# Patient Record
Sex: FEMALE | Race: WHITE | HISPANIC OR LATINO | Employment: UNEMPLOYED | URBAN - METROPOLITAN AREA
[De-identification: names, ages, dates, MRNs, and addresses within clinical notes are randomized per-mention and may not be internally consistent; named-entity substitution may affect disease eponyms.]

---

## 2017-05-13 ENCOUNTER — HOSPITAL ENCOUNTER (EMERGENCY)
Facility: HOSPITAL | Age: 52
Discharge: HOME | End: 2017-05-14
Attending: EMERGENCY MEDICINE | Admitting: EMERGENCY MEDICINE
Payer: COMMERCIAL

## 2017-05-13 ENCOUNTER — APPOINTMENT (EMERGENCY)
Dept: ULTRASOUND IMAGING | Facility: HOSPITAL | Age: 52
End: 2017-05-13
Payer: COMMERCIAL

## 2017-05-13 ENCOUNTER — APPOINTMENT (EMERGENCY)
Dept: CT IMAGING | Facility: HOSPITAL | Age: 52
End: 2017-05-13
Payer: COMMERCIAL

## 2017-05-13 VITALS
HEART RATE: 85 BPM | WEIGHT: 226.85 LBS | DIASTOLIC BLOOD PRESSURE: 109 MMHG | RESPIRATION RATE: 18 BRPM | SYSTOLIC BLOOD PRESSURE: 195 MMHG | TEMPERATURE: 97.7 F | OXYGEN SATURATION: 96 %

## 2017-05-13 DIAGNOSIS — R20.2 PARESTHESIA OF LEFT LEG: ICD-10-CM

## 2017-05-13 DIAGNOSIS — M79.605 LEFT LEG PAIN: Primary | ICD-10-CM

## 2017-05-13 DIAGNOSIS — R91.1 NODULE OF RIGHT LUNG: ICD-10-CM

## 2017-05-13 LAB
ANION GAP SERPL CALCULATED.3IONS-SCNC: 8 MMOL/L (ref 4–13)
APTT PPP: 28 SECONDS (ref 23–35)
BASOPHILS # BLD AUTO: 0.06 THOUSANDS/ΜL (ref 0–0.1)
BASOPHILS NFR BLD AUTO: 1 % (ref 0–1)
BUN SERPL-MCNC: 8 MG/DL (ref 5–25)
CALCIUM SERPL-MCNC: 8.6 MG/DL (ref 8.3–10.1)
CHLORIDE SERPL-SCNC: 106 MMOL/L (ref 100–108)
CK SERPL-CCNC: 107 U/L (ref 26–192)
CO2 SERPL-SCNC: 30 MMOL/L (ref 21–32)
CREAT SERPL-MCNC: 0.69 MG/DL (ref 0.6–1.3)
EOSINOPHIL # BLD AUTO: 0.21 THOUSAND/ΜL (ref 0–0.61)
EOSINOPHIL NFR BLD AUTO: 2 % (ref 0–6)
ERYTHROCYTE [DISTWIDTH] IN BLOOD BY AUTOMATED COUNT: 14.2 % (ref 11.6–15.1)
GFR SERPL CREATININE-BSD FRML MDRD: >60 ML/MIN/1.73SQ M
GLUCOSE SERPL-MCNC: 170 MG/DL (ref 65–140)
HCT VFR BLD AUTO: 39.3 % (ref 34.8–46.1)
HGB BLD-MCNC: 12.7 G/DL (ref 11.5–15.4)
INR PPP: 1 (ref 0.86–1.16)
LACTATE SERPL-SCNC: 1.1 MMOL/L (ref 0.5–2)
LYMPHOCYTES # BLD AUTO: 3.81 THOUSANDS/ΜL (ref 0.6–4.47)
LYMPHOCYTES NFR BLD AUTO: 31 % (ref 14–44)
MCH RBC QN AUTO: 27.9 PG (ref 26.8–34.3)
MCHC RBC AUTO-ENTMCNC: 32.3 G/DL (ref 31.4–37.4)
MCV RBC AUTO: 86 FL (ref 82–98)
MONOCYTES # BLD AUTO: 0.98 THOUSAND/ΜL (ref 0.17–1.22)
MONOCYTES NFR BLD AUTO: 8 % (ref 4–12)
NEUTROPHILS # BLD AUTO: 7.37 THOUSANDS/ΜL (ref 1.85–7.62)
NEUTS SEG NFR BLD AUTO: 58 % (ref 43–75)
PLATELET # BLD AUTO: 284 THOUSANDS/UL (ref 149–390)
PMV BLD AUTO: 10.3 FL (ref 8.9–12.7)
POTASSIUM SERPL-SCNC: 3.5 MMOL/L (ref 3.5–5.3)
PROTHROMBIN TIME: 13.5 SECONDS (ref 12.1–14.4)
RBC # BLD AUTO: 4.55 MILLION/UL (ref 3.81–5.12)
SODIUM SERPL-SCNC: 144 MMOL/L (ref 136–145)
WBC # BLD AUTO: 12.43 THOUSAND/UL (ref 4.31–10.16)

## 2017-05-13 PROCEDURE — 85730 THROMBOPLASTIN TIME PARTIAL: CPT | Performed by: EMERGENCY MEDICINE

## 2017-05-13 PROCEDURE — 85025 COMPLETE CBC W/AUTO DIFF WBC: CPT | Performed by: EMERGENCY MEDICINE

## 2017-05-13 PROCEDURE — 85610 PROTHROMBIN TIME: CPT | Performed by: EMERGENCY MEDICINE

## 2017-05-13 PROCEDURE — 75635 CT ANGIO ABDOMINAL ARTERIES: CPT

## 2017-05-13 PROCEDURE — 96361 HYDRATE IV INFUSION ADD-ON: CPT

## 2017-05-13 PROCEDURE — 96374 THER/PROPH/DIAG INJ IV PUSH: CPT

## 2017-05-13 PROCEDURE — 80048 BASIC METABOLIC PNL TOTAL CA: CPT | Performed by: EMERGENCY MEDICINE

## 2017-05-13 PROCEDURE — 36415 COLL VENOUS BLD VENIPUNCTURE: CPT | Performed by: EMERGENCY MEDICINE

## 2017-05-13 PROCEDURE — 83605 ASSAY OF LACTIC ACID: CPT | Performed by: EMERGENCY MEDICINE

## 2017-05-13 PROCEDURE — 96372 THER/PROPH/DIAG INJ SC/IM: CPT

## 2017-05-13 PROCEDURE — 93971 EXTREMITY STUDY: CPT

## 2017-05-13 PROCEDURE — 82550 ASSAY OF CK (CPK): CPT | Performed by: EMERGENCY MEDICINE

## 2017-05-13 RX ORDER — INSULIN GLARGINE 100 [IU]/ML
60 INJECTION, SOLUTION SUBCUTANEOUS
COMMUNITY
End: 2020-06-02 | Stop reason: ALTCHOICE

## 2017-05-13 RX ORDER — LISINOPRIL 10 MG/1
10 TABLET ORAL ONCE
Status: COMPLETED | OUTPATIENT
Start: 2017-05-13 | End: 2017-05-13

## 2017-05-13 RX ORDER — AMLODIPINE BESYLATE 5 MG/1
10 TABLET ORAL ONCE
Status: COMPLETED | OUTPATIENT
Start: 2017-05-13 | End: 2017-05-13

## 2017-05-13 RX ORDER — OXYCODONE HYDROCHLORIDE AND ACETAMINOPHEN 5; 325 MG/1; MG/1
1 TABLET ORAL ONCE
Status: COMPLETED | OUTPATIENT
Start: 2017-05-13 | End: 2017-05-13

## 2017-05-13 RX ORDER — LISINOPRIL 40 MG/1
40 TABLET ORAL DAILY
COMMUNITY
End: 2020-06-29

## 2017-05-13 RX ORDER — ONDANSETRON 2 MG/ML
4 INJECTION INTRAMUSCULAR; INTRAVENOUS ONCE
Status: COMPLETED | OUTPATIENT
Start: 2017-05-14 | End: 2017-05-14

## 2017-05-13 RX ORDER — AMLODIPINE BESYLATE 10 MG/1
10 TABLET ORAL DAILY
COMMUNITY
End: 2021-11-23 | Stop reason: HOSPADM

## 2017-05-13 RX ADMIN — OXYCODONE HYDROCHLORIDE AND ACETAMINOPHEN 1 TABLET: 5; 325 TABLET ORAL at 20:11

## 2017-05-13 RX ADMIN — HYDROMORPHONE HYDROCHLORIDE 1 MG: 1 INJECTION, SOLUTION INTRAMUSCULAR; INTRAVENOUS; SUBCUTANEOUS at 20:33

## 2017-05-13 RX ADMIN — HYDROMORPHONE HYDROCHLORIDE 1 MG: 1 INJECTION, SOLUTION INTRAMUSCULAR; INTRAVENOUS; SUBCUTANEOUS at 21:56

## 2017-05-13 RX ADMIN — LISINOPRIL 10 MG: 10 TABLET ORAL at 20:11

## 2017-05-13 RX ADMIN — GABAPENTIN 400 MG: 100 CAPSULE ORAL at 21:56

## 2017-05-13 RX ADMIN — AMLODIPINE BESYLATE 10 MG: 5 TABLET ORAL at 20:12

## 2017-05-13 RX ADMIN — IOHEXOL 120 ML: 350 INJECTION, SOLUTION INTRAVENOUS at 22:53

## 2017-05-13 RX ADMIN — SODIUM CHLORIDE 1000 ML: 0.9 INJECTION, SOLUTION INTRAVENOUS at 21:45

## 2017-05-14 PROCEDURE — 96375 TX/PRO/DX INJ NEW DRUG ADDON: CPT

## 2017-05-14 PROCEDURE — 99284 EMERGENCY DEPT VISIT MOD MDM: CPT

## 2017-05-14 RX ADMIN — ONDANSETRON 4 MG: 2 INJECTION INTRAMUSCULAR; INTRAVENOUS at 00:06

## 2017-07-26 ENCOUNTER — ALLSCRIPTS OFFICE VISIT (OUTPATIENT)
Dept: OTHER | Facility: OTHER | Age: 52
End: 2017-07-26

## 2017-07-26 DIAGNOSIS — E28.39 OTHER PRIMARY OVARIAN FAILURE: ICD-10-CM

## 2017-07-26 DIAGNOSIS — Z12.31 ENCOUNTER FOR SCREENING MAMMOGRAM FOR MALIGNANT NEOPLASM OF BREAST: ICD-10-CM

## 2017-07-28 LAB
HPV 18 (HISTORICAL): NOT DETECTED
HPV HIGH RISK 16/18 (HISTORICAL): NOT DETECTED
HPV16 (HISTORICAL): NOT DETECTED
PAP (HISTORICAL): NORMAL

## 2017-11-15 ENCOUNTER — ALLSCRIPTS OFFICE VISIT (OUTPATIENT)
Dept: OTHER | Facility: OTHER | Age: 52
End: 2017-11-15

## 2017-11-15 LAB
BACTERIA UR QL AUTO: YES
CLUE CELL (HISTORICAL): NO
TRICHOMONAS (HISTORICAL): NO
YEAST (HISTORICAL): YES

## 2017-11-17 NOTE — PROGRESS NOTES
Assessment    1  Acute vaginitis (616 10) (N76 0)    Plan  Acute vaginitis    · Terconazole 0 4 % Vaginal Cream; INSERT 1 APPLICATORFUL INTRAVAGINALLYAT BEDTIME NIGHTLY   Rx By: Mina Cleaning; Dispense: 0 Days ; #:3 GM; Refill: 0;For: Acute vaginitis; ELLA = N; Verified Transmission to [facility]; Last Updated By: [employer]; 11/15/2017 12:22:20 PM   · (B) CULTURE, GENITAL; Status:Active; Requested for:27Zhb7076;    Perform:BioReference; Due:65Rst8883; Ordered; For:Acute vaginitis; Ordered By:Da Barajas;   · 97 Rumit[address] Thornton; Status:Complete;   Done: 38XQL6503 03:31PM   Performed: In Office; KVE:86NQU3438;ELPCWMB; Today;vaginitis; Ordered By:Da Barajas; Discussion/Summary  Discussion Summary:   R/w pt wet mount c/w candida  Rx sent to pharmacy  Advised probiotic tx additionally as needed based on cx results  Goals and Barriers: The patient has the current Goals: Examine GYN problem  The patent has the current Barriers: None  Patient's Capacity to Self-Care: Patient is able to Self-Care  Medication SE Review and Pt Understands Tx: The treatment plan was reviewed with the patient/guardian  The patient/guardian understands and agrees with the treatment plan   Self Referrals:   Self Referrals: No      Chief Complaint  Chief Complaint Free Text Note Form: Pt for problem visit      History of Present Illness  HPI: Pt for problem visit  Had strep throat few weeks ago began to notice pinkish d/c approx 1 5 weeks ago  Unsure if there is any odor  Was on abx for strep throat  Today began w vaginal itching and irritation  urinary sx  Does complain of vaginal edema  Has not been sexually active in months  Review of Systems  Focused-Female:  Constitutional: No fever, no chills, feels well, no tiredness, no recent weight gain or loss  Cardiovascular: no complaints of slow or fast heart rate, no chest pain, no palpitations, no leg claudication or lower extremity edema    Respiratory: shortness of breath,-- cough,-- wheezing-- and-- shortness of breath during exertion  Breasts: no complaints of breast pain, breast lump or nipple discharge  Gastrointestinal: no complaints of abdominal pain, no constipation, no nausea or diarrhea, no vomiting, no bloody stools  Genitourinary: no complaints of dysuria, no incontinence, no pelvic pain, no dysmenorrhea, no vaginal discharge or abnormal vaginal bleeding  Musculoskeletal: Back Pain, but-- no complaints of arthralgia, no myalgia, no joint swelling or stiffness, no limb pain or swelling  Neurological: no complaints of headache, no confusion, no numbness or tingling, no dizziness or fainting  Other Symptoms: Depression, Anxiety, Easy bruising  Active Problems  1  Anxiety (300 00) (F41 9)   2  Carpal tunnel syndrome (354 0) (G56 00)   3  Depression (311) (F32 9)   4  Diabetes (250 00) (E11 9)   5  Encounter for annual routine gynecological examination (V72 31) (Z01 419)   6  Encounter for screening mammogram for breast cancer (V76 12) (Z12 31)   7  Fibromyalgia (729 1) (M79 7)   8  GERD (gastroesophageal reflux disease) (530 81) (K21 9)   9  History of degenerative disc disease (V13 59) (Z87 39)   10  Hypertension (401 9) (I10)   11  Hypoestrogenism (256 39) (E28 39)   12  Neuropathy (355 9) (G62 9)    Past Medical History  1  History of  7    Surgical History  1  History of Cholecystectomy   2  History of Hysterectomy   3  History of Ovarian Cystectomy   4  History of Tubal Ligation    Family History  Mother    1  Family history of hypertension (V17 49) (Z82 49)  Father    2  Family history of diabetes mellitus (V18 0) (Z83 3)  Sister    3  Family history of renal cell carcinoma (V16 51) (Z80 51)  Aunt    4   Family history of diabetes mellitus (V18 0) (Z83 3)    Social History     · Current every day smoker (305 1) (F17 200)   ·    · Monogamous relationship with monogamous partner   · No alcohol use   · No illicit drug use   · Primary spoken language English    Current Meds   1  AmLODIPine Besylate 10 MG Oral Tablet; Therapy: 32VDS4154 to Recorded   2  Apidra SoloStar 100 UNIT/ML Subcutaneous Solution Pen-injector; Therapy: 34XEA6279 to Recorded   3  Cyclobenzaprine HCl - 5 MG Oral Tablet; Therapy: 51Sif1708 to Recorded   4  Diclofenac Sodium 75 MG Oral Tablet Delayed Release; Therapy: 86YTF2297 to Recorded   5  Doxazosin Mesylate 4 MG Oral Tablet; Therapy: 69NOD8325 to Recorded   6  Gabapentin 600 MG Oral Tablet; Therapy: 50UMV5092 to Recorded   7  HydroCHLOROthiazide 25 MG Oral Tablet; Therapy: 91ODM0759 to Recorded   8  Lantus SoloStar 100 UNIT/ML Subcutaneous Solution Pen-injector; Therapy: 01LUB0345 to Recorded   9  Lisinopril 40 MG Oral Tablet; Therapy: 71DPT6320 to Recorded   10  MetFORMIN HCl - 1000 MG Oral Tablet; Therapy: (Recorded:37Ulo5084) to Recorded   11  Metoprolol Tartrate 100 MG Oral Tablet; Therapy: 33RDS6804 to Recorded   12  OxyCODONE HCl - 5 MG Oral Tablet; Therapy: 39WQC1447 to Recorded   13  Simvastatin 10 MG Oral Tablet; Therapy: 63MGB6380 to Recorded   14  UltiCare Micro Pen Needles 32G X 4 MM Miscellaneous; Therapy: 87WXN8240 to Recorded    Allergies  1  No Known Drug Allergies    Vitals  Vital Signs    Recorded: 90VMC7282 66:93ZK   Systolic 978, LUE, Sitting   Diastolic 90, LUE, Sitting   BP CUFF SIZE Standard   Height 5 ft 10 in   Weight 207 lb    BMI Calculated 29 7   BSA Calculated 2 12       Physical Exam   Constitutional  General appearance: No acute distress, well appearing and well nourished  Genitourinary  External genitalia: Abnormal  -- moderate amount of external erythema  Vagina: Abnormal  -- moderate amount of thick yellow d/c present in vaginal vault  Urethra: Normal    Urethral meatus: Normal    Bladder: Normal, soft, non-tender and no prolapse or masses appreciated  Cervix: Surgically absent  Uterus: Surgically absent  Adnexa/parametria: Normal, non-tender and no fullness or masses appreciated  Skin  Palpation of skin and subcutaneous tissue: Normal    Psychiatric  Orientation to person, place, and time: Normal    Mood and affect: Normal        Results/Data  Naheed Caldwell- POC 77DZW3628 03:31PM Dolores Mcarthur     Test Name Result Flag Reference   BACTERIA yes     CLUE CELL no     TRICHOMONAS no     YEAST yes           Attending Note  Collaborating Physician Note: Collaborating Physician: I discussed the case with the Advanced Practitioner and reviewed the note-- and-- I agree with the Advanced Practitioner note        Signatures   Electronically signed by : Kiarra Stout [facility]; Nov 15 2017 12:36PM EST                       (Author)    Electronically signed by : Kiarra Stout [facility]; Nov 15 2017  3:32PM EST                       (Author)    Electronically signed by : Lorenza Trivedi MD; Nov 16 2017 10:58AM EST                       (Author)

## 2017-11-19 LAB — CULT RSLT GENITAL (HISTORICAL): ABNORMAL

## 2018-01-12 VITALS
HEIGHT: 70 IN | DIASTOLIC BLOOD PRESSURE: 90 MMHG | BODY MASS INDEX: 29.63 KG/M2 | WEIGHT: 207 LBS | SYSTOLIC BLOOD PRESSURE: 158 MMHG

## 2018-01-14 VITALS
BODY MASS INDEX: 29.92 KG/M2 | WEIGHT: 209 LBS | DIASTOLIC BLOOD PRESSURE: 102 MMHG | HEIGHT: 70 IN | SYSTOLIC BLOOD PRESSURE: 184 MMHG

## 2018-04-10 ENCOUNTER — TELEPHONE (OUTPATIENT)
Dept: OBGYN CLINIC | Facility: CLINIC | Age: 53
End: 2018-04-10

## 2018-04-12 ENCOUNTER — OFFICE VISIT (OUTPATIENT)
Dept: OBGYN CLINIC | Facility: CLINIC | Age: 53
End: 2018-04-12
Payer: COMMERCIAL

## 2018-04-12 VITALS
HEIGHT: 71 IN | WEIGHT: 212 LBS | SYSTOLIC BLOOD PRESSURE: 190 MMHG | BODY MASS INDEX: 29.68 KG/M2 | DIASTOLIC BLOOD PRESSURE: 110 MMHG

## 2018-04-12 DIAGNOSIS — B37.3 YEAST VAGINITIS: ICD-10-CM

## 2018-04-12 DIAGNOSIS — N89.8 VAGINAL DISCHARGE: Primary | ICD-10-CM

## 2018-04-12 DIAGNOSIS — N76.0 RECURRENT VAGINITIS: ICD-10-CM

## 2018-04-12 PROBLEM — I10 HYPERTENSION: Status: ACTIVE | Noted: 2017-07-26

## 2018-04-12 PROBLEM — R01.1 CARDIAC MURMUR: Status: ACTIVE | Noted: 2017-12-15

## 2018-04-12 PROBLEM — E28.39 HYPOESTROGENISM: Status: ACTIVE | Noted: 2017-07-26

## 2018-04-12 PROBLEM — M79.7 FIBROMYALGIA: Status: ACTIVE | Noted: 2017-07-26

## 2018-04-12 PROBLEM — F51.01 PRIMARY INSOMNIA: Status: ACTIVE | Noted: 2017-12-15

## 2018-04-12 PROBLEM — F41.9 ANXIETY: Status: ACTIVE | Noted: 2017-07-26

## 2018-04-12 PROBLEM — G62.9 NEUROPATHY: Status: ACTIVE | Noted: 2017-07-26

## 2018-04-12 PROBLEM — E11.9 DIABETES (HCC): Status: ACTIVE | Noted: 2017-07-26

## 2018-04-12 PROBLEM — F32.A DEPRESSION: Status: ACTIVE | Noted: 2017-07-26

## 2018-04-12 PROBLEM — B37.31 YEAST VAGINITIS: Status: ACTIVE | Noted: 2018-04-12

## 2018-04-12 PROBLEM — R19.4 CHANGE IN BOWEL HABIT: Status: ACTIVE | Noted: 2017-04-24

## 2018-04-12 PROBLEM — Z78.9 MEDICALLY COMPLEX PATIENT: Status: ACTIVE | Noted: 2017-06-05

## 2018-04-12 PROBLEM — G89.4 CHRONIC PAIN DISORDER: Status: ACTIVE | Noted: 2017-06-05

## 2018-04-12 PROCEDURE — 99213 OFFICE O/P EST LOW 20 MIN: CPT | Performed by: NURSE PRACTITIONER

## 2018-04-12 RX ORDER — SIMVASTATIN 10 MG
1 TABLET ORAL DAILY
COMMUNITY
Start: 2017-04-05 | End: 2020-06-29

## 2018-04-12 RX ORDER — SERTRALINE HYDROCHLORIDE 25 MG/1
2 TABLET, FILM COATED ORAL
COMMUNITY
Start: 2017-09-22 | End: 2018-08-11

## 2018-04-12 RX ORDER — OSELTAMIVIR PHOSPHATE 75 MG/1
75 CAPSULE ORAL 2 TIMES DAILY
Refills: 0 | COMMUNITY
Start: 2018-01-07 | End: 2018-08-11

## 2018-04-12 RX ORDER — ONDANSETRON 4 MG/1
1 TABLET, FILM COATED ORAL 3 TIMES DAILY PRN
Refills: 0 | COMMUNITY
Start: 2018-01-07 | End: 2019-01-13

## 2018-04-12 RX ORDER — ALPRAZOLAM 0.5 MG/1
0.25 TABLET ORAL 2 TIMES DAILY PRN
COMMUNITY
Start: 2017-09-22 | End: 2020-04-17 | Stop reason: SDUPTHER

## 2018-04-12 RX ORDER — OXYCODONE HYDROCHLORIDE 5 MG/1
1 CAPSULE ORAL EVERY 4 HOURS PRN
Status: ON HOLD | COMMUNITY
Start: 2017-03-14 | End: 2018-08-12

## 2018-04-12 RX ORDER — MELOXICAM 15 MG/1
1 TABLET ORAL DAILY PRN
Refills: 2 | COMMUNITY
Start: 2018-02-22 | End: 2018-08-11

## 2018-04-12 RX ORDER — METOPROLOL TARTRATE 100 MG/1
1 TABLET ORAL DAILY
Status: ON HOLD | COMMUNITY
Start: 2017-04-05 | End: 2018-08-12

## 2018-04-12 RX ORDER — CYCLOBENZAPRINE HCL 5 MG
1 TABLET ORAL 3 TIMES DAILY PRN
COMMUNITY
Start: 2017-04-24 | End: 2018-08-12 | Stop reason: HOSPADM

## 2018-04-12 RX ORDER — GABAPENTIN 600 MG/1
600 TABLET ORAL 3 TIMES DAILY PRN
COMMUNITY
Start: 2017-09-22 | End: 2018-08-11

## 2018-04-12 RX ORDER — FLUCONAZOLE 150 MG/1
TABLET ORAL
Qty: 27 TABLET | Refills: 0 | Status: SHIPPED | OUTPATIENT
Start: 2018-04-12 | End: 2018-08-11

## 2018-04-12 RX ORDER — LANCETS 30 GAUGE
1 EACH MISCELLANEOUS 4 TIMES DAILY
Status: ON HOLD | COMMUNITY

## 2018-04-12 RX ORDER — FLUCONAZOLE 150 MG/1
TABLET ORAL
Qty: 2 TABLET | Refills: 0 | Status: SHIPPED | OUTPATIENT
Start: 2018-04-12 | End: 2018-04-12 | Stop reason: SDUPTHER

## 2018-04-12 RX ORDER — DICLOFENAC SODIUM 75 MG/1
1 TABLET, DELAYED RELEASE ORAL DAILY
COMMUNITY
Start: 2017-06-14 | End: 2018-08-10 | Stop reason: ALTCHOICE

## 2018-04-12 RX ORDER — GABAPENTIN 600 MG/1
1 TABLET ORAL DAILY
COMMUNITY
Start: 2017-06-06 | End: 2018-08-11

## 2018-04-12 NOTE — ASSESSMENT & PLAN NOTE
Reviewed with patient symptoms as well as clinical presentation consistant with yeast vaginitis  Will treat with Diflucan  Reviewed will given Diflucan for recurrent yeast infection  Take 1 pill daily for 3 days then 1 pill weekly for 6 months  Stressed importance on keeping blood sugars under control as will continue to have recurrent yeast infections as long as blood sugars are not controlled  Advised Acidophilous daily  Advised to quit smoking  Advised patient to be seen in ER due to elevated blood pressure in office and continued to be elevated on recheck after resting for 15 minutes in room  Pt states due to her back pain and had not taken her BP meds this morning  Reviewed BP should not be that high from missing one dose of BP meds  Reviewed at high risk for stroke, MI, would really really encourage patient be evaluated in ER

## 2018-04-12 NOTE — PROGRESS NOTES
Assessment/Plan:    Yeast vaginitis  Reviewed with patient symptoms as well as clinical presentation consistant with yeast vaginitis  Will treat with Diflucan  Reviewed will given Diflucan for recurrent yeast infection  Take 1 pill daily for 3 days then 1 pill weekly for 6 months  Stressed importance on keeping blood sugars under control as will continue to have recurrent yeast infections as long as blood sugars are not controlled  Advised Acidophilous daily  Advised to quit smoking  Advised patient to be seen in ER due to elevated blood pressure in office and continued to be elevated on recheck after resting for 15 minutes in room  Pt states due to her back pain and had not taken her BP meds this morning  Reviewed BP should not be that high from missing one dose of BP meds  Reviewed at high risk for stroke, MI, would really really encourage patient be evaluated in ER  Diagnoses and all orders for this visit:    Vaginal discharge  -     Discontinue: fluconazole (DIFLUCAN) 150 mg tablet; Take 1 tablet now skip a day then take 1 tablet on day 3   -     Discontinue: fluconazole (DIFLUCAN) 150 mg tablet; Take 1 tablet now skip a day then take 1 tablet on day 3   -     fluconazole (DIFLUCAN) 150 mg tablet; Take 1 tablet daily for 3 days, then take 1 tablet weekly for 6 months  Yeast vaginitis  -     Discontinue: fluconazole (DIFLUCAN) 150 mg tablet; Take 1 tablet now skip a day then take 1 tablet on day 3   -     Discontinue: fluconazole (DIFLUCAN) 150 mg tablet; Take 1 tablet now skip a day then take 1 tablet on day 3   -     fluconazole (DIFLUCAN) 150 mg tablet; Take 1 tablet daily for 3 days, then take 1 tablet weekly for 6 months  Recurrent vaginitis  -     fluconazole (DIFLUCAN) 150 mg tablet; Take 1 tablet daily for 3 days, then take 1 tablet weekly for 6 months  Other orders  -     cyclobenzaprine (FLEXERIL) 5 mg tablet;  Take 1 tablet by mouth daily  -     diclofenac (VOLTAREN) 75 mg EC tablet; Take 1 tablet by mouth daily  -     gabapentin (NEURONTIN) 600 MG tablet; Take 1 tablet by mouth daily  -     metoprolol tartrate (LOPRESSOR) 100 mg tablet; Take 1 tablet by mouth daily  -     oxyCODONE (OXY-IR) 5 MG capsule; Take 1 tablet by mouth every 4 (four) hours as needed  -     simvastatin (ZOCOR) 10 mg tablet; Take 1 tablet by mouth daily  -     Insulin Pen Needle (ULTICARE MICRO PEN NEEDLES) 32G X 4 MM MISC; 1 Device by Does not apply route 4 (four) times a day  -     ALPRAZolam (XANAX) 0 5 mg tablet; Take 0 5 mg by mouth 2 (two) times a day  -     Glucose Blood (TRUE FOCUS BLOOD GLUCOSE STRIP VI); 1 Device by Does not apply route 4 (four) times a day  -     gabapentin (NEURONTIN) 600 MG tablet; Take 600 mg by mouth 3 (three) times a day as needed  -     Lancets MISC; 1 Device by Does not apply route 4 (four) times a day  -     sertraline (ZOLOFT) 25 mg tablet; Take 2 tablets by mouth daily at bedtime  -     Insulin Pen Needle (UNIFINE PENTIPS PLUS) 31G X 6 MM MISC; 1 Device by Does not apply route 4 (four) times a day  -     meloxicam (MOBIC) 15 mg tablet; Take 1 tablet by mouth daily as needed for pain  -     ondansetron (ZOFRAN) 4 mg tablet; Take 1 tablet by mouth 3 (three) times a day as needed for nausea  -     oseltamivir (TAMIFLU) 75 mg capsule; Take 75 mg by mouth 2 (two) times a day          Subjective:      Patient ID: Nadir Rose is a 47 y o  female  Pt presents today for complaints of recurrent yeast infection  Pt states she was seen in our office sometime in November (11/15/17) and was diagnosed with a yeast infection  Pt was given Terconazole as OTC monistat had not relieved symptoms  After taking Terconazole had relief for 1-2 days then symptoms returned  Pt then received Diflucan 2 pills which she took and symptoms never resolved  Pt states she has continued to have itching, burning, clumpy white vaginal discharge which does not seem to go away   Pt states over the last few months has treated herself with OTC Monistat several times with temporary relief  Pt denies any new medications, soaps, detergents  Reviewed loose cotton clothing, plain dove soap, no scented products used in vaginal area, try to sleep without underwear if possible  Pt is an uncontrolled diabetic who states she has good days and bad days when it comes to her sugars  Pt proceed to state she is also under a lot of stress with her chronic medical conditions  Reports is having difficulty with a lot of back pain, trying to control her blood sugars and blood pressures, as well as acute bronchitis  She said she is trying her best but with all of her chronic conditions becomes difficult to manage them all  Denies any bowel or bladder problems  Last annual exam 7/26/17 Pap normal HPV negative  Period Duration (Days): Historectomy 2001                            The following portions of the patient's history were reviewed and updated as appropriate: allergies, current medications, past family history, past medical history, past social history, past surgical history and problem list   No Known Allergies  Current Outpatient Prescriptions on File Prior to Visit   Medication Sig Dispense Refill    amLODIPine (NORVASC) 10 mg tablet Take 10 mg by mouth daily      Doxazosin Mesylate (CARDURA PO) Take by mouth      HYDROCHLOROTHIAZIDE PO Take by mouth      insulin glargine (LANTUS) 100 units/mL subcutaneous injection Inject 60 Units under the skin daily at bedtime      Insulin Glulisine (APIDRA SOLOSTAR SC) Inject 20 Units under the skin 3 (three) times a day      lisinopril (ZESTRIL) 40 mg tablet Take 40 mg by mouth daily      metFORMIN (GLUCOPHAGE) 1000 MG tablet Take 1,000 mg by mouth 2 (two) times a day with meals      METOPROLOL SUCCINATE ER PO Take by mouth       No current facility-administered medications on file prior to visit        Family History   Problem Relation Age of Onset    Hypertension Mother    [facility] Diabetes Father     Other Sister      renal cell carcinoma    Diabetes Other      Past Medical History:   Diagnosis Date    Diabetes mellitus ([facility] [address] [facility]     Hyperlipidemia     Hypertension      Past Surgical History:   Procedure Laterality Date    CHOLECYSTECTOMY      HYSTERECTOMY      OVARIAN CYST REMOVAL      TUBAL LIGATION       Patient Active Problem List   Diagnosis    Anxiety    Cardiac murmur    Carpal tunnel syndrome of left wrist    Change in bowel habit    Chronic pain disorder    Cough    Depression    Diabetes [facility] [address] [facility]    [facility]    Dizziness    Essential hypertension    Fibromyalgia    Gastroesophageal reflux disease with esophagitis    Hemangioma of bone    Hyperlipidemia associated with type 2 diabetes mellitus (HCC)    Hypertension    Hypoestrogenism    Large adrenal gland (HCC)    Low back pain    Lumbar radiculopathy, chronic    Medically complex patient    Neuropathy    Noncompliance    Noncompliance with diet and medication regimen    Overweight    Pancreatitis    Primary insomnia    Right-sided thoracic back pain    Sciatica of left side    Screening for colon cancer    Shingles    Thoracic radiculitis    Tobacco abuse    Uncontrolled type 2 diabetes mellitus with complication, with long-term current use of insulin (HCC)    Vertebral body hemangioma    Vertigo    Vaginal discharge    Yeast vaginitis    Recurrent vaginitis         Review of Systems   Respiratory: Positive for cough  Genitourinary: Positive for vaginal discharge  Musculoskeletal: Positive for arthralgias and back pain  Psychiatric/Behavioral:        Depression, anxiety     All other systems reviewed and are negative  Objective:      BP (!) 190/110   Ht 5' 10 5" (1 791 m)   Wt 96 2 kg (212 lb)   BMI 29 99 kg/m²      Physical Exam   Constitutional: She is oriented to person, place, and time  She appears well-developed and well-nourished     HENT: Head: Normocephalic  Neck: Normal range of motion  Neck supple  No tracheal deviation present  No thyromegaly present  Cardiovascular: Normal rate, regular rhythm and normal heart sounds  Pulmonary/Chest: Effort normal  She has wheezes  Abdominal: Soft  Bowel sounds are normal  She exhibits no distension and no mass  There is no tenderness  There is no rebound and no guarding  Genitourinary: No labial fusion  There is no rash, tenderness, lesion or injury on the right labia  There is no rash, tenderness, lesion or injury on the left labia  Vaginal discharge (thick white dischrge, consistant with yeast) found  Genitourinary Comments: Cervix and uterus surgically absent   Musculoskeletal: Normal range of motion  Neurological: She is alert and oriented to person, place, and time  Skin: Skin is warm and dry  Psychiatric: She has a normal mood and affect   Her behavior is normal  Judgment and thought content normal

## 2018-08-10 ENCOUNTER — HOSPITAL ENCOUNTER (EMERGENCY)
Facility: HOSPITAL | Age: 53
Discharge: HOME | End: 2018-08-10
Attending: EMERGENCY MEDICINE | Admitting: EMERGENCY MEDICINE
Payer: COMMERCIAL

## 2018-08-10 ENCOUNTER — APPOINTMENT (EMERGENCY)
Dept: CT IMAGING | Facility: HOSPITAL | Age: 53
End: 2018-08-10
Payer: COMMERCIAL

## 2018-08-10 VITALS
SYSTOLIC BLOOD PRESSURE: 155 MMHG | DIASTOLIC BLOOD PRESSURE: 78 MMHG | RESPIRATION RATE: 17 BRPM | TEMPERATURE: 98.3 F | OXYGEN SATURATION: 99 % | HEART RATE: 78 BPM

## 2018-08-10 DIAGNOSIS — R73.9 HYPERGLYCEMIA: ICD-10-CM

## 2018-08-10 DIAGNOSIS — I10 HTN (HYPERTENSION): ICD-10-CM

## 2018-08-10 DIAGNOSIS — K76.0 HEPATIC STEATOSIS: ICD-10-CM

## 2018-08-10 DIAGNOSIS — E87.6 HYPOKALEMIA: ICD-10-CM

## 2018-08-10 DIAGNOSIS — R59.0 HILAR LYMPHADENOPATHY: ICD-10-CM

## 2018-08-10 DIAGNOSIS — I71.2 THORACIC AORTIC ANEURYSM WITHOUT RUPTURE (HCC): Primary | ICD-10-CM

## 2018-08-10 LAB
ANION GAP SERPL CALCULATED.3IONS-SCNC: 8 MMOL/L (ref 4–13)
BACTERIA UR QL AUTO: ABNORMAL /HPF
BASOPHILS # BLD AUTO: 0.05 THOUSANDS/ΜL (ref 0–0.1)
BASOPHILS NFR BLD AUTO: 1 % (ref 0–1)
BILIRUB UR QL STRIP: NEGATIVE
BUN SERPL-MCNC: 10 MG/DL (ref 5–25)
CALCIUM SERPL-MCNC: 9.2 MG/DL (ref 8.3–10.1)
CHLORIDE SERPL-SCNC: 101 MMOL/L (ref 100–108)
CLARITY UR: ABNORMAL
CO2 SERPL-SCNC: 32 MMOL/L (ref 21–32)
COLOR UR: YELLOW
CREAT SERPL-MCNC: 0.62 MG/DL (ref 0.6–1.3)
EOSINOPHIL # BLD AUTO: 0.16 THOUSAND/ΜL (ref 0–0.61)
EOSINOPHIL NFR BLD AUTO: 2 % (ref 0–6)
ERYTHROCYTE [DISTWIDTH] IN BLOOD BY AUTOMATED COUNT: 14 % (ref 11.6–15.1)
GFR SERPL CREATININE-BSD FRML MDRD: 120 ML/MIN/1.73SQ M
GLUCOSE SERPL-MCNC: 290 MG/DL (ref 65–140)
GLUCOSE UR STRIP-MCNC: ABNORMAL MG/DL
HCT VFR BLD AUTO: 40.9 % (ref 34.8–46.1)
HGB BLD-MCNC: 13.6 G/DL (ref 11.5–15.4)
HGB UR QL STRIP.AUTO: ABNORMAL
IMM GRANULOCYTES # BLD AUTO: 0.02 THOUSAND/UL (ref 0–0.2)
IMM GRANULOCYTES NFR BLD AUTO: 0 % (ref 0–2)
KETONES UR STRIP-MCNC: NEGATIVE MG/DL
LEUKOCYTE ESTERASE UR QL STRIP: ABNORMAL
LYMPHOCYTES # BLD AUTO: 3.2 THOUSANDS/ΜL (ref 0.6–4.47)
LYMPHOCYTES NFR BLD AUTO: 36 % (ref 14–44)
MCH RBC QN AUTO: 27.8 PG (ref 26.8–34.3)
MCHC RBC AUTO-ENTMCNC: 33.3 G/DL (ref 31.4–37.4)
MCV RBC AUTO: 84 FL (ref 82–98)
MONOCYTES # BLD AUTO: 0.7 THOUSAND/ΜL (ref 0.17–1.22)
MONOCYTES NFR BLD AUTO: 8 % (ref 4–12)
NEUTROPHILS # BLD AUTO: 4.79 THOUSANDS/ΜL (ref 1.85–7.62)
NEUTS SEG NFR BLD AUTO: 53 % (ref 43–75)
NITRITE UR QL STRIP: NEGATIVE
NON-SQ EPI CELLS URNS QL MICRO: ABNORMAL /HPF
NRBC BLD AUTO-RTO: 0 /100 WBCS
PH UR STRIP.AUTO: 6.5 [PH] (ref 4.5–8)
PLATELET # BLD AUTO: 307 THOUSANDS/UL (ref 149–390)
PMV BLD AUTO: 11.5 FL (ref 8.9–12.7)
POTASSIUM SERPL-SCNC: 3.1 MMOL/L (ref 3.5–5.3)
PROT UR STRIP-MCNC: ABNORMAL MG/DL
RBC # BLD AUTO: 4.9 MILLION/UL (ref 3.81–5.12)
RBC #/AREA URNS AUTO: ABNORMAL /HPF
SODIUM SERPL-SCNC: 141 MMOL/L (ref 136–145)
SP GR UR STRIP.AUTO: 1.01 (ref 1–1.03)
TROPONIN I SERPL-MCNC: <0.02 NG/ML
UROBILINOGEN UR QL STRIP.AUTO: 0.2 E.U./DL
WBC # BLD AUTO: 8.92 THOUSAND/UL (ref 4.31–10.16)
WBC #/AREA URNS AUTO: ABNORMAL /HPF

## 2018-08-10 PROCEDURE — 93005 ELECTROCARDIOGRAM TRACING: CPT

## 2018-08-10 PROCEDURE — 71275 CT ANGIOGRAPHY CHEST: CPT

## 2018-08-10 PROCEDURE — 87086 URINE CULTURE/COLONY COUNT: CPT

## 2018-08-10 PROCEDURE — 87147 CULTURE TYPE IMMUNOLOGIC: CPT

## 2018-08-10 PROCEDURE — 99284 EMERGENCY DEPT VISIT MOD MDM: CPT

## 2018-08-10 PROCEDURE — 96374 THER/PROPH/DIAG INJ IV PUSH: CPT

## 2018-08-10 PROCEDURE — 80048 BASIC METABOLIC PNL TOTAL CA: CPT | Performed by: EMERGENCY MEDICINE

## 2018-08-10 PROCEDURE — 36415 COLL VENOUS BLD VENIPUNCTURE: CPT | Performed by: EMERGENCY MEDICINE

## 2018-08-10 PROCEDURE — 96375 TX/PRO/DX INJ NEW DRUG ADDON: CPT

## 2018-08-10 PROCEDURE — 85025 COMPLETE CBC W/AUTO DIFF WBC: CPT | Performed by: EMERGENCY MEDICINE

## 2018-08-10 PROCEDURE — 81001 URINALYSIS AUTO W/SCOPE: CPT

## 2018-08-10 PROCEDURE — 74175 CTA ABDOMEN W/CONTRAST: CPT

## 2018-08-10 PROCEDURE — 84484 ASSAY OF TROPONIN QUANT: CPT | Performed by: EMERGENCY MEDICINE

## 2018-08-10 RX ORDER — POTASSIUM CHLORIDE 20 MEQ/1
40 TABLET, EXTENDED RELEASE ORAL ONCE
Status: COMPLETED | OUTPATIENT
Start: 2018-08-10 | End: 2018-08-10

## 2018-08-10 RX ORDER — MORPHINE SULFATE 4 MG/ML
4 INJECTION, SOLUTION INTRAMUSCULAR; INTRAVENOUS ONCE
Status: COMPLETED | OUTPATIENT
Start: 2018-08-10 | End: 2018-08-10

## 2018-08-10 RX ORDER — LABETALOL HYDROCHLORIDE 5 MG/ML
10 INJECTION, SOLUTION INTRAVENOUS ONCE
Status: DISCONTINUED | OUTPATIENT
Start: 2018-08-10 | End: 2018-08-11 | Stop reason: HOSPADM

## 2018-08-10 RX ORDER — KETOROLAC TROMETHAMINE 30 MG/ML
15 INJECTION, SOLUTION INTRAMUSCULAR; INTRAVENOUS ONCE
Status: COMPLETED | OUTPATIENT
Start: 2018-08-10 | End: 2018-08-10

## 2018-08-10 RX ADMIN — POTASSIUM CHLORIDE 40 MEQ: 1500 TABLET, EXTENDED RELEASE ORAL at 22:29

## 2018-08-10 RX ADMIN — KETOROLAC TROMETHAMINE 15 MG: 30 INJECTION, SOLUTION INTRAMUSCULAR at 19:13

## 2018-08-10 RX ADMIN — IOHEXOL 100 ML: 350 INJECTION, SOLUTION INTRAVENOUS at 21:19

## 2018-08-10 RX ADMIN — MORPHINE SULFATE 4 MG: 4 INJECTION INTRAVENOUS at 19:13

## 2018-08-10 NOTE — ED PROVIDER NOTES
History  Chief Complaint   Patient presents with    Hypertension     pt reports recent d/c of gabapentin reports "diabetic nerve pain" in feet and hands  Pt reports hx high bp  checked in each arm bp is systolically above 986  pt reports taking bp meds today with slight headache this am      Hand Pain    Foot Pain       History provided by:  Patient   used: No    Hypertension   Associated symptoms: no abdominal pain, no chest pain, no dizziness, no fever, no headaches, no nausea, no neck pain, no palpitations, no shortness of breath, not vomiting and no weakness    Hand Pain   Associated symptoms: no abdominal pain, no chest pain, no congestion, no cough, no diarrhea, no fever, no headaches, no nausea, no rash, no shortness of breath, no sore throat, no vomiting and no wheezing      Patient is a 81-year-old female presenting to the emergency department with back pain, chronic  Getting worse  Arm and leg pains  Also chronic  Taking gabapentin 3 weeks ago  No chest pain or shortness of breath  Patient is hypertensive  States her blood pressures difficult to control  No abdominal pain  No nausea or vomiting  No fevers  MDM cardiac workup, treat pain, re-evaluate blood pressure        Prior to Admission Medications   Prescriptions Last Dose Informant Patient Reported? Taking?    ALPRAZolam (XANAX) 0 5 mg tablet   Yes Yes   Sig: Take 0 25 mg by mouth 2 (two) times a day     Dapagliflozin-Metformin HCl (XIGDUO XR PO)   Yes Yes   Sig: Take 5 mg by mouth daily   Glucose Blood (TRUE FOCUS BLOOD GLUCOSE STRIP VI)   Yes Yes   Si Device by Does not apply route 4 (four) times a day   HYDROCHLOROTHIAZIDE PO   Yes Yes   Sig: Take by mouth   Insulin Glulisine (APIDRA SOLOSTAR SC)   Yes Yes   Sig: Inject 20 Units under the skin 3 (three) times a day   Insulin Pen Needle (ULTICARE MICRO PEN NEEDLES) 32G X 4 MM MISC   Yes Yes   Si Device by Does not apply route 4 (four) times a day   Insulin Pen Needle (UNIFINE PENTIPS PLUS) 31G X 6 MM MISC   Yes Yes   Si Device by Does not apply route 4 (four) times a day   Lancets MISC   Yes Yes   Si Device by Does not apply route 4 (four) times a day   amLODIPine (NORVASC) 10 mg tablet   Yes Yes   Sig: Take 10 mg by mouth daily   cyclobenzaprine (FLEXERIL) 5 mg tablet   Yes Yes   Sig: Take 1 tablet by mouth daily   fluconazole (DIFLUCAN) 150 mg tablet   No Yes   Sig: Take 1 tablet daily for 3 days, then take 1 tablet weekly for 6 months     gabapentin (NEURONTIN) 600 MG tablet   Yes Yes   Sig: Take 1 tablet by mouth daily   gabapentin (NEURONTIN) 600 MG tablet   Yes Yes   Sig: Take 600 mg by mouth 3 (three) times a day as needed   insulin glargine (LANTUS) 100 units/mL subcutaneous injection   Yes Yes   Sig: Inject 60 Units under the skin daily at bedtime   lisinopril (ZESTRIL) 40 mg tablet   Yes Yes   Sig: Take 40 mg by mouth daily   meloxicam (MOBIC) 15 mg tablet   Yes Yes   Sig: Take 1 tablet by mouth daily as needed for pain   metoprolol tartrate (LOPRESSOR) 100 mg tablet   Yes Yes   Sig: Take 1 tablet by mouth daily   ondansetron (ZOFRAN) 4 mg tablet   Yes Yes   Sig: Take 1 tablet by mouth 3 (three) times a day as needed for nausea   oseltamivir (TAMIFLU) 75 mg capsule   Yes Yes   Sig: Take 75 mg by mouth 2 (two) times a day   oxyCODONE (OXY-IR) 5 MG capsule   Yes Yes   Sig: Take 1 tablet by mouth every 4 (four) hours as needed   sertraline (ZOLOFT) 25 mg tablet   Yes Yes   Sig: Take 2 tablets by mouth daily at bedtime   simvastatin (ZOCOR) 10 mg tablet   Yes Yes   Sig: Take 1 tablet by mouth daily      Facility-Administered Medications: None       Past Medical History:   Diagnosis Date    Diabetes mellitus (HCC)     Fibromyalgia     GERD (gastroesophageal reflux disease)     Hyperlipidemia     Hypertension     Psychiatric disorder        Past Surgical History:   Procedure Laterality Date    CHOLECYSTECTOMY      HYSTERECTOMY      OVARIAN CYST REMOVAL      TUBAL LIGATION         Family History   Problem Relation Age of Onset    Hypertension Mother     Diabetes Father     Other Sister         renal cell carcinoma    Diabetes Other      I have reviewed and agree with the history as documented  Social History   Substance Use Topics    Smoking status: Current Every Day Smoker     Packs/day: 1 00    Smokeless tobacco: Never Used    Alcohol use No        Review of Systems   Constitutional: Negative for chills, diaphoresis and fever  HENT: Negative for congestion and sore throat  Respiratory: Negative for cough, shortness of breath, wheezing and stridor  Cardiovascular: Negative for chest pain, palpitations and leg swelling  Gastrointestinal: Negative for abdominal pain, blood in stool, diarrhea, nausea and vomiting  Genitourinary: Negative for dysuria, frequency and urgency  Musculoskeletal: Positive for back pain  Negative for neck pain and neck stiffness  Skin: Negative for pallor and rash  Neurological: Negative for dizziness, syncope, weakness, light-headedness and headaches  All other systems reviewed and are negative  Physical Exam  Physical Exam   Constitutional: She is oriented to person, place, and time  She appears well-developed and well-nourished  HENT:   Head: Normocephalic and atraumatic  Eyes: Pupils are equal, round, and reactive to light  Neck: Neck supple  Cardiovascular: Normal rate, regular rhythm, normal heart sounds and intact distal pulses  Pulmonary/Chest: Effort normal and breath sounds normal  No respiratory distress  Abdominal: Soft  Bowel sounds are normal  There is no tenderness  Musculoskeletal: Normal range of motion  She exhibits no edema or tenderness  Neurological: She is alert and oriented to person, place, and time  Skin: Skin is warm and dry  Capillary refill takes less than 2 seconds  No rash noted  No erythema  Vitals reviewed        Vital Signs  ED Triage Vitals [08/10/18 1821]   Temperature Pulse Respirations Blood Pressure SpO2   98 3 °F (36 8 °C) 96 18 (!) 227/112 99 %      Temp src Heart Rate Source Patient Position - Orthostatic VS BP Location FiO2 (%)   -- Monitor Lying Left arm --      Pain Score       Worst Possible Pain           Vitals:    08/10/18 1821 08/10/18 1915 08/10/18 2045 08/10/18 2200   BP: (!) 227/112 (!) 193/107 154/90 155/78   Pulse: 96 82  78   Patient Position - Orthostatic VS: Lying Sitting Standing Standing       Visual Acuity      ED Medications  Medications   labetalol (NORMODYNE) injection 10 mg (0 mg Intravenous Hold 8/10/18 2049)   insulin regular (HumuLIN R,NovoLIN R) injection 10 Units (10 Units Subcutaneous Not Given 8/10/18 2132)   morphine (PF) 4 mg/mL injection 4 mg (4 mg Intravenous Given 8/10/18 1913)   ketorolac (TORADOL) injection 15 mg (15 mg Intravenous Given 8/10/18 1913)   iohexol (OMNIPAQUE) 350 MG/ML injection (MULTI-DOSE) 100 mL (100 mL Intravenous Given 8/10/18 2119)   potassium chloride (K-DUR,KLOR-CON) CR tablet 40 mEq (40 mEq Oral Given 8/10/18 2229)       Diagnostic Studies  Results Reviewed     Procedure Component Value Units Date/Time    Urine Microscopic [[id number]]  (Abnormal) Collected:  08/10/18 2105    Lab Status:  Final result Specimen:  Urine from Urine, Clean Catch Updated:  08/10/18 2201     RBC, UA 10-20 (A) /hpf      WBC, UA 30-50 (A) /hpf      Epithelial Cells Moderate (A) /hpf      Bacteria, UA Innumerable (A) /hpf     Urine culture [[id number]] Collected:  08/10/18 2105    Lab Status:   In process Specimen:  Urine from Urine, Clean Catch Updated:  08/10/18 2200    ED Urine Macroscopic [[id number]]  (Abnormal) Collected:  08/10/18 2105    Lab Status:  Final result Specimen:  Urine Updated:  08/10/18 2056     Color, UA Yellow     Clarity, UA Cloudy     pH, UA 6 5     Leukocytes, UA Large (A)     Nitrite, UA Negative     Protein, UA Trace (A) mg/dl      Glucose,  (1/2%) (A) mg/dl      Ketones, UA Negative mg/dl      Urobilinogen, UA 0 2 E U /dl      Bilirubin, UA Negative     Blood, UA Small (A)     Specific [place], UA 1 015    Narrative:       CLINITEK RESULT    Troponin I [[id number]]  (Normal) Collected:  08/10/18 1912    Lab Status:  Final result Specimen:  Blood from Arm, Right Updated:  08/10/18 2046     Troponin I <0 02 ng/mL     Basic metabolic panel [[id number]]  (Abnormal) Collected:  08/10/18 1912    Lab Status:  Final result Specimen:  Blood from Arm, Right Updated:  08/10/18 2038     Sodium 141 mmol/L      Potassium 3 1 (L) mmol/L      Chloride 101 mmol/L      CO2 32 mmol/L      Anion Gap 8 mmol/L      BUN 10 mg/dL      Creatinine 0 62 mg/dL      Glucose 290 (H) mg/dL      Calcium 9 2 mg/dL      eGFR 120 ml/min/1 73sq m     Narrative:         National Kidney Disease Education Program recommendations are as follows:  GFR calculation is accurate only with a steady state creatinine  Chronic Kidney disease less than 60 ml/min/1 73 sq  meters  Kidney failure less than 15 ml/min/1 73 sq  meters  CBC and differential [[id number]] Collected:  08/10/18 1912    Lab Status:  Final result Specimen:  Blood from Arm, Right Updated:  08/10/18 2027     WBC 8 92 Thousand/uL      RBC 4 90 Million/uL      Hemoglobin 13 6 g/dL      Hematocrit 40 9 %      MCV 84 fL      MCH 27 8 pg      MCHC 33 3 g/dL      RDW 14 0 %      MPV 11 5 fL      Platelets 748 Thousands/uL      nRBC 0 /100 WBCs      Neutrophils Relative 53 %      Immat GRANS % 0 %      Lymphocytes Relative 36 %      Monocytes Relative 8 %      Eosinophils Relative 2 %      Basophils Relative 1 %      Neutrophils Absolute 4 79 Thousands/µL      Immature Grans Absolute 0 02 Thousand/uL      Lymphocytes Absolute 3 20 Thousands/µL      Monocytes Absolute 0 70 Thousand/µL      Eosinophils Absolute 0 16 Thousand/µL      Basophils Absolute 0 05 Thousands/µL                  CTA dissection protocol chest and abdomen   Final Result by Roge Longo MD (08/10 2146)         1  Mildly aneurysmal ascending thoracic aorta measuring 4 3 x 4 1 cm  No evidence of intramural hematoma  No evidence of dissection  Severe atherosclerotic plaque in a focal segment of the splenic artery  However, no evidence of splenic infarct  2   Scattered lung bullae and emphysematous changes  3   Enlarged nonspecific right hilar lymph node  4   Hepatic steatosis and hepatomegaly  Workstation performed: [id number]                    Procedures  Procedures       Phone Contacts  ED Phone Contact    ED Course  ED Course as of Aug 10 2314   Fri Aug 10, 2018   2223 ECG shows rate of 83, sinus, left axis deviation, white QRS, nonspecific ST and T-wave changes inferiorly, flipped T-waves inferiorly  No old EKG  2223 Had a long discussion with the patient  I expressed my concern for patient's abnormal EKG  Patient does not want to stay in hospital   Patient is aware she can have a heart attack and arrhythmia and die  Patient states she could be permanently disabled from this  Patient will follow up with family doctor  All this was explained with patient and her friend in the room                                  MDM  CritCare Time    Disposition  Final diagnoses:   Thoracic aortic aneurysm without rupture ([facility] [address] )   Hypokalemia   Hepatic steatosis   HTN (hypertension)   Hilar lymphadenopathy   Hyperglycemia     Time reflects when diagnosis was documented in both MDM as applicable and the Disposition within this note     Time User Action Codes Description Comment    8/10/2018 10:25 PM Tadevosyan, Miroslava Add [I71 2] Thoracic aortic aneurysm without rupture ([facility] [address] )     8/10/2018 10:25 PM Tadevosyan, Miroslava Add [E87 6] Hypokalemia     8/10/2018 10:25 PM Tadevosyan, Miroslava Add [K76 0] Hepatic steatosis     8/10/2018 10:25 PM Tadevosyan, Miroslava Add [I10] HTN (hypertension)     8/10/2018 10:25 PM Tadevosyan, Miroslava Add [R59 0] Hilar lymphadenopathy     8/10/2018 10:27 PM Tadevosyan, Miroslava Add [R73 9] Hyperglycemia ED Disposition     ED Disposition Condition Comment    Discharge  Afua Red discharge to home/self care      [address]        Follow-up Information     Follow up With Specialties Details Why Contact Info Additional Debbie 13, DO Internal Medicine In 3 days Re-evaluation [id number] [facility] [id number]  [facility] Emergency Department Emergency Medicine  As needed, If symptoms worsen, if you change your mind [address]  [phone] AN ED, [address], [facility], [address] [facility], [address]    Quang Voss MD Thoracic Surgery, Thoracic Diseases, Cardiothoracic Surgery Schedule an appointment as soon as possible for a visit in 1 week Thoracic aorta aneurysm [address] [facility]  [phone]             Discharge Medication List as of 8/10/2018 10:27 PM      CONTINUE these medications which have NOT CHANGED    Details   ALPRAZolam (XANAX) 0 5 mg tablet Take 0 25 mg by mouth 2 (two) times a day  , Starting Fri 9/22/2017, Historical Med      amLODIPine (NORVASC) 10 mg tablet Take 10 mg by mouth daily, Until Discontinued, Historical Med      cyclobenzaprine (FLEXERIL) 5 mg tablet Take 1 tablet by mouth daily, Starting Mon 4/24/2017, Historical Med      Dapagliflozin-Metformin HCl (XIGDUO XR PO) Take 5 mg by mouth daily, Historical Med      fluconazole (DIFLUCAN) 150 mg tablet Take 1 tablet daily for 3 days, then take 1 tablet weekly for 6 months , Normal      !! gabapentin (NEURONTIN) 600 MG tablet Take 1 tablet by mouth daily, Starting Tue 6/6/2017, Historical Med      !! gabapentin (NEURONTIN) 600 MG tablet Take 600 mg by mouth 3 (three) times a day as needed, Starting Fri 9/22/2017, Until Sat 9/22/2018, Historical Med      Glucose Blood (TRUE FOCUS BLOOD GLUCOSE STRIP VI) 1 Device by Does not apply route 4 (four) times a day, Starting Mon 5/9/2016, Historical Med      HYDROCHLOROTHIAZIDE PO Take by mouth, Until Discontinued, Historical Med      insulin glargine (LANTUS) 100 units/mL subcutaneous injection Inject 60 Units under the skin daily at bedtime, Until Discontinued, Historical Med      Insulin Glulisine (APIDRA SOLOSTAR SC) Inject 20 Units under the skin 3 (three) times a day, Until Discontinued, Historical Med      !! Insulin Pen Needle (ULTICARE MICRO PEN NEEDLES) 32G X 4 MM MISC 1 Device by Does not apply route 4 (four) times a day, Starting Tue 6/6/2017, Historical Med      !! Insulin Pen Needle (UNIFINE PENTIPS PLUS) 31G X 6 MM MISC 1 Device by Does not apply route 4 (four) times a day, Starting Mon 6/5/2017, Historical Med      Lancets MISC 1 Device by Does not apply route 4 (four) times a day, Historical Med      lisinopril (ZESTRIL) 40 mg tablet Take 40 mg by mouth daily, Until Discontinued, Historical Med      meloxicam (MOBIC) 15 mg tablet Take 1 tablet by mouth daily as needed for pain, Starting Thu 2/22/2018, Historical Med      metoprolol tartrate (LOPRESSOR) 100 mg tablet Take 1 tablet by mouth daily, Starting Wed 4/5/2017, Historical Med      ondansetron (ZOFRAN) 4 mg tablet Take 1 tablet by mouth 3 (three) times a day as needed for nausea, Starting Sun 1/7/2018, Historical Med      oseltamivir (TAMIFLU) 75 mg capsule Take 75 mg by mouth 2 (two) times a day, Starting Sun 1/7/2018, Historical Med      oxyCODONE (OXY-IR) 5 MG capsule Take 1 tablet by mouth every 4 (four) hours as needed, Starting Tue 3/14/2017, Historical Med      sertraline (ZOLOFT) 25 mg tablet Take 2 tablets by mouth daily at bedtime, Starting Fri 9/22/2017, Historical Med      simvastatin (ZOCOR) 10 mg tablet Take 1 tablet by mouth daily, Starting Wed 4/5/2017, Historical Med       !! - Potential duplicate medications found  Please discuss with provider  No discharge procedures on file      ED Provider  Electronically Signed by           Terry Carrion MD  08/10/18 2406

## 2018-08-11 ENCOUNTER — HOSPITAL ENCOUNTER (OUTPATIENT)
Facility: HOSPITAL | Age: 53
Setting detail: OBSERVATION
Discharge: HOME | End: 2018-08-12
Attending: EMERGENCY MEDICINE | Admitting: INTERNAL MEDICINE
Payer: COMMERCIAL

## 2018-08-11 DIAGNOSIS — R10.13 EPIGASTRIC PAIN: Primary | ICD-10-CM

## 2018-08-11 DIAGNOSIS — G89.4 CHRONIC PAIN DISORDER: ICD-10-CM

## 2018-08-11 DIAGNOSIS — R82.90 ABNORMAL URINALYSIS: ICD-10-CM

## 2018-08-11 DIAGNOSIS — I10 UNCONTROLLED HYPERTENSION: ICD-10-CM

## 2018-08-11 DIAGNOSIS — R94.31 EKG ABNORMALITY: ICD-10-CM

## 2018-08-11 DIAGNOSIS — K21.00 GASTROESOPHAGEAL REFLUX DISEASE WITH ESOPHAGITIS: ICD-10-CM

## 2018-08-11 PROBLEM — E87.6 HYPOKALEMIA: Status: ACTIVE | Noted: 2018-08-11

## 2018-08-11 LAB
ALBUMIN SERPL BCP-MCNC: 3.3 G/DL (ref 3.5–5)
ALP SERPL-CCNC: 105 U/L (ref 46–116)
ALT SERPL W P-5'-P-CCNC: 33 U/L (ref 12–78)
ANION GAP SERPL CALCULATED.3IONS-SCNC: 8 MMOL/L (ref 4–13)
AST SERPL W P-5'-P-CCNC: 18 U/L (ref 5–45)
BACTERIA UR QL AUTO: ABNORMAL /HPF
BASOPHILS # BLD AUTO: 0.04 THOUSANDS/ΜL (ref 0–0.1)
BASOPHILS NFR BLD AUTO: 0 % (ref 0–1)
BILIRUB SERPL-MCNC: 0.4 MG/DL (ref 0.2–1)
BILIRUB UR QL STRIP: NEGATIVE
BUN SERPL-MCNC: 8 MG/DL (ref 5–25)
CALCIUM SERPL-MCNC: 9 MG/DL (ref 8.3–10.1)
CHLORIDE SERPL-SCNC: 102 MMOL/L (ref 100–108)
CLARITY UR: ABNORMAL
CO2 SERPL-SCNC: 31 MMOL/L (ref 21–32)
COLOR UR: YELLOW
CREAT SERPL-MCNC: 0.7 MG/DL (ref 0.6–1.3)
EOSINOPHIL # BLD AUTO: 0.16 THOUSAND/ΜL (ref 0–0.61)
EOSINOPHIL NFR BLD AUTO: 1 % (ref 0–6)
ERYTHROCYTE [DISTWIDTH] IN BLOOD BY AUTOMATED COUNT: 13.7 % (ref 11.6–15.1)
GFR SERPL CREATININE-BSD FRML MDRD: 115 ML/MIN/1.73SQ M
GLUCOSE SERPL-MCNC: 204 MG/DL (ref 65–140)
GLUCOSE SERPL-MCNC: 215 MG/DL (ref 65–140)
GLUCOSE SERPL-MCNC: 246 MG/DL (ref 65–140)
GLUCOSE UR STRIP-MCNC: NEGATIVE MG/DL
HCG SERPL QL: NEGATIVE
HCT VFR BLD AUTO: 41 % (ref 34.8–46.1)
HGB BLD-MCNC: 13.7 G/DL (ref 11.5–15.4)
HGB UR QL STRIP.AUTO: ABNORMAL
IMM GRANULOCYTES # BLD AUTO: 0.04 THOUSAND/UL (ref 0–0.2)
IMM GRANULOCYTES NFR BLD AUTO: 0 % (ref 0–2)
KETONES UR STRIP-MCNC: NEGATIVE MG/DL
LEUKOCYTE ESTERASE UR QL STRIP: ABNORMAL
LIPASE SERPL-CCNC: 166 U/L (ref 73–393)
LYMPHOCYTES # BLD AUTO: 2.71 THOUSANDS/ΜL (ref 0.6–4.47)
LYMPHOCYTES NFR BLD AUTO: 23 % (ref 14–44)
MCH RBC QN AUTO: 28 PG (ref 26.8–34.3)
MCHC RBC AUTO-ENTMCNC: 33.4 G/DL (ref 31.4–37.4)
MCV RBC AUTO: 84 FL (ref 82–98)
MONOCYTES # BLD AUTO: 0.7 THOUSAND/ΜL (ref 0.17–1.22)
MONOCYTES NFR BLD AUTO: 6 % (ref 4–12)
NEUTROPHILS # BLD AUTO: 8.02 THOUSANDS/ΜL (ref 1.85–7.62)
NEUTS SEG NFR BLD AUTO: 70 % (ref 43–75)
NITRITE UR QL STRIP: NEGATIVE
NON-SQ EPI CELLS URNS QL MICRO: ABNORMAL /HPF
NRBC BLD AUTO-RTO: 0 /100 WBCS
PH UR STRIP.AUTO: 6 [PH] (ref 4.5–8)
PLATELET # BLD AUTO: 296 THOUSANDS/UL (ref 149–390)
PMV BLD AUTO: 10.6 FL (ref 8.9–12.7)
POTASSIUM SERPL-SCNC: 3.2 MMOL/L (ref 3.5–5.3)
PROT SERPL-MCNC: 7.2 G/DL (ref 6.4–8.2)
PROT UR STRIP-MCNC: NEGATIVE MG/DL
RBC # BLD AUTO: 4.89 MILLION/UL (ref 3.81–5.12)
RBC #/AREA URNS AUTO: ABNORMAL /HPF
SODIUM 24H UR-SCNC: 143 MOL/L
SODIUM SERPL-SCNC: 141 MMOL/L (ref 136–145)
SP GR UR STRIP.AUTO: 1.02 (ref 1–1.03)
TROPONIN I SERPL-MCNC: <0.02 NG/ML
TROPONIN I SERPL-MCNC: <0.02 NG/ML
UROBILINOGEN UR QL STRIP.AUTO: 0.2 E.U./DL
WBC # BLD AUTO: 11.67 THOUSAND/UL (ref 4.31–10.16)
WBC #/AREA URNS AUTO: ABNORMAL /HPF

## 2018-08-11 PROCEDURE — 84300 ASSAY OF URINE SODIUM: CPT | Performed by: PHYSICIAN ASSISTANT

## 2018-08-11 PROCEDURE — 84703 CHORIONIC GONADOTROPIN ASSAY: CPT | Performed by: PHYSICIAN ASSISTANT

## 2018-08-11 PROCEDURE — 81001 URINALYSIS AUTO W/SCOPE: CPT | Performed by: PHYSICIAN ASSISTANT

## 2018-08-11 PROCEDURE — 84484 ASSAY OF TROPONIN QUANT: CPT | Performed by: PHYSICIAN ASSISTANT

## 2018-08-11 PROCEDURE — 96374 THER/PROPH/DIAG INJ IV PUSH: CPT

## 2018-08-11 PROCEDURE — 36415 COLL VENOUS BLD VENIPUNCTURE: CPT | Performed by: EMERGENCY MEDICINE

## 2018-08-11 PROCEDURE — 99284 EMERGENCY DEPT VISIT MOD MDM: CPT

## 2018-08-11 PROCEDURE — 80053 COMPREHEN METABOLIC PANEL: CPT | Performed by: EMERGENCY MEDICINE

## 2018-08-11 PROCEDURE — 84484 ASSAY OF TROPONIN QUANT: CPT | Performed by: EMERGENCY MEDICINE

## 2018-08-11 PROCEDURE — 85025 COMPLETE CBC W/AUTO DIFF WBC: CPT | Performed by: EMERGENCY MEDICINE

## 2018-08-11 PROCEDURE — 82088 ASSAY OF ALDOSTERONE: CPT | Performed by: PHYSICIAN ASSISTANT

## 2018-08-11 PROCEDURE — 82948 REAGENT STRIP/BLOOD GLUCOSE: CPT

## 2018-08-11 PROCEDURE — 99220 PR INITIAL OBSERVATION CARE/DAY 70 MINUTES: CPT | Performed by: INTERNAL MEDICINE

## 2018-08-11 PROCEDURE — 93005 ELECTROCARDIOGRAM TRACING: CPT

## 2018-08-11 PROCEDURE — 83690 ASSAY OF LIPASE: CPT | Performed by: EMERGENCY MEDICINE

## 2018-08-11 RX ORDER — METOPROLOL TARTRATE 100 MG/1
100 TABLET ORAL EVERY 12 HOURS SCHEDULED
Status: DISCONTINUED | OUTPATIENT
Start: 2018-08-11 | End: 2018-08-12 | Stop reason: HOSPADM

## 2018-08-11 RX ORDER — ESCITALOPRAM OXALATE 10 MG/1
5 TABLET ORAL DAILY
Status: DISCONTINUED | OUTPATIENT
Start: 2018-08-12 | End: 2018-08-12 | Stop reason: HOSPADM

## 2018-08-11 RX ORDER — POTASSIUM CHLORIDE 20 MEQ/1
20 TABLET, EXTENDED RELEASE ORAL ONCE
Status: COMPLETED | OUTPATIENT
Start: 2018-08-11 | End: 2018-08-11

## 2018-08-11 RX ORDER — POTASSIUM CHLORIDE 20 MEQ/1
40 TABLET, EXTENDED RELEASE ORAL ONCE
Status: COMPLETED | OUTPATIENT
Start: 2018-08-11 | End: 2018-08-11

## 2018-08-11 RX ORDER — ESCITALOPRAM OXALATE 10 MG/1
5 TABLET ORAL DAILY
COMMUNITY
End: 2020-02-03

## 2018-08-11 RX ORDER — PRAVASTATIN SODIUM 20 MG
20 TABLET ORAL
Status: DISCONTINUED | OUTPATIENT
Start: 2018-08-11 | End: 2018-08-12 | Stop reason: HOSPADM

## 2018-08-11 RX ORDER — CALCIUM CARBONATE 200(500)MG
1000 TABLET,CHEWABLE ORAL DAILY PRN
Status: DISCONTINUED | OUTPATIENT
Start: 2018-08-11 | End: 2018-08-12 | Stop reason: HOSPADM

## 2018-08-11 RX ORDER — PANTOPRAZOLE SODIUM 20 MG/1
20 TABLET, DELAYED RELEASE ORAL
Status: DISCONTINUED | OUTPATIENT
Start: 2018-08-12 | End: 2018-08-12 | Stop reason: HOSPADM

## 2018-08-11 RX ORDER — SENNOSIDES 8.6 MG
1 TABLET ORAL DAILY
Status: DISCONTINUED | OUTPATIENT
Start: 2018-08-12 | End: 2018-08-12 | Stop reason: HOSPADM

## 2018-08-11 RX ORDER — MORPHINE SULFATE 2 MG/ML
2 INJECTION, SOLUTION INTRAMUSCULAR; INTRAVENOUS EVERY 4 HOURS PRN
Status: DISCONTINUED | OUTPATIENT
Start: 2018-08-11 | End: 2018-08-12 | Stop reason: HOSPADM

## 2018-08-11 RX ORDER — NICOTINE 21 MG/24HR
1 PATCH, TRANSDERMAL 24 HOURS TRANSDERMAL DAILY
Status: DISCONTINUED | OUTPATIENT
Start: 2018-08-11 | End: 2018-08-12 | Stop reason: HOSPADM

## 2018-08-11 RX ORDER — ONDANSETRON 2 MG/ML
4 INJECTION INTRAMUSCULAR; INTRAVENOUS EVERY 6 HOURS PRN
Status: DISCONTINUED | OUTPATIENT
Start: 2018-08-11 | End: 2018-08-12 | Stop reason: HOSPADM

## 2018-08-11 RX ORDER — KETOROLAC TROMETHAMINE 30 MG/ML
15 INJECTION, SOLUTION INTRAMUSCULAR; INTRAVENOUS EVERY 6 HOURS SCHEDULED
Status: DISCONTINUED | OUTPATIENT
Start: 2018-08-11 | End: 2018-08-12 | Stop reason: HOSPADM

## 2018-08-11 RX ORDER — OXYCODONE HYDROCHLORIDE 10 MG/1
10 TABLET ORAL EVERY 4 HOURS PRN
Status: DISCONTINUED | OUTPATIENT
Start: 2018-08-11 | End: 2018-08-12 | Stop reason: HOSPADM

## 2018-08-11 RX ORDER — METHOCARBAMOL 500 MG/1
500 TABLET, FILM COATED ORAL EVERY 6 HOURS PRN
Status: DISCONTINUED | OUTPATIENT
Start: 2018-08-11 | End: 2018-08-12 | Stop reason: HOSPADM

## 2018-08-11 RX ORDER — MORPHINE SULFATE 4 MG/ML
4 INJECTION, SOLUTION INTRAMUSCULAR; INTRAVENOUS ONCE
Status: COMPLETED | OUTPATIENT
Start: 2018-08-11 | End: 2018-08-11

## 2018-08-11 RX ORDER — LISINOPRIL 20 MG/1
40 TABLET ORAL DAILY
Status: DISCONTINUED | OUTPATIENT
Start: 2018-08-12 | End: 2018-08-12 | Stop reason: HOSPADM

## 2018-08-11 RX ORDER — ACETAMINOPHEN 325 MG/1
650 TABLET ORAL EVERY 6 HOURS PRN
Status: DISCONTINUED | OUTPATIENT
Start: 2018-08-11 | End: 2018-08-12 | Stop reason: HOSPADM

## 2018-08-11 RX ORDER — ASPIRIN 81 MG/1
324 TABLET, CHEWABLE ORAL ONCE
Status: COMPLETED | OUTPATIENT
Start: 2018-08-11 | End: 2018-08-11

## 2018-08-11 RX ORDER — HYDROCHLOROTHIAZIDE 25 MG/1
25 TABLET ORAL DAILY
Status: DISCONTINUED | OUTPATIENT
Start: 2018-08-12 | End: 2018-08-12 | Stop reason: HOSPADM

## 2018-08-11 RX ORDER — INSULIN GLARGINE 100 [IU]/ML
60 INJECTION, SOLUTION SUBCUTANEOUS
Status: DISCONTINUED | OUTPATIENT
Start: 2018-08-11 | End: 2018-08-12 | Stop reason: HOSPADM

## 2018-08-11 RX ORDER — AMLODIPINE BESYLATE 10 MG/1
10 TABLET ORAL DAILY
Status: DISCONTINUED | OUTPATIENT
Start: 2018-08-12 | End: 2018-08-12 | Stop reason: HOSPADM

## 2018-08-11 RX ADMIN — POTASSIUM CHLORIDE 40 MEQ: 1500 TABLET, EXTENDED RELEASE ORAL at 19:58

## 2018-08-11 RX ADMIN — POTASSIUM CHLORIDE 20 MEQ: 1500 TABLET, EXTENDED RELEASE ORAL at 18:25

## 2018-08-11 RX ADMIN — OXYCODONE HYDROCHLORIDE 10 MG: 10 TABLET ORAL at 21:19

## 2018-08-11 RX ADMIN — INSULIN LISPRO 20 UNITS: 100 INJECTION, SOLUTION INTRAVENOUS; SUBCUTANEOUS at 20:02

## 2018-08-11 RX ADMIN — INSULIN LISPRO 3 UNITS: 100 INJECTION, SOLUTION INTRAVENOUS; SUBCUTANEOUS at 22:40

## 2018-08-11 RX ADMIN — ASPIRIN 81 MG 324 MG: 81 TABLET ORAL at 16:38

## 2018-08-11 RX ADMIN — NICOTINE 1 PATCH: 21 PATCH, EXTENDED RELEASE TRANSDERMAL at 23:44

## 2018-08-11 RX ADMIN — KETOROLAC TROMETHAMINE 15 MG: 30 INJECTION, SOLUTION INTRAMUSCULAR at 19:58

## 2018-08-11 RX ADMIN — MORPHINE SULFATE 4 MG: 4 INJECTION INTRAVENOUS at 16:40

## 2018-08-11 RX ADMIN — INSULIN GLARGINE 60 UNITS: 100 INJECTION, SOLUTION SUBCUTANEOUS at 22:40

## 2018-08-11 RX ADMIN — METOPROLOL TARTRATE 100 MG: 100 TABLET ORAL at 20:00

## 2018-08-11 NOTE — ASSESSMENT & PLAN NOTE
· /100  Patient reports that she "often" has BPs that are 488 systolic, sometimes more  · It appears that previously patient was not taking her medications as directed (review of outpatient note in April); however, patient reports compliance with her medications  Her current BP meds include: amlodipine 10mg PO QD, HCTZ 25mg PO QD, metoprolol tartate 100mg PO QD, lisinopril 40mg PO QD   In setting of newly diagnosed AAA, would opt for better blood pressure control   · Will continue amlodipine at current dose, HCTZ at current dose, increase metoprolol to 100mg PO BID, continue lisinopril 40mg PO QD  · Will also pursue secondary HTN work up:   · Renin  · Aldosterone  · Serum metanephrines   · Renal artery US

## 2018-08-11 NOTE — ED NOTES
SLIM reviewing patient's admission  Patient to remain in ED until TARYN approves admission  Primary nurse and technician aware       Mariluz Acevedo RN  08/11/18 6602

## 2018-08-11 NOTE — ASSESSMENT & PLAN NOTE
· K 3 2 on admission, suspect related to HCTZ use; repleted in ED with K Dur 20 mEq x1  · Give an additional 40 mEq PO now  · Repeat BMP in AM

## 2018-08-11 NOTE — ASSESSMENT & PLAN NOTE
· Patient reports increased epigastric abdominal pain after having abdominal exam in the ED  · She does exhibit a tender epigastrum; she also reports GERD sx, but only takes omeprazole PRN and tries to use diet to control sx  · Increase PPI to BID dosing  · ED was initially concerned that epigastric abdominal pain was atypical ACS symptoms as patient has changes in EKG; review of EKG does not show significant abnormalities; her RALPH score is 1; there is low suspicion for ACS given that patient has has 2 negative troponins gurpreet span of 24 hours (if true ACS, would expect an upward trend by this point)  · Will place on telemetry and trend troponins

## 2018-08-11 NOTE — ED PROVIDER NOTES
History  Chief Complaint   Patient presents with    Back Pain     Here last night, found AAA and wanted to admit  left AMA  History provided by:  Patient   used: No    Back Pain   Associated symptoms: abdominal pain    Associated symptoms: no chest pain, no dysuria, no fever, no headaches and no weakness      Pt is a 48 y/o female presenting to ED after she left against advice yesteday  Pt continues with back pain and today has epigastric pain, she states sometimes she gets these  No n/v  No cp or sob today  No fevers  Yesterday had abnormal ecg and told she needs to stay for evaluation  mdm repeat ecg, abd labs, trop, discuss with pt      Prior to Admission Medications   Prescriptions Last Dose Informant Patient Reported? Taking?    ALPRAZolam (XANAX) 0 5 mg tablet   Yes Yes   Sig: Take 0 25 mg by mouth 2 (two) times a day as needed     Dapagliflozin-Metformin HCl (XIGDUO XR PO)   Yes Yes   Sig: Take 5 mg by mouth daily   Dulaglutide (TRULICITY) 1 5 EN/1 3WD SOPN   Yes Yes   Sig: Inject 1 5 mg under the skin once a week   Glucose Blood (TRUE FOCUS BLOOD GLUCOSE STRIP VI)   Yes Yes   Si Device by Does not apply route 4 (four) times a day   HYDROCHLOROTHIAZIDE PO   Yes Yes   Sig: Take 25 mg by mouth     Insulin Glulisine (APIDRA SOLOSTAR SC)   Yes Yes   Sig: Inject 20 Units under the skin 3 (three) times a day   Insulin Pen Needle (ULTICARE MICRO PEN NEEDLES) 32G X 4 MM MISC   Yes Yes   Si Device by Does not apply route 4 (four) times a day   Insulin Pen Needle (UNIFINE PENTIPS PLUS) 31G X 6 MM MISC   Yes Yes   Si Device by Does not apply route 4 (four) times a day   Lancets MISC   Yes Yes   Si Device by Does not apply route 4 (four) times a day   amLODIPine (NORVASC) 10 mg tablet   Yes Yes   Sig: Take 10 mg by mouth daily   cyclobenzaprine (FLEXERIL) 5 mg tablet   Yes Yes   Sig: Take 1 tablet by mouth 3 (three) times a day as needed     escitalopram (LEXAPRO) 10 mg tablet   Yes Yes   Sig: Take 5 mg by mouth daily   insulin glargine (LANTUS) 100 units/mL subcutaneous injection   Yes Yes   Sig: Inject 60 Units under the skin daily at bedtime   lisinopril (ZESTRIL) 40 mg tablet   Yes Yes   Sig: Take 40 mg by mouth daily   metoprolol tartrate (LOPRESSOR) 100 mg tablet   Yes Yes   Sig: Take 1 tablet by mouth daily   ondansetron (ZOFRAN) 4 mg tablet   Yes Yes   Sig: Take 1 tablet by mouth 3 (three) times a day as needed for nausea   oxyCODONE (OXY-IR) 5 MG capsule   Yes Yes   Sig: Take 1 tablet by mouth every 4 (four) hours as needed   simvastatin (ZOCOR) 10 mg tablet   Yes Yes   Sig: Take 1 tablet by mouth daily      Facility-Administered Medications: None       Past Medical History:   Diagnosis Date    Arthritis     Diabetes mellitus (HCC)     Fibromyalgia     GERD (gastroesophageal reflux disease)     Hyperlipidemia     Hypertension     Psychiatric disorder     anxiety       Past Surgical History:   Procedure Laterality Date    CHOLECYSTECTOMY      HYSTERECTOMY      OVARIAN CYST REMOVAL      TUBAL LIGATION         Family History   Problem Relation Age of Onset    Hypertension Mother     Diabetes Father     Other Sister         renal cell carcinoma    Diabetes Other      I have reviewed and agree with the history as documented  Social History   Substance Use Topics    Smoking status: Current Every Day Smoker     Packs/day: 1 00    Smokeless tobacco: Never Used    Alcohol use No        Review of Systems   Constitutional: Negative for chills, diaphoresis and fever  Respiratory: Negative for cough, shortness of breath, wheezing and stridor  Cardiovascular: Negative for chest pain, palpitations and leg swelling  Gastrointestinal: Positive for abdominal pain  Negative for blood in stool, diarrhea, nausea and vomiting  Genitourinary: Negative for dysuria, frequency and urgency  Musculoskeletal: Positive for back pain   Negative for neck pain and neck stiffness  Skin: Negative for pallor and rash  Neurological: Negative for dizziness, syncope, weakness, light-headedness and headaches  All other systems reviewed and are negative  Physical Exam  Physical Exam   Constitutional: She is oriented to person, place, and time  She appears well-developed and well-nourished  HENT:   Head: Normocephalic and atraumatic  Eyes: Pupils are equal, round, and reactive to light  Neck: Neck supple  Cardiovascular: Normal rate, regular rhythm, normal heart sounds and intact distal pulses  Pulmonary/Chest: Effort normal and breath sounds normal  No respiratory distress  Abdominal: Soft  Bowel sounds are normal  There is tenderness  epigastric   Musculoskeletal: Normal range of motion  She exhibits no edema or tenderness  Neurological: She is alert and oriented to person, place, and time  Skin: Skin is warm and dry  Capillary refill takes less than 2 seconds  No rash noted  No erythema  Vitals reviewed        Vital Signs  ED Triage Vitals [08/11/18 1424]   Temperature Pulse Respirations Blood Pressure SpO2   98 1 °F (36 7 °C) 96 16 170/85 99 %      Temp Source Heart Rate Source Patient Position - Orthostatic VS BP Location FiO2 (%)   Oral Monitor Sitting Left arm --      Pain Score       8           Vitals:    08/11/18 1841 08/11/18 2256 08/12/18 0700 08/12/18 1500   BP: 147/81 155/88 161/89 161/93   Pulse: 85 82 73 80   Patient Position - Orthostatic VS: Sitting Lying Lying Lying       Visual Acuity      ED Medications  Medications   morphine (PF) 4 mg/mL injection 4 mg (4 mg Intravenous Given 8/11/18 1640)   aspirin chewable tablet 324 mg (324 mg Oral Given 8/11/18 1638)   potassium chloride (K-DUR,KLOR-CON) CR tablet 20 mEq (20 mEq Oral Given 8/11/18 1825)   potassium chloride (K-DUR,KLOR-CON) CR tablet 40 mEq (40 mEq Oral Given 8/11/18 1958)       Diagnostic Studies  Results Reviewed     Procedure Component Value Units Date/Time    Comprehensive metabolic panel [[id number]]  (Abnormal) Collected:  08/11/18 1543    Lab Status:  Final result Specimen:  Blood from Arm, Right Updated:  08/11/18 1618     Sodium 141 mmol/L      Potassium 3 2 (L) mmol/L      Chloride 102 mmol/L      CO2 31 mmol/L      Anion Gap 8 mmol/L      BUN 8 mg/dL      Creatinine 0 70 mg/dL      Glucose 215 (H) mg/dL      Calcium 9 0 mg/dL      AST 18 U/L      ALT 33 U/L      Alkaline Phosphatase 105 U/L      Total Protein 7 2 g/dL      Albumin 3 3 (L) g/dL      Total Bilirubin 0 40 mg/dL      eGFR 115 ml/min/1 73sq m     Narrative:         National Kidney Disease Education Program recommendations are as follows:  GFR calculation is accurate only with a steady state creatinine  Chronic Kidney disease less than 60 ml/min/1 73 sq  meters  Kidney failure less than 15 ml/min/1 73 sq  meters      Lipase [[id number]]  (Normal) Collected:  08/11/18 1543    Lab Status:  Final result Specimen:  Blood from Arm, Right Updated:  08/11/18 1618     Lipase 166 u/L     Troponin I [[id number]]  (Normal) Collected:  08/11/18 1543    Lab Status:  Final result Specimen:  Blood from Arm, Right Updated:  08/11/18 1618     Troponin I <0 02 ng/mL     CBC and differential [[id number]]  (Abnormal) Collected:  08/11/18 1543    Lab Status:  Final result Specimen:  Blood from Arm, Right Updated:  08/11/18 1555     WBC 11 67 (H) Thousand/uL      RBC 4 89 Million/uL      Hemoglobin 13 7 g/dL      Hematocrit 41 0 %      MCV 84 fL      MCH 28 0 pg      MCHC 33 4 g/dL      RDW 13 7 %      MPV 10 6 fL      Platelets 093 Thousands/uL      nRBC 0 /100 WBCs      Neutrophils Relative 70 %      Immat GRANS % 0 %      Lymphocytes Relative 23 %      Monocytes Relative 6 %      Eosinophils Relative 1 %      Basophils Relative 0 %      Neutrophils Absolute 8 02 (H) Thousands/µL      Immature Grans Absolute 0 04 Thousand/uL      Lymphocytes Absolute 2 71 Thousands/µL      Monocytes Absolute 0 70 Thousand/µL      Eosinophils Absolute 0 16 Thousand/µL      Basophils Absolute 0 04 Thousands/µL                  MRI thoracic spine wo contrast   Final Result by Milena Shukla MD (08/13 1323)      Minimal multilevel disc degeneration mainly in the mid thoracic spine without spinal canal or neural foraminal stenosis  Workstation performed: [id number]         MRI lumbar spine wo contrast   Final Result by Robbi Cheng MD (08/13 1316)   Multilevel degenerative spondylosis      No evidence of disc herniation, central canal or foraminal stenosis               Workstation performed: [id number]         VAS renal artery complete   Final Result by Prince Aleyda DO (08/12 2028)                 Procedures  Procedures       Phone Contacts  ED Phone Contact    ED Course  ED Course as of Aug 15 0416   Sat Aug 11, 2018   1538 Ecg shows rate of 82, sinus, pvc, left axis deviation, wide qrs, flipped t waves inferiorly stable from yesterday, yesterday had flipped t waves anteriorly, today they are upright   Independently interpreted by me                        RALPH Risk Score      Most Recent Value   Age >= 72  0 Filed at: 08/11/2018 1652   Known CAD (stenosis >= 50%)  0 Filed at: 08/11/2018 1652   Recent (<=24 hrs) Service Angina  0 Filed at: 08/11/2018 1652   ST Deviation >= 0 5 mm  0 Filed at: 08/11/2018 1652   3+ CAD Risk Factors (FHx, HTN, HLP, DM, Smoker)  1 Filed at: 08/11/2018 1652   Aspirin Use Past 7 Days  0 Filed at: 08/11/2018 1652   Elevated Cardiac Markers  0 Filed at: 08/11/2018 1652   RALPH Risk Score (Calculated)  1 Filed at: 08/11/2018 1652              MDM  CritCare Time    Disposition  Final diagnoses:   Epigastric pain   EKG abnormality     Time reflects when diagnosis was documented in both MDM as applicable and the Disposition within this note     Time User Action Codes Description Comment    8/11/2018  4:42 PM Miroslava Schmitt Add [R10 13] Epigastric pain     8/11/2018  4:42 PM Miroslava Schmitt Add [R94 31] EKG abnormality     8/12/2018  6:34 PM AmilcarEnrico huntley Add [G89 4] Chronic pain disorder     8/12/2018  6:34 PM Chata Wigginsato Add [I10] Uncontrolled hypertension     8/12/2018  6:34 PM AmilcarChata huntleyato Add [K21 0] Gastroesophageal reflux disease with esophagitis     8/12/2018  6:34 PM Ernico Wiggins Add [R82 90] Abnormal urinalysis       ED Disposition     ED Disposition Condition Comment    Admit  Case was discussed with medicine and the patient's admission status was agreed to be Admission Status: observation status to the service of Dr Hermelindo Lockhart           Follow-up Information    None         Discharge Medication List as of 8/12/2018  6:47 PM      CONTINUE these medications which have CHANGED    Details   hydrALAZINE (APRESOLINE) 25 mg tablet Take 1 tablet (25 mg total) by mouth every 8 (eight) hours, Starting Sun 8/12/2018, Print      methocarbamol (ROBAXIN) 500 mg tablet Take 1 tablet (500 mg total) by mouth every 6 (six) hours as needed for muscle spasms for up to 3 days, Starting Sun 8/12/2018, Until Wed 8/15/2018, Print      metoprolol tartrate (LOPRESSOR) 100 mg tablet Take 1 tablet (100 mg total) by mouth every 12 (twelve) hours, Starting Sun 8/12/2018, Print      oxyCODONE (OXY-IR) 5 MG capsule Take 2 capsules (10 mg total) by mouth every 4 (four) hours as needed for moderate pain for up to 5 days Earliest Fill Date: 8/12/18 Max Daily Amount: 60 mg, Starting Sun 8/12/2018, Until Fri 8/17/2018, Print      pantoprazole (PROTONIX) 20 mg tablet Take 1 tablet (20 mg total) by mouth 2 (two) times a day before meals, Starting Sun 8/12/2018, Print      sulfamethoxazole-trimethoprim (BACTRIM DS) 800-160 mg per tablet Take 1 tablet by mouth every 12 (twelve) hours for 3 days, Starting Sun 8/12/2018, Until Wed 8/15/2018, Print         CONTINUE these medications which have NOT CHANGED    Details   ALPRAZolam (XANAX) 0 5 mg tablet Take 0 25 mg by mouth 2 (two) times a day as needed  , Starting Fri 9/22/2017, Historical Med      amLODIPine (NORVASC) 10 mg tablet Take 10 mg by mouth daily, Until Discontinued, Historical Med      Dapagliflozin-Metformin HCl (XIGDUO XR PO) Take 5 mg by mouth daily, Historical Med      Dulaglutide (TRULICITY) 1 5 UY/2 3EV SOPN Inject 1 5 mg under the skin once a week, Historical Med      escitalopram (LEXAPRO) 10 mg tablet Take 5 mg by mouth daily, Historical Med      Glucose Blood (TRUE FOCUS BLOOD GLUCOSE STRIP VI) 1 Device by Does not apply route 4 (four) times a day, Starting Mon 5/9/2016, Historical Med      HYDROCHLOROTHIAZIDE PO Take 25 mg by mouth  , Historical Med      insulin glargine (LANTUS) 100 units/mL subcutaneous injection Inject 60 Units under the skin daily at bedtime, Until Discontinued, Historical Med      Insulin Glulisine (APIDRA SOLOSTAR SC) Inject 20 Units under the skin 3 (three) times a day, Until Discontinued, Historical Med      !! Insulin Pen Needle (ULTICARE MICRO PEN NEEDLES) 32G X 4 MM MISC 1 Device by Does not apply route 4 (four) times a day, Starting Tue 6/6/2017, Historical Med      !! Insulin Pen Needle (UNIFINE PENTIPS PLUS) 31G X 6 MM MISC 1 Device by Does not apply route 4 (four) times a day, Starting Mon 6/5/2017, Historical Med      Lancets MISC 1 Device by Does not apply route 4 (four) times a day, Historical Med      lisinopril (ZESTRIL) 40 mg tablet Take 40 mg by mouth daily, Until Discontinued, Historical Med      ondansetron (ZOFRAN) 4 mg tablet Take 1 tablet by mouth 3 (three) times a day as needed for nausea, Starting Sun 1/7/2018, Historical Med      simvastatin (ZOCOR) 10 mg tablet Take 1 tablet by mouth daily, Starting Wed 4/5/2017, Historical Med       !! - Potential duplicate medications found  Please discuss with provider        STOP taking these medications       cyclobenzaprine (FLEXERIL) 5 mg tablet Comments:   Reason for Stopping:               Outpatient Discharge Orders  Activity as tolerated     Call provider for:  severe uncontrolled pain     No dressing needed ED Provider  Electronically Signed by           Yulisa Rosales MD  08/15/18 1002

## 2018-08-11 NOTE — ED NOTES
Patient awake and alert  No distress noted  No further questions upon discharge       Odette Medina RN  08/10/18 2428

## 2018-08-11 NOTE — ASSESSMENT & PLAN NOTE
No results found for: HGBA1C    Recent Labs      08/11/18   1850   POCGLU  204*       Blood Sugar Average: Last 72 hrs:  (P) 204   Poorly controlled despite PO and insulin regimen  Continue home insulin schedule + SSI for correction  Continue statin

## 2018-08-11 NOTE — ED NOTES
Patient transported to room 321 and placed on telemetry, confirmed transmission of telemetry unit to central monitor,  RN notified of Patient arrival to unit     Berenicecarriezahraa Conde  08/11/18 8904

## 2018-08-11 NOTE — ASSESSMENT & PLAN NOTE
· Patient has had ongoing back pain for the last 2 years   Patient reports that she typically works through the pain as best as she can, but sometimes it gets to be 'too much'  · She does not state that the pain is worse than usual, but she does state that her home oxy dose is not helping her pain   · She also has increased weakness in her LLE, some of which is related to pain  · Will check MRI of full spine  · Pain control: toradol, robaxin, increase oxy to 10, morphine 2mg IV Q4 hr PRN breakthrough pain  · Knowing this pain has been ongoing for 2 years, the hope is for reduction (not resolution) of pain prior to discharge for patient

## 2018-08-11 NOTE — H&P
H&P- Rita Mera 1965, 47 y o  female MRN: [id number]    Unit/Bed#: -01 Encounter: [id number]    Primary Care Provider: Tressia Holstein, MD   Date and time admitted to hospital: 8/11/2018  2:55 PM    * Uncontrolled hypertension   Assessment & Plan    · /100  Patient reports that she "often" has BPs that are 865 systolic, sometimes more  · It appears that previously patient was not taking her medications as directed (review of outpatient note in April); however, patient reports compliance with her medications  Her current BP meds include: amlodipine 10mg PO QD, HCTZ 25mg PO QD, metoprolol tartate 100mg PO QD, lisinopril 40mg PO QD  In setting of newly diagnosed AAA, would opt for better blood pressure control   · Will continue amlodipine at current dose, HCTZ at current dose, increase metoprolol to 100mg PO BID, continue lisinopril 40mg PO QD  · Will also pursue secondary HTN work up:   · Renin  · Aldosterone  · Serum metanephrines   · Renal artery US         Chronic pain disorder   Assessment & Plan    · Patient has had ongoing back pain for the last 2 years   Patient reports that she typically works through the pain as best as she can, but sometimes it gets to be 'too much'  · She does not state that the pain is worse than usual, but she does state that her home oxy dose is not helping her pain   · She also has increased weakness in her LLE, some of which is related to pain  · Will check MRI of full spine  · Pain control: toradol, robaxin, increase oxy to 10, morphine 2mg IV Q4 hr PRN breakthrough pain  · Knowing this pain has been ongoing for 2 years, the hope is for reduction (not resolution) of pain prior to discharge for patient         Gastroesophageal reflux disease with esophagitis   Assessment & Plan    · Patient reports increased epigastric abdominal pain after having abdominal exam in the ED  · She does exhibit a tender epigastrum; she also reports GERD sx, but only takes omeprazole PRN and tries to use diet to control sx  · Increase PPI to BID dosing  · ED was initially concerned that epigastric abdominal pain was atypical ACS symptoms as patient has changes in EKG; review of EKG does not show significant abnormalities; her RALPH score is 1; there is low suspicion for ACS given that patient has has 2 negative troponins gurpreet span of 24 hours (if true ACS, would expect an upward trend by this point)  · Will place on telemetry and trend troponins         Hypokalemia   Assessment & Plan    · K 3 2 on admission, suspect related to HCTZ use; repleted in ED with K Dur 20 mEq x1  · Give an additional 40 mEq PO now  · Repeat BMP in AM        Hyperlipidemia associated with type 2 diabetes mellitus ([facility] [address] )   Assessment & Plan    No results found for: HGBA1C    Recent Labs      08/11/18   1850   POCGLU  204*       Blood Sugar Average: Last 72 hrs:  (P) 204   Poorly controlled despite PO and insulin regimen  Continue home insulin schedule + SSI for correction  Continue statin        Abnormal urinalysis   Assessment & Plan    · Patient's US is not the best specimen (moderate epithelial cells)- glucose/blood/lrg leuks/30-50 WBC/innum bacteria noted; no urinary complaints  · Will repeat UA to ensure poor specimen vs  Acute UTI            VTE Prophylaxis: Enoxaparin (Lovenox)  / sequential compression device   Code Status: Level 1-- Full Code  POLST: There is no POLST form on file for this patient (pre-hospital)  Discussion with family:     Anticipated Length of Stay:  Patient will be admitted on an Observation basis with an anticipated length of stay of  < 2 midnights  Justification for Hospital Stay: pain control, tele montoring    Total Time for Visit, including Counseling / Coordination of Care: 30 minutes  Greater than 50% of this total time spent on direct patient counseling and coordination of care      Chief Complaint:   Back pain     History of Present Illness:    Jaime Escalante is a 47 y o  female with multiple medical conditions presents to the ED for evaluation of uncontrolled back pain x2 days  Patient reports she has had chronic back pain x2 years and reports that most of the time it is controlled with her narcotic pain medication, but over the last 48 hours she has not been able to handle her pain and her oxy is not helping  She doesn't report worsening of pain, but states that she cannot tolerated her level of pain at this time  Patient reports that last night she presented to the ED for back pain/hand pain/foot pain  She reports that her back pain started in her back and radiated down into her L leg  She stated the pain was shooting/sharp in nature with radiation down her whole left leg  No bowel/bladder incontinence  Patient mostly reports weakness 2/2 pain-- stating that once she feels pain she doesn't want to move  No chest pain/SOB  NO nausea/vomiting  Patient has also been having epigastric abdominal pain with reflux sensation since arriving in the ED  She states that this started after the ED physician performed an abdominal exam on the patient  She reports typically using diet control to control her GERD symptoms, but will take omeprazole and Tums as needed  Review of Systems:    Review of Systems   Constitutional: Negative  HENT: Negative  Eyes: Negative  Respiratory: Negative  Cardiovascular: Negative  Gastrointestinal: Positive for abdominal pain  Genitourinary: Negative  Musculoskeletal: Positive for arthralgias, back pain and gait problem  Skin: Negative  Hematological: Negative  Psychiatric/Behavioral: Negative          Past Medical and Surgical History:     Past Medical History:   Diagnosis Date    Arthritis     Diabetes mellitus ([facility] [address] )     Fibromyalgia     GERD (gastroesophageal reflux disease)     Hyperlipidemia     Hypertension     Psychiatric disorder     anxiety       Past Surgical History:   Procedure Laterality Date    CHOLECYSTECTOMY      HYSTERECTOMY      OVARIAN CYST REMOVAL      TUBAL LIGATION         Meds/Allergies:    Prior to Admission medications    Medication Sig Start Date End Date Taking?  Authorizing Provider   ALPRAZolam Areatha Adamson) 0 5 mg tablet Take 0 25 mg by mouth 2 (two) times a day as needed   9/22/17  Yes Historical Provider, MD   amLODIPine (NORVASC) 10 mg tablet Take 10 mg by mouth daily   Yes Historical Provider, MD   cyclobenzaprine (FLEXERIL) 5 mg tablet Take 1 tablet by mouth 3 (three) times a day as needed   4/24/17  Yes Historical Provider, MD   Dapagliflozin-Metformin HCl (XIGDUO XR PO) Take 5 mg by mouth daily   Yes Historical Provider, MD   Dulaglutide (TRULICITY) 1 5 GT/1 0WG SOPN Inject 1 5 mg under the skin once a week   Yes Historical Provider, MD   escitalopram (LEXAPRO) 10 mg tablet Take 5 mg by mouth daily   Yes Historical Provider, MD   Glucose Blood (TRUE FOCUS BLOOD GLUCOSE STRIP VI) 1 Device by Does not apply route 4 (four) times a day 5/9/16  Yes Historical Provider, MD   HYDROCHLOROTHIAZIDE PO Take 25 mg by mouth     Yes Historical Provider, MD   insulin glargine (LANTUS) 100 units/mL subcutaneous injection Inject 60 Units under the skin daily at bedtime   Yes Historical Provider, MD   Insulin Glulisine (APIDRA SOLOSTAR SC) Inject 20 Units under the skin 3 (three) times a day   Yes Historical Provider, MD   Insulin Pen Needle (ULTICARE MICRO PEN NEEDLES) 32G X 4 MM MISC 1 Device by Does not apply route 4 (four) times a day 6/6/17  Yes Historical Provider, MD   Insulin Pen Needle (UNIFINE PENTIPS PLUS) 31G X 6 MM MISC 1 Device by Does not apply route 4 (four) times a day 6/5/17  Yes Historical Provider, MD   Lancets MISC 1 Device by Does not apply route 4 (four) times a day   Yes Historical Provider, MD   lisinopril (ZESTRIL) 40 mg tablet Take 40 mg by mouth daily   Yes Historical Provider, MD   metoprolol tartrate (LOPRESSOR) 100 mg tablet Take 1 tablet by mouth daily 4/5/17  Yes Historical Provider, MD ondansetron (ZOFRAN) 4 mg tablet Take 1 tablet by mouth 3 (three) times a day as needed for nausea 1/7/18  Yes Historical Provider, MD   oxyCODONE (OXY-IR) 5 MG capsule Take 1 tablet by mouth every 4 (four) hours as needed 3/14/17  Yes Historical Provider, MD   simvastatin (ZOCOR) 10 mg tablet Take 1 tablet by mouth daily 4/5/17  Yes Historical Provider, MD   fluconazole (DIFLUCAN) 150 mg tablet Take 1 tablet daily for 3 days, then take 1 tablet weekly for 6 months  4/12/18 8/11/18  NEELAM Evans   gabapentin (NEURONTIN) 600 MG tablet Take 1 tablet by mouth daily 6/6/17 8/11/18  Historical Provider, MD   gabapentin (NEURONTIN) 600 MG tablet Take 600 mg by mouth 3 (three) times a day as needed 9/22/17 8/11/18  Historical Provider, MD   meloxicam (MOBIC) 15 mg tablet Take 1 tablet by mouth daily as needed for pain 2/22/18 8/11/18  Historical Provider, MD   oseltamivir (TAMIFLU) 75 mg capsule Take 75 mg by mouth 2 (two) times a day 1/7/18 8/11/18  Historical Provider, MD   sertraline (ZOLOFT) 25 mg tablet Take 2 tablets by mouth daily at bedtime 9/22/17 8/11/18  Historical Provider, MD     I have reviewed home medications with patient personally      Allergies: No Known Allergies    Social History:     Marital Status: /Civil Union   Occupation: home health aide  Patient Pre-hospital Living Situation: home with family  Patient Pre-hospital Level of Mobility: independent  Patient Pre-hospital Diet Restrictions: none  Substance Use History:   History   Alcohol Use No     History   Smoking Status    Current Every Day Smoker    Packs/day: 1 00   Smokeless Tobacco    Never Used     History   Drug Use No       Family History:    non-contributory    Physical Exam:     Vitals:   Blood Pressure: 147/81 (08/11/18 1841)  Pulse: 85 (08/11/18 1841)  Temperature: 98 °F (36 7 °C) (08/11/18 1841)  Temp Source: Oral (08/11/18 1841)  Respirations: 16 (08/11/18 1841)  Height: 5' 10" (177 8 cm) (08/11/18 1841)  Weight - Scale: 87 5 kg (192 lb 14 4 oz) (08/11/18 1841)  SpO2: 97 % (08/11/18 1841)    Physical Exam   Constitutional: She is oriented to person, place, and time  She appears well-developed and well-nourished  She appears distressed  HENT:   Head: Normocephalic and atraumatic  Mouth/Throat: No oropharyngeal exudate  Eyes: Conjunctivae and EOM are normal  Pupils are equal, round, and reactive to light  No scleral icterus  Neck: No JVD present  Cardiovascular: Normal rate and regular rhythm  Exam reveals no gallop and no friction rub  No murmur heard  Pulmonary/Chest: Effort normal and breath sounds normal  No respiratory distress  She has no wheezes  She has no rales  Abdominal: Soft  Bowel sounds are normal  She exhibits no distension  There is tenderness (mid epigastrum)  There is no rebound  Musculoskeletal: She exhibits tenderness (midline throaci and lumbar spine)  She exhibits no edema  Passive ROM is full, but painful with internal and external rotation of the L hip and flexion of the L hip   Neurological: She is alert and oriented to person, place, and time  She displays normal reflexes  No cranial nerve deficit  She exhibits abnormal muscle tone (weakness LLE (partially related to pain))  Skin: Skin is warm and dry  No rash noted  She is not diaphoretic  No erythema  Psychiatric: She has a normal mood and affect  Her behavior is normal        Additional Data:     Lab Results: I have personally reviewed pertinent reports          Results from last 7 days  Lab Units 08/11/18  1543   WBC Thousand/uL 11 67*   HEMOGLOBIN g/dL 13 7   HEMATOCRIT % 41 0   PLATELETS Thousands/uL 296   NEUTROS PCT % 70   LYMPHS PCT % 23   MONOS PCT % 6   EOS PCT % 1       Results from last 7 days  Lab Units 08/11/18  1543   SODIUM mmol/L 141   POTASSIUM mmol/L 3 2*   CHLORIDE mmol/L 102   CO2 mmol/L 31   BUN mg/dL 8   CREATININE mg/dL 0 70   CALCIUM mg/dL 9 0   TOTAL PROTEIN g/dL 7 2   BILIRUBIN TOTAL mg/dL 0 40 ALK PHOS U/L 105   ALT U/L 33   AST U/L 18   GLUCOSE RANDOM mg/dL 215*           Results from last 7 days  Lab Units 08/11/18  1850   POC GLUCOSE mg/dl 204*           Imaging: I have personally reviewed pertinent reports  VAS renal artery complete    (Results Pending)   MRI inpatient order    (Results Pending)       EKG, Pathology, and Other Studies Reviewed on Admission:   · EKG: NSR    Allscripts / Epic Records Reviewed: Yes     ** Please Note: This note has been constructed using a voice recognition system   **

## 2018-08-11 NOTE — ASSESSMENT & PLAN NOTE
· Patient's US is not the best specimen (moderate epithelial cells)- glucose/blood/lrg leuks/30-50 WBC/innum bacteria noted; no urinary complaints  · Will repeat UA to ensure poor specimen vs  Acute UTI

## 2018-08-11 NOTE — DISCHARGE INSTRUCTIONS
Chronic Hypertension, Ambulatory Care   GENERAL INFORMATION:   Chronic hypertension  is a long-term condition in which your blood pressure (BP) is higher than normal  Your BP is the force of your blood moving against the walls of your arteries  Hypertension is a BP of 140/90 or higher  Common symptoms include the following:   · Headache     · Blurred vision    · Chest pain     · Dizziness or weakness     · Trouble breathing     · Nosebleeds  Seek immediate care for the following symptoms:   · Severe headache or vision loss    · Weakness in an arm or leg    · Confusion or difficulty speaking    · Discomfort in your chest that feels like squeezing, pressure, fullness, or pain    · Suddenly feeling lightheaded or trouble breathing    · Pain or discomfort in your back, neck, jaw, stomach, or arm  Treatment for chronic hypertension  may include medicine to lower your BP  You may also need to make lifestyle changes  Take your medicine exactly as directed  Manage chronic hypertension:   · Take your BP at home  Sit and rest for 5 minutes before you take your BP  Extend your arm and support it on a flat surface  Your arm should be at the same level as your heart  Follow the directions that came with your BP monitor  If possible, take at least 2 BP readings each time  Take your BP at least twice a day at the same times each day, such as morning and evening  Keep a log of your BP readings and bring it to your follow-up visits  · Eat less sodium (salt)  Do not add sodium to your food  Limit foods that are high in sodium, such as canned foods, potato chips, and cold cuts  Your healthcare provider may suggest that you follow the [address] [place] [address] Street  The plan is low in sodium, unhealthy fats, and total fat  It is high in potassium, calcium, and fiber  · Exercise regularly  Exercise at least 30 minutes per day, on most days of the week  This will help decrease your BP   Ask your healthcare provider about the best exercise plan for you  · Limit alcohol  Women should limit alcohol to 1 drink a day  Men should limit alcohol to 2 drinks a day  A drink of alcohol is 12 ounces of beer, 5 ounces of wine, or 1½ ounces of liquor  · Do not smoke  If you smoke, it is never too late to quit  Smoking can increase your BP  Smoking also worsens other health conditions you may have that can increase your risk for hypertension  Ask your healthcare provider for information if you need help quitting  Follow up with your healthcare provider as directed: You will need to return to have your BP checked and to have other lab tests done  Write down your questions so you remember to ask them during your visits  CARE AGREEMENT:   You have the right to help plan your care  Learn about your health condition and how it may be treated  Discuss treatment options with your caregivers to decide what care you want to receive  You always have the right to refuse treatment  The above information is an  only  It is not intended as medical advice for individual conditions or treatments  Talk to your doctor, nurse or pharmacist before following any medical regimen to see if it is safe and effective for you  © 2014 [address] is for End User's use only and may not be sold, redistributed or otherwise used for commercial purposes  All illustrations and images included in CareNotes® are the copyrighted property of A D A M [employer] Inc  or Aiden Contreras

## 2018-08-12 ENCOUNTER — APPOINTMENT (OUTPATIENT)
Dept: ULTRASOUND IMAGING | Facility: HOSPITAL | Age: 53
End: 2018-08-12
Payer: COMMERCIAL

## 2018-08-12 ENCOUNTER — APPOINTMENT (OUTPATIENT)
Dept: MRI IMAGING | Facility: HOSPITAL | Age: 53
End: 2018-08-12
Payer: COMMERCIAL

## 2018-08-12 VITALS
HEIGHT: 70 IN | RESPIRATION RATE: 18 BRPM | HEART RATE: 80 BPM | SYSTOLIC BLOOD PRESSURE: 161 MMHG | TEMPERATURE: 97.9 F | BODY MASS INDEX: 27.62 KG/M2 | OXYGEN SATURATION: 97 % | WEIGHT: 192.9 LBS | DIASTOLIC BLOOD PRESSURE: 93 MMHG

## 2018-08-12 PROBLEM — E87.6 HYPOKALEMIA: Status: RESOLVED | Noted: 2018-08-11 | Resolved: 2018-08-12

## 2018-08-12 PROBLEM — I71.20 THORACIC AORTIC ANEURYSM WITHOUT RUPTURE: Status: ACTIVE | Noted: 2018-08-12

## 2018-08-12 PROBLEM — I71.2 THORACIC AORTIC ANEURYSM WITHOUT RUPTURE (HCC): Status: ACTIVE | Noted: 2018-08-12

## 2018-08-12 LAB
ANION GAP SERPL CALCULATED.3IONS-SCNC: 8 MMOL/L (ref 4–13)
ATRIAL RATE: 82 BPM
ATRIAL RATE: 83 BPM
BUN SERPL-MCNC: 12 MG/DL (ref 5–25)
CALCIUM SERPL-MCNC: 8.6 MG/DL (ref 8.3–10.1)
CHLORIDE SERPL-SCNC: 103 MMOL/L (ref 100–108)
CO2 SERPL-SCNC: 27 MMOL/L (ref 21–32)
CORTIS AM PEAK SERPL-MCNC: 10.4 UG/DL (ref 4.2–22.4)
CREAT SERPL-MCNC: 0.65 MG/DL (ref 0.6–1.3)
ERYTHROCYTE [DISTWIDTH] IN BLOOD BY AUTOMATED COUNT: 14 % (ref 11.6–15.1)
GFR SERPL CREATININE-BSD FRML MDRD: 118 ML/MIN/1.73SQ M
GLUCOSE P FAST SERPL-MCNC: 270 MG/DL (ref 65–99)
GLUCOSE SERPL-MCNC: 196 MG/DL (ref 65–140)
GLUCOSE SERPL-MCNC: 228 MG/DL (ref 65–140)
GLUCOSE SERPL-MCNC: 260 MG/DL (ref 65–140)
GLUCOSE SERPL-MCNC: 270 MG/DL (ref 65–140)
HCT VFR BLD AUTO: 39.4 % (ref 34.8–46.1)
HGB BLD-MCNC: 13 G/DL (ref 11.5–15.4)
MAGNESIUM SERPL-MCNC: 1.6 MG/DL (ref 1.6–2.6)
MCH RBC QN AUTO: 27.5 PG (ref 26.8–34.3)
MCHC RBC AUTO-ENTMCNC: 33 G/DL (ref 31.4–37.4)
MCV RBC AUTO: 84 FL (ref 82–98)
P AXIS: 33 DEGREES
P AXIS: 38 DEGREES
PLATELET # BLD AUTO: 276 THOUSANDS/UL (ref 149–390)
PMV BLD AUTO: 10.5 FL (ref 8.9–12.7)
POTASSIUM SERPL-SCNC: 3.6 MMOL/L (ref 3.5–5.3)
PR INTERVAL: 182 MS
PR INTERVAL: 184 MS
QRS AXIS: -45 DEGREES
QRS AXIS: -47 DEGREES
QRSD INTERVAL: 106 MS
QRSD INTERVAL: 108 MS
QT INTERVAL: 404 MS
QT INTERVAL: 416 MS
QTC INTERVAL: 472 MS
QTC INTERVAL: 488 MS
RBC # BLD AUTO: 4.72 MILLION/UL (ref 3.81–5.12)
SODIUM SERPL-SCNC: 138 MMOL/L (ref 136–145)
T WAVE AXIS: -16 DEGREES
T WAVE AXIS: -25 DEGREES
TROPONIN I SERPL-MCNC: <0.02 NG/ML
VENTRICULAR RATE: 82 BPM
VENTRICULAR RATE: 83 BPM
WBC # BLD AUTO: 9.02 THOUSAND/UL (ref 4.31–10.16)

## 2018-08-12 PROCEDURE — 72146 MRI CHEST SPINE W/O DYE: CPT

## 2018-08-12 PROCEDURE — 84244 ASSAY OF RENIN: CPT | Performed by: INTERNAL MEDICINE

## 2018-08-12 PROCEDURE — 83735 ASSAY OF MAGNESIUM: CPT | Performed by: PHYSICIAN ASSISTANT

## 2018-08-12 PROCEDURE — 82533 TOTAL CORTISOL: CPT | Performed by: INTERNAL MEDICINE

## 2018-08-12 PROCEDURE — 93975 VASCULAR STUDY: CPT | Performed by: SURGERY

## 2018-08-12 PROCEDURE — 93975 VASCULAR STUDY: CPT

## 2018-08-12 PROCEDURE — 72148 MRI LUMBAR SPINE W/O DYE: CPT

## 2018-08-12 PROCEDURE — 82948 REAGENT STRIP/BLOOD GLUCOSE: CPT

## 2018-08-12 PROCEDURE — 80048 BASIC METABOLIC PNL TOTAL CA: CPT | Performed by: PHYSICIAN ASSISTANT

## 2018-08-12 PROCEDURE — 85027 COMPLETE CBC AUTOMATED: CPT | Performed by: PHYSICIAN ASSISTANT

## 2018-08-12 PROCEDURE — 93010 ELECTROCARDIOGRAM REPORT: CPT | Performed by: INTERNAL MEDICINE

## 2018-08-12 PROCEDURE — 99217 PR OBSERVATION CARE DISCHARGE MANAGEMENT: CPT | Performed by: PHYSICIAN ASSISTANT

## 2018-08-12 PROCEDURE — 84484 ASSAY OF TROPONIN QUANT: CPT | Performed by: PHYSICIAN ASSISTANT

## 2018-08-12 PROCEDURE — 83835 ASSAY OF METANEPHRINES: CPT | Performed by: INTERNAL MEDICINE

## 2018-08-12 RX ORDER — OXYCODONE HYDROCHLORIDE 5 MG/1
10 CAPSULE ORAL EVERY 4 HOURS PRN
Qty: 30 CAPSULE | Refills: 0 | Status: SHIPPED | OUTPATIENT
Start: 2018-08-12 | End: 2018-08-17

## 2018-08-12 RX ORDER — PANTOPRAZOLE SODIUM 20 MG/1
20 TABLET, DELAYED RELEASE ORAL
Qty: 60 TABLET | Refills: 0 | Status: SHIPPED | OUTPATIENT
Start: 2018-08-12 | End: 2019-07-02

## 2018-08-12 RX ORDER — SULFAMETHOXAZOLE AND TRIMETHOPRIM 800; 160 MG/1; MG/1
1 TABLET ORAL EVERY 12 HOURS SCHEDULED
Qty: 6 TABLET | Refills: 0 | Status: SHIPPED | OUTPATIENT
Start: 2018-08-12 | End: 2018-08-15

## 2018-08-12 RX ORDER — METHOCARBAMOL 500 MG/1
500 TABLET, FILM COATED ORAL EVERY 6 HOURS PRN
Qty: 12 TABLET | Refills: 0 | Status: SHIPPED | OUTPATIENT
Start: 2018-08-12 | End: 2018-08-12

## 2018-08-12 RX ORDER — SULFAMETHOXAZOLE AND TRIMETHOPRIM 800; 160 MG/1; MG/1
1 TABLET ORAL EVERY 12 HOURS SCHEDULED
Qty: 6 TABLET | Refills: 0 | Status: SHIPPED | OUTPATIENT
Start: 2018-08-12 | End: 2018-08-12

## 2018-08-12 RX ORDER — HYDRALAZINE HYDROCHLORIDE 25 MG/1
25 TABLET, FILM COATED ORAL EVERY 8 HOURS SCHEDULED
Qty: 90 TABLET | Refills: 1 | Status: SHIPPED | OUTPATIENT
Start: 2018-08-12 | End: 2018-08-12

## 2018-08-12 RX ORDER — HYDRALAZINE HYDROCHLORIDE 25 MG/1
25 TABLET, FILM COATED ORAL EVERY 8 HOURS SCHEDULED
Qty: 90 TABLET | Refills: 0 | Status: SHIPPED | OUTPATIENT
Start: 2018-08-12 | End: 2020-06-25 | Stop reason: ALTCHOICE

## 2018-08-12 RX ORDER — PANTOPRAZOLE SODIUM 20 MG/1
20 TABLET, DELAYED RELEASE ORAL
Qty: 60 TABLET | Refills: 1 | Status: SHIPPED | OUTPATIENT
Start: 2018-08-12 | End: 2018-08-12

## 2018-08-12 RX ORDER — HYDRALAZINE HYDROCHLORIDE 25 MG/1
25 TABLET, FILM COATED ORAL EVERY 8 HOURS SCHEDULED
Status: DISCONTINUED | OUTPATIENT
Start: 2018-08-12 | End: 2018-08-12 | Stop reason: HOSPADM

## 2018-08-12 RX ORDER — METOPROLOL TARTRATE 100 MG/1
100 TABLET ORAL EVERY 12 HOURS SCHEDULED
Qty: 60 TABLET | Refills: 1 | Status: SHIPPED | OUTPATIENT
Start: 2018-08-12 | End: 2018-08-12

## 2018-08-12 RX ORDER — METOPROLOL TARTRATE 100 MG/1
100 TABLET ORAL EVERY 12 HOURS SCHEDULED
Qty: 60 TABLET | Refills: 0 | Status: SHIPPED | OUTPATIENT
Start: 2018-08-12 | End: 2020-07-06 | Stop reason: ALTCHOICE

## 2018-08-12 RX ORDER — METHOCARBAMOL 500 MG/1
500 TABLET, FILM COATED ORAL EVERY 6 HOURS PRN
Qty: 12 TABLET | Refills: 0 | Status: SHIPPED | OUTPATIENT
Start: 2018-08-12 | End: 2019-03-04

## 2018-08-12 RX ADMIN — PANTOPRAZOLE SODIUM 20 MG: 20 TABLET, DELAYED RELEASE ORAL at 08:22

## 2018-08-12 RX ADMIN — KETOROLAC TROMETHAMINE 15 MG: 30 INJECTION, SOLUTION INTRAMUSCULAR at 05:22

## 2018-08-12 RX ADMIN — INSULIN LISPRO 3 UNITS: 100 INJECTION, SOLUTION INTRAVENOUS; SUBCUTANEOUS at 08:28

## 2018-08-12 RX ADMIN — HYDRALAZINE HYDROCHLORIDE 25 MG: 25 TABLET, FILM COATED ORAL at 13:28

## 2018-08-12 RX ADMIN — HYDROCHLOROTHIAZIDE 25 MG: 25 TABLET ORAL at 08:22

## 2018-08-12 RX ADMIN — AMLODIPINE BESYLATE 10 MG: 10 TABLET ORAL at 08:22

## 2018-08-12 RX ADMIN — ENOXAPARIN SODIUM 40 MG: 40 INJECTION SUBCUTANEOUS at 08:25

## 2018-08-12 RX ADMIN — ESCITALOPRAM OXALATE 5 MG: 10 TABLET ORAL at 08:22

## 2018-08-12 RX ADMIN — INSULIN LISPRO 20 UNITS: 100 INJECTION, SOLUTION INTRAVENOUS; SUBCUTANEOUS at 08:28

## 2018-08-12 RX ADMIN — LISINOPRIL 40 MG: 20 TABLET ORAL at 08:22

## 2018-08-12 RX ADMIN — OXYCODONE HYDROCHLORIDE 10 MG: 10 TABLET ORAL at 05:26

## 2018-08-12 RX ADMIN — METOPROLOL TARTRATE 100 MG: 100 TABLET ORAL at 08:22

## 2018-08-12 NOTE — ASSESSMENT & PLAN NOTE
· Patient's blood pressures have improved on the following regimen: amlodipine 10mg PO QD, HCTZ 25mg PO QD, metoprolol tartate 100mg PO BID, lisinopril 40mg PO QD and hydralazine 25mg PO TID     · Will also pursue secondary HTN work up:   · Renin-- pending  · Aldosterone-- pending  · Serum metanephrines-- pending   · Renal artery US-- pending  · She can follow up on these results with her PCP

## 2018-08-12 NOTE — ASSESSMENT & PLAN NOTE
· /100  Patient reports that she "often" has BPs that are 156 systolic, sometimes more  · It appears that previously patient was not taking her medications as directed (review of outpatient note in April); however, patient reports compliance with her medications  Her current BP meds include: amlodipine 10mg PO QD, HCTZ 25mg PO QD, metoprolol tartate 100mg PO QD, lisinopril 40mg PO QD   In setting of newly diagnosed AAA, would opt for better blood pressure control   · Will continue amlodipine at current dose, HCTZ at current dose, increase metoprolol to 100mg PO BID, continue lisinopril 40mg PO QD  · Will add hydralazine 25mg PO TID for additional control to attempt to obtain goal BP of 140s in setting of AAA  · Will also pursue secondary HTN work up:   · Renin-- pending  · Aldosterone-- pending  · Serum metanephrines-- pending   · Renal artery US-- pending

## 2018-08-12 NOTE — DISCHARGE SUMMARY
Discharge- Riki Lazo 1965, 47 y o  female MRN: [id number]    Unit/Bed#: -01 Encounter: [id number]    Primary Care Provider: Felecia Lu MD   Date and time admitted to hospital: 8/11/2018  2:55 PM    * Uncontrolled hypertension   Assessment & Plan    · Patient's blood pressures have improved on the following regimen: amlodipine 10mg PO QD, HCTZ 25mg PO QD, metoprolol tartate 100mg PO BID, lisinopril 40mg PO QD and hydralazine 25mg PO TID  · Will also pursue secondary HTN work up:   · Renin-- pending  · Aldosterone-- pending  · Serum metanephrines-- pending   · Renal artery US-- pending  · She can follow up on these results with her PCP        Chronic pain disorder   Assessment & Plan    · Patient has had ongoing back pain for the last 2 years   Patient reports that she typically works through the pain as best as she can, but sometimes it gets to be 'too much'  · She does not state that the pain is worse than usual, but she does state that her home oxy dose is not helping her pain   · She also has increased weakness in her LLE, some of which is related to pain-- this has improved today  · Will check MRI of full spine-- T spine and L spine prioritized-- these results are pending and she has requested that they be sent to her pain specialist as she has an appointment upcoming appointment on 8/15/18  · Pain control: toradol, robaxin, increase oxy to 10, morphine 2mg IV Q4 hr PRN breakthrough pain  · Has been using toradol and increased oxy 10 with improvement of pain; no IV morphine needed  · Would provide patient with Rx for Oxy 10 upon d/c with an Rx for Robaxin, though patient is not sure about her pain contract with [facility] and she will contact them prior to filling it   · Knowing this pain has been ongoing for 2 years, the hope is for reduction (not resolution) of pain prior to discharge for patient         Gastroesophageal reflux disease with esophagitis   Assessment & Plan    · Patient reports increased epigastric abdominal pain after having abdominal exam in the ED  · She does exhibit a tender epigastrum; she also reports GERD sx, but only takes omeprazole PRN and tries to use diet to control sx  · Increase PPI to BID dosing-- Rx was provided  · ED was initially concerned that epigastric abdominal pain was atypical ACS symptoms as patient has changes in EKG; review of EKG does not show significant abnormalities; her RALPH score is 1; there is low suspicion for ACS given that patient has has 2 negative troponins gurpreet span of 24 hours (if true ACS, would expect an upward trend by this point)          Hyperlipidemia associated with type 2 diabetes mellitus [facility])   Assessment & Plan    No results found for: HGBA1C    Recent Labs      08/11/18   2140  08/12/18   0719  08/12/18   1107  08/12/18   1559   POCGLU  246*  260*  228*  196*       Blood Sugar Average: Last 72 hrs:  (P) 226 8   Poorly controlled despite PO and insulin regimen  Continue home insulin schedule and resume PO meds  Continue statin        Thoracic aortic aneurysm without rupture ([facility] [address] )   Assessment & Plan    · Noted on CTA during last ED visit-- 4 3cmx4 1cm  · Patient will need outpatient surveillance        Abnormal urinalysis   Assessment & Plan    · Patient's initial UA was not the best specimen, but repeat UA does appear to have the same results  · Will opt to tx this as acute UTI-- Rx for Bactrim DS 1 tab PO bid x3 days              Discharging Physician / Practitioner: Niharika Mckeon PA-C  PCP: Jana Malik MD  Admission Date:   Admission Orders     Ordered        08/11/18 1643  Place in Observation (expected length of stay for this patient is less than two midnights)  Once             Discharge Date: 08/12/18    Resolved Problems  Date Reviewed: 8/12/2018          Resolved    Hypokalemia 8/12/2018     Resolved by  Niharika Mckeon PA-C          Consultations During Hospital Stay:  · none    Procedures Performed:   · VAS renal US  · MRI T and L spine    Significant Findings / Test Results:   · None-- results pending at discharge    Incidental Findings:   · hypokalemia    Test Results Pending at Discharge (will require follow up):   · MRI and renal artery US     Outpatient Tests Requested:  · none    Complications:  none    Reason for Admission: back pain, HTN    Hospital Course:     Fred Manriquez is a 47 y o  female patient who originally presented to the hospital on 8/11/2018 due to uncontrolled HTN and back pain  Patient reports chronic back pain that she could not tolerated prior to admission  She follows with pain management and feels that her narcotic doses are not as effective as they once were  She also reports that her BP has been very uncontrolled, reporting that she will often elevate to the 200s despite taking her medications  She was admitted to us for pain control and BP management  Her narcotics were increased and flexeril was d/c'd and robaxin was substituted  An MRI of her T and L spine were ordered to evaluate for progression of her known disease  Her BP medication regimen was reviewed and we felt it was necessary to work up a secondary cause of HTN  Labs were sent and a renal artery US was obtained  Her metoprolol was increased to BID dosing and hydralazine was added  On day of discharge, patient felt that her pain was more manageable and her BP had improved  Patient was eager to leave  She will follow up with her primary pain specialist and her PCP  She has filled out a release of records form so that her PCP and pain specialist can have access to her records  See above for discharge medications  Please see above list of diagnoses and related plan for additional information       Condition at Discharge: fair     Discharge Day Visit / Exam:     * Please refer to separate progress note for these details *    Discussion with Family: family aware of admission and plan    Discharge instructions/Information to patient and family:   See after visit summary for information provided to patient and family  Provisions for Follow-Up Care:  See after visit summary for information related to follow-up care and any pertinent home health orders  Disposition:     Home    For Discharges to [facility]:   · Not Applicable to this Patient - Not Applicable to this Patient    Planned Readmission: none     Discharge Statement:  I spent 35 minutes discharging the patient  This time was spent on the day of discharge  I had direct contact with the patient on the day of discharge  Greater than 50% of the total time was spent examining patient, answering all patient questions, arranging and discussing plan of care with patient as well as directly providing post-discharge instructions  Additional time then spent on discharge activities  Discharge Medications:  See after visit summary for reconciled discharge medications provided to patient and family        ** Please Note: This note has been constructed using a voice recognition system **

## 2018-08-12 NOTE — ASSESSMENT & PLAN NOTE
· Patient's initial UA was not the best specimen, but repeat UA does appear to have the same results  · Will opt to tx this as acute UTI-- Rx for Bactrim DS 1 tab PO bid x3 days

## 2018-08-12 NOTE — ASSESSMENT & PLAN NOTE
· Patient has had ongoing back pain for the last 2 years   Patient reports that she typically works through the pain as best as she can, but sometimes it gets to be 'too much'  · She does not state that the pain is worse than usual, but she does state that her home oxy dose is not helping her pain   · She also has increased weakness in her LLE, some of which is related to pain-- this has improved today  · Will check MRI of full spine-- T spine and L spine prioritized  · Pain control: toradol, robaxin, increase oxy to 10, morphine 2mg IV Q4 hr PRN breakthrough pain  · Has been using toradol and increased oxy 10 with improvement of pain; no IV morphine needed  · Would provide patient with Rx for Oxy 10 upon d/c, though patient is not sure about her pain contract with [facility]  · Knowing this pain has been ongoing for 2 years, the hope is for reduction (not resolution) of pain prior to discharge for patient

## 2018-08-12 NOTE — CASE MANAGEMENT
Thank you,  Stiven Aqq  [facility] Utilization Review Department  Phone: [phone]; Fax [phone]  ATTENTION: The Network Utilization Review Department is now centralized for our 9 Facilities  Make a note that we have a new phone and fax numbers for our Department  Please call with any questions or concerns to [phone] and carefully follow the prompts so that you are directed to the right person  All voicemails are confidential  Fax any determinations, approvals, denials, and requests for initial or continue stay review clinical to [phone]  Due to HIGH CALL volume, it would be easier if you could please send faxed requests to expedite your requests and in part, help us provide discharge notifications faster  Initial Clinical Review    Admission: Date/Time/Statement: OBSERVATION ADMISSION 08/11/2018@ 1643    Orders Placed This Encounter   Procedures    Place in Observation (expected length of stay for this patient is less than two midnights)     Standing Status:   Standing     Number of Occurrences:   1     Order Specific Question:   Admitting Physician     Answer:   Khushi Chandra [[id number]]     Order Specific Question:   Level of Care     Answer:   Med Surg [16]         ED: Date/Time/Mode of Arrival:   ED Arrival Information     Expected Arrival Acuity Means of Arrival Escorted By Service Admission Type    - 8/11/2018 14:18 Urgent Walk-In Self General Medicine Urgent    Arrival Complaint    Back Pain          Chief Complaint:   Chief Complaint   Patient presents with    Back Pain     Here last night, found AAA and wanted to admit  left AMA  History of Illness: 47 y o  female presents to the ED for back pain/hand pain/foot pain  She reports that her back pain started in her back and radiated down into her L leg  She stated the pain was shooting/sharp in nature with radiation down her whole left leg   Patient mostly reports weakness 2/2 pain-- stating that once she feels pain she doesn't want to move  ED Vital Signs:   ED Triage Vitals [08/11/18 1424]   Temperature Pulse Respirations Blood Pressure SpO2   98 1 °F (36 7 °C) 96 16 170/85 99 %      Temp Source Heart Rate Source Patient Position - Orthostatic VS BP Location FiO2 (%)   Oral Monitor Sitting Left arm --      Pain Score       8        Wt Readings from Last 1 Encounters:   08/11/18 87 5 kg (192 lb 14 4 oz)       Vital Signs (abnormal): 08/11: BP: 180/100    Abnormal Labs/Diagnostic Test Results: 08/11 potassium 3 2, glucose 215, albumin 3 3, wbc 11 67, urinalysis:  Leukocytes, UA Large     Blood, UA Trace-Intact     Glucose 270,  CTA dissection chest/abd1  [place] aneurysmal ascending thoracic aorta measuring 4 3 x 4 1 cm   Severe atherosclerotic plaque in a focal segment of the splenic artery     2   Scattered lung bullae and emphysematous changes  3   Enlarged nonspecific right hilar lymph node  4   Hepatic steatosis and hepatomegaly  ED Treatment:   Medication Administration from 08/11/2018 1418 to 08/11/2018 1831       Date/Time Order Dose Route Action Action by Comments     08/11/2018 1640 morphine (PF) 4 mg/mL injection 4 mg 4 mg Intravenous Given Stepan Plascencia RN      08/11/2018 1638 aspirin chewable tablet 324 mg 324 mg Oral Given Stepan Plascencia RN      08/11/2018 1825 potassium chloride (K-DUR,KLOR-CON) CR tablet 20 mEq 20 mEq Oral Given Stepan Plascencia RN           Past Medical/Surgical History:    Active Ambulatory Problems     Diagnosis Date Noted    Anxiety 07/26/2017    Cardiac murmur 12/15/2017    Carpal tunnel syndrome of left wrist 06/26/2015    Change in bowel habit 04/24/2017    Chronic pain disorder 06/05/2017    Cough 02/15/2016    Depression 07/26/2017    Diabetes ([facility] [address] ) 07/26/2017    Diabetic peripheral neuropathy ([facility] [address] ) 02/25/2015    Dizziness 01/26/2016    Uncontrolled hypertension 02/25/2015    Fibromyalgia 07/26/2017    Gastroesophageal reflux disease with esophagitis 01/26/2016  Hemangioma of bone 07/30/2015    [facility] with [facility] 05/06/2015    Hypertension 07/26/2017    Hypoestrogenism 07/26/2017    Large adrenal gland ([facility] ) 07/30/2015    Low back pain 02/25/2015    Lumbar radiculopathy, chronic 02/25/2015    Medically complex patient 06/05/2017    Neuropathy 07/26/2017    Noncompliance 01/27/2016    Noncompliance with diet and medication regimen 05/06/2015    Overweight 02/25/2015    Pancreatitis 07/23/2015    Primary insomnia 12/15/2017    Right-sided thoracic back pain 07/23/2015    Sciatica of left side 02/25/2015    Screening for colon cancer 12/05/2016    Shingles 03/25/2015    Thoracic radiculitis 07/23/2015    Tobacco abuse 06/26/2015    Uncontrolled type 2 diabetes mellitus with complication, with long-term current use of insulin ([place] [id number] ) 06/09/2015    Vertebral body hemangioma 08/21/2015    Vertigo 01/26/2016    Vaginal discharge 04/12/2018    Yeast vaginitis 04/12/2018    Recurrent vaginitis 04/12/2018     Resolved Ambulatory Problems     Diagnosis Date Noted    No Resolved Ambulatory Problems     Past Medical History:   Diagnosis Date    Arthritis     Diabetes mellitus ([facility] )     Fibromyalgia     GERD (gastroesophageal reflux disease)     Hyperlipidemia     Hypertension     Psychiatric disorder        Admitting Diagnosis: Back pain [M54 9]  Epigastric pain [R10 13]  EKG abnormality [R94 31]    Age/Sex: 47 y o  female    Assessment/Plan:   Uncontrolled hypertension   Assessment & Plan     · /100  Patient reports that she "often" has BPs that are 928 systolic, sometimes more  · Her current BP meds include: amlodipine 10mg PO QD, HCTZ 25mg PO QD, metoprolol tartate 100mg PO QD, lisinopril 40mg PO QD  In setting of newly diagnosed AAA, would opt for better blood pressure control   ?  Will continue amlodipine at current dose, HCTZ at current dose, increase metoprolol to 100mg PO BID, continue lisinopril 40mg PO QD  ? Will also pursue secondary HTN work up:   § Renin  § Aldosterone  § Serum metanephrines   § Renal artery US           Chronic pain disorder   Assessment & Plan     · Patient has had ongoing back pain for the last 2 years   Patient reports that she typically works through the pain as best as she can, but sometimes it gets to be 'too much'  · She does not state that the pain is worse than usual, but she does state that her home oxy dose is not helping her pain   · She also has increased weakness in her LLE, some of which is related to pain  · Will check MRI of full spine  · Pain control: toradol, robaxin, increase oxy to 10, morphine 2mg IV Q4 hr PRN breakthrough pain  · Knowing this pain has been ongoing for 2 years, the hope is for reduction (not resolution) of pain prior to discharge for patient           Gastroesophageal reflux disease with esophagitis   Assessment & Plan     · Patient reports increased epigastric abdominal pain after having abdominal exam in the ED  · She does exhibit a tender epigastrum; she also reports GERD sx, but only takes omeprazole PRN and tries to use diet to control sx  · Increase PPI to BID dosing  · ED was initially concerned that epigastric abdominal pain was atypical ACS symptoms as patient has changes in EKG; review of EKG does not show significant abnormalities; her RALPH score is 1; there is low suspicion for ACS given that patient has has 2 negative troponins gurpreet span of 24 hours (if true ACS, would expect an upward trend by this point)  · Will place on telemetry and trend troponins           Hypokalemia   Assessment & Plan     · K 3 2 on admission, suspect related to HCTZ use; repleted in ED with K Dur 20 mEq x1  · Give an additional 40 mEq PO now  · Repeat BMP in AM          Hyperlipidemia associated with type 2 diabetes mellitus ([facility] [address] )   Assessment & Plan     No results found for: HGBA1C         Recent Labs      08/11/18   1850   POCGLU  204*         Blood Sugar Average: Last 72 hrs:  (P) 204   Poorly controlled despite PO and insulin regimen  Continue home insulin schedule + SSI for correction  Continue statin          Abnormal urinalysis   Assessment & Plan     · Patient's US is not the best specimen (moderate epithelial cells)- glucose/blood/lrg leuks/30-50 WBC/innum bacteria noted; no urinary complaints  · Will repeat UA to ensure poor specimen vs  Acute UTI         Admission Orders:  Scheduled Meds:   Current Facility-Administered Medications:  acetaminophen 650 mg Oral Q6H PRN   amLODIPine 10 mg Oral Daily   calcium carbonate 1,000 mg Oral Daily PRN   [START ON 8/14/2018] Dulaglutide 1 5 mg Subcutaneous Weekly   enoxaparin 40 mg Subcutaneous Daily   escitalopram 5 mg Oral Daily   hydrochlorothiazide 25 mg Oral Daily   insulin glargine 60 Units Subcutaneous HS   insulin lispro 1-6 Units Subcutaneous TID AC   insulin lispro 1-6 Units Subcutaneous HS   insulin lispro 20 Units Subcutaneous Daily With Breakfast   insulin lispro 20 Units Subcutaneous Daily With Lunch   insulin lispro 20 Units Subcutaneous Daily With Dinner   ketorolac 15 mg Intravenous Q6H [facility]   lisinopril 40 mg Oral Daily   methocarbamol 500 mg Oral Q6H PRN   metoprolol tartrate 100 mg Oral Q12H CEFERINO   morphine injection 2 mg Intravenous Q4H PRN   nicotine 1 patch Transdermal Daily   ondansetron 4 mg Intravenous Q6H PRN   oxyCODONE 10 mg Oral Q4H PRN   pantoprazole 20 mg Oral BID AC   pravastatin 20 mg Oral Daily With Dinner   senna 1 tablet Oral Daily     Continuous Infusions:    PRN Meds:   acetaminophen    calcium carbonate    methocarbamol    morphine injection    ondansetron    oxyCODONE x1 PO  48 hr tele  12 lead ecg  Peripheral IV  MRI inpt  Fingerstick gluc hs & bef meals  VAS renal artery complete

## 2018-08-12 NOTE — ASSESSMENT & PLAN NOTE
· Patient reports increased epigastric abdominal pain after having abdominal exam in the ED  · She does exhibit a tender epigastrum; she also reports GERD sx, but only takes omeprazole PRN and tries to use diet to control sx  · Increase PPI to BID dosing  · ED was initially concerned that epigastric abdominal pain was atypical ACS symptoms as patient has changes in EKG; review of EKG does not show significant abnormalities; her RALPH score is 1; there is low suspicion for ACS given that patient has has 2 negative troponins gurpreet span of 24 hours (if true ACS, would expect an upward trend by this point)  · Will place on telemetry and trend troponins -- no events and negative trops  · D/c telemetry

## 2018-08-12 NOTE — ASSESSMENT & PLAN NOTE
· K 3 2 on admission, suspect related to HCTZ use; repleted in ED with K Dur 20 mEq x1 and 40mEq when she came on the floor   · Improved to 3 6

## 2018-08-12 NOTE — ASSESSMENT & PLAN NOTE
No results found for: HGBA1C    Recent Labs      08/11/18   1850  08/11/18   2140  08/12/18   0719  08/12/18   1107   POCGLU  204*  246*  260*  228*       Blood Sugar Average: Last 72 hrs:  (P) 234 5   Poorly controlled despite PO and insulin regimen  Continue home insulin schedule + SSI for correction  Continue statin

## 2018-08-12 NOTE — ASSESSMENT & PLAN NOTE
· Patient reports increased epigastric abdominal pain after having abdominal exam in the ED  · She does exhibit a tender epigastrum; she also reports GERD sx, but only takes omeprazole PRN and tries to use diet to control sx  · Increase PPI to BID dosing-- Rx was provided  · ED was initially concerned that epigastric abdominal pain was atypical ACS symptoms as patient has changes in EKG; review of EKG does not show significant abnormalities; her RALPH score is 1; there is low suspicion for ACS given that patient has has 2 negative troponins gurpreet span of 24 hours (if true ACS, would expect an upward trend by this point)

## 2018-08-12 NOTE — PROGRESS NOTES
Progress Note - Lluvia Curiel 1965, 47 y o  female MRN: [id number]    Unit/Bed#: -01 Encounter: [id number]    Primary Care Provider: Kevin Becerril MD   Date and time admitted to hospital: 8/11/2018  2:55 PM    * Uncontrolled hypertension   Assessment & Plan    · /100  Patient reports that she "often" has BPs that are 294 systolic, sometimes more  · It appears that previously patient was not taking her medications as directed (review of outpatient note in April); however, patient reports compliance with her medications  Her current BP meds include: amlodipine 10mg PO QD, HCTZ 25mg PO QD, metoprolol tartate 100mg PO QD, lisinopril 40mg PO QD  In setting of newly diagnosed AAA, would opt for better blood pressure control   · Will continue amlodipine at current dose, HCTZ at current dose, increase metoprolol to 100mg PO BID, continue lisinopril 40mg PO QD  · Will add hydralazine 25mg PO TID for additional control to attempt to obtain goal BP of 140s in setting of AAA  · Will also pursue secondary HTN work up:   · Renin-- pending  · Aldosterone-- pending  · Serum metanephrines-- pending   · Renal artery US-- pending        Chronic pain disorder   Assessment & Plan    · Patient has had ongoing back pain for the last 2 years   Patient reports that she typically works through the pain as best as she can, but sometimes it gets to be 'too much'  · She does not state that the pain is worse than usual, but she does state that her home oxy dose is not helping her pain   · She also has increased weakness in her LLE, some of which is related to pain-- this has improved today  · Will check MRI of full spine-- T spine and L spine prioritized  · Pain control: toradol, robaxin, increase oxy to 10, morphine 2mg IV Q4 hr PRN breakthrough pain  · Has been using toradol and increased oxy 10 with improvement of pain; no IV morphine needed  · Would provide patient with Rx for Oxy 10 upon d/c, though patient is not sure about her pain contract with [facility]  · Knowing this pain has been ongoing for 2 years, the hope is for reduction (not resolution) of pain prior to discharge for patient         Gastroesophageal reflux disease with esophagitis   Assessment & Plan    · Patient reports increased epigastric abdominal pain after having abdominal exam in the ED  · She does exhibit a tender epigastrum; she also reports GERD sx, but only takes omeprazole PRN and tries to use diet to control sx  · Increase PPI to BID dosing  · ED was initially concerned that epigastric abdominal pain was atypical ACS symptoms as patient has changes in EKG; review of EKG does not show significant abnormalities; her RALPH score is 1; there is low suspicion for ACS given that patient has has 2 negative troponins gurpreet span of 24 hours (if true ACS, would expect an upward trend by this point)  · Will place on telemetry and trend troponins -- no events and negative trops  · D/c telemetry        Hypokalemia   Assessment & Plan    · K 3 2 on admission, suspect related to HCTZ use; repleted in ED with K Dur 20 mEq x1 and 40mEq when she came on the floor   · Improved to 3 6        Hyperlipidemia associated with type 2 diabetes mellitus ([facility] [address] )   Assessment & Plan    No results found for: HGBA1C    Recent Labs      08/11/18   1850  08/11/18   2140  08/12/18   0719  08/12/18   1107   POCGLU  204*  246*  260*  228*       Blood Sugar Average: Last 72 hrs:  (P) 234 5   Poorly controlled despite PO and insulin regimen  Continue home insulin schedule + SSI for correction  Continue statin        Abnormal urinalysis   Assessment & Plan    · Patient's US is not the best specimen (moderate epithelial cells)- glucose/blood/lrg leuks/30-50 WBC/innum bacteria noted; no urinary complaints  · Will repeat UA to ensure poor specimen vs  Acute UTI          VTE Pharmacologic Prophylaxis:   Pharmacologic: Enoxaparin (Lovenox)  Mechanical VTE Prophylaxis in Place: Yes    Patient Centered Rounds: I have performed bedside rounds with nursing staff today  Discussions with Specialists or Other Care Team Provider: case management    Education and Discussions with Family / Patient: patient    Time Spent for Care: 30 minutes  More than 50% of total time spent on counseling and coordination of care as described above  Current Length of Stay: 0 day(s)    Current Patient Status: Observation   Certification Statement: The patient will continue to require additional inpatient hospital stay due to management of elevated BP, secondary hypertension work up     Discharge Plan: d/c home when medically stable     Code Status: Level 1 - Full Code      Subjective:   Patient reports improvement of pain  She is able to move her L leg more easily and with less pain  She feels that the increased dose of oxycodone has helped  She is still having some GI upset, which now that she has increased Oxy she feels that this is causing her increased sx  Objective:     Vitals:   Temp (24hrs), Av °F (36 7 °C), Min:97 9 °F (36 6 °C), Max:98 1 °F (36 7 °C)    HR:  [73-96] 73  Resp:  [16-18] 18  BP: (147-180)/() 161/89  SpO2:  [94 %-99 %] 96 %  Body mass index is 27 68 kg/m²  Input and Output Summary (last 24 hours):     No intake or output data in the 24 hours ending 18 1148    Physical Exam:     Physical Exam   Constitutional: She is oriented to person, place, and time  No distress  HENT:   Head: Normocephalic and atraumatic  Mouth/Throat: No oropharyngeal exudate  Eyes: Conjunctivae and EOM are normal  Pupils are equal, round, and reactive to light  No scleral icterus  Neck: No JVD present  Cardiovascular: Normal rate and regular rhythm  Exam reveals no gallop and no friction rub  No murmur heard  Pulmonary/Chest: Effort normal and breath sounds normal  No respiratory distress  She has no wheezes  She has no rales  Abdominal: Soft  Bowel sounds are normal  She exhibits no distension   There is no tenderness  There is no rebound  Musculoskeletal: She exhibits no edema or tenderness  Neurological: She is alert and oriented to person, place, and time  She displays normal reflexes  No cranial nerve deficit  She exhibits abnormal muscle tone (still with some LLE weakness, but improved compared to yesterday)  Skin: Skin is warm and dry  No rash noted  She is not diaphoretic  No erythema  Psychiatric: She has a normal mood and affect  Her behavior is normal        Additional Data:     Labs:      Results from last 7 days  Lab Units 08/12/18  0712 08/11/18  1543   WBC Thousand/uL 9 02 11 67*   HEMOGLOBIN g/dL 13 0 13 7   HEMATOCRIT % 39 4 41 0   PLATELETS Thousands/uL 276 296   NEUTROS PCT %  --  70   LYMPHS PCT %  --  23   MONOS PCT %  --  6   EOS PCT %  --  1       Results from last 7 days  Lab Units 08/12/18  0712 08/11/18  1543   SODIUM mmol/L 138 141   POTASSIUM mmol/L 3 6 3 2*   CHLORIDE mmol/L 103 102   CO2 mmol/L 27 31   BUN mg/dL 12 8   CREATININE mg/dL 0 65 0 70   CALCIUM mg/dL 8 6 9 0   TOTAL PROTEIN g/dL  --  7 2   BILIRUBIN TOTAL mg/dL  --  0 40   ALK PHOS U/L  --  105   ALT U/L  --  33   AST U/L  --  18   GLUCOSE RANDOM mg/dL 270* 215*           Results from last 7 days  Lab Units 08/12/18  1107 08/12/18  0719 08/11/18  2140 08/11/18  1850   POC GLUCOSE mg/dl 228* 260* 246* 204*             * I Have Reviewed All Lab Data Listed Above  * Additional Pertinent Lab Tests Reviewed: [facility] Admission Reviewed    Imaging:    Imaging Reports Reviewed Today Include: CTA dissection protocol  Imaging Personally Reviewed by Myself Includes:  CTA dissection protocol     Recent Cultures (last 7 days):       Results from last 7 days  Lab Units 08/10/18  2105   URINE CULTURE  Culture results to follow         Last 24 Hours Medication List:     Current Facility-Administered Medications:  acetaminophen 650 mg Oral Q6H PRN Madelyn Wiggins PA-C   amLODIPine 10 mg Oral Daily Madelyn ENCINAS DONALD Wiggins   calcium carbonate 1,000 mg Oral Daily PRN Madelyn Wiggins PA-C   [START ON 8/14/2018] Dulaglutide 1 5 mg Subcutaneous Weekly Madelyn Wiggins PA-C   enoxaparin 40 mg Subcutaneous Daily Madelyn Wiggins, DONALD   escitalopram 5 mg Oral Daily Madelyn Wiggins, DONALD   hydrALAZINE 25 mg Oral Q8H [address] Madelyn Wiggins, DONALD   hydrochlorothiazide 25 mg Oral Daily Madelyn Wiggins PA-C   insulin glargine 60 Units Subcutaneous HS Madelyn Wiggins, DONALD   insulin lispro 1-6 Units Subcutaneous TID AC Madelyn Wiggins, DONALD   insulin lispro 1-6 Units Subcutaneous HS Madelyn Wiggins, DONALD   insulin lispro 20 Units Subcutaneous Daily With Breakfast Madelyn Wiggins, DONALD   insulin lispro 20 Units Subcutaneous Daily With Lunch Madelyn Wiggins, DONALD   insulin lispro 20 Units Subcutaneous Daily With Dinner Madelyn Wiggins PA-C   ketorolac 15 mg Intravenous Q6H [address] Madelyn Wiggins PA-C   lisinopril 40 mg Oral Daily Madelyn Wiggins PA-C   methocarbamol 500 mg Oral Q6H PRN Madelyn Wiggins PA-C   metoprolol tartrate 100 mg Oral Q12H [address] Madelyn Wiggins PA-C   morphine injection 2 mg Intravenous Q4H PRN Madelyn Wiggins PA-C   nicotine 1 patch Transdermal Daily Madelyn Wiggins PA-C   ondansetron 4 mg Intravenous Q6H PRN Madelyn Wiggins PA-C   oxyCODONE 10 mg Oral Q4H PRN Madelyn Wiggins PA-C   pantoprazole 20 mg Oral BID AC Madelyn Wiggins PA-C   pravastatin 20 mg Oral Daily With [employer] [facility] Wiggins, DONALD   senna 1 tablet Oral Daily Madelyn Wiggins PA-C        Today, Patient Was Seen By: Jeff Yadav PA-C    ** Please Note: Dictation voice to text software may have been used in the creation of this document   **

## 2018-08-12 NOTE — ASSESSMENT & PLAN NOTE
No results found for: HGBA1C    Recent Labs      08/11/18   2140  08/12/18   0719  08/12/18   1107  08/12/18   1559   POCGLU  246*  260*  228*  196*       Blood Sugar Average: Last 72 hrs:  (P) 226 8   Poorly controlled despite PO and insulin regimen  Continue home insulin schedule and resume PO meds  Continue statin

## 2018-08-13 LAB — BACTERIA UR CULT: ABNORMAL

## 2018-08-13 NOTE — PROGRESS NOTES
I left a message on the patient's machine to call for the results    She was not treated with an antibiotic for her urinary tract infection

## 2018-08-15 LAB
ALDOST SERPL-MCNC: 2.6 NG/DL (ref 0–30)
METANEPH FREE SERPL-MCNC: 31 PG/ML (ref 0–62)
NORMETANEPHRINE SERPL-MCNC: 45 PG/ML (ref 0–145)

## 2018-08-16 LAB — RENIN PLAS-CCNC: <0.167 NG/ML/HR (ref 0.17–5.38)

## 2018-09-07 NOTE — ASSESSMENT & PLAN NOTE
· Patient has had ongoing back pain for the last 2 years   Patient reports that she typically works through the pain as best as she can, but sometimes it gets to be 'too much'  · She does not state that the pain is worse than usual, but she does state that her home oxy dose is not helping her pain   · She also has increased weakness in her LLE, some of which is related to pain-- this has improved today  · Will check MRI of full spine-- T spine and L spine prioritized-- these results are pending and she has requested that they be sent to her pain specialist as she has an appointment upcoming appointment on 8/15/18  · Pain control: toradol, robaxin, increase oxy to 10, morphine 2mg IV Q4 hr PRN breakthrough pain  · Has been using toradol and increased oxy 10 with improvement of pain; no IV morphine needed  · Would provide patient with Rx for Oxy 10 upon d/c with an Rx for Robaxin, though patient is not sure about her pain contract with [facility] and she will contact them prior to filling it   · Knowing this pain has been ongoing for 2 years, the hope is for reduction (not resolution) of pain prior to discharge for patient I will SWITCH the dose or number of times a day I take the medications listed below when I get home from the hospital:  None

## 2018-09-10 ENCOUNTER — TELEPHONE (OUTPATIENT)
Dept: NEUROLOGY | Facility: CLINIC | Age: 53
End: 2018-09-10

## 2018-09-11 ENCOUNTER — TELEPHONE (OUTPATIENT)
Dept: NEUROLOGY | Facility: CLINIC | Age: 53
End: 2018-09-11

## 2018-09-25 ENCOUNTER — TRANSCRIBE ORDERS (OUTPATIENT)
Dept: NEUROLOGY | Facility: CLINIC | Age: 53
End: 2018-09-25

## 2018-09-25 DIAGNOSIS — M79.605 BILATERAL LEG PAIN: Primary | ICD-10-CM

## 2018-09-25 DIAGNOSIS — M79.604 BILATERAL LEG PAIN: Primary | ICD-10-CM

## 2018-10-04 ENCOUNTER — OFFICE VISIT (OUTPATIENT)
Dept: NEUROLOGY | Facility: CLINIC | Age: 53
End: 2018-10-04
Payer: COMMERCIAL

## 2018-10-04 VITALS
BODY MASS INDEX: 27.63 KG/M2 | RESPIRATION RATE: 18 BRPM | SYSTOLIC BLOOD PRESSURE: 148 MMHG | DIASTOLIC BLOOD PRESSURE: 90 MMHG | HEART RATE: 93 BPM | WEIGHT: 193 LBS | HEIGHT: 70 IN

## 2018-10-04 DIAGNOSIS — M54.16 LUMBAR RADICULOPATHY, CHRONIC: ICD-10-CM

## 2018-10-04 DIAGNOSIS — M54.32 SCIATICA OF LEFT SIDE: Primary | ICD-10-CM

## 2018-10-04 DIAGNOSIS — M79.604 BILATERAL LEG PAIN: ICD-10-CM

## 2018-10-04 DIAGNOSIS — M79.605 BILATERAL LEG PAIN: ICD-10-CM

## 2018-10-04 PROCEDURE — 99244 OFF/OP CNSLTJ NEW/EST MOD 40: CPT | Performed by: PSYCHIATRY & NEUROLOGY

## 2018-10-04 RX ORDER — OXYCODONE HYDROCHLORIDE AND ACETAMINOPHEN 5; 325 MG/1; MG/1
1 TABLET ORAL EVERY 4 HOURS PRN
COMMUNITY
End: 2020-06-15

## 2018-10-04 RX ORDER — PREGABALIN 75 MG/1
75 CAPSULE ORAL 3 TIMES DAILY
COMMUNITY
End: 2020-04-13

## 2018-10-04 NOTE — LETTER
October 4, 2018     Beltran Busch MD  [address] [address]    Patient: Alvaro Castrejon   YOB: 1965   Date of Visit: 10/4/2018       Dear Dr Ulysses Stafford: Thank you for referring [place] to me for evaluation  Below are my notes for this consultation  If you have questions, please do not hesitate to call me  I look forward to following your patient along with you  Sincerely,        Asaf Honeycutt MD        CC: MD Asaf Machado MD  10/4/2018 12:03 PM  Sign at close encounter  Patient ID: Alvaro Castrejon is a 47 y o  female  Assessment/Plan:    Lumbar radiculopathy, chronic  Mrs Red has chronic lower back pain radiating to the left lower extremity for almost 3 years now and has been followed closely with pain management  She has had multiple lower back injections without any relief  Clinically her strength testing was very limited today, because of her significant pain, however I did not appreciate any atrophy in the left lower extremity  She does have hyperesthesia  to pain and temperature in a patchy distribution, consistent with her chronic pain  Her lumbar spine MRI only showed mild to moderate degenerative disease but there was no significant neuroforaminal narrowing or lumbar spinal stenosis to be considered for a surgical opinion  I will proceed with electrodiagnostic studies to rule out any neurogenic changes  She is already on neuropathic pain medications at Lyrica 75 mg 3 times a day along with oxycodone, she uses topical patches as well as has Robaxin  I have recommended that she follows with her pain management physician and discuss other interventional options  Unfortunately, I cannot offer her anything more than the medications, which she has already been on  I will touch base with her after her EMG studies, and will see her on an as-needed basis    She has my contact information for any questions or concerns  Besides, she has h/o DM, had very minimal acral loss on right foot, ? Neuropathy, will assess via EMG  Diagnoses and all orders for this visit:    Sciatica of left side  -     EMG 2 limb lower extremity; Future    Bilateral leg pain  -     Ambulatory referral to Neurology    Lumbar radiculopathy, chronic    Thank you for allowing me to participate in her care  Subjective:    HPI    I had the pleasure of seeing your patient in Neurology Clinic today  As you know, she is a 59-year-old woman who was referred for evaluation for neuropathy  Please allow me to summarize her history for the record  As you know,She has h/o lower back pain for the last 2 years  The pain is mainly in the lower back, more so on the left side, and radiates to the left lower extremity  The pain starts from back, to the left buttocks, left posterior thigh, calf and the whole left foot  She does get paresthesias in the left leg, not so much in the right leg, except rare paresthesias in the right foot  She does not think she is weak in the leg, but the pain limits her to do things with the left leg  No balance issues, no tripping, leg does not give out on her  It is harder for her to take a flight of steps  She gets muscle spasms in the leg and lower back, takes robaxin, lyrica (75mg tid) and percocet as needed  She has had multiple (18) injections in the lower back, which have helped, but only for short periods  She has tried physical therapy twice, which made her pain worse  She does have history of diabetes for 8 years, not controlled  HbA1c was 9 1 last week  Has HTN, was recently hospitalized for hypertensive urgency  She had MRI of the thoracic and lumbar spine in August 2018 as a part of her recent hospitalization  I personally reviewed her images  MRI of the lumbar spine showed multilevel degenerative spondylosis without any significant canal stenosis    No significant neuroforaminal narrowing was seen   MRI of the thoracic spine showed minimal multilevel disc degeneration without any spinal canal or neuroforaminal  Stenosis  She has been recently diagnosed with aortic aneurysm in the thoracic aorta measuring 4 3 X 4 1 cm  The following portions of the patient's history were reviewed and updated as appropriate: allergies, current medications, past family history, past medical history, past social history, past surgical history and problem list        Objective:    Blood pressure 148/90, pulse 93, resp  rate 18, height 5' 10" (1 778 m), weight 87 5 kg (193 lb)  Physical Exam  General exam: Pt was awake, alert and oriented  HEENT: atraumatic, normocephalic  Normal oral mucosa, neck was supple, no lymphadenopathy  Normal peripheral pulses, heart sounds were normal  Chest was clear  Extremities did not show any edema or cyanosis  Neurological Exam  Neurologically, pt was awake and alert  Speech was normal, no dysarthria or aphasia  Cranial nerve exam showed normal extraocular movements, no nystagmus or diplopia  There was no ptosis at baseline or with sustained upward gaze  Strength of eye closure muscles was normal   Facial sensations were normal bilaterally  No facial weakness, able to blow out the cheeks and push the tongue in the cheeks well  No tongue atrophy or fasciculations  Motor exam revealed normal tone and muscle bulk  There was no atrophy, scapular winging,high arches, hammertoes, shortening of achilles tendons or any other features of neuromuscular disease  Muscle strength was normal in neck flexors and extensors, and all muscle groups in both upper extremities,  Muscle strength was full in all muscle groups in the right lower extremity, left lower extremity muscle stress strength testing was limited by her effort due to the pain however she was able to give me full effort in almost all the muscles except left hip flexors which was limited due to her pain    Reflexes were graded as 2 in the upper extremities, decreased in the lowers  Toes were downgoing  There was no exaggerated jaw jerk or tijerina's sign  No ankle clonus  Sensory examination revealed hyperesthesia to pinprick and temperature in a patchy distribution in the left lower extremity, vibration was mildly reduced at the toes on the right and pinprick was reduced up to mid foot on the right  Gait was slow and cautious, antalgic  ROS:  I reviewed the below ROS and what is mentioned in HPI, the remainder of ROS was negative  Review of Systems   Constitutional: Positive for appetite change  Negative for fever  HENT: Positive for congestion  Negative for hearing loss, tinnitus, trouble swallowing and voice change  Eyes: Negative  Negative for photophobia and pain  Blurred vision   Respiratory: Positive for cough  Negative for shortness of breath  Cardiovascular: Negative  Negative for palpitations  Gastrointestinal: Negative  Negative for nausea and vomiting  Endocrine: Negative  Negative for cold intolerance and heat intolerance  Genitourinary: Negative  Negative for dysuria, frequency and urgency  Musculoskeletal: Positive for back pain  Negative for myalgias and neck pain  Pain while walking, Muscle pain   Skin: Negative  Negative for rash  Neurological: Positive for dizziness and numbness  Negative for tremors, seizures, syncope, facial asymmetry, speech difficulty, weakness, light-headedness and headaches  Difficulty walking, Twitching   Hematological: Bruises/bleeds easily (easy bruising)  Psychiatric/Behavioral: Positive for dysphoric mood and sleep disturbance  Negative for confusion and hallucinations  The patient is nervous/anxious           Memory problems, Depression

## 2018-10-04 NOTE — ASSESSMENT & PLAN NOTE
Mrs Rusty Walls has chronic lower back pain radiating to the left lower extremity for almost 3 years now and has been followed closely with pain management  She has had multiple lower back injections without any relief  Clinically her strength testing was very limited today, because of her significant pain, however I did not appreciate any atrophy in the left lower extremity  She does have hyperesthesia  to pain and temperature in a patchy distribution, consistent with her chronic pain  Her lumbar spine MRI only showed mild to moderate degenerative disease but there was no significant neuroforaminal narrowing or lumbar spinal stenosis to be considered for a surgical opinion  I will proceed with electrodiagnostic studies to rule out any neurogenic changes  She is already on neuropathic pain medications at Lyrica 75 mg 3 times a day along with oxycodone, she uses topical patches as well as has Robaxin  I have recommended that she follows with her pain management physician and discuss other interventional options  Unfortunately, I cannot offer her anything more than the medications, which she has already been on  I will touch base with her after her EMG studies, and will see her on an as-needed basis  She has my contact information for any questions or concerns

## 2018-10-04 NOTE — PROGRESS NOTES
Patient ID: Anne Charles is a 47 y o  female  Assessment/Plan:    Lumbar radiculopathy, chronic  Mrs Red has chronic lower back pain radiating to the left lower extremity for almost 3 years now and has been followed closely with pain management  She has had multiple lower back injections without any relief  Clinically her strength testing was very limited today, because of her significant pain, however I did not appreciate any atrophy in the left lower extremity  She does have hyperesthesia  to pain and temperature in a patchy distribution, consistent with her chronic pain  Her lumbar spine MRI only showed mild to moderate degenerative disease but there was no significant neuroforaminal narrowing or lumbar spinal stenosis to be considered for a surgical opinion  I will proceed with electrodiagnostic studies to rule out any neurogenic changes  She is already on neuropathic pain medications at Lyrica 75 mg 3 times a day along with oxycodone, she uses topical patches as well as has Robaxin  I have recommended that she follows with her pain management physician and discuss other interventional options  Unfortunately, I cannot offer her anything more than the medications, which she has already been on  I will touch base with her after her EMG studies, and will see her on an as-needed basis  She has my contact information for any questions or concerns  Besides, she has h/o DM, had very minimal acral loss on right foot, ? Neuropathy, will assess via EMG  Diagnoses and all orders for this visit:    Sciatica of left side  -     EMG 2 limb lower extremity; Future    Bilateral leg pain  -     Ambulatory referral to Neurology    Lumbar radiculopathy, chronic    Thank you for allowing me to participate in her care  Subjective:    HPI    I had the pleasure of seeing your patient in Neurology Clinic today  As you know, she is a 50-year-old woman who was referred for evaluation for neuropathy  Please allow me to summarize her history for the record  As you know,She has h/o lower back pain for the last 2 years  The pain is mainly in the lower back, more so on the left side, and radiates to the left lower extremity  The pain starts from back, to the left buttocks, left posterior thigh, calf and the whole left foot  She does get paresthesias in the left leg, not so much in the right leg, except rare paresthesias in the right foot  She does not think she is weak in the leg, but the pain limits her to do things with the left leg  No balance issues, no tripping, leg does not give out on her  It is harder for her to take a flight of steps  She gets muscle spasms in the leg and lower back, takes robaxin, lyrica (75mg tid) and percocet as needed  She has had multiple (18) injections in the lower back, which have helped, but only for short periods  She has tried physical therapy twice, which made her pain worse  She does have history of diabetes for 8 years, not controlled  HbA1c was 9 1 last week  Has HTN, was recently hospitalized for hypertensive urgency  She had MRI of the thoracic and lumbar spine in August 2018 as a part of her recent hospitalization  I personally reviewed her images  MRI of the lumbar spine showed multilevel degenerative spondylosis without any significant canal stenosis  No significant neuroforaminal narrowing was seen  MRI of the thoracic spine showed minimal multilevel disc degeneration without any spinal canal or neuroforaminal  Stenosis  She has been recently diagnosed with aortic aneurysm in the thoracic aorta measuring 4 3 X 4 1 cm  The following portions of the patient's history were reviewed and updated as appropriate: allergies, current medications, past family history, past medical history, past social history, past surgical history and problem list        Objective:    Blood pressure 148/90, pulse 93, resp   rate 18, height 5' 10" (1 778 m), weight 87 5 kg (193 lb)  Physical Exam  General exam: Pt was awake, alert and oriented  HEENT: atraumatic, normocephalic  Normal oral mucosa, neck was supple, no lymphadenopathy  Normal peripheral pulses, heart sounds were normal  Chest was clear  Extremities did not show any edema or cyanosis  Neurological Exam  Neurologically, pt was awake and alert  Speech was normal, no dysarthria or aphasia  Cranial nerve exam showed normal extraocular movements, no nystagmus or diplopia  There was no ptosis at baseline or with sustained upward gaze  Strength of eye closure muscles was normal   Facial sensations were normal bilaterally  No facial weakness, able to blow out the cheeks and push the tongue in the cheeks well  No tongue atrophy or fasciculations  Motor exam revealed normal tone and muscle bulk  There was no atrophy, scapular winging,high arches, hammertoes, shortening of achilles tendons or any other features of neuromuscular disease  Muscle strength was normal in neck flexors and extensors, and all muscle groups in both upper extremities,  Muscle strength was full in all muscle groups in the right lower extremity, left lower extremity muscle stress strength testing was limited by her effort due to the pain however she was able to give me full effort in almost all the muscles except left hip flexors which was limited due to her pain  Reflexes were graded as 2 in the upper extremities, decreased in the lowers  Toes were downgoing  There was no exaggerated jaw jerk or tijerina's sign  No ankle clonus  Sensory examination revealed hyperesthesia to pinprick and temperature in a patchy distribution in the left lower extremity, vibration was mildly reduced at the toes on the right and pinprick was reduced up to mid foot on the right  Gait was slow and cautious, antalgic  ROS:  I reviewed the below ROS and what is mentioned in HPI, the remainder of ROS was negative    Review of Systems Constitutional: Positive for appetite change  Negative for fever  HENT: Positive for congestion  Negative for hearing loss, tinnitus, trouble swallowing and voice change  Eyes: Negative  Negative for photophobia and pain  Blurred vision   Respiratory: Positive for cough  Negative for shortness of breath  Cardiovascular: Negative  Negative for palpitations  Gastrointestinal: Negative  Negative for nausea and vomiting  Endocrine: Negative  Negative for cold intolerance and heat intolerance  Genitourinary: Negative  Negative for dysuria, frequency and urgency  Musculoskeletal: Positive for back pain  Negative for myalgias and neck pain  Pain while walking, Muscle pain   Skin: Negative  Negative for rash  Neurological: Positive for dizziness and numbness  Negative for tremors, seizures, syncope, facial asymmetry, speech difficulty, weakness, light-headedness and headaches  Difficulty walking, Twitching   Hematological: Bruises/bleeds easily (easy bruising)  Psychiatric/Behavioral: Positive for dysphoric mood and sleep disturbance  Negative for confusion and hallucinations  The patient is nervous/anxious           Memory problems, Depression

## 2018-10-07 ENCOUNTER — HOSPITAL ENCOUNTER (EMERGENCY)
Facility: HOSPITAL | Age: 53
Discharge: HOME | End: 2018-10-07
Attending: EMERGENCY MEDICINE | Admitting: EMERGENCY MEDICINE
Payer: COMMERCIAL

## 2018-10-07 ENCOUNTER — APPOINTMENT (EMERGENCY)
Dept: CT IMAGING | Facility: HOSPITAL | Age: 53
End: 2018-10-07
Payer: COMMERCIAL

## 2018-10-07 VITALS
TEMPERATURE: 98.7 F | SYSTOLIC BLOOD PRESSURE: 181 MMHG | OXYGEN SATURATION: 95 % | RESPIRATION RATE: 18 BRPM | WEIGHT: 195.77 LBS | BODY MASS INDEX: 28.09 KG/M2 | DIASTOLIC BLOOD PRESSURE: 99 MMHG | HEART RATE: 90 BPM

## 2018-10-07 DIAGNOSIS — R42 VERTIGO: ICD-10-CM

## 2018-10-07 DIAGNOSIS — R42 DIZZINESS: Primary | ICD-10-CM

## 2018-10-07 DIAGNOSIS — E87.6 HYPOKALEMIA: ICD-10-CM

## 2018-10-07 LAB
ALBUMIN SERPL BCP-MCNC: 3.2 G/DL (ref 3.5–5)
ALP SERPL-CCNC: 98 U/L (ref 46–116)
ALT SERPL W P-5'-P-CCNC: 26 U/L (ref 12–78)
ANION GAP SERPL CALCULATED.3IONS-SCNC: 10 MMOL/L (ref 4–13)
APTT PPP: 28 SECONDS (ref 24–36)
AST SERPL W P-5'-P-CCNC: 16 U/L (ref 5–45)
BACTERIA UR QL AUTO: ABNORMAL /HPF
BASOPHILS # BLD AUTO: 0.02 THOUSANDS/ΜL (ref 0–0.1)
BASOPHILS NFR BLD AUTO: 0 % (ref 0–1)
BILIRUB SERPL-MCNC: 0.4 MG/DL (ref 0.2–1)
BILIRUB UR QL STRIP: NEGATIVE
BUN SERPL-MCNC: 10 MG/DL (ref 5–25)
CALCIUM SERPL-MCNC: 9 MG/DL (ref 8.3–10.1)
CHLORIDE SERPL-SCNC: 99 MMOL/L (ref 100–108)
CLARITY UR: ABNORMAL
CO2 SERPL-SCNC: 29 MMOL/L (ref 21–32)
COLOR UR: YELLOW
CREAT SERPL-MCNC: 0.84 MG/DL (ref 0.6–1.3)
EOSINOPHIL # BLD AUTO: 0.16 THOUSAND/ΜL (ref 0–0.61)
EOSINOPHIL NFR BLD AUTO: 1 % (ref 0–6)
ERYTHROCYTE [DISTWIDTH] IN BLOOD BY AUTOMATED COUNT: 14.3 % (ref 11.6–15.1)
GFR SERPL CREATININE-BSD FRML MDRD: 92 ML/MIN/1.73SQ M
GLUCOSE SERPL-MCNC: 244 MG/DL (ref 65–140)
GLUCOSE UR STRIP-MCNC: NEGATIVE MG/DL
HCT VFR BLD AUTO: 40.9 % (ref 34.8–46.1)
HGB BLD-MCNC: 13.5 G/DL (ref 11.5–15.4)
HGB UR QL STRIP.AUTO: ABNORMAL
IMM GRANULOCYTES # BLD AUTO: 0.04 THOUSAND/UL (ref 0–0.2)
IMM GRANULOCYTES NFR BLD AUTO: 0 % (ref 0–2)
INR PPP: 0.95 (ref 0.86–1.17)
KETONES UR STRIP-MCNC: NEGATIVE MG/DL
LEUKOCYTE ESTERASE UR QL STRIP: ABNORMAL
LYMPHOCYTES # BLD AUTO: 3.11 THOUSANDS/ΜL (ref 0.6–4.47)
LYMPHOCYTES NFR BLD AUTO: 26 % (ref 14–44)
MCH RBC QN AUTO: 28.1 PG (ref 26.8–34.3)
MCHC RBC AUTO-ENTMCNC: 33 G/DL (ref 31.4–37.4)
MCV RBC AUTO: 85 FL (ref 82–98)
MONOCYTES # BLD AUTO: 0.93 THOUSAND/ΜL (ref 0.17–1.22)
MONOCYTES NFR BLD AUTO: 8 % (ref 4–12)
NEUTROPHILS # BLD AUTO: 7.77 THOUSANDS/ΜL (ref 1.85–7.62)
NEUTS SEG NFR BLD AUTO: 65 % (ref 43–75)
NITRITE UR QL STRIP: NEGATIVE
NON-SQ EPI CELLS URNS QL MICRO: ABNORMAL /HPF
NRBC BLD AUTO-RTO: 0 /100 WBCS
OTHER STN SPEC: ABNORMAL
PH UR STRIP.AUTO: 5.5 [PH] (ref 4.5–8)
PLATELET # BLD AUTO: 317 THOUSANDS/UL (ref 149–390)
PMV BLD AUTO: 11.4 FL (ref 8.9–12.7)
POTASSIUM SERPL-SCNC: 3.1 MMOL/L (ref 3.5–5.3)
PROT SERPL-MCNC: 7.4 G/DL (ref 6.4–8.2)
PROT UR STRIP-MCNC: ABNORMAL MG/DL
PROTHROMBIN TIME: 12.4 SECONDS (ref 11.8–14.2)
RBC # BLD AUTO: 4.8 MILLION/UL (ref 3.81–5.12)
RBC #/AREA URNS AUTO: ABNORMAL /HPF
SODIUM SERPL-SCNC: 138 MMOL/L (ref 136–145)
SP GR UR STRIP.AUTO: 1.01 (ref 1–1.03)
TROPONIN I SERPL-MCNC: <0.02 NG/ML
UROBILINOGEN UR QL STRIP.AUTO: 0.2 E.U./DL
WBC # BLD AUTO: 12.03 THOUSAND/UL (ref 4.31–10.16)
WBC #/AREA URNS AUTO: ABNORMAL /HPF

## 2018-10-07 PROCEDURE — 85730 THROMBOPLASTIN TIME PARTIAL: CPT | Performed by: PHYSICIAN ASSISTANT

## 2018-10-07 PROCEDURE — 87147 CULTURE TYPE IMMUNOLOGIC: CPT | Performed by: PHYSICIAN ASSISTANT

## 2018-10-07 PROCEDURE — 80053 COMPREHEN METABOLIC PANEL: CPT | Performed by: PHYSICIAN ASSISTANT

## 2018-10-07 PROCEDURE — 87086 URINE CULTURE/COLONY COUNT: CPT | Performed by: PHYSICIAN ASSISTANT

## 2018-10-07 PROCEDURE — 99284 EMERGENCY DEPT VISIT MOD MDM: CPT

## 2018-10-07 PROCEDURE — 93005 ELECTROCARDIOGRAM TRACING: CPT

## 2018-10-07 PROCEDURE — 81001 URINALYSIS AUTO W/SCOPE: CPT | Performed by: PHYSICIAN ASSISTANT

## 2018-10-07 PROCEDURE — 85610 PROTHROMBIN TIME: CPT | Performed by: PHYSICIAN ASSISTANT

## 2018-10-07 PROCEDURE — 85025 COMPLETE CBC W/AUTO DIFF WBC: CPT | Performed by: PHYSICIAN ASSISTANT

## 2018-10-07 PROCEDURE — 36415 COLL VENOUS BLD VENIPUNCTURE: CPT | Performed by: PHYSICIAN ASSISTANT

## 2018-10-07 PROCEDURE — 84484 ASSAY OF TROPONIN QUANT: CPT | Performed by: PHYSICIAN ASSISTANT

## 2018-10-07 PROCEDURE — 70450 CT HEAD/BRAIN W/O DYE: CPT

## 2018-10-07 PROCEDURE — 96360 HYDRATION IV INFUSION INIT: CPT

## 2018-10-07 RX ORDER — MECLIZINE HCL 12.5 MG/1
25 TABLET ORAL ONCE
Status: COMPLETED | OUTPATIENT
Start: 2018-10-07 | End: 2018-10-07

## 2018-10-07 RX ORDER — MECLIZINE HYDROCHLORIDE 25 MG/1
25 TABLET ORAL EVERY 8 HOURS PRN
Qty: 30 TABLET | Refills: 0 | Status: SHIPPED | OUTPATIENT
Start: 2018-10-07 | End: 2021-09-02 | Stop reason: SDUPTHER

## 2018-10-07 RX ORDER — POTASSIUM CHLORIDE 750 MG/1
20 CAPSULE, EXTENDED RELEASE ORAL DAILY
Qty: 7 CAPSULE | Refills: 0 | Status: SHIPPED | OUTPATIENT
Start: 2018-10-07 | End: 2019-03-04

## 2018-10-07 RX ORDER — POTASSIUM CHLORIDE 20 MEQ/1
40 TABLET, EXTENDED RELEASE ORAL ONCE
Status: COMPLETED | OUTPATIENT
Start: 2018-10-07 | End: 2018-10-07

## 2018-10-07 RX ADMIN — MECLIZINE 25 MG: 12.5 TABLET ORAL at 15:00

## 2018-10-07 RX ADMIN — POTASSIUM CHLORIDE 40 MEQ: 1500 TABLET, EXTENDED RELEASE ORAL at 17:10

## 2018-10-07 RX ADMIN — SODIUM CHLORIDE 1000 ML: 0.9 INJECTION, SOLUTION INTRAVENOUS at 15:14

## 2018-10-07 NOTE — ED NOTES
Pt  Ambulated out of dept , vss, normal gait, no acute distress       Paras Dallas, MONE  10/07/18 2282

## 2018-10-07 NOTE — ED PROVIDER NOTES
History  Chief Complaint   Patient presents with    Dizziness     Pt  c/o dizziness after bending over while cleaning leaves in yard  Pt  also c/o shortness of breath and states "I have a lot going on and feel anxious right now "     Pt presents to the ED after she was out side raking leaves and bent over to  a piece of wood and became dizzy  In the ED she states that if she stays still she has no s/x, but if she moves her head or gets up she feels dizzy again  She denies c/p, sob, n/v/d, h/a, fevers, chills, abd pain  Prior to Admission Medications   Prescriptions Last Dose Informant Patient Reported? Taking?    ALPRAZolam (XANAX) 0 5 mg tablet   Yes No   Sig: Take 0 25 mg by mouth 2 (two) times a day as needed     Dapagliflozin-Metformin HCl (XIGDUO XR PO)   Yes No   Sig: Take 5 mg by mouth daily   Dulaglutide (TRULICITY) 1 5 EK/1 7LI SOPN   Yes No   Sig: Inject 1 5 mg under the skin once a week   Glucose Blood (TRUE FOCUS BLOOD GLUCOSE STRIP VI)   Yes No   Si Device by Does not apply route 4 (four) times a day   HYDROCHLOROTHIAZIDE PO   Yes No   Sig: Take 25 mg by mouth     Insulin Glulisine (APIDRA SOLOSTAR SC)   Yes No   Sig: Inject 20 Units under the skin 3 (three) times a day   Insulin Pen Needle (ULTICARE MICRO PEN NEEDLES) 32G X 4 MM MISC   Yes No   Si Device by Does not apply route 4 (four) times a day   Insulin Pen Needle (UNIFINE PENTIPS PLUS) 31G X 6 MM MISC   Yes No   Si Device by Does not apply route 4 (four) times a day   Lancets MISC   Yes No   Si Device by Does not apply route 4 (four) times a day   amLODIPine (NORVASC) 10 mg tablet   Yes No   Sig: Take 10 mg by mouth daily   escitalopram (LEXAPRO) 10 mg tablet   Yes No   Sig: Take 5 mg by mouth daily   hydrALAZINE (APRESOLINE) 25 mg tablet   No No   Sig: Take 1 tablet (25 mg total) by mouth every 8 (eight) hours   insulin glargine (LANTUS) 100 units/mL subcutaneous injection   Yes No   Sig: Inject 60 Units under the skin daily at bedtime   lisinopril (ZESTRIL) 40 mg tablet   Yes No   Sig: Take 40 mg by mouth daily   methocarbamol (ROBAXIN) 500 mg tablet   No No   Sig: Take 1 tablet (500 mg total) by mouth every 6 (six) hours as needed for muscle spasms for up to 3 days   metoprolol tartrate (LOPRESSOR) 100 mg tablet   No No   Sig: Take 1 tablet (100 mg total) by mouth every 12 (twelve) hours   ondansetron (ZOFRAN) 4 mg tablet   Yes No   Sig: Take 1 tablet by mouth 3 (three) times a day as needed for nausea   oxyCODONE-acetaminophen (PERCOCET) 5-325 mg per tablet  Self Yes No   Sig: Take 1 tablet by mouth every 4 (four) hours as needed for moderate pain   pantoprazole (PROTONIX) 20 mg tablet   No No   Sig: Take 1 tablet (20 mg total) by mouth 2 (two) times a day before meals   pregabalin (LYRICA) 75 mg capsule  Self Yes No   Sig: Take 75 mg by mouth 3 (three) times a day   simvastatin (ZOCOR) 10 mg tablet   Yes No   Sig: Take 1 tablet by mouth daily      Facility-Administered Medications: None       Past Medical History:   Diagnosis Date    Arthritis     Diabetes mellitus ([facility] [address] )     Fibromyalgia     GERD (gastroesophageal reflux disease)     Hyperlipidemia     Hypertension     Psychiatric disorder     anxiety       Past Surgical History:   Procedure Laterality Date    CHOLECYSTECTOMY      HYSTERECTOMY      OVARIAN CYST REMOVAL      TUBAL LIGATION         Family History   Problem Relation Age of Onset    Hypertension Mother     Diabetes Father     Other Sister         renal cell carcinoma    Diabetes Other      I have reviewed and agree with the history as documented  Social History   Substance Use Topics    Smoking status: Current Every Day Smoker     Packs/day: 1 00    Smokeless tobacco: Never Used    Alcohol use No        Review of Systems   Constitutional: Negative for chills, diaphoresis, fatigue and fever  HENT: Negative for congestion, ear pain, rhinorrhea, sneezing and sore throat  Respiratory: Negative for cough, shortness of breath, wheezing and stridor  Cardiovascular: Negative for chest pain, palpitations and leg swelling  Gastrointestinal: Negative for abdominal distention, abdominal pain, blood in stool, constipation, diarrhea, nausea and vomiting  Genitourinary: Negative for difficulty urinating, dysuria, frequency, hematuria and urgency  Musculoskeletal: Negative for arthralgias, back pain, gait problem, joint swelling, myalgias and neck pain  Skin: Negative for rash  Neurological: Positive for dizziness  Negative for syncope, weakness, light-headedness and headaches  All other systems reviewed and are negative  Physical Exam  Physical Exam   Constitutional: She is oriented to person, place, and time  She appears well-developed and well-nourished  No distress  HENT:   Head: Normocephalic and atraumatic  Right Ear: External ear normal    Left Ear: External ear normal    Nose: Nose normal    Mouth/Throat: Oropharynx is clear and moist    Eyes: Pupils are equal, round, and reactive to light  Conjunctivae and EOM are normal    Neck: Normal range of motion  Neck supple  No thyromegaly present  Cardiovascular: Normal rate, regular rhythm and normal heart sounds  Exam reveals no gallop and no friction rub  No murmur heard  Pulmonary/Chest: Effort normal and breath sounds normal  No stridor  Musculoskeletal: She exhibits no edema, tenderness or deformity  Lymphadenopathy:     She has no cervical adenopathy  Neurological: She is alert and oriented to person, place, and time  Skin: Skin is warm and dry  No rash noted  She is not diaphoretic  No erythema  No pallor  Psychiatric: She has a normal mood and affect  Her behavior is normal    Nursing note and vitals reviewed        Vital Signs  ED Triage Vitals   Temperature Pulse Respirations Blood Pressure SpO2   10/07/18 1358 10/07/18 1355 10/07/18 1355 10/07/18 1355 10/07/18 1355   98 7 °F (37 1 °C) (!) 109 20 153/95 100 %      Temp Source Heart Rate Source Patient Position - Orthostatic VS BP Location FiO2 (%)   10/07/18 1358 10/07/18 1605 10/07/18 1605 10/07/18 1605 --   Oral Monitor Lying Right arm       Pain Score       10/07/18 1355       8           Vitals:    10/07/18 1530 10/07/18 1605 10/07/18 1615 10/07/18 1630   BP:  (!) 181/99 (!) 181/99    Pulse: 98 84 88 90   Patient Position - Orthostatic VS:  Lying         Visual Acuity      ED Medications  Medications   sodium chloride 0 9 % bolus 1,000 mL (0 mL Intravenous Stopped 10/7/18 1614)   meclizine (ANTIVERT) tablet 25 mg (25 mg Oral Given 10/7/18 1500)   potassium chloride (K-DUR,KLOR-CON) CR tablet 40 mEq (40 mEq Oral Given 10/7/18 1710)       Diagnostic Studies  Results Reviewed     Procedure Component Value Units Date/Time    Troponin I [[id number]]  (Normal) Collected:  10/07/18 1516    Lab Status:  Final result Specimen:  Blood from Arm, Right Updated:  10/07/18 1657     Troponin I <0 02 ng/mL     Comprehensive metabolic panel [[id number]]  (Abnormal) Collected:  10/07/18 1516    Lab Status:  Final result Specimen:  Blood from Arm, Right Updated:  10/07/18 1655     Sodium 138 mmol/L      Potassium 3 1 (L) mmol/L      Chloride 99 (L) mmol/L      CO2 29 mmol/L      ANION GAP 10 mmol/L      BUN 10 mg/dL      Creatinine 0 84 mg/dL      Glucose 244 (H) mg/dL      Calcium 9 0 mg/dL      AST 16 U/L      ALT 26 U/L      Alkaline Phosphatase 98 U/L      Total Protein 7 4 g/dL      Albumin 3 2 (L) g/dL      Total Bilirubin 0 40 mg/dL      eGFR 92 ml/min/1 73sq m     Narrative:         National Kidney Disease Education Program recommendations are as follows:  GFR calculation is accurate only with a steady state creatinine  Chronic Kidney disease less than 60 ml/min/1 73 sq  meters  Kidney failure less than 15 ml/min/1 73 sq  meters      Protime-INR [[id number]]  (Normal) Collected:  10/07/18 1516    Lab Status:  Final result Specimen:  Blood from Arm, Right Updated: 10/07/18 1647     Protime 12 4 seconds      INR 0 95    APTT [[id number]]  (Normal) Collected:  10/07/18 1516    Lab Status:  Final result Specimen:  Blood from Arm, Right Updated:  10/07/18 1647     PTT 28 seconds     CBC and differential [[id number]]  (Abnormal) Collected:  10/07/18 1516    Lab Status:  Final result Specimen:  Blood from Arm, Right Updated:  10/07/18 1637     WBC 12 03 (H) Thousand/uL      RBC 4 80 Million/uL      Hemoglobin 13 5 g/dL      Hematocrit 40 9 %      MCV 85 fL      MCH 28 1 pg      MCHC 33 0 g/dL      RDW 14 3 %      MPV 11 4 fL      Platelets 050 Thousands/uL      nRBC 0 /100 WBCs      Neutrophils Relative 65 %      Immat GRANS % 0 %      Lymphocytes Relative 26 %      Monocytes Relative 8 %      Eosinophils Relative 1 %      Basophils Relative 0 %      Neutrophils Absolute 7 77 (H) Thousands/µL      Immature Grans Absolute 0 04 Thousand/uL      Lymphocytes Absolute 3 11 Thousands/µL      Monocytes Absolute 0 93 Thousand/µL      Eosinophils Absolute 0 16 Thousand/µL      Basophils Absolute 0 02 Thousands/µL     Urine Microscopic [[id number]]  (Abnormal) Collected:  10/07/18 1605    Lab Status:  Final result Specimen:  Urine from Urine, Clean Catch Updated:  10/07/18 1624     RBC, UA None Seen /hpf      WBC, UA 30-50 (A) /hpf      Epithelial Cells Occasional /hpf      Bacteria, UA Occasional /hpf      OTHER OBSERVATIONS Trichomonas Organisms Present    Urine culture [[id number]] Collected:  10/07/18 1605    Lab Status:   In process Specimen:  Urine from Urine, Clean Catch Updated:  10/07/18 1624    UA w Reflex to Microscopic w Reflex to Culture [[id number]]  (Abnormal) Collected:  10/07/18 1605    Lab Status:  Final result Specimen:  Urine from Urine, Clean Catch Updated:  10/07/18 1613     Color, UA Yellow     Clarity, UA Slightly Cloudy     Specific [place], UA 1 010     pH, UA 5 5     Leukocytes, UA Large (A)     Nitrite, UA Negative     Protein, UA 30 (1+) (A) mg/dl      Glucose, UA Negative mg/dl      Ketones, UA Negative mg/dl      Urobilinogen, UA 0 2 E U /dl      Bilirubin, UA Negative     Blood, UA Trace-Intact (A)                 CT head without contrast   Final Result by Elana Shepherd DO (10/07 1613)      No acute intracranial abnormality  Workstation performed: [id number]                    Procedures  Procedures       Phone Contacts  ED Phone Contact    ED Course         HEART Risk Score      Most Recent Value   History  0 Filed at: 10/07/2018 1617   ECG  0 Filed at: 10/07/2018 1617   Age  1 Filed at: 10/07/2018 1617   Risk Factors  1 Filed at: 10/07/2018 1617   Troponin  0 Filed at: 10/07/2018 1617   Heart Score Risk Calculator   History  0 Filed at: 10/07/2018 1617   ECG  0 Filed at: 10/07/2018 1617   Age  1 Filed at: 10/07/2018 1617   Risk Factors  1 Filed at: 10/07/2018 1617   Troponin  0 Filed at: 10/07/2018 1617   HEART Score  2 Filed at: 10/07/2018 1617   HEART Score  2 Filed at: 10/07/2018 1617                            [facility]  Number of Diagnoses or Management Options  Dizziness:   Hypokalemia:   Vertigo:   Diagnosis management comments: Patient's labs and imaging are reassuring she is feeling better after IV fluids and meclizine  States that her symptoms have resolved  She is tolerating p o  Without nausea or vomiting in the ED  She has remained clinically and hemodynamically stable throughout her ER course and is stable for discharge to home and outpatient follow-up return precautions and anticipatory guidance was discussed with patient and she verbalized understanding      CritCare Time    Disposition  Final diagnoses:   Dizziness   Hypokalemia   Vertigo     Time reflects when diagnosis was documented in both MDM as applicable and the Disposition within this note     Time User Action Codes Description Comment    10/7/2018  4:58 PM Avelina Kumar Add [R42] Dizziness     10/7/2018  4:59 PM Avelina Kumar Add [E87 6] Hypokalemia     10/7/2018  5:01 PM Avelina Kumar Add [R42] Vertigo       ED Disposition     ED Disposition Condition Comment    Discharge  Therese Red discharge to home/self care  Condition at discharge: Good        Follow-up Information     Follow up With Specialties Details Why Contact Info    Reyan Briggs MD  In 3 days  [address]  [phone]            Patient's Medications   Discharge Prescriptions    MECLIZINE (ANTIVERT) 25 MG TABLET    Take 1 tablet (25 mg total) by mouth every 8 (eight) hours as needed for dizziness       Start Date: 10/7/2018 End Date: --       Order Dose: 25 mg       Quantity: 30 tablet    Refills: 0    POTASSIUM CHLORIDE (MICRO-K) 10 MEQ CR CAPSULE    Take 2 capsules (20 mEq total) by mouth daily       Start Date: 10/7/2018 End Date: --       Order Dose: 20 mEq       Quantity: 7 capsule    Refills: 0     No discharge procedures on file      ED Provider  Electronically Signed by           Salas Senior PA-C  10/07/18 0837

## 2018-10-07 NOTE — DISCHARGE INSTRUCTIONS
Dizziness, Ambulatory Care   GENERAL INFORMATION:   Dizziness  is a term used to describe a feeling of being off balance or unsteady  Common causes of dizziness are an inner ear fluid imbalance or a lack of oxygen in your blood  Your dizziness may be acute (lasts 3 days or less) or chronic (lasts longer than 3 days)  You may have dizzy spells that last from seconds to a few hours  Common symptoms related to dizziness include the following:   · A feeling that your surroundings are moving even though you are standing still    · Ringing in your ears or hearing loss     · Feeling faint or lightheaded     · Weakness or unsteadiness     · Double vision or eye movements you cannot control    · Nausea or vomiting     · Confusion  Seek immediate care for the following symptoms:   · You have a headache that does not go away with medicine  · You have shaking chills and a fever  · You vomit over and over with no relief  · Your vomit or bowel movements are red or black  · You have pain in your chest, back, or abdomen  · You have numbness, especially in your face, arms, or legs  · You have trouble moving your arms or legs  Treatment for dizziness  depends on the cause of your dizziness  You may need medicines to decrease your dizziness or nausea  You may need to be admitted to the hospital for treatment  Manage your symptoms:  Get up slowly from sitting or lying down  Do not drive or operate machinery when you are dizzy  Follow up with your healthcare provider as directed:  Write down your questions so you remember to ask them during your visits  CARE AGREEMENT:   You have the right to help plan your care  Learn about your health condition and how it may be treated  Discuss treatment options with your caregivers to decide what care you want to receive  You always have the right to refuse treatment  The above information is an  only   It is not intended as medical advice for individual conditions or treatments  Talk to your doctor, nurse or pharmacist before following any medical regimen to see if it is safe and effective for you  © 2014 [address] is for End User's use only and may not be sold, redistributed or otherwise used for commercial purposes  All illustrations and images included in CareNotes® are the copyrighted property of [employer]  or Aiden Contreras  Hypokalemia   WHAT YOU NEED TO KNOW:   Hypokalemia is a low level of potassium in your blood  Potassium helps control how your muscles, heart, and digestive system work  Hypokalemia occurs when your body loses too much potassium or does not absorb enough from food  DISCHARGE INSTRUCTIONS:   Seek care immediately if:   · You cannot move your arm or leg  · You have a fast or irregular heartbeat  · You are too tired or weak to stand up  Contact your healthcare provider if:   · You are vomiting, or you have diarrhea  · You have numbness or tingling in your arms or legs  · Your symptoms do not go away or they get worse  · You have questions or concerns about your condition or care  Medicines:   · Potassium  will be given to bring your potassium levels back to normal     · Take your medicine as directed  Contact your healthcare provider if you think your medicine is not helping or if you have side effects  Tell him of her if you are allergic to any medicine  Keep a list of the medicines, vitamins, and herbs you take  Include the amounts, and when and why you take them  Bring the list or the pill bottles to follow-up visits  Carry your medicine list with you in case of an emergency  Eat foods that are high in potassium:  Foods that are high in potassium include bananas, oranges, tomatoes, potatoes, and avocado  Sabillon beans, turkey, salmon, lean beef, yogurt, and milk are also high in potassium   Ask your healthcare provider or dietitian for more information about foods that are high in potassium  Follow up with your healthcare provider as directed:  Write down your questions so you remember to ask them during your visits  © 2017 [address] Information is for End User's use only and may not be sold, redistributed or otherwise used for commercial purposes  All illustrations and images included in CareNotes® are the copyrighted property of A D A M [employer] Inc  or Aiden Contreras  The above information is an  only  It is not intended as medical advice for individual conditions or treatments  Talk to your doctor, nurse or pharmacist before following any medical regimen to see if it is safe and effective for you

## 2018-10-09 LAB
ATRIAL RATE: 105 BPM
BACTERIA UR CULT: ABNORMAL
BACTERIA UR CULT: ABNORMAL
P AXIS: 53 DEGREES
PR INTERVAL: 122 MS
QRS AXIS: -50 DEGREES
QRSD INTERVAL: 90 MS
QT INTERVAL: 360 MS
QTC INTERVAL: 475 MS
T WAVE AXIS: 52 DEGREES
VENTRICULAR RATE: 105 BPM

## 2018-10-09 PROCEDURE — 93010 ELECTROCARDIOGRAM REPORT: CPT | Performed by: INTERNAL MEDICINE

## 2018-12-19 ENCOUNTER — HOSPITAL ENCOUNTER (OUTPATIENT)
Dept: NEUROLOGY | Facility: AMBULATORY SURGERY CENTER | Age: 53
Discharge: HOME | End: 2018-12-19
Payer: COMMERCIAL

## 2018-12-19 DIAGNOSIS — M54.32 SCIATICA OF LEFT SIDE: ICD-10-CM

## 2018-12-19 PROCEDURE — 95910 NRV CNDJ TEST 7-8 STUDIES: CPT | Performed by: PSYCHIATRY & NEUROLOGY

## 2018-12-19 PROCEDURE — 95886 MUSC TEST DONE W/N TEST COMP: CPT | Performed by: PSYCHIATRY & NEUROLOGY

## 2019-01-13 ENCOUNTER — HOSPITAL ENCOUNTER (EMERGENCY)
Facility: HOSPITAL | Age: 54
Discharge: HOME | End: 2019-01-13
Attending: EMERGENCY MEDICINE | Admitting: EMERGENCY MEDICINE
Payer: COMMERCIAL

## 2019-01-13 ENCOUNTER — APPOINTMENT (EMERGENCY)
Dept: CT IMAGING | Facility: HOSPITAL | Age: 54
End: 2019-01-13
Payer: COMMERCIAL

## 2019-01-13 VITALS
DIASTOLIC BLOOD PRESSURE: 83 MMHG | WEIGHT: 204.15 LBS | BODY MASS INDEX: 29.29 KG/M2 | SYSTOLIC BLOOD PRESSURE: 144 MMHG | OXYGEN SATURATION: 99 % | HEART RATE: 86 BPM | TEMPERATURE: 97.5 F | RESPIRATION RATE: 15 BRPM

## 2019-01-13 DIAGNOSIS — K52.9 ENTERITIS: Primary | ICD-10-CM

## 2019-01-13 LAB
ALBUMIN SERPL BCP-MCNC: 3.1 G/DL (ref 3.5–5)
ALP SERPL-CCNC: 116 U/L (ref 46–116)
ALT SERPL W P-5'-P-CCNC: 25 U/L (ref 12–78)
ANION GAP SERPL CALCULATED.3IONS-SCNC: 8 MMOL/L (ref 4–13)
AST SERPL W P-5'-P-CCNC: 12 U/L (ref 5–45)
BASOPHILS # BLD AUTO: 0.02 THOUSANDS/ΜL (ref 0–0.1)
BASOPHILS NFR BLD AUTO: 0 % (ref 0–1)
BILIRUB SERPL-MCNC: 0.4 MG/DL (ref 0.2–1)
BUN SERPL-MCNC: 10 MG/DL (ref 5–25)
CALCIUM SERPL-MCNC: 8.5 MG/DL (ref 8.3–10.1)
CHLORIDE SERPL-SCNC: 101 MMOL/L (ref 100–108)
CO2 SERPL-SCNC: 31 MMOL/L (ref 21–32)
CREAT SERPL-MCNC: 0.87 MG/DL (ref 0.6–1.3)
EOSINOPHIL # BLD AUTO: 0.21 THOUSAND/ΜL (ref 0–0.61)
EOSINOPHIL NFR BLD AUTO: 2 % (ref 0–6)
ERYTHROCYTE [DISTWIDTH] IN BLOOD BY AUTOMATED COUNT: 14 % (ref 11.6–15.1)
GFR SERPL CREATININE-BSD FRML MDRD: 89 ML/MIN/1.73SQ M
GLUCOSE SERPL-MCNC: 237 MG/DL (ref 65–140)
HCT VFR BLD AUTO: 39.1 % (ref 34.8–46.1)
HGB BLD-MCNC: 12.7 G/DL (ref 11.5–15.4)
IMM GRANULOCYTES # BLD AUTO: 0.04 THOUSAND/UL (ref 0–0.2)
IMM GRANULOCYTES NFR BLD AUTO: 0 % (ref 0–2)
LIPASE SERPL-CCNC: 116 U/L (ref 73–393)
LYMPHOCYTES # BLD AUTO: 2.51 THOUSANDS/ΜL (ref 0.6–4.47)
LYMPHOCYTES NFR BLD AUTO: 22 % (ref 14–44)
MCH RBC QN AUTO: 28 PG (ref 26.8–34.3)
MCHC RBC AUTO-ENTMCNC: 32.5 G/DL (ref 31.4–37.4)
MCV RBC AUTO: 86 FL (ref 82–98)
MONOCYTES # BLD AUTO: 0.67 THOUSAND/ΜL (ref 0.17–1.22)
MONOCYTES NFR BLD AUTO: 6 % (ref 4–12)
NEUTROPHILS # BLD AUTO: 7.74 THOUSANDS/ΜL (ref 1.85–7.62)
NEUTS SEG NFR BLD AUTO: 70 % (ref 43–75)
NRBC BLD AUTO-RTO: 0 /100 WBCS
PLATELET # BLD AUTO: 261 THOUSANDS/UL (ref 149–390)
PMV BLD AUTO: 10.3 FL (ref 8.9–12.7)
POTASSIUM SERPL-SCNC: 3 MMOL/L (ref 3.5–5.3)
PROT SERPL-MCNC: 7.1 G/DL (ref 6.4–8.2)
RBC # BLD AUTO: 4.54 MILLION/UL (ref 3.81–5.12)
SODIUM SERPL-SCNC: 140 MMOL/L (ref 136–145)
WBC # BLD AUTO: 11.19 THOUSAND/UL (ref 4.31–10.16)

## 2019-01-13 PROCEDURE — 83690 ASSAY OF LIPASE: CPT | Performed by: EMERGENCY MEDICINE

## 2019-01-13 PROCEDURE — 96375 TX/PRO/DX INJ NEW DRUG ADDON: CPT

## 2019-01-13 PROCEDURE — 74177 CT ABD & PELVIS W/CONTRAST: CPT

## 2019-01-13 PROCEDURE — 85025 COMPLETE CBC W/AUTO DIFF WBC: CPT | Performed by: EMERGENCY MEDICINE

## 2019-01-13 PROCEDURE — 36415 COLL VENOUS BLD VENIPUNCTURE: CPT | Performed by: EMERGENCY MEDICINE

## 2019-01-13 PROCEDURE — 96374 THER/PROPH/DIAG INJ IV PUSH: CPT

## 2019-01-13 PROCEDURE — 99284 EMERGENCY DEPT VISIT MOD MDM: CPT

## 2019-01-13 PROCEDURE — 80053 COMPREHEN METABOLIC PANEL: CPT | Performed by: EMERGENCY MEDICINE

## 2019-01-13 RX ORDER — ONDANSETRON 2 MG/ML
4 INJECTION INTRAMUSCULAR; INTRAVENOUS ONCE
Status: COMPLETED | OUTPATIENT
Start: 2019-01-13 | End: 2019-01-13

## 2019-01-13 RX ORDER — KETOROLAC TROMETHAMINE 30 MG/ML
15 INJECTION, SOLUTION INTRAMUSCULAR; INTRAVENOUS ONCE
Status: COMPLETED | OUTPATIENT
Start: 2019-01-13 | End: 2019-01-13

## 2019-01-13 RX ORDER — LABETALOL 20 MG/4 ML (5 MG/ML) INTRAVENOUS SYRINGE
20 ONCE
Status: DISCONTINUED | OUTPATIENT
Start: 2019-01-13 | End: 2019-01-14 | Stop reason: HOSPADM

## 2019-01-13 RX ORDER — DICYCLOMINE HCL 20 MG
20 TABLET ORAL ONCE
Status: COMPLETED | OUTPATIENT
Start: 2019-01-13 | End: 2019-01-13

## 2019-01-13 RX ORDER — CIPROFLOXACIN 500 MG/1
500 TABLET, FILM COATED ORAL ONCE
Status: COMPLETED | OUTPATIENT
Start: 2019-01-13 | End: 2019-01-13

## 2019-01-13 RX ORDER — ONDANSETRON 4 MG/1
4 TABLET, FILM COATED ORAL 3 TIMES DAILY PRN
Qty: 20 TABLET | Refills: 0 | Status: SHIPPED | OUTPATIENT
Start: 2019-01-13 | End: 2020-06-02 | Stop reason: ALTCHOICE

## 2019-01-13 RX ORDER — DOXAZOSIN MESYLATE 4 MG/1
4 TABLET ORAL DAILY
COMMUNITY
End: 2021-04-30 | Stop reason: SDUPTHER

## 2019-01-13 RX ORDER — OMEPRAZOLE 20 MG/1
20 CAPSULE, DELAYED RELEASE ORAL DAILY
COMMUNITY
End: 2019-07-02

## 2019-01-13 RX ORDER — CIPROFLOXACIN 500 MG/1
500 TABLET, FILM COATED ORAL 2 TIMES DAILY
Qty: 14 TABLET | Refills: 0 | Status: SHIPPED | OUTPATIENT
Start: 2019-01-13 | End: 2019-01-20

## 2019-01-13 RX ORDER — DICYCLOMINE HCL 20 MG
20 TABLET ORAL EVERY 6 HOURS
Qty: 20 TABLET | Refills: 0 | Status: SHIPPED | OUTPATIENT
Start: 2019-01-13 | End: 2019-07-02

## 2019-01-13 RX ADMIN — DICYCLOMINE HYDROCHLORIDE 20 MG: 20 TABLET ORAL at 22:00

## 2019-01-13 RX ADMIN — IOHEXOL 100 ML: 350 INJECTION, SOLUTION INTRAVENOUS at 21:11

## 2019-01-13 RX ADMIN — CIPROFLOXACIN HYDROCHLORIDE 500 MG: 500 TABLET, FILM COATED ORAL at 22:00

## 2019-01-13 RX ADMIN — ONDANSETRON 4 MG: 2 INJECTION INTRAMUSCULAR; INTRAVENOUS at 20:48

## 2019-01-13 RX ADMIN — KETOROLAC TROMETHAMINE 15 MG: 30 INJECTION, SOLUTION INTRAMUSCULAR at 20:46

## 2019-01-14 NOTE — ED PROVIDER NOTES
History  Chief Complaint   Patient presents with    Abdominal Pain     started with upper abdominal pain on friday  went to [facility] and admitted, states they did CTs and Xray and "they honestly did nothing for me, i signed myself out"  states "they said something about a possible bowel obstruction  reports vomiting only Friday  reports watery diarrhea on friday and saturday  reports more formed stool today  pmh : diabetes, Htn, AAA     72-year-old female comes in complaining of abdominal pain  Patient states she started with upper lower abdominal pain on Friday went to [facility] and was admitted  Patient states that she had blood work and a CAT scan but was not given a definitive diagnosis patient was not feeling any better and did not feel she was being treated appropriately so left against medical advice  Patient states she has had watery diarrhea since Friday but has stopped vomiting    Patient does have a history of diabetes high blood pressure and also a AAA        History provided by:  Patient   used: No    Abdominal Pain   Pain location:  Generalized  Pain quality: gnawing and pressure    Pain radiates to:  Does not radiate  Pain severity:  Moderate  Onset quality:  Gradual  Duration:  3 days  Timing:  Constant  Progression:  Improving  Chronicity:  New  Context: not alcohol use, not recent illness and not trauma    Ineffective treatments:  None tried  Associated symptoms: anorexia, diarrhea, nausea and vomiting    Associated symptoms: no chest pain, no cough, no fatigue, no fever, no hematuria and no shortness of breath    Diarrhea:     Quality:  Watery    Duration:  3 days    Timing:  Intermittent    Progression:  Improving  Nausea:     Severity:  Moderate    Onset quality:  Gradual    Duration:  3 days    Timing:  Constant    Progression:  Unchanged  Vomiting:     Quality:  Stomach contents    Severity:  Mild    Duration:  1 day    Progression:  Resolved  Risk factors: recent hospitalization    Risk factors: no alcohol abuse and not pregnant        Prior to Admission Medications   Prescriptions Last Dose Informant Patient Reported? Taking?    ALPRAZolam (XANAX) 0 5 mg tablet   Yes Yes   Sig: Take 0 25 mg by mouth 2 (two) times a day as needed     Dapagliflozin-Metformin HCl (XIGDUO XR PO)   Yes Yes   Sig: Take 5 mg by mouth daily   Dulaglutide (TRULICITY) 1 5 GD/4 6PJ SOPN   Yes Yes   Sig: Inject 1 5 mg under the skin once a week   Glucose Blood (TRUE FOCUS BLOOD GLUCOSE STRIP VI)   Yes Yes   Si Device by Does not apply route 4 (four) times a day   HYDROCHLOROTHIAZIDE PO   Yes Yes   Sig: Take 25 mg by mouth     Insulin Glulisine (APIDRA SOLOSTAR SC)   Yes Yes   Sig: Inject 20 Units under the skin 3 (three) times a day   Insulin Pen Needle (ULTICARE MICRO PEN NEEDLES) 32G X 4 MM MISC   Yes Yes   Si Device by Does not apply route 4 (four) times a day   Insulin Pen Needle (UNIFINE PENTIPS PLUS) 31G X 6 MM MISC   Yes Yes   Si Device by Does not apply route 4 (four) times a day   Lancets MISC   Yes Yes   Si Device by Does not apply route 4 (four) times a day   amLODIPine (NORVASC) 10 mg tablet   Yes Yes   Sig: Take 10 mg by mouth daily   doxazosin (CARDURA) 4 mg tablet   Yes Yes   Sig: Take 4 mg by mouth daily   escitalopram (LEXAPRO) 10 mg tablet   Yes Yes   Sig: Take 5 mg by mouth daily   hydrALAZINE (APRESOLINE) 25 mg tablet   No No   Sig: Take 1 tablet (25 mg total) by mouth every 8 (eight) hours   insulin glargine (LANTUS) 100 units/mL subcutaneous injection   Yes Yes   Sig: Inject 60 Units under the skin daily at bedtime   lisinopril (ZESTRIL) 40 mg tablet   Yes Yes   Sig: Take 40 mg by mouth daily   meclizine (ANTIVERT) 25 mg tablet   No Yes   Sig: Take 1 tablet (25 mg total) by mouth every 8 (eight) hours as needed for dizziness   methocarbamol (ROBAXIN) 500 mg tablet   No No   Sig: Take 1 tablet (500 mg total) by mouth every 6 (six) hours as needed for muscle spasms for up to 3 days   metoprolol tartrate (LOPRESSOR) 100 mg tablet   No Yes   Sig: Take 1 tablet (100 mg total) by mouth every 12 (twelve) hours   Patient taking differently: Take 100 mg by mouth daily     omeprazole (PriLOSEC) 20 mg delayed release capsule   Yes Yes   Sig: Take 20 mg by mouth daily   ondansetron (ZOFRAN) 4 mg tablet   Yes No   Sig: Take 1 tablet by mouth 3 (three) times a day as needed for nausea   oxyCODONE-acetaminophen (PERCOCET) 5-325 mg per tablet  Self Yes No   Sig: Take 1 tablet by mouth every 4 (four) hours as needed for moderate pain   pantoprazole (PROTONIX) 20 mg tablet   No Yes   Sig: Take 1 tablet (20 mg total) by mouth 2 (two) times a day before meals   potassium chloride (MICRO-K) 10 MEQ CR capsule   No Yes   Sig: Take 2 capsules (20 mEq total) by mouth daily   pregabalin (LYRICA) 75 mg capsule  Self Yes Yes   Sig: Take 75 mg by mouth 3 (three) times a day   simvastatin (ZOCOR) 10 mg tablet   Yes Yes   Sig: Take 1 tablet by mouth daily      Facility-Administered Medications: None       Past Medical History:   Diagnosis Date    Aortic aneurysm (HCC)     Arthritis     Diabetes mellitus (HCC)     Fibromyalgia     GERD (gastroesophageal reflux disease)     Hyperlipidemia     Hypertension     Psychiatric disorder     anxiety       Past Surgical History:   Procedure Laterality Date    CARPAL TUNNEL RELEASE Left     CHOLECYSTECTOMY      HYSTERECTOMY      OVARIAN CYST REMOVAL      TUBAL LIGATION         Family History   Problem Relation Age of Onset    Hypertension Mother     Diabetes Father     Other Sister         renal cell carcinoma    Diabetes Other      I have reviewed and agree with the history as documented  Social History   Substance Use Topics    Smoking status: Current Every Day Smoker     Packs/day: 1 00    Smokeless tobacco: Never Used    Alcohol use No        Review of Systems   Constitutional: Negative for fatigue and fever     HENT: Negative for congestion and ear pain  Eyes: Negative for discharge and redness  Respiratory: Negative for apnea, cough, shortness of breath and wheezing  Cardiovascular: Negative for chest pain  Gastrointestinal: Positive for abdominal pain, anorexia, diarrhea, nausea and vomiting  Endocrine: Negative for cold intolerance and polydipsia  Genitourinary: Negative for difficulty urinating and hematuria  Musculoskeletal: Negative for arthralgias and back pain  Skin: Negative for color change and rash  Allergic/Immunologic: Negative for environmental allergies and immunocompromised state  Neurological: Negative for numbness and headaches  Hematological: Negative for adenopathy  Does not bruise/bleed easily  Psychiatric/Behavioral: Negative for agitation and behavioral problems  Physical Exam  Physical Exam   Constitutional: She is oriented to person, place, and time  Vital signs are normal  She appears well-developed and well-nourished  Non-toxic appearance  HENT:   Head: Normocephalic and atraumatic  Right Ear: Tympanic membrane and external ear normal    Left Ear: Tympanic membrane and external ear normal    Nose: Nose normal  No rhinorrhea, sinus tenderness or nasal deformity  Mouth/Throat: Uvula is midline and oropharynx is clear and moist  Normal dentition  Eyes: Pupils are equal, round, and reactive to light  Conjunctivae, EOM and lids are normal  Right eye exhibits no discharge  Left eye exhibits no discharge  Neck: Trachea normal and normal range of motion  Neck supple  No JVD present  Carotid bruit is not present  Cardiovascular: Normal rate, regular rhythm, intact distal pulses and normal pulses  No extrasystoles are present  PMI is not displaced  Pulmonary/Chest: Effort normal and breath sounds normal  No accessory muscle usage  No respiratory distress  She has no wheezes  She has no rhonchi  She has no rales  Abdominal: Soft   Normal appearance and bowel sounds are normal  She exhibits no mass  There is generalized tenderness  There is no rigidity, no rebound and no guarding  Musculoskeletal:        Right shoulder: She exhibits normal range of motion, no bony tenderness, no swelling and no deformity  Cervical back: Normal  She exhibits normal range of motion, no tenderness, no bony tenderness and no deformity  Lymphadenopathy:     She has no cervical adenopathy  She has no axillary adenopathy  Neurological: She is alert and oriented to person, place, and time  She has normal strength and normal reflexes  No cranial nerve deficit or sensory deficit  GCS eye subscore is 4  GCS verbal subscore is 5  GCS motor subscore is 6  Skin: Skin is warm and dry  No rash noted  Psychiatric: She has a normal mood and affect  Her speech is normal and behavior is normal    Nursing note and vitals reviewed        Vital Signs  ED Triage Vitals [01/13/19 1956]   Temperature Pulse Respirations Blood Pressure SpO2   97 5 °F (36 4 °C) 99 18 (!) 223/98 100 %      Temp Source Heart Rate Source Patient Position - Orthostatic VS BP Location FiO2 (%)   Oral Monitor Sitting Right arm --      Pain Score       8           Vitals:    01/13/19 1956 01/13/19 2028   BP: (!) 223/98 160/84   Pulse: 99 90   Patient Position - Orthostatic VS: Sitting Lying       Visual Acuity      ED Medications  Medications   Labetalol HCl (NORMODYNE) injection 20 mg (0 mg Intravenous Hold 1/13/19 2047)   ondansetron (ZOFRAN) injection 4 mg (4 mg Intravenous Given 1/13/19 2048)   ketorolac (TORADOL) injection 15 mg (15 mg Intravenous Given 1/13/19 2046)   iohexol (OMNIPAQUE) 350 MG/ML injection (MULTI-DOSE) 100 mL (100 mL Intravenous Given 1/13/19 2111)   dicyclomine (BENTYL) tablet 20 mg (20 mg Oral Given 1/13/19 2200)   ciprofloxacin (CIPRO) tablet 500 mg (500 mg Oral Given 1/13/19 2200)       Diagnostic Studies  Results Reviewed     Procedure Component Value Units Date/Time    Comprehensive metabolic panel [[id number]] (Abnormal) Collected:  01/13/19 2026    Lab Status:  Final result Specimen:  Blood from Arm, Left Updated:  01/13/19 2052     Sodium 140 mmol/L      Potassium 3 0 (L) mmol/L      Chloride 101 mmol/L      CO2 31 mmol/L      ANION GAP 8 mmol/L      BUN 10 mg/dL      Creatinine 0 87 mg/dL      Glucose 237 (H) mg/dL      Calcium 8 5 mg/dL      AST 12 U/L      ALT 25 U/L      Alkaline Phosphatase 116 U/L      Total Protein 7 1 g/dL      Albumin 3 1 (L) g/dL      Total Bilirubin 0 40 mg/dL      eGFR 89 ml/min/1 73sq m     Narrative:         National Kidney Disease Education Program recommendations are as follows:  GFR calculation is accurate only with a steady state creatinine  Chronic Kidney disease less than 60 ml/min/1 73 sq  meters  Kidney failure less than 15 ml/min/1 73 sq  meters      Lipase [[id number]]  (Normal) Collected:  01/13/19 2026    Lab Status:  Final result Specimen:  Blood from Arm, Left Updated:  01/13/19 2052     Lipase 116 u/L     CBC and differential [[id number]]  (Abnormal) Collected:  01/13/19 2026    Lab Status:  Final result Specimen:  Blood from Arm, Left Updated:  01/13/19 2037     WBC 11 19 (H) Thousand/uL      RBC 4 54 Million/uL      Hemoglobin 12 7 g/dL      Hematocrit 39 1 %      MCV 86 fL      MCH 28 0 pg      MCHC 32 5 g/dL      RDW 14 0 %      MPV 10 3 fL      Platelets 017 Thousands/uL      nRBC 0 /100 WBCs      Neutrophils Relative 70 %      Immat GRANS % 0 %      Lymphocytes Relative 22 %      Monocytes Relative 6 %      Eosinophils Relative 2 %      Basophils Relative 0 %      Neutrophils Absolute 7 74 (H) Thousands/µL      Immature Grans Absolute 0 04 Thousand/uL      Lymphocytes Absolute 2 51 Thousands/µL      Monocytes Absolute 0 67 Thousand/µL      Eosinophils Absolute 0 21 Thousand/µL      Basophils Absolute 0 02 Thousands/µL                  CT abdomen pelvis with contrast   Final Result by Keegan Bae MD (01/13 2123)      Nondilated fluid-filled loops of small bowel suggesting an enteritis, most commonly of infectious or inflammatory etiology  Stable 4 3 cm ascending thoracic aortic aneurysm  Workstation performed: [id number]                    Procedures  Procedures       Phone Contacts  ED Phone Contact    ED Course                               MDM  Number of Diagnoses or Management Options  Enteritis: new and requires workup     Amount and/or Complexity of Data Reviewed  Clinical lab tests: ordered and reviewed  Tests in the radiology section of CPT®: ordered and reviewed  Tests in the medicine section of CPT®: reviewed and ordered  Review and summarize past medical records: yes    Patient Progress  Patient progress: improved    CritCare Time    Disposition  Final diagnoses:   Enteritis     Time reflects when diagnosis was documented in both MDM as applicable and the Disposition within this note     Time User Action Codes Description Comment    1/13/2019  9:32 PM Shekhar Quiroz Add [K52 9] Enteritis       ED Disposition     ED Disposition Condition Comment    Discharge  Quentin Red discharge to home/self care  Condition at discharge: Good        Follow-up Information     Follow up With Specialties Details Why Contact Info    Celia Tinsley MD  Schedule an appointment as soon as possible for a visit  [address] [address]  [phone]            Patient's Medications   Discharge Prescriptions    CIPROFLOXACIN (CIPRO) 500 MG TABLET    Take 1 tablet (500 mg total) by mouth 2 (two) times a day for 7 days       Start Date: 1/13/2019 End Date: 1/20/2019       Order Dose: 500 mg       Quantity: 14 tablet    Refills: 0    DICYCLOMINE (BENTYL) 20 MG TABLET    Take 1 tablet (20 mg total) by mouth every 6 (six) hours For crampy abdominal pain       Start Date: 1/13/2019 End Date: --       Order Dose: 20 mg       Quantity: 20 tablet    Refills: 0     No discharge procedures on file      ED Provider  Electronically Signed by           Fran Ellington,   01/13/19 2202

## 2019-01-14 NOTE — DISCHARGE INSTRUCTIONS
Enteritis   WHAT YOU NEED TO KNOW:   Enteritis is inflammation of the small intestine  It may be caused by eating foods or drinking liquids contaminated with a virus, bacteria, or parasites  It may also be caused by certain medicines, damage from radiation, and medical conditions such as Crohn disease  DISCHARGE INSTRUCTIONS:   Return to the emergency department if:   · You cannot stop vomiting  · You have not urinated for 12 hours  Contact your healthcare provider if:   · You have a fever over 101 5  · You have blood or mucus in your bowel movements  · You continue to vomit or have diarrhea for more than 3 days, even after treatment  · You have a dry mouth and eyes, you are urinating less than usual, and you feel dizzy when you stand up  · Your mouth or eyes are dry  You are not urinating as much or as often  · You are losing weight without trying  · You have questions or concerns about your condition or care  Medicines:   · Medicines  may be given to fight an infection caused by bacteria or a parasite  You may also need medicines to slow or stop your diarrhea or vomiting  Do not take these medicines unless your healthcare provider say it is okay  Other medicines may be needed to treat medical conditions that are causing enteritis  · Take your medicine as directed  Contact your healthcare provider if you think your medicine is not helping or if you have side effects  Tell him of her if you are allergic to any medicine  Keep a list of the medicines, vitamins, and herbs you take  Include the amounts, and when and why you take them  Bring the list or the pill bottles to follow-up visits  Carry your medicine list with you in case of an emergency  Manage enteritis:   · Eat foods that help to decrease symptoms  Limit or avoid foods and liquids that are high in sugar, fat, and fiber to help relieve diarrhea  It may be helpful to avoid lactose   Lactose is a type of sugar that is found in milk products  You may be able to tolerate soups, broths, well-cooked vegetables, canned fruit, and baked or broiled meats  Ask your dietitian or healthcare provider if you should follow a special diet  You may need to avoid other foods if you have certain medical conditions such as celiac disease  · Drink liquids as directed  Ask how much liquid to drink each day and which liquids are best for you  It is important to prevent or treat dehydration  Even if you have been vomiting, suck on ice chips or take small sips of clear liquids often  Slowly increase the amount of clear liquids you drink  If you become dehydrated, you may need IV liquids  · Drink an oral rehydration solution (ORS) as directed  An ORS contains water, salts, and sugar that are needed to replace lost body fluids  Ask what kind of ORS to use, how much to drink, and where to get it  Prevent enteritis:  Enteritis that is caused by bacteria, parasites, or viruses can be prevented  The following may help to prevent this type of enteritis:  · Wash your hands often  Use soap and water  Wash your hands after you use the bathroom, change a child's diapers, or sneeze  Wash your hands before you prepare or eat food  · Clean surfaces and do laundry often  Wash your clothes and towels separately from the rest of the laundry  Clean surfaces in your home with antibacterial  or bleach  · Clean food thoroughly and cook safely  Wash raw vegetables before you cook  Cook meat, fish, and eggs fully  Do not use the same dishes for raw meat as you do for other foods  Refrigerate any leftover food immediately  · Be aware when you camp or travel  Drink only clean water  Do not drink from rivers or lakes unless you purify or boil the water first  When you travel, drink bottled water and do not add ice  Do not eat fruit that has not been peeled  Do not eat raw fish or meat that is not fully cooked    Follow up with your healthcare provider as directed:  Write down your questions so you remember to ask them during your visits  © 2017 [address] Information is for End User's use only and may not be sold, redistributed or otherwise used for commercial purposes  All illustrations and images included in CareNotes® are the copyrighted property of [employer]  or Aiden Contreras  The above information is an  only  It is not intended as medical advice for individual conditions or treatments  Talk to your doctor, nurse or pharmacist before following any medical regimen to see if it is safe and effective for you

## 2019-02-06 ENCOUNTER — TRANSCRIBE ORDERS (OUTPATIENT)
Dept: ADMINISTRATIVE | Facility: HOSPITAL | Age: 54
End: 2019-02-06

## 2019-02-06 DIAGNOSIS — M54.16 LUMBAR RADICULOPATHY: Primary | ICD-10-CM

## 2019-02-26 ENCOUNTER — HOSPITAL ENCOUNTER (OUTPATIENT)
Dept: MRI IMAGING | Facility: HOSPITAL | Age: 54
Discharge: HOME | End: 2019-02-26
Payer: COMMERCIAL

## 2019-02-26 DIAGNOSIS — M54.16 LUMBAR RADICULOPATHY: ICD-10-CM

## 2019-02-26 PROCEDURE — 72148 MRI LUMBAR SPINE W/O DYE: CPT

## 2019-03-04 ENCOUNTER — APPOINTMENT (EMERGENCY)
Dept: CT IMAGING | Facility: HOSPITAL | Age: 54
End: 2019-03-04
Payer: COMMERCIAL

## 2019-03-04 ENCOUNTER — HOSPITAL ENCOUNTER (EMERGENCY)
Facility: HOSPITAL | Age: 54
Discharge: HOME | End: 2019-03-04
Attending: EMERGENCY MEDICINE | Admitting: EMERGENCY MEDICINE
Payer: COMMERCIAL

## 2019-03-04 VITALS
SYSTOLIC BLOOD PRESSURE: 180 MMHG | TEMPERATURE: 97.4 F | RESPIRATION RATE: 18 BRPM | WEIGHT: 202.82 LBS | HEART RATE: 88 BPM | OXYGEN SATURATION: 95 % | BODY MASS INDEX: 29.1 KG/M2 | DIASTOLIC BLOOD PRESSURE: 95 MMHG

## 2019-03-04 DIAGNOSIS — R07.89 ATYPICAL CHEST PAIN: ICD-10-CM

## 2019-03-04 DIAGNOSIS — N39.0 UTI (URINARY TRACT INFECTION): Primary | ICD-10-CM

## 2019-03-04 DIAGNOSIS — R51.9 HEADACHE: ICD-10-CM

## 2019-03-04 DIAGNOSIS — R10.9 ABDOMINAL PAIN: ICD-10-CM

## 2019-03-04 LAB
ALBUMIN SERPL BCP-MCNC: 3.3 G/DL (ref 3.5–5)
ALP SERPL-CCNC: 126 U/L (ref 46–116)
ALT SERPL W P-5'-P-CCNC: 22 U/L (ref 12–78)
ANION GAP SERPL CALCULATED.3IONS-SCNC: 8 MMOL/L (ref 4–13)
AST SERPL W P-5'-P-CCNC: 10 U/L (ref 5–45)
BACTERIA UR QL AUTO: ABNORMAL /HPF
BASOPHILS # BLD AUTO: 0.05 THOUSANDS/ΜL (ref 0–0.1)
BASOPHILS NFR BLD AUTO: 1 % (ref 0–1)
BILIRUB SERPL-MCNC: 0.3 MG/DL (ref 0.2–1)
BILIRUB UR QL STRIP: NEGATIVE
BUN SERPL-MCNC: 11 MG/DL (ref 5–25)
CALCIUM SERPL-MCNC: 9.3 MG/DL (ref 8.3–10.1)
CHLORIDE SERPL-SCNC: 103 MMOL/L (ref 100–108)
CLARITY UR: ABNORMAL
CO2 SERPL-SCNC: 30 MMOL/L (ref 21–32)
COLOR UR: YELLOW
CREAT SERPL-MCNC: 0.8 MG/DL (ref 0.6–1.3)
EOSINOPHIL # BLD AUTO: 0.22 THOUSAND/ΜL (ref 0–0.61)
EOSINOPHIL NFR BLD AUTO: 2 % (ref 0–6)
ERYTHROCYTE [DISTWIDTH] IN BLOOD BY AUTOMATED COUNT: 14.4 % (ref 11.6–15.1)
GFR SERPL CREATININE-BSD FRML MDRD: 98 ML/MIN/1.73SQ M
GLUCOSE SERPL-MCNC: 314 MG/DL (ref 65–140)
GLUCOSE UR STRIP-MCNC: ABNORMAL MG/DL
HCT VFR BLD AUTO: 43.3 % (ref 34.8–46.1)
HGB BLD-MCNC: 14.1 G/DL (ref 11.5–15.4)
HGB UR QL STRIP.AUTO: NEGATIVE
IMM GRANULOCYTES # BLD AUTO: 0.04 THOUSAND/UL (ref 0–0.2)
IMM GRANULOCYTES NFR BLD AUTO: 0 % (ref 0–2)
KETONES UR STRIP-MCNC: ABNORMAL MG/DL
LEUKOCYTE ESTERASE UR QL STRIP: ABNORMAL
LIPASE SERPL-CCNC: 181 U/L (ref 73–393)
LYMPHOCYTES # BLD AUTO: 3.66 THOUSANDS/ΜL (ref 0.6–4.47)
LYMPHOCYTES NFR BLD AUTO: 34 % (ref 14–44)
MCH RBC QN AUTO: 28 PG (ref 26.8–34.3)
MCHC RBC AUTO-ENTMCNC: 32.6 G/DL (ref 31.4–37.4)
MCV RBC AUTO: 86 FL (ref 82–98)
MONOCYTES # BLD AUTO: 0.83 THOUSAND/ΜL (ref 0.17–1.22)
MONOCYTES NFR BLD AUTO: 8 % (ref 4–12)
NEUTROPHILS # BLD AUTO: 5.88 THOUSANDS/ΜL (ref 1.85–7.62)
NEUTS SEG NFR BLD AUTO: 55 % (ref 43–75)
NITRITE UR QL STRIP: NEGATIVE
NON-SQ EPI CELLS URNS QL MICRO: ABNORMAL /HPF
NRBC BLD AUTO-RTO: 0 /100 WBCS
PH UR STRIP.AUTO: 6 [PH] (ref 4.5–8)
PLATELET # BLD AUTO: 330 THOUSANDS/UL (ref 149–390)
PMV BLD AUTO: 10.3 FL (ref 8.9–12.7)
POTASSIUM SERPL-SCNC: 3.7 MMOL/L (ref 3.5–5.3)
PROT SERPL-MCNC: 7.5 G/DL (ref 6.4–8.2)
PROT UR STRIP-MCNC: NEGATIVE MG/DL
RBC # BLD AUTO: 5.03 MILLION/UL (ref 3.81–5.12)
RBC #/AREA URNS AUTO: ABNORMAL /HPF
SODIUM SERPL-SCNC: 141 MMOL/L (ref 136–145)
SP GR UR STRIP.AUTO: >=1.03 (ref 1–1.03)
TROPONIN I SERPL-MCNC: <0.02 NG/ML
UROBILINOGEN UR QL STRIP.AUTO: 0.2 E.U./DL
WBC # BLD AUTO: 10.68 THOUSAND/UL (ref 4.31–10.16)
WBC #/AREA URNS AUTO: ABNORMAL /HPF

## 2019-03-04 PROCEDURE — 80053 COMPREHEN METABOLIC PANEL: CPT | Performed by: EMERGENCY MEDICINE

## 2019-03-04 PROCEDURE — 85025 COMPLETE CBC W/AUTO DIFF WBC: CPT | Performed by: EMERGENCY MEDICINE

## 2019-03-04 PROCEDURE — 71275 CT ANGIOGRAPHY CHEST: CPT

## 2019-03-04 PROCEDURE — 36415 COLL VENOUS BLD VENIPUNCTURE: CPT | Performed by: EMERGENCY MEDICINE

## 2019-03-04 PROCEDURE — 84484 ASSAY OF TROPONIN QUANT: CPT | Performed by: EMERGENCY MEDICINE

## 2019-03-04 PROCEDURE — 87086 URINE CULTURE/COLONY COUNT: CPT

## 2019-03-04 PROCEDURE — 70450 CT HEAD/BRAIN W/O DYE: CPT

## 2019-03-04 PROCEDURE — 93005 ELECTROCARDIOGRAM TRACING: CPT

## 2019-03-04 PROCEDURE — 81001 URINALYSIS AUTO W/SCOPE: CPT

## 2019-03-04 PROCEDURE — 87147 CULTURE TYPE IMMUNOLOGIC: CPT

## 2019-03-04 PROCEDURE — 83690 ASSAY OF LIPASE: CPT | Performed by: EMERGENCY MEDICINE

## 2019-03-04 PROCEDURE — 96374 THER/PROPH/DIAG INJ IV PUSH: CPT

## 2019-03-04 PROCEDURE — 74174 CTA ABD&PLVS W/CONTRAST: CPT

## 2019-03-04 PROCEDURE — 96375 TX/PRO/DX INJ NEW DRUG ADDON: CPT

## 2019-03-04 PROCEDURE — 81003 URINALYSIS AUTO W/O SCOPE: CPT

## 2019-03-04 PROCEDURE — 99285 EMERGENCY DEPT VISIT HI MDM: CPT

## 2019-03-04 RX ORDER — HYDROMORPHONE HCL/PF 1 MG/ML
1 SYRINGE (ML) INJECTION ONCE
Status: COMPLETED | OUTPATIENT
Start: 2019-03-04 | End: 2019-03-04

## 2019-03-04 RX ORDER — CEPHALEXIN 500 MG/1
500 CAPSULE ORAL EVERY 6 HOURS SCHEDULED
Qty: 28 CAPSULE | Refills: 0 | Status: SHIPPED | OUTPATIENT
Start: 2019-03-04 | End: 2019-03-11

## 2019-03-04 RX ORDER — CEPHALEXIN 250 MG/1
500 CAPSULE ORAL ONCE
Status: COMPLETED | OUTPATIENT
Start: 2019-03-04 | End: 2019-03-04

## 2019-03-04 RX ORDER — LABETALOL 20 MG/4 ML (5 MG/ML) INTRAVENOUS SYRINGE
10 ONCE
Status: COMPLETED | OUTPATIENT
Start: 2019-03-04 | End: 2019-03-04

## 2019-03-04 RX ADMIN — HYDROMORPHONE HYDROCHLORIDE 1 MG: 1 INJECTION, SOLUTION INTRAMUSCULAR; INTRAVENOUS; SUBCUTANEOUS at 21:34

## 2019-03-04 RX ADMIN — CEPHALEXIN 500 MG: 250 CAPSULE ORAL at 23:19

## 2019-03-04 RX ADMIN — LABETALOL 20 MG/4 ML (5 MG/ML) INTRAVENOUS SYRINGE 10 MG: at 21:24

## 2019-03-04 RX ADMIN — IOHEXOL 100 ML: 350 INJECTION, SOLUTION INTRAVENOUS at 22:32

## 2019-03-05 NOTE — ED PROVIDER NOTES
History  Chief Complaint   Patient presents with    Chest Pain     from home with chest tightness x 2 days  upper Abd discomfort x 2 weeks  52-year-old female comes in with multiple complaints  Patient complains of upper abdominal discomfort like someone is kicking the from the inside as well as chest tightness  The abdominal pain is for 2 weeks and intermittent the chest tightness has been for the last 2 days and has been waxing and waning  Patient also complains of headache abrupt in onset and severe for the past week  Patient states she got an injection in her eye and since that time she has had this severe headache  Patient tried Tylenol at home without relief      History provided by:  Patient  Chest Pain   Pain location:  Substernal area  Pain quality: tightness    Pain radiates to:  Does not radiate  Pain severity:  Moderate  Onset quality:  Gradual  Duration:  2 days  Timing:  Intermittent  Progression:  Waxing and waning  Chronicity:  New  Context: at rest    Ineffective treatments:  None tried  Associated symptoms: abdominal pain    Associated symptoms: no back pain, no cough, no fatigue, no fever, no headache, no numbness, no orthopnea, no palpitations, no shortness of breath and no syncope    Abdominal pain:     Location:  Epigastric    Quality:  Pressure    Severity:  Moderate    Onset quality:  Gradual    Duration:  2 weeks    Timing:  Intermittent    Progression:  Waxing and waning    Chronicity:  New  Risk factors: aortic disease, diabetes mellitus, hypertension and smoking    Risk factors: no prior DVT/PE        Prior to Admission Medications   Prescriptions Last Dose Informant Patient Reported? Taking?    ALPRAZolam (XANAX) 0 5 mg tablet   Yes Yes   Sig: Take 0 25 mg by mouth 2 (two) times a day as needed     Dapagliflozin-Metformin HCl (XIGDUO XR PO)   Yes Yes   Sig: Take 5 mg by mouth daily   Dulaglutide (TRULICITY) 1 5 EF/4 7EF SOPN   Yes Yes   Sig: Inject 1 5 mg under the skin once a week   Glucose Blood (TRUE FOCUS BLOOD GLUCOSE STRIP VI)   Yes Yes   Si Device by Does not apply route 4 (four) times a day   HYDROCHLOROTHIAZIDE PO   Yes Yes   Sig: Take 25 mg by mouth     Insulin Glulisine (APIDRA SOLOSTAR SC)   Yes Yes   Sig: Inject 20 Units under the skin 3 (three) times a day   Insulin Pen Needle (ULTICARE MICRO PEN NEEDLES) 32G X 4 MM MISC   Yes Yes   Si Device by Does not apply route 4 (four) times a day   Insulin Pen Needle (UNIFINE PENTIPS PLUS) 31G X 6 MM MISC   Yes Yes   Si Device by Does not apply route 4 (four) times a day   Lancets MISC   Yes Yes   Si Device by Does not apply route 4 (four) times a day   amLODIPine (NORVASC) 10 mg tablet   Yes Yes   Sig: Take 10 mg by mouth daily   dicyclomine (BENTYL) 20 mg tablet   No Yes   Sig: Take 1 tablet (20 mg total) by mouth every 6 (six) hours For crampy abdominal pain   doxazosin (CARDURA) 4 mg tablet   Yes Yes   Sig: Take 4 mg by mouth daily   escitalopram (LEXAPRO) 10 mg tablet   Yes Yes   Sig: Take 5 mg by mouth daily   hydrALAZINE (APRESOLINE) 25 mg tablet   No Yes   Sig: Take 1 tablet (25 mg total) by mouth every 8 (eight) hours   insulin glargine (LANTUS) 100 units/mL subcutaneous injection   Yes Yes   Sig: Inject 60 Units under the skin daily at bedtime   lisinopril (ZESTRIL) 40 mg tablet   Yes Yes   Sig: Take 40 mg by mouth daily   meclizine (ANTIVERT) 25 mg tablet   No Yes   Sig: Take 1 tablet (25 mg total) by mouth every 8 (eight) hours as needed for dizziness   metoprolol tartrate (LOPRESSOR) 100 mg tablet   No Yes   Sig: Take 1 tablet (100 mg total) by mouth every 12 (twelve) hours   Patient taking differently: Take 100 mg by mouth daily     omeprazole (PriLOSEC) 20 mg delayed release capsule   Yes Yes   Sig: Take 20 mg by mouth daily   ondansetron (ZOFRAN) 4 mg tablet   No Yes   Sig: Take 1 tablet (4 mg total) by mouth 3 (three) times a day as needed for nausea   oxyCODONE-acetaminophen (PERCOCET) 5-325 mg per tablet  Self Yes Yes   Sig: Take 1 tablet by mouth every 4 (four) hours as needed for moderate pain   pantoprazole (PROTONIX) 20 mg tablet   No Yes   Sig: Take 1 tablet (20 mg total) by mouth 2 (two) times a day before meals   pregabalin (LYRICA) 75 mg capsule  Self Yes Yes   Sig: Take 75 mg by mouth 3 (three) times a day   simvastatin (ZOCOR) 10 mg tablet   Yes Yes   Sig: Take 1 tablet by mouth daily      Facility-Administered Medications: None       Past Medical History:   Diagnosis Date    Aortic aneurysm (HCC)     Arthritis     Diabetes mellitus (HCC)     Fibromyalgia     GERD (gastroesophageal reflux disease)     Hyperlipidemia     Hypertension     Psychiatric disorder     anxiety       Past Surgical History:   Procedure Laterality Date    CARPAL TUNNEL RELEASE Left     CHOLECYSTECTOMY      HYSTERECTOMY      OVARIAN CYST REMOVAL      TUBAL LIGATION         Family History   Problem Relation Age of Onset    Hypertension Mother     Diabetes Father     Other Sister         renal cell carcinoma    Diabetes Other      I have reviewed and agree with the history as documented  Social History     Tobacco Use    Smoking status: Current Every Day Smoker     Packs/day: 1 00    Smokeless tobacco: Current User   Substance Use Topics    Alcohol use: No    Drug use: No        Review of Systems   Constitutional: Negative for fatigue and fever  HENT: Negative for congestion and ear pain  Eyes: Negative for discharge and redness  Respiratory: Negative for apnea, cough, shortness of breath and wheezing  Cardiovascular: Positive for chest pain  Negative for palpitations, orthopnea and syncope  Gastrointestinal: Positive for abdominal pain  Negative for diarrhea  Endocrine: Negative for cold intolerance and polydipsia  Genitourinary: Negative for difficulty urinating and hematuria  Musculoskeletal: Negative for arthralgias and back pain  Skin: Negative for color change and rash  Allergic/Immunologic: Negative for environmental allergies and immunocompromised state  Neurological: Negative for numbness and headaches  Hematological: Negative for adenopathy  Does not bruise/bleed easily  Psychiatric/Behavioral: Negative for agitation and behavioral problems  Physical Exam  Physical Exam   Constitutional: She is oriented to person, place, and time  Vital signs are normal  She appears well-developed and well-nourished  Non-toxic appearance  HENT:   Head: Normocephalic and atraumatic  Right Ear: Tympanic membrane and external ear normal    Left Ear: Tympanic membrane and external ear normal    Nose: Nose normal  No rhinorrhea, sinus tenderness or nasal deformity  Mouth/Throat: Uvula is midline and oropharynx is clear and moist  Normal dentition  Eyes: Pupils are equal, round, and reactive to light  Conjunctivae, EOM and lids are normal  Right eye exhibits no discharge  Left eye exhibits no discharge  Neck: Trachea normal and normal range of motion  Neck supple  No JVD present  Carotid bruit is not present  Cardiovascular: Normal rate, regular rhythm, intact distal pulses and normal pulses  No extrasystoles are present  PMI is not displaced  Pulmonary/Chest: Effort normal and breath sounds normal  No accessory muscle usage  No respiratory distress  She has no wheezes  She has no rhonchi  She has no rales  Abdominal: Soft  Normal appearance and bowel sounds are normal  She exhibits no mass  There is tenderness in the epigastric area  There is no rigidity, no rebound and no guarding  Musculoskeletal:        Right shoulder: She exhibits normal range of motion, no bony tenderness, no swelling and no deformity  Cervical back: Normal  She exhibits normal range of motion, no tenderness, no bony tenderness and no deformity  Lymphadenopathy:     She has no cervical adenopathy  She has no axillary adenopathy     Neurological: She is alert and oriented to person, place, and time  She has normal strength and normal reflexes  No cranial nerve deficit or sensory deficit  GCS eye subscore is 4  GCS verbal subscore is 5  GCS motor subscore is 6  Skin: Skin is warm and dry  No rash noted  Psychiatric: She has a normal mood and affect  Her speech is normal and behavior is normal    Nursing note and vitals reviewed        Vital Signs  ED Triage Vitals   Temperature Pulse Respirations Blood Pressure SpO2   03/04/19 2100 03/04/19 2100 03/04/19 2100 03/04/19 2100 03/04/19 2100   (!) 97 4 °F (36 3 °C) 90 18 (!) 173/91 98 %      Temp Source Heart Rate Source Patient Position - Orthostatic VS BP Location FiO2 (%)   03/04/19 2100 -- -- 03/04/19 2100 --   Oral   Right arm       Pain Score       03/04/19 2134       7           Vitals:    03/04/19 2200 03/04/19 2215 03/04/19 2245 03/04/19 2300   BP:  165/91  (!) 180/95   Pulse: 88 88 88 88       Visual Acuity      ED Medications  Medications   cephalexin (KEFLEX) capsule 500 mg (has no administration in time range)   Labetalol HCl (NORMODYNE) injection 10 mg (10 mg Intravenous Given 3/4/19 2124)   HYDROmorphone (DILAUDID) injection 1 mg (1 mg Intravenous Given 3/4/19 2134)   iohexol (OMNIPAQUE) 350 MG/ML injection (MULTI-DOSE) 100 mL (100 mL Intravenous Given 3/4/19 2232)       Diagnostic Studies  Results Reviewed     Procedure Component Value Units Date/Time    Troponin I [[id number]]  (Normal) Collected:  03/04/19 2122    Lab Status:  Final result Specimen:  Blood from Arm, Left Updated:  03/04/19 2152     Troponin I <0 02 ng/mL     Comprehensive metabolic panel [[id number]]  (Abnormal) Collected:  03/04/19 2122    Lab Status:  Final result Specimen:  Blood from Arm, Left Updated:  03/04/19 2150     Sodium 141 mmol/L      Potassium 3 7 mmol/L      Chloride 103 mmol/L      CO2 30 mmol/L      ANION GAP 8 mmol/L      BUN 11 mg/dL      Creatinine 0 80 mg/dL      Glucose 314 mg/dL      Calcium 9 3 mg/dL      AST 10 U/L      ALT 22 U/L Alkaline Phosphatase 126 U/L      Total Protein 7 5 g/dL      Albumin 3 3 g/dL      Total Bilirubin 0 30 mg/dL      eGFR 98 ml/min/1 73sq m     Narrative:       National Kidney Disease Education Program recommendations are as follows:  GFR calculation is accurate only with a steady state creatinine  Chronic Kidney disease less than 60 ml/min/1 73 sq  meters  Kidney failure less than 15 ml/min/1 73 sq  meters  Lipase [[id number]]  (Normal) Collected:  03/04/19 2122    Lab Status:  Final result Specimen:  Blood from Arm, Left Updated:  03/04/19 2150     Lipase 181 u/L     Urine Microscopic [[id number]]  (Abnormal) Collected:  03/04/19 2140    Lab Status:  Final result Specimen:  Urine, Clean Catch Updated:  03/04/19 2149     RBC, UA Innumerable /hpf      WBC, UA Innumerable /hpf      Epithelial Cells Moderate /hpf      Bacteria, UA Innumerable /hpf     Urine culture [[id number]] Collected:  03/04/19 2140    Lab Status:   In process Specimen:  Urine, Clean Catch Updated:  03/04/19 2149    ED Urine Macroscopic [[id number]]  (Abnormal) Collected:  03/04/19 2140    Lab Status:  Final result Specimen:  Urine Updated:  03/04/19 2136     Color, UA Yellow     Clarity, UA Cloudy     pH, UA 6 0     Leukocytes, UA Small     Nitrite, UA Negative     Protein, UA Negative mg/dl      Glucose, UA >=1000 (1%) mg/dl      Ketones, UA Trace mg/dl      Urobilinogen, UA 0 2 E U /dl      Bilirubin, UA Negative     Blood, UA Negative     Specific Gravity, UA >=1 030    Narrative:       CLINITEK RESULT    CBC and differential [[id number]]  (Abnormal) Collected:  03/04/19 2122    Lab Status:  Final result Specimen:  Blood from Arm, Left Updated:  03/04/19 2133     WBC 10 68 Thousand/uL      RBC 5 03 Million/uL      Hemoglobin 14 1 g/dL      Hematocrit 43 3 %      MCV 86 fL      MCH 28 0 pg      MCHC 32 6 g/dL      RDW 14 4 %      MPV 10 3 fL      Platelets 487 Thousands/uL      nRBC 0 /100 WBCs      Neutrophils Relative 55 %      Immat GRANS % 0 % Lymphocytes Relative 34 %      Monocytes Relative 8 %      Eosinophils Relative 2 %      Basophils Relative 1 %      Neutrophils Absolute 5 88 Thousands/µL      Immature Grans Absolute 0 04 Thousand/uL      Lymphocytes Absolute 3 66 Thousands/µL      Monocytes Absolute 0 83 Thousand/µL      Eosinophils Absolute 0 22 Thousand/µL      Basophils Absolute 0 05 Thousands/µL                  CTA dissection protocol chest abdomen pelvis w wo contrast   Final Result by Faye Kapadia MD (03/04 5829)      Stable aneurysmal dilatation of ascending aorta up to 4 2 cm  No evidence of dissection  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  Increased findings of lung bullae and paraseptal emphysema  Workstation performed: [id number]         CT head without contrast   Final Result by Maggi Bustamante DO (03/04 5016)      No acute intracranial abnormality                    Workstation performed: [id number]                    Procedures  ECG 12 Lead Documentation  Date/Time: 3/4/2019 9:21 PM  Performed by: Nadine Zuniga DO  Authorized by: Nadine Zuniga DO     Patient location:  ED  Rate:     ECG rate:  89  Rhythm:     Rhythm: sinus rhythm    Ectopy:     Ectopy: none    QRS:     QRS axis:  Normal  Conduction:     Conduction: normal    ST segments:     ST segments:  Normal           Phone Contacts  ED Phone Contact    ED Course                               MDM  Number of Diagnoses or Management Options  Abdominal pain: new and requires workup  Atypical chest pain: new and requires workup  Headache: new and requires workup  UTI (urinary tract infection): new and requires workup     Amount and/or Complexity of Data Reviewed  Clinical lab tests: ordered and reviewed  Tests in the radiology section of CPT®: reviewed and ordered  Tests in the medicine section of CPT®: ordered and reviewed  Review and summarize past medical records: yes  Independent visualization of images, tracings, or specimens: yes    Patient Progress  Patient progress: stable      Disposition  Final diagnoses:   Atypical chest pain   Abdominal pain   UTI (urinary tract infection)   Headache     Time reflects when diagnosis was documented in both MDM as applicable and the Disposition within this note     Time User Action Codes Description Comment    3/4/2019 11:10 PM Dot Simper K Add [R07 89] Atypical chest pain     3/4/2019 11:10 PM TurRehana dias K Add [R10 9] Abdominal pain     3/4/2019 11:10 PM TurRehana dias K Add [N39 0] UTI (urinary tract infection)     3/4/2019 11:11 PM Turock, Nathaneil Chiu K Add [R51] Headache     3/4/2019 11:11 PM Dot Simper K Modify [R07 89] Atypical chest pain     3/4/2019 11:11 PM Author Few Modify [N39 0] UTI (urinary tract infection)       ED Disposition     ED Disposition Condition Date/Time Comment    Discharge Stable Mon Mar 4, 2019 11:10 PM Alec Adair discharge to home/self care  Follow-up Information     Follow up With Specialties Details Why Contact Info    Matias Medel MD    [address] [place] [address]  [phone]            Patient's Medications   Discharge Prescriptions    CEPHALEXIN (KEFLEX) 500 MG CAPSULE    Take 1 capsule (500 mg total) by mouth every 6 (six) hours for 7 days       Start Date: 3/4/2019  End Date: 3/11/2019       Order Dose: 500 mg       Quantity: 28 capsule    Refills: 0     No discharge procedures on file      ED Provider  Electronically Signed by           Agustina De La Paz DO  03/04/19 9498

## 2019-03-05 NOTE — ED NOTES
Patient transported to [address] [place] [address], [address] [place] [address]  03/04/19 7001

## 2019-03-05 NOTE — ED NOTES
Patient ambulating to bathroom to provide urine sample  Urine specimen container provided to patient       Diana Castro RN  03/04/19 8950

## 2019-03-06 LAB
ATRIAL RATE: 89 BPM
P AXIS: 28 DEGREES
PR INTERVAL: 188 MS
QRS AXIS: -47 DEGREES
QRSD INTERVAL: 102 MS
QT INTERVAL: 386 MS
QTC INTERVAL: 469 MS
T WAVE AXIS: 2 DEGREES
VENTRICULAR RATE: 89 BPM

## 2019-03-06 PROCEDURE — 93010 ELECTROCARDIOGRAM REPORT: CPT | Performed by: INTERNAL MEDICINE

## 2019-03-07 LAB
BACTERIA UR CULT: ABNORMAL
BACTERIA UR CULT: ABNORMAL

## 2019-06-04 ENCOUNTER — OFFICE VISIT (OUTPATIENT)
Dept: CARDIAC SURGERY | Facility: CLINIC | Age: 54
End: 2019-06-04
Payer: COMMERCIAL

## 2019-06-04 VITALS
HEART RATE: 75 BPM | DIASTOLIC BLOOD PRESSURE: 102 MMHG | BODY MASS INDEX: 29.49 KG/M2 | OXYGEN SATURATION: 98 % | RESPIRATION RATE: 14 BRPM | TEMPERATURE: 96.7 F | HEIGHT: 70 IN | WEIGHT: 206 LBS | SYSTOLIC BLOOD PRESSURE: 188 MMHG

## 2019-06-04 DIAGNOSIS — I71.2 THORACIC AORTIC ANEURYSM WITHOUT RUPTURE (HCC): Primary | ICD-10-CM

## 2019-06-04 PROCEDURE — 99244 OFF/OP CNSLTJ NEW/EST MOD 40: CPT | Performed by: NURSE PRACTITIONER

## 2019-07-02 ENCOUNTER — OFFICE VISIT (OUTPATIENT)
Dept: GASTROENTEROLOGY | Facility: AMBULARY SURGERY CENTER | Age: 54
End: 2019-07-02
Payer: COMMERCIAL

## 2019-07-02 VITALS
DIASTOLIC BLOOD PRESSURE: 88 MMHG | SYSTOLIC BLOOD PRESSURE: 160 MMHG | WEIGHT: 201.8 LBS | RESPIRATION RATE: 18 BRPM | TEMPERATURE: 97 F | HEIGHT: 70 IN | BODY MASS INDEX: 28.89 KG/M2 | HEART RATE: 97 BPM

## 2019-07-02 DIAGNOSIS — R10.13 EPIGASTRIC PAIN: ICD-10-CM

## 2019-07-02 DIAGNOSIS — K21.00 GASTROESOPHAGEAL REFLUX DISEASE WITH ESOPHAGITIS: Primary | ICD-10-CM

## 2019-07-02 DIAGNOSIS — K52.9 ENTERITIS: ICD-10-CM

## 2019-07-02 PROCEDURE — 99244 OFF/OP CNSLTJ NEW/EST MOD 40: CPT | Performed by: INTERNAL MEDICINE

## 2019-07-02 RX ORDER — DICYCLOMINE HCL 20 MG
20 TABLET ORAL EVERY 8 HOURS
Qty: 90 TABLET | Refills: 3 | Status: SHIPPED | OUTPATIENT
Start: 2019-07-02 | End: 2020-02-03

## 2019-07-02 RX ORDER — PANTOPRAZOLE SODIUM 20 MG/1
40 TABLET, DELAYED RELEASE ORAL DAILY
Qty: 30 TABLET | Refills: 3 | Status: SHIPPED | OUTPATIENT
Start: 2019-07-02 | End: 2021-05-17 | Stop reason: SDUPTHER

## 2019-07-02 NOTE — PROGRESS NOTES
Consultation - [facility]  Cassandra Doyle 49 y o  female MRN: [id number]          Assessment & Plan:    21-year-old female with poorly controlled diabetes, recent admission at outside hospital where she apparently had an endoscopic workup but continues to have epigastric discomfort, decreased appetite weight loss  1  Epigastric pain with decreased appetite and weight loss:  Unclear etiology, has had extensive evaluation at outside hospital  -we will try to obtain her outside hospital records including endoscopic evaluation and imaging studies  -given poorly controlled diabetes, cup she may have underlying delayed gastric emptying  -we will check a gastric emptying scan  -continue with daily PPI therapy  -small frequent meals  -she may also have a completed IBS  -recommended trial of dicyclomine    Will see the patient back in 2 months time and contact her once we receive the results of her gastric emptying scan       _____________________________________________________________        CC:  Epigastric discomfort    HPI:  Cassandra Doyle is a 49 y  o female who was referred for evaluation of epigastric discomfort and alternating diarrhea and constipation  As you know this is a 21-year-old female with diabetes, hyperlipidemia, who reports a longstanding history of alternating diarrhea and constipation which she has had for many years  More recently she complains of a vague epigastric fluttering sensation  She had recently been admitted to an outside hospital where she had apparently an upper endoscopy and colonoscopy which she reports was relatively unremarkable  Patient reports she has had decreased appetite, she has become a very picky eater and lost approximately 20 lb  She has been taking omeprazole and over-the-counter antacids  With only minimal improvement in her symptoms  She has poorly controlled diabetes with an A1c of almost 11  She denies any melena or rectal bleeding  She reports occasional nausea but no vomiting  Denies any dysphagia  Patient is actually very poor historian was very difficult to ascertain a very accurate history  Past surgical history is notable for cholecystectomy, history of daily,   Patient smokes about a pack a day, denies any alcohol or drugs  Family history is notable for sister with kidney cancer  ROS:  The remainder of the ROS was negative except for the pertinent positives mentioned in HPI  Allergies: Patient has no known allergies      Medications:   Current Outpatient Medications:     ALPRAZolam (XANAX) 0 5 mg tablet, Take 0 25 mg by mouth 2 (two) times a day as needed  , Disp: , Rfl:     amLODIPine (NORVASC) 10 mg tablet, Take 10 mg by mouth daily, Disp: , Rfl:     Dapagliflozin-Metformin HCl (XIGDUO XR PO), Take 5 mg by mouth daily, Disp: , Rfl:     dicyclomine (BENTYL) 20 mg tablet, Take 1 tablet (20 mg total) by mouth every 8 (eight) hours For crampy abdominal pain, Disp: 90 tablet, Rfl: 3    doxazosin (CARDURA) 4 mg tablet, Take 4 mg by mouth daily, Disp: , Rfl:     Dulaglutide (TRULICITY) 1 5 PF/3 0RL SOPN, Inject 1 5 mg under the skin once a week, Disp: , Rfl:     escitalopram (LEXAPRO) 10 mg tablet, Take 5 mg by mouth daily, Disp: , Rfl:     Glucose Blood (TRUE FOCUS BLOOD GLUCOSE STRIP VI), 1 Device by Does not apply route 4 (four) times a day, Disp: , Rfl:     hydrALAZINE (APRESOLINE) 25 mg tablet, Take 1 tablet (25 mg total) by mouth every 8 (eight) hours, Disp: 90 tablet, Rfl: 0    HYDROCHLOROTHIAZIDE PO, Take 25 mg by mouth  , Disp: , Rfl:     insulin glargine (LANTUS) 100 units/mL subcutaneous injection, Inject 60 Units under the skin daily at bedtime, Disp: , Rfl:     Insulin Glulisine (APIDRA SOLOSTAR SC), Inject 20 Units under the skin 3 (three) times a day, Disp: , Rfl:     Insulin Pen Needle (ULTICARE MICRO PEN NEEDLES) 32G X 4 MM MISC, 1 Device by Does not apply route 4 (four) times a day, Disp: , Rfl:     Insulin Pen Needle (UNIFINE PENTIPS PLUS) 31G X 6 MM MISC, 1 Device by Does not apply route 4 (four) times a day, Disp: , Rfl:     Lancets MISC, 1 Device by Does not apply route 4 (four) times a day, Disp: , Rfl:     lisinopril (ZESTRIL) 40 mg tablet, Take 40 mg by mouth daily, Disp: , Rfl:     meclizine (ANTIVERT) 25 mg tablet, Take 1 tablet (25 mg total) by mouth every 8 (eight) hours as needed for dizziness, Disp: 30 tablet, Rfl: 0    metoprolol tartrate (LOPRESSOR) 100 mg tablet, Take 1 tablet (100 mg total) by mouth every 12 (twelve) hours (Patient taking differently: Take 100 mg by mouth daily  ), Disp: 60 tablet, Rfl: 0    ondansetron (ZOFRAN) 4 mg tablet, Take 1 tablet (4 mg total) by mouth 3 (three) times a day as needed for nausea, Disp: 20 tablet, Rfl: 0    oxyCODONE-acetaminophen (PERCOCET) 5-325 mg per tablet, Take 1 tablet by mouth every 4 (four) hours as needed for moderate pain, Disp: , Rfl:     pantoprazole (PROTONIX) 20 mg tablet, Take 2 tablets (40 mg total) by mouth daily, Disp: 30 tablet, Rfl: 3    pregabalin (LYRICA) 75 mg capsule, Take 75 mg by mouth 3 (three) times a day, Disp: , Rfl:     simvastatin (ZOCOR) 10 mg tablet, Take 1 tablet by mouth daily, Disp: , Rfl: '    Past Medical History:   Diagnosis Date    Aortic aneurysm ([facility] [address] )     Arthritis     Diabetes mellitus ([facility] )     Fibromyalgia     GERD (gastroesophageal reflux disease)     Hyperlipidemia     Hypertension     Psychiatric disorder     anxiety       Past Surgical History:   Procedure Laterality Date    CARPAL TUNNEL RELEASE Left     CHOLECYSTECTOMY      HYSTERECTOMY      OVARIAN CYST REMOVAL      TUBAL LIGATION         Family History   Problem Relation Age of Onset    Hypertension Mother     Diabetes Father     Other Sister         renal cell carcinoma    Diabetes Other         reports that she has been smoking  She has been smoking about 1 00 pack per day   She uses smokeless tobacco  She reports that she does not drink alcohol or use drugs            Physical Exam:     /88 (BP Location: Right arm, Patient Position: Sitting, Cuff Size: Standard)   Pulse 97   Temp (!) 97 °F (36 1 °C)   Resp 18   Ht 5' 10" (1 778 m)   Wt 91 5 kg (201 lb 12 8 oz)   BMI 28 96 kg/m²     Gen: wn/wd, NAD  HEENT: anicteric, MMM, no cervical LAD  CVS: RRR, no m/r/g  CHEST: CTA b/l  ABD: +BS, soft, NT,ND, no hepatosplenomegaly  EXT: no c/c/e  NEURO: aaox3  SKIN: NO rashes

## 2019-07-02 NOTE — LETTER
July 2, 2019     Patricia Colorado MD  [address] [id number]    Patient: Azar Daniel   YOB: 1965   Date of Visit: 7/2/2019       Dear Dr Gely Becerra: Thank you for referring Chaz Marques to me for evaluation  Below are my notes for this consultation  If you have questions, please do not hesitate to call me  I look forward to following your patient along with you  Sincerely,        Tanesha Nicole MD        CC: No Recipients  Tanesha Nicole MD  7/2/2019  5:47 PM  Sign at close encounter  Consultation - [facility]  Azar Daniel 49 y o  female MRN: [id number]          Assessment & Plan:    52-year-old female with poorly controlled diabetes, recent admission at outside hospital where she apparently had an endoscopic workup but continues to have epigastric discomfort, decreased appetite weight loss  1  Epigastric pain with decreased appetite and weight loss:  Unclear etiology, has had extensive evaluation at outside hospital  -we will try to obtain her outside hospital records including endoscopic evaluation and imaging studies  -given poorly controlled diabetes, cup she may have underlying delayed gastric emptying  -we will check a gastric emptying scan  -continue with daily PPI therapy  -small frequent meals  -she may also have a completed IBS  -recommended trial of dicyclomine    Will see the patient back in 2 months time and contact her once we receive the results of her gastric emptying scan       _____________________________________________________________        CC:  Epigastric discomfort    HPI:  Azar Daniel is a 49 y  o female who was referred for evaluation of epigastric discomfort and alternating diarrhea and constipation  As you know this is a 52-year-old female with diabetes, hyperlipidemia, who reports a longstanding history of alternating diarrhea and constipation which she has had for many years    More recently she complains of a vague epigastric fluttering sensation  She had recently been admitted to an outside hospital where she had apparently an upper endoscopy and colonoscopy which she reports was relatively unremarkable  Patient reports she has had decreased appetite, she has become a very picky eater and lost approximately 20 lb  She has been taking omeprazole and over-the-counter antacids  With only minimal improvement in her symptoms  She has poorly controlled diabetes with an A1c of almost 11  She denies any melena or rectal bleeding  She reports occasional nausea but no vomiting  Denies any dysphagia  Patient is actually very poor historian was very difficult to ascertain a very accurate history  Past surgical history is notable for cholecystectomy, history of daily,   Patient smokes about a pack a day, denies any alcohol or drugs  Family history is notable for sister with kidney cancer  ROS:  The remainder of the ROS was negative except for the pertinent positives mentioned in HPI  Allergies: Patient has no known allergies      Medications:   Current Outpatient Medications:     ALPRAZolam (XANAX) 0 5 mg tablet, Take 0 25 mg by mouth 2 (two) times a day as needed  , Disp: , Rfl:     amLODIPine (NORVASC) 10 mg tablet, Take 10 mg by mouth daily, Disp: , Rfl:     Dapagliflozin-Metformin HCl (XIGDUO XR PO), Take 5 mg by mouth daily, Disp: , Rfl:     dicyclomine (BENTYL) 20 mg tablet, Take 1 tablet (20 mg total) by mouth every 8 (eight) hours For crampy abdominal pain, Disp: 90 tablet, Rfl: 3    doxazosin (CARDURA) 4 mg tablet, Take 4 mg by mouth daily, Disp: , Rfl:     Dulaglutide (TRULICITY) 1 5 OR/7 7HB SOPN, Inject 1 5 mg under the skin once a week, Disp: , Rfl:     escitalopram (LEXAPRO) 10 mg tablet, Take 5 mg by mouth daily, Disp: , Rfl:     Glucose Blood (TRUE FOCUS BLOOD GLUCOSE STRIP VI), 1 Device by Does not apply route 4 (four) times a day, Disp: , Rfl:    hydrALAZINE (APRESOLINE) 25 mg tablet, Take 1 tablet (25 mg total) by mouth every 8 (eight) hours, Disp: 90 tablet, Rfl: 0    HYDROCHLOROTHIAZIDE PO, Take 25 mg by mouth  , Disp: , Rfl:     insulin glargine (LANTUS) 100 units/mL subcutaneous injection, Inject 60 Units under the skin daily at bedtime, Disp: , Rfl:     Insulin Glulisine (APIDRA SOLOSTAR SC), Inject 20 Units under the skin 3 (three) times a day, Disp: , Rfl:     Insulin Pen Needle (ULTICARE MICRO PEN NEEDLES) 32G X 4 MM MISC, 1 Device by Does not apply route 4 (four) times a day, Disp: , Rfl:     Insulin Pen Needle (UNIFINE PENTIPS PLUS) 31G X 6 MM MISC, 1 Device by Does not apply route 4 (four) times a day, Disp: , Rfl:     Lancets MISC, 1 Device by Does not apply route 4 (four) times a day, Disp: , Rfl:     lisinopril (ZESTRIL) 40 mg tablet, Take 40 mg by mouth daily, Disp: , Rfl:     meclizine (ANTIVERT) 25 mg tablet, Take 1 tablet (25 mg total) by mouth every 8 (eight) hours as needed for dizziness, Disp: 30 tablet, Rfl: 0    metoprolol tartrate (LOPRESSOR) 100 mg tablet, Take 1 tablet (100 mg total) by mouth every 12 (twelve) hours (Patient taking differently: Take 100 mg by mouth daily  ), Disp: 60 tablet, Rfl: 0    ondansetron (ZOFRAN) 4 mg tablet, Take 1 tablet (4 mg total) by mouth 3 (three) times a day as needed for nausea, Disp: 20 tablet, Rfl: 0    oxyCODONE-acetaminophen (PERCOCET) 5-325 mg per tablet, Take 1 tablet by mouth every 4 (four) hours as needed for moderate pain, Disp: , Rfl:     pantoprazole (PROTONIX) 20 mg tablet, Take 2 tablets (40 mg total) by mouth daily, Disp: 30 tablet, Rfl: 3    pregabalin (LYRICA) 75 mg capsule, Take 75 mg by mouth 3 (three) times a day, Disp: , Rfl:     simvastatin (ZOCOR) 10 mg tablet, Take 1 tablet by mouth daily, Disp: , Rfl: '    Past Medical History:   Diagnosis Date    Aortic aneurysm (HCC)     Arthritis     Diabetes mellitus (HCC)     Fibromyalgia     GERD (gastroesophageal reflux disease)     Hyperlipidemia     Hypertension     Psychiatric disorder     anxiety       Past Surgical History:   Procedure Laterality Date    CARPAL TUNNEL RELEASE Left     CHOLECYSTECTOMY      HYSTERECTOMY      OVARIAN CYST REMOVAL      TUBAL LIGATION         Family History   Problem Relation Age of Onset    Hypertension Mother     Diabetes Father     Other Sister         renal cell carcinoma    Diabetes Other         reports that she has been smoking  She has been smoking about 1 00 pack per day  She uses smokeless tobacco  She reports that she does not drink alcohol or use drugs            Physical Exam:     /88 (BP Location: Right arm, Patient Position: Sitting, Cuff Size: Standard)   Pulse 97   Temp (!) 97 °F (36 1 °C)   Resp 18   Ht 5' 10" (1 778 m)   Wt 91 5 kg (201 lb 12 8 oz)   BMI 28 96 kg/m²      Gen: wn/wd, NAD  HEENT: anicteric, MMM, no cervical LAD  CVS: RRR, no m/r/g  CHEST: CTA b/l  ABD: +BS, soft, NT,ND, no hepatosplenomegaly  EXT: no c/c/e  NEURO: aaox3  SKIN: NO rashes

## 2019-08-13 ENCOUNTER — HOSPITAL ENCOUNTER (OUTPATIENT)
Dept: RADIOLOGY | Facility: HOSPITAL | Age: 54
Discharge: HOME | End: 2019-08-13
Attending: INTERNAL MEDICINE
Payer: COMMERCIAL

## 2019-08-13 DIAGNOSIS — K21.00 GASTROESOPHAGEAL REFLUX DISEASE WITH ESOPHAGITIS: ICD-10-CM

## 2019-08-13 DIAGNOSIS — R10.13 EPIGASTRIC PAIN: ICD-10-CM

## 2019-08-13 PROCEDURE — A9541 TC99M SULFUR COLLOID: HCPCS

## 2019-08-13 PROCEDURE — 78264 GASTRIC EMPTYING IMG STUDY: CPT

## 2019-08-19 ENCOUNTER — TELEPHONE (OUTPATIENT)
Dept: GASTROENTEROLOGY | Facility: AMBULARY SURGERY CENTER | Age: 54
End: 2019-08-19

## 2019-08-19 NOTE — TELEPHONE ENCOUNTER
Spoke to pt pt aware of results  Pt stated that she had all studies done while at [facility] they will fax over records that they have

## 2019-08-19 NOTE — TELEPHONE ENCOUNTER
----- Message from Estrella Aquino MD sent at 8/19/2019 11:11 AM EDT -----  Please inform the patient that her recent gastric emptying scan was normal     The patient states that she had an extensive workup at another hospital with upper endoscopy and colonoscopy  Could you please try to obtain these results  I am not sure for which institution she had the studies completed

## 2019-10-08 LAB — HBA1C MFR BLD HPLC: 9.4 %

## 2019-12-20 ENCOUNTER — TRANSCRIBE ORDERS (OUTPATIENT)
Dept: ADMINISTRATIVE | Facility: HOSPITAL | Age: 54
End: 2019-12-20

## 2019-12-20 DIAGNOSIS — M54.16 LUMBAR RADICULOPATHY: Primary | ICD-10-CM

## 2020-01-20 LAB — HBA1C MFR BLD HPLC: 11.7 %

## 2020-02-01 NOTE — PROGRESS NOTES
Assessment/Plan:    No problem-specific Assessment & Plan notes found for this encounter  Diagnoses and all orders for this visit:    Recurrent vaginitis  -     metroNIDAZOLE (FLAGYL) 500 mg tablet; Take 1 tablet (500 mg total) by mouth every 12 (twelve) hours for 5 days  -     Genital Comprehensive Culture    Urinary tract infection with hematuria, site unspecified  -     Urine culture  -     nitrofurantoin (MACROBID) 100 mg capsule; Take 1 capsule (100 mg total) by mouth 2 (two) times a day    Other orders  -     oxyCODONE (ROXICODONE) 10 MG TABS; oxycodone 5 mg tablet  -     cyclobenzaprine (FLEXERIL) 5 mg tablet; cyclobenzaprine 5 mg tablet  -     Dexamethasone (OZURDEX) 0 7 MG IMPL; Ozurdex 0 7 mg intravitreal implant  -     fenofibrate (TRICOR) 145 mg tablet; fenofibrate nanocrystallized 145 mg tablet  -     ibuprofen (MOTRIN) 800 mg tablet; ibuprofen 800 mg tablet  -     insulin lispro (ADMELOG SOLOSTAR) 100 units/mL injection pen; Admelog SoloStar U-100 Insulin lispro 100 unit/mL subcutaneous pen  -     metFORMIN (GLUCOPHAGE) 1000 MG tablet; metformin 1,000 mg tablet  -     metoprolol succinate (TOPROL-XL) 200 MG 24 hr tablet; metoprolol succinate  mg tablet,extended release 24 hr  -     pioglitazone (ACTOS) 45 mg tablet; pioglitazone 45 mg tablet  -     ranibizumab (LUCENTIS) 0 3 mg/0 05mL; Lucentis 0 3 mg/0 05 mL intravitreal solution for injection  -     spironolactone (ALDACTONE) 25 mg tablet; spironolactone 25 mg tablet  -     zolpidem (AMBIEN) 10 mg tablet; zolpidem 10 mg tablet          Subjective:      Patient ID: Fermin Donaldson is a 49 y o  female  Pt for vaginal concerns  Pt noted to have vaginal bleeding that began on Friday, then lasted through Saturday  Pt had had d/c since approx 2 years ago  D/c does occasionally have odor  No itching or irritation routinely but has been irritated more recently  Has treated previously evelyn Lambert without relief  Also used vaginal suppositories  Pt also w increased odor in urine more recently  Does also complain of frequency, no dysuria + urgency  Not sexually active, last active 2 years ago  Under a lot of stress going through divorce  Pt with multiple medical issues that she is currently receiving treatment for  S/p hysterectomy 2001 with questionable bso as well  Pt was on HRT for some time  The following portions of the patient's history were reviewed and updated as appropriate: current medications, past family history, past medical history, past social history, past surgical history and problem list     Review of Systems   Constitutional: Positive for unexpected weight change  Respiratory: Positive for cough (pt 1 ppd smoker, advise dimportance of cessation) and chest tightness  Gastrointestinal: Negative  Endocrine: Negative  Genitourinary: Positive for frequency, vaginal discharge and vaginal pain  Musculoskeletal: Positive for arthralgias and back pain  Neurological: Positive for dizziness  Hematological: Bruises/bleeds easily  Psychiatric/Behavioral: Positive for decreased concentration  The patient is nervous/anxious            Objective:      /98 (BP Location: Left arm, Patient Position: Sitting, Cuff Size: Standard)   Ht 5' 10" (1 778 m)   Wt 95 9 kg (211 lb 6 4 oz)   BMI 30 33 kg/m²          Physical Exam

## 2020-02-03 ENCOUNTER — OFFICE VISIT (OUTPATIENT)
Dept: OBGYN CLINIC | Facility: CLINIC | Age: 55
End: 2020-02-03
Payer: COMMERCIAL

## 2020-02-03 VITALS
WEIGHT: 211.4 LBS | SYSTOLIC BLOOD PRESSURE: 162 MMHG | HEIGHT: 70 IN | DIASTOLIC BLOOD PRESSURE: 98 MMHG | BODY MASS INDEX: 30.26 KG/M2

## 2020-02-03 DIAGNOSIS — N76.0 RECURRENT VAGINITIS: Primary | ICD-10-CM

## 2020-02-03 DIAGNOSIS — N39.0 URINARY TRACT INFECTION WITH HEMATURIA, SITE UNSPECIFIED: ICD-10-CM

## 2020-02-03 DIAGNOSIS — R31.9 URINARY TRACT INFECTION WITH HEMATURIA, SITE UNSPECIFIED: ICD-10-CM

## 2020-02-03 LAB
CLUE CELLS SPEC QL WET PREP: YES
SL AMB POCT WET MOUNT: ABNORMAL
T VAGINALIS VAG QL WET PREP: NO
YEAST VAG QL WET PREP: NO

## 2020-02-03 PROCEDURE — 87077 CULTURE AEROBIC IDENTIFY: CPT | Performed by: PHYSICIAN ASSISTANT

## 2020-02-03 PROCEDURE — 99214 OFFICE O/P EST MOD 30 MIN: CPT | Performed by: PHYSICIAN ASSISTANT

## 2020-02-03 PROCEDURE — 87186 SC STD MICRODIL/AGAR DIL: CPT | Performed by: PHYSICIAN ASSISTANT

## 2020-02-03 PROCEDURE — 87070 CULTURE OTHR SPECIMN AEROBIC: CPT | Performed by: PHYSICIAN ASSISTANT

## 2020-02-03 PROCEDURE — 87086 URINE CULTURE/COLONY COUNT: CPT | Performed by: PHYSICIAN ASSISTANT

## 2020-02-03 PROCEDURE — 87210 SMEAR WET MOUNT SALINE/INK: CPT | Performed by: PHYSICIAN ASSISTANT

## 2020-02-03 RX ORDER — FENOFIBRATE 145 MG/1
TABLET, COATED ORAL
COMMUNITY
End: 2020-06-25 | Stop reason: ALTCHOICE

## 2020-02-03 RX ORDER — METOPROLOL SUCCINATE 200 MG/1
TABLET, EXTENDED RELEASE ORAL
COMMUNITY
End: 2020-06-02 | Stop reason: SDUPTHER

## 2020-02-03 RX ORDER — IBUPROFEN 800 MG/1
TABLET ORAL
COMMUNITY
End: 2020-08-05

## 2020-02-03 RX ORDER — ZOLPIDEM TARTRATE 10 MG/1
TABLET ORAL
COMMUNITY
End: 2020-04-17 | Stop reason: SDUPTHER

## 2020-02-03 RX ORDER — SPIRONOLACTONE 25 MG/1
25 TABLET ORAL DAILY
COMMUNITY
End: 2021-11-23 | Stop reason: HOSPADM

## 2020-02-03 RX ORDER — NITROFURANTOIN 25; 75 MG/1; MG/1
100 CAPSULE ORAL 2 TIMES DAILY
Qty: 10 CAPSULE | Refills: 0 | Status: SHIPPED | OUTPATIENT
Start: 2020-02-03 | End: 2020-06-02 | Stop reason: ALTCHOICE

## 2020-02-03 RX ORDER — CYCLOBENZAPRINE HCL 5 MG
TABLET ORAL
COMMUNITY
End: 2020-05-05

## 2020-02-03 RX ORDER — INSULIN LISPRO 100 [IU]/ML
INJECTION, SOLUTION INTRAVENOUS; SUBCUTANEOUS
COMMUNITY
End: 2020-06-02 | Stop reason: ALTCHOICE

## 2020-02-03 RX ORDER — PIOGLITAZONEHYDROCHLORIDE 45 MG/1
45 TABLET ORAL DAILY
COMMUNITY
End: 2020-08-11

## 2020-02-03 RX ORDER — METRONIDAZOLE 500 MG/1
500 TABLET ORAL EVERY 12 HOURS SCHEDULED
Qty: 10 TABLET | Refills: 0 | Status: SHIPPED | OUTPATIENT
Start: 2020-02-03 | End: 2020-02-08

## 2020-02-03 RX ORDER — OXYCODONE HYDROCHLORIDE 10 MG/1
TABLET ORAL
COMMUNITY
End: 2020-05-26 | Stop reason: SDUPTHER

## 2020-02-03 NOTE — PATIENT INSTRUCTIONS
Advised pt acidophilus to try to decrease  Pt will be treated w Macrobid and Flagyl   Advised fluids, etc

## 2020-02-05 LAB
BACTERIA GENITAL AEROBE CULT: ABNORMAL
BACTERIA UR CULT: ABNORMAL

## 2020-04-02 ENCOUNTER — HOSPITAL ENCOUNTER (OUTPATIENT)
Dept: NEUROLOGY | Facility: CLINIC | Age: 55
Discharge: HOME | End: 2020-04-02
Payer: COMMERCIAL

## 2020-04-02 DIAGNOSIS — M54.16 LUMBAR RADICULOPATHY: ICD-10-CM

## 2020-04-02 PROCEDURE — 95886 MUSC TEST DONE W/N TEST COMP: CPT | Performed by: PSYCHIATRY & NEUROLOGY

## 2020-04-02 PROCEDURE — 95910 NRV CNDJ TEST 7-8 STUDIES: CPT | Performed by: PSYCHIATRY & NEUROLOGY

## 2020-04-03 ENCOUNTER — TELEPHONE (OUTPATIENT)
Dept: FAMILY MEDICINE CLINIC | Facility: CLINIC | Age: 55
End: 2020-04-03

## 2020-04-13 DIAGNOSIS — E11.65 TYPE 2 DIABETES MELLITUS WITH HYPERGLYCEMIA, WITH LONG-TERM CURRENT USE OF INSULIN (HCC): Primary | ICD-10-CM

## 2020-04-13 DIAGNOSIS — Z79.4 TYPE 2 DIABETES MELLITUS WITH HYPERGLYCEMIA, WITH LONG-TERM CURRENT USE OF INSULIN (HCC): Primary | ICD-10-CM

## 2020-04-13 RX ORDER — PREGABALIN 75 MG/1
CAPSULE ORAL
Qty: 180 CAPSULE | Refills: 1 | Status: SHIPPED | OUTPATIENT
Start: 2020-04-13 | End: 2020-10-15

## 2020-04-13 RX ORDER — DULAGLUTIDE 1.5 MG/.5ML
INJECTION, SOLUTION SUBCUTANEOUS
Qty: 6 ML | Refills: 1 | Status: SHIPPED | OUTPATIENT
Start: 2020-04-13 | End: 2020-07-23

## 2020-04-17 DIAGNOSIS — IMO0002 UNCONTROLLED TYPE 2 DIABETES MELLITUS WITH COMPLICATION, WITH LONG-TERM CURRENT USE OF INSULIN: Primary | ICD-10-CM

## 2020-04-17 RX ORDER — INSULIN GLARGINE 100 [IU]/ML
90 INJECTION, SOLUTION SUBCUTANEOUS
COMMUNITY
Start: 2020-02-04 | End: 2020-12-03 | Stop reason: SDUPTHER

## 2020-04-17 RX ORDER — ZOLPIDEM TARTRATE 10 MG/1
10 TABLET ORAL
Qty: 30 TABLET | Refills: 0 | Status: SHIPPED | OUTPATIENT
Start: 2020-04-17 | End: 2020-05-26

## 2020-04-17 RX ORDER — CLONIDINE 0.3 MG/24H
PATCH, EXTENDED RELEASE TRANSDERMAL
COMMUNITY
Start: 2020-02-05 | End: 2020-06-25 | Stop reason: ALTCHOICE

## 2020-04-17 RX ORDER — AMOXICILLIN AND CLAVULANATE POTASSIUM 875; 125 MG/1; MG/1
TABLET, FILM COATED ORAL
COMMUNITY
Start: 2020-01-17 | End: 2020-06-02 | Stop reason: ALTCHOICE

## 2020-04-17 RX ORDER — FLUTICASONE FUROATE, UMECLIDINIUM BROMIDE AND VILANTEROL TRIFENATATE 100; 62.5; 25 UG/1; UG/1; UG/1
POWDER RESPIRATORY (INHALATION)
COMMUNITY
Start: 2020-02-04 | End: 2020-07-23

## 2020-04-17 RX ORDER — HYDROCHLOROTHIAZIDE 25 MG/1
TABLET ORAL
COMMUNITY
Start: 2020-01-20 | End: 2020-06-02 | Stop reason: SDUPTHER

## 2020-04-17 RX ORDER — ALPRAZOLAM 0.5 MG/1
0.25 TABLET ORAL 2 TIMES DAILY PRN
Qty: 30 TABLET | Refills: 1 | Status: SHIPPED | OUTPATIENT
Start: 2020-04-17 | End: 2020-05-26

## 2020-04-17 RX ORDER — HYDRALAZINE HYDROCHLORIDE 50 MG/1
TABLET, FILM COATED ORAL
COMMUNITY
Start: 2020-02-04 | End: 2020-06-25 | Stop reason: ALTCHOICE

## 2020-04-17 RX ORDER — CLINDAMYCIN HYDROCHLORIDE 300 MG/1
CAPSULE ORAL
COMMUNITY
Start: 2020-02-15 | End: 2020-06-02 | Stop reason: ALTCHOICE

## 2020-05-03 ENCOUNTER — NURSE TRIAGE (OUTPATIENT)
Dept: OTHER | Facility: OTHER | Age: 55
End: 2020-05-03

## 2020-05-05 ENCOUNTER — OFFICE VISIT (OUTPATIENT)
Dept: FAMILY MEDICINE CLINIC | Facility: CLINIC | Age: 55
End: 2020-05-05
Payer: COMMERCIAL

## 2020-05-05 VITALS
HEIGHT: 70 IN | SYSTOLIC BLOOD PRESSURE: 158 MMHG | BODY MASS INDEX: 31.07 KG/M2 | RESPIRATION RATE: 16 BRPM | OXYGEN SATURATION: 97 % | DIASTOLIC BLOOD PRESSURE: 100 MMHG | WEIGHT: 217 LBS | HEART RATE: 65 BPM | TEMPERATURE: 97.9 F

## 2020-05-05 DIAGNOSIS — I71.2 THORACIC AORTIC ANEURYSM WITHOUT RUPTURE (HCC): ICD-10-CM

## 2020-05-05 DIAGNOSIS — F51.01 PRIMARY INSOMNIA: ICD-10-CM

## 2020-05-05 DIAGNOSIS — E53.8 B12 DEFICIENCY: ICD-10-CM

## 2020-05-05 DIAGNOSIS — E61.1 IRON DEFICIENCY: ICD-10-CM

## 2020-05-05 DIAGNOSIS — E13.319 RETINAL HEMORRHAGE DUE TO SECONDARY DIABETES (HCC): ICD-10-CM

## 2020-05-05 DIAGNOSIS — M54.16 LUMBAR RADICULOPATHY: ICD-10-CM

## 2020-05-05 DIAGNOSIS — E11.42 DIABETIC PERIPHERAL NEUROPATHY (HCC): ICD-10-CM

## 2020-05-05 DIAGNOSIS — E78.5 HYPERLIPIDEMIA ASSOCIATED WITH TYPE 2 DIABETES MELLITUS (HCC): ICD-10-CM

## 2020-05-05 DIAGNOSIS — H35.049 RETINAL HEMORRHAGE DUE TO SECONDARY DIABETES (HCC): ICD-10-CM

## 2020-05-05 DIAGNOSIS — E11.69 HYPERLIPIDEMIA ASSOCIATED WITH TYPE 2 DIABETES MELLITUS (HCC): ICD-10-CM

## 2020-05-05 DIAGNOSIS — E55.9 VITAMIN D DEFICIENCY: ICD-10-CM

## 2020-05-05 DIAGNOSIS — IMO0002 UNCONTROLLED TYPE 2 DIABETES MELLITUS WITH COMPLICATION, WITH LONG-TERM CURRENT USE OF INSULIN: Primary | ICD-10-CM

## 2020-05-05 DIAGNOSIS — I10 UNCONTROLLED HYPERTENSION: ICD-10-CM

## 2020-05-05 DIAGNOSIS — Z91.19 NONCOMPLIANCE: ICD-10-CM

## 2020-05-05 PROCEDURE — 99214 OFFICE O/P EST MOD 30 MIN: CPT | Performed by: FAMILY MEDICINE

## 2020-05-05 PROCEDURE — 3008F BODY MASS INDEX DOCD: CPT | Performed by: FAMILY MEDICINE

## 2020-05-05 PROCEDURE — 3046F HEMOGLOBIN A1C LEVEL >9.0%: CPT | Performed by: FAMILY MEDICINE

## 2020-05-05 RX ORDER — POTASSIUM CHLORIDE 20 MEQ/1
20 TABLET, EXTENDED RELEASE ORAL 2 TIMES DAILY
Qty: 20 TABLET | Refills: 0 | Status: SHIPPED | OUTPATIENT
Start: 2020-05-05 | End: 2020-05-27 | Stop reason: SDUPTHER

## 2020-05-05 RX ORDER — CYCLOBENZAPRINE HCL 10 MG
10 TABLET ORAL 3 TIMES DAILY PRN
Qty: 30 TABLET | Refills: 0 | Status: SHIPPED | OUTPATIENT
Start: 2020-05-05 | End: 2021-11-23 | Stop reason: HOSPADM

## 2020-05-05 RX ORDER — FUROSEMIDE 40 MG/1
40 TABLET ORAL 2 TIMES DAILY
Qty: 20 TABLET | Refills: 0 | Status: SHIPPED | OUTPATIENT
Start: 2020-05-05 | End: 2020-06-25 | Stop reason: ALTCHOICE

## 2020-05-05 RX ORDER — NABUMETONE 500 MG/1
500 TABLET, FILM COATED ORAL 2 TIMES DAILY
Qty: 20 TABLET | Refills: 0 | Status: SHIPPED | OUTPATIENT
Start: 2020-05-05 | End: 2020-06-15

## 2020-05-20 ENCOUNTER — TELEPHONE (OUTPATIENT)
Dept: FAMILY MEDICINE CLINIC | Facility: CLINIC | Age: 55
End: 2020-05-20

## 2020-05-21 DIAGNOSIS — I71.2 THORACIC AORTIC ANEURYSM WITHOUT RUPTURE (HCC): Primary | ICD-10-CM

## 2020-05-26 ENCOUNTER — APPOINTMENT (OUTPATIENT)
Dept: LAB | Facility: CLINIC | Age: 55
End: 2020-05-26
Payer: COMMERCIAL

## 2020-05-26 ENCOUNTER — TELEPHONE (OUTPATIENT)
Dept: FAMILY MEDICINE CLINIC | Facility: CLINIC | Age: 55
End: 2020-05-26

## 2020-05-26 DIAGNOSIS — IMO0002 UNCONTROLLED TYPE 2 DIABETES MELLITUS WITH COMPLICATION, WITH LONG-TERM CURRENT USE OF INSULIN: ICD-10-CM

## 2020-05-26 DIAGNOSIS — M54.16 LUMBAR RADICULOPATHY: Primary | ICD-10-CM

## 2020-05-26 DIAGNOSIS — E11.42 DIABETIC PERIPHERAL NEUROPATHY (HCC): ICD-10-CM

## 2020-05-26 DIAGNOSIS — E53.8 B12 DEFICIENCY: ICD-10-CM

## 2020-05-26 DIAGNOSIS — E61.1 IRON DEFICIENCY: ICD-10-CM

## 2020-05-26 DIAGNOSIS — E55.9 VITAMIN D DEFICIENCY: ICD-10-CM

## 2020-05-26 LAB
25(OH)D3 SERPL-MCNC: 14.5 NG/ML (ref 30–100)
ALBUMIN SERPL BCP-MCNC: 3.1 G/DL (ref 3.5–5)
ALP SERPL-CCNC: 91 U/L (ref 46–116)
ALT SERPL W P-5'-P-CCNC: 29 U/L (ref 12–78)
ANION GAP SERPL CALCULATED.3IONS-SCNC: 3 MMOL/L (ref 4–13)
AST SERPL W P-5'-P-CCNC: 19 U/L (ref 5–45)
BACTERIA UR QL AUTO: ABNORMAL /HPF
BASOPHILS # BLD AUTO: 0.05 THOUSANDS/ΜL (ref 0–0.1)
BASOPHILS NFR BLD AUTO: 1 % (ref 0–1)
BILIRUB SERPL-MCNC: 0.54 MG/DL (ref 0.2–1)
BILIRUB UR QL STRIP: NEGATIVE
BUN SERPL-MCNC: 15 MG/DL (ref 5–25)
CALCIUM SERPL-MCNC: 8.8 MG/DL (ref 8.3–10.1)
CHLORIDE SERPL-SCNC: 105 MMOL/L (ref 100–108)
CHOLEST SERPL-MCNC: 143 MG/DL (ref 50–200)
CLARITY UR: CLEAR
CO2 SERPL-SCNC: 31 MMOL/L (ref 21–32)
COLOR UR: YELLOW
CREAT SERPL-MCNC: 0.63 MG/DL (ref 0.6–1.3)
CREAT UR-MCNC: 165 MG/DL
EOSINOPHIL # BLD AUTO: 0.1 THOUSAND/ΜL (ref 0–0.61)
EOSINOPHIL NFR BLD AUTO: 1 % (ref 0–6)
ERYTHROCYTE [DISTWIDTH] IN BLOOD BY AUTOMATED COUNT: 14.6 % (ref 11.6–15.1)
EST. AVERAGE GLUCOSE BLD GHB EST-MCNC: 309 MG/DL
FERRITIN SERPL-MCNC: 85 NG/ML (ref 8–388)
GFR SERPL CREATININE-BSD FRML MDRD: 118 ML/MIN/1.73SQ M
GLUCOSE P FAST SERPL-MCNC: 197 MG/DL (ref 65–99)
GLUCOSE UR STRIP-MCNC: ABNORMAL MG/DL
HBA1C MFR BLD: 12.4 %
HCT VFR BLD AUTO: 40.6 % (ref 34.8–46.1)
HDLC SERPL-MCNC: 44 MG/DL
HGB BLD-MCNC: 12.5 G/DL (ref 11.5–15.4)
HGB UR QL STRIP.AUTO: NEGATIVE
HYALINE CASTS #/AREA URNS LPF: ABNORMAL /LPF
IMM GRANULOCYTES # BLD AUTO: 0.06 THOUSAND/UL (ref 0–0.2)
IMM GRANULOCYTES NFR BLD AUTO: 1 % (ref 0–2)
IRON SATN MFR SERPL: 13 %
IRON SERPL-MCNC: 49 UG/DL (ref 50–170)
KETONES UR STRIP-MCNC: NEGATIVE MG/DL
LDLC SERPL CALC-MCNC: 68 MG/DL (ref 0–100)
LEUKOCYTE ESTERASE UR QL STRIP: ABNORMAL
LYMPHOCYTES # BLD AUTO: 2.97 THOUSANDS/ΜL (ref 0.6–4.47)
LYMPHOCYTES NFR BLD AUTO: 30 % (ref 14–44)
MCH RBC QN AUTO: 27.8 PG (ref 26.8–34.3)
MCHC RBC AUTO-ENTMCNC: 30.8 G/DL (ref 31.4–37.4)
MCV RBC AUTO: 90 FL (ref 82–98)
MICROALBUMIN UR-MCNC: 162 MG/L (ref 0–20)
MICROALBUMIN/CREAT 24H UR: 98 MG/G CREATININE (ref 0–30)
MONOCYTES # BLD AUTO: 0.77 THOUSAND/ΜL (ref 0.17–1.22)
MONOCYTES NFR BLD AUTO: 8 % (ref 4–12)
NEUTROPHILS # BLD AUTO: 6.12 THOUSANDS/ΜL (ref 1.85–7.62)
NEUTS SEG NFR BLD AUTO: 59 % (ref 43–75)
NITRITE UR QL STRIP: NEGATIVE
NON-SQ EPI CELLS URNS QL MICRO: ABNORMAL /HPF
NONHDLC SERPL-MCNC: 99 MG/DL
NRBC BLD AUTO-RTO: 0 /100 WBCS
PH UR STRIP.AUTO: 6 [PH]
PLATELET # BLD AUTO: 271 THOUSANDS/UL (ref 149–390)
PMV BLD AUTO: 11.2 FL (ref 8.9–12.7)
POTASSIUM SERPL-SCNC: 3.2 MMOL/L (ref 3.5–5.3)
PROT SERPL-MCNC: 6.8 G/DL (ref 6.4–8.2)
PROT UR STRIP-MCNC: ABNORMAL MG/DL
RBC # BLD AUTO: 4.5 MILLION/UL (ref 3.81–5.12)
RBC #/AREA URNS AUTO: ABNORMAL /HPF
SODIUM SERPL-SCNC: 139 MMOL/L (ref 136–145)
SP GR UR STRIP.AUTO: 1.03 (ref 1–1.03)
TIBC SERPL-MCNC: 369 UG/DL (ref 250–450)
TRIGL SERPL-MCNC: 157 MG/DL
TSH SERPL DL<=0.05 MIU/L-ACNC: 1.39 UIU/ML (ref 0.36–3.74)
UROBILINOGEN UR QL STRIP.AUTO: 0.2 E.U./DL
VIT B12 SERPL-MCNC: 178 PG/ML (ref 100–900)
WBC # BLD AUTO: 10.07 THOUSAND/UL (ref 4.31–10.16)
WBC #/AREA URNS AUTO: ABNORMAL /HPF

## 2020-05-26 PROCEDURE — 82570 ASSAY OF URINE CREATININE: CPT | Performed by: FAMILY MEDICINE

## 2020-05-26 PROCEDURE — 83036 HEMOGLOBIN GLYCOSYLATED A1C: CPT

## 2020-05-26 PROCEDURE — 3060F POS MICROALBUMINURIA REV: CPT | Performed by: ORTHOPAEDIC SURGERY

## 2020-05-26 PROCEDURE — 80053 COMPREHEN METABOLIC PANEL: CPT

## 2020-05-26 PROCEDURE — 82306 VITAMIN D 25 HYDROXY: CPT

## 2020-05-26 PROCEDURE — 81001 URINALYSIS AUTO W/SCOPE: CPT

## 2020-05-26 PROCEDURE — 87389 HIV-1 AG W/HIV-1&-2 AB AG IA: CPT

## 2020-05-26 PROCEDURE — 82043 UR ALBUMIN QUANTITATIVE: CPT | Performed by: FAMILY MEDICINE

## 2020-05-26 PROCEDURE — 83540 ASSAY OF IRON: CPT

## 2020-05-26 PROCEDURE — 84443 ASSAY THYROID STIM HORMONE: CPT

## 2020-05-26 PROCEDURE — 82728 ASSAY OF FERRITIN: CPT

## 2020-05-26 PROCEDURE — 36415 COLL VENOUS BLD VENIPUNCTURE: CPT

## 2020-05-26 PROCEDURE — 82607 VITAMIN B-12: CPT

## 2020-05-26 PROCEDURE — 83550 IRON BINDING TEST: CPT

## 2020-05-26 PROCEDURE — 85025 COMPLETE CBC W/AUTO DIFF WBC: CPT

## 2020-05-26 PROCEDURE — 3046F HEMOGLOBIN A1C LEVEL >9.0%: CPT | Performed by: ORTHOPAEDIC SURGERY

## 2020-05-26 PROCEDURE — 80061 LIPID PANEL: CPT

## 2020-05-26 RX ORDER — ZOLPIDEM TARTRATE 10 MG/1
10 TABLET ORAL
Qty: 30 TABLET | Refills: 0 | Status: SHIPPED | OUTPATIENT
Start: 2020-05-26 | End: 2020-06-29

## 2020-05-26 RX ORDER — OXYCODONE HYDROCHLORIDE 10 MG/1
10 TABLET ORAL 3 TIMES DAILY PRN
Qty: 90 TABLET | Refills: 0 | Status: SHIPPED | OUTPATIENT
Start: 2020-05-26 | End: 2020-11-17 | Stop reason: ALTCHOICE

## 2020-05-26 RX ORDER — ALPRAZOLAM 0.5 MG/1
TABLET ORAL
Qty: 30 TABLET | Refills: 1 | Status: SHIPPED | OUTPATIENT
Start: 2020-05-26 | End: 2020-07-23

## 2020-05-27 ENCOUNTER — TELEPHONE (OUTPATIENT)
Dept: PAIN MEDICINE | Facility: CLINIC | Age: 55
End: 2020-05-27

## 2020-05-27 ENCOUNTER — TELEPHONE (OUTPATIENT)
Dept: FAMILY MEDICINE CLINIC | Facility: CLINIC | Age: 55
End: 2020-05-27

## 2020-05-27 DIAGNOSIS — IMO0002 UNCONTROLLED TYPE 2 DIABETES MELLITUS WITH COMPLICATION, WITH LONG-TERM CURRENT USE OF INSULIN: Primary | ICD-10-CM

## 2020-05-27 DIAGNOSIS — E55.9 VITAMIN D DEFICIENCY: ICD-10-CM

## 2020-05-27 DIAGNOSIS — I10 UNCONTROLLED HYPERTENSION: ICD-10-CM

## 2020-05-27 DIAGNOSIS — E87.6 HYPOKALEMIA: ICD-10-CM

## 2020-05-27 DIAGNOSIS — E61.1 IRON DEFICIENCY: ICD-10-CM

## 2020-05-27 LAB — HIV 1+2 AB+HIV1 P24 AG SERPL QL IA: NORMAL

## 2020-05-27 RX ORDER — POTASSIUM CHLORIDE 20 MEQ/1
20 TABLET, EXTENDED RELEASE ORAL 2 TIMES DAILY
Qty: 20 TABLET | Refills: 0 | Status: SHIPPED | OUTPATIENT
Start: 2020-05-27 | End: 2020-08-06 | Stop reason: ALTCHOICE

## 2020-05-27 RX ORDER — QUINIDINE GLUCONATE 324 MG
240 TABLET, EXTENDED RELEASE ORAL
Qty: 90 TABLET | Refills: 1 | Status: SHIPPED | OUTPATIENT
Start: 2020-05-27 | End: 2020-06-19

## 2020-05-27 RX ORDER — ERGOCALCIFEROL 1.25 MG/1
50000 CAPSULE ORAL 2 TIMES WEEKLY
Qty: 24 CAPSULE | Refills: 1 | Status: SHIPPED | OUTPATIENT
Start: 2020-05-28 | End: 2021-11-23 | Stop reason: HOSPADM

## 2020-06-02 ENCOUNTER — APPOINTMENT (EMERGENCY)
Dept: CT IMAGING | Facility: HOSPITAL | Age: 55
End: 2020-06-02
Payer: COMMERCIAL

## 2020-06-02 ENCOUNTER — HOSPITAL ENCOUNTER (EMERGENCY)
Facility: HOSPITAL | Age: 55
Discharge: HOME | End: 2020-06-02
Attending: EMERGENCY MEDICINE
Payer: COMMERCIAL

## 2020-06-02 ENCOUNTER — APPOINTMENT (EMERGENCY)
Dept: RADIOLOGY | Facility: HOSPITAL | Age: 55
End: 2020-06-02
Payer: COMMERCIAL

## 2020-06-02 VITALS
HEIGHT: 70 IN | SYSTOLIC BLOOD PRESSURE: 200 MMHG | RESPIRATION RATE: 18 BRPM | WEIGHT: 216.27 LBS | TEMPERATURE: 98.4 F | OXYGEN SATURATION: 96 % | HEART RATE: 86 BPM | DIASTOLIC BLOOD PRESSURE: 102 MMHG | BODY MASS INDEX: 30.96 KG/M2

## 2020-06-02 DIAGNOSIS — R00.2 PALPITATIONS: Primary | ICD-10-CM

## 2020-06-02 DIAGNOSIS — R59.1 LYMPHADENOPATHY: ICD-10-CM

## 2020-06-02 DIAGNOSIS — I10 ELEVATED BLOOD PRESSURE READING WITH DIAGNOSIS OF HYPERTENSION: ICD-10-CM

## 2020-06-02 DIAGNOSIS — N28.1 RENAL CYST, LEFT: ICD-10-CM

## 2020-06-02 LAB
ALBUMIN SERPL BCP-MCNC: 3.3 G/DL (ref 3.5–5)
ALP SERPL-CCNC: 103 U/L (ref 46–116)
ALT SERPL W P-5'-P-CCNC: 28 U/L (ref 12–78)
ANION GAP SERPL CALCULATED.3IONS-SCNC: 9 MMOL/L (ref 4–13)
AST SERPL W P-5'-P-CCNC: 14 U/L (ref 5–45)
BASOPHILS # BLD AUTO: 0.05 THOUSANDS/ΜL (ref 0–0.1)
BASOPHILS NFR BLD AUTO: 0 % (ref 0–1)
BILIRUB DIRECT SERPL-MCNC: 0.09 MG/DL (ref 0–0.2)
BILIRUB SERPL-MCNC: 0.33 MG/DL (ref 0.2–1)
BUN SERPL-MCNC: 13 MG/DL (ref 5–25)
CALCIUM SERPL-MCNC: 8.7 MG/DL (ref 8.3–10.1)
CHLORIDE SERPL-SCNC: 101 MMOL/L (ref 100–108)
CO2 SERPL-SCNC: 29 MMOL/L (ref 21–32)
CREAT SERPL-MCNC: 0.79 MG/DL (ref 0.6–1.3)
EOSINOPHIL # BLD AUTO: 0.12 THOUSAND/ΜL (ref 0–0.61)
EOSINOPHIL NFR BLD AUTO: 1 % (ref 0–6)
ERYTHROCYTE [DISTWIDTH] IN BLOOD BY AUTOMATED COUNT: 14.9 % (ref 11.6–15.1)
GFR SERPL CREATININE-BSD FRML MDRD: 99 ML/MIN/1.73SQ M
GLUCOSE SERPL-MCNC: 318 MG/DL (ref 65–140)
HCT VFR BLD AUTO: 42.9 % (ref 34.8–46.1)
HGB BLD-MCNC: 13.8 G/DL (ref 11.5–15.4)
IMM GRANULOCYTES # BLD AUTO: 0.08 THOUSAND/UL (ref 0–0.2)
IMM GRANULOCYTES NFR BLD AUTO: 1 % (ref 0–2)
LIPASE SERPL-CCNC: 91 U/L (ref 73–393)
LYMPHOCYTES # BLD AUTO: 3.81 THOUSANDS/ΜL (ref 0.6–4.47)
LYMPHOCYTES NFR BLD AUTO: 33 % (ref 14–44)
MAGNESIUM SERPL-MCNC: 1.8 MG/DL (ref 1.6–2.6)
MCH RBC QN AUTO: 28.3 PG (ref 26.8–34.3)
MCHC RBC AUTO-ENTMCNC: 32.2 G/DL (ref 31.4–37.4)
MCV RBC AUTO: 88 FL (ref 82–98)
MONOCYTES # BLD AUTO: 0.78 THOUSAND/ΜL (ref 0.17–1.22)
MONOCYTES NFR BLD AUTO: 7 % (ref 4–12)
NEUTROPHILS # BLD AUTO: 6.65 THOUSANDS/ΜL (ref 1.85–7.62)
NEUTS SEG NFR BLD AUTO: 58 % (ref 43–75)
NRBC BLD AUTO-RTO: 0 /100 WBCS
PLATELET # BLD AUTO: 319 THOUSANDS/UL (ref 149–390)
PMV BLD AUTO: 10.9 FL (ref 8.9–12.7)
POTASSIUM SERPL-SCNC: 3.5 MMOL/L (ref 3.5–5.3)
PROT SERPL-MCNC: 7.4 G/DL (ref 6.4–8.2)
RBC # BLD AUTO: 4.88 MILLION/UL (ref 3.81–5.12)
SODIUM SERPL-SCNC: 139 MMOL/L (ref 136–145)
TROPONIN I SERPL-MCNC: <0.02 NG/ML
TROPONIN I SERPL-MCNC: <0.02 NG/ML
TSH SERPL DL<=0.05 MIU/L-ACNC: 1.58 UIU/ML (ref 0.36–3.74)
WBC # BLD AUTO: 11.49 THOUSAND/UL (ref 4.31–10.16)

## 2020-06-02 PROCEDURE — 96374 THER/PROPH/DIAG INJ IV PUSH: CPT

## 2020-06-02 PROCEDURE — 74174 CTA ABD&PLVS W/CONTRAST: CPT

## 2020-06-02 PROCEDURE — 80076 HEPATIC FUNCTION PANEL: CPT | Performed by: PHYSICIAN ASSISTANT

## 2020-06-02 PROCEDURE — 36415 COLL VENOUS BLD VENIPUNCTURE: CPT | Performed by: PHYSICIAN ASSISTANT

## 2020-06-02 PROCEDURE — 99283 EMERGENCY DEPT VISIT LOW MDM: CPT | Performed by: PHYSICIAN ASSISTANT

## 2020-06-02 PROCEDURE — 71275 CT ANGIOGRAPHY CHEST: CPT

## 2020-06-02 PROCEDURE — 80048 BASIC METABOLIC PNL TOTAL CA: CPT | Performed by: PHYSICIAN ASSISTANT

## 2020-06-02 PROCEDURE — 93005 ELECTROCARDIOGRAM TRACING: CPT

## 2020-06-02 PROCEDURE — 84484 ASSAY OF TROPONIN QUANT: CPT | Performed by: PHYSICIAN ASSISTANT

## 2020-06-02 PROCEDURE — 71045 X-RAY EXAM CHEST 1 VIEW: CPT

## 2020-06-02 PROCEDURE — 85025 COMPLETE CBC W/AUTO DIFF WBC: CPT | Performed by: PHYSICIAN ASSISTANT

## 2020-06-02 PROCEDURE — 83690 ASSAY OF LIPASE: CPT | Performed by: PHYSICIAN ASSISTANT

## 2020-06-02 PROCEDURE — 84443 ASSAY THYROID STIM HORMONE: CPT | Performed by: PHYSICIAN ASSISTANT

## 2020-06-02 PROCEDURE — 83735 ASSAY OF MAGNESIUM: CPT | Performed by: PHYSICIAN ASSISTANT

## 2020-06-02 PROCEDURE — 99285 EMERGENCY DEPT VISIT HI MDM: CPT

## 2020-06-02 RX ORDER — SODIUM CHLORIDE 9 MG/ML
3 INJECTION INTRAVENOUS
Status: DISCONTINUED | OUTPATIENT
Start: 2020-06-02 | End: 2020-06-03 | Stop reason: HOSPADM

## 2020-06-02 RX ORDER — CALCIUM CARBONATE 300MG(750)
TABLET,CHEWABLE ORAL
COMMUNITY
End: 2021-11-23 | Stop reason: HOSPADM

## 2020-06-02 RX ORDER — LABETALOL 20 MG/4 ML (5 MG/ML) INTRAVENOUS SYRINGE
10 ONCE
Status: COMPLETED | OUTPATIENT
Start: 2020-06-02 | End: 2020-06-02

## 2020-06-02 RX ADMIN — LABETALOL 20 MG/4 ML (5 MG/ML) INTRAVENOUS SYRINGE 10 MG: at 20:25

## 2020-06-02 RX ADMIN — IOHEXOL 100 ML: 350 INJECTION, SOLUTION INTRAVENOUS at 20:53

## 2020-06-04 LAB
ATRIAL RATE: 78 BPM
ATRIAL RATE: 91 BPM
P AXIS: 36 DEGREES
P AXIS: 48 DEGREES
PR INTERVAL: 158 MS
PR INTERVAL: 178 MS
QRS AXIS: -39 DEGREES
QRS AXIS: -47 DEGREES
QRSD INTERVAL: 102 MS
QRSD INTERVAL: 108 MS
QT INTERVAL: 388 MS
QT INTERVAL: 426 MS
QTC INTERVAL: 477 MS
QTC INTERVAL: 485 MS
T WAVE AXIS: -12 DEGREES
T WAVE AXIS: 8 DEGREES
VENTRICULAR RATE: 78 BPM
VENTRICULAR RATE: 91 BPM

## 2020-06-04 PROCEDURE — 93010 ELECTROCARDIOGRAM REPORT: CPT | Performed by: INTERNAL MEDICINE

## 2020-06-15 ENCOUNTER — CONSULT (OUTPATIENT)
Dept: PAIN MEDICINE | Facility: CLINIC | Age: 55
End: 2020-06-15
Payer: COMMERCIAL

## 2020-06-15 VITALS
TEMPERATURE: 98.1 F | BODY MASS INDEX: 30.85 KG/M2 | WEIGHT: 215 LBS | SYSTOLIC BLOOD PRESSURE: 184 MMHG | DIASTOLIC BLOOD PRESSURE: 101 MMHG | HEART RATE: 90 BPM

## 2020-06-15 DIAGNOSIS — M25.552 LEFT HIP PAIN: ICD-10-CM

## 2020-06-15 DIAGNOSIS — M51.16 INTERVERTEBRAL DISC DISORDER WITH RADICULOPATHY OF LUMBAR REGION: ICD-10-CM

## 2020-06-15 DIAGNOSIS — M47.816 LUMBAR SPONDYLOSIS: ICD-10-CM

## 2020-06-15 DIAGNOSIS — G89.4 CHRONIC PAIN SYNDROME: Primary | ICD-10-CM

## 2020-06-15 PROCEDURE — 99244 OFF/OP CNSLTJ NEW/EST MOD 40: CPT | Performed by: ANESTHESIOLOGY

## 2020-06-15 PROCEDURE — 3046F HEMOGLOBIN A1C LEVEL >9.0%: CPT | Performed by: ANESTHESIOLOGY

## 2020-06-15 RX ORDER — DICLOFENAC SODIUM 75 MG/1
75 TABLET, DELAYED RELEASE ORAL 2 TIMES DAILY
Qty: 60 TABLET | Refills: 0 | Status: SHIPPED | OUTPATIENT
Start: 2020-06-15 | End: 2021-09-02 | Stop reason: ALTCHOICE

## 2020-06-18 DIAGNOSIS — E61.1 IRON DEFICIENCY: ICD-10-CM

## 2020-06-19 RX ORDER — DIAPER,BRIEF,INFANT-TODD,DISP
EACH MISCELLANEOUS
Qty: 90 TABLET | Refills: 1 | Status: SHIPPED | OUTPATIENT
Start: 2020-06-19 | End: 2021-09-07 | Stop reason: SDUPTHER

## 2020-06-25 ENCOUNTER — CONSULT (OUTPATIENT)
Dept: ENDOCRINOLOGY | Facility: CLINIC | Age: 55
End: 2020-06-25
Payer: COMMERCIAL

## 2020-06-25 DIAGNOSIS — I10 UNCONTROLLED HYPERTENSION: Primary | ICD-10-CM

## 2020-06-25 DIAGNOSIS — IMO0002 UNCONTROLLED TYPE 2 DIABETES MELLITUS WITH COMPLICATION, WITH LONG-TERM CURRENT USE OF INSULIN: ICD-10-CM

## 2020-06-25 PROCEDURE — 3046F HEMOGLOBIN A1C LEVEL >9.0%: CPT | Performed by: INTERNAL MEDICINE

## 2020-06-25 PROCEDURE — 99244 OFF/OP CNSLTJ NEW/EST MOD 40: CPT | Performed by: INTERNAL MEDICINE

## 2020-06-29 PROCEDURE — 4010F ACE/ARB THERAPY RXD/TAKEN: CPT | Performed by: ORTHOPAEDIC SURGERY

## 2020-06-29 RX ORDER — LISINOPRIL 40 MG/1
TABLET ORAL
Qty: 180 TABLET | Refills: 1 | Status: SHIPPED | OUTPATIENT
Start: 2020-06-29 | End: 2020-09-18

## 2020-06-29 RX ORDER — ZOLPIDEM TARTRATE 10 MG/1
10 TABLET ORAL
Qty: 30 TABLET | Refills: 0 | Status: SHIPPED | OUTPATIENT
Start: 2020-06-29 | End: 2020-08-11

## 2020-06-29 RX ORDER — INSULIN LISPRO 100 U/ML
INJECTION, SOLUTION SUBCUTANEOUS
Qty: 15 ML | Refills: 5 | Status: SHIPPED | OUTPATIENT
Start: 2020-06-29 | End: 2021-04-13

## 2020-06-29 RX ORDER — HYDROCHLOROTHIAZIDE 25 MG/1
TABLET ORAL
Qty: 90 TABLET | Refills: 1 | Status: SHIPPED | OUTPATIENT
Start: 2020-06-29 | End: 2020-09-18

## 2020-06-29 RX ORDER — SIMVASTATIN 10 MG
TABLET ORAL
Qty: 90 TABLET | Refills: 1 | Status: SHIPPED | OUTPATIENT
Start: 2020-06-29 | End: 2020-09-18

## 2020-06-30 ENCOUNTER — TELEMEDICINE (OUTPATIENT)
Dept: DIABETES SERVICES | Facility: CLINIC | Age: 55
End: 2020-06-30
Payer: COMMERCIAL

## 2020-06-30 DIAGNOSIS — IMO0002 UNCONTROLLED TYPE 2 DIABETES MELLITUS WITH COMPLICATION, WITH LONG-TERM CURRENT USE OF INSULIN: Primary | ICD-10-CM

## 2020-06-30 PROCEDURE — G2012 BRIEF CHECK IN BY MD/QHP: HCPCS | Performed by: DIETITIAN, REGISTERED

## 2020-06-30 PROCEDURE — 97802 MEDICAL NUTRITION INDIV IN: CPT | Performed by: DIETITIAN, REGISTERED

## 2020-07-06 ENCOUNTER — CONSULT (OUTPATIENT)
Dept: CARDIOLOGY CLINIC | Facility: CLINIC | Age: 55
End: 2020-07-06
Payer: COMMERCIAL

## 2020-07-06 VITALS
HEIGHT: 69 IN | SYSTOLIC BLOOD PRESSURE: 152 MMHG | HEART RATE: 80 BPM | WEIGHT: 213.8 LBS | OXYGEN SATURATION: 99 % | DIASTOLIC BLOOD PRESSURE: 90 MMHG | BODY MASS INDEX: 31.67 KG/M2

## 2020-07-06 DIAGNOSIS — I10 UNCONTROLLED HYPERTENSION: ICD-10-CM

## 2020-07-06 DIAGNOSIS — E78.5 HYPERLIPIDEMIA ASSOCIATED WITH TYPE 2 DIABETES MELLITUS (HCC): ICD-10-CM

## 2020-07-06 DIAGNOSIS — I71.9 AORTIC ANEURYSM WITHOUT RUPTURE, UNSPECIFIED PORTION OF AORTA (HCC): Primary | ICD-10-CM

## 2020-07-06 DIAGNOSIS — E11.69 HYPERLIPIDEMIA ASSOCIATED WITH TYPE 2 DIABETES MELLITUS (HCC): ICD-10-CM

## 2020-07-06 DIAGNOSIS — I10 HYPERTENSION, UNSPECIFIED TYPE: ICD-10-CM

## 2020-07-06 PROCEDURE — 3046F HEMOGLOBIN A1C LEVEL >9.0%: CPT | Performed by: INTERNAL MEDICINE

## 2020-07-06 PROCEDURE — 3060F POS MICROALBUMINURIA REV: CPT | Performed by: INTERNAL MEDICINE

## 2020-07-06 PROCEDURE — 99203 OFFICE O/P NEW LOW 30 MIN: CPT | Performed by: INTERNAL MEDICINE

## 2020-07-06 PROCEDURE — 3008F BODY MASS INDEX DOCD: CPT | Performed by: INTERNAL MEDICINE

## 2020-07-06 RX ORDER — METOPROLOL SUCCINATE 200 MG/1
200 TABLET, EXTENDED RELEASE ORAL DAILY
Qty: 90 TABLET | Refills: 3 | Status: SHIPPED | OUTPATIENT
Start: 2020-07-06 | End: 2020-08-11

## 2020-07-06 NOTE — PROGRESS NOTES
Cardiology Consultation     Mahi Blount  [id number]  1965  [facility]  [address] [place] [address]      Diagnoses and all orders for this visit:    Aortic aneurysm without rupture, unspecified portion of aorta (HCC)  -     Cancel: POCT ECG  -     metoprolol succinate (TOPROL-XL) 200 MG 24 hr tablet; Take 1 tablet (200 mg total) by mouth daily    Uncontrolled hypertension  -     metoprolol succinate (TOPROL-XL) 200 MG 24 hr tablet; Take 1 tablet (200 mg total) by mouth daily    Hyperlipidemia associated with type 2 diabetes mellitus ([facility] [address] )    Hypertension, unspecified type      I had the pleasure of seeing Mahi Blount for a consultation regarding Ascending aortic aneurysm requested by Dr Rosales    History of the Presenting Illness, Discussion/Summary and my Plan are as follows:::    Erickson Altamirano is a pleasant 23-year-old with history of hypertension, diabetes, dyslipidemia and chronic and ongoing tobacco use-about 30 pack years  She also has chronic back pain  Also has a lot of stress in life and has been unable to quit smoking completely  She has had a history of thoracic aortic ectasia/aneurysms measuring about 4 2-4 3 cm since 2018  Overall these have been relatively stable in size  Blood pressures have been poorly controlled and is on multiple antihypertensive medications  She is in the process of being ruled out for hyperaldosteronism and is currently off her spironolactone  Was also prescribed clonidine by Neha Brenner about an year ago, has not used it  She is on metoprolol tartrate 100 twice daily but usually takes it once a day, for gets the 2nd dose in the evening after she comes home  She gets palpitations when she is very stressed out but denies any chest pains  Has mild lower extremity edema  Plan:     Aortic aneurysm/aortic ectasia:  Overall similar in size-4 2-4 3 cm-since 2018-last test-June 2020  Next imaging in June 2021-will try MRA to avoid radiation  Will be increasing beta-blocker today  Continue statin  Smoking cessation was advised  Will also need better blood pressure control long-term  See below  Hypertension:  Still poorly controlled, will simply increase her metoprolol dose for her aneurysm as well as for blood pressure  Will change to metoprolol succinate 200 mg daily  Prem she do have mild lower extremity edema and is of spironolactone but will not change any medications while her aldosterone/renin activity is being done by endocrinology  Dyslipidemia:  Controlled on statin  Palpitations: Only when she is very stressed out, prior testing has included an echocardiogram-2017 that was unremarkable, EKGs have chronically demonstrated inferior T-wave inversions  Might have had a stress test around an year ago at [facility] that was negative  Will try to procure results  Follow-up in about 6 months  Results for Charu Catalan (MRN [id number]) as of 7/6/2020 15:28   Ref  Range 5/26/2020 08:34   Hemoglobin A1C  12 4 (H)   Cholesterol Latest Ref Range: 50 - 200 mg/dL 143   Triglycerides Latest Ref Range: <=150 mg/dL 157 (H)   HDL Latest Ref Range: >=40 mg/dL 44   Non-HDL Cholesterol Latest Units: mg/dl 99   LDL Calculated Latest Ref Range: 0 - 100 mg/dL 68       1  Aortic aneurysm without rupture, unspecified portion of aorta (HCC)  metoprolol succinate (TOPROL-XL) 200 MG 24 hr tablet    CANCELED: POCT ECG   2  Uncontrolled hypertension  metoprolol succinate (TOPROL-XL) 200 MG 24 hr tablet   3  Hyperlipidemia associated with type 2 diabetes mellitus ([facility] [address] )     4   Hypertension, unspecified type       Patient Active Problem List   Diagnosis    Anxiety    Cardiac murmur    Carpal tunnel syndrome of left wrist    Change in bowel habit    Chronic pain disorder    Cough    Depression    Retinal hemorrhage due to secondary diabetes ([facility] [address] )  Diabetic peripheral neuropathy (HCC)    Dizziness    Uncontrolled hypertension    Fibromyalgia    Gastroesophageal reflux disease with esophagitis    Hemangioma of bone    Hyperlipidemia associated with type 2 diabetes mellitus (HCC)    Hypertension    Hypoestrogenism    Large adrenal gland (HCC)    Low back pain    Lumbar radiculopathy    Medically complex patient    Neuropathy    Noncompliance    Noncompliance with diet and medication regimen    Overweight    Pancreatitis    Primary insomnia    Right-sided thoracic back pain    Sciatica of left side    Screening for colon cancer    Shingles    Thoracic radiculitis    Tobacco abuse    Uncontrolled type 2 diabetes mellitus with complication, with long-term current use of insulin (HCC)    Vertebral body hemangioma    Vertigo    Vaginal discharge    Yeast vaginitis    Recurrent vaginitis    Abnormal urinalysis    Thoracic aortic aneurysm without rupture (HCC)    Epigastric pain    Urinary tract infection with hematuria     Past Medical History:   Diagnosis Date    Anxiety     Aortic aneurysm (HCC)     Arthritis     Depression     Diabetes mellitus (HCC)     Fibromyalgia     GERD (gastroesophageal reflux disease)     H/O cardiovascular stress test 09/2018    no ischemia  EF 70%   H/O echocardiogram 01/2019    EF 60%  Mild LVH  trivial effusion      Hyperlipidemia     Hypertension     Migraines     Psychiatric disorder     anxiety     Social History     Socioeconomic History    Marital status: Legally      Spouse name: Not on file    Number of children: Not on file    Years of education: Not on file    Highest education level: Not on file   Occupational History    Not on file   Social Needs    Financial resource strain: Not on file    Food insecurity:     Worry: Not on file     Inability: Not on file    Transportation needs:     Medical: Not on file     Non-medical: Not on file   Tobacco Use    Smoking status: Current Every Day Smoker     Packs/day: 1 00     Years: 30 00     Pack years: 30 00    Smokeless tobacco: Never Used   Substance and Sexual Activity    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       Drug use: Not Currently     Types: Cocaine    Sexual activity: Yes     Birth control/protection: Female Sterilization     Comment: Monogamous relationship with monogamous partner   Lifestyle    Physical activity:     Days per week: Not on file     Minutes per session: Not on file    Stress: Not on file   Relationships    Social connections:     Talks on phone: Not on file     Gets together: Not on file     Attends Muslim service: Not on file     Active member of club or organization: Not on file     Attends meetings of clubs or organizations: Not on file     Relationship status: Not on file    Intimate partner violence:     Fear of current or ex partner: Not on file     Emotionally abused: Not on file     Physically abused: Not on file     Forced sexual activity: Not on file   Other Topics Concern    Not on file   Social History Narrative    Most recent tobacco use screenin2019    Do you currently or have you served in the [employer]: No    Were you activated, into active duty, as a member of the [employer] or as a Reservist: No    Advance directive: No    Chewing tobacco: none    Alcohol intake: None    Marital status: Single    Live alone or with others: with others    Family history of heart disease:    aortic aneurysm    High cholesterol: Yes    High blood pressure: Yes    Exercise level: Heavy    Overweight: Yes    Obese: Yes    Diabetes: Yes    General stress level: High    Diet: Regular    Caffeine intake: Occasional    Guns present in home: No    Seat belts used routinely: Yes    Sexual orientation: Heterosexual    Sunscreen used routinely: No    Seat belt/car seat used routinely: Yes    Exercise-How often do you exercise: as tolerated    Do you have regular medical check ups: Yes    Do you have regular dental check ups: Yes    Illicit drugs-years of use:    recovery 23 years - HX of      Family History   Problem Relation Age of Onset    Hypertension Mother    [facility] Arthritis Mother     Diabetes Father     Other Sister         renal cell carcinoma    Diabetes Other      Past Surgical History:   Procedure Laterality Date    BACK SURGERY      Lumbar epidural steroid injection    CARPAL TUNNEL RELEASE Left      SECTION      CHOLECYSTECTOMY      COLONOSCOPY      incomplete    HYSTERECTOMY      Total    OVARIAN CYST REMOVAL      TUBAL LIGATION         Current Outpatient Medications:     ADMELOG SOLOSTAR 100 units/mL injection pen, INJECT 20 UNIT 3 TIMES A DAY BY SUBCUTANEOUS ROUTE (Patient taking differently: No sig reported), Disp: 15 mL, Rfl: 5    ALPRAZolam (XANAX) 0 5 mg tablet, "ONE-HALF" TABLET TWO TIMES A DAY, Disp: 30 tablet, Rfl: 1    amLODIPine (NORVASC) 10 mg tablet, Take 10 mg by mouth daily, Disp: , Rfl:     BASAGLAR KWIKPEN 100 units/mL injection pen, Inject under the skin daily at bedtime , Disp: , Rfl:     CV[employer] IRON 240 (27 Fe) MG tablet, TAKE 1 TABLET (240 MG TOTAL) BY MOUTH 3 (THREE) TIMES A DAY WITH MEALS, Disp: 90 tablet, Rfl: 1    cyclobenzaprine (FLEXERIL) 10 mg tablet, Take 1 tablet (10 mg total) by mouth 3 (three) times a day as needed for muscle spasms for up to 10 days, Disp: 30 tablet, Rfl: 0    doxazosin (CARDURA) 4 mg tablet, Take 4 mg by mouth daily, Disp: , Rfl:     ergocalciferol (VITAMIN D2) 50,000 units, Take 1 capsule (50,000 Units total) by mouth 2 (two) times a week, Disp: 24 capsule, Rfl: 1    hydrochlorothiazide (HYDRODIURIL) 25 mg tablet, TAKE 1 TABLET EVERY DAY BY ORAL ROUTE, Disp: 90 tablet, Rfl: 1    ibuprofen (MOTRIN) 800 mg tablet, ibuprofen 800 mg tablet, Disp: , Rfl:     Insulin Pen Needle (ULTICARE MICRO PEN NEEDLES) 32G X 4 MM MISC, 1 Device by Does not apply route 4 (four) times a day, Disp: , Rfl:     Insulin Pen Needle (UNIFINE PENTIPS PLUS) 31G X 6 MM MISC, 1 Device by Does not apply route 4 (four) times a day, Disp: , Rfl:     Lancets MISC, 1 Device by Does not apply route 4 (four) times a day, Disp: , Rfl:     lisinopril (ZESTRIL) 40 mg tablet, TAKE 1 TABLET TWICE A DAY BY ORAL ROUTE, Disp: 180 tablet, Rfl: 1    Magnesium 400 MG TABS, Take by mouth, Disp: , Rfl:     meclizine (ANTIVERT) 25 mg tablet, Take 1 tablet (25 mg total) by mouth every 8 (eight) hours as needed for dizziness, Disp: 30 tablet, Rfl: 0    pantoprazole (PROTONIX) 20 mg tablet, Take 2 tablets (40 mg total) by mouth daily, Disp: 30 tablet, Rfl: 3    pioglitazone (ACTOS) 45 mg tablet, pioglitazone 45 mg tablet, Disp: , Rfl:     pregabalin (LYRICA) 75 mg capsule, TAKE ONE CAPSULE EVERY DAY, Disp: 180 capsule, Rfl: 1    ranibizumab (LUCENTIS) 0 3 mg/0 05mL, Lucentis 0 3 mg/0 05 mL intravitreal solution for injection, Disp: , Rfl:     simvastatin (ZOCOR) 10 mg tablet, TAKE 1 TABLET EVERY DAY BY ORAL ROUTE, Disp: 90 tablet, Rfl: 1    spironolactone (ALDACTONE) 25 mg tablet, spironolactone 25 mg tablet, Disp: , Rfl:     TRELEGY ELLIPTA 100-62 5-25 MCG/INH inhaler, , Disp: , Rfl:     TRULICITY 1 5 FZ/9 7AS SOPN, INJECT 0 5 ML EVERY WEEK BY SUBCUTANEOUS ROUTE , Disp: 6 mL, Rfl: 1    zolpidem (AMBIEN) 10 mg tablet, TAKE 1 TABLET (10 MG TOTAL) BY MOUTH DAILY AT BEDTIME AS NEEDED FOR SLEEP, Disp: 30 tablet, Rfl: 0    diclofenac (VOLTAREN) 75 mg EC tablet, Take 1 tablet (75 mg total) by mouth 2 (two) times a day, Disp: 60 tablet, Rfl: 0    metoprolol succinate (TOPROL-XL) 200 MG 24 hr tablet, Take 1 tablet (200 mg total) by mouth daily, Disp: 90 tablet, Rfl: 3    oxyCODONE (ROXICODONE) 10 MG TABS, Take 1 tablet (10 mg total) by mouth 3 (three) times a day as needed for moderate painMax Daily Amount: 30 mg (Patient not taking: Reported on 7/6/2020), Disp: 90 tablet, Rfl: 0    potassium chloride (K-DUR,KLOR-CON) 20 mEq tablet, Take 1 tablet (20 mEq total) by mouth 2 (two) times a day for 10 days, Disp: 20 tablet, Rfl: 0  No Known Allergies  Vitals:    07/06/20 1455   BP: 152/90   BP Location: Left arm   Patient Position: Sitting   Cuff Size: Large   Pulse: 80   SpO2: 99%   Weight: 97 kg (213 lb 12 8 oz)   Height: 5' 9" (1 753 m)         Imaging: No results found  Review of Systems:  Review of Systems   Constitutional: Negative  HENT: Negative  Eyes: Negative  Respiratory: Negative  Cardiovascular: Negative  Endocrine: Negative  Musculoskeletal: Positive for arthralgias and back pain  Physical Exam:    /90 (BP Location: Left arm, Patient Position: Sitting, Cuff Size: Large)   Pulse 80   Ht 5' 9" (1 753 m)   Wt 97 kg (213 lb 12 8 oz)   SpO2 99%   BMI 31 57 kg/m²   Physical Exam   Constitutional: She appears well-developed and well-nourished  No distress  HENT:   Head: Normocephalic and atraumatic  Eyes: Pupils are equal, round, and reactive to light  Conjunctivae are normal  Right eye exhibits no discharge  Left eye exhibits no discharge  No scleral icterus  Neck: Normal range of motion  No tracheal deviation present  No thyromegaly present  Cardiovascular: Normal rate and regular rhythm  Exam reveals no friction rub  No murmur heard  Pulmonary/Chest: Effort normal and breath sounds normal  No stridor  No respiratory distress  She has no wheezes  Abdominal: Soft  Bowel sounds are normal  She exhibits no distension  There is no tenderness  There is no guarding  Musculoskeletal: She exhibits no edema (mild lower ext edema) or deformity  Skin: Skin is warm  No rash noted  She is not diaphoretic  No erythema  No pallor

## 2020-07-09 ENCOUNTER — OFFICE VISIT (OUTPATIENT)
Dept: OBGYN CLINIC | Facility: CLINIC | Age: 55
End: 2020-07-09
Payer: COMMERCIAL

## 2020-07-09 ENCOUNTER — TELEPHONE (OUTPATIENT)
Dept: OBGYN CLINIC | Facility: CLINIC | Age: 55
End: 2020-07-09

## 2020-07-09 VITALS
SYSTOLIC BLOOD PRESSURE: 118 MMHG | HEIGHT: 69 IN | TEMPERATURE: 96.4 F | BODY MASS INDEX: 31.22 KG/M2 | WEIGHT: 210.8 LBS | DIASTOLIC BLOOD PRESSURE: 68 MMHG

## 2020-07-09 DIAGNOSIS — N89.8 VAGINAL DISCHARGE: ICD-10-CM

## 2020-07-09 DIAGNOSIS — B96.89 BACTERIAL VAGINOSIS: Primary | ICD-10-CM

## 2020-07-09 DIAGNOSIS — Z11.3 SCREENING EXAMINATION FOR VENEREAL DISEASE: ICD-10-CM

## 2020-07-09 DIAGNOSIS — N76.0 BACTERIAL VAGINOSIS: Primary | ICD-10-CM

## 2020-07-09 LAB
BV WHIFF TEST VAG QL: ABNORMAL
CLUE CELLS SPEC QL WET PREP: PRESENT
PH SMN: ABNORMAL [PH]
SL AMB POCT WET MOUNT: ABNORMAL
T VAGINALIS VAG QL WET PREP: ABNORMAL
YEAST VAG QL WET PREP: ABNORMAL

## 2020-07-09 PROCEDURE — 3008F BODY MASS INDEX DOCD: CPT | Performed by: PHYSICIAN ASSISTANT

## 2020-07-09 PROCEDURE — 3046F HEMOGLOBIN A1C LEVEL >9.0%: CPT | Performed by: PHYSICIAN ASSISTANT

## 2020-07-09 PROCEDURE — 99213 OFFICE O/P EST LOW 20 MIN: CPT | Performed by: PHYSICIAN ASSISTANT

## 2020-07-09 PROCEDURE — 3060F POS MICROALBUMINURIA REV: CPT | Performed by: PHYSICIAN ASSISTANT

## 2020-07-09 PROCEDURE — 87591 N.GONORRHOEAE DNA AMP PROB: CPT | Performed by: PHYSICIAN ASSISTANT

## 2020-07-09 PROCEDURE — 87210 SMEAR WET MOUNT SALINE/INK: CPT | Performed by: PHYSICIAN ASSISTANT

## 2020-07-09 PROCEDURE — 87491 CHLMYD TRACH DNA AMP PROBE: CPT | Performed by: PHYSICIAN ASSISTANT

## 2020-07-09 RX ORDER — METRONIDAZOLE 500 MG/1
500 TABLET ORAL EVERY 12 HOURS SCHEDULED
Qty: 14 TABLET | Refills: 0 | Status: SHIPPED | OUTPATIENT
Start: 2020-07-09 | End: 2020-07-16

## 2020-07-09 NOTE — PROGRESS NOTES
Assessment/Plan:    Bacterial vaginosis  Reviewed BV with patient in length  Will plan to treat with Metronidazole 500mg BID x 7 days  For prevention: Recommend acidophilus or yogurt daily, avoid irritating soaps or lotions, no tight fitted pants or underwear, avoid bubble baths, do not stay in wet swimsuit  STD cultures done today  Office will call with results and appropriate follow up  Problem List Items Addressed This Visit        Genitourinary    Bacterial vaginosis - Primary     Reviewed BV with patient in length  Will plan to treat with Metronidazole 500mg BID x 7 days  For prevention: Recommend acidophilus or yogurt daily, avoid irritating soaps or lotions, no tight fitted pants or underwear, avoid bubble baths, do not stay in wet swimsuit  STD cultures done today  Office will call with results and appropriate follow up  Relevant Medications    metroNIDAZOLE (FLAGYL) 500 mg tablet      Other Visit Diagnoses     Screening examination for venereal disease        Relevant Orders    Chlamydia/GC amplified DNA by PCR            Subjective:      Patient ID: Rebecca Junior is a 48 y o  female  HPI  49 yo seen for vaginal discharge  Patient reports noticed over the last week  States had keflex for ear infection a few weeks ago  Also sexually active with  for the first time in almost a year recently  Used monistat last night  Denies itching  Reports odor, difficult to describe  No pelvic pain, fever, chills, bowel or bladder issues  Would like STD testing today  The following portions of the patient's history were reviewed and updated as appropriate:   She  has a past medical history of Anxiety, Aortic aneurysm ([facility] [address] ), Arthritis, Depression, Diabetes mellitus ([facility] [address] ), Fibromyalgia, GERD (gastroesophageal reflux disease), H/O cardiovascular stress test (09/2018), H/O echocardiogram (01/2019), Hyperlipidemia, Hypertension, Migraines, and Psychiatric disorder    She   Patient Active Problem List    Diagnosis Date Noted    Bacterial vaginosis 2020    Urinary tract infection with hematuria 2020    Epigastric pain 2019    Thoracic aortic aneurysm without rupture ([facility] [address] ) 2018    Abnormal urinalysis 2018    Vaginal discharge 2018    Yeast vaginitis 2018    Recurrent vaginitis 2018    Cardiac murmur 12/15/2017    Primary insomnia 12/15/2017    Anxiety 2017    Depression 2017    Retinal hemorrhage due to secondary diabetes ([facility] [address] ) 2017    Fibromyalgia 2017    Hypertension 2017    Hypoestrogenism 2017    Neuropathy 2017    Chronic pain disorder 2017    Medically complex patient 2017    Change in bowel habit 2017    Screening for colon cancer 2016    Cough 02/15/2016    Noncompliance 2016    Dizziness 2016    Gastroesophageal reflux disease with esophagitis 2016    Vertigo 2016    Vertebral body hemangioma 2015    Hemangioma of bone 2015    Large adrenal gland ([facility] [address] ) 2015    Pancreatitis 2015    Right-sided thoracic back pain 2015    Thoracic radiculitis 2015    Carpal tunnel syndrome of left wrist 2015    Tobacco abuse 2015    Uncontrolled type 2 diabetes mellitus with complication, with long-term current use of insulin ([facility] [address] ) 2015    Hyperlipidemia associated with type 2 diabetes mellitus ([facility] [address] ) 2015    Noncompliance with diet and medication regimen 2015    Shingles 2015    Diabetic peripheral neuropathy ([facility] [address] ) 2015    Uncontrolled hypertension 2015    Low back pain 2015    Lumbar radiculopathy 2015    Overweight 2015    Sciatica of left side 2015     She  has a past surgical history that includes Hysterectomy; Cholecystectomy; Ovarian cyst removal; Tubal ligation; Carpal tunnel release (Left);   section; Colonoscopy; and Back surgery  Her family history includes Arthritis in her mother; Diabetes in her father and other; Hypertension in her mother; Other in her sister  She  reports that she has been smoking cigarettes  She has a 30 00 pack-year smoking history  She has never used smokeless tobacco  She reports that she drank alcohol  She reports that she has current or past drug history  Drug: Cocaine    Current Outpatient Medications   Medication Sig Dispense Refill    ADMELOG SOLOSTAR 100 units/mL injection pen INJECT 20 UNIT 3 TIMES A DAY BY SUBCUTANEOUS ROUTE (Patient taking differently: No sig reported) 15 mL 5    ALPRAZolam (XANAX) 0 5 mg tablet "ONE-HALF" TABLET TWO TIMES A DAY 30 tablet 1    amLODIPine (NORVASC) 10 mg tablet Take 10 mg by mouth daily      CVS IRON 240 (27 Fe) MG tablet TAKE 1 TABLET (240 MG TOTAL) BY MOUTH 3 (THREE) TIMES A DAY WITH MEALS 90 tablet 1    cyclobenzaprine (FLEXERIL) 10 mg tablet Take 1 tablet (10 mg total) by mouth 3 (three) times a day as needed for muscle spasms for up to 10 days 30 tablet 0    diclofenac (VOLTAREN) 75 mg EC tablet Take 1 tablet (75 mg total) by mouth 2 (two) times a day 60 tablet 0    doxazosin (CARDURA) 4 mg tablet Take 4 mg by mouth daily      ergocalciferol (VITAMIN D2) 50,000 units Take 1 capsule (50,000 Units total) by mouth 2 (two) times a week 24 capsule 1    hydrochlorothiazide (HYDRODIURIL) 25 mg tablet TAKE 1 TABLET EVERY DAY BY ORAL ROUTE 90 tablet 1    ibuprofen (MOTRIN) 800 mg tablet ibuprofen 800 mg tablet      Insulin Pen Needle (ULTICARE MICRO PEN NEEDLES) 32G X 4 MM MISC 1 Device by Does not apply route 4 (four) times a day      Insulin Pen Needle (UNIFINE PENTIPS PLUS) 31G X 6 MM MISC 1 Device by Does not apply route 4 (four) times a day      Lancets MISC 1 Device by Does not apply route 4 (four) times a day      lisinopril (ZESTRIL) 40 mg tablet TAKE 1 TABLET TWICE A DAY BY ORAL ROUTE 180 tablet 1    Magnesium 400 MG TABS Take by mouth  meclizine (ANTIVERT) 25 mg tablet Take 1 tablet (25 mg total) by mouth every 8 (eight) hours as needed for dizziness 30 tablet 0    metoprolol succinate (TOPROL-XL) 200 MG 24 hr tablet Take 1 tablet (200 mg total) by mouth daily 90 tablet 3    oxyCODONE (ROXICODONE) 10 MG TABS Take 1 tablet (10 mg total) by mouth 3 (three) times a day as needed for moderate painMax Daily Amount: 30 mg 90 tablet 0    pantoprazole (PROTONIX) 20 mg tablet Take 2 tablets (40 mg total) by mouth daily 30 tablet 3    pioglitazone (ACTOS) 45 mg tablet pioglitazone 45 mg tablet      potassium chloride (K-DUR,KLOR-CON) 20 mEq tablet Take 1 tablet (20 mEq total) by mouth 2 (two) times a day for 10 days 20 tablet 0    pregabalin (LYRICA) 75 mg capsule TAKE ONE CAPSULE EVERY  capsule 1    ranibizumab (LUCENTIS) 0 3 mg/0 05mL Lucentis 0 3 mg/0 05 mL intravitreal solution for injection      simvastatin (ZOCOR) 10 mg tablet TAKE 1 TABLET EVERY DAY BY ORAL ROUTE 90 tablet 1    spironolactone (ALDACTONE) 25 mg tablet spironolactone 25 mg tablet      TRELEGY ELLIPTA 100-62 5-25 MCG/INH inhaler       TRULICITY 1 5 GO/5 5PA SOPN INJECT 0 5 ML EVERY WEEK BY SUBCUTANEOUS ROUTE  6 mL 1    zolpidem (AMBIEN) 10 mg tablet TAKE 1 TABLET (10 MG TOTAL) BY MOUTH DAILY AT BEDTIME AS NEEDED FOR SLEEP 30 tablet 0    BASAGLAR KWIKPEN 100 units/mL injection pen Inject under the skin daily at bedtime       metroNIDAZOLE (FLAGYL) 500 mg tablet Take 1 tablet (500 mg total) by mouth every 12 (twelve) hours for 7 days 14 tablet 0     No current facility-administered medications for this visit  She has No Known Allergies       Review of Systems   Constitutional: Negative for fatigue, fever and unexpected weight change  HENT: Negative for dental problem and sinus pressure  Eyes: Negative for visual disturbance  Respiratory: Negative for cough, shortness of breath and wheezing  Cardiovascular: Negative for chest pain     Gastrointestinal: Negative for abdominal pain, blood in stool, constipation, diarrhea, nausea and vomiting  Endocrine: Negative for polydipsia  Genitourinary: Positive for vaginal discharge  Negative for difficulty urinating, dyspareunia, dysuria, frequency, hematuria, pelvic pain and urgency  Musculoskeletal: Negative for arthralgias and back pain  Neurological: Negative for dizziness, seizures, light-headedness and headaches  Psychiatric/Behavioral: Negative for suicidal ideas  The patient is not nervous/anxious  Objective:      /68 (BP Location: Right arm, Patient Position: Sitting, Cuff Size: Standard)   Temp (!) 96 4 °F (35 8 °C)   Ht 5' 9" (1 753 m)   Wt 95 6 kg (210 lb 12 8 oz)   BMI 31 13 kg/m²          Physical Exam   Constitutional: She is oriented to person, place, and time  She appears well-developed and well-nourished  Neck: Neck supple  No thyroid mass and no thyromegaly present  Cardiovascular: Normal rate, regular rhythm and normal heart sounds  Exam reveals no gallop and no friction rub  No murmur heard  Pulmonary/Chest: Effort normal and breath sounds normal  No respiratory distress  She has no wheezes  She has no rales  Abdominal: Soft  Normal appearance and bowel sounds are normal  She exhibits no distension and no mass  There is no tenderness  There is no rebound and no guarding  Genitourinary: No breast tenderness, discharge or bleeding  There is no rash, tenderness, lesion or injury on the right labia  There is no rash, tenderness, lesion or injury on the left labia  Uterus is not deviated, not enlarged and not tender  Cervix exhibits no motion tenderness, no discharge and no friability  Right adnexum displays no mass, no tenderness and no fullness  Left adnexum displays no mass, no tenderness and no fullness  No erythema, tenderness or bleeding in the vagina  No signs of injury around the vagina  Vaginal discharge (thin white discharge in vaginal vault) found  Lymphadenopathy:     She has no cervical adenopathy  Right: No inguinal and no supraclavicular adenopathy present  Left: No inguinal and no supraclavicular adenopathy present  Neurological: She is alert and oriented to person, place, and time  Skin: Skin is warm and dry  No rash noted  No erythema  No pallor  Psychiatric: She has a normal mood and affect  Her behavior is normal  Judgment and thought content normal        [place]et [place]: Clue cells throughout  No yeast or trichomonads

## 2020-07-09 NOTE — ASSESSMENT & PLAN NOTE
Reviewed BV with patient in length  Will plan to treat with Metronidazole 500mg BID x 7 days  For prevention: Recommend acidophilus or yogurt daily, avoid irritating soaps or lotions, no tight fitted pants or underwear, avoid bubble baths, do not stay in wet swimsuit  STD cultures done today  Office will call with results and appropriate follow up

## 2020-07-11 LAB
C TRACH DNA SPEC QL NAA+PROBE: NEGATIVE
N GONORRHOEA DNA SPEC QL NAA+PROBE: NEGATIVE

## 2020-07-13 ENCOUNTER — TELEPHONE (OUTPATIENT)
Dept: OBGYN CLINIC | Facility: CLINIC | Age: 55
End: 2020-07-13

## 2020-07-13 ENCOUNTER — TELEPHONE (OUTPATIENT)
Dept: PAIN MEDICINE | Facility: MEDICAL CENTER | Age: 55
End: 2020-07-13

## 2020-07-13 NOTE — TELEPHONE ENCOUNTER
Pt called stating that she would like to know if something can be given for her pain     Pt can be reached at [phone]

## 2020-07-14 NOTE — TELEPHONE ENCOUNTER
S/w pt, states she has been experiencing increased pain in lower back, buttocks, and left leg  Pt said pain is consistently 8/10  Pt reports she has been taking diclofenac with no relief, per pt it is not strong enough  She is also taking lyrica, per office note from 6/15 may consider increasing lyrica? Pt denies s/e from lyrica  Please advise, thank you

## 2020-07-20 LAB
LEFT EYE DIABETIC RETINOPATHY: NORMAL
RIGHT EYE DIABETIC RETINOPATHY: NORMAL

## 2020-07-20 PROCEDURE — 2022F DILAT RTA XM EVC RTNOPTHY: CPT | Performed by: ORTHOPAEDIC SURGERY

## 2020-07-22 ENCOUNTER — TELEPHONE (OUTPATIENT)
Dept: PAIN MEDICINE | Facility: CLINIC | Age: 55
End: 2020-07-22

## 2020-07-22 ENCOUNTER — TELEMEDICINE (OUTPATIENT)
Dept: FAMILY MEDICINE CLINIC | Facility: CLINIC | Age: 55
End: 2020-07-22
Payer: COMMERCIAL

## 2020-07-22 DIAGNOSIS — M51.16 INTERVERTEBRAL DISC DISORDER WITH RADICULOPATHY OF LUMBAR REGION: ICD-10-CM

## 2020-07-22 DIAGNOSIS — R11.0 NAUSEA: Primary | ICD-10-CM

## 2020-07-22 DIAGNOSIS — G89.4 CHRONIC PAIN SYNDROME: Primary | ICD-10-CM

## 2020-07-22 PROCEDURE — 99214 OFFICE O/P EST MOD 30 MIN: CPT | Performed by: FAMILY MEDICINE

## 2020-07-22 PROCEDURE — 3060F POS MICROALBUMINURIA REV: CPT | Performed by: FAMILY MEDICINE

## 2020-07-22 PROCEDURE — 2026F EYE IMG VALID EVC RTNOPTHY: CPT | Performed by: FAMILY MEDICINE

## 2020-07-22 PROCEDURE — 3046F HEMOGLOBIN A1C LEVEL >9.0%: CPT | Performed by: FAMILY MEDICINE

## 2020-07-22 RX ORDER — METOCLOPRAMIDE 10 MG/1
10 TABLET ORAL 4 TIMES DAILY
Qty: 28 TABLET | Refills: 0 | Status: SHIPPED | OUTPATIENT
Start: 2020-07-22 | End: 2020-07-29

## 2020-07-22 NOTE — TELEPHONE ENCOUNTER
Called patient to see if I could help her with the questions that she had but she wants to know how and when she can get this scheduled  It was discussed at her consult visit with Dr Tianna Mcdonnell in June  Please call her at the number provided in this task   Thank you

## 2020-07-22 NOTE — TELEPHONE ENCOUNTER
Pt called in she has some question in regard to the Spinal cord stimulator  Please be advise thank you        Please call patient back @ [phone]

## 2020-07-22 NOTE — PROGRESS NOTES
Virtual Regular Visit      Assessment/Plan:    Problem List Items Addressed This Visit     None      Visit Diagnoses     Nausea    -  Primary    Relevant Medications    metoclopramide (REGLAN) 10 mg tablet             Wondering of there is gastroperesis    Explained the mechanism of hyperanalgesia again   Try to  Bare the pain   Use the nsaid   Use the lyrica   Your body will adjust   Consider surgical consult     Reason for visit is   Chief Complaint   Patient presents with    Virtual Regular Visit        Encounter provider Beltran Busch MD    Provider located at [address] [address]      Recent Visits  No visits were found meeting these conditions  Showing recent visits within past 7 days and meeting all other requirements     Today's Visits  Date Type Provider Dept   07/22/20 Telemedicine Beltran Busch MD [facility]   Showing today's visits and meeting all other requirements     Future Appointments  No visits were found meeting these conditions  Showing future appointments within next 150 days and meeting all other requirements        The patient was identified by name and date of birth  Kelli Jarrell was informed that this is a telemedicine visit and that the visit is being conducted through [employer] and patient was informed that this is not a secure, HIPAA-complaint platform  She agrees to proceed     My office door was closed  No one else was in the room  She acknowledged consent and understanding of privacy and security of the video platform  The patient has agreed to participate and understands they can discontinue the visit at any time  Patient is aware this is a billable service  Subjective  Kelli Faith is a 48 y o  female          HPI   Not eating well   Smell --> nauseua   No changes in BM  - no constipation no diarrhea no blood   No fevers   About 1 week now   No changes in smells or taste   Just not sitting well when she eats   EGD done     Wanted another oxycodone pill and PM was not good with that  She was upset of course   She does have a hx of 42 WHITNEY though so that sounds excessive    Doubt that is the right thing to do anymore       Past Medical History:   Diagnosis Date    Anxiety     Aortic aneurysm ([address] )     Arthritis     Depression     Diabetes mellitus (HCC)     Fibromyalgia     GERD (gastroesophageal reflux disease)     H/O cardiovascular stress test 2018    no ischemia  EF 70%   H/O echocardiogram 2019    EF 60%  Mild LVH  trivial effusion      Hyperlipidemia     Hypertension     Migraines     Psychiatric disorder     anxiety       Past Surgical History:   Procedure Laterality Date    BACK SURGERY      Lumbar epidural steroid injection    CARPAL TUNNEL RELEASE Left      SECTION      CHOLECYSTECTOMY      COLONOSCOPY      incomplete    HYSTERECTOMY      Total    OVARIAN CYST REMOVAL      TUBAL LIGATION         Current Outpatient Medications   Medication Sig Dispense Refill    ADMELOG SOLOSTAR 100 units/mL injection pen INJECT 20 UNIT 3 TIMES A DAY BY SUBCUTANEOUS ROUTE (Patient taking differently: No sig reported) 15 mL 5    ALPRAZolam (XANAX) 0 5 mg tablet "ONE-HALF" TABLET TWO TIMES A DAY 30 tablet 1    amLODIPine (NORVASC) 10 mg tablet Take 10 mg by mouth daily      BASAGLAR KWIKPEN 100 units/mL injection pen Inject under the skin daily at bedtime       [facility] IRON 240 (27 Fe) MG tablet TAKE 1 TABLET (240 MG TOTAL) BY MOUTH 3 (THREE) TIMES A DAY WITH MEALS 90 tablet 1    cyclobenzaprine (FLEXERIL) 10 mg tablet Take 1 tablet (10 mg total) by mouth 3 (three) times a day as needed for muscle spasms for up to 10 days 30 tablet 0    diclofenac (VOLTAREN) 75 mg EC tablet Take 1 tablet (75 mg total) by mouth 2 (two) times a day 60 tablet 0    doxazosin (CARDURA) 4 mg tablet Take 4 mg by mouth daily      ergocalciferol (VITAMIN D2) 50,000 units Take 1 capsule (50,000 Units total) by mouth 2 (two) times a week 24 capsule 1    hydrochlorothiazide (HYDRODIURIL) 25 mg tablet TAKE 1 TABLET EVERY DAY BY ORAL ROUTE 90 tablet 1    ibuprofen (MOTRIN) 800 mg tablet ibuprofen 800 mg tablet      Insulin Pen Needle (ULTICARE MICRO PEN NEEDLES) 32G X 4 MM MISC 1 Device by Does not apply route 4 (four) times a day      Insulin Pen Needle (UNIFINE PENTIPS PLUS) 31G X 6 MM MISC 1 Device by Does not apply route 4 (four) times a day      Lancets MISC 1 Device by Does not apply route 4 (four) times a day      lisinopril (ZESTRIL) 40 mg tablet TAKE 1 TABLET TWICE A DAY BY ORAL ROUTE 180 tablet 1    Magnesium 400 MG TABS Take by mouth      meclizine (ANTIVERT) 25 mg tablet Take 1 tablet (25 mg total) by mouth every 8 (eight) hours as needed for dizziness 30 tablet 0    metoclopramide (REGLAN) 10 mg tablet Take 1 tablet (10 mg total) by mouth 4 (four) times a day for 7 days 28 tablet 0    metoprolol succinate (TOPROL-XL) 200 MG 24 hr tablet Take 1 tablet (200 mg total) by mouth daily 90 tablet 3    oxyCODONE (ROXICODONE) 10 MG TABS Take 1 tablet (10 mg total) by mouth 3 (three) times a day as needed for moderate painMax Daily Amount: 30 mg 90 tablet 0    pantoprazole (PROTONIX) 20 mg tablet Take 2 tablets (40 mg total) by mouth daily 30 tablet 3    pioglitazone (ACTOS) 45 mg tablet pioglitazone 45 mg tablet      potassium chloride (K-DUR,KLOR-CON) 20 mEq tablet Take 1 tablet (20 mEq total) by mouth 2 (two) times a day for 10 days 20 tablet 0    pregabalin (LYRICA) 75 mg capsule TAKE ONE CAPSULE EVERY  capsule 1    ranibizumab (LUCENTIS) 0 3 mg/0 05mL Lucentis 0 3 mg/0 05 mL intravitreal solution for injection      simvastatin (ZOCOR) 10 mg tablet TAKE 1 TABLET EVERY DAY BY ORAL ROUTE 90 tablet 1    spironolactone (ALDACTONE) 25 mg tablet spironolactone 25 mg tablet      TRELEGY ELLIPTA 100-62 5-25 MCG/INH inhaler       TRULICITY 1 5 TU/4 3ZP SOPN INJECT 0 5 ML EVERY WEEK BY SUBCUTANEOUS ROUTE  6 mL 1    zolpidem (AMBIEN) 10 mg tablet TAKE 1 TABLET (10 MG TOTAL) BY MOUTH DAILY AT BEDTIME AS NEEDED FOR SLEEP 30 tablet 0     No current facility-administered medications for this visit  No Known Allergies    Review of Systems   Constitutional: Negative for fever and unexpected weight change  HENT: Negative for nosebleeds and trouble swallowing  Eyes: Negative for visual disturbance  Respiratory: Negative for chest tightness and shortness of breath  Cardiovascular: Negative for chest pain, palpitations and leg swelling  Gastrointestinal: Negative for abdominal pain, constipation, diarrhea and nausea  Endocrine: Negative for cold intolerance  Genitourinary: Negative for dysuria and urgency  Musculoskeletal: Positive for arthralgias and back pain  Negative for joint swelling and myalgias  Skin: Negative for rash  Neurological: Positive for weakness and numbness  Negative for tremors, seizures and syncope  Hematological: Does not bruise/bleed easily  Psychiatric/Behavioral: Positive for sleep disturbance  Negative for hallucinations and suicidal ideas  Video Exam    There were no vitals filed for this visit  Physical Exam   Constitutional: She is oriented to person, place, and time  She appears well-developed and well-nourished  Eyes: Conjunctivae and EOM are normal    Neck: Normal range of motion  Pulmonary/Chest: Effort normal    Neurological: She is alert and oriented to person, place, and time  Psychiatric: She has a normal mood and affect  Her behavior is normal  Judgment and thought content normal         I spent 30 minutes directly with the patient during this visit      VIRTUAL VISIT [address] [place] [address],[address] acknowledges that she has consented to an online visit or consultation   She understands that the online visit is based solely on information provided by her, and that, in the absence of a face-to-face physical evaluation by the physician, the diagnosis she receives is both limited and provisional in terms of accuracy and completeness  This is not intended to replace a full medical face-to-face evaluation by the physician  Kelli Jarrell understands and accepts these terms

## 2020-07-23 DIAGNOSIS — Z72.0 TOBACCO ABUSE: Primary | ICD-10-CM

## 2020-07-23 DIAGNOSIS — Z79.4 TYPE 2 DIABETES MELLITUS WITH HYPERGLYCEMIA, WITH LONG-TERM CURRENT USE OF INSULIN (HCC): ICD-10-CM

## 2020-07-23 DIAGNOSIS — E11.65 TYPE 2 DIABETES MELLITUS WITH HYPERGLYCEMIA, WITH LONG-TERM CURRENT USE OF INSULIN (HCC): ICD-10-CM

## 2020-07-23 DIAGNOSIS — IMO0002 UNCONTROLLED TYPE 2 DIABETES MELLITUS WITH COMPLICATION, WITH LONG-TERM CURRENT USE OF INSULIN: ICD-10-CM

## 2020-07-23 RX ORDER — ALPRAZOLAM 0.5 MG/1
TABLET ORAL
Qty: 30 TABLET | Refills: 1 | Status: SHIPPED | OUTPATIENT
Start: 2020-07-23 | End: 2020-11-24

## 2020-07-23 RX ORDER — DULAGLUTIDE 1.5 MG/.5ML
INJECTION, SOLUTION SUBCUTANEOUS
Qty: 2 ML | Refills: 1 | Status: SHIPPED | OUTPATIENT
Start: 2020-07-23 | End: 2020-11-24

## 2020-07-23 RX ORDER — FLUTICASONE FUROATE, UMECLIDINIUM BROMIDE AND VILANTEROL TRIFENATATE 100; 62.5; 25 UG/1; UG/1; UG/1
POWDER RESPIRATORY (INHALATION)
Qty: 60 EACH | Refills: 1 | Status: SHIPPED | OUTPATIENT
Start: 2020-07-23 | End: 2021-05-17 | Stop reason: SDUPTHER

## 2020-07-23 NOTE — TELEPHONE ENCOUNTER
Spoke to pt, she has decided to move forward w SCS trial      Can you please place psych eval order? I will mail to pt w list of psych doctors  There is an MRI of T spine from 2018 in pt chart, if more recent MRI is needed, please place that order as well

## 2020-07-26 ENCOUNTER — HOSPITAL ENCOUNTER (EMERGENCY)
Facility: HOSPITAL | Age: 55
Discharge: HOME | End: 2020-07-26
Attending: EMERGENCY MEDICINE | Admitting: EMERGENCY MEDICINE
Payer: COMMERCIAL

## 2020-07-26 ENCOUNTER — APPOINTMENT (EMERGENCY)
Dept: RADIOLOGY | Facility: HOSPITAL | Age: 55
End: 2020-07-26
Payer: COMMERCIAL

## 2020-07-26 VITALS
SYSTOLIC BLOOD PRESSURE: 172 MMHG | OXYGEN SATURATION: 98 % | RESPIRATION RATE: 20 BRPM | HEART RATE: 87 BPM | TEMPERATURE: 98.6 F | DIASTOLIC BLOOD PRESSURE: 98 MMHG

## 2020-07-26 DIAGNOSIS — R73.9 HYPERGLYCEMIA: ICD-10-CM

## 2020-07-26 DIAGNOSIS — M25.50 JOINT PAIN: ICD-10-CM

## 2020-07-26 DIAGNOSIS — R60.9 PERIPHERAL EDEMA: Primary | ICD-10-CM

## 2020-07-26 DIAGNOSIS — G62.9 NEUROPATHY: ICD-10-CM

## 2020-07-26 LAB
ALBUMIN SERPL BCP-MCNC: 3.6 G/DL (ref 3.4–4.8)
ALP SERPL-CCNC: 88.8 U/L (ref 35–140)
ALT SERPL W P-5'-P-CCNC: 24 U/L (ref 5–54)
ANION GAP SERPL CALCULATED.3IONS-SCNC: 9 MMOL/L (ref 4–13)
AST SERPL W P-5'-P-CCNC: 17 U/L (ref 15–41)
BASOPHILS # BLD AUTO: 0.04 THOUSANDS/ΜL (ref 0–0.1)
BASOPHILS NFR BLD AUTO: 0 % (ref 0–1)
BILIRUB SERPL-MCNC: 0.38 MG/DL (ref 0.3–1.2)
BNP SERPL-MCNC: 20.7 PG/ML (ref 1–100)
BUN SERPL-MCNC: 14 MG/DL (ref 6–20)
CALCIUM SERPL-MCNC: 8.8 MG/DL (ref 8.4–10.2)
CHLORIDE SERPL-SCNC: 99 MMOL/L (ref 96–108)
CO2 SERPL-SCNC: 30 MMOL/L (ref 22–33)
CREAT SERPL-MCNC: 0.73 MG/DL (ref 0.4–1.1)
EOSINOPHIL # BLD AUTO: 0.17 THOUSAND/ΜL (ref 0–0.61)
EOSINOPHIL NFR BLD AUTO: 2 % (ref 0–6)
ERYTHROCYTE [DISTWIDTH] IN BLOOD BY AUTOMATED COUNT: 14.7 % (ref 11.6–15.1)
GFR SERPL CREATININE-BSD FRML MDRD: 108 ML/MIN/1.73SQ M
GLUCOSE SERPL-MCNC: 365 MG/DL (ref 65–140)
GLUCOSE SERPL-MCNC: 429 MG/DL (ref 65–140)
HCT VFR BLD AUTO: 39.7 % (ref 34.8–46.1)
HGB BLD-MCNC: 12.8 G/DL (ref 11.5–15.4)
IMM GRANULOCYTES # BLD AUTO: 0.04 THOUSAND/UL (ref 0–0.2)
IMM GRANULOCYTES NFR BLD AUTO: 0 % (ref 0–2)
LYMPHOCYTES # BLD AUTO: 3.08 THOUSANDS/ΜL (ref 0.6–4.47)
LYMPHOCYTES NFR BLD AUTO: 30 % (ref 14–44)
MCH RBC QN AUTO: 27.3 PG (ref 26.8–34.3)
MCHC RBC AUTO-ENTMCNC: 32.2 G/DL (ref 31.4–37.4)
MCV RBC AUTO: 85 FL (ref 82–98)
MONOCYTES # BLD AUTO: 0.88 THOUSAND/ΜL (ref 0.17–1.22)
MONOCYTES NFR BLD AUTO: 9 % (ref 4–12)
NEUTROPHILS # BLD AUTO: 6.04 THOUSANDS/ΜL (ref 1.85–7.62)
NEUTS SEG NFR BLD AUTO: 59 % (ref 43–75)
PLATELET # BLD AUTO: 292 THOUSANDS/UL (ref 149–390)
PMV BLD AUTO: 11 FL (ref 8.9–12.7)
POTASSIUM SERPL-SCNC: 3.2 MMOL/L (ref 3.5–5)
PROT SERPL-MCNC: 6.4 G/DL (ref 6.4–8.3)
RBC # BLD AUTO: 4.69 MILLION/UL (ref 3.81–5.12)
SODIUM SERPL-SCNC: 138 MMOL/L (ref 133–145)
TROPONIN I SERPL-MCNC: <0.03 NG/ML (ref 0–0.07)
WBC # BLD AUTO: 10.25 THOUSAND/UL (ref 4.31–10.16)

## 2020-07-26 PROCEDURE — 36415 COLL VENOUS BLD VENIPUNCTURE: CPT | Performed by: PHYSICIAN ASSISTANT

## 2020-07-26 PROCEDURE — 96375 TX/PRO/DX INJ NEW DRUG ADDON: CPT

## 2020-07-26 PROCEDURE — 96361 HYDRATE IV INFUSION ADD-ON: CPT

## 2020-07-26 PROCEDURE — 93005 ELECTROCARDIOGRAM TRACING: CPT

## 2020-07-26 PROCEDURE — 82948 REAGENT STRIP/BLOOD GLUCOSE: CPT

## 2020-07-26 PROCEDURE — 83880 ASSAY OF NATRIURETIC PEPTIDE: CPT | Performed by: PHYSICIAN ASSISTANT

## 2020-07-26 PROCEDURE — 99285 EMERGENCY DEPT VISIT HI MDM: CPT | Performed by: PHYSICIAN ASSISTANT

## 2020-07-26 PROCEDURE — 80053 COMPREHEN METABOLIC PANEL: CPT | Performed by: PHYSICIAN ASSISTANT

## 2020-07-26 PROCEDURE — 99284 EMERGENCY DEPT VISIT MOD MDM: CPT

## 2020-07-26 PROCEDURE — 85025 COMPLETE CBC W/AUTO DIFF WBC: CPT | Performed by: PHYSICIAN ASSISTANT

## 2020-07-26 PROCEDURE — 84484 ASSAY OF TROPONIN QUANT: CPT | Performed by: PHYSICIAN ASSISTANT

## 2020-07-26 PROCEDURE — 72170 X-RAY EXAM OF PELVIS: CPT

## 2020-07-26 PROCEDURE — 96374 THER/PROPH/DIAG INJ IV PUSH: CPT

## 2020-07-26 PROCEDURE — 71046 X-RAY EXAM CHEST 2 VIEWS: CPT

## 2020-07-26 RX ORDER — OXYCODONE HYDROCHLORIDE 5 MG/1
5 TABLET ORAL ONCE
Status: COMPLETED | OUTPATIENT
Start: 2020-07-26 | End: 2020-07-26

## 2020-07-26 RX ORDER — OXYCODONE HYDROCHLORIDE AND ACETAMINOPHEN 5; 325 MG/1; MG/1
1 TABLET ORAL EVERY 6 HOURS PRN
Qty: 12 TABLET | Refills: 0 | Status: SHIPPED | OUTPATIENT
Start: 2020-07-26 | End: 2020-07-26 | Stop reason: CLARIF

## 2020-07-26 RX ORDER — OXYCODONE HYDROCHLORIDE AND ACETAMINOPHEN 5; 325 MG/1; MG/1
1 TABLET ORAL EVERY 6 HOURS PRN
Qty: 12 TABLET | Refills: 0 | Status: SHIPPED | OUTPATIENT
Start: 2020-07-26 | End: 2020-07-29

## 2020-07-26 RX ORDER — POTASSIUM CHLORIDE 20 MEQ/1
40 TABLET, EXTENDED RELEASE ORAL ONCE
Status: COMPLETED | OUTPATIENT
Start: 2020-07-26 | End: 2020-07-26

## 2020-07-26 RX ORDER — KETOROLAC TROMETHAMINE 30 MG/ML
30 INJECTION, SOLUTION INTRAMUSCULAR; INTRAVENOUS ONCE
Status: COMPLETED | OUTPATIENT
Start: 2020-07-26 | End: 2020-07-26

## 2020-07-26 RX ADMIN — OXYCODONE HYDROCHLORIDE 5 MG: 5 TABLET ORAL at 18:14

## 2020-07-26 RX ADMIN — INSULIN HUMAN 10 UNITS: 100 INJECTION, SOLUTION PARENTERAL at 19:47

## 2020-07-26 RX ADMIN — POTASSIUM CHLORIDE 40 MEQ: 1500 TABLET, EXTENDED RELEASE ORAL at 20:23

## 2020-07-26 RX ADMIN — KETOROLAC TROMETHAMINE 30 MG: 30 INJECTION, SOLUTION INTRAMUSCULAR at 18:19

## 2020-07-26 RX ADMIN — SODIUM CHLORIDE 1000 ML: 0.9 INJECTION, SOLUTION INTRAVENOUS at 19:44

## 2020-07-26 NOTE — ED PROVIDER NOTES
History  Chief Complaint   Patient presents with    Pain     pt reports shoulder, hip, back pain; pt reports bilateral ankle swelling     Pt with Past Medical History Anxiety/Depression, aortic  aneurysm dilitation, Arthritis, Diabetes mellitus, Fibromyalgia, GERD (gastroesophageal reflux disease), Hyperlipidemia , HTN, Migraines, PSH: BACK SURGERY Lumbar epidural steroid injection, CARPAL TUNNEL RELEASE Left,  CHOLECYSTECTOMY, SADE, OVARIAN CYST REMOVAL, TUBAL LIGATION    Presents to emergency department complaining of not feeling well, exacerbation of her chronic left shoulder pain: states "since they adjusted it about a year ago, it has never been the same", left hip pain,  for which she had x-rays done in  but is unsure of the results, lower extremity swelling and pain especially with walking  Prior to Admission Medications   Prescriptions Last Dose Informant Patient Reported? Taking?    ADMELOG SOLOSTAR 100 units/mL injection pen  Self No No   Sig: INJECT 20 UNIT 3 TIMES A DAY BY SUBCUTANEOUS ROUTE   Patient taking differently: No sig reported   ALPRAZolam (XANAX) 0 5 mg tablet   No No   Sig: "ONE-HALF" TABLET TWO TIMES A DAY   BASAGLAR KWIKPEN 100 units/mL injection pen  Self Yes No   Sig: Inject under the skin daily at bedtime    CVS IRON 240 (27 Fe) MG tablet  Self No No   Sig: TAKE 1 TABLET (240 MG TOTAL) BY MOUTH 3 (THREE) TIMES A DAY WITH MEALS   Insulin Pen Needle (ULTICARE MICRO PEN NEEDLES) 32G X 4 MM MISC  Self Yes No   Si Device by Does not apply route 4 (four) times a day   Insulin Pen Needle (UNIFINE PENTIPS PLUS) 31G X 6 MM MISC  Self Yes No   Si Device by Does not apply route 4 (four) times a day   Lancets MISC  Self Yes No   Si Device by Does not apply route 4 (four) times a day   Magnesium 400 MG TABS  Self Yes No   Sig: Take by mouth   TRELEGY ELLIPTA 100-62 5-25 MCG/INH inhaler   No No   Sig: ONE PUFF EVERY DAY   TRULICITY 1 5 MK/4 0AJ SOPN   No No   Sig: INJECT 0 5 ML EVERY WEEK BY SUBCUTANEOUS ROUTE    amLODIPine (NORVASC) 10 mg tablet  Self Yes No   Sig: Take 10 mg by mouth daily   cyclobenzaprine (FLEXERIL) 10 mg tablet  Self No No   Sig: Take 1 tablet (10 mg total) by mouth 3 (three) times a day as needed for muscle spasms for up to 10 days   diclofenac (VOLTAREN) 75 mg EC tablet  Self No No   Sig: Take 1 tablet (75 mg total) by mouth 2 (two) times a day   doxazosin (CARDURA) 4 mg tablet  Self Yes No   Sig: Take 4 mg by mouth daily   ergocalciferol (VITAMIN D2) 50,000 units  Self No No   Sig: Take 1 capsule (50,000 Units total) by mouth 2 (two) times a week   hydrochlorothiazide (HYDRODIURIL) 25 mg tablet  Self No No   Sig: TAKE 1 TABLET EVERY DAY BY ORAL ROUTE   ibuprofen (MOTRIN) 800 mg tablet  Self Yes No   Sig: ibuprofen 800 mg tablet   lisinopril (ZESTRIL) 40 mg tablet  Self No No   Sig: TAKE 1 TABLET TWICE A DAY BY ORAL ROUTE   meclizine (ANTIVERT) 25 mg tablet  Self No No   Sig: Take 1 tablet (25 mg total) by mouth every 8 (eight) hours as needed for dizziness   metoclopramide (REGLAN) 10 mg tablet   No No   Sig: Take 1 tablet (10 mg total) by mouth 4 (four) times a day for 7 days   metoprolol succinate (TOPROL-XL) 200 MG 24 hr tablet   No No   Sig: Take 1 tablet (200 mg total) by mouth daily   oxyCODONE (ROXICODONE) 10 MG TABS  Self No No   Sig: Take 1 tablet (10 mg total) by mouth 3 (three) times a day as needed for moderate painMax Daily Amount: 30 mg   pantoprazole (PROTONIX) 20 mg tablet  Self No No   Sig: Take 2 tablets (40 mg total) by mouth daily   pioglitazone (ACTOS) 45 mg tablet  Self Yes No   Sig: pioglitazone 45 mg tablet   potassium chloride (K-DUR,KLOR-CON) 20 mEq tablet   No No   Sig: Take 1 tablet (20 mEq total) by mouth 2 (two) times a day for 10 days   pregabalin (LYRICA) 75 mg capsule  Self No No   Sig: TAKE ONE CAPSULE EVERY DAY   ranibizumab (LUCENTIS) 0 3 mg/0 05mL  Self Yes No   Sig: Lucentis 0 3 mg/0 05 mL intravitreal solution for injection simvastatin (ZOCOR) 10 mg tablet  Self No No   Sig: TAKE 1 TABLET EVERY DAY BY ORAL ROUTE   spironolactone (ALDACTONE) 25 mg tablet  Self Yes No   Sig: spironolactone 25 mg tablet   zolpidem (AMBIEN) 10 mg tablet  Self No No   Sig: TAKE 1 TABLET (10 MG TOTAL) BY MOUTH DAILY AT BEDTIME AS NEEDED FOR SLEEP      Facility-Administered Medications: None       Past Medical History:   Diagnosis Date    Anxiety     Aortic aneurysm (HCC)     Arthritis     Depression     Diabetes mellitus (HCC)     Fibromyalgia     GERD (gastroesophageal reflux disease)     H/O cardiovascular stress test 2018    no ischemia  EF 70%   H/O echocardiogram 2019    EF 60%  Mild LVH  trivial effusion   Hyperlipidemia     Hypertension     Migraines     Psychiatric disorder     anxiety       Past Surgical History:   Procedure Laterality Date    BACK SURGERY      Lumbar epidural steroid injection    CARPAL TUNNEL RELEASE Left      SECTION      CHOLECYSTECTOMY      COLONOSCOPY      incomplete    HYSTERECTOMY      Total    OVARIAN CYST REMOVAL      TUBAL LIGATION         Family History   Problem Relation Age of Onset    Hypertension Mother    Ardeth Needs Arthritis Mother     Diabetes Father     Other Sister         renal cell carcinoma    Diabetes Other      I have reviewed and agree with the history as documented  E-Cigarette/Vaping    E-Cigarette Use Never User      E-Cigarette/Vaping Substances     Social History     Tobacco Use    Smoking status: Current Every Day Smoker     Packs/day: 1 00     Years: 30 00     Pack years: 30 00     Types: Cigarettes    Smokeless tobacco: Never Used   Substance Use Topics    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   Drug use: Not Currently     Types: Cocaine       Review of Systems   Constitutional: Negative for chills and fever  HENT: Negative for ear pain, hearing loss, mouth sores, rhinorrhea, sore throat and trouble swallowing      Eyes: Negative for visual disturbance  Respiratory: Negative for cough and shortness of breath  Cardiovascular: Positive for leg swelling  Negative for chest pain  Gastrointestinal: Negative for abdominal pain, constipation, diarrhea, nausea and vomiting  Genitourinary: Negative for dysuria, frequency, vaginal bleeding and vaginal discharge  Musculoskeletal: Positive for arthralgias, joint swelling and myalgias  Skin: Negative for color change, pallor and rash  Neurological: Negative for dizziness, weakness and light-headedness  Hematological: Does not bruise/bleed easily  Psychiatric/Behavioral: Negative for behavioral problems  Physical Exam  Physical Exam   Constitutional: She is oriented to person, place, and time  She appears well-developed and well-nourished  She appears distressed  HENT:   Head: Normocephalic and atraumatic  Right Ear: External ear normal    Left Ear: External ear normal    Mouth/Throat: Oropharynx is clear and moist    Eyes: Pupils are equal, round, and reactive to light  Conjunctivae are normal    Neck: Normal range of motion  Cardiovascular: Normal rate, regular rhythm and normal heart sounds  Pulmonary/Chest: Effort normal and breath sounds normal  No respiratory distress  She has no wheezes  Abdominal: Soft  Bowel sounds are normal  There is no tenderness  Musculoskeletal: Normal range of motion  She exhibits edema and tenderness ( 1+ pitting edema)  Mild diffuse lower leg tenderness B/L   Lymphadenopathy:     She has no cervical adenopathy  Neurological: She is alert and oriented to person, place, and time  Skin: Skin is warm and dry  No rash noted  Psychiatric: She has a normal mood and affect  Her behavior is normal    Nursing note and vitals reviewed        Vital Signs  ED Triage Vitals [07/26/20 1758]   Temperature Pulse Respirations Blood Pressure SpO2   98 6 °F (37 °C) 87 20 (!) 172/98 98 %      Temp Source Heart Rate Source Patient Position - Orthostatic VS BP Location FiO2 (%)   Tympanic Monitor Lying Left arm --      Pain Score       8           Vitals:    07/26/20 1758   BP: (!) 172/98   Pulse: 87   Patient Position - Orthostatic VS: Lying         Visual Acuity      ED Medications  Medications   sodium chloride 0 9 % bolus 1,000 mL (1,000 mL Intravenous New Bag 7/26/20 1944)   ketorolac (TORADOL) injection 30 mg (30 mg Intravenous Given 7/26/20 1819)   oxyCODONE (ROXICODONE) IR tablet 5 mg (5 mg Oral Given 7/26/20 1814)   insulin regular (HumuLIN R,NovoLIN R) injection 10 Units (10 Units Intravenous Given 7/26/20 1947)   potassium chloride (K-DUR,KLOR-CON) CR tablet 40 mEq (40 mEq Oral Given 7/26/20 2023)       Diagnostic Studies  Results Reviewed     Procedure Component Value Units Date/Time    Fingerstick Glucose (POCT) [[id number]]  (Abnormal) Collected:  07/26/20 2034    Lab Status:  Final result Updated:  07/26/20 2036     POC Glucose 365 mg/dl     B-Type Natriuretic Peptide [facility] ONLY) [[id number]]  (Normal) Collected:  07/26/20 1821    Lab Status:  Final result Specimen:  Blood from Arm, Left Updated:  07/26/20 1852     BNP 20 7 pg/mL     Troponin I [[id number]]  (Normal) Collected:  07/26/20 1821    Lab Status:  Final result Specimen:  Blood from Arm, Left Updated:  07/26/20 1847     Troponin I <0 03 ng/mL     Comprehensive metabolic panel [[id number]]  (Abnormal) Collected:  07/26/20 1821    Lab Status:  Final result Specimen:  Blood from Arm, Left Updated:  07/26/20 1845     Sodium 138 mmol/L      Potassium 3 2 mmol/L      Chloride 99 mmol/L      CO2 30 mmol/L      ANION GAP 9 mmol/L      BUN 14 mg/dL      Creatinine 0 73 mg/dL      Glucose 429 mg/dL      Calcium 8 8 mg/dL      AST 17 U/L      ALT 24 U/L      Alkaline Phosphatase 88 8 U/L      Total Protein 6 4 g/dL      Albumin 3 6 g/dL      Total Bilirubin 0 38 mg/dL      eGFR 108 ml/min/1 73sq m     Narrative:       [place] guidelines for Chronic Kidney Disease (CKD):     Stage 1 with normal or high GFR (GFR > 90 mL/min/1 73 square meters)    Stage 2 Mild CKD (GFR = 60-89 mL/min/1 73 square meters)    Stage 3A Moderate CKD (GFR = 45-59 mL/min/1 73 square meters)    Stage 3B Moderate CKD (GFR = 30-44 mL/min/1 73 square meters)    Stage 4 Severe CKD (GFR = 15-29 mL/min/1 73 square meters)    Stage 5 End Stage CKD (GFR <15 mL/min/1 73 square meters)  Note: GFR calculation is accurate only with a steady state creatinine    CBC and differential [[id number]]  (Abnormal) Collected:  07/26/20 1821    Lab Status:  Final result Specimen:  Blood from Arm, Left Updated:  07/26/20 1826     WBC 10 25 Thousand/uL      RBC 4 69 Million/uL      Hemoglobin 12 8 g/dL      Hematocrit 39 7 %      MCV 85 fL      MCH 27 3 pg      MCHC 32 2 g/dL      RDW 14 7 %      MPV 11 0 fL      Platelets 046 Thousands/uL      Neutrophils Relative 59 %      Immat GRANS % 0 %      Lymphocytes Relative 30 %      Monocytes Relative 9 %      Eosinophils Relative 2 %      Basophils Relative 0 %      Neutrophils Absolute 6 04 Thousands/µL      Immature Grans Absolute 0 04 Thousand/uL      Lymphocytes Absolute 3 08 Thousands/µL      Monocytes Absolute 0 88 Thousand/µL      Eosinophils Absolute 0 17 Thousand/µL      Basophils Absolute 0 04 Thousands/µL                  XR chest 2 views    (Results Pending)   XR pelvis ap only 1 or 2 views    (Results Pending)              Procedures  ECG 12 Lead Documentation Only  Date/Time: 7/26/2020 6:38 PM  Performed by: Lane Balderrama PA-C  Authorized by: Lane Balderrama PA-C     ECG reviewed by me, the ED Provider: yes    Patient location:  ED  Previous ECG:     Previous ECG comparison: Compared to old EKG from 06/02/2020    Rate:     ECG rate:  82    ECG rate assessment: normal    Rhythm:     Rhythm: sinus rhythm    Comments:      Normal sinus rhythm at 82, LVH, nonspecific ST T wave changes, no acute ischemia, no changes compared to old EKG from 06/02/2020             ED Course                                             MDM  Number of Diagnoses or Management Options  Hyperglycemia:   Joint pain:   Neuropathy:   Peripheral edema:   Diagnosis management comments: Arely Ortega from 6/2: IMPRESSION: Similar aneurysmal dilatation of ascending aorta up to 4 3 cm  No evidence of dissection  Worsening 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 with now 2 2 cm area of focal occlusion where there was previously flow/contrast Similar findings of lung bullae and paraseptal emphysema  Pt with chronic left should and hip pain, no acute pathology; slight hypokalemia, replaced, LE pain may be neuropathy, due to poorly controlled DM, and hyperglycemia here; no acute chf or cardiac causes  Stable and improved for DC, has insulin at home  Amount and/or Complexity of Data Reviewed  Clinical lab tests: reviewed  Tests in the radiology section of CPT®: reviewed          Disposition  Final diagnoses:   Peripheral edema   Joint pain   Neuropathy   Hyperglycemia     Time reflects when diagnosis was documented in both MDM as applicable and the Disposition within this note     Time User Action Codes Description Comment    7/26/2020  8:16 PM Maryanne Catena Add [R60 9] Peripheral edema     7/26/2020  8:16 PM Maryanne Catena Add [M25 50] Joint pain     7/26/2020  8:16 PM Maryanne Catena Add [G62 9] Neuropathy     7/26/2020  8:17 PM Maryanne Catena Add [R73 9] Hyperglycemia       ED Disposition     ED Disposition Condition Date/Time Comment    Discharge Stable Sun Jul 26, 2020  8:41 PM Jose Yousif discharge to home/self care              Follow-up Information     Follow up With Specialties Details Why Contact Info    Arianna Hummel MD Family Medicine  As needed [address] [facility] [place]  [address]  [facility] [address]  [phone]            Patient's Medications   Discharge Prescriptions    OXYCODONE-ACETAMINOPHEN (PERCOCET) 5-325 MG PER TABLET    Take 1 tablet by mouth every 6 (six) hours as needed for moderate pain for up to 3 daysMax Daily Amount: 4 tablets       Start Date: 7/26/2020 End Date: 7/29/2020       Order Dose: 1 tablet       Quantity: 12 tablet    Refills: 0     No discharge procedures on file      PDMP Review       Value Time User    PDMP Reviewed  Yes 6/15/2020  9:34 AM Albaro Barrios MD          ED Provider  Electronically Signed by           Chino Jung PA-C  07/26/20 204

## 2020-07-27 NOTE — DISCHARGE INSTRUCTIONS
Take pain medication  as directed    Follow-up with your PCP for further evaluation of hyperglycemia and neuropathy evaluation

## 2020-07-27 NOTE — ED NOTES
D/c reviewed with pt  Pt verbalized understanding and has no further questions at this time  Pt ambulatory off unit with steady gait       [address], RN  07/26/20 1686

## 2020-07-28 NOTE — TELEPHONE ENCOUNTER
Printed psych order, list of docs and scripting and mailed to pt, she is aware  Emailed Prerna Turpin and Maris Carrizales from [facility] to contact pt for education

## 2020-07-29 LAB
ATRIAL RATE: 82 BPM
P AXIS: 31 DEGREES
PR INTERVAL: 183 MS
QRS AXIS: -49 DEGREES
QRSD INTERVAL: 116 MS
QT INTERVAL: 413 MS
QTC INTERVAL: 483 MS
T WAVE AXIS: -17 DEGREES
VENTRICULAR RATE: 82 BPM

## 2020-07-29 PROCEDURE — 93010 ELECTROCARDIOGRAM REPORT: CPT | Performed by: INTERNAL MEDICINE

## 2020-07-31 NOTE — TELEPHONE ENCOUNTER
I began to inform pt that Dr Michele Lovelace rec she try taking high doses of Tylenol 1000 mg TID and she interrupted me and said she's not going to deal with him anymore, she's going to have to find another doctor! I ask for pt to confirm that her plan is to find another doctor? Pt said she has told the doctor that tylenol doesn't work, the lyrica 150 mg twice a day doesn't work either, "what does he want me to do, I have had this pain for 3 yrs "  Pt said we are working on getting her a stimulator  I asked pt what is it that she wants FQ to do for her pain and she said she wants Percocet that she used to take  She said "I know myself and that's what has worked the best in the past  She said she takes it the way she is suppose to "  I told pt I would reach to FQ to see if he would be willing to prescribe percocet for her

## 2020-07-31 NOTE — TELEPHONE ENCOUNTER
Pt called and said her pain is "worse than ever" and wants to know what she can take for pain  Pt reports taking 150mg twice a day for about one week  Pt said it helps with leg spasms at night, but it does not help the pain  Pt is rating pain 10/10  Please advise

## 2020-08-03 ENCOUNTER — LAB (OUTPATIENT)
Dept: LAB | Facility: CLINIC | Age: 55
End: 2020-08-03
Payer: COMMERCIAL

## 2020-08-03 DIAGNOSIS — I10 UNCONTROLLED HYPERTENSION: ICD-10-CM

## 2020-08-03 PROCEDURE — 36415 COLL VENOUS BLD VENIPUNCTURE: CPT

## 2020-08-03 PROCEDURE — 82088 ASSAY OF ALDOSTERONE: CPT

## 2020-08-03 PROCEDURE — 83540 ASSAY OF IRON: CPT

## 2020-08-03 PROCEDURE — 83835 ASSAY OF METANEPHRINES: CPT

## 2020-08-03 PROCEDURE — 82728 ASSAY OF FERRITIN: CPT

## 2020-08-03 PROCEDURE — 84244 ASSAY OF RENIN: CPT

## 2020-08-03 PROCEDURE — 83550 IRON BINDING TEST: CPT

## 2020-08-03 NOTE — TELEPHONE ENCOUNTER
NANDOI:  I s/w pt and informed her that Dr Indigo Shaikh is not willing to prescribe her any percocet  Pt said ok, then she'll continue to get them where she has been

## 2020-08-04 LAB
FERRITIN SERPL-MCNC: 136 NG/ML (ref 8–388)
IRON SERPL-MCNC: 51 UG/DL (ref 50–170)
TIBC SERPL-MCNC: 376 UG/DL (ref 250–450)

## 2020-08-05 ENCOUNTER — APPOINTMENT (EMERGENCY)
Dept: CT IMAGING | Facility: HOSPITAL | Age: 55
End: 2020-08-05
Payer: COMMERCIAL

## 2020-08-05 ENCOUNTER — HOSPITAL ENCOUNTER (EMERGENCY)
Facility: HOSPITAL | Age: 55
Discharge: HOME | End: 2020-08-05
Attending: EMERGENCY MEDICINE
Payer: COMMERCIAL

## 2020-08-05 VITALS
DIASTOLIC BLOOD PRESSURE: 96 MMHG | RESPIRATION RATE: 22 BRPM | BODY MASS INDEX: 28.06 KG/M2 | HEART RATE: 117 BPM | OXYGEN SATURATION: 97 % | SYSTOLIC BLOOD PRESSURE: 174 MMHG | HEIGHT: 70 IN | WEIGHT: 196 LBS

## 2020-08-05 DIAGNOSIS — L03.211 FACIAL CELLULITIS: Primary | ICD-10-CM

## 2020-08-05 LAB
ANION GAP SERPL CALCULATED.3IONS-SCNC: 8 MMOL/L (ref 4–13)
BUN SERPL-MCNC: 15 MG/DL (ref 6–20)
CALCIUM SERPL-MCNC: 8.6 MG/DL (ref 8.4–10.2)
CHLORIDE SERPL-SCNC: 103 MMOL/L (ref 96–108)
CO2 SERPL-SCNC: 28 MMOL/L (ref 22–33)
CREAT SERPL-MCNC: 0.58 MG/DL (ref 0.4–1.1)
GFR SERPL CREATININE-BSD FRML MDRD: 121 ML/MIN/1.73SQ M
GLUCOSE SERPL-MCNC: 289 MG/DL (ref 65–140)
POTASSIUM SERPL-SCNC: 3.8 MMOL/L (ref 3.5–5)
SODIUM SERPL-SCNC: 139 MMOL/L (ref 133–145)

## 2020-08-05 PROCEDURE — 99284 EMERGENCY DEPT VISIT MOD MDM: CPT | Performed by: EMERGENCY MEDICINE

## 2020-08-05 PROCEDURE — G1004 CDSM NDSC: HCPCS

## 2020-08-05 PROCEDURE — 99284 EMERGENCY DEPT VISIT MOD MDM: CPT

## 2020-08-05 PROCEDURE — 80048 BASIC METABOLIC PNL TOTAL CA: CPT | Performed by: EMERGENCY MEDICINE

## 2020-08-05 PROCEDURE — 36415 COLL VENOUS BLD VENIPUNCTURE: CPT | Performed by: EMERGENCY MEDICINE

## 2020-08-05 PROCEDURE — 70481 CT ORBIT/EAR/FOSSA W/DYE: CPT

## 2020-08-05 PROCEDURE — 96374 THER/PROPH/DIAG INJ IV PUSH: CPT

## 2020-08-05 RX ORDER — ERYTHROMYCIN 5 MG/G
OINTMENT OPHTHALMIC
Qty: 3.5 G | Refills: 0 | Status: SHIPPED | OUTPATIENT
Start: 2020-08-05 | End: 2020-11-17 | Stop reason: ALTCHOICE

## 2020-08-05 RX ORDER — CEPHALEXIN 500 MG/1
500 CAPSULE ORAL EVERY 6 HOURS SCHEDULED
Qty: 40 CAPSULE | Refills: 0 | Status: SHIPPED | OUTPATIENT
Start: 2020-08-05 | End: 2020-08-15

## 2020-08-05 RX ORDER — TETRACAINE HYDROCHLORIDE 5 MG/ML
1 SOLUTION OPHTHALMIC ONCE
Status: COMPLETED | OUTPATIENT
Start: 2020-08-05 | End: 2020-08-05

## 2020-08-05 RX ORDER — IBUPROFEN 800 MG/1
800 TABLET ORAL EVERY 6 HOURS PRN
Qty: 30 TABLET | Refills: 0 | Status: SHIPPED | OUTPATIENT
Start: 2020-08-05 | End: 2021-09-07 | Stop reason: ALTCHOICE

## 2020-08-05 RX ORDER — KETOROLAC TROMETHAMINE 30 MG/ML
30 INJECTION, SOLUTION INTRAMUSCULAR; INTRAVENOUS ONCE
Status: COMPLETED | OUTPATIENT
Start: 2020-08-05 | End: 2020-08-05

## 2020-08-05 RX ADMIN — IOHEXOL 85 ML: 350 INJECTION, SOLUTION INTRAVENOUS at 11:47

## 2020-08-05 RX ADMIN — KETOROLAC TROMETHAMINE 30 MG: 30 INJECTION, SOLUTION INTRAMUSCULAR at 10:18

## 2020-08-05 RX ADMIN — FLUORESCEIN SODIUM 1 STRIP: 1 STRIP OPHTHALMIC at 09:41

## 2020-08-05 RX ADMIN — TETRACAINE HYDROCHLORIDE 1 DROP: 5 SOLUTION OPHTHALMIC at 09:41

## 2020-08-05 NOTE — ED PROVIDER NOTES
History  Chief Complaint   Patient presents with    Eye Pain     Patient here with c/o right eye pain, itching  and discharge  Symptoms started yesterday   Patient has tried hot compresses to the area and OTC eye drops with no relief  Patient is a 52-year-old female  She presents to the emergency room with right eye pain that started yesterday in is worse today  It started with some itching  She noted some clear but thick discharge  No redness  She has a history of diabetic retinopathy with hemorrhages  She has chronic blurred vision in that eye  It is not worse  She thinks she might have a stye there is no trauma  She is mostly complaining of severe right eye pain  No photophobia  No fevers  She denies any possible foreign body or chemical exposure  Symptoms are severe  There been no relieving factors  Prior to Admission Medications   Prescriptions Last Dose Informant Patient Reported? Taking?    ADMELOG SOLOSTAR 100 units/mL injection pen 2020 at Unknown time Self No Yes   Sig: INJECT 20 UNIT 3 TIMES A DAY BY SUBCUTANEOUS ROUTE   Patient taking differently: No sig reported   ALPRAZolam (XANAX) 0 5 mg tablet More than a month at Unknown time  No No   Sig: "ONE-HALF" TABLET TWO TIMES A DAY   BASAGLAR KWIKPEN 100 units/mL injection pen 2020 at Unknown time Self Yes Yes   Sig: Inject under the skin daily at bedtime    [facility] IRON 240 (27 Fe) MG tablet 2020 at Unknown time Self No Yes   Sig: TAKE 1 TABLET (240 MG TOTAL) BY MOUTH 3 (THREE) TIMES A DAY WITH MEALS   Insulin Pen Needle (ULTICARE MICRO PEN NEEDLES) 32G X 4 MM MISC 2020 at Unknown time Self Yes Yes   Si Device by Does not apply route 4 (four) times a day   Insulin Pen Needle (UNIFINE PENTIPS PLUS) 31G X 6 MM MISC 2020 at Unknown time Self Yes Yes   Si Device by Does not apply route 4 (four) times a day   Lancets [facility] 2020 at Unknown time Self Yes Yes   Si Device by Does not apply route 4 (four) times a day   Magnesium 400 MG TABS 8/4/2020 at Unknown time Self Yes Yes   Sig: Take by mouth   TRELEGY ELLIPTA 100-62 5-25 MCG/INH inhaler 8/4/2020 at Unknown time  No Yes   Sig: ONE PUFF EVERY DAY   TRULICITY 1 5 DD/6 4VD SOPN 8/4/2020 at Unknown time  No Yes   Sig: INJECT 0 5 ML EVERY WEEK BY SUBCUTANEOUS ROUTE    amLODIPine (NORVASC) 10 mg tablet 8/4/2020 at Unknown time Self Yes Yes   Sig: Take 10 mg by mouth daily   cyclobenzaprine (FLEXERIL) 10 mg tablet  Self No No   Sig: Take 1 tablet (10 mg total) by mouth 3 (three) times a day as needed for muscle spasms for up to 10 days   diclofenac (VOLTAREN) 75 mg EC tablet 8/4/2020 at Unknown time Self No Yes   Sig: Take 1 tablet (75 mg total) by mouth 2 (two) times a day   doxazosin (CARDURA) 4 mg tablet 8/4/2020 at Unknown time Self Yes Yes   Sig: Take 4 mg by mouth daily   ergocalciferol (VITAMIN D2) 50,000 units Past Week at Unknown time Self No Yes   Sig: Take 1 capsule (50,000 Units total) by mouth 2 (two) times a week   hydrochlorothiazide (HYDRODIURIL) 25 mg tablet 8/4/2020 at Unknown time Self No Yes   Sig: TAKE 1 TABLET EVERY DAY BY ORAL ROUTE   ibuprofen (MOTRIN) 800 mg tablet Not Taking at Unknown time Self Yes No   Sig: ibuprofen 800 mg tablet   lisinopril (ZESTRIL) 40 mg tablet 8/4/2020 at Unknown time Self No Yes   Sig: TAKE 1 TABLET TWICE A DAY BY ORAL ROUTE   meclizine (ANTIVERT) 25 mg tablet 8/4/2020 at Unknown time Self No Yes   Sig: Take 1 tablet (25 mg total) by mouth every 8 (eight) hours as needed for dizziness   metoprolol succinate (TOPROL-XL) 200 MG 24 hr tablet 8/4/2020 at Unknown time  No Yes   Sig: Take 1 tablet (200 mg total) by mouth daily   oxyCODONE (ROXICODONE) 10 MG TABS Not Taking at Unknown time Self No No   Sig: Take 1 tablet (10 mg total) by mouth 3 (three) times a day as needed for moderate painMax Daily Amount: 30 mg   Patient not taking: Reported on 8/5/2020   pantoprazole (PROTONIX) 20 mg tablet 8/4/2020 at Unknown time Self No Yes   Sig: Take 2 tablets (40 mg total) by mouth daily   pioglitazone (ACTOS) 45 mg tablet 2020 at Unknown time Self Yes Yes   Sig: pioglitazone 45 mg tablet   potassium chloride (K-DUR,KLOR-CON) 20 mEq tablet   No No   Sig: Take 1 tablet (20 mEq total) by mouth 2 (two) times a day for 10 days   pregabalin (LYRICA) 75 mg capsule 2020 at Unknown time Self No Yes   Sig: TAKE ONE CAPSULE EVERY DAY   ranibizumab (LUCENTIS) 0 3 mg/0 05mL 2020 at Unknown time Self Yes Yes   Sig: Lucentis 0 3 mg/0 05 mL intravitreal solution for injection   simvastatin (ZOCOR) 10 mg tablet 2020 at Unknown time Self No Yes   Sig: TAKE 1 TABLET EVERY DAY BY ORAL ROUTE   spironolactone (ALDACTONE) 25 mg tablet Not Taking at Unknown time Self Yes No   Sig: spironolactone 25 mg tablet   zolpidem (AMBIEN) 10 mg tablet More than a month at Unknown time Self No No   Sig: TAKE 1 TABLET (10 MG TOTAL) BY MOUTH DAILY AT BEDTIME AS NEEDED FOR SLEEP      Facility-Administered Medications: None       Past Medical History:   Diagnosis Date    Anxiety     Aortic aneurysm (HCC)     Arthritis     Depression     Diabetes mellitus (HCC)     Fibromyalgia     GERD (gastroesophageal reflux disease)     H/O cardiovascular stress test 2018    no ischemia  EF 70%   H/O echocardiogram 2019    EF 60%  Mild LVH  trivial effusion      Hyperlipidemia     Hypertension     Migraines     Psychiatric disorder     anxiety       Past Surgical History:   Procedure Laterality Date    BACK SURGERY      Lumbar epidural steroid injection    CARPAL TUNNEL RELEASE Left      SECTION      CHOLECYSTECTOMY      COLONOSCOPY      incomplete    HYSTERECTOMY      Total    OVARIAN CYST REMOVAL      TUBAL LIGATION         Family History   Problem Relation Age of Onset    Hypertension Mother    Ria Clock Arthritis Mother     Diabetes Father     Other Sister         renal cell carcinoma    Diabetes Other      I have reviewed and agree with the history as documented  E-Cigarette/Vaping    E-Cigarette Use Never User      E-Cigarette/Vaping Substances     Social History     Tobacco Use    Smoking status: Current Every Day Smoker     Packs/day: 1 00     Years: 30 00     Pack years: 30 00     Types: Cigarettes    Smokeless tobacco: Never Used   Substance Use Topics    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   Drug use: Not Currently     Types: Cocaine       Review of Systems   Constitutional: Negative for chills and fever  Eyes: Positive for pain, discharge and itching  Negative for photophobia, redness and visual disturbance  All other systems reviewed and are negative  Physical Exam  Physical Exam  Vitals signs reviewed  Constitutional:       General: She is not in acute distress  HENT:      Head: Normocephalic and atraumatic  Nose: Nose normal       Mouth/Throat:      Mouth: Mucous membranes are moist    Eyes:      General: No scleral icterus  Right eye: No discharge  Left eye: No discharge  Extraocular Movements: Extraocular movements intact  Conjunctiva/sclera: Conjunctivae normal       Pupils: Pupils are equal, round, and reactive to light  Comments: Grossly the eye appears normal   There is no injection  No drainage  No definite swelling to the eyelids  There might be some minimal swelling to the lower eyelid but this is unclear  I do not see any definite stye  No corneal foreign body  No foreign body under the lids  Fluorescein staining is negative  Pressure in the right eye is 29  I am unable to visualize the fundus well  Patient has severe tenderness with palpation of the periorbital area  Especially inferiorly and lateral to the eye  There is no swelling there  No erythema  No fluctuance  No bruising, crepitus or step-off  Patient probably cannot do the eye chart but refer to nursing notes for this       Neck:      Musculoskeletal: Normal range of motion and neck supple  Pulmonary:      Effort: Pulmonary effort is normal  No respiratory distress  Musculoskeletal:         General: No deformity or signs of injury  Skin:     General: Skin is warm and dry  Findings: No rash  Neurological:      General: No focal deficit present  Mental Status: She is alert and oriented to person, place, and time  Psychiatric:         Mood and Affect: Mood normal          Behavior: Behavior normal          Vital Signs  ED Triage Vitals   Temp Pulse Respirations Blood Pressure SpO2   -- 08/05/20 0930 08/05/20 0930 08/05/20 0930 08/05/20 0930    (!) 120 16 (!) 196/97 99 %      Temp src Heart Rate Source Patient Position - Orthostatic VS BP Location FiO2 (%)   -- 08/05/20 1014 08/05/20 0930 08/05/20 0930 --    Monitor Lying Left arm       Pain Score       08/05/20 1018       8           Vitals:    08/05/20 0930 08/05/20 1014 08/05/20 1237 08/05/20 1327   BP: (!) 196/97 (!) 195/103 (!) 186/104 (!) 174/96   Pulse: (!) 120 (!) 123 (!) 122 (!) 117   Patient Position - Orthostatic VS: Lying Lying Lying Sitting         Visual Acuity  Visual Acuity      Most Recent Value   Visual acuity R eye is  Other   Visual acuity Left eye is  Other   Visual acuity in both eyes is  Other   Unable to obtain visual acuity due to  pain and discomfort   Wearing corrective eyewear/lenses?   Yes          ED Medications  Medications   fluorescein sodium sterile ophthalmic strip 1 strip (1 strip Both Eyes Given 8/5/20 0941)   tetracaine 0 5 % ophthalmic solution 1 drop (1 drop Both Eyes Given 8/5/20 0941)   ketorolac (TORADOL) injection 30 mg (30 mg Intravenous Given 8/5/20 1018)   iohexol (OMNIPAQUE) 350 MG/ML injection (MULTI-DOSE) 100 mL (85 mL Intravenous Given 8/5/20 1147)       Diagnostic Studies  Results Reviewed     Procedure Component Value Units Date/Time    Basic metabolic panel [[id number]]  (Abnormal) Collected:  08/05/20 1051    Lab Status:  Final result Specimen:  Blood from Arm, Right Updated:  08/05/20 1131     Sodium 139 mmol/L      Potassium 3 8 mmol/L      Chloride 103 mmol/L      CO2 28 mmol/L      ANION GAP 8 mmol/L      BUN 15 mg/dL      Creatinine 0 58 mg/dL      Glucose 289 mg/dL      Calcium 8 6 mg/dL      eGFR 121 ml/min/1 73sq m     Narrative:       [place] guidelines for Chronic Kidney Disease (CKD):     Stage 1 with normal or high GFR (GFR > 90 mL/min/1 73 square meters)    Stage 2 Mild CKD (GFR = 60-89 mL/min/1 73 square meters)    Stage 3A Moderate CKD (GFR = 45-59 mL/min/1 73 square meters)    Stage 3B Moderate CKD (GFR = 30-44 mL/min/1 73 square meters)    Stage 4 Severe CKD (GFR = 15-29 mL/min/1 73 square meters)    Stage 5 End Stage CKD (GFR <15 mL/min/1 73 square meters)  Note: GFR calculation is accurate only with a steady state creatinine                 CT orbits/temporal bones/skull base w contrast   Final Result by Clari Inman MD (08/05 1227)      Minor exophthalmos  No retrobulbar  postseptal pathology  Preseptal soft tissues appear symmetric  Workstation performed: [id number]                    Procedures  Procedures         ED Course       US AUDIT      Most Recent Value   Initial Alcohol Screen: US AUDIT-C    1  How often do you have a drink containing alcohol?  0 Filed at: 08/05/2020 0933   3b  FEMALE Any Age, or MALE 65+: How often do you have 4 or more drinks on one occassion? 0 Filed at: 08/05/2020 0933   Audit-C Score  0 Filed at: 08/05/2020 3382                  PAVEL/DAST-10      Most Recent Value   How many times in the past year have you    Used an illegal drug or used a prescription medication for non-medical reasons? Never Filed at: 08/05/2020 7989                                MDM  Number of Diagnoses or Management Options  Diagnosis management comments: Intra-ocular pressure on the right read between 26 and 29  I do not feel this is acute angle closure glaucoma    And those cases, the pressure is usually much higher  There is no fluorescein uptake to suggest a corneal abrasion  Patient clearly has her pain to the lower lid and to the cheek just below the upper lid  It is exquisitely tender  CT of the orbit was negative for collections  On re-examination it does appear that the lower lid is red and swollen  This could be a sty  However, patient's pain seems out of proportion to that  This might be a facial cellulitis  Will cover with both topical and oral antibiotics  Will have patient follow-up with her ophthalmologist in 1-2 days  Amount and/or Complexity of Data Reviewed  Clinical lab tests: ordered and reviewed  Tests in the radiology section of CPT®: ordered and reviewed          Disposition  Final diagnoses:   Facial cellulitis     Time reflects when diagnosis was documented in both MDM as applicable and the Disposition within this note     Time User Action Codes Description Comment    8/5/2020  1:13 PM Vale Grier [C95 098] Facial cellulitis       ED Disposition     ED Disposition Condition Date/Time Comment    Discharge Stable Wed Aug 5, 2020  1:13 PM Souleymane Thomas discharge to home/self care              Follow-up Information     Follow up With Specialties Details Why Contact Info    Follow-up with your ophthalmologist tomorrow for re-evaluation              Discharge Medication List as of 8/5/2020  1:16 PM      START taking these medications    Details   cephalexin (KEFLEX) 500 mg capsule Take 1 capsule (500 mg total) by mouth every 6 (six) hours for 10 days, Starting Wed 8/5/2020, Until Sat 8/15/2020, Normal      erythromycin (ILOTYCIN) ophthalmic ointment Place a 1/2 inch ribbon of ointment into the lower eyelid OD 6x/day x 1 week, Normal         CONTINUE these medications which have CHANGED    Details   ibuprofen (MOTRIN) 800 mg tablet Take 1 tablet (800 mg total) by mouth every 6 (six) hours as needed for moderate pain, Starting Wed 8/5/2020, Normal         CONTINUE these medications which have NOT CHANGED    Details   ADMELOG SOLOSTAR 100 units/mL injection pen INJECT 20 UNIT 3 TIMES A DAY BY SUBCUTANEOUS ROUTE, Normal      ALPRAZolam (XANAX) 0 5 mg tablet "ONE-HALF" TABLET TWO TIMES A DAY, Normal      amLODIPine (NORVASC) 10 mg tablet Take 10 mg by mouth daily, Historical Med      BASAGLAR KWIKPEN 100 units/mL injection pen Inject under the skin daily at bedtime , Starting Tue 2/4/2020, Historical Med      [facility] IRON 240 (27 Fe) MG tablet TAKE 1 TABLET (240 MG TOTAL) BY MOUTH 3 (THREE) TIMES A DAY WITH MEALS, Normal      cyclobenzaprine (FLEXERIL) 10 mg tablet Take 1 tablet (10 mg total) by mouth 3 (three) times a day as needed for muscle spasms for up to 10 days, Starting Tue 5/5/2020, Until Thu 7/9/2020, Normal      diclofenac (VOLTAREN) 75 mg EC tablet Take 1 tablet (75 mg total) by mouth 2 (two) times a day, Starting Mon 6/15/2020, Normal      doxazosin (CARDURA) 4 mg tablet Take 4 mg by mouth daily, Historical Med      ergocalciferol (VITAMIN D2) 50,000 units Take 1 capsule (50,000 Units total) by mouth 2 (two) times a week, Starting Thu 5/28/2020, Normal      hydrochlorothiazide (HYDRODIURIL) 25 mg tablet TAKE 1 TABLET EVERY DAY BY ORAL ROUTE, Normal      !! Insulin Pen Needle (ULTICARE MICRO PEN NEEDLES) 32G X 4 MM MISC 1 Device by Does not apply route 4 (four) times a day, Starting Tue 6/6/2017, Historical Med      !!  Insulin Pen Needle (UNIFINE PENTIPS PLUS) 31G X 6 MM MISC 1 Device by Does not apply route 4 (four) times a day, Starting Mon 6/5/2017, Historical Med      Lancets MISC 1 Device by Does not apply route 4 (four) times a day, Historical Med      lisinopril (ZESTRIL) 40 mg tablet TAKE 1 TABLET TWICE A DAY BY ORAL ROUTE, Normal      Magnesium 400 MG TABS Take by mouth, Historical Med      meclizine (ANTIVERT) 25 mg tablet Take 1 tablet (25 mg total) by mouth every 8 (eight) hours as needed for dizziness, Starting Sun 10/7/2018, Print      metoprolol succinate (TOPROL-XL) 200 MG 24 hr tablet Take 1 tablet (200 mg total) by mouth daily, Starting Mon 7/6/2020, Normal      oxyCODONE (ROXICODONE) 10 MG TABS Take 1 tablet (10 mg total) by mouth 3 (three) times a day as needed for moderate painMax Daily Amount: 30 mg, Starting Tue 5/26/2020, Normal      pantoprazole (PROTONIX) 20 mg tablet Take 2 tablets (40 mg total) by mouth daily, Starting Tue 7/2/2019, Print      pioglitazone (ACTOS) 45 mg tablet pioglitazone 45 mg tablet, Historical Med      potassium chloride (K-DUR,KLOR-CON) 20 mEq tablet Take 1 tablet (20 mEq total) by mouth 2 (two) times a day for 10 days, Starting Wed 5/27/2020, Until Thu 7/9/2020, Normal      pregabalin (LYRICA) 75 mg capsule TAKE ONE CAPSULE EVERY DAY, Normal      ranibizumab (LUCENTIS) 0 3 mg/0 05mL Lucentis 0 3 mg/0 05 mL intravitreal solution for injection, Historical Med      simvastatin (ZOCOR) 10 mg tablet TAKE 1 TABLET EVERY DAY BY ORAL ROUTE, Normal      spironolactone (ALDACTONE) 25 mg tablet spironolactone 25 mg tablet, Historical Med      TRELEGY ELLIPTA 100-62 5-25 MCG/INH inhaler ONE PUFF EVERY DAY, Normal      TRULICITY 1 5 TV/2 6FG SOPN INJECT 0 5 ML EVERY WEEK BY SUBCUTANEOUS ROUTE , Normal      zolpidem (AMBIEN) 10 mg tablet TAKE 1 TABLET (10 MG TOTAL) BY MOUTH DAILY AT BEDTIME AS NEEDED FOR SLEEP, Starting Mon 6/29/2020, Normal       !! - Potential duplicate medications found  Please discuss with provider  No discharge procedures on file      PDMP Review       Value Time User    PDMP Reviewed  Yes 6/15/2020  9:34 AM Collin Arriaga MD          ED Provider  Electronically Signed by           Elsa Harp MD  08/05/20 9994

## 2020-08-05 NOTE — ED NOTES
Rechecked patient's BP, still elevated  Patient states has not taken any BP meds today as they make her sick if she does not take with meals  Will continue to monitor       Mookie Leonard RN  08/05/20 2286

## 2020-08-06 ENCOUNTER — OFFICE VISIT (OUTPATIENT)
Dept: ENDOCRINOLOGY | Facility: CLINIC | Age: 55
End: 2020-08-06
Payer: COMMERCIAL

## 2020-08-06 VITALS
BODY MASS INDEX: 31.09 KG/M2 | SYSTOLIC BLOOD PRESSURE: 142 MMHG | HEIGHT: 70 IN | HEART RATE: 76 BPM | WEIGHT: 217.2 LBS | DIASTOLIC BLOOD PRESSURE: 80 MMHG

## 2020-08-06 DIAGNOSIS — E78.5 HYPERLIPIDEMIA ASSOCIATED WITH TYPE 2 DIABETES MELLITUS (HCC): ICD-10-CM

## 2020-08-06 DIAGNOSIS — IMO0002 UNCONTROLLED TYPE 2 DIABETES MELLITUS WITH COMPLICATION, WITH LONG-TERM CURRENT USE OF INSULIN: Primary | ICD-10-CM

## 2020-08-06 DIAGNOSIS — E11.42 DIABETIC PERIPHERAL NEUROPATHY (HCC): ICD-10-CM

## 2020-08-06 DIAGNOSIS — E11.69 HYPERLIPIDEMIA ASSOCIATED WITH TYPE 2 DIABETES MELLITUS (HCC): ICD-10-CM

## 2020-08-06 DIAGNOSIS — I10 UNCONTROLLED HYPERTENSION: ICD-10-CM

## 2020-08-06 LAB
METANEPH FREE SERPL-MCNC: <10 PG/ML (ref 0–88)
NORMETANEPHRINE SERPL-MCNC: 53.3 PG/ML (ref 0–136.8)

## 2020-08-06 PROCEDURE — 3060F POS MICROALBUMINURIA REV: CPT | Performed by: INTERNAL MEDICINE

## 2020-08-06 PROCEDURE — 2026F EYE IMG VALID EVC RTNOPTHY: CPT | Performed by: INTERNAL MEDICINE

## 2020-08-06 PROCEDURE — 99214 OFFICE O/P EST MOD 30 MIN: CPT | Performed by: INTERNAL MEDICINE

## 2020-08-06 PROCEDURE — 3046F HEMOGLOBIN A1C LEVEL >9.0%: CPT | Performed by: INTERNAL MEDICINE

## 2020-08-06 PROCEDURE — 3008F BODY MASS INDEX DOCD: CPT | Performed by: INTERNAL MEDICINE

## 2020-08-06 NOTE — PROGRESS NOTES
Established Patient Progress Note       History of Present Illness: This is a 48y o -year-old female with a history of type 2 diabetes with long-term use of insulin since 10 years ago  She reports complications of retinopathy, nephropathy (microalbuminuria)  and neuropathy  She denies any history of stroke or MI  Denies any hospitalizations related to diabetes  Denies recent illness or hospitalizations  She only measures her blood sugar when she feels funny  Last time when she felt funny her blood sugar was 140  Patient denies hypoglycemia  She denies any symptoms of hypoglycemia such as dizziness, lightheadedness, palpitations, sweating  Denies any polyuria, polydipsia, chest pain, shortness of breath, numbness or tingling in her hands or feet      Regarding uncontrolled hypertension -  Patient is currently taking 6 different antihypertensive medications including diuretics  Her blood pressure today in the office 142/80  She had vascular study of renal arteries to rule out renal artery stenosis  Study was negative  She also had lab work in the past including renin, aldosterone  Renin -<0 167, aldosterone 2 6  There is a concern for compliance with her blood pressure medications    Patient had recently done a renin and aldosterone level - pending       Home blood glucose readings-  Not performed      Current regimen:  Basaglar 80 units in the evening, Trulicity 1 5 mg weekly, Apidra 10 units before meals - rarely on average 1-2 x week, Actos 45 mg daily     Diet: low appetite, lunch is main meal, breakfast - smallest meal, dinner has steak or hamburger     Ophthalmology- 1/2020, follows up regularly  Podiatry-  follows up regularly     Hypertension-  Lisinopril 40 mg daily, Lopressor 100 mg daily, hydrochlorothiazide 25 mg daily, amlodipine 10 mg daily, doxazosin 4 mg daily, spironolactone 25 mg daily  Hyperlipidemia- simvastatin 10 mg daily  Thyroid disorders - no        Patient Active Problem List Diagnosis    Anxiety    Cardiac murmur    Carpal tunnel syndrome of left wrist    Change in bowel habit    Chronic pain disorder    Cough    Depression    Retinal hemorrhage due to secondary diabetes ([address] )    Diabetic peripheral neuropathy (HCC)    Dizziness    Uncontrolled hypertension    Fibromyalgia    Gastroesophageal reflux disease with esophagitis    Hemangioma of bone    Hyperlipidemia associated with type 2 diabetes mellitus (HCC)    Hypertension    Hypoestrogenism    Low back pain    Lumbar radiculopathy    Medically complex patient    Neuropathy    Noncompliance    Noncompliance with diet and medication regimen    Overweight    Pancreatitis    Primary insomnia    Right-sided thoracic back pain    Sciatica of left side    Screening for colon cancer    Shingles    Thoracic radiculitis    Tobacco abuse    Uncontrolled type 2 diabetes mellitus with complication, with long-term current use of insulin (HCC)    Vertebral body hemangioma    Vertigo    Vaginal discharge    Yeast vaginitis    Recurrent vaginitis    Abnormal urinalysis    Thoracic aortic aneurysm without rupture (HCC)    Epigastric pain    Urinary tract infection with hematuria    Bacterial vaginosis      Past Medical History:   Diagnosis Date    Anxiety     Aortic aneurysm (HCC)     Arthritis     Depression     Diabetes mellitus (HCC)     Fibromyalgia     GERD (gastroesophageal reflux disease)     H/O cardiovascular stress test 2018    no ischemia  EF 70%   H/O echocardiogram 2019    EF 60%  Mild LVH  trivial effusion      Hyperlipidemia     Hypertension     Migraines     Psychiatric disorder     anxiety      Past Surgical History:   Procedure Laterality Date    BACK SURGERY      Lumbar epidural steroid injection    CARPAL TUNNEL RELEASE Left      SECTION      CHOLECYSTECTOMY      COLONOSCOPY      incomplete    HYSTERECTOMY      Total    OVARIAN CYST REMOVAL      TUBAL LIGATION        Family History   Problem Relation Age of Onset    Hypertension Mother    Thais Barakat Arthritis Mother     Diabetes Father     Other Sister         renal cell carcinoma    Diabetes Other      Social History     Tobacco Use    Smoking status: Current Every Day Smoker     Packs/day: 1 00     Years: 30 00     Pack years: 30 00     Types: Cigarettes    Smokeless tobacco: Never Used   Substance Use Topics    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        No Known Allergies      Current Outpatient Medications:     ADMELOG SOLOSTAR 100 units/mL injection pen, INJECT 20 UNIT 3 TIMES A DAY BY SUBCUTANEOUS ROUTE (Patient taking differently: No sig reported), Disp: 15 mL, Rfl: 5    ALPRAZolam (XANAX) 0 5 mg tablet, "ONE-HALF" TABLET TWO TIMES A DAY, Disp: 30 tablet, Rfl: 1    amLODIPine (NORVASC) 10 mg tablet, Take 10 mg by mouth daily, Disp: , Rfl:     BASAGLAR KWIKPEN 100 units/mL injection pen, Inject under the skin daily at bedtime , Disp: , Rfl:     cephalexin (KEFLEX) 500 mg capsule, Take 1 capsule (500 mg total) by mouth every 6 (six) hours for 10 days, Disp: 40 capsule, Rfl: 0    [facility] IRON 240 (27 Fe) MG tablet, TAKE 1 TABLET (240 MG TOTAL) BY MOUTH 3 (THREE) TIMES A DAY WITH MEALS, Disp: 90 tablet, Rfl: 1    cyclobenzaprine (FLEXERIL) 10 mg tablet, Take 1 tablet (10 mg total) by mouth 3 (three) times a day as needed for muscle spasms for up to 10 days, Disp: 30 tablet, Rfl: 0    diclofenac (VOLTAREN) 75 mg EC tablet, Take 1 tablet (75 mg total) by mouth 2 (two) times a day, Disp: 60 tablet, Rfl: 0    doxazosin (CARDURA) 4 mg tablet, Take 4 mg by mouth daily, Disp: , Rfl:     ergocalciferol (VITAMIN D2) 50,000 units, Take 1 capsule (50,000 Units total) by mouth 2 (two) times a week, Disp: 24 capsule, Rfl: 1    erythromycin (ILOTYCIN) ophthalmic ointment, Place a 1/2 inch ribbon of ointment into the lower eyelid OD 6x/day x 1 week, Disp: 3 5 g, Rfl: 0    hydrochlorothiazide (HYDRODIURIL) 25 mg tablet, TAKE 1 TABLET EVERY DAY BY ORAL ROUTE, Disp: 90 tablet, Rfl: 1    ibuprofen (MOTRIN) 800 mg tablet, Take 1 tablet (800 mg total) by mouth every 6 (six) hours as needed for moderate pain, Disp: 30 tablet, Rfl: 0    Insulin Pen Needle (UNIFINE PENTIPS PLUS) 31G X 6 MM MISC, 1 Device by Does not apply route 4 (four) times a day, Disp: , Rfl:     lisinopril (ZESTRIL) 40 mg tablet, TAKE 1 TABLET TWICE A DAY BY ORAL ROUTE, Disp: 180 tablet, Rfl: 1    Magnesium 400 MG TABS, Take by mouth, Disp: , Rfl:     meclizine (ANTIVERT) 25 mg tablet, Take 1 tablet (25 mg total) by mouth every 8 (eight) hours as needed for dizziness, Disp: 30 tablet, Rfl: 0    metoprolol succinate (TOPROL-XL) 200 MG 24 hr tablet, Take 1 tablet (200 mg total) by mouth daily, Disp: 90 tablet, Rfl: 3    oxyCODONE (ROXICODONE) 10 MG TABS, Take 1 tablet (10 mg total) by mouth 3 (three) times a day as needed for moderate painMax Daily Amount: 30 mg, Disp: 90 tablet, Rfl: 0    pantoprazole (PROTONIX) 20 mg tablet, Take 2 tablets (40 mg total) by mouth daily, Disp: 30 tablet, Rfl: 3    pioglitazone (ACTOS) 45 mg tablet, Take 45 mg by mouth daily , Disp: , Rfl:     pregabalin (LYRICA) 75 mg capsule, TAKE ONE CAPSULE EVERY DAY, Disp: 180 capsule, Rfl: 1    simvastatin (ZOCOR) 10 mg tablet, TAKE 1 TABLET EVERY DAY BY ORAL ROUTE, Disp: 90 tablet, Rfl: 1    TRELEGY ELLIPTA 100-62 5-25 MCG/INH inhaler, ONE PUFF EVERY DAY, Disp: 60 each, Rfl: 1    TRULICITY 1 5 TQ/7 3VG SOPN, INJECT 0 5 ML EVERY WEEK BY SUBCUTANEOUS ROUTE , Disp: 2 mL, Rfl: 1    zolpidem (AMBIEN) 10 mg tablet, TAKE 1 TABLET (10 MG TOTAL) BY MOUTH DAILY AT BEDTIME AS NEEDED FOR SLEEP, Disp: 30 tablet, Rfl: 0    Lancets MISC, 1 Device by Does not apply route 4 (four) times a day, Disp: , Rfl:     spironolactone (ALDACTONE) 25 mg tablet, Take 25 mg by mouth daily , Disp: , Rfl:   No current facility-administered medications for this visit       Review of Systems   Constitutional: Negative for chills, fatigue and fever  HENT: Negative for congestion, postnasal drip and sore throat  Eyes: Negative for pain  Respiratory: Negative for cough and shortness of breath  Cardiovascular: Negative for chest pain, palpitations and leg swelling  Gastrointestinal: Negative for abdominal pain, blood in stool, constipation, diarrhea and nausea  Endocrine: Negative for polydipsia and polyuria  Genitourinary: Negative for dysuria  Musculoskeletal: Negative for arthralgias and back pain  Neurological: Negative for dizziness, light-headedness and headaches  Psychiatric/Behavioral: Negative for behavioral problems  The patient is not nervous/anxious  All other systems reviewed and are negative  Physical Exam:  Body mass index is 31 16 kg/m²  /80 (BP Location: Left arm, Patient Position: Sitting, Cuff Size: Standard)   Pulse 76   Ht 5' 10" (1 778 m)   Wt 98 5 kg (217 lb 3 2 oz)   BMI 31 16 kg/m²    Wt Readings from Last 3 Encounters:   08/06/20 98 5 kg (217 lb 3 2 oz)   08/05/20 88 9 kg (196 lb)   07/09/20 95 6 kg (210 lb 12 8 oz)       Physical Exam   Constitutional: She appears well-developed  HENT:   Head: Normocephalic and atraumatic  Eyes: Pupils are equal, round, and reactive to light  Conjunctivae are normal  Right eye exhibits no discharge  Left eye exhibits no discharge  No scleral icterus  Neck: Neck supple  No thyromegaly present  Cardiovascular: Normal rate and regular rhythm  No murmur heard  Pulmonary/Chest: Effort normal and breath sounds normal  No respiratory distress  Abdominal: Soft  She exhibits no distension  There is no abdominal tenderness  Neurological: She is alert  Skin: Skin is warm and dry       Diabetic Foot Exam    Labs:   Lab Results   Component Value Date    HGBA1C 12 4 (H) 05/26/2020    HGBA1C 11 7 01/20/2020    HGBA1C 9 4 10/08/2019     Lab Results   Component Value Date    CREATININE 0 58 08/05/2020    CREATININE 0 73 07/26/2020    CREATININE 0 79 06/02/2020    BUN 15 08/05/2020     07/19/2015    K 3 8 08/05/2020     08/05/2020    CO2 28 08/05/2020     eGFR   Date Value Ref Range Status   08/05/2020 121 ml/min/1 73sq m Final     Lab Results   Component Value Date    HDL 44 05/26/2020    TRIG 157 (H) 05/26/2020     Lab Results   Component Value Date    ALT 24 07/26/2020    AST 17 07/26/2020    ALKPHOS 88 8 07/26/2020    BILITOT 0 43 07/19/2015     Lab Results   Component Value Date    DQR0OZZKPSXU 1 578 06/02/2020    SDF8SIXYLZEK 1 390 05/26/2020     No results found for: FREET4, TSI    Impression & Plan:    Diagnoses and all orders for this visit:    Uncontrolled type 2 diabetes mellitus with complication, with long-term current use of insulin ([facility] [address] ) -  Most recent A1c in 5/2020- 12 4  Patient will call her insurance to find out if Farmer - CGM office trial is covered  She will also provide us with 1 week of log of her blood sugars  I will make adjustment to her insulin regimen based on her blood sugars  For now she will continue Basaglar 80 units in the evening, Actos 45 mg daily, Trulicity 1 5 mg weekly  She was encouraged to take consistently 10 units of admelog before her meals     -     HEMOGLOBIN A1C W/ EAG ESTIMATION Lab Collect; Future  -     Comprehensive metabolic panel Lab Collect; Future  -     Lipid Panel with Direct LDL reflex Lab Collect; Future  -     Basic metabolic panel Lab Collect; Future  -     HEMOGLOBIN A1C W/ EAG ESTIMATION Lab Collect; Future    Uncontrolled hypertension-  Workup for primary hyperaldosteronism and pheochromocytoma  Plasma metanephrines - negative  Renin, aldosterone-  pending  Blood pressure today in the office 142/80  Patient says she took her medications this morning  Patient was not taking spironolactone for the past 4 weeks prior to her lab testing of renin and aldosterone  She was instructed to restart spironolactone now    -  She is refusing 24 hour urine cortisol or 1 mg dexamethasone suppression test to r/o  Cushing syndrome  I will readdress this with her at next visit  -  continue current  antihypertensive management,  managed per primary care physician    Hyperlipidemia associated with type 2 diabetes mellitus ([facility] )-  continue simvastatin    Diabetic peripheral neuropathy ([facility] )-   continue lyrica      There are no Patient Instructions on file for this visit  Discussed with the patient and all questioned fully answered  She will call me if any problems arise  Follow-up appointment in 4 months       Counseled patient on diagnostic results, prognosis, risk and benefit of treatment options, instruction for management, importance of treatment compliance, Risk  factor reduction and impressions    Analia Chavez MD

## 2020-08-07 LAB — ALDOST SERPL-MCNC: 15.7 NG/DL (ref 0–30)

## 2020-08-11 ENCOUNTER — CLINICAL SUPPORT (OUTPATIENT)
Dept: ENDOCRINOLOGY | Facility: CLINIC | Age: 55
End: 2020-08-11
Payer: COMMERCIAL

## 2020-08-11 DIAGNOSIS — E11.42 DIABETIC PERIPHERAL NEUROPATHY (HCC): ICD-10-CM

## 2020-08-11 DIAGNOSIS — IMO0002 UNCONTROLLED TYPE 2 DIABETES MELLITUS WITH COMPLICATION, WITH LONG-TERM CURRENT USE OF INSULIN: ICD-10-CM

## 2020-08-11 DIAGNOSIS — I10 UNCONTROLLED HYPERTENSION: ICD-10-CM

## 2020-08-11 DIAGNOSIS — I71.9 AORTIC ANEURYSM WITHOUT RUPTURE, UNSPECIFIED PORTION OF AORTA (HCC): ICD-10-CM

## 2020-08-11 PROCEDURE — 95250 CONT GLUC MNTR PHYS/QHP EQP: CPT | Performed by: INTERNAL MEDICINE

## 2020-08-11 RX ORDER — ZOLPIDEM TARTRATE 10 MG/1
10 TABLET ORAL
Qty: 30 TABLET | Refills: 0 | Status: SHIPPED | OUTPATIENT
Start: 2020-08-11 | End: 2020-10-15

## 2020-08-11 RX ORDER — PIOGLITAZONEHYDROCHLORIDE 45 MG/1
TABLET ORAL
Qty: 90 TABLET | Refills: 3 | Status: SHIPPED | OUTPATIENT
Start: 2020-08-11 | End: 2020-12-03 | Stop reason: ALTCHOICE

## 2020-08-11 RX ORDER — FENOFIBRATE 145 MG/1
TABLET, COATED ORAL
Qty: 30 TABLET | Refills: 6 | Status: SHIPPED | OUTPATIENT
Start: 2020-08-11 | End: 2020-12-23

## 2020-08-11 RX ORDER — HYDRALAZINE HYDROCHLORIDE 50 MG/1
TABLET, FILM COATED ORAL
Qty: 120 TABLET | Refills: 3 | Status: SHIPPED | OUTPATIENT
Start: 2020-08-11 | End: 2020-12-23

## 2020-08-11 RX ORDER — CLONIDINE 0.3 MG/24H
PATCH, EXTENDED RELEASE TRANSDERMAL
Qty: 4 PATCH | Refills: 3 | Status: SHIPPED | OUTPATIENT
Start: 2020-08-11 | End: 2020-12-23

## 2020-08-11 RX ORDER — METOPROLOL SUCCINATE 200 MG/1
TABLET, EXTENDED RELEASE ORAL
Qty: 30 TABLET | Refills: 3 | Status: SHIPPED | OUTPATIENT
Start: 2020-08-11 | End: 2020-12-23

## 2020-08-11 NOTE — PROGRESS NOTES
Continous glucose monitoring scar placement     Date/Time 8/11/2020 11:16 AM     Performed by  Blayne Walters     Authorized by Kathy Amrstrong MD      [place] Protocol Consent: Verbal consent obtained  Written consent obtained  Consent given by: patient  Patient understanding: patient states understanding of the procedure being performed  Patient consent: the patient's understanding of the procedure matches consent given  Procedure consent: procedure consent matches procedure scheduled  Relevant documents: relevant documents present and verified  Test results: test results available and properly labeled  Site marked: the operative site was marked  Patient identity confirmed: verbally with patient        Local anesthesia used: no     Anesthesia   Local anesthesia used: no     Sedation   Patient sedated: no        Specimen: no    Culture: no   Procedure Details   Procedure Notes: Patient is here for Farmer sensor placement, placed in left arm    Patient tolerance: Patient tolerated the procedure well with no immediate complications

## 2020-08-18 ENCOUNTER — OFFICE VISIT (OUTPATIENT)
Dept: OBGYN CLINIC | Facility: CLINIC | Age: 55
End: 2020-08-18
Payer: COMMERCIAL

## 2020-08-18 VITALS
WEIGHT: 210 LBS | DIASTOLIC BLOOD PRESSURE: 90 MMHG | HEART RATE: 90 BPM | BODY MASS INDEX: 30.06 KG/M2 | SYSTOLIC BLOOD PRESSURE: 164 MMHG | HEIGHT: 70 IN

## 2020-08-18 DIAGNOSIS — M75.02 ADHESIVE CAPSULITIS OF LEFT SHOULDER: Primary | ICD-10-CM

## 2020-08-18 PROCEDURE — 3060F POS MICROALBUMINURIA REV: CPT | Performed by: ORTHOPAEDIC SURGERY

## 2020-08-18 PROCEDURE — 3080F DIAST BP >= 90 MM HG: CPT | Performed by: ORTHOPAEDIC SURGERY

## 2020-08-18 PROCEDURE — 2026F EYE IMG VALID EVC RTNOPTHY: CPT | Performed by: ORTHOPAEDIC SURGERY

## 2020-08-18 PROCEDURE — 20610 DRAIN/INJ JOINT/BURSA W/O US: CPT | Performed by: ORTHOPAEDIC SURGERY

## 2020-08-18 PROCEDURE — 3046F HEMOGLOBIN A1C LEVEL >9.0%: CPT | Performed by: ORTHOPAEDIC SURGERY

## 2020-08-18 PROCEDURE — 3077F SYST BP >= 140 MM HG: CPT | Performed by: ORTHOPAEDIC SURGERY

## 2020-08-18 PROCEDURE — 99203 OFFICE O/P NEW LOW 30 MIN: CPT | Performed by: ORTHOPAEDIC SURGERY

## 2020-08-18 PROCEDURE — 3008F BODY MASS INDEX DOCD: CPT | Performed by: ORTHOPAEDIC SURGERY

## 2020-08-18 RX ORDER — LIDOCAINE HYDROCHLORIDE 10 MG/ML
4 INJECTION, SOLUTION INFILTRATION; PERINEURAL
Status: COMPLETED | OUTPATIENT
Start: 2020-08-18 | End: 2020-08-18

## 2020-08-18 RX ORDER — METHYLPREDNISOLONE ACETATE 40 MG/ML
1 INJECTION, SUSPENSION INTRA-ARTICULAR; INTRALESIONAL; INTRAMUSCULAR; SOFT TISSUE
Status: COMPLETED | OUTPATIENT
Start: 2020-08-18 | End: 2020-08-18

## 2020-08-18 RX ADMIN — LIDOCAINE HYDROCHLORIDE 4 ML: 10 INJECTION, SOLUTION INFILTRATION; PERINEURAL at 09:23

## 2020-08-18 RX ADMIN — METHYLPREDNISOLONE ACETATE 1 ML: 40 INJECTION, SUSPENSION INTRA-ARTICULAR; INTRALESIONAL; INTRAMUSCULAR; SOFT TISSUE at 09:23

## 2020-08-18 NOTE — PROGRESS NOTES
Patient Name:  Rebecca Junior  MRN:  [id number]    Assessment & Plan     Left shoulder adhesive capsulitis  1  Provided patient with a cortisone injection today in the office  2  Offered referral to PT but patient declined as it has not helped in the past    3  Follow up in 1 month  Call sooner to arrange MRI if no relief after injection  Chief Complaint     Left Shoulder Pain    History of the Present Illness     Rebecca Junior is a 48 y o  female presents today for an initial visit for her left shoulder  She states that she has had a recent manipulation of her left shoulder with Dr Wang Herzog greater than 6 months ago, she can not remember a specific date  Her pain is localized diffusely about the shoulder  She states that her ROM continues to remain limited  She states that her pain is intermittent in timing but occurring with increased frequency over the past 2-3 weeks  No injury was noted  Has tried formal PT/HEP without benefit  Denies numbness and tingling, fevers or chills  Physical Exam     /90   Pulse 90   Ht 5' 10" (1 778 m)   Wt 95 3 kg (210 lb)   BMI 30 13 kg/m²     Left  shoulder:  Tenderness to palpation:  Diffuse  Range of motion:  FF:  130, 180 R  ER-abduction: 80,100 R  IR-abduction: 10, 30 R  Strength:  FF 4/5  Impingement signs:  Positive  Empty can test:  Positive  Speed's test:  Positive  Cross-body adduction test:  Positive    Eyes:  Anicteric sclerae  Neck:  Supple  Lungs:  Normal respiratory effort  Cardiovascular:  Capillary refill is less than 2 seconds  Skin:  Intact without erythema  Neurologic:  Sensation intact to light touch  Psychiatric:  Mood and affect are appropriate  Past Medical History:   Diagnosis Date    Anxiety     Aortic aneurysm ([address] )     Arthritis     Depression     Diabetes mellitus (HCC)     Fibromyalgia     GERD (gastroesophageal reflux disease)     H/O cardiovascular stress test 09/2018    no ischemia  EF 70%      H/O echocardiogram 2019    EF 60%  Mild LVH  trivial effusion      Hyperlipidemia     Hypertension     Migraines     Psychiatric disorder     anxiety       Past Surgical History:   Procedure Laterality Date    BACK SURGERY      Lumbar epidural steroid injection    CARPAL TUNNEL RELEASE Left      SECTION      CHOLECYSTECTOMY      COLONOSCOPY      incomplete    HYSTERECTOMY      Total    OVARIAN CYST REMOVAL      TUBAL LIGATION         No Known Allergies    Current Outpatient Medications on File Prior to Visit   Medication Sig Dispense Refill    ADMELOG SOLOSTAR 100 units/mL injection pen INJECT 20 UNIT 3 TIMES A DAY BY SUBCUTANEOUS ROUTE (Patient taking differently: No sig reported) 15 mL 5    ALPRAZolam (XANAX) 0 5 mg tablet "ONE-HALF" TABLET TWO TIMES A DAY 30 tablet 1    amLODIPine (NORVASC) 10 mg tablet Take 10 mg by mouth daily      BASAGLAR KWIKPEN 100 units/mL injection pen Inject under the skin daily at bedtime       cloNIDine (CATAPRES-TTS-3) 0 3 mg/24 hr APPLY 1 PATCH EVERY WEEK BY TRANSDERMAL 4 patch 3    [facility] IRON 240 (27 Fe) MG tablet TAKE 1 TABLET (240 MG TOTAL) BY MOUTH 3 (THREE) TIMES A DAY WITH MEALS 90 tablet 1    cyclobenzaprine (FLEXERIL) 10 mg tablet Take 1 tablet (10 mg total) by mouth 3 (three) times a day as needed for muscle spasms for up to 10 days 30 tablet 0    diclofenac (VOLTAREN) 75 mg EC tablet Take 1 tablet (75 mg total) by mouth 2 (two) times a day 60 tablet 0    doxazosin (CARDURA) 4 mg tablet Take 4 mg by mouth daily      ergocalciferol (VITAMIN D2) 50,000 units Take 1 capsule (50,000 Units total) by mouth 2 (two) times a week 24 capsule 1    erythromycin (ILOTYCIN) ophthalmic ointment Place a 1/2 inch ribbon of ointment into the lower eyelid OD 6x/day x 1 week 3 5 g 0    fenofibrate (TRICOR) 145 mg tablet TAKE ONE TABLET EVERY DAY 30 tablet 6    hydrALAZINE (APRESOLINE) 50 mg tablet TAKE TWO TABLETS (100MG) TWO TIMES A  tablet 3    hydrochlorothiazide (HYDRODIURIL) 25 mg tablet TAKE 1 TABLET EVERY DAY BY ORAL ROUTE 90 tablet 1    ibuprofen (MOTRIN) 800 mg tablet Take 1 tablet (800 mg total) by mouth every 6 (six) hours as needed for moderate pain 30 tablet 0    Insulin Pen Needle (UNIFINE PENTIPS PLUS) 31G X 6 MM MISC 1 Device by Does not apply route 4 (four) times a day      Lancets MISC 1 Device by Does not apply route 4 (four) times a day      lisinopril (ZESTRIL) 40 mg tablet TAKE 1 TABLET TWICE A DAY BY ORAL ROUTE 180 tablet 1    Magnesium 400 MG TABS Take by mouth      meclizine (ANTIVERT) 25 mg tablet Take 1 tablet (25 mg total) by mouth every 8 (eight) hours as needed for dizziness 30 tablet 0    metFORMIN (GLUCOPHAGE) 1000 MG tablet TAKE 1 TABLET TWICE A DAY BY ORAL ROUTE 60 tablet 3    metoprolol succinate (TOPROL-XL) 200 MG 24 hr tablet TAKE 1 TABLET EVERY DAY BY ORAL ROUTE 30 tablet 3    oxyCODONE (ROXICODONE) 10 MG TABS Take 1 tablet (10 mg total) by mouth 3 (three) times a day as needed for moderate painMax Daily Amount: 30 mg 90 tablet 0    pantoprazole (PROTONIX) 20 mg tablet Take 2 tablets (40 mg total) by mouth daily 30 tablet 3    pioglitazone (ACTOS) 45 mg tablet TAKE ONE TABLET EVERY DAY 90 tablet 3    pregabalin (LYRICA) 75 mg capsule TAKE ONE CAPSULE EVERY  capsule 1    simvastatin (ZOCOR) 10 mg tablet TAKE 1 TABLET EVERY DAY BY ORAL ROUTE 90 tablet 1    spironolactone (ALDACTONE) 25 mg tablet Take 25 mg by mouth daily       TRELEGY ELLIPTA 100-62 5-25 MCG/INH inhaler ONE PUFF EVERY DAY 60 each 1    TRULICITY 1 5 OS/4 1FH SOPN INJECT 0 5 ML EVERY WEEK BY SUBCUTANEOUS ROUTE  2 mL 1    zolpidem (AMBIEN) 10 mg tablet TAKE 1 TABLET (10 MG TOTAL) BY MOUTH DAILY AT BEDTIME AS NEEDED FOR SLEEP 30 tablet 0     No current facility-administered medications on file prior to visit          Social History     Tobacco Use    Smoking status: Current Every Day Smoker     Packs/day: 1 00     Years: 30 00     Pack years: 30 00     Types: Cigarettes    Smokeless tobacco: Never Used   Substance Use Topics    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   Drug use: Not Currently     Types: Cocaine       Family History   Problem Relation Age of Onset    Hypertension Mother    [employer] Arthritis Mother     Diabetes Father     Other Sister         renal cell carcinoma    Diabetes Other        Review of Systems     As stated in the HPI  All other systems were reviewed and are negative        Large joint arthrocentesis: L glenohumeral  Date/Time: 8/18/2020 9:23 AM  Consent given by: patient  Site marked: site marked  Timeout: Immediately prior to procedure a time out was called to verify the correct patient, procedure, equipment, support staff and site/side marked as required   Supporting Documentation  Indications: pain and diagnostic evaluation   Procedure Details  Location: shoulder - L glenohumeral  Preparation: Patient was prepped and draped in the usual sterile fashion  Needle size: 22 G  Ultrasound guidance: no  Approach: posterior  Medications administered: 1 mL methylPREDNISolone acetate 40 mg/mL; 4 mL lidocaine 1 %    Patient tolerance: patient tolerated the procedure well with no immediate complications  Dressing:  Sterile dressing applied            Scribe Attestation    I,:   Maciel Robert am acting as a scribe while in the presence of the attending physician :        I,:   Arun Muhammad MD personally performed the services described in this documentation    as scribed in my presence :

## 2020-08-25 ENCOUNTER — TELEPHONE (OUTPATIENT)
Dept: ENDOCRINOLOGY | Facility: CLINIC | Age: 55
End: 2020-08-25

## 2020-08-28 ENCOUNTER — TELEPHONE (OUTPATIENT)
Dept: ENDOCRINOLOGY | Facility: CLINIC | Age: 55
End: 2020-08-28

## 2020-08-29 NOTE — TELEPHONE ENCOUNTER
I called pt with results of her [facility] office - CGM  Data were reported only for 5 days b/c sensor fell off  Kelli A [employer]   Device used [employer]- Physician Provided Equipment    Indication   Type 2 Diabetes    More than 72 hours of data was reviewed  Report to be scanned to chart  Date Range: 9/11-9/15    Analysis of data:   Average Glucose: 355  Coefficient of Variation: 20%   Time in Target Range: 0%   Time Above Range: 100%   Time Below Range: 0%    Interpretation of data:  Patient with continuously high blood sugars  She is only on basal insulin  I recommended that she has to start basal/bolus insulin therapy  She was instructed to take 10 units of humalog before her meals, if she does not eat, humalog should not be administered  She voiced understanding, said she ll try her best to start taking humalog 10 units before her meals on regular basis  She also said she does not eat much, has very little overall food intake throughout the day because of low appetite  She was instructed to measure her blood sugars 3x daily and send us a log, but we previously discussed that she is unable to comply due to her work schedule  This was also discussed today  I will see her back in the office in 12/2020  She will have labs done prior to her appointment

## 2020-08-31 ENCOUNTER — TELEPHONE (OUTPATIENT)
Dept: OBGYN CLINIC | Facility: HOSPITAL | Age: 55
End: 2020-08-31

## 2020-08-31 DIAGNOSIS — M24.812 INTERNAL DERANGEMENT OF LEFT SHOULDER: Primary | ICD-10-CM

## 2020-08-31 NOTE — TELEPHONE ENCOUNTER
Patient sees Dr Rebeca Motley  Patient called because she saw you on 08/18 and had a cortisone injection on her L shoulder  She still in pain and she is requesting the MRI order

## 2020-09-01 NOTE — TELEPHONE ENCOUNTER
Patient made aware of L Shoulder MRI order  Advised she should be hearing from the office on Authorization  If she does not hear from office in 48 hrs give a call to check in  Verbalized understanding

## 2020-09-01 NOTE — TELEPHONE ENCOUNTER
S/w pt  She is going to schedule MRI once scheduled I will work on prior [facility]        sp

## 2020-09-03 ENCOUNTER — TELEPHONE (OUTPATIENT)
Dept: OBGYN CLINIC | Facility: HOSPITAL | Age: 55
End: 2020-09-03

## 2020-09-03 DIAGNOSIS — M75.02 ADHESIVE CAPSULITIS OF LEFT SHOULDER: Primary | ICD-10-CM

## 2020-09-03 RX ORDER — MELOXICAM 15 MG/1
15 TABLET ORAL DAILY
Qty: 30 TABLET | Refills: 0 | Status: SHIPPED | OUTPATIENT
Start: 2020-09-03 | End: 2020-11-17 | Stop reason: ALTCHOICE

## 2020-09-03 NOTE — TELEPHONE ENCOUNTER
Patient sees Dr Ramez Menezes    Patient called in asking for pain medication for her shoulder  Pain level is at 10  Patient has tried heating pad, tylenol isnt working  [facility]       cb [phone]

## 2020-09-10 ENCOUNTER — HOSPITAL ENCOUNTER (OUTPATIENT)
Dept: RADIOLOGY | Age: 55
Discharge: HOME | End: 2020-09-10
Payer: COMMERCIAL

## 2020-09-10 DIAGNOSIS — M24.812 INTERNAL DERANGEMENT OF LEFT SHOULDER: ICD-10-CM

## 2020-09-10 PROCEDURE — 73221 MRI JOINT UPR EXTREM W/O DYE: CPT

## 2020-09-10 PROCEDURE — G1004 CDSM NDSC: HCPCS

## 2020-09-18 DIAGNOSIS — IMO0002 UNCONTROLLED TYPE 2 DIABETES MELLITUS WITH COMPLICATION, WITH LONG-TERM CURRENT USE OF INSULIN: ICD-10-CM

## 2020-09-18 PROCEDURE — 4010F ACE/ARB THERAPY RXD/TAKEN: CPT | Performed by: ORTHOPAEDIC SURGERY

## 2020-09-18 RX ORDER — SIMVASTATIN 10 MG
TABLET ORAL
Qty: 90 TABLET | Refills: 1 | Status: SHIPPED | OUTPATIENT
Start: 2020-09-18 | End: 2021-01-14 | Stop reason: ALTCHOICE

## 2020-09-18 RX ORDER — HYDROCHLOROTHIAZIDE 25 MG/1
TABLET ORAL
Qty: 90 TABLET | Refills: 1 | Status: SHIPPED | OUTPATIENT
Start: 2020-09-18 | End: 2021-11-23 | Stop reason: HOSPADM

## 2020-09-18 RX ORDER — LISINOPRIL 40 MG/1
TABLET ORAL
Qty: 180 TABLET | Refills: 1 | Status: SHIPPED | OUTPATIENT
Start: 2020-09-18 | End: 2021-03-19

## 2020-09-20 ENCOUNTER — HOSPITAL ENCOUNTER (EMERGENCY)
Facility: HOSPITAL | Age: 55
Discharge: HOME | End: 2020-09-20
Attending: EMERGENCY MEDICINE | Admitting: EMERGENCY MEDICINE
Payer: COMMERCIAL

## 2020-09-20 VITALS
OXYGEN SATURATION: 98 % | TEMPERATURE: 97.9 F | SYSTOLIC BLOOD PRESSURE: 184 MMHG | HEART RATE: 89 BPM | DIASTOLIC BLOOD PRESSURE: 90 MMHG | RESPIRATION RATE: 17 BRPM

## 2020-09-20 DIAGNOSIS — M75.02 ADHESIVE CAPSULITIS OF LEFT SHOULDER: Primary | ICD-10-CM

## 2020-09-20 PROCEDURE — 99284 EMERGENCY DEPT VISIT MOD MDM: CPT | Performed by: EMERGENCY MEDICINE

## 2020-09-20 PROCEDURE — 99283 EMERGENCY DEPT VISIT LOW MDM: CPT

## 2020-09-20 PROCEDURE — 96372 THER/PROPH/DIAG INJ SC/IM: CPT

## 2020-09-20 RX ORDER — DEXAMETHASONE SODIUM PHOSPHATE 4 MG/ML
10 INJECTION, SOLUTION INTRA-ARTICULAR; INTRALESIONAL; INTRAMUSCULAR; INTRAVENOUS; SOFT TISSUE ONCE
Status: COMPLETED | OUTPATIENT
Start: 2020-09-20 | End: 2020-09-20

## 2020-09-20 RX ORDER — LIDOCAINE 50 MG/G
1 PATCH TOPICAL DAILY
Qty: 30 PATCH | Refills: 0 | Status: SHIPPED | OUTPATIENT
Start: 2020-09-20 | End: 2021-09-07 | Stop reason: ALTCHOICE

## 2020-09-20 RX ORDER — OXYCODONE HYDROCHLORIDE AND ACETAMINOPHEN 5; 325 MG/1; MG/1
1 TABLET ORAL EVERY 4 HOURS PRN
Qty: 18 TABLET | Refills: 0 | Status: SHIPPED | OUTPATIENT
Start: 2020-09-20 | End: 2020-09-30

## 2020-09-20 RX ORDER — KETOROLAC TROMETHAMINE 10 MG/1
10 TABLET, FILM COATED ORAL EVERY 6 HOURS PRN
Qty: 18 TABLET | Refills: 0 | Status: SHIPPED | OUTPATIENT
Start: 2020-09-20 | End: 2020-11-17 | Stop reason: ALTCHOICE

## 2020-09-20 RX ADMIN — DEXAMETHASONE SODIUM PHOSPHATE 10 MG: 4 INJECTION, SOLUTION INTRAMUSCULAR; INTRAVENOUS at 10:02

## 2020-09-20 NOTE — ED PROVIDER NOTES
History  Chief Complaint   Patient presents with    Shoulder Injury     pt presents to ed for left shoulder pain thats been going on for a few, had  manipulation and mri 9/10  no other complaints  denies chest pain and sob     This is a 61-year-old female presenting to the ED complaining of left shoulder pain that has been gradually worsening over the past month  She denies any recent trauma, and she has been following with orthopedic surgery who had an MRI done about 10 days ago  She states she does not know the results, but I can see that the results in our system show that she has a small supraspinatus tear and acromioclavicular osteoarthritis  She states that this point she is unable to really move her left shoulder much at all in flexion, extension or abduction, and her left arm feels best held abducted at her side  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 and has been working through this  She has been trying meloxicam, Tylenol, heat which have not helped much  History provided by:  Patient   used: No    Shoulder Injury   Location:  Shoulder  Shoulder location:  L shoulder  Injury: no    Pain details:     Quality:  Throbbing and aching    Radiates to:  Does not radiate    Severity:  Moderate    Onset quality:  Gradual    Duration:  1 month    Timing:  Constant    Progression:  Worsening  Handedness:  Right-handed  Dislocation: no    Foreign body present:  No foreign bodies  Prior injury to area:  Yes  Relieved by:  Nothing  Worsened by: Movement      Prior to Admission Medications   Prescriptions Last Dose Informant Patient Reported? Taking?    ADMELOG SOLOSTAR 100 units/mL injection pen  Self No No   Sig: INJECT 20 UNIT 3 TIMES A DAY BY SUBCUTANEOUS ROUTE   Patient taking differently: No sig reported   ALPRAZolam (XANAX) 0 5 mg tablet   No Yes   Sig: "ONE-HALF" TABLET TWO TIMES A DAY   BASAGLAR KWIKPEN 100 units/mL injection pen  Self Yes Yes   Sig: Inject under the skin daily at bedtime    [facility] IRON 240 (27 Fe) MG tablet  Self No Yes   Sig: TAKE 1 TABLET (240 MG TOTAL) BY MOUTH 3 (THREE) TIMES A DAY WITH MEALS   Insulin Pen Needle (UNIFINE PENTIPS PLUS) 31G X 6 MM MISC  Self Yes Yes   Si Device by Does not apply route 4 (four) times a day   Lancets MISC  Self Yes Yes   Si Device by Does not apply route 4 (four) times a day   Magnesium 400 MG TABS  Self Yes Yes   Sig: Take by mouth   TRELEGY ELLIPTA 100-62 5-25 MCG/INH inhaler   No Yes   Sig: ONE PUFF EVERY DAY   TRULICITY 1 5 JW/8 0RA SOPN   No Yes   Sig: INJECT 0 5 ML EVERY WEEK BY SUBCUTANEOUS ROUTE    amLODIPine (NORVASC) 10 mg tablet  Self Yes Yes   Sig: Take 10 mg by mouth daily   cloNIDine (CATAPRES-TTS-3) 0 3 mg/24 hr   No Yes   Sig: APPLY 1 PATCH EVERY WEEK BY TRANSDERMAL   cyclobenzaprine (FLEXERIL) 10 mg tablet  Self No Yes   Sig: Take 1 tablet (10 mg total) by mouth 3 (three) times a day as needed for muscle spasms for up to 10 days   diclofenac (VOLTAREN) 75 mg EC tablet  Self No Yes   Sig: Take 1 tablet (75 mg total) by mouth 2 (two) times a day   doxazosin (CARDURA) 4 mg tablet  Self Yes Yes   Sig: Take 4 mg by mouth daily   ergocalciferol (VITAMIN D2) 50,000 units  Self No Yes   Sig: Take 1 capsule (50,000 Units total) by mouth 2 (two) times a week   erythromycin (ILOTYCIN) ophthalmic ointment   No No   Sig: Place a 1/2 inch ribbon of ointment into the lower eyelid OD 6x/day x 1 week   fenofibrate (TRICOR) 145 mg tablet   No Yes   Sig: TAKE ONE TABLET EVERY DAY   hydrALAZINE (APRESOLINE) 50 mg tablet   No Yes   Sig: TAKE TWO TABLETS (100MG) TWO TIMES A DAY   hydrochlorothiazide (HYDRODIURIL) 25 mg tablet   No Yes   Sig: TAKE ONE TABLET EVERY DAY   ibuprofen (MOTRIN) 800 mg tablet   No Yes   Sig: Take 1 tablet (800 mg total) by mouth every 6 (six) hours as needed for moderate pain   lisinopril (ZESTRIL) 40 mg tablet   No Yes   Sig: TAKE ONE TABLET TWO TIMES A DAY   meclizine (ANTIVERT) 25 mg tablet  Self No Yes   Sig: Take 1 tablet (25 mg total) by mouth every 8 (eight) hours as needed for dizziness   meloxicam (MOBIC) 15 mg tablet   No Yes   Sig: Take 1 tablet (15 mg total) by mouth daily   metFORMIN (GLUCOPHAGE) 1000 MG tablet   No Yes   Sig: TAKE 1 TABLET TWICE A DAY BY ORAL ROUTE   metoprolol succinate (TOPROL-XL) 200 MG 24 hr tablet   No Yes   Sig: TAKE 1 TABLET EVERY DAY BY ORAL ROUTE   oxyCODONE (ROXICODONE) 10 MG TABS  Self No Yes   Sig: Take 1 tablet (10 mg total) by mouth 3 (three) times a day as needed for moderate painMax Daily Amount: 30 mg   pantoprazole (PROTONIX) 20 mg tablet  Self No Yes   Sig: Take 2 tablets (40 mg total) by mouth daily   pioglitazone (ACTOS) 45 mg tablet   No Yes   Sig: TAKE ONE TABLET EVERY DAY   pregabalin (LYRICA) 75 mg capsule  Self No Yes   Sig: TAKE ONE CAPSULE EVERY DAY   simvastatin (ZOCOR) 10 mg tablet   No Yes   Sig: TAKE 1 TABLET EVERY DAY BY ORAL ROUTE   spironolactone (ALDACTONE) 25 mg tablet  Self Yes Yes   Sig: Take 25 mg by mouth daily    zolpidem (AMBIEN) 10 mg tablet   No Yes   Sig: TAKE 1 TABLET (10 MG TOTAL) BY MOUTH DAILY AT BEDTIME AS NEEDED FOR SLEEP      Facility-Administered Medications: None       Past Medical History:   Diagnosis Date    Anxiety     Aortic aneurysm (HCC)     Arthritis     Depression     Diabetes mellitus (HCC)     Fibromyalgia     GERD (gastroesophageal reflux disease)     H/O cardiovascular stress test 2018    no ischemia  EF 70%   H/O echocardiogram 2019    EF 60%  Mild LVH  trivial effusion      Hyperlipidemia     Hypertension     Migraines     Psychiatric disorder     anxiety       Past Surgical History:   Procedure Laterality Date    BACK SURGERY      Lumbar epidural steroid injection    CARPAL TUNNEL RELEASE Left      SECTION      CHOLECYSTECTOMY      COLONOSCOPY      incomplete    HYSTERECTOMY      Total    OVARIAN CYST REMOVAL      TUBAL LIGATION         Family History   Problem Relation Age of Onset    Hypertension Mother     Arthritis Mother     Diabetes Father     Other Sister         renal cell carcinoma    Diabetes Other      I have reviewed and agree with the history as documented  E-Cigarette/Vaping    E-Cigarette Use Never User      E-Cigarette/Vaping Substances    Nicotine No     THC No     CBD No     Flavoring No     Other No     Unknown No      Social History     Tobacco Use    Smoking status: Current Every Day Smoker     Packs/day: 1 00     Years: 30 00     Pack years: 30 00     Types: Cigarettes    Smokeless tobacco: Never Used   Substance Use Topics    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   Drug use: Not Currently     Types: Cocaine       Review of Systems   All other systems reviewed and are negative  Physical Exam  Physical Exam  Vitals signs and nursing note reviewed  Constitutional:       Appearance: Normal appearance  She is normal weight  HENT:      Head: Normocephalic and atraumatic  Mouth/Throat:      Mouth: Mucous membranes are moist    Eyes:      Conjunctiva/sclera: Conjunctivae normal    Cardiovascular:      Rate and Rhythm: Normal rate and regular rhythm  Pulses: Normal pulses  Pulmonary:      Effort: Pulmonary effort is normal    Musculoskeletal:      Comments: L shoulder: held at adduction at side, forearm neutral  She has severe trapezius and deltoid spasm/increased tone  Unable to move shoulder much from comfortable position  When moved actively she tenses up in all planes, with very little ROM achieved  No deformity, no erythema or warmth or effusions  Skin:     General: Skin is warm and dry  Neurological:      General: No focal deficit present  Mental Status: She is alert and oriented to person, place, and time  Mental status is at baseline     Psychiatric:         Mood and Affect: Mood normal          Behavior: Behavior normal          Vital Signs  ED Triage Vitals   Temperature Pulse Respirations Blood Pressure SpO2   09/20/20 0913 09/20/20 0913 09/20/20 0913 09/20/20 0913 09/20/20 0913   97 9 °F (36 6 °C) 92 18 (!) 222/104 99 %      Temp Source Heart Rate Source Patient Position - Orthostatic VS BP Location FiO2 (%)   09/20/20 0913 09/20/20 0913 09/20/20 1012 09/20/20 0913 --   Tympanic Monitor Lying Left arm       Pain Score       09/20/20 0913       9           Vitals:    09/20/20 0913 09/20/20 0930 09/20/20 1012   BP: (!) 222/104 (!) 200/96 (!) 184/90   Pulse: 92 89    Patient Position - Orthostatic VS:   Lying         Visual Acuity      ED Medications  Medications   dexamethasone (DECADRON) injection 10 mg (10 mg Intramuscular Given 9/20/20 1002)       Diagnostic Studies  Results Reviewed     None                 No orders to display              Procedures  Procedures         ED Course                           SBIRT 22yo+      Most Recent Value   SBIRT (25 yo +)   In order to provide better care to our patients, we are screening all of our patients for alcohol and drug use  Would it be okay to ask you these screening questions? Yes Filed at: 09/20/2020 5950   Initial Alcohol Screen: US AUDIT-C    1  How often do you have a drink containing alcohol?  0 Filed at: 09/20/2020 0917   2  How many drinks containing alcohol do you have on a typical day you are drinking? 0 Filed at: 09/20/2020 0917   3a  Male UNDER 65: How often do you have five or more drinks on one occasion? 0 Filed at: 09/20/2020 0917   3b  FEMALE Any Age, or MALE 65+: How often do you have 4 or more drinks on one occassion? 0 Filed at: 09/20/2020 0917   Audit-C Score  0 Filed at: 09/20/2020 7658   PAVEL: How many times in the past year have you    Used an illegal drug or used a prescription medication for non-medical reasons?   Never Filed at: 09/20/2020 1988                  MDM  Number of Diagnoses or Management Options  Adhesive capsulitis of left shoulder: established and worsening  Diagnosis management comments: 47 yo F w/ worsening L shoulder pain, developing frozen shoulder/adhesive capsulitis  Given a steroid shot here, prescriptions for home, and advised to follow up with orthopedics this week, as she'll need physical therapy and additional specialist care  Amount and/or Complexity of Data Reviewed  Decide to obtain previous medical records or to obtain history from someone other than the patient: yes    Risk of Complications, Morbidity, and/or Mortality  Presenting problems: low  Diagnostic procedures: low  Management options: low    Patient Progress  Patient progress: stable      Disposition  Final diagnoses:   Adhesive capsulitis of left shoulder     Time reflects when diagnosis was documented in both MDM as applicable and the Disposition within this note     Time User Action Codes Description Comment    9/20/2020  9:59 AM Trini Fuller Add [M75 02] Adhesive capsulitis of left shoulder       ED Disposition     ED Disposition Condition Date/Time Comment    Discharge Stable Sun Sep 20, 2020  9:59 AM Tanya Garland discharge to home/self care              Follow-up Information     Follow up With Specialties Details Why Contact Info    Denny Fields MD Family Medicine In 3 days  [address]  [address]  [phone]      Lisa Murphy MD Orthopedic Surgery Schedule an appointment as soon as possible for a visit in 3 days  [address] [place] [address]51 [phone]            Discharge Medication List as of 9/20/2020 10:04 AM      START taking these medications    Details   ketorolac (TORADOL) 10 mg tablet Take 1 tablet (10 mg total) by mouth every 6 (six) hours as needed for moderate pain, Starting Sun 9/20/2020, Print      lidocaine (LIDODERM) 5 % Apply 1 patch topically daily Remove & Discard patch within 12 hours or as directed by MD, Starting Sun 9/20/2020, Print      oxyCODONE-acetaminophen (PERCOCET) 5-325 mg per tablet Take 1 tablet by mouth every 4 (four) hours as needed for moderate pain for up to 10 daysMax Daily Amount: 6 tablets, Starting Sun 9/20/2020, Until Wed 9/30/2020, Print         CONTINUE these medications which have NOT CHANGED    Details   ALPRAZolam (XANAX) 0 5 mg tablet "ONE-HALF" TABLET TWO TIMES A DAY, Normal      amLODIPine (NORVASC) 10 mg tablet Take 10 mg by mouth daily, Historical Med      BASAGLAR KWIKPEN 100 units/mL injection pen Inject under the skin daily at bedtime , Starting Tue 2/4/2020, Historical Med      cloNIDine (CATAPRES-TTS-3) 0 3 mg/24 hr APPLY 1 PATCH EVERY WEEK BY TRANSDERMAL, Normal      [employer] IRON 240 (27 Fe) MG tablet TAKE 1 TABLET (240 MG TOTAL) BY MOUTH 3 (THREE) TIMES A DAY WITH MEALS, Normal      cyclobenzaprine (FLEXERIL) 10 mg tablet Take 1 tablet (10 mg total) by mouth 3 (three) times a day as needed for muscle spasms for up to 10 days, Starting Tue 5/5/2020, Until Sun 9/20/2020, Normal      diclofenac (VOLTAREN) 75 mg EC tablet Take 1 tablet (75 mg total) by mouth 2 (two) times a day, Starting Mon 6/15/2020, Normal      doxazosin (CARDURA) 4 mg tablet Take 4 mg by mouth daily, Historical Med      ergocalciferol (VITAMIN D2) 50,000 units Take 1 capsule (50,000 Units total) by mouth 2 (two) times a week, Starting Thu 5/28/2020, Normal      fenofibrate (TRICOR) 145 mg tablet TAKE ONE TABLET EVERY DAY, Normal      hydrALAZINE (APRESOLINE) 50 mg tablet TAKE TWO TABLETS (100MG) TWO TIMES A DAY, Normal      hydrochlorothiazide (HYDRODIURIL) 25 mg tablet TAKE ONE TABLET EVERY DAY, Normal      ibuprofen (MOTRIN) 800 mg tablet Take 1 tablet (800 mg total) by mouth every 6 (six) hours as needed for moderate pain, Starting Wed 8/5/2020, Normal      Insulin Pen Needle (UNIFINE PENTIPS PLUS) 31G X 6 MM MISC 1 Device by Does not apply route 4 (four) times a day, Starting Mon 6/5/2017, Historical Med      Lancets MISC 1 Device by Does not apply route 4 (four) times a day, Historical Med      lisinopril (ZESTRIL) 40 mg tablet TAKE ONE TABLET TWO TIMES A DAY, Normal      Magnesium 400 MG TABS Take by mouth, Historical Med      meclizine (ANTIVERT) 25 mg tablet Take 1 tablet (25 mg total) by mouth every 8 (eight) hours as needed for dizziness, Starting Sun 10/7/2018, Print      meloxicam (MOBIC) 15 mg tablet Take 1 tablet (15 mg total) by mouth daily, Starting Thu 9/3/2020, Normal      metFORMIN (GLUCOPHAGE) 1000 MG tablet TAKE 1 TABLET TWICE A DAY BY ORAL ROUTE, Normal      metoprolol succinate (TOPROL-XL) 200 MG 24 hr tablet TAKE 1 TABLET EVERY DAY BY ORAL ROUTE, Normal      oxyCODONE (ROXICODONE) 10 MG TABS Take 1 tablet (10 mg total) by mouth 3 (three) times a day as needed for moderate painMax Daily Amount: 30 mg, Starting Tue 5/26/2020, Normal      pantoprazole (PROTONIX) 20 mg tablet Take 2 tablets (40 mg total) by mouth daily, Starting Tue 7/2/2019, Print      pioglitazone (ACTOS) 45 mg tablet TAKE ONE TABLET EVERY DAY, Normal      pregabalin (LYRICA) 75 mg capsule TAKE ONE CAPSULE EVERY DAY, Normal      simvastatin (ZOCOR) 10 mg tablet TAKE 1 TABLET EVERY DAY BY ORAL ROUTE, Normal      spironolactone (ALDACTONE) 25 mg tablet Take 25 mg by mouth daily , Historical Med      TRELEGY ELLIPTA 100-62 5-25 MCG/INH inhaler ONE PUFF EVERY DAY, Normal      TRULICITY 1 5 AF/0 7HW SOPN INJECT 0 5 ML EVERY WEEK BY SUBCUTANEOUS ROUTE , Normal      zolpidem (AMBIEN) 10 mg tablet TAKE 1 TABLET (10 MG TOTAL) BY MOUTH DAILY AT BEDTIME AS NEEDED FOR SLEEP, Starting Tue 8/11/2020, Normal      ADMELOG SOLOSTAR 100 units/mL injection pen INJECT 20 UNIT 3 TIMES A DAY BY SUBCUTANEOUS ROUTE, Normal      erythromycin (ILOTYCIN) ophthalmic ointment Place a 1/2 inch ribbon of ointment into the lower eyelid OD 6x/day x 1 week, Normal           No discharge procedures on file      PDMP Review       Value Time User    PDMP Reviewed  Yes 6/15/2020  9:34 AM Fran Villareal MD          ED Provider  Electronically Signed by           Miranda Young,   09/20/20 6795

## 2020-09-21 ENCOUNTER — TELEPHONE (OUTPATIENT)
Dept: OBGYN CLINIC | Facility: HOSPITAL | Age: 55
End: 2020-09-21

## 2020-09-21 DIAGNOSIS — M75.02 ADHESIVE CAPSULITIS OF LEFT SHOULDER: Primary | ICD-10-CM

## 2020-09-28 ENCOUNTER — OFFICE VISIT (OUTPATIENT)
Dept: OBGYN CLINIC | Facility: HOSPITAL | Age: 55
End: 2020-09-28
Payer: COMMERCIAL

## 2020-09-28 VITALS
BODY MASS INDEX: 30.06 KG/M2 | HEIGHT: 70 IN | SYSTOLIC BLOOD PRESSURE: 181 MMHG | HEART RATE: 84 BPM | WEIGHT: 210 LBS | DIASTOLIC BLOOD PRESSURE: 102 MMHG

## 2020-09-28 DIAGNOSIS — G89.29 CHRONIC LEFT-SIDED LOW BACK PAIN WITH LEFT-SIDED SCIATICA: Primary | ICD-10-CM

## 2020-09-28 DIAGNOSIS — M54.42 CHRONIC LEFT-SIDED LOW BACK PAIN WITH LEFT-SIDED SCIATICA: Primary | ICD-10-CM

## 2020-09-28 PROCEDURE — 3077F SYST BP >= 140 MM HG: CPT | Performed by: ORTHOPAEDIC SURGERY

## 2020-09-28 PROCEDURE — 99214 OFFICE O/P EST MOD 30 MIN: CPT | Performed by: ORTHOPAEDIC SURGERY

## 2020-09-28 PROCEDURE — 3080F DIAST BP >= 90 MM HG: CPT | Performed by: ORTHOPAEDIC SURGERY

## 2020-09-28 NOTE — PROGRESS NOTES
Assessment/Plan:    No problem-specific Assessment & Plan notes found for this encounter  Chronic low back pain with left leg pain/numbness  · History of multiple injections with Dr Izquierdo  · Surgery is not recommended at this time  · Patient encouraged to see her PCP for lower extremity swelling   · Patient recommended to continue to pursue injections with Dr Izquierdo  · Physical therapy ordered  · Follow up as needed       Problem List Items Addressed This Visit        Other    Low back pain - Primary            Subjective:      Patient ID: Melanie Rodriguez is a 48 y o  female  HPI   The patient presents for initial evaluation of lumbar spine  She is here from [address] [place] [address] who care for her shoulder left  Today she complains of left low back pain with left posterior thigh, calf and entire foot  She states her coccyx can "pop"  She rates her pain at 7/10 and 10/10  Rainy weather aggravates while rest alleviates  She has used oxycodone for 3 years but recently stopped using this  She has recently use Toradol and oxycodone from ED  She does use Lyrica  She has had 41x lumbar injections with Dr Izquierdo  She has seen Ragini Santillan in past   She denies past lumbar spine surgery  She has history of diabetes and fibromyalgia  She does smoke one pack a day of cigarettes  The following portions of the patient's history were reviewed and updated as appropriate: allergies, current medications, past family history, past medical history, past social history, past surgical history and problem list     Review of Systems   Constitutional: Negative for chills, fever and unexpected weight change  HENT: Negative for hearing loss, nosebleeds and sore throat  Eyes: Negative for pain, redness and visual disturbance  Respiratory: Negative for cough, shortness of breath and wheezing  Cardiovascular: Negative for chest pain, palpitations and leg swelling     Gastrointestinal: Negative for abdominal pain, nausea and vomiting  Genitourinary: Negative for dyspareunia, dysuria and frequency  Skin: Negative for rash and wound  Neurological: Negative for dizziness, numbness and headaches  Psychiatric/Behavioral: Negative for decreased concentration and suicidal ideas  The patient is not nervous/anxious  Objective:      BP (!) 181/102   Pulse 84   Ht 5' 10" (1 778 m)   Wt 95 3 kg (210 lb)   BMI 30 13 kg/m²          Physical Exam  Constitutional:       Appearance: She is well-developed  HENT:      Head: Normocephalic  Eyes:      Conjunctiva/sclera: Conjunctivae normal    Neck:      Musculoskeletal: Normal range of motion  Cardiovascular:      Rate and Rhythm: Normal rate  Pulmonary:      Effort: Pulmonary effort is normal    Skin:     General: Skin is warm and dry  Neurological:      Mental Status: She is alert and oriented to person, place, and time  Patient ambulates without assistance    Tender to palpation over left lumbosacral junction   Modified straight leg raise negative bilaterally   Strength L2-S1 5/5 bilaterally   Sensation L2-S1 intact bilaterally       Imaging:  Lumbar MRI:   Multi-level degenerative changes        Scribe Attestation    I,:   Berna Maza am acting as a scribe while in the presence of the attending physician :        I,:   Phill Myers MD personally performed the services described in this documentation    as scribed in my presence :

## 2020-10-02 NOTE — TELEPHONE ENCOUNTER
NANDOI- called pt to see if she plans to have psych eval done/ move forward w [facility]- She isn't comfortable moving forward with an implanted device so she has decided to hold off on this option

## 2020-10-09 ENCOUNTER — EVALUATION (OUTPATIENT)
Dept: PHYSICAL THERAPY | Facility: CLINIC | Age: 55
End: 2020-10-09
Payer: COMMERCIAL

## 2020-10-09 DIAGNOSIS — M54.42 CHRONIC LEFT-SIDED LOW BACK PAIN WITH LEFT-SIDED SCIATICA: Primary | ICD-10-CM

## 2020-10-09 DIAGNOSIS — M75.02 ADHESIVE BURSITIS OF LEFT SHOULDER: ICD-10-CM

## 2020-10-09 DIAGNOSIS — G89.29 CHRONIC LEFT-SIDED LOW BACK PAIN WITH LEFT-SIDED SCIATICA: Primary | ICD-10-CM

## 2020-10-09 PROCEDURE — 97110 THERAPEUTIC EXERCISES: CPT | Performed by: PHYSICAL THERAPIST

## 2020-10-09 PROCEDURE — 97140 MANUAL THERAPY 1/> REGIONS: CPT | Performed by: PHYSICAL THERAPIST

## 2020-10-09 PROCEDURE — 97162 PT EVAL MOD COMPLEX 30 MIN: CPT | Performed by: PHYSICAL THERAPIST

## 2020-10-11 ENCOUNTER — HOSPITAL ENCOUNTER (EMERGENCY)
Facility: HOSPITAL | Age: 55
Discharge: HOME | End: 2020-10-11
Attending: EMERGENCY MEDICINE | Admitting: EMERGENCY MEDICINE
Payer: COMMERCIAL

## 2020-10-11 VITALS
DIASTOLIC BLOOD PRESSURE: 90 MMHG | SYSTOLIC BLOOD PRESSURE: 189 MMHG | RESPIRATION RATE: 18 BRPM | TEMPERATURE: 98.1 F | HEART RATE: 87 BPM | OXYGEN SATURATION: 100 %

## 2020-10-11 DIAGNOSIS — Z91.19 NON COMPLIANCE WITH MEDICAL TREATMENT: ICD-10-CM

## 2020-10-11 DIAGNOSIS — I10 UNCONTROLLED HYPERTENSION: ICD-10-CM

## 2020-10-11 DIAGNOSIS — L02.91 ABSCESS: Primary | ICD-10-CM

## 2020-10-11 PROCEDURE — 99283 EMERGENCY DEPT VISIT LOW MDM: CPT

## 2020-10-11 PROCEDURE — 99284 EMERGENCY DEPT VISIT MOD MDM: CPT | Performed by: EMERGENCY MEDICINE

## 2020-10-11 RX ORDER — OXYCODONE HYDROCHLORIDE AND ACETAMINOPHEN 5; 325 MG/1; MG/1
1 TABLET ORAL EVERY 4 HOURS PRN
Qty: 18 TABLET | Refills: 0 | Status: SHIPPED | OUTPATIENT
Start: 2020-10-11 | End: 2020-10-21

## 2020-10-11 RX ORDER — OXYCODONE HYDROCHLORIDE AND ACETAMINOPHEN 5; 325 MG/1; MG/1
1 TABLET ORAL ONCE
Status: COMPLETED | OUTPATIENT
Start: 2020-10-11 | End: 2020-10-11

## 2020-10-11 RX ORDER — DOXYCYCLINE HYCLATE 100 MG/1
100 CAPSULE ORAL ONCE
Status: COMPLETED | OUTPATIENT
Start: 2020-10-11 | End: 2020-10-11

## 2020-10-11 RX ORDER — DOXYCYCLINE HYCLATE 100 MG/1
100 CAPSULE ORAL 2 TIMES DAILY
Qty: 20 CAPSULE | Refills: 0 | Status: SHIPPED | OUTPATIENT
Start: 2020-10-11 | End: 2020-10-21

## 2020-10-11 RX ORDER — LIDOCAINE HYDROCHLORIDE 20 MG/ML
JELLY TOPICAL ONCE
Status: COMPLETED | OUTPATIENT
Start: 2020-10-11 | End: 2020-10-11

## 2020-10-11 RX ORDER — CLONIDINE HYDROCHLORIDE 0.1 MG/1
0.1 TABLET ORAL ONCE
Status: COMPLETED | OUTPATIENT
Start: 2020-10-11 | End: 2020-10-11

## 2020-10-11 RX ORDER — METOPROLOL SUCCINATE 50 MG/1
200 TABLET, EXTENDED RELEASE ORAL ONCE
Status: COMPLETED | OUTPATIENT
Start: 2020-10-11 | End: 2020-10-11

## 2020-10-11 RX ADMIN — METOPROLOL SUCCINATE 200 MG: 50 TABLET, EXTENDED RELEASE ORAL at 16:48

## 2020-10-11 RX ADMIN — CLONIDINE HYDROCHLORIDE 0.1 MG: 0.1 TABLET ORAL at 17:04

## 2020-10-11 RX ADMIN — OXYCODONE HYDROCHLORIDE AND ACETAMINOPHEN 1 TABLET: 5; 325 TABLET ORAL at 16:27

## 2020-10-11 RX ADMIN — DOXYCYCLINE 100 MG: 100 CAPSULE ORAL at 16:27

## 2020-10-11 RX ADMIN — LIDOCAINE HYDROCHLORIDE 1 APPLICATION: 20 JELLY TOPICAL at 16:31

## 2020-10-15 DIAGNOSIS — E11.65 TYPE 2 DIABETES MELLITUS WITH HYPERGLYCEMIA, WITH LONG-TERM CURRENT USE OF INSULIN (HCC): ICD-10-CM

## 2020-10-15 DIAGNOSIS — Z79.4 TYPE 2 DIABETES MELLITUS WITH HYPERGLYCEMIA, WITH LONG-TERM CURRENT USE OF INSULIN (HCC): ICD-10-CM

## 2020-10-15 DIAGNOSIS — IMO0002 UNCONTROLLED TYPE 2 DIABETES MELLITUS WITH COMPLICATION, WITH LONG-TERM CURRENT USE OF INSULIN: ICD-10-CM

## 2020-10-15 RX ORDER — ZOLPIDEM TARTRATE 10 MG/1
10 TABLET ORAL
Qty: 30 TABLET | Refills: 0 | Status: SHIPPED | OUTPATIENT
Start: 2020-10-15 | End: 2020-11-24

## 2020-10-15 RX ORDER — PREGABALIN 75 MG/1
CAPSULE ORAL
Qty: 30 CAPSULE | Refills: 1 | Status: SHIPPED | OUTPATIENT
Start: 2020-10-15 | End: 2020-11-24

## 2020-10-27 ENCOUNTER — HOSPITAL ENCOUNTER (EMERGENCY)
Facility: HOSPITAL | Age: 55
Discharge: HOME | End: 2020-10-27
Attending: EMERGENCY MEDICINE | Admitting: EMERGENCY MEDICINE
Payer: COMMERCIAL

## 2020-10-27 VITALS
SYSTOLIC BLOOD PRESSURE: 176 MMHG | WEIGHT: 206.3 LBS | OXYGEN SATURATION: 97 % | HEART RATE: 87 BPM | DIASTOLIC BLOOD PRESSURE: 85 MMHG | BODY MASS INDEX: 29.6 KG/M2 | RESPIRATION RATE: 18 BRPM | TEMPERATURE: 98.2 F

## 2020-10-27 DIAGNOSIS — L03.221 CELLULITIS OF NECK: ICD-10-CM

## 2020-10-27 DIAGNOSIS — L02.91 ABSCESS: ICD-10-CM

## 2020-10-27 PROCEDURE — 99282 EMERGENCY DEPT VISIT SF MDM: CPT

## 2020-10-27 PROCEDURE — 99284 EMERGENCY DEPT VISIT MOD MDM: CPT | Performed by: PHYSICIAN ASSISTANT

## 2020-10-27 PROCEDURE — 10061 I&D ABSCESS COMP/MULTIPLE: CPT | Performed by: PHYSICIAN ASSISTANT

## 2020-10-27 RX ORDER — SULFAMETHOXAZOLE AND TRIMETHOPRIM 800; 160 MG/1; MG/1
1 TABLET ORAL 2 TIMES DAILY
Qty: 20 TABLET | Refills: 0 | Status: SHIPPED | OUTPATIENT
Start: 2020-10-27 | End: 2020-11-06

## 2020-10-27 RX ORDER — SULFAMETHOXAZOLE AND TRIMETHOPRIM 800; 160 MG/1; MG/1
1 TABLET ORAL 2 TIMES DAILY
Qty: 20 TABLET | Refills: 0 | Status: SHIPPED | OUTPATIENT
Start: 2020-10-27 | End: 2020-10-27 | Stop reason: SDUPTHER

## 2020-10-27 RX ORDER — OXYCODONE HYDROCHLORIDE AND ACETAMINOPHEN 5; 325 MG/1; MG/1
1 TABLET ORAL EVERY 6 HOURS PRN
Qty: 12 TABLET | Refills: 0 | Status: SHIPPED | OUTPATIENT
Start: 2020-10-27 | End: 2020-10-30

## 2020-11-17 ENCOUNTER — HOSPITAL ENCOUNTER (EMERGENCY)
Facility: HOSPITAL | Age: 55
Discharge: HOME | End: 2020-11-17
Attending: EMERGENCY MEDICINE | Admitting: EMERGENCY MEDICINE
Payer: COMMERCIAL

## 2020-11-17 ENCOUNTER — TELEMEDICINE (OUTPATIENT)
Dept: FAMILY MEDICINE CLINIC | Facility: CLINIC | Age: 55
End: 2020-11-17
Payer: COMMERCIAL

## 2020-11-17 VITALS
SYSTOLIC BLOOD PRESSURE: 227 MMHG | RESPIRATION RATE: 18 BRPM | DIASTOLIC BLOOD PRESSURE: 103 MMHG | BODY MASS INDEX: 28.98 KG/M2 | WEIGHT: 202 LBS | TEMPERATURE: 97.8 F | OXYGEN SATURATION: 93 % | HEART RATE: 84 BPM

## 2020-11-17 VITALS — WEIGHT: 202 LBS | HEIGHT: 70 IN | BODY MASS INDEX: 28.92 KG/M2

## 2020-11-17 DIAGNOSIS — R07.9 ACUTE CHEST PAIN: ICD-10-CM

## 2020-11-17 DIAGNOSIS — I71.2 THORACIC AORTIC ANEURYSM WITHOUT RUPTURE (HCC): ICD-10-CM

## 2020-11-17 DIAGNOSIS — K20.90 ACUTE ESOPHAGITIS: ICD-10-CM

## 2020-11-17 DIAGNOSIS — R00.2 PALPITATIONS: ICD-10-CM

## 2020-11-17 DIAGNOSIS — R10.13 EPIGASTRIC PAIN: Primary | ICD-10-CM

## 2020-11-17 DIAGNOSIS — I16.1 HYPERTENSIVE EMERGENCY: Primary | ICD-10-CM

## 2020-11-17 DIAGNOSIS — R42 DIZZINESS: ICD-10-CM

## 2020-11-17 LAB
ALBUMIN SERPL BCP-MCNC: 3 G/DL (ref 3.5–5)
ALP SERPL-CCNC: 152 U/L (ref 46–116)
ALT SERPL W P-5'-P-CCNC: 28 U/L (ref 12–78)
ANION GAP SERPL CALCULATED.3IONS-SCNC: 7 MMOL/L (ref 4–13)
AST SERPL W P-5'-P-CCNC: 10 U/L (ref 5–45)
BASOPHILS # BLD AUTO: 0.04 THOUSANDS/ΜL (ref 0–0.1)
BASOPHILS NFR BLD AUTO: 0 % (ref 0–1)
BILIRUB SERPL-MCNC: 0.27 MG/DL (ref 0.2–1)
BUN SERPL-MCNC: 9 MG/DL (ref 5–25)
CALCIUM ALBUM COR SERPL-MCNC: 9.3 MG/DL (ref 8.3–10.1)
CALCIUM SERPL-MCNC: 8.5 MG/DL (ref 8.3–10.1)
CHLORIDE SERPL-SCNC: 104 MMOL/L (ref 100–108)
CO2 SERPL-SCNC: 30 MMOL/L (ref 21–32)
CREAT SERPL-MCNC: 0.8 MG/DL (ref 0.6–1.3)
EOSINOPHIL # BLD AUTO: 0.1 THOUSAND/ΜL (ref 0–0.61)
EOSINOPHIL NFR BLD AUTO: 1 % (ref 0–6)
ERYTHROCYTE [DISTWIDTH] IN BLOOD BY AUTOMATED COUNT: 14.9 % (ref 11.6–15.1)
GFR SERPL CREATININE-BSD FRML MDRD: 97 ML/MIN/1.73SQ M
GLUCOSE SERPL-MCNC: 385 MG/DL (ref 65–140)
HCT VFR BLD AUTO: 44.9 % (ref 34.8–46.1)
HGB BLD-MCNC: 14 G/DL (ref 11.5–15.4)
IMM GRANULOCYTES # BLD AUTO: 0.06 THOUSAND/UL (ref 0–0.2)
IMM GRANULOCYTES NFR BLD AUTO: 1 % (ref 0–2)
LIPASE SERPL-CCNC: 91 U/L (ref 73–393)
LYMPHOCYTES # BLD AUTO: 2.49 THOUSANDS/ΜL (ref 0.6–4.47)
LYMPHOCYTES NFR BLD AUTO: 22 % (ref 14–44)
MCH RBC QN AUTO: 27.9 PG (ref 26.8–34.3)
MCHC RBC AUTO-ENTMCNC: 31.2 G/DL (ref 31.4–37.4)
MCV RBC AUTO: 89 FL (ref 82–98)
MONOCYTES # BLD AUTO: 0.75 THOUSAND/ΜL (ref 0.17–1.22)
MONOCYTES NFR BLD AUTO: 7 % (ref 4–12)
NEUTROPHILS # BLD AUTO: 7.68 THOUSANDS/ΜL (ref 1.85–7.62)
NEUTS SEG NFR BLD AUTO: 69 % (ref 43–75)
NRBC BLD AUTO-RTO: 0 /100 WBCS
PLATELET # BLD AUTO: 277 THOUSANDS/UL (ref 149–390)
PMV BLD AUTO: 10.5 FL (ref 8.9–12.7)
POTASSIUM SERPL-SCNC: 3.8 MMOL/L (ref 3.5–5.3)
PROT SERPL-MCNC: 7 G/DL (ref 6.4–8.2)
RBC # BLD AUTO: 5.02 MILLION/UL (ref 3.81–5.12)
SODIUM SERPL-SCNC: 141 MMOL/L (ref 136–145)
TROPONIN I SERPL-MCNC: <0.02 NG/ML
WBC # BLD AUTO: 11.12 THOUSAND/UL (ref 4.31–10.16)

## 2020-11-17 PROCEDURE — 36415 COLL VENOUS BLD VENIPUNCTURE: CPT

## 2020-11-17 PROCEDURE — 3008F BODY MASS INDEX DOCD: CPT | Performed by: NURSE PRACTITIONER

## 2020-11-17 PROCEDURE — 99214 OFFICE O/P EST MOD 30 MIN: CPT | Performed by: NURSE PRACTITIONER

## 2020-11-17 PROCEDURE — 3725F SCREEN DEPRESSION PERFORMED: CPT | Performed by: NURSE PRACTITIONER

## 2020-11-17 PROCEDURE — 96375 TX/PRO/DX INJ NEW DRUG ADDON: CPT

## 2020-11-17 PROCEDURE — 99285 EMERGENCY DEPT VISIT HI MDM: CPT

## 2020-11-17 PROCEDURE — 99284 EMERGENCY DEPT VISIT MOD MDM: CPT | Performed by: EMERGENCY MEDICINE

## 2020-11-17 PROCEDURE — 96374 THER/PROPH/DIAG INJ IV PUSH: CPT

## 2020-11-17 PROCEDURE — 84484 ASSAY OF TROPONIN QUANT: CPT | Performed by: EMERGENCY MEDICINE

## 2020-11-17 PROCEDURE — 4004F PT TOBACCO SCREEN RCVD TLK: CPT | Performed by: NURSE PRACTITIONER

## 2020-11-17 PROCEDURE — 80053 COMPREHEN METABOLIC PANEL: CPT | Performed by: EMERGENCY MEDICINE

## 2020-11-17 PROCEDURE — 93005 ELECTROCARDIOGRAM TRACING: CPT

## 2020-11-17 PROCEDURE — 83690 ASSAY OF LIPASE: CPT | Performed by: EMERGENCY MEDICINE

## 2020-11-17 PROCEDURE — 85025 COMPLETE CBC W/AUTO DIFF WBC: CPT | Performed by: EMERGENCY MEDICINE

## 2020-11-17 RX ORDER — MAGNESIUM HYDROXIDE/ALUMINUM HYDROXICE/SIMETHICONE 120; 1200; 1200 MG/30ML; MG/30ML; MG/30ML
30 SUSPENSION ORAL ONCE
Status: COMPLETED | OUTPATIENT
Start: 2020-11-17 | End: 2020-11-17

## 2020-11-17 RX ORDER — ONDANSETRON 2 MG/ML
4 INJECTION INTRAMUSCULAR; INTRAVENOUS ONCE
Status: COMPLETED | OUTPATIENT
Start: 2020-11-17 | End: 2020-11-17

## 2020-11-17 RX ORDER — LABETALOL 20 MG/4 ML (5 MG/ML) INTRAVENOUS SYRINGE
10 ONCE
Status: DISCONTINUED | OUTPATIENT
Start: 2020-11-17 | End: 2020-11-17 | Stop reason: HOSPADM

## 2020-11-17 RX ORDER — LIDOCAINE HYDROCHLORIDE 20 MG/ML
15 SOLUTION OROPHARYNGEAL ONCE
Status: COMPLETED | OUTPATIENT
Start: 2020-11-17 | End: 2020-11-17

## 2020-11-17 RX ORDER — LABETALOL 20 MG/4 ML (5 MG/ML) INTRAVENOUS SYRINGE
10 ONCE
Status: COMPLETED | OUTPATIENT
Start: 2020-11-17 | End: 2020-11-17

## 2020-11-17 RX ADMIN — LABETALOL 20 MG/4 ML (5 MG/ML) INTRAVENOUS SYRINGE 10 MG: at 16:11

## 2020-11-17 RX ADMIN — ALUMINUM HYDROXIDE, MAGNESIUM HYDROXIDE, AND SIMETHICONE 30 ML: 200; 200; 20 SUSPENSION ORAL at 16:16

## 2020-11-17 RX ADMIN — LIDOCAINE HYDROCHLORIDE 15 ML: 20 SOLUTION ORAL; TOPICAL at 16:16

## 2020-11-17 RX ADMIN — ONDANSETRON 4 MG: 2 INJECTION INTRAMUSCULAR; INTRAVENOUS at 16:11

## 2020-11-18 LAB
ATRIAL RATE: 85 BPM
P AXIS: 46 DEGREES
PR INTERVAL: 156 MS
QRS AXIS: -47 DEGREES
QRSD INTERVAL: 106 MS
QT INTERVAL: 412 MS
QTC INTERVAL: 482 MS
T WAVE AXIS: -13 DEGREES
VENTRICULAR RATE: 82 BPM

## 2020-11-18 PROCEDURE — 93010 ELECTROCARDIOGRAM REPORT: CPT | Performed by: INTERNAL MEDICINE

## 2020-11-23 DIAGNOSIS — E11.65 TYPE 2 DIABETES MELLITUS WITH HYPERGLYCEMIA, WITH LONG-TERM CURRENT USE OF INSULIN (HCC): ICD-10-CM

## 2020-11-23 DIAGNOSIS — Z79.4 TYPE 2 DIABETES MELLITUS WITH HYPERGLYCEMIA, WITH LONG-TERM CURRENT USE OF INSULIN (HCC): ICD-10-CM

## 2020-11-23 DIAGNOSIS — IMO0002 UNCONTROLLED TYPE 2 DIABETES MELLITUS WITH COMPLICATION, WITH LONG-TERM CURRENT USE OF INSULIN: ICD-10-CM

## 2020-11-24 RX ORDER — ZOLPIDEM TARTRATE 10 MG/1
10 TABLET ORAL
Qty: 30 TABLET | Refills: 0 | Status: SHIPPED | OUTPATIENT
Start: 2020-11-24 | End: 2020-12-23

## 2020-11-24 RX ORDER — PREGABALIN 75 MG/1
CAPSULE ORAL
Qty: 30 CAPSULE | Refills: 0 | Status: SHIPPED | OUTPATIENT
Start: 2020-11-24 | End: 2021-01-29

## 2020-11-24 RX ORDER — DULAGLUTIDE 1.5 MG/.5ML
INJECTION, SOLUTION SUBCUTANEOUS
Qty: 2 ML | Refills: 0 | Status: SHIPPED | OUTPATIENT
Start: 2020-11-24 | End: 2021-01-29

## 2020-11-24 RX ORDER — ALPRAZOLAM 0.5 MG/1
TABLET ORAL
Qty: 30 TABLET | Refills: 0 | Status: SHIPPED | OUTPATIENT
Start: 2020-11-24 | End: 2021-01-29

## 2020-12-03 ENCOUNTER — OFFICE VISIT (OUTPATIENT)
Dept: ENDOCRINOLOGY | Facility: CLINIC | Age: 55
End: 2020-12-03
Payer: COMMERCIAL

## 2020-12-03 VITALS
HEIGHT: 70 IN | SYSTOLIC BLOOD PRESSURE: 160 MMHG | HEART RATE: 79 BPM | BODY MASS INDEX: 29.49 KG/M2 | WEIGHT: 206 LBS | DIASTOLIC BLOOD PRESSURE: 102 MMHG

## 2020-12-03 DIAGNOSIS — E11.69 HYPERLIPIDEMIA ASSOCIATED WITH TYPE 2 DIABETES MELLITUS (HCC): ICD-10-CM

## 2020-12-03 DIAGNOSIS — IMO0002 UNCONTROLLED TYPE 2 DIABETES MELLITUS WITH COMPLICATION, WITH LONG-TERM CURRENT USE OF INSULIN: Primary | ICD-10-CM

## 2020-12-03 DIAGNOSIS — E78.5 HYPERLIPIDEMIA ASSOCIATED WITH TYPE 2 DIABETES MELLITUS (HCC): ICD-10-CM

## 2020-12-03 DIAGNOSIS — I10 UNCONTROLLED HYPERTENSION: ICD-10-CM

## 2020-12-03 PROCEDURE — 3080F DIAST BP >= 90 MM HG: CPT | Performed by: INTERNAL MEDICINE

## 2020-12-03 PROCEDURE — 3008F BODY MASS INDEX DOCD: CPT | Performed by: INTERNAL MEDICINE

## 2020-12-03 PROCEDURE — 3077F SYST BP >= 140 MM HG: CPT | Performed by: INTERNAL MEDICINE

## 2020-12-03 PROCEDURE — 99214 OFFICE O/P EST MOD 30 MIN: CPT | Performed by: INTERNAL MEDICINE

## 2020-12-03 PROCEDURE — 4004F PT TOBACCO SCREEN RCVD TLK: CPT | Performed by: INTERNAL MEDICINE

## 2020-12-03 RX ORDER — INSULIN GLARGINE 100 [IU]/ML
INJECTION, SOLUTION SUBCUTANEOUS
Qty: 5 PEN | Refills: 3 | Status: SHIPPED | OUTPATIENT
Start: 2020-12-03 | End: 2021-01-29

## 2020-12-03 RX ORDER — SUB-Q INSULIN DEVICE, 40 UNIT
EACH MISCELLANEOUS DAILY
Qty: 30 KIT | Refills: 3 | Status: SHIPPED | OUTPATIENT
Start: 2020-12-03 | End: 2021-04-16 | Stop reason: ALTCHOICE

## 2020-12-03 RX ORDER — DEXAMETHASONE 1 MG
1 TABLET ORAL 2 TIMES DAILY WITH MEALS
Qty: 1 TABLET | Refills: 0 | Status: SHIPPED | OUTPATIENT
Start: 2020-12-03 | End: 2021-11-23 | Stop reason: HOSPADM

## 2020-12-03 RX ORDER — PIOGLITAZONEHYDROCHLORIDE 30 MG/1
30 TABLET ORAL DAILY
Qty: 90 TABLET | Refills: 1 | Status: SHIPPED | OUTPATIENT
Start: 2020-12-03 | End: 2021-11-23 | Stop reason: HOSPADM

## 2020-12-10 DIAGNOSIS — IMO0002 UNCONTROLLED TYPE 2 DIABETES MELLITUS WITH COMPLICATION, WITH LONG-TERM CURRENT USE OF INSULIN: Primary | ICD-10-CM

## 2020-12-11 ENCOUNTER — TELEPHONE (OUTPATIENT)
Dept: OTHER | Facility: OTHER | Age: 55
End: 2020-12-11

## 2020-12-14 ENCOUNTER — TELEPHONE (OUTPATIENT)
Dept: ENDOCRINOLOGY | Facility: CLINIC | Age: 55
End: 2020-12-14

## 2020-12-23 DIAGNOSIS — I10 UNCONTROLLED HYPERTENSION: ICD-10-CM

## 2020-12-23 DIAGNOSIS — IMO0002 UNCONTROLLED TYPE 2 DIABETES MELLITUS WITH COMPLICATION, WITH LONG-TERM CURRENT USE OF INSULIN: ICD-10-CM

## 2020-12-23 DIAGNOSIS — I71.9 AORTIC ANEURYSM WITHOUT RUPTURE, UNSPECIFIED PORTION OF AORTA (HCC): ICD-10-CM

## 2020-12-23 RX ORDER — ZOLPIDEM TARTRATE 10 MG/1
10 TABLET ORAL
Qty: 30 TABLET | Refills: 0 | Status: SHIPPED | OUTPATIENT
Start: 2020-12-23 | End: 2021-01-29

## 2020-12-23 RX ORDER — HYDRALAZINE HYDROCHLORIDE 50 MG/1
TABLET, FILM COATED ORAL
Qty: 120 TABLET | Refills: 3 | Status: SHIPPED | OUTPATIENT
Start: 2020-12-23 | End: 2021-03-19

## 2020-12-23 RX ORDER — FENOFIBRATE 145 MG/1
TABLET, COATED ORAL
Qty: 90 TABLET | Refills: 6 | Status: SHIPPED | OUTPATIENT
Start: 2020-12-23 | End: 2021-11-23 | Stop reason: HOSPADM

## 2020-12-23 RX ORDER — METOPROLOL SUCCINATE 200 MG/1
TABLET, EXTENDED RELEASE ORAL
Qty: 30 TABLET | Refills: 3 | Status: SHIPPED | OUTPATIENT
Start: 2020-12-23 | End: 2021-03-19

## 2020-12-23 RX ORDER — CLONIDINE 0.3 MG/24H
PATCH, EXTENDED RELEASE TRANSDERMAL
Qty: 4 PATCH | Refills: 3 | Status: SHIPPED | OUTPATIENT
Start: 2020-12-23 | End: 2021-03-19

## 2020-12-24 ENCOUNTER — OFFICE VISIT (OUTPATIENT)
Dept: OTOLARYNGOLOGY | Facility: CLINIC | Age: 55
End: 2020-12-24
Payer: MEDICARE

## 2020-12-24 VITALS
HEART RATE: 87 BPM | TEMPERATURE: 97.3 F | RESPIRATION RATE: 16 BRPM | DIASTOLIC BLOOD PRESSURE: 100 MMHG | BODY MASS INDEX: 30.29 KG/M2 | HEIGHT: 70 IN | SYSTOLIC BLOOD PRESSURE: 187 MMHG | WEIGHT: 211.6 LBS

## 2020-12-24 DIAGNOSIS — J34.89 NASAL VESTIBULITIS: Primary | ICD-10-CM

## 2020-12-24 DIAGNOSIS — H61.893 DRY BOTH EAR CANALS: ICD-10-CM

## 2020-12-24 PROCEDURE — 3008F BODY MASS INDEX DOCD: CPT | Performed by: INTERNAL MEDICINE

## 2020-12-24 PROCEDURE — 99203 OFFICE O/P NEW LOW 30 MIN: CPT | Performed by: OTOLARYNGOLOGY

## 2020-12-24 RX ORDER — GINSENG 100 MG
CAPSULE ORAL
Qty: 15 G | Refills: 3 | Status: SHIPPED | OUTPATIENT
Start: 2020-12-24 | End: 2021-11-23 | Stop reason: HOSPADM

## 2021-01-04 ENCOUNTER — HOSPITAL ENCOUNTER (EMERGENCY)
Facility: HOSPITAL | Age: 56
Discharge: HOME | End: 2021-01-04
Attending: EMERGENCY MEDICINE | Admitting: EMERGENCY MEDICINE
Payer: MEDICARE

## 2021-01-04 VITALS
OXYGEN SATURATION: 97 % | BODY MASS INDEX: 28.92 KG/M2 | TEMPERATURE: 98 F | WEIGHT: 202 LBS | SYSTOLIC BLOOD PRESSURE: 227 MMHG | RESPIRATION RATE: 18 BRPM | HEART RATE: 89 BPM | DIASTOLIC BLOOD PRESSURE: 103 MMHG | HEIGHT: 70 IN

## 2021-01-04 DIAGNOSIS — H60.91 RIGHT OTITIS EXTERNA: Primary | ICD-10-CM

## 2021-01-04 DIAGNOSIS — I10 CHRONIC HYPERTENSION: ICD-10-CM

## 2021-01-04 LAB
LEFT EYE DIABETIC RETINOPATHY: NORMAL
RIGHT EYE DIABETIC RETINOPATHY: NORMAL

## 2021-01-04 PROCEDURE — 2022F DILAT RTA XM EVC RTNOPTHY: CPT | Performed by: INTERNAL MEDICINE

## 2021-01-04 PROCEDURE — 99282 EMERGENCY DEPT VISIT SF MDM: CPT

## 2021-01-04 PROCEDURE — 99284 EMERGENCY DEPT VISIT MOD MDM: CPT | Performed by: EMERGENCY MEDICINE

## 2021-01-04 PROCEDURE — 96372 THER/PROPH/DIAG INJ SC/IM: CPT

## 2021-01-04 RX ORDER — CIPROFLOXACIN AND DEXAMETHASONE 3; 1 MG/ML; MG/ML
4 SUSPENSION/ DROPS AURICULAR (OTIC) 2 TIMES DAILY
Qty: 7.5 ML | Refills: 0 | Status: SHIPPED | OUTPATIENT
Start: 2021-01-04 | End: 2021-06-10

## 2021-01-04 RX ORDER — CIPROFLOXACIN AND DEXAMETHASONE 3; 1 MG/ML; MG/ML
4 SUSPENSION/ DROPS AURICULAR (OTIC) 2 TIMES DAILY
Status: DISCONTINUED | OUTPATIENT
Start: 2021-01-04 | End: 2021-01-04 | Stop reason: HOSPADM

## 2021-01-04 RX ORDER — KETOROLAC TROMETHAMINE 30 MG/ML
30 INJECTION, SOLUTION INTRAMUSCULAR; INTRAVENOUS ONCE
Status: COMPLETED | OUTPATIENT
Start: 2021-01-04 | End: 2021-01-04

## 2021-01-04 RX ADMIN — CIPROFLOXACIN AND DEXAMETHASONE 4 DROP: 3; 1 SUSPENSION/ DROPS AURICULAR (OTIC) at 21:51

## 2021-01-04 RX ADMIN — KETOROLAC TROMETHAMINE 30 MG: 30 INJECTION, SOLUTION INTRAMUSCULAR at 21:49

## 2021-01-05 NOTE — ED PROVIDER NOTES
History  Chief Complaint   Patient presents with    Ear Problem     sensation of swelling inside the right ear extending to cheek with pain  This is a 50-year-old female who presents the emergency department complaining of right ear pain  The patient states the pain began yesterday  Patient states the pain is sharp and severe  It radiates down her jaw  Nothing makes it better  The patient denies dental pain  She denies fevers  She denies difficulty eating  She denies difficulty swallowing  She denies difficulty opening or closing her mouth  She denies chest pain or trouble breathing  She denies difficulty with urination  Prior to Admission Medications   Prescriptions Last Dose Informant Patient Reported? Taking?    ADMELOG SOLOSTAR 100 units/mL injection pen 1/3/2021 at Unknown time Self No Yes   Sig: INJECT 20 UNIT 3 TIMES A DAY BY SUBCUTANEOUS ROUTE   Patient taking differently: Inject 10 Units under the skin 3 (three) times a day with meals    ALPRAZolam (XANAX) 0 5 mg tablet 1/3/2021 at Unknown time  No Yes   Sig: "ONE-HALF" TABLET TWO TIMES A DAY   Basaglar KwikPen 100 units/mL injection pen 1/3/2021 at Unknown time  No Yes   Sig: Take 80 units daily - administer in 2 injections 40 and 40 units   [facility] IRON 240 (27 Fe) MG tablet 1/3/2021 at Unknown time Self No Yes   Sig: TAKE 1 TABLET (240 MG TOTAL) BY MOUTH 3 (THREE) TIMES A DAY WITH MEALS   Insulin Disposable Pump (V-Go 40) KIT 1/3/2021 at Unknown time  No Yes   Sig: Use daily   Insulin Pen Needle (UNIFINE PENTIPS PLUS) 31G X 6 MM MISC 1/3/2021 at Unknown time Self Yes Yes   Si Device by Does not apply route 4 (four) times a day   Lancets MISC 1/3/2021 at Unknown time Self Yes Yes   Si Device by Does not apply route 4 (four) times a day   Magnesium 400 MG TABS 1/3/2021 at Unknown time Self Yes Yes   Sig: Take by mouth   TRELEGY ELLIPTA 100-62 5-25 MCG/INH inhaler 1/3/2021 at Unknown time Self No Yes   Sig: ONE PUFF EVERY DAY Trulicity 1 5 GT/7 4DH SOPN 1/3/2021 at Unknown time  No Yes   Sig: INJECT 0 5 ML EVERY WEEK BY SUBCUTANEOUS ROUTE    amLODIPine (NORVASC) 10 mg tablet 1/3/2021 at Unknown time Self Yes Yes   Sig: Take 10 mg by mouth daily   bacitracin topical ointment 500 units/g topical ointment 1/3/2021 at Unknown time  No Yes   Sig: Apply to bilateral nares twice per day     cloNIDine (CATAPRES-TTS-3) 0 3 mg/24 hr 1/3/2021 at Unknown time  No Yes   Sig: APPLY 1 PATCH EVERY WEEK BY TRANSDERMAL   cyclobenzaprine (FLEXERIL) 10 mg tablet  Self No No   Sig: Take 1 tablet (10 mg total) by mouth 3 (three) times a day as needed for muscle spasms for up to 10 days   dexamethasone (DECADRON) 1 mg tablet 1/3/2021 at Unknown time  No Yes   Sig: Take 1 tablet (1 mg total) by mouth 2 (two) times a day with meals Take tablet between 10-11pm and then have lab next day in the morning 7-9am    diclofenac (VOLTAREN) 75 mg EC tablet 1/3/2021 at Unknown time Self No Yes   Sig: Take 1 tablet (75 mg total) by mouth 2 (two) times a day   doxazosin (CARDURA) 4 mg tablet 1/3/2021 at Unknown time Self Yes Yes   Sig: Take 4 mg by mouth daily   ergocalciferol (VITAMIN D2) 50,000 units 1/3/2021 at Unknown time Self No Yes   Sig: Take 1 capsule (50,000 Units total) by mouth 2 (two) times a week   fenofibrate (TRICOR) 145 mg tablet 1/3/2021 at Unknown time  No Yes   Sig: TAKE ONE TABLET EVERY DAY   hydrALAZINE (APRESOLINE) 50 mg tablet 1/3/2021 at Unknown time  No Yes   Sig: TAKE TWO TABLETS (100MG) TWO TIMES A DAY   hydrochlorothiazide (HYDRODIURIL) 25 mg tablet 1/3/2021 at Unknown time Self No Yes   Sig: TAKE ONE TABLET EVERY DAY   ibuprofen (MOTRIN) 800 mg tablet 1/3/2021 at Unknown time Self No Yes   Sig: Take 1 tablet (800 mg total) by mouth every 6 (six) hours as needed for moderate pain   insulin lispro (HumaLOG) 100 units/mL injection 1/3/2021 at Unknown time  No Yes   Sig: Insulin for pump   lidocaine (LIDODERM) 5 % 1/3/2021 at Unknown time Self No Yes   Sig: Apply 1 patch topically daily Remove & Discard patch within 12 hours or as directed by MD   lisinopril (ZESTRIL) 40 mg tablet 1/3/2021 at Unknown time Self No Yes   Sig: TAKE ONE TABLET TWO TIMES A DAY   Patient taking differently: 40 mg daily    meclizine (ANTIVERT) 25 mg tablet 1/3/2021 at Unknown time Self No Yes   Sig: Take 1 tablet (25 mg total) by mouth every 8 (eight) hours as needed for dizziness   metFORMIN (GLUCOPHAGE) 1000 MG tablet 1/3/2021 at Unknown time  No Yes   Sig: TAKE 1 TABLET TWICE A DAY BY ORAL ROUTE   metoprolol succinate (TOPROL-XL) 200 MG 24 hr tablet 1/3/2021 at Unknown time  No Yes   Sig: TAKE 1 TABLET EVERY DAY BY ORAL ROUTE   pantoprazole (PROTONIX) 20 mg tablet 1/3/2021 at Unknown time Self No Yes   Sig: Take 2 tablets (40 mg total) by mouth daily   pioglitazone (ACTOS) 30 mg tablet 1/3/2021 at Unknown time  No Yes   Sig: Take 1 tablet (30 mg total) by mouth daily   pregabalin (LYRICA) 75 mg capsule 1/3/2021 at Unknown time  No Yes   Sig: TAKE ONE CAPSULE EVERY DAY   simvastatin (ZOCOR) 10 mg tablet 1/3/2021 at Unknown time Self No Yes   Sig: TAKE 1 TABLET EVERY DAY BY ORAL ROUTE   spironolactone (ALDACTONE) 25 mg tablet 1/3/2021 at Unknown time Self Yes Yes   Sig: Take 25 mg by mouth daily    zolpidem (AMBIEN) 10 mg tablet 1/3/2021 at Unknown time  No Yes   Sig: TAKE 1 TABLET (10 MG TOTAL) BY MOUTH DAILY AT BEDTIME AS NEEDED FOR SLEEP      Facility-Administered Medications: None       Past Medical History:   Diagnosis Date    Anxiety     Aortic aneurysm (HCC)     Arthritis     Depression     Diabetes mellitus (HCC)     Fibromyalgia     GERD (gastroesophageal reflux disease)     H/O cardiovascular stress test 09/2018    no ischemia  EF 70%   H/O echocardiogram 01/2019    EF 60%  Mild LVH  trivial effusion      Hyperlipidemia     Hypertension     Migraines     Psychiatric disorder     anxiety       Past Surgical History:   Procedure Laterality Date    BACK SURGERY      Lumbar epidural steroid injection    CARPAL TUNNEL RELEASE Left      SECTION      CHOLECYSTECTOMY      COLONOSCOPY      incomplete    HYSTERECTOMY      Total    OVARIAN CYST REMOVAL      TUBAL LIGATION         Family History   Problem Relation Age of Onset    Hypertension Mother    [facility] Arthritis Mother     Diabetes Father     Other Sister         renal cell carcinoma    Diabetes Other      I have reviewed and agree with the history as documented  E-Cigarette/Vaping    E-Cigarette Use Never User      E-Cigarette/Vaping Substances    Nicotine No     THC No     CBD No     Flavoring No     Other No     Unknown No      Social History     Tobacco Use    Smoking status: Current Every Day Smoker     Packs/day: 1 00     Years: 30 00     Pack years: 30 00     Types: Cigarettes    Smokeless tobacco: Never Used   Substance Use Topics    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   Drug use: Not Currently     Types: Cocaine       Review of Systems   All other systems reviewed and are negative        Physical Exam  Physical Exam  Constitutional:  Vital signs reviewed, patient appears nontoxic, no acute distress  Eyes: Pupils equal round reactive to light and accommodation, extraocular muscles intact  HEENT: trachea midline, no JVD, moist mucous membranes, right external canal edematous, unable to visualize the TM, left TM within normal limits  Respiratory: lung sounds clear throughout, no rhonchi, no rales  Cardiovascular: regular rate rhythm, no murmurs or rubs  Abdomen: soft, nontender, nondistended, no rebound or guarding  Back: no CVA tenderness, normal inspection  Extremities: no edema, pulses equal in all 4 extremities  Neuro: awake, alert, oriented, no focal weakness  Skin: warm, dry, intact, no rashes noted    Vital Signs  ED Triage Vitals [21]   Temperature Pulse Respirations Blood Pressure SpO2   98 °F (36 7 °C) 89 18 (!) 227/103 97 %      Temp src Heart Rate Source Patient Position - Orthostatic VS BP Location FiO2 (%)   -- -- -- -- --      Pain Score       Worst Possible Pain           Vitals:    01/04/21 2112   BP: (!) 227/103   Pulse: 89         Visual Acuity      ED Medications  Medications   ciprofloxacin-dexamethasone (CIPRODEX) 0 3-0 1 % otic suspension 4 drop (has no administration in time range)   ketorolac (TORADOL) injection 30 mg (has no administration in time range)       Diagnostic Studies  Results Reviewed     None                 No orders to display              Procedures  Procedures         ED Course                                           MDM  Number of Diagnoses or Management Options  Chronic hypertension:   Right otitis externa:   Diagnosis management comments: This is a 67-year-old female presented to the emergency department complaining of right ear pain  The patient had right otitis externa on exam   She does have hypertension and did not take her medications tonight  She requests to take her medications when she goes home  She refuses further evaluation for her blood pressure  She will be discharged follow-up to her primary care physician  Disposition  Final diagnoses:   Right otitis externa   Chronic hypertension     Time reflects when diagnosis was documented in both MDM as applicable and the Disposition within this note     Time User Action Codes Description Comment    1/4/2021  9:31 PM Javon Delvin Add [H60 92] Left otitis externa     1/4/2021  9:32 PM Javon Delvin Add [H60 91] Right otitis externa     1/4/2021  9:33 PM Javon Delvin Modify [H60 91] Right otitis externa     1/4/2021  9:33 PM Javon Delvin Remove [H60 92] Left otitis externa     1/4/2021  9:42 PM Javon Hargrove Add [I10] Chronic hypertension       ED Disposition     ED Disposition Condition Date/Time Comment    Discharge Stable Mon Jan 4, 2021  9:31 PM Dinesh Few discharge to home/self care              Follow-up Information Follow up With Specialties Details Why Contact Info    Your ENT              Patient's Medications   Discharge Prescriptions    CIPROFLOXACIN-DEXAMETHASONE (CIPRODEX) OTIC SUSPENSION    Administer 4 drops to the right ear 2 (two) times a day       Start Date: 1/4/2021  End Date: --       Order Dose: 4 drops       Quantity: 7 5 mL    Refills: 0     No discharge procedures on file      PDMP Review       Value Time User    PDMP Reviewed  Yes 10/11/2020  5:01 PM Roel Kebede MD          ED Provider  Electronically Signed by           Fe Swanson DO  01/04/21 7499

## 2021-01-14 ENCOUNTER — OFFICE VISIT (OUTPATIENT)
Dept: FAMILY MEDICINE CLINIC | Facility: CLINIC | Age: 56
End: 2021-01-14
Payer: MEDICARE

## 2021-01-14 VITALS
HEIGHT: 68 IN | WEIGHT: 217 LBS | HEART RATE: 82 BPM | OXYGEN SATURATION: 98 % | SYSTOLIC BLOOD PRESSURE: 150 MMHG | DIASTOLIC BLOOD PRESSURE: 88 MMHG | BODY MASS INDEX: 32.89 KG/M2 | TEMPERATURE: 96.7 F

## 2021-01-14 DIAGNOSIS — R60.0 BILATERAL LEG EDEMA: ICD-10-CM

## 2021-01-14 DIAGNOSIS — Z79.4 TYPE 2 DIABETES MELLITUS WITH HYPERGLYCEMIA, WITH LONG-TERM CURRENT USE OF INSULIN (HCC): ICD-10-CM

## 2021-01-14 DIAGNOSIS — E11.65 TYPE 2 DIABETES MELLITUS WITH HYPERGLYCEMIA, WITH LONG-TERM CURRENT USE OF INSULIN (HCC): ICD-10-CM

## 2021-01-14 DIAGNOSIS — G47.33 OSA (OBSTRUCTIVE SLEEP APNEA): ICD-10-CM

## 2021-01-14 DIAGNOSIS — R06.01 ORTHOPNEA: ICD-10-CM

## 2021-01-14 DIAGNOSIS — J34.89 NASAL VESTIBULITIS: Primary | ICD-10-CM

## 2021-01-14 PROCEDURE — 3008F BODY MASS INDEX DOCD: CPT | Performed by: INTERNAL MEDICINE

## 2021-01-14 PROCEDURE — 99214 OFFICE O/P EST MOD 30 MIN: CPT | Performed by: FAMILY MEDICINE

## 2021-01-14 RX ORDER — ATORVASTATIN CALCIUM 40 MG/1
40 TABLET, FILM COATED ORAL DAILY
Qty: 90 TABLET | Refills: 2 | Status: SHIPPED | OUTPATIENT
Start: 2021-01-14 | End: 2021-10-08

## 2021-01-14 NOTE — ASSESSMENT & PLAN NOTE
Reports sleeping on multiple pillows and getting poor sleep at night  Admits to prior history of CHANTALE requiring CPAP but has not used in awhile  Will repeat sleep study

## 2021-01-14 NOTE — PROGRESS NOTES
Assessment/Plan:    Hyperlipidemia associated with type 2 diabetes mellitus ([facility] [address] )  Patient previously on simvastatin 10 mg  Given her diabetes and ASCVD risk score of 13 4%, patient changed to high intensity statin atorvastatin 40 mg  Lab Results   Component Value Date    HGBA1C 12 4 (H) 05/26/2020       Uncontrolled type 2 diabetes mellitus with complication, with long-term current use of insulin ([facility] [address] )  Patient followed by endocrine  Will continue to monitor A1c  Lab Results   Component Value Date    HGBA1C 12 4 (H) 05/26/2020       Hypertension  According to EHR, patient has had uncontrolled hypertension during ED and other office visits despite being on multiple medications  Today, patient's blood pressure is better at 150/88  Will not make any changes to patient's medication regimen at this time  Advised patient to keep a log of her blood pressures and bring it to her next office visit  Nasal vestibulitis  Small raised lesion in right nare  No signs of erythema or swelling and no palpable fluctuance on face  Muropuricin rx sent to pharmacy  CHANTALE (obstructive sleep apnea)  Reports sleeping on multiple pillows and getting poor sleep at night  Admits to prior history of CHANTALE requiring CPAP but has not used in awhile  Will repeat sleep study  Orthopnea  Given hx and risk factors, concern for CHF/ CAD/ CHANTALE  Patient reports not being able to lay flat, sleeping on 4 pillows at night, and bilateral leg swelling  Will get stress ECHO to evaluate heart function  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, will opt for pharmacologic stress ECHO  Problem List Items Addressed This Visit        Respiratory    Nasal vestibulitis - Primary     Small raised lesion in right nare  No signs of erythema or swelling and no palpable fluctuance on face  Muropuricin rx sent to pharmacy            Relevant Medications    mupirocin (BACTROBAN) 2 % nasal ointment    CHANTALE (obstructive sleep apnea)     Reports sleeping on multiple pillows and getting poor sleep at night  Admits to prior history of CHANTALE requiring CPAP but has not used in awhile  Will repeat sleep study  Relevant Orders    Ambulatory referral to Sleep Medicine       Other    Orthopnea     Given hx and risk factors, concern for CHF/ CAD/ CHANTALE  Patient reports not being able to lay flat, sleeping on 4 pillows at night, and bilateral leg swelling  Will get stress ECHO to evaluate heart function  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, will opt for pharmacologic stress ECHO  Relevant Orders    Echo stress test w contrast if indicated      Other Visit Diagnoses     Bilateral leg edema        Relevant Orders    Echo stress test w contrast if indicated    Type 2 diabetes mellitus with hyperglycemia, with long-term current use of insulin (HCC)        Relevant Medications    atorvastatin (LIPITOR) 40 mg tablet            Subjective:      Patient ID: Ulices Starkey is a 48 y o  female  49 yo F presenting to establish care after her old PCP no longer takes her insurance  She has an acute complaint of what she believes is an abscess in her right nare that she first noticed yesterday  She has a history of abscesses in her nares prior for which ENT has prescribed her bacitracin with good relief  She states that the abscess is causing her face to be painful and tender but denies any fevers, drainage  Upon question, patient admitted to orthopnea that she has had for years and bilateral leg edema which started a couple days ago  Patient denies any chest pain, chest pressure, exertional symptoms  Patient has a history of diabetes, managed by endocrine and chronic low back pain, managed by pain management         The following portions of the patient's history were reviewed and updated as appropriate: allergies, current medications, past family history, past medical history, past social history, past surgical history and problem list     Review of Systems Constitutional: Negative for chills and fever  HENT: Positive for sinus pain  Negative for dental problem and ear discharge  Respiratory: Positive for shortness of breath  Cardiovascular: Positive for leg swelling  Negative for chest pain and palpitations  Gastrointestinal: Negative for abdominal pain, constipation, diarrhea and nausea  Genitourinary: Negative for dysuria  Musculoskeletal: Positive for back pain and gait problem  Neurological: Negative for dizziness, light-headedness and headaches  All other systems reviewed and are negative  Objective:      /88   Pulse 82   Temp (!) 96 7 °F (35 9 °C)   Ht 5' 8" (1 727 m)   Wt 98 4 kg (217 lb)   SpO2 98%   BMI 32 99 kg/m²          Physical Exam  Vitals signs reviewed  Constitutional:       General: She is not in acute distress  Appearance: She is obese  HENT:      Head: Normocephalic and atraumatic  Right Ear: External ear normal       Left Ear: External ear normal       Nose:      Comments: Raised, erythematous lesion on lateral aspect of right nare  No facial swelling, palpable fluctuance  Mouth/Throat:      Mouth: Mucous membranes are moist       Pharynx: Oropharynx is clear  No posterior oropharyngeal erythema  Eyes:      Extraocular Movements: Extraocular movements intact  Conjunctiva/sclera: Conjunctivae normal       Pupils: Pupils are equal, round, and reactive to light  Neck:      Musculoskeletal: Normal range of motion and neck supple  Cardiovascular:      Rate and Rhythm: Normal rate and regular rhythm  Pulses: Normal pulses  Heart sounds: Murmur present  Pulmonary:      Effort: Pulmonary effort is normal  No respiratory distress  Breath sounds: Normal breath sounds  Abdominal:      General: Bowel sounds are normal       Palpations: Abdomen is soft  Tenderness: There is no abdominal tenderness  Musculoskeletal: Normal range of motion        Right lower leg: Edema (2+ pitting edema) present  Left lower leg: Edema (2+ pitting edema) present  Skin:     General: Skin is warm  Neurological:      General: No focal deficit present  Mental Status: She is alert and oriented to person, place, and time

## 2021-01-14 NOTE — ASSESSMENT & PLAN NOTE
Patient followed by endocrine  Will continue to monitor A1c     Lab Results   Component Value Date    HGBA1C 12 4 (H) 05/26/2020

## 2021-01-14 NOTE — ASSESSMENT & PLAN NOTE
Patient previously on simvastatin 10 mg  Given her diabetes and ASCVD risk score of 13 4%, patient changed to high intensity statin atorvastatin 40 mg     Lab Results   Component Value Date    HGBA1C 12 4 (H) 05/26/2020

## 2021-01-14 NOTE — ASSESSMENT & PLAN NOTE
Given hx and risk factors, concern for CHF/ CAD/ CHANTALE  Patient reports not being able to lay flat, sleeping on 4 pillows at night, and bilateral leg swelling  Will get stress ECHO to evaluate heart function  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, will opt for pharmacologic stress ECHO

## 2021-01-14 NOTE — ASSESSMENT & PLAN NOTE
Small raised lesion in right nare  No signs of erythema or swelling and no palpable fluctuance on face  Muropuricin rx sent to pharmacy

## 2021-01-14 NOTE — ASSESSMENT & PLAN NOTE
According to EHR, patient has had uncontrolled hypertension during ED and other office visits despite being on multiple medications  Today, patient's blood pressure is better at 150/88  Will not make any changes to patient's medication regimen at this time  Advised patient to keep a log of her blood pressures and bring it to her next office visit  From: Collette Peng  Sent: 5/8/2018 9:28 AM CDT  Subject: E-Visit Submission: Urinary Problems    E-Visit Submission: Urinary Problems  --------------------------------    Question: Which of the following symptoms are you experiencing: painful urination, difficult urination, change in urine appearance or smell?  Answer: Pain while passing urine    Question: If you have pain when passing urine, which of these apply: burning sensation, pain on inside, near on or outside, or none of the above?  Answer: The pain feels like it is on the inside    Question: Are you able to pass urine?  Answer: Yes, I can pass urine.    Question: How long have you had pain or difficulty passing urine?  Answer: More than two days but less than one week    Question: Do you have a fever?  Answer: No, I do not have a fever    Question: Do you have any of the following symptoms: back pain, vomiting, diarrhea, shaking chills, or none of the above?   Answer: None of the above    Question: Do you have belly pain with this illness: pressure below the belly button, pain across the lower belly, widespread belly pain, shaking chills, or none of the above?  Answer: I have pressure below the belly button with this illness    Question: Do you have a sense of urgency when you need to pass urine?  Answer: Yes, I have urgency    Question: What does your urine look like?  Answer: It is cloudy    Question: Do you have any of the following: blood, dark red or dark brown urine, an unusual smell, or none of the above?  Answer: None of the above    Question: Do you have any unusual discharge from the vagina?  Answer: No    Question: Do you have any sores on your genitals?  Answer: No    Question: Have you had any kidney problems in the past?  Answer: Yes    Question: Please give some additional detail on your past kidney problems:  Answer: I have had a kidney infection in the pass     Question: Within the past 3 months, have you had any surgery on your kidneys  or bladder, or have you had a tube inserted to collect your urine?  Answer: No, I have never had either    Question: Have you had similar symptoms in the past?  Answer: Yes, I have had similar symptoms more than once before    Question: If you had similar symptoms in the past, did any of the following work: antibiotics, cranberry juice, pills for yeast, cream for yeast, don't remember, or other?  Answer: Antibiotics    Cranberry Juice    Question: Have you taken antibiotics in the last 30 days?  Answer: I have not been on antibiotics    Question: Do you have any chronic illnesses such as diabetes, heart disease, lung disease, or any illness that would weaken your ability to fight infection?  Answer: No    Question: Do you take any medications that can suppress the immune system (chemotherapy, steroids, transplant antirejection medications)?   Answer: No    Question: Are you pregnant?  Answer: I am confident that I am not pregnant    Question: Are you breast-feeding?  Answer: No    Question: Do you need a work/school excuse letter?  Answer: No    Question: Is there anything else you would like to add?   Answer: I have been using the orange Azo pills that help a little bit with the pain when urinating     Question: Thank you for completing this questionnaire. One or more of your responses may indicate that a face-to-face evaluation of these symptoms with a physician is warranted. If that is necessary, what is the preferred telephone number that we can use to contact you?  Answer: [phone]

## 2021-01-29 DIAGNOSIS — E11.65 TYPE 2 DIABETES MELLITUS WITH HYPERGLYCEMIA, WITH LONG-TERM CURRENT USE OF INSULIN (HCC): ICD-10-CM

## 2021-01-29 DIAGNOSIS — IMO0002 UNCONTROLLED TYPE 2 DIABETES MELLITUS WITH COMPLICATION, WITH LONG-TERM CURRENT USE OF INSULIN: ICD-10-CM

## 2021-01-29 DIAGNOSIS — Z79.4 TYPE 2 DIABETES MELLITUS WITH HYPERGLYCEMIA, WITH LONG-TERM CURRENT USE OF INSULIN (HCC): ICD-10-CM

## 2021-01-29 RX ORDER — DULAGLUTIDE 1.5 MG/.5ML
INJECTION, SOLUTION SUBCUTANEOUS
Qty: 6 ML | Refills: 1 | Status: SHIPPED | OUTPATIENT
Start: 2021-01-29 | End: 2021-03-12

## 2021-01-29 RX ORDER — ALPRAZOLAM 0.5 MG/1
TABLET ORAL
Qty: 90 TABLET | Refills: 1 | Status: SHIPPED | OUTPATIENT
Start: 2021-01-29 | End: 2021-11-23 | Stop reason: HOSPADM

## 2021-01-29 RX ORDER — INSULIN GLARGINE 100 [IU]/ML
INJECTION, SOLUTION SUBCUTANEOUS
Qty: 15 ML | Refills: 3 | Status: SHIPPED | OUTPATIENT
Start: 2021-01-29 | End: 2021-04-16 | Stop reason: ALTCHOICE

## 2021-01-29 RX ORDER — ZOLPIDEM TARTRATE 10 MG/1
10 TABLET ORAL
Qty: 90 TABLET | Refills: 0 | Status: SHIPPED | OUTPATIENT
Start: 2021-01-29 | End: 2021-11-23 | Stop reason: HOSPADM

## 2021-01-29 RX ORDER — PREGABALIN 75 MG/1
CAPSULE ORAL
Qty: 90 CAPSULE | Refills: 1 | Status: ON HOLD | OUTPATIENT
Start: 2021-01-29

## 2021-02-08 ENCOUNTER — TELEPHONE (OUTPATIENT)
Dept: ENDOCRINOLOGY | Facility: CLINIC | Age: 56
End: 2021-02-08

## 2021-02-08 DIAGNOSIS — IMO0002 UNCONTROLLED TYPE 2 DIABETES MELLITUS WITH COMPLICATION, WITH LONG-TERM CURRENT USE OF INSULIN: ICD-10-CM

## 2021-02-08 NOTE — PROGRESS NOTES
humalog refilled for V-go pump    - I spoke with pt she is comfortable with V-go pump, works well for her  I ask pt to call office and schedule follow up appointment with me in the middle of March     - have labs before appointment

## 2021-02-08 NOTE — TELEPHONE ENCOUNTER
DrugV-Go 40 kit   [address] Prior Authorization Form [id number] [id number]  Submitted through covermymeds     Key: [id number]

## 2021-02-08 NOTE — TELEPHONE ENCOUNTER
Pt called today requesting a new refill for  Her Humalog, Pt is using 10 ML every 2 weeks  Pt is new to her pump and she has not done diabetes education and she declined , she mentioned that her Pharmacist tough her how to use her V-go    Please advise

## 2021-02-09 ENCOUNTER — OFFICE VISIT (OUTPATIENT)
Dept: SLEEP CENTER | Facility: CLINIC | Age: 56
End: 2021-02-09
Payer: MEDICARE

## 2021-02-09 VITALS
HEART RATE: 73 BPM | WEIGHT: 217 LBS | SYSTOLIC BLOOD PRESSURE: 156 MMHG | BODY MASS INDEX: 32.89 KG/M2 | HEIGHT: 68 IN | DIASTOLIC BLOOD PRESSURE: 100 MMHG

## 2021-02-09 DIAGNOSIS — G47.33 OSA (OBSTRUCTIVE SLEEP APNEA): ICD-10-CM

## 2021-02-09 PROCEDURE — 99203 OFFICE O/P NEW LOW 30 MIN: CPT | Performed by: INTERNAL MEDICINE

## 2021-02-09 NOTE — PROGRESS NOTES
Consultation - Sujata : 1965  MRN: [id number]      Assessment:  The patient likely has continued obstructive sleep apnea  She has has some weight loss and weight gain over the years, but her symptoms are extremely similar to those, when she first presented for treatment  Plan:  Diagnostic polysomnography followed by APAP or positive airway pressure titration as appropriate  Follow up:  Compliance check    History of Present Illness:   48 y  o female with prior history of obstructive sleep apnea  The patient lost her equipment when she lost her insurance  She continues to experience excessive daytime sleepiness, frequent nocturnal arousals and nocturnal GERD  She has a history of snoring and apneas  She also complains of insomnia for which she has used Ambien in the past   She is an obligate mouth breather and feels that her previous mask was not effective for that reason        Review of Systems      Genitourinary need to urinate more than twice a night   Cardiology palpitations/fluttering feeling in the chest and ankle/leg swelling   Gastrointestinal frequent heartburn/acid reflux   Neurology need to move extremities, muscle weakness, numbness/tingling of an extremity, loss of consciousness, poor concentration or confusion, , difficulty with memory and balance problems   Constitutional claustrophobia and weight change   Integumentary rash or dry skin   Psychiatry anxiety and depression   Musculoskeletal joint pain, muscle aches, back pain, sciatica and leg cramps   Pulmonary shortness of breath with activity, frequent cough and difficulty breathing when lying flat    ENT throat clearing and ringing in ears   Endocrine excessive thirst and frequent urination   Hematological none         I have reviewed and updated the review of systems as necessary    Historical Information    Past Medical History:  GERD, diabetes, hypertension    Family History: non-contributory    Social History     Socioeconomic History    Marital status: Legally      Spouse name: None    Number of children: None    Years of education: None    Highest education level: None   Occupational History    None   Social Needs    Financial resource strain: None    Food insecurity     Worry: None     Inability: None    Transportation needs     Medical: None     Non-medical: None   Tobacco Use    Smoking status: Current Every Day Smoker     Packs/day: 1 00     Years: 30 00     Pack years: 30 00     Types: Cigarettes    Smokeless tobacco: Never Used   Substance and Sexual Activity    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   Drug use: Not Currently     Types: Cocaine    Sexual activity: Yes     Birth control/protection: Female Sterilization     Comment: Monogamous relationship with monogamous partner   Lifestyle    Physical activity     Days per week: None     Minutes per session: None    Stress: None   Relationships    Social connections     Talks on phone: None     Gets together: None     Attends Buddhist service: None     Active member of club or organization: None     Attends meetings of clubs or organizations: None     Relationship status: None    Intimate partner violence     Fear of current or ex partner: None     Emotionally abused: None     Physically abused: None     Forced sexual activity: None   Other Topics Concern    None   Social History Narrative    Most recent tobacco use screenin2019    Do you currently or have you served in the [employer]: No    Were you activated, into active duty, as a member of the [employer] or as a Reservist: No    Advance directive: No    Chewing tobacco: none    Alcohol intake: None    Marital status: Single    Live alone or with others: with others    Family history of heart disease:    aortic aneurysm    High cholesterol: Yes    High blood pressure: Yes    Exercise level: Heavy    Overweight: Yes    Obese:  Yes Diabetes: Yes    General stress level: High    Diet: Regular    Caffeine intake: Occasional    Guns present in home: No    Seat belts used routinely: Yes    Sexual orientation: Heterosexual    Sunscreen used routinely: No    Seat belt/car seat used routinely: Yes    Exercise-How often do you exercise: as tolerated    Do you have regular medical check ups: Yes    Do you have regular dental check ups: Yes    Illicit drugs-years of use:    recovery 23 years - HX of         Sleep Schedule: unremarkable    Snoring:  Yes    Witnessed Apnea:  Yes    Medications/Allergies:    Current Outpatient Medications:     ADMELOG SOLOSTAR 100 units/mL injection pen, INJECT 20 UNIT 3 TIMES A DAY BY SUBCUTANEOUS ROUTE (Patient taking differently: Inject 10 Units under the skin 3 (three) times a day with meals ), Disp: 15 mL, Rfl: 5    ALPRAZolam (XANAX) 0 5 mg tablet, "ONE-HALF" TABLET TWO TIMES A DAY, Disp: 90 tablet, Rfl: 1    amLODIPine (NORVASC) 10 mg tablet, Take 10 mg by mouth daily, Disp: , Rfl:     atorvastatin (LIPITOR) 40 mg tablet, Take 1 tablet (40 mg total) by mouth daily, Disp: 90 tablet, Rfl: 2    bacitracin topical ointment 500 units/g topical ointment, Apply to bilateral nares twice per day , Disp: 15 g, Rfl: 3    ciprofloxacin-dexamethasone (CIPRODEX) otic suspension, Administer 4 drops to the right ear 2 (two) times a day, Disp: 7 5 mL, Rfl: 0    cloNIDine (CATAPRES-TTS-3) 0 3 mg/24 hr, APPLY 1 PATCH EVERY WEEK BY TRANSDERMAL, Disp: 4 patch, Rfl: 3    [employer] IRON 240 (27 Fe) MG tablet, TAKE 1 TABLET (240 MG TOTAL) BY MOUTH 3 (THREE) TIMES A DAY WITH MEALS, Disp: 90 tablet, Rfl: 1    cyclobenzaprine (FLEXERIL) 10 mg tablet, Take 1 tablet (10 mg total) by mouth 3 (three) times a day as needed for muscle spasms for up to 10 days, Disp: 30 tablet, Rfl: 0    dexamethasone (DECADRON) 1 mg tablet, Take 1 tablet (1 mg total) by mouth 2 (two) times a day with meals Take tablet between 10-11pm and then have lab next day in the morning 7-9am , Disp: 1 tablet, Rfl: 0    diclofenac (VOLTAREN) 75 mg EC tablet, Take 1 tablet (75 mg total) by mouth 2 (two) times a day, Disp: 60 tablet, Rfl: 0    doxazosin (CARDURA) 4 mg tablet, Take 4 mg by mouth daily, Disp: , Rfl:     ergocalciferol (VITAMIN D2) 50,000 units, Take 1 capsule (50,000 Units total) by mouth 2 (two) times a week, Disp: 24 capsule, Rfl: 1    fenofibrate (TRICOR) 145 mg tablet, TAKE ONE TABLET EVERY DAY, Disp: 90 tablet, Rfl: 6    hydrALAZINE (APRESOLINE) 50 mg tablet, TAKE TWO TABLETS (100MG) TWO TIMES A DAY, Disp: 120 tablet, Rfl: 3    hydrochlorothiazide (HYDRODIURIL) 25 mg tablet, TAKE ONE TABLET EVERY DAY, Disp: 90 tablet, Rfl: 1    ibuprofen (MOTRIN) 800 mg tablet, Take 1 tablet (800 mg total) by mouth every 6 (six) hours as needed for moderate pain, Disp: 30 tablet, Rfl: 0    Insulin Disposable Pump (V-Go 40) KIT, Use daily, Disp: 30 kit, Rfl: 3    insulin glargine (Basaglar KwikPen) 100 units/mL injection pen, 80 UNITS AT BEDTIME SUBCUTANEOUSLY, Disp: 15 mL, Rfl: 3    insulin lispro (HumaLOG) 100 units/mL injection, Insulin for pump, Disp: 10 mL, Rfl: 3    Insulin Pen Needle (UNIFINE PENTIPS PLUS) 31G X 6 MM MISC, 1 Device by Does not apply route 4 (four) times a day, Disp: , Rfl:     Lancets MISC, 1 Device by Does not apply route 4 (four) times a day, Disp: , Rfl:     lidocaine (LIDODERM) 5 %, Apply 1 patch topically daily Remove & Discard patch within 12 hours or as directed by MD, Disp: 30 patch, Rfl: 0    lisinopril (ZESTRIL) 40 mg tablet, TAKE ONE TABLET TWO TIMES A DAY (Patient taking differently: 40 mg daily ), Disp: 180 tablet, Rfl: 1    Magnesium 400 MG TABS, Take by mouth, Disp: , Rfl:     meclizine (ANTIVERT) 25 mg tablet, Take 1 tablet (25 mg total) by mouth every 8 (eight) hours as needed for dizziness, Disp: 30 tablet, Rfl: 0    metFORMIN (GLUCOPHAGE) 1000 MG tablet, TAKE 1 TABLET TWICE A DAY BY ORAL ROUTE, Disp: 60 tablet, Rfl: 3   metoprolol succinate (TOPROL-XL) 200 MG 24 hr tablet, TAKE 1 TABLET EVERY DAY BY ORAL ROUTE, Disp: 30 tablet, Rfl: 3    mupirocin (BACTROBAN) 2 % nasal ointment, into each nostril 2 (two) times a day, Disp: 10 g, Rfl: 0    pantoprazole (PROTONIX) 20 mg tablet, Take 2 tablets (40 mg total) by mouth daily, Disp: 30 tablet, Rfl: 3    pioglitazone (ACTOS) 30 mg tablet, Take 1 tablet (30 mg total) by mouth daily, Disp: 90 tablet, Rfl: 1    pregabalin (LYRICA) 75 mg capsule, TAKE ONE CAPSULE EVERY DAY, Disp: 90 capsule, Rfl: 1    spironolactone (ALDACTONE) 25 mg tablet, Take 25 mg by mouth daily , Disp: , Rfl:     TRELEGY ELLIPTA 100-62 5-25 MCG/INH inhaler, ONE PUFF EVERY DAY, Disp: 60 each, Rfl: 1    Trulicity 1 5 ME/9 6FW SOPN, INJECT 0 5 ML EVERY WEEK BY SUBCUTANEOUS ROUTE , Disp: 6 mL, Rfl: 1    zolpidem (AMBIEN) 10 mg tablet, TAKE 1 TABLET (10 MG TOTAL) BY MOUTH DAILY AT BEDTIME AS NEEDED FOR SLEEP, Disp: 90 tablet, Rfl: 0        No notes on file                  Objective:    Vital Signs:   Vitals:    02/09/21 1000   BP: 156/100   Pulse: 73   Weight: 98 4 kg (217 lb)   Height: 5' 8" (1 727 m)     Neck Circumference: 15 5      [place] Sleepiness Scale: Total score: 7    Physical Exam:    General: Alert, appropriate, cooperative, overweight    Head: NC/AT, mild retrognathia    Nose: No septal deviation    Throat: Airway diminished, tongue base thickened, no tonsils visualized    Neck: Normal, no thyromegaly or lymphadenopathy, no JVD    Heart: RR, normal S1 and S2, no murmurs    Chest: Clear bilaterally    Extremity: No clubbing, cyanosis, no edema    Skin: Warm, dry    Neuro: No motor abnormalities, cranial nerves appear intact    Sleep Study Results:   Unavailable      Counseling / Coordination of Care  A description of the counseling / coordination of care: The patient feels that she has good knowledge of obstructive sleep apnea  All questions were answered              Board Certified Sleep Specialist    Portions of the record may have been created with voice recognition software  Occasional wrong word or "sound a like" substitutions may have occurred due to the inherent limitations of voice recognition software  Read the chart carefully and recognize, using context, where substitutions have occurred

## 2021-02-10 DIAGNOSIS — IMO0002 UNCONTROLLED TYPE 2 DIABETES MELLITUS WITH COMPLICATION, WITH LONG-TERM CURRENT USE OF INSULIN: ICD-10-CM

## 2021-02-10 NOTE — TELEPHONE ENCOUNTER
Script need specific direction or days supply for insurance  Review rx and sent it to pharmacy  You spoke with pt yesterday and im not sure if you change he dosage for her Humalog  Thanks

## 2021-02-12 ENCOUNTER — HOSPITAL ENCOUNTER (OUTPATIENT)
Dept: NON INVASIVE DIAGNOSTICS | Facility: HOSPITAL | Age: 56
Discharge: HOME | End: 2021-02-12
Attending: FAMILY MEDICINE
Payer: MEDICARE

## 2021-02-12 VITALS — WEIGHT: 217 LBS | BODY MASS INDEX: 32.99 KG/M2

## 2021-02-12 DIAGNOSIS — R60.0 BILATERAL LEG EDEMA: ICD-10-CM

## 2021-02-12 DIAGNOSIS — R06.01 ORTHOPNEA: ICD-10-CM

## 2021-02-12 LAB
CHEST PAIN STATEMENT: NORMAL
MAX DIASTOLIC BP: 80 MMHG
MAX HEART RATE: 144 BPM
MAX PREDICTED HEART RATE: 166 BPM
MAX. SYSTOLIC BP: 220 MMHG
PROTOCOL NAME: NORMAL
REASON FOR TERMINATION: NORMAL
TARGET HR FORMULA: NORMAL
TEST INDICATION: NORMAL
TIME IN EXERCISE PHASE: NORMAL

## 2021-02-12 PROCEDURE — 93350 STRESS TTE ONLY: CPT

## 2021-02-12 PROCEDURE — 93351 STRESS TTE COMPLETE: CPT | Performed by: INTERNAL MEDICINE

## 2021-02-12 RX ORDER — DOBUTAMINE HYDROCHLORIDE 200 MG/100ML
5 INJECTION INTRAVENOUS CONTINUOUS
Status: DISCONTINUED | OUTPATIENT
Start: 2021-02-12 | End: 2021-02-13 | Stop reason: HOSPADM

## 2021-02-12 RX ORDER — METOPROLOL TARTRATE 5 MG/5ML
2.5 INJECTION INTRAVENOUS ONCE
Status: COMPLETED | OUTPATIENT
Start: 2021-02-12 | End: 2021-02-12

## 2021-02-12 RX ADMIN — DOBUTAMINE HYDROCHLORIDE 5 MCG/KG/MIN: 200 INJECTION INTRAVENOUS at 08:38

## 2021-02-12 RX ADMIN — METOROPROLOL TARTRATE 2.5 MG: 5 INJECTION, SOLUTION INTRAVENOUS at 08:55

## 2021-02-15 ENCOUNTER — OFFICE VISIT (OUTPATIENT)
Dept: FAMILY MEDICINE CLINIC | Facility: CLINIC | Age: 56
End: 2021-02-15
Payer: MEDICARE

## 2021-02-15 VITALS
OXYGEN SATURATION: 98 % | TEMPERATURE: 95.4 F | DIASTOLIC BLOOD PRESSURE: 90 MMHG | HEART RATE: 96 BPM | SYSTOLIC BLOOD PRESSURE: 160 MMHG | BODY MASS INDEX: 31.9 KG/M2 | RESPIRATION RATE: 20 BRPM | HEIGHT: 69 IN | WEIGHT: 215.4 LBS

## 2021-02-15 DIAGNOSIS — F33.9 DEPRESSION, RECURRENT (HCC): ICD-10-CM

## 2021-02-15 DIAGNOSIS — Z12.31 VISIT FOR SCREENING MAMMOGRAM: Primary | ICD-10-CM

## 2021-02-15 PROCEDURE — G0402 INITIAL PREVENTIVE EXAM: HCPCS | Performed by: FAMILY MEDICINE

## 2021-02-15 NOTE — PATIENT INSTRUCTIONS
Please look over the 5 Wishes and bring it back so we can complete it together to name your son as your POA

## 2021-02-15 NOTE — PROGRESS NOTES
Kenny Michele is here for her Welcome to Medicare visit  Last Medicare Wellness visit information reviewed, patient interviewed, no change since last AWV  Health Risk Assessment:   Patient rates overall health as good  Patient feels that their physical health rating is same  Eyesight was rated as slightly worse  Hearing was rated as same  Patient feels that their emotional and mental health rating is same  Pain experienced in the last 7 days has been a lot  Patient's pain rating has been 8/10  Patient states that she has experienced no weight loss or gain in last 6 months  Depression Screening:   PHQ-2 Score: 5  PHQ-9 Score: 19      Fall Risk Screening: In the past year, patient has experienced: no history of falling in past year      Urinary Incontinence Screening:   Patient has not leaked urine accidently in the last six months  Home Safety:  Patient has trouble with stairs inside or outside of their home  Patient has working smoke alarms and has no working carbon monoxide detector  Home safety hazards include: not having non-slip bath and/or shower mats  Nutrition:   Current diet is Limited junk food and Diabetic  Not very adherent to diabetic diet    Medications:   Patient is not currently taking any over-the-counter supplements  Patient is able to manage medications  Activities of Daily Living (ADLs)/Instrumental Activities of Daily Living (IADLs):   Walk and transfer into and out of bed and chair?: Yes  Dress and groom yourself?: Yes    Bathe or shower yourself?: Yes    Feed yourself? Yes  Do your laundry/housekeeping?: Yes  Manage your money, pay your bills and track your expenses?: Yes  Make your own meals?: Yes    Do your own shopping?: Yes    Previous Hospitalizations:   Any hospitalizations or ED visits within the last 12 months?: Yes      Hospitalization Comments: ED visits, no hospitalizations since 2018    Advance Care Planning:   Living will: No    Durable POA for healthcare:  No Advanced directive: No    Advanced directive counseling given: Yes    Five wishes given: Yes      Comments: Wants son to be POA, but she is still technically  to her  who lives in [place] but they are not together and she does not want her  to be her POA  Understands that in [place] the spouse is the default POA, so advised patient to generate documentation if she wants son to be POA  Five Wishes given today  Cognitive Screening:   Provider or family/friend/caregiver concerned regarding cognition?: No    PREVENTIVE SCREENINGS      Cardiovascular Screening:    General: Screening Current      Diabetes Screening:     General: Screening Not Indicated and History Diabetes      Colorectal Cancer Screening:     General: Screening Current      Breast Cancer Screening:     General: Risks and Benefits Discussed    Due for: Mammogram        Cervical Cancer Screening:    General: Screening Current      Osteoporosis Screening:    General: Screening Not Indicated      Abdominal Aortic Aneurysm (AAA) Screening:        General: Screening Not Indicated      Lung Cancer Screening:     General: Screening Not Indicated      Hepatitis C Screening:    General: Patient Declines      Preventive Screening Comments: Will start with lung cancer screenings at age 55    Other Counseling Topics:   Skin self-exam, sunscreen and calcium and vitamin D intake

## 2021-02-19 ENCOUNTER — HOSPITAL ENCOUNTER (OUTPATIENT)
Dept: SLEEP CENTER | Facility: CLINIC | Age: 56
Discharge: HOME | End: 2021-02-19
Payer: MEDICARE

## 2021-02-19 DIAGNOSIS — G47.33 OSA (OBSTRUCTIVE SLEEP APNEA): ICD-10-CM

## 2021-02-19 PROCEDURE — 95810 POLYSOM 6/> YRS 4/> PARAM: CPT

## 2021-02-20 NOTE — PROGRESS NOTES
Sleep Study Documentation    Pre-Sleep Study       Sleep testing procedure explained to patient:YES    Patient napped prior to study:YES- less than 30 minutes  Napped after 2PM: yes    Caffeine:Dayshift worker after 12PM   Caffeine use:YES- coffee  6 to 18 ounces and ice tea  12 ounces    Alcohol:Dayshift workers after 5PM: Alcohol use:NO    Typical day for patient:YES       Study Documentation    Sleep Study Indications: Snoring, EDS    Sleep Study: Diagnostic   Snore: Moderate  Supplemental O2: no    O2 flow rate (L/min) range   O2 flow rate (L/min) final   Minimum SaO2 78%  Baseline SaO2 93%        Mode of Therapy:    EKG abnormalities: no     EEG abnormalities: no    Sleep Study Recorded < 2 hours: N/A    Sleep Study Recorded > 2 hours but incomplete study: N/A    Sleep Study Recorded 6 hours but no sleep obtained: NO    Patient classification: employed       Post-Sleep Study    Medication used at bedtime or during sleep study:NO    Patient reports time it took to fall asleep:greater than 60 minutes    Patient reports waking up during study:1 to 2 times  Patient reports returning to sleep in 10 to 30 minutes  Patient reports sleeping 2 to 4 hours without dreaming  Patient reports sleep during study:typical    Patient rated sleepiness: Somewhat sleepy or tired    PAP treatment:no

## 2021-02-22 DIAGNOSIS — G47.33 OSA (OBSTRUCTIVE SLEEP APNEA): Primary | ICD-10-CM

## 2021-02-23 ENCOUNTER — TELEPHONE (OUTPATIENT)
Dept: SLEEP CENTER | Facility: CLINIC | Age: 56
End: 2021-02-23

## 2021-02-23 DIAGNOSIS — Z20.822 ENCOUNTER FOR PREPROCEDURE SCREENING LABORATORY TESTING FOR COVID-19: Primary | ICD-10-CM

## 2021-02-23 DIAGNOSIS — Z01.812 ENCOUNTER FOR PREPROCEDURE SCREENING LABORATORY TESTING FOR COVID-19: Primary | ICD-10-CM

## 2021-02-23 NOTE — TELEPHONE ENCOUNTER
Left message for patient to call office for sleep study results  Mild CHANTALE and hypoxia  CPAP study ordered  Patient has follow up with Dr Adriana Mack scheduled 6/22/21

## 2021-02-23 NOTE — TELEPHONE ENCOUNTER
Reviewed sleep study results with patient  CPAP study ordered  Scheduled CPAP study for 5/3/21 in [place]  Instructions given  Verbalized understanding  Discussed COVID protocol:  Patient agreeable to have COVID test on 4/23/21, for PAP study on 5/3/21  We ask that you limit interpersonal interactions as much as possible and observe all social distancing and masking precautions from the time of your testing to the time of your study  COVID order placed  COVID letter sent

## 2021-02-24 ENCOUNTER — TELEPHONE (OUTPATIENT)
Dept: SLEEP CENTER | Facility: CLINIC | Age: 56
End: 2021-02-24

## 2021-02-24 NOTE — TELEPHONE ENCOUNTER
2/24 Patient informed that COVID testing is to be performed 10 days prior to PAP study  Patient is to tell site that it is needed for a procedure so it is expedited  Testing site information provided  -[place]

## 2021-02-26 ENCOUNTER — TELEPHONE (OUTPATIENT)
Dept: ENDOCRINOLOGY | Facility: CLINIC | Age: 56
End: 2021-02-26

## 2021-03-03 DIAGNOSIS — IMO0002 UNCONTROLLED TYPE 2 DIABETES MELLITUS WITH COMPLICATION, WITH LONG-TERM CURRENT USE OF INSULIN: Primary | ICD-10-CM

## 2021-03-03 NOTE — TELEPHONE ENCOUNTER
Patient called back and stated that she has not received her V-go 40 kit yet, I submitted P A  through covermymeds  Key: [id number] - [place] Case ID: [id number]

## 2021-03-08 ENCOUNTER — HOSPITAL ENCOUNTER (EMERGENCY)
Facility: HOSPITAL | Age: 56
Discharge: HOME | End: 2021-03-08
Attending: EMERGENCY MEDICINE | Admitting: EMERGENCY MEDICINE
Payer: MEDICARE

## 2021-03-08 VITALS
RESPIRATION RATE: 18 BRPM | DIASTOLIC BLOOD PRESSURE: 92 MMHG | BODY MASS INDEX: 31.07 KG/M2 | TEMPERATURE: 98.2 F | SYSTOLIC BLOOD PRESSURE: 172 MMHG | HEIGHT: 70 IN | OXYGEN SATURATION: 99 % | WEIGHT: 217 LBS | HEART RATE: 78 BPM

## 2021-03-08 DIAGNOSIS — I10 HYPERTENSION: ICD-10-CM

## 2021-03-08 DIAGNOSIS — H92.02 OTALGIA OF LEFT EAR: ICD-10-CM

## 2021-03-08 DIAGNOSIS — K02.9 DENTAL CARIES: Primary | ICD-10-CM

## 2021-03-08 DIAGNOSIS — K04.7 DENTAL INFECTION: ICD-10-CM

## 2021-03-08 LAB
ANION GAP SERPL CALCULATED.3IONS-SCNC: 10 MMOL/L (ref 4–13)
BASOPHILS # BLD AUTO: 0.05 THOUSANDS/ΜL (ref 0–0.1)
BASOPHILS NFR BLD AUTO: 1 % (ref 0–1)
BUN SERPL-MCNC: 13 MG/DL (ref 6–20)
CALCIUM SERPL-MCNC: 8.9 MG/DL (ref 8.4–10.2)
CHLORIDE SERPL-SCNC: 99 MMOL/L (ref 96–108)
CO2 SERPL-SCNC: 27 MMOL/L (ref 22–33)
CREAT SERPL-MCNC: 0.72 MG/DL (ref 0.4–1.1)
EOSINOPHIL # BLD AUTO: 0.13 THOUSAND/ΜL (ref 0–0.61)
EOSINOPHIL NFR BLD AUTO: 2 % (ref 0–6)
ERYTHROCYTE [DISTWIDTH] IN BLOOD BY AUTOMATED COUNT: 14.1 % (ref 11.6–15.1)
GFR SERPL CREATININE-BSD FRML MDRD: 110 ML/MIN/1.73SQ M
GLUCOSE SERPL-MCNC: 407 MG/DL (ref 65–140)
HCT VFR BLD AUTO: 45.4 % (ref 34.8–46.1)
HGB BLD-MCNC: 14.8 G/DL (ref 11.5–15.4)
IMM GRANULOCYTES # BLD AUTO: 0.01 THOUSAND/UL (ref 0–0.2)
IMM GRANULOCYTES NFR BLD AUTO: 0 % (ref 0–2)
LYMPHOCYTES # BLD AUTO: 3.22 THOUSANDS/ΜL (ref 0.6–4.47)
LYMPHOCYTES NFR BLD AUTO: 36 % (ref 14–44)
MCH RBC QN AUTO: 27.6 PG (ref 26.8–34.3)
MCHC RBC AUTO-ENTMCNC: 32.6 G/DL (ref 31.4–37.4)
MCV RBC AUTO: 85 FL (ref 82–98)
MONOCYTES # BLD AUTO: 0.64 THOUSAND/ΜL (ref 0.17–1.22)
MONOCYTES NFR BLD AUTO: 7 % (ref 4–12)
NEUTROPHILS # BLD AUTO: 4.91 THOUSANDS/ΜL (ref 1.85–7.62)
NEUTS SEG NFR BLD AUTO: 54 % (ref 43–75)
PLATELET # BLD AUTO: 266 THOUSANDS/UL (ref 149–390)
PMV BLD AUTO: 11.8 FL (ref 8.9–12.7)
POTASSIUM SERPL-SCNC: 3.3 MMOL/L (ref 3.5–5)
RBC # BLD AUTO: 5.36 MILLION/UL (ref 3.81–5.12)
SODIUM SERPL-SCNC: 136 MMOL/L (ref 133–145)
WBC # BLD AUTO: 8.96 THOUSAND/UL (ref 4.31–10.16)

## 2021-03-08 PROCEDURE — 96375 TX/PRO/DX INJ NEW DRUG ADDON: CPT

## 2021-03-08 PROCEDURE — 36415 COLL VENOUS BLD VENIPUNCTURE: CPT | Performed by: EMERGENCY MEDICINE

## 2021-03-08 PROCEDURE — 96374 THER/PROPH/DIAG INJ IV PUSH: CPT

## 2021-03-08 PROCEDURE — 80048 BASIC METABOLIC PNL TOTAL CA: CPT | Performed by: EMERGENCY MEDICINE

## 2021-03-08 PROCEDURE — 99285 EMERGENCY DEPT VISIT HI MDM: CPT | Performed by: EMERGENCY MEDICINE

## 2021-03-08 PROCEDURE — 93005 ELECTROCARDIOGRAM TRACING: CPT

## 2021-03-08 PROCEDURE — 99283 EMERGENCY DEPT VISIT LOW MDM: CPT

## 2021-03-08 PROCEDURE — 85025 COMPLETE CBC W/AUTO DIFF WBC: CPT | Performed by: EMERGENCY MEDICINE

## 2021-03-08 RX ORDER — HYDROMORPHONE HCL/PF 1 MG/ML
0.5 SYRINGE (ML) INJECTION ONCE
Status: COMPLETED | OUTPATIENT
Start: 2021-03-08 | End: 2021-03-08

## 2021-03-08 RX ORDER — AMOXICILLIN 500 MG/1
500 CAPSULE ORAL 3 TIMES DAILY
Qty: 21 CAPSULE | Refills: 0 | Status: SHIPPED | OUTPATIENT
Start: 2021-03-08 | End: 2021-03-15

## 2021-03-08 RX ORDER — OXYCODONE HYDROCHLORIDE AND ACETAMINOPHEN 5; 325 MG/1; MG/1
1 TABLET ORAL EVERY 4 HOURS PRN
Qty: 8 TABLET | Refills: 0 | Status: SHIPPED | OUTPATIENT
Start: 2021-03-08 | End: 2021-06-10

## 2021-03-08 RX ORDER — ONDANSETRON 2 MG/ML
4 INJECTION INTRAMUSCULAR; INTRAVENOUS ONCE
Status: COMPLETED | OUTPATIENT
Start: 2021-03-08 | End: 2021-03-08

## 2021-03-08 RX ORDER — KETOROLAC TROMETHAMINE 30 MG/ML
15 INJECTION, SOLUTION INTRAMUSCULAR; INTRAVENOUS ONCE
Status: COMPLETED | OUTPATIENT
Start: 2021-03-08 | End: 2021-03-08

## 2021-03-08 RX ORDER — AMOXICILLIN 250 MG/1
500 CAPSULE ORAL ONCE
Status: COMPLETED | OUTPATIENT
Start: 2021-03-08 | End: 2021-03-08

## 2021-03-08 RX ADMIN — ONDANSETRON 4 MG: 2 INJECTION INTRAMUSCULAR; INTRAVENOUS at 21:29

## 2021-03-08 RX ADMIN — HYDROMORPHONE HYDROCHLORIDE 0.5 MG: 1 INJECTION, SOLUTION INTRAMUSCULAR; INTRAVENOUS; SUBCUTANEOUS at 21:30

## 2021-03-08 RX ADMIN — AMOXICILLIN 500 MG: 250 CAPSULE ORAL at 21:30

## 2021-03-08 RX ADMIN — KETOROLAC TROMETHAMINE 15 MG: 30 INJECTION, SOLUTION INTRAMUSCULAR at 21:29

## 2021-03-09 NOTE — ED NOTES
Patient resting awaiting test results    States "pain is easing up"     Grecia Garcia, MONE  03/08/21 [id number]

## 2021-03-09 NOTE — ED NOTES
Patient given d/c instructions with verbalizing understanding of such    No further complaints or questions upon leaving the ED     Berenice Childers RN  03/08/21 [id number]

## 2021-03-12 DIAGNOSIS — Z79.4 TYPE 2 DIABETES MELLITUS WITH HYPERGLYCEMIA, WITH LONG-TERM CURRENT USE OF INSULIN (HCC): ICD-10-CM

## 2021-03-12 DIAGNOSIS — IMO0002 UNCONTROLLED TYPE 2 DIABETES MELLITUS WITH COMPLICATION, WITH LONG-TERM CURRENT USE OF INSULIN: ICD-10-CM

## 2021-03-12 DIAGNOSIS — E11.65 TYPE 2 DIABETES MELLITUS WITH HYPERGLYCEMIA, WITH LONG-TERM CURRENT USE OF INSULIN (HCC): ICD-10-CM

## 2021-03-12 RX ORDER — DULAGLUTIDE 1.5 MG/.5ML
INJECTION, SOLUTION SUBCUTANEOUS
Qty: 2 ML | Refills: 1 | Status: SHIPPED | OUTPATIENT
Start: 2021-03-12 | End: 2021-05-06 | Stop reason: ALTCHOICE

## 2021-03-13 LAB
ATRIAL RATE: 86 BPM
P AXIS: 29 DEGREES
PR INTERVAL: 164 MS
QRS AXIS: -53 DEGREES
QRSD INTERVAL: 114 MS
QT INTERVAL: 411 MS
QTC INTERVAL: 492 MS
T WAVE AXIS: -14 DEGREES
VENTRICULAR RATE: 86 BPM

## 2021-03-13 PROCEDURE — 93010 ELECTROCARDIOGRAM REPORT: CPT | Performed by: INTERNAL MEDICINE

## 2021-03-17 LAB — RENIN PLAS-CCNC: 0.54 NG/ML/HR (ref 0.17–5.38)

## 2021-03-18 DIAGNOSIS — IMO0002 UNCONTROLLED TYPE 2 DIABETES MELLITUS WITH COMPLICATION, WITH LONG-TERM CURRENT USE OF INSULIN: ICD-10-CM

## 2021-03-18 DIAGNOSIS — I71.9 AORTIC ANEURYSM WITHOUT RUPTURE, UNSPECIFIED PORTION OF AORTA (HCC): ICD-10-CM

## 2021-03-18 DIAGNOSIS — I10 UNCONTROLLED HYPERTENSION: ICD-10-CM

## 2021-03-19 RX ORDER — CLONIDINE 0.3 MG/24H
PATCH, EXTENDED RELEASE TRANSDERMAL
Qty: 12 PATCH | Refills: 3 | Status: SHIPPED | OUTPATIENT
Start: 2021-03-19 | End: 2021-06-10

## 2021-03-19 RX ORDER — LISINOPRIL 40 MG/1
TABLET ORAL
Qty: 180 TABLET | Refills: 1 | Status: SHIPPED | OUTPATIENT
Start: 2021-03-19 | End: 2021-11-23 | Stop reason: HOSPADM

## 2021-03-19 RX ORDER — METOPROLOL SUCCINATE 200 MG/1
TABLET, EXTENDED RELEASE ORAL
Qty: 50 TABLET | Refills: 3 | Status: SHIPPED | OUTPATIENT
Start: 2021-03-19 | End: 2021-11-23 | Stop reason: HOSPADM

## 2021-03-19 RX ORDER — HYDRALAZINE HYDROCHLORIDE 50 MG/1
TABLET, FILM COATED ORAL
Qty: 200 TABLET | Refills: 3 | Status: SHIPPED | OUTPATIENT
Start: 2021-03-19 | End: 2021-07-07 | Stop reason: SDUPTHER

## 2021-03-22 ENCOUNTER — TELEPHONE (OUTPATIENT)
Dept: FAMILY MEDICINE CLINIC | Facility: CLINIC | Age: 56
End: 2021-03-22

## 2021-03-22 DIAGNOSIS — IMO0002 UNCONTROLLED TYPE 2 DIABETES MELLITUS WITH COMPLICATION, WITH LONG-TERM CURRENT USE OF INSULIN: Primary | ICD-10-CM

## 2021-03-22 NOTE — TELEPHONE ENCOUNTER
Fax received from [facility] requesting a new prescription for Novolog due to the patient's insurance no longer pays for admelog  Please advise  Thank you

## 2021-04-02 ENCOUNTER — APPOINTMENT (OUTPATIENT)
Dept: LAB | Facility: HOSPITAL | Age: 56
End: 2021-04-02
Payer: MEDICARE

## 2021-04-02 DIAGNOSIS — I10 UNCONTROLLED HYPERTENSION: ICD-10-CM

## 2021-04-02 DIAGNOSIS — E78.5 HYPERLIPIDEMIA ASSOCIATED WITH TYPE 2 DIABETES MELLITUS (HCC): ICD-10-CM

## 2021-04-02 DIAGNOSIS — E11.69 HYPERLIPIDEMIA ASSOCIATED WITH TYPE 2 DIABETES MELLITUS (HCC): ICD-10-CM

## 2021-04-02 DIAGNOSIS — IMO0002 UNCONTROLLED TYPE 2 DIABETES MELLITUS WITH COMPLICATION, WITH LONG-TERM CURRENT USE OF INSULIN: ICD-10-CM

## 2021-04-02 LAB
ALBUMIN SERPL BCP-MCNC: 3.7 G/DL (ref 3.4–4.8)
ALP SERPL-CCNC: 96.8 U/L (ref 35–140)
ALT SERPL W P-5'-P-CCNC: 17 U/L (ref 5–54)
ANION GAP SERPL CALCULATED.3IONS-SCNC: 8 MMOL/L (ref 4–13)
AST SERPL W P-5'-P-CCNC: 14 U/L (ref 15–41)
BILIRUB SERPL-MCNC: 0.47 MG/DL (ref 0.3–1.2)
BUN SERPL-MCNC: 11 MG/DL (ref 6–20)
CALCIUM SERPL-MCNC: 8.9 MG/DL (ref 8.4–10.2)
CHLORIDE SERPL-SCNC: 103 MMOL/L (ref 96–108)
CHOLEST SERPL-MCNC: 116 MG/DL
CO2 SERPL-SCNC: 30 MMOL/L (ref 22–33)
CORTIS AM PEAK SERPL-MCNC: 26 UG/DL (ref 4.2–22.4)
CREAT SERPL-MCNC: 0.65 MG/DL (ref 0.4–1.1)
EST. AVERAGE GLUCOSE BLD GHB EST-MCNC: 324 MG/DL
GFR SERPL CREATININE-BSD FRML MDRD: 116 ML/MIN/1.73SQ M
GLUCOSE P FAST SERPL-MCNC: 325 MG/DL (ref 70–105)
HBA1C MFR BLD: 12.9 %
HDLC SERPL-MCNC: 45 MG/DL
LDLC SERPL CALC-MCNC: 44 MG/DL (ref 0–100)
POTASSIUM SERPL-SCNC: 3.2 MMOL/L (ref 3.5–5)
PROT SERPL-MCNC: 6.8 G/DL (ref 6.4–8.3)
SODIUM SERPL-SCNC: 141 MMOL/L (ref 133–145)
TRIGL SERPL-MCNC: 133.7 MG/DL

## 2021-04-02 PROCEDURE — 82533 TOTAL CORTISOL: CPT

## 2021-04-02 PROCEDURE — 80053 COMPREHEN METABOLIC PANEL: CPT

## 2021-04-02 PROCEDURE — 82088 ASSAY OF ALDOSTERONE: CPT

## 2021-04-02 PROCEDURE — 84244 ASSAY OF RENIN: CPT

## 2021-04-02 PROCEDURE — 36415 COLL VENOUS BLD VENIPUNCTURE: CPT

## 2021-04-02 PROCEDURE — 80061 LIPID PANEL: CPT

## 2021-04-02 PROCEDURE — 83036 HEMOGLOBIN GLYCOSYLATED A1C: CPT

## 2021-04-13 LAB
ALDOST SERPL-MCNC: 10.6 NG/DL (ref 0–30)
ALDOST/RENIN PLAS-RTO: NORMAL {RATIO}
RENIN PLAS-CCNC: NORMAL NG/ML/HR

## 2021-04-13 RX ORDER — INSULIN ASPART 100 [IU]/ML
INJECTION, SOLUTION INTRAVENOUS; SUBCUTANEOUS
Qty: 15 ML | Refills: 5 | Status: SHIPPED | OUTPATIENT
Start: 2021-04-13 | End: 2021-04-16 | Stop reason: ALTCHOICE

## 2021-04-13 NOTE — ADDENDUM NOTE
Addended [place]: [facility] [place] 4/13/2021 03:00 PM     Modules accepted: Orders

## 2021-04-16 DIAGNOSIS — IMO0002 UNCONTROLLED TYPE 2 DIABETES MELLITUS WITH COMPLICATION, WITH LONG-TERM CURRENT USE OF INSULIN: Primary | ICD-10-CM

## 2021-04-16 RX ORDER — INSULIN HUMAN 500 [IU]/ML
40 INJECTION, SOLUTION SUBCUTANEOUS
Qty: 3 PEN | Refills: 3 | Status: SHIPPED | OUTPATIENT
Start: 2021-04-16 | End: 2021-11-23 | Stop reason: HOSPADM

## 2021-04-17 NOTE — PROGRESS NOTES
Patient currently on V-go pump 40 units daily + inject herself with 40 units of basaglar in the evening + 10 units of insulin from v-go pump before meals  - she uses about 100-110 units of insulin daily  - A1C >12%  - she says she takes insulin regularly  - She cannot take injections 4x daily, is agreeable to injections 3x daily before meals  Plan:  - stop V go pump and basaglar  - start U500 regular insulin 40 units before meals - 3x daily, she usually has only 2 meals, if she does not eat she was instructed to take 20 units    - counseled on the possibility of hypoglycemia and treatment of hypoglycemia  - patient should send log of sugars in 2 weeks

## 2021-04-18 ENCOUNTER — HOSPITAL ENCOUNTER (EMERGENCY)
Facility: HOSPITAL | Age: 56
Discharge: HOME | End: 2021-04-18
Payer: MEDICARE

## 2021-04-18 VITALS
DIASTOLIC BLOOD PRESSURE: 88 MMHG | HEART RATE: 94 BPM | OXYGEN SATURATION: 100 % | SYSTOLIC BLOOD PRESSURE: 164 MMHG | RESPIRATION RATE: 18 BRPM

## 2021-04-18 DIAGNOSIS — K21.9 GERD (GASTROESOPHAGEAL REFLUX DISEASE): Primary | ICD-10-CM

## 2021-04-18 LAB
ALBUMIN SERPL BCP-MCNC: 3.5 G/DL (ref 3.4–4.8)
ALP SERPL-CCNC: 86.3 U/L (ref 35–140)
ALT SERPL W P-5'-P-CCNC: 15 U/L (ref 5–54)
ANION GAP SERPL CALCULATED.3IONS-SCNC: 6 MMOL/L (ref 4–13)
APTT PPP: 26 SECONDS (ref 23–31)
AST SERPL W P-5'-P-CCNC: 13 U/L (ref 15–41)
BASOPHILS # BLD AUTO: 0.02 THOUSANDS/ΜL (ref 0–0.1)
BASOPHILS NFR BLD AUTO: 0 % (ref 0–1)
BILIRUB SERPL-MCNC: 0.52 MG/DL (ref 0.3–1.2)
BUN SERPL-MCNC: 11 MG/DL (ref 6–20)
CALCIUM SERPL-MCNC: 9.9 MG/DL (ref 8.4–10.2)
CHLORIDE SERPL-SCNC: 100 MMOL/L (ref 96–108)
CO2 SERPL-SCNC: 34 MMOL/L (ref 22–33)
CREAT SERPL-MCNC: 0.62 MG/DL (ref 0.4–1.1)
EOSINOPHIL # BLD AUTO: 0.04 THOUSAND/ΜL (ref 0–0.61)
EOSINOPHIL NFR BLD AUTO: 1 % (ref 0–6)
ERYTHROCYTE [DISTWIDTH] IN BLOOD BY AUTOMATED COUNT: 14.7 % (ref 11.6–15.1)
GFR SERPL CREATININE-BSD FRML MDRD: 118 ML/MIN/1.73SQ M
GLUCOSE SERPL-MCNC: 249 MG/DL (ref 65–140)
HCT VFR BLD AUTO: 41.7 % (ref 34.8–46.1)
HGB BLD-MCNC: 13.8 G/DL (ref 11.5–15.4)
IMM GRANULOCYTES # BLD AUTO: 0.01 THOUSAND/UL (ref 0–0.2)
IMM GRANULOCYTES NFR BLD AUTO: 0 % (ref 0–2)
INR PPP: 0.95 (ref 0.9–1.1)
LIPASE SERPL-CCNC: 8 U/L (ref 13–60)
LYMPHOCYTES # BLD AUTO: 2.09 THOUSANDS/ΜL (ref 0.6–4.47)
LYMPHOCYTES NFR BLD AUTO: 27 % (ref 14–44)
MCH RBC QN AUTO: 27.5 PG (ref 26.8–34.3)
MCHC RBC AUTO-ENTMCNC: 33.1 G/DL (ref 31.4–37.4)
MCV RBC AUTO: 83 FL (ref 82–98)
MONOCYTES # BLD AUTO: 0.78 THOUSAND/ΜL (ref 0.17–1.22)
MONOCYTES NFR BLD AUTO: 10 % (ref 4–12)
NEUTROPHILS # BLD AUTO: 4.91 THOUSANDS/ΜL (ref 1.85–7.62)
NEUTS SEG NFR BLD AUTO: 62 % (ref 43–75)
PLATELET # BLD AUTO: 279 THOUSANDS/UL (ref 149–390)
PMV BLD AUTO: 10.7 FL (ref 8.9–12.7)
POTASSIUM SERPL-SCNC: 3.1 MMOL/L (ref 3.5–5)
PROT SERPL-MCNC: 6.6 G/DL (ref 6.4–8.3)
PROTHROMBIN TIME: 10.8 SECONDS (ref 9.5–12.1)
RBC # BLD AUTO: 5.01 MILLION/UL (ref 3.81–5.12)
SODIUM SERPL-SCNC: 140 MMOL/L (ref 133–145)
TROPONIN I SERPL-MCNC: <0.03 NG/ML (ref 0–0.07)
WBC # BLD AUTO: 7.85 THOUSAND/UL (ref 4.31–10.16)

## 2021-04-18 PROCEDURE — 83690 ASSAY OF LIPASE: CPT

## 2021-04-18 PROCEDURE — 36415 COLL VENOUS BLD VENIPUNCTURE: CPT

## 2021-04-18 PROCEDURE — 99283 EMERGENCY DEPT VISIT LOW MDM: CPT

## 2021-04-18 PROCEDURE — 85025 COMPLETE CBC W/AUTO DIFF WBC: CPT

## 2021-04-18 PROCEDURE — 85730 THROMBOPLASTIN TIME PARTIAL: CPT

## 2021-04-18 PROCEDURE — 96374 THER/PROPH/DIAG INJ IV PUSH: CPT

## 2021-04-18 PROCEDURE — 84484 ASSAY OF TROPONIN QUANT: CPT

## 2021-04-18 PROCEDURE — 99285 EMERGENCY DEPT VISIT HI MDM: CPT

## 2021-04-18 PROCEDURE — 80053 COMPREHEN METABOLIC PANEL: CPT

## 2021-04-18 PROCEDURE — 85610 PROTHROMBIN TIME: CPT

## 2021-04-18 PROCEDURE — 96375 TX/PRO/DX INJ NEW DRUG ADDON: CPT

## 2021-04-18 PROCEDURE — 96361 HYDRATE IV INFUSION ADD-ON: CPT

## 2021-04-18 RX ORDER — SODIUM CHLORIDE 9 MG/ML
500 INJECTION, SOLUTION INTRAVENOUS ONCE
Status: COMPLETED | OUTPATIENT
Start: 2021-04-18 | End: 2021-04-18

## 2021-04-18 RX ORDER — METOCLOPRAMIDE HYDROCHLORIDE 5 MG/ML
10 INJECTION INTRAMUSCULAR; INTRAVENOUS ONCE
Status: COMPLETED | OUTPATIENT
Start: 2021-04-18 | End: 2021-04-18

## 2021-04-18 RX ORDER — ONDANSETRON 4 MG/1
4 TABLET, FILM COATED ORAL EVERY 6 HOURS
Qty: 12 TABLET | Refills: 0 | Status: SHIPPED | OUTPATIENT
Start: 2021-04-18 | End: 2021-06-20 | Stop reason: ALTCHOICE

## 2021-04-18 RX ORDER — MAGNESIUM HYDROXIDE/ALUMINUM HYDROXICE/SIMETHICONE 120; 1200; 1200 MG/30ML; MG/30ML; MG/30ML
30 SUSPENSION ORAL ONCE
Status: COMPLETED | OUTPATIENT
Start: 2021-04-18 | End: 2021-04-18

## 2021-04-18 RX ADMIN — SODIUM CHLORIDE 500 ML/HR: 0.9 INJECTION, SOLUTION INTRAVENOUS at 14:15

## 2021-04-18 RX ADMIN — METOCLOPRAMIDE HYDROCHLORIDE 10 MG: 5 INJECTION INTRAMUSCULAR; INTRAVENOUS at 14:16

## 2021-04-18 RX ADMIN — ALUMINA, MAGNESIA, AND SIMETHICONE ORAL SUSPENSION REGULAR STRENGTH 30 ML: 1200; 1200; 120 SUSPENSION ORAL at 14:18

## 2021-04-18 RX ADMIN — MORPHINE SULFATE 2 MG: 2 INJECTION, SOLUTION INTRAMUSCULAR; INTRAVENOUS at 14:17

## 2021-04-18 NOTE — ED PROVIDER NOTES
History  Chief Complaint   Patient presents with    Heartburn     pt states "my gerd is really acting up, since last night", takes protonix daily, says that the burning in her throat is intense   Back Pain     hx of chronic back problems  "my gerd is causing it to feel worse"     81-year-old female history multiple medical problems including insulin controlled diabetes hypertension GERD ascending aneurysm of aorta presents secondary to 3 day history of epigastric burning sensation foul taste in her mouth with vomiting and nausea  Patient states this is likely a flare-up of her GERD states she has been taking Protonix potassium been helping  Patient denies vomiting up any blood denies any blood in stool denies any melena  Patient denies any associated chest pain or shortness of breath             Prior to Admission Medications   Prescriptions Last Dose Informant Patient Reported? Taking?    ALPRAZolam (XANAX) 0 5 mg tablet   No No   Sig: "ONE-HALF" TABLET TWO TIMES A DAY   [facility] IRON 240 (27 Fe) MG tablet  Self No No   Sig: TAKE 1 TABLET (240 MG TOTAL) BY MOUTH 3 (THREE) TIMES A DAY WITH MEALS   Insulin Pen Needle (UNIFINE PENTIPS PLUS) 31G X 6 MM MISC  Self Yes No   Si Device by Does not apply route 4 (four) times a day   Insulin Regular Human, Conc, (HumuLIN R U-500 KwikPen) 500 units/mL CONCENTRATED U-500 injection pen   No No   Sig: Inject 40 Units under the skin 3 (three) times a day before meals   Lancets MISC  Self Yes No   Si Device by Does not apply route 4 (four) times a day   Magnesium 400 MG TABS  Self Yes No   Sig: Take by mouth   TRELEGY ELLIPTA 100-62 5-25 MCG/INH inhaler  Self No No   Sig: ONE PUFF EVERY DAY   Trulicity 1 5 EK/7 3ZH SOPN   No No   Sig: INJECT 0 5 ML EVERY WEEK BY SUBCUTANEOUS ROUTE    amLODIPine (NORVASC) 10 mg tablet  Self Yes No   Sig: Take 10 mg by mouth daily   atorvastatin (LIPITOR) 40 mg tablet   No No   Sig: Take 1 tablet (40 mg total) by mouth daily   bacitracin topical ointment 500 units/g topical ointment   No No   Sig: Apply to bilateral nares twice per day     ciprofloxacin-dexamethasone (CIPRODEX) otic suspension   No No   Sig: Administer 4 drops to the right ear 2 (two) times a day   cloNIDine (CATAPRES-TTS-3) 0 3 mg/24 hr   No No   Sig: APPLY 1 PATCH EVERY WEEK BY TRANSDERMAL   cyclobenzaprine (FLEXERIL) 10 mg tablet  Self No No   Sig: Take 1 tablet (10 mg total) by mouth 3 (three) times a day as needed for muscle spasms for up to 10 days   dexamethasone (DECADRON) 1 mg tablet   No No   Sig: Take 1 tablet (1 mg total) by mouth 2 (two) times a day with meals Take tablet between 10-11pm and then have lab next day in the morning 7-9am    diclofenac (VOLTAREN) 75 mg EC tablet  Self No No   Sig: Take 1 tablet (75 mg total) by mouth 2 (two) times a day   doxazosin (CARDURA) 4 mg tablet  Self Yes No   Sig: Take 4 mg by mouth daily   ergocalciferol (VITAMIN D2) 50,000 units  Self No No   Sig: Take 1 capsule (50,000 Units total) by mouth 2 (two) times a week   fenofibrate (TRICOR) 145 mg tablet   No No   Sig: TAKE ONE TABLET EVERY DAY   hydrALAZINE (APRESOLINE) 50 mg tablet   No No   Sig: TAKE TWO TABLETS (100MG) TWO TIMES A DAY   hydrochlorothiazide (HYDRODIURIL) 25 mg tablet  Self No No   Sig: TAKE ONE TABLET EVERY DAY   ibuprofen (MOTRIN) 800 mg tablet  Self No No   Sig: Take 1 tablet (800 mg total) by mouth every 6 (six) hours as needed for moderate pain   lidocaine (LIDODERM) 5 %  Self No No   Sig: Apply 1 patch topically daily Remove & Discard patch within 12 hours or as directed by MD   lisinopril (ZESTRIL) 40 mg tablet   No No   Sig: TAKE ONE TABLET TWO TIMES A DAY   meclizine (ANTIVERT) 25 mg tablet  Self No No   Sig: Take 1 tablet (25 mg total) by mouth every 8 (eight) hours as needed for dizziness   metFORMIN (GLUCOPHAGE) 1000 MG tablet   No No   Sig: TAKE 1 TABLET TWICE A DAY BY ORAL ROUTE   metoprolol succinate (TOPROL-XL) 200 MG 24 hr tablet   No No   Sig: TAKE 1 TABLET EVERY DAY BY ORAL ROUTE   mupirocin (BACTROBAN) 2 % nasal ointment   No No   Sig: into each nostril 2 (two) times a day   oxyCODONE-acetaminophen (PERCOCET) 5-325 mg per tablet   No No   Sig: Take 1 tablet by mouth every 4 (four) hours as needed for moderate pain for up to 8 dosesMax Daily Amount: 6 tablets   pantoprazole (PROTONIX) 20 mg tablet  Self No No   Sig: Take 2 tablets (40 mg total) by mouth daily   pioglitazone (ACTOS) 30 mg tablet   No No   Sig: Take 1 tablet (30 mg total) by mouth daily   pregabalin (LYRICA) 75 mg capsule   No No   Sig: TAKE ONE CAPSULE EVERY DAY   spironolactone (ALDACTONE) 25 mg tablet  Self Yes No   Sig: Take 25 mg by mouth daily    zolpidem (AMBIEN) 10 mg tablet   No No   Sig: TAKE 1 TABLET (10 MG TOTAL) BY MOUTH DAILY AT BEDTIME AS NEEDED FOR SLEEP      Facility-Administered Medications: None       Past Medical History:   Diagnosis Date    Anxiety     Aortic aneurysm (HCC)     Arthritis     Depression     Diabetes mellitus (HCC)     Fibromyalgia     GERD (gastroesophageal reflux disease)     GERD (gastroesophageal reflux disease)     H/O cardiovascular stress test 2018    no ischemia  EF 70%   H/O echocardiogram 2019    EF 60%  Mild LVH  trivial effusion   Hyperlipidemia     Hypertension     Migraines     Psychiatric disorder     anxiety       Past Surgical History:   Procedure Laterality Date    BACK SURGERY      Lumbar epidural steroid injection    CARPAL TUNNEL RELEASE Left      SECTION      CHOLECYSTECTOMY      COLONOSCOPY      incomplete    HYSTERECTOMY      Total    OVARIAN CYST REMOVAL      TUBAL LIGATION         Family History   Problem Relation Age of Onset    Hypertension Mother    Oliv[place] Arthritis Mother     Diabetes Father     Other Sister         renal cell carcinoma    Diabetes Other      I have reviewed and agree with the history as documented      E-Cigarette/Vaping    E-Cigarette Use Never User      E-Cigarette/Vaping Substances    Nicotine No     THC No     CBD No     Flavoring No     Other No     Unknown No      Social History     Tobacco Use    Smoking status: Current Every Day Smoker     Packs/day: 1 00     Years: 30 00     Pack years: 30 00     Types: Cigarettes    Smokeless tobacco: Never Used   Substance Use Topics    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   Drug use: Yes     Types: Marijuana       Review of Systems   Constitutional: Negative for chills and fever  HENT: Negative for congestion  Eyes: Negative for visual disturbance  Respiratory: Negative for shortness of breath  Cardiovascular: Negative for chest pain  Gastrointestinal: Positive for abdominal pain, nausea and vomiting  Endocrine: Negative for cold intolerance  Genitourinary: Negative for frequency  Musculoskeletal: Negative for gait problem  Skin: Negative for rash  Neurological: Negative for dizziness  Psychiatric/Behavioral: Negative for behavioral problems and confusion  Physical Exam  Physical Exam  Vitals signs and nursing note reviewed  Constitutional:       General: She is in acute distress (moderate distress due to pain and nausea)  Appearance: She is well-developed  HENT:      Head: Normocephalic and atraumatic  Nose: Nose normal       Mouth/Throat:      Mouth: Mucous membranes are moist    Eyes:      Conjunctiva/sclera: Conjunctivae normal       Pupils: Pupils are equal, round, and reactive to light  Neck:      Musculoskeletal: Normal range of motion and neck supple  Cardiovascular:      Rate and Rhythm: Normal rate and regular rhythm  Heart sounds: Normal heart sounds  Pulmonary:      Effort: Pulmonary effort is normal       Breath sounds: Normal breath sounds  Abdominal:      General: Bowel sounds are normal       Palpations: Abdomen is soft  Comments: Mid epigastric pain to palpation   Musculoskeletal: Normal range of motion     Skin:     General: Skin is warm and dry  Capillary Refill: Capillary refill takes less than 2 seconds  Neurological:      Mental Status: She is alert and oriented to person, place, and time     Psychiatric:         Behavior: Behavior normal          Vital Signs  ED Triage Vitals [04/18/21 1322]   Temp Pulse Respirations Blood Pressure SpO2   -- 94 18 (!) 180/97 100 %      Temp src Heart Rate Source Patient Position - Orthostatic VS BP Location FiO2 (%)   -- Monitor Lying Right arm --      Pain Score       --           Vitals:    04/18/21 1322 04/18/21 1324   BP: (!) 180/97 164/88   Pulse: 94    Patient Position - Orthostatic VS: Lying          Visual Acuity      ED Medications  Medications   sodium chloride 0 9 % infusion (500 mL/hr Intravenous New Bag 4/18/21 1415)   metoclopramide (REGLAN) injection 10 mg (10 mg Intravenous Given 4/18/21 1416)   aluminum-magnesium hydroxide-simethicone (MYLANTA) oral suspension 30 mL (30 mL Oral Given 4/18/21 1418)   morphine injection 2 mg (2 mg Intravenous Given 4/18/21 1417)       Diagnostic Studies  Results Reviewed     Procedure Component Value Units Date/Time    Troponin I [[id number]]  (Normal) Collected: 04/18/21 1413    Lab Status: Final result Specimen: Blood from Arm, Left Updated: 04/18/21 1446     Troponin I <0 03 ng/mL     Comprehensive metabolic panel [[id number]]  (Abnormal) Collected: 04/18/21 1413    Lab Status: Final result Specimen: Blood from Arm, Left Updated: 04/18/21 1444     Sodium 140 mmol/L      Potassium 3 1 mmol/L      Chloride 100 mmol/L      CO2 34 mmol/L      ANION GAP 6 mmol/L      BUN 11 mg/dL      Creatinine 0 62 mg/dL      Glucose 249 mg/dL      Calcium 9 9 mg/dL      AST 13 U/L      ALT 15 U/L      Alkaline Phosphatase 86 3 U/L      Total Protein 6 6 g/dL      Albumin 3 5 g/dL      Total Bilirubin 0 52 mg/dL      eGFR 118 ml/min/1 73sq m     Narrative:      Jamin guidelines for Chronic Kidney Disease (CKD):     Stage 1 with normal or high GFR (GFR > 90 mL/min/1 73 square meters)    Stage 2 Mild CKD (GFR = 60-89 mL/min/1 73 square meters)    Stage 3A Moderate CKD (GFR = 45-59 mL/min/1 73 square meters)    Stage 3B Moderate CKD (GFR = 30-44 mL/min/1 73 square meters)    Stage 4 Severe CKD (GFR = 15-29 mL/min/1 73 square meters)    Stage 5 End Stage CKD (GFR <15 mL/min/1 73 square meters)  Note: GFR calculation is accurate only with a steady state creatinine    Lipase [[id number]]  (Abnormal) Collected: 04/18/21 1413    Lab Status: Final result Specimen: Blood from Arm, Left Updated: 04/18/21 1444     Lipase 8 u/L     Protime-INR [[id number]]  (Normal) Collected: 04/18/21 1413    Lab Status: Final result Specimen: Blood from Arm, Left Updated: 04/18/21 1437     Protime 10 8 seconds      INR 0 95    Narrative:      INR Reference Ranges:  No Anticoagulant, Normal:           0 9-1 1  Standard Dose, Oral Anticoagulant:  2 0-3 0  High Dose, Oral Anticoagulant:      2 5-3 5    APTT [[id number]]  (Normal) Collected: 04/18/21 1413    Lab Status: Final result Specimen: Blood from Arm, Left Updated: 04/18/21 1437     PTT 26 seconds     CBC and differential [[id number]]  (Normal) Collected: 04/18/21 1413    Lab Status: Final result Specimen: Blood from Arm, Left Updated: 04/18/21 1426     WBC 7 85 Thousand/uL      RBC 5 01 Million/uL      Hemoglobin 13 8 g/dL      Hematocrit 41 7 %      MCV 83 fL      MCH 27 5 pg      MCHC 33 1 g/dL      RDW 14 7 %      MPV 10 7 fL      Platelets 937 Thousands/uL      Neutrophils Relative 62 %      Immat GRANS % 0 %      Lymphocytes Relative 27 %      Monocytes Relative 10 %      Eosinophils Relative 1 %      Basophils Relative 0 %      Neutrophils Absolute 4 91 Thousands/µL      Immature Grans Absolute 0 01 Thousand/uL      Lymphocytes Absolute 2 09 Thousands/µL      Monocytes Absolute 0 78 Thousand/µL      Eosinophils Absolute 0 04 Thousand/µL      Basophils Absolute 0 02 Thousands/µL                  No orders to display              Procedures  Procedures         ED Course                             SBIRT 20yo+      Most Recent Value   SBIRT (24 yo +)   In order to provide better care to our patients, we are screening all of our patients for alcohol and drug use  Would it be okay to ask you these screening questions? Yes Filed at: 04/18/2021 1414   Initial Alcohol Screen: US AUDIT-C    1  How often do you have a drink containing alcohol?  0 Filed at: 04/18/2021 1414   2  How many drinks containing alcohol do you have on a typical day you are drinking? 0 Filed at: 04/18/2021 1414   3a  Male UNDER 65: How often do you have five or more drinks on one occasion? 0 Filed at: 04/18/2021 1414   3b  FEMALE Any Age, or MALE 65+: How often do you have 4 or more drinks on one occassion? 0 Filed at: 04/18/2021 1414   Audit-C Score  0 Filed at: 04/18/2021 1414   PAVEL: How many times in the past year have you    Used an illegal drug or used a prescription medication for non-medical reasons? Never Filed at: 04/18/2021 1414                    MDM  Number of Diagnoses or Management Options  Diagnosis management comments: Patient was monitored in the emergency department she received 1 L normal saline she received Zofran 4 mg IV she received Mylanta 30 cc p o  Patient did has significant improvement of her symptoms lab work demonstrated no acute findings lipase not elevated chemistry CBC essentially unremarkable patient was much improved on my re-evaluation at 3:27 p m  Joni Norton Plan is to discharge patient home Zofran p r n   Mylanta 2 tbsp every 4-6 hours as needed for abdominal cramps continue with her Protonix and follow-up with her primary physician for recheck in 2-3 days for possible GI referral      Disposition  Final diagnoses:   GERD (gastroesophageal reflux disease)     Time reflects when diagnosis was documented in both MDM as applicable and the Disposition within this note     Time User Action Codes Description Comment 4/18/2021  3:28 PM Miranda Keller Add [K21 9] GERD (gastroesophageal reflux disease)       ED Disposition     ED Disposition Condition Date/Time Comment    Discharge Stable Sun Apr 18, 2021  3:28 PM Nisreen Cunninghamus discharge to home/self care  Follow-up Information     Follow up With Specialties Details Why Contact Info    Gentry Wyman MD [facility] Medicine Schedule an appointment as soon as possible for a visit in 2 days  [address] [place] [address]  [phone]            Patient's Medications   Discharge Prescriptions    ONDANSETRON (ZOFRAN) 4 MG TABLET    Take 1 tablet (4 mg total) by mouth every 6 (six) hours       Start Date: 4/18/2021 End Date: --       Order Dose: 4 mg       Quantity: 12 tablet    Refills: 0     No discharge procedures on file      PDMP Review       Value Time User    PDMP Reviewed  Yes 3/8/2021  9:59 PM Mert Jett DO          ED Provider  Electronically Signed by           Cheryl Zhang MD  04/18/21 0235

## 2021-04-20 ENCOUNTER — OFFICE VISIT (OUTPATIENT)
Dept: FAMILY MEDICINE CLINIC | Facility: CLINIC | Age: 56
End: 2021-04-20
Payer: MEDICARE

## 2021-04-20 VITALS
HEART RATE: 94 BPM | WEIGHT: 210.1 LBS | DIASTOLIC BLOOD PRESSURE: 98 MMHG | SYSTOLIC BLOOD PRESSURE: 158 MMHG | BODY MASS INDEX: 30.15 KG/M2 | OXYGEN SATURATION: 97 % | RESPIRATION RATE: 20 BRPM | TEMPERATURE: 97.9 F

## 2021-04-20 DIAGNOSIS — Z72.0 TOBACCO ABUSE: ICD-10-CM

## 2021-04-20 DIAGNOSIS — K21.00 GASTROESOPHAGEAL REFLUX DISEASE WITH ESOPHAGITIS, UNSPECIFIED WHETHER HEMORRHAGE: Primary | ICD-10-CM

## 2021-04-20 DIAGNOSIS — R19.7 DIARRHEA, UNSPECIFIED TYPE: ICD-10-CM

## 2021-04-20 PROCEDURE — 99213 OFFICE O/P EST LOW 20 MIN: CPT | Performed by: FAMILY MEDICINE

## 2021-04-20 NOTE — ASSESSMENT & PLAN NOTE
Patient continues to have severe GERD symptoms  Says that she does not consume anything in particular that triggers it  She says that she has had the symptoms for over 10 years now and occasionally gets worse  She remembers having an EGD done in the past but is unsure when but states that there were ulcers found      - Will refer to Gastroenterology for possible EGD   - Continue Protonix 40 mg daily  -  Continue Mylanta which was recommended to her from the ED

## 2021-04-20 NOTE — PROGRESS NOTES
Family Medicine Follow-Up Office Visit  Lidia Shannon 48 y o  female   MRN: [id number] : 1965  ENCOUNTER: 2021 1:24 PM    Assessment and Plan   Gastroesophageal reflux disease with esophagitis   Patient continues to have severe GERD symptoms  Says that she does not consume anything in particular that triggers it  She says that she has had the symptoms for over 10 years now and occasionally gets worse  She remembers having an EGD done in the past but is unsure when but states that there were ulcers found  - Will refer to Gastroenterology for possible EGD   - Continue Protonix 40 mg daily  -  Continue Mylanta which was recommended to her from the ED    Diarrhea   2 day history of watery diarrhea  Patient has been taking Mylanta with only little relief  Denies any fevers blood in stool or floating stools  No sick contacts  Most likely due to gastroenteritis  -  Recommend continue Mylanta  -  Increase p o  hydration  If using sports drinks recommend either low sugar were no sugar options as patient is diabetic   - Strict ED precautions    Follow-up if patient's symptoms not improved within the next 3 days    Tobacco abuse   Patient states that she smokes 1 pack per day for many years  Currently does not have any intention to quit  - Recommend at least reducing the amount of cigarettes smoked per day as this could be playing a role in her severe GERD symptoms  -   Will continue to assess at subsequent visits  Tobacco and Toxic Substance Assessment and Intervention:     Tobacco use screening performed    Brief intervention performed for tobacco, alcohol, or drug use        Chief Complaint     Chief Complaint   Patient presents with    GERD       History of Present Illness   Kelli Mcclellan is a 48y o -year-old female who presents today for  A follow-up after emergency room visit  And she was in emergency room under   Due to severe GERD symptoms    She has had the symptoms for many many years but states that recently the symptoms have worsened  She states that she takes Protonix 40 mg a day with only little relief  Today she complains of watery diarrhea  Since 4/19  Denies any blood or floating stool  Denies any fevers or any constitutional symptoms  Review of Systems   Review of Systems   Constitutional: Positive for appetite change (decreased)  Negative for activity change, chills, fatigue and fever  HENT: Negative for congestion, rhinorrhea, sneezing and sore throat  Respiratory: Negative for cough, chest tightness, shortness of breath and wheezing  Cardiovascular: Negative for chest pain and palpitations  Gastrointestinal: Positive for abdominal pain, diarrhea and nausea  Negative for abdominal distention, blood in stool, constipation and vomiting  Musculoskeletal: Negative for arthralgias, back pain, joint swelling and myalgias  Skin: Negative for rash  Neurological: Negative for dizziness, weakness, numbness and headaches  All other systems reviewed and are negative        Active Problem List     Patient Active Problem List   Diagnosis    Anxiety    Cardiac murmur    Carpal tunnel syndrome of left wrist    Change in bowel habit    Chronic pain disorder    Cough    Depression[facility] recurrent [facility]    Retinal hemorrhage due to secondary diabetes ([facility] )    Diabetic peripheral neuropathy (HCC)    Dizziness    Uncontrolled hypertension    Fibromyalgia    Gastroesophageal reflux disease with esophagitis    Hemangioma of bone    Hyperlipidemia associated with type 2 diabetes mellitus ([facility] )    Hypertension    Hypoestrogenism    Low back pain    Lumbar radiculopathy    Medically complex patient    Neuropathy    Non compliance with medical treatment    Noncompliance with diet and medication regimen    Overweight    Pancreatitis    Primary insomnia    Right-sided thoracic back pain    Sciatica of left side    Screening for colon cancer    Shingles    Thoracic radiculitis    Tobacco abuse    Uncontrolled type 2 diabetes mellitus with complication, with long-term current use of insulin (HCC)    Vertebral body hemangioma    Vertigo    Vaginal discharge    Yeast vaginitis    Recurrent vaginitis    Abnormal urinalysis    Thoracic aortic aneurysm without rupture (HCC)    Epigastric pain    Urinary tract infection with hematuria    Bacterial vaginosis    Abscess    Palpitations    Nasal vestibulitis    Orthopnea    CHANTALE (obstructive sleep apnea)    Diarrhea       Past Medical History, Past Surgical History, Family History, and Social History were reviewed and updated today as appropriate  Objective   /98 (BP Location: Left arm, Patient Position: Sitting, Cuff Size: Large)   Pulse 94   Temp 97 9 °F (36 6 °C)   Resp 20   Wt 95 3 kg (210 lb 1 6 oz)   SpO2 97%   BMI 30 15 kg/m²     Physical Exam  Vitals signs reviewed  Constitutional:       General: She is not in acute distress  Appearance: She is well-developed  She is not diaphoretic  HENT:      Head: Normocephalic and atraumatic  Eyes:      General: No scleral icterus  Right eye: No discharge  Left eye: No discharge  Conjunctiva/sclera: Conjunctivae normal    Cardiovascular:      Rate and Rhythm: Normal rate and regular rhythm  Heart sounds: Normal heart sounds  No murmur  Pulmonary:      Effort: Pulmonary effort is normal  No respiratory distress  Breath sounds: Normal breath sounds  No wheezing  Abdominal:      General: There is no distension (epigastric)  Palpations: Abdomen is soft  Tenderness: There is abdominal tenderness  Musculoskeletal: Normal range of motion  General: No tenderness  Lymphadenopathy:      Cervical: No cervical adenopathy  Skin:     General: Skin is warm and dry  Coloration: Skin is not pale  Findings: No erythema  Neurological:      Mental Status: She is alert  Psychiatric:         Behavior: Behavior normal            Pertinent Laboratory/Diagnostic Studies:  Lab Results   Component Value Date    GLUCOSE 230 (H) 07/19/2015    BUN 11 04/18/2021    CREATININE 0 62 04/18/2021    CALCIUM 9 9 04/18/2021     07/19/2015    K 3 1 (L) 04/18/2021    CO2 34 (H) 04/18/2021     04/18/2021     Lab Results   Component Value Date    ALT 15 04/18/2021    AST 13 (L) 04/18/2021    ALKPHOS 86 3 04/18/2021    BILITOT 0 43 07/19/2015       Lab Results   Component Value Date    WBC 7 85 04/18/2021    HGB 13 8 04/18/2021    HCT 41 7 04/18/2021    MCV 83 04/18/2021     04/18/2021       No results found for: TSH    No results found for: CHOL  Lab Results   Component Value Date    TRIG 133 7 04/02/2021     Lab Results   Component Value Date    HDL 45 (L) 04/02/2021     Lab Results   Component Value Date    LDLCALC 44 04/02/2021     Lab Results   Component Value Date    HGBA1C 12 9 (H) 04/02/2021       Results for orders placed or performed during the hospital encounter of 04/18/21   CBC and differential   Result Value Ref Range    WBC 7 85 4 31 - 10 16 Thousand/uL    RBC 5 01 3 81 - 5 12 Million/uL    Hemoglobin 13 8 11 5 - 15 4 g/dL    Hematocrit 41 7 34 8 - 46 1 %    MCV 83 82 - 98 fL    MCH 27 5 26 8 - 34 3 pg    MCHC 33 1 31 4 - 37 4 g/dL    RDW 14 7 11 6 - 15 1 %    MPV 10 7 8 9 - 12 7 fL    Platelets 029 050 - 358 Thousands/uL    Neutrophils Relative 62 43 - 75 %    Immat GRANS % 0 0 - 2 %    Lymphocytes Relative 27 14 - 44 %    Monocytes Relative 10 4 - 12 %    Eosinophils Relative 1 0 - 6 %    Basophils Relative 0 0 - 1 %    Neutrophils Absolute 4 91 1 85 - 7 62 Thousands/µL    Immature Grans Absolute 0 01 0 00 - 0 20 Thousand/uL    Lymphocytes Absolute 2 09 0 60 - 4 47 Thousands/µL    Monocytes Absolute 0 78 0 17 - 1 22 Thousand/µL    Eosinophils Absolute 0 04 0 00 - 0 61 Thousand/µL    Basophils Absolute 0 02 0 00 - 0 10 Thousands/µL   Comprehensive metabolic panel Result Value Ref Range    Sodium 140 133 - 145 mmol/L    Potassium 3 1 (L) 3 5 - 5 0 mmol/L    Chloride 100 96 - 108 mmol/L    CO2 34 (H) 22 - 33 mmol/L    ANION GAP 6 4 - 13 mmol/L    BUN 11 6 - 20 mg/dL    Creatinine 0 62 0 40 - 1 10 mg/dL    Glucose 249 (H) 65 - 140 mg/dL    Calcium 9 9 8 4 - 10 2 mg/dL    AST 13 (L) 15 - 41 U/L    ALT 15 5 - 54 U/L    Alkaline Phosphatase 86 3 35 - 140 U/L    Total Protein 6 6 6 4 - 8 3 g/dL    Albumin 3 5 3 4 - 4 8 g/dL    Total Bilirubin 0 52 0 30 - 1 20 mg/dL    eGFR 118 ml/min/1 73sq m   Lipase   Result Value Ref Range    Lipase 8 (L) 13 - 60 u/L   Troponin I   Result Value Ref Range    Troponin I <0 03 0 00 - 0 07 ng/mL   Protime-INR   Result Value Ref Range    Protime 10 8 9 5 - 12 1 seconds    INR 0 95 0 90 - 1 10   APTT   Result Value Ref Range    PTT 26 23 - 31 seconds       Orders Placed This Encounter   Procedures    Ambulatory referral to Gastroenterology         Current Medications     Current Outpatient Medications   Medication Sig Dispense Refill    ALPRAZolam (XANAX) 0 5 mg tablet "ONE-HALF" TABLET TWO TIMES A DAY 90 tablet 1    amLODIPine (NORVASC) 10 mg tablet Take 10 mg by mouth daily      atorvastatin (LIPITOR) 40 mg tablet Take 1 tablet (40 mg total) by mouth daily 90 tablet 2    doxazosin (CARDURA) 4 mg tablet Take 4 mg by mouth daily      fenofibrate (TRICOR) 145 mg tablet TAKE ONE TABLET EVERY DAY 90 tablet 6    hydrALAZINE (APRESOLINE) 50 mg tablet TAKE TWO TABLETS (100MG) TWO TIMES A  tablet 3    hydrochlorothiazide (HYDRODIURIL) 25 mg tablet TAKE ONE TABLET EVERY DAY 90 tablet 1    ibuprofen (MOTRIN) 800 mg tablet Take 1 tablet (800 mg total) by mouth every 6 (six) hours as needed for moderate pain 30 tablet 0    Insulin Regular Human, Conc, (HumuLIN R U-500 KwikPen) 500 units/mL CONCENTRATED U-500 injection pen Inject 40 Units under the skin 3 (three) times a day before meals 3 pen 3    lisinopril (ZESTRIL) 40 mg tablet TAKE ONE TABLET TWO TIMES A  tablet 1    metoprolol succinate (TOPROL-XL) 200 MG 24 hr tablet TAKE 1 TABLET EVERY DAY BY ORAL ROUTE 50 tablet 3    ondansetron (ZOFRAN) 4 mg tablet Take 1 tablet (4 mg total) by mouth every 6 (six) hours 12 tablet 0    pantoprazole (PROTONIX) 20 mg tablet Take 2 tablets (40 mg total) by mouth daily 30 tablet 3    pioglitazone (ACTOS) 30 mg tablet Take 1 tablet (30 mg total) by mouth daily 90 tablet 1    pregabalin (LYRICA) 75 mg capsule TAKE ONE CAPSULE EVERY DAY 90 capsule 1    spironolactone (ALDACTONE) 25 mg tablet Take 25 mg by mouth daily       TRELEGY ELLIPTA 100-62 5-25 MCG/INH inhaler ONE PUFF EVERY DAY 60 each 1    Trulicity 1 5 XE/1 0PW SOPN INJECT 0 5 ML EVERY WEEK BY SUBCUTANEOUS ROUTE  2 mL 1    zolpidem (AMBIEN) 10 mg tablet TAKE 1 TABLET (10 MG TOTAL) BY MOUTH DAILY AT BEDTIME AS NEEDED FOR SLEEP 90 tablet 0    bacitracin topical ointment 500 units/g topical ointment Apply to bilateral nares twice per day   15 g 3    ciprofloxacin-dexamethasone (CIPRODEX) otic suspension Administer 4 drops to the right ear 2 (two) times a day 7 5 mL 0    cloNIDine (CATAPRES-TTS-3) 0 3 mg/24 hr APPLY 1 PATCH EVERY WEEK BY TRANSDERMAL (Patient not taking: Reported on 4/20/2021) 12 patch 3    [facility] IRON 240 (27 Fe) MG tablet TAKE 1 TABLET (240 MG TOTAL) BY MOUTH 3 (THREE) TIMES A DAY WITH MEALS 90 tablet 1    cyclobenzaprine (FLEXERIL) 10 mg tablet Take 1 tablet (10 mg total) by mouth 3 (three) times a day as needed for muscle spasms for up to 10 days 30 tablet 0    dexamethasone (DECADRON) 1 mg tablet Take 1 tablet (1 mg total) by mouth 2 (two) times a day with meals Take tablet between 10-11pm and then have lab next day in the morning 7-9am  1 tablet 0    diclofenac (VOLTAREN) 75 mg EC tablet Take 1 tablet (75 mg total) by mouth 2 (two) times a day (Patient not taking: Reported on 4/20/2021) 60 tablet 0    ergocalciferol (VITAMIN D2) 50,000 units Take 1 capsule (50,000 Units total) by mouth 2 (two) times a week (Patient not taking: Reported on 4/20/2021) 24 capsule 1    Insulin Pen Needle (UNIFINE PENTIPS PLUS) 31G X 6 MM MISC 1 Device by Does not apply route 4 (four) times a day      Lancets MISC 1 Device by Does not apply route 4 (four) times a day      lidocaine (LIDODERM) 5 % Apply 1 patch topically daily Remove & Discard patch within 12 hours or as directed by MD 30 patch 0    Magnesium 400 MG TABS Take by mouth      meclizine (ANTIVERT) 25 mg tablet Take 1 tablet (25 mg total) by mouth every 8 (eight) hours as needed for dizziness 30 tablet 0    metFORMIN (GLUCOPHAGE) 1000 MG tablet TAKE 1 TABLET TWICE A DAY BY ORAL ROUTE 180 tablet 3    mupirocin (BACTROBAN) 2 % nasal ointment into each nostril 2 (two) times a day 10 g 0    oxyCODONE-acetaminophen (PERCOCET) 5-325 mg per tablet Take 1 tablet by mouth every 4 (four) hours as needed for moderate pain for up to 8 dosesMax Daily Amount: 6 tablets (Patient not taking: Reported on 4/20/2021) 8 tablet 0     No current facility-administered medications for this visit          ALLERGIES:  No Known Allergies    Health Maintenance     Health Maintenance   Topic Date Due    Diabetic Foot Exam  Never done    COVID-19 Vaccine (1) Never done    BMI: Followup Plan  Never done    Lung Cancer Screening  Never done    PT PLAN OF CARE  11/08/2020    MAMMOGRAM  11/14/2020    HEMOGLOBIN A1C  07/02/2021    Influenza Vaccine (1) 10/01/2021 (Originally 9/1/2020)    DM Eye Exam  01/04/2022    Medicare Annual Wellness Visit (AWV)  02/15/2022    Depression Remission PHQ  02/15/2022    BMI: Adult  04/20/2022    Cervical Cancer Screening  07/26/2022    Colonoscopy Surveillance  05/22/2024    Colorectal Cancer Screening  05/22/2029    DTaP,Tdap,and Td Vaccines (3 - Td) 02/15/2030    HIV Screening  Completed    Pneumococcal Vaccine: Pediatrics (0 to 5 Years) and At-Risk Patients (6 to 59 Years)  Completed    HIB Vaccine  Rl Flroian Hepatitis B Vaccine  Aged Out    IPV Vaccine  Aged Out    Hepatitis A Vaccine  Aged Out    Meningococcal ACWY Vaccine  Aged Out    HPV Vaccine  Aged Out     Immunization History   Administered Date(s) Administered    INFLUENZA 09/16/2011    Pneumococcal Polysaccharide PPV23 12/15/2017    Tdap 12/15/2017, 02/15/2020         Angelika Lancaster MD   [address]  4/20/2021  1:24 PM    Parts of this note were dictated using [employer] dictation software and may have sounds-like errors due to variation in pronunciation

## 2021-04-20 NOTE — ASSESSMENT & PLAN NOTE
2 day history of watery diarrhea  Patient has been taking Mylanta with only little relief  Denies any fevers blood in stool or floating stools  No sick contacts  Most likely due to gastroenteritis  -  Recommend continue Mylanta  -  Increase p o  hydration    If using sports drinks recommend either low sugar were no sugar options as patient is diabetic   - Strict ED precautions    Follow-up if patient's symptoms not improved within the next 3 days

## 2021-04-20 NOTE — ASSESSMENT & PLAN NOTE
Patient states that she smokes 1 pack per day for many years  Currently does not have any intention to quit  - Recommend at least reducing the amount of cigarettes smoked per day as this could be playing a role in her severe GERD symptoms  -   Will continue to assess at subsequent visits

## 2021-04-23 DIAGNOSIS — Z01.812 ENCOUNTER FOR PREPROCEDURE SCREENING LABORATORY TESTING FOR COVID-19: ICD-10-CM

## 2021-04-23 DIAGNOSIS — Z20.822 ENCOUNTER FOR PREPROCEDURE SCREENING LABORATORY TESTING FOR COVID-19: ICD-10-CM

## 2021-04-23 LAB — SARS-COV-2 RNA RESP QL NAA+PROBE: NEGATIVE

## 2021-04-23 PROCEDURE — U0005 INFEC AGEN DETEC AMPLI PROBE: HCPCS | Performed by: INTERNAL MEDICINE

## 2021-04-23 PROCEDURE — U0003 INFECTIOUS AGENT DETECTION BY NUCLEIC ACID (DNA OR RNA); SEVERE ACUTE RESPIRATORY SYNDROME CORONAVIRUS 2 (SARS-COV-2) (CORONAVIRUS DISEASE [COVID-19]), AMPLIFIED PROBE TECHNIQUE, MAKING USE OF HIGH THROUGHPUT TECHNOLOGIES AS DESCRIBED BY CMS-2020-01-R: HCPCS | Performed by: INTERNAL MEDICINE

## 2021-04-27 ENCOUNTER — OFFICE VISIT (OUTPATIENT)
Dept: FAMILY MEDICINE CLINIC | Facility: CLINIC | Age: 56
End: 2021-04-27
Payer: MEDICARE

## 2021-04-27 VITALS
TEMPERATURE: 97.8 F | HEART RATE: 84 BPM | SYSTOLIC BLOOD PRESSURE: 146 MMHG | HEIGHT: 70 IN | OXYGEN SATURATION: 96 % | DIASTOLIC BLOOD PRESSURE: 90 MMHG | WEIGHT: 212 LBS | BODY MASS INDEX: 30.35 KG/M2

## 2021-04-27 DIAGNOSIS — Z01.818 PRE-OPERATIVE CLEARANCE: ICD-10-CM

## 2021-04-27 DIAGNOSIS — H26.9 CATARACT OF RIGHT EYE, UNSPECIFIED CATARACT TYPE: Primary | ICD-10-CM

## 2021-04-27 PROCEDURE — 99213 OFFICE O/P EST LOW 20 MIN: CPT | Performed by: FAMILY MEDICINE

## 2021-04-27 NOTE — PROGRESS NOTES
PRE-OPERATIVE EXAMINATION  Kelli ENCINAS Chrystal  1965    Rebecca Junior is a 48 y o  female with an extensive PMH including but not limited to Cataracts, COPD, GERD, CHANTALE, HTN, Thoracic Aortic Aneurysm who is planning to undergo Cataract Surgery under IV sedation on 5/19/2021  The procedure is indicated for the following condition: Right Sided Cataract  Patient has not traveled outside the [address] [place] [address] Road  within the last 14 days  had complications with anesthesia in the past     ROS:    Chest pain: no    Shortness of breath: no   Shortness of breath with exertion: Yes   Orthopnea: no   Dizziness: no   Unexplained weight change: no    PMH:   CAD: no   HTN: yes   CKD: no   DM: yes on insulin: yes   History of CVA: no    She  reports that she has been smoking cigarettes  She has a 30 00 pack-year smoking history  She has never used smokeless tobacco  She reports previous alcohol use  She reports current drug use  Drug: Marijuana  /90   Pulse 84   Temp 97 8 °F (36 6 °C) (Temporal)   Ht 5' 10" (1 778 m)   Wt 96 2 kg (212 lb)   SpO2 96%   BMI 30 42 kg/m²   Physical Exam  Vitals signs and nursing note reviewed  Constitutional:       General: She is not in acute distress  Appearance: She is well-developed  She is not ill-appearing, toxic-appearing or diaphoretic  HENT:      Head: Normocephalic and atraumatic  Eyes:      General: No scleral icterus  Right eye: No discharge  Left eye: No discharge  Conjunctiva/sclera: Conjunctivae normal       Comments: Right Sided Cataract   Neck:      Musculoskeletal: Neck supple  Cardiovascular:      Rate and Rhythm: Normal rate and regular rhythm  Heart sounds: No murmur  Pulmonary:      Effort: Pulmonary effort is normal  No respiratory distress  Breath sounds: Normal breath sounds  Abdominal:      Palpations: Abdomen is soft  Tenderness: There is no abdominal tenderness  Skin:     General: Skin is warm and dry  Neurological:      Mental Status: She is alert  Revised Cardiac Risk Index (RCRI) for Pre-Operative Risk   (estimates risk of cardiac complications after noncardiac surgery)    · High-risk surgery: No 0 / Yes +1  · Intraperitoneal, intrathoracic, suprainguinal vascular  · History of ischemic heart disease: No 0 / Yes +1  · Hx of MI, (+) exercise test, current chest pain considered due to myocardial ischemia, use of nitrate therapy or ECG with pathological Q waves)  · History of CHF: No 0 / Yes +1  · Pulmonary edema, B/L rales or S3 gallop; CONNOLLY, orthopnea, PND, CXR showing pulmonary vascular redistribution)  · History of cerebrovascular disease: No 0 / Yes +1  · Prior TIA or stroke  · Pre-operative treatment with insulin: No 0 / Yes +1  · Pre-operative creatinine >2 mg/dL: No 0 / Yes +1    RCRI Scoring:  · 0 points: Class I Risk, 3 9% 30-day risk of death, MI, or cardiac arrest  · 1 point: Class II Risk, 6 0% 30-day risk of death, MI, or cardiac arrest  · 2 points: Class III Risk, 10 1% 30-day risk of death, MI, or cardiac arrest  · 3 points: Class IV Risk, 15% 30-day risk of death, MI, or cardiac arrest  · 4 points: Class IV Risk, 15% 30-day risk of death, MI, or cardiac arrest  · 5 points: Class IV Risk, 15% 30-day risk of death, MI, or cardiac arrest  · 6 points: Class IV Risk, 15% 30-day risk of death, MI, or cardiac arrest    Lab Results   Component Value Date    CREATININE 0 62 04/18/2021       There are no diagnoses linked to this encounter  Recommendations:  Kelli Jarrell is undergoing an elective Low Risk surgery  She is RCRI class II risk (1 points for Treatment with Insulin) with 6% 30-day risk of death, MI, or cardiac arrest  She may proceed with surgery as planned without further workup  Pre-operative form completed and faxed today to office as requested  Discussed with Dr Nereida Redmond, who is in agreement with the plan as outlined above

## 2021-04-30 ENCOUNTER — OFFICE VISIT (OUTPATIENT)
Dept: CARDIOLOGY CLINIC | Facility: CLINIC | Age: 56
End: 2021-04-30
Payer: MEDICARE

## 2021-04-30 VITALS
BODY MASS INDEX: 30.21 KG/M2 | SYSTOLIC BLOOD PRESSURE: 180 MMHG | OXYGEN SATURATION: 97 % | HEIGHT: 70 IN | DIASTOLIC BLOOD PRESSURE: 92 MMHG | HEART RATE: 96 BPM | WEIGHT: 211 LBS

## 2021-04-30 DIAGNOSIS — I71.2 THORACIC AORTIC ANEURYSM WITHOUT RUPTURE (HCC): ICD-10-CM

## 2021-04-30 DIAGNOSIS — I10 UNCONTROLLED HYPERTENSION: Primary | ICD-10-CM

## 2021-04-30 DIAGNOSIS — Z72.0 TOBACCO ABUSE: ICD-10-CM

## 2021-04-30 DIAGNOSIS — I10 HYPERTENSION, UNSPECIFIED TYPE: ICD-10-CM

## 2021-04-30 PROCEDURE — 99214 OFFICE O/P EST MOD 30 MIN: CPT | Performed by: INTERNAL MEDICINE

## 2021-04-30 RX ORDER — DOXAZOSIN 8 MG/1
8 TABLET ORAL DAILY
Qty: 90 TABLET | Refills: 3 | Status: SHIPPED | OUTPATIENT
Start: 2021-04-30 | End: 2021-09-07 | Stop reason: ALTCHOICE

## 2021-04-30 NOTE — PROGRESS NOTES
Cardiology Follow up    Chester Contreras  [id number]  1965  [address] [facility]  [address] [address] [place] [id number]      There are no diagnoses linked to this encounter  I had the pleasure of seeing Chester Contreras for a follow up    History of the Presenting Illness, Discussion/Summary and my Plan are as follows:::    Jackie Ramirez is a pleasant 80-year-old with history of hypertension, diabetes, dyslipidemia and chronic and ongoing tobacco use-about 30 pack years  She also has chronic and now severe back pain  Also has a lot of stress in life and has been unable to quit smoking completely  She has had a history of thoracic aortic ectasia/aneurysms measuring about 4 2-4 3 cm since 2018-June 2020  Overall these have been relatively stable in size  Blood pressures have been poorly controlled and is on multiple antihypertensive medications  She has been ruled out for hyperaldosteronism and is back on spironolactone  Was also prescribed clonidine by Jovani Yanez about an year ago, has not used it  She gets palpitations when she is very stressed out but denies any chest pains  Has mild lower extremity edema  She misses the medication at the most once a week  Has severe back pain at this time  Plan: Aortic aneurysm/aortic ectasia:  Overall similar in size-4 2-4 3 cm-since 2018-last test-June 2020 and Dobu stress echo -  Dec 2021  Next imaging in June 2022-will try MRA to avoid radiation  Continue statin  Smoking cessation was advised  Will also need better blood pressure control long-term  See below  Hypertension:  Still poorly controlled, on at least 6 medications  Negative for renal artery stenosis-2018, negative for hyperaldosteronism-2021  Normal TSH-June 2020  Will increase Cardura to 8 mg daily  She is not yet taking her clonidine    Mainly I will have her check her blood pressures at home since she is in significant pain at this time  Can further increase hydralazine to 100 t i d  As necessary   Will keep us posted to portal blood pressures    Dyslipidemia:  Controlled on statin  Palpitations: Only when she is very stressed out, prior testing has included an echocardiogram-2017 that was unremarkable, EKGs have chronically demonstrated inferior T-wave inversions  Negative dobutamine stress echocardiogram for ischemia-February 2021    Follow-up in about 6 months  Results for Jonny Pinto (MRN [id number]) as of 7/6/2020 15:28   Ref  Range 5/26/2020 08:34   Hemoglobin A1C  12 4 (H)   Cholesterol Latest Ref Range: 50 - 200 mg/dL 143   Triglycerides Latest Ref Range: <=150 mg/dL 157 (H)   HDL Latest Ref Range: >=40 mg/dL 44   Non-HDL Cholesterol Latest Units: mg/dl 99   LDL Calculated Latest Ref Range: 0 - 100 mg/dL 68       No diagnosis found    Patient Active Problem List   Diagnosis    Anxiety    Cardiac murmur    Carpal tunnel syndrome of left wrist    Change in bowel habit    Chronic pain disorder    Cough    Depression, recurrent ([address] )    Retinal hemorrhage due to secondary diabetes ([address] )    Diabetic peripheral neuropathy (HCC)    Dizziness    Uncontrolled hypertension    Fibromyalgia    Gastroesophageal reflux disease with esophagitis    Hemangioma of bone    Hyperlipidemia associated with type 2 diabetes mellitus ([address] )    Hypertension    Hypoestrogenism    Low back pain    Lumbar radiculopathy    Medically complex patient    Neuropathy    Non compliance with medical treatment    Noncompliance with diet and medication regimen    Overweight    Pancreatitis    Primary insomnia    Right-sided thoracic back pain    Sciatica of left side    Screening for colon cancer    Shingles    Thoracic radiculitis    Tobacco abuse    Uncontrolled type 2 diabetes mellitus with complication, with long-term current use of insulin (HCC)    Vertebral body hemangioma    Vertigo    Vaginal discharge    Yeast vaginitis    Recurrent vaginitis    Abnormal urinalysis    Thoracic aortic aneurysm without rupture (HCC)    Epigastric pain    Urinary tract infection with hematuria    Bacterial vaginosis    Abscess    Palpitations    Nasal vestibulitis    Orthopnea    CHANTALE (obstructive sleep apnea)    Diarrhea     Past Medical History:   Diagnosis Date    Anxiety     Aortic aneurysm (HCC)     Arthritis     Depression     Diabetes mellitus (HCC)     Fibromyalgia     GERD (gastroesophageal reflux disease)     GERD (gastroesophageal reflux disease)     H/O cardiovascular stress test 09/2018    no ischemia  EF 70%   H/O echocardiogram 01/2019    EF 60%  Mild LVH  trivial effusion   Hyperlipidemia     Hypertension     Migraines     Psychiatric disorder     anxiety     Social History     Socioeconomic History    Marital status: Legally      Spouse name: Not on file    Number of children: Not on file    Years of education: Not on file    Highest education level: Not on file   Occupational History    Not on file   Social Needs    Financial resource strain: Not on file    Food insecurity     Worry: Not on file     Inability: Not on file    Transportation needs     Medical: Not on file     Non-medical: Not on file   Tobacco Use    Smoking status: Current Every Day Smoker     Packs/day: 1 00     Years: 30 00     Pack years: 30 00     Types: Cigarettes    Smokeless tobacco: Never Used   Substance and Sexual Activity    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       Drug use: Yes     Types: Marijuana     Comment: medical; seldom use    Sexual activity: Yes     Birth control/protection: Female Sterilization     Comment: Monogamous relationship with monogamous partner   Lifestyle    Physical activity     Days per week: Not on file     Minutes per session: Not on file    Stress: Not on file   Relationships    Social connections Talks on phone: Not on file     Gets together: Not on file     Attends Zoroastrianism service: Not on file     Active member of club or organization: Not on file     Attends meetings of clubs or organizations: Not on file     Relationship status: Not on file    Intimate partner violence     Fear of current or ex partner: Not on file     Emotionally abused: Not on file     Physically abused: Not on file     Forced sexual activity: Not on file   Other Topics Concern    Not on file   Social History Narrative    Most recent tobacco use screenin2019    Do you currently or have you served in [employer]: No    Were you activated, into active duty, as a member of the [employer] or as a Reservist: No    Advance directive: No    Chewing tobacco: none    Alcohol intake: None    Marital status: Single    Live alone or with others: with others    Family history of heart disease:    aortic aneurysm    High cholesterol: Yes    High blood pressure: Yes    Exercise level: Heavy    Overweight: Yes    Obese: Yes    Diabetes: Yes    General stress level: High    Diet: Regular    Caffeine intake: Occasional    Guns present in home: No    Seat belts used routinely: Yes    Sexual orientation: Heterosexual    Sunscreen used routinely: No    Seat belt/car seat used routinely: Yes    Exercise-How often do you exercise: as tolerated    Do you have regular medical check ups: Yes    Do you have regular dental check ups: Yes    Illicit drugs-years of use:    recovery 21 years - HX of      Family History   Problem Relation Age of Onset    Hypertension Mother    Tomie Coop Arthritis Mother     Diabetes Father     Other Sister         renal cell carcinoma    Diabetes Other      Past Surgical History:   Procedure Laterality Date    BACK SURGERY      Lumbar epidural steroid injection    CARPAL TUNNEL RELEASE Left      SECTION      CHOLECYSTECTOMY      COLONOSCOPY      incomplete    HYSTERECTOMY      Total    OVARIAN CYST REMOVAL      TUBAL LIGATION         Current Outpatient Medications:     ALPRAZolam (XANAX) 0 5 mg tablet, "ONE-HALF" TABLET TWO TIMES A DAY, Disp: 90 tablet, Rfl: 1    amLODIPine (NORVASC) 10 mg tablet, Take 10 mg by mouth daily, Disp: , Rfl:     atorvastatin (LIPITOR) 40 mg tablet, Take 1 tablet (40 mg total) by mouth daily, Disp: 90 tablet, Rfl: 2    [employer] IRON 240 (27 Fe) MG tablet, TAKE 1 TABLET (240 MG TOTAL) BY MOUTH 3 (THREE) TIMES A DAY WITH MEALS, Disp: 90 tablet, Rfl: 1    cyclobenzaprine (FLEXERIL) 10 mg tablet, Take 1 tablet (10 mg total) by mouth 3 (three) times a day as needed for muscle spasms for up to 10 days, Disp: 30 tablet, Rfl: 0    diclofenac (VOLTAREN) 75 mg EC tablet, Take 1 tablet (75 mg total) by mouth 2 (two) times a day, Disp: 60 tablet, Rfl: 0    doxazosin (CARDURA) 4 mg tablet, Take 4 mg by mouth daily, Disp: , Rfl:     ergocalciferol (VITAMIN D2) 50,000 units, Take 1 capsule (50,000 Units total) by mouth 2 (two) times a week, Disp: 24 capsule, Rfl: 1    fenofibrate (TRICOR) 145 mg tablet, TAKE ONE TABLET EVERY DAY, Disp: 90 tablet, Rfl: 6    hydrALAZINE (APRESOLINE) 50 mg tablet, TAKE TWO TABLETS (100MG) TWO TIMES A DAY, Disp: 200 tablet, Rfl: 3    hydrochlorothiazide (HYDRODIURIL) 25 mg tablet, TAKE ONE TABLET EVERY DAY, Disp: 90 tablet, Rfl: 1    Insulin Pen Needle (UNIFINE PENTIPS PLUS) 31G X 6 MM MISC, 1 Device by Does not apply route 4 (four) times a day, Disp: , Rfl:     Insulin Regular Human, Conc, (HumuLIN R U-500 KwikPen) 500 units/mL CONCENTRATED U-500 injection pen, Inject 40 Units under the skin 3 (three) times a day before meals, Disp: 3 pen, Rfl: 3    Lancets MISC, 1 Device by Does not apply route 4 (four) times a day, Disp: , Rfl:     lidocaine (LIDODERM) 5 %, Apply 1 patch topically daily Remove & Discard patch within 12 hours or as directed by MD, Disp: 30 patch, Rfl: 0    lisinopril (ZESTRIL) 40 mg tablet, TAKE ONE TABLET TWO TIMES A DAY, Disp: 180 tablet, Rfl: 1    Magnesium 400 MG TABS, Take by mouth, Disp: , Rfl:     meclizine (ANTIVERT) 25 mg tablet, Take 1 tablet (25 mg total) by mouth every 8 (eight) hours as needed for dizziness, Disp: 30 tablet, Rfl: 0    metoprolol succinate (TOPROL-XL) 200 MG 24 hr tablet, TAKE 1 TABLET EVERY DAY BY ORAL ROUTE, Disp: 50 tablet, Rfl: 3    ondansetron (ZOFRAN) 4 mg tablet, Take 1 tablet (4 mg total) by mouth every 6 (six) hours, Disp: 12 tablet, Rfl: 0    pantoprazole (PROTONIX) 20 mg tablet, Take 2 tablets (40 mg total) by mouth daily, Disp: 30 tablet, Rfl: 3    pioglitazone (ACTOS) 30 mg tablet, Take 1 tablet (30 mg total) by mouth daily, Disp: 90 tablet, Rfl: 1    pregabalin (LYRICA) 75 mg capsule, TAKE ONE CAPSULE EVERY DAY, Disp: 90 capsule, Rfl: 1    spironolactone (ALDACTONE) 25 mg tablet, Take 25 mg by mouth daily , Disp: , Rfl:     TRELEGY ELLIPTA 100-62 5-25 MCG/INH inhaler, ONE PUFF EVERY DAY, Disp: 60 each, Rfl: 1    Trulicity 1 5 LB/2 3WB SOPN, INJECT 0 5 ML EVERY WEEK BY SUBCUTANEOUS ROUTE , Disp: 2 mL, Rfl: 1    zolpidem (AMBIEN) 10 mg tablet, TAKE 1 TABLET (10 MG TOTAL) BY MOUTH DAILY AT BEDTIME AS NEEDED FOR SLEEP, Disp: 90 tablet, Rfl: 0    bacitracin topical ointment 500 units/g topical ointment, Apply to bilateral nares twice per day   (Patient not taking: Reported on 4/30/2021), Disp: 15 g, Rfl: 3    ciprofloxacin-dexamethasone (CIPRODEX) otic suspension, Administer 4 drops to the right ear 2 (two) times a day (Patient not taking: Reported on 4/30/2021), Disp: 7 5 mL, Rfl: 0    cloNIDine (CATAPRES-TTS-3) 0 3 mg/24 hr, APPLY 1 PATCH EVERY WEEK BY TRANSDERMAL (Patient not taking: Reported on 4/20/2021), Disp: 12 patch, Rfl: 3    dexamethasone (DECADRON) 1 mg tablet, Take 1 tablet (1 mg total) by mouth 2 (two) times a day with meals Take tablet between 10-11pm and then have lab next day in the morning 7-9am , Disp: 1 tablet, Rfl: 0    ibuprofen (MOTRIN) 800 mg tablet, Take 1 tablet (800 mg total) by mouth every 6 (six) hours as needed for moderate pain (Patient not taking: Reported on 4/30/2021), Disp: 30 tablet, Rfl: 0    metFORMIN (GLUCOPHAGE) 1000 MG tablet, TAKE 1 TABLET TWICE A DAY BY ORAL ROUTE (Patient not taking: Reported on 4/30/2021), Disp: 180 tablet, Rfl: 3    mupirocin (BACTROBAN) 2 % nasal ointment, into each nostril 2 (two) times a day (Patient not taking: Reported on 4/30/2021), Disp: 10 g, Rfl: 0    oxyCODONE-acetaminophen (PERCOCET) 5-325 mg per tablet, Take 1 tablet by mouth every 4 (four) hours as needed for moderate pain for up to 8 dosesMax Daily Amount: 6 tablets (Patient not taking: Reported on 4/20/2021), Disp: 8 tablet, Rfl: 0  No Known Allergies  Vitals:    04/30/21 1510 04/30/21 1519 04/30/21 1533   BP: (!) 210/122 (!) 197/115 (!) 180/92   BP Location: Right arm  Left arm   Patient Position: Sitting  Sitting   Cuff Size: Standard  Large   Pulse: 96     SpO2: 97%     Weight: 95 7 kg (211 lb)     Height: 5' 10" (1 778 m)           Imaging: No results found  Review of Systems:  Review of Systems   Constitutional: Negative  HENT: Negative  Eyes: Negative  Respiratory: Negative  Cardiovascular: Negative  Endocrine: Negative  Musculoskeletal: Positive for arthralgias and back pain  Physical Exam:    BP (!) 180/92 (BP Location: Left arm, Patient Position: Sitting, Cuff Size: Large)   Pulse 96   Ht 5' 10" (1 778 m)   Wt 95 7 kg (211 lb)   SpO2 97%   BMI 30 28 kg/m²   Physical Exam  Constitutional:       General: She is not in acute distress  Appearance: She is well-developed  She is not diaphoretic  HENT:      Head: Normocephalic and atraumatic  Eyes:      General: No scleral icterus  Right eye: No discharge  Left eye: No discharge  Conjunctiva/sclera: Conjunctivae normal       Pupils: Pupils are equal, round, and reactive to light  Neck:      Musculoskeletal: Normal range of motion  Thyroid: No thyromegaly  Trachea: No tracheal deviation  Cardiovascular:      Rate and Rhythm: Normal rate and regular rhythm  Heart sounds: No murmur  No friction rub  Pulmonary:      Effort: Pulmonary effort is normal  No respiratory distress  Breath sounds: Normal breath sounds  No stridor  No wheezing  Abdominal:      General: Bowel sounds are normal  There is no distension  Palpations: Abdomen is soft  Tenderness: There is no abdominal tenderness  There is no guarding  Musculoskeletal:         General: No deformity  Skin:     General: Skin is warm  Coloration: Skin is not pale  Findings: No erythema or rash

## 2021-05-03 ENCOUNTER — HOSPITAL ENCOUNTER (OUTPATIENT)
Dept: SLEEP CENTER | Facility: CLINIC | Age: 56
Discharge: HOME | End: 2021-05-03
Payer: MEDICARE

## 2021-05-03 DIAGNOSIS — G47.33 OSA (OBSTRUCTIVE SLEEP APNEA): ICD-10-CM

## 2021-05-03 PROCEDURE — 95811 POLYSOM 6/>YRS CPAP 4/> PARM: CPT

## 2021-05-04 NOTE — PROGRESS NOTES
Sleep Study Documentation    Pre-Sleep Study       Sleep testing procedure explained to patient:YES    Patient napped prior to study:NO    Caffeine:Dayshift worker after 12PM   Caffeine use:YES- coffee  6 ounces and ice tea  12 to 26 ounces    Alcohol:Dayshift workers after 5PM: Alcohol use:NO    Typical day for patient:YES       Study Documentation    Sleep Study Indications: CHANTALE-diagnosed in-lab study, Excessive daytime sleepiness    Sleep Study: Treatment   Optimal PAP pressure: 15/11 cmH2O  Leak:Small  Snore:Eliminated  REM Obtained:yes  Supplemental O2: no    Minimum SaO2 81%  Baseline SaO2 94%  PAP mask tried (list all) [employer] Simplus full face, medium and small  PAP mask choice (final) [employer] Simplus full face, small  PAP mask type:full face  PAP pressure at which snoring was eliminated 15/11 cmH2O  Minimum SaO2 at final PAP pressure 92%  Mode of Therapy:CPAP  ETCO2:No  CPAP changed to BiPAP:Yes  If yes why due to desat without events and to test tolerance to BiPAP    Mode of Therapy:BiPAP    EKG abnormalities: no     EEG abnormalities: no    Sleep Study Recorded < 2 hours: N/A    Sleep Study Recorded > 2 hours but incomplete study: N/A    Sleep Study Recorded 6 hours but no sleep obtained: NO    Patient classification:       Post-Sleep Study    Medication used at bedtime or during sleep study:NO    Patient reports time it took to fall asleep:greater than 60 minutes    Patient reports waking up during study:Denied    Patient reports sleeping 2 to 4 hours without dreaming  Patient reports sleep during study:typical    Patient rated sleepiness: Somewhat sleepy or tired    PAP treatment:yes: Post PAP treatment patient reports feeling unchanged and would wear PAP mask at home

## 2021-05-06 ENCOUNTER — OFFICE VISIT (OUTPATIENT)
Dept: ENDOCRINOLOGY | Facility: CLINIC | Age: 56
End: 2021-05-06
Payer: MEDICARE

## 2021-05-06 VITALS
WEIGHT: 210.25 LBS | SYSTOLIC BLOOD PRESSURE: 138 MMHG | HEIGHT: 70 IN | HEART RATE: 73 BPM | DIASTOLIC BLOOD PRESSURE: 88 MMHG | BODY MASS INDEX: 30.1 KG/M2

## 2021-05-06 DIAGNOSIS — E13.319 RETINAL HEMORRHAGE DUE TO SECONDARY DIABETES (HCC): ICD-10-CM

## 2021-05-06 DIAGNOSIS — E78.5 HYPERLIPIDEMIA ASSOCIATED WITH TYPE 2 DIABETES MELLITUS (HCC): Primary | ICD-10-CM

## 2021-05-06 DIAGNOSIS — I10 UNCONTROLLED HYPERTENSION: ICD-10-CM

## 2021-05-06 DIAGNOSIS — IMO0002 UNCONTROLLED TYPE 2 DIABETES MELLITUS WITH COMPLICATION, WITH LONG-TERM CURRENT USE OF INSULIN: ICD-10-CM

## 2021-05-06 DIAGNOSIS — E11.42 DIABETIC PERIPHERAL NEUROPATHY (HCC): ICD-10-CM

## 2021-05-06 DIAGNOSIS — E11.69 HYPERLIPIDEMIA ASSOCIATED WITH TYPE 2 DIABETES MELLITUS (HCC): Primary | ICD-10-CM

## 2021-05-06 DIAGNOSIS — H35.049 RETINAL HEMORRHAGE DUE TO SECONDARY DIABETES (HCC): ICD-10-CM

## 2021-05-06 PROCEDURE — 95250 CONT GLUC MNTR PHYS/QHP EQP: CPT | Performed by: INTERNAL MEDICINE

## 2021-05-06 PROCEDURE — 99214 OFFICE O/P EST MOD 30 MIN: CPT | Performed by: INTERNAL MEDICINE

## 2021-05-06 RX ORDER — DULAGLUTIDE 3 MG/.5ML
3 INJECTION, SOLUTION SUBCUTANEOUS WEEKLY
Qty: 6 ML | Refills: 1 | Status: SHIPPED | OUTPATIENT
Start: 2021-05-06 | End: 2021-11-23 | Stop reason: HOSPADM

## 2021-05-06 NOTE — PROGRESS NOTES
Established Patient Progress Note      CC: follow up for diabetes    This is a 55 y  o  female with a history of type 2 diabetes with long-term use of insulin since 10 years ago  She reports complications of retinopathy, nephropathy (microalbuminuria) and neuropathy   She denies any history of stroke or MI  Denies recent illness or hospitalizations  She only measures her blood sugar when she feel funny  On average once a week   She feels weird when it is 300, sometimes 87       ROS: Has polyuria, polydipsia, denies chest pain, shortness of breath, numbness or tingling in her hands or feet     Current regimen:  - 40 units before meals 3x daily if she does not eat takes 20 units, trulicity 1 5 mg weekly, actos 30 mg daily  She does not tolerate metformin       Ophthalmology- 5/2021, has cataract surgery in few weeks  Podiatry-  2020     Hypertension-  Lisinopril 40 mg daily, metoprolol succinate 200 mg daily, hydrochlorothiazide 25 mg daily, amlodipine 10 mg daily, doxazosin 4 mg daily, clonidine patch, spironolactone 25 mg daily, hydralazine 100 mg BID  Hyperlipidemia- lipitor, fenofibrate   Thyroid disorders - no      Patient Active Problem List   Diagnosis    Anxiety    Cardiac murmur    Carpal tunnel syndrome of left wrist    Change in bowel habit    Chronic pain disorder    Cough    Depression, recurrent (HCC)    Retinal hemorrhage due to secondary diabetes ([address] )    Diabetic peripheral neuropathy (HCC)    Dizziness    Uncontrolled hypertension    Fibromyalgia    Gastroesophageal reflux disease with esophagitis    Hemangioma of bone    Hyperlipidemia associated with type 2 diabetes mellitus ([address] )    Hypertension    Hypoestrogenism    Low back pain    Lumbar radiculopathy    Medically complex patient    Neuropathy    Non compliance with medical treatment    Noncompliance with diet and medication regimen    Overweight    Pancreatitis    Primary insomnia    Right-sided thoracic back pain    Sciatica of left side    Screening for colon cancer    Shingles    Thoracic radiculitis    Tobacco abuse    Uncontrolled type 2 diabetes mellitus with complication, with long-term current use of insulin (HCC)    Vertebral body hemangioma    Vertigo    Vaginal discharge    Yeast vaginitis    Recurrent vaginitis    Abnormal urinalysis    Thoracic aortic aneurysm without rupture (HCC)    Epigastric pain    Urinary tract infection with hematuria    Bacterial vaginosis    Abscess    Palpitations    Nasal vestibulitis    Orthopnea    CHANTALE (obstructive sleep apnea)    Diarrhea      Past Medical History:   Diagnosis Date    Anxiety     Aortic aneurysm (HCC)     Arthritis     Depression     Diabetes mellitus (HCC)     Fibromyalgia     GERD (gastroesophageal reflux disease)     GERD (gastroesophageal reflux disease)     H/O cardiovascular stress test 2018    no ischemia  EF 70%   H/O echocardiogram 2019    EF 60%  Mild LVH  trivial effusion   Hyperlipidemia     Hypertension     Migraines     Psychiatric disorder     anxiety      Past Surgical History:   Procedure Laterality Date    BACK SURGERY      Lumbar epidural steroid injection    CARPAL TUNNEL RELEASE Left      SECTION      CHOLECYSTECTOMY      COLONOSCOPY      incomplete    HYSTERECTOMY      Total    OVARIAN CYST REMOVAL      TUBAL LIGATION        Family History   Problem Relation Age of Onset    Hypertension Mother    [facility] Ar[facility] Mother     Diabetes Father     Other Sister         renal cell carcinoma    Diabetes Other      Social History     Tobacco Use    Smoking status: Current Every Day Smoker     Packs/day: 1 00     Years: 30 00     Pack years: 30 00     Types: Cigarettes    Smokeless tobacco: Never Used   Substance Use Topics    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        Allergies   Allergen Reactions    Metformin Diarrhea    Clonidine Rash     Patch Current Outpatient Medications:     ALPRAZolam (XANAX) 0 5 mg tablet, "ONE-HALF" TABLET TWO TIMES A DAY, Disp: 90 tablet, Rfl: 1    amLODIPine (NORVASC) 10 mg tablet, Take 10 mg by mouth daily, Disp: , Rfl:     atorvastatin (LIPITOR) 40 mg tablet, Take 1 tablet (40 mg total) by mouth daily, Disp: 90 tablet, Rfl: 2    ciprofloxacin-dexamethasone (CIPRODEX) otic suspension, Administer 4 drops to the right ear 2 (two) times a day, Disp: 7 5 mL, Rfl: 0    [employer] IRON 240 (27 Fe) MG tablet, TAKE 1 TABLET (240 MG TOTAL) BY MOUTH 3 (THREE) TIMES A DAY WITH MEALS, Disp: 90 tablet, Rfl: 1    cyclobenzaprine (FLEXERIL) 10 mg tablet, Take 1 tablet (10 mg total) by mouth 3 (three) times a day as needed for muscle spasms for up to 10 days, Disp: 30 tablet, Rfl: 0    dexamethasone (DECADRON) 1 mg tablet, Take 1 tablet (1 mg total) by mouth 2 (two) times a day with meals Take tablet between 10-11pm and then have lab next day in the morning 7-9am , Disp: 1 tablet, Rfl: 0    diclofenac (VOLTAREN) 75 mg EC tablet, Take 1 tablet (75 mg total) by mouth 2 (two) times a day, Disp: 60 tablet, Rfl: 0    doxazosin (CARDURA) 8 MG tablet, Take 1 tablet (8 mg total) by mouth daily, Disp: 90 tablet, Rfl: 3    ergocalciferol (VITAMIN D2) 50,000 units, Take 1 capsule (50,000 Units total) by mouth 2 (two) times a week, Disp: 24 capsule, Rfl: 1    fenofibrate (TRICOR) 145 mg tablet, TAKE ONE TABLET EVERY DAY, Disp: 90 tablet, Rfl: 6    hydrALAZINE (APRESOLINE) 50 mg tablet, TAKE TWO TABLETS (100MG) TWO TIMES A DAY, Disp: 200 tablet, Rfl: 3    hydrochlorothiazide (HYDRODIURIL) 25 mg tablet, TAKE ONE TABLET EVERY DAY, Disp: 90 tablet, Rfl: 1    ibuprofen (MOTRIN) 800 mg tablet, Take 1 tablet (800 mg total) by mouth every 6 (six) hours as needed for moderate pain, Disp: 30 tablet, Rfl: 0    Insulin Pen Needle (UNIFINE PENTIPS PLUS) 31G X 6 MM MISC, 1 Device by Does not apply route 4 (four) times a day, Disp: , Rfl:     Insulin Regular Human, Conc, (HumuLIN R U-500 KwikPen) 500 units/mL CONCENTRATED U-500 injection pen, Inject 40 Units under the skin 3 (three) times a day before meals, Disp: 3 pen, Rfl: 3    Lancets MISC, 1 Device by Does not apply route 4 (four) times a day, Disp: , Rfl:     lidocaine (LIDODERM) 5 %, Apply 1 patch topically daily Remove & Discard patch within 12 hours or as directed by MD, Disp: 30 patch, Rfl: 0    lisinopril (ZESTRIL) 40 mg tablet, TAKE ONE TABLET TWO TIMES A DAY, Disp: 180 tablet, Rfl: 1    Magnesium 400 MG TABS, Take by mouth, Disp: , Rfl:     meclizine (ANTIVERT) 25 mg tablet, Take 1 tablet (25 mg total) by mouth every 8 (eight) hours as needed for dizziness, Disp: 30 tablet, Rfl: 0    metoprolol succinate (TOPROL-XL) 200 MG 24 hr tablet, TAKE 1 TABLET EVERY DAY BY ORAL ROUTE, Disp: 50 tablet, Rfl: 3    ondansetron (ZOFRAN) 4 mg tablet, Take 1 tablet (4 mg total) by mouth every 6 (six) hours, Disp: 12 tablet, Rfl: 0    pantoprazole (PROTONIX) 20 mg tablet, Take 2 tablets (40 mg total) by mouth daily, Disp: 30 tablet, Rfl: 3    pioglitazone (ACTOS) 30 mg tablet, Take 1 tablet (30 mg total) by mouth daily, Disp: 90 tablet, Rfl: 1    pregabalin (LYRICA) 75 mg capsule, TAKE ONE CAPSULE EVERY DAY, Disp: 90 capsule, Rfl: 1    spironolactone (ALDACTONE) 25 mg tablet, Take 25 mg by mouth daily , Disp: , Rfl:     TRELEGY ELLIPTA 100-62 5-25 MCG/INH inhaler, ONE PUFF EVERY DAY, Disp: 60 each, Rfl: 1    Trulicity 1 5 FZ/3 2OL SOPN, INJECT 0 5 ML EVERY WEEK BY SUBCUTANEOUS ROUTE , Disp: 2 mL, Rfl: 1    zolpidem (AMBIEN) 10 mg tablet, TAKE 1 TABLET (10 MG TOTAL) BY MOUTH DAILY AT BEDTIME AS NEEDED FOR SLEEP, Disp: 90 tablet, Rfl: 0    bacitracin topical ointment 500 units/g topical ointment, Apply to bilateral nares twice per day   (Patient not taking: Reported on 4/30/2021), Disp: 15 g, Rfl: 3    Blood Pressure Monitoring (Sphygmomanometer) MISC, Use 2 (two) times a day (Patient not taking: Reported on 5/6/2021), Disp: 1 each, Rfl: 0    cloNIDine (CATAPRES-TTS-3) 0 3 mg/24 hr, APPLY 1 PATCH EVERY WEEK BY TRANSDERMAL (Patient not taking: Reported on 4/20/2021), Disp: 12 patch, Rfl: 3    metFORMIN (GLUCOPHAGE) 1000 MG tablet, TAKE 1 TABLET TWICE A DAY BY ORAL ROUTE (Patient not taking: Reported on 4/30/2021), Disp: 180 tablet, Rfl: 3    mupirocin (BACTROBAN) 2 % nasal ointment, into each nostril 2 (two) times a day (Patient not taking: Reported on 4/30/2021), Disp: 10 g, Rfl: 0    oxyCODONE-acetaminophen (PERCOCET) 5-325 mg per tablet, Take 1 tablet by mouth every 4 (four) hours as needed for moderate pain for up to 8 dosesMax Daily Amount: 6 tablets (Patient not taking: Reported on 4/20/2021), Disp: 8 tablet, Rfl: 0    Review of Systems   Constitutional: Negative for chills, fatigue and fever  HENT: Negative for congestion, postnasal drip and sore throat  Eyes: Negative for pain  Respiratory: Negative for cough and shortness of breath  Cardiovascular: Negative for chest pain, palpitations and leg swelling  Gastrointestinal: Negative for abdominal pain, blood in stool, constipation, diarrhea and nausea  Endocrine: Positive for polydipsia and polyuria  Genitourinary: Negative for dysuria  Musculoskeletal: Negative for arthralgias and back pain  Neurological: Negative for dizziness, light-headedness and headaches  Psychiatric/Behavioral: Negative for behavioral problems  The patient is not nervous/anxious  All other systems reviewed and are negative  Physical Exam:  Body mass index is 30 17 kg/m²  /88 (BP Location: Left arm, Patient Position: Sitting, Cuff Size: Large)   Pulse 73   Ht 5' 10" (1 778 m)   Wt 95 4 kg (210 lb 4 oz)   BMI 30 17 kg/m²    Wt Readings from Last 3 Encounters:   05/06/21 95 4 kg (210 lb 4 oz)   04/30/21 95 7 kg (211 lb)   04/27/21 96 2 kg (212 lb)       Physical Exam  Constitutional:       Appearance: She is well-developed     HENT: Head: Normocephalic and atraumatic  Eyes:      General: No scleral icterus  Right eye: No discharge  Left eye: No discharge  Conjunctiva/sclera: Conjunctivae normal       Pupils: Pupils are equal, round, and reactive to light  Neck:      Musculoskeletal: Neck supple  Thyroid: No thyromegaly  Cardiovascular:      Rate and Rhythm: Normal rate and regular rhythm  Pulses:           Dorsalis pedis pulses are 2+ on the right side and 2+ on the left side  Posterior tibial pulses are 2+ on the right side and 2+ on the left side  Heart sounds: No murmur  Pulmonary:      Effort: Pulmonary effort is normal  No respiratory distress  Breath sounds: Normal breath sounds  Abdominal:      General: There is no distension  Palpations: Abdomen is soft  Tenderness: There is no abdominal tenderness  Feet:      Right foot:      Skin integrity: No ulcer, skin breakdown, erythema, warmth, callus or dry skin  Left foot:      Skin integrity: No ulcer, skin breakdown, erythema, warmth, callus or dry skin  Skin:     General: Skin is warm and dry  Neurological:      Mental Status: She is alert  Patient's shoes and socks removed  Right Foot/Ankle   Right Foot Inspection  Skin Exam: skin normal and skin intact no dry skin, no warmth, no callus, no erythema, no maceration, no abnormal color, no pre-ulcer, no ulcer and no callus                            Sensory   Vibration: absent  Proprioception: intact   Monofilament testing: diminished  Vascular    The right DP pulse is 2+  The right PT pulse is 2+  Left Foot/Ankle  Left Foot Inspection  Skin Exam: skin normal and skin intactno dry skin, no warmth, no erythema, no maceration, normal color, no pre-ulcer, no ulcer and no callus                                         Sensory   Vibration: absent  Proprioception: intact  Monofilament: diminished  Vascular    The left DP pulse is 2+  The left PT pulse is 2+  Assign Risk Category:  No deformity present; No loss of protective sensation;        Risk: 0      Labs:   Lab Results   Component Value Date    HGBA1C 12 9 (H) 04/02/2021    HGBA1C 12 4 (H) 05/26/2020    HGBA1C 11 7 01/20/2020     Lab Results   Component Value Date    CREATININE 0 62 04/18/2021    CREATININE 0 65 04/02/2021    CREATININE 0 72 03/08/2021    BUN 11 04/18/2021     07/19/2015    K 3 1 (L) 04/18/2021     04/18/2021    CO2 34 (H) 04/18/2021     eGFR   Date Value Ref Range Status   04/18/2021 118 ml/min/1 73sq m Final     Lab Results   Component Value Date    HDL 45 (L) 04/02/2021    TRIG 133 7 04/02/2021     Lab Results   Component Value Date    ALT 15 04/18/2021    AST 13 (L) 04/18/2021    ALKPHOS 86 3 04/18/2021    BILITOT 0 43 07/19/2015     Lab Results   Component Value Date    DDL1MPTAPNIO 1 578 06/02/2020    TSC3OUFSNTED 1 390 05/26/2020     No results found for: FREET4, TSI    Impression & Plan:    Jackie Ramirez was seen today for diabetes type 2  Diagnoses and all orders for this visit:    Uncontrolled type 2 diabetes mellitus with complication, with long-term current use of insulin ([facility] [address] ) -  Most recent A1c 12 9%, I started patient on U500 insulin  She does not measure her blood sugars, I placed on her in office - Rufus CGM  I will adjust her U500 regular insulin based on the Robi Ellis CGM report  I increased Trulicity to 3 mg weekly, continue Actos  She is not interested in SGLT 2 inhibitors because of side effect of polyuria  -     Microalbumin / creatinine urine ratio; Future  -     Comprehensive metabolic panel Lab Collect; Future  -     HEMOGLOBIN A1C W/ EAG ESTIMATION Lab Collect; Future  -     Dulaglutide (Trulicity) 3 UB/8 0XV SOPN; Inject 0 5 mL (3 mg total) under the skin once a week  -     Continous glucose monitoring rufus placement; Future  -     Continous glucose monitoring rufus intrepretation;  Future    Uncontrolled hypertension -  Secondary workup for primary hyperaldosteronism, Cushing syndrome  -     Aldosterone/Renin Ratio; Future  -     Cortisol Level, AM Specimen Lab Collect; Future    Hyperlipidemia associated with type 2 diabetes mellitus ([place] [id number] ) -  Continue current regimen, managed per primary care physician    Diabetic peripheral neuropathy ([place] [id number] ) -  I referred patient to podiatry  -     Ambulatory referral to Podiatry; Future    There are no Patient Instructions on file for this visit  Discussed with the patient and all questioned fully answered  She will call me if any problems arise  Follow-up appointment in 3 months       Counseled patient on diagnostic results, prognosis, risk and benefit of treatment options, instruction for management, importance of treatment compliance, Risk  factor reduction and impressions    Rod Martin MD

## 2021-05-06 NOTE — PROGRESS NOTES
Continous glucose monitoring rufus placement     Date/Time 5/6/2021 11:52 AM     Performed by  Carie Song     Authorized by Sasha Gallego MD      Universal Protocol   Consent: Verbal consent obtained  Written consent obtained    Consent given by: patient  Timeout called at: 5/6/2021 11:52 AM   Patient understanding: patient states understanding of the procedure being performed  Patient consent: the patient's understanding of the procedure matches consent given  Procedure consent: procedure consent matches procedure scheduled  Relevant documents: relevant documents present and verified  Test results: test results available and properly labeled  Site marked: the operative site was marked  Patient identity confirmed: verbally with patient        Local anesthesia used: no     Anesthesia   Local anesthesia used: no     Sedation   Patient sedated: no        Specimen: no    Culture: no   Procedure Details   Procedure Notes: FreeStyle Rufus placement today  Serial # [id number]  Exp 09/30/2021  Patient tolerance: patient tolerated the procedure well with no immediate complications

## 2021-05-08 DIAGNOSIS — G47.33 OSA (OBSTRUCTIVE SLEEP APNEA): Primary | ICD-10-CM

## 2021-05-13 ENCOUNTER — TELEPHONE (OUTPATIENT)
Dept: SLEEP CENTER | Facility: CLINIC | Age: 56
End: 2021-05-13

## 2021-05-13 NOTE — TELEPHONE ENCOUNTER
Advised patient sleep study resulted and BIPAP ordered  Patient scheduled for DME set up and compliance appointments    Appointment information emailed to Hector Blanchard

## 2021-05-17 ENCOUNTER — TELEPHONE (OUTPATIENT)
Dept: ENDOCRINOLOGY | Facility: CLINIC | Age: 56
End: 2021-05-17

## 2021-05-17 ENCOUNTER — OFFICE VISIT (OUTPATIENT)
Dept: FAMILY MEDICINE CLINIC | Facility: CLINIC | Age: 56
End: 2021-05-17
Payer: MEDICARE

## 2021-05-17 DIAGNOSIS — Z72.0 TOBACCO ABUSE: ICD-10-CM

## 2021-05-17 DIAGNOSIS — K21.00 GASTROESOPHAGEAL REFLUX DISEASE WITH ESOPHAGITIS: ICD-10-CM

## 2021-05-17 DIAGNOSIS — M65.321 TRIGGER INDEX FINGER OF RIGHT HAND: ICD-10-CM

## 2021-05-17 DIAGNOSIS — K21.00 GASTROESOPHAGEAL REFLUX DISEASE WITH ESOPHAGITIS: Primary | ICD-10-CM

## 2021-05-17 DIAGNOSIS — IMO0002 UNCONTROLLED TYPE 2 DIABETES MELLITUS WITH COMPLICATION, WITH LONG-TERM CURRENT USE OF INSULIN: Primary | ICD-10-CM

## 2021-05-17 PROCEDURE — 99213 OFFICE O/P EST LOW 20 MIN: CPT | Performed by: FAMILY MEDICINE

## 2021-05-17 RX ORDER — PANTOPRAZOLE SODIUM 20 MG/1
40 TABLET, DELAYED RELEASE ORAL DAILY
Qty: 60 TABLET | Refills: 1 | Status: SHIPPED | OUTPATIENT
Start: 2021-05-17 | End: 2021-07-07

## 2021-05-17 RX ORDER — FLUTICASONE FUROATE, UMECLIDINIUM BROMIDE AND VILANTEROL TRIFENATATE 100; 62.5; 25 UG/1; UG/1; UG/1
1 POWDER RESPIRATORY (INHALATION) DAILY
Qty: 60 EACH | Refills: 1 | Status: SHIPPED | OUTPATIENT
Start: 2021-05-17 | End: 2021-11-23 | Stop reason: HOSPADM

## 2021-05-17 NOTE — PATIENT INSTRUCTIONS
How to Stop Smoking   AMBULATORY CARE:   You will improve your health and the health of others around you  if you stop smoking  Your risk for heart and lung disease, cancer, stroke, heart attack, and vision problems will also decrease  Your adolescent can help prevent or stop harm to his or her brain or body  This will help him or her become a healthy adult  You can benefit from quitting no matter how long you have smoked  Prepare to stop smoking:  Nicotine is a highly addictive drug found in cigarettes  Withdrawal symptoms can happen when you stop smoking and make it hard to quit  These include anxiety, depression, irritability, trouble sleeping, and increased appetite  You increase your chances of success if you prepare to quit  · Set a quit date  Uma Hussein a date that is within the next 2 weeks  Do not pick a day that you think may be stressful or busy  Write down the day or [place] it on your calender  · Tell friends and family that you plan to quit  Explain that you may have withdrawal symptoms when you try to quit  Ask them to support you  They may be able to encourage you and help reduce your stress to make it easier for you to quit  · Make a list of your reasons for quitting  Put the list somewhere you will see it every day, such as your refrigerator  You can look at the list when you have a craving  · Remove all tobacco and nicotine products from your home, car, and workplace  Also, remove anything else that will tempt you to smoke, such as lighters, matches, or ashtrays  Clean your car, home, and places at work that smell like smoke  The smell of smoke can trigger a craving  · Identify triggers that make you want to smoke  This may include activities, feelings, or people  Also write down 1 way you can deal with each of your triggers  For example, if you want to smoke as soon as you wake up, plan another activity during this time, such as exercise  · Make a plan for how you will quit  Learn about the tools that can help you quit, such as medicine, counseling, or nicotine replacement therapy  Choose at least 2 options to help you quit  · Help your adolescent make a plan to quit  The plan will be more successful if your adolescent makes his or her own decisions  Do not try to pressure him or her to quit immediately or in a certain way  Be supportive and offer help if needed  Tools to help you stop smoking:   · Counseling  from a trained healthcare provider can provide you with support and skills to quit smoking  The provider will also teach you to manage your withdrawal symptoms and cravings  You may receive counseling from one counselor, in group therapy, or through phone therapy called a quit line  · Nicotine replacement therapy (NRT)  such as nicotine patches, gum, or lozenges may help reduce your nicotine cravings  You may get these without a doctor's order  Do not use e-cigarettes or smokeless tobacco in place of cigarettes or to help you quit  They still contain nicotine  · Prescription medicines  such as nasal sprays or nicotine inhalers may help reduce your withdrawal symptoms  Other medicines may also be used to reduce your urge to smoke  Ask your healthcare provider about these medicines  You may need to start certain medicines 2 weeks before your quit date for them to work well  · Hypnosis  is a practice that helps guide you through thoughts and feelings  Hypnosis may help decrease your cravings and make you more willing to quit  · Acupuncture therapy  uses very thin needles to balance energy channels in the body  This is thought to help decrease cravings and symptoms of nicotine withdrawal     · Support groups  let you talk to others who are trying to quit or have already quit  It may be helpful to speak with others about how they quit  Manage your cravings:   · Avoid situations, people, and places that tempt you to smoke    Go to nonsmoking places, such as libraries or restaurants  Understand what tempts you and try to avoid these things  · Keep your hands busy  Hold things such as a stress ball or pen  · Put candy or toothpicks in your mouth  Keep lollipops, sugarless gum, or toothpicks with you at all times  · Do not have alcohol or caffeine  These drinks may tempt you to smoke  Drink healthy liquids such as water or juice instead  · Reward yourself when you resist your cravings  Rewards will motivate you and help you stay positive  · Do an activity that distracts you from your craving  Examples include cleaning, creating art, or gardening  Prevent weight gain after you quit:  You may gain a few pounds after you quit smoking  It is healthier for you to gain a few pounds than to continue to smoke  The following can help you prevent weight gain:  · Eat healthy foods  These include fruits, vegetables, whole-grain breads, low-fat dairy products, beans, lean meats, and fish  Eat healthy snacks, such as low-fat yogurt, if you get hungry between meals  · Drink water before, during, and between meals  This will make your stomach feel full and help prevent you from overeating  Ask your healthcare provider how much liquid to drink each day and which liquids are best for you  · Be physically active  Activity may help reduce your cravings and reduce stress  Take a walk or do some kind of physical activity every day  Ask your healthcare provider which activities are right for you  For more support and information:   · Smokefree  gov  Phone: 6- 918 - 280-0594  Web Address: www smokefrBeijing [employer]  [address] Information is for End User's use only and may not be sold, redistributed or otherwise used for commercial purposes  All illustrations and images included in CareNotes® are the copyrighted property of A D A M , Inc  or [facility] [employer]   The above information is an  only   It is not intended as medical advice for individual conditions or treatments  Talk to your doctor, nurse or pharmacist before following any medical regimen to see if it is safe and effective for you

## 2021-05-17 NOTE — PROGRESS NOTES
Patient with great variations of blood sugars per log  I am referring her to diabetic educator for carb counting, consistent intake of carbs with meals  - continue current dose of insulin  - send another log of sugars in 2 weeks

## 2021-05-17 NOTE — PROGRESS NOTES
Assessment/Plan:     Problem List Items Addressed This Visit        Digestive    Gastroesophageal reflux disease with esophagitis - Primary     -Refilled PPI today per patient request  -Has GI appointment mid-July  -Discussed "Anti-reflux" measures:    -Raise the head of the bed 4 to 6 inches   -Avoid excess coffee, tea or other caffeinated beverages   -Avoid spicy or acidic foods, avoid peppermint, chocolate   -Minimize eating within 2 hours of bedtime   -Avoid garments that fit tightly through the abdomen            Other    Tobacco abuse     -Tobacco Cessation Counseling: Tobacco cessation counseling and education was provided  The patient is sincerely urged to quit consumption of tobacco  She is not ready to quit tobacco  The numerous health risks of tobacco consumption were discussed  If she decides to quit, there are a number of helpful adjunctive aids, and she can see me to discuss nicotine replacement therapy, chantix, or bupropion anytime in the future  -Refilled trelegy for COPD         Relevant Medications    fluticasone-umeclidinium-vilanterol (Trelegy Ellipta) 100-62 5-25 MCG/INH inhaler      Other Visit Diagnoses     Trigger index finger of right hand        Relevant Orders    Ambulatory referral to Hand Surgery            Subjective:      Patient ID: Ian Cardenas is a 48 y o  female  HPI    Patient here for follow up; saw Cardiology 4/30/21, who increased her Cardura to 8 mg QD and is having her check BP at home and return in 6 months  Last HbA1C 12 9% 4/2/21, follows with Endocrinology and on Ru500 40units with meals (20units if not eating), pioglitzaton 30mg daily and trulcity 3mg weekly  Had CGM freestyle scar placed 5/6/21 by Endocrinology as well, but she took it off after 1 week due to irritation of the skin  Planning to do 1 mg overnight dexamethasone suppresion test  Has CHANTALE and follows with Sleep Medicine, currently on BiPAP 15/11 cm   Has Dr Yancy LAIRD eye surgery for cataracts this week  Taking lyrica from [facility], who apparently won't give opiates due to negative urine 11/2019, but she is planning to follow up with them  Needs refill on protonix, using mylanta/TUMs, has GI appt 7/13/21  Would like to see hand surgery/ortho for pain in her hands and trigger finger of R hand index finger  Still smoking, pre-contemplative of cessation  The following portions of the patient's history were reviewed and updated as appropriate: allergies, current medications, past family history, past medical history, past social history, past surgical history and problem list     Review of Systems   Constitutional: Negative for chills and fever  Eyes: Positive for visual disturbance  Respiratory: Negative for chest tightness and shortness of breath  Needs trelegy refill   Cardiovascular: Negative for chest pain, palpitations and leg swelling  Gastrointestinal: Negative for abdominal pain, diarrhea, nausea and vomiting  Genitourinary: Negative for dysuria  Musculoskeletal: Positive for arthralgias, back pain, gait problem, joint swelling and myalgias  Psychiatric/Behavioral: Negative for agitation, sleep disturbance and suicidal ideas  All other systems reviewed and are negative  Objective:      /90 (BP Location: Right arm, Patient Position: Sitting, Cuff Size: Large)   Pulse 76   Temp (!) 97 3 °F (36 3 °C)   Ht 5' 10" (1 778 m)   Wt 96 6 kg (213 lb)   SpO2 99%   BMI 30 56 kg/m²          Physical Exam  Vitals signs reviewed  Constitutional:       General: She is not in acute distress  Appearance: She is well-developed  She is obese  She is not ill-appearing  HENT:      Head: Normocephalic and atraumatic  Mouth/Throat:      Pharynx: No oropharyngeal exudate  Eyes:      General:         Right eye: No discharge  Left eye: No discharge  Neck:      Musculoskeletal: Normal range of motion     Cardiovascular:      Rate and Rhythm: Normal rate and regular rhythm  Heart sounds: Normal heart sounds  Pulmonary:      Effort: Pulmonary effort is normal  No respiratory distress  Breath sounds: Normal breath sounds  Abdominal:      General: Bowel sounds are normal  There is no distension  Palpations: Abdomen is soft  Tenderness: There is no abdominal tenderness  Musculoskeletal: Normal range of motion  General: Tenderness and deformity present  Comments: Tenderness globally over all areas of back   Skin:     General: Skin is warm and dry  Capillary Refill: Capillary refill takes less than 2 seconds  Neurological:      General: No focal deficit present  Mental Status: She is alert and oriented to person, place, and time  Mental status is at baseline     Psychiatric:         Mood and Affect: Mood normal          Behavior: Behavior normal

## 2021-05-18 RX ORDER — OMEPRAZOLE 40 MG/1
40 CAPSULE, DELAYED RELEASE ORAL DAILY
Qty: 90 CAPSULE | Refills: 1 | Status: SHIPPED | OUTPATIENT
Start: 2021-05-18 | End: 2021-11-23 | Stop reason: HOSPADM

## 2021-05-27 ENCOUNTER — TELEPHONE (OUTPATIENT)
Dept: SLEEP CENTER | Facility: CLINIC | Age: 56
End: 2021-05-27

## 2021-05-27 NOTE — TELEPHONE ENCOUNTER
Returned the patient call transferred to Delphine Espinoza from [facility] [place] [place]

## 2021-05-28 ENCOUNTER — TELEPHONE (OUTPATIENT)
Dept: SLEEP CENTER | Facility: CLINIC | Age: 56
End: 2021-05-28

## 2021-05-28 NOTE — TELEPHONE ENCOUNTER
Pt  was set up here in [facility] with BIPAP set 15/11cm and not given a mask she wants to use the sleep study mask

## 2021-05-29 ENCOUNTER — HOSPITAL ENCOUNTER (EMERGENCY)
Facility: HOSPITAL | Age: 56
Discharge: HOME | End: 2021-05-29
Payer: MEDICARE

## 2021-05-29 VITALS
HEART RATE: 87 BPM | OXYGEN SATURATION: 99 % | BODY MASS INDEX: 30.13 KG/M2 | DIASTOLIC BLOOD PRESSURE: 102 MMHG | SYSTOLIC BLOOD PRESSURE: 180 MMHG | WEIGHT: 210 LBS | RESPIRATION RATE: 18 BRPM | TEMPERATURE: 98.4 F

## 2021-05-29 DIAGNOSIS — K21.9 GERD (GASTROESOPHAGEAL REFLUX DISEASE): Primary | ICD-10-CM

## 2021-05-29 LAB
ALBUMIN SERPL BCP-MCNC: 3.9 G/DL (ref 3.4–4.8)
ALP SERPL-CCNC: 88.2 U/L (ref 35–140)
ALT SERPL W P-5'-P-CCNC: 15 U/L (ref 5–54)
ANION GAP SERPL CALCULATED.3IONS-SCNC: 9 MMOL/L (ref 4–13)
AST SERPL W P-5'-P-CCNC: 14 U/L (ref 15–41)
BASOPHILS # BLD AUTO: 0.04 THOUSANDS/ΜL (ref 0–0.1)
BASOPHILS NFR BLD AUTO: 1 % (ref 0–1)
BILIRUB SERPL-MCNC: 0.55 MG/DL (ref 0.3–1.2)
BUN SERPL-MCNC: 11 MG/DL (ref 6–20)
CALCIUM SERPL-MCNC: 9.5 MG/DL (ref 8.4–10.2)
CHLORIDE SERPL-SCNC: 101 MMOL/L (ref 96–108)
CO2 SERPL-SCNC: 32 MMOL/L (ref 22–33)
CREAT SERPL-MCNC: 0.63 MG/DL (ref 0.4–1.1)
EOSINOPHIL # BLD AUTO: 0.21 THOUSAND/ΜL (ref 0–0.61)
EOSINOPHIL NFR BLD AUTO: 2 % (ref 0–6)
ERYTHROCYTE [DISTWIDTH] IN BLOOD BY AUTOMATED COUNT: 14.8 % (ref 11.6–15.1)
GFR SERPL CREATININE-BSD FRML MDRD: 118 ML/MIN/1.73SQ M
GLUCOSE SERPL-MCNC: 262 MG/DL (ref 65–140)
HCT VFR BLD AUTO: 41.1 % (ref 34.8–46.1)
HGB BLD-MCNC: 13.5 G/DL (ref 11.5–15.4)
IMM GRANULOCYTES # BLD AUTO: 0.01 THOUSAND/UL (ref 0–0.2)
IMM GRANULOCYTES NFR BLD AUTO: 0 % (ref 0–2)
LIPASE SERPL-CCNC: 49 U/L (ref 13–60)
LYMPHOCYTES # BLD AUTO: 2.68 THOUSANDS/ΜL (ref 0.6–4.47)
LYMPHOCYTES NFR BLD AUTO: 31 % (ref 14–44)
MAGNESIUM SERPL-MCNC: 1.7 MG/DL (ref 1.6–2.6)
MCH RBC QN AUTO: 27.9 PG (ref 26.8–34.3)
MCHC RBC AUTO-ENTMCNC: 32.8 G/DL (ref 31.4–37.4)
MCV RBC AUTO: 85 FL (ref 82–98)
MONOCYTES # BLD AUTO: 0.69 THOUSAND/ΜL (ref 0.17–1.22)
MONOCYTES NFR BLD AUTO: 8 % (ref 4–12)
NEUTROPHILS # BLD AUTO: 5.1 THOUSANDS/ΜL (ref 1.85–7.62)
NEUTS SEG NFR BLD AUTO: 58 % (ref 43–75)
PLATELET # BLD AUTO: 308 THOUSANDS/UL (ref 149–390)
PMV BLD AUTO: 10.8 FL (ref 8.9–12.7)
POTASSIUM SERPL-SCNC: 3.4 MMOL/L (ref 3.5–5)
PROT SERPL-MCNC: 6.9 G/DL (ref 6.4–8.3)
RBC # BLD AUTO: 4.84 MILLION/UL (ref 3.81–5.12)
SODIUM SERPL-SCNC: 142 MMOL/L (ref 133–145)
WBC # BLD AUTO: 8.73 THOUSAND/UL (ref 4.31–10.16)

## 2021-05-29 PROCEDURE — 99284 EMERGENCY DEPT VISIT MOD MDM: CPT

## 2021-05-29 PROCEDURE — 83735 ASSAY OF MAGNESIUM: CPT | Performed by: PHYSICIAN ASSISTANT

## 2021-05-29 PROCEDURE — 80053 COMPREHEN METABOLIC PANEL: CPT | Performed by: PHYSICIAN ASSISTANT

## 2021-05-29 PROCEDURE — 83690 ASSAY OF LIPASE: CPT | Performed by: PHYSICIAN ASSISTANT

## 2021-05-29 PROCEDURE — 99284 EMERGENCY DEPT VISIT MOD MDM: CPT | Performed by: PHYSICIAN ASSISTANT

## 2021-05-29 PROCEDURE — 85025 COMPLETE CBC W/AUTO DIFF WBC: CPT | Performed by: PHYSICIAN ASSISTANT

## 2021-05-29 PROCEDURE — 96375 TX/PRO/DX INJ NEW DRUG ADDON: CPT

## 2021-05-29 PROCEDURE — 96374 THER/PROPH/DIAG INJ IV PUSH: CPT

## 2021-05-29 PROCEDURE — 36415 COLL VENOUS BLD VENIPUNCTURE: CPT | Performed by: PHYSICIAN ASSISTANT

## 2021-05-29 RX ORDER — PROCHLORPERAZINE MALEATE 5 MG/1
5 TABLET ORAL ONCE
Status: COMPLETED | OUTPATIENT
Start: 2021-05-29 | End: 2021-05-29

## 2021-05-29 RX ORDER — MAGNESIUM HYDROXIDE/ALUMINUM HYDROXICE/SIMETHICONE 120; 1200; 1200 MG/30ML; MG/30ML; MG/30ML
30 SUSPENSION ORAL ONCE
Status: COMPLETED | OUTPATIENT
Start: 2021-05-29 | End: 2021-05-29

## 2021-05-29 RX ORDER — FAMOTIDINE 20 MG/1
20 TABLET, FILM COATED ORAL 2 TIMES DAILY
Qty: 30 TABLET | Refills: 0 | Status: SHIPPED | OUTPATIENT
Start: 2021-05-29 | End: 2021-09-02 | Stop reason: ALTCHOICE

## 2021-05-29 RX ORDER — ALUMINUM HYDROXIDE, MAGNESIUM HYDROXIDE, SIMETHICONE 400; 400; 40 MG/10ML; MG/10ML; MG/10ML
20 SUSPENSION ORAL
Qty: 355 ML | Refills: 0 | Status: SHIPPED | OUTPATIENT
Start: 2021-05-29 | End: 2021-11-23 | Stop reason: HOSPADM

## 2021-05-29 RX ORDER — LIDOCAINE HYDROCHLORIDE 20 MG/ML
15 SOLUTION OROPHARYNGEAL 4 TIMES DAILY PRN
Qty: 100 ML | Refills: 0 | Status: SHIPPED | OUTPATIENT
Start: 2021-05-29 | End: 2021-09-07 | Stop reason: ALTCHOICE

## 2021-05-29 RX ORDER — ONDANSETRON 2 MG/ML
4 INJECTION INTRAMUSCULAR; INTRAVENOUS ONCE
Status: COMPLETED | OUTPATIENT
Start: 2021-05-29 | End: 2021-05-29

## 2021-05-29 RX ORDER — LIDOCAINE HYDROCHLORIDE 20 MG/ML
15 SOLUTION OROPHARYNGEAL ONCE
Status: COMPLETED | OUTPATIENT
Start: 2021-05-29 | End: 2021-05-29

## 2021-05-29 RX ORDER — ONDANSETRON 4 MG/1
4 TABLET, ORALLY DISINTEGRATING ORAL EVERY 6 HOURS PRN
Qty: 20 TABLET | Refills: 0 | Status: SHIPPED | OUTPATIENT
Start: 2021-05-29 | End: 2021-11-23 | Stop reason: HOSPADM

## 2021-05-29 RX ADMIN — LIDOCAINE HYDROCHLORIDE 15 ML: 20 SOLUTION ORAL; TOPICAL at 14:24

## 2021-05-29 RX ADMIN — ALUMINA, MAGNESIA, AND SIMETHICONE ORAL SUSPENSION REGULAR STRENGTH 30 ML: 1200; 1200; 120 SUSPENSION ORAL at 14:24

## 2021-05-29 RX ADMIN — PROCHLORPERAZINE MALEATE 5 MG: 5 TABLET ORAL at 15:39

## 2021-05-29 RX ADMIN — FAMOTIDINE 20 MG: 10 INJECTION, SOLUTION INTRAVENOUS at 14:27

## 2021-05-29 RX ADMIN — ONDANSETRON 4 MG: 2 INJECTION INTRAMUSCULAR; INTRAVENOUS at 14:26

## 2021-05-29 NOTE — ED PROVIDER NOTES
History  Chief Complaint   Patient presents with    Nausea     pt  states she has ongoing GERD that is severe  taking medication for this and diet control   waiting for an appointment with gastro  in July   Diarrhea     60-year-old female with history of GERD comes in today complaining of worsening of her GERD type symptoms since yesterday  She reports that this morning she also began having very watery diarrhea  She has had less than 10 episodes today  She denies any blood in her stool  She reports yesterday she ate breaded shrimp that she heated in the microwave  She tells me that she has an appointment with Gastroenterology, however it is not until July  She reports she has been taking her Protonix without relief  She has epigastric pain at this time that is currently a 6/10  She has tried "bottles of Maalox" at home without relief  She smokes and drinks 1 cup of coffee daily  Plans to f/u with GI, but earliest appt was in July  Prior to Admission Medications   Prescriptions Last Dose Informant Patient Reported? Taking?    ALPRAZolam (XANAX) 0 5 mg tablet Past Week at Unknown time Self No Yes   Sig: "ONE-HALF" TABLET TWO TIMES A DAY   Blood Pressure Monitoring (Sphygmomanometer) MISC 2021 at Unknown time Self No Yes   Sig: Use 2 (two) times a day   [facility] IRON 240 (27 Fe) MG tablet 2021 at Unknown time Self No Yes   Sig: TAKE 1 TABLET (240 MG TOTAL) BY MOUTH 3 (THREE) TIMES A DAY WITH MEALS   Dulaglutide (Trulicity) 3 GD/7 0ZM SOPN Past Week at Unknown time Self No Yes   Sig: Inject 0 5 mL (3 mg total) under the skin once a week   Insulin Pen Needle (UNIFINE PENTIPS PLUS) 31G X 6 MM MISC 2021 at Unknown time Self Yes Yes   Si Device by Does not apply route 4 (four) times a day   Insulin Regular Human, Conc, (HumuLIN R U-500 KwikPen) 500 units/mL CONCENTRATED U-500 injection pen 2021 at Unknown time Self No Yes   Sig: Inject 40 Units under the skin 3 (three) times a day before meals   Lancets MISC 2021 at Unknown time Self Yes Yes   Si Device by Does not apply route 4 (four) times a day   Magnesium 400 MG TABS 2021 at Unknown time Self Yes Yes   Sig: Take by mouth   amLODIPine (NORVASC) 10 mg tablet 2021 at Unknown time Self Yes Yes   Sig: Take 10 mg by mouth daily   atorvastatin (LIPITOR) 40 mg tablet 2021 at Unknown time Self No Yes   Sig: Take 1 tablet (40 mg total) by mouth daily   bacitracin topical ointment 500 units/g topical ointment Not Taking at Unknown time Self No No   Sig: Apply to bilateral nares twice per day     Patient not taking: Reported on 2021   ciprofloxacin-dexamethasone (CIPRODEX) otic suspension Not Taking at Unknown time Self No No   Sig: Administer 4 drops to the right ear 2 (two) times a day   Patient not taking: Reported on 2021   cloNIDine (CATAPRES-TTS-3) 0 3 mg/24 hr Not Taking at Unknown time Self No No   Sig: APPLY 1 PATCH EVERY WEEK BY TRANSDERMAL   Patient not taking: Reported on 2021   cyclobenzaprine (FLEXERIL) 10 mg tablet 2021 at Unknown time Self No Yes   Sig: Take 1 tablet (10 mg total) by mouth 3 (three) times a day as needed for muscle spasms for up to 10 days   dexamethasone (DECADRON) 1 mg tablet 2021 at Unknown time Self No Yes   Sig: Take 1 tablet (1 mg total) by mouth 2 (two) times a day with meals Take tablet between 10-11pm and then have lab next day in the morning 7-9am    diclofenac (VOLTAREN) 75 mg EC tablet 2021 at Unknown time Self No Yes   Sig: Take 1 tablet (75 mg total) by mouth 2 (two) times a day   doxazosin (CARDURA) 8 MG tablet 2021 at Unknown time Self No Yes   Sig: Take 1 tablet (8 mg total) by mouth daily   ergocalciferol (VITAMIN D2) 50,000 units 2021 at Unknown time Self No Yes   Sig: Take 1 capsule (50,000 Units total) by mouth 2 (two) times a week   fenofibrate (TRICOR) 145 mg tablet 2021 at Unknown time Self No Yes   Sig: TAKE ONE TABLET EVERY DAY   fluticasone-umeclidinium-vilanterol (Trelegy Ellipta) 100-62 5-25 MCG/INH inhaler 5/29/2021 at Unknown time  No Yes   Sig: Inhale 1 puff daily Rinse mouth after use    hydrALAZINE (APRESOLINE) 50 mg tablet 5/29/2021 at Unknown time Self No Yes   Sig: TAKE TWO TABLETS (100MG) TWO TIMES A DAY   hydrochlorothiazide (HYDRODIURIL) 25 mg tablet 5/29/2021 at Unknown time Self No Yes   Sig: TAKE ONE TABLET EVERY DAY   ibuprofen (MOTRIN) 800 mg tablet Past Week at Unknown time Self No Yes   Sig: Take 1 tablet (800 mg total) by mouth every 6 (six) hours as needed for moderate pain   lidocaine (LIDODERM) 5 % 5/28/2021 at Unknown time Self No Yes   Sig: Apply 1 patch topically daily Remove & Discard patch within 12 hours or as directed by MD   lisinopril (ZESTRIL) 40 mg tablet 5/29/2021 at Unknown time Self No Yes   Sig: TAKE ONE TABLET TWO TIMES A DAY   meclizine (ANTIVERT) 25 mg tablet Past Week at Unknown time Self No Yes   Sig: Take 1 tablet (25 mg total) by mouth every 8 (eight) hours as needed for dizziness   metoprolol succinate (TOPROL-XL) 200 MG 24 hr tablet 5/29/2021 at Unknown time Self No Yes   Sig: TAKE 1 TABLET EVERY DAY BY ORAL ROUTE   mupirocin (BACTROBAN) 2 % nasal ointment Not Taking at Unknown time Self No No   Sig: into each nostril 2 (two) times a day   Patient not taking: Reported on 4/30/2021   omeprazole (PriLOSEC) 40 MG capsule 5/29/2021 at Unknown time  No Yes   Sig: Take 1 capsule (40 mg total) by mouth daily   ondansetron (ZOFRAN) 4 mg tablet Not Taking at Unknown time Self No No   Sig: Take 1 tablet (4 mg total) by mouth every 6 (six) hours   Patient not taking: Reported on 5/29/2021   oxyCODONE-acetaminophen (PERCOCET) 5-325 mg per tablet Not Taking at Unknown time Self No No   Sig: Take 1 tablet by mouth every 4 (four) hours as needed for moderate pain for up to 8 dosesMax Daily Amount: 6 tablets   Patient not taking: Reported on 4/20/2021   pioglitazone (ACTOS) 30 mg tablet 5/29/2021 at Unknown time Self No Yes   Sig: Take 1 tablet (30 mg total) by mouth daily   pregabalin (LYRICA) 75 mg capsule 2021 at Unknown time Self No Yes   Sig: TAKE ONE CAPSULE EVERY DAY   spironolactone (ALDACTONE) 25 mg tablet 2021 at Unknown time Self Yes Yes   Sig: Take 25 mg by mouth daily    zolpidem (AMBIEN) 10 mg tablet 2021 at Unknown time Self No Yes   Sig: TAKE 1 TABLET (10 MG TOTAL) BY MOUTH DAILY AT BEDTIME AS NEEDED FOR SLEEP      Facility-Administered Medications: None       Past Medical History:   Diagnosis Date    Anxiety     Aortic aneurysm (HCC)     Arthritis     Depression     Diabetes mellitus (HCC)     Fibromyalgia     GERD (gastroesophageal reflux disease)     GERD (gastroesophageal reflux disease)     H/O cardiovascular stress test 2018    no ischemia  EF 70%   H/O echocardiogram 2019    EF 60%  Mild LVH  trivial effusion   Hyperlipidemia     Hypertension     Migraines     Psychiatric disorder     anxiety       Past Surgical History:   Procedure Laterality Date    BACK SURGERY      Lumbar epidural steroid injection    CARPAL TUNNEL RELEASE Left      SECTION      CHOLECYSTECTOMY      COLONOSCOPY      incomplete    HYSTERECTOMY      Total    OVARIAN CYST REMOVAL      TUBAL LIGATION         Family History   Problem Relation Age of Onset    Hypertension Mother    Dedrick Gomez Arthritis Mother     Diabetes Father     Other Sister         renal cell carcinoma    Diabetes Other      I have reviewed and agree with the history as documented      E-Cigarette/Vaping    E-Cigarette Use Never User      E-Cigarette/Vaping Substances    Nicotine No     THC No     CBD No     Flavoring No     Other No     Unknown No      Social History     Tobacco Use    Smoking status: Current Every Day Smoker     Packs/day: 1 00     Years: 30 00     Pack years: 30 00     Types: Cigarettes    Smokeless tobacco: Never Used   Substance Use Topics    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   Drug use: Yes     Types: Marijuana     Comment: medical; seldom use       Review of Systems   Constitutional: Negative for fever  HENT: Negative for nosebleeds  Eyes: Negative for redness  Respiratory: Negative for shortness of breath  Cardiovascular: Negative for chest pain  Gastrointestinal: Positive for abdominal pain, diarrhea and nausea  Negative for blood in stool and vomiting  Genitourinary: Negative for hematuria  Musculoskeletal: Negative for gait problem  Skin: Negative for rash  Neurological: Negative for seizures  Psychiatric/Behavioral: Negative for behavioral problems  Physical Exam  Physical Exam  Constitutional:       Appearance: Normal appearance  She is obese  HENT:      Head: Normocephalic and atraumatic  Nose: No rhinorrhea  Mouth/Throat:      Mouth: Mucous membranes are moist    Eyes:      Extraocular Movements: Extraocular movements intact  Neck:      Musculoskeletal: Normal range of motion  Pulmonary:      Effort: Pulmonary effort is normal    Abdominal:      General: Abdomen is flat  Musculoskeletal: Normal range of motion  Skin:     General: Skin is warm and dry  Neurological:      General: No focal deficit present  Mental Status: She is alert     Psychiatric:         Mood and Affect: Mood normal          Behavior: Behavior normal          Vital Signs  ED Triage Vitals   Temperature Pulse Respirations Blood Pressure SpO2   05/29/21 1333 05/29/21 1333 05/29/21 1333 05/29/21 1339 05/29/21 1333   98 4 °F (36 9 °C) 89 18 (!) 200/110 99 %      Temp src Heart Rate Source Patient Position - Orthostatic VS BP Location FiO2 (%)   -- -- -- -- --             Pain Score       05/29/21 1333       8           Vitals:    05/29/21 1339 05/29/21 1537 05/29/21 1545 05/29/21 1610   BP: (!) 200/110 (!) 207/106 (!) 197/97 (!) 180/102   Pulse:  87           Visual Acuity      ED Medications  Medications   aluminum-magnesium hydroxide-simethicone (MYLANTA) oral suspension 30 mL (30 mL Oral Given 5/29/21 1424)   ondansetron (ZOFRAN) injection 4 mg (4 mg Intravenous Given 5/29/21 1426)   Lidocaine Viscous HCl (XYLOCAINE) 2 % mucosal solution 15 mL (15 mL Oral Given 5/29/21 1424)   famotidine (PEPCID) injection 20 mg (20 mg Intravenous Given 5/29/21 1427)   prochlorperazine (COMPAZINE) tablet 5 mg (5 mg Oral Given 5/29/21 1539)       Diagnostic Studies  Results Reviewed     Procedure Component Value Units Date/Time    CBC and differential [[id number]]  (Normal) Collected: 05/29/21 1424    Lab Status: Final result Specimen: Blood from Arm, Left Updated: 05/29/21 1504     WBC 8 73 Thousand/uL      RBC 4 84 Million/uL      Hemoglobin 13 5 g/dL      Hematocrit 41 1 %      MCV 85 fL      MCH 27 9 pg      MCHC 32 8 g/dL      RDW 14 8 %      MPV 10 8 fL      Platelets 276 Thousands/uL      Neutrophils Relative 58 %      Immat GRANS % 0 %      Lymphocytes Relative 31 %      Monocytes Relative 8 %      Eosinophils Relative 2 %      Basophils Relative 1 %      Neutrophils Absolute 5 10 Thousands/µL      Immature Grans Absolute 0 01 Thousand/uL      Lymphocytes Absolute 2 68 Thousands/µL      Monocytes Absolute 0 69 Thousand/µL      Eosinophils Absolute 0 21 Thousand/µL      Basophils Absolute 0 04 Thousands/µL     Comprehensive metabolic panel [[id number]]  (Abnormal) Collected: 05/29/21 1424    Lab Status: Final result Specimen: Blood from Arm, Left Updated: 05/29/21 1452     Sodium 142 mmol/L      Potassium 3 4 mmol/L      Chloride 101 mmol/L      CO2 32 mmol/L      ANION GAP 9 mmol/L      BUN 11 mg/dL      Creatinine 0 63 mg/dL      Glucose 262 mg/dL      Calcium 9 5 mg/dL      AST 14 U/L      ALT 15 U/L      Alkaline Phosphatase 88 2 U/L      Total Protein 6 9 g/dL      Albumin 3 9 g/dL      Total Bilirubin 0 55 mg/dL      eGFR 118 ml/min/1 73sq m     Narrative:      [place] guidelines for Chronic Kidney Disease (CKD):   Stage 1 with normal or high GFR (GFR > 90 mL/min/1 73 square meters)    Stage 2 Mild CKD (GFR = 60-89 mL/min/1 73 square meters)    Stage 3A Moderate CKD (GFR = 45-59 mL/min/1 73 square meters)    Stage 3B Moderate CKD (GFR = 30-44 mL/min/1 73 square meters)    Stage 4 Severe CKD (GFR = 15-29 mL/min/1 73 square meters)    Stage 5 End Stage CKD (GFR <15 mL/min/1 73 square meters)  Note: GFR calculation is accurate only with a steady state creatinine    Lipase [[id number]]  (Normal) Collected: 05/29/21 1424    Lab Status: Final result Specimen: Blood from Arm, Left Updated: 05/29/21 1452     Lipase 49 u/L     Magnesium [[id number]]  (Normal) Collected: 05/29/21 1424    Lab Status: Final result Specimen: Blood from Arm, Left Updated: 05/29/21 1452     Magnesium 1 7 mg/dL                  No orders to display              Procedures  Procedures         ED Course  ED Course as of May 29 1615   Sat May 29, 2021   1529 Patient feeling better  Reports she is still nauseous when she moves  Wants to try more nausea meds      1610 Patient reports she is still not feeling much better  Doesn't want more meds  Doesn't want to try crackers or ginger ale  Wishes to go home at this time                                SBIRT 22yo+      Most Recent Value   SBIRT (22 yo +)   In order to provide better care to our patients, we are screening all of our patients for alcohol and drug use  Would it be okay to ask you these screening questions? Yes Filed at: 05/29/2021 1422   Initial Alcohol Screen: US AUDIT-C    1  How often do you have a drink containing alcohol?  0 Filed at: 05/29/2021 1422   2  How many drinks containing alcohol do you have on a typical day you are drinking? 0 Filed at: 05/29/2021 1422   3a  Male UNDER 65: How often do you have five or more drinks on one occasion? 0 Filed at: 05/29/2021 1422   3b  FEMALE Any Age, or MALE 65+: How often do you have 4 or more drinks on one occassion?   0 Filed at: 05/29/2021 1422 Audit-C Score  0 Filed at: 05/29/2021 1422   PAVEL: How many times in the past year have you    Used an illegal drug or used a prescription medication for non-medical reasons? Never Filed at: 05/29/2021 1422                    MDM    Disposition  Final diagnoses:   GERD (gastroesophageal reflux disease)     Time reflects when diagnosis was documented in both MDM as applicable and the Disposition within this note     Time User Action Codes Description Comment    5/29/2021  3:29 PM Vandana Grier [K21 9] GERD (gastroesophageal reflux disease)       ED Disposition     ED Disposition Condition Date/Time Comment    Discharge Stable Sat May 29, 2021  3:29 PM Lidia Shannon discharge to home/self care              Follow-up Information     Follow up With Specialties Details Why Contact Info Additional Information    Kori Beck MD [facility] Medicine   [facility] [address] [place] [address]  [phone]       [facility] [address] Gastroenterology Schedule an appointment as soon as possible for a visit   [address] [place] [facility] [address] [facility] [id number] [address] [facility] Gastroenterology [address], [address] [place] [address], [id number], [phone]    [employer]  Call  As needed [phone]             Patient's Medications   Discharge Prescriptions    ALUMINUM-MAGNESIUM HYDROXIDE-SIMETHICONE (MAALOX) 200-200-20 MG/5ML SUSP    Take 20 mL by mouth 4 (four) times a day (before meals and at bedtime)       Start Date: 5/29/2021 End Date: --       Order Dose: 20 mL       Quantity: 355 mL    Refills: 0    FAMOTIDINE (PEPCID) 20 MG TABLET    Take 1 tablet (20 mg total) by mouth 2 (two) times a day       Start Date: 5/29/2021 End Date: --       Order Dose: 20 mg       Quantity: 30 tablet    Refills: 0    LIDOCAINE VISCOUS HCL (XYLOCAINE) 2 % MUCOSAL SOLUTION    Swish and swallow 15 mL 4 (four) times a day as needed (abdominal or esophageal pain) Start Date: 5/29/2021 End Date: --       Order Dose: 15 mL       Quantity: 100 mL    Refills: 0    ONDANSETRON (ZOFRAN-ODT) 4 MG DISINTEGRATING TABLET    Take 1 tablet (4 mg total) by mouth every 6 (six) hours as needed for nausea or vomiting       Start Date: 5/29/2021 End Date: --       Order Dose: 4 mg       Quantity: 20 tablet    Refills: 0     No discharge procedures on file      PDMP Review       Value Time User    PDMP Reviewed  Yes 3/8/2021  9:59 PM Kate Medina DO          ED Provider  Electronically Signed by           Prince Obdulio PA-C  05/29/21 1898

## 2021-06-09 VITALS
DIASTOLIC BLOOD PRESSURE: 90 MMHG | WEIGHT: 213 LBS | HEART RATE: 76 BPM | HEIGHT: 70 IN | BODY MASS INDEX: 30.49 KG/M2 | SYSTOLIC BLOOD PRESSURE: 144 MMHG | TEMPERATURE: 97.3 F | OXYGEN SATURATION: 99 %

## 2021-06-09 NOTE — ASSESSMENT & PLAN NOTE
-Tobacco Cessation Counseling: Tobacco cessation counseling and education was provided  The patient is sincerely urged to quit consumption of tobacco  She is not ready to quit tobacco  The numerous health risks of tobacco consumption were discussed  If she decides to quit, there are a number of helpful adjunctive aids, and she can see me to discuss nicotine replacement therapy, chantix, or bupropion anytime in the future    -Refilled trelegy for COPD

## 2021-06-09 NOTE — ASSESSMENT & PLAN NOTE
-Refilled PPI today per patient request  -Has GI appointment mid-July  -Discussed "Anti-reflux" measures:    -Raise the head of the bed 4 to 6 inches   -Avoid excess coffee, tea or other caffeinated beverages   -Avoid spicy or acidic foods, avoid peppermint, chocolate   -Minimize eating within 2 hours of bedtime   -Avoid garments that fit tightly through the abdomen

## 2021-06-10 ENCOUNTER — APPOINTMENT (EMERGENCY)
Dept: RADIOLOGY | Facility: HOSPITAL | Age: 56
End: 2021-06-10
Payer: MEDICARE

## 2021-06-10 ENCOUNTER — HOSPITAL ENCOUNTER (EMERGENCY)
Facility: HOSPITAL | Age: 56
Discharge: HOME | End: 2021-06-10
Attending: EMERGENCY MEDICINE | Admitting: EMERGENCY MEDICINE
Payer: MEDICARE

## 2021-06-10 VITALS
TEMPERATURE: 98.2 F | HEIGHT: 69 IN | BODY MASS INDEX: 30.51 KG/M2 | OXYGEN SATURATION: 100 % | DIASTOLIC BLOOD PRESSURE: 77 MMHG | WEIGHT: 206 LBS | HEART RATE: 95 BPM | RESPIRATION RATE: 18 BRPM | SYSTOLIC BLOOD PRESSURE: 218 MMHG

## 2021-06-10 DIAGNOSIS — M79.644 PAIN IN RIGHT FINGER(S): Primary | ICD-10-CM

## 2021-06-10 PROCEDURE — 99283 EMERGENCY DEPT VISIT LOW MDM: CPT

## 2021-06-10 PROCEDURE — 73130 X-RAY EXAM OF HAND: CPT

## 2021-06-10 PROCEDURE — 99284 EMERGENCY DEPT VISIT MOD MDM: CPT | Performed by: EMERGENCY MEDICINE

## 2021-06-10 PROCEDURE — 96372 THER/PROPH/DIAG INJ SC/IM: CPT

## 2021-06-10 RX ORDER — ACETAMINOPHEN 325 MG/1
975 TABLET ORAL ONCE
Status: COMPLETED | OUTPATIENT
Start: 2021-06-10 | End: 2021-06-10

## 2021-06-10 RX ORDER — KETOROLAC TROMETHAMINE 30 MG/ML
15 INJECTION, SOLUTION INTRAMUSCULAR; INTRAVENOUS ONCE
Status: COMPLETED | OUTPATIENT
Start: 2021-06-10 | End: 2021-06-10

## 2021-06-10 RX ORDER — NAPROXEN 500 MG/1
500 TABLET ORAL 2 TIMES DAILY WITH MEALS
Qty: 30 TABLET | Refills: 0 | Status: SHIPPED | OUTPATIENT
Start: 2021-06-10 | End: 2021-11-23 | Stop reason: HOSPADM

## 2021-06-10 RX ADMIN — ACETAMINOPHEN 975 MG: 325 TABLET, FILM COATED ORAL at 20:45

## 2021-06-10 RX ADMIN — KETOROLAC TROMETHAMINE 15 MG: 30 INJECTION, SOLUTION INTRAMUSCULAR at 20:46

## 2021-06-11 NOTE — ED PROVIDER NOTES
History  Chief Complaint   Patient presents with    Hand Pain     For past two days- right hand, 3rd and 4th digit has pain that radiates to inner palm  Pt reports edema in those two fingers for past month  Has appt with ortho in two weeks  Patient is a 52-year-old female with history of anxiety, diabetes, fibromyalgia, hypertension presenting for evaluation of a right 3rd and 4th finger pain  Patient says that she has been having pain of the PIP's of her right ring and middle finger for the past month  She says that she she felt like the pain got worse today so she came in for evaluation  She says that they are swollen    She denies any trauma to her hand  She says that the pain radiates from her fingers into her palm  She says that she is scheduled to see the hand doctor in 2 weeks  On exam, she is able to flex and extend the fingers  She does have tenderness to the PIPs  Her hand is neurovascularly intact          Prior to Admission Medications   Prescriptions Last Dose Informant Patient Reported? Taking?    ALPRAZolam (XANAX) 0 5 mg tablet  Self No No   Sig: "ONE-HALF" TABLET TWO TIMES A DAY   Blood Pressure Monitoring (Sphygmomanometer) MISC  Self No No   Sig: Use 2 (two) times a day   [facility] IRON 240 (27 Fe) MG tablet  Self No No   Sig: TAKE 1 TABLET (240 MG TOTAL) BY MOUTH 3 (THREE) TIMES A DAY WITH MEALS   Dulaglutide (Trulicity) 3 NX/7 8DQ SOPN  Self No No   Sig: Inject 0 5 mL (3 mg total) under the skin once a week   Insulin Pen Needle (UNIFINE PENTIPS PLUS) 31G X 6 MM MISC  Self Yes No   Si Device by Does not apply route 4 (four) times a day   Insulin Regular Human, Conc, (HumuLIN R U-500 KwikPen) 500 units/mL CONCENTRATED U-500 injection pen  Self No No   Sig: Inject 40 Units under the skin 3 (three) times a day before meals   Lancets MISC  Self Yes No   Si Device by Does not apply route 4 (four) times a day   Lidocaine Viscous HCl (XYLOCAINE) 2 % mucosal solution   No No   Sig: Swish and swallow 15 mL 4 (four) times a day as needed (abdominal or esophageal pain)   Magnesium 400 MG TABS  Self Yes No   Sig: Take by mouth   aluminum-magnesium hydroxide-simethicone (MAALOX) 406-414-90 MG/5ML SUSP   No No   Sig: Take 20 mL by mouth 4 (four) times a day (before meals and at bedtime)   amLODIPine (NORVASC) 10 mg tablet  Self Yes No   Sig: Take 10 mg by mouth daily   atorvastatin (LIPITOR) 40 mg tablet  Self No No   Sig: Take 1 tablet (40 mg total) by mouth daily   bacitracin topical ointment 500 units/g topical ointment  Self No No   Sig: Apply to bilateral nares twice per day     Patient not taking: Reported on 4/30/2021   cyclobenzaprine (FLEXERIL) 10 mg tablet  Self No No   Sig: Take 1 tablet (10 mg total) by mouth 3 (three) times a day as needed for muscle spasms for up to 10 days   dexamethasone (DECADRON) 1 mg tablet  Self No No   Sig: Take 1 tablet (1 mg total) by mouth 2 (two) times a day with meals Take tablet between 10-11pm and then have lab next day in the morning 7-9am    diclofenac (VOLTAREN) 75 mg EC tablet  Self No No   Sig: Take 1 tablet (75 mg total) by mouth 2 (two) times a day   doxazosin (CARDURA) 8 MG tablet  Self No No   Sig: Take 1 tablet (8 mg total) by mouth daily   ergocalciferol (VITAMIN D2) 50,000 units  Self No No   Sig: Take 1 capsule (50,000 Units total) by mouth 2 (two) times a week   famotidine (PEPCID) 20 mg tablet   No No   Sig: Take 1 tablet (20 mg total) by mouth 2 (two) times a day   fenofibrate (TRICOR) 145 mg tablet  Self No No   Sig: TAKE ONE TABLET EVERY DAY   fluticasone-umeclidinium-vilanterol (Trelegy Ellipta) 100-62 5-25 MCG/INH inhaler   No No   Sig: Inhale 1 puff daily Rinse mouth after use    hydrALAZINE (APRESOLINE) 50 mg tablet  Self No No   Sig: TAKE TWO TABLETS (100MG) TWO TIMES A DAY   hydrochlorothiazide (HYDRODIURIL) 25 mg tablet  Self No No   Sig: TAKE ONE TABLET EVERY DAY   ibuprofen (MOTRIN) 800 mg tablet  Self No No   Sig: Take 1 tablet (800 mg total) by mouth every 6 (six) hours as needed for moderate pain   lidocaine (LIDODERM) 5 %  Self No No   Sig: Apply 1 patch topically daily Remove & Discard patch within 12 hours or as directed by MD   lisinopril (ZESTRIL) 40 mg tablet  Self No No   Sig: TAKE ONE TABLET TWO TIMES A DAY   meclizine (ANTIVERT) 25 mg tablet  Self No No   Sig: Take 1 tablet (25 mg total) by mouth every 8 (eight) hours as needed for dizziness   metoprolol succinate (TOPROL-XL) 200 MG 24 hr tablet  Self No No   Sig: TAKE 1 TABLET EVERY DAY BY ORAL ROUTE   omeprazole (PriLOSEC) 40 MG capsule   No No   Sig: Take 1 capsule (40 mg total) by mouth daily   ondansetron (ZOFRAN) 4 mg tablet  Self No No   Sig: Take 1 tablet (4 mg total) by mouth every 6 (six) hours   Patient not taking: Reported on 5/29/2021   ondansetron (ZOFRAN-ODT) 4 mg disintegrating tablet   No No   Sig: Take 1 tablet (4 mg total) by mouth every 6 (six) hours as needed for nausea or vomiting   pioglitazone (ACTOS) 30 mg tablet  Self No No   Sig: Take 1 tablet (30 mg total) by mouth daily   pregabalin (LYRICA) 75 mg capsule  Self No No   Sig: TAKE ONE CAPSULE EVERY DAY   spironolactone (ALDACTONE) 25 mg tablet  Self Yes No   Sig: Take 25 mg by mouth daily    zolpidem (AMBIEN) 10 mg tablet  Self No No   Sig: TAKE 1 TABLET (10 MG TOTAL) BY MOUTH DAILY AT BEDTIME AS NEEDED FOR SLEEP      Facility-Administered Medications: None       Past Medical History:   Diagnosis Date    Anxiety     Aortic aneurysm (HCC)     Arthritis     Depression     Diabetes mellitus (HCC)     Fibromyalgia     GERD (gastroesophageal reflux disease)     GERD (gastroesophageal reflux disease)     H/O cardiovascular stress test 09/2018    no ischemia  EF 70%   H/O echocardiogram 01/2019    EF 60%  Mild LVH  trivial effusion      Hyperlipidemia     Hypertension     Migraines     Psychiatric disorder     anxiety       Past Surgical History:   Procedure Laterality Date    BACK SURGERY Lumbar epidural steroid injection    CARPAL TUNNEL RELEASE Left      SECTION      CHOLECYSTECTOMY      COLONOSCOPY      incomplete    HYSTERECTOMY      Total    OVARIAN CYST REMOVAL      TUBAL LIGATION         Family History   Problem Relation Age of Onset    Hypertension Mother    Delonte Coop Arthritis Mother     Diabetes Father     Other Sister         renal cell carcinoma    Diabetes Other      I have reviewed and agree with the history as documented  E-Cigarette/Vaping    E-Cigarette Use Never User      E-Cigarette/Vaping Substances    Nicotine No     THC No     CBD No     Flavoring No     Other No     Unknown No      Social History     Tobacco Use    Smoking status: Current Every Day Smoker     Packs/day: 1 00     Years: 30 00     Pack years: 30 00     Types: Cigarettes    Smokeless tobacco: Never Used   Substance Use Topics    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   Drug use: Yes     Types: Marijuana     Comment: medical; seldom use       Review of Systems   Constitutional: Negative for fever and unexpected weight change  HENT: Negative for congestion, ear pain, sore throat and trouble swallowing  Eyes: Negative for pain and redness  Respiratory: Negative for cough, chest tightness and shortness of breath  Cardiovascular: Negative for chest pain and leg swelling  Gastrointestinal: Negative for abdominal distention, abdominal pain, diarrhea and vomiting  Endocrine: Negative for polyuria  Genitourinary: Negative for dysuria, hematuria, pelvic pain and vaginal bleeding  Musculoskeletal: Positive for arthralgias (R 3rd and 4th finger)  Negative for back pain and myalgias  Skin: Negative for rash  Neurological: Negative for dizziness, syncope, weakness, light-headedness and headaches  Physical Exam  Physical Exam  Vitals signs and nursing note reviewed  Constitutional:       General: She is not in acute distress       Appearance: She is well-developed  HENT:      Head: Normocephalic and atraumatic  Right Ear: External ear normal       Left Ear: External ear normal       Mouth/Throat:      Pharynx: No oropharyngeal exudate  Eyes:      Conjunctiva/sclera: Conjunctivae normal       Pupils: Pupils are equal, round, and reactive to light  Neck:      Musculoskeletal: Normal range of motion and neck supple  Cardiovascular:      Rate and Rhythm: Normal rate and regular rhythm  Heart sounds: Normal heart sounds  No murmur  No friction rub  No gallop  Pulmonary:      Effort: Pulmonary effort is normal  No respiratory distress  Breath sounds: Normal breath sounds  No wheezing or rales  Abdominal:      General: There is no distension  Palpations: Abdomen is soft  Tenderness: There is no abdominal tenderness  There is no guarding  Musculoskeletal: Normal range of motion  Hands:    Lymphadenopathy:      Cervical: No cervical adenopathy  Skin:     General: Skin is warm and dry  Neurological:      Mental Status: She is alert and oriented to person, place, and time  Cranial Nerves: No cranial nerve deficit  Sensory: No sensory deficit  Motor: No abnormal muscle tone           Vital Signs  ED Triage Vitals [06/10/21 2008]   Temperature Pulse Respirations Blood Pressure SpO2   98 2 °F (36 8 °C) 95 18 (!) 218/77 100 %      Temp Source Heart Rate Source Patient Position - Orthostatic VS BP Location FiO2 (%)   Oral Monitor Lying Left arm --      Pain Score       7           Vitals:    06/10/21 2008   BP: (!) 218/77   Pulse: 95   Patient Position - Orthostatic VS: Lying         Visual Acuity      ED Medications  Medications   acetaminophen (TYLENOL) tablet 975 mg (975 mg Oral Given 6/10/21 2045)   ketorolac (TORADOL) injection 15 mg (15 mg Intramuscular Given 6/10/21 2046)       Diagnostic Studies  Results Reviewed     None                 XR hand 3+ views RIGHT   ED Interpretation by Alex Shah DO (06/10 2102)   No acute osseous abnormality                  Procedures  Procedures         ED Course                                           MDM  Number of Diagnoses or Management Options  Diagnosis management comments: Patient is a 60-year-old female presenting for evaluation of pain of her right 3rd and 4th finger  Has been ongoing for the past month  States that they are swollen    Is scheduled to see the hand doctor in 2 weeks  Came in because the pain got worse tonight  On exam, has tenderness to the PIP joints of the right ring and middle finger  She is able to flex and extend  Hand is neurovascularly intact  Will obtain x-ray of right hand  Will give Toradol and Tylenol  Disposition  Final diagnoses:   Pain in right finger(s)     Time reflects when diagnosis was documented in both MDM as applicable and the Disposition within this note     Time User Action Codes Description Comment    6/10/2021  9:02 PM Del Espinoza Add [H06 862] Right hand pain     6/10/2021  9:02 PM Del Espinoza Remove [I10 580] Right hand pain     6/10/2021  9:02 PM Del Dagmitchel Add [A88 237] Pain in right finger(s)       ED Disposition     ED Disposition Condition Date/Time Comment    Discharge Stable u Jacques 10, 2021  9:02 PM Tanya Garland discharge to home/self care  Follow-up Information     Follow up With Specialties Details Why Contact Info    Governor MD Denise [facility] Medicine Schedule an appointment as soon as possible for a visit  For follow up [address] [place] [facility]placed by [facility] [address]  [phone]            Patient's Medications   Discharge Prescriptions    NAPROXEN (NAPROSYN) 500 MG TABLET    Take 1 tablet (500 mg total) by mouth 2 (two) times a day with meals       Start Date: 6/10/2021 End Date: --       Order Dose: 500 mg       Quantity: 30 tablet    Refills: 0     No discharge procedures on file      PDMP Review       Value Time User    PDMP Reviewed  Yes 3/8/2021  9:59 PM Gill Donaldson DO ED Provider  Electronically Signed by           Servando Abbott DO  06/10/21 0484

## 2021-06-12 NOTE — RESULT ENCOUNTER NOTE
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

## 2021-06-20 ENCOUNTER — HOSPITAL ENCOUNTER (EMERGENCY)
Facility: HOSPITAL | Age: 56
Discharge: HOME | End: 2021-06-20
Attending: EMERGENCY MEDICINE | Admitting: EMERGENCY MEDICINE
Payer: MEDICARE

## 2021-06-20 ENCOUNTER — APPOINTMENT (EMERGENCY)
Dept: CT IMAGING | Facility: HOSPITAL | Age: 56
End: 2021-06-20
Payer: MEDICARE

## 2021-06-20 VITALS
DIASTOLIC BLOOD PRESSURE: 102 MMHG | BODY MASS INDEX: 30.57 KG/M2 | SYSTOLIC BLOOD PRESSURE: 213 MMHG | OXYGEN SATURATION: 99 % | RESPIRATION RATE: 17 BRPM | HEART RATE: 95 BPM | WEIGHT: 207 LBS | TEMPERATURE: 98 F

## 2021-06-20 VITALS
TEMPERATURE: 98 F | OXYGEN SATURATION: 99 % | SYSTOLIC BLOOD PRESSURE: 195 MMHG | DIASTOLIC BLOOD PRESSURE: 90 MMHG | RESPIRATION RATE: 16 BRPM | HEART RATE: 85 BPM

## 2021-06-20 DIAGNOSIS — L03.213 PRESEPTAL CELLULITIS OF LEFT EYE: Primary | ICD-10-CM

## 2021-06-20 DIAGNOSIS — L03.213 PRESEPTAL CELLULITIS OF LEFT UPPER EYELID: Primary | ICD-10-CM

## 2021-06-20 DIAGNOSIS — E87.6 HYPOKALEMIA: ICD-10-CM

## 2021-06-20 LAB
ANION GAP SERPL CALCULATED.3IONS-SCNC: 7 MMOL/L (ref 4–13)
BASOPHILS # BLD AUTO: 0.03 THOUSANDS/ΜL (ref 0–0.1)
BASOPHILS NFR BLD AUTO: 0 % (ref 0–1)
BUN SERPL-MCNC: 11 MG/DL (ref 6–20)
CALCIUM SERPL-MCNC: 8.9 MG/DL (ref 8.4–10.2)
CHLORIDE SERPL-SCNC: 103 MMOL/L (ref 96–108)
CO2 SERPL-SCNC: 31 MMOL/L (ref 22–33)
CREAT SERPL-MCNC: 0.58 MG/DL (ref 0.4–1.1)
EOSINOPHIL # BLD AUTO: 0.28 THOUSAND/ΜL (ref 0–0.61)
EOSINOPHIL NFR BLD AUTO: 3 % (ref 0–6)
ERYTHROCYTE [DISTWIDTH] IN BLOOD BY AUTOMATED COUNT: 14.4 % (ref 11.6–15.1)
GFR SERPL CREATININE-BSD FRML MDRD: 120 ML/MIN/1.73SQ M
GLUCOSE SERPL-MCNC: 236 MG/DL (ref 65–140)
HCT VFR BLD AUTO: 41.1 % (ref 34.8–46.1)
HGB BLD-MCNC: 13.5 G/DL (ref 11.5–15.4)
IMM GRANULOCYTES # BLD AUTO: 0.02 THOUSAND/UL (ref 0–0.2)
IMM GRANULOCYTES NFR BLD AUTO: 0 % (ref 0–2)
LYMPHOCYTES # BLD AUTO: 2.31 THOUSANDS/ΜL (ref 0.6–4.47)
LYMPHOCYTES NFR BLD AUTO: 24 % (ref 14–44)
MCH RBC QN AUTO: 27.7 PG (ref 26.8–34.3)
MCHC RBC AUTO-ENTMCNC: 32.8 G/DL (ref 31.4–37.4)
MCV RBC AUTO: 84 FL (ref 82–98)
MONOCYTES # BLD AUTO: 0.75 THOUSAND/ΜL (ref 0.17–1.22)
MONOCYTES NFR BLD AUTO: 8 % (ref 4–12)
NEUTROPHILS # BLD AUTO: 6.34 THOUSANDS/ΜL (ref 1.85–7.62)
NEUTS SEG NFR BLD AUTO: 65 % (ref 43–75)
PLATELET # BLD AUTO: 316 THOUSANDS/UL (ref 149–390)
PMV BLD AUTO: 10.4 FL (ref 8.9–12.7)
POTASSIUM SERPL-SCNC: 3.1 MMOL/L (ref 3.5–5)
RBC # BLD AUTO: 4.87 MILLION/UL (ref 3.81–5.12)
SODIUM SERPL-SCNC: 141 MMOL/L (ref 133–145)
WBC # BLD AUTO: 9.73 THOUSAND/UL (ref 4.31–10.16)

## 2021-06-20 PROCEDURE — 36415 COLL VENOUS BLD VENIPUNCTURE: CPT | Performed by: EMERGENCY MEDICINE

## 2021-06-20 PROCEDURE — 80048 BASIC METABOLIC PNL TOTAL CA: CPT | Performed by: EMERGENCY MEDICINE

## 2021-06-20 PROCEDURE — NC001 PR NO CHARGE: Performed by: EMERGENCY MEDICINE

## 2021-06-20 PROCEDURE — 99283 EMERGENCY DEPT VISIT LOW MDM: CPT

## 2021-06-20 PROCEDURE — 99284 EMERGENCY DEPT VISIT MOD MDM: CPT | Performed by: EMERGENCY MEDICINE

## 2021-06-20 PROCEDURE — 70487 CT MAXILLOFACIAL W/DYE: CPT

## 2021-06-20 PROCEDURE — 85025 COMPLETE CBC W/AUTO DIFF WBC: CPT | Performed by: EMERGENCY MEDICINE

## 2021-06-20 RX ORDER — AMOXICILLIN AND CLAVULANATE POTASSIUM 875; 125 MG/1; MG/1
1 TABLET, FILM COATED ORAL EVERY 12 HOURS
Qty: 14 TABLET | Refills: 0 | Status: SHIPPED | OUTPATIENT
Start: 2021-06-20 | End: 2021-06-27

## 2021-06-20 RX ORDER — AMOXICILLIN AND CLAVULANATE POTASSIUM 875; 125 MG/1; MG/1
1 TABLET, FILM COATED ORAL ONCE
Status: COMPLETED | OUTPATIENT
Start: 2021-06-20 | End: 2021-06-20

## 2021-06-20 RX ORDER — POTASSIUM CHLORIDE 20 MEQ/1
40 TABLET, EXTENDED RELEASE ORAL ONCE
Status: COMPLETED | OUTPATIENT
Start: 2021-06-20 | End: 2021-06-20

## 2021-06-20 RX ADMIN — AMOXICILLIN AND CLAVULANATE POTASSIUM 1 TABLET: 875; 125 TABLET, FILM COATED ORAL at 07:56

## 2021-06-20 RX ADMIN — IOHEXOL 85 ML: 350 INJECTION, SOLUTION INTRAVENOUS at 11:17

## 2021-06-20 RX ADMIN — POTASSIUM CHLORIDE 40 MEQ: 1500 TABLET, EXTENDED RELEASE ORAL at 11:29

## 2021-06-20 NOTE — Clinical Note
Zaid Cee was seen and treated in our emergency department on 6/20/2021  Diagnosis: Left eye pre-septal cellulitis    Kelli    She may return on this date: 06/20/2021         If you have any questions or concerns, please don't hesitate to call        Graeme Moura DO    ______________________________           _______________          _______________  Hospital Representative                              Date                                Time

## 2021-06-20 NOTE — ED NOTES
Pt verbalized understanding of discharge instructions; ambulated out of the ED without assistance; provided work note prior to discharge; uneventful ED visit     Marvin Cline RN  06/20/21 1560

## 2021-06-20 NOTE — ED PROVIDER NOTES
History  Chief Complaint   Patient presents with    Eye Swelling     Patient felt pressure in her eye yesterday and today now has swelling  Complains of blurry vision in the left eye  Patient is a 70-year-old female who presents for evaluation of left eyelid pain/swelling  Patient says that she knows the sensation yesterday and noticed she had what appeared to be a stye on the left eyelid  She says that today she woke up and her eyelid was swollen and red  She also says that her vision "isnt as clear" as it usually is  Patient says that she is supposed to wear glasses but lost them  She admits to chronic blurry vision in the R eye that is not any worse than normal   She has had cataract surgery previously in the right eye  She does say that she was told she has a cataract in the left eye though it was not as bad as the right eye   She denies any trauma to the left eye  She denies any fevers, chills, nausea, vomiting  Prior to Admission Medications   Prescriptions Last Dose Informant Patient Reported? Taking?    ALPRAZolam (XANAX) 0 5 mg tablet  Self No No   Sig: "ONE-HALF" TABLET TWO TIMES A DAY   Blood Pressure Monitoring (Sphygmomanometer) MISC  Self No No   Sig: Use 2 (two) times a day   [facility] IRON 240 (27 Fe) MG tablet  Self No No   Sig: TAKE 1 TABLET (240 MG TOTAL) BY MOUTH 3 (THREE) TIMES A DAY WITH MEALS   Dulaglutide (Trulicity) 3 LT/6 2EH SOPN  Self No No   Sig: Inject 0 5 mL (3 mg total) under the skin once a week   Insulin Pen Needle (UNIFINE PENTIPS PLUS) 31G X 6 MM MISC  Self Yes No   Si Device by Does not apply route 4 (four) times a day   Insulin Regular Human, Conc, (HumuLIN R U-500 KwikPen) 500 units/mL CONCENTRATED U-500 injection pen  Self No No   Sig: Inject 40 Units under the skin 3 (three) times a day before meals   Lancets MISC  Self Yes No   Si Device by Does not apply route 4 (four) times a day   Lidocaine Viscous HCl (XYLOCAINE) 2 % mucosal solution   No No   Sig: Swish and swallow 15 mL 4 (four) times a day as needed (abdominal or esophageal pain)   Magnesium 400 MG TABS  Self Yes No   Sig: Take by mouth   aluminum-magnesium hydroxide-simethicone (MAALOX) 335-212-10 MG/5ML SUSP   No No   Sig: Take 20 mL by mouth 4 (four) times a day (before meals and at bedtime)   amLODIPine (NORVASC) 10 mg tablet  Self Yes No   Sig: Take 10 mg by mouth daily   atorvastatin (LIPITOR) 40 mg tablet  Self No No   Sig: Take 1 tablet (40 mg total) by mouth daily   bacitracin topical ointment 500 units/g topical ointment  Self No No   Sig: Apply to bilateral nares twice per day     Patient not taking: Reported on 4/30/2021   cyclobenzaprine (FLEXERIL) 10 mg tablet  Self No No   Sig: Take 1 tablet (10 mg total) by mouth 3 (three) times a day as needed for muscle spasms for up to 10 days   dexamethasone (DECADRON) 1 mg tablet  Self No No   Sig: Take 1 tablet (1 mg total) by mouth 2 (two) times a day with meals Take tablet between 10-11pm and then have lab next day in the morning 7-9am    diclofenac (VOLTAREN) 75 mg EC tablet  Self No No   Sig: Take 1 tablet (75 mg total) by mouth 2 (two) times a day   doxazosin (CARDURA) 8 MG tablet  Self No No   Sig: Take 1 tablet (8 mg total) by mouth daily   ergocalciferol (VITAMIN D2) 50,000 units  Self No No   Sig: Take 1 capsule (50,000 Units total) by mouth 2 (two) times a week   famotidine (PEPCID) 20 mg tablet   No No   Sig: Take 1 tablet (20 mg total) by mouth 2 (two) times a day   fenofibrate (TRICOR) 145 mg tablet  Self No No   Sig: TAKE ONE TABLET EVERY DAY   fluticasone-umeclidinium-vilanterol (Trelegy Ellipta) 100-62 5-25 MCG/INH inhaler   No No   Sig: Inhale 1 puff daily Rinse mouth after use    hydrALAZINE (APRESOLINE) 50 mg tablet  Self No No   Sig: TAKE TWO TABLETS (100MG) TWO TIMES A DAY   hydrochlorothiazide (HYDRODIURIL) 25 mg tablet  Self No No   Sig: TAKE ONE TABLET EVERY DAY   ibuprofen (MOTRIN) 800 mg tablet  Self No No   Sig: Take 1 tablet (800 mg total) by mouth every 6 (six) hours as needed for moderate pain   lidocaine (LIDODERM) 5 %  Self No No   Sig: Apply 1 patch topically daily Remove & Discard patch within 12 hours or as directed by MD   lisinopril (ZESTRIL) 40 mg tablet  Self No No   Sig: TAKE ONE TABLET TWO TIMES A DAY   meclizine (ANTIVERT) 25 mg tablet  Self No No   Sig: Take 1 tablet (25 mg total) by mouth every 8 (eight) hours as needed for dizziness   metoprolol succinate (TOPROL-XL) 200 MG 24 hr tablet  Self No No   Sig: TAKE 1 TABLET EVERY DAY BY ORAL ROUTE   naproxen (NAPROSYN) 500 mg tablet   No No   Sig: Take 1 tablet (500 mg total) by mouth 2 (two) times a day with meals   omeprazole (PriLOSEC) 40 MG capsule   No No   Sig: Take 1 capsule (40 mg total) by mouth daily   ondansetron (ZOFRAN-ODT) 4 mg disintegrating tablet   No No   Sig: Take 1 tablet (4 mg total) by mouth every 6 (six) hours as needed for nausea or vomiting   pioglitazone (ACTOS) 30 mg tablet  Self No No   Sig: Take 1 tablet (30 mg total) by mouth daily   pregabalin (LYRICA) 75 mg capsule  Self No No   Sig: TAKE ONE CAPSULE EVERY DAY   spironolactone (ALDACTONE) 25 mg tablet  Self Yes No   Sig: Take 25 mg by mouth daily    zolpidem (AMBIEN) 10 mg tablet  Self No No   Sig: TAKE 1 TABLET (10 MG TOTAL) BY MOUTH DAILY AT BEDTIME AS NEEDED FOR SLEEP      Facility-Administered Medications: None       Past Medical History:   Diagnosis Date    Anxiety     Aortic aneurysm (HCC)     Arthritis     Depression     Diabetes mellitus (HCC)     Fibromyalgia     GERD (gastroesophageal reflux disease)     GERD (gastroesophageal reflux disease)     H/O cardiovascular stress test 09/2018    no ischemia  EF 70%   H/O echocardiogram 01/2019    EF 60%  Mild LVH  trivial effusion      Hyperlipidemia     Hypertension     Migraines     Psychiatric disorder     anxiety       Past Surgical History:   Procedure Laterality Date    BACK SURGERY      Lumbar epidural steroid injection    CARPAL TUNNEL RELEASE Left      SECTION      CHOLECYSTECTOMY      COLONOSCOPY      incomplete    HYSTERECTOMY      Total    OVARIAN CYST REMOVAL      TUBAL LIGATION         Family History   Problem Relation Age of Onset    Hypertension Mother    Deadfinn Adams Arthritis Mother     Diabetes Father     Other Sister         renal cell carcinoma    Diabetes Other      I have reviewed and agree with the history as documented  E-Cigarette/Vaping    E-Cigarette Use Never User      E-Cigarette/Vaping Substances    Nicotine No     THC No     CBD No     Flavoring No     Other No     Unknown No      Social History     Tobacco Use    Smoking status: Current Every Day Smoker     Packs/day: 1 00     Years: 30 00     Pack years: 30 00     Types: Cigarettes    Smokeless tobacco: Never Used   Vaping Use    Vaping Use: Never used   Substance Use Topics    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   Drug use: Yes     Types: Marijuana     Comment: medical; seldom use       Review of Systems   Constitutional: Negative for chills, diaphoresis and fever  HENT: Negative for congestion, sinus pressure, sore throat and trouble swallowing  Style present on Left eyelid  Left eyelid redness/swelling   Eyes: Negative for pain, discharge and itching  Respiratory: Negative for cough, chest tightness, shortness of breath and wheezing  Cardiovascular: Negative for chest pain, palpitations and leg swelling  Gastrointestinal: Negative for abdominal distention, abdominal pain, blood in stool, diarrhea, nausea and vomiting  Endocrine: Negative for polyphagia and polyuria  Genitourinary: Negative for difficulty urinating, dysuria, flank pain, hematuria, pelvic pain and vaginal bleeding  Musculoskeletal: Negative for arthralgias and back pain  Skin: Negative for rash  Neurological: Negative for dizziness, syncope, weakness, light-headedness and headaches         Physical Exam  Physical Exam  Vitals and nursing note reviewed  Constitutional:       General: She is not in acute distress  Appearance: She is well-developed  HENT:      Head: Normocephalic and atraumatic  Right Ear: External ear normal       Left Ear: External ear normal       Mouth/Throat:      Pharynx: No oropharyngeal exudate  Eyes:      General: No visual field deficit  Right eye: No foreign body or discharge  Left eye: No foreign body or discharge  Extraocular Movements: Extraocular movements intact  Right eye: Normal extraocular motion  Left eye: Normal extraocular motion  Conjunctiva/sclera: Conjunctivae normal       Right eye: Right conjunctiva is not injected  No chemosis  Left eye: Left conjunctiva is not injected  No chemosis  Pupils: Pupils are equal, round, and reactive to light  Cardiovascular:      Rate and Rhythm: Normal rate and regular rhythm  Heart sounds: Normal heart sounds  No murmur heard  No friction rub  No gallop  Pulmonary:      Effort: Pulmonary effort is normal  No respiratory distress  Breath sounds: Normal breath sounds  No wheezing or rales  Abdominal:      General: There is no distension  Palpations: Abdomen is soft  Tenderness: There is no abdominal tenderness  There is no guarding  Musculoskeletal:         General: No tenderness or deformity  Normal range of motion  Cervical back: Normal range of motion and neck supple  Lymphadenopathy:      Cervical: No cervical adenopathy  Skin:     General: Skin is warm and dry  Neurological:      Mental Status: She is alert and oriented to person, place, and time  Cranial Nerves: No cranial nerve deficit  Sensory: No sensory deficit  Motor: No abnormal muscle tone           Vital Signs  ED Triage Vitals [06/20/21 0731]   Temperature Pulse Respirations Blood Pressure SpO2   98 °F (36 7 °C) 95 17 (!) 213/102 99 %      Temp Source Heart Rate Source Patient Position - Orthostatic VS BP Location FiO2 (%)   Oral Monitor Sitting Left arm --      Pain Score       8           Vitals:    06/20/21 0731   BP: (!) 213/102   Pulse: 95   Patient Position - Orthostatic VS: Sitting         Visual Acuity  Visual Acuity      Most Recent Value   Visual acuity R eye is  20/200   Visual acuity Left eye is  20/25   Visual acuity in both eyes is  20/30   No corrective eyewear/lenses  Yes          ED Medications  Medications   amoxicillin-clavulanate (AUGMENTIN) 875-125 mg per tablet 1 tablet (1 tablet Oral Given 6/20/21 0756)       Diagnostic Studies  Results Reviewed     None                 No orders to display              Procedures  Procedures         ED Course                             SBIRT 22yo+      Most Recent Value   SBIRT (24 yo +)   In order to provide better care to our patients, we are screening all of our patients for alcohol and drug use  Would it be okay to ask you these screening questions? Yes Filed at: 06/20/2021 2076   Initial Alcohol Screen: US AUDIT-C    1  How often do you have a drink containing alcohol?  0 Filed at: 06/20/2021 0735   2  How many drinks containing alcohol do you have on a typical day you are drinking? 0 Filed at: 06/20/2021 0735   3a  Male UNDER 65: How often do you have five or more drinks on one occasion? 0 Filed at: 06/20/2021 0735   3b  FEMALE Any Age, or MALE 65+: How often do you have 4 or more drinks on one occassion? 0 Filed at: 06/20/2021 0735   Audit-C Score  0 Filed at: 06/20/2021 6360   PAVEL: How many times in the past year have you    Used an illegal drug or used a prescription medication for non-medical reasons? Never Filed at: 06/20/2021 4891                    MDM  Number of Diagnoses or Management Options  Preseptal cellulitis of left upper eyelid  Diagnosis management comments: 52-year-old female presenting for left upper eyelid swelling/pain  Noticed a stye yesterday    Noticed increased redness and swelling this morning  Denies fevers or chills  Admits to "seeing less clear" in the left eye  History of chronic blurred vision in the right eye that is no worse today  On exam has redness and swelling to the left upper eyelid with what appears to be a stye  No injection of the eye, pupils equal reactive  Intact extraocular motion with no significant discomfort  Believe symptoms are secondary to preseptal cellulitis of left eyelid from stye given swelling localized to stye location, lack of pain with extraocular motion, normal visual acuity in left eye  Will treat with Augmentin b i d   Patient told that is important that she follows up with her ophthalmologist or ophthalmologist provide to her within the next 1-2 days for recheck  Told to return to the ED if she develops worsening redness, swelling, significant pain with extraocular motion, change in her vision, or any other concerning symptoms      Disposition  Final diagnoses:   Preseptal cellulitis of left upper eyelid     Time reflects when diagnosis was documented in both MDM as applicable and the Disposition within this note     Time User Action Codes Description Comment    6/20/2021  7:52 AM Gaetano Ryan Add [[id number] [id number]] Preseptal cellulitis of left eye     6/20/2021  7:52 AM Gaetano Ryan Remove [[id number]] Preseptal cellulitis of left eye     6/20/2021  7:53 AM Gaetano Ryan Add [K75 [id number]] Preseptal cellulitis of left upper eyelid       ED Disposition     ED Disposition Condition Date/Time Comment    Discharge Stable Sun Jun 20, 2021  7:52 AM Noah Arreola discharge to home/self care              Follow-up Information     Follow up With Specialties Details Why Contact Info Additional Information    Grace Sommers MD Ophthalmology Call today To schedule an appointment for follow up in 1-2 days [address]  [id number] [address] Emergency Department Emergency Medicine Go to  If symptoms worsen [address] [facility],[address] [facility] Emergency Department, [address], [address]          Discharge Medication List as of 6/20/2021  8:00 AM      START taking these medications    Details   amoxicillin-clavulanate (AUGMENTIN) 875-125 mg per tablet Take 1 tablet by mouth every 12 (twelve) hours for 7 days, Starting Sun 6/20/2021, Until Sun 6/27/2021, Normal         CONTINUE these medications which have NOT CHANGED    Details   ALPRAZolam (XANAX) 0 5 mg tablet "ONE-HALF" TABLET TWO TIMES A DAY, Normal      aluminum-magnesium hydroxide-simethicone (MAALOX) 200-200-20 MG/5ML SUSP Take 20 mL by mouth 4 (four) times a day (before meals and at bedtime), Starting Sat 5/29/2021, Normal      amLODIPine (NORVASC) 10 mg tablet Take 10 mg by mouth daily, Historical Med      atorvastatin (LIPITOR) 40 mg tablet Take 1 tablet (40 mg total) by mouth daily, Starting Thu 1/14/2021, Normal      bacitracin topical ointment 500 units/g topical ointment Apply to bilateral nares twice per day , Normal      Blood Pressure Monitoring (Sphygmomanometer) MISC Use 2 (two) times a day, Starting Fri 4/30/2021, Normal      [facility]S IRON 240 (27 Fe) MG tablet TAKE 1 TABLET (240 MG TOTAL) BY MOUTH 3 (THREE) TIMES A DAY WITH MEALS, Normal      cyclobenzaprine (FLEXERIL) 10 mg tablet Take 1 tablet (10 mg total) by mouth 3 (three) times a day as needed for muscle spasms for up to 10 days, Starting Tue 5/5/2020, Until Sat 5/29/2021, Normal      dexamethasone (DECADRON) 1 mg tablet Take 1 tablet (1 mg total) by mouth 2 (two) times a day with meals Take tablet between 10-11pm and then have lab next day in the morning 7-9am , Starting Thu 12/3/2020, Normal      diclofenac (VOLTAREN) 75 mg EC tablet Take 1 tablet (75 mg total) by mouth 2 (two) times a day, Starting Mon 6/15/2020, Normal      doxazosin (CARDURA) 8 MG tablet Take 1 tablet (8 mg total) by mouth daily, Starting Fri 4/30/2021, Normal      Dulaglutide (Trulicity) 3 QV/7 7CN SOPN Inject 0 5 mL (3 mg total) under the skin once a week, Starting Thu 5/6/2021, Normal      ergocalciferol (VITAMIN D2) 50,000 units Take 1 capsule (50,000 Units total) by mouth 2 (two) times a week, Starting Thu 5/28/2020, Normal      famotidine (PEPCID) 20 mg tablet Take 1 tablet (20 mg total) by mouth 2 (two) times a day, Starting Sat 5/29/2021, Normal      fenofibrate (TRICOR) 145 mg tablet TAKE ONE TABLET EVERY DAY, Normal      fluticasone-umeclidinium-vilanterol (Trelegy Ellipta) 100-62 5-25 MCG/INH inhaler Inhale 1 puff daily Rinse mouth after use , Starting Mon 5/17/2021, Normal      hydrALAZINE (APRESOLINE) 50 mg tablet TAKE TWO TABLETS (100MG) TWO TIMES A DAY, Normal      hydrochlorothiazide (HYDRODIURIL) 25 mg tablet TAKE ONE TABLET EVERY DAY, Normal      ibuprofen (MOTRIN) 800 mg tablet Take 1 tablet (800 mg total) by mouth every 6 (six) hours as needed for moderate pain, Starting Wed 8/5/2020, Normal      Insulin Pen Needle (UNIFINE PENTIPS PLUS) 31G X 6 MM MISC 1 Device by Does not apply route 4 (four) times a day, Starting Mon 6/5/2017, Historical Med      Insulin Regular Human, Conc, (HumuLIN R U-500 KwikPen) 500 units/mL CONCENTRATED U-500 injection pen Inject 40 Units under the skin 3 (three) times a day before meals, Starting Fri 4/16/2021, Normal      Lancets MISC 1 Device by Does not apply route 4 (four) times a day, Historical Med      lidocaine (LIDODERM) 5 % Apply 1 patch topically daily Remove & Discard patch within 12 hours or as directed by MD, Starting Sun 9/20/2020, Print      Lidocaine Viscous HCl (XYLOCAINE) 2 % mucosal solution Swish and swallow 15 mL 4 (four) times a day as needed (abdominal or esophageal pain), Starting Sat 5/29/2021, Normal      lisinopril (ZESTRIL) 40 mg tablet TAKE ONE TABLET TWO TIMES A DAY, Normal      Magnesium 400 MG TABS Take by mouth, Historical Med      meclizine (ANTIVERT) 25 mg tablet Take 1 tablet (25 mg total) by mouth every 8 (eight) hours as needed for dizziness, Starting Sun 10/7/2018, Print      metoprolol succinate (TOPROL-XL) 200 MG 24 hr tablet TAKE 1 TABLET EVERY DAY BY ORAL ROUTE, Normal      naproxen (NAPROSYN) 500 mg tablet Take 1 tablet (500 mg total) by mouth 2 (two) times a day with meals, Starting Thu 6/10/2021, Normal      omeprazole (PriLOSEC) 40 MG capsule Take 1 capsule (40 mg total) by mouth daily, Starting Tue 5/18/2021, Normal      ondansetron (ZOFRAN-ODT) 4 mg disintegrating tablet Take 1 tablet (4 mg total) by mouth every 6 (six) hours as needed for nausea or vomiting, Starting Sat 5/29/2021, Normal      pioglitazone (ACTOS) 30 mg tablet Take 1 tablet (30 mg total) by mouth daily, Starting Thu 12/3/2020, Normal      pregabalin (LYRICA) 75 mg capsule TAKE ONE CAPSULE EVERY DAY, Normal      spironolactone (ALDACTONE) 25 mg tablet Take 25 mg by mouth daily , Historical Med      zolpidem (AMBIEN) 10 mg tablet TAKE 1 TABLET (10 MG TOTAL) BY MOUTH DAILY AT BEDTIME AS NEEDED FOR SLEEP, Starting Fri 1/29/2021, Normal           No discharge procedures on file      PDMP Review       Value Time User    PDMP Reviewed  Yes 3/8/2021  9:59 PM Bel Rios DO          ED Provider  Electronically Signed by           John Rain DO  06/20/21 Sophia Lucas 54 Servando Andrade DO  06/20/21 1012

## 2021-06-20 NOTE — DISCHARGE INSTRUCTIONS
It is important that you return to the ED if you develop worsening swelling to the eye, increasing pain in the eye, change in vision, or any other concerning symptoms

## 2021-06-20 NOTE — ED PROVIDER NOTES
History  Chief Complaint   Patient presents with    Eye Re-Evaluation     pt returns for further evaluation of left eye pain     Patient is a 67-year-old female who presents for left eyelid redness and swelling  Patient was seen here earlier today and diagnosed with preseptal cellulitis and started on Augmentin  After reviewing her case, I was concerned about the potential for orbital cellulitis given her mild pain with movement of her eye to the left  I spoke with the patient the phone and told her that I believed her symptoms were secondary to infection from her stye however I encouraged her to come back for a CT to definitively rule out orbital cellulitis  The patient denies any change in her symptoms since this morning  Prior to Admission Medications   Prescriptions Last Dose Informant Patient Reported? Taking?    ALPRAZolam (XANAX) 0 5 mg tablet  Self No No   Sig: "ONE-HALF" TABLET TWO TIMES A DAY   Blood Pressure Monitoring (Sphygmomanometer) MISC  Self No No   Sig: Use 2 (two) times a day   [facility] IRON 240 (27 Fe) MG tablet  Self No No   Sig: TAKE 1 TABLET (240 MG TOTAL) BY MOUTH 3 (THREE) TIMES A DAY WITH MEALS   Dulaglutide (Trulicity) 3 UX/8 6YV SOPN  Self No No   Sig: Inject 0 5 mL (3 mg total) under the skin once a week   Insulin Pen Needle (UNIFINE PENTIPS PLUS) 31G X 6 MM MISC  Self Yes No   Si Device by Does not apply route 4 (four) times a day   Insulin Regular Human, Conc, (HumuLIN R U-500 KwikPen) 500 units/mL CONCENTRATED U-500 injection pen  Self No No   Sig: Inject 40 Units under the skin 3 (three) times a day before meals   Lancets MISC  Self Yes No   Si Device by Does not apply route 4 (four) times a day   Lidocaine Viscous HCl (XYLOCAINE) 2 % mucosal solution   No No   Sig: Swish and swallow 15 mL 4 (four) times a day as needed (abdominal or esophageal pain)   Magnesium 400 MG TABS  Self Yes No   Sig: Take by mouth   aluminum-magnesium hydroxide-simethicone (MAALOX) 203-990-13 MG/5ML SUSP   No No   Sig: Take 20 mL by mouth 4 (four) times a day (before meals and at bedtime)   amLODIPine (NORVASC) 10 mg tablet  Self Yes No   Sig: Take 10 mg by mouth daily   amoxicillin-clavulanate (AUGMENTIN) 875-125 mg per tablet   No No   Sig: Take 1 tablet by mouth every 12 (twelve) hours for 7 days   atorvastatin (LIPITOR) 40 mg tablet  Self No No   Sig: Take 1 tablet (40 mg total) by mouth daily   bacitracin topical ointment 500 units/g topical ointment  Self No No   Sig: Apply to bilateral nares twice per day     Patient not taking: Reported on 4/30/2021   cyclobenzaprine (FLEXERIL) 10 mg tablet  Self No No   Sig: Take 1 tablet (10 mg total) by mouth 3 (three) times a day as needed for muscle spasms for up to 10 days   dexamethasone (DECADRON) 1 mg tablet  Self No No   Sig: Take 1 tablet (1 mg total) by mouth 2 (two) times a day with meals Take tablet between 10-11pm and then have lab next day in the morning 7-9am    diclofenac (VOLTAREN) 75 mg EC tablet  Self No No   Sig: Take 1 tablet (75 mg total) by mouth 2 (two) times a day   doxazosin (CARDURA) 8 MG tablet  Self No No   Sig: Take 1 tablet (8 mg total) by mouth daily   ergocalciferol (VITAMIN D2) 50,000 units  Self No No   Sig: Take 1 capsule (50,000 Units total) by mouth 2 (two) times a week   famotidine (PEPCID) 20 mg tablet   No No   Sig: Take 1 tablet (20 mg total) by mouth 2 (two) times a day   fenofibrate (TRICOR) 145 mg tablet  Self No No   Sig: TAKE ONE TABLET EVERY DAY   fluticasone-umeclidinium-vilanterol (Trelegy Ellipta) 100-62 5-25 MCG/INH inhaler   No No   Sig: Inhale 1 puff daily Rinse mouth after use    hydrALAZINE (APRESOLINE) 50 mg tablet  Self No No   Sig: TAKE TWO TABLETS (100MG) TWO TIMES A DAY   hydrochlorothiazide (HYDRODIURIL) 25 mg tablet  Self No No   Sig: TAKE ONE TABLET EVERY DAY   ibuprofen (MOTRIN) 800 mg tablet  Self No No   Sig: Take 1 tablet (800 mg total) by mouth every 6 (six) hours as needed for moderate pain   lidocaine (LIDODERM) 5 %  Self No No   Sig: Apply 1 patch topically daily Remove & Discard patch within 12 hours or as directed by MD   lisinopril (ZESTRIL) 40 mg tablet  Self No No   Sig: TAKE ONE TABLET TWO TIMES A DAY   meclizine (ANTIVERT) 25 mg tablet  Self No No   Sig: Take 1 tablet (25 mg total) by mouth every 8 (eight) hours as needed for dizziness   metoprolol succinate (TOPROL-XL) 200 MG 24 hr tablet  Self No No   Sig: TAKE 1 TABLET EVERY DAY BY ORAL ROUTE   naproxen (NAPROSYN) 500 mg tablet   No No   Sig: Take 1 tablet (500 mg total) by mouth 2 (two) times a day with meals   omeprazole (PriLOSEC) 40 MG capsule   No No   Sig: Take 1 capsule (40 mg total) by mouth daily   ondansetron (ZOFRAN-ODT) 4 mg disintegrating tablet   No No   Sig: Take 1 tablet (4 mg total) by mouth every 6 (six) hours as needed for nausea or vomiting   pioglitazone (ACTOS) 30 mg tablet  Self No No   Sig: Take 1 tablet (30 mg total) by mouth daily   pregabalin (LYRICA) 75 mg capsule  Self No No   Sig: TAKE ONE CAPSULE EVERY DAY   spironolactone (ALDACTONE) 25 mg tablet  Self Yes No   Sig: Take 25 mg by mouth daily    zolpidem (AMBIEN) 10 mg tablet  Self No No   Sig: TAKE 1 TABLET (10 MG TOTAL) BY MOUTH DAILY AT BEDTIME AS NEEDED FOR SLEEP      Facility-Administered Medications: None       Past Medical History:   Diagnosis Date    Anxiety     Aortic aneurysm (HCC)     Arthritis     Depression     Diabetes mellitus (HCC)     Fibromyalgia     GERD (gastroesophageal reflux disease)     GERD (gastroesophageal reflux disease)     H/O cardiovascular stress test 2018    no ischemia  EF 70%   H/O echocardiogram 2019    EF 60%  Mild LVH  trivial effusion      Hyperlipidemia     Hypertension     Migraines     Psychiatric disorder     anxiety       Past Surgical History:   Procedure Laterality Date    BACK SURGERY      Lumbar epidural steroid injection    CARPAL TUNNEL RELEASE Left      SECTION  CHOLECYSTECTOMY      COLONOSCOPY      incomplete    HYSTERECTOMY      Total    OVARIAN CYST REMOVAL      TUBAL LIGATION         Family History   Problem Relation Age of Onset    Hypertension Mother    Leticia Elizondo Arthritis Mother     Diabetes Father     Other Sister         renal cell carcinoma    Diabetes Other      I have reviewed and agree with the history as documented  E-Cigarette/Vaping    E-Cigarette Use Never User      E-Cigarette/Vaping Substances    Nicotine No     THC No     CBD No     Flavoring No     Other No     Unknown No      Social History     Tobacco Use    Smoking status: Current Every Day Smoker     Packs/day: 1 00     Years: 30 00     Pack years: 30 00     Types: Cigarettes    Smokeless tobacco: Never Used   Vaping Use    Vaping Use: Never used   Substance Use Topics    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   Drug use: Yes     Types: Marijuana     Comment: medical; seldom use       Review of Systems   Constitutional: Negative for chills, diaphoresis and fever  HENT: Negative for congestion, sinus pressure, sore throat and trouble swallowing  Eyes: Positive for pain and redness (eyelid)  Negative for discharge and itching  Respiratory: Negative for cough, chest tightness, shortness of breath and wheezing  Cardiovascular: Negative for chest pain, palpitations and leg swelling  Gastrointestinal: Negative for abdominal distention, abdominal pain, blood in stool, diarrhea, nausea and vomiting  Endocrine: Negative for polyphagia and polyuria  Genitourinary: Negative for difficulty urinating, dysuria, flank pain, hematuria, pelvic pain and vaginal bleeding  Musculoskeletal: Negative for arthralgias and back pain  Skin: Negative for rash  Neurological: Negative for dizziness, syncope, weakness, light-headedness and headaches  Physical Exam  Physical Exam  Vitals and nursing note reviewed     Constitutional:       General: She is not in acute distress  Appearance: She is well-developed  HENT:      Head: Normocephalic and atraumatic  Right Ear: External ear normal       Left Ear: External ear normal       Mouth/Throat:      Pharynx: No oropharyngeal exudate  Eyes:      Conjunctiva/sclera: Conjunctivae normal       Pupils: Pupils are equal, round, and reactive to light  Comments: Redness/Swelling to left upper eye lid  Pain with movement of eye to the left  Stye present to upper eye lid    Cardiovascular:      Rate and Rhythm: Normal rate and regular rhythm  Heart sounds: Normal heart sounds  No murmur heard  No friction rub  No gallop  Pulmonary:      Effort: Pulmonary effort is normal  No respiratory distress  Breath sounds: Normal breath sounds  No wheezing or rales  Abdominal:      General: There is no distension  Palpations: Abdomen is soft  Tenderness: There is no abdominal tenderness  There is no guarding  Musculoskeletal:         General: No tenderness or deformity  Normal range of motion  Cervical back: Normal range of motion and neck supple  Lymphadenopathy:      Cervical: No cervical adenopathy  Skin:     General: Skin is warm and dry  Neurological:      Mental Status: She is alert and oriented to person, place, and time  Cranial Nerves: No cranial nerve deficit  Sensory: No sensory deficit  Motor: No abnormal muscle tone           Vital Signs  ED Triage Vitals [06/20/21 1104]   Temperature Pulse Respirations Blood Pressure SpO2   98 °F (36 7 °C) 85 16 (!) 195/90 99 %      Temp Source Heart Rate Source Patient Position - Orthostatic VS BP Location FiO2 (%)   Oral Monitor Lying Left arm --      Pain Score       --           Vitals:    06/20/21 1104   BP: (!) 195/90   Pulse: 85   Patient Position - Orthostatic VS: Lying         Visual Acuity      ED Medications  Medications   iohexol (OMNIPAQUE) 350 MG/ML injection (SINGLE-DOSE) 85 mL (85 mL Intravenous Given 6/20/21 1117)   potassium chloride (K-DUR,KLOR-CON) CR tablet 40 mEq (40 mEq Oral Given 6/20/21 1129)       Diagnostic Studies  Results Reviewed     Procedure Component Value Units Date/Time    Basic metabolic panel [[id number]]  (Abnormal) Collected: 06/20/21 1102    Lab Status: Final result Specimen: Blood from Arm, Left Updated: 06/20/21 1123     Sodium 141 mmol/L      Potassium 3 1 mmol/L      Chloride 103 mmol/L      CO2 31 mmol/L      ANION GAP 7 mmol/L      BUN 11 mg/dL      Creatinine 0 58 mg/dL      Glucose 236 mg/dL      Calcium 8 9 mg/dL      eGFR 120 ml/min/1 73sq m     Narrative:      [place] guidelines for Chronic Kidney Disease (CKD):     Stage 1 with normal or high GFR (GFR > 90 mL/min/1 73 square meters)    Stage 2 Mild CKD (GFR = 60-89 mL/min/1 73 square meters)    Stage 3A Moderate CKD (GFR = 45-59 mL/min/1 73 square meters)    Stage 3B Moderate CKD (GFR = 30-44 mL/min/1 73 square meters)    Stage 4 Severe CKD (GFR = 15-29 mL/min/1 73 square meters)    Stage 5 End Stage CKD (GFR <15 mL/min/1 73 square meters)  Note: GFR calculation is accurate only with a steady state creatinine    CBC and differential [[id number]]  (Normal) Collected: 06/20/21 1102    Lab Status: Final result Specimen: Blood from Arm, Left Updated: 06/20/21 1109     WBC 9 73 Thousand/uL      RBC 4 87 Million/uL      Hemoglobin 13 5 g/dL      Hematocrit 41 1 %      MCV 84 fL      MCH 27 7 pg      MCHC 32 8 g/dL      RDW 14 4 %      MPV 10 4 fL      Platelets 660 Thousands/uL      Neutrophils Relative 65 %      Immat GRANS % 0 %      Lymphocytes Relative 24 %      Monocytes Relative 8 %      Eosinophils Relative 3 %      Basophils Relative 0 %      Neutrophils Absolute 6 34 Thousands/µL      Immature Grans Absolute 0 02 Thousand/uL      Lymphocytes Absolute 2 31 Thousands/µL      Monocytes Absolute 0 75 Thousand/µL      Eosinophils Absolute 0 28 Thousand/µL      Basophils Absolute 0 03 Thousands/µL CT facial bones with contrast   Final Result by Zachariah Driscoll MD (06/20 1125)   Stable mild bilateral exophthalmos  Mild left periorbital preseptal soft tissue swelling may represent cellulitis  No postseptal inflammatory changes  The study was marked in [facility] for immediate notification  Workstation performed: [id number]                    Procedures  Procedures         ED Course  ED Course as of Jun 20 1135   Sun Jun 20, 2021   1101 CT tech recommended CT facial bones instead of CT orbits if concern for lymph node involvement  Will order  MDM  Number of Diagnoses or Management Options  Hypokalemia  Preseptal cellulitis of left eye  Diagnosis management comments: 60-year-old female presenting for redness swelling, to left eyelid, [place]  BS diarrhea earlier last night  Worsening redness and pain today  Mild pain with extraocular motion to the left  Pupils equal and reactive  Patient was seen her earlier, started on Augmentin, after each thinking about case, and encouraged patient to come back for CT to out over a cellulitis  Will obtain basic labs, will get CT  CT shows what appears to be preseptal cellulitis, no postseptal changes  Told patient to continue the Augmentin that was prescribed to her this morning  Disposition  Final diagnoses:   Preseptal cellulitis of left eye   Hypokalemia     Time reflects when diagnosis was documented in both MDM as applicable and the Disposition within this note     Time User Action Codes Description Comment    6/20/2021 11:27 AM Rajinder Grey Add [K95 [id number]] Preseptal cellulitis of left eye     6/20/2021 11:30 AM Rajinder Grey Add [E87 6] Hypokalemia       ED Disposition     ED Disposition Condition Date/Time Comment    Discharge Stable [place] Jun 20, 2021 11:27 AM Mehreen Boss discharge to home/self care              Follow-up Information     Follow up With Specialties Details Why [address] [facility] Hakeem Guevara MD Ophthalmology Schedule an appointment as soon as possible for a visit  For follow up of preceptal cellulits Percy Del Rio [address] [place] [address]  [phone]            Patient's Medications   Discharge Prescriptions    No medications on file     No discharge procedures on file      PDMP Review       Value Time User    PDMP Reviewed  Yes 3/8/2021  9:59 PM July Ruiz DO          ED Provider  Electronically Signed by           Fior Balderrama DO  06/20/21 1131

## 2021-06-20 NOTE — DISCHARGE INSTRUCTIONS
Continue to take the augmentin that was prescribed to you as directed  Follow up with the ophthalmologist in the next 1-2 days for syptoms re-check    Return to ED if symptoms worsen

## 2021-06-24 ENCOUNTER — CONSULT (OUTPATIENT)
Dept: OBGYN CLINIC | Facility: CLINIC | Age: 56
End: 2021-06-24
Payer: MEDICARE

## 2021-06-24 VITALS
HEART RATE: 88 BPM | DIASTOLIC BLOOD PRESSURE: 113 MMHG | BODY MASS INDEX: 30.78 KG/M2 | SYSTOLIC BLOOD PRESSURE: 229 MMHG | WEIGHT: 207.8 LBS | HEIGHT: 69 IN

## 2021-06-24 DIAGNOSIS — IMO0002 UNCONTROLLED TYPE 2 DIABETES MELLITUS WITH COMPLICATION, WITH LONG-TERM CURRENT USE OF INSULIN: ICD-10-CM

## 2021-06-24 DIAGNOSIS — M65.341 TRIGGER RING FINGER OF RIGHT HAND: Primary | ICD-10-CM

## 2021-06-24 DIAGNOSIS — M65.331 TRIGGER FINGER, RIGHT MIDDLE FINGER: ICD-10-CM

## 2021-06-24 DIAGNOSIS — I10 UNCONTROLLED HYPERTENSION: ICD-10-CM

## 2021-06-24 PROCEDURE — 99214 OFFICE O/P EST MOD 30 MIN: CPT | Performed by: SURGERY

## 2021-06-24 PROCEDURE — 20550 NJX 1 TENDON SHEATH/LIGAMENT: CPT | Performed by: SURGERY

## 2021-06-24 RX ORDER — LIDOCAINE HYDROCHLORIDE 10 MG/ML
1 INJECTION, SOLUTION INFILTRATION; PERINEURAL
Status: COMPLETED | OUTPATIENT
Start: 2021-06-24 | End: 2021-06-24

## 2021-06-24 RX ORDER — DEXAMETHASONE SODIUM PHOSPHATE 100 MG/10ML
40 INJECTION INTRAMUSCULAR; INTRAVENOUS
Status: COMPLETED | OUTPATIENT
Start: 2021-06-24 | End: 2021-06-24

## 2021-06-24 RX ADMIN — LIDOCAINE HYDROCHLORIDE 1 ML: 10 INJECTION, SOLUTION INFILTRATION; PERINEURAL at 09:40

## 2021-06-24 RX ADMIN — DEXAMETHASONE SODIUM PHOSPHATE 40 MG: 100 INJECTION INTRAMUSCULAR; INTRAVENOUS at 09:40

## 2021-06-24 NOTE — PROGRESS NOTES
Belén WEST  Attending, Orthopaedic Surgery  Hand, Wrist, and Elbow Surgery  [facility]      ORTHOPAEDIC HAND, WRIST, AND ELBOW OFFICE  VISIT       ASSESSMENT/PLAN:      54 yo female with right long and ring finger trigger fingers, worse on the right right  Patient is an uncontrolled diabetic, last A1C 12 9  She notes the lowest it has been was around 9  We discussed the anatomy and physiology of trigger fingers along with treatment options  Typically initial treatment involves steroid injection, however we need to proceed cautiously in the setting of her diabetes  Risks, benefits, and alternatives were discussed and decision was made to proceed with initial TF injection today  Patient agreed to monitor her sugars regularly  If this helps and she tolerates it well we can consider a second injection in about 6 weeks  Briefly discussed the possibility of surgical intervention, however her A1C would need to be 8 or below in order to proceed  Follow up in about 6 weeks  Patient did have elevated bp on exam today but notes she did not take her medications today, she was advised to do so and follow up with her PCP  If she becomes symptomatic she should present to the ED  The patient verbalized understanding of exam findings and treatment plan  We engaged in the shared decision-making process and treatment options were discussed at length with the patient  Surgical and conservative management discussed today along with risks and benefits  Diagnoses and all orders for this visit:    Trigger ring finger of right hand  -     Ambulatory referral to Hand Surgery  -     Ambulatory referral to PT/OT hand therapy; Future  -     Hand/upper extremity injection    Trigger finger, right middle finger  -     Ambulatory referral to PT/OT hand therapy;  Future    Uncontrolled hypertension    Uncontrolled type 2 diabetes mellitus with complication, with long-term current use of insulin [facility])        Follow Up:  Return in about 6 weeks (around 8/5/2021) for Recheck  To Do Next Visit:  Re-evaluation of current issue      General Discussions:  Trigger Finger: The anatomy and physiology of trigger finger was discussed with the patient today in the office  Edema and increased contact pressure within the flexor tendons at the A1 pulley can cause pain, crepitation, and triggering or locking of the digit resulting in limitation of function  Symptoms can occur at anytime but are typically worse in the morning or after a brief rest from repetitive activity  Treatment options include resting/nighttime MP blocking splints to decrease edema, oral anti-inflammatory medications, home or formal therapy exercises, up to 2 steroid injections within the tendon sheath, or surgical release  While majority of patients do respond to conservative treatment, up to 20% may require surgical release        ____________________________________________________________________________________________________________________________________________      CHIEF COMPLAINT:  Chief Complaint   Patient presents with    Right Middle Finger - Locking, Swelling    Right Ring Finger - Pain, Locking, Swelling       SUBJECTIVE:  Deandre Tatum is a 55y o  year old RHD female seen in consultation requested by Dr Gonzalez Rodas who presents for evaluation of right long and ring finger trigger fingers  Symptoms have been ongoing for several months and include clicking, locking, and A1 pulley pain worse in the ring finger  Patient has history of uncontrolled diabetes despite polytherapy on multiple insulins  She also has diabetes related changes in her eyes and peripheral nerves  She denies any prior treatments for TF but notes she has gotten back injections over the last 4 years and tolerated the steroids well         Pain/symptom timing:  Worse during the day when active  Pain/symptom context:  Worse with activites and work  Pain/symptom modifying factors:  Rest makes better, activities make worse  Pain/symptom associated signs/symptoms: none    Prior treatment   · NSAIDsYes   · Injections No   · Bracing/Orthotics No    Physical Therapy No     I have personally reviewed all the relevant PMH, PSH, SH, FH, Medications and allergies      PAST MEDICAL HISTORY:  Past Medical History:   Diagnosis Date    Anxiety     Aortic aneurysm ([facility] )     Arthritis     Depression     Diabetes mellitus ([facility] )     Fibromyalgia     GERD (gastroesophageal reflux disease)     GERD (gastroesophageal reflux disease)     H/O cardiovascular stress test 2018    no ischemia  EF 70%   H/O echocardiogram 2019    EF 60%  Mild LVH  trivial effusion   Hyperlipidemia     Hypertension     Migraines     Psychiatric disorder     anxiety       PAST SURGICAL HISTORY:  Past Surgical History:   Procedure Laterality Date    BACK SURGERY      Lumbar epidural steroid injection    CARPAL TUNNEL RELEASE Left      SECTION      CHOLECYSTECTOMY      COLONOSCOPY      incomplete    HYSTERECTOMY      Total    OVARIAN CYST REMOVAL      TUBAL LIGATION         FAMILY HISTORY:  Family History   Problem Relation Age of Onset    Hypertension Mother    Dailey Arthritis Mother     Diabetes Father     Other Sister         renal cell carcinoma    Diabetes Other        SOCIAL HISTORY:  Social History     Tobacco Use    Smoking status: Current Every Day Smoker     Packs/day: 1 00     Years: 30 00     Pack years: 30 00     Types: Cigarettes    Smokeless tobacco: Never Used   Vaping Use    Vaping Use: Never used   Substance Use Topics    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       Drug use: Yes     Types: Marijuana     Comment: medical; seldom use       MEDICATIONS:    Current Outpatient Medications:     ALPRAZolam (XANAX) 0 5 mg tablet, "ONE-HALF" TABLET TWO TIMES A DAY, Disp: 90 tablet, Rfl: 1    aluminum-magnesium hydroxide-simethicone (MAALOX) 042-035-93 MG/5ML SUSP, Take 20 mL by mouth 4 (four) times a day (before meals and at bedtime), Disp: 355 mL, Rfl: 0    amLODIPine (NORVASC) 10 mg tablet, Take 10 mg by mouth daily, Disp: , Rfl:     amoxicillin-clavulanate (AUGMENTIN) 875-125 mg per tablet, Take 1 tablet by mouth every 12 (twelve) hours for 7 days, Disp: 14 tablet, Rfl: 0    atorvastatin (LIPITOR) 40 mg tablet, Take 1 tablet (40 mg total) by mouth daily, Disp: 90 tablet, Rfl: 2    Blood Pressure Monitoring (Sphygmomanometer) MISC, Use 2 (two) times a day, Disp: 1 each, Rfl: 0    [employer] IRON 240 (27 Fe) MG tablet, TAKE 1 TABLET (240 MG TOTAL) BY MOUTH 3 (THREE) TIMES A DAY WITH MEALS, Disp: 90 tablet, Rfl: 1    dexamethasone (DECADRON) 1 mg tablet, Take 1 tablet (1 mg total) by mouth 2 (two) times a day with meals Take tablet between 10-11pm and then have lab next day in the morning 7-9am , Disp: 1 tablet, Rfl: 0    diclofenac (VOLTAREN) 75 mg EC tablet, Take 1 tablet (75 mg total) by mouth 2 (two) times a day, Disp: 60 tablet, Rfl: 0    doxazosin (CARDURA) 8 MG tablet, Take 1 tablet (8 mg total) by mouth daily, Disp: 90 tablet, Rfl: 3    Dulaglutide (Trulicity) 3 MD/7 1VO SOPN, Inject 0 5 mL (3 mg total) under the skin once a week, Disp: 6 mL, Rfl: 1    ergocalciferol (VITAMIN D2) 50,000 units, Take 1 capsule (50,000 Units total) by mouth 2 (two) times a week, Disp: 24 capsule, Rfl: 1    famotidine (PEPCID) 20 mg tablet, Take 1 tablet (20 mg total) by mouth 2 (two) times a day, Disp: 30 tablet, Rfl: 0    fenofibrate (TRICOR) 145 mg tablet, TAKE ONE TABLET EVERY DAY, Disp: 90 tablet, Rfl: 6    fluticasone-umeclidinium-vilanterol (Trelegy Ellipta) 100-62 5-25 MCG/INH inhaler, Inhale 1 puff daily Rinse mouth after use , Disp: 60 each, Rfl: 1    hydrALAZINE (APRESOLINE) 50 mg tablet, TAKE TWO TABLETS (100MG) TWO TIMES A DAY, Disp: 200 tablet, Rfl: 3    hydrochlorothiazide (HYDRODIURIL) 25 mg tablet, TAKE ONE TABLET EVERY DAY, Disp: 90 tablet, Rfl: 1    ibuprofen (MOTRIN) 800 mg tablet, Take 1 tablet (800 mg total) by mouth every 6 (six) hours as needed for moderate pain, Disp: 30 tablet, Rfl: 0    Insulin Pen Needle (UNIFINE PENTIPS PLUS) 31G X 6 MM MISC, 1 Device by Does not apply route 4 (four) times a day, Disp: , Rfl:     Insulin Regular Human, Conc, (HumuLIN R U-500 KwikPen) 500 units/mL CONCENTRATED U-500 injection pen, Inject 40 Units under the skin 3 (three) times a day before meals, Disp: 3 pen, Rfl: 3    Lancets MISC, 1 Device by Does not apply route 4 (four) times a day, Disp: , Rfl:     lidocaine (LIDODERM) 5 %, Apply 1 patch topically daily Remove & Discard patch within 12 hours or as directed by MD, Disp: 30 patch, Rfl: 0    Lidocaine Viscous HCl (XYLOCAINE) 2 % mucosal solution, Swish and swallow 15 mL 4 (four) times a day as needed (abdominal or esophageal pain), Disp: 100 mL, Rfl: 0    lisinopril (ZESTRIL) 40 mg tablet, TAKE ONE TABLET TWO TIMES A DAY, Disp: 180 tablet, Rfl: 1    Magnesium 400 MG TABS, Take by mouth, Disp: , Rfl:     meclizine (ANTIVERT) 25 mg tablet, Take 1 tablet (25 mg total) by mouth every 8 (eight) hours as needed for dizziness, Disp: 30 tablet, Rfl: 0    metoprolol succinate (TOPROL-XL) 200 MG 24 hr tablet, TAKE 1 TABLET EVERY DAY BY ORAL ROUTE, Disp: 50 tablet, Rfl: 3    naproxen (NAPROSYN) 500 mg tablet, Take 1 tablet (500 mg total) by mouth 2 (two) times a day with meals, Disp: 30 tablet, Rfl: 0    omeprazole (PriLOSEC) 40 MG capsule, Take 1 capsule (40 mg total) by mouth daily, Disp: 90 capsule, Rfl: 1    ondansetron (ZOFRAN-ODT) 4 mg disintegrating tablet, Take 1 tablet (4 mg total) by mouth every 6 (six) hours as needed for nausea or vomiting, Disp: 20 tablet, Rfl: 0    pioglitazone (ACTOS) 30 mg tablet, Take 1 tablet (30 mg total) by mouth daily, Disp: 90 tablet, Rfl: 1    pregabalin (LYRICA) 75 mg capsule, TAKE ONE CAPSULE EVERY DAY, Disp: 90 capsule, Rfl: 1    spironolactone (ALDACTONE) 25 mg tablet, Take 25 mg by mouth daily , Disp: , Rfl:     zolpidem (AMBIEN) 10 mg tablet, TAKE 1 TABLET (10 MG TOTAL) BY MOUTH DAILY AT BEDTIME AS NEEDED FOR SLEEP, Disp: 90 tablet, Rfl: 0    bacitracin topical ointment 500 units/g topical ointment, Apply to bilateral nares twice per day  (Patient not taking: Reported on 4/30/2021), Disp: 15 g, Rfl: 3    cyclobenzaprine (FLEXERIL) 10 mg tablet, Take 1 tablet (10 mg total) by mouth 3 (three) times a day as needed for muscle spasms for up to 10 days, Disp: 30 tablet, Rfl: 0    ALLERGIES:  Allergies   Allergen Reactions    Metformin Diarrhea    Clonidine Rash     Patch            REVIEW OF SYSTEMS:  Review of Systems   Constitutional: Negative for chills, fatigue and fever  HENT: Negative for hearing loss, nosebleeds and sore throat  Eyes: Positive for visual disturbance  Negative for redness  Respiratory: Negative for cough, shortness of breath and wheezing  Cardiovascular: Negative for chest pain, palpitations and leg swelling  Gastrointestinal: Negative for abdominal pain, nausea and vomiting  Endocrine: Negative for polydipsia and polyuria  Genitourinary: Negative for difficulty urinating, dysuria and hematuria  Musculoskeletal: Positive for arthralgias and joint swelling  Negative for myalgias  Skin: Negative for rash and wound  Neurological: Negative for speech difficulty, weakness, numbness and headaches  Psychiatric/Behavioral: Negative for decreased concentration and suicidal ideas  The patient is not nervous/anxious          VITALS:  Vitals:    06/24/21 0904   BP: (!) 229/113   Pulse: 88       LABS:  HgA1c:   Lab Results   Component Value Date    HGBA1C 12 9 (H) 04/02/2021     BMP:   Lab Results   Component Value Date    GLUCOSE 230 (H) 07/19/2015    CALCIUM 8 9 06/20/2021     07/19/2015    K 3 1 (L) 06/20/2021    CO2 31 06/20/2021     06/20/2021    BUN 11 06/20/2021    CREATININE 0 58 06/20/2021       _____________________________________________________  PHYSICAL EXAMINATION:  General: well developed and well nourished, alert, oriented times 3 and appears comfortable  Psychiatric: Normal  HEENT: Normocephalic, Atraumatic Trachea Midline, No torticollis  Pulmonary: No audible wheezing or respiratory distress   Abdomen/GI: Non tender, non distended   Cardiovascular: No pitting edema, 2+ radial pulse   Skin: No masses, erythema, lacerations, fluctation, ulcerations  Neurovascular: Sensation Intact to the Median, Ulnar, Radial Nerve, Motor Intact to the Median, Ulnar, Radial Nerve and Pulses Intact  Musculoskeletal: Normal, except as noted in detailed exam and in HPI  MUSCULOSKELETAL EXAMINATION:  right long finger:  Negative palpable nodule over the A1 pulley  Mild tenderness to palpation over A1 pulley  Positive catching  Positive clicking  right ring finger:  Negative palpable nodule over the A1 pulley  Positive tenderness to palpation over A1 pulley  Positive catching  Positive clicking  Slight flexion deformity of the long and ring finger PIP joints   ___________________________________________________  STUDIES REVIEWED:  No new studies to review         PROCEDURES PERFORMED:  Hand/upper extremity injection: R ring A1  Universal Protocol:  Consent: Verbal consent obtained  Risks and benefits: risks, benefits and alternatives were discussed  Consent given by: patient  Time out: Immediately prior to procedure a "time out" was called to verify the correct patient, procedure, equipment, support staff and site/side marked as required    Patient understanding: patient states understanding of the procedure being performed  Site marked: the operative site was marked  Required items: required blood products, implants, devices, and special equipment available  Patient identity confirmed: verbally with patient    Supporting Documentation  Indications: pain and therapeutic   Procedure Details  Condition:trigger finger Location: ring finger - R ring A1   Preparation: Patient was prepped and draped in the usual sterile fashion  Needle size: 25 G  Ultrasound guidance: no  Approach: volar  Medications administered: 1 mL lidocaine 1 %; 40 mg dexamethasone 100 mg/10 mL    Patient tolerance: patient tolerated the procedure well with no immediate complications  Dressing:  Sterile dressing applied              _____________________________________________________      Scribe Attestation    I,:  Veldon Claude, PA-C am acting as a scribe while in the presence of the attending physician :       I,:  Radha Louis MD personally performed the services described in this documentation    as scribed in my presence :

## 2021-07-07 ENCOUNTER — OFFICE VISIT (OUTPATIENT)
Dept: CARDIOLOGY CLINIC | Facility: CLINIC | Age: 56
End: 2021-07-07
Payer: MEDICARE

## 2021-07-07 VITALS
BODY MASS INDEX: 29.77 KG/M2 | OXYGEN SATURATION: 98 % | HEART RATE: 88 BPM | DIASTOLIC BLOOD PRESSURE: 80 MMHG | WEIGHT: 201 LBS | SYSTOLIC BLOOD PRESSURE: 130 MMHG | HEIGHT: 69 IN

## 2021-07-07 DIAGNOSIS — Z72.0 TOBACCO ABUSE: ICD-10-CM

## 2021-07-07 DIAGNOSIS — I71.2 THORACIC AORTIC ANEURYSM WITHOUT RUPTURE (HCC): ICD-10-CM

## 2021-07-07 DIAGNOSIS — E78.5 HYPERLIPIDEMIA ASSOCIATED WITH TYPE 2 DIABETES MELLITUS (HCC): Primary | ICD-10-CM

## 2021-07-07 DIAGNOSIS — E11.69 HYPERLIPIDEMIA ASSOCIATED WITH TYPE 2 DIABETES MELLITUS (HCC): Primary | ICD-10-CM

## 2021-07-07 DIAGNOSIS — I10 UNCONTROLLED HYPERTENSION: ICD-10-CM

## 2021-07-07 PROCEDURE — 99214 OFFICE O/P EST MOD 30 MIN: CPT | Performed by: INTERNAL MEDICINE

## 2021-07-07 RX ORDER — HYDRALAZINE HYDROCHLORIDE 50 MG/1
100 TABLET, FILM COATED ORAL 3 TIMES DAILY
Qty: 200 TABLET | Refills: 3 | Status: SHIPPED | OUTPATIENT
Start: 2021-07-07 | End: 2021-11-23 | Stop reason: HOSPADM

## 2021-07-07 RX ORDER — SIMVASTATIN 10 MG
10 TABLET ORAL
COMMUNITY
End: 2021-09-07 | Stop reason: ALTCHOICE

## 2021-07-07 RX ORDER — DICYCLOMINE HCL 20 MG
20 TABLET ORAL EVERY 6 HOURS
COMMUNITY
End: 2021-11-23 | Stop reason: HOSPADM

## 2021-07-07 NOTE — PROGRESS NOTES
Cardiology Follow up    Vilma Maynard  [id number]  1965  [facility]  [address] [address]      Diagnoses and all orders for this visit:    Hyperlipidemia associated with type 2 diabetes mellitus ([facility] [address] )    Uncontrolled hypertension    Thoracic aortic aneurysm without rupture ([facility] [address] )    Tobacco abuse    Other orders  -     simvastatin (ZOCOR) 10 mg tablet; Take 10 mg by mouth daily at bedtime  -     insulin lispro (HumaLOG) 100 units/mL injection; Inject 20 Units under the skin 20 units without meal, 40 units with meal  -     Insulin Glargine (BASAGLAR KWIKPEN SC); Inject 90 Units under the skin  -     dicyclomine (BENTYL) 20 mg tablet; Take 20 mg by mouth every 6 (six) hours      I had the pleasure of seeing Vilma Maynard for a follow up    History of the Presenting Illness, Discussion/Summary and my Plan are as follows:::    Sinai Montero is a pleasant 81-year-old with history of hypertension, diabetes, dyslipidemia and chronic and ongoing tobacco use and severe back pain-improved last visit  Also has a lot of stress in life and has been unable to quit smoking completely  She has had a history of thoracic aortic ectasia/aneurysms measuring about 4 2-4 3 cm since 2018-June 2020  Overall these have been relatively stable in size  Blood pressures have been poorly controlled and is on multiple antihypertensive medications  She has been ruled out for hyperaldosteronism and is back on spironolactone  Rhythm had palpitations with stress at the last visit-now resolved  She misses the medication at the most once a week  Has severe back pain at this time  Plan: Aortic aneurysm/aortic ectasia:  Overall similar in size-4 2-4 3 cm-since 2018-last test-June 2020 and Dobu stress echo -  Dec 2021  Next imaging in June 2022-will try MRA to avoid radiation      Continue statin  Smoking cessation was advised  Will also need better blood pressure control long-term  See below  Resistant Hypertension:  Currently on maximum dose amlodipine, lisinopril, 25 mg of hydrochlorothiazide, Aldactone 25 mg near maximum dose hydralazine,  improved blood pressure on 2nd reading-130/80  Elevated today  Negative for renal artery stenosis-2018, negative for hyperaldosteronism-2021  Normal TSH-June 2020  Increase hydralazine to 100 t i d  As necessary   Will keep us posted to portal blood pressures    Dyslipidemia:  Controlled on statin  Palpitations: Only when she is very stressed out, prior testing has included an echocardiogram-2017 that was unremarkable, EKGs have chronically demonstrated inferior T-wave inversions  Negative dobutamine stress echocardiogram for ischemia-February 2021    Smoking cessation was also advised  She is under lot of stress at this time  Also has poorly controlled diabetes-last A1c around 13    Follow-up in about 6 months  Results for Neema Matamoros (MRN [id number]) as of 7/7/2021 14:58   Ref  Range 5/26/2020 08:34 4/2/2021 07:59   Cholesterol Latest Ref Range: <=200 mg/dL 143 116   Triglycerides Latest Ref Range: <=150 mg/dL 157 (H) 133 7   HDL Latest Ref Range: >=50 mg/dL 44 45 (L)   Non-HDL Cholesterol Latest Units: mg/dl 99    LDL Calculated Latest Ref Range: 0 - 100 mg/dL 68 44     Results for Neema Matamoros (MRN [id number]) as of 7/7/2021 14:58   Ref  Range 5/26/2020 08:34 4/2/2021 07:59   Hemoglobin A1C  12 4 (H) 12 9 (H)         1  Hyperlipidemia associated with type 2 diabetes mellitus ([facility] [address] )     2  Uncontrolled hypertension     3  Thoracic aortic aneurysm without rupture ([facility] [address] )     4   Tobacco abuse       Patient Active Problem List   Diagnosis    Anxiety    Cardiac murmur    Carpal tunnel syndrome of left wrist    Change in bowel habit    Chronic pain disorder    Cough    Depression, recurrent ([address] )    Retinal hemorrhage due to secondary diabetes ([facility] [address] )    Diabetic peripheral neuropathy (HCC)    Dizziness    Uncontrolled hypertension    Fibromyalgia    Gastroesophageal reflux disease with esophagitis    Hemangioma of bone    Hyperlipidemia associated with type 2 diabetes mellitus (HCC)    Hypertension    Hypoestrogenism    Low back pain    Lumbar radiculopathy    Medically complex patient    Neuropathy    Non compliance with medical treatment    Noncompliance with diet and medication regimen    Overweight    Primary insomnia    Right-sided thoracic back pain    Sciatica of left side    Screening for colon cancer    Shingles    Thoracic radiculitis    Tobacco abuse    Uncontrolled type 2 diabetes mellitus with complication, with long-term current use of insulin (HCC)    Vertebral body hemangioma    Vertigo    Vaginal discharge    Yeast vaginitis    Recurrent vaginitis    Abnormal urinalysis    Thoracic aortic aneurysm without rupture (HCC)    Epigastric pain    Urinary tract infection with hematuria    Bacterial vaginosis    Abscess    Palpitations    Nasal vestibulitis    Orthopnea    CHANTALE (obstructive sleep apnea)    Diarrhea     Past Medical History:   Diagnosis Date    Anxiety     Aortic aneurysm (HCC)     Arthritis     Depression     Diabetes mellitus (HCC)     Fibromyalgia     GERD (gastroesophageal reflux disease)     GERD (gastroesophageal reflux disease)     H/O cardiovascular stress test 09/2018    no ischemia  EF 70%   H/O echocardiogram 01/2019    EF 60%  Mild LVH  trivial effusion      Hyperlipidemia     Hypertension     Migraines     Psychiatric disorder     anxiety     Social History     Socioeconomic History    Marital status: Legally      Spouse name: Not on file    Number of children: Not on file    Years of education: Not on file    Highest education level: Not on file   Occupational History    Not on file   Tobacco Use    Smoking status: Current Every Day Smoker     Packs/day: 1 00 Years: 30 00     Pack years: 30 00     Types: Cigarettes    Smokeless tobacco: Never Used   Vaping Use    Vaping Use: Never used   Substance and Sexual Activity    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   Drug use: Not Currently     Types: Marijuana     Comment: medical; seldom use    Sexual activity: Yes     Birth control/protection: Female Sterilization     Comment: Monogamous relationship with monogamous partner   Other Topics Concern    Not on file   Social History Narrative    Most recent tobacco use screenin2019    Do you currently or have you served in [employer]: No    Were you activated, into active duty, as a member of the [employer] or as a Reservist: No    Advance directive: No    Chewing tobacco: none    Alcohol intake: None    Marital status: Single    Live alone or with others: with others    Family history of heart disease:    aortic aneurysm    High cholesterol: Yes    High blood pressure: Yes    Exercise level: Heavy    Overweight: Yes    Obese: Yes    Diabetes: Yes    General stress level: High    Diet: Regular    Caffeine intake: Occasional    Guns present in home: No    Seat belts used routinely: Yes    Sexual orientation: Heterosexual    Sunscreen used routinely: No    Seat belt/car seat used routinely: Yes    Exercise-How often do you exercise: as tolerated    Do you have regular medical check ups: Yes    Do you have regular dental check ups: Yes    Illicit drugs-years of use:    recovery 23 years - HX of     Social Determinants of Health     Financial Resource Strain:     Difficulty of Paying Living Expenses:    Food Insecurity:     Worried About Running Out of Food in the Last Year:     Ran Out of Food in the Last Year:    Transportation Needs:     Lack of Transportation (Medical):      Lack of Transportation (Non-Medical):    Physical Activity:     Days of Exercise per Week:     Minutes of Exercise per Session:    Stress:     Feeling of Stress :    Social Connections:     Frequency of Communication with Friends and Family:     Frequency of Social Gatherings with Friends and Family:     Attends Congregation Services:     Active Member of Clubs or Organizations:     Attends Club or Organization Meetings:     Marital Status:    Intimate Partner Violence:     Fear of Current or Ex-Partner:     Emotionally Abused:     Physically Abused:     Sexually Abused:       Family History   Problem Relation Age of Onset    Hypertension Mother     Arthritis Mother     Diabetes Father     Other Sister         renal cell carcinoma    Diabetes Other      Past Surgical History:   Procedure Laterality Date    BACK SURGERY      Lumbar epidural steroid injection    CARPAL TUNNEL RELEASE Left      SECTION      CHOLECYSTECTOMY      COLONOSCOPY      incomplete    EYE SURGERY      HYSTERECTOMY      Total    OVARIAN CYST REMOVAL      TUBAL LIGATION         Current Outpatient Medications:     ALPRAZolam (XANAX) 0 5 mg tablet, "ONE-HALF" TABLET TWO TIMES A DAY, Disp: 90 tablet, Rfl: 1    aluminum-magnesium hydroxide-simethicone (MAALOX) 200-200-20 MG/5ML SUSP, Take 20 mL by mouth 4 (four) times a day (before meals and at bedtime), Disp: 355 mL, Rfl: 0    amLODIPine (NORVASC) 10 mg tablet, Take 10 mg by mouth daily, Disp: , Rfl:     atorvastatin (LIPITOR) 40 mg tablet, Take 1 tablet (40 mg total) by mouth daily, Disp: 90 tablet, Rfl: 2    Blood Pressure Monitoring (Sphygmomanometer) MISC, Use 2 (two) times a day, Disp: 1 each, Rfl: 0    cyclobenzaprine (FLEXERIL) 10 mg tablet, Take 1 tablet (10 mg total) by mouth 3 (three) times a day as needed for muscle spasms for up to 10 days, Disp: 30 tablet, Rfl: 0    dicyclomine (BENTYL) 20 mg tablet, Take 20 mg by mouth every 6 (six) hours, Disp: , Rfl:     Dulaglutide (Trulicity) 3 8 0SA SOPN, Inject 0 5 mL (3 mg total) under the skin once a week, Disp: 6 mL, Rfl: 1    ergocalciferol (VITAMIN D2) 50,000 units, Take 1 capsule (50,000 Units total) by mouth 2 (two) times a week, Disp: 24 capsule, Rfl: 1    famotidine (PEPCID) 20 mg tablet, Take 1 tablet (20 mg total) by mouth 2 (two) times a day, Disp: 30 tablet, Rfl: 0    fenofibrate (TRICOR) 145 mg tablet, TAKE ONE TABLET EVERY DAY, Disp: 90 tablet, Rfl: 6    fluticasone-umeclidinium-vilanterol (Trelegy Ellipta) 100-62 5-25 MCG/INH inhaler, Inhale 1 puff daily Rinse mouth after use , Disp: 60 each, Rfl: 1    hydrALAZINE (APRESOLINE) 50 mg tablet, TAKE TWO TABLETS (100MG) TWO TIMES A DAY, Disp: 200 tablet, Rfl: 3    hydrochlorothiazide (HYDRODIURIL) 25 mg tablet, TAKE ONE TABLET EVERY DAY, Disp: 90 tablet, Rfl: 1    ibuprofen (MOTRIN) 800 mg tablet, Take 1 tablet (800 mg total) by mouth every 6 (six) hours as needed for moderate pain, Disp: 30 tablet, Rfl: 0    Insulin Glargine (BASAGLAR KWIKPEN SC), Inject 90 Units under the skin, Disp: , Rfl:     insulin lispro (HumaLOG) 100 units/mL injection, Inject 20 Units under the skin 20 units without meal, 40 units with meal, Disp: , Rfl:     Insulin Pen Needle (UNIFINE PENTIPS PLUS) 31G X 6 MM MISC, 1 Device by Does not apply route 4 (four) times a day, Disp: , Rfl:     Insulin Regular Human, Conc, (HumuLIN R U-500 KwikPen) 500 units/mL CONCENTRATED U-500 injection pen, Inject 40 Units under the skin 3 (three) times a day before meals, Disp: 3 pen, Rfl: 3    Lancets MISC, 1 Device by Does not apply route 4 (four) times a day, Disp: , Rfl:     lidocaine (LIDODERM) 5 %, Apply 1 patch topically daily Remove & Discard patch within 12 hours or as directed by MD, Disp: 30 patch, Rfl: 0    Lidocaine Viscous HCl (XYLOCAINE) 2 % mucosal solution, Swish and swallow 15 mL 4 (four) times a day as needed (abdominal or esophageal pain), Disp: 100 mL, Rfl: 0    lisinopril (ZESTRIL) 40 mg tablet, TAKE ONE TABLET TWO TIMES A DAY, Disp: 180 tablet, Rfl: 1    Magnesium 400 MG TABS, Take by mouth, Disp: , Rfl:     meclizine (ANTIVERT) 25 mg tablet, Take 1 tablet (25 mg total) by mouth every 8 (eight) hours as needed for dizziness, Disp: 30 tablet, Rfl: 0    metoprolol succinate (TOPROL-XL) 200 MG 24 hr tablet, TAKE 1 TABLET EVERY DAY BY ORAL ROUTE, Disp: 50 tablet, Rfl: 3    naproxen (NAPROSYN) 500 mg tablet, Take 1 tablet (500 mg total) by mouth 2 (two) times a day with meals, Disp: 30 tablet, Rfl: 0    omeprazole (PriLOSEC) 40 MG capsule, Take 1 capsule (40 mg total) by mouth daily, Disp: 90 capsule, Rfl: 1    ondansetron (ZOFRAN-ODT) 4 mg disintegrating tablet, Take 1 tablet (4 mg total) by mouth every 6 (six) hours as needed for nausea or vomiting, Disp: 20 tablet, Rfl: 0    pioglitazone (ACTOS) 30 mg tablet, Take 1 tablet (30 mg total) by mouth daily, Disp: 90 tablet, Rfl: 1    pregabalin (LYRICA) 75 mg capsule, TAKE ONE CAPSULE EVERY DAY, Disp: 90 capsule, Rfl: 1    simvastatin (ZOCOR) 10 mg tablet, Take 10 mg by mouth daily at bedtime, Disp: , Rfl:     spironolactone (ALDACTONE) 25 mg tablet, Take 25 mg by mouth daily , Disp: , Rfl:     zolpidem (AMBIEN) 10 mg tablet, TAKE 1 TABLET (10 MG TOTAL) BY MOUTH DAILY AT BEDTIME AS NEEDED FOR SLEEP, Disp: 90 tablet, Rfl: 0    bacitracin topical ointment 500 units/g topical ointment, Apply to bilateral nares twice per day   (Patient not taking: Reported on 4/30/2021), Disp: 15 g, Rfl: 3    [employer] IRON 240 (27 Fe) MG tablet, TAKE 1 TABLET (240 MG TOTAL) BY MOUTH 3 (THREE) TIMES A DAY WITH MEALS, Disp: 90 tablet, Rfl: 1    dexamethasone (DECADRON) 1 mg tablet, Take 1 tablet (1 mg total) by mouth 2 (two) times a day with meals Take tablet between 10-11pm and then have lab next day in the morning 7-9am , Disp: 1 tablet, Rfl: 0    diclofenac (VOLTAREN) 75 mg EC tablet, Take 1 tablet (75 mg total) by mouth 2 (two) times a day, Disp: 60 tablet, Rfl: 0    doxazosin (CARDURA) 8 MG tablet, Take 1 tablet (8 mg total) by mouth daily, Disp: 90 tablet, Rfl: 3  Allergies Allergen Reactions    Metformin Diarrhea    Clonidine Rash     Patch      Vitals:    07/07/21 1427 07/07/21 1506   BP: 150/88 130/80   BP Location: Right arm    Pulse: 88    SpO2: 98%    Weight: 91 2 kg (201 lb)    Height: 5' 9" (1 753 m)          Imaging: No results found  Review of Systems:  Review of Systems   Constitutional: Negative  HENT: Negative  Eyes: Negative  Respiratory: Negative  Cardiovascular: Negative  Endocrine: Negative  Musculoskeletal: Positive for arthralgias and back pain  Physical Exam:    /80   Pulse 88   Ht 5' 9" (1 753 m)   Wt 91 2 kg (201 lb)   SpO2 98%   BMI 29 68 kg/m²   Physical Exam  Constitutional:       General: She is not in acute distress  Appearance: She is well-developed  She is not diaphoretic  HENT:      Head: Normocephalic and atraumatic  Eyes:      General: No scleral icterus  Right eye: No discharge  Left eye: No discharge  Conjunctiva/sclera: Conjunctivae normal       Pupils: Pupils are equal, round, and reactive to light  Neck:      Thyroid: No thyromegaly  Trachea: No tracheal deviation  Cardiovascular:      Rate and Rhythm: Normal rate and regular rhythm  Heart sounds: No murmur heard  No friction rub  Pulmonary:      Effort: Pulmonary effort is normal  No respiratory distress  Breath sounds: Normal breath sounds  No stridor  No wheezing  Abdominal:      General: Bowel sounds are normal  There is no distension  Palpations: Abdomen is soft  Tenderness: There is no abdominal tenderness  There is no guarding  Musculoskeletal:         General: No deformity  Cervical back: Normal range of motion  Skin:     General: Skin is warm  Coloration: Skin is not pale  Findings: No erythema or rash

## 2021-07-08 ENCOUNTER — EVALUATION (OUTPATIENT)
Dept: OCCUPATIONAL THERAPY | Facility: CLINIC | Age: 56
End: 2021-07-08
Payer: MEDICARE

## 2021-07-08 DIAGNOSIS — M65.331 TRIGGER FINGER, RIGHT MIDDLE FINGER: ICD-10-CM

## 2021-07-08 DIAGNOSIS — M65.341 TRIGGER RING FINGER OF RIGHT HAND: ICD-10-CM

## 2021-07-08 PROCEDURE — 97110 THERAPEUTIC EXERCISES: CPT | Performed by: OCCUPATIONAL THERAPIST

## 2021-07-08 PROCEDURE — 97165 OT EVAL LOW COMPLEX 30 MIN: CPT | Performed by: OCCUPATIONAL THERAPIST

## 2021-07-08 NOTE — PROGRESS NOTES
OT Evaluation     Today's date: 2021  Patient name: Gayle Mercado  : 1965  MRN: [id number]  Referring provider: Ryley Galvan PA-C  Dx:   Encounter Diagnosis     ICD-10-CM    1  Trigger ring finger of right hand  M65 [id number] Ambulatory referral to PT/OT hand therapy   2  Trigger finger, right middle finger  M65 331 Ambulatory referral to PT/OT hand therapy                  Assessment  Assessment details: Kelli demonstrates pain, stiffness, triggering, intrinsic tightness affecting her ADLs, IADLs  Goals  STG) Motion increased by 25% in 4-6 weeks  STG) Strength/Motor skills improved by 25% in 4-6 weeks  STG) Pain decreased by 25% in 4-6 weeks    LTG) ADL and IADL skills improved   LTG) Work skills improved  LTG) Leisure skills improved    Patient Goals: to have no  pain      Goals, plan of care and treatment condition discussed with patient  Patient expresses their understanding and questions regarding these issues were addressed  Plan  Patient would benefit from: OT eval and custom splinting  Planned modality interventions: TENS, thermotherapy: hydrocollator packs, thermotherapy: paraffin bath and ultrasound  Planned therapy interventions: neuromuscular re-education, motor coordination training, manual therapy, joint mobilization, Granado taping, strengthening, stretching, therapeutic activities, therapeutic exercise, functional ROM exercises, fine motor coordination training and orthotic fitting/training  Frequency: 2x week  Duration in visits: 10  Treatment plan discussed with: patient        Subjective Evaluation    History of Present Illness  Mechanism of injury: R LF RF Trigger fingers, CSI on   This started ~ 1 month ago  She reports locking but not requiring manual adjustment    Pain  Current pain ratin  At worst pain ratin          Objective     Active Range of Motion     Right Wrist   Wrist flexion: 70 degrees   Wrist extension: 75 degrees     Right Digits   Flexion   Middle MCP: 78    PIP: 80    DIP: 54  Ring     MCP: 80    PIP: 90    DIP: 32  Extension   Middle     MCP: 8    PIP: -14    DIP: 4  Ring     MCP: 14    PIP: -32    DIP: 8             Precautions: Diabetic  HEP: Blocking, hook fist, relative motion orthotic      Manuals             IASTM             PROM                                       Neuro Re-Ed             TGE             Eccentric FDS                                                                              Ther Ex                                                                                                                     Ther Activity                                       Gait Training                                       Modalities

## 2021-07-12 ENCOUNTER — OFFICE VISIT (OUTPATIENT)
Dept: OBGYN CLINIC | Facility: CLINIC | Age: 56
End: 2021-07-12
Payer: MEDICARE

## 2021-07-12 VITALS
BODY MASS INDEX: 29.77 KG/M2 | HEIGHT: 69 IN | DIASTOLIC BLOOD PRESSURE: 114 MMHG | HEART RATE: 98 BPM | SYSTOLIC BLOOD PRESSURE: 195 MMHG | WEIGHT: 201 LBS

## 2021-07-12 DIAGNOSIS — M65.331 TRIGGER FINGER, RIGHT MIDDLE FINGER: ICD-10-CM

## 2021-07-12 DIAGNOSIS — M65.341 TRIGGER FINGER, RIGHT RING FINGER: Primary | ICD-10-CM

## 2021-07-12 PROCEDURE — 99213 OFFICE O/P EST LOW 20 MIN: CPT | Performed by: SURGERY

## 2021-07-12 NOTE — PROGRESS NOTES
ASSESSMENT/PLAN:      56-year-old female with right long and right ring trigger fingers  The right long finger is triggering however this is not painful so no treatment is advised with this at this time  She has had some pain relief with right ring finger trigger finger cortisone injection given a little over 2 weeks ago  Explained to the patient that we would recommend at most 1 more cortisone injection for the right ring finger  She will continue with hand therapy  She will follow-up in 4 weeks for repeat evaluation  Explained to the patient again that surgery would not be offered for trigger finger release lesser hemoglobin A1c was at 8 0 or below, she will continue follow-up with her endocrinologist for management of her diabetes    The patient verbalized understanding of exam findings and treatment plan  We engaged in the shared decision-making process and treatment options were discussed at length with the patient  Surgical and conservative management discussed today along with risks and benefits  Diagnoses and all orders for this visit:    Trigger finger, right ring finger    Trigger finger, right middle finger          Follow Up:  Return in about 4 weeks (around 8/9/2021)  To Do Next Visit:  Re-evaluation of current issue    ____________________________________________________________________________________________________________________________________________      CHIEF COMPLAINT:  Chief Complaint   Patient presents with    Right Middle Finger - Follow-up    Right Ring Finger - Follow-up       SUBJECTIVE:  Anniece Angelucci is a 55y o  year old RHD female who presents to the office for follow up of right long and ring  trigger fingers  Patient reports pain relief  from cortisone injection about 3 weeks ago into the right ring finger A1 pulley region  She still has triggering in both fingers  She notes some irritation from the splint that was made for her by hand therapy    She continues to go to hand therapy without significant improvement from therapy  She continues see her endocrinologist she is not due for another hemoglobin A1c until August she reports that she has not ever had her hemoglobin A1c under 9 0  She denies any numbness tingling fevers or chills  I have personally reviewed all the relevant PMH, PSH, SH, FH, Medications and allergies  PAST MEDICAL HISTORY:  Past Medical History:   Diagnosis Date    Anxiety     Aortic aneurysm ([address] )     Arthritis     Depression     Diabetes mellitus (HCC)     Fibromyalgia     GERD (gastroesophageal reflux disease)     GERD (gastroesophageal reflux disease)     H/O cardiovascular stress test 2018    no ischemia  EF 70%   H/O echocardiogram 2019    EF 60%  Mild LVH  trivial effusion   Hyperlipidemia     Hypertension     Migraines     Psychiatric disorder     anxiety       PAST SURGICAL HISTORY:  Past Surgical History:   Procedure Laterality Date    BACK SURGERY      Lumbar epidural steroid injection    CARPAL TUNNEL RELEASE Left      SECTION      CHOLECYSTECTOMY      COLONOSCOPY      incomplete    EYE SURGERY      HYSTERECTOMY      Total    OVARIAN CYST REMOVAL      TUBAL LIGATION         FAMILY HISTORY:  Family History   Problem Relation Age of Onset    Hypertension Mother    Shannan Abbott Arthritis Mother     Diabetes Father     Other Sister         renal cell carcinoma    Diabetes Other        SOCIAL HISTORY:  Social History     Tobacco Use    Smoking status: Current Every Day Smoker     Packs/day: 1 00     Years: 30 00     Pack years: 30 00     Types: Cigarettes    Smokeless tobacco: Never Used   Vaping Use    Vaping Use: Never used   Substance Use Topics    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       Drug use: Not Currently     Types: Marijuana     Comment: medical; seldom use       MEDICATIONS:    Current Outpatient Medications:     ALPRAZolam (XANAX) 0 5 mg tablet, "ONE-HALF" TABLET TWO TIMES A DAY, Disp: 90 tablet, Rfl: 1    aluminum-magnesium hydroxide-simethicone (MAALOX) 453-344-42 MG/5ML SUSP, Take 20 mL by mouth 4 (four) times a day (before meals and at bedtime), Disp: 355 mL, Rfl: 0    amLODIPine (NORVASC) 10 mg tablet, Take 10 mg by mouth daily, Disp: , Rfl:     atorvastatin (LIPITOR) 40 mg tablet, Take 1 tablet (40 mg total) by mouth daily, Disp: 90 tablet, Rfl: 2    bacitracin topical ointment 500 units/g topical ointment, Apply to bilateral nares twice per day   (Patient not taking: Reported on 4/30/2021), Disp: 15 g, Rfl: 3    Blood Pressure Monitoring (Sphygmomanometer) MISC, Use 2 (two) times a day, Disp: 1 each, Rfl: 0    [employer] IRON 240 (27 Fe) MG tablet, TAKE 1 TABLET (240 MG TOTAL) BY MOUTH 3 (THREE) TIMES A DAY WITH MEALS, Disp: 90 tablet, Rfl: 1    cyclobenzaprine (FLEXERIL) 10 mg tablet, Take 1 tablet (10 mg total) by mouth 3 (three) times a day as needed for muscle spasms for up to 10 days, Disp: 30 tablet, Rfl: 0    dexamethasone (DECADRON) 1 mg tablet, Take 1 tablet (1 mg total) by mouth 2 (two) times a day with meals Take tablet between 10-11pm and then have lab next day in the morning 7-9am , Disp: 1 tablet, Rfl: 0    diclofenac (VOLTAREN) 75 mg EC tablet, Take 1 tablet (75 mg total) by mouth 2 (two) times a day, Disp: 60 tablet, Rfl: 0    dicyclomine (BENTYL) 20 mg tablet, Take 20 mg by mouth every 6 (six) hours, Disp: , Rfl:     doxazosin (CARDURA) 8 MG tablet, Take 1 tablet (8 mg total) by mouth daily, Disp: 90 tablet, Rfl: 3    Dulaglutide (Trulicity) 3 TB/1 9UN SOPN, Inject 0 5 mL (3 mg total) under the skin once a week, Disp: 6 mL, Rfl: 1    ergocalciferol (VITAMIN D2) 50,000 units, Take 1 capsule (50,000 Units total) by mouth 2 (two) times a week, Disp: 24 capsule, Rfl: 1    famotidine (PEPCID) 20 mg tablet, Take 1 tablet (20 mg total) by mouth 2 (two) times a day, Disp: 30 tablet, Rfl: 0    fenofibrate (TRICOR) 145 mg tablet, TAKE ONE TABLET EVERY DAY, Disp: 90 tablet, Rfl: 6    fluticasone-umeclidinium-vilanterol (Trelegy Ellipta) 100-62 5-25 MCG/INH inhaler, Inhale 1 puff daily Rinse mouth after use , Disp: 60 each, Rfl: 1    hydrALAZINE (APRESOLINE) 50 mg tablet, Take 2 tablets (100 mg total) by mouth 3 (three) times a day, Disp: 200 tablet, Rfl: 3    hydrochlorothiazide (HYDRODIURIL) 25 mg tablet, TAKE ONE TABLET EVERY DAY, Disp: 90 tablet, Rfl: 1    ibuprofen (MOTRIN) 800 mg tablet, Take 1 tablet (800 mg total) by mouth every 6 (six) hours as needed for moderate pain, Disp: 30 tablet, Rfl: 0    Insulin Glargine (BASAGLAR KWIKPEN SC), Inject 90 Units under the skin, Disp: , Rfl:     insulin lispro (HumaLOG) 100 units/mL injection, Inject 20 Units under the skin 20 units without meal, 40 units with meal, Disp: , Rfl:     Insulin Pen Needle (UNIFINE PENTIPS PLUS) 31G X 6 MM MISC, 1 Device by Does not apply route 4 (four) times a day, Disp: , Rfl:     Insulin Regular Human, Conc, (HumuLIN R U-500 KwikPen) 500 units/mL CONCENTRATED U-500 injection pen, Inject 40 Units under the skin 3 (three) times a day before meals, Disp: 3 pen, Rfl: 3    Lancets MISC, 1 Device by Does not apply route 4 (four) times a day, Disp: , Rfl:     lidocaine (LIDODERM) 5 %, Apply 1 patch topically daily Remove & Discard patch within 12 hours or as directed by MD, Disp: 30 patch, Rfl: 0    Lidocaine Viscous HCl (XYLOCAINE) 2 % mucosal solution, Swish and swallow 15 mL 4 (four) times a day as needed (abdominal or esophageal pain), Disp: 100 mL, Rfl: 0    lisinopril (ZESTRIL) 40 mg tablet, TAKE ONE TABLET TWO TIMES A DAY, Disp: 180 tablet, Rfl: 1    Magnesium 400 MG TABS, Take by mouth, Disp: , Rfl:     meclizine (ANTIVERT) 25 mg tablet, Take 1 tablet (25 mg total) by mouth every 8 (eight) hours as needed for dizziness, Disp: 30 tablet, Rfl: 0    metoprolol succinate (TOPROL-XL) 200 MG 24 hr tablet, TAKE 1 TABLET EVERY DAY BY ORAL ROUTE, Disp: 50 tablet, Rfl: 3   naproxen (NAPROSYN) 500 mg tablet, Take 1 tablet (500 mg total) by mouth 2 (two) times a day with meals, Disp: 30 tablet, Rfl: 0    omeprazole (PriLOSEC) 40 MG capsule, Take 1 capsule (40 mg total) by mouth daily, Disp: 90 capsule, Rfl: 1    ondansetron (ZOFRAN-ODT) 4 mg disintegrating tablet, Take 1 tablet (4 mg total) by mouth every 6 (six) hours as needed for nausea or vomiting, Disp: 20 tablet, Rfl: 0    pioglitazone (ACTOS) 30 mg tablet, Take 1 tablet (30 mg total) by mouth daily, Disp: 90 tablet, Rfl: 1    pregabalin (LYRICA) 75 mg capsule, TAKE ONE CAPSULE EVERY DAY, Disp: 90 capsule, Rfl: 1    simvastatin (ZOCOR) 10 mg tablet, Take 10 mg by mouth daily at bedtime, Disp: , Rfl:     spironolactone (ALDACTONE) 25 mg tablet, Take 25 mg by mouth daily , Disp: , Rfl:     zolpidem (AMBIEN) 10 mg tablet, TAKE 1 TABLET (10 MG TOTAL) BY MOUTH DAILY AT BEDTIME AS NEEDED FOR SLEEP, Disp: 90 tablet, Rfl: 0    ALLERGIES:  Allergies   Allergen Reactions    Metformin Diarrhea    Clonidine Rash     Patch        REVIEW OF SYSTEMS:  Review of Systems  See HPI    VITALS:  Vitals:    07/12/21 0820   BP: (!) 195/114   Pulse: 98       LABS:  HgA1c:   Lab Results   Component Value Date    HGBA1C 12 9 (H) 04/02/2021     BMP:   Lab Results   Component Value Date    GLUCOSE 230 (H) 07/19/2015    CALCIUM 8 9 06/20/2021     07/19/2015    K 3 1 (L) 06/20/2021    CO2 31 06/20/2021     06/20/2021    BUN 11 06/20/2021    CREATININE 0 58 06/20/2021       _____________________________________________________  PHYSICAL EXAMINATION:  General: well developed and well nourished, alert, oriented times 3 and appears comfortable  Psychiatric: Normal  HEENT: Normocephalic, Atraumatic Trachea Midline, No torticollis  Pulmonary: No audible wheezing or respiratory distress   Cardiovascular: No pitting edema, 2+ radial pulse   Abdominal/GI: abdomen non tender, non distended   Skin: No masses, erythema, lacerations, fluctation, ulcerations  Neurovascular: Sensation Intact to the Median, Ulnar, Radial Nerve, Motor Intact to the Median, Ulnar, Radial Nerve and Pulses Intact  Musculoskeletal: Normal, except as noted in detailed exam and in HPI        MUSCULOSKELETAL EXAMINATION:  Right hand:  No tenderness palpation over the long finger A1 pulley, there is tenderness palpation over the ring finger A1 pulley  Active triggering of the long finger and ring finger with flexion and extension  No visible deformities  Full range of motion throughout all digits  Brisk capillary refill in all digits    ___________________________________________________  STUDIES REVIEWED:  n/a          PROCEDURES PERFORMED:  Procedures  No Procedures performed today    _____________________________________________________      David Bhatia    I,:  Shoshana Garcia PA-C am acting as a scribe while in the presence of the attending physician :       I,:  Mariam Goldberg, MD personally performed the services described in this documentation    as scribed in my presence :

## 2021-07-13 ENCOUNTER — OFFICE VISIT (OUTPATIENT)
Dept: GASTROENTEROLOGY | Facility: AMBULARY SURGERY CENTER | Age: 56
End: 2021-07-13
Payer: MEDICARE

## 2021-07-13 ENCOUNTER — OFFICE VISIT (OUTPATIENT)
Dept: OCCUPATIONAL THERAPY | Facility: CLINIC | Age: 56
End: 2021-07-13
Payer: MEDICARE

## 2021-07-13 VITALS
DIASTOLIC BLOOD PRESSURE: 90 MMHG | SYSTOLIC BLOOD PRESSURE: 164 MMHG | WEIGHT: 203 LBS | BODY MASS INDEX: 30.07 KG/M2 | HEIGHT: 69 IN

## 2021-07-13 DIAGNOSIS — K21.00 GASTROESOPHAGEAL REFLUX DISEASE WITH ESOPHAGITIS, UNSPECIFIED WHETHER HEMORRHAGE: ICD-10-CM

## 2021-07-13 DIAGNOSIS — M65.331 TRIGGER FINGER, RIGHT MIDDLE FINGER: ICD-10-CM

## 2021-07-13 DIAGNOSIS — M65.341 TRIGGER RING FINGER OF RIGHT HAND: Primary | ICD-10-CM

## 2021-07-13 DIAGNOSIS — Z12.11 SCREENING FOR COLON CANCER: Primary | ICD-10-CM

## 2021-07-13 PROCEDURE — 97140 MANUAL THERAPY 1/> REGIONS: CPT | Performed by: OCCUPATIONAL THERAPIST

## 2021-07-13 PROCEDURE — 97112 NEUROMUSCULAR REEDUCATION: CPT | Performed by: OCCUPATIONAL THERAPIST

## 2021-07-13 PROCEDURE — 99214 OFFICE O/P EST MOD 30 MIN: CPT | Performed by: PHYSICIAN ASSISTANT

## 2021-07-13 RX ORDER — KETOROLAC TROMETHAMINE 5 MG/ML
SOLUTION OPHTHALMIC
COMMUNITY
Start: 2021-07-12 | End: 2021-09-07 | Stop reason: ALTCHOICE

## 2021-07-13 NOTE — PROGRESS NOTES
Daily Note     Today's date: 2021  Patient name: Lela Huber  : 1965  MRN: [id number]  Referring provider: Ramy Ely PA-C  Dx:   Encounter Diagnosis     ICD-10-CM    1  Trigger ring finger of right hand  M65 341    2  Trigger finger, right middle finger  M65 331                   Subjective: "They hurt a lot today"      Objective: See treatment diary below      Assessment: RF appears to be triggering at A2  Improved symptoms with eccentrics  Did not bring orthotic for adjustment, needed to tolerate  Plan: Continue per plan of care        Precautions: Diabetic  HEP: Blocking, hook fist, relative motion orthotic      Manuals             IASTM 10            PROM 8                                      Neuro Re-Ed             TGE 10            Eccentric FDS 2x10                                                                             Ther Ex                                                                                                                     Ther Activity                                       Gait Training                                       Modalities

## 2021-07-13 NOTE — ASSESSMENT & PLAN NOTE
Patient also reports early satiety, sensation of food sticking in the epigastric region with eating, followed by nausea and often times vomiting  She has very poorly controlled diabetes which is likely contributing to her symptoms, she may or may not have delayed gastric emptying, last gastric emptying study was normal but this was over 2 years ago at this point    Differential includes gastroparesis but also peptic ulcer disease, gastritis with or without H pylori infection, she is status post cholecystectomy     -continue omeprazole daily, she reports to be feeling little better over the last month so will continue her current regimen     -recommended patient to take her Pepcid in the evening every day rather than on an as needed basis     -advised patient regarding dietary lifestyle modification strategies for the mitigation of GERD symptoms, I also advised her about the paramount importance of follow-up with PCP and controlling her underlying diabetes     -will plan for EGD in addition to colonoscopy    -patient was advised regarding risks and benefits of the procedure, risks including but not limited to infection, perforation bleeding     -pending results of the EGD and patient's clinical course, can consider repeating gastric emptying study if she has persistent symptoms an EGD findings are unremarkable

## 2021-07-13 NOTE — PROGRESS NOTES
Follow-up Note -  [facility]  Gayle Mercado 1965 55 y o  female         Reason:  Follow-up; GERD, early satiety/vomiting, colorectal cancer screening    HPI:  61-year-old female with history of tobacco use, diabetes, anxiety/depression, fibromyalgia, who presents for follow-up; she was last seen by our service in summer 2019 with Dr Valerio Reyes for evaluation of epigastric pain, decreased appetite and weight loss; she had been recommended for gastric emptying scan to check for gastroparesis given her history of poorly controlled diabetes at the time  This was performed in August 2019 and was found to be normal     Her last EGD and colonoscopy appeared to have been done together in May 2019 at [facility], EGD showed only small hiatal hernia and colonoscopy was noted with poor prep and only showed some sigmoid diverticulosis  More recently she was seen in the ER in late May 2021 with acute diarrhea and recent worsening of otherwise chronic GERD symptoms, despite taking Protonix  Her diabetes still appears to be poorly controlled with A1c of 12 9 noted in April this year  Patient says that a couple of months ago, after she had been doing relatively well for a while, she started getting worse with her acid reflux again, with frequent heartburn episodes, also reporting sensation of food sitting in the epigastric region after eating, followed by nausea and vomiting/regurgitation  Patient said that she would drink Maalox frequently in addition to the acid reducers she takes  She says that for the last few weeks her symptoms have been a little better, she is taking omeprazole in the morning and, if needed, Pepcid in the evening  She denies any rectal bleeding or melena, any changes in her bowel habits  No known family history of colon cancer  She thinks her last colonoscopy prior to her poorly prepped one in May 2019 was a long time ago, is not sure of details    She said she had much difficulty tolerating the bowel prep for her last colonoscopy  REVIEW OF SYSTEMS:      CONSTITUTIONAL: Denies any fever, chills, or rigors  Good appetite, and no recent weight loss  HEENT: No earache or tinnitus  Denies hearing loss or visual disturbances  CARDIOVASCULAR: No chest pain or palpitations  RESPIRATORY: Denies any cough, hemoptysis, shortness of breath or dyspnea on exertion  GASTROINTESTINAL: As noted in the History of Present Illness  GENITOURINARY: No problems with urination  Denies any hematuria or dysuria  NEUROLOGIC: No dizziness or vertigo, denies headaches  MUSCULOSKELETAL: Denies any muscle or joint pain  SKIN: Denies skin rashes or itching  ENDOCRINE: Denies excessive thirst  Denies intolerance to heat or cold  PSYCHOSOCIAL: Denies depression or anxiety  Denies any recent memory loss  Past Medical History:   Diagnosis Date    Anxiety     Aortic aneurysm ([facility] [address]     Arthritis     Depression     Diabetes mellitus (HCC)     Fibromyalgia     GERD (gastroesophageal reflux disease)     GERD (gastroesophageal reflux disease)     H/O cardiovascular stress test 2018    no ischemia  EF 70%   H/O echocardiogram 2019    EF 60%  Mild LVH  trivial effusion      Hyperlipidemia     Hypertension     Migraines     Psychiatric disorder     anxiety      Past Surgical History:   Procedure Laterality Date    BACK SURGERY      Lumbar epidural steroid injection    CARPAL TUNNEL RELEASE Left      SECTION      CHOLECYSTECTOMY      COLONOSCOPY      incomplete    COLONOSCOPY      EYE SURGERY      HYSTERECTOMY      Total    OVARIAN CYST REMOVAL      TUBAL LIGATION      UPPER GASTROINTESTINAL ENDOSCOPY       Social History     Socioeconomic History    Marital status: Legally      Spouse name: Not on file    Number of children: Not on file    Years of education: Not on file    Highest education level: Not on file   Occupational History    Not on file Tobacco Use    Smoking status: Current Every Day Smoker     Packs/day: 1 00     Years: 30 00     Pack years: 30 00     Types: Cigarettes    Smokeless tobacco: Never Used   Vaping Use    Vaping Use: Never used   Substance and Sexual Activity    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   Drug use: Yes     Types: Marijuana     Comment: medical; seldom use    Sexual activity: Yes     Birth control/protection: Female Sterilization     Comment: Monogamous relationship with monogamous partner   Other Topics Concern    Not on file   Social History Narrative    Most recent tobacco use screenin2019    Do you currently or have you served in the [employer]: No    Were you activated, into active duty, as a member of the [employer] or as a Reservist: No    Advance directive: No    Chewing tobacco: none    Alcohol intake: None    Marital status: Single    Live alone or with others: with others    Family history of heart disease:    aortic aneurysm    High cholesterol: Yes    High blood pressure: Yes    Exercise level: Heavy    Overweight: Yes    Obese: Yes    Diabetes: Yes    General stress level: High    Diet: Regular    Caffeine intake: Occasional    Guns present in home: No    Seat belts used routinely: Yes    Sexual orientation: Heterosexual    Sunscreen used routinely: No    Seat belt/car seat used routinely: Yes    Exercise-How often do you exercise: as tolerated    Do you have regular medical check ups: Yes    Do you have regular dental check ups: Yes    Illicit drugs-years of use:    recovery 23 years - HX of     Social Determinants of Health     Financial Resource Strain:     Difficulty of Paying Living Expenses:    Food Insecurity:     Worried About Running Out of Food in the Last Year:     Ran Out of Food in the Last Year:    Transportation Needs:     Lack of Transportation (Medical):      Lack of Transportation (Non-Medical):    Physical Activity:     Days of Exercise per Week:     Minutes of Exercise per Session:    Stress:     Feeling of Stress :    Social Connections:     Frequency of Communication with Friends and Family:     Frequency of Social Gatherings with Friends and Family:     Attends [employer] Services:     Active Member of Clubs or Organizations:     Attends Club or Organization Meetings:     Marital Status:    Intimate Partner Violence:     Fear of Current or Ex-Partner:     Emotionally Abused:     Physically Abused:     Sexually Abused:      Family History   Problem Relation Age of Onset    Hypertension Mother     Arthritis Mother     Diabetes Father     Other Sister         renal cell carcinoma    Diabetes Other      Metformin and Clonidine  Current Outpatient Medications   Medication Sig Dispense Refill    ALPRAZolam (XANAX) 0 5 mg tablet "ONE-HALF" TABLET TWO TIMES A DAY 90 tablet 1    aluminum-magnesium hydroxide-simethicone (MAALOX) 200-200-20 MG/5ML SUSP Take 20 mL by mouth 4 (four) times a day (before meals and at bedtime) 355 mL 0    amLODIPine (NORVASC) 10 mg tablet Take 10 mg by mouth daily      atorvastatin (LIPITOR) 40 mg tablet Take 1 tablet (40 mg total) by mouth daily 90 tablet 2    [facility] IRON 240 (27 Fe) MG tablet TAKE 1 TABLET (240 MG TOTAL) BY MOUTH 3 (THREE) TIMES A DAY WITH MEALS 90 tablet 1    cyclobenzaprine (FLEXERIL) 10 mg tablet Take 1 tablet (10 mg total) by mouth 3 (three) times a day as needed for muscle spasms for up to 10 days 30 tablet 0    dexamethasone (DECADRON) 1 mg tablet Take 1 tablet (1 mg total) by mouth 2 (two) times a day with meals Take tablet between 10-11pm and then have lab next day in the morning 7-9am  1 tablet 0    diclofenac (VOLTAREN) 75 mg EC tablet Take 1 tablet (75 mg total) by mouth 2 (two) times a day 60 tablet 0    dicyclomine (BENTYL) 20 mg tablet Take 20 mg by mouth every 6 (six) hours      doxazosin (CARDURA) 8 MG tablet Take 1 tablet (8 mg total) by mouth daily 90 tablet 3    Dulaglutide (Trulicity) 3 VO/7 2VF SOPN Inject 0 5 mL (3 mg total) under the skin once a week 6 mL 1    ergocalciferol (VITAMIN D2) 50,000 units Take 1 capsule (50,000 Units total) by mouth 2 (two) times a week 24 capsule 1    famotidine (PEPCID) 20 mg tablet Take 1 tablet (20 mg total) by mouth 2 (two) times a day 30 tablet 0    fenofibrate (TRICOR) 145 mg tablet TAKE ONE TABLET EVERY DAY 90 tablet 6    fluticasone-umeclidinium-vilanterol (Trelegy Ellipta) 100-62 5-25 MCG/INH inhaler Inhale 1 puff daily Rinse mouth after use   60 each 1    hydrALAZINE (APRESOLINE) 50 mg tablet Take 2 tablets (100 mg total) by mouth 3 (three) times a day 200 tablet 3    hydrochlorothiazide (HYDRODIURIL) 25 mg tablet TAKE ONE TABLET EVERY DAY 90 tablet 1    ibuprofen (MOTRIN) 800 mg tablet Take 1 tablet (800 mg total) by mouth every 6 (six) hours as needed for moderate pain 30 tablet 0    Insulin Glargine (BASAGLAR KWIKPEN SC) Inject 90 Units under the skin      insulin lispro (HumaLOG) 100 units/mL injection Inject 20 Units under the skin 20 units without meal, 40 units with meal      Insulin Regular Human, Conc, (HumuLIN R U-500 KwikPen) 500 units/mL CONCENTRATED U-500 injection pen Inject 40 Units under the skin 3 (three) times a day before meals 3 pen 3    ketorolac (ACULAR) 0 5 % ophthalmic solution       lidocaine (LIDODERM) 5 % Apply 1 patch topically daily Remove & Discard patch within 12 hours or as directed by MD 30 patch 0    lisinopril (ZESTRIL) 40 mg tablet TAKE ONE TABLET TWO TIMES A  tablet 1    Magnesium 400 MG TABS Take by mouth      meclizine (ANTIVERT) 25 mg tablet Take 1 tablet (25 mg total) by mouth every 8 (eight) hours as needed for dizziness 30 tablet 0    metoprolol succinate (TOPROL-XL) 200 MG 24 hr tablet TAKE 1 TABLET EVERY DAY BY ORAL ROUTE 50 tablet 3    naproxen (NAPROSYN) 500 mg tablet Take 1 tablet (500 mg total) by mouth 2 (two) times a day with meals 30 tablet 0    omeprazole (PriLOSEC) 40 MG capsule Take 1 capsule (40 mg total) by mouth daily 90 capsule 1    ondansetron (ZOFRAN-ODT) 4 mg disintegrating tablet Take 1 tablet (4 mg total) by mouth every 6 (six) hours as needed for nausea or vomiting 20 tablet 0    pioglitazone (ACTOS) 30 mg tablet Take 1 tablet (30 mg total) by mouth daily 90 tablet 1    pregabalin (LYRICA) 75 mg capsule TAKE ONE CAPSULE EVERY DAY 90 capsule 1    simvastatin (ZOCOR) 10 mg tablet Take 10 mg by mouth daily at bedtime      spironolactone (ALDACTONE) 25 mg tablet Take 25 mg by mouth daily       zolpidem (AMBIEN) 10 mg tablet TAKE 1 TABLET (10 MG TOTAL) BY MOUTH DAILY AT BEDTIME AS NEEDED FOR SLEEP 90 tablet 0    bacitracin topical ointment 500 units/g topical ointment Apply to bilateral nares twice per day  (Patient not taking: Reported on 4/30/2021) 15 g 3    Blood Pressure Monitoring (Sphygmomanometer) MISC Use 2 (two) times a day (Patient not taking: Reported on 7/13/2021) 1 each 0    Insulin Pen Needle (UNIFINE PENTIPS PLUS) 31G X 6 MM MISC 1 Device by Does not apply route 4 (four) times a day (Patient not taking: Reported on 7/13/2021)      Lancets MISC 1 Device by Does not apply route 4 (four) times a day (Patient not taking: Reported on 7/13/2021)      Lidocaine Viscous HCl (XYLOCAINE) 2 % mucosal solution Swish and swallow 15 mL 4 (four) times a day as needed (abdominal or esophageal pain) (Patient not taking: Reported on 7/13/2021) 100 mL 0     No current facility-administered medications for this visit  Blood pressure 164/90, height 5' 9" (1 753 m), weight 92 1 kg (203 lb)      PHYSICAL EXAM:      General Appearance:   Alert, cooperative, no distress, appears stated age    HEENT:   Normocephalic, atraumatic, anicteric      Neck:  Supple, symmetrical, trachea midline, no adenopathy;    thyroid: no enlargement/tenderness/nodules; no carotid  bruit or JVD    Lungs:   Clear to auscultation bilaterally; no rales, rhonchi or wheezing; respirations unlabored    Heart[de-identified]   S1 and S2 normal; regular rate and rhythm; no murmur, rub, or gallop  Abdomen:   Soft, non-tender, non-distended; normal bowel sounds; no masses, no organomegaly    Extremities: No edema, erythema, wounds, rashes   Rectal:   Deferred                      Lab Results   Component Value Date    WBC 9 73 06/20/2021    HGB 13 5 06/20/2021    HCT 41 1 06/20/2021    MCV 84 06/20/2021     06/20/2021     Lab Results   Component Value Date    GLUCOSE 230 (H) 07/19/2015    CALCIUM 8 9 06/20/2021     07/19/2015    K 3 1 (L) 06/20/2021    CO2 31 06/20/2021     06/20/2021    BUN 11 06/20/2021    CREATININE 0 58 06/20/2021     Lab Results   Component Value Date    ALT 15 05/29/2021    AST 14 (L) 05/29/2021    ALKPHOS 88 2 05/29/2021    BILITOT 0 43 07/19/2015     Lab Results   Component Value Date    INR 0 95 04/18/2021    INR 0 95 10/07/2018    INR 1 00 05/13/2017    PROTIME 10 8 04/18/2021    PROTIME 12 4 10/07/2018    PROTIME 13 5 05/13/2017       CT facial bones with contrast    Result Date: 6/20/2021  Impression: Stable mild bilateral exophthalmos  Mild left periorbital preseptal soft tissue swelling may represent cellulitis  No postseptal inflammatory changes  The study was marked in [facility] for immediate notification  Workstation performed: [id number]       ASSESSMENT & PLAN:    Gastroesophageal reflux disease with esophagitis  Patient also reports early satiety, sensation of food sticking in the epigastric region with eating, followed by nausea and often times vomiting  She has very poorly controlled diabetes which is likely contributing to her symptoms, she may or may not have delayed gastric emptying, last gastric emptying study was normal but this was over 2 years ago at this point    Differential includes gastroparesis but also peptic ulcer disease, gastritis with or without H pylori infection, she is status post cholecystectomy     -continue omeprazole daily, she reports to be feeling little better over the last month so will continue her current regimen     -recommended patient to take her Pepcid in the evening every day rather than on an as needed basis     -advised patient regarding dietary lifestyle modification strategies for the mitigation of GERD symptoms, I also advised her about the paramount importance of follow-up with PCP and controlling her underlying diabetes     -will plan for EGD in addition to colonoscopy    -patient was advised regarding risks and benefits of the procedure, risks including but not limited to infection, perforation bleeding     -pending results of the EGD and patient's clinical course, can consider repeating gastric emptying study if she has persistent symptoms an EGD findings are unremarkable    Screening for colon cancer  Patient's last colonoscopy was noted with poor prep in 2019, and she thinks her last colonoscopy prior to that was a very long time ago    Cannot exclude adenomatous lesions at this time, rule out colonic adenomas or malignancy     -plan for colonoscopy at the same time as EGD; will plan for MiraLax bowel prep as patient reported she had difficulty tolerating the GoLYTELY prep with her last exam, leading her to vomit and be with poor visualization during exam    -patient was again advised regarding risks and benefits of the procedure, and was given instructions for the bowel prep

## 2021-07-13 NOTE — ASSESSMENT & PLAN NOTE
Patient's last colonoscopy was noted with poor prep in 2019, and she thinks her last colonoscopy prior to that was a very long time ago    Cannot exclude adenomatous lesions at this time, rule out colonic adenomas or malignancy     -plan for colonoscopy at the same time as EGD; will plan for MiraLax bowel prep as patient reported she had difficulty tolerating the GoLYTELY prep with her last exam, leading her to vomit and be with poor visualization during exam    -patient was again advised regarding risks and benefits of the procedure, and was given instructions for the bowel prep

## 2021-07-20 ENCOUNTER — OFFICE VISIT (OUTPATIENT)
Dept: OCCUPATIONAL THERAPY | Facility: CLINIC | Age: 56
End: 2021-07-20
Payer: MEDICARE

## 2021-07-20 DIAGNOSIS — M65.341 TRIGGER FINGER, RIGHT RING FINGER: Primary | ICD-10-CM

## 2021-07-20 DIAGNOSIS — M65.331 TRIGGER FINGER, RIGHT MIDDLE FINGER: ICD-10-CM

## 2021-07-20 PROCEDURE — 97112 NEUROMUSCULAR REEDUCATION: CPT

## 2021-07-20 PROCEDURE — 97760 ORTHOTIC MGMT&TRAING 1ST ENC: CPT

## 2021-07-20 PROCEDURE — 97140 MANUAL THERAPY 1/> REGIONS: CPT

## 2021-07-20 NOTE — PROGRESS NOTES
Daily Note     Today's date: 2021  Patient name: Eleazar Ro  : 1965  MRN: [id number]  Referring provider: Chelsea Elam PA-C  Dx:   Encounter Diagnosis     ICD-10-CM    1  Trigger finger, right ring finger  M65 341    2  Trigger finger, right middle finger  M65 331                   Subjective: "They don't feel like they're getting better  Still a lot of pain  My ring finger is the worst finger, it stays so tight "      Objective: See treatment diary below  Brings orthosis today for adjustment  Assessment: Patient continues to be limited by pain and stiffness limiting her function  She tolerates treatment fairly today  Patient will benefit from continued OPOT in order to address remaining deficits and maximize (I) in ADL and IADL  Plan: Continue per plan of care  Precautions: Diabetic  HEP: Blocking, hook fist, relative motion orthotic      Manuals            IASTM 10            PROM 8            Soft tissue mob  10                        Neuro Re-Ed             TGE 10 x10           Eccentric FDS 2x10 2x10           Isolated IPs  AROM 2x10                                                               Ther Ex                                                                                                                     Ther Activity             Orthotic fit and train  RME fabricated                                                               Modalities             MHP  5'                              Fabricated Orfit RME, patient reports significantly increased comfort and willingness to adhere to recommended wear schedule  Patient verbalized and demonstrated understanding of education provided on splint wear and care, wearing schedule, donning/doffing, monitoring of skin integrity and circulation, via successful teachback and demonstration  Patient instructed to contact office with questions or concerns

## 2021-07-27 ENCOUNTER — APPOINTMENT (OUTPATIENT)
Dept: OCCUPATIONAL THERAPY | Facility: CLINIC | Age: 56
End: 2021-07-27
Payer: MEDICARE

## 2021-07-27 ENCOUNTER — OFFICE VISIT (OUTPATIENT)
Dept: SLEEP CENTER | Facility: CLINIC | Age: 56
End: 2021-07-27
Payer: MEDICARE

## 2021-07-27 VITALS
SYSTOLIC BLOOD PRESSURE: 190 MMHG | BODY MASS INDEX: 29.86 KG/M2 | WEIGHT: 201.6 LBS | HEIGHT: 69 IN | DIASTOLIC BLOOD PRESSURE: 108 MMHG

## 2021-07-27 DIAGNOSIS — G47.33 OSA (OBSTRUCTIVE SLEEP APNEA): Primary | ICD-10-CM

## 2021-07-27 PROCEDURE — 99213 OFFICE O/P EST LOW 20 MIN: CPT | Performed by: INTERNAL MEDICINE

## 2021-07-27 RX ORDER — TRIPROLIDINE/PSEUDOEPHEDRINE 2.5MG-60MG
TABLET ORAL
COMMUNITY
Start: 2021-07-12 | End: 2021-11-23 | Stop reason: HOSPADM

## 2021-07-27 NOTE — PROGRESS NOTES
Progress Note - [facility] Center   Burgess Nowak :1965 MRN: [id number]      Reason for Visit:  55 y  o female here for PAP compliance check    Assessment:  Having difficulty with new PAP device  Sleep quality is unimproved  Compliance data show utilization for less than 70% of nights, for greater than or equal to 4 hours per night  Plan:  Since the patient has only mild CHANTALE and reports that she cannot tolerate APAP, I will discontinue PAP  Follow up:  PRN    History of Present Illness:  History of CHANTALE on PAP therapy  Difficulty with compliance  Review of Systems      Genitourinary none   Cardiology none   Gastrointestinal frequent heartburn/acid reflux   Neurology frequent headaches, need to move extremities and difficulty with memory   Constitutional none   Integumentary rash or dry skin   Psychiatry anxiety and depression   Musculoskeletal back pain and sciatica   Pulmonary shortness of breath with activity and difficulty breathing when lying flat    ENT throat clearing   Endocrine none   Hematological none           I have reviewed and updated the review of systems as necessary    Historical Information    Past Medical History:   Past Medical History:   Diagnosis Date    Anxiety     [facility] aneurysm [facility] )     Arthritis     Depression     Diabetes mellitus ([facility] )     Fibromyalgia     GERD (gastroesophageal reflux disease)     GERD (gastroesophageal reflux disease)     H/O cardiovascular stress test 2018    no ischemia  EF 70%   H/O echocardiogram 2019    EF 60%  Mild LVH  trivial effusion      Hyperlipidemia     Hypertension     Migraines     Psychiatric disorder     anxiety         Past Surgical History:   Past Surgical History:   Procedure Laterality Date    BACK SURGERY      Lumbar epidural steroid injection    CARPAL TUNNEL RELEASE Left      SECTION      CHOLECYSTECTOMY      COLONOSCOPY      incomplete    COLONOSCOPY      EYE SURGERY      HYSTERECTOMY Total    OVARIAN CYST REMOVAL      TUBAL LIGATION      UPPER GASTROINTESTINAL ENDOSCOPY           Social History:   Social History     Socioeconomic History    Marital status: Legally      Spouse name: None    Number of children: None    Years of education: None    Highest education level: None   Occupational History    None   Tobacco Use    Smoking status: Current Every Day Smoker     Packs/day: 1 00     Years: 30 00     Pack years: 30 00     Types: Cigarettes    Smokeless tobacco: Never Used   Vaping Use    Vaping Use: Never used   Substance and Sexual Activity    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       Drug use: Yes     Types: Marijuana     Comment: medical; seldom use    Sexual activity: Yes     Birth control/protection: Female Sterilization     Comment: Monogamous relationship with monogamous partner   Other Topics Concern    None   Social History Narrative    Most recent tobacco use screenin2019    Do you currently or have you served in the [employer]: No    Were you activated, into active duty, as a member of the [email or web address] or as a Reservist: No    Advance directive: No    Chewing tobacco: none    Alcohol intake: None    Marital status: Single    Live alone or with others: with others    Family history of heart disease:    aortic aneurysm    High cholesterol: Yes    High blood pressure: Yes    Exercise level: Heavy    Overweight: Yes    Obese: Yes    Diabetes: Yes    General stress level: High    Diet: Regular    Caffeine intake: Occasional    Guns present in home: No    Seat belts used routinely: Yes    Sexual orientation: Heterosexual    Sunscreen used routinely: No    Seat belt/car seat used routinely: Yes    Exercise-How often do you exercise: as tolerated    Do you have regular medical check ups: Yes    Do you have regular dental check ups: Yes    Illicit drugs-years of use:    recovery 23 years - HX of     Social Determinants of Health Financial Resource Strain:     Difficulty of Paying Living Expenses:    Food Insecurity:     Worried About Running Out of Food in the Last Year:     [address] in the Last Year:    Transportation Needs:     Lack of Transportation (Medical):      Lack of Transportation (Non-Medical):    Physical Activity:     Days of Exercise per Week:     Minutes of Exercise per Session:    Stress:     Feeling of Stress :    Social Connections:     Frequency of Communication with Friends and Family:     Frequency of Social Gatherings with Friends and Family:     Attends [employer] Services:     Active Member of Clubs or Organizations:     Attends Club or Organization Meetings:     Marital Status:    Intimate Partner Violence:     Fear of Current or Ex-Partner:     Emotionally Abused:     Physically Abused:     Sexually Abused:          Family History:   Family History   Problem Relation Age of Onset    Hypertension Mother     Arthritis Mother     Diabetes Father     Other Sister         renal cell carcinoma    Diabetes Other        Medications/Allergies:      Current Outpatient Medications:     ALPRAZolam (XANAX) 0 5 mg tablet, "ONE-HALF" TABLET TWO TIMES A DAY, Disp: 90 tablet, Rfl: 1    aluminum-magnesium hydroxide-simethicone (MAALOX) 200-200-20 MG/5ML SUSP, Take 20 mL by mouth 4 (four) times a day (before meals and at bedtime), Disp: 355 mL, Rfl: 0    amLODIPine (NORVASC) 10 mg tablet, Take 10 mg by mouth daily, Disp: , Rfl:     atorvastatin (LIPITOR) 40 mg tablet, Take 1 tablet (40 mg total) by mouth daily, Disp: 90 tablet, Rfl: 2    CV[employer] IRON 240 (27 Fe) MG tablet, TAKE 1 TABLET (240 MG TOTAL) BY MOUTH 3 (THREE) TIMES A DAY WITH MEALS, Disp: 90 tablet, Rfl: 1    cyclobenzaprine (FLEXERIL) 10 mg tablet, Take 1 tablet (10 mg total) by mouth 3 (three) times a day as needed for muscle spasms for up to 10 days (Patient taking differently: Take 10 mg by mouth as needed for muscle spasms ), Disp: 30 tablet, Rfl: 0    dexamethasone (DECADRON) 1 mg tablet, Take 1 tablet (1 mg total) by mouth 2 (two) times a day with meals Take tablet between 10-11pm and then have lab next day in the morning 7-9am , Disp: 1 tablet, Rfl: 0    diclofenac (VOLTAREN) 75 mg EC tablet, Take 1 tablet (75 mg total) by mouth 2 (two) times a day, Disp: 60 tablet, Rfl: 0    dicyclomine (BENTYL) 20 mg tablet, Take 20 mg by mouth every 6 (six) hours, Disp: , Rfl:     doxazosin (CARDURA) 8 MG tablet, Take 1 tablet (8 mg total) by mouth daily, Disp: 90 tablet, Rfl: 3    Dulaglutide (Trulicity) 3 CV/5 8QY SOPN, Inject 0 5 mL (3 mg total) under the skin once a week, Disp: 6 mL, Rfl: 1    Durezol 0 05 % EMUL, INSTILL 1 DROP INTO RIGHT EYE 4 TIMES A DAY, Disp: , Rfl:     ergocalciferol (VITAMIN D2) 50,000 units, Take 1 capsule (50,000 Units total) by mouth 2 (two) times a week, Disp: 24 capsule, Rfl: 1    famotidine (PEPCID) 20 mg tablet, Take 1 tablet (20 mg total) by mouth 2 (two) times a day, Disp: 30 tablet, Rfl: 0    fenofibrate (TRICOR) 145 mg tablet, TAKE ONE TABLET EVERY DAY, Disp: 90 tablet, Rfl: 6    fluticasone-umeclidinium-vilanterol (Trelegy Ellipta) 100-62 5-25 MCG/INH inhaler, Inhale 1 puff daily Rinse mouth after use , Disp: 60 each, Rfl: 1    hydrochlorothiazide (HYDRODIURIL) 25 mg tablet, TAKE ONE TABLET EVERY DAY, Disp: 90 tablet, Rfl: 1    ibuprofen (MOTRIN) 800 mg tablet, Take 1 tablet (800 mg total) by mouth every 6 (six) hours as needed for moderate pain, Disp: 30 tablet, Rfl: 0    Insulin Glargine (BASAGLAR KWIKPEN SC), Inject 90 Units under the skin, Disp: , Rfl:     insulin lispro (HumaLOG) 100 units/mL injection, Inject 20 Units under the skin 20 units without meal, 40 units with meal, Disp: , Rfl:     Insulin Regular Human, Conc, (HumuLIN R U-500 KwikPen) 500 units/mL CONCENTRATED U-500 injection pen, Inject 40 Units under the skin 3 (three) times a day before meals, Disp: 3 pen, Rfl: 3    ketorolac (ACULAR) 0 5 % ophthalmic solution, , Disp: , Rfl:     lisinopril (ZESTRIL) 40 mg tablet, TAKE ONE TABLET TWO TIMES A DAY, Disp: 180 tablet, Rfl: 1    Magnesium 400 MG TABS, Take by mouth, Disp: , Rfl:     meclizine (ANTIVERT) 25 mg tablet, Take 1 tablet (25 mg total) by mouth every 8 (eight) hours as needed for dizziness, Disp: 30 tablet, Rfl: 0    metoprolol succinate (TOPROL-XL) 200 MG 24 hr tablet, TAKE 1 TABLET EVERY DAY BY ORAL ROUTE, Disp: 50 tablet, Rfl: 3    naproxen (NAPROSYN) 500 mg tablet, Take 1 tablet (500 mg total) by mouth 2 (two) times a day with meals, Disp: 30 tablet, Rfl: 0    omeprazole (PriLOSEC) 40 MG capsule, Take 1 capsule (40 mg total) by mouth daily, Disp: 90 capsule, Rfl: 1    ondansetron (ZOFRAN-ODT) 4 mg disintegrating tablet, Take 1 tablet (4 mg total) by mouth every 6 (six) hours as needed for nausea or vomiting, Disp: 20 tablet, Rfl: 0    pioglitazone (ACTOS) 30 mg tablet, Take 1 tablet (30 mg total) by mouth daily, Disp: 90 tablet, Rfl: 1    pregabalin (LYRICA) 75 mg capsule, TAKE ONE CAPSULE EVERY DAY, Disp: 90 capsule, Rfl: 1    simvastatin (ZOCOR) 10 mg tablet, Take 10 mg by mouth daily at bedtime, Disp: , Rfl:     spironolactone (ALDACTONE) 25 mg tablet, Take 25 mg by mouth daily , Disp: , Rfl:     bacitracin topical ointment 500 units/g topical ointment, Apply to bilateral nares twice per day   (Patient not taking: Reported on 4/30/2021), Disp: 15 g, Rfl: 3    Blood Pressure Monitoring (Sphygmomanometer) MISC, Use 2 (two) times a day (Patient not taking: Reported on 7/13/2021), Disp: 1 each, Rfl: 0    hydrALAZINE (APRESOLINE) 50 mg tablet, Take 2 tablets (100 mg total) by mouth 3 (three) times a day, Disp: 200 tablet, Rfl: 3    Insulin Pen Needle (UNIFINE PENTIPS PLUS) 31G X 6 MM MISC, 1 Device by Does not apply route 4 (four) times a day (Patient not taking: Reported on 7/13/2021), Disp: , Rfl:     Lancets MISC, 1 Device by Does not apply route 4 (four) times a day (Patient not taking: Reported on 7/13/2021), Disp: , Rfl:     lidocaine (LIDODERM) 5 %, Apply 1 patch topically daily Remove & Discard patch within 12 hours or as directed by MD (Patient not taking: Reported on 7/27/2021), Disp: 30 patch, Rfl: 0    Lidocaine Viscous HCl (XYLOCAINE) 2 % mucosal solution, Swish and swallow 15 mL 4 (four) times a day as needed (abdominal or esophageal pain) (Patient not taking: Reported on 7/13/2021), Disp: 100 mL, Rfl: 0    zolpidem (AMBIEN) 10 mg tablet, TAKE 1 TABLET (10 MG TOTAL) BY MOUTH DAILY AT BEDTIME AS NEEDED FOR SLEEP (Patient not taking: Reported on 7/27/2021), Disp: 90 tablet, Rfl: 0      Objective    Vital Signs:   Vitals:    07/27/21 1448   BP: (!) 190/108     [place] Sleepiness Scale: Total score: 3        Physical Exam:    General: Alert, appropriate, cooperative, overweight    Head: NC/AT    Skin: Warm, dry    Neuro: No motor abnormalities, cranial nerves appear intact    Extremity: No clubbing, cyanosis    PAP setting:   APAP 4-20 cm  DME Provider:  YME  Test results:  AHI=11 9    Counseling / Coordination of Care  Total clinic time spent today 10 min  A description of the counseling / coordination of care: Discussed risks of discontinuing PAP  MINDA Sigala    Board Certified Sleep Specialist

## 2021-07-28 ENCOUNTER — TELEPHONE (OUTPATIENT)
Dept: SLEEP CENTER | Facility: CLINIC | Age: 56
End: 2021-07-28

## 2021-08-12 ENCOUNTER — TELEPHONE (OUTPATIENT)
Dept: ENDOCRINOLOGY | Facility: CLINIC | Age: 56
End: 2021-08-12

## 2021-08-12 ENCOUNTER — OFFICE VISIT (OUTPATIENT)
Dept: ENDOCRINOLOGY | Facility: CLINIC | Age: 56
End: 2021-08-12
Payer: MEDICARE

## 2021-08-12 VITALS
DIASTOLIC BLOOD PRESSURE: 110 MMHG | HEIGHT: 69 IN | BODY MASS INDEX: 29.64 KG/M2 | HEART RATE: 82 BPM | SYSTOLIC BLOOD PRESSURE: 178 MMHG | WEIGHT: 200.13 LBS

## 2021-08-12 DIAGNOSIS — IMO0002 UNCONTROLLED TYPE 2 DIABETES MELLITUS WITH COMPLICATION, WITH LONG-TERM CURRENT USE OF INSULIN: ICD-10-CM

## 2021-08-12 DIAGNOSIS — I10 UNCONTROLLED HYPERTENSION: ICD-10-CM

## 2021-08-12 DIAGNOSIS — E78.5 HYPERLIPIDEMIA ASSOCIATED WITH TYPE 2 DIABETES MELLITUS (HCC): Primary | ICD-10-CM

## 2021-08-12 DIAGNOSIS — E11.69 HYPERLIPIDEMIA ASSOCIATED WITH TYPE 2 DIABETES MELLITUS (HCC): Primary | ICD-10-CM

## 2021-08-12 LAB — SL AMB POCT HEMOGLOBIN AIC: 11.1 (ref ?–6.5)

## 2021-08-12 PROCEDURE — 83036 HEMOGLOBIN GLYCOSYLATED A1C: CPT | Performed by: INTERNAL MEDICINE

## 2021-08-12 PROCEDURE — 99214 OFFICE O/P EST MOD 30 MIN: CPT | Performed by: INTERNAL MEDICINE

## 2021-08-12 NOTE — PROGRESS NOTES
Nyla Mccullough 55 y o  female MRN: [id number]    Encounter: [id number]      Assessment/Plan     Assessment: This is a 55 y o  female with diabetes mellitus type 2, HTN, HLP  Plan:    Uncontrolled type 2 diabetes mellitus with complication, with long-term current use of insulin ([facility]) - Most recent A1c 11 1%, instructed pt to go to the pharmacy to  her U500 Regular insulin, take 40 units before meals and 20 units if she does not eat  - will reach out the office staff to start dexcom authorization    - continue trulicity 3 mg weekly, actos, not interested in SGLT2 inhibitor therapy    -     Microalbumin / creatinine urine ratio; Future  -     Comprehensive metabolic panel Lab Collect; Future     Uncontrolled hypertension -  Secondary workup for primary hyperaldosteronism, Cushing syndrome   - pt instructed to have labs done between 7-9am  - complete lab work that was ordered in 5/2021  -     Aldosterone/Renin Ratio; Future  -     Cortisol Level, AM Specimen Lab Collect; Future     Hyperlipidemia associated with type 2 diabetes mellitus ([facility] ) -  continue current regimen, managed per primary care physician     Follow up in 3 months  CC: uncontrolled diabetes mellitus type 2      History of Present Illness     HPI:  This is a 51 y  o  female with a history of type 2 diabetes with long-term use of insulin since 10 years ago  She reports complications of retinopathy, nephropathy (microalbuminuria) and neuropathy   She denies history of stroke or MI  Denies recent illness or hospitalizations  She does not measure blood sugars        ROS: has polyuria, polydipsia, denies chest pain, shortness of breath, numbness or tingling in her hands or feet     Current regimen: apidra 10-15 units before meals, basaglar 90 units at bedtime, trulicity 3 0 mg weekly, actos 30 mg daily  She was prescribed U500 regular insulin at previous visit, but never went to pharmacy to  the medication     She supposed to take U 500 regular insulin - 40 units before meals 3x daily if she does not eat take 20 units  She does not tolerate metformin  Patient with questionable medication compliance     Ophthalmology- 2021  Podiatry-       Hypertension-  Lisinopril 40 mg daily, metoprolol succinate 200 mg daily, hydrochlorothiazide 25 mg daily, amlodipine 10 mg daily, doxazosin 4 mg daily, clonidine patch, spironolactone 25 mg daily, hydralazine 100 mg BID  Hyperlipidemia- lipitor, fenofibrate   Thyroid disorders - no       Review of Systems   Constitutional: Negative for chills, fatigue and fever  HENT: Negative for congestion, postnasal drip and sore throat  Eyes: Negative for pain  Respiratory: Negative for cough and shortness of breath  Cardiovascular: Negative for chest pain, palpitations and leg swelling  Gastrointestinal: Negative for abdominal pain, blood in stool, constipation, diarrhea and nausea  Endocrine: Negative for polydipsia and polyuria  Genitourinary: Negative for dysuria  Musculoskeletal: Negative for arthralgias and back pain  Neurological: Negative for dizziness, light-headedness and headaches  Psychiatric/Behavioral: Negative for behavioral problems  The patient is not nervous/anxious  All other systems reviewed and are negative  Historical Information   Past Medical History:   Diagnosis Date    Anxiety     A[facility] ([facility]     Arthritis     Depression     Diabetes mellitus (HCC)     Fibromyalgia     GERD (gastroesophageal reflux disease)     GERD (gastroesophageal reflux disease)     H/O cardiovascular stress test 2018    no ischemia  EF 70%   H/O echocardiogram 2019    EF 60%  Mild LVH  trivial effusion      Hyperlipidemia     Hypertension     Migraines     Psychiatric disorder     anxiety     Past Surgical History:   Procedure Laterality Date    BACK SURGERY      Lumbar epidural steroid injection    CARPAL TUNNEL RELEASE Left      SECTION      CHOLECYSTECTOMY      COLONOSCOPY      incomplete    COLONOSCOPY      EYE SURGERY      HYSTERECTOMY      Total    OVARIAN CYST REMOVAL      TUBAL LIGATION      UPPER GASTROINTESTINAL ENDOSCOPY       Social History   Social History     Substance and Sexual Activity   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        Social History     Substance and Sexual Activity   Drug Use Yes    Types: Marijuana    Comment: medical; seldom use     Social History     Tobacco Use   Smoking Status Current Every Day Smoker    Packs/day: 1 00    Years: 30 00    Pack years: 30 00    Types: Cigarettes   Smokeless Tobacco Never Used     Family History:   Family History   Problem Relation Age of Onset    Hypertension Mother     Arthritis Mother     Diabetes Father     Other Sister         renal cell carcinoma    Diabetes Other        Meds/Allergies   Current Outpatient Medications   Medication Sig Dispense Refill    ALPRAZolam (XANAX) 0 5 mg tablet "ONE-HALF" TABLET TWO TIMES A DAY 90 tablet 1    aluminum-magnesium hydroxide-simethicone (MAALOX) 200-200-20 MG/5ML SUSP Take 20 mL by mouth 4 (four) times a day (before meals and at bedtime) 355 mL 0    amLODIPine (NORVASC) 10 mg tablet Take 10 mg by mouth daily      atorvastatin (LIPITOR) 40 mg tablet Take 1 tablet (40 mg total) by mouth daily 90 tablet 2    CVS IRON 240 (27 Fe) MG tablet TAKE 1 TABLET (240 MG TOTAL) BY MOUTH 3 (THREE) TIMES A DAY WITH MEALS 90 tablet 1    diclofenac (VOLTAREN) 75 mg EC tablet Take 1 tablet (75 mg total) by mouth 2 (two) times a day 60 tablet 0    dicyclomine (BENTYL) 20 mg tablet Take 20 mg by mouth every 6 (six) hours      doxazosin (CARDURA) 8 MG tablet Take 1 tablet (8 mg total) by mouth daily 90 tablet 3    Dulaglutide (Trulicity) 3 FJ/6 4AU SOPN Inject 0 5 mL (3 mg total) under the skin once a week 6 mL 1    Durezol 0 05 % EMUL INSTILL 1 DROP INTO RIGHT EYE 4 TIMES A DAY      ergocalciferol (VITAMIN D2) 50,000 units Take 1 capsule (50,000 Units total) by mouth 2 (two) times a week 24 capsule 1    famotidine (PEPCID) 20 mg tablet Take 1 tablet (20 mg total) by mouth 2 (two) times a day 30 tablet 0    fenofibrate (TRICOR) 145 mg tablet TAKE ONE TABLET EVERY DAY 90 tablet 6    fluticasone-umeclidinium-vilanterol (Trelegy Ellipta) 100-62 5-25 MCG/INH inhaler Inhale 1 puff daily Rinse mouth after use   60 each 1    hydrALAZINE (APRESOLINE) 50 mg tablet Take 2 tablets (100 mg total) by mouth 3 (three) times a day 200 tablet 3    hydrochlorothiazide (HYDRODIURIL) 25 mg tablet TAKE ONE TABLET EVERY DAY 90 tablet 1    ibuprofen (MOTRIN) 800 mg tablet Take 1 tablet (800 mg total) by mouth every 6 (six) hours as needed for moderate pain 30 tablet 0    Insulin Glargine (BASAGLAR KWIKPEN SC) Inject 90 Units under the skin      insulin lispro (HumaLOG) 100 units/mL injection Inject 20 Units under the skin 20 units without meal, 40 units with meal      ketorolac (ACULAR) 0 5 % ophthalmic solution       lisinopril (ZESTRIL) 40 mg tablet TAKE ONE TABLET TWO TIMES A  tablet 1    Magnesium 400 MG TABS Take by mouth      meclizine (ANTIVERT) 25 mg tablet Take 1 tablet (25 mg total) by mouth every 8 (eight) hours as needed for dizziness 30 tablet 0    metoprolol succinate (TOPROL-XL) 200 MG 24 hr tablet TAKE 1 TABLET EVERY DAY BY ORAL ROUTE 50 tablet 3    naproxen (NAPROSYN) 500 mg tablet Take 1 tablet (500 mg total) by mouth 2 (two) times a day with meals 30 tablet 0    omeprazole (PriLOSEC) 40 MG capsule Take 1 capsule (40 mg total) by mouth daily 90 capsule 1    ondansetron (ZOFRAN-ODT) 4 mg disintegrating tablet Take 1 tablet (4 mg total) by mouth every 6 (six) hours as needed for nausea or vomiting 20 tablet 0    pioglitazone (ACTOS) 30 mg tablet Take 1 tablet (30 mg total) by mouth daily 90 tablet 1    pregabalin (LYRICA) 75 mg capsule TAKE ONE CAPSULE EVERY DAY 90 capsule 1    simvastatin (ZOCOR) 10 mg tablet Take 10 mg by mouth daily at bedtime      spironolactone (ALDACTONE) 25 mg tablet Take 25 mg by mouth daily       zolpidem (AMBIEN) 10 mg tablet TAKE 1 TABLET (10 MG TOTAL) BY MOUTH DAILY AT BEDTIME AS NEEDED FOR SLEEP 90 tablet 0    bacitracin topical ointment 500 units/g topical ointment Apply to bilateral nares twice per day  (Patient not taking: Reported on 4/30/2021) 15 g 3    Blood Pressure Monitoring (Sphygmomanometer) MISC Use 2 (two) times a day (Patient not taking: Reported on 7/13/2021) 1 each 0    cyclobenzaprine (FLEXERIL) 10 mg tablet Take 1 tablet (10 mg total) by mouth 3 (three) times a day as needed for muscle spasms for up to 10 days (Patient not taking: Reported on 8/12/2021) 30 tablet 0    dexamethasone (DECADRON) 1 mg tablet Take 1 tablet (1 mg total) by mouth 2 (two) times a day with meals Take tablet between 10-11pm and then have lab next day in the morning 7-9am  (Patient not taking: Reported on 8/12/2021) 1 tablet 0    Insulin Pen Needle (UNIFINE PENTIPS PLUS) 31G X 6 MM MISC 1 Device by Does not apply route 4 (four) times a day (Patient not taking: Reported on 7/13/2021)      Insulin Regular Human, Conc, (HumuLIN R U-500 KwikPen) 500 units/mL CONCENTRATED U-500 injection pen Inject 40 Units under the skin 3 (three) times a day before meals (Patient not taking: Reported on 8/12/2021) 3 pen 3    Lancets MISC 1 Device by Does not apply route 4 (four) times a day (Patient not taking: Reported on 7/13/2021)      lidocaine (LIDODERM) 5 % Apply 1 patch topically daily Remove & Discard patch within 12 hours or as directed by MD (Patient not taking: Reported on 7/27/2021) 30 patch 0    Lidocaine Viscous HCl (XYLOCAINE) 2 % mucosal solution Swish and swallow 15 mL 4 (four) times a day as needed (abdominal or esophageal pain) (Patient not taking: Reported on 7/13/2021) 100 mL 0     No current facility-administered medications for this visit       Allergies   Allergen Reactions    Metformin Diarrhea    Clonidine Rash     Patch        Objective   Vitals: Blood pressure (!) 178/110, pulse 82, height 5' 9" (1 753 m), weight 90 8 kg (200 lb 2 oz)  Physical Exam  Constitutional:       Appearance: She is well-developed  HENT:      Head: Normocephalic and atraumatic  Eyes:      General: No scleral icterus  Right eye: No discharge  Left eye: No discharge  Conjunctiva/sclera: Conjunctivae normal       Pupils: Pupils are equal, round, and reactive to light  Neck:      Thyroid: No thyromegaly  Cardiovascular:      Rate and Rhythm: Normal rate and regular rhythm  Heart sounds: No murmur heard  Pulmonary:      Effort: Pulmonary effort is normal  No respiratory distress  Breath sounds: Normal breath sounds  Abdominal:      General: There is no distension  Palpations: Abdomen is soft  Tenderness: There is no abdominal tenderness  Musculoskeletal:      Cervical back: Neck supple  Skin:     General: Skin is warm and dry  Neurological:      Mental Status: She is alert  Psychiatric:         Mood and Affect: Mood normal          The history was obtained from the review of the chart, patient  Lab Results:        Imaging Studies:       I have personally reviewed pertinent reports  Portions of the record may have been created with voice recognition software  Occasional wrong word or "sound a like" substitutions may have occurred due to the inherent limitations of voice recognition software  Read the chart carefully and recognize, using context, where substitutions have occurred

## 2021-08-26 ENCOUNTER — APPOINTMENT (EMERGENCY)
Dept: RADIOLOGY | Facility: HOSPITAL | Age: 56
End: 2021-08-26
Payer: MEDICARE

## 2021-08-26 ENCOUNTER — NURSE TRIAGE (OUTPATIENT)
Dept: OTHER | Facility: OTHER | Age: 56
End: 2021-08-26

## 2021-08-26 ENCOUNTER — HOSPITAL ENCOUNTER (EMERGENCY)
Facility: HOSPITAL | Age: 56
Discharge: HOME | End: 2021-08-26
Payer: MEDICARE

## 2021-08-26 VITALS
OXYGEN SATURATION: 97 % | RESPIRATION RATE: 17 BRPM | TEMPERATURE: 97.7 F | DIASTOLIC BLOOD PRESSURE: 83 MMHG | SYSTOLIC BLOOD PRESSURE: 210 MMHG | HEART RATE: 71 BPM

## 2021-08-26 DIAGNOSIS — L03.119 CELLULITIS OF LOWER EXTREMITY, UNSPECIFIED LATERALITY: ICD-10-CM

## 2021-08-26 DIAGNOSIS — R73.9 HYPERGLYCEMIA: ICD-10-CM

## 2021-08-26 DIAGNOSIS — I10 HYPERTENSION, UNSPECIFIED TYPE: Primary | ICD-10-CM

## 2021-08-26 LAB
ALBUMIN SERPL BCP-MCNC: 3.3 G/DL (ref 3.4–4.8)
ALP SERPL-CCNC: 70.5 U/L (ref 35–140)
ALT SERPL W P-5'-P-CCNC: 16 U/L (ref 5–54)
ANION GAP SERPL CALCULATED.3IONS-SCNC: 7 MMOL/L (ref 4–13)
AST SERPL W P-5'-P-CCNC: 14 U/L (ref 15–41)
BACTERIA UR QL AUTO: ABNORMAL /HPF
BASOPHILS # BLD AUTO: 0.03 THOUSANDS/ΜL (ref 0–0.1)
BASOPHILS NFR BLD AUTO: 0 % (ref 0–1)
BILIRUB SERPL-MCNC: 0.41 MG/DL (ref 0.3–1.2)
BILIRUB UR QL STRIP: NEGATIVE
BNP SERPL-MCNC: 82.9 PG/ML (ref 1–100)
BUN SERPL-MCNC: 13 MG/DL (ref 6–20)
CALCIUM ALBUM COR SERPL-MCNC: 9.1 MG/DL (ref 8.3–10.1)
CALCIUM SERPL-MCNC: 8.5 MG/DL (ref 8.4–10.2)
CHLORIDE SERPL-SCNC: 106 MMOL/L (ref 96–108)
CLARITY UR: CLEAR
CO2 SERPL-SCNC: 29 MMOL/L (ref 22–33)
COLOR UR: YELLOW
CREAT SERPL-MCNC: 0.61 MG/DL (ref 0.4–1.1)
EOSINOPHIL # BLD AUTO: 0.06 THOUSAND/ΜL (ref 0–0.61)
EOSINOPHIL NFR BLD AUTO: 1 % (ref 0–6)
ERYTHROCYTE [DISTWIDTH] IN BLOOD BY AUTOMATED COUNT: 14.7 % (ref 11.6–15.1)
GFR SERPL CREATININE-BSD FRML MDRD: 118 ML/MIN/1.73SQ M
GLUCOSE SERPL-MCNC: 232 MG/DL (ref 65–140)
GLUCOSE SERPL-MCNC: 245 MG/DL (ref 65–140)
GLUCOSE UR STRIP-MCNC: ABNORMAL MG/DL
HCT VFR BLD AUTO: 39 % (ref 34.8–46.1)
HGB BLD-MCNC: 12.9 G/DL (ref 11.5–15.4)
HGB UR QL STRIP.AUTO: ABNORMAL
IMM GRANULOCYTES # BLD AUTO: 0.01 THOUSAND/UL (ref 0–0.2)
IMM GRANULOCYTES NFR BLD AUTO: 0 % (ref 0–2)
KETONES UR STRIP-MCNC: NEGATIVE MG/DL
LEUKOCYTE ESTERASE UR QL STRIP: NEGATIVE
LYMPHOCYTES # BLD AUTO: 1.97 THOUSANDS/ΜL (ref 0.6–4.47)
LYMPHOCYTES NFR BLD AUTO: 20 % (ref 14–44)
MCH RBC QN AUTO: 28.2 PG (ref 26.8–34.3)
MCHC RBC AUTO-ENTMCNC: 33.1 G/DL (ref 31.4–37.4)
MCV RBC AUTO: 85 FL (ref 82–98)
MONOCYTES # BLD AUTO: 0.65 THOUSAND/ΜL (ref 0.17–1.22)
MONOCYTES NFR BLD AUTO: 7 % (ref 4–12)
NEUTROPHILS # BLD AUTO: 7.09 THOUSANDS/ΜL (ref 1.85–7.62)
NEUTS SEG NFR BLD AUTO: 72 % (ref 43–75)
NITRITE UR QL STRIP: NEGATIVE
NON-SQ EPI CELLS URNS QL MICRO: ABNORMAL /HPF
PH UR STRIP.AUTO: 6 [PH]
PLATELET # BLD AUTO: 244 THOUSANDS/UL (ref 149–390)
PMV BLD AUTO: 10.5 FL (ref 8.9–12.7)
POTASSIUM SERPL-SCNC: 3 MMOL/L (ref 3.5–5)
PROT SERPL-MCNC: 6.3 G/DL (ref 6.4–8.3)
PROT UR STRIP-MCNC: ABNORMAL MG/DL
RBC # BLD AUTO: 4.57 MILLION/UL (ref 3.81–5.12)
RBC #/AREA URNS AUTO: ABNORMAL /HPF
SODIUM SERPL-SCNC: 142 MMOL/L (ref 133–145)
SP GR UR STRIP.AUTO: 1.01 (ref 1–1.03)
TROPONIN I SERPL-MCNC: <0.03 NG/ML (ref 0–0.07)
UROBILINOGEN UR QL STRIP.AUTO: 0.2 E.U./DL
WBC # BLD AUTO: 9.81 THOUSAND/UL (ref 4.31–10.16)
WBC #/AREA URNS AUTO: ABNORMAL /HPF

## 2021-08-26 PROCEDURE — 82948 REAGENT STRIP/BLOOD GLUCOSE: CPT

## 2021-08-26 PROCEDURE — 99285 EMERGENCY DEPT VISIT HI MDM: CPT

## 2021-08-26 PROCEDURE — 36415 COLL VENOUS BLD VENIPUNCTURE: CPT

## 2021-08-26 PROCEDURE — 83880 ASSAY OF NATRIURETIC PEPTIDE: CPT

## 2021-08-26 PROCEDURE — 80053 COMPREHEN METABOLIC PANEL: CPT

## 2021-08-26 PROCEDURE — 96361 HYDRATE IV INFUSION ADD-ON: CPT

## 2021-08-26 PROCEDURE — 96376 TX/PRO/DX INJ SAME DRUG ADON: CPT

## 2021-08-26 PROCEDURE — 96374 THER/PROPH/DIAG INJ IV PUSH: CPT

## 2021-08-26 PROCEDURE — 84484 ASSAY OF TROPONIN QUANT: CPT

## 2021-08-26 PROCEDURE — 71045 X-RAY EXAM CHEST 1 VIEW: CPT

## 2021-08-26 PROCEDURE — 81001 URINALYSIS AUTO W/SCOPE: CPT

## 2021-08-26 PROCEDURE — 85025 COMPLETE CBC W/AUTO DIFF WBC: CPT

## 2021-08-26 RX ORDER — LABETALOL 20 MG/4 ML (5 MG/ML) INTRAVENOUS SYRINGE
10 ONCE
Status: COMPLETED | OUTPATIENT
Start: 2021-08-26 | End: 2021-08-26

## 2021-08-26 RX ORDER — SODIUM CHLORIDE 9 MG/ML
125 INJECTION, SOLUTION INTRAVENOUS CONTINUOUS
Status: DISCONTINUED | OUTPATIENT
Start: 2021-08-26 | End: 2021-08-26 | Stop reason: HOSPADM

## 2021-08-26 RX ADMIN — SODIUM CHLORIDE 125 ML/HR: 0.9 INJECTION, SOLUTION INTRAVENOUS at 08:11

## 2021-08-26 RX ADMIN — LABETALOL 20 MG/4 ML (5 MG/ML) INTRAVENOUS SYRINGE 10 MG: at 08:11

## 2021-08-26 RX ADMIN — LABETALOL 20 MG/4 ML (5 MG/ML) INTRAVENOUS SYRINGE 10 MG: at 09:39

## 2021-08-26 NOTE — ED PROVIDER NOTES
History  Chief Complaint   Patient presents with    Hypoglycemia - Symptomatic     pt stated her blood sugar at 0500 was 51, as she felt dizzy, nauseous and lightheaded  pt drank coffee and blood sugar rechecked for 28      60-year-old female history multiple medical problems including insulin controlled diabetes hypertension thoracic aortic aneurysmal dilation,  GERD presents secondary to failing generally weak this morning  Patient states when she woke up she felt very weak she took her blood sugar and it was only at 51  Patient states she drank some coffee with cream and sugar and her blood sugar went up to 300  Secondary to ongoing feeling weak she came to the emergency department  Patient denies any vomiting however feels nauseous  Patient denies any chest pain or shortness of breath patient denies any localizing abdominal pain patient denies any headache or focal neurological symptoms  Prior to Admission Medications   Prescriptions Last Dose Informant Patient Reported? Taking?    ALPRAZolam (XANAX) 0 5 mg tablet More than a month at Unknown time Self No No   Sig: "ONE-HALF" TABLET TWO TIMES A DAY   Blood Pressure Monitoring (Sphygmomanometer) MISC Not Taking at Unknown time Self No No   Sig: Use 2 (two) times a day   Patient not taking: Reported on 7/13/2021   [facility] IRON 240 (27 Fe) MG tablet 8/25/2021 at Unknown time Self No Yes   Sig: TAKE 1 TABLET (240 MG TOTAL) BY MOUTH 3 (THREE) TIMES A DAY WITH MEALS   Dulaglutide (Trulicity) 3 FA/7 3XV SOPN Past Week at Unknown time Self No Yes   Sig: Inject 0 5 mL (3 mg total) under the skin once a week   Durezol 0 05 % EMUL Not Taking at Unknown time Self Yes No   Sig: INSTILL 1 DROP INTO RIGHT EYE 4 TIMES A DAY   Patient not taking: Reported on 8/26/2021   Insulin Glargine (BASAGLAR KWIKPEN SC) Not Taking at Unknown time Self Yes No   Sig: Inject 90 Units under the skin   Patient not taking: Reported on 8/26/2021   Insulin Pen Needle (Marguerite Vu PLUS) 31G X 6 MM MISC Not Taking at Unknown time Self Yes No   Si Device by Does not apply route 4 (four) times a day   Patient not taking: Reported on 2021   Insulin Regular Human, Conc, (HumuLIN R U-500 KwikPen) 500 units/mL CONCENTRATED U-500 injection pen Not Taking at Unknown time Self No No   Sig: Inject 40 Units under the skin 3 (three) times a day before meals   Patient not taking: Reported on 2021   Lancets MISC Not Taking at Unknown time Self Yes No   Si Device by Does not apply route 4 (four) times a day   Patient not taking: Reported on 2021   Lidocaine Viscous HCl (XYLOCAINE) 2 % mucosal solution Not Taking at Unknown time Self No No   Sig: Swish and swallow 15 mL 4 (four) times a day as needed (abdominal or esophageal pain)   Patient not taking: Reported on 2021   Magnesium 400 MG TABS 2021 at Unknown time Self Yes Yes   Sig: Take by mouth   aluminum-magnesium hydroxide-simethicone (MAALOX) 200-200-20 MG/5ML SUSP  Self No No   Sig: Take 20 mL by mouth 4 (four) times a day (before meals and at bedtime)   amLODIPine (NORVASC) 10 mg tablet 2021 at Unknown time Self Yes Yes   Sig: Take 10 mg by mouth daily   atorvastatin (LIPITOR) 40 mg tablet 2021 at Unknown time Self No Yes   Sig: Take 1 tablet (40 mg total) by mouth daily   bacitracin topical ointment 500 units/g topical ointment Not Taking at Unknown time Self No No   Sig: Apply to bilateral nares twice per day     Patient not taking: Reported on 2021   cyclobenzaprine (FLEXERIL) 10 mg tablet Past Week at Unknown time Self No Yes   Sig: Take 1 tablet (10 mg total) by mouth 3 (three) times a day as needed for muscle spasms for up to 10 days   dexamethasone (DECADRON) 1 mg tablet 2021 at Unknown time Self No Yes   Sig: Take 1 tablet (1 mg total) by mouth 2 (two) times a day with meals Take tablet between 10-11pm and then have lab next day in the morning 7-9am    diclofenac (VOLTAREN) 75 mg EC tablet Past Week at Unknown time Self No Yes   Sig: Take 1 tablet (75 mg total) by mouth 2 (two) times a day   dicyclomine (BENTYL) 20 mg tablet 8/25/2021 at Unknown time Self Yes Yes   Sig: Take 20 mg by mouth every 6 (six) hours   doxazosin (CARDURA) 8 MG tablet 8/25/2021 at Unknown time Self No Yes   Sig: Take 1 tablet (8 mg total) by mouth daily   ergocalciferol (VITAMIN D2) 50,000 units Not Taking at Unknown time Self No No   Sig: Take 1 capsule (50,000 Units total) by mouth 2 (two) times a week   Patient not taking: Reported on 8/26/2021   famotidine (PEPCID) 20 mg tablet 8/25/2021 at Unknown time Self No Yes   Sig: Take 1 tablet (20 mg total) by mouth 2 (two) times a day   fenofibrate (TRICOR) 145 mg tablet 8/25/2021 at Unknown time Self No Yes   Sig: TAKE ONE TABLET EVERY DAY   fluticasone-umeclidinium-vilanterol (Trelegy Ellipta) 100-62 5-25 MCG/INH inhaler 8/25/2021 at Unknown time Self No Yes   Sig: Inhale 1 puff daily Rinse mouth after use    hydrALAZINE (APRESOLINE) 50 mg tablet 8/25/2021 at Unknown time Self No Yes   Sig: Take 2 tablets (100 mg total) by mouth 3 (three) times a day   hydrochlorothiazide (HYDRODIURIL) 25 mg tablet 8/25/2021 at Unknown time Self No Yes   Sig: TAKE ONE TABLET EVERY DAY   ibuprofen (MOTRIN) 800 mg tablet More than a month at Unknown time Self No No   Sig: Take 1 tablet (800 mg total) by mouth every 6 (six) hours as needed for moderate pain   insulin lispro (HumaLOG) 100 units/mL injection 8/25/2021 at Unknown time Self Yes Yes   Sig: Inject 20 Units under the skin 20 units without meal, 40 units with meal   ketorolac (ACULAR) 0 5 % ophthalmic solution Not Taking at Unknown time Self Yes No   Patient not taking: Reported on 8/26/2021   lidocaine (LIDODERM) 5 % Not Taking at Unknown time Self No No   Sig: Apply 1 patch topically daily Remove & Discard patch within 12 hours or as directed by MD   Patient not taking: Reported on 7/27/2021   lisinopril (ZESTRIL) 40 mg tablet 8/25/2021 at Unknown time Self No Yes   Sig: TAKE ONE TABLET TWO TIMES A DAY   meclizine (ANTIVERT) 25 mg tablet More than a month at Unknown time Self No No   Sig: Take 1 tablet (25 mg total) by mouth every 8 (eight) hours as needed for dizziness   metoprolol succinate (TOPROL-XL) 200 MG 24 hr tablet 8/25/2021 at Unknown time Self No Yes   Sig: TAKE 1 TABLET EVERY DAY BY ORAL ROUTE   naproxen (NAPROSYN) 500 mg tablet 8/25/2021 at Unknown time Self No Yes   Sig: Take 1 tablet (500 mg total) by mouth 2 (two) times a day with meals   omeprazole (PriLOSEC) 40 MG capsule 8/25/2021 at Unknown time Self No Yes   Sig: Take 1 capsule (40 mg total) by mouth daily   ondansetron (ZOFRAN-ODT) 4 mg disintegrating tablet Not Taking at Unknown time Self No No   Sig: Take 1 tablet (4 mg total) by mouth every 6 (six) hours as needed for nausea or vomiting   Patient not taking: Reported on 8/26/2021   pioglitazone (ACTOS) 30 mg tablet 8/25/2021 at Unknown time Self No Yes   Sig: Take 1 tablet (30 mg total) by mouth daily   pregabalin (LYRICA) 75 mg capsule 8/25/2021 at Unknown time Self No Yes   Sig: TAKE ONE CAPSULE EVERY DAY   simvastatin (ZOCOR) 10 mg tablet 8/25/2021 at Unknown time Self Yes Yes   Sig: Take 10 mg by mouth daily at bedtime   spironolactone (ALDACTONE) 25 mg tablet 8/25/2021 at Unknown time Self Yes Yes   Sig: Take 25 mg by mouth daily    zolpidem (AMBIEN) 10 mg tablet Past Month at Unknown time Self No Yes   Sig: TAKE 1 TABLET (10 MG TOTAL) BY MOUTH DAILY AT BEDTIME AS NEEDED FOR SLEEP      Facility-Administered Medications: None       Past Medical History:   Diagnosis Date    Anxiety     Aortic aneurysm (HCC)     Arthritis     Depression     Diabetes mellitus (HCC)     Fibromyalgia     GERD (gastroesophageal reflux disease)     GERD (gastroesophageal reflux disease)     H/O cardiovascular stress test 09/2018    no ischemia  EF 70%   H/O echocardiogram 01/2019    EF 60%  Mild LVH  trivial effusion      Hyperlipidemia     Hypertension     Migraines     Psychiatric disorder     anxiety       Past Surgical History:   Procedure Laterality Date    BACK SURGERY      Lumbar epidural steroid injection    CARPAL TUNNEL RELEASE Left      SECTION      CHOLECYSTECTOMY      COLONOSCOPY      incomplete    COLONOSCOPY      EYE SURGERY      HYSTERECTOMY      Total    OVARIAN CYST REMOVAL      TUBAL LIGATION      UPPER GASTROINTESTINAL ENDOSCOPY         Family History   Problem Relation Age of Onset    Hypertension Mother    Ary Adams Arthritis Mother     Diabetes Father     Other Sister         renal cell carcinoma    Diabetes Other      I have reviewed and agree with the history as documented  E-Cigarette/Vaping    E-Cigarette Use Never User      E-Cigarette/Vaping Substances    Nicotine No     THC No     CBD No     Flavoring No     Other No     Unknown No      Social History     Tobacco Use    Smoking status: Current Every Day Smoker     Packs/day: 1 00     Years: 30 00     Pack years: 30 00     Types: Cigarettes    Smokeless tobacco: Never Used   Vaping Use    Vaping Use: Never used   Substance Use Topics    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   Drug use: Yes     Types: Marijuana     Comment: medical; seldom use       Review of Systems   Constitutional: Negative for chills and fever  HENT: Negative for congestion  Eyes: Negative for visual disturbance  Respiratory: Negative for shortness of breath  Cardiovascular: Negative for chest pain  Gastrointestinal: Negative for abdominal pain  Endocrine: Negative for cold intolerance  Genitourinary: Negative for frequency  Musculoskeletal: Negative for gait problem  Skin: Negative for rash  Neurological: Positive for weakness  Negative for dizziness  Psychiatric/Behavioral: Negative for behavioral problems and confusion  Physical Exam  Physical Exam  Vitals and nursing note reviewed  Constitutional:       Appearance: She is well-developed  HENT:      Head: Normocephalic and atraumatic  Right Ear: Tympanic membrane normal       Left Ear: Tympanic membrane normal       Nose: Nose normal       Mouth/Throat:      Mouth: Mucous membranes are moist    Eyes:      Conjunctiva/sclera: Conjunctivae normal       Pupils: Pupils are equal, round, and reactive to light  Cardiovascular:      Rate and Rhythm: Normal rate and regular rhythm  Heart sounds: Normal heart sounds  Pulmonary:      Effort: Pulmonary effort is normal       Breath sounds: Normal breath sounds  Abdominal:      General: Bowel sounds are normal       Palpations: Abdomen is soft  Musculoskeletal:         General: Normal range of motion  Cervical back: Normal range of motion and neck supple  Skin:     General: Skin is warm and dry  Capillary Refill: Capillary refill takes less than 2 seconds  Neurological:      Mental Status: She is alert and oriented to person, place, and time     Psychiatric:         Behavior: Behavior normal          Vital Signs  ED Triage Vitals [08/26/21 0740]   Temperature Pulse Respirations Blood Pressure SpO2   97 7 °F (36 5 °C) 77 16 (!) 193/111 100 %      Temp Source Heart Rate Source Patient Position - Orthostatic VS BP Location FiO2 (%)   Oral Monitor Sitting Right arm --      Pain Score       --           Vitals:    08/26/21 0808 08/26/21 0845 08/26/21 0933 08/26/21 0955   BP: (!) 229/96 (!) 205/92 (!) 216/95 (!) 210/83   Pulse: 73  67 71   Patient Position - Orthostatic VS:    Sitting         Visual Acuity      ED Medications  Medications   sodium chloride 0 9 % infusion (0 mL/hr Intravenous Stopped 8/26/21 0955)   Labetalol HCl (NORMODYNE) injection 10 mg (10 mg Intravenous Given 8/26/21 0811)   Labetalol HCl (NORMODYNE) injection 10 mg (10 mg Intravenous Given 8/26/21 3034)       Diagnostic Studies  Results Reviewed     Procedure Component Value Units Date/Time    Urine Microscopic [[id number]]  (Abnormal) Collected: 08/26/21 0825    Lab Status: Final result Specimen: Urine, Clean Catch Updated: 08/26/21 0900     RBC, UA 0-1 /hpf      WBC, UA 0-1 /hpf      Epithelial Cells Moderate /hpf      Bacteria, UA None Seen /hpf     B-Type Natriuretic Peptide(BNP) CA, GH, EA Camp[facility] Only [[id number]]  (Normal) Collected: 08/26/21 0802    Lab Status: Final result Specimen: Blood from Arm, Right Updated: 08/26/21 0843     BNP 82 9 pg/mL     UA w Reflex to Microscopic w Reflex to Culture [[id number]]  (Abnormal) Collected: 08/26/21 0825    Lab Status: Final result Specimen: Urine, Clean Catch Updated: 08/26/21 0837     Color, UA Yellow     Clarity, UA Clear     Specific Gravity, UA 1 010     pH, UA 6 0     Leukocytes, UA Negative     Nitrite, UA Negative     Protein, UA 2+ mg/dl      Glucose, UA 1+ mg/dl      Ketones, UA Negative mg/dl      Urobilinogen, UA 0 2 E U /dl      Bilirubin, UA Negative     Blood, UA Trace-Intact    Troponin I [[id number]]  (Normal) Collected: 08/26/21 0802    Lab Status: Final result Specimen: Blood from Arm, Right Updated: 08/26/21 0830     Troponin I <0 03 ng/mL     Comprehensive metabolic panel [[id number]]  (Abnormal) Collected: 08/26/21 0802    Lab Status: Final result Specimen: Blood from Arm, Right Updated: 08/26/21 9975     Sodium 142 mmol/L      Potassium 3 0 mmol/L      Chloride 106 mmol/L      CO2 29 mmol/L      ANION GAP 7 mmol/L      BUN 13 mg/dL      Creatinine 0 61 mg/dL      Glucose 232 mg/dL      Calcium 8 5 mg/dL      Corrected Calcium 9 1 mg/dL      AST 14 U/L      ALT 16 U/L      Alkaline Phosphatase 70 5 U/L      Total Protein 6 3 g/dL      Albumin 3 3 g/dL      Total Bilirubin 0 41 mg/dL      eGFR 118 ml/min/1 73sq m     Narrative:      [place] guidelines for Chronic Kidney Disease (CKD):     Stage 1 with normal or high GFR (GFR > 90 mL/min/1 73 square meters)    Stage 2 Mild CKD (GFR = 60-89 mL/min/1 73 square meters)    Stage 3A Moderate CKD (GFR = 45-59 mL/min/1 73 square meters)    Stage 3B Moderate CKD (GFR = 30-44 mL/min/1 73 square meters)    Stage 4 Severe CKD (GFR = 15-29 mL/min/1 73 square meters)    Stage 5 End Stage CKD (GFR <15 mL/min/1 73 square meters)  Note: GFR calculation is accurate only with a steady state creatinine    CBC and differential [[id number]]  (Normal) Collected: 08/26/21 0802    Lab Status: Final result Specimen: Blood from Arm, Right Updated: 08/26/21 0811     WBC 9 81 Thousand/uL      RBC 4 57 Million/uL      Hemoglobin 12 9 g/dL      Hematocrit 39 0 %      MCV 85 fL      MCH 28 2 pg      MCHC 33 1 g/dL      RDW 14 7 %      MPV 10 5 fL      Platelets 731 Thousands/uL      Neutrophils Relative 72 %      Immat GRANS % 0 %      Lymphocytes Relative 20 %      Monocytes Relative 7 %      Eosinophils Relative 1 %      Basophils Relative 0 %      Neutrophils Absolute 7 09 Thousands/µL      Immature Grans Absolute 0 01 Thousand/uL      Lymphocytes Absolute 1 97 Thousands/µL      Monocytes Absolute 0 65 Thousand/µL      Eosinophils Absolute 0 06 Thousand/µL      Basophils Absolute 0 03 Thousands/µL     Fingerstick Glucose (POCT) [[id number]]  (Abnormal) Collected: 08/26/21 0739    Lab Status: Final result Updated: 08/26/21 0741     POC Glucose 245 mg/dl                  XR chest 1 view portable   ED Interpretation by Katherine Jesus MD (08/26 7857)   No acute findings       Final Result by Louis Agarwal MD (08/26 1065)      No acute cardiopulmonary disease  Workstation performed: [id number]                    Procedures  Procedures         ED Course                             SBIRT 20yo+      Most Recent Value   SBIRT (24 yo +)   In order to provide better care to our patients, we are screening all of our patients for alcohol and drug use  Would it be okay to ask you these screening questions?   Yes Filed at: 08/26/2021 8280   Initial Alcohol Screen: US AUDIT-C    1  How often do you have a drink containing alcohol?  0 Filed at: 08/26/2021 0805   2  How many drinks containing alcohol do you have on a typical day you are drinking? 0 Filed at: 08/26/2021 0805   3b  FEMALE Any Age, or MALE 65+: How often do you have 4 or more drinks on one occassion? 0 Filed at: 08/26/2021 0805   Audit-C Score  0 Filed at: 08/26/2021 4904   PAVEL: How many times in the past year have you    Used an illegal drug or used a prescription medication for non-medical reasons? Never Filed at: 08/26/2021 0805                    MDM  Number of Diagnoses or Management Options  Diagnosis management comments: Patient was monitored in the emergency department labs demonstrated no significant findings potassium is slightly decreased to 3 point CBC within normal limits blood glucose at 232  Patient is given labetalol 10 mg IV for elevated blood pressure blood pressure initially did come down patient was very anxious however states she has not taken any of her blood pressure medicines she states she does not want stay in the hospital she says she feels fine and she is back to normal and she would prefer to go home she social take her medications  Explained to patient blood pressure systolic was elevated more than I would like to discharge her but she states she will take that responsibility she says it is always on the high side when checked at home his blood pressure level does not bother her she is fully aware of the risks of leaving with the elevated blood pressure and states she just knows she needs to take her blood pressure medicine at home  Patient denies any associated headache at this point in time no nausea no vomiting she states she feels like she is back to her normal self  Plan will be to discharge home diagnosis is hyperglycemia and hypertension recommend follow-up with primary care physician as soon as possible    Patient struck to return to the emergency department for severe worsening symptoms      Disposition  Final diagnoses:   Hypertension, unspecified type   Hyperglycemia   Cellulitis of lower extremity, unspecified laterality     Time reflects when diagnosis was documented in both MDM as applicable and the Disposition within this note     Time User Action Codes Description Comment    8/26/2021  9:50 AM Yael Digna Add [I10] Hypertension, unspecified type     8/26/2021  9:50 AM Yael Digna Add [R73 9] Hyperglycemia     8/26/2021 10:00 AM Yael Digna Add [L03 119] Cellulitis of lower extremity, unspecified laterality       ED Disposition     ED Disposition Condition Date/Time Comment    Discharge Stable u Aug 26, 2021  9:49 AM Elizabeth Monge discharge to home/self care  Follow-up Information     Follow up With Specialties Details Why Contact Info    Salvatore David MD [facility] Medicine Schedule an appointment as soon as possible for a visit   [address] [place] [address] [facility] [place]  [phone]            Patient's Medications   Discharge Prescriptions    MUPIROCIN (BACTROBAN) 2 % OINTMENT    Apply topically 3 (three) times a day       Start Date: 8/26/2021 End Date: --       Order Dose: --       Quantity: 15 g    Refills: 0     No discharge procedures on file      PDMP Review       Value Time User    PDMP Reviewed  Yes 3/8/2021  9:59 PM Madelyn Barreto DO          ED Provider  Electronically Signed by           Priscilla Duncan MD  08/26/21 2073       Priscilla Duncan MD  08/26/21 101 Nita Lezama MD  08/26/21 1003

## 2021-08-26 NOTE — TELEPHONE ENCOUNTER
Reason for Disposition   [1] Low blood sugar symptoms persist > 30 minutes AND [2] using low blood sugar Care Advice    Answer Assessment - Initial Assessment Questions  1  SYMPTOMS: "What symptoms are you concerned about?"      Sugar 51, feel shaky  2  ONSET:  "When did the symptoms start?"      This morning  3  BLOOD GLUCOSE: "What is your blood glucose level?"       51  4  USUAL RANGE: "What is your blood glucose level usually?" (e g , usual fasting morning value, usual evening value)      Typically over 300  5  TYPE 1 or 2:  "Do you know what type of diabetes you have?"  (e g , Type 1, Type 2, Gestational; doesn't know)       Type 2  6  INSULIN: "Do you take insulin?" "What type of insulin(s) do you use? What is the mode of delivery? (syringe, pen; injection or pump) "When did you last give yourself an insulin dose?" (i e , time or hours/minutes ago) "How much did you give?" (i e , how many units)      HUmulin, last dose last night 40units  7  DIABETES PILLS: "Do you take any pills for your diabetes?"      Actos  8  OTHER SYMPTOMS: "Do you have any symptoms?" (e g , fever, frequent urination, difficulty breathing, vomiting)      Dizziness  9  LOW BLOOD GLUCOSE TREATMENT: "What have you done so far to treat the low blood glucose level?"      Cup of coffee  10  FOOD: "When did you last eat or drink?"        Yesterday  11   ALONE: "Are you alone right now or is someone with you?"         Yes    Protocols used: DIABETES - LOW BLOOD SUGAR[facility]

## 2021-09-02 ENCOUNTER — OFFICE VISIT (OUTPATIENT)
Dept: FAMILY MEDICINE CLINIC | Facility: CLINIC | Age: 56
End: 2021-09-02
Payer: MEDICARE

## 2021-09-02 VITALS
SYSTOLIC BLOOD PRESSURE: 130 MMHG | OXYGEN SATURATION: 97 % | HEIGHT: 69 IN | WEIGHT: 204 LBS | HEART RATE: 80 BPM | BODY MASS INDEX: 30.21 KG/M2 | TEMPERATURE: 97.6 F | DIASTOLIC BLOOD PRESSURE: 80 MMHG

## 2021-09-02 DIAGNOSIS — E16.2 HYPOGLYCEMIA: Primary | ICD-10-CM

## 2021-09-02 DIAGNOSIS — Z79.4 TYPE 2 DIABETES MELLITUS WITH HYPERGLYCEMIA, WITH LONG-TERM CURRENT USE OF INSULIN (HCC): ICD-10-CM

## 2021-09-02 DIAGNOSIS — R42 VERTIGO: ICD-10-CM

## 2021-09-02 DIAGNOSIS — Z79.899 POLYPHARMACY: ICD-10-CM

## 2021-09-02 DIAGNOSIS — E11.65 TYPE 2 DIABETES MELLITUS WITH HYPERGLYCEMIA, WITH LONG-TERM CURRENT USE OF INSULIN (HCC): ICD-10-CM

## 2021-09-02 PROCEDURE — 99213 OFFICE O/P EST LOW 20 MIN: CPT | Performed by: FAMILY MEDICINE

## 2021-09-02 RX ORDER — MECLIZINE HYDROCHLORIDE 25 MG/1
25 TABLET ORAL EVERY 8 HOURS PRN
Qty: 30 TABLET | Refills: 0 | Status: SHIPPED | OUTPATIENT
Start: 2021-09-02 | End: 2021-11-23 | Stop reason: HOSPADM

## 2021-09-02 NOTE — ASSESSMENT & PLAN NOTE
Continues to suffer from vertigo  Has been on meclizine 25mg q8 PRN for about 2-3 years, however she does not take it often      - Will refill Meclizine 25mg q8 PRN, #30  - Recommend sitting down if her experiences dizziness  - Check blood sugars when experiencing dizziness to ensure she is not hypoglycemic    RTC for Med Rec 9/7/2021

## 2021-09-02 NOTE — PROGRESS NOTES
Family Medicine Follow-Up Office Visit  Eleazar Ro 55 y o  female   MRN: [id number] : 1965  ENCOUNTER: 2021 4:07 PM    Assessment and Plan   Hypoglycemia  Recent ED visit for blood sugars in the 50s and 80s with lightheadedness and weakness  States she took her insulin that day but did not eat  Seen by endocrinology  and some changes were made to her insulin regimen  Placed Dexcom G6 Continuous blood sugar monitoring device 2021  Reports sugars have been in the 160s  - Continue current insulin regimen:   - Basaglar 80 units HS   - Humulin 500, 40 units with meals or 20 units with skipped  Meals   - Trulicity weekly  - Continue Oral medications (actos 30mg daily)  - Continue to monitor blood sugars  - Strict ED precautions for hypoglycemic/hyperglycemic episodes    Polypharmacy  Patient states she requires medication refills on >20 medications  Unsure exactly which medications she needs or takes  Requires full medication reconciliation     - Patient scheduled  for Med Rec    Vertigo  Continues to suffer from vertigo  Has been on meclizine 25mg q8 PRN for about 2-3 years, however she does not take it often  - Will refill Meclizine 25mg q8 PRN, #30  - Recommend sitting down if her experiences dizziness  - Check blood sugars when experiencing dizziness to ensure she is not hypoglycemic    RTC for Med Rec 2021      Chief Complaint     Chief Complaint   Patient presents with    Follow-up       History of Present Illness   Eleazar Ro is a 55y o -year-old female who presents today for an ED follow up after experiencing hypoglycemia  States she felt light headed and had a blood sugar in the 50s, then 80s  She improved in the ED and was discharged home  Recently placed a continuous blood sugar monitoring device and has had sugars in the 160s  She currently feels dizzy which she attributes to her Vertigo   She requires medication refills, however she is unable to recall what medications she is due for  Review of Systems   Review of Systems   Constitutional: Positive for fatigue  Negative for appetite change and fever  HENT: Negative for congestion, rhinorrhea, sneezing and sore throat  Respiratory: Negative for cough, chest tightness, shortness of breath and wheezing  Cardiovascular: Negative for chest pain and palpitations  Gastrointestinal: Negative for abdominal distention, abdominal pain, constipation, diarrhea, nausea and vomiting  Musculoskeletal: Negative for arthralgias, back pain, joint swelling and myalgias  Skin: Negative for rash  Neurological: Positive for dizziness  Negative for weakness, numbness and headaches  Hematological: Negative for adenopathy  Psychiatric/Behavioral: Negative for confusion  All other systems reviewed and are negative        Active Problem List     Patient Active Problem List   Diagnosis    Anxiety    Cardiac murmur    Carpal tunnel syndrome of left wrist    Change in bowel habit    Chronic pain disorder    Cough    Depression, recurrent ([facility] [address] )    [facility] to [facility]    Diabetic peripheral neuropathy (HCC)    Dizziness    Uncontrolled hypertension    Fibromyalgia    Gastroesophageal reflux disease with esophagitis    Hemangioma of bone    Hyperlipidemia associated with type 2 diabetes mellitus (HCC)    Hypertension    Hypoestrogenism    Low back pain    Lumbar radiculopathy    Medically complex patient    Neuropathy    Non compliance with medical treatment    Noncompliance with diet and medication regimen    Overweight    Primary insomnia    Right-sided thoracic back pain    Sciatica of left side    Screening for colon cancer    Shingles    Thoracic radiculitis    Tobacco abuse    Uncontrolled type 2 diabetes mellitus with complication, with long-term current use of insulin (HCC)    Vertebral body hemangioma    Vertigo    Vaginal discharge    Yeast vaginitis  Recurrent vaginitis    Abnormal urinalysis    Thoracic aortic aneurysm without rupture (HCC)    Epigastric pain    Urinary tract infection with hematuria    Bacterial vaginosis    Abscess    Palpitations    Nasal vestibulitis    Orthopnea    CHANTALE (obstructive sleep apnea)    Diarrhea    Trigger finger, right middle finger    Trigger finger, right ring finger    Hypoglycemia    Polypharmacy       Past Medical History, Past Surgical History, Family History, and Social History were reviewed and updated today as appropriate  Objective   /80   Pulse 80   Temp 97 6 °F (36 4 °C)   Ht 5' 9" (1 753 m)   Wt 92 5 kg (204 lb)   SpO2 97%   BMI 30 13 kg/m²     Physical Exam  Constitutional:       General: She is not in acute distress  Appearance: She is well-developed  She is not diaphoretic  HENT:      Head: Normocephalic and atraumatic  Eyes:      General: No scleral icterus  Right eye: No discharge  Left eye: No discharge  Conjunctiva/sclera: Conjunctivae normal    Cardiovascular:      Rate and Rhythm: Normal rate and regular rhythm  Heart sounds: Normal heart sounds  No murmur heard  Pulmonary:      Effort: Pulmonary effort is normal  No respiratory distress  Breath sounds: Normal breath sounds  No wheezing  Abdominal:      General: There is no distension  Palpations: Abdomen is soft  Tenderness: There is no abdominal tenderness  Comments: CBSM device in place   Musculoskeletal:         General: No tenderness  Normal range of motion  Lymphadenopathy:      Cervical: No cervical adenopathy  Skin:     General: Skin is warm and dry  Coloration: Skin is not pale  Findings: No erythema  Neurological:      Mental Status: She is alert     Psychiatric:         Behavior: Behavior normal            Pertinent Laboratory/Diagnostic Studies:  Lab Results   Component Value Date    GLUCOSE 230 (H) 07/19/2015    BUN 13 08/26/2021 CREATININE 0 61 08/26/2021    CALCIUM 8 5 08/26/2021     07/19/2015    K 3 0 (L) 08/26/2021    CO2 29 08/26/2021     08/26/2021     Lab Results   Component Value Date    ALT 16 08/26/2021    AST 14 (L) 08/26/2021    ALKPHOS 70 5 08/26/2021    BILITOT 0 43 07/19/2015       Lab Results   Component Value Date    WBC 9 81 08/26/2021    HGB 12 9 08/26/2021    HCT 39 0 08/26/2021    MCV 85 08/26/2021     08/26/2021       No results found for: TSH    No results found for: CHOL  Lab Results   Component Value Date    TRIG 133 7 04/02/2021     Lab Results   Component Value Date    HDL 45 (L) 04/02/2021     Lab Results   Component Value Date    LDLCALC 44 04/02/2021     Lab Results   Component Value Date    HGBA1C 11 1 (A) 08/12/2021       Results for orders placed or performed during the hospital encounter of 08/26/21   CBC and differential   Result Value Ref Range    WBC 9 81 4 31 - 10 16 Thousand/uL    RBC 4 57 3 81 - 5 12 Million/uL    Hemoglobin 12 9 11 5 - 15 4 g/dL    Hematocrit 39 0 34 8 - 46 1 %    MCV 85 82 - 98 fL    MCH 28 2 26 8 - 34 3 pg    MCHC 33 1 31 4 - 37 4 g/dL    RDW 14 7 11 6 - 15 1 %    MPV 10 5 8 9 - 12 7 fL    Platelets 500 716 - 901 Thousands/uL    Neutrophils Relative 72 43 - 75 %    Immat GRANS % 0 0 - 2 %    Lymphocytes Relative 20 14 - 44 %    Monocytes Relative 7 4 - 12 %    Eosinophils Relative 1 0 - 6 %    Basophils Relative 0 0 - 1 %    Neutrophils Absolute 7 09 1 85 - 7 62 Thousands/µL    Immature Grans Absolute 0 01 0 00 - 0 20 Thousand/uL    Lymphocytes Absolute 1 97 0 60 - 4 47 Thousands/µL    Monocytes Absolute 0 65 0 17 - 1 22 Thousand/µL    Eosinophils Absolute 0 06 0 00 - 0 61 Thousand/µL    Basophils Absolute 0 03 0 00 - 0 10 Thousands/µL   Comprehensive metabolic panel   Result Value Ref Range    Sodium 142 133 - 145 mmol/L    Potassium 3 0 (L) 3 5 - 5 0 mmol/L    Chloride 106 96 - 108 mmol/L    CO2 29 22 - 33 mmol/L    ANION GAP 7 4 - 13 mmol/L    BUN 13 6 - 20 mg/dL    Creatinine 0 61 0 40 - 1 10 mg/dL    Glucose 232 (H) 65 - 140 mg/dL    Calcium 8 5 8 4 - 10 2 mg/dL    Corrected Calcium 9 1 8 3 - 10 1 mg/dL    AST 14 (L) 15 - 41 U/L    ALT 16 5 - 54 U/L    Alkaline Phosphatase 70 5 35 - 140 U/L    Total Protein 6 3 (L) 6 4 - 8 3 g/dL    Albumin 3 3 (L) 3 4 - 4 8 g/dL    Total Bilirubin 0 41 0 30 - 1 20 mg/dL    eGFR 118 ml/min/1 73sq m   Troponin I   Result Value Ref Range    Troponin I <0 03 0 00 - 0 07 ng/mL   B-Type Natriuretic Peptide(BNP) CA, GH, EA Campuses Only   Result Value Ref Range    BNP 82 9 1 - 100 pg/mL   UA w Reflex to Microscopic w Reflex to Culture    Specimen: Urine, Clean Catch   Result Value Ref Range    Color, UA Yellow Yellow    Clarity, UA Clear Clear    Specific [place], UA 1 010 1 001 - 1 030    pH, UA 6 0 5 0, 5 5, 6 0, 6 5, 7 0, 7 5, 8 0    Leukocytes, UA Negative Negative    Nitrite, UA Negative Negative    Protein, UA 2+ (A) Negative, Interference- unable to analyze mg/dl    Glucose, UA 1+ (A) Negative mg/dl    Ketones, UA Negative Negative mg/dl    Urobilinogen, UA 0 2 0 2, 1 0 E U /dl E U /dl    Bilirubin, UA Negative Negative    Blood, UA Trace-Intact (A) Negative   Urine Microscopic   Result Value Ref Range    RBC, UA 0-1 None Seen, 0-1, 1-2, 2-4, 0-5 /hpf    WBC, UA 0-1 None Seen, 0-1, 1-2, 0-5, 2-4 /hpf    Epithelial Cells Moderate (A) None Seen, Occasional /hpf    Bacteria, UA None Seen None Seen, Occasional /hpf   Fingerstick Glucose (POCT)   Result Value Ref Range    POC Glucose 245 (H) 65 - 140 mg/dl       No orders of the defined types were placed in this encounter          Current Medications     Current Outpatient Medications   Medication Sig Dispense Refill    Insulin Pen Needle (UNIFINE PENTIPS PLUS) 31G X 6 MM MISC 1 Device by Does not apply route 4 (four) times a day      Insulin Regular Human, Conc, (HumuLIN R U-500 KwikPen) 500 units/mL CONCENTRATED U-500 injection pen Inject 40 Units under the skin 3 (three) times a day before meals 3 pen 3    ALPRAZolam (XANAX) 0 5 mg tablet "ONE-HALF" TABLET TWO TIMES A DAY 90 tablet 1    aluminum-magnesium hydroxide-simethicone (MAALOX) 481-359-06 MG/5ML SUSP Take 20 mL by mouth 4 (four) times a day (before meals and at bedtime) 355 mL 0    amLODIPine (NORVASC) 10 mg tablet Take 10 mg by mouth daily      atorvastatin (LIPITOR) 40 mg tablet Take 1 tablet (40 mg total) by mouth daily 90 tablet 2    bacitracin topical ointment 500 units/g topical ointment Apply to bilateral nares twice per day  (Patient not taking: Reported on 4/30/2021) 15 g 3    Blood Pressure Monitoring (Sphygmomanometer) MISC Use 2 (two) times a day (Patient not taking: Reported on 7/13/2021) 1 each 0    [employer] IRON 240 (27 Fe) MG tablet TAKE 1 TABLET (240 MG TOTAL) BY MOUTH 3 (THREE) TIMES A DAY WITH MEALS 90 tablet 1    cyclobenzaprine (FLEXERIL) 10 mg tablet Take 1 tablet (10 mg total) by mouth 3 (three) times a day as needed for muscle spasms for up to 10 days 30 tablet 0    dexamethasone (DECADRON) 1 mg tablet Take 1 tablet (1 mg total) by mouth 2 (two) times a day with meals Take tablet between 10-11pm and then have lab next day in the morning 7-9am  1 tablet 0    dicyclomine (BENTYL) 20 mg tablet Take 20 mg by mouth every 6 (six) hours      doxazosin (CARDURA) 8 MG tablet Take 1 tablet (8 mg total) by mouth daily 90 tablet 3    Dulaglutide (Trulicity) 3 IM/1 0KO SOPN Inject 0 5 mL (3 mg total) under the skin once a week 6 mL 1    Durezol 0 05 % EMUL INSTILL 1 DROP INTO RIGHT EYE 4 TIMES A DAY (Patient not taking: Reported on 8/26/2021)      ergocalciferol (VITAMIN D2) 50,000 units Take 1 capsule (50,000 Units total) by mouth 2 (two) times a week (Patient not taking: Reported on 8/26/2021) 24 capsule 1    fenofibrate (TRICOR) 145 mg tablet TAKE ONE TABLET EVERY DAY 90 tablet 6    fluticasone-umeclidinium-vilanterol (Trelegy Ellipta) 100-62 5-25 MCG/INH inhaler Inhale 1 puff daily Rinse mouth after use   61 each 1    hydrALAZINE (APRESOLINE) 50 mg tablet Take 2 tablets (100 mg total) by mouth 3 (three) times a day 200 tablet 3    hydrochlorothiazide (HYDRODIURIL) 25 mg tablet TAKE ONE TABLET EVERY DAY 90 tablet 1    ibuprofen (MOTRIN) 800 mg tablet Take 1 tablet (800 mg total) by mouth every 6 (six) hours as needed for moderate pain 30 tablet 0    Insulin Glargine (BASAGLAR KWIKPEN SC) Inject 90 Units under the skin (Patient not taking: Reported on 8/26/2021)      ketorolac (ACULAR) 0 5 % ophthalmic solution  (Patient not taking: Reported on 8/26/2021)      Lancets MISC 1 Device by Does not apply route 4 (four) times a day (Patient not taking: Reported on 7/13/2021)      lidocaine (LIDODERM) 5 % Apply 1 patch topically daily Remove & Discard patch within 12 hours or as directed by MD (Patient not taking: Reported on 7/27/2021) 30 patch 0    Lidocaine Viscous HCl (XYLOCAINE) 2 % mucosal solution Swish and swallow 15 mL 4 (four) times a day as needed (abdominal or esophageal pain) (Patient not taking: Reported on 7/13/2021) 100 mL 0    lisinopril (ZESTRIL) 40 mg tablet TAKE ONE TABLET TWO TIMES A  tablet 1    Magnesium 400 MG TABS Take by mouth      meclizine (ANTIVERT) 25 mg tablet Take 1 tablet (25 mg total) by mouth every 8 (eight) hours as needed for dizziness 30 tablet 0    metoprolol succinate (TOPROL-XL) 200 MG 24 hr tablet TAKE 1 TABLET EVERY DAY BY ORAL ROUTE 50 tablet 3    mupirocin (BACTROBAN) 2 % ointment Apply topically 3 (three) times a day 15 g 0    naproxen (NAPROSYN) 500 mg tablet Take 1 tablet (500 mg total) by mouth 2 (two) times a day with meals 30 tablet 0    omeprazole (PriLOSEC) 40 MG capsule Take 1 capsule (40 mg total) by mouth daily 90 capsule 1    ondansetron (ZOFRAN-ODT) 4 mg disintegrating tablet Take 1 tablet (4 mg total) by mouth every 6 (six) hours as needed for nausea or vomiting (Patient not taking: Reported on 8/26/2021) 20 tablet 0    pioglitazone (ACTOS) 30 mg tablet Take 1 tablet (30 mg total) by mouth daily 90 tablet 1    pregabalin (LYRICA) 75 mg capsule TAKE ONE CAPSULE EVERY DAY 90 capsule 1    simvastatin (ZOCOR) 10 mg tablet Take 10 mg by mouth daily at bedtime      spironolactone (ALDACTONE) 25 mg tablet Take 25 mg by mouth daily       zolpidem (AMBIEN) 10 mg tablet TAKE 1 TABLET (10 MG TOTAL) BY MOUTH DAILY AT BEDTIME AS NEEDED FOR SLEEP 90 tablet 0     No current facility-administered medications for this visit         ALLERGIES:  Allergies   Allergen Reactions    Metformin Diarrhea    Clonidine Rash     Patch        Health Maintenance     Health Maintenance   Topic Date Due    Hepatitis C Screening  Never done    COVID-19 Vaccine (1) Never done    BMI: Followup Plan  Never done    PT PLAN OF CARE  11/08/2020    Breast Cancer Screening: Mammogram  11/14/2020    Lung Cancer Screening  06/02/2021    OT PLAN OF CARE  08/07/2021    Influenza Vaccine (1) 09/01/2021    HEMOGLOBIN A1C  11/12/2021    DM Eye Exam  01/04/2022    Medicare Annual Wellness Visit (AWV)  02/15/2022    Depression Remission PHQ  02/15/2022    Diabetic Foot Exam  05/06/2022    Cervical Cancer Screening  07/26/2022    BMI: Adult  08/12/2022    Colorectal Cancer Screening  05/22/2024    DTaP,Tdap,and Td Vaccines (3 - Td or Tdap) 02/15/2030    Pneumococcal Vaccine: Pediatrics (0 to 5 Years) and At-Risk Patients (6 to 59 Years) (2 of 2 - PPSV23) 06/19/2031    HIV Screening  Completed    HIB Vaccine  Aged Out    Hepatitis B Vaccine  Aged Out    IPV Vaccine  Aged Out    Hepatitis A Vaccine  Aged Out    Meningococcal ACWY Vaccine  Aged Out    HPV Vaccine  Aged [employer] History   Administered Date(s) Administered    INFLUENZA 09/16/2011    Pneumococcal Polysaccharide PPV23 12/15/2017    Tdap 12/15/2017, 02/15/2020         Marco A Kaplan MD   [address]  9/2/2021  4:07 PM    Parts of this note were dictated using M*Modal dictation software and may have sounds-like errors due to variation in pronunciation

## 2021-09-02 NOTE — ASSESSMENT & PLAN NOTE
Patient states she requires medication refills on >20 medications  Unsure exactly which medications she needs or takes   Requires full medication reconciliation     - Patient scheduled 9/7 for Med Rec

## 2021-09-02 NOTE — ASSESSMENT & PLAN NOTE
Recent ED visit for blood sugars in the 50s and 80s with lightheadedness and weakness  States she took her insulin that day but did not eat  Seen by endocrinology 8/18 and some changes were made to her insulin regimen  Placed Dexcom G6 Continuous blood sugar monitoring device 9/1/2021  Reports sugars have been in the 160s      - Continue current insulin regimen:   - Basaglar 80 units HS   - Humulin 500, 40 units with meals or 20 units with skipped  Meals   - Trulicity weekly  - Continue Oral medications (actos 30mg daily)  - Continue to monitor blood sugars  - Strict ED precautions for hypoglycemic/hyperglycemic episodes

## 2021-09-07 ENCOUNTER — OFFICE VISIT (OUTPATIENT)
Dept: FAMILY MEDICINE CLINIC | Facility: CLINIC | Age: 56
End: 2021-09-07
Payer: MEDICARE

## 2021-09-07 VITALS
SYSTOLIC BLOOD PRESSURE: 130 MMHG | DIASTOLIC BLOOD PRESSURE: 80 MMHG | BODY MASS INDEX: 30.96 KG/M2 | HEIGHT: 69 IN | TEMPERATURE: 97.9 F | WEIGHT: 209 LBS

## 2021-09-07 DIAGNOSIS — E78.5 HYPERLIPIDEMIA ASSOCIATED WITH TYPE 2 DIABETES MELLITUS (HCC): ICD-10-CM

## 2021-09-07 DIAGNOSIS — E61.1 IRON DEFICIENCY: ICD-10-CM

## 2021-09-07 DIAGNOSIS — E11.69 HYPERLIPIDEMIA ASSOCIATED WITH TYPE 2 DIABETES MELLITUS (HCC): ICD-10-CM

## 2021-09-07 DIAGNOSIS — IMO0002 UNCONTROLLED TYPE 2 DIABETES MELLITUS WITH COMPLICATION, WITH LONG-TERM CURRENT USE OF INSULIN: ICD-10-CM

## 2021-09-07 DIAGNOSIS — I10 HYPERTENSION, UNSPECIFIED TYPE: ICD-10-CM

## 2021-09-07 DIAGNOSIS — L03.116 CELLULITIS OF LEFT LOWER EXTREMITY: Primary | ICD-10-CM

## 2021-09-07 PROCEDURE — 99213 OFFICE O/P EST LOW 20 MIN: CPT | Performed by: FAMILY MEDICINE

## 2021-09-07 RX ORDER — CLINDAMYCIN HYDROCHLORIDE 300 MG/1
300 CAPSULE ORAL 4 TIMES DAILY
Qty: 28 CAPSULE | Refills: 0 | Status: SHIPPED | OUTPATIENT
Start: 2021-09-07 | End: 2021-09-14

## 2021-09-07 RX ORDER — QUINIDINE GLUCONATE 324 MG
240 TABLET, EXTENDED RELEASE ORAL
Qty: 90 TABLET | Refills: 1 | Status: SHIPPED | OUTPATIENT
Start: 2021-09-07 | End: 2021-10-01

## 2021-09-08 PROBLEM — L03.116 CELLULITIS OF LEFT LOWER EXTREMITY: Status: ACTIVE | Noted: 2021-09-08

## 2021-09-08 NOTE — ASSESSMENT & PLAN NOTE
- Pt has area of concern on L-lower calf  Area started as possible bug bite and has grown a bit since she tried to pop it  - 2 cm - erythematous, raised area  Warm to touch  Central area appears to have been draining but now scabbed over  + TTP  Neg streaking  Plan;  - Will start pt on PO Clindamycin 300 mg QID for 7 days  - Discussed taking medication with food, adding some yogurt to while using    - Discussed using warm compress to help draw out any drainage, refrain from popping    - Continue to monitor leg for and increase in size, streaking of redness  Monitor for fevers at home  - Contact office if no change or worse in next week  - ED precautions discussed

## 2021-09-08 NOTE — ASSESSMENT & PLAN NOTE
- Controlled in office today 130/80    - Pt recently followed up with Cardiology   - Negative for renal artery stenosis-2018  - negative for hyperaldosteronism-2021  - Normal TSH-June 2020    Plan:  - Continue on current regimen -  Amlodipine 10 mg daily  Metoprolol succinate 200 mg daily  Lisinopril 40 mg daily  Hydralizine 100 mg TID PRN per Cardiology - pt only taking once per day  - Continue working on increasing activity to try and reach 30 min of dedicated walking 5x per week  - Continue to work on diet as discussed

## 2021-09-08 NOTE — PROGRESS NOTES
Family Medicine Follow-Up Office Visit  Noah Arreola 55 y o  female   MRN: [id number] : 1965  ENCOUNTER: 2021 2:57 PM    Assessment and Plan   Hypertension  - Controlled in office today 130/80    - Pt recently followed up with Cardiology   - Negative for renal artery stenosis-  - negative for hyperaldosteronism-  - Normal TSH-2020    Plan:  - Continue on current regimen -  Amlodipine 10 mg daily  Metoprolol succinate 200 mg daily  Lisinopril 40 mg daily  Hydralizine 100 mg TID PRN per Cardiology - pt only taking once per day  - Continue working on increasing activity to try and reach 30 min of dedicated walking 5x per week  - Continue to work on diet as discussed  Cellulitis of left lower extremity  - Pt has area of concern on L-lower calf  Area started as possible bug bite and has grown a bit since she tried to pop it  - 2 cm - erythematous, raised area  Warm to touch  Central area appears to have been draining but now scabbed over  + TTP  Neg streaking  Plan;  - Will start pt on PO Clindamycin 300 mg QID for 7 days  - Discussed taking medication with food, adding some yogurt to while using    - Discussed using warm compress to help draw out any drainage, refrain from popping    - Continue to monitor leg for and increase in size, streaking of redness  Monitor for fevers at home  - Contact office if no change or worse in next week  - ED precautions discussed  Hyperlipidemia associated with type 2 diabetes mellitus ([facility] [address] )    Lab Results   Component Value Date    HGBA1C 11 1 (A) 2021     - Pt had Simvastatin and Atorvastatin on her med list   - Med rec completed today, pt only taking Atorvastatin and Fenofibrate  - Will discontinue Simvastatin  - Continue Atorvastatin 40 mg daily and Fenofibrate 145 mg daily      Uncontrolled type 2 diabetes mellitus with complication, with long-term current use of insulin ([facility])    Lab Results   Component Value Date    HGBA1C 11 1 (A) 08/12/2021     - Pt following closely with Endo  - Currently only on Humilin as directed 40 units TID with meals, 20 unit if she does not eat  Long acting insulin was held due to some low BGs   - Pioglitazone 22NP daily  - Trulicity weekly  - Pt is wearing CGM as directed  - Will follow up for further management with Endo   - Discussed Hypoglycemia protocols while at home and ED precautions  Chief Complaint     Chief Complaint   Patient presents with    Follow-up       History of Present Illness   Marina Zhangs CE Douglas is a 55y o -year-old female who presents today for Med rec and follow up of her HTN, HLD, uncontrolled DM and also with complaints of L-lower leg redness and pain  Reviewed all pts medications today and updated her list  Pt has been following up with Endocrinology and Cardiology as directed  She recently started with CGM  Her BG levels are being monitored with Humulin TID, Pioglitazone, and trulicity  Pt states she is tolerating that well with no low BG levels measured  Pt states she is not monitoring her blood pressures at home  She is taking all her medication for b/p oncer per day  Pt has concern for area of redness on her L-lower calf  She states is started as a possible bug bite  She denies any hiking or gardening, no hx of tick removal  She tried to pop and drain the lesion and now feels like it is more painful and getting larger  Pt denies a f/c/nv/d  She denies any urinary dicomfort  No increased sob, no chest pain  She has no other concerns today  Review of Systems   Review of Systems   Constitutional: Negative for chills, fatigue, fever and unexpected weight change  HENT: Negative for congestion, postnasal drip, rhinorrhea, sneezing, sore throat and trouble swallowing  Eyes: Negative for pain and redness  Respiratory: Negative for cough, shortness of breath and wheezing  Cardiovascular: Negative for chest pain and palpitations     Gastrointestinal: Negative for abdominal pain, blood in stool, constipation, diarrhea, nausea and vomiting  Endocrine: Negative for polydipsia and polyuria  Genitourinary: Negative for difficulty urinating, dysuria and hematuria  Musculoskeletal: Positive for arthralgias and back pain  Negative for neck pain  Skin: Positive for wound  Negative for pallor and rash  Neurological: Positive for headaches  Negative for dizziness, speech difficulty, light-headedness and numbness  Psychiatric/Behavioral: Negative for confusion and suicidal ideas  The patient is nervous/anxious          Active Problem List     Patient Active Problem List   Diagnosis    Anxiety    Cardiac murmur    Carpal tunnel syndrome of left wrist    Change in bowel habit    Chronic pain disorder    Cough    [facility]    Retinal hemorrhage due to secondary diabetes ([facility]    Diabetic peripheral neuropathy (HCC)    Dizziness    Uncontrolled hypertension    Fibromyalgia    Gastroesophageal reflux disease with esophagitis    Hemangioma of bone    Hyperlipidemia associated with type 2 diabetes mellitus ([facility]    Hypertension    Hypoestrogenism    Low back pain    Lumbar radiculopathy    Medically complex patient    Neuropathy    Non compliance with medical treatment    Noncompliance with diet and medication regimen    Overweight    Primary insomnia    Right-sided thoracic back pain    Sciatica of left side    Screening for colon cancer    Shingles    Thoracic radiculitis    Tobacco abuse    Uncontrolled type 2 diabetes mellitus with complication, with long-term current use of insulin (HCC)    Vertebral body hemangioma    Vertigo    Vaginal discharge    Yeast vaginitis    Recurrent vaginitis    Abnormal urinalysis    Thoracic aortic aneurysm without rupture (HCC)    Epigastric pain    Urinary tract infection with hematuria    Bacterial vaginosis    Abscess    Palpitations    Nasal vestibulitis    Orthopnea    CHANTALE (obstructive sleep apnea)    Diarrhea    Trigger finger, right middle finger    Trigger finger, right ring finger    Hypoglycemia    Polypharmacy    Cellulitis of left lower extremity       Past Medical History, Past Surgical History, Family History, and Social History were reviewed and updated today as appropriate  Objective   /80   Temp 97 9 °F (36 6 °C)   Ht 5' 9" (1 753 m)   Wt 94 8 kg (209 lb)   BMI 30 86 kg/m²     Physical Exam  Vitals and nursing note reviewed  Constitutional:       General: She is not in acute distress  Appearance: Normal appearance  She is obese  She is not ill-appearing  HENT:      Head: Normocephalic and atraumatic  Right Ear: Tympanic membrane and external ear normal       Left Ear: Tympanic membrane and external ear normal       Nose: Nose normal       Mouth/Throat:      Mouth: Mucous membranes are moist       Pharynx: No oropharyngeal exudate  Eyes:      General: No scleral icterus  Extraocular Movements: Extraocular movements intact  Conjunctiva/sclera: Conjunctivae normal       Pupils: Pupils are equal, round, and reactive to light  Cardiovascular:      Rate and Rhythm: Normal rate and regular rhythm  Pulses: Normal pulses  Heart sounds: Normal heart sounds  No murmur heard  Pulmonary:      Effort: Pulmonary effort is normal       Breath sounds: Normal breath sounds  No wheezing or rales  Abdominal:      General: Bowel sounds are normal       Palpations: Abdomen is soft  Tenderness: There is no abdominal tenderness  There is no guarding or rebound  Musculoskeletal:         General: Tenderness present  Normal range of motion  Cervical back: Normal range of motion  Right lower leg: No edema  Left lower leg: No edema  Lymphadenopathy:      Cervical: No cervical adenopathy  Skin:     Capillary Refill: Capillary refill takes less than 2 seconds  Findings: Erythema and lesion present   No bruising or rash       Comments: L-Lower ext: posterior lower calf - 2 cm erythematous area, central scabbed area where may have been draining, warm to touch, + TTP, no current discharge to culture  Neurological:      General: No focal deficit present  Mental Status: She is alert and oriented to person, place, and time  Cranial Nerves: No cranial nerve deficit  Motor: No weakness     Psychiatric:         Mood and Affect: Mood normal          Behavior: Behavior normal        Diabetic Foot Exam    Pertinent Laboratory/Diagnostic Studies:  Lab Results   Component Value Date    GLUCOSE 230 (H) 07/19/2015    BUN 13 08/26/2021    CREATININE 0 61 08/26/2021    CALCIUM 8 5 08/26/2021     07/19/2015    K 3 0 (L) 08/26/2021    CO2 29 08/26/2021     08/26/2021     Lab Results   Component Value Date    ALT 16 08/26/2021    AST 14 (L) 08/26/2021    ALKPHOS 70 5 08/26/2021    BILITOT 0 43 07/19/2015       Lab Results   Component Value Date    WBC 9 81 08/26/2021    HGB 12 9 08/26/2021    HCT 39 0 08/26/2021    MCV 85 08/26/2021     08/26/2021       No results found for: TSH    No results found for: CHOL  Lab Results   Component Value Date    TRIG 133 7 04/02/2021     Lab Results   Component Value Date    HDL 45 (L) 04/02/2021     Lab Results   Component Value Date    LDLCALC 44 04/02/2021     Lab Results   Component Value Date    HGBA1C 11 1 (A) 08/12/2021       Results for orders placed or performed during the hospital encounter of 08/26/21   CBC and differential   Result Value Ref Range    WBC 9 81 4 31 - 10 16 Thousand/uL    RBC 4 57 3 81 - 5 12 Million/uL    Hemoglobin 12 9 11 5 - 15 4 g/dL    Hematocrit 39 0 34 8 - 46 1 %    MCV 85 82 - 98 fL    MCH 28 2 26 8 - 34 3 pg    MCHC 33 1 31 4 - 37 4 g/dL    RDW 14 7 11 6 - 15 1 %    MPV 10 5 8 9 - 12 7 fL    Platelets 611 261 - 656 Thousands/uL    Neutrophils Relative 72 43 - 75 %    Immat GRANS % 0 0 - 2 %    Lymphocytes Relative 20 14 - 44 %    Monocytes Relative 7 4 - 12 %    Eosinophils Relative 1 0 - 6 %    Basophils Relative 0 0 - 1 %    Neutrophils Absolute 7 09 1 85 - 7 62 Thousands/µL    Immature Grans Absolute 0 01 0 00 - 0 20 Thousand/uL    Lymphocytes Absolute 1 97 0 60 - 4 47 Thousands/µL    Monocytes Absolute 0 65 0 17 - 1 22 Thousand/µL    Eosinophils Absolute 0 06 0 00 - 0 61 Thousand/µL    Basophils Absolute 0 03 0 00 - 0 10 Thousands/µL   Comprehensive metabolic panel   Result Value Ref Range    Sodium 142 133 - 145 mmol/L    Potassium 3 0 (L) 3 5 - 5 0 mmol/L    Chloride 106 96 - 108 mmol/L    CO2 29 22 - 33 mmol/L    ANION GAP 7 4 - 13 mmol/L    BUN 13 6 - 20 mg/dL    Creatinine 0 61 0 40 - 1 10 mg/dL    Glucose 232 (H) 65 - 140 mg/dL    Calcium 8 5 8 4 - 10 2 mg/dL    Corrected Calcium 9 1 8 3 - 10 1 mg/dL    AST 14 (L) 15 - 41 U/L    ALT 16 5 - 54 U/L    Alkaline Phosphatase 70 5 35 - 140 U/L    Total Protein 6 3 (L) 6 4 - 8 3 g/dL    Albumin 3 3 (L) 3 4 - 4 8 g/dL    Total Bilirubin 0 41 0 30 - 1 20 mg/dL    eGFR 118 ml/min/1 73sq m   Troponin I   Result Value Ref Range    Troponin I <0 03 0 00 - 0 07 ng/mL   B-Type Natriuretic Peptide(BNP) CA, GH, EA [place] Only   Result Value Ref Range    BNP 82 9 1 - 100 pg/mL   UA w Reflex to Microscopic w Reflex to Culture    Specimen: Urine, Clean Catch   Result Value Ref Range    Color, UA Yellow Yellow    Clarity, UA Clear Clear    Specific [place], UA 1 010 1 001 - 1 030    pH, UA 6 0 5 0, 5 5, 6 0, 6 5, 7 0, 7 5, 8 0    Leukocytes, UA Negative Negative    Nitrite, UA Negative Negative    Protein, UA 2+ (A) Negative, Interference- unable to analyze mg/dl    Glucose, UA 1+ (A) Negative mg/dl    Ketones, UA Negative Negative mg/dl    Urobilinogen, UA 0 2 0 2, 1 0 E U /dl E U /dl    Bilirubin, UA Negative Negative    Blood, UA Trace-Intact (A) Negative   Urine Microscopic   Result Value Ref Range    RBC, UA 0-1 None Seen, 0-1, 1-2, 2-4, 0-5 /hpf    WBC, UA 0-1 None Seen, 0-1, 1-2, 0-5, 2-4 /hpf Epithelial Cells Moderate (A) None Seen, Occasional /hpf    Bacteria, UA None Seen None Seen, Occasional /hpf   Fingerstick Glucose (POCT)   Result Value Ref Range    POC Glucose 245 (H) 65 - 140 mg/dl       No orders of the defined types were placed in this encounter  Current Medications     Current Outpatient Medications   Medication Sig Dispense Refill    ALPRAZolam (XANAX) 0 5 mg tablet "ONE-HALF" TABLET TWO TIMES A DAY 90 tablet 1    aluminum-magnesium hydroxide-simethicone (MAALOX) 200-200-20 MG/5ML SUSP Take 20 mL by mouth 4 (four) times a day (before meals and at bedtime) 355 mL 0    amLODIPine (NORVASC) 10 mg tablet Take 10 mg by mouth daily      atorvastatin (LIPITOR) 40 mg tablet Take 1 tablet (40 mg total) by mouth daily 90 tablet 2    bacitracin topical ointment 500 units/g topical ointment Apply to bilateral nares twice per day   (Patient not taking: Reported on 4/30/2021) 15 g 3    Blood Pressure Monitoring (Sphygmomanometer) MISC Use 2 (two) times a day (Patient not taking: Reported on 7/13/2021) 1 each 0    clindamycin (CLEOCIN) 300 MG capsule Take 1 capsule (300 mg total) by mouth 4 (four) times a day for 7 days 28 capsule 0    cyclobenzaprine (FLEXERIL) 10 mg tablet Take 1 tablet (10 mg total) by mouth 3 (three) times a day as needed for muscle spasms for up to 10 days 30 tablet 0    dexamethasone (DECADRON) 1 mg tablet Take 1 tablet (1 mg total) by mouth 2 (two) times a day with meals Take tablet between 10-11pm and then have lab next day in the morning 7-9am  1 tablet 0    dicyclomine (BENTYL) 20 mg tablet Take 20 mg by mouth every 6 (six) hours      Dulaglutide (Trulicity) 3 RB/1 0IQ SOPN Inject 0 5 mL (3 mg total) under the skin once a week 6 mL 1    Durezol 0 05 % EMUL INSTILL 1 DROP INTO RIGHT EYE 4 TIMES A DAY (Patient not taking: Reported on 8/26/2021)      ergocalciferol (VITAMIN D2) 50,000 units Take 1 capsule (50,000 Units total) by mouth 2 (two) times a week (Patient not taking: Reported on 8/26/2021) 24 capsule 1    fenofibrate (TRICOR) 145 mg tablet TAKE ONE TABLET EVERY DAY 90 tablet 6    ferrous gluconate ([employer]) 240 (27 FE) MG tablet Take 1 tablet (240 mg total) by mouth 3 (three) times a day with meals 90 tablet 1    fluticasone-umeclidinium-vilanterol (Trelegy Ellipta) 100-62 5-25 MCG/INH inhaler Inhale 1 puff daily Rinse mouth after use   60 each 1    hydrALAZINE (APRESOLINE) 50 mg tablet Take 2 tablets (100 mg total) by mouth 3 (three) times a day 200 tablet 3    hydrochlorothiazide (HYDRODIURIL) 25 mg tablet TAKE ONE TABLET EVERY DAY 90 tablet 1    Insulin Glargine (BASAGLAR KWIKPEN SC) Inject 90 Units under the skin (Patient not taking: Reported on 8/26/2021)      Insulin Pen Needle (UNIFINE PENTIPS PLUS) 31G X 6 MM MISC 1 Device by Does not apply route 4 (four) times a day      Insulin Regular Human, Conc, (HumuLIN R U-500 KwikPen) 500 units/mL CONCENTRATED U-500 injection pen Inject 40 Units under the skin 3 (three) times a day before meals 3 pen 3    Lancets MISC 1 Device by Does not apply route 4 (four) times a day (Patient not taking: Reported on 7/13/2021)      lisinopril (ZESTRIL) 40 mg tablet TAKE ONE TABLET TWO TIMES A  tablet 1    Magnesium 400 MG TABS Take by mouth      meclizine (ANTIVERT) 25 mg tablet Take 1 tablet (25 mg total) by mouth every 8 (eight) hours as needed for dizziness 30 tablet 0    metoprolol succinate (TOPROL-XL) 200 MG 24 hr tablet TAKE 1 TABLET EVERY DAY BY ORAL ROUTE 50 tablet 3    mupirocin (BACTROBAN) 2 % ointment Apply topically 3 (three) times a day 15 g 0    naproxen (NAPROSYN) 500 mg tablet Take 1 tablet (500 mg total) by mouth 2 (two) times a day with meals 30 tablet 0    omeprazole (PriLOSEC) 40 MG capsule Take 1 capsule (40 mg total) by mouth daily 90 capsule 1    ondansetron (ZOFRAN-ODT) 4 mg disintegrating tablet Take 1 tablet (4 mg total) by mouth every 6 (six) hours as needed for nausea or vomiting (Patient not taking: Reported on 8/26/2021) 20 tablet 0    pioglitazone (ACTOS) 30 mg tablet Take 1 tablet (30 mg total) by mouth daily 90 tablet 1    pregabalin (LYRICA) 75 mg capsule TAKE ONE CAPSULE EVERY DAY 90 capsule 1    spironolactone (ALDACTONE) 25 mg tablet Take 25 mg by mouth daily       zolpidem (AMBIEN) 10 mg tablet TAKE 1 TABLET (10 MG TOTAL) BY MOUTH DAILY AT BEDTIME AS NEEDED FOR SLEEP 90 tablet 0     No current facility-administered medications for this visit         ALLERGIES:  Allergies   Allergen Reactions    Metformin Diarrhea    Clonidine Rash     Patch        Health Maintenance     Health Maintenance   Topic Date Due    Hepatitis C Screening  Never done    COVID-19 Vaccine (1) Never done    BMI: Followup Plan  Never done    PT PLAN OF CARE  11/08/2020    Breast Cancer Screening: Mammogram  11/14/2020    Lung Cancer Screening  06/02/2021    OT PLAN OF CARE  08/07/2021    Influenza Vaccine (1) 09/01/2021    HEMOGLOBIN A1C  11/12/2021    DM Eye Exam  01/04/2022    Medicare Annual Wellness Visit (AWV)  02/15/2022    Depression Remission PHQ  02/15/2022    Diabetic Foot Exam  05/06/2022    Cervical Cancer Screening  07/26/2022    BMI: Adult  09/07/2022    Colorectal Cancer Screening  05/22/2024    DTaP,Tdap,and Td Vaccines (3 - Td or Tdap) 02/15/2030    Pneumococcal Vaccine: Pediatrics (0 to 5 Years) and At-Risk Patients (6 to 59 Years) (2 of 2 - PPSV23) 06/19/2031    HIV Screening  Completed    HIB Vaccine  Aged Out    Hepatitis B Vaccine  Aged Out    IPV Vaccine  Aged Out    Hepatitis A Vaccine  Aged Out    Meningococcal ACWY Vaccine  Aged Out    HPV Vaccine  Aged [employer] History   Administered Date(s) Administered    INFLUENZA 09/16/2011    Pneumococcal Polysaccharide PPV23 12/15/2017    Tdap 12/15/2017, 02/15/2020         Sonny Barber DO   [address]  9/8/2021  2:57 PM    Parts of this note were dictated using M*Modal dictation software and may have sounds-like errors due to variation in pronunciation

## 2021-09-08 NOTE — ASSESSMENT & PLAN NOTE
Lab Results   Component Value Date    HGBA1C 11 1 (A) 08/12/2021     - Pt had Simvastatin and Atorvastatin on her med list   - Med rec completed today, pt only taking Atorvastatin and Fenofibrate  - Will discontinue Simvastatin  - Continue Atorvastatin 40 mg daily and Fenofibrate 145 mg daily

## 2021-09-08 NOTE — ASSESSMENT & PLAN NOTE
Lab Results   Component Value Date    HGBA1C 11 1 (A) 08/12/2021     - Pt following closely with Endo  - Currently only on Humilin as directed 40 units TID with meals, 20 unit if she does not eat  Long acting insulin was held due to some low BGs   - Pioglitazone 49EN daily  - Trulicity weekly  - Pt is wearing CGM as directed  - Will follow up for further management with Endo   - Discussed Hypoglycemia protocols while at home and ED precautions

## 2021-09-12 ENCOUNTER — HOSPITAL ENCOUNTER (EMERGENCY)
Facility: HOSPITAL | Age: 56
Discharge: HOME | End: 2021-09-12
Attending: EMERGENCY MEDICINE | Admitting: EMERGENCY MEDICINE
Payer: MEDICARE

## 2021-09-12 VITALS
HEART RATE: 90 BPM | RESPIRATION RATE: 18 BRPM | OXYGEN SATURATION: 99 % | WEIGHT: 200 LBS | BODY MASS INDEX: 29.53 KG/M2 | TEMPERATURE: 98.3 F | SYSTOLIC BLOOD PRESSURE: 214 MMHG | DIASTOLIC BLOOD PRESSURE: 93 MMHG

## 2021-09-12 DIAGNOSIS — M79.89 LEG SWELLING: ICD-10-CM

## 2021-09-12 DIAGNOSIS — L03.119 CELLULITIS, LEG: Primary | ICD-10-CM

## 2021-09-12 PROCEDURE — 99284 EMERGENCY DEPT VISIT MOD MDM: CPT

## 2021-09-12 PROCEDURE — 10060 I&D ABSCESS SIMPLE/SINGLE: CPT | Performed by: EMERGENCY MEDICINE

## 2021-09-12 PROCEDURE — 99284 EMERGENCY DEPT VISIT MOD MDM: CPT | Performed by: EMERGENCY MEDICINE

## 2021-09-12 RX ORDER — LIDOCAINE HYDROCHLORIDE AND EPINEPHRINE 10; 10 MG/ML; UG/ML
1 INJECTION, SOLUTION INFILTRATION; PERINEURAL ONCE
Status: COMPLETED | OUTPATIENT
Start: 2021-09-12 | End: 2021-09-12

## 2021-09-12 RX ADMIN — LIDOCAINE HYDROCHLORIDE,EPINEPHRINE BITARTRATE 1 ML: 10; .01 INJECTION, SOLUTION INFILTRATION; PERINEURAL at 17:45

## 2021-09-12 NOTE — ED PROVIDER NOTES
History  Chief Complaint   Patient presents with    Leg Pain     LLE swelling x 1 week, seen in ER 2 weeks ago for wound back of left leg, denies SOB     One week of increase in chronic bilateral edema affecting only the left leg, and associated with cellulitis to postero-lateral aspect of mid-calf  TGreated for 4-5 days with clinda  Area reduced in size, but central area still painful  No f/c/n/v/cp/sob/cough/sput/abdo pain/urinary/bowel symp  PMH abscesses other sites, mrsa  Recent tattoo several centimeters away from central lesion 1 month ago  Prior to Admission Medications   Prescriptions Last Dose Informant Patient Reported? Taking?    ALPRAZolam (XANAX) 0 5 mg tablet  Self No No   Sig: "ONE-HALF" TABLET TWO TIMES A DAY   Blood Pressure Monitoring (Sphygmomanometer) MISC  Self No No   Sig: Use 2 (two) times a day   Patient not taking: Reported on 2021   Dulaglutide (Trulicity) 3 CO/5 0PW SOPN  Self No No   Sig: Inject 0 5 mL (3 mg total) under the skin once a week   Durezol 0 05 % EMUL  Self Yes No   Sig: INSTILL 1 DROP INTO RIGHT EYE 4 TIMES A DAY   Patient not taking: Reported on 2021   Insulin Glargine (BASAGLAR KWIKPEN SC)  Self Yes No   Sig: Inject 90 Units under the skin   Patient not taking: Reported on 2021   Insulin Pen Needle (UNIFINE PENTIPS PLUS) 31G X 6 MM MISC  Self Yes No   Si Device by Does not apply route 4 (four) times a day   Insulin Regular Human, Conc, (HumuLIN R U-500 KwikPen) 500 units/mL CONCENTRATED U-500 injection pen  Self No No   Sig: Inject 40 Units under the skin 3 (three) times a day before meals   Lancets MISC  Self Yes No   Si Device by Does not apply route 4 (four) times a day   Patient not taking: Reported on 2021   Magnesium 400 MG TABS  Self Yes No   Sig: Take by mouth   aluminum-magnesium hydroxide-simethicone (MAALOX) 200-200-20 MG/5ML SUSP  Self No No   Sig: Take 20 mL by mouth 4 (four) times a day (before meals and at bedtime) amLODIPine (NORVASC) 10 mg tablet  Self Yes No   Sig: Take 10 mg by mouth daily   atorvastatin (LIPITOR) 40 mg tablet  Self No No   Sig: Take 1 tablet (40 mg total) by mouth daily   bacitracin topical ointment 500 units/g topical ointment  Self No No   Sig: Apply to bilateral nares twice per day     Patient not taking: Reported on 4/30/2021   clindamycin (CLEOCIN) 300 MG capsule   No No   Sig: Take 1 capsule (300 mg total) by mouth 4 (four) times a day for 7 days   cyclobenzaprine (FLEXERIL) 10 mg tablet  Self No No   Sig: Take 1 tablet (10 mg total) by mouth 3 (three) times a day as needed for muscle spasms for up to 10 days   dexamethasone (DECADRON) 1 mg tablet  Self No No   Sig: Take 1 tablet (1 mg total) by mouth 2 (two) times a day with meals Take tablet between 10-11pm and then have lab next day in the morning 7-9am    dicyclomine (BENTYL) 20 mg tablet  Self Yes No   Sig: Take 20 mg by mouth every 6 (six) hours   ergocalciferol (VITAMIN D2) 50,000 units  Self No No   Sig: Take 1 capsule (50,000 Units total) by mouth 2 (two) times a week   Patient not taking: Reported on 8/26/2021   fenofibrate (TRICOR) 145 mg tablet  Self No No   Sig: TAKE ONE TABLET EVERY DAY   ferrous gluconate ([employer] Iron) 240 (27 FE) MG tablet   No No   Sig: Take 1 tablet (240 mg total) by mouth 3 (three) times a day with meals   fluticasone-umeclidinium-vilanterol (Trelegy Ellipta) 100-62 5-25 MCG/INH inhaler  Self No No   Sig: Inhale 1 puff daily Rinse mouth after use    hydrALAZINE (APRESOLINE) 50 mg tablet  Self No No   Sig: Take 2 tablets (100 mg total) by mouth 3 (three) times a day   hydrochlorothiazide (HYDRODIURIL) 25 mg tablet  Self No No   Sig: TAKE ONE TABLET EVERY DAY   lisinopril (ZESTRIL) 40 mg tablet  Self No No   Sig: TAKE ONE TABLET TWO TIMES A DAY   meclizine (ANTIVERT) 25 mg tablet   No No   Sig: Take 1 tablet (25 mg total) by mouth every 8 (eight) hours as needed for dizziness   metoprolol succinate (TOPROL-XL) 200 MG 24 hr tablet  Self No No   Sig: TAKE 1 TABLET EVERY DAY BY ORAL ROUTE   mupirocin (BACTROBAN) 2 % ointment   No No   Sig: Apply topically 3 (three) times a day   naproxen (NAPROSYN) 500 mg tablet  Self No No   Sig: Take 1 tablet (500 mg total) by mouth 2 (two) times a day with meals   omeprazole (PriLOSEC) 40 MG capsule  Self No No   Sig: Take 1 capsule (40 mg total) by mouth daily   ondansetron (ZOFRAN-ODT) 4 mg disintegrating tablet  Self No No   Sig: Take 1 tablet (4 mg total) by mouth every 6 (six) hours as needed for nausea or vomiting   Patient not taking: Reported on 2021   pioglitazone (ACTOS) 30 mg tablet  Self No No   Sig: Take 1 tablet (30 mg total) by mouth daily   pregabalin (LYRICA) 75 mg capsule  Self No No   Sig: TAKE ONE CAPSULE EVERY DAY   spironolactone (ALDACTONE) 25 mg tablet  Self Yes No   Sig: Take 25 mg by mouth daily    zolpidem (AMBIEN) 10 mg tablet  Self No No   Sig: TAKE 1 TABLET (10 MG TOTAL) BY MOUTH DAILY AT BEDTIME AS NEEDED FOR SLEEP      Facility-Administered Medications: None       Past Medical History:   Diagnosis Date    Anxiety     Aortic aneurysm (HCC)     Arthritis     Depression     Diabetes mellitus (HCC)     Fibromyalgia     GERD (gastroesophageal reflux disease)     GERD (gastroesophageal reflux disease)     H/O cardiovascular stress test 2018    no ischemia  EF 70%   H/O echocardiogram 2019    EF 60%  Mild LVH  trivial effusion      Hyperlipidemia     Hypertension     Migraines     Psychiatric disorder     anxiety       Past Surgical History:   Procedure Laterality Date    BACK SURGERY      Lumbar epidural steroid injection    CARPAL TUNNEL RELEASE Left      SECTION      CHOLECYSTECTOMY      COLONOSCOPY      incomplete    COLONOSCOPY      EYE SURGERY      HYSTERECTOMY      Total    OVARIAN CYST REMOVAL      TUBAL LIGATION      UPPER GASTROINTESTINAL ENDOSCOPY         Family History   Problem Relation Age of Onset    Hypertension Mother     Arthritis Mother     Diabetes Father     Other Sister         renal cell carcinoma    Diabetes Other      I have reviewed and agree with the history as documented  E-Cigarette/Vaping    E-Cigarette Use Never User      E-Cigarette/Vaping Substances    Nicotine No     THC No     CBD No     Flavoring No     Other No     Unknown No      Social History     Tobacco Use    Smoking status: Current Every Day Smoker     Packs/day: 1 00     Years: 30 00     Pack years: 30 00     Types: Cigarettes    Smokeless tobacco: Never Used   Vaping Use    Vaping Use: Never used   Substance Use Topics    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   Drug use: Yes     Types: Marijuana     Comment: medical; seldom use       Review of Systems   Constitutional: Negative for fever  HENT: Negative for rhinorrhea  Eyes: Negative for visual disturbance  Respiratory: Negative for shortness of breath  Cardiovascular: Positive for leg swelling  Negative for chest pain  Gastrointestinal: Negative for abdominal pain, diarrhea and vomiting  Endocrine: Negative for polydipsia  Genitourinary: Negative for dysuria, frequency and hematuria  Musculoskeletal: Negative for neck stiffness  Skin: Positive for rash and wound  Allergic/Immunologic: Negative for immunocompromised state  Neurological: Negative for speech difficulty, weakness and numbness  Psychiatric/Behavioral: Negative for suicidal ideas  Physical Exam  Physical Exam  Constitutional:       General: She is not in acute distress  HENT:      Head: Normocephalic and atraumatic  Mouth/Throat:      Pharynx: Oropharynx is clear  Eyes:      Conjunctiva/sclera: Conjunctivae normal    Cardiovascular:      Rate and Rhythm: Regular rhythm  Heart sounds: Normal heart sounds  Pulmonary:      Effort: Pulmonary effort is normal       Breath sounds: Normal breath sounds     Abdominal:      General: Bowel sounds are normal       Palpations: Abdomen is soft  There is no mass  Tenderness: There is no abdominal tenderness  There is no guarding  Musculoskeletal:         General: Swelling and tenderness present  Cervical back: Normal range of motion and neck supple  Right lower leg: Edema present  Left lower leg: Edema present  Skin:     General: Skin is warm and dry  Capillary Refill: Capillary refill takes less than 2 seconds  Comments: There is 3+ edema to left leg, 1+ to right  There is also a 5x5cm area of erythema, induration, tenderness but not fluctuance  To post-lat aspect mid-calf  Neurological:      General: No focal deficit present  Mental Status: She is alert and oriented to person, place, and time  Psychiatric:         Mood and Affect: Mood normal          Vital Signs  ED Triage Vitals [09/12/21 1700]   Temperature Pulse Respirations Blood Pressure SpO2   98 3 °F (36 8 °C) 90 18 (!) 214/93 99 %      Temp src Heart Rate Source Patient Position - Orthostatic VS BP Location FiO2 (%)   -- Monitor -- Right arm --      Pain Score       6           Vitals:    09/12/21 1700   BP: (!) 214/93   Pulse: 90         Visual Acuity      ED Medications  Medications   lidocaine-epinephrine (XYLOCAINE/EPINEPHRINE) 1 %-1:100,000 injection 1 mL (1 mL Infiltration Given 9/12/21 1745)       Diagnostic Studies  Results Reviewed     None                 No orders to display              Procedures  Procedures         ED Course  ED Course as of Sep 13 1513   Sun Sep 12, 2021   1801 Procedure: I&D right calf  1% lido with epi  Stab incision #11 blade  No pus at all  Dressing applied      1836 Pt is now declining a sono, saying that she does not believe it could be a clot, despite me explaining the rationale for ruling out dvt  She declines lovenox and dclines to come back tomorrow for a sono then  Understands risks death, disability, pain and suffering   I will not force an AMA, but she understands she is declining workup and treatment  SBIRT 20yo+      Most Recent Value   SBIRT (22 yo +)   In order to provide better care to our patients, we are screening all of our patients for alcohol and drug use  Would it be okay to ask you these screening questions? Yes Filed at: 09/12/2021 1720   Initial Alcohol Screen: US AUDIT-C    1  How often do you have a drink containing alcohol?  0 Filed at: 09/12/2021 1720   2  How many drinks containing alcohol do you have on a typical day you are drinking? 0 Filed at: 09/12/2021 1720   3a  Male UNDER 65: How often do you have five or more drinks on one occasion? 0 Filed at: 09/12/2021 1720   3b  FEMALE Any Age, or MALE 65+: How often do you have 4 or more drinks on one occassion? 0 Filed at: 09/12/2021 1720   Audit-C Score  0 Filed at: 09/12/2021 1720   PAVEL: How many times in the past year have you    Used an illegal drug or used a prescription medication for non-medical reasons? Never Filed at: 09/12/2021 1720                    MDM  Number of Diagnoses or Management Options  Cellulitis, leg  Leg swelling  Diagnosis management comments: See ed course notes  Leg edema, resolving cellulitis, I&D negative for pus, would not stay for sono, would not allow schedule of f/u sono  Disposition  Final diagnoses:   Cellulitis, leg   Leg swelling     Time reflects when diagnosis was documented in both MDM as applicable and the Disposition within this note     Time User Action Codes Description Comment    9/12/2021  6:37 PM Ness Morales Add [Z67 361] Cellulitis, leg     9/12/2021  6:37 PM Ness Morales Add [M79 [id number]] Leg swelling       ED Disposition     ED Disposition Condition Date/Time Comment    Discharge Stable Sun Sep 12, 2021  6:37 PM Thomas Aguila discharge to home/self care              Follow-up Information     Follow up With Specialties Details Why Contact Info    Art Ortega MD [facility] Schedule an appointment as soon as possible for a visit in 1 day  [address] [place] [address]  [phone]            Discharge Medication List as of 9/12/2021  6:39 PM      CONTINUE these medications which have NOT CHANGED    Details   ALPRAZolam (XANAX) 0 5 mg tablet "ONE-HALF" TABLET TWO TIMES A DAY, Normal      aluminum-magnesium hydroxide-simethicone (MAALOX) 200-200-20 MG/5ML SUSP Take 20 mL by mouth 4 (four) times a day (before meals and at bedtime), Starting Sat 5/29/2021, Normal      amLODIPine (NORVASC) 10 mg tablet Take 10 mg by mouth daily, Historical Med      atorvastatin (LIPITOR) 40 mg tablet Take 1 tablet (40 mg total) by mouth daily, Starting Thu 1/14/2021, Normal      bacitracin topical ointment 500 units/g topical ointment Apply to bilateral nares twice per day , Normal      Blood Pressure Monitoring (Sphygmomanometer) MISC Use 2 (two) times a day, Starting Fri 4/30/2021, Normal      clindamycin (CLEOCIN) 300 MG capsule Take 1 capsule (300 mg total) by mouth 4 (four) times a day for 7 days, Starting Tue 9/7/2021, Until Tue 9/14/2021, Normal      cyclobenzaprine (FLEXERIL) 10 mg tablet Take 1 tablet (10 mg total) by mouth 3 (three) times a day as needed for muscle spasms for up to 10 days, Starting Tue 5/5/2020, Until Thu 7/27/2028 at 2359, Normal      dexamethasone (DECADRON) 1 mg tablet Take 1 tablet (1 mg total) by mouth 2 (two) times a day with meals Take tablet between 10-11pm and then have lab next day in the morning 7-9am , Starting Thu 12/3/2020, Normal      dicyclomine (BENTYL) 20 mg tablet Take 20 mg by mouth every 6 (six) hours, Historical Med      Dulaglutide (Trulicity) 3 UP/8 0HT SOPN Inject 0 5 mL (3 mg total) under the skin once a week, Starting Thu 5/6/2021, Normal      Durezol 0 05 % EMUL INSTILL 1 DROP INTO RIGHT EYE 4 TIMES A DAY, Historical Med      ergocalciferol (VITAMIN D2) 50,000 units Take 1 capsule (50,000 Units total) by mouth 2 (two) times a week, Starting Thu 5/28/2020, Normal fenofibrate (TRICOR) 145 mg tablet TAKE ONE TABLET EVERY DAY, Normal      ferrous gluconate ([employer] Iron) 240 (27 FE) MG tablet Take 1 tablet (240 mg total) by mouth 3 (three) times a day with meals, Starting Tue 9/7/2021, Normal      fluticasone-umeclidinium-vilanterol (Trelegy Ellipta) 100-62 5-25 MCG/INH inhaler Inhale 1 puff daily Rinse mouth after use , Starting Mon 5/17/2021, Normal      hydrALAZINE (APRESOLINE) 50 mg tablet Take 2 tablets (100 mg total) by mouth 3 (three) times a day, Starting Wed 7/7/2021, Normal      hydrochlorothiazide (HYDRODIURIL) 25 mg tablet TAKE ONE TABLET EVERY DAY, Normal      Insulin Glargine (BASAGLAR KWIKPEN SC) Inject 90 Units under the skin, Historical Med      Insulin Pen Needle (UNIFINE PENTIPS PLUS) 31G X 6 MM MISC 1 Device by Does not apply route 4 (four) times a day, Starting Mon 6/5/2017, Historical Med      Insulin Regular Human, Conc, (HumuLIN R U-500 KwikPen) 500 units/mL CONCENTRATED U-500 injection pen Inject 40 Units under the skin 3 (three) times a day before meals, Starting Fri 4/16/2021, Normal      Lancets MISC 1 Device by Does not apply route 4 (four) times a day, Historical Med      lisinopril (ZESTRIL) 40 mg tablet TAKE ONE TABLET TWO TIMES A DAY, Normal      Magnesium 400 MG TABS Take by mouth, Historical Med      meclizine (ANTIVERT) 25 mg tablet Take 1 tablet (25 mg total) by mouth every 8 (eight) hours as needed for dizziness, Starting Thu 9/2/2021, Print      metoprolol succinate (TOPROL-XL) 200 MG 24 hr tablet TAKE 1 TABLET EVERY DAY BY ORAL ROUTE, Normal      mupirocin (BACTROBAN) 2 % ointment Apply topically 3 (three) times a day, Starting Thu 8/26/2021, Normal      naproxen (NAPROSYN) 500 mg tablet Take 1 tablet (500 mg total) by mouth 2 (two) times a day with meals, Starting Thu 6/10/2021, Normal      omeprazole (PriLOSEC) 40 MG capsule Take 1 capsule (40 mg total) by mouth daily, Starting Tue 5/18/2021, Normal      ondansetron (ZOFRAN-ODT) 4 mg disintegrating tablet Take 1 tablet (4 mg total) by mouth every 6 (six) hours as needed for nausea or vomiting, Starting Sat 5/29/2021, Normal      pioglitazone (ACTOS) 30 mg tablet Take 1 tablet (30 mg total) by mouth daily, Starting Thu 12/3/2020, Normal      pregabalin (LYRICA) 75 mg capsule TAKE ONE CAPSULE EVERY DAY, Normal      spironolactone (ALDACTONE) 25 mg tablet Take 25 mg by mouth daily , Historical Med      zolpidem (AMBIEN) 10 mg tablet TAKE 1 TABLET (10 MG TOTAL) BY MOUTH DAILY AT BEDTIME AS NEEDED FOR SLEEP, Starting Fri 1/29/2021, Normal           No discharge procedures on file      PDMP Review       Value Time User    PDMP Reviewed  Yes 3/8/2021  9:59 PM July Ruiz DO          ED Provider  Electronically Signed by           Jacqueline Pryor MD  09/13/21 [id number]

## 2021-09-12 NOTE — DISCHARGE INSTRUCTIONS
You are leaving without having ruled out a DVT, which could be life threatening, risk of death, disability, long term pain and suffering  If you change your mind or become worse in any way, return to the ER

## 2021-09-28 ENCOUNTER — HOSPITAL ENCOUNTER (EMERGENCY)
Facility: HOSPITAL | Age: 56
Discharge: HOME | End: 2021-09-28
Attending: EMERGENCY MEDICINE
Payer: MEDICARE

## 2021-09-28 VITALS
WEIGHT: 204 LBS | RESPIRATION RATE: 17 BRPM | TEMPERATURE: 98 F | HEART RATE: 90 BPM | OXYGEN SATURATION: 98 % | DIASTOLIC BLOOD PRESSURE: 81 MMHG | SYSTOLIC BLOOD PRESSURE: 165 MMHG | BODY MASS INDEX: 30.13 KG/M2

## 2021-09-28 DIAGNOSIS — N39.0 UTI (URINARY TRACT INFECTION): Primary | ICD-10-CM

## 2021-09-28 DIAGNOSIS — R51.9 HEADACHE: ICD-10-CM

## 2021-09-28 DIAGNOSIS — G89.29 CHRONIC LOW BACK PAIN: ICD-10-CM

## 2021-09-28 DIAGNOSIS — M54.50 CHRONIC LOW BACK PAIN: ICD-10-CM

## 2021-09-28 LAB
BACTERIA UR QL AUTO: ABNORMAL /HPF
BILIRUB UR QL STRIP: NEGATIVE
CLARITY UR: ABNORMAL
COLOR UR: YELLOW
GLUCOSE UR STRIP-MCNC: NEGATIVE MG/DL
HGB UR QL STRIP.AUTO: NEGATIVE
KETONES UR STRIP-MCNC: NEGATIVE MG/DL
LEUKOCYTE ESTERASE UR QL STRIP: ABNORMAL
NITRITE UR QL STRIP: POSITIVE
NON-SQ EPI CELLS URNS QL MICRO: ABNORMAL /HPF
PH UR STRIP.AUTO: 6 [PH]
PROT UR STRIP-MCNC: ABNORMAL MG/DL
RBC #/AREA URNS AUTO: ABNORMAL /HPF
SP GR UR STRIP.AUTO: 1.02 (ref 1–1.03)
UROBILINOGEN UR QL STRIP.AUTO: 1 E.U./DL
WBC #/AREA URNS AUTO: ABNORMAL /HPF

## 2021-09-28 PROCEDURE — 99284 EMERGENCY DEPT VISIT MOD MDM: CPT | Performed by: EMERGENCY MEDICINE

## 2021-09-28 PROCEDURE — 87186 SC STD MICRODIL/AGAR DIL: CPT

## 2021-09-28 PROCEDURE — 87086 URINE CULTURE/COLONY COUNT: CPT

## 2021-09-28 PROCEDURE — 81001 URINALYSIS AUTO W/SCOPE: CPT

## 2021-09-28 PROCEDURE — 87077 CULTURE AEROBIC IDENTIFY: CPT

## 2021-09-28 PROCEDURE — 99283 EMERGENCY DEPT VISIT LOW MDM: CPT

## 2021-09-28 PROCEDURE — 96372 THER/PROPH/DIAG INJ SC/IM: CPT

## 2021-09-28 RX ORDER — KETOROLAC TROMETHAMINE 30 MG/ML
30 INJECTION, SOLUTION INTRAMUSCULAR; INTRAVENOUS ONCE
Status: COMPLETED | OUTPATIENT
Start: 2021-09-28 | End: 2021-09-28

## 2021-09-28 RX ORDER — CEPHALEXIN 250 MG/1
500 CAPSULE ORAL ONCE
Status: COMPLETED | OUTPATIENT
Start: 2021-09-28 | End: 2021-09-28

## 2021-09-28 RX ORDER — CEPHALEXIN 500 MG/1
500 CAPSULE ORAL EVERY 6 HOURS SCHEDULED
Qty: 28 CAPSULE | Refills: 0 | Status: SHIPPED | OUTPATIENT
Start: 2021-09-28 | End: 2021-10-05

## 2021-09-28 RX ADMIN — KETOROLAC TROMETHAMINE 30 MG: 30 INJECTION, SOLUTION INTRAMUSCULAR at 16:20

## 2021-09-28 RX ADMIN — CEPHALEXIN 500 MG: 250 CAPSULE ORAL at 16:51

## 2021-09-30 DIAGNOSIS — E61.1 IRON DEFICIENCY: ICD-10-CM

## 2021-10-01 LAB
BACTERIA UR CULT: ABNORMAL
BACTERIA UR CULT: ABNORMAL

## 2021-10-01 RX ORDER — DIAPER,BRIEF,INFANT-TODD,DISP
EACH MISCELLANEOUS
Qty: 90 TABLET | Refills: 1 | Status: SHIPPED | OUTPATIENT
Start: 2021-10-01 | End: 2021-11-23 | Stop reason: HOSPADM

## 2021-10-08 DIAGNOSIS — E11.65 TYPE 2 DIABETES MELLITUS WITH HYPERGLYCEMIA, WITH LONG-TERM CURRENT USE OF INSULIN (HCC): ICD-10-CM

## 2021-10-08 DIAGNOSIS — Z79.4 TYPE 2 DIABETES MELLITUS WITH HYPERGLYCEMIA, WITH LONG-TERM CURRENT USE OF INSULIN (HCC): ICD-10-CM

## 2021-10-08 RX ORDER — ATORVASTATIN CALCIUM 40 MG/1
TABLET, FILM COATED ORAL
Qty: 90 TABLET | Refills: 2 | Status: ON HOLD | OUTPATIENT
Start: 2021-10-08 | End: 2022-03-03 | Stop reason: SDUPTHER

## 2021-10-12 ENCOUNTER — TELEPHONE (OUTPATIENT)
Dept: GASTROENTEROLOGY | Facility: AMBULARY SURGERY CENTER | Age: 56
End: 2021-10-12

## 2021-10-22 ENCOUNTER — APPOINTMENT (EMERGENCY)
Dept: RADIOLOGY | Facility: HOSPITAL | Age: 56
End: 2021-10-22
Payer: MEDICARE

## 2021-10-22 ENCOUNTER — HOSPITAL ENCOUNTER (EMERGENCY)
Facility: HOSPITAL | Age: 56
End: 2021-10-22
Attending: EMERGENCY MEDICINE
Payer: MEDICARE

## 2021-10-22 ENCOUNTER — APPOINTMENT (INPATIENT)
Dept: RADIOLOGY | Facility: HOSPITAL | Age: 56
DRG: 003 | End: 2021-10-22
Payer: MEDICARE

## 2021-10-22 ENCOUNTER — HOSPITAL ENCOUNTER (INPATIENT)
Facility: HOSPITAL | Age: 56
LOS: 32 days | Discharge: NURSING, REHAB OR LONG-TERM CARE | DRG: 003 | End: 2021-11-23
Attending: INTERNAL MEDICINE | Admitting: INTERNAL MEDICINE
Payer: MEDICARE

## 2021-10-22 VITALS
TEMPERATURE: 97.5 F | HEART RATE: 95 BPM | OXYGEN SATURATION: 94 % | DIASTOLIC BLOOD PRESSURE: 93 MMHG | SYSTOLIC BLOOD PRESSURE: 159 MMHG | RESPIRATION RATE: 22 BRPM

## 2021-10-22 DIAGNOSIS — I21.3 STEMI (ST ELEVATION MYOCARDIAL INFARCTION) (HCC): Primary | ICD-10-CM

## 2021-10-22 DIAGNOSIS — I48.91 ATRIAL FIBRILLATION, UNSPECIFIED TYPE (HCC): ICD-10-CM

## 2021-10-22 DIAGNOSIS — I46.9 CARDIAC ARREST (HCC): ICD-10-CM

## 2021-10-22 DIAGNOSIS — J96.01 ACUTE HYPOXEMIC RESPIRATORY FAILURE (HCC): ICD-10-CM

## 2021-10-22 DIAGNOSIS — IMO0002 UNCONTROLLED TYPE 2 DIABETES MELLITUS WITH COMPLICATION, WITH LONG-TERM CURRENT USE OF INSULIN: ICD-10-CM

## 2021-10-22 DIAGNOSIS — R57.0 CARDIOGENIC SHOCK (HCC): ICD-10-CM

## 2021-10-22 DIAGNOSIS — K21.00 GASTROESOPHAGEAL REFLUX DISEASE WITH ESOPHAGITIS: ICD-10-CM

## 2021-10-22 DIAGNOSIS — N17.9 AKI (ACUTE KIDNEY INJURY) (HCC): ICD-10-CM

## 2021-10-22 DIAGNOSIS — I95.9 HYPOTENSION: ICD-10-CM

## 2021-10-22 DIAGNOSIS — I63.9 CEREBRAL VASCULAR ACCIDENT (HCC): ICD-10-CM

## 2021-10-22 DIAGNOSIS — I10 UNCONTROLLED HYPERTENSION: ICD-10-CM

## 2021-10-22 DIAGNOSIS — I48.91 A-FIB (HCC): ICD-10-CM

## 2021-10-22 DIAGNOSIS — I63.89 CEREBROVASCULAR ACCIDENT (CVA) DUE TO OTHER MECHANISM (HCC): ICD-10-CM

## 2021-10-22 DIAGNOSIS — R19.4 CHANGE IN BOWEL HABIT: ICD-10-CM

## 2021-10-22 DIAGNOSIS — I47.2 VENTRICULAR TACHYCARDIA (HCC): ICD-10-CM

## 2021-10-22 DIAGNOSIS — G89.4 CHRONIC PAIN DISORDER: ICD-10-CM

## 2021-10-22 DIAGNOSIS — A41.9 SEPSIS (HCC): ICD-10-CM

## 2021-10-22 DIAGNOSIS — I21.02 ST ELEVATION MYOCARDIAL INFARCTION INVOLVING LEFT ANTERIOR DESCENDING (LAD) CORONARY ARTERY (HCC): ICD-10-CM

## 2021-10-22 LAB
ALBUMIN SERPL BCP-MCNC: 1.9 G/DL (ref 3.5–5)
ALBUMIN SERPL BCP-MCNC: 2.9 G/DL (ref 3.5–5)
ALP SERPL-CCNC: 94 U/L (ref 46–116)
ALP SERPL-CCNC: 97 U/L (ref 46–116)
ALT SERPL W P-5'-P-CCNC: 89 U/L (ref 12–78)
ALT SERPL W P-5'-P-CCNC: 98 U/L (ref 12–78)
ANION GAP SERPL CALCULATED.3IONS-SCNC: 10 MMOL/L (ref 4–13)
ANION GAP SERPL CALCULATED.3IONS-SCNC: 5 MMOL/L (ref 4–13)
APTT PPP: 30 SECONDS (ref 23–37)
AST SERPL W P-5'-P-CCNC: 610 U/L (ref 5–45)
AST SERPL W P-5'-P-CCNC: 651 U/L (ref 5–45)
BASE EX.OXY STD BLDV CALC-SCNC: 56.5 % (ref 60–80)
BASE EXCESS BLDA CALC-SCNC: -2.9 MMOL/L
BASE EXCESS BLDV CALC-SCNC: -1 MMOL/L
BASOPHILS # BLD AUTO: 0.02 THOUSANDS/ΜL (ref 0–0.1)
BASOPHILS # BLD AUTO: 0.03 THOUSANDS/ΜL (ref 0–0.1)
BASOPHILS NFR BLD AUTO: 0 % (ref 0–1)
BASOPHILS NFR BLD AUTO: 0 % (ref 0–1)
BILIRUB DIRECT SERPL-MCNC: 0.52 MG/DL (ref 0–0.2)
BILIRUB SERPL-MCNC: 0.59 MG/DL (ref 0.2–1)
BILIRUB SERPL-MCNC: 1.08 MG/DL (ref 0.2–1)
BUN SERPL-MCNC: 12 MG/DL (ref 5–25)
BUN SERPL-MCNC: 12 MG/DL (ref 5–25)
CALCIUM ALBUM COR SERPL-MCNC: 9.6 MG/DL (ref 8.3–10.1)
CALCIUM SERPL-MCNC: 7.4 MG/DL (ref 8.3–10.1)
CALCIUM SERPL-MCNC: 8.7 MG/DL (ref 8.3–10.1)
CHLORIDE SERPL-SCNC: 104 MMOL/L (ref 100–108)
CHLORIDE SERPL-SCNC: 110 MMOL/L (ref 100–108)
CO2 SERPL-SCNC: 24 MMOL/L (ref 21–32)
CO2 SERPL-SCNC: 27 MMOL/L (ref 21–32)
CREAT SERPL-MCNC: 0.84 MG/DL (ref 0.6–1.3)
CREAT SERPL-MCNC: 0.86 MG/DL (ref 0.6–1.3)
D DIMER PPP FEU-MCNC: 0.55 UG/ML FEU
EOSINOPHIL # BLD AUTO: 0.02 THOUSAND/ΜL (ref 0–0.61)
EOSINOPHIL # BLD AUTO: 0.02 THOUSAND/ΜL (ref 0–0.61)
EOSINOPHIL NFR BLD AUTO: 0 % (ref 0–6)
EOSINOPHIL NFR BLD AUTO: 0 % (ref 0–6)
ERYTHROCYTE [DISTWIDTH] IN BLOOD BY AUTOMATED COUNT: 15 % (ref 11.6–15.1)
ERYTHROCYTE [DISTWIDTH] IN BLOOD BY AUTOMATED COUNT: 15.4 % (ref 11.6–15.1)
FLUAV RNA RESP QL NAA+PROBE: NEGATIVE
FLUBV RNA RESP QL NAA+PROBE: NEGATIVE
GFR SERPL CREATININE-BSD FRML MDRD: 76 ML/MIN/1.73SQ M
GFR SERPL CREATININE-BSD FRML MDRD: 78 ML/MIN/1.73SQ M
GLUCOSE SERPL-MCNC: 236 MG/DL (ref 65–140)
GLUCOSE SERPL-MCNC: 297 MG/DL (ref 65–140)
GLUCOSE SERPL-MCNC: 303 MG/DL (ref 65–140)
HCO3 BLDA-SCNC: 18.1 MMOL/L (ref 22–28)
HCO3 BLDV-SCNC: 23.7 MMOL/L (ref 24–30)
HCT VFR BLD AUTO: 31.8 % (ref 34.8–46.1)
HCT VFR BLD AUTO: 38.8 % (ref 34.8–46.1)
HGB BLD-MCNC: 11.9 G/DL (ref 11.5–15.4)
HGB BLD-MCNC: 9.7 G/DL (ref 11.5–15.4)
IMM GRANULOCYTES # BLD AUTO: 0.06 THOUSAND/UL (ref 0–0.2)
IMM GRANULOCYTES # BLD AUTO: 0.1 THOUSAND/UL (ref 0–0.2)
IMM GRANULOCYTES NFR BLD AUTO: 1 % (ref 0–2)
IMM GRANULOCYTES NFR BLD AUTO: 1 % (ref 0–2)
INR PPP: 0.99 (ref 0.84–1.19)
KCT BLD-ACNC: 237 SEC (ref 89–137)
LACTATE SERPL-SCNC: 2.1 MMOL/L (ref 0.5–2)
LACTATE SERPL-SCNC: 3.4 MMOL/L (ref 0.5–2)
LIPASE SERPL-CCNC: 30 U/L (ref 73–393)
LYMPHOCYTES # BLD AUTO: 1.48 THOUSANDS/ΜL (ref 0.6–4.47)
LYMPHOCYTES # BLD AUTO: 1.66 THOUSANDS/ΜL (ref 0.6–4.47)
LYMPHOCYTES NFR BLD AUTO: 12 % (ref 14–44)
LYMPHOCYTES NFR BLD AUTO: 13 % (ref 14–44)
MAGNESIUM SERPL-MCNC: 1.9 MG/DL (ref 1.6–2.6)
MCH RBC QN AUTO: 27.2 PG (ref 26.8–34.3)
MCH RBC QN AUTO: 27.6 PG (ref 26.8–34.3)
MCHC RBC AUTO-ENTMCNC: 30.5 G/DL (ref 31.4–37.4)
MCHC RBC AUTO-ENTMCNC: 30.7 G/DL (ref 31.4–37.4)
MCV RBC AUTO: 89 FL (ref 82–98)
MCV RBC AUTO: 90 FL (ref 82–98)
MONOCYTES # BLD AUTO: 1.08 THOUSAND/ΜL (ref 0.17–1.22)
MONOCYTES # BLD AUTO: 1.16 THOUSAND/ΜL (ref 0.17–1.22)
MONOCYTES NFR BLD AUTO: 9 % (ref 4–12)
MONOCYTES NFR BLD AUTO: 9 % (ref 4–12)
NASAL CANNULA: 2
NEUTROPHILS # BLD AUTO: 10.32 THOUSANDS/ΜL (ref 1.85–7.62)
NEUTROPHILS # BLD AUTO: 9.81 THOUSANDS/ΜL (ref 1.85–7.62)
NEUTS SEG NFR BLD AUTO: 77 % (ref 43–75)
NEUTS SEG NFR BLD AUTO: 78 % (ref 43–75)
NRBC BLD AUTO-RTO: 0 /100 WBCS
NRBC BLD AUTO-RTO: 0 /100 WBCS
NT-PROBNP SERPL-MCNC: 3158 PG/ML
O2 CT BLDA-SCNC: 15.6 ML/DL (ref 16–23)
O2 CT BLDV-SCNC: 9 ML/DL
OXYHGB MFR BLDA: 95.8 % (ref 94–97)
PCO2 BLDA: 21.9 MM HG (ref 36–44)
PCO2 BLDV: 39.3 MM HG (ref 42–50)
PH BLDA: 7.53 [PH] (ref 7.35–7.45)
PH BLDV: 7.4 [PH] (ref 7.3–7.4)
PLATELET # BLD AUTO: 202 THOUSANDS/UL (ref 149–390)
PLATELET # BLD AUTO: 263 THOUSANDS/UL (ref 149–390)
PMV BLD AUTO: 10.6 FL (ref 8.9–12.7)
PMV BLD AUTO: 10.8 FL (ref 8.9–12.7)
PO2 BLDA: 96 MM HG (ref 75–129)
PO2 BLDV: 32.3 MM HG (ref 35–45)
POTASSIUM SERPL-SCNC: 3.3 MMOL/L (ref 3.5–5.3)
POTASSIUM SERPL-SCNC: 3.4 MMOL/L (ref 3.5–5.3)
PROT SERPL-MCNC: 5.3 G/DL (ref 6.4–8.2)
PROT SERPL-MCNC: 6.9 G/DL (ref 6.4–8.2)
PROTHROMBIN TIME: 12.9 SECONDS (ref 11.6–14.5)
RBC # BLD AUTO: 3.52 MILLION/UL (ref 3.81–5.12)
RBC # BLD AUTO: 4.37 MILLION/UL (ref 3.81–5.12)
RSV RNA RESP QL NAA+PROBE: NEGATIVE
SARS-COV-2 RNA RESP QL NAA+PROBE: NEGATIVE
SODIUM SERPL-SCNC: 139 MMOL/L (ref 136–145)
SODIUM SERPL-SCNC: 141 MMOL/L (ref 136–145)
SPECIMEN SOURCE: ABNORMAL
SPECIMEN SOURCE: ABNORMAL
TROPONIN I SERPL-MCNC: >40 NG/ML
TROPONIN I SERPL-MCNC: >40 NG/ML
WBC # BLD AUTO: 12.47 THOUSAND/UL (ref 4.31–10.16)
WBC # BLD AUTO: 13.29 THOUSAND/UL (ref 4.31–10.16)

## 2021-10-22 PROCEDURE — 85025 COMPLETE CBC W/AUTO DIFF WBC: CPT | Performed by: EMERGENCY MEDICINE

## 2021-10-22 PROCEDURE — 027034Z DILATION OF CORONARY ARTERY, ONE ARTERY WITH DRUG-ELUTING INTRALUMINAL DEVICE, PERCUTANEOUS APPROACH: ICD-10-PCS | Performed by: INTERNAL MEDICINE

## 2021-10-22 PROCEDURE — NC001 PR NO CHARGE: Performed by: INTERNAL MEDICINE

## 2021-10-22 PROCEDURE — B2111ZZ FLUOROSCOPY OF MULTIPLE CORONARY ARTERIES USING LOW OSMOLAR CONTRAST: ICD-10-PCS | Performed by: INTERNAL MEDICINE

## 2021-10-22 PROCEDURE — 93005 ELECTROCARDIOGRAM TRACING: CPT

## 2021-10-22 PROCEDURE — 0241U HB NFCT DS VIR RESP RNA 4 TRGT: CPT | Performed by: EMERGENCY MEDICINE

## 2021-10-22 PROCEDURE — C1769 GUIDE WIRE: HCPCS | Performed by: INTERNAL MEDICINE

## 2021-10-22 PROCEDURE — 93454 CORONARY ARTERY ANGIO S&I: CPT | Performed by: INTERNAL MEDICINE

## 2021-10-22 PROCEDURE — 76937 US GUIDE VASCULAR ACCESS: CPT | Performed by: INTERNAL MEDICINE

## 2021-10-22 PROCEDURE — 92941 PRQ TRLML REVSC TOT OCCL AMI: CPT | Performed by: INTERNAL MEDICINE

## 2021-10-22 PROCEDURE — 85730 THROMBOPLASTIN TIME PARTIAL: CPT | Performed by: EMERGENCY MEDICINE

## 2021-10-22 PROCEDURE — C1874 STENT, COATED/COV W/DEL SYS: HCPCS | Performed by: INTERNAL MEDICINE

## 2021-10-22 PROCEDURE — 36600 WITHDRAWAL OF ARTERIAL BLOOD: CPT

## 2021-10-22 PROCEDURE — C1757 CATH, THROMBECTOMY/EMBOLECT: HCPCS | Performed by: INTERNAL MEDICINE

## 2021-10-22 PROCEDURE — 02C03ZZ EXTIRPATION OF MATTER FROM CORONARY ARTERY, ONE ARTERY, PERCUTANEOUS APPROACH: ICD-10-PCS | Performed by: INTERNAL MEDICINE

## 2021-10-22 PROCEDURE — 96375 TX/PRO/DX INJ NEW DRUG ADDON: CPT

## 2021-10-22 PROCEDURE — 92973 PRQ TRLUML C MCHN ASP THRMBC: CPT | Performed by: INTERNAL MEDICINE

## 2021-10-22 PROCEDURE — 36415 COLL VENOUS BLD VENIPUNCTURE: CPT | Performed by: EMERGENCY MEDICINE

## 2021-10-22 PROCEDURE — 84484 ASSAY OF TROPONIN QUANT: CPT | Performed by: EMERGENCY MEDICINE

## 2021-10-22 PROCEDURE — 4A023N7 MEASUREMENT OF CARDIAC SAMPLING AND PRESSURE, LEFT HEART, PERCUTANEOUS APPROACH: ICD-10-PCS | Performed by: INTERNAL MEDICINE

## 2021-10-22 PROCEDURE — 71045 X-RAY EXAM CHEST 1 VIEW: CPT

## 2021-10-22 PROCEDURE — C1725 CATH, TRANSLUMIN NON-LASER: HCPCS | Performed by: INTERNAL MEDICINE

## 2021-10-22 PROCEDURE — 36556 INSERT NON-TUNNEL CV CATH: CPT | Performed by: INTERNAL MEDICINE

## 2021-10-22 PROCEDURE — C1887 CATHETER, GUIDING: HCPCS | Performed by: INTERNAL MEDICINE

## 2021-10-22 PROCEDURE — 85379 FIBRIN DEGRADATION QUANT: CPT | Performed by: EMERGENCY MEDICINE

## 2021-10-22 PROCEDURE — 85610 PROTHROMBIN TIME: CPT | Performed by: EMERGENCY MEDICINE

## 2021-10-22 PROCEDURE — 80048 BASIC METABOLIC PNL TOTAL CA: CPT | Performed by: EMERGENCY MEDICINE

## 2021-10-22 PROCEDURE — 4A133B1 MONITORING OF ARTERIAL PRESSURE, PERIPHERAL, PERCUTANEOUS APPROACH: ICD-10-PCS | Performed by: INTERNAL MEDICINE

## 2021-10-22 PROCEDURE — 4A133J1 MONITORING OF ARTERIAL PULSE, PERIPHERAL, PERCUTANEOUS APPROACH: ICD-10-PCS | Performed by: INTERNAL MEDICINE

## 2021-10-22 PROCEDURE — 84484 ASSAY OF TROPONIN QUANT: CPT | Performed by: STUDENT IN AN ORGANIZED HEALTH CARE EDUCATION/TRAINING PROGRAM

## 2021-10-22 PROCEDURE — C9113 INJ PANTOPRAZOLE SODIUM, VIA: HCPCS | Performed by: INTERNAL MEDICINE

## 2021-10-22 PROCEDURE — 83690 ASSAY OF LIPASE: CPT | Performed by: EMERGENCY MEDICINE

## 2021-10-22 PROCEDURE — 03HY32Z INSERTION OF MONITORING DEVICE INTO UPPER ARTERY, PERCUTANEOUS APPROACH: ICD-10-PCS | Performed by: INTERNAL MEDICINE

## 2021-10-22 PROCEDURE — 85347 COAGULATION TIME ACTIVATED: CPT

## 2021-10-22 PROCEDURE — 82805 BLOOD GASES W/O2 SATURATION: CPT

## 2021-10-22 PROCEDURE — 94640 AIRWAY INHALATION TREATMENT: CPT

## 2021-10-22 PROCEDURE — 99152 MOD SED SAME PHYS/QHP 5/>YRS: CPT | Performed by: INTERNAL MEDICINE

## 2021-10-22 PROCEDURE — 02713ZZ DILATION OF CORONARY ARTERY, TWO ARTERIES, PERCUTANEOUS APPROACH: ICD-10-PCS | Performed by: INTERNAL MEDICINE

## 2021-10-22 PROCEDURE — 83735 ASSAY OF MAGNESIUM: CPT | Performed by: EMERGENCY MEDICINE

## 2021-10-22 PROCEDURE — 80053 COMPREHEN METABOLIC PANEL: CPT | Performed by: EMERGENCY MEDICINE

## 2021-10-22 PROCEDURE — 83880 ASSAY OF NATRIURETIC PEPTIDE: CPT | Performed by: EMERGENCY MEDICINE

## 2021-10-22 PROCEDURE — G1004 CDSM NDSC: HCPCS

## 2021-10-22 PROCEDURE — 83605 ASSAY OF LACTIC ACID: CPT

## 2021-10-22 PROCEDURE — 02HV33Z INSERTION OF INFUSION DEVICE INTO SUPERIOR VENA CAVA, PERCUTANEOUS APPROACH: ICD-10-PCS | Performed by: INTERNAL MEDICINE

## 2021-10-22 PROCEDURE — 74174 CTA ABD&PLVS W/CONTRAST: CPT

## 2021-10-22 PROCEDURE — 82948 REAGENT STRIP/BLOOD GLUCOSE: CPT

## 2021-10-22 PROCEDURE — C1894 INTRO/SHEATH, NON-LASER: HCPCS | Performed by: INTERNAL MEDICINE

## 2021-10-22 PROCEDURE — 80076 HEPATIC FUNCTION PANEL: CPT | Performed by: EMERGENCY MEDICINE

## 2021-10-22 PROCEDURE — 99153 MOD SED SAME PHYS/QHP EA: CPT | Performed by: INTERNAL MEDICINE

## 2021-10-22 PROCEDURE — 96374 THER/PROPH/DIAG INJ IV PUSH: CPT

## 2021-10-22 PROCEDURE — 71275 CT ANGIOGRAPHY CHEST: CPT

## 2021-10-22 PROCEDURE — 99291 CRITICAL CARE FIRST HOUR: CPT | Performed by: INTERNAL MEDICINE

## 2021-10-22 PROCEDURE — 99291 CRITICAL CARE FIRST HOUR: CPT | Performed by: EMERGENCY MEDICINE

## 2021-10-22 PROCEDURE — 99285 EMERGENCY DEPT VISIT HI MDM: CPT

## 2021-10-22 DEVICE — XIENCE SKYPOINT™ EVEROLIMUS ELUTING CORONARY STENT SYSTEM 2.50 MM X 28 MM / RAPID-EXCHANGE
Type: IMPLANTABLE DEVICE | Site: CORONARY | Status: FUNCTIONAL
Brand: XIENCE SKYPOINT™

## 2021-10-22 RX ORDER — NOREPINEPHRINE BITARTRATE 1 MG/ML
INJECTION, SOLUTION INTRAVENOUS
Status: DISPENSED
Start: 2021-10-22 | End: 2021-10-23

## 2021-10-22 RX ORDER — FENTANYL CITRATE 50 UG/ML
INJECTION, SOLUTION INTRAMUSCULAR; INTRAVENOUS AS NEEDED
Status: DISCONTINUED | OUTPATIENT
Start: 2021-10-22 | End: 2021-10-22 | Stop reason: HOSPADM

## 2021-10-22 RX ORDER — ACETAMINOPHEN 325 MG/1
650 TABLET ORAL EVERY 4 HOURS PRN
Status: DISCONTINUED | OUTPATIENT
Start: 2021-10-22 | End: 2021-11-01

## 2021-10-22 RX ORDER — IPRATROPIUM BROMIDE AND ALBUTEROL SULFATE 2.5; .5 MG/3ML; MG/3ML
3 SOLUTION RESPIRATORY (INHALATION) ONCE
Status: DISCONTINUED | OUTPATIENT
Start: 2021-10-22 | End: 2021-10-22

## 2021-10-22 RX ORDER — ALPRAZOLAM 0.25 MG/1
0.25 TABLET ORAL 2 TIMES DAILY PRN
Status: DISCONTINUED | OUTPATIENT
Start: 2021-10-22 | End: 2021-11-02

## 2021-10-22 RX ORDER — MORPHINE SULFATE 4 MG/ML
4 INJECTION, SOLUTION INTRAMUSCULAR; INTRAVENOUS ONCE
Status: COMPLETED | OUTPATIENT
Start: 2021-10-22 | End: 2021-10-22

## 2021-10-22 RX ORDER — NITROGLYCERIN 20 MG/100ML
INJECTION INTRAVENOUS AS NEEDED
Status: DISCONTINUED | OUTPATIENT
Start: 2021-10-22 | End: 2021-10-22 | Stop reason: HOSPADM

## 2021-10-22 RX ORDER — ONDANSETRON 2 MG/ML
4 INJECTION INTRAMUSCULAR; INTRAVENOUS ONCE
Status: COMPLETED | OUTPATIENT
Start: 2021-10-22 | End: 2021-10-22

## 2021-10-22 RX ORDER — LIDOCAINE HYDROCHLORIDE 10 MG/ML
INJECTION, SOLUTION EPIDURAL; INFILTRATION; INTRACAUDAL; PERINEURAL
Status: DISPENSED
Start: 2021-10-22 | End: 2021-10-23

## 2021-10-22 RX ORDER — HEPARIN SODIUM 1000 [USP'U]/ML
4000 INJECTION, SOLUTION INTRAVENOUS; SUBCUTANEOUS ONCE
Status: COMPLETED | OUTPATIENT
Start: 2021-10-22 | End: 2021-10-22

## 2021-10-22 RX ORDER — INSULIN GLARGINE 100 [IU]/ML
20 INJECTION, SOLUTION SUBCUTANEOUS
Status: DISCONTINUED | OUTPATIENT
Start: 2021-10-22 | End: 2021-10-22

## 2021-10-22 RX ORDER — MILRINONE LACTATE 0.2 MG/ML
0.13 INJECTION, SOLUTION INTRAVENOUS CONTINUOUS
Status: DISCONTINUED | OUTPATIENT
Start: 2021-10-22 | End: 2021-10-29

## 2021-10-22 RX ORDER — METHYLPREDNISOLONE SODIUM SUCCINATE 125 MG/2ML
60 INJECTION, POWDER, LYOPHILIZED, FOR SOLUTION INTRAMUSCULAR; INTRAVENOUS ONCE
Status: DISCONTINUED | OUTPATIENT
Start: 2021-10-22 | End: 2021-10-22

## 2021-10-22 RX ORDER — SODIUM CHLORIDE 9 MG/ML
INJECTION, SOLUTION INTRAVENOUS
Status: COMPLETED | OUTPATIENT
Start: 2021-10-22 | End: 2021-10-22

## 2021-10-22 RX ORDER — SODIUM CHLORIDE 9 MG/ML
125 INJECTION, SOLUTION INTRAVENOUS CONTINUOUS
Status: DISCONTINUED | OUTPATIENT
Start: 2021-10-22 | End: 2021-10-22

## 2021-10-22 RX ORDER — MIDAZOLAM HYDROCHLORIDE 2 MG/2ML
INJECTION, SOLUTION INTRAMUSCULAR; INTRAVENOUS AS NEEDED
Status: DISCONTINUED | OUTPATIENT
Start: 2021-10-22 | End: 2021-10-22 | Stop reason: HOSPADM

## 2021-10-22 RX ORDER — NITROGLYCERIN 0.4 MG/1
0.4 TABLET SUBLINGUAL ONCE
Status: COMPLETED | OUTPATIENT
Start: 2021-10-22 | End: 2021-10-22

## 2021-10-22 RX ORDER — ATORVASTATIN CALCIUM 40 MG/1
40 TABLET, FILM COATED ORAL
Status: DISCONTINUED | OUTPATIENT
Start: 2021-10-22 | End: 2021-11-23 | Stop reason: HOSPADM

## 2021-10-22 RX ORDER — MAGNESIUM HYDROXIDE/ALUMINUM HYDROXICE/SIMETHICONE 120; 1200; 1200 MG/30ML; MG/30ML; MG/30ML
30 SUSPENSION ORAL ONCE
Status: DISCONTINUED | OUTPATIENT
Start: 2021-10-22 | End: 2021-10-22

## 2021-10-22 RX ORDER — DEXAMETHASONE 2 MG/1
1 TABLET ORAL 2 TIMES DAILY WITH MEALS
Status: DISCONTINUED | OUTPATIENT
Start: 2021-10-22 | End: 2021-10-23

## 2021-10-22 RX ORDER — INSULIN GLARGINE 100 [IU]/ML
15 INJECTION, SOLUTION SUBCUTANEOUS
Status: DISCONTINUED | OUTPATIENT
Start: 2021-10-22 | End: 2021-10-22

## 2021-10-22 RX ORDER — ASPIRIN 81 MG/1
324 TABLET, CHEWABLE ORAL ONCE
Status: COMPLETED | OUTPATIENT
Start: 2021-10-22 | End: 2021-10-22

## 2021-10-22 RX ORDER — DICYCLOMINE HCL 20 MG
20 TABLET ORAL EVERY 6 HOURS
Status: DISCONTINUED | OUTPATIENT
Start: 2021-10-22 | End: 2021-11-05

## 2021-10-22 RX ORDER — ONDANSETRON 2 MG/ML
4 INJECTION INTRAMUSCULAR; INTRAVENOUS EVERY 6 HOURS PRN
Status: DISCONTINUED | OUTPATIENT
Start: 2021-10-22 | End: 2021-11-23 | Stop reason: HOSPADM

## 2021-10-22 RX ORDER — VERAPAMIL HYDROCHLORIDE 2.5 MG/ML
INJECTION, SOLUTION INTRAVENOUS AS NEEDED
Status: DISCONTINUED | OUTPATIENT
Start: 2021-10-22 | End: 2021-10-22 | Stop reason: HOSPADM

## 2021-10-22 RX ORDER — FUROSEMIDE 10 MG/ML
20 INJECTION INTRAMUSCULAR; INTRAVENOUS ONCE
Status: COMPLETED | OUTPATIENT
Start: 2021-10-22 | End: 2021-10-22

## 2021-10-22 RX ORDER — LIDOCAINE HYDROCHLORIDE 10 MG/ML
INJECTION, SOLUTION EPIDURAL; INFILTRATION; INTRACAUDAL; PERINEURAL AS NEEDED
Status: DISCONTINUED | OUTPATIENT
Start: 2021-10-22 | End: 2021-10-22 | Stop reason: HOSPADM

## 2021-10-22 RX ORDER — ASPIRIN 81 MG/1
81 TABLET, CHEWABLE ORAL DAILY
Status: DISCONTINUED | OUTPATIENT
Start: 2021-10-23 | End: 2021-11-22

## 2021-10-22 RX ORDER — HEPARIN SODIUM 1000 [USP'U]/ML
INJECTION, SOLUTION INTRAVENOUS; SUBCUTANEOUS AS NEEDED
Status: DISCONTINUED | OUTPATIENT
Start: 2021-10-22 | End: 2021-10-22 | Stop reason: HOSPADM

## 2021-10-22 RX ORDER — PANTOPRAZOLE SODIUM 40 MG/1
40 TABLET, DELAYED RELEASE ORAL
Status: DISCONTINUED | OUTPATIENT
Start: 2021-10-23 | End: 2021-10-28

## 2021-10-22 RX ORDER — PANTOPRAZOLE SODIUM 40 MG/1
40 INJECTION, POWDER, FOR SOLUTION INTRAVENOUS ONCE
Status: COMPLETED | OUTPATIENT
Start: 2021-10-22 | End: 2021-10-22

## 2021-10-22 RX ADMIN — INSULIN LISPRO 4 UNITS: 100 INJECTION, SOLUTION INTRAVENOUS; SUBCUTANEOUS at 19:10

## 2021-10-22 RX ADMIN — TICAGRELOR 180 MG: 90 TABLET ORAL at 15:23

## 2021-10-22 RX ADMIN — SODIUM CHLORIDE 8 UNITS/HR: 9 INJECTION, SOLUTION INTRAVENOUS at 22:07

## 2021-10-22 RX ADMIN — HEPARIN SODIUM 4000 UNITS: 1000 INJECTION INTRAVENOUS; SUBCUTANEOUS at 15:15

## 2021-10-22 RX ADMIN — ASPIRIN 81 MG CHEWABLE TABLET 324 MG: 81 TABLET CHEWABLE at 14:53

## 2021-10-22 RX ADMIN — PANTOPRAZOLE SODIUM 40 MG: 40 INJECTION, POWDER, FOR SOLUTION INTRAVENOUS at 22:04

## 2021-10-22 RX ADMIN — ONDANSETRON 4 MG: 2 INJECTION INTRAMUSCULAR; INTRAVENOUS at 18:53

## 2021-10-22 RX ADMIN — NITROGLYCERIN 0.4 MG: 0.4 TABLET SUBLINGUAL at 14:56

## 2021-10-22 RX ADMIN — ONDANSETRON 4 MG: 2 INJECTION INTRAMUSCULAR; INTRAVENOUS at 14:54

## 2021-10-22 RX ADMIN — IODIXANOL 100 ML: 320 INJECTION, SOLUTION INTRAVASCULAR at 21:30

## 2021-10-22 RX ADMIN — SODIUM CHLORIDE 125 ML/HR: 0.9 INJECTION, SOLUTION INTRAVENOUS at 18:02

## 2021-10-22 RX ADMIN — MILRINONE LACTATE IN DEXTROSE 0.38 MCG/KG/MIN: 200 INJECTION, SOLUTION INTRAVENOUS at 20:35

## 2021-10-22 RX ADMIN — FUROSEMIDE 20 MG: 10 INJECTION, SOLUTION INTRAMUSCULAR; INTRAVENOUS at 22:03

## 2021-10-22 RX ADMIN — MORPHINE SULFATE 4 MG: 4 INJECTION INTRAVENOUS at 15:05

## 2021-10-23 ENCOUNTER — APPOINTMENT (INPATIENT)
Dept: NON INVASIVE DIAGNOSTICS | Facility: HOSPITAL | Age: 56
DRG: 003 | End: 2021-10-23
Payer: MEDICARE

## 2021-10-23 PROBLEM — I21.3 STEMI (ST ELEVATION MYOCARDIAL INFARCTION) (HCC): Status: ACTIVE | Noted: 2021-10-23

## 2021-10-23 PROBLEM — R57.0 CARDIOGENIC SHOCK (HCC): Status: ACTIVE | Noted: 2021-10-23

## 2021-10-23 LAB
ALBUMIN SERPL BCP-MCNC: 2.2 G/DL (ref 3.5–5)
ALP SERPL-CCNC: 131 U/L (ref 46–116)
ALT SERPL W P-5'-P-CCNC: 236 U/L (ref 12–78)
ANION GAP SERPL CALCULATED.3IONS-SCNC: 4 MMOL/L (ref 4–13)
ANION GAP SERPL CALCULATED.3IONS-SCNC: 6 MMOL/L (ref 4–13)
ANION GAP SERPL CALCULATED.3IONS-SCNC: 7 MMOL/L (ref 4–13)
ANION GAP SERPL CALCULATED.3IONS-SCNC: 9 MMOL/L (ref 4–13)
AORTIC ROOT: 3.2 CM
AORTIC VALVE MEAN VELOCITY: 9.1 M/S
APICAL FOUR CHAMBER EJECTION FRACTION: 40 %
ASCENDING AORTA: 4.3 CM
AST SERPL W P-5'-P-CCNC: 846 U/L (ref 5–45)
AV LVOT MEAN GRADIENT: 3 MMHG
AV LVOT PEAK GRADIENT: 6 MMHG
AV MEAN GRADIENT: 4 MMHG
AV PEAK GRADIENT: 7 MMHG
BACTERIA UR QL AUTO: ABNORMAL /HPF
BASE EX.OXY STD BLDV CALC-SCNC: 46.4 % (ref 60–80)
BASE EX.OXY STD BLDV CALC-SCNC: 47.3 % (ref 60–80)
BASE EX.OXY STD BLDV CALC-SCNC: 52.4 % (ref 60–80)
BASE EX.OXY STD BLDV CALC-SCNC: 57.6 % (ref 60–80)
BASE EXCESS BLDA CALC-SCNC: -1 MMOL/L
BASE EXCESS BLDA CALC-SCNC: 0.8 MMOL/L
BASE EXCESS BLDV CALC-SCNC: -0.2 MMOL/L
BASE EXCESS BLDV CALC-SCNC: 0.4 MMOL/L
BASE EXCESS BLDV CALC-SCNC: 0.6 MMOL/L
BASE EXCESS BLDV CALC-SCNC: 1.1 MMOL/L
BILIRUB DIRECT SERPL-MCNC: 0.52 MG/DL (ref 0–0.2)
BILIRUB SERPL-MCNC: 0.86 MG/DL (ref 0.2–1)
BILIRUB UR QL STRIP: ABNORMAL
BUN SERPL-MCNC: 20 MG/DL (ref 5–25)
BUN SERPL-MCNC: 21 MG/DL (ref 5–25)
BUN SERPL-MCNC: 25 MG/DL (ref 5–25)
BUN SERPL-MCNC: 25 MG/DL (ref 5–25)
CA-I BLD-SCNC: 1.07 MMOL/L (ref 1.12–1.32)
CALCIUM SERPL-MCNC: 8.2 MG/DL (ref 8.3–10.1)
CALCIUM SERPL-MCNC: 9.1 MG/DL (ref 8.3–10.1)
CALCIUM SERPL-MCNC: 9.3 MG/DL (ref 8.3–10.1)
CALCIUM SERPL-MCNC: 9.6 MG/DL (ref 8.3–10.1)
CHLORIDE SERPL-SCNC: 108 MMOL/L (ref 100–108)
CHLORIDE SERPL-SCNC: 109 MMOL/L (ref 100–108)
CHOLEST SERPL-MCNC: 106 MG/DL (ref 50–200)
CLARITY UR: CLEAR
CO2 SERPL-SCNC: 22 MMOL/L (ref 21–32)
CO2 SERPL-SCNC: 23 MMOL/L (ref 21–32)
CO2 SERPL-SCNC: 25 MMOL/L (ref 21–32)
CO2 SERPL-SCNC: 27 MMOL/L (ref 21–32)
COLOR UR: ABNORMAL
CREAT SERPL-MCNC: 1.2 MG/DL (ref 0.6–1.3)
CREAT SERPL-MCNC: 1.28 MG/DL (ref 0.6–1.3)
CREAT SERPL-MCNC: 1.42 MG/DL (ref 0.6–1.3)
CREAT SERPL-MCNC: 1.42 MG/DL (ref 0.6–1.3)
DOP CALC AO VTI: 20.25 CM
DOP CALC LVOT PEAK VEL VTI: 15.78 CM
DOP CALC LVOT PEAK VEL: 1.22 M/S
ERYTHROCYTE [DISTWIDTH] IN BLOOD BY AUTOMATED COUNT: 15.5 % (ref 11.6–15.1)
FERRITIN SERPL-MCNC: 2574 NG/ML (ref 8–388)
FRACTIONAL SHORTENING: 29 % (ref 28–44)
GFR SERPL CREATININE-BSD FRML MDRD: 42 ML/MIN/1.73SQ M
GFR SERPL CREATININE-BSD FRML MDRD: 42 ML/MIN/1.73SQ M
GFR SERPL CREATININE-BSD FRML MDRD: 47 ML/MIN/1.73SQ M
GFR SERPL CREATININE-BSD FRML MDRD: 51 ML/MIN/1.73SQ M
GLUCOSE SERPL-MCNC: 132 MG/DL (ref 65–140)
GLUCOSE SERPL-MCNC: 141 MG/DL (ref 65–140)
GLUCOSE SERPL-MCNC: 148 MG/DL (ref 65–140)
GLUCOSE SERPL-MCNC: 148 MG/DL (ref 65–140)
GLUCOSE SERPL-MCNC: 151 MG/DL (ref 65–140)
GLUCOSE SERPL-MCNC: 153 MG/DL (ref 65–140)
GLUCOSE SERPL-MCNC: 154 MG/DL (ref 65–140)
GLUCOSE SERPL-MCNC: 157 MG/DL (ref 65–140)
GLUCOSE SERPL-MCNC: 159 MG/DL (ref 65–140)
GLUCOSE SERPL-MCNC: 176 MG/DL (ref 65–140)
GLUCOSE SERPL-MCNC: 220 MG/DL (ref 65–140)
GLUCOSE SERPL-MCNC: 231 MG/DL (ref 65–140)
GLUCOSE SERPL-MCNC: 234 MG/DL (ref 65–140)
GLUCOSE SERPL-MCNC: 258 MG/DL (ref 65–140)
GLUCOSE SERPL-MCNC: 290 MG/DL (ref 65–140)
GLUCOSE SERPL-MCNC: 315 MG/DL (ref 65–140)
GLUCOSE SERPL-MCNC: 378 MG/DL (ref 65–140)
GLUCOSE UR STRIP-MCNC: NEGATIVE MG/DL
HCO3 BLDA-SCNC: 21 MMOL/L (ref 22–28)
HCO3 BLDA-SCNC: 23.1 MMOL/L (ref 22–28)
HCO3 BLDV-SCNC: 23.6 MMOL/L (ref 24–30)
HCO3 BLDV-SCNC: 24.2 MMOL/L (ref 24–30)
HCO3 BLDV-SCNC: 24.6 MMOL/L (ref 24–30)
HCO3 BLDV-SCNC: 25.6 MMOL/L (ref 24–30)
HCT VFR BLD AUTO: 31.1 % (ref 34.8–46.1)
HDLC SERPL-MCNC: 55 MG/DL
HGB BLD-MCNC: 10 G/DL (ref 11.5–15.4)
HGB BLD-MCNC: 10.6 G/DL (ref 11.5–15.4)
HGB UR QL STRIP.AUTO: NEGATIVE
INTERVENTRICULAR SEPTUM IN DIASTOLE (PARASTERNAL SHORT AXIS VIEW): 1.3 CM
IPAP: 12
IRON SATN MFR SERPL: 6 % (ref 15–50)
IRON SERPL-MCNC: 19 UG/DL (ref 50–170)
KETONES UR STRIP-MCNC: NEGATIVE MG/DL
LAAS-AP4: 22.9 CM2
LDLC SERPL CALC-MCNC: 36 MG/DL (ref 0–100)
LEFT INTERNAL DIMENSION IN SYSTOLE: 3.6 CM (ref 2.1–4)
LEFT VENTRICULAR INTERNAL DIMENSION IN DIASTOLE: 5.1 CM (ref 5.72–8.52)
LEFT VENTRICULAR POSTERIOR WALL IN END DIASTOLE: 1.6 CM
LEFT VENTRICULAR STROKE VOLUME: 69 ML
LEUKOCYTE ESTERASE UR QL STRIP: NEGATIVE
LV EF: 50 %
MAGNESIUM SERPL-MCNC: 1.9 MG/DL (ref 1.6–2.6)
MAGNESIUM SERPL-MCNC: 3 MG/DL (ref 1.6–2.6)
MCH RBC QN AUTO: 27.5 PG (ref 26.8–34.3)
MCHC RBC AUTO-ENTMCNC: 32.2 G/DL (ref 31.4–37.4)
MCV RBC AUTO: 86 FL (ref 82–98)
MV E'TISSUE VEL-LAT: 8 CM/S
MV E'TISSUE VEL-SEP: 6 CM/S
NASAL CANNULA: 2
NITRITE UR QL STRIP: NEGATIVE
NON VENT- BIPAP: ABNORMAL
NON-SQ EPI CELLS URNS QL MICRO: ABNORMAL /HPF
NONHDLC SERPL-MCNC: 51 MG/DL
O2 CT BLDA-SCNC: 14.7 ML/DL (ref 16–23)
O2 CT BLDA-SCNC: 15.5 ML/DL (ref 16–23)
O2 CT BLDV-SCNC: 7.1 ML/DL
O2 CT BLDV-SCNC: 7.5 ML/DL
O2 CT BLDV-SCNC: 7.9 ML/DL
O2 CT BLDV-SCNC: 8.8 ML/DL
OXYHGB MFR BLDA: 96 % (ref 94–97)
OXYHGB MFR BLDA: 96.2 % (ref 94–97)
PCO2 BLDA: 26.9 MM HG (ref 36–44)
PCO2 BLDA: 29.8 MM HG (ref 36–44)
PCO2 BLDV: 35.4 MM HG (ref 42–50)
PCO2 BLDV: 36.1 MM HG (ref 42–50)
PCO2 BLDV: 37.1 MM HG (ref 42–50)
PCO2 BLDV: 40.4 MM HG (ref 42–50)
PEEP MAX SETTING VENT: 8 CM[H2O]
PH BLDA: 7.51 [PH] (ref 7.35–7.45)
PH BLDA: 7.51 [PH] (ref 7.35–7.45)
PH BLDV: 7.42 [PH] (ref 7.3–7.4)
PH BLDV: 7.44 [PH] (ref 7.3–7.4)
PH BLDV: 7.44 [PH] (ref 7.3–7.4)
PH BLDV: 7.45 [PH] (ref 7.3–7.4)
PH UR STRIP.AUTO: 5 [PH]
PHOSPHATE SERPL-MCNC: 2.9 MG/DL (ref 2.7–4.5)
PLATELET # BLD AUTO: 223 THOUSANDS/UL (ref 149–390)
PMV BLD AUTO: 10.9 FL (ref 8.9–12.7)
PO2 BLDA: 90.2 MM HG (ref 75–129)
PO2 BLDA: 96.5 MM HG (ref 75–129)
PO2 BLDV: 27 MM HG (ref 35–45)
PO2 BLDV: 27.7 MM HG (ref 35–45)
PO2 BLDV: 29.9 MM HG (ref 35–45)
PO2 BLDV: 32.2 MM HG (ref 35–45)
POTASSIUM SERPL-SCNC: 3.3 MMOL/L (ref 3.5–5.3)
POTASSIUM SERPL-SCNC: 3.5 MMOL/L (ref 3.5–5.3)
POTASSIUM SERPL-SCNC: 3.6 MMOL/L (ref 3.5–5.3)
POTASSIUM SERPL-SCNC: 3.9 MMOL/L (ref 3.5–5.3)
PROT SERPL-MCNC: 5.9 G/DL (ref 6.4–8.2)
PROT UR STRIP-MCNC: ABNORMAL MG/DL
RBC # BLD AUTO: 3.63 MILLION/UL (ref 3.81–5.12)
RBC #/AREA URNS AUTO: ABNORMAL /HPF
RIGHT ATRIAL 2D VOLUME: 51 ML
RIGHT VENTRICLE ID DIMENSION: 2.7 CM
SL CV LV EF: 40
SL CV PED ECHO LEFT VENTRICLE DIASTOLIC VOLUME (MOD BIPLANE) 2D: 124 ML
SL CV PED ECHO LEFT VENTRICLE SYSTOLIC VOLUME (MOD BIPLANE) 2D: 56 ML
SODIUM SERPL-SCNC: 138 MMOL/L (ref 136–145)
SODIUM SERPL-SCNC: 139 MMOL/L (ref 136–145)
SODIUM SERPL-SCNC: 139 MMOL/L (ref 136–145)
SODIUM SERPL-SCNC: 140 MMOL/L (ref 136–145)
SP GR UR STRIP.AUTO: >1.045 (ref 1–1.03)
SPECIMEN SOURCE: ABNORMAL
SPECIMEN SOURCE: ABNORMAL
TIBC SERPL-MCNC: 303 UG/DL (ref 250–450)
TR PEAK VELOCITY: 2.5 M/S
TRICUSPID VALVE PEAK REGURGITATION VELOCITY: 2.49 M/S
TRICUSPID VALVE S': 59 CM/S
TRIGL SERPL-MCNC: 75 MG/DL
TV PEAK GRADIENT: 25 MMHG
UROBILINOGEN UR QL STRIP.AUTO: 1 E.U./DL
VENT BIPAP FIO2: 40 %
WBC # BLD AUTO: 14.35 THOUSAND/UL (ref 4.31–10.16)
WBC #/AREA URNS AUTO: ABNORMAL /HPF
Z-SCORE OF LEFT VENTRICULAR DIMENSION IN END SYSTOLE: -3.14

## 2021-10-23 PROCEDURE — 82728 ASSAY OF FERRITIN: CPT | Performed by: INTERNAL MEDICINE

## 2021-10-23 PROCEDURE — NC001 PR NO CHARGE: Performed by: INTERNAL MEDICINE

## 2021-10-23 PROCEDURE — 84100 ASSAY OF PHOSPHORUS: CPT | Performed by: EMERGENCY MEDICINE

## 2021-10-23 PROCEDURE — 81001 URINALYSIS AUTO W/SCOPE: CPT | Performed by: PHYSICIAN ASSISTANT

## 2021-10-23 PROCEDURE — 82330 ASSAY OF CALCIUM: CPT | Performed by: EMERGENCY MEDICINE

## 2021-10-23 PROCEDURE — 93306 TTE W/DOPPLER COMPLETE: CPT

## 2021-10-23 PROCEDURE — 80048 BASIC METABOLIC PNL TOTAL CA: CPT | Performed by: EMERGENCY MEDICINE

## 2021-10-23 PROCEDURE — 82948 REAGENT STRIP/BLOOD GLUCOSE: CPT

## 2021-10-23 PROCEDURE — 93306 TTE W/DOPPLER COMPLETE: CPT | Performed by: INTERNAL MEDICINE

## 2021-10-23 PROCEDURE — 85027 COMPLETE CBC AUTOMATED: CPT | Performed by: STUDENT IN AN ORGANIZED HEALTH CARE EDUCATION/TRAINING PROGRAM

## 2021-10-23 PROCEDURE — 83550 IRON BINDING TEST: CPT | Performed by: INTERNAL MEDICINE

## 2021-10-23 PROCEDURE — 80048 BASIC METABOLIC PNL TOTAL CA: CPT | Performed by: PHYSICIAN ASSISTANT

## 2021-10-23 PROCEDURE — 94002 VENT MGMT INPAT INIT DAY: CPT

## 2021-10-23 PROCEDURE — 82805 BLOOD GASES W/O2 SATURATION: CPT | Performed by: INTERNAL MEDICINE

## 2021-10-23 PROCEDURE — 94760 N-INVAS EAR/PLS OXIMETRY 1: CPT

## 2021-10-23 PROCEDURE — 80048 BASIC METABOLIC PNL TOTAL CA: CPT | Performed by: STUDENT IN AN ORGANIZED HEALTH CARE EDUCATION/TRAINING PROGRAM

## 2021-10-23 PROCEDURE — 36620 INSERTION CATHETER ARTERY: CPT | Performed by: INTERNAL MEDICINE

## 2021-10-23 PROCEDURE — 82805 BLOOD GASES W/O2 SATURATION: CPT | Performed by: PHYSICIAN ASSISTANT

## 2021-10-23 PROCEDURE — 83735 ASSAY OF MAGNESIUM: CPT | Performed by: EMERGENCY MEDICINE

## 2021-10-23 PROCEDURE — 93308 TTE F-UP OR LMTD: CPT | Performed by: INTERNAL MEDICINE

## 2021-10-23 PROCEDURE — 93325 DOPPLER ECHO COLOR FLOW MAPG: CPT | Performed by: INTERNAL MEDICINE

## 2021-10-23 PROCEDURE — 99291 CRITICAL CARE FIRST HOUR: CPT | Performed by: PHYSICIAN ASSISTANT

## 2021-10-23 PROCEDURE — 80076 HEPATIC FUNCTION PANEL: CPT | Performed by: INTERNAL MEDICINE

## 2021-10-23 PROCEDURE — 80061 LIPID PANEL: CPT | Performed by: STUDENT IN AN ORGANIZED HEALTH CARE EDUCATION/TRAINING PROGRAM

## 2021-10-23 PROCEDURE — 99232 SBSQ HOSP IP/OBS MODERATE 35: CPT | Performed by: INTERNAL MEDICINE

## 2021-10-23 PROCEDURE — 83735 ASSAY OF MAGNESIUM: CPT | Performed by: STUDENT IN AN ORGANIZED HEALTH CARE EDUCATION/TRAINING PROGRAM

## 2021-10-23 PROCEDURE — 82805 BLOOD GASES W/O2 SATURATION: CPT | Performed by: EMERGENCY MEDICINE

## 2021-10-23 PROCEDURE — 83540 ASSAY OF IRON: CPT | Performed by: INTERNAL MEDICINE

## 2021-10-23 PROCEDURE — 85018 HEMOGLOBIN: CPT | Performed by: PHYSICIAN ASSISTANT

## 2021-10-23 PROCEDURE — 93321 DOPPLER ECHO F-UP/LMTD STD: CPT | Performed by: INTERNAL MEDICINE

## 2021-10-23 PROCEDURE — 93308 TTE F-UP OR LMTD: CPT

## 2021-10-23 RX ORDER — MAGNESIUM SULFATE HEPTAHYDRATE 40 MG/ML
2 INJECTION, SOLUTION INTRAVENOUS ONCE
Status: COMPLETED | OUTPATIENT
Start: 2021-10-23 | End: 2021-10-23

## 2021-10-23 RX ORDER — METOLAZONE 5 MG/1
10 TABLET ORAL ONCE
Status: COMPLETED | OUTPATIENT
Start: 2021-10-23 | End: 2021-10-23

## 2021-10-23 RX ORDER — HYDRALAZINE HYDROCHLORIDE 25 MG/1
25 TABLET, FILM COATED ORAL EVERY 8 HOURS SCHEDULED
Status: DISCONTINUED | OUTPATIENT
Start: 2021-10-23 | End: 2021-10-25

## 2021-10-23 RX ORDER — FUROSEMIDE 10 MG/ML
10 SYRINGE (ML) INJECTION CONTINUOUS
Status: DISCONTINUED | OUTPATIENT
Start: 2021-10-23 | End: 2021-10-28

## 2021-10-23 RX ORDER — FUROSEMIDE 10 MG/ML
80 INJECTION INTRAMUSCULAR; INTRAVENOUS
Status: DISCONTINUED | OUTPATIENT
Start: 2021-10-23 | End: 2021-10-23

## 2021-10-23 RX ORDER — ALBUTEROL SULFATE 2.5 MG/3ML
2.5 SOLUTION RESPIRATORY (INHALATION) EVERY 4 HOURS PRN
Status: DISCONTINUED | OUTPATIENT
Start: 2021-10-23 | End: 2021-11-23 | Stop reason: HOSPADM

## 2021-10-23 RX ORDER — POTASSIUM CHLORIDE 29.8 MG/ML
40 INJECTION INTRAVENOUS ONCE
Status: COMPLETED | OUTPATIENT
Start: 2021-10-23 | End: 2021-10-23

## 2021-10-23 RX ORDER — POTASSIUM CHLORIDE 14.9 MG/ML
20 INJECTION INTRAVENOUS ONCE
Status: COMPLETED | OUTPATIENT
Start: 2021-10-23 | End: 2021-10-23

## 2021-10-23 RX ORDER — FUROSEMIDE 10 MG/ML
40 INJECTION INTRAMUSCULAR; INTRAVENOUS ONCE
Status: COMPLETED | OUTPATIENT
Start: 2021-10-23 | End: 2021-10-23

## 2021-10-23 RX ORDER — ISOSORBIDE DINITRATE 10 MG/1
10 TABLET ORAL
Status: DISCONTINUED | OUTPATIENT
Start: 2021-10-23 | End: 2021-10-25

## 2021-10-23 RX ORDER — CALCIUM CARBONATE 200(500)MG
500 TABLET,CHEWABLE ORAL DAILY PRN
Status: DISCONTINUED | OUTPATIENT
Start: 2021-10-23 | End: 2021-11-16

## 2021-10-23 RX ORDER — HEPARIN SODIUM 5000 [USP'U]/ML
5000 INJECTION, SOLUTION INTRAVENOUS; SUBCUTANEOUS EVERY 8 HOURS SCHEDULED
Status: DISCONTINUED | OUTPATIENT
Start: 2021-10-23 | End: 2021-10-27

## 2021-10-23 RX ADMIN — ACETAMINOPHEN 650 MG: 325 TABLET, FILM COATED ORAL at 10:02

## 2021-10-23 RX ADMIN — FUROSEMIDE 40 MG: 10 INJECTION, SOLUTION INTRAMUSCULAR; INTRAVENOUS at 06:36

## 2021-10-23 RX ADMIN — FUROSEMIDE 80 MG: 10 INJECTION, SOLUTION INTRAMUSCULAR; INTRAVENOUS at 10:55

## 2021-10-23 RX ADMIN — HEPARIN SODIUM 5000 UNITS: 5000 INJECTION INTRAVENOUS; SUBCUTANEOUS at 08:34

## 2021-10-23 RX ADMIN — MAGNESIUM SULFATE HEPTAHYDRATE 2 G: 40 INJECTION, SOLUTION INTRAVENOUS at 02:07

## 2021-10-23 RX ADMIN — SODIUM CHLORIDE 4 UNITS/HR: 9 INJECTION, SOLUTION INTRAVENOUS at 08:34

## 2021-10-23 RX ADMIN — METOLAZONE 10 MG: 5 TABLET ORAL at 13:29

## 2021-10-23 RX ADMIN — TICAGRELOR 90 MG: 90 TABLET ORAL at 20:07

## 2021-10-23 RX ADMIN — DICYCLOMINE HYDROCHLORIDE 20 MG: 20 TABLET ORAL at 22:53

## 2021-10-23 RX ADMIN — Medication 40 MG/HR: at 22:50

## 2021-10-23 RX ADMIN — MILRINONE LACTATE IN DEXTROSE 0.38 MCG/KG/MIN: 200 INJECTION, SOLUTION INTRAVENOUS at 02:42

## 2021-10-23 RX ADMIN — HEPARIN SODIUM 5000 UNITS: 5000 INJECTION INTRAVENOUS; SUBCUTANEOUS at 14:40

## 2021-10-23 RX ADMIN — ASPIRIN 81 MG CHEWABLE TABLET 81 MG: 81 TABLET CHEWABLE at 08:33

## 2021-10-23 RX ADMIN — Medication 500 MG: at 20:06

## 2021-10-23 RX ADMIN — ONDANSETRON 4 MG: 2 INJECTION INTRAMUSCULAR; INTRAVENOUS at 18:00

## 2021-10-23 RX ADMIN — POTASSIUM CHLORIDE 20 MEQ: 14.9 INJECTION, SOLUTION INTRAVENOUS at 05:51

## 2021-10-23 RX ADMIN — CALCIUM GLUCONATE 3 G: 98 INJECTION, SOLUTION INTRAVENOUS at 02:18

## 2021-10-23 RX ADMIN — HYDRALAZINE HYDROCHLORIDE 25 MG: 25 TABLET ORAL at 22:11

## 2021-10-23 RX ADMIN — MILRINONE LACTATE IN DEXTROSE 0.25 MCG/KG/MIN: 200 INJECTION, SOLUTION INTRAVENOUS at 12:43

## 2021-10-23 RX ADMIN — ISOSORBIDE DINITRATE 10 MG: 10 TABLET ORAL at 16:30

## 2021-10-23 RX ADMIN — Medication 20 MG/HR: at 15:26

## 2021-10-23 RX ADMIN — ISOSORBIDE DINITRATE 10 MG: 10 TABLET ORAL at 12:02

## 2021-10-23 RX ADMIN — POTASSIUM CHLORIDE 40 MEQ: 29.8 INJECTION, SOLUTION INTRAVENOUS at 02:17

## 2021-10-23 RX ADMIN — TICAGRELOR 90 MG: 90 TABLET ORAL at 08:33

## 2021-10-23 RX ADMIN — ATORVASTATIN CALCIUM 40 MG: 40 TABLET, FILM COATED ORAL at 16:29

## 2021-10-23 RX ADMIN — POTASSIUM CHLORIDE 40 MEQ: 29.8 INJECTION, SOLUTION INTRAVENOUS at 16:29

## 2021-10-23 RX ADMIN — ONDANSETRON 4 MG: 2 INJECTION INTRAMUSCULAR; INTRAVENOUS at 00:32

## 2021-10-23 RX ADMIN — HEPARIN SODIUM 5000 UNITS: 5000 INJECTION INTRAVENOUS; SUBCUTANEOUS at 22:10

## 2021-10-24 PROBLEM — N17.9 AKI (ACUTE KIDNEY INJURY) (HCC): Status: ACTIVE | Noted: 2021-10-24

## 2021-10-24 LAB
ALBUMIN SERPL BCP-MCNC: 2.2 G/DL (ref 3.5–5)
ALP SERPL-CCNC: 122 U/L (ref 46–116)
ALT SERPL W P-5'-P-CCNC: 179 U/L (ref 12–78)
ANION GAP SERPL CALCULATED.3IONS-SCNC: 5 MMOL/L (ref 4–13)
ANION GAP SERPL CALCULATED.3IONS-SCNC: 6 MMOL/L (ref 4–13)
ANION GAP SERPL CALCULATED.3IONS-SCNC: 6 MMOL/L (ref 4–13)
ANION GAP SERPL CALCULATED.3IONS-SCNC: 7 MMOL/L (ref 4–13)
ARTERIAL PATENCY WRIST A: NO
AST SERPL W P-5'-P-CCNC: 289 U/L (ref 5–45)
BASE EX.OXY STD BLDV CALC-SCNC: 40.3 % (ref 60–80)
BASE EX.OXY STD BLDV CALC-SCNC: 60.4 % (ref 60–80)
BASE EXCESS BLDA CALC-SCNC: -6.2 MMOL/L
BASE EXCESS BLDV CALC-SCNC: 1.8 MMOL/L
BASE EXCESS BLDV CALC-SCNC: 2.1 MMOL/L
BILIRUB DIRECT SERPL-MCNC: 0.44 MG/DL (ref 0–0.2)
BILIRUB SERPL-MCNC: 1.01 MG/DL (ref 0.2–1)
BUN SERPL-MCNC: 26 MG/DL (ref 5–25)
BUN SERPL-MCNC: 28 MG/DL (ref 5–25)
BUN SERPL-MCNC: 33 MG/DL (ref 5–25)
BUN SERPL-MCNC: 36 MG/DL (ref 5–25)
CA-I BLD-SCNC: 1.13 MMOL/L (ref 1.12–1.32)
CA-I BLD-SCNC: 1.15 MMOL/L (ref 1.12–1.32)
CALCIUM SERPL-MCNC: 8.8 MG/DL (ref 8.3–10.1)
CALCIUM SERPL-MCNC: 8.9 MG/DL (ref 8.3–10.1)
CALCIUM SERPL-MCNC: 9.1 MG/DL (ref 8.3–10.1)
CALCIUM SERPL-MCNC: 9.4 MG/DL (ref 8.3–10.1)
CHLORIDE SERPL-SCNC: 105 MMOL/L (ref 100–108)
CHLORIDE SERPL-SCNC: 106 MMOL/L (ref 100–108)
CO2 SERPL-SCNC: 27 MMOL/L (ref 21–32)
CO2 SERPL-SCNC: 28 MMOL/L (ref 21–32)
CO2 SERPL-SCNC: 28 MMOL/L (ref 21–32)
CO2 SERPL-SCNC: 29 MMOL/L (ref 21–32)
CREAT SERPL-MCNC: 1.45 MG/DL (ref 0.6–1.3)
CREAT SERPL-MCNC: 1.72 MG/DL (ref 0.6–1.3)
CREAT SERPL-MCNC: 2.03 MG/DL (ref 0.6–1.3)
CREAT SERPL-MCNC: 2.04 MG/DL (ref 0.6–1.3)
ERYTHROCYTE [DISTWIDTH] IN BLOOD BY AUTOMATED COUNT: 15.9 % (ref 11.6–15.1)
GFR SERPL CREATININE-BSD FRML MDRD: 27 ML/MIN/1.73SQ M
GFR SERPL CREATININE-BSD FRML MDRD: 27 ML/MIN/1.73SQ M
GFR SERPL CREATININE-BSD FRML MDRD: 33 ML/MIN/1.73SQ M
GFR SERPL CREATININE-BSD FRML MDRD: 41 ML/MIN/1.73SQ M
GLUCOSE SERPL-MCNC: 119 MG/DL (ref 65–140)
GLUCOSE SERPL-MCNC: 125 MG/DL (ref 65–140)
GLUCOSE SERPL-MCNC: 128 MG/DL (ref 65–140)
GLUCOSE SERPL-MCNC: 132 MG/DL (ref 65–140)
GLUCOSE SERPL-MCNC: 132 MG/DL (ref 65–140)
GLUCOSE SERPL-MCNC: 133 MG/DL (ref 65–140)
GLUCOSE SERPL-MCNC: 135 MG/DL (ref 65–140)
GLUCOSE SERPL-MCNC: 136 MG/DL (ref 65–140)
GLUCOSE SERPL-MCNC: 143 MG/DL (ref 65–140)
GLUCOSE SERPL-MCNC: 146 MG/DL (ref 65–140)
GLUCOSE SERPL-MCNC: 146 MG/DL (ref 65–140)
GLUCOSE SERPL-MCNC: 150 MG/DL (ref 65–140)
GLUCOSE SERPL-MCNC: 163 MG/DL (ref 65–140)
GLUCOSE SERPL-MCNC: 165 MG/DL (ref 65–140)
GLUCOSE SERPL-MCNC: 189 MG/DL (ref 65–140)
GLUCOSE SERPL-MCNC: 200 MG/DL (ref 65–140)
HCO3 BLDA-SCNC: 15.7 MMOL/L (ref 22–28)
HCO3 BLDV-SCNC: 24.8 MMOL/L (ref 24–30)
HCO3 BLDV-SCNC: 25.8 MMOL/L (ref 24–30)
HCT VFR BLD AUTO: 30.3 % (ref 34.8–46.1)
HGB BLD-MCNC: 9.7 G/DL (ref 11.5–15.4)
IPAP: 12
LACTATE SERPL-SCNC: 1.6 MMOL/L (ref 0.5–2)
LACTATE SERPL-SCNC: 2 MMOL/L (ref 0.5–2)
LACTATE SERPL-SCNC: 2.2 MMOL/L (ref 0.5–2)
MAGNESIUM SERPL-MCNC: 2.2 MG/DL (ref 1.6–2.6)
MAGNESIUM SERPL-MCNC: 2.3 MG/DL (ref 1.6–2.6)
MCH RBC QN AUTO: 27.8 PG (ref 26.8–34.3)
MCHC RBC AUTO-ENTMCNC: 32 G/DL (ref 31.4–37.4)
MCV RBC AUTO: 87 FL (ref 82–98)
NASAL CANNULA: 4
NON VENT- BIPAP: ABNORMAL
O2 CT BLDA-SCNC: 8.6 ML/DL (ref 16–23)
O2 CT BLDV-SCNC: 4.1 ML/DL
O2 CT BLDV-SCNC: 6 ML/DL
OXYHGB MFR BLDA: 96.2 % (ref 94–97)
PCO2 BLDA: 18.9 MM HG (ref 36–44)
PCO2 BLDV: 33.3 MM HG (ref 42–50)
PCO2 BLDV: 35.2 MM HG (ref 42–50)
PEEP MAX SETTING VENT: 8 CM[H2O]
PH BLDA: 7.54 [PH] (ref 7.35–7.45)
PH BLDV: 7.48 [PH] (ref 7.3–7.4)
PH BLDV: 7.49 [PH] (ref 7.3–7.4)
PHOSPHATE SERPL-MCNC: 4.7 MG/DL (ref 2.7–4.5)
PHOSPHATE SERPL-MCNC: 5.6 MG/DL (ref 2.7–4.5)
PLATELET # BLD AUTO: 239 THOUSANDS/UL (ref 149–390)
PMV BLD AUTO: 11 FL (ref 8.9–12.7)
PO2 BLDA: 106.3 MM HG (ref 75–129)
PO2 BLDV: 24.2 MM HG (ref 35–45)
PO2 BLDV: 33.6 MM HG (ref 35–45)
POTASSIUM SERPL-SCNC: 2.8 MMOL/L (ref 3.5–5.3)
POTASSIUM SERPL-SCNC: 3.4 MMOL/L (ref 3.5–5.3)
POTASSIUM SERPL-SCNC: 3.6 MMOL/L (ref 3.5–5.3)
POTASSIUM SERPL-SCNC: 3.6 MMOL/L (ref 3.5–5.3)
POTASSIUM SERPL-SCNC: 4.1 MMOL/L (ref 3.5–5.3)
PROCALCITONIN SERPL-MCNC: 11.53 NG/ML
PROT SERPL-MCNC: 6.3 G/DL (ref 6.4–8.2)
RBC # BLD AUTO: 3.49 MILLION/UL (ref 3.81–5.12)
SODIUM SERPL-SCNC: 138 MMOL/L (ref 136–145)
SODIUM SERPL-SCNC: 139 MMOL/L (ref 136–145)
SODIUM SERPL-SCNC: 140 MMOL/L (ref 136–145)
SODIUM SERPL-SCNC: 140 MMOL/L (ref 136–145)
SPECIMEN SOURCE: ABNORMAL
TROPONIN I SERPL-MCNC: >40 NG/ML
VENT BIPAP FIO2: 40 %
WBC # BLD AUTO: 20.65 THOUSAND/UL (ref 4.31–10.16)

## 2021-10-24 PROCEDURE — 85027 COMPLETE CBC AUTOMATED: CPT | Performed by: PHYSICIAN ASSISTANT

## 2021-10-24 PROCEDURE — 80076 HEPATIC FUNCTION PANEL: CPT | Performed by: PHYSICIAN ASSISTANT

## 2021-10-24 PROCEDURE — 84145 PROCALCITONIN (PCT): CPT | Performed by: ANESTHESIOLOGY

## 2021-10-24 PROCEDURE — 99292 CRITICAL CARE ADDL 30 MIN: CPT | Performed by: ANESTHESIOLOGY

## 2021-10-24 PROCEDURE — 82805 BLOOD GASES W/O2 SATURATION: CPT | Performed by: PHYSICIAN ASSISTANT

## 2021-10-24 PROCEDURE — 84132 ASSAY OF SERUM POTASSIUM: CPT | Performed by: PHYSICIAN ASSISTANT

## 2021-10-24 PROCEDURE — 99291 CRITICAL CARE FIRST HOUR: CPT | Performed by: PHYSICIAN ASSISTANT

## 2021-10-24 PROCEDURE — 80048 BASIC METABOLIC PNL TOTAL CA: CPT | Performed by: PHYSICIAN ASSISTANT

## 2021-10-24 PROCEDURE — 82948 REAGENT STRIP/BLOOD GLUCOSE: CPT

## 2021-10-24 PROCEDURE — 93005 ELECTROCARDIOGRAM TRACING: CPT

## 2021-10-24 PROCEDURE — 94760 N-INVAS EAR/PLS OXIMETRY 1: CPT

## 2021-10-24 PROCEDURE — 84100 ASSAY OF PHOSPHORUS: CPT | Performed by: PHYSICIAN ASSISTANT

## 2021-10-24 PROCEDURE — 99222 1ST HOSP IP/OBS MODERATE 55: CPT | Performed by: INTERNAL MEDICINE

## 2021-10-24 PROCEDURE — 82330 ASSAY OF CALCIUM: CPT | Performed by: PHYSICIAN ASSISTANT

## 2021-10-24 PROCEDURE — 93308 TTE F-UP OR LMTD: CPT | Performed by: ANESTHESIOLOGY

## 2021-10-24 PROCEDURE — 83605 ASSAY OF LACTIC ACID: CPT | Performed by: PHYSICIAN ASSISTANT

## 2021-10-24 PROCEDURE — 99232 SBSQ HOSP IP/OBS MODERATE 35: CPT | Performed by: INTERNAL MEDICINE

## 2021-10-24 PROCEDURE — 83735 ASSAY OF MAGNESIUM: CPT | Performed by: PHYSICIAN ASSISTANT

## 2021-10-24 PROCEDURE — 84484 ASSAY OF TROPONIN QUANT: CPT | Performed by: PHYSICIAN ASSISTANT

## 2021-10-24 RX ORDER — DIPHENHYDRAMINE HYDROCHLORIDE 50 MG/ML
25 INJECTION INTRAMUSCULAR; INTRAVENOUS ONCE
Status: COMPLETED | OUTPATIENT
Start: 2021-10-24 | End: 2021-10-24

## 2021-10-24 RX ORDER — METOPROLOL TARTRATE 5 MG/5ML
5 INJECTION INTRAVENOUS ONCE
Status: COMPLETED | OUTPATIENT
Start: 2021-10-24 | End: 2021-10-24

## 2021-10-24 RX ORDER — ATROPINE SULFATE 0.1 MG/ML
INJECTION INTRAVENOUS
Status: DISPENSED
Start: 2021-10-24 | End: 2021-10-24

## 2021-10-24 RX ORDER — POTASSIUM CHLORIDE 29.8 MG/ML
40 INJECTION INTRAVENOUS
Status: COMPLETED | OUTPATIENT
Start: 2021-10-24 | End: 2021-10-24

## 2021-10-24 RX ORDER — ALBUMIN (HUMAN) 12.5 G/50ML
25 SOLUTION INTRAVENOUS ONCE
Status: COMPLETED | OUTPATIENT
Start: 2021-10-24 | End: 2021-10-24

## 2021-10-24 RX ORDER — METOLAZONE 5 MG/1
10 TABLET ORAL ONCE
Status: COMPLETED | OUTPATIENT
Start: 2021-10-24 | End: 2021-10-24

## 2021-10-24 RX ADMIN — HYDRALAZINE HYDROCHLORIDE 25 MG: 25 TABLET ORAL at 13:53

## 2021-10-24 RX ADMIN — HEPARIN SODIUM 5000 UNITS: 5000 INJECTION INTRAVENOUS; SUBCUTANEOUS at 21:16

## 2021-10-24 RX ADMIN — HYDRALAZINE HYDROCHLORIDE 25 MG: 25 TABLET ORAL at 21:16

## 2021-10-24 RX ADMIN — METOLAZONE 10 MG: 5 TABLET ORAL at 11:28

## 2021-10-24 RX ADMIN — ISOSORBIDE DINITRATE 10 MG: 10 TABLET ORAL at 17:16

## 2021-10-24 RX ADMIN — ISOSORBIDE DINITRATE 10 MG: 10 TABLET ORAL at 08:48

## 2021-10-24 RX ADMIN — MILRINONE LACTATE IN DEXTROSE 0.38 MCG/KG/MIN: 200 INJECTION, SOLUTION INTRAVENOUS at 20:55

## 2021-10-24 RX ADMIN — TICAGRELOR 90 MG: 90 TABLET ORAL at 08:48

## 2021-10-24 RX ADMIN — HEPARIN SODIUM 5000 UNITS: 5000 INJECTION INTRAVENOUS; SUBCUTANEOUS at 13:53

## 2021-10-24 RX ADMIN — ASPIRIN 81 MG CHEWABLE TABLET 81 MG: 81 TABLET CHEWABLE at 08:48

## 2021-10-24 RX ADMIN — MILRINONE LACTATE IN DEXTROSE 0.25 MCG/KG/MIN: 200 INJECTION, SOLUTION INTRAVENOUS at 03:24

## 2021-10-24 RX ADMIN — TICAGRELOR 90 MG: 90 TABLET ORAL at 21:16

## 2021-10-24 RX ADMIN — ONDANSETRON 4 MG: 2 INJECTION INTRAMUSCULAR; INTRAVENOUS at 17:14

## 2021-10-24 RX ADMIN — POTASSIUM CHLORIDE 40 MEQ: 29.8 INJECTION, SOLUTION INTRAVENOUS at 16:05

## 2021-10-24 RX ADMIN — ONDANSETRON 4 MG: 2 INJECTION INTRAMUSCULAR; INTRAVENOUS at 07:37

## 2021-10-24 RX ADMIN — POTASSIUM CHLORIDE 40 MEQ: 29.8 INJECTION, SOLUTION INTRAVENOUS at 04:42

## 2021-10-24 RX ADMIN — HEPARIN SODIUM 5000 UNITS: 5000 INJECTION INTRAVENOUS; SUBCUTANEOUS at 05:55

## 2021-10-24 RX ADMIN — ATORVASTATIN CALCIUM 40 MG: 40 TABLET, FILM COATED ORAL at 16:05

## 2021-10-24 RX ADMIN — POTASSIUM CHLORIDE 40 MEQ: 29.8 INJECTION, SOLUTION INTRAVENOUS at 13:54

## 2021-10-24 RX ADMIN — METOROPROLOL TARTRATE 5 MG: 5 INJECTION, SOLUTION INTRAVENOUS at 01:04

## 2021-10-24 RX ADMIN — POTASSIUM CHLORIDE 40 MEQ: 29.8 INJECTION, SOLUTION INTRAVENOUS at 07:07

## 2021-10-24 RX ADMIN — POTASSIUM CHLORIDE 40 MEQ: 29.8 INJECTION, SOLUTION INTRAVENOUS at 02:30

## 2021-10-24 RX ADMIN — Medication 40 MG/HR: at 10:52

## 2021-10-24 RX ADMIN — METOROPROLOL TARTRATE 5 MG: 5 INJECTION, SOLUTION INTRAVENOUS at 00:55

## 2021-10-24 RX ADMIN — SODIUM CHLORIDE 4 UNITS/HR: 9 INJECTION, SOLUTION INTRAVENOUS at 10:52

## 2021-10-24 RX ADMIN — MILRINONE LACTATE IN DEXTROSE 0.38 MCG/KG/MIN: 200 INJECTION, SOLUTION INTRAVENOUS at 10:52

## 2021-10-24 RX ADMIN — DIPHENHYDRAMINE HYDROCHLORIDE 25 MG: 50 INJECTION, SOLUTION INTRAMUSCULAR; INTRAVENOUS at 19:06

## 2021-10-24 RX ADMIN — ALBUMIN (HUMAN) 25 G: 0.25 INJECTION, SOLUTION INTRAVENOUS at 04:08

## 2021-10-24 RX ADMIN — Medication 40 MG/HR: at 23:29

## 2021-10-25 LAB
ALBUMIN SERPL BCP-MCNC: 2.3 G/DL (ref 3.5–5)
ALP SERPL-CCNC: 124 U/L (ref 46–116)
ALT SERPL W P-5'-P-CCNC: 366 U/L (ref 12–78)
ANION GAP SERPL CALCULATED.3IONS-SCNC: 4 MMOL/L (ref 4–13)
ANION GAP SERPL CALCULATED.3IONS-SCNC: 7 MMOL/L (ref 4–13)
ANION GAP SERPL CALCULATED.3IONS-SCNC: 7 MMOL/L (ref 4–13)
ANION GAP SERPL CALCULATED.3IONS-SCNC: 9 MMOL/L (ref 4–13)
AST SERPL W P-5'-P-CCNC: 230 U/L (ref 5–45)
ATRIAL RATE: 72 BPM
BASE EX.OXY STD BLDV CALC-SCNC: 60.6 % (ref 60–80)
BASE EXCESS BLDV CALC-SCNC: 4.1 MMOL/L
BILIRUB DIRECT SERPL-MCNC: 0.33 MG/DL (ref 0–0.2)
BILIRUB SERPL-MCNC: 1.05 MG/DL (ref 0.2–1)
BUN SERPL-MCNC: 42 MG/DL (ref 5–25)
BUN SERPL-MCNC: 44 MG/DL (ref 5–25)
CA-I BLD-SCNC: 1.05 MMOL/L (ref 1.12–1.32)
CALCIUM SERPL-MCNC: 10 MG/DL (ref 8.3–10.1)
CALCIUM SERPL-MCNC: 8.8 MG/DL (ref 8.3–10.1)
CALCIUM SERPL-MCNC: 9.3 MG/DL (ref 8.3–10.1)
CALCIUM SERPL-MCNC: 9.5 MG/DL (ref 8.3–10.1)
CHLORIDE SERPL-SCNC: 100 MMOL/L (ref 100–108)
CHLORIDE SERPL-SCNC: 96 MMOL/L (ref 100–108)
CHLORIDE SERPL-SCNC: 97 MMOL/L (ref 100–108)
CHLORIDE SERPL-SCNC: 99 MMOL/L (ref 100–108)
CO2 SERPL-SCNC: 29 MMOL/L (ref 21–32)
CO2 SERPL-SCNC: 30 MMOL/L (ref 21–32)
CO2 SERPL-SCNC: 30 MMOL/L (ref 21–32)
CO2 SERPL-SCNC: 31 MMOL/L (ref 21–32)
CREAT SERPL-MCNC: 2.02 MG/DL (ref 0.6–1.3)
CREAT SERPL-MCNC: 2.03 MG/DL (ref 0.6–1.3)
CREAT SERPL-MCNC: 2.13 MG/DL (ref 0.6–1.3)
CREAT SERPL-MCNC: 2.14 MG/DL (ref 0.6–1.3)
ERYTHROCYTE [DISTWIDTH] IN BLOOD BY AUTOMATED COUNT: 15.9 % (ref 11.6–15.1)
GFR SERPL CREATININE-BSD FRML MDRD: 25 ML/MIN/1.73SQ M
GFR SERPL CREATININE-BSD FRML MDRD: 25 ML/MIN/1.73SQ M
GFR SERPL CREATININE-BSD FRML MDRD: 27 ML/MIN/1.73SQ M
GFR SERPL CREATININE-BSD FRML MDRD: 27 ML/MIN/1.73SQ M
GLUCOSE SERPL-MCNC: 114 MG/DL (ref 65–140)
GLUCOSE SERPL-MCNC: 115 MG/DL (ref 65–140)
GLUCOSE SERPL-MCNC: 117 MG/DL (ref 65–140)
GLUCOSE SERPL-MCNC: 119 MG/DL (ref 65–140)
GLUCOSE SERPL-MCNC: 121 MG/DL (ref 65–140)
GLUCOSE SERPL-MCNC: 126 MG/DL (ref 65–140)
GLUCOSE SERPL-MCNC: 128 MG/DL (ref 65–140)
GLUCOSE SERPL-MCNC: 131 MG/DL (ref 65–140)
GLUCOSE SERPL-MCNC: 136 MG/DL (ref 65–140)
GLUCOSE SERPL-MCNC: 141 MG/DL (ref 65–140)
GLUCOSE SERPL-MCNC: 149 MG/DL (ref 65–140)
GLUCOSE SERPL-MCNC: 150 MG/DL (ref 65–140)
GLUCOSE SERPL-MCNC: 170 MG/DL (ref 65–140)
GLUCOSE SERPL-MCNC: 193 MG/DL (ref 65–140)
GLUCOSE SERPL-MCNC: 194 MG/DL (ref 65–140)
GLUCOSE SERPL-MCNC: 209 MG/DL (ref 65–140)
HCO3 BLDV-SCNC: 28.4 MMOL/L (ref 24–30)
HCT VFR BLD AUTO: 29.8 % (ref 34.8–46.1)
HGB BLD-MCNC: 9.5 G/DL (ref 11.5–15.4)
IPAP: 12
MAGNESIUM SERPL-MCNC: 2.2 MG/DL (ref 1.6–2.6)
MCH RBC QN AUTO: 27.6 PG (ref 26.8–34.3)
MCHC RBC AUTO-ENTMCNC: 31.9 G/DL (ref 31.4–37.4)
MCV RBC AUTO: 87 FL (ref 82–98)
NON VENT- BIPAP: ABNORMAL
O2 CT BLDV-SCNC: 8.9 ML/DL
PCO2 BLDV: 41.6 MM HG (ref 42–50)
PEEP MAX SETTING VENT: 11 CM[H2O]
PH BLDV: 7.45 [PH] (ref 7.3–7.4)
PHOSPHATE SERPL-MCNC: 6.5 MG/DL (ref 2.7–4.5)
PLATELET # BLD AUTO: 236 THOUSANDS/UL (ref 149–390)
PMV BLD AUTO: 11 FL (ref 8.9–12.7)
PO2 BLDV: 34.7 MM HG (ref 35–45)
POTASSIUM SERPL-SCNC: 2.8 MMOL/L (ref 3.5–5.3)
POTASSIUM SERPL-SCNC: 3.3 MMOL/L (ref 3.5–5.3)
POTASSIUM SERPL-SCNC: 3.3 MMOL/L (ref 3.5–5.3)
POTASSIUM SERPL-SCNC: 6.8 MMOL/L (ref 3.5–5.3)
PROT SERPL-MCNC: 6.6 G/DL (ref 6.4–8.2)
QRS AXIS: 44 DEGREES
QRSD INTERVAL: 96 MS
QT INTERVAL: 452 MS
QTC INTERVAL: 528 MS
RBC # BLD AUTO: 3.44 MILLION/UL (ref 3.81–5.12)
SODIUM SERPL-SCNC: 133 MMOL/L (ref 136–145)
SODIUM SERPL-SCNC: 134 MMOL/L (ref 136–145)
SODIUM SERPL-SCNC: 135 MMOL/L (ref 136–145)
SODIUM SERPL-SCNC: 137 MMOL/L (ref 136–145)
T WAVE AXIS: 47 DEGREES
VENTRICULAR RATE: 82 BPM
WBC # BLD AUTO: 17.41 THOUSAND/UL (ref 4.31–10.16)

## 2021-10-25 PROCEDURE — 99232 SBSQ HOSP IP/OBS MODERATE 35: CPT | Performed by: INTERNAL MEDICINE

## 2021-10-25 PROCEDURE — 99291 CRITICAL CARE FIRST HOUR: CPT | Performed by: PHYSICIAN ASSISTANT

## 2021-10-25 PROCEDURE — 82948 REAGENT STRIP/BLOOD GLUCOSE: CPT

## 2021-10-25 PROCEDURE — 80048 BASIC METABOLIC PNL TOTAL CA: CPT | Performed by: ANESTHESIOLOGY

## 2021-10-25 PROCEDURE — 80076 HEPATIC FUNCTION PANEL: CPT | Performed by: PHYSICIAN ASSISTANT

## 2021-10-25 PROCEDURE — 99233 SBSQ HOSP IP/OBS HIGH 50: CPT | Performed by: INTERNAL MEDICINE

## 2021-10-25 PROCEDURE — 93010 ELECTROCARDIOGRAM REPORT: CPT | Performed by: INTERNAL MEDICINE

## 2021-10-25 PROCEDURE — 84100 ASSAY OF PHOSPHORUS: CPT | Performed by: PHYSICIAN ASSISTANT

## 2021-10-25 PROCEDURE — 82805 BLOOD GASES W/O2 SATURATION: CPT | Performed by: PHYSICIAN ASSISTANT

## 2021-10-25 PROCEDURE — 80048 BASIC METABOLIC PNL TOTAL CA: CPT | Performed by: PHYSICIAN ASSISTANT

## 2021-10-25 PROCEDURE — 94760 N-INVAS EAR/PLS OXIMETRY 1: CPT

## 2021-10-25 PROCEDURE — 85027 COMPLETE CBC AUTOMATED: CPT | Performed by: PHYSICIAN ASSISTANT

## 2021-10-25 PROCEDURE — 82330 ASSAY OF CALCIUM: CPT | Performed by: PHYSICIAN ASSISTANT

## 2021-10-25 PROCEDURE — 83735 ASSAY OF MAGNESIUM: CPT | Performed by: PHYSICIAN ASSISTANT

## 2021-10-25 RX ORDER — POTASSIUM CHLORIDE 14.9 MG/ML
20 INJECTION INTRAVENOUS ONCE
Status: COMPLETED | OUTPATIENT
Start: 2021-10-25 | End: 2021-10-26

## 2021-10-25 RX ORDER — INSULIN GLARGINE 100 [IU]/ML
40 INJECTION, SOLUTION SUBCUTANEOUS
Status: DISCONTINUED | OUTPATIENT
Start: 2021-10-25 | End: 2021-10-25

## 2021-10-25 RX ORDER — POTASSIUM CHLORIDE 20 MEQ/1
20 TABLET, EXTENDED RELEASE ORAL
Status: DISCONTINUED | OUTPATIENT
Start: 2021-10-26 | End: 2021-10-26

## 2021-10-25 RX ORDER — POTASSIUM CHLORIDE 20 MEQ/1
40 TABLET, EXTENDED RELEASE ORAL ONCE
Status: COMPLETED | OUTPATIENT
Start: 2021-10-25 | End: 2021-10-25

## 2021-10-25 RX ORDER — POTASSIUM CHLORIDE 29.8 MG/ML
40 INJECTION INTRAVENOUS ONCE
Status: COMPLETED | OUTPATIENT
Start: 2021-10-25 | End: 2021-10-26

## 2021-10-25 RX ORDER — MAGNESIUM SULFATE HEPTAHYDRATE 40 MG/ML
2 INJECTION, SOLUTION INTRAVENOUS ONCE
Status: COMPLETED | OUTPATIENT
Start: 2021-10-25 | End: 2021-10-26

## 2021-10-25 RX ORDER — POTASSIUM CHLORIDE 20 MEQ/1
40 TABLET, EXTENDED RELEASE ORAL ONCE
Status: DISCONTINUED | OUTPATIENT
Start: 2021-10-25 | End: 2021-10-26

## 2021-10-25 RX ORDER — HYDRALAZINE HYDROCHLORIDE 50 MG/1
50 TABLET, FILM COATED ORAL EVERY 8 HOURS SCHEDULED
Status: DISCONTINUED | OUTPATIENT
Start: 2021-10-25 | End: 2021-10-26

## 2021-10-25 RX ORDER — CALCIUM GLUCONATE 20 MG/ML
2 INJECTION, SOLUTION INTRAVENOUS ONCE
Status: COMPLETED | OUTPATIENT
Start: 2021-10-25 | End: 2021-10-26

## 2021-10-25 RX ORDER — ISOSORBIDE DINITRATE 20 MG/1
20 TABLET ORAL
Status: DISCONTINUED | OUTPATIENT
Start: 2021-10-25 | End: 2021-10-28

## 2021-10-25 RX ADMIN — HEPARIN SODIUM 5000 UNITS: 5000 INJECTION INTRAVENOUS; SUBCUTANEOUS at 06:09

## 2021-10-25 RX ADMIN — POTASSIUM CHLORIDE 20 MEQ: 14.9 INJECTION, SOLUTION INTRAVENOUS at 15:57

## 2021-10-25 RX ADMIN — POTASSIUM CHLORIDE 40 MEQ: 1500 TABLET, EXTENDED RELEASE ORAL at 15:57

## 2021-10-25 RX ADMIN — DICYCLOMINE HYDROCHLORIDE 20 MG: 20 TABLET ORAL at 00:38

## 2021-10-25 RX ADMIN — POTASSIUM CHLORIDE 40 MEQ: 29.8 INJECTION, SOLUTION INTRAVENOUS at 22:40

## 2021-10-25 RX ADMIN — TICAGRELOR 90 MG: 90 TABLET ORAL at 20:50

## 2021-10-25 RX ADMIN — HYDRALAZINE HYDROCHLORIDE 25 MG: 25 TABLET ORAL at 14:17

## 2021-10-25 RX ADMIN — DICYCLOMINE HYDROCHLORIDE 20 MG: 20 TABLET ORAL at 22:40

## 2021-10-25 RX ADMIN — POTASSIUM CHLORIDE 20 MEQ: 14.9 INJECTION, SOLUTION INTRAVENOUS at 12:21

## 2021-10-25 RX ADMIN — ISOSORBIDE DINITRATE 20 MG: 20 TABLET ORAL at 12:19

## 2021-10-25 RX ADMIN — ATORVASTATIN CALCIUM 40 MG: 40 TABLET, FILM COATED ORAL at 17:31

## 2021-10-25 RX ADMIN — HEPARIN SODIUM 5000 UNITS: 5000 INJECTION INTRAVENOUS; SUBCUTANEOUS at 20:51

## 2021-10-25 RX ADMIN — ASPIRIN 81 MG CHEWABLE TABLET 81 MG: 81 TABLET CHEWABLE at 08:57

## 2021-10-25 RX ADMIN — CALCIUM GLUCONATE 2 G: 20 INJECTION, SOLUTION INTRAVENOUS at 10:30

## 2021-10-25 RX ADMIN — SODIUM CHLORIDE 6 UNITS/HR: 9 INJECTION, SOLUTION INTRAVENOUS at 18:02

## 2021-10-25 RX ADMIN — HYDRALAZINE HYDROCHLORIDE 25 MG: 25 TABLET ORAL at 06:09

## 2021-10-25 RX ADMIN — DICYCLOMINE HYDROCHLORIDE 20 MG: 20 TABLET ORAL at 12:19

## 2021-10-25 RX ADMIN — ISOSORBIDE DINITRATE 20 MG: 20 TABLET ORAL at 17:31

## 2021-10-25 RX ADMIN — HEPARIN SODIUM 5000 UNITS: 5000 INJECTION INTRAVENOUS; SUBCUTANEOUS at 14:17

## 2021-10-25 RX ADMIN — MAGNESIUM SULFATE HEPTAHYDRATE 2 G: 40 INJECTION, SOLUTION INTRAVENOUS at 09:06

## 2021-10-25 RX ADMIN — TICAGRELOR 90 MG: 90 TABLET ORAL at 08:58

## 2021-10-25 RX ADMIN — POTASSIUM CHLORIDE 40 MEQ: 29.8 INJECTION, SOLUTION INTRAVENOUS at 09:08

## 2021-10-25 RX ADMIN — HYDRALAZINE HYDROCHLORIDE 50 MG: 50 TABLET, FILM COATED ORAL at 22:40

## 2021-10-25 RX ADMIN — POTASSIUM CHLORIDE 40 MEQ: 1500 TABLET, EXTENDED RELEASE ORAL at 08:58

## 2021-10-25 RX ADMIN — ISOSORBIDE DINITRATE 10 MG: 10 TABLET ORAL at 08:59

## 2021-10-25 RX ADMIN — MILRINONE LACTATE IN DEXTROSE 0.38 MCG/KG/MIN: 200 INJECTION, SOLUTION INTRAVENOUS at 06:26

## 2021-10-25 RX ADMIN — Medication 40 MG/HR: at 09:59

## 2021-10-25 RX ADMIN — PANTOPRAZOLE SODIUM 40 MG: 40 TABLET, DELAYED RELEASE ORAL at 06:09

## 2021-10-25 RX ADMIN — MILRINONE LACTATE IN DEXTROSE 0.38 MCG/KG/MIN: 200 INJECTION, SOLUTION INTRAVENOUS at 16:24

## 2021-10-25 RX ADMIN — DICYCLOMINE HYDROCHLORIDE 20 MG: 20 TABLET ORAL at 17:32

## 2021-10-26 LAB
ALBUMIN SERPL BCP-MCNC: 2.3 G/DL (ref 3.5–5)
ALP SERPL-CCNC: 130 U/L (ref 46–116)
ALT SERPL W P-5'-P-CCNC: 257 U/L (ref 12–78)
ANION GAP SERPL CALCULATED.3IONS-SCNC: 7 MMOL/L (ref 4–13)
ANION GAP SERPL CALCULATED.3IONS-SCNC: 9 MMOL/L (ref 4–13)
ANION GAP SERPL CALCULATED.3IONS-SCNC: 9 MMOL/L (ref 4–13)
AST SERPL W P-5'-P-CCNC: 85 U/L (ref 5–45)
ATRIAL RATE: 59 BPM
ATRIAL RATE: 98 BPM
BASE EX.OXY STD BLDV CALC-SCNC: 54 % (ref 60–80)
BASE EX.OXY STD BLDV CALC-SCNC: 68.7 % (ref 60–80)
BASE EXCESS BLDV CALC-SCNC: 6.6 MMOL/L
BASE EXCESS BLDV CALC-SCNC: 7.9 MMOL/L
BILIRUB DIRECT SERPL-MCNC: 0.33 MG/DL (ref 0–0.2)
BILIRUB SERPL-MCNC: 0.96 MG/DL (ref 0.2–1)
BUN SERPL-MCNC: 44 MG/DL (ref 5–25)
BUN SERPL-MCNC: 45 MG/DL (ref 5–25)
BUN SERPL-MCNC: 49 MG/DL (ref 5–25)
BUN SERPL-MCNC: 49 MG/DL (ref 5–25)
BUN SERPL-MCNC: 50 MG/DL (ref 5–25)
CA-I BLD-SCNC: 1.03 MMOL/L (ref 1.12–1.32)
CALCIUM SERPL-MCNC: 9.1 MG/DL (ref 8.3–10.1)
CALCIUM SERPL-MCNC: 9.3 MG/DL (ref 8.3–10.1)
CALCIUM SERPL-MCNC: 9.5 MG/DL (ref 8.3–10.1)
CHLORIDE SERPL-SCNC: 93 MMOL/L (ref 100–108)
CHLORIDE SERPL-SCNC: 95 MMOL/L (ref 100–108)
CHLORIDE SERPL-SCNC: 96 MMOL/L (ref 100–108)
CO2 SERPL-SCNC: 28 MMOL/L (ref 21–32)
CO2 SERPL-SCNC: 29 MMOL/L (ref 21–32)
CO2 SERPL-SCNC: 30 MMOL/L (ref 21–32)
CO2 SERPL-SCNC: 32 MMOL/L (ref 21–32)
CO2 SERPL-SCNC: 33 MMOL/L (ref 21–32)
CREAT SERPL-MCNC: 1.99 MG/DL (ref 0.6–1.3)
CREAT SERPL-MCNC: 2.04 MG/DL (ref 0.6–1.3)
CREAT SERPL-MCNC: 2.13 MG/DL (ref 0.6–1.3)
CREAT SERPL-MCNC: 2.16 MG/DL (ref 0.6–1.3)
CREAT SERPL-MCNC: 2.16 MG/DL (ref 0.6–1.3)
ERYTHROCYTE [DISTWIDTH] IN BLOOD BY AUTOMATED COUNT: 15.7 % (ref 11.6–15.1)
GFR SERPL CREATININE-BSD FRML MDRD: 25 ML/MIN/1.73SQ M
GFR SERPL CREATININE-BSD FRML MDRD: 27 ML/MIN/1.73SQ M
GFR SERPL CREATININE-BSD FRML MDRD: 28 ML/MIN/1.73SQ M
GLUCOSE SERPL-MCNC: 124 MG/DL (ref 65–140)
GLUCOSE SERPL-MCNC: 124 MG/DL (ref 65–140)
GLUCOSE SERPL-MCNC: 126 MG/DL (ref 65–140)
GLUCOSE SERPL-MCNC: 128 MG/DL (ref 65–140)
GLUCOSE SERPL-MCNC: 139 MG/DL (ref 65–140)
GLUCOSE SERPL-MCNC: 140 MG/DL (ref 65–140)
GLUCOSE SERPL-MCNC: 162 MG/DL (ref 65–140)
GLUCOSE SERPL-MCNC: 188 MG/DL (ref 65–140)
GLUCOSE SERPL-MCNC: 193 MG/DL (ref 65–140)
GLUCOSE SERPL-MCNC: 194 MG/DL (ref 65–140)
GLUCOSE SERPL-MCNC: 195 MG/DL (ref 65–140)
GLUCOSE SERPL-MCNC: 207 MG/DL (ref 65–140)
GLUCOSE SERPL-MCNC: 215 MG/DL (ref 65–140)
GLUCOSE SERPL-MCNC: 68 MG/DL (ref 65–140)
GLUCOSE SERPL-MCNC: 71 MG/DL (ref 65–140)
GLUCOSE SERPL-MCNC: 90 MG/DL (ref 65–140)
HCO3 BLDV-SCNC: 31.3 MMOL/L (ref 24–30)
HCO3 BLDV-SCNC: 31.6 MMOL/L (ref 24–30)
HCT VFR BLD AUTO: 33.1 % (ref 34.8–46.1)
HGB BLD-MCNC: 10.8 G/DL (ref 11.5–15.4)
MAGNESIUM SERPL-MCNC: 2.5 MG/DL (ref 1.6–2.6)
MAGNESIUM SERPL-MCNC: 2.7 MG/DL (ref 1.6–2.6)
MCH RBC QN AUTO: 27.6 PG (ref 26.8–34.3)
MCHC RBC AUTO-ENTMCNC: 32.6 G/DL (ref 31.4–37.4)
MCV RBC AUTO: 85 FL (ref 82–98)
NON VENT- BIPAP: ABNORMAL
O2 CT BLDV-SCNC: 10.6 ML/DL
O2 CT BLDV-SCNC: 9.4 ML/DL
PCO2 BLDV: 40.7 MM HG (ref 42–50)
PCO2 BLDV: 45.9 MM HG (ref 42–50)
PH BLDV: 7.45 [PH] (ref 7.3–7.4)
PH BLDV: 7.51 [PH] (ref 7.3–7.4)
PHOSPHATE SERPL-MCNC: 5.7 MG/DL (ref 2.7–4.5)
PLATELET # BLD AUTO: 292 THOUSANDS/UL (ref 149–390)
PMV BLD AUTO: 11 FL (ref 8.9–12.7)
PO2 BLDV: 29.6 MM HG (ref 35–45)
PO2 BLDV: 39.6 MM HG (ref 35–45)
POTASSIUM SERPL-SCNC: 3.1 MMOL/L (ref 3.5–5.3)
POTASSIUM SERPL-SCNC: 3.1 MMOL/L (ref 3.5–5.3)
POTASSIUM SERPL-SCNC: 3.4 MMOL/L (ref 3.5–5.3)
POTASSIUM SERPL-SCNC: 3.9 MMOL/L (ref 3.5–5.3)
POTASSIUM SERPL-SCNC: 3.9 MMOL/L (ref 3.5–5.3)
PROT SERPL-MCNC: 7.3 G/DL (ref 6.4–8.2)
QRS AXIS: 39 DEGREES
QRS AXIS: 56 DEGREES
QRSD INTERVAL: 79 MS
QRSD INTERVAL: 96 MS
QT INTERVAL: 338 MS
QT INTERVAL: 513 MS
QTC INTERVAL: 509 MS
QTC INTERVAL: 607 MS
RBC # BLD AUTO: 3.91 MILLION/UL (ref 3.81–5.12)
SODIUM SERPL-SCNC: 130 MMOL/L (ref 136–145)
SODIUM SERPL-SCNC: 132 MMOL/L (ref 136–145)
SODIUM SERPL-SCNC: 134 MMOL/L (ref 136–145)
SODIUM SERPL-SCNC: 135 MMOL/L (ref 136–145)
SODIUM SERPL-SCNC: 136 MMOL/L (ref 136–145)
T WAVE AXIS: 20 DEGREES
T WAVE AXIS: 22 DEGREES
VENTRICULAR RATE: 194 BPM
VENTRICULAR RATE: 59 BPM
WBC # BLD AUTO: 14.52 THOUSAND/UL (ref 4.31–10.16)

## 2021-10-26 PROCEDURE — 80048 BASIC METABOLIC PNL TOTAL CA: CPT | Performed by: PHYSICIAN ASSISTANT

## 2021-10-26 PROCEDURE — NC001 PR NO CHARGE: Performed by: PHYSICIAN ASSISTANT

## 2021-10-26 PROCEDURE — 80076 HEPATIC FUNCTION PANEL: CPT | Performed by: PHYSICIAN ASSISTANT

## 2021-10-26 PROCEDURE — 80048 BASIC METABOLIC PNL TOTAL CA: CPT | Performed by: STUDENT IN AN ORGANIZED HEALTH CARE EDUCATION/TRAINING PROGRAM

## 2021-10-26 PROCEDURE — 85027 COMPLETE CBC AUTOMATED: CPT | Performed by: PHYSICIAN ASSISTANT

## 2021-10-26 PROCEDURE — 82330 ASSAY OF CALCIUM: CPT | Performed by: PHYSICIAN ASSISTANT

## 2021-10-26 PROCEDURE — 82948 REAGENT STRIP/BLOOD GLUCOSE: CPT

## 2021-10-26 PROCEDURE — 99232 SBSQ HOSP IP/OBS MODERATE 35: CPT | Performed by: INTERNAL MEDICINE

## 2021-10-26 PROCEDURE — 83735 ASSAY OF MAGNESIUM: CPT | Performed by: PHYSICIAN ASSISTANT

## 2021-10-26 PROCEDURE — 93010 ELECTROCARDIOGRAM REPORT: CPT | Performed by: INTERNAL MEDICINE

## 2021-10-26 PROCEDURE — 82805 BLOOD GASES W/O2 SATURATION: CPT | Performed by: STUDENT IN AN ORGANIZED HEALTH CARE EDUCATION/TRAINING PROGRAM

## 2021-10-26 PROCEDURE — 97167 OT EVAL HIGH COMPLEX 60 MIN: CPT

## 2021-10-26 PROCEDURE — 94760 N-INVAS EAR/PLS OXIMETRY 1: CPT

## 2021-10-26 PROCEDURE — 99233 SBSQ HOSP IP/OBS HIGH 50: CPT | Performed by: INTERNAL MEDICINE

## 2021-10-26 PROCEDURE — 97163 PT EVAL HIGH COMPLEX 45 MIN: CPT

## 2021-10-26 PROCEDURE — 84100 ASSAY OF PHOSPHORUS: CPT | Performed by: PHYSICIAN ASSISTANT

## 2021-10-26 PROCEDURE — 94660 CPAP INITIATION&MGMT: CPT

## 2021-10-26 PROCEDURE — 82805 BLOOD GASES W/O2 SATURATION: CPT | Performed by: PHYSICIAN ASSISTANT

## 2021-10-26 RX ORDER — POTASSIUM CHLORIDE 20MEQ/15ML
20 LIQUID (ML) ORAL 3 TIMES DAILY
Status: DISCONTINUED | OUTPATIENT
Start: 2021-10-26 | End: 2021-10-29

## 2021-10-26 RX ORDER — POLYETHYLENE GLYCOL 3350 17 G/17G
17 POWDER, FOR SOLUTION ORAL DAILY PRN
Status: DISCONTINUED | OUTPATIENT
Start: 2021-10-26 | End: 2021-11-11

## 2021-10-26 RX ORDER — AMOXICILLIN 250 MG
1 CAPSULE ORAL 2 TIMES DAILY
Status: DISCONTINUED | OUTPATIENT
Start: 2021-10-26 | End: 2021-10-26

## 2021-10-26 RX ORDER — MECLIZINE HCL 12.5 MG/1
25 TABLET ORAL EVERY 8 HOURS PRN
Status: DISCONTINUED | OUTPATIENT
Start: 2021-10-26 | End: 2021-11-16

## 2021-10-26 RX ORDER — POTASSIUM CHLORIDE 14.9 MG/ML
20 INJECTION INTRAVENOUS ONCE
Status: COMPLETED | OUTPATIENT
Start: 2021-10-26 | End: 2021-10-26

## 2021-10-26 RX ORDER — AMIODARONE HYDROCHLORIDE 50 MG/ML
INJECTION, SOLUTION INTRAVENOUS
Status: COMPLETED
Start: 2021-10-26 | End: 2021-10-26

## 2021-10-26 RX ORDER — MAGNESIUM SULFATE HEPTAHYDRATE 40 MG/ML
2 INJECTION, SOLUTION INTRAVENOUS ONCE
Status: COMPLETED | OUTPATIENT
Start: 2021-10-26 | End: 2021-10-26

## 2021-10-26 RX ORDER — POTASSIUM CHLORIDE 20MEQ/15ML
40 LIQUID (ML) ORAL ONCE
Status: COMPLETED | OUTPATIENT
Start: 2021-10-26 | End: 2021-10-26

## 2021-10-26 RX ORDER — HYDRALAZINE HYDROCHLORIDE 50 MG/1
50 TABLET, FILM COATED ORAL EVERY 8 HOURS SCHEDULED
Status: DISCONTINUED | OUTPATIENT
Start: 2021-10-26 | End: 2021-10-27

## 2021-10-26 RX ORDER — POTASSIUM CHLORIDE 29.8 MG/ML
40 INJECTION INTRAVENOUS ONCE
Status: COMPLETED | OUTPATIENT
Start: 2021-10-26 | End: 2021-10-26

## 2021-10-26 RX ORDER — POTASSIUM CHLORIDE 20 MEQ/1
40 TABLET, EXTENDED RELEASE ORAL ONCE
Status: DISCONTINUED | OUTPATIENT
Start: 2021-10-26 | End: 2021-10-26

## 2021-10-26 RX ADMIN — POTASSIUM CHLORIDE 40 MEQ: 29.8 INJECTION, SOLUTION INTRAVENOUS at 10:15

## 2021-10-26 RX ADMIN — POTASSIUM CHLORIDE 40 MEQ: 20 SOLUTION ORAL at 10:15

## 2021-10-26 RX ADMIN — AMIODARONE HYDROCHLORIDE: 50 INJECTION, SOLUTION INTRAVENOUS at 19:44

## 2021-10-26 RX ADMIN — SODIUM CHLORIDE 1 UNITS/HR: 9 INJECTION, SOLUTION INTRAVENOUS at 16:30

## 2021-10-26 RX ADMIN — POTASSIUM CHLORIDE 20 MEQ: 14.9 INJECTION, SOLUTION INTRAVENOUS at 16:29

## 2021-10-26 RX ADMIN — TICAGRELOR 90 MG: 90 TABLET ORAL at 21:17

## 2021-10-26 RX ADMIN — HEPARIN SODIUM 5000 UNITS: 5000 INJECTION INTRAVENOUS; SUBCUTANEOUS at 13:37

## 2021-10-26 RX ADMIN — Medication 20 MG/HR: at 07:25

## 2021-10-26 RX ADMIN — Medication 40 MEQ: at 16:30

## 2021-10-26 RX ADMIN — ISOSORBIDE DINITRATE 20 MG: 20 TABLET ORAL at 13:38

## 2021-10-26 RX ADMIN — ATORVASTATIN CALCIUM 40 MG: 40 TABLET, FILM COATED ORAL at 16:29

## 2021-10-26 RX ADMIN — MECLIZINE HCL 12.5 MG 25 MG: 12.5 TABLET ORAL at 16:29

## 2021-10-26 RX ADMIN — DICYCLOMINE HYDROCHLORIDE 20 MG: 20 TABLET ORAL at 05:12

## 2021-10-26 RX ADMIN — MILRINONE LACTATE IN DEXTROSE 0.38 MCG/KG/MIN: 200 INJECTION, SOLUTION INTRAVENOUS at 01:33

## 2021-10-26 RX ADMIN — HEPARIN SODIUM 5000 UNITS: 5000 INJECTION INTRAVENOUS; SUBCUTANEOUS at 21:18

## 2021-10-26 RX ADMIN — HEPARIN SODIUM 5000 UNITS: 5000 INJECTION INTRAVENOUS; SUBCUTANEOUS at 05:13

## 2021-10-26 RX ADMIN — AMIODARONE HYDROCHLORIDE 1 MG/MIN: 50 INJECTION, SOLUTION INTRAVENOUS at 19:46

## 2021-10-26 RX ADMIN — MILRINONE LACTATE IN DEXTROSE 0.25 MCG/KG/MIN: 200 INJECTION, SOLUTION INTRAVENOUS at 13:37

## 2021-10-26 RX ADMIN — HYDRALAZINE HYDROCHLORIDE 50 MG: 50 TABLET ORAL at 21:17

## 2021-10-26 RX ADMIN — PANTOPRAZOLE SODIUM 40 MG: 40 TABLET, DELAYED RELEASE ORAL at 05:12

## 2021-10-26 RX ADMIN — DEXTROSE 150 MG: 50 INJECTION, SOLUTION INTRAVENOUS at 19:45

## 2021-10-26 RX ADMIN — Medication 20 MEQ: at 16:30

## 2021-10-26 RX ADMIN — TICAGRELOR 90 MG: 90 TABLET ORAL at 08:00

## 2021-10-26 RX ADMIN — Medication 20 MEQ: at 21:18

## 2021-10-26 RX ADMIN — DICYCLOMINE HYDROCHLORIDE 20 MG: 20 TABLET ORAL at 22:33

## 2021-10-26 RX ADMIN — ASPIRIN 81 MG CHEWABLE TABLET 81 MG: 81 TABLET CHEWABLE at 08:00

## 2021-10-26 RX ADMIN — HYDRALAZINE HYDROCHLORIDE 50 MG: 50 TABLET, FILM COATED ORAL at 05:12

## 2021-10-26 RX ADMIN — MILRINONE LACTATE IN DEXTROSE 0.38 MCG/KG/MIN: 200 INJECTION, SOLUTION INTRAVENOUS at 23:55

## 2021-10-26 RX ADMIN — POTASSIUM CHLORIDE 20 MEQ: 14.9 INJECTION, SOLUTION INTRAVENOUS at 20:20

## 2021-10-26 RX ADMIN — ISOSORBIDE DINITRATE 20 MG: 20 TABLET ORAL at 07:51

## 2021-10-26 RX ADMIN — MAGNESIUM SULFATE HEPTAHYDRATE 2 G: 40 INJECTION, SOLUTION INTRAVENOUS at 19:45

## 2021-10-26 RX ADMIN — HYDRALAZINE HYDROCHLORIDE 75 MG: 50 TABLET, FILM COATED ORAL at 13:37

## 2021-10-27 ENCOUNTER — APPOINTMENT (INPATIENT)
Dept: NON INVASIVE DIAGNOSTICS | Facility: HOSPITAL | Age: 56
DRG: 003 | End: 2021-10-27
Payer: MEDICARE

## 2021-10-27 PROBLEM — I47.2 VENTRICULAR TACHYCARDIA (HCC): Status: ACTIVE | Noted: 2021-10-27

## 2021-10-27 PROBLEM — I47.20 VENTRICULAR TACHYCARDIA: Status: ACTIVE | Noted: 2021-10-27

## 2021-10-27 LAB
ANION GAP SERPL CALCULATED.3IONS-SCNC: 10 MMOL/L (ref 4–13)
ANION GAP SERPL CALCULATED.3IONS-SCNC: 5 MMOL/L (ref 4–13)
ANION GAP SERPL CALCULATED.3IONS-SCNC: 8 MMOL/L (ref 4–13)
APICAL FOUR CHAMBER EJECTION FRACTION: 47 %
APTT PPP: 32 SECONDS (ref 23–37)
ATRIAL RATE: 77 BPM
BASE EX.OXY STD BLDV CALC-SCNC: 55 % (ref 60–80)
BASE EX.OXY STD BLDV CALC-SCNC: 56.5 % (ref 60–80)
BASE EXCESS BLDV CALC-SCNC: 3.6 MMOL/L
BASE EXCESS BLDV CALC-SCNC: 4.6 MMOL/L
BUN SERPL-MCNC: 53 MG/DL (ref 5–25)
BUN SERPL-MCNC: 54 MG/DL (ref 5–25)
BUN SERPL-MCNC: 55 MG/DL (ref 5–25)
CALCIUM SERPL-MCNC: 8.1 MG/DL (ref 8.3–10.1)
CALCIUM SERPL-MCNC: 8.4 MG/DL (ref 8.3–10.1)
CALCIUM SERPL-MCNC: 8.7 MG/DL (ref 8.3–10.1)
CHLORIDE SERPL-SCNC: 93 MMOL/L (ref 100–108)
CHLORIDE SERPL-SCNC: 96 MMOL/L (ref 100–108)
CHLORIDE SERPL-SCNC: 97 MMOL/L (ref 100–108)
CO2 SERPL-SCNC: 28 MMOL/L (ref 21–32)
CO2 SERPL-SCNC: 28 MMOL/L (ref 21–32)
CO2 SERPL-SCNC: 29 MMOL/L (ref 21–32)
CREAT SERPL-MCNC: 2.22 MG/DL (ref 0.6–1.3)
CREAT SERPL-MCNC: 2.37 MG/DL (ref 0.6–1.3)
CREAT SERPL-MCNC: 2.44 MG/DL (ref 0.6–1.3)
E WAVE DECELERATION TIME: 177 MS
ERYTHROCYTE [DISTWIDTH] IN BLOOD BY AUTOMATED COUNT: 15.5 % (ref 11.6–15.1)
GFR SERPL CREATININE-BSD FRML MDRD: 22 ML/MIN/1.73SQ M
GFR SERPL CREATININE-BSD FRML MDRD: 22 ML/MIN/1.73SQ M
GFR SERPL CREATININE-BSD FRML MDRD: 24 ML/MIN/1.73SQ M
GLUCOSE SERPL-MCNC: 110 MG/DL (ref 65–140)
GLUCOSE SERPL-MCNC: 115 MG/DL (ref 65–140)
GLUCOSE SERPL-MCNC: 122 MG/DL (ref 65–140)
GLUCOSE SERPL-MCNC: 136 MG/DL (ref 65–140)
GLUCOSE SERPL-MCNC: 141 MG/DL (ref 65–140)
GLUCOSE SERPL-MCNC: 147 MG/DL (ref 65–140)
GLUCOSE SERPL-MCNC: 151 MG/DL (ref 65–140)
GLUCOSE SERPL-MCNC: 174 MG/DL (ref 65–140)
GLUCOSE SERPL-MCNC: 206 MG/DL (ref 65–140)
GLUCOSE SERPL-MCNC: 211 MG/DL (ref 65–140)
GLUCOSE SERPL-MCNC: 215 MG/DL (ref 65–140)
GLUCOSE SERPL-MCNC: 232 MG/DL (ref 65–140)
GLUCOSE SERPL-MCNC: 259 MG/DL (ref 65–140)
HCO3 BLDV-SCNC: 27.8 MMOL/L (ref 24–30)
HCO3 BLDV-SCNC: 28.9 MMOL/L (ref 24–30)
HCT VFR BLD AUTO: 32.9 % (ref 34.8–46.1)
HGB BLD-MCNC: 10.6 G/DL (ref 11.5–15.4)
INR PPP: 1.05 (ref 0.84–1.19)
MAGNESIUM SERPL-MCNC: 3.3 MG/DL (ref 1.6–2.6)
MCH RBC QN AUTO: 27.2 PG (ref 26.8–34.3)
MCHC RBC AUTO-ENTMCNC: 32.2 G/DL (ref 31.4–37.4)
MCV RBC AUTO: 84 FL (ref 82–98)
MV PEAK A VEL: 0.46 M/S
MV PEAK E VEL: 76 CM/S
MV STENOSIS PRESSURE HALF TIME: 0 MS
NASAL CANNULA: 4
O2 CT BLDV-SCNC: 8.9 ML/DL
O2 CT BLDV-SCNC: 9.1 ML/DL
PCO2 BLDV: 40.5 MM HG (ref 42–50)
PCO2 BLDV: 41.5 MM HG (ref 42–50)
PH BLDV: 7.45 [PH] (ref 7.3–7.4)
PH BLDV: 7.46 [PH] (ref 7.3–7.4)
PLATELET # BLD AUTO: 311 THOUSANDS/UL (ref 149–390)
PMV BLD AUTO: 10.2 FL (ref 8.9–12.7)
PO2 BLDV: 31.7 MM HG (ref 35–45)
PO2 BLDV: 32.4 MM HG (ref 35–45)
POTASSIUM SERPL-SCNC: 2.9 MMOL/L (ref 3.5–5.3)
POTASSIUM SERPL-SCNC: 3.1 MMOL/L (ref 3.5–5.3)
POTASSIUM SERPL-SCNC: 4 MMOL/L (ref 3.5–5.3)
PR INTERVAL: 144 MS
PROTHROMBIN TIME: 13.3 SECONDS (ref 11.6–14.5)
QRS AXIS: 54 DEGREES
QRSD INTERVAL: 106 MS
QT INTERVAL: 434 MS
QTC INTERVAL: 491 MS
RBC # BLD AUTO: 3.9 MILLION/UL (ref 3.81–5.12)
SL CV LV EF: 50
SODIUM SERPL-SCNC: 130 MMOL/L (ref 136–145)
SODIUM SERPL-SCNC: 131 MMOL/L (ref 136–145)
SODIUM SERPL-SCNC: 133 MMOL/L (ref 136–145)
T WAVE AXIS: 56 DEGREES
TRICUSPID VALVE S': 0.8 CM/S
VENTRICULAR RATE: 77 BPM
WBC # BLD AUTO: 13.86 THOUSAND/UL (ref 4.31–10.16)

## 2021-10-27 PROCEDURE — 93010 ELECTROCARDIOGRAM REPORT: CPT | Performed by: INTERNAL MEDICINE

## 2021-10-27 PROCEDURE — 93308 TTE F-UP OR LMTD: CPT | Performed by: INTERNAL MEDICINE

## 2021-10-27 PROCEDURE — C1887 CATHETER, GUIDING: HCPCS | Performed by: INTERNAL MEDICINE

## 2021-10-27 PROCEDURE — 93458 L HRT ARTERY/VENTRICLE ANGIO: CPT | Performed by: INTERNAL MEDICINE

## 2021-10-27 PROCEDURE — 99291 CRITICAL CARE FIRST HOUR: CPT | Performed by: PHYSICIAN ASSISTANT

## 2021-10-27 PROCEDURE — C1769 GUIDE WIRE: HCPCS | Performed by: INTERNAL MEDICINE

## 2021-10-27 PROCEDURE — C1753 CATH, INTRAVAS ULTRASOUND: HCPCS | Performed by: INTERNAL MEDICINE

## 2021-10-27 PROCEDURE — 93321 DOPPLER ECHO F-UP/LMTD STD: CPT | Performed by: INTERNAL MEDICINE

## 2021-10-27 PROCEDURE — 83735 ASSAY OF MAGNESIUM: CPT | Performed by: PHYSICIAN ASSISTANT

## 2021-10-27 PROCEDURE — C1894 INTRO/SHEATH, NON-LASER: HCPCS | Performed by: INTERNAL MEDICINE

## 2021-10-27 PROCEDURE — 82805 BLOOD GASES W/O2 SATURATION: CPT | Performed by: STUDENT IN AN ORGANIZED HEALTH CARE EDUCATION/TRAINING PROGRAM

## 2021-10-27 PROCEDURE — 82948 REAGENT STRIP/BLOOD GLUCOSE: CPT

## 2021-10-27 PROCEDURE — 92950 HEART/LUNG RESUSCITATION CPR: CPT | Performed by: PHYSICIAN ASSISTANT

## 2021-10-27 PROCEDURE — 99233 SBSQ HOSP IP/OBS HIGH 50: CPT | Performed by: INTERNAL MEDICINE

## 2021-10-27 PROCEDURE — 94760 N-INVAS EAR/PLS OXIMETRY 1: CPT

## 2021-10-27 PROCEDURE — 0BH17EZ INSERTION OF ENDOTRACHEAL AIRWAY INTO TRACHEA, VIA NATURAL OR ARTIFICIAL OPENING: ICD-10-PCS | Performed by: ANESTHESIOLOGY

## 2021-10-27 PROCEDURE — 99152 MOD SED SAME PHYS/QHP 5/>YRS: CPT | Performed by: INTERNAL MEDICINE

## 2021-10-27 PROCEDURE — B2111ZZ FLUOROSCOPY OF MULTIPLE CORONARY ARTERIES USING LOW OSMOLAR CONTRAST: ICD-10-PCS | Performed by: INTERNAL MEDICINE

## 2021-10-27 PROCEDURE — 5A12012 PERFORMANCE OF CARDIAC OUTPUT, SINGLE, MANUAL: ICD-10-PCS | Performed by: ANESTHESIOLOGY

## 2021-10-27 PROCEDURE — 93454 CORONARY ARTERY ANGIO S&I: CPT | Performed by: INTERNAL MEDICINE

## 2021-10-27 PROCEDURE — C1725 CATH, TRANSLUMIN NON-LASER: HCPCS | Performed by: INTERNAL MEDICINE

## 2021-10-27 PROCEDURE — 85730 THROMBOPLASTIN TIME PARTIAL: CPT | Performed by: STUDENT IN AN ORGANIZED HEALTH CARE EDUCATION/TRAINING PROGRAM

## 2021-10-27 PROCEDURE — 82805 BLOOD GASES W/O2 SATURATION: CPT | Performed by: PHYSICIAN ASSISTANT

## 2021-10-27 PROCEDURE — 99222 1ST HOSP IP/OBS MODERATE 55: CPT | Performed by: INTERNAL MEDICINE

## 2021-10-27 PROCEDURE — 85027 COMPLETE CBC AUTOMATED: CPT | Performed by: PHYSICIAN ASSISTANT

## 2021-10-27 PROCEDURE — NC001 PR NO CHARGE: Performed by: PHYSICIAN ASSISTANT

## 2021-10-27 PROCEDURE — 80048 BASIC METABOLIC PNL TOTAL CA: CPT | Performed by: STUDENT IN AN ORGANIZED HEALTH CARE EDUCATION/TRAINING PROGRAM

## 2021-10-27 PROCEDURE — 99153 MOD SED SAME PHYS/QHP EA: CPT | Performed by: INTERNAL MEDICINE

## 2021-10-27 PROCEDURE — 93308 TTE F-UP OR LMTD: CPT

## 2021-10-27 PROCEDURE — 85610 PROTHROMBIN TIME: CPT | Performed by: STUDENT IN AN ORGANIZED HEALTH CARE EDUCATION/TRAINING PROGRAM

## 2021-10-27 PROCEDURE — 93325 DOPPLER ECHO COLOR FLOW MAPG: CPT | Performed by: INTERNAL MEDICINE

## 2021-10-27 PROCEDURE — 5A1955Z RESPIRATORY VENTILATION, GREATER THAN 96 CONSECUTIVE HOURS: ICD-10-PCS | Performed by: ANESTHESIOLOGY

## 2021-10-27 PROCEDURE — 94660 CPAP INITIATION&MGMT: CPT

## 2021-10-27 PROCEDURE — 92920 PRQ TRLUML C ANGIOP 1ART&/BR: CPT | Performed by: INTERNAL MEDICINE

## 2021-10-27 PROCEDURE — 4A023N7 MEASUREMENT OF CARDIAC SAMPLING AND PRESSURE, LEFT HEART, PERCUTANEOUS APPROACH: ICD-10-PCS | Performed by: INTERNAL MEDICINE

## 2021-10-27 PROCEDURE — 80048 BASIC METABOLIC PNL TOTAL CA: CPT | Performed by: PHYSICIAN ASSISTANT

## 2021-10-27 PROCEDURE — 93005 ELECTROCARDIOGRAM TRACING: CPT

## 2021-10-27 RX ORDER — LIDOCAINE HYDROCHLORIDE 20 MG/ML
INJECTION, SOLUTION EPIDURAL; INFILTRATION; INTRACAUDAL; PERINEURAL CODE/TRAUMA/SEDATION MEDICATION
Status: COMPLETED | OUTPATIENT
Start: 2021-10-27 | End: 2021-10-27

## 2021-10-27 RX ORDER — LIDOCAINE HYDROCHLORIDE ANHYDROUS AND DEXTROSE MONOHYDRATE .8; 5 G/100ML; G/100ML
1 INJECTION, SOLUTION INTRAVENOUS CONTINUOUS
Status: CANCELLED | OUTPATIENT
Start: 2021-10-27

## 2021-10-27 RX ORDER — MIDAZOLAM HYDROCHLORIDE 2 MG/2ML
INJECTION, SOLUTION INTRAMUSCULAR; INTRAVENOUS AS NEEDED
Status: DISCONTINUED | OUTPATIENT
Start: 2021-10-27 | End: 2021-10-27 | Stop reason: HOSPADM

## 2021-10-27 RX ORDER — POTASSIUM CHLORIDE 20MEQ/15ML
40 LIQUID (ML) ORAL ONCE
Status: COMPLETED | OUTPATIENT
Start: 2021-10-27 | End: 2021-10-27

## 2021-10-27 RX ORDER — NITROGLYCERIN 20 MG/100ML
INJECTION INTRAVENOUS AS NEEDED
Status: DISCONTINUED | OUTPATIENT
Start: 2021-10-27 | End: 2021-10-27 | Stop reason: HOSPADM

## 2021-10-27 RX ORDER — LIDOCAINE HYDROCHLORIDE ANHYDROUS AND DEXTROSE MONOHYDRATE .8; 5 G/100ML; G/100ML
INJECTION, SOLUTION INTRAVENOUS
Status: COMPLETED | OUTPATIENT
Start: 2021-10-27 | End: 2021-10-27

## 2021-10-27 RX ORDER — OXYCODONE HYDROCHLORIDE 5 MG/1
2.5 TABLET ORAL EVERY 4 HOURS PRN
Status: DISCONTINUED | OUTPATIENT
Start: 2021-10-27 | End: 2021-11-23 | Stop reason: HOSPADM

## 2021-10-27 RX ORDER — MAGNESIUM SULFATE HEPTAHYDRATE 40 MG/ML
2 INJECTION, SOLUTION INTRAVENOUS ONCE
Status: COMPLETED | OUTPATIENT
Start: 2021-10-27 | End: 2021-10-27

## 2021-10-27 RX ORDER — POTASSIUM CHLORIDE 29.8 MG/ML
40 INJECTION INTRAVENOUS ONCE
Status: COMPLETED | OUTPATIENT
Start: 2021-10-27 | End: 2021-10-27

## 2021-10-27 RX ORDER — FENTANYL CITRATE 50 UG/ML
INJECTION, SOLUTION INTRAMUSCULAR; INTRAVENOUS
Status: DISPENSED
Start: 2021-10-27 | End: 2021-10-27

## 2021-10-27 RX ORDER — MAGNESIUM SULFATE 500 MG/ML
VIAL (ML) INJECTION CODE/TRAUMA/SEDATION MEDICATION
Status: COMPLETED | OUTPATIENT
Start: 2021-10-27 | End: 2021-10-27

## 2021-10-27 RX ORDER — HEPARIN SODIUM 1000 [USP'U]/ML
INJECTION, SOLUTION INTRAVENOUS; SUBCUTANEOUS AS NEEDED
Status: DISCONTINUED | OUTPATIENT
Start: 2021-10-27 | End: 2021-10-27 | Stop reason: HOSPADM

## 2021-10-27 RX ORDER — HEPARIN SODIUM 10000 [USP'U]/100ML
3-20 INJECTION, SOLUTION INTRAVENOUS
Status: DISCONTINUED | OUTPATIENT
Start: 2021-10-27 | End: 2021-11-02

## 2021-10-27 RX ORDER — LIDOCAINE HYDROCHLORIDE 10 MG/ML
INJECTION, SOLUTION EPIDURAL; INFILTRATION; INTRACAUDAL; PERINEURAL AS NEEDED
Status: DISCONTINUED | OUTPATIENT
Start: 2021-10-27 | End: 2021-10-27 | Stop reason: HOSPADM

## 2021-10-27 RX ORDER — FENTANYL CITRATE 50 UG/ML
INJECTION, SOLUTION INTRAMUSCULAR; INTRAVENOUS CODE/TRAUMA/SEDATION MEDICATION
Status: COMPLETED | OUTPATIENT
Start: 2021-10-27 | End: 2021-10-27

## 2021-10-27 RX ORDER — HEPARIN SODIUM 1000 [USP'U]/ML
2000 INJECTION, SOLUTION INTRAVENOUS; SUBCUTANEOUS
Status: DISCONTINUED | OUTPATIENT
Start: 2021-10-27 | End: 2021-11-02

## 2021-10-27 RX ORDER — FENTANYL CITRATE 50 UG/ML
INJECTION, SOLUTION INTRAMUSCULAR; INTRAVENOUS AS NEEDED
Status: DISCONTINUED | OUTPATIENT
Start: 2021-10-27 | End: 2021-10-27 | Stop reason: HOSPADM

## 2021-10-27 RX ORDER — HEPARIN SODIUM 1000 [USP'U]/ML
4000 INJECTION, SOLUTION INTRAVENOUS; SUBCUTANEOUS
Status: DISCONTINUED | OUTPATIENT
Start: 2021-10-27 | End: 2021-11-02

## 2021-10-27 RX ORDER — POTASSIUM CHLORIDE 20 MEQ/1
40 TABLET, EXTENDED RELEASE ORAL ONCE
Status: DISCONTINUED | OUTPATIENT
Start: 2021-10-27 | End: 2021-10-27

## 2021-10-27 RX ORDER — OXYCODONE HYDROCHLORIDE 5 MG/1
5 TABLET ORAL EVERY 4 HOURS PRN
Status: DISCONTINUED | OUTPATIENT
Start: 2021-10-27 | End: 2021-11-23 | Stop reason: HOSPADM

## 2021-10-27 RX ORDER — LIDOCAINE HYDROCHLORIDE ANHYDROUS AND DEXTROSE MONOHYDRATE .8; 5 G/100ML; G/100ML
0.5 INJECTION, SOLUTION INTRAVENOUS CONTINUOUS
Status: DISCONTINUED | OUTPATIENT
Start: 2021-10-27 | End: 2021-11-01

## 2021-10-27 RX ADMIN — LIDOCAINE HYDROCHLORIDE 50 MG: 20 INJECTION, SOLUTION EPIDURAL; INFILTRATION; INTRACAUDAL; PERINEURAL at 04:15

## 2021-10-27 RX ADMIN — ACETAMINOPHEN 650 MG: 325 TABLET, FILM COATED ORAL at 11:05

## 2021-10-27 RX ADMIN — FENTANYL CITRATE 25 MCG: 50 INJECTION INTRAMUSCULAR; INTRAVENOUS at 04:34

## 2021-10-27 RX ADMIN — Medication 40 MEQ: at 16:05

## 2021-10-27 RX ADMIN — MILRINONE LACTATE IN DEXTROSE 0.25 MCG/KG/MIN: 200 INJECTION, SOLUTION INTRAVENOUS at 11:04

## 2021-10-27 RX ADMIN — HYDRALAZINE HYDROCHLORIDE 50 MG: 50 TABLET ORAL at 14:12

## 2021-10-27 RX ADMIN — Medication: at 07:02

## 2021-10-27 RX ADMIN — LIDOCAINE HYDROCHLORIDE 100 MG: 20 INJECTION, SOLUTION EPIDURAL; INFILTRATION; INTRACAUDAL; PERINEURAL at 04:29

## 2021-10-27 RX ADMIN — Medication 20 MEQ: at 11:05

## 2021-10-27 RX ADMIN — Medication 20 MEQ: at 16:55

## 2021-10-27 RX ADMIN — HEPARIN SODIUM 5000 UNITS: 5000 INJECTION INTRAVENOUS; SUBCUTANEOUS at 14:12

## 2021-10-27 RX ADMIN — ACETAMINOPHEN 650 MG: 325 TABLET, FILM COATED ORAL at 19:23

## 2021-10-27 RX ADMIN — DICYCLOMINE HYDROCHLORIDE 20 MG: 20 TABLET ORAL at 22:44

## 2021-10-27 RX ADMIN — FENTANYL CITRATE 25 MCG: 50 INJECTION INTRAMUSCULAR; INTRAVENOUS at 04:30

## 2021-10-27 RX ADMIN — POTASSIUM CHLORIDE 40 MEQ: 29.8 INJECTION, SOLUTION INTRAVENOUS at 16:01

## 2021-10-27 RX ADMIN — POTASSIUM CHLORIDE 40 MEQ: 29.8 INJECTION, SOLUTION INTRAVENOUS at 19:50

## 2021-10-27 RX ADMIN — LIDOCAINE HYDROCHLORIDE ANHYDROUS AND DEXTROSE MONOHYDRATE 1 MG/MIN: .8; 5 INJECTION, SOLUTION INTRAVENOUS at 05:06

## 2021-10-27 RX ADMIN — AMIODARONE HYDROCHLORIDE 1 MG/MIN: 50 INJECTION, SOLUTION INTRAVENOUS at 11:14

## 2021-10-27 RX ADMIN — Medication 20 MEQ: at 21:11

## 2021-10-27 RX ADMIN — Medication 10 MG/HR: at 19:51

## 2021-10-27 RX ADMIN — ISOSORBIDE DINITRATE 20 MG: 20 TABLET ORAL at 16:55

## 2021-10-27 RX ADMIN — ONDANSETRON 4 MG: 2 INJECTION INTRAMUSCULAR; INTRAVENOUS at 05:07

## 2021-10-27 RX ADMIN — HEPARIN SODIUM 5000 UNITS: 5000 INJECTION INTRAVENOUS; SUBCUTANEOUS at 06:14

## 2021-10-27 RX ADMIN — ASPIRIN 81 MG CHEWABLE TABLET 81 MG: 81 TABLET CHEWABLE at 08:38

## 2021-10-27 RX ADMIN — HEPARIN SODIUM 11.1 UNITS/KG/HR: 10000 INJECTION, SOLUTION INTRAVENOUS at 18:18

## 2021-10-27 RX ADMIN — MAGNESIUM SULFATE HEPTAHYDRATE 1 G: 500 INJECTION, SOLUTION INTRAMUSCULAR; INTRAVENOUS at 04:25

## 2021-10-27 RX ADMIN — LIDOCAINE HYDROCHLORIDE: 20 INJECTION INTRAVENOUS at 07:02

## 2021-10-27 RX ADMIN — HYDRALAZINE HYDROCHLORIDE 75 MG: 50 TABLET ORAL at 21:10

## 2021-10-27 RX ADMIN — ISOSORBIDE DINITRATE 20 MG: 20 TABLET ORAL at 13:11

## 2021-10-27 RX ADMIN — POTASSIUM CHLORIDE 40 MEQ: 29.8 INJECTION, SOLUTION INTRAVENOUS at 06:14

## 2021-10-27 RX ADMIN — SODIUM CHLORIDE 4 UNITS/HR: 9 INJECTION, SOLUTION INTRAVENOUS at 11:04

## 2021-10-27 RX ADMIN — TICAGRELOR 90 MG: 90 TABLET ORAL at 21:11

## 2021-10-27 RX ADMIN — LIDOCAINE HYDROCHLORIDE ANHYDROUS AND DEXTROSE MONOHYDRATE 1 MG/MIN: .8; 5 INJECTION, SOLUTION INTRAVENOUS at 18:20

## 2021-10-27 RX ADMIN — ATORVASTATIN CALCIUM 40 MG: 40 TABLET, FILM COATED ORAL at 16:05

## 2021-10-27 RX ADMIN — LIDOCAINE HYDROCHLORIDE ANHYDROUS AND DEXTROSE MONOHYDRATE 1 MG/MIN: .8; 5 INJECTION, SOLUTION INTRAVENOUS at 04:31

## 2021-10-27 RX ADMIN — MAGNESIUM SULFATE HEPTAHYDRATE 2 G: 40 INJECTION, SOLUTION INTRAVENOUS at 05:06

## 2021-10-27 RX ADMIN — TICAGRELOR 90 MG: 90 TABLET ORAL at 08:39

## 2021-10-28 ENCOUNTER — APPOINTMENT (INPATIENT)
Dept: RADIOLOGY | Facility: HOSPITAL | Age: 56
DRG: 003 | End: 2021-10-28
Payer: MEDICARE

## 2021-10-28 LAB
ANION GAP SERPL CALCULATED.3IONS-SCNC: 6 MMOL/L (ref 4–13)
ANION GAP SERPL CALCULATED.3IONS-SCNC: 7 MMOL/L (ref 4–13)
ANION GAP SERPL CALCULATED.3IONS-SCNC: 7 MMOL/L (ref 4–13)
ANISOCYTOSIS BLD QL SMEAR: PRESENT
APTT PPP: 49 SECONDS (ref 23–37)
APTT PPP: 65 SECONDS (ref 23–37)
ARTERIAL PATENCY WRIST A: 11
ARTIFACT: PRESENT
BASE EX.OXY STD BLDV CALC-SCNC: 57.2 % (ref 60–80)
BASE EX.OXY STD BLDV CALC-SCNC: 57.7 % (ref 60–80)
BASE EX.OXY STD BLDV CALC-SCNC: 64.5 % (ref 60–80)
BASE EXCESS BLDA CALC-SCNC: 1 MMOL/L (ref -2–3)
BASE EXCESS BLDV CALC-SCNC: 1.8 MMOL/L
BASE EXCESS BLDV CALC-SCNC: 3.1 MMOL/L
BASE EXCESS BLDV CALC-SCNC: 3.8 MMOL/L
BASOPHILS # BLD AUTO: 0.03 THOUSANDS/ΜL (ref 0–0.1)
BASOPHILS # BLD MANUAL: 0 THOUSAND/UL (ref 0–0.1)
BASOPHILS NFR BLD AUTO: 0 % (ref 0–1)
BASOPHILS NFR MAR MANUAL: 0 % (ref 0–1)
BUN SERPL-MCNC: 53 MG/DL (ref 5–25)
BUN SERPL-MCNC: 53 MG/DL (ref 5–25)
BUN SERPL-MCNC: 54 MG/DL (ref 5–25)
CALCIUM SERPL-MCNC: 7.9 MG/DL (ref 8.3–10.1)
CALCIUM SERPL-MCNC: 8 MG/DL (ref 8.3–10.1)
CALCIUM SERPL-MCNC: 8.2 MG/DL (ref 8.3–10.1)
CHLORIDE SERPL-SCNC: 94 MMOL/L (ref 100–108)
CHLORIDE SERPL-SCNC: 96 MMOL/L (ref 100–108)
CHLORIDE SERPL-SCNC: 97 MMOL/L (ref 100–108)
CO2 SERPL-SCNC: 27 MMOL/L (ref 21–32)
CREAT SERPL-MCNC: 2.22 MG/DL (ref 0.6–1.3)
CREAT SERPL-MCNC: 2.36 MG/DL (ref 0.6–1.3)
CREAT SERPL-MCNC: 2.39 MG/DL (ref 0.6–1.3)
EOSINOPHIL # BLD AUTO: 0.14 THOUSAND/ΜL (ref 0–0.61)
EOSINOPHIL # BLD MANUAL: 0.18 THOUSAND/UL (ref 0–0.4)
EOSINOPHIL NFR BLD AUTO: 1 % (ref 0–6)
EOSINOPHIL NFR BLD MANUAL: 1 % (ref 0–6)
ERYTHROCYTE [DISTWIDTH] IN BLOOD BY AUTOMATED COUNT: 15.6 % (ref 11.6–15.1)
ERYTHROCYTE [DISTWIDTH] IN BLOOD BY AUTOMATED COUNT: 15.6 % (ref 11.6–15.1)
GFR SERPL CREATININE-BSD FRML MDRD: 22 ML/MIN/1.73SQ M
GFR SERPL CREATININE-BSD FRML MDRD: 23 ML/MIN/1.73SQ M
GFR SERPL CREATININE-BSD FRML MDRD: 24 ML/MIN/1.73SQ M
GLUCOSE SERPL-MCNC: 113 MG/DL (ref 65–140)
GLUCOSE SERPL-MCNC: 115 MG/DL (ref 65–140)
GLUCOSE SERPL-MCNC: 118 MG/DL (ref 65–140)
GLUCOSE SERPL-MCNC: 120 MG/DL (ref 65–140)
GLUCOSE SERPL-MCNC: 120 MG/DL (ref 65–140)
GLUCOSE SERPL-MCNC: 122 MG/DL (ref 65–140)
GLUCOSE SERPL-MCNC: 123 MG/DL (ref 65–140)
GLUCOSE SERPL-MCNC: 131 MG/DL (ref 65–140)
GLUCOSE SERPL-MCNC: 135 MG/DL (ref 65–140)
GLUCOSE SERPL-MCNC: 137 MG/DL (ref 65–140)
GLUCOSE SERPL-MCNC: 141 MG/DL (ref 65–140)
GLUCOSE SERPL-MCNC: 149 MG/DL (ref 65–140)
GLUCOSE SERPL-MCNC: 185 MG/DL (ref 65–140)
GLUCOSE SERPL-MCNC: 199 MG/DL (ref 65–140)
GLUCOSE SERPL-MCNC: 209 MG/DL (ref 65–140)
GLUCOSE SERPL-MCNC: 58 MG/DL (ref 65–140)
GLUCOSE SERPL-MCNC: 65 MG/DL (ref 65–140)
HCO3 BLDA-SCNC: 25.9 MMOL/L (ref 22–28)
HCO3 BLDV-SCNC: 27.1 MMOL/L (ref 24–30)
HCO3 BLDV-SCNC: 28 MMOL/L (ref 24–30)
HCO3 BLDV-SCNC: 29.8 MMOL/L (ref 24–30)
HCT VFR BLD AUTO: 29.4 % (ref 34.8–46.1)
HCT VFR BLD AUTO: 30.1 % (ref 34.8–46.1)
HCT VFR BLD CALC: 33 % (ref 34.8–46.1)
HGB BLD-MCNC: 9.5 G/DL (ref 11.5–15.4)
HGB BLD-MCNC: 9.6 G/DL (ref 11.5–15.4)
HGB BLDA-MCNC: 11.2 G/DL (ref 11.5–15.4)
HOROWITZ INDEX BLDA+IHG-RTO: 60 MM[HG]
IMM GRANULOCYTES # BLD AUTO: 0.26 THOUSAND/UL (ref 0–0.2)
IMM GRANULOCYTES NFR BLD AUTO: 1 % (ref 0–2)
INR PPP: 1.06 (ref 0.84–1.19)
LACTATE SERPL-SCNC: 0.9 MMOL/L (ref 0.5–2)
LYMPHOCYTES # BLD AUTO: 11 % (ref 14–44)
LYMPHOCYTES # BLD AUTO: 2.01 THOUSAND/UL (ref 0.6–4.47)
LYMPHOCYTES # BLD AUTO: 2.37 THOUSANDS/ΜL (ref 0.6–4.47)
LYMPHOCYTES NFR BLD AUTO: 13 % (ref 14–44)
MAGNESIUM SERPL-MCNC: 4.9 MG/DL (ref 1.6–2.6)
MCH RBC QN AUTO: 26.9 PG (ref 26.8–34.3)
MCH RBC QN AUTO: 27.3 PG (ref 26.8–34.3)
MCHC RBC AUTO-ENTMCNC: 31.6 G/DL (ref 31.4–37.4)
MCHC RBC AUTO-ENTMCNC: 32.7 G/DL (ref 31.4–37.4)
MCV RBC AUTO: 84 FL (ref 82–98)
MCV RBC AUTO: 85 FL (ref 82–98)
MONOCYTES # BLD AUTO: 1.46 THOUSAND/UL (ref 0–1.22)
MONOCYTES # BLD AUTO: 1.69 THOUSAND/ΜL (ref 0.17–1.22)
MONOCYTES NFR BLD AUTO: 9 % (ref 4–12)
MONOCYTES NFR BLD: 8 % (ref 4–12)
NEUTROPHILS # BLD AUTO: 13.59 THOUSANDS/ΜL (ref 1.85–7.62)
NEUTROPHILS # BLD MANUAL: 14.62 THOUSAND/UL (ref 1.85–7.62)
NEUTS SEG NFR BLD AUTO: 76 % (ref 43–75)
NEUTS SEG NFR BLD AUTO: 80 % (ref 43–75)
NRBC BLD AUTO-RTO: 0 /100 WBCS
NRBC BLD AUTO-RTO: 1 /100 WBC (ref 0–2)
O2 CT BLDV-SCNC: 8.9 ML/DL
O2 CT BLDV-SCNC: 9.1 ML/DL
O2 CT BLDV-SCNC: 9.4 ML/DL
PCO2 BLD: 27 MMOL/L (ref 21–32)
PCO2 BLD: 41.7 MM HG (ref 36–44)
PCO2 BLDV: 44.5 MM HG (ref 42–50)
PCO2 BLDV: 45 MM HG (ref 42–50)
PCO2 BLDV: 51.9 MM HG (ref 42–50)
PEEP RESPIRATORY: 6 CM[H2O]
PH BLD: 7.4 [PH] (ref 7.35–7.45)
PH BLDV: 7.38 [PH] (ref 7.3–7.4)
PH BLDV: 7.4 [PH] (ref 7.3–7.4)
PH BLDV: 7.42 [PH] (ref 7.3–7.4)
PHOSPHATE SERPL-MCNC: 4.9 MG/DL (ref 2.7–4.5)
PLATELET # BLD AUTO: 324 THOUSANDS/UL (ref 149–390)
PLATELET # BLD AUTO: 383 THOUSANDS/UL (ref 149–390)
PLATELET BLD QL SMEAR: ADEQUATE
PMV BLD AUTO: 10.3 FL (ref 8.9–12.7)
PMV BLD AUTO: 10.3 FL (ref 8.9–12.7)
PO2 BLD: 79 MM HG (ref 75–129)
PO2 BLDV: 33.2 MM HG (ref 35–45)
PO2 BLDV: 34 MM HG (ref 35–45)
PO2 BLDV: 37.7 MM HG (ref 35–45)
POLYCHROMASIA BLD QL SMEAR: PRESENT
POTASSIUM BLD-SCNC: 4.4 MMOL/L (ref 3.5–5.3)
POTASSIUM SERPL-SCNC: 3.8 MMOL/L (ref 3.5–5.3)
POTASSIUM SERPL-SCNC: 4.4 MMOL/L (ref 3.5–5.3)
POTASSIUM SERPL-SCNC: 4.8 MMOL/L (ref 3.5–5.3)
PROTHROMBIN TIME: 13.4 SECONDS (ref 11.6–14.5)
PS CM H2O: 12
PS VENT FIO2: 80
PS VENT PEEP: 6
RBC # BLD AUTO: 3.52 MILLION/UL (ref 3.81–5.12)
RBC # BLD AUTO: 3.53 MILLION/UL (ref 3.81–5.12)
RBC MORPH BLD: PRESENT
SAO2 % BLD FROM PO2: 96 % (ref 60–85)
SODIUM BLD-SCNC: 132 MMOL/L (ref 136–145)
SODIUM SERPL-SCNC: 128 MMOL/L (ref 136–145)
SODIUM SERPL-SCNC: 130 MMOL/L (ref 136–145)
SODIUM SERPL-SCNC: 130 MMOL/L (ref 136–145)
SPECIMEN SOURCE: ABNORMAL
VENT - PS: ABNORMAL
VENT AC: 12
VENT- AC: AC
WBC # BLD AUTO: 18.08 THOUSAND/UL (ref 4.31–10.16)
WBC # BLD AUTO: 18.28 THOUSAND/UL (ref 4.31–10.16)

## 2021-10-28 PROCEDURE — 99152 MOD SED SAME PHYS/QHP 5/>YRS: CPT | Performed by: INTERNAL MEDICINE

## 2021-10-28 PROCEDURE — 82805 BLOOD GASES W/O2 SATURATION: CPT | Performed by: PHYSICIAN ASSISTANT

## 2021-10-28 PROCEDURE — 82948 REAGENT STRIP/BLOOD GLUCOSE: CPT

## 2021-10-28 PROCEDURE — 82810 BLOOD GASES O2 SAT ONLY: CPT | Performed by: INTERNAL MEDICINE

## 2021-10-28 PROCEDURE — 85730 THROMBOPLASTIN TIME PARTIAL: CPT | Performed by: INTERNAL MEDICINE

## 2021-10-28 PROCEDURE — 82947 ASSAY GLUCOSE BLOOD QUANT: CPT

## 2021-10-28 PROCEDURE — NC001 PR NO CHARGE: Performed by: INTERNAL MEDICINE

## 2021-10-28 PROCEDURE — 80048 BASIC METABOLIC PNL TOTAL CA: CPT | Performed by: PHYSICIAN ASSISTANT

## 2021-10-28 PROCEDURE — 94640 AIRWAY INHALATION TREATMENT: CPT

## 2021-10-28 PROCEDURE — 85610 PROTHROMBIN TIME: CPT | Performed by: STUDENT IN AN ORGANIZED HEALTH CARE EDUCATION/TRAINING PROGRAM

## 2021-10-28 PROCEDURE — 93561 HB CARDIAC OUTPUT MEASUREMENT: CPT | Performed by: INTERNAL MEDICINE

## 2021-10-28 PROCEDURE — 71045 X-RAY EXAM CHEST 1 VIEW: CPT

## 2021-10-28 PROCEDURE — 4A023N7 MEASUREMENT OF CARDIAC SAMPLING AND PRESSURE, LEFT HEART, PERCUTANEOUS APPROACH: ICD-10-PCS | Performed by: INTERNAL MEDICINE

## 2021-10-28 PROCEDURE — 84100 ASSAY OF PHOSPHORUS: CPT | Performed by: STUDENT IN AN ORGANIZED HEALTH CARE EDUCATION/TRAINING PROGRAM

## 2021-10-28 PROCEDURE — 94002 VENT MGMT INPAT INIT DAY: CPT

## 2021-10-28 PROCEDURE — C1751 CATH, INF, PER/CENT/MIDLINE: HCPCS | Performed by: INTERNAL MEDICINE

## 2021-10-28 PROCEDURE — 85007 BL SMEAR W/DIFF WBC COUNT: CPT | Performed by: STUDENT IN AN ORGANIZED HEALTH CARE EDUCATION/TRAINING PROGRAM

## 2021-10-28 PROCEDURE — 82805 BLOOD GASES W/O2 SATURATION: CPT | Performed by: INTERNAL MEDICINE

## 2021-10-28 PROCEDURE — 85730 THROMBOPLASTIN TIME PARTIAL: CPT | Performed by: PHYSICIAN ASSISTANT

## 2021-10-28 PROCEDURE — 93458 L HRT ARTERY/VENTRICLE ANGIO: CPT | Performed by: INTERNAL MEDICINE

## 2021-10-28 PROCEDURE — 99233 SBSQ HOSP IP/OBS HIGH 50: CPT | Performed by: INTERNAL MEDICINE

## 2021-10-28 PROCEDURE — C1769 GUIDE WIRE: HCPCS | Performed by: INTERNAL MEDICINE

## 2021-10-28 PROCEDURE — 83735 ASSAY OF MAGNESIUM: CPT | Performed by: PHYSICIAN ASSISTANT

## 2021-10-28 PROCEDURE — 82805 BLOOD GASES W/O2 SATURATION: CPT | Performed by: STUDENT IN AN ORGANIZED HEALTH CARE EDUCATION/TRAINING PROGRAM

## 2021-10-28 PROCEDURE — 31500 INSERT EMERGENCY AIRWAY: CPT | Performed by: INTERNAL MEDICINE

## 2021-10-28 PROCEDURE — C1894 INTRO/SHEATH, NON-LASER: HCPCS | Performed by: INTERNAL MEDICINE

## 2021-10-28 PROCEDURE — 93456 R HRT CORONARY ARTERY ANGIO: CPT | Performed by: INTERNAL MEDICINE

## 2021-10-28 PROCEDURE — 85027 COMPLETE CBC AUTOMATED: CPT | Performed by: STUDENT IN AN ORGANIZED HEALTH CARE EDUCATION/TRAINING PROGRAM

## 2021-10-28 PROCEDURE — 99291 CRITICAL CARE FIRST HOUR: CPT | Performed by: INTERNAL MEDICINE

## 2021-10-28 PROCEDURE — 93503 INSERT/PLACE HEART CATHETER: CPT | Performed by: INTERNAL MEDICINE

## 2021-10-28 PROCEDURE — 85014 HEMATOCRIT: CPT

## 2021-10-28 PROCEDURE — 83735 ASSAY OF MAGNESIUM: CPT | Performed by: STUDENT IN AN ORGANIZED HEALTH CARE EDUCATION/TRAINING PROGRAM

## 2021-10-28 PROCEDURE — 82803 BLOOD GASES ANY COMBINATION: CPT

## 2021-10-28 PROCEDURE — 92950 HEART/LUNG RESUSCITATION CPR: CPT | Performed by: INTERNAL MEDICINE

## 2021-10-28 PROCEDURE — 99232 SBSQ HOSP IP/OBS MODERATE 35: CPT | Performed by: INTERNAL MEDICINE

## 2021-10-28 PROCEDURE — 85025 COMPLETE CBC W/AUTO DIFF WBC: CPT | Performed by: INTERNAL MEDICINE

## 2021-10-28 PROCEDURE — 94760 N-INVAS EAR/PLS OXIMETRY 1: CPT

## 2021-10-28 PROCEDURE — 84295 ASSAY OF SERUM SODIUM: CPT

## 2021-10-28 PROCEDURE — 80176 ASSAY OF LIDOCAINE: CPT | Performed by: PHYSICIAN ASSISTANT

## 2021-10-28 PROCEDURE — 99292 CRITICAL CARE ADDL 30 MIN: CPT | Performed by: PHYSICIAN ASSISTANT

## 2021-10-28 PROCEDURE — 83605 ASSAY OF LACTIC ACID: CPT | Performed by: INTERNAL MEDICINE

## 2021-10-28 PROCEDURE — 84132 ASSAY OF SERUM POTASSIUM: CPT

## 2021-10-28 PROCEDURE — 99153 MOD SED SAME PHYS/QHP EA: CPT | Performed by: INTERNAL MEDICINE

## 2021-10-28 PROCEDURE — B2111ZZ FLUOROSCOPY OF MULTIPLE CORONARY ARTERIES USING LOW OSMOLAR CONTRAST: ICD-10-PCS | Performed by: INTERNAL MEDICINE

## 2021-10-28 RX ORDER — FENTANYL CITRATE-0.9 % NACL/PF 10 MCG/ML
50 PLASTIC BAG, INJECTION (ML) INTRAVENOUS CONTINUOUS
Status: DISCONTINUED | OUTPATIENT
Start: 2021-10-28 | End: 2021-11-04

## 2021-10-28 RX ORDER — LEVALBUTEROL 1.25 MG/.5ML
1.25 SOLUTION, CONCENTRATE RESPIRATORY (INHALATION)
Status: DISCONTINUED | OUTPATIENT
Start: 2021-10-28 | End: 2021-11-23 | Stop reason: HOSPADM

## 2021-10-28 RX ORDER — FENTANYL CITRATE 50 UG/ML
INJECTION, SOLUTION INTRAMUSCULAR; INTRAVENOUS CODE/TRAUMA/SEDATION MEDICATION
Status: COMPLETED | OUTPATIENT
Start: 2021-10-28 | End: 2021-10-28

## 2021-10-28 RX ORDER — CHLORHEXIDINE GLUCONATE 0.12 MG/ML
15 RINSE ORAL EVERY 12 HOURS SCHEDULED
Status: DISCONTINUED | OUTPATIENT
Start: 2021-10-28 | End: 2021-11-10

## 2021-10-28 RX ORDER — PROPOFOL 10 MG/ML
5-50 INJECTION, EMULSION INTRAVENOUS
Status: DISCONTINUED | OUTPATIENT
Start: 2021-10-28 | End: 2021-11-02

## 2021-10-28 RX ORDER — FENTANYL CITRATE 50 UG/ML
50 INJECTION, SOLUTION INTRAMUSCULAR; INTRAVENOUS
Status: DISCONTINUED | OUTPATIENT
Start: 2021-10-28 | End: 2021-11-05

## 2021-10-28 RX ORDER — ISOSORBIDE DINITRATE 10 MG/1
10 TABLET ORAL
Status: DISCONTINUED | OUTPATIENT
Start: 2021-10-29 | End: 2021-10-30

## 2021-10-28 RX ORDER — FENTANYL CITRATE 50 UG/ML
50 INJECTION, SOLUTION INTRAMUSCULAR; INTRAVENOUS
Status: DISCONTINUED | OUTPATIENT
Start: 2021-10-28 | End: 2021-10-28

## 2021-10-28 RX ORDER — MAGNESIUM SULFATE HEPTAHYDRATE 40 MG/ML
2 INJECTION, SOLUTION INTRAVENOUS ONCE
Status: COMPLETED | OUTPATIENT
Start: 2021-10-28 | End: 2021-10-28

## 2021-10-28 RX ORDER — ETOMIDATE 2 MG/ML
20 INJECTION INTRAVENOUS ONCE
Status: COMPLETED | OUTPATIENT
Start: 2021-10-28 | End: 2021-10-28

## 2021-10-28 RX ORDER — PROPOFOL 10 MG/ML
INJECTION, EMULSION INTRAVENOUS
Status: COMPLETED | OUTPATIENT
Start: 2021-10-28 | End: 2021-10-28

## 2021-10-28 RX ORDER — FENTANYL CITRATE-0.9 % NACL/PF 10 MCG/ML
PLASTIC BAG, INJECTION (ML) INTRAVENOUS
Status: COMPLETED
Start: 2021-10-28 | End: 2021-10-28

## 2021-10-28 RX ORDER — LIDOCAINE HYDROCHLORIDE 10 MG/ML
INJECTION, SOLUTION EPIDURAL; INFILTRATION; INTRACAUDAL; PERINEURAL AS NEEDED
Status: DISCONTINUED | OUTPATIENT
Start: 2021-10-28 | End: 2021-10-28 | Stop reason: HOSPADM

## 2021-10-28 RX ORDER — HYDRALAZINE HYDROCHLORIDE 25 MG/1
25 TABLET, FILM COATED ORAL EVERY 8 HOURS SCHEDULED
Status: DISCONTINUED | OUTPATIENT
Start: 2021-10-28 | End: 2021-10-28

## 2021-10-28 RX ORDER — POTASSIUM CHLORIDE 29.8 MG/ML
40 INJECTION INTRAVENOUS ONCE
Status: COMPLETED | OUTPATIENT
Start: 2021-10-28 | End: 2021-10-28

## 2021-10-28 RX ORDER — HYDRALAZINE HYDROCHLORIDE 25 MG/1
25 TABLET, FILM COATED ORAL EVERY 8 HOURS SCHEDULED
Status: DISCONTINUED | OUTPATIENT
Start: 2021-10-28 | End: 2021-10-29

## 2021-10-28 RX ORDER — FENTANYL CITRATE 50 UG/ML
INJECTION, SOLUTION INTRAMUSCULAR; INTRAVENOUS
Status: COMPLETED
Start: 2021-10-28 | End: 2021-10-28

## 2021-10-28 RX ORDER — ETOMIDATE 2 MG/ML
INJECTION INTRAVENOUS CODE/TRAUMA/SEDATION MEDICATION
Status: COMPLETED | OUTPATIENT
Start: 2021-10-28 | End: 2021-10-28

## 2021-10-28 RX ORDER — FUROSEMIDE 10 MG/ML
10 SYRINGE (ML) INJECTION CONTINUOUS
Status: DISCONTINUED | OUTPATIENT
Start: 2021-10-28 | End: 2021-11-03

## 2021-10-28 RX ORDER — DEXTROSE MONOHYDRATE 25 G/50ML
INJECTION, SOLUTION INTRAVENOUS
Status: COMPLETED
Start: 2021-10-28 | End: 2021-10-28

## 2021-10-28 RX ORDER — ACETAMINOPHEN 160 MG/5ML
650 SUSPENSION, ORAL (FINAL DOSE FORM) ORAL EVERY 6 HOURS PRN
Status: DISCONTINUED | OUTPATIENT
Start: 2021-10-28 | End: 2021-11-01

## 2021-10-28 RX ORDER — SUCCINYLCHOLINE/SOD CL,ISO/PF 100 MG/5ML
SYRINGE (ML) INTRAVENOUS CODE/TRAUMA/SEDATION MEDICATION
Status: COMPLETED | OUTPATIENT
Start: 2021-10-28 | End: 2021-10-28

## 2021-10-28 RX ORDER — FENTANYL CITRATE-0.9 % NACL/PF 10 MCG/ML
PLASTIC BAG, INJECTION (ML) INTRAVENOUS
Status: COMPLETED | OUTPATIENT
Start: 2021-10-28 | End: 2021-10-28

## 2021-10-28 RX ORDER — SUCCINYLCHOLINE/SOD CL,ISO/PF 100 MG/5ML
100 SYRINGE (ML) INTRAVENOUS ONCE
Status: COMPLETED | OUTPATIENT
Start: 2021-10-28 | End: 2021-10-28

## 2021-10-28 RX ORDER — ISOSORBIDE DINITRATE 10 MG/1
10 TABLET ORAL
Status: DISCONTINUED | OUTPATIENT
Start: 2021-10-28 | End: 2021-10-28

## 2021-10-28 RX ORDER — MAGNESIUM SULFATE 500 MG/ML
VIAL (ML) INJECTION CODE/TRAUMA/SEDATION MEDICATION
Status: COMPLETED | OUTPATIENT
Start: 2021-10-28 | End: 2021-10-28

## 2021-10-28 RX ORDER — LIDOCAINE HYDROCHLORIDE 20 MG/ML
INJECTION, SOLUTION EPIDURAL; INFILTRATION; INTRACAUDAL; PERINEURAL CODE/TRAUMA/SEDATION MEDICATION
Status: COMPLETED | OUTPATIENT
Start: 2021-10-28 | End: 2021-10-28

## 2021-10-28 RX ADMIN — ASPIRIN 81 MG CHEWABLE TABLET 81 MG: 81 TABLET CHEWABLE at 09:34

## 2021-10-28 RX ADMIN — ETOMIDATE 20 MG: 20 INJECTION, SOLUTION INTRAVENOUS at 04:09

## 2021-10-28 RX ADMIN — Medication 20 MEQ: at 21:19

## 2021-10-28 RX ADMIN — CHLORHEXIDINE GLUCONATE 15 ML: 1.2 SOLUTION ORAL at 09:35

## 2021-10-28 RX ADMIN — PROPOFOL 10 MCG/KG/MIN: 10 INJECTION, EMULSION INTRAVENOUS at 04:37

## 2021-10-28 RX ADMIN — FENTANYL CITRATE 50 MCG: 50 INJECTION INTRAMUSCULAR; INTRAVENOUS at 10:12

## 2021-10-28 RX ADMIN — ATORVASTATIN CALCIUM 40 MG: 40 TABLET, FILM COATED ORAL at 16:25

## 2021-10-28 RX ADMIN — FENTANYL CITRATE 50 MCG: 0.05 INJECTION, SOLUTION INTRAMUSCULAR; INTRAVENOUS at 04:30

## 2021-10-28 RX ADMIN — FENTANYL CITRATE 50 MCG: 50 INJECTION INTRAMUSCULAR; INTRAVENOUS at 13:59

## 2021-10-28 RX ADMIN — Medication 100 MG: at 04:09

## 2021-10-28 RX ADMIN — MILRINONE LACTATE IN DEXTROSE 0.13 MCG/KG/MIN: 200 INJECTION, SOLUTION INTRAVENOUS at 11:33

## 2021-10-28 RX ADMIN — PROPOFOL 25 MCG/KG/MIN: 10 INJECTION, EMULSION INTRAVENOUS at 23:59

## 2021-10-28 RX ADMIN — ONDANSETRON 4 MG: 2 INJECTION INTRAMUSCULAR; INTRAVENOUS at 18:33

## 2021-10-28 RX ADMIN — LEVALBUTEROL HYDROCHLORIDE 1.25 MG: 1.25 SOLUTION, CONCENTRATE RESPIRATORY (INHALATION) at 20:10

## 2021-10-28 RX ADMIN — HEPARIN SODIUM 13.1 UNITS/KG/HR: 10000 INJECTION, SOLUTION INTRAVENOUS at 18:20

## 2021-10-28 RX ADMIN — FENTANYL CITRATE 50 MCG: 50 INJECTION INTRAMUSCULAR; INTRAVENOUS at 04:30

## 2021-10-28 RX ADMIN — FENTANYL CITRATE 100 MCG/HR: 50 INJECTION INTRAMUSCULAR; INTRAVENOUS at 23:02

## 2021-10-28 RX ADMIN — SODIUM CHLORIDE 2 UNITS/HR: 9 INJECTION, SOLUTION INTRAVENOUS at 03:26

## 2021-10-28 RX ADMIN — Medication 75 MCG/HR: at 04:16

## 2021-10-28 RX ADMIN — Medication 100 MG: at 04:30

## 2021-10-28 RX ADMIN — DICYCLOMINE HYDROCHLORIDE 20 MG: 20 TABLET ORAL at 12:06

## 2021-10-28 RX ADMIN — CHLORHEXIDINE GLUCONATE 15 ML: 1.2 SOLUTION ORAL at 21:19

## 2021-10-28 RX ADMIN — Medication 10 MG/HR: at 10:44

## 2021-10-28 RX ADMIN — SODIUM CHLORIDE 6 UNITS/HR: 9 INJECTION, SOLUTION INTRAVENOUS at 05:12

## 2021-10-28 RX ADMIN — ETOMIDATE 20 MG: 20 INJECTION, SOLUTION INTRAVENOUS at 04:30

## 2021-10-28 RX ADMIN — AMIODARONE HYDROCHLORIDE 1 MG/MIN: 50 INJECTION, SOLUTION INTRAVENOUS at 18:20

## 2021-10-28 RX ADMIN — PROPOFOL 20 MCG/KG/MIN: 10 INJECTION, EMULSION INTRAVENOUS at 08:51

## 2021-10-28 RX ADMIN — FENTANYL CITRATE 75 MCG/HR: 50 INJECTION, SOLUTION INTRAMUSCULAR; INTRAVENOUS at 04:38

## 2021-10-28 RX ADMIN — LIDOCAINE HYDROCHLORIDE 100 MG: 20 INJECTION, SOLUTION EPIDURAL; INFILTRATION; INTRACAUDAL; PERINEURAL at 03:59

## 2021-10-28 RX ADMIN — IPRATROPIUM BROMIDE 0.5 MG: 0.5 SOLUTION RESPIRATORY (INHALATION) at 20:10

## 2021-10-28 RX ADMIN — PROPOFOL 30 MCG/KG/MIN: 10 INJECTION, EMULSION INTRAVENOUS at 16:38

## 2021-10-28 RX ADMIN — ACETAMINOPHEN 650 MG: 650 SUSPENSION ORAL at 21:18

## 2021-10-28 RX ADMIN — FENTANYL CITRATE 100 MCG/HR: 50 INJECTION INTRAMUSCULAR; INTRAVENOUS at 13:23

## 2021-10-28 RX ADMIN — POTASSIUM CHLORIDE 40 MEQ: 29.8 INJECTION, SOLUTION INTRAVENOUS at 09:51

## 2021-10-28 RX ADMIN — DEXTROSE MONOHYDRATE: 500 INJECTION PARENTERAL at 00:20

## 2021-10-28 RX ADMIN — MILRINONE LACTATE IN DEXTROSE 0.25 MCG/KG/MIN: 200 INJECTION, SOLUTION INTRAVENOUS at 00:29

## 2021-10-28 RX ADMIN — TICAGRELOR 90 MG: 90 TABLET ORAL at 21:19

## 2021-10-28 RX ADMIN — HYDRALAZINE HYDROCHLORIDE 25 MG: 25 TABLET ORAL at 14:09

## 2021-10-28 RX ADMIN — ISOSORBIDE DINITRATE 10 MG: 10 TABLET ORAL at 16:35

## 2021-10-28 RX ADMIN — FENTANYL CITRATE 75 MCG/HR: 50 INJECTION INTRAMUSCULAR; INTRAVENOUS at 04:38

## 2021-10-28 RX ADMIN — Medication 20 MEQ: at 09:35

## 2021-10-28 RX ADMIN — HEPARIN SODIUM 2000 UNITS: 1000 INJECTION INTRAVENOUS; SUBCUTANEOUS at 09:32

## 2021-10-28 RX ADMIN — PROPOFOL 10 MCG/KG/MIN: 10 INJECTION, EMULSION INTRAVENOUS at 04:22

## 2021-10-28 RX ADMIN — MAGNESIUM SULFATE HEPTAHYDRATE 2 G: 500 INJECTION, SOLUTION INTRAMUSCULAR; INTRAVENOUS at 04:09

## 2021-10-28 RX ADMIN — DICYCLOMINE HYDROCHLORIDE 20 MG: 20 TABLET ORAL at 16:35

## 2021-10-28 RX ADMIN — TICAGRELOR 90 MG: 90 TABLET ORAL at 09:35

## 2021-10-28 RX ADMIN — MAGNESIUM SULFATE HEPTAHYDRATE 2 G: 40 INJECTION, SOLUTION INTRAVENOUS at 04:30

## 2021-10-28 RX ADMIN — HYDRALAZINE HYDROCHLORIDE 25 MG: 25 TABLET, FILM COATED ORAL at 21:19

## 2021-10-28 RX ADMIN — FENTANYL CITRATE 50 MCG: 50 INJECTION INTRAMUSCULAR; INTRAVENOUS at 04:21

## 2021-10-28 RX ADMIN — Medication 20 MEQ: at 16:25

## 2021-10-28 RX ADMIN — DICYCLOMINE HYDROCHLORIDE 20 MG: 20 TABLET ORAL at 23:54

## 2021-10-29 ENCOUNTER — APPOINTMENT (INPATIENT)
Dept: RADIOLOGY | Facility: HOSPITAL | Age: 56
DRG: 003 | End: 2021-10-29
Payer: MEDICARE

## 2021-10-29 LAB
ALBUMIN SERPL BCP-MCNC: 1.8 G/DL (ref 3.5–5)
ALBUMIN SERPL BCP-MCNC: 1.9 G/DL (ref 3.5–5)
ALP SERPL-CCNC: 92 U/L (ref 46–116)
ALP SERPL-CCNC: 98 U/L (ref 46–116)
ALT SERPL W P-5'-P-CCNC: 69 U/L (ref 12–78)
ALT SERPL W P-5'-P-CCNC: 88 U/L (ref 12–78)
ANION GAP SERPL CALCULATED.3IONS-SCNC: 10 MMOL/L (ref 4–13)
ANION GAP SERPL CALCULATED.3IONS-SCNC: 5 MMOL/L (ref 4–13)
ANION GAP SERPL CALCULATED.3IONS-SCNC: 7 MMOL/L (ref 4–13)
APTT PPP: 82 SECONDS (ref 23–37)
APTT PPP: 86 SECONDS (ref 23–37)
ARTERIAL PATENCY WRIST A: NO
AST SERPL W P-5'-P-CCNC: 112 U/L (ref 5–45)
AST SERPL W P-5'-P-CCNC: 96 U/L (ref 5–45)
BACTERIA UR QL AUTO: ABNORMAL /HPF
BASE EX.OXY STD BLDV CALC-SCNC: 43.3 % (ref 60–80)
BASE EX.OXY STD BLDV CALC-SCNC: 52 % (ref 60–80)
BASE EX.OXY STD BLDV CALC-SCNC: 58.5 % (ref 60–80)
BASE EX.OXY STD BLDV CALC-SCNC: 72.8 % (ref 60–80)
BASE EXCESS BLDA CALC-SCNC: 0.2 MMOL/L
BASE EXCESS BLDV CALC-SCNC: -0.3 MMOL/L
BASE EXCESS BLDV CALC-SCNC: 0.5 MMOL/L
BASE EXCESS BLDV CALC-SCNC: 1.5 MMOL/L
BASE EXCESS BLDV CALC-SCNC: 1.5 MMOL/L
BILIRUB DIRECT SERPL-MCNC: 0.26 MG/DL (ref 0–0.2)
BILIRUB SERPL-MCNC: 0.44 MG/DL (ref 0.2–1)
BILIRUB SERPL-MCNC: 1.01 MG/DL (ref 0.2–1)
BILIRUB UR QL STRIP: NEGATIVE
BODY TEMPERATURE: 100.9 DEGREES FEHRENHEIT
BUN SERPL-MCNC: 54 MG/DL (ref 5–25)
BUN SERPL-MCNC: 56 MG/DL (ref 5–25)
BUN SERPL-MCNC: 59 MG/DL (ref 5–25)
BUN SERPL-MCNC: 62 MG/DL (ref 5–25)
BUN SERPL-MCNC: 63 MG/DL (ref 5–25)
CALCIUM ALBUM COR SERPL-MCNC: 9.7 MG/DL (ref 8.3–10.1)
CALCIUM SERPL-MCNC: 7.8 MG/DL (ref 8.3–10.1)
CALCIUM SERPL-MCNC: 7.9 MG/DL (ref 8.3–10.1)
CALCIUM SERPL-MCNC: 7.9 MG/DL (ref 8.3–10.1)
CALCIUM SERPL-MCNC: 8 MG/DL (ref 8.3–10.1)
CALCIUM SERPL-MCNC: 8 MG/DL (ref 8.3–10.1)
CHLORIDE SERPL-SCNC: 95 MMOL/L (ref 100–108)
CHLORIDE SERPL-SCNC: 96 MMOL/L (ref 100–108)
CLARITY UR: ABNORMAL
CO2 SERPL-SCNC: 24 MMOL/L (ref 21–32)
CO2 SERPL-SCNC: 24 MMOL/L (ref 21–32)
CO2 SERPL-SCNC: 25 MMOL/L (ref 21–32)
CO2 SERPL-SCNC: 27 MMOL/L (ref 21–32)
CO2 SERPL-SCNC: 27 MMOL/L (ref 21–32)
COLOR UR: YELLOW
CREAT SERPL-MCNC: 2.37 MG/DL (ref 0.6–1.3)
CREAT SERPL-MCNC: 2.39 MG/DL (ref 0.6–1.3)
CREAT SERPL-MCNC: 2.43 MG/DL (ref 0.6–1.3)
CREAT SERPL-MCNC: 2.47 MG/DL (ref 0.6–1.3)
CREAT SERPL-MCNC: 2.56 MG/DL (ref 0.6–1.3)
ERYTHROCYTE [DISTWIDTH] IN BLOOD BY AUTOMATED COUNT: 15.9 % (ref 11.6–15.1)
ERYTHROCYTE [DISTWIDTH] IN BLOOD BY AUTOMATED COUNT: 15.9 % (ref 11.6–15.1)
GFR SERPL CREATININE-BSD FRML MDRD: 20 ML/MIN/1.73SQ M
GFR SERPL CREATININE-BSD FRML MDRD: 21 ML/MIN/1.73SQ M
GFR SERPL CREATININE-BSD FRML MDRD: 22 ML/MIN/1.73SQ M
GLUCOSE SERPL-MCNC: 106 MG/DL (ref 65–140)
GLUCOSE SERPL-MCNC: 115 MG/DL (ref 65–140)
GLUCOSE SERPL-MCNC: 122 MG/DL (ref 65–140)
GLUCOSE SERPL-MCNC: 131 MG/DL (ref 65–140)
GLUCOSE SERPL-MCNC: 134 MG/DL (ref 65–140)
GLUCOSE SERPL-MCNC: 136 MG/DL (ref 65–140)
GLUCOSE SERPL-MCNC: 141 MG/DL (ref 65–140)
GLUCOSE SERPL-MCNC: 144 MG/DL (ref 65–140)
GLUCOSE SERPL-MCNC: 144 MG/DL (ref 65–140)
GLUCOSE SERPL-MCNC: 145 MG/DL (ref 65–140)
GLUCOSE SERPL-MCNC: 147 MG/DL (ref 65–140)
GLUCOSE SERPL-MCNC: 149 MG/DL (ref 65–140)
GLUCOSE SERPL-MCNC: 152 MG/DL (ref 65–140)
GLUCOSE SERPL-MCNC: 155 MG/DL (ref 65–140)
GLUCOSE SERPL-MCNC: 156 MG/DL (ref 65–140)
GLUCOSE SERPL-MCNC: 156 MG/DL (ref 65–140)
GLUCOSE SERPL-MCNC: 167 MG/DL (ref 65–140)
GLUCOSE UR STRIP-MCNC: NEGATIVE MG/DL
HCO3 BLDA-SCNC: 24.8 MMOL/L (ref 22–28)
HCO3 BLDV-SCNC: 24.7 MMOL/L (ref 24–30)
HCO3 BLDV-SCNC: 26.3 MMOL/L (ref 24–30)
HCO3 BLDV-SCNC: 26.8 MMOL/L (ref 24–30)
HCO3 BLDV-SCNC: 26.9 MMOL/L (ref 24–30)
HCT VFR BLD AUTO: 27.2 % (ref 34.8–46.1)
HCT VFR BLD AUTO: 28.8 % (ref 34.8–46.1)
HGB BLD-MCNC: 8.5 G/DL (ref 11.5–15.4)
HGB BLD-MCNC: 9 G/DL (ref 11.5–15.4)
HGB UR QL STRIP.AUTO: NEGATIVE
HOROWITZ INDEX BLDA+IHG-RTO: 60 MM[HG]
KETONES UR STRIP-MCNC: NEGATIVE MG/DL
LACTATE SERPL-SCNC: 1.1 MMOL/L (ref 0.5–2)
LEUKOCYTE ESTERASE UR QL STRIP: ABNORMAL
LIDOCAIN SERPL-MCNC: 4.3 UG/ML
MAGNESIUM SERPL-MCNC: 3.1 MG/DL (ref 1.6–2.6)
MAGNESIUM SERPL-MCNC: 3.3 MG/DL (ref 1.6–2.6)
MAGNESIUM SERPL-MCNC: 3.4 MG/DL (ref 1.6–2.6)
MCH RBC QN AUTO: 26.8 PG (ref 26.8–34.3)
MCH RBC QN AUTO: 27 PG (ref 26.8–34.3)
MCHC RBC AUTO-ENTMCNC: 31.3 G/DL (ref 31.4–37.4)
MCHC RBC AUTO-ENTMCNC: 31.3 G/DL (ref 31.4–37.4)
MCV RBC AUTO: 86 FL (ref 82–98)
MCV RBC AUTO: 87 FL (ref 82–98)
NITRITE UR QL STRIP: NEGATIVE
NON-SQ EPI CELLS URNS QL MICRO: ABNORMAL /HPF
O2 CT BLDA-SCNC: 13.2 ML/DL (ref 16–23)
O2 CT BLDV-SCNC: 10.7 ML/DL
O2 CT BLDV-SCNC: 5.7 ML/DL
O2 CT BLDV-SCNC: 7.2 ML/DL
O2 CT BLDV-SCNC: 8.2 ML/DL
OXYHGB MFR BLDA: 97.4 % (ref 94–97)
PCO2 BLD: 44.1 MM HG (ref 42–50)
PCO2 BLDA: 39.7 MM HG (ref 36–44)
PCO2 BLDV: 41.7 MM HG (ref 42–50)
PCO2 BLDV: 42.2 MM HG (ref 42–50)
PCO2 BLDV: 46.7 MM HG (ref 42–50)
PCO2 BLDV: 52.2 MM HG (ref 42–50)
PEEP RESPIRATORY: 6 CM[H2O]
PH BLD: 7.37 [PH] (ref 7.3–7.4)
PH BLDA: 7.41 [PH] (ref 7.35–7.45)
PH BLDV: 7.33 [PH] (ref 7.3–7.4)
PH BLDV: 7.38 [PH] (ref 7.3–7.4)
PH BLDV: 7.39 [PH] (ref 7.3–7.4)
PH BLDV: 7.41 [PH] (ref 7.3–7.4)
PH UR STRIP.AUTO: 5 [PH]
PHOSPHATE SERPL-MCNC: 6.2 MG/DL (ref 2.7–4.5)
PLATELET # BLD AUTO: 305 THOUSANDS/UL (ref 149–390)
PLATELET # BLD AUTO: 306 THOUSANDS/UL (ref 149–390)
PMV BLD AUTO: 10.2 FL (ref 8.9–12.7)
PMV BLD AUTO: 9.9 FL (ref 8.9–12.7)
PO2 BLDA: 106.1 MM HG (ref 75–129)
PO2 BLDV: 27.6 MM HG (ref 35–45)
PO2 BLDV: 29.4 MM HG (ref 35–45)
PO2 BLDV: 34 MM HG (ref 35–45)
PO2 BLDV: 41.5 MM HG (ref 35–45)
PO2 VENOUS TEMP CORRECTED: 32.2 MM HG (ref 35–45)
POTASSIUM SERPL-SCNC: 4.5 MMOL/L (ref 3.5–5.3)
POTASSIUM SERPL-SCNC: 4.6 MMOL/L (ref 3.5–5.3)
POTASSIUM SERPL-SCNC: 4.6 MMOL/L (ref 3.5–5.3)
POTASSIUM SERPL-SCNC: 4.7 MMOL/L (ref 3.5–5.3)
POTASSIUM SERPL-SCNC: 4.9 MMOL/L (ref 3.5–5.3)
PRESSURE CONTROL: 12
PRESSURE CONTROL: 12
PROT SERPL-MCNC: 6.5 G/DL (ref 6.4–8.2)
PROT SERPL-MCNC: 6.6 G/DL (ref 6.4–8.2)
PROT UR STRIP-MCNC: NEGATIVE MG/DL
PS VENT FIO2: 60
PS VENT PEEP: 6
RBC # BLD AUTO: 3.17 MILLION/UL (ref 3.81–5.12)
RBC # BLD AUTO: 3.33 MILLION/UL (ref 3.81–5.12)
RBC #/AREA URNS AUTO: ABNORMAL /HPF
SIMV VENT PC: 12
SIMV VENT: ABNORMAL
SODIUM SERPL-SCNC: 127 MMOL/L (ref 136–145)
SODIUM SERPL-SCNC: 128 MMOL/L (ref 136–145)
SODIUM SERPL-SCNC: 128 MMOL/L (ref 136–145)
SODIUM SERPL-SCNC: 129 MMOL/L (ref 136–145)
SODIUM SERPL-SCNC: 130 MMOL/L (ref 136–145)
SP GR UR STRIP.AUTO: 1.01 (ref 1–1.03)
SPECIMEN SOURCE: ABNORMAL
UROBILINOGEN UR QL STRIP.AUTO: 0.2 E.U./DL
VENT - PS: ABNORMAL
VENT AC: 12
VENT- AC: AC
WBC # BLD AUTO: 17.33 THOUSAND/UL (ref 4.31–10.16)
WBC # BLD AUTO: 20.41 THOUSAND/UL (ref 4.31–10.16)
WBC #/AREA URNS AUTO: ABNORMAL /HPF

## 2021-10-29 PROCEDURE — 94760 N-INVAS EAR/PLS OXIMETRY 1: CPT

## 2021-10-29 PROCEDURE — 82805 BLOOD GASES W/O2 SATURATION: CPT | Performed by: STUDENT IN AN ORGANIZED HEALTH CARE EDUCATION/TRAINING PROGRAM

## 2021-10-29 PROCEDURE — 82805 BLOOD GASES W/O2 SATURATION: CPT | Performed by: INTERNAL MEDICINE

## 2021-10-29 PROCEDURE — 80048 BASIC METABOLIC PNL TOTAL CA: CPT | Performed by: PHYSICIAN ASSISTANT

## 2021-10-29 PROCEDURE — 83605 ASSAY OF LACTIC ACID: CPT | Performed by: PHYSICIAN ASSISTANT

## 2021-10-29 PROCEDURE — 83735 ASSAY OF MAGNESIUM: CPT | Performed by: PHYSICIAN ASSISTANT

## 2021-10-29 PROCEDURE — 80076 HEPATIC FUNCTION PANEL: CPT | Performed by: PHYSICIAN ASSISTANT

## 2021-10-29 PROCEDURE — 71045 X-RAY EXAM CHEST 1 VIEW: CPT

## 2021-10-29 PROCEDURE — 82805 BLOOD GASES W/O2 SATURATION: CPT | Performed by: PHYSICIAN ASSISTANT

## 2021-10-29 PROCEDURE — 94640 AIRWAY INHALATION TREATMENT: CPT

## 2021-10-29 PROCEDURE — 87186 SC STD MICRODIL/AGAR DIL: CPT | Performed by: STUDENT IN AN ORGANIZED HEALTH CARE EDUCATION/TRAINING PROGRAM

## 2021-10-29 PROCEDURE — 80053 COMPREHEN METABOLIC PANEL: CPT | Performed by: PHYSICIAN ASSISTANT

## 2021-10-29 PROCEDURE — 94003 VENT MGMT INPAT SUBQ DAY: CPT

## 2021-10-29 PROCEDURE — 82948 REAGENT STRIP/BLOOD GLUCOSE: CPT

## 2021-10-29 PROCEDURE — 87086 URINE CULTURE/COLONY COUNT: CPT | Performed by: STUDENT IN AN ORGANIZED HEALTH CARE EDUCATION/TRAINING PROGRAM

## 2021-10-29 PROCEDURE — 85027 COMPLETE CBC AUTOMATED: CPT | Performed by: PHYSICIAN ASSISTANT

## 2021-10-29 PROCEDURE — 84100 ASSAY OF PHOSPHORUS: CPT | Performed by: PHYSICIAN ASSISTANT

## 2021-10-29 PROCEDURE — 87077 CULTURE AEROBIC IDENTIFY: CPT | Performed by: STUDENT IN AN ORGANIZED HEALTH CARE EDUCATION/TRAINING PROGRAM

## 2021-10-29 PROCEDURE — 99291 CRITICAL CARE FIRST HOUR: CPT | Performed by: PHYSICIAN ASSISTANT

## 2021-10-29 PROCEDURE — 87040 BLOOD CULTURE FOR BACTERIA: CPT | Performed by: STUDENT IN AN ORGANIZED HEALTH CARE EDUCATION/TRAINING PROGRAM

## 2021-10-29 PROCEDURE — 85730 THROMBOPLASTIN TIME PARTIAL: CPT | Performed by: PHYSICIAN ASSISTANT

## 2021-10-29 PROCEDURE — 99233 SBSQ HOSP IP/OBS HIGH 50: CPT | Performed by: INTERNAL MEDICINE

## 2021-10-29 PROCEDURE — 99292 CRITICAL CARE ADDL 30 MIN: CPT | Performed by: PHYSICIAN ASSISTANT

## 2021-10-29 PROCEDURE — 81001 URINALYSIS AUTO W/SCOPE: CPT | Performed by: STUDENT IN AN ORGANIZED HEALTH CARE EDUCATION/TRAINING PROGRAM

## 2021-10-29 PROCEDURE — 99231 SBSQ HOSP IP/OBS SF/LOW 25: CPT | Performed by: INTERNAL MEDICINE

## 2021-10-29 RX ORDER — MILRINONE LACTATE 0.2 MG/ML
0.13 INJECTION, SOLUTION INTRAVENOUS CONTINUOUS
Status: DISCONTINUED | OUTPATIENT
Start: 2021-10-29 | End: 2021-11-02

## 2021-10-29 RX ORDER — HYDRALAZINE HYDROCHLORIDE 10 MG/1
10 TABLET, FILM COATED ORAL EVERY 8 HOURS SCHEDULED
Status: DISCONTINUED | OUTPATIENT
Start: 2021-10-29 | End: 2021-10-30

## 2021-10-29 RX ORDER — HYDRALAZINE HYDROCHLORIDE 10 MG/1
10 TABLET, FILM COATED ORAL EVERY 8 HOURS SCHEDULED
Status: DISCONTINUED | OUTPATIENT
Start: 2021-10-29 | End: 2021-10-29

## 2021-10-29 RX ORDER — EPINEPHRINE 1 MG/ML
INJECTION, SOLUTION, CONCENTRATE INTRAVENOUS
Status: COMPLETED
Start: 2021-10-29 | End: 2021-10-29

## 2021-10-29 RX ADMIN — CHLORHEXIDINE GLUCONATE 15 ML: 1.2 SOLUTION ORAL at 21:28

## 2021-10-29 RX ADMIN — EPINEPHRINE 1 MG: 1 INJECTION, SOLUTION, CONCENTRATE INTRAVENOUS at 21:10

## 2021-10-29 RX ADMIN — EPINEPHRINE 1 MCG/MIN: 1 INJECTION, SOLUTION, CONCENTRATE INTRAVENOUS at 20:48

## 2021-10-29 RX ADMIN — TICAGRELOR 90 MG: 90 TABLET ORAL at 21:28

## 2021-10-29 RX ADMIN — HYDRALAZINE HYDROCHLORIDE 25 MG: 25 TABLET, FILM COATED ORAL at 05:02

## 2021-10-29 RX ADMIN — ATORVASTATIN CALCIUM 40 MG: 40 TABLET, FILM COATED ORAL at 17:17

## 2021-10-29 RX ADMIN — MILRINONE LACTATE IN DEXTROSE 0.13 MCG/KG/MIN: 200 INJECTION, SOLUTION INTRAVENOUS at 08:34

## 2021-10-29 RX ADMIN — FENTANYL CITRATE 100 MCG/HR: 50 INJECTION INTRAMUSCULAR; INTRAVENOUS at 08:05

## 2021-10-29 RX ADMIN — LEVALBUTEROL HYDROCHLORIDE 1.25 MG: 1.25 SOLUTION, CONCENTRATE RESPIRATORY (INHALATION) at 13:31

## 2021-10-29 RX ADMIN — DICYCLOMINE HYDROCHLORIDE 20 MG: 20 TABLET ORAL at 12:01

## 2021-10-29 RX ADMIN — MILRINONE LACTATE IN DEXTROSE 0.25 MCG/KG/MIN: 200 INJECTION, SOLUTION INTRAVENOUS at 21:08

## 2021-10-29 RX ADMIN — Medication 20 MG: at 05:12

## 2021-10-29 RX ADMIN — IPRATROPIUM BROMIDE 0.5 MG: 0.5 SOLUTION RESPIRATORY (INHALATION) at 20:24

## 2021-10-29 RX ADMIN — AMIODARONE HYDROCHLORIDE 1 MG/MIN: 50 INJECTION, SOLUTION INTRAVENOUS at 08:34

## 2021-10-29 RX ADMIN — SODIUM CHLORIDE 4 UNITS/HR: 9 INJECTION, SOLUTION INTRAVENOUS at 12:46

## 2021-10-29 RX ADMIN — ASPIRIN 81 MG CHEWABLE TABLET 81 MG: 81 TABLET CHEWABLE at 08:15

## 2021-10-29 RX ADMIN — IPRATROPIUM BROMIDE 0.5 MG: 0.5 SOLUTION RESPIRATORY (INHALATION) at 13:31

## 2021-10-29 RX ADMIN — Medication 10 MG/HR: at 08:27

## 2021-10-29 RX ADMIN — CHLORHEXIDINE GLUCONATE 15 ML: 1.2 SOLUTION ORAL at 08:15

## 2021-10-29 RX ADMIN — LEVALBUTEROL HYDROCHLORIDE 1.25 MG: 1.25 SOLUTION, CONCENTRATE RESPIRATORY (INHALATION) at 20:24

## 2021-10-29 RX ADMIN — NOREPINEPHRINE BITARTRATE 2 MCG/MIN: 1 INJECTION, SOLUTION, CONCENTRATE INTRAVENOUS at 20:49

## 2021-10-29 RX ADMIN — ACETAMINOPHEN 650 MG: 650 SUSPENSION ORAL at 14:57

## 2021-10-29 RX ADMIN — PROPOFOL 20 MCG/KG/MIN: 10 INJECTION, EMULSION INTRAVENOUS at 20:00

## 2021-10-29 RX ADMIN — FENTANYL CITRATE 100 MCG/HR: 50 INJECTION INTRAMUSCULAR; INTRAVENOUS at 17:55

## 2021-10-29 RX ADMIN — LIDOCAINE HYDROCHLORIDE ANHYDROUS AND DEXTROSE MONOHYDRATE 1 MG/MIN: .8; 5 INJECTION, SOLUTION INTRAVENOUS at 03:35

## 2021-10-29 RX ADMIN — HEPARIN SODIUM 13.11 UNITS/KG/HR: 10000 INJECTION, SOLUTION INTRAVENOUS at 14:57

## 2021-10-29 RX ADMIN — HYDRALAZINE HYDROCHLORIDE 10 MG: 10 TABLET, FILM COATED ORAL at 14:30

## 2021-10-29 RX ADMIN — DICYCLOMINE HYDROCHLORIDE 20 MG: 20 TABLET ORAL at 05:02

## 2021-10-29 RX ADMIN — PROPOFOL 25 MCG/KG/MIN: 10 INJECTION, EMULSION INTRAVENOUS at 07:32

## 2021-10-29 RX ADMIN — DICYCLOMINE HYDROCHLORIDE 20 MG: 20 TABLET ORAL at 17:17

## 2021-10-29 RX ADMIN — IPRATROPIUM BROMIDE 0.5 MG: 0.5 SOLUTION RESPIRATORY (INHALATION) at 08:17

## 2021-10-29 RX ADMIN — TICAGRELOR 90 MG: 90 TABLET ORAL at 08:15

## 2021-10-29 RX ADMIN — PROPOFOL 20 MCG/KG/MIN: 10 INJECTION, EMULSION INTRAVENOUS at 11:58

## 2021-10-29 RX ADMIN — LEVALBUTEROL HYDROCHLORIDE 1.25 MG: 1.25 SOLUTION, CONCENTRATE RESPIRATORY (INHALATION) at 08:16

## 2021-10-30 ENCOUNTER — APPOINTMENT (INPATIENT)
Dept: NON INVASIVE DIAGNOSTICS | Facility: HOSPITAL | Age: 56
DRG: 003 | End: 2021-10-30
Payer: MEDICARE

## 2021-10-30 PROBLEM — I48.91 A-FIB (HCC): Status: ACTIVE | Noted: 2021-10-30

## 2021-10-30 LAB
ALBUMIN SERPL BCP-MCNC: 1.8 G/DL (ref 3.5–5)
ALP SERPL-CCNC: 90 U/L (ref 46–116)
ALT SERPL W P-5'-P-CCNC: 61 U/L (ref 12–78)
ANION GAP SERPL CALCULATED.3IONS-SCNC: 8 MMOL/L (ref 4–13)
ANION GAP SERPL CALCULATED.3IONS-SCNC: 9 MMOL/L (ref 4–13)
ANION GAP SERPL CALCULATED.3IONS-SCNC: 9 MMOL/L (ref 4–13)
APICAL FOUR CHAMBER EJECTION FRACTION: 42 %
APTT PPP: 87 SECONDS (ref 23–37)
AST SERPL W P-5'-P-CCNC: 80 U/L (ref 5–45)
BASE EX.OXY STD BLDV CALC-SCNC: 49.7 % (ref 60–80)
BASE EXCESS BLDV CALC-SCNC: -1 MMOL/L
BILIRUB DIRECT SERPL-MCNC: 0.28 MG/DL (ref 0–0.2)
BILIRUB SERPL-MCNC: 0.53 MG/DL (ref 0.2–1)
BUN SERPL-MCNC: 62 MG/DL (ref 5–25)
BUN SERPL-MCNC: 62 MG/DL (ref 5–25)
BUN SERPL-MCNC: 65 MG/DL (ref 5–25)
CALCIUM SERPL-MCNC: 7.7 MG/DL (ref 8.3–10.1)
CALCIUM SERPL-MCNC: 7.7 MG/DL (ref 8.3–10.1)
CALCIUM SERPL-MCNC: 7.8 MG/DL (ref 8.3–10.1)
CHLORIDE SERPL-SCNC: 95 MMOL/L (ref 100–108)
CHLORIDE SERPL-SCNC: 97 MMOL/L (ref 100–108)
CHLORIDE SERPL-SCNC: 97 MMOL/L (ref 100–108)
CO2 SERPL-SCNC: 23 MMOL/L (ref 21–32)
CO2 SERPL-SCNC: 23 MMOL/L (ref 21–32)
CO2 SERPL-SCNC: 26 MMOL/L (ref 21–32)
CREAT SERPL-MCNC: 2.25 MG/DL (ref 0.6–1.3)
CREAT SERPL-MCNC: 2.29 MG/DL (ref 0.6–1.3)
CREAT SERPL-MCNC: 2.45 MG/DL (ref 0.6–1.3)
ERYTHROCYTE [DISTWIDTH] IN BLOOD BY AUTOMATED COUNT: 16 % (ref 11.6–15.1)
GFR SERPL CREATININE-BSD FRML MDRD: 22 ML/MIN/1.73SQ M
GFR SERPL CREATININE-BSD FRML MDRD: 23 ML/MIN/1.73SQ M
GFR SERPL CREATININE-BSD FRML MDRD: 24 ML/MIN/1.73SQ M
GLUCOSE SERPL-MCNC: 114 MG/DL (ref 65–140)
GLUCOSE SERPL-MCNC: 148 MG/DL (ref 65–140)
GLUCOSE SERPL-MCNC: 152 MG/DL (ref 65–140)
GLUCOSE SERPL-MCNC: 162 MG/DL (ref 65–140)
GLUCOSE SERPL-MCNC: 163 MG/DL (ref 65–140)
GLUCOSE SERPL-MCNC: 168 MG/DL (ref 65–140)
GLUCOSE SERPL-MCNC: 171 MG/DL (ref 65–140)
GLUCOSE SERPL-MCNC: 172 MG/DL (ref 65–140)
GLUCOSE SERPL-MCNC: 179 MG/DL (ref 65–140)
GLUCOSE SERPL-MCNC: 193 MG/DL (ref 65–140)
GLUCOSE SERPL-MCNC: 195 MG/DL (ref 65–140)
GLUCOSE SERPL-MCNC: 196 MG/DL (ref 65–140)
GLUCOSE SERPL-MCNC: 209 MG/DL (ref 65–140)
GLUCOSE SERPL-MCNC: 223 MG/DL (ref 65–140)
GLUCOSE SERPL-MCNC: 227 MG/DL (ref 65–140)
HCO3 BLDV-SCNC: 23.5 MMOL/L (ref 24–30)
HCT VFR BLD AUTO: 26.6 % (ref 34.8–46.1)
HGB BLD-MCNC: 8.4 G/DL (ref 11.5–15.4)
HOROWITZ INDEX BLDA+IHG-RTO: 60 MM[HG]
LAAS-AP4: 25.9 CM2
LEFT VENTRICLE DIASTOLIC VOLUME (MOD BIPLANE): 84 ML
LEFT VENTRICLE SYSTOLIC VOLUME (MOD BIPLANE): 46 ML
LV EF: 45 %
MAGNESIUM SERPL-MCNC: 2.9 MG/DL (ref 1.6–2.6)
MCH RBC QN AUTO: 27 PG (ref 26.8–34.3)
MCHC RBC AUTO-ENTMCNC: 31.6 G/DL (ref 31.4–37.4)
MCV RBC AUTO: 86 FL (ref 82–98)
O2 CT BLDV-SCNC: 6.5 ML/DL
PCO2 BLDV: 38.3 MM HG (ref 42–50)
PEEP RESPIRATORY: 6 CM[H2O]
PH BLDV: 7.41 [PH] (ref 7.3–7.4)
PHOSPHATE SERPL-MCNC: 7.6 MG/DL (ref 2.7–4.5)
PLATELET # BLD AUTO: 340 THOUSANDS/UL (ref 149–390)
PMV BLD AUTO: 10.1 FL (ref 8.9–12.7)
PO2 BLDV: 27.9 MM HG (ref 35–45)
POTASSIUM SERPL-SCNC: 3.5 MMOL/L (ref 3.5–5.3)
POTASSIUM SERPL-SCNC: 3.7 MMOL/L (ref 3.5–5.3)
POTASSIUM SERPL-SCNC: 3.9 MMOL/L (ref 3.5–5.3)
PRESSURE CONTROL: 12
PROT SERPL-MCNC: 6.6 G/DL (ref 6.4–8.2)
RBC # BLD AUTO: 3.11 MILLION/UL (ref 3.81–5.12)
RIGHT ATRIAL 2D VOLUME: 72 ML
RIGHT VENTRICLE ID DIMENSION: 2.9 CM
SL CV LV EF: 40
SODIUM SERPL-SCNC: 129 MMOL/L (ref 136–145)
TR PEAK VELOCITY: 2.7 M/S
TRICUSPID VALVE PEAK REGURGITATION VELOCITY: 2.7 M/S
TRICUSPID VALVE S': 66 CM/S
TV PEAK GRADIENT: 29 MMHG
VENT- AC: AC
VENT- APRV: ABNORMAL
WBC # BLD AUTO: 21.71 THOUSAND/UL (ref 4.31–10.16)

## 2021-10-30 PROCEDURE — 5A2204Z RESTORATION OF CARDIAC RHYTHM, SINGLE: ICD-10-PCS | Performed by: ANESTHESIOLOGY

## 2021-10-30 PROCEDURE — 85730 THROMBOPLASTIN TIME PARTIAL: CPT | Performed by: PHYSICIAN ASSISTANT

## 2021-10-30 PROCEDURE — 93325 DOPPLER ECHO COLOR FLOW MAPG: CPT | Performed by: INTERNAL MEDICINE

## 2021-10-30 PROCEDURE — 94760 N-INVAS EAR/PLS OXIMETRY 1: CPT

## 2021-10-30 PROCEDURE — 87070 CULTURE OTHR SPECIMN AEROBIC: CPT | Performed by: PHYSICIAN ASSISTANT

## 2021-10-30 PROCEDURE — 92960 CARDIOVERSION ELECTRIC EXT: CPT | Performed by: INTERNAL MEDICINE

## 2021-10-30 PROCEDURE — 99232 SBSQ HOSP IP/OBS MODERATE 35: CPT | Performed by: INTERNAL MEDICINE

## 2021-10-30 PROCEDURE — 80048 BASIC METABOLIC PNL TOTAL CA: CPT | Performed by: PHYSICIAN ASSISTANT

## 2021-10-30 PROCEDURE — 93321 DOPPLER ECHO F-UP/LMTD STD: CPT | Performed by: INTERNAL MEDICINE

## 2021-10-30 PROCEDURE — 82948 REAGENT STRIP/BLOOD GLUCOSE: CPT

## 2021-10-30 PROCEDURE — 87081 CULTURE SCREEN ONLY: CPT | Performed by: PHYSICIAN ASSISTANT

## 2021-10-30 PROCEDURE — 87205 SMEAR GRAM STAIN: CPT | Performed by: PHYSICIAN ASSISTANT

## 2021-10-30 PROCEDURE — 93308 TTE F-UP OR LMTD: CPT | Performed by: INTERNAL MEDICINE

## 2021-10-30 PROCEDURE — 99291 CRITICAL CARE FIRST HOUR: CPT | Performed by: PHYSICIAN ASSISTANT

## 2021-10-30 PROCEDURE — 87186 SC STD MICRODIL/AGAR DIL: CPT | Performed by: PHYSICIAN ASSISTANT

## 2021-10-30 PROCEDURE — 83735 ASSAY OF MAGNESIUM: CPT | Performed by: PHYSICIAN ASSISTANT

## 2021-10-30 PROCEDURE — 94003 VENT MGMT INPAT SUBQ DAY: CPT

## 2021-10-30 PROCEDURE — 94640 AIRWAY INHALATION TREATMENT: CPT

## 2021-10-30 PROCEDURE — 85027 COMPLETE CBC AUTOMATED: CPT | Performed by: PHYSICIAN ASSISTANT

## 2021-10-30 PROCEDURE — 80076 HEPATIC FUNCTION PANEL: CPT | Performed by: PHYSICIAN ASSISTANT

## 2021-10-30 PROCEDURE — 80176 ASSAY OF LIDOCAINE: CPT | Performed by: PHYSICIAN ASSISTANT

## 2021-10-30 PROCEDURE — 93308 TTE F-UP OR LMTD: CPT

## 2021-10-30 PROCEDURE — 82805 BLOOD GASES W/O2 SATURATION: CPT | Performed by: STUDENT IN AN ORGANIZED HEALTH CARE EDUCATION/TRAINING PROGRAM

## 2021-10-30 PROCEDURE — 84100 ASSAY OF PHOSPHORUS: CPT | Performed by: PHYSICIAN ASSISTANT

## 2021-10-30 RX ORDER — FENTANYL CITRATE 50 UG/ML
50 INJECTION, SOLUTION INTRAMUSCULAR; INTRAVENOUS ONCE
Status: COMPLETED | OUTPATIENT
Start: 2021-10-30 | End: 2021-10-30

## 2021-10-30 RX ORDER — MIDAZOLAM HYDROCHLORIDE 2 MG/2ML
2 INJECTION, SOLUTION INTRAMUSCULAR; INTRAVENOUS ONCE
Status: COMPLETED | OUTPATIENT
Start: 2021-10-30 | End: 2021-10-30

## 2021-10-30 RX ORDER — MAGNESIUM SULFATE HEPTAHYDRATE 40 MG/ML
2 INJECTION, SOLUTION INTRAVENOUS ONCE
Status: COMPLETED | OUTPATIENT
Start: 2021-10-30 | End: 2021-10-30

## 2021-10-30 RX ORDER — POTASSIUM CHLORIDE 20MEQ/15ML
40 LIQUID (ML) ORAL ONCE
Status: COMPLETED | OUTPATIENT
Start: 2021-10-30 | End: 2021-10-30

## 2021-10-30 RX ADMIN — FENTANYL CITRATE 100 MCG/HR: 50 INJECTION INTRAMUSCULAR; INTRAVENOUS at 03:38

## 2021-10-30 RX ADMIN — FENTANYL CITRATE 50 MCG: 0.05 INJECTION, SOLUTION INTRAMUSCULAR; INTRAVENOUS at 16:27

## 2021-10-30 RX ADMIN — AMIODARONE HYDROCHLORIDE 1 MG/MIN: 50 INJECTION, SOLUTION INTRAVENOUS at 00:44

## 2021-10-30 RX ADMIN — ASPIRIN 81 MG CHEWABLE TABLET 81 MG: 81 TABLET CHEWABLE at 08:15

## 2021-10-30 RX ADMIN — Medication 20 MG: at 05:28

## 2021-10-30 RX ADMIN — ACETAMINOPHEN 650 MG: 325 TABLET, FILM COATED ORAL at 07:27

## 2021-10-30 RX ADMIN — TICAGRELOR 90 MG: 90 TABLET ORAL at 21:04

## 2021-10-30 RX ADMIN — PROPOFOL 15 MCG/KG/MIN: 10 INJECTION, EMULSION INTRAVENOUS at 07:22

## 2021-10-30 RX ADMIN — ACETAMINOPHEN 650 MG: 650 SUSPENSION ORAL at 00:05

## 2021-10-30 RX ADMIN — FENTANYL CITRATE 100 MCG/HR: 50 INJECTION INTRAMUSCULAR; INTRAVENOUS at 13:17

## 2021-10-30 RX ADMIN — IPRATROPIUM BROMIDE 0.5 MG: 0.5 SOLUTION RESPIRATORY (INHALATION) at 20:22

## 2021-10-30 RX ADMIN — DICYCLOMINE HYDROCHLORIDE 20 MG: 20 TABLET ORAL at 18:13

## 2021-10-30 RX ADMIN — DICYCLOMINE HYDROCHLORIDE 20 MG: 20 TABLET ORAL at 12:02

## 2021-10-30 RX ADMIN — SODIUM CHLORIDE 11 UNITS/HR: 9 INJECTION, SOLUTION INTRAVENOUS at 10:31

## 2021-10-30 RX ADMIN — DICYCLOMINE HYDROCHLORIDE 20 MG: 20 TABLET ORAL at 00:05

## 2021-10-30 RX ADMIN — MIDAZOLAM HYDROCHLORIDE 2 MG: 1 INJECTION, SOLUTION INTRAMUSCULAR; INTRAVENOUS at 16:27

## 2021-10-30 RX ADMIN — TICAGRELOR 90 MG: 90 TABLET ORAL at 08:16

## 2021-10-30 RX ADMIN — VASOPRESSIN 0.04 UNITS/MIN: 20 INJECTION INTRAVENOUS at 18:23

## 2021-10-30 RX ADMIN — MILRINONE LACTATE IN DEXTROSE 0.25 MCG/KG/MIN: 200 INJECTION, SOLUTION INTRAVENOUS at 07:26

## 2021-10-30 RX ADMIN — CHLORHEXIDINE GLUCONATE 15 ML: 1.2 SOLUTION ORAL at 08:16

## 2021-10-30 RX ADMIN — ACETAMINOPHEN 650 MG: 650 SUSPENSION ORAL at 14:07

## 2021-10-30 RX ADMIN — IPRATROPIUM BROMIDE 0.5 MG: 0.5 SOLUTION RESPIRATORY (INHALATION) at 07:44

## 2021-10-30 RX ADMIN — FENTANYL CITRATE 100 MCG/HR: 50 INJECTION INTRAMUSCULAR; INTRAVENOUS at 14:27

## 2021-10-30 RX ADMIN — LIDOCAINE HYDROCHLORIDE ANHYDROUS AND DEXTROSE MONOHYDRATE 0.5 MG/MIN: .8; 5 INJECTION, SOLUTION INTRAVENOUS at 10:28

## 2021-10-30 RX ADMIN — Medication 20 MG/HR: at 10:30

## 2021-10-30 RX ADMIN — SODIUM CHLORIDE 9 UNITS/HR: 9 INJECTION, SOLUTION INTRAVENOUS at 23:39

## 2021-10-30 RX ADMIN — CEFEPIME HYDROCHLORIDE 1000 MG: 1 INJECTION, POWDER, FOR SOLUTION INTRAMUSCULAR; INTRAVENOUS at 08:35

## 2021-10-30 RX ADMIN — MAGNESIUM SULFATE HEPTAHYDRATE 2 G: 40 INJECTION, SOLUTION INTRAVENOUS at 14:00

## 2021-10-30 RX ADMIN — ATORVASTATIN CALCIUM 40 MG: 40 TABLET, FILM COATED ORAL at 18:12

## 2021-10-30 RX ADMIN — CEFEPIME HYDROCHLORIDE 1000 MG: 1 INJECTION, POWDER, FOR SOLUTION INTRAMUSCULAR; INTRAVENOUS at 21:04

## 2021-10-30 RX ADMIN — PROPOFOL 20 MCG/KG/MIN: 10 INJECTION, EMULSION INTRAVENOUS at 00:49

## 2021-10-30 RX ADMIN — DICYCLOMINE HYDROCHLORIDE 20 MG: 20 TABLET ORAL at 05:28

## 2021-10-30 RX ADMIN — IPRATROPIUM BROMIDE 0.5 MG: 0.5 SOLUTION RESPIRATORY (INHALATION) at 13:05

## 2021-10-30 RX ADMIN — LEVALBUTEROL HYDROCHLORIDE 1.25 MG: 1.25 SOLUTION, CONCENTRATE RESPIRATORY (INHALATION) at 20:22

## 2021-10-30 RX ADMIN — LEVALBUTEROL HYDROCHLORIDE 1.25 MG: 1.25 SOLUTION, CONCENTRATE RESPIRATORY (INHALATION) at 13:05

## 2021-10-30 RX ADMIN — PROPOFOL 20 MCG/KG/MIN: 10 INJECTION, EMULSION INTRAVENOUS at 12:55

## 2021-10-30 RX ADMIN — PROPOFOL 20 MCG/KG/MIN: 10 INJECTION, EMULSION INTRAVENOUS at 18:23

## 2021-10-30 RX ADMIN — VANCOMYCIN HYDROCHLORIDE 2000 MG: 10 INJECTION, POWDER, LYOPHILIZED, FOR SOLUTION INTRAVENOUS at 08:35

## 2021-10-30 RX ADMIN — POTASSIUM CHLORIDE 40 MEQ: 20 SOLUTION ORAL at 13:09

## 2021-10-30 RX ADMIN — HEPARIN SODIUM 13.1 UNITS/KG/HR: 10000 INJECTION, SOLUTION INTRAVENOUS at 10:26

## 2021-10-30 RX ADMIN — LEVALBUTEROL HYDROCHLORIDE 1.25 MG: 1.25 SOLUTION, CONCENTRATE RESPIRATORY (INHALATION) at 07:44

## 2021-10-30 RX ADMIN — VASOPRESSIN 0.04 UNITS/MIN: 20 INJECTION INTRAVENOUS at 13:12

## 2021-10-30 RX ADMIN — CHLORHEXIDINE GLUCONATE 15 ML: 1.2 SOLUTION ORAL at 21:04

## 2021-10-30 RX ADMIN — NOREPINEPHRINE BITARTRATE 4 MCG/MIN: 1 INJECTION, SOLUTION, CONCENTRATE INTRAVENOUS at 13:15

## 2021-10-31 ENCOUNTER — APPOINTMENT (INPATIENT)
Dept: RADIOLOGY | Facility: HOSPITAL | Age: 56
DRG: 003 | End: 2021-10-31
Payer: MEDICARE

## 2021-10-31 PROBLEM — A41.9 SEPSIS (HCC): Status: ACTIVE | Noted: 2021-10-31

## 2021-10-31 LAB
ALBUMIN SERPL BCP-MCNC: 1.4 G/DL (ref 3.5–5)
ALP SERPL-CCNC: 84 U/L (ref 46–116)
ALT SERPL W P-5'-P-CCNC: 39 U/L (ref 12–78)
ANION GAP SERPL CALCULATED.3IONS-SCNC: 10 MMOL/L (ref 4–13)
ANION GAP SERPL CALCULATED.3IONS-SCNC: 12 MMOL/L (ref 4–13)
ANION GAP SERPL CALCULATED.3IONS-SCNC: 7 MMOL/L (ref 4–13)
ANION GAP SERPL CALCULATED.3IONS-SCNC: 7 MMOL/L (ref 4–13)
ANISOCYTOSIS BLD QL SMEAR: PRESENT
APTT PPP: 79 SECONDS (ref 23–37)
AST SERPL W P-5'-P-CCNC: 54 U/L (ref 5–45)
BASE EX.OXY STD BLDV CALC-SCNC: 42.6 % (ref 60–80)
BASE EXCESS BLDV CALC-SCNC: -2.6 MMOL/L
BASOPHILS # BLD MANUAL: 0 THOUSAND/UL (ref 0–0.1)
BASOPHILS NFR MAR MANUAL: 0 % (ref 0–1)
BILIRUB DIRECT SERPL-MCNC: 0.28 MG/DL (ref 0–0.2)
BILIRUB SERPL-MCNC: 0.7 MG/DL (ref 0.2–1)
BODY TEMPERATURE: 101.5 DEGREES FEHRENHEIT
BUN SERPL-MCNC: 61 MG/DL (ref 5–25)
BUN SERPL-MCNC: 62 MG/DL (ref 5–25)
BUN SERPL-MCNC: 63 MG/DL (ref 5–25)
BUN SERPL-MCNC: 64 MG/DL (ref 5–25)
BURR CELLS BLD QL SMEAR: PRESENT
CA-I BLD-SCNC: 0.9 MMOL/L (ref 1.12–1.32)
CALCIUM SERPL-MCNC: 7.8 MG/DL (ref 8.3–10.1)
CALCIUM SERPL-MCNC: 7.8 MG/DL (ref 8.3–10.1)
CALCIUM SERPL-MCNC: 7.9 MG/DL (ref 8.3–10.1)
CALCIUM SERPL-MCNC: 8.5 MG/DL (ref 8.3–10.1)
CHLORIDE SERPL-SCNC: 100 MMOL/L (ref 100–108)
CHLORIDE SERPL-SCNC: 97 MMOL/L (ref 100–108)
CHLORIDE SERPL-SCNC: 97 MMOL/L (ref 100–108)
CHLORIDE SERPL-SCNC: 99 MMOL/L (ref 100–108)
CO2 SERPL-SCNC: 22 MMOL/L (ref 21–32)
CO2 SERPL-SCNC: 22 MMOL/L (ref 21–32)
CO2 SERPL-SCNC: 23 MMOL/L (ref 21–32)
CO2 SERPL-SCNC: 26 MMOL/L (ref 21–32)
CREAT SERPL-MCNC: 2.01 MG/DL (ref 0.6–1.3)
CREAT SERPL-MCNC: 2.06 MG/DL (ref 0.6–1.3)
CREAT SERPL-MCNC: 2.09 MG/DL (ref 0.6–1.3)
CREAT SERPL-MCNC: 2.18 MG/DL (ref 0.6–1.3)
EOSINOPHIL # BLD MANUAL: 0 THOUSAND/UL (ref 0–0.4)
EOSINOPHIL NFR BLD MANUAL: 0 % (ref 0–6)
ERYTHROCYTE [DISTWIDTH] IN BLOOD BY AUTOMATED COUNT: 16.1 % (ref 11.6–15.1)
GFR SERPL CREATININE-BSD FRML MDRD: 25 ML/MIN/1.73SQ M
GFR SERPL CREATININE-BSD FRML MDRD: 26 ML/MIN/1.73SQ M
GFR SERPL CREATININE-BSD FRML MDRD: 27 ML/MIN/1.73SQ M
GFR SERPL CREATININE-BSD FRML MDRD: 27 ML/MIN/1.73SQ M
GLUCOSE SERPL-MCNC: 105 MG/DL (ref 65–140)
GLUCOSE SERPL-MCNC: 119 MG/DL (ref 65–140)
GLUCOSE SERPL-MCNC: 127 MG/DL (ref 65–140)
GLUCOSE SERPL-MCNC: 137 MG/DL (ref 65–140)
GLUCOSE SERPL-MCNC: 138 MG/DL (ref 65–140)
GLUCOSE SERPL-MCNC: 141 MG/DL (ref 65–140)
GLUCOSE SERPL-MCNC: 153 MG/DL (ref 65–140)
GLUCOSE SERPL-MCNC: 155 MG/DL (ref 65–140)
GLUCOSE SERPL-MCNC: 158 MG/DL (ref 65–140)
GLUCOSE SERPL-MCNC: 165 MG/DL (ref 65–140)
GLUCOSE SERPL-MCNC: 178 MG/DL (ref 65–140)
GLUCOSE SERPL-MCNC: 183 MG/DL (ref 65–140)
GLUCOSE SERPL-MCNC: 219 MG/DL (ref 65–140)
GLUCOSE SERPL-MCNC: 223 MG/DL (ref 65–140)
GLUCOSE SERPL-MCNC: 226 MG/DL (ref 65–140)
GLUCOSE SERPL-MCNC: 229 MG/DL (ref 65–140)
HCO3 BLDV-SCNC: 21.5 MMOL/L (ref 24–30)
HCT VFR BLD AUTO: 30 % (ref 34.8–46.1)
HGB BLD-MCNC: 9.6 G/DL (ref 11.5–15.4)
HOROWITZ INDEX BLDA+IHG-RTO: 75 MM[HG]
LYMPHOCYTES # BLD AUTO: 1.02 THOUSAND/UL (ref 0.6–4.47)
LYMPHOCYTES # BLD AUTO: 6 % (ref 14–44)
MAGNESIUM SERPL-MCNC: 3 MG/DL (ref 1.6–2.6)
MCH RBC QN AUTO: 26.8 PG (ref 26.8–34.3)
MCHC RBC AUTO-ENTMCNC: 32 G/DL (ref 31.4–37.4)
MCV RBC AUTO: 84 FL (ref 82–98)
METAMYELOCYTES NFR BLD MANUAL: 4 % (ref 0–1)
MONOCYTES # BLD AUTO: 0.34 THOUSAND/UL (ref 0–1.22)
MONOCYTES NFR BLD: 2 % (ref 4–12)
MRSA NOSE QL CULT: NORMAL
NEUTROPHILS # BLD MANUAL: 14.45 THOUSAND/UL (ref 1.85–7.62)
NEUTS BAND NFR BLD MANUAL: 28 % (ref 0–8)
NEUTS SEG NFR BLD AUTO: 57 % (ref 43–75)
NRBC BLD AUTO-RTO: 1 /100 WBC (ref 0–2)
O2 CT BLDV-SCNC: 5 ML/DL
PCO2 BLD: 36.3 MM HG (ref 42–50)
PCO2 BLDV: 33.8 MM HG (ref 42–50)
PEEP RESPIRATORY: 6 CM[H2O]
PH BLD: 7.4 [PH] (ref 7.3–7.4)
PH BLDV: 7.42 [PH] (ref 7.3–7.4)
PHOSPHATE SERPL-MCNC: 7.6 MG/DL (ref 2.7–4.5)
PLATELET # BLD AUTO: 334 THOUSANDS/UL (ref 149–390)
PLATELET BLD QL SMEAR: ADEQUATE
PMV BLD AUTO: 10.5 FL (ref 8.9–12.7)
PO2 BLDV: 26 MM HG (ref 35–45)
PO2 VENOUS TEMP CORRECTED: 29.1 MM HG (ref 35–45)
POIKILOCYTOSIS BLD QL SMEAR: PRESENT
POLYCHROMASIA BLD QL SMEAR: PRESENT
POTASSIUM SERPL-SCNC: 3.3 MMOL/L (ref 3.5–5.3)
POTASSIUM SERPL-SCNC: 3.7 MMOL/L (ref 3.5–5.3)
POTASSIUM SERPL-SCNC: 3.9 MMOL/L (ref 3.5–5.3)
POTASSIUM SERPL-SCNC: 4 MMOL/L (ref 3.5–5.3)
PRESSURE CONTROL: 12
PROCALCITONIN SERPL-MCNC: 20.93 NG/ML
PROT SERPL-MCNC: 6.3 G/DL (ref 6.4–8.2)
RBC # BLD AUTO: 3.58 MILLION/UL (ref 3.81–5.12)
RBC MORPH BLD: PRESENT
SODIUM SERPL-SCNC: 129 MMOL/L (ref 136–145)
SODIUM SERPL-SCNC: 130 MMOL/L (ref 136–145)
SODIUM SERPL-SCNC: 131 MMOL/L (ref 136–145)
SODIUM SERPL-SCNC: 132 MMOL/L (ref 136–145)
TARGETS BLD QL SMEAR: PRESENT
VARIANT LYMPHS # BLD AUTO: 3 %
VENT- AC: AC
WBC # BLD AUTO: 17 THOUSAND/UL (ref 4.31–10.16)

## 2021-10-31 PROCEDURE — 80048 BASIC METABOLIC PNL TOTAL CA: CPT | Performed by: PHYSICIAN ASSISTANT

## 2021-10-31 PROCEDURE — 94640 AIRWAY INHALATION TREATMENT: CPT

## 2021-10-31 PROCEDURE — 99232 SBSQ HOSP IP/OBS MODERATE 35: CPT | Performed by: INTERNAL MEDICINE

## 2021-10-31 PROCEDURE — 82330 ASSAY OF CALCIUM: CPT | Performed by: PHYSICIAN ASSISTANT

## 2021-10-31 PROCEDURE — 84100 ASSAY OF PHOSPHORUS: CPT | Performed by: PHYSICIAN ASSISTANT

## 2021-10-31 PROCEDURE — 82948 REAGENT STRIP/BLOOD GLUCOSE: CPT

## 2021-10-31 PROCEDURE — 80076 HEPATIC FUNCTION PANEL: CPT | Performed by: PHYSICIAN ASSISTANT

## 2021-10-31 PROCEDURE — 71045 X-RAY EXAM CHEST 1 VIEW: CPT

## 2021-10-31 PROCEDURE — 84145 PROCALCITONIN (PCT): CPT | Performed by: STUDENT IN AN ORGANIZED HEALTH CARE EDUCATION/TRAINING PROGRAM

## 2021-10-31 PROCEDURE — 94003 VENT MGMT INPAT SUBQ DAY: CPT

## 2021-10-31 PROCEDURE — 94760 N-INVAS EAR/PLS OXIMETRY 1: CPT

## 2021-10-31 PROCEDURE — 85730 THROMBOPLASTIN TIME PARTIAL: CPT | Performed by: INTERNAL MEDICINE

## 2021-10-31 PROCEDURE — 85007 BL SMEAR W/DIFF WBC COUNT: CPT | Performed by: PHYSICIAN ASSISTANT

## 2021-10-31 PROCEDURE — 85027 COMPLETE CBC AUTOMATED: CPT | Performed by: PHYSICIAN ASSISTANT

## 2021-10-31 PROCEDURE — 82805 BLOOD GASES W/O2 SATURATION: CPT | Performed by: PHYSICIAN ASSISTANT

## 2021-10-31 PROCEDURE — 99223 1ST HOSP IP/OBS HIGH 75: CPT | Performed by: INTERNAL MEDICINE

## 2021-10-31 PROCEDURE — 99291 CRITICAL CARE FIRST HOUR: CPT | Performed by: PHYSICIAN ASSISTANT

## 2021-10-31 PROCEDURE — 83735 ASSAY OF MAGNESIUM: CPT | Performed by: PHYSICIAN ASSISTANT

## 2021-10-31 RX ORDER — POTASSIUM CHLORIDE 29.8 MG/ML
40 INJECTION INTRAVENOUS ONCE
Status: COMPLETED | OUTPATIENT
Start: 2021-10-31 | End: 2021-10-31

## 2021-10-31 RX ORDER — AMOXICILLIN 250 MG
2 CAPSULE ORAL 2 TIMES DAILY
Status: DISCONTINUED | OUTPATIENT
Start: 2021-10-31 | End: 2021-11-03

## 2021-10-31 RX ORDER — POTASSIUM CHLORIDE 14.9 MG/ML
20 INJECTION INTRAVENOUS ONCE
Status: COMPLETED | OUTPATIENT
Start: 2021-10-31 | End: 2021-10-31

## 2021-10-31 RX ADMIN — VASOPRESSIN 0.04 UNITS/MIN: 20 INJECTION INTRAVENOUS at 08:20

## 2021-10-31 RX ADMIN — PROPOFOL 20 MCG/KG/MIN: 10 INJECTION, EMULSION INTRAVENOUS at 03:00

## 2021-10-31 RX ADMIN — VANCOMYCIN HYDROCHLORIDE 1250 MG: 1 INJECTION, POWDER, LYOPHILIZED, FOR SOLUTION INTRAVENOUS at 09:52

## 2021-10-31 RX ADMIN — AMIODARONE HYDROCHLORIDE 1 MG/MIN: 50 INJECTION, SOLUTION INTRAVENOUS at 23:28

## 2021-10-31 RX ADMIN — IPRATROPIUM BROMIDE 0.5 MG: 0.5 SOLUTION RESPIRATORY (INHALATION) at 19:14

## 2021-10-31 RX ADMIN — IPRATROPIUM BROMIDE 0.5 MG: 0.5 SOLUTION RESPIRATORY (INHALATION) at 07:39

## 2021-10-31 RX ADMIN — ATORVASTATIN CALCIUM 40 MG: 40 TABLET, FILM COATED ORAL at 17:04

## 2021-10-31 RX ADMIN — HEPARIN SODIUM 13.1 UNITS/KG/HR: 10000 INJECTION, SOLUTION INTRAVENOUS at 08:19

## 2021-10-31 RX ADMIN — LEVALBUTEROL HYDROCHLORIDE 1.25 MG: 1.25 SOLUTION, CONCENTRATE RESPIRATORY (INHALATION) at 19:14

## 2021-10-31 RX ADMIN — CALCIUM GLUCONATE 3 G: 98 INJECTION, SOLUTION INTRAVENOUS at 08:48

## 2021-10-31 RX ADMIN — ACETAMINOPHEN 650 MG: 325 TABLET, FILM COATED ORAL at 08:26

## 2021-10-31 RX ADMIN — TICAGRELOR 90 MG: 90 TABLET ORAL at 08:26

## 2021-10-31 RX ADMIN — POTASSIUM CHLORIDE 40 MEQ: 29.8 INJECTION, SOLUTION INTRAVENOUS at 05:26

## 2021-10-31 RX ADMIN — IPRATROPIUM BROMIDE 0.5 MG: 0.5 SOLUTION RESPIRATORY (INHALATION) at 14:18

## 2021-10-31 RX ADMIN — Medication 20 MG/HR: at 08:17

## 2021-10-31 RX ADMIN — ACETAMINOPHEN 650 MG: 325 TABLET, FILM COATED ORAL at 00:40

## 2021-10-31 RX ADMIN — POTASSIUM CHLORIDE 20 MEQ: 14.9 INJECTION, SOLUTION INTRAVENOUS at 14:20

## 2021-10-31 RX ADMIN — TICAGRELOR 90 MG: 90 TABLET ORAL at 20:31

## 2021-10-31 RX ADMIN — MILRINONE LACTATE IN DEXTROSE 0.13 MCG/KG/MIN: 200 INJECTION, SOLUTION INTRAVENOUS at 02:32

## 2021-10-31 RX ADMIN — LEVALBUTEROL HYDROCHLORIDE 1.25 MG: 1.25 SOLUTION, CONCENTRATE RESPIRATORY (INHALATION) at 07:39

## 2021-10-31 RX ADMIN — VASOPRESSIN 0.04 UNITS/MIN: 20 INJECTION INTRAVENOUS at 02:32

## 2021-10-31 RX ADMIN — CHLORHEXIDINE GLUCONATE 15 ML: 1.2 SOLUTION ORAL at 08:26

## 2021-10-31 RX ADMIN — PROPOFOL 10 MCG/KG/MIN: 10 INJECTION, EMULSION INTRAVENOUS at 08:53

## 2021-10-31 RX ADMIN — Medication 20 MG: at 05:26

## 2021-10-31 RX ADMIN — DICYCLOMINE HYDROCHLORIDE 20 MG: 20 TABLET ORAL at 05:25

## 2021-10-31 RX ADMIN — LEVALBUTEROL HYDROCHLORIDE 1.25 MG: 1.25 SOLUTION, CONCENTRATE RESPIRATORY (INHALATION) at 14:18

## 2021-10-31 RX ADMIN — POTASSIUM CHLORIDE 20 MEQ: 14.9 INJECTION, SOLUTION INTRAVENOUS at 08:23

## 2021-10-31 RX ADMIN — NOREPINEPHRINE BITARTRATE 2 MCG/MIN: 1 INJECTION, SOLUTION, CONCENTRATE INTRAVENOUS at 10:12

## 2021-10-31 RX ADMIN — AMIODARONE HYDROCHLORIDE 1 MG/MIN: 50 INJECTION, SOLUTION INTRAVENOUS at 08:21

## 2021-10-31 RX ADMIN — CHLORHEXIDINE GLUCONATE 15 ML: 1.2 SOLUTION ORAL at 20:31

## 2021-10-31 RX ADMIN — CEFEPIME HYDROCHLORIDE 2000 MG: 2 INJECTION, POWDER, FOR SOLUTION INTRAVENOUS at 20:32

## 2021-10-31 RX ADMIN — FENTANYL CITRATE 100 MCG/HR: 50 INJECTION INTRAMUSCULAR; INTRAVENOUS at 13:09

## 2021-10-31 RX ADMIN — DICYCLOMINE HYDROCHLORIDE 20 MG: 20 TABLET ORAL at 00:30

## 2021-10-31 RX ADMIN — CEFEPIME HYDROCHLORIDE 1000 MG: 1 INJECTION, POWDER, FOR SOLUTION INTRAMUSCULAR; INTRAVENOUS at 08:43

## 2021-10-31 RX ADMIN — ASPIRIN 81 MG CHEWABLE TABLET 81 MG: 81 TABLET CHEWABLE at 08:26

## 2021-10-31 RX ADMIN — FENTANYL CITRATE 100 MCG/HR: 50 INJECTION INTRAMUSCULAR; INTRAVENOUS at 01:56

## 2021-11-01 LAB
ALBUMIN SERPL BCP-MCNC: 1.2 G/DL (ref 3.5–5)
ALP SERPL-CCNC: 105 U/L (ref 46–116)
ALT SERPL W P-5'-P-CCNC: 45 U/L (ref 12–78)
ANION GAP SERPL CALCULATED.3IONS-SCNC: 6 MMOL/L (ref 4–13)
ANION GAP SERPL CALCULATED.3IONS-SCNC: 7 MMOL/L (ref 4–13)
ANION GAP SERPL CALCULATED.3IONS-SCNC: 8 MMOL/L (ref 4–13)
ANION GAP SERPL CALCULATED.3IONS-SCNC: 9 MMOL/L (ref 4–13)
APTT PPP: 65 SECONDS (ref 23–37)
AST SERPL W P-5'-P-CCNC: 74 U/L (ref 5–45)
BACTERIA UR CULT: ABNORMAL
BASE EX.OXY STD BLDV CALC-SCNC: 44.9 % (ref 60–80)
BASE EX.OXY STD BLDV CALC-SCNC: 56.5 % (ref 60–80)
BASE EXCESS BLDV CALC-SCNC: -0.7 MMOL/L
BASE EXCESS BLDV CALC-SCNC: 0.3 MMOL/L
BILIRUB DIRECT SERPL-MCNC: 0.33 MG/DL (ref 0–0.2)
BILIRUB SERPL-MCNC: 0.55 MG/DL (ref 0.2–1)
BODY TEMPERATURE: 101.1 DEGREES FEHRENHEIT
BUN SERPL-MCNC: 65 MG/DL (ref 5–25)
BUN SERPL-MCNC: 67 MG/DL (ref 5–25)
BUN SERPL-MCNC: 67 MG/DL (ref 5–25)
BUN SERPL-MCNC: 69 MG/DL (ref 5–25)
CALCIUM SERPL-MCNC: 8.1 MG/DL (ref 8.3–10.1)
CALCIUM SERPL-MCNC: 8.1 MG/DL (ref 8.3–10.1)
CALCIUM SERPL-MCNC: 8.2 MG/DL (ref 8.3–10.1)
CALCIUM SERPL-MCNC: 8.4 MG/DL (ref 8.3–10.1)
CHLORIDE SERPL-SCNC: 100 MMOL/L (ref 100–108)
CHLORIDE SERPL-SCNC: 98 MMOL/L (ref 100–108)
CHLORIDE SERPL-SCNC: 99 MMOL/L (ref 100–108)
CHLORIDE SERPL-SCNC: 99 MMOL/L (ref 100–108)
CO2 SERPL-SCNC: 22 MMOL/L (ref 21–32)
CO2 SERPL-SCNC: 23 MMOL/L (ref 21–32)
CO2 SERPL-SCNC: 25 MMOL/L (ref 21–32)
CO2 SERPL-SCNC: 25 MMOL/L (ref 21–32)
CREAT SERPL-MCNC: 1.92 MG/DL (ref 0.6–1.3)
CREAT SERPL-MCNC: 1.95 MG/DL (ref 0.6–1.3)
CREAT SERPL-MCNC: 2.03 MG/DL (ref 0.6–1.3)
CREAT SERPL-MCNC: 2.04 MG/DL (ref 0.6–1.3)
ERYTHROCYTE [DISTWIDTH] IN BLOOD BY AUTOMATED COUNT: 16.3 % (ref 11.6–15.1)
GFR SERPL CREATININE-BSD FRML MDRD: 27 ML/MIN/1.73SQ M
GFR SERPL CREATININE-BSD FRML MDRD: 27 ML/MIN/1.73SQ M
GFR SERPL CREATININE-BSD FRML MDRD: 28 ML/MIN/1.73SQ M
GFR SERPL CREATININE-BSD FRML MDRD: 29 ML/MIN/1.73SQ M
GLUCOSE SERPL-MCNC: 110 MG/DL (ref 65–140)
GLUCOSE SERPL-MCNC: 111 MG/DL (ref 65–140)
GLUCOSE SERPL-MCNC: 114 MG/DL (ref 65–140)
GLUCOSE SERPL-MCNC: 117 MG/DL (ref 65–140)
GLUCOSE SERPL-MCNC: 128 MG/DL (ref 65–140)
GLUCOSE SERPL-MCNC: 142 MG/DL (ref 65–140)
GLUCOSE SERPL-MCNC: 145 MG/DL (ref 65–140)
GLUCOSE SERPL-MCNC: 147 MG/DL (ref 65–140)
GLUCOSE SERPL-MCNC: 149 MG/DL (ref 65–140)
GLUCOSE SERPL-MCNC: 151 MG/DL (ref 65–140)
GLUCOSE SERPL-MCNC: 154 MG/DL (ref 65–140)
GLUCOSE SERPL-MCNC: 156 MG/DL (ref 65–140)
GLUCOSE SERPL-MCNC: 161 MG/DL (ref 65–140)
GLUCOSE SERPL-MCNC: 178 MG/DL (ref 65–140)
GLUCOSE SERPL-MCNC: 193 MG/DL (ref 65–140)
GLUCOSE SERPL-MCNC: 97 MG/DL (ref 65–140)
HCO3 BLDV-SCNC: 23.6 MMOL/L (ref 24–30)
HCO3 BLDV-SCNC: 24.8 MMOL/L (ref 24–30)
HCT VFR BLD AUTO: 24.1 % (ref 34.8–46.1)
HGB BLD-MCNC: 7.7 G/DL (ref 11.5–15.4)
HOROWITZ INDEX BLDA+IHG-RTO: 75 MM[HG]
LIDOCAIN SERPL-MCNC: 3.3 UG/ML (ref 1.5–5)
MCH RBC QN AUTO: 27.1 PG (ref 26.8–34.3)
MCHC RBC AUTO-ENTMCNC: 32 G/DL (ref 31.4–37.4)
MCV RBC AUTO: 85 FL (ref 82–98)
NRBC BLD AUTO-RTO: 0 /100 WBCS
O2 CT BLDV-SCNC: 4.8 ML/DL
O2 CT BLDV-SCNC: 5.8 ML/DL
PCO2 BLD: 41.4 MM HG (ref 42–50)
PCO2 BLDV: 36.9 MM HG (ref 42–50)
PCO2 BLDV: 38.9 MM HG (ref 42–50)
PEEP RESPIRATORY: 8 CM[H2O]
PH BLD: 7.4 [PH] (ref 7.3–7.4)
PH BLDV: 7.42 [PH] (ref 7.3–7.4)
PH BLDV: 7.42 [PH] (ref 7.3–7.4)
PLATELET # BLD AUTO: 369 THOUSANDS/UL (ref 149–390)
PMV BLD AUTO: 10.6 FL (ref 8.9–12.7)
PO2 BLDV: 27.6 MM HG (ref 35–45)
PO2 BLDV: 31.2 MM HG (ref 35–45)
PO2 VENOUS TEMP CORRECTED: 30.5 MM HG (ref 35–45)
POTASSIUM SERPL-SCNC: 3.6 MMOL/L (ref 3.5–5.3)
POTASSIUM SERPL-SCNC: 3.7 MMOL/L (ref 3.5–5.3)
POTASSIUM SERPL-SCNC: 3.8 MMOL/L (ref 3.5–5.3)
POTASSIUM SERPL-SCNC: 3.9 MMOL/L (ref 3.5–5.3)
PRESSURE CONTROL: 12
PROCALCITONIN SERPL-MCNC: 26.9 NG/ML
PROT SERPL-MCNC: 6.1 G/DL (ref 6.4–8.2)
PS CM H2O: 12
PS VENT FIO2: 60
PS VENT PEEP: 8
RBC # BLD AUTO: 2.84 MILLION/UL (ref 3.81–5.12)
SODIUM SERPL-SCNC: 130 MMOL/L (ref 136–145)
SODIUM SERPL-SCNC: 131 MMOL/L (ref 136–145)
VENT - PS: ABNORMAL
VENT- AC: AC
WBC # BLD AUTO: 18.93 THOUSAND/UL (ref 4.31–10.16)

## 2021-11-01 PROCEDURE — 85730 THROMBOPLASTIN TIME PARTIAL: CPT | Performed by: INTERNAL MEDICINE

## 2021-11-01 PROCEDURE — 80048 BASIC METABOLIC PNL TOTAL CA: CPT | Performed by: PHYSICIAN ASSISTANT

## 2021-11-01 PROCEDURE — 99292 CRITICAL CARE ADDL 30 MIN: CPT | Performed by: STUDENT IN AN ORGANIZED HEALTH CARE EDUCATION/TRAINING PROGRAM

## 2021-11-01 PROCEDURE — 80076 HEPATIC FUNCTION PANEL: CPT | Performed by: STUDENT IN AN ORGANIZED HEALTH CARE EDUCATION/TRAINING PROGRAM

## 2021-11-01 PROCEDURE — 99233 SBSQ HOSP IP/OBS HIGH 50: CPT | Performed by: INTERNAL MEDICINE

## 2021-11-01 PROCEDURE — 99291 CRITICAL CARE FIRST HOUR: CPT | Performed by: PHYSICIAN ASSISTANT

## 2021-11-01 PROCEDURE — 82805 BLOOD GASES W/O2 SATURATION: CPT | Performed by: PHYSICIAN ASSISTANT

## 2021-11-01 PROCEDURE — 99232 SBSQ HOSP IP/OBS MODERATE 35: CPT | Performed by: INTERNAL MEDICINE

## 2021-11-01 PROCEDURE — 84145 PROCALCITONIN (PCT): CPT | Performed by: STUDENT IN AN ORGANIZED HEALTH CARE EDUCATION/TRAINING PROGRAM

## 2021-11-01 PROCEDURE — 94640 AIRWAY INHALATION TREATMENT: CPT

## 2021-11-01 PROCEDURE — 94760 N-INVAS EAR/PLS OXIMETRY 1: CPT

## 2021-11-01 PROCEDURE — 85027 COMPLETE CBC AUTOMATED: CPT | Performed by: INTERNAL MEDICINE

## 2021-11-01 PROCEDURE — 82948 REAGENT STRIP/BLOOD GLUCOSE: CPT

## 2021-11-01 PROCEDURE — 82805 BLOOD GASES W/O2 SATURATION: CPT | Performed by: STUDENT IN AN ORGANIZED HEALTH CARE EDUCATION/TRAINING PROGRAM

## 2021-11-01 PROCEDURE — 94003 VENT MGMT INPAT SUBQ DAY: CPT

## 2021-11-01 RX ORDER — POTASSIUM CHLORIDE 29.8 MG/ML
40 INJECTION INTRAVENOUS ONCE
Status: COMPLETED | OUTPATIENT
Start: 2021-11-01 | End: 2021-11-01

## 2021-11-01 RX ORDER — METOLAZONE 5 MG/1
10 TABLET ORAL ONCE
Status: COMPLETED | OUTPATIENT
Start: 2021-11-01 | End: 2021-11-01

## 2021-11-01 RX ORDER — ACETAMINOPHEN 160 MG/5ML
975 SUSPENSION, ORAL (FINAL DOSE FORM) ORAL EVERY 6 HOURS
Status: DISCONTINUED | OUTPATIENT
Start: 2021-11-01 | End: 2021-11-03

## 2021-11-01 RX ADMIN — LEVALBUTEROL HYDROCHLORIDE 1.25 MG: 1.25 SOLUTION, CONCENTRATE RESPIRATORY (INHALATION) at 13:11

## 2021-11-01 RX ADMIN — Medication 30 MG/HR: at 01:00

## 2021-11-01 RX ADMIN — ACETAMINOPHEN 650 MG: 650 SUSPENSION ORAL at 04:58

## 2021-11-01 RX ADMIN — DICYCLOMINE HYDROCHLORIDE 20 MG: 20 TABLET ORAL at 00:21

## 2021-11-01 RX ADMIN — LEVALBUTEROL HYDROCHLORIDE 1.25 MG: 1.25 SOLUTION, CONCENTRATE RESPIRATORY (INHALATION) at 07:38

## 2021-11-01 RX ADMIN — ACETAMINOPHEN 975 MG: 650 SUSPENSION ORAL at 18:11

## 2021-11-01 RX ADMIN — IPRATROPIUM BROMIDE 0.5 MG: 0.5 SOLUTION RESPIRATORY (INHALATION) at 13:11

## 2021-11-01 RX ADMIN — SODIUM CHLORIDE 3 G: 9 INJECTION, SOLUTION INTRAVENOUS at 16:11

## 2021-11-01 RX ADMIN — AMIODARONE HYDROCHLORIDE 1 MG/MIN: 50 INJECTION, SOLUTION INTRAVENOUS at 15:11

## 2021-11-01 RX ADMIN — TICAGRELOR 90 MG: 90 TABLET ORAL at 08:13

## 2021-11-01 RX ADMIN — DICYCLOMINE HYDROCHLORIDE 20 MG: 20 TABLET ORAL at 11:55

## 2021-11-01 RX ADMIN — MILRINONE LACTATE IN DEXTROSE 0.13 MCG/KG/MIN: 200 INJECTION, SOLUTION INTRAVENOUS at 01:23

## 2021-11-01 RX ADMIN — DICYCLOMINE HYDROCHLORIDE 20 MG: 20 TABLET ORAL at 22:42

## 2021-11-01 RX ADMIN — Medication 20 MG: at 05:06

## 2021-11-01 RX ADMIN — CHLORHEXIDINE GLUCONATE 15 ML: 1.2 SOLUTION ORAL at 21:17

## 2021-11-01 RX ADMIN — IPRATROPIUM BROMIDE 0.5 MG: 0.5 SOLUTION RESPIRATORY (INHALATION) at 07:38

## 2021-11-01 RX ADMIN — DICYCLOMINE HYDROCHLORIDE 20 MG: 20 TABLET ORAL at 04:58

## 2021-11-01 RX ADMIN — ATORVASTATIN CALCIUM 40 MG: 40 TABLET, FILM COATED ORAL at 16:11

## 2021-11-01 RX ADMIN — DOCUSATE SODIUM AND SENNOSIDES 2 TABLET: 8.6; 5 TABLET ORAL at 08:13

## 2021-11-01 RX ADMIN — METOLAZONE 10 MG: 5 TABLET ORAL at 09:16

## 2021-11-01 RX ADMIN — ASPIRIN 81 MG CHEWABLE TABLET 81 MG: 81 TABLET CHEWABLE at 08:13

## 2021-11-01 RX ADMIN — ACETAMINOPHEN 975 MG: 650 SUSPENSION ORAL at 11:55

## 2021-11-01 RX ADMIN — DOCUSATE SODIUM AND SENNOSIDES 2 TABLET: 8.6; 5 TABLET ORAL at 18:11

## 2021-11-01 RX ADMIN — LEVALBUTEROL HYDROCHLORIDE 1.25 MG: 1.25 SOLUTION, CONCENTRATE RESPIRATORY (INHALATION) at 19:49

## 2021-11-01 RX ADMIN — SODIUM CHLORIDE 12 UNITS/HR: 9 INJECTION, SOLUTION INTRAVENOUS at 21:17

## 2021-11-01 RX ADMIN — POTASSIUM CHLORIDE 40 MEQ: 29.8 INJECTION, SOLUTION INTRAVENOUS at 08:34

## 2021-11-01 RX ADMIN — HEPARIN SODIUM 13.1 UNITS/KG/HR: 10000 INJECTION, SOLUTION INTRAVENOUS at 06:30

## 2021-11-01 RX ADMIN — FENTANYL CITRATE 100 MCG/HR: 50 INJECTION INTRAMUSCULAR; INTRAVENOUS at 11:51

## 2021-11-01 RX ADMIN — IPRATROPIUM BROMIDE 0.5 MG: 0.5 SOLUTION RESPIRATORY (INHALATION) at 19:49

## 2021-11-01 RX ADMIN — FENTANYL CITRATE 100 MCG/HR: 50 INJECTION INTRAMUSCULAR; INTRAVENOUS at 00:18

## 2021-11-01 RX ADMIN — SODIUM CHLORIDE 8 UNITS/HR: 9 INJECTION, SOLUTION INTRAVENOUS at 02:38

## 2021-11-01 RX ADMIN — Medication 40 MG/HR: at 14:58

## 2021-11-01 RX ADMIN — VANCOMYCIN HYDROCHLORIDE 1250 MG: 1 INJECTION, POWDER, LYOPHILIZED, FOR SOLUTION INTRAVENOUS at 09:14

## 2021-11-01 RX ADMIN — SODIUM CHLORIDE 3 G: 9 INJECTION, SOLUTION INTRAVENOUS at 21:17

## 2021-11-01 RX ADMIN — FENTANYL CITRATE 100 MCG/HR: 50 INJECTION INTRAMUSCULAR; INTRAVENOUS at 23:23

## 2021-11-01 RX ADMIN — DICYCLOMINE HYDROCHLORIDE 20 MG: 20 TABLET ORAL at 18:12

## 2021-11-01 RX ADMIN — CEFEPIME HYDROCHLORIDE 2000 MG: 2 INJECTION, POWDER, FOR SOLUTION INTRAVENOUS at 08:13

## 2021-11-01 RX ADMIN — CHLORHEXIDINE GLUCONATE 15 ML: 1.2 SOLUTION ORAL at 08:13

## 2021-11-01 RX ADMIN — TICAGRELOR 90 MG: 90 TABLET ORAL at 21:17

## 2021-11-01 RX ADMIN — POTASSIUM CHLORIDE 40 MEQ: 29.8 INJECTION, SOLUTION INTRAVENOUS at 13:43

## 2021-11-02 ENCOUNTER — APPOINTMENT (INPATIENT)
Dept: RADIOLOGY | Facility: HOSPITAL | Age: 56
DRG: 003 | End: 2021-11-02
Payer: MEDICARE

## 2021-11-02 LAB
ABO GROUP BLD: NORMAL
ABO GROUP BLD: NORMAL
ALBUMIN SERPL BCP-MCNC: 1.2 G/DL (ref 3.5–5)
ALP SERPL-CCNC: 128 U/L (ref 46–116)
ALT SERPL W P-5'-P-CCNC: 41 U/L (ref 12–78)
ANION GAP SERPL CALCULATED.3IONS-SCNC: 10 MMOL/L (ref 4–13)
ANION GAP SERPL CALCULATED.3IONS-SCNC: 6 MMOL/L (ref 4–13)
ANION GAP SERPL CALCULATED.3IONS-SCNC: 7 MMOL/L (ref 4–13)
ANION GAP SERPL CALCULATED.3IONS-SCNC: 7 MMOL/L (ref 4–13)
ANION GAP SERPL CALCULATED.3IONS-SCNC: 8 MMOL/L (ref 4–13)
ANION GAP SERPL CALCULATED.3IONS-SCNC: 8 MMOL/L (ref 4–13)
ANISOCYTOSIS BLD QL SMEAR: PRESENT
APTT PPP: 61 SECONDS (ref 23–37)
AST SERPL W P-5'-P-CCNC: 50 U/L (ref 5–45)
BACTERIA SPT RESP CULT: ABNORMAL
BACTERIA SPT RESP CULT: ABNORMAL
BASE EX.OXY STD BLDV CALC-SCNC: 52 % (ref 60–80)
BASE EX.OXY STD BLDV CALC-SCNC: 55.3 % (ref 60–80)
BASE EX.OXY STD BLDV CALC-SCNC: 59.1 % (ref 60–80)
BASE EXCESS BLDA CALC-SCNC: 0.7 MMOL/L
BASE EXCESS BLDV CALC-SCNC: 0.5 MMOL/L
BASE EXCESS BLDV CALC-SCNC: 1.7 MMOL/L
BASE EXCESS BLDV CALC-SCNC: 4 MMOL/L
BASOPHILS # BLD MANUAL: 0 THOUSAND/UL (ref 0–0.1)
BASOPHILS NFR MAR MANUAL: 0 % (ref 0–1)
BILIRUB DIRECT SERPL-MCNC: 0.3 MG/DL (ref 0–0.2)
BILIRUB SERPL-MCNC: 0.49 MG/DL (ref 0.2–1)
BLD GP AB SCN SERPL QL: NEGATIVE
BUN SERPL-MCNC: 64 MG/DL (ref 5–25)
BUN SERPL-MCNC: 65 MG/DL (ref 5–25)
BUN SERPL-MCNC: 66 MG/DL (ref 5–25)
BUN SERPL-MCNC: 68 MG/DL (ref 5–25)
CA-I BLD-SCNC: 0.93 MMOL/L (ref 1.12–1.32)
CA-I BLD-SCNC: 1.03 MMOL/L (ref 1.12–1.32)
CA-I BLD-SCNC: 1.1 MMOL/L (ref 1.12–1.32)
CA-I BLD-SCNC: 1.18 MMOL/L (ref 1.12–1.32)
CALCIUM SERPL-MCNC: 8.3 MG/DL (ref 8.3–10.1)
CALCIUM SERPL-MCNC: 8.4 MG/DL (ref 8.3–10.1)
CALCIUM SERPL-MCNC: 8.6 MG/DL (ref 8.3–10.1)
CALCIUM SERPL-MCNC: 9.2 MG/DL (ref 8.3–10.1)
CALCIUM SERPL-MCNC: 9.2 MG/DL (ref 8.3–10.1)
CALCIUM SERPL-MCNC: 9.9 MG/DL (ref 8.3–10.1)
CHLORIDE SERPL-SCNC: 101 MMOL/L (ref 100–108)
CHLORIDE SERPL-SCNC: 102 MMOL/L (ref 100–108)
CHLORIDE SERPL-SCNC: 98 MMOL/L (ref 100–108)
CHLORIDE SERPL-SCNC: 99 MMOL/L (ref 100–108)
CO2 SERPL-SCNC: 24 MMOL/L (ref 21–32)
CO2 SERPL-SCNC: 25 MMOL/L (ref 21–32)
CO2 SERPL-SCNC: 25 MMOL/L (ref 21–32)
CO2 SERPL-SCNC: 26 MMOL/L (ref 21–32)
CO2 SERPL-SCNC: 26 MMOL/L (ref 21–32)
CO2 SERPL-SCNC: 28 MMOL/L (ref 21–32)
CREAT SERPL-MCNC: 1.77 MG/DL (ref 0.6–1.3)
CREAT SERPL-MCNC: 1.82 MG/DL (ref 0.6–1.3)
CREAT SERPL-MCNC: 1.85 MG/DL (ref 0.6–1.3)
CREAT SERPL-MCNC: 1.87 MG/DL (ref 0.6–1.3)
CREAT SERPL-MCNC: 1.91 MG/DL (ref 0.6–1.3)
CREAT SERPL-MCNC: 1.92 MG/DL (ref 0.6–1.3)
EOSINOPHIL # BLD MANUAL: 0.29 THOUSAND/UL (ref 0–0.4)
EOSINOPHIL NFR BLD MANUAL: 1 % (ref 0–6)
ERYTHROCYTE [DISTWIDTH] IN BLOOD BY AUTOMATED COUNT: 16.5 % (ref 11.6–15.1)
ERYTHROCYTE [DISTWIDTH] IN BLOOD BY AUTOMATED COUNT: 16.6 % (ref 11.6–15.1)
GFR SERPL CREATININE-BSD FRML MDRD: 29 ML/MIN/1.73SQ M
GFR SERPL CREATININE-BSD FRML MDRD: 29 ML/MIN/1.73SQ M
GFR SERPL CREATININE-BSD FRML MDRD: 30 ML/MIN/1.73SQ M
GFR SERPL CREATININE-BSD FRML MDRD: 30 ML/MIN/1.73SQ M
GFR SERPL CREATININE-BSD FRML MDRD: 31 ML/MIN/1.73SQ M
GFR SERPL CREATININE-BSD FRML MDRD: 32 ML/MIN/1.73SQ M
GLUCOSE SERPL-MCNC: 103 MG/DL (ref 65–140)
GLUCOSE SERPL-MCNC: 103 MG/DL (ref 65–140)
GLUCOSE SERPL-MCNC: 108 MG/DL (ref 65–140)
GLUCOSE SERPL-MCNC: 111 MG/DL (ref 65–140)
GLUCOSE SERPL-MCNC: 113 MG/DL (ref 65–140)
GLUCOSE SERPL-MCNC: 122 MG/DL (ref 65–140)
GLUCOSE SERPL-MCNC: 122 MG/DL (ref 65–140)
GLUCOSE SERPL-MCNC: 123 MG/DL (ref 65–140)
GLUCOSE SERPL-MCNC: 128 MG/DL (ref 65–140)
GLUCOSE SERPL-MCNC: 128 MG/DL (ref 65–140)
GLUCOSE SERPL-MCNC: 133 MG/DL (ref 65–140)
GLUCOSE SERPL-MCNC: 133 MG/DL (ref 65–140)
GLUCOSE SERPL-MCNC: 141 MG/DL (ref 65–140)
GLUCOSE SERPL-MCNC: 173 MG/DL (ref 65–140)
GLUCOSE SERPL-MCNC: 181 MG/DL (ref 65–140)
GLUCOSE SERPL-MCNC: 197 MG/DL (ref 65–140)
GLUCOSE SERPL-MCNC: 206 MG/DL (ref 65–140)
GLUCOSE SERPL-MCNC: 210 MG/DL (ref 65–140)
GLUCOSE SERPL-MCNC: 219 MG/DL (ref 65–140)
GLUCOSE SERPL-MCNC: 98 MG/DL (ref 65–140)
GRAM STN SPEC: ABNORMAL
HCO3 BLDA-SCNC: 25 MMOL/L (ref 22–28)
HCO3 BLDV-SCNC: 25 MMOL/L (ref 24–30)
HCO3 BLDV-SCNC: 25.2 MMOL/L (ref 24–30)
HCO3 BLDV-SCNC: 28.2 MMOL/L (ref 24–30)
HCT VFR BLD AUTO: 21.2 % (ref 34.8–46.1)
HCT VFR BLD AUTO: 22.2 % (ref 34.8–46.1)
HGB BLD-MCNC: 6.7 G/DL (ref 11.5–15.4)
HGB BLD-MCNC: 7 G/DL (ref 11.5–15.4)
HGB BLD-MCNC: 7.7 G/DL (ref 11.5–15.4)
HGB BLD-MCNC: 7.9 G/DL (ref 11.5–15.4)
HYPERCHROMIA BLD QL SMEAR: PRESENT
LACTATE SERPL-SCNC: 1.6 MMOL/L (ref 0.5–2)
LYMPHOCYTES # BLD AUTO: 0.57 THOUSAND/UL (ref 0.6–4.47)
LYMPHOCYTES # BLD AUTO: 2 % (ref 14–44)
MAGNESIUM SERPL-MCNC: 2.3 MG/DL (ref 1.6–2.6)
MAGNESIUM SERPL-MCNC: 2.4 MG/DL (ref 1.6–2.6)
MCH RBC QN AUTO: 26.8 PG (ref 26.8–34.3)
MCH RBC QN AUTO: 27 PG (ref 26.8–34.3)
MCHC RBC AUTO-ENTMCNC: 31.5 G/DL (ref 31.4–37.4)
MCHC RBC AUTO-ENTMCNC: 31.6 G/DL (ref 31.4–37.4)
MCV RBC AUTO: 85 FL (ref 82–98)
MCV RBC AUTO: 86 FL (ref 82–98)
METAMYELOCYTES NFR BLD MANUAL: 1 % (ref 0–1)
MONOCYTES # BLD AUTO: 0.86 THOUSAND/UL (ref 0–1.22)
MONOCYTES NFR BLD: 3 % (ref 4–12)
NEUTROPHILS # BLD MANUAL: 26.64 THOUSAND/UL (ref 1.85–7.62)
NEUTS BAND NFR BLD MANUAL: 7 % (ref 0–8)
NEUTS SEG NFR BLD AUTO: 86 % (ref 43–75)
O2 CT BLDA-SCNC: 9.8 ML/DL (ref 16–23)
O2 CT BLDV-SCNC: 5.4 ML/DL
O2 CT BLDV-SCNC: 6.8 ML/DL
O2 CT BLDV-SCNC: 7.2 ML/DL
OXYHGB MFR BLDA: 96.5 % (ref 94–97)
PCO2 BLDA: 38.5 MM HG (ref 36–44)
PCO2 BLDV: 34.8 MM HG (ref 42–50)
PCO2 BLDV: 39.5 MM HG (ref 42–50)
PCO2 BLDV: 40.6 MM HG (ref 42–50)
PH BLDA: 7.43 [PH] (ref 7.35–7.45)
PH BLDV: 7.42 [PH] (ref 7.3–7.4)
PH BLDV: 7.46 [PH] (ref 7.3–7.4)
PH BLDV: 7.48 [PH] (ref 7.3–7.4)
PHOSPHATE SERPL-MCNC: 4.4 MG/DL (ref 2.7–4.5)
PLATELET # BLD AUTO: 458 THOUSANDS/UL (ref 149–390)
PLATELET # BLD AUTO: 465 THOUSANDS/UL (ref 149–390)
PLATELET BLD QL SMEAR: ABNORMAL
PMV BLD AUTO: 10.1 FL (ref 8.9–12.7)
PMV BLD AUTO: 10.2 FL (ref 8.9–12.7)
PO2 BLDA: 100.4 MM HG (ref 75–129)
PO2 BLDV: 29.7 MM HG (ref 35–45)
PO2 BLDV: 29.8 MM HG (ref 35–45)
PO2 BLDV: 30.7 MM HG (ref 35–45)
POLYCHROMASIA BLD QL SMEAR: PRESENT
POTASSIUM SERPL-SCNC: 3.2 MMOL/L (ref 3.5–5.3)
POTASSIUM SERPL-SCNC: 3.3 MMOL/L (ref 3.5–5.3)
POTASSIUM SERPL-SCNC: 3.4 MMOL/L (ref 3.5–5.3)
POTASSIUM SERPL-SCNC: 3.4 MMOL/L (ref 3.5–5.3)
POTASSIUM SERPL-SCNC: 4.1 MMOL/L (ref 3.5–5.3)
POTASSIUM SERPL-SCNC: 4.4 MMOL/L (ref 3.5–5.3)
PROCALCITONIN SERPL-MCNC: 17.4 NG/ML
PROCALCITONIN SERPL-MCNC: 18.71 NG/ML
PROT SERPL-MCNC: 6.4 G/DL (ref 6.4–8.2)
PS CM H2O: 12
PS VENT FIO2: 50
PS VENT FIO2: 50
PS VENT FIO2: 60
PS VENT PEEP: 8
RBC # BLD AUTO: 2.5 MILLION/UL (ref 3.81–5.12)
RBC # BLD AUTO: 2.59 MILLION/UL (ref 3.81–5.12)
RBC MORPH BLD: PRESENT
RH BLD: POSITIVE
RH BLD: POSITIVE
SODIUM SERPL-SCNC: 132 MMOL/L (ref 136–145)
SODIUM SERPL-SCNC: 132 MMOL/L (ref 136–145)
SODIUM SERPL-SCNC: 133 MMOL/L (ref 136–145)
SODIUM SERPL-SCNC: 134 MMOL/L (ref 136–145)
SODIUM SERPL-SCNC: 135 MMOL/L (ref 136–145)
SODIUM SERPL-SCNC: 136 MMOL/L (ref 136–145)
SPECIMEN EXPIRATION DATE: NORMAL
SPECIMEN SOURCE: ABNORMAL
TSH SERPL DL<=0.05 MIU/L-ACNC: 0.38 UIU/ML (ref 0.36–3.74)
VANCOMYCIN TROUGH SERPL-MCNC: 25 UG/ML (ref 10–20)
VENT - PS: ABNORMAL
WBC # BLD AUTO: 25.19 THOUSAND/UL (ref 4.31–10.16)
WBC # BLD AUTO: 28.65 THOUSAND/UL (ref 4.31–10.16)

## 2021-11-02 PROCEDURE — P9016 RBC LEUKOCYTES REDUCED: HCPCS

## 2021-11-02 PROCEDURE — 85730 THROMBOPLASTIN TIME PARTIAL: CPT | Performed by: INTERNAL MEDICINE

## 2021-11-02 PROCEDURE — 83735 ASSAY OF MAGNESIUM: CPT | Performed by: PHYSICIAN ASSISTANT

## 2021-11-02 PROCEDURE — 99291 CRITICAL CARE FIRST HOUR: CPT | Performed by: STUDENT IN AN ORGANIZED HEALTH CARE EDUCATION/TRAINING PROGRAM

## 2021-11-02 PROCEDURE — 86901 BLOOD TYPING SEROLOGIC RH(D): CPT | Performed by: NURSE PRACTITIONER

## 2021-11-02 PROCEDURE — 30233N1 TRANSFUSION OF NONAUTOLOGOUS RED BLOOD CELLS INTO PERIPHERAL VEIN, PERCUTANEOUS APPROACH: ICD-10-PCS | Performed by: ANESTHESIOLOGY

## 2021-11-02 PROCEDURE — 83735 ASSAY OF MAGNESIUM: CPT | Performed by: NURSE PRACTITIONER

## 2021-11-02 PROCEDURE — 99232 SBSQ HOSP IP/OBS MODERATE 35: CPT | Performed by: INTERNAL MEDICINE

## 2021-11-02 PROCEDURE — 83605 ASSAY OF LACTIC ACID: CPT | Performed by: STUDENT IN AN ORGANIZED HEALTH CARE EDUCATION/TRAINING PROGRAM

## 2021-11-02 PROCEDURE — 82948 REAGENT STRIP/BLOOD GLUCOSE: CPT

## 2021-11-02 PROCEDURE — 82330 ASSAY OF CALCIUM: CPT | Performed by: NURSE PRACTITIONER

## 2021-11-02 PROCEDURE — 80202 ASSAY OF VANCOMYCIN: CPT | Performed by: PHYSICIAN ASSISTANT

## 2021-11-02 PROCEDURE — 94003 VENT MGMT INPAT SUBQ DAY: CPT

## 2021-11-02 PROCEDURE — 94760 N-INVAS EAR/PLS OXIMETRY 1: CPT

## 2021-11-02 PROCEDURE — 80076 HEPATIC FUNCTION PANEL: CPT | Performed by: PHYSICIAN ASSISTANT

## 2021-11-02 PROCEDURE — 82805 BLOOD GASES W/O2 SATURATION: CPT | Performed by: NURSE PRACTITIONER

## 2021-11-02 PROCEDURE — G1004 CDSM NDSC: HCPCS

## 2021-11-02 PROCEDURE — 85027 COMPLETE CBC AUTOMATED: CPT | Performed by: PHYSICIAN ASSISTANT

## 2021-11-02 PROCEDURE — 74176 CT ABD & PELVIS W/O CONTRAST: CPT

## 2021-11-02 PROCEDURE — 80048 BASIC METABOLIC PNL TOTAL CA: CPT | Performed by: PHYSICIAN ASSISTANT

## 2021-11-02 PROCEDURE — 85007 BL SMEAR W/DIFF WBC COUNT: CPT | Performed by: NURSE PRACTITIONER

## 2021-11-02 PROCEDURE — 86920 COMPATIBILITY TEST SPIN: CPT

## 2021-11-02 PROCEDURE — 86900 BLOOD TYPING SEROLOGIC ABO: CPT | Performed by: NURSE PRACTITIONER

## 2021-11-02 PROCEDURE — 84443 ASSAY THYROID STIM HORMONE: CPT | Performed by: NURSE PRACTITIONER

## 2021-11-02 PROCEDURE — 80048 BASIC METABOLIC PNL TOTAL CA: CPT | Performed by: NURSE PRACTITIONER

## 2021-11-02 PROCEDURE — 85018 HEMOGLOBIN: CPT | Performed by: NURSE PRACTITIONER

## 2021-11-02 PROCEDURE — 94640 AIRWAY INHALATION TREATMENT: CPT

## 2021-11-02 PROCEDURE — 84100 ASSAY OF PHOSPHORUS: CPT | Performed by: PHYSICIAN ASSISTANT

## 2021-11-02 PROCEDURE — 85027 COMPLETE CBC AUTOMATED: CPT | Performed by: NURSE PRACTITIONER

## 2021-11-02 PROCEDURE — 82330 ASSAY OF CALCIUM: CPT | Performed by: PHYSICIAN ASSISTANT

## 2021-11-02 PROCEDURE — 71250 CT THORAX DX C-: CPT

## 2021-11-02 PROCEDURE — 82805 BLOOD GASES W/O2 SATURATION: CPT | Performed by: PHYSICIAN ASSISTANT

## 2021-11-02 PROCEDURE — 86850 RBC ANTIBODY SCREEN: CPT | Performed by: NURSE PRACTITIONER

## 2021-11-02 PROCEDURE — 84145 PROCALCITONIN (PCT): CPT | Performed by: PHYSICIAN ASSISTANT

## 2021-11-02 RX ORDER — VANCOMYCIN HYDROCHLORIDE 1 G/200ML
12.5 INJECTION, SOLUTION INTRAVENOUS DAILY PRN
Status: DISCONTINUED | OUTPATIENT
Start: 2021-11-03 | End: 2021-11-04

## 2021-11-02 RX ORDER — CALCIUM GLUCONATE 20 MG/ML
2 INJECTION, SOLUTION INTRAVENOUS ONCE
Status: COMPLETED | OUTPATIENT
Start: 2021-11-02 | End: 2021-11-02

## 2021-11-02 RX ORDER — MAGNESIUM SULFATE HEPTAHYDRATE 40 MG/ML
2 INJECTION, SOLUTION INTRAVENOUS ONCE
Status: COMPLETED | OUTPATIENT
Start: 2021-11-02 | End: 2021-11-02

## 2021-11-02 RX ORDER — POTASSIUM CHLORIDE 20MEQ/15ML
40 LIQUID (ML) ORAL ONCE
Status: COMPLETED | OUTPATIENT
Start: 2021-11-02 | End: 2021-11-02

## 2021-11-02 RX ORDER — POTASSIUM CHLORIDE 29.8 MG/ML
40 INJECTION INTRAVENOUS ONCE
Status: COMPLETED | OUTPATIENT
Start: 2021-11-02 | End: 2021-11-02

## 2021-11-02 RX ORDER — PHENYLEPHRINE HYDROCHLORIDE 10 MG/ML
INJECTION INTRAVENOUS
Status: DISPENSED
Start: 2021-11-02 | End: 2021-11-03

## 2021-11-02 RX ORDER — POTASSIUM CHLORIDE 29.8 MG/ML
40 INJECTION INTRAVENOUS ONCE
Status: COMPLETED | OUTPATIENT
Start: 2021-11-02 | End: 2021-11-03

## 2021-11-02 RX ORDER — METOCLOPRAMIDE HYDROCHLORIDE 5 MG/ML
5 INJECTION INTRAMUSCULAR; INTRAVENOUS ONCE
Status: COMPLETED | OUTPATIENT
Start: 2021-11-02 | End: 2021-11-02

## 2021-11-02 RX ADMIN — Medication 40 MG/HR: at 02:23

## 2021-11-02 RX ADMIN — IPRATROPIUM BROMIDE 0.5 MG: 0.5 SOLUTION RESPIRATORY (INHALATION) at 14:47

## 2021-11-02 RX ADMIN — Medication 40 MEQ: at 23:43

## 2021-11-02 RX ADMIN — POTASSIUM CHLORIDE 40 MEQ: 29.8 INJECTION, SOLUTION INTRAVENOUS at 23:44

## 2021-11-02 RX ADMIN — ASPIRIN 81 MG CHEWABLE TABLET 81 MG: 81 TABLET CHEWABLE at 10:44

## 2021-11-02 RX ADMIN — CALCIUM GLUCONATE 2 G: 20 INJECTION, SOLUTION INTRAVENOUS at 06:02

## 2021-11-02 RX ADMIN — DOCUSATE SODIUM AND SENNOSIDES 2 TABLET: 8.6; 5 TABLET ORAL at 10:44

## 2021-11-02 RX ADMIN — POTASSIUM CHLORIDE 40 MEQ: 29.8 INJECTION, SOLUTION INTRAVENOUS at 02:26

## 2021-11-02 RX ADMIN — ACETAMINOPHEN 975 MG: 650 SUSPENSION ORAL at 00:29

## 2021-11-02 RX ADMIN — Medication 40 MG/HR: at 19:48

## 2021-11-02 RX ADMIN — Medication 40 MEQ: at 03:04

## 2021-11-02 RX ADMIN — SODIUM CHLORIDE 12 UNITS/HR: 9 INJECTION, SOLUTION INTRAVENOUS at 11:18

## 2021-11-02 RX ADMIN — TICAGRELOR 90 MG: 90 TABLET ORAL at 10:45

## 2021-11-02 RX ADMIN — MAGNESIUM SULFATE HEPTAHYDRATE 2 G: 40 INJECTION, SOLUTION INTRAVENOUS at 21:38

## 2021-11-02 RX ADMIN — LEVALBUTEROL HYDROCHLORIDE 1.25 MG: 1.25 SOLUTION, CONCENTRATE RESPIRATORY (INHALATION) at 20:00

## 2021-11-02 RX ADMIN — IPRATROPIUM BROMIDE 0.5 MG: 0.5 SOLUTION RESPIRATORY (INHALATION) at 07:38

## 2021-11-02 RX ADMIN — METOCLOPRAMIDE 5 MG: 5 INJECTION, SOLUTION INTRAMUSCULAR; INTRAVENOUS at 17:40

## 2021-11-02 RX ADMIN — ATORVASTATIN CALCIUM 40 MG: 40 TABLET, FILM COATED ORAL at 16:33

## 2021-11-02 RX ADMIN — CALCIUM GLUCONATE 2 G: 20 INJECTION, SOLUTION INTRAVENOUS at 14:22

## 2021-11-02 RX ADMIN — SODIUM CHLORIDE 3 G: 9 INJECTION, SOLUTION INTRAVENOUS at 03:04

## 2021-11-02 RX ADMIN — Medication 20 MG: at 06:02

## 2021-11-02 RX ADMIN — DICYCLOMINE HYDROCHLORIDE 20 MG: 20 TABLET ORAL at 23:44

## 2021-11-02 RX ADMIN — ONDANSETRON 4 MG: 2 INJECTION INTRAMUSCULAR; INTRAVENOUS at 13:20

## 2021-11-02 RX ADMIN — LEVALBUTEROL HYDROCHLORIDE 1.25 MG: 1.25 SOLUTION, CONCENTRATE RESPIRATORY (INHALATION) at 07:38

## 2021-11-02 RX ADMIN — LEVALBUTEROL HYDROCHLORIDE 1.25 MG: 1.25 SOLUTION, CONCENTRATE RESPIRATORY (INHALATION) at 14:46

## 2021-11-02 RX ADMIN — ACETAMINOPHEN 975 MG: 650 SUSPENSION ORAL at 06:02

## 2021-11-02 RX ADMIN — AMIODARONE HYDROCHLORIDE 1 MG/MIN: 50 INJECTION, SOLUTION INTRAVENOUS at 07:58

## 2021-11-02 RX ADMIN — DICYCLOMINE HYDROCHLORIDE 20 MG: 20 TABLET ORAL at 12:08

## 2021-11-02 RX ADMIN — CALCIUM GLUCONATE 2 G: 20 INJECTION, SOLUTION INTRAVENOUS at 20:26

## 2021-11-02 RX ADMIN — DICYCLOMINE HYDROCHLORIDE 20 MG: 20 TABLET ORAL at 16:32

## 2021-11-02 RX ADMIN — FENTANYL CITRATE 50 MCG: 50 INJECTION INTRAMUSCULAR; INTRAVENOUS at 18:16

## 2021-11-02 RX ADMIN — Medication 40 MG/HR: at 11:15

## 2021-11-02 RX ADMIN — VANCOMYCIN HYDROCHLORIDE 1250 MG: 1 INJECTION, POWDER, LYOPHILIZED, FOR SOLUTION INTRAVENOUS at 08:08

## 2021-11-02 RX ADMIN — IPRATROPIUM BROMIDE 0.5 MG: 0.5 SOLUTION RESPIRATORY (INHALATION) at 20:00

## 2021-11-02 RX ADMIN — HEPARIN SODIUM 13.1 UNITS/KG/HR: 10000 INJECTION, SOLUTION INTRAVENOUS at 03:55

## 2021-11-02 RX ADMIN — MILRINONE LACTATE IN DEXTROSE 0.13 MCG/KG/MIN: 200 INJECTION, SOLUTION INTRAVENOUS at 06:03

## 2021-11-02 RX ADMIN — SODIUM CHLORIDE 3 G: 9 INJECTION, SOLUTION INTRAVENOUS at 10:44

## 2021-11-02 RX ADMIN — DICYCLOMINE HYDROCHLORIDE 20 MG: 20 TABLET ORAL at 06:03

## 2021-11-02 RX ADMIN — ACETAMINOPHEN 975 MG: 650 SUSPENSION ORAL at 12:08

## 2021-11-02 RX ADMIN — FENTANYL CITRATE 50 MCG/HR: 50 INJECTION INTRAMUSCULAR; INTRAVENOUS at 11:20

## 2021-11-02 RX ADMIN — TICAGRELOR 90 MG: 90 TABLET ORAL at 20:20

## 2021-11-02 RX ADMIN — FENTANYL CITRATE 100 MCG/HR: 50 INJECTION INTRAMUSCULAR; INTRAVENOUS at 23:54

## 2021-11-02 RX ADMIN — CHLORHEXIDINE GLUCONATE 15 ML: 1.2 SOLUTION ORAL at 10:44

## 2021-11-02 RX ADMIN — CHLORHEXIDINE GLUCONATE 15 ML: 1.2 SOLUTION ORAL at 20:20

## 2021-11-02 RX ADMIN — FENTANYL CITRATE 100 MCG/HR: 50 INJECTION INTRAMUSCULAR; INTRAVENOUS at 23:24

## 2021-11-02 RX ADMIN — ACETAMINOPHEN 975 MG: 650 SUSPENSION ORAL at 17:40

## 2021-11-02 RX ADMIN — POTASSIUM CHLORIDE 40 MEQ: 29.8 INJECTION, SOLUTION INTRAVENOUS at 14:22

## 2021-11-03 ENCOUNTER — APPOINTMENT (INPATIENT)
Dept: RADIOLOGY | Facility: HOSPITAL | Age: 56
DRG: 003 | End: 2021-11-03
Payer: MEDICARE

## 2021-11-03 PROBLEM — I46.9 CARDIAC ARREST (HCC): Status: ACTIVE | Noted: 2021-11-03

## 2021-11-03 PROBLEM — J15.212 MRSA PNEUMONIA (HCC): Status: ACTIVE | Noted: 2021-11-03

## 2021-11-03 PROBLEM — J96.01 ACUTE HYPOXEMIC RESPIRATORY FAILURE (HCC): Status: ACTIVE | Noted: 2021-11-03

## 2021-11-03 LAB
ABO GROUP BLD BPU: NORMAL
ANION GAP SERPL CALCULATED.3IONS-SCNC: 6 MMOL/L (ref 4–13)
ANION GAP SERPL CALCULATED.3IONS-SCNC: 8 MMOL/L (ref 4–13)
APTT PPP: 31 SECONDS (ref 23–37)
APTT PPP: 33 SECONDS (ref 23–37)
APTT PPP: 45 SECONDS (ref 23–37)
BACTERIA BLD CULT: NORMAL
BACTERIA BLD CULT: NORMAL
BASE EX.OXY STD BLDV CALC-SCNC: 47.2 % (ref 60–80)
BASE EXCESS BLDV CALC-SCNC: 4.6 MMOL/L
BPU ID: NORMAL
BUN SERPL-MCNC: 67 MG/DL (ref 5–25)
BUN SERPL-MCNC: 78 MG/DL (ref 5–25)
CA-I BLD-SCNC: 1.15 MMOL/L (ref 1.12–1.32)
CALCIUM SERPL-MCNC: 9 MG/DL (ref 8.3–10.1)
CALCIUM SERPL-MCNC: 9.7 MG/DL (ref 8.3–10.1)
CHLORIDE SERPL-SCNC: 101 MMOL/L (ref 100–108)
CHLORIDE SERPL-SCNC: 99 MMOL/L (ref 100–108)
CO2 SERPL-SCNC: 27 MMOL/L (ref 21–32)
CO2 SERPL-SCNC: 30 MMOL/L (ref 21–32)
CREAT SERPL-MCNC: 1.79 MG/DL (ref 0.6–1.3)
CREAT SERPL-MCNC: 1.82 MG/DL (ref 0.6–1.3)
CROSSMATCH: NORMAL
ERYTHROCYTE [DISTWIDTH] IN BLOOD BY AUTOMATED COUNT: 17.2 % (ref 11.6–15.1)
GFR SERPL CREATININE-BSD FRML MDRD: 31 ML/MIN/1.73SQ M
GFR SERPL CREATININE-BSD FRML MDRD: 31 ML/MIN/1.73SQ M
GLUCOSE SERPL-MCNC: 100 MG/DL (ref 65–140)
GLUCOSE SERPL-MCNC: 104 MG/DL (ref 65–140)
GLUCOSE SERPL-MCNC: 117 MG/DL (ref 65–140)
GLUCOSE SERPL-MCNC: 124 MG/DL (ref 65–140)
GLUCOSE SERPL-MCNC: 125 MG/DL (ref 65–140)
GLUCOSE SERPL-MCNC: 125 MG/DL (ref 65–140)
GLUCOSE SERPL-MCNC: 137 MG/DL (ref 65–140)
GLUCOSE SERPL-MCNC: 151 MG/DL (ref 65–140)
GLUCOSE SERPL-MCNC: 155 MG/DL (ref 65–140)
GLUCOSE SERPL-MCNC: 179 MG/DL (ref 65–140)
GLUCOSE SERPL-MCNC: 184 MG/DL (ref 65–140)
GLUCOSE SERPL-MCNC: 195 MG/DL (ref 65–140)
GLUCOSE SERPL-MCNC: 205 MG/DL (ref 65–140)
HCO3 BLDV-SCNC: 28.9 MMOL/L (ref 24–30)
HCT VFR BLD AUTO: 25.2 % (ref 34.8–46.1)
HCT VFR BLD AUTO: 25.5 % (ref 34.8–46.1)
HGB BLD-MCNC: 8 G/DL (ref 11.5–15.4)
HGB BLD-MCNC: 8 G/DL (ref 11.5–15.4)
INR PPP: 1.3 (ref 0.84–1.19)
MAGNESIUM SERPL-MCNC: 2.7 MG/DL (ref 1.6–2.6)
MCH RBC QN AUTO: 26.7 PG (ref 26.8–34.3)
MCHC RBC AUTO-ENTMCNC: 31.4 G/DL (ref 31.4–37.4)
MCV RBC AUTO: 85 FL (ref 82–98)
NRBC BLD AUTO-RTO: 1 /100 WBCS
O2 CT BLDV-SCNC: 6 ML/DL
PCO2 BLDV: 42.3 MM HG (ref 42–50)
PH BLDV: 7.45 [PH] (ref 7.3–7.4)
PHOSPHATE SERPL-MCNC: 5 MG/DL (ref 2.7–4.5)
PLATELET # BLD AUTO: 553 THOUSANDS/UL (ref 149–390)
PMV BLD AUTO: 10.2 FL (ref 8.9–12.7)
PO2 BLDV: 28.4 MM HG (ref 35–45)
POTASSIUM SERPL-SCNC: 3.5 MMOL/L (ref 3.5–5.3)
POTASSIUM SERPL-SCNC: 3.9 MMOL/L (ref 3.5–5.3)
PROCALCITONIN SERPL-MCNC: 8.61 NG/ML
PROTHROMBIN TIME: 15.7 SECONDS (ref 11.6–14.5)
PS CM H2O: 12
PS VENT FIO2: 50
PS VENT PEEP: 8
RBC # BLD AUTO: 3 MILLION/UL (ref 3.81–5.12)
SODIUM SERPL-SCNC: 135 MMOL/L (ref 136–145)
SODIUM SERPL-SCNC: 136 MMOL/L (ref 136–145)
TSH SERPL DL<=0.05 MIU/L-ACNC: 0.76 UIU/ML (ref 0.36–3.74)
UNIT DISPENSE STATUS: NORMAL
UNIT PRODUCT CODE: NORMAL
UNIT PRODUCT VOLUME: 350 ML
UNIT RH: NORMAL
VANCOMYCIN SERPL-MCNC: 25.2 UG/ML
VENT - PS: ABNORMAL
WBC # BLD AUTO: 34.39 THOUSAND/UL (ref 4.31–10.16)

## 2021-11-03 PROCEDURE — 85730 THROMBOPLASTIN TIME PARTIAL: CPT | Performed by: INTERNAL MEDICINE

## 2021-11-03 PROCEDURE — 99292 CRITICAL CARE ADDL 30 MIN: CPT | Performed by: STUDENT IN AN ORGANIZED HEALTH CARE EDUCATION/TRAINING PROGRAM

## 2021-11-03 PROCEDURE — 80202 ASSAY OF VANCOMYCIN: CPT | Performed by: INTERNAL MEDICINE

## 2021-11-03 PROCEDURE — 84100 ASSAY OF PHOSPHORUS: CPT | Performed by: NURSE PRACTITIONER

## 2021-11-03 PROCEDURE — 94003 VENT MGMT INPAT SUBQ DAY: CPT

## 2021-11-03 PROCEDURE — 85610 PROTHROMBIN TIME: CPT | Performed by: PHYSICIAN ASSISTANT

## 2021-11-03 PROCEDURE — 84145 PROCALCITONIN (PCT): CPT | Performed by: NURSE PRACTITIONER

## 2021-11-03 PROCEDURE — G1004 CDSM NDSC: HCPCS

## 2021-11-03 PROCEDURE — 82330 ASSAY OF CALCIUM: CPT | Performed by: NURSE PRACTITIONER

## 2021-11-03 PROCEDURE — 85014 HEMATOCRIT: CPT | Performed by: STUDENT IN AN ORGANIZED HEALTH CARE EDUCATION/TRAINING PROGRAM

## 2021-11-03 PROCEDURE — 99232 SBSQ HOSP IP/OBS MODERATE 35: CPT | Performed by: INTERNAL MEDICINE

## 2021-11-03 PROCEDURE — 70450 CT HEAD/BRAIN W/O DYE: CPT

## 2021-11-03 PROCEDURE — 80048 BASIC METABOLIC PNL TOTAL CA: CPT | Performed by: STUDENT IN AN ORGANIZED HEALTH CARE EDUCATION/TRAINING PROGRAM

## 2021-11-03 PROCEDURE — 82948 REAGENT STRIP/BLOOD GLUCOSE: CPT

## 2021-11-03 PROCEDURE — 82805 BLOOD GASES W/O2 SATURATION: CPT | Performed by: NURSE PRACTITIONER

## 2021-11-03 PROCEDURE — 80048 BASIC METABOLIC PNL TOTAL CA: CPT | Performed by: NURSE PRACTITIONER

## 2021-11-03 PROCEDURE — 85730 THROMBOPLASTIN TIME PARTIAL: CPT | Performed by: PHYSICIAN ASSISTANT

## 2021-11-03 PROCEDURE — 99291 CRITICAL CARE FIRST HOUR: CPT | Performed by: NURSE PRACTITIONER

## 2021-11-03 PROCEDURE — 84443 ASSAY THYROID STIM HORMONE: CPT | Performed by: STUDENT IN AN ORGANIZED HEALTH CARE EDUCATION/TRAINING PROGRAM

## 2021-11-03 PROCEDURE — 99233 SBSQ HOSP IP/OBS HIGH 50: CPT | Performed by: INTERNAL MEDICINE

## 2021-11-03 PROCEDURE — 85027 COMPLETE CBC AUTOMATED: CPT | Performed by: NURSE PRACTITIONER

## 2021-11-03 PROCEDURE — 85018 HEMOGLOBIN: CPT | Performed by: STUDENT IN AN ORGANIZED HEALTH CARE EDUCATION/TRAINING PROGRAM

## 2021-11-03 PROCEDURE — 94760 N-INVAS EAR/PLS OXIMETRY 1: CPT

## 2021-11-03 PROCEDURE — 94640 AIRWAY INHALATION TREATMENT: CPT

## 2021-11-03 PROCEDURE — 85730 THROMBOPLASTIN TIME PARTIAL: CPT | Performed by: NURSE PRACTITIONER

## 2021-11-03 PROCEDURE — 83735 ASSAY OF MAGNESIUM: CPT | Performed by: NURSE PRACTITIONER

## 2021-11-03 RX ORDER — LIDOCAINE 50 MG/G
1 PATCH TOPICAL DAILY
Status: DISCONTINUED | OUTPATIENT
Start: 2021-11-03 | End: 2021-11-13

## 2021-11-03 RX ORDER — HEPARIN SODIUM 10000 [USP'U]/100ML
3-20 INJECTION, SOLUTION INTRAVENOUS
Status: DISCONTINUED | OUTPATIENT
Start: 2021-11-03 | End: 2021-11-10

## 2021-11-03 RX ORDER — LANOLIN ALCOHOL/MO/W.PET/CERES
6 CREAM (GRAM) TOPICAL
Status: DISCONTINUED | OUTPATIENT
Start: 2021-11-03 | End: 2021-11-23 | Stop reason: HOSPADM

## 2021-11-03 RX ORDER — ACETAMINOPHEN 160 MG/5ML
975 SUSPENSION, ORAL (FINAL DOSE FORM) ORAL EVERY 8 HOURS
Status: DISCONTINUED | OUTPATIENT
Start: 2021-11-03 | End: 2021-11-05

## 2021-11-03 RX ORDER — POTASSIUM CHLORIDE 20MEQ/15ML
40 LIQUID (ML) ORAL ONCE
Status: COMPLETED | OUTPATIENT
Start: 2021-11-03 | End: 2021-11-03

## 2021-11-03 RX ORDER — PREGABALIN 75 MG/1
75 CAPSULE ORAL DAILY
Status: DISCONTINUED | OUTPATIENT
Start: 2021-11-03 | End: 2021-11-23 | Stop reason: HOSPADM

## 2021-11-03 RX ORDER — DEXMEDETOMIDINE 100 UG/ML
.1-.7 INJECTION, SOLUTION, CONCENTRATE INTRAVENOUS
Status: DISCONTINUED | OUTPATIENT
Start: 2021-11-03 | End: 2021-11-07

## 2021-11-03 RX ADMIN — TICAGRELOR 90 MG: 90 TABLET ORAL at 08:10

## 2021-11-03 RX ADMIN — Medication 20 MG: at 07:49

## 2021-11-03 RX ADMIN — IPRATROPIUM BROMIDE 0.5 MG: 0.5 SOLUTION RESPIRATORY (INHALATION) at 19:53

## 2021-11-03 RX ADMIN — ACETAMINOPHEN 975 MG: 650 SUSPENSION ORAL at 16:23

## 2021-11-03 RX ADMIN — CHLORHEXIDINE GLUCONATE 15 ML: 1.2 SOLUTION ORAL at 20:30

## 2021-11-03 RX ADMIN — IPRATROPIUM BROMIDE 0.5 MG: 0.5 SOLUTION RESPIRATORY (INHALATION) at 08:31

## 2021-11-03 RX ADMIN — ATORVASTATIN CALCIUM 40 MG: 40 TABLET, FILM COATED ORAL at 16:24

## 2021-11-03 RX ADMIN — ACETAMINOPHEN 975 MG: 650 SUSPENSION ORAL at 08:09

## 2021-11-03 RX ADMIN — POTASSIUM CHLORIDE 40 MEQ: 20 SOLUTION ORAL at 20:31

## 2021-11-03 RX ADMIN — DOCUSATE SODIUM AND SENNOSIDES 2 TABLET: 8.6; 5 TABLET ORAL at 08:09

## 2021-11-03 RX ADMIN — SODIUM CHLORIDE 1.5 UNITS/HR: 9 INJECTION, SOLUTION INTRAVENOUS at 16:20

## 2021-11-03 RX ADMIN — LEVALBUTEROL HYDROCHLORIDE 1.25 MG: 1.25 SOLUTION, CONCENTRATE RESPIRATORY (INHALATION) at 13:20

## 2021-11-03 RX ADMIN — LEVALBUTEROL HYDROCHLORIDE 1.25 MG: 1.25 SOLUTION, CONCENTRATE RESPIRATORY (INHALATION) at 08:31

## 2021-11-03 RX ADMIN — ASPIRIN 81 MG CHEWABLE TABLET 81 MG: 81 TABLET CHEWABLE at 08:09

## 2021-11-03 RX ADMIN — DICYCLOMINE HYDROCHLORIDE 20 MG: 20 TABLET ORAL at 18:27

## 2021-11-03 RX ADMIN — CHLORHEXIDINE GLUCONATE 15 ML: 1.2 SOLUTION ORAL at 08:09

## 2021-11-03 RX ADMIN — TICAGRELOR 90 MG: 90 TABLET ORAL at 20:30

## 2021-11-03 RX ADMIN — IPRATROPIUM BROMIDE 0.5 MG: 0.5 SOLUTION RESPIRATORY (INHALATION) at 13:20

## 2021-11-03 RX ADMIN — DICYCLOMINE HYDROCHLORIDE 20 MG: 20 TABLET ORAL at 07:50

## 2021-11-03 RX ADMIN — PREGABALIN 75 MG: 75 CAPSULE ORAL at 08:22

## 2021-11-03 RX ADMIN — LEVALBUTEROL HYDROCHLORIDE 1.25 MG: 1.25 SOLUTION, CONCENTRATE RESPIRATORY (INHALATION) at 19:53

## 2021-11-03 RX ADMIN — AMIODARONE HYDROCHLORIDE 1 MG/MIN: 50 INJECTION, SOLUTION INTRAVENOUS at 05:00

## 2021-11-03 RX ADMIN — LIDOCAINE 5% 1 PATCH: 700 PATCH TOPICAL at 08:10

## 2021-11-03 RX ADMIN — HEPARIN SODIUM 11.1 UNITS/KG/HR: 10000 INJECTION, SOLUTION INTRAVENOUS at 11:28

## 2021-11-04 ENCOUNTER — APPOINTMENT (INPATIENT)
Dept: RADIOLOGY | Facility: HOSPITAL | Age: 56
DRG: 003 | End: 2021-11-04
Payer: MEDICARE

## 2021-11-04 PROBLEM — R93.0 ABNORMAL CT OF THE HEAD: Status: ACTIVE | Noted: 2021-11-04

## 2021-11-04 LAB
ALBUMIN SERPL BCP-MCNC: 1.2 G/DL (ref 3.5–5)
ALP SERPL-CCNC: 136 U/L (ref 46–116)
ALT SERPL W P-5'-P-CCNC: 39 U/L (ref 12–78)
AMMONIA PLAS-SCNC: 29 UMOL/L (ref 11–35)
ANION GAP SERPL CALCULATED.3IONS-SCNC: 5 MMOL/L (ref 4–13)
ANION GAP SERPL CALCULATED.3IONS-SCNC: 5 MMOL/L (ref 4–13)
APTT PPP: 53 SECONDS (ref 23–37)
APTT PPP: 56 SECONDS (ref 23–37)
APTT PPP: 76 SECONDS (ref 23–37)
AST SERPL W P-5'-P-CCNC: 45 U/L (ref 5–45)
BASE EX.OXY STD BLDV CALC-SCNC: 41.3 % (ref 60–80)
BASE EXCESS BLDA CALC-SCNC: 5 MMOL/L
BASE EXCESS BLDV CALC-SCNC: 5.2 MMOL/L
BILIRUB DIRECT SERPL-MCNC: 0.25 MG/DL (ref 0–0.2)
BILIRUB SERPL-MCNC: 0.44 MG/DL (ref 0.2–1)
BUN SERPL-MCNC: 79 MG/DL (ref 5–25)
BUN SERPL-MCNC: 82 MG/DL (ref 5–25)
CALCIUM SERPL-MCNC: 8.5 MG/DL (ref 8.3–10.1)
CALCIUM SERPL-MCNC: 8.8 MG/DL (ref 8.3–10.1)
CHLORIDE SERPL-SCNC: 103 MMOL/L (ref 100–108)
CHLORIDE SERPL-SCNC: 104 MMOL/L (ref 100–108)
CO2 SERPL-SCNC: 28 MMOL/L (ref 21–32)
CO2 SERPL-SCNC: 29 MMOL/L (ref 21–32)
CREAT SERPL-MCNC: 1.59 MG/DL (ref 0.6–1.3)
CREAT SERPL-MCNC: 1.79 MG/DL (ref 0.6–1.3)
CRP SERPL QL: 163 MG/L
ERYTHROCYTE [DISTWIDTH] IN BLOOD BY AUTOMATED COUNT: 17 % (ref 11.6–15.1)
ERYTHROCYTE [SEDIMENTATION RATE] IN BLOOD: 97 MM/HOUR (ref 0–29)
GFR SERPL CREATININE-BSD FRML MDRD: 31 ML/MIN/1.73SQ M
GFR SERPL CREATININE-BSD FRML MDRD: 36 ML/MIN/1.73SQ M
GLUCOSE SERPL-MCNC: 134 MG/DL (ref 65–140)
GLUCOSE SERPL-MCNC: 144 MG/DL (ref 65–140)
GLUCOSE SERPL-MCNC: 146 MG/DL (ref 65–140)
GLUCOSE SERPL-MCNC: 147 MG/DL (ref 65–140)
GLUCOSE SERPL-MCNC: 150 MG/DL (ref 65–140)
GLUCOSE SERPL-MCNC: 151 MG/DL (ref 65–140)
GLUCOSE SERPL-MCNC: 155 MG/DL (ref 65–140)
GLUCOSE SERPL-MCNC: 158 MG/DL (ref 65–140)
GLUCOSE SERPL-MCNC: 169 MG/DL (ref 65–140)
GLUCOSE SERPL-MCNC: 176 MG/DL (ref 65–140)
GLUCOSE SERPL-MCNC: 182 MG/DL (ref 65–140)
GLUCOSE SERPL-MCNC: 187 MG/DL (ref 65–140)
GLUCOSE SERPL-MCNC: 187 MG/DL (ref 65–140)
GLUCOSE SERPL-MCNC: 227 MG/DL (ref 65–140)
HCO3 BLDA-SCNC: 28.8 MMOL/L (ref 22–28)
HCO3 BLDV-SCNC: 29.9 MMOL/L (ref 24–30)
HCT VFR BLD AUTO: 24.4 % (ref 34.8–46.1)
HGB BLD-MCNC: 7.5 G/DL (ref 11.5–15.4)
HIV 1+2 AB+HIV1 P24 AG SERPL QL IA: NORMAL
HIV1 P24 AG SER QL: NORMAL
HOROWITZ INDEX BLDA+IHG-RTO: 40 MM[HG]
HOROWITZ INDEX BLDA+IHG-RTO: 40 MM[HG]
MAGNESIUM SERPL-MCNC: 2.6 MG/DL (ref 1.6–2.6)
MCH RBC QN AUTO: 26.2 PG (ref 26.8–34.3)
MCHC RBC AUTO-ENTMCNC: 30.7 G/DL (ref 31.4–37.4)
MCV RBC AUTO: 85 FL (ref 82–98)
O2 CT BLDA-SCNC: 10.6 ML/DL (ref 16–23)
O2 CT BLDV-SCNC: 4.8 ML/DL
OXYHGB MFR BLDA: 92.2 % (ref 94–97)
PCO2 BLDA: 38.9 MM HG (ref 36–44)
PCO2 BLDV: 45.1 MM HG (ref 42–50)
PEEP RESPIRATORY: 8 CM[H2O]
PEEP RESPIRATORY: 8 CM[H2O]
PH BLDA: 7.49 [PH] (ref 7.35–7.45)
PH BLDV: 7.44 [PH] (ref 7.3–7.4)
PHOSPHATE SERPL-MCNC: 4.6 MG/DL (ref 2.7–4.5)
PLATELET # BLD AUTO: 571 THOUSANDS/UL (ref 149–390)
PMV BLD AUTO: 9.8 FL (ref 8.9–12.7)
PO2 BLDA: 68.6 MM HG (ref 75–129)
PO2 BLDV: 25.4 MM HG (ref 35–45)
POTASSIUM SERPL-SCNC: 3.7 MMOL/L (ref 3.5–5.3)
POTASSIUM SERPL-SCNC: 3.8 MMOL/L (ref 3.5–5.3)
PRESSURE CONTROL: 12
PRESSURE CONTROL: 12
PROCALCITONIN SERPL-MCNC: 5.29 NG/ML
PROT SERPL-MCNC: 6.6 G/DL (ref 6.4–8.2)
RBC # BLD AUTO: 2.86 MILLION/UL (ref 3.81–5.12)
SODIUM SERPL-SCNC: 137 MMOL/L (ref 136–145)
SODIUM SERPL-SCNC: 137 MMOL/L (ref 136–145)
SPECIMEN SOURCE: ABNORMAL
VANCOMYCIN SERPL-MCNC: 14.7 UG/ML
VENT- AC: AC
VENT- AC: AC
WBC # BLD AUTO: 30.35 THOUSAND/UL (ref 4.31–10.16)

## 2021-11-04 PROCEDURE — 80076 HEPATIC FUNCTION PANEL: CPT | Performed by: INTERNAL MEDICINE

## 2021-11-04 PROCEDURE — 94640 AIRWAY INHALATION TREATMENT: CPT

## 2021-11-04 PROCEDURE — 80202 ASSAY OF VANCOMYCIN: CPT | Performed by: PHYSICIAN ASSISTANT

## 2021-11-04 PROCEDURE — 82140 ASSAY OF AMMONIA: CPT | Performed by: INTERNAL MEDICINE

## 2021-11-04 PROCEDURE — 99232 SBSQ HOSP IP/OBS MODERATE 35: CPT | Performed by: INTERNAL MEDICINE

## 2021-11-04 PROCEDURE — 85652 RBC SED RATE AUTOMATED: CPT | Performed by: INTERNAL MEDICINE

## 2021-11-04 PROCEDURE — 99292 CRITICAL CARE ADDL 30 MIN: CPT | Performed by: STUDENT IN AN ORGANIZED HEALTH CARE EDUCATION/TRAINING PROGRAM

## 2021-11-04 PROCEDURE — 85027 COMPLETE CBC AUTOMATED: CPT | Performed by: INTERNAL MEDICINE

## 2021-11-04 PROCEDURE — 82948 REAGENT STRIP/BLOOD GLUCOSE: CPT

## 2021-11-04 PROCEDURE — 94003 VENT MGMT INPAT SUBQ DAY: CPT

## 2021-11-04 PROCEDURE — 86140 C-REACTIVE PROTEIN: CPT | Performed by: STUDENT IN AN ORGANIZED HEALTH CARE EDUCATION/TRAINING PROGRAM

## 2021-11-04 PROCEDURE — 83735 ASSAY OF MAGNESIUM: CPT | Performed by: INTERNAL MEDICINE

## 2021-11-04 PROCEDURE — 87806 HIV AG W/HIV1&2 ANTB W/OPTIC: CPT | Performed by: STUDENT IN AN ORGANIZED HEALTH CARE EDUCATION/TRAINING PROGRAM

## 2021-11-04 PROCEDURE — 82805 BLOOD GASES W/O2 SATURATION: CPT | Performed by: NURSE PRACTITIONER

## 2021-11-04 PROCEDURE — 80048 BASIC METABOLIC PNL TOTAL CA: CPT | Performed by: PHYSICIAN ASSISTANT

## 2021-11-04 PROCEDURE — 85730 THROMBOPLASTIN TIME PARTIAL: CPT | Performed by: INTERNAL MEDICINE

## 2021-11-04 PROCEDURE — 80048 BASIC METABOLIC PNL TOTAL CA: CPT | Performed by: INTERNAL MEDICINE

## 2021-11-04 PROCEDURE — 71250 CT THORAX DX C-: CPT

## 2021-11-04 PROCEDURE — 86038 ANTINUCLEAR ANTIBODIES: CPT | Performed by: STUDENT IN AN ORGANIZED HEALTH CARE EDUCATION/TRAINING PROGRAM

## 2021-11-04 PROCEDURE — 84100 ASSAY OF PHOSPHORUS: CPT | Performed by: INTERNAL MEDICINE

## 2021-11-04 PROCEDURE — 86255 FLUORESCENT ANTIBODY SCREEN: CPT | Performed by: STUDENT IN AN ORGANIZED HEALTH CARE EDUCATION/TRAINING PROGRAM

## 2021-11-04 PROCEDURE — 94760 N-INVAS EAR/PLS OXIMETRY 1: CPT

## 2021-11-04 PROCEDURE — 99291 CRITICAL CARE FIRST HOUR: CPT | Performed by: NURSE PRACTITIONER

## 2021-11-04 PROCEDURE — 99223 1ST HOSP IP/OBS HIGH 75: CPT | Performed by: NURSE PRACTITIONER

## 2021-11-04 PROCEDURE — 70450 CT HEAD/BRAIN W/O DYE: CPT

## 2021-11-04 PROCEDURE — 99223 1ST HOSP IP/OBS HIGH 75: CPT | Performed by: PHYSICIAN ASSISTANT

## 2021-11-04 PROCEDURE — 82805 BLOOD GASES W/O2 SATURATION: CPT | Performed by: INTERNAL MEDICINE

## 2021-11-04 PROCEDURE — 84145 PROCALCITONIN (PCT): CPT | Performed by: INTERNAL MEDICINE

## 2021-11-04 RX ORDER — FUROSEMIDE 10 MG/ML
40 INJECTION INTRAMUSCULAR; INTRAVENOUS ONCE
Status: COMPLETED | OUTPATIENT
Start: 2021-11-04 | End: 2021-11-04

## 2021-11-04 RX ORDER — VANCOMYCIN HYDROCHLORIDE 1 G/200ML
12.5 INJECTION, SOLUTION INTRAVENOUS ONCE
Status: DISCONTINUED | OUTPATIENT
Start: 2021-11-04 | End: 2021-11-04

## 2021-11-04 RX ORDER — POTASSIUM CHLORIDE 20MEQ/15ML
40 LIQUID (ML) ORAL ONCE
Status: COMPLETED | OUTPATIENT
Start: 2021-11-04 | End: 2021-11-04

## 2021-11-04 RX ORDER — AMIODARONE HYDROCHLORIDE 200 MG/1
200 TABLET ORAL 2 TIMES DAILY WITH MEALS
Status: DISCONTINUED | OUTPATIENT
Start: 2021-11-04 | End: 2021-11-23 | Stop reason: HOSPADM

## 2021-11-04 RX ORDER — POTASSIUM CHLORIDE 14.9 MG/ML
20 INJECTION INTRAVENOUS ONCE
Status: COMPLETED | OUTPATIENT
Start: 2021-11-04 | End: 2021-11-04

## 2021-11-04 RX ORDER — LINEZOLID 2 MG/ML
600 INJECTION, SOLUTION INTRAVENOUS EVERY 12 HOURS
Status: DISCONTINUED | OUTPATIENT
Start: 2021-11-04 | End: 2021-11-04

## 2021-11-04 RX ADMIN — ACETAMINOPHEN 975 MG: 650 SUSPENSION ORAL at 00:07

## 2021-11-04 RX ADMIN — Medication 20 MG: at 06:08

## 2021-11-04 RX ADMIN — LINEZOLID 300 MG: 600 INJECTION, SOLUTION INTRAVENOUS at 13:20

## 2021-11-04 RX ADMIN — ACETAMINOPHEN 975 MG: 650 SUSPENSION ORAL at 07:54

## 2021-11-04 RX ADMIN — LINEZOLID 300 MG: 600 INJECTION, SOLUTION INTRAVENOUS at 23:41

## 2021-11-04 RX ADMIN — SODIUM CHLORIDE 4 UNITS/HR: 9 INJECTION, SOLUTION INTRAVENOUS at 15:01

## 2021-11-04 RX ADMIN — TICAGRELOR 90 MG: 90 TABLET ORAL at 20:14

## 2021-11-04 RX ADMIN — HEPARIN SODIUM 15.1 UNITS/KG/HR: 10000 INJECTION, SOLUTION INTRAVENOUS at 04:58

## 2021-11-04 RX ADMIN — HEPARIN SODIUM 17.1 UNITS/KG/HR: 10000 INJECTION, SOLUTION INTRAVENOUS at 22:56

## 2021-11-04 RX ADMIN — ATORVASTATIN CALCIUM 40 MG: 40 TABLET, FILM COATED ORAL at 16:03

## 2021-11-04 RX ADMIN — LEVALBUTEROL HYDROCHLORIDE 1.25 MG: 1.25 SOLUTION, CONCENTRATE RESPIRATORY (INHALATION) at 19:27

## 2021-11-04 RX ADMIN — CHLORHEXIDINE GLUCONATE 15 ML: 1.2 SOLUTION ORAL at 08:03

## 2021-11-04 RX ADMIN — POTASSIUM CHLORIDE 40 MEQ: 20 SOLUTION ORAL at 07:54

## 2021-11-04 RX ADMIN — LEVALBUTEROL HYDROCHLORIDE 1.25 MG: 1.25 SOLUTION, CONCENTRATE RESPIRATORY (INHALATION) at 13:15

## 2021-11-04 RX ADMIN — FUROSEMIDE 40 MG: 10 INJECTION, SOLUTION INTRAVENOUS at 16:09

## 2021-11-04 RX ADMIN — VANCOMYCIN HYDROCHLORIDE 1000 MG: 1 INJECTION, SOLUTION INTRAVENOUS at 10:50

## 2021-11-04 RX ADMIN — IPRATROPIUM BROMIDE 0.5 MG: 0.5 SOLUTION RESPIRATORY (INHALATION) at 08:03

## 2021-11-04 RX ADMIN — DICYCLOMINE HYDROCHLORIDE 20 MG: 20 TABLET ORAL at 00:06

## 2021-11-04 RX ADMIN — AMIODARONE HYDROCHLORIDE 200 MG: 200 TABLET ORAL at 16:03

## 2021-11-04 RX ADMIN — LEVALBUTEROL HYDROCHLORIDE 1.25 MG: 1.25 SOLUTION, CONCENTRATE RESPIRATORY (INHALATION) at 08:03

## 2021-11-04 RX ADMIN — ACETAMINOPHEN 975 MG: 650 SUSPENSION ORAL at 16:03

## 2021-11-04 RX ADMIN — PREGABALIN 75 MG: 75 CAPSULE ORAL at 08:03

## 2021-11-04 RX ADMIN — IPRATROPIUM BROMIDE 0.5 MG: 0.5 SOLUTION RESPIRATORY (INHALATION) at 19:27

## 2021-11-04 RX ADMIN — TICAGRELOR 90 MG: 90 TABLET ORAL at 08:04

## 2021-11-04 RX ADMIN — DICYCLOMINE HYDROCHLORIDE 20 MG: 20 TABLET ORAL at 23:41

## 2021-11-04 RX ADMIN — ASPIRIN 81 MG CHEWABLE TABLET 81 MG: 81 TABLET CHEWABLE at 08:03

## 2021-11-04 RX ADMIN — DICYCLOMINE HYDROCHLORIDE 20 MG: 20 TABLET ORAL at 10:54

## 2021-11-04 RX ADMIN — POTASSIUM CHLORIDE 20 MEQ: 14.9 INJECTION, SOLUTION INTRAVENOUS at 17:03

## 2021-11-04 RX ADMIN — DICYCLOMINE HYDROCHLORIDE 20 MG: 20 TABLET ORAL at 06:08

## 2021-11-04 RX ADMIN — IPRATROPIUM BROMIDE 0.5 MG: 0.5 SOLUTION RESPIRATORY (INHALATION) at 13:15

## 2021-11-04 RX ADMIN — CHLORHEXIDINE GLUCONATE 15 ML: 1.2 SOLUTION ORAL at 20:14

## 2021-11-04 RX ADMIN — LIDOCAINE 5% 1 PATCH: 700 PATCH TOPICAL at 09:22

## 2021-11-04 RX ADMIN — DICYCLOMINE HYDROCHLORIDE 20 MG: 20 TABLET ORAL at 17:09

## 2021-11-04 RX ADMIN — MELATONIN 6 MG: at 22:18

## 2021-11-04 NOTE — ED PROVIDER NOTES
He will have to use OTC pain meds.   He will have to fu first available for MRi fu History  Chief Complaint   Patient presents with    Earache     c/oleft ear and jaw paon that started yesterday as well as left elbow pain, acute on chronic pain     This is a 60-year-old female presenting to the ED for evaluation left jaw and ear discomfort the past 2 days  She states she feels like it is radiating from her left upper jaw into her ear  Denies any chest pain or shortness of breath  She states she does have dental problems, does smoke tobacco products and is diabetic  Denies any fevers or chills, difficulty opening mouth  She admits to increased pain with jaw movement and manipulation of her left ear  History provided by:  Patient   used: No    Earache  Location:  Left  Behind ear:  No abnormality  Quality:  Aching and throbbing  Severity:  Moderate  Onset quality:  Gradual  Duration:  2 days  Timing:  Constant  Progression:  Worsening  Chronicity:  New  Relieved by:  Nothing  Worsened by:  Nothing      Prior to Admission Medications   Prescriptions Last Dose Informant Patient Reported? Taking?    ADMELOG SOLOSTAR 100 units/mL injection pen 3/8/2021 at Unknown time Self No Yes   Sig: INJECT 20 UNIT 3 TIMES A DAY BY SUBCUTANEOUS ROUTE   Patient taking differently: Inject 10 Units under the skin 3 (three) times a day with meals    ALPRAZolam (XANAX) 0 5 mg tablet 3/8/2021 at Unknown time  No Yes   Sig: "ONE-HALF" TABLET TWO TIMES A DAY   [facility] IRON 240 (27 Fe) MG tablet 3/8/2021 at Unknown time Self No Yes   Sig: TAKE 1 TABLET (240 MG TOTAL) BY MOUTH 3 (THREE) TIMES A DAY WITH MEALS   Insulin Disposable Pump (V-Go 40) KIT 3/8/2021 at Unknown time  No Yes   Sig: Use daily   Insulin Pen Needle (UNIFINE PENTIPS PLUS) 31G X 6 MM MISC 3/8/2021 at Unknown time Self Yes Yes   Si Device by Does not apply route 4 (four) times a day   Lancets MISC 3/8/2021 at Unknown time Self Yes Yes   Si Device by Does not apply route 4 (four) times a day   Magnesium 400 MG TABS 3/8/2021 at Unknown time Self Yes Yes   Sig: Take by mouth   TRELEGY ELLIPTA 100-62 5-25 MCG/INH inhaler 3/8/2021 at Unknown time Self No Yes   Sig: ONE PUFF EVERY DAY   Trulicity 1 5 TJ/2 6OW SOPN 3/8/2021 at Unknown time  No Yes   Sig: INJECT 0 5 ML EVERY WEEK BY SUBCUTANEOUS ROUTE    amLODIPine (NORVASC) 10 mg tablet 3/8/2021 at Unknown time Self Yes Yes   Sig: Take 10 mg by mouth daily   atorvastatin (LIPITOR) 40 mg tablet 3/8/2021 at Unknown time  No Yes   Sig: Take 1 tablet (40 mg total) by mouth daily   bacitracin topical ointment 500 units/g topical ointment 3/8/2021 at Unknown time  No Yes   Sig: Apply to bilateral nares twice per day     ciprofloxacin-dexamethasone (CIPRODEX) otic suspension 3/8/2021 at Unknown time  No Yes   Sig: Administer 4 drops to the right ear 2 (two) times a day   cloNIDine (CATAPRES-TTS-3) 0 3 mg/24 hr 3/8/2021 at Unknown time  No Yes   Sig: APPLY 1 PATCH EVERY WEEK BY TRANSDERMAL   cyclobenzaprine (FLEXERIL) 10 mg tablet  Self No No   Sig: Take 1 tablet (10 mg total) by mouth 3 (three) times a day as needed for muscle spasms for up to 10 days   dexamethasone (DECADRON) 1 mg tablet 3/8/2021 at Unknown time  No Yes   Sig: Take 1 tablet (1 mg total) by mouth 2 (two) times a day with meals Take tablet between 10-11pm and then have lab next day in the morning 7-9am    diclofenac (VOLTAREN) 75 mg EC tablet 3/8/2021 at Unknown time Self No Yes   Sig: Take 1 tablet (75 mg total) by mouth 2 (two) times a day   doxazosin (CARDURA) 4 mg tablet 3/8/2021 at Unknown time Self Yes Yes   Sig: Take 4 mg by mouth daily   ergocalciferol (VITAMIN D2) 50,000 units 3/8/2021 at Unknown time Self No Yes   Sig: Take 1 capsule (50,000 Units total) by mouth 2 (two) times a week   fenofibrate (TRICOR) 145 mg tablet 3/8/2021 at Unknown time  No Yes   Sig: TAKE ONE TABLET EVERY DAY   hydrALAZINE (APRESOLINE) 50 mg tablet 3/8/2021 at Unknown time  No Yes   Sig: TAKE TWO TABLETS (100MG) TWO TIMES A DAY   hydrochlorothiazide (HYDRODIURIL) 25 mg tablet 3/8/2021 at Unknown time Self No Yes   Sig: TAKE ONE TABLET EVERY DAY   ibuprofen (MOTRIN) 800 mg tablet 3/8/2021 at Unknown time Self No Yes   Sig: Take 1 tablet (800 mg total) by mouth every 6 (six) hours as needed for moderate pain   insulin aspart (NovoLOG) 100 units/mL injection 3/8/2021 at Unknown time  No Yes   Sig: Use 40 units daily into the pump as a basal rate insulin with 10 units (5clicks from pump) before meals   insulin glargine (Basaglar KwikPen) 100 units/mL injection pen 3/8/2021 at Unknown time  No Yes   Si UNITS AT BEDTIME SUBCUTANEOUSLY   insulin lispro (HumaLOG) 100 units/mL injection 3/8/2021 at Unknown time  No Yes   Sig: Use 40 units daily into the pump as a basal rate insulin with 10 units (5clicks from pump) before meals     lidocaine (LIDODERM) 5 % 3/8/2021 at Unknown time Self No Yes   Sig: Apply 1 patch topically daily Remove & Discard patch within 12 hours or as directed by MD   lisinopril (ZESTRIL) 40 mg tablet 3/8/2021 at Unknown time Self No Yes   Sig: TAKE ONE TABLET TWO TIMES A DAY   Patient taking differently: 40 mg daily    meclizine (ANTIVERT) 25 mg tablet 3/8/2021 at Unknown time Self No Yes   Sig: Take 1 tablet (25 mg total) by mouth every 8 (eight) hours as needed for dizziness   metFORMIN (GLUCOPHAGE) 1000 MG tablet 3/8/2021 at Unknown time  No Yes   Sig: TAKE 1 TABLET TWICE A DAY BY ORAL ROUTE   metoprolol succinate (TOPROL-XL) 200 MG 24 hr tablet 3/8/2021 at Unknown time  No Yes   Sig: TAKE 1 TABLET EVERY DAY BY ORAL ROUTE   mupirocin (BACTROBAN) 2 % nasal ointment 3/8/2021 at Unknown time  No Yes   Sig: into each nostril 2 (two) times a day   pantoprazole (PROTONIX) 20 mg tablet 3/8/2021 at Unknown time Self No Yes   Sig: Take 2 tablets (40 mg total) by mouth daily   pioglitazone (ACTOS) 30 mg tablet 3/8/2021 at Unknown time  No Yes   Sig: Take 1 tablet (30 mg total) by mouth daily   pregabalin (LYRICA) 75 mg capsule 3/8/2021 at Unknown time  No Yes   Sig: TAKE ONE CAPSULE EVERY DAY   spironolactone (ALDACTONE) 25 mg tablet 3/8/2021 at Unknown time Self Yes Yes   Sig: Take 25 mg by mouth daily    zolpidem (AMBIEN) 10 mg tablet 3/8/2021 at Unknown time  No Yes   Sig: TAKE 1 TABLET (10 MG TOTAL) BY MOUTH DAILY AT BEDTIME AS NEEDED FOR SLEEP      Facility-Administered Medications: None       Past Medical History:   Diagnosis Date    Anxiety     Aortic aneurysm (HCC)     Arthritis     Depression     Diabetes mellitus (HCC)     Fibromyalgia     GERD (gastroesophageal reflux disease)     H/O cardiovascular stress test 2018    no ischemia  EF 70%   H/O echocardiogram 2019    EF 60%  Mild LVH  trivial effusion   Hyperlipidemia     Hypertension     Migraines     Psychiatric disorder     anxiety       Past Surgical History:   Procedure Laterality Date    BACK SURGERY      Lumbar epidural steroid injection    CARPAL TUNNEL RELEASE Left      SECTION      CHOLECYSTECTOMY      COLONOSCOPY      incomplete    HYSTERECTOMY      Total    OVARIAN CYST REMOVAL      TUBAL LIGATION         Family History   Problem Relation Age of Onset    Hypertension Mother    [facility] Arthritis Mother     Diabetes Father     Other Sister         renal cell carcinoma    Diabetes Other      I have reviewed and agree with the history as documented  E-Cigarette/Vaping    E-Cigarette Use Never User      E-Cigarette/Vaping Substances    Nicotine No     THC No     CBD No     Flavoring No     Other No     Unknown No      Social History     Tobacco Use    Smoking status: Current Every Day Smoker     Packs/day: 1 00     Years: 30 00     Pack years: 30 00     Types: Cigarettes    Smokeless tobacco: Never Used   Substance Use Topics    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   Drug use: Not Currently     Types: Cocaine       Review of Systems   HENT: Positive for ear pain      All other systems reviewed and are negative  Physical Exam  Physical Exam  Vitals signs and nursing note reviewed  Constitutional:       General: She is not in acute distress  Appearance: Normal appearance  She is well-developed and normal weight  HENT:      Head: Normocephalic and atraumatic  Right Ear: Tympanic membrane, ear canal and external ear normal       Left Ear: Tympanic membrane, ear canal and external ear normal       Nose: Nose normal       Mouth/Throat:      Mouth: Mucous membranes are moist       Dentition: Abnormal dentition  Dental tenderness and dental caries present  Pharynx: Oropharynx is clear  Comments: Multiple missing teeth  L upper posterior molars tender to palpation, dental caries and decay present  No definite abscess  Eyes:      Extraocular Movements: Extraocular movements intact  Conjunctiva/sclera: Conjunctivae normal       Pupils: Pupils are equal, round, and reactive to light  Neck:      Musculoskeletal: Neck supple  Cardiovascular:      Rate and Rhythm: Normal rate and regular rhythm  Heart sounds: No murmur  Pulmonary:      Effort: Pulmonary effort is normal  No respiratory distress  Breath sounds: Normal breath sounds  Abdominal:      Tenderness: There is no abdominal tenderness  Skin:     General: Skin is warm and dry  Capillary Refill: Capillary refill takes less than 2 seconds  Neurological:      General: No focal deficit present  Mental Status: She is alert  Mental status is at baseline     Psychiatric:         Mood and Affect: Mood normal          Behavior: Behavior normal          Vital Signs  ED Triage Vitals   Temperature Pulse Respirations Blood Pressure SpO2   03/08/21 2035 03/08/21 2035 03/08/21 2035 03/08/21 2035 03/08/21 2035   98 2 °F (36 8 °C) 95 18 (!) 204/105 100 %      Temp Source Heart Rate Source Patient Position - Orthostatic VS BP Location FiO2 (%)   03/08/21 2035 03/08/21 2035 03/08/21 2141 03/08/21 2141 --   Oral Monitor Sitting Left arm       Pain Score       03/08/21 2035       9           Vitals:    03/08/21 2035 03/08/21 2141 03/08/21 2224   BP: (!) 204/105 (!) 178/92 (!) 172/92   Pulse: 95  78   Patient Position - Orthostatic VS:  Sitting Sitting         Visual Acuity      ED Medications  Medications   ketorolac (TORADOL) injection 15 mg (15 mg Intravenous Given 3/8/21 2129)   HYDROmorphone (DILAUDID) injection 0 5 mg (0 5 mg Intravenous Given 3/8/21 2130)   ondansetron (ZOFRAN) injection 4 mg (4 mg Intravenous Given 3/8/21 2129)   amoxicillin (AMOXIL) capsule 500 mg (500 mg Oral Given 3/8/21 2130)       Diagnostic Studies  Results Reviewed     Procedure Component Value Units Date/Time    Basic metabolic panel [[id number]]  (Abnormal) Collected: 03/08/21 2125    Lab Status: Final result Specimen: Blood from Arm, Left Updated: 03/08/21 2146     Sodium 136 mmol/L      Potassium 3 3 mmol/L      Chloride 99 mmol/L      CO2 27 mmol/L      ANION GAP 10 mmol/L      BUN 13 mg/dL      Creatinine 0 72 mg/dL      Glucose 407 mg/dL      Calcium 8 9 mg/dL      eGFR 110 ml/min/1 73sq m     Narrative:      [place] guidelines for Chronic Kidney Disease (CKD):     Stage 1 with normal or high GFR (GFR > 90 mL/min/1 73 square meters)    Stage 2 Mild CKD (GFR = 60-89 mL/min/1 73 square meters)    Stage 3A Moderate CKD (GFR = 45-59 mL/min/1 73 square meters)    Stage 3B Moderate CKD (GFR = 30-44 mL/min/1 73 square meters)    Stage 4 Severe CKD (GFR = 15-29 mL/min/1 73 square meters)    Stage 5 End Stage CKD (GFR <15 mL/min/1 73 square meters)  Note: GFR calculation is accurate only with a steady state creatinine    CBC and differential [[id number]]  (Abnormal) Collected: 03/08/21 2125    Lab Status: Final result Specimen: Blood from Arm, Left Updated: 03/08/21 2131     WBC 8 96 Thousand/uL      RBC 5 36 Million/uL      Hemoglobin 14 8 g/dL      Hematocrit 45 4 %      MCV 85 fL      MCH 27 6 pg      MCHC 32 6 g/dL RDW 14 1 %      MPV 11 8 fL      Platelets 834 Thousands/uL      Neutrophils Relative 54 %      Immat GRANS % 0 %      Lymphocytes Relative 36 %      Monocytes Relative 7 %      Eosinophils Relative 2 %      Basophils Relative 1 %      Neutrophils Absolute 4 91 Thousands/µL      Immature Grans Absolute 0 01 Thousand/uL      Lymphocytes Absolute 3 22 Thousands/µL      Monocytes Absolute 0 64 Thousand/µL      Eosinophils Absolute 0 13 Thousand/µL      Basophils Absolute 0 05 Thousands/µL                  No orders to display              Procedures  ECG 12 Lead Documentation Only    Date/Time: 3/8/2021 8:30 PM  Performed by: Mary Dailey DO  Authorized by: Mary Dailey DO     Indications / Diagnosis:  L jaw and ear pain  ECG reviewed by me, the ED Provider: yes    Interpretation:     Interpretation: non-specific    Rate:     ECG rate:  86    ECG rate assessment: normal    Rhythm:     Rhythm: sinus rhythm    Ectopy:     Ectopy: none    QRS:     QRS axis:  Normal    QRS intervals:  Normal  Conduction:     Conduction: abnormal      Abnormal conduction: LAFB    ST segments:     ST segments:  Normal  T waves:     T waves: normal    Other findings:     Other findings: LVH               ED Course                             SBIRT 20yo+      Most Recent Value   SBIRT (24 yo +)   In order to provide better care to our patients, we are screening all of our patients for alcohol and drug use  Would it be okay to ask you these screening questions? Yes Filed at: 03/08/2021 2059   Initial Alcohol Screen: US AUDIT-C    1  How often do you have a drink containing alcohol?  0 Filed at: 03/08/2021 2059   2  How many drinks containing alcohol do you have on a typical day you are drinking? 0 Filed at: 03/08/2021 2059   3a  Male UNDER 65: How often do you have five or more drinks on one occasion? 0 Filed at: 03/08/2021 2059   3b  FEMALE Any Age, or MALE 65+: How often do you have 4 or more drinks on one occassion?   0 Filed at: 03/08/2021 2059   Audit-C Score  0 Filed at: 03/08/2021 2059   PAVEL: How many times in the past year have you    Used an illegal drug or used a prescription medication for non-medical reasons? Never Filed at: 03/08/2021 2059                    MDM  Number of Diagnoses or Management Options  Dental caries:   Dental infection:   Hypertension:   Otalgia of left ear:   Diagnosis management comments: 49 yo F w/ left jaw pain rad to ear  Suspect dental origin, developing dental infection/abscess, poor dentition, dental caries with tenderness to left upper posterior molars  Start on antibiotics, f/u pcp and dentist          Amount and/or Complexity of Data Reviewed  Clinical lab tests: ordered and reviewed  Independent visualization of images, tracings, or specimens: yes    Risk of Complications, Morbidity, and/or Mortality  Presenting problems: moderate  Diagnostic procedures: moderate  Management options: moderate    Patient Progress  Patient progress: stable      Disposition  Final diagnoses:   Otalgia of left ear   Dental caries   Dental infection   Hypertension     Time reflects when diagnosis was documented in both MDM as applicable and the Disposition within this note     Time User Action Codes Description Comment    3/8/2021  9:58 PM Niharika Royals Add [H92 02] Otalgia of left ear     3/8/2021  9:58 PM Niharika Royals Add [K02 9] Dental caries     3/8/2021  9:58 PM Niharika Royals Add [K04 7] Dental infection     3/8/2021  9:58 PM Niharika Royals Add [I10] Hypertension     3/8/2021  9:58 PM Niharika Royals Modify [H92 02] Otalgia of left ear     3/8/2021  9:58 PM Niharika Royals Modify [K02 9] Dental caries       ED Disposition     ED Disposition Condition Date/Time Comment    Discharge Stable Mon Mar 8, 2021  9:58 PM Kelli Jarrell discharge to home/self care              Follow-up Information     Follow up With Specialties Details Why Contact Info    Idalia Corey MD Family Medicine In 3 days  [address] [place] [address] Mejia Stout  [phone]            Discharge Medication List as of 3/8/2021 10:01 PM      START taking these medications    Details   amoxicillin (AMOXIL) 500 mg capsule Take 1 capsule (500 mg total) by mouth 3 (three) times a day for 7 days, Starting Mon 3/8/2021, Until Mon 3/15/2021, Normal      oxyCODONE-acetaminophen (PERCOCET) 5-325 mg per tablet Take 1 tablet by mouth every 4 (four) hours as needed for moderate pain for up to 8 dosesMax Daily Amount: 6 tablets, Starting Mon 3/8/2021, Normal         CONTINUE these medications which have NOT CHANGED    Details   ADMELOG SOLOSTAR 100 units/mL injection pen INJECT 20 UNIT 3 TIMES A DAY BY SUBCUTANEOUS ROUTE, Normal      ALPRAZolam (XANAX) 0 5 mg tablet "ONE-HALF" TABLET TWO TIMES A DAY, Normal      amLODIPine (NORVASC) 10 mg tablet Take 10 mg by mouth daily, Historical Med      atorvastatin (LIPITOR) 40 mg tablet Take 1 tablet (40 mg total) by mouth daily, Starting Thu 1/14/2021, Normal      bacitracin topical ointment 500 units/g topical ointment Apply to bilateral nares twice per day , Normal      ciprofloxacin-dexamethasone (CIPRODEX) otic suspension Administer 4 drops to the right ear 2 (two) times a day, Starting Mon 1/4/2021, Print      cloNIDine (CATAPRES-TTS-3) 0 3 mg/24 hr APPLY 1 PATCH EVERY WEEK BY TRANSDERMAL, Normal      [facility] IRON 240 (27 Fe) MG tablet TAKE 1 TABLET (240 MG TOTAL) BY MOUTH 3 (THREE) TIMES A DAY WITH MEALS, Normal      cyclobenzaprine (FLEXERIL) 10 mg tablet Take 1 tablet (10 mg total) by mouth 3 (three) times a day as needed for muscle spasms for up to 10 days, Starting Tue 5/5/2020, Until Mon 2/15/2021, Normal      dexamethasone (DECADRON) 1 mg tablet Take 1 tablet (1 mg total) by mouth 2 (two) times a day with meals Take tablet between 10-11pm and then have lab next day in the morning 7-9am , Starting Thu 12/3/2020, Normal      diclofenac (VOLTAREN) 75 mg EC tablet Take 1 tablet (75 mg total) by mouth 2 (two) times a day, Starting Mon 6/15/2020, Normal      doxazosin (CARDURA) 4 mg tablet Take 4 mg by mouth daily, Historical Med      ergocalciferol (VITAMIN D2) 50,000 units Take 1 capsule (50,000 Units total) by mouth 2 (two) times a week, Starting u 5/28/2020, Normal      fenofibrate (TRICOR) 145 mg tablet TAKE ONE TABLET EVERY DAY, Normal      hydrALAZINE (APRESOLINE) 50 mg tablet TAKE TWO TABLETS (100MG) TWO TIMES A DAY, Normal      hydrochlorothiazide (HYDRODIURIL) 25 mg tablet TAKE ONE TABLET EVERY DAY, Normal      ibuprofen (MOTRIN) 800 mg tablet Take 1 tablet (800 mg total) by mouth every 6 (six) hours as needed for moderate pain, Starting Wed 8/5/2020, Normal      insulin aspart (NovoLOG) 100 units/mL injection Use 40 units daily into the pump as a basal rate insulin with 10 units (5clicks from pump) before meals, Normal      Insulin Disposable Pump (V-Go 40) KIT Use daily, Starting Thu 12/3/2020, Normal      insulin glargine (Basaglar KwikPen) 100 units/mL injection pen 80 UNITS AT BEDTIME SUBCUTANEOUSLY, Normal      insulin lispro (HumaLOG) 100 units/mL injection Use 40 units daily into the pump as a basal rate insulin with 10 units (5clicks from pump) before meals  , Normal      Insulin Pen Needle (UNIFINE PENTIPS PLUS) 31G X 6 MM MISC 1 Device by Does not apply route 4 (four) times a day, Starting Mon 6/5/2017, Historical Med      Lancets MISC 1 Device by Does not apply route 4 (four) times a day, Historical Med      lidocaine (LIDODERM) 5 % Apply 1 patch topically daily Remove & Discard patch within 12 hours or as directed by MD, Starting Sun 9/20/2020, Print      lisinopril (ZESTRIL) 40 mg tablet TAKE ONE TABLET TWO TIMES A DAY, Normal      Magnesium 400 MG TABS Take by mouth, Historical Med      meclizine (ANTIVERT) 25 mg tablet Take 1 tablet (25 mg total) by mouth every 8 (eight) hours as needed for dizziness, Starting Sun 10/7/2018, Print      metFORMIN (GLUCOPHAGE) 1000 MG tablet TAKE 1 TABLET TWICE A DAY BY ORAL ROUTE, Normal      metoprolol succinate (TOPROL-XL) 200 MG 24 hr tablet TAKE 1 TABLET EVERY DAY BY ORAL ROUTE, Normal      mupirocin (BACTROBAN) 2 % nasal ointment into each nostril 2 (two) times a day, Starting Thu 1/14/2021, Normal      pantoprazole (PROTONIX) 20 mg tablet Take 2 tablets (40 mg total) by mouth daily, Starting Tue 7/2/2019, Print      pioglitazone (ACTOS) 30 mg tablet Take 1 tablet (30 mg total) by mouth daily, Starting Thu 12/3/2020, Normal      pregabalin (LYRICA) 75 mg capsule TAKE ONE CAPSULE EVERY DAY, Normal      spironolactone (ALDACTONE) 25 mg tablet Take 25 mg by mouth daily , Historical Med      TRELEGY ELLIPTA 100-62 5-25 MCG/INH inhaler ONE PUFF EVERY DAY, Normal      Trulicity 1 5 JH/8 1CA SOPN INJECT 0 5 ML EVERY WEEK BY SUBCUTANEOUS ROUTE , Normal      zolpidem (AMBIEN) 10 mg tablet TAKE 1 TABLET (10 MG TOTAL) BY MOUTH DAILY AT BEDTIME AS NEEDED FOR SLEEP, Starting Fri 1/29/2021, Normal           No discharge procedures on file      PDMP Review       Value Time User    PDMP Reviewed  Yes 3/8/2021  9:59 PM Ran Nice DO          ED Provider  Electronically Signed by           Ran Nice DO  03/09/21 [id number]

## 2021-11-05 ENCOUNTER — APPOINTMENT (INPATIENT)
Dept: RADIOLOGY | Facility: HOSPITAL | Age: 56
DRG: 003 | End: 2021-11-05
Payer: MEDICARE

## 2021-11-05 PROBLEM — I67.1 CEREBRAL ANEURYSM: Status: ACTIVE | Noted: 2021-11-05

## 2021-11-05 PROBLEM — I63.9 CEREBRAL VASCULAR ACCIDENT (HCC): Status: ACTIVE | Noted: 2021-11-04

## 2021-11-05 LAB
ANION GAP SERPL CALCULATED.3IONS-SCNC: 8 MMOL/L (ref 4–13)
APTT PPP: 45 SECONDS (ref 23–37)
APTT PPP: 57 SECONDS (ref 23–37)
APTT PPP: 73 SECONDS (ref 23–37)
APTT PPP: 74 SECONDS (ref 23–37)
APTT PPP: 97 SECONDS (ref 23–37)
BASE EX.OXY STD BLDV CALC-SCNC: 72.9 % (ref 60–80)
BASE EXCESS BLDV CALC-SCNC: 5.2 MMOL/L
BUN SERPL-MCNC: 81 MG/DL (ref 5–25)
CA-I BLD-SCNC: 0.99 MMOL/L (ref 1.12–1.32)
CALCIUM SERPL-MCNC: 8.6 MG/DL (ref 8.3–10.1)
CHLORIDE SERPL-SCNC: 104 MMOL/L (ref 100–108)
CO2 SERPL-SCNC: 25 MMOL/L (ref 21–32)
CREAT SERPL-MCNC: 1.57 MG/DL (ref 0.6–1.3)
ERYTHROCYTE [DISTWIDTH] IN BLOOD BY AUTOMATED COUNT: 17.5 % (ref 11.6–15.1)
EST. AVERAGE GLUCOSE BLD GHB EST-MCNC: 189 MG/DL
GFR SERPL CREATININE-BSD FRML MDRD: 37 ML/MIN/1.73SQ M
GLUCOSE SERPL-MCNC: 111 MG/DL (ref 65–140)
GLUCOSE SERPL-MCNC: 133 MG/DL (ref 65–140)
GLUCOSE SERPL-MCNC: 143 MG/DL (ref 65–140)
GLUCOSE SERPL-MCNC: 143 MG/DL (ref 65–140)
GLUCOSE SERPL-MCNC: 145 MG/DL (ref 65–140)
GLUCOSE SERPL-MCNC: 155 MG/DL (ref 65–140)
GLUCOSE SERPL-MCNC: 178 MG/DL (ref 65–140)
GLUCOSE SERPL-MCNC: 179 MG/DL (ref 65–140)
GLUCOSE SERPL-MCNC: 182 MG/DL (ref 65–140)
GLUCOSE SERPL-MCNC: 184 MG/DL (ref 65–140)
GLUCOSE SERPL-MCNC: 193 MG/DL (ref 65–140)
GLUCOSE SERPL-MCNC: 225 MG/DL (ref 65–140)
GLUCOSE SERPL-MCNC: 239 MG/DL (ref 65–140)
HBA1C MFR BLD: 8.2 %
HCO3 BLDV-SCNC: 29.9 MMOL/L (ref 24–30)
HCT VFR BLD AUTO: 24.2 % (ref 34.8–46.1)
HGB BLD-MCNC: 7.4 G/DL (ref 11.5–15.4)
IPAP: 15
MAGNESIUM SERPL-MCNC: 2.9 MG/DL (ref 1.6–2.6)
MCH RBC QN AUTO: 27 PG (ref 26.8–34.3)
MCHC RBC AUTO-ENTMCNC: 30.6 G/DL (ref 31.4–37.4)
MCV RBC AUTO: 88 FL (ref 82–98)
NON VENT- BIPAP: ABNORMAL
O2 CT BLDV-SCNC: 8.8 ML/DL
PCO2 BLDV: 44.9 MM HG (ref 42–50)
PEEP MAX SETTING VENT: 8 CM[H2O]
PH BLDV: 7.44 [PH] (ref 7.3–7.4)
PHOSPHATE SERPL-MCNC: 4.9 MG/DL (ref 2.7–4.5)
PLATELET # BLD AUTO: 652 THOUSANDS/UL (ref 149–390)
PMV BLD AUTO: 9.8 FL (ref 8.9–12.7)
PO2 BLDV: 40.5 MM HG (ref 35–45)
POTASSIUM SERPL-SCNC: 4.3 MMOL/L (ref 3.5–5.3)
PROCALCITONIN SERPL-MCNC: 2.72 NG/ML
RBC # BLD AUTO: 2.74 MILLION/UL (ref 3.81–5.12)
RYE IGE QN: NEGATIVE
SODIUM SERPL-SCNC: 137 MMOL/L (ref 136–145)
VENT BIPAP FIO2: 60 %
WBC # BLD AUTO: 21.71 THOUSAND/UL (ref 4.31–10.16)

## 2021-11-05 PROCEDURE — 71045 X-RAY EXAM CHEST 1 VIEW: CPT

## 2021-11-05 PROCEDURE — 85730 THROMBOPLASTIN TIME PARTIAL: CPT | Performed by: INTERNAL MEDICINE

## 2021-11-05 PROCEDURE — 99232 SBSQ HOSP IP/OBS MODERATE 35: CPT | Performed by: INTERNAL MEDICINE

## 2021-11-05 PROCEDURE — G1004 CDSM NDSC: HCPCS

## 2021-11-05 PROCEDURE — 85730 THROMBOPLASTIN TIME PARTIAL: CPT | Performed by: PHYSICIAN ASSISTANT

## 2021-11-05 PROCEDURE — 83735 ASSAY OF MAGNESIUM: CPT | Performed by: PHYSICIAN ASSISTANT

## 2021-11-05 PROCEDURE — 83036 HEMOGLOBIN GLYCOSYLATED A1C: CPT | Performed by: PHYSICIAN ASSISTANT

## 2021-11-05 PROCEDURE — 82948 REAGENT STRIP/BLOOD GLUCOSE: CPT

## 2021-11-05 PROCEDURE — 80176 ASSAY OF LIDOCAINE: CPT | Performed by: STUDENT IN AN ORGANIZED HEALTH CARE EDUCATION/TRAINING PROGRAM

## 2021-11-05 PROCEDURE — 99291 CRITICAL CARE FIRST HOUR: CPT | Performed by: PSYCHIATRY & NEUROLOGY

## 2021-11-05 PROCEDURE — 70544 MR ANGIOGRAPHY HEAD W/O DYE: CPT

## 2021-11-05 PROCEDURE — 99233 SBSQ HOSP IP/OBS HIGH 50: CPT | Performed by: FAMILY MEDICINE

## 2021-11-05 PROCEDURE — 85027 COMPLETE CBC AUTOMATED: CPT | Performed by: STUDENT IN AN ORGANIZED HEALTH CARE EDUCATION/TRAINING PROGRAM

## 2021-11-05 PROCEDURE — 99291 CRITICAL CARE FIRST HOUR: CPT | Performed by: STUDENT IN AN ORGANIZED HEALTH CARE EDUCATION/TRAINING PROGRAM

## 2021-11-05 PROCEDURE — 84145 PROCALCITONIN (PCT): CPT | Performed by: INTERNAL MEDICINE

## 2021-11-05 PROCEDURE — 84100 ASSAY OF PHOSPHORUS: CPT | Performed by: PHYSICIAN ASSISTANT

## 2021-11-05 PROCEDURE — 70551 MRI BRAIN STEM W/O DYE: CPT

## 2021-11-05 PROCEDURE — 70547 MR ANGIOGRAPHY NECK W/O DYE: CPT

## 2021-11-05 PROCEDURE — 94640 AIRWAY INHALATION TREATMENT: CPT

## 2021-11-05 PROCEDURE — 94669 MECHANICAL CHEST WALL OSCILL: CPT

## 2021-11-05 PROCEDURE — 94003 VENT MGMT INPAT SUBQ DAY: CPT

## 2021-11-05 PROCEDURE — 82330 ASSAY OF CALCIUM: CPT | Performed by: PHYSICIAN ASSISTANT

## 2021-11-05 PROCEDURE — 94760 N-INVAS EAR/PLS OXIMETRY 1: CPT

## 2021-11-05 PROCEDURE — 80048 BASIC METABOLIC PNL TOTAL CA: CPT | Performed by: STUDENT IN AN ORGANIZED HEALTH CARE EDUCATION/TRAINING PROGRAM

## 2021-11-05 PROCEDURE — 82805 BLOOD GASES W/O2 SATURATION: CPT | Performed by: STUDENT IN AN ORGANIZED HEALTH CARE EDUCATION/TRAINING PROGRAM

## 2021-11-05 RX ORDER — FUROSEMIDE 10 MG/ML
40 INJECTION INTRAMUSCULAR; INTRAVENOUS
Status: DISCONTINUED | OUTPATIENT
Start: 2021-11-05 | End: 2021-11-16

## 2021-11-05 RX ORDER — FENTANYL CITRATE 50 UG/ML
50 INJECTION, SOLUTION INTRAMUSCULAR; INTRAVENOUS EVERY 2 HOUR PRN
Status: DISCONTINUED | OUTPATIENT
Start: 2021-11-05 | End: 2021-11-11

## 2021-11-05 RX ORDER — DICYCLOMINE HCL 20 MG
20 TABLET ORAL EVERY 6 HOURS PRN
Status: DISCONTINUED | OUTPATIENT
Start: 2021-11-05 | End: 2021-11-16

## 2021-11-05 RX ORDER — ACETAMINOPHEN 160 MG/5ML
975 SUSPENSION, ORAL (FINAL DOSE FORM) ORAL EVERY 8 HOURS PRN
Status: DISCONTINUED | OUTPATIENT
Start: 2021-11-05 | End: 2021-11-14

## 2021-11-05 RX ADMIN — Medication 20 MG: at 05:08

## 2021-11-05 RX ADMIN — LEVALBUTEROL HYDROCHLORIDE 1.25 MG: 1.25 SOLUTION, CONCENTRATE RESPIRATORY (INHALATION) at 13:35

## 2021-11-05 RX ADMIN — IPRATROPIUM BROMIDE 0.5 MG: 0.5 SOLUTION RESPIRATORY (INHALATION) at 08:09

## 2021-11-05 RX ADMIN — SODIUM CHLORIDE 9 UNITS/HR: 9 INJECTION, SOLUTION INTRAVENOUS at 09:13

## 2021-11-05 RX ADMIN — AMIODARONE HYDROCHLORIDE 200 MG: 200 TABLET ORAL at 08:30

## 2021-11-05 RX ADMIN — CALCIUM GLUCONATE 3 G: 98 INJECTION, SOLUTION INTRAVENOUS at 09:13

## 2021-11-05 RX ADMIN — LEVALBUTEROL HYDROCHLORIDE 1.25 MG: 1.25 SOLUTION, CONCENTRATE RESPIRATORY (INHALATION) at 20:08

## 2021-11-05 RX ADMIN — ACETAMINOPHEN 975 MG: 650 SUSPENSION ORAL at 00:01

## 2021-11-05 RX ADMIN — IPRATROPIUM BROMIDE 0.5 MG: 0.5 SOLUTION RESPIRATORY (INHALATION) at 13:35

## 2021-11-05 RX ADMIN — ATORVASTATIN CALCIUM 40 MG: 40 TABLET, FILM COATED ORAL at 16:37

## 2021-11-05 RX ADMIN — DICYCLOMINE HYDROCHLORIDE 20 MG: 20 TABLET ORAL at 05:08

## 2021-11-05 RX ADMIN — PREGABALIN 75 MG: 75 CAPSULE ORAL at 09:41

## 2021-11-05 RX ADMIN — FUROSEMIDE 40 MG: 10 INJECTION, SOLUTION INTRAMUSCULAR; INTRAVENOUS at 16:37

## 2021-11-05 RX ADMIN — LEVALBUTEROL HYDROCHLORIDE 1.25 MG: 1.25 SOLUTION, CONCENTRATE RESPIRATORY (INHALATION) at 08:10

## 2021-11-05 RX ADMIN — TICAGRELOR 90 MG: 90 TABLET ORAL at 21:35

## 2021-11-05 RX ADMIN — CHLORHEXIDINE GLUCONATE 15 ML: 1.2 SOLUTION ORAL at 21:34

## 2021-11-05 RX ADMIN — LINEZOLID 300 MG: 600 INJECTION, SOLUTION INTRAVENOUS at 13:18

## 2021-11-05 RX ADMIN — LIDOCAINE 5% 1 PATCH: 700 PATCH TOPICAL at 08:31

## 2021-11-05 RX ADMIN — FENTANYL CITRATE 50 MCG: 50 INJECTION INTRAMUSCULAR; INTRAVENOUS at 05:08

## 2021-11-05 RX ADMIN — FUROSEMIDE 40 MG: 10 INJECTION, SOLUTION INTRAMUSCULAR; INTRAVENOUS at 10:06

## 2021-11-05 RX ADMIN — CHLORHEXIDINE GLUCONATE 15 ML: 1.2 SOLUTION ORAL at 08:30

## 2021-11-05 RX ADMIN — ASPIRIN 81 MG CHEWABLE TABLET 81 MG: 81 TABLET CHEWABLE at 08:30

## 2021-11-05 RX ADMIN — AMIODARONE HYDROCHLORIDE 200 MG: 200 TABLET ORAL at 16:37

## 2021-11-05 RX ADMIN — TICAGRELOR 90 MG: 90 TABLET ORAL at 08:30

## 2021-11-05 RX ADMIN — IPRATROPIUM BROMIDE 0.5 MG: 0.5 SOLUTION RESPIRATORY (INHALATION) at 20:08

## 2021-11-05 RX ADMIN — MELATONIN 6 MG: at 21:45

## 2021-11-05 RX ADMIN — FENTANYL CITRATE 50 MCG: 50 INJECTION INTRAMUSCULAR; INTRAVENOUS at 02:55

## 2021-11-06 LAB
ANION GAP SERPL CALCULATED.3IONS-SCNC: 6 MMOL/L (ref 4–13)
ANISOCYTOSIS BLD QL SMEAR: PRESENT
APTT PPP: 64 SECONDS (ref 23–37)
BASE EX.OXY STD BLDV CALC-SCNC: 25.2 % (ref 60–80)
BASE EXCESS BLDV CALC-SCNC: 6.8 MMOL/L
BASOPHILS # BLD MANUAL: 0 THOUSAND/UL (ref 0–0.1)
BASOPHILS NFR MAR MANUAL: 0 % (ref 0–1)
BUN SERPL-MCNC: 71 MG/DL (ref 5–25)
CA-I BLD-SCNC: 1.08 MMOL/L (ref 1.12–1.32)
CALCIUM SERPL-MCNC: 8.9 MG/DL (ref 8.3–10.1)
CHLORIDE SERPL-SCNC: 102 MMOL/L (ref 100–108)
CO2 SERPL-SCNC: 30 MMOL/L (ref 21–32)
CREAT SERPL-MCNC: 1.31 MG/DL (ref 0.6–1.3)
EOSINOPHIL # BLD MANUAL: 0 THOUSAND/UL (ref 0–0.4)
EOSINOPHIL NFR BLD MANUAL: 0 % (ref 0–6)
ERYTHROCYTE [DISTWIDTH] IN BLOOD BY AUTOMATED COUNT: 17 % (ref 11.6–15.1)
GFR SERPL CREATININE-BSD FRML MDRD: 46 ML/MIN/1.73SQ M
GLUCOSE SERPL-MCNC: 111 MG/DL (ref 65–140)
GLUCOSE SERPL-MCNC: 150 MG/DL (ref 65–140)
GLUCOSE SERPL-MCNC: 150 MG/DL (ref 65–140)
GLUCOSE SERPL-MCNC: 157 MG/DL (ref 65–140)
GLUCOSE SERPL-MCNC: 168 MG/DL (ref 65–140)
GLUCOSE SERPL-MCNC: 170 MG/DL (ref 65–140)
GLUCOSE SERPL-MCNC: 177 MG/DL (ref 65–140)
GLUCOSE SERPL-MCNC: 179 MG/DL (ref 65–140)
GLUCOSE SERPL-MCNC: 179 MG/DL (ref 65–140)
GLUCOSE SERPL-MCNC: 190 MG/DL (ref 65–140)
GLUCOSE SERPL-MCNC: 204 MG/DL (ref 65–140)
GLUCOSE SERPL-MCNC: 208 MG/DL (ref 65–140)
GLUCOSE SERPL-MCNC: 211 MG/DL (ref 65–140)
HCO3 BLDV-SCNC: 31.1 MMOL/L (ref 24–30)
HCT VFR BLD AUTO: 24.7 % (ref 34.8–46.1)
HGB BLD-MCNC: 7.4 G/DL (ref 11.5–15.4)
HYPERCHROMIA BLD QL SMEAR: PRESENT
LYMPHOCYTES # BLD AUTO: 0.79 THOUSAND/UL (ref 0.6–4.47)
LYMPHOCYTES # BLD AUTO: 4 % (ref 14–44)
MAGNESIUM SERPL-MCNC: 2.5 MG/DL (ref 1.6–2.6)
MCH RBC QN AUTO: 26.6 PG (ref 26.8–34.3)
MCHC RBC AUTO-ENTMCNC: 30 G/DL (ref 31.4–37.4)
MCV RBC AUTO: 89 FL (ref 82–98)
MONOCYTES # BLD AUTO: 0.59 THOUSAND/UL (ref 0–1.22)
MONOCYTES NFR BLD: 3 % (ref 4–12)
MYELOCYTES NFR BLD MANUAL: 1 % (ref 0–1)
NEUTROPHILS # BLD MANUAL: 18 THOUSAND/UL (ref 1.85–7.62)
NEUTS BAND NFR BLD MANUAL: 1 % (ref 0–8)
NEUTS SEG NFR BLD AUTO: 90 % (ref 43–75)
NRBC BLD AUTO-RTO: 1 /100 WBC (ref 0–2)
O2 CT BLDV-SCNC: 1 ML/DL
PCO2 BLDV: 43.6 MM HG (ref 42–50)
PH BLDV: 7.47 [PH] (ref 7.3–7.4)
PHOSPHATE SERPL-MCNC: 4.3 MG/DL (ref 2.7–4.5)
PLATELET # BLD AUTO: 678 THOUSANDS/UL (ref 149–390)
PLATELET BLD QL SMEAR: ABNORMAL
PMV BLD AUTO: 9.9 FL (ref 8.9–12.7)
PO2 BLDV: 18.7 MM HG (ref 35–45)
POLYCHROMASIA BLD QL SMEAR: PRESENT
POTASSIUM SERPL-SCNC: 3.4 MMOL/L (ref 3.5–5.3)
RBC # BLD AUTO: 2.78 MILLION/UL (ref 3.81–5.12)
RBC MORPH BLD: PRESENT
SODIUM SERPL-SCNC: 138 MMOL/L (ref 136–145)
VARIANT LYMPHS # BLD AUTO: 1 %
WBC # BLD AUTO: 19.78 THOUSAND/UL (ref 4.31–10.16)

## 2021-11-06 PROCEDURE — 94640 AIRWAY INHALATION TREATMENT: CPT

## 2021-11-06 PROCEDURE — 94760 N-INVAS EAR/PLS OXIMETRY 1: CPT

## 2021-11-06 PROCEDURE — 80048 BASIC METABOLIC PNL TOTAL CA: CPT | Performed by: PHYSICIAN ASSISTANT

## 2021-11-06 PROCEDURE — 94669 MECHANICAL CHEST WALL OSCILL: CPT

## 2021-11-06 PROCEDURE — 82948 REAGENT STRIP/BLOOD GLUCOSE: CPT

## 2021-11-06 PROCEDURE — 85027 COMPLETE CBC AUTOMATED: CPT | Performed by: PHYSICIAN ASSISTANT

## 2021-11-06 PROCEDURE — 99292 CRITICAL CARE ADDL 30 MIN: CPT | Performed by: STUDENT IN AN ORGANIZED HEALTH CARE EDUCATION/TRAINING PROGRAM

## 2021-11-06 PROCEDURE — 85007 BL SMEAR W/DIFF WBC COUNT: CPT | Performed by: PHYSICIAN ASSISTANT

## 2021-11-06 PROCEDURE — 82330 ASSAY OF CALCIUM: CPT | Performed by: PHYSICIAN ASSISTANT

## 2021-11-06 PROCEDURE — 99291 CRITICAL CARE FIRST HOUR: CPT | Performed by: PHYSICIAN ASSISTANT

## 2021-11-06 PROCEDURE — 82805 BLOOD GASES W/O2 SATURATION: CPT | Performed by: STUDENT IN AN ORGANIZED HEALTH CARE EDUCATION/TRAINING PROGRAM

## 2021-11-06 PROCEDURE — 85730 THROMBOPLASTIN TIME PARTIAL: CPT | Performed by: INTERNAL MEDICINE

## 2021-11-06 PROCEDURE — 84100 ASSAY OF PHOSPHORUS: CPT | Performed by: PHYSICIAN ASSISTANT

## 2021-11-06 PROCEDURE — 94003 VENT MGMT INPAT SUBQ DAY: CPT

## 2021-11-06 PROCEDURE — 99232 SBSQ HOSP IP/OBS MODERATE 35: CPT | Performed by: INTERNAL MEDICINE

## 2021-11-06 PROCEDURE — 83735 ASSAY OF MAGNESIUM: CPT | Performed by: PHYSICIAN ASSISTANT

## 2021-11-06 RX ORDER — POTASSIUM CHLORIDE 14.9 MG/ML
20 INJECTION INTRAVENOUS ONCE
Status: COMPLETED | OUTPATIENT
Start: 2021-11-06 | End: 2021-11-06

## 2021-11-06 RX ORDER — AMOXICILLIN 250 MG
2 CAPSULE ORAL
Status: DISCONTINUED | OUTPATIENT
Start: 2021-11-06 | End: 2021-11-18

## 2021-11-06 RX ORDER — POTASSIUM CHLORIDE 20MEQ/15ML
20 LIQUID (ML) ORAL ONCE
Status: COMPLETED | OUTPATIENT
Start: 2021-11-06 | End: 2021-11-06

## 2021-11-06 RX ORDER — GUAIFENESIN 100 MG/5ML
200 SOLUTION ORAL EVERY 4 HOURS PRN
Status: DISCONTINUED | OUTPATIENT
Start: 2021-11-06 | End: 2021-11-16

## 2021-11-06 RX ORDER — CALCIUM GLUCONATE 20 MG/ML
2 INJECTION, SOLUTION INTRAVENOUS ONCE
Status: COMPLETED | OUTPATIENT
Start: 2021-11-06 | End: 2021-11-06

## 2021-11-06 RX ADMIN — TICAGRELOR 90 MG: 90 TABLET ORAL at 21:45

## 2021-11-06 RX ADMIN — CHLORHEXIDINE GLUCONATE 15 ML: 1.2 SOLUTION ORAL at 20:25

## 2021-11-06 RX ADMIN — FUROSEMIDE 40 MG: 10 INJECTION, SOLUTION INTRAMUSCULAR; INTRAVENOUS at 16:13

## 2021-11-06 RX ADMIN — LINEZOLID 300 MG: 600 INJECTION, SOLUTION INTRAVENOUS at 23:50

## 2021-11-06 RX ADMIN — Medication 12.5 MG: at 21:45

## 2021-11-06 RX ADMIN — LEVALBUTEROL HYDROCHLORIDE 1.25 MG: 1.25 SOLUTION, CONCENTRATE RESPIRATORY (INHALATION) at 08:12

## 2021-11-06 RX ADMIN — LIDOCAINE 5% 1 PATCH: 700 PATCH TOPICAL at 08:18

## 2021-11-06 RX ADMIN — FUROSEMIDE 40 MG: 10 INJECTION, SOLUTION INTRAMUSCULAR; INTRAVENOUS at 08:19

## 2021-11-06 RX ADMIN — AMIODARONE HYDROCHLORIDE 200 MG: 200 TABLET ORAL at 16:13

## 2021-11-06 RX ADMIN — PREGABALIN 75 MG: 75 CAPSULE ORAL at 08:28

## 2021-11-06 RX ADMIN — LINEZOLID 300 MG: 600 INJECTION, SOLUTION INTRAVENOUS at 11:54

## 2021-11-06 RX ADMIN — IPRATROPIUM BROMIDE 0.5 MG: 0.5 SOLUTION RESPIRATORY (INHALATION) at 20:08

## 2021-11-06 RX ADMIN — LEVALBUTEROL HYDROCHLORIDE 1.25 MG: 1.25 SOLUTION, CONCENTRATE RESPIRATORY (INHALATION) at 20:08

## 2021-11-06 RX ADMIN — Medication 20 MG: at 05:27

## 2021-11-06 RX ADMIN — CHLORHEXIDINE GLUCONATE 15 ML: 1.2 SOLUTION ORAL at 08:18

## 2021-11-06 RX ADMIN — IPRATROPIUM BROMIDE 0.5 MG: 0.5 SOLUTION RESPIRATORY (INHALATION) at 08:12

## 2021-11-06 RX ADMIN — SODIUM CHLORIDE 6 UNITS/HR: 9 INJECTION, SOLUTION INTRAVENOUS at 05:03

## 2021-11-06 RX ADMIN — IPRATROPIUM BROMIDE 0.5 MG: 0.5 SOLUTION RESPIRATORY (INHALATION) at 13:14

## 2021-11-06 RX ADMIN — HEPARIN SODIUM 17.1 UNITS/KG/HR: 10000 INJECTION, SOLUTION INTRAVENOUS at 05:04

## 2021-11-06 RX ADMIN — LEVALBUTEROL HYDROCHLORIDE 1.25 MG: 1.25 SOLUTION, CONCENTRATE RESPIRATORY (INHALATION) at 13:14

## 2021-11-06 RX ADMIN — MELATONIN 6 MG: at 21:45

## 2021-11-06 RX ADMIN — SENNOSIDES AND DOCUSATE SODIUM 2 TABLET: 8.6; 5 TABLET ORAL at 21:45

## 2021-11-06 RX ADMIN — ASPIRIN 81 MG CHEWABLE TABLET 81 MG: 81 TABLET CHEWABLE at 08:18

## 2021-11-06 RX ADMIN — TICAGRELOR 90 MG: 90 TABLET ORAL at 08:18

## 2021-11-06 RX ADMIN — ATORVASTATIN CALCIUM 40 MG: 40 TABLET, FILM COATED ORAL at 16:13

## 2021-11-06 RX ADMIN — ONDANSETRON 4 MG: 2 INJECTION INTRAMUSCULAR; INTRAVENOUS at 13:14

## 2021-11-06 RX ADMIN — AMIODARONE HYDROCHLORIDE 200 MG: 200 TABLET ORAL at 07:37

## 2021-11-06 RX ADMIN — LINEZOLID 300 MG: 600 INJECTION, SOLUTION INTRAVENOUS at 01:26

## 2021-11-06 RX ADMIN — SODIUM CHLORIDE 6 UNITS/HR: 9 INJECTION, SOLUTION INTRAVENOUS at 20:14

## 2021-11-07 LAB
ANION GAP SERPL CALCULATED.3IONS-SCNC: 1 MMOL/L (ref 4–13)
APTT PPP: 62 SECONDS (ref 23–37)
BUN SERPL-MCNC: 64 MG/DL (ref 5–25)
CALCIUM SERPL-MCNC: 8.8 MG/DL (ref 8.3–10.1)
CHLORIDE SERPL-SCNC: 106 MMOL/L (ref 100–108)
CO2 SERPL-SCNC: 32 MMOL/L (ref 21–32)
CREAT SERPL-MCNC: 1.3 MG/DL (ref 0.6–1.3)
ERYTHROCYTE [DISTWIDTH] IN BLOOD BY AUTOMATED COUNT: 17.5 % (ref 11.6–15.1)
GFR SERPL CREATININE-BSD FRML MDRD: 46 ML/MIN/1.73SQ M
GLUCOSE SERPL-MCNC: 110 MG/DL (ref 65–140)
GLUCOSE SERPL-MCNC: 116 MG/DL (ref 65–140)
GLUCOSE SERPL-MCNC: 126 MG/DL (ref 65–140)
GLUCOSE SERPL-MCNC: 134 MG/DL (ref 65–140)
GLUCOSE SERPL-MCNC: 141 MG/DL (ref 65–140)
GLUCOSE SERPL-MCNC: 143 MG/DL (ref 65–140)
GLUCOSE SERPL-MCNC: 150 MG/DL (ref 65–140)
GLUCOSE SERPL-MCNC: 158 MG/DL (ref 65–140)
GLUCOSE SERPL-MCNC: 163 MG/DL (ref 65–140)
GLUCOSE SERPL-MCNC: 169 MG/DL (ref 65–140)
GLUCOSE SERPL-MCNC: 180 MG/DL (ref 65–140)
GLUCOSE SERPL-MCNC: 182 MG/DL (ref 65–140)
GLUCOSE SERPL-MCNC: 186 MG/DL (ref 65–140)
GLUCOSE SERPL-MCNC: 194 MG/DL (ref 65–140)
HCT VFR BLD AUTO: 27.3 % (ref 34.8–46.1)
HGB BLD-MCNC: 8.1 G/DL (ref 11.5–15.4)
MAGNESIUM SERPL-MCNC: 2.2 MG/DL (ref 1.6–2.6)
MCH RBC QN AUTO: 26.6 PG (ref 26.8–34.3)
MCHC RBC AUTO-ENTMCNC: 29.7 G/DL (ref 31.4–37.4)
MCV RBC AUTO: 90 FL (ref 82–98)
PHOSPHATE SERPL-MCNC: 3.8 MG/DL (ref 2.7–4.5)
PLATELET # BLD AUTO: 704 THOUSANDS/UL (ref 149–390)
PMV BLD AUTO: 9.9 FL (ref 8.9–12.7)
POTASSIUM SERPL-SCNC: 3.7 MMOL/L (ref 3.5–5.3)
RBC # BLD AUTO: 3.05 MILLION/UL (ref 3.81–5.12)
SODIUM SERPL-SCNC: 139 MMOL/L (ref 136–145)
WBC # BLD AUTO: 18.74 THOUSAND/UL (ref 4.31–10.16)

## 2021-11-07 PROCEDURE — 99292 CRITICAL CARE ADDL 30 MIN: CPT | Performed by: STUDENT IN AN ORGANIZED HEALTH CARE EDUCATION/TRAINING PROGRAM

## 2021-11-07 PROCEDURE — 94760 N-INVAS EAR/PLS OXIMETRY 1: CPT

## 2021-11-07 PROCEDURE — 85730 THROMBOPLASTIN TIME PARTIAL: CPT | Performed by: INTERNAL MEDICINE

## 2021-11-07 PROCEDURE — 83735 ASSAY OF MAGNESIUM: CPT | Performed by: PHYSICIAN ASSISTANT

## 2021-11-07 PROCEDURE — 99291 CRITICAL CARE FIRST HOUR: CPT | Performed by: PHYSICIAN ASSISTANT

## 2021-11-07 PROCEDURE — 84100 ASSAY OF PHOSPHORUS: CPT | Performed by: PHYSICIAN ASSISTANT

## 2021-11-07 PROCEDURE — 80048 BASIC METABOLIC PNL TOTAL CA: CPT | Performed by: PHYSICIAN ASSISTANT

## 2021-11-07 PROCEDURE — 99231 SBSQ HOSP IP/OBS SF/LOW 25: CPT | Performed by: INTERNAL MEDICINE

## 2021-11-07 PROCEDURE — 82948 REAGENT STRIP/BLOOD GLUCOSE: CPT

## 2021-11-07 PROCEDURE — 94640 AIRWAY INHALATION TREATMENT: CPT

## 2021-11-07 PROCEDURE — 94003 VENT MGMT INPAT SUBQ DAY: CPT

## 2021-11-07 PROCEDURE — 94668 MNPJ CHEST WALL SBSQ: CPT

## 2021-11-07 PROCEDURE — 94669 MECHANICAL CHEST WALL OSCILL: CPT

## 2021-11-07 PROCEDURE — 85027 COMPLETE CBC AUTOMATED: CPT | Performed by: PHYSICIAN ASSISTANT

## 2021-11-07 RX ORDER — LOSARTAN POTASSIUM 25 MG/1
25 TABLET ORAL DAILY
Status: DISCONTINUED | OUTPATIENT
Start: 2021-11-08 | End: 2021-11-16

## 2021-11-07 RX ORDER — POTASSIUM CHLORIDE 20MEQ/15ML
40 LIQUID (ML) ORAL ONCE
Status: COMPLETED | OUTPATIENT
Start: 2021-11-07 | End: 2021-11-07

## 2021-11-07 RX ORDER — LOSARTAN POTASSIUM 25 MG/1
25 TABLET ORAL DAILY
Status: DISCONTINUED | OUTPATIENT
Start: 2021-11-07 | End: 2021-11-07

## 2021-11-07 RX ADMIN — LIDOCAINE 5% 1 PATCH: 700 PATCH TOPICAL at 08:27

## 2021-11-07 RX ADMIN — FUROSEMIDE 40 MG: 10 INJECTION, SOLUTION INTRAMUSCULAR; INTRAVENOUS at 08:29

## 2021-11-07 RX ADMIN — AMIODARONE HYDROCHLORIDE 200 MG: 200 TABLET ORAL at 16:29

## 2021-11-07 RX ADMIN — SODIUM CHLORIDE 12 UNITS/HR: 9 INJECTION, SOLUTION INTRAVENOUS at 04:15

## 2021-11-07 RX ADMIN — HEPARIN SODIUM 17.1 UNITS/KG/HR: 10000 INJECTION, SOLUTION INTRAVENOUS at 14:41

## 2021-11-07 RX ADMIN — TICAGRELOR 90 MG: 90 TABLET ORAL at 22:12

## 2021-11-07 RX ADMIN — AMIODARONE HYDROCHLORIDE 200 MG: 200 TABLET ORAL at 08:28

## 2021-11-07 RX ADMIN — METOPROLOL TARTRATE 25 MG: 25 TABLET, FILM COATED ORAL at 22:12

## 2021-11-07 RX ADMIN — Medication 20 MG: at 05:48

## 2021-11-07 RX ADMIN — HEPARIN SODIUM 17.1 UNITS/KG/HR: 10000 INJECTION, SOLUTION INTRAVENOUS at 00:00

## 2021-11-07 RX ADMIN — CHLORHEXIDINE GLUCONATE 15 ML: 1.2 SOLUTION ORAL at 22:12

## 2021-11-07 RX ADMIN — LINEZOLID 300 MG: 600 INJECTION, SOLUTION INTRAVENOUS at 12:05

## 2021-11-07 RX ADMIN — SODIUM CHLORIDE 4 UNITS/HR: 9 INJECTION, SOLUTION INTRAVENOUS at 14:43

## 2021-11-07 RX ADMIN — ATORVASTATIN CALCIUM 40 MG: 40 TABLET, FILM COATED ORAL at 16:29

## 2021-11-07 RX ADMIN — PREGABALIN 75 MG: 75 CAPSULE ORAL at 08:28

## 2021-11-07 RX ADMIN — SENNOSIDES AND DOCUSATE SODIUM 2 TABLET: 8.6; 5 TABLET ORAL at 22:13

## 2021-11-07 RX ADMIN — FUROSEMIDE 40 MG: 10 INJECTION, SOLUTION INTRAMUSCULAR; INTRAVENOUS at 16:29

## 2021-11-07 RX ADMIN — LEVALBUTEROL HYDROCHLORIDE 1.25 MG: 1.25 SOLUTION, CONCENTRATE RESPIRATORY (INHALATION) at 19:57

## 2021-11-07 RX ADMIN — IPRATROPIUM BROMIDE 0.5 MG: 0.5 SOLUTION RESPIRATORY (INHALATION) at 08:16

## 2021-11-07 RX ADMIN — LEVALBUTEROL HYDROCHLORIDE 1.25 MG: 1.25 SOLUTION, CONCENTRATE RESPIRATORY (INHALATION) at 08:16

## 2021-11-07 RX ADMIN — MELATONIN 6 MG: at 22:13

## 2021-11-07 RX ADMIN — Medication 12.5 MG: at 08:28

## 2021-11-07 RX ADMIN — ASPIRIN 81 MG CHEWABLE TABLET 81 MG: 81 TABLET CHEWABLE at 08:28

## 2021-11-07 RX ADMIN — IPRATROPIUM BROMIDE 0.5 MG: 0.5 SOLUTION RESPIRATORY (INHALATION) at 19:58

## 2021-11-07 RX ADMIN — CHLORHEXIDINE GLUCONATE 15 ML: 1.2 SOLUTION ORAL at 08:28

## 2021-11-07 RX ADMIN — POTASSIUM CHLORIDE 40 MEQ: 20 SOLUTION ORAL at 08:28

## 2021-11-07 RX ADMIN — IPRATROPIUM BROMIDE 0.5 MG: 0.5 SOLUTION RESPIRATORY (INHALATION) at 13:57

## 2021-11-07 RX ADMIN — LEVALBUTEROL HYDROCHLORIDE 1.25 MG: 1.25 SOLUTION, CONCENTRATE RESPIRATORY (INHALATION) at 13:57

## 2021-11-07 RX ADMIN — TICAGRELOR 90 MG: 90 TABLET ORAL at 08:28

## 2021-11-08 LAB
ANION GAP SERPL CALCULATED.3IONS-SCNC: 2 MMOL/L (ref 4–13)
APTT PPP: 79 SECONDS (ref 23–37)
BASOPHILS # BLD AUTO: 0.05 THOUSANDS/ΜL (ref 0–0.1)
BASOPHILS NFR BLD AUTO: 0 % (ref 0–1)
BUN SERPL-MCNC: 51 MG/DL (ref 5–25)
CALCIUM SERPL-MCNC: 8.6 MG/DL (ref 8.3–10.1)
CHLORIDE SERPL-SCNC: 109 MMOL/L (ref 100–108)
CK MB SERPL-MCNC: <1 % (ref 0–2.5)
CK MB SERPL-MCNC: <1 NG/ML (ref 0–5)
CK SERPL-CCNC: 348 U/L (ref 26–192)
CO2 SERPL-SCNC: 32 MMOL/L (ref 21–32)
CREAT SERPL-MCNC: 1.02 MG/DL (ref 0.6–1.3)
EOSINOPHIL # BLD AUTO: 0.08 THOUSAND/ΜL (ref 0–0.61)
EOSINOPHIL NFR BLD AUTO: 0 % (ref 0–6)
ERYTHROCYTE [DISTWIDTH] IN BLOOD BY AUTOMATED COUNT: 17.9 % (ref 11.6–15.1)
GFR SERPL CREATININE-BSD FRML MDRD: 62 ML/MIN/1.73SQ M
GLUCOSE SERPL-MCNC: 100 MG/DL (ref 65–140)
GLUCOSE SERPL-MCNC: 104 MG/DL (ref 65–140)
GLUCOSE SERPL-MCNC: 105 MG/DL (ref 65–140)
GLUCOSE SERPL-MCNC: 113 MG/DL (ref 65–140)
GLUCOSE SERPL-MCNC: 119 MG/DL (ref 65–140)
GLUCOSE SERPL-MCNC: 121 MG/DL (ref 65–140)
GLUCOSE SERPL-MCNC: 123 MG/DL (ref 65–140)
GLUCOSE SERPL-MCNC: 123 MG/DL (ref 65–140)
GLUCOSE SERPL-MCNC: 125 MG/DL (ref 65–140)
GLUCOSE SERPL-MCNC: 131 MG/DL (ref 65–140)
GLUCOSE SERPL-MCNC: 131 MG/DL (ref 65–140)
GLUCOSE SERPL-MCNC: 133 MG/DL (ref 65–140)
GLUCOSE SERPL-MCNC: 139 MG/DL (ref 65–140)
HCT VFR BLD AUTO: 26.7 % (ref 34.8–46.1)
HGB BLD-MCNC: 7.8 G/DL (ref 11.5–15.4)
IMM GRANULOCYTES # BLD AUTO: 0.35 THOUSAND/UL (ref 0–0.2)
IMM GRANULOCYTES NFR BLD AUTO: 2 % (ref 0–2)
LIDOCAIN SERPL-MCNC: NORMAL UG/ML (ref 1.5–5)
LYMPHOCYTES # BLD AUTO: 1.32 THOUSANDS/ΜL (ref 0.6–4.47)
LYMPHOCYTES NFR BLD AUTO: 7 % (ref 14–44)
MAGNESIUM SERPL-MCNC: 2.2 MG/DL (ref 1.6–2.6)
MCH RBC QN AUTO: 26.5 PG (ref 26.8–34.3)
MCHC RBC AUTO-ENTMCNC: 29.2 G/DL (ref 31.4–37.4)
MCV RBC AUTO: 91 FL (ref 82–98)
MONOCYTES # BLD AUTO: 0.88 THOUSAND/ΜL (ref 0.17–1.22)
MONOCYTES NFR BLD AUTO: 5 % (ref 4–12)
NEUTROPHILS # BLD AUTO: 15.15 THOUSANDS/ΜL (ref 1.85–7.62)
NEUTS SEG NFR BLD AUTO: 86 % (ref 43–75)
NRBC BLD AUTO-RTO: 0 /100 WBCS
PHOSPHATE SERPL-MCNC: 3.9 MG/DL (ref 2.7–4.5)
PLATELET # BLD AUTO: 707 THOUSANDS/UL (ref 149–390)
PMV BLD AUTO: 9.9 FL (ref 8.9–12.7)
POTASSIUM SERPL-SCNC: 3.8 MMOL/L (ref 3.5–5.3)
RBC # BLD AUTO: 2.94 MILLION/UL (ref 3.81–5.12)
SODIUM SERPL-SCNC: 143 MMOL/L (ref 136–145)
WBC # BLD AUTO: 17.83 THOUSAND/UL (ref 4.31–10.16)

## 2021-11-08 PROCEDURE — 85025 COMPLETE CBC W/AUTO DIFF WBC: CPT | Performed by: PHYSICIAN ASSISTANT

## 2021-11-08 PROCEDURE — 99232 SBSQ HOSP IP/OBS MODERATE 35: CPT | Performed by: FAMILY MEDICINE

## 2021-11-08 PROCEDURE — 83735 ASSAY OF MAGNESIUM: CPT | Performed by: PHYSICIAN ASSISTANT

## 2021-11-08 PROCEDURE — 80048 BASIC METABOLIC PNL TOTAL CA: CPT | Performed by: PHYSICIAN ASSISTANT

## 2021-11-08 PROCEDURE — 82553 CREATINE MB FRACTION: CPT | Performed by: PHYSICIAN ASSISTANT

## 2021-11-08 PROCEDURE — 85730 THROMBOPLASTIN TIME PARTIAL: CPT | Performed by: INTERNAL MEDICINE

## 2021-11-08 PROCEDURE — 94669 MECHANICAL CHEST WALL OSCILL: CPT

## 2021-11-08 PROCEDURE — 99232 SBSQ HOSP IP/OBS MODERATE 35: CPT | Performed by: INTERNAL MEDICINE

## 2021-11-08 PROCEDURE — 82948 REAGENT STRIP/BLOOD GLUCOSE: CPT

## 2021-11-08 PROCEDURE — 97164 PT RE-EVAL EST PLAN CARE: CPT

## 2021-11-08 PROCEDURE — 94760 N-INVAS EAR/PLS OXIMETRY 1: CPT

## 2021-11-08 PROCEDURE — 84100 ASSAY OF PHOSPHORUS: CPT | Performed by: PHYSICIAN ASSISTANT

## 2021-11-08 PROCEDURE — 82550 ASSAY OF CK (CPK): CPT | Performed by: PHYSICIAN ASSISTANT

## 2021-11-08 PROCEDURE — 99223 1ST HOSP IP/OBS HIGH 75: CPT

## 2021-11-08 PROCEDURE — 94668 MNPJ CHEST WALL SBSQ: CPT

## 2021-11-08 PROCEDURE — 94640 AIRWAY INHALATION TREATMENT: CPT

## 2021-11-08 PROCEDURE — 99292 CRITICAL CARE ADDL 30 MIN: CPT | Performed by: ANESTHESIOLOGY

## 2021-11-08 PROCEDURE — 99233 SBSQ HOSP IP/OBS HIGH 50: CPT | Performed by: PSYCHIATRY & NEUROLOGY

## 2021-11-08 PROCEDURE — 97168 OT RE-EVAL EST PLAN CARE: CPT

## 2021-11-08 PROCEDURE — 94664 DEMO&/EVAL PT USE INHALER: CPT

## 2021-11-08 PROCEDURE — 99291 CRITICAL CARE FIRST HOUR: CPT | Performed by: PHYSICIAN ASSISTANT

## 2021-11-08 RX ORDER — LINEZOLID 600 MG/1
600 TABLET, FILM COATED ORAL EVERY 12 HOURS SCHEDULED
Status: COMPLETED | OUTPATIENT
Start: 2021-11-08 | End: 2021-11-13

## 2021-11-08 RX ADMIN — HEPARIN SODIUM 17 UNITS/KG/HR: 10000 INJECTION, SOLUTION INTRAVENOUS at 05:37

## 2021-11-08 RX ADMIN — LINEZOLID 600 MG: 600 TABLET, FILM COATED ORAL at 22:58

## 2021-11-08 RX ADMIN — LINEZOLID 600 MG: 600 TABLET, FILM COATED ORAL at 13:39

## 2021-11-08 RX ADMIN — IPRATROPIUM BROMIDE 0.5 MG: 0.5 SOLUTION RESPIRATORY (INHALATION) at 08:03

## 2021-11-08 RX ADMIN — FUROSEMIDE 40 MG: 10 INJECTION, SOLUTION INTRAMUSCULAR; INTRAVENOUS at 16:06

## 2021-11-08 RX ADMIN — PREGABALIN 75 MG: 75 CAPSULE ORAL at 08:27

## 2021-11-08 RX ADMIN — ATORVASTATIN CALCIUM 40 MG: 40 TABLET, FILM COATED ORAL at 16:06

## 2021-11-08 RX ADMIN — LOSARTAN POTASSIUM 25 MG: 25 TABLET, FILM COATED ORAL at 09:49

## 2021-11-08 RX ADMIN — AMIODARONE HYDROCHLORIDE 200 MG: 200 TABLET ORAL at 07:41

## 2021-11-08 RX ADMIN — TICAGRELOR 90 MG: 90 TABLET ORAL at 08:27

## 2021-11-08 RX ADMIN — LINEZOLID 300 MG: 600 INJECTION, SOLUTION INTRAVENOUS at 00:21

## 2021-11-08 RX ADMIN — IPRATROPIUM BROMIDE 0.5 MG: 0.5 SOLUTION RESPIRATORY (INHALATION) at 13:14

## 2021-11-08 RX ADMIN — TICAGRELOR 90 MG: 90 TABLET ORAL at 21:37

## 2021-11-08 RX ADMIN — SODIUM CHLORIDE 2 UNITS/HR: 9 INJECTION, SOLUTION INTRAVENOUS at 21:40

## 2021-11-08 RX ADMIN — Medication 20 MG: at 05:38

## 2021-11-08 RX ADMIN — METOPROLOL TARTRATE 25 MG: 25 TABLET, FILM COATED ORAL at 21:37

## 2021-11-08 RX ADMIN — CHLORHEXIDINE GLUCONATE 15 ML: 1.2 SOLUTION ORAL at 21:37

## 2021-11-08 RX ADMIN — AMIODARONE HYDROCHLORIDE 200 MG: 200 TABLET ORAL at 16:06

## 2021-11-08 RX ADMIN — IPRATROPIUM BROMIDE 0.5 MG: 0.5 SOLUTION RESPIRATORY (INHALATION) at 20:40

## 2021-11-08 RX ADMIN — ASPIRIN 81 MG CHEWABLE TABLET 81 MG: 81 TABLET CHEWABLE at 08:26

## 2021-11-08 RX ADMIN — HEPARIN SODIUM 17.1 UNITS/KG/HR: 10000 INJECTION, SOLUTION INTRAVENOUS at 23:13

## 2021-11-08 RX ADMIN — SENNOSIDES AND DOCUSATE SODIUM 2 TABLET: 8.6; 5 TABLET ORAL at 21:37

## 2021-11-08 RX ADMIN — LIDOCAINE 5% 1 PATCH: 700 PATCH TOPICAL at 08:26

## 2021-11-08 RX ADMIN — LEVALBUTEROL HYDROCHLORIDE 1.25 MG: 1.25 SOLUTION, CONCENTRATE RESPIRATORY (INHALATION) at 13:14

## 2021-11-08 RX ADMIN — LEVALBUTEROL HYDROCHLORIDE 1.25 MG: 1.25 SOLUTION, CONCENTRATE RESPIRATORY (INHALATION) at 20:40

## 2021-11-08 RX ADMIN — CHLORHEXIDINE GLUCONATE 15 ML: 1.2 SOLUTION ORAL at 08:26

## 2021-11-08 RX ADMIN — FUROSEMIDE 40 MG: 10 INJECTION, SOLUTION INTRAMUSCULAR; INTRAVENOUS at 07:41

## 2021-11-08 RX ADMIN — LEVALBUTEROL HYDROCHLORIDE 1.25 MG: 1.25 SOLUTION, CONCENTRATE RESPIRATORY (INHALATION) at 08:03

## 2021-11-08 RX ADMIN — MELATONIN 6 MG: at 21:37

## 2021-11-08 RX ADMIN — METOPROLOL TARTRATE 25 MG: 25 TABLET, FILM COATED ORAL at 08:27

## 2021-11-09 PROBLEM — J15.212 MRSA PNEUMONIA (HCC): Status: RESOLVED | Noted: 2021-11-03 | Resolved: 2021-11-09

## 2021-11-09 PROBLEM — A41.9 SEPSIS (HCC): Status: RESOLVED | Noted: 2021-10-31 | Resolved: 2021-11-09

## 2021-11-09 LAB
ALBUMIN SERPL BCP-MCNC: 1.7 G/DL (ref 3.5–5)
ALP SERPL-CCNC: 101 U/L (ref 46–116)
ALT SERPL W P-5'-P-CCNC: 20 U/L (ref 12–78)
ANION GAP SERPL CALCULATED.3IONS-SCNC: 6 MMOL/L (ref 4–13)
APTT PPP: 71 SECONDS (ref 23–37)
AST SERPL W P-5'-P-CCNC: 25 U/L (ref 5–45)
BASOPHILS # BLD AUTO: 0.04 THOUSANDS/ΜL (ref 0–0.1)
BASOPHILS NFR BLD AUTO: 0 % (ref 0–1)
BILIRUB SERPL-MCNC: 0.57 MG/DL (ref 0.2–1)
BUN SERPL-MCNC: 49 MG/DL (ref 5–25)
C-ANCA TITR SER IF: NORMAL TITER
CA-I BLD-SCNC: 1.1 MMOL/L (ref 1.12–1.32)
CALCIUM ALBUM COR SERPL-MCNC: 10.5 MG/DL (ref 8.3–10.1)
CALCIUM SERPL-MCNC: 8.7 MG/DL (ref 8.3–10.1)
CHLORIDE SERPL-SCNC: 109 MMOL/L (ref 100–108)
CO2 SERPL-SCNC: 32 MMOL/L (ref 21–32)
CREAT SERPL-MCNC: 1.04 MG/DL (ref 0.6–1.3)
EOSINOPHIL # BLD AUTO: 0.05 THOUSAND/ΜL (ref 0–0.61)
EOSINOPHIL NFR BLD AUTO: 0 % (ref 0–6)
ERYTHROCYTE [DISTWIDTH] IN BLOOD BY AUTOMATED COUNT: 17.9 % (ref 11.6–15.1)
GFR SERPL CREATININE-BSD FRML MDRD: 61 ML/MIN/1.73SQ M
GLUCOSE SERPL-MCNC: 104 MG/DL (ref 65–140)
GLUCOSE SERPL-MCNC: 110 MG/DL (ref 65–140)
GLUCOSE SERPL-MCNC: 114 MG/DL (ref 65–140)
GLUCOSE SERPL-MCNC: 114 MG/DL (ref 65–140)
GLUCOSE SERPL-MCNC: 122 MG/DL (ref 65–140)
GLUCOSE SERPL-MCNC: 129 MG/DL (ref 65–140)
GLUCOSE SERPL-MCNC: 132 MG/DL (ref 65–140)
GLUCOSE SERPL-MCNC: 132 MG/DL (ref 65–140)
GLUCOSE SERPL-MCNC: 136 MG/DL (ref 65–140)
GLUCOSE SERPL-MCNC: 139 MG/DL (ref 65–140)
GLUCOSE SERPL-MCNC: 139 MG/DL (ref 65–140)
GLUCOSE SERPL-MCNC: 146 MG/DL (ref 65–140)
HCT VFR BLD AUTO: 29.2 % (ref 34.8–46.1)
HGB BLD-MCNC: 8.4 G/DL (ref 11.5–15.4)
IMM GRANULOCYTES # BLD AUTO: 0.22 THOUSAND/UL (ref 0–0.2)
IMM GRANULOCYTES NFR BLD AUTO: 2 % (ref 0–2)
LYMPHOCYTES # BLD AUTO: 1.27 THOUSANDS/ΜL (ref 0.6–4.47)
LYMPHOCYTES NFR BLD AUTO: 9 % (ref 14–44)
MAGNESIUM SERPL-MCNC: 2.4 MG/DL (ref 1.6–2.6)
MCH RBC QN AUTO: 26.3 PG (ref 26.8–34.3)
MCHC RBC AUTO-ENTMCNC: 28.8 G/DL (ref 31.4–37.4)
MCV RBC AUTO: 92 FL (ref 82–98)
MONOCYTES # BLD AUTO: 0.84 THOUSAND/ΜL (ref 0.17–1.22)
MONOCYTES NFR BLD AUTO: 6 % (ref 4–12)
MYELOPEROXIDASE AB SER IA-ACNC: <9 U/ML (ref 0–9)
NEUTROPHILS # BLD AUTO: 12.49 THOUSANDS/ΜL (ref 1.85–7.62)
NEUTS SEG NFR BLD AUTO: 83 % (ref 43–75)
NRBC BLD AUTO-RTO: 0 /100 WBCS
P-ANCA ATYPICAL TITR SER IF: NORMAL TITER
P-ANCA TITR SER IF: NORMAL TITER
PLATELET # BLD AUTO: 727 THOUSANDS/UL (ref 149–390)
PMV BLD AUTO: 9.7 FL (ref 8.9–12.7)
POTASSIUM SERPL-SCNC: 3.4 MMOL/L (ref 3.5–5.3)
PROCALCITONIN SERPL-MCNC: 0.4 NG/ML
PROCALCITONIN SERPL-MCNC: 0.43 NG/ML
PROT SERPL-MCNC: 7.3 G/DL (ref 6.4–8.2)
PROTEINASE3 AB SER IA-ACNC: <3.5 U/ML (ref 0–3.5)
RBC # BLD AUTO: 3.19 MILLION/UL (ref 3.81–5.12)
SODIUM SERPL-SCNC: 147 MMOL/L (ref 136–145)
WBC # BLD AUTO: 14.91 THOUSAND/UL (ref 4.31–10.16)

## 2021-11-09 PROCEDURE — 82948 REAGENT STRIP/BLOOD GLUCOSE: CPT

## 2021-11-09 PROCEDURE — 85730 THROMBOPLASTIN TIME PARTIAL: CPT | Performed by: INTERNAL MEDICINE

## 2021-11-09 PROCEDURE — 94640 AIRWAY INHALATION TREATMENT: CPT

## 2021-11-09 PROCEDURE — 99291 CRITICAL CARE FIRST HOUR: CPT | Performed by: ANESTHESIOLOGY

## 2021-11-09 PROCEDURE — 99232 SBSQ HOSP IP/OBS MODERATE 35: CPT | Performed by: INTERNAL MEDICINE

## 2021-11-09 PROCEDURE — 84145 PROCALCITONIN (PCT): CPT | Performed by: PHYSICIAN ASSISTANT

## 2021-11-09 PROCEDURE — 83735 ASSAY OF MAGNESIUM: CPT | Performed by: PHYSICIAN ASSISTANT

## 2021-11-09 PROCEDURE — 82330 ASSAY OF CALCIUM: CPT | Performed by: PHYSICIAN ASSISTANT

## 2021-11-09 PROCEDURE — 85025 COMPLETE CBC W/AUTO DIFF WBC: CPT | Performed by: PHYSICIAN ASSISTANT

## 2021-11-09 PROCEDURE — 94669 MECHANICAL CHEST WALL OSCILL: CPT

## 2021-11-09 PROCEDURE — 99233 SBSQ HOSP IP/OBS HIGH 50: CPT | Performed by: PSYCHIATRY & NEUROLOGY

## 2021-11-09 PROCEDURE — 80053 COMPREHEN METABOLIC PANEL: CPT | Performed by: PHYSICIAN ASSISTANT

## 2021-11-09 PROCEDURE — 94760 N-INVAS EAR/PLS OXIMETRY 1: CPT

## 2021-11-09 RX ORDER — SODIUM CHLORIDE 450 MG/100ML
50 INJECTION, SOLUTION INTRAVENOUS CONTINUOUS
Status: DISCONTINUED | OUTPATIENT
Start: 2021-11-09 | End: 2021-11-14

## 2021-11-09 RX ORDER — POTASSIUM CHLORIDE 20MEQ/15ML
20 LIQUID (ML) ORAL ONCE
Status: COMPLETED | OUTPATIENT
Start: 2021-11-09 | End: 2021-11-09

## 2021-11-09 RX ORDER — POTASSIUM CHLORIDE 14.9 MG/ML
20 INJECTION INTRAVENOUS
Status: COMPLETED | OUTPATIENT
Start: 2021-11-09 | End: 2021-11-09

## 2021-11-09 RX ADMIN — LEVALBUTEROL HYDROCHLORIDE 1.25 MG: 1.25 SOLUTION, CONCENTRATE RESPIRATORY (INHALATION) at 07:53

## 2021-11-09 RX ADMIN — MELATONIN 6 MG: at 21:02

## 2021-11-09 RX ADMIN — ATORVASTATIN CALCIUM 40 MG: 40 TABLET, FILM COATED ORAL at 16:00

## 2021-11-09 RX ADMIN — METOPROLOL TARTRATE 25 MG: 25 TABLET, FILM COATED ORAL at 08:30

## 2021-11-09 RX ADMIN — Medication 20 MG: at 05:24

## 2021-11-09 RX ADMIN — FUROSEMIDE 40 MG: 10 INJECTION, SOLUTION INTRAMUSCULAR; INTRAVENOUS at 16:00

## 2021-11-09 RX ADMIN — HEPARIN SODIUM 17.1 UNITS/KG/HR: 10000 INJECTION, SOLUTION INTRAVENOUS at 16:00

## 2021-11-09 RX ADMIN — POTASSIUM CHLORIDE 20 MEQ: 14.9 INJECTION, SOLUTION INTRAVENOUS at 07:40

## 2021-11-09 RX ADMIN — TICAGRELOR 90 MG: 90 TABLET ORAL at 08:30

## 2021-11-09 RX ADMIN — AMIODARONE HYDROCHLORIDE 200 MG: 200 TABLET ORAL at 08:30

## 2021-11-09 RX ADMIN — ASPIRIN 81 MG CHEWABLE TABLET 81 MG: 81 TABLET CHEWABLE at 08:30

## 2021-11-09 RX ADMIN — LIDOCAINE 5% 1 PATCH: 700 PATCH TOPICAL at 08:50

## 2021-11-09 RX ADMIN — POTASSIUM CHLORIDE 20 MEQ: 14.9 INJECTION, SOLUTION INTRAVENOUS at 05:24

## 2021-11-09 RX ADMIN — POTASSIUM CHLORIDE 20 MEQ: 20 SOLUTION ORAL at 05:24

## 2021-11-09 RX ADMIN — CHLORHEXIDINE GLUCONATE 15 ML: 1.2 SOLUTION ORAL at 20:09

## 2021-11-09 RX ADMIN — IPRATROPIUM BROMIDE 0.5 MG: 0.5 SOLUTION RESPIRATORY (INHALATION) at 14:02

## 2021-11-09 RX ADMIN — LEVALBUTEROL HYDROCHLORIDE 1.25 MG: 1.25 SOLUTION, CONCENTRATE RESPIRATORY (INHALATION) at 14:02

## 2021-11-09 RX ADMIN — CHLORHEXIDINE GLUCONATE 15 ML: 1.2 SOLUTION ORAL at 08:30

## 2021-11-09 RX ADMIN — FUROSEMIDE 40 MG: 10 INJECTION, SOLUTION INTRAMUSCULAR; INTRAVENOUS at 07:42

## 2021-11-09 RX ADMIN — LINEZOLID 600 MG: 600 TABLET, FILM COATED ORAL at 11:40

## 2021-11-09 RX ADMIN — LEVALBUTEROL HYDROCHLORIDE 1.25 MG: 1.25 SOLUTION, CONCENTRATE RESPIRATORY (INHALATION) at 19:35

## 2021-11-09 RX ADMIN — METOPROLOL TARTRATE 25 MG: 25 TABLET, FILM COATED ORAL at 20:09

## 2021-11-09 RX ADMIN — SODIUM CHLORIDE 50 ML/HR: 0.45 INJECTION, SOLUTION INTRAVENOUS at 16:00

## 2021-11-09 RX ADMIN — IPRATROPIUM BROMIDE 0.5 MG: 0.5 SOLUTION RESPIRATORY (INHALATION) at 19:35

## 2021-11-09 RX ADMIN — PREGABALIN 75 MG: 75 CAPSULE ORAL at 08:43

## 2021-11-09 RX ADMIN — AMIODARONE HYDROCHLORIDE 200 MG: 200 TABLET ORAL at 16:00

## 2021-11-09 RX ADMIN — IPRATROPIUM BROMIDE 0.5 MG: 0.5 SOLUTION RESPIRATORY (INHALATION) at 07:54

## 2021-11-09 RX ADMIN — TICAGRELOR 90 MG: 90 TABLET ORAL at 20:10

## 2021-11-09 RX ADMIN — LINEZOLID 600 MG: 600 TABLET, FILM COATED ORAL at 20:59

## 2021-11-09 RX ADMIN — LOSARTAN POTASSIUM 25 MG: 25 TABLET, FILM COATED ORAL at 08:30

## 2021-11-09 RX ADMIN — POLYETHYLENE GLYCOL 3350 17 G: 17 POWDER, FOR SOLUTION ORAL at 16:55

## 2021-11-09 RX ADMIN — SENNOSIDES AND DOCUSATE SODIUM 2 TABLET: 8.6; 5 TABLET ORAL at 21:26

## 2021-11-10 ENCOUNTER — APPOINTMENT (INPATIENT)
Dept: NEUROLOGY | Facility: CLINIC | Age: 56
DRG: 003 | End: 2021-11-10
Payer: MEDICARE

## 2021-11-10 ENCOUNTER — TELEPHONE (OUTPATIENT)
Dept: GASTROENTEROLOGY | Facility: CLINIC | Age: 56
End: 2021-11-10

## 2021-11-10 ENCOUNTER — APPOINTMENT (INPATIENT)
Dept: RADIOLOGY | Facility: HOSPITAL | Age: 56
DRG: 003 | End: 2021-11-10
Payer: MEDICARE

## 2021-11-10 PROBLEM — I21.3 STEMI (ST ELEVATION MYOCARDIAL INFARCTION) (HCC): Status: RESOLVED | Noted: 2021-10-23 | Resolved: 2021-11-10

## 2021-11-10 PROBLEM — N17.9 AKI (ACUTE KIDNEY INJURY) (HCC): Status: RESOLVED | Noted: 2021-10-24 | Resolved: 2021-11-10

## 2021-11-10 PROBLEM — R57.0 CARDIOGENIC SHOCK (HCC): Status: RESOLVED | Noted: 2021-10-23 | Resolved: 2021-11-10

## 2021-11-10 LAB
APTT PPP: 127 SECONDS (ref 23–37)
GLUCOSE SERPL-MCNC: 119 MG/DL (ref 65–140)
GLUCOSE SERPL-MCNC: 122 MG/DL (ref 65–140)
GLUCOSE SERPL-MCNC: 125 MG/DL (ref 65–140)
GLUCOSE SERPL-MCNC: 128 MG/DL (ref 65–140)
GLUCOSE SERPL-MCNC: 130 MG/DL (ref 65–140)
GLUCOSE SERPL-MCNC: 131 MG/DL (ref 65–140)
GLUCOSE SERPL-MCNC: 132 MG/DL (ref 65–140)
GLUCOSE SERPL-MCNC: 132 MG/DL (ref 65–140)
GLUCOSE SERPL-MCNC: 133 MG/DL (ref 65–140)
GLUCOSE SERPL-MCNC: 133 MG/DL (ref 65–140)
GLUCOSE SERPL-MCNC: 139 MG/DL (ref 65–140)
GLUCOSE SERPL-MCNC: 140 MG/DL (ref 65–140)
GLUCOSE SERPL-MCNC: 154 MG/DL (ref 65–140)
GLUCOSE SERPL-MCNC: 156 MG/DL (ref 65–140)

## 2021-11-10 PROCEDURE — 99222 1ST HOSP IP/OBS MODERATE 55: CPT | Performed by: SURGERY

## 2021-11-10 PROCEDURE — 85730 THROMBOPLASTIN TIME PARTIAL: CPT | Performed by: PHYSICIAN ASSISTANT

## 2021-11-10 PROCEDURE — 71045 X-RAY EXAM CHEST 1 VIEW: CPT

## 2021-11-10 PROCEDURE — 95816 EEG AWAKE AND DROWSY: CPT | Performed by: PSYCHIATRY & NEUROLOGY

## 2021-11-10 PROCEDURE — 31500 INSERT EMERGENCY AIRWAY: CPT | Performed by: ANESTHESIOLOGY

## 2021-11-10 PROCEDURE — 95816 EEG AWAKE AND DROWSY: CPT

## 2021-11-10 PROCEDURE — 82948 REAGENT STRIP/BLOOD GLUCOSE: CPT

## 2021-11-10 PROCEDURE — 99291 CRITICAL CARE FIRST HOUR: CPT | Performed by: ANESTHESIOLOGY

## 2021-11-10 PROCEDURE — 99232 SBSQ HOSP IP/OBS MODERATE 35: CPT | Performed by: INTERNAL MEDICINE

## 2021-11-10 PROCEDURE — 94640 AIRWAY INHALATION TREATMENT: CPT

## 2021-11-10 PROCEDURE — 99233 SBSQ HOSP IP/OBS HIGH 50: CPT | Performed by: PSYCHIATRY & NEUROLOGY

## 2021-11-10 PROCEDURE — 94760 N-INVAS EAR/PLS OXIMETRY 1: CPT

## 2021-11-10 PROCEDURE — 99233 SBSQ HOSP IP/OBS HIGH 50: CPT | Performed by: INTERNAL MEDICINE

## 2021-11-10 PROCEDURE — 94669 MECHANICAL CHEST WALL OSCILL: CPT

## 2021-11-10 PROCEDURE — 94002 VENT MGMT INPAT INIT DAY: CPT

## 2021-11-10 PROCEDURE — NC001 PR NO CHARGE: Performed by: ANESTHESIOLOGY

## 2021-11-10 RX ORDER — NOREPINEPHRINE BITARTRATE 1 MG/ML
INJECTION, SOLUTION INTRAVENOUS
Status: COMPLETED
Start: 2021-11-10 | End: 2021-11-10

## 2021-11-10 RX ORDER — ETOMIDATE 2 MG/ML
20 INJECTION INTRAVENOUS ONCE
Status: COMPLETED | OUTPATIENT
Start: 2021-11-10 | End: 2021-11-10

## 2021-11-10 RX ORDER — FENTANYL CITRATE 50 UG/ML
50 INJECTION, SOLUTION INTRAMUSCULAR; INTRAVENOUS
Status: DISCONTINUED | OUTPATIENT
Start: 2021-11-10 | End: 2021-11-16

## 2021-11-10 RX ORDER — SUCCINYLCHOLINE/SOD CL,ISO/PF 100 MG/5ML
100 SYRINGE (ML) INTRAVENOUS ONCE
Status: COMPLETED | OUTPATIENT
Start: 2021-11-10 | End: 2021-11-10

## 2021-11-10 RX ORDER — PROPOFOL 10 MG/ML
5-50 INJECTION, EMULSION INTRAVENOUS
Status: DISCONTINUED | OUTPATIENT
Start: 2021-11-10 | End: 2021-11-13

## 2021-11-10 RX ORDER — CHLORHEXIDINE GLUCONATE 0.12 MG/ML
15 RINSE ORAL EVERY 12 HOURS SCHEDULED
Status: DISCONTINUED | OUTPATIENT
Start: 2021-11-10 | End: 2021-11-23 | Stop reason: HOSPADM

## 2021-11-10 RX ADMIN — AMIODARONE HYDROCHLORIDE 200 MG: 200 TABLET ORAL at 07:38

## 2021-11-10 RX ADMIN — PROPOFOL 20 MCG/KG/MIN: 10 INJECTION, EMULSION INTRAVENOUS at 15:06

## 2021-11-10 RX ADMIN — ETOMIDATE 20 MG: 20 INJECTION, SOLUTION INTRAVENOUS at 10:58

## 2021-11-10 RX ADMIN — SODIUM CHLORIDE 3 UNITS/HR: 9 INJECTION, SOLUTION INTRAVENOUS at 15:05

## 2021-11-10 RX ADMIN — LEVALBUTEROL HYDROCHLORIDE 1.25 MG: 1.25 SOLUTION, CONCENTRATE RESPIRATORY (INHALATION) at 19:39

## 2021-11-10 RX ADMIN — ALBUTEROL SULFATE 2.5 MG: 2.5 SOLUTION RESPIRATORY (INHALATION) at 11:43

## 2021-11-10 RX ADMIN — FUROSEMIDE 40 MG: 10 INJECTION, SOLUTION INTRAMUSCULAR; INTRAVENOUS at 07:38

## 2021-11-10 RX ADMIN — CHLORHEXIDINE GLUCONATE 15 ML: 1.2 SOLUTION ORAL at 20:46

## 2021-11-10 RX ADMIN — TICAGRELOR 90 MG: 90 TABLET ORAL at 08:54

## 2021-11-10 RX ADMIN — LIDOCAINE 5% 1 PATCH: 700 PATCH TOPICAL at 08:53

## 2021-11-10 RX ADMIN — Medication 20 MG: at 05:56

## 2021-11-10 RX ADMIN — LOSARTAN POTASSIUM 25 MG: 25 TABLET, FILM COATED ORAL at 08:53

## 2021-11-10 RX ADMIN — Medication 100 MG: at 10:58

## 2021-11-10 RX ADMIN — IPRATROPIUM BROMIDE 0.5 MG: 0.5 SOLUTION RESPIRATORY (INHALATION) at 19:39

## 2021-11-10 RX ADMIN — METOPROLOL TARTRATE 25 MG: 25 TABLET, FILM COATED ORAL at 08:53

## 2021-11-10 RX ADMIN — ATORVASTATIN CALCIUM 40 MG: 40 TABLET, FILM COATED ORAL at 16:07

## 2021-11-10 RX ADMIN — LINEZOLID 600 MG: 600 TABLET, FILM COATED ORAL at 08:53

## 2021-11-10 RX ADMIN — FENTANYL CITRATE 50 MCG: 50 INJECTION INTRAMUSCULAR; INTRAVENOUS at 11:25

## 2021-11-10 RX ADMIN — PROPOFOL 30 MCG/KG/MIN: 10 INJECTION, EMULSION INTRAVENOUS at 10:58

## 2021-11-10 RX ADMIN — CHLORHEXIDINE GLUCONATE 15 ML: 1.2 SOLUTION ORAL at 08:53

## 2021-11-10 RX ADMIN — MELATONIN 6 MG: at 21:47

## 2021-11-10 RX ADMIN — SENNOSIDES AND DOCUSATE SODIUM 2 TABLET: 8.6; 5 TABLET ORAL at 21:47

## 2021-11-10 RX ADMIN — HEPARIN SODIUM 14.1 UNITS/KG/HR: 10000 INJECTION, SOLUTION INTRAVENOUS at 07:49

## 2021-11-10 RX ADMIN — LINEZOLID 600 MG: 600 TABLET, FILM COATED ORAL at 20:45

## 2021-11-10 RX ADMIN — IPRATROPIUM BROMIDE 0.5 MG: 0.5 SOLUTION RESPIRATORY (INHALATION) at 13:47

## 2021-11-10 RX ADMIN — FUROSEMIDE 40 MG: 10 INJECTION, SOLUTION INTRAMUSCULAR; INTRAVENOUS at 16:07

## 2021-11-10 RX ADMIN — LEVALBUTEROL HYDROCHLORIDE 1.25 MG: 1.25 SOLUTION, CONCENTRATE RESPIRATORY (INHALATION) at 13:47

## 2021-11-10 RX ADMIN — PREGABALIN 75 MG: 75 CAPSULE ORAL at 08:53

## 2021-11-10 RX ADMIN — TICAGRELOR 90 MG: 90 TABLET ORAL at 20:45

## 2021-11-10 RX ADMIN — ASPIRIN 81 MG CHEWABLE TABLET 81 MG: 81 TABLET CHEWABLE at 08:52

## 2021-11-10 RX ADMIN — AMIODARONE HYDROCHLORIDE 200 MG: 200 TABLET ORAL at 16:07

## 2021-11-10 RX ADMIN — NOREPINEPHRINE BITARTRATE: 1 INJECTION, SOLUTION, CONCENTRATE INTRAVENOUS at 19:51

## 2021-11-10 RX ADMIN — CHLORHEXIDINE GLUCONATE 15 ML: 1.2 SOLUTION ORAL at 10:58

## 2021-11-11 ENCOUNTER — ANESTHESIA EVENT (INPATIENT)
Dept: PERIOP | Facility: HOSPITAL | Age: 56
DRG: 003 | End: 2021-11-11
Payer: MEDICARE

## 2021-11-11 PROBLEM — R33.9 URINARY RETENTION: Status: ACTIVE | Noted: 2021-11-11

## 2021-11-11 LAB
ABO GROUP BLD: NORMAL
ANION GAP SERPL CALCULATED.3IONS-SCNC: 6 MMOL/L (ref 4–13)
APTT PPP: 30 SECONDS (ref 23–37)
APTT PPP: 40 SECONDS (ref 23–37)
APTT PPP: 75 SECONDS (ref 23–37)
APTT PPP: 80 SECONDS (ref 23–37)
BASOPHILS # BLD AUTO: 0.02 THOUSANDS/ΜL (ref 0–0.1)
BASOPHILS NFR BLD AUTO: 0 % (ref 0–1)
BLD GP AB SCN SERPL QL: NEGATIVE
BUN SERPL-MCNC: 55 MG/DL (ref 5–25)
CALCIUM SERPL-MCNC: 8 MG/DL (ref 8.3–10.1)
CHLORIDE SERPL-SCNC: 113 MMOL/L (ref 100–108)
CO2 SERPL-SCNC: 29 MMOL/L (ref 21–32)
CREAT SERPL-MCNC: 1.29 MG/DL (ref 0.6–1.3)
EOSINOPHIL # BLD AUTO: 0.02 THOUSAND/ΜL (ref 0–0.61)
EOSINOPHIL NFR BLD AUTO: 0 % (ref 0–6)
ERYTHROCYTE [DISTWIDTH] IN BLOOD BY AUTOMATED COUNT: 18.4 % (ref 11.6–15.1)
ERYTHROCYTE [DISTWIDTH] IN BLOOD BY AUTOMATED COUNT: 18.4 % (ref 11.6–15.1)
GFR SERPL CREATININE-BSD FRML MDRD: 47 ML/MIN/1.73SQ M
GLUCOSE SERPL-MCNC: 124 MG/DL (ref 65–140)
GLUCOSE SERPL-MCNC: 126 MG/DL (ref 65–140)
GLUCOSE SERPL-MCNC: 129 MG/DL (ref 65–140)
GLUCOSE SERPL-MCNC: 129 MG/DL (ref 65–140)
GLUCOSE SERPL-MCNC: 130 MG/DL (ref 65–140)
GLUCOSE SERPL-MCNC: 135 MG/DL (ref 65–140)
GLUCOSE SERPL-MCNC: 136 MG/DL (ref 65–140)
GLUCOSE SERPL-MCNC: 140 MG/DL (ref 65–140)
GLUCOSE SERPL-MCNC: 143 MG/DL (ref 65–140)
GLUCOSE SERPL-MCNC: 151 MG/DL (ref 65–140)
GLUCOSE SERPL-MCNC: 152 MG/DL (ref 65–140)
GLUCOSE SERPL-MCNC: 154 MG/DL (ref 65–140)
HCT VFR BLD AUTO: 25.5 % (ref 34.8–46.1)
HCT VFR BLD AUTO: 26.9 % (ref 34.8–46.1)
HGB BLD-MCNC: 7.5 G/DL (ref 11.5–15.4)
HGB BLD-MCNC: 7.7 G/DL (ref 11.5–15.4)
IMM GRANULOCYTES # BLD AUTO: 0.09 THOUSAND/UL (ref 0–0.2)
IMM GRANULOCYTES NFR BLD AUTO: 1 % (ref 0–2)
INR PPP: 1.36 (ref 0.84–1.19)
LYMPHOCYTES # BLD AUTO: 0.64 THOUSANDS/ΜL (ref 0.6–4.47)
LYMPHOCYTES NFR BLD AUTO: 6 % (ref 14–44)
MCH RBC QN AUTO: 26.3 PG (ref 26.8–34.3)
MCH RBC QN AUTO: 27 PG (ref 26.8–34.3)
MCHC RBC AUTO-ENTMCNC: 28.6 G/DL (ref 31.4–37.4)
MCHC RBC AUTO-ENTMCNC: 29.4 G/DL (ref 31.4–37.4)
MCV RBC AUTO: 92 FL (ref 82–98)
MCV RBC AUTO: 92 FL (ref 82–98)
MONOCYTES # BLD AUTO: 0.69 THOUSAND/ΜL (ref 0.17–1.22)
MONOCYTES NFR BLD AUTO: 7 % (ref 4–12)
NEUTROPHILS # BLD AUTO: 9.1 THOUSANDS/ΜL (ref 1.85–7.62)
NEUTS SEG NFR BLD AUTO: 86 % (ref 43–75)
NRBC BLD AUTO-RTO: 0 /100 WBCS
PLATELET # BLD AUTO: 553 THOUSANDS/UL (ref 149–390)
PLATELET # BLD AUTO: 577 THOUSANDS/UL (ref 149–390)
PMV BLD AUTO: 10 FL (ref 8.9–12.7)
PMV BLD AUTO: 10.3 FL (ref 8.9–12.7)
POTASSIUM SERPL-SCNC: 3.2 MMOL/L (ref 3.5–5.3)
PROCALCITONIN SERPL-MCNC: 0.78 NG/ML
PROTHROMBIN TIME: 16.2 SECONDS (ref 11.6–14.5)
RBC # BLD AUTO: 2.78 MILLION/UL (ref 3.81–5.12)
RBC # BLD AUTO: 2.93 MILLION/UL (ref 3.81–5.12)
RH BLD: POSITIVE
SODIUM SERPL-SCNC: 148 MMOL/L (ref 136–145)
SPECIMEN EXPIRATION DATE: NORMAL
WBC # BLD AUTO: 10.56 THOUSAND/UL (ref 4.31–10.16)
WBC # BLD AUTO: 11.42 THOUSAND/UL (ref 4.31–10.16)

## 2021-11-11 PROCEDURE — 99291 CRITICAL CARE FIRST HOUR: CPT | Performed by: PHYSICIAN ASSISTANT

## 2021-11-11 PROCEDURE — 82948 REAGENT STRIP/BLOOD GLUCOSE: CPT

## 2021-11-11 PROCEDURE — 99232 SBSQ HOSP IP/OBS MODERATE 35: CPT | Performed by: SURGERY

## 2021-11-11 PROCEDURE — 85025 COMPLETE CBC W/AUTO DIFF WBC: CPT | Performed by: INTERNAL MEDICINE

## 2021-11-11 PROCEDURE — 86850 RBC ANTIBODY SCREEN: CPT | Performed by: SURGERY

## 2021-11-11 PROCEDURE — 86901 BLOOD TYPING SEROLOGIC RH(D): CPT | Performed by: SURGERY

## 2021-11-11 PROCEDURE — 99292 CRITICAL CARE ADDL 30 MIN: CPT | Performed by: ANESTHESIOLOGY

## 2021-11-11 PROCEDURE — 85730 THROMBOPLASTIN TIME PARTIAL: CPT | Performed by: PHYSICIAN ASSISTANT

## 2021-11-11 PROCEDURE — 94669 MECHANICAL CHEST WALL OSCILL: CPT

## 2021-11-11 PROCEDURE — 86900 BLOOD TYPING SEROLOGIC ABO: CPT | Performed by: SURGERY

## 2021-11-11 PROCEDURE — 84145 PROCALCITONIN (PCT): CPT | Performed by: INTERNAL MEDICINE

## 2021-11-11 PROCEDURE — 80048 BASIC METABOLIC PNL TOTAL CA: CPT | Performed by: INTERNAL MEDICINE

## 2021-11-11 PROCEDURE — 85027 COMPLETE CBC AUTOMATED: CPT | Performed by: PHYSICIAN ASSISTANT

## 2021-11-11 PROCEDURE — 94640 AIRWAY INHALATION TREATMENT: CPT

## 2021-11-11 PROCEDURE — 94003 VENT MGMT INPAT SUBQ DAY: CPT

## 2021-11-11 PROCEDURE — 86923 COMPATIBILITY TEST ELECTRIC: CPT

## 2021-11-11 PROCEDURE — 99231 SBSQ HOSP IP/OBS SF/LOW 25: CPT | Performed by: INTERNAL MEDICINE

## 2021-11-11 PROCEDURE — 94760 N-INVAS EAR/PLS OXIMETRY 1: CPT

## 2021-11-11 PROCEDURE — 99233 SBSQ HOSP IP/OBS HIGH 50: CPT | Performed by: INTERNAL MEDICINE

## 2021-11-11 PROCEDURE — 85610 PROTHROMBIN TIME: CPT | Performed by: PHYSICIAN ASSISTANT

## 2021-11-11 RX ORDER — MIDAZOLAM HYDROCHLORIDE 2 MG/2ML
2 INJECTION, SOLUTION INTRAMUSCULAR; INTRAVENOUS ONCE
Status: DISCONTINUED | OUTPATIENT
Start: 2021-11-11 | End: 2021-11-11

## 2021-11-11 RX ORDER — POTASSIUM CHLORIDE 20MEQ/15ML
40 LIQUID (ML) ORAL ONCE
Status: COMPLETED | OUTPATIENT
Start: 2021-11-11 | End: 2021-11-11

## 2021-11-11 RX ORDER — HEPARIN SODIUM 1000 [USP'U]/ML
2000 INJECTION, SOLUTION INTRAVENOUS; SUBCUTANEOUS
Status: DISCONTINUED | OUTPATIENT
Start: 2021-11-11 | End: 2021-11-19

## 2021-11-11 RX ORDER — HEPARIN SODIUM 10000 [USP'U]/100ML
3-20 INJECTION, SOLUTION INTRAVENOUS
Status: DISCONTINUED | OUTPATIENT
Start: 2021-11-11 | End: 2021-11-19

## 2021-11-11 RX ORDER — HEPARIN SODIUM 1000 [USP'U]/ML
4000 INJECTION, SOLUTION INTRAVENOUS; SUBCUTANEOUS
Status: DISCONTINUED | OUTPATIENT
Start: 2021-11-11 | End: 2021-11-19

## 2021-11-11 RX ORDER — POLYETHYLENE GLYCOL 3350 17 G/17G
17 POWDER, FOR SOLUTION ORAL DAILY
Status: DISCONTINUED | OUTPATIENT
Start: 2021-11-12 | End: 2021-11-18

## 2021-11-11 RX ADMIN — HEPARIN SODIUM 4000 UNITS: 1000 INJECTION INTRAVENOUS; SUBCUTANEOUS at 10:58

## 2021-11-11 RX ADMIN — LINEZOLID 600 MG: 600 TABLET, FILM COATED ORAL at 21:38

## 2021-11-11 RX ADMIN — HEPARIN SODIUM 12 UNITS/KG/HR: 10000 INJECTION, SOLUTION INTRAVENOUS at 04:18

## 2021-11-11 RX ADMIN — IPRATROPIUM BROMIDE 0.5 MG: 0.5 SOLUTION RESPIRATORY (INHALATION) at 19:00

## 2021-11-11 RX ADMIN — LEVALBUTEROL HYDROCHLORIDE 1.25 MG: 1.25 SOLUTION, CONCENTRATE RESPIRATORY (INHALATION) at 13:26

## 2021-11-11 RX ADMIN — TICAGRELOR 90 MG: 90 TABLET ORAL at 08:03

## 2021-11-11 RX ADMIN — METOPROLOL TARTRATE 25 MG: 25 TABLET, FILM COATED ORAL at 08:02

## 2021-11-11 RX ADMIN — POTASSIUM CHLORIDE 40 MEQ: 20 SOLUTION ORAL at 10:49

## 2021-11-11 RX ADMIN — CHLORHEXIDINE GLUCONATE 15 ML: 1.2 SOLUTION ORAL at 21:07

## 2021-11-11 RX ADMIN — LINEZOLID 600 MG: 600 TABLET, FILM COATED ORAL at 08:01

## 2021-11-11 RX ADMIN — AMIODARONE HYDROCHLORIDE 200 MG: 200 TABLET ORAL at 15:42

## 2021-11-11 RX ADMIN — AMIODARONE HYDROCHLORIDE 200 MG: 200 TABLET ORAL at 07:10

## 2021-11-11 RX ADMIN — LEVALBUTEROL HYDROCHLORIDE 1.25 MG: 1.25 SOLUTION, CONCENTRATE RESPIRATORY (INHALATION) at 08:04

## 2021-11-11 RX ADMIN — FUROSEMIDE 40 MG: 10 INJECTION, SOLUTION INTRAMUSCULAR; INTRAVENOUS at 15:42

## 2021-11-11 RX ADMIN — SENNOSIDES AND DOCUSATE SODIUM 2 TABLET: 8.6; 5 TABLET ORAL at 21:04

## 2021-11-11 RX ADMIN — IPRATROPIUM BROMIDE 0.5 MG: 0.5 SOLUTION RESPIRATORY (INHALATION) at 13:26

## 2021-11-11 RX ADMIN — LOSARTAN POTASSIUM 25 MG: 25 TABLET, FILM COATED ORAL at 08:02

## 2021-11-11 RX ADMIN — FUROSEMIDE 40 MG: 10 INJECTION, SOLUTION INTRAMUSCULAR; INTRAVENOUS at 07:38

## 2021-11-11 RX ADMIN — PREGABALIN 75 MG: 75 CAPSULE ORAL at 08:07

## 2021-11-11 RX ADMIN — Medication 20 MG: at 05:59

## 2021-11-11 RX ADMIN — Medication 40 MEQ: at 15:42

## 2021-11-11 RX ADMIN — METOPROLOL TARTRATE 25 MG: 25 TABLET, FILM COATED ORAL at 21:04

## 2021-11-11 RX ADMIN — IPRATROPIUM BROMIDE 0.5 MG: 0.5 SOLUTION RESPIRATORY (INHALATION) at 08:04

## 2021-11-11 RX ADMIN — ATORVASTATIN CALCIUM 40 MG: 40 TABLET, FILM COATED ORAL at 15:42

## 2021-11-11 RX ADMIN — LIDOCAINE 5% 1 PATCH: 700 PATCH TOPICAL at 08:23

## 2021-11-11 RX ADMIN — MELATONIN 6 MG: at 21:04

## 2021-11-11 RX ADMIN — SODIUM CHLORIDE 50 ML/HR: 0.45 INJECTION, SOLUTION INTRAVENOUS at 04:24

## 2021-11-11 RX ADMIN — CHLORHEXIDINE GLUCONATE 15 ML: 1.2 SOLUTION ORAL at 08:02

## 2021-11-11 RX ADMIN — PROPOFOL 20 MCG/KG/MIN: 10 INJECTION, EMULSION INTRAVENOUS at 00:38

## 2021-11-11 RX ADMIN — HEPARIN SODIUM 16 UNITS/KG/HR: 10000 INJECTION, SOLUTION INTRAVENOUS at 23:55

## 2021-11-11 RX ADMIN — TICAGRELOR 90 MG: 90 TABLET ORAL at 21:04

## 2021-11-11 RX ADMIN — ASPIRIN 81 MG CHEWABLE TABLET 81 MG: 81 TABLET CHEWABLE at 08:02

## 2021-11-11 RX ADMIN — LEVALBUTEROL HYDROCHLORIDE 1.25 MG: 1.25 SOLUTION, CONCENTRATE RESPIRATORY (INHALATION) at 19:00

## 2021-11-12 ENCOUNTER — ANESTHESIA (INPATIENT)
Dept: PERIOP | Facility: HOSPITAL | Age: 56
DRG: 003 | End: 2021-11-12
Payer: MEDICARE

## 2021-11-12 PROBLEM — I25.10 CAD (CORONARY ARTERY DISEASE): Status: ACTIVE | Noted: 2021-11-12

## 2021-11-12 LAB
ANION GAP SERPL CALCULATED.3IONS-SCNC: 3 MMOL/L (ref 4–13)
APTT PPP: 69 SECONDS (ref 23–37)
ATRIAL RATE: 92 BPM
BUN SERPL-MCNC: 53 MG/DL (ref 5–25)
CA-I BLD-SCNC: 1.05 MMOL/L (ref 1.12–1.32)
CALCIUM SERPL-MCNC: 8 MG/DL (ref 8.3–10.1)
CHLORIDE SERPL-SCNC: 118 MMOL/L (ref 100–108)
CO2 SERPL-SCNC: 28 MMOL/L (ref 21–32)
CREAT SERPL-MCNC: 1.02 MG/DL (ref 0.6–1.3)
ERYTHROCYTE [DISTWIDTH] IN BLOOD BY AUTOMATED COUNT: 18.3 % (ref 11.6–15.1)
GFR SERPL CREATININE-BSD FRML MDRD: 62 ML/MIN/1.73SQ M
GLUCOSE SERPL-MCNC: 112 MG/DL (ref 65–140)
GLUCOSE SERPL-MCNC: 122 MG/DL (ref 65–140)
GLUCOSE SERPL-MCNC: 125 MG/DL (ref 65–140)
GLUCOSE SERPL-MCNC: 132 MG/DL (ref 65–140)
GLUCOSE SERPL-MCNC: 150 MG/DL (ref 65–140)
GLUCOSE SERPL-MCNC: 152 MG/DL (ref 65–140)
GLUCOSE SERPL-MCNC: 159 MG/DL (ref 65–140)
GLUCOSE SERPL-MCNC: 159 MG/DL (ref 65–140)
GLUCOSE SERPL-MCNC: 162 MG/DL (ref 65–140)
GLUCOSE SERPL-MCNC: 168 MG/DL (ref 65–140)
GLUCOSE SERPL-MCNC: 172 MG/DL (ref 65–140)
GLUCOSE SERPL-MCNC: 177 MG/DL (ref 65–140)
GLUCOSE SERPL-MCNC: 185 MG/DL (ref 65–140)
HCT VFR BLD AUTO: 25.7 % (ref 34.8–46.1)
HGB BLD-MCNC: 7.4 G/DL (ref 11.5–15.4)
MAGNESIUM SERPL-MCNC: 2.4 MG/DL (ref 1.6–2.6)
MCH RBC QN AUTO: 26.7 PG (ref 26.8–34.3)
MCHC RBC AUTO-ENTMCNC: 28.8 G/DL (ref 31.4–37.4)
MCV RBC AUTO: 93 FL (ref 82–98)
P AXIS: 35 DEGREES
PHOSPHATE SERPL-MCNC: 3.1 MG/DL (ref 2.7–4.5)
PLATELET # BLD AUTO: 497 THOUSANDS/UL (ref 149–390)
PMV BLD AUTO: 10.1 FL (ref 8.9–12.7)
POTASSIUM SERPL-SCNC: 3.4 MMOL/L (ref 3.5–5.3)
PR INTERVAL: 158 MS
QRS AXIS: -20 DEGREES
QRSD INTERVAL: 100 MS
QT INTERVAL: 416 MS
QTC INTERVAL: 514 MS
RBC # BLD AUTO: 2.77 MILLION/UL (ref 3.81–5.12)
SODIUM SERPL-SCNC: 149 MMOL/L (ref 136–145)
T WAVE AXIS: -17 DEGREES
VENTRICULAR RATE: 92 BPM
WBC # BLD AUTO: 10.31 THOUSAND/UL (ref 4.31–10.16)

## 2021-11-12 PROCEDURE — 94760 N-INVAS EAR/PLS OXIMETRY 1: CPT

## 2021-11-12 PROCEDURE — 99232 SBSQ HOSP IP/OBS MODERATE 35: CPT | Performed by: SURGERY

## 2021-11-12 PROCEDURE — 94640 AIRWAY INHALATION TREATMENT: CPT

## 2021-11-12 PROCEDURE — 0DH63UZ INSERTION OF FEEDING DEVICE INTO STOMACH, PERCUTANEOUS APPROACH: ICD-10-PCS | Performed by: SURGERY

## 2021-11-12 PROCEDURE — 94669 MECHANICAL CHEST WALL OSCILL: CPT

## 2021-11-12 PROCEDURE — C1769 GUIDE WIRE: HCPCS | Performed by: SURGERY

## 2021-11-12 PROCEDURE — 99231 SBSQ HOSP IP/OBS SF/LOW 25: CPT | Performed by: INTERNAL MEDICINE

## 2021-11-12 PROCEDURE — 85730 THROMBOPLASTIN TIME PARTIAL: CPT | Performed by: PHYSICIAN ASSISTANT

## 2021-11-12 PROCEDURE — 0B110F4 BYPASS TRACHEA TO CUTANEOUS WITH TRACHEOSTOMY DEVICE, OPEN APPROACH: ICD-10-PCS | Performed by: SURGERY

## 2021-11-12 PROCEDURE — 85027 COMPLETE CBC AUTOMATED: CPT | Performed by: PHYSICIAN ASSISTANT

## 2021-11-12 PROCEDURE — A7521 TRACH/LARYN TUBE CUFFED: HCPCS | Performed by: SURGERY

## 2021-11-12 PROCEDURE — 80048 BASIC METABOLIC PNL TOTAL CA: CPT | Performed by: PHYSICIAN ASSISTANT

## 2021-11-12 PROCEDURE — 94003 VENT MGMT INPAT SUBQ DAY: CPT

## 2021-11-12 PROCEDURE — 31600 PLANNED TRACHEOSTOMY: CPT | Performed by: SURGERY

## 2021-11-12 PROCEDURE — 93010 ELECTROCARDIOGRAM REPORT: CPT | Performed by: INTERNAL MEDICINE

## 2021-11-12 PROCEDURE — 82948 REAGENT STRIP/BLOOD GLUCOSE: CPT

## 2021-11-12 PROCEDURE — 43246 EGD PLACE GASTROSTOMY TUBE: CPT | Performed by: SURGERY

## 2021-11-12 PROCEDURE — 99233 SBSQ HOSP IP/OBS HIGH 50: CPT | Performed by: INTERNAL MEDICINE

## 2021-11-12 PROCEDURE — NC001 PR NO CHARGE: Performed by: NURSE PRACTITIONER

## 2021-11-12 PROCEDURE — 83735 ASSAY OF MAGNESIUM: CPT | Performed by: PHYSICIAN ASSISTANT

## 2021-11-12 PROCEDURE — 3E0G76Z INTRODUCTION OF NUTRITIONAL SUBSTANCE INTO UPPER GI, VIA NATURAL OR ARTIFICIAL OPENING: ICD-10-PCS | Performed by: SURGERY

## 2021-11-12 PROCEDURE — 84100 ASSAY OF PHOSPHORUS: CPT | Performed by: PHYSICIAN ASSISTANT

## 2021-11-12 PROCEDURE — 99291 CRITICAL CARE FIRST HOUR: CPT | Performed by: ANESTHESIOLOGY

## 2021-11-12 PROCEDURE — 82330 ASSAY OF CALCIUM: CPT | Performed by: PHYSICIAN ASSISTANT

## 2021-11-12 PROCEDURE — 5A1955Z RESPIRATORY VENTILATION, GREATER THAN 96 CONSECUTIVE HOURS: ICD-10-PCS | Performed by: SURGERY

## 2021-11-12 RX ORDER — CEFAZOLIN SODIUM 1 G/3ML
INJECTION, POWDER, FOR SOLUTION INTRAMUSCULAR; INTRAVENOUS AS NEEDED
Status: DISCONTINUED | OUTPATIENT
Start: 2021-11-12 | End: 2021-11-12

## 2021-11-12 RX ORDER — LIDOCAINE HYDROCHLORIDE AND EPINEPHRINE 10; 10 MG/ML; UG/ML
INJECTION, SOLUTION INFILTRATION; PERINEURAL AS NEEDED
Status: DISCONTINUED | OUTPATIENT
Start: 2021-11-12 | End: 2021-11-12 | Stop reason: HOSPADM

## 2021-11-12 RX ORDER — ROCURONIUM BROMIDE 10 MG/ML
INJECTION, SOLUTION INTRAVENOUS AS NEEDED
Status: DISCONTINUED | OUTPATIENT
Start: 2021-11-12 | End: 2021-11-12

## 2021-11-12 RX ORDER — FENTANYL CITRATE 50 UG/ML
INJECTION, SOLUTION INTRAMUSCULAR; INTRAVENOUS AS NEEDED
Status: DISCONTINUED | OUTPATIENT
Start: 2021-11-12 | End: 2021-11-12

## 2021-11-12 RX ORDER — POTASSIUM CHLORIDE 14.9 MG/ML
20 INJECTION INTRAVENOUS
Status: COMPLETED | OUTPATIENT
Start: 2021-11-12 | End: 2021-11-12

## 2021-11-12 RX ORDER — SODIUM CHLORIDE, SODIUM LACTATE, POTASSIUM CHLORIDE, CALCIUM CHLORIDE 600; 310; 30; 20 MG/100ML; MG/100ML; MG/100ML; MG/100ML
INJECTION, SOLUTION INTRAVENOUS CONTINUOUS PRN
Status: DISCONTINUED | OUTPATIENT
Start: 2021-11-12 | End: 2021-11-12

## 2021-11-12 RX ORDER — CALCIUM GLUCONATE 20 MG/ML
2 INJECTION, SOLUTION INTRAVENOUS ONCE
Status: COMPLETED | OUTPATIENT
Start: 2021-11-12 | End: 2021-11-12

## 2021-11-12 RX ADMIN — POTASSIUM CHLORIDE 20 MEQ: 14.9 INJECTION, SOLUTION INTRAVENOUS at 11:21

## 2021-11-12 RX ADMIN — IPRATROPIUM BROMIDE 0.5 MG: 0.5 SOLUTION RESPIRATORY (INHALATION) at 08:48

## 2021-11-12 RX ADMIN — AMIODARONE HYDROCHLORIDE 200 MG: 200 TABLET ORAL at 07:55

## 2021-11-12 RX ADMIN — LEVALBUTEROL HYDROCHLORIDE 1.25 MG: 1.25 SOLUTION, CONCENTRATE RESPIRATORY (INHALATION) at 20:34

## 2021-11-12 RX ADMIN — FENTANYL CITRATE 50 MCG: 50 INJECTION INTRAMUSCULAR; INTRAVENOUS at 15:36

## 2021-11-12 RX ADMIN — FUROSEMIDE 40 MG: 10 INJECTION, SOLUTION INTRAMUSCULAR; INTRAVENOUS at 16:51

## 2021-11-12 RX ADMIN — AMIODARONE HYDROCHLORIDE 200 MG: 200 TABLET ORAL at 16:51

## 2021-11-12 RX ADMIN — PREGABALIN 75 MG: 75 CAPSULE ORAL at 08:01

## 2021-11-12 RX ADMIN — ROCURONIUM BROMIDE 20 MG: 50 INJECTION, SOLUTION INTRAVENOUS at 15:47

## 2021-11-12 RX ADMIN — ROCURONIUM BROMIDE 10 MG: 50 INJECTION, SOLUTION INTRAVENOUS at 15:17

## 2021-11-12 RX ADMIN — LEVALBUTEROL HYDROCHLORIDE 1.25 MG: 1.25 SOLUTION, CONCENTRATE RESPIRATORY (INHALATION) at 13:06

## 2021-11-12 RX ADMIN — FUROSEMIDE 40 MG: 10 INJECTION, SOLUTION INTRAMUSCULAR; INTRAVENOUS at 07:55

## 2021-11-12 RX ADMIN — TICAGRELOR 90 MG: 90 TABLET ORAL at 21:14

## 2021-11-12 RX ADMIN — Medication 20 MG: at 05:56

## 2021-11-12 RX ADMIN — LINEZOLID 600 MG: 600 TABLET, FILM COATED ORAL at 09:17

## 2021-11-12 RX ADMIN — SODIUM CHLORIDE, SODIUM LACTATE, POTASSIUM CHLORIDE, AND CALCIUM CHLORIDE: .6; .31; .03; .02 INJECTION, SOLUTION INTRAVENOUS at 14:53

## 2021-11-12 RX ADMIN — ATORVASTATIN CALCIUM 40 MG: 40 TABLET, FILM COATED ORAL at 16:51

## 2021-11-12 RX ADMIN — POTASSIUM CHLORIDE 20 MEQ: 14.9 INJECTION, SOLUTION INTRAVENOUS at 13:01

## 2021-11-12 RX ADMIN — ASPIRIN 81 MG CHEWABLE TABLET 81 MG: 81 TABLET CHEWABLE at 08:01

## 2021-11-12 RX ADMIN — FENTANYL CITRATE 50 MCG: 50 INJECTION INTRAMUSCULAR; INTRAVENOUS at 14:59

## 2021-11-12 RX ADMIN — LEVALBUTEROL HYDROCHLORIDE 1.25 MG: 1.25 SOLUTION, CONCENTRATE RESPIRATORY (INHALATION) at 08:48

## 2021-11-12 RX ADMIN — LINEZOLID 600 MG: 600 TABLET, FILM COATED ORAL at 21:14

## 2021-11-12 RX ADMIN — LOSARTAN POTASSIUM 25 MG: 25 TABLET, FILM COATED ORAL at 08:01

## 2021-11-12 RX ADMIN — LIDOCAINE 5% 1 PATCH: 700 PATCH TOPICAL at 08:01

## 2021-11-12 RX ADMIN — PHENYLEPHRINE HYDROCHLORIDE 30 MCG/MIN: 10 INJECTION INTRAVENOUS at 15:10

## 2021-11-12 RX ADMIN — METOPROLOL TARTRATE 25 MG: 25 TABLET, FILM COATED ORAL at 21:14

## 2021-11-12 RX ADMIN — ROCURONIUM BROMIDE 20 MG: 50 INJECTION, SOLUTION INTRAVENOUS at 14:59

## 2021-11-12 RX ADMIN — CHLORHEXIDINE GLUCONATE 15 ML: 1.2 SOLUTION ORAL at 21:13

## 2021-11-12 RX ADMIN — SENNOSIDES AND DOCUSATE SODIUM 2 TABLET: 8.6; 5 TABLET ORAL at 21:14

## 2021-11-12 RX ADMIN — TICAGRELOR 90 MG: 90 TABLET ORAL at 08:01

## 2021-11-12 RX ADMIN — IPRATROPIUM BROMIDE 0.5 MG: 0.5 SOLUTION RESPIRATORY (INHALATION) at 20:34

## 2021-11-12 RX ADMIN — SODIUM CHLORIDE 50 ML/HR: 0.45 INJECTION, SOLUTION INTRAVENOUS at 00:21

## 2021-11-12 RX ADMIN — SODIUM CHLORIDE 1.5 UNITS/HR: 9 INJECTION, SOLUTION INTRAVENOUS at 20:23

## 2021-11-12 RX ADMIN — CHLORHEXIDINE GLUCONATE 15 ML: 1.2 SOLUTION ORAL at 08:01

## 2021-11-12 RX ADMIN — CALCIUM GLUCONATE 2 G: 20 INJECTION, SOLUTION INTRAVENOUS at 11:21

## 2021-11-12 RX ADMIN — CEFAZOLIN SODIUM 2000 MG: 1 INJECTION, POWDER, FOR SOLUTION INTRAMUSCULAR; INTRAVENOUS at 15:06

## 2021-11-12 RX ADMIN — METOPROLOL TARTRATE 25 MG: 25 TABLET, FILM COATED ORAL at 08:01

## 2021-11-12 RX ADMIN — IPRATROPIUM BROMIDE 0.5 MG: 0.5 SOLUTION RESPIRATORY (INHALATION) at 13:06

## 2021-11-12 RX ADMIN — MELATONIN 6 MG: at 21:14

## 2021-11-13 LAB
ANION GAP SERPL CALCULATED.3IONS-SCNC: 7 MMOL/L (ref 4–13)
APTT PPP: 44 SECONDS (ref 23–37)
APTT PPP: 57 SECONDS (ref 23–37)
APTT PPP: 83 SECONDS (ref 23–37)
BUN SERPL-MCNC: 48 MG/DL (ref 5–25)
CA-I BLD-SCNC: 1.04 MMOL/L (ref 1.12–1.32)
CALCIUM SERPL-MCNC: 8.1 MG/DL (ref 8.3–10.1)
CHLORIDE SERPL-SCNC: 116 MMOL/L (ref 100–108)
CO2 SERPL-SCNC: 28 MMOL/L (ref 21–32)
CREAT SERPL-MCNC: 1.04 MG/DL (ref 0.6–1.3)
ERYTHROCYTE [DISTWIDTH] IN BLOOD BY AUTOMATED COUNT: 18.7 % (ref 11.6–15.1)
GFR SERPL CREATININE-BSD FRML MDRD: 61 ML/MIN/1.73SQ M
GLUCOSE SERPL-MCNC: 109 MG/DL (ref 65–140)
GLUCOSE SERPL-MCNC: 115 MG/DL (ref 65–140)
GLUCOSE SERPL-MCNC: 118 MG/DL (ref 65–140)
GLUCOSE SERPL-MCNC: 120 MG/DL (ref 65–140)
GLUCOSE SERPL-MCNC: 121 MG/DL (ref 65–140)
GLUCOSE SERPL-MCNC: 131 MG/DL (ref 65–140)
GLUCOSE SERPL-MCNC: 135 MG/DL (ref 65–140)
GLUCOSE SERPL-MCNC: 136 MG/DL (ref 65–140)
GLUCOSE SERPL-MCNC: 137 MG/DL (ref 65–140)
GLUCOSE SERPL-MCNC: 157 MG/DL (ref 65–140)
GLUCOSE SERPL-MCNC: 159 MG/DL (ref 65–140)
GLUCOSE SERPL-MCNC: 173 MG/DL (ref 65–140)
GLUCOSE SERPL-MCNC: 181 MG/DL (ref 65–140)
HCT VFR BLD AUTO: 26.9 % (ref 34.8–46.1)
HGB BLD-MCNC: 7.7 G/DL (ref 11.5–15.4)
MCH RBC QN AUTO: 26.4 PG (ref 26.8–34.3)
MCHC RBC AUTO-ENTMCNC: 28.6 G/DL (ref 31.4–37.4)
MCV RBC AUTO: 92 FL (ref 82–98)
PLATELET # BLD AUTO: 468 THOUSANDS/UL (ref 149–390)
PMV BLD AUTO: 10.3 FL (ref 8.9–12.7)
POTASSIUM SERPL-SCNC: 3.7 MMOL/L (ref 3.5–5.3)
RBC # BLD AUTO: 2.92 MILLION/UL (ref 3.81–5.12)
SODIUM SERPL-SCNC: 151 MMOL/L (ref 136–145)
WBC # BLD AUTO: 13.73 THOUSAND/UL (ref 4.31–10.16)

## 2021-11-13 PROCEDURE — 85027 COMPLETE CBC AUTOMATED: CPT | Performed by: NURSE PRACTITIONER

## 2021-11-13 PROCEDURE — 82948 REAGENT STRIP/BLOOD GLUCOSE: CPT

## 2021-11-13 PROCEDURE — 99291 CRITICAL CARE FIRST HOUR: CPT | Performed by: ANESTHESIOLOGY

## 2021-11-13 PROCEDURE — 94760 N-INVAS EAR/PLS OXIMETRY 1: CPT

## 2021-11-13 PROCEDURE — 80048 BASIC METABOLIC PNL TOTAL CA: CPT | Performed by: NURSE PRACTITIONER

## 2021-11-13 PROCEDURE — 99233 SBSQ HOSP IP/OBS HIGH 50: CPT | Performed by: PSYCHIATRY & NEUROLOGY

## 2021-11-13 PROCEDURE — 99233 SBSQ HOSP IP/OBS HIGH 50: CPT | Performed by: INTERNAL MEDICINE

## 2021-11-13 PROCEDURE — 94003 VENT MGMT INPAT SUBQ DAY: CPT

## 2021-11-13 PROCEDURE — 85730 THROMBOPLASTIN TIME PARTIAL: CPT | Performed by: ANESTHESIOLOGY

## 2021-11-13 PROCEDURE — 85730 THROMBOPLASTIN TIME PARTIAL: CPT | Performed by: NURSE PRACTITIONER

## 2021-11-13 PROCEDURE — 94640 AIRWAY INHALATION TREATMENT: CPT

## 2021-11-13 PROCEDURE — 82330 ASSAY OF CALCIUM: CPT | Performed by: NURSE PRACTITIONER

## 2021-11-13 PROCEDURE — 94669 MECHANICAL CHEST WALL OSCILL: CPT

## 2021-11-13 RX ORDER — POTASSIUM CHLORIDE 20MEQ/15ML
40 LIQUID (ML) ORAL ONCE
Status: COMPLETED | OUTPATIENT
Start: 2021-11-13 | End: 2021-11-13

## 2021-11-13 RX ADMIN — Medication 20 MG: at 05:04

## 2021-11-13 RX ADMIN — AMIODARONE HYDROCHLORIDE 200 MG: 200 TABLET ORAL at 07:38

## 2021-11-13 RX ADMIN — FUROSEMIDE 40 MG: 10 INJECTION, SOLUTION INTRAMUSCULAR; INTRAVENOUS at 07:47

## 2021-11-13 RX ADMIN — POLYETHYLENE GLYCOL 3350 17 G: 17 POWDER, FOR SOLUTION ORAL at 09:32

## 2021-11-13 RX ADMIN — METOPROLOL TARTRATE 25 MG: 25 TABLET, FILM COATED ORAL at 09:33

## 2021-11-13 RX ADMIN — HEPARIN SODIUM 2000 UNITS: 1000 INJECTION INTRAVENOUS; SUBCUTANEOUS at 14:51

## 2021-11-13 RX ADMIN — SODIUM CHLORIDE 50 ML/HR: 0.45 INJECTION, SOLUTION INTRAVENOUS at 20:31

## 2021-11-13 RX ADMIN — TICAGRELOR 90 MG: 90 TABLET ORAL at 22:03

## 2021-11-13 RX ADMIN — PREGABALIN 75 MG: 75 CAPSULE ORAL at 09:32

## 2021-11-13 RX ADMIN — LOSARTAN POTASSIUM 25 MG: 25 TABLET, FILM COATED ORAL at 09:33

## 2021-11-13 RX ADMIN — LEVALBUTEROL HYDROCHLORIDE 1.25 MG: 1.25 SOLUTION, CONCENTRATE RESPIRATORY (INHALATION) at 13:26

## 2021-11-13 RX ADMIN — HEPARIN SODIUM 2000 UNITS: 1000 INJECTION INTRAVENOUS; SUBCUTANEOUS at 09:58

## 2021-11-13 RX ADMIN — IPRATROPIUM BROMIDE 0.5 MG: 0.5 SOLUTION RESPIRATORY (INHALATION) at 13:26

## 2021-11-13 RX ADMIN — ACETAMINOPHEN 975 MG: 650 SUSPENSION ORAL at 08:08

## 2021-11-13 RX ADMIN — ACETAMINOPHEN 975 MG: 650 SUSPENSION ORAL at 15:46

## 2021-11-13 RX ADMIN — Medication 40 MEQ: at 09:33

## 2021-11-13 RX ADMIN — LINEZOLID 600 MG: 600 TABLET, FILM COATED ORAL at 22:03

## 2021-11-13 RX ADMIN — IPRATROPIUM BROMIDE 0.5 MG: 0.5 SOLUTION RESPIRATORY (INHALATION) at 19:56

## 2021-11-13 RX ADMIN — ATORVASTATIN CALCIUM 40 MG: 40 TABLET, FILM COATED ORAL at 15:46

## 2021-11-13 RX ADMIN — SENNOSIDES AND DOCUSATE SODIUM 2 TABLET: 8.6; 5 TABLET ORAL at 22:03

## 2021-11-13 RX ADMIN — CHLORHEXIDINE GLUCONATE 15 ML: 1.2 SOLUTION ORAL at 22:04

## 2021-11-13 RX ADMIN — LEVALBUTEROL HYDROCHLORIDE 1.25 MG: 1.25 SOLUTION, CONCENTRATE RESPIRATORY (INHALATION) at 19:56

## 2021-11-13 RX ADMIN — ASPIRIN 81 MG CHEWABLE TABLET 81 MG: 81 TABLET CHEWABLE at 09:33

## 2021-11-13 RX ADMIN — FUROSEMIDE 40 MG: 10 INJECTION, SOLUTION INTRAMUSCULAR; INTRAVENOUS at 15:46

## 2021-11-13 RX ADMIN — LIDOCAINE 5% 1 PATCH: 700 PATCH TOPICAL at 09:32

## 2021-11-13 RX ADMIN — LEVALBUTEROL HYDROCHLORIDE 1.25 MG: 1.25 SOLUTION, CONCENTRATE RESPIRATORY (INHALATION) at 07:36

## 2021-11-13 RX ADMIN — METOPROLOL TARTRATE 25 MG: 25 TABLET, FILM COATED ORAL at 22:03

## 2021-11-13 RX ADMIN — LINEZOLID 600 MG: 600 TABLET, FILM COATED ORAL at 09:34

## 2021-11-13 RX ADMIN — AMIODARONE HYDROCHLORIDE 200 MG: 200 TABLET ORAL at 15:46

## 2021-11-13 RX ADMIN — HEPARIN SODIUM 16 UNITS/KG/HR: 10000 INJECTION, SOLUTION INTRAVENOUS at 22:44

## 2021-11-13 RX ADMIN — TICAGRELOR 90 MG: 90 TABLET ORAL at 09:33

## 2021-11-13 RX ADMIN — SODIUM CHLORIDE 50 ML/HR: 0.45 INJECTION, SOLUTION INTRAVENOUS at 02:32

## 2021-11-13 RX ADMIN — CHLORHEXIDINE GLUCONATE 15 ML: 1.2 SOLUTION ORAL at 09:32

## 2021-11-13 RX ADMIN — MELATONIN 6 MG: at 22:03

## 2021-11-13 RX ADMIN — IPRATROPIUM BROMIDE 0.5 MG: 0.5 SOLUTION RESPIRATORY (INHALATION) at 07:36

## 2021-11-14 ENCOUNTER — APPOINTMENT (INPATIENT)
Dept: RADIOLOGY | Facility: HOSPITAL | Age: 56
DRG: 003 | End: 2021-11-14
Payer: MEDICARE

## 2021-11-14 LAB
ABO GROUP BLD BPU: NORMAL
ABO GROUP BLD BPU: NORMAL
ANION GAP SERPL CALCULATED.3IONS-SCNC: 5 MMOL/L (ref 4–13)
ANISOCYTOSIS BLD QL SMEAR: PRESENT
APTT PPP: 81 SECONDS (ref 23–37)
ARTIFACT: PRESENT
BASOPHILS # BLD MANUAL: 0 THOUSAND/UL (ref 0–0.1)
BASOPHILS NFR MAR MANUAL: 0 % (ref 0–1)
BPU ID: NORMAL
BPU ID: NORMAL
BUN SERPL-MCNC: 45 MG/DL (ref 5–25)
CALCIUM SERPL-MCNC: 7.7 MG/DL (ref 8.3–10.1)
CHLORIDE SERPL-SCNC: 118 MMOL/L (ref 100–108)
CO2 SERPL-SCNC: 26 MMOL/L (ref 21–32)
CREAT SERPL-MCNC: 1.09 MG/DL (ref 0.6–1.3)
CROSSMATCH: NORMAL
CROSSMATCH: NORMAL
DACRYOCYTES BLD QL SMEAR: PRESENT
EOSINOPHIL # BLD MANUAL: 0 THOUSAND/UL (ref 0–0.4)
EOSINOPHIL NFR BLD MANUAL: 0 % (ref 0–6)
ERYTHROCYTE [DISTWIDTH] IN BLOOD BY AUTOMATED COUNT: 18.8 % (ref 11.6–15.1)
GFR SERPL CREATININE-BSD FRML MDRD: 57 ML/MIN/1.73SQ M
GLUCOSE SERPL-MCNC: 158 MG/DL (ref 65–140)
GLUCOSE SERPL-MCNC: 163 MG/DL (ref 65–140)
GLUCOSE SERPL-MCNC: 166 MG/DL (ref 65–140)
GLUCOSE SERPL-MCNC: 169 MG/DL (ref 65–140)
GLUCOSE SERPL-MCNC: 184 MG/DL (ref 65–140)
GLUCOSE SERPL-MCNC: 197 MG/DL (ref 65–140)
GLUCOSE SERPL-MCNC: 253 MG/DL (ref 65–140)
GLUCOSE SERPL-MCNC: 266 MG/DL (ref 65–140)
GLUCOSE SERPL-MCNC: 296 MG/DL (ref 65–140)
HCT VFR BLD AUTO: 27.6 % (ref 34.8–46.1)
HGB BLD-MCNC: 7.7 G/DL (ref 11.5–15.4)
LYMPHOCYTES # BLD AUTO: 0.57 THOUSAND/UL (ref 0.6–4.47)
LYMPHOCYTES # BLD AUTO: 4 % (ref 14–44)
MAGNESIUM SERPL-MCNC: 2.3 MG/DL (ref 1.6–2.6)
MCH RBC QN AUTO: 26.8 PG (ref 26.8–34.3)
MCHC RBC AUTO-ENTMCNC: 27.9 G/DL (ref 31.4–37.4)
MCV RBC AUTO: 96 FL (ref 82–98)
MONOCYTES # BLD AUTO: 0.29 THOUSAND/UL (ref 0–1.22)
MONOCYTES NFR BLD: 2 % (ref 4–12)
NEUTROPHILS # BLD MANUAL: 13.51 THOUSAND/UL (ref 1.85–7.62)
NEUTS BAND NFR BLD MANUAL: 3 % (ref 0–8)
NEUTS SEG NFR BLD AUTO: 91 % (ref 43–75)
OVALOCYTES BLD QL SMEAR: PRESENT
PHOSPHATE SERPL-MCNC: 2.8 MG/DL (ref 2.7–4.5)
PLATELET # BLD AUTO: 410 THOUSANDS/UL (ref 149–390)
PLATELET BLD QL SMEAR: ABNORMAL
PMV BLD AUTO: 10.3 FL (ref 8.9–12.7)
POIKILOCYTOSIS BLD QL SMEAR: PRESENT
POLYCHROMASIA BLD QL SMEAR: PRESENT
POTASSIUM SERPL-SCNC: 3.7 MMOL/L (ref 3.5–5.3)
RBC # BLD AUTO: 2.87 MILLION/UL (ref 3.81–5.12)
RBC MORPH BLD: PRESENT
SODIUM SERPL-SCNC: 149 MMOL/L (ref 136–145)
UNIT DISPENSE STATUS: NORMAL
UNIT DISPENSE STATUS: NORMAL
UNIT PRODUCT CODE: NORMAL
UNIT PRODUCT CODE: NORMAL
UNIT PRODUCT VOLUME: 350 ML
UNIT PRODUCT VOLUME: 350 ML
UNIT RH: NORMAL
UNIT RH: NORMAL
WBC # BLD AUTO: 14.37 THOUSAND/UL (ref 4.31–10.16)

## 2021-11-14 PROCEDURE — 71260 CT THORAX DX C+: CPT

## 2021-11-14 PROCEDURE — 84100 ASSAY OF PHOSPHORUS: CPT | Performed by: NURSE PRACTITIONER

## 2021-11-14 PROCEDURE — 82948 REAGENT STRIP/BLOOD GLUCOSE: CPT

## 2021-11-14 PROCEDURE — 94003 VENT MGMT INPAT SUBQ DAY: CPT

## 2021-11-14 PROCEDURE — 83735 ASSAY OF MAGNESIUM: CPT | Performed by: NURSE PRACTITIONER

## 2021-11-14 PROCEDURE — 99233 SBSQ HOSP IP/OBS HIGH 50: CPT | Performed by: INTERNAL MEDICINE

## 2021-11-14 PROCEDURE — 99291 CRITICAL CARE FIRST HOUR: CPT | Performed by: ANESTHESIOLOGY

## 2021-11-14 PROCEDURE — 99232 SBSQ HOSP IP/OBS MODERATE 35: CPT | Performed by: INTERNAL MEDICINE

## 2021-11-14 PROCEDURE — 85007 BL SMEAR W/DIFF WBC COUNT: CPT | Performed by: NURSE PRACTITIONER

## 2021-11-14 PROCEDURE — 94640 AIRWAY INHALATION TREATMENT: CPT

## 2021-11-14 PROCEDURE — 85027 COMPLETE CBC AUTOMATED: CPT | Performed by: NURSE PRACTITIONER

## 2021-11-14 PROCEDURE — 87040 BLOOD CULTURE FOR BACTERIA: CPT | Performed by: PHYSICIAN ASSISTANT

## 2021-11-14 PROCEDURE — 0T9B70Z DRAINAGE OF BLADDER WITH DRAINAGE DEVICE, VIA NATURAL OR ARTIFICIAL OPENING: ICD-10-PCS | Performed by: UROLOGY

## 2021-11-14 PROCEDURE — 80048 BASIC METABOLIC PNL TOTAL CA: CPT | Performed by: NURSE PRACTITIONER

## 2021-11-14 PROCEDURE — 74177 CT ABD & PELVIS W/CONTRAST: CPT

## 2021-11-14 PROCEDURE — G1004 CDSM NDSC: HCPCS

## 2021-11-14 PROCEDURE — 94760 N-INVAS EAR/PLS OXIMETRY 1: CPT

## 2021-11-14 PROCEDURE — 85730 THROMBOPLASTIN TIME PARTIAL: CPT | Performed by: ANESTHESIOLOGY

## 2021-11-14 RX ORDER — ACETAMINOPHEN 160 MG/5ML
975 SUSPENSION, ORAL (FINAL DOSE FORM) ORAL EVERY 6 HOURS PRN
Status: DISCONTINUED | OUTPATIENT
Start: 2021-11-14 | End: 2021-11-15

## 2021-11-14 RX ORDER — INSULIN GLARGINE 100 [IU]/ML
20 INJECTION, SOLUTION SUBCUTANEOUS EVERY MORNING
Status: DISCONTINUED | OUTPATIENT
Start: 2021-11-14 | End: 2021-11-14

## 2021-11-14 RX ADMIN — CHLORHEXIDINE GLUCONATE 15 ML: 1.2 SOLUTION ORAL at 21:56

## 2021-11-14 RX ADMIN — FUROSEMIDE 40 MG: 10 INJECTION, SOLUTION INTRAMUSCULAR; INTRAVENOUS at 15:57

## 2021-11-14 RX ADMIN — IPRATROPIUM BROMIDE 0.5 MG: 0.5 SOLUTION RESPIRATORY (INHALATION) at 20:29

## 2021-11-14 RX ADMIN — INSULIN LISPRO 6 UNITS: 100 INJECTION, SOLUTION INTRAVENOUS; SUBCUTANEOUS at 17:20

## 2021-11-14 RX ADMIN — METOPROLOL TARTRATE 25 MG: 25 TABLET, FILM COATED ORAL at 08:25

## 2021-11-14 RX ADMIN — AMIODARONE HYDROCHLORIDE 200 MG: 200 TABLET ORAL at 15:57

## 2021-11-14 RX ADMIN — INSULIN HUMAN 10 UNITS: 100 INJECTION, SOLUTION PARENTERAL at 23:58

## 2021-11-14 RX ADMIN — TICAGRELOR 90 MG: 90 TABLET ORAL at 21:57

## 2021-11-14 RX ADMIN — LEVALBUTEROL HYDROCHLORIDE 1.25 MG: 1.25 SOLUTION, CONCENTRATE RESPIRATORY (INHALATION) at 13:44

## 2021-11-14 RX ADMIN — TICAGRELOR 90 MG: 90 TABLET ORAL at 08:23

## 2021-11-14 RX ADMIN — LEVALBUTEROL HYDROCHLORIDE 1.25 MG: 1.25 SOLUTION, CONCENTRATE RESPIRATORY (INHALATION) at 20:29

## 2021-11-14 RX ADMIN — FUROSEMIDE 40 MG: 10 INJECTION, SOLUTION INTRAMUSCULAR; INTRAVENOUS at 08:23

## 2021-11-14 RX ADMIN — HEPARIN SODIUM 16 UNITS/KG/HR: 10000 INJECTION, SOLUTION INTRAVENOUS at 19:42

## 2021-11-14 RX ADMIN — LOSARTAN POTASSIUM 25 MG: 25 TABLET, FILM COATED ORAL at 08:25

## 2021-11-14 RX ADMIN — ASPIRIN 81 MG CHEWABLE TABLET 81 MG: 81 TABLET CHEWABLE at 08:23

## 2021-11-14 RX ADMIN — MELATONIN 6 MG: at 21:56

## 2021-11-14 RX ADMIN — METOPROLOL TARTRATE 25 MG: 25 TABLET, FILM COATED ORAL at 21:57

## 2021-11-14 RX ADMIN — IOHEXOL 100 ML: 350 INJECTION, SOLUTION INTRAVENOUS at 18:29

## 2021-11-14 RX ADMIN — INSULIN GLARGINE 20 UNITS: 100 INJECTION, SOLUTION SUBCUTANEOUS at 09:25

## 2021-11-14 RX ADMIN — IPRATROPIUM BROMIDE 0.5 MG: 0.5 SOLUTION RESPIRATORY (INHALATION) at 07:44

## 2021-11-14 RX ADMIN — INSULIN HUMAN 10 UNITS: 100 INJECTION, SOLUTION PARENTERAL at 13:44

## 2021-11-14 RX ADMIN — IPRATROPIUM BROMIDE 0.5 MG: 0.5 SOLUTION RESPIRATORY (INHALATION) at 13:44

## 2021-11-14 RX ADMIN — PREGABALIN 75 MG: 75 CAPSULE ORAL at 08:29

## 2021-11-14 RX ADMIN — INSULIN LISPRO 4 UNITS: 100 INJECTION, SOLUTION INTRAVENOUS; SUBCUTANEOUS at 23:59

## 2021-11-14 RX ADMIN — ACETAMINOPHEN 975 MG: 650 SUSPENSION ORAL at 06:21

## 2021-11-14 RX ADMIN — Medication 20 MG: at 06:21

## 2021-11-14 RX ADMIN — INSULIN LISPRO 6 UNITS: 100 INJECTION, SOLUTION INTRAVENOUS; SUBCUTANEOUS at 12:08

## 2021-11-14 RX ADMIN — LEVALBUTEROL HYDROCHLORIDE 1.25 MG: 1.25 SOLUTION, CONCENTRATE RESPIRATORY (INHALATION) at 07:44

## 2021-11-14 RX ADMIN — CHLORHEXIDINE GLUCONATE 15 ML: 1.2 SOLUTION ORAL at 08:23

## 2021-11-14 RX ADMIN — ACETAMINOPHEN 975 MG: 650 SUSPENSION ORAL at 12:39

## 2021-11-14 RX ADMIN — ATORVASTATIN CALCIUM 40 MG: 40 TABLET, FILM COATED ORAL at 15:57

## 2021-11-14 RX ADMIN — INSULIN HUMAN 10 UNITS: 100 INJECTION, SOLUTION PARENTERAL at 17:20

## 2021-11-14 RX ADMIN — AMIODARONE HYDROCHLORIDE 200 MG: 200 TABLET ORAL at 08:24

## 2021-11-15 ENCOUNTER — TELEPHONE (OUTPATIENT)
Dept: PALLIATIVE MEDICINE | Facility: CLINIC | Age: 56
End: 2021-11-15

## 2021-11-15 LAB
ABO GROUP BLD: NORMAL
ANION GAP SERPL CALCULATED.3IONS-SCNC: 5 MMOL/L (ref 4–13)
APTT PPP: 62 SECONDS (ref 23–37)
BLD GP AB SCN SERPL QL: NEGATIVE
BUN SERPL-MCNC: 43 MG/DL (ref 5–25)
CALCIUM SERPL-MCNC: 7.8 MG/DL (ref 8.3–10.1)
CHLORIDE SERPL-SCNC: 116 MMOL/L (ref 100–108)
CO2 SERPL-SCNC: 26 MMOL/L (ref 21–32)
CREAT SERPL-MCNC: 1 MG/DL (ref 0.6–1.3)
ERYTHROCYTE [DISTWIDTH] IN BLOOD BY AUTOMATED COUNT: 18.8 % (ref 11.6–15.1)
GFR SERPL CREATININE-BSD FRML MDRD: 64 ML/MIN/1.73SQ M
GLUCOSE SERPL-MCNC: 220 MG/DL (ref 65–140)
GLUCOSE SERPL-MCNC: 264 MG/DL (ref 65–140)
GLUCOSE SERPL-MCNC: 283 MG/DL (ref 65–140)
GLUCOSE SERPL-MCNC: 349 MG/DL (ref 65–140)
GLUCOSE SERPL-MCNC: 360 MG/DL (ref 65–140)
GLUCOSE SERPL-MCNC: 382 MG/DL (ref 65–140)
HCT VFR BLD AUTO: 24.6 % (ref 34.8–46.1)
HGB BLD-MCNC: 7 G/DL (ref 11.5–15.4)
MCH RBC QN AUTO: 27.2 PG (ref 26.8–34.3)
MCHC RBC AUTO-ENTMCNC: 28.5 G/DL (ref 31.4–37.4)
MCV RBC AUTO: 96 FL (ref 82–98)
PLATELET # BLD AUTO: 314 THOUSANDS/UL (ref 149–390)
PMV BLD AUTO: 10.9 FL (ref 8.9–12.7)
POTASSIUM SERPL-SCNC: 4.1 MMOL/L (ref 3.5–5.3)
PROCALCITONIN SERPL-MCNC: 0.27 NG/ML
RBC # BLD AUTO: 2.57 MILLION/UL (ref 3.81–5.12)
RH BLD: POSITIVE
SODIUM SERPL-SCNC: 147 MMOL/L (ref 136–145)
SPECIMEN EXPIRATION DATE: NORMAL
WBC # BLD AUTO: 13.26 THOUSAND/UL (ref 4.31–10.16)

## 2021-11-15 PROCEDURE — 99232 SBSQ HOSP IP/OBS MODERATE 35: CPT | Performed by: INTERNAL MEDICINE

## 2021-11-15 PROCEDURE — 94640 AIRWAY INHALATION TREATMENT: CPT

## 2021-11-15 PROCEDURE — 99233 SBSQ HOSP IP/OBS HIGH 50: CPT | Performed by: ANESTHESIOLOGY

## 2021-11-15 PROCEDURE — 80048 BASIC METABOLIC PNL TOTAL CA: CPT | Performed by: PHYSICIAN ASSISTANT

## 2021-11-15 PROCEDURE — 86850 RBC ANTIBODY SCREEN: CPT | Performed by: NURSE PRACTITIONER

## 2021-11-15 PROCEDURE — 97112 NEUROMUSCULAR REEDUCATION: CPT

## 2021-11-15 PROCEDURE — 99233 SBSQ HOSP IP/OBS HIGH 50: CPT | Performed by: INTERNAL MEDICINE

## 2021-11-15 PROCEDURE — 97129 THER IVNTJ 1ST 15 MIN: CPT

## 2021-11-15 PROCEDURE — 94003 VENT MGMT INPAT SUBQ DAY: CPT

## 2021-11-15 PROCEDURE — 86900 BLOOD TYPING SEROLOGIC ABO: CPT | Performed by: NURSE PRACTITIONER

## 2021-11-15 PROCEDURE — 99232 SBSQ HOSP IP/OBS MODERATE 35: CPT | Performed by: PSYCHIATRY & NEUROLOGY

## 2021-11-15 PROCEDURE — 94760 N-INVAS EAR/PLS OXIMETRY 1: CPT

## 2021-11-15 PROCEDURE — 97168 OT RE-EVAL EST PLAN CARE: CPT

## 2021-11-15 PROCEDURE — 85730 THROMBOPLASTIN TIME PARTIAL: CPT | Performed by: ANESTHESIOLOGY

## 2021-11-15 PROCEDURE — 97164 PT RE-EVAL EST PLAN CARE: CPT

## 2021-11-15 PROCEDURE — 86901 BLOOD TYPING SEROLOGIC RH(D): CPT | Performed by: NURSE PRACTITIONER

## 2021-11-15 PROCEDURE — 82948 REAGENT STRIP/BLOOD GLUCOSE: CPT

## 2021-11-15 PROCEDURE — 84145 PROCALCITONIN (PCT): CPT | Performed by: NURSE PRACTITIONER

## 2021-11-15 PROCEDURE — 85027 COMPLETE CBC AUTOMATED: CPT | Performed by: PHYSICIAN ASSISTANT

## 2021-11-15 RX ORDER — ACETAMINOPHEN 160 MG/5ML
975 SUSPENSION, ORAL (FINAL DOSE FORM) ORAL EVERY 8 HOURS
Status: DISCONTINUED | OUTPATIENT
Start: 2021-11-15 | End: 2021-11-18

## 2021-11-15 RX ADMIN — INSULIN LISPRO 10 UNITS: 100 INJECTION, SOLUTION INTRAVENOUS; SUBCUTANEOUS at 11:50

## 2021-11-15 RX ADMIN — ATORVASTATIN CALCIUM 40 MG: 40 TABLET, FILM COATED ORAL at 17:21

## 2021-11-15 RX ADMIN — TICAGRELOR 90 MG: 90 TABLET ORAL at 21:18

## 2021-11-15 RX ADMIN — ACETAMINOPHEN 975 MG: 650 SUSPENSION ORAL at 02:26

## 2021-11-15 RX ADMIN — IPRATROPIUM BROMIDE 0.5 MG: 0.5 SOLUTION RESPIRATORY (INHALATION) at 19:09

## 2021-11-15 RX ADMIN — CHLORHEXIDINE GLUCONATE 15 ML: 1.2 SOLUTION ORAL at 21:18

## 2021-11-15 RX ADMIN — LEVALBUTEROL HYDROCHLORIDE 1.25 MG: 1.25 SOLUTION, CONCENTRATE RESPIRATORY (INHALATION) at 19:09

## 2021-11-15 RX ADMIN — INSULIN LISPRO 10 UNITS: 100 INJECTION, SOLUTION INTRAVENOUS; SUBCUTANEOUS at 23:45

## 2021-11-15 RX ADMIN — AMIODARONE HYDROCHLORIDE 200 MG: 200 TABLET ORAL at 08:30

## 2021-11-15 RX ADMIN — MELATONIN 6 MG: at 21:18

## 2021-11-15 RX ADMIN — INSULIN LISPRO 6 UNITS: 100 INJECTION, SOLUTION INTRAVENOUS; SUBCUTANEOUS at 05:52

## 2021-11-15 RX ADMIN — INSULIN HUMAN 25 UNITS: 100 INJECTION, SOLUTION PARENTERAL at 17:24

## 2021-11-15 RX ADMIN — INSULIN HUMAN 10 UNITS: 100 INJECTION, SOLUTION PARENTERAL at 05:52

## 2021-11-15 RX ADMIN — METOPROLOL TARTRATE 25 MG: 25 TABLET, FILM COATED ORAL at 21:18

## 2021-11-15 RX ADMIN — LEVALBUTEROL HYDROCHLORIDE 1.25 MG: 1.25 SOLUTION, CONCENTRATE RESPIRATORY (INHALATION) at 07:53

## 2021-11-15 RX ADMIN — LEVALBUTEROL HYDROCHLORIDE 1.25 MG: 1.25 SOLUTION, CONCENTRATE RESPIRATORY (INHALATION) at 13:46

## 2021-11-15 RX ADMIN — ACETAMINOPHEN 975 MG: 650 SUSPENSION ORAL at 21:00

## 2021-11-15 RX ADMIN — POLYETHYLENE GLYCOL 3350 17 G: 17 POWDER, FOR SOLUTION ORAL at 08:38

## 2021-11-15 RX ADMIN — HEPARIN SODIUM 16 UNITS/KG/HR: 10000 INJECTION, SOLUTION INTRAVENOUS at 17:45

## 2021-11-15 RX ADMIN — FUROSEMIDE 40 MG: 10 INJECTION, SOLUTION INTRAMUSCULAR; INTRAVENOUS at 08:38

## 2021-11-15 RX ADMIN — SENNOSIDES AND DOCUSATE SODIUM 2 TABLET: 8.6; 5 TABLET ORAL at 21:17

## 2021-11-15 RX ADMIN — CHLORHEXIDINE GLUCONATE 15 ML: 1.2 SOLUTION ORAL at 08:38

## 2021-11-15 RX ADMIN — LOSARTAN POTASSIUM 25 MG: 25 TABLET, FILM COATED ORAL at 08:37

## 2021-11-15 RX ADMIN — AMIODARONE HYDROCHLORIDE 200 MG: 200 TABLET ORAL at 17:21

## 2021-11-15 RX ADMIN — Medication 20 MG: at 05:52

## 2021-11-15 RX ADMIN — METOPROLOL TARTRATE 25 MG: 25 TABLET, FILM COATED ORAL at 08:37

## 2021-11-15 RX ADMIN — TICAGRELOR 90 MG: 90 TABLET ORAL at 08:38

## 2021-11-15 RX ADMIN — ASPIRIN 81 MG CHEWABLE TABLET 81 MG: 81 TABLET CHEWABLE at 08:37

## 2021-11-15 RX ADMIN — PREGABALIN 75 MG: 75 CAPSULE ORAL at 08:45

## 2021-11-15 RX ADMIN — ACETAMINOPHEN 975 MG: 650 SUSPENSION ORAL at 11:50

## 2021-11-15 RX ADMIN — INSULIN HUMAN 25 UNITS: 100 INJECTION, SOLUTION PARENTERAL at 22:48

## 2021-11-15 RX ADMIN — INSULIN HUMAN 8 UNITS: 100 INJECTION, SOLUTION PARENTERAL at 23:46

## 2021-11-15 RX ADMIN — IPRATROPIUM BROMIDE 0.5 MG: 0.5 SOLUTION RESPIRATORY (INHALATION) at 13:46

## 2021-11-15 RX ADMIN — INSULIN HUMAN 10 UNITS: 100 INJECTION, SOLUTION PARENTERAL at 17:31

## 2021-11-15 RX ADMIN — IPRATROPIUM BROMIDE 0.5 MG: 0.5 SOLUTION RESPIRATORY (INHALATION) at 07:52

## 2021-11-15 RX ADMIN — FUROSEMIDE 40 MG: 10 INJECTION, SOLUTION INTRAMUSCULAR; INTRAVENOUS at 17:00

## 2021-11-16 LAB
ANION GAP SERPL CALCULATED.3IONS-SCNC: 5 MMOL/L (ref 4–13)
APTT PPP: 37 SECONDS (ref 23–37)
APTT PPP: 57 SECONDS (ref 23–37)
APTT PPP: 74 SECONDS (ref 23–37)
BUN SERPL-MCNC: 38 MG/DL (ref 5–25)
CALCIUM SERPL-MCNC: 7.1 MG/DL (ref 8.3–10.1)
CHLORIDE SERPL-SCNC: 115 MMOL/L (ref 100–108)
CO2 SERPL-SCNC: 24 MMOL/L (ref 21–32)
CREAT SERPL-MCNC: 0.74 MG/DL (ref 0.6–1.3)
ERYTHROCYTE [DISTWIDTH] IN BLOOD BY AUTOMATED COUNT: 18.6 % (ref 11.6–15.1)
GFR SERPL CREATININE-BSD FRML MDRD: 91 ML/MIN/1.73SQ M
GLUCOSE SERPL-MCNC: 228 MG/DL (ref 65–140)
GLUCOSE SERPL-MCNC: 250 MG/DL (ref 65–140)
GLUCOSE SERPL-MCNC: 253 MG/DL (ref 65–140)
GLUCOSE SERPL-MCNC: 286 MG/DL (ref 65–140)
GLUCOSE SERPL-MCNC: 325 MG/DL (ref 65–140)
HCT VFR BLD AUTO: 24.9 % (ref 34.8–46.1)
HGB BLD-MCNC: 7.1 G/DL (ref 11.5–15.4)
MAGNESIUM SERPL-MCNC: 1.8 MG/DL (ref 1.6–2.6)
MCH RBC QN AUTO: 26.8 PG (ref 26.8–34.3)
MCHC RBC AUTO-ENTMCNC: 28.5 G/DL (ref 31.4–37.4)
MCV RBC AUTO: 94 FL (ref 82–98)
PHOSPHATE SERPL-MCNC: 1.4 MG/DL (ref 2.7–4.5)
PLATELET # BLD AUTO: 306 THOUSANDS/UL (ref 149–390)
PMV BLD AUTO: 10.6 FL (ref 8.9–12.7)
POTASSIUM SERPL-SCNC: 3.6 MMOL/L (ref 3.5–5.3)
PROCALCITONIN SERPL-MCNC: 0.22 NG/ML
RBC # BLD AUTO: 2.65 MILLION/UL (ref 3.81–5.12)
SODIUM SERPL-SCNC: 144 MMOL/L (ref 136–145)
WBC # BLD AUTO: 10.36 THOUSAND/UL (ref 4.31–10.16)

## 2021-11-16 PROCEDURE — 84100 ASSAY OF PHOSPHORUS: CPT | Performed by: NURSE PRACTITIONER

## 2021-11-16 PROCEDURE — 85730 THROMBOPLASTIN TIME PARTIAL: CPT | Performed by: ANESTHESIOLOGY

## 2021-11-16 PROCEDURE — 99231 SBSQ HOSP IP/OBS SF/LOW 25: CPT | Performed by: INTERNAL MEDICINE

## 2021-11-16 PROCEDURE — 82948 REAGENT STRIP/BLOOD GLUCOSE: CPT

## 2021-11-16 PROCEDURE — 99232 SBSQ HOSP IP/OBS MODERATE 35: CPT | Performed by: INTERNAL MEDICINE

## 2021-11-16 PROCEDURE — 94760 N-INVAS EAR/PLS OXIMETRY 1: CPT

## 2021-11-16 PROCEDURE — 94003 VENT MGMT INPAT SUBQ DAY: CPT

## 2021-11-16 PROCEDURE — 84145 PROCALCITONIN (PCT): CPT | Performed by: NURSE PRACTITIONER

## 2021-11-16 PROCEDURE — 80048 BASIC METABOLIC PNL TOTAL CA: CPT | Performed by: NURSE PRACTITIONER

## 2021-11-16 PROCEDURE — 99233 SBSQ HOSP IP/OBS HIGH 50: CPT | Performed by: ANESTHESIOLOGY

## 2021-11-16 PROCEDURE — 83735 ASSAY OF MAGNESIUM: CPT | Performed by: NURSE PRACTITIONER

## 2021-11-16 PROCEDURE — 94640 AIRWAY INHALATION TREATMENT: CPT

## 2021-11-16 PROCEDURE — 85027 COMPLETE CBC AUTOMATED: CPT | Performed by: NURSE PRACTITIONER

## 2021-11-16 RX ORDER — CALCIUM GLUCONATE 20 MG/ML
1 INJECTION, SOLUTION INTRAVENOUS ONCE
Status: COMPLETED | OUTPATIENT
Start: 2021-11-16 | End: 2021-11-16

## 2021-11-16 RX ORDER — MAGNESIUM SULFATE HEPTAHYDRATE 40 MG/ML
2 INJECTION, SOLUTION INTRAVENOUS ONCE
Status: COMPLETED | OUTPATIENT
Start: 2021-11-16 | End: 2021-11-16

## 2021-11-16 RX ORDER — LOSARTAN POTASSIUM 50 MG/1
50 TABLET ORAL DAILY
Status: DISCONTINUED | OUTPATIENT
Start: 2021-11-17 | End: 2021-11-21

## 2021-11-16 RX ORDER — FUROSEMIDE 10 MG/ML
40 SOLUTION ORAL
Status: DISCONTINUED | OUTPATIENT
Start: 2021-11-16 | End: 2021-11-18

## 2021-11-16 RX ORDER — POTASSIUM CHLORIDE 20MEQ/15ML
20 LIQUID (ML) ORAL 2 TIMES DAILY
Status: DISCONTINUED | OUTPATIENT
Start: 2021-11-16 | End: 2021-11-20

## 2021-11-16 RX ORDER — FUROSEMIDE 10 MG/ML
40 SOLUTION ORAL
Status: DISCONTINUED | OUTPATIENT
Start: 2021-11-16 | End: 2021-11-16

## 2021-11-16 RX ADMIN — CALCIUM GLUCONATE 1 G: 20 INJECTION, SOLUTION INTRAVENOUS at 11:19

## 2021-11-16 RX ADMIN — FUROSEMIDE 40 MG: 10 SOLUTION ORAL at 15:37

## 2021-11-16 RX ADMIN — POLYETHYLENE GLYCOL 3350 17 G: 17 POWDER, FOR SOLUTION ORAL at 08:02

## 2021-11-16 RX ADMIN — TICAGRELOR 90 MG: 90 TABLET ORAL at 21:15

## 2021-11-16 RX ADMIN — IPRATROPIUM BROMIDE 0.5 MG: 0.5 SOLUTION RESPIRATORY (INHALATION) at 07:37

## 2021-11-16 RX ADMIN — HEPARIN SODIUM 18 UNITS/KG/HR: 10000 INJECTION, SOLUTION INTRAVENOUS at 15:31

## 2021-11-16 RX ADMIN — LEVALBUTEROL HYDROCHLORIDE 1.25 MG: 1.25 SOLUTION, CONCENTRATE RESPIRATORY (INHALATION) at 20:02

## 2021-11-16 RX ADMIN — ACETAMINOPHEN 975 MG: 650 SUSPENSION ORAL at 04:58

## 2021-11-16 RX ADMIN — ATORVASTATIN CALCIUM 40 MG: 40 TABLET, FILM COATED ORAL at 16:42

## 2021-11-16 RX ADMIN — MAGNESIUM SULFATE HEPTAHYDRATE 2 G: 40 INJECTION, SOLUTION INTRAVENOUS at 11:20

## 2021-11-16 RX ADMIN — METOPROLOL TARTRATE 25 MG: 25 TABLET, FILM COATED ORAL at 21:14

## 2021-11-16 RX ADMIN — MELATONIN 6 MG: at 21:15

## 2021-11-16 RX ADMIN — PREGABALIN 75 MG: 75 CAPSULE ORAL at 08:33

## 2021-11-16 RX ADMIN — HEPARIN SODIUM 2000 UNITS: 1000 INJECTION INTRAVENOUS; SUBCUTANEOUS at 07:57

## 2021-11-16 RX ADMIN — FUROSEMIDE 40 MG: 10 INJECTION, SOLUTION INTRAMUSCULAR; INTRAVENOUS at 08:02

## 2021-11-16 RX ADMIN — CHLORHEXIDINE GLUCONATE 15 ML: 1.2 SOLUTION ORAL at 08:02

## 2021-11-16 RX ADMIN — METOPROLOL TARTRATE 25 MG: 25 TABLET, FILM COATED ORAL at 08:02

## 2021-11-16 RX ADMIN — IPRATROPIUM BROMIDE 0.5 MG: 0.5 SOLUTION RESPIRATORY (INHALATION) at 13:24

## 2021-11-16 RX ADMIN — SENNOSIDES AND DOCUSATE SODIUM 2 TABLET: 8.6; 5 TABLET ORAL at 21:14

## 2021-11-16 RX ADMIN — LOSARTAN POTASSIUM 25 MG: 25 TABLET, FILM COATED ORAL at 08:02

## 2021-11-16 RX ADMIN — AMIODARONE HYDROCHLORIDE 200 MG: 200 TABLET ORAL at 08:02

## 2021-11-16 RX ADMIN — ASPIRIN 81 MG CHEWABLE TABLET 81 MG: 81 TABLET CHEWABLE at 08:02

## 2021-11-16 RX ADMIN — LEVALBUTEROL HYDROCHLORIDE 1.25 MG: 1.25 SOLUTION, CONCENTRATE RESPIRATORY (INHALATION) at 07:37

## 2021-11-16 RX ADMIN — POTASSIUM PHOSPHATE, MONOBASIC POTASSIUM PHOSPHATE, DIBASIC 30 MMOL: 224; 236 INJECTION, SOLUTION, CONCENTRATE INTRAVENOUS at 12:24

## 2021-11-16 RX ADMIN — POTASSIUM CHLORIDE 20 MEQ: 20 SOLUTION ORAL at 18:30

## 2021-11-16 RX ADMIN — LEVALBUTEROL HYDROCHLORIDE 1.25 MG: 1.25 SOLUTION, CONCENTRATE RESPIRATORY (INHALATION) at 13:24

## 2021-11-16 RX ADMIN — Medication 20 MG: at 05:31

## 2021-11-16 RX ADMIN — ACETAMINOPHEN 975 MG: 650 SUSPENSION ORAL at 11:30

## 2021-11-16 RX ADMIN — IPRATROPIUM BROMIDE 0.5 MG: 0.5 SOLUTION RESPIRATORY (INHALATION) at 20:02

## 2021-11-16 RX ADMIN — INSULIN HUMAN 25 UNITS: 100 INJECTION, SOLUTION PARENTERAL at 04:59

## 2021-11-16 RX ADMIN — AMIODARONE HYDROCHLORIDE 200 MG: 200 TABLET ORAL at 16:43

## 2021-11-16 RX ADMIN — INSULIN HUMAN 6 UNITS: 100 INJECTION, SOLUTION PARENTERAL at 11:17

## 2021-11-16 RX ADMIN — TICAGRELOR 90 MG: 90 TABLET ORAL at 08:02

## 2021-11-16 RX ADMIN — CHLORHEXIDINE GLUCONATE 15 ML: 1.2 SOLUTION ORAL at 21:16

## 2021-11-16 RX ADMIN — ACETAMINOPHEN 975 MG: 650 SUSPENSION ORAL at 21:00

## 2021-11-16 RX ADMIN — INSULIN HUMAN 6 UNITS: 100 INJECTION, SOLUTION PARENTERAL at 05:48

## 2021-11-17 LAB
ANION GAP SERPL CALCULATED.3IONS-SCNC: 6 MMOL/L (ref 4–13)
APTT PPP: 110 SECONDS (ref 23–37)
APTT PPP: 83 SECONDS (ref 23–37)
APTT PPP: 90 SECONDS (ref 23–37)
BUN SERPL-MCNC: 35 MG/DL (ref 5–25)
CA-I BLD-SCNC: 1.05 MMOL/L (ref 1.12–1.32)
CALCIUM SERPL-MCNC: 7.9 MG/DL (ref 8.3–10.1)
CHLORIDE SERPL-SCNC: 109 MMOL/L (ref 100–108)
CO2 SERPL-SCNC: 26 MMOL/L (ref 21–32)
CREAT SERPL-MCNC: 0.7 MG/DL (ref 0.6–1.3)
ERYTHROCYTE [DISTWIDTH] IN BLOOD BY AUTOMATED COUNT: 18.8 % (ref 11.6–15.1)
GFR SERPL CREATININE-BSD FRML MDRD: 98 ML/MIN/1.73SQ M
GLUCOSE SERPL-MCNC: 106 MG/DL (ref 65–140)
GLUCOSE SERPL-MCNC: 132 MG/DL (ref 65–140)
GLUCOSE SERPL-MCNC: 132 MG/DL (ref 65–140)
GLUCOSE SERPL-MCNC: 159 MG/DL (ref 65–140)
GLUCOSE SERPL-MCNC: 164 MG/DL (ref 65–140)
GLUCOSE SERPL-MCNC: 266 MG/DL (ref 65–140)
HCT VFR BLD AUTO: 25.4 % (ref 34.8–46.1)
HGB BLD-MCNC: 7.4 G/DL (ref 11.5–15.4)
MAGNESIUM SERPL-MCNC: 2.3 MG/DL (ref 1.6–2.6)
MCH RBC QN AUTO: 26.9 PG (ref 26.8–34.3)
MCHC RBC AUTO-ENTMCNC: 29.1 G/DL (ref 31.4–37.4)
MCV RBC AUTO: 92 FL (ref 82–98)
PHOSPHATE SERPL-MCNC: 3.3 MG/DL (ref 2.7–4.5)
PLATELET # BLD AUTO: 312 THOUSANDS/UL (ref 149–390)
PMV BLD AUTO: 11.1 FL (ref 8.9–12.7)
POTASSIUM SERPL-SCNC: 4.7 MMOL/L (ref 3.5–5.3)
RBC # BLD AUTO: 2.75 MILLION/UL (ref 3.81–5.12)
SODIUM SERPL-SCNC: 141 MMOL/L (ref 136–145)
WBC # BLD AUTO: 11.45 THOUSAND/UL (ref 4.31–10.16)

## 2021-11-17 PROCEDURE — 99231 SBSQ HOSP IP/OBS SF/LOW 25: CPT | Performed by: INTERNAL MEDICINE

## 2021-11-17 PROCEDURE — 84100 ASSAY OF PHOSPHORUS: CPT | Performed by: NURSE PRACTITIONER

## 2021-11-17 PROCEDURE — 99232 SBSQ HOSP IP/OBS MODERATE 35: CPT | Performed by: INTERNAL MEDICINE

## 2021-11-17 PROCEDURE — 94640 AIRWAY INHALATION TREATMENT: CPT

## 2021-11-17 PROCEDURE — 94760 N-INVAS EAR/PLS OXIMETRY 1: CPT

## 2021-11-17 PROCEDURE — 85730 THROMBOPLASTIN TIME PARTIAL: CPT | Performed by: ANESTHESIOLOGY

## 2021-11-17 PROCEDURE — 99233 SBSQ HOSP IP/OBS HIGH 50: CPT | Performed by: ANESTHESIOLOGY

## 2021-11-17 PROCEDURE — 83735 ASSAY OF MAGNESIUM: CPT | Performed by: NURSE PRACTITIONER

## 2021-11-17 PROCEDURE — 80048 BASIC METABOLIC PNL TOTAL CA: CPT | Performed by: NURSE PRACTITIONER

## 2021-11-17 PROCEDURE — 82948 REAGENT STRIP/BLOOD GLUCOSE: CPT

## 2021-11-17 PROCEDURE — 82330 ASSAY OF CALCIUM: CPT | Performed by: NURSE PRACTITIONER

## 2021-11-17 PROCEDURE — 85730 THROMBOPLASTIN TIME PARTIAL: CPT | Performed by: PHYSICIAN ASSISTANT

## 2021-11-17 PROCEDURE — 94003 VENT MGMT INPAT SUBQ DAY: CPT

## 2021-11-17 PROCEDURE — 85027 COMPLETE CBC AUTOMATED: CPT | Performed by: NURSE PRACTITIONER

## 2021-11-17 RX ORDER — FUROSEMIDE 10 MG/ML
40 INJECTION INTRAMUSCULAR; INTRAVENOUS ONCE
Status: COMPLETED | OUTPATIENT
Start: 2021-11-17 | End: 2021-11-17

## 2021-11-17 RX ADMIN — POTASSIUM CHLORIDE 20 MEQ: 20 SOLUTION ORAL at 08:05

## 2021-11-17 RX ADMIN — LEVALBUTEROL HYDROCHLORIDE 1.25 MG: 1.25 SOLUTION, CONCENTRATE RESPIRATORY (INHALATION) at 07:27

## 2021-11-17 RX ADMIN — HEPARIN SODIUM 20 UNITS/KG/HR: 10000 INJECTION, SOLUTION INTRAVENOUS at 21:03

## 2021-11-17 RX ADMIN — ASPIRIN 81 MG CHEWABLE TABLET 81 MG: 81 TABLET CHEWABLE at 08:05

## 2021-11-17 RX ADMIN — ACETAMINOPHEN 975 MG: 650 SUSPENSION ORAL at 20:59

## 2021-11-17 RX ADMIN — Medication 20 MG: at 06:02

## 2021-11-17 RX ADMIN — FUROSEMIDE 40 MG: 10 SOLUTION ORAL at 16:55

## 2021-11-17 RX ADMIN — AMIODARONE HYDROCHLORIDE 200 MG: 200 TABLET ORAL at 07:32

## 2021-11-17 RX ADMIN — POLYETHYLENE GLYCOL 3350 17 G: 17 POWDER, FOR SOLUTION ORAL at 08:05

## 2021-11-17 RX ADMIN — IPRATROPIUM BROMIDE 0.5 MG: 0.5 SOLUTION RESPIRATORY (INHALATION) at 07:27

## 2021-11-17 RX ADMIN — MELATONIN 6 MG: at 21:00

## 2021-11-17 RX ADMIN — POTASSIUM CHLORIDE 20 MEQ: 20 SOLUTION ORAL at 17:01

## 2021-11-17 RX ADMIN — LOSARTAN POTASSIUM 50 MG: 50 TABLET, FILM COATED ORAL at 08:10

## 2021-11-17 RX ADMIN — FUROSEMIDE 40 MG: 10 SOLUTION ORAL at 07:33

## 2021-11-17 RX ADMIN — ACETAMINOPHEN 975 MG: 650 SUSPENSION ORAL at 05:00

## 2021-11-17 RX ADMIN — FUROSEMIDE 40 MG: 10 INJECTION, SOLUTION INTRAVENOUS at 18:37

## 2021-11-17 RX ADMIN — CHLORHEXIDINE GLUCONATE 15 ML: 1.2 SOLUTION ORAL at 08:05

## 2021-11-17 RX ADMIN — METOPROLOL TARTRATE 25 MG: 25 TABLET, FILM COATED ORAL at 20:59

## 2021-11-17 RX ADMIN — IPRATROPIUM BROMIDE 0.5 MG: 0.5 SOLUTION RESPIRATORY (INHALATION) at 13:26

## 2021-11-17 RX ADMIN — HEPARIN SODIUM 22 UNITS/KG/HR: 10000 INJECTION, SOLUTION INTRAVENOUS at 04:21

## 2021-11-17 RX ADMIN — PREGABALIN 75 MG: 75 CAPSULE ORAL at 08:04

## 2021-11-17 RX ADMIN — ATORVASTATIN CALCIUM 40 MG: 40 TABLET, FILM COATED ORAL at 16:54

## 2021-11-17 RX ADMIN — ACETAMINOPHEN 975 MG: 650 SUSPENSION ORAL at 11:34

## 2021-11-17 RX ADMIN — AMIODARONE HYDROCHLORIDE 200 MG: 200 TABLET ORAL at 16:54

## 2021-11-17 RX ADMIN — HEPARIN SODIUM 4000 UNITS: 1000 INJECTION INTRAVENOUS; SUBCUTANEOUS at 00:05

## 2021-11-17 RX ADMIN — LEVALBUTEROL HYDROCHLORIDE 1.25 MG: 1.25 SOLUTION, CONCENTRATE RESPIRATORY (INHALATION) at 13:26

## 2021-11-17 RX ADMIN — SENNOSIDES AND DOCUSATE SODIUM 2 TABLET: 8.6; 5 TABLET ORAL at 21:01

## 2021-11-17 RX ADMIN — INSULIN HUMAN 35 UNITS: 100 INJECTION, SOLUTION PARENTERAL at 17:00

## 2021-11-17 RX ADMIN — LEVALBUTEROL HYDROCHLORIDE 1.25 MG: 1.25 SOLUTION, CONCENTRATE RESPIRATORY (INHALATION) at 20:18

## 2021-11-17 RX ADMIN — TICAGRELOR 90 MG: 90 TABLET ORAL at 08:05

## 2021-11-17 RX ADMIN — TICAGRELOR 90 MG: 90 TABLET ORAL at 21:00

## 2021-11-17 RX ADMIN — INSULIN HUMAN 35 UNITS: 100 INJECTION, SOLUTION PARENTERAL at 23:27

## 2021-11-17 RX ADMIN — METOPROLOL TARTRATE 25 MG: 25 TABLET, FILM COATED ORAL at 08:05

## 2021-11-17 RX ADMIN — IPRATROPIUM BROMIDE 0.5 MG: 0.5 SOLUTION RESPIRATORY (INHALATION) at 20:18

## 2021-11-17 RX ADMIN — CHLORHEXIDINE GLUCONATE 15 ML: 1.2 SOLUTION ORAL at 20:59

## 2021-11-18 ENCOUNTER — TELEPHONE (OUTPATIENT)
Dept: OTHER | Facility: HOSPITAL | Age: 56
End: 2021-11-18

## 2021-11-18 LAB
APTT PPP: 78 SECONDS (ref 23–37)
GLUCOSE SERPL-MCNC: 164 MG/DL (ref 65–140)
GLUCOSE SERPL-MCNC: 171 MG/DL (ref 65–140)
GLUCOSE SERPL-MCNC: 201 MG/DL (ref 65–140)

## 2021-11-18 PROCEDURE — 82948 REAGENT STRIP/BLOOD GLUCOSE: CPT

## 2021-11-18 PROCEDURE — 85730 THROMBOPLASTIN TIME PARTIAL: CPT | Performed by: PHYSICIAN ASSISTANT

## 2021-11-18 PROCEDURE — 97530 THERAPEUTIC ACTIVITIES: CPT

## 2021-11-18 PROCEDURE — 99232 SBSQ HOSP IP/OBS MODERATE 35: CPT | Performed by: INTERNAL MEDICINE

## 2021-11-18 PROCEDURE — 94640 AIRWAY INHALATION TREATMENT: CPT

## 2021-11-18 PROCEDURE — 99222 1ST HOSP IP/OBS MODERATE 55: CPT | Performed by: PHYSICIAN ASSISTANT

## 2021-11-18 PROCEDURE — 97110 THERAPEUTIC EXERCISES: CPT

## 2021-11-18 PROCEDURE — 94003 VENT MGMT INPAT SUBQ DAY: CPT

## 2021-11-18 PROCEDURE — 97112 NEUROMUSCULAR REEDUCATION: CPT

## 2021-11-18 PROCEDURE — 94760 N-INVAS EAR/PLS OXIMETRY 1: CPT

## 2021-11-18 PROCEDURE — 99233 SBSQ HOSP IP/OBS HIGH 50: CPT | Performed by: ANESTHESIOLOGY

## 2021-11-18 RX ORDER — POLYETHYLENE GLYCOL 3350 17 G/17G
17 POWDER, FOR SOLUTION ORAL DAILY PRN
Status: DISCONTINUED | OUTPATIENT
Start: 2021-11-18 | End: 2021-11-23 | Stop reason: HOSPADM

## 2021-11-18 RX ORDER — ACETAMINOPHEN 160 MG/5ML
975 SUSPENSION, ORAL (FINAL DOSE FORM) ORAL EVERY 6 HOURS PRN
Status: DISCONTINUED | OUTPATIENT
Start: 2021-11-18 | End: 2021-11-23 | Stop reason: HOSPADM

## 2021-11-18 RX ORDER — FUROSEMIDE 10 MG/ML
60 SOLUTION ORAL
Status: DISCONTINUED | OUTPATIENT
Start: 2021-11-18 | End: 2021-11-20

## 2021-11-18 RX ORDER — AMOXICILLIN 250 MG
1 CAPSULE ORAL
Status: DISCONTINUED | OUTPATIENT
Start: 2021-11-18 | End: 2021-11-23 | Stop reason: HOSPADM

## 2021-11-18 RX ADMIN — TICAGRELOR 90 MG: 90 TABLET ORAL at 08:00

## 2021-11-18 RX ADMIN — ASPIRIN 81 MG CHEWABLE TABLET 81 MG: 81 TABLET CHEWABLE at 08:00

## 2021-11-18 RX ADMIN — PREGABALIN 75 MG: 75 CAPSULE ORAL at 08:06

## 2021-11-18 RX ADMIN — AMIODARONE HYDROCHLORIDE 200 MG: 200 TABLET ORAL at 07:48

## 2021-11-18 RX ADMIN — POTASSIUM CHLORIDE 20 MEQ: 20 SOLUTION ORAL at 08:00

## 2021-11-18 RX ADMIN — LOSARTAN POTASSIUM 50 MG: 50 TABLET, FILM COATED ORAL at 08:00

## 2021-11-18 RX ADMIN — INSULIN HUMAN 35 UNITS: 100 INJECTION, SOLUTION PARENTERAL at 05:06

## 2021-11-18 RX ADMIN — FUROSEMIDE 40 MG: 10 SOLUTION ORAL at 07:48

## 2021-11-18 RX ADMIN — LEVALBUTEROL HYDROCHLORIDE 1.25 MG: 1.25 SOLUTION, CONCENTRATE RESPIRATORY (INHALATION) at 13:47

## 2021-11-18 RX ADMIN — IPRATROPIUM BROMIDE 0.5 MG: 0.5 SOLUTION RESPIRATORY (INHALATION) at 08:26

## 2021-11-18 RX ADMIN — AMIODARONE HYDROCHLORIDE 200 MG: 200 TABLET ORAL at 17:10

## 2021-11-18 RX ADMIN — ATORVASTATIN CALCIUM 40 MG: 40 TABLET, FILM COATED ORAL at 17:10

## 2021-11-18 RX ADMIN — CHLORHEXIDINE GLUCONATE 15 ML: 1.2 SOLUTION ORAL at 20:06

## 2021-11-18 RX ADMIN — HEPARIN SODIUM 20 UNITS/KG/HR: 10000 INJECTION, SOLUTION INTRAVENOUS at 13:17

## 2021-11-18 RX ADMIN — Medication 20 MG: at 05:06

## 2021-11-18 RX ADMIN — INSULIN HUMAN 35 UNITS: 100 INJECTION, SOLUTION PARENTERAL at 23:50

## 2021-11-18 RX ADMIN — DOCUSATE SODIUM AND SENNOSIDES 1 TABLET: 8.6; 5 TABLET ORAL at 22:07

## 2021-11-18 RX ADMIN — ACETAMINOPHEN 975 MG: 650 SUSPENSION ORAL at 04:09

## 2021-11-18 RX ADMIN — METOPROLOL TARTRATE 25 MG: 25 TABLET, FILM COATED ORAL at 08:01

## 2021-11-18 RX ADMIN — INSULIN HUMAN 35 UNITS: 100 INJECTION, SOLUTION PARENTERAL at 11:31

## 2021-11-18 RX ADMIN — METOPROLOL TARTRATE 25 MG: 25 TABLET, FILM COATED ORAL at 20:01

## 2021-11-18 RX ADMIN — IPRATROPIUM BROMIDE 0.5 MG: 0.5 SOLUTION RESPIRATORY (INHALATION) at 13:47

## 2021-11-18 RX ADMIN — INSULIN HUMAN 35 UNITS: 100 INJECTION, SOLUTION PARENTERAL at 17:13

## 2021-11-18 RX ADMIN — POTASSIUM CHLORIDE 20 MEQ: 20 SOLUTION ORAL at 17:10

## 2021-11-18 RX ADMIN — IPRATROPIUM BROMIDE 0.5 MG: 0.5 SOLUTION RESPIRATORY (INHALATION) at 19:53

## 2021-11-18 RX ADMIN — MELATONIN 6 MG: at 22:07

## 2021-11-18 RX ADMIN — LEVALBUTEROL HYDROCHLORIDE 1.25 MG: 1.25 SOLUTION, CONCENTRATE RESPIRATORY (INHALATION) at 08:26

## 2021-11-18 RX ADMIN — LEVALBUTEROL HYDROCHLORIDE 1.25 MG: 1.25 SOLUTION, CONCENTRATE RESPIRATORY (INHALATION) at 19:53

## 2021-11-18 RX ADMIN — TICAGRELOR 90 MG: 90 TABLET ORAL at 20:01

## 2021-11-18 RX ADMIN — CHLORHEXIDINE GLUCONATE 15 ML: 1.2 SOLUTION ORAL at 08:00

## 2021-11-19 LAB
APTT PPP: 80 SECONDS (ref 23–37)
BACTERIA BLD CULT: NORMAL
BACTERIA BLD CULT: NORMAL
GLUCOSE SERPL-MCNC: 134 MG/DL (ref 65–140)
GLUCOSE SERPL-MCNC: 165 MG/DL (ref 65–140)
GLUCOSE SERPL-MCNC: 173 MG/DL (ref 65–140)
GLUCOSE SERPL-MCNC: 210 MG/DL (ref 65–140)

## 2021-11-19 PROCEDURE — 82948 REAGENT STRIP/BLOOD GLUCOSE: CPT

## 2021-11-19 PROCEDURE — 85730 THROMBOPLASTIN TIME PARTIAL: CPT | Performed by: PHYSICIAN ASSISTANT

## 2021-11-19 PROCEDURE — 94640 AIRWAY INHALATION TREATMENT: CPT

## 2021-11-19 PROCEDURE — 94760 N-INVAS EAR/PLS OXIMETRY 1: CPT

## 2021-11-19 PROCEDURE — 99232 SBSQ HOSP IP/OBS MODERATE 35: CPT | Performed by: INTERNAL MEDICINE

## 2021-11-19 PROCEDURE — 99231 SBSQ HOSP IP/OBS SF/LOW 25: CPT | Performed by: INTERNAL MEDICINE

## 2021-11-19 PROCEDURE — 99233 SBSQ HOSP IP/OBS HIGH 50: CPT | Performed by: NURSE PRACTITIONER

## 2021-11-19 RX ORDER — FAMOTIDINE 40 MG/5ML
20 POWDER, FOR SUSPENSION ORAL 2 TIMES DAILY
Status: DISCONTINUED | OUTPATIENT
Start: 2021-11-19 | End: 2021-11-23 | Stop reason: HOSPADM

## 2021-11-19 RX ADMIN — HEPARIN SODIUM 20 UNITS/KG/HR: 10000 INJECTION, SOLUTION INTRAVENOUS at 06:29

## 2021-11-19 RX ADMIN — LEVALBUTEROL HYDROCHLORIDE 1.25 MG: 1.25 SOLUTION, CONCENTRATE RESPIRATORY (INHALATION) at 13:45

## 2021-11-19 RX ADMIN — MELATONIN 6 MG: at 21:29

## 2021-11-19 RX ADMIN — CHLORHEXIDINE GLUCONATE 15 ML: 1.2 SOLUTION ORAL at 21:29

## 2021-11-19 RX ADMIN — METOPROLOL TARTRATE 25 MG: 25 TABLET, FILM COATED ORAL at 08:22

## 2021-11-19 RX ADMIN — IPRATROPIUM BROMIDE 0.5 MG: 0.5 SOLUTION RESPIRATORY (INHALATION) at 19:45

## 2021-11-19 RX ADMIN — CHLORHEXIDINE GLUCONATE 15 ML: 1.2 SOLUTION ORAL at 08:22

## 2021-11-19 RX ADMIN — ONDANSETRON 4 MG: 2 INJECTION INTRAMUSCULAR; INTRAVENOUS at 21:40

## 2021-11-19 RX ADMIN — LEVALBUTEROL HYDROCHLORIDE 1.25 MG: 1.25 SOLUTION, CONCENTRATE RESPIRATORY (INHALATION) at 07:26

## 2021-11-19 RX ADMIN — TICAGRELOR 90 MG: 90 TABLET ORAL at 08:22

## 2021-11-19 RX ADMIN — INSULIN HUMAN 35 UNITS: 100 INJECTION, SOLUTION PARENTERAL at 11:34

## 2021-11-19 RX ADMIN — LOSARTAN POTASSIUM 50 MG: 50 TABLET, FILM COATED ORAL at 08:22

## 2021-11-19 RX ADMIN — INSULIN HUMAN 35 UNITS: 100 INJECTION, SOLUTION PARENTERAL at 06:47

## 2021-11-19 RX ADMIN — ATORVASTATIN CALCIUM 40 MG: 40 TABLET, FILM COATED ORAL at 17:00

## 2021-11-19 RX ADMIN — POTASSIUM CHLORIDE 20 MEQ: 20 SOLUTION ORAL at 08:22

## 2021-11-19 RX ADMIN — POTASSIUM CHLORIDE 20 MEQ: 20 SOLUTION ORAL at 17:00

## 2021-11-19 RX ADMIN — FUROSEMIDE 60 MG: 10 SOLUTION ORAL at 08:22

## 2021-11-19 RX ADMIN — ACETAMINOPHEN 975 MG: 650 SUSPENSION ORAL at 06:28

## 2021-11-19 RX ADMIN — FUROSEMIDE 60 MG: 10 SOLUTION ORAL at 17:00

## 2021-11-19 RX ADMIN — LEVALBUTEROL HYDROCHLORIDE 1.25 MG: 1.25 SOLUTION, CONCENTRATE RESPIRATORY (INHALATION) at 19:45

## 2021-11-19 RX ADMIN — INSULIN HUMAN 35 UNITS: 100 INJECTION, SOLUTION PARENTERAL at 17:08

## 2021-11-19 RX ADMIN — FAMOTIDINE 20 MG: 40 POWDER, FOR SUSPENSION ORAL at 17:00

## 2021-11-19 RX ADMIN — IPRATROPIUM BROMIDE 0.5 MG: 0.5 SOLUTION RESPIRATORY (INHALATION) at 07:26

## 2021-11-19 RX ADMIN — Medication 20 MG: at 06:28

## 2021-11-19 RX ADMIN — AMIODARONE HYDROCHLORIDE 200 MG: 200 TABLET ORAL at 08:22

## 2021-11-19 RX ADMIN — PREGABALIN 75 MG: 75 CAPSULE ORAL at 08:22

## 2021-11-19 RX ADMIN — APIXABAN 5 MG: 5 TABLET, FILM COATED ORAL at 17:00

## 2021-11-19 RX ADMIN — DOCUSATE SODIUM AND SENNOSIDES 1 TABLET: 8.6; 5 TABLET ORAL at 21:29

## 2021-11-19 RX ADMIN — AMIODARONE HYDROCHLORIDE 200 MG: 200 TABLET ORAL at 17:00

## 2021-11-19 RX ADMIN — TICAGRELOR 90 MG: 90 TABLET ORAL at 21:29

## 2021-11-19 RX ADMIN — IPRATROPIUM BROMIDE 0.5 MG: 0.5 SOLUTION RESPIRATORY (INHALATION) at 13:45

## 2021-11-19 RX ADMIN — ASPIRIN 81 MG CHEWABLE TABLET 81 MG: 81 TABLET CHEWABLE at 08:22

## 2021-11-19 RX ADMIN — METOPROLOL TARTRATE 25 MG: 25 TABLET, FILM COATED ORAL at 21:29

## 2021-11-20 LAB
ANION GAP SERPL CALCULATED.3IONS-SCNC: 8 MMOL/L (ref 4–13)
APTT PPP: 33 SECONDS (ref 23–37)
BUN SERPL-MCNC: 30 MG/DL (ref 5–25)
CALCIUM SERPL-MCNC: 8.3 MG/DL (ref 8.3–10.1)
CHLORIDE SERPL-SCNC: 102 MMOL/L (ref 100–108)
CO2 SERPL-SCNC: 25 MMOL/L (ref 21–32)
CREAT SERPL-MCNC: 0.78 MG/DL (ref 0.6–1.3)
ERYTHROCYTE [DISTWIDTH] IN BLOOD BY AUTOMATED COUNT: 20 % (ref 11.6–15.1)
GFR SERPL CREATININE-BSD FRML MDRD: 86 ML/MIN/1.73SQ M
GLUCOSE SERPL-MCNC: 140 MG/DL (ref 65–140)
GLUCOSE SERPL-MCNC: 156 MG/DL (ref 65–140)
GLUCOSE SERPL-MCNC: 158 MG/DL (ref 65–140)
GLUCOSE SERPL-MCNC: 177 MG/DL (ref 65–140)
GLUCOSE SERPL-MCNC: 87 MG/DL (ref 65–140)
HCT VFR BLD AUTO: 24.8 % (ref 34.8–46.1)
HGB BLD-MCNC: 7.3 G/DL (ref 11.5–15.4)
MAGNESIUM SERPL-MCNC: 1.7 MG/DL (ref 1.6–2.6)
MCH RBC QN AUTO: 26.6 PG (ref 26.8–34.3)
MCHC RBC AUTO-ENTMCNC: 29.4 G/DL (ref 31.4–37.4)
MCV RBC AUTO: 91 FL (ref 82–98)
PLATELET # BLD AUTO: 276 THOUSANDS/UL (ref 149–390)
PMV BLD AUTO: 11.2 FL (ref 8.9–12.7)
POTASSIUM SERPL-SCNC: 4.8 MMOL/L (ref 3.5–5.3)
RBC # BLD AUTO: 2.74 MILLION/UL (ref 3.81–5.12)
SODIUM SERPL-SCNC: 135 MMOL/L (ref 136–145)
WBC # BLD AUTO: 11.41 THOUSAND/UL (ref 4.31–10.16)

## 2021-11-20 PROCEDURE — 83735 ASSAY OF MAGNESIUM: CPT | Performed by: NURSE PRACTITIONER

## 2021-11-20 PROCEDURE — 94640 AIRWAY INHALATION TREATMENT: CPT

## 2021-11-20 PROCEDURE — 80048 BASIC METABOLIC PNL TOTAL CA: CPT | Performed by: NURSE PRACTITIONER

## 2021-11-20 PROCEDURE — 85027 COMPLETE CBC AUTOMATED: CPT | Performed by: NURSE PRACTITIONER

## 2021-11-20 PROCEDURE — 99232 SBSQ HOSP IP/OBS MODERATE 35: CPT | Performed by: INTERNAL MEDICINE

## 2021-11-20 PROCEDURE — 82948 REAGENT STRIP/BLOOD GLUCOSE: CPT

## 2021-11-20 PROCEDURE — 85730 THROMBOPLASTIN TIME PARTIAL: CPT | Performed by: ANESTHESIOLOGY

## 2021-11-20 PROCEDURE — 99233 SBSQ HOSP IP/OBS HIGH 50: CPT | Performed by: ANESTHESIOLOGY

## 2021-11-20 PROCEDURE — 94760 N-INVAS EAR/PLS OXIMETRY 1: CPT

## 2021-11-20 PROCEDURE — 94003 VENT MGMT INPAT SUBQ DAY: CPT

## 2021-11-20 RX ORDER — FUROSEMIDE 10 MG/ML
40 SOLUTION ORAL
Status: DISCONTINUED | OUTPATIENT
Start: 2021-11-21 | End: 2021-11-23 | Stop reason: HOSPADM

## 2021-11-20 RX ORDER — POTASSIUM CHLORIDE 20MEQ/15ML
20 LIQUID (ML) ORAL DAILY
Status: DISCONTINUED | OUTPATIENT
Start: 2021-11-21 | End: 2021-11-23 | Stop reason: HOSPADM

## 2021-11-20 RX ORDER — MAGNESIUM SULFATE HEPTAHYDRATE 40 MG/ML
2 INJECTION, SOLUTION INTRAVENOUS ONCE
Status: COMPLETED | OUTPATIENT
Start: 2021-11-20 | End: 2021-11-20

## 2021-11-20 RX ADMIN — FAMOTIDINE 20 MG: 40 POWDER, FOR SUSPENSION ORAL at 08:05

## 2021-11-20 RX ADMIN — FUROSEMIDE 60 MG: 10 SOLUTION ORAL at 08:05

## 2021-11-20 RX ADMIN — CHLORHEXIDINE GLUCONATE 15 ML: 1.2 SOLUTION ORAL at 08:05

## 2021-11-20 RX ADMIN — INSULIN HUMAN 35 UNITS: 100 INJECTION, SOLUTION PARENTERAL at 05:35

## 2021-11-20 RX ADMIN — IPRATROPIUM BROMIDE 0.5 MG: 0.5 SOLUTION RESPIRATORY (INHALATION) at 07:55

## 2021-11-20 RX ADMIN — METOPROLOL TARTRATE 25 MG: 25 TABLET, FILM COATED ORAL at 08:05

## 2021-11-20 RX ADMIN — IPRATROPIUM BROMIDE 0.5 MG: 0.5 SOLUTION RESPIRATORY (INHALATION) at 19:55

## 2021-11-20 RX ADMIN — APIXABAN 5 MG: 5 TABLET, FILM COATED ORAL at 08:05

## 2021-11-20 RX ADMIN — ATORVASTATIN CALCIUM 40 MG: 40 TABLET, FILM COATED ORAL at 17:00

## 2021-11-20 RX ADMIN — LEVALBUTEROL HYDROCHLORIDE 1.25 MG: 1.25 SOLUTION, CONCENTRATE RESPIRATORY (INHALATION) at 13:56

## 2021-11-20 RX ADMIN — LEVALBUTEROL HYDROCHLORIDE 1.25 MG: 1.25 SOLUTION, CONCENTRATE RESPIRATORY (INHALATION) at 19:55

## 2021-11-20 RX ADMIN — LEVALBUTEROL HYDROCHLORIDE 1.25 MG: 1.25 SOLUTION, CONCENTRATE RESPIRATORY (INHALATION) at 07:55

## 2021-11-20 RX ADMIN — MAGNESIUM SULFATE HEPTAHYDRATE 2 G: 40 INJECTION, SOLUTION INTRAVENOUS at 13:35

## 2021-11-20 RX ADMIN — IPRATROPIUM BROMIDE 0.5 MG: 0.5 SOLUTION RESPIRATORY (INHALATION) at 13:56

## 2021-11-20 RX ADMIN — INSULIN HUMAN 30 UNITS: 100 INJECTION, SOLUTION PARENTERAL at 12:09

## 2021-11-20 RX ADMIN — LOSARTAN POTASSIUM 50 MG: 50 TABLET, FILM COATED ORAL at 08:05

## 2021-11-20 RX ADMIN — ASPIRIN 81 MG CHEWABLE TABLET 81 MG: 81 TABLET CHEWABLE at 08:05

## 2021-11-20 RX ADMIN — FAMOTIDINE 20 MG: 40 POWDER, FOR SUSPENSION ORAL at 17:00

## 2021-11-20 RX ADMIN — PREGABALIN 75 MG: 75 CAPSULE ORAL at 08:05

## 2021-11-20 RX ADMIN — MELATONIN 6 MG: at 22:10

## 2021-11-20 RX ADMIN — POTASSIUM CHLORIDE 20 MEQ: 20 SOLUTION ORAL at 08:05

## 2021-11-20 RX ADMIN — INSULIN HUMAN 30 UNITS: 100 INJECTION, SOLUTION PARENTERAL at 17:00

## 2021-11-20 RX ADMIN — TICAGRELOR 90 MG: 90 TABLET ORAL at 08:05

## 2021-11-20 RX ADMIN — CHLORHEXIDINE GLUCONATE 15 ML: 1.2 SOLUTION ORAL at 20:12

## 2021-11-20 RX ADMIN — AMIODARONE HYDROCHLORIDE 200 MG: 200 TABLET ORAL at 17:00

## 2021-11-20 RX ADMIN — AMIODARONE HYDROCHLORIDE 200 MG: 200 TABLET ORAL at 08:05

## 2021-11-20 RX ADMIN — DOCUSATE SODIUM AND SENNOSIDES 1 TABLET: 8.6; 5 TABLET ORAL at 22:10

## 2021-11-20 RX ADMIN — APIXABAN 5 MG: 5 TABLET, FILM COATED ORAL at 17:00

## 2021-11-20 RX ADMIN — TICAGRELOR 90 MG: 90 TABLET ORAL at 20:12

## 2021-11-21 LAB
ABO GROUP BLD: NORMAL
ANION GAP SERPL CALCULATED.3IONS-SCNC: 7 MMOL/L (ref 4–13)
BLD GP AB SCN SERPL QL: NEGATIVE
BUN SERPL-MCNC: 30 MG/DL (ref 5–25)
CALCIUM SERPL-MCNC: 8.3 MG/DL (ref 8.3–10.1)
CHLORIDE SERPL-SCNC: 99 MMOL/L (ref 100–108)
CO2 SERPL-SCNC: 27 MMOL/L (ref 21–32)
CREAT SERPL-MCNC: 0.77 MG/DL (ref 0.6–1.3)
ERYTHROCYTE [DISTWIDTH] IN BLOOD BY AUTOMATED COUNT: 20.3 % (ref 11.6–15.1)
FLUAV RNA RESP QL NAA+PROBE: NEGATIVE
FLUBV RNA RESP QL NAA+PROBE: NEGATIVE
GFR SERPL CREATININE-BSD FRML MDRD: 87 ML/MIN/1.73SQ M
GLUCOSE SERPL-MCNC: 135 MG/DL (ref 65–140)
GLUCOSE SERPL-MCNC: 177 MG/DL (ref 65–140)
GLUCOSE SERPL-MCNC: 178 MG/DL (ref 65–140)
GLUCOSE SERPL-MCNC: 212 MG/DL (ref 65–140)
GLUCOSE SERPL-MCNC: 216 MG/DL (ref 65–140)
HCT VFR BLD AUTO: 23.1 % (ref 34.8–46.1)
HGB BLD-MCNC: 6.9 G/DL (ref 11.5–15.4)
MAGNESIUM SERPL-MCNC: 2.2 MG/DL (ref 1.6–2.6)
MCH RBC QN AUTO: 26.8 PG (ref 26.8–34.3)
MCHC RBC AUTO-ENTMCNC: 29.9 G/DL (ref 31.4–37.4)
MCV RBC AUTO: 90 FL (ref 82–98)
PLATELET # BLD AUTO: 274 THOUSANDS/UL (ref 149–390)
PMV BLD AUTO: 11.8 FL (ref 8.9–12.7)
POTASSIUM SERPL-SCNC: 4.4 MMOL/L (ref 3.5–5.3)
RBC # BLD AUTO: 2.57 MILLION/UL (ref 3.81–5.12)
RH BLD: POSITIVE
RSV RNA RESP QL NAA+PROBE: NEGATIVE
SARS-COV-2 RNA RESP QL NAA+PROBE: NEGATIVE
SODIUM SERPL-SCNC: 133 MMOL/L (ref 136–145)
SPECIMEN EXPIRATION DATE: NORMAL
WBC # BLD AUTO: 10.91 THOUSAND/UL (ref 4.31–10.16)

## 2021-11-21 PROCEDURE — P9016 RBC LEUKOCYTES REDUCED: HCPCS

## 2021-11-21 PROCEDURE — 94760 N-INVAS EAR/PLS OXIMETRY 1: CPT

## 2021-11-21 PROCEDURE — 82948 REAGENT STRIP/BLOOD GLUCOSE: CPT

## 2021-11-21 PROCEDURE — 80048 BASIC METABOLIC PNL TOTAL CA: CPT | Performed by: PHYSICIAN ASSISTANT

## 2021-11-21 PROCEDURE — 86850 RBC ANTIBODY SCREEN: CPT | Performed by: PHYSICIAN ASSISTANT

## 2021-11-21 PROCEDURE — 85027 COMPLETE CBC AUTOMATED: CPT | Performed by: PHYSICIAN ASSISTANT

## 2021-11-21 PROCEDURE — 83735 ASSAY OF MAGNESIUM: CPT | Performed by: PHYSICIAN ASSISTANT

## 2021-11-21 PROCEDURE — 86901 BLOOD TYPING SEROLOGIC RH(D): CPT | Performed by: PHYSICIAN ASSISTANT

## 2021-11-21 PROCEDURE — 94003 VENT MGMT INPAT SUBQ DAY: CPT

## 2021-11-21 PROCEDURE — 0241U HB NFCT DS VIR RESP RNA 4 TRGT: CPT | Performed by: PHYSICIAN ASSISTANT

## 2021-11-21 PROCEDURE — 86923 COMPATIBILITY TEST ELECTRIC: CPT

## 2021-11-21 PROCEDURE — 94640 AIRWAY INHALATION TREATMENT: CPT

## 2021-11-21 PROCEDURE — 99233 SBSQ HOSP IP/OBS HIGH 50: CPT | Performed by: ANESTHESIOLOGY

## 2021-11-21 PROCEDURE — 86900 BLOOD TYPING SEROLOGIC ABO: CPT | Performed by: PHYSICIAN ASSISTANT

## 2021-11-21 RX ORDER — SODIUM CHLORIDE, SODIUM GLUCONATE, SODIUM ACETATE, POTASSIUM CHLORIDE, MAGNESIUM CHLORIDE, SODIUM PHOSPHATE, DIBASIC, AND POTASSIUM PHOSPHATE .53; .5; .37; .037; .03; .012; .00082 G/100ML; G/100ML; G/100ML; G/100ML; G/100ML; G/100ML; G/100ML
250 INJECTION, SOLUTION INTRAVENOUS ONCE
Status: COMPLETED | OUTPATIENT
Start: 2021-11-21 | End: 2021-11-21

## 2021-11-21 RX ORDER — QUETIAPINE FUMARATE 25 MG/1
25 TABLET, FILM COATED ORAL
Status: DISCONTINUED | OUTPATIENT
Start: 2021-11-21 | End: 2021-11-23 | Stop reason: HOSPADM

## 2021-11-21 RX ADMIN — CHLORHEXIDINE GLUCONATE 15 ML: 1.2 SOLUTION ORAL at 21:11

## 2021-11-21 RX ADMIN — TICAGRELOR 90 MG: 90 TABLET ORAL at 08:12

## 2021-11-21 RX ADMIN — FUROSEMIDE 40 MG: 10 SOLUTION ORAL at 17:00

## 2021-11-21 RX ADMIN — AMIODARONE HYDROCHLORIDE 200 MG: 200 TABLET ORAL at 17:01

## 2021-11-21 RX ADMIN — LEVALBUTEROL HYDROCHLORIDE 1.25 MG: 1.25 SOLUTION, CONCENTRATE RESPIRATORY (INHALATION) at 14:15

## 2021-11-21 RX ADMIN — LEVALBUTEROL HYDROCHLORIDE 1.25 MG: 1.25 SOLUTION, CONCENTRATE RESPIRATORY (INHALATION) at 08:13

## 2021-11-21 RX ADMIN — IPRATROPIUM BROMIDE 0.5 MG: 0.5 SOLUTION RESPIRATORY (INHALATION) at 20:08

## 2021-11-21 RX ADMIN — INSULIN HUMAN 30 UNITS: 100 INJECTION, SOLUTION PARENTERAL at 05:42

## 2021-11-21 RX ADMIN — DOCUSATE SODIUM AND SENNOSIDES 1 TABLET: 8.6; 5 TABLET ORAL at 21:11

## 2021-11-21 RX ADMIN — QUETIAPINE FUMARATE 25 MG: 25 TABLET ORAL at 21:12

## 2021-11-21 RX ADMIN — Medication 20 MEQ: at 08:12

## 2021-11-21 RX ADMIN — FAMOTIDINE 20 MG: 40 POWDER, FOR SUSPENSION ORAL at 08:12

## 2021-11-21 RX ADMIN — AMIODARONE HYDROCHLORIDE 200 MG: 200 TABLET ORAL at 08:12

## 2021-11-21 RX ADMIN — APIXABAN 5 MG: 5 TABLET, FILM COATED ORAL at 17:01

## 2021-11-21 RX ADMIN — INSULIN HUMAN 30 UNITS: 100 INJECTION, SOLUTION PARENTERAL at 11:10

## 2021-11-21 RX ADMIN — IPRATROPIUM BROMIDE 0.5 MG: 0.5 SOLUTION RESPIRATORY (INHALATION) at 14:15

## 2021-11-21 RX ADMIN — INSULIN HUMAN 30 UNITS: 100 INJECTION, SOLUTION PARENTERAL at 17:05

## 2021-11-21 RX ADMIN — METOPROLOL TARTRATE 25 MG: 25 TABLET, FILM COATED ORAL at 21:13

## 2021-11-21 RX ADMIN — MELATONIN 6 MG: at 21:12

## 2021-11-21 RX ADMIN — CHLORHEXIDINE GLUCONATE 15 ML: 1.2 SOLUTION ORAL at 08:12

## 2021-11-21 RX ADMIN — FAMOTIDINE 20 MG: 40 POWDER, FOR SUSPENSION ORAL at 17:01

## 2021-11-21 RX ADMIN — PREGABALIN 75 MG: 75 CAPSULE ORAL at 08:12

## 2021-11-21 RX ADMIN — LEVALBUTEROL HYDROCHLORIDE 1.25 MG: 1.25 SOLUTION, CONCENTRATE RESPIRATORY (INHALATION) at 20:08

## 2021-11-21 RX ADMIN — INSULIN HUMAN 30 UNITS: 100 INJECTION, SOLUTION PARENTERAL at 23:40

## 2021-11-21 RX ADMIN — ATORVASTATIN CALCIUM 40 MG: 40 TABLET, FILM COATED ORAL at 17:00

## 2021-11-21 RX ADMIN — SODIUM CHLORIDE, SODIUM GLUCONATE, SODIUM ACETATE, POTASSIUM CHLORIDE, MAGNESIUM CHLORIDE, SODIUM PHOSPHATE, DIBASIC, AND POTASSIUM PHOSPHATE 250 ML: .53; .5; .37; .037; .03; .012; .00082 INJECTION, SOLUTION INTRAVENOUS at 04:38

## 2021-11-21 RX ADMIN — IPRATROPIUM BROMIDE 0.5 MG: 0.5 SOLUTION RESPIRATORY (INHALATION) at 08:12

## 2021-11-21 RX ADMIN — TICAGRELOR 90 MG: 90 TABLET ORAL at 21:12

## 2021-11-21 RX ADMIN — ASPIRIN 81 MG CHEWABLE TABLET 81 MG: 81 TABLET CHEWABLE at 08:12

## 2021-11-21 RX ADMIN — APIXABAN 5 MG: 5 TABLET, FILM COATED ORAL at 08:12

## 2021-11-21 RX ADMIN — INSULIN HUMAN 30 UNITS: 100 INJECTION, SOLUTION PARENTERAL at 00:11

## 2021-11-21 RX ADMIN — FUROSEMIDE 40 MG: 10 SOLUTION ORAL at 08:14

## 2021-11-22 LAB
ABO GROUP BLD BPU: NORMAL
ANION GAP SERPL CALCULATED.3IONS-SCNC: 8 MMOL/L (ref 4–13)
BPU ID: NORMAL
BUN SERPL-MCNC: 29 MG/DL (ref 5–25)
CALCIUM SERPL-MCNC: 8.1 MG/DL (ref 8.3–10.1)
CHLORIDE SERPL-SCNC: 100 MMOL/L (ref 100–108)
CO2 SERPL-SCNC: 27 MMOL/L (ref 21–32)
CREAT SERPL-MCNC: 0.74 MG/DL (ref 0.6–1.3)
CROSSMATCH: NORMAL
GFR SERPL CREATININE-BSD FRML MDRD: 91 ML/MIN/1.73SQ M
GLUCOSE SERPL-MCNC: 146 MG/DL (ref 65–140)
GLUCOSE SERPL-MCNC: 147 MG/DL (ref 65–140)
GLUCOSE SERPL-MCNC: 154 MG/DL (ref 65–140)
GLUCOSE SERPL-MCNC: 163 MG/DL (ref 65–140)
GLUCOSE SERPL-MCNC: 169 MG/DL (ref 65–140)
HGB BLD-MCNC: 7.8 G/DL (ref 11.5–15.4)
POTASSIUM SERPL-SCNC: 4 MMOL/L (ref 3.5–5.3)
SODIUM SERPL-SCNC: 135 MMOL/L (ref 136–145)
UNIT DISPENSE STATUS: NORMAL
UNIT PRODUCT CODE: NORMAL
UNIT PRODUCT VOLUME: 350 ML
UNIT RH: NORMAL

## 2021-11-22 PROCEDURE — 97530 THERAPEUTIC ACTIVITIES: CPT

## 2021-11-22 PROCEDURE — 82948 REAGENT STRIP/BLOOD GLUCOSE: CPT

## 2021-11-22 PROCEDURE — 94760 N-INVAS EAR/PLS OXIMETRY 1: CPT

## 2021-11-22 PROCEDURE — U0005 INFEC AGEN DETEC AMPLI PROBE: HCPCS | Performed by: PHYSICIAN ASSISTANT

## 2021-11-22 PROCEDURE — 99233 SBSQ HOSP IP/OBS HIGH 50: CPT | Performed by: ANESTHESIOLOGY

## 2021-11-22 PROCEDURE — 99232 SBSQ HOSP IP/OBS MODERATE 35: CPT | Performed by: INTERNAL MEDICINE

## 2021-11-22 PROCEDURE — 94003 VENT MGMT INPAT SUBQ DAY: CPT

## 2021-11-22 PROCEDURE — 94640 AIRWAY INHALATION TREATMENT: CPT

## 2021-11-22 PROCEDURE — U0003 INFECTIOUS AGENT DETECTION BY NUCLEIC ACID (DNA OR RNA); SEVERE ACUTE RESPIRATORY SYNDROME CORONAVIRUS 2 (SARS-COV-2) (CORONAVIRUS DISEASE [COVID-19]), AMPLIFIED PROBE TECHNIQUE, MAKING USE OF HIGH THROUGHPUT TECHNOLOGIES AS DESCRIBED BY CMS-2020-01-R: HCPCS | Performed by: PHYSICIAN ASSISTANT

## 2021-11-22 PROCEDURE — 80048 BASIC METABOLIC PNL TOTAL CA: CPT | Performed by: PHYSICIAN ASSISTANT

## 2021-11-22 PROCEDURE — 97110 THERAPEUTIC EXERCISES: CPT

## 2021-11-22 PROCEDURE — 85018 HEMOGLOBIN: CPT | Performed by: PHYSICIAN ASSISTANT

## 2021-11-22 RX ADMIN — INSULIN HUMAN 30 UNITS: 100 INJECTION, SOLUTION PARENTERAL at 05:16

## 2021-11-22 RX ADMIN — LEVALBUTEROL HYDROCHLORIDE 1.25 MG: 1.25 SOLUTION, CONCENTRATE RESPIRATORY (INHALATION) at 19:00

## 2021-11-22 RX ADMIN — INSULIN HUMAN 30 UNITS: 100 INJECTION, SOLUTION PARENTERAL at 11:49

## 2021-11-22 RX ADMIN — QUETIAPINE FUMARATE 25 MG: 25 TABLET ORAL at 21:17

## 2021-11-22 RX ADMIN — FUROSEMIDE 40 MG: 10 SOLUTION ORAL at 16:35

## 2021-11-22 RX ADMIN — FUROSEMIDE 40 MG: 10 SOLUTION ORAL at 09:03

## 2021-11-22 RX ADMIN — CHLORHEXIDINE GLUCONATE 15 ML: 1.2 SOLUTION ORAL at 21:18

## 2021-11-22 RX ADMIN — FAMOTIDINE 20 MG: 40 POWDER, FOR SUSPENSION ORAL at 17:22

## 2021-11-22 RX ADMIN — METOPROLOL TARTRATE 25 MG: 25 TABLET, FILM COATED ORAL at 09:03

## 2021-11-22 RX ADMIN — FAMOTIDINE 20 MG: 40 POWDER, FOR SUSPENSION ORAL at 09:03

## 2021-11-22 RX ADMIN — LEVALBUTEROL HYDROCHLORIDE 1.25 MG: 1.25 SOLUTION, CONCENTRATE RESPIRATORY (INHALATION) at 13:05

## 2021-11-22 RX ADMIN — AMIODARONE HYDROCHLORIDE 200 MG: 200 TABLET ORAL at 16:32

## 2021-11-22 RX ADMIN — TICAGRELOR 90 MG: 90 TABLET ORAL at 09:03

## 2021-11-22 RX ADMIN — TICAGRELOR 90 MG: 90 TABLET ORAL at 21:17

## 2021-11-22 RX ADMIN — APIXABAN 5 MG: 5 TABLET, FILM COATED ORAL at 17:22

## 2021-11-22 RX ADMIN — METOPROLOL TARTRATE 25 MG: 25 TABLET, FILM COATED ORAL at 21:17

## 2021-11-22 RX ADMIN — CHLORHEXIDINE GLUCONATE 15 ML: 1.2 SOLUTION ORAL at 09:02

## 2021-11-22 RX ADMIN — AMIODARONE HYDROCHLORIDE 200 MG: 200 TABLET ORAL at 09:02

## 2021-11-22 RX ADMIN — MELATONIN 6 MG: at 21:18

## 2021-11-22 RX ADMIN — IPRATROPIUM BROMIDE 0.5 MG: 0.5 SOLUTION RESPIRATORY (INHALATION) at 19:00

## 2021-11-22 RX ADMIN — LEVALBUTEROL HYDROCHLORIDE 1.25 MG: 1.25 SOLUTION, CONCENTRATE RESPIRATORY (INHALATION) at 07:44

## 2021-11-22 RX ADMIN — APIXABAN 5 MG: 5 TABLET, FILM COATED ORAL at 09:03

## 2021-11-22 RX ADMIN — IPRATROPIUM BROMIDE 0.5 MG: 0.5 SOLUTION RESPIRATORY (INHALATION) at 13:05

## 2021-11-22 RX ADMIN — PREGABALIN 75 MG: 75 CAPSULE ORAL at 09:19

## 2021-11-22 RX ADMIN — Medication 20 MEQ: at 09:02

## 2021-11-22 RX ADMIN — IPRATROPIUM BROMIDE 0.5 MG: 0.5 SOLUTION RESPIRATORY (INHALATION) at 07:44

## 2021-11-22 RX ADMIN — INSULIN HUMAN 30 UNITS: 100 INJECTION, SOLUTION PARENTERAL at 17:22

## 2021-11-22 RX ADMIN — ACETAMINOPHEN 975 MG: 650 SUSPENSION ORAL at 05:16

## 2021-11-22 RX ADMIN — ATORVASTATIN CALCIUM 40 MG: 40 TABLET, FILM COATED ORAL at 16:32

## 2021-11-22 RX ADMIN — ASPIRIN 81 MG CHEWABLE TABLET 81 MG: 81 TABLET CHEWABLE at 09:03

## 2021-11-23 VITALS
HEIGHT: 69 IN | BODY MASS INDEX: 28.57 KG/M2 | HEART RATE: 75 BPM | SYSTOLIC BLOOD PRESSURE: 98 MMHG | TEMPERATURE: 98.8 F | RESPIRATION RATE: 27 BRPM | OXYGEN SATURATION: 98 % | DIASTOLIC BLOOD PRESSURE: 62 MMHG | WEIGHT: 192.9 LBS

## 2021-11-23 LAB
FLUAV RNA RESP QL NAA+PROBE: NEGATIVE
FLUBV RNA RESP QL NAA+PROBE: NEGATIVE
GLUCOSE SERPL-MCNC: 190 MG/DL (ref 65–140)
GLUCOSE SERPL-MCNC: 194 MG/DL (ref 65–140)
GLUCOSE SERPL-MCNC: 93 MG/DL (ref 65–140)
RSV RNA RESP QL NAA+PROBE: NEGATIVE
SARS-COV-2 RNA RESP QL NAA+PROBE: NEGATIVE

## 2021-11-23 PROCEDURE — 94003 VENT MGMT INPAT SUBQ DAY: CPT

## 2021-11-23 PROCEDURE — NC001 PR NO CHARGE: Performed by: PHYSICIAN ASSISTANT

## 2021-11-23 PROCEDURE — 99232 SBSQ HOSP IP/OBS MODERATE 35: CPT | Performed by: INTERNAL MEDICINE

## 2021-11-23 PROCEDURE — 99238 HOSP IP/OBS DSCHRG MGMT 30/<: CPT | Performed by: PHYSICIAN ASSISTANT

## 2021-11-23 PROCEDURE — 94760 N-INVAS EAR/PLS OXIMETRY 1: CPT

## 2021-11-23 PROCEDURE — 0241U HB NFCT DS VIR RESP RNA 4 TRGT: CPT | Performed by: PHYSICIAN ASSISTANT

## 2021-11-23 PROCEDURE — 94640 AIRWAY INHALATION TREATMENT: CPT

## 2021-11-23 PROCEDURE — 82948 REAGENT STRIP/BLOOD GLUCOSE: CPT

## 2021-11-23 RX ORDER — POTASSIUM CHLORIDE 20MEQ/15ML
20 LIQUID (ML) ORAL DAILY
Refills: 0
Start: 2021-11-24 | End: 2022-03-28 | Stop reason: HOSPADM

## 2021-11-23 RX ORDER — ACETAMINOPHEN 160 MG/5ML
975 SUSPENSION, ORAL (FINAL DOSE FORM) ORAL EVERY 6 HOURS PRN
Refills: 0 | Status: ON HOLD
Start: 2021-11-23

## 2021-11-23 RX ORDER — LOSARTAN POTASSIUM 25 MG/1
25 TABLET ORAL DAILY
Status: DISCONTINUED | OUTPATIENT
Start: 2021-11-23 | End: 2021-11-23 | Stop reason: HOSPADM

## 2021-11-23 RX ORDER — CHLORHEXIDINE GLUCONATE 0.12 MG/ML
15 RINSE ORAL EVERY 12 HOURS SCHEDULED
Qty: 120 ML | Refills: 0 | Status: ON HOLD
Start: 2021-11-23

## 2021-11-23 RX ORDER — FUROSEMIDE 10 MG/ML
40 SOLUTION ORAL 2 TIMES DAILY
Refills: 0
Start: 2021-11-23 | End: 2022-03-03 | Stop reason: HOSPADM

## 2021-11-23 RX ORDER — LOSARTAN POTASSIUM 25 MG/1
25 TABLET ORAL DAILY
Refills: 0
Start: 2021-11-24 | End: 2022-03-03 | Stop reason: HOSPADM

## 2021-11-23 RX ORDER — OXYCODONE HYDROCHLORIDE 5 MG/1
5 TABLET ORAL EVERY 4 HOURS PRN
Refills: 0 | Status: SHIPPED | OUTPATIENT
Start: 2021-11-23 | End: 2021-12-03

## 2021-11-23 RX ORDER — AMIODARONE HYDROCHLORIDE 200 MG/1
200 TABLET ORAL 2 TIMES DAILY WITH MEALS
Refills: 0
Start: 2021-11-23 | End: 2022-03-03 | Stop reason: HOSPADM

## 2021-11-23 RX ORDER — AMOXICILLIN 250 MG
1 CAPSULE ORAL
Refills: 0 | Status: ON HOLD
Start: 2021-11-23

## 2021-11-23 RX ORDER — POLYETHYLENE GLYCOL 3350 17 G/17G
17 POWDER, FOR SOLUTION ORAL DAILY
Refills: 0 | Status: ON HOLD
Start: 2021-11-23

## 2021-11-23 RX ORDER — LEVALBUTEROL 1.25 MG/.5ML
1.25 SOLUTION, CONCENTRATE RESPIRATORY (INHALATION)
Refills: 0 | Status: ON HOLD
Start: 2021-11-23

## 2021-11-23 RX ORDER — ONDANSETRON 2 MG/ML
4 INJECTION INTRAMUSCULAR; INTRAVENOUS EVERY 6 HOURS PRN
Refills: 0
Start: 2021-11-23 | End: 2022-03-28 | Stop reason: HOSPADM

## 2021-11-23 RX ORDER — ALBUTEROL SULFATE 2.5 MG/3ML
2.5 SOLUTION RESPIRATORY (INHALATION) EVERY 4 HOURS PRN
Refills: 0
Start: 2021-11-23 | End: 2022-04-07 | Stop reason: SDUPTHER

## 2021-11-23 RX ORDER — LANOLIN ALCOHOL/MO/W.PET/CERES
6 CREAM (GRAM) TOPICAL
Refills: 0
Start: 2021-11-23 | End: 2022-03-15 | Stop reason: SDUPTHER

## 2021-11-23 RX ORDER — FAMOTIDINE 40 MG/5ML
20 POWDER, FOR SUSPENSION ORAL 2 TIMES DAILY
Refills: 0 | Status: ON HOLD
Start: 2021-11-23

## 2021-11-23 RX ORDER — QUETIAPINE FUMARATE 25 MG/1
25 TABLET, FILM COATED ORAL
Refills: 0
Start: 2021-11-23 | End: 2022-03-14 | Stop reason: ALTCHOICE

## 2021-11-23 RX ORDER — OXYCODONE HYDROCHLORIDE 5 MG/1
2.5 TABLET ORAL EVERY 4 HOURS PRN
Refills: 0 | Status: SHIPPED | OUTPATIENT
Start: 2021-11-23 | End: 2021-12-03

## 2021-11-23 RX ADMIN — TICAGRELOR 90 MG: 90 TABLET ORAL at 08:18

## 2021-11-23 RX ADMIN — CHLORHEXIDINE GLUCONATE 15 ML: 1.2 SOLUTION ORAL at 08:18

## 2021-11-23 RX ADMIN — APIXABAN 5 MG: 5 TABLET, FILM COATED ORAL at 08:18

## 2021-11-23 RX ADMIN — AMIODARONE HYDROCHLORIDE 200 MG: 200 TABLET ORAL at 08:18

## 2021-11-23 RX ADMIN — LEVALBUTEROL HYDROCHLORIDE 1.25 MG: 1.25 SOLUTION, CONCENTRATE RESPIRATORY (INHALATION) at 07:49

## 2021-11-23 RX ADMIN — IPRATROPIUM BROMIDE 0.5 MG: 0.5 SOLUTION RESPIRATORY (INHALATION) at 13:36

## 2021-11-23 RX ADMIN — INSULIN HUMAN 30 UNITS: 100 INJECTION, SOLUTION PARENTERAL at 06:23

## 2021-11-23 RX ADMIN — PREGABALIN 75 MG: 75 CAPSULE ORAL at 08:18

## 2021-11-23 RX ADMIN — INSULIN HUMAN 30 UNITS: 100 INJECTION, SOLUTION PARENTERAL at 11:57

## 2021-11-23 RX ADMIN — FAMOTIDINE 20 MG: 40 POWDER, FOR SUSPENSION ORAL at 08:19

## 2021-11-23 RX ADMIN — METOPROLOL TARTRATE 25 MG: 25 TABLET, FILM COATED ORAL at 08:18

## 2021-11-23 RX ADMIN — FUROSEMIDE 40 MG: 10 SOLUTION ORAL at 08:18

## 2021-11-23 RX ADMIN — Medication 20 MEQ: at 08:20

## 2021-11-23 RX ADMIN — LEVALBUTEROL HYDROCHLORIDE 1.25 MG: 1.25 SOLUTION, CONCENTRATE RESPIRATORY (INHALATION) at 13:36

## 2021-11-23 RX ADMIN — IPRATROPIUM BROMIDE 0.5 MG: 0.5 SOLUTION RESPIRATORY (INHALATION) at 07:49

## 2021-11-24 ENCOUNTER — TRANSITIONAL CARE MANAGEMENT (OUTPATIENT)
Dept: FAMILY MEDICINE CLINIC | Facility: CLINIC | Age: 56
End: 2021-11-24

## 2021-11-24 ENCOUNTER — TELEPHONE (OUTPATIENT)
Dept: NEUROLOGY | Facility: CLINIC | Age: 56
End: 2021-11-24

## 2021-11-24 LAB — SARS-COV-2 RNA RESP QL NAA+PROBE: NEGATIVE

## 2021-12-13 ENCOUNTER — TELEPHONE (OUTPATIENT)
Dept: OTHER | Facility: OTHER | Age: 56
End: 2021-12-13

## 2022-02-12 ENCOUNTER — APPOINTMENT (EMERGENCY)
Dept: RADIOLOGY | Facility: HOSPITAL | Age: 57
End: 2022-02-12
Payer: MEDICARE

## 2022-02-12 ENCOUNTER — HOSPITAL ENCOUNTER (EMERGENCY)
Facility: HOSPITAL | Age: 57
Discharge: HOME | End: 2022-02-12
Attending: INTERNAL MEDICINE
Payer: MEDICARE

## 2022-02-12 VITALS
SYSTOLIC BLOOD PRESSURE: 153 MMHG | DIASTOLIC BLOOD PRESSURE: 73 MMHG | HEART RATE: 78 BPM | OXYGEN SATURATION: 100 % | RESPIRATION RATE: 18 BRPM

## 2022-02-12 DIAGNOSIS — Z98.890 H/O TRACHEOSTOMY: ICD-10-CM

## 2022-02-12 DIAGNOSIS — R06.00 DYSPNEA, UNSPECIFIED TYPE: Primary | ICD-10-CM

## 2022-02-12 DIAGNOSIS — F41.9 ANXIETY: ICD-10-CM

## 2022-02-12 LAB
ALBUMIN SERPL BCP-MCNC: 3.7 G/DL (ref 3.4–4.8)
ALP SERPL-CCNC: 84 U/L (ref 35–140)
ALT SERPL W P-5'-P-CCNC: 10 U/L (ref 5–54)
ANION GAP SERPL CALCULATED.3IONS-SCNC: 10 MMOL/L (ref 4–13)
AST SERPL W P-5'-P-CCNC: 10 U/L (ref 15–41)
BASOPHILS # BLD AUTO: 0.05 THOUSANDS/ΜL (ref 0–0.1)
BASOPHILS NFR BLD AUTO: 1 % (ref 0–1)
BILIRUB SERPL-MCNC: 0.52 MG/DL (ref 0.3–1.2)
BNP SERPL-MCNC: 570.3 PG/ML (ref 1–100)
BUN SERPL-MCNC: 14 MG/DL (ref 6–20)
CALCIUM SERPL-MCNC: 9.2 MG/DL (ref 8.4–10.2)
CARDIAC TROPONIN I PNL SERPL HS: 31 NG/L
CARDIAC TROPONIN I PNL SERPL HS: 31 NG/L (ref 8–18)
CHLORIDE SERPL-SCNC: 106 MMOL/L (ref 96–108)
CO2 SERPL-SCNC: 26 MMOL/L (ref 22–33)
CREAT SERPL-MCNC: 0.89 MG/DL (ref 0.4–1.1)
EOSINOPHIL # BLD AUTO: 0.14 THOUSAND/ΜL (ref 0–0.61)
EOSINOPHIL NFR BLD AUTO: 2 % (ref 0–6)
ERYTHROCYTE [DISTWIDTH] IN BLOOD BY AUTOMATED COUNT: 18.3 % (ref 11.6–15.1)
GFR SERPL CREATININE-BSD FRML MDRD: 73 ML/MIN/1.73SQ M
GLUCOSE SERPL-MCNC: 176 MG/DL (ref 65–140)
HCT VFR BLD AUTO: 32.3 % (ref 34.8–46.1)
HGB BLD-MCNC: 9.6 G/DL (ref 11.5–15.4)
IMM GRANULOCYTES # BLD AUTO: 0.02 THOUSAND/UL (ref 0–0.2)
IMM GRANULOCYTES NFR BLD AUTO: 0 % (ref 0–2)
LYMPHOCYTES # BLD AUTO: 2.06 THOUSANDS/ΜL (ref 0.6–4.47)
LYMPHOCYTES NFR BLD AUTO: 22 % (ref 14–44)
MCH RBC QN AUTO: 25.3 PG (ref 26.8–34.3)
MCHC RBC AUTO-ENTMCNC: 29.7 G/DL (ref 31.4–37.4)
MCV RBC AUTO: 85 FL (ref 82–98)
MONOCYTES # BLD AUTO: 0.82 THOUSAND/ΜL (ref 0.17–1.22)
MONOCYTES NFR BLD AUTO: 9 % (ref 4–12)
NEUTROPHILS # BLD AUTO: 6.09 THOUSANDS/ΜL (ref 1.85–7.62)
NEUTS SEG NFR BLD AUTO: 66 % (ref 43–75)
NRBC BLD AUTO-RTO: 0 /100 WBCS
PLATELET # BLD AUTO: 284 THOUSANDS/UL (ref 149–390)
PMV BLD AUTO: 11.1 FL (ref 8.9–12.7)
POTASSIUM SERPL-SCNC: 3.6 MMOL/L (ref 3.5–5)
PROT SERPL-MCNC: 7 G/DL (ref 6.4–8.3)
RBC # BLD AUTO: 3.8 MILLION/UL (ref 3.81–5.12)
SODIUM SERPL-SCNC: 142 MMOL/L (ref 133–145)
WBC # BLD AUTO: 9.18 THOUSAND/UL (ref 4.31–10.16)

## 2022-02-12 PROCEDURE — 85025 COMPLETE CBC W/AUTO DIFF WBC: CPT | Performed by: INTERNAL MEDICINE

## 2022-02-12 PROCEDURE — 71045 X-RAY EXAM CHEST 1 VIEW: CPT

## 2022-02-12 PROCEDURE — 94760 N-INVAS EAR/PLS OXIMETRY 1: CPT

## 2022-02-12 PROCEDURE — 80053 COMPREHEN METABOLIC PANEL: CPT | Performed by: INTERNAL MEDICINE

## 2022-02-12 PROCEDURE — 83880 ASSAY OF NATRIURETIC PEPTIDE: CPT | Performed by: INTERNAL MEDICINE

## 2022-02-12 PROCEDURE — 99285 EMERGENCY DEPT VISIT HI MDM: CPT

## 2022-02-12 PROCEDURE — 93005 ELECTROCARDIOGRAM TRACING: CPT

## 2022-02-12 PROCEDURE — 84484 ASSAY OF TROPONIN QUANT: CPT | Performed by: INTERNAL MEDICINE

## 2022-02-12 PROCEDURE — 36415 COLL VENOUS BLD VENIPUNCTURE: CPT | Performed by: INTERNAL MEDICINE

## 2022-02-12 PROCEDURE — 94640 AIRWAY INHALATION TREATMENT: CPT

## 2022-02-12 PROCEDURE — 99284 EMERGENCY DEPT VISIT MOD MDM: CPT | Performed by: INTERNAL MEDICINE

## 2022-02-12 RX ORDER — IPRATROPIUM BROMIDE AND ALBUTEROL SULFATE 2.5; .5 MG/3ML; MG/3ML
3 SOLUTION RESPIRATORY (INHALATION) ONCE
Status: COMPLETED | OUTPATIENT
Start: 2022-02-12 | End: 2022-02-12

## 2022-02-12 RX ADMIN — IPRATROPIUM BROMIDE AND ALBUTEROL SULFATE 3 ML: 2.5; .5 SOLUTION RESPIRATORY (INHALATION) at 18:29

## 2022-02-12 NOTE — ED PROVIDER NOTES
History  Chief Complaint   Patient presents with    Shortness of Breath     pt with trach presents to ED with c/o sob after suctioning  THIS IS A 55YEARS OLD CAME FROM HOME FOR HAVING RESPIRATORY DISTRESS     Patient discharged from [facility] 2 days ago  Patient has tracheostomy and she was suctioned the tracheostomy and she went into respiratory distress call 911 brought her to the ER  Patient has bilateral air entry  Patient has pulse ox 100% on Ventimask 30%  Patient denies any chest pain  Patient has LifeVest   Patient was at the hospital 5 months ago for having acute MRI and she went into cardiac arrest   Patient was coded 5 times for cardiac arrest   On the arrival to the ER patient has /95  Patient has no nausea no vomiting no diaphoresis  Patient was very anxious  Patient also having history of diabetes hypertension CAD  Patient used to be smoker and she quit smoking  Prior to Admission Medications   Prescriptions Last Dose Informant Patient Reported? Taking?    Blood Pressure Monitoring (Sphygmomanometer) MISC  Self No No   Sig: Use 2 (two) times a day   Patient not taking: Reported on 2021   Insulin Pen Needle (UNIFINE PENTIPS PLUS) 31G X 6 MM MISC  Self Yes No   Si Device by Does not apply route 4 (four) times a day   Lancets MISC  Self Yes No   Si Device by Does not apply route 4 (four) times a day   Patient not taking: Reported on 2021   QUEtiapine (SEROquel) 25 mg tablet   No No   Sig: Take 1 tablet (25 mg total) by mouth daily at bedtime   acetaminophen (TYLENOL) 160 mg/5 mL suspension   No No   Si 4 mL (975 mg total) by Per G Tube route every 6 (six) hours as needed for mild pain or fever   albuterol (2 5 mg/3 mL) 0 083 % nebulizer solution   No No   Sig: Take 3 mL (2 5 mg total) by nebulization every 4 (four) hours as needed for wheezing   amiodarone 200 mg tablet   No No   Si tablet (200 mg total) by Per G Tube route 2 (two) times a day with meals   apixaban (ELIQUIS) 5 mg   No No   Sig: Take 1 tablet (5 mg total) by mouth 2 (two) times a day   apixaban (Eliquis) 5 mg   No No   Sig: Take 1 tablet (5 mg total) by mouth 2 (two) times a day for 7 days   atorvastatin (LIPITOR) 40 mg tablet   No No   Sig: TAKE 1 TABLET BY MOUTH EVERY DAY   chlorhexidine (PERIDEX) 0 12 % solution   No No   Sig: Apply 15 mL to the mouth or throat every 12 (twelve) hours   famotidine (PEPCID) 20 mg/2 5 mL oral suspension   No No   Sig: Take 2 5 mL (20 mg total) by mouth 2 (two) times a day   furosemide (LASIX) 10 mg/mL oral solution   No No   Si mL (40 mg total) by Per G Tube route 2 (two) times a day   insulin regular (HumuLIN R,NovoLIN R) 100 units/mL injection   No No   Sig: Inject 0 02-0 1 mL (2-10 Units total) under the skin every 6 (six) hours   insulin regular (HumuLIN R,NovoLIN R) 100 units/mL injection   No No   Sig: Inject 0 3 mL (30 Units total) under the skin every 6 (six) hours   ipratropium (ATROVENT) 0 02 % nebulizer solution   No No   Sig: Take 2 5 mL (0 5 mg total) by nebulization 3 (three) times a day   levalbuterol (XOPENEX) 1 25 mg/0 5 mL nebulizer solution   No No   Sig: Take 0 5 mL (1 25 mg total) by nebulization 3 (three) times a day   losartan (COZAAR) 25 mg tablet   No No   Sig: Take 1 tablet (25 mg total) by mouth daily   melatonin 3 mg   No No   Sig: Take 2 tablets (6 mg total) by mouth daily at bedtime   metoprolol tartrate (LOPRESSOR) 25 mg tablet   No No   Sig: Take 1 tablet (25 mg total) by mouth every 12 (twelve) hours   ondansetron (ZOFRAN) 4 mg/2 mL injection   No No   Sig: Infuse 2 mL (4 mg total) into a venous catheter every 6 (six) hours as needed for nausea or vomiting   polyethylene glycol (MIRALAX) 17 g packet   No No   Sig: Take 17 g by mouth daily   potassium chloride 10% oral solution   No No   Sig: Take 15 mL (20 mEq total) by mouth daily   pregabalin (LYRICA) 75 mg capsule  Self No No   Sig: TAKE ONE CAPSULE EVERY DAY senna-docusate sodium (SENOKOT S) 8 6-50 mg per tablet   No No   Sig: Take 1 tablet by mouth daily at bedtime   ticagrelor (BRILINTA) 90 MG   No No   Sig: Take 1 tablet (90 mg total) by mouth every 12 (twelve) hours      Facility-Administered Medications: None       Past Medical History:   Diagnosis Date    Anxiety     Aortic aneurysm (HCC)     Arthritis     Depression     Diabetes mellitus (HCC)     Fibromyalgia     GERD (gastroesophageal reflux disease)     GERD (gastroesophageal reflux disease)     H/O cardiovascular stress test 2018    no ischemia  EF 70%   H/O echocardiogram 2019    EF 60%  Mild LVH  trivial effusion   Hyperlipidemia     Hypertension     Migraines     Psychiatric disorder     anxiety       Past Surgical History:   Procedure Laterality Date    BACK SURGERY      Lumbar epidural steroid injection    CARDIAC CATHETERIZATION N/A 10/22/2021    Procedure: Cardiac pci;  Surgeon: Rosanna Wang MD;  [facility]; Service: Cardiology    CARDIAC CATHETERIZATION  10/22/2021    Procedure: Cardiac catheterization;  Surgeon: Rosanna Wang MD;  [facility]; Service: Cardiology    CARDIAC CATHETERIZATION Left 10/27/2021    Procedure: Cardiac Left Heart Cath;  Surgeon: Wilma Barrow MD;  [facility]; Service: Cardiology    CARDIAC CATHETERIZATION N/A 10/27/2021    Procedure: Cardiac pci;  Surgeon: Wilma Barrow MD;  [facility]; Service: Cardiology    CARDIAC CATHETERIZATION N/A 10/28/2021    Procedure: Cardiac pci;  Surgeon: Joseph Beltrán DO;  [facility];   Service: Cardiology    CARPAL TUNNEL RELEASE Left      SECTION      CHOLECYSTECTOMY      COLONOSCOPY      incomplete    COLONOSCOPY      EYE SURGERY      HYSTERECTOMY      Total    OVARIAN CYST REMOVAL      PEG W/TRACHEOSTOMY PLACEMENT N/A 2021    Procedure: TRACHEOSTOMY WITH INSERTION PEG TUBE; Surgeon: Jana Jacobsen To, DO;  Location: [facility];  Service: General    TUBAL LIGATION      UPPER GASTROINTESTINAL ENDOSCOPY         Family History   Problem Relation Age of Onset    Hypertension Mother    Ethel Juárez Arthritis Mother     Diabetes Father     Other Sister         renal cell carcinoma    Diabetes Other     Factor V Leiden deficiency Other      I have reviewed and agree with the history as documented  E-Cigarette/Vaping    E-Cigarette Use Never User      E-Cigarette/Vaping Substances    Nicotine No     THC No     CBD No     Flavoring No     Other No     Unknown No      Social History     Tobacco Use    Smoking status: Former Smoker     Packs/day: 1 00     Years: 30 00     Pack years: 30 00     Types: Cigarettes    Smokeless tobacco: Never Used   Vaping Use    Vaping Use: Never used   Substance Use Topics    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   Drug use: Yes     Types: Marijuana     Comment: medical; seldom use       Review of Systems   Constitutional: Negative for fatigue and fever  Respiratory: Positive for shortness of breath  Negative for cough and wheezing  Cardiovascular: Negative for chest pain and palpitations  Gastrointestinal: Negative for abdominal pain, diarrhea, nausea and vomiting  Genitourinary: Negative for difficulty urinating, dysuria, flank pain and frequency  Musculoskeletal: Negative for back pain, myalgias, neck pain and neck stiffness  Skin: Negative for color change, pallor and rash  Neurological: Negative for dizziness, syncope, weakness, light-headedness and headaches  Psychiatric/Behavioral: Negative for agitation and behavioral problems  Physical Exam  Physical Exam  Vitals and nursing note reviewed  Constitutional:       General: She is not in acute distress  Appearance: She is well-developed  She is not ill-appearing or toxic-appearing  HENT:      Head: Normocephalic and atraumatic        Mouth/Throat: Mouth: Mucous membranes are moist       Pharynx: No pharyngeal swelling or oropharyngeal exudate  Neck:      Thyroid: No thyromegaly  Vascular: No hepatojugular reflux or JVD  Comments: tracheostomy  Cardiovascular:      Rate and Rhythm: Normal rate and regular rhythm  Heart sounds: Normal heart sounds  No murmur heard  No friction rub  No gallop  Comments: Has lifeVest  Pulmonary:      Effort: Pulmonary effort is normal  No tachypnea, bradypnea or accessory muscle usage  Breath sounds: No stridor  Examination of the right-lower field reveals wheezing  Examination of the left-lower field reveals wheezing  Wheezing present  No decreased breath sounds, rhonchi or rales  Chest:      Chest wall: No mass, deformity, tenderness, crepitus or edema  There is no dullness to percussion  Abdominal:      General: Bowel sounds are normal       Palpations: Abdomen is soft  There is no hepatomegaly, splenomegaly or mass  Tenderness: There is no abdominal tenderness  There is no guarding or rebound  Musculoskeletal:         General: No tenderness or deformity  Normal range of motion  Cervical back: Normal range of motion and neck supple  Right lower leg: No tenderness  No edema  Left lower leg: No tenderness  No edema  Lymphadenopathy:      Cervical: No cervical adenopathy  Skin:     General: Skin is warm and dry  Capillary Refill: Capillary refill takes 2 to 3 seconds  Findings: No ecchymosis  Nails: There is no clubbing  Neurological:      Mental Status: She is alert and oriented to person, place, and time     Psychiatric:         Behavior: Behavior normal          Vital Signs  ED Triage Vitals   Temp Pulse Respirations Blood Pressure SpO2   -- 02/12/22 1807 02/12/22 1807 02/12/22 1807 02/12/22 1807    84 (!) 34 (!) 187/95 100 %      Temp src Heart Rate Source Patient Position - Orthostatic VS BP Location FiO2 (%)   -- 02/12/22 1807 02/12/22 1807 02/12/22 1807 02/12/22 1830    Monitor Lying Left arm 28      Pain Score       --                  Vitals:    02/12/22 1807 02/12/22 1900 02/12/22 2100   BP: (!) 187/95 140/76 153/73   Pulse: 84 74 78   Patient Position - Orthostatic VS: Lying Lying          Visual Acuity      ED Medications  Medications   ipratropium-albuterol (DUO-NEB) 0 5-2 5 mg/3 mL inhalation solution 3 mL (3 mL Nebulization Given 2/12/22 1829)       Diagnostic Studies  Results Reviewed     Procedure Component Value Units Date/Time    HS Troponin 0hr (reflex protocol) [[id number]]  (Normal) Collected: 02/12/22 2030    Lab Status: Final result Specimen: Blood from Arm, Right Updated: 02/12/22 2059     hs TnI 0hr 31 ng/L     High Sensitivity Troponin I Random [[id number]]  (Abnormal) Collected: 02/12/22 1816    Lab Status: Final result Specimen: Blood from Arm, Right Updated: 02/12/22 1849     HS TnI random 31 ng/L     B-Type Natriuretic Peptide(BNP) CA, ,  Campuses Only [[id number]]  (Abnormal) Collected: 02/12/22 1816    Lab Status: Final result Specimen: Blood from Arm, Right Updated: 02/12/22 1848      3 pg/mL     Comprehensive metabolic panel [[id number]]  (Abnormal) Collected: 02/12/22 1816    Lab Status: Final result Specimen: Blood from Arm, Right Updated: 02/12/22 1845     Sodium 142 mmol/L      Potassium 3 6 mmol/L      Chloride 106 mmol/L      CO2 26 mmol/L      ANION GAP 10 mmol/L      BUN 14 mg/dL      Creatinine 0 89 mg/dL      Glucose 176 mg/dL      Calcium 9 2 mg/dL      AST 10 U/L      ALT 10 U/L      Alkaline Phosphatase 84 0 U/L      Total Protein 7 0 g/dL      Albumin 3 7 g/dL      Total Bilirubin 0 52 mg/dL      eGFR 73 ml/min/1 73sq m     Narrative:      Jamin guidelines for Chronic Kidney Disease (CKD):     Stage 1 with normal or high GFR (GFR > 90 mL/min/1 73 square meters)    Stage 2 Mild CKD (GFR = 60-89 mL/min/1 73 square meters)    Stage 3A Moderate CKD (GFR = 45-59 mL/min/1 73 square meters)    Stage 3B Moderate CKD (GFR = 30-44 mL/min/1 73 square meters)    Stage 4 Severe CKD (GFR = 15-29 mL/min/1 73 square meters)    Stage 5 End Stage CKD (GFR <15 mL/min/1 73 square meters)  Note: GFR calculation is accurate only with a steady state creatinine    CBC and differential [[id number]]  (Abnormal) Collected: 02/12/22 1816    Lab Status: Final result Specimen: Blood from Arm, Right Updated: 02/12/22 1822     WBC 9 18 Thousand/uL      RBC 3 80 Million/uL      Hemoglobin 9 6 g/dL      Hematocrit 32 3 %      MCV 85 fL      MCH 25 3 pg      MCHC 29 7 g/dL      RDW 18 3 %      MPV 11 1 fL      Platelets 767 Thousands/uL      nRBC 0 /100 WBCs      Neutrophils Relative 66 %      Immat GRANS % 0 %      Lymphocytes Relative 22 %      Monocytes Relative 9 %      Eosinophils Relative 2 %      Basophils Relative 1 %      Neutrophils Absolute 6 09 Thousands/µL      Immature Grans Absolute 0 02 Thousand/uL      Lymphocytes Absolute 2 06 Thousands/µL      Monocytes Absolute 0 82 Thousand/µL      Eosinophils Absolute 0 14 Thousand/µL      Basophils Absolute 0 05 Thousands/µL                  XR chest portable   Final Result by Salomon Patel MD (02/13 5000)      Moderate pulmonary edema  Workstation performed: [id number]                    Procedures  Procedures         ED Course  ED Course as of 02/14/22 1812   Sat Feb 12, 2022   1857 Ekg; sinus rhythm rate 72/min, RBBB, ST depression in V3   2035 Patient feels better on oxygen P  Patient has no wheezing  He got the 1st troponin and are going to wait for the 2nd troponin if came back better on no elevation patient can be discharged home     2035 At this time patient is asymptomatic                                             MDM  Number of Diagnoses or Management Options  Dyspnea, unspecified type  H/O tracheostomy  Diagnosis management comments: A this is a 55years old with extensive cardiac history and she has acute MRI about 5 months ago which tend up that she she went into cardiac arrest 5 times  Patient has life vest and she has tracheostomy  Patient just discharged from the rehab 2 days ago and at home she try to do suction of the tracheostomy she get panic become short of breath called 911 brought to the ER  On the arrival to the ER patient has pulse ox 100% has no chest pain no fever no cough  Labs discussed with the patient , troponin not elevated  Will d/c home       Amount and/or Complexity of Data Reviewed  Clinical lab tests: ordered and reviewed  Tests in the radiology section of CPT®: ordered and reviewed    Risk of Complications, Morbidity, and/or Mortality  Presenting problems: high  Diagnostic procedures: high  Management options: high        Disposition  Final diagnoses:   Dyspnea, unspecified type   H/O tracheostomy   Anxiety     Time reflects when diagnosis was documented in both MDM as applicable and the Disposition within this note     Time User Action Codes Description Comment    2/12/2022  8:37 PM Qiana Hennessy Add [R06 00] Dyspnea, unspecified type     2/12/2022  8:37 PM Akua Grier [[id number]] H/O tracheostomy     2/12/2022  9:05 PM Akua Grier [F41 [id number]] Anxiety       ED Disposition     ED Disposition Condition Date/Time Comment    Discharge Stable Sat Feb 12, 2022  9:04 PM Kelli Red discharge to home/self care              Follow-up Information     Follow up With Specialties Details Why Contact Info    Katty Kirkpatrick MD Family Medicine In 3 days  [address] [place] [address]  [phone]            Discharge Medication List as of 2/12/2022  9:05 PM      CONTINUE these medications which have NOT CHANGED    Details   acetaminophen (TYLENOL) 160 mg/5 mL suspension 30 4 mL (975 mg total) by Per G Tube route every 6 (six) hours as needed for mild pain or fever, Starting Tue 11/23/2021, No Print      albuterol (2 5 mg/3 mL) 0 083 % nebulizer solution Take 3 mL (2 5 mg total) by nebulization every 4 (four) hours as needed for wheezing, Starting Tue 11/23/2021, No Print      amiodarone 200 mg tablet 1 tablet (200 mg total) by Per G Tube route 2 (two) times a day with meals, Starting Tue 11/23/2021, No Print      apixaban (ELIQUIS) 5 mg Take 1 tablet (5 mg total) by mouth 2 (two) times a day, Starting Fri 11/19/2021, Normal      atorvastatin (LIPITOR) 40 mg tablet TAKE 1 TABLET BY MOUTH EVERY DAY, Normal      Blood Pressure Monitoring (Sphygmomanometer) MISC Use 2 (two) times a day, Starting Fri 4/30/2021, Normal      chlorhexidine (PERIDEX) 0 12 % solution Apply 15 mL to the mouth or throat every 12 (twelve) hours, Starting Tue 11/23/2021, No Print      famotidine (PEPCID) 20 mg/2 5 mL oral suspension Take 2 5 mL (20 mg total) by mouth 2 (two) times a day, Starting Tue 11/23/2021, No Print      furosemide (LASIX) 10 mg/mL oral solution 4 mL (40 mg total) by Per G Tube route 2 (two) times a day, Starting Tue 11/23/2021, No Print      Insulin Pen Needle (UNIFINE PENTIPS PLUS) 31G X 6 MM MISC 1 Device by Does not apply route 4 (four) times a day, Starting Mon 6/5/2017, Historical Med      !! insulin regular (HumuLIN R,NovoLIN R) 100 units/mL injection Inject 0 02-0 1 mL (2-10 Units total) under the skin every 6 (six) hours, Starting Tue 11/23/2021, No Print      !! insulin regular (HumuLIN R,NovoLIN R) 100 units/mL injection Inject 0 3 mL (30 Units total) under the skin every 6 (six) hours, Starting Tue 11/23/2021, No Print      ipratropium (ATROVENT) 0 02 % nebulizer solution Take 2 5 mL (0 5 mg total) by nebulization 3 (three) times a day, Starting Tue 11/23/2021, No Print      Lancets MISC 1 Device by Does not apply route 4 (four) times a day, Historical Med      levalbuterol (XOPENEX) 1 25 mg/0 5 mL nebulizer solution Take 0 5 mL (1 25 mg total) by nebulization 3 (three) times a day, Starting Tue 11/23/2021, No Print      losartan (COZAAR) 25 mg tablet Take 1 tablet (25 mg total) by mouth daily, Starting Wed 11/24/2021, No Print      melatonin 3 mg Take 2 tablets (6 mg total) by mouth daily at bedtime, Starting Tue 11/23/2021, No Print      metoprolol tartrate (LOPRESSOR) 25 mg tablet Take 1 tablet (25 mg total) by mouth every 12 (twelve) hours, Starting Tue 11/23/2021, No Print      ondansetron (ZOFRAN) 4 mg/2 mL injection Infuse 2 mL (4 mg total) into a venous catheter every 6 (six) hours as needed for nausea or vomiting, Starting Tue 11/23/2021, No Print      polyethylene glycol (MIRALAX) 17 g packet Take 17 g by mouth daily, Starting Tue 11/23/2021, No Print      potassium chloride 10% oral solution Take 15 mL (20 mEq total) by mouth daily, Starting Wed 11/24/2021, No Print      pregabalin (LYRICA) 75 mg capsule TAKE ONE CAPSULE EVERY DAY, Normal      QUEtiapine (SEROquel) 25 mg tablet Take 1 tablet (25 mg total) by mouth daily at bedtime, Starting Tue 11/23/2021, No Print      senna-docusate sodium (SENOKOT S) 8 6-50 mg per tablet Take 1 tablet by mouth daily at bedtime, Starting Tue 11/23/2021, No Print      ticagrelor (BRILINTA) 90 MG Take 1 tablet (90 mg total) by mouth every 12 (twelve) hours, Starting Fri 10/22/2021, Normal       !! - Potential duplicate medications found  Please discuss with provider  No discharge procedures on file      PDMP Review       Value Time User    PDMP Reviewed  Yes 3/8/2021  9:59 PM Kathy Call DO          ED Provider  Electronically Signed by           Melanie Anaya MD  02/14/22 7059

## 2022-02-13 LAB
ATRIAL RATE: 72 BPM
P AXIS: -18 DEGREES
PR INTERVAL: 166 MS
QRS AXIS: -21 DEGREES
QRSD INTERVAL: 135 MS
QT INTERVAL: 521 MS
QTC INTERVAL: 571 MS
T WAVE AXIS: 21 DEGREES
VENTRICULAR RATE: 72 BPM

## 2022-02-13 PROCEDURE — 93010 ELECTROCARDIOGRAM REPORT: CPT | Performed by: INTERNAL MEDICINE

## 2022-02-13 NOTE — DISCHARGE INSTRUCTIONS
Follow up with dr Jones Favors - Abnormal       Result Value Ref Range Status    WBC 9 18  4 31 - 10 16 Thousand/uL Final    RBC 3 80 (*) 3 81 - 5 12 Million/uL Final    Hemoglobin 9 6 (*) 11 5 - 15 4 g/dL Final    Hematocrit 32 3 (*) 34 8 - 46 1 % Final    MCV 85  82 - 98 fL Final    MCH 25 3 (*) 26 8 - 34 3 pg Final    MCHC 29 7 (*) 31 4 - 37 4 g/dL Final    RDW 18 3 (*) 11 6 - 15 1 % Final    MPV 11 1  8 9 - 12 7 fL Final    Platelets 842  677 - 390 Thousands/uL Final    nRBC 0  /100 WBCs Final    Neutrophils Relative 66  43 - 75 % Final    Immat GRANS % 0  0 - 2 % Final    Lymphocytes Relative 22  14 - 44 % Final    Monocytes Relative 9  4 - 12 % Final    Eosinophils Relative 2  0 - 6 % Final    Basophils Relative 1  0 - 1 % Final    Neutrophils Absolute 6 09  1 85 - 7 62 Thousands/µL Final    Immature Grans Absolute 0 02  0 00 - 0 20 Thousand/uL Final    Lymphocytes Absolute 2 06  0 60 - 4 47 Thousands/µL Final    Monocytes Absolute 0 82  0 17 - 1 22 Thousand/µL Final    Eosinophils Absolute 0 14  0 00 - 0 61 Thousand/µL Final    Basophils Absolute 0 05  0 00 - 0 10 Thousands/µL Final   COMPREHENSIVE METABOLIC PANEL - Abnormal    Sodium 142  133 - 145 mmol/L Final    Potassium 3 6  3 5 - 5 0 mmol/L Final    Chloride 106  96 - 108 mmol/L Final    CO2 26  22 - 33 mmol/L Final    ANION GAP 10  4 - 13 mmol/L Final    BUN 14  6 - 20 mg/dL Final    Creatinine 0 89  0 40 - 1 10 mg/dL Final    Comment: Standardized to IDMS reference method    Glucose 176 (*) 65 - 140 mg/dL Final    Comment: If the patient is fasting, the ADA then defines impaired fasting glucose as > 100 mg/dL and diabetes as > or equal to 123 mg/dL  Specimen collection should occur prior to Sulfasalazine administration due to the potential for falsely depressed results  Specimen collection should occur prior to Sulfapyridine administration due to the potential for falsely elevated results      Calcium 9 2  8 4 - 10 2 mg/dL Final    AST 10 (*) 15 - 41 U/L Final    Comment: Specimen collection should occur prior to Sulfasalazine administration due to the potential for falsely depressed results  ALT 10  5 - 54 U/L Final    Comment: Specimen collection should occur prior to Sulfasalazine administration due to the potential for falsely depressed results  Alkaline Phosphatase 84 0  35 - 140 U/L Final    Total Protein 7 0  6 4 - 8 3 g/dL Final    Albumin 3 7  3 4 - 4 8 g/dL Final    Total Bilirubin 0 52  0 30 - 1 20 mg/dL Final    eGFR 73  ml/min/1 73sq m Final    Narrative:     [place] guidelines for Chronic Kidney Disease (CKD):     Stage 1 with normal or high GFR (GFR > 90 mL/min/1 73 square meters)    Stage 2 Mild CKD (GFR = 60-89 mL/min/1 73 square meters)    Stage 3A Moderate CKD (GFR = 45-59 mL/min/1 73 square meters)    Stage 3B Moderate CKD (GFR = 30-44 mL/min/1 73 square meters)    Stage 4 Severe CKD (GFR = 15-29 mL/min/1 73 square meters)    Stage 5 End Stage CKD (GFR <15 mL/min/1 73 square meters)  Note: GFR calculation is accurate only with a steady state creatinine   HIGH SENSITIVITY TROPONIN I RANDOM - Abnormal    HS TnI random 31 (*) 8 - 18 ng/L Final    Comment:                                              Initial (time 0) result  If >=50 ng/L, Myocardial injury suggested ;  Type of myocardial injury and treatment strategy  to be determined  If 5-49 ng/L, a delta result at 2 hours and or 4 hours will be needed to further evaluate  If <4 ng/L, and chest pain has been >3 hours since onset, patient may qualify for discharge based on the HEART score in the ED  If <5 ng/L and <3hours since onset of chest pain, a delta result at 2 hours will be needed to further evaluate  Second Troponin (time 2 hours)  If calculated delta >= 20 ng/L,  Myocardial injury suggested ; Type of myocardial injury and treatment strategy to be determined    If 5-49 ng/L and the calculated delta is 5-19 ng/L, consult medical service for evaluation  Continue evaluation for ischemia on ecg and other possible etiology and repeat hs troponin at 4 hours  If delta is <5 ng/L at 2 hours, consider discharge based on risk stratification via the HEART score (if in ED), or RALPH risk score in IP/Observation  B-TYPE NATRIURETIC PEPTIDE (BNP)[facility] ONLY - Abnormal     3 (*) 1 - 100 pg/mL Final   HS TROPONIN I 0HR - Normal    hs TnI 0hr 31  "Refer to ACS Flowchart" - see link ng/L Final    Comment:                                              Initial (time 0) result  If >=50 ng/L, Myocardial injury suggested ;  Type of myocardial injury and treatment strategy  to be determined  If 5-49 ng/L, a delta result at 2 hours and or 4 hours will be needed to further evaluate  If <4 ng/L, and chest pain has been >3 hours since onset, patient may qualify for discharge based on the HEART score in the ED  If <5 ng/L and <3hours since onset of chest pain, a delta result at 2 hours will be needed to further evaluate  Second Troponin (time 2 hours)  If calculated delta >= 20 ng/L,  Myocardial injury suggested ; Type of myocardial injury and treatment strategy to be determined  If 5-49 ng/L and the calculated delta is 5-19 ng/L, consult medical service for evaluation  Continue evaluation for ischemia on ecg and other possible etiology and repeat hs troponin at 4 hours  If delta is <5 ng/L at 2 hours, consider discharge based on risk stratification via the HEART score (if in ED), or RALPH risk score in IP/Observation     HS TROPONIN I 2HR     XR chest portable    (Results Pending)

## 2022-02-16 ENCOUNTER — TELEPHONE (OUTPATIENT)
Dept: FAMILY MEDICINE CLINIC | Facility: CLINIC | Age: 57
End: 2022-02-16

## 2022-02-16 NOTE — TELEPHONE ENCOUNTER
Document from [address] [place] [address] that needs to be filled out  It will be placed in your folder in the preceptor room  Please advise, thank you!

## 2022-02-21 ENCOUNTER — TELEPHONE (OUTPATIENT)
Dept: FAMILY MEDICINE CLINIC | Facility: CLINIC | Age: 57
End: 2022-02-21

## 2022-02-21 ENCOUNTER — APPOINTMENT (EMERGENCY)
Dept: RADIOLOGY | Facility: HOSPITAL | Age: 57
End: 2022-02-21
Payer: MEDICARE

## 2022-02-21 ENCOUNTER — HOSPITAL ENCOUNTER (EMERGENCY)
Facility: HOSPITAL | Age: 57
Discharge: HOME | End: 2022-02-21
Attending: EMERGENCY MEDICINE
Payer: MEDICARE

## 2022-02-21 VITALS
RESPIRATION RATE: 24 BRPM | OXYGEN SATURATION: 97 % | TEMPERATURE: 97.7 F | SYSTOLIC BLOOD PRESSURE: 155 MMHG | DIASTOLIC BLOOD PRESSURE: 75 MMHG | HEART RATE: 66 BPM

## 2022-02-21 DIAGNOSIS — R06.03 RESPIRATORY DISTRESS: Primary | ICD-10-CM

## 2022-02-21 DIAGNOSIS — F41.9 ANXIETY: ICD-10-CM

## 2022-02-21 LAB
ALBUMIN SERPL BCP-MCNC: 3.6 G/DL (ref 3.4–4.8)
ALP SERPL-CCNC: 86.5 U/L (ref 35–140)
ALT SERPL W P-5'-P-CCNC: 10 U/L (ref 5–54)
ANION GAP SERPL CALCULATED.3IONS-SCNC: 12 MMOL/L (ref 4–13)
AST SERPL W P-5'-P-CCNC: 10 U/L (ref 15–41)
BASOPHILS # BLD AUTO: 0.02 THOUSANDS/ΜL (ref 0–0.1)
BASOPHILS NFR BLD AUTO: 0 % (ref 0–1)
BILIRUB SERPL-MCNC: 0.87 MG/DL (ref 0.3–1.2)
BUN SERPL-MCNC: 13 MG/DL (ref 6–20)
CALCIUM SERPL-MCNC: 8.8 MG/DL (ref 8.4–10.2)
CHLORIDE SERPL-SCNC: 109 MMOL/L (ref 96–108)
CO2 SERPL-SCNC: 22 MMOL/L (ref 22–33)
CREAT SERPL-MCNC: 0.72 MG/DL (ref 0.4–1.1)
EOSINOPHIL # BLD AUTO: 0.15 THOUSAND/ΜL (ref 0–0.61)
EOSINOPHIL NFR BLD AUTO: 2 % (ref 0–6)
ERYTHROCYTE [DISTWIDTH] IN BLOOD BY AUTOMATED COUNT: 18.6 % (ref 11.6–15.1)
GFR SERPL CREATININE-BSD FRML MDRD: 94 ML/MIN/1.73SQ M
GLUCOSE SERPL-MCNC: 199 MG/DL (ref 65–140)
HCT VFR BLD AUTO: 32.3 % (ref 34.8–46.1)
HGB BLD-MCNC: 9.7 G/DL (ref 11.5–15.4)
IMM GRANULOCYTES # BLD AUTO: 0.03 THOUSAND/UL (ref 0–0.2)
IMM GRANULOCYTES NFR BLD AUTO: 0 % (ref 0–2)
LYMPHOCYTES # BLD AUTO: 1.34 THOUSANDS/ΜL (ref 0.6–4.47)
LYMPHOCYTES NFR BLD AUTO: 15 % (ref 14–44)
MCH RBC QN AUTO: 25.2 PG (ref 26.8–34.3)
MCHC RBC AUTO-ENTMCNC: 30 G/DL (ref 31.4–37.4)
MCV RBC AUTO: 84 FL (ref 82–98)
MONOCYTES # BLD AUTO: 0.71 THOUSAND/ΜL (ref 0.17–1.22)
MONOCYTES NFR BLD AUTO: 8 % (ref 4–12)
NEUTROPHILS # BLD AUTO: 7.02 THOUSANDS/ΜL (ref 1.85–7.62)
NEUTS SEG NFR BLD AUTO: 75 % (ref 43–75)
NRBC BLD AUTO-RTO: 0 /100 WBCS
PLATELET # BLD AUTO: 307 THOUSANDS/UL (ref 149–390)
PMV BLD AUTO: 11.3 FL (ref 8.9–12.7)
POTASSIUM SERPL-SCNC: 3.5 MMOL/L (ref 3.5–5)
PROT SERPL-MCNC: 7 G/DL (ref 6.4–8.3)
RBC # BLD AUTO: 3.85 MILLION/UL (ref 3.81–5.12)
SODIUM SERPL-SCNC: 143 MMOL/L (ref 133–145)
WBC # BLD AUTO: 9.27 THOUSAND/UL (ref 4.31–10.16)

## 2022-02-21 PROCEDURE — 80053 COMPREHEN METABOLIC PANEL: CPT | Performed by: EMERGENCY MEDICINE

## 2022-02-21 PROCEDURE — 85025 COMPLETE CBC W/AUTO DIFF WBC: CPT | Performed by: EMERGENCY MEDICINE

## 2022-02-21 PROCEDURE — 96374 THER/PROPH/DIAG INJ IV PUSH: CPT

## 2022-02-21 PROCEDURE — 93005 ELECTROCARDIOGRAM TRACING: CPT

## 2022-02-21 PROCEDURE — 94760 N-INVAS EAR/PLS OXIMETRY 1: CPT

## 2022-02-21 PROCEDURE — 94640 AIRWAY INHALATION TREATMENT: CPT

## 2022-02-21 PROCEDURE — 71045 X-RAY EXAM CHEST 1 VIEW: CPT

## 2022-02-21 PROCEDURE — 96375 TX/PRO/DX INJ NEW DRUG ADDON: CPT

## 2022-02-21 PROCEDURE — 99285 EMERGENCY DEPT VISIT HI MDM: CPT | Performed by: EMERGENCY MEDICINE

## 2022-02-21 PROCEDURE — 36415 COLL VENOUS BLD VENIPUNCTURE: CPT | Performed by: EMERGENCY MEDICINE

## 2022-02-21 PROCEDURE — 99285 EMERGENCY DEPT VISIT HI MDM: CPT

## 2022-02-21 RX ORDER — FUROSEMIDE 10 MG/ML
40 INJECTION INTRAMUSCULAR; INTRAVENOUS ONCE
Status: COMPLETED | OUTPATIENT
Start: 2022-02-21 | End: 2022-02-21

## 2022-02-21 RX ORDER — IPRATROPIUM BROMIDE AND ALBUTEROL SULFATE 2.5; .5 MG/3ML; MG/3ML
3 SOLUTION RESPIRATORY (INHALATION)
Status: DISCONTINUED | OUTPATIENT
Start: 2022-02-21 | End: 2022-02-21 | Stop reason: HOSPADM

## 2022-02-21 RX ORDER — LORAZEPAM 2 MG/ML
1 INJECTION INTRAMUSCULAR ONCE
Status: COMPLETED | OUTPATIENT
Start: 2022-02-21 | End: 2022-02-21

## 2022-02-21 RX ORDER — LORAZEPAM 1 MG/1
0.5 TABLET ORAL 2 TIMES DAILY PRN
Qty: 10 TABLET | Refills: 0 | Status: ON HOLD | OUTPATIENT
Start: 2022-02-21 | End: 2022-03-03 | Stop reason: SDUPTHER

## 2022-02-21 RX ADMIN — IPRATROPIUM BROMIDE AND ALBUTEROL SULFATE 3 ML: 2.5; .5 SOLUTION RESPIRATORY (INHALATION) at 13:20

## 2022-02-21 RX ADMIN — LORAZEPAM 1 MG: 2 INJECTION INTRAMUSCULAR; INTRAVENOUS at 13:01

## 2022-02-21 RX ADMIN — FUROSEMIDE 40 MG: 10 INJECTION, SOLUTION INTRAMUSCULAR; INTRAVENOUS at 14:10

## 2022-02-21 NOTE — TELEPHONE ENCOUNTER
Patient's sister Daysi Marrufo called requesting medication for anxiety  She also stated the patient got sent home without a wheelchair, her oxygen wire is too short, and no shower commode

## 2022-02-21 NOTE — ED PROVIDER NOTES
History  Chief Complaint   Patient presents with    Respiratory Distress     brought in by EMS, reports respiratory distress 30 minutes PTA, 98% on 10LPM via trach, tachypneic     52-year-old female presenting with acute onset of shortness of breath  Patient has significant cardiovascular history with cardiac arrest and had trach placed  Patient has been seen in the emergency department with acute onset of shortness of that was believed to be anxiety related  Patient called 911 due to her shortness of breath  She is on her normal supplemental oxygen satting 100%  Patient believes she could be having another anxiety attack          Prior to Admission Medications   Prescriptions Last Dose Informant Patient Reported? Taking?    Blood Pressure Monitoring (Sphygmomanometer) MISC  Self No No   Sig: Use 2 (two) times a day   Patient not taking: Reported on 2021   Insulin Pen Needle (UNIFINE PENTIPS PLUS) 31G X 6 MM MISC  Self Yes No   Si Device by Does not apply route 4 (four) times a day   Lancets MISC  Self Yes No   Si Device by Does not apply route 4 (four) times a day   Patient not taking: Reported on 2021   QUEtiapine (SEROquel) 25 mg tablet   No No   Sig: Take 1 tablet (25 mg total) by mouth daily at bedtime   acetaminophen (TYLENOL) 160 mg/5 mL suspension   No No   Si 4 mL (975 mg total) by Per G Tube route every 6 (six) hours as needed for mild pain or fever   albuterol (2 5 mg/3 mL) 0 083 % nebulizer solution   No No   Sig: Take 3 mL (2 5 mg total) by nebulization every 4 (four) hours as needed for wheezing   amiodarone 200 mg tablet   No No   Si tablet (200 mg total) by Per G Tube route 2 (two) times a day with meals   apixaban (ELIQUIS) 5 mg   No No   Sig: Take 1 tablet (5 mg total) by mouth 2 (two) times a day   apixaban (Eliquis) 5 mg   No No   Sig: Take 1 tablet (5 mg total) by mouth 2 (two) times a day for 7 days   atorvastatin (LIPITOR) 40 mg tablet   No No   Sig: TAKE 1 TABLET BY MOUTH EVERY DAY   chlorhexidine (PERIDEX) 0 12 % solution   No No   Sig: Apply 15 mL to the mouth or throat every 12 (twelve) hours   famotidine (PEPCID) 20 mg/2 5 mL oral suspension   No No   Sig: Take 2 5 mL (20 mg total) by mouth 2 (two) times a day   furosemide (LASIX) 10 mg/mL oral solution   No No   Si mL (40 mg total) by Per G Tube route 2 (two) times a day   insulin regular (HumuLIN R,NovoLIN R) 100 units/mL injection   No No   Sig: Inject 0 02-0 1 mL (2-10 Units total) under the skin every 6 (six) hours   insulin regular (HumuLIN R,NovoLIN R) 100 units/mL injection   No No   Sig: Inject 0 3 mL (30 Units total) under the skin every 6 (six) hours   ipratropium (ATROVENT) 0 02 % nebulizer solution   No No   Sig: Take 2 5 mL (0 5 mg total) by nebulization 3 (three) times a day   levalbuterol (XOPENEX) 1 25 mg/0 5 mL nebulizer solution   No No   Sig: Take 0 5 mL (1 25 mg total) by nebulization 3 (three) times a day   losartan (COZAAR) 25 mg tablet   No No   Sig: Take 1 tablet (25 mg total) by mouth daily   melatonin 3 mg   No No   Sig: Take 2 tablets (6 mg total) by mouth daily at bedtime   metoprolol tartrate (LOPRESSOR) 25 mg tablet   No No   Sig: Take 1 tablet (25 mg total) by mouth every 12 (twelve) hours   ondansetron (ZOFRAN) 4 mg/2 mL injection   No No   Sig: Infuse 2 mL (4 mg total) into a venous catheter every 6 (six) hours as needed for nausea or vomiting   polyethylene glycol (MIRALAX) 17 g packet   No No   Sig: Take 17 g by mouth daily   potassium chloride 10% oral solution   No No   Sig: Take 15 mL (20 mEq total) by mouth daily   pregabalin (LYRICA) 75 mg capsule  Self No No   Sig: TAKE ONE CAPSULE EVERY DAY   senna-docusate sodium (SENOKOT S) 8 6-50 mg per tablet   No No   Sig: Take 1 tablet by mouth daily at bedtime   ticagrelor (BRILINTA) 90 MG   No No   Sig: Take 1 tablet (90 mg total) by mouth every 12 (twelve) hours      Facility-Administered Medications: None       Past Medical History:   Diagnosis Date    Anxiety     Aortic aneurysm (HCC)     Arthritis     Depression     Diabetes mellitus (HCC)     Fibromyalgia     GERD (gastroesophageal reflux disease)     GERD (gastroesophageal reflux disease)     H/O cardiovascular stress test 2018    no ischemia  EF 70%   H/O echocardiogram 2019    EF 60%  Mild LVH  trivial effusion   Hyperlipidemia     Hypertension     Migraines     Psychiatric disorder     anxiety       Past Surgical History:   Procedure Laterality Date    BACK SURGERY      Lumbar epidural steroid injection    CARDIAC CATHETERIZATION N/A 10/22/2021    Procedure: Cardiac pci;  Surgeon: Rosanna Wang MD;  [facility]; Service: Cardiology    CARDIAC CATHETERIZATION  10/22/2021    Procedure: Cardiac catheterization;  Surgeon: Rosanna Wang MD;  [facility]; Service: Cardiology    CARDIAC CATHETERIZATION Left 10/27/2021    Procedure: Cardiac Left Heart Cath;  Surgeon: Wilma Barrow MD;  [facility]; Service: Cardiology    CARDIAC CATHETERIZATION N/A 10/27/2021    Procedure: Cardiac pci;  Surgeon: Wilma Barrow MD;  [facility]; Service: Cardiology    CARDIAC CATHETERIZATION N/A 10/28/2021    Procedure: Cardiac pci;  Surgeon: Joseph Beltrán DO;  [facility];   Service: Cardiology    CARPAL TUNNEL RELEASE Left      SECTION      CHOLECYSTECTOMY      COLONOSCOPY      incomplete    COLONOSCOPY      EYE SURGERY      HYSTERECTOMY      Total    OVARIAN CYST REMOVAL      PEG W/TRACHEOSTOMY PLACEMENT N/A 2021    Procedure: TRACHEOSTOMY WITH INSERTION PEG TUBE;  Surgeon: Fara Fung DO;  Location: [facility];  Service: General    TUBAL LIGATION      UPPER GASTROINTESTINAL ENDOSCOPY         Family History   Problem Relation Age of Onset    Hypertension Mother    Joann Pall Arthritis Mother     Diabetes Father     Other Sister         renal cell carcinoma    Diabetes Other     Factor V Leiden deficiency Other      I have reviewed and agree with the history as documented  E-Cigarette/Vaping    E-Cigarette Use Never User      E-Cigarette/Vaping Substances    Nicotine No     THC No     CBD No     Flavoring No     Other No     Unknown No      Social History     Tobacco Use    Smoking status: Former Smoker     Packs/day: 1 00     Years: 30 00     Pack years: 30 00     Types: Cigarettes    Smokeless tobacco: Never Used   Vaping Use    Vaping Use: Never used   Substance Use Topics    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   Drug use: Yes     Types: Marijuana     Comment: medical; seldom use       Review of Systems   Constitutional: Negative for fever  HENT: Negative for congestion  Eyes: Negative for visual disturbance  Respiratory: Positive for shortness of breath  Negative for cough  Cardiovascular: Negative for chest pain  Gastrointestinal: Negative for abdominal pain  Musculoskeletal: Negative for neck pain  Skin: Negative for rash  Neurological: Negative for weakness  Psychiatric/Behavioral: The patient is nervous/anxious  Physical Exam  Physical Exam  Vitals and nursing note reviewed  Constitutional:       Comments: Tachypneic and anxious   HENT:      Head: Normocephalic and atraumatic  Mouth/Throat:      Pharynx: No posterior oropharyngeal erythema  Eyes:      Conjunctiva/sclera: Conjunctivae normal    Neck:      Comments: Tracheostomy site clean and intact  Cardiovascular:      Rate and Rhythm: Regular rhythm  Pulmonary:      Effort: Pulmonary effort is normal  No respiratory distress  Breath sounds: Wheezing present  Abdominal:      General: There is no distension  Musculoskeletal:      Cervical back: No rigidity  Right lower leg: Edema present  Left lower leg: Edema present  Skin:     General: Skin is warm and dry     Neurological:      Mental Status: She is alert  Mental status is at baseline  Psychiatric:         Mood and Affect: Mood is anxious           Vital Signs  ED Triage Vitals   Temperature Pulse Respirations Blood Pressure SpO2   02/21/22 1307 02/21/22 1257 02/21/22 1257 02/21/22 1257 02/21/22 1257   97 7 °F (36 5 °C) 70 (!) 40 (!) 140/103 99 %      Temp Source Heart Rate Source Patient Position - Orthostatic VS BP Location FiO2 (%)   02/21/22 1307 02/21/22 1257 02/21/22 1257 02/21/22 1257 02/21/22 1308   Oral Monitor Lying Left arm 35      Pain Score       --                  Vitals:    02/21/22 1257   BP: (!) 140/103   Pulse: 70   Patient Position - Orthostatic VS: Lying         Visual Acuity      ED Medications  Medications   ipratropium-albuterol (DUO-NEB) 0 5-2 5 mg/3 mL inhalation solution 3 mL (3 mL Nebulization Given 2/21/22 1320)   furosemide (LASIX) injection 40 mg (has no administration in time range)   LORazepam (ATIVAN) injection 1 mg (1 mg Intravenous Given 2/21/22 1301)       Diagnostic Studies  Results Reviewed     Procedure Component Value Units Date/Time    Comprehensive metabolic panel [[id number]]  (Abnormal) Collected: 02/21/22 1301    Lab Status: Final result Specimen: Blood from Arm, Right Updated: 02/21/22 1324     Sodium 143 mmol/L      Potassium 3 5 mmol/L      Chloride 109 mmol/L      CO2 22 mmol/L      ANION GAP 12 mmol/L      BUN 13 mg/dL      Creatinine 0 72 mg/dL      Glucose 199 mg/dL      Calcium 8 8 mg/dL      AST 10 U/L      ALT 10 U/L      Alkaline Phosphatase 86 5 U/L      Total Protein 7 0 g/dL      Albumin 3 6 g/dL      Total Bilirubin 0 87 mg/dL      eGFR 94 ml/min/1 73sq m     Narrative:      [place] guidelines for Chronic Kidney Disease (CKD):     Stage 1 with normal or high GFR (GFR > 90 mL/min/1 73 square meters)    Stage 2 Mild CKD (GFR = 60-89 mL/min/1 73 square meters)    Stage 3A Moderate CKD (GFR = 45-59 mL/min/1 73 square meters)    Stage 3B Moderate CKD (GFR = 30-44 mL/min/1 73 square meters)    Stage 4 Severe CKD (GFR = 15-29 mL/min/1 73 square meters)    Stage 5 End Stage CKD (GFR <15 mL/min/1 73 square meters)  Note: GFR calculation is accurate only with a steady state creatinine    CBC and differential [[id number]]  (Abnormal) Collected: 02/21/22 1301    Lab Status: Final result Specimen: Blood from Arm, Right Updated: 02/21/22 1305     WBC 9 27 Thousand/uL      RBC 3 85 Million/uL      Hemoglobin 9 7 g/dL      Hematocrit 32 3 %      MCV 84 fL      MCH 25 2 pg      MCHC 30 0 g/dL      RDW 18 6 %      MPV 11 3 fL      Platelets 683 Thousands/uL      nRBC 0 /100 WBCs      Neutrophils Relative 75 %      Immat GRANS % 0 %      Lymphocytes Relative 15 %      Monocytes Relative 8 %      Eosinophils Relative 2 %      Basophils Relative 0 %      Neutrophils Absolute 7 02 Thousands/µL      Immature Grans Absolute 0 03 Thousand/uL      Lymphocytes Absolute 1 34 Thousands/µL      Monocytes Absolute 0 71 Thousand/µL      Eosinophils Absolute 0 15 Thousand/µL      Basophils Absolute 0 02 Thousands/µL                  XR chest portable    (Results Pending)              Procedures  ECG 12 Lead Documentation Only    Date/Time: 2/21/2022 1:15 PM  Performed by: Ryan Zayas DO  Authorized by: Ryan Zayas DO     Indications / Diagnosis:  SOB  ECG reviewed by me, the ED Provider: yes    Patient location:  ED  Previous ECG:     Comparison to cardiac monitor: Yes    Interpretation:     Interpretation: non-specific    Rate:     ECG rate:  67    ECG rate assessment: normal    Rhythm:     Rhythm: sinus rhythm    Ectopy:     Ectopy: none    QRS:     QRS axis:  Normal  Conduction:     Conduction: abnormal      Abnormal conduction: incomplete RBBB    ST segments:     ST segments:  Normal  T waves:     T waves: normal               ED Course  ED Course as of 02/21/22 1359   Mon Feb 21, 2022   1336 Respiratory at bedside  No mucus was able to be suctioned  She is dry    Patient feeling significantly better after DuoNeb and Ativan   1344 Chest x-ray shows evidence of increased pulmonary vasculature  She is on Lasix and she does have bilateral leg edema which she states has worsened  Patient understands my concerns given her significant cardiac history in the setting of mild pulmonary edema  I suggested admission  Patient understands my concerns and wants go be discharged  We discussed doubling her Lasix over the next 3 days, calling her cardiologist immediately and returning for any persistent or worsening symptoms  1358 Upon further investigation, patient is no longer on Lasix  She is on spironolactone  I will give dose of 40 mg IV Lasix before she goes but discussed the importance of calling her cardiologist today                                             [facility]  Number of Diagnoses or Management Options  Diagnosis management comments: 42-year-old female presenting with shortness of breath  Patient believes this could be anxiety related  She is hyperventilating  There is a mild wheeze at bases  Will give DuoNeb     There is no clinical sign to suspect DVT  She is anxious  Will give Ativan  Respiratory called to help evaluate suctioning of the tracheostomy site  Will obtain x-ray      Disposition  Final diagnoses:   Respiratory distress   Anxiety     Time reflects when diagnosis was documented in both MDM as applicable and the Disposition within this note     Time User Action Codes Description Comment    2/21/2022  1:47 PM Tylor Caceres Add [R06 03] Respiratory distress     2/21/2022  1:47 PM Tylor Caceres Add [F41 9] Anxiety       ED Disposition     ED Disposition Condition Date/Time Comment    Discharge Stable Mon Feb 21, 2022  1:47 PM Kelli Red discharge to home/self care              Follow-up Information     Follow up With Specialties Details Why Contact Info    Governor MD [facility]   [address] [place] [address] [facility]  [phone]            Patient's Medications   Discharge Prescriptions    LORAZEPAM (ATIVAN) 1 MG TABLET    Take 0 5 tablets (0 5 mg total) by mouth 2 (two) times a day as needed for anxiety for up to 10 days       Start Date: 2/21/2022 End Date: 3/3/2022       Order Dose: 0 5 mg       Quantity: 10 tablet    Refills: 0       No discharge procedures on file      PDMP Review       Value Time User    PDMP Reviewed  Yes 3/8/2021  9:59 PM Acosta Freeman DO          ED Provider  Electronically Signed by           Tri Burgos DO  02/21/22 DO Lina  02/21/22 3571

## 2022-02-21 NOTE — DISCHARGE INSTRUCTIONS
Please return if you develop any worsening symptoms  You may return at any time if you have any further concerns  Please follow up with your doctor immediately

## 2022-02-22 LAB
ATRIAL RATE: 67 BPM
P AXIS: 8 DEGREES
PR INTERVAL: 177 MS
QRS AXIS: -33 DEGREES
QRSD INTERVAL: 157 MS
QT INTERVAL: 533 MS
QTC INTERVAL: 563 MS
T WAVE AXIS: 33 DEGREES
VENTRICULAR RATE: 67 BPM

## 2022-02-22 PROCEDURE — 93010 ELECTROCARDIOGRAM REPORT: CPT | Performed by: INTERNAL MEDICINE

## 2022-02-22 NOTE — TELEPHONE ENCOUNTER
I haven't seen this patient since she had her MI and was hospitalized, and I have the paperwork for her for [facility] but I need to know exactly what the patient is looking for regarding waiver services  Please have patient schedule a long appt asap so that the paperwork can be completed at that visit and the concerns with the DME can be addressed  It does not have to be with me, I would prefer her be seen soon if possible

## 2022-02-22 NOTE — TELEPHONE ENCOUNTER
I have asked the home care agency to evaluate while they are at the patient home for the requested(commode O2 tubing and wheelchair)items as I did not want to call Rand Harmon who is not on the trusted contact list for this patient  I do see ER prescribed the patient Ativan for anxiety which I am not sure if this will be an ongoing need and how you would like to proceed with the medication evaluation  We may need to schedule a visit but again this was not a call from a "trusted contact as stated" in the patient contact list Please review   Thank you

## 2022-02-23 ENCOUNTER — APPOINTMENT (EMERGENCY)
Dept: RADIOLOGY | Facility: HOSPITAL | Age: 57
End: 2022-02-23
Payer: MEDICARE

## 2022-02-23 ENCOUNTER — HOSPITAL ENCOUNTER (EMERGENCY)
Facility: HOSPITAL | Age: 57
Discharge: OTHER INSTITUTION | End: 2022-02-24
Attending: EMERGENCY MEDICINE
Payer: MEDICARE

## 2022-02-23 ENCOUNTER — APPOINTMENT (EMERGENCY)
Dept: CT IMAGING | Facility: HOSPITAL | Age: 57
End: 2022-02-23
Payer: MEDICARE

## 2022-02-23 DIAGNOSIS — J96.00 ACUTE RESPIRATORY FAILURE (HCC): Primary | ICD-10-CM

## 2022-02-23 DIAGNOSIS — I50.9 CHF (CONGESTIVE HEART FAILURE) (HCC): ICD-10-CM

## 2022-02-23 PROBLEM — I10 HYPERTENSION: Chronic | Status: ACTIVE | Noted: 2017-07-26

## 2022-02-23 LAB
2HR DELTA HS TROPONIN: -2 NG/L
ALBUMIN SERPL BCP-MCNC: 3.9 G/DL (ref 3.4–4.8)
ALP SERPL-CCNC: 92.1 U/L (ref 35–140)
ALT SERPL W P-5'-P-CCNC: 13 U/L (ref 5–54)
ANION GAP SERPL CALCULATED.3IONS-SCNC: 13 MMOL/L (ref 4–13)
APTT PPP: 25 SECONDS (ref 23–37)
AST SERPL W P-5'-P-CCNC: 14 U/L (ref 15–41)
BASE EX.OXY STD BLDV CALC-SCNC: 61.4 % (ref 60–80)
BASE EXCESS BLDV CALC-SCNC: -1.2 MMOL/L
BASOPHILS # BLD AUTO: 0.02 THOUSANDS/ΜL (ref 0–0.1)
BASOPHILS NFR BLD AUTO: 0 % (ref 0–1)
BILIRUB SERPL-MCNC: 0.69 MG/DL (ref 0.3–1.2)
BNP SERPL-MCNC: 463.1 PG/ML (ref 1–100)
BUN SERPL-MCNC: 13 MG/DL (ref 6–20)
CALCIUM SERPL-MCNC: 9.1 MG/DL (ref 8.4–10.2)
CARDIAC TROPONIN I PNL SERPL HS: 27 NG/L
CARDIAC TROPONIN I PNL SERPL HS: 29 NG/L
CHLORIDE SERPL-SCNC: 108 MMOL/L (ref 96–108)
CO2 SERPL-SCNC: 24 MMOL/L (ref 22–33)
CREAT SERPL-MCNC: 0.91 MG/DL (ref 0.4–1.1)
D DIMER PPP FEU-MCNC: 2.55 UG/ML FEU
EOSINOPHIL # BLD AUTO: 0.21 THOUSAND/ΜL (ref 0–0.61)
EOSINOPHIL NFR BLD AUTO: 2 % (ref 0–6)
ERYTHROCYTE [DISTWIDTH] IN BLOOD BY AUTOMATED COUNT: 18.7 % (ref 11.6–15.1)
FLUAV RNA RESP QL NAA+PROBE: NEGATIVE
FLUBV RNA RESP QL NAA+PROBE: NEGATIVE
GFR SERPL CREATININE-BSD FRML MDRD: 71 ML/MIN/1.73SQ M
GLUCOSE SERPL-MCNC: 205 MG/DL (ref 65–140)
HCO3 BLDV-SCNC: 24.2 MMOL/L (ref 24–30)
HCT VFR BLD AUTO: 34 % (ref 34.8–46.1)
HGB BLD-MCNC: 10.4 G/DL (ref 11.5–15.4)
IMM GRANULOCYTES # BLD AUTO: 0.04 THOUSAND/UL (ref 0–0.2)
IMM GRANULOCYTES NFR BLD AUTO: 0 % (ref 0–2)
INR PPP: 1.32 (ref 0.84–1.19)
LACTATE SERPL-SCNC: 1.6 MMOL/L (ref 0–2)
LACTATE SERPL-SCNC: 2.4 MMOL/L (ref 0–2)
LYMPHOCYTES # BLD AUTO: 2.16 THOUSANDS/ΜL (ref 0.6–4.47)
LYMPHOCYTES NFR BLD AUTO: 22 % (ref 14–44)
MAGNESIUM SERPL-MCNC: 1.5 MG/DL (ref 1.6–2.6)
MCH RBC QN AUTO: 25.5 PG (ref 26.8–34.3)
MCHC RBC AUTO-ENTMCNC: 30.6 G/DL (ref 31.4–37.4)
MCV RBC AUTO: 83 FL (ref 82–98)
MONOCYTES # BLD AUTO: 0.65 THOUSAND/ΜL (ref 0.17–1.22)
MONOCYTES NFR BLD AUTO: 7 % (ref 4–12)
NEUTROPHILS # BLD AUTO: 6.78 THOUSANDS/ΜL (ref 1.85–7.62)
NEUTS SEG NFR BLD AUTO: 69 % (ref 43–75)
NRBC BLD AUTO-RTO: 0 /100 WBCS
O2 CT BLDV-SCNC: 9.1 ML/DL
PCO2 BLDV: 43.1 MM HG (ref 42–50)
PH BLDV: 7.37 [PH] (ref 7.3–7.4)
PLATELET # BLD AUTO: 392 THOUSANDS/UL (ref 149–390)
PMV BLD AUTO: 11.6 FL (ref 8.9–12.7)
PO2 BLDV: 35 MM HG (ref 35–45)
POTASSIUM SERPL-SCNC: 3.3 MMOL/L (ref 3.5–5)
PROT SERPL-MCNC: 7.6 G/DL (ref 6.4–8.3)
PROTHROMBIN TIME: 16.2 SECONDS (ref 11.6–14.5)
RBC # BLD AUTO: 4.08 MILLION/UL (ref 3.81–5.12)
RSV RNA RESP QL NAA+PROBE: NEGATIVE
SARS-COV-2 RNA RESP QL NAA+PROBE: NEGATIVE
SODIUM SERPL-SCNC: 145 MMOL/L (ref 133–145)
WBC # BLD AUTO: 9.86 THOUSAND/UL (ref 4.31–10.16)

## 2022-02-23 PROCEDURE — 99291 CRITICAL CARE FIRST HOUR: CPT | Performed by: EMERGENCY MEDICINE

## 2022-02-23 PROCEDURE — 71275 CT ANGIOGRAPHY CHEST: CPT

## 2022-02-23 PROCEDURE — 82805 BLOOD GASES W/O2 SATURATION: CPT | Performed by: EMERGENCY MEDICINE

## 2022-02-23 PROCEDURE — 85025 COMPLETE CBC W/AUTO DIFF WBC: CPT | Performed by: EMERGENCY MEDICINE

## 2022-02-23 PROCEDURE — 80053 COMPREHEN METABOLIC PANEL: CPT | Performed by: EMERGENCY MEDICINE

## 2022-02-23 PROCEDURE — 83605 ASSAY OF LACTIC ACID: CPT | Performed by: EMERGENCY MEDICINE

## 2022-02-23 PROCEDURE — 87040 BLOOD CULTURE FOR BACTERIA: CPT | Performed by: EMERGENCY MEDICINE

## 2022-02-23 PROCEDURE — 83735 ASSAY OF MAGNESIUM: CPT | Performed by: EMERGENCY MEDICINE

## 2022-02-23 PROCEDURE — 12001 RPR S/N/AX/GEN/TRNK 2.5CM/<: CPT | Performed by: EMERGENCY MEDICINE

## 2022-02-23 PROCEDURE — 94760 N-INVAS EAR/PLS OXIMETRY 1: CPT

## 2022-02-23 PROCEDURE — 71045 X-RAY EXAM CHEST 1 VIEW: CPT

## 2022-02-23 PROCEDURE — 36415 COLL VENOUS BLD VENIPUNCTURE: CPT | Performed by: EMERGENCY MEDICINE

## 2022-02-23 PROCEDURE — 0241U HB NFCT DS VIR RESP RNA 4 TRGT: CPT | Performed by: EMERGENCY MEDICINE

## 2022-02-23 PROCEDURE — 85730 THROMBOPLASTIN TIME PARTIAL: CPT | Performed by: EMERGENCY MEDICINE

## 2022-02-23 PROCEDURE — 99292 CRITICAL CARE ADDL 30 MIN: CPT | Performed by: EMERGENCY MEDICINE

## 2022-02-23 PROCEDURE — 93005 ELECTROCARDIOGRAM TRACING: CPT

## 2022-02-23 PROCEDURE — 85379 FIBRIN DEGRADATION QUANT: CPT | Performed by: EMERGENCY MEDICINE

## 2022-02-23 PROCEDURE — 84484 ASSAY OF TROPONIN QUANT: CPT | Performed by: EMERGENCY MEDICINE

## 2022-02-23 PROCEDURE — 96376 TX/PRO/DX INJ SAME DRUG ADON: CPT

## 2022-02-23 PROCEDURE — 83880 ASSAY OF NATRIURETIC PEPTIDE: CPT | Performed by: EMERGENCY MEDICINE

## 2022-02-23 PROCEDURE — 96365 THER/PROPH/DIAG IV INF INIT: CPT

## 2022-02-23 PROCEDURE — 96375 TX/PRO/DX INJ NEW DRUG ADDON: CPT

## 2022-02-23 PROCEDURE — 94002 VENT MGMT INPAT INIT DAY: CPT

## 2022-02-23 PROCEDURE — 99285 EMERGENCY DEPT VISIT HI MDM: CPT

## 2022-02-23 PROCEDURE — 96366 THER/PROPH/DIAG IV INF ADDON: CPT

## 2022-02-23 PROCEDURE — 85610 PROTHROMBIN TIME: CPT | Performed by: EMERGENCY MEDICINE

## 2022-02-23 RX ORDER — MIDAZOLAM HYDROCHLORIDE 2 MG/2ML
2 INJECTION, SOLUTION INTRAMUSCULAR; INTRAVENOUS ONCE
Status: COMPLETED | OUTPATIENT
Start: 2022-02-23 | End: 2022-02-23

## 2022-02-23 RX ORDER — FENTANYL CITRATE 50 UG/ML
25 INJECTION, SOLUTION INTRAMUSCULAR; INTRAVENOUS ONCE
Status: COMPLETED | OUTPATIENT
Start: 2022-02-23 | End: 2022-02-23

## 2022-02-23 RX ORDER — MIDAZOLAM HYDROCHLORIDE 2 MG/2ML
INJECTION, SOLUTION INTRAMUSCULAR; INTRAVENOUS
Status: COMPLETED
Start: 2022-02-23 | End: 2022-02-23

## 2022-02-23 RX ORDER — NITROGLYCERIN 20 MG/100ML
5-200 INJECTION INTRAVENOUS
Status: DISCONTINUED | OUTPATIENT
Start: 2022-02-23 | End: 2022-02-23

## 2022-02-23 RX ORDER — FUROSEMIDE 10 MG/ML
40 INJECTION INTRAMUSCULAR; INTRAVENOUS ONCE
Status: COMPLETED | OUTPATIENT
Start: 2022-02-23 | End: 2022-02-23

## 2022-02-23 RX ORDER — NITROGLYCERIN 0.4 MG/1
0.4 TABLET SUBLINGUAL ONCE
Status: COMPLETED | OUTPATIENT
Start: 2022-02-23 | End: 2022-02-23

## 2022-02-23 RX ORDER — MAGNESIUM SULFATE HEPTAHYDRATE 40 MG/ML
2 INJECTION, SOLUTION INTRAVENOUS ONCE
Status: COMPLETED | OUTPATIENT
Start: 2022-02-23 | End: 2022-02-24

## 2022-02-23 RX ORDER — FENTANYL CITRATE 50 UG/ML
50 INJECTION, SOLUTION INTRAMUSCULAR; INTRAVENOUS ONCE
Status: COMPLETED | OUTPATIENT
Start: 2022-02-23 | End: 2022-02-23

## 2022-02-23 RX ORDER — POTASSIUM CHLORIDE 14.9 MG/ML
20 INJECTION INTRAVENOUS ONCE
Status: COMPLETED | OUTPATIENT
Start: 2022-02-23 | End: 2022-02-24

## 2022-02-23 RX ADMIN — MIDAZOLAM 2 MG: 1 INJECTION INTRAMUSCULAR; INTRAVENOUS at 20:27

## 2022-02-23 RX ADMIN — FUROSEMIDE 40 MG: 10 INJECTION, SOLUTION INTRAMUSCULAR; INTRAVENOUS at 20:47

## 2022-02-23 RX ADMIN — NITROGLYCERIN 1 INCH: 20 OINTMENT TOPICAL at 20:45

## 2022-02-23 RX ADMIN — MIDAZOLAM 2 MG: 1 INJECTION INTRAMUSCULAR; INTRAVENOUS at 21:57

## 2022-02-23 RX ADMIN — FENTANYL CITRATE 25 MCG: 50 INJECTION INTRAMUSCULAR; INTRAVENOUS at 20:48

## 2022-02-23 RX ADMIN — MAGNESIUM SULFATE HEPTAHYDRATE 2 G: 40 INJECTION, SOLUTION INTRAVENOUS at 22:05

## 2022-02-23 RX ADMIN — POTASSIUM CHLORIDE 20 MEQ: 14.9 INJECTION, SOLUTION INTRAVENOUS at 22:07

## 2022-02-23 RX ADMIN — MIDAZOLAM HYDROCHLORIDE 2 MG: 2 INJECTION, SOLUTION INTRAMUSCULAR; INTRAVENOUS at 21:57

## 2022-02-23 RX ADMIN — NITROGLYCERIN 0.4 MG: 0.4 TABLET SUBLINGUAL at 20:20

## 2022-02-23 RX ADMIN — IOHEXOL 100 ML: 350 INJECTION, SOLUTION INTRAVENOUS at 21:56

## 2022-02-23 RX ADMIN — FENTANYL CITRATE 50 MCG: 50 INJECTION INTRAMUSCULAR; INTRAVENOUS at 21:17

## 2022-02-23 RX ADMIN — MIDAZOLAM 2 MG: 1 INJECTION INTRAMUSCULAR; INTRAVENOUS at 21:09

## 2022-02-24 ENCOUNTER — APPOINTMENT (INPATIENT)
Dept: NON INVASIVE DIAGNOSTICS | Facility: HOSPITAL | Age: 57
DRG: 291 | End: 2022-02-24
Payer: MEDICARE

## 2022-02-24 ENCOUNTER — EPISODE CHANGES (OUTPATIENT)
Dept: CASE MANAGEMENT | Facility: OTHER | Age: 57
End: 2022-02-24

## 2022-02-24 ENCOUNTER — HOSPITAL ENCOUNTER (INPATIENT)
Facility: HOSPITAL | Age: 57
LOS: 7 days | Discharge: HOME HEALTH | DRG: 291 | End: 2022-03-03
Attending: INTERNAL MEDICINE | Admitting: INTERNAL MEDICINE
Payer: MEDICARE

## 2022-02-24 ENCOUNTER — TELEPHONE (OUTPATIENT)
Dept: FAMILY MEDICINE CLINIC | Facility: CLINIC | Age: 57
End: 2022-02-24

## 2022-02-24 VITALS
DIASTOLIC BLOOD PRESSURE: 82 MMHG | HEART RATE: 74 BPM | TEMPERATURE: 97.4 F | RESPIRATION RATE: 18 BRPM | OXYGEN SATURATION: 98 % | SYSTOLIC BLOOD PRESSURE: 149 MMHG

## 2022-02-24 DIAGNOSIS — E11.65 TYPE 2 DIABETES MELLITUS WITH HYPERGLYCEMIA, WITH LONG-TERM CURRENT USE OF INSULIN (HCC): ICD-10-CM

## 2022-02-24 DIAGNOSIS — I21.3 STEMI (ST ELEVATION MYOCARDIAL INFARCTION) (HCC): ICD-10-CM

## 2022-02-24 DIAGNOSIS — I48.91 ATRIAL FIBRILLATION, UNSPECIFIED TYPE (HCC): ICD-10-CM

## 2022-02-24 DIAGNOSIS — F41.9 ANXIETY: ICD-10-CM

## 2022-02-24 DIAGNOSIS — I47.2 VENTRICULAR TACHYCARDIA (HCC): ICD-10-CM

## 2022-02-24 DIAGNOSIS — J38.6 SUBGLOTTIC STENOSIS: ICD-10-CM

## 2022-02-24 DIAGNOSIS — Z79.4 TYPE 2 DIABETES MELLITUS WITH HYPERGLYCEMIA, WITH LONG-TERM CURRENT USE OF INSULIN (HCC): ICD-10-CM

## 2022-02-24 DIAGNOSIS — R06.03 RESPIRATORY DISTRESS: ICD-10-CM

## 2022-02-24 DIAGNOSIS — I50.23 ACUTE ON CHRONIC SYSTOLIC HEART FAILURE (HCC): Primary | ICD-10-CM

## 2022-02-24 DIAGNOSIS — I48.91 A-FIB (HCC): ICD-10-CM

## 2022-02-24 PROBLEM — I50.9 HEART FAILURE (HCC): Status: ACTIVE | Noted: 2022-02-24

## 2022-02-24 LAB
ALBUMIN SERPL BCP-MCNC: 2.6 G/DL (ref 3.5–5)
ALP SERPL-CCNC: 83 U/L (ref 46–116)
ALT SERPL W P-5'-P-CCNC: 18 U/L (ref 12–78)
ANION GAP SERPL CALCULATED.3IONS-SCNC: 8 MMOL/L (ref 4–13)
AORTIC ROOT: 3.5 CM
APICAL FOUR CHAMBER EJECTION FRACTION: 58 %
AST SERPL W P-5'-P-CCNC: 12 U/L (ref 5–45)
ATRIAL RATE: 80 BPM
BASOPHILS # BLD AUTO: 0.03 THOUSANDS/ΜL (ref 0–0.1)
BASOPHILS NFR BLD AUTO: 0 % (ref 0–1)
BILIRUB SERPL-MCNC: 0.54 MG/DL (ref 0.2–1)
BUN SERPL-MCNC: 12 MG/DL (ref 5–25)
CALCIUM ALBUM COR SERPL-MCNC: 9 MG/DL (ref 8.3–10.1)
CALCIUM SERPL-MCNC: 7.9 MG/DL (ref 8.3–10.1)
CHLORIDE SERPL-SCNC: 111 MMOL/L (ref 100–108)
CO2 SERPL-SCNC: 25 MMOL/L (ref 21–32)
CREAT SERPL-MCNC: 0.81 MG/DL (ref 0.6–1.3)
DOP CALC LVOT AREA: 3.46 CM2
DOP CALC LVOT DIAMETER: 2.1 CM
DOP CALC MV VTI: 41.65 CM
E WAVE DECELERATION TIME: 130 MS
EOSINOPHIL # BLD AUTO: 0.22 THOUSAND/ΜL (ref 0–0.61)
EOSINOPHIL NFR BLD AUTO: 3 % (ref 0–6)
ERYTHROCYTE [DISTWIDTH] IN BLOOD BY AUTOMATED COUNT: 18.7 % (ref 11.6–15.1)
FRACTIONAL SHORTENING: 32 % (ref 28–44)
GFR SERPL CREATININE-BSD FRML MDRD: 82 ML/MIN/1.73SQ M
GLUCOSE SERPL-MCNC: 143 MG/DL (ref 65–140)
GLUCOSE SERPL-MCNC: 147 MG/DL (ref 65–140)
GLUCOSE SERPL-MCNC: 181 MG/DL (ref 65–140)
GLUCOSE SERPL-MCNC: 197 MG/DL (ref 65–140)
GLUCOSE SERPL-MCNC: 212 MG/DL (ref 65–140)
HCT VFR BLD AUTO: 25.4 % (ref 34.8–46.1)
HGB BLD-MCNC: 7.7 G/DL (ref 11.5–15.4)
IMM GRANULOCYTES # BLD AUTO: 0.03 THOUSAND/UL (ref 0–0.2)
IMM GRANULOCYTES NFR BLD AUTO: 0 % (ref 0–2)
INTERVENTRICULAR SEPTUM IN DIASTOLE (PARASTERNAL SHORT AXIS VIEW): 1.3 CM (ref 0.55–1.03)
INTERVENTRICULAR SEPTUM: 1.3 CM (ref 0.6–1.1)
LAAS-AP2: 24.4 CM2
LAAS-AP4: 23.2 CM2
LEFT ATRIUM AREA SYSTOLE SINGLE PLANE A4C: 19.7 CM2
LEFT ATRIUM SIZE: 4.1 CM
LEFT INTERNAL DIMENSION IN SYSTOLE: 3.8 CM (ref 3.41–5.16)
LEFT VENTRICLE DIASTOLIC VOLUME (MOD BIPLANE): 153 ML (ref 102.55–230.95)
LEFT VENTRICLE SYSTOLIC VOLUME (MOD BIPLANE): 65 ML
LEFT VENTRICULAR INTERNAL DIMENSION IN DIASTOLE: 5.6 CM (ref 5.64–8.4)
LEFT VENTRICULAR POSTERIOR WALL IN END DIASTOLE: 1.3 CM (ref 0.53–1.01)
LEFT VENTRICULAR STROKE VOLUME: 92 ML
LV EF: 57 %
LVSV (TEICH): 92 ML
LYMPHOCYTES # BLD AUTO: 1.67 THOUSANDS/ΜL (ref 0.6–4.47)
LYMPHOCYTES NFR BLD AUTO: 19 % (ref 14–44)
MAGNESIUM SERPL-MCNC: 1.9 MG/DL (ref 1.6–2.6)
MCH RBC QN AUTO: 25.8 PG (ref 26.8–34.3)
MCHC RBC AUTO-ENTMCNC: 30.3 G/DL (ref 31.4–37.4)
MCV RBC AUTO: 85 FL (ref 82–98)
MONOCYTES # BLD AUTO: 0.62 THOUSAND/ΜL (ref 0.17–1.22)
MONOCYTES NFR BLD AUTO: 7 % (ref 4–12)
MV E'TISSUE VEL-LAT: 10 CM/S
MV E'TISSUE VEL-SEP: 6 CM/S
MV MEAN GRADIENT: 3 MMHG
MV PEAK A VEL: 0.66 M/S
MV PEAK E VEL: 107 CM/S
MV PEAK GRADIENT: 6 MMHG
MV STENOSIS PRESSURE HALF TIME: 38 MS
MV VALVE AREA P 1/2 METHOD: 5.79 CM2
NEUTROPHILS # BLD AUTO: 6.03 THOUSANDS/ΜL (ref 1.85–7.62)
NEUTS SEG NFR BLD AUTO: 71 % (ref 43–75)
NRBC BLD AUTO-RTO: 0 /100 WBCS
P AXIS: 43 DEGREES
PHOSPHATE SERPL-MCNC: 4.1 MG/DL (ref 2.7–4.5)
PLATELET # BLD AUTO: 288 THOUSANDS/UL (ref 149–390)
PMV BLD AUTO: 11.2 FL (ref 8.9–12.7)
POTASSIUM SERPL-SCNC: 3.1 MMOL/L (ref 3.5–5.3)
PR INTERVAL: 150 MS
PROCALCITONIN SERPL-MCNC: 0.1 NG/ML
PROT SERPL-MCNC: 6 G/DL (ref 6.4–8.2)
PV PEAK GRADIENT: 4 MMHG
QRS AXIS: -32 DEGREES
QRSD INTERVAL: 136 MS
QT INTERVAL: 508 MS
QTC INTERVAL: 587 MS
RBC # BLD AUTO: 2.99 MILLION/UL (ref 3.81–5.12)
RIGHT ATRIUM AREA SYSTOLE A4C: 16.8 CM2
RIGHT VENTRICLE ID DIMENSION: 3.2 CM
SL CV LEFT ATRIUM LENGTH A2C: 5.8 CM
SL CV LV DIAS VOL ENDO Z SCORE: -0.43
SL CV PED ECHO LEFT VENTRICLE DIASTOLIC VOLUME (MOD BIPLANE) 2D: 152 ML
SL CV PED ECHO LEFT VENTRICLE SYSTOLIC VOLUME (MOD BIPLANE) 2D: 60 ML
SODIUM SERPL-SCNC: 144 MMOL/L (ref 136–145)
T WAVE AXIS: 18 DEGREES
TR MAX PG: 49 MMHG
TR PEAK VELOCITY: 3.5 M/S
TRICUSPID VALVE PEAK REGURGITATION VELOCITY: 3.49 M/S
VENTRICULAR RATE: 80 BPM
WBC # BLD AUTO: 8.6 THOUSAND/UL (ref 4.31–10.16)
Z-SCORE OF INTERVENTRICULAR SEPTUM IN END DIASTOLE: 4.21
Z-SCORE OF LEFT VENTRICULAR DIMENSION IN END DIASTOLE: -2.06
Z-SCORE OF LEFT VENTRICULAR DIMENSION IN END SYSTOLE: -0.78
Z-SCORE OF LEFT VENTRICULAR POSTERIOR WALL IN END DIASTOLE: 4.32

## 2022-02-24 PROCEDURE — 84100 ASSAY OF PHOSPHORUS: CPT | Performed by: NURSE PRACTITIONER

## 2022-02-24 PROCEDURE — 94002 VENT MGMT INPAT INIT DAY: CPT

## 2022-02-24 PROCEDURE — 87040 BLOOD CULTURE FOR BACTERIA: CPT | Performed by: NURSE PRACTITIONER

## 2022-02-24 PROCEDURE — 93010 ELECTROCARDIOGRAM REPORT: CPT | Performed by: INTERNAL MEDICINE

## 2022-02-24 PROCEDURE — 94640 AIRWAY INHALATION TREATMENT: CPT

## 2022-02-24 PROCEDURE — 84145 PROCALCITONIN (PCT): CPT | Performed by: NURSE PRACTITIONER

## 2022-02-24 PROCEDURE — 99291 CRITICAL CARE FIRST HOUR: CPT | Performed by: INTERNAL MEDICINE

## 2022-02-24 PROCEDURE — 80053 COMPREHEN METABOLIC PANEL: CPT | Performed by: NURSE PRACTITIONER

## 2022-02-24 PROCEDURE — 93306 TTE W/DOPPLER COMPLETE: CPT | Performed by: INTERNAL MEDICINE

## 2022-02-24 PROCEDURE — 82948 REAGENT STRIP/BLOOD GLUCOSE: CPT

## 2022-02-24 PROCEDURE — 93306 TTE W/DOPPLER COMPLETE: CPT

## 2022-02-24 PROCEDURE — 92597 ORAL SPEECH DEVICE EVAL: CPT

## 2022-02-24 PROCEDURE — 87081 CULTURE SCREEN ONLY: CPT | Performed by: INTERNAL MEDICINE

## 2022-02-24 PROCEDURE — 99223 1ST HOSP IP/OBS HIGH 75: CPT | Performed by: INTERNAL MEDICINE

## 2022-02-24 PROCEDURE — 94760 N-INVAS EAR/PLS OXIMETRY 1: CPT

## 2022-02-24 PROCEDURE — 92610 EVALUATE SWALLOWING FUNCTION: CPT

## 2022-02-24 PROCEDURE — 85025 COMPLETE CBC W/AUTO DIFF WBC: CPT | Performed by: NURSE PRACTITIONER

## 2022-02-24 PROCEDURE — 93005 ELECTROCARDIOGRAM TRACING: CPT

## 2022-02-24 PROCEDURE — 83735 ASSAY OF MAGNESIUM: CPT | Performed by: NURSE PRACTITIONER

## 2022-02-24 RX ORDER — LORAZEPAM 0.5 MG/1
0.5 TABLET ORAL 2 TIMES DAILY PRN
Status: DISCONTINUED | OUTPATIENT
Start: 2022-02-24 | End: 2022-03-03 | Stop reason: HOSPADM

## 2022-02-24 RX ORDER — LOSARTAN POTASSIUM 25 MG/1
25 TABLET ORAL DAILY
Status: DISCONTINUED | OUTPATIENT
Start: 2022-02-24 | End: 2022-02-24

## 2022-02-24 RX ORDER — MAGNESIUM SULFATE HEPTAHYDRATE 40 MG/ML
2 INJECTION, SOLUTION INTRAVENOUS ONCE
Status: COMPLETED | OUTPATIENT
Start: 2022-02-24 | End: 2022-02-24

## 2022-02-24 RX ORDER — ONDANSETRON 2 MG/ML
4 INJECTION INTRAMUSCULAR; INTRAVENOUS EVERY 6 HOURS PRN
Status: DISCONTINUED | OUTPATIENT
Start: 2022-02-24 | End: 2022-03-03 | Stop reason: HOSPADM

## 2022-02-24 RX ORDER — LANOLIN ALCOHOL/MO/W.PET/CERES
6 CREAM (GRAM) TOPICAL
Status: DISCONTINUED | OUTPATIENT
Start: 2022-02-24 | End: 2022-03-03 | Stop reason: HOSPADM

## 2022-02-24 RX ORDER — AMIODARONE HYDROCHLORIDE 200 MG/1
200 TABLET ORAL
Status: DISCONTINUED | OUTPATIENT
Start: 2022-02-24 | End: 2022-03-03 | Stop reason: HOSPADM

## 2022-02-24 RX ORDER — ALBUTEROL SULFATE 2.5 MG/3ML
2.5 SOLUTION RESPIRATORY (INHALATION) EVERY 4 HOURS PRN
Status: DISCONTINUED | OUTPATIENT
Start: 2022-02-24 | End: 2022-03-03 | Stop reason: HOSPADM

## 2022-02-24 RX ORDER — FUROSEMIDE 10 MG/ML
40 INJECTION INTRAMUSCULAR; INTRAVENOUS
Status: DISCONTINUED | OUTPATIENT
Start: 2022-02-24 | End: 2022-03-01

## 2022-02-24 RX ORDER — ATORVASTATIN CALCIUM 40 MG/1
40 TABLET, FILM COATED ORAL DAILY
Status: DISCONTINUED | OUTPATIENT
Start: 2022-02-24 | End: 2022-03-03 | Stop reason: HOSPADM

## 2022-02-24 RX ORDER — LEVALBUTEROL 1.25 MG/.5ML
1.25 SOLUTION, CONCENTRATE RESPIRATORY (INHALATION)
Status: DISCONTINUED | OUTPATIENT
Start: 2022-02-24 | End: 2022-03-03 | Stop reason: HOSPADM

## 2022-02-24 RX ORDER — CHLORHEXIDINE GLUCONATE 0.12 MG/ML
15 RINSE ORAL EVERY 12 HOURS SCHEDULED
Status: DISCONTINUED | OUTPATIENT
Start: 2022-02-24 | End: 2022-03-03 | Stop reason: HOSPADM

## 2022-02-24 RX ORDER — FUROSEMIDE 10 MG/ML
80 INJECTION INTRAMUSCULAR; INTRAVENOUS
Status: DISCONTINUED | OUTPATIENT
Start: 2022-02-24 | End: 2022-02-24

## 2022-02-24 RX ORDER — ACETAMINOPHEN 160 MG/5ML
975 SUSPENSION, ORAL (FINAL DOSE FORM) ORAL EVERY 6 HOURS PRN
Status: DISCONTINUED | OUTPATIENT
Start: 2022-02-24 | End: 2022-03-03 | Stop reason: HOSPADM

## 2022-02-24 RX ORDER — AMOXICILLIN 250 MG
1 CAPSULE ORAL
Status: DISCONTINUED | OUTPATIENT
Start: 2022-02-24 | End: 2022-03-03 | Stop reason: HOSPADM

## 2022-02-24 RX ORDER — POTASSIUM CHLORIDE 14.9 MG/ML
20 INJECTION INTRAVENOUS ONCE
Status: COMPLETED | OUTPATIENT
Start: 2022-02-24 | End: 2022-02-24

## 2022-02-24 RX ORDER — POTASSIUM CHLORIDE 20 MEQ/1
40 TABLET, EXTENDED RELEASE ORAL ONCE
Status: DISCONTINUED | OUTPATIENT
Start: 2022-02-24 | End: 2022-02-24

## 2022-02-24 RX ORDER — POLYETHYLENE GLYCOL 3350 17 G/17G
17 POWDER, FOR SOLUTION ORAL DAILY
Status: DISCONTINUED | OUTPATIENT
Start: 2022-02-24 | End: 2022-03-03 | Stop reason: HOSPADM

## 2022-02-24 RX ORDER — PREGABALIN 75 MG/1
75 CAPSULE ORAL DAILY
Status: DISCONTINUED | OUTPATIENT
Start: 2022-02-24 | End: 2022-03-03 | Stop reason: HOSPADM

## 2022-02-24 RX ORDER — QUETIAPINE FUMARATE 25 MG/1
25 TABLET, FILM COATED ORAL
Status: DISCONTINUED | OUTPATIENT
Start: 2022-02-24 | End: 2022-03-03 | Stop reason: HOSPADM

## 2022-02-24 RX ORDER — LORAZEPAM 2 MG/ML
0.5 INJECTION INTRAMUSCULAR ONCE
Status: DISCONTINUED | OUTPATIENT
Start: 2022-02-24 | End: 2022-02-24

## 2022-02-24 RX ORDER — POTASSIUM CHLORIDE 20MEQ/15ML
40 LIQUID (ML) ORAL ONCE
Status: COMPLETED | OUTPATIENT
Start: 2022-02-24 | End: 2022-02-24

## 2022-02-24 RX ORDER — FUROSEMIDE 10 MG/ML
40 INJECTION INTRAMUSCULAR; INTRAVENOUS
Status: DISCONTINUED | OUTPATIENT
Start: 2022-02-24 | End: 2022-02-24

## 2022-02-24 RX ORDER — BUDESONIDE 0.5 MG/2ML
0.5 INHALANT ORAL
Status: DISCONTINUED | OUTPATIENT
Start: 2022-02-24 | End: 2022-03-03 | Stop reason: HOSPADM

## 2022-02-24 RX ORDER — CHLORHEXIDINE GLUCONATE 0.12 MG/ML
15 RINSE ORAL EVERY 12 HOURS SCHEDULED
Status: DISCONTINUED | OUTPATIENT
Start: 2022-02-24 | End: 2022-02-24 | Stop reason: SDUPTHER

## 2022-02-24 RX ORDER — LOSARTAN POTASSIUM 50 MG/1
50 TABLET ORAL DAILY
Status: DISCONTINUED | OUTPATIENT
Start: 2022-02-25 | End: 2022-03-03 | Stop reason: HOSPADM

## 2022-02-24 RX ORDER — FAMOTIDINE 40 MG/5ML
20 POWDER, FOR SUSPENSION ORAL 2 TIMES DAILY
Status: DISCONTINUED | OUTPATIENT
Start: 2022-02-24 | End: 2022-03-03 | Stop reason: HOSPADM

## 2022-02-24 RX ADMIN — LORAZEPAM 0.5 MG: 0.5 TABLET ORAL at 20:34

## 2022-02-24 RX ADMIN — FAMOTIDINE 20 MG: 40 POWDER, FOR SUSPENSION ORAL at 17:15

## 2022-02-24 RX ADMIN — AMIODARONE HYDROCHLORIDE 200 MG: 200 TABLET ORAL at 09:17

## 2022-02-24 RX ADMIN — INSULIN LISPRO 2 UNITS: 100 INJECTION, SOLUTION INTRAVENOUS; SUBCUTANEOUS at 06:17

## 2022-02-24 RX ADMIN — IPRATROPIUM BROMIDE 0.5 MG: 0.5 SOLUTION RESPIRATORY (INHALATION) at 20:04

## 2022-02-24 RX ADMIN — Medication 6 MG: at 22:14

## 2022-02-24 RX ADMIN — Medication 6 MG: at 01:16

## 2022-02-24 RX ADMIN — IPRATROPIUM BROMIDE 0.5 MG: 0.5 SOLUTION RESPIRATORY (INHALATION) at 09:03

## 2022-02-24 RX ADMIN — ATORVASTATIN CALCIUM 40 MG: 40 TABLET, FILM COATED ORAL at 09:21

## 2022-02-24 RX ADMIN — METOPROLOL TARTRATE 25 MG: 25 TABLET, FILM COATED ORAL at 09:33

## 2022-02-24 RX ADMIN — LOSARTAN POTASSIUM 25 MG: 25 TABLET, FILM COATED ORAL at 09:21

## 2022-02-24 RX ADMIN — METOPROLOL TARTRATE 25 MG: 25 TABLET, FILM COATED ORAL at 20:34

## 2022-02-24 RX ADMIN — FUROSEMIDE 40 MG: 10 INJECTION, SOLUTION INTRAMUSCULAR; INTRAVENOUS at 16:38

## 2022-02-24 RX ADMIN — POTASSIUM CHLORIDE 20 MEQ: 14.9 INJECTION, SOLUTION INTRAVENOUS at 07:46

## 2022-02-24 RX ADMIN — CHLORHEXIDINE GLUCONATE 0.12% ORAL RINSE 15 ML: 1.2 LIQUID ORAL at 20:33

## 2022-02-24 RX ADMIN — TICAGRELOR 90 MG: 90 TABLET ORAL at 01:35

## 2022-02-24 RX ADMIN — ALBUTEROL SULFATE 2.5 MG: 2.5 SOLUTION RESPIRATORY (INHALATION) at 01:10

## 2022-02-24 RX ADMIN — MAGNESIUM SULFATE HEPTAHYDRATE 2 G: 40 INJECTION, SOLUTION INTRAVENOUS at 07:45

## 2022-02-24 RX ADMIN — APIXABAN 5 MG: 5 TABLET, FILM COATED ORAL at 17:15

## 2022-02-24 RX ADMIN — METOPROLOL TARTRATE 25 MG: 25 TABLET, FILM COATED ORAL at 01:17

## 2022-02-24 RX ADMIN — FUROSEMIDE 40 MG: 10 INJECTION, SOLUTION INTRAMUSCULAR; INTRAVENOUS at 08:01

## 2022-02-24 RX ADMIN — APIXABAN 5 MG: 5 TABLET, FILM COATED ORAL at 09:21

## 2022-02-24 RX ADMIN — QUETIAPINE FUMARATE 25 MG: 25 TABLET ORAL at 01:17

## 2022-02-24 RX ADMIN — FAMOTIDINE 20 MG: 40 POWDER, FOR SUSPENSION ORAL at 09:24

## 2022-02-24 RX ADMIN — TICAGRELOR 90 MG: 90 TABLET ORAL at 20:34

## 2022-02-24 RX ADMIN — LEVALBUTEROL HYDROCHLORIDE 1.25 MG: 1.25 SOLUTION, CONCENTRATE RESPIRATORY (INHALATION) at 13:41

## 2022-02-24 RX ADMIN — QUETIAPINE FUMARATE 25 MG: 25 TABLET ORAL at 22:14

## 2022-02-24 RX ADMIN — BUDESONIDE 0.5 MG: 0.5 INHALANT ORAL at 13:42

## 2022-02-24 RX ADMIN — POTASSIUM CHLORIDE 40 MEQ: 20 SOLUTION ORAL at 09:39

## 2022-02-24 RX ADMIN — TICAGRELOR 90 MG: 90 TABLET ORAL at 09:33

## 2022-02-24 RX ADMIN — PREGABALIN 75 MG: 75 CAPSULE ORAL at 09:21

## 2022-02-24 RX ADMIN — IPRATROPIUM BROMIDE 0.5 MG: 0.5 SOLUTION RESPIRATORY (INHALATION) at 13:41

## 2022-02-24 RX ADMIN — LEVALBUTEROL HYDROCHLORIDE 1.25 MG: 1.25 SOLUTION, CONCENTRATE RESPIRATORY (INHALATION) at 09:03

## 2022-02-24 RX ADMIN — LEVALBUTEROL HYDROCHLORIDE 1.25 MG: 1.25 SOLUTION, CONCENTRATE RESPIRATORY (INHALATION) at 20:04

## 2022-02-24 RX ADMIN — BUDESONIDE 0.5 MG: 0.5 INHALANT ORAL at 20:04

## 2022-02-24 RX ADMIN — CHLORHEXIDINE GLUCONATE 0.12% ORAL RINSE 15 ML: 1.2 LIQUID ORAL at 09:33

## 2022-02-24 RX ADMIN — INSULIN LISPRO 2 UNITS: 100 INJECTION, SOLUTION INTRAVENOUS; SUBCUTANEOUS at 11:32

## 2022-02-24 NOTE — ASSESSMENT & PLAN NOTE
· Home medications include cozaar, furosemide  Also on metoprolol for afib  · Continue home meds   Increase furosemide to BID   · BP currently controlled  · Goal SBP <160

## 2022-02-24 NOTE — TELEPHONE ENCOUNTER
Physician certification form needing to be completed  Placed in Dr Murry Coil folder in preceptor room  Please advise, thank you!

## 2022-02-24 NOTE — H&P
[address] [facility] 1965, 55 y o  female MRN: [id number]  Unit/Bed#: ICU 12 Encounter: [id number]  Primary Care Provider: Wayne Reed MD   Date and time admitted to hospital: 2/24/2022 12:26 AM    Acute hypoxemic respiratory failure [facility])  Assessment & Plan  · Hx of respiratory arrest last month, admitted to [facility] 25  S/p T&P  On 10L FiO2 via trach collar @ baseline  · Presentated to ED 2/22 for SOB, thought 2/2 anxiety +/- CHF exacerbation  Was given lasix, nebs, lorazepam and symptoms improved  Was recommended for admission, but requested to leave  · Presented [facility] 2/23 via her son with c/o acute onset SOB  · SpO2 89-92% on arrival to ED  Placed on PS 10/6 and 50%  SpO2 and WOB improved to 100%  · NT-proBNP 463, history of heart failure EF 40%  Reports recent increase in peripheral edema  Reports taking lasix 40mg daily  Unable to verify meds with son  Left message on cell phone  · D-dimer 2 55  Is on Eliquis OP for history of Afib  Reports compliance  · CTA chest 2/23: "No PE  Mild diffuse ground glass opacities in the lungs may be due to pulmonary edema  Small bilateral pleural effusions  Cardiomegaly  Correlate for heart failure  Patchy opacities at the bilateral lower lobe bases and within the posterior left upper lobe which may be due to atelectasis and/or pneumonia"  · S/p furosemide 40mg x1 in ED  UOP 650ml in ED and another 300ml in ICU  · Respiratory failure likely 2/2 heart failure, see plan below  · Afebrile  No leukocytosis  Doubt pulmonary infection  Recently treated w/10 days of doxycycline for R  Lung bulla vs  Pneumatocele  Monitor off of antibiotics  Obtain MRSA swab and repeat BC's    · Check AM procal, trend temperature curve and CBC  · Wean FiO2 to goal SpO2 >90%  · Was anxious on vent upon arrival to ICU   Placed on 50% Trach collar, had immediate improvement in anxiety and WOB  · Increase lasix dosing to 40mg IV BID in AM    Heart failure [facility])  Assessment & Plan  Wt Readings from Last 3 Encounters:   11/23/21 87 5 kg (192 lb 14 4 oz)   11/04/21 91 3 kg (201 lb 4 5 oz)   09/28/21 92 5 kg (204 lb)     · History of heart failure and complicated cardiac history, CAD s/p stent, VT arrests, currently on life vest pending ICD placement  Prior ICD not placed in January of this year 2/2 infected R  lung bullae  S/p Linezolid then doxycycline 4 week course  · Last ECHO 10/30/21: EF 40%, akinesis of the entire inferior and anterolateral portion of the LV  Wall thickness is moderately increased  · Home medications include metoprolol 25mg PO BID and furosemide 40mg PO daily? -Need to confirm dose with son  Is also on Losartan 150mg PO daily   · CTA showing b/l pleural effusions and b/l opacities possibly 2/2 pulmonary edema  · Reports increasing lower extremity edema  Unclear compliance with low sodium diet  Will discuss with son today  · S/p extra dose of lasix 40mg IV in ED, diuresed 650ml and 300ml urine  · ECHO ordered   · Admission weight 91 1kg  · Daily weights  · NPO for now  Dietary sodium restriction when taking PO  Patient does eat despite trach in place  Consider formal swallow eval if difficulty passing bedside swallow   · Close I&O monitoring  · Consult Cardiology in AM        CAD (coronary artery disease)  Assessment & Plan  · Admitted for STEMI SLB 10/22-11/23/21  · S/p cardiac cath "mid LCX culprit lesion at bifurcation of the OM2 both vessels were small in caliber, t/w 2 5 x 28 mm Xience PATRICA, OM2 was ballooned but not stented; mild nonobstructive disease in LAD and RCA"  · On daily Eliquis, ticagrelor, statin  · EKG NSR with R  BBB  No ST changes  · Troponin negative x2  No further trending  · Continuous cardiac monitoring  Life vest removed on admission      A-fib [facility])  Assessment & Plan  · History of afib on Eliquis  · Currently in NSR with R  BBB  · Continuous cardiac monitoring  · Continue Eliquis    History of Ventricular tachycardia ([facility] [address] )  Assessment & Plan  · Significant cardiac history with multiple recent hospitalizations  Had prolonged hospitalization at [facility] secondary to STEMI 10/22/21 s/p PATRICA to mid LCx complicated by cardiogenic shock and cardiac arrest secondary to VT x 2  Was d/c to rehab with T&P  · Presented to [facility] on 1/1/2022 from [place] [address] with cardiac arrest  She had ROSC pre hospital after AED shock x1  Seen by cardiology and did not feel arrest was ischemia-driven  Recommended ICD placement for secondary prevention  Patient's intervention deferred in setting of infected R  Lung bullae  Now s/p treatment x10D with doxycycline  · Discharged from [facility] on [address] 2/10 with plan for elective ICD at 4 weeks  Denies any shocks delivered since vest initiation  · Records show Amiodarone 200mg PO BID starting 11/23  Unclear if continued in rehab  Will need to clarify with son/pharmacy-[facility] in am- [phone]  Will order daily for now and increase to BID if needs loading  · Continuous cardiac monitoring  · Goal K>4, Mg>2      Uncontrolled type 2 diabetes mellitus with complication, with long-term current use of insulin ([facility])  Assessment & Plan  Lab Results   Component Value Date    HGBA1C 6 6 (H) 01/01/2022       No results for input(s): POCGLU in the last 72 hours  Blood Sugar Average: Last 72 hrs:  · Discharged from inpatient rehab on Basaglar 8 units daily and Apidra 2 units tid   · Hold long acting insulin for now given NPO status  · q6h BG monitoring with SSI   · Goal -180    History of Cardiac arrest [facility])  Assessment & Plan  · See plan for Ventricular tachycardia above      Hyperlipidemia associated with type 2 diabetes mellitus ([facility] )  Assessment & Plan  Lab Results   Component Value Date    HGBA1C 6 6 (H) 01/01/2022       · Continue atorvastatin  · DM treatment per above plan       Hypertension  Assessment & Plan  · Home medications include cozaar, furosemide   Also on metoprolol for afib  · Continue home meds  Increase furosemide to BID   · BP currently controlled  · Goal SBP <160    Anxiety  Assessment & Plan  · Severely anxious when dyspneic on arrival to ED and ICU  · Takes lorazepam 0 5mg PO BID OP  · Anxiety much improved after transition to trach collar  · Will continue PRN lorazepam       -------------------------------------------------------------------------------------------------------------  Chief Complaint: SOB    History of Present Illness   HX and PE limited by: trach in place  Michelle Roa is a 55 y o  female with PMHx of HFrEF, CAD s/p PATRICA to LCx, Afib on Eliquis, HTN, HLD, anxiety, type 2 DM, prior cardiac arrest and VT on lifevest  She has had multiple hospitalizations since the end of last year after experiencing a STEMI in October  Had a prolonged hospitalization 71/41-83/26 complicated by cardiogenic shock, VT arrest x2, and sepsis  Required T&P at that time and was d/c to [facility]  She then experienced cardiac arrest 1/1/22 while at [facility], had ROSC pre hospital after AED shock x1  Was admitted to [facility] post arrest  Was recommended by cardiology for ICD, however had an infectious pulmonary bullae on initial CT scan  Was treated with linezolid then doxycycline 4 week course and recommended for life vest followed by elective ICD after completion of antibiotics and repeat imaging  Was admitted to IP rehab 1/20, and finally d/c home 2/10 on life vest with trach in place  Was seen in [facility] ED for acute SOB 2/22, received lasix, neb tx, and lorazepam and SOB improved  Admission was recommended at that time due to concerns for CHF but patient declined  She presented again for worsening SOB and anxiety last evening and required multiple doses of lorazepam and versed  She was connected to the vent on PS 10/6 and 50% and was given 40mg lasix IVP  Diuresed 650ml urine  CT imaging showed b/l GGO concerning for pulmonary edema and bilateral pleural effusions   Labs were significant for lactic acid of 2 4, which has since cleared  NT-pro BNP was 463, troponin was negative  She was transferred to [facility] ICU for further management  History obtained from chart review and unobtainable from patient due to tracheostomy and anxiety  Attempted to call son but he did not answer  Message was left on his cell phone with ICU call back number  -------------------------------------------------------------------------------------------------------------  Dispo: Admit to Critical Care     Code Status: Level 1 - Full Code  --------------------------------------------------------------------------------------------------------------  Review of Systems   Unable to perform ROS: Severe respiratory distress (Limited in setting of trach, respiratory distress, anxiety)   Constitutional: Negative for chills, fatigue and fever  HENT: Negative  Respiratory: Positive for chest tightness and shortness of breath  Negative for cough and wheezing  Mouths the words "I know my number is OK but I feel like I can't get enough air"   Cardiovascular: Positive for leg swelling  Negative for chest pain and palpitations  Unsure if she gained weight  Does not weight herself at home  Gastrointestinal: Negative for abdominal pain  Patient reports she eats at home  PEG previously removed  Unable to assess diet or sodium restriction compliance  Genitourinary: Negative  Musculoskeletal: Negative  Skin: Negative  Neurological: Negative for dizziness and weakness  Psychiatric/Behavioral: The patient is nervous/anxious  Review of systems was limited secondary to tracheostomy, increased WOB and anxiety  Physical Exam  Vitals and nursing note reviewed  Constitutional:       General: She is in acute distress (anxious/increased WOB on vent  Improved when transitioned to trach collar)  Appearance: She is ill-appearing  Comments: Trach in place   Small sanguineous drainage around site   HENT:      Head: Normocephalic and atraumatic  Nose: Nose normal       Mouth/Throat:      Mouth: Mucous membranes are moist    Eyes:      General: Lids are normal       Pupils: Pupils are equal, round, and reactive to light  Cardiovascular:      Rate and Rhythm: Normal rate and regular rhythm  Pulses:           Radial pulses are 2+ on the right side and 2+ on the left side  Heart sounds: Normal heart sounds  Comments: Painful lower extremity edema  Will not allow placement of SCDs  Pulmonary:      Effort: Tachypnea, accessory muscle usage and respiratory distress present  Breath sounds: Wheezing (RT initiating breathing treatment ) present  No rales (No audible rales)  Comments: Trach in place  Abdominal:      General: Bowel sounds are normal       Palpations: Abdomen is soft  Tenderness: There is no abdominal tenderness  There is no guarding  Comments: Prior PEG site healing/scabbed  No drainage  Genitourinary:     Comments: Voiding clear yellow urine  Musculoskeletal:      Cervical back: Neck supple  Right lower leg: 3+ Edema present  Left lower leg: 3+ Edema present  Skin:     General: Skin is warm and dry  Capillary Refill: Capillary refill takes less than 2 seconds  Comments: Petechiae of left lower arm   Neurological:      General: No focal deficit present  Mental Status: She is alert  GCS: GCS eye subscore is 4  GCS verbal subscore is 1  GCS motor subscore is 6  Motor: Motor function is intact  Comments: GCS 11T   Appears oriented to place/situation on exam     Psychiatric:         Attention and Perception: Attention normal          Mood and Affect: Mood is anxious        --------------------------------------------------------------------------------------------------------------  Vitals:   Vitals:    02/24/22 0046 02/24/22 0100 02/24/22 0110 02/24/22 0117   BP:  155/83  155/83   BP Location:  Left arm Pulse: 75 71  79   Resp: 22 21     Temp:  97 7 °F (36 5 °C)     TempSrc:  Temporal     SpO2: 100% 100% 100%    Weight:  91 2 kg (201 lb 1 oz)     Height:  5' 10" (1 778 m)       Temp  Min: 97 4 °F (36 3 °C)  Max: 97 7 °F (36 5 °C)     Height: 5' 10" (177 8 cm)  Body mass index is 28 85 kg/m²  Laboratory and Diagnostics:  Results from last 7 days   Lab Units 02/23/22 2038 02/21/22  1301   WBC Thousand/uL 9 86 9 27   HEMOGLOBIN g/dL 10 4* 9 7*   HEMATOCRIT % 34 0* 32 3*   PLATELETS Thousands/uL 392* 307   NEUTROS PCT % 69 75   MONOS PCT % 7 8     Results from last 7 days   Lab Units 02/23/22 2038 02/21/22  1301   SODIUM mmol/L 145 143   POTASSIUM mmol/L 3 3* 3 5   CHLORIDE mmol/L 108 109*   CO2 mmol/L 24 22   ANION GAP mmol/L 13 12   BUN mg/dL 13 13   CREATININE mg/dL 0 91 0 72   CALCIUM mg/dL 9 1 8 8   GLUCOSE RANDOM mg/dL 205* 199*   ALT U/L 13 10   AST U/L 14* 10*   ALK PHOS U/L 92 1 86 5   ALBUMIN g/dL 3 9 3 6   TOTAL BILIRUBIN mg/dL 0 69 0 87     Results from last 7 days   Lab Units 02/23/22 2038   MAGNESIUM mg/dL 1 5*      Results from last 7 days   Lab Units 02/23/22 2038   INR  1 32*   PTT seconds 25          Results from last 7 days   Lab Units 02/23/22 2220 02/23/22 2038   LACTIC ACID mmol/L 1 6 2 4*     ABG:    VBG:  Results from last 7 days   Lab Units 02/23/22 2038   PH FRANCO  7 367   PCO2 FRANCO mm Hg 43 1   PO2 FRANCO mm Hg 35 0   HCO3 FRANCO mmol/L 24 2   BASE EXC FRANCO mmol/L -1 2           Micro:  Results from last 7 days   Lab Units 02/23/22 2038   BLOOD CULTURE  Received in Microbiology Lab  Culture in Progress  EKG: NSR on monitor, rate 70-80's  Imaging: I have personally reviewed pertinent reports  and I have personally reviewed pertinent films in PACS  CTA chest 2/23/22:  1  No evidence of pulmonary embolism  2   Mild diffuse ground glass opacities in the lungs may be due to pulmonary edema  Small bilateral pleural effusions  Cardiomegaly  Correlate for heart failure    3  Patchy opacities at the bilateral lower lobe bases and within the posterior left upper lobe which may be due to atelectasis and/or pneumonia in the appropriate clinical setting  Historical Information   Past Medical History:   Diagnosis Date    Anxiety     Aortic aneurysm ([address] )     Arthritis     Depression     Diabetes mellitus (HCC)     Fibromyalgia     GERD (gastroesophageal reflux disease)     GERD (gastroesophageal reflux disease)     H/O cardiovascular stress test 2018    no ischemia  EF 70%   H/O echocardiogram 2019    EF 60%  Mild LVH  trivial effusion   Hyperlipidemia     Hypertension     Migraines     Psychiatric disorder     anxiety    Uncontrolled hypertension 2015    Last Assessment & Plan:  BP today above goal <140/90, apparently asymptomatic  Prior BP elevations were attributed to not taking medication  Consider increased medication if persistent on f/u  Past Surgical History:   Procedure Laterality Date    BACK SURGERY      Lumbar epidural steroid injection    CARDIAC CATHETERIZATION N/A 10/22/2021    Procedure: Cardiac pci;  Surgeon: Buddy Carreon MD;  Location: [facility] CARD[facility]; Service: Cardiology    CARDIAC CATHETERIZATION  10/22/2021    Procedure: Cardiac catheterization;  Surgeon: Buddy Carreon MD;  [facility]; Service: Cardiology    CARDIAC CATHETERIZATION Left 10/27/2021    Procedure: Cardiac Left Heart Cath;  Surgeon: Lennox Son MD;  [facility]; Service: Cardiology    CARDIAC CATHETERIZATION N/A 10/27/2021    Procedure: Cardiac pci;  Surgeon: Lennox Son MD;  [facility]: [facility]; Service: Cardiology    CARDIAC CATHETERIZATION N/A 10/28/2021    Procedure: Cardiac pci;  Surgeon: Dimitris Franz DO;  Location: [facility];   Service: Cardiology    CARPAL TUNNEL RELEASE Left      SECTION      CHOLECYSTECTOMY      COLONOSCOPY      incomplete    COLONOSCOPY  EYE SURGERY      HYSTERECTOMY      Total    OVARIAN CYST REMOVAL      PEG W/TRACHEOSTOMY PLACEMENT N/A 11/12/2021    Procedure: TRACHEOSTOMY WITH INSERTION PEG TUBE;  Surgeon: Edmundo Ribeiro To, DO;  Location: [facility] MAIN OR;  Service: General    TUBAL LIGATION      UPPER GASTROINTESTINAL ENDOSCOPY       Social History   Social History     Substance and Sexual Activity   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        Social History     Substance and Sexual Activity   Drug Use Yes    Types: Marijuana    Comment: medical; seldom use     Social History     Tobacco Use   Smoking Status Former Smoker    Packs/day: 1 00    Years: 30 00    Pack years: 30 00    Types: Cigarettes   Smokeless Tobacco Never Used     Exercise History: Wheelchair bound  Family History:   Family History   Problem Relation Age of Onset    Hypertension Mother     Arthritis Mother     Diabetes Father     Other Sister         renal cell carcinoma    Diabetes Other     Factor V Leiden deficiency Other      I have reviewed this patient's family history and commented on sigificant items within the HPI      Medications:  Current Facility-Administered Medications   Medication Dose Route Frequency    acetaminophen (TYLENOL) oral suspension 975 mg  975 mg Per G Tube Q6H PRN    albuterol inhalation solution 2 5 mg  2 5 mg Nebulization Q4H PRN    amiodarone tablet 200 mg  200 mg Oral Daily With Breakfast    apixaban (ELIQUIS) tablet 5 mg  5 mg Oral BID    atorvastatin (LIPITOR) tablet 40 mg  40 mg Oral Daily    chlorhexidine (PERIDEX) 0 12 % oral rinse 15 mL  15 mL Mouth/Throat Q12H [facility]    famotidine (PEPCID) oral suspension 20 mg  20 mg Oral BID    furosemide (LASIX) injection 40 mg  40 mg Intravenous BID (diuretic)    insulin lispro (HumaLOG) 100 units/mL subcutaneous injection 1-6 Units  1-6 Units Subcutaneous Q6H [facility]    ipratropium (ATROVENT) 0 02 % inhalation solution 0 5 mg  0 5 mg Nebulization TID    levalbuterol (XOPENEX) inhalation solution 1 25 mg  1 25 mg Nebulization TID    LORazepam (ATIVAN) tablet 0 5 mg  0 5 mg Oral BID PRN    losartan (COZAAR) tablet 25 mg  25 mg Oral Daily    melatonin tablet 6 mg  6 mg Oral HS    metoprolol tartrate (LOPRESSOR) tablet 25 mg  25 mg Oral Q12H [facility]    ondansetron (ZOFRAN) injection 4 mg  4 mg Intravenous Q6H PRN    polyethylene glycol (MIRALAX) packet 17 g  17 g Oral Daily    pregabalin (LYRICA) capsule 75 mg  75 mg Oral Daily    QUEtiapine (SEROquel) tablet 25 mg  25 mg Oral HS    senna-docusate sodium (SENOKOT S) 8 6-50 mg per tablet 1 tablet  1 tablet Oral HS    ticagrelor (BRILINTA) tablet 90 mg  90 mg Oral Q12H [facility]     Home medications:  Prior to Admission Medications   Prescriptions Last Dose Informant Patient Reported? Taking?    Blood Pressure Monitoring (Sphygmomanometer) MISC  Self No No   Sig: Use 2 (two) times a day   Patient not taking: Reported on 2021   Insulin Pen Needle (UNIFINE PENTIPS PLUS) 31G X 6 MM MISC  Self Yes No   Si Device by Does not apply route 4 (four) times a day   LORazepam (Ativan) 1 mg tablet   No No   Sig: Take 0 5 tablets (0 5 mg total) by mouth 2 (two) times a day as needed for anxiety for up to 10 days   Lancets MISC  Self Yes No   Si Device by Does not apply route 4 (four) times a day   Patient not taking: Reported on 2021   QUEtiapine (SEROquel) 25 mg tablet   No No   Sig: Take 1 tablet (25 mg total) by mouth daily at bedtime   acetaminophen (TYLENOL) 160 mg/5 mL suspension   No No   Si 4 mL (975 mg total) by Per G Tube route every 6 (six) hours as needed for mild pain or fever   albuterol (2 5 mg/3 mL) 0 083 % nebulizer solution   No No   Sig: Take 3 mL (2 5 mg total) by nebulization every 4 (four) hours as needed for wheezing   amiodarone 200 mg tablet   No No   Si tablet (200 mg total) by Per G Tube route 2 (two) times a day with meals   apixaban (ELIQUIS) 5 mg   No No   Sig: Take 1 tablet (5 mg total) by mouth 2 (two) times a day   apixaban (Eliquis) 5 mg   No No   Sig: Take 1 tablet (5 mg total) by mouth 2 (two) times a day for 7 days   atorvastatin (LIPITOR) 40 mg tablet   No No   Sig: TAKE 1 TABLET BY MOUTH EVERY DAY   chlorhexidine (PERIDEX) 0 12 % solution   No No   Sig: Apply 15 mL to the mouth or throat every 12 (twelve) hours   famotidine (PEPCID) 20 mg/2 5 mL oral suspension   No No   Sig: Take 2 5 mL (20 mg total) by mouth 2 (two) times a day   furosemide (LASIX) 10 mg/mL oral solution   No No   Si mL (40 mg total) by Per G Tube route 2 (two) times a day   insulin regular (HumuLIN R,NovoLIN R) 100 units/mL injection   No No   Sig: Inject 0 02-0 1 mL (2-10 Units total) under the skin every 6 (six) hours   insulin regular (HumuLIN R,NovoLIN R) 100 units/mL injection   No No   Sig: Inject 0 3 mL (30 Units total) under the skin every 6 (six) hours   ipratropium (ATROVENT) 0 02 % nebulizer solution   No No   Sig: Take 2 5 mL (0 5 mg total) by nebulization 3 (three) times a day   levalbuterol (XOPENEX) 1 25 mg/0 5 mL nebulizer solution   No No   Sig: Take 0 5 mL (1 25 mg total) by nebulization 3 (three) times a day   losartan (COZAAR) 25 mg tablet   No No   Sig: Take 1 tablet (25 mg total) by mouth daily   melatonin 3 mg   No No   Sig: Take 2 tablets (6 mg total) by mouth daily at bedtime   metoprolol tartrate (LOPRESSOR) 25 mg tablet   No No   Sig: Take 1 tablet (25 mg total) by mouth every 12 (twelve) hours   ondansetron (ZOFRAN) 4 mg/2 mL injection   No No   Sig: Infuse 2 mL (4 mg total) into a venous catheter every 6 (six) hours as needed for nausea or vomiting   polyethylene glycol (MIRALAX) 17 g packet   No No   Sig: Take 17 g by mouth daily   potassium chloride 10% oral solution   No No   Sig: Take 15 mL (20 mEq total) by mouth daily   pregabalin (LYRICA) 75 mg capsule  Self No No   Sig: TAKE ONE CAPSULE EVERY DAY   senna-docusate sodium (SENOKOT S) 8 6-50 mg per tablet   No No   Sig: Take 1 tablet by mouth daily at bedtime   ticagrelor (BRILINTA) 90 MG   No No   Sig: Take 1 tablet (90 mg total) by mouth every 12 (twelve) hours      Facility-Administered Medications: None     Allergies: Allergies   Allergen Reactions    Metformin Diarrhea    Clonidine Rash     Patch        ------------------------------------------------------------------------------------------------------------  Advance Directive and Living Will:      Power of :    POLST:    ------------------------------------------------------------------------------------------------------------  Anticipated Length of Stay is > 2 midnights    Care Time Delivered:   No Critical Care time spent       [facility]        Portions of the record may have been created with voice recognition software  Occasional wrong word or "sound a like" substitutions may have occurred due to the inherent limitations of voice recognition software    Read the chart carefully and recognize, using context, where substitutions have occurred

## 2022-02-24 NOTE — RESPIRATORY THERAPY NOTE
Pt placed on vent CPAP 6/PS 10 fio2 100% due to respiratory distress, pt rec'd w/ #6 shiley cuffless trach tube, inserted #6 shiley cuffed trach tube w/ MD assistance w/o incident, decreased fio2 to 50%, SPO2 noted at 100%

## 2022-02-24 NOTE — ASSESSMENT & PLAN NOTE
· Severely anxious when dyspneic on arrival to ED and ICU  · Takes lorazepam 0 5mg PO BID OP  · Anxiety much improved after transition to trach collar  · Will continue PRN lorazepam

## 2022-02-24 NOTE — ASSESSMENT & PLAN NOTE
· Admitted for STEMI SLB 10/22-11/23/21  · S/p cardiac cath "mid LCX culprit lesion at bifurcation of the OM2 both vessels were small in caliber, t/w 2 5 x 28 mm Xience PATRICA, OM2 was ballooned but not stented; mild nonobstructive disease in LAD and RCA"  · On daily Eliquis, ticagrelor, statin  · EKG NSR with R  BBB  No ST changes  · Troponin negative x2  No further trending  · Continuous cardiac monitoring  Life vest removed on admission

## 2022-02-24 NOTE — ASSESSMENT & PLAN NOTE
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 -Need to confirm dose with son  Is also on Losartan 150mg PO daily   · CTA showing b/l pleural effusions and b/l opacities possibly 2/2 pulmonary edema  · Reports increasing lower extremity edema  Unclear compliance with low sodium diet  Will discuss with son today  · S/p extra dose of lasix 40mg IV in ED, diuresed 650ml and 300ml urine  · ECHO ordered   · Admission weight 91 1kg  · Daily weights  · NPO for now  Dietary sodium restriction when taking PO  Patient does eat despite trach in place   Consider formal swallow eval if difficulty passing bedside swallow   · Close I&O monitoring  · Consult Cardiology in AM

## 2022-02-24 NOTE — PLAN OF CARE
Problem: PAIN - ADULT  Goal: Verbalizes/displays adequate comfort level or baseline comfort level  Description: Interventions:  - Encourage patient to monitor pain and request assistance  - Assess pain using appropriate pain scale  - Administer analgesics based on type and severity of pain and evaluate response  - Implement non-pharmacological measures as appropriate and evaluate response  - Consider cultural and social influences on pain and pain management  - Notify physician/advanced practitioner if interventions unsuccessful or patient reports new pain  Outcome: Progressing     Problem: INFECTION - ADULT  Goal: Absence or prevention of progression during hospitalization  Description: INTERVENTIONS:  - Assess and monitor for signs and symptoms of infection  - Monitor lab/diagnostic results  - Monitor all insertion sites, i e  indwelling lines, tubes, and drains  - Monitor endotracheal if appropriate and nasal secretions for changes in amount and color  - [place] appropriate cooling/warming therapies per order  - Administer medications as ordered  - Instruct and encourage patient and family to use good hand hygiene technique  - Identify and instruct in appropriate isolation precautions for identified infection/condition  Outcome: Progressing      Problem: SAFETY ADULT  Goal: Patient will remain free of falls  Description: INTERVENTIONS:  - Educate patient/family on patient safety including physical limitations  - Instruct patient to call for assistance with activity   - Consult OT/PT to assist with strengthening/mobility   - Keep Call bell within reach  - Keep bed low and locked with side rails adjusted as appropriate  - Keep care items and personal belongings within reach  - Initiate and maintain comfort rounds  - Make Fall Risk Sign visible to staff  - Offer Toileting every 2 Hours, in advance of need  - Initiate/Maintain bed alarm  - Apply yellow socks and bracelet for high fall risk patients  - Consider moving patient to room near nurses station  Outcome: Progressing  Goal: Maintain or return to baseline ADL function  Description: INTERVENTIONS:  -  Assess patient's ability to carry out ADLs; assess patient's baseline for ADL function and identify physical deficits which impact ability to perform ADLs (bathing, care of mouth/teeth, toileting, grooming, dressing, etc )  - Assess/evaluate cause of self-care deficits   - Assess range of motion  - Assess patient's mobility; develop plan if impaired  - Assess patient's need for assistive devices and provide as appropriate  - Encourage maximum independence but intervene and supervise when necessary  - Involve family in performance of ADLs  - Assess for home care needs following discharge   - Consider OT consult to assist with ADL evaluation and planning for discharge  - Provide patient education as appropriate  Outcome: Progressing  Goal: Maintains/Returns to pre admission functional level  Description: INTERVENTIONS:  - Perform BMAT or MOVE assessment daily    - Set and communicate daily mobility goal to care team and patient/family/caregiver  - Collaborate with rehabilitation services on mobility goals if consulted  - Reposition patient every 2 hours  - Dangle patient as tolerated  - Stand patient as tolerated  -- Out of bed to chair as tolerated   - Out of bed for toileting  - Record patient progress and toleration of activity level   Outcome: Progressing     Problem: Knowledge Deficit  Goal: Patient/family/caregiver demonstrates understanding of disease process, treatment plan, medications, and discharge instructions  Description: Complete learning assessment and assess knowledge base    Interventions:  - Provide teaching at level of understanding  - Provide teaching via preferred learning methods  Outcome: Progressing    Problem: CARDIOVASCULAR - ADULT  Goal: Maintains optimal cardiac output and hemodynamic stability  Description: INTERVENTIONS:  - Monitor I/O, vital signs and rhythm  - Monitor for S/S and trends of decreased cardiac output  - Administer and titrate ordered vasoactive medications to optimize hemodynamic stability  - Assess quality of pulses, skin color and temperature  - Assess for signs of decreased coronary artery perfusion  - Instruct patient to report change in severity of symptoms  Outcome: Progressing  Goal: Absence of cardiac dysrhythmias or at baseline rhythm  Description: INTERVENTIONS:  - Continuous cardiac monitoring, vital signs, obtain 12 lead EKG if ordered  - Administer antiarrhythmic and heart rate control medications as ordered  - Monitor electrolytes and administer replacement therapy as ordered  Outcome: Progressing     Problem: METABOLIC, FLUID AND ELECTROLYTES - ADULT  Goal: Electrolytes maintained within normal limits  Description: INTERVENTIONS:  - Monitor labs and assess patient for signs and symptoms of electrolyte imbalances  - Administer electrolyte replacement as ordered  - Monitor response to electrolyte replacements, including repeat lab results as appropriate  - Instruct patient on fluid and nutrition as appropriate  Outcome: Progressing  Goal: Fluid balance maintained  Description: INTERVENTIONS:  - Monitor labs   - Monitor I/O and WT  - Instruct patient on fluid and nutrition as appropriate  - Assess for signs & symptoms of volume excess or deficit  Outcome: Progressing  Goal: Glucose maintained within target range  Description: INTERVENTIONS:  - Monitor Blood Glucose as ordered  - Assess for signs and symptoms of hyperglycemia and hypoglycemia  - Administer ordered medications to maintain glucose within target range  - Assess nutritional intake and initiate nutrition service referral as needed  Outcome: Progressing      Problem: SKIN/TISSUE INTEGRITY - ADULT  Goal: Skin Integrity remains intact(Skin Breakdown Prevention)  Description: Assess:  -Perform Sumit assessment every shift  -Clean and moisturize skin as needed  -Inspect skin when repositioning, toileting, and assisting with ADLS  -Assess extremities for adequate circulation and sensation     Bed Management:  -Have minimal linens on bed & keep smooth, unwrinkled  -Change linens as needed when moist or perspiring  -Avoid sitting or lying in one position for more than 2 hours while in bed  -Keep HOB at 45degrees     Toileting:  -Offer bedside commode  -Assess for incontinence every 2 hours  -Use incontinent care products after each incontinent episode such as pads    Activity:  -Mobilize patient as tolerated   -Encourage activity and walks on unit  -Encourage or provide ROM exercises   -Turn and reposition patient every 2 Hours  -Use appropriate equipment to lift or move patient in bed  -Instruct/ Assist with weight shifting every 2 when out of bed in chair  -Consider limitation of chair time 4 hour intervals    Skin Care:  -Avoid use of baby powder, tape, friction and shearing, hot water or constrictive clothing  -Relieve pressure over bony prominences using pillows and wedges  -Do not massage red bony areas    Next Steps:  -Teach patient strategies to minimize risks such as turning and repositioning   Outcome: Progressing  Goal: Incision(s), wounds(s) or drain site(s) healing without S/S of infection  Description: INTERVENTIONS  - Assess and document dressing, incision, wound bed, drain sites and surrounding tissue  - Provide patient and family education    Outcome: Progressing  Goal: Pressure injury heals and does not worsen  Description: Interventions:  - Implement low air loss mattress or specialty surface (Criteria met)  - Apply silicone foam dressing  - Instruct/assist with weight shifting every 60 minutes when in chair   - Limit chair time to 4 hour intervals  - Use special pressure reducing interventions such as wafer when in chair   - Apply fecal or urinary incontinence containment device   - Turn and reposition patient & offload bony prominences every 2 hours   - Utilize friction reducing device or surface for transfers   -- Use incontinent care products after each incontinent episode such as incontinence pad  - Consider nutrition services referral as needed  Outcome: Progressing     Problem: MUSCULOSKELETAL - ADULT  Goal: Maintain or return mobility to safest level of function  Description: INTERVENTIONS:  - Assess patient's ability to carry out ADLs; assess patient's baseline for ADL function and identify physical deficits which impact ability to perform ADLs (bathing, care of mouth/teeth, toileting, grooming, dressing, etc )  - Assess/evaluate cause of self-care deficits   - Assess range of motion  - Assess patient's mobility  - Assess patient's need for assistive devices and provide as appropriate  - Encourage maximum independence but intervene and supervise when necessary  - Involve family in performance of ADLs  - Assess for home care needs following discharge   - Consider OT consult to assist with ADL evaluation and planning for discharge  - Provide patient education as appropriate  Outcome: Progressing  Goal: Maintain proper alignment of affected body part  Description: INTERVENTIONS:  - Support, maintain and protect limb and body alignment  - Provide patient/ family with appropriate education  Outcome: Progressing     Problem: RESPIRATORY - ADULT  Goal: Achieves optimal ventilation and oxygenation  Description: INTERVENTIONS:  - Assess for changes in respiratory status  - Assess for changes in mentation and behavior  - Position to facilitate oxygenation and minimize respiratory effort  - Oxygen administered by appropriate delivery if ordered  - Initiate smoking cessation education as indicated  - Encourage broncho-pulmonary hygiene including cough, deep breathe, Incentive Spirometry  - Assess the need for suctioning and aspirate as needed  - Assess and instruct to report SOB or any respiratory difficulty  - Respiratory Therapy support as indicated  Outcome: Progressing     Problem: MOBILITY - ADULT  Goal: Maintain or return to baseline ADL function  Description: INTERVENTIONS:  -  Assess patient's ability to carry out ADLs; assess patient's baseline for ADL function and identify physical deficits which impact ability to perform ADLs (bathing, care of mouth/teeth, toileting, grooming, dressing, etc )  - Assess/evaluate cause of self-care deficits   - Assess range of motion  - Assess patient's mobility; develop plan if impaired  - Assess patient's need for assistive devices and provide as appropriate  - Encourage maximum independence but intervene and supervise when necessary  - Involve family in performance of ADLs  - Assess for home care needs following discharge   - Consider OT consult to assist with ADL evaluation and planning for discharge  - Provide patient education as appropriate  Outcome: Progressing  Goal: Maintains/Returns to pre admission functional level  Description: INTERVENTIONS:  - Perform BMAT or MOVE assessment daily    - Set and communicate daily mobility goal to care team and patient/family/caregiver  - Collaborate with rehabilitation services on mobility goals if consulted  - Reposition patient every 2 hours    - Dangle patient as tolerated    - Out of bed to chair as toelrated   - Record patient progress and toleration of activity level   Outcome: Progressing     Problem: Potential for Falls  Goal: Patient will remain free of falls  Description: INTERVENTIONS:  - Educate patient/family on patient safety including physical limitations  - Instruct patient to call for assistance with activity   - Consult OT/PT to assist with strengthening/mobility   - Keep Call bell within reach  - Keep bed low and locked with side rails adjusted as appropriate  - Keep care items and personal belongings within reach  - Initiate and maintain comfort rounds  - Make Fall Risk Sign visible to staff  - Offer Toileting every 2 Hours, in advance of need  - Initiate/Maintain bed alarm  - Apply yellow socks and bracelet for high fall risk patients  - Consider moving patient to room near nurses station  Outcome: Progressing     Problem: Prexisting or High Potential for Compromised Skin Integrity  Goal: Skin integrity is maintained or improved  Description: INTERVENTIONS:  - Identify patients at risk for skin breakdown  - Assess and monitor skin integrity  - Assess and monitor nutrition and hydration status  - Monitor labs   - Assess for incontinence   - Turn and reposition patient  - Assist with mobility/ambulation  - Relieve pressure over bony prominences  - Avoid friction and shearing  - Provide appropriate hygiene as needed including keeping skin clean and dry  - Evaluate need for skin moisturizer/barrier cream  - Collaborate with interdisciplinary team   - Patient/family teaching  - Consider wound care consult   Outcome: Progressing

## 2022-02-24 NOTE — EMTALA/ACUTE CARE TRANSFER
[facility] EMERGENCY DEPARTMENT  [address] [address]  Dept: [phone]      EMTALA TRANSFER CONSENT    NAME Edvin Yu                                         1965                              MRN [id number]    I have been informed of my rights regarding examination, treatment, and transfer   by Dr Monica Ji DO    Benefits: Specialized equipment and/or services available at the receiving facility (Include comment)________________________    Risks: Potential for delay in receiving treatment,Potential deterioration of medical condition,Loss of IV,Increased discomfort during transfer      Consent for Transfer:  I acknowledge that my medical condition has been evaluated and explained to me by the emergency department physician or other qualified medical person and/or my attending physician, who has recommended that I be transferred to the service of  Accepting Physician: Dr Any Mcbride at [address], [address] : [facility], ICU, [address] [facility]  The above potential benefits of such transfer, the potential risks associated with such transfer, and the probable risks of not being transferred have been explained to me, and I fully understand them  The doctor has explained that, in my case, the benefits of transfer outweigh the risks  I agree to be transferred  I authorize the performance of emergency medical procedures and treatments upon me in both transit and upon arrival at the receiving facility  Additionally, I authorize the release of any and all medical records to the receiving facility and request they be transported with me, if possible  I understand that the safest mode of transportation during a medical emergency is an ambulance and that the Hospital advocates the use of this mode of transport   Risks of traveling to the receiving facility by car, including absence of medical control, life sustaining equipment, such as oxygen, and medical personnel has been explained to me and I fully understand them  (LORNA CORRECT BOX BELOW)  [  ]  I consent to the stated transfer and to be transported by ambulance/helicopter  [  ]  I consent to the stated transfer, but refuse transportation by ambulance and accept full responsibility for my transportation by car  I understand the risks of non-ambulance transfers and I exonerate the Hospital and its staff from any deterioration in my condition that results from this refusal     X___________________________________________    DATE  22  TIME________  Signature of patient or legally responsible individual signing on patient behalf           RELATIONSHIP TO PATIENT_________________________          Provider Certification    NAME Bettylou Pallas Vanness                                         1965                              MRN [id number]    A medical screening exam was performed on the above named patient  Based on the examination:    Condition Necessitating Transfer The primary encounter diagnosis was Acute respiratory failure ([address] )  A diagnosis of COPD with acute exacerbation ([address] ) was also pertinent to this visit      Patient Condition: The patient has been stabilized such that within reasonable medical probability, no material deterioration of the patient condition or the condition of the unborn child(yamini) is likely to result from the transfer    Reason for Transfer: Level of Care needed not available at this facility    Transfer Requirements: [address] [facility], ICU, [address] [facility]   · Space available and qualified personnel available for treatment as acknowledged by    · Agreed to accept transfer and to provide appropriate medical treatment as acknowledged by       Dr Minerva Weiner  · Appropriate medical records of the examination and treatment of the patient are provided at the time of transfer   [address] _______  · Transfer will be performed by qualified personnel from    and appropriate transfer equipment as required, including the use of necessary and appropriate life support measures  Provider Certification: I have examined the patient and explained the following risks and benefits of being transferred/refusing transfer to the patient/family:  General risk, such as traffic hazards, adverse weather conditions, rough terrain or turbulence, possible failure of equipment (including vehicle or aircraft), or consequences of actions of persons outside the control of the transport personnel,Unanticipated needs of medical equipment and personnel during transport,Risk of worsening condition,The possibility of a transport vehicle being unavailable      Based on these reasonable risks and benefits to the patient and/or the unborn child(yamini), and based upon the information available at the time of the patients examination, I certify that the medical benefits reasonably to be expected from the provision of appropriate medical treatments at another medical facility outweigh the increasing risks, if any, to the individuals medical condition, and in the case of labor to the unborn child, from effecting the transfer      X____________________________________________ DATE 02/23/22        TIME_______      ORIGINAL - SEND TO MEDICAL RECORDS   COPY - SEND WITH PATIENT DURING TRANSFER

## 2022-02-24 NOTE — SPEECH THERAPY NOTE
Speech-Language Pathology   Speaking Valve Evaluation    Patient Name: Godfrey Gupta 55 y o  Today's Date: 2/24/2022      Problem List  Principal Problem:    Acute hypoxemic respiratory failure (HCC)  Active Problems:    Anxiety    Hyperlipidemia associated with type 2 diabetes mellitus ([place] [address] )    Hypertension    Uncontrolled type 2 diabetes mellitus with complication, with long-term current use of insulin ([facility])    History of Ventricular tachycardia ([facility])    A-fib ([place] [address] )    History of Cardiac arrest ([place] [address] )    CAD (coronary artery disease)    Heart failure ([place] [address] )    Past Medical History  Past Medical History:   Diagnosis Date    Anxiety     Aortic aneurysm ([place] [address] )     Arthritis     Depression     Diabetes mellitus ([facility])     Fibromyalgia     GERD (gastroesophageal reflux disease)     GERD (gastroesophageal reflux disease)     H/O cardiovascular stress test 09/2018    no ischemia  EF 70%   H/O echocardiogram 01/2019    EF 60%  Mild LVH  trivial effusion   Hyperlipidemia     Hypertension     Migraines     Psychiatric disorder     anxiety    Uncontrolled hypertension 2/25/2015    Last Assessment & Plan:  BP today above goal <140/90, apparently asymptomatic  Prior BP elevations were attributed to not taking medication  Consider increased medication if persistent on f/u  Past Surgical History  Past Surgical History:   Procedure Laterality Date    BACK SURGERY      Lumbar epidural steroid injection    CARDIAC CATHETERIZATION N/A 10/22/2021    Procedure: Cardiac pci;  Surgeon: Sherrie Junior MD;  [facility]; Service: Cardiology    CARDIAC CATHETERIZATION  10/22/2021    Procedure: Cardiac catheterization;  Surgeon: Sherrie Junior MD;  [facility]; Service: Cardiology    CARDIAC CATHETERIZATION Left 10/27/2021    Procedure: Cardiac Left Heart Cath;  Surgeon: Doreen Villarreal MD;  [facility];   Service: Cardiology    CARDIAC CATHETERIZATION N/A 10/27/2021    Procedure: Cardiac pci;  Surgeon: Irina George MD;  [facility]; Service: Cardiology    CARDIAC CATHETERIZATION N/A 10/28/2021    Procedure: Cardiac pci;  Surgeon: Eder Perez DO;  [facility]; Service: Cardiology    CARPAL TUNNEL RELEASE Left      SECTION      CHOLECYSTECTOMY      COLONOSCOPY      incomplete    COLONOSCOPY      EYE SURGERY      HYSTERECTOMY      Total    OVARIAN CYST REMOVAL      PEG W/TRACHEOSTOMY PLACEMENT N/A 2021    Procedure: TRACHEOSTOMY WITH INSERTION PEG TUBE;  Surgeon: Jana Fung DO;  [facility];  Service: General    TUBAL LIGATION      UPPER GASTROINTESTINAL ENDOSCOPY           Current Medical Status  Patient is a 56 y/o female who initially presented to [facility] for respiratory distress  PMHx of HFrEF, CAD s/p PATRICA to LCx, Afib on Eliquis, HTN, HLD, anxiety, type 2 DM, prior cardiac arrest and VT on lifevest  She has had multiple hospitalizations since the end of last year after experiencing a STEMI in October  Had a prolonged hospitalization - complicated by cardiogenic shock, VT arrest x2, and sepsis  Required Trach & PEG at that time and was d/c to [facility], [facility]  She then experienced cardiac arrest 22 while at [facility], had ROSC pre hospital after AED shock x1  Was admitted to [facility] post arrest  Was recommended by cardiology for ICD, however had an infectious pulmonary bullae on initial CT scan  Was treated with linezolid then doxycycline 4 week course and recommended for life vest followed by elective ICD after completion of antibiotics and repeat imaging  Was admitted to IP rehab , and finally d/c home 2/10 on life vest with trach in place  Was seen in [facility] ED for acute SOB , received lasix, neb tx, and lorazepam and SOB improved  Admission was recommended at that time due to concerns for CHF but patient declined   She presented again for worsening SOB and anxiety last evening and required multiple doses of lorazepam and versed  She was connected to the vent on PS 10/6 and 50% and was given 40mg lasix IVP  Diuresed 650ml urine  CT imaging showed b/l GGO concerning for pulmonary edema and bilateral pleural effusions  Labs were significant for lactic acid of 2 4, which has since cleared  NT-pro BNP was 463, troponin was negative  She was transferred to [facility] ICU for further management last night and her trach was changed from #6 Shiley cuffless to #6 Shiley cuffed trach and she was placed on ventilator support  SLP consult was requested to assess swallow function and determine potential for po as well as PMSV  The patient reportedly has been living at home  She does not have a PMSV and has been on a regular diet with thin liquids  She participated in two VBS' in the last few months at [facility]  The most recent one on 1/14/2022 which was WNL per the radiologist's note  Consult for speaking valve evaluation received and completed bedside  PMH  See EMR for details    Imaging Studies:  2/23 CTA Chest: 1  No evidence of pulmonary embolism  2   Mild diffuse ground glass opacities in the lungs may be due to pulmonary edema  Small bilateral pleural effusions  Cardiomegaly  Correlate for heart failure  3   Patchy opacities at the bilateral lower lobe bases and within the posterior left upper lobe which may be due to atelectasis and/or pneumonia in the appropriate clinical setting     2/23 CXR: Stable mild pulmonary edema and small left pleural effusion  1/14/2022 VBS: Swallowing function study was performed in   conjunction with speech pathology  Video was utilized  The patient swallowed   various mixtures of barium  There is no evidence of penetration or aspiration,   coordination of swallowing was within normal limits         Baseline Status:   Behavior/Cognition: alert  Speech/Language Status: able to participate in conversation and able to follow commands  Patient Positioning: upright in bed  Pain:  Appears comfortable on trach collar and no report or indication of pain  Baseline Oral-Mechanism and Motor Evaluation     Oral Mechanism Exam   Facial: symmetrical  Labial: WFL  Lingual: WFL  Velum: symmetrical  Mandible: adequate ROM  Dentition: adequate  Tracheostomy: Mariana #6 cuffed  Vital Signs at Baseline: SpO2 95, RR22, HR 65    Cuff deflated- Assessment with brief digital occlusion: no voicing achieved with positive back pressure    Summary   Patient does not appear to be a candidate for PMSV at this time  She reports she has trialed one in the past but was not appropriate due to her stenosis       MINDA Griggs , [address] [facility]  Speech Language Pathologist   Available via [address] [place] [address] #[id number]  [place] #[id number]

## 2022-02-24 NOTE — SPEECH THERAPY NOTE
Speech-Language Pathology Bedside Swallow Evaluation        Patient Name: Linda Fam    INQGD'C Date: 2/24/2022     Problem List  Patient Active Problem List   Diagnosis    Anxiety    Cardiac murmur    Carpal tunnel syndrome of left wrist    Change in bowel habit    Chronic pain disorder    Cough    [facility]    Retinal hemorrhage due to secondary diabetes [facility]    Diabetic peripheral neuropathy (HCC)    Dizziness    Fibromyalgia    Gastroesophageal reflux disease with esophagitis    Hemangioma of bone    Hyperlipidemia associated with type 2 diabetes mellitus ([facility]    Hypertension    Hypoestrogenism    Low back pain    Lumbar radiculopathy    Medically complex patient    Neuropathy    Non compliance with medical treatment    Noncompliance with diet and medication regimen    Overweight    Primary insomnia    Right-sided thoracic back pain    Sciatica of left side    Screening for colon cancer    Shingles    Thoracic radiculitis    Tobacco abuse    Uncontrolled type 2 diabetes mellitus with complication, with long-term current use of insulin ([facility])    Vertebral body hemangioma    Vertigo    Vaginal discharge    Yeast vaginitis    Recurrent vaginitis    Abnormal urinalysis    Thoracic aortic aneurysm without rupture ([facility])    Epigastric pain    Urinary tract infection with hematuria    Bacterial vaginosis    Abscess    Palpitations    Nasal vestibulitis    Orthopnea    CHANTALE (obstructive sleep apnea)    Diarrhea    Trigger finger, right middle finger    Trigger finger, right ring finger    Hypoglycemia    Polypharmacy    Cellulitis of left lower extremity    History of Ventricular tachycardia ([facility])    A-fib (HCC)    Acute hypoxemic respiratory failure (HCC)    History of Card[facility] arrest [facility]    Cerebral vascular accident ([facility]    Cerebral aneurysm    Urinary retention    CAD (coronary artery disease)    Heart failure ([facility] ) Past Medical History  Past Medical History:   Diagnosis Date    Anxiety     Aortic aneurysm ([address]     Arthritis     Depression     Diabetes mellitus (HCC)     Fibromyalgia     GERD (gastroesophageal reflux disease)     GERD (gastroesophageal reflux disease)     H/O cardiovascular stress test 2018    no ischemia  EF 70%   H/O echocardiogram 2019    EF 60%  Mild LVH  trivial effusion   Hyperlipidemia     Hypertension     Migraines     Psychiatric disorder     anxiety    Uncontrolled hypertension 2015    Last Assessment & Plan:  BP today above goal <140/90, apparently asymptomatic  Prior BP elevations were attributed to not taking medication  Consider increased medication if persistent on f/u  Past Surgical History  Past Surgical History:   Procedure Laterality Date    BACK SURGERY      Lumbar epidural steroid injection    CARDIAC CATHETERIZATION N/A 10/22/2021    Procedure: Cardiac pci;  Surgeon: Brandt Daniel MD;  [facility]; Service: Cardiology    CARDIAC CATHETERIZATION  10/22/2021    Procedure: Cardiac catheterization;  Surgeon: Brandt Daniel MD;  [facility]; Service: Cardiology    CARDIAC CATHETERIZATION Left 10/27/2021    Procedure: Cardiac Left Heart Cath;  Surgeon: Emma Dang MD;  [facility]; Service: Cardiology    CARDIAC CATHETERIZATION N/A 10/27/2021    Procedure: Cardiac pci;  Surgeon: Emma Dang MD;  [facility]; Service: Cardiology    CARDIAC CATHETERIZATION N/A 10/28/2021    Procedure: Cardiac pci;  Surgeon: Elizabeth Dash DO;  [facility];   Service: Cardiology    CARPAL TUNNEL RELEASE Left      SECTION      CHOLECYSTECTOMY      COLONOSCOPY      incomplete    COLONOSCOPY      EYE SURGERY      HYSTERECTOMY      Total    OVARIAN CYST REMOVAL      PEG W/TRACHEOSTOMY PLACEMENT N/A 2021    Procedure: TRACHEOSTOMY WITH INSERTION PEG TUBE;  Surgeon: Annmarie Fernandez To, DO;  [facility];  Service: General    TUBAL LIGATION      UPPER GASTROINTESTINAL ENDOSCOPY           Current Medical Status  See previous note from today  Past medical history:   Please see H&P for details    Special Studies:  2/23 CTA Chest: 1   No evidence of pulmonary embolism  2   Mild diffuse ground glass opacities in the lungs may be due to pulmonary edema   Small bilateral pleural effusions   Cardiomegaly   Correlate for heart failure  3   Patchy opacities at the bilateral lower lobe bases and within the posterior left upper lobe which may be due to atelectasis and/or pneumonia in the appropriate clinical setting      2/23 CXR: Stable mild pulmonary edema and small left pleural effusion      1/14/2022 VBS: Swallowing function study was performed in   conjunction with speech pathology  Video was utilized  The patient swallowed   various mixtures of barium  There is no evidence of penetration or aspiration,   coordination of swallowing was within normal limits       Social/Education/Vocational Hx:  Pt lives with family (nephew, son, and sons girlfriend)    Swallow Information   Current Risks for Dysphagia & Aspiration: known history of dysphagia, known history of aspiration (on prior VBS) and change in respiratory status     Current Symptoms/Concerns: change in respiratory status, presence of trach    Current Diet: NPO      Baseline Diet: regular diet and thin liquids      Baseline Assessment   Behavior/Cognition: alert    Speech/Language Status: able to participate in conversation and able to follow commands    Patient Positioning: upright in bed    Swallow Mechanism Exam   Facial: symmetrical  Labial: WFL  Lingual: WFL  Velum: symmetrical  Mandible: adequate ROM  Dentition: adequate  Vocal quality:aphonic   Volitional Cough: unable to initiate volitional cough   Tracheostomy: Marinaa #6 cuffed ( deflated during my visit)   Patient on trach collar 10L O2    RN provided tracheal suctioning prior to and after po  Consistencies Assessed and Performance   Consistencies Administered: thin liquids, puree, soft solids and hard solids  Specific materials administered included: applesauce, turkey sandwich, pretzels, thin liquids    Oral Stage:  Mastication was adequate with the materials administered today  Bolus formation and transfer were functional with no significant oral residue noted  No overt s/s reduced oral control  Pharyngeal Stage:   Swallowing initiation appeared prompt  Laryngeal rise was palpated and judged to be within functional limits  Audible swallow noted with regular solids  Cough noted on initial sip of thin liquids  No additional coughing, throat clearing, change in vocal quality or respiratory status noted today  Esophageal Concerns: none reported    Summary   Pts oropharyngeal swallow function appears generally WFL at this time with the materials administered today  Cannot r/o silent aspiration       Recommendations: regular diet and thin liquids     Recommended Form of Meds: as tolerated     Aspiration precautions and compensatory swallowing strategies: upright posture, slow rate of feeding and small bites/sips    Results Reviewed with: patient, RN and CRNP     Dysphagia Goals: pt will tolerate regular textures with thin liquids without s/s of aspiration x3 and pt will participate in VBS    Plan  Will f/u and determine if additional objective imaging is warranted    MINDA Owens S , [address] [facility]  Speech Language Pathologist   Available via [address] [place] [address] #[id number]  [phone] #[id number]

## 2022-02-24 NOTE — ASSESSMENT & PLAN NOTE
Lab Results   Component Value Date    HGBA1C 6 6 (H) 01/01/2022       No results for input(s): POCGLU in the last 72 hours      Blood Sugar Average: Last 72 hrs:  · Discharged from inpatient rehab on Basaglar 8 units daily and Apidra 2 units tid   · Hold long acting insulin for now given NPO status  · q6h BG monitoring with SSI   · Goal -180

## 2022-02-24 NOTE — ASSESSMENT & PLAN NOTE
· Hx of respiratory arrest last month, admitted to [facility]  S/p T&P  On 10L FiO2 via trach collar @ baseline  · Presentated to ED 2/22 for SOB, thought 2/2 anxiety +/- CHF exacerbation  Was given lasix, nebs, lorazepam and symptoms improved  Was recommended for admission, but requested to leave  · Presented again to [facility] ED 2/23 via her son with c/o acute onset SOB  · SpO2 89-92% on arrival to ED  Placed on PS 10/6 and 50%  SpO2 and WOB improved to 100%  · NT-proBNP 463, history of heart failure EF 40%  Reports recent increase in peripheral edema  Reports taking lasix 40mg daily  Unable to verify meds with son  Left message on cell phone  · D-dimer 2 55  Is on Eliquis OP for history of Afib  Reports compliance  · CTA chest 2/23: "No PE  Mild diffuse ground glass opacities in the lungs may be due to pulmonary edema  Small bilateral pleural effusions  Cardiomegaly  Correlate for heart failure  Patchy opacities at the bilateral lower lobe bases and within the posterior left upper lobe which may be due to atelectasis and/or pneumonia"  · S/p furosemide 40mg x1 in ED  UOP 650ml in ED and another 300ml in ICU  · Respiratory failure likely 2/2 heart failure, see plan below  · Afebrile  No leukocytosis  Doubt pulmonary infection  Recently treated w/10 days of doxycycline for R  Lung bulla vs  Pneumatocele  Monitor off of antibiotics  Obtain MRSA swab and repeat BC's    · Check AM procal, trend temperature curve and CBC  · Wean FiO2 to goal SpO2 >90%  · Was anxious on vent upon arrival to ICU   Placed on 50% Trach collar, had immediate improvement in anxiety and WOB  · Increase lasix dosing to 40mg IV BID in AM

## 2022-02-24 NOTE — ASSESSMENT & PLAN NOTE
· Home medications include cozaar, furosemide  Also on metoprolol for afib   Will continue on admission  · BP currently controlled  · Goal SBP <160

## 2022-02-24 NOTE — ASSESSMENT & PLAN NOTE
Lab Results   Component Value Date    HGBA1C 6 6 (H) 01/01/2022       · Continue atorvastatin  · DM treatment per above plan

## 2022-02-24 NOTE — CONSULTS
Consultation - Cardiology   Pete Cervantes 55 y o  female MRN: [id number]  Unit/Bed#: ICU 12 Encounter: [id number]    Assessment/Plan     Assessment:  1  Acute hypoxic respiratory failure  2  Acute on chronic combined systolic and diastolic heart failure  3  Coronary artery disease with recent STEMI  4  History of VT arrest  5  Paroxysmal atrial fibrillation  6  Diabetes  7  Hypertension  8  Anxiety   9  Subglottic stenosis with history of trach       Plan:  Patient has been admitted to the intensive care service  1  Agree with continuing Lasix 40 mg IV b i d  With close monitoring of I&O and labs  2  Repeat echocardiogram has been ordered by primary team, preliminary reading appears that EF now is 50-55%  Previous echocardiogram from [facility] in January demonstrated EF of 35-40% with inferior apical hypokinesis  3  Continue Lopressor 25 mg q 12 hours with goal to have heart rate as low as patient will tolerate, in the 60s to 70s range at rest     4  Patient's blood pressure was noted to be elevated, will increase Cozaar to 50 mg once a day and continue monitor electrolytes and renal function  5  May leave life vest off while patient is in the hospital and on telemetry  6  Diabetic management per primary team    7  Continue Eliquis for history of paroxysmal atrial fibrillation    8  Continue Brilinta for recent stent and STEMI  9  Continue amiodarone 200 mg once a day  10  Would recommend early follow-up for placement of ICD if cleared from Infectious Disease from previous infections  Can most likely be done as an outpatient, unless patient experiences VT during this hospitalization            History of Present Illness   Physician Requesting Consult: Brielle Pugh DO  Reason for Consult / Principal Problem:  Acute shortness of breath, volume overload seen on CT, significant coronary history with recent VT arrests      HPI: Pete Cervantes is a 55y o  year old female who initially presented to the emergency room at [address] with complaints of sudden onset of shortness of breath  She was brought to the emergency room by her son  CT scan PE protocol did not demonstrate any PA but there was concern for pulmonary edema  BNP was 463, high sensitivity troponin was 29 and 27 with a negative delta  Patient having a trach had been placed on the ventilator in the emergency room and given Lasix 40 mg IV  She was subsequently transferred to the [facility] for ICU management  Since admission to hospital patient has been weaned to trach collar after diuresing approximately 1 L of fluids  Of note patient's blood pressure on presentation was elevated at 155/83  Patient has a significant history for VT arrest on 10/26/2021, STEMI with polymorphic VT October 20, 2021  She was at [address] and underwent cardiac catheterization which showed a new 100% distal LAD lesion from a thrombotic embolus, it was felt that the vessel was too small and distal for embolectomy  She also had catheterization which demonstrated widely patent obtuse marginal 1 stent and mid circumflex lesion of 90% which they were unable to pass wire through the OM 1 stent  She had a long admission, complicated by cardiogenic shock, VT arrest, atrial fibrillation, MRSA pneumonia, acute kidney injury after catheterization  Patient also on November 5, 2021 was noted to have pupil asymmetry and CT scan of the head demonstrated multiple acute and early subacute infarcts present in the cerebral hemispheres bilaterally, impression from Neurology was that these infarcts appear to be watershed likely due to hypotensive episodes and cardiac arrests  She was discharged from the [facility] on November 23, 2021 to [facility]  On January 1st, patient was found blue and coughing at the rehabilitation center and became unresponsive without a pulse  AED she advise shock which was given and ROSC was achieved  Patient was not maintaining her airway, she was unable to be intubated in the field and had placement of a Candy Passey LT for airway management  She was taken to [facility]  It was felt at that time arrest was due to respiratory cause and not ischemic  During that hospitalization patient had a flex laryngoscopy done which showed a 60% subglottic stenosis  On 01/07/2022 she underwent revision of tracheostomy  It had been advised by Cardiology at [facility] that patient undergo placement of an ICD but due to evidence of an infected bulla, ICD was canceled and patient was discharged with life vest     Inpatient consult to Cardiology  Consult performed by: NEELAM Mir  Consult ordered by: NEELAM Vega          Review of Systems   Unable to perform ROS: Intubated       Historical Information   Past Medical History:   Diagnosis Date    Anxiety     [facility]     Arthritis     Depression     [facility]     Fibromyalgia     GERD (gastroesophageal reflux disease)     GERD (gastroesophageal reflux disease)     H/O cardiovascular stress test 09/2018    no ischemia  EF 70%   H/O echocardiogram 01/2019    EF 60%  Mild LVH  trivial effusion   Hyperlipidemia     Hypertension     Migraines     Psychiatric disorder     anxiety    Uncontrolled hypertension 2/25/2015    Last Assessment & Plan:  BP today above goal <140/90, apparently asymptomatic  Prior BP elevations were attributed to not taking medication  Consider increased medication if persistent on f/u  Past Surgical History:   Procedure Laterality Date    BACK SURGERY      Lumbar epidural steroid injection    CARDIAC CATHETERIZATION N/A 10/22/2021    Procedure: Cardiac pci;  Surgeon: Nataliya Montemayor MD;  [facility];   Service: Cardiology    CARDIAC CATHETERIZATION  10/22/2021    Procedure: Cardiac catheterization;  Surgeon: Pratibha Norris MD;  [facility]; Service: Cardiology    CARDIAC CATHETERIZATION Left 10/27/2021    Procedure: Cardiac Left Heart Cath;  Surgeon: Christiane Ybarra MD;  [facility]; Service: Cardiology    CARDIAC CATHETERIZATION N/A 10/27/2021    Procedure: Cardiac pci;  Surgeon: Christiane Ybarra MD;  [facility]; Service: Cardiology    CARDIAC CATHETERIZATION N/A 10/28/2021    Procedure: Cardiac pci;  Surgeon: Alex Wiggins DO;  [facility]; Service: Cardiology    CARPAL TUNNEL RELEASE Left      SECTION      CHOLECYSTECTOMY      COLONOSCOPY      incomplete    COLONOSCOPY      EYE SURGERY      HYSTERECTOMY      Total    OVARIAN CYST REMOVAL      PEG W/TRACHEOSTOMY PLACEMENT N/A 2021    Procedure: TRACHEOSTOMY WITH INSERTION PEG TUBE;  Surgeon: Sofia Fung DO;  Location: [facility] MAIN OR;  Service: General    TUBAL LIGATION      UPPER GASTROINTESTINAL ENDOSCOPY       Social History     Substance and Sexual Activity   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        Social History     Substance and Sexual Activity   Drug Use Yes    Types: Marijuana    Comment: medical; seldom use     E-Cigarette/Vaping    E-Cigarette Use Never User      E-Cigarette/Vaping Substances    Nicotine No     THC No     CBD No     Flavoring No     Other No     Unknown No      Social History     Tobacco Use   Smoking Status Former Smoker    Packs/day: 1 00    Years: 30 00    Pack years: 30 00    Types: Cigarettes   Smokeless Tobacco Never Used     Family History:   Family History   Problem Relation Age of Onset    Hypertension Mother     Arthritis Mother     Diabetes Father     Other Sister         renal cell carcinoma    Diabetes Other     Factor V Leiden deficiency Other        Meds/Allergies   all current active meds have been reviewed, current meds:   Current Facility-Administered Medications   Medication Dose Route Frequency    acetaminophen (TYLENOL) oral suspension 975 mg  975 mg Per G Tube Q6H PRN    albuterol inhalation solution 2 5 mg  2 5 mg Nebulization Q4H PRN    amiodarone tablet 200 mg  200 mg Oral Daily With Breakfast    apixaban (ELIQUIS) tablet 5 mg  5 mg Oral BID    atorvastatin (LIPITOR) tablet 40 mg  40 mg Oral Daily    budesonide (PULMICORT) inhalation solution 0 5 mg  0 5 mg Nebulization Q12H    chlorhexidine (PERIDEX) 0 12 % oral rinse 15 mL  15 mL Mouth/Throat Q12H CEFERINO    famotidine (PEPCID) oral suspension 20 mg  20 mg Oral BID    furosemide (LASIX) injection 40 mg  40 mg Intravenous BID (diuretic)    insulin lispro (HumaLOG) 100 units/mL subcutaneous injection 2-12 Units  2-12 Units Subcutaneous Q6H [facility]    ipratropium (ATROVENT) 0 02 % inhalation solution 0 5 mg  0 5 mg Nebulization TID    levalbuterol (XOPENEX) inhalation solution 1 25 mg  1 25 mg Nebulization TID    LORazepam (ATIVAN) tablet 0 5 mg  0 5 mg Oral BID PRN    losartan (COZAAR) tablet 25 mg  25 mg Oral Daily    melatonin tablet 6 mg  6 mg Oral HS    metoprolol tartrate (LOPRESSOR) tablet 25 mg  25 mg Oral Q12H [facility]    ondansetron (ZOFRAN) injection 4 mg  4 mg Intravenous Q6H PRN    polyethylene glycol (MIRALAX) packet 17 g  17 g Oral Daily    pregabalin (LYRICA) capsule 75 mg  75 mg Oral Daily    QUEtiapine (SEROquel) tablet 25 mg  25 mg Oral HS    senna-docusate sodium (SENOKOT S) 8 6-50 mg per tablet 1 tablet  1 tablet Oral HS    ticagrelor (BRILINTA) tablet 90 mg  90 mg Oral Q12H [facility]    and PTA meds:   Prior to Admission Medications   Prescriptions Last Dose Informant Patient Reported? Taking?    Blood Pressure Monitoring (Sphygmomanometer) MISC  Self No No   Sig: Use 2 (two) times a day   Patient not taking: Reported on 2021   Insulin Pen Needle (UNIFINE PENTIPS PLUS) 31G X 6 MM MISC  Self Yes No   Si Device by Does not apply route 4 (four) times a day LORazepam (Ativan) 1 mg tablet   No No   Sig: Take 0 5 tablets (0 5 mg total) by mouth 2 (two) times a day as needed for anxiety for up to 10 days   Lancets MISC  Self Yes No   Si Device by Does not apply route 4 (four) times a day   Patient not taking: Reported on 2021   QUEtiapine (SEROquel) 25 mg tablet   No No   Sig: Take 1 tablet (25 mg total) by mouth daily at bedtime   acetaminophen (TYLENOL) 160 mg/5 mL suspension   No No   Si 4 mL (975 mg total) by Per G Tube route every 6 (six) hours as needed for mild pain or fever   albuterol (2 5 mg/3 mL) 0 083 % nebulizer solution   No No   Sig: Take 3 mL (2 5 mg total) by nebulization every 4 (four) hours as needed for wheezing   amiodarone 200 mg tablet   No No   Si tablet (200 mg total) by Per G Tube route 2 (two) times a day with meals   apixaban (ELIQUIS) 5 mg   No No   Sig: Take 1 tablet (5 mg total) by mouth 2 (two) times a day   apixaban (Eliquis) 5 mg   No No   Sig: Take 1 tablet (5 mg total) by mouth 2 (two) times a day for 7 days   atorvastatin (LIPITOR) 40 mg tablet   No No   Sig: TAKE 1 TABLET BY MOUTH EVERY DAY   chlorhexidine (PERIDEX) 0 12 % solution   No No   Sig: Apply 15 mL to the mouth or throat every 12 (twelve) hours   famotidine (PEPCID) 20 mg/2 5 mL oral suspension   No No   Sig: Take 2 5 mL (20 mg total) by mouth 2 (two) times a day   furosemide (LASIX) 10 mg/mL oral solution   No No   Si mL (40 mg total) by Per G Tube route 2 (two) times a day   insulin regular (HumuLIN R,NovoLIN R) 100 units/mL injection   No No   Sig: Inject 0 02-0 1 mL (2-10 Units total) under the skin every 6 (six) hours   insulin regular (HumuLIN R,NovoLIN R) 100 units/mL injection   No No   Sig: Inject 0 3 mL (30 Units total) under the skin every 6 (six) hours   ipratropium (ATROVENT) 0 02 % nebulizer solution   No No   Sig: Take 2 5 mL (0 5 mg total) by nebulization 3 (three) times a day   levalbuterol (XOPENEX) 1 25 mg/0 5 mL nebulizer solution No No   Sig: Take 0 5 mL (1 25 mg total) by nebulization 3 (three) times a day   losartan (COZAAR) 25 mg tablet   No No   Sig: Take 1 tablet (25 mg total) by mouth daily   melatonin 3 mg   No No   Sig: Take 2 tablets (6 mg total) by mouth daily at bedtime   metoprolol tartrate (LOPRESSOR) 25 mg tablet   No No   Sig: Take 1 tablet (25 mg total) by mouth every 12 (twelve) hours   ondansetron (ZOFRAN) 4 mg/2 mL injection   No No   Sig: Infuse 2 mL (4 mg total) into a venous catheter every 6 (six) hours as needed for nausea or vomiting   polyethylene glycol (MIRALAX) 17 g packet   No No   Sig: Take 17 g by mouth daily   potassium chloride 10% oral solution   No No   Sig: Take 15 mL (20 mEq total) by mouth daily   pregabalin (LYRICA) 75 mg capsule  Self No No   Sig: TAKE ONE CAPSULE EVERY DAY   senna-docusate sodium (SENOKOT S) 8 6-50 mg per tablet   No No   Sig: Take 1 tablet by mouth daily at bedtime   ticagrelor (BRILINTA) 90 MG   No No   Sig: Take 1 tablet (90 mg total) by mouth every 12 (twelve) hours      Facility-Administered Medications: None     Allergies   Allergen Reactions    Metformin Diarrhea    Clonidine Rash     Patch        Objective   Vitals: Blood pressure 125/69, pulse 68, temperature (!) 96 8 °F (36 °C), temperature source Temporal, resp  rate (!) 30, height 5' 10" (1 778 m), weight 91 4 kg (201 lb 8 oz), SpO2 98 %    Orthostatic Blood Pressures      Most Recent Value   Blood Pressure 125/69 filed at 02/24/2022 0800            Intake/Output Summary (Last 24 hours) at 2/24/2022 1023  Last data filed at 2/24/2022 0902  Gross per 24 hour   Intake 50 ml   Output 550 ml   Net -500 ml       Invasive Devices  Report    Peripheral Intravenous Line            Peripheral IV 02/23/22 Right Antecubital <1 day    Peripheral IV 02/23/22 Right Wrist <1 day          Drain            Gastrostomy/Enterostomy Percutaneous endoscopic gastrostomy (PEG) 20 Fr   days    Urethral Catheter Temperature probe 101 days Airway            Surgical Airway Shijustina Cuffed 103 days                Physical Exam  Vitals and nursing note reviewed  Constitutional:       Appearance: Normal appearance  She is obese  She is ill-appearing  HENT:      Head: Normocephalic  Right Ear: External ear normal       Left Ear: External ear normal    Eyes:      General: No scleral icterus  Right eye: No discharge  Left eye: No discharge  Cardiovascular:      Rate and Rhythm: Normal rate and regular rhythm  Pulses: Normal pulses  Heart sounds: Heart sounds are distant  Abdominal:      General: Bowel sounds are normal  There is no distension  Palpations: Abdomen is soft  Musculoskeletal:      Right lower le+ Pitting Edema present  Left lower le+ Pitting Edema present  Skin:     General: Skin is warm and dry  Capillary Refill: Capillary refill takes less than 2 seconds  Neurological:      General: No focal deficit present  Mental Status: She is alert  Mental status is at baseline  Psychiatric:         Mood and Affect: Mood normal          Lab Results:   I have personally reviewed pertinent lab results      CBC with diff:   Results from last 7 days   Lab Units 22  0518   WBC Thousand/uL 8 60   RBC Million/uL 2 99*   HEMOGLOBIN g/dL 7 7*   HEMATOCRIT % 25 4*   MCV fL 85   MCH pg 25 8*   MCHC g/dL 30 3*   RDW % 18 7*   MPV fL 11 2   PLATELETS Thousands/uL 288     CMP:   Results from last 7 days   Lab Units 22  0518   SODIUM mmol/L 144   CHLORIDE mmol/L 111*   CO2 mmol/L 25   BUN mg/dL 12   CREATININE mg/dL 0 81   CALCIUM mg/dL 7 9*   AST U/L 12   ALT U/L 18   ALK PHOS U/L 83   EGFR ml/min/1 73sq m 82     HS Troponin:   0   Lab Value Date/Time    HSTNI 31 (H) 2022 1816    HSTNI0 29 2022 2038    HSTNI2 27 2022 2220     BNP:   Results from last 7 days   Lab Units 22  0518   POTASSIUM mmol/L 3 1*   CHLORIDE mmol/L 111*   CO2 mmol/L 25   BUN mg/dL 12 CREATININE mg/dL 0 81   CALCIUM mg/dL 7 9*   EGFR ml/min/1 73sq m 82     Coags:   Results from last 7 days   Lab Units 02/23/22  2038   PTT seconds 25   INR  1 32*     TSH:     Magnesium:   Results from last 7 days   Lab Units 02/24/22  0518   MAGNESIUM mg/dL 1 9     Lipid Profile:     Imaging: I have personally reviewed pertinent reports      EKG:  Sinus rhythm with right bundle-branch block and frequent unifocal PVCs  VTE Prophylaxis: Sequential compression device Job Cortez)     Code Status: Level 1 - Full Code  Advance Directive and Living Will:      Power of :    POLST:      Valentino Church, [facility]  Cardiology

## 2022-02-24 NOTE — ASSESSMENT & PLAN NOTE
· Significant cardiac history with multiple recent hospitalizations  Had prolonged hospitalization at [facility] secondary to STEMI 10/22/21 s/p PATRICA to mid LCx complicated by cardiogenic shock and cardiac arrest secondary to VT x 2  Was d/c to rehab with T&P  · Presented to [facility] on 1/1/2022 from [place] [address] with cardiac arrest  She had ROSC pre hospital after AED shock x1  Seen by cardiology and did not feel arrest was ischemia-driven  Recommended ICD placement for secondary prevention  Patient's intervention deferred in setting of infected R  Lung bullae  Now s/p treatment x10D with doxycycline  · Discharged from [facility] on [facility] 2/10 with plan for elective ICD at 4 weeks  Denies any shocks delivered since vest initiation  · Records show Amiodarone 200mg PO BID starting 11/23  Unclear if continued in rehab  Will need to clarify with son/pharmacy-[facility] in AM- [phone]   Will order daily for now and increase to BID if needs loading  · Continuous cardiac monitoring  · Goal K>4, Mg>2

## 2022-02-24 NOTE — TELEPHONE ENCOUNTER
I cannot complete this form at this time as I have not seen patient since way before she had all of the medical issues that she does now, she needs a visit but she is currently admitted to the ICU

## 2022-02-24 NOTE — CASE MANAGEMENT
Case Management Assessment    Patient name Santa Levy  Location ICU 12/ICU 12 MRN [id number]  : 1965 Date 2022       Current Admission Date: 2022  Current Admission Diagnosis:Acute hypoxemic respiratory failure [facility])   Patient Active Problem List    Diagnosis Date Noted    [facility] ) 2022    CAD (coronary artery disease) 2021    Urinary retention 2021    Cerebral aneurysm 2021    Cerebral vascular accident ([facility] ) 2021    Acute hypoxemic respiratory failure ([facility] ) 2021    History of Cardiac arrest ([email or web address] ) 2021    A-fib ([email or web address] ) 10/30/2021    History of Ventricular tachycardia ([email or web address] ) 10/27/2021    Cellulitis of left lower extremity 2021    Hypoglycemia 2021    Polypharmacy 2021    Trigger finger, right middle finger 2021    Trigger finger, right ring finger 2021    Diarrhea 2021    Nasal vestibulitis 2021    Orthopnea 2021    CHANTALE (obstructive sleep apnea) 2021    Palpitations 2020    Abscess 10/11/2020    Bacterial vaginosis 2020    Urinary tract infection with hematuria 2020    Epigastric pain 2019    Thoracic aortic aneurysm without rupture ([email or web address] ) 2018    Abnormal urinalysis 2018    Vaginal discharge 2018    Yeast vaginitis 2018    Recurrent vaginitis 2018    Cardiac murmur 12/15/2017    Primary insomnia 12/15/2017    Anxiety 2017    Depression, recurrent ([email or web address] ) 2017    Retinal hemorrhage due to secondary diabetes ([email or web address] ) 2017    Fibromyalgia 2017    Hypertension 2017    Hypoestrogenism 2017    Neuropathy 2017    Chronic pain disorder 2017    Medically complex patient 2017    Change in bowel habit 2017    Screening for colon cancer 2016    Cough 02/15/2016    Non compliance with medical treatment 2016    Dizziness 2016    Gastroesophageal reflux disease with esophagitis 01/26/2016    Vertigo 01/26/2016    Vertebral body hemangioma 08/21/2015    Hemangioma of bone 07/30/2015    Right-sided thoracic back pain 07/23/2015    Thoracic radiculitis 07/23/2015    Carpal tunnel syndrome of left wrist 06/26/2015    Tobacco abuse 06/26/2015    Uncontrolled type 2 diabetes mellitus with complication, with long-term current use of insulin ([facility] ) 06/09/2015    Hyperlipidemia associated with type 2 diabetes mellitus ([facility] ) 05/06/2015    Noncompliance with diet and medication regimen 05/06/2015    Shingles 03/25/2015    Diabetic peripheral neuropathy ([facility] ) 02/25/2015    Low back pain 02/25/2015    Lumbar radiculopathy 02/25/2015    Overweight 02/25/2015    Sciatica of left side 02/25/2015      LOS (days): 0  Geometric Mean LOS (GMLOS) (days): 3 80  Days to GMLOS:3 4     OBJECTIVE:    Risk of Unplanned Readmission Score: 42         Current admission status: Inpatient       Preferred Pharmacy:   [address] "[employer] [address] [address] [address]  Phone: [phone] Fax: [address] [place] [address]  [address] [place] [address]  Phone: [phone] Fax: [phone]    [place] [address] [address][id number] - [address], [facility] [address] [place] [address] [address] [facility] [address]  Phone: [phone] Fax: [facility] [facility], [address] [place] [address]  Phone: [phone] Fax: [phone]    Primary Care Provider: Erika Maynard MD    Primary Insurance: MEDICARE  Secondary Insurance: [address]:  Active Health Care Agents    There are no active Health Care Agents on file            Patient Information  Admitted from[de-identified] Home  During Assessment patient was accompanied by: Not accompanied during assessment  Assessment information provided by[de-identified] Other - please comment  Primary Caregiver: Family  Caregiver's Name[de-identified] [address]: [address] of Residence: [address] [place] [address] do you live in?: [facility] COTTAGE entry access options  Select all that apply : Stairs  Number of steps to enter home : 4  Do the steps have railings?: No  Type of Current Residence: [address] [place] home  Upon entering residence, is there a bedroom on the main floor (no further steps)?: Yes  Upon entering residence, is there a bathroom on the main floor (no further steps)?: Yes  In the last 12 months, was there a time when you were not able to pay the mortgage or rent on time?: No  In the last 12 months, how many places have you lived?: 1  In the last 12 months, was there a time when you did not have a steady place to sleep or slept in a shelter (including now)?: No  Homeless/housing insecurity resource given?: N/A  Living Arrangements: Lives w/ Extended Family  Is patient a ?: No    Activities of Daily Living Prior to Admission  Functional Status: Total dependent  Completes ADLs independently?: No  Level of ADL dependence: Total Dependent  Ambulates independently?: No  Level of ambulatory dependence: Total Dependent  Does patient use assisted devices?: Yes  Assisted Devices (DME) used: Walker,Bedside Commode,Home Oxygen concentrator,Portable Oxygen tanks  DME Company Name (respiratory supplies): [employer]  O2 Rate(s): 10  Does patient currently own DME?: Yes  What DME does the patient currently own?: Bedside Commode,Walker,Home Oxygen concentrator,Portable Oxygen tanks  Does patient have a history of Outpatient Therapy (PT/OT)?: No  Does the patient have a history of Short-Term Rehab?: Yes [facility])  Does patient have a history of HHC?: No  Does patient currently have [facility]?: No    Current Home Health Care  Type of Current Home Care Services:  Attendant,Nurse visit    Patient Information Continued  Income Source: SSI/SSD  Does patient have prescription coverage?: Yes  Within the past 12 months, you worried that your food would run out before you got the money to buy more : Sometimes true  Within the past 12 months, the food you bought just didnt last and you didnt have money to get more : Sometimes true  Food insecurity resource given?: N/A  Does patient receive dialysis treatments?: No  Does patient have a history of substance abuse?: No  Does patient have a history of Mental Health Diagnosis?: No         Means of Transportation  Means of Transport to [facility] [place][de-identified] Lawyer Mathews  In the past 12 months, has lack of transportation kept you from medical appointments or from getting medications?: No  In the past 12 months, has lack of transportation kept you from meetings, work, or from getting things needed for daily living?: No  Was application for public transport provided?: N/A    Trey spoke with Son's girlfriend, as she lives with pt, and son was sleeping  She informed me pt just came [facility] from [facility] on 2/10 and has been in the ER 3 times since d/c  Pt was finally admitted,  She feels pt would have better care back in rehab as she feels she was d/c early due to pt wishes to return home  She also states since Pt lost job it has been tight financially for family  Cm printed and gave resources to family regarding utility help, and food help  Cm will continue to follow for discharge needs, family would like to discuss options after post acute stay closer to d/c

## 2022-02-24 NOTE — ED PROVIDER NOTES
History  Chief Complaint   Patient presents with    Shortness of Breath     Per pts son pt had started to become SOB x 1 hour ago   Respiratory Distress     This is a 20-year-old female presenting to the ED for evaluation of respiratory distress, brought in by her son  She states started 1-2 hours ago, suddenly  Hx of MI, cardiac arrest, CHF  Pt mentioned increasing edema  Has a trach in place  Unable to speak due to resp distress  History provided by:  Patient   used: No    Shortness of Breath  Severity:  Severe      Prior to Admission Medications   Prescriptions Last Dose Informant Patient Reported? Taking?    Blood Pressure Monitoring (Sphygmomanometer) MISC  Self No No   Sig: Use 2 (two) times a day   Patient not taking: Reported on 2021   Insulin Pen Needle (UNIFINE PENTIPS PLUS) 31G X 6 MM MISC  Self Yes No   Si Device by Does not apply route 4 (four) times a day   LORazepam (Ativan) 1 mg tablet   No No   Sig: Take 0 5 tablets (0 5 mg total) by mouth 2 (two) times a day as needed for anxiety for up to 10 days   Lancets MISC  Self Yes No   Si Device by Does not apply route 4 (four) times a day   Patient not taking: Reported on 2021   QUEtiapine (SEROquel) 25 mg tablet   No No   Sig: Take 1 tablet (25 mg total) by mouth daily at bedtime   acetaminophen (TYLENOL) 160 mg/5 mL suspension   No No   Si 4 mL (975 mg total) by Per G Tube route every 6 (six) hours as needed for mild pain or fever   albuterol (2 5 mg/3 mL) 0 083 % nebulizer solution   No No   Sig: Take 3 mL (2 5 mg total) by nebulization every 4 (four) hours as needed for wheezing   amiodarone 200 mg tablet   No No   Si tablet (200 mg total) by Per G Tube route 2 (two) times a day with meals   apixaban (ELIQUIS) 5 mg   No No   Sig: Take 1 tablet (5 mg total) by mouth 2 (two) times a day   apixaban (Eliquis) 5 mg   No No   Sig: Take 1 tablet (5 mg total) by mouth 2 (two) times a day for 7 days atorvastatin (LIPITOR) 40 mg tablet   No No   Sig: TAKE 1 TABLET BY MOUTH EVERY DAY   chlorhexidine (PERIDEX) 0 12 % solution   No No   Sig: Apply 15 mL to the mouth or throat every 12 (twelve) hours   famotidine (PEPCID) 20 mg/2 5 mL oral suspension   No No   Sig: Take 2 5 mL (20 mg total) by mouth 2 (two) times a day   furosemide (LASIX) 10 mg/mL oral solution   No No   Si mL (40 mg total) by Per G Tube route 2 (two) times a day   insulin regular (HumuLIN R,NovoLIN R) 100 units/mL injection   No No   Sig: Inject 0 02-0 1 mL (2-10 Units total) under the skin every 6 (six) hours   insulin regular (HumuLIN R,NovoLIN R) 100 units/mL injection   No No   Sig: Inject 0 3 mL (30 Units total) under the skin every 6 (six) hours   ipratropium (ATROVENT) 0 02 % nebulizer solution   No No   Sig: Take 2 5 mL (0 5 mg total) by nebulization 3 (three) times a day   levalbuterol (XOPENEX) 1 25 mg/0 5 mL nebulizer solution   No No   Sig: Take 0 5 mL (1 25 mg total) by nebulization 3 (three) times a day   losartan (COZAAR) 25 mg tablet   No No   Sig: Take 1 tablet (25 mg total) by mouth daily   melatonin 3 mg   No No   Sig: Take 2 tablets (6 mg total) by mouth daily at bedtime   metoprolol tartrate (LOPRESSOR) 25 mg tablet   No No   Sig: Take 1 tablet (25 mg total) by mouth every 12 (twelve) hours   ondansetron (ZOFRAN) 4 mg/2 mL injection   No No   Sig: Infuse 2 mL (4 mg total) into a venous catheter every 6 (six) hours as needed for nausea or vomiting   polyethylene glycol (MIRALAX) 17 g packet   No No   Sig: Take 17 g by mouth daily   potassium chloride 10% oral solution   No No   Sig: Take 15 mL (20 mEq total) by mouth daily   pregabalin (LYRICA) 75 mg capsule  Self No No   Sig: TAKE ONE CAPSULE EVERY DAY   senna-docusate sodium (SENOKOT S) 8 6-50 mg per tablet   No No   Sig: Take 1 tablet by mouth daily at bedtime   ticagrelor (BRILINTA) 90 MG   No No   Sig: Take 1 tablet (90 mg total) by mouth every 12 (twelve) hours Facility-Administered Medications: None       Past Medical History:   Diagnosis Date    Anxiety     Aortic aneurysm (HCC)     Arthritis     Depression     Diabetes mellitus (HCC)     Fibromyalgia     GERD (gastroesophageal reflux disease)     GERD (gastroesophageal reflux disease)     H/O cardiovascular stress test 2018    no ischemia  EF 70%   H/O echocardiogram 2019    EF 60%  Mild LVH  trivial effusion   Hyperlipidemia     Hypertension     Migraines     Psychiatric disorder     anxiety    Uncontrolled hypertension 2015    Last Assessment & Plan:  BP today above goal <140/90, apparently asymptomatic  Prior BP elevations were attributed to not taking medication  Consider increased medication if persistent on f/u  Past Surgical History:   Procedure Laterality Date    BACK SURGERY      Lumbar epidural steroid injection    CARDIAC CATHETERIZATION N/A 10/22/2021    Procedure: Cardiac pci;  Surgeon: Aaron Daniel MD;  [facility]; Service: Cardiology    CARDIAC CATHETERIZATION  10/22/2021    Procedure: Cardiac catheterization;  Surgeon: Aaron Daniel MD;  [facility]; Service: Cardiology    CARDIAC CATHETERIZATION Left 10/27/2021    Procedure: Cardiac Left Heart Cath;  Surgeon: Nara Robles MD;  [facility]; Service: Cardiology    CARDIAC CATHETERIZATION N/A 10/27/2021    Procedure: Cardiac pci;  Surgeon: Nara Robles MD;  [facility]; Service: Cardiology    CARDIAC CATHETERIZATION N/A 10/28/2021    Procedure: Cardiac pci;  Surgeon: Alberto Ramos DO;  [facility];   Service: Cardiology    CARPAL TUNNEL RELEASE Left      SECTION      CHOLECYSTECTOMY      COLONOSCOPY      incomplete    COLONOSCOPY      EYE SURGERY      HYSTERECTOMY      Total    OVARIAN CYST REMOVAL      PEG W/TRACHEOSTOMY PLACEMENT N/A 2021    Procedure: TRACHEOSTOMY WITH INSERTION PEG TUBE;  Surgeon: Ely Wang To, DO;  Location: [facility];  Service: General    TUBAL LIGATION      UPPER GASTROINTESTINAL ENDOSCOPY         Family History   Problem Relation Age of Onset    Hypertension Mother    Charlie White Arthritis Mother     Diabetes Father     Other Sister         renal cell carcinoma    Diabetes Other     Factor V Leiden deficiency Other      I have reviewed and agree with the history as documented  E-Cigarette/Vaping    E-Cigarette Use Never User      E-Cigarette/Vaping Substances    Nicotine No     THC No     CBD No     Flavoring No     Other No     Unknown No      Social History     Tobacco Use    Smoking status: Former Smoker     Packs/day: 1 00     Years: 30 00     Pack years: 30 00     Types: Cigarettes    Smokeless tobacco: Never Used   Vaping Use    Vaping Use: Never used   Substance Use Topics    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   Drug use: Yes     Types: Marijuana     Comment: medical; seldom use       Review of Systems   Respiratory: Positive for shortness of breath  All other systems reviewed and are negative  Physical Exam  Physical Exam  Vitals and nursing note reviewed  Constitutional:       General: She is in acute distress  Appearance: Normal appearance  She is well-developed and normal weight  She is ill-appearing and diaphoretic  HENT:      Head: Normocephalic and atraumatic  Right Ear: External ear normal       Left Ear: External ear normal       Nose: Nose normal       Mouth/Throat:      Mouth: Mucous membranes are moist       Pharynx: Oropharynx is clear  Eyes:      Extraocular Movements: Extraocular movements intact  Conjunctiva/sclera: Conjunctivae normal       Pupils: Pupils are equal, round, and reactive to light  Cardiovascular:      Rate and Rhythm: Regular rhythm  Tachycardia present  Pulses: Normal pulses  Heart sounds: Normal heart sounds     Pulmonary:      Effort: Tachypnea, accessory muscle usage and respiratory distress present  Breath sounds: Examination of the right-upper field reveals rales  Examination of the left-upper field reveals rales  Examination of the right-middle field reveals rales  Examination of the left-middle field reveals rales  Examination of the right-lower field reveals rales  Examination of the left-lower field reveals rales  Decreased breath sounds and rales present  No wheezing or rhonchi  Comments: trach'd  Abdominal:      General: Abdomen is flat  Bowel sounds are normal  There is no distension  Palpations: Abdomen is soft  Tenderness: There is no abdominal tenderness  Musculoskeletal:         General: No swelling or tenderness  Normal range of motion  Cervical back: Normal range of motion and neck supple  Right lower leg: Edema present  Left lower leg: Edema present  Comments: Severe b/l pitting edema 3+   Skin:     General: Skin is warm  Capillary Refill: Capillary refill takes less than 2 seconds  Neurological:      General: No focal deficit present  Mental Status: She is alert and oriented to person, place, and time  Mental status is at baseline     Psychiatric:         Mood and Affect: Mood normal          Behavior: Behavior normal          Vital Signs  ED Triage Vitals   Temperature Pulse Respirations Blood Pressure SpO2   02/23/22 2012 02/23/22 2012 02/23/22 2012 02/23/22 2012 02/23/22 2012   (!) 97 4 °F (36 3 °C) 90 (!) 25 (!) 169/108 93 %      Temp Source Heart Rate Source Patient Position - Orthostatic VS BP Location FiO2 (%)   02/23/22 2012 02/23/22 2012 -- -- 02/23/22 2025   Oral Monitor   50      Pain Score       --                  Vitals:    02/23/22 2245 02/23/22 2300 02/23/22 2315 02/23/22 2330   BP: 115/67 145/83 138/87 149/82   Pulse: 76 74 74 74         Visual Acuity      ED Medications  Medications   midazolam (VERSED) injection 2 mg (2 mg Intravenous Given 2/23/22 2027) nitroglycerin (NITROSTAT) SL tablet 0 4 mg (0 4 mg Sublingual Given 2/23/22 2020)   furosemide (LASIX) injection 40 mg (40 mg Intravenous Given 2/23/22 2047)   fentanyl citrate (PF) 100 MCG/2ML 25 mcg (25 mcg Intravenous Given 2/23/22 2048)   nitroglycerin (NITRO-BID) 2 % TD ointment 1 inch (1 inch Topical Given 2/23/22 2045)   midazolam (VERSED) 2 mg/2 mL injection **ADS Override Pull** (2 mg  Given 2/23/22 2109)   midazolam (VERSED) injection 2 mg (0 mg Intravenous Override Pull 2/23/22 2114)   fentanyl citrate (PF) 100 MCG/2ML 50 mcg (50 mcg Intravenous Given 2/23/22 2117)   magnesium sulfate 2 g/50 mL IVPB (premix) 2 g (2 g Intravenous New Bag 2/23/22 2205)   potassium chloride 20 mEq IVPB (premix) (20 mEq Intravenous New Bag 2/23/22 2207)   midazolam (VERSED) injection 2 mg (2 mg Intravenous Given 2/23/22 2157)   iohexol (OMNIPAQUE) 350 MG/ML injection (SINGLE-DOSE) 100 mL (100 mL Intravenous Given 2/23/22 2156)       Diagnostic Studies  Results Reviewed     Procedure Component Value Units Date/Time    Blood culture #2 [[id number]] Collected: 02/23/22 2038    Lab Status: Preliminary result Specimen: Blood from Line, Venous Updated: 02/26/22 0101     Blood Culture No Growth at 48 hrs  Blood culture #1 [[id number]] Collected: 02/23/22 2038    Lab Status: Preliminary result Specimen: Blood from Line, Venous Updated: 02/24/22 0853     Blood Culture No Growth at 24 hrs  HS Troponin I 2hr [[id number]]  (Normal) Collected: 02/23/22 2220    Lab Status: Final result Specimen: Blood from Arm, Right Updated: 02/23/22 2250     hs TnI 2hr 27 ng/L      Delta 2hr hsTnI -2 ng/L     Lactic acid 2 Hours [[id number]]  (Normal) Collected: 02/23/22 2220    Lab Status: Final result Specimen: Blood from Arm, Right Updated: 02/23/22 2245     LACTIC ACID 1 6 mmol/L     Narrative:      Result may be elevated if tourniquet was used during collection      HS Troponin 0hr (reflex protocol) [[id number]]  (Normal) Collected: 02/23/22 2038 Lab Status: Final result Specimen: Blood from Arm, Right Updated: 02/23/22 2212     hs TnI 0hr 29 ng/L     COVID/FLU/RSV - 2 hour TAT [[id number]]  (Normal) Collected: 02/23/22 2037    Lab Status: Final result Specimen: Nares from Nose Updated: 02/23/22 2139     SARS-CoV-2 Negative     INFLUENZA A PCR Negative     INFLUENZA B PCR Negative     RSV PCR Negative    Narrative:      FOR PEDIATRIC PATIENTS - copy/paste COVID Guidelines URL to browser: [email or web address]    SARS-CoV-2 assay is a Nucleic Acid Amplification assay intended for the  qualitative detection of nucleic acid from SARS-CoV-2 in nasopharyngeal  swabs  Results are for the presumptive identification of SARS-CoV-2 RNA  Positive results are indicative of infection with SARS-CoV-2, the virus  causing COVID-19, but do not rule out bacterial infection or co-infection  with other viruses  Laboratories within the [place] and its  territories are required to report all positive results to the appropriate  public health authorities  Negative results do not preclude SARS-CoV-2  infection and should not be used as the sole basis for treatment or other  patient management decisions  Negative results must be combined with  clinical observations, patient history, and epidemiological information  This test has not been FDA cleared or approved  This test has been authorized by FDA under an Emergency Use Authorization  (EUA)  This test is only authorized for the duration of time the  declaration that circumstances exist justifying the authorization of the  emergency use of an in vitro diagnostic tests for detection of SARS-CoV-2  virus and/or diagnosis of COVID-19 infection under section 564(b)(1) of  the Act, 21 U  S C  089DOG-2(G)(7), unless the authorization is terminated  or revoked sooner  The test has been validated but independent review by FDA  and CLIA is pending      Test performed using [employer] GeneXpert: This RT-PCR assay targets N2,  a region unique to SARS-CoV-2  A conserved region in the E-gene was chosen  for pan-Sarbecovirus detection which includes SARS-CoV-2  Lactic acid [[id number]]  (Abnormal) Collected: 02/23/22 2038    Lab Status: Final result Specimen: Blood from Arm, Right Updated: 02/23/22 2116     LACTIC ACID 2 4 mmol/L     Narrative:      Result may be elevated if tourniquet was used during collection      B-Type Natriuretic Peptide(BNP) CA, GH, EA Campuses Only [[id number]]  (Abnormal) Collected: 02/23/22 2038    Lab Status: Final result Specimen: Blood from Arm, Right Updated: 02/23/22 2114      1 pg/mL     Comprehensive metabolic panel [[id number]]  (Abnormal) Collected: 02/23/22 2038    Lab Status: Final result Specimen: Blood from Arm, Right Updated: 02/23/22 2112     Sodium 145 mmol/L      Potassium 3 3 mmol/L      Chloride 108 mmol/L      CO2 24 mmol/L      ANION GAP 13 mmol/L      BUN 13 mg/dL      Creatinine 0 91 mg/dL      Glucose 205 mg/dL      Calcium 9 1 mg/dL      AST 14 U/L      ALT 13 U/L      Alkaline Phosphatase 92 1 U/L      Total Protein 7 6 g/dL      Albumin 3 9 g/dL      Total Bilirubin 0 69 mg/dL      eGFR 71 ml/min/1 73sq m     Narrative:      Jamin guidelines for Chronic Kidney Disease (CKD):     Stage 1 with normal or high GFR (GFR > 90 mL/min/1 73 square meters)    Stage 2 Mild CKD (GFR = 60-89 mL/min/1 73 square meters)    Stage 3A Moderate CKD (GFR = 45-59 mL/min/1 73 square meters)    Stage 3B Moderate CKD (GFR = 30-44 mL/min/1 73 square meters)    Stage 4 Severe CKD (GFR = 15-29 mL/min/1 73 square meters)    Stage 5 End Stage CKD (GFR <15 mL/min/1 73 square meters)  Note: GFR calculation is accurate only with a steady state creatinine    Magnesium [[id number]]  (Abnormal) Collected: 02/23/22 2038    Lab Status: Final result Specimen: Blood from Arm, Right Updated: 02/23/22 2112     Magnesium 1 5 mg/dL D-Dimer [[id number]]  (Abnormal) Collected: 02/23/22 2038    Lab Status: Final result Specimen: Blood from Arm, Right Updated: 02/23/22 2100     D-Dimer, Quant 2 55 ug/ml FEU     Narrative: In the evaluation for possible pulmonary embolism, in the appropriate (Well's Score of 4 or less) patient, the age adjusted d-dimer cutoff for this patient can be calculated as:    Age x 0 01 (in ug/mL) for Age-adjusted D-dimer exclusion threshold for a patient over 50 years      Protime-INR [[id number]]  (Abnormal) Collected: 02/23/22 2038    Lab Status: Final result Specimen: Blood from Arm, Right Updated: 02/23/22 2059     Protime 16 2 seconds      INR 1 32    APTT [[id number]]  (Normal) Collected: 02/23/22 2038    Lab Status: Final result Specimen: Blood from Arm, Right Updated: 02/23/22 2059     PTT 25 seconds     Blood gas, venous [[id number]] Collected: 02/23/22 2038    Lab Status: Final result Specimen: Blood from Arm, Right Updated: 02/23/22 2049     pH, Daquan 7 367     pCO2, Daquan 43 1 mm Hg      pO2, Daquan 35 0 mm Hg      HCO3, Daquan 24 2 mmol/L      Base Excess, Daquan -1 2 mmol/L      O2 Content, Daquan 9 1 ml/dL      O2 HGB, VENOUS 61 4 %     CBC and differential [[id number]]  (Abnormal) Collected: 02/23/22 2038    Lab Status: Final result Specimen: Blood from Arm, Right Updated: 02/23/22 2048     WBC 9 86 Thousand/uL      RBC 4 08 Million/uL      Hemoglobin 10 4 g/dL      Hematocrit 34 0 %      MCV 83 fL      MCH 25 5 pg      MCHC 30 6 g/dL      RDW 18 7 %      MPV 11 6 fL      Platelets 011 Thousands/uL      nRBC 0 /100 WBCs      Neutrophils Relative 69 %      Immat GRANS % 0 %      Lymphocytes Relative 22 %      Monocytes Relative 7 %      Eosinophils Relative 2 %      Basophils Relative 0 %      Neutrophils Absolute 6 78 Thousands/µL      Immature Grans Absolute 0 04 Thousand/uL      Lymphocytes Absolute 2 16 Thousands/µL      Monocytes Absolute 0 65 Thousand/µL      Eosinophils Absolute 0 21 Thousand/µL      Basophils Absolute 0 02 Thousands/µL                  CTA ED chest PE Study   Final Result by Alejandro Jc MD (02/23 2241)      1  No evidence of pulmonary embolism  2   Mild diffuse ground glass opacities in the lungs may be due to pulmonary edema  Small bilateral pleural effusions  Cardiomegaly  Correlate for heart failure  3   Patchy opacities at the bilateral lower lobe bases and within the posterior left upper lobe which may be due to atelectasis and/or pneumonia in the appropriate clinical setting  Workstation performed: [id number]         XR chest 1 view portable   ED Interpretation by Odette Segal DO (02/23 2120)   Bilateral pleural effusions, pulm vasc congestion  Final Result by Kay Charles MD (02/24 4106)      Stable mild pulmonary edema and small left pleural effusion  Workstation performed: [id number]                    Procedures  ECG 12 Lead Documentation Only    Date/Time: 2/23/2022 8:45 PM  Performed by: Odette Segal DO  Authorized by: Odette Segal DO     Indications / Diagnosis:  Sob  ECG reviewed by me, the ED Provider: yes    Patient location:  ED  Interpretation:     Interpretation: normal    Rate:     ECG rate assessment: normal    Rhythm:     Rhythm: sinus rhythm    Ectopy:     Ectopy: none    QRS:     QRS axis:  Normal    QRS intervals:  Normal  Conduction:     Conduction: normal    ST segments:     ST segments:  Normal  Laceration repair    Date/Time: 2/23/2022 10:24 PM  Performed by: Odette Segal DO  Authorized by: Odette Segal DO   Consent: Verbal consent obtained    Risks and benefits: risks, benefits and alternatives were discussed  Consent given by: patient  Patient understanding: patient states understanding of the procedure being performed  Patient consent: the patient's understanding of the procedure matches consent given  Procedure consent: procedure consent matches procedure scheduled  Relevant documents: relevant documents present and verified  Test results: test results available and properly labeled  Site marked: the operative site was marked  Radiology Images displayed and confirmed  If images not available, report reviewed: imaging studies available  Required items: required blood products, implants, devices, and special equipment available  Patient identity confirmed: verbally with patient  Time out: Immediately prior to procedure a "time out" was called to verify the correct patient, procedure, equipment, support staff and site/side marked as required  Body area: upper extremity  Location details: right thumb  Laceration length: 0 5 cm  Tendon involvement: none  Nerve involvement: none  Vascular damage: no    Sedation:  Patient sedated: no      Wound Dehiscence:  Superficial Wound Dehiscence: simple closure      Procedure Details:  Preparation: Patient was prepped and draped in the usual sterile fashion    Skin closure: glue  Approximation: loose  Approximation difficulty: simple  Patient tolerance: patient tolerated the procedure well with no immediate complications    CriticalCare Time  Performed by: Mitch Lizama DO  Authorized by: Mitch Lizama DO     Critical care provider statement:     Critical care time (minutes):  80    Critical care time was exclusive of:  Separately billable procedures and treating other patients and teaching time    Critical care was necessary to treat or prevent imminent or life-threatening deterioration of the following conditions:  Respiratory failure    Critical care was time spent personally by me on the following activities:  Blood draw for specimens, obtaining history from patient or surrogate, development of treatment plan with patient or surrogate, discussions with consultants, discussions with primary provider, evaluation of patient's response to treatment, examination of patient, review of old charts, re-evaluation of patient's condition, ordering and review of radiographic studies, ordering and review of laboratory studies and ordering and performing treatments and interventions    I assumed direction of critical care for this patient from another provider in my specialty: no               ED Course  ED Course as of 02/26/22 0907 Wed Feb 23, 2022 2046 Patient noted to be in severe respiratory distress  Lungs very wet, with rales bilaterally, severe pitting peripheral edema to lower legs  Londell Lofty noted to be in place, replaced with a cuffed trach and placed on the ventilator  Sublingual nitro was given for suspected acute CHF with blood pressures 160s over 130s  IV Lasix, Versed, fentanyl also ordered  Patient is now much more comfortable on CPAP settings  2156 D/w Dr Artur Jose, accepts to service, ICU at [address]   2318 Pt doing well, resting comfortably, sleeping, BP down  Transport eta 11:30                                               MDM  Number of Diagnoses or Management Options  Acute respiratory failure [facility]): new and requires workup  COPD with acute exacerbation [facility]): new and requires workup     Amount and/or Complexity of Data Reviewed  Clinical lab tests: ordered and reviewed  Tests in the radiology section of CPT®: ordered and reviewed    Risk of Complications, Morbidity, and/or Mortality  Presenting problems: high  Diagnostic procedures: high  Management options: high    Patient Progress  Patient progress: improved      Disposition  Final diagnoses:   Acute respiratory failure ([facility] )   CHF (congestive heart failure) ([facility] )     Time reflects when diagnosis was documented in both MDM as applicable and the Disposition within this note     Time User Action Codes Description Comment    2/23/2022  9:59 PM Italo Davis Add [J96 00] [facility]     2/23/2022  9:59 PM Isai Wadsworth Add [J44 1] COPD with acute exacerbation ([facility] )     2/26/2022  9:07 AM Isai Wadsworth Remove [J44 1] COPD with acute exacerbation ([facility] )     2/26/2022  9:07 AM Isai Royal Add [I50 9] CHF (congestive heart failure) [facility])       ED Disposition     ED Disposition Condition Date/Time Comment    Transfer to Another Facility-In Network  Wed Feb 23, 2022  9:59 PM Kelli Red should be transferred out to [facility]           MD Documentation      Most Recent Value   Patient Condition The patient has been stabilized such that within reasonable medical probability, no material deterioration of the patient condition or the condition of the unborn child(yamini) is likely to result from the transfer   Reason for Transfer Level of Care needed not available at this facility   Benefits of Transfer Specialized equipment and/or services available at the receiving facility (Include comment)________________________   Risks of Transfer Potential for delay in receiving treatment, Potential deterioration of medical condition, Loss of IV, Increased discomfort during transfer   Accepting Physician Dr Caresse Buerger Name, Kayla Quiroz   Sending MD [place]   Provider Certification General risk, such as traffic hazards, adverse weather conditions, rough terrain or turbulence, possible failure of equipment (including vehicle or aircraft), or consequences of actions of persons outside the control of the transport personnel, Unanticipated needs of medical equipment and personnel during transport, Risk of worsening condition, The possibility of a transport vehicle being unavailable      RN Documentation      Most Recent Value   Accepting Facility Name, Kayla Quiroz   Bed Assignment ICU 12   Report Given to [facility]      Follow-up Information    None         Discharge Medication List as of 2/24/2022 12:05 AM      CONTINUE these medications which have NOT CHANGED    Details   acetaminophen (TYLENOL) 160 mg/5 mL suspension 30 4 mL (975 mg total) by Per G Tube route every 6 (six) hours as needed for mild pain or fever, Starting Tue 11/23/2021, No Print      albuterol (2 5 mg/3 mL) 0  083 % nebulizer solution Take 3 mL (2 5 mg total) by nebulization every 4 (four) hours as needed for wheezing, Starting Tue 11/23/2021, No Print      amiodarone 200 mg tablet 1 tablet (200 mg total) by Per G Tube route 2 (two) times a day with meals, Starting Tue 11/23/2021, No Print      apixaban (ELIQUIS) 5 mg Take 1 tablet (5 mg total) by mouth 2 (two) times a day, Starting Fri 11/19/2021, Normal      atorvastatin (LIPITOR) 40 mg tablet TAKE 1 TABLET BY MOUTH EVERY DAY, Normal      Blood Pressure Monitoring (Sphygmomanometer) MISC Use 2 (two) times a day, Starting Fri 4/30/2021, Normal      chlorhexidine (PERIDEX) 0 12 % solution Apply 15 mL to the mouth or throat every 12 (twelve) hours, Starting Tue 11/23/2021, No Print      famotidine (PEPCID) 20 mg/2 5 mL oral suspension Take 2 5 mL (20 mg total) by mouth 2 (two) times a day, Starting Tue 11/23/2021, No Print      furosemide (LASIX) 10 mg/mL oral solution 4 mL (40 mg total) by Per G Tube route 2 (two) times a day, Starting Tue 11/23/2021, No Print      Insulin Pen Needle (UNIFINE PENTIPS PLUS) 31G X 6 MM MISC 1 Device by Does not apply route 4 (four) times a day, Starting Mon 6/5/2017, Historical Med      !! insulin regular (HumuLIN R,NovoLIN R) 100 units/mL injection Inject 0 02-0 1 mL (2-10 Units total) under the skin every 6 (six) hours, Starting Tue 11/23/2021, No Print      !! insulin regular (HumuLIN R,NovoLIN R) 100 units/mL injection Inject 0 3 mL (30 Units total) under the skin every 6 (six) hours, Starting Tue 11/23/2021, No Print      ipratropium (ATROVENT) 0 02 % nebulizer solution Take 2 5 mL (0 5 mg total) by nebulization 3 (three) times a day, Starting Tue 11/23/2021, No Print      Lancets MISC 1 Device by Does not apply route 4 (four) times a day, Historical Med      levalbuterol (XOPENEX) 1 25 mg/0 5 mL nebulizer solution Take 0 5 mL (1 25 mg total) by nebulization 3 (three) times a day, Starting Tue 11/23/2021, No Print      LORazepam (Ativan) 1 mg tablet Take 0 5 tablets (0 5 mg total) by mouth 2 (two) times a day as needed for anxiety for up to 10 days, Starting Mon 2/21/2022, Until Thu 3/3/2022 at 2359, Normal      losartan (COZAAR) 25 mg tablet Take 1 tablet (25 mg total) by mouth daily, Starting Wed 11/24/2021, No Print      melatonin 3 mg Take 2 tablets (6 mg total) by mouth daily at bedtime, Starting Tue 11/23/2021, No Print      metoprolol tartrate (LOPRESSOR) 25 mg tablet Take 1 tablet (25 mg total) by mouth every 12 (twelve) hours, Starting Tue 11/23/2021, No Print      ondansetron (ZOFRAN) 4 mg/2 mL injection Infuse 2 mL (4 mg total) into a venous catheter every 6 (six) hours as needed for nausea or vomiting, Starting Tue 11/23/2021, No Print      polyethylene glycol (MIRALAX) 17 g packet Take 17 g by mouth daily, Starting Tue 11/23/2021, No Print      potassium chloride 10% oral solution Take 15 mL (20 mEq total) by mouth daily, Starting Wed 11/24/2021, No Print      pregabalin (LYRICA) 75 mg capsule TAKE ONE CAPSULE EVERY DAY, Normal      QUEtiapine (SEROquel) 25 mg tablet Take 1 tablet (25 mg total) by mouth daily at bedtime, Starting Tue 11/23/2021, No Print      senna-docusate sodium (SENOKOT S) 8 6-50 mg per tablet Take 1 tablet by mouth daily at bedtime, Starting Tue 11/23/2021, No Print      ticagrelor (BRILINTA) 90 MG Take 1 tablet (90 mg total) by mouth every 12 (twelve) hours, Starting Fri 10/22/2021, Normal       !! - Potential duplicate medications found  Please discuss with provider  No discharge procedures on file      PDMP Review       Value Time User    PDMP Reviewed  Yes 3/8/2021  9:59 PM Shanika Gunderson DO          ED Provider  Electronically Signed by           Shanika Gunderson DO  02/26/22 1036

## 2022-02-24 NOTE — ASSESSMENT & PLAN NOTE
· History of afib on Eliquis  · Currently in NSR with R  BBB  · Continuous cardiac monitoring  · Continue Eliquis

## 2022-02-25 PROBLEM — J38.6 SUBGLOTTIC STENOSIS: Status: ACTIVE | Noted: 2022-02-25

## 2022-02-25 LAB
ANION GAP SERPL CALCULATED.3IONS-SCNC: 8 MMOL/L (ref 4–13)
BUN SERPL-MCNC: 14 MG/DL (ref 5–25)
CA-I BLD-SCNC: 1.11 MMOL/L (ref 1.12–1.32)
CALCIUM SERPL-MCNC: 8.2 MG/DL (ref 8.3–10.1)
CHLORIDE SERPL-SCNC: 110 MMOL/L (ref 100–108)
CO2 SERPL-SCNC: 27 MMOL/L (ref 21–32)
CREAT SERPL-MCNC: 0.9 MG/DL (ref 0.6–1.3)
ERYTHROCYTE [DISTWIDTH] IN BLOOD BY AUTOMATED COUNT: 19.3 % (ref 11.6–15.1)
GFR SERPL CREATININE-BSD FRML MDRD: 72 ML/MIN/1.73SQ M
GLUCOSE SERPL-MCNC: 131 MG/DL (ref 65–140)
GLUCOSE SERPL-MCNC: 153 MG/DL (ref 65–140)
GLUCOSE SERPL-MCNC: 163 MG/DL (ref 65–140)
GLUCOSE SERPL-MCNC: 163 MG/DL (ref 65–140)
GLUCOSE SERPL-MCNC: 176 MG/DL (ref 65–140)
GLUCOSE SERPL-MCNC: 186 MG/DL (ref 65–140)
GLUCOSE SERPL-MCNC: 190 MG/DL (ref 65–140)
HCT VFR BLD AUTO: 27 % (ref 34.8–46.1)
HGB BLD-MCNC: 7.9 G/DL (ref 11.5–15.4)
MAGNESIUM SERPL-MCNC: 2.3 MG/DL (ref 1.6–2.6)
MCH RBC QN AUTO: 24.9 PG (ref 26.8–34.3)
MCHC RBC AUTO-ENTMCNC: 29.3 G/DL (ref 31.4–37.4)
MCV RBC AUTO: 85 FL (ref 82–98)
MRSA NOSE QL CULT: NORMAL
PHOSPHATE SERPL-MCNC: 4.7 MG/DL (ref 2.7–4.5)
PLATELET # BLD AUTO: 287 THOUSANDS/UL (ref 149–390)
PMV BLD AUTO: 11 FL (ref 8.9–12.7)
POTASSIUM SERPL-SCNC: 3.9 MMOL/L (ref 3.5–5.3)
PROCALCITONIN SERPL-MCNC: 0.13 NG/ML
RBC # BLD AUTO: 3.17 MILLION/UL (ref 3.81–5.12)
SODIUM SERPL-SCNC: 145 MMOL/L (ref 136–145)
WBC # BLD AUTO: 8.09 THOUSAND/UL (ref 4.31–10.16)

## 2022-02-25 PROCEDURE — 84100 ASSAY OF PHOSPHORUS: CPT | Performed by: NURSE PRACTITIONER

## 2022-02-25 PROCEDURE — 82330 ASSAY OF CALCIUM: CPT | Performed by: NURSE PRACTITIONER

## 2022-02-25 PROCEDURE — 85027 COMPLETE CBC AUTOMATED: CPT | Performed by: NURSE PRACTITIONER

## 2022-02-25 PROCEDURE — 97535 SELF CARE MNGMENT TRAINING: CPT

## 2022-02-25 PROCEDURE — 83735 ASSAY OF MAGNESIUM: CPT | Performed by: NURSE PRACTITIONER

## 2022-02-25 PROCEDURE — 99232 SBSQ HOSP IP/OBS MODERATE 35: CPT | Performed by: INTERNAL MEDICINE

## 2022-02-25 PROCEDURE — 80048 BASIC METABOLIC PNL TOTAL CA: CPT | Performed by: NURSE PRACTITIONER

## 2022-02-25 PROCEDURE — 97163 PT EVAL HIGH COMPLEX 45 MIN: CPT

## 2022-02-25 PROCEDURE — 94760 N-INVAS EAR/PLS OXIMETRY 1: CPT

## 2022-02-25 PROCEDURE — 94003 VENT MGMT INPAT SUBQ DAY: CPT

## 2022-02-25 PROCEDURE — 97110 THERAPEUTIC EXERCISES: CPT

## 2022-02-25 PROCEDURE — 84145 PROCALCITONIN (PCT): CPT | Performed by: NURSE PRACTITIONER

## 2022-02-25 PROCEDURE — 82948 REAGENT STRIP/BLOOD GLUCOSE: CPT

## 2022-02-25 PROCEDURE — 94640 AIRWAY INHALATION TREATMENT: CPT

## 2022-02-25 PROCEDURE — 99233 SBSQ HOSP IP/OBS HIGH 50: CPT | Performed by: INTERNAL MEDICINE

## 2022-02-25 PROCEDURE — 97167 OT EVAL HIGH COMPLEX 60 MIN: CPT

## 2022-02-25 RX ORDER — POTASSIUM CHLORIDE 20MEQ/15ML
20 LIQUID (ML) ORAL ONCE
Status: COMPLETED | OUTPATIENT
Start: 2022-02-25 | End: 2022-02-25

## 2022-02-25 RX ORDER — POTASSIUM CHLORIDE 20 MEQ/1
20 TABLET, EXTENDED RELEASE ORAL ONCE
Status: DISCONTINUED | OUTPATIENT
Start: 2022-02-25 | End: 2022-02-25

## 2022-02-25 RX ORDER — SODIUM CHLORIDE 30 MG/ML INHALATION SOLUTION 30 MG/ML
4 SOLUTION INHALANT
Status: DISCONTINUED | OUTPATIENT
Start: 2022-02-25 | End: 2022-02-26

## 2022-02-25 RX ORDER — CALCIUM GLUCONATE 20 MG/ML
1 INJECTION, SOLUTION INTRAVENOUS ONCE
Status: COMPLETED | OUTPATIENT
Start: 2022-02-25 | End: 2022-02-25

## 2022-02-25 RX ADMIN — CHLORHEXIDINE GLUCONATE 0.12% ORAL RINSE 15 ML: 1.2 LIQUID ORAL at 08:41

## 2022-02-25 RX ADMIN — SODIUM CHLORIDE SOLN NEBU 3% 4 ML: 3 NEBU SOLN at 08:57

## 2022-02-25 RX ADMIN — CHLORHEXIDINE GLUCONATE 0.12% ORAL RINSE 15 ML: 1.2 LIQUID ORAL at 20:35

## 2022-02-25 RX ADMIN — IPRATROPIUM BROMIDE 0.5 MG: 0.5 SOLUTION RESPIRATORY (INHALATION) at 08:40

## 2022-02-25 RX ADMIN — APIXABAN 5 MG: 5 TABLET, FILM COATED ORAL at 17:14

## 2022-02-25 RX ADMIN — PREGABALIN 75 MG: 75 CAPSULE ORAL at 08:41

## 2022-02-25 RX ADMIN — CALCIUM GLUCONATE 1 G: 20 INJECTION, SOLUTION INTRAVENOUS at 08:10

## 2022-02-25 RX ADMIN — BUDESONIDE 0.5 MG: 0.5 INHALANT ORAL at 08:40

## 2022-02-25 RX ADMIN — Medication 6 MG: at 21:07

## 2022-02-25 RX ADMIN — LORAZEPAM 0.5 MG: 0.5 TABLET ORAL at 19:26

## 2022-02-25 RX ADMIN — TICAGRELOR 90 MG: 90 TABLET ORAL at 08:42

## 2022-02-25 RX ADMIN — FAMOTIDINE 20 MG: 40 POWDER, FOR SUSPENSION ORAL at 17:14

## 2022-02-25 RX ADMIN — INSULIN LISPRO 2 UNITS: 100 INJECTION, SOLUTION INTRAVENOUS; SUBCUTANEOUS at 11:09

## 2022-02-25 RX ADMIN — SODIUM CHLORIDE SOLN NEBU 3% 4 ML: 3 NEBU SOLN at 14:07

## 2022-02-25 RX ADMIN — LEVALBUTEROL HYDROCHLORIDE 1.25 MG: 1.25 SOLUTION, CONCENTRATE RESPIRATORY (INHALATION) at 14:07

## 2022-02-25 RX ADMIN — IPRATROPIUM BROMIDE 0.5 MG: 0.5 SOLUTION RESPIRATORY (INHALATION) at 19:37

## 2022-02-25 RX ADMIN — LEVALBUTEROL HYDROCHLORIDE 1.25 MG: 1.25 SOLUTION, CONCENTRATE RESPIRATORY (INHALATION) at 08:40

## 2022-02-25 RX ADMIN — APIXABAN 5 MG: 5 TABLET, FILM COATED ORAL at 08:42

## 2022-02-25 RX ADMIN — FAMOTIDINE 20 MG: 40 POWDER, FOR SUSPENSION ORAL at 08:47

## 2022-02-25 RX ADMIN — FUROSEMIDE 40 MG: 10 INJECTION, SOLUTION INTRAMUSCULAR; INTRAVENOUS at 17:00

## 2022-02-25 RX ADMIN — METOPROLOL TARTRATE 25 MG: 25 TABLET, FILM COATED ORAL at 08:41

## 2022-02-25 RX ADMIN — LEVALBUTEROL HYDROCHLORIDE 1.25 MG: 1.25 SOLUTION, CONCENTRATE RESPIRATORY (INHALATION) at 19:38

## 2022-02-25 RX ADMIN — POLYETHYLENE GLYCOL 3350 17 G: 17 POWDER, FOR SOLUTION ORAL at 08:42

## 2022-02-25 RX ADMIN — IPRATROPIUM BROMIDE 0.5 MG: 0.5 SOLUTION RESPIRATORY (INHALATION) at 14:07

## 2022-02-25 RX ADMIN — TICAGRELOR 90 MG: 90 TABLET ORAL at 20:36

## 2022-02-25 RX ADMIN — INSULIN LISPRO 2 UNITS: 100 INJECTION, SOLUTION INTRAVENOUS; SUBCUTANEOUS at 06:01

## 2022-02-25 RX ADMIN — INSULIN LISPRO 2 UNITS: 100 INJECTION, SOLUTION INTRAVENOUS; SUBCUTANEOUS at 17:00

## 2022-02-25 RX ADMIN — FUROSEMIDE 40 MG: 10 INJECTION, SOLUTION INTRAMUSCULAR; INTRAVENOUS at 08:42

## 2022-02-25 RX ADMIN — SODIUM CHLORIDE SOLN NEBU 3% 4 ML: 3 NEBU SOLN at 19:38

## 2022-02-25 RX ADMIN — QUETIAPINE FUMARATE 25 MG: 25 TABLET ORAL at 21:07

## 2022-02-25 RX ADMIN — BUDESONIDE 0.5 MG: 0.5 INHALANT ORAL at 19:38

## 2022-02-25 RX ADMIN — METOPROLOL TARTRATE 25 MG: 25 TABLET, FILM COATED ORAL at 20:35

## 2022-02-25 RX ADMIN — INSULIN LISPRO 1 UNITS: 100 INJECTION, SOLUTION INTRAVENOUS; SUBCUTANEOUS at 21:09

## 2022-02-25 RX ADMIN — LOSARTAN POTASSIUM 50 MG: 50 TABLET, FILM COATED ORAL at 08:41

## 2022-02-25 RX ADMIN — POTASSIUM CHLORIDE 20 MEQ: 20 SOLUTION ORAL at 12:09

## 2022-02-25 RX ADMIN — ATORVASTATIN CALCIUM 40 MG: 40 TABLET, FILM COATED ORAL at 08:42

## 2022-02-25 RX ADMIN — AMIODARONE HYDROCHLORIDE 200 MG: 200 TABLET ORAL at 08:42

## 2022-02-25 NOTE — QUICK NOTE
Son Jn Dillon updated via phone  We discussed patient's presentation with on heart failure exacerbation and pulmonary edema, improved with IV Lasix and stable trach collar O2 requirements  All questions have been answered to his satisfaction at this time

## 2022-02-25 NOTE — ASSESSMENT & PLAN NOTE
· Home medications include cozaar, furosemide  Also on metoprolol for afib  · [address] Cardiology following   Increased Cozaar yesterday to 50mg PO daily  · BP currently controlled  · Goal SBP <160

## 2022-02-25 NOTE — PROGRESS NOTES
[facility] 46  Progress Note - Michelle Roa 1965, 55 y o  female MRN: [id number]  Unit/Bed#: ICU 12 Encounter: [id number]  Primary Care Provider: Trish Brito MD   Date and time admitted to hospital: 2/24/2022 12:26 AM    * Acute hypoxemic respiratory failure [facility])  Assessment & Plan  · Hx of respiratory arrest last month, admitted to [facility] 25  S/p T&P  On 10L FiO2 via trach collar @ baseline  · Presentated to ED 2/22 for SOB, thought 2/2 anxiety +/- CHF exacerbation  Was given lasix, nebs, lorazepam and symptoms improved  Was recommended for admission, but requested to leave  · Presented again to [facility] ED 2/23 via her son with c/o acute onset SOB  · SpO2 89-92% on arrival to ED  Placed on PS 10/6 and 50%  SpO2 and WOB improved to 100%  · NT-proBNP 463, history of heart failure EF 40%  Reports recent increase in peripheral edema  Reports compliance with lasix 40mg BID  · D-dimer 2 55  Is on Eliquis OP for history of Afib  · CTA chest 2/23: "No PE  Mild diffuse ground glass opacities in the lungs may be due to pulmonary edema  Small bilateral pleural effusions  Cardiomegaly  Correlate for heart failure  Patchy opacities at the bilateral lower lobe bases and within the posterior left upper lobe which may be due to atelectasis and/or pneumonia"  · S/p furosemide 40mg x1 in ED  Continues on furosemide 40mg IV BID  Net fluid balance -2 3L at time of note  · Respiratory failure likely 2/2 heart failure, see plan below  · Afebrile  No leukocytosis  Doubt pulmonary infection  Flu/COVID negative  · Recently treated w/10 days of doxycycline for R  Lung bulla vs  Pneumatocele  Monitor off of antibiotics  · MRSA swab and BC's pending   · Procal negative, remains afebrile   Trend temperature curve and CBC  · Wean FiO2 to goal SpO2 >90%  · Has been stable on trach collar since shortly after admission    Heart failure [facility])  Assessment & Plan  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 in January of this year 2/2 infected R  lung bullae  S/p Linezolid then doxycycline 4 week course  · Last ECHO 10/30/21: EF 40%, akinesis of the entire inferior and anterolateral portion of the LV  Wall thickness is moderately increased  · Home medications include metoprolol 25mg PO BID and furosemide 40mg PO BID  Is also on Losartan 25mg PO daily   · CTA chest 2/23: b/l pleural effusions and b/l opacities possibly 2/2 pulmonary edema  · Reports increasing lower extremity edema  Unclear compliance with low sodium diet  Will discuss with son today  · S/p extra dose of lasix 40mg IV in ED, diuresed 650ml and 300ml urine  Net neg 2 3L this stay  · ECHO 2/24: EF 50%  Moderate hypokinesis of the inferior and the basal anterolateral walls  Trivial effusion, no evidence of tamponade  · Daily weights  · Low sodium diet  · Close I&O monitoring  · Appreciate Cardiology following        CAD (coronary artery disease)  Assessment & Plan  · Admitted for STEMI SLB 10/22-11/23/21  · S/p cardiac cath "mid LCX culprit lesion at bifurcation of the OM2 both vessels were small in caliber, t/w 2 5 x 28 mm Xience PATRICA, OM2 was ballooned but not stented; mild nonobstructive disease in LAD and RCA"  · On daily Eliquis, ticagrelor, statin  · EKG NSR with R  BBB  No ST changes  · Troponin negative x2  No further trending  · Continuous cardiac monitoring  Life vest removed on admission  A-fib [facility])  Assessment & Plan  · History of afib on Eliquis  · Currently in NSR with R  BBB  · Continuous cardiac monitoring  · Continue Eliquis    History of Ventricular tachycardia (HCC)  Assessment & Plan  · Significant cardiac history with multiple recent hospitalizations   Had prolonged hospitalization at [facility] secondary to STEMI 10/22/21 s/p PATRICA to mid LCx complicated by cardiogenic shock and cardiac arrest secondary to VT x 2  Was d/c to rehab with T&P  · Presented to [facility] on 1/1/2022 from [place] [id number] with cardiac arrest  She had ROSC pre hospital after AED shock x1  Seen by cardiology and did not feel arrest was ischemia-driven  Recommended ICD placement for secondary prevention  Patient's intervention deferred in setting of infected R  Lung bullae  Now s/p treatment x10D with doxycycline  · Discharged from [facility] on [facility] 2/10 with plan for elective ICD at 4 weeks  Denies any shocks delivered since vest initiation  · Records show Amiodarone 200mg PO BID starting 11/23  · Continue amio 200mg PO daily   · Continuous cardiac monitoring  · Goal K>4, Mg>2      Uncontrolled type 2 diabetes mellitus with complication, with long-term current use of insulin [facility])  Assessment & Plan  Lab Results   Component Value Date    HGBA1C 6 6 (H) 01/01/2022       Recent Labs     02/24/22  1131 02/24/22  1638 02/24/22  1709 02/24/22  2331   POCGLU 181* 147* 143* 186*       Blood Sugar Average: Last 72 hrs:  · (P) 173 8   · Discharged from inpatient rehab on Basaglar 8 units daily and Apidra 2 units tid   · Hold long acting insulin for now, BG's controlled on SSI  · ACHS BG monitoring with SSI   · Goal -180    History of Cardiac arrest [facility])  Assessment & Plan  · See plan for Ventricular tachycardia above      Hyperlipidemia associated with type 2 diabetes mellitus ([address] )  Assessment & Plan  Lab Results   Component Value Date    HGBA1C 6 6 (H) 01/01/2022       · Continue atorvastatin  · DM treatment per above plan   (P) 173 8    Hypertension  Assessment & Plan  · Home medications include cozaar, furosemide  Also on metoprolol for afib  · [address] Cardiology following   Increased Cozaar yesterday to 50mg PO daily  · BP currently controlled  · Goal SBP <160    Subglottic stenosis  Assessment & Plan  · Had difficulty with placement of ETT with prior respiratory arrest 1/14/22  · Now s/p tracheostomy #6 cuffed shiley   · Seen by speech yesterday, unable to utilize passy willard valve in setting of stenosis    Anxiety  Assessment & Plan  · Severely anxious when dyspneic on arrival to ED and ICU  · Takes lorazepam 0 5mg PO BID OP  · Anxiety much improved after transition to trach collar  · Will continue PRN lorazepam       ----------------------------------------------------------------------------------------  HPI/24hr events: Stable on TC since admission, currently on 35% 10L  Did c/o some SOB last evening, but also reported anxiety  No increased WOB at that time and SpO2 was 100%  Symptoms resolved after administration of neb treatment and PRN lorazepam  Slept well overnight  Offers no other complaints  Patient appropriate for transfer out of the ICU today?: Patient does not meet criteria for ICU Follow-up Clinic; referral has not been made  Disposition: Transfer to Med-Surg   Code Status: Level 1 - Full Code  ---------------------------------------------------------------------------------------  SUBJECTIVE  'I'm doing better"     Review of Systems   Unable to perform ROS: Other (limited in setting of trach)   HENT: Negative  Respiratory: Positive for shortness of breath ("maybe I am a little anxious")  Cardiovascular: Positive for leg swelling (reports improvement)  Negative for chest pain and palpitations  Gastrointestinal: Negative  Skin:        Pain in lower legs where skin is dry/red/cracked  Psychiatric/Behavioral: The patient is nervous/anxious        Review of systems was unable to be performed secondary to trach  ---------------------------------------------------------------------------------------  OBJECTIVE    Vitals   Vitals:    02/25/22 0200 02/25/22 0300 02/25/22 0358 02/25/22 0400   BP: 140/74 126/63  154/78   Pulse: 64 63  69   Resp: (!) 25 (!) 26  (!) 26   Temp: TempSrc:       SpO2: 100% 98% 98% 95%   Weight:       Height:         Temp (24hrs), Av 1 °F (36 2 °C), Min:96 8 °F (36 °C), Max:97 6 °F (36 4 °C)  Current: Temperature: (!) 96 9 °F (36 1 °C)          Respiratory:  SpO2: SpO2: 95 %, SpO2 Activity: SpO2 Activity: At Rest, SpO2 Device: O2 Device: Trach mask       Invasive/non-invasive ventilation settings   Respiratory  Report   Lab Data (Last 4 hours)    None         O2/Vent Data (Last 4 hours)    None                Physical Exam  Vitals and nursing note reviewed  Constitutional:       General: She is not in acute distress  Appearance: She is ill-appearing and toxic-appearing  HENT:      Head: Normocephalic  Mouth/Throat:      Mouth: Mucous membranes are dry  Eyes:      General: Lids are normal       Pupils: Pupils are equal, round, and reactive to light  Neck:      Comments: #6 cuffed shiley in place, scant serosang drainage at site  Cardiovascular:      Rate and Rhythm: Normal rate and regular rhythm  Pulses:           Radial pulses are 2+ on the right side and 2+ on the left side  Dorsalis pedis pulses are 1+ on the right side and 1+ on the left side  Heart sounds: Normal heart sounds  Comments: Edema improved from yesterday  Now tolerating SCD's on lower legs  Pulmonary:      Effort: Pulmonary effort is normal  No respiratory distress  Breath sounds: Examination of the right-middle field reveals rales  Examination of the right-lower field reveals rales  Rales present  No wheezing or rhonchi  Abdominal:      General: Bowel sounds are normal  There is no distension  Palpations: Abdomen is soft  Tenderness: There is no abdominal tenderness  There is no guarding  Genitourinary:     Comments: purewick draining clear yellow urine in sufficient quantity  Musculoskeletal:      Cervical back: Neck supple  Right lower le+ Edema present  Left lower le+ Edema present     Skin:     General: Skin is warm and dry  Capillary Refill: Capillary refill takes less than 2 seconds  Coloration: Skin is sallow  Comments: Lower legs/ankles with cracked, dry skin    Neurological:      General: No focal deficit present  Mental Status: She is alert  GCS: GCS eye subscore is 4  GCS verbal subscore is 1  GCS motor subscore is 6  Comments: GCS 11T, appears oriented/interacting appropriately    Psychiatric:         Attention and Perception: Attention normal          Mood and Affect: Mood is anxious (mildly anxious)  Speech: She is noncommunicative (trach in place, unable to utilize [employer] but able to mouth words to make needs known)  Behavior: Behavior is cooperative               Laboratory and Diagnostics:  Results from last 7 days   Lab Units 02/24/22 0518 02/23/22 2038 02/21/22  1301   WBC Thousand/uL 8 60 9 86 9 27   HEMOGLOBIN g/dL 7 7* 10 4* 9 7*   HEMATOCRIT % 25 4* 34 0* 32 3*   PLATELETS Thousands/uL 288 392* 307   NEUTROS PCT % 71 69 75   MONOS PCT % 7 7 8     Results from last 7 days   Lab Units 02/24/22 0518 02/23/22 2038 02/21/22  1301   SODIUM mmol/L 144 145 143   POTASSIUM mmol/L 3 1* 3 3* 3 5   CHLORIDE mmol/L 111* 108 109*   CO2 mmol/L 25 24 22   ANION GAP mmol/L 8 13 12   BUN mg/dL 12 13 13   CREATININE mg/dL 0 81 0 91 0 72   CALCIUM mg/dL 7 9* 9 1 8 8   GLUCOSE RANDOM mg/dL 197* 205* 199*   ALT U/L 18 13 10   AST U/L 12 14* 10*   ALK PHOS U/L 83 92 1 86 5   ALBUMIN g/dL 2 6* 3 9 3 6   TOTAL BILIRUBIN mg/dL 0 54 0 69 0 87     Results from last 7 days   Lab Units 02/24/22 0518 02/23/22 2038   MAGNESIUM mg/dL 1 9 1 5*   PHOSPHORUS mg/dL 4 1  --       Results from last 7 days   Lab Units 02/23/22 2038   INR  1 32*   PTT seconds 25          Results from last 7 days   Lab Units 02/23/22 2220 02/23/22 2038   LACTIC ACID mmol/L 1 6 2 4*     ABG:    VBG:  Results from last 7 days   Lab Units 02/23/22 2038   PH FRANCO  7 367   PCO2 FRANCO mm Hg 43 1   PO2 FRANCO mm Hg 35 0   HCO3 FRANCO mmol/L 24 2   BASE EXC FRANCO mmol/L -1 2     Results from last 7 days   Lab Units 02/24/22  0518   PROCALCITONIN ng/ml 0 10       Micro  Results from last 7 days   Lab Units 02/24/22  0521 02/24/22  0519 02/23/22 2038   BLOOD CULTURE  Received in Microbiology Lab  Culture in Progress  Received in Microbiology Lab  Culture in Progress  No Growth at 24 hrs  No Growth at 24 hrs  EKG: NSR on monitor, rate 60's  Imaging: no new imaging    Intake and Output  I/O       02/23 0701  02/24 0700 02/24 0701 02/25 0700    IV Piggyback  150    Total Intake(mL/kg)  150 (1 6)    Urine (mL/kg/hr) 300 2150 (1)    Total Output 300 2150    Net -300 -2000          Unmeasured Urine Occurrence  2 x          Height and Weights   Height: 5' 10" (177 8 cm)     Body mass index is 28 84 kg/m²  Weight (last 2 days)     Date/Time Weight    02/24/22 0837 91 2 (201)    02/24/22 0600 91 4 (201 5)    02/24/22 0100 91 2 (201 06)            Nutrition       Diet Orders   (From admission, onward)             Start     Ordered    02/25/22 0349  Diet Cardiovascular; Sodium 2 GM; Consistent Carbohydrate Diet Level 2 (5 carb servings/75 grams CHO/meal)  Diet effective now        References:    Nutrtion Support Algorithm Enteral vs  Parenteral   Question Answer Comment   Diet Type Cardiovascular    Cardiac Sodium 2 GM    Other Restriction(s): Consistent Carbohydrate Diet Level 2 (5 carb servings/75 grams CHO/meal)    RD to adjust diet per protocol?  Yes        02/25/22 0348    02/24/22 1100  Room Service  Once        Question:  Type of Service  Answer:  Room Service-Appropriate    02/24/22 1059                  Active Medications  Scheduled Meds:  Current Facility-Administered Medications   Medication Dose Route Frequency Provider Last Rate    acetaminophen  975 mg Per G Tube Q6H PRN NEELAM Caicedo      albuterol  2 5 mg Nebulization Q4H PRN NEELAM Caicedo      amiodarone  200 mg Oral Daily With Breakfast Myla CHINO Ival Patch, CRNP      apixaban  5 mg Oral BID Merla Siskin, CRNP      atorvastatin  40 mg Oral Daily Merla Siskin, CRNP      budesonide  0 5 mg Nebulization Q12H Roz Wright V, CRNP      chlorhexidine  15 mL Mouth/Throat Q12H [address] Merla Siskin, CRNP      famotidine  20 mg Oral BID Merla Siskin, CRNP      furosemide  40 mg Intravenous BID (diuretic) Merla Siskin, CRNP      insulin lispro  1-6 Units Subcutaneous HS Merla Siskin, CRNP      insulin lispro  2-12 Units Subcutaneous TID AC Merla Siskin, CRNP      ipratropium  0 5 mg Nebulization TID Merla Siskin, CRNP      levalbuterol  1 25 mg Nebulization TID Merla Siskin, CRNP      LORazepam  0 5 mg Oral BID PRN Merla Siskin, CRNP      losartan  50 mg Oral Daily Mechele Apo, CRNP      melatonin  6 mg Oral HS Merla Siskin, CRNP      metoprolol tartrate  25 mg Oral Q12H [address] Merla Siskin, CRNP      ondansetron  4 mg Intravenous Q6H PRN Merla Siskin, CRNP      polyethylene glycol  17 g Oral Daily Merla Siskin, CRNP      pregabalin  75 mg Oral Daily Merla Siskin, CRNP      QUEtiapine  25 mg Oral HS Merla Siskin, CRNP      senna-docusate sodium  1 tablet Oral HS Merla Siskin, CRNP      ticagrelor  90 mg Oral Q12H [address] Merla Siskin, CRNP       Continuous Infusions:     PRN Meds:   acetaminophen, 975 mg, Q6H PRN  albuterol, 2 5 mg, Q4H PRN  LORazepam, 0 5 mg, BID PRN  ondansetron, 4 mg, Q6H PRN        Invasive Devices Review  Invasive Devices  Report    Peripheral Intravenous Line            Peripheral IV 02/23/22 Right Antecubital 1 day    Peripheral IV 02/23/22 Right Wrist 1 day          Drain            External Urinary Catheter <1 day          Airway            Surgical Airway Citlalliley Cuffed 104 days                Rationale for remaining devices: Critical illness   Carter Ruth maintained due to subglottal stenosis  ---------------------------------------------------------------------------------------  Advance Directive and Living Will:      Power of :    POLST:    ---------------------------------------------------------------------------------------  Care Time Delivered:   No Critical Care time spent       [facility]      Portions of the record may have been created with voice recognition software  Occasional wrong word or "sound a like" substitutions may have occurred due to the inherent limitations of voice recognition software    Read the chart carefully and recognize, using context, where substitutions have occurred

## 2022-02-25 NOTE — PROGRESS NOTES
Progress Note - Cardiology   [facility]     Pete Cervantes 55 y o  female MRN: [id number]  : 1965  Unit/Bed#: ICU 12 Encounter: [id number]    Assessment and Plan:   1  Acute hypoxic respiratory failure:  Chronic tracheostomy    -  PA pressures 55 mm Hg    2  Acute on chronic diastolic heart failure:  EF noted to be 50% with moderate concentric hypertrophy    -  continue Lopressor 25 mg b i d     -  continue Cozaar 50 mg once a day and adjust with goal blood pressure systolic less than 767 mmHg    -  continue Lasix 40 mg IV b i d     -  close monitoring of I&O, daily weights and electrolytes    -  low-sodium diet    3  Coronary artery disease with recent STEMI:  Patient with history of PCI with stent to mid circ with jailing of LM    -  continue Brilinta, beta-blocker and statin therapy    4  History of VT arrest:  Currently wearing life vest at home  -  ICD implant previously held due to infected lung bulla    -  recently treated with 10 days of doxycycline therapy    -  will need ID clearance prior to ICD implant    5  Paroxysmal atrial fibrillation:  Maintaining sinus rhythm     -  continue amiodarone and beta-blocker    -  continue Eliquis    6  Diabetes:  Managed per primary team    7  Hypertension:  Cozaar increased to 50 mg daily on admission  Will continue monitor vital signs and adjust as needed    8  Anxiety:  On Seroquel     9  Subglottic stenosis with history of trach    Subjective / Objective:   Patient seen and examined  Still notes intermittent bouts of shortness of breath, but noted O2 sats are maintaining at 90  Question component of anxiety  Lungs clear and she denies any chest discomfort  Vitals: Blood pressure 145/80, pulse 74, temperature 98 °F (36 7 °C), resp  rate 22, height 5' 10" (1 778 m), weight 91 2 kg (201 lb), SpO2 100 %    Vitals:    22 0600 22 0837   Weight: 91 4 kg (201 lb 8 oz) 91 2 kg (201 lb)     Body mass index is 28 84 kg/m²   BP Readings from Last 3 Encounters:   22 145/80   22 149/82   22 155/75     Orthostatic Blood Pressures      Most Recent Value   Blood Pressure 145/80 filed at 2022 0900        I/O        0701   0700  0701   0700  0701   0700    IV Piggyback  150     Total Intake(mL/kg)  150 (1 6)     Urine (mL/kg/hr) 300 2550 (1 2)     Total Output 300 2550     Net -300 -2400            Unmeasured Urine Occurrence  2 x         Invasive Devices  Report    Peripheral Intravenous Line            Peripheral IV 22 Right Antecubital 1 day    Peripheral IV 22 Right Wrist 1 day          Drain            External Urinary Catheter <1 day          Airway            Surgical Airway <1 day                  Intake/Output Summary (Last 24 hours) at 2022 1021  Last data filed at 2022 0501  Gross per 24 hour   Intake --   Output 2300 ml   Net -2300 ml         Physical Exam:   Physical Exam  Vitals and nursing note reviewed  Constitutional:       General: She is not in acute distress  Appearance: Normal appearance  She is obese  HENT:      Right Ear: External ear normal       Left Ear: External ear normal    Eyes:      General: No scleral icterus  Right eye: No discharge  Left eye: No discharge  Cardiovascular:      Rate and Rhythm: Normal rate and regular rhythm  Pulses: Normal pulses  Heart sounds: Normal heart sounds  Pulmonary:      Effort: Pulmonary effort is normal  No respiratory distress  Breath sounds: Normal breath sounds  Abdominal:      General: Bowel sounds are normal  There is no distension  Palpations: Abdomen is soft  Musculoskeletal:      Right lower le+ Pitting Edema present  Left lower le+ Pitting Edema present  Skin:     General: Skin is warm and dry  Capillary Refill: Capillary refill takes less than 2 seconds  Neurological:      General: No focal deficit present  Mental Status: She is alert and oriented to person, place, and time  Mental status is at baseline                     Medications/ Allergies:     Current Facility-Administered Medications   Medication Dose Route Frequency Provider Last Rate    acetaminophen  975 mg Per G Tube Q6H PRN Marvin Sleight, CRNP      albuterol  2 5 mg Nebulization Q4H PRN Marvin Sleight, CRNP      amiodarone  200 mg Oral Daily With Breakfast Marvin Sleight, CRNP      apixaban  5 mg Oral BID Marvin Sleight, CRNP      atorvastatin  40 mg Oral Daily Marvin Sleight, CRNP      budesonide  0 5 mg Nebulization Q12H Roz Spironello V, CRNP      chlorhexidine  15 mL Mouth/Throat Q12H [address] Marvin Sleight, CRNP      famotidine  20 mg Oral BID Marvin Sleight, CRNP      furosemide  40 mg Intravenous BID (diuretic) Marvin Sleight, CRNP      insulin lispro  1-6 Units Subcutaneous HS Marvin Sleight, CRNP      insulin lispro  2-12 Units Subcutaneous TID AC Marvin Sleight, CRNP      ipratropium  0 5 mg Nebulization TID Marvin Sleight, CRNP      levalbuterol  1 25 mg Nebulization TID Marvin Sleight, CRNP      LORazepam  0 5 mg Oral BID PRN Marvin Sleight, CRNP      losartan  50 mg Oral Daily Danyelle Patrick, CRNP      melatonin  6 mg Oral HS Marvin Sleight, CRNP      metoprolol tartrate  25 mg Oral Q12H [address] Marvin Sleight, CRNP      ondansetron  4 mg Intravenous Q6H PRN Marvin Sleight, CRNP      polyethylene glycol  17 g Oral Daily Marvin Sleight, CRNP      pregabalin  75 mg Oral Daily Marvin Sleight, CRNP      QUEtiapine  25 mg Oral HS Marvin Sleight, CRNP      senna-docusate sodium  1 tablet Oral HS Marvin Sleight, CRNP      sodium chloride  4 mL Nebulization Q6H Roz Wright V, CRNP      ticagrelor  90 mg Oral Q12H [address] Marvin Sleight, CRNP       acetaminophen, 975 mg, Q6H PRN  albuterol, 2 5 mg, Q4H PRN  LORazepam, 0 5 mg, BID PRN  ondansetron, 4 mg, Q6H PRN      Allergies   Allergen Reactions    Metformin Diarrhea    Clonidine Rash     Patch        VTE Pharmacologic Prophylaxis:   Sequential compression device (Venodyne)     Labs:   Troponins:  Results from last 7 days   Lab Units 02/23/22 2220   HSTNI D2 ng/L -2     CBC with diff:  Results from last 7 days   Lab Units 02/25/22  0551 02/24/22  0518 02/23/22 2038 02/21/22  1301   WBC Thousand/uL 8 09 8 60 9 86 9 27   HEMOGLOBIN g/dL 7 9* 7 7* 10 4* 9 7*   HEMATOCRIT % 27 0* 25 4* 34 0* 32 3*   MCV fL 85 85 83 84   PLATELETS Thousands/uL 287 288 392* 307   MCH pg 24 9* 25 8* 25 5* 25 2*   MCHC g/dL 29 3* 30 3* 30 6* 30 0*   RDW % 19 3* 18 7* 18 7* 18 6*   MPV fL 11 0 11 2 11 6 11 3   NRBC AUTO /100 WBCs  --  0 0 0     CMP:  Results from last 7 days   Lab Units 02/25/22  0551 02/24/22 0518 02/23/22 2038 02/21/22  1301   SODIUM mmol/L 145 144 145 143   POTASSIUM mmol/L 3 9 3 1* 3 3* 3 5   CHLORIDE mmol/L 110* 111* 108 109*   CO2 mmol/L 27 25 24 22   ANION GAP mmol/L 8 8 13 12   BUN mg/dL 14 12 13 13   CREATININE mg/dL 0 90 0 81 0 91 0 72   CALCIUM mg/dL 8 2* 7 9* 9 1 8 8   AST U/L  --  12 14* 10*   ALT U/L  --  18 13 10   ALK PHOS U/L  --  83 92 1 86 5   TOTAL PROTEIN g/dL  --  6 0* 7 6 7 0   ALBUMIN g/dL  --  2 6* 3 9 3 6   TOTAL BILIRUBIN mg/dL  --  0 54 0 69 0 87   EGFR ml/min/1 73sq m 72 82 71 94     Magnesium:  Results from last 7 days   Lab Units 02/25/22  0551 02/24/22 0518 02/23/22 2038   MAGNESIUM mg/dL 2 3 1 9 1 5*     Coags:  Results from last 7 days   Lab Units 02/23/22 2038   PTT seconds 25   INR  1 32*       Imaging & Testing   I have personally reviewed pertinent reports  XR chest 1 view portable    Result Date: 2/24/2022  Narrative: CHEST INDICATION:   sob  COMPARISON:  Chest radiograph 2/21/2022  EXAM PERFORMED/VIEWS:  XR CHEST PORTABLE FINDINGS:  Lesser extent tracheostomy unchanged  Heart shadow is enlarged but unchanged from prior exam  Stable mild pulmonary edema  Stable small left pleural effusion  No pneumothorax   Osseous structures appear within normal limits for patient age  Impression: Stable mild pulmonary edema and small left pleural effusion  Workstation performed: [id number]     XR chest portable    Result Date: 2/21/2022  Narrative: CHEST INDICATION:   SOB, trach  COMPARISON:  Most recent is chest x-ray dated February 12, 2022  EXAM PERFORMED/VIEWS:  XR CHEST PORTABLE FINDINGS: Cardiomediastinal silhouette appears unremarkable  Moderate pulmonary edema, similar to the prior study  Life best and tracheostomy unchanged  Probable bilateral left greater than right pleural effusions  Osseous structures appear within normal limits for patient age  Impression: No acute cardiopulmonary disease  Workstation performed: [id number]       CTA ED chest PE Study    Result Date: 2/23/2022  Narrative: CTA - CHEST WITH IV CONTRAST - PULMONARY ANGIOGRAM INDICATION:   Shortness of breath, + dimer, trach, CHF  COMPARISON: CT of the chest abdomen pelvis on November 14, 2021  TECHNIQUE: CTA examination of the chest was performed using angiographic technique according to a protocol specifically tailored to evaluate for pulmonary embolism  Axial, sagittal, and coronal 2D reformatted images were created from the source data and  submitted for interpretation  In addition, coronal 3D MIP postprocessing was performed on the acquisition scanner  Radiation dose length product (DLP) for this visit:  468 6 mGy-cm   This examination, like all CT scans performed in the [facility], was performed utilizing techniques to minimize radiation dose exposure, including the use of iterative reconstruction and automated exposure control  IV Contrast:  100 mL of iohexol (OMNIPAQUE)  FINDINGS: PULMONARY ARTERIAL TREE:  No pulmonary embolus is seen  LUNGS: Tracheostomy tube in place  Hypoventilatory changes of lungs  Mild diffuse groundglass opacity within the lungs may be due to pulmonary edema    There are patchy opacities at the bilateral lower lobe bases and within the posterior left upper lobe  which may be due to pneumonia in the appropriate clinical setting  PLEURA:  Small bilateral pleural effusions  HEART/GREAT VESSELS:  Cardiomegaly  No pericardial effusion  Coronary artery calcifications  No thoracic aortic aneurysm  MEDIASTINUM AND REECE:  Mediastinal lymph nodes measure up to 9 mm in short axis  CHEST WALL AND LOWER NECK:   Unremarkable  VISUALIZED STRUCTURES IN THE UPPER ABDOMEN:  Unremarkable  OSSEOUS STRUCTURES: No acute fracture  Redemonstrated old sternal and bilateral rib fractures  Impression: 1  No evidence of pulmonary embolism  2   Mild diffuse ground glass opacities in the lungs may be due to pulmonary edema  Small bilateral pleural effusions  Cardiomegaly  Correlate for heart failure  3   Patchy opacities at the bilateral lower lobe bases and within the posterior left upper lobe which may be due to atelectasis and/or pneumonia in the appropriate clinical setting  Workstation performed: [id number]     Echo complete w/ contrast if indicated    Result Date: 2/24/2022  Narrative: Yamileth Durand  Left Ventricle: Left ventricular cavity size is normal  Wall thickness is moderately increased  Systolic function is normal   Estimated ejection fraction is 50%  There is moderate hypokinesis of the inferior and the basal anterolateral walls  Diastolic function is normal  There is moderate concentric hypertrophy    Left Atrium: The atrium is mildly dilated    Right Atrium: The atrium is mildly dilated    Tricuspid Valve: There is mild regurgitation  The right ventricular systolic pressure is mildly to moderately elevated at 54 mm Hg    Aorta: Aortic root is mildly dilated  Consider CTA for more accurate evaluation    Pericardium: There is a trivial pericardial effusion  There is no echocardiographic evidence of tamponade  There is a left pleural effusion    Changes include: Ejection fraction has improved  RV systolic pressure is elevated          EKG / Monitor: Personally reviewed  Sinus rhythm        Vinny Jones, [facility]  Cardiology      "This note has been constructed using a voice recognition system  Therefore there may be syntax, spelling, and/or grammatical errors   Please call if you have any questions  "

## 2022-02-25 NOTE — ASSESSMENT & PLAN NOTE
Lab Results   Component Value Date    HGBA1C 6 6 (H) 01/01/2022       Recent Labs     02/24/22  1131 02/24/22  1638 02/24/22  1709 02/24/22  2331   POCGLU 181* 147* 143* 186*       Blood Sugar Average: Last 72 hrs:  · (P) 173 8   · Discharged from inpatient rehab on Basaglar 8 units daily and Apidra 2 units tid   · Hold long acting insulin for now, BG's controlled on SSI  · ACHS BG monitoring with SSI   · Goal -180

## 2022-02-25 NOTE — ASSESSMENT & PLAN NOTE
· Hx of respiratory arrest last month, admitted to [facility] 25  S/p T&P  On 10L FiO2 via trach collar @ baseline  · Presentated to ED 2/22 for SOB, thought 2/2 anxiety +/- CHF exacerbation  Was given lasix, nebs, lorazepam and symptoms improved  Was recommended for admission, but requested to leave  · Presented again to [facility] ED 2/23 via her son with c/o acute onset SOB  · SpO2 89-92% on arrival to ED  Placed on PS 10/6 and 50%  SpO2 and WOB improved to 100%  · NT-proBNP 463, history of heart failure EF 40%  Reports recent increase in peripheral edema  Reports compliance with lasix 40mg BID  · D-dimer 2 55  Is on Eliquis OP for history of Afib  · CTA chest 2/23: "No PE  Mild diffuse ground glass opacities in the lungs may be due to pulmonary edema  Small bilateral pleural effusions  Cardiomegaly  Correlate for heart failure  Patchy opacities at the bilateral lower lobe bases and within the posterior left upper lobe which may be due to atelectasis and/or pneumonia"  · S/p furosemide 40mg x1 in ED  Continues on furosemide 40mg IV BID  Net fluid balance -2 3L at time of note  · Respiratory failure likely 2/2 heart failure, see plan below  · Afebrile  No leukocytosis  Doubt pulmonary infection  Flu/COVID negative  · Recently treated w/10 days of doxycycline for R  Lung bulla vs  Pneumatocele  Monitor off of antibiotics  · MRSA swab and BC's pending   · Procal negative, remains afebrile   Trend temperature curve and CBC  · Wean FiO2 to goal SpO2 >90%  · Has been stable on trach collar since shortly after admission

## 2022-02-25 NOTE — ASSESSMENT & PLAN NOTE
· Had difficulty with placement of ETT with prior respiratory arrest 1/14/22  · Now s/p tracheostomy #6 cuffed sepideh   · Seen by speech yesterday, unable to utilize passy willard valve in setting of stenosis

## 2022-02-25 NOTE — ASSESSMENT & PLAN NOTE
· Significant cardiac history with multiple recent hospitalizations  Had prolonged hospitalization at [facility] secondary to STEMI 10/22/21 s/p PATRICA to mid LCx complicated by cardiogenic shock and cardiac arrest secondary to VT x 2  Was d/c to rehab with T&P  · Presented to [facility] on 1/1/2022 from [place] [id number] with cardiac arrest  She had ROSC pre hospital after AED shock x1  Seen by cardiology and did not feel arrest was ischemia-driven  Recommended ICD placement for secondary prevention  Patient's intervention deferred in setting of infected R  Lung bullae  Now s/p treatment x10D with doxycycline  · Discharged from [facility] on [facility] 2/10 with plan for elective ICD at 4 weeks  Denies any shocks delivered since vest initiation  · Records show Amiodarone 200mg PO BID starting 11/23     · Continue amio 200mg PO daily   · Continuous cardiac monitoring  · Goal K>4, Mg>2

## 2022-02-25 NOTE — PHYSICAL THERAPY NOTE
PHYSICAL THERAPY EVALUATION/TREATMENT     02/25/22 9574   Note Type   Note type Evaluation   Pain Assessment   Pain Assessment Tool 0-10   Pain Score No Pain   Restrictions/Precautions   Other Precautions Chair Alarm; Bed Alarm;Multiple lines;O2;Fall Risk  (trach with O2 collar)   Home Living   Type of [address] [place] [address] Multi-level;Stairs to enter with rails  (3 stairs to enter)   [address]; Wheelchair-manual   Additional Comments patient using roller walker prior to admission with home O2 and sleeping in a recliner on the first floor of her home with commode use due to bathroom on upper level   Prior Function   Lives With Son  (and son's girlfriend)   Receives Help From Family;Home health  (nephew also assists in home, home PT and OT as well )   ADL Assistance Needs assistance   IADLs Needs assistance   Comments patient with extensive medical history and home from [facility] for several weeks with HOME PT/OT and family assist   General   Additional Pertinent History chart reviewed, patient admitted with acute hypoxemic respiratory failure   patient with trach in place prior to admission and patient wanting to return home with services   Family/Caregiver Present No   Cognition   Overall Cognitive Status WFL   Arousal/Participation Cooperative   Attention Within functional limits   Orientation Level Oriented X4   Following Commands Follows all commands and directions without difficulty   Subjective   Subjective patient without pain, nonverbal with trach but able to communicate by therapist lip reading patient   RLE Assessment   RLE Assessment   (ROm WFL,  strength 3+/4-)   LLE Assessment   LLE Assessment   (ROm WFL, strength 3+/4-)   Coordination   Movements are Fluid and Coordinated 0   Bed Mobility   Additional Comments patient sitting out of bed in bedside chair upon arrival by therapist   Transfers   Sit to Stand 7  Independent   Stand to Sit 5 Supervision  (supervision due to multiple lines in ICU)   Ambulation/Elevation   Gait Assistance 4  Minimal assist   Additional items Assist x 1   Assistive Device Rolling walker   Distance 20 feet with numerous changes in direction due to constraints of ICU lines   Balance   Static Sitting Good   Dynamic Sitting Fair +   Static Standing Fair   Dynamic Standing Fair   Ambulatory Fair   Activity Tolerance   Activity Tolerance Patient limited by fatigue  (limited by respiratory symptoms/SOB)   Nurse Made Aware yes   Assessment   Prognosis Good   Problem List Decreased strength;Decreased range of motion;Decreased endurance; Impaired balance;Decreased mobility; Decreased coordination   Assessment Patient seen for Physical Therapy evaluation  Patient admitted with Acute hypoxemic respiratory failure ([facility] [address] )  Comorbidities affecting patient's physical performance include: Anxiety, Aortic aneurysm (HCC),CAD, afib, Arthritis, Depression, Diabetes mellitus ([facility] [address] ), Fibromyalgia, GERD (gastroesophageal reflux disease), GERD, Hyperlipidemia, Hypertension, Migraines, Psychiatric disorder, cardiac arrest and VT on lifevest  She has had multiple hospitalizations since the end of last year after experiencing a STEMI in October  Had a prolonged hospitalization 35/44-83/91/50 complicated by cardiogenic shock, VT arrest x2, and sepsis  Required T&P at that time and was d/c to [facility], [facility]  She then experienced cardiac arrest 1/1/22 while at [facility], [facility], had ROSC pre hospital after AED shock x1   Personal factors affecting patient at time of initial evaluation include: lives in multi story house, inability to navigate community distances, inability to navigate level surfaces without external assistance, inability to perform dynamic tasks in community, inability to perform physical activity and inability to perform IADLS    Prior to admission, patient was requiring assist for functional mobility with walker, requiring assist for ADLS, requiring assist for IADLS, living with family in a three level home with 3 steps to enter and home with family assist   Please find objective findings from Physical Therapy assessment regarding body systems outlined above with impairments and limitations including weakness, decreased ROM, impaired balance, decreased endurance, impaired coordination, gait deviations, decreased activity tolerance, decreased functional mobility tolerance, fall risk and SOB upon exertion  The Barthel Index was used as a functional outcome tool presenting with a score of Barthel Index Score: 55 today indicating marked limitations of functional mobility and ADLS  Patient's clinical presentation is currently unstable/unpredictable as seen in patient's presentation of vital sign response, increased fall risk, new onset of impairment of functional mobility, decreased endurance and new onset of weakness  Pt would benefit from continued Physical Therapy treatment to address deficits as defined above and maximize level of functional mobility  As demonstrated by objective findings, the assigned level of complexity for this evaluation is high  The patient's [facility] Basic Mobility Inpatient Short Form Raw Score is 20  A Raw score of greater than 16 suggests the patient may benefit   Goals   Patient Goals to go home and continue with PT   STG Expiration Date 03/04/22   Short Term Goal #1 transfers and gait with roller walker independently   Short Term Goal #2 gait endurance to functional household distances   LTG Expiration Date 03/11/22   Long Term Goal #1 strength BLEs 4/5   Long Term Goal #2 gait and transfers with cane independently   Plan   Treatment/Interventions ADL retraining;Functional transfer training;LE strengthening/ROM; Therapeutic exercise;Elevations; Endurance training;Patient/family training;Equipment eval/education;Gait training; Compensatory technique education; Bed mobility   PT Frequency Other (Comment)  (5w)   [facility] Basic Mobility Inpatient Turning in Bed Without Bedrails 4   Lying on Back to Sitting on Edge of Flat Bed 4   Moving Bed to Chair 3   Standing Up From Chair 4   Walk in Room 3   Climb 3-5 Stairs 2   Basic Mobility Inpatient Raw Score 20   Basic Mobility Standardized Score 43 99   Highest Level Of Mobility   JH-HLM Goal 6: Walk 10 steps or more   JH-HLM Highest Level of Mobility 6: Walk 10 steps or more   JH-HLM Goal Achieved Yes   Barthel Index   Feeding 10   Bathing 0   Grooming Score 5   Dressing Score 5   Bladder Score 5   Bowels Score 10   Toilet Use Score 5   Transfers (Bed/Chair) Score 10   Mobility (Level Surface) Score 0   Stairs Score 5   Barthel Index Score 55   Additional Treatment Session   Start Time 1440   End Time 1455   Treatment Assessment S: patient without pain O: BLE exercise completed sitting in chair, standing balance activity completed as well A: patient will benefit from continued PT with progression as tolerated   Exercises   Hip Flexion Sitting;10 reps;Bilateral   Knee AROM Short Arc Quad Sitting;10 reps;Bilateral   Knee AROM Long Arc Quad Sitting;10 reps;Bilateral   Ankle Pumps Sitting;10 reps;Bilateral   Balance training  sidestepping and backward walking completed   Licensure   [place] License Number  Atilio Iqbal PT [id number]      02/25/22 3446   Note Type   Note type Evaluation   Pain Assessment   Pain Assessment Tool 0-10   Pain Score No Pain   Restrictions/Precautions   Other Precautions Chair Alarm; Bed Alarm;Multiple lines;O2;Fall Risk  (trach with O2 collar)   Home Living   Type of [address] [place] [address] Multi-level;Stairs to enter with rails  (3 stairs to enter)   [address]; Wheelchair-manual   Additional Comments patient using roller walker prior to admission with home O2 and sleeping in a recliner on the first floor of her home with commode use due to bathroom on upper level   Prior Function   Lives With Son  (and son's girlfriend)   Receives Help From Family;Home health  (nephew also assists in home, home PT and OT as well )   ADL Assistance Needs assistance   IADLs Needs assistance   Comments patient with extensive medical history and home from [facility] for several weeks with HOME PT/OT and family assist   General   Additional Pertinent History chart reviewed, patient admitted with acute hypoxemic respiratory failure  patient with trach in place prior to admission and patient wanting to return home with services   Family/Caregiver Present No   Cognition   Overall Cognitive Status WFL   Arousal/Participation Cooperative   Attention Within functional limits   Orientation Level Oriented X4   Following Commands Follows all commands and directions without difficulty   Subjective   Subjective patient without pain, nonverbal with trach but able to communicate by therapist lip reading patient   RLE Assessment   RLE Assessment   (ROm WFL,  strength 3+/4-)   LLE Assessment   LLE Assessment   (ROm WFL, strength 3+/4-)   Coordination   Movements are Fluid and Coordinated 0   Bed Mobility   Additional Comments patient sitting out of bed in bedside chair upon arrival by therapist   Transfers   Sit to Stand 7  Independent   Stand to Sit 5  Supervision  (supervision due to multiple lines in ICU)   Ambulation/Elevation   Gait Assistance 4  Minimal assist   Additional items Assist x 1   Assistive Device Rolling walker   Distance 20 feet with numerous changes in direction due to constraints of ICU lines   Balance   Static Sitting Good   Dynamic Sitting Fair +   Static Standing Fair   Dynamic Standing Fair   Ambulatory Fair   Activity Tolerance   Activity Tolerance Patient limited by fatigue  (limited by respiratory symptoms/SOB)   Nurse Made Aware yes   Assessment   Prognosis Good   Problem List Decreased strength;Decreased range of motion;Decreased endurance; Impaired balance;Decreased mobility; Decreased coordination   Assessment Patient seen for Physical Therapy evaluation   Patient admitted with Acute hypoxemic respiratory failure ([facility] [address] )  Comorbidities affecting patient's physical performance include: Anxiety, Aortic aneurysm (HCC),CAD, afib, Arthritis, Depression, Diabetes mellitus ([facility] [address] ), Fibromyalgia, GERD (gastroesophageal reflux disease), GERD, Hyperlipidemia, Hypertension, Migraines, Psychiatric disorder, cardiac arrest and VT on lifevest  She has had multiple hospitalizations since the end of last year after experiencing a STEMI in October  Had a prolonged hospitalization 68/92-25/41/42 complicated by cardiogenic shock, VT arrest x2, and sepsis  Required T&P at that time and was d/c to [facility], [facility]  She then experienced cardiac arrest 1/1/22 while at [facility], had ROSC pre hospital after AED shock x1   Personal factors affecting patient at time of initial evaluation include: lives in multi story house, inability to navigate community distances, inability to navigate level surfaces without external assistance, inability to perform dynamic tasks in community, inability to perform physical activity and inability to perform IADLS   Prior to admission, patient was requiring assist for functional mobility with walker, requiring assist for ADLS, requiring assist for IADLS, living with family in a three level home with 3 steps to enter and home with family assist   Please find objective findings from Physical Therapy assessment regarding body systems outlined above with impairments and limitations including weakness, decreased ROM, impaired balance, decreased endurance, impaired coordination, gait deviations, decreased activity tolerance, decreased functional mobility tolerance, fall risk and SOB upon exertion  The Barthel Index was used as a functional outcome tool presenting with a score of Barthel Index Score: 55 today indicating marked limitations of functional mobility and ADLS    Patient's clinical presentation is currently unstable/unpredictable as seen in patient's presentation of vital sign response, increased fall risk, new onset of impairment of functional mobility, decreased endurance and new onset of weakness  Pt would benefit from continued Physical Therapy treatment to address deficits as defined above and maximize level of functional mobility  As demonstrated by objective findings, the assigned level of complexity for this evaluation is high  The patient's [facility] Basic Mobility Inpatient Short Form Raw Score is 20  A Raw score of greater than 16 suggests the patient may benefit   Goals   Patient Goals to go home and continue with PT   STG Expiration Date 03/04/22   Short Term Goal #1 transfers and gait with roller walker independently   Short Term Goal #2 gait endurance to functional household distances   LTG Expiration Date 03/11/22   Long Term Goal #1 strength BLEs 4/5   Long Term Goal #2 gait and transfers with cane independently   Plan   Treatment/Interventions ADL retraining;Functional transfer training;LE strengthening/ROM; Therapeutic exercise;Elevations; Endurance training;Patient/family training;Equipment eval/education;Gait training; Compensatory technique education; Bed mobility   PT Frequency Other (Comment)  (5w)   [facility] Basic Mobility Inpatient   Turning in Bed Without Bedrails 4   Lying on Back to Sitting on Edge of Flat Bed 4   Moving Bed to Chair 3   Standing Up From Chair 4   Walk in Room 3   Climb 3-5 Stairs 2   Basic Mobility Inpatient Raw Score 20   Basic Mobility Standardized Score 43 99   Highest Level Of Mobility   JH-HLM Goal 6: Walk 10 steps or more   JH-HLM Highest Level of Mobility 6: Walk 10 steps or more   JH-HLM Goal Achieved Yes   Barthel Index   Feeding 10   Bathing 0   Grooming Score 5   Dressing Score 5   Bladder Score 5   Bowels Score 10   Toilet Use Score 5   Transfers (Bed/Chair) Score 10   Mobility (Level Surface) Score 0   Stairs Score 5   Barthel Index Score 55   Additional Treatment Session   Start Time 1440   End Time 1455   Treatment Assessment S: patient without pain O: BLE exercise completed sitting in chair, standing balance activity completed as well A: patient will benefit from continued PT with progression as tolerated   Exercises   Hip Flexion Sitting;10 reps;Bilateral   Knee AROM Short Arc Quad Sitting;10 reps;Bilateral   Knee AROM Long Arc Quad Sitting;10 reps;Bilateral   Ankle Pumps Sitting;10 reps;Bilateral   Balance training  sidestepping and backward walking completed   Licensure   [place] License Number  Brandin Jh  [id number]

## 2022-02-25 NOTE — OCCUPATIONAL THERAPY NOTE
Occupational Therapy Evaluation/Treatment       02/25/22 1137   Note Type   Note type Evaluation   Restrictions/Precautions   Other Precautions Chair Alarm; Bed Alarm;Multiple lines;Telemetry;O2;Fall Risk   Pain Assessment   Pain Assessment Tool 0-10   Pain Score No Pain   Home Living   Type of [address] [place] [address] Multi-level;Stairs to enter with rails;Bed/bath upstairs; Able to live on main level with bedroom/bathroom  (4 [address])   Bathroom Shower/Tub Walk-in shower   Bathroom Toilet Standard   Bathroom Equipment Shower chair;Commode   Bathroom Accessibility Accessible via walker   [address] [place] [address],[address]; Wheelchair-manual;Other (Comment)  (O2, Life Vest)   Additional Comments Pt ambulating at home with RW, sleeps in recliner on 1st floor since coming home from rehab 2 weeks ago   Prior Function   Level of [place] Needs assistance with ADLs and functional mobility; Needs assistance with IADLs   Lives With Son  (and his girlfriend)   Receives Help From Family   ADL Assistance Needs assistance   IADLs Needs assistance   Falls in the last 6 months 0   Vocational On disability   Comments Pt unable to get upstairs, so sleeps in recliner, sponge bathes, and uses bedside commode on 1st floor   Psychosocial   Psychosocial (WDL) WDL   Patient Behaviors/Mood Calm; Cooperative   Ability to Express Feelings Able to express   Ability to Express Needs Able to express   Ability to Express Thoughts Able to express   Ability to Understand Others Understands   ADL   Where Assessed Chair   Eating Assistance 7  Independent   Grooming Assistance 5  Supervision/Setup   UB Bathing Assistance 4  Minimal Assistance   LB Bathing Assistance 3  Moderate Assistance   [address] 4  Minimal [address] 3  Moderate [address]  4  Minimal Assistance   Functional Deficit Setup;Steadying;Verbal cueing; Increased time to complete   Additional Comments for all ADLS due to decreased strength, decreased endurance and decreased balance at this time   Bed Mobility   Rolling R 7  Independent   Rolling L 7  Independent   Supine to Sit 5  Supervision   Transfers   Sit to Stand 5  Supervision   Additional items Verbal cues   Stand to Sit 5  Supervision   Additional items Verbal cues   Stand pivot 4  Minimal assistance   Additional items Verbal cues  (with RW)   Balance   Static Sitting Fair +   Dynamic Sitting Fair +   Static Standing Fair   Dynamic Standing Fair -   Ambulatory Fair  (with RW)   Activity Tolerance   Activity Tolerance Patient limited by fatigue; Other (Comment)  (Limited by multiple lines and trach mask with O2)   Nurse Made Aware Katja Corea RN   RUE Assessment   RUE Assessment WFL  (gross strength 4-/5)   LUE Assessment   LUE Assessment WFL  (gross strength 4-/5)   Hand Function   Gross Motor Coordination Functional   Fine Motor Coordination Functional   Sensation   Light Touch No apparent deficits   Vision-Basic Assessment   Current Vision Does not wear glasses   Perception   Spatial Orientation Appears intact   Cognition   Overall Cognitive Status WFL   Arousal/Participation Alert; Cooperative   Attention Attends with cues to redirect   Orientation Level Oriented X4   Memory Within functional limits   Following Commands Follows all commands and directions without difficulty   Assessment   Limitation Decreased ADL status; Decreased UE strength;Decreased Safe judgement during ADL;Decreased endurance;Decreased self-care trans;Decreased high-level ADLs  (decreased balance and mobility)   Prognosis Good   Assessment Patient evaluated by Occupational Therapy  Patient admitted with Acute hypoxemic respiratory failure ([facility] [address] )  The patients occupational profile, medical and therapy history includes a extensive additional review of physical, cognitive, or psychosocial history related to current functional performance  Comorbidities affecting functional mobility and ADLS include:  Anxiety, Aortic aneurysm (HCC),CAD, afib, Arthritis, Depression, Diabetes mellitus ([address] ), Fibromyalgia, GERD (gastroesophageal reflux disease), GERD, Hyperlipidemia, Hypertension, Migraines, Psychiatric disorder, cardiac arrest and VT on lifevest  She has had multiple hospitalizations since the end of last year after experiencing a STEMI in October  Had a prolonged hospitalization 65/61-55/56/83 complicated by cardiogenic shock, VT arrest x2, and sepsis  Required T&P at that time and was d/c to [facility]  She then experienced cardiac arrest 1/1/22 while at [facility], had ROSC pre hospital after AED shock x1  Prior to admission, patient was requiring assist for functional mobility with RW, requiring assist for ADLS, requiring assist for IADLS and living with son and his girlfriend in a 3 level home with 4 steps to enter  The evaluation identifies the following performance deficits: weakness, impaired balance, decreased endurance, increased fall risk, new onset of impairment of functional mobility, decreased ADLS, decreased IADLS, decreased activity tolerance, decreased safety awareness, SOB upon exertion and decreased strength, that result in activity limitations and/or participation restrictions  This evaluation requires clinical decision making of high complexity, because the patient presents with comorbidites that affect occupational performance and required significant modification of tasks or assistance with consideration of multiple treatment options  The Barthel Index was used as a functional outcome tool presenting with a score of Barthel Index Score: 50, indicating marked limitations of functional mobility and ADLS  The patient's raw score on the AM-PAC Daily Activity inpatient short form is 17, standardized score is 37 26, less than 39 4  Patients at this level are likely to benefit from DC to post-acute rehabilitation services  However, pt may be able to return home if she has enough family support with home health rehab   Patient will benefit from skilled Occupational Therapy services to address above deficits and facilitate a safe return to prior level of function  Goals   Patient Goals "get home"   STG Time Frame   (1-7)   Short Term Goal #1 Goals established to promote Patient Goals: "get home":  Patient will increase standing tolerance to 4 minutes during ADL task to decrease assistance level and decrease fall risk; Patient will increase bed mobility to independent in preparation for ADLS and transfers; Patient will increase functional mobility to and from bedside commode with rolling walker with supervision to increase performance with ADLS and to use a toilet; Patient will tolerate 10 minutes of UE ROM/strengthening to increase general activity tolerance and performance in ADLS/IADLS; Patient will improve functional activity tolerance to 10 minutes of sustained functional tasks to increase participation in basic self-care and decrease assistance level;  Patient will be able to to verbalize understanding and perform energy conservation/proper body mechanics during ADLS and functional mobility at least 75% of the time with minimal cueing to decrease signs of fatigue and increase stamina to return to prior level of function; Patient will increase static/dynamic sitting balance to good to improve the ability to sit at edge of bed or on a chair for ADLS;  Patient will increase dynamic standing balance to fair to improve postural stability and decrease fall risk during standing ADLS and transfers       LTG Time Frame   (8-14)   Long Term Goal #1 Patient will increase standing tolerance to 7 minutes during ADL task to decrease assistance level and decrease fall risk; Patient will increase functional mobility to and from bathroom with rolling walker independently to increase performance with ADLS and to use a toilet; Patient will tolerate 15 minutes of UE ROM/strengthening to increase general activity tolerance and performance in ADLS/IADLS; Patient will improve functional activity tolerance to 15 minutes of sustained functional tasks to increase participation in basic self-care and decrease assistance level;  Patient will be able to to verbalize understanding and perform energy conservation/proper body mechanics during ADLS and functional mobility at least 90% of the time with no cueing to decrease signs of fatigue and increase stamina to return to prior level of function; Patient will increase static/dynamic standing balance to fair+ to improve postural stability and decrease fall risk during standing ADLS and transfers  Pt will score >/= 22/24 on AM-PAC Daily Activity Inpatient scale to promote safe independence with ADLs and functional mobility; Pt will score >/= 70/100 on Barthel Index in order to decrease caregiver assistance needed and increase ability to perform ADLs and functional mobility  Functional Transfer Goals   Pt Will Perform All Functional Transfers With mod indep; With assistive devices; With cueing; With good judgment/safety  (LTG- Independent)   ADL Goals   Pt Will Perform All ADL's In chair; With stand by assist;With setup; With cues; With good judgment/safety; With adaptive equipment  (LTG- Independent)   Plan   Treatment Interventions ADL retraining;Functional transfer training;UE strengthening/ROM; Endurance training;Patient/family training;Equipment evaluation/education; Compensatory technique education; Energy conservation; Activityengagement   Goal Expiration Date 03/11/22   OT Frequency 3-5x/wk   Additional Treatment Session   Start Time 1120   End Time 1137   Treatment Assessment Pt seen for ADL training  Education and training with teachback method begun with pt regarding energy conservation/proper body mechanics during ADLS and functional mobility to decrease fall risks, decrease signs of fatigue and increase stamina needed to return to prior level of function  Transfer bed to chair with Isaac and RW and cues    Toileting with Isaac for clothing management and steadying by bedside commode  Recommended by call nurse to use BSC vs purewick to facilitate increased ffucntional activity and return home  Moderate SOB with exertion requiring cues for deep, pursed lip breathing  Provided cardiac education and emotional support to pt  Pt demonstrating good verbal understanding of all information provided and all questions answered  Patient left OOB in chair with all needs within reach, SCD pumps, and tab alarm in place  Continue OT per POC  Recommendation   OT Discharge Recommendation Post acute rehabilitation services   AM-PAC Daily Activity Inpatient   Lower Body Dressing 2   Bathing 2   Toileting 3   Upper Body Dressing 3   Grooming 3   Eating 4   Daily Activity Raw Score 17   Daily Activity Standardized Score (Calc for Raw Score >=11) 37 26   AM-PAC Applied Cognition Inpatient   Following a Speech/Presentation 4   Understanding Ordinary Conversation 4   Taking Medications 4   Remembering Where Things Are Placed or Put Away 4   Remembering List of 4-5 Errands 4   Taking Care of Complicated Tasks 2  (due to trach)   Applied Cognition Raw Score 22   Applied Cognition Standardized Score 47 83   Barthel Index   Feeding 10   Bathing 0   Grooming Score 5   Dressing Score 5   Bladder Score 5   Bowels Score 10   Toilet Use Score 5   Transfers (Bed/Chair) Score 10   Mobility (Level Surface) Score 0   Stairs Score 0   Barthel Index Score 50   Licensure   [place] License Number  Ethel Fofana   Ketty [place]ick [address], OTR/L, [place] Lic# [id number]

## 2022-02-25 NOTE — ASSESSMENT & PLAN NOTE
Lab Results   Component Value Date    HGBA1C 6 6 (H) 01/01/2022       · Continue atorvastatin  · DM treatment per above plan   (P) 173 8

## 2022-02-25 NOTE — ASSESSMENT & PLAN NOTE
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 in January of this year 2/2 infected R  lung bullae  S/p Linezolid then doxycycline 4 week course  · Last ECHO 10/30/21: EF 40%, akinesis of the entire inferior and anterolateral portion of the LV  Wall thickness is moderately increased  · Home medications include metoprolol 25mg PO BID and furosemide 40mg PO BID  Is also on Losartan 25mg PO daily   · CTA chest 2/23: b/l pleural effusions and b/l opacities possibly 2/2 pulmonary edema  · Reports increasing lower extremity edema  Unclear compliance with low sodium diet  Will discuss with son today  · S/p extra dose of lasix 40mg IV in ED, diuresed 650ml and 300ml urine  Net neg 2 3L this stay  · ECHO 2/24: EF 50%  Moderate hypokinesis of the inferior and the basal anterolateral walls  Trivial effusion, no evidence of tamponade  · Daily weights  · Low sodium diet    · Close I&O monitoring  · Appreciate Cardiology following

## 2022-02-26 PROBLEM — J96.21 ACUTE ON CHRONIC RESPIRATORY FAILURE WITH HYPOXIA (HCC): Status: ACTIVE | Noted: 2021-11-03

## 2022-02-26 LAB
ANION GAP SERPL CALCULATED.3IONS-SCNC: 10 MMOL/L (ref 4–13)
ATRIAL RATE: 83 BPM
BUN SERPL-MCNC: 15 MG/DL (ref 5–25)
CALCIUM SERPL-MCNC: 8.1 MG/DL (ref 8.3–10.1)
CHLORIDE SERPL-SCNC: 105 MMOL/L (ref 100–108)
CO2 SERPL-SCNC: 28 MMOL/L (ref 21–32)
CREAT SERPL-MCNC: 0.87 MG/DL (ref 0.6–1.3)
ERYTHROCYTE [DISTWIDTH] IN BLOOD BY AUTOMATED COUNT: 18.8 % (ref 11.6–15.1)
GFR SERPL CREATININE-BSD FRML MDRD: 75 ML/MIN/1.73SQ M
GLUCOSE SERPL-MCNC: 161 MG/DL (ref 65–140)
GLUCOSE SERPL-MCNC: 168 MG/DL (ref 65–140)
GLUCOSE SERPL-MCNC: 174 MG/DL (ref 65–140)
GLUCOSE SERPL-MCNC: 205 MG/DL (ref 65–140)
HCT VFR BLD AUTO: 27.4 % (ref 34.8–46.1)
HGB BLD-MCNC: 8 G/DL (ref 11.5–15.4)
MAGNESIUM SERPL-MCNC: 2 MG/DL (ref 1.6–2.6)
MCH RBC QN AUTO: 24.8 PG (ref 26.8–34.3)
MCHC RBC AUTO-ENTMCNC: 29.2 G/DL (ref 31.4–37.4)
MCV RBC AUTO: 85 FL (ref 82–98)
P AXIS: 26 DEGREES
PLATELET # BLD AUTO: 305 THOUSANDS/UL (ref 149–390)
PMV BLD AUTO: 10.8 FL (ref 8.9–12.7)
POTASSIUM SERPL-SCNC: 3.7 MMOL/L (ref 3.5–5.3)
PR INTERVAL: 142 MS
QRS AXIS: 69 DEGREES
QRSD INTERVAL: 128 MS
QT INTERVAL: 490 MS
QTC INTERVAL: 575 MS
RBC # BLD AUTO: 3.22 MILLION/UL (ref 3.81–5.12)
SODIUM SERPL-SCNC: 143 MMOL/L (ref 136–145)
T WAVE AXIS: 18 DEGREES
VENTRICULAR RATE: 83 BPM
WBC # BLD AUTO: 8.09 THOUSAND/UL (ref 4.31–10.16)

## 2022-02-26 PROCEDURE — 80048 BASIC METABOLIC PNL TOTAL CA: CPT | Performed by: NURSE PRACTITIONER

## 2022-02-26 PROCEDURE — 94640 AIRWAY INHALATION TREATMENT: CPT

## 2022-02-26 PROCEDURE — 82948 REAGENT STRIP/BLOOD GLUCOSE: CPT

## 2022-02-26 PROCEDURE — 85027 COMPLETE CBC AUTOMATED: CPT | Performed by: NURSE PRACTITIONER

## 2022-02-26 PROCEDURE — 83735 ASSAY OF MAGNESIUM: CPT | Performed by: NURSE PRACTITIONER

## 2022-02-26 PROCEDURE — 99233 SBSQ HOSP IP/OBS HIGH 50: CPT | Performed by: INTERNAL MEDICINE

## 2022-02-26 PROCEDURE — 94760 N-INVAS EAR/PLS OXIMETRY 1: CPT

## 2022-02-26 PROCEDURE — 99232 SBSQ HOSP IP/OBS MODERATE 35: CPT | Performed by: INTERNAL MEDICINE

## 2022-02-26 PROCEDURE — 93010 ELECTROCARDIOGRAM REPORT: CPT | Performed by: INTERNAL MEDICINE

## 2022-02-26 RX ORDER — SODIUM CHLORIDE 30 MG/ML INHALATION SOLUTION 30 MG/ML
4 SOLUTION INHALANT 3 TIMES DAILY
Status: DISCONTINUED | OUTPATIENT
Start: 2022-02-27 | End: 2022-03-03 | Stop reason: HOSPADM

## 2022-02-26 RX ORDER — MAGNESIUM SULFATE HEPTAHYDRATE 40 MG/ML
2 INJECTION, SOLUTION INTRAVENOUS ONCE
Status: COMPLETED | OUTPATIENT
Start: 2022-02-26 | End: 2022-02-26

## 2022-02-26 RX ORDER — POTASSIUM CHLORIDE 20MEQ/15ML
40 LIQUID (ML) ORAL ONCE
Status: COMPLETED | OUTPATIENT
Start: 2022-02-26 | End: 2022-02-26

## 2022-02-26 RX ADMIN — LOSARTAN POTASSIUM 50 MG: 50 TABLET, FILM COATED ORAL at 08:26

## 2022-02-26 RX ADMIN — FAMOTIDINE 20 MG: 40 POWDER, FOR SUSPENSION ORAL at 08:28

## 2022-02-26 RX ADMIN — INSULIN LISPRO 2 UNITS: 100 INJECTION, SOLUTION INTRAVENOUS; SUBCUTANEOUS at 21:19

## 2022-02-26 RX ADMIN — LEVALBUTEROL HYDROCHLORIDE 1.25 MG: 1.25 SOLUTION, CONCENTRATE RESPIRATORY (INHALATION) at 08:07

## 2022-02-26 RX ADMIN — APIXABAN 5 MG: 5 TABLET, FILM COATED ORAL at 08:26

## 2022-02-26 RX ADMIN — Medication 6 MG: at 21:16

## 2022-02-26 RX ADMIN — IPRATROPIUM BROMIDE 0.5 MG: 0.5 SOLUTION RESPIRATORY (INHALATION) at 08:07

## 2022-02-26 RX ADMIN — CHLORHEXIDINE GLUCONATE 0.12% ORAL RINSE 15 ML: 1.2 LIQUID ORAL at 21:15

## 2022-02-26 RX ADMIN — INSULIN LISPRO 2 UNITS: 100 INJECTION, SOLUTION INTRAVENOUS; SUBCUTANEOUS at 11:46

## 2022-02-26 RX ADMIN — APIXABAN 5 MG: 5 TABLET, FILM COATED ORAL at 17:37

## 2022-02-26 RX ADMIN — TICAGRELOR 90 MG: 90 TABLET ORAL at 21:15

## 2022-02-26 RX ADMIN — LEVALBUTEROL HYDROCHLORIDE 1.25 MG: 1.25 SOLUTION, CONCENTRATE RESPIRATORY (INHALATION) at 13:25

## 2022-02-26 RX ADMIN — LORAZEPAM 0.5 MG: 0.5 TABLET ORAL at 18:42

## 2022-02-26 RX ADMIN — PREGABALIN 75 MG: 75 CAPSULE ORAL at 08:26

## 2022-02-26 RX ADMIN — QUETIAPINE FUMARATE 25 MG: 25 TABLET ORAL at 21:16

## 2022-02-26 RX ADMIN — POTASSIUM CHLORIDE 40 MEQ: 20 SOLUTION ORAL at 09:17

## 2022-02-26 RX ADMIN — SODIUM CHLORIDE SOLN NEBU 3% 4 ML: 3 NEBU SOLN at 13:25

## 2022-02-26 RX ADMIN — CHLORHEXIDINE GLUCONATE 0.12% ORAL RINSE 15 ML: 1.2 LIQUID ORAL at 08:26

## 2022-02-26 RX ADMIN — MAGNESIUM SULFATE HEPTAHYDRATE 2 G: 40 INJECTION, SOLUTION INTRAVENOUS at 09:17

## 2022-02-26 RX ADMIN — SENNOSIDES AND DOCUSATE SODIUM 1 TABLET: 50; 8.6 TABLET ORAL at 21:15

## 2022-02-26 RX ADMIN — LEVALBUTEROL HYDROCHLORIDE 1.25 MG: 1.25 SOLUTION, CONCENTRATE RESPIRATORY (INHALATION) at 19:19

## 2022-02-26 RX ADMIN — POLYETHYLENE GLYCOL 3350 17 G: 17 POWDER, FOR SOLUTION ORAL at 08:26

## 2022-02-26 RX ADMIN — ATORVASTATIN CALCIUM 40 MG: 40 TABLET, FILM COATED ORAL at 08:25

## 2022-02-26 RX ADMIN — FAMOTIDINE 20 MG: 40 POWDER, FOR SUSPENSION ORAL at 17:37

## 2022-02-26 RX ADMIN — AMIODARONE HYDROCHLORIDE 200 MG: 200 TABLET ORAL at 07:55

## 2022-02-26 RX ADMIN — BUDESONIDE 0.5 MG: 0.5 INHALANT ORAL at 19:19

## 2022-02-26 RX ADMIN — SODIUM CHLORIDE SOLN NEBU 3% 4 ML: 3 NEBU SOLN at 08:07

## 2022-02-26 RX ADMIN — METOPROLOL TARTRATE 25 MG: 25 TABLET, FILM COATED ORAL at 08:26

## 2022-02-26 RX ADMIN — IPRATROPIUM BROMIDE 0.5 MG: 0.5 SOLUTION RESPIRATORY (INHALATION) at 13:25

## 2022-02-26 RX ADMIN — BUDESONIDE 0.5 MG: 0.5 INHALANT ORAL at 08:07

## 2022-02-26 RX ADMIN — INSULIN LISPRO 2 UNITS: 100 INJECTION, SOLUTION INTRAVENOUS; SUBCUTANEOUS at 07:56

## 2022-02-26 RX ADMIN — IPRATROPIUM BROMIDE 0.5 MG: 0.5 SOLUTION RESPIRATORY (INHALATION) at 19:19

## 2022-02-26 RX ADMIN — TICAGRELOR 90 MG: 90 TABLET ORAL at 08:26

## 2022-02-26 RX ADMIN — INSULIN LISPRO 2 UNITS: 100 INJECTION, SOLUTION INTRAVENOUS; SUBCUTANEOUS at 16:29

## 2022-02-26 RX ADMIN — SODIUM CHLORIDE SOLN NEBU 3% 4 ML: 3 NEBU SOLN at 01:22

## 2022-02-26 RX ADMIN — FUROSEMIDE 40 MG: 10 INJECTION, SOLUTION INTRAMUSCULAR; INTRAVENOUS at 07:57

## 2022-02-26 RX ADMIN — SODIUM CHLORIDE SOLN NEBU 3% 4 ML: 3 NEBU SOLN at 19:19

## 2022-02-26 RX ADMIN — METOPROLOL TARTRATE 25 MG: 25 TABLET, FILM COATED ORAL at 21:18

## 2022-02-26 NOTE — ASSESSMENT & PLAN NOTE
Lab Results   Component Value Date    HGBA1C 6 6 (H) 01/01/2022       Recent Labs     02/25/22  0757 02/25/22  1107 02/25/22  1601 02/25/22 2048   POCGLU 131 190* 163* 176*       Blood Sugar Average: Last 72 hrs:  · (P) 169 2   · Discharged from inpatient rehab on Basaglar 8 units daily and Apidra 2 units tid   · Hold long acting insulin for now, BG's controlled on SSI  · ACHS BG monitoring with SSI   · Goal -180

## 2022-02-26 NOTE — ASSESSMENT & PLAN NOTE
Lab Results   Component Value Date    HGBA1C 6 6 (H) 01/01/2022       · Continue atorvastatin  · DM treatment per above plan   (P) 169 2

## 2022-02-26 NOTE — PLAN OF CARE
Problem: PAIN - ADULT  Goal: Verbalizes/displays adequate comfort level or baseline comfort level  Description: Interventions:  - Encourage patient to monitor pain and request assistance  - Assess pain using appropriate pain scale  - Administer analgesics based on type and severity of pain and evaluate response  - Implement non-pharmacological measures as appropriate and evaluate response  - Consider cultural and social influences on pain and pain management  - Notify physician/advanced practitioner if interventions unsuccessful or patient reports new pain  Outcome: Progressing     Problem: INFECTION - ADULT  Goal: Absence or prevention of progression during hospitalization  Description: INTERVENTIONS:  - Assess and monitor for signs and symptoms of infection  - Monitor lab/diagnostic results  - Monitor all insertion sites, i e  indwelling lines, tubes, and drains  - Monitor endotracheal if appropriate and nasal secretions for changes in amount and color  - [place] appropriate cooling/warming therapies per order  - Administer medications as ordered  - Instruct and encourage patient and family to use good hand hygiene technique  - Identify and instruct in appropriate isolation precautions for identified infection/condition  Outcome: Progressing  Goal: Absence of fever/infection during neutropenic period  Description: INTERVENTIONS:  - Monitor WBC    Outcome: Progressing     Problem: SAFETY ADULT  Goal: Patient will remain free of falls  Description: INTERVENTIONS:  - Educate patient/family on patient safety including physical limitations  - Instruct patient to call for assistance with activity   - Consult OT/PT to assist with strengthening/mobility   - Keep Call bell within reach  - Keep bed low and locked with side rails adjusted as appropriate  - Keep care items and personal belongings within reach  - Initiate and maintain comfort rounds  - Make Fall Risk Sign visible to staff  - Apply yellow socks and bracelet for high fall risk patients  - Consider moving patient to room near nurses station  Outcome: Progressing  Goal: Maintain or return to baseline ADL function  Description: INTERVENTIONS:  -  Assess patient's ability to carry out ADLs; assess patient's baseline for ADL function and identify physical deficits which impact ability to perform ADLs (bathing, care of mouth/teeth, toileting, grooming, dressing, etc )  - Assess/evaluate cause of self-care deficits   - Assess range of motion  - Assess patient's mobility; develop plan if impaired  - Assess patient's need for assistive devices and provide as appropriate  - Encourage maximum independence but intervene and supervise when necessary  - Involve family in performance of ADLs  - Assess for home care needs following discharge   - Consider OT consult to assist with ADL evaluation and planning for discharge  - Provide patient education as appropriate  Outcome: Progressing  Goal: Maintains/Returns to pre admission functional level  Description: INTERVENTIONS:  - Perform BMAT or MOVE assessment daily    - Set and communicate daily mobility goal to care team and patient/family/caregiver  - Collaborate with rehabilitation services on mobility goals if consulted  - Out of bed for toileting  - Record patient progress and toleration of activity level   Outcome: Progressing     Problem: Knowledge Deficit  Goal: Patient/family/caregiver demonstrates understanding of disease process, treatment plan, medications, and discharge instructions  Description: Complete learning assessment and assess knowledge base    Interventions:  - Provide teaching at level of understanding  - Provide teaching via preferred learning methods  Outcome: Progressing     Problem: CARDIOVASCULAR - ADULT  Goal: Maintains optimal cardiac output and hemodynamic stability  Description: INTERVENTIONS:  - Monitor I/O, vital signs and rhythm  - Monitor for S/S and trends of decreased cardiac output  - Administer and titrate ordered vasoactive medications to optimize hemodynamic stability  - Assess quality of pulses, skin color and temperature  - Assess for signs of decreased coronary artery perfusion  - Instruct patient to report change in severity of symptoms  Outcome: Progressing  Goal: Absence of cardiac dysrhythmias or at baseline rhythm  Description: INTERVENTIONS:  - Continuous cardiac monitoring, vital signs, obtain 12 lead EKG if ordered  - Administer antiarrhythmic and heart rate control medications as ordered  - Monitor electrolytes and administer replacement therapy as ordered  Outcome: Progressing     Problem: METABOLIC, FLUID AND ELECTROLYTES - ADULT  Goal: Electrolytes maintained within normal limits  Description: INTERVENTIONS:  - Monitor labs and assess patient for signs and symptoms of electrolyte imbalances  - Administer electrolyte replacement as ordered  - Monitor response to electrolyte replacements, including repeat lab results as appropriate  - Instruct patient on fluid and nutrition as appropriate  Outcome: Progressing  Goal: Fluid balance maintained  Description: INTERVENTIONS:  - Monitor labs   - Monitor I/O and WT  - Instruct patient on fluid and nutrition as appropriate  - Assess for signs & symptoms of volume excess or deficit  Outcome: Progressing  Goal: Glucose maintained within target range  Description: INTERVENTIONS:  - Monitor Blood Glucose as ordered  - Assess for signs and symptoms of hyperglycemia and hypoglycemia  - Administer ordered medications to maintain glucose within target range  - Assess nutritional intake and initiate nutrition service referral as needed  Outcome: Progressing     Problem: SKIN/TISSUE INTEGRITY - ADULT  Goal: Skin Integrity remains intact(Skin Breakdown Prevention)  Description: Assess:  -Perform Sumit assessment  -Clean and moisturize skin  -Inspect skin when repositioning, toileting, and assisting with ADLS  -Assess under medical devices  -Assess extremities for adequate circulation and sensation     Bed Management:  -Have minimal linens on bed & keep smooth, unwrinkled  -Change linens as needed when moist or perspiring  -Avoid sitting or lying in one position for more than 2 hours while in bed  -Keep HOB at 30 degrees     Toileting:  -Offer bedside commode  -Assess for incontinence  -Use incontinent care products after each incontinent episode        Skin Care:  -Avoid use of baby powder, tape, friction and shearing, hot water or constrictive clothing  Outcome: Progressing  Goal: Incision(s), wounds(s) or drain site(s) healing without S/S of infection  Description: INTERVENTIONS  - Assess and document dressing, incision, wound bed, drain sites and surrounding tissue  - Provide patient and family education  Outcome: Progressing  Goal: Pressure injury heals and does not worsen  Description: Interventions:  - Implement low air loss mattress or specialty surface (Criteria met)  - Apply silicone foam dressing  - Use special pressure reducing interventions when in chair   - Apply fecal or urinary incontinence containment device   - Perform passive or active ROM   - Turn and reposition patient & offload bony prominences every 2hours   - Utilize friction reducing device or surface for transfers   - Consider consults to  interdisciplinary teams  - Use incontinent care products after each incontinent episode  - Consider nutrition services referral as needed  Outcome: Progressing     Problem: MUSCULOSKELETAL - ADULT  Goal: Maintain or return mobility to safest level of function  Description: INTERVENTIONS:  - Assess patient's ability to carry out ADLs; assess patient's baseline for ADL function and identify physical deficits which impact ability to perform ADLs (bathing, care of mouth/teeth, toileting, grooming, dressing, etc )  - Assess/evaluate cause of self-care deficits   - Assess range of motion  - Assess patient's mobility  - Assess patient's need for assistive devices and provide as appropriate  - Encourage maximum independence but intervene and supervise when necessary  - Involve family in performance of ADLs  - Assess for home care needs following discharge   - Consider OT consult to assist with ADL evaluation and planning for discharge  - Provide patient education as appropriate  Outcome: Progressing  Goal: Maintain proper alignment of affected body part  Description: INTERVENTIONS:  - Support, maintain and protect limb and body alignment  - Provide patient/ family with appropriate education  Outcome: Progressing     Problem: RESPIRATORY - ADULT  Goal: Achieves optimal ventilation and oxygenation  Description: INTERVENTIONS:  - Assess for changes in respiratory status  - Assess for changes in mentation and behavior  - Position to facilitate oxygenation and minimize respiratory effort  - Oxygen administered by appropriate delivery if ordered  - Initiate smoking cessation education as indicated  - Encourage broncho-pulmonary hygiene including cough, deep breathe, Incentive Spirometry  - Assess the need for suctioning and aspirate as needed  - Assess and instruct to report SOB or any respiratory difficulty  - Respiratory Therapy support as indicated  Outcome: Progressing     Problem: MOBILITY - ADULT  Goal: Maintain or return to baseline ADL function  Description: INTERVENTIONS:  -  Assess patient's ability to carry out ADLs; assess patient's baseline for ADL function and identify physical deficits which impact ability to perform ADLs (bathing, care of mouth/teeth, toileting, grooming, dressing, etc )  - Assess/evaluate cause of self-care deficits   - Assess range of motion  - Assess patient's mobility; develop plan if impaired  - Assess patient's need for assistive devices and provide as appropriate  - Encourage maximum independence but intervene and supervise when necessary  - Involve family in performance of ADLs  - Assess for home care needs following discharge   - Consider OT consult to assist with ADL evaluation and planning for discharge  - Provide patient education as appropriate  Outcome: Progressing  Goal: Maintains/Returns to pre admission functional level  Description: INTERVENTIONS:  - Perform BMAT or MOVE assessment daily    - Set and communicate daily mobility goal to care team and patient/family/caregiver     - Collaborate with rehabilitation services on mobility goals if consulted  - Out of bed for toileting  - Record patient progress and toleration of activity level   Outcome: Progressing     Problem: Potential for Falls  Goal: Patient will remain free of falls  Description: INTERVENTIONS:  - Educate patient/family on patient safety including physical limitations  - Instruct patient to call for assistance with activity   - Consult OT/PT to assist with strengthening/mobility   - Keep Call bell within reach  - Keep bed low and locked with side rails adjusted as appropriate  - Keep care items and personal belongings within reach  - Initiate and maintain comfort rounds  - Make Fall Risk Sign visible to staff  - Apply yellow socks and bracelet for high fall risk patients  - Consider moving patient to room near nurses station  Outcome: Progressing     Problem: Prexisting or High Potential for Compromised Skin Integrity  Goal: Skin integrity is maintained or improved  Description: INTERVENTIONS:  - Identify patients at risk for skin breakdown  - Assess and monitor skin integrity  - Assess and monitor nutrition and hydration status  - Monitor labs   - Assess for incontinence   - Turn and reposition patient  - Assist with mobility/ambulation  - Relieve pressure over bony prominences  - Avoid friction and shearing  - Provide appropriate hygiene as needed including keeping skin clean and dry  - Evaluate need for skin moisturizer/barrier cream  - Collaborate with interdisciplinary team   - Patient/family teaching  - Consider wound care consult   Outcome: Progressing     Problem: Nutrition/Hydration-ADULT  Goal: Nutrient/Hydration intake appropriate for improving, restoring or maintaining nutritional needs  Description: Monitor and assess patient's nutrition/hydration status for malnutrition  Collaborate with interdisciplinary team and initiate plan and interventions as ordered  Monitor patient's weight and dietary intake as ordered or per policy  Utilize nutrition screening tool and intervene as necessary  Determine patient's food preferences and provide high-protein, high-caloric foods as appropriate       INTERVENTIONS:  - Monitor oral intake, urinary output, labs, and treatment plans  - Assess nutrition and hydration status and recommend course of action  - Evaluate amount of meals eaten  - Assist patient with eating if necessary   - Allow adequate time for meals  - Recommend/ encourage appropriate diets, oral nutritional supplements, and vitamin/mineral supplements  - Order, calculate, and assess calorie counts as needed  - Recommend, monitor, and adjust tube feedings and TPN/PPN based on assessed needs  - Assess need for intravenous fluids  - Provide specific nutrition/hydration education as appropriate  - Include patient/family/caregiver in decisions related to nutrition  Outcome: Progressing

## 2022-02-26 NOTE — ASSESSMENT & PLAN NOTE
· Had difficulty with placement of ETT with prior respiratory arrest 1/14/22  · Now s/p tracheostomy #6 cuffed sepideh  · Seen by speech, unable to utilize passy willard valve in setting of stenosis  · Tolerates foods/meds PO safely

## 2022-02-26 NOTE — PROGRESS NOTES
Progress Note - Cardiology   [facility]     Melinda Arana 55 y o  female MRN: [id number]  : 1965  Unit/Bed#: ICU 12 Encounter: [id number]    EKG / Monitor: Personally reviewed  Sinus rhythm at 66/Min     ASSESSMENT:  Acute hypoxic respiratory failure  Could have been secondary to acute on chronic DHF with atrial fibrillation   Now maintaining sinus rhythm  Rule out pulmonary pathologies  Has chronic tracheostomy with subglottic stenosis and RVSP of 55 mmHg       Normal troponins    Pro NT      TTE, 5798:  OP 47%, RV systolic pressure 54 mmHg     S/p tracheostomy  History of subglottic stenosis      CAD, S/p STEMI and PCI of mid circumflex and balloon angioplasty of Om2      S/p VT arrest, on life vest pending ICD implantation after resolution of sepsis      PAF, now in sinus rhythm     Diabetes mellitus     History of hypertension     Anxiety disorder     Anemia, hemoglobin 8 0        RECOMMENDATIONS:  Continue Eliquis, amiodarone, atorvastatin, Brilinta, metoprolol and losartan  Continue IV Lasix while monitoring I/O and electrolytes  ICD implantation when cleared by ID                Subjective / Objective:     Review of Systems   Patient looks comfortable  Claims to be feeling better  Denies any chest pain or excessive dyspnea  Vitals: Blood pressure 124/67, pulse 65, temperature (!) 97 2 °F (36 2 °C), temperature source Temporal, resp  rate 21, height 5' 10" (1 778 m), weight 90 3 kg (199 lb 1 2 oz), SpO2 98 %  Vitals:    22 0837 22 0600   Weight: 91 2 kg (201 lb) 90 3 kg (199 lb 1 2 oz)     Body mass index is 28 56 kg/m²    BP Readings from Last 3 Encounters:   22 124/67   22 149/82   22 155/75     Orthostatic Blood Pressures      Most Recent Value   Blood Pressure 124/67 filed at 2022 07   Patient Position - Orthostatic VS Lying filed at 2022        I/O        07 07 0701  02/27 0700    P  O   1000     I V  (mL/kg)  40 (0 4)     IV Piggyback 150      Total Intake(mL/kg) 150 (1 6) 1040 (11 5)     Urine (mL/kg/hr) 2550 (1 2) 2850 (1 3) 1000 (2 6)    Stool  0     Total Output 2550 2850 1000    Net -2400 -1810 -1000           Unmeasured Urine Occurrence 2 x 1 x     Unmeasured Stool Occurrence  2 x         Invasive Devices  Report    Peripheral Intravenous Line            Peripheral IV 02/23/22 Right Antecubital 2 days    Peripheral IV 02/23/22 Right Wrist 2 days          Drain            External Urinary Catheter 2 days          Airway            Surgical Airway 1 day                  Intake/Output Summary (Last 24 hours) at 2/26/2022 1120  Last data filed at 2/26/2022 2834  Gross per 24 hour   Intake 520 ml   Output 3400 ml   Net -2880 ml         Physical Exam:   Physical Exam    Neurologic:  Alert & oriented x 3,  no focal deficits noted   Constitutional:  Well developed, well nourished,  With no acute distress  Eyes:  PERRL, conjunctiva normal   HENT:  Atraumatic, external ears normal, nose normal,   NECK:  Tracheostomy present  Respiratory:  Bilateral air entry, mostly clear to auscultation  Cardiovascular: Regular S1-S2 with a I/VI ejection systolic murmur  No pericardial rub or gallop audible  GI:  Soft, nondistended, audible bowel sounds, nontender, no hepatosplenomegaly appreciated  Musculoskeletal:  No tenderness, no deformities  Extremities:  No appreciable pitting edema   Distal pulses are present  Psychiatric:  Speech and behavior appropriate             Medications/ Allergies:     Current Facility-Administered Medications   Medication Dose Route Frequency Provider Last Rate    acetaminophen  975 mg Per G Tube Q6H PRN NEELAM Correa      albuterol  2 5 mg Nebulization Q4H PRN NEELAM Correa      amiodarone  200 mg Oral Daily With Breakfast NEELAM Correa      apixaban  5 mg Oral BID NEELAM Correa      atorvastatin  40 mg Oral Daily Aiden Gomes NEELAM Ontiveros      budesonide  0 5 mg Nebulization Q12H Roz Spironello V, NEELAM      chlorhexidine  15 mL Mouth/Throat Q12H [facility] NEELAM [place]      famotidine  20 mg Oral BID NEELAM [place]      furosemide  40 mg Intravenous BID (diuretic) NEELAM [place]      insulin lispro  1-6 Units Subcutaneous HS NEELAM [place]      insulin lispro  2-12 Units Subcutaneous TID AC NEELAM [place]      ipratropium  0 5 mg Nebulization TID NEELAM [place]      levalbuterol  1 25 mg Nebulization TID NEELAM [place]      LORazepam  0 5 mg Oral BID PRN NEELAM [place]      losartan  50 mg Oral Daily Lupis MillinNEELAM      melatonin  6 mg Oral HS NEELAM [place]      metoprolol tartrate  25 mg Oral Q12H [facility] NEELAM [place]      ondansetron  4 mg Intravenous Q6H PRN NEELAM [place]      polyethylene glycol  17 g Oral Daily NEELAM [place]      pregabalin  75 mg Oral Daily NEELAM [place]      QUEtiapine  25 mg Oral HS NEELAM [place]      senna-docusate sodium  1 tablet Oral HS NEELAM [place]      sodium chloride  4 mL Nebulization Q6H Roz Spironello NEELAM GOODRICH      ticagrelor  90 mg Oral Q12H [facility] NEELAM [place]       acetaminophen, 975 mg, Q6H PRN  albuterol, 2 5 mg, Q4H PRN  LORazepam, 0 5 mg, BID PRN  ondansetron, 4 mg, Q6H PRN      Allergies   Allergen Reactions    Metformin Diarrhea    Clonidine Rash     Patch          Labs:   Troponins:      CBC with diff:  Results from last 7 days   Lab Units 02/26/22  0737 02/25/22  0551 02/24/22  0518 02/23/22  2038 02/21/22  1301   WBC Thousand/uL 8 09 8 09 8 60 9 86 9 27   HEMOGLOBIN g/dL 8 0* 7 9* 7 7* 10 4* 9 7*   HEMATOCRIT % 27 4* 27 0* 25 4* 34 0* 32 3*   MCV fL 85 85 85 83 84   PLATELETS Thousands/uL 305 287 288 392* 307   MCH pg 24 8* 24 9* 25 8* 25 5* 25 2*   MCHC g/dL 29 2* 29 3* 30 3* 30 6* 30 0*   RDW % 18 8* 19 3* 18 7* 18 7* 18 6*   MPV fL 10 8 11 0 11 2 11 6 11 3 NRBC AUTO /100 WBCs  --   --  0 0 0       CMP:  Results from last 7 days   Lab Units 02/26/22  0737 02/25/22  0551 02/24/22  0518 02/23/22 2038 02/21/22  1301   SODIUM mmol/L 143 145 144 145 143   POTASSIUM mmol/L 3 7 3 9 3 1* 3 3* 3 5   CHLORIDE mmol/L 105 110* 111* 108 109*   CO2 mmol/L 28 27 25 24 22   ANION GAP mmol/L 10 8 8 13 12   BUN mg/dL 15 14 12 13 13   CREATININE mg/dL 0 87 0 90 0 81 0 91 0 72   CALCIUM mg/dL 8 1* 8 2* 7 9* 9 1 8 8   AST U/L  --   --  12 14* 10*   ALT U/L  --   --  18 13 10   ALK PHOS U/L  --   --  83 92 1 86 5   TOTAL PROTEIN g/dL  --   --  6 0* 7 6 7 0   ALBUMIN g/dL  --   --  2 6* 3 9 3 6   TOTAL BILIRUBIN mg/dL  --   --  0 54 0 69 0 87   EGFR ml/min/1 73sq m 75 72 82 71 94       Magnesium:  Results from last 7 days   Lab Units 02/26/22  0737 02/25/22  0551 02/24/22 0518 02/23/22 2038   MAGNESIUM mg/dL 2 0 2 3 1 9 1 5*     Coags:  Results from last 7 days   Lab Units 02/23/22 2038   PTT seconds 25   INR  1 32*     TSH:    No components found for: TSH3  Lipid Profile:    Hgb A1c:    NT-proBNP: No results for input(s): NTBNP in the last 72 hours  Imaging & Testing   I have personally reviewed pertinent reports  XR chest 1 view portable    Result Date: 2/24/2022  Narrative: CHEST INDICATION:   sob  COMPARISON:  Chest radiograph 2/21/2022  EXAM PERFORMED/VIEWS:  XR CHEST PORTABLE FINDINGS:  Lesser extent tracheostomy unchanged  Heart shadow is enlarged but unchanged from prior exam  Stable mild pulmonary edema  Stable small left pleural effusion  No pneumothorax  Osseous structures appear within normal limits for patient age  Impression: Stable mild pulmonary edema and small left pleural effusion  Workstation performed: [id number]     XR chest portable    Result Date: 2/21/2022  Narrative: CHEST INDICATION:   SOB, trach  COMPARISON:  Most recent is chest x-ray dated February 12, 2022   EXAM PERFORMED/VIEWS:  XR CHEST PORTABLE FINDINGS: Cardiomediastinal silhouette appears unremarkable  Moderate pulmonary edema, similar to the prior study  Life best and tracheostomy unchanged  Probable bilateral left greater than right pleural effusions  Osseous structures appear within normal limits for patient age  Impression: No acute cardiopulmonary disease  Workstation performed: [id number]     XR chest portable    Result Date: 2/13/2022  Narrative: CHEST INDICATION:   SOB  COMPARISON:  Chest radiograph from 11/10/2021 and chest CT from 11/14/2021  EXAM PERFORMED/VIEWS:  XR CHEST PORTABLE FINDINGS:  Tracheostomy  Cardiomediastinal silhouette appears unremarkable  Life vest  Moderate pulmonary edema  No effusion or pneumothorax  Osseous structures appear within normal limits for patient age  Impression: Moderate pulmonary edema  Workstation performed: [id number]     CTA ED chest PE Study    Result Date: 2/23/2022  Narrative: CTA - CHEST WITH IV CONTRAST - PULMONARY ANGIOGRAM INDICATION:   Shortness of breath, + dimer, trach, CHF  COMPARISON: CT of the chest abdomen pelvis on November 14, 2021  TECHNIQUE: CTA examination of the chest was performed using angiographic technique according to a protocol specifically tailored to evaluate for pulmonary embolism  Axial, sagittal, and coronal 2D reformatted images were created from the source data and  submitted for interpretation  In addition, coronal 3D MIP postprocessing was performed on the acquisition scanner  Radiation dose length product (DLP) for this visit:  468 6 mGy-cm   This examination, like all CT scans performed in the [facility], was performed utilizing techniques to minimize radiation dose exposure, including the use of iterative reconstruction and automated exposure control  IV Contrast:  100 mL of iohexol (OMNIPAQUE)  FINDINGS: PULMONARY ARTERIAL TREE:  No pulmonary embolus is seen  LUNGS: Tracheostomy tube in place  Hypoventilatory changes of lungs    Mild diffuse groundglass opacity within the lungs may be due to pulmonary edema  There are patchy opacities at the bilateral lower lobe bases and within the posterior left upper lobe  which may be due to pneumonia in the appropriate clinical setting  PLEURA:  Small bilateral pleural effusions  HEART/GREAT VESSELS:  Cardiomegaly  No pericardial effusion  Coronary artery calcifications  No thoracic aortic aneurysm  MEDIASTINUM AND REECE:  Mediastinal lymph nodes measure up to 9 mm in short axis  CHEST WALL AND LOWER NECK:   Unremarkable  VISUALIZED STRUCTURES IN THE UPPER ABDOMEN:  Unremarkable  OSSEOUS STRUCTURES: No acute fracture  Redemonstrated old sternal and bilateral rib fractures  Impression: 1  No evidence of pulmonary embolism  2   Mild diffuse ground glass opacities in the lungs may be due to pulmonary edema  Small bilateral pleural effusions  Cardiomegaly  Correlate for heart failure  3   Patchy opacities at the bilateral lower lobe bases and within the posterior left upper lobe which may be due to atelectasis and/or pneumonia in the appropriate clinical setting  Workstation performed: [id number]     Echo complete w/ contrast if indicated    Result Date: 2/24/2022  Narrative: Jazmyn Marmitchel  Left Ventricle: Left ventricular cavity size is normal  Wall thickness is moderately increased  Systolic function is normal   Estimated ejection fraction is 50%  There is moderate hypokinesis of the inferior and the basal anterolateral walls  Diastolic function is normal  There is moderate concentric hypertrophy    Left Atrium: The atrium is mildly dilated    Right Atrium: The atrium is mildly dilated    Tricuspid Valve: There is mild regurgitation  The right ventricular systolic pressure is mildly to moderately elevated at 54 mm Hg    Aorta: Aortic root is mildly dilated  Consider CTA for more accurate evaluation    Pericardium: There is a trivial pericardial effusion  There is no echocardiographic evidence of tamponade  There is a left pleural effusion     Changes include: Ejection fraction has improved  RV systolic pressure is elevated  EKG / Monitor: Personally reviewed  Sinus rhythm, heart rate 66 per minute        Dr Paula Man MD, [facility] [place]      "This note has been constructed using a voice recognition system  Therefore there may be syntax, spelling, and/or grammatical errors   Please call if you have any questions  "

## 2022-02-26 NOTE — ASSESSMENT & PLAN NOTE
· Admitted for STEMI [facility] 10/22-11/23/21  · S/p cardiac cath "mid LCX culprit lesion at bifurcation of the OM2 both vessels were small in caliber, t/w 2 5 x 28 mm Xience PATRICA, OM2 was ballooned but not stented; mild nonobstructive disease in LAD and RCA"  · Continue home regimen -  daily Eliquis, ticagrelor, statin  · EKG NSR with R  BBB  No ST changes  · Troponin negative x2  No further trending  · Continuous cardiac monitoring  Life vest removed on admission

## 2022-02-26 NOTE — PLAN OF CARE
Problem: PAIN - ADULT  Goal: Verbalizes/displays adequate comfort level or baseline comfort level  Description: Interventions:  - Encourage patient to monitor pain and request assistance  - Assess pain using appropriate pain scale  - Administer analgesics based on type and severity of pain and evaluate response  - Implement non-pharmacological measures as appropriate and evaluate response  - Consider cultural and social influences on pain and pain management  - Notify physician/advanced practitioner if interventions unsuccessful or patient reports new pain  Outcome: Progressing     Problem: INFECTION - ADULT  Goal: Absence or prevention of progression during hospitalization  Description: INTERVENTIONS:  - Assess and monitor for signs and symptoms of infection  - Monitor lab/diagnostic results  - Monitor all insertion sites, i e  indwelling lines, tubes, and drains  - Monitor endotracheal if appropriate and nasal secretions for changes in amount and color  - [place] appropriate cooling/warming therapies per order  - Administer medications as ordered  - Instruct and encourage patient and family to use good hand hygiene technique  - Identify and instruct in appropriate isolation precautions for identified infection/condition  Outcome: Progressing  Goal: Absence of fever/infection during neutropenic period  Description: INTERVENTIONS:  - Monitor WBC    Outcome: Progressing     Problem: SAFETY ADULT  Goal: Patient will remain free of falls  Description: INTERVENTIONS:  - Educate patient/family on patient safety including physical limitations  - Instruct patient to call for assistance with activity   - Consult OT/PT to assist with strengthening/mobility   - Keep Call bell within reach  - Keep bed low and locked with side rails adjusted as appropriate  - Keep care items and personal belongings within reach  - Initiate and maintain comfort rounds  - Make Fall Risk Sign visible to staff  - Offer Toileting every 2 Hours, in advance of need  - Initiate/Maintain bed alarm    - Apply yellow socks and bracelet for high fall risk patients  - Consider moving patient to room near nurses station  Outcome: Progressing  Goal: Maintain or return to baseline ADL function  Description: INTERVENTIONS:  -  Assess patient's ability to carry out ADLs; assess patient's baseline for ADL function and identify physical deficits which impact ability to perform ADLs (bathing, care of mouth/teeth, toileting, grooming, dressing, etc )  - Assess/evaluate cause of self-care deficits   - Assess range of motion  - Assess patient's mobility; develop plan if impaired  - Assess patient's need for assistive devices and provide as appropriate  - Encourage maximum independence but intervene and supervise when necessary  - Involve family in performance of ADLs  - Assess for home care needs following discharge   - Consider OT consult to assist with ADL evaluation and planning for discharge  - Provide patient education as appropriate  Outcome: Progressing  Goal: Maintains/Returns to pre admission functional level  Description: INTERVENTIONS:  - Perform BMAT or MOVE assessment daily    - Set and communicate daily mobility goal to care team and patient/family/caregiver  - Collaborate with rehabilitation services on mobility goals if consulted  - Perform Range of Motion 4 times a day  - Reposition patient every 2 hours  - Out of bed for meals 3  times a day  - Out of bed for toileting  - Record patient progress and toleration of activity level   Outcome: Progressing     Problem: Knowledge Deficit  Goal: Patient/family/caregiver demonstrates understanding of disease process, treatment plan, medications, and discharge instructions  Description: Complete learning assessment and assess knowledge base    Interventions:  - Provide teaching at level of understanding  - Provide teaching via preferred learning methods  Outcome: Progressing     Problem: CARDIOVASCULAR - ADULT  Goal: Maintains optimal cardiac output and hemodynamic stability  Description: INTERVENTIONS:  - Monitor I/O, vital signs and rhythm  - Monitor for S/S and trends of decreased cardiac output  - Administer and titrate ordered vasoactive medications to optimize hemodynamic stability  - Assess quality of pulses, skin color and temperature  - Assess for signs of decreased coronary artery perfusion  - Instruct patient to report change in severity of symptoms  Outcome: Progressing  Goal: Absence of cardiac dysrhythmias or at baseline rhythm  Description: INTERVENTIONS:  - Continuous cardiac monitoring, vital signs, obtain 12 lead EKG if ordered  - Administer antiarrhythmic and heart rate control medications as ordered  - Monitor electrolytes and administer replacement therapy as ordered  Outcome: Progressing     Problem: METABOLIC, FLUID AND ELECTROLYTES - ADULT  Goal: Electrolytes maintained within normal limits  Description: INTERVENTIONS:  - Monitor labs and assess patient for signs and symptoms of electrolyte imbalances  - Administer electrolyte replacement as ordered  - Monitor response to electrolyte replacements, including repeat lab results as appropriate  - Instruct patient on fluid and nutrition as appropriate  Outcome: Progressing  Goal: Fluid balance maintained  Description: INTERVENTIONS:  - Monitor labs   - Monitor I/O and WT  - Instruct patient on fluid and nutrition as appropriate  - Assess for signs & symptoms of volume excess or deficit  Outcome: Progressing  Goal: Glucose maintained within target range  Description: INTERVENTIONS:  - Monitor Blood Glucose as ordered  - Assess for signs and symptoms of hyperglycemia and hypoglycemia  - Administer ordered medications to maintain glucose within target range  - Assess nutritional intake and initiate nutrition service referral as needed  Outcome: Progressing     Problem: SKIN/TISSUE INTEGRITY - ADULT  Goal: Skin Integrity remains intact(Skin Breakdown Prevention)  Description: Assess:    -Clean and moisturize skin every 2 hrs  -Inspect skin when repositioning, toileting, and assisting with ADLS  -Assess extremities for adequate circulation and sensation     Bed Management:  -Have minimal linens on bed & keep smooth, unwrinkled  -Change linens as needed when moist or perspiring  -Avoid sitting or lying in one position for more than 2 hours while in bed  -Keep HOB at 45 degrees     Toileting:  -Offer bedside commode  -Assess for incontinence every 2 hrs    Activity:  -Mobilize patient 3 times a day  -Encourage activity and walks on unit  -Encourage or provide ROM exercises   -Turn and reposition patient every 2 Hours  -Use appropriate equipment to lift or move patient in bed  -Instruct/ Assist with weight shifting every 2 hrs when out of bed in chair  -Consider limitation of chair time 2  hour intervals    Skin Care:  -Avoid use of baby powder, tape, friction and shearing, hot water or constrictive clothing  -Relieve pressure over bony prominences using wedges  -Do not massage red bony areas      -Consider consults to  interdisciplinary teams such as PT  Outcome: Progressing  Goal: Incision(s), wounds(s) or drain site(s) healing without S/S of infection  Description: INTERVENTIONS  - Assess and document dressing, incision, wound bed, drain sites and surrounding tissue  - Provide patient and family education    Outcome: Progressing  Goal: Pressure injury heals and does not worsen  Description: Interventions:  - Implement low air loss mattress or specialty surface (Criteria met)  - Apply silicone foam dressing    - Use special pressure reducing interventions such as waffle cushions when in chair   - Apply fecal or urinary incontinence containment device   - Perform passive or active ROM every 4hrs  - Turn and reposition patient & offload bony prominences every 2  hours   - Utilize friction reducing device or surface for transfers  - Consider nutrition services referral as needed  Outcome: Progressing     Problem: MUSCULOSKELETAL - ADULT  Goal: Maintain or return mobility to safest level of function  Description: INTERVENTIONS:  - Assess patient's ability to carry out ADLs; assess patient's baseline for ADL function and identify physical deficits which impact ability to perform ADLs (bathing, care of mouth/teeth, toileting, grooming, dressing, etc )  - Assess/evaluate cause of self-care deficits   - Assess range of motion  - Assess patient's mobility  - Assess patient's need for assistive devices and provide as appropriate  - Encourage maximum independence but intervene and supervise when necessary  - Involve family in performance of ADLs  - Assess for home care needs following discharge   - Consider OT consult to assist with ADL evaluation and planning for discharge  - Provide patient education as appropriate  Outcome: Progressing  Goal: Maintain proper alignment of affected body part  Description: INTERVENTIONS:  - Support, maintain and protect limb and body alignment  - Provide patient/ family with appropriate education  Outcome: Progressing     Problem: RESPIRATORY - ADULT  Goal: Achieves optimal ventilation and oxygenation  Description: INTERVENTIONS:  - Assess for changes in respiratory status  - Assess for changes in mentation and behavior  - Position to facilitate oxygenation and minimize respiratory effort  - Oxygen administered by appropriate delivery if ordered  - Initiate smoking cessation education as indicated  - Encourage broncho-pulmonary hygiene including cough, deep breathe, Incentive Spirometry  - Assess the need for suctioning and aspirate as needed  - Assess and instruct to report SOB or any respiratory difficulty  - Respiratory Therapy support as indicated  Outcome: Progressing     Problem: MOBILITY - ADULT  Goal: Maintain or return to baseline ADL function  Description: INTERVENTIONS:  -  Assess patient's ability to carry out ADLs; assess patient's baseline for ADL function and identify physical deficits which impact ability to perform ADLs (bathing, care of mouth/teeth, toileting, grooming, dressing, etc )  - Assess/evaluate cause of self-care deficits   - Assess range of motion  - Assess patient's mobility; develop plan if impaired  - Assess patient's need for assistive devices and provide as appropriate  - Encourage maximum independence but intervene and supervise when necessary  - Involve family in performance of ADLs  - Assess for home care needs following discharge   - Consider OT consult to assist with ADL evaluation and planning for discharge  - Provide patient education as appropriate  Outcome: Progressing  Goal: Maintains/Returns to pre admission functional level  Description: INTERVENTIONS:  - Perform BMAT or MOVE assessment daily    - Set and communicate daily mobility goal to care team and patient/family/caregiver     - Collaborate with rehabilitation services on mobility goals if consulted    - Out of bed for meals 3 times a day  - Out of bed for toileting  - Record patient progress and toleration of activity level   Outcome: Progressing     Problem: Potential for Falls  Goal: Patient will remain free of falls  Description: INTERVENTIONS:  - Educate patient/family on patient safety including physical limitations  - Instruct patient to call for assistance with activity   - Consult OT/PT to assist with strengthening/mobility   - Keep Call bell within reach  - Keep bed low and locked with side rails adjusted as appropriate  - Keep care items and personal belongings within reach  - Initiate and maintain comfort rounds  - Make Fall Risk Sign visible to staff  - Apply yellow socks and bracelet for high fall risk patients  - Consider moving patient to room near nurses station  Outcome: Progressing     Problem: Prexisting or High Potential for Compromised Skin Integrity  Goal: Skin integrity is maintained or improved  Description: INTERVENTIONS:  - Identify patients at risk for skin breakdown  - Assess and monitor skin integrity  - Assess and monitor nutrition and hydration status  - Monitor labs   - Assess for incontinence   - Turn and reposition patient  - Assist with mobility/ambulation  - Relieve pressure over bony prominences  - Avoid friction and shearing  - Provide appropriate hygiene as needed including keeping skin clean and dry  - Evaluate need for skin moisturizer/barrier cream  - Collaborate with interdisciplinary team   - Patient/family teaching  - Consider wound care consult   Outcome: Progressing     Problem: Nutrition/Hydration-ADULT  Goal: Nutrient/Hydration intake appropriate for improving, restoring or maintaining nutritional needs  Description: Monitor and assess patient's nutrition/hydration status for malnutrition  Collaborate with interdisciplinary team and initiate plan and interventions as ordered  Monitor patient's weight and dietary intake as ordered or per policy  Utilize nutrition screening tool and intervene as necessary  Determine patient's food preferences and provide high-protein, high-caloric foods as appropriate       INTERVENTIONS:  - Monitor oral intake, urinary output, labs, and treatment plans  - Assess nutrition and hydration status and recommend course of action  - Evaluate amount of meals eaten  - Assist patient with eating if necessary   - Allow adequate time for meals  - Recommend/ encourage appropriate diets, oral nutritional supplements, and vitamin/mineral supplements  - Order, calculate, and assess calorie counts as needed  - Recommend, monitor, and adjust tube feedings and TPN/PPN based on assessed needs  - Assess need for intravenous fluids  - Provide specific nutrition/hydration education as appropriate  - Include patient/family/caregiver in decisions related to nutrition  Outcome: Progressing

## 2022-02-26 NOTE — QUICK NOTE
Crow Medley Organ updated via phone  Aware that patient continues to improve and will be downgraded to the general medicine service today  All questions have been answered to his satisfaction

## 2022-02-26 NOTE — ASSESSMENT & PLAN NOTE
· History of afib on Eliquis and PO amiodarone   · Currently in NSR with R  BBB  · Continuous cardiac monitoring  · Continue Eliquis and amio 200 mg daily

## 2022-02-26 NOTE — ASSESSMENT & PLAN NOTE
· Home medications include cozaar, furosemide  Also on metoprolol for afib     · Losartan increased to 50 mg daily   · Cards following, recommendations appreciated   · BP currently controlled  · Goal SBP <160

## 2022-02-26 NOTE — ASSESSMENT & PLAN NOTE
· Hx of respiratory arrest last month, admitted to [facility]  S/p T&P  On 10L FiO2 via trach collar @ baseline  · Presentated to ED 2/22 for SOB, thought 2/2 anxiety +/- CHF exacerbation  Was given lasix, nebs, lorazepam and symptoms improved  Was recommended for admission, but requested to leave  · [place] [facility] [place] via her son with c/o acute onset SOB  · SpO2 89-92% on arrival to ED  Placed on PS 10/6 and 50% after trach exchanged to cuffed trach  SpO2 and WOB improved to 100%  · NT-proBNP 463, history of heart failure EF 40%  Reports recent increase in peripheral edema  Reports compliance with lasix 40mg BID  · D-dimer 2 55  Is on Eliquis OP for history of Afib  · CTA chest 2/23: "No PE  Mild diffuse ground glass opacities in the lungs may be due to pulmonary edema  Small bilateral pleural effusions  Cardiomegaly  Correlate for heart failure  Patchy opacities at the bilateral lower lobe bases and within the posterior left upper lobe which may be due to atelectasis and/or pneumonia"  · S/p furosemide 40mg x1 in ED  Continues on furosemide 40mg IV BID  Net fluid balance -2 3L at time of note  · Respiratory failure likely 2/2 heart failure, see plan below  · Afebrile  No leukocytosis  Doubt pulmonary infection  Flu/COVID negative  · Recently treated w/10 days of doxycycline for R  Lung bulla vs  Pneumatocele  Monitor off of antibiotics  · MRSA swab negative   · BCx negative x 24 hrs   · Procal negative, remains afebrile   Trend temperature curve and CBC  · Wean FiO2 to goal SpO2 >90%  · Has been stable on trach collar 10 L since shortly after admission

## 2022-02-26 NOTE — PROGRESS NOTES
[address]  Progress Note - Ariel Ramos 1965, 55 y o  female MRN: [id number]  Unit/Bed#: ICU 12 Encounter: [id number]  Primary Care Provider: Nadira White MD   Date and time admitted to hospital: 2/24/2022 12:26 AM    * Acute on chronic respiratory failure with hypoxia [facility])  Assessment & Plan  · Hx of respiratory arrest last month, admitted to [facility] 25  S/p T&P  On 10L FiO2 via trach collar @ baseline  · Presentated to ED 2/22 for SOB, thought 2/2 anxiety +/- CHF exacerbation  Was given lasix, nebs, lorazepam and symptoms improved  Was recommended for admission, but requested to leave  · Presented again to [facility] ED 2/23 via her son with c/o acute onset SOB  · SpO2 89-92% on arrival to ED  Placed on PS 10/6 and 50% after trach exchanged to cuffed trach  SpO2 and WOB improved to 100%  · NT-proBNP 463, history of heart failure EF 40%  Reports recent increase in peripheral edema  Reports compliance with lasix 40mg BID  · D-dimer 2 55  Is on Eliquis OP for history of Afib  · CTA chest 2/23: "No PE  Mild diffuse ground glass opacities in the lungs may be due to pulmonary edema  Small bilateral pleural effusions  Cardiomegaly  Correlate for heart failure  Patchy opacities at the bilateral lower lobe bases and within the posterior left upper lobe which may be due to atelectasis and/or pneumonia"  · S/p furosemide 40mg x1 in ED  Continues on furosemide 40mg IV BID  Net fluid balance -2 3L at time of note  · Respiratory failure likely 2/2 heart failure, see plan below  · Afebrile  No leukocytosis  Doubt pulmonary infection  Flu/COVID negative  · Recently treated w/10 days of doxycycline for R  Lung bulla vs  Pneumatocele  Monitor off of antibiotics  · MRSA swab negative   · BCx negative x 24 hrs   · Procal negative, remains afebrile   Trend temperature curve and CBC  · Wean FiO2 to goal SpO2 >90%  · Has been stable on trach collar 10 L since shortly after admission    Heart failure [facility])  Assessment & Plan  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 in January of this year 2/2 infected R  lung bullae  S/p Linezolid then doxycycline 4 week course  · Last ECHO 10/30/21: EF 40%, akinesis of the entire inferior and anterolateral portion of the LV  Wall thickness is moderately increased  · Home medications include metoprolol 25mg PO BID and furosemide 40mg PO BID  Is also on Losartan 25mg PO daily   · CTA chest 2/23: b/l pleural effusions and b/l opacities possibly 2/2 pulmonary edema  · Reports increasing lower extremity edema  Unclear compliance with low sodium diet  Will discuss with son today  · S/p extra dose of lasix 40mg IV in ED, diuresed 650ml and 300ml urine  Net neg 2 3L this stay  · ECHO 2/24: EF 50%  Moderate hypokinesis of the inferior and the basal anterolateral walls  Trivial effusion, no evidence of tamponade  · Daily weights  · Low sodium diet  · Close I&O monitoring  · Appreciate Cardiology following        CAD (coronary artery disease)  Assessment & Plan  · Admitted for STEMI SLB 10/22-11/23/21  · S/p cardiac cath "mid LCX culprit lesion at bifurcation of the OM2 both vessels were small in caliber, t/w 2 5 x 28 mm Xience PATRICA, OM2 was ballooned but not stented; mild nonobstructive disease in LAD and RCA"  · Continue home regimen -  daily Eliquis, ticagrelor, statin  · EKG NSR with R  BBB  No ST changes  · Troponin negative x2  No further trending  · Continuous cardiac monitoring  Life vest removed on admission      A-fib [facility])  Assessment & Plan  · History of afib on Eliquis and PO amiodarone   · Currently in NSR with R  BBB  · Continuous cardiac monitoring  · Continue Eliquis and amio 200 mg daily     History of Ventricular tachycardia ([facility] [address] )  Assessment & Plan  · Significant cardiac history with multiple recent hospitalizations  Had prolonged hospitalization at [facility] secondary to STEMI 10/22/21 s/p PATRICA to mid LCx complicated by cardiogenic shock and cardiac arrest secondary to VT x 2  Was d/c to rehab with T&P  · Presented to [facility] on 1/1/2022 from [place] [address] with cardiac arrest  She had ROSC pre hospital after AED shock x1  Seen by cardiology and did not feel arrest was ischemia-driven  Recommended ICD placement for secondary prevention  Patient's intervention deferred in setting of infected R  Lung bullae  Now s/p treatment x10D with doxycycline  · Discharged from [facility] on [facility] 2/10 with plan for elective ICD at 4 weeks  Denies any shocks delivered since vest initiation  · Records show Amiodarone 200mg PO BID starting 11/23     · Continue amio 200mg PO daily   · Continuous cardiac monitoring, no arrhythmias since admission   · Goal K>4, Mg>2      Uncontrolled type 2 diabetes mellitus with complication, with long-term current use of insulin [facility])  Assessment & Plan  Lab Results   Component Value Date    HGBA1C 6 6 (H) 01/01/2022       Recent Labs     02/25/22  0757 02/25/22  1107 02/25/22  1601 02/25/22  2048   POCGLU 131 190* 163* 176*       Blood Sugar Average: Last 72 hrs:  · (P) 169 2   · Discharged from inpatient rehab on Basaglar 8 units daily and Apidra 2 units tid   · Hold long acting insulin for now, BG's controlled on SSI  · ACHS BG monitoring with SSI   · Goal -180    History of Cardiac arrest [facility])  Assessment & Plan  · See plan for Ventricular tachycardia above      Uncontrolled hypertension-resolved as of 2/24/2022  Assessment & Plan  · Normotensive currently     Hyperlipidemia associated with type 2 diabetes mellitus ([facility] )  Assessment & Plan  Lab Results   Component Value Date    HGBA1C 6 6 (H) 01/01/2022       · Continue atorvastatin  · DM treatment per above plan   (P) 169 2    Hypertension  Assessment & Plan  · Home medications include cozaar, furosemide  Also on metoprolol for afib  · Losartan increased to 50 mg daily   · Cards following, recommendations appreciated   · BP currently controlled  · Goal SBP <160    Subglottic stenosis  Assessment & Plan  · Had difficulty with placement of ETT with prior respiratory arrest 22  · Now s/p tracheostomy #6 cuffed shiley  · Seen by speech, unable to utilize passy willard valve in setting of stenosis  · Tolerates foods/meds PO safely     Anxiety  Assessment & Plan  · Severely anxious when dyspneic on arrival to ED and ICU  · Takes lorazepam 0 5mg PO BID OP  · Anxiety much improved after transition to trach collar  · Will continue PRN lorazepam     ----------------------------------------------------------------------------------------  HPI/24hr events: Remained on trach collar 24 hours and tolerated well  Repeat echo with Ef improved to 50%  Diuresing well with lasix 40 mg IV BID  Patient appropriate for transfer out of the ICU today?: Patient does not meet criteria for ICU Follow-up Clinic; referral has not been made  Disposition: Transfer to Med-Surg   Code Status: Level 1 - Full Code  ---------------------------------------------------------------------------------------  SUBJECTIVE  Reports feeling better, SOB improved       Review of Systems  Review of systems was reviewed and negative unless stated above in HPI/24-hour events   ---------------------------------------------------------------------------------------  OBJECTIVE    Vitals   Vitals:    22 0158 22 0200 22 0300 22 0400   BP:  113/57 122/64 121/60   Pulse: 66 66 66 65   Resp: 16 12 (!) 24 22   Temp:       TempSrc:       SpO2: 98% 99% 91% 98%   Weight:       Height:         Temp (24hrs), Av 4 °F (36 3 °C), Min:97 1 °F (36 2 °C), Max:98 °F (36 7 °C)  Current: Temperature: (!) 97 2 °F (36 2 °C)          Respiratory:  SpO2: SpO2: 98 %, SpO2 Device: O2 Device: Trach mask       Invasive/non-invasive ventilation settings   Respiratory  Report   Lab Data (Last 4 hours)    None         O2/Vent Data (Last 4 hours)    None                Physical Exam  Vitals reviewed  Constitutional:       General: She is awake  She is not in acute distress  Appearance: She is normal weight  She is not toxic-appearing or diaphoretic  Eyes:      Pupils: Pupils are equal, round, and reactive to light  Neck:      Trachea: Tracheostomy present  Comments: + trach in place with trach mask   Cardiovascular:      Rate and Rhythm: Normal rate and regular rhythm  Heart sounds: Normal heart sounds  Pulmonary:      Effort: Pulmonary effort is normal  No tachypnea  Breath sounds: Normal breath sounds  Abdominal:      General: There is no distension  Palpations: Abdomen is soft  Tenderness: There is no abdominal tenderness  Musculoskeletal:      Right lower leg: No edema  Left lower leg: No edema  Skin:     General: Skin is warm and dry  Neurological:      General: No focal deficit present  Mental Status: She is alert               Laboratory and Diagnostics:  Results from last 7 days   Lab Units 02/25/22  0551 02/24/22  0518 02/23/22 2038 02/21/22  1301   WBC Thousand/uL 8 09 8 60 9 86 9 27   HEMOGLOBIN g/dL 7 9* 7 7* 10 4* 9 7*   HEMATOCRIT % 27 0* 25 4* 34 0* 32 3*   PLATELETS Thousands/uL 287 288 392* 307   NEUTROS PCT %  --  71 69 75   MONOS PCT %  --  7 7 8     Results from last 7 days   Lab Units 02/25/22  0551 02/24/22  0518 02/23/22 2038 02/21/22  1301   SODIUM mmol/L 145 144 145 143   POTASSIUM mmol/L 3 9 3 1* 3 3* 3 5   CHLORIDE mmol/L 110* 111* 108 109*   CO2 mmol/L 27 25 24 22   ANION GAP mmol/L 8 8 13 12   BUN mg/dL 14 12 13 13   CREATININE mg/dL 0 90 0 81 0 91 0 72   CALCIUM mg/dL 8 2* 7 9* 9 1 8 8   GLUCOSE RANDOM mg/dL 153* 197* 205* 199*   ALT U/L  --  18 13 10   AST U/L  --  12 14* 10*   ALK PHOS U/L  --  83 92 1 86  5   ALBUMIN g/dL  --  2 6* 3 9 3 6   TOTAL BILIRUBIN mg/dL  --  0 54 0 69 0 87     Results from last 7 days   Lab Units 02/25/22  0551 02/24/22  0518 02/23/22 2038   MAGNESIUM mg/dL 2 3 1 9 1 5*   PHOSPHORUS mg/dL 4 7* 4 1  --       Results from last 7 days   Lab Units 02/23/22 2038   INR  1 32*   PTT seconds 25          Results from last 7 days   Lab Units 02/23/22  2220 02/23/22 2038   LACTIC ACID mmol/L 1 6 2 4*     ABG:    VBG:  Results from last 7 days   Lab Units 02/23/22 2038   PH FRANCO  7 367   PCO2 FRANCO mm Hg 43 1   PO2 FRANCO mm Hg 35 0   HCO3 FRANCO mmol/L 24 2   BASE EXC FRANCO mmol/L -1 2     Results from last 7 days   Lab Units 02/25/22  0551 02/24/22  0518   PROCALCITONIN ng/ml 0 13 0 10       Micro  Results from last 7 days   Lab Units 02/24/22  0521 02/24/22  0519 02/24/22  0114 02/23/22 2038   BLOOD CULTURE  No Growth at 24 hrs  No Growth at 24 hrs   --  No Growth at 48 hrs  No Growth at 24 hrs  MRSA CULTURE ONLY   --   --  No Methicillin Resistant Staphlyococcus aureus (MRSA) isolated  --        EKG: NSR on telemetry   Imaging: I have personally reviewed pertinent reports  and I have personally reviewed pertinent films in PACS    Intake and Output  I/O       02/24 0701  02/25 0700 02/25 0701  02/26 0700    P  O   1000    I V  (mL/kg)  40 (0 4)    IV Piggyback 150     Total Intake(mL/kg) 150 (1 6) 1040 (11 4)    Urine (mL/kg/hr) 2550 (1 2) 2550 (1 2)    Stool  0    Total Output 2550 2550    Net -2400 -1510          Unmeasured Urine Occurrence 2 x 1 x    Unmeasured Stool Occurrence  2 x          Height and Weights   Height: 5' 10" (177 8 cm)     Body mass index is 28 84 kg/m²  Weight (last 2 days)     Date/Time Weight    02/24/22 0837 91 2 (201)    02/24/22 0600 91 4 (201 5)    02/24/22 0100 91 2 (201 06)            Nutrition       Diet Orders   (From admission, onward)             Start     Ordered    02/25/22 0349  Diet Cardiovascular;  Sodium 2 GM; Consistent Carbohydrate Diet Level 2 (5 carb servings/75 grams CHO/meal)  Diet effective now        References:    Nutrtion Support Algorithm Enteral vs  Parenteral   Question Answer Comment   Diet Type Cardiovascular    Cardiac Sodium 2 GM    Other Restriction(s): Consistent Carbohydrate Diet Level 2 (5 carb servings/75 grams CHO/meal)    RD to adjust diet per protocol?  Yes        02/25/22 0348    02/24/22 1100  Room Service  Once        Question:  Type of Service  Answer:  Room Service-Appropriate    02/24/22 1059                  Active Medications  Scheduled Meds:  Current Facility-Administered Medications   Medication Dose Route Frequency Provider Last Rate    acetaminophen  975 mg Per G Tube Q6H PRN Melodyty Frankel, CRNP      albuterol  2 5 mg Nebulization Q4H PRN Patty Frankel, CRNP      amiodarone  200 mg Oral Daily With Breakfast Melodyty Frankel, CRNP      apixaban  5 mg Oral BID Melodyty Frankel, CRNP      atorvastatin  40 mg Oral Daily Melody Frankel, CRNP      budesonide  0 5 mg Nebulization Q12H Roz Spironello V, CRNP      chlorhexidine  15 mL Mouth/Throat Q12H [address] Melody Frankel, CRNP      famotidine  20 mg Oral BID Melody Frankel, CRNP      furosemide  40 mg Intravenous BID (diuretic) Melodyty Frankel, CRNP      insulin lispro  1-6 Units Subcutaneous HS Melody Frankel, CRNP      insulin lispro  2-12 Units Subcutaneous TID AC Melodyty Frankel, CRNP      ipratropium  0 5 mg Nebulization TID Patty Frankel, CRNP      levalbuterol  1 25 mg Nebulization TID Patty Frankel, CRNP      LORazepam  0 5 mg Oral BID PRN Patty Frankel, CRNP      losartan  50 mg Oral Daily Marcelino Martínez, CRNP      melatonin  6 mg Oral HS Melody Frankel, CRNP      metoprolol tartrate  25 mg Oral Q12H [address] Melody Frankel, CRNP      ondansetron  4 mg Intravenous Q6H PRN Melodyty Frankel, CRNP      polyethylene glycol  17 g Oral Daily Melody Frankel, CRNP      pregabalin  75 mg Oral Daily Melody Frankel, CRNP      QUEtiapine  25 mg Oral HS Melodyty Frankel, CRNP  senna-docusate sodium  1 tablet Oral HS NEELAM Caicedo      sodium chloride  4 mL Nebulization Q6H NEELAM Atkinson      ticagrelor  90 mg Oral Q12H [address] NEELAM Caicedo       Continuous Infusions:     PRN Meds:   acetaminophen, 975 mg, Q6H PRN  albuterol, 2 5 mg, Q4H PRN  LORazepam, 0 5 mg, BID PRN  ondansetron, 4 mg, Q6H PRN        Invasive Devices Review  Invasive Devices  Report    Peripheral Intravenous Line            Peripheral IV 02/23/22 Right Antecubital 2 days    Peripheral IV 02/23/22 Right Wrist 2 days          Drain            External Urinary Catheter 1 day          Airway            Surgical Airway 1 day                Rationale for remaining devices: none   ---------------------------------------------------------------------------------------  Advance Directive and Living Will:      Power of :    POLST:    ---------------------------------------------------------------------------------------  Care Time Delivered:   No Critical Care time spent       Willy Mendoza PA-C      Portions of the record may have been created with voice recognition software  Occasional wrong word or "sound a like" substitutions may have occurred due to the inherent limitations of voice recognition software    Read the chart carefully and recognize, using context, where substitutions have occurred

## 2022-02-26 NOTE — ASSESSMENT & PLAN NOTE
· Significant cardiac history with multiple recent hospitalizations  Had prolonged hospitalization at [facility] secondary to STEMI 10/22/21 s/p PATRICA to mid LCx complicated by cardiogenic shock and cardiac arrest secondary to VT x 2  Was d/c to rehab with T&P  · Presented to [facility] on 1/1/2022 from [place] [address] with cardiac arrest  She had ROSC pre hospital after AED shock x1  Seen by cardiology and did not feel arrest was ischemia-driven  Recommended ICD placement for secondary prevention  Patient's intervention deferred in setting of infected R  Lung bullae  Now s/p treatment x10D with doxycycline  · Discharged from [facility] on [facility] 2/10 with plan for elective ICD at 4 weeks  Denies any shocks delivered since vest initiation  · Records show Amiodarone 200mg PO BID starting 11/23     · Continue amio 200mg PO daily   · Continuous cardiac monitoring, no arrhythmias since admission   · Goal K>4, Mg>2

## 2022-02-27 LAB
ANION GAP SERPL CALCULATED.3IONS-SCNC: 8 MMOL/L (ref 4–13)
BUN SERPL-MCNC: 14 MG/DL (ref 5–25)
CALCIUM SERPL-MCNC: 8.1 MG/DL (ref 8.3–10.1)
CHLORIDE SERPL-SCNC: 107 MMOL/L (ref 100–108)
CO2 SERPL-SCNC: 28 MMOL/L (ref 21–32)
CREAT SERPL-MCNC: 0.86 MG/DL (ref 0.6–1.3)
ERYTHROCYTE [DISTWIDTH] IN BLOOD BY AUTOMATED COUNT: 18.9 % (ref 11.6–15.1)
GFR SERPL CREATININE-BSD FRML MDRD: 76 ML/MIN/1.73SQ M
GLUCOSE SERPL-MCNC: 173 MG/DL (ref 65–140)
GLUCOSE SERPL-MCNC: 188 MG/DL (ref 65–140)
GLUCOSE SERPL-MCNC: 229 MG/DL (ref 65–140)
GLUCOSE SERPL-MCNC: 259 MG/DL (ref 65–140)
GLUCOSE SERPL-MCNC: 264 MG/DL (ref 65–140)
HCT VFR BLD AUTO: 26.7 % (ref 34.8–46.1)
HGB BLD-MCNC: 8 G/DL (ref 11.5–15.4)
MAGNESIUM SERPL-MCNC: 2.2 MG/DL (ref 1.6–2.6)
MCH RBC QN AUTO: 25.5 PG (ref 26.8–34.3)
MCHC RBC AUTO-ENTMCNC: 30 G/DL (ref 31.4–37.4)
MCV RBC AUTO: 85 FL (ref 82–98)
PLATELET # BLD AUTO: 330 THOUSANDS/UL (ref 149–390)
PMV BLD AUTO: 10.9 FL (ref 8.9–12.7)
POTASSIUM SERPL-SCNC: 4.5 MMOL/L (ref 3.5–5.3)
RBC # BLD AUTO: 3.14 MILLION/UL (ref 3.81–5.12)
SODIUM SERPL-SCNC: 143 MMOL/L (ref 136–145)
WBC # BLD AUTO: 8.23 THOUSAND/UL (ref 4.31–10.16)

## 2022-02-27 PROCEDURE — 82948 REAGENT STRIP/BLOOD GLUCOSE: CPT

## 2022-02-27 PROCEDURE — 83735 ASSAY OF MAGNESIUM: CPT | Performed by: NURSE PRACTITIONER

## 2022-02-27 PROCEDURE — 94640 AIRWAY INHALATION TREATMENT: CPT

## 2022-02-27 PROCEDURE — 99232 SBSQ HOSP IP/OBS MODERATE 35: CPT | Performed by: INTERNAL MEDICINE

## 2022-02-27 PROCEDURE — 80048 BASIC METABOLIC PNL TOTAL CA: CPT | Performed by: NURSE PRACTITIONER

## 2022-02-27 PROCEDURE — 99231 SBSQ HOSP IP/OBS SF/LOW 25: CPT | Performed by: INTERNAL MEDICINE

## 2022-02-27 PROCEDURE — 85027 COMPLETE CBC AUTOMATED: CPT | Performed by: NURSE PRACTITIONER

## 2022-02-27 PROCEDURE — 94760 N-INVAS EAR/PLS OXIMETRY 1: CPT

## 2022-02-27 RX ADMIN — FUROSEMIDE 40 MG: 10 INJECTION, SOLUTION INTRAMUSCULAR; INTRAVENOUS at 17:05

## 2022-02-27 RX ADMIN — TICAGRELOR 90 MG: 90 TABLET ORAL at 20:19

## 2022-02-27 RX ADMIN — AMIODARONE HYDROCHLORIDE 200 MG: 200 TABLET ORAL at 07:51

## 2022-02-27 RX ADMIN — ATORVASTATIN CALCIUM 40 MG: 40 TABLET, FILM COATED ORAL at 11:00

## 2022-02-27 RX ADMIN — INSULIN LISPRO 6 UNITS: 100 INJECTION, SOLUTION INTRAVENOUS; SUBCUTANEOUS at 12:10

## 2022-02-27 RX ADMIN — FAMOTIDINE 20 MG: 40 POWDER, FOR SUSPENSION ORAL at 17:08

## 2022-02-27 RX ADMIN — FUROSEMIDE 40 MG: 10 INJECTION, SOLUTION INTRAMUSCULAR; INTRAVENOUS at 08:57

## 2022-02-27 RX ADMIN — INSULIN LISPRO 2 UNITS: 100 INJECTION, SOLUTION INTRAVENOUS; SUBCUTANEOUS at 21:28

## 2022-02-27 RX ADMIN — LEVALBUTEROL HYDROCHLORIDE 1.25 MG: 1.25 SOLUTION, CONCENTRATE RESPIRATORY (INHALATION) at 07:48

## 2022-02-27 RX ADMIN — SODIUM CHLORIDE SOLN NEBU 3% 4 ML: 3 NEBU SOLN at 19:18

## 2022-02-27 RX ADMIN — LOSARTAN POTASSIUM 50 MG: 50 TABLET, FILM COATED ORAL at 10:58

## 2022-02-27 RX ADMIN — IPRATROPIUM BROMIDE 0.5 MG: 0.5 SOLUTION RESPIRATORY (INHALATION) at 19:18

## 2022-02-27 RX ADMIN — IPRATROPIUM BROMIDE 0.5 MG: 0.5 SOLUTION RESPIRATORY (INHALATION) at 13:44

## 2022-02-27 RX ADMIN — CHLORHEXIDINE GLUCONATE 0.12% ORAL RINSE 15 ML: 1.2 LIQUID ORAL at 20:19

## 2022-02-27 RX ADMIN — APIXABAN 5 MG: 5 TABLET, FILM COATED ORAL at 17:08

## 2022-02-27 RX ADMIN — BUDESONIDE 0.5 MG: 0.5 INHALANT ORAL at 19:18

## 2022-02-27 RX ADMIN — FAMOTIDINE 20 MG: 40 POWDER, FOR SUSPENSION ORAL at 11:35

## 2022-02-27 RX ADMIN — APIXABAN 5 MG: 5 TABLET, FILM COATED ORAL at 11:00

## 2022-02-27 RX ADMIN — LEVALBUTEROL HYDROCHLORIDE 1.25 MG: 1.25 SOLUTION, CONCENTRATE RESPIRATORY (INHALATION) at 13:44

## 2022-02-27 RX ADMIN — INSULIN LISPRO 6 UNITS: 100 INJECTION, SOLUTION INTRAVENOUS; SUBCUTANEOUS at 17:05

## 2022-02-27 RX ADMIN — INSULIN LISPRO 2 UNITS: 100 INJECTION, SOLUTION INTRAVENOUS; SUBCUTANEOUS at 07:51

## 2022-02-27 RX ADMIN — SENNOSIDES AND DOCUSATE SODIUM 1 TABLET: 50; 8.6 TABLET ORAL at 21:30

## 2022-02-27 RX ADMIN — QUETIAPINE FUMARATE 25 MG: 25 TABLET ORAL at 21:30

## 2022-02-27 RX ADMIN — BUDESONIDE 0.5 MG: 0.5 INHALANT ORAL at 07:48

## 2022-02-27 RX ADMIN — METOPROLOL TARTRATE 25 MG: 25 TABLET, FILM COATED ORAL at 20:19

## 2022-02-27 RX ADMIN — LORAZEPAM 0.5 MG: 0.5 TABLET ORAL at 20:20

## 2022-02-27 RX ADMIN — IPRATROPIUM BROMIDE 0.5 MG: 0.5 SOLUTION RESPIRATORY (INHALATION) at 07:48

## 2022-02-27 RX ADMIN — LEVALBUTEROL HYDROCHLORIDE 1.25 MG: 1.25 SOLUTION, CONCENTRATE RESPIRATORY (INHALATION) at 19:18

## 2022-02-27 RX ADMIN — METOPROLOL TARTRATE 25 MG: 25 TABLET, FILM COATED ORAL at 10:58

## 2022-02-27 RX ADMIN — CHLORHEXIDINE GLUCONATE 0.12% ORAL RINSE 15 ML: 1.2 LIQUID ORAL at 11:00

## 2022-02-27 RX ADMIN — SODIUM CHLORIDE SOLN NEBU 3% 4 ML: 3 NEBU SOLN at 07:48

## 2022-02-27 RX ADMIN — Medication 6 MG: at 21:30

## 2022-02-27 RX ADMIN — SODIUM CHLORIDE SOLN NEBU 3% 4 ML: 3 NEBU SOLN at 13:44

## 2022-02-27 RX ADMIN — LORAZEPAM 0.5 MG: 0.5 TABLET ORAL at 10:57

## 2022-02-27 RX ADMIN — TICAGRELOR 90 MG: 90 TABLET ORAL at 10:59

## 2022-02-27 RX ADMIN — PREGABALIN 75 MG: 75 CAPSULE ORAL at 10:58

## 2022-02-27 NOTE — PROGRESS NOTES
Progress Note - Pulmonary   Benjamín Miu 55 y o  female MRN: [id number]  Unit/Bed#: [address] [place] [address] Encounter: [id number]    Assessment:  1  Acute hypoxic respiratory failure in setting of chronic trach collar use; Suspected to be likely due to ongoing HF with mixed EF exacerbation  Currently has been diuresing well (net 7 L negative since admission) with ongoing lasix 40 IV BID as per cardiology recommendations  She is saturating well on 35% FIO2 through trach collar  Secretions are minimal      2  Suspected COPD  She is currently on xopenex, budesonide and ipratropium via nebs  Will recommend to continue  Low suspicion for active exacerbation at this time  3  Chronic trach collar  Currently has 6 Shiley cuffed tracheostomy tube  Will need to be transitioned eventually to cuffless when ready for discharge  4  Tracheal stenosis  Maintain tracheostomy tube  5  Moderate Pulm HTN  PASP of 54 on recent ECHO  Continue ongoing management, could be secondary to tracheal stenosis  6  Afib with chronic diastolic CHF  Currently diuresing well  HR now in sinus rhythm  7  Anemia  Continue rx as per primary team     8  Lifevest, possibly for secondary prophylaxis after cardiac arrest earlier  ICD planning as per cardiology team      Chief Complaint:   SOB    Subjective:   Patient seen and examined at bedside  Patient is not able to verbalize however she is able to communicate through writing and expressions  She expresses that she is not having any discomfort at present  Reports slight improvement in overall shortness of breath and leg swelling  Objective:     Vitals: Blood pressure 118/86, pulse 71, temperature 98 4 °F (36 9 °C), resp  rate 16, height 5' 10" (1 778 m), weight 88 1 kg (194 lb 3 6 oz), SpO2 93 %  ,Body mass index is 27 87 kg/m²        Intake/Output Summary (Last 24 hours) at 2/27/2022 1319  Last data filed at 2/27/2022 1001  Gross per 24 hour   Intake --   Output 2100 ml   Net -2100 ml Invasive Devices  Report    Peripheral Intravenous Line            Peripheral IV 02/27/22 Right Forearm <1 day          Drain            External Urinary Catheter 3 days          Airway            Surgical Airway 2 days                Physical Exam:   General: Comfortable on chair  Head: Normocephalic   Neck: Supple  Back: Symmetric   RS:  Trace bibasilar crackles  CVS:  RRR   Abd: Soft, non-tender  Ext:  Improving bilateral pitting edema  Pulses: 2+ and symmetric all extremities  Skin: Skin color normal  Neuro:  AAO x3, no tremors, no rigidity, no gross neurological deficits  Psych: pleasant, no SI/HI       Labs: I have personally reviewed pertinent lab results  , ABG: No results found for: PHART, CMC0EKL, PO2ART, VZU4QTK, W1NPVUVE, BEART, SOURCE, BNP: No results found for: BNP, CBC:   Lab Results   Component Value Date    WBC 8 23 02/27/2022    HGB 8 0 (L) 02/27/2022    HCT 26 7 (L) 02/27/2022    MCV 85 02/27/2022     02/27/2022    MCH 25 5 (L) 02/27/2022    MCHC 30 0 (L) 02/27/2022    RDW 18 9 (H) 02/27/2022    MPV 10 9 02/27/2022   , CMP:   Lab Results   Component Value Date    SODIUM 143 02/27/2022    K 4 5 02/27/2022     02/27/2022    CO2 28 02/27/2022    BUN 14 02/27/2022    CREATININE 0 86 02/27/2022    CALCIUM 8 1 (L) 02/27/2022    EGFR 76 02/27/2022   , PT/INR: No results found for: PT, INR, Troponin: No results found for: TROPONINI  Imaging and other studies: I have personally reviewed pertinent reports     and I have personally reviewed pertinent films in PACS

## 2022-02-27 NOTE — PLAN OF CARE
Problem: PAIN - ADULT  Goal: Verbalizes/displays adequate comfort level or baseline comfort level  Description: Interventions:  - Encourage patient to monitor pain and request assistance  - Assess pain using appropriate pain scale  - Administer analgesics based on type and severity of pain and evaluate response  - Implement non-pharmacological measures as appropriate and evaluate response  - Consider cultural and social influences on pain and pain management  - Notify physician/advanced practitioner if interventions unsuccessful or patient reports new pain  Outcome: Progressing     Problem: INFECTION - ADULT  Goal: Absence or prevention of progression during hospitalization  Description: INTERVENTIONS:  - Assess and monitor for signs and symptoms of infection  - Monitor lab/diagnostic results  - Monitor all insertion sites, i e  indwelling lines, tubes, and drains  - Monitor endotracheal if appropriate and nasal secretions for changes in amount and color  - [place] appropriate cooling/warming therapies per order  - Administer medications as ordered  - Instruct and encourage patient and family to use good hand hygiene technique  - Identify and instruct in appropriate isolation precautions for identified infection/condition  Outcome: Progressing

## 2022-02-27 NOTE — PROGRESS NOTES
Progress Note - Cardiology   [facility]     Sylvia Ugarte 55 y o  female MRN: [id number]  : 1965  Unit/Bed#: [address] [place] [address] Encounter: [id number]    EKG / Monitor: Personally reviewed     Sinus rhythm at 71/Min     ASSESSMENT:  Acute hypoxic respiratory failure  Could have been secondary to acute on chronic DHF with atrial fibrillation   Now maintaining sinus rhythm  Rule out pulmonary pathologies  Has chronic tracheostomy with subglottic stenosis and RVSP of 55 mmHg       Normal troponins    Pro NT      TTE, 2022:  EF 05%-->96%, RV systolic pressure 54 mmHg     S/p tracheostomy  History of subglottic stenosis      CAD, S/p STEMI and PCI of mid circumflex and balloon angioplasty of Om2      S/p VT arrest, on life vest pending ICD implantation after resolution of sepsis      PAF, now in sinus rhythm     Diabetes mellitus     History of hypertension     Anxiety disorder     Anemia, hemoglobin 8 0     Noncompliant     RECOMMENDATIONS:  Continue Eliquis, amiodarone, atorvastatin, Brilinta, metoprolol and losartan  Continue IV Lasix   Monitor weight, I/O and electrolytes    On LifeVest at home     EP eval with ICD implantation if indicated              Subjective / Objective:     Review of Systems   Awake and alert  Denies any unusual dyspnea or chest pain  Agitated since dietary refused to give her pancakes due to high sodium content  Vitals: Blood pressure 118/86, pulse 71, temperature 98 4 °F (36 9 °C), resp  rate 16, height 5' 10" (1 778 m), weight 88 1 kg (194 lb 3 6 oz), SpO2 93 %  Vitals:    22 0600 22 0559   Weight: 90 3 kg (199 lb 1 2 oz) 88 1 kg (194 lb 3 6 oz)     Body mass index is 27 87 kg/m²    BP Readings from Last 3 Encounters:   22 118/86   22 149/82   22 155/75     Orthostatic Blood Pressures      Most Recent Value   Blood Pressure 118/86 filed at 2022 2867   Patient Position - Orthostatic VS Sitting filed at 2022 2102        I/O       02/25 0701  02/26 0700 02/26 0701  02/27 0700 02/27 0701  02/28 0700    P  O  1000      I V  (mL/kg) 40 (0 4)      IV Piggyback       Total Intake(mL/kg) 1040 (11 5)      Urine (mL/kg/hr) 2850 (1 3) 2100 (1)     Stool 0      Total Output 2850 2100     Net -1810 -2100            Unmeasured Urine Occurrence 1 x      Unmeasured Stool Occurrence 2 x          Invasive Devices  Report    Peripheral Intravenous Line            Peripheral IV 02/27/22 Right Forearm <1 day          Drain            External Urinary Catheter 3 days          Airway            Surgical Airway 2 days                  Intake/Output Summary (Last 24 hours) at 2/27/2022 1039  Last data filed at 2/27/2022 0601  Gross per 24 hour   Intake --   Output 1100 ml   Net -1100 ml         Physical Exam:   Physical Exam    Neurologic:  Alert & oriented x 3,  no new focal deficits noted   Constitutional:  Well developed, well nourished,  With no acute distress  Eyes:  PERRL, conjunctiva normal   HENT:  Atraumatic, external ears normal, nose normal,   NECK:  Tracheostomy present  Respiratory:  Bilateral air entry, mostly clear to auscultation  Cardiovascular: Regular S1-S2 with a I/VI ejection systolic murmur  No pericardial rub or gallop audible  GI:  Soft, nondistended, audible bowel sounds, nontender, no hepatosplenomegaly appreciated  Musculoskeletal:  No tenderness, no deformities  Extremities:  No significant edema     Psychiatric:   agitated with dietary restrictions             Medications/ Allergies:     Current Facility-Administered Medications   Medication Dose Route Frequency Provider Last Rate    acetaminophen  975 mg Per G Tube Q6H PRN Roz Spironello V, CRNP      albuterol  2 5 mg Nebulization Q4H PRN Roz Spironello V, CRNP      amiodarone  200 mg Oral Daily With Breakfast Roz Spironello V, CRNP      apixaban  5 mg Oral BID Roz Spironello V, CRNP      atorvastatin  40 mg Oral Daily [facility] Spironello V, CRNP      budesonide  0 5 mg Nebulization Q12H Roz Iriso V, CRNP      chlorhexidine  15 mL Mouth/Throat Q12H [facility] Roz Camnello V, CRNP      famotidine  20 mg Oral BID [facility] Citlalli V, CRNP      furosemide  40 mg Intravenous BID (diuretic) Roz Spironello V, CRNP      insulin lispro  1-6 Units Subcutaneous HS Roz Iriso V, CRNP      insulin lispro  2-12 Units Subcutaneous TID AC Roz Simmonso V, CRNP      ipratropium  0 5 mg Nebulization TID [facility] Citlalli V, CRNP      levalbuterol  1 25 mg Nebulization TID [facility] V, CRNP      LORazepam  0 5 mg Oral BID PRN Roz Iriso V, CRNP      losartan  50 mg Oral Daily Roz Camcarloso V, CRNP      melatonin  6 mg Oral HS Roz Iriso V, CRNP      metoprolol tartrate  25 mg Oral Q12H [facility] Roz Simmonso V, CRNP      ondansetron  4 mg Intravenous Q6H PRN Roz Iriso V, CRNP      polyethylene glycol  17 g Oral Daily Roz Camnello V, CRNP      pregabalin  75 mg Oral Daily Roz Iriso V, CRNP      QUEtiapine  25 mg Oral HS Roz Iriso V, CRNP      senna-docusate sodium  1 tablet Oral HS Roz Iriso V, CRNP      sodium chloride  4 mL Nebulization TID Rylee Revankar, DO      ticagrelor  90 mg Oral Q12H [facility] Roz Camcarloso V, CRNP       acetaminophen, 975 mg, Q6H PRN  albuterol, 2 5 mg, Q4H PRN  LORazepam, 0 5 mg, BID PRN  ondansetron, 4 mg, Q6H PRN      Allergies   Allergen Reactions    Metformin Diarrhea    Clonidine Rash     Patch            Labs:   Troponins:      CBC with diff:  Results from last 7 days   Lab Units 02/27/22  0545 02/26/22  0737 02/25/22  0551 02/24/22  0518 02/23/22 2038 02/21/22  1301   WBC Thousand/uL 8 23 8 09 8 09 8 60 9 86 9 27   HEMOGLOBIN g/dL 8 0* 8 0* 7 9* 7 7* 10 4* 9 7*   HEMATOCRIT % 26 7* 27 4* 27 0* 25 4* 34 0* 32 3*   MCV fL 85 85 85 85 83 84   PLATELETS Thousands/uL 330 305 287 288 392* 307 MCH pg 25 5* 24 8* 24 9* 25 8* 25 5* 25 2*   MCHC g/dL 30 0* 29 2* 29 3* 30 3* 30 6* 30 0*   RDW % 18 9* 18 8* 19 3* 18 7* 18 7* 18 6*   MPV fL 10 9 10 8 11 0 11 2 11 6 11 3   NRBC AUTO /100 WBCs  --   --   --  0 0 0       CMP:  Results from last 7 days   Lab Units 02/27/22  0545 02/26/22  0737 02/25/22  0551 02/24/22  0518 02/23/22 2038 02/21/22  1301   SODIUM mmol/L 143 143 145 144 145 143   POTASSIUM mmol/L 4 5 3 7 3 9 3 1* 3 3* 3 5   CHLORIDE mmol/L 107 105 110* 111* 108 109*   CO2 mmol/L 28 28 27 25 24 22   ANION GAP mmol/L 8 10 8 8 13 12   BUN mg/dL 14 15 14 12 13 13   CREATININE mg/dL 0 86 0 87 0 90 0 81 0 91 0 72   CALCIUM mg/dL 8 1* 8 1* 8 2* 7 9* 9 1 8 8   AST U/L  --   --   --  12 14* 10*   ALT U/L  --   --   --  18 13 10   ALK PHOS U/L  --   --   --  83 92 1 86 5   TOTAL PROTEIN g/dL  --   --   --  6 0* 7 6 7 0   ALBUMIN g/dL  --   --   --  2 6* 3 9 3 6   TOTAL BILIRUBIN mg/dL  --   --   --  0 54 0 69 0 87   EGFR ml/min/1 73sq m 76 75 72 82 71 94       Magnesium:  Results from last 7 days   Lab Units 02/27/22  0545 02/26/22  0737 02/25/22  0551 02/24/22  0518 02/23/22 2038   MAGNESIUM mg/dL 2 2 2 0 2 3 1 9 1 5*     Coags:  Results from last 7 days   Lab Units 02/23/22 2038   PTT seconds 25   INR  1 32*     TSH:    No components found for: TSH3  Lipid Profile:    Hgb A1c:    NT-proBNP: No results for input(s): NTBNP in the last 72 hours  Imaging & Testing   I have personally reviewed pertinent reports  XR chest 1 view portable    Result Date: 2/24/2022  Narrative: CHEST INDICATION:   sob  COMPARISON:  Chest radiograph 2/21/2022  EXAM PERFORMED/VIEWS:  XR CHEST PORTABLE FINDINGS:  Lesser extent tracheostomy unchanged  Heart shadow is enlarged but unchanged from prior exam  Stable mild pulmonary edema  Stable small left pleural effusion  No pneumothorax  Osseous structures appear within normal limits for patient age  Impression: Stable mild pulmonary edema and small left pleural effusion  Workstation performed: [id number]     XR chest portable    Result Date: 2/21/2022  Narrative: CHEST INDICATION:   SOB, trach  COMPARISON:  Most recent is chest x-ray dated February 12, 2022  EXAM PERFORMED/VIEWS:  XR CHEST PORTABLE FINDINGS: Cardiomediastinal silhouette appears unremarkable  Moderate pulmonary edema, similar to the prior study  Life best and tracheostomy unchanged  Probable bilateral left greater than right pleural effusions  Osseous structures appear within normal limits for patient age  Impression: No acute cardiopulmonary disease  Workstation performed: [id number]     XR chest portable    Result Date: 2/13/2022  Narrative: CHEST INDICATION:   SOB  COMPARISON:  Chest radiograph from 11/10/2021 and chest CT from 11/14/2021  EXAM PERFORMED/VIEWS:  XR CHEST PORTABLE FINDINGS:  Tracheostomy  Cardiomediastinal silhouette appears unremarkable  Life vest  Moderate pulmonary edema  No effusion or pneumothorax  Osseous structures appear within normal limits for patient age  Impression: Moderate pulmonary edema  Workstation performed: [id number]     CTA ED chest PE Study    Result Date: 2/23/2022  Narrative: CTA - CHEST WITH IV CONTRAST - PULMONARY ANGIOGRAM INDICATION:   Shortness of breath, + dimer, trach, CHF  COMPARISON: CT of the chest abdomen pelvis on November 14, 2021  TECHNIQUE: CTA examination of the chest was performed using angiographic technique according to a protocol specifically tailored to evaluate for pulmonary embolism  Axial, sagittal, and coronal 2D reformatted images were created from the source data and  submitted for interpretation  In addition, coronal 3D MIP postprocessing was performed on the acquisition scanner  Radiation dose length product (DLP) for this visit:  468 6 mGy-cm     This examination, like all CT scans performed in the [facility], was performed utilizing techniques to minimize radiation dose exposure, including the use of iterative reconstruction and automated exposure control  IV Contrast:  100 mL of iohexol (OMNIPAQUE)  FINDINGS: PULMONARY ARTERIAL TREE:  No pulmonary embolus is seen  LUNGS: Tracheostomy tube in place  Hypoventilatory changes of lungs  Mild diffuse groundglass opacity within the lungs may be due to pulmonary edema  There are patchy opacities at the bilateral lower lobe bases and within the posterior left upper lobe  which may be due to pneumonia in the appropriate clinical setting  PLEURA:  Small bilateral pleural effusions  HEART/GREAT VESSELS:  Cardiomegaly  No pericardial effusion  Coronary artery calcifications  No thoracic aortic aneurysm  MEDIASTINUM AND REECE:  Mediastinal lymph nodes measure up to 9 mm in short axis  CHEST WALL AND LOWER NECK:   Unremarkable  VISUALIZED STRUCTURES IN THE UPPER ABDOMEN:  Unremarkable  OSSEOUS STRUCTURES: No acute fracture  Redemonstrated old sternal and bilateral rib fractures  Impression: 1  No evidence of pulmonary embolism  2   Mild diffuse ground glass opacities in the lungs may be due to pulmonary edema  Small bilateral pleural effusions  Cardiomegaly  Correlate for heart failure  3   Patchy opacities at the bilateral lower lobe bases and within the posterior left upper lobe which may be due to atelectasis and/or pneumonia in the appropriate clinical setting  Workstation performed: [id number]     Echo complete w/ contrast if indicated    Result Date: 2/24/2022  Narrative: Denny Washington  Left Ventricle: Left ventricular cavity size is normal  Wall thickness is moderately increased  Systolic function is normal   Estimated ejection fraction is 50%  There is moderate hypokinesis of the inferior and the basal anterolateral walls  Diastolic function is normal  There is moderate concentric hypertrophy    Left Atrium: The atrium is mildly dilated    Right Atrium: The atrium is mildly dilated    Tricuspid Valve: There is mild regurgitation   The right ventricular systolic pressure is mildly to moderately elevated at 54 mm Hg    Aorta: Aortic root is mildly dilated  Consider CTA for more accurate evaluation    Pericardium: There is a trivial pericardial effusion  There is no echocardiographic evidence of tamponade  There is a left pleural effusion    Changes include: Ejection fraction has improved  RV systolic pressure is elevated  EKG / Monitor: Personally reviewed  Sinus rhythm at 71 per minute    Cardiac testing:   No results found for this or any previous visit  Dr Giovanni Bowling MD, [facility]      "This note has been constructed using a voice recognition system  Therefore there may be syntax, spelling, and/or grammatical errors   Please call if you have any questions  "

## 2022-02-27 NOTE — PROGRESS NOTES
[facility] Internal Medicine Progress Note  Patient: Matias Cortez 55 y o  female   MRN: [id number]  PCP: Aundrea Mcclain MD  Unit/Bed#: [address] [place] [address] Encounter: [id number]  Date Of Visit: 02/27/22    Problem List:    Principal Problem:    Acute on chronic respiratory failure with hypoxia ([facility] [address] )  Active Problems:    Acute on chronic combined systolic and diastolic heart failure (HCC)    A-fib ([facility])    CAD (coronary artery disease)    Anxiety    Gastroesophageal reflux disease with esophagitis    Hyperlipidemia associated with type 2 diabetes mellitus ([facility] [address] )    Hypertension    Tobacco abuse    Uncontrolled type 2 diabetes mellitus with complication, with long-term current use of insulin ([facility])    History of Ventricular tachycardia ([facility] )    History of Cardiac arrest ([place] [address] )    Subglottic stenosis      Assessment & Plan:    Acute on chronic combined systolic and diastolic heart failure (HCC)  Assessment & Plan  Wt Readings from Last 3 Encounters:   02/27/22 88 1 kg (194 lb 3 6 oz)   11/23/21 87 5 kg (192 lb 14 4 oz)   11/04/21 91 3 kg (201 lb 4 5 oz)      History of ischemic cardiomyopathy , awaiting AICD  Echocardiogram 10/21-EF 40%, akinesis of inferior and anterolateral LV  CTA chest 2/23-bilateral effusion and bilateral opacity secondary to pulmonary edema   Echocardiogram-02/24/2022-EF 50%, moderate hypokinesis of inferior and basal anterolateral wall  Improving with diuresis   · Continue IV Lasix  · Monitor intake output   · Daily weight   · Cardiology following, input appreciated        * Acute on chronic respiratory failure with hypoxia (HCC)  Assessment & Plan  History of respiratory failure status post tracheostomy and PEG tube placement    On 10 L FiO2 via trach collar at baseline   Presented to ED 2/22-which illness of breath noted to have CHF, admission was advised but patient requested to leave   Presented [facility] to [facility] on 02/23-with acute shortness of breath   Noted to have SpO2 89-92% on arrival, placed on PS 10/6/50% after trach exchange to cuffed trach  CTA of the chest-no pulmonary embolism, diffuse ground-glass opacities may be due to pulmonary edema  Small bilateral pleural effusion  Cardiomegaly  SARS-CoV-2 influenza PCR negative   Respiratory status improved with dialysis   · Currently on trach collar on 35% FiO2  · Pulmonary following     CAD (coronary artery disease)  Assessment & Plan  History of STEMI 5, hospitalized at [facility] 10/22-11/23  Status post mid LCX culprit lesion stent at bifurcation of OM2  Mild nonobstructive disease of LAD and RCA   · Continue Brilinta, statin, Eliquis      A-fib (HCC)  Assessment & Plan  On amiodarone and Eliquis    Subglottic stenosis  Assessment & Plan  History of difficult EGD placement prior to respiratory arrest on 01/14/22  Status post tracheostomy placement  · Continue trach care   · Follow-up speech therapy     History of Cardiac arrest [facility])  Assessment & Plan  As above    History of Ventricular tachycardia [facility])  Assessment & Plan  Extensive cardiac history with multiple recent hospitalization   Status post prolonged hospitalization at [facility] secondary to STEMI 10/22/2021 status post PATRICA to mid circumflex complicated by cardiogenic shock and cardiac arrest secondary to VT x2  Patient was discharged to rehab with tracheostomy and PEG tube  Subsequently hospitalized at [facility] on 01/01/2022 from [facility] with cardiac arrest   Pre-hospital ROSC after AED shock x1  Recommended ICD placement by Cardiology but deferred due to infected right lung bullae, status post antibiotic treatment   Subsequently patient was discharged from [facility] on life vest on 02/10 with plan for elective ICD placement in 4 weeks    Patient denies any subsequent shocks/events  · Continue amiodarone  · Follow up Cardiology recommendation   Monitor electrolytes    Uncontrolled type 2 diabetes mellitus with complication, with long-term current use of insulin [facility])  Assessment & Plan  Lab Results   Component Value Date    HGBA1C 6 6 (H) 2022       Recent Labs     22  0700 22  1128 22  1547 22   POCGLU 173* 259* 264* 229*       Blood Sugar Average: Last 72 hrs:  (P) 965 [id number]     Patient was discharged to rehab on Basaglar 8 units daily and Apidra 2 units t i d   Will resume Lantus 8 units and lispro 2 units before meals   Continue sliding scale   Monitor    Hypertension  Assessment & Plan  Home medication Cozaar, Lasix, metoprolol   Stable on current meds  · Continue metoprolol, losartan at current dose  · Continue diuresis   · Monitor    Hyperlipidemia associated with type 2 diabetes mellitus ([facility] [address] )  Assessment & Plan  Continue statin    Anxiety  Assessment & Plan  On lorazepam p r n  VTE Pharmacologic Prophylaxis: VTE Score: 7 Moderate Risk (Score 3-4) - Pharmacological DVT Prophylaxis Ordered: apixaban (Eliquis)  Patient Centered Rounds: I performed bedside rounds with nursing staff today  Discussions with Specialists or Other Care Team Provider:  Yes    Education and Discussions with Family / Patient: Patient declined call to   Time Spent for Care: 45 minutes  More than 50% of total time spent on counseling and coordination of care as described above  Current Length of Stay: 3 day(s)  Current Patient Status: Inpatient   Certification Statement: The patient will continue to require additional inpatient hospital stay due to Respiratory failure  Discharge Plan: Anticipate discharge in 48-72 hrs to home with home services      Code Status: Level 1 - Full Code    Subjective:   Sitting up in chair   Reports that her breathing is improving   Denies any chest pain dizziness or palpitation  Reports minimal cough    Objective:     Vitals:   Temp (24hrs), Av 4 °F (36 9 °C), Min:98 3 °F (36 8 °C), Max:98 5 °F (36 9 °C)    Temp:  [98 3 °F (36 8 °C)-98 5 °F (36 9 °C)] 98 4 °F (36 9 °C)  HR:  [65-77] 71  Resp:  [16-20] 16  BP: (107-139)/(70-86) 118/86  SpO2:  [93 %-100 %] 93 %  Body mass index is 27 87 kg/m²  Input and Output Summary (last 24 hours): Intake/Output Summary (Last 24 hours) at 2/27/2022 0900  Last data filed at 2/27/2022 0601  Gross per 24 hour   Intake --   Output 2100 ml   Net -2100 ml       Physical Exam:   Physical Exam  Constitutional:       General: She is not in acute distress  Appearance: She is ill-appearing (Chronically)  HENT:      Head: Normocephalic and atraumatic  Neck:      Comments: Tracheostomy, trach collar  Cardiovascular:      Rate and Rhythm: Normal rate  Pulmonary:      Effort: Pulmonary effort is normal  No respiratory distress  Breath sounds: Rales present  No wheezing  Abdominal:      General: Bowel sounds are normal  There is no distension  Palpations: Abdomen is soft  Tenderness: There is no abdominal tenderness  There is no guarding or rebound  Musculoskeletal:      Right lower leg: Edema present  Left lower leg: Edema present  Skin:     General: Skin is warm and dry  Findings: No rash  Neurological:      General: No focal deficit present  Mental Status: She is alert  Additional Data:     Labs:  Results from last 7 days   Lab Units 02/27/22  0545 02/25/22  0551 02/24/22  0518   WBC Thousand/uL 8 23   < > 8 60   HEMOGLOBIN g/dL 8 0*   < > 7 7*   HEMATOCRIT % 26 7*   < > 25 4*   PLATELETS Thousands/uL 330   < > 288   NEUTROS PCT %  --   --  71   LYMPHS PCT %  --   --  19   MONOS PCT %  --   --  7   EOS PCT %  --   --  3    < > = values in this interval not displayed       Results from last 7 days   Lab Units 02/27/22  0545 02/25/22  0551 02/24/22  0518   SODIUM mmol/L 143   < > 144   POTASSIUM mmol/L 4 5   < > 3 1*   CHLORIDE mmol/L 107   < > 111*   CO2 mmol/L 28   < > 25   BUN mg/dL 14   < > 12   CREATININE mg/dL 0 86   < > 0 81   ANION GAP mmol/L 8   < > 8   CALCIUM mg/dL 8 1*   < > 7 9*   ALBUMIN g/dL  -- --  2 6*   TOTAL BILIRUBIN mg/dL  --   --  0 54   ALK PHOS U/L  --   --  83   ALT U/L  --   --  18   AST U/L  --   --  12   GLUCOSE RANDOM mg/dL 188*   < > 197*    < > = values in this interval not displayed  Results from last 7 days   Lab Units 02/23/22 2038   INR  1 32*     Results from last 7 days   Lab Units 02/27/22  0700 02/26/22  2114 02/26/22  1141 02/26/22  0747 02/25/22  2048 02/25/22  1601 02/25/22  1107 02/25/22  0757 02/25/22  0551 02/24/22  2331 02/24/22  1709 02/24/22  1638   POC GLUCOSE mg/dl 173* 205* 174* 168* 176* 163* 190* 131 163* 186* 143* 147*         Results from last 7 days   Lab Units 02/25/22  0551 02/24/22  0518 02/23/22 2220 02/23/22 2038   LACTIC ACID mmol/L  --   --  1 6 2 4*   PROCALCITONIN ng/ml 0 13 0 10  --   --        Lines/Drains:  Invasive Devices  Report    Peripheral Intravenous Line            Peripheral IV 02/23/22 Right Antecubital 3 days    Peripheral IV 02/23/22 Right Wrist 3 days          Drain            External Urinary Catheter 2 days          Airway            Surgical Airway 2 days                  Telemetry:  Telemetry Orders (From admission, onward)             48 Hour Telemetry Monitoring  Continuous x 48 hours        References:    Telemetry Guidelines   Question:  Reason for 48 Hour Telemetry  Answer:  Acute Decompensated CHF (continuous diuretic infusion or total diuretic dose > 200 mg daily, associated electrolyte derangement, ionotropic drip, history of ventricular arrhythmia, or new EF <35%)                 Telemetry Reviewed: Normal Sinus Rhythm  Indication for Continued Telemetry Use: Lifevest (remains on tele entire hospital stay)             Imaging: Reviewed radiology reports from this admission including: chest xray    Recent Cultures (last 7 days):   Results from last 7 days   Lab Units 02/24/22  0521 02/24/22  0519 02/23/22 2038   BLOOD CULTURE  No Growth at 48 hrs  No Growth at 48 hrs  No Growth at 72 hrs  No Growth at 24 hrs         Last 24 Hours Medication List:   Current Facility-Administered Medications   Medication Dose Route Frequency Provider Last Rate    acetaminophen  975 mg Per G Tube Q6H PRN Maria D Moyer V, NEELAM      albuterol  2 5 mg Nebulization Q4H PRN Roz Wright V, NEELAM      amiodarone  200 mg Oral Daily With Breakfast Roz Wright V, NEELAM      apixaban  5 mg Oral BID Roz Wright V, CRNP      atorvastatin  40 mg Oral Daily Roz Wright V, CRJAYLIN      budesonide  0 5 mg Nebulization Q12H Roz Simmonso JOLEEN, CRNP      chlorhexidine  15 mL Mouth/Throat Q12H [address] Roz Simmonso JOLEEN, CRJAYLIN      famotidine  20 mg Oral BID Roz Wright V, CRNP      furosemide  40 mg Intravenous BID (diuretic) Roz Wright V, NEELAM      insulin lispro  1-6 Units Subcutaneous HS Roz Simmonso V, CRNP      insulin lispro  2-12 Units Subcutaneous TID AC Roz Wright V, CRJAYLIN      ipratropium  0 5 mg Nebulization TID Maria D Moyer V, CRNP      levalbuterol  1 25 mg Nebulization TID Maria D Moyer V, TRISHNP      LORazepam  0 5 mg Oral BID PRN Roz Simmonso JOLEEN, CRNP      losartan  50 mg Oral Daily Roz Simmonso JOLEEN, CRNP      melatonin  6 mg Oral HS Roz Simmonso JOLEEN, CRNP      metoprolol tartrate  25 mg Oral Q12H [address] Roz Wright V, CRJAYLIN      ondansetron  4 mg Intravenous Q6H PRN Roz Simmonso JOLEEN, NEELAM      polyethylene glycol  17 g Oral Daily Roz Simmonso V, CRNP      pregabalin  75 mg Oral Daily Roz Simmonso V, CRNP      QUEtiapine  25 mg Oral HS Roz Simmonso JOLEEN, CRNP      senna-docusate sodium  1 tablet Oral HS Roz Simmonso JOLEEN, CRNP      sodium chloride  4 mL Nebulization TID Rylee Amos DO      ticagrelor  90 mg Oral Q12H [address] NEELAM Atkinson          Today, Patient Was Seen By: Karen Coronado MD    ** Please Note: "This note has been constructed using a voice recognition system  Therefore there may be syntax, spelling, and/or grammatical errors   Please call if you have any questions  "**

## 2022-02-28 ENCOUNTER — APPOINTMENT (INPATIENT)
Dept: RADIOLOGY | Facility: HOSPITAL | Age: 57
DRG: 291 | End: 2022-02-28
Payer: MEDICARE

## 2022-02-28 PROBLEM — I50.43 ACUTE ON CHRONIC COMBINED SYSTOLIC AND DIASTOLIC HEART FAILURE (HCC): Status: ACTIVE | Noted: 2022-02-24

## 2022-02-28 LAB
ANION GAP SERPL CALCULATED.3IONS-SCNC: 8 MMOL/L (ref 4–13)
BASOPHILS # BLD AUTO: 0.02 THOUSANDS/ΜL (ref 0–0.1)
BASOPHILS NFR BLD AUTO: 0 % (ref 0–1)
BUN SERPL-MCNC: 21 MG/DL (ref 5–25)
CALCIUM SERPL-MCNC: 8.2 MG/DL (ref 8.3–10.1)
CHLORIDE SERPL-SCNC: 106 MMOL/L (ref 100–108)
CO2 SERPL-SCNC: 28 MMOL/L (ref 21–32)
CREAT SERPL-MCNC: 1.02 MG/DL (ref 0.6–1.3)
EOSINOPHIL # BLD AUTO: 0.36 THOUSAND/ΜL (ref 0–0.61)
EOSINOPHIL NFR BLD AUTO: 4 % (ref 0–6)
ERYTHROCYTE [DISTWIDTH] IN BLOOD BY AUTOMATED COUNT: 18.6 % (ref 11.6–15.1)
GFR SERPL CREATININE-BSD FRML MDRD: 62 ML/MIN/1.73SQ M
GLUCOSE SERPL-MCNC: 155 MG/DL (ref 65–140)
GLUCOSE SERPL-MCNC: 159 MG/DL (ref 65–140)
GLUCOSE SERPL-MCNC: 198 MG/DL (ref 65–140)
GLUCOSE SERPL-MCNC: 240 MG/DL (ref 65–140)
GLUCOSE SERPL-MCNC: 262 MG/DL (ref 65–140)
HCT VFR BLD AUTO: 28.5 % (ref 34.8–46.1)
HGB BLD-MCNC: 8.5 G/DL (ref 11.5–15.4)
IMM GRANULOCYTES # BLD AUTO: 0.04 THOUSAND/UL (ref 0–0.2)
IMM GRANULOCYTES NFR BLD AUTO: 1 % (ref 0–2)
LYMPHOCYTES # BLD AUTO: 1.96 THOUSANDS/ΜL (ref 0.6–4.47)
LYMPHOCYTES NFR BLD AUTO: 23 % (ref 14–44)
MAGNESIUM SERPL-MCNC: 2.1 MG/DL (ref 1.6–2.6)
MCH RBC QN AUTO: 25.2 PG (ref 26.8–34.3)
MCHC RBC AUTO-ENTMCNC: 29.8 G/DL (ref 31.4–37.4)
MCV RBC AUTO: 85 FL (ref 82–98)
MONOCYTES # BLD AUTO: 0.7 THOUSAND/ΜL (ref 0.17–1.22)
MONOCYTES NFR BLD AUTO: 8 % (ref 4–12)
NEUTROPHILS # BLD AUTO: 5.55 THOUSANDS/ΜL (ref 1.85–7.62)
NEUTS SEG NFR BLD AUTO: 64 % (ref 43–75)
NRBC BLD AUTO-RTO: 0 /100 WBCS
PLATELET # BLD AUTO: 334 THOUSANDS/UL (ref 149–390)
PMV BLD AUTO: 10.5 FL (ref 8.9–12.7)
POTASSIUM SERPL-SCNC: 4.1 MMOL/L (ref 3.5–5.3)
RBC # BLD AUTO: 3.37 MILLION/UL (ref 3.81–5.12)
SODIUM SERPL-SCNC: 142 MMOL/L (ref 136–145)
WBC # BLD AUTO: 8.63 THOUSAND/UL (ref 4.31–10.16)

## 2022-02-28 PROCEDURE — 99232 SBSQ HOSP IP/OBS MODERATE 35: CPT | Performed by: INTERNAL MEDICINE

## 2022-02-28 PROCEDURE — 92526 ORAL FUNCTION THERAPY: CPT

## 2022-02-28 PROCEDURE — 94760 N-INVAS EAR/PLS OXIMETRY 1: CPT

## 2022-02-28 PROCEDURE — 97530 THERAPEUTIC ACTIVITIES: CPT

## 2022-02-28 PROCEDURE — 94640 AIRWAY INHALATION TREATMENT: CPT

## 2022-02-28 PROCEDURE — 85025 COMPLETE CBC W/AUTO DIFF WBC: CPT | Performed by: INTERNAL MEDICINE

## 2022-02-28 PROCEDURE — 80048 BASIC METABOLIC PNL TOTAL CA: CPT | Performed by: INTERNAL MEDICINE

## 2022-02-28 PROCEDURE — 71045 X-RAY EXAM CHEST 1 VIEW: CPT

## 2022-02-28 PROCEDURE — 83735 ASSAY OF MAGNESIUM: CPT | Performed by: INTERNAL MEDICINE

## 2022-02-28 PROCEDURE — 82948 REAGENT STRIP/BLOOD GLUCOSE: CPT

## 2022-02-28 RX ORDER — INSULIN GLARGINE 100 [IU]/ML
8 INJECTION, SOLUTION SUBCUTANEOUS
Status: DISCONTINUED | OUTPATIENT
Start: 2022-02-28 | End: 2022-03-02

## 2022-02-28 RX ADMIN — INSULIN LISPRO 2 UNITS: 100 INJECTION, SOLUTION INTRAVENOUS; SUBCUTANEOUS at 21:11

## 2022-02-28 RX ADMIN — FUROSEMIDE 40 MG: 10 INJECTION, SOLUTION INTRAMUSCULAR; INTRAVENOUS at 16:36

## 2022-02-28 RX ADMIN — AMIODARONE HYDROCHLORIDE 200 MG: 200 TABLET ORAL at 08:02

## 2022-02-28 RX ADMIN — IPRATROPIUM BROMIDE 0.5 MG: 0.5 SOLUTION RESPIRATORY (INHALATION) at 07:32

## 2022-02-28 RX ADMIN — QUETIAPINE FUMARATE 25 MG: 25 TABLET ORAL at 21:02

## 2022-02-28 RX ADMIN — SODIUM CHLORIDE SOLN NEBU 3% 4 ML: 3 NEBU SOLN at 07:32

## 2022-02-28 RX ADMIN — BUDESONIDE 0.5 MG: 0.5 INHALANT ORAL at 07:32

## 2022-02-28 RX ADMIN — SODIUM CHLORIDE SOLN NEBU 3% 4 ML: 3 NEBU SOLN at 19:17

## 2022-02-28 RX ADMIN — INSULIN LISPRO 6 UNITS: 100 INJECTION, SOLUTION INTRAVENOUS; SUBCUTANEOUS at 11:49

## 2022-02-28 RX ADMIN — IPRATROPIUM BROMIDE 0.5 MG: 0.5 SOLUTION RESPIRATORY (INHALATION) at 13:04

## 2022-02-28 RX ADMIN — CHLORHEXIDINE GLUCONATE 0.12% ORAL RINSE 15 ML: 1.2 LIQUID ORAL at 21:01

## 2022-02-28 RX ADMIN — LORAZEPAM 0.5 MG: 0.5 TABLET ORAL at 08:02

## 2022-02-28 RX ADMIN — METOPROLOL TARTRATE 25 MG: 25 TABLET, FILM COATED ORAL at 08:02

## 2022-02-28 RX ADMIN — INSULIN GLARGINE 8 UNITS: 100 INJECTION, SOLUTION SUBCUTANEOUS at 21:05

## 2022-02-28 RX ADMIN — INSULIN LISPRO 2 UNITS: 100 INJECTION, SOLUTION INTRAVENOUS; SUBCUTANEOUS at 08:01

## 2022-02-28 RX ADMIN — FUROSEMIDE 40 MG: 10 INJECTION, SOLUTION INTRAMUSCULAR; INTRAVENOUS at 07:54

## 2022-02-28 RX ADMIN — Medication 6 MG: at 21:01

## 2022-02-28 RX ADMIN — TICAGRELOR 90 MG: 90 TABLET ORAL at 21:01

## 2022-02-28 RX ADMIN — INSULIN LISPRO 2 UNITS: 100 INJECTION, SOLUTION INTRAVENOUS; SUBCUTANEOUS at 11:49

## 2022-02-28 RX ADMIN — FAMOTIDINE 20 MG: 40 POWDER, FOR SUSPENSION ORAL at 17:45

## 2022-02-28 RX ADMIN — PREGABALIN 75 MG: 75 CAPSULE ORAL at 08:02

## 2022-02-28 RX ADMIN — ATORVASTATIN CALCIUM 40 MG: 40 TABLET, FILM COATED ORAL at 08:02

## 2022-02-28 RX ADMIN — BUDESONIDE 0.5 MG: 0.5 INHALANT ORAL at 19:17

## 2022-02-28 RX ADMIN — LEVALBUTEROL HYDROCHLORIDE 1.25 MG: 1.25 SOLUTION, CONCENTRATE RESPIRATORY (INHALATION) at 13:04

## 2022-02-28 RX ADMIN — FAMOTIDINE 20 MG: 40 POWDER, FOR SUSPENSION ORAL at 08:03

## 2022-02-28 RX ADMIN — TICAGRELOR 90 MG: 90 TABLET ORAL at 08:01

## 2022-02-28 RX ADMIN — LOSARTAN POTASSIUM 50 MG: 50 TABLET, FILM COATED ORAL at 08:01

## 2022-02-28 RX ADMIN — LEVALBUTEROL HYDROCHLORIDE 1.25 MG: 1.25 SOLUTION, CONCENTRATE RESPIRATORY (INHALATION) at 07:32

## 2022-02-28 RX ADMIN — CHLORHEXIDINE GLUCONATE 0.12% ORAL RINSE 15 ML: 1.2 LIQUID ORAL at 08:01

## 2022-02-28 RX ADMIN — APIXABAN 5 MG: 5 TABLET, FILM COATED ORAL at 08:02

## 2022-02-28 RX ADMIN — APIXABAN 5 MG: 5 TABLET, FILM COATED ORAL at 17:45

## 2022-02-28 RX ADMIN — METOPROLOL TARTRATE 25 MG: 25 TABLET, FILM COATED ORAL at 21:01

## 2022-02-28 RX ADMIN — IPRATROPIUM BROMIDE 0.5 MG: 0.5 SOLUTION RESPIRATORY (INHALATION) at 19:17

## 2022-02-28 RX ADMIN — INSULIN LISPRO 2 UNITS: 100 INJECTION, SOLUTION INTRAVENOUS; SUBCUTANEOUS at 16:35

## 2022-02-28 RX ADMIN — INSULIN LISPRO 2 UNITS: 100 INJECTION, SOLUTION INTRAVENOUS; SUBCUTANEOUS at 16:36

## 2022-02-28 RX ADMIN — LEVALBUTEROL HYDROCHLORIDE 1.25 MG: 1.25 SOLUTION, CONCENTRATE RESPIRATORY (INHALATION) at 19:17

## 2022-02-28 RX ADMIN — LORAZEPAM 0.5 MG: 0.5 TABLET ORAL at 21:12

## 2022-02-28 RX ADMIN — SODIUM CHLORIDE SOLN NEBU 3% 4 ML: 3 NEBU SOLN at 13:04

## 2022-02-28 NOTE — ASSESSMENT & PLAN NOTE
History of STEMI 5, hospitalized at [facility] 10/22-11/23  Status post mid LCX culprit lesion stent at bifurcation of OM2    Mild nonobstructive disease of LAD and RCA   · Continue Brilinta, statin, Eliquis

## 2022-02-28 NOTE — PROGRESS NOTES
Progress Note - Cardiology   [facility]     Danyel Zabala 55 y o  female MRN: [id number]  : 1965  Unit/Bed#: [address] [place] [address] Encounter: [id number]    Assessment and Plan:     81-year-old lady with history of coronary artery disease status post inferior lateral wall infarction with prolonged hospital stay in October and 2021 at [facility] and later on had a prolonged stay at [facility]   At 2021 her EF was around 40% with grade 2 diastolic dysfunction  She had also history of atrial fibrillation  Current EF now around 50% with mild MR  In the hospital set up she had episodes of VT and cardiac arrest and at 1 point was recommended to have ICD placed however it was not placed as she was found to have a right bolus lesion in the lung/abscess and was on antibiotics  She has been on systemic anticoagulation therapy for atrial fibrillation  She still feels that her tracheostomy tube is small and she had a subglottis stenosis  Repeat echo Doppler from [facility] reviewed  Her CT scan has shown some evidence of emphysema  There has been some issues of noncompliant  She has been treated and followed by Pulmonary and she was initially in the ICU  1  Acute hypoxic respiratory failure now much improved    2  Acute on chronic systolic and diastolic heart failure with current EF around 50% with mild MR and moderate pulmonary hypertension  She has wall motion abnormality involving inferior lateral wall consistent with her old lateral infarction    3  Coronary artery disease status post cardiac catheterization  Initially admitted with STEMI in at 2021 and had a stent placed in mid circumflex into OM1  OM2 was ballooned but not stented  Later on she has a cardiac arrest and had a repeat catheterization found to have apical LAD complete occlusion was felt to be small to be intervened      4  Paroxysmal atrial fibrillation currently in sinus rhythm with right bundle branch block  Continue Eliquis she is on amiodarone  At some point we will decrease the dose to amiodarone 100 mg daily    5  History of ventricular tachycardia  Cardiology note from [place] reviewed  She had cardiac history with multiple recent hospitalization in [address] [place] [address] as well as at [facility]   She has no episodes of VT here she is on amiodarone  Will need EP evaluation for ICD  She was discharged from [facility] with on life vest in February 10 with planned to have elective ICD  She would prefer to have ICD done in [facility] system  No ICD shocks  Plan  Continue beta-blockers, continue IV Lasix today will decrease it to once a day from tomorrow  Continue Lipitor  For now continue amiodarone  We discussed with EP about plan about ICD in the meantime she will continue to use defibrillator vest   Follow-up electrolytes keep potassium around 4 and magnesium around 2  She is being managed for COPD and her tracheostomy by Pulmonary  Subjective / Objective:   Patient seen and evaluated  She says she feels well  Occasionally feel shortness of breath  She feels her tracheostomy tube is small  This was conveyed to medical team and Pulmonary  Denies any chest pain  Vitals: Blood pressure 124/77, pulse 62, temperature 98 8 °F (37 1 °C), resp  rate 18, height 5' 10" (1 778 m), weight 85 kg (187 lb 6 3 oz), SpO2 100 %  Vitals:    02/27/22 0559 02/28/22 0600   Weight: 88 1 kg (194 lb 3 6 oz) 85 kg (187 lb 6 3 oz)     Body mass index is 26 89 kg/m²  BP Readings from Last 3 Encounters:   02/28/22 124/77   02/23/22 149/82   02/21/22 155/75     Orthostatic Blood Pressures      Most Recent Value   Blood Pressure 124/77 filed at 02/28/2022 1149   Patient Position - Orthostatic VS Lying filed at 02/28/2022 0727        I/O       02/26 0701 02/27 0700 02/27 0701 02/28 0700 02/28 0701 03/01 0700    P  O        I V  (mL/kg) Total Intake(mL/kg)       Urine (mL/kg/hr) 2100 (1) 1700 (0 8)     Stool       Total Output 2100 1700     Net -2100 -1700                Invasive Devices  Report    Peripheral Intravenous Line            Peripheral IV 02/27/22 Right Forearm 1 day          Airway            Surgical Airway 3 days                  Intake/Output Summary (Last 24 hours) at 2/28/2022 1409  Last data filed at 2/28/2022 0601  Gross per 24 hour   Intake --   Output 700 ml   Net -700 ml         Physical Exam:   Physical Exam    Neurologic:  Alert & oriented x 3,  no focal deficits noted   Constitutional:  Well developed, well nourished,  With no acute distress  Eyes:  PERRL, conjunctiva normal   HENT:  Atraumatic, external ears normal, nose normal, tracheostomy is in place with trach collar  NECK: Normal range of motion, no tenderness, neck is supple , No JVP  Respiratory:  Bilateral air entry mostly clear to auscultation with few rhonchi  Cardiovascular: S1-S2 regular with a 2/6 ejection systolic murmur  S4 is heard  GI:  Soft, nondistended, normal bowel sounds, nontender, no hepatosplenomegaly appreciated  Musculoskeletal:  No tenderness, no deformities      Extremities:  No edema  Psychiatric:  Speech and behavior appropriate             Medications/ Allergies:     Current Facility-Administered Medications   Medication Dose Route Frequency Provider Last Rate    acetaminophen  975 mg Per G Tube Q6H PRN Roz Spironello V, CRNP      albuterol  2 5 mg Nebulization Q4H PRN Roz Spironello V, CRNP      amiodarone  200 mg Oral Daily With Breakfast Roz Spironello V, CRNP      apixaban  5 mg Oral BID Roz Spironello V, CRNP      atorvastatin  40 mg Oral Daily Roz Spironello V, CRNP      budesonide  0 5 mg Nebulization Q12H Roz Spironello V, CRNP      chlorhexidine  15 mL Mouth/Throat Q12H [address] Roz Spironello V, CRNP      famotidine  20 mg Oral BID Roz Spironello V, CRNP      furosemide  40 mg Intravenous BID (diuretic) Wilder Eth V, CRNP      insulin glargine  8 Units Subcutaneous HS Benito Clark MD      insulin lispro  1-6 Units Subcutaneous HS Roz Simmonso V, CRNP      insulin lispro  2 Units Subcutaneous TID With Meals Benito Clark MD      insulin lispro  2-12 Units Subcutaneous TID AC Roz Camcarloso V, CRNP      ipratropium  0 5 mg Nebulization TID Wilder Eth V, CRNP      levalbuterol  1 25 mg Nebulization TID Wilder Eth V, CRNP      LORazepam  0 5 mg Oral BID PRN Roz Spironello V, CRNP      losartan  50 mg Oral Daily Rozaristides Simmonso V, CRNP      melatonin  6 mg Oral HS Roz Simmonso V, CRNP      metoprolol tartrate  25 mg Oral Q12H [address] Roz Spironello V, CRNP      ondansetron  4 mg Intravenous Q6H PRN Roz Spironello V, CRNP      polyethylene glycol  17 g Oral Daily Roz Spirocarloso V, CRNP      pregabalin  75 mg Oral Daily Roz Spironello V, CRNP      QUEtiapine  25 mg Oral HS Roz Simmonso V, CRNP      senna-docusate sodium  1 tablet Oral HS Rozchuck Simmonso V, CRNP      sodium chloride  4 mL Nebulization TID Rylee Revankar, DO      ticagrelor  90 mg Oral Q12H [address] Rozchuck Simmonso V, CRNP       acetaminophen, 975 mg, Q6H PRN  albuterol, 2 5 mg, Q4H PRN  LORazepam, 0 5 mg, BID PRN  ondansetron, 4 mg, Q6H PRN      Allergies   Allergen Reactions    Metformin Diarrhea    Clonidine Rash     Patch          Labs:   Troponins:  Results from last 7 days   Lab Units 02/23/22  2220   HSTNI D2 ng/L -2         CBC with diff:  Results from last 7 days   Lab Units 02/28/22  0510 02/27/22  0545 02/26/22  0737 02/25/22  0551 02/24/22  0518 02/23/22  2038   WBC Thousand/uL 8 63 8 23 8 09 8 09 8 60 9 86   HEMOGLOBIN g/dL 8 5* 8 0* 8 0* 7 9* 7 7* 10 4*   HEMATOCRIT % 28 5* 26 7* 27 4* 27 0* 25 4* 34 0*   MCV fL 85 85 85 85 85 83   PLATELETS Thousands/uL 334 330 305 287 288 392*   MCH pg 25 2* 25 5* 24 8* 24 9* 25 8* 25 5*   MCHC g/dL 29 8* 30 0* 29 2* 29 3* 30 3* 30 6*   RDW % 18 6* 18 9* 18 8* 19 3* 18 7* 18 7*   MPV fL 10 5 10 9 10 8 11 0 11 2 11 6   NRBC AUTO /100 WBCs 0  --   --   --  0 0       CMP:  Results from last 7 days   Lab Units 02/28/22  0510 02/27/22  0545 02/26/22  0737 02/25/22  0551 02/24/22 0518 02/23/22 2038   SODIUM mmol/L 142 143 143 145 144 145   POTASSIUM mmol/L 4 1 4 5 3 7 3 9 3 1* 3 3*   CHLORIDE mmol/L 106 107 105 110* 111* 108   CO2 mmol/L 28 28 28 27 25 24   ANION GAP mmol/L 8 8 10 8 8 13   BUN mg/dL 21 14 15 14 12 13   CREATININE mg/dL 1 02 0 86 0 87 0 90 0 81 0 91   CALCIUM mg/dL 8 2* 8 1* 8 1* 8 2* 7 9* 9 1   AST U/L  --   --   --   --  12 14*   ALT U/L  --   --   --   --  18 13   ALK PHOS U/L  --   --   --   --  83 92 1   TOTAL PROTEIN g/dL  --   --   --   --  6 0* 7 6   ALBUMIN g/dL  --   --   --   --  2 6* 3 9   TOTAL BILIRUBIN mg/dL  --   --   --   --  0 54 0 69   EGFR ml/min/1 73sq m 62 76 75 72 82 71       Magnesium:  Results from last 7 days   Lab Units 02/28/22  0510 02/27/22  0545 02/26/22  0737 02/25/22  0551 02/24/22  0518 02/23/22 2038   MAGNESIUM mg/dL 2 1 2 2 2 0 2 3 1 9 1 5*     Coags:  Results from last 7 days   Lab Units 02/23/22 2038   PTT seconds 25   INR  1 32*         Imaging & Testing   I have personally reviewed pertinent reports  XR chest 1 view portable    Result Date: 2/24/2022  Narrative: CHEST INDICATION:   sob  COMPARISON:  Chest radiograph 2/21/2022  EXAM PERFORMED/VIEWS:  XR CHEST PORTABLE FINDINGS:  Lesser extent tracheostomy unchanged  Heart shadow is enlarged but unchanged from prior exam  Stable mild pulmonary edema  Stable small left pleural effusion  No pneumothorax  Osseous structures appear within normal limits for patient age  Impression: Stable mild pulmonary edema and small left pleural effusion   Workstation performed: [id number]       CTA ED chest PE Study    Result Date: 2/23/2022  Narrative: CTA - CHEST WITH IV CONTRAST - PULMONARY ANGIOGRAM INDICATION:   Shortness of breath, + dimer, trach, CHF  COMPARISON: CT of the chest abdomen pelvis on November 14, 2021  TECHNIQUE: CTA examination of the chest was performed using angiographic technique according to a protocol specifically tailored to evaluate for pulmonary embolism  Axial, sagittal, and coronal 2D reformatted images were created from the source data and  submitted for interpretation  In addition, coronal 3D MIP postprocessing was performed on the acquisition scanner  Radiation dose length product (DLP) for this visit:  468 6 mGy-cm   This examination, like all CT scans performed in the [facility], was performed utilizing techniques to minimize radiation dose exposure, including the use of iterative reconstruction and automated exposure control  IV Contrast:  100 mL of iohexol (OMNIPAQUE)  FINDINGS: PULMONARY ARTERIAL TREE:  No pulmonary embolus is seen  LUNGS: Tracheostomy tube in place  Hypoventilatory changes of lungs  Mild diffuse groundglass opacity within the lungs may be due to pulmonary edema  There are patchy opacities at the bilateral lower lobe bases and within the posterior left upper lobe  which may be due to pneumonia in the appropriate clinical setting  PLEURA:  Small bilateral pleural effusions  HEART/GREAT VESSELS:  Cardiomegaly  No pericardial effusion  Coronary artery calcifications  No thoracic aortic aneurysm  MEDIASTINUM AND REECE:  Mediastinal lymph nodes measure up to 9 mm in short axis  CHEST WALL AND LOWER NECK:   Unremarkable  VISUALIZED STRUCTURES IN THE UPPER ABDOMEN:  Unremarkable  OSSEOUS STRUCTURES: No acute fracture  Redemonstrated old sternal and bilateral rib fractures  Impression: 1  No evidence of pulmonary embolism  2   Mild diffuse ground glass opacities in the lungs may be due to pulmonary edema  Small bilateral pleural effusions  Cardiomegaly  Correlate for heart failure   3   Patchy opacities at the bilateral lower lobe bases and within the posterior left upper lobe which may be due to atelectasis and/or pneumonia in the appropriate clinical setting  Workstation performed: [id number]     Echo complete w/ contrast if indicated    Result Date: 2/24/2022  Narrative: Daniela Karl  Left Ventricle: Left ventricular cavity size is normal  Wall thickness is moderately increased  Systolic function is normal   Estimated ejection fraction is 50%  There is moderate hypokinesis of the inferior and the basal anterolateral walls  Diastolic function is normal  There is moderate concentric hypertrophy    Left Atrium: The atrium is mildly dilated    Right Atrium: The atrium is mildly dilated    Tricuspid Valve: There is mild regurgitation  The right ventricular systolic pressure is mildly to moderately elevated at 54 mm Hg    Aorta: Aortic root is mildly dilated  Consider CTA for more accurate evaluation    Pericardium: There is a trivial pericardial effusion  There is no echocardiographic evidence of tamponade  There is a left pleural effusion    Changes include: Ejection fraction has improved  RV systolic pressure is elevated  EKG / Monitor: Personally reviewed  Admission EKG shows normal sinus rhythm rhythm right bundle branch block lateral wall infarction  Dr Chris Thomson MD [facility]      "This note has been constructed using a voice recognition system  Therefore there may be syntax, spelling, and/or grammatical errors   Please call if you have any questions  "

## 2022-02-28 NOTE — PHYSICAL THERAPY NOTE
PT TREATMENT     02/28/22 1015   Note Type   Note Type Treatment   Pain Assessment   Pain Assessment Tool 0-10   Pain Score No Pain   Restrictions/Precautions   Other Precautions O2;Fall Risk   General   Chart Reviewed Yes   Cognition   Overall Cognitive Status WFL   Subjective   Subjective "My legs are weak"   Bed Mobility   Supine to Sit 5  Supervision   Transfers   Sit to Stand 4  Minimal assistance   Stand to Sit 4  Minimal assistance   Stand pivot 4  Minimal assistance   Additional items   (with walker)   Ambulation/Elevation   Gait pattern   (recurvatum knee L, slow esequiel)   Gait Assistance 4  Minimal assist   Assistive Device Rolling walker   Distance 2x15 feet   Balance   Static Sitting Good   Dynamic Sitting Fair +   Static Standing Fair   Dynamic Standing Fair -   Activity Tolerance   Activity Tolerance Patient tolerated treatment well;Patient limited by fatigue  (Sp02 >95% on trach collar)   Assessment   Prognosis Good   Problem List Decreased strength;Decreased endurance; Impaired balance;Decreased mobility   Assessment Pt demonstrates slowly improving endurance and function  Recommend home PT and 24 hour supervision upon DC  The patient's [facility] Basic Mobility Inpatient Short Form Raw Score is 20  A Raw score of greater than 16 suggests the patient may benefit from discharge to home  Plan   Treatment/Interventions ADL retraining;Functional transfer training;LE strengthening/ROM; Elevations; Therapeutic exercise; Endurance training;Gait training;Bed mobility; Equipment eval/education;Patient/family training   Progress Progressing toward goals   PT Frequency Other (Comment)  (5w)   Recommendation   PT Discharge Recommendation Home with home health rehabilitation   Equipment Recommended   (pt has a walker and home O2)   [facility]   Turning in Bed Without Bedrails 4   Lying on Back to Sitting on Edge of Flat Bed 4   Moving Bed to Chair 3   Standing Up From Chair 3   Walk in Room 3   Climb 3-5 Stairs 2   Basic Mobility Inpatient Raw Score 19   Basic Mobility Standardized Score 42 48   Highest Level Of Mobility   JH-HLM Goal 6: Walk 10 steps or more   JH-HLM Highest Level of Mobility 6: Walk 10 steps or more   JH-HLM Goal Achieved Yes   Licensure   [place] License Number  [place]  [id number]

## 2022-02-28 NOTE — SPEECH THERAPY NOTE
SLP Progress Note    Patient Name: Amanda Lee  LPAAW'R Date: 2/28/2022    Subjective:  "I guess I need to wait until the tube is changed" referring to when she might be able to achieve some brief voicing with digital occlusion  Objective:  Pt was seen for f/u to speaking valve and swallowing evaluation  Pt was assessed with chocolate pudding and colored thin liquids (juice, milk)  Pt with prompt swallow with good laryngeal rise  No cough, throat clearing, change in O2 sat or s/s aspiration at the trach site  Re: communication, no voicing achieved with brief digital occlusion;  Minimal sound production noted with strong reflexive cough  Pt with lenghty h/o inability to tolerate speaking valve for beyond a min amount (eg 30-60 secs)  However, pt stated ability to speak "a little" with previous trach in place  Discussed the fact that current trach has cuff and likelihood of voicing (albiet it for only a few syllables at limited volume) will be improved with return to cuffless trach  (Pt able to demonstrate finger occlusion of trach    Assessment:  No s/s aspiration  Not appropriate for speaking valve use       Plan/Recommendations:   No additional SLP tx indicated at this time (signing off)    Nora Horn [address] [facility] [address] [phone]

## 2022-02-28 NOTE — ASSESSMENT & PLAN NOTE
Extensive cardiac history with multiple recent hospitalization   Status post prolonged hospitalization at [facility] secondary to STEMI 10/22/2021 status post PATRICA to mid circumflex complicated by cardiogenic shock and cardiac arrest secondary to VT x2  Patient was discharged to rehab with tracheostomy and PEG tube  Subsequently hospitalized at [facility] on 01/01/2022 from [facility] with cardiac arrest   Pre-hospital ROSC after AED shock x1  Recommended ICD placement by Cardiology but deferred due to infected right lung bullae, status post antibiotic treatment   Subsequently patient was discharged from [facility] on life vest on 02/10 with plan for elective ICD placement in 4 weeks    Patient denies any subsequent shocks/events  · Continue amiodarone  · Plan for outpatient EP followup as per Cardiology recs  · Monitor electrolytes

## 2022-02-28 NOTE — ASSESSMENT & PLAN NOTE
History of respiratory failure status post tracheostomy and PEG tube placement  On 10 L FiO2 via trach collar at baseline   Presented to ED 2/22 with shortness of breath noted, admission was advised but patient requested to leave   Presented again to [facility] ED on 02/23 with acute shortness of breath   Noted to have SpO2 89-92% on arrival, placed on PS 10/6/50% after trach exchange to cuffed trach  CTA of the chest - no pulmonary embolism, diffuse ground-glass opacities may be due to pulmonary edema  Small bilateral pleural effusion  Cardiomegaly   SARS-CoV-2 influenza PCR negative   Respiratory status improved with diuresis  · Currently on trach collar on 35% FiO2  · Chest x-ray with improvement of pulmonary edema   · Pulmonary and Cardiology  following

## 2022-02-28 NOTE — PLAN OF CARE
Problem: PAIN - ADULT  Goal: Verbalizes/displays adequate comfort level or baseline comfort level  Description: Interventions:  - Encourage patient to monitor pain and request assistance  - Assess pain using appropriate pain scale  - Administer analgesics based on type and severity of pain and evaluate response  - Implement non-pharmacological measures as appropriate and evaluate response  - Consider cultural and social influences on pain and pain management  - Notify physician/advanced practitioner if interventions unsuccessful or patient reports new pain  Outcome: Progressing     Problem: INFECTION - ADULT  Goal: Absence or prevention of progression during hospitalization  Description: INTERVENTIONS:  - Assess and monitor for signs and symptoms of infection  - Monitor lab/diagnostic results  - Monitor all insertion sites, i e  indwelling lines, tubes, and drains  - Monitor endotracheal if appropriate and nasal secretions for changes in amount and color  - [place] appropriate cooling/warming therapies per order  - Administer medications as ordered  - Instruct and encourage patient and family to use good hand hygiene technique  - Identify and instruct in appropriate isolation precautions for identified infection/condition  Outcome: Progressing

## 2022-02-28 NOTE — ASSESSMENT & PLAN NOTE
History of difficult EGD placement prior to respiratory arrest on 01/14/22  Status post tracheostomy placement  · Continue trach care   · Follow-up speech therapy   · Pulmonary follow-up

## 2022-02-28 NOTE — ASSESSMENT & PLAN NOTE
Lab Results   Component Value Date    HGBA1C 6 6 (H) 01/01/2022       Recent Labs     02/27/22  1547 02/27/22  1956 02/28/22  0723 02/28/22  1052   POCGLU 264* 229* 155* 262*       Blood Sugar Average: Last 72 hrs:  (P) 553 [id number]     Patient was discharged to rehab on Basaglar 8 units daily and Apidra 2 units t i d    Resumed Lantus 8 units and lispro 2 units before meals   Continue sliding scale

## 2022-02-28 NOTE — PROGRESS NOTES
[facility] Internal Medicine Progress Note  Patient: Deena Rome 55 y o  female   MRN: [id number]  PCP: Meghan Singleton MD  Unit/Bed#: [address] [place] [address] Encounter: [id number]  Date Of Visit: 02/28/22    Problem List:    Principal Problem:    Acute on chronic respiratory failure with hypoxia ([facility] )  Active Problems:    Acute on chronic combined systolic and diastolic heart failure (HCC)    A-fib ([place] [address] )    CAD (coronary artery disease)    Anxiety    Gastroesophageal reflux disease with esophagitis    Hyperlipidemia associated with type 2 diabetes mellitus ([place] [address] )    Hypertension    Tobacco abuse    Uncontrolled type 2 diabetes mellitus with complication, with long-term current use of insulin ([facility])    History of Ventricular tachycardia ([place] [address] )    History of Cardiac arrest ([place] [address] )    Subglottic stenosis      Assessment & Plan:    Acute on chronic combined systolic and diastolic heart failure (HCC)  Assessment & Plan  Wt Readings from Last 3 Encounters:   02/28/22 85 kg (187 lb 6 3 oz)   11/23/21 87 5 kg (192 lb 14 4 oz)   11/04/21 91 3 kg (201 lb 4 5 oz)      History of ischemic cardiomyopathy , awaiting AICD  Echocardiogram 10/21-EF 40%, akinesis of inferior and anterolateral LV  CTA chest 2/23-bilateral effusion and bilateral opacity secondary to pulmonary edema   Echocardiogram-02/24/2022-EF 50%, moderate hypokinesis of inferior and basal anterolateral wall  Improving with diuresis   · Continue IV Lasix  · Monitor intake output   · Daily weight   · Cardiology following, input appreciated        * Acute on chronic respiratory failure with hypoxia (HCC)  Assessment & Plan  History of respiratory failure status post tracheostomy and PEG tube placement    On 10 L FiO2 via trach collar at baseline   Presented to ED 2/22-which illness of breath noted to have CHF, admission was advised but patient requested to leave   Presented again to [facility] ED on 02/23-with acute shortness of breath   Noted to have SpO2 89-92% on arrival, placed on PS 10/6/50% after trach exchange to cuffed trach  CTA of the chest-no pulmonary embolism, diffuse ground-glass opacities may be due to pulmonary edema  Small bilateral pleural effusion  Cardiomegaly  SARS-CoV-2 influenza PCR negative   Respiratory status improved with diuresis  · Currently on trach collar on 35% FiO2  · Check chest x-ray  · Pulmonary following     CAD (coronary artery disease)  Assessment & Plan  History of STEMI 5, hospitalized at [facility] 10/22-11/23  Status post mid LCX culprit lesion stent at bifurcation of OM2  Mild nonobstructive disease of LAD and RCA   · Continue Brilinta, statin, Eliquis      A-fib (HCC)  Assessment & Plan  On amiodarone and Eliquis    Subglottic stenosis  Assessment & Plan  History of difficult EGD placement prior to respiratory arrest on 01/14/22  Status post tracheostomy placement  · Continue trach care   · Follow-up speech therapy   · Pulmonary follow-up, discussed with Pulmonary regarding trach change    History of Cardiac arrest [facility])  Assessment & Plan  As above    History of Ventricular tachycardia [facility])  Assessment & Plan  Extensive cardiac history with multiple recent hospitalization   Status post prolonged hospitalization at [facility] secondary to STEMI 10/22/2021 status post PATRICA to mid circumflex complicated by cardiogenic shock and cardiac arrest secondary to VT x2  Patient was discharged to rehab with tracheostomy and PEG tube  Subsequently hospitalized at [facility] on 01/01/2022 from [facility] with cardiac arrest   Pre-hospital ROSC after AED shock x1  Recommended ICD placement by Cardiology but deferred due to infected right lung bullae, status post antibiotic treatment   Subsequently patient was discharged from [facility] on life vest on 02/10 with plan for elective ICD placement in 4 weeks    Patient denies any subsequent shocks/events  · Continue amiodarone  · Follow up Cardiology recommendation regarding AICD placement  Monitor electrolytes    Uncontrolled type 2 diabetes mellitus with complication, with long-term current use of insulin [facility])  Assessment & Plan  Lab Results   Component Value Date    HGBA1C 6 6 (H) 01/01/2022       Recent Labs     02/27/22  1547 02/27/22  1956 02/28/22  0723 02/28/22  1052   POCGLU 264* 229* 155* 262*       Blood Sugar Average: Last 72 hrs:  (P) 891 [id number]     Patient was discharged to rehab on Basaglar 8 units daily and Apidra 2 units t i d  Resumed Lantus 8 units and lispro 2 units before meals   Continue sliding scale   Monitor    Hypertension  Assessment & Plan  Home medication Cozaar, Lasix, metoprolol   Stable on current meds  · Continue metoprolol, losartan at current dose  · Continue diuresis   · Monitor    Hyperlipidemia associated with type 2 diabetes mellitus ([facility] )  Assessment & Plan  Continue statin    Gastroesophageal reflux disease with esophagitis  Assessment & Plan  On Pepcid    Anxiety  Assessment & Plan  On lorazepam p r n  VTE Pharmacologic Prophylaxis: VTE Score: 7 Moderate Risk (Score 3-4) - Pharmacological DVT Prophylaxis Ordered: apixaban (Eliquis)  Patient Centered Rounds: I performed bedside rounds with nursing staff today  Discussions with Specialists or Other Care Team Provider:  Yes, Cardiology, Pulmonary    Education and Discussions with Family / Patient: Updated  (son) via phone  Time Spent for Care: 45 minutes  More than 50% of total time spent on counseling and coordination of care as described above  Current Length of Stay: 4 day(s)  Current Patient Status: Inpatient   Certification Statement: The patient will continue to require additional inpatient hospital stay due to Respiratory failure  Discharge Plan: Anticipate discharge in 48-72 hrs to home with home services      Code Status: Level 1 - Full Code    Subjective:   Sitting up in bed  Reported feeling short of breath last night  Feels that her tracheostomy is to small  Breathing is overall better today  Denies any pain    Objective:     Vitals:   Temp (24hrs), Av 3 °F (36 8 °C), Min:98 °F (36 7 °C), Max:98 8 °F (37 1 °C)    Temp:  [98 °F (36 7 °C)-98 8 °F (37 1 °C)] 98 2 °F (36 8 °C)  HR:  [67-74] 71  Resp:  [18-20] 20  BP: (110-144)/(66-90) 139/85  SpO2:  [84 %-100 %] 100 %  Body mass index is 26 89 kg/m²  Input and Output Summary (last 24 hours): Intake/Output Summary (Last 24 hours) at 2022 1056  Last data filed at 2022 0601  Gross per 24 hour   Intake --   Output 700 ml   Net -700 ml       Physical Exam:   Physical Exam  Constitutional:       General: She is not in acute distress  Appearance: She is ill-appearing (Chronically)  HENT:      Head: Normocephalic and atraumatic  Neck:      Comments: Tracheostomy in place, trach collar  Cardiovascular:      Rate and Rhythm: Normal rate  Pulmonary:      Effort: Pulmonary effort is normal  No respiratory distress  Breath sounds: Rales present  No wheezing  Comments: Diminished  Abdominal:      General: Bowel sounds are normal  There is no distension  Palpations: Abdomen is soft  Tenderness: There is no abdominal tenderness  There is no guarding or rebound  Musculoskeletal:      Right lower leg: Edema present  Left lower leg: Edema present  Comments: Improving lower extremity edema   Skin:     General: Skin is warm and dry  Findings: No rash  Neurological:      Mental Status: She is alert  Mental status is at baseline           Additional Data:     Labs:  Results from last 7 days   Lab Units 22  0510   WBC Thousand/uL 8 63   HEMOGLOBIN g/dL 8 5*   HEMATOCRIT % 28 5*   PLATELETS Thousands/uL 334   NEUTROS PCT % 64   LYMPHS PCT % 23   MONOS PCT % 8   EOS PCT % 4     Results from last 7 days   Lab Units 22  0510 22  0551 22  0518   SODIUM mmol/L 142   < > 144   POTASSIUM mmol/L 4 1   < > 3 1*   CHLORIDE mmol/L 106   < > 111*   CO2 mmol/L 28   < > 25   BUN mg/dL 21   < > 12   CREATININE mg/dL 1 02   < > 0 81   ANION GAP mmol/L 8   < > 8   CALCIUM mg/dL 8 2*   < > 7 9*   ALBUMIN g/dL  --   --  2 6*   TOTAL BILIRUBIN mg/dL  --   --  0 54   ALK PHOS U/L  --   --  83   ALT U/L  --   --  18   AST U/L  --   --  12   GLUCOSE RANDOM mg/dL 159*   < > 197*    < > = values in this interval not displayed  Results from last 7 days   Lab Units 02/23/22 2038   INR  1 32*     Results from last 7 days   Lab Units 02/28/22  0723 02/27/22  1956 02/27/22  1547 02/27/22  1128 02/27/22  0700 02/26/22  2114 02/26/22  1141 02/26/22  0747 02/25/22  2048 02/25/22  1601 02/25/22  1107 02/25/22  0757   POC GLUCOSE mg/dl 155* 229* 264* 259* 173* 205* 174* 168* 176* 163* 190* 131         Results from last 7 days   Lab Units 02/25/22  0551 02/24/22  0518 02/23/22 2220 02/23/22 2038   LACTIC ACID mmol/L  --   --  1 6 2 4*   PROCALCITONIN ng/ml 0 13 0 10  --   --        Lines/Drains:  Invasive Devices  Report    Peripheral Intravenous Line            Peripheral IV 02/27/22 Right Forearm 1 day          Airway            Surgical Airway 3 days                  Telemetry:  Telemetry Orders (From admission, onward)             48 Hour Telemetry Monitoring  Continuous x 48 hours        References:    Telemetry Guidelines   Question:  Reason for 48 Hour Telemetry  Answer:  Acute Decompensated CHF (continuous diuretic infusion or total diuretic dose > 200 mg daily, associated electrolyte derangement, ionotropic drip, history of ventricular arrhythmia, or new EF <35%)                 Telemetry Reviewed: Normal Sinus Rhythm  Indication for Continued Telemetry Use: Lifevest (remains on tele entire hospital stay)             Imaging: Reviewed radiology reports from this admission including: chest xray    Recent Cultures (last 7 days):   Results from last 7 days   Lab Units 02/24/22  0521 02/24/22  0519 02/23/22 2038   BLOOD CULTURE  No Growth After 4 Days  No Growth After 4 Days   No Growth After 4 Days  No Growth at 24 hrs         Last 24 Hours Medication List:   Current Facility-Administered Medications   Medication Dose Route Frequency Provider Last Rate    acetaminophen  975 mg Per G Tube Q6H PRN [employer] V, CRNP      albuterol  2 5 mg Nebulization Q4H PRN Roz Simmonso V, CRNP      amiodarone  200 mg Oral Daily With Breakfast Roz Simmonso V, CRNP      apixaban  5 mg Oral BID Roz Spironello V, CRNP      atorvastatin  40 mg Oral Daily Roz Spirocarloso V, CRNP      budesonide  0 5 mg Nebulization Q12H Roz Simmonso V, CRNP      chlorhexidine  15 mL Mouth/Throat Q12H [address] Roz Spironello V, CRNP      famotidine  20 mg Oral BID Roz Simmonso V, CRNP      furosemide  40 mg Intravenous BID (diuretic) Roz Simmonso V, CRNP      insulin glargine  8 Units Subcutaneous HS Lucia Adame MD      insulin lispro  1-6 Units Subcutaneous HS Roz Simmonso JOLEEN, TRISHNP      insulin lispro  2 Units Subcutaneous TID With Meals Lucia Adame MD      insulin lispro  2-12 Units Subcutaneous TID AC Roz Simmonso V, CRNP      ipratropium  0 5 mg Nebulization TID [employer] V, NEELAM      levalbuterol  1 25 mg Nebulization TID [employer] JOLEEN, NEELAM      LORazepam  0 5 mg Oral BID PRN Roz Simmonso V, CRNP      losartan  50 mg Oral Daily Roz Simmonso JOLEEN, CRNP      melatonin  6 mg Oral HS Roz Simmonso V, CRNP      metoprolol tartrate  25 mg Oral Q12H [address] Roz Spironello V, CRNP      ondansetron  4 mg Intravenous Q6H PRN Roz Levynello V, CRNP      polyethylene glycol  17 g Oral Daily Roz Spironello V, CRNP      pregabalin  75 mg Oral Daily Roz Spironello V, CRNP      QUEtiapine  25 mg Oral HS Roz Spironello V, CRNP      senna-docusate sodium  1 tablet Oral HS Roz Spironello V, CRNP      sodium chloride  4 mL Nebulization TID DO Esteban Gibbonsagrelor  90 mg Oral Q12H [address]          Today, Patient Was Seen By: Lelo Costello MD    ** Please Note: "This note has been constructed using a voice recognition system  Therefore there may be syntax, spelling, and/or grammatical errors   Please call if you have any questions  "**

## 2022-02-28 NOTE — PLAN OF CARE
Problem: CARDIOVASCULAR - ADULT  Goal: Absence of cardiac dysrhythmias or at baseline rhythm  Description: INTERVENTIONS:  - Continuous cardiac monitoring, vital signs, obtain 12 lead EKG if ordered  - Administer antiarrhythmic and heart rate control medications as ordered  - Monitor electrolytes and administer replacement therapy as ordered  Outcome: Progressing  Note: Patient maintains sinus rhythm with bundle branch block  On continuous telemetry as pt prefers to be off Life Vest at this time  Problem: METABOLIC, FLUID AND ELECTROLYTES - ADULT  Goal: Electrolytes maintained within normal limits  Description: INTERVENTIONS:  - Monitor labs and assess patient for signs and symptoms of electrolyte imbalances  - Administer electrolyte replacement as ordered  - Monitor response to electrolyte replacements, including repeat lab results as appropriate  - Instruct patient on fluid and nutrition as appropriate  Outcome: Progressing  Note: Electrolytes WNL  Problem: RESPIRATORY - ADULT  Goal: Achieves optimal ventilation and oxygenation  Description: INTERVENTIONS:  - Assess for changes in respiratory status  - Assess for changes in mentation and behavior  - Position to facilitate oxygenation and minimize respiratory effort  - Oxygen administered by appropriate delivery if ordered  - Initiate smoking cessation education as indicated  - Encourage broncho-pulmonary hygiene including cough, deep breathe, Incentive Spirometry  - Assess the need for suctioning and aspirate as needed  - Assess and instruct to report SOB or any respiratory difficulty  - Respiratory Therapy support as indicated  Outcome: Progressing  Note: Stable, maintained on 35% FiO2 at 10lpm via trach mask

## 2022-02-28 NOTE — ASSESSMENT & PLAN NOTE
Wt Readings from Last 3 Encounters:   02/28/22 85 kg (187 lb 6 3 oz)   11/23/21 87 5 kg (192 lb 14 4 oz)   11/04/21 91 3 kg (201 lb 4 5 oz)     History of ischemic cardiomyopathy, awaiting AICD  Echocardiogram 10/21 - EF 40%, akinesis of inferior and anterolateral LV  CTA chest 2/23 - bilateral effusion and bilateral opacity secondary to pulmonary edema   Echocardiogram - 02/24/2022 - EF 50%, moderate hypokinesis of inferior and basal anterolateral wall  · Continue IV Lasix  · Monitor intake output, daily weights  · Cardiology following, input appreciated

## 2022-02-28 NOTE — CASE MANAGEMENT
Case Management Discharge Planning Note    Patient name Steff Canela  Location [address] [place] [address] [address]-* MRN [id number]  : 1965 Date 2022       Current Admission Date: 2022  Current Admission Diagnosis:Acute on chronic respiratory failure with hypoxia [facility])   Patient Active Problem List    Diagnosis Date Noted    Subglottic stenosis 2022    Acute on chronic combined systolic and diastolic heart failure ([facility] ) 2022    CAD (coronary artery disease) 2021    Urinary retention 2021    Cerebral aneurysm 2021    Cerebral vascular accident ([facility] ) 2021    Acute on chronic respiratory failure with hypoxia ([facility] ) 2021    History of Cardiac arrest ([facility] ) 2021    A-fib ([facility] ) 10/30/2021    History of Ventricular tachycardia ([place] [address] ) 10/27/2021    Cellulitis of left lower extremity 2021    Hypoglycemia 2021    Polypharmacy 2021    Trigger finger, right middle finger 2021    Trigger finger, right ring finger 2021    Diarrhea 2021    Nasal vestibulitis 2021    Orthopnea 2021    CHANTALE (obstructive sleep apnea) 2021    Palpitations 2020    Abscess 10/11/2020    Bacterial vaginosis 2020    Urinary tract infection with hematuria 2020    Epigastric pain 2019    Thoracic aortic aneurysm without rupture ([facility] ) 2018    Abnormal urinalysis 2018    Vaginal discharge 2018    Yeast vaginitis 2018    Recurrent vaginitis 2018    Cardiac murmur 12/15/2017    Primary insomnia 12/15/2017    Anxiety 2017    Depression, recurrent ([facility] ) 2017    [facility] to [facility] diabetes [facility] ) 2017    Fibromyalgia 2017    Hypertension 2017    Hypoestrogenism 2017    Neuropathy 2017    Chronic pain disorder 2017    Medically complex patient 2017    Change in bowel habit 2017    Screening for colon cancer 12/05/2016    Cough 02/15/2016    Non compliance with medical treatment 01/27/2016    Dizziness 01/26/2016    Gastroesophageal reflux disease with esophagitis 01/26/2016    Vertigo 01/26/2016    Vertebral body hemangioma 08/21/2015    Hemangioma of bone 07/30/2015    Right-sided thoracic back pain 07/23/2015    Thoracic radiculitis 07/23/2015    Carpal tunnel syndrome of left wrist 06/26/2015    Tobacco abuse 06/26/2015    Uncontrolled type 2 diabetes mellitus with complication, with long-term current use of insulin ([facility] ) 06/09/2015    Hyperlipidemia associated with type 2 diabetes mellitus ([place] [address] ) 05/06/2015    Noncompliance with diet and medication regimen 05/06/2015    Shingles 03/25/2015    Diabetic peripheral neuropathy ([place] [address] ) 02/25/2015    Low back pain 02/25/2015    Lumbar radiculopathy 02/25/2015    Overweight 02/25/2015    Sciatica of left side 02/25/2015      LOS (days): 4  Geometric Mean LOS (GMLOS) (days): 3 80  Days to GMLOS:-0 9     OBJECTIVE:  Risk of Unplanned Readmission Score: 38   Current admission status: Inpatient   Preferred Pharmacy:   [address] [facility] [address] [facility] [address] [address] [address]  Phone: [phone] Fax: [facility] [address] [place] [address] [address] [facility]  [address]45 [address] [facility] [address]  Phone: [phone] Fax: [phone]    [facility] #[id number] - [address], [facility] [address] [facility] [address] [facility] [address], [address] [facility] [address]  Phone: [phone] Fax: [address] [facility], [facility] [address] [address] [facility] [address] [place] [address] [address] [place] [address]  Phone: [phone] Fax: [phone]    Primary Care Provider: Nadira White MD    Primary Insurance: MEDICARE  Secondary Insurance: [facility][note 3][facility]    DISCHARGE DETAILS:  CM received a message from patients nurse Staci Point that patients sister Minnie Al [facility] [phone] would like a call from CM and requests cm also call a  Jennifer Palacios with the [facility] [employer] [facility] in [place] [address]  CM called patients sister Andrew Delvalle and she expresses concern for patient to [place] home at [place] without services  She states that they have been working on getting her on the [facility] with the [facility]  She states they have a  Jennifer Palacios who is working with them to get medical equipment and [employer] set up for patient in the home  CM called Damion with [facility] and discussed the above  He states that he has been in contact with multiple family members regarding needs and they all have different stories and requests  He states he needs to assess and evaluate patient to see if she qualifies under any programs  CM made him aware that discharge is anticipated in 24-48 hrs  He requests to speak with family again and call cm back in the am  CM number was provided  CM attempted to call patients sister Andrew Delvalle back but there was no answer      CM to follow in am

## 2022-02-28 NOTE — PROGRESS NOTES
Progress Note - Pulmonary   Matias Cortez 55 y o  female MRN: [id number]  Unit/Bed#: [address] [place] [address] Encounter: [id number]    Assessment:  Acute on chronic hypoxemic respiratory failure improved  Patient did have evidence of congestive heart failure on admission  Subglottic tracheal stenosis from prior intubation when she had a cardiac arrest    Patient did have tracheostomy revision done on 1/7/22 due to subglottic stenosis  Previous flexible laryngoscopy done on 01/03/2022 showed 60 percent subglottic stenosis  She did have a size #6 Uncuffed Shiley tracheostomy tube placed on 01/13/22 previous after cardiac arrest she did have tracheostomy tube and this was removed on 12/11/2021  She had presented to [address] [place] [address] on 01/01/2022 after having cardiac arrest   She was be able to intubated for anesthesia emergency room  Possible COPD  Patient with 30 pack-year smoking history  Does not appear to have exacerbation now    Plan:  I did place order for size #6 uncuffed Shiley fenestrated tracheostomy tube  When this is available we can replace present cuffed tracheostomy tube with this uncuffed fenestrated tracheostomy to  Patient is on neb treatments with levalbuterol 1 25 milligram and Atrovent 0 5 milligram t i d  And budesonide 0 5 milligram b i d  Subjective:   States she has difficulty speaking when she occluded her tracheostomy tube with her finger  Would present tracheostomy tube change  Has a cuffed Shiley #6  tracheostomy  in place    Objective:     Vitals: Blood pressure 122/78, pulse 70, temperature 98 1 °F (36 7 °C), resp  rate 18, height 5' 10" (1 778 m), weight 85 kg (187 lb 6 3 oz), SpO2 99 %  ,Body mass index is 26 89 kg/m²  Intake/Output Summary (Last 24 hours) at 2/28/2022 1704  Last data filed at 2/28/2022 0601  Gross per 24 hour   Intake --   Output 700 ml   Net -700 ml       Physical Exam: Physical Exam  Vitals reviewed     Constitutional:       General: She is not in acute distress  Appearance: Normal appearance  She is well-developed  Comments: On 35% trach collar O2 saturation on 98%  Patient is sitting in chair at bedside and is comfortable   HENT:      Head: Normocephalic  Right Ear: External ear normal       Left Ear: External ear normal       Nose: Nose normal       Mouth/Throat:      Mouth: Mucous membranes are moist       Pharynx: Oropharynx is clear  No oropharyngeal exudate  Eyes:      Conjunctiva/sclera: Conjunctivae normal       Pupils: Pupils are equal, round, and reactive to light  Neck:      Vascular: No JVD  Trachea: No tracheal deviation  Cardiovascular:      Rate and Rhythm: Normal rate and regular rhythm  Heart sounds: Normal heart sounds  Pulmonary:      Effort: Pulmonary effort is normal    Abdominal:      General: There is no distension  Palpations: Abdomen is soft  Tenderness: There is no abdominal tenderness  There is no guarding  Musculoskeletal:      Cervical back: Neck supple  Comments: No edema   Lymphadenopathy:      Cervical: No cervical adenopathy  Skin:     General: Skin is warm and dry  Findings: No rash  Neurological:      Mental Status: She is alert and oriented to person, place, and time  Psychiatric:         Behavior: Behavior normal          Thought Content: Thought content normal           Labs: I have personally reviewed pertinent lab results  , ABG:   Lab Results   Component Value Date    PHART 7 487 (H) 11/04/2021    RKU3MES 38 9 11/04/2021    PO2ART 68 6 (L) 11/04/2021    EMP3RHE 28 8 (H) 11/04/2021    BEART 5 0 11/04/2021    SOURCE Line, Arterial 11/04/2021   , BNP:   Lab Results   Component Value Date     1 (H) 02/23/2022   , CBC:   Lab Results   Component Value Date    WBC 8 63 02/28/2022    HGB 8 5 (L) 02/28/2022    HCT 28 5 (L) 02/28/2022    MCV 85 02/28/2022     02/28/2022    MCH 25 2 (L) 02/28/2022    MCHC 29 8 (L) 02/28/2022    RDW 18 6 (H) 02/28/2022    MPV 10 5 02/28/2022    NRBC 0 02/28/2022   , CMP:   Lab Results   Component Value Date     07/19/2015    K 4 1 02/28/2022    K 3 7 07/19/2015     02/28/2022     07/19/2015    CO2 28 02/28/2022    CO2 27 10/28/2021    ANIONGAP 8 07/19/2015    BUN 21 02/28/2022    BUN 16 07/19/2015    CREATININE 1 02 02/28/2022    CREATININE 0 71 07/19/2015    GLUCOSE 185 (H) 10/28/2021    GLUCOSE 230 (H) 07/19/2015    CALCIUM 8 2 (L) 02/28/2022    CALCIUM 8 5 07/19/2015    AST 12 02/24/2022    AST 16 07/19/2015    ALT 18 02/24/2022    ALT 30 07/19/2015    ALKPHOS 83 02/24/2022    ALKPHOS 103 07/19/2015    PROT 7 7 07/19/2015    BILITOT 0 43 07/19/2015    EGFR 62 02/28/2022   , PT/INR:   Lab Results   Component Value Date    INR 1 32 (H) 02/23/2022   , Troponin: No results found for: TROPONIN    Imaging and other studies: I have personally reviewed pertinent reports     and I have personally reviewed pertinent films in PACS

## 2022-03-01 LAB
ANION GAP SERPL CALCULATED.3IONS-SCNC: 7 MMOL/L (ref 4–13)
BACTERIA BLD CULT: NORMAL
BUN SERPL-MCNC: 25 MG/DL (ref 5–25)
CALCIUM SERPL-MCNC: 8.4 MG/DL (ref 8.3–10.1)
CHLORIDE SERPL-SCNC: 103 MMOL/L (ref 100–108)
CO2 SERPL-SCNC: 31 MMOL/L (ref 21–32)
CREAT SERPL-MCNC: 1.18 MG/DL (ref 0.6–1.3)
ERYTHROCYTE [DISTWIDTH] IN BLOOD BY AUTOMATED COUNT: 18.8 % (ref 11.6–15.1)
GFR SERPL CREATININE-BSD FRML MDRD: 52 ML/MIN/1.73SQ M
GLUCOSE SERPL-MCNC: 195 MG/DL (ref 65–140)
GLUCOSE SERPL-MCNC: 201 MG/DL (ref 65–140)
GLUCOSE SERPL-MCNC: 234 MG/DL (ref 65–140)
GLUCOSE SERPL-MCNC: 234 MG/DL (ref 65–140)
GLUCOSE SERPL-MCNC: 266 MG/DL (ref 65–140)
HCT VFR BLD AUTO: 30.2 % (ref 34.8–46.1)
HGB BLD-MCNC: 8.7 G/DL (ref 11.5–15.4)
MAGNESIUM SERPL-MCNC: 2.1 MG/DL (ref 1.6–2.6)
MCH RBC QN AUTO: 24.4 PG (ref 26.8–34.3)
MCHC RBC AUTO-ENTMCNC: 28.8 G/DL (ref 31.4–37.4)
MCV RBC AUTO: 85 FL (ref 82–98)
PLATELET # BLD AUTO: 363 THOUSANDS/UL (ref 149–390)
PMV BLD AUTO: 10.9 FL (ref 8.9–12.7)
POTASSIUM SERPL-SCNC: 3.8 MMOL/L (ref 3.5–5.3)
RBC # BLD AUTO: 3.56 MILLION/UL (ref 3.81–5.12)
SODIUM SERPL-SCNC: 141 MMOL/L (ref 136–145)
WBC # BLD AUTO: 8.5 THOUSAND/UL (ref 4.31–10.16)

## 2022-03-01 PROCEDURE — 94760 N-INVAS EAR/PLS OXIMETRY 1: CPT

## 2022-03-01 PROCEDURE — 99232 SBSQ HOSP IP/OBS MODERATE 35: CPT | Performed by: INTERNAL MEDICINE

## 2022-03-01 PROCEDURE — 82948 REAGENT STRIP/BLOOD GLUCOSE: CPT

## 2022-03-01 PROCEDURE — 97110 THERAPEUTIC EXERCISES: CPT

## 2022-03-01 PROCEDURE — 80048 BASIC METABOLIC PNL TOTAL CA: CPT | Performed by: INTERNAL MEDICINE

## 2022-03-01 PROCEDURE — 97530 THERAPEUTIC ACTIVITIES: CPT

## 2022-03-01 PROCEDURE — 83735 ASSAY OF MAGNESIUM: CPT | Performed by: INTERNAL MEDICINE

## 2022-03-01 PROCEDURE — 94640 AIRWAY INHALATION TREATMENT: CPT

## 2022-03-01 PROCEDURE — 99232 SBSQ HOSP IP/OBS MODERATE 35: CPT | Performed by: STUDENT IN AN ORGANIZED HEALTH CARE EDUCATION/TRAINING PROGRAM

## 2022-03-01 PROCEDURE — 85027 COMPLETE CBC AUTOMATED: CPT | Performed by: INTERNAL MEDICINE

## 2022-03-01 RX ORDER — FUROSEMIDE 10 MG/ML
40 INJECTION INTRAMUSCULAR; INTRAVENOUS DAILY
Status: DISCONTINUED | OUTPATIENT
Start: 2022-03-02 | End: 2022-03-03 | Stop reason: HOSPADM

## 2022-03-01 RX ADMIN — FAMOTIDINE 20 MG: 40 POWDER, FOR SUSPENSION ORAL at 18:03

## 2022-03-01 RX ADMIN — APIXABAN 5 MG: 5 TABLET, FILM COATED ORAL at 13:50

## 2022-03-01 RX ADMIN — CHLORHEXIDINE GLUCONATE 0.12% ORAL RINSE 15 ML: 1.2 LIQUID ORAL at 21:23

## 2022-03-01 RX ADMIN — SODIUM CHLORIDE SOLN NEBU 3% 4 ML: 3 NEBU SOLN at 19:54

## 2022-03-01 RX ADMIN — INSULIN GLARGINE 8 UNITS: 100 INJECTION, SOLUTION SUBCUTANEOUS at 21:23

## 2022-03-01 RX ADMIN — SODIUM CHLORIDE SOLN NEBU 3% 4 ML: 3 NEBU SOLN at 07:15

## 2022-03-01 RX ADMIN — INSULIN LISPRO 4 UNITS: 100 INJECTION, SOLUTION INTRAVENOUS; SUBCUTANEOUS at 13:52

## 2022-03-01 RX ADMIN — BUDESONIDE 0.5 MG: 0.5 INHALANT ORAL at 07:15

## 2022-03-01 RX ADMIN — POLYETHYLENE GLYCOL 3350 17 G: 17 POWDER, FOR SOLUTION ORAL at 09:27

## 2022-03-01 RX ADMIN — INSULIN LISPRO 4 UNITS: 100 INJECTION, SOLUTION INTRAVENOUS; SUBCUTANEOUS at 09:36

## 2022-03-01 RX ADMIN — TICAGRELOR 90 MG: 90 TABLET ORAL at 09:27

## 2022-03-01 RX ADMIN — LOSARTAN POTASSIUM 50 MG: 50 TABLET, FILM COATED ORAL at 09:42

## 2022-03-01 RX ADMIN — INSULIN LISPRO 6 UNITS: 100 INJECTION, SOLUTION INTRAVENOUS; SUBCUTANEOUS at 18:03

## 2022-03-01 RX ADMIN — INSULIN LISPRO 2 UNITS: 100 INJECTION, SOLUTION INTRAVENOUS; SUBCUTANEOUS at 13:52

## 2022-03-01 RX ADMIN — Medication 6 MG: at 21:24

## 2022-03-01 RX ADMIN — LORAZEPAM 0.5 MG: 0.5 TABLET ORAL at 14:30

## 2022-03-01 RX ADMIN — ATORVASTATIN CALCIUM 40 MG: 40 TABLET, FILM COATED ORAL at 09:27

## 2022-03-01 RX ADMIN — SENNOSIDES AND DOCUSATE SODIUM 1 TABLET: 50; 8.6 TABLET ORAL at 21:25

## 2022-03-01 RX ADMIN — SODIUM CHLORIDE SOLN NEBU 3% 4 ML: 3 NEBU SOLN at 13:24

## 2022-03-01 RX ADMIN — FUROSEMIDE 40 MG: 10 INJECTION, SOLUTION INTRAMUSCULAR; INTRAVENOUS at 09:27

## 2022-03-01 RX ADMIN — TICAGRELOR 90 MG: 90 TABLET ORAL at 21:24

## 2022-03-01 RX ADMIN — BUDESONIDE 0.5 MG: 0.5 INHALANT ORAL at 19:54

## 2022-03-01 RX ADMIN — INSULIN LISPRO 2 UNITS: 100 INJECTION, SOLUTION INTRAVENOUS; SUBCUTANEOUS at 09:35

## 2022-03-01 RX ADMIN — APIXABAN 5 MG: 5 TABLET, FILM COATED ORAL at 18:03

## 2022-03-01 RX ADMIN — INSULIN LISPRO 2 UNITS: 100 INJECTION, SOLUTION INTRAVENOUS; SUBCUTANEOUS at 18:04

## 2022-03-01 RX ADMIN — LEVALBUTEROL HYDROCHLORIDE 1.25 MG: 1.25 SOLUTION, CONCENTRATE RESPIRATORY (INHALATION) at 13:24

## 2022-03-01 RX ADMIN — IPRATROPIUM BROMIDE 0.5 MG: 0.5 SOLUTION RESPIRATORY (INHALATION) at 19:52

## 2022-03-01 RX ADMIN — IPRATROPIUM BROMIDE 0.5 MG: 0.5 SOLUTION RESPIRATORY (INHALATION) at 13:24

## 2022-03-01 RX ADMIN — CHLORHEXIDINE GLUCONATE 0.12% ORAL RINSE 15 ML: 1.2 LIQUID ORAL at 09:27

## 2022-03-01 RX ADMIN — PREGABALIN 75 MG: 75 CAPSULE ORAL at 09:28

## 2022-03-01 RX ADMIN — LEVALBUTEROL HYDROCHLORIDE 1.25 MG: 1.25 SOLUTION, CONCENTRATE RESPIRATORY (INHALATION) at 19:51

## 2022-03-01 RX ADMIN — AMIODARONE HYDROCHLORIDE 200 MG: 200 TABLET ORAL at 09:42

## 2022-03-01 RX ADMIN — INSULIN LISPRO 3 UNITS: 100 INJECTION, SOLUTION INTRAVENOUS; SUBCUTANEOUS at 21:24

## 2022-03-01 RX ADMIN — LEVALBUTEROL HYDROCHLORIDE 1.25 MG: 1.25 SOLUTION, CONCENTRATE RESPIRATORY (INHALATION) at 07:15

## 2022-03-01 RX ADMIN — ALBUTEROL SULFATE 2.5 MG: 2.5 SOLUTION RESPIRATORY (INHALATION) at 17:33

## 2022-03-01 RX ADMIN — FAMOTIDINE 20 MG: 40 POWDER, FOR SUSPENSION ORAL at 09:35

## 2022-03-01 RX ADMIN — METOPROLOL TARTRATE 25 MG: 25 TABLET, FILM COATED ORAL at 09:27

## 2022-03-01 RX ADMIN — IPRATROPIUM BROMIDE 0.5 MG: 0.5 SOLUTION RESPIRATORY (INHALATION) at 07:15

## 2022-03-01 RX ADMIN — QUETIAPINE FUMARATE 25 MG: 25 TABLET ORAL at 21:24

## 2022-03-01 RX ADMIN — METOPROLOL TARTRATE 25 MG: 25 TABLET, FILM COATED ORAL at 21:24

## 2022-03-01 NOTE — PROGRESS NOTES
Progress Note - Cardiology   [facility]     Steff Canela 55 y o  female MRN: [id number]  : 1965  Unit/Bed#: [address] [place] [address] Encounter: [id number]    Assessment and Plan:     55-year-old lady with history of coronary artery disease status post inferior lateral wall infarction with prolonged hospital stay in October and 2021 at [place] and later on had a prolonged stay at [facility]   At 2021 her EF was around 40% with grade 2 diastolic dysfunction  She had also history of atrial fibrillation  Current EF now around 50% with mild MR  In the hospital set up she had episodes of VT and cardiac arrest and at 1 point was recommended to have ICD placed however it was not placed as she was found to have a right bolus lesion in the lung/abscess and was on antibiotics  She has been on systemic anticoagulation therapy for atrial fibrillation  She still feels that her tracheostomy tube is small and she had a subglottis stenosis  Repeat echo Doppler from [facility] reviewed  Her CT scan has shown some evidence of emphysema  There has been some issues of noncompliant  She has been treated and followed by Pulmonary and she was initially in the ICU  1  Acute hypoxic respiratory failure now much improved  Pulmonary note noted  She is on inhalers  2  Acute on chronic systolic and diastolic heart failure with current EF around 50% with mild MR and moderate pulmonary hypertension  She has wall motion abnormality involving inferior lateral wall consistent with her old lateral infarction  At this time she appears to be developing euvolemic status will cut back to Lasix and start a maintenance does from tomorrow  3  Coronary artery disease status post cardiac catheterization  Initially admitted with STEMI in at 2021 and had a stent placed in mid circumflex into OM1  OM2 was ballooned but not stented    Later on she has a cardiac arrest and had a repeat catheterization found to have apical LAD complete occlusion was felt to be small to be intervened  Continue medical Rx    4  Paroxysmal atrial fibrillation currently in sinus rhythm with right bundle branch block  Continue Eliquis she is on amiodarone  Will decrease the dose of amiodarone 200 mg daily on discharge      5  History of ventricular tachycardia  Cardiology note from [facility] reviewed  She had cardiac history with multiple recent hospitalization in [facility] as well as at [facility]   She has no episodes of VT here she is on amiodarone  Will need EP evaluation for ICD  She was discharged from [facility] with on life vest in February 10 with planned to have elective ICD  Spoke to EP attending at [facility]  Since patient has no further episodes of ventricular tachycardia  And her episodes of cardiac arrest happened in the setting of MI they feel it can be done as an outpatient and would like to follow with her as an outpatient  Spoke to patient she agree with the plan    Plan  Continue beta-blockers  Continue statins  Decrease the Lasix to once a day and from tomorrow we will consider starting p o   EP follow-up with Dr Linnea Yun as an outpatient as [facility] spoke to him  Keep magnesium around 2 and potassium around 4  Continue Rx for COPD/emphysema and trach collar as per Pulmonary  Follow-up electrolytes  Subjective / Objective:   Patient seen and evaluated  She says she feels well  Occasionally feel shortness of breath  She feels her tracheostomy tube is small  Monitor shows no evidence of any significant tachy-dinora arrhythmias she is maintaining her sinus rhythm  Vitals: Blood pressure 108/69, pulse 75, temperature 98 6 °F (37 °C), resp  rate 18, height 5' 10" (1 778 m), weight 85 kg (187 lb 6 3 oz), SpO2 100 %    Vitals:    02/28/22 0600 03/01/22 0600   Weight: 85 kg (187 lb 6 3 oz) 85 kg (187 lb 6 3 oz) Body mass index is 26 89 kg/m²  BP Readings from Last 3 Encounters:   03/01/22 108/69   02/23/22 149/82   02/21/22 155/75     Orthostatic Blood Pressures      Most Recent Value   Blood Pressure 108/69 filed at 03/01/2022 9378   Patient Position - Orthostatic VS Lying filed at 02/28/2022 0727        I/O       02/26 0701  02/27 0700 02/27 0701  02/28 0700 02/28 0701  03/01 0700    P  O        I V  (mL/kg)       Total Intake(mL/kg)       Urine (mL/kg/hr) 2100 (1) 1700 (0 8)     Stool       Total Output 2100 1700     Net -2100 -1700                Invasive Devices  Report    Peripheral Intravenous Line            Peripheral IV 02/27/22 Right Forearm 2 days          Airway            Surgical Airway 4 days                  Intake/Output Summary (Last 24 hours) at 3/1/2022 1458  Last data filed at 2/28/2022 1701  Gross per 24 hour   Intake --   Output 400 ml   Net -400 ml         Physical Exam:   Physical Exam  Constitutional:       General: She is not in acute distress  Appearance: She is well-developed  She is not diaphoretic  Comments: Trach collar is in place   Neck:      Thyroid: No thyromegaly  Vascular: No JVD  Comments: Tracheostomy tube is in place with trach collar  Cardiovascular:      Rate and Rhythm: Normal rate and regular rhythm  Heart sounds: S1 normal and S2 normal  Heart sounds not distant  Murmur heard  Systolic (ejection) murmur is present with a grade of 2/6  No friction rub  No gallop  No S3 or S4 sounds  Pulmonary:      Effort: No respiratory distress  Breath sounds: No wheezing or rales  Comments: Bilateral few rhonchi noted  Chest:      Chest wall: No tenderness  Abdominal:      General: Bowel sounds are normal  There is no distension  Palpations: Abdomen is soft  Tenderness: There is no abdominal tenderness  Musculoskeletal:         General: No deformity  Cervical back: Neck supple        Comments: No edema   Skin:     General: Skin is warm and dry  Coloration: Skin is not pale  Findings: No rash  Neurological:      Mental Status: She is alert and oriented to person, place, and time     Psychiatric:         Behavior: Behavior normal          Judgment: Judgment normal            Medications/ Allergies:     Current Facility-Administered Medications   Medication Dose Route Frequency Provider Last Rate    acetaminophen  975 mg Per G Tube Q6H PRN Roz Simmonso JOLEEN, CRJAYLIN      albuterol  2 5 mg Nebulization Q4H PRN Roz Simmonso JOLEEN, CRNP      amiodarone  200 mg Oral Daily With Breakfast Roz Wright V, CRNP      apixaban  5 mg Oral BID Roz Wright V, CRNP      atorvastatin  40 mg Oral Daily Roz Wright V, CRNP      budesonide  0 5 mg Nebulization Q12H Roz Wright V, CRNP      chlorhexidine  15 mL Mouth/Throat Q12H [facility] Roz Simmonso JOLEEN, NEELAM      famotidine  20 mg Oral BID Roz Wright V, CRNP      furosemide  40 mg Intravenous BID (diuretic) Roz Wright V, NEELAM      insulin glargine  8 Units Subcutaneous HS Yuki Junior MD      insulin lispro  1-6 Units Subcutaneous HS Roz Wright V, NEELAM      insulin lispro  2 Units Subcutaneous TID With Meals Yuki Junior MD      insulin lispro  2-12 Units Subcutaneous TID AC Roz Wright V, NEELAM      ipratropium  0 5 mg Nebulization TID Ezio Booth V, CRNP      levalbuterol  1 25 mg Nebulization TID Ezio Booth V, CRNP      LORazepam  0 5 mg Oral BID PRN Roz Simmonso JOLEEN, CRNP      losartan  50 mg Oral Daily Roz Simmonso JOLEEN, CRNP      melatonin  6 mg Oral HS Roz Wright V, CRNP      metoprolol tartrate  25 mg Oral Q12H [facility] Roz Wright V, NEELAM      ondansetron  4 mg Intravenous Q6H PRN Roz Simmonso JOLEEN, CRNP      polyethylene glycol  17 g Oral Daily Roz Simmonso V, CRNP      pregabalin  75 mg Oral Daily Roz Wright V, CRNP      QUEtiapine 25 mg Oral HS Roz Spironello V, CRNP      senna-docusate sodium  1 tablet Oral HS Roz Spironello V, CRNP      sodium chloride  4 mL Nebulization TID Rylee Amos DO      ticagrelor  90 mg Oral Q12H [facility] Roz Levycarloso V, CRNP       acetaminophen, 975 mg, Q6H PRN  albuterol, 2 5 mg, Q4H PRN  LORazepam, 0 5 mg, BID PRN  ondansetron, 4 mg, Q6H PRN      Allergies   Allergen Reactions    Metformin Diarrhea    Clonidine Rash     Patch          Labs:   Troponins:  Results from last 7 days   Lab Units 02/23/22  2220   HSTNI D2 ng/L -2         CBC with diff:  Results from last 7 days   Lab Units 03/01/22  0507 02/28/22  0510 02/27/22  0545 02/26/22  0737 02/25/22  0551 02/24/22  0518 02/23/22  2038   WBC Thousand/uL 8 50 8 63 8 23 8 09 8 09 8 60 9 86   HEMOGLOBIN g/dL 8 7* 8 5* 8 0* 8 0* 7 9* 7 7* 10 4*   HEMATOCRIT % 30 2* 28 5* 26 7* 27 4* 27 0* 25 4* 34 0*   MCV fL 85 85 85 85 85 85 83   PLATELETS Thousands/uL 363 334 330 305 287 288 392*   MCH pg 24 4* 25 2* 25 5* 24 8* 24 9* 25 8* 25 5*   MCHC g/dL 28 8* 29 8* 30 0* 29 2* 29 3* 30 3* 30 6*   RDW % 18 8* 18 6* 18 9* 18 8* 19 3* 18 7* 18 7*   MPV fL 10 9 10 5 10 9 10 8 11 0 11 2 11 6   NRBC AUTO /100 WBCs  --  0  --   --   --  0 0       CMP:  Results from last 7 days   Lab Units 03/01/22  0507 02/28/22  0510 02/27/22  0545 02/26/22  0737 02/25/22  0551 02/24/22  0518 02/23/22  2038   SODIUM mmol/L 141 142 143 143 145 144 145   POTASSIUM mmol/L 3 8 4 1 4 5 3 7 3 9 3 1* 3 3*   CHLORIDE mmol/L 103 106 107 105 110* 111* 108   CO2 mmol/L 31 28 28 28 27 25 24   ANION GAP mmol/L 7 8 8 10 8 8 13   BUN mg/dL 25 21 14 15 14 12 13   CREATININE mg/dL 1 18 1 02 0 86 0 87 0 90 0 81 0 91   CALCIUM mg/dL 8 4 8 2* 8 1* 8 1* 8 2* 7 9* 9 1   AST U/L  --   --   --   --   --  12 14*   ALT U/L  --   --   --   --   --  18 13   ALK PHOS U/L  --   --   --   --   --  83 92 1   TOTAL PROTEIN g/dL  --   --   --   --   --  6 0* 7 6   ALBUMIN g/dL  --   --   --   --   --  2 6* 3  9   TOTAL BILIRUBIN mg/dL  --   --   --   --   --  0 54 0 69   EGFR ml/min/1 73sq m 52 62 76 75 72 82 71       Magnesium:  Results from last 7 days   Lab Units 03/01/22  0507 02/28/22  0510 02/27/22  0545 02/26/22  0737 02/25/22  0551 02/24/22  0518 02/23/22 2038   MAGNESIUM mg/dL 2 1 2 1 2 2 2 0 2 3 1 9 1 5*     Coags:  Results from last 7 days   Lab Units 02/23/22 2038   PTT seconds 25   INR  1 32*         Imaging & Testing   I have personally reviewed pertinent reports  XR chest 1 view portable    Result Date: 2/24/2022  Narrative: CHEST INDICATION:   sob  COMPARISON:  Chest radiograph 2/21/2022  EXAM PERFORMED/VIEWS:  XR CHEST PORTABLE FINDINGS:  Lesser extent tracheostomy unchanged  Heart shadow is enlarged but unchanged from prior exam  Stable mild pulmonary edema  Stable small left pleural effusion  No pneumothorax  Osseous structures appear within normal limits for patient age  Impression: Stable mild pulmonary edema and small left pleural effusion  Workstation performed: [id number]       CTA ED chest PE Study    Result Date: 2/23/2022  Narrative: CTA - CHEST WITH IV CONTRAST - PULMONARY ANGIOGRAM INDICATION:   Shortness of breath, + dimer, trach, CHF  COMPARISON: CT of the chest abdomen pelvis on November 14, 2021  TECHNIQUE: CTA examination of the chest was performed using angiographic technique according to a protocol specifically tailored to evaluate for pulmonary embolism  Axial, sagittal, and coronal 2D reformatted images were created from the source data and  submitted for interpretation  In addition, coronal 3D MIP postprocessing was performed on the acquisition scanner  Radiation dose length product (DLP) for this visit:  468 6 mGy-cm   This examination, like all CT scans performed in the [facility], was performed utilizing techniques to minimize radiation dose exposure, including the use of iterative reconstruction and automated exposure control   IV Contrast:  100 mL of iohexol (OMNIPAQUE)  FINDINGS: PULMONARY ARTERIAL TREE:  No pulmonary embolus is seen  LUNGS: Tracheostomy tube in place  Hypoventilatory changes of lungs  Mild diffuse groundglass opacity within the lungs may be due to pulmonary edema  There are patchy opacities at the bilateral lower lobe bases and within the posterior left upper lobe  which may be due to pneumonia in the appropriate clinical setting  PLEURA:  Small bilateral pleural effusions  HEART/GREAT VESSELS:  Cardiomegaly  No pericardial effusion  Coronary artery calcifications  No thoracic aortic aneurysm  MEDIASTINUM AND REECE:  Mediastinal lymph nodes measure up to 9 mm in short axis  CHEST WALL AND LOWER NECK:   Unremarkable  VISUALIZED STRUCTURES IN THE UPPER ABDOMEN:  Unremarkable  OSSEOUS STRUCTURES: No acute fracture  Redemonstrated old sternal and bilateral rib fractures  Impression: 1  No evidence of pulmonary embolism  2   Mild diffuse ground glass opacities in the lungs may be due to pulmonary edema  Small bilateral pleural effusions  Cardiomegaly  Correlate for heart failure  3   Patchy opacities at the bilateral lower lobe bases and within the posterior left upper lobe which may be due to atelectasis and/or pneumonia in the appropriate clinical setting  Workstation performed: [id number]     Echo complete w/ contrast if indicated    Result Date: 2/24/2022  Narrative: [place]  Left Ventricle: Left ventricular cavity size is normal  Wall thickness is moderately increased  Systolic function is normal   Estimated ejection fraction is 50%  There is moderate hypokinesis of the inferior and the basal anterolateral walls  Diastolic function is normal  There is moderate concentric hypertrophy    Left Atrium: The atrium is mildly dilated    Right Atrium: The atrium is mildly dilated    Tricuspid Valve: There is mild regurgitation  The right ventricular systolic pressure is mildly to moderately elevated at 54 mm Hg    Aorta:  Aortic root is mildly dilated  Consider CTA for more accurate evaluation    Pericardium: There is a trivial pericardial effusion  There is no echocardiographic evidence of tamponade  There is a left pleural effusion    Changes include: Ejection fraction has improved  RV systolic pressure is elevated  EKG / Monitor: Personally reviewed  Admission EKG shows normal sinus rhythm rhythm right bundle branch block lateral wall infarction  Dr Ermias Mcfarlane MD [facility]      "This note has been constructed using a voice recognition system  Therefore there may be syntax, spelling, and/or grammatical errors   Please call if you have any questions  "

## 2022-03-01 NOTE — OCCUPATIONAL THERAPY NOTE
Occupational Therapy Treatment Note       03/01/22 4350   Note Type   Note Type Treatment   Restrictions/Precautions   Other Precautions Fall Risk;O2  (Trach collar)   Pain Assessment   Pain Assessment Tool 0-10   Pain Score No Pain   Therapeutic Exercise - ROM   UE-ROM Yes   ROM- Right Upper Extremities   R Shoulder AROM;ABduction   R Elbow AROM;Elbow flexion;Elbow extension   R Weight/Reps/Sets 10 reps each   ROM - Left Upper Extremities    L Shoulder AROM;ABduction   L Elbow AROM;Elbow flexion;Elbow extension   L Weight/Reps/Sets 10 reps each   Cognition   Overall Cognitive Status WFL   Arousal/Participation Alert; Cooperative  (Anxious)   Following Commands Follows multistep commands with increased time or repetition   Activity Tolerance   Activity Tolerance Patient limited by fatigue; Other (Comment)  (Limited by SOB)   Assessment   Assessment Patient seen for OT treatment session this PM  Patient began with participation in [employer] there-ex  After 2 sets of exercises patient with c/o SOB  SOB did not resolve with increased rest break or time  Patient on trach collar, SpO2 96-99% throughout activity and during patient c/o SOB  RN notified after patient with continued c/o SOB  RN entered room to assess patient, reports patient with increased anxiety and likely anxiety attack  Further activity deferred  The patient's raw score on the AM-PAC Daily Activity inpatient short form is 20, standardized score is 42 03, greater than 39 4  Patients at this level are likely to benefit from discharge to home  Please refer to the recommendation of the Occupational Therapist for safe discharge planning  At end of session patient seated in bedside chair in care of RN  Continue OT per POC      Plan   OT Frequency 3-5x/wk   Recommendation   OT Discharge Recommendation Home with home health rehabilitation   -[facility] Daily Activity Inpatient   Lower Body Dressing 3   Bathing 3   Toileting 3   Upper Body Dressing 3   Grooming 4   Eating 4 Daily Activity Raw Score 20   Daily Activity Standardized Score (Calc for Raw Score >=11) 42 03   AM-PAC Applied Cognition Inpatient   Following a Speech/Presentation 4   Understanding Ordinary Conversation 4   Taking Medications 4   Remembering Where Things Are Placed or Put Away 4   Remembering List of 4-5 Errands 4   Taking Care of Complicated Tasks 4   Applied Cognition Raw Score 24   Applied Cognition Standardized Score 66 05   Licensure   [place] License Number  Jessica May, OTR/L [id number]

## 2022-03-01 NOTE — PHYSICAL THERAPY NOTE
PT TREATMENT     03/01/22 1055   Note Type   Note Type Treatment   Pain Assessment   Pain Assessment Tool 0-10   Pain Score No Pain   Restrictions/Precautions   Other Precautions Fall Risk;O2, trach   General   Chart Reviewed Yes   Cognition   Overall Cognitive Status WFL   Subjective   Subjective agreeable to activity   Transfers   Sit to Stand 4  Minimal assistance   Stand to Sit 4  Minimal assistance   Stand pivot 4  Minimal assistance   Additional items   (with walker)   Additional Comments RT supplied trach collar/tubing that can be used with 02 tank  Ambulation/Elevation   Gait pattern   (L knee recurvatum)   Gait Assistance 4  Minimal assist   Assistive Device Rolling walker   Distance 20 feet, 2x 30 feet   Balance   Static Sitting Good   Dynamic Sitting Fair +   Static Standing Fair   Dynamic Standing Fair -   Ambulatory Fair -   Endurance Deficit   Endurance Deficit   (Sp02 >90% with activity)   Activity Tolerance   Activity Tolerance Patient tolerated treatment well;Patient limited by fatigue   Assessment   Prognosis Good   Problem List Decreased strength;Decreased endurance; Impaired balance;Decreased mobility   Assessment Pt demonstrates improving endurance and function  Pt was able to ambulate 3x  with a walker which is the most walking pt had done in a while  The patient's [facility] Basic Mobility Inpatient Short Form Raw Score is 19  A Raw score of greater than 16 suggests the patient may benefit from discharge to home  Plan   Treatment/Interventions ADL retraining;Functional transfer training;LE strengthening/ROM; Elevations; Therapeutic exercise; Bed mobility;Gait training;Patient/family training; Endurance training   Progress Progressing toward goals   PT Frequency Other (Comment)  (5w)   Recommendation   PT Discharge Recommendation Home with home health rehabilitation   Equipment Recommended   (pt has a walker and 02)   [facility] Basic Mobility Inpatient   Turning in Bed Without Bedrails 4 Lying on Back to Sitting on Edge of Flat Bed 4   Moving Bed to Chair 3   Standing Up From Chair 3   Walk in Room 3   Climb 3-5 Stairs 2   Basic Mobility Inpatient Raw Score 19   Basic Mobility Standardized Score 42 48   Highest Level Of Mobility   JH-HLM Goal 6: Walk 10 steps or more   JH-HLM Highest Level of Mobility 7: Walk 25 feet or more   JH-HLM Goal Achieved Yes   Licensure   [address] [place] [address] License Number  Lindajean Ghazi PT [id number]

## 2022-03-01 NOTE — PLAN OF CARE
Problem: PAIN - ADULT  Goal: Verbalizes/displays adequate comfort level or baseline comfort level  Description: Interventions:  - Encourage patient to monitor pain and request assistance  - Assess pain using appropriate pain scale  - Administer analgesics based on type and severity of pain and evaluate response  - Implement non-pharmacological measures as appropriate and evaluate response  - Consider cultural and social influences on pain and pain management  - Notify physician/advanced practitioner if interventions unsuccessful or patient reports new pain  Outcome: Progressing     Problem: INFECTION - ADULT  Goal: Absence or prevention of progression during hospitalization  Description: INTERVENTIONS:  - Assess and monitor for signs and symptoms of infection  - Monitor lab/diagnostic results  - Monitor all insertion sites, i e  indwelling lines, tubes, and drains  - Monitor endotracheal if appropriate and nasal secretions for changes in amount and color  - [place] appropriate cooling/warming therapies per order  - Administer medications as ordered  - Instruct and encourage patient and family to use good hand hygiene technique  - Identify and instruct in appropriate isolation precautions for identified infection/condition  Outcome: Progressing     Problem: CARDIOVASCULAR - ADULT  Goal: Maintains optimal cardiac output and hemodynamic stability  Description: INTERVENTIONS:  - Monitor I/O, vital signs and rhythm  - Monitor for S/S and trends of decreased cardiac output  - Administer and titrate ordered vasoactive medications to optimize hemodynamic stability  - Assess quality of pulses, skin color and temperature  - Assess for signs of decreased coronary artery perfusion  - Instruct patient to report change in severity of symptoms  Outcome: Progressing  Goal: Absence of cardiac dysrhythmias or at baseline rhythm  Description: INTERVENTIONS:  - Continuous cardiac monitoring, vital signs, obtain 12 lead EKG if ordered  - Administer antiarrhythmic and heart rate control medications as ordered  - Monitor electrolytes and administer replacement therapy as ordered  Outcome: Progressing

## 2022-03-01 NOTE — PROGRESS NOTES
[place] [address]  Progress Note - Amanda Windsor 1965, 55 y o  female MRN: [id number]  Unit/Bed#: [address]- Encounter: [id number]  Primary Care Provider: Rosaura Billings MD   Date and time admitted to hospital: 2/24/2022 12:26 AM    * Acute on chronic respiratory failure with hypoxia [facility])  Assessment & Plan  History of respiratory failure status post tracheostomy and PEG tube placement  On 10 L FiO2 via trach collar at baseline   Presented to ED 2/22 with shortness of breath noted, admission was advised but patient requested to leave   Presented [facility] on 02/23 with acute shortness of breath   Noted to have SpO2 89-92% on arrival, placed on PS 10/6/50% after trach exchange to cuffed trach  CTA of the chest - no pulmonary embolism, diffuse ground-glass opacities may be due to pulmonary edema  Small bilateral pleural effusion  Cardiomegaly   SARS-CoV-2 influenza PCR negative   Respiratory status improved with diuresis  · Currently on trach collar on 35% FiO2  · Chest x-ray with improvement of pulmonary edema   · Pulmonary and Cardiology  following     Acute on chronic combined systolic and diastolic heart failure (HCC)  Assessment & Plan  Wt Readings from Last 3 Encounters:   02/28/22 85 kg (187 lb 6 3 oz)   11/23/21 87 5 kg (192 lb 14 4 oz)   11/04/21 91 3 kg (201 lb 4 5 oz)     History of ischemic cardiomyopathy, awaiting AICD  Echocardiogram 10/21 - EF 40%, akinesis of inferior and anterolateral LV  CTA chest 2/23 - bilateral effusion and bilateral opacity secondary to pulmonary edema   Echocardiogram - 02/24/2022 - EF 50%, moderate hypokinesis of inferior and basal anterolateral wall  · Continue IV Lasix  · Monitor intake output, daily weights  · Cardiology following, input appreciated         A-fib [facility])  Assessment & Plan  On amiodarone and Eliquis    Subglottic stenosis  Assessment & Plan  History of difficult EGD placement prior to respiratory arrest on 01/14/22  Status post tracheostomy placement  · Continue trach care   · Follow-up speech therapy   · Pulmonary follow-up     CAD (coronary artery disease)  Assessment & Plan  History of STEMI 5, hospitalized at [facility] AND [facility] 10/22-11/23  Status post mid LCX culprit lesion stent at bifurcation of OM2  Mild nonobstructive disease of LAD and RCA   · Continue Brilinta, statin, Eliquis      History of Cardiac arrest [facility])  Assessment & Plan  As above    History of Ventricular tachycardia [facility])  Assessment & Plan  Extensive cardiac history with multiple recent hospitalization   Status post prolonged hospitalization at [facility] secondary to STEMI 10/22/2021 status post PATRICA to mid circumflex complicated by cardiogenic shock and cardiac arrest secondary to VT x2  Patient was discharged to rehab with tracheostomy and PEG tube  Subsequently hospitalized at [facility] on 01/01/2022 from [facility] with cardiac arrest   Pre-hospital ROSC after AED shock x1  Recommended ICD placement by Cardiology but deferred due to infected right lung bullae, status post antibiotic treatment   Subsequently patient was discharged from [facility] on life vest on 02/10 with plan for elective ICD placement in 4 weeks  Patient denies any subsequent shocks/events  · Continue amiodarone  · Plan for outpatient EP followup as per Cardiology recs  · Monitor electrolytes    Uncontrolled type 2 diabetes mellitus with complication, with long-term current use of insulin [facility])  Assessment & Plan  Lab Results   Component Value Date    HGBA1C 6 6 (H) 01/01/2022       Recent Labs     02/27/22  1547 02/27/22  1956 02/28/22  0723 02/28/22  1052   POCGLU 264* 229* 155* 262*       Blood Sugar Average: Last 72 hrs:  (P) 771 [id number]     Patient was discharged to rehab on Basaglar 8 units daily and Apidra 2 units t i d    Resumed Lantus 8 units and lispro 2 units before meals   Continue sliding scale     Hypertension  Assessment & Plan  Home medication Cozaar, Lasix, metoprolol   Stable on current meds  · Continue diuresis     Hyperlipidemia associated with type 2 diabetes mellitus ([facility] [address] )  Assessment & Plan  Continue statin    Gastroesophageal reflux disease with esophagitis  Assessment & Plan  On Pepcid    Anxiety  Assessment & Plan  On lorazepam p r n  VTE Pharmacologic Prophylaxis: Eliquis     Patient Centered Rounds: I performed bedside rounds with nursing staff today  Discussions with Specialists or Other Care Team Provider: Yes    Education and Discussions with Family / Patient: Yes    Time Spent for Care: 45 minutes  More than 50% of total time spent on counseling and coordination of care as described above  Current Length of Stay: 5 day(s)  Current Patient Status: Inpatient   Certification Statement: The patient will continue to require additional inpatient hospital stay due to respiratory failure  Discharge Plan: Anticipate discharge in 24-48 hrs to rehab facility  Code Status: Level 1 - Full Code    Subjective:   No acute events overnight   Patient reports some improvement in her shortness of breath     Objective:     Vitals:   Temp (24hrs), Av 5 °F (36 9 °C), Min:98 1 °F (36 7 °C), Max:99 1 °F (37 3 °C)    Temp:  [98 1 °F (36 7 °C)-99 1 °F (37 3 °C)] 98 6 °F (37 °C)  HR:  [62-75] 75  Resp:  [18] 18  BP: (108-141)/(69-95) 108/69  SpO2:  [94 %-100 %] 99 %  Body mass index is 26 89 kg/m²  Input and Output Summary (last 24 hours): Intake/Output Summary (Last 24 hours) at 3/1/2022 1142  Last data filed at 2022 1701  Gross per 24 hour   Intake --   Output 400 ml   Net -400 ml       Physical Exam:   Physical Exam  HENT:      Head: Normocephalic and atraumatic  Mouth/Throat:      Mouth: Mucous membranes are moist       Comments: Trach in place   Eyes:      Extraocular Movements: Extraocular movements intact  Cardiovascular:      Rate and Rhythm: Normal rate     Pulmonary:      Effort: Pulmonary effort is normal       Breath sounds: Normal breath sounds  Abdominal:      General: Abdomen is flat  Palpations: Abdomen is soft  Tenderness: There is no abdominal tenderness  Skin:     General: Skin is warm and dry  Neurological:      Mental Status: She is alert  Additional Data:     Labs:  Results from last 7 days   Lab Units 03/01/22  0507 02/28/22  0510 02/28/22  0510   WBC Thousand/uL 8 50   < > 8 63   HEMOGLOBIN g/dL 8 7*   < > 8 5*   HEMATOCRIT % 30 2*   < > 28 5*   PLATELETS Thousands/uL 363   < > 334   NEUTROS PCT %  --   --  64   LYMPHS PCT %  --   --  23   MONOS PCT %  --   --  8   EOS PCT %  --   --  4    < > = values in this interval not displayed  Results from last 7 days   Lab Units 03/01/22  0507 02/25/22  0551 02/24/22  0518   SODIUM mmol/L 141   < > 144   POTASSIUM mmol/L 3 8   < > 3 1*   CHLORIDE mmol/L 103   < > 111*   CO2 mmol/L 31   < > 25   BUN mg/dL 25   < > 12   CREATININE mg/dL 1 18   < > 0 81   ANION GAP mmol/L 7   < > 8   CALCIUM mg/dL 8 4   < > 7 9*   ALBUMIN g/dL  --   --  2 6*   TOTAL BILIRUBIN mg/dL  --   --  0 54   ALK PHOS U/L  --   --  83   ALT U/L  --   --  18   AST U/L  --   --  12   GLUCOSE RANDOM mg/dL 195*   < > 197*    < > = values in this interval not displayed       Results from last 7 days   Lab Units 02/23/22 2038   INR  1 32*     Results from last 7 days   Lab Units 03/01/22  1128 03/01/22  0658 02/28/22  2051 02/28/22  1604 02/28/22  1052 02/28/22  0723 02/27/22  1956 02/27/22  1547 02/27/22  1128 02/27/22  0700 02/26/22  2114 02/26/22  1141   POC GLUCOSE mg/dl 234* 201* 240* 198* 262* 155* 229* 264* 259* 173* 205* 174*         Results from last 7 days   Lab Units 02/25/22  0551 02/24/22  0518 02/23/22 2220 02/23/22 2038   LACTIC ACID mmol/L  --   --  1 6 2 4*   PROCALCITONIN ng/ml 0 13 0 10  --   --        Lines/Drains:  Invasive Devices  Report    Peripheral Intravenous Line            Peripheral IV 02/27/22 Right Forearm 2 days          Airway            Surgical Airway 4 days                  Telemetry:  Telemetry Orders (From admission, onward)             LifeVest Patient: Continuous Telemetry Monitoring during hospitalization (non-expiring)  Continuous LifeVest Telemetry Monitoring        References:    LifeVest Policy                              Imaging: Reviewed radiology reports from this admission including: chest xray    Recent Cultures (last 7 days):   Results from last 7 days   Lab Units 02/24/22  0521 02/24/22  0519 02/23/22 2038   BLOOD CULTURE  No Growth After 5 Days  No Growth After 5 Days  No Growth After 5 Days  No Growth at 24 hrs         Last 24 Hours Medication List:   Current Facility-Administered Medications   Medication Dose Route Frequency Provider Last Rate    acetaminophen  975 mg Per G Tube Q6H PRN Donald MangoNEELAM Perales      albuterol  2 5 mg Nebulization Q4H PRN NEELAM Atkinson      amiodarone  200 mg Oral Daily With Breakfast NEELAM Atkinson      apixaban  5 mg Oral BID Roz Wright V, NEELAM      atorvastatin  40 mg Oral Daily Roz Wright V, NEELAM      budesonide  0 5 mg Nebulization Q12H Roz Wright V, NEELAM      chlorhexidine  15 mL Mouth/Throat Q12H [address] Roz Wright V, NEELAM      famotidine  20 mg Oral BID Roz Wright V, TRISHNP      furosemide  40 mg Intravenous BID (diuretic) NEELAM Atkinson      insulin glargine  8 Units Subcutaneous HS Chantal Kramer MD      insulin lispro  1-6 Units Subcutaneous HS NEELAM Atkinson      insulin lispro  2 Units Subcutaneous TID With Meals Chantal Kramer MD      insulin lispro  2-12 Units Subcutaneous TID AC NEELAM Atkinson      ipratropium  0 5 mg Nebulization TID Donald Mango NEELAM GOODRICH      levalbuterol  1 25 mg Nebulization TID NEELAM Busby      LORazepam  0 5 mg Oral BID PRN Roz Wright V, TRISHNP      losartan  50 mg Oral Daily NEELAM Atkinson  melatonin  6 mg Oral HS Roz Wright V, NELEAM      metoprolol tartrate  25 mg Oral Q12H [address] NEELAM Atkinson      ondansetron  4 mg Intravenous Q6H PRN Roz Simmonso JOLEEN, NEELAM      polyethylene glycol  17 g Oral Daily Roz Spirocarloso JOLEEN, NEELAM      pregabalin  75 mg Oral Daily Roz Simmonso NEELAM GOODRICH      QUEtiapine  25 mg Oral HS NEELAM Atkinson      senna-docusate sodium  1 tablet Oral HS Roz Wright V, NEELAM      sodium chloride  4 mL Nebulization TID Rylee Amos DO      ticagrelor  90 mg Oral Q12H [address] NEELAM Atkinson          Today, Patient Was Seen By: Tyson Tamez DO    **Please Note: This note may have been constructed using a voice recognition system  **

## 2022-03-01 NOTE — CASE MANAGEMENT
Case Management Discharge Planning Note    Patient name Cynthia Hermosillo  Location [address] [place] [address] 407/4 [address]-* MRN [id number]  : 1965 Date 3/1/2022       Current Admission Date: 2022  Current Admission Diagnosis:Acute on chronic respiratory failure with hypoxia [facility])   Patient Active Problem List    Diagnosis Date Noted    Subglottic stenosis 2022    Acute on chronic combined systolic and diastolic heart failure ([facility] [address] ) 2022    CAD (coronary artery disease) 2021    Urinary retention 2021    Cerebral aneurysm 2021    Cerebral vascular accident ([facility] ) 2021    Acute on chronic respiratory failure with hypoxia ([facility] ) 2021    History of Cardiac arrest ([facility] ) 2021    A-fib ([facility] ) 10/30/2021    History of Ventricular tachycardia ([facility] ) 10/27/2021    Cellulitis of left lower extremity 2021    Hypoglycemia 2021    Polypharmacy 2021    Trigger finger, right middle finger 2021    Trigger finger, right ring finger 2021    Diarrhea 2021    Nasal vestibulitis 2021    Orthopnea 2021    CHANTALE (obstructive sleep apnea) 2021    Palpitations 2020    Abscess 10/11/2020    Bacterial vaginosis 2020    Urinary tract infection with hematuria 2020    Epigastric pain 2019    Thoracic aortic aneurysm without rupture ([facility] ) 2018    Abnormal urinalysis 2018    Vaginal discharge 2018    Yeast vaginitis 2018    Recurrent vaginitis 2018    Cardiac murmur 12/15/2017    Primary insomnia 12/15/2017    Anxiety 2017    Depression, recurrent ([facility] ) 2017    Retinal hemorrhage due to secondary diabetes ([facility] ) 2017    Fibromyalgia 2017    Hypertension 2017    Hypoestrogenism 2017    Neuropathy 2017    Chronic pain disorder 2017    Medically complex patient 2017    Change in bowel habit 2017    Screening for colon cancer 12/05/2016    Cough 02/15/2016    Non compliance with medical treatment 01/27/2016    Dizziness 01/26/2016    Gastroesophageal reflux disease with esophagitis 01/26/2016    Vertigo 01/26/2016    Vertebral body hemangioma 08/21/2015    Hemangioma of bone 07/30/2015    Right-sided thoracic back pain 07/23/2015    Thoracic radiculitis 07/23/2015    Carpal tunnel syndrome of left wrist 06/26/2015    Tobacco abuse 06/26/2015    Uncontrolled type 2 diabetes mellitus with complication, with long-term current use of insulin ([facility] ) 06/09/2015    Hyperlipidemia associated with type 2 diabetes mellitus ([place] [address] ) 05/06/2015    Noncompliance with diet and medication regimen 05/06/2015    Shingles 03/25/2015    Diabetic peripheral neuropathy ([facility] ) 02/25/2015    Low back pain 02/25/2015    Lumbar radiculopathy 02/25/2015    Overweight 02/25/2015    Sciatica of left side 02/25/2015      LOS (days): 5  Geometric Mean LOS (GMLOS) (days): 3 80  Days to GMLOS:-1 9     OBJECTIVE:  Risk of Unplanned Readmission Score: 34      Current admission status: Inpatient   Preferred Pharmacy:   [address], [address] [address] [address]  Phone: [phone] Fax: [phone] [place] [address] 2045 [facility]  2045 [address] [facility] [address]  Phone: [phone] Fax: [phone]    [place] [address] [id number] - [address], [facility] [address] [place] [address] [address] [facility] [address]  Phone: [phone] Fax: [address] [facility] [address], [address] [place] [address] [facility] [address] [facility] [address] [facility] [address]  Phone: [phone] Fax: [phone]    Primary Care Provider: Shyann Sierra MD    Primary Insurance: MEDICARE  Secondary Insurance: [place]    DISCHARGE DETAILS:  Discharge planning discussed with[de-identified] Ray Engle (son) [phone]  Freedom of Choice: Yes  Comments - Freedom of Choice: [facility] (no preference)-referral to [address] [place] [address]   CM contacted family/caregiver?: Yes  Were Treatment Team discharge recommendations reviewed with patient/caregiver?: Yes  Did patient/caregiver verbalize understanding of patient care needs?: Yes  Were patient/caregiver advised of the risks associated with not following Treatment Team discharge recommendations?: Yes    Contacts  Patient Contacts: Donald Bryan  Relationship to Patient[de-identified] Family (son)  Contact Method: Phone  Phone Number: [phone]  Reason/Outcome: Continuity of [address]         Is the patient interested in [facility] at discharge?: Yes  [address] requested[de-identified] [address] Name[de-identified] [facility] Provider[de-identified] PCP  Home Health Services Needed[de-identified] Oxygen Via Nasal Cannula,Evaluate Functional Status and Safety,Strengthening/Theraputic Exercises to Improve Function  Oxygen LPM Ordered (if applicable based on home health services needed)::  (10L trache collar)  Homebound Criteria Met[de-identified] Requires Medical Transportation,Requires the Assistance of Another Person for Safe Ambulation or to Leave the Home,Uses an Assist Device (i e  cane, walker, etc)  Supporting Clincal Findings[de-identified] Limited Endurance,Dyspnea with Exertion,Requires Oxygen,Fatigues Easliy in Short Distances    DME Referral Provided  Referral made for DME?: No    Other Referral/Resources/Interventions Provided:  Interventions: [facility]    Would you like to participate in our [address] service program?  : No - Declined    Treatment Team Recommendation[facility] [address]  Discharge Destination Plan[de-identified] Home with [place] at Discharge : BLS Ambulance     IMM Given (Date):: 03/01/22  IMM Given to[de-identified] Family (IMM was discussed with Donald Bryan (son) [phone] and he states understanding )        CM received a call back from [facility] [place]  He requests CM call him with patients discharge so he can f/u for Waiver program and see patient at her home asap at [facility]  His number is [phone]  CM called patients son Lala Reed whom she lives with  He requests that all contact regarding dc and dc planning go through him not patients sister  His mother lives with him and his family  Discussed PT's recommendation for HHC at [facility] and he is agreeable  CM provided him with choice and he says he has no preference  A referral was placed to [facility] in [place]  He states that patient does not need any additional DME at home at this time  He requests all DME orders go through him not patients sister as well  Confirmed patient has home O2 at home through [address] [place] [address]  He says she is on 10L O2 via trache collar at home  Lala Reed is aware that [facility] from [facility] is following patient for the [facility]  He states he does not know if they still need help  He is aware that he will reach out to him at [facility]  CM will need to set up BLS transport at [facility]  CM to follow

## 2022-03-02 LAB
BACTERIA BLD CULT: NORMAL
GLUCOSE SERPL-MCNC: 188 MG/DL (ref 65–140)
GLUCOSE SERPL-MCNC: 202 MG/DL (ref 65–140)
GLUCOSE SERPL-MCNC: 243 MG/DL (ref 65–140)
GLUCOSE SERPL-MCNC: 282 MG/DL (ref 65–140)

## 2022-03-02 PROCEDURE — 82948 REAGENT STRIP/BLOOD GLUCOSE: CPT

## 2022-03-02 PROCEDURE — NC001 PR NO CHARGE: Performed by: PHYSICIAN ASSISTANT

## 2022-03-02 PROCEDURE — 99232 SBSQ HOSP IP/OBS MODERATE 35: CPT | Performed by: INTERNAL MEDICINE

## 2022-03-02 PROCEDURE — 99232 SBSQ HOSP IP/OBS MODERATE 35: CPT | Performed by: STUDENT IN AN ORGANIZED HEALTH CARE EDUCATION/TRAINING PROGRAM

## 2022-03-02 PROCEDURE — 94640 AIRWAY INHALATION TREATMENT: CPT

## 2022-03-02 PROCEDURE — 94760 N-INVAS EAR/PLS OXIMETRY 1: CPT

## 2022-03-02 PROCEDURE — 97530 THERAPEUTIC ACTIVITIES: CPT

## 2022-03-02 RX ORDER — LORAZEPAM 2 MG/ML
1 INJECTION INTRAMUSCULAR ONCE
Status: COMPLETED | OUTPATIENT
Start: 2022-03-02 | End: 2022-03-02

## 2022-03-02 RX ORDER — INSULIN GLARGINE 100 [IU]/ML
10 INJECTION, SOLUTION SUBCUTANEOUS
Status: DISCONTINUED | OUTPATIENT
Start: 2022-03-02 | End: 2022-03-03 | Stop reason: HOSPADM

## 2022-03-02 RX ADMIN — SODIUM CHLORIDE SOLN NEBU 3% 4 ML: 3 NEBU SOLN at 07:34

## 2022-03-02 RX ADMIN — CHLORHEXIDINE GLUCONATE 0.12% ORAL RINSE 15 ML: 1.2 LIQUID ORAL at 20:23

## 2022-03-02 RX ADMIN — ACETAMINOPHEN 975 MG: 650 SUSPENSION ORAL at 14:22

## 2022-03-02 RX ADMIN — FAMOTIDINE 20 MG: 40 POWDER, FOR SUSPENSION ORAL at 17:12

## 2022-03-02 RX ADMIN — INSULIN LISPRO 4 UNITS: 100 INJECTION, SOLUTION INTRAVENOUS; SUBCUTANEOUS at 21:17

## 2022-03-02 RX ADMIN — LORAZEPAM 1 MG: 2 INJECTION INTRAMUSCULAR; INTRAVENOUS at 14:34

## 2022-03-02 RX ADMIN — LORAZEPAM 0.5 MG: 0.5 TABLET ORAL at 14:22

## 2022-03-02 RX ADMIN — PREGABALIN 75 MG: 75 CAPSULE ORAL at 08:31

## 2022-03-02 RX ADMIN — AMIODARONE HYDROCHLORIDE 200 MG: 200 TABLET ORAL at 08:31

## 2022-03-02 RX ADMIN — INSULIN LISPRO 2 UNITS: 100 INJECTION, SOLUTION INTRAVENOUS; SUBCUTANEOUS at 13:13

## 2022-03-02 RX ADMIN — TICAGRELOR 90 MG: 90 TABLET ORAL at 20:23

## 2022-03-02 RX ADMIN — IPRATROPIUM BROMIDE 0.5 MG: 0.5 SOLUTION RESPIRATORY (INHALATION) at 07:34

## 2022-03-02 RX ADMIN — IPRATROPIUM BROMIDE 0.5 MG: 0.5 SOLUTION RESPIRATORY (INHALATION) at 13:19

## 2022-03-02 RX ADMIN — INSULIN LISPRO 4 UNITS: 100 INJECTION, SOLUTION INTRAVENOUS; SUBCUTANEOUS at 08:34

## 2022-03-02 RX ADMIN — IPRATROPIUM BROMIDE 0.5 MG: 0.5 SOLUTION RESPIRATORY (INHALATION) at 20:14

## 2022-03-02 RX ADMIN — SODIUM CHLORIDE SOLN NEBU 3% 4 ML: 3 NEBU SOLN at 20:14

## 2022-03-02 RX ADMIN — APIXABAN 5 MG: 5 TABLET, FILM COATED ORAL at 08:45

## 2022-03-02 RX ADMIN — BUDESONIDE 0.5 MG: 0.5 INHALANT ORAL at 20:16

## 2022-03-02 RX ADMIN — METOPROLOL TARTRATE 25 MG: 25 TABLET, FILM COATED ORAL at 20:23

## 2022-03-02 RX ADMIN — ATORVASTATIN CALCIUM 40 MG: 40 TABLET, FILM COATED ORAL at 08:45

## 2022-03-02 RX ADMIN — INSULIN GLARGINE 10 UNITS: 100 INJECTION, SOLUTION SUBCUTANEOUS at 21:17

## 2022-03-02 RX ADMIN — LEVALBUTEROL HYDROCHLORIDE 1.25 MG: 1.25 SOLUTION, CONCENTRATE RESPIRATORY (INHALATION) at 07:34

## 2022-03-02 RX ADMIN — BUDESONIDE 0.5 MG: 0.5 INHALANT ORAL at 07:34

## 2022-03-02 RX ADMIN — LOSARTAN POTASSIUM 50 MG: 50 TABLET, FILM COATED ORAL at 08:45

## 2022-03-02 RX ADMIN — FAMOTIDINE 20 MG: 40 POWDER, FOR SUSPENSION ORAL at 08:47

## 2022-03-02 RX ADMIN — LEVALBUTEROL HYDROCHLORIDE 1.25 MG: 1.25 SOLUTION, CONCENTRATE RESPIRATORY (INHALATION) at 20:14

## 2022-03-02 RX ADMIN — INSULIN LISPRO 2 UNITS: 100 INJECTION, SOLUTION INTRAVENOUS; SUBCUTANEOUS at 17:13

## 2022-03-02 RX ADMIN — TICAGRELOR 90 MG: 90 TABLET ORAL at 08:45

## 2022-03-02 RX ADMIN — CHLORHEXIDINE GLUCONATE 0.12% ORAL RINSE 15 ML: 1.2 LIQUID ORAL at 08:44

## 2022-03-02 RX ADMIN — APIXABAN 5 MG: 5 TABLET, FILM COATED ORAL at 17:11

## 2022-03-02 RX ADMIN — FUROSEMIDE 40 MG: 10 INJECTION, SOLUTION INTRAMUSCULAR; INTRAVENOUS at 08:44

## 2022-03-02 RX ADMIN — INSULIN LISPRO 2 UNITS: 100 INJECTION, SOLUTION INTRAVENOUS; SUBCUTANEOUS at 08:34

## 2022-03-02 RX ADMIN — LEVALBUTEROL HYDROCHLORIDE 1.25 MG: 1.25 SOLUTION, CONCENTRATE RESPIRATORY (INHALATION) at 13:19

## 2022-03-02 RX ADMIN — QUETIAPINE FUMARATE 25 MG: 25 TABLET ORAL at 21:16

## 2022-03-02 RX ADMIN — SODIUM CHLORIDE SOLN NEBU 3% 4 ML: 3 NEBU SOLN at 13:19

## 2022-03-02 RX ADMIN — Medication 6 MG: at 21:16

## 2022-03-02 RX ADMIN — INSULIN LISPRO 4 UNITS: 100 INJECTION, SOLUTION INTRAVENOUS; SUBCUTANEOUS at 17:12

## 2022-03-02 RX ADMIN — INSULIN LISPRO 2 UNITS: 100 INJECTION, SOLUTION INTRAVENOUS; SUBCUTANEOUS at 13:14

## 2022-03-02 RX ADMIN — METOPROLOL TARTRATE 25 MG: 25 TABLET, FILM COATED ORAL at 08:45

## 2022-03-02 NOTE — PROGRESS NOTES
Progress Note - Pulmonary   Sherly Barajas 55 y o  female MRN: [id number]  Unit/Bed#: [address] [place] [address] Encounter: [id number]  Code Status: Level 1 - Full Code        Assessment/Plan:     Angel Adame is a 55 y o  Past Medical History:   Diagnosis Date    Anxiety     Aortic aneurysm ([address] )     Arthritis     Depression     Diabetes mellitus (HCC)     Fibromyalgia     GERD (gastroesophageal reflux disease)     GERD (gastroesophageal reflux disease)     H/O cardiovascular stress test 09/2018    no ischemia  EF 70%   H/O echocardiogram 01/2019    EF 60%  Mild LVH  trivial effusion   Hyperlipidemia     Hypertension     Migraines     Psychiatric disorder     anxiety    Uncontrolled hypertension 2/25/2015    Last Assessment & Plan:  BP today above goal <140/90, apparently asymptomatic  Prior BP elevations were attributed to not taking medication  Consider increased medication if persistent on f/u       28-year-old female 11/2021 had tracheostomy with PEG insertion 11/2021/ 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, 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  Did have 6 uncuffed Shiley tracheostomy on 01/13/22     Acute on chronic hypoxemic respiratory failure  -currently on 35% trach mask 8 L  Status post tracheostomy January 2022  -plan to transition a fenestrated trach 6 uncuffed and fenestrated trach today  Heavy smoking history of possible COPD  -nebulizer therapy per tracheostomy  -Xopenex/Atrovent      _________________________________________________    Subjective / 24 hour events:     No acute events overnight  Patient waiting for her trach placement  Pertinent labs Reviewed  Pertinent imaging Reviewed  Collaborative Discussion:  Case discussed with internal medicine team as well as Pulmonary attending for plan for trach change today    Tele Events:     Vitals:   Vitals:    03/02/22 0500 03/02/22 0524 03/02/22 0734 03/02/22 0843   BP:  124/77  126/78   Pulse: 63 62 65 68   Resp:  18 18 18   Temp:  (!) 97 3 °F (36 3 °C)  97 8 °F (36 6 °C)   TempSrc:       SpO2: 100% 99% 100% 96%   Weight:       Height:         Weight (last 2 days)     Date/Time Weight    22 0600 85 (187 39)    22 0600 85 (187 39)        Temp (24hrs), Av 1 °F (36 7 °C), Min:97 3 °F (36 3 °C), Max:98 8 °F (37 1 °C)  Current: Temperature: 97 8 °F (36 6 °C)          IV Infusions:       Nutrition:        Diet Orders   (From admission, onward)             Start     Ordered    22 0842  Dietary nutrition supplements  Once        Question Answer Comment   Select Supplement: Glucerna-Chocolate    Frequency Breakfast, Lunch, Dinner        22 9052    22 0349  Diet Cardiovascular; Sodium 2 GM; Consistent Carbohydrate Diet Level 2 (5 carb servings/75 grams CHO/meal)  Diet effective now        References:    Nutrtion Support Algorithm Enteral vs  Parenteral   Question Answer Comment   Diet Type Cardiovascular    Cardiac Sodium 2 GM    Other Restriction(s): Consistent Carbohydrate Diet Level 2 (5 carb servings/75 grams CHO/meal)    RD to adjust diet per protocol? Yes        22 0348    22 1100  Room Service  Once        Question:  Type of Service  Answer:  Room Service-Appropriate    22 1059                Ins/Outs:   I/O        07 0700  0701   0700  0701   0700    P  O   120     I V  (mL/kg)  10 (0 1)     Total Intake(mL/kg)  130 (1 5)     Urine (mL/kg/hr) 400 (0 2) 1100 (0 5)     Total Output 400 1100     Net -400 -970                  Lines/Drains:  Invasive Devices  Report    Peripheral Intravenous Line            Peripheral IV 22 Right Forearm 3 days          Airway            Surgical Airway 5 days                 Active medications:  Scheduled Meds:  Current Facility-Administered Medications   Medication Dose Route Frequency Provider Last Rate    acetaminophen  975 mg Per G Tube Q6H PRN Roz SpironellNEELAM Schreiber      albuterol  2 5 mg Nebulization Q4H PRN NEELAM Golden      amiodarone  200 mg Oral Daily With Breakfast Roz Levynello V, CRNP      apixaban  5 mg Oral BID Roz Spironello V, CRNP      atorvastatin  40 mg Oral Daily Roz Spironello V, CRNP      budesonide  0 5 mg Nebulization Q12H Roz Spironello V, CRNP      chlorhexidine  15 mL Mouth/Throat Q12H [address] Roz Spironello V, CRNP      famotidine  20 mg Oral BID Roz Levynello V, CRNP      furosemide  40 mg Intravenous Daily Hugo Logan MD      insulin glargine  8 Units Subcutaneous HS Delicia Jesus MD      insulin lispro  1-6 Units Subcutaneous HS Roz Spironello V, CRNP      insulin lispro  2 Units Subcutaneous TID With Meals Delicia Jesus MD      insulin lispro  2-12 Units Subcutaneous TID AC Roz Spironello V, CRNP      ipratropium  0 5 mg Nebulization TID Columba Crenshaw V, CRNP      levalbuterol  1 25 mg Nebulization TID Columba Crenshaw V, CRNP      LORazepam  0 5 mg Oral BID PRN Roz Spironello V, CRNP      losartan  50 mg Oral Daily Roz Camnello V, CRNP      melatonin  6 mg Oral HS Roz Camnello V, CRNP      metoprolol tartrate  25 mg Oral Q12H [address] Roz Spironello V, CRNP      ondansetron  4 mg Intravenous Q6H PRN Roz Spironello V, CRNP      polyethylene glycol  17 g Oral Daily Roz Spironello V, CRNP      pregabalin  75 mg Oral Daily Roz Spironello V, CRNP      QUEtiapine  25 mg Oral HS Roz Spironello V, CRNP      senna-docusate sodium  1 tablet Oral HS Roz Spironello V, CRNP      sodium chloride  4 mL Nebulization TID Rylee Amos DO      ticagrelor  90 mg Oral Q12H [address] Roz Spironello V, CRNP       PRN Meds:acetaminophen, 975 mg, Q6H PRN  albuterol, 2 5 mg, Q4H PRN  LORazepam, 0 5 mg, BID PRN  ondansetron, 4 mg, Q6H PRN      ____________________________________________________________________    Physical Exam  Constitutional:       Appearance: She is well-developed     HENT:      Head: Normocephalic and atraumatic  Eyes:      Conjunctiva/sclera: Conjunctivae normal       Pupils: Pupils are equal, round, and reactive to light  Neck:        Comments: Stable 6  DCT Shiley   Cardiovascular:      Rate and Rhythm: Normal rate and regular rhythm  Heart sounds: Normal heart sounds  Pulmonary:      Effort: Pulmonary effort is normal  No respiratory distress  Breath sounds: Normal breath sounds  No wheezing or rales  Chest:      Chest wall: No tenderness  Abdominal:      General: Bowel sounds are normal       Palpations: Abdomen is soft  Musculoskeletal:         General: Normal range of motion  Cervical back: Normal range of motion and neck supple  Skin:     General: Skin is warm and dry  Neurological:      Mental Status: She is alert and oriented to person, place, and time         ____________________________________________________________________    Invasive/non-invasive ventilation settings   Respiratory  Report   Lab Data (Last 4 hours)    None         O2/Vent Data (Last 4 hours)    None                Laboratory and Diagnostics:  Results from last 7 days   Lab Units 03/01/22  0507 02/28/22  0510 02/27/22  0545 02/26/22  0737 02/25/22  0551 02/24/22  0518 02/23/22  2038   WBC Thousand/uL 8 50 8 63 8 23 8 09 8 09 8 60 9 86   HEMOGLOBIN g/dL 8 7* 8 5* 8 0* 8 0* 7 9* 7 7* 10 4*   HEMATOCRIT % 30 2* 28 5* 26 7* 27 4* 27 0* 25 4* 34 0*   PLATELETS Thousands/uL 363 334 330 305 287 288 392*   NEUTROS PCT %  --  64  --   --   --  71 69   MONOS PCT %  --  8  --   --   --  7 7     Results from last 7 days   Lab Units 03/01/22  0507 02/28/22  0510 02/27/22  0545 02/26/22  0737 02/25/22  0551 02/24/22  0518 02/23/22  2038   SODIUM mmol/L 141 142 143 143 145 144 145   POTASSIUM mmol/L 3 8 4 1 4 5 3 7 3 9 3 1* 3 3*   CHLORIDE mmol/L 103 106 107 105 110* 111* 108   CO2 mmol/L 31 28 28 28 27 25 24   ANION GAP mmol/L 7 8 8 10 8 8 13   BUN mg/dL 25 21 14 15 14 12 13   CREATININE mg/dL 1 18 1  02 0 86 0 87 0 90 0 81 0 91   CALCIUM mg/dL 8 4 8 2* 8 1* 8 1* 8 2* 7 9* 9 1   GLUCOSE RANDOM mg/dL 195* 159* 188* 161* 153* 197* 205*   ALT U/L  --   --   --   --   --  18 13   AST U/L  --   --   --   --   --  12 14*   ALK PHOS U/L  --   --   --   --   --  83 92 1   ALBUMIN g/dL  --   --   --   --   --  2 6* 3 9   TOTAL BILIRUBIN mg/dL  --   --   --   --   --  0 54 0 69     Results from last 7 days   Lab Units 03/01/22  0507 02/28/22  0510 02/27/22  0545 02/26/22  0737 02/25/22  0551 02/24/22  0518 02/23/22 2038   MAGNESIUM mg/dL 2 1 2 1 2 2 2 0 2 3 1 9 1 5*   PHOSPHORUS mg/dL  --   --   --   --  4 7* 4 1  --       Results from last 7 days   Lab Units 02/23/22 2038   INR  1 32*   PTT seconds 25          Results from last 7 days   Lab Units 02/23/22  2220 02/23/22 2038   LACTIC ACID mmol/L 1 6 2 4*     ABG:    VBG:  Results from last 7 days   Lab Units 02/23/22 2038   PH FRANCO  7 367   PCO2 FRANCO mm Hg 43 1   PO2 FRANCO mm Hg 35 0   HCO3 FRANCO mmol/L 24 2   BASE EXC FRANCO mmol/L -1 2     Results from last 7 days   Lab Units 02/25/22  0551 02/24/22  0518   PROCALCITONIN ng/ml 0 13 0 10       Micro  Results from last 7 days   Lab Units 02/24/22  0521 02/24/22  0519 02/24/22  0114 02/23/22 2038   BLOOD CULTURE  No Growth After 5 Days  No Growth After 5 Days  --  No Growth After 5 Days  No Growth at 24 hrs  MRSA CULTURE ONLY   --   --  No Methicillin Resistant Staphlyococcus aureus (MRSA) isolated  --        Imaging:   XR chest portable   Final Result by Agus Taylor MD (03/01 0940)      Improvement of interstitial pulmonary edema since 2/23/2022  Right lower lobe subsegmental atelectasis  Probable small left basilar pleural effusion  Workstation performed: [id number]             Micro:   Lab Results   Component Value Date    BLOODCX No Growth After 5 Days  02/24/2022    BLOODCX No Growth After 5 Days   02/24/2022    BLOODCX No Growth at 24 hrs  02/23/2022    BLOODCX No Growth After 5 Days   02/23/2022    URINECX >100,000 cfu/ml Escherichia coli (A) 10/29/2021    URINECX >100,000 cfu/ml Escherichia coli (A) 09/28/2021    URINECX >100,000 cfu/ml Lactobacillus species (A) 09/28/2021    SPUTUMCULTUR (A) 10/30/2021     3+ Growth of Methicillin Resistant Staphylococcus aureus    SPUTUMCULTUR [place] of  10/30/2021    MRSACULTURE  02/24/2022     No Methicillin Resistant Staphlyococcus aureus (MRSA) isolated            Invalid input(s): Nick Bowling

## 2022-03-02 NOTE — PHYSICAL THERAPY NOTE
PT TREATMENT     03/02/22 0955   Note Type   Note Type Treatment   Pain Assessment   Pain Assessment Tool 0-10   Pain Score No Pain   Restrictions/Precautions   Other Precautions Fall Risk;O2   General   Chart Reviewed Yes   Cognition   Arousal/Participation Cooperative   Subjective   Subjective "tired"   Transfers   Sit to Stand 4  Minimal assistance   Stand to Sit 4  Minimal assistance   Stand pivot 4  Minimal assistance   Additional items   (rolling walker)   Ambulation/Elevation   Gait pattern   (L knee recurvatum)   Gait Assistance 4  Minimal assist   Assistive Device Rolling walker  (6L02 with tubing supplied by RT for use with 02 tank/trach collar )   Distance 30 feet with one standing rest break   Balance   Static Sitting Good   Dynamic Sitting Fair +   Static Standing Fair +   Dynamic Standing Fair -   Ambulatory Fair -   Activity Tolerance   Activity Tolerance Patient limited by fatigue;Patient tolerated treatment well   Assessment   Prognosis Good   Problem List Decreased strength;Decreased endurance; Impaired balance;Decreased mobility   Assessment Pt demonstrates overall improvement in endurance and function but reports she was "tired" today  Pt is very focused on her phone and needs lots of time/cues to refocus on PT  Anticipate pt is capable of more activity than she demonstrated today  The patient's [facility] Basic Mobility Inpatient Short Form Raw Score is 19  A Raw score of greater than 16 suggests the patient may benefit from discharge to home  Plan   Treatment/Interventions ADL retraining;Functional transfer training;LE strengthening/ROM; Elevations; Therapeutic exercise; Endurance training;Gait training;Equipment eval/education;Patient/family training   Progress Progressing toward goals   PT Frequency Other (Comment)  (5w)   Recommendation   PT Discharge Recommendation Home with home health rehabilitation   Equipment Recommended   (pt has a walker and 02)   [facility]   Turning in Bed Without Bedrails 4   Lying on Back to Sitting on Edge of Flat Bed 4   Moving Bed to Chair 3   Standing Up From Chair 3   Walk in Room 3   Climb 3-5 Stairs 2   Basic Mobility Inpatient Raw Score 19   Basic Mobility Standardized Score 42 48   Highest Level Of Mobility   [facility] Goal 6: Walk 10 steps or more   End of Consult   Patient Position at End of Consult All needs within reach; Bedside chair   Licensure   [place] License Number  [place] Peaceace   [id number]

## 2022-03-02 NOTE — PROCEDURES
Tracheostomy Replacement    Date/Time: 3/2/2022 2:40 PM  Performed by: Giselle Mendes PA-C  Authorized by: Giselle Mendes PA-C     Patient Location:  Bedside  Other Assisting Provider: Yes (comment) (Dr Micheal Mallory)    Verbal consent obtained?: Yes    Risks and benefits: Risks, benefits and alternatives were discussed    Consent given by:  Patient  Patient states understanding of procedure being performed: Yes    Patient's understanding of procedure matches consent: Yes    Procedure consent matches procedure scheduled: Yes    Relevant documents present and verified: Yes    Test results available and properly labeled: Yes    Patient identity confirmed:  Verbally with patient and arm band  Time out: Immediately prior to the procedure a time out was called    Indications:  Routine  Procedure type:  Tube change  Tracheostomy status: Fistula tract established?: Yes    Local anesthesia used?: No    Tube type:  Fenestrated  Tube cuff:  Cuffless  Tube size (mm):  6 0  Approach:  External  Approach location:  External  Patient tolerance:  Patient tolerated the procedure well with no immediate complications

## 2022-03-02 NOTE — PLAN OF CARE
Problem: PAIN - ADULT  Goal: Verbalizes/displays adequate comfort level or baseline comfort level  Description: Interventions:  - Encourage patient to monitor pain and request assistance  - Assess pain using appropriate pain scale  - Administer analgesics based on type and severity of pain and evaluate response  - Implement non-pharmacological measures as appropriate and evaluate response  - Consider cultural and social influences on pain and pain management  - Notify physician/advanced practitioner if interventions unsuccessful or patient reports new pain  Outcome: Progressing     Problem: INFECTION - ADULT  Goal: Absence or prevention of progression during hospitalization  Description: INTERVENTIONS:  - Assess and monitor for signs and symptoms of infection  - Monitor lab/diagnostic results  - Monitor all insertion sites, i e  indwelling lines, tubes, and drains  - Monitor endotracheal if appropriate and nasal secretions for changes in amount and color  - [place] appropriate cooling/warming therapies per order  - Administer medications as ordered  - Instruct and encourage patient and family to use good hand hygiene technique  - Identify and instruct in appropriate isolation precautions for identified infection/condition  Outcome: Progressing

## 2022-03-02 NOTE — ASSESSMENT & PLAN NOTE
Extensive cardiac history with multiple recent hospitalization   Status post prolonged hospitalization at [facility] secondary to STEMI 10/22/2021 status post PATRICA to mid circumflex complicated by cardiogenic shock and cardiac arrest secondary to VT x2  Patient was discharged to rehab with tracheostomy and PEG tube  Subsequently hospitalized at [facility] on 01/01/2022 from [facility] with cardiac arrest   Pre-hospital ROSC after AED shock x1  Recommended ICD placement by Cardiology but deferred due to infected right lung bullae, status post antibiotic treatment   Subsequently patient was discharged from [facility] on life vest on 02/10 with plan for elective ICD placement in 4 weeks    Patient denies any subsequent shocks/events  · Continue amiodarone  · Plan for outpatient EP followup as per Cardiology recs  · Monitor electrolytes

## 2022-03-02 NOTE — ASSESSMENT & PLAN NOTE
Lab Results   Component Value Date    HGBA1C 6 6 (H) 01/01/2022       Recent Labs     03/01/22  2030 03/02/22  0725 03/02/22  1106 03/02/22  1542   POCGLU 234* 202* 188* 243*       Blood Sugar Average: Last 72 hrs:  (P) 223 2     Patient was discharged to rehab on Basaglar 8 units daily and Apidra 2 units t i d   Lantus 10 units and lispro 2 units before meals   Continue sliding scale

## 2022-03-02 NOTE — PROGRESS NOTES
Progress Note - Cardiology   [facility]     Anisha Bhatt 55 y o  female MRN: [id number]  : 1965  Unit/Bed#: [address] [place] [address] Encounter: [id number]    Assessment and Plan:     41-year-old lady with history of coronary artery disease status post inferior lateral wall infarction with prolonged hospital stay in October and 2021 at [facility] and later on had a prolonged stay at [facility]   At 2021 her EF was around 40% with grade 2 diastolic dysfunction  She had also history of atrial fibrillation  Current EF now around 50% with mild MR  In the hospital set up she had episodes of VT and cardiac arrest and at 1 point was recommended to have ICD placed however it was not placed as she was found to have a right bolus lesion in the lung/abscess and was on antibiotics  She has been on systemic anticoagulation therapy for atrial fibrillation  She still feels that her tracheostomy tube is small and she had a subglottis stenosis  Repeat echo Doppler from [facility] reviewed  Her CT scan has shown some evidence of emphysema  There has been some issues of noncompliant  She has been treated and followed by Pulmonary and she was initially in the ICU  1  Recent acute hypoxic respiratory failure now much improved  She has been on nebulizers  Trach collar and tracheostomy management as per Pulmonary  2  Acute on chronic systolic and diastolic heart failure with current EF around 50% with mild MR and moderate pulmonary hypertension  She has wall motion abnormality involving inferior lateral wall consistent with her old lateral infarction  She can go back to her maintenance diuretics  She was on Lasix 40 mg daily  3  Coronary artery disease status post cardiac catheterization  Initially admitted with STEMI in at 2021 and had a stent placed in mid circumflex into OM1  OM2 was ballooned but not stented    Later on she has a cardiac arrest and had a repeat catheterization found to have apical LAD complete occlusion was felt to be small to be intervened  Continue medical Rx  No symptoms of angina    4  Paroxysmal atrial fibrillation currently in sinus rhythm with right bundle branch block  Continue Eliquis she is on amiodarone  Amiodarone 200 mg daily for 2 week then change it to 100 mg daily   She need to follow up with EP  5  History of ventricular tachycardia  Cardiology note from [facility] reviewed  She had cardiac history with multiple recent hospitalization in [facility] as well as at [facility]   She has no episodes of VT here she is on amiodarone  Will need EP evaluation for ICD  She was discharged from [facility] with on life vest in February 10 with planned to have elective ICD  Spoke to EP attending at [facility]  Since patient has no further episodes of ventricular tachycardia  And her episodes of cardiac arrest happened in the setting of MI they feel it can be done as an outpatient and would like to follow with her as an outpatient  Spoke to patient she agree with the plan  Make sure patient has electrophysiology appointment with Dr Merced Sky at [facility]  6  Dyslipidemia on statin    7  Status post tracheostomy  Management as per Pulmonary  Plan  Continue beta-blockers  Continue statins  Lasix can be changed to p o  20 mg daily  EP follow-up with Dr Darren Ramirez as an outpatient as Juno spoke to him  Keep magnesium around 2 and potassium around 4  Continue Rx for COPD/emphysema and trach collar as per Pulmonary  Follow-up electrolytes  Subjective / Objective:   Patient seen and evaluated  She feels well  She is walking with the rehab nurse  Pulmonary note noted  No new cardiovascular issues  Monitor shows no evidence of any significant tachy-dinora arrhythmias  Vitals: Blood pressure 126/78, pulse 68, temperature 97 8 °F (36 6 °C), resp   rate 18, height 5' 10" (1 778 m), weight 85 kg (187 lb 6 3 oz), SpO2 96 %  Vitals:    02/28/22 0600 03/01/22 0600   Weight: 85 kg (187 lb 6 3 oz) 85 kg (187 lb 6 3 oz)     Body mass index is 26 89 kg/m²  BP Readings from Last 3 Encounters:   03/02/22 126/78   02/23/22 149/82   02/21/22 155/75     Orthostatic Blood Pressures      Most Recent Value   Blood Pressure 126/78 filed at 03/02/2022 0843   Patient Position - Orthostatic VS Lying filed at 02/28/2022 0727        I/O       02/26 0701  02/27 0700 02/27 0701  02/28 0700 02/28 0701  03/01 0700    P  O        I V  (mL/kg)       Total Intake(mL/kg)       Urine (mL/kg/hr) 2100 (1) 1700 (0 8)     Stool       Total Output 2100 1700     Net -2100 -1700                Invasive Devices  Report    Peripheral Intravenous Line            Peripheral IV 02/27/22 Right Forearm 2 days          Airway            Surgical Airway 5 days                  Intake/Output Summary (Last 24 hours) at 3/2/2022 0950  Last data filed at 3/2/2022 0601  Gross per 24 hour   Intake 130 ml   Output 500 ml   Net -370 ml         Physical Exam:   Physical Exam  Constitutional:       General: She is not in acute distress  Appearance: She is well-developed  She is not diaphoretic  Neck:      Thyroid: No thyromegaly  Vascular: No JVD  Trachea: Tracheostomy present  Comments: Trach collar in place  Cardiovascular:      Rate and Rhythm: Normal rate and regular rhythm  Heart sounds: S1 normal and S2 normal  Heart sounds not distant  Murmur heard  Systolic (ejection) murmur is present with a grade of 2/6  No friction rub  No gallop  No S3 or S4 sounds  Pulmonary:      Effort: Pulmonary effort is normal  No respiratory distress  Breath sounds: No wheezing or rales  Comments: Bilateral air entry with coarse occasional rhonchi  Chest:      Chest wall: No tenderness  Abdominal:      General: Bowel sounds are normal  There is no distension  Palpations: Abdomen is soft  Tenderness: There is no abdominal tenderness  Musculoskeletal:         General: No deformity  Cervical back: Neck supple  Comments: No lower extremity edema   Skin:     General: Skin is warm and dry  Coloration: Skin is not pale  Findings: No rash  Neurological:      Mental Status: She is alert and oriented to person, place, and time     Psychiatric:         Behavior: Behavior normal          Judgment: Judgment normal            Medications/ Allergies:     Current Facility-Administered Medications   Medication Dose Route Frequency Provider Last Rate    acetaminophen  975 mg Per G Tube Q6H PRN Roz Spironello V, CRNP      albuterol  2 5 mg Nebulization Q4H PRN Roz Spironello V, CRNP      amiodarone  200 mg Oral Daily With Breakfast Roz Levynello V, CRNP      apixaban  5 mg Oral BID Roz Spirocarloso V, CRNP      atorvastatin  40 mg Oral Daily Roz Simmonso V, CRNP      budesonide  0 5 mg Nebulization Q12H Roz Levynello V, CRNP      chlorhexidine  15 mL Mouth/Throat Q12H [address] Roz Simmonso V, CRNP      famotidine  20 mg Oral BID Roz Levynello V, CRNP      furosemide  40 mg Intravenous Daily Leobardo Kingston MD      insulin glargine  8 Units Subcutaneous HS Keri Begum MD      insulin lispro  1-6 Units Subcutaneous HS Roz Simmonso JOLEEN, NEELAM      insulin lispro  2 Units Subcutaneous TID With Meals Keri Begum MD      insulin lispro  2-12 Units Subcutaneous TID AC Roz Simmonso V, NEELAM      ipratropium  0 5 mg Nebulization TID Sophie Hum V, TRISHNP      levalbuterol  1 25 mg Nebulization TID Sophie Hum V, CRNP      LORazepam  0 5 mg Oral BID PRN Roz Spironello V, CRNP      losartan  50 mg Oral Daily Roz Spironello V, CRNP      melatonin  6 mg Oral HS Roz Simmonso V, TRISHNP      metoprolol tartrate  25 mg Oral Q12H [address] Roz Simmonso V, CRNP      ondansetron  4 mg Intravenous Q6H PRN Roz Camnello V, CRNP      polyethylene glycol  17 g Oral Daily Rozaristides Simmonso V, CRNP      pregabalin  75 mg Oral Daily Rozaristides Simmonso V, CRNP      QUEtiapine  25 mg Oral HS Roz Simmonso V, CRNP      senna-docusate sodium  1 tablet Oral HS Rozaristides Simmonso V, CRNP      sodium chloride  4 mL Nebulization TID Rylee Reveloisaar,       ticagrelor  90 mg Oral Q12H [facility] Rozaristides Simmonso V, TRISHNP       acetaminophen, 975 mg, Q6H PRN  albuterol, 2 5 mg, Q4H PRN  LORazepam, 0 5 mg, BID PRN  ondansetron, 4 mg, Q6H PRN      Allergies   Allergen Reactions    Metformin Diarrhea    Clonidine Rash     Patch          Labs:   Troponins:  Results from last 7 days   Lab Units 02/23/22  2220   HSTNI D2 ng/L -2         CBC with diff:  Results from last 7 days   Lab Units 03/01/22  0507 02/28/22  0510 02/27/22  0545 02/26/22  0737 02/25/22  0551 02/24/22  0518 02/23/22  2038   WBC Thousand/uL 8 50 8 63 8 23 8 09 8 09 8 60 9 86   HEMOGLOBIN g/dL 8 7* 8 5* 8 0* 8 0* 7 9* 7 7* 10 4*   HEMATOCRIT % 30 2* 28 5* 26 7* 27 4* 27 0* 25 4* 34 0*   MCV fL 85 85 85 85 85 85 83   PLATELETS Thousands/uL 363 334 330 305 287 288 392*   MCH pg 24 4* 25 2* 25 5* 24 8* 24 9* 25 8* 25 5*   MCHC g/dL 28 8* 29 8* 30 0* 29 2* 29 3* 30 3* 30 6*   RDW % 18 8* 18 6* 18 9* 18 8* 19 3* 18 7* 18 7*   MPV fL 10 9 10 5 10 9 10 8 11 0 11 2 11 6   NRBC AUTO /100 WBCs  --  0  --   --   --  0 0       CMP:  Results from last 7 days   Lab Units 03/01/22  0507 02/28/22  0510 02/27/22  0545 02/26/22  0737 02/25/22  0551 02/24/22  0518 02/23/22 2038   SODIUM mmol/L 141 142 143 143 145 144 145   POTASSIUM mmol/L 3 8 4 1 4 5 3 7 3 9 3 1* 3 3*   CHLORIDE mmol/L 103 106 107 105 110* 111* 108   CO2 mmol/L 31 28 28 28 27 25 24   ANION GAP mmol/L 7 8 8 10 8 8 13   BUN mg/dL 25 21 14 15 14 12 13   CREATININE mg/dL 1 18 1 02 0 86 0 87 0 90 0 81 0 91   CALCIUM mg/dL 8 4 8 2* 8 1* 8 1* 8 2* 7 9* 9 1   AST U/L  --   --   --   --   --  12 14*   ALT U/L --   --   --   --   --  18 13   ALK PHOS U/L  --   --   --   --   --  83 92 1   TOTAL PROTEIN g/dL  --   --   --   --   --  6 0* 7 6   ALBUMIN g/dL  --   --   --   --   --  2 6* 3 9   TOTAL BILIRUBIN mg/dL  --   --   --   --   --  0 54 0 69   EGFR ml/min/1 73sq m 52 62 76 75 72 82 71       Magnesium:  Results from last 7 days   Lab Units 03/01/22  0507 02/28/22  0510 02/27/22  0545 02/26/22  0737 02/25/22  0551 02/24/22  0518 02/23/22 2038   MAGNESIUM mg/dL 2 1 2 1 2 2 2 0 2 3 1 9 1 5*     Coags:  Results from last 7 days   Lab Units 02/23/22 2038   PTT seconds 25   INR  1 32*         Imaging & Testing   I have personally reviewed pertinent reports  XR chest 1 view portable    Result Date: 2/24/2022  Narrative: CHEST INDICATION:   sob  COMPARISON:  Chest radiograph 2/21/2022  EXAM PERFORMED/VIEWS:  XR CHEST PORTABLE FINDINGS:  Lesser extent tracheostomy unchanged  Heart shadow is enlarged but unchanged from prior exam  Stable mild pulmonary edema  Stable small left pleural effusion  No pneumothorax  Osseous structures appear within normal limits for patient age  Impression: Stable mild pulmonary edema and small left pleural effusion  Workstation performed: [id number]       CTA ED chest PE Study    Result Date: 2/23/2022  Narrative: CTA - CHEST WITH IV CONTRAST - PULMONARY ANGIOGRAM INDICATION:   Shortness of breath, + dimer, trach, CHF  COMPARISON: CT of the chest abdomen pelvis on November 14, 2021  TECHNIQUE: CTA examination of the chest was performed using angiographic technique according to a protocol specifically tailored to evaluate for pulmonary embolism  Axial, sagittal, and coronal 2D reformatted images were created from the source data and  submitted for interpretation  In addition, coronal 3D MIP postprocessing was performed on the acquisition scanner  Radiation dose length product (DLP) for this visit:  468 6 mGy-cm     This examination, like all CT scans performed in the [facility], was performed utilizing techniques to minimize radiation dose exposure, including the use of iterative reconstruction and automated exposure control  IV Contrast:  100 mL of iohexol (OMNIPAQUE)  FINDINGS: PULMONARY ARTERIAL TREE:  No pulmonary embolus is seen  LUNGS: Tracheostomy tube in place  Hypoventilatory changes of lungs  Mild diffuse groundglass opacity within the lungs may be due to pulmonary edema  There are patchy opacities at the bilateral lower lobe bases and within the posterior left upper lobe  which may be due to pneumonia in the appropriate clinical setting  PLEURA:  Small bilateral pleural effusions  HEART/GREAT VESSELS:  Cardiomegaly  No pericardial effusion  Coronary artery calcifications  No thoracic aortic aneurysm  MEDIASTINUM AND REECE:  Mediastinal lymph nodes measure up to 9 mm in short axis  CHEST WALL AND LOWER NECK:   Unremarkable  VISUALIZED STRUCTURES IN THE UPPER ABDOMEN:  Unremarkable  OSSEOUS STRUCTURES: No acute fracture  Redemonstrated old sternal and bilateral rib fractures  Impression: 1  No evidence of pulmonary embolism  2   Mild diffuse ground glass opacities in the lungs may be due to pulmonary edema  Small bilateral pleural effusions  Cardiomegaly  Correlate for heart failure  3   Patchy opacities at the bilateral lower lobe bases and within the posterior left upper lobe which may be due to atelectasis and/or pneumonia in the appropriate clinical setting  Workstation performed: [id number]     Echo complete w/ contrast if indicated    Result Date: 2/24/2022  Narrative: Wash Caller  Left Ventricle: Left ventricular cavity size is normal  Wall thickness is moderately increased  Systolic function is normal   Estimated ejection fraction is 50%  There is moderate hypokinesis of the inferior and the basal anterolateral walls  Diastolic function is normal  There is moderate concentric hypertrophy    Left Atrium: The atrium is mildly dilated     Right Atrium: The atrium is mildly dilated    Tricuspid Valve: There is mild regurgitation  The right ventricular systolic pressure is mildly to moderately elevated at 54 mm Hg    Aorta: Aortic root is mildly dilated  Consider CTA for more accurate evaluation    Pericardium: There is a trivial pericardial effusion  There is no echocardiographic evidence of tamponade  There is a left pleural effusion    Changes include: Ejection fraction has improved  RV systolic pressure is elevated  EKG / Monitor: Personally reviewed  Admission EKG shows normal sinus rhythm rhythm right bundle branch block lateral wall infarction  Monitor shows no evidence of significant tachy-dinora arrhythmias  Dr Kong Hernandez MD [facility]      "This note has been constructed using a voice recognition system  Therefore there may be syntax, spelling, and/or grammatical errors   Please call if you have any questions  "

## 2022-03-02 NOTE — ASSESSMENT & PLAN NOTE
History of STEMI 5, hospitalized at [facility] 10/22-11/23  Status post mid LCX culprit lesion stent at bifurcation of OM2    Mild nonobstructive disease of LAD and RCA   · Continue Brilinta, statin, Eliquis

## 2022-03-02 NOTE — ASSESSMENT & PLAN NOTE
Wt Readings from Last 3 Encounters:   03/01/22 85 kg (187 lb 6 3 oz)   11/23/21 87 5 kg (192 lb 14 4 oz)   11/04/21 91 3 kg (201 lb 4 5 oz)     History of ischemic cardiomyopathy, awaiting AICD  Echocardiogram 10/21 - EF 40%, akinesis of inferior and anterolateral LV  CTA chest 2/23 - bilateral effusion and bilateral opacity secondary to pulmonary edema   Echocardiogram - 02/24/2022 - EF 50%, moderate hypokinesis of inferior and basal anterolateral wall  · Continue IV Lasix  · Monitor intake output, daily weights  · Cardiology following, input appreciated

## 2022-03-02 NOTE — PROGRESS NOTES
[facility] 46  Progress Note - Godfrey Prophet 1965, 55 y o  female MRN: [id number]  Unit/Bed#: [address] [address] Encounter: [id number]  Primary Care Provider: Rosaura Solis MD   Date and time admitted to hospital: 2/24/2022 12:26 AM    * Acute on chronic respiratory failure with hypoxia [facility])  Assessment & Plan  History of respiratory failure status post tracheostomy and PEG tube placement  On 10 L FiO2 via trach collar at baseline   Presented to ED 2/22 with shortness of breath noted, admission was advised but patient requested to leave   Presented again to [facility] on 02/23 with acute shortness of breath   Noted to have SpO2 89-92% on arrival, placed on PS 10/6/50% after trach exchange to cuffed trach  CTA of the chest - no pulmonary embolism, diffuse ground-glass opacities may be due to pulmonary edema  Small bilateral pleural effusion  Cardiomegaly   SARS-CoV-2 influenza PCR negative   Respiratory status improved with diuresis  · Currently on trach collar on 35% FiO2  · Chest x-ray with improvement of pulmonary edema   · Pulmonary and Cardiology  following     Acute on chronic combined systolic and diastolic heart failure (HCC)  Assessment & Plan  Wt Readings from Last 3 Encounters:   03/01/22 85 kg (187 lb 6 3 oz)   11/23/21 87 5 kg (192 lb 14 4 oz)   11/04/21 91 3 kg (201 lb 4 5 oz)     History of ischemic cardiomyopathy, awaiting AICD  Echocardiogram 10/21 - EF 40%, akinesis of inferior and anterolateral LV  CTA chest 2/23 - bilateral effusion and bilateral opacity secondary to pulmonary edema   Echocardiogram - 02/24/2022 - EF 50%, moderate hypokinesis of inferior and basal anterolateral wall  · Continue IV Lasix  · Monitor intake output, daily weights  · Cardiology following, input appreciated         A-fib [facility])  Assessment & Plan  On amiodarone and Eliquis    Subglottic stenosis  Assessment & Plan  History of difficult EGD placement prior to respiratory arrest on 01/14/22  Status post tracheostomy placement  · Continue trach care   · Follow-up speech therapy   · Pulmonary follow-up     CAD (coronary artery disease)  Assessment & Plan  History of STEMI 5, hospitalized at [facility] 10/22-11/23  Status post mid LCX culprit lesion stent at bifurcation of OM2  Mild nonobstructive disease of LAD and RCA   · Continue Brilinta, statin, Eliquis      History of Cardiac arrest [facility])  Assessment & Plan  As above    History of Ventricular tachycardia [facility])  Assessment & Plan  Extensive cardiac history with multiple recent hospitalization   Status post prolonged hospitalization at [facility] secondary to STEMI 10/22/2021 status post PATRICA to mid circumflex complicated by cardiogenic shock and cardiac arrest secondary to VT x2  Patient was discharged to rehab with tracheostomy and PEG tube  Subsequently hospitalized at [facility] on 01/01/2022 from [facility] with cardiac arrest   Pre-hospital ROSC after AED shock x1  Recommended ICD placement by Cardiology but deferred due to infected right lung bullae, status post antibiotic treatment   Subsequently patient was discharged from [facility] on life vest on 02/10 with plan for elective ICD placement in 4 weeks    Patient denies any subsequent shocks/events  · Continue amiodarone  · Plan for outpatient EP followup as per Cardiology recs  · Monitor electrolytes    Uncontrolled type 2 diabetes mellitus with complication, with long-term current use of insulin [facility])  Assessment & Plan  Lab Results   Component Value Date    HGBA1C 6 6 (H) 01/01/2022       Recent Labs     03/01/22  2030 03/02/22  0725 03/02/22  1106 03/02/22  1542   POCGLU 234* 202* 188* 243*       Blood Sugar Average: Last 72 hrs:  (P) 223 2     Patient was discharged to rehab on Basaglar 8 units daily and Apidra 2 units t i d   Lantus 10 units and lispro 2 units before meals   Continue sliding scale     Hypertension  Assessment & Plan  Home medication Cozaar, Lasix, metoprolol Stable on current meds  · Continue diuresis     Hyperlipidemia associated with type 2 diabetes mellitus ([facility] [address] )  Assessment & Plan  Continue statin    Gastroesophageal reflux disease with esophagitis  Assessment & Plan  On Pepcid    Anxiety  Assessment & Plan  On lorazepam p r n  VTE Pharmacologic Prophylaxis: Eliquis    Patient Centered Rounds: I performed bedside rounds with nursing staff today  Discussions with Specialists or Other Care Team Provider: Yes    Education and Discussions with Family / Patient: Yes    Time Spent for Care: 45 minutes  More than 50% of total time spent on counseling and coordination of care as described above  Current Length of Stay: 6 day(s)  Current Patient Status: Inpatient   Certification Statement: The patient will continue to require additional inpatient hospital stay due to trach replacement  Discharge Plan: Anticipate discharge tomorrow to home  Code Status: Level 1 - Full Code    Subjective:   No acute events overnight  Patient denies any acute complaints    Objective:     Vitals:   Temp (24hrs), Av 6 °F (36 4 °C), Min:96 3 °F (35 7 °C), Max:98 5 °F (36 9 °C)    Temp:  [96 3 °F (35 7 °C)-98 5 °F (36 9 °C)] 96 3 °F (35 7 °C)  HR:  [61-79] 70  Resp:  [18-20] 18  BP: (105-126)/(53-78) 119/62  SpO2:  [96 %-100 %] 96 %  Body mass index is 26 89 kg/m²  Input and Output Summary (last 24 hours): Intake/Output Summary (Last 24 hours) at 3/2/2022 1657  Last data filed at 3/2/2022 0601  Gross per 24 hour   Intake 130 ml   Output 500 ml   Net -370 ml       Physical Exam:   Physical Exam  HENT:      Head: Normocephalic and atraumatic  Mouth/Throat:      Mouth: Mucous membranes are moist    Eyes:      Extraocular Movements: Extraocular movements intact  Cardiovascular:      Rate and Rhythm: Normal rate  Pulmonary:      Effort: Pulmonary effort is normal    Abdominal:      General: Abdomen is flat  Palpations: Abdomen is soft  Tenderness:  There is no abdominal tenderness  Skin:     General: Skin is warm and dry  Neurological:      Mental Status: She is alert  Additional Data:     Labs:  Results from last 7 days   Lab Units 03/01/22  0507 02/28/22  0510 02/28/22  0510   WBC Thousand/uL 8 50   < > 8 63   HEMOGLOBIN g/dL 8 7*   < > 8 5*   HEMATOCRIT % 30 2*   < > 28 5*   PLATELETS Thousands/uL 363   < > 334   NEUTROS PCT %  --   --  64   LYMPHS PCT %  --   --  23   MONOS PCT %  --   --  8   EOS PCT %  --   --  4    < > = values in this interval not displayed  Results from last 7 days   Lab Units 03/01/22  0507 02/25/22  0551 02/24/22  0518   SODIUM mmol/L 141   < > 144   POTASSIUM mmol/L 3 8   < > 3 1*   CHLORIDE mmol/L 103   < > 111*   CO2 mmol/L 31   < > 25   BUN mg/dL 25   < > 12   CREATININE mg/dL 1 18   < > 0 81   ANION GAP mmol/L 7   < > 8   CALCIUM mg/dL 8 4   < > 7 9*   ALBUMIN g/dL  --   --  2 6*   TOTAL BILIRUBIN mg/dL  --   --  0 54   ALK PHOS U/L  --   --  83   ALT U/L  --   --  18   AST U/L  --   --  12   GLUCOSE RANDOM mg/dL 195*   < > 197*    < > = values in this interval not displayed       Results from last 7 days   Lab Units 02/23/22 2038   INR  1 32*     Results from last 7 days   Lab Units 03/02/22  1542 03/02/22  1106 03/02/22  0725 03/01/22  2030 03/01/22  1538 03/01/22  1128 03/01/22  0658 02/28/22  2051 02/28/22  1604 02/28/22  1052 02/28/22  0723 02/27/22  1956   POC GLUCOSE mg/dl 243* 188* 202* 234* 266* 234* 201* 240* 198* 262* 155* 229*         Results from last 7 days   Lab Units 02/25/22  0551 02/24/22  0518 02/23/22  2220 02/23/22 2038   LACTIC ACID mmol/L  --   --  1 6 2 4*   PROCALCITONIN ng/ml 0 13 0 10  --   --        Lines/Drains:  Invasive Devices  Report    Peripheral Intravenous Line            Peripheral IV 02/27/22 Right Forearm 3 days          Airway            Surgical Airway 5 days                  Telemetry:  Telemetry Orders (From admission, onward)             LifeVest Patient: Continuous Telemetry Monitoring during hospitalization (non-expiring)  Continuous LifeVest Telemetry Monitoring        References:    LifeVest Policy                              Imaging: Reviewed radiology reports from this admission including: chest xray    Recent Cultures (last 7 days):   Results from last 7 days   Lab Units 02/24/22 0521 02/24/22 0519 02/23/22 2038   BLOOD CULTURE  No Growth After 5 Days  No Growth After 5 Days  No Growth After 5 Days  No Growth After 5 Days         Last 24 Hours Medication List:   Current Facility-Administered Medications   Medication Dose Route Frequency Provider Last Rate    acetaminophen  975 mg Per G Tube Q6H PRN Titus Navarrobs V, CRNP      albuterol  2 5 mg Nebulization Q4H PRN Rzo Spirocarloso V, CRNP      amiodarone  200 mg Oral Daily With Breakfast Roz Wright V, CRNP      apixaban  5 mg Oral BID Roz Wright V, CRNP      atorvastatin  40 mg Oral Daily Roz Wright V, CRNP      budesonide  0 5 mg Nebulization Q12H Roz Wright V, CRNP      chlorhexidine  15 mL Mouth/Throat Q12H [facility] Roz Wright V, CRNP      famotidine  20 mg Oral BID Roz Wright V, CRNP      furosemide  40 mg Intravenous Daily Carl Lao MD      insulin glargine  10 Units Subcutaneous HS Oli Matamoros DO      insulin lispro  1-6 Units Subcutaneous HS Roz Wright V, NEELAM      insulin lispro  2 Units Subcutaneous TID With Meals Albin Merchant MD      insulin lispro  2-12 Units Subcutaneous TID AC Roz Wright V, CRJAYLIN      ipratropium  0 5 mg Nebulization TID Titus Debbie JOLEEN, CRJAYLIN      levalbuterol  1 25 mg Nebulization TID [place] Debbie GOODRICH, CRNP      LORazepam  0 5 mg Oral BID PRN Roz Spirocarloso V, CRNP      losartan  50 mg Oral Daily Roz Wright V, CRNP      melatonin  6 mg Oral HS Roz Wright V, CRNP      metoprolol tartrate  25 mg Oral Q12H [facility] Roz Wright V, CRNP      ondansetron  4 mg Intravenous Q6H PRN [place]  NEELAM GOODRICH      polyethylene glycol  17 g Oral Daily NEELAM Atkinson      pregabalin  75 mg Oral Daily NEELAM Atkinson      QUEtiapine  25 mg Oral HS NEELAM Atkinson      senna-docusate sodium  1 tablet Oral HS NEELAM Atkinson      sodium chloride  4 mL Nebulization TID Rylee Amos DO      ticagrelor  90 mg Oral Q12H [address] NEELAM Atkinson          Today, Patient Was Seen By: Enrike Londono DO    **Please Note: This note may have been constructed using a voice recognition system  **

## 2022-03-02 NOTE — PLAN OF CARE
Problem: PAIN - ADULT  Goal: Verbalizes/displays adequate comfort level or baseline comfort level  Description: Interventions:  - Encourage patient to monitor pain and request assistance  - Assess pain using appropriate pain scale  - Administer analgesics based on type and severity of pain and evaluate response  - Implement non-pharmacological measures as appropriate and evaluate response  - Consider cultural and social influences on pain and pain management  - Notify physician/advanced practitioner if interventions unsuccessful or patient reports new pain  Outcome: Progressing     Problem: INFECTION - ADULT  Goal: Absence or prevention of progression during hospitalization  Description: INTERVENTIONS:  - Assess and monitor for signs and symptoms of infection  - Monitor lab/diagnostic results  - Monitor all insertion sites, i e  indwelling lines, tubes, and drains  - Monitor endotracheal if appropriate and nasal secretions for changes in amount and color  - [place] appropriate cooling/warming therapies per order  - Administer medications as ordered  - Instruct and encourage patient and family to use good hand hygiene technique  - Identify and instruct in appropriate isolation precautions for identified infection/condition  Outcome: Progressing     Problem: SAFETY ADULT  Goal: Patient will remain free of falls  Description: INTERVENTIONS:  - Educate patient/family on patient safety including physical limitations  - Instruct patient to call for assistance with activity   - Consult OT/PT to assist with strengthening/mobility   - Keep Call bell within reach  - Keep bed low and locked with side rails adjusted as appropriate  - Keep care items and personal belongings within reach  - Initiate and maintain comfort rounds  - Make Fall Risk Sign visible to staff  - Offer Toileting every 2  Hours, in advance of need  - Initiate/Maintain chair/bed alarm  - Obtain necessary fall risk management equipment: walker  - Apply yellow socks and bracelet for high fall risk patients  - Consider moving patient to room near nurses station  Outcome: Progressing  Goal: Maintain or return to baseline ADL function  Description: INTERVENTIONS:  -  Assess patient's ability to carry out ADLs; assess patient's baseline for ADL function and identify physical deficits which impact ability to perform ADLs (bathing, care of mouth/teeth, toileting, grooming, dressing, etc )  - Assess/evaluate cause of self-care deficits   - Assess range of motion  - Assess patient's mobility; develop plan if impaired  - Assess patient's need for assistive devices and provide as appropriate  - Encourage maximum independence but intervene and supervise when necessary  - Involve family in performance of ADLs  - Assess for home care needs following discharge   - Consider OT consult to assist with ADL evaluation and planning for discharge  - Provide patient education as appropriate  Outcome: Progressing  Goal: Maintains/Returns to pre admission functional level  Description: INTERVENTIONS:  - Perform BMAT or MOVE assessment daily    - Set and communicate daily mobility goal to care team and patient/family/caregiver  - Collaborate with rehabilitation services on mobility goals if consulted  - Perform Range of Motion tid  times a day  - Reposition patient every2 hours    - Dangle patient TID  times a day  - Stand patient TID  times a day  - Ambulate patient TID times a day  - Out of bed to chair TID times a day   - Out of bed for meals  TID times a day  - Out of bed for toileting  - Record patient progress and toleration of activity level   Outcome: Progressing     Problem: SAFETY ADULT  Goal: Patient will remain free of falls  Description: INTERVENTIONS:  - Educate patient/family on patient safety including physical limitations  - Instruct patient to call for assistance with activity   - Consult OT/PT to assist with strengthening/mobility   - Keep Call bell within reach  - Keep bed low and locked with side rails adjusted as appropriate  - Keep care items and personal belongings within reach  - Initiate and maintain comfort rounds  - Make Fall Risk Sign visible to staff  - Offer Toileting every 2  Hours, in advance of need  - Initiate/Maintain TID alarm  - Obtain necessary fall risk management equipment: walker  - Apply yellow socks and bracelet for high fall risk patients  - Consider moving patient to room near nurses station  Outcome: Progressing  Goal: Maintain or return to baseline ADL function  Description: INTERVENTIONS:  -  Assess patient's ability to carry out ADLs; assess patient's baseline for ADL function and identify physical deficits which impact ability to perform ADLs (bathing, care of mouth/teeth, toileting, grooming, dressing, etc )  - Assess/evaluate cause of self-care deficits   - Assess range of motion  - Assess patient's mobility; develop plan if impaired  - Assess patient's need for assistive devices and provide as appropriate  - Encourage maximum independence but intervene and supervise when necessary  - Involve family in performance of ADLs  - Assess for home care needs following discharge   - Consider OT consult to assist with ADL evaluation and planning for discharge  - Provide patient education as appropriate  Outcome: Progressing  Goal: Maintains/Returns to pre admission functional level  Description: INTERVENTIONS:  - Perform BMAT or MOVE assessment daily    - Set and communicate daily mobility goal to care team and patient/family/caregiver  - Collaborate with rehabilitation services on mobility goals if consulted  - Perform Range of Motion TID  times a day  - Reposition patient every 2 hours    - Dangle patient TID times a day  - Stand patient TID  times a day  - Ambulate patient TID  times a day  - Out of bed to chair TID  times a day   - Out of bed for meals TID times a day  - Out of bed for toileting  - Record patient progress and toleration of activity level   Outcome: Progressing     Problem: SAFETY ADULT  Goal: Patient will remain free of falls  Description: INTERVENTIONS:  - Educate patient/family on patient safety including physical limitations  - Instruct patient to call for assistance with activity   - Consult OT/PT to assist with strengthening/mobility   - Keep Call bell within reach  - Keep bed low and locked with side rails adjusted as appropriate  - Keep care items and personal belongings within reach  - Initiate and maintain comfort rounds  - Make Fall Risk Sign visible to staff  - Offer Toileting every 2 Hours, in advance of need  - Initiate/Maintain bed alarm  - Obtain necessary fall risk management equipment: walker   - Apply yellow socks and bracelet for high fall risk patients  - Consider moving patient to room near nurses station  Outcome: Progressing  Goal: Maintain or return to baseline ADL function  Description: INTERVENTIONS:  -  Assess patient's ability to carry out ADLs; assess patient's baseline for ADL function and identify physical deficits which impact ability to perform ADLs (bathing, care of mouth/teeth, toileting, grooming, dressing, etc )  - Assess/evaluate cause of self-care deficits   - Assess range of motion  - Assess patient's mobility; develop plan if impaired  - Assess patient's need for assistive devices and provide as appropriate  - Encourage maximum independence but intervene and supervise when necessary  - Involve family in performance of ADLs  - Assess for home care needs following discharge   - Consider OT consult to assist with ADL evaluation and planning for discharge  - Provide patient education as appropriate  Outcome: Progressing  Goal: Maintains/Returns to pre admission functional level  Description: INTERVENTIONS:  - Perform BMAT or MOVE assessment daily    - Set and communicate daily mobility goal to care team and patient/family/caregiver     - Collaborate with rehabilitation services on mobility goals if consulted  - Perform Range of Motion Tid times a day  - Reposition patient every 2 hours    - Dangle patient TID times a day  - Stand patient TID  times a day  - Ambulate patient TID times a day  - Out of bed to chair TID times a day   - Out of bed for meals TID times a day  - Out of bed for toileting  - Record patient progress and toleration of activity level   Outcome: Progressing

## 2022-03-03 VITALS
DIASTOLIC BLOOD PRESSURE: 66 MMHG | SYSTOLIC BLOOD PRESSURE: 116 MMHG | HEART RATE: 83 BPM | TEMPERATURE: 99.3 F | HEIGHT: 70 IN | WEIGHT: 187.83 LBS | OXYGEN SATURATION: 100 % | BODY MASS INDEX: 26.89 KG/M2 | RESPIRATION RATE: 18 BRPM

## 2022-03-03 PROBLEM — I50.43 ACUTE ON CHRONIC COMBINED SYSTOLIC AND DIASTOLIC HEART FAILURE (HCC): Status: RESOLVED | Noted: 2022-02-24 | Resolved: 2022-03-03

## 2022-03-03 PROBLEM — J96.21 ACUTE ON CHRONIC RESPIRATORY FAILURE WITH HYPOXIA (HCC): Status: RESOLVED | Noted: 2021-11-03 | Resolved: 2022-03-03

## 2022-03-03 LAB
GLUCOSE SERPL-MCNC: 193 MG/DL (ref 65–140)
GLUCOSE SERPL-MCNC: 223 MG/DL (ref 65–140)

## 2022-03-03 PROCEDURE — 94640 AIRWAY INHALATION TREATMENT: CPT

## 2022-03-03 PROCEDURE — 99232 SBSQ HOSP IP/OBS MODERATE 35: CPT | Performed by: INTERNAL MEDICINE

## 2022-03-03 PROCEDURE — 99239 HOSP IP/OBS DSCHRG MGMT >30: CPT | Performed by: STUDENT IN AN ORGANIZED HEALTH CARE EDUCATION/TRAINING PROGRAM

## 2022-03-03 PROCEDURE — 94760 N-INVAS EAR/PLS OXIMETRY 1: CPT

## 2022-03-03 PROCEDURE — 82948 REAGENT STRIP/BLOOD GLUCOSE: CPT

## 2022-03-03 RX ORDER — INSULIN GLARGINE 100 [IU]/ML
8 INJECTION, SOLUTION SUBCUTANEOUS DAILY
Qty: 1.12 ML | Refills: 0 | Status: SHIPPED | OUTPATIENT
Start: 2022-03-03 | End: 2022-03-21 | Stop reason: SDUPTHER

## 2022-03-03 RX ORDER — AMIODARONE HYDROCHLORIDE 200 MG/1
200 TABLET ORAL
Qty: 14 TABLET | Refills: 0 | Status: SHIPPED | OUTPATIENT
Start: 2022-03-04 | End: 2022-03-21 | Stop reason: SDUPTHER

## 2022-03-03 RX ORDER — LORAZEPAM 1 MG/1
0.5 TABLET ORAL 2 TIMES DAILY PRN
Qty: 5 TABLET | Refills: 0 | Status: SHIPPED | OUTPATIENT
Start: 2022-03-03 | End: 2022-03-14 | Stop reason: SDUPTHER

## 2022-03-03 RX ORDER — INSULIN GLULISINE 100 [IU]/ML
2 INJECTION, SOLUTION SUBCUTANEOUS
Qty: 0.84 ML | Refills: 0 | Status: SHIPPED | OUTPATIENT
Start: 2022-03-03 | End: 2022-03-21 | Stop reason: SDUPTHER

## 2022-03-03 RX ORDER — ATORVASTATIN CALCIUM 40 MG/1
40 TABLET, FILM COATED ORAL DAILY
Qty: 14 TABLET | Refills: 0 | Status: SHIPPED | OUTPATIENT
Start: 2022-03-03 | End: 2022-03-21 | Stop reason: SDUPTHER

## 2022-03-03 RX ORDER — FUROSEMIDE 20 MG/1
20 TABLET ORAL DAILY
Qty: 14 TABLET | Refills: 0 | Status: SHIPPED | OUTPATIENT
Start: 2022-03-03 | End: 2022-03-11 | Stop reason: SDUPTHER

## 2022-03-03 RX ORDER — LOSARTAN POTASSIUM 50 MG/1
50 TABLET ORAL DAILY
Qty: 14 TABLET | Refills: 0 | Status: SHIPPED | OUTPATIENT
Start: 2022-03-04 | End: 2022-03-21 | Stop reason: SDUPTHER

## 2022-03-03 RX ADMIN — IPRATROPIUM BROMIDE 0.5 MG: 0.5 SOLUTION RESPIRATORY (INHALATION) at 07:28

## 2022-03-03 RX ADMIN — LEVALBUTEROL HYDROCHLORIDE 1.25 MG: 1.25 SOLUTION, CONCENTRATE RESPIRATORY (INHALATION) at 07:27

## 2022-03-03 RX ADMIN — INSULIN LISPRO 4 UNITS: 100 INJECTION, SOLUTION INTRAVENOUS; SUBCUTANEOUS at 12:17

## 2022-03-03 RX ADMIN — TICAGRELOR 90 MG: 90 TABLET ORAL at 10:45

## 2022-03-03 RX ADMIN — METOPROLOL TARTRATE 25 MG: 25 TABLET, FILM COATED ORAL at 10:44

## 2022-03-03 RX ADMIN — INSULIN LISPRO 2 UNITS: 100 INJECTION, SOLUTION INTRAVENOUS; SUBCUTANEOUS at 08:32

## 2022-03-03 RX ADMIN — LOSARTAN POTASSIUM 50 MG: 50 TABLET, FILM COATED ORAL at 10:44

## 2022-03-03 RX ADMIN — SODIUM CHLORIDE SOLN NEBU 3% 4 ML: 3 NEBU SOLN at 13:35

## 2022-03-03 RX ADMIN — PREGABALIN 75 MG: 75 CAPSULE ORAL at 10:43

## 2022-03-03 RX ADMIN — INSULIN LISPRO 2 UNITS: 100 INJECTION, SOLUTION INTRAVENOUS; SUBCUTANEOUS at 12:18

## 2022-03-03 RX ADMIN — CHLORHEXIDINE GLUCONATE 0.12% ORAL RINSE 15 ML: 1.2 LIQUID ORAL at 10:43

## 2022-03-03 RX ADMIN — AMIODARONE HYDROCHLORIDE 200 MG: 200 TABLET ORAL at 08:32

## 2022-03-03 RX ADMIN — BUDESONIDE 0.5 MG: 0.5 INHALANT ORAL at 07:27

## 2022-03-03 RX ADMIN — ATORVASTATIN CALCIUM 40 MG: 40 TABLET, FILM COATED ORAL at 10:44

## 2022-03-03 RX ADMIN — FAMOTIDINE 20 MG: 40 POWDER, FOR SUSPENSION ORAL at 10:54

## 2022-03-03 RX ADMIN — LORAZEPAM 0.5 MG: 0.5 TABLET ORAL at 16:51

## 2022-03-03 RX ADMIN — APIXABAN 5 MG: 5 TABLET, FILM COATED ORAL at 10:44

## 2022-03-03 RX ADMIN — SODIUM CHLORIDE SOLN NEBU 3% 4 ML: 3 NEBU SOLN at 07:28

## 2022-03-03 RX ADMIN — LEVALBUTEROL HYDROCHLORIDE 1.25 MG: 1.25 SOLUTION, CONCENTRATE RESPIRATORY (INHALATION) at 13:33

## 2022-03-03 RX ADMIN — IPRATROPIUM BROMIDE 0.5 MG: 0.5 SOLUTION RESPIRATORY (INHALATION) at 13:33

## 2022-03-03 RX ADMIN — FUROSEMIDE 40 MG: 10 INJECTION, SOLUTION INTRAMUSCULAR; INTRAVENOUS at 10:43

## 2022-03-03 NOTE — DISCHARGE INSTRUCTIONS
A-fib (Atrial Fibrillation)   WHAT YOU NEED TO KNOW:   A-fib may come and go, or it may be a long-term condition  A-fib can cause blood clots, stroke, or heart failure  These conditions may become life-threatening  It is important to treat and manage A-fib to help prevent a blood clot, stroke, or heart failure  DISCHARGE INSTRUCTIONS:   Call your local emergency number (911 in the [address] [facility]) or have someone call if:   · You have any of the following signs of a heart attack:      ? Squeezing, pressure, or pain in your chest    ? You may  also have any of the following:     § Discomfort or pain in your back, neck, jaw, stomach, or arm    § Shortness of breath    § Nausea or vomiting    § Lightheadedness or a sudden cold sweat    · You have any of the following signs of a stroke:      ? Numbness or drooping on one side of your face     ? Weakness in an arm or leg    ? Confusion or difficulty speaking    ? Dizziness, a severe headache, or vision loss    Call your doctor or cardiologist if:   · Your arm or leg feels warm, tender, and painful  It may look swollen and red  · Your heart rate is more than 110 beats per minute  · You have new or worsening swelling in your legs, feet, ankles, or abdomen  · You are short of breath, even at rest     · You have questions or concerns about your condition or care  Medicines: You may need any of the following:  · Heart medicines  help control your heart rate or rhythm  You may need more than one medicine to treat your symptoms  · Blood thinners  help prevent blood clots  Clots can cause strokes, heart attacks, and death  The following are general safety guidelines to follow while you are taking a blood thinner:    ? Watch for bleeding and bruising while you take blood thinners  Watch for bleeding from your gums or nose  Watch for blood in your urine and bowel movements  Use a soft washcloth on your skin, and a soft toothbrush to brush your teeth   This can keep your skin and gums from bleeding  If you shave, use an electric shaver  Do not play contact sports  ? Tell your dentist and other healthcare providers that you take a blood thinner  Wear a bracelet or necklace that says you take this medicine  ? Do not start or stop any other medicines unless your healthcare provider tells you to  Many medicines cannot be used with blood thinners  ? Take your blood thinner exactly as prescribed by your healthcare provider  Do not skip does or take less than prescribed  Tell your provider right away if you forget to take your blood thinner, or if you take too much  ? Warfarin  is a blood thinner that you may need to take  The following are things you should be aware of if you take warfarin:     § Foods and medicines can affect the amount of warfarin in your blood  Do not make major changes to your diet while you take warfarin  Warfarin works best when you eat about the same amount of vitamin K every day  Vitamin K is found in green leafy vegetables and certain other foods  Ask for more information about what to eat when you are taking warfarin  § You will need to see your healthcare provider for follow-up visits when you are on warfarin  You will need regular blood tests  These tests are used to decide how much medicine you need  · Antiplatelets , such as aspirin, help prevent blood clots  Take your antiplatelet medicine exactly as directed  These medicines make it more likely for you to bleed or bruise  If you are told to take aspirin, do not take acetaminophen or ibuprofen instead  · Take your medicine as directed  Contact your healthcare provider if you think your medicine is not helping or if you have side effects  Tell him or her if you are allergic to any medicine  Keep a list of the medicines, vitamins, and herbs you take  Include the amounts, and when and why you take them  Bring the list or the pill bottles to follow-up visits   Carry your medicine list with you in case of an emergency  Manage A-fib:   · Know your target heart rate  Learn how to check your pulse and monitor your heart rate  · Know the risks if you choose to drink alcohol  Alcohol can increase your risk for A-fib or make A-fib harder to manage  Ask your healthcare provider if it is okay for you to drink any alcohol  He or she can help you set limits for the number of drinks you have in 24 hours and in a week  A drink of alcohol is 12 ounces of beer, 5 ounces of wine, or 1½ ounces of liquor  · Do not smoke  Nicotine can cause heart damage and make it more difficult to manage your A-fib  Do not use e-cigarettes or smokeless tobacco in place of cigarettes or to help you quit  They still contain nicotine  Ask your healthcare provider for information if you currently smoke and need help quitting  · Eat heart-healthy foods  Heart healthy foods will help keep your cholesterol low  These include fruits, vegetables, whole-grain breads, low-fat dairy products, beans, lean meats, and fish  Replace butter and margarine with heart-healthy oils such as olive oil and canola oil  · Maintain a healthy weight  Ask your healthcare provider what a healthy weight is for you  Ask him or her to help you create a safe weight loss plan if you are overweight  Even a small goal of a 10% weight loss can improve your heart health  · Get regular physical activity  Physical activity helps improve your heart health  Get at least 150 minutes of moderate aerobic physical activity each week  Your healthcare provider can help you create an activity plan  · Manage other health conditions  This includes high blood pressure or cholesterol, sleep apnea, diabetes, and other heart conditions  Take medicine as directed and follow your treatment plan  Your healthcare provider may need to change a medicine you are taking if it is causing your A-fib   Do not  stop taking any medicine unless directed by your provider  Follow up with your doctor or cardiologist as directed: You will need regular blood tests and monitoring  Write down your questions so you remember to ask them during your visits  © Copyright [employer] 2022 Information is for End User's use only and may not be sold, redistributed or otherwise used for commercial purposes  All illustrations and images included in CareNotes® are the copyrighted property of A D A M , Inc  or [facility]   The above information is an  only  It is not intended as medical advice for individual conditions or treatments  Talk to your doctor, nurse or pharmacist before following any medical regimen to see if it is safe and effective for you

## 2022-03-03 NOTE — CASE MANAGEMENT
Case Management Discharge Planning Note    Patient name Noble Aschoff  Location [address] [place] [address]/4 [address] MRN [id number]  : 1965 Date 3/3/2022       Current Admission Date: 2022  Current Admission Diagnosis:CAD (coronary artery disease)   Patient Active Problem List    Diagnosis Date Noted    Subglottic stenosis 2022    CAD (coronary artery disease) 2021    Urinary retention 2021    Cerebral aneurysm 2021    [facility] vascular accident ([facility] ) 2021    History of Cardiac arrest ([facility] ) 2021    A-fib ([place] [address] ) 10/30/2021    History of Ventricular tachycardia ([place] [address] ) 10/27/2021    Cellulitis of left lower extremity 2021    Hypoglycemia 2021    Polypharmacy 2021    Trigger finger, right middle finger 2021    Trigger finger, right ring finger 2021    Diarrhea 2021    Nasal vestibulitis 2021    Orthopnea 2021    CHANTALE (obstructive sleep apnea) 2021    Palpitations 2020    Abscess 10/11/2020    Bacterial vaginosis 2020    Urinary tract infection with hematuria 2020    Epigastric pain 2019    Thoracic aortic aneurysm without rupture ([facility] ) 2018    Abnormal urinalysis 2018    Vaginal discharge 2018    Yeast vaginitis 2018    Recurrent vaginitis 2018    Cardiac murmur 12/15/2017    Primary insomnia 12/15/2017    Anxiety 2017    Depression, recurrent ([facility] ) 2017    Retinal hemorrhage due to secondary diabetes ([facility] ) 2017    Fibromyalgia 2017    Hypertension 2017    Hypoestrogenism 2017    Neuropathy 2017    Chronic pain disorder 2017    Medically complex patient 2017    Change in bowel habit 2017    Screening for colon cancer 2016    Cough 02/15/2016    Non compliance with medical treatment 2016    Dizziness 2016    Gastroesophageal reflux disease with esophagitis 01/26/2016    Vertigo 01/26/2016    Vertebral body hemangioma 08/21/2015    Hemangioma of bone 07/30/2015    Right-sided thoracic back pain 07/23/2015    Thoracic radiculitis 07/23/2015    Carpal tunnel syndrome of left wrist 06/26/2015    Tobacco abuse 06/26/2015    Uncontrolled type 2 diabetes mellitus with complication, with long-term current use of insulin ([facility] ) 06/09/2015    Hyperlipidemia associated with type 2 diabetes mellitus ([facility] ) 05/06/2015    Noncompliance with diet and medication regimen 05/06/2015    Shingles 03/25/2015    Diabetic peripheral neuropathy ([facility] ) 02/25/2015    Low back pain 02/25/2015    Lumbar radiculopathy 02/25/2015    Overweight 02/25/2015    Sciatica of left side 02/25/2015      LOS (days): 7  Geometric Mean LOS (GMLOS) (days): 3 80  Days to GMLOS:-3 8     OBJECTIVE:  Risk of Unplanned Readmission Score: 35   Current admission status: Inpatient   Preferred Pharmacy:   [address] [facility], [address] [address] [address]  Phone: [phone] Fax: [phone] [place] [address], [address] [place] [address] [address]  [address]  [facility] [address]  Phone: [phone] Fax: [phone]    [facility], [facility] [address] [address]  Phone: [phone] Fax: [address], [address] [facility] 94 [facility] 38 210 [facility]  Phone: [phone] Fax: [phone]    Primary Care Provider: Rashid Shirley MD    Primary Insurance: MEDICARE  Secondary Insurance: [facility]    DISCHARGE DETAILS:    CM spoke with patients braydon Gant and confirmed dc plan to return home with [facility]  He states he works until 4 and will be out to pick patient after then  Patients nurse Destinee Bishop was made aware      CM called [facility] and spoke with Ashley Landa and made her aware patient is discharged today  Confirmed they will resume care with her at [place]  AVS and progress note faxed to her at [phone] as requested

## 2022-03-03 NOTE — DISCHARGE SUMMARY
Call[address]  Discharge- [place] 1965, 55 y o  female MRN: [id number]  Unit/Bed#: [address] [place] [address] Encounter: [id number]  Primary Care Provider: Francois Valderrama MD   Date and time admitted to hospital: 2/24/2022 12:26 AM    No new Assessment & Plan notes have been filed under this hospital service since the last note was generated  Service: Hospitalist      Medical Problems             Resolved Problems  Date Reviewed: 2/26/2022          Resolved    Uncontrolled hypertension 2/24/2022     Resolved by  NEELAM Oneil    Overview Signed 4/12/2018 10:32 AM by NEELAM Ryan     Last Assessment & Plan:   BP today above goal <140/90, apparently asymptomatic  Prior BP elevations were attributed to not taking medication  Consider increased medication if persistent on f/u  * (Principal) Acute on chronic respiratory failure with hypoxia ([address] ) 3/3/2022     Resolved by  Aiden Childress DO    Acute on chronic combined systolic and diastolic heart failure ([address] ) 3/3/2022     Resolved by  Aiden Childress DO              Discharging Physician / Practitioner: Aiden Childress DO  PCP: Francois Valderrama MD  Admission Date:   Admission Orders (From admission, onward)     Ordered        02/24/22 0039  Inpatient Admission  Once                      Discharge Date: 03/03/22    Consultations During Hospital Stay:  · Pulmonology, Cardiology, ICU    Procedures Performed:   · Tracheostomy replacement     Significant Findings / Test Results:   CT: 1  No evidence of pulmonary embolism  2   Mild diffuse ground glass opacities in the lungs may be due to pulmonary edema  Small bilateral pleural effusions  Cardiomegaly  Correlate for heart failure  3   Patchy opacities at the bilateral lower lobe bases and within the posterior left upper lobe which may be due to atelectasis and/or pneumonia in the appropriate clinical setting      Reason for Admission: Respiratory failure     Hospital Course: Toño Gallegos is a 55 y o  female patient who originally presented to the hospital on 2/24/2022 due to shortness of breath  Patient had initially presented to [facility] and found to be in acute respiratory failure due to CHF  Patient was placed on ventilatory support and diuresed with Lasix  Patient was transferred to [facility] ICU  Cardiology and pulmonology were consulted and recommendations were appreciated  Patient improved with diuresis exchange of tracheostomy  Patient was instructed to follow up with EP outpatient  Please see above list of diagnoses and related plan for additional information  Condition at Discharge: fair    Discharge Day Visit / Exam:   Subjective:  No acute events overnight  Vitals: Blood Pressure: 116/66 (03/03/22 0823)  Pulse: 83 (03/03/22 0823)  Temperature: 99 3 °F (37 4 °C) (03/03/22 0823)  Temp Source: Oral (03/03/22 0823)  Respirations: 18 (03/03/22 0823)  Height: 5' 10" (177 8 cm) (02/24/22 4820)  Weight - Scale: 85 2 kg (187 lb 13 3 oz) (03/03/22 0600)  SpO2: 100 % (03/03/22 1333)  Exam:   Physical Exam  HENT:      Head: Normocephalic and atraumatic  Mouth/Throat:      Mouth: Mucous membranes are moist    Eyes:      Extraocular Movements: Extraocular movements intact  Cardiovascular:      Rate and Rhythm: Normal rate  Pulmonary:      Effort: Pulmonary effort is normal       Breath sounds: Normal breath sounds  Abdominal:      General: Abdomen is flat  Palpations: Abdomen is soft  Tenderness: There is no abdominal tenderness  Skin:     General: Skin is warm and dry  Neurological:      Mental Status: She is alert  Discharge instructions/Information to patient and family:   See after visit summary for information provided to patient and family  Provisions for Follow-Up Care:  See after visit summary for information related to follow-up care and any pertinent home health orders         Disposition:   Home with VNA Services (Reminder: Complete face to face encounter)    Planned Readmission: none     Discharge Statement:  I spent greater than 30 minutes discharging the patient  This time was spent on the day of discharge  I had direct contact with the patient on the day of discharge  Greater than 50% of the total time was spent examining patient, answering all patient questions, arranging and discussing plan of care with patient as well as directly providing post-discharge instructions  Additional time then spent on discharge activities  Discharge Medications:  See after visit summary for reconciled discharge medications provided to patient and/or family        **Please Note: This note may have been constructed using a voice recognition system**

## 2022-03-03 NOTE — RESPIRATORY THERAPY NOTE
Placed pt on 28% venti/trach collar for transport home, educated pt son on how to use it he expressed understanding

## 2022-03-03 NOTE — NURSING NOTE
AVS printed and reviewed with the patient at the bedside  Opportunity for questions provided  Telemetry remained in place until pt ready to leave, the patient is discharged with her life vest, refused to wear this at time of discharge  [facility] collar tubing provided at discharge, oxygen delivery set up by RT at discharge  The patient is discharged to home in no distress to be followed by home health and cardiac rehab

## 2022-03-03 NOTE — [place] UNIVERSAL TRANSFER FORM
[place] UNIVERSAL TRANSFER FORM  (ALL ITEMS MUST BE COMPLETED)    1  TRANSFER FROM: [address] [place] [address] with home health    2  DATE OF TRANSFER: 3/3/2022                        TIME OF TRANSFER: 1630    3  PATIENT NAME: CE Mendez      YOB: 1965                             GENDER: female    4  LANGUAGE:   English    5  PHYSICIAN NAME:  Cuateemiliano BryDO mario alberto                   PHONE: [phone]  CODE STATUS: Level 1 - Full Code        Out of Hospital DNR Attached: No    7  :                                      :  Extended Emergency Contact Information  Primary Emergency Contact: Matt Noguera  [place] [employer] [place] Phone: [phone]  Relation: Son   needed? No  Secondary Emergency Contact: Ash Serrato  [employer] Phone: [phone]  Relation: Sister   needed? No           Health Care Representative/Proxy:  No           Legal Guardian:  No             NAME OF:           HEALTH CARE REPRESENTATIVE/PROXY:                                         OR           LEGAL GUARDIAN, IF NOT :                                               PHONE:  (Day)           (Night)                        (Cell)    8  REASON FOR TRANSFER: (Must include brief medical history and recent changes in physical function or cognition ) stable for discharge to home with VNA rehab            V/S: /66 (BP Location: Right arm)   Pulse 83   Temp 99 3 °F (37 4 °C) (Oral)   Resp 18   Ht 5' 10" (1 778 m)   Wt 85 2 kg (187 lb 13 3 oz)   SpO2 100%   BMI 26 95 kg/m²           PAIN: None    9  PRIMARY DIAGNOSIS: Acute on chronic respiratory failure with hypoxia (HCC)      Secondary Diagnosis:         Pacemaker: No      Internal Defib: No          Mental Health Diagnosis (if Applicable):    10  RESTRAINTS: No     11  RESPIRATORY NEEDS: Oxygen Device trach collar, and Trach    12  ISOLATION/PRECAUTION: None    13  ALLERGY: Metformin and Clonidine    14  SENSORY:       Vision Good, Hearing Good  and Speech Difficult    15  SKIN CONDITION: No Wounds    16  DIET: Special (describe)diabetic    17  IV ACCESS: None    18  PERSONAL ITEMS SENT WITH PATIENT: [employer]the[employer], clothing, life vest, cell phone,  cords    19  ATTACHED DOCUMENTS: MUST ATTACH CURRENT MEDICATION INFORMATION Face Sheet    20  AT RISK ALERTS:Falls        HARM TO: N/A    21  WEIGHT BEARING STATUS:         Left Leg: Limited        Right Leg: Limited    22  MENTAL STATUS:Alert and Oriented    23  FUNCTION:        Walk: With Help        Transfer: With Help        Toilet: Self        Feed: Self    24  IMMUNIZATIONS/SCREENING:     Immunization History   Administered Date(s) Administered    INFLUENZA 09/16/2011    Pneumococcal Polysaccharide PPV23 12/15/2017    Tdap 12/15/2017, 02/15/2020       25  BOWEL: Continent    26  BLADDER: Continent    27   SENDING FACILITY CONTACT: Zoran Gallegos                  Title: RN        Unit:4N        Phone: [phone] [address] (if known):        Title:        Unit:         Phone:         FORM PREFILLED BY (if applicable)       Title:       Unit:        Phone:         FORM COMPLETED BY Jared Hope RN      Title:       Phone:

## 2022-03-03 NOTE — CASE MANAGEMENT
Case Management Discharge Planning Note    Patient name Damaris Vazquez  Location [address] [place] [address]/[address] MRN [id number]  : 1965 Date 3/3/2022       Current Admission Date: 2022  Current Admission Diagnosis:Acute on chronic respiratory failure with hypoxia [facility])   Patient Active Problem List    Diagnosis Date Noted    Subglottic stenosis 2022    Acute on chronic combined systolic and diastolic heart failure ([facility] ) 2022    CAD (coronary artery disease) 2021    Urinary retention 2021    Cerebral aneurysm 2021    Cerebral vascular accident ([facility] ) 2021    Acute on chronic respiratory failure with hypoxia ([facility] ) 2021    [facility] of [facility] 2021    A-fib ([facility] ) 10/30/2021    History of Ventricular tachycardia ([facility] ) 10/27/2021    Cellulitis of left lower extremity 2021    Hypoglycemia 2021    Polypharmacy 2021    Trigger finger, right middle finger 2021    Trigger finger, right ring finger 2021    Diarrhea 2021    Nasal vestibulitis 2021    Orthopnea 2021    CHANTALE (obstructive sleep apnea) 2021    Palpitations 2020    Abscess 10/11/2020    Bacterial vaginosis 2020    Urinary tract infection with hematuria 2020    Epigastric pain 2019    Thoracic aortic aneurysm without rupture ([facility] ) 2018    Abnormal urinalysis 2018    Vaginal discharge 2018    Yeast vaginitis 2018    Recurrent vaginitis 2018    Cardiac murmur 12/15/2017    Primary insomnia 12/15/2017    Anxiety 2017    Depression, recurrent ([facility] ) 2017    Retinal hemorrhage due to secondary diabetes [facility] ) 2017    Fibromyalgia 2017    Hypertension 2017    Hypoestrogenism 2017    Neuropathy 2017    Chronic pain disorder 2017    Medically complex patient 2017    Change in bowel habit 2017    Screening for colon cancer 12/05/2016    Cough 02/15/2016    Non compliance with medical treatment 01/27/2016    Dizziness 01/26/2016    Gastroesophageal reflux disease with esophagitis 01/26/2016    Vertigo 01/26/2016    Vertebral body hemangioma 08/21/2015    Hemangioma of bone 07/30/2015    Right-sided thoracic back pain 07/23/2015    Thoracic radiculitis 07/23/2015    Carpal tunnel syndrome of left wrist 06/26/2015    Tobacco abuse 06/26/2015    Uncontrolled type 2 diabetes mellitus with complication, with long-term current use of insulin ([facility] ) 06/09/2015    Hyperlipidemia associated with type 2 diabetes mellitus ([facility] ) 05/06/2015    Noncompliance with diet and medication regimen 05/06/2015    Shingles 03/25/2015    Diabetic peripheral neuropathy ([facility] ) 02/25/2015    Low back pain 02/25/2015    Lumbar radiculopathy 02/25/2015    Overweight 02/25/2015    Sciatica of left side 02/25/2015      LOS (days): 7  Geometric Mean LOS (GMLOS) (days): 3 80  Days to GMLOS:-3 5     OBJECTIVE:  Risk of Unplanned Readmission Score: 35   Current admission status: Inpatient   Preferred Pharmacy:   [facility], [address] [address] [address]  Phone: [phone] Fax: [facility] [address] [place] [address] 2045 [facility]  2045 2001 [facility] [address]  Phone: [phone] Fax: [phone]    [facility] #[id number] - [address], [facility] [address] [place] [address]  Phone: [phone] Fax: [address]  [facility], [address] [facility] 94 [facility] 38 210 [facility]  Phone: [phone] Fax: [phone]    Primary Care Provider: Erika Maynard MD    Primary Insurance: MEDICARE  Secondary Insurance: [facility]    DISCHARGE DETAILS:    [facility] and they do not have her under services and cannot take her case  CM messaged [facility] to make them aware and requested services with them  CM to follow

## 2022-03-03 NOTE — CASE MANAGEMENT
Case Management Discharge Planning Note    Patient name Amy Aponte  Location [address] [place] [address] 407/4 [address]-* MRN [id number]  : 1965 Date 3/3/2022       Current Admission Date: 2022  Current Admission Diagnosis:Acute on chronic respiratory failure with hypoxia [facility])   Patient Active Problem List    Diagnosis Date Noted    Subglottic stenosis 2022    Acute on chronic combined systolic and diastolic heart failure ([facility] ) 2022    CAD (coronary artery disease) 2021    Urinary retention 2021    Cerebral aneurysm 2021    Cerebral vascular accident ([facility] ) 2021    Acute on chronic respiratory failure with hypoxia ([facility] ) 2021    History of Cardiac arrest ([facility] ) 2021    A-fib ([facility] ) 10/30/2021    History of Ventricular tachycardia ([facility] ) 10/27/2021    Cellulitis of left lower extremity 2021    Hypoglycemia 2021    Polypharmacy 2021    Trigger finger, right middle finger 2021    Trigger finger, right ring finger 2021    Diarrhea 2021    Nasal vestibulitis 2021    Orthopnea 2021    CHANTALE (obstructive sleep apnea) 2021    Palpitations 2020    Abscess 10/11/2020    Bacterial vaginosis 2020    Urinary tract infection with hematuria 2020    Epigastric pain 2019    Thoracic aortic aneurysm without rupture ([facility] ) 2018    Abnormal urinalysis 2018    Vaginal discharge 2018    Yeast vaginitis 2018    Recurrent vaginitis 2018    Cardiac murmur 12/15/2017    Primary insomnia 12/15/2017    Anxiety 2017    Depression, recurrent ([facility] ) 2017    Retinal hemorrhage due to secondary diabetes ([facility] ) 2017    Fibromyalgia 2017    Hypertension 2017    Hypoestrogenism 2017    Neuropathy 2017    Chronic pain disorder 2017    Medically complex patient 2017    Change in bowel habit 2017    Screening for colon cancer 12/05/2016    Cough 02/15/2016    Non compliance with medical treatment 01/27/2016    Dizziness 01/26/2016    Gastroesophageal reflux disease with esophagitis 01/26/2016    Vertigo 01/26/2016    Vertebral body hemangioma 08/21/2015    Hemangioma of bone 07/30/2015    Right-sided thoracic back pain 07/23/2015    Thoracic radiculitis 07/23/2015    Carpal tunnel syndrome of left wrist 06/26/2015    Tobacco abuse 06/26/2015    Uncontrolled type 2 diabetes mellitus with complication, with long-term current use of insulin ([facility] ) 06/09/2015    Hyperlipidemia associated with type 2 diabetes mellitus ([facility] ) 05/06/2015    Noncompliance with diet and medication regimen 05/06/2015    Shingles 03/25/2015    Diabetic peripheral neuropathy ([facility] ) 02/25/2015    Low back pain 02/25/2015    Lumbar radiculopathy 02/25/2015    Overweight 02/25/2015    Sciatica of left side 02/25/2015      LOS (days): 7  Geometric Mean LOS (GMLOS) (days): 3 80  Days to GMLOS:-3 6     OBJECTIVE:  Risk of Unplanned Readmission Score: 35         Current admission status: Inpatient   Preferred Pharmacy:   [facility], [address] [address] [address]  Phone: [phone] Fax: [facility] [address] [place] [address] [address] [place] [address]  [address] [address]  Phone: [phone] Fax: [phone]    [place] [id number] [id number] [address] [facility] [address] [place] [address]  Phone: [phone] Fax: [address] [facility] [place], [address] [facility]  Phone: [phone] Fax: [phone]    Primary Care Provider: Vinod Vogt MD    Primary Insurance: MEDICARE  Secondary Insurance: [facility]    DISCHARGE DETAILS:    CM received a call from a Federica requesting a call back regarding concerns for patients dc  She is not on patients contact list  DEREK called her back and there was no answer  DEREK then called patients son Mohsen Enamorado to make him aware of this call and check if he had any concerns  There was no answer and there was not an option to leave a message  DEREK then called patients sister Sukhjinder Tanner and made her aware of this phone call and if she knew who this was  She states that Alayna Dunn is Duane's girl friend  DEREK made her aware that information cannot be provided to her because she is not a contact  She states understanding and call her and make her aware and find out what she needed with cm  CM made her aware that all communication will be going though patients son and her  They can update family and friends if they choose  Cm to follow

## 2022-03-03 NOTE — PROGRESS NOTES
Progress Note - Pulmonary   Edvin Aimee 55 y o  female MRN: [id number]  Unit/Bed#: [address] [place] [address] Encounter: [id number]  Code Status: Level 1 - Full Code        Assessment/Plan:     Cynthia Zhao is a 55 y o  Past Medical History:   Diagnosis Date    Anxiety     Aortic aneurysm ([address] )     Arthritis     Depression     Diabetes mellitus (HCC)     Fibromyalgia     GERD (gastroesophageal reflux disease)     GERD (gastroesophageal reflux disease)     H/O cardiovascular stress test 09/2018    no ischemia  EF 70%   H/O echocardiogram 01/2019    EF 60%  Mild LVH  trivial effusion   Hyperlipidemia     Hypertension     Migraines     Psychiatric disorder     anxiety    Uncontrolled hypertension 2/25/2015    Last Assessment & Plan:  BP today above goal <140/90, apparently asymptomatic  Prior BP elevations were attributed to not taking medication  Consider increased medication if persistent on f/u       64-year-old female 11/2021 had tracheostomy with PEG insertion 11/2021/ 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, 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  Did have 6 uncuffed Shiley tracheostomy on 01/13/22  Did prior have trach removed but w/ recurrent stridor / stenosis  Acute on chronic hypoxemic respiratory failure  -currently on 35% trach mask 8 L  Status post tracheostomy January 2022  -transitioned yesterday to 6 shiley non cuffed fenestrated trach  -now phonates better with fenestrated trach  Heavy smoking history of possible COPD  -nebulizer therapy per tracheostomy  -Xopenex/Atrovent    Patient is stable from a Pulmonary standpoint  Pulmonary service will sign off of this patient's case  Please call with questions, concerns, or need for reevaluation  Patient to otherwise follow up with ENT in outpatient setting for further tracheostomy management  _________________________________________________    Subjective / 24 hour events:     Trace bleeding post trach change  Stable  Pertinent labs Reviewed    Pertinent imaging Reviewed  Collaborative Discussion:  Case discussed with internal medicine team as well as Pulmonary attending for plan for s/o of patient care  Tele Events:     Vitals:   Vitals:    22 0241 22 0600 22 0728 22 0822   BP: 125/76   116/66   Pulse: 65   79   Resp: 18      Temp: 98 2 °F (36 8 °C)   99 3 °F (37 4 °C)   TempSrc:       SpO2: 97%  97% 99%   Weight:  85 2 kg (187 lb 13 3 oz)     Height:         Weight (last 2 days)     Date/Time Weight    22 06 85 2 (187 83)    22 0600 85 (187 39)        Temp (24hrs), Av °F (36 7 °C), Min:96 3 °F (35 7 °C), Max:99 3 °F (37 4 °C)  Current: Temperature: 99 3 °F (37 4 °C)          IV Infusions:       Nutrition:        Diet Orders   (From admission, onward)             Start     Ordered    22 0842  Dietary nutrition supplements  Once        Question Answer Comment   Select Supplement: Glucerna-Chocolate    Frequency Breakfast, Lunch, Dinner        22 0644    22 0349  Diet Cardiovascular; Sodium 2 GM; Consistent Carbohydrate Diet Level 2 (5 carb servings/75 grams CHO/meal)  Diet effective now        References:    Nutrtion Support Algorithm Enteral vs  Parenteral   Question Answer Comment   Diet Type Cardiovascular    Cardiac Sodium 2 GM    Other Restriction(s): Consistent Carbohydrate Diet Level 2 (5 carb servings/75 grams CHO/meal)    RD to adjust diet per protocol? Yes        22 0348    22 1100  Room Service  Once        Question:  Type of Service  Answer:  Room Service-Appropriate    22 1059                Ins/Outs:   I/O        0701  03/ 0700 03/ 0701   0700 / 0701  03 0700    P  O   120     I V  (mL/kg)  10 (0 1)     Total Intake(mL/kg)  130 (1 5)     Urine (mL/kg/hr) 400 (0 2) 1100 (0 5)     Total Output 400 1100     Net -400 -970                  Lines/Drains:  Invasive Devices  Report    Peripheral Intravenous Line            Peripheral IV 22 Right Forearm 4 days Airway            Surgical Airway 6 days                 Active medications:  Scheduled Meds:  Current Facility-Administered Medications   Medication Dose Route Frequency Provider Last Rate    acetaminophen  975 mg Per G Tube Q6H PRN Christy Julieth V, CRNP      albuterol  2 5 mg Nebulization Q4H PRN Roz Simmonso V, CRNP      amiodarone  200 mg Oral Daily With Breakfast Roz Simmonso V, CRNP      apixaban  5 mg Oral BID Roz Spirocarloso V, CRNP      atorvastatin  40 mg Oral Daily Rozaristides Simmonso V, CRNP      budesonide  0 5 mg Nebulization Q12H Roz Simmonso V, CRNP      chlorhexidine  15 mL Mouth/Throat Q12H [address] Roz Spirocarloso V, CRNP      famotidine  20 mg Oral BID Roz Simmonso V, CRNP      furosemide  40 mg Intravenous Daily Leopoldo Boas, MD      insulin glargine  10 Units Subcutaneous HS Oli Matamoros DO      insulin lispro  1-6 Units Subcutaneous HS Roz Simmonso JOLEEN, CRNP      insulin lispro  2 Units Subcutaneous TID With Meals Perez Galvan MD      insulin lispro  2-12 Units Subcutaneous TID AC Roz Simmonso V, CRNP      ipratropium  0 5 mg Nebulization TID Christy Clancy V, CRNP      levalbuterol  1 25 mg Nebulization TID Christy Clancy V, CRNP      LORazepam  0 5 mg Oral BID PRN Roz Simmonso V, CRNP      losartan  50 mg Oral Daily Roz Simmonso V, CRNP      melatonin  6 mg Oral HS Roz Simmonso V, CRNP      metoprolol tartrate  25 mg Oral Q12H [address] Roz Spironello V, CRNP      ondansetron  4 mg Intravenous Q6H PRN Roz Spironello V, CRNP      polyethylene glycol  17 g Oral Daily Roz Spirocarloso V, CRNP      pregabalin  75 mg Oral Daily Roz Simmonso V, CRNP      QUEtiapine  25 mg Oral HS Roz Spironello V, CRNP      senna-docusate sodium  1 tablet Oral HS Roz Spironello V, CRNP      sodium chloride  4 mL Nebulization TID Rylee Amos DO      ticagrelor  90 mg Oral Q12H [address] Roz Spironello V, CRNP       PRN Meds:acetaminophen, 975 mg, Q6H PRN  albuterol, 2 5 mg, Q4H PRN  LORazepam, 0 5 mg, BID PRN  ondansetron, 4 mg, Q6H PRN      ____________________________________________________________________    Physical Exam  Constitutional:       Appearance: She is well-developed  HENT:      Head: Normocephalic and atraumatic  Eyes:      Conjunctiva/sclera: Conjunctivae normal       Pupils: Pupils are equal, round, and reactive to light  Cardiovascular:      Rate and Rhythm: Normal rate and regular rhythm  Heart sounds: Normal heart sounds  Pulmonary:      Effort: Pulmonary effort is normal  No respiratory distress  Breath sounds: Normal breath sounds  No wheezing or rales  Comments: Is now able to phonate    Room air  Chest:      Chest wall: No tenderness  Abdominal:      General: Bowel sounds are normal       Palpations: Abdomen is soft  Musculoskeletal:         General: Normal range of motion  Cervical back: Normal range of motion and neck supple  Skin:     General: Skin is warm and dry  Neurological:      Mental Status: She is alert and oriented to person, place, and time         ____________________________________________________________________    Invasive/non-invasive ventilation settings   Respiratory  Report   Lab Data (Last 4 hours)    None         O2/Vent Data (Last 4 hours)    None                Laboratory and Diagnostics:  Results from last 7 days   Lab Units 03/01/22  0507 02/28/22  0510 02/27/22  0545 02/26/22  0737 02/25/22  0551   WBC Thousand/uL 8 50 8 63 8 23 8 09 8 09   HEMOGLOBIN g/dL 8 7* 8 5* 8 0* 8 0* 7 9*   HEMATOCRIT % 30 2* 28 5* 26 7* 27 4* 27 0*   PLATELETS Thousands/uL 363 334 330 305 287   NEUTROS PCT %  --  64  --   --   --    MONOS PCT %  --  8  --   --   --      Results from last 7 days   Lab Units 03/01/22  0507 02/28/22  0510 02/27/22  0545 02/26/22  0737 02/25/22  0551   SODIUM mmol/L 141 142 143 143 145   POTASSIUM mmol/L 3 8 4 1 4 5 3 7 3 9   CHLORIDE mmol/L 103 106 107 105 110*   CO2 mmol/L 31 28 28 28 27   ANION GAP mmol/L 7 8 8 10 8   BUN mg/dL 25 21 14 15 14   CREATININE mg/dL 1 18 1 02 0 86 0 87 0 90   CALCIUM mg/dL 8 4 8 2* 8 1* 8 1* 8 2*   GLUCOSE RANDOM mg/dL 195* 159* 188* 161* 153*     Results from last 7 days   Lab Units 03/01/22  0507 02/28/22  0510 02/27/22  0545 02/26/22  0737 02/25/22  0551   MAGNESIUM mg/dL 2 1 2 1 2 2 2 0 2 3   PHOSPHORUS mg/dL  --   --   --   --  4 7*                   ABG:    VBG:      Results from last 7 days   Lab Units 02/25/22  0551   PROCALCITONIN ng/ml 0 13       Micro        Imaging:   XR chest portable   Final Result by Sneha Batres MD (03/01 0940)      Improvement of interstitial pulmonary edema since 2/23/2022  Right lower lobe subsegmental atelectasis  Probable small left basilar pleural effusion  Workstation performed: [id number]             Micro:   Lab Results   Component Value Date    BLOODCX No Growth After 5 Days  02/24/2022    BLOODCX No Growth After 5 Days  02/24/2022    BLOODCX No Growth After 5 Days  02/23/2022    BLOODCX No Growth After 5 Days   02/23/2022    URINECX >100,000 cfu/ml Escherichia coli (A) 10/29/2021    URINECX >100,000 cfu/ml Escherichia coli (A) 09/28/2021    URINECX >100,000 cfu/ml Lactobacillus species (A) 09/28/2021    SPUTUMCULTUR (A) 10/30/2021     3+ Growth of Methicillin Resistant Staphylococcus aureus    SPUTUMCULTUR Few Colonies of  10/30/2021    MRSACULTURE  02/24/2022     No Methicillin Resistant Staphlyococcus aureus (MRSA) isolated            Invalid input(s): Sonny Ramirez

## 2022-03-08 ENCOUNTER — HOSPITAL ENCOUNTER (EMERGENCY)
Facility: HOSPITAL | Age: 57
Discharge: HOME | End: 2022-03-09
Attending: EMERGENCY MEDICINE | Admitting: EMERGENCY MEDICINE
Payer: MEDICARE

## 2022-03-08 ENCOUNTER — APPOINTMENT (EMERGENCY)
Dept: RADIOLOGY | Facility: HOSPITAL | Age: 57
End: 2022-03-08
Payer: MEDICARE

## 2022-03-08 VITALS
RESPIRATION RATE: 30 BRPM | SYSTOLIC BLOOD PRESSURE: 159 MMHG | DIASTOLIC BLOOD PRESSURE: 73 MMHG | HEART RATE: 77 BPM | OXYGEN SATURATION: 100 % | TEMPERATURE: 98.1 F

## 2022-03-08 DIAGNOSIS — R06.02 SHORTNESS OF BREATH: Primary | ICD-10-CM

## 2022-03-08 LAB
ALBUMIN SERPL BCP-MCNC: 3.6 G/DL (ref 3.5–5)
ALP SERPL-CCNC: 78 U/L (ref 34–104)
ALT SERPL W P-5'-P-CCNC: 13 U/L (ref 7–52)
ANION GAP SERPL CALCULATED.3IONS-SCNC: 11 MMOL/L (ref 4–13)
AST SERPL W P-5'-P-CCNC: 12 U/L (ref 13–39)
BASOPHILS # BLD AUTO: 0.03 THOUSANDS/ΜL (ref 0–0.1)
BASOPHILS NFR BLD AUTO: 0 % (ref 0–1)
BILIRUB SERPL-MCNC: 0.39 MG/DL (ref 0.2–1)
BNP SERPL-MCNC: 349 PG/ML (ref 1–100)
BUN SERPL-MCNC: 16 MG/DL (ref 5–25)
CALCIUM SERPL-MCNC: 9.1 MG/DL (ref 8.4–10.2)
CHLORIDE SERPL-SCNC: 105 MMOL/L (ref 96–108)
CO2 SERPL-SCNC: 24 MMOL/L (ref 21–32)
CREAT SERPL-MCNC: 0.79 MG/DL (ref 0.6–1.3)
EOSINOPHIL # BLD AUTO: 0.15 THOUSAND/ΜL (ref 0–0.61)
EOSINOPHIL NFR BLD AUTO: 2 % (ref 0–6)
ERYTHROCYTE [DISTWIDTH] IN BLOOD BY AUTOMATED COUNT: 17.8 % (ref 11.6–15.1)
GFR SERPL CREATININE-BSD FRML MDRD: 84 ML/MIN/1.73SQ M
GLUCOSE SERPL-MCNC: 243 MG/DL (ref 65–140)
HCT VFR BLD AUTO: 27.2 % (ref 34.8–46.1)
HGB BLD-MCNC: 8.8 G/DL (ref 11.5–15.4)
IMM GRANULOCYTES # BLD AUTO: 0.02 THOUSAND/UL (ref 0–0.2)
IMM GRANULOCYTES NFR BLD AUTO: 0 % (ref 0–2)
LYMPHOCYTES # BLD AUTO: 1.85 THOUSANDS/ΜL (ref 0.6–4.47)
LYMPHOCYTES NFR BLD AUTO: 21 % (ref 14–44)
MCH RBC QN AUTO: 25.4 PG (ref 26.8–34.3)
MCHC RBC AUTO-ENTMCNC: 32.4 G/DL (ref 31.4–37.4)
MCV RBC AUTO: 78 FL (ref 82–98)
MONOCYTES # BLD AUTO: 0.73 THOUSAND/ΜL (ref 0.17–1.22)
MONOCYTES NFR BLD AUTO: 8 % (ref 4–12)
NEUTROPHILS # BLD AUTO: 6.26 THOUSANDS/ΜL (ref 1.85–7.62)
NEUTS SEG NFR BLD AUTO: 69 % (ref 43–75)
NRBC BLD AUTO-RTO: 0 /100 WBCS
PLATELET # BLD AUTO: 320 THOUSANDS/UL (ref 149–390)
PMV BLD AUTO: 10.7 FL (ref 8.9–12.7)
POTASSIUM SERPL-SCNC: 3.7 MMOL/L (ref 3.5–5.3)
PROT SERPL-MCNC: 6.9 G/DL (ref 6.4–8.4)
RBC # BLD AUTO: 3.47 MILLION/UL (ref 3.81–5.12)
SODIUM SERPL-SCNC: 140 MMOL/L (ref 135–147)
WBC # BLD AUTO: 9.04 THOUSAND/UL (ref 4.31–10.16)

## 2022-03-08 PROCEDURE — 96374 THER/PROPH/DIAG INJ IV PUSH: CPT

## 2022-03-08 PROCEDURE — 83880 ASSAY OF NATRIURETIC PEPTIDE: CPT | Performed by: EMERGENCY MEDICINE

## 2022-03-08 PROCEDURE — 99285 EMERGENCY DEPT VISIT HI MDM: CPT | Performed by: EMERGENCY MEDICINE

## 2022-03-08 PROCEDURE — 80053 COMPREHEN METABOLIC PANEL: CPT | Performed by: EMERGENCY MEDICINE

## 2022-03-08 PROCEDURE — 99285 EMERGENCY DEPT VISIT HI MDM: CPT

## 2022-03-08 PROCEDURE — 93005 ELECTROCARDIOGRAM TRACING: CPT

## 2022-03-08 PROCEDURE — 94760 N-INVAS EAR/PLS OXIMETRY 1: CPT

## 2022-03-08 PROCEDURE — 85025 COMPLETE CBC W/AUTO DIFF WBC: CPT | Performed by: EMERGENCY MEDICINE

## 2022-03-08 PROCEDURE — 71045 X-RAY EXAM CHEST 1 VIEW: CPT

## 2022-03-08 PROCEDURE — 36415 COLL VENOUS BLD VENIPUNCTURE: CPT | Performed by: EMERGENCY MEDICINE

## 2022-03-08 RX ORDER — IPRATROPIUM BROMIDE AND ALBUTEROL SULFATE 2.5; .5 MG/3ML; MG/3ML
3 SOLUTION RESPIRATORY (INHALATION)
Status: DISCONTINUED | OUTPATIENT
Start: 2022-03-08 | End: 2022-03-09 | Stop reason: HOSPADM

## 2022-03-08 RX ORDER — FUROSEMIDE 10 MG/ML
80 INJECTION INTRAMUSCULAR; INTRAVENOUS ONCE
Status: COMPLETED | OUTPATIENT
Start: 2022-03-08 | End: 2022-03-08

## 2022-03-08 RX ORDER — METHYLPREDNISOLONE SOD SUCC 125 MG
1 VIAL (EA) INJECTION ONCE
Status: COMPLETED | OUTPATIENT
Start: 2022-03-08 | End: 2022-03-08

## 2022-03-08 RX ADMIN — FUROSEMIDE 80 MG: 10 INJECTION, SOLUTION INTRAVENOUS at 21:51

## 2022-03-08 RX ADMIN — IPRATROPIUM BROMIDE AND ALBUTEROL SULFATE 3 ML: 2.5; .5 SOLUTION RESPIRATORY (INHALATION) at 22:00

## 2022-03-09 ENCOUNTER — TELEPHONE (OUTPATIENT)
Dept: INPATIENT UNIT | Facility: HOSPITAL | Age: 57
End: 2022-03-09

## 2022-03-09 NOTE — ED PROVIDER NOTES
[facility]  EMERGENCY DEPARTMENT NOTE      Pt Name: Jessica Chaudhry  MRN: [id number]  YOB: 1965  Date of evaluation:  3/8/2022  Provider: Vicky Dash MD    CHIEF COMPLAINT     Chief Complaint   Patient presents with    Respiratory Distress     pt presents via EMS after sudden onset of respirtory distress  albuterol tx x1 w / no releif  EMS gave 125 of solumedrol  HISTORY OF PRESENT ILLNESS  (LOCATION/SYMPTOM, TIMING/ONSET, CONTEXT/SETTING, QUALITY, DURATION, MODIFYING FACTORS, SEVERITY)   Note limiting factors  Jessica Chaudhry is a 55 y o  female who presents to the emergency department for respiratory distress  Patient has a trach in place  This evening patient tells me that she was playing on her phone when approximately an hour ago she began to feel like she was having hard time breathing  She attempted to use a neb treatment  She did not feel any better following the treatment  She progressively became more short of breath to the point she needed to call EMS  Patient is not complaining of chest pain  EMS gave her a breathing treatment prior to arrival   She was also given 125 mg Solu-Medrol PTA  She does go on to confirm worsening lower extremity edema  Patient tells me she is taking all her meds appropriately  HPI    Nursing notes were reviewed  REVIEW OF SYSTEMS  (2-9 systems for level 4, ten or more for level 5)     Review of Systems   Constitutional: Negative for chills and fever  HENT: Negative for ear pain and sore throat  Eyes: Negative for pain and visual disturbance  Respiratory: Positive for cough, chest tightness and shortness of breath  Cardiovascular: Negative for chest pain and palpitations  Gastrointestinal: Negative for abdominal pain and vomiting  Genitourinary: Negative for dysuria and hematuria  Musculoskeletal: Negative for arthralgias and back pain  Skin: Negative for color change and rash  Neurological: Negative for seizures and syncope  All other systems reviewed and are negative  PAST MEDICAL HISTORY     Past Medical History:   Diagnosis Date    Anxiety     Aortic aneurysm ([address] )     Arthritis     Depression     Diabetes mellitus (HCC)     Fibromyalgia     GERD (gastroesophageal reflux disease)     GERD (gastroesophageal reflux disease)     H/O cardiovascular stress test 2018    no ischemia  EF 70%   H/O echocardiogram 2019    EF 60%  Mild LVH  trivial effusion   Hyperlipidemia     Hypertension     Migraines     Psychiatric disorder     anxiety    Uncontrolled hypertension 2015    Last Assessment & Plan:  BP today above goal <140/90, apparently asymptomatic  Prior BP elevations were attributed to not taking medication  Consider increased medication if persistent on f/u  SURGICAL HISTORY     Past Surgical History:   Procedure Laterality Date    BACK SURGERY      Lumbar epidural steroid injection    CARDIAC CATHETERIZATION N/A 10/22/2021    Procedure: Cardiac pci;  Surgeon: Brandt Daniel MD;  [facility]; Service: Cardiology    CARDIAC CATHETERIZATION  10/22/2021    Procedure: Cardiac catheterization;  Surgeon: Brandt Daniel MD;  [facility]; Service: Cardiology    CARDIAC CATHETERIZATION Left 10/27/2021    Procedure: Cardiac Left Heart Cath;  Surgeon: Emma Dang MD;  [facility]; Service: Cardiology    CARDIAC CATHETERIZATION N/A 10/27/2021    Procedure: Cardiac pci;  Surgeon: Emma Dang MD;  [facility]; Service: Cardiology    CARDIAC CATHETERIZATION N/A 10/28/2021    Procedure: Cardiac pci;  Surgeon: Elizabeth Dash DO;  [facility];   Service: Cardiology    CARPAL TUNNEL RELEASE Left      SECTION      CHOLECYSTECTOMY      COLONOSCOPY      incomplete    COLONOSCOPY      EYE SURGERY      HYSTERECTOMY      Total    OVARIAN CYST REMOVAL      PEG W/TRACHEOSTOMY PLACEMENT N/A 11/12/2021    Procedure: TRACHEOSTOMY WITH INSERTION PEG TUBE;  Surgeon: Bre Bauer To, DO;  Location: [facility];  Service: General    TUBAL LIGATION      UPPER GASTROINTESTINAL ENDOSCOPY           CURRENT MEDICATIONS     Previous Medications    ACETAMINOPHEN (TYLENOL) 160 MG/5 ML SUSPENSION    30 4 mL (975 mg total) by Per G Tube route every 6 (six) hours as needed for mild pain or fever    ALBUTEROL (2 5 MG/3 ML) 0 083 % NEBULIZER SOLUTION    Take 3 mL (2 5 mg total) by nebulization every 4 (four) hours as needed for wheezing    AMIODARONE 200 MG TABLET    Take 1 tablet (200 mg total) by mouth daily with breakfast for 14 days    APIXABAN (ELIQUIS) 5 MG    Take 1 tablet (5 mg total) by mouth 2 (two) times a day for 7 days    APIXABAN (ELIQUIS) 5 MG    Take 1 tablet (5 mg total) by mouth 2 (two) times a day for 14 days    ATORVASTATIN (LIPITOR) 40 MG TABLET    Take 1 tablet (40 mg total) by mouth daily for 14 days    BLOOD PRESSURE MONITORING (SPHYGMOMANOMETER) MISC    Use 2 (two) times a day    CHLORHEXIDINE (PERIDEX) 0 12 % SOLUTION    Apply 15 mL to the mouth or throat every 12 (twelve) hours    FAMOTIDINE (PEPCID) 20 MG/2 5 ML ORAL SUSPENSION    Take 2 5 mL (20 mg total) by mouth 2 (two) times a day    FUROSEMIDE (LASIX) 20 MG TABLET    Take 1 tablet (20 mg total) by mouth daily for 14 days    INSULIN GLARGINE (BASAGLAR KWIKPEN) 100 UNITS/ML INJECTION PEN    Inject 8 Units under the skin daily for 14 days    INSULIN GLULISINE (APIDRA SOLOSTAR) 100 UNITS/ML INJECTION PEN    Inject 2 Units under the skin 3 (three) times a day with meals for 14 days    INSULIN PEN NEEDLE (UNIFINE PENTIPS PLUS) 31G X 6 MM MISC    1 Device by Does not apply route 4 (four) times a day    IPRATROPIUM (ATROVENT) 0 02 % NEBULIZER SOLUTION    Take 2 5 mL (0 5 mg total) by nebulization 3 (three) times a day    LANCETS MISC    1 Device by Does not apply route 4 (four) times a day    LEVALBUTEROL (XOPENEX) 1 25 MG/0 5 ML NEBULIZER SOLUTION    Take 0 5 mL (1 25 mg total) by nebulization 3 (three) times a day    LORAZEPAM (ATIVAN) 1 MG TABLET    Take 0 5 tablets (0 5 mg total) by mouth 2 (two) times a day as needed for anxiety for up to 5 days    LOSARTAN (COZAAR) 50 MG TABLET    Take 1 tablet (50 mg total) by mouth daily for 14 days    MELATONIN 3 MG    Take 2 tablets (6 mg total) by mouth daily at bedtime    METOPROLOL TARTRATE (LOPRESSOR) 25 MG TABLET    Take 1 tablet (25 mg total) by mouth every 12 (twelve) hours for 14 days    ONDANSETRON (ZOFRAN) 4 MG/2 ML INJECTION    Infuse 2 mL (4 mg total) into a venous catheter every 6 (six) hours as needed for nausea or vomiting    POLYETHYLENE GLYCOL (MIRALAX) 17 G PACKET    Take 17 g by mouth daily    POTASSIUM CHLORIDE 10% ORAL SOLUTION    Take 15 mL (20 mEq total) by mouth daily    PREGABALIN (LYRICA) 75 MG CAPSULE    TAKE ONE CAPSULE EVERY DAY    QUETIAPINE (SEROQUEL) 25 MG TABLET    Take 1 tablet (25 mg total) by mouth daily at bedtime    SENNA-DOCUSATE SODIUM (SENOKOT S) 8 6-50 MG PER TABLET    Take 1 tablet by mouth daily at bedtime    TICAGRELOR (BRILINTA) 90 MG    Take 1 tablet (90 mg total) by mouth every 12 (twelve) hours for 14 days         ALLERGIES   Metformin and Clonidine      SOCIAL HISTORY     Social History     Socioeconomic History    Marital status: Legally      Spouse name: Not on file    Number of children: Not on file    Years of education: Not on file    Highest education level: Not on file   Occupational History    Not on file   Tobacco Use    Smoking status: Former Smoker     Packs/day: 1 00     Years: 30 00     Pack years: 30 00     Types: Cigarettes    Smokeless tobacco: Never Used   Vaping Use    Vaping Use: Never used   Substance and Sexual Activity    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       Drug use: Yes     Types: Marijuana     Comment: medical; seldom use    Sexual activity: Yes     Birth control/protection: Female Sterilization     Comment: Monogamous relationship with monogamous partner   Other Topics Concern    Not on file   Social History Narrative    Most recent tobacco use screenin2019    Do you currently or have you served in the [employer]: No    Were you activated, into active duty, as a member of the [employer] or as a Reservist: No    Advance directive: No    Chewing tobacco: none    Alcohol intake: None    Marital status: Single    Live alone or with others: with others    Family history of heart disease:    aortic aneurysm    High cholesterol: Yes    High blood pressure: Yes    Exercise level: Heavy    Overweight: Yes    Obese: Yes    Diabetes: Yes    General stress level: High    Diet: Regular    Caffeine intake: Occasional    Guns present in home: No    Seat belts used routinely: Yes    Sexual orientation: Heterosexual    Sunscreen used routinely: No    Seat belt/car seat used routinely: Yes    Exercise-How often do you exercise: as tolerated    Do you have regular medical check ups: Yes    Do you have regular dental check ups: Yes    Illicit drugs-years of use:    recovery 23 years - HX of     Social Determinants of Health     Financial Resource Strain: Not on file   Food Insecurity: Food Insecurity Present    Worried About Running Out of Food in the Last Year: Sometimes true    Lela of Food in the Last Year: Sometimes true   Transportation Needs: No Transportation Needs    Lack of Transportation (Medical): No    Lack of Transportation (Non-Medical):  No   Physical Activity: Not on file   Stress: Not on file   Social Connections: Not on file   Intimate Partner Violence: Not on file   Housing Stability: Low Risk     Unable to Pay for Housing in the Last Year: No    Number of Places Lived in the Last Year: 1    Unstable Housing in the Last Year: No       Screenings       PHYSICAL EXAM  (Up to 7 for level 4, 8 or more for level 5) Physical Exam  Vitals and nursing note reviewed  Constitutional:       Appearance: Normal appearance  She is not ill-appearing  HENT:      Head: Normocephalic and atraumatic  Nose: Nose normal       Mouth/Throat:      Mouth: Mucous membranes are moist       Pharynx: Oropharynx is clear  Eyes:      Extraocular Movements: Extraocular movements intact  Pupils: Pupils are equal, round, and reactive to light  Neck:      Trachea: Tracheostomy present  Comments: Patient has a tracheostomy that sounds occluded  We attempted to suction the trach, but we did not have the tube to do it  We had to wait for respiratory  Respiratory was able to suction the patient  Cardiovascular:      Rate and Rhythm: Normal rate and regular rhythm  Heart sounds: No murmur heard  Pulmonary:      Effort: Tachypnea, accessory muscle usage, prolonged expiration, respiratory distress and retractions present  Breath sounds: Examination of the right-upper field reveals decreased breath sounds and rhonchi  Examination of the left-upper field reveals decreased breath sounds and rhonchi  Examination of the right-lower field reveals decreased breath sounds, rhonchi and rales  Examination of the left-lower field reveals decreased breath sounds, rhonchi and rales  Decreased breath sounds, rhonchi and rales present  No wheezing  Skin:     General: Skin is warm and dry  Capillary Refill: Capillary refill takes less than 2 seconds  Neurological:      Mental Status: She is alert  Psychiatric:         Attention and Perception: Attention and perception normal          Mood and Affect: Mood is anxious  Speech: Speech normal          Behavior: Behavior is agitated  Behavior is not aggressive or combative           Cognition and Memory: Cognition and memory normal          Diagnostic results   EKG:  All EKGs are interpreted by the emergency department physician who either signs or cosigned discharge in the absence of a cardiologist     ECG 12 Lead Documentation Only    Date/Time: 3/8/2022 10:08 PM  Performed by: Dipika Burt MD  Authorized by: Dipika Burt MD     Indications / Diagnosis:  SOB  ECG reviewed by me, the ED Provider: yes    Patient location:  ED  Previous ECG:     Previous ECG:  Unavailable    Comparison to cardiac monitor: Yes    Interpretation:     Interpretation: abnormal    Rate:     ECG rate:  75    ECG rate assessment: normal    Rhythm:     Rhythm: sinus rhythm    Ectopy:     Ectopy: none    QRS:     QRS axis:  Left    QRS intervals:  Normal  Conduction:     Conduction: normal    ST segments:     ST segments:  Normal      Radiology:  Non plain film images suggest CT, ultrasound and MRI are read by the radiologist   Plain radiographic images are visualized and preliminarily interpreted by the emergency physician see below findings:    XR chest portable   Final Result      No focal consolidation, pleural effusion, or pneumothorax                    Workstation performed: [id number]               LABS:  Results Reviewed     Procedure Component Value Units Date/Time    B-Type Natriuretic Peptide(BNP) CA, GH, EA [facility] Only [[id number]]  (Abnormal) Collected: 03/08/22 2154    Lab Status: Final result Specimen: Blood from Arm, Right Updated: 03/08/22 2226      pg/mL     Comprehensive metabolic panel [[id number]]  (Abnormal) Collected: 03/08/22 2154    Lab Status: Final result Specimen: Blood from Arm, Right Updated: 03/08/22 2223     Sodium 140 mmol/L      Potassium 3 7 mmol/L      Chloride 105 mmol/L      CO2 24 mmol/L      ANION GAP 11 mmol/L      BUN 16 mg/dL      Creatinine 0 79 mg/dL      Glucose 243 mg/dL      Calcium 9 1 mg/dL      AST 12 U/L      ALT 13 U/L      Alkaline Phosphatase 78 U/L      Total Protein 6 9 g/dL      Albumin 3 6 g/dL      Total Bilirubin 0 39 mg/dL      eGFR 84 ml/min/1 73sq m     Narrative:      [place] guidelines for Chronic Kidney Disease (CKD):     Stage 1 with normal or high GFR (GFR > 90 mL/min/1 73 square meters)    Stage 2 Mild CKD (GFR = 60-89 mL/min/1 73 square meters)    Stage 3A Moderate CKD (GFR = 45-59 mL/min/1 73 square meters)    Stage 3B Moderate CKD (GFR = 30-44 mL/min/1 73 square meters)    Stage 4 Severe CKD (GFR = 15-29 mL/min/1 73 square meters)    Stage 5 End Stage CKD (GFR <15 mL/min/1 73 square meters)  Note: GFR calculation is accurate only with a steady state creatinine    CBC and differential [[id number]]  (Abnormal) Collected: 03/08/22 2154    Lab Status: Final result Specimen: Blood from Arm, Right Updated: 03/08/22 2201     WBC 9 04 Thousand/uL      RBC 3 47 Million/uL      Hemoglobin 8 8 g/dL      Hematocrit 27 2 %      MCV 78 fL      MCH 25 4 pg      MCHC 32 4 g/dL      RDW 17 8 %      MPV 10 7 fL      Platelets 422 Thousands/uL      nRBC 0 /100 WBCs      Neutrophils Relative 69 %      Immat GRANS % 0 %      Lymphocytes Relative 21 %      Monocytes Relative 8 %      Eosinophils Relative 2 %      Basophils Relative 0 %      Neutrophils Absolute 6 26 Thousands/µL      Immature Grans Absolute 0 02 Thousand/uL      Lymphocytes Absolute 1 85 Thousands/µL      Monocytes Absolute 0 73 Thousand/µL      Eosinophils Absolute 0 15 Thousand/µL      Basophils Absolute 0 03 Thousands/µL           All other labs were within normal range or not returned as of this dictation  EMERGENCY DEPARTMENT COURSE AND DIFFERENTIAL DIAGNOSIS/MDM:   VITALS:   Vitals:    03/08/22 2142 03/08/22 2159 03/08/22 2201   BP:   159/73   BP Location:   Right arm   Pulse: 84  77   Resp: (!) 30  (!) 30   Temp:  98 1 °F (36 7 °C)    TempSrc:  Oral    SpO2: 99%  100%       MDM  Number of Diagnoses or Management Options  Shortness of breath: new and requires workup  Diagnosis management comments: Patient presented to the ED for respiratory distress  She has a history of CHF    Overall, the workup did not significantly point to an acute cause for her respiratory distress  I suspect that patient had a slight occlusion in her trach which then led to her having a issue with anxiety  I feel that she got to a point where she did not think that she could breathe and this just exacerbated the problem  Patient was treated with albuterol here in the ED  After about 20 minutes, patient looked very well  She was significantly more comfortable  She was playing on her phone and smiling  Patient did not have any chest pain  She was no longer struggling to catch her breath  Patient was treated for COPD as well as CHF  I do not believe either was a significant issue at night  She has no prior history of COPD even though EMS had reported initially  The chest x-ray did not show pulmonary edema  Given the fact that the patient looks extremely well, I do not feel that she needs to be admitted  I discussed all this with the patient  She agrees and would prefer to go home  At this time her vital signs are stable  She looks well  I will discharge her home with instructions to follow-up with her primary care  Patient Progress  Patient progress: stable      Reassessment:      Medications   ipratropium-albuterol (DUO-NEB) 0 5-2 5 mg/3 mL inhalation solution 3 mL (3 mL Nebulization Given 3/8/22 2200)   methylPREDNISolone sodium succinate (FOR EMS ONLY) (Solu-MEDROL) 125 MG injection 125 mg (0 mg Does not apply Given to EMS 3/8/22 2148)   furosemide (LASIX) injection 80 mg (80 mg Intravenous Given 3/8/22 2151)       CONSULTS:    Unless otherwise noted below none  FINAL IMPRESSION     1   Shortness of breath            DISPOSITION/PLAN         PATIENT REFERRED TO:  Keegan Becerra MD  [address]  [phone]    Call in 2 days          DISCHARGE MEDICATIONS:  New Prescriptions    No medications on file           (Please note that the portions of the snow were completed with voice recognition program   Efforts were made to admit the dictations but occasionally words are missed transcribed )    Priscilla Sherman MD (electronically signed) emergency physician                             Priscilla Sherman MD  03/09/22 [id number]

## 2022-03-11 ENCOUNTER — PATIENT OUTREACH (OUTPATIENT)
Dept: FAMILY MEDICINE CLINIC | Facility: CLINIC | Age: 57
End: 2022-03-11

## 2022-03-11 DIAGNOSIS — I50.23 ACUTE ON CHRONIC SYSTOLIC HEART FAILURE (HCC): ICD-10-CM

## 2022-03-11 LAB
ATRIAL RATE: 77 BPM
P AXIS: 29 DEGREES
PR INTERVAL: 171 MS
QRS AXIS: -39 DEGREES
QRSD INTERVAL: 134 MS
QT INTERVAL: 488 MS
QTC INTERVAL: 546 MS
T WAVE AXIS: 45 DEGREES
VENTRICULAR RATE: 75 BPM

## 2022-03-11 PROCEDURE — 93010 ELECTROCARDIOGRAM REPORT: CPT | Performed by: INTERNAL MEDICINE

## 2022-03-11 NOTE — PROGRESS NOTES
Outreach to patient  Pt advises that it is not a good time to talk  Agreeable to CM outreach next week  Chart review completed

## 2022-03-13 RX ORDER — FUROSEMIDE 20 MG/1
20 TABLET ORAL DAILY
Qty: 14 TABLET | Refills: 0 | Status: SHIPPED | OUTPATIENT
Start: 2022-03-13 | End: 2022-03-21 | Stop reason: SDUPTHER

## 2022-03-14 ENCOUNTER — TELEMEDICINE (OUTPATIENT)
Dept: FAMILY MEDICINE CLINIC | Facility: CLINIC | Age: 57
End: 2022-03-14
Payer: MEDICARE

## 2022-03-14 DIAGNOSIS — Z93.0 TRACHEOSTOMY IN PLACE (HCC): ICD-10-CM

## 2022-03-14 DIAGNOSIS — I47.2 VENTRICULAR TACHYCARDIA (HCC): ICD-10-CM

## 2022-03-14 DIAGNOSIS — F41.9 ANXIETY: Primary | ICD-10-CM

## 2022-03-14 DIAGNOSIS — I25.10 CORONARY ARTERY DISEASE INVOLVING NATIVE CORONARY ARTERY OF NATIVE HEART WITHOUT ANGINA PECTORIS: ICD-10-CM

## 2022-03-14 PROCEDURE — 99442 PR PHYS/QHP TELEPHONE EVALUATION 11-20 MIN: CPT | Performed by: FAMILY MEDICINE

## 2022-03-14 RX ORDER — LORAZEPAM 1 MG/1
0.5 TABLET ORAL 2 TIMES DAILY PRN
Qty: 10 TABLET | Refills: 0 | Status: SHIPPED | OUTPATIENT
Start: 2022-03-14 | End: 2022-04-07 | Stop reason: SDUPTHER

## 2022-03-14 RX ORDER — ESCITALOPRAM OXALATE 10 MG/1
10 TABLET ORAL DAILY
Qty: 60 TABLET | Refills: 0 | Status: SHIPPED | OUTPATIENT
Start: 2022-03-14 | End: 2022-05-16

## 2022-03-14 NOTE — PROGRESS NOTES
Virtual Regular Visit    Verification of patient location:    Patient is located in the following state in which I hold an active license [place]      Assessment/Plan:    Problem List Items Addressed This Visit        Cardiovascular and Mediastinum    History of Ventricular tachycardia ([address] )     -Patient denies palpitations or chest pain, taking medications as prescribed since discharge  -Referral to Cardiology/EP made today for evaluation of possible ICD as ventricular arrhythmias thought to be related to LCx STEMI  -Patient also needs Cardiology evaluation as follow up for CAD/CHF         Relevant Orders    Ambulatory Referral to Cardiac Electrophysiology    CAD (coronary artery disease)    Relevant Orders    Ambulatory Referral to Cardiac Electrophysiology       Other    Anxiety - Primary     -Patient with history of anxiety prior to cardiopulmonary medical complications as outlined in HPI but anxiety much more significant since dealing with her health issues and breathing problems  -Amenable to starting SSRI at this time, trial lexapro 10 mg QHS  -Short course of ativan refilled today for PRN use while awaiting therapeutic efficacy of SSRI; PDMP reviewed, no red flags  -Discussed with patient that BZD is not long-term treatment of anxiety; if monotherapy with SSRI not effective will add buspar  -Patient advised that anti-depressant may take 4-6 weeks to take effect  -Encouraged to take medication without missed doses to obtain maximum benefit  -Discussed risk of rebound symptoms with inconsistent drug administration  -Possible side effects of medication discussed  -ED precautions for passive/active SI/HI  -Will discuss possible therapy referral at home visit 3/21/22          Relevant Medications    escitalopram (Lexapro) 10 mg tablet    LORazepam (Ativan) 1 mg tablet    Tracheostomy in place [facility])     -Denies respiratory distress today, breathing stable on O2 via trach  -No PFTs done to confirm COPD but it is highly likely 2/2 emphysematous changes on pulmonary imaging in the setting of tobacco abuse   -Referral given today for outpatient evaluation with Pulmonology for suspected COPD as well as to determine whether tracheostomy will be needed indefinitely for airway management (especially given documented hx of subglottic stenosis)         Relevant Orders    Ambulatory Referral to Pulmonology             Reason for visit is follow up  Encounter provider Oumou Muhammad MD    Provider located at [address] [place], [place] [id number] [facility]  [phone]      Recent Visits  No visits were found meeting these conditions  Showing recent visits within past 7 days and meeting all other requirements  Today's Visits  Date Type Provider Dept   03/14/22 Telemedicine Alycia Conde [id number] today's visits and meeting all other requirements  Future Appointments  No visits were found meeting these conditions  Showing future appointments within next 150 days and meeting all other requirements       The patient was identified by name and date of birth  Kelli Red was informed that this is a telemedicine visit and that the visit is being conducted through Telephone  My office door was closed  No one else was in the room  She acknowledged consent and understanding of privacy and security of the video platform  The patient has agreed to participate and understands they can discontinue the visit at any time  Patient is aware this is a billable service  It was my intent to perform this visit via video technology but the patient was not able to do a video connection so the visit was completed via audio telephone only  Subjective  Kelli ENCINAS Negro Rasheed is a 55 y o  female        HPI     Patient with hx of CAD s/p PCI, HFpEF, HTN, HLD, tobacco abuse with suspected COPD, CHANTALE, T2DM, thoracic ascending aortic dilatation (4 3cm), anxiety with depression and fibromyalgia is a virtual follow up today for chronic conditions  She has NOT been seen in-office since she had a very complicated cardiopulmonary clinical course during admission at [facility] 10/22/21 - 11/23/21, as follows, obtained via extensive chart review performed by author:     · Lateral STEMI 10/22/21 s/p PATRICA in mid circumflex into OM1; OM2 was ballooned but not stented  · Had long runs of VT 10/26/21 that broke into NSR s/p amio but early the next morning 10/27/21 she had cardiac arrest 2/2 pulseless monomorphic VT s/p CPR & cardioversion; she had a repeat catheterization 10/27/21 which showed similar flow of LCx with patent stent but area akinetic; no intervention at that time  · After repeat cardiac arrest (polymorphic VT) on 10/28/21 with intubation and concern for stent thrombosis she was taken to cath lab again where she was found to have apical LAD complete occlusion from distal thrombotic embolism (thought to be paradoxical related to previous cath or spontaneous 2/2 low coronary flow during cardiac arrest) but was felt to be too small to be intervened via thrombectomy per records  · Ventricular arrhythmias thought to be resultant from scarring 2/2 late presentation of LCx STEMI and patient recommended to follow up outpatient with EP for likely ICD  · Patient was initially intubated 10/28/21 during cardiac arrest and extubated 11/7/21  · She was intubated again 11/10/21 due to difficulty with secretion management and had trach and PEG placed 11/12/21  Additional medical complications during that hospitalization included:  · Patient was also treated for MRSA multifocal pneumonia via vanc and zyvox per ID  · Followed by Neurology due to MRI brain 11/5/21 showing multiple infarcts of white matter and corpus callosum likely watershed embolic infarcts related to multiple episodes hypotension and cardiac arrest and/or Afib   Also found to have 2 mm R ICA terminus aneurysm on MRA, no intervention    Since that initial hospitalization, patient has had subsequent readmissions and ED visits related to subjective SOB:  · Most recently admitted 2/24/22 - 3/3/22 with trach replaced during that admission and CHF exacerbation managed via diuresis  · Patient also with chronic HFpEF, last echo 2/24/22 showing EF approx  50% with trace MR and moderate LVH; (+) RWMA involving inferior and basal anterolateral walls 2/2 old lateral infarction  · ED visit 3/8/22 did not result in admission  Currently her maintenance diuretic is Lasix 20 mg daily but patient has been taking BID as directed at recent discharge from ED for increased LE edema  Patient is looking forward to home visit scheduled 3/21/22 but does have a few issues to discuss today  Patient has not yet followed up with Cardiology/EP and does not have any appointments scheduled, needs referral  She has not had PFTs to confirm COPD but it is highly likely 2/2 emphysematous changes on pulmonary imaging in the setting of tobacco abuse and she has not followed up as outpatient with Pulmonology for evaluation, needs referral  States her breathing is stable at this time and she uses O2 and moist air around the clock via trach, and her nephew, Genet Vee ([phone]) is helping her, states he was trained to do suction and trach care for [facility], he requests the trach cleaning kits be increased from 30 per month to 60 per month as he is going through them quickly  Also, he is interested in getting paid to care for her and [facility] states she wants Genet Vee to be her caregiver as well  Complex care management is following with patient per records  She has PT & OT 2 days per week, DME pharmacy is [facility]  PEG has since been removed per patient and she is eating normally  Taking meds as prescribed and denies any chest pain/tightness or palpitations; today patient says LE edema is stable on the BID lasix 20 mg  Thinks she has compression stockings she will try   Patient also asking for refill on ativan, stating it has been helping with her anxiety  She reports being on lexapro in the past for anxiety and she is amenable to restarting SSRI at this time but would like to continue BZD until SSRI is working for her  Reviewed PDMP, no red flags  Past Medical History:   Diagnosis Date    Anxiety     Aortic aneurysm ([address] )     Arthritis     Depression     Diabetes mellitus (HCC)     Fibromyalgia     GERD (gastroesophageal reflux disease)     GERD (gastroesophageal reflux disease)     H/O cardiovascular stress test 2018    no ischemia  EF 70%   H/O echocardiogram 2019    EF 60%  Mild LVH  trivial effusion   Hyperlipidemia     Hypertension     Migraines     Psychiatric disorder     anxiety    Uncontrolled hypertension 2015    Last Assessment & Plan:  BP today above goal <140/90, apparently asymptomatic  Prior BP elevations were attributed to not taking medication  Consider increased medication if persistent on f/u  Past Surgical History:   Procedure Laterality Date    BACK SURGERY      Lumbar epidural steroid injection    CARDIAC CATHETERIZATION N/A 10/22/2021    Procedure: Cardiac pci;  Surgeon: Bjorn Reece MD;  [facility]; Service: Cardiology    CARDIAC CATHETERIZATION  10/22/2021    Procedure: Cardiac catheterization;  Surgeon: Bjorn Reece MD;  [facility]; Service: Cardiology    CARDIAC CATHETERIZATION Left 10/27/2021    Procedure: Cardiac Left Heart Cath;  Surgeon: Elana Oquendo MD;  [facility]; Service: Cardiology    CARDIAC CATHETERIZATION N/A 10/27/2021    Procedure: Cardiac pci;  Surgeon: Elana Oquendo MD;  [facility]; Service: Cardiology    CARDIAC CATHETERIZATION N/A 10/28/2021    Procedure: Cardiac pci;  Surgeon: Che Brown DO;  [facility];   Service: Cardiology    CARPAL TUNNEL RELEASE Left      SECTION      CHOLECYSTECTOMY      COLONOSCOPY      incomplete    COLONOSCOPY      EYE SURGERY      HYSTERECTOMY      Total    OVARIAN CYST REMOVAL      PEG W/TRACHEOSTOMY PLACEMENT N/A 11/12/2021    Procedure: TRACHEOSTOMY WITH INSERTION PEG TUBE;  Surgeon: Baldemar Fenton To, DO;  Location: [facility] MAIN OR;  Service: General    TUBAL LIGATION      UPPER GASTROINTESTINAL ENDOSCOPY         Current Outpatient Medications   Medication Sig Dispense Refill    acetaminophen (TYLENOL) 160 mg/5 mL suspension 30 4 mL (975 mg total) by Per G Tube route every 6 (six) hours as needed for mild pain or fever  0    albuterol (2 5 mg/3 mL) 0 083 % nebulizer solution Take 3 mL (2 5 mg total) by nebulization every 4 (four) hours as needed for wheezing  0    amiodarone 200 mg tablet Take 1 tablet (200 mg total) by mouth daily with breakfast for 14 days 14 tablet 0    apixaban (Eliquis) 5 mg Take 1 tablet (5 mg total) by mouth 2 (two) times a day for 7 days 74 tablet 0    apixaban (ELIQUIS) 5 mg Take 1 tablet (5 mg total) by mouth 2 (two) times a day for 14 days 28 tablet 0    atorvastatin (LIPITOR) 40 mg tablet Take 1 tablet (40 mg total) by mouth daily for 14 days 14 tablet 0    Blood Pressure Monitoring (Sphygmomanometer) MISC Use 2 (two) times a day (Patient not taking: Reported on 7/13/2021) 1 each 0    chlorhexidine (PERIDEX) 0 12 % solution Apply 15 mL to the mouth or throat every 12 (twelve) hours 120 mL 0    famotidine (PEPCID) 20 mg/2 5 mL oral suspension Take 2 5 mL (20 mg total) by mouth 2 (two) times a day  0    furosemide (LASIX) 20 mg tablet Take 1 tablet (20 mg total) by mouth daily for 14 days 14 tablet 0    insulin glargine (Basaglar KwikPen) 100 units/mL injection pen Inject 8 Units under the skin daily for 14 days 1 12 mL 0    insulin glulisine (Apidra SoloStar) 100 units/mL injection pen Inject 2 Units under the skin 3 (three) times a day with meals for 14 days 0 84 mL 0    Insulin Pen Needle (UNIFINE PENTIPS PLUS) 31G X 6 MM MISC 1 Device by Does not apply route 4 (four) times a day      ipratropium (ATROVENT) 0 02 % nebulizer solution Take 2 5 mL (0 5 mg total) by nebulization 3 (three) times a day  0    Lancets MISC 1 Device by Does not apply route 4 (four) times a day (Patient not taking: Reported on 7/13/2021)      levalbuterol (XOPENEX) 1 25 mg/0 5 mL nebulizer solution Take 0 5 mL (1 25 mg total) by nebulization 3 (three) times a day  0    LORazepam (Ativan) 1 mg tablet Take 0 5 tablets (0 5 mg total) by mouth 2 (two) times a day as needed for anxiety for up to 5 days 5 tablet 0    losartan (COZAAR) 50 mg tablet Take 1 tablet (50 mg total) by mouth daily for 14 days 14 tablet 0    melatonin 3 mg Take 2 tablets (6 mg total) by mouth daily at bedtime  0    metoprolol tartrate (LOPRESSOR) 25 mg tablet Take 1 tablet (25 mg total) by mouth every 12 (twelve) hours for 14 days 28 tablet 0    ondansetron (ZOFRAN) 4 mg/2 mL injection Infuse 2 mL (4 mg total) into a venous catheter every 6 (six) hours as needed for nausea or vomiting  0    polyethylene glycol (MIRALAX) 17 g packet Take 17 g by mouth daily  0    potassium chloride 10% oral solution Take 15 mL (20 mEq total) by mouth daily  0    pregabalin (LYRICA) 75 mg capsule TAKE ONE CAPSULE EVERY DAY 90 capsule 1    QUEtiapine (SEROquel) 25 mg tablet Take 1 tablet (25 mg total) by mouth daily at bedtime  0    senna-docusate sodium (SENOKOT S) 8 6-50 mg per tablet Take 1 tablet by mouth daily at bedtime  0    ticagrelor (BRILINTA) 90 MG Take 1 tablet (90 mg total) by mouth every 12 (twelve) hours for 14 days 28 tablet 0     No current facility-administered medications for this visit  Allergies   Allergen Reactions    Metformin Diarrhea    Clonidine Rash     Patch        Review of Systems   Constitutional: Positive for fatigue  Negative for chills and fever  Respiratory: Negative for chest tightness and shortness of breath           Trach collar in place Cardiovascular: Negative for chest pain and palpitations  LE edema stable   Gastrointestinal: Negative for abdominal pain, nausea and vomiting  Psychiatric/Behavioral: Negative for sleep disturbance and suicidal ideas  The patient is nervous/anxious  All other systems reviewed and are negative  Video Exam    There were no vitals filed for this visit  Physical Exam    [Physical exam limited 2/2 telephone encounter, but no conversational dyspnea or distress noted]    I spent 15 minutes directly with the patient during this visit    VIRTUAL VISIT [address] verbally agrees to participate in [employer]  Pt is aware that [employer] could be limited without vital signs or the ability to perform a full hands-on physical exam  Kellimario alberto Red understands she or the provider may request at any time to terminate the video visit and request the patient to seek care or treatment in person

## 2022-03-14 NOTE — ASSESSMENT & PLAN NOTE
-Patient with history of anxiety prior to cardiopulmonary medical complications as outlined in HPI but anxiety much more significant since dealing with her health issues and breathing problems  -Amenable to starting SSRI at this time, trial lexapro 10 mg QHS  -Short course of ativan refilled today for PRN use while awaiting therapeutic efficacy of SSRI; PDMP reviewed, no red flags  -Discussed with patient that BZD is not long-term treatment of anxiety; if monotherapy with SSRI not effective will add buspar  -Patient advised that anti-depressant may take 4-6 weeks to take effect  -Encouraged to take medication without missed doses to obtain maximum benefit  -Discussed risk of rebound symptoms with inconsistent drug administration  -Possible side effects of medication discussed  -ED precautions for passive/active SI/HI  -Will discuss possible therapy referral at home visit 3/21/22

## 2022-03-14 NOTE — ASSESSMENT & PLAN NOTE
-Denies respiratory distress today, breathing stable on O2 via trach  -No PFTs done to confirm COPD but it is highly likely 2/2 emphysematous changes on pulmonary imaging in the setting of tobacco abuse   -Referral given today for outpatient evaluation with Pulmonology for suspected COPD as well as to determine whether tracheostomy will be needed indefinitely for airway management (especially given documented hx of subglottic stenosis)

## 2022-03-14 NOTE — ASSESSMENT & PLAN NOTE
-Patient denies palpitations or chest pain, taking medications as prescribed since discharge  -Referral to Cardiology/EP made today for evaluation of possible ICD as ventricular arrhythmias thought to be related to LCx STEMI  -Patient also needs Cardiology evaluation as follow up for CAD/CHF

## 2022-03-15 ENCOUNTER — TELEPHONE (OUTPATIENT)
Dept: FAMILY MEDICINE CLINIC | Facility: CLINIC | Age: 57
End: 2022-03-15

## 2022-03-15 DIAGNOSIS — G47.00 INSOMNIA, UNSPECIFIED TYPE: Primary | ICD-10-CM

## 2022-03-15 DIAGNOSIS — J96.01 ACUTE HYPOXEMIC RESPIRATORY FAILURE (HCC): ICD-10-CM

## 2022-03-15 PROBLEM — J96.11 CHRONIC HYPOXEMIC RESPIRATORY FAILURE (HCC): Status: ACTIVE | Noted: 2022-03-15

## 2022-03-15 RX ORDER — LANOLIN ALCOHOL/MO/W.PET/CERES
6 CREAM (GRAM) TOPICAL
Qty: 180 TABLET | Refills: 0 | Status: ON HOLD | OUTPATIENT
Start: 2022-03-15

## 2022-03-15 NOTE — TELEPHONE ENCOUNTER
Patients nephew sheldon called requesting refill of patients sleep medication  [place]oz also requesting a call back regarding patient   [phone]

## 2022-03-15 NOTE — TELEPHONE ENCOUNTER
FYI:    [facility] health OT called stating that they will be faxing over an order to be signed for a   Patient is requesting more help at home and they feel a  will be able to help arrange this   Thank you

## 2022-03-15 NOTE — TELEPHONE ENCOUNTER
I refilled the melatonin, I am assuming that is the sleep medication he is referring to  I refilled the ativan yesterday but told them it is only a short course and to be used while we are waiting for the lexapro to kick in; if she is using ativan for sleep, I would change it to something else in the future  What did he want to discuss? I just spoke with them both yesterday, can you find out what he wants to discuss? Thanks!

## 2022-03-16 ENCOUNTER — TELEPHONE (OUTPATIENT)
Dept: FAMILY MEDICINE CLINIC | Facility: CLINIC | Age: 57
End: 2022-03-16

## 2022-03-16 NOTE — TELEPHONE ENCOUNTER
A form from [facility] needs to be signed for this patient  Placed in you folder in preceptor room  Please advise, thank you

## 2022-03-18 ENCOUNTER — TELEPHONE (OUTPATIENT)
Dept: INPATIENT UNIT | Facility: HOSPITAL | Age: 57
End: 2022-03-18

## 2022-03-18 NOTE — TELEPHONE ENCOUNTER
CHF Evaluation/Screening Form    Patient Level Data  Encounter Level Data  Knowledge Assessment/Post-Discharge Questions  Following Diet: Foods to Limit/Avoid Salt: Yes  Daily Weights Done?: No (Comment: Patient states that she has not weighed herself for 2 days  Patient encouraged to weigh herself daily )  Knows name of medication/water pill?: Yes  Understands Activity/Exercise:  Yes  Knows when to report symptoms?: Yes  Plan in place to call for worsening symptoms?: Yes  Does the patient have a means of transporation?: Yes  Who is the primary learner?: Patient

## 2022-03-21 ENCOUNTER — OFFICE VISIT (OUTPATIENT)
Dept: FAMILY MEDICINE CLINIC | Facility: CLINIC | Age: 57
End: 2022-03-21
Payer: MEDICARE

## 2022-03-21 VITALS
SYSTOLIC BLOOD PRESSURE: 110 MMHG | OXYGEN SATURATION: 100 % | HEART RATE: 66 BPM | TEMPERATURE: 97.5 F | DIASTOLIC BLOOD PRESSURE: 80 MMHG

## 2022-03-21 DIAGNOSIS — I48.91 ATRIAL FIBRILLATION, UNSPECIFIED TYPE (HCC): ICD-10-CM

## 2022-03-21 DIAGNOSIS — E11.9 TYPE 2 DIABETES MELLITUS TREATED WITH INSULIN (HCC): ICD-10-CM

## 2022-03-21 DIAGNOSIS — Z93.0 TRACHEOSTOMY IN PLACE (HCC): ICD-10-CM

## 2022-03-21 DIAGNOSIS — I50.22 CHRONIC SYSTOLIC HEART FAILURE (HCC): ICD-10-CM

## 2022-03-21 DIAGNOSIS — I25.10 CORONARY ARTERY DISEASE INVOLVING NATIVE CORONARY ARTERY OF NATIVE HEART WITHOUT ANGINA PECTORIS: Primary | ICD-10-CM

## 2022-03-21 DIAGNOSIS — F41.9 INSOMNIA SECONDARY TO ANXIETY: ICD-10-CM

## 2022-03-21 DIAGNOSIS — F51.05 INSOMNIA SECONDARY TO ANXIETY: ICD-10-CM

## 2022-03-21 DIAGNOSIS — Z79.4 TYPE 2 DIABETES MELLITUS TREATED WITH INSULIN (HCC): ICD-10-CM

## 2022-03-21 PROCEDURE — 99349 HOME/RES VST EST MOD MDM 40: CPT | Performed by: FAMILY MEDICINE

## 2022-03-21 RX ORDER — SPIRONOLACTONE 100 MG/1
100 TABLET, FILM COATED ORAL DAILY
Qty: 90 TABLET | Refills: 0 | Status: SHIPPED | OUTPATIENT
Start: 2022-03-21 | End: 2022-06-20

## 2022-03-21 RX ORDER — FUROSEMIDE 20 MG/1
20 TABLET ORAL DAILY
Qty: 90 TABLET | Refills: 1 | Status: SHIPPED | OUTPATIENT
Start: 2022-03-21 | End: 2022-03-28 | Stop reason: HOSPADM

## 2022-03-21 RX ORDER — AMIODARONE HYDROCHLORIDE 100 MG/1
100 TABLET ORAL
Qty: 90 TABLET | Refills: 0 | Status: SHIPPED | OUTPATIENT
Start: 2022-03-21 | End: 2022-06-20

## 2022-03-21 RX ORDER — INSULIN GLULISINE 100 [IU]/ML
2 INJECTION, SOLUTION SUBCUTANEOUS
Qty: 3 ML | Refills: 1 | Status: SHIPPED | OUTPATIENT
Start: 2022-03-21 | End: 2022-03-22

## 2022-03-21 RX ORDER — INSULIN GLARGINE 100 [IU]/ML
8 INJECTION, SOLUTION SUBCUTANEOUS DAILY
Qty: 3 ML | Refills: 2 | Status: SHIPPED | OUTPATIENT
Start: 2022-03-21 | End: 2022-03-22

## 2022-03-21 RX ORDER — SPIRONOLACTONE 100 MG/1
100 TABLET, FILM COATED ORAL DAILY
COMMUNITY
Start: 2022-02-11 | End: 2022-03-21 | Stop reason: SDUPTHER

## 2022-03-21 RX ORDER — TRAZODONE HYDROCHLORIDE 50 MG/1
TABLET ORAL
Status: ON HOLD | COMMUNITY
Start: 2022-02-10

## 2022-03-21 RX ORDER — LOSARTAN POTASSIUM 100 MG/1
100 TABLET ORAL DAILY
COMMUNITY
Start: 2022-02-10 | End: 2022-03-21

## 2022-03-21 RX ORDER — ATORVASTATIN CALCIUM 40 MG/1
40 TABLET, FILM COATED ORAL DAILY
Qty: 90 TABLET | Refills: 0 | Status: ON HOLD | OUTPATIENT
Start: 2022-03-21 | End: 2022-08-09

## 2022-03-21 RX ORDER — LOSARTAN POTASSIUM 50 MG/1
50 TABLET ORAL DAILY
Qty: 90 TABLET | Refills: 0 | Status: SHIPPED | OUTPATIENT
Start: 2022-03-21 | End: 2022-06-06 | Stop reason: SDUPTHER

## 2022-03-21 RX ORDER — INSULIN ASPART 100 [IU]/ML
INJECTION, SOLUTION INTRAVENOUS; SUBCUTANEOUS
COMMUNITY
Start: 2022-02-10 | End: 2022-03-21 | Stop reason: ALTCHOICE

## 2022-03-21 NOTE — ASSESSMENT & PLAN NOTE
· Currently on 10L/min at all times except for during albuterol neb treatments  · Lungs CTABL today and SpO2 100%   · Pulmonology outpatient referral given previously, patient instructed to call ASAP for an appointment to establish care  · Suspected COPD and needs evaluation  · Also will need to discuss whether tracheostomy will be necessary long-term for patient

## 2022-03-21 NOTE — ASSESSMENT & PLAN NOTE
· s/p STEMI of LCx 10/2021 w/ PATRICA in place, on Brilinta  · Refilled meds today for patient  · Cardiology/EP referral given previously and patient instructed to call ASAP for an outpatient appointment  · Patient has LifeVest in place and will need likely ICD placement given high risk of recurrent ventricular arrhythmias related to scarring from MI  · Discussed adherence to meds, optimization of BP, diabetes/HLD control to prevent recurrent CAD

## 2022-03-21 NOTE — ASSESSMENT & PLAN NOTE
· 10/2021 episode of AFib with RVR s/p synchronized cardioversion with return to NSR  · RRR on exam today with unchanged murmur  · Amio reduced to 100 mg today as noted by Cardiology  · Continue eliquis, refilled today

## 2022-03-21 NOTE — ASSESSMENT & PLAN NOTE
Lab Results   Component Value Date    HGBA1C 6 6 (H) 01/01/2022   · Due for repeat HbA1C in a few weeks, will order with next labs after 4/1/22  · Refilled insulin today

## 2022-03-21 NOTE — ASSESSMENT & PLAN NOTE
· Patient recently started on lexapro last week, taking ativan for breakthrough anxiety episodes while awaiting SSRI to take effect  · Patient acknowledges her chronic health issues and her breathing concerns are the main triggers for anxiety  · She is amenable to consider referral for therapy in the future  · Continue lexapro and ativan at this time; patient aware that ativan is a preferred long-term solution for anxiety  · Was on seroquel in the past for sleep, and more recently trazodone PRN HS; however, in the setting of arrhythmias (VT episodes and Afib s/p synchronized cardioversion) with LifeVest in place and anticipated ICD placement, favor avoidance of medications with known conduction effects/QTc prolongation, including seroquel, trazodone, or hydroxyzine, at least until Cardiology evaluation  · Suspect patient's insomnia is primarily related to anxiety and that improvement of anxiety will aid in sleep quality  · Of note, if SSRI alone is insufficient, would plan to consider adding buspar and/or Psychiatry evaluation for patient  · Continue melatonin QHS

## 2022-03-21 NOTE — ASSESSMENT & PLAN NOTE
Wt Readings from Last 3 Encounters:   03/03/22 85 2 kg (187 lb 13 3 oz)   11/23/21 87 5 kg (192 lb 14 4 oz)   11/04/21 91 3 kg (201 lb 4 5 oz)     · Last echo 2/24/22 showing EF approx  50% with trace MR and moderate LVH; (+) RWMA involving inferior and basal anterolateral walls 2/2 old lateral infarction     · Currently taking 40 mg lasix after discharge from ED 3/8/22 due to increased LE edema  · As LE edema only 1+ pitting edema today with likely a contribution of dependent edema givenshe's been sitting in chair and not properly elevating her legs, will reduce lasix back to maintenance dose today, 20 mg QD  · Will call in 1 week to check on patient and order bloodwork to check renal function and electrolytes  · Cardilology referral given and appointment pending

## 2022-03-21 NOTE — PROGRESS NOTES
HOME VISIT    Assessment/Plan:     Problem List Items Addressed This Visit        Endocrine    Type 2 diabetes mellitus treated with insulin ([facility] [address] )       Lab Results   Component Value Date    HGBA1C 6 6 (H) 01/01/2022 ·   Due for repeat HbA1C in a few weeks, will order with next labs after 4/1/22  · Refilled insulin today         Relevant Medications    atorvastatin (LIPITOR) 40 mg tablet    insulin glargine (Basaglar KwikPen) 100 units/mL injection pen    insulin glulisine (Apidra SoloStar) 100 units/mL injection pen       Cardiovascular and Mediastinum    A-fib ([facility] [address] )     · 10/2021 episode of AFib with RVR s/p synchronized cardioversion with return to NSR  · RRR on exam today with unchanged murmur  · Amio reduced to 100 mg today as noted by Cardiology  · Continue eliquis, refilled today         Relevant Medications    amiodarone 100 mg tablet    apixaban (ELIQUIS) 5 mg    metoprolol tartrate (LOPRESSOR) 25 mg tablet    ticagrelor (BRILINTA) 90 MG    CAD (coronary artery disease) - Primary     · s/p STEMI of LCx 10/2021 w/ PATRICA in place, on Brilinta  · Refilled meds today for patient  · Cardiology/EP referral given previously and patient instructed to call ASAP for an outpatient appointment  · Patient has LifeVest in place and will need likely ICD placement given high risk of recurrent ventricular arrhythmias related to scarring from MI  · Discussed adherence to meds, optimization of BP, diabetes/HLD control to prevent recurrent CAD         Relevant Medications    metoprolol tartrate (LOPRESSOR) 25 mg tablet    spironolactone (ALDACTONE) 100 mg tablet    ticagrelor (BRILINTA) 90 MG    Chronic systolic heart failure (HCC)     Wt Readings from Last 3 Encounters:   03/03/22 85 2 kg (187 lb 13 3 oz)   11/23/21 87 5 kg (192 lb 14 4 oz)   11/04/21 91 3 kg (201 lb 4 5 oz)     · Last echo 2/24/22 showing EF approx   50% with trace MR and moderate LVH; (+) RWMA involving inferior and basal anterolateral walls 2/2 old lateral infarction     · Currently taking 40 mg lasix after discharge from ED 3/8/22 due to increased LE edema  · As LE edema only 1+ pitting edema today with likely a contribution of dependent edema givenshe's been sitting in chair and not properly elevating her legs, will reduce lasix back to maintenance dose today, 20 mg QD  · Will call in 1 week to check on patient and order bloodwork to check renal function and electrolytes  · Cardiology referral given and appointment pending         Relevant Medications    furosemide (LASIX) 20 mg tablet    losartan (COZAAR) 50 mg tablet    metoprolol tartrate (LOPRESSOR) 25 mg tablet    spironolactone (ALDACTONE) 100 mg tablet    ticagrelor (BRILINTA) 90 MG       Other    Tracheostomy in place (HCC)     · Currently on 10L/min at all times except for during albuterol neb treatments  · Lungs CTABL today and SpO2 100%   · Pulmonology outpatient referral given previously, patient instructed to call ASAP for an appointment to establish care  · Suspected COPD and needs evaluation  · Also will need to discuss whether tracheostomy will be necessary long-term for patient         Insomnia secondary to anxiety     · Patient recently started on lexapro last week, taking ativan for breakthrough anxiety episodes while awaiting SSRI to take effect  · Patient acknowledges her chronic health issues and her breathing concerns are the main triggers for anxiety  · She is amenable to consider referral for therapy in the future  · Continue lexapro and ativan at this time; patient aware that ativan is a preferred long-term solution for anxiety  · Was on seroquel in the past for sleep, and more recently trazodone PRN HS; however, in the setting of arrhythmias (VT episodes and Afib s/p synchronized cardioversion) with LifeVest in place and anticipated ICD placement, favor avoidance of medications with known conduction effects/QTc prolongation, including seroquel, trazodone, or hydroxyzine, at least until Cardiology evaluation  · Suspect patient's insomnia is primarily related to anxiety and that improvement of anxiety will aid in sleep quality  · Of note, if SSRI alone is insufficient, would plan to consider adding buspar and/or Psychiatry evaluation for patient  · Continue melatonin QHS                 Subjective:      Patient ID: Michelle Roa is a 55 y o  female  HPI  54 yo F with hx of CAD s/p PCI, HFpEF, HTN, HLD, tobacco abuse with suspected COPD, CHANTALE, T2DM, thoracic ascending aortic dilatation (4 3cm), anxiety with depression and fibromyalgia is seen today via home visit  Please see my note from 3/14/22 for extensive summary of patient's medical history and clinical course in Fall 2021  Patient is present in her home today with her son, Sue Bañuelos, and his girlfriend, Dionisio, who both provide care for patient in addition to her nephew, Nick Singh, who is not present at home visit today  Patient needs refills on most medications as she was only given 14 day supply from last discharge in early March  Needs refills on aldactone, losartan, lasix (had been taking 40 mg QD but needs to decrease to 20 mg QD, which is her maintenance dose per Cardiology), metoprolol, brilinta, insulin, lipitor, eliquis  No refill needed on Lyrica until July  Amio was to be decreased to 100 mg from 200 mg after 2 weeks, which patient has completed and she now needs 100 mg Rx sent to pharmacy  Hx of AFib with RVR (s/p synchronized cardioversion 10/30/21 with return to sinus rhythm), taking eliquis BID  Denies arrhythmia episodes and has LifeVest in place at time of visit  Patient denies palpitations or chest pain, taking medications as prescribed since discharge  Was given referrals for Cardiology/EP and Pulmonology at telemedicine visit last week but son and patient report they have not yet scheduled either appointment   Using albuterol 2-3 times per day for SOB; patient has suspected COPD (+ emphysematous changes on pulmonary imaging in the setting of tobacco abuse) but has not had PFTs or Pulmonary outpatient evaluation  Trach collar in place and patient using 10 LPM at all times except during neb treatments  Has 5 portable O2 tanks which last 30 minutes each  She is working with PT and OT and trying to walk around as much as she can with the walker to improve conditioning  Patient was taking 25 mg trazodone PRN HS for sleep, requests refill or to restart seroquel, which she was taking prior to her MI  She was recently started on lexapro at her telemedicine visit and is taking ativan BID PRN for anxiety, which patient reports she is experiencing on a frequent basis  When told it is understandable to have anxiety considering what she went through, she replies "I don't even really know everything that I went through"  Reviewed in detail with patient today her clinical course following her MI including her 2 cardiac arrests related to arrhythmias 2/2 her LCx STEMI, multiple trips to cath lab and PATRICA placement x1, intubation and subsequent trach/PEG placement (PEG has since been removed), MRSA pneumonia and multifocal CVAs experienced  Patient is appreciative of the time spent reviewing her clinical course with her  Patient has been sleeping on the chair in the living room and plans to have her son help her upstairs tonight so she can sleep in her bed, as they have the portable O2 tank and patient can elevate her legs and has compression stockings upstairs as well  She reports she is nervous about needing ambulance transport to go to her doctor's appointments, as her portable O2 tank only lasts 30 mins  However, she acknowledges the need for specialist follow ups  She needs labs in a few weeks for check HbA1C, renal function and electrolytes, needs iron studies for chronic microcytic anemia w/ last Hb 8 8 3/8/22       The following portions of the patient's history were reviewed and updated as appropriate: allergies, current medications, past family history, past medical history, past social history, past surgical history and problem list     Review of Systems   Constitutional: Positive for fatigue  Negative for chills and fever  Respiratory: Positive for shortness of breath  Negative for chest tightness  Cardiovascular: Positive for leg swelling  Negative for chest pain and palpitations  Gastrointestinal: Negative for abdominal pain, diarrhea, nausea and vomiting  Genitourinary: Negative for dysuria  Musculoskeletal: Positive for back pain, gait problem and myalgias  Fibromyalgia on Lyrica; using walker 2/2 deconditioning   Neurological: Negative for syncope and light-headedness  Some subjective R hand weakness s/p CVA   Psychiatric/Behavioral: Positive for sleep disturbance  Negative for agitation and suicidal ideas  The patient is nervous/anxious  Objective:    /80 (BP Location: Left arm)   Pulse 66   Temp 97 5 °F (36 4 °C) (Tympanic)   SpO2 100%        Physical Exam  Vitals reviewed  Constitutional:       General: She is not in acute distress  HENT:      Head: Normocephalic and atraumatic  Cardiovascular:      Rate and Rhythm: Normal rate and regular rhythm  Heart sounds: Murmur heard  Comments: LifeVest in place  Pulmonary:      Comments: Trach collar in place, tracheostomy O2 @10LPM  Abdominal:      Tenderness: There is no abdominal tenderness  Comments: Well-healed incisional cicatrix @ site of previous PEG     Musculoskeletal:      Comments: 1+ pitting LE edema B/L   Skin:     Capillary Refill: Capillary refill takes less than 2 seconds  Neurological:      Mental Status: She is alert  Mental status is at baseline     Psychiatric:      Comments: Intermittently tearful while discussing anxiety and reviewing patient's heart attack and cardiopulmonary complications

## 2022-03-22 RX ORDER — INSULIN GLULISINE 100 [IU]/ML
INJECTION, SOLUTION SUBCUTANEOUS
Qty: 15 ML | Refills: 1 | Status: SHIPPED | OUTPATIENT
Start: 2022-03-22 | End: 2022-03-28 | Stop reason: HOSPADM

## 2022-03-22 RX ORDER — INSULIN GLARGINE 100 [IU]/ML
8 INJECTION, SOLUTION SUBCUTANEOUS DAILY
Qty: 15 ML | Refills: 2 | Status: ON HOLD | OUTPATIENT
Start: 2022-03-22 | End: 2022-05-23 | Stop reason: SDUPTHER

## 2022-03-23 RX ORDER — INSULIN ASPART 100 [IU]/ML
2 INJECTION, SOLUTION INTRAVENOUS; SUBCUTANEOUS
Qty: 15 ML | Refills: 1 | Status: ON HOLD | OUTPATIENT
Start: 2022-03-23 | End: 2022-06-29 | Stop reason: SDUPTHER

## 2022-03-23 RX ORDER — PEN NEEDLE, DIABETIC 32 GX 1/4"
NEEDLE, DISPOSABLE MISCELLANEOUS
Qty: 100 EACH | Refills: 2 | Status: SHIPPED | OUTPATIENT
Start: 2022-03-23 | End: 2022-07-05

## 2022-03-23 NOTE — TELEPHONE ENCOUNTER
Apidra solostar is not covered by patients insurance  Please send in new script with one of the following medications       Novolog 100 unit/ml flexpen  Novolog 100 unit/ml vial   Novolog penfill 100 unit/ml

## 2022-03-25 ENCOUNTER — TELEPHONE (OUTPATIENT)
Dept: INPATIENT UNIT | Facility: HOSPITAL | Age: 57
End: 2022-03-25

## 2022-03-25 ENCOUNTER — APPOINTMENT (EMERGENCY)
Dept: RADIOLOGY | Facility: HOSPITAL | Age: 57
DRG: 291 | End: 2022-03-25
Payer: MEDICARE

## 2022-03-25 ENCOUNTER — HOSPITAL ENCOUNTER (INPATIENT)
Facility: HOSPITAL | Age: 57
LOS: 2 days | Discharge: HOME HEALTH | DRG: 291 | End: 2022-03-28
Attending: EMERGENCY MEDICINE | Admitting: INTERNAL MEDICINE
Payer: MEDICARE

## 2022-03-25 DIAGNOSIS — D50.9 MICROCYTIC ANEMIA: ICD-10-CM

## 2022-03-25 DIAGNOSIS — I50.9 CHF EXACERBATION (HCC): ICD-10-CM

## 2022-03-25 DIAGNOSIS — Z93.0 TRACHEOSTOMY IN PLACE (HCC): ICD-10-CM

## 2022-03-25 DIAGNOSIS — E11.9 TYPE 2 DIABETES MELLITUS TREATED WITH INSULIN (HCC): ICD-10-CM

## 2022-03-25 DIAGNOSIS — E78.5 HYPERLIPIDEMIA ASSOCIATED WITH TYPE 2 DIABETES MELLITUS (HCC): Chronic | ICD-10-CM

## 2022-03-25 DIAGNOSIS — R79.89 ELEVATED BRAIN NATRIURETIC PEPTIDE (BNP) LEVEL: ICD-10-CM

## 2022-03-25 DIAGNOSIS — Z79.4 TYPE 2 DIABETES MELLITUS TREATED WITH INSULIN (HCC): ICD-10-CM

## 2022-03-25 DIAGNOSIS — E11.69 HYPERLIPIDEMIA ASSOCIATED WITH TYPE 2 DIABETES MELLITUS (HCC): Chronic | ICD-10-CM

## 2022-03-25 DIAGNOSIS — I50.43 ACUTE ON CHRONIC COMBINED SYSTOLIC AND DIASTOLIC HEART FAILURE (HCC): ICD-10-CM

## 2022-03-25 DIAGNOSIS — I50.22 CHRONIC SYSTOLIC HEART FAILURE (HCC): ICD-10-CM

## 2022-03-25 DIAGNOSIS — R06.02 SOB (SHORTNESS OF BREATH): Primary | ICD-10-CM

## 2022-03-25 DIAGNOSIS — J96.11 CHRONIC HYPOXEMIC RESPIRATORY FAILURE (HCC): ICD-10-CM

## 2022-03-25 PROBLEM — R77.8 ELEVATED TROPONIN: Status: ACTIVE | Noted: 2022-03-25

## 2022-03-25 LAB
2HR DELTA HS TROPONIN: 1 NG/L
4HR DELTA HS TROPONIN: 1 NG/L
ALBUMIN SERPL BCP-MCNC: 3.6 G/DL (ref 3.5–5)
ALP SERPL-CCNC: 82 U/L (ref 34–104)
ALT SERPL W P-5'-P-CCNC: 10 U/L (ref 7–52)
ANION GAP SERPL CALCULATED.3IONS-SCNC: 10 MMOL/L (ref 4–13)
AST SERPL W P-5'-P-CCNC: 15 U/L (ref 13–39)
BASOPHILS # BLD AUTO: 0.02 THOUSANDS/ΜL (ref 0–0.1)
BASOPHILS NFR BLD AUTO: 0 % (ref 0–1)
BILIRUB SERPL-MCNC: 1 MG/DL (ref 0.2–1)
BNP SERPL-MCNC: 606 PG/ML (ref 1–100)
BUN SERPL-MCNC: 14 MG/DL (ref 5–25)
CALCIUM SERPL-MCNC: 9 MG/DL (ref 8.4–10.2)
CARDIAC TROPONIN I PNL SERPL HS: 28 NG/L
CARDIAC TROPONIN I PNL SERPL HS: 29 NG/L
CARDIAC TROPONIN I PNL SERPL HS: 29 NG/L
CHLORIDE SERPL-SCNC: 105 MMOL/L (ref 96–108)
CO2 SERPL-SCNC: 25 MMOL/L (ref 21–32)
CREAT SERPL-MCNC: 0.83 MG/DL (ref 0.6–1.3)
EOSINOPHIL # BLD AUTO: 0.1 THOUSAND/ΜL (ref 0–0.61)
EOSINOPHIL NFR BLD AUTO: 1 % (ref 0–6)
ERYTHROCYTE [DISTWIDTH] IN BLOOD BY AUTOMATED COUNT: 17.9 % (ref 11.6–15.1)
GFR SERPL CREATININE-BSD FRML MDRD: 79 ML/MIN/1.73SQ M
GLUCOSE SERPL-MCNC: 227 MG/DL (ref 65–140)
GLUCOSE SERPL-MCNC: 244 MG/DL (ref 65–140)
HCT VFR BLD AUTO: 30.3 % (ref 34.8–46.1)
HGB BLD-MCNC: 9.4 G/DL (ref 11.5–15.4)
IMM GRANULOCYTES # BLD AUTO: 0.02 THOUSAND/UL (ref 0–0.2)
IMM GRANULOCYTES NFR BLD AUTO: 0 % (ref 0–2)
LYMPHOCYTES # BLD AUTO: 1.19 THOUSANDS/ΜL (ref 0.6–4.47)
LYMPHOCYTES NFR BLD AUTO: 13 % (ref 14–44)
MCH RBC QN AUTO: 25.1 PG (ref 26.8–34.3)
MCHC RBC AUTO-ENTMCNC: 31 G/DL (ref 31.4–37.4)
MCV RBC AUTO: 81 FL (ref 82–98)
MONOCYTES # BLD AUTO: 0.63 THOUSAND/ΜL (ref 0.17–1.22)
MONOCYTES NFR BLD AUTO: 7 % (ref 4–12)
NEUTROPHILS # BLD AUTO: 7.29 THOUSANDS/ΜL (ref 1.85–7.62)
NEUTS SEG NFR BLD AUTO: 79 % (ref 43–75)
NRBC BLD AUTO-RTO: 0 /100 WBCS
PLATELET # BLD AUTO: 359 THOUSANDS/UL (ref 149–390)
PMV BLD AUTO: 11.5 FL (ref 8.9–12.7)
POTASSIUM SERPL-SCNC: 3.6 MMOL/L (ref 3.5–5.3)
PROT SERPL-MCNC: 7.1 G/DL (ref 6.4–8.4)
RBC # BLD AUTO: 3.74 MILLION/UL (ref 3.81–5.12)
SODIUM SERPL-SCNC: 140 MMOL/L (ref 135–147)
WBC # BLD AUTO: 9.25 THOUSAND/UL (ref 4.31–10.16)

## 2022-03-25 PROCEDURE — 94760 N-INVAS EAR/PLS OXIMETRY 1: CPT

## 2022-03-25 PROCEDURE — 99285 EMERGENCY DEPT VISIT HI MDM: CPT

## 2022-03-25 PROCEDURE — 85025 COMPLETE CBC W/AUTO DIFF WBC: CPT | Performed by: EMERGENCY MEDICINE

## 2022-03-25 PROCEDURE — 82948 REAGENT STRIP/BLOOD GLUCOSE: CPT

## 2022-03-25 PROCEDURE — 94664 DEMO&/EVAL PT USE INHALER: CPT

## 2022-03-25 PROCEDURE — 71045 X-RAY EXAM CHEST 1 VIEW: CPT

## 2022-03-25 PROCEDURE — 80053 COMPREHEN METABOLIC PANEL: CPT | Performed by: EMERGENCY MEDICINE

## 2022-03-25 PROCEDURE — 94640 AIRWAY INHALATION TREATMENT: CPT

## 2022-03-25 PROCEDURE — 83880 ASSAY OF NATRIURETIC PEPTIDE: CPT | Performed by: EMERGENCY MEDICINE

## 2022-03-25 PROCEDURE — 84484 ASSAY OF TROPONIN QUANT: CPT | Performed by: EMERGENCY MEDICINE

## 2022-03-25 PROCEDURE — 36415 COLL VENOUS BLD VENIPUNCTURE: CPT | Performed by: EMERGENCY MEDICINE

## 2022-03-25 PROCEDURE — 99220 PR INITIAL OBSERVATION CARE/DAY 70 MINUTES: CPT

## 2022-03-25 PROCEDURE — 93005 ELECTROCARDIOGRAM TRACING: CPT

## 2022-03-25 PROCEDURE — 99285 EMERGENCY DEPT VISIT HI MDM: CPT | Performed by: EMERGENCY MEDICINE

## 2022-03-25 PROCEDURE — 96374 THER/PROPH/DIAG INJ IV PUSH: CPT

## 2022-03-25 RX ORDER — FUROSEMIDE 10 MG/ML
20 INJECTION INTRAMUSCULAR; INTRAVENOUS ONCE
Status: COMPLETED | OUTPATIENT
Start: 2022-03-25 | End: 2022-03-25

## 2022-03-25 RX ORDER — AMIODARONE HYDROCHLORIDE 200 MG/1
100 TABLET ORAL
Status: DISCONTINUED | OUTPATIENT
Start: 2022-03-26 | End: 2022-03-28 | Stop reason: HOSPADM

## 2022-03-25 RX ORDER — LEVALBUTEROL INHALATION SOLUTION 1.25 MG/3ML
1.25 SOLUTION RESPIRATORY (INHALATION)
Status: DISCONTINUED | OUTPATIENT
Start: 2022-03-25 | End: 2022-03-28 | Stop reason: HOSPADM

## 2022-03-25 RX ORDER — PREGABALIN 75 MG/1
75 CAPSULE ORAL DAILY
Status: DISCONTINUED | OUTPATIENT
Start: 2022-03-26 | End: 2022-03-28 | Stop reason: HOSPADM

## 2022-03-25 RX ORDER — POLYETHYLENE GLYCOL 3350 17 G/17G
17 POWDER, FOR SOLUTION ORAL DAILY
Status: DISCONTINUED | OUTPATIENT
Start: 2022-03-26 | End: 2022-03-28 | Stop reason: HOSPADM

## 2022-03-25 RX ORDER — FAMOTIDINE 20 MG/1
20 TABLET, FILM COATED ORAL 2 TIMES DAILY
Status: DISCONTINUED | OUTPATIENT
Start: 2022-03-26 | End: 2022-03-28 | Stop reason: HOSPADM

## 2022-03-25 RX ORDER — FAMOTIDINE 40 MG/5ML
20 POWDER, FOR SUSPENSION ORAL 2 TIMES DAILY
Status: DISCONTINUED | OUTPATIENT
Start: 2022-03-25 | End: 2022-03-25

## 2022-03-25 RX ORDER — INSULIN GLARGINE 100 [IU]/ML
8 INJECTION, SOLUTION SUBCUTANEOUS
Status: DISCONTINUED | OUTPATIENT
Start: 2022-03-25 | End: 2022-03-28 | Stop reason: HOSPADM

## 2022-03-25 RX ORDER — LANOLIN ALCOHOL/MO/W.PET/CERES
6 CREAM (GRAM) TOPICAL
Status: DISCONTINUED | OUTPATIENT
Start: 2022-03-25 | End: 2022-03-28 | Stop reason: HOSPADM

## 2022-03-25 RX ORDER — AMOXICILLIN 250 MG
1 CAPSULE ORAL
Status: DISCONTINUED | OUTPATIENT
Start: 2022-03-25 | End: 2022-03-28 | Stop reason: HOSPADM

## 2022-03-25 RX ORDER — ATORVASTATIN CALCIUM 40 MG/1
40 TABLET, FILM COATED ORAL DAILY
Status: DISCONTINUED | OUTPATIENT
Start: 2022-03-26 | End: 2022-03-28 | Stop reason: HOSPADM

## 2022-03-25 RX ORDER — LOSARTAN POTASSIUM 50 MG/1
50 TABLET ORAL DAILY
Status: DISCONTINUED | OUTPATIENT
Start: 2022-03-26 | End: 2022-03-28 | Stop reason: HOSPADM

## 2022-03-25 RX ORDER — ALBUTEROL SULFATE 2.5 MG/3ML
2.5 SOLUTION RESPIRATORY (INHALATION) EVERY 4 HOURS PRN
Status: DISCONTINUED | OUTPATIENT
Start: 2022-03-25 | End: 2022-03-28 | Stop reason: HOSPADM

## 2022-03-25 RX ORDER — FUROSEMIDE 10 MG/ML
20 INJECTION INTRAMUSCULAR; INTRAVENOUS
Status: DISCONTINUED | OUTPATIENT
Start: 2022-03-26 | End: 2022-03-28

## 2022-03-25 RX ORDER — SPIRONOLACTONE 25 MG/1
100 TABLET ORAL DAILY
Status: DISCONTINUED | OUTPATIENT
Start: 2022-03-26 | End: 2022-03-28 | Stop reason: HOSPADM

## 2022-03-25 RX ORDER — LEVALBUTEROL INHALATION SOLUTION 1.25 MG/3ML
SOLUTION RESPIRATORY (INHALATION)
Status: COMPLETED
Start: 2022-03-25 | End: 2022-03-25

## 2022-03-25 RX ORDER — FUROSEMIDE 10 MG/ML
25 INJECTION INTRAMUSCULAR; INTRAVENOUS
Status: DISCONTINUED | OUTPATIENT
Start: 2022-03-26 | End: 2022-03-25

## 2022-03-25 RX ORDER — DIAZEPAM 5 MG/ML
2.5 INJECTION, SOLUTION INTRAMUSCULAR; INTRAVENOUS ONCE
Status: COMPLETED | OUTPATIENT
Start: 2022-03-25 | End: 2022-03-25

## 2022-03-25 RX ORDER — ACETAMINOPHEN 325 MG/1
650 TABLET ORAL EVERY 4 HOURS PRN
Status: DISCONTINUED | OUTPATIENT
Start: 2022-03-25 | End: 2022-03-28 | Stop reason: HOSPADM

## 2022-03-25 RX ORDER — FUROSEMIDE 20 MG/1
20 TABLET ORAL ONCE
Status: COMPLETED | OUTPATIENT
Start: 2022-03-25 | End: 2022-03-25

## 2022-03-25 RX ORDER — LORAZEPAM 0.5 MG/1
0.5 TABLET ORAL 2 TIMES DAILY PRN
Status: DISCONTINUED | OUTPATIENT
Start: 2022-03-25 | End: 2022-03-28 | Stop reason: HOSPADM

## 2022-03-25 RX ORDER — ESCITALOPRAM OXALATE 10 MG/1
10 TABLET ORAL DAILY
Status: DISCONTINUED | OUTPATIENT
Start: 2022-03-26 | End: 2022-03-28 | Stop reason: HOSPADM

## 2022-03-25 RX ORDER — TRAZODONE HYDROCHLORIDE 50 MG/1
50 TABLET ORAL
Status: DISCONTINUED | OUTPATIENT
Start: 2022-03-25 | End: 2022-03-28 | Stop reason: HOSPADM

## 2022-03-25 RX ADMIN — METOPROLOL TARTRATE 25 MG: 25 TABLET, FILM COATED ORAL at 23:09

## 2022-03-25 RX ADMIN — Medication 6 MG: at 23:09

## 2022-03-25 RX ADMIN — LEVALBUTEROL HYDROCHLORIDE 1.25 MG: 1.25 SOLUTION RESPIRATORY (INHALATION) at 21:37

## 2022-03-25 RX ADMIN — INSULIN LISPRO 2 UNITS: 100 INJECTION, SOLUTION INTRAVENOUS; SUBCUTANEOUS at 23:15

## 2022-03-25 RX ADMIN — TICAGRELOR 90 MG: 90 TABLET ORAL at 23:10

## 2022-03-25 RX ADMIN — TRAZODONE HYDROCHLORIDE 50 MG: 50 TABLET ORAL at 23:09

## 2022-03-25 RX ADMIN — FUROSEMIDE 20 MG: 20 TABLET ORAL at 23:16

## 2022-03-25 RX ADMIN — INSULIN GLARGINE 8 UNITS: 100 INJECTION, SOLUTION SUBCUTANEOUS at 23:15

## 2022-03-25 RX ADMIN — APIXABAN 5 MG: 5 TABLET, FILM COATED ORAL at 23:09

## 2022-03-25 RX ADMIN — DIAZEPAM 2.5 MG: 10 INJECTION, SOLUTION INTRAMUSCULAR; INTRAVENOUS at 19:07

## 2022-03-25 RX ADMIN — SENNOSIDES AND DOCUSATE SODIUM 1 TABLET: 50; 8.6 TABLET ORAL at 23:09

## 2022-03-25 RX ADMIN — FUROSEMIDE 20 MG: 10 INJECTION, SOLUTION INTRAMUSCULAR; INTRAVENOUS at 20:25

## 2022-03-25 RX ADMIN — IPRATROPIUM BROMIDE 0.5 MG: 0.5 SOLUTION RESPIRATORY (INHALATION) at 21:37

## 2022-03-25 NOTE — ED PROVIDER NOTES
History  Chief Complaint   Patient presents with    Tracheostomy Tube Evaluation     Pt to ER for eval of SOB  Per EMS pt has been sick and was having trouble breathing today  Son suctioned her today and pulled out a clot  HPI    56 yo F hx of aortic aneurysm, depression DM, GERD, HTN HLD, afib on eliquis, STEMI 10/2021, CHF EF 50%, trach dependent patient on 10 L at baseline, recent admission for hypoxic respiratory failure requiring ICU stay thought to be from CHF exacerbation, presents to ed for eval of sob  Patient states she has had sob for the past few days  No productive cough  No fevers  Noticed increased swelling in her lower extremities, mild, but progressing  Presented to ER after her son noticed a clot when suctioning  Now has some relief, but still not back to baseline  Per EMS did find her in mild hypoxia, had her at 15 L up from baseline of 10  She denies any chest pain, is wearing her life vest  Is also compliant with her meds she states  No other complaints on ROS  Trach placed after her MI Nov last year   Prior to Admission Medications   Prescriptions Last Dose Informant Patient Reported? Taking?    Apidra SoloStar 100 units/mL injection pen   No No   Sig: INJECT 2 UNITS UNDER THE SKIN 3 TIMES DAILY WITH MEALS   Basaglar KwikPen 100 units/mL injection pen   No No   Sig: INJECT 8 UNITS UNDER THE SKIN DAILY   Blood Pressure Monitoring (Sphygmomanometer) MISC  Self No No   Sig: Use 2 (two) times a day   Patient not taking: Reported on 2021   Insulin Pen Needle (Novofine Pen Needle) 32G X 6 MM MISC   No No   Sig: Use 3 (three) times a day with meals   Insulin Pen Needle (UNIFINE PENTIPS PLUS) 31G X 6 MM MISC  Self Yes No   Si Device by Does not apply route 4 (four) times a day   LORazepam (Ativan) 1 mg tablet   No No   Sig: Take 0 5 tablets (0 5 mg total) by mouth 2 (two) times a day as needed for anxiety   Lancets MISC  Self Yes No   Si Device by Does not apply route 4 (four) times a day   Patient not taking: Reported on 2021   acetaminophen (TYLENOL) 160 mg/5 mL suspension   No No   Si 4 mL (975 mg total) by Per G Tube route every 6 (six) hours as needed for mild pain or fever   albuterol (2 5 mg/3 mL) 0 083 % nebulizer solution   No No   Sig: Take 3 mL (2 5 mg total) by nebulization every 4 (four) hours as needed for wheezing   amiodarone 100 mg tablet   No No   Sig: Take 1 tablet (100 mg total) by mouth daily with breakfast   apixaban (ELIQUIS) 5 mg   No No   Sig: Take 1 tablet (5 mg total) by mouth 2 (two) times a day   atorvastatin (LIPITOR) 40 mg tablet   No No   Sig: Take 1 tablet (40 mg total) by mouth daily   chlorhexidine (PERIDEX) 0 12 % solution   No No   Sig: Apply 15 mL to the mouth or throat every 12 (twelve) hours   escitalopram (Lexapro) 10 mg tablet   No No   Sig: Take 1 tablet (10 mg total) by mouth daily   famotidine (PEPCID) 20 mg/2 5 mL oral suspension   No No   Sig: Take 2 5 mL (20 mg total) by mouth 2 (two) times a day   furosemide (LASIX) 20 mg tablet   No No   Sig: Take 1 tablet (20 mg total) by mouth daily   insulin aspart (NovoLOG FlexPen) 100 UNIT/ML injection pen   No No   Sig: Inject 2 Units under the skin 3 (three) times a day with meals   ipratropium (ATROVENT) 0 02 % nebulizer solution   No No   Sig: Take 2 5 mL (0 5 mg total) by nebulization 3 (three) times a day   levalbuterol (XOPENEX) 1 25 mg/0 5 mL nebulizer solution   No No   Sig: Take 0 5 mL (1 25 mg total) by nebulization 3 (three) times a day   losartan (COZAAR) 50 mg tablet   No No   Sig: Take 1 tablet (50 mg total) by mouth daily   melatonin 3 mg   No No   Sig: Take 2 tablets (6 mg total) by mouth daily at bedtime   metoprolol tartrate (LOPRESSOR) 25 mg tablet   No No   Sig: Take 1 tablet (25 mg total) by mouth every 12 (twelve) hours   ondansetron (ZOFRAN) 4 mg/2 mL injection   No No   Sig: Infuse 2 mL (4 mg total) into a venous catheter every 6 (six) hours as needed for nausea or vomiting   polyethylene glycol (MIRALAX) 17 g packet   No No   Sig: Take 17 g by mouth daily   potassium chloride 10% oral solution   No No   Sig: Take 15 mL (20 mEq total) by mouth daily   pregabalin (LYRICA) 75 mg capsule  Self No No   Sig: TAKE ONE CAPSULE EVERY DAY   senna-docusate sodium (SENOKOT S) 8 6-50 mg per tablet   No No   Sig: Take 1 tablet by mouth daily at bedtime   spironolactone (ALDACTONE) 100 mg tablet   No No   Sig: Take 1 tablet (100 mg total) by mouth daily   ticagrelor (BRILINTA) 90 MG   No No   Sig: Take 1 tablet (90 mg total) by mouth every 12 (twelve) hours   traZODone (DESYREL) 50 mg tablet   Yes No   Sig: TAKE 0 5 TABLET (25MG) BY MOUTH NIGHTLY AS NEEDED FOR SLEEP  Facility-Administered Medications: None       Past Medical History:   Diagnosis Date    Anxiety     Aortic aneurysm (HCC)     Arthritis     Depression     Diabetes mellitus (HCC)     Fibromyalgia     GERD (gastroesophageal reflux disease)     GERD (gastroesophageal reflux disease)     H/O cardiovascular stress test 09/2018    no ischemia  EF 70%   H/O echocardiogram 01/2019    EF 60%  Mild LVH  trivial effusion   Hyperlipidemia     Hypertension     Migraines     Psychiatric disorder     anxiety    Uncontrolled hypertension 2/25/2015    Last Assessment & Plan:  BP today above goal <140/90, apparently asymptomatic  Prior BP elevations were attributed to not taking medication  Consider increased medication if persistent on f/u  Past Surgical History:   Procedure Laterality Date    BACK SURGERY      Lumbar epidural steroid injection    CARDIAC CATHETERIZATION N/A 10/22/2021    Procedure: Cardiac pci;  Surgeon: Layne Carmona MD;  [facility]; Service: Cardiology    CARDIAC CATHETERIZATION  10/22/2021    Procedure: Cardiac catheterization;  Surgeon: Layne Carmona MD;  [facility];   Service: Cardiology    CARDIAC CATHETERIZATION Left 10/27/2021 Procedure: Cardiac Left Heart Cath;  Surgeon: Dejuan Rod MD;  [facility]; Service: Cardiology    CARDIAC CATHETERIZATION N/A 10/27/2021    Procedure: Cardiac pci;  Surgeon: Dejuan Rod MD;  [facility]; Service: Cardiology    CARDIAC CATHETERIZATION N/A 10/28/2021    Procedure: Cardiac pci;  Surgeon: Paul Ruiz DO;  [facility]; Service: Cardiology    CARPAL TUNNEL RELEASE Left      SECTION      CHOLECYSTECTOMY      COLONOSCOPY      incomplete    COLONOSCOPY      EYE SURGERY      HYSTERECTOMY      Total    OVARIAN CYST REMOVAL      PEG W/TRACHEOSTOMY PLACEMENT N/A 2021    Procedure: TRACHEOSTOMY WITH INSERTION PEG TUBE;  Surgeon: Camilo Fung DO;  [facility]: [facility];  Service: General    TUBAL LIGATION      UPPER GASTROINTESTINAL ENDOSCOPY         Family History   Problem Relation Age of Onset    Hypertension Mother    Sheldon Flower Arthritis Mother     Diabetes Father     Other Sister         renal cell carcinoma    Diabetes Other     Factor V Leiden deficiency Other      I have reviewed and agree with the history as documented  E-Cigarette/Vaping    E-Cigarette Use Never User      E-Cigarette/Vaping Substances    Nicotine No     THC No     CBD No     Flavoring No     Other No     Unknown No      Social History     Tobacco Use    Smoking status: Former Smoker     Packs/day: 1 00     Years: 30 00     Pack years: 30 00     Types: Cigarettes    Smokeless tobacco: Never Used   Vaping Use    Vaping Use: Never used   Substance Use Topics    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   Drug use: Yes     Types: Marijuana     Comment: medical; seldom use       Review of Systems   Constitutional: Negative for chills, fatigue and fever  HENT: Negative for sore throat  Eyes: Negative for redness and visual disturbance  Respiratory: Positive for shortness of breath  Negative for cough  Cardiovascular: Negative for chest pain  Gastrointestinal: Negative for abdominal pain, diarrhea and nausea  Genitourinary: Negative for difficulty urinating, dysuria and pelvic pain  Musculoskeletal: Negative for back pain  Skin: Negative for rash  Neurological: Negative for syncope, weakness and headaches  All other systems reviewed and are negative  Physical Exam  Physical Exam  Vitals and nursing note reviewed  Constitutional:       General: She is not in acute distress  HENT:      Head: Normocephalic and atraumatic  Eyes:      Conjunctiva/sclera: Conjunctivae normal    Cardiovascular:      Rate and Rhythm: Normal rate and regular rhythm  Heart sounds: Normal heart sounds  Pulmonary:      Effort: Pulmonary effort is normal  No respiratory distress  Breath sounds: Normal breath sounds  Comments: Trach collar in place, on 10 L home level humidified oxygen, sating 99%  Abdominal:      General: Bowel sounds are normal       Palpations: Abdomen is soft  Tenderness: There is no abdominal tenderness  Musculoskeletal:         General: Normal range of motion  Cervical back: Normal range of motion  Right lower leg: Edema present  Left lower leg: Edema present  Comments: Right leg edema > L, compartments soft, no calf tenderness   Skin:     General: Skin is warm and dry  Findings: No rash  Neurological:      Mental Status: She is alert and oriented to person, place, and time  Cranial Nerves: No cranial nerve deficit  Sensory: No sensory deficit  Motor: No abnormal muscle tone        Coordination: Coordination normal          Vital Signs  ED Triage Vitals   Temperature Pulse Respirations Blood Pressure SpO2   03/25/22 1826 03/25/22 1826 03/25/22 1826 03/25/22 1855 03/25/22 1826   98 1 °F (36 7 °C) 62 20 166/85 99 %      Temp Source Heart Rate Source Patient Position - Orthostatic VS BP Location FiO2 (%)   03/25/22 1826 03/25/22 1826 -- -- 03/25/22 1859   Oral Monitor   30      Pain Score       --                  Vitals:    03/25/22 1826 03/25/22 1855   BP:  166/85   Pulse: 62          Visual Acuity      ED Medications  Medications   diazepam (VALIUM) injection 2 5 mg (2 5 mg Intravenous Given 3/25/22 1907)   furosemide (LASIX) injection 20 mg (20 mg Intravenous Given 3/25/22 2025)       Diagnostic Studies  Results Reviewed     Procedure Component Value Units Date/Time    HS Troponin I 4hr [[id number]]     Lab Status: No result Specimen: Blood     Comprehensive metabolic panel [[id number]]  (Abnormal) Collected: 03/25/22 1841    Lab Status: Final result Specimen: Blood from Arm, Right Updated: 03/25/22 1938     Sodium 140 mmol/L      Potassium 3 6 mmol/L      Chloride 105 mmol/L      CO2 25 mmol/L      ANION GAP 10 mmol/L      BUN 14 mg/dL      Creatinine 0 83 mg/dL      Glucose 244 mg/dL      Calcium 9 0 mg/dL      AST 15 U/L      ALT 10 U/L      Alkaline Phosphatase 82 U/L      Total Protein 7 1 g/dL      Albumin 3 6 g/dL      Total Bilirubin 1 00 mg/dL      eGFR 79 ml/min/1 73sq m     Narrative:      Jamin guidelines for Chronic Kidney Disease (CKD):     Stage 1 with normal or high GFR (GFR > 90 mL/min/1 73 square meters)    Stage 2 Mild CKD (GFR = 60-89 mL/min/1 73 square meters)    Stage 3A Moderate CKD (GFR = 45-59 mL/min/1 73 square meters)    Stage 3B Moderate CKD (GFR = 30-44 mL/min/1 73 square meters)    Stage 4 Severe CKD (GFR = 15-29 mL/min/1 73 square meters)    Stage 5 End Stage CKD (GFR <15 mL/min/1 73 square meters)  Note: GFR calculation is accurate only with a steady state creatinine    HS Troponin I 2hr [[id number]]     Lab Status: No result Specimen: Blood     HS Troponin 0hr (reflex protocol) [[id number]]  (Normal) Collected: 03/25/22 1841    Lab Status: Final result Specimen: Blood from Arm, Right Updated: 03/25/22 1916     hs TnI 0hr 28 ng/L     B-Type Natriuretic Peptide(BNP) CA, GH, EA [facility] Only [[id number]]  (Abnormal) Collected: 03/25/22 1841    Lab Status: Final result Specimen: Blood from Arm, Right Updated: 03/25/22 1915      pg/mL     CBC and differential [[id number]]  (Abnormal) Collected: 03/25/22 1841    Lab Status: Final result Specimen: Blood from Arm, Right Updated: 03/25/22 1849     WBC 9 25 Thousand/uL      RBC 3 74 Million/uL      Hemoglobin 9 4 g/dL      Hematocrit 30 3 %      MCV 81 fL      MCH 25 1 pg      MCHC 31 0 g/dL      RDW 17 9 %      MPV 11 5 fL      Platelets 949 Thousands/uL      nRBC 0 /100 WBCs      Neutrophils Relative 79 %      Immat GRANS % 0 %      Lymphocytes Relative 13 %      Monocytes Relative 7 %      Eosinophils Relative 1 %      Basophils Relative 0 %      Neutrophils Absolute 7 29 Thousands/µL      Immature Grans Absolute 0 02 Thousand/uL      Lymphocytes Absolute 1 19 Thousands/µL      Monocytes Absolute 0 63 Thousand/µL      Eosinophils Absolute 0 10 Thousand/µL      Basophils Absolute 0 02 Thousands/µL                  XR chest 1 view portable    (Results Pending)              Procedures  Procedures         ED Course  ED Course as of 03/25/22 2031   Fri Mar 25, 2022   1849 WBC: 9 25   1850 Hemoglobin(!): 9 4  Baseline 8   1856 Patient refused suctioning, states it causes her to gag   1941 ECG 12 Lead Documentation  Date/Time: today/date: 3/25/2022  Performed by: Elke Garcia  Authorized by:  Elke Garcia    ECG reviewed by me, the ED Provider: yes    Patient location:  ED   Previous ECG: If available: NSR RBBB  Rate:  ECG rate assessment: normal  60  Rhythm: sinus rhythm    Ectopy: RBBB    QRS axis:  Normal  Intervals: normal   Q waves: None   ST segments:  No acute ST changes  T waves: T wave flattening III, inversion V2      Impression: Normal Sinus EKG RBBB       1942 BNP(!): 606  Highest, noticed increased leg swelling than before   1957 Patient now amenable to suctioning             MDM     Sob, suspect secondary to mild chf exacerbation given leg swelling and elevated bnp  Non specific t wave inversions on EKG, trop at baseline 28, no chest pain currently  Discussed with medicine admitted to their service for further management  Disposition  Final diagnoses:   SOB (shortness of breath)   CHF exacerbation (HCC)   Elevated brain natriuretic peptide (BNP) level     Time reflects when diagnosis was documented in both MDM as applicable and the Disposition within this note     Time User Action Codes Description Comment    3/25/2022  8:18 PM Marshall Doyne Add [R06 02] SOB (shortness of breath)     3/25/2022  8:18 PM Marshall Doyne Add [I50 9] CHF exacerbation ([address] )     3/25/2022  8:18 PM Marshall Doyne Add [R79 89] Elevated brain natriuretic peptide (BNP) level       ED Disposition     ED Disposition Condition Date/Time Comment    Admit Stable Fri Mar 25, 2022  8:18 PM Case was discussed with TARYN and the patient's admission status was agreed to be Admission Status: observation status to the service of Dr Ruy Pierson  Follow-up Information    None         Patient's Medications   Discharge Prescriptions    No medications on file       No discharge procedures on file      PDMP Review       Value Time User    PDMP Reviewed  Yes 3/14/2022  2:02 PM Dianne De La Fuente MD          ED Provider  Electronically Signed by           Nicholas Stack MD  03/25/22 2031

## 2022-03-25 NOTE — ED NOTES
Pt refused suctioning by respiratory   Pt on monitor with call nathan in reach     Samara Joshua, MONE  03/25/22 [address]au RN  03/25/22 2005

## 2022-03-26 LAB
ANION GAP SERPL CALCULATED.3IONS-SCNC: 11 MMOL/L (ref 4–13)
ATRIAL RATE: 60 BPM
BASOPHILS # BLD AUTO: 0.03 THOUSANDS/ΜL (ref 0–0.1)
BASOPHILS NFR BLD AUTO: 0 % (ref 0–1)
BUN SERPL-MCNC: 13 MG/DL (ref 5–25)
CALCIUM SERPL-MCNC: 8.5 MG/DL (ref 8.4–10.2)
CHLORIDE SERPL-SCNC: 106 MMOL/L (ref 96–108)
CO2 SERPL-SCNC: 26 MMOL/L (ref 21–32)
CREAT SERPL-MCNC: 0.82 MG/DL (ref 0.6–1.3)
EOSINOPHIL # BLD AUTO: 0.14 THOUSAND/ΜL (ref 0–0.61)
EOSINOPHIL NFR BLD AUTO: 2 % (ref 0–6)
ERYTHROCYTE [DISTWIDTH] IN BLOOD BY AUTOMATED COUNT: 18.1 % (ref 11.6–15.1)
FLUAV RNA RESP QL NAA+PROBE: NEGATIVE
FLUBV RNA RESP QL NAA+PROBE: NEGATIVE
GFR SERPL CREATININE-BSD FRML MDRD: 80 ML/MIN/1.73SQ M
GLUCOSE P FAST SERPL-MCNC: 161 MG/DL (ref 65–99)
GLUCOSE SERPL-MCNC: 161 MG/DL (ref 65–140)
GLUCOSE SERPL-MCNC: 202 MG/DL (ref 65–140)
GLUCOSE SERPL-MCNC: 202 MG/DL (ref 65–140)
GLUCOSE SERPL-MCNC: 213 MG/DL (ref 65–140)
GLUCOSE SERPL-MCNC: 228 MG/DL (ref 65–140)
HCT VFR BLD AUTO: 27.6 % (ref 34.8–46.1)
HGB BLD-MCNC: 8.4 G/DL (ref 11.5–15.4)
IMM GRANULOCYTES # BLD AUTO: 0.02 THOUSAND/UL (ref 0–0.2)
IMM GRANULOCYTES NFR BLD AUTO: 0 % (ref 0–2)
LYMPHOCYTES # BLD AUTO: 1.36 THOUSANDS/ΜL (ref 0.6–4.47)
LYMPHOCYTES NFR BLD AUTO: 16 % (ref 14–44)
MAGNESIUM SERPL-MCNC: 1.6 MG/DL (ref 1.9–2.7)
MCH RBC QN AUTO: 25.3 PG (ref 26.8–34.3)
MCHC RBC AUTO-ENTMCNC: 30.4 G/DL (ref 31.4–37.4)
MCV RBC AUTO: 83 FL (ref 82–98)
MONOCYTES # BLD AUTO: 0.65 THOUSAND/ΜL (ref 0.17–1.22)
MONOCYTES NFR BLD AUTO: 8 % (ref 4–12)
NEUTROPHILS # BLD AUTO: 6.24 THOUSANDS/ΜL (ref 1.85–7.62)
NEUTS SEG NFR BLD AUTO: 74 % (ref 43–75)
NRBC BLD AUTO-RTO: 0 /100 WBCS
P AXIS: 6 DEGREES
PLATELET # BLD AUTO: 322 THOUSANDS/UL (ref 149–390)
PMV BLD AUTO: 12 FL (ref 8.9–12.7)
POTASSIUM SERPL-SCNC: 3 MMOL/L (ref 3.5–5.3)
PR INTERVAL: 182 MS
QRS AXIS: -43 DEGREES
QRSD INTERVAL: 137 MS
QT INTERVAL: 533 MS
QTC INTERVAL: 533 MS
RBC # BLD AUTO: 3.32 MILLION/UL (ref 3.81–5.12)
RSV RNA RESP QL NAA+PROBE: NEGATIVE
SARS-COV-2 RNA RESP QL NAA+PROBE: NEGATIVE
SODIUM SERPL-SCNC: 143 MMOL/L (ref 135–147)
T WAVE AXIS: 36 DEGREES
VENTRICULAR RATE: 60 BPM
WBC # BLD AUTO: 8.44 THOUSAND/UL (ref 4.31–10.16)

## 2022-03-26 PROCEDURE — 94760 N-INVAS EAR/PLS OXIMETRY 1: CPT

## 2022-03-26 PROCEDURE — 0241U HB NFCT DS VIR RESP RNA 4 TRGT: CPT | Performed by: INTERNAL MEDICINE

## 2022-03-26 PROCEDURE — 93010 ELECTROCARDIOGRAM REPORT: CPT | Performed by: INTERNAL MEDICINE

## 2022-03-26 PROCEDURE — 82948 REAGENT STRIP/BLOOD GLUCOSE: CPT

## 2022-03-26 PROCEDURE — 99223 1ST HOSP IP/OBS HIGH 75: CPT | Performed by: INTERNAL MEDICINE

## 2022-03-26 PROCEDURE — 83735 ASSAY OF MAGNESIUM: CPT | Performed by: INTERNAL MEDICINE

## 2022-03-26 PROCEDURE — 85025 COMPLETE CBC W/AUTO DIFF WBC: CPT

## 2022-03-26 PROCEDURE — 80048 BASIC METABOLIC PNL TOTAL CA: CPT

## 2022-03-26 PROCEDURE — 99233 SBSQ HOSP IP/OBS HIGH 50: CPT | Performed by: INTERNAL MEDICINE

## 2022-03-26 PROCEDURE — 94640 AIRWAY INHALATION TREATMENT: CPT

## 2022-03-26 RX ORDER — DEXTROSE MONOHYDRATE 100 MG/ML
INJECTION, SOLUTION INTRAVENOUS
Status: DISCONTINUED
Start: 2022-03-26 | End: 2022-03-26 | Stop reason: WASHOUT

## 2022-03-26 RX ORDER — POTASSIUM CHLORIDE 20 MEQ/1
40 TABLET, EXTENDED RELEASE ORAL ONCE
Status: DISCONTINUED | OUTPATIENT
Start: 2022-03-26 | End: 2022-03-28 | Stop reason: HOSPADM

## 2022-03-26 RX ORDER — POTASSIUM CHLORIDE 20 MEQ/1
40 TABLET, EXTENDED RELEASE ORAL ONCE
Status: COMPLETED | OUTPATIENT
Start: 2022-03-26 | End: 2022-03-26

## 2022-03-26 RX ADMIN — LOSARTAN POTASSIUM 50 MG: 50 TABLET, FILM COATED ORAL at 08:45

## 2022-03-26 RX ADMIN — ESCITALOPRAM OXALATE 10 MG: 10 TABLET ORAL at 08:45

## 2022-03-26 RX ADMIN — METOPROLOL TARTRATE 25 MG: 25 TABLET, FILM COATED ORAL at 08:45

## 2022-03-26 RX ADMIN — Medication 6 MG: at 21:19

## 2022-03-26 RX ADMIN — APIXABAN 5 MG: 5 TABLET, FILM COATED ORAL at 08:45

## 2022-03-26 RX ADMIN — IPRATROPIUM BROMIDE 0.5 MG: 0.5 SOLUTION RESPIRATORY (INHALATION) at 08:11

## 2022-03-26 RX ADMIN — INSULIN GLARGINE 8 UNITS: 100 INJECTION, SOLUTION SUBCUTANEOUS at 21:18

## 2022-03-26 RX ADMIN — FAMOTIDINE 20 MG: 20 TABLET ORAL at 08:45

## 2022-03-26 RX ADMIN — SPIRONOLACTONE 100 MG: 25 TABLET ORAL at 08:45

## 2022-03-26 RX ADMIN — TRAZODONE HYDROCHLORIDE 50 MG: 50 TABLET ORAL at 21:19

## 2022-03-26 RX ADMIN — APIXABAN 5 MG: 5 TABLET, FILM COATED ORAL at 17:15

## 2022-03-26 RX ADMIN — INSULIN LISPRO 2 UNITS: 100 INJECTION, SOLUTION INTRAVENOUS; SUBCUTANEOUS at 21:18

## 2022-03-26 RX ADMIN — IPRATROPIUM BROMIDE 0.5 MG: 0.5 SOLUTION RESPIRATORY (INHALATION) at 20:07

## 2022-03-26 RX ADMIN — FUROSEMIDE 20 MG: 10 INJECTION, SOLUTION INTRAMUSCULAR; INTRAVENOUS at 17:15

## 2022-03-26 RX ADMIN — IPRATROPIUM BROMIDE 0.5 MG: 0.5 SOLUTION RESPIRATORY (INHALATION) at 14:42

## 2022-03-26 RX ADMIN — LEVALBUTEROL HYDROCHLORIDE 1.25 MG: 1.25 SOLUTION RESPIRATORY (INHALATION) at 08:11

## 2022-03-26 RX ADMIN — INSULIN LISPRO 2 UNITS: 100 INJECTION, SOLUTION INTRAVENOUS; SUBCUTANEOUS at 08:48

## 2022-03-26 RX ADMIN — ATORVASTATIN CALCIUM 40 MG: 40 TABLET, FILM COATED ORAL at 08:45

## 2022-03-26 RX ADMIN — PREGABALIN 75 MG: 75 CAPSULE ORAL at 08:45

## 2022-03-26 RX ADMIN — TICAGRELOR 90 MG: 90 TABLET ORAL at 21:15

## 2022-03-26 RX ADMIN — TICAGRELOR 90 MG: 90 TABLET ORAL at 08:45

## 2022-03-26 RX ADMIN — INSULIN LISPRO 2 UNITS: 100 INJECTION, SOLUTION INTRAVENOUS; SUBCUTANEOUS at 17:16

## 2022-03-26 RX ADMIN — POTASSIUM CHLORIDE 40 MEQ: 1500 TABLET, EXTENDED RELEASE ORAL at 08:45

## 2022-03-26 RX ADMIN — LEVALBUTEROL HYDROCHLORIDE 1.25 MG: 1.25 SOLUTION RESPIRATORY (INHALATION) at 14:42

## 2022-03-26 RX ADMIN — AMIODARONE HYDROCHLORIDE 100 MG: 200 TABLET ORAL at 08:46

## 2022-03-26 RX ADMIN — FAMOTIDINE 20 MG: 20 TABLET ORAL at 17:15

## 2022-03-26 RX ADMIN — FUROSEMIDE 20 MG: 10 INJECTION, SOLUTION INTRAMUSCULAR; INTRAVENOUS at 08:48

## 2022-03-26 RX ADMIN — METOPROLOL TARTRATE 25 MG: 25 TABLET, FILM COATED ORAL at 21:11

## 2022-03-26 RX ADMIN — INSULIN LISPRO 2 UNITS: 100 INJECTION, SOLUTION INTRAVENOUS; SUBCUTANEOUS at 17:15

## 2022-03-26 NOTE — RESPIRATORY THERAPY NOTE
RT Protocol Note  Isai Morocho 55 y o  female MRN: [id number]  Unit/Bed#: -01 Encounter: [id number]    Assessment    Principal Problem:    Acute on chronic combined systolic and diastolic heart failure (HCC)  Active Problems:    Type 2 diabetes mellitus treated with insulin (HCC)    A-fib (HCC)    Acute on chronic respiratory failure with hypoxia (HCC)    CAD (coronary artery disease)    Elevated troponin      Home Pulmonary Medications:  Albuterol       Past Medical History:   Diagnosis Date    Anxiety     Aortic aneurysm (HCC)     Arthritis     Depression     Diabetes mellitus (HCC)     Fibromyalgia     GERD (gastroesophageal reflux disease)     GERD (gastroesophageal reflux disease)     H/O cardiovascular stress test 09/2018    no ischemia  EF 70%   H/O echocardiogram 01/2019    EF 60%  Mild LVH  trivial effusion   Hyperlipidemia     Hypertension     Migraines     Psychiatric disorder     anxiety    Uncontrolled hypertension 2/25/2015    Last Assessment & Plan:  BP today above goal <140/90, apparently asymptomatic  Prior BP elevations were attributed to not taking medication  Consider increased medication if persistent on f/u  Social History     Socioeconomic History    Marital status: Legally      Spouse name: None    Number of children: None    Years of education: None    Highest education level: None   Occupational History    None   Tobacco Use    Smoking status: Former Smoker     Packs/day: 1 00     Years: 30 00     Pack years: 30 00     Types: Cigarettes    Smokeless tobacco: Never Used   Vaping Use    Vaping Use: Never used   Substance and Sexual Activity    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       Drug use: Yes     Types: Marijuana     Comment: medical; seldom use    Sexual activity: Yes     Birth control/protection: Female Sterilization     Comment: Monogamous relationship with monogamous partner   Other Topics Concern    None Social History Narrative    Most recent tobacco use screenin2019    Do you currently or have you served in the [employer]: No    Were you activated, into active duty, as a member of the [employer] or as a Reservist: No    Advance directive: No    Chewing tobacco: none    Alcohol intake: None    Marital status: Single    Live alone or with others: with others    Family history of heart disease:    aortic aneurysm    High cholesterol: Yes    High blood pressure: Yes    Exercise level: Heavy    Overweight: Yes    Obese: Yes    Diabetes: Yes    General stress level: High    Diet: Regular    Caffeine intake: Occasional    Guns present in home: No    Seat belts used routinely: Yes    Sexual orientation: Heterosexual    Sunscreen used routinely: No    Seat belt/car seat used routinely: Yes    Exercise-How often do you exercise: as tolerated    Do you have regular medical check ups: Yes    Do you have regular dental check ups: Yes    Illicit drugs-years of use:    recovery 23 years - HX of     Social Determinants of Health     Financial Resource Strain: Not on file   Food Insecurity: Food Insecurity Present    Worried About Running Out of Food in the Last Year: Sometimes true    Lela of Food in the Last Year: Sometimes true   Transportation Needs: No Transportation Needs    Lack of Transportation (Medical): No    Lack of Transportation (Non-Medical):  No   Physical Activity: Not on file   Stress: Not on file   Social Connections: Not on file   Intimate Partner Violence: Not on file   Housing Stability: Low Risk     Unable to Pay for Housing in the Last Year: No    Number of Places Lived in the Last Year: 1    Unstable Housing in the Last Year: No       Subjective         Objective    Physical Exam:   Assessment Type: Assess only  General Appearance: Awake,Alert  Respiratory Pattern: Dyspnea with exertion  Chest Assessment: Chest expansion symmetrical  Bilateral Breath Sounds: Coarse    Vitals:  Blood pressure (!) 187/99, pulse 74, temperature (!) 96 8 °F (36 °C), temperature source Tympanic, resp  rate 22, height 5' 9" (1 753 m), weight 92 kg (202 lb 13 2 oz), SpO2 100 %  Imaging and other studies: I have personally reviewed pertinent reports  Plan             Resp Comments: (P) Pt  remains on trach collar  Trach tube shiley #6  Trach care was done  Neb tx  was given  pt  on 30% humidified oxygen  No resp  distress noted at this time  We will continue to monitor

## 2022-03-26 NOTE — ASSESSMENT & PLAN NOTE
Wt Readings from Last 3 Encounters:   03/25/22 90 7 kg (200 lb)   03/03/22 85 2 kg (187 lb 13 3 oz)   11/23/21 87 5 kg (192 lb 14 4 oz)     · Presents with SOB/dyspnea, hypoxia with increase in chronic O2 requirement and b/l edema  Reports PND, orthopnea  At time of exam, NAD with baseline O2 of 10L, SpO2 98%  Appears volume overloaded with +2 edema and rales  · +13 lb weight gain since recent hospitalization early March for acute hypoxic respiratory failure 2/2 to CHF exacerbation requiring ICU stay  Obtain standing scale weight  · CXR: pending official read, appears with mild vascular congestion  ·   · Echo 2/2022: EF 50%, moderate hypokinesis of inferior and basal anterolateral walls  Improvement in EF  · S/p 20mg IV lasix in ED, give additional 20mg now  · Continue with IV lasix 20mg b i d  · Reports compliance with home lasix 20mg qd and low sodium diet   Consider increasing diuretic dose at discharge  · Standing daily weights, strict I&Os  · Cardiac diet/1500mL fluid restriction  · Cardiology consult

## 2022-03-26 NOTE — ASSESSMENT & PLAN NOTE
Currently at baseline oxygen requirements,  Chest x-ray reported as bilateral ground-glass opacities, will check COVID-19  Continue IV Lasix, supplemental oxygen

## 2022-03-26 NOTE — ASSESSMENT & PLAN NOTE
· Non MI troponin elevation  · Denies chest pain  Only endorses dyspnea  · Troponin appears at baseline level   Elevation likely due to chronic respiratory failure/volume overload   · ECG: NSR, 60 with T wave inversion in V2

## 2022-03-26 NOTE — ASSESSMENT & PLAN NOTE
Lab Results   Component Value Date    HGBA1C 6 6 (H) 01/01/2022       No results for input(s): POCGLU in the last 72 hours      Blood Sugar Average: Last 72 hrs:  · Controlled  · Continue home regimen: Lantus 8 units hs, Novolog 2 units TID with meals  · SSI with Acu-Checks ac/hs

## 2022-03-26 NOTE — H&P
[address] 1965, 55 y o  female MRN: [id number]  Unit/Bed#: -01 Encounter: [id number]  Primary Care Provider: Nelli Wilks MD   Date and time admitted to hospital: 3/25/2022  6:24 PM    * Acute on chronic combined systolic and diastolic heart failure [facility])  Assessment & Plan  Wt Readings from Last 3 Encounters:   03/25/22 90 7 kg (200 lb)   03/03/22 85 2 kg (187 lb 13 3 oz)   11/23/21 87 5 kg (192 lb 14 4 oz)     · Presents with SOB/dyspnea, hypoxia with increase in chronic O2 requirement and b/l edema  Reports PND, orthopnea  At time of exam, NAD with baseline O2 of 10L, SpO2 98%  Appears volume overloaded with +2 edema and rales  · +13 lb weight gain since recent hospitalization early March for acute hypoxic respiratory failure 2/2 to CHF exacerbation requiring ICU stay  Obtain standing scale weight  · CXR: pending official read, appears with mild vascular congestion  ·   · Echo 2/2022: EF 50%, moderate hypokinesis of inferior and basal anterolateral walls  Improvement in EF  · S/p 20mg IV lasix in ED, give additional 20mg now  · Continue with IV lasix 20mg b i d  · Reports compliance with home lasix 20mg qd and low sodium diet  Consider increasing diuretic dose at discharge  · Standing daily weights, strict I&Os  · Cardiac diet/1500mL fluid restriction  · Cardiology consult         Acute on chronic respiratory failure with hypoxia (HCC)  Assessment & Plan  · Baseline 10L trach collar  Initially requiring 15L in ED, now back at baseline  · Likely due to #1  Patient on Eliquis reports compliance, no chest pain/not tachycardic-- low suspicion for PE  Does not appear with infectious etiology  No leukocytosis/fever, no consolidation on imaging  Continue to monitor WBC/fever curve  · Respiratory protocol/trach care    Elevated troponin  Assessment & Plan  · Denies chest pain  Only endorses dyspnea  · Troponin appears at baseline level   Elevation likely due to chronic respiratory failure/volume overload   · Continue to trend troponin   · ECG: NSR, 60 with T wave inversion in V2   · Notify provider for development of chest pain/discomfort     Microcytic anemia  Assessment & Plan  · Hgb 9 4  · Baseline appears in 8 0s  · Last iron panel 10/21 reveals iron deficiency anemia, continue supplementation    CAD (coronary artery disease)  Assessment & Plan  · S/p STEMI and PATRICA 96/5511 with complicated hospitalization involving cardiac arrest, ventilator-dependent respiratory failure and trach/PEG placement  · No acute complaints  · Continue home medications    A-fib (HCC)  Assessment & Plan  · NSR on admission  · Continue Amiodarone, Eliquis, metoprolol    Type 2 diabetes mellitus treated with insulin [facility])  Assessment & Plan  Lab Results   Component Value Date    HGBA1C 6 6 (H) 01/01/2022       No results for input(s): POCGLU in the last 72 hours  Blood Sugar Average: Last 72 hrs:  · Controlled  · Continue home regimen: Lantus 8 units hs, Novolog 2 units TID with meals  · SSI with Acu-Checks ac/hs    Hypertension  Assessment & Plan  · Elevated on admission  · Patient reports did not take home medications this afternoon  Also presented with respiratory distress  · Continue home medications  · Give evening medications now and additional lasix     VTE Pharmacologic Prophylaxis: VTE Score: 6 Eliquis  Code Status: Level 1 - Full Code   Discussion with family: Patient declined call to   Anticipated Length of Stay: Patient will be admitted on an observation basis with an anticipated length of stay of less than 2 midnights secondary to CHF exacerbation requiring IV diuresis  Total Time for Visit, including Counseling / Coordination of Care: 60 minutes Greater than 50% of this total time spent on direct patient counseling and coordination of care      Chief Complaint: dyspnea    History of Present Illness:  Shlomo Bloch is a 55 y o  female with a PMH of HTN, paroxysmal AFib on Eliquis, STEMI , chronic respiratory failure with tracheostomy who presents with complaints of shortness of breath x 3-4 days  Denies fever, chills, cough, chest pain, palpitations, N/V/D, abdominal pain  Does report orthopnea and nighttime awakenings feeling short of breath  Per EMS son suctioned her trach today and pulled out a blood clot, no further bleeding noted  Initially requiring an increase in oxygen from her baseline up to 15 L from 10 L  Now back down to 10 L with SpO2 98%  Reports compliance with home medications and a cardiac/low sodium diet  Patient had a recent hospitalization d/c 3/3/22 requiring ICU stay for ventilatory support due to acute hypoxic respiratory failure secondary to CHF exacerbation  Patient weight tonight noted to be positive 13 lb since discharge on March 3rd  Obtain standing scale weight  BNP elevated, noted be higher than on previous values  Appears volume overloaded on exam   Plan for admission for IV diuresis and Cardiology consult  Review of Systems:  Review of Systems   Constitutional: Positive for unexpected weight change (Does not take daily weights, weight gain noted per record review)  Negative for chills, fatigue and fever  HENT: Negative for congestion  Eyes: Negative for visual disturbance  Respiratory: Positive for shortness of breath  Negative for choking, chest tightness and wheezing  Cardiovascular: Positive for leg swelling  Negative for chest pain and palpitations  Gastrointestinal: Negative for abdominal distention, abdominal pain, diarrhea, nausea and vomiting  Endocrine: Negative for polyuria  Genitourinary: Negative for difficulty urinating, dysuria and frequency  Musculoskeletal: Negative for back pain  Skin: Negative for rash  Allergic/Immunologic: Negative for immunocompromised state  Neurological: Negative for dizziness, syncope and light-headedness     Psychiatric/Behavioral: Negative for sleep disturbance  Past Medical and Surgical History:   Past Medical History:   Diagnosis Date    Anxiety     Aortic aneurysm ([address] )     Arthritis     Depression     Diabetes mellitus (HCC)     Fibromyalgia     GERD (gastroesophageal reflux disease)     GERD (gastroesophageal reflux disease)     H/O cardiovascular stress test 2018    no ischemia  EF 70%   H/O echocardiogram 2019    EF 60%  Mild LVH  trivial effusion   Hyperlipidemia     Hypertension     Migraines     Psychiatric disorder     anxiety    Uncontrolled hypertension 2015    Last Assessment & Plan:  BP today above goal <140/90, apparently asymptomatic  Prior BP elevations were attributed to not taking medication  Consider increased medication if persistent on f/u  Past Surgical History:   Procedure Laterality Date    BACK SURGERY      Lumbar epidural steroid injection    CARDIAC CATHETERIZATION N/A 10/22/2021    Procedure: Cardiac pci;  Surgeon: Jean Davis MD;  [facility]; Service: Cardiology    CARDIAC CATHETERIZATION  10/22/2021    Procedure: Cardiac catheterization;  Surgeon: Jean Davis MD;  [facility]; Service: Cardiology    CARDIAC CATHETERIZATION Left 10/27/2021    Procedure: Cardiac Left Heart Cath;  Surgeon: Catrachita Coburn MD;  [facility]; Service: Cardiology    CARDIAC CATHETERIZATION N/A 10/27/2021    Procedure: Cardiac pci;  Surgeon: Catrachita Coburn MD;  [facility]; Service: Cardiology    CARDIAC CATHETERIZATION N/A 10/28/2021    Procedure: Cardiac pci;  Surgeon: Delfina Bran DO;  [facility];   Service: Cardiology    CARPAL TUNNEL RELEASE Left      SECTION      CHOLECYSTECTOMY      COLONOSCOPY      incomplete    COLONOSCOPY      EYE SURGERY      HYSTERECTOMY      Total    OVARIAN CYST REMOVAL      PEG W/TRACHEOSTOMY PLACEMENT N/A 2021 Procedure: TRACHEOSTOMY WITH INSERTION PEG TUBE;  Surgeon: Judith Vela To, DO;  Location: [facility] MAIN [facility];  Service: General    TUBAL LIGATION      UPPER GASTROINTESTINAL ENDOSCOPY         Meds/Allergies:  Prior to Admission medications    Medication Sig Start Date End Date Taking?  Authorizing Provider   acetaminophen (TYLENOL) 160 mg/5 mL suspension 30 4 mL (975 mg total) by Per G Tube route every 6 (six) hours as needed for mild pain or fever 11/23/21   Bella Puentes PA-C   albuterol (2 5 mg/3 mL) 0 083 % nebulizer solution Take 3 mL (2 5 mg total) by nebulization every 4 (four) hours as needed for wheezing 11/23/21   Jeanna Galeana PA-C   amiodarone 100 mg tablet Take 1 tablet (100 mg total) by mouth daily with breakfast 3/21/22   Daiana Dumont MD   Apidra SoloStar 100 units/mL injection pen INJECT 2 UNITS UNDER THE SKIN 3 TIMES DAILY WITH MEALS 3/22/22   Daiana Dumont MD   apixaban (ELIQUIS) 5 mg Take 1 tablet (5 mg total) by mouth 2 (two) times a day 3/21/22   Daiana Dumont MD   atorvastatin (LIPITOR) 40 mg tablet Take 1 tablet (40 mg total) by mouth daily 3/21/22   Daiana Dumont MD   Basaglar KwikPen 100 units/mL injection pen INJECT 8 UNITS UNDER THE SKIN DAILY 3/22/22   Daiana Dumont MD   Blood Pressure Monitoring (Sphygmomanometer) MISC Use 2 (two) times a day  Patient not taking: Reported on 7/13/2021 4/30/21   Lupe Opitz, MD   chlorhexidine (PERIDEX) 0 12 % solution Apply 15 mL to the mouth or throat every 12 (twelve) hours 11/23/21   Jeanna Galeana PA-C   escitalopram (Lexapro) 10 mg tablet Take 1 tablet (10 mg total) by mouth daily 3/14/22   Daiana Dumont MD   famotidine (PEPCID) 20 mg/2 5 mL oral suspension Take 2 5 mL (20 mg total) by mouth 2 (two) times a day 11/23/21   Bella Puentes PA-C   furosemide (LASIX) 20 mg tablet Take 1 tablet (20 mg total) by mouth daily 3/21/22   Daiana Dumont MD   insulin aspart (NovoLOG FlexPen) 100 UNIT/ML injection pen Inject 2 Units under the skin 3 (three) times a day with meals 3/23/22   Bernie Records, MD   Insulin Pen Needle (Novofine Pen Needle) 32G X 6 MM MISC Use 3 (three) times a day with meals 3/23/22   Bernie Records, MD   Insulin Pen Needle (UNIFINE PENTIPS PLUS) 31G X 6 MM MISC 1 Device by Does not apply route 4 (four) times a day 6/5/17   Historical Provider, MD   ipratropium (ATROVENT) 0 02 % nebulizer solution Take 2 5 mL (0 5 mg total) by nebulization 3 (three) times a day 11/23/21   Marcela Reeves PA-C   Lancets MISC 1 Device by Does not apply route 4 (four) times a day  Patient not taking: Reported on 7/13/2021    Historical Provider, MD   levalbuterol (XOPENEX) 1 25 mg/0 5 mL nebulizer solution Take 0 5 mL (1 25 mg total) by nebulization 3 (three) times a day 11/23/21   Bella Puentes PA-C   LORazepam (Ativan) 1 mg tablet Take 0 5 tablets (0 5 mg total) by mouth 2 (two) times a day as needed for anxiety 3/14/22   Bernie Records, MD   losartan (COZAAR) 50 mg tablet Take 1 tablet (50 mg total) by mouth daily 3/21/22   Bernie Records, MD   melatonin 3 mg Take 2 tablets (6 mg total) by mouth daily at bedtime 3/15/22   Bernie Records, MD   metoprolol tartrate (LOPRESSOR) 25 mg tablet Take 1 tablet (25 mg total) by mouth every 12 (twelve) hours 3/21/22   Bernie Records, MD   ondansetron (ZOFRAN) 4 mg/2 mL injection Infuse 2 mL (4 mg total) into a venous catheter every 6 (six) hours as needed for nausea or vomiting 11/23/21   Bella Puentes PA-C   polyethylene glycol (MIRALAX) 17 g packet Take 17 g by mouth daily 11/23/21   Bella Puentes PA-C   potassium chloride 10% oral solution Take 15 mL (20 mEq total) by mouth daily 11/24/21   Bella Puentes PA-C   pregabalin (LYRICA) 75 mg capsule TAKE ONE CAPSULE EVERY DAY 1/29/21   Daja Guillory MD   senna-docusate sodium (SENOKOT S) 8 6-50 mg per tablet Take 1 tablet by mouth daily at bedtime 11/23/21   Bella Puentes PA-C   spironolactone (ALDACTONE) 100 mg tablet Take 1 tablet (100 mg total) by mouth daily 3/21/22   Bernie Foster MD ticagrelor (BRILINTA) 90 MG Take 1 tablet (90 mg total) by mouth every 12 (twelve) hours 3/21/22   Kenna Cody MD   traZODone (DESYREL) 50 mg tablet TAKE 0 5 TABLET (25MG) BY MOUTH NIGHTLY AS NEEDED FOR SLEEP  2/10/22   Historical Provider, MD   pantoprazole (PROTONIX) 20 mg tablet Take 2 tablets (40 mg total) by mouth daily 5/17/21 7/7/21  Kenna Cody MD     I have reviewed home medications with patient personally  Allergies: Allergies   Allergen Reactions    Metformin Diarrhea    Clonidine Rash     Patch        Social History:  Marital Status: Legally    Occupation:  Patient Pre-hospital Living Situation: Home  Patient Pre-hospital Level of Mobility: walks with cane  Patient Pre-hospital Diet Restrictions:   Substance Use History:   Social History     Substance and Sexual Activity   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  Social History     Tobacco Use   Smoking Status Former Smoker    Packs/day: 1 00    Years: 30 00    Pack years: 30 00    Types: Cigarettes   Smokeless Tobacco Never Used     Social History     Substance and Sexual Activity   Drug Use Yes    Types: Marijuana    Comment: medical; seldom use       Family History:  Family History   Problem Relation Age of Onset    Hypertension Mother    [facility] Arthritis Mother     Diabetes Father     Other Sister         renal cell carcinoma    Diabetes Other     Factor V Leiden deficiency Other        Physical Exam:     Vitals:   Blood Pressure: (!) 187/99 (03/25/22 2056)  Pulse: 74 (03/25/22 2131)  Temperature: (!) 96 8 °F (36 °C) (03/25/22 2056)  Temp Source: Tympanic (03/25/22 2056)  Respirations: 22 (03/25/22 2056)  Height: 5' 9" (175 3 cm) (03/25/22 2056)  Weight - Scale: 92 kg (202 lb 13 2 oz) (03/25/22 2056)  SpO2: 100 % (03/25/22 2144)    Physical Exam  Vitals and nursing note reviewed  Constitutional:       General: She is not in acute distress  Appearance: She is ill-appearing (chronic)     HENT: Head: Normocephalic and atraumatic  Mouth/Throat:      Mouth: Mucous membranes are dry  Eyes:      Extraocular Movements: Extraocular movements intact  Pupils: Pupils are equal, round, and reactive to light  Neck:      Comments: Trach collar in place  Cardiovascular:      Rate and Rhythm: Normal rate and regular rhythm  Pulses: Normal pulses  Heart sounds: Murmur heard  Pulmonary:      Effort: Pulmonary effort is normal  No respiratory distress  Breath sounds: Rhonchi and rales present  No wheezing  Chest:      Chest wall: No tenderness  Abdominal:      General: Abdomen is flat  Bowel sounds are normal  There is no distension  Palpations: Abdomen is soft  Tenderness: There is no abdominal tenderness  Musculoskeletal:      Right lower leg: Edema present  Left lower leg: Edema present  Skin:     Capillary Refill: Capillary refill takes less than 2 seconds  Neurological:      General: No focal deficit present  Mental Status: She is alert and oriented to person, place, and time     Psychiatric:         Mood and Affect: Mood normal          Behavior: Behavior normal         Additional Data:   Lab Results:  Results from last 7 days   Lab Units 03/25/22  1841   WBC Thousand/uL 9 25   HEMOGLOBIN g/dL 9 4*   HEMATOCRIT % 30 3*   PLATELETS Thousands/uL 359   NEUTROS PCT % 79*   LYMPHS PCT % 13*   MONOS PCT % 7   EOS PCT % 1     Results from last 7 days   Lab Units 03/25/22  1841   SODIUM mmol/L 140   POTASSIUM mmol/L 3 6   CHLORIDE mmol/L 105   CO2 mmol/L 25   BUN mg/dL 14   CREATININE mg/dL 0 83   ANION GAP mmol/L 10   CALCIUM mg/dL 9 0   ALBUMIN g/dL 3 6   TOTAL BILIRUBIN mg/dL 1 00   ALK PHOS U/L 82   ALT U/L 10   AST U/L 15   GLUCOSE RANDOM mg/dL 244*         Results from last 7 days   Lab Units 03/25/22  2155   POC GLUCOSE mg/dl 227*               Imaging: Reviewed radiology reports from this admission including: chest xray  XR chest 1 view portable (Results Pending)       EKG and Other Studies Reviewed on Admission:   · EKG: NSR  HR 60     ** Please Note: This note has been constructed using a voice recognition system   **

## 2022-03-26 NOTE — ASSESSMENT & PLAN NOTE
· S/p STEMI and PATRICA 16/6948 with complicated hospitalization involving cardiac arrest, ventilator-dependent respiratory failure and trach/PEG placement    · No acute complaints  · Continue home medications

## 2022-03-26 NOTE — CONSULTS
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%  She had prolonged ventilation following cardiac arrest   It is deemed that she is not a candidate for decannulation at this time  Currently her tracheostomy tube has been replaced after it accidentally got dislodged partially  Monitor closely  3  Congestive heart failure:  She has history of chronic systolic and diastolic heart failure and has been on diuretic therapy with furosemide and spironolactone  She also has history of cardiac arrhythmias  She has LifeVest on  She is waiting for defibrillator placement  She has history of atrial fibrillation and has been on amiodarone  She is on systemic anticoagulation with Eliquis  4  COPD emphysema:  She was a smoker in the past   Her previous CT scan had shown evidence of structural emphysema  She has no previous PFTs  She is currently on nebulized bronchodilator therapy  She has minimal secretions  Spoke to the patient answered all questions  Discussed with Dr Johnson the hospitalist on the case  Thank you for allowing me to participate in the care of the patient  History of Present Illness      Physician Requesting Consult: Riana Hill MD     Reason for Consult / Principal Problem:  Chronic respiratory failure; tracheostomy; tracheostomy tube dislodged  Hx and PE limited by:  Unable to speak because of tracheostomy    HPI: Kiran Olivarez is a 55y o  year old female  with past medical history of coronary artery disease status post STEMI with cardiogenic shock, recurrent ventricular tachycardia and cardiac arrest who had a prolonged hospital stay October to November 2021 at [facility]    Her echocardiogram had shown low ejection fraction with EF of 35-40% and grade 2 diastolic dysfunction  She also had atrial fibrillation with rapid ventricular rate and CVA with no residual deficit  She underwent tracheostomy and PEG tube placement and was transferred to [facility]  He had another cardiac arrest while there with initial rhythm of PA going into ventricular tachycardia and was thought to be secondary to respiratory reasons  She has been on a cuffless tracheostomy tube  She is no more on PEG feeds  She was admitted to [facility] in February when her tracheostomy was changed to a cuffed one  This was changed back to a cuffless 6  Shiley on discharge  Her other medical problems are chronic systolic and diastolic heart failure on diuretic therapy, pulmonary hypertension and atrial fibrillation  She is a previous smoker and is suspected to have COPD emphysema  She is currently admitted with worsening shortness of breath since few days  She was found to be in congestive heart failure and was started on diuretic therapy  She has been on tracheostomy collar and has been on oxygen supplementation  Her tracheostomy tube was partially displaced this morning  Attempts to replace this were not successful initially  The patient was saturating well and has continued on oxygen supplementation  Surgery service was subsequently consulted and the tracheostomy tube has been replaced  Currently she is on FiO2 30% using trach collar  There is no bleeding at the tracheostomy site  She states that her shortness of breath is better  She has history of 60% subglottic stenosis  She was deemed not a candidate to discontinue tracheostomy, before  She wants to know whether she can ever come off the tracheostomy  Denies any cough or hemoptysis  No chest pain or palpitations  No fever or chills  She has LifeVest   She is on bronchodilator therapy  She was a smoker for a long time  No previous PFT    She has been on bronchodilator therapy   Consults    Review of Systems   Constitutional: Negative for appetite change, chills, fatigue and fever  HENT: Negative for facial swelling, hearing loss, rhinorrhea and sore throat  Respiratory: Positive for cough and shortness of breath  Negative for chest tightness  Cardiovascular: Negative for chest pain, palpitations and leg swelling  Gastrointestinal: Negative for abdominal pain, constipation, diarrhea, nausea and vomiting  Genitourinary: Negative for dysuria, frequency and urgency  Musculoskeletal: Positive for gait problem (Uses cane for ambulation)  Negative for arthralgias and joint swelling  Skin: Negative for rash  Allergic/Immunologic: Negative for environmental allergies  Neurological: Negative for syncope, light-headedness and headaches  Psychiatric/Behavioral: Negative for agitation  The patient is nervous/anxious  Historical Information   Past Medical History:   Diagnosis Date    Anxiety     Aortic aneurysm ([facility] [address] )     Arthritis     Depression     Diabetes mellitus (HCC)     Fibromyalgia     GERD (gastroesophageal reflux disease)     GERD (gastroesophageal reflux disease)     H/O cardiovascular stress test 09/2018    no ischemia  EF 70%   H/O echocardiogram 01/2019    EF 60%  Mild LVH  trivial effusion   Hyperlipidemia     Hypertension     Migraines     Psychiatric disorder     anxiety    Uncontrolled hypertension 2/25/2015    Last Assessment & Plan:  BP today above goal <140/90, apparently asymptomatic  Prior BP elevations were attributed to not taking medication  Consider increased medication if persistent on f/u  Past Surgical History:   Procedure Laterality Date    BACK SURGERY      Lumbar epidural steroid injection    CARDIAC CATHETERIZATION N/A 10/22/2021    Procedure: Cardiac pci;  Surgeon: Chandrakant Stringer MD;  [facility];   Service: Cardiology    CARDIAC CATHETERIZATION 10/22/2021    Procedure: Cardiac catheterization;  Surgeon: Jay Beaulieu MD;  [facility]; Service: Cardiology    CARDIAC CATHETERIZATION Left 10/27/2021    Procedure: Cardiac Left Heart Cath;  Surgeon: Darnell Blanco MD;  [facility]; Service: Cardiology    CARDIAC CATHETERIZATION N/A 10/27/2021    Procedure: Cardiac pci;  Surgeon: Darnell Blanco MD;  [facility]; Service: Cardiology    CARDIAC CATHETERIZATION N/A 10/28/2021    Procedure: Cardiac pci;  Surgeon: Rashid Mejias DO;  [facility]; Service: Cardiology    CARPAL TUNNEL RELEASE Left      SECTION      CHOLECYSTECTOMY      COLONOSCOPY      incomplete    COLONOSCOPY      EYE SURGERY      HYSTERECTOMY      Total    OVARIAN CYST REMOVAL      PEG W/TRACHEOSTOMY PLACEMENT N/A 2021    Procedure: TRACHEOSTOMY WITH INSERTION PEG TUBE;  Surgeon: Samella Najjar To, DO;  Location: [facility] MAIN [facility];  Service: General    TUBAL LIGATION      UPPER GASTROINTESTINAL ENDOSCOPY       Social History   Social History     Substance and Sexual Activity   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        Social History     Substance and Sexual Activity   Drug Use Yes    Types: Marijuana    Comment: medical; seldom use     E-Cigarette/Vaping    E-Cigarette Use Never User      E-Cigarette/Vaping Substances    Nicotine No     THC No     CBD No     Flavoring No     Other No     Unknown No      Social History     Tobacco Use   Smoking Status Former Smoker    Packs/day: 1 00    Years: 30 00    Pack years: 30 00    Types: Cigarettes   Smokeless Tobacco Never Used     Occupational History:  Noncontributory    Family History: non-contributory    Meds/Allergies   all current active meds have been reviewed    Allergies   Allergen Reactions    Metformin Diarrhea    Clonidine Rash     Patch        Objective   Vitals: Blood pressure 154/81, pulse 62, temperature Cleave Rast ) 96 5 °F (35 8 °C), temperature source Tympanic, resp  rate 18, height 5' 9" (1 753 m), weight 88 kg (194 lb), SpO2 94 %  ,Body mass index is 28 65 kg/m²  Intake/Output Summary (Last 24 hours) at 3/26/2022 1548  Last data filed at 3/25/2022 2300  Gross per 24 hour   Intake 90 ml   Output --   Net 90 ml     Invasive Devices  Report    Peripheral Intravenous Line            Peripheral IV 03/25/22 Right Antecubital <1 day          Airway            Surgical Airway Shiley Fenestrated 24 days                Physical Exam  Vitals reviewed  Constitutional:       General: She is not in acute distress  Appearance: She is not ill-appearing, toxic-appearing or diaphoretic  HENT:      Head: Normocephalic  Eyes:      General: No scleral icterus  Conjunctiva/sclera: Conjunctivae normal    Neck:      Comments: Has cuffless tracheostomy tube  Tracheostomy site looks clean  No bleeding  Cardiovascular:      Rate and Rhythm: Normal rate  Heart sounds: Normal heart sounds  No murmur heard  Comments: Has LifeVest   Pulmonary:      Effort: No respiratory distress  Breath sounds: Normal breath sounds  No stridor  No wheezing, rhonchi or rales  Chest:      Chest wall: No tenderness  Abdominal:      General: Bowel sounds are normal       Palpations: Abdomen is soft  Tenderness: There is no abdominal tenderness  There is no guarding  Musculoskeletal:      Cervical back: No rigidity  Right lower leg: No edema  Left lower leg: No edema  Lymphadenopathy:      Cervical: No cervical adenopathy  Skin:     Coloration: Skin is not jaundiced or pale  Neurological:      Mental Status: She is alert and oriented to person, place, and time  Gait: Gait abnormal    Psychiatric:         Mood and Affect: Mood normal          Behavior: Behavior normal          Thought Content:  Thought content normal          Judgment: Judgment normal          Lab Results: I have personally reviewed pertinent lab results  no leukocytosis  Hemoglobin 8 4  Creatinine normal   Elevated BNP  Imaging Studies: I have personally reviewed pertinent films in PACS  The chest x-ray showed pulmonary vascular congestion and cardiomegaly    Life vest  EKG, Pathology, and Other Studies: I have personally reviewed pertinent films in PACS  VTE Prophylaxis: Sequential compression device (Venodyne)  on Eliquis for systemic anticoagulation    Code Status: Level 1 - Full Code  Advance Directive and Living Will:      Power of :    POLST:      None

## 2022-03-26 NOTE — ASSESSMENT & PLAN NOTE
· Elevated on admission  · Patient reports did not take home medications this afternoon   Also presented with respiratory distress  · Continue home medications  · Give evening medications now and additional lasix

## 2022-03-26 NOTE — ASSESSMENT & PLAN NOTE
· S/p STEMI and PATRICA 48/9255 with complicated hospitalization involving cardiac arrest, ventilator-dependent respiratory failure and trach/PEG placement    · No acute complaints  · Continue home medications

## 2022-03-26 NOTE — ASSESSMENT & PLAN NOTE
· Hgb 9 4  · Baseline appears in 8 0s  · Last iron panel 10/21 reveals iron deficiency anemia, continue supplementation

## 2022-03-26 NOTE — PROGRESS NOTES
[address]  Progress Note - Tori Latif 1965, 55 y o  female MRN: [id number]  Unit/Bed#: -01 Encounter: [id number]  Primary Care Provider: Dar Olivas MD   Date and time admitted to hospital: 3/25/2022  6:24 PM    * Acute on chronic combined systolic and diastolic heart failure [facility])  Assessment & Plan  Wt Readings from Last 3 Encounters:   03/26/22 88 kg (194 lb)   03/03/22 85 2 kg (187 lb 13 3 oz)   11/23/21 87 5 kg (192 lb 14 4 oz)     · Presents with SOB/dyspnea, hypoxia with increase in chronic O2 requirement and b/l edema  Reports PND, orthopnea  At time of exam, NAD with baseline O2 of 10L, SpO2 98%  Appears volume overloaded with +2 edema and rales  · +13 lb weight gain since recent hospitalization early March for acute hypoxic respiratory failure 2/2 to CHF exacerbation requiring ICU stay  Obtain standing scale weight  · CXR: pending official read, appears with mild vascular congestion  ·   · Echo 2/2022: EF 50%, moderate hypokinesis of inferior and basal anterolateral walls  Improvement in EF  · S/p 20mg IV lasix in ED, give additional 20mg now  · Continue with IV lasix 20mg b i d  · Reports compliance with home lasix 20mg qd and low sodium diet  Consider increasing diuretic dose at discharge  · Standing daily weights, strict I&Os  · Cardiac diet/1500mL fluid restriction  · Cardiology consult-pending        Acute on chronic respiratory failure with hypoxia ([address] )  Assessment & Plan  Currently at baseline oxygen requirements,  Chest x-ray reported as bilateral ground-glass opacities, will check COVID-19  Continue IV Lasix, supplemental oxygen  Microcytic anemia  Assessment & Plan  · Hgb 9 4  · Baseline appears in 8 0s  · Last iron panel 10/21 reveals iron deficiency anemia, continue supplementation    Elevated troponin  Assessment & Plan  · Non MI troponin elevation  · Denies chest pain  Only endorses dyspnea  · Troponin appears at baseline level  Elevation likely due to chronic respiratory failure/volume overload   · ECG: NSR, 60 with T wave inversion in V2     Tracheostomy in place [facility])  Assessment & Plan  Respiratory therapist as noticed that tracheal tube was slightly dislodged, he tried to push it back however he encountered resistance  I have tried to push it back as well, encountered resistance  Patient has no respiratory distress at this time  Last trach change was on March 2, 2022  6  Shiley cuffless  Currently patient not in any acute respiratory distress, saturating well  ENT subspecialties not available at [facility]  Seen by ED physician Dr Ozzy Sosa, unable to replace it back  Discussed with General surgery PA, who will see the patient however I do not see any urgent need to replace it  CAD (coronary artery disease)  Assessment & Plan  · S/p STEMI and PATRICA 55/8217 with complicated hospitalization involving cardiac arrest, ventilator-dependent respiratory failure and trach/PEG placement  · No acute complaints  · Continue home medications    A-fib (HCC)  Assessment & Plan  · NSR on admission  · Continue Amiodarone, Eliquis, metoprolol    Type 2 diabetes mellitus treated with insulin [facility])  Assessment & Plan  Lab Results   Component Value Date    HGBA1C 6 6 (H) 01/01/2022       Recent Labs     03/25/22  2155 03/26/22  0841   POCGLU 227* 202*       Blood Sugar Average: Last 72 hrs:  · (P) 214 5Controlled  · Continue home regimen: Lantus 8 units hs, Novolog 2 units TID with meals  · SSI with Acu-Checks ac/hs    Hypertension  Assessment & Plan    · Continue home medications         VTE Pharmacologic Prophylaxis:   Pharmacologic: Apixaban (Eliquis)  Mechanical VTE Prophylaxis in Place: Yes    Patient Centered Rounds: I have performed bedside rounds with nursing staff today      Discussions with Specialists or Other Care Team Provider:  ED physician, pulmonology, General surgery    Education and Discussions with Family / Patient:  Patient    Time Spent for Care: 45 minutes  More than 50% of total time spent on counseling and coordination of care as described above  Current Length of Stay: 0 day(s)    Current Patient Status: Observation   Certification Statement: The patient will continue to require additional inpatient hospital stay due to CHF    Discharge Plan:  For 24-48 hours    Code Status: Level 1 - Full Code      Subjective:   Patient complains of some shortness of breath, no better, denies any cough, fever, chills  Denies any chest pain, nausea, vomiting, abdominal pain  Objective:     Vitals:   Temp (24hrs), Av 1 °F (36 2 °C), Min:96 5 °F (35 8 °C), Max:98 1 °F (36 7 °C)    Temp:  [96 5 °F (35 8 °C)-98 1 °F (36 7 °C)] 96 5 °F (35 8 °C)  HR:  [60-74] 62  Resp:  [18-28] 18  BP: (139-187)/(78-99) 154/81  SpO2:  [97 %-100 %] 99 %  Body mass index is 28 65 kg/m²  Input and Output Summary (last 24 hours): Intake/Output Summary (Last 24 hours) at 3/26/2022 1204  Last data filed at 3/25/2022 2300  Gross per 24 hour   Intake 90 ml   Output --   Net 90 ml       Physical Exam:     Physical Exam  General appearance:  Patient not in acute distress  Eyes:  Pupils equal reacting to light  ENT:  Moist oral mucous membranes  Neck:  Tracheostomy in place, tracheal tube slightly misplaced outward    CVS:  S1-S2 heard, regular rate and rhythm, 1+ pitting edema  Chest:  Bilateral air entry present, clear to auscultation  Abdomen:  Soft, nontender, bowel sounds present  CNS:  No focal neurological deficits  Genitourinary: deferred  Skin:  No acute rash   psychiatric:  No psychosis  Musculoskeletal:  No joint deformities     Additional Data:     Labs:    Results from last 7 days   Lab Units 22  0517   WBC Thousand/uL 8 44   HEMOGLOBIN g/dL 8 4*   HEMATOCRIT % 27 6*   PLATELETS Thousands/uL 322   NEUTROS PCT % 74   LYMPHS PCT % 16   MONOS PCT % 8   EOS PCT % 2     Results from last 7 days   Lab Units 22  0517 22  1841 22  1841   SODIUM mmol/L 143   < > 140   POTASSIUM mmol/L 3 0*   < > 3 6   CHLORIDE mmol/L 106   < > 105   CO2 mmol/L 26   < > 25   BUN mg/dL 13   < > 14   CREATININE mg/dL 0 82   < > 0 83   ANION GAP mmol/L 11   < > 10   CALCIUM mg/dL 8 5   < > 9 0   ALBUMIN g/dL  --   --  3 6   TOTAL BILIRUBIN mg/dL  --   --  1 00   ALK PHOS U/L  --   --  82   ALT U/L  --   --  10   AST U/L  --   --  15   GLUCOSE RANDOM mg/dL 161*   < > 244*    < > = values in this interval not displayed  Results from last 7 days   Lab Units 03/26/22  1136 03/26/22  0841 03/25/22  2155   POC GLUCOSE mg/dl 213* 202* 227*                   * I Have Reviewed All Lab Data Listed Above  * Additional Pertinent Lab Tests Reviewed:  [address] Admission Reviewed    Imaging:    Chest x-ray reviewed personally by me shows pulmonary congestion    Recent Cultures (last 7 days):           Last 24 Hours Medication List:   Current Facility-Administered Medications   Medication Dose Route Frequency Provider Last Rate    acetaminophen  650 mg Oral Q4H PRN Leda Kline PA-C      albuterol  2 5 mg Nebulization Q4H PRN Leda Kline PA-C      amiodarone  100 mg Oral Daily With Breakfast Leda Kline PA-C      apixaban  5 mg Oral BID Leda Kline PA-C      atorvastatin  40 mg Oral Daily Leda Kline PA-C      escitalopram  10 mg Oral Daily Leda Kline PA-C      famotidine  20 mg Oral BID Leda Kline PA-C      furosemide  20 mg Intravenous BID (diuretic) Leda Kline PA-C      insulin glargine  8 Units Subcutaneous HS Leda Kline PA-C      insulin lispro  1-6 Units Subcutaneous TID AC Leda Kline PA-C      insulin lispro  1-6 Units Subcutaneous HS Leda Kline PA-C      insulin lispro  2 Units Subcutaneous Daily With Breakfast Leda Kline PA-C      insulin lispro  2 Units Subcutaneous Daily With Lunch [address], [place]      insulin lispro  2 Units Subcutaneous Daily With Dinner Leda Morel PA-C      ipratropium  0 5 mg Nebulization TID [address], [place]      levalbuterol  1 25 mg Nebulization TID [address], [place]      LORazepam  0 5 mg Oral BID PRN [address], [place]      losartan  50 mg Oral Daily Leda Morel PA-C      melatonin  6 mg Oral HS Leda Morel PA-C      metoprolol tartrate  25 mg Oral Q12H [facility] Leda Morel PA-C      polyethylene glycol  17 g Oral Daily Leda Morel PA-C      potassium chloride  40 mEq Oral Once Remberto Shah MD      pregabalin  75 mg Oral Daily Leda Morel PA-C      senna-docusate sodium  1 tablet Oral HS Ldea Morel PA-C      spironolactone  100 mg Oral Daily Leda Morel PA-C      ticagrelor  90 mg Oral Q12H [facility] Leda Morel PA-C      traZODone  50 mg Oral HS PRN [address], [place]          Today, Patient Was Seen By: Duane Pitcairn, MD    ** Please Note: Dictation voice to text software may have been used in the creation of this document   **

## 2022-03-26 NOTE — CONSULTS
[facility]                                              Cardiology Consult  Kelli Red 55 y o  female   YOB: 1965 MRN: [id number]  Unit/Bed#: -01 Encounter: [id number]      Physician Requesting Consult: Antonia Grimaldo MD  Reason for Consult / Principal Problem: Heart failure    Assessments  Principal Problem:    Acute on chronic combined systolic and diastolic heart failure ([address] )  Active Problems:    Hypertension    Type 2 diabetes mellitus treated with insulin (HCC)    A-fib (HCC)    Acute on chronic respiratory failure with hypoxia (HCC)    CAD (coronary artery disease)    Tracheostomy in place [facility])    Elevated troponin    Microcytic anemia      Plan  Acute on chronic diastolic heart failure  · Most recent echo done at [address] [place] [address] in February 2022 showed LVEF of 50% with wall motion noted related to prior lateral MI  Moderate LVH  · Weight up from discharge weight of 187 lb to 194 lb, but appears near euvolemic on clinical exam  · She does admit to having had increased edema at presentation, but is now feeling much better after IV Lasix  · Recent discharge diuretic: Lasix 20 mg dailu  · Continue IV Lasix b i d  Today, and plan to transition to p o  Lasix at an increased dose of 40 mg daily from tomorrow  · Continue spironolactone 100 mg daily    CAD s/p PCI  · Has been on ticagrelor plus Eliquis for the last few months without issue so far  · She had mild tracheal bleeding related to stenosis/dislodgement, which is now resolved  · No current chest pain or ongoing anginal issues    H/o multiple cardiac arrests, needing shocks, VT  · Likely related to lateral scar  · Currently has LifeVest in place and is awaiting ICD placement, has follow up with Dr Murrieta in 4/2022  · Continue metoprolol 25 mg bid, amiodarone    Ambulate and reassess for symptoms of shortness of breath    If no shortness of breath or any complains with ambulation, and Cr stable, then stable for discharge from cardiac standpoint on higher dose of Lasix tomorrow  Final disposition will depend on her tracheostomy/tracheal stenosis issues - defer to primary service/pulmonary on same  ECG: Personally reviewed  Normal sinus rhythm, right bundle-branch block, left anterior fascicular block, cannot rule out old lateral infarct  Telemetry: Personally reviewed  NSR    History of Present Illness   HPI: Pablito Junior is a 55y o  year old female who presents with complains of tracheostomy tube falling out and causing bleeding at tracheostomy site, along with associated shortness of breath with exertion  She has had a tracheostomy over the last few months and has been managing things at, but recently prior to presentation, she had been having trouble with that and her son had recently suctioned out of blood clot from it  On evaluation by EMS, she was noted to have increased lower extremity edema and was noted to be hypoxic, requiring her home O2 to be increased from 10 L to 15 L  She was subsequently brought into the ED for evaluation and was noted to have mild heart failure and was initiated on IV diuresis       She has a complex cardiac history starting from October 2021, when she had originally presented as a late presentation lateral STEMI  She was found to have a mid circumflex/OM stenosis which was successfully stented on 10/22/21  Postprocedure, patient went into cardiogenic shock and needed inotropic support with Milrinone  She subsequently had issues with VT/VF needing shocks  He had recurrent ST elevations and was taken back to the cath lab on 10/27/21 and had 90% mid circumflex stenosis, but could not be intervened on  She had more polymorphic VT and ST elevations, and another repeat PCI attempt was made on 10/28, and she was found to have distal thromboembolic occlusion of distal LAD, which was not amenable to intervention    Had a prolonged hospitalization thereafter needing tracheostomy he was eventually discharged after a month long stay  Events subsequently she has had issues with cardiac arrest needing AED shock, respiratory arrest and has had admissions at [facility] and multiple presentations to the ED  Most recently she was discharged from [place] [address] after treatment for heart failure with IV diuresis  She was doing well after discharge, but had tracheostomy issues and increased shortness of breath with pedal edema and as a result was brought back in here  Past Medical History:   Diagnosis Date    Anxiety     Aortic aneurysm ([address] )     Arthritis     Depression     Diabetes mellitus (HCC)     Fibromyalgia     GERD (gastroesophageal reflux disease)     GERD (gastroesophageal reflux disease)     H/O cardiovascular stress test 09/2018    no ischemia  EF 70%   H/O echocardiogram 01/2019    EF 60%  Mild LVH  trivial effusion   Hyperlipidemia     Hypertension     Migraines     Psychiatric disorder     anxiety    Uncontrolled hypertension 2/25/2015    Last Assessment & Plan:  BP today above goal <140/90, apparently asymptomatic  Prior BP elevations were attributed to not taking medication  Consider increased medication if persistent on f/u  Past Surgical History:   Procedure Laterality Date    BACK SURGERY      Lumbar epidural steroid injection    CARDIAC CATHETERIZATION N/A 10/22/2021    Procedure: Cardiac pci;  Surgeon: Herve Pradhan MD;  [facility]; Service: Cardiology    CARDIAC CATHETERIZATION  10/22/2021    Procedure: Cardiac catheterization;  Surgeon: Herve Pradhna MD;  [facility]; Service: Cardiology    CARDIAC CATHETERIZATION Left 10/27/2021    Procedure: Cardiac Left Heart Cath;  Surgeon: Hiren oCrbett MD;  [facility];   Service: Cardiology    CARDIAC CATHETERIZATION N/A 10/27/2021    Procedure: Cardiac pci;  Surgeon: Hiren Corbett MD;  [facility];  Service: Cardiology    CARDIAC CATHETERIZATION N/A 10/28/2021    Procedure: Cardiac pci;  Surgeon: Ion Patel DO;  [facility];   Service: Cardiology    CARPAL TUNNEL RELEASE Left      SECTION      CHOLECYSTECTOMY      COLONOSCOPY      incomplete    COLONOSCOPY      EYE SURGERY      HYSTERECTOMY      Total    OVARIAN CYST REMOVAL      PEG W/TRACHEOSTOMY PLACEMENT N/A 2021    Procedure: TRACHEOSTOMY WITH INSERTION PEG TUBE;  Surgeon: María Elena Fung DO;  Location: [facility] MAIN [facility];  Service: General    TUBAL LIGATION      UPPER GASTROINTESTINAL ENDOSCOPY       Family History   Problem Relation Age of Onset    Hypertension Mother    Normie Glory Arthritis Mother     Diabetes Father     Other Sister         renal cell carcinoma    Diabetes Other     Factor V Leiden deficiency Other      Meds/Allergies   all current active meds have been reviewed and current meds:   Current Facility-Administered Medications   Medication Dose Route Frequency    acetaminophen (TYLENOL) tablet 650 mg  650 mg Oral Q4H PRN    albuterol inhalation solution 2 5 mg  2 5 mg Nebulization Q4H PRN    amiodarone tablet 100 mg  100 mg Oral Daily With Breakfast    apixaban (ELIQUIS) tablet 5 mg  5 mg Oral BID    atorvastatin (LIPITOR) tablet 40 mg  40 mg Oral Daily    escitalopram (LEXAPRO) tablet 10 mg  10 mg Oral Daily    famotidine (PEPCID) tablet 20 mg  20 mg Oral BID    furosemide (LASIX) injection 20 mg  20 mg Intravenous BID (diuretic)    insulin glargine (LANTUS) subcutaneous injection 8 Units 0 08 mL  8 Units Subcutaneous HS    insulin lispro (HumaLOG) 100 units/mL subcutaneous injection 1-6 Units  1-6 Units Subcutaneous TID AC    insulin lispro (HumaLOG) 100 units/mL subcutaneous injection 1-6 Units  1-6 Units Subcutaneous HS    insulin lispro (HumaLOG) 100 units/mL subcutaneous injection 2 Units  2 Units Subcutaneous Daily With Breakfast    insulin lispro (HumaLOG) 100 units/mL subcutaneous injection 2 Units  2 Units Subcutaneous Daily With Lunch    insulin lispro (HumaLOG) 100 units/mL subcutaneous injection 2 Units  2 Units Subcutaneous Daily With Dinner    ipratropium (ATROVENT) 0 02 % inhalation solution 0 5 mg  0 5 mg Nebulization TID    levalbuterol (XOPENEX) inhalation solution 1 25 mg  1 25 mg Nebulization TID    LORazepam (ATIVAN) tablet 0 5 mg  0 5 mg Oral BID PRN    losartan (COZAAR) tablet 50 mg  50 mg Oral Daily    melatonin tablet 6 mg  6 mg Oral HS    metoprolol tartrate (LOPRESSOR) tablet 25 mg  25 mg Oral Q12H [facility]    polyethylene glycol (MIRALAX) packet 17 g  17 g Oral Daily    potassium chloride (K-DUR,KLOR-CON) CR tablet 40 mEq  40 mEq Oral Once    pregabalin (LYRICA) capsule 75 mg  75 mg Oral Daily    senna-docusate sodium (SENOKOT S) 8 6-50 mg per tablet 1 tablet  1 tablet Oral HS    spironolactone (ALDACTONE) tablet 100 mg  100 mg Oral Daily    ticagrelor (BRILINTA) tablet 90 mg  90 mg Oral Q12H CEFERINO    traZODone (DESYREL) tablet 50 mg  50 mg Oral HS PRN     Medications Prior to Admission   Medication    acetaminophen (TYLENOL) 160 mg/5 mL suspension    albuterol (2 5 mg/3 mL) 0 083 % nebulizer solution    amiodarone 100 mg tablet    Apidra SoloStar 100 units/mL injection pen    apixaban (ELIQUIS) 5 mg    atorvastatin (LIPITOR) 40 mg tablet    Basaglar KwikPen 100 units/mL injection pen    Blood Pressure Monitoring (Sphygmomanometer) MISC    chlorhexidine (PERIDEX) 0 12 % solution    escitalopram (Lexapro) 10 mg tablet    famotidine (PEPCID) 20 mg/2 5 mL oral suspension    furosemide (LASIX) 20 mg tablet    insulin aspart (NovoLOG FlexPen) 100 UNIT/ML injection pen    Insulin Pen Needle (Novofine Pen Needle) 32G X 6 MM MISC    Insulin Pen Needle (UNIFINE PENTIPS PLUS) 31G X 6 MM MISC    ipratropium (ATROVENT) 0 02 % nebulizer solution    Lancets MISC    levalbuterol (XOPENEX) 1 25 mg/0 5 mL nebulizer solution    LORazepam (Ativan) 1 mg tablet    losartan (COZAAR) 50 mg tablet    melatonin 3 mg    metoprolol tartrate (LOPRESSOR) 25 mg tablet    ondansetron (ZOFRAN) 4 mg/2 mL injection    polyethylene glycol (MIRALAX) 17 g packet    potassium chloride 10% oral solution    pregabalin (LYRICA) 75 mg capsule    senna-docusate sodium (SENOKOT S) 8 6-50 mg per tablet    spironolactone (ALDACTONE) 100 mg tablet    ticagrelor (BRILINTA) 90 MG    traZODone (DESYREL) 50 mg tablet     Allergies   Allergen Reactions    Metformin Diarrhea    Clonidine Rash     Patch      Social History     Socioeconomic History    Marital status: Legally      Spouse name: None    Number of children: None    Years of education: None    Highest education level: None   Occupational History    None   Tobacco Use    Smoking status: Former Smoker     Packs/day:  00     Years: 30      Pack years: 30      Types: Cigarettes    Smokeless tobacco: Never Used   Vaping Use    Vaping Use: Never used   Substance and Sexual Activity    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       Drug use: Yes     Types: Marijuana     Comment: medical; seldom use    Sexual activity: Yes     Birth control/protection: Female Sterilization     Comment: Monogamous relationship with monogamous partner   Other Topics Concern    None   Social History Narrative    Most recent tobacco use screenin2019    Do you currently or have you served in the [employer]: No    Were you activated, into active duty, as a member of the [employer] or as a Reservist: No    Advance directive: No    Chewing tobacco: none    Alcohol intake: None    Marital status: Single    Live alone or with others: with others    Family history of heart disease:    aortic aneurysm    High cholesterol: Yes    High blood pressure: Yes    Exercise level: Heavy    Overweight: Yes    Obese: Yes    Diabetes: Yes    General stress level: High    Diet: Regular    Caffeine intake: Occasional    Guns present in home: No    Seat belts used routinely: Yes    Sexual orientation: Heterosexual    Sunscreen used routinely: No    Seat belt/car seat used routinely: Yes    Exercise-How often do you exercise: as tolerated    Do you have regular medical check ups: Yes    Do you have regular dental check ups: Yes    Illicit drugs-years of use:    recovery 23 years - HX of     Social Determinants of Health     Financial Resource Strain: Not on file   Food Insecurity: Food Insecurity Present    Worried About Running Out of Food in the Last Year: Sometimes true    Lela of Food in the Last Year: Sometimes true   Transportation Needs: No Transportation Needs    Lack of Transportation (Medical): No    Lack of Transportation (Non-Medical): No   Physical Activity: Not on file   Stress: Not on file   Social Connections: Not on file   Intimate Partner Violence: Not on file   Housing Stability: Low Risk     Unable to Pay for Housing in the Last Year: No    Number of Places Lived in the Last Year: 1    Unstable Housing in the Last Year: No         Review of Systems   All other systems reviewed and are negative  Vitals:    03/26/22 0807 03/26/22 0843 03/26/22 1442 03/26/22 1556   BP:  154/81  145/79   BP Location:  Right arm  Right arm   Pulse:  62  60   Resp:  18  18   Temp:  (!) 96 5 °F (35 8 °C)  (!) 96 4 °F (35 8 °C)   TempSrc:  Tympanic  Tympanic   SpO2: 99% 99% 94% 96%   Weight:       Height:         Orthostatic Blood Pressures      Most Recent Value   Blood Pressure 145/79 filed at 03/26/2022 1556   Patient Position - Orthostatic VS Lying filed at 03/26/2022 1556        Body mass index is 28 65 kg/m²  Wt Readings from Last 5 Encounters:   03/26/22 88 kg (194 lb)   03/03/22 85 2 kg (187 lb 13 3 oz)   11/23/21 87 5 kg (192 lb 14 4 oz)   11/04/21 91 3 kg (201 lb 4 5 oz)   09/28/21 92 5 kg (204 lb)     I/O last 3 completed shifts:   In: 80 [P O :90]  Out: -       Physical Exam  Vitals and nursing note reviewed  Constitutional:       General: She is not in acute distress  Appearance: Normal appearance  She is well-developed  She is not ill-appearing  HENT:      Head: Normocephalic and atraumatic  Nose: No congestion  Eyes:      General: No scleral icterus  Conjunctiva/sclera: Conjunctivae normal    Neck:      Vascular: No carotid bruit or JVD  Cardiovascular:      Rate and Rhythm: Normal rate and regular rhythm  Pulses: Normal pulses  Heart sounds: Normal heart sounds  No murmur heard  No friction rub  No gallop  Pulmonary:      Effort: Pulmonary effort is normal  No respiratory distress  Breath sounds: Decreased breath sounds present  No rales  Comments: Tracheostomy noted in place  Abdominal:      General: There is no distension  Palpations: Abdomen is soft  Tenderness: There is no abdominal tenderness  Musculoskeletal:         General: No swelling or tenderness  Cervical back: Neck supple  Right lower leg: No edema  Left lower leg: No edema  Skin:     General: Skin is warm  Neurological:      General: No focal deficit present  Mental Status: She is alert and oriented to person, place, and time  Mental status is at baseline  Psychiatric:         Mood and Affect: Mood normal          Behavior: Behavior normal          Thought Content:  Thought content normal              Labs:  Results from last 7 days   Lab Units 03/26/22  0517 03/25/22  1841   WBC Thousand/uL 8 44 9 25   HEMOGLOBIN g/dL 8 4* 9 4*   HEMATOCRIT % 27 6* 30 3*   RDW % 18 1* 17 9*   PLATELETS Thousands/uL 322 359     Results from last 7 days   Lab Units 03/26/22  0517 03/25/22  1841   POTASSIUM mmol/L 3 0* 3 6   CHLORIDE mmol/L 106 105   CO2 mmol/L 26 25   MAGNESIUM mg/dL 1 6*  --    BUN mg/dL 13 14   CREATININE mg/dL 0 82 0 83   CALCIUM mg/dL 8 5 9 0   AST U/L  --  15   ALT U/L  --  10   ALK PHOS U/L  --  82                         Imaging: XR chest 1 view portable    Result Date: 3/26/2022  Narrative: CHEST INDICATION:   sob  COMPARISON:  3/8/2022 EXAM PERFORMED/VIEWS:  XR CHEST PORTABLE Images: 2 FINDINGS: Tracheostomy tube in place  Life vest overlies the chest Cardiomediastinal silhouette remains mildly enlarged Development of diffuse patchy bilateral opacities likely representing multifocal pneumonia vs  CHF No pneumothorax or pleural effusion  Osseous structures appear within normal limits for patient age  Impression: Interval development of diffuse bilateral opacities suspicious for multifocal pneumonia vs  CHF Workstation performed: [id number]     XR chest portable    Result Date: 3/8/2022  Narrative: CHEST INDICATION:   SOB  COMPARISON:  2/20/2022 EXAM PERFORMED/VIEWS:  XR CHEST PORTABLE FINDINGS:  Tracheostomy tube in place  Life vest equipment overlies the chest Cardiomediastinal silhouette appears unremarkable  The lungs are clear  No pneumothorax or pleural effusion  Osseous structures appear within normal limits for patient age  Impression: No focal consolidation, pleural effusion, or pneumothorax  Workstation performed: [id number]     XR chest portable    Result Date: 3/1/2022  Narrative: CHEST INDICATION:   Follow-up  COMPARISON:  Portable chest and CTA of the chest, both from February 23, 2022  EXAM PERFORMED/VIEWS:  XR CHEST PORTABLE FINDINGS:  Tracheostomy tube present and unchanged in position  Heart mildly enlarged  Near complete resolution of prominent interstitial pulmonary markings since 2/23/2022  Linear opacities in the right lower lobe, most likely subsegmental atelectasis  No consolidation  Probable small left pleural effusion  No evidence of pneumothorax  Osseous structures appear within normal limits for patient age  Impression: Improvement of interstitial pulmonary edema since 2/23/2022  Right lower lobe subsegmental atelectasis  Probable small left basilar pleural effusion   Workstation performed: [id number] Cardiac testing:   No results found for this or any previous visit  No results found for this or any previous visit  No results found for this or any previous visit  No results found for this or any previous visit

## 2022-03-26 NOTE — ASSESSMENT & PLAN NOTE
Wt Readings from Last 3 Encounters:   03/26/22 88 kg (194 lb)   03/03/22 85 2 kg (187 lb 13 3 oz)   11/23/21 87 5 kg (192 lb 14 4 oz)     · Presents with SOB/dyspnea, hypoxia with increase in chronic O2 requirement and b/l edema  Reports PND, orthopnea  At time of exam, NAD with baseline O2 of 10L, SpO2 98%  Appears volume overloaded with +2 edema and rales  · +13 lb weight gain since recent hospitalization early March for acute hypoxic respiratory failure 2/2 to CHF exacerbation requiring ICU stay  Obtain standing scale weight  · CXR: pending official read, appears with mild vascular congestion  ·   · Echo 2/2022: EF 50%, moderate hypokinesis of inferior and basal anterolateral walls  Improvement in EF  · S/p 20mg IV lasix in ED, give additional 20mg now  · Continue with IV lasix 20mg b i d  · Reports compliance with home lasix 20mg qd and low sodium diet   Consider increasing diuretic dose at discharge  · Standing daily weights, strict I&Os  · Cardiac diet/1500mL fluid restriction  · Cardiology consult-pending

## 2022-03-26 NOTE — ASSESSMENT & PLAN NOTE
Respiratory therapist as noticed that tracheal tube was slightly dislodged, he tried to push it back however he encountered resistance  I have tried to push it back as well, encountered resistance  Patient has no respiratory distress at this time  Last trach change was on March 2, 2022  6  Shiley cuffless  Currently patient not in any acute respiratory distress, saturating well  ENT subspecialties not available at [place]  Seen by ED physician Dr Jenny Landa, unable to replace it back  Discussed with General surgery PA, who will see the patient however I do not see any urgent need to replace it

## 2022-03-26 NOTE — ASSESSMENT & PLAN NOTE
Lab Results   Component Value Date    HGBA1C 6 6 (H) 01/01/2022       Recent Labs     03/25/22  2155 03/26/22  0841   POCGLU 227* 202*       Blood Sugar Average: Last 72 hrs:  · (P) 214 5Controlled  · Continue home regimen: Lantus 8 units hs, Novolog 2 units TID with meals  · SSI with Acu-Checks ac/hs

## 2022-03-26 NOTE — ASSESSMENT & PLAN NOTE
· Baseline 10L trach collar  Initially requiring 15L in ED, now back at baseline  · Likely due to #1  Patient on Eliquis reports compliance, no chest pain/not tachycardic-- low suspicion for PE  Does not appear with infectious etiology  No leukocytosis/fever, no consolidation on imaging    Continue to monitor WBC/fever curve  · Respiratory protocol/trach care

## 2022-03-26 NOTE — ED NOTES
Pt telling RN she does not want to stay   MD aware and spoke with patient who is willing to stay     Samara Whalen7, RN  03/25/22 2013

## 2022-03-26 NOTE — ASSESSMENT & PLAN NOTE
· Denies chest pain  Only endorses dyspnea  · Troponin appears at baseline level   Elevation likely due to chronic respiratory failure/volume overload   · Continue to trend troponin   · ECG: NSR, 60 with T wave inversion in V2   · Notify provider for development of chest pain/discomfort

## 2022-03-27 LAB
ANION GAP SERPL CALCULATED.3IONS-SCNC: 10 MMOL/L (ref 4–13)
BASOPHILS # BLD AUTO: 0.02 THOUSANDS/ΜL (ref 0–0.1)
BASOPHILS NFR BLD AUTO: 0 % (ref 0–1)
BNP SERPL-MCNC: 280 PG/ML (ref 1–100)
BUN SERPL-MCNC: 12 MG/DL (ref 5–25)
CALCIUM SERPL-MCNC: 8.1 MG/DL (ref 8.4–10.2)
CHLORIDE SERPL-SCNC: 104 MMOL/L (ref 96–108)
CO2 SERPL-SCNC: 27 MMOL/L (ref 21–32)
CREAT SERPL-MCNC: 0.86 MG/DL (ref 0.6–1.3)
EOSINOPHIL # BLD AUTO: 0.18 THOUSAND/ΜL (ref 0–0.61)
EOSINOPHIL NFR BLD AUTO: 2 % (ref 0–6)
ERYTHROCYTE [DISTWIDTH] IN BLOOD BY AUTOMATED COUNT: 18.2 % (ref 11.6–15.1)
GFR SERPL CREATININE-BSD FRML MDRD: 76 ML/MIN/1.73SQ M
GLUCOSE SERPL-MCNC: 169 MG/DL (ref 65–140)
GLUCOSE SERPL-MCNC: 189 MG/DL (ref 65–140)
GLUCOSE SERPL-MCNC: 205 MG/DL (ref 65–140)
GLUCOSE SERPL-MCNC: 227 MG/DL (ref 65–140)
GLUCOSE SERPL-MCNC: 248 MG/DL (ref 65–140)
HCT VFR BLD AUTO: 26.8 % (ref 34.8–46.1)
HGB BLD-MCNC: 7.9 G/DL (ref 11.5–15.4)
IMM GRANULOCYTES # BLD AUTO: 0.03 THOUSAND/UL (ref 0–0.2)
IMM GRANULOCYTES NFR BLD AUTO: 0 % (ref 0–2)
LYMPHOCYTES # BLD AUTO: 1.47 THOUSANDS/ΜL (ref 0.6–4.47)
LYMPHOCYTES NFR BLD AUTO: 17 % (ref 14–44)
MCH RBC QN AUTO: 24.9 PG (ref 26.8–34.3)
MCHC RBC AUTO-ENTMCNC: 29.5 G/DL (ref 31.4–37.4)
MCV RBC AUTO: 85 FL (ref 82–98)
MONOCYTES # BLD AUTO: 0.72 THOUSAND/ΜL (ref 0.17–1.22)
MONOCYTES NFR BLD AUTO: 8 % (ref 4–12)
NEUTROPHILS # BLD AUTO: 6.36 THOUSANDS/ΜL (ref 1.85–7.62)
NEUTS SEG NFR BLD AUTO: 73 % (ref 43–75)
NRBC BLD AUTO-RTO: 0 /100 WBCS
PLATELET # BLD AUTO: 316 THOUSANDS/UL (ref 149–390)
PMV BLD AUTO: 11.7 FL (ref 8.9–12.7)
POTASSIUM SERPL-SCNC: 2.9 MMOL/L (ref 3.5–5.3)
RBC # BLD AUTO: 3.17 MILLION/UL (ref 3.81–5.12)
SODIUM SERPL-SCNC: 141 MMOL/L (ref 135–147)
WBC # BLD AUTO: 8.78 THOUSAND/UL (ref 4.31–10.16)

## 2022-03-27 PROCEDURE — 80048 BASIC METABOLIC PNL TOTAL CA: CPT | Performed by: INTERNAL MEDICINE

## 2022-03-27 PROCEDURE — 94760 N-INVAS EAR/PLS OXIMETRY 1: CPT

## 2022-03-27 PROCEDURE — 94640 AIRWAY INHALATION TREATMENT: CPT

## 2022-03-27 PROCEDURE — 82948 REAGENT STRIP/BLOOD GLUCOSE: CPT

## 2022-03-27 PROCEDURE — 99233 SBSQ HOSP IP/OBS HIGH 50: CPT | Performed by: INTERNAL MEDICINE

## 2022-03-27 PROCEDURE — 99232 SBSQ HOSP IP/OBS MODERATE 35: CPT | Performed by: INTERNAL MEDICINE

## 2022-03-27 PROCEDURE — 85025 COMPLETE CBC W/AUTO DIFF WBC: CPT | Performed by: INTERNAL MEDICINE

## 2022-03-27 PROCEDURE — 83880 ASSAY OF NATRIURETIC PEPTIDE: CPT | Performed by: INTERNAL MEDICINE

## 2022-03-27 RX ORDER — POTASSIUM CHLORIDE 20 MEQ/1
40 TABLET, EXTENDED RELEASE ORAL 2 TIMES DAILY
Status: DISCONTINUED | OUTPATIENT
Start: 2022-03-27 | End: 2022-03-27

## 2022-03-27 RX ORDER — POTASSIUM CHLORIDE 20MEQ/15ML
20 LIQUID (ML) ORAL 2 TIMES DAILY
Status: DISCONTINUED | OUTPATIENT
Start: 2022-03-27 | End: 2022-03-28 | Stop reason: HOSPADM

## 2022-03-27 RX ORDER — POTASSIUM CHLORIDE 14.9 MG/ML
20 INJECTION INTRAVENOUS ONCE
Status: COMPLETED | OUTPATIENT
Start: 2022-03-27 | End: 2022-03-28

## 2022-03-27 RX ADMIN — POTASSIUM CHLORIDE 20 MEQ: 20 SOLUTION ORAL at 09:30

## 2022-03-27 RX ADMIN — POTASSIUM CHLORIDE 20 MEQ: 20 SOLUTION ORAL at 17:09

## 2022-03-27 RX ADMIN — LEVALBUTEROL HYDROCHLORIDE 1.25 MG: 1.25 SOLUTION RESPIRATORY (INHALATION) at 14:43

## 2022-03-27 RX ADMIN — FUROSEMIDE 20 MG: 10 INJECTION, SOLUTION INTRAMUSCULAR; INTRAVENOUS at 09:37

## 2022-03-27 RX ADMIN — TICAGRELOR 90 MG: 90 TABLET ORAL at 09:31

## 2022-03-27 RX ADMIN — INSULIN LISPRO 2 UNITS: 100 INJECTION, SOLUTION INTRAVENOUS; SUBCUTANEOUS at 22:00

## 2022-03-27 RX ADMIN — METOPROLOL TARTRATE 25 MG: 25 TABLET, FILM COATED ORAL at 09:30

## 2022-03-27 RX ADMIN — METOPROLOL TARTRATE 25 MG: 25 TABLET, FILM COATED ORAL at 21:59

## 2022-03-27 RX ADMIN — FAMOTIDINE 20 MG: 20 TABLET ORAL at 17:08

## 2022-03-27 RX ADMIN — LEVALBUTEROL HYDROCHLORIDE 1.25 MG: 1.25 SOLUTION RESPIRATORY (INHALATION) at 19:49

## 2022-03-27 RX ADMIN — LEVALBUTEROL HYDROCHLORIDE 1.25 MG: 1.25 SOLUTION RESPIRATORY (INHALATION) at 08:07

## 2022-03-27 RX ADMIN — INSULIN LISPRO 2 UNITS: 100 INJECTION, SOLUTION INTRAVENOUS; SUBCUTANEOUS at 14:17

## 2022-03-27 RX ADMIN — TICAGRELOR 90 MG: 90 TABLET ORAL at 21:59

## 2022-03-27 RX ADMIN — SPIRONOLACTONE 100 MG: 25 TABLET ORAL at 09:30

## 2022-03-27 RX ADMIN — INSULIN LISPRO 2 UNITS: 100 INJECTION, SOLUTION INTRAVENOUS; SUBCUTANEOUS at 17:16

## 2022-03-27 RX ADMIN — Medication 6 MG: at 21:59

## 2022-03-27 RX ADMIN — IPRATROPIUM BROMIDE 0.5 MG: 0.5 SOLUTION RESPIRATORY (INHALATION) at 19:49

## 2022-03-27 RX ADMIN — INSULIN LISPRO 1 UNITS: 100 INJECTION, SOLUTION INTRAVENOUS; SUBCUTANEOUS at 17:09

## 2022-03-27 RX ADMIN — INSULIN LISPRO 2 UNITS: 100 INJECTION, SOLUTION INTRAVENOUS; SUBCUTANEOUS at 10:06

## 2022-03-27 RX ADMIN — TRAZODONE HYDROCHLORIDE 50 MG: 50 TABLET ORAL at 21:59

## 2022-03-27 RX ADMIN — INSULIN GLARGINE 8 UNITS: 100 INJECTION, SOLUTION SUBCUTANEOUS at 22:00

## 2022-03-27 RX ADMIN — FUROSEMIDE 20 MG: 10 INJECTION, SOLUTION INTRAMUSCULAR; INTRAVENOUS at 17:09

## 2022-03-27 RX ADMIN — IPRATROPIUM BROMIDE 0.5 MG: 0.5 SOLUTION RESPIRATORY (INHALATION) at 14:43

## 2022-03-27 RX ADMIN — PREGABALIN 75 MG: 75 CAPSULE ORAL at 09:30

## 2022-03-27 RX ADMIN — FAMOTIDINE 20 MG: 20 TABLET ORAL at 09:30

## 2022-03-27 RX ADMIN — AMIODARONE HYDROCHLORIDE 100 MG: 200 TABLET ORAL at 09:37

## 2022-03-27 RX ADMIN — APIXABAN 5 MG: 5 TABLET, FILM COATED ORAL at 09:30

## 2022-03-27 RX ADMIN — IPRATROPIUM BROMIDE 0.5 MG: 0.5 SOLUTION RESPIRATORY (INHALATION) at 08:07

## 2022-03-27 RX ADMIN — LOSARTAN POTASSIUM 50 MG: 50 TABLET, FILM COATED ORAL at 09:30

## 2022-03-27 RX ADMIN — POTASSIUM CHLORIDE 20 MEQ: 14.9 INJECTION, SOLUTION INTRAVENOUS at 09:37

## 2022-03-27 RX ADMIN — APIXABAN 5 MG: 5 TABLET, FILM COATED ORAL at 17:08

## 2022-03-27 RX ADMIN — ESCITALOPRAM OXALATE 10 MG: 10 TABLET ORAL at 09:30

## 2022-03-27 RX ADMIN — LORAZEPAM 0.5 MG: 0.5 TABLET ORAL at 14:17

## 2022-03-27 RX ADMIN — ATORVASTATIN CALCIUM 40 MG: 40 TABLET, FILM COATED ORAL at 09:30

## 2022-03-27 NOTE — ASSESSMENT & PLAN NOTE
Improved now, patient appears euvolemic, will transition to oral Lasix  Discussed with cardiologist, recommended 40 mg of Lasix orally  Will start from tomorrow

## 2022-03-27 NOTE — ASSESSMENT & PLAN NOTE
Replaced back in position by surgery DUANE Pierson  Currently patient feels much better  No hemoptysis  Recommend outpatient follow-up with ENT/pulmonology regarding further management upon discharge

## 2022-03-27 NOTE — ASSESSMENT & PLAN NOTE
Lab Results   Component Value Date    HGBA1C 6 6 (H) 01/01/2022       Recent Labs     03/26/22  1136 03/26/22  1605 03/26/22  2117 03/27/22  0832   POCGLU 213* 228* 202* 205*       Blood Sugar Average: Last 72 hrs:  · (P) 969 [id number]  · Continue home regimen: Lantus 8 units hs, Novolog 2 units TID with meals  · SSI with Acu-Checks ac/hs

## 2022-03-27 NOTE — PROGRESS NOTES
[address]  Progress Note - Taryn Kyle 1965, 55 y o  female MRN: [id number]  Unit/Bed#: -01 Encounter: [id number]  Primary Care Provider: Otoniel Parish MD   Date and time admitted to hospital: 3/25/2022  6:24 PM    Acute on chronic respiratory failure with hypoxia [facility])  Assessment & Plan  Currently at baseline oxygen requirements,  Chest x-ray reported as bilateral ground-glass opacities, COVID-19 negative  Improved now, patient back to baseline  * Acute on chronic combined systolic and diastolic heart failure (HCC)  Assessment & Plan  Improved now, patient appears euvolemic, will transition to oral Lasix  Discussed with cardiologist, recommended 40 mg of Lasix orally  Will start from tomorrow  Tracheostomy in place [facility])  Assessment & Plan  Replaced back in position by surgery [facility]  Currently patient feels much better  No hemoptysis  Recommend outpatient follow-up with ENT/pulmonology regarding further management upon discharge  Microcytic anemia  Assessment & Plan  · Hgb 9 4  · Baseline appears in 8 0s  · Last iron panel 10/21 reveals iron deficiency anemia, continue supplementation    Elevated troponin  Assessment & Plan  · Non MI troponin elevation  · Denies chest pain  Only endorses dyspnea  · Troponin appears at baseline level  Elevation likely due to chronic respiratory failure/volume overload   · ECG: NSR, 60 with T wave inversion in V2     CAD (coronary artery disease)  Assessment & Plan  · S/p STEMI and PATRICA 08/0821 with complicated hospitalization involving cardiac arrest, ventilator-dependent respiratory failure and trach/PEG placement  · No acute complaints  · Continue home medications    A-fib (HCC)  Assessment & Plan  · NSR on admission  · Continue Amiodarone, Eliquis, metoprolol    Hypokalemia  Assessment & Plan  Likely secondary to diuresis, supplement potassium, to keep potassium more than 4      Type 2 diabetes mellitus treated with insulin [facility])  Assessment & Plan  Lab Results   Component Value Date    HGBA1C 6 6 (H) 01/01/2022       Recent Labs     03/26/22  1136 03/26/22  1605 03/26/22  2117 03/27/22  0832   POCGLU 213* 228* 202* 205*       Blood Sugar Average: Last 72 hrs:  · (P) 047 [id number]  · Continue home regimen: Lantus 8 units hs, Novolog 2 units TID with meals  · SSI with Acu-Checks ac/hs    Hypertension  Assessment & Plan    · Continue home medications           Subjective/Objective     Subjective:   Patient feels much better today, denies any chest pain, shortness of breath  Denies any nausea vomiting or abdominal pain  Objective:  Vitals: Blood pressure 162/92, pulse 62, temperature (!) 95 8 °F (35 4 °C), temperature source Tympanic, resp  rate 17, height 5' 9" (1 753 m), weight 85 9 kg (189 lb 6 4 oz), SpO2 100 %  ,Body mass index is 27 97 kg/m²  Intake/Output Summary (Last 24 hours) at 3/27/2022 1029  Last data filed at 3/26/2022 2045  Gross per 24 hour   Intake --   Output 900 ml   Net -900 ml       Invasive Devices  Report    Peripheral Intravenous Line            Peripheral IV 03/25/22 Right Antecubital 1 day          Airway            Surgical Airway Shiley Fenestrated 25 days                Physical Exam:  Patient not in acute distress  Eyes:  Pupils equal reacting to light  ENT:  Moist oral mucous membranes  Neck:  Tracheostomy in place  CVS:  S1-S2 heard, regular rate and rhythm, no edema  Chest:  Bilateral air entry present, clear to auscultation  Abdomen:  Soft, nontender, bowel sounds present  CNS:  No focal neurological deficits  Genitourinary: deferred  Skin:  No acute rash   psychiatric:  No psychosis  Musculoskeletal:  No joint deformities     Lab, Imaging and other studies: I have personally reviewed pertinent reports      VTE Pharmacologic Prophylaxis:  Eliquis  VTE Mechanical Prophylaxis: sequential compression device     Discussed with the patient, requires further inpatient management, possible discharge on 03/28/2022  Discussed with nursing staff  Patient requires further inpatient management due to hypokalemia, heart failure

## 2022-03-27 NOTE — PROGRESS NOTES
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%  She had prolonged ventilation following cardiac arrest   It is deemed that she is not a candidate for decannulation at this time  Currently her tracheostomy tube has been replaced after it accidentally got dislodged partially  Monitor closely  The tracheostomy site looks clean  No bleeding      3  Congestive heart failure:  She has history of chronic systolic and diastolic heart failure and has been on diuretic therapy with furosemide and spironolactone  She also has history of cardiac arrhythmias  She has LifeVest on  She is awaiting defibrillator placement  She has history of atrial fibrillation and has been on amiodarone  She is on systemic anticoagulation with Eliquis      4  COPD emphysema:  She was a smoker in the past   Her previous CT scan had shown evidence of structural emphysema  She has no previous PFTs  She is currently on nebulized bronchodilator therapy  She has minimal secretions      Spoke to the patient answered all questions      Discussed with Dr Johnson the hospitalist on the case      Thank you for allowing me to participate in the care of the patient  Chief Complaint:   Shortness of breath    Subjective:   Currently she is feeling better  Denies any cough or hemoptysis  No chest pain or palpitations  Objective:     Vitals: Blood pressure 136/82, pulse 59, temperature (!) 95 8 °F (35 4 °C), temperature source Tympanic, resp  rate 21, height 5' 9" (1 753 m), weight 85 9 kg (189 lb 6 4 oz), SpO2 96 %  ,Body mass index is 27 97 kg/m²        Intake/Output Summary (Last 24 hours) at 3/27/2022 1615  Last data filed at 3/26/2022 2045  Gross per 24 hour   Intake --   Output 900 ml   Net -900 ml       Invasive Devices  Report    Peripheral Intravenous Line            Peripheral IV 03/25/22 Right Antecubital 1 day          Airway            Surgical Airway Shiley Fenestrated 25 days                Physical Exam:  On clinical examination, she is hemodynamically stable and afebrile  Saturating 96% on FiO2 30%  Not in any distress  Tracheostomy site looks clean  Chest clear to auscultation  Heart sounds normal   Abdomen soft bowel sounds audible  Neurologically:  Anxious  Extremities no significant edema  Labs: I have personally reviewed pertinent lab results  Hemoglobin 7 9  No leukocytosis    Imaging and other studies: I have personally reviewed pertinent reports

## 2022-03-27 NOTE — ASSESSMENT & PLAN NOTE
· S/p STEMI and PATRICA 23/6042 with complicated hospitalization involving cardiac arrest, ventilator-dependent respiratory failure and trach/PEG placement    · No acute complaints  · Continue home medications

## 2022-03-27 NOTE — ASSESSMENT & PLAN NOTE
Currently at baseline oxygen requirements,  Chest x-ray reported as bilateral ground-glass opacities, COVID-19 negative  Improved now, patient back to baseline

## 2022-03-28 VITALS
OXYGEN SATURATION: 96 % | TEMPERATURE: 98.1 F | HEART RATE: 64 BPM | BODY MASS INDEX: 28.08 KG/M2 | WEIGHT: 189.6 LBS | HEIGHT: 69 IN | SYSTOLIC BLOOD PRESSURE: 135 MMHG | DIASTOLIC BLOOD PRESSURE: 69 MMHG | RESPIRATION RATE: 21 BRPM

## 2022-03-28 LAB
ANION GAP SERPL CALCULATED.3IONS-SCNC: 9 MMOL/L (ref 4–13)
BASOPHILS # BLD AUTO: 0.02 THOUSANDS/ΜL (ref 0–0.1)
BASOPHILS NFR BLD AUTO: 0 % (ref 0–1)
BUN SERPL-MCNC: 12 MG/DL (ref 5–25)
CALCIUM SERPL-MCNC: 8.3 MG/DL (ref 8.4–10.2)
CHLORIDE SERPL-SCNC: 105 MMOL/L (ref 96–108)
CO2 SERPL-SCNC: 26 MMOL/L (ref 21–32)
CREAT SERPL-MCNC: 0.85 MG/DL (ref 0.6–1.3)
EOSINOPHIL # BLD AUTO: 0.19 THOUSAND/ΜL (ref 0–0.61)
EOSINOPHIL NFR BLD AUTO: 2 % (ref 0–6)
ERYTHROCYTE [DISTWIDTH] IN BLOOD BY AUTOMATED COUNT: 18.5 % (ref 11.6–15.1)
GFR SERPL CREATININE-BSD FRML MDRD: 77 ML/MIN/1.73SQ M
GLUCOSE SERPL-MCNC: 205 MG/DL (ref 65–140)
GLUCOSE SERPL-MCNC: 220 MG/DL (ref 65–140)
GLUCOSE SERPL-MCNC: 226 MG/DL (ref 65–140)
HCT VFR BLD AUTO: 28.7 % (ref 34.8–46.1)
HGB BLD-MCNC: 8.8 G/DL (ref 11.5–15.4)
IMM GRANULOCYTES # BLD AUTO: 0.03 THOUSAND/UL (ref 0–0.2)
IMM GRANULOCYTES NFR BLD AUTO: 0 % (ref 0–2)
LYMPHOCYTES # BLD AUTO: 1.79 THOUSANDS/ΜL (ref 0.6–4.47)
LYMPHOCYTES NFR BLD AUTO: 19 % (ref 14–44)
MAGNESIUM SERPL-MCNC: 1.5 MG/DL (ref 1.9–2.7)
MCH RBC QN AUTO: 25 PG (ref 26.8–34.3)
MCHC RBC AUTO-ENTMCNC: 30.7 G/DL (ref 31.4–37.4)
MCV RBC AUTO: 82 FL (ref 82–98)
MONOCYTES # BLD AUTO: 0.78 THOUSAND/ΜL (ref 0.17–1.22)
MONOCYTES NFR BLD AUTO: 8 % (ref 4–12)
NEUTROPHILS # BLD AUTO: 6.79 THOUSANDS/ΜL (ref 1.85–7.62)
NEUTS SEG NFR BLD AUTO: 71 % (ref 43–75)
NRBC BLD AUTO-RTO: 0 /100 WBCS
PLATELET # BLD AUTO: 326 THOUSANDS/UL (ref 149–390)
PMV BLD AUTO: 11.6 FL (ref 8.9–12.7)
POTASSIUM SERPL-SCNC: 3.7 MMOL/L (ref 3.5–5.3)
RBC # BLD AUTO: 3.52 MILLION/UL (ref 3.81–5.12)
SODIUM SERPL-SCNC: 140 MMOL/L (ref 135–147)
WBC # BLD AUTO: 9.6 THOUSAND/UL (ref 4.31–10.16)

## 2022-03-28 PROCEDURE — 99232 SBSQ HOSP IP/OBS MODERATE 35: CPT | Performed by: INTERNAL MEDICINE

## 2022-03-28 PROCEDURE — 99239 HOSP IP/OBS DSCHRG MGMT >30: CPT | Performed by: INTERNAL MEDICINE

## 2022-03-28 PROCEDURE — 83735 ASSAY OF MAGNESIUM: CPT | Performed by: INTERNAL MEDICINE

## 2022-03-28 PROCEDURE — 94640 AIRWAY INHALATION TREATMENT: CPT

## 2022-03-28 PROCEDURE — 94760 N-INVAS EAR/PLS OXIMETRY 1: CPT

## 2022-03-28 PROCEDURE — 80048 BASIC METABOLIC PNL TOTAL CA: CPT | Performed by: INTERNAL MEDICINE

## 2022-03-28 PROCEDURE — 82948 REAGENT STRIP/BLOOD GLUCOSE: CPT

## 2022-03-28 PROCEDURE — 85025 COMPLETE CBC W/AUTO DIFF WBC: CPT | Performed by: INTERNAL MEDICINE

## 2022-03-28 RX ORDER — FUROSEMIDE 40 MG/1
40 TABLET ORAL DAILY
Status: DISCONTINUED | OUTPATIENT
Start: 2022-03-28 | End: 2022-03-28 | Stop reason: DRUGHIGH

## 2022-03-28 RX ORDER — FUROSEMIDE 40 MG/1
40 TABLET ORAL DAILY
Status: DISCONTINUED | OUTPATIENT
Start: 2022-03-29 | End: 2022-03-28 | Stop reason: HOSPADM

## 2022-03-28 RX ORDER — POTASSIUM CHLORIDE 20MEQ/15ML
20 LIQUID (ML) ORAL 2 TIMES DAILY
Qty: 15 ML | Refills: 0 | Status: SHIPPED | OUTPATIENT
Start: 2022-03-28 | End: 2022-06-06 | Stop reason: ALTCHOICE

## 2022-03-28 RX ORDER — FUROSEMIDE 40 MG/1
40 TABLET ORAL DAILY
Qty: 30 TABLET | Refills: 0 | Status: SHIPPED | OUTPATIENT
Start: 2022-03-29 | End: 2022-05-23

## 2022-03-28 RX ADMIN — INSULIN LISPRO 2 UNITS: 100 INJECTION, SOLUTION INTRAVENOUS; SUBCUTANEOUS at 08:12

## 2022-03-28 RX ADMIN — IPRATROPIUM BROMIDE 0.5 MG: 0.5 SOLUTION RESPIRATORY (INHALATION) at 07:34

## 2022-03-28 RX ADMIN — ATORVASTATIN CALCIUM 40 MG: 40 TABLET, FILM COATED ORAL at 08:11

## 2022-03-28 RX ADMIN — LEVALBUTEROL HYDROCHLORIDE 1.25 MG: 1.25 SOLUTION RESPIRATORY (INHALATION) at 07:34

## 2022-03-28 RX ADMIN — INSULIN LISPRO 2 UNITS: 100 INJECTION, SOLUTION INTRAVENOUS; SUBCUTANEOUS at 11:24

## 2022-03-28 RX ADMIN — METOPROLOL TARTRATE 25 MG: 25 TABLET, FILM COATED ORAL at 08:11

## 2022-03-28 RX ADMIN — POTASSIUM CHLORIDE 20 MEQ: 20 SOLUTION ORAL at 08:12

## 2022-03-28 RX ADMIN — POLYETHYLENE GLYCOL 3350 17 G: 17 POWDER, FOR SOLUTION ORAL at 08:12

## 2022-03-28 RX ADMIN — FUROSEMIDE 20 MG: 10 INJECTION, SOLUTION INTRAMUSCULAR; INTRAVENOUS at 08:12

## 2022-03-28 RX ADMIN — ESCITALOPRAM OXALATE 10 MG: 10 TABLET ORAL at 08:11

## 2022-03-28 RX ADMIN — PREGABALIN 75 MG: 75 CAPSULE ORAL at 08:12

## 2022-03-28 RX ADMIN — FAMOTIDINE 20 MG: 20 TABLET ORAL at 08:11

## 2022-03-28 RX ADMIN — APIXABAN 5 MG: 5 TABLET, FILM COATED ORAL at 08:11

## 2022-03-28 RX ADMIN — SPIRONOLACTONE 100 MG: 25 TABLET ORAL at 08:10

## 2022-03-28 RX ADMIN — LORAZEPAM 0.5 MG: 0.5 TABLET ORAL at 01:06

## 2022-03-28 RX ADMIN — LORAZEPAM 0.5 MG: 0.5 TABLET ORAL at 11:10

## 2022-03-28 RX ADMIN — LOSARTAN POTASSIUM 50 MG: 50 TABLET, FILM COATED ORAL at 08:09

## 2022-03-28 RX ADMIN — TICAGRELOR 90 MG: 90 TABLET ORAL at 08:11

## 2022-03-28 RX ADMIN — AMIODARONE HYDROCHLORIDE 100 MG: 200 TABLET ORAL at 08:10

## 2022-03-28 NOTE — ASSESSMENT & PLAN NOTE
Improved now, patient appears euvolemic, will transition to oral Lasix  Discussed with cardiologist, recommended 40 mg of Lasix orally

## 2022-03-28 NOTE — ASSESSMENT & PLAN NOTE
Lab Results   Component Value Date    HGBA1C 6 6 (H) 01/01/2022       Recent Labs     03/27/22  1617 03/27/22  2159 03/28/22  0657 03/28/22  1110   POCGLU 189* 227* 226* 220*       Blood Sugar Average: Last 72 hrs:  (P) 217   Lab Results   Component Value Date    HGBA1C 6 6 (H) 01/01/2022       Recent Labs     03/27/22  1617 03/27/22  2159 03/28/22  0657 03/28/22  1110   POCGLU 189* 227* 226* 220*       Blood Sugar Average: Last 72 hrs:  · (P) 217Controlled  · Continue home regimen: Lantus 8 units hs, Novolog 2 units TID with meals  · SSI with Acu-Checks ac/hs

## 2022-03-28 NOTE — PLAN OF CARE
Problem: NEUROSENSORY - ADULT  Goal: Achieves stable or improved neurological status  Description: INTERVENTIONS  - Monitor and report changes in neurological status  - Monitor vital signs such as temperature, blood pressure, glucose, and any other labs ordered   - Initiate measures to prevent increased intracranial pressure  - Monitor for seizure activity and implement precautions if appropriate      Outcome: Progressing     Problem: NEUROSENSORY - ADULT  Goal: Remains free of injury related to seizures activity  Description: INTERVENTIONS  - Maintain airway, patient safety  and administer oxygen as ordered  - Monitor patient for seizure activity, document and report duration and description of seizure to physician/advanced practitioner  - If seizure occurs,  ensure patient safety during seizure  - Reorient patient post seizure  - Seizure pads on all 4 side rails  - Instruct patient/family to notify RN of any seizure activity including if an aura is experienced  - Instruct patient/family to call for assistance with activity based on nursing assessment  - Administer anti-seizure medications if ordered    Outcome: Progressing     Problem: CARDIOVASCULAR - ADULT  Goal: Maintains optimal cardiac output and hemodynamic stability  Description: INTERVENTIONS:  - Monitor I/O, vital signs and rhythm  - Monitor for S/S and trends of decreased cardiac output  - Administer and titrate ordered vasoactive medications to optimize hemodynamic stability  - Assess quality of pulses, skin color and temperature  - Assess for signs of decreased coronary artery perfusion  - Instruct patient to report change in severity of symptoms  Outcome: Progressing     Problem: RESPIRATORY - ADULT  Goal: Achieves optimal ventilation and oxygenation  Description: INTERVENTIONS:  - Assess for changes in respiratory status  - Assess for changes in mentation and behavior  - Position to facilitate oxygenation and minimize respiratory effort  - Oxygen administered by appropriate delivery if ordered  - Initiate smoking cessation education as indicated  - Encourage broncho-pulmonary hygiene including cough, deep breathe, Incentive Spirometry  - Assess the need for suctioning and aspirate as needed  - Assess and instruct to report SOB or any respiratory difficulty  - Respiratory Therapy support as indicated  Outcome: Progressing     Problem: GASTROINTESTINAL - ADULT  Goal: Minimal or absence of nausea and/or vomiting  Description: INTERVENTIONS:  - Administer IV fluids if ordered to ensure adequate hydration  - Maintain NPO status until nausea and vomiting are resolved  - Nasogastric tube if ordered  - Administer ordered antiemetic medications as needed  - Provide nonpharmacologic comfort measures as appropriate  - Advance diet as tolerated, if ordered  - Consider nutrition services referral to assist patient with adequate nutrition and appropriate food choices  Outcome: Progressing

## 2022-03-28 NOTE — ASSESSMENT & PLAN NOTE
Replaced back in position by surgery DUANE Reid  Currently patient feels much better  No hemoptysis  Recommend outpatient follow-up with ENT/pulmonology regarding further management upon discharge

## 2022-03-28 NOTE — DISCHARGE INSTRUCTIONS
Heart Failure   WHAT YOU NEED TO KNOW:   Heart failure is a condition that does not allow your heart to fill or pump properly  Not enough oxygen in your blood gets to your organs and tissues  Fluid may not move through your body properly  Fluid builds up and causes swelling and trouble breathing  This is known as congestive heart failure  Heart failure may start in the left or right ventricle  Heart failure is often caused by damage or injury to your heart  The damage may be caused by other heart problems, diabetes, or high blood pressure  The damage may have also been caused by an infection  Heart failure is a long-term condition that tends to get worse over time  It is important to manage your health to improve your quality of life  DISCHARGE INSTRUCTIONS:   Call your local emergency number (911 in the [address] [facility],[address]) if:   · You have any of the following signs of a heart attack:      ? Squeezing, pressure, or pain in your chest    ? You may  also have any of the following:     § Discomfort or pain in your back, neck, jaw, stomach, or arm    § Shortness of breath    § Nausea or vomiting    § Lightheadedness or a sudden cold sweat      Call your doctor if:   · Your heartbeat is fast, slow, or uneven all the time  · You have symptoms of worsening heart failure:      ? Shortness of breath at rest, at night, or that is getting worse in any way    ? Weight gain of 3 or more pounds (1 4 kg) in a day, or more than your healthcare provider says is okay    ? More swelling in your legs or ankles    ? Abdominal pain or swelling    ? More coughing    ? Loss of appetite    ? Feeling tired all the time    · You feel hopeless or depressed, or you have lost interest in things you used to enjoy  · You often feel worried or afraid  · You have questions or concerns about your condition or care  Medicines:   · Medicines  may be given to help regulate your heart rhythm and lower your blood pressure   You may also need medicines to help decrease extra fluids  Medicines, such as NSAIDs, may be stopped if they are causing your heart failure to become worse  Do not stop any of your medicines on your own  · Take your medicine as directed  Contact your healthcare provider if you think your medicine is not helping or if you have side effects  Tell him or her if you are allergic to any medicine  Keep a list of the medicines, vitamins, and herbs you take  Include the amounts, and when and why you take them  Bring the list or the pill bottles to follow-up visits  Carry your medicine list with you in case of an emergency  Go to cardiac rehab if directed:  Cardiac rehab is a program run by specialists who will help you safely strengthen your heart  In the program you will learn about exercise, relaxation, stress management, and heart-healthy nutrition  Manage swelling from extra fluid:   · Elevate (raise) your legs above the level of your heart  This will help with fluid that builds up in your legs or ankles  Elevate your legs as often as possible during the day  Prop your legs on pillows or blankets to keep them elevated comfortably  Try not to stand for long periods of time during the day  Move around to keep your blood circulating  · Limit sodium (salt)  Ask how much sodium you can have each day  Your healthcare provider may give you a limit, such as 2,300 milligrams (mg) a day  Your provider or a dietitian can teach you how to read food labels for the number of mg in a food  He or she can also help you find ways to have less salt  For example, if you add salt to food as you cook, do not add more at the table  · Drink liquids as directed  You may need to limit the amount of liquid you drink within 24 hours  Your healthcare provider will tell you how much liquid to have and which liquids are best for you  He or she may tell you to limit liquid to 1 5 to 2 liters in a day   He or she will also tell you how often to drink liquid throughout the day  · Weigh yourself every morning  Use the same scale, in the same spot  Do this after you use the bathroom, but before you eat or drink  Wear the same type of clothing each time  Write down your weight and call your healthcare provider if you have a sudden weight gain  Swelling and weight gain are signs of fluid buildup  Manage heart failure: Your quality of life may improve with treatment and the following:  · Do not smoke  Nicotine and other chemicals in cigarettes and cigars can cause lung and heart damage  Ask your healthcare provider for information if you currently smoke and need help to quit  E-cigarettes or smokeless tobacco still contain nicotine  Talk to your healthcare provider before you use these products  · Do not drink alcohol or use illegal drugs  Alcohol and drugs can increase your risk for high blood pressure, diabetes, and coronary artery disease  · Eat heart-healthy foods  Heart-healthy foods include fruits, vegetables, lean meat (such as beef, chicken, or pork), and low-fat dairy products  Fatty fish such as salmon and tuna are also heart healthy  Other heart-healthy foods include walnuts, whole-grain breads, beans, and cooked beans  Replace butter and margarine with heart-healthy oils such as olive oil or canola oil  Your provider or a dietitian can help you create heart-healthy meal plans  · Manage any chronic health conditions you have  These include high blood pressure, diabetes, obesity, high cholesterol, metabolic syndrome, and COPD  You will have fewer symptoms if you manage these health conditions  Follow your healthcare provider's recommendations and follow up with him or her regularly  · Maintain a healthy weight  Being overweight can increase your risk for high blood pressure, diabetes, and coronary artery disease  These conditions can make your symptoms worse  Ask your healthcare provider how much you should weigh  Ask him or her to help you create a weight loss plan if you are overweight  · Stay active  Activity can help keep your symptoms from getting worse  Walking is a type of physical activity that helps maintain your strength and improve your mood  Physical activity also helps you manage your weight  Work with your healthcare provider to create an exercise plan that is right for you  · Get vaccines as directed  The flu and pneumonia can be severe for a person who has heart failure  Vaccines protect you from these infections  Get a flu shot every year as soon as it is recommended, usually in September or October  You may also need the pneumonia vaccine  Your healthcare provider can tell you if you need other vaccines, and when to get them  Follow up with your doctor within 7 days and as directed: You may need to return for other tests  You may need home health care  A healthcare provider will monitor your vital signs, weight, and make sure your medicines are working  Write down your questions so you remember to ask them during your visits  Join a support group:  Heart failure can be difficult to manage  It may be helpful to talk with others who have heart failure  You may learn how to better manage your condition or get emotional support  For more information:  · [address]  [place] , [place]   Phone: 3- [phone]  Web Address: https://www strong [employer]/  [address] 2022 Information is for End User's use only and may not be sold, redistributed or otherwise used for commercial purposes  All illustrations and images included in CareNotes® are the copyrighted property of A D A M , Inc  or [facility] [employer]   The above information is an  only  It is not intended as medical advice for individual conditions or treatments   Talk to your doctor, nurse or pharmacist before following any medical regimen to see if it is safe and effective for you  A-fib (Atrial Fibrillation)   WHAT YOU NEED TO KNOW:   A-fib may come and go, or it may be a long-term condition  A-fib can cause blood clots, stroke, or heart failure  These conditions may become life-threatening  It is important to treat and manage A-fib to help prevent a blood clot, stroke, or heart failure  DISCHARGE INSTRUCTIONS:   Call your local emergency number (911 in the [address] [facility],[address]) or have someone call if:   · You have any of the following signs of a heart attack:      ? Squeezing, pressure, or pain in your chest    ? You may  also have any of the following:     § Discomfort or pain in your back, neck, jaw, stomach, or arm    § Shortness of breath    § Nausea or vomiting    § Lightheadedness or a sudden cold sweat    · You have any of the following signs of a stroke:      ? Numbness or drooping on one side of your face     ? Weakness in an arm or leg    ? Confusion or difficulty speaking    ? Dizziness, a severe headache, or vision loss    Call your doctor or cardiologist if:   · Your arm or leg feels warm, tender, and painful  It may look swollen and red  · Your heart rate is more than 110 beats per minute  · You have new or worsening swelling in your legs, feet, ankles, or abdomen  · You are short of breath, even at rest     · You have questions or concerns about your condition or care  Medicines: You may need any of the following:  · Heart medicines  help control your heart rate or rhythm  You may need more than one medicine to treat your symptoms  · Blood thinners  help prevent blood clots  Clots can cause strokes, heart attacks, and death  The following are general safety guidelines to follow while you are taking a blood thinner:    ? Watch for bleeding and bruising while you take blood thinners  Watch for bleeding from your gums or nose  Watch for blood in your urine and bowel movements  Use a soft washcloth on your skin, and a soft toothbrush to brush your teeth   This can keep your skin and gums from bleeding  If you shave, use an electric shaver  Do not play contact sports  ? Tell your dentist and other healthcare providers that you take a blood thinner  Wear a bracelet or necklace that says you take this medicine  ? Do not start or stop any other medicines unless your healthcare provider tells you to  Many medicines cannot be used with blood thinners  ? Take your blood thinner exactly as prescribed by your healthcare provider  Do not skip does or take less than prescribed  Tell your provider right away if you forget to take your blood thinner, or if you take too much  ? Warfarin  is a blood thinner that you may need to take  The following are things you should be aware of if you take warfarin:     § Foods and medicines can affect the amount of warfarin in your blood  Do not make major changes to your diet while you take warfarin  Warfarin works best when you eat about the same amount of vitamin K every day  Vitamin K is found in green leafy vegetables and certain other foods  Ask for more information about what to eat when you are taking warfarin  § You will need to see your healthcare provider for follow-up visits when you are on warfarin  You will need regular blood tests  These tests are used to decide how much medicine you need  · Antiplatelets , such as aspirin, help prevent blood clots  Take your antiplatelet medicine exactly as directed  These medicines make it more likely for you to bleed or bruise  If you are told to take aspirin, do not take acetaminophen or ibuprofen instead  · Take your medicine as directed  Contact your healthcare provider if you think your medicine is not helping or if you have side effects  Tell him or her if you are allergic to any medicine  Keep a list of the medicines, vitamins, and herbs you take  Include the amounts, and when and why you take them  Bring the list or the pill bottles to follow-up visits   Carry your medicine list with you in case of an emergency  Manage A-fib:   · Know your target heart rate  Learn how to check your pulse and monitor your heart rate  · Know the risks if you choose to drink alcohol  Alcohol can increase your risk for A-fib or make A-fib harder to manage  Ask your healthcare provider if it is okay for you to drink any alcohol  He or she can help you set limits for the number of drinks you have in 24 hours and in a week  A drink of alcohol is 12 ounces of beer, 5 ounces of wine, or 1½ ounces of liquor  · Do not smoke  Nicotine can cause heart damage and make it more difficult to manage your A-fib  Do not use e-cigarettes or smokeless tobacco in place of cigarettes or to help you quit  They still contain nicotine  Ask your healthcare provider for information if you currently smoke and need help quitting  · Eat heart-healthy foods  Heart healthy foods will help keep your cholesterol low  These include fruits, vegetables, whole-grain breads, low-fat dairy products, beans, lean meats, and fish  Replace butter and margarine with heart-healthy oils such as olive oil and canola oil  · Maintain a healthy weight  Ask your healthcare provider what a healthy weight is for you  Ask him or her to help you create a safe weight loss plan if you are overweight  Even a small goal of a 10% weight loss can improve your heart health  · Get regular physical activity  Physical activity helps improve your heart health  Get at least 150 minutes of moderate aerobic physical activity each week  Your healthcare provider can help you create an activity plan  · Manage other health conditions  This includes high blood pressure or cholesterol, sleep apnea, diabetes, and other heart conditions  Take medicine as directed and follow your treatment plan  Your healthcare provider may need to change a medicine you are taking if it is causing your A-fib   Do not  stop taking any medicine unless directed by your provider  Follow up with your doctor or cardiologist as directed: You will need regular blood tests and monitoring  Write down your questions so you remember to ask them during your visits  © Copyright [employer] 2022 Information is for End User's use only and may not be sold, redistributed or otherwise used for commercial purposes  All illustrations and images included in CareNotes® are the copyrighted property of A D A M , Inc  or Paolo Green   The above information is an  only  It is not intended as medical advice for individual conditions or treatments  Talk to your doctor, nurse or pharmacist before following any medical regimen to see if it is safe and effective for you  Coronary Artery Disease   WHAT YOU NEED TO KNOW:   Coronary artery disease (CAD) is narrowing of the arteries to your heart caused by a buildup of plaque  Plaque is made up of cholesterol and other substances  The narrowing in your arteries decreases the amount of blood that can flow to your heart  This causes your heart to get less oxygen, which may be life-threatening  DISCHARGE INSTRUCTIONS:   Call your local emergency number (911 in the [address] [place] [address],[address]), or have someone call if:   · You have any of the following signs of a heart attack:      ? Squeezing, pressure, or pain in your chest    ? You may  also have any of the following:     § Discomfort or pain in your back, neck, jaw, stomach, or arm    § Shortness of breath    § Nausea or vomiting    § Lightheadedness or a sudden cold sweat      Seek care immediately if:   · You have chest pain that happens often  · You have chest pain at rest     Call your doctor if:   · You have questions or concerns about your condition or care  Cardiac rehabilitation(rehab)  is a program run by specialists who will help you safely strengthen your heart and reduce the risk for more heart disease   The plan includes exercise, relaxation, stress management, and heart-healthy nutrition  Healthcare providers will also check to make sure any medicines you are taking are working  Medicines: You may  need any of the following:  · Blood pressure medicines  are given to lower your blood pressure  These medicines may include ACE inhibitors and beta-blockers  ACE inhibitors help keep your blood vessels relaxed and open  This helps keep blood flowing into your heart  Beta-blockers keep your heart pumping strongly and regularly  This helps keep your heart from working too hard to get oxygen  · Cholesterol medicines  help lower blood cholesterol levels  · Nitrates , such as nitroglycerin, relax the arteries of your heart so it gets more oxygen  Nitrates may also help relieve your chest pain  · Diuretics  help your body get rid of extra fluid and protect your heart from more damage  You may urinate more often while you are taking diuretics  · Antiplatelets , such as aspirin, help prevent blood clots  Take your antiplatelet medicine exactly as directed  These medicines make it more likely for you to bleed or bruise  If you are told to take aspirin, do not take acetaminophen or ibuprofen instead  · Blood thinners  help prevent blood clots  Clots can cause strokes, heart attacks, and death  The following are general safety guidelines to follow while you are taking a blood thinner:    ? Watch for bleeding and bruising while you take blood thinners  Watch for bleeding from your gums or nose  Watch for blood in your urine and bowel movements  Use a soft washcloth on your skin, and a soft toothbrush to brush your teeth  This can keep your skin and gums from bleeding  If you shave, use an electric shaver  Do not play contact sports  ? Tell your dentist and other healthcare providers that you take a blood thinner  Wear a bracelet or necklace that says you take this medicine       ? Do not start or stop any other medicines unless your healthcare provider tells you to  Many medicines cannot be used with blood thinners  ? Take your blood thinner exactly as prescribed by your healthcare provider  Do not skip does or take less than prescribed  Tell your provider right away if you forget to take your blood thinner, or if you take too much  ? Warfarin  is a blood thinner that you may need to take  The following are things you should be aware of if you take warfarin:     § Foods and medicines can affect the amount of warfarin in your blood  Do not make major changes to your diet while you take warfarin  Warfarin works best when you eat about the same amount of vitamin K every day  Vitamin K is found in green leafy vegetables and certain other foods  Ask for more information about what to eat when you are taking warfarin  § You will need to see your healthcare provider for follow-up visits when you are on warfarin  You will need regular blood tests  These tests are used to decide how much medicine you need  · Do not take certain medicines without asking your healthcare provider first   These include NSAIDs, herbal or vitamin supplements, or hormones (estrogen or progestin)  Follow up with your doctor as directed: You may need to return for other tests  You may also be referred to a cardiac surgeon  Write down your questions so you remember to ask them during your visits  Manage CAD:   · Do not smoke  Nicotine and other chemicals in cigarettes and cigars can cause heart and lung damage  Ask your healthcare provider for information if you currently smoke and need help to quit  E-cigarettes or smokeless tobacco still contain nicotine  Talk to your healthcare provider before you use these products  · Be physically active  Physical activity, such as exercise, can lower your blood pressure, cholesterol, weight, and blood sugar levels  Healthcare providers will help you create physical activity goals  They can also help you make a plan to reach your goals  For example, you can break activity into 10-minute periods, 3 times in the day  Find activities you enjoy  This will make it easier for you to reach your goals  · Maintain a healthy weight  If you are overweight, talk to your healthcare provider about how to lose weight  A weight loss of 10% can improve your heart health  · Eat heart healthy foods  Include fresh fruits and vegetables in your meal plan  Choose low-fat foods, such as skim or 1% fat milk, low-fat cheese and yogurt, fish, chicken (without skin), and lean meats  Eat two 4-ounce servings of fish high in omega-3 fats each week, such as salmon, fresh tuna, and herring  Avoid foods that are high in sodium, such as canned foods, potato chips, salty snacks, and cold cuts  Put less table salt on your food  · Limit or do not drink alcohol  A drink of alcohol is 12 ounces of beer, 5 ounces of wine, or 1½ ounces of liquor  Your healthcare provider can tell you how many drinks are okay within 24 hours and within 1 week  · Manage other health conditions  Follow your healthcare provider's advice on how to manage other conditions that can affect your heart health  These include diabetes, high blood pressure, and high cholesterol  You may need to take medicines for these conditions and make other lifestyle changes  · Manage stress  Stress can raise your blood pressure  Find new ways to relax, such as deep breathing or listening to music  · Ask about vaccines you may need  Vaccines help prevent certain diseases that can be dangerous for a person with CAD  Get a flu vaccine as soon as recommended each year, usually in September or October  You may also need vaccines to prevent pneumonia or COVID-19  Your healthcare provider can tell you if you should get other vaccines, and when to get them      © Copyright [employer] 2022 Information is for End User's use only and may not be sold, redistributed or otherwise used for commercial purposes  All illustrations and images included in CareNotes® are the copyrighted property of A D A M , Inc  or Paolo Caceres  The above information is an  only  It is not intended as medical advice for individual conditions or treatments  Talk to your doctor, nurse or pharmacist before following any medical regimen to see if it is safe and effective for you

## 2022-03-28 NOTE — PROGRESS NOTES
[facility]    Cardiology Progress Note  Joaquim Newman 55 y o  female   YOB: 1965 MRN: [id number]  Unit/Bed#: -01 Encounter: [id number]      Subjective:   No significant events overnight  Shortness of breath is improved  Tracheostomy tube was repositioned by surgery  No current complains pedal edema, orthopnea or shortness of    Assessments  Principal Problem:    Acute on chronic combined systolic and diastolic heart failure (Former[facility])  Active Problems:    Hypertension    Type 2 diabetes mellitus treated with insulin (HCC)    Hypokalemia    A-fib (HCC)    Acute on chronic respiratory failure with hypoxia (HCC)    CAD (coronary artery disease)    Tracheostomy in place [facility])    Elevated troponin    Microcytic anemia        Plan  Acute on chronic diastolic heart failure  · Most recent echo done at [address] [place] [address] in February 2022 showed LVEF of 50% with wall motion noted related to prior lateral MI  Moderate LVH  · Weight was up from discharge weight of 187 lb to 194 lb  · Diuresed well and now weight down to 189 lb  · Recent discharge diuretic: Lasix 20 mg daily  · Okay for discharge on increased dose of Lasix 40 mg daily with spironolactone 100 mg daily    CAD s/p PCI  · No chest pain or anginal symptoms  · Has been on ticagrelor plus Eliquis for the last few months without issue so far  · She had mild tracheal bleeding related to stenosis/dislodgement, which is now resolved  · No current chest pain or ongoing anginal issues     H/o multiple cardiac arrests, needing shocks, VT  · Likely related to lateral scar  · Currently has LifeVest in place and is awaiting ICD placement - has follow up with Dr Murrieta on 4/20/22  · Continue metoprolol 25 mg bid, amiodarone    Stable for discharge from cardiac standpoint with outpatient follow-up  She had an appointment with Guerline Mcallister today, which will be rescheduled to sometime next week    Office will call with the appointment  Discussed with primary medical service Dr Rodolfo Ralph    Review of Systems   All other systems reviewed and are negative  Telemetry Review: not on telemetry    Objective:   Vitals: Blood pressure 135/69, pulse 64, temperature 98 1 °F (36 7 °C), temperature source Oral, resp  rate 21, height 5' 9" (1 753 m), weight 86 kg (189 lb 9 5 oz), SpO2 96 %  , Body mass index is 28 kg/m² ,   Orthostatic Blood Pressures      Most Recent Value   Blood Pressure 135/69 filed at 03/28/2022 0748   Patient Position - Orthostatic VS Lying filed at 03/28/2022 7392         Systolic (36TMF), GFA:219 , Min:135 , MDV:318     Diastolic (98RBW), WAZ:76, Min:69, Max:92    Wt Readings from Last 5 Encounters:   03/28/22 86 kg (189 lb 9 5 oz)   03/03/22 85 2 kg (187 lb 13 3 oz)   11/23/21 87 5 kg (192 lb 14 4 oz)   11/04/21 91 3 kg (201 lb 4 5 oz)   09/28/21 92 5 kg (204 lb)     I/O       03/26 0701  03/27 0700 03/27 0701  03/28 0700 03/28 0701  03/29 0700    P  O   480 450    Total Intake(mL/kg)  480 (5 6) 450 (5 2)    Urine (mL/kg/hr) 900 (0 4) 1200 (0 6) 800 (2)    Total Output 900 1200 800    Net -900 720 -350                     Physical Exam  Vitals and nursing note reviewed  Constitutional:       General: She is not in acute distress  Appearance: Normal appearance  She is well-developed  She is not ill-appearing or diaphoretic  HENT:      Head: Normocephalic and atraumatic  Nose: No congestion  Eyes:      General: No scleral icterus  Conjunctiva/sclera: Conjunctivae normal    Neck:      Vascular: No JVD  Comments: Tracheostomy tube noted in place  Cardiovascular:      Rate and Rhythm: Normal rate and regular rhythm  Pulses: Normal pulses  Heart sounds: Normal heart sounds  No murmur heard  No friction rub  No gallop  Pulmonary:      Effort: Pulmonary effort is normal  No respiratory distress  Breath sounds: Normal breath sounds  No rales     Abdominal:      General: There is no distension  Palpations: Abdomen is soft  Tenderness: There is no abdominal tenderness  Musculoskeletal:         General: No swelling or tenderness  Right lower leg: No edema  Left lower leg: No edema  Skin:     General: Skin is warm  Neurological:      General: No focal deficit present  Mental Status: She is alert and oriented to person, place, and time  Mental status is at baseline  Psychiatric:         Mood and Affect: Mood normal          Behavior: Behavior normal          Thought Content:  Thought content normal        Laboratory Results: personally reviewed        CBC with diff:   Results from last 7 days   Lab Units 03/28/22 0618 03/27/22  0518 03/26/22  0517 03/25/22  1841   WBC Thousand/uL 9 60 8 78 8 44 9 25   HEMOGLOBIN g/dL 8 8* 7 9* 8 4* 9 4*   HEMATOCRIT % 28 7* 26 8* 27 6* 30 3*   MCV fL 82 85 83 81*   PLATELETS Thousands/uL 326 316 322 359   MCH pg 25 0* 24 9* 25 3* 25 1*   MCHC g/dL 30 7* 29 5* 30 4* 31 0*   RDW % 18 5* 18 2* 18 1* 17 9*   MPV fL 11 6 11 7 12 0 11 5   NRBC AUTO /100 WBCs 0 0 0 0         CMP:  Results from last 7 days   Lab Units 03/28/22 0618 03/27/22  0518 03/26/22  0517 03/25/22  1841   POTASSIUM mmol/L 3 7 2 9* 3 0* 3 6   CHLORIDE mmol/L 105 104 106 105   CO2 mmol/L 26 27 26 25   BUN mg/dL 12 12 13 14   CREATININE mg/dL 0 85 0 86 0 82 0 83   CALCIUM mg/dL 8 3* 8 1* 8 5 9 0   AST U/L  --   --   --  15   ALT U/L  --   --   --  10   ALK PHOS U/L  --   --   --  82   EGFR ml/min/1 73sq m 77 76 80 79         BMP:  Results from last 7 days   Lab Units 03/28/22 0618 03/27/22  0518 03/26/22  0517 03/25/22  1841   POTASSIUM mmol/L 3 7 2 9* 3 0* 3 6   CHLORIDE mmol/L 105 104 106 105   CO2 mmol/L 26 27 26 25   BUN mg/dL 12 12 13 14   CREATININE mg/dL 0 85 0 86 0 82 0 83   CALCIUM mg/dL 8 3* 8 1* 8 5 9 0       BNP:   Recent Labs     03/25/22  1841 03/27/22  0518   * 280*       Magnesium:   Results from last 7 days   Lab Units 03/28/22  0618 03/26/22  0517   MAGNESIUM mg/dL 1 5* 1 6*       Coags:       TSH:        Hemoglobin A1C       Lipid Profile:       Meds/Allergies   all current active meds have been reviewed and current meds:   Current Facility-Administered Medications   Medication Dose Route Frequency    acetaminophen (TYLENOL) tablet 650 mg  650 mg Oral Q4H PRN    albuterol inhalation solution 2 5 mg  2 5 mg Nebulization Q4H PRN    amiodarone tablet 100 mg  100 mg Oral Daily With Breakfast    apixaban (ELIQUIS) tablet 5 mg  5 mg Oral BID    atorvastatin (LIPITOR) tablet 40 mg  40 mg Oral Daily    escitalopram (LEXAPRO) tablet 10 mg  10 mg Oral Daily    famotidine (PEPCID) tablet 20 mg  20 mg Oral BID    [START ON 3/29/2022] furosemide (LASIX) tablet 40 mg  40 mg Oral Daily    insulin glargine (LANTUS) subcutaneous injection 8 Units 0 08 mL  8 Units Subcutaneous HS    insulin lispro (HumaLOG) 100 units/mL subcutaneous injection 1-6 Units  1-6 Units Subcutaneous TID AC    insulin lispro (HumaLOG) 100 units/mL subcutaneous injection 1-6 Units  1-6 Units Subcutaneous HS    insulin lispro (HumaLOG) 100 units/mL subcutaneous injection 2 Units  2 Units Subcutaneous Daily With Breakfast    insulin lispro (HumaLOG) 100 units/mL subcutaneous injection 2 Units  2 Units Subcutaneous Daily With Lunch    insulin lispro (HumaLOG) 100 units/mL subcutaneous injection 2 Units  2 Units Subcutaneous Daily With Dinner    ipratropium (ATROVENT) 0 02 % inhalation solution 0 5 mg  0 5 mg Nebulization TID    levalbuterol (XOPENEX) inhalation solution 1 25 mg  1 25 mg Nebulization TID    LORazepam (ATIVAN) tablet 0 5 mg  0 5 mg Oral BID PRN    losartan (COZAAR) tablet 50 mg  50 mg Oral Daily    melatonin tablet 6 mg  6 mg Oral HS    metoprolol tartrate (LOPRESSOR) tablet 25 mg  25 mg Oral Q12H CEFERINO    polyethylene glycol (MIRALAX) packet 17 g  17 g Oral Daily    potassium chloride (K-DUR,KLOR-CON) CR tablet 40 mEq  40 mEq Oral Once    potassium chloride oral solution 20 mEq  20 mEq Oral BID    pregabalin (LYRICA) capsule 75 mg  75 mg Oral Daily    senna-docusate sodium (SENOKOT S) 8 6-50 mg per tablet 1 tablet  1 tablet Oral HS    spironolactone (ALDACTONE) tablet 100 mg  100 mg Oral Daily    ticagrelor (BRILINTA) tablet 90 mg  90 mg Oral Q12H CEFERINO    traZODone (DESYREL) tablet 50 mg  50 mg Oral HS PRN     Medications Prior to Admission   Medication    acetaminophen (TYLENOL) 160 mg/5 mL suspension    albuterol (2 5 mg/3 mL) 0 083 % nebulizer solution    amiodarone 100 mg tablet    Apidra SoloStar 100 units/mL injection pen    apixaban (ELIQUIS) 5 mg    atorvastatin (LIPITOR) 40 mg tablet    Basaglar KwikPen 100 units/mL injection pen    Blood Pressure Monitoring (Sphygmomanometer) MISC    chlorhexidine (PERIDEX) 0 12 % solution    escitalopram (Lexapro) 10 mg tablet    famotidine (PEPCID) 20 mg/2 5 mL oral suspension    furosemide (LASIX) 20 mg tablet    insulin aspart (NovoLOG FlexPen) 100 UNIT/ML injection pen    Insulin Pen Needle (Novofine Pen Needle) 32G X 6 MM MISC    Insulin Pen Needle (UNIFINE PENTIPS PLUS) 31G X 6 MM MISC    ipratropium (ATROVENT) 0 02 % nebulizer solution    Lancets MISC    levalbuterol (XOPENEX) 1 25 mg/0 5 mL nebulizer solution    LORazepam (Ativan) 1 mg tablet    losartan (COZAAR) 50 mg tablet    melatonin 3 mg    metoprolol tartrate (LOPRESSOR) 25 mg tablet    ondansetron (ZOFRAN) 4 mg/2 mL injection    polyethylene glycol (MIRALAX) 17 g packet    potassium chloride 10% oral solution    pregabalin (LYRICA) 75 mg capsule    senna-docusate sodium (SENOKOT S) 8 6-50 mg per tablet    spironolactone (ALDACTONE) 100 mg tablet    ticagrelor (BRILINTA) 90 MG    traZODone (DESYREL) 50 mg tablet            Cardiac testing: reviewed  No results found for this or any previous visit  No results found for this or any previous visit  No results found for this or any previous visit      No results found for this or any previous visit

## 2022-03-28 NOTE — ASSESSMENT & PLAN NOTE
· S/p STEMI and PATRICA 13/4872 with complicated hospitalization involving cardiac arrest, ventilator-dependent respiratory failure and trach/PEG placement    · No acute complaints  · Continue home medications

## 2022-03-28 NOTE — DISCHARGE SUMMARY
Y[facility]  67   Discharge- Perla Sickle 1965, 55 y o  female MRN: [id number]  Unit/Bed#: -01 Encounter: [id number]  Primary Care Provider: Ruth Verdugo MD   Date and time admitted to hospital: 3/25/2022  6:24 PM    Microcytic anemia  Assessment & Plan  · Hgb 9 4  · Baseline appears in 8 0s  · Last iron panel 10/21 reveals iron deficiency anemia, continue supplementation    Elevated troponin  Assessment & Plan  · Non MI troponin elevation  · Denies chest pain  Only endorses dyspnea  · Troponin appears at baseline level  Elevation likely due to chronic respiratory failure/volume overload   · ECG: NSR, 60 with T wave inversion in V2     Tracheostomy in place [facility])  Assessment & Plan  Replaced back in position by surgery DUANE Pierson  Currently patient feels much better  No hemoptysis  Recommend outpatient follow-up with ENT/pulmonology regarding further management upon discharge  CAD (coronary artery disease)  Assessment & Plan  · S/p STEMI and PATRICA 39/4789 with complicated hospitalization involving cardiac arrest, ventilator-dependent respiratory failure and trach/PEG placement  · No acute complaints  · Continue home medications    Acute on chronic respiratory failure with hypoxia (HCC)  Assessment & Plan  Currently at baseline oxygen requirements,  Chest x-ray reported as bilateral ground-glass opacities, COVID-19 negative  Improved now, patient back to baseline  A-fib [facility])  Assessment & Plan  · NSR on admission  · Continue Amiodarone, Eliquis, metoprolol    Hypokalemia  Assessment & Plan  Likely secondary to diuresis, supplement potassium, to keep potassium more than 4      Type 2 diabetes mellitus treated with insulin [facility])  Assessment & Plan  Lab Results   Component Value Date    HGBA1C 6 6 (H) 01/01/2022       Recent Labs     03/27/22  1617 03/27/22  2159 03/28/22  0657 03/28/22  1110   POCGLU 189* 227* 226* 220*       Blood Sugar Average: Last 72 hrs:  (P) 217 Lab Results   Component Value Date    HGBA1C 6 6 (H) 01/01/2022       Recent Labs     03/27/22  1617 03/27/22  2159 03/28/22  0657 03/28/22  1110   POCGLU 189* 227* 226* 220*       Blood Sugar Average: Last 72 hrs:  · (P) 217Controlled  · Continue home regimen: Lantus 8 units hs, Novolog 2 units TID with meals  · SSI with Acu-Checks ac/hs    Hypertension  Assessment & Plan    · Continue home medications     * Acute on chronic combined systolic and diastolic heart failure (HCC)  Assessment & Plan  Improved now, patient appears euvolemic, will transition to oral Lasix  Discussed with cardiologist, recommended 40 mg of Lasix orally  Discharging Physician / Practitioner: Kaya Phillip MD  PCP: Flash Felipe MD  Admission Date:   Admission Orders (From admission, onward)     Ordered        03/26/22 1607  Inpatient Admission  Once            03/25/22 2019  Place in Observation  Once                      Discharge Date: 03/28/22    Medical Problems             Resolved Problems  Date Reviewed: 3/28/2022    None                Consultations During Hospital Stay:  · Pulmonology  · Cardiology    Procedures Performed:   · none    Significant Findings / Test Results:   Interval development of diffuse bilateral opacities suspicious for multifocal pneumonia vs  CHF        Outpatient Tests Requested:  · CBC and CMP    Complications:  None    Reason for Admission:     Hospital Course:  Dyspnea  H&P on 3/25/2022 by Markell Fofana : " Kiran Olivarez is a 55 y o  female with a PMH of HTN, paroxysmal AFib on Eliquis, STEMI , chronic respiratory failure with tracheostomy who presents with complaints of shortness of breath x 3-4 days  Denies fever, chills, cough, chest pain, palpitations, N/V/D, abdominal pain  Does report orthopnea and nighttime awakenings feeling short of breath  Per EMS son suctioned her trach today and pulled out a blood clot, no further bleeding noted    Initially requiring an increase in oxygen from her baseline up to 15 L from 10 L  Now back down to 10 L with SpO2 98%  Reports compliance with home medications and a cardiac/low sodium diet      Patient had a recent hospitalization d/c 3/3/22 requiring ICU stay for ventilatory support due to acute hypoxic respiratory failure secondary to CHF exacerbation  Patient weight tonight noted to be positive 13 lb since discharge on March 3rd  Obtain standing scale weight  BNP elevated, noted be higher than on previous values  Appears volume overloaded on exam   Plan for admission for IV diuresis and Cardiology consult "      Patient was hospitalized, pulmonology Service and Cardiology Service were consulted  She was started on IV diuresis and transition today to p o  Lasix  Kidney function and electrolytes closely monitor had been stable  Patient is currently on tracheostomy collar saturating well  She has a history of subglottic stenosis, she is not a candidate for decannulation at this time  Her tracheostomy tube was replaced after accidentally got dislodge, no further episodes of bleeding during this admission  Patient has a life vest on, she is awaiting for the fibrillator placement, she has appointment with Dr Fátima Resendiz on April, for now will continue metoprolol and amiodarone  Patient did not have any chest pain or anginal symptoms during the admission, take Adderall and Eliquis were continued  As per cardio recommendations she is stable for discharge with an increased dose of Lasix to 40 mg daily and spironolactone 100 mg daily  Patient will need to follow-up closely with Cardiology, pulmonology  Referral given for ENT for evaluation of subglottic stenosis  Follow-up with primary care provider to discuss results of blood work    Please see above list of diagnoses and related plan for additional information       Condition at Discharge: stable     Discharge Day Visit / Exam:     Subjective:    Patient was seen this morning at bedside, shortness of breath has improved  No overnight events  Vitals: Blood Pressure: 135/69 (03/28/22 0748)  Pulse: 64 (03/27/22 2300)  Temperature: 98 1 °F (36 7 °C) (03/28/22 0748)  Temp Source: Oral (03/28/22 0748)  Respirations: 21 (03/27/22 2300)  Height: 5' 9" (175 3 cm) (03/25/22 2056)  Weight - Scale: 86 kg (189 lb 9 5 oz) (03/28/22 0600)  SpO2: 96 % (03/28/22 0748)  Exam:   Physical Exam  Vitals reviewed  Constitutional:       General: She is not in acute distress  Appearance: She is not toxic-appearing or diaphoretic  Comments: Tracheostomy collar in place   HENT:      Head: Normocephalic  Eyes:      General: No scleral icterus  Right eye: No discharge  Left eye: No discharge  Conjunctiva/sclera: Conjunctivae normal    Cardiovascular:      Rate and Rhythm: Normal rate  Heart sounds: Normal heart sounds  Pulmonary:      Effort: No tachypnea, accessory muscle usage, respiratory distress or retractions  Breath sounds: Transmitted upper airway sounds present  Abdominal:      General: There is no distension  Tenderness: There is no abdominal tenderness  There is no guarding  Musculoskeletal:      Right lower leg: No edema  Left lower leg: No edema  Skin:     General: Skin is warm  Neurological:      Mental Status: She is alert and oriented to person, place, and time  Mental status is at baseline  Psychiatric:         Attention and Perception: Attention and perception normal          Mood and Affect: Mood is anxious  Behavior: Behavior normal          Discussion with Family: yes  Discharge instructions/Information to patient and family:   See after visit summary for information provided to patient and family  Provisions for Follow-Up Care:  See after visit summary for information related to follow-up care and any pertinent home health orders        Disposition:     Home with VNA Services (Reminder: Complete face to face encounter)    For Discharges to [facility]F:   · Not Applicable to this Patient - Not Applicable to this Patient    Planned Readmission: n/a         Discharge Medications:  See after visit summary for reconciled discharge medications provided to patient and family        ** Please Note: This note has been constructed using a voice recognition system **

## 2022-03-29 ENCOUNTER — TRANSITIONAL CARE MANAGEMENT (OUTPATIENT)
Dept: FAMILY MEDICINE CLINIC | Facility: CLINIC | Age: 57
End: 2022-03-29

## 2022-03-31 ENCOUNTER — RA CDI HCC (OUTPATIENT)
Dept: OTHER | Facility: HOSPITAL | Age: 57
End: 2022-03-31

## 2022-03-31 NOTE — PROGRESS NOTES
[id number]  [id number]  [facility] 75  coding opportunities          Chart Reviewed number of suggestions sent to Provider: 2     Patients Insurance     Medicare Insurance: Medicare

## 2022-04-07 ENCOUNTER — TELEMEDICINE (OUTPATIENT)
Dept: FAMILY MEDICINE CLINIC | Facility: CLINIC | Age: 57
End: 2022-04-07
Payer: COMMERCIAL

## 2022-04-07 DIAGNOSIS — J96.11 CHRONIC HYPOXEMIC RESPIRATORY FAILURE (HCC): ICD-10-CM

## 2022-04-07 DIAGNOSIS — F41.9 ANXIETY: ICD-10-CM

## 2022-04-07 PROCEDURE — 99213 OFFICE O/P EST LOW 20 MIN: CPT | Performed by: FAMILY MEDICINE

## 2022-04-07 RX ORDER — LORAZEPAM 1 MG/1
0.5 TABLET ORAL 2 TIMES DAILY PRN
Qty: 14 TABLET | Refills: 0 | Status: SHIPPED | OUTPATIENT
Start: 2022-04-07 | End: 2022-06-20 | Stop reason: SDUPTHER

## 2022-04-07 RX ORDER — ALBUTEROL SULFATE 2.5 MG/3ML
2.5 SOLUTION RESPIRATORY (INHALATION) EVERY 4 HOURS PRN
Qty: 180 ML | Refills: 5 | Status: ON HOLD | OUTPATIENT
Start: 2022-04-07

## 2022-04-07 NOTE — PROGRESS NOTES
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%) and patient previously deemed not a candidate for decannulation at this time; follow up with Pulmonology & ENT for further management  -Refilled albuterol nebulizer solution today, continue Q4H PRN at home  -[facility] nursing home visit scheduled tomorrow to obtain VS, including SpO2         Relevant Medications    albuterol (2 5 mg/3 mL) 0 083 % nebulizer solution       Other    Anxiety     -Patient on lexapro for about 3 weeks now, still having a lot of anxiety, mostly related to her chronic health issues and breathing anxiety in the setting of tracheostomy  -Breathing has been back to baseline O2 needs since D/C from hospital per patient  -While awaiting lexapro to reach therapeutic efficacy, will continue ativan for now  -Reviewed PDMP, no red flags; refill sent to pharmacy  -Plan for patient to complete controlled substance agreement at next home visit  -Encourage patient to establish care with therapist to help her cope with stress/anxiety related to her health issues and her fear over uncertainty regarding duration of trach         Relevant Medications    LORazepam (Ativan) 1 mg tablet        Advised patient I would like to schedule another home visit with her in 2-3 weeks to assess mood and respiratory status, as well as to ensure patient has scheduled recommended outpatient evaluations with Pulmonology & ENT         Reason for visit is   Chief Complaint   Patient presents with    Virtual Regular Visit        Encounter provider Verito Velarde MD    Provider located at [address] [place] [address] [facility]  [phone]      Recent Visits  No visits were found meeting these conditions  Showing recent visits within past 7 days and meeting all other requirements  Future Appointments  No visits were found meeting these conditions  Showing future appointments within next 150 days and meeting all other requirements       The patient was identified by name and date of birth  Kelli Red was informed that this is a telemedicine visit and that the visit is being conducted through Telephone  My office door was closed  No one else was in the room  She acknowledged consent and understanding of privacy and security of the video platform  The patient has agreed to participate and understands they can discontinue the visit at any time  Patient is aware this is a billable service  It was my intent to perform this visit via video technology but the patient was not able to do a video connection so the visit was completed via audio telephone only  Subjective  Kelli Handley is a 55 y o  female  HPI     Patient last seen at home visit 3/21/22 but went to ED 3/25/22 for SOB and bleeding from trach site, and was admitted with tracheostomy placed back in position by surgical PA during admission with O2 needs back to 10L (baseline) and patient D/C'ed on home meds with change to diuretic regimen, only PO lasix 40 mg QD now per Cardiology  ENT referral given for subglottic stenosis and Pulmonology  Has Cardiology appt with Dr Rose Herrera 4/20/22 but has not yet scheduled with Pulmonology or ENT per chart review      Patient is unable to speak very clearly via phone due to tracheostomy in place and caregiver unable to speak for patient due to taking care of the kids at the time of call, but patient denies any acute needs following D/C from hospital, no acute complaints today but does mention she needs some refills on her medications today  Most of her medications were refilled with 90 day prescriptions at time of home visit, but she was only given 10 tablets of Ativan and could use refill now as anxiety continues to be an issue for her and lexapro has only been on for a few weeks  Also needs albuterol refill for nebulizer  Past Medical History:   Diagnosis Date    Anxiety     Aortic aneurysm ([address] )     Arthritis     Depression     Diabetes mellitus (HCC)     Fibromyalgia     GERD (gastroesophageal reflux disease)     GERD (gastroesophageal reflux disease)     H/O cardiovascular stress test 09/2018    no ischemia  EF 70%   H/O echocardiogram 01/2019    EF 60%  Mild LVH  trivial effusion   Hyperlipidemia     Hypertension     Migraines     Psychiatric disorder     anxiety    Uncontrolled hypertension 2/25/2015    Last Assessment & Plan:  BP today above goal <140/90, apparently asymptomatic  Prior BP elevations were attributed to not taking medication  Consider increased medication if persistent on f/u  Past Surgical History:   Procedure Laterality Date    BACK SURGERY      Lumbar epidural steroid injection    CARDIAC CATHETERIZATION N/A 10/22/2021    Procedure: Cardiac pci;  Surgeon: Gale Ruiz MD;  [facility]; Service: Cardiology    CARDIAC CATHETERIZATION  10/22/2021    Procedure: Cardiac catheterization;  Surgeon: Gale Ruiz MD;  [facility]; Service: Cardiology    CARDIAC CATHETERIZATION Left 10/27/2021    Procedure: Cardiac Left Heart Cath;  Surgeon: Bishnu Vaughan MD;  [facility]; Service: Cardiology    CARDIAC CATHETERIZATION N/A 10/27/2021    Procedure: Cardiac pci;  Surgeon: Bishnu Vaughan MD;  [facility];   Service: Cardiology    CARDIAC CATHETERIZATION N/A 10/28/2021    Procedure: Cardiac pci;  Surgeon: Apple Pascual DO Jonel;  [facility];   Service: Cardiology    CARPAL TUNNEL RELEASE Left      SECTION      CHOLECYSTECTOMY      COLONOSCOPY      incomplete    COLONOSCOPY      EYE SURGERY      HYSTERECTOMY      Total    OVARIAN CYST REMOVAL      PEG W/TRACHEOSTOMY PLACEMENT N/A 2021    Procedure: TRACHEOSTOMY WITH INSERTION PEG TUBE;  Surgeon: Camilo Fung DO;  Location: [facility];  Service: General    TUBAL LIGATION      UPPER GASTROINTESTINAL ENDOSCOPY         Current Outpatient Medications   Medication Sig Dispense Refill    acetaminophen (TYLENOL) 160 mg/5 mL suspension 30 4 mL (975 mg total) by Per G Tube route every 6 (six) hours as needed for mild pain or fever  0    albuterol (2 5 mg/3 mL) 0 083 % nebulizer solution Take 3 mL (2 5 mg total) by nebulization every 4 (four) hours as needed for wheezing  0    amiodarone 100 mg tablet Take 1 tablet (100 mg total) by mouth daily with breakfast 90 tablet 0    apixaban (ELIQUIS) 5 mg Take 1 tablet (5 mg total) by mouth 2 (two) times a day 180 tablet 0    atorvastatin (LIPITOR) 40 mg tablet Take 1 tablet (40 mg total) by mouth daily 90 tablet 0    Basaglar KwikPen 100 units/mL injection pen INJECT 8 UNITS UNDER THE SKIN DAILY 15 mL 2    Blood Pressure Monitoring (Sphygmomanometer) MISC Use 2 (two) times a day (Patient not taking: Reported on 2021) 1 each 0    chlorhexidine (PERIDEX) 0 12 % solution Apply 15 mL to the mouth or throat every 12 (twelve) hours 120 mL 0    escitalopram (Lexapro) 10 mg tablet Take 1 tablet (10 mg total) by mouth daily 60 tablet 0    famotidine (PEPCID) 20 mg/2 5 mL oral suspension Take 2 5 mL (20 mg total) by mouth 2 (two) times a day  0    furosemide (LASIX) 40 mg tablet Take 1 tablet (40 mg total) by mouth daily 30 tablet 0    insulin aspart (NovoLOG FlexPen) 100 UNIT/ML injection pen Inject 2 Units under the skin 3 (three) times a day with meals 15 mL 1    Insulin Pen Needle (Novofine Pen Needle) 32G X 6 MM MISC Use 3 (three) times a day with meals 100 each 2    Insulin Pen Needle (UNIFINE PENTIPS PLUS) 31G X 6 MM MISC 1 Device by Does not apply route 4 (four) times a day      ipratropium (ATROVENT) 0 02 % nebulizer solution Take 2 5 mL (0 5 mg total) by nebulization 3 (three) times a day  0    Lancets MISC 1 Device by Does not apply route 4 (four) times a day (Patient not taking: Reported on 7/13/2021)      levalbuterol (XOPENEX) 1 25 mg/0 5 mL nebulizer solution Take 0 5 mL (1 25 mg total) by nebulization 3 (three) times a day  0    LORazepam (Ativan) 1 mg tablet Take 0 5 tablets (0 5 mg total) by mouth 2 (two) times a day as needed for anxiety 10 tablet 0    losartan (COZAAR) 50 mg tablet Take 1 tablet (50 mg total) by mouth daily 90 tablet 0    melatonin 3 mg Take 2 tablets (6 mg total) by mouth daily at bedtime 180 tablet 0    metoprolol tartrate (LOPRESSOR) 25 mg tablet Take 1 tablet (25 mg total) by mouth every 12 (twelve) hours 180 tablet 0    polyethylene glycol (MIRALAX) 17 g packet Take 17 g by mouth daily  0    potassium chloride 10% oral solution Take 15 mL (20 mEq total) by mouth 2 (two) times a day for 1 dose 15 mL 0    pregabalin (LYRICA) 75 mg capsule TAKE ONE CAPSULE EVERY DAY 90 capsule 1    senna-docusate sodium (SENOKOT S) 8 6-50 mg per tablet Take 1 tablet by mouth daily at bedtime  0    spironolactone (ALDACTONE) 100 mg tablet Take 1 tablet (100 mg total) by mouth daily 90 tablet 0    ticagrelor (BRILINTA) 90 MG Take 1 tablet (90 mg total) by mouth every 12 (twelve) hours 180 tablet 0    traZODone (DESYREL) 50 mg tablet TAKE 0 5 TABLET (25MG) BY MOUTH NIGHTLY AS NEEDED FOR SLEEP  No current facility-administered medications for this visit  Allergies   Allergen Reactions    Metformin Diarrhea    Clonidine Rash     Patch        Review of Systems   Constitutional: Positive for fatigue  Respiratory: Negative for chest tightness  Breathing at baseline with trach in place per patient, currently on 10L without recurrent bleeding since D/C   Cardiovascular: Negative for chest pain  Gastrointestinal: Negative for vomiting  Musculoskeletal:        Fibromyalgia at baseline on lyrica   Psychiatric/Behavioral: Negative for self-injury  The patient is nervous/anxious  Video Exam    There were no vitals filed for this visit  Physical Exam   [Physical exam limited 2/2 telephone encounter, but no conversational dyspnea or distress noted]    I spent 10 minutes directly with the patient during this visit    VIRTUAL VISIT [address] verbally agrees to participate in [employer]  Pt is aware that [employer] could be limited without vital signs or the ability to perform a full hands-on physical exam  Kelli Red understands she or the provider may request at any time to terminate the video visit and request the patient to seek care or treatment in person

## 2022-04-07 NOTE — ASSESSMENT & PLAN NOTE
-Patient on lexapro for about 3 weeks now, still having a lot of anxiety, mostly related to her chronic health issues and breathing anxiety in the setting of tracheostomy  -Breathing has been back to baseline O2 needs since D/C from hospital per patient  -While awaiting lexapro to reach therapeutic efficacy, will continue ativan for now  -Reviewed PDMP, no red flags; refill sent to pharmacy  -Plan for patient to complete controlled substance agreement at next home visit  -Encourage patient to establish care with therapist to help her cope with stress/anxiety related to her health issues and her fear over uncertainty regarding duration of trach

## 2022-04-07 NOTE — ASSESSMENT & PLAN NOTE
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

## 2022-04-12 ENCOUNTER — TELEPHONE (OUTPATIENT)
Dept: FAMILY MEDICINE CLINIC | Facility: CLINIC | Age: 57
End: 2022-04-12

## 2022-04-12 NOTE — TELEPHONE ENCOUNTER
Medical Certification form needs to be filled out and signed  Forms will be placed in your folder in preceptor room  Please advise, thank you

## 2022-04-15 ENCOUNTER — PATIENT OUTREACH (OUTPATIENT)
Dept: FAMILY MEDICINE CLINIC | Facility: CLINIC | Age: 57
End: 2022-04-15

## 2022-04-28 ENCOUNTER — TELEPHONE (OUTPATIENT)
Dept: ENDOCRINOLOGY | Facility: CLINIC | Age: 57
End: 2022-04-28

## 2022-05-11 ENCOUNTER — HOSPITAL ENCOUNTER (EMERGENCY)
Facility: HOSPITAL | Age: 57
Discharge: HOME | End: 2022-05-11
Attending: EMERGENCY MEDICINE
Payer: COMMERCIAL

## 2022-05-11 ENCOUNTER — APPOINTMENT (EMERGENCY)
Dept: RADIOLOGY | Facility: HOSPITAL | Age: 57
End: 2022-05-11
Payer: COMMERCIAL

## 2022-05-11 VITALS
HEART RATE: 71 BPM | RESPIRATION RATE: 22 BRPM | WEIGHT: 190 LBS | DIASTOLIC BLOOD PRESSURE: 87 MMHG | SYSTOLIC BLOOD PRESSURE: 179 MMHG | BODY MASS INDEX: 28.14 KG/M2 | OXYGEN SATURATION: 100 % | TEMPERATURE: 97.5 F | HEIGHT: 69 IN

## 2022-05-11 DIAGNOSIS — J44.1 COPD EXACERBATION (HCC): ICD-10-CM

## 2022-05-11 DIAGNOSIS — R06.02 SHORTNESS OF BREATH: Primary | ICD-10-CM

## 2022-05-11 LAB
ALBUMIN SERPL BCP-MCNC: 3.5 G/DL (ref 3.5–5)
ALP SERPL-CCNC: 103 U/L (ref 34–104)
ALT SERPL W P-5'-P-CCNC: 8 U/L (ref 7–52)
ANION GAP SERPL CALCULATED.3IONS-SCNC: 13 MMOL/L (ref 4–13)
ARTERIAL PATENCY WRIST A: YES
AST SERPL W P-5'-P-CCNC: 8 U/L (ref 13–39)
ATRIAL RATE: 72 BPM
BASE EXCESS BLDA CALC-SCNC: -2.8 MMOL/L
BASOPHILS # BLD AUTO: 0.04 THOUSANDS/ΜL (ref 0–0.1)
BASOPHILS NFR BLD AUTO: 0 % (ref 0–1)
BILIRUB SERPL-MCNC: 0.65 MG/DL (ref 0.2–1)
BNP SERPL-MCNC: 342 PG/ML (ref 0–100)
BUN SERPL-MCNC: 15 MG/DL (ref 5–25)
CALCIUM SERPL-MCNC: 9 MG/DL (ref 8.4–10.2)
CHLORIDE SERPL-SCNC: 98 MMOL/L (ref 96–108)
CO2 SERPL-SCNC: 26 MMOL/L (ref 21–32)
CREAT SERPL-MCNC: 1 MG/DL (ref 0.6–1.3)
EOSINOPHIL # BLD AUTO: 0.12 THOUSAND/ΜL (ref 0–0.61)
EOSINOPHIL NFR BLD AUTO: 1 % (ref 0–6)
ERYTHROCYTE [DISTWIDTH] IN BLOOD BY AUTOMATED COUNT: 17.2 % (ref 11.6–15.1)
GFR SERPL CREATININE-BSD FRML MDRD: 63 ML/MIN/1.73SQ M
GLUCOSE SERPL-MCNC: 470 MG/DL (ref 65–140)
HCO3 BLDA-SCNC: 19.8 MMOL/L (ref 22–28)
HCT VFR BLD AUTO: 33.1 % (ref 34.8–46.1)
HGB BLD-MCNC: 10.2 G/DL (ref 11.5–15.4)
IMM GRANULOCYTES # BLD AUTO: 0.03 THOUSAND/UL (ref 0–0.2)
IMM GRANULOCYTES NFR BLD AUTO: 0 % (ref 0–2)
LACTATE SERPL-SCNC: 2.1 MMOL/L (ref 0.5–2)
LYMPHOCYTES # BLD AUTO: 1.29 THOUSANDS/ΜL (ref 0.6–4.47)
LYMPHOCYTES NFR BLD AUTO: 13 % (ref 14–44)
MCH RBC QN AUTO: 24.9 PG (ref 26.8–34.3)
MCHC RBC AUTO-ENTMCNC: 30.8 G/DL (ref 31.4–37.4)
MCV RBC AUTO: 81 FL (ref 82–98)
MONOCYTES # BLD AUTO: 0.63 THOUSAND/ΜL (ref 0.17–1.22)
MONOCYTES NFR BLD AUTO: 7 % (ref 4–12)
NEUTROPHILS # BLD AUTO: 7.53 THOUSANDS/ΜL (ref 1.85–7.62)
NEUTS SEG NFR BLD AUTO: 79 % (ref 43–75)
NRBC BLD AUTO-RTO: 0 /100 WBCS
O2 CT BLDA-SCNC: 14.7 ML/DL (ref 16–23)
OXYHGB MFR BLDA: 97.6 % (ref 94–97)
P AXIS: 27 DEGREES
PCO2 BLDA: 27.7 MM HG (ref 36–44)
PH BLDA: 7.47 [PH] (ref 7.35–7.45)
PLATELET # BLD AUTO: 438 THOUSANDS/UL (ref 149–390)
PMV BLD AUTO: 10.7 FL (ref 8.9–12.7)
PO2 BLDA: 113 MM HG (ref 75–129)
POTASSIUM SERPL-SCNC: 3.5 MMOL/L (ref 3.5–5.3)
PR INTERVAL: 184 MS
PROT SERPL-MCNC: 7.4 G/DL (ref 6.4–8.4)
QRS AXIS: -56 DEGREES
QRSD INTERVAL: 134 MS
QT INTERVAL: 500 MS
QTC INTERVAL: 547 MS
RBC # BLD AUTO: 4.1 MILLION/UL (ref 3.81–5.12)
SODIUM SERPL-SCNC: 137 MMOL/L (ref 135–147)
SPECIMEN SOURCE: ABNORMAL
T WAVE AXIS: 10 DEGREES
VENTRICULAR RATE: 72 BPM
WBC # BLD AUTO: 9.64 THOUSAND/UL (ref 4.31–10.16)

## 2022-05-11 PROCEDURE — 87040 BLOOD CULTURE FOR BACTERIA: CPT | Performed by: EMERGENCY MEDICINE

## 2022-05-11 PROCEDURE — 85025 COMPLETE CBC W/AUTO DIFF WBC: CPT | Performed by: EMERGENCY MEDICINE

## 2022-05-11 PROCEDURE — 93005 ELECTROCARDIOGRAM TRACING: CPT

## 2022-05-11 PROCEDURE — 80053 COMPREHEN METABOLIC PANEL: CPT | Performed by: EMERGENCY MEDICINE

## 2022-05-11 PROCEDURE — 36415 COLL VENOUS BLD VENIPUNCTURE: CPT | Performed by: EMERGENCY MEDICINE

## 2022-05-11 PROCEDURE — 93010 ELECTROCARDIOGRAM REPORT: CPT | Performed by: INTERNAL MEDICINE

## 2022-05-11 PROCEDURE — 94760 N-INVAS EAR/PLS OXIMETRY 1: CPT

## 2022-05-11 PROCEDURE — 94640 AIRWAY INHALATION TREATMENT: CPT

## 2022-05-11 PROCEDURE — 83605 ASSAY OF LACTIC ACID: CPT | Performed by: EMERGENCY MEDICINE

## 2022-05-11 PROCEDURE — 99285 EMERGENCY DEPT VISIT HI MDM: CPT | Performed by: EMERGENCY MEDICINE

## 2022-05-11 PROCEDURE — 82805 BLOOD GASES W/O2 SATURATION: CPT | Performed by: EMERGENCY MEDICINE

## 2022-05-11 PROCEDURE — 94664 DEMO&/EVAL PT USE INHALER: CPT

## 2022-05-11 PROCEDURE — 83880 ASSAY OF NATRIURETIC PEPTIDE: CPT | Performed by: EMERGENCY MEDICINE

## 2022-05-11 PROCEDURE — 99285 EMERGENCY DEPT VISIT HI MDM: CPT

## 2022-05-11 PROCEDURE — 96374 THER/PROPH/DIAG INJ IV PUSH: CPT

## 2022-05-11 PROCEDURE — 71045 X-RAY EXAM CHEST 1 VIEW: CPT

## 2022-05-11 RX ORDER — METHYLPREDNISOLONE SODIUM SUCCINATE 125 MG/2ML
125 INJECTION, POWDER, LYOPHILIZED, FOR SOLUTION INTRAMUSCULAR; INTRAVENOUS ONCE
Status: COMPLETED | OUTPATIENT
Start: 2022-05-11 | End: 2022-05-11

## 2022-05-11 RX ORDER — PREDNISONE 10 MG/1
TABLET ORAL
Qty: 20 TABLET | Refills: 0 | Status: SHIPPED | OUTPATIENT
Start: 2022-05-11 | End: 2022-05-23

## 2022-05-11 RX ORDER — IPRATROPIUM BROMIDE AND ALBUTEROL SULFATE 2.5; .5 MG/3ML; MG/3ML
3 SOLUTION RESPIRATORY (INHALATION) ONCE
Status: COMPLETED | OUTPATIENT
Start: 2022-05-11 | End: 2022-05-11

## 2022-05-11 RX ADMIN — METHYLPREDNISOLONE SODIUM SUCCINATE 125 MG: 125 INJECTION, POWDER, FOR SOLUTION INTRAMUSCULAR; INTRAVENOUS at 15:30

## 2022-05-11 RX ADMIN — IPRATROPIUM BROMIDE AND ALBUTEROL SULFATE 3 ML: 2.5; .5 SOLUTION RESPIRATORY (INHALATION) at 15:30

## 2022-05-11 RX ADMIN — IPRATROPIUM BROMIDE AND ALBUTEROL SULFATE 3 ML: 2.5; .5 SOLUTION RESPIRATORY (INHALATION) at 15:41

## 2022-05-11 NOTE — ED PROVIDER NOTES
[facility]  EMERGENCY DEPARTMENT NOTE      Pt Name: Nancy Horton  MRN: [id number]  YOB: 1965  Date of evaluation:  5/11/2022  Provider: Jana Barone MD    CHIEF COMPLAINT     Chief Complaint   Patient presents with    Shortness of Breath     SOB starting approx  1400, hx trach, also c/o abdominal pain and HA         HISTORY OF PRESENT ILLNESS  (LOCATION/SYMPTOM, TIMING/ONSET, CONTEXT/SETTING, QUALITY, DURATION, MODIFYING FACTORS, SEVERITY)   Note limiting factors  Nancy Horton is a 55 y o  female who presents to the emergency department for COPD exacerbation and dyspnea  At around 1400 this afternoon she began to experience sxs  She took an albuterol BTx at that time, but did not get any real relief  HPI    Nursing notes were reviewed  REVIEW OF SYSTEMS  (2-9 systems for level 4, ten or more for level 5)     Review of Systems   Constitutional: Negative for chills, fatigue and fever  HENT: Negative for ear pain and sore throat  Eyes: Negative for pain and visual disturbance  Respiratory: Positive for cough, chest tightness and shortness of breath  Cardiovascular: Negative for chest pain and palpitations  Gastrointestinal: Negative for abdominal pain and vomiting  Genitourinary: Negative for dysuria and hematuria  Musculoskeletal: Negative for arthralgias and back pain  Skin: Negative for color change and rash  Neurological: Negative for seizures and syncope  Psychiatric/Behavioral: The patient is nervous/anxious  All other systems reviewed and are negative  PAST MEDICAL HISTORY     Past Medical History:   Diagnosis Date    Anxiety     Aortic aneurysm ([facility] [address] )     Arthritis     Depression     Diabetes mellitus (HCC)     Fibromyalgia     GERD (gastroesophageal reflux disease)     GERD (gastroesophageal reflux disease)     H/O cardiovascular stress test 09/2018    no ischemia  EF 70%      H/O echocardiogram 2019    EF 60%  Mild LVH  trivial effusion   Hyperlipidemia     Hypertension     Migraines     Psychiatric disorder     anxiety    Uncontrolled hypertension 2015    Last Assessment & Plan:  BP today above goal <140/90, apparently asymptomatic  Prior BP elevations were attributed to not taking medication  Consider increased medication if persistent on f/u  SURGICAL HISTORY     Past Surgical History:   Procedure Laterality Date    BACK SURGERY      Lumbar epidural steroid injection    CARDIAC CATHETERIZATION N/A 10/22/2021    Procedure: Cardiac pci;  Surgeon: Dk Capellan MD;  [facility]; Service: Cardiology    CARDIAC CATHETERIZATION  10/22/2021    Procedure: Cardiac catheterization;  Surgeon: Dk Capellan MD;  [facility]; Service: Cardiology    CARDIAC CATHETERIZATION Left 10/27/2021    Procedure: Cardiac Left Heart Cath;  Surgeon: Tao Stern MD;  [facility]; Service: Cardiology    CARDIAC CATHETERIZATION N/A 10/27/2021    Procedure: Cardiac pci;  Surgeon: Tao Stern MD;  [facility]; Service: Cardiology    CARDIAC CATHETERIZATION N/A 10/28/2021    Procedure: Cardiac pci;  Surgeon: Diana Horn DO;  [facility]: [facility];   Service: Cardiology    CARPAL TUNNEL RELEASE Left      SECTION      CHOLECYSTECTOMY      COLONOSCOPY      incomplete    COLONOSCOPY      EYE SURGERY      HYSTERECTOMY      Total    OVARIAN CYST REMOVAL      PEG W/TRACHEOSTOMY PLACEMENT N/A 2021    Procedure: TRACHEOSTOMY WITH INSERTION PEG TUBE;  Surgeon: Eric Koyanagi To, DO;  Location: [facility] MAIN OR;  Service: General    TUBAL LIGATION      UPPER GASTROINTESTINAL ENDOSCOPY           CURRENT MEDICATIONS     Previous Medications    ACETAMINOPHEN (TYLENOL) 160 MG/5 ML SUSPENSION    30 4 mL (975 mg total) by Per G Tube route every 6 (six) hours as needed for mild pain or fever    ALBUTEROL (2 5 MG/3 ML) 0 083 % NEBULIZER SOLUTION    Take 3 mL (2 5 mg total) by nebulization every 4 (four) hours as needed for wheezing or shortness of breath    AMIODARONE 100 MG TABLET    Take 1 tablet (100 mg total) by mouth daily with breakfast    APIXABAN (ELIQUIS) 5 MG    Take 1 tablet (5 mg total) by mouth 2 (two) times a day    ATORVASTATIN (LIPITOR) 40 MG TABLET    Take 1 tablet (40 mg total) by mouth daily    BASAGLAR KWIKPEN 100 UNITS/ML INJECTION PEN    INJECT 8 UNITS UNDER THE SKIN DAILY    BLOOD PRESSURE MONITORING (SPHYGMOMANOMETER) MISC    Use 2 (two) times a day    CHLORHEXIDINE (PERIDEX) 0 12 % SOLUTION    Apply 15 mL to the mouth or throat every 12 (twelve) hours    ESCITALOPRAM (LEXAPRO) 10 MG TABLET    Take 1 tablet (10 mg total) by mouth daily    FAMOTIDINE (PEPCID) 20 MG/2 5 ML ORAL SUSPENSION    Take 2 5 mL (20 mg total) by mouth 2 (two) times a day    FUROSEMIDE (LASIX) 40 MG TABLET    Take 1 tablet (40 mg total) by mouth daily    INSULIN ASPART (NOVOLOG FLEXPEN) 100 UNIT/ML INJECTION PEN    Inject 2 Units under the skin 3 (three) times a day with meals    INSULIN PEN NEEDLE (NOVOFINE PEN NEEDLE) 32G X 6 MM MISC    Use 3 (three) times a day with meals    INSULIN PEN NEEDLE (UNIFINE PENTIPS PLUS) 31G X 6 MM MISC    1 Device by Does not apply route 4 (four) times a day    IPRATROPIUM (ATROVENT) 0 02 % NEBULIZER SOLUTION    Take 2 5 mL (0 5 mg total) by nebulization 3 (three) times a day    LANCETS MISC    1 Device by Does not apply route 4 (four) times a day    LEVALBUTEROL (XOPENEX) 1 25 MG/0 5 ML NEBULIZER SOLUTION    Take 0 5 mL (1 25 mg total) by nebulization 3 (three) times a day    LORAZEPAM (ATIVAN) 1 MG TABLET    Take 0 5 tablets (0 5 mg total) by mouth 2 (two) times a day as needed for anxiety    LOSARTAN (COZAAR) 50 MG TABLET    Take 1 tablet (50 mg total) by mouth daily    MELATONIN 3 MG    Take 2 tablets (6 mg total) by mouth daily at bedtime    METOPROLOL TARTRATE (LOPRESSOR) 25 MG TABLET    Take 1 tablet (25 mg total) by mouth every 12 (twelve) hours    POLYETHYLENE GLYCOL (MIRALAX) 17 G PACKET    Take 17 g by mouth daily    POTASSIUM CHLORIDE 10% ORAL SOLUTION    Take 15 mL (20 mEq total) by mouth 2 (two) times a day for 1 dose    PREGABALIN (LYRICA) 75 MG CAPSULE    TAKE ONE CAPSULE EVERY DAY    SENNA-DOCUSATE SODIUM (SENOKOT S) 8 6-50 MG PER TABLET    Take 1 tablet by mouth daily at bedtime    SPIRONOLACTONE (ALDACTONE) 100 MG TABLET    Take 1 tablet (100 mg total) by mouth daily    TICAGRELOR (BRILINTA) 90 MG    Take 1 tablet (90 mg total) by mouth every 12 (twelve) hours    TRAZODONE (DESYREL) 50 MG TABLET    TAKE 0 5 TABLET (25MG) BY MOUTH NIGHTLY AS NEEDED FOR SLEEP  ALLERGIES   Metformin and Clonidine      SOCIAL HISTORY     Social History     Socioeconomic History    Marital status: Legally      Spouse name: None    Number of children: None    Years of education: None    Highest education level: None   Occupational History    None   Tobacco Use    Smoking status: Former Smoker     Packs/day: 1 00     Years: 30 00     Pack years: 30 00     Types: Cigarettes    Smokeless tobacco: Never Used   Vaping Use    Vaping Use: Never used   Substance and Sexual Activity    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       Drug use: Yes     Types: Marijuana     Comment: medical; seldom use    Sexual activity: Yes     Birth control/protection: Female Sterilization     Comment: Monogamous relationship with monogamous partner   Other Topics Concern    None   Social History Narrative    Most recent tobacco use screenin2019    Do you currently or have you served in the [employer]: No    Were you activated, into active duty, as a member of the [employer] or as a Reservist: No    Advance directive: No    Chewing tobacco: none    Alcohol intake: None    Marital status: Single    Live alone or with others: with others    Family history of heart disease:    aortic aneurysm    High cholesterol: Yes    High blood pressure: Yes    Exercise level: Heavy    Overweight: Yes    Obese: Yes    Diabetes: Yes    General stress level: High    Diet: Regular    Caffeine intake: Occasional    Guns present in home: No    Seat belts used routinely: Yes    Sexual orientation: Heterosexual    Sunscreen used routinely: No    Seat belt/car seat used routinely: Yes    Exercise-How often do you exercise: as tolerated    Do you have regular medical check ups: Yes    Do you have regular dental check ups: Yes    Illicit drugs-years of use:    recovery 23 years - HX of     Social Determinants of Health     Financial Resource Strain: Not on file   Food Insecurity: Food Insecurity Present    Worried About Running Out of Food in the Last Year: Sometimes true    Lela of Food in the Last Year: Sometimes true   Transportation Needs: No Transportation Needs    Lack of Transportation (Medical): No    Lack of Transportation (Non-Medical): No   Physical Activity: Not on file   Stress: Not on file   Social Connections: Not on file   Intimate Partner Violence: Not on file   Housing Stability: Low Risk     Unable to Pay for Housing in the Last Year: No    Number of Places Lived in the Last Year: 1    Unstable Housing in the Last Year: No       Screenings       PHYSICAL EXAM  (Up to 7 for level 4, 8 or more for level 5)     Physical Exam  Vitals and nursing note reviewed  Constitutional:       Appearance: Normal appearance  HENT:      Head: Normocephalic and atraumatic  Nose: Nose normal       Mouth/Throat:      Mouth: Mucous membranes are moist       Pharynx: Oropharynx is clear  Eyes:      Extraocular Movements: Extraocular movements intact  Pupils: Pupils are equal, round, and reactive to light  Neck:      Trachea: Tracheostomy present  Comments: Patient has a trach in place  Cardiovascular:      Rate and Rhythm: Normal rate and regular rhythm        Heart sounds: No murmur heard  Pulmonary:      Effort: Tachypnea and accessory muscle usage present  Breath sounds: No decreased breath sounds, wheezing, rhonchi or rales  Musculoskeletal:         General: No swelling or tenderness  Normal range of motion  Skin:     General: Skin is warm and dry  Capillary Refill: Capillary refill takes less than 2 seconds  Neurological:      General: No focal deficit present  Mental Status: She is alert and oriented to person, place, and time  Cranial Nerves: No cranial nerve deficit  Psychiatric:         Attention and Perception: Attention and perception normal          Mood and Affect: Mood is anxious  Speech: Speech normal          Behavior: Behavior normal  Behavior is cooperative  Diagnostic results   Radiology:  Non plain film images suggest CT, ultrasound and MRI are read by the radiologist   Plain radiographic images are visualized and preliminarily interpreted by the emergency physician see below findings:    Mildly enlarged cardiac silhouette, no PNA, or PTX  I do appreciated an acute process     XR chest portable   ED Interpretation   Mildly enlarged cardiac silhouette, no pneu do not appreciate an acute process  monia, no pneumothorax          EKG:  All EKGs are interpreted by the emergency department physician who either signs or cosigned discharge in the absence of a cardiologist     Normal sinus rhythm with a rate of 72, left axis deviation, right bundle-branch block is present, there are no acute ST elevations or depressions noted  This EKG is comparable and similar to the one done on 03/25/2022      Procedures          LABS:  Results Reviewed     Procedure Component Value Units Date/Time    Lactic acid, plasma [[id number]]  (Abnormal) Collected: 05/11/22 1530    Lab Status: Final result Specimen: Blood from Arm, Right Updated: 05/11/22 1629     LACTIC ACID 2 1 mmol/L     Narrative:      Result may be elevated if tourniquet was used during collection  Lactic acid 2 Hours [[id number]]     Lab Status: No result Specimen: Blood     Blood gas, arterial [[id number]]  (Abnormal) Collected: 05/11/22 1613    Lab Status: Final result Specimen: Blood, Arterial from Radial, Right Updated: 05/11/22 1629     pH, Arterial 7 473     pCO2, Arterial 27 7 mm Hg      pO2, Arterial 113 0 mm Hg      HCO3, Arterial 19 8 mmol/L      Base Excess, Arterial -2 8 mmol/L      O2 Content, Arterial 14 7 mL/dL      O2 HGB,Arterial  97 6 %      SOURCE Radial, Right     REGI TEST Yes    B-Type Natriuretic Peptide(BNP) [[id number]]  (Abnormal) Collected: 05/11/22 1536    Lab Status: Final result Specimen: Blood from Arm, Right Updated: 05/11/22 1628      pg/mL     Comprehensive metabolic panel [[id number]]  (Abnormal) Collected: 05/11/22 1536    Lab Status: Final result Specimen: Blood from Arm, Right Updated: 05/11/22 1600     Sodium 137 mmol/L      Potassium 3 5 mmol/L      Chloride 98 mmol/L      CO2 26 mmol/L      ANION GAP 13 mmol/L      BUN 15 mg/dL      Creatinine 1 00 mg/dL      Glucose 470 mg/dL      Calcium 9 0 mg/dL      AST 8 U/L      ALT 8 U/L      Alkaline Phosphatase 103 U/L      Total Protein 7 4 g/dL      Albumin 3 5 g/dL      Total Bilirubin 0 65 mg/dL      eGFR 63 ml/min/1 73sq m     Narrative:      [place] guidelines for Chronic Kidney Disease (CKD):     Stage 1 with normal or high GFR (GFR > 90 mL/min/1 73 square meters)    Stage 2 Mild CKD (GFR = 60-89 mL/min/1 73 square meters)    Stage 3A Moderate CKD (GFR = 45-59 mL/min/1 73 square meters)    Stage 3B Moderate CKD (GFR = 30-44 mL/min/1 73 square meters)    Stage 4 Severe CKD (GFR = 15-29 mL/min/1 73 square meters)    Stage 5 End Stage CKD (GFR <15 mL/min/1 73 square meters)  Note: GFR calculation is accurate only with a steady state creatinine    Blood culture #1 [[id number]] Collected: 05/11/22 1549    Lab Status:  In process Specimen: Blood from Arm, Left Updated: 05/11/22 1554    Blood culture #2 [[id number]] Collected: 05/11/22 1530    Lab Status: In process Specimen: Blood from Arm, Right Updated: 05/11/22 1554    CBC and differential [[id number]]  (Abnormal) Collected: 05/11/22 1536    Lab Status: Final result Specimen: Blood from Arm, Right Updated: 05/11/22 1543     WBC 9 64 Thousand/uL      RBC 4 10 Million/uL      Hemoglobin 10 2 g/dL      Hematocrit 33 1 %      MCV 81 fL      MCH 24 9 pg      MCHC 30 8 g/dL      RDW 17 2 %      MPV 10 7 fL      Platelets 230 Thousands/uL      nRBC 0 /100 WBCs      Neutrophils Relative 79 %      Immat GRANS % 0 %      Lymphocytes Relative 13 %      Monocytes Relative 7 %      Eosinophils Relative 1 %      Basophils Relative 0 %      Neutrophils Absolute 7 53 Thousands/µL      Immature Grans Absolute 0 03 Thousand/uL      Lymphocytes Absolute 1 29 Thousands/µL      Monocytes Absolute 0 63 Thousand/µL      Eosinophils Absolute 0 12 Thousand/µL      Basophils Absolute 0 04 Thousands/µL           All other labs were within normal range or not returned as of this dictation  EMERGENCY DEPARTMENT COURSE AND DIFFERENTIAL DIAGNOSIS/MDM:   VITALS:   Vitals:    05/11/22 1507 05/11/22 1513 05/11/22 1530   Pulse: 70     Resp: (!) 24     Temp:  97 5 °F (36 4 °C)    TempSrc:  Oral    SpO2: 97%  98%   Weight: 86 2 kg (190 lb)     Height: 5' 9" (1 753 m)         MDM  Number of Diagnoses or Management Options  [facility] new and requires workup  Shortness of breath: new and requires workup  Diagnosis management comments: Patient presented with shortness of breath  She had an SpO2 of greater than 95% once we can get a good waveform on the pulse ox  The workup has essentially been unremarkable  Her trach insert was clogged  The respiratory therapist changed it out and cleaned the tracheostomy  I suspect that patient began to have a hard time breathing through the trach    At this point she got extremely anxious and this is what made her feel like she was having hard time breathing  She presented this exact same way 2 months ago when I saw her  Patient was started on DuoNebs here in the ED  I also gave her Solu-Medrol  She has an underlying history of COPD and continues to smoke  Someone had treated her for the COPD exacerbation  She looks significantly better at this time  Her lactate was a bit elevated, but I do not suspect this is due to an infectious process  Her ABG showed that her CO2 level was down  I believe that this is going to be due to some hyperventilation  At this time her vital signs are stable  Tracheostomy is been cleaned out  However follow-up with her primary care  Patient was told that she could return here if she has any further issues or new complaints  Patient agrees and understands discharge planning  Patient Progress  Patient progress: stable      Reassessment:  ED Course as of 05/11/22 1827   Wed May 11, 2022   1617 The patient looks well at this time  Respiratory was able to pull the trach insert  Within it there was a large mucous plug present  Medications   methylPREDNISolone sodium succinate (Solu-MEDROL) injection 125 mg (125 mg Intravenous Given 5/11/22 1530)   ipratropium-albuterol (DUO-NEB) 0 5-2 5 mg/3 mL inhalation solution 3 mL (3 mL Nebulization Given 5/11/22 1530)   ipratropium-albuterol (DUO-NEB) 0 5-2 5 mg/3 mL inhalation solution 3 mL (3 mL Nebulization Given 5/11/22 1541)       CONSULTS:    Unless otherwise noted below none  FINAL IMPRESSION     1  Shortness of breath    2   COPD exacerbation [facility])            DISPOSITION/PLAN         PATIENT REFERRED TO:  Luis Broussard MD  [address] [facility]   [address] [place] [address]  [phone]    Call in 2 days          DISCHARGE MEDICATIONS:  New Prescriptions    PREDNISONE 10 MG TABLET    Take 4 tabs PO Q day x 5 days           (Please note that the portions of the snow were completed with voice recognition program   Efforts were made to admit the dictations but occasionally words are missed transcribed )    Nikos Marcos MD (electronically signed) emergency physician                             Nikos Marcos MD  05/11/22 0775

## 2022-05-11 NOTE — ED NOTES
Inner trach cannula removed  Large amount of mucus blocking inner cannula noted  Resp cleaned trach for patient  Patient appears more calm and respirations are now easy and unlabored        Joanna Burton RN  05/11/22 2539

## 2022-05-11 NOTE — ED NOTES
Received patient in acute resp distress  Dr Clay Blackwell at bedside  IV started and blood work obtained  Resp at bedside for neb treatment  Patient's trach set up noted to be old  Respiratory changed out all equipment         Rylie Cordova RN  05/11/22 4586

## 2022-05-11 NOTE — Clinical Note
accompanied Dulce Diaz to the emergency department on 5/11/2022  Return date if applicable: 28/76/4987        If you have any questions or concerns, please don't hesitate to call        Darcie Olson RN

## 2022-05-11 NOTE — ED NOTES
Patient no longer in any distress  Patient will be discharged home        Juanis Carlson RN  05/11/22 9251

## 2022-05-13 ENCOUNTER — PATIENT OUTREACH (OUTPATIENT)
Dept: FAMILY MEDICINE CLINIC | Facility: CLINIC | Age: 57
End: 2022-05-13

## 2022-05-13 NOTE — PROGRESS NOTES
Pt is BPCI  Outreach attempted after receiving ADT alert  Pt unable to speak on phone  Outreach to patients son  LVM requesting return call  CM contact information provided

## 2022-05-14 ENCOUNTER — HOSPITAL ENCOUNTER (EMERGENCY)
Facility: HOSPITAL | Age: 57
Discharge: HOME | End: 2022-05-14
Attending: EMERGENCY MEDICINE
Payer: COMMERCIAL

## 2022-05-14 ENCOUNTER — APPOINTMENT (EMERGENCY)
Dept: RADIOLOGY | Facility: HOSPITAL | Age: 57
End: 2022-05-14
Payer: COMMERCIAL

## 2022-05-14 VITALS
RESPIRATION RATE: 24 BRPM | SYSTOLIC BLOOD PRESSURE: 178 MMHG | OXYGEN SATURATION: 100 % | DIASTOLIC BLOOD PRESSURE: 86 MMHG | HEART RATE: 66 BPM | TEMPERATURE: 98.6 F

## 2022-05-14 DIAGNOSIS — E11.65 HYPERGLYCEMIA DUE TO DIABETES MELLITUS (HCC): ICD-10-CM

## 2022-05-14 DIAGNOSIS — F41.9 ANXIETY: ICD-10-CM

## 2022-05-14 DIAGNOSIS — J02.9 SORE THROAT: Primary | ICD-10-CM

## 2022-05-14 LAB
2HR DELTA HS TROPONIN: 1 NG/L
ALBUMIN SERPL BCP-MCNC: 3.5 G/DL (ref 3.5–5)
ALP SERPL-CCNC: 113 U/L (ref 34–104)
ALT SERPL W P-5'-P-CCNC: 8 U/L (ref 7–52)
ANION GAP SERPL CALCULATED.3IONS-SCNC: 9 MMOL/L (ref 4–13)
AST SERPL W P-5'-P-CCNC: 8 U/L (ref 13–39)
BASE EX.OXY STD BLDV CALC-SCNC: 54.1 % (ref 60–80)
BASE EXCESS BLDV CALC-SCNC: 3.9 MMOL/L
BASOPHILS # BLD AUTO: 0.03 THOUSANDS/ΜL (ref 0–0.1)
BASOPHILS NFR BLD AUTO: 0 % (ref 0–1)
BILIRUB SERPL-MCNC: 0.59 MG/DL (ref 0.2–1)
BUN SERPL-MCNC: 14 MG/DL (ref 5–25)
CALCIUM SERPL-MCNC: 8.7 MG/DL (ref 8.4–10.2)
CARDIAC TROPONIN I PNL SERPL HS: 16 NG/L
CARDIAC TROPONIN I PNL SERPL HS: 17 NG/L
CHLORIDE SERPL-SCNC: 97 MMOL/L (ref 96–108)
CO2 SERPL-SCNC: 29 MMOL/L (ref 21–32)
CREAT SERPL-MCNC: 0.86 MG/DL (ref 0.6–1.3)
EOSINOPHIL # BLD AUTO: 0.13 THOUSAND/ΜL (ref 0–0.61)
EOSINOPHIL NFR BLD AUTO: 1 % (ref 0–6)
ERYTHROCYTE [DISTWIDTH] IN BLOOD BY AUTOMATED COUNT: 16.7 % (ref 11.6–15.1)
GFR SERPL CREATININE-BSD FRML MDRD: 76 ML/MIN/1.73SQ M
GLUCOSE SERPL-MCNC: 465 MG/DL (ref 65–140)
HCO3 BLDV-SCNC: 28.3 MMOL/L (ref 24–30)
HCT VFR BLD AUTO: 35.2 % (ref 34.8–46.1)
HGB BLD-MCNC: 10.6 G/DL (ref 11.5–15.4)
IMM GRANULOCYTES # BLD AUTO: 0.05 THOUSAND/UL (ref 0–0.2)
IMM GRANULOCYTES NFR BLD AUTO: 1 % (ref 0–2)
LYMPHOCYTES # BLD AUTO: 1.48 THOUSANDS/ΜL (ref 0.6–4.47)
LYMPHOCYTES NFR BLD AUTO: 16 % (ref 14–44)
MCH RBC QN AUTO: 24.3 PG (ref 26.8–34.3)
MCHC RBC AUTO-ENTMCNC: 30.1 G/DL (ref 31.4–37.4)
MCV RBC AUTO: 81 FL (ref 82–98)
MONOCYTES # BLD AUTO: 0.59 THOUSAND/ΜL (ref 0.17–1.22)
MONOCYTES NFR BLD AUTO: 6 % (ref 4–12)
NEUTROPHILS # BLD AUTO: 7.26 THOUSANDS/ΜL (ref 1.85–7.62)
NEUTS SEG NFR BLD AUTO: 76 % (ref 43–75)
NRBC BLD AUTO-RTO: 0 /100 WBCS
O2 CT BLDV-SCNC: 8.7 ML/DL
PCO2 BLDV: 41.9 MM HG (ref 42–50)
PH BLDV: 7.45 [PH] (ref 7.3–7.4)
PLATELET # BLD AUTO: 488 THOUSANDS/UL (ref 149–390)
PMV BLD AUTO: 10.2 FL (ref 8.9–12.7)
PO2 BLDV: 28.3 MM HG (ref 35–45)
POTASSIUM SERPL-SCNC: 3.4 MMOL/L (ref 3.5–5.3)
PROT SERPL-MCNC: 7.3 G/DL (ref 6.4–8.4)
RBC # BLD AUTO: 4.37 MILLION/UL (ref 3.81–5.12)
SODIUM SERPL-SCNC: 135 MMOL/L (ref 135–147)
WBC # BLD AUTO: 9.54 THOUSAND/UL (ref 4.31–10.16)

## 2022-05-14 PROCEDURE — 71045 X-RAY EXAM CHEST 1 VIEW: CPT

## 2022-05-14 PROCEDURE — 96361 HYDRATE IV INFUSION ADD-ON: CPT

## 2022-05-14 PROCEDURE — 80053 COMPREHEN METABOLIC PANEL: CPT | Performed by: EMERGENCY MEDICINE

## 2022-05-14 PROCEDURE — 93005 ELECTROCARDIOGRAM TRACING: CPT

## 2022-05-14 PROCEDURE — 99284 EMERGENCY DEPT VISIT MOD MDM: CPT | Performed by: EMERGENCY MEDICINE

## 2022-05-14 PROCEDURE — 96374 THER/PROPH/DIAG INJ IV PUSH: CPT

## 2022-05-14 PROCEDURE — 85025 COMPLETE CBC W/AUTO DIFF WBC: CPT | Performed by: EMERGENCY MEDICINE

## 2022-05-14 PROCEDURE — 82805 BLOOD GASES W/O2 SATURATION: CPT | Performed by: EMERGENCY MEDICINE

## 2022-05-14 PROCEDURE — 99285 EMERGENCY DEPT VISIT HI MDM: CPT

## 2022-05-14 PROCEDURE — 0241U HB NFCT DS VIR RESP RNA 4 TRGT: CPT | Performed by: EMERGENCY MEDICINE

## 2022-05-14 PROCEDURE — 36415 COLL VENOUS BLD VENIPUNCTURE: CPT | Performed by: EMERGENCY MEDICINE

## 2022-05-14 PROCEDURE — 84484 ASSAY OF TROPONIN QUANT: CPT | Performed by: EMERGENCY MEDICINE

## 2022-05-14 RX ORDER — LORAZEPAM 2 MG/ML
0.5 INJECTION INTRAMUSCULAR ONCE
Status: COMPLETED | OUTPATIENT
Start: 2022-05-14 | End: 2022-05-14

## 2022-05-14 RX ADMIN — SODIUM CHLORIDE 1000 ML: 0.9 INJECTION, SOLUTION INTRAVENOUS at 20:45

## 2022-05-14 RX ADMIN — LORAZEPAM 0.5 MG: 2 INJECTION INTRAMUSCULAR; INTRAVENOUS at 19:59

## 2022-05-15 LAB
ATRIAL RATE: 78 BPM
FLUAV RNA RESP QL NAA+PROBE: NEGATIVE
FLUBV RNA RESP QL NAA+PROBE: NEGATIVE
P AXIS: 53 DEGREES
PR INTERVAL: 144 MS
QRS AXIS: -68 DEGREES
QRSD INTERVAL: 124 MS
QT INTERVAL: 436 MS
QTC INTERVAL: 497 MS
RSV RNA RESP QL NAA+PROBE: NEGATIVE
SARS-COV-2 RNA RESP QL NAA+PROBE: NEGATIVE
T WAVE AXIS: 20 DEGREES
VENTRICULAR RATE: 78 BPM

## 2022-05-15 PROCEDURE — 93010 ELECTROCARDIOGRAM REPORT: CPT | Performed by: INTERNAL MEDICINE

## 2022-05-15 NOTE — DISCHARGE INSTRUCTIONS
Your exam and testing was very reassuring  Please follow up with your doctor in the next day or tow  Return to the ER if you have worsening symptoms or any new concerns  We are always here and always happy to re-evaluate!

## 2022-05-15 NOTE — ED PROVIDER NOTES
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  Presents with anxiety and SOB as well as sore throat  Denies f/c/n/v/d  History provided by:  Medical records and patient   used: No    Shortness of Breath  Severity:  Moderate  Onset quality:  Gradual  Timing:  Constant  Progression:  Unchanged  Chronicity:  New  Context: not activity and not URI    Relieved by:  Nothing  Worsened by:  Nothing  Ineffective treatments:  None tried  Associated symptoms: no abdominal pain, no chest pain, no diaphoresis, no fever, no rash, no sore throat, no sputum production and no vomiting    Risk factors: no prolonged immobilization and no recent surgery        Prior to Admission Medications   Prescriptions Last Dose Informant Patient Reported? Taking?    Basaglar KwikPen 100 units/mL injection pen   No No   Sig: INJECT 8 UNITS UNDER THE SKIN DAILY   Blood Pressure Monitoring (Sphygmomanometer) MISC  Self No No   Sig: Use 2 (two) times a day   Patient not taking: Reported on 2021   Insulin Pen Needle (Novofine Pen Needle) 32G X 6 MM MISC   No No   Sig: Use 3 (three) times a day with meals   Insulin Pen Needle (UNIFINE PENTIPS PLUS) 31G X 6 MM MISC  Self Yes No   Si Device by Does not apply route 4 (four) times a day   LORazepam (Ativan) 1 mg tablet   No No   Sig: Take 0 5 tablets (0 5 mg total) by mouth 2 (two) times a day as needed for anxiety   Lancets MISC  Self Yes No   Si Device by Does not apply route 4 (four) times a day   Patient not taking: Reported on 2021   acetaminophen (TYLENOL) 160 mg/5 mL suspension   No No   Si 4 mL (975 mg total) by Per G Tube route every 6 (six) hours as needed for mild pain or fever   albuterol (2 5 mg/3 mL) 0 083 % nebulizer solution   No No   Sig: Take 3 mL (2 5 mg total) by nebulization every 4 (four) hours as needed for wheezing or shortness of breath   amiodarone 100 mg tablet   No No   Sig: Take 1 tablet (100 mg total) by mouth daily with breakfast   apixaban (ELIQUIS) 5 mg   No No   Sig: Take 1 tablet (5 mg total) by mouth 2 (two) times a day   atorvastatin (LIPITOR) 40 mg tablet   No No   Sig: Take 1 tablet (40 mg total) by mouth daily   chlorhexidine (PERIDEX) 0 12 % solution   No No   Sig: Apply 15 mL to the mouth or throat every 12 (twelve) hours   escitalopram (Lexapro) 10 mg tablet   No No   Sig: Take 1 tablet (10 mg total) by mouth daily   famotidine (PEPCID) 20 mg/2 5 mL oral suspension   No No   Sig: Take 2 5 mL (20 mg total) by mouth 2 (two) times a day   furosemide (LASIX) 40 mg tablet   No No   Sig: Take 1 tablet (40 mg total) by mouth daily   insulin aspart (NovoLOG FlexPen) 100 UNIT/ML injection pen   No No   Sig: Inject 2 Units under the skin 3 (three) times a day with meals   ipratropium (ATROVENT) 0 02 % nebulizer solution   No No   Sig: Take 2 5 mL (0 5 mg total) by nebulization 3 (three) times a day   levalbuterol (XOPENEX) 1 25 mg/0 5 mL nebulizer solution   No No   Sig: Take 0 5 mL (1 25 mg total) by nebulization 3 (three) times a day   losartan (COZAAR) 50 mg tablet   No No   Sig: Take 1 tablet (50 mg total) by mouth daily   melatonin 3 mg   No No   Sig: Take 2 tablets (6 mg total) by mouth daily at bedtime   metoprolol tartrate (LOPRESSOR) 25 mg tablet   No No   Sig: Take 1 tablet (25 mg total) by mouth every 12 (twelve) hours   polyethylene glycol (MIRALAX) 17 g packet   No No   Sig: Take 17 g by mouth daily   potassium chloride 10% oral solution   No No   Sig: Take 15 mL (20 mEq total) by mouth 2 (two) times a day for 1 dose   predniSONE 10 mg tablet   No No   Sig: Take 4 tabs PO Q day x 5 days   pregabalin (LYRICA) 75 mg capsule  Self No No   Sig: TAKE ONE CAPSULE EVERY DAY   senna-docusate sodium (SENOKOT S) 8 6-50 mg per tablet   No No   Sig: Take 1 tablet by mouth daily at bedtime   spironolactone (ALDACTONE) 100 mg tablet   No No   Sig: Take 1 tablet (100 mg total) by mouth daily   ticagrelor (BRILINTA) 90 MG   No No   Sig: Take 1 tablet (90 mg total) by mouth every 12 (twelve) hours   traZODone (DESYREL) 50 mg tablet   Yes No   Sig: TAKE 0 5 TABLET (25MG) BY MOUTH NIGHTLY AS NEEDED FOR SLEEP  Facility-Administered Medications: None       Past Medical History:   Diagnosis Date    Anxiety     Aortic aneurysm (HCC)     Arthritis     Depression     Diabetes mellitus (HCC)     Fibromyalgia     GERD (gastroesophageal reflux disease)     GERD (gastroesophageal reflux disease)     H/O cardiovascular stress test 09/2018    no ischemia  EF 70%   H/O echocardiogram 01/2019    EF 60%  Mild LVH  trivial effusion   Hyperlipidemia     Hypertension     Migraines     Psychiatric disorder     anxiety    Uncontrolled hypertension 2/25/2015    Last Assessment & Plan:  BP today above goal <140/90, apparently asymptomatic  Prior BP elevations were attributed to not taking medication  Consider increased medication if persistent on f/u  Past Surgical History:   Procedure Laterality Date    BACK SURGERY      Lumbar epidural steroid injection    CARDIAC CATHETERIZATION N/A 10/22/2021    Procedure: Cardiac pci;  Surgeon: Elia Guallpa MD;  [facility]; Service: Cardiology    CARDIAC CATHETERIZATION  10/22/2021    Procedure: Cardiac catheterization;  Surgeon: Elia Guallpa MD;  [facility]; Service: Cardiology    CARDIAC CATHETERIZATION Left 10/27/2021    Procedure: Cardiac Left Heart Cath;  Surgeon: Clifford Gordon MD;  [facility];   Service: Cardiology    CARDIAC CATHETERIZATION N/A 10/27/2021    Procedure: Cardiac pci;  Surgeon: Clifford Gordon MD; [facility]; Service: Cardiology    CARDIAC CATHETERIZATION N/A 10/28/2021    Procedure: Cardiac pci;  Surgeon: Tao Merchant DO;  [facility]; Service: Cardiology    CARPAL TUNNEL RELEASE Left      SECTION      CHOLECYSTECTOMY      COLONOSCOPY      incomplete    COLONOSCOPY      EYE SURGERY      HYSTERECTOMY      Total    OVARIAN CYST REMOVAL      PEG W/TRACHEOSTOMY PLACEMENT N/A 2021    Procedure: TRACHEOSTOMY WITH INSERTION PEG TUBE;  Surgeon: Krzysztof Fung DO;  Location: [facility];  Service: General    TUBAL LIGATION      UPPER GASTROINTESTINAL ENDOSCOPY         Family History   Problem Relation Age of Onset    Hypertension Mother    Maximiliano [place] Arthritis Mother     Diabetes Father     Other Sister         renal cell carcinoma    Diabetes Other     Factor V Leiden deficiency Other      I have reviewed and agree with the history as documented  E-Cigarette/Vaping    E-Cigarette Use Never User      E-Cigarette/Vaping Substances    Nicotine No     THC No     CBD No     Flavoring No     Other No     Unknown No      Social History     Tobacco Use    Smoking status: Former Smoker     Packs/day: 1 00     Years: 30 00     Pack years: 30 00     Types: Cigarettes    Smokeless tobacco: Never Used   Vaping Use    Vaping Use: Never used   Substance Use Topics    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   Drug use: Yes     Types: Marijuana     Comment: medical; seldom use       Review of Systems   Constitutional: Negative for diaphoresis and fever  HENT: Negative for congestion and sore throat  Respiratory: Positive for shortness of breath  Negative for sputum production  Cardiovascular: Negative for chest pain  Gastrointestinal: Negative for abdominal pain and vomiting  Genitourinary: Negative for dysuria, frequency and urgency  Skin: Negative for rash     All other systems reviewed and are negative  Physical Exam  Physical Exam  Vitals and nursing note reviewed  Constitutional:       General: She is not in acute distress  Appearance: She is well-developed  She is not ill-appearing or diaphoretic  HENT:      Head: Normocephalic and atraumatic  Mouth/Throat:      Lips: Pink  Mouth: Mucous membranes are moist       Pharynx: Oropharynx is clear  Uvula midline  No pharyngeal swelling, oropharyngeal exudate, posterior oropharyngeal erythema or uvula swelling  Tonsils: No tonsillar exudate or tonsillar abscesses  Eyes:      Extraocular Movements: Extraocular movements intact  Conjunctiva/sclera: Conjunctivae normal       Pupils: Pupils are equal, round, and reactive to light  Neck:      Trachea: Tracheostomy present  No tracheal tenderness or tracheal deviation  Comments: Larina Rota site, clean, no significant discharge, crepitus, pain out of proportion, or tenderness/erythema or any other concerning findings  Cardiovascular:      Rate and Rhythm: Normal rate and regular rhythm  Pulses: Normal pulses  Heart sounds: Normal heart sounds  No murmur heard  Pulmonary:      Effort: Pulmonary effort is normal  No respiratory distress  Breath sounds: Normal breath sounds  No decreased breath sounds, wheezing, rhonchi or rales  Chest:      Chest wall: No edema  Abdominal:      Palpations: Abdomen is soft  Musculoskeletal:         General: No deformity  Normal range of motion  Cervical back: Normal range of motion  Lymphadenopathy:      Cervical: No cervical adenopathy  Skin:     General: Skin is warm and dry  Capillary Refill: Capillary refill takes less than 2 seconds  Neurological:      Mental Status: She is alert and oriented to person, place, and time  Psychiatric:         Behavior: Behavior normal          Thought Content:  Thought content normal          Judgment: Judgment normal          Vital Signs  ED Triage Vitals   Temperature Pulse Respirations Blood Pressure SpO2   05/14/22 1936 05/14/22 1935 05/14/22 1935 05/14/22 1937 05/14/22 1935   98 6 °F (37 °C) 79 (!) 28 (!) 215/98 99 %      Temp Source Heart Rate Source Patient Position - Orthostatic VS BP Location FiO2 (%)   05/14/22 1936 05/14/22 1935 05/14/22 1935 05/14/22 1935 --   Oral Monitor Sitting Right arm       Pain Score       --                  Vitals:    05/14/22 1935 05/14/22 1937 05/14/22 2000   BP:  (!) 215/98 (!) 178/86   Pulse: 79  66   Patient Position - Orthostatic VS: Sitting  Lying         Visual Acuity      ED Medications  Medications   LORazepam (ATIVAN) injection 0 5 mg (0 5 mg Intravenous Given 5/14/22 1959)   sodium chloride 0 9 % bolus 1,000 mL (1,000 mL Intravenous New Bag 5/14/22 2045)       Diagnostic Studies  Results Reviewed     Procedure Component Value Units Date/Time    HS Troponin I 2hr [[id number]] Collected: 05/14/22 2215    Lab Status:  In process Specimen: Blood from Arm, Right Updated: 05/14/22 2221    HS Troponin 0hr (reflex protocol) [[id number]]  (Normal) Collected: 05/14/22 1951    Lab Status: Final result Specimen: Blood from Arm, Right Updated: 05/14/22 2053     hs TnI 0hr 16 ng/L     HS Troponin I 4hr [[id number]]     Lab Status: No result Specimen: Blood     Comprehensive metabolic panel [[id number]]  (Abnormal) Collected: 05/14/22 1951    Lab Status: Final result Specimen: Blood from Arm, Right Updated: 05/14/22 2028     Sodium 135 mmol/L      Potassium 3 4 mmol/L      Chloride 97 mmol/L      CO2 29 mmol/L      ANION GAP 9 mmol/L      BUN 14 mg/dL      Creatinine 0 86 mg/dL      Glucose 465 mg/dL      Calcium 8 7 mg/dL      AST 8 U/L      ALT 8 U/L      Alkaline Phosphatase 113 U/L      Total Protein 7 3 g/dL      Albumin 3 5 g/dL      Total Bilirubin 0 59 mg/dL      eGFR 76 ml/min/1 73sq m     Narrative:      [place] guidelines for Chronic Kidney Disease (CKD):     Stage 1 with normal or high GFR (GFR > 90 mL/min/1 73 square meters)    Stage 2 Mild CKD (GFR = 60-89 mL/min/1 73 square meters)    Stage 3A Moderate CKD (GFR = 45-59 mL/min/1 73 square meters)    Stage 3B Moderate CKD (GFR = 30-44 mL/min/1 73 square meters)    Stage 4 Severe CKD (GFR = 15-29 mL/min/1 73 square meters)    Stage 5 End Stage CKD (GFR <15 mL/min/1 73 square meters)  Note: GFR calculation is accurate only with a steady state creatinine    Blood gas, venous [[id number]]  (Abnormal) Collected: 05/14/22 1951    Lab Status: Final result Specimen: Blood from Arm, Right Updated: 05/14/22 1958     pH, Daquan 7 448     pCO2, Daquan 41 9 mm Hg      pO2, Daquan 28 3 mm Hg      HCO3, Daquan 28 3 mmol/L      Base Excess, Daquan 3 9 mmol/L      O2 Content, Daquan 8 7 ml/dL      O2 HGB, VENOUS 54 1 %     CBC and differential [[id number]]  (Abnormal) Collected: 05/14/22 1951    Lab Status: Final result Specimen: Blood from Arm, Right Updated: 05/14/22 1957     WBC 9 54 Thousand/uL      RBC 4 37 Million/uL      Hemoglobin 10 6 g/dL      Hematocrit 35 2 %      MCV 81 fL      MCH 24 3 pg      MCHC 30 1 g/dL      RDW 16 7 %      MPV 10 2 fL      Platelets 208 Thousands/uL      nRBC 0 /100 WBCs      Neutrophils Relative 76 %      Immat GRANS % 1 %      Lymphocytes Relative 16 %      Monocytes Relative 6 %      Eosinophils Relative 1 %      Basophils Relative 0 %      Neutrophils Absolute 7 26 Thousands/µL      Immature Grans Absolute 0 05 Thousand/uL      Lymphocytes Absolute 1 48 Thousands/µL      Monocytes Absolute 0 59 Thousand/µL      Eosinophils Absolute 0 13 Thousand/µL      Basophils Absolute 0 03 Thousands/µL                  XR chest 1 view portable   ED Interpretation by Jaya Herbert MD (05/14 2124)   NAD                 Procedures  Procedures         ED Course  ED Course as of 05/14/22 2230   Sat May 14, 2022   1945 Called to bedside by nursing, pt with tachypnea and WOB, however O2 ok and moving good air throughout, pt reports [id number] anxiety  HR 70's on monitor and EKG  Suspect there is some anxiety at play  Will check labs, vbg, cxr and monitor closely  2005 Blood gas, venous(!)  Reassuring, no acidosis, no significant retention of CO2   2005 CBC and differential(!)  No significant leukocytosis reassuring diff, normal H/H, normal platelets  2020 Pt much improved  HR and O2 remain wnl  Labs so far reassuring  2030 Comprehensive metabolic panel(!)  Veyr minimally low potassium, markedly elevated glucose, no AG, normal bicarb, not c/w DKA, non-specific elevation of alk phos noted, no other remarkable findgins  2102 hs TnI 0hr: 12  noted                                             MDM  Number of Diagnoses or Management Options  Hyperglycemia due to diabetes mellitus ([facility] [address] )  Sore throat  Diagnosis management comments: Work up reassuring  Improved symptoms with ativan  No signs of PNA, ptx, effusion or change from baselineo n CXR>  EKG without change  Hyperglycemia noted without AG to suggest DKA, pt did not take evening meds  Throat and trach site benign  Pt safe for dispo to home at pt at baseline O2  Likely strong anxiety component  At this point no indication for antibiotics  Amount and/or Complexity of Data Reviewed  Clinical lab tests: ordered and reviewed  Tests in the radiology section of CPT®: ordered and reviewed  Tests in the medicine section of CPT®: ordered and reviewed  Review and summarize past medical records: yes  Independent visualization of images, tracings, or specimens: yes    Risk of Complications, Morbidity, and/or Mortality  Presenting problems: moderate  Diagnostic procedures: moderate  Management options: moderate    Patient Progress  Patient progress: improved      Disposition  Final diagnoses:   None     ED Disposition     None      Follow-up Information    None         Patient's Medications   Discharge Prescriptions    No medications on file       No discharge procedures on file      PDMP Review       Value Time User    PDMP Reviewed  Yes 4/7/2022 10:21 AM Ro Munoz MD          ED Provider  Electronically Signed by           Vanesa Hunt MD  05/18/22 7413

## 2022-05-16 LAB
BACTERIA BLD CULT: NORMAL
BACTERIA BLD CULT: NORMAL

## 2022-05-16 RX ORDER — ESCITALOPRAM OXALATE 10 MG/1
TABLET ORAL
Qty: 60 TABLET | Refills: 0 | Status: ON HOLD | OUTPATIENT
Start: 2022-05-16 | End: 2022-07-15

## 2022-05-17 ENCOUNTER — TELEPHONE (OUTPATIENT)
Dept: FAMILY MEDICINE CLINIC | Facility: CLINIC | Age: 57
End: 2022-05-17

## 2022-05-17 NOTE — TELEPHONE ENCOUNTER
Patient daughter is calling and she was stating her mom Michelle Galvan is complaining of difficulty breathing  They do not have a pulse ox to check oxygenation  They states she has been coughing intermittently  They had her to the ER on 5/14 but since the patient is still complaining of difficulty breathing and shortness of breath I have suggested she go to ER  We are unable to check O2 at home and with suctioning they are not able to relieve the problem  Please review if you feel this is better handled in a different way  Of note the home care agency did discharge her in April  WE may be able to request a new homecare referral for ongoing trach care  Please review   Thank you

## 2022-05-19 ENCOUNTER — APPOINTMENT (EMERGENCY)
Dept: CT IMAGING | Facility: HOSPITAL | Age: 57
DRG: 177 | End: 2022-05-19
Payer: COMMERCIAL

## 2022-05-19 ENCOUNTER — HOSPITAL ENCOUNTER (INPATIENT)
Facility: HOSPITAL | Age: 57
LOS: 3 days | Discharge: HOME HEALTH | DRG: 177 | End: 2022-05-23
Attending: EMERGENCY MEDICINE | Admitting: INTERNAL MEDICINE
Payer: COMMERCIAL

## 2022-05-19 DIAGNOSIS — J81.1 PULMONARY EDEMA: ICD-10-CM

## 2022-05-19 DIAGNOSIS — I50.9 CHF EXACERBATION (HCC): ICD-10-CM

## 2022-05-19 DIAGNOSIS — E11.9 TYPE 2 DIABETES MELLITUS TREATED WITH INSULIN (HCC): ICD-10-CM

## 2022-05-19 DIAGNOSIS — E78.5 HYPERLIPIDEMIA ASSOCIATED WITH TYPE 2 DIABETES MELLITUS (HCC): Chronic | ICD-10-CM

## 2022-05-19 DIAGNOSIS — U07.1 COVID: Primary | ICD-10-CM

## 2022-05-19 DIAGNOSIS — R06.00 DYSPNEA: ICD-10-CM

## 2022-05-19 DIAGNOSIS — R06.02 SOB (SHORTNESS OF BREATH): ICD-10-CM

## 2022-05-19 DIAGNOSIS — J02.9 SORE THROAT: ICD-10-CM

## 2022-05-19 DIAGNOSIS — R79.89 ELEVATED BRAIN NATRIURETIC PEPTIDE (BNP) LEVEL: ICD-10-CM

## 2022-05-19 DIAGNOSIS — Z79.4 TYPE 2 DIABETES MELLITUS TREATED WITH INSULIN (HCC): ICD-10-CM

## 2022-05-19 DIAGNOSIS — E11.69 HYPERLIPIDEMIA ASSOCIATED WITH TYPE 2 DIABETES MELLITUS (HCC): Chronic | ICD-10-CM

## 2022-05-19 DIAGNOSIS — I50.43 ACUTE ON CHRONIC COMBINED SYSTOLIC AND DIASTOLIC HEART FAILURE (HCC): ICD-10-CM

## 2022-05-19 DIAGNOSIS — Z43.0 TRACHEOSTOMY CARE (HCC): ICD-10-CM

## 2022-05-19 DIAGNOSIS — R10.9 ABDOMINAL PAIN: ICD-10-CM

## 2022-05-19 LAB
ALBUMIN SERPL BCP-MCNC: 3.3 G/DL (ref 3.5–5)
ALP SERPL-CCNC: 94 U/L (ref 34–104)
ALT SERPL W P-5'-P-CCNC: 7 U/L (ref 7–52)
ANION GAP SERPL CALCULATED.3IONS-SCNC: 8 MMOL/L (ref 4–13)
AST SERPL W P-5'-P-CCNC: 7 U/L (ref 13–39)
BASOPHILS # BLD AUTO: 0.02 THOUSANDS/ΜL (ref 0–0.1)
BASOPHILS NFR BLD AUTO: 0 % (ref 0–1)
BILIRUB DIRECT SERPL-MCNC: 0.18 MG/DL (ref 0–0.2)
BILIRUB SERPL-MCNC: 0.53 MG/DL (ref 0.2–1)
BNP SERPL-MCNC: 454 PG/ML (ref 0–100)
BUN SERPL-MCNC: 15 MG/DL (ref 5–25)
CALCIUM SERPL-MCNC: 8.3 MG/DL (ref 8.4–10.2)
CARDIAC TROPONIN I PNL SERPL HS: 20 NG/L
CHLORIDE SERPL-SCNC: 100 MMOL/L (ref 96–108)
CO2 SERPL-SCNC: 26 MMOL/L (ref 21–32)
CREAT SERPL-MCNC: 0.84 MG/DL (ref 0.6–1.3)
EOSINOPHIL # BLD AUTO: 0.09 THOUSAND/ΜL (ref 0–0.61)
EOSINOPHIL NFR BLD AUTO: 1 % (ref 0–6)
ERYTHROCYTE [DISTWIDTH] IN BLOOD BY AUTOMATED COUNT: 16.7 % (ref 11.6–15.1)
FLUAV RNA RESP QL NAA+PROBE: NEGATIVE
FLUBV RNA RESP QL NAA+PROBE: NEGATIVE
GFR SERPL CREATININE-BSD FRML MDRD: 78 ML/MIN/1.73SQ M
GLUCOSE SERPL-MCNC: 474 MG/DL (ref 65–140)
HCT VFR BLD AUTO: 33.1 % (ref 34.8–46.1)
HGB BLD-MCNC: 10.1 G/DL (ref 11.5–15.4)
IMM GRANULOCYTES # BLD AUTO: 0.06 THOUSAND/UL (ref 0–0.2)
IMM GRANULOCYTES NFR BLD AUTO: 1 % (ref 0–2)
LIPASE SERPL-CCNC: 9 U/L (ref 11–82)
LYMPHOCYTES # BLD AUTO: 1.32 THOUSANDS/ΜL (ref 0.6–4.47)
LYMPHOCYTES NFR BLD AUTO: 16 % (ref 14–44)
MCH RBC QN AUTO: 24.3 PG (ref 26.8–34.3)
MCHC RBC AUTO-ENTMCNC: 30.5 G/DL (ref 31.4–37.4)
MCV RBC AUTO: 80 FL (ref 82–98)
MONOCYTES # BLD AUTO: 0.62 THOUSAND/ΜL (ref 0.17–1.22)
MONOCYTES NFR BLD AUTO: 7 % (ref 4–12)
NEUTROPHILS # BLD AUTO: 6.3 THOUSANDS/ΜL (ref 1.85–7.62)
NEUTS SEG NFR BLD AUTO: 75 % (ref 43–75)
NRBC BLD AUTO-RTO: 0 /100 WBCS
PLATELET # BLD AUTO: 409 THOUSANDS/UL (ref 149–390)
PMV BLD AUTO: 10.6 FL (ref 8.9–12.7)
POTASSIUM SERPL-SCNC: 3.2 MMOL/L (ref 3.5–5.3)
PROT SERPL-MCNC: 6.8 G/DL (ref 6.4–8.4)
RBC # BLD AUTO: 4.16 MILLION/UL (ref 3.81–5.12)
RSV RNA RESP QL NAA+PROBE: NEGATIVE
SARS-COV-2 RNA RESP QL NAA+PROBE: POSITIVE
SODIUM SERPL-SCNC: 134 MMOL/L (ref 135–147)
WBC # BLD AUTO: 8.41 THOUSAND/UL (ref 4.31–10.16)

## 2022-05-19 PROCEDURE — 83735 ASSAY OF MAGNESIUM: CPT

## 2022-05-19 PROCEDURE — 99285 EMERGENCY DEPT VISIT HI MDM: CPT

## 2022-05-19 PROCEDURE — 94760 N-INVAS EAR/PLS OXIMETRY 1: CPT

## 2022-05-19 PROCEDURE — 80076 HEPATIC FUNCTION PANEL: CPT | Performed by: EMERGENCY MEDICINE

## 2022-05-19 PROCEDURE — 83690 ASSAY OF LIPASE: CPT | Performed by: EMERGENCY MEDICINE

## 2022-05-19 PROCEDURE — 99219 PR INITIAL OBSERVATION CARE/DAY 50 MINUTES: CPT

## 2022-05-19 PROCEDURE — 36415 COLL VENOUS BLD VENIPUNCTURE: CPT | Performed by: EMERGENCY MEDICINE

## 2022-05-19 PROCEDURE — 82550 ASSAY OF CK (CPK): CPT

## 2022-05-19 PROCEDURE — 0241U HB NFCT DS VIR RESP RNA 4 TRGT: CPT | Performed by: EMERGENCY MEDICINE

## 2022-05-19 PROCEDURE — 80048 BASIC METABOLIC PNL TOTAL CA: CPT | Performed by: EMERGENCY MEDICINE

## 2022-05-19 PROCEDURE — 96374 THER/PROPH/DIAG INJ IV PUSH: CPT

## 2022-05-19 PROCEDURE — 85025 COMPLETE CBC W/AUTO DIFF WBC: CPT | Performed by: EMERGENCY MEDICINE

## 2022-05-19 PROCEDURE — 86140 C-REACTIVE PROTEIN: CPT

## 2022-05-19 PROCEDURE — 84484 ASSAY OF TROPONIN QUANT: CPT | Performed by: EMERGENCY MEDICINE

## 2022-05-19 PROCEDURE — 83880 ASSAY OF NATRIURETIC PEPTIDE: CPT | Performed by: EMERGENCY MEDICINE

## 2022-05-19 PROCEDURE — 84145 PROCALCITONIN (PCT): CPT

## 2022-05-19 PROCEDURE — 74176 CT ABD & PELVIS W/O CONTRAST: CPT

## 2022-05-19 PROCEDURE — 93005 ELECTROCARDIOGRAM TRACING: CPT

## 2022-05-19 PROCEDURE — 99285 EMERGENCY DEPT VISIT HI MDM: CPT | Performed by: EMERGENCY MEDICINE

## 2022-05-19 PROCEDURE — 71250 CT THORAX DX C-: CPT

## 2022-05-19 RX ORDER — LIDOCAINE HYDROCHLORIDE 20 MG/ML
10 SOLUTION OROPHARYNGEAL ONCE
Status: COMPLETED | OUTPATIENT
Start: 2022-05-19 | End: 2022-05-19

## 2022-05-19 RX ORDER — INSULIN GLARGINE 100 [IU]/ML
8 INJECTION, SOLUTION SUBCUTANEOUS
Status: DISCONTINUED | OUTPATIENT
Start: 2022-05-19 | End: 2022-05-21

## 2022-05-19 RX ORDER — INSULIN LISPRO 100 [IU]/ML
1-6 INJECTION, SOLUTION INTRAVENOUS; SUBCUTANEOUS
Status: DISCONTINUED | OUTPATIENT
Start: 2022-05-19 | End: 2022-05-23 | Stop reason: HOSPADM

## 2022-05-19 RX ORDER — INSULIN LISPRO 100 [IU]/ML
3 INJECTION, SOLUTION INTRAVENOUS; SUBCUTANEOUS
Status: DISCONTINUED | OUTPATIENT
Start: 2022-05-20 | End: 2022-05-21

## 2022-05-19 RX ORDER — KETOROLAC TROMETHAMINE 30 MG/ML
15 INJECTION, SOLUTION INTRAMUSCULAR; INTRAVENOUS ONCE
Status: COMPLETED | OUTPATIENT
Start: 2022-05-19 | End: 2022-05-19

## 2022-05-19 RX ORDER — INSULIN LISPRO 100 [IU]/ML
1-6 INJECTION, SOLUTION INTRAVENOUS; SUBCUTANEOUS
Status: DISCONTINUED | OUTPATIENT
Start: 2022-05-20 | End: 2022-05-23 | Stop reason: HOSPADM

## 2022-05-19 RX ORDER — POTASSIUM CHLORIDE 20MEQ/15ML
20 LIQUID (ML) ORAL ONCE
Status: COMPLETED | OUTPATIENT
Start: 2022-05-19 | End: 2022-05-19

## 2022-05-19 RX ADMIN — KETOROLAC TROMETHAMINE 15 MG: 30 INJECTION, SOLUTION INTRAMUSCULAR at 20:39

## 2022-05-19 RX ADMIN — POTASSIUM CHLORIDE 20 MEQ: 20 SOLUTION ORAL at 20:42

## 2022-05-19 RX ADMIN — LIDOCAINE HYDROCHLORIDE 10 ML: 20 SOLUTION ORAL; TOPICAL at 20:40

## 2022-05-19 NOTE — ED PROVIDER NOTES
History  Chief Complaint   Patient presents with    Shortness of Breath     Patient here via EMS with c/o SOB, has trach and feels like something is blocked while at home  Patient has life vest       80-year-old female history of cardiac arrest, tracheostomy placement, aortic aneurysm hypertension, hyperlipidemia, diabetes presenting with dyspnea, coughing green sputum  Also complains of abdominal pain in left upper quadrant  No nausea, vomiting, diarrhea, fevers, chills  Shortness of Breath  Severity:  Moderate  Onset quality:  Sudden  Timing:  Constant  Progression:  Unchanged  Chronicity:  New  Relieved by:  Nothing  Worsened by:  Nothing  Ineffective treatments:  None tried  Associated symptoms: abdominal pain, cough and sore throat        Prior to Admission Medications   Prescriptions Last Dose Informant Patient Reported? Taking?    Basaglar KwikPen 100 units/mL injection pen   No No   Sig: INJECT 8 UNITS UNDER THE SKIN DAILY   Benzocaine-Menthol 15-2 6 MG LOZG   No No   Sig: Apply 1 lozenge to the mouth or throat 4 (four) times a day as needed (sore throat)   Blood Pressure Monitoring (Sphygmomanometer) MISC  Self No No   Sig: Use 2 (two) times a day   Patient not taking: Reported on 2021   Insulin Pen Needle (Novofine Pen Needle) 32G X 6 MM MISC   No No   Sig: Use 3 (three) times a day with meals   Insulin Pen Needle (UNIFINE PENTIPS PLUS) 31G X 6 MM MISC  Self Yes No   Si Device by Does not apply route 4 (four) times a day   LORazepam (Ativan) 1 mg tablet   No No   Sig: Take 0 5 tablets (0 5 mg total) by mouth 2 (two) times a day as needed for anxiety   Lancets MISC  Self Yes No   Si Device by Does not apply route 4 (four) times a day   Patient not taking: Reported on 2021   acetaminophen (TYLENOL) 160 mg/5 mL suspension   No No   Si 4 mL (975 mg total) by Per G Tube route every 6 (six) hours as needed for mild pain or fever   albuterol (2 5 mg/3 mL) 0 083 % nebulizer solution   No No   Sig: Take 3 mL (2 5 mg total) by nebulization every 4 (four) hours as needed for wheezing or shortness of breath   amiodarone 100 mg tablet   No No   Sig: Take 1 tablet (100 mg total) by mouth daily with breakfast   apixaban (ELIQUIS) 5 mg   No No   Sig: Take 1 tablet (5 mg total) by mouth 2 (two) times a day   atorvastatin (LIPITOR) 40 mg tablet   No No   Sig: Take 1 tablet (40 mg total) by mouth daily   chlorhexidine (PERIDEX) 0 12 % solution   No No   Sig: Apply 15 mL to the mouth or throat every 12 (twelve) hours   escitalopram (LEXAPRO) 10 mg tablet   No No   Sig: TAKE 1 TABLET BY MOUTH EVERY DAY   famotidine (PEPCID) 20 mg/2 5 mL oral suspension   No No   Sig: Take 2 5 mL (20 mg total) by mouth 2 (two) times a day   furosemide (LASIX) 40 mg tablet   No No   Sig: Take 1 tablet (40 mg total) by mouth daily   insulin aspart (NovoLOG FlexPen) 100 UNIT/ML injection pen   No No   Sig: Inject 2 Units under the skin 3 (three) times a day with meals   ipratropium (ATROVENT) 0 02 % nebulizer solution   No No   Sig: Take 2 5 mL (0 5 mg total) by nebulization 3 (three) times a day   levalbuterol (XOPENEX) 1 25 mg/0 5 mL nebulizer solution   No No   Sig: Take 0 5 mL (1 25 mg total) by nebulization 3 (three) times a day   losartan (COZAAR) 50 mg tablet   No No   Sig: Take 1 tablet (50 mg total) by mouth daily   melatonin 3 mg   No No   Sig: Take 2 tablets (6 mg total) by mouth daily at bedtime   metoprolol tartrate (LOPRESSOR) 25 mg tablet   No No   Sig: Take 1 tablet (25 mg total) by mouth every 12 (twelve) hours   polyethylene glycol (MIRALAX) 17 g packet   No No   Sig: Take 17 g by mouth daily   potassium chloride 10% oral solution   No No   Sig: Take 15 mL (20 mEq total) by mouth 2 (two) times a day for 1 dose   predniSONE 10 mg tablet   No No   Sig: Take 4 tabs PO Q day x 5 days   pregabalin (LYRICA) 75 mg capsule  Self No No   Sig: TAKE ONE CAPSULE EVERY DAY   senna-docusate sodium (SENOKOT S) 8 6-50 mg per tablet   No No   Sig: Take 1 tablet by mouth daily at bedtime   spironolactone (ALDACTONE) 100 mg tablet   No No   Sig: Take 1 tablet (100 mg total) by mouth daily   ticagrelor (BRILINTA) 90 MG   No No   Sig: Take 1 tablet (90 mg total) by mouth every 12 (twelve) hours   traZODone (DESYREL) 50 mg tablet   Yes No   Sig: TAKE 0 5 TABLET (25MG) BY MOUTH NIGHTLY AS NEEDED FOR SLEEP  Facility-Administered Medications: None       Past Medical History:   Diagnosis Date    Anxiety     Aortic aneurysm (HCC)     Arthritis     Depression     Diabetes mellitus (HCC)     Fibromyalgia     GERD (gastroesophageal reflux disease)     GERD (gastroesophageal reflux disease)     H/O cardiovascular stress test 09/2018    no ischemia  EF 70%   H/O echocardiogram 01/2019    EF 60%  Mild LVH  trivial effusion   Hyperlipidemia     Hypertension     Migraines     Psychiatric disorder     anxiety    Uncontrolled hypertension 2/25/2015    Last Assessment & Plan:  BP today above goal <140/90, apparently asymptomatic  Prior BP elevations were attributed to not taking medication  Consider increased medication if persistent on f/u  Past Surgical History:   Procedure Laterality Date    BACK SURGERY      Lumbar epidural steroid injection    CARDIAC CATHETERIZATION N/A 10/22/2021    Procedure: Cardiac pci;  Surgeon: Chandrakant Stringer MD;  [facility]; Service: Cardiology    CARDIAC CATHETERIZATION  10/22/2021    Procedure: Cardiac catheterization;  Surgeon: Chandrakant Stringer MD;  [facility]; Service: Cardiology    CARDIAC CATHETERIZATION Left 10/27/2021    Procedure: Cardiac Left Heart Cath;  Surgeon: Benji Chow MD;  [facility]; Service: Cardiology    CARDIAC CATHETERIZATION N/A 10/27/2021    Procedure: Cardiac pci;  Surgeon: Benji Chow MD;  [facility];   Service: Cardiology    CARDIAC CATHETERIZATION N/A 10/28/2021 Procedure: Cardiac pci;  Surgeon: Donna Jeong DO;  [facility]; Service: Cardiology    CARPAL TUNNEL RELEASE Left      SECTION      CHOLECYSTECTOMY      COLONOSCOPY      incomplete    COLONOSCOPY      EYE SURGERY      HYSTERECTOMY      Total    OVARIAN CYST REMOVAL      PEG W/TRACHEOSTOMY PLACEMENT N/A 2021    Procedure: TRACHEOSTOMY WITH INSERTION PEG TUBE;  Surgeon: Daniel Fung DO;  Location[facility];  Service: General    TUBAL LIGATION      UPPER GASTROINTESTINAL ENDOSCOPY         Family History   Problem Relation Age of Onset    Hypertension Mother    Courtney Credit Arthritis Mother     Diabetes Father     Other Sister         renal cell carcinoma    Diabetes Other     Factor V Leiden deficiency Other      I have reviewed and agree with the history as documented  E-Cigarette/Vaping    E-Cigarette Use Never User      E-Cigarette/Vaping Substances    Nicotine No     THC No     CBD No     Flavoring No     Other No     Unknown No      Social History     Tobacco Use    Smoking status: Former Smoker     Packs/day: 1 00     Years: 30 00     Pack years: 30 00     Types: Cigarettes    Smokeless tobacco: Never Used   Vaping Use    Vaping Use: Never used   Substance Use Topics    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   Drug use: Yes     Types: Marijuana     Comment: medical; seldom use       Review of Systems   Constitutional: Positive for fatigue  HENT: Positive for sore throat  Respiratory: Positive for cough and shortness of breath  Gastrointestinal: Positive for abdominal pain and constipation  All other systems reviewed and are negative  Physical Exam  Physical Exam  Vitals and nursing note reviewed  Constitutional:       General: She is not in acute distress  Appearance: Normal appearance  She is not ill-appearing  HENT:      Head: Normocephalic and atraumatic        Right Ear: External ear normal  Left Ear: External ear normal       Nose: Nose normal       Mouth/Throat:      Mouth: Mucous membranes are moist       Pharynx: No pharyngeal swelling or oropharyngeal exudate  Eyes:      General:         Right eye: No discharge  Left eye: No discharge  Conjunctiva/sclera: Conjunctivae normal    Neck:      Comments: Trach w green thick sputum  Cardiovascular:      Rate and Rhythm: Normal rate and regular rhythm  Pulses: Normal pulses  Heart sounds: No murmur heard  Pulmonary:      Effort: Pulmonary effort is normal       Breath sounds: Normal breath sounds  Abdominal:      General: Abdomen is flat  There is no distension  Tenderness: There is no abdominal tenderness  There is no guarding  Comments: LUQ pain on palpation     Musculoskeletal:         General: Normal range of motion  Cervical back: Normal range of motion  Right lower leg: No edema  Left lower leg: No edema  Skin:     General: Skin is warm  Capillary Refill: Capillary refill takes less than 2 seconds  Findings: No rash  Neurological:      General: No focal deficit present  Mental Status: She is alert  Mental status is at baseline     Psychiatric:         Mood and Affect: Mood normal          Behavior: Behavior normal          Vital Signs  ED Triage Vitals   Temperature Pulse Respirations Blood Pressure SpO2   05/19/22 1807 05/19/22 1807 05/19/22 1807 05/19/22 1807 05/19/22 1807   98 8 °F (37 1 °C) 79 18 115/94 100 %      Temp Source Heart Rate Source Patient Position - Orthostatic VS BP Location FiO2 (%)   05/19/22 1807 05/19/22 1807 05/19/22 1807 05/19/22 1807 05/19/22 1834   Oral Monitor Sitting Left arm 40      Pain Score       --                  Vitals:    05/19/22 1807   BP: 115/94   Pulse: 79   Patient Position - Orthostatic VS: Sitting         Visual Acuity      ED Medications  Medications   potassium chloride oral solution 20 mEq (20 mEq Oral Given 5/19/22 2042)   ketorolac (TORADOL) injection 15 mg (15 mg Intravenous Given 5/19/22 2039)   Lidocaine Viscous HCl (XYLOCAINE) 2 % mucosal solution 10 mL (10 mL Swish & Swallow Given 5/19/22 2040)       Diagnostic Studies  Results Reviewed     Procedure Component Value Units Date/Time    HS Troponin 0hr (reflex protocol) [[id number]]  (Normal) Collected: 05/19/22 2136    Lab Status: Final result Specimen: Blood from Arm, Right Updated: 05/19/22 2205     hs TnI 0hr 20 ng/L     HS Troponin I 2hr [[id number]]     Lab Status: No result Specimen: Blood     B-Type Natriuretic Peptide(BNP), AN, CA, EA Campuses Only [[id number]]  (Abnormal) Collected: 05/19/22 2136    Lab Status: Final result Specimen: Blood from Arm, Right Updated: 05/19/22 2204      pg/mL     COVID/FLU/RSV [[id number]]  (Abnormal) Collected: 05/19/22 1850    Lab Status: Final result Specimen: Nares from Nasopharyngeal Swab Updated: 05/19/22 1935     SARS-CoV-2 Positive     INFLUENZA A PCR Negative     INFLUENZA B PCR Negative     RSV PCR Negative    Narrative:      FOR PEDIATRIC PATIENTS - copy/paste COVID Guidelines URL to browser: [email or web address]  [employer]    SARS-CoV-2 assay is a Nucleic Acid Amplification assay intended for the  qualitative detection of nucleic acid from SARS-CoV-2 in nasopharyngeal  swabs  Results are for the presumptive identification of SARS-CoV-2 RNA  Positive results are indicative of infection with SARS-CoV-2, the virus  causing COVID-19, but do not rule out bacterial infection or co-infection  with other viruses  Laboratories within the [place] and its  territories are required to report all positive results to the appropriate  public health authorities  Negative results do not preclude SARS-CoV-2  infection and should not be used as the sole basis for treatment or other  patient management decisions   Negative results must be combined with  clinical observations, patient history, and epidemiological information  This test has not been FDA cleared or approved  This test has been authorized by FDA under an Emergency Use Authorization  (EUA)  This test is only authorized for the duration of time the  declaration that circumstances exist justifying the authorization of the  emergency use of an in vitro diagnostic tests for detection of SARS-CoV-2  virus and/or diagnosis of COVID-19 infection under section 564(b)(1) of  the Act, 21 U  S C  138GML-0(V)(1), unless the authorization is terminated  or revoked sooner  The test has been validated but independent review by FDA  and CLIA is pending  Test performed using [employer] Gene[employer]pert: This RT-PCR assay targets N2,  a region unique to SARS-CoV-2  A conserved region in the E-gene was chosen  for pan-Sarbecovirus detection which includes SARS-CoV-2      Basic metabolic panel [[id number]]  (Abnormal) Collected: 05/19/22 1848    Lab Status: Final result Specimen: Blood from Arm, Right Updated: 05/19/22 1912     Sodium 134 mmol/L      Potassium 3 2 mmol/L      Chloride 100 mmol/L      CO2 26 mmol/L      ANION GAP 8 mmol/L      BUN 15 mg/dL      Creatinine 0 84 mg/dL      Glucose 474 mg/dL      Calcium 8 3 mg/dL      eGFR 78 ml/min/1 73sq m     Narrative:      [place] guidelines for Chronic Kidney Disease (CKD):     Stage 1 with normal or high GFR (GFR > 90 mL/min/1 73 square meters)    Stage 2 Mild CKD (GFR = 60-89 mL/min/1 73 square meters)    Stage 3A Moderate CKD (GFR = 45-59 mL/min/1 73 square meters)    Stage 3B Moderate CKD (GFR = 30-44 mL/min/1 73 square meters)    Stage 4 Severe CKD (GFR = 15-29 mL/min/1 73 square meters)    Stage 5 End Stage CKD (GFR <15 mL/min/1 73 square meters)  Note: GFR calculation is accurate only with a steady state creatinine    Hepatic function panel [[id number]]  (Abnormal) Collected: 05/19/22 1848    Lab Status: Final result Specimen: Blood from Arm, Right Updated: 05/19/22 1912     Total Bilirubin 0 53 mg/dL      Bilirubin, Direct 0 18 mg/dL      Alkaline Phosphatase 94 U/L      AST 7 U/L      ALT 7 U/L      Total Protein 6 8 g/dL      Albumin 3 3 g/dL     Lipase [[id number]]  (Abnormal) Collected: 05/19/22 1848    Lab Status: Final result Specimen: Blood from Arm, Right Updated: 05/19/22 1912     Lipase 9 u/L     CBC and differential [[id number]]  (Abnormal) Collected: 05/19/22 1848    Lab Status: Final result Specimen: Blood from Arm, Right Updated: 05/19/22 1857     WBC 8 41 Thousand/uL      RBC 4 16 Million/uL      Hemoglobin 10 1 g/dL      Hematocrit 33 1 %      MCV 80 fL      MCH 24 3 pg      MCHC 30 5 g/dL      RDW 16 7 %      MPV 10 6 fL      Platelets 738 Thousands/uL      nRBC 0 /100 WBCs      Neutrophils Relative 75 %      Immat GRANS % 1 %      Lymphocytes Relative 16 %      Monocytes Relative 7 %      Eosinophils Relative 1 %      Basophils Relative 0 %      Neutrophils Absolute 6 30 Thousands/µL      Immature Grans Absolute 0 06 Thousand/uL      Lymphocytes Absolute 1 32 Thousands/µL      Monocytes Absolute 0 62 Thousand/µL      Eosinophils Absolute 0 09 Thousand/µL      Basophils Absolute 0 02 Thousands/µL                  CT chest abdomen pelvis wo contrast   Final Result by Ann Marie Mendes MD (05/19 2035)      Pulmonary edema likely secondary to CHF  Severe cardiomegaly  Unchanged 42 mm ascending aortic aneurysm      No acute findings in the abdomen or pelvis  The study was marked in [facility] for immediate notification  Workstation performed: [id number]                    Procedures  Procedures         ED Course  ED Course as of 05/19/22 2255   Thu May 19, 2022   1900 Procedure Note: EKG  Date/Time: 05/19/22 19:00   Interpreted by: Tete Kline  Indications / Diagnosis: Dyspnea  ECG reviewed by me, the ED Provider: yes   The EKG demonstrates:  Rhythm: normal sinus  Intervals: Wide QRS,   Axis: normal axis  QRS/Blocks: Wide QRS  ST Changes:  No acute ST Changes, no STD/FELTON  Right bundle-branch block and left anterior fascicular block     1948 SARS-COV-2(!): Positive                               SBIRT 20yo+    Flowsheet Row Most Recent Value   SBIRT (25 yo +)    In order to provide better care to our patients, we are screening all of our patients for alcohol and drug use  Would it be okay to ask you these screening questions? Yes Filed at: 05/19/2022 2142   Initial Alcohol Screen: US AUDIT-C     1  How often do you have a drink containing alcohol? 0 Filed at: 05/19/2022 2142   2  How many drinks containing alcohol do you have on a typical day you are drinking? 0 Filed at: 05/19/2022 2142   3b  FEMALE Any Age, or MALE 65+: How often do you have 4 or more drinks on one occassion? 0 Filed at: 05/19/2022 2142   Audit-C Score 0 Filed at: 05/19/2022 2142   PAVEL: How many times in the past year have you    Used an illegal drug or used a prescription medication for non-medical reasons? Never Filed at: 05/19/2022 2142                    MDM  Number of Diagnoses or Management Options  Abdominal pain: new and requires workup  COVID: new and requires workup  Dyspnea: new and requires workup  Elevated brain natriuretic peptide (BNP) level: new and requires workup  Pulmonary edema: new and requires workup  Sore throat: new and requires workup  Tracheostomy care [facility]): new and requires workup  Diagnosis management comments: Patient with trach plugging, dyspnea  Satting well  Will check for pneumonia, covid, flu  No signs of volume overload  Has LUQ abd pain  Will assess for acute intra-abdominal process  Is covid positive  Has pumonary edema  Will add heart failure workup  With trach plugs, covid and probable heart failure exacerbation likely will need observation  Referral sent to monoclonal antibody clinic      Signed out to Dr Shekhar Cotton pending results of cardiac enzymens       Amount and/or Complexity of Data Reviewed  Clinical lab tests: ordered and reviewed  Tests in the radiology section of CPT®: ordered and reviewed  Decide to obtain previous medical records or to obtain history from someone other than the patient: yes  Review and summarize past medical records: yes  Discuss the patient with other providers: yes  Independent visualization of images, tracings, or specimens: yes        Disposition  Final diagnoses:   COVID   Dyspnea   Sore throat   Abdominal pain   Tracheostomy care ([facility] [address] )   Pulmonary edema   Elevated brain natriuretic peptide (BNP) level     Time reflects when diagnosis was documented in both MDM as applicable and the Disposition within this note     Time User Action Codes Description Comment    5/19/2022  7:51 PM Miya Pickup Add [U07 1] COVID     5/19/2022  7:51 PM Miya Pickup Add [R06 00] Dyspnea     5/19/2022  8:23 PM Miya Pickup Add [J02 9] Sore throat     5/19/2022  8:23 PM Miya Pickup Add [R10 9] Abdominal pain     5/19/2022  9:22 PM Miya Pickup Add [Z43 0] Tracheostomy care ([facility] [address] )     5/19/2022  9:23 PM Miya Pickup Add [J81 1] Pulmonary edema     5/19/2022 10:31 PM Marzetta Ludivina Add [R79 89] Elevated brain natriuretic peptide (BNP) level       ED Disposition     ED Disposition   Admit    Condition   Stable    Date/Time   Thu May 19, 2022 10:31 PM    Comment   Case was reviewed with inpatient team, and the patient's admission status was agreed to be Admission Status: observation status to the service of Dr Georges Lozano             Follow-up Information     Follow up With Specialties Details Why Contact Info Additional Information    Rakesh Morales MD Family Medicine  For re-evaluation as soon as possible [address] [place] [address] [place]  [phone]       [facility] [address] [facility] Schedule an appointment as soon as possible for a visit  For re-evaluation as soon as possible regarding tracheostomy [address]  [place] [address]  [address] [facility] J Luis PRABHAKAR   [address] [place], [place], [address], [phone]          Patient's Medications   Discharge Prescriptions    No medications on file       No discharge procedures on file      PDMP Review       Value Time User    PDMP Reviewed  Yes 4/7/2022 10:21 AM Anne Brooks MD          ED Provider  Electronically Signed by           Gerson Elizondo, DO  05/19/22 [address] [facility] [place], [place]  05/19/22 5942

## 2022-05-20 PROBLEM — R94.31 PROLONGED Q-T INTERVAL ON ECG: Status: ACTIVE | Noted: 2022-05-20

## 2022-05-20 PROBLEM — R06.02 SHORTNESS OF BREATH: Status: ACTIVE | Noted: 2022-05-20

## 2022-05-20 LAB
2HR DELTA HS TROPONIN: 4 NG/L
4HR DELTA HS TROPONIN: 3 NG/L
ATRIAL RATE: 69 BPM
BASOPHILS # BLD AUTO: 0.03 THOUSANDS/ΜL (ref 0–0.1)
BASOPHILS NFR BLD AUTO: 0 % (ref 0–1)
CARDIAC TROPONIN I PNL SERPL HS: 23 NG/L
CARDIAC TROPONIN I PNL SERPL HS: 24 NG/L
CK SERPL-CCNC: 37 U/L (ref 26–192)
CRP SERPL QL: 31.7 MG/L
D DIMER PPP FEU-MCNC: 0.67 UG/ML FEU
EOSINOPHIL # BLD AUTO: 0.11 THOUSAND/ΜL (ref 0–0.61)
EOSINOPHIL NFR BLD AUTO: 1 % (ref 0–6)
ERYTHROCYTE [DISTWIDTH] IN BLOOD BY AUTOMATED COUNT: 17 % (ref 11.6–15.1)
GLUCOSE SERPL-MCNC: 116 MG/DL (ref 65–140)
GLUCOSE SERPL-MCNC: 117 MG/DL (ref 65–140)
GLUCOSE SERPL-MCNC: 215 MG/DL (ref 65–140)
GLUCOSE SERPL-MCNC: 224 MG/DL (ref 65–140)
GLUCOSE SERPL-MCNC: 402 MG/DL (ref 65–140)
GLUCOSE SERPL-MCNC: 434 MG/DL (ref 65–140)
GLUCOSE SERPL-MCNC: >500 MG/DL (ref 65–140)
HCT VFR BLD AUTO: 31.2 % (ref 34.8–46.1)
HGB BLD-MCNC: 9.5 G/DL (ref 11.5–15.4)
IMM GRANULOCYTES # BLD AUTO: 0.04 THOUSAND/UL (ref 0–0.2)
IMM GRANULOCYTES NFR BLD AUTO: 1 % (ref 0–2)
LYMPHOCYTES # BLD AUTO: 2.03 THOUSANDS/ΜL (ref 0.6–4.47)
LYMPHOCYTES NFR BLD AUTO: 25 % (ref 14–44)
MAGNESIUM SERPL-MCNC: 1.6 MG/DL (ref 1.9–2.7)
MCH RBC QN AUTO: 24.2 PG (ref 26.8–34.3)
MCHC RBC AUTO-ENTMCNC: 30.4 G/DL (ref 31.4–37.4)
MCV RBC AUTO: 80 FL (ref 82–98)
MONOCYTES # BLD AUTO: 0.73 THOUSAND/ΜL (ref 0.17–1.22)
MONOCYTES NFR BLD AUTO: 9 % (ref 4–12)
NEUTROPHILS # BLD AUTO: 5.19 THOUSANDS/ΜL (ref 1.85–7.62)
NEUTS SEG NFR BLD AUTO: 64 % (ref 43–75)
NRBC BLD AUTO-RTO: 0 /100 WBCS
P AXIS: 11 DEGREES
PLATELET # BLD AUTO: 395 THOUSANDS/UL (ref 149–390)
PMV BLD AUTO: 11 FL (ref 8.9–12.7)
PR INTERVAL: 173 MS
PROCALCITONIN SERPL-MCNC: 0.09 NG/ML
QRS AXIS: -47 DEGREES
QRSD INTERVAL: 135 MS
QT INTERVAL: 542 MS
QTC INTERVAL: 581 MS
RBC # BLD AUTO: 3.92 MILLION/UL (ref 3.81–5.12)
T WAVE AXIS: 18 DEGREES
VENTRICULAR RATE: 69 BPM
WBC # BLD AUTO: 8.13 THOUSAND/UL (ref 4.31–10.16)

## 2022-05-20 PROCEDURE — 94664 DEMO&/EVAL PT USE INHALER: CPT

## 2022-05-20 PROCEDURE — 84484 ASSAY OF TROPONIN QUANT: CPT | Performed by: EMERGENCY MEDICINE

## 2022-05-20 PROCEDURE — 94760 N-INVAS EAR/PLS OXIMETRY 1: CPT

## 2022-05-20 PROCEDURE — 99232 SBSQ HOSP IP/OBS MODERATE 35: CPT | Performed by: INTERNAL MEDICINE

## 2022-05-20 PROCEDURE — XW033E5 INTRODUCTION OF REMDESIVIR ANTI-INFECTIVE INTO PERIPHERAL VEIN, PERCUTANEOUS APPROACH, NEW TECHNOLOGY GROUP 5: ICD-10-PCS | Performed by: INTERNAL MEDICINE

## 2022-05-20 PROCEDURE — 85379 FIBRIN DEGRADATION QUANT: CPT

## 2022-05-20 PROCEDURE — 93010 ELECTROCARDIOGRAM REPORT: CPT | Performed by: INTERNAL MEDICINE

## 2022-05-20 PROCEDURE — 82948 REAGENT STRIP/BLOOD GLUCOSE: CPT

## 2022-05-20 PROCEDURE — 85025 COMPLETE CBC W/AUTO DIFF WBC: CPT

## 2022-05-20 PROCEDURE — 94640 AIRWAY INHALATION TREATMENT: CPT

## 2022-05-20 RX ORDER — METHYLPREDNISOLONE SODIUM SUCCINATE 40 MG/ML
40 INJECTION, POWDER, LYOPHILIZED, FOR SOLUTION INTRAMUSCULAR; INTRAVENOUS EVERY 24 HOURS
Status: DISCONTINUED | OUTPATIENT
Start: 2022-05-21 | End: 2022-05-22

## 2022-05-20 RX ORDER — FAMOTIDINE 40 MG/5ML
20 POWDER, FOR SUSPENSION ORAL 2 TIMES DAILY
Status: DISCONTINUED | OUTPATIENT
Start: 2022-05-20 | End: 2022-05-23 | Stop reason: HOSPADM

## 2022-05-20 RX ORDER — METHYLPREDNISOLONE SODIUM SUCCINATE 40 MG/ML
40 INJECTION, POWDER, LYOPHILIZED, FOR SOLUTION INTRAMUSCULAR; INTRAVENOUS EVERY 24 HOURS
Status: DISCONTINUED | OUTPATIENT
Start: 2022-05-20 | End: 2022-05-20

## 2022-05-20 RX ORDER — SPIRONOLACTONE 25 MG/1
100 TABLET ORAL DAILY
Status: DISCONTINUED | OUTPATIENT
Start: 2022-05-20 | End: 2022-05-23 | Stop reason: HOSPADM

## 2022-05-20 RX ORDER — LANOLIN ALCOHOL/MO/W.PET/CERES
6 CREAM (GRAM) TOPICAL
Status: DISCONTINUED | OUTPATIENT
Start: 2022-05-20 | End: 2022-05-23 | Stop reason: HOSPADM

## 2022-05-20 RX ORDER — INSULIN LISPRO 100 [IU]/ML
1-6 INJECTION, SOLUTION INTRAVENOUS; SUBCUTANEOUS
Status: DISCONTINUED | OUTPATIENT
Start: 2022-05-20 | End: 2022-05-23 | Stop reason: HOSPADM

## 2022-05-20 RX ORDER — ATORVASTATIN CALCIUM 40 MG/1
40 TABLET, FILM COATED ORAL
Status: DISCONTINUED | OUTPATIENT
Start: 2022-05-20 | End: 2022-05-23 | Stop reason: HOSPADM

## 2022-05-20 RX ORDER — LORAZEPAM 0.5 MG/1
0.5 TABLET ORAL 2 TIMES DAILY PRN
Status: DISCONTINUED | OUTPATIENT
Start: 2022-05-20 | End: 2022-05-20

## 2022-05-20 RX ORDER — GUAIFENESIN 600 MG
600 TABLET, EXTENDED RELEASE 12 HR ORAL EVERY 12 HOURS SCHEDULED
Status: DISCONTINUED | OUTPATIENT
Start: 2022-05-20 | End: 2022-05-23 | Stop reason: HOSPADM

## 2022-05-20 RX ORDER — LORAZEPAM 0.5 MG/1
0.5 TABLET ORAL EVERY 6 HOURS PRN
Status: DISCONTINUED | OUTPATIENT
Start: 2022-05-20 | End: 2022-05-23 | Stop reason: HOSPADM

## 2022-05-20 RX ORDER — TRAZODONE HYDROCHLORIDE 50 MG/1
25 TABLET ORAL
Status: DISCONTINUED | OUTPATIENT
Start: 2022-05-20 | End: 2022-05-20

## 2022-05-20 RX ORDER — ESCITALOPRAM OXALATE 10 MG/1
10 TABLET ORAL DAILY
Status: DISCONTINUED | OUTPATIENT
Start: 2022-05-20 | End: 2022-05-20

## 2022-05-20 RX ORDER — FUROSEMIDE 10 MG/ML
40 INJECTION INTRAMUSCULAR; INTRAVENOUS 2 TIMES DAILY
Status: DISCONTINUED | OUTPATIENT
Start: 2022-05-20 | End: 2022-05-23 | Stop reason: HOSPADM

## 2022-05-20 RX ORDER — TRAZODONE HYDROCHLORIDE 50 MG/1
50 TABLET ORAL
Status: DISCONTINUED | OUTPATIENT
Start: 2022-05-20 | End: 2022-05-20

## 2022-05-20 RX ORDER — LOSARTAN POTASSIUM 50 MG/1
50 TABLET ORAL DAILY
Status: DISCONTINUED | OUTPATIENT
Start: 2022-05-20 | End: 2022-05-23 | Stop reason: HOSPADM

## 2022-05-20 RX ORDER — AMIODARONE HYDROCHLORIDE 200 MG/1
100 TABLET ORAL
Status: DISCONTINUED | OUTPATIENT
Start: 2022-05-20 | End: 2022-05-23 | Stop reason: HOSPADM

## 2022-05-20 RX ORDER — INSULIN LISPRO 100 [IU]/ML
6 INJECTION, SOLUTION INTRAVENOUS; SUBCUTANEOUS ONCE
Status: COMPLETED | OUTPATIENT
Start: 2022-05-20 | End: 2022-05-20

## 2022-05-20 RX ORDER — BENZONATATE 100 MG/1
100 CAPSULE ORAL 3 TIMES DAILY PRN
Status: DISCONTINUED | OUTPATIENT
Start: 2022-05-20 | End: 2022-05-23 | Stop reason: HOSPADM

## 2022-05-20 RX ORDER — PREGABALIN 75 MG/1
75 CAPSULE ORAL DAILY
Status: DISCONTINUED | OUTPATIENT
Start: 2022-05-20 | End: 2022-05-23 | Stop reason: HOSPADM

## 2022-05-20 RX ORDER — POLYETHYLENE GLYCOL 3350 17 G/17G
17 POWDER, FOR SOLUTION ORAL DAILY
Status: DISCONTINUED | OUTPATIENT
Start: 2022-05-20 | End: 2022-05-23 | Stop reason: HOSPADM

## 2022-05-20 RX ORDER — LEVALBUTEROL 1.25 MG/.5ML
1.25 SOLUTION, CONCENTRATE RESPIRATORY (INHALATION)
Status: DISCONTINUED | OUTPATIENT
Start: 2022-05-20 | End: 2022-05-20

## 2022-05-20 RX ORDER — LEVALBUTEROL INHALATION SOLUTION 1.25 MG/3ML
1.25 SOLUTION RESPIRATORY (INHALATION)
Status: DISCONTINUED | OUTPATIENT
Start: 2022-05-20 | End: 2022-05-23 | Stop reason: HOSPADM

## 2022-05-20 RX ADMIN — INSULIN HUMAN 5 UNITS: 100 INJECTION, SOLUTION PARENTERAL at 00:52

## 2022-05-20 RX ADMIN — GUAIFENESIN 600 MG: 600 TABLET ORAL at 08:13

## 2022-05-20 RX ADMIN — LORAZEPAM 0.5 MG: 0.5 TABLET ORAL at 08:08

## 2022-05-20 RX ADMIN — GUAIFENESIN 600 MG: 600 TABLET ORAL at 20:55

## 2022-05-20 RX ADMIN — INSULIN GLARGINE 8 UNITS: 100 INJECTION, SOLUTION SUBCUTANEOUS at 00:19

## 2022-05-20 RX ADMIN — APIXABAN 5 MG: 5 TABLET, FILM COATED ORAL at 17:34

## 2022-05-20 RX ADMIN — INSULIN LISPRO 3 UNITS: 100 INJECTION, SOLUTION INTRAVENOUS; SUBCUTANEOUS at 08:14

## 2022-05-20 RX ADMIN — FUROSEMIDE 40 MG: 10 INJECTION, SOLUTION INTRAMUSCULAR; INTRAVENOUS at 00:52

## 2022-05-20 RX ADMIN — FAMOTIDINE 20 MG: 40 POWDER, FOR SUSPENSION ORAL at 17:34

## 2022-05-20 RX ADMIN — TICAGRELOR 90 MG: 90 TABLET ORAL at 20:56

## 2022-05-20 RX ADMIN — INSULIN LISPRO 3 UNITS: 100 INJECTION, SOLUTION INTRAVENOUS; SUBCUTANEOUS at 17:27

## 2022-05-20 RX ADMIN — INSULIN GLARGINE 8 UNITS: 100 INJECTION, SOLUTION SUBCUTANEOUS at 20:54

## 2022-05-20 RX ADMIN — METOPROLOL TARTRATE 25 MG: 25 TABLET, FILM COATED ORAL at 20:53

## 2022-05-20 RX ADMIN — REMDESIVIR 200 MG: 100 INJECTION, POWDER, LYOPHILIZED, FOR SOLUTION INTRAVENOUS at 00:58

## 2022-05-20 RX ADMIN — IPRATROPIUM BROMIDE 0.5 MG: 0.5 SOLUTION RESPIRATORY (INHALATION) at 07:56

## 2022-05-20 RX ADMIN — FUROSEMIDE 40 MG: 10 INJECTION, SOLUTION INTRAMUSCULAR; INTRAVENOUS at 08:13

## 2022-05-20 RX ADMIN — LOSARTAN POTASSIUM 50 MG: 50 TABLET, FILM COATED ORAL at 08:08

## 2022-05-20 RX ADMIN — SPIRONOLACTONE 100 MG: 25 TABLET ORAL at 08:07

## 2022-05-20 RX ADMIN — FUROSEMIDE 40 MG: 10 INJECTION, SOLUTION INTRAMUSCULAR; INTRAVENOUS at 17:34

## 2022-05-20 RX ADMIN — ATORVASTATIN CALCIUM 40 MG: 40 TABLET, FILM COATED ORAL at 17:34

## 2022-05-20 RX ADMIN — GUAIFENESIN 600 MG: 600 TABLET ORAL at 02:27

## 2022-05-20 RX ADMIN — LEVALBUTEROL HYDROCHLORIDE 1.25 MG: 1.25 SOLUTION RESPIRATORY (INHALATION) at 14:36

## 2022-05-20 RX ADMIN — Medication 6 MG: at 21:06

## 2022-05-20 RX ADMIN — INSULIN LISPRO 6 UNITS: 100 INJECTION, SOLUTION INTRAVENOUS; SUBCUTANEOUS at 02:27

## 2022-05-20 RX ADMIN — LEVALBUTEROL HYDROCHLORIDE 1.25 MG: 1.25 SOLUTION RESPIRATORY (INHALATION) at 07:56

## 2022-05-20 RX ADMIN — FAMOTIDINE 20 MG: 40 POWDER, FOR SUSPENSION ORAL at 08:15

## 2022-05-20 RX ADMIN — AMIODARONE HYDROCHLORIDE 100 MG: 200 TABLET ORAL at 08:07

## 2022-05-20 RX ADMIN — INSULIN LISPRO 5 UNITS: 100 INJECTION, SOLUTION INTRAVENOUS; SUBCUTANEOUS at 20:55

## 2022-05-20 RX ADMIN — LEVALBUTEROL HYDROCHLORIDE 1.25 MG: 1.25 SOLUTION RESPIRATORY (INHALATION) at 20:22

## 2022-05-20 RX ADMIN — IPRATROPIUM BROMIDE 0.5 MG: 0.5 SOLUTION RESPIRATORY (INHALATION) at 00:52

## 2022-05-20 RX ADMIN — BENZONATATE 100 MG: 100 CAPSULE ORAL at 23:57

## 2022-05-20 RX ADMIN — TICAGRELOR 90 MG: 90 TABLET ORAL at 00:50

## 2022-05-20 RX ADMIN — INSULIN LISPRO 3 UNITS: 100 INJECTION, SOLUTION INTRAVENOUS; SUBCUTANEOUS at 12:13

## 2022-05-20 RX ADMIN — METOPROLOL TARTRATE 25 MG: 25 TABLET, FILM COATED ORAL at 00:50

## 2022-05-20 RX ADMIN — INSULIN LISPRO 6 UNITS: 100 INJECTION, SOLUTION INTRAVENOUS; SUBCUTANEOUS at 00:18

## 2022-05-20 RX ADMIN — LEVALBUTEROL HYDROCHLORIDE 1.25 MG: 1.25 SOLUTION RESPIRATORY (INHALATION) at 00:52

## 2022-05-20 RX ADMIN — TICAGRELOR 90 MG: 90 TABLET ORAL at 08:14

## 2022-05-20 RX ADMIN — INSULIN LISPRO 6 UNITS: 100 INJECTION, SOLUTION INTRAVENOUS; SUBCUTANEOUS at 02:28

## 2022-05-20 RX ADMIN — INSULIN LISPRO 2 UNITS: 100 INJECTION, SOLUTION INTRAVENOUS; SUBCUTANEOUS at 17:27

## 2022-05-20 RX ADMIN — IPRATROPIUM BROMIDE 0.5 MG: 0.5 SOLUTION RESPIRATORY (INHALATION) at 14:36

## 2022-05-20 RX ADMIN — LORAZEPAM 0.5 MG: 0.5 TABLET ORAL at 14:23

## 2022-05-20 RX ADMIN — IPRATROPIUM BROMIDE 0.5 MG: 0.5 SOLUTION RESPIRATORY (INHALATION) at 20:22

## 2022-05-20 RX ADMIN — PREGABALIN 75 MG: 75 CAPSULE ORAL at 08:07

## 2022-05-20 RX ADMIN — APIXABAN 5 MG: 5 TABLET, FILM COATED ORAL at 08:07

## 2022-05-20 RX ADMIN — LORAZEPAM 0.5 MG: 0.5 TABLET ORAL at 20:52

## 2022-05-20 RX ADMIN — Medication 6 MG: at 00:50

## 2022-05-20 RX ADMIN — METOPROLOL TARTRATE 25 MG: 25 TABLET, FILM COATED ORAL at 08:28

## 2022-05-20 NOTE — ASSESSMENT & PLAN NOTE
Wt Readings from Last 3 Encounters:   05/20/22 89 2 kg (196 lb 10 4 oz)   05/11/22 86 2 kg (190 lb)   03/28/22 86 kg (189 lb 9 5 oz)     · Complains of SOB and orthopnea  · Echo 04/2022:  LVEF 50%, moderate hypokinesis of inferior and basal anterolateral walls, moderate concentric hypertrophy  · CT revealing pulmonary edema and severe cardiomegaly, BNP elevated  · Given lasix 40mg IV now, continue BID dosing  · Continue BB  · I&Os, daily weights, fluid restriction

## 2022-05-20 NOTE — H&P
[address] 1965, 55 y o  female MRN: [id number]  Unit/Bed#: -01 Encounter: [id number]  Primary Care Provider: Jeanetta Schwab, MD   Date and time admitted to hospital: 5/19/2022  6:03 PM    * Shortness of breath  Assessment & Plan  · Appears multifactorial due to mild CHF exacerbation, COVID-19 infection and mucus plugging of the trach collar (Was seen in ED on 05/11 and had trach collar replaced)  · Patient also with chronic anemia, Hgb within baseline   · Afebrile, no leukocytosis, does not meet SIRS sepsis criteria  · CT chest/abdomen/pelvis:  Pulmonary edema likely secondary CHF  Severe cardiomegaly  Unchanged 42 mm ascending aortic aneurysm  · Currently at baseline oxygen requirements  · See treatment plan as presented below    COVID-19  Assessment & Plan  · Patient with mild symptoms, and currently at baseline oxygen requirement   However patient is high risk therefore while hospitalized will treat with Solu-Medrol and remdesivir  · , Trop 20 --> 24, CK pending, CRP pending  · Check D-dimer  · Monitor respiratory status notify provider for increasing oxygen requirements  · Respiratory protocol    Acute on chronic combined systolic and diastolic heart failure (HCC)  Assessment & Plan  Wt Readings from Last 3 Encounters:   05/20/22 89 2 kg (196 lb 10 4 oz)   05/11/22 86 2 kg (190 lb)   03/28/22 86 kg (189 lb 9 5 oz)     · Complains of SOB and orthopnea  · Echo 04/2022:  LVEF 50%, moderate hypokinesis of inferior and basal anterolateral walls, moderate concentric hypertrophy  · CT revealing pulmonary edema and severe cardiomegaly, BNP elevated  · Given lasix 40mg IV now, continue BID dosing  · Continue BB  · I&Os, daily weights, fluid restriction        Prolonged Q-T interval on ECG  Assessment & Plan  ·   · Hold Lexapro and trazodone  · Repeat EKG    Chronic hypoxemic respiratory failure (HCC)  Assessment & Plan  · Chronically on 10 L trach mask  · Continue tracheostomy care    Hypokalemia  Assessment & Plan  · K 3 2  · Monitor and replete p r n  Type 2 diabetes mellitus treated with insulin [facility])  Assessment & Plan  Lab Results   Component Value Date    HGBA1C 6 6 (H) 01/01/2022       Recent Labs     05/20/22  0010   POCGLU >500*       Blood Sugar Average: Last 72 hrs:  ·  home regimen:  Lantus 8 units HS, Humalog coverage 3 units t i d  With meals  · Continue  · Add SSI Accu-Cheks a c  HS  · Hyperglycemic on admission, reports he is not appear in DKA  Will give additional Humulin coverage now  · Recheck at 2:00 a m  VTE Pharmacologic Prophylaxis: VTE Score: 5 Eliquis  Code Status: Level 1 - Full Code   Discussion with family: Patient declined call to   Anticipated Length of Stay: Patient will be admitted on an observation basis with an anticipated length of stay of less than 2 midnights secondary to Shortness of breath, CHF exacerbation  Total Time for Visit, including Counseling / Coordination of Care: 60 minutes Greater than 50% of this total time spent on direct patient counseling and coordination of care  Chief Complaint:  Shortness of breath    History of Present Illness:  Ezequiel Peterson is a 55 y o  female with a PMH of DM, HF, chronic respiratory failure with tracheostomy who presents with SOB and left sided abdominal pain  States at times exam, abdominal pain has now resolved  Reports increasing SOB over the past week made worse with exertion and lying flat  Also with increased coughing and secretions from her tracheostomy site  She denies fevers, chills, chest pain, palpitation  States she was seen in the ED on 05/11 for same and found to have mucus plugging in her trach  Again with mucus plug tonight  Patient also tested positive for COVID-19  On CT, patient was found to have pulmonary edema  Review of Systems:  Review of Systems   Constitutional: Negative for chills, fatigue and fever     HENT: Negative for congestion  Eyes: Negative for visual disturbance  Respiratory: Positive for cough, shortness of breath and wheezing  Negative for chest tightness  Cardiovascular: Negative for chest pain, palpitations and leg swelling  Gastrointestinal: Negative for abdominal pain, diarrhea, rectal pain and vomiting  Endocrine: Negative for polyuria  Genitourinary: Negative for difficulty urinating, dysuria, frequency and urgency  Musculoskeletal: Positive for myalgias  Skin: Negative for rash  Neurological: Negative for dizziness  Psychiatric/Behavioral: Negative for sleep disturbance  Past Medical and Surgical History:   Past Medical History:   Diagnosis Date    Anxiety     Aortic aneurysm ([address] )     Arthritis     Depression     Diabetes mellitus (HCC)     Fibromyalgia     GERD (gastroesophageal reflux disease)     GERD (gastroesophageal reflux disease)     H/O cardiovascular stress test 09/2018    no ischemia  EF 70%   H/O echocardiogram 01/2019    EF 60%  Mild LVH  trivial effusion   Hyperlipidemia     Hypertension     Migraines     Psychiatric disorder     anxiety    Uncontrolled hypertension 2/25/2015    Last Assessment & Plan:  BP today above goal <140/90, apparently asymptomatic  Prior BP elevations were attributed to not taking medication  Consider increased medication if persistent on f/u  Past Surgical History:   Procedure Laterality Date    BACK SURGERY      Lumbar epidural steroid injection    CARDIAC CATHETERIZATION N/A 10/22/2021    Procedure: Cardiac pci;  Surgeon: Sofia Castanon MD;  [facility]; Service: Cardiology    CARDIAC CATHETERIZATION  10/22/2021    Procedure: Cardiac catheterization;  Surgeon: Sofia Castanon MD;  [facility]; Service: Cardiology    CARDIAC CATHETERIZATION Left 10/27/2021    Procedure: Cardiac Left Heart Cath;  Surgeon: Christiana Najera MD;  [facility];   Service: Cardiology    CARDIAC CATHETERIZATION N/A 10/27/2021    Procedure: Cardiac pci;  Surgeon: Alex Patel MD;  [facility]; Service: Cardiology    CARDIAC CATHETERIZATION N/A 10/28/2021    Procedure: Cardiac pci;  Surgeon: Rivas Herr DO;  [facility]; Service: Cardiology    CARPAL TUNNEL RELEASE Left      SECTION      CHOLECYSTECTOMY      COLONOSCOPY      incomplete    COLONOSCOPY      EYE SURGERY      HYSTERECTOMY      Total    OVARIAN CYST REMOVAL      PEG W/TRACHEOSTOMY PLACEMENT N/A 2021    Procedure: TRACHEOSTOMY WITH INSERTION PEG TUBE;  Surgeon: Brad Fung DO;  Location: [facility];  Service: General    TUBAL LIGATION      UPPER GASTROINTESTINAL ENDOSCOPY         Meds/Allergies:  Prior to Admission medications    Medication Sig Start Date End Date Taking?  Authorizing Provider   acetaminophen (TYLENOL) 160 mg/5 mL suspension 30 4 mL (975 mg total) by Per G Tube route every 6 (six) hours as needed for mild pain or fever 21   Bella Puentes PA-C   albuterol (2 5 mg/3 mL) 0 083 % nebulizer solution Take 3 mL (2 5 mg total) by nebulization every 4 (four) hours as needed for wheezing or shortness of breath 22   Bernie Records, MD   amiodarone 100 mg tablet Take 1 tablet (100 mg total) by mouth daily with breakfast 3/21/22   Bernie Records, MD   apixaban (ELIQUIS) 5 mg Take 1 tablet (5 mg total) by mouth 2 (two) times a day 3/21/22   Bernie Records, MD   atorvastatin (LIPITOR) 40 mg tablet Take 1 tablet (40 mg total) by mouth daily 3/21/22   Bernie Records, MD Magda Sánchez 100 units/mL injection pen INJECT 8 UNITS UNDER THE SKIN DAILY 3/22/22   Bernie Records, MD   Benzocaine-Menthol 15-2 6 MG LOZG Apply 1 lozenge to the mouth or throat 4 (four) times a day as needed (sore throat) 22   Ekta Pinon MD   Blood Pressure Monitoring (Sphygmomanometer) MISC Use 2 (two) times a day  Patient not taking: Reported on 2021   Tiesha Almanza [id number], MD   chlorhexidine (PERIDEX) 0 12 % solution Apply 15 mL to the mouth or throat every 12 (twelve) hours 11/23/21   Bella Puentes PA-C   escitalopram (LEXAPRO) 10 mg tablet TAKE 1 TABLET BY MOUTH EVERY DAY 5/16/22   Jeremias Peralta MD   famotidine (PEPCID) 20 mg/2 5 mL oral suspension Take 2 5 mL (20 mg total) by mouth 2 (two) times a day 11/23/21   Bella Puentes PA-C   furosemide (LASIX) 40 mg tablet Take 1 tablet (40 mg total) by mouth daily 3/29/22 4/28/22  Jeanine Escobedo MD   insulin aspart (NovoLOG FlexPen) 100 UNIT/ML injection pen Inject 2 Units under the skin 3 (three) times a day with meals 3/23/22   Jeremias Peralta MD   Insulin Pen Needle (Novofine Pen Needle) 32G X 6 MM MISC Use 3 (three) times a day with meals 3/23/22   Jeremias Peralta MD   Insulin Pen Needle (UNIFINE PENTIPS PLUS) 31G X 6 MM MISC 1 Device by Does not apply route 4 (four) times a day 6/5/17   Historical Provider, MD   ipratropium (ATROVENT) 0 02 % nebulizer solution Take 2 5 mL (0 5 mg total) by nebulization 3 (three) times a day 11/23/21   Abdifatah Smith PA-C   Lancets MISC 1 Device by Does not apply route 4 (four) times a day  Patient not taking: Reported on 7/13/2021    Historical Provider, MD   levalbuterol (XOPENEX) 1 25 mg/0 5 mL nebulizer solution Take 0 5 mL (1 25 mg total) by nebulization 3 (three) times a day 11/23/21   Bella Puentes PA-C   LORazepam (Ativan) 1 mg tablet Take 0 5 tablets (0 5 mg total) by mouth 2 (two) times a day as needed for anxiety 4/7/22   Jeremias Peralta MD   losartan (COZAAR) 50 mg tablet Take 1 tablet (50 mg total) by mouth daily 3/21/22   Jeremias Peralta MD   melatonin 3 mg Take 2 tablets (6 mg total) by mouth daily at bedtime 3/15/22   Jeremias Peralta MD   metoprolol tartrate (LOPRESSOR) 25 mg tablet Take 1 tablet (25 mg total) by mouth every 12 (twelve) hours 3/21/22   Jeremias Peralta MD   polyethylene glycol (MIRALAX) 17 g packet Take 17 g by mouth daily 11/23/21   Bella Puentes PA-C   potassium chloride 10% oral solution Take 15 mL (20 mEq total) by mouth 2 (two) times a day for 1 dose 3/28/22 3/29/22  Asif Melara MD   predniSONE 10 mg tablet Take 4 tabs PO Q day x 5 days 5/11/22   Valentine Espinal MD   pregabalin (LYRICA) 75 mg capsule TAKE ONE CAPSULE EVERY DAY 1/29/21   Leigh Avila MD   senna-docusate sodium (SENOKOT S) 8 6-50 mg per tablet Take 1 tablet by mouth daily at bedtime 11/23/21   Bella Puentes PA-C   spironolactone (ALDACTONE) 100 mg tablet Take 1 tablet (100 mg total) by mouth daily 3/21/22   Berenice Thomas MD   ticagrelor (BRILINTA) 90 MG Take 1 tablet (90 mg total) by mouth every 12 (twelve) hours 3/21/22   Berenice Thomas MD   traZODone (DESYREL) 50 mg tablet TAKE 0 5 TABLET (25MG) BY MOUTH NIGHTLY AS NEEDED FOR SLEEP  2/10/22   Historical Provider, MD   pantoprazole (PROTONIX) 20 mg tablet Take 2 tablets (40 mg total) by mouth daily 5/17/21 7/7/21  Berenice Thomas MD     I have reviewed home medications with patient personally  Allergies: Allergies   Allergen Reactions    Metformin Diarrhea    Clonidine Rash     Patch        Social History:  Marital Status: Legally    Occupation:   Patient Pre-hospital Living Situation: Home  Patient Pre-hospital Level of Mobility: walks with cane  Patient Pre-hospital Diet Restrictions:   Substance Use History:   Social History     Substance and Sexual Activity   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        Social History     Tobacco Use   Smoking Status Former Smoker    Packs/day: 1 00    Years: 30 00    Pack years: 30 00    Types: Cigarettes   Smokeless Tobacco Never Used     Social History     Substance and Sexual Activity   Drug Use Yes    Types: Marijuana    Comment: medical; seldom use       Family History:  Family History   Problem Relation Age of Onset    Hypertension Mother     Arthritis Mother     Diabetes Father     Other Sister         renal cell carcinoma    Diabetes Other     Factor V Leiden deficiency Other        Physical Exam:     Vitals:   Blood Pressure: 166/82 (05/19/22 2300)  Pulse: 72 (05/19/22 2300)  Temperature: 98 1 °F (36 7 °C) (05/19/22 2300)  Temp Source: Oral (05/19/22 2300)  Respirations: 18 (05/19/22 2300)  Height: 5' 9" (175 3 cm) (05/19/22 2300)  Weight - Scale: 89 2 kg (196 lb 10 4 oz) (05/20/22 0002)  SpO2: 99 % (05/20/22 0055)    Physical Exam  Vitals and nursing note reviewed  Constitutional:       General: She is not in acute distress  Appearance: She is not toxic-appearing  HENT:      Head: Normocephalic and atraumatic  Nose: Congestion present  Mouth/Throat:      Mouth: Mucous membranes are dry  Neck:      Comments: Trach site with trach mask  Noted to have thick green to yellow secretions  Cardiovascular:      Rate and Rhythm: Normal rate and regular rhythm  Pulses: Normal pulses  Heart sounds: Normal heart sounds  No murmur heard  Pulmonary:      Effort: Pulmonary effort is normal       Breath sounds: No wheezing or rales  Comments: Decreased breath sounds  Abdominal:      General: Abdomen is flat  Bowel sounds are normal  There is no distension  Palpations: Abdomen is soft  Tenderness: There is no abdominal tenderness  There is no guarding  Musculoskeletal:      Right lower leg: No edema  Left lower leg: No edema  Skin:     General: Skin is warm and dry  Capillary Refill: Capillary refill takes less than 2 seconds  Neurological:      General: No focal deficit present  Mental Status: She is alert and oriented to person, place, and time     Psychiatric:         Mood and Affect: Mood normal          Behavior: Behavior normal         Additional Data:   Lab Results:  Results from last 7 days   Lab Units 05/19/22  1848   WBC Thousand/uL 8 41   HEMOGLOBIN g/dL 10 1*   HEMATOCRIT % 33 1*   PLATELETS Thousands/uL 409*   NEUTROS PCT % 75   LYMPHS PCT % 16   MONOS PCT % 7   EOS PCT % 1     Results from last 7 days   Lab Units 05/19/22  1848   SODIUM mmol/L 134*   POTASSIUM mmol/L 3 2*   CHLORIDE mmol/L 100   CO2 mmol/L 26   BUN mg/dL 15   CREATININE mg/dL 0 84   ANION GAP mmol/L 8   CALCIUM mg/dL 8 3*   ALBUMIN g/dL 3 3*   TOTAL BILIRUBIN mg/dL 0 53   ALK PHOS U/L 94   ALT U/L 7   AST U/L 7*   GLUCOSE RANDOM mg/dL 474*         Results from last 7 days   Lab Units 05/20/22  0010   POC GLUCOSE mg/dl >500*               Imaging: Reviewed radiology reports from this admission including: CT chest/abd/pelvis  CT chest abdomen pelvis wo contrast   Final Result by Alice Reyes MD (05/19 2035)      Pulmonary edema likely secondary to CHF  Severe cardiomegaly  Unchanged 42 mm ascending aortic aneurysm      No acute findings in the abdomen or pelvis  The study was marked in [facility] for immediate notification  Workstation performed: [id number]             EKG and Other Studies Reviewed on Admission:   · EKG:  NSR, prolonged QTC, known RBB, LAFB    ** Please Note: This note has been constructed using a voice recognition system   **

## 2022-05-20 NOTE — NURSING NOTE
Patient arrived to the unit, ambulated from the stretcher to the bed with standby assist  Patient requested a skin assessment at a later time due to feeling short of breath  [facility]  Emergency trach care at the bedside, suction, and emergency replacement  Blood sugar >500, provider notified and medication administered  0200 recheck blood sugar 402- provider notified and orders obtained  12 units given per providers orders

## 2022-05-20 NOTE — ASSESSMENT & PLAN NOTE
· Appears multifactorial due to mild CHF exacerbation, COVID-19 infection, anxiety  · Patient also with chronic anemia, Hgb within baseline   · Afebrile, no leukocytosis, does not meet SIRS sepsis criteria  · CT chest/abdomen/pelvis:  Pulmonary edema likely secondary CHF  Severe cardiomegaly  Unchanged 42 mm ascending aortic aneurysm     · Currently at baseline oxygen requirements  · See treatment plan as presented below

## 2022-05-20 NOTE — ASSESSMENT & PLAN NOTE
Wt Readings from Last 3 Encounters:   05/20/22 89 2 kg (196 lb 10 4 oz)   05/11/22 86 2 kg (190 lb)   03/28/22 86 kg (189 lb 9 5 oz)     ·  SOB and orthopnea-improving  · Echo 04/2022:  LVEF 50%, moderate hypokinesis of inferior and basal anterolateral walls, moderate concentric hypertrophy  · CT revealing pulmonary edema and severe cardiomegaly, BNP elevated  ·  continue Lasix BID dosing  · Continue BB  · I&Os, daily weights, fluid restriction

## 2022-05-20 NOTE — ED CARE HANDOFF
Emergency Department Sign Out Note        Signout and transfer of care from my colleague, Dr Rosina Camacho  See Separate Emergency Department note  The patient, Joaquim Newman, was evaluated for shortness of breath  Labs Reviewed   COVID19, INFLUENZA A/B, RSV PCR, [facility] - Abnormal       Result Value Ref Range Status    SARS-CoV-2 Positive (*) Negative Final    INFLUENZA A PCR Negative  Negative Final    INFLUENZA B PCR Negative  Negative Final    RSV PCR Negative  Negative Final    Narrative:     FOR PEDIATRIC PATIENTS - copy/paste COVID Guidelines URL to browser: [email or web address]    SARS-CoV-2 assay is a Nucleic Acid Amplification assay intended for the  qualitative detection of nucleic acid from SARS-CoV-2 in nasopharyngeal  swabs  Results are for the presumptive identification of SARS-CoV-2 RNA  Positive results are indicative of infection with SARS-CoV-2, the virus  causing COVID-19, but do not rule out bacterial infection or co-infection  with other viruses  Laboratories within the [place] and its  territories are required to report all positive results to the appropriate  public health authorities  Negative results do not preclude SARS-CoV-2  infection and should not be used as the sole basis for treatment or other  patient management decisions  Negative results must be combined with  clinical observations, patient history, and epidemiological information  This test has not been FDA cleared or approved  This test has been authorized by FDA under an Emergency Use Authorization  (EUA)  This test is only authorized for the duration of time the  declaration that circumstances exist justifying the authorization of the  emergency use of an in vitro diagnostic tests for detection of SARS-CoV-2  virus and/or diagnosis of COVID-19 infection under section 564(b)(1) of  the Act, 21 U  S C  892DNP-8(J)(0), unless the authorization is terminated  or revoked sooner  The test has been validated but independent review by FDA  and CLIA is pending  Test performed using [employer]: This RT-PCR assay targets N2,  a region unique to SARS-CoV-2  A conserved region in the E-gene was chosen  for pan-Sarbecovirus detection which includes SARS-CoV-2  CBC AND DIFFERENTIAL - Abnormal    WBC 8 41  4 31 - 10 16 Thousand/uL Final    RBC 4 16  3 81 - 5 12 Million/uL Final    Hemoglobin 10 1 (*) 11 5 - 15 4 g/dL Final    Hematocrit 33 1 (*) 34 8 - 46 1 % Final    MCV 80 (*) 82 - 98 fL Final    MCH 24 3 (*) 26 8 - 34 3 pg Final    MCHC 30 5 (*) 31 4 - 37 4 g/dL Final    RDW 16 7 (*) 11 6 - 15 1 % Final    MPV 10 6  8 9 - 12 7 fL Final    Platelets 299 (*) 520 - 390 Thousands/uL Final    nRBC 0  /100 WBCs Final    Neutrophils Relative 75  43 - 75 % Final    Immat GRANS % 1  0 - 2 % Final    Lymphocytes Relative 16  14 - 44 % Final    Monocytes Relative 7  4 - 12 % Final    Eosinophils Relative 1  0 - 6 % Final    Basophils Relative 0  0 - 1 % Final    Neutrophils Absolute 6 30  1 85 - 7 62 Thousands/µL Final    Immature Grans Absolute 0 06  0 00 - 0 20 Thousand/uL Final    Lymphocytes Absolute 1 32  0 60 - 4 47 Thousands/µL Final    Monocytes Absolute 0 62  0 17 - 1 22 Thousand/µL Final    Eosinophils Absolute 0 09  0 00 - 0 61 Thousand/µL Final    Basophils Absolute 0 02  0 00 - 0 10 Thousands/µL Final   BASIC METABOLIC PANEL - Abnormal    Sodium 134 (*) 135 - 147 mmol/L Final    Potassium 3 2 (*) 3 5 - 5 3 mmol/L Final    Chloride 100  96 - 108 mmol/L Final    CO2 26  21 - 32 mmol/L Final    ANION GAP 8  4 - 13 mmol/L Final    BUN 15  5 - 25 mg/dL Final    Creatinine 0 84  0 60 - 1 30 mg/dL Final    Comment: Standardized to IDMS reference method    Glucose 474 (*) 65 - 140 mg/dL Final    Comment: If the patient is fasting, the ADA then defines impaired fasting glucose as > 100 mg/dL and diabetes as > or equal to 123 mg/dL    Specimen collection should occur prior to Sulfasalazine administration due to the potential for falsely depressed results  Specimen collection should occur prior to Sulfapyridine administration due to the potential for falsely elevated results  Calcium 8 3 (*) 8 4 - 10 2 mg/dL Final    eGFR 78  ml/min/1 73sq m Final    Narrative:     [place] guidelines for Chronic Kidney Disease (CKD):     Stage 1 with normal or high GFR (GFR > 90 mL/min/1 73 square meters)    Stage 2 Mild CKD (GFR = 60-89 mL/min/1 73 square meters)    Stage 3A Moderate CKD (GFR = 45-59 mL/min/1 73 square meters)    Stage 3B Moderate CKD (GFR = 30-44 mL/min/1 73 square meters)    Stage 4 Severe CKD (GFR = 15-29 mL/min/1 73 square meters)    Stage 5 End Stage CKD (GFR <15 mL/min/1 73 square meters)  Note: GFR calculation is accurate only with a steady state creatinine   HEPATIC FUNCTION PANEL - Abnormal    Total Bilirubin 0 53  0 20 - 1 00 mg/dL Final    Bilirubin, Direct 0 18  0 00 - 0 20 mg/dL Final    Alkaline Phosphatase 94  34 - 104 U/L Final    AST 7 (*) 13 - 39 U/L Final    Comment: Specimen collection should occur prior to Sulfasalazine administration due to the potential for falsely depressed results  ALT 7  7 - 52 U/L Final    Comment: Specimen collection should occur prior to Sulfasalazine administration due to the potential for falsely depressed results  Total Protein 6 8  6 4 - 8 4 g/dL Final    Albumin 3 3 (*) 3 5 - 5 0 g/dL Final   LIPASE - Abnormal    Lipase 9 (*) 11 - 82 u/L Final   B-TYPE NATRIURETIC PEPTIDE (BNP) - Abnormal     (*) 0 - 100 pg/mL Final   HS TROPONIN I 0HR - Normal    hs TnI 0hr 20  "Refer to ACS Flowchart"- see link ng/L Final    Comment:                                              Initial (time 0) result  If >=50 ng/L, Myocardial injury suggested ;  Type of myocardial injury and treatment strategy  to be determined    If 5-49 ng/L, a delta result at 2 hours and or 4 hours will be needed to further evaluate  If <4 ng/L, and chest pain has been >3 hours since onset, patient may qualify for discharge based on the HEART score in the ED  If <5 ng/L and <3hours since onset of chest pain, a delta result at 2 hours will be needed to further evaluate  HS Troponin 99th Percentile URL of a Health Population=12 ng/L with a 95% Confidence Interval of 8-18 ng/L  Second Troponin (time 2 hours)  If calculated delta >= 20 ng/L,  Myocardial injury suggested ; Type of myocardial injury and treatment strategy to be determined  If 5-49 ng/L and the calculated delta is 5-19 ng/L, consult medical service for evaluation  Continue evaluation for ischemia on ecg and other possible etiology and repeat hs troponin at 4 hours  If delta is <5 ng/L at 2 hours, consider discharge based on risk stratification via the HEART score (if in ED), or RALPH risk score in IP/Observation  HS Troponin 99th Percentile URL of a Health Population=12 ng/L with a 95% Confidence Interval of 8-18 ng/L    HS TROPONIN I 2HR         CT chest/abdomen/pelvis showed pulmonary edema  Patient is to be admitted for further evaluation treatment  Troponin and BNP are pending  Troponin was noted at 20  BNP was elevated at 454  Inpatient team was updated on laboratory results  Patient will be admitted(observation status) for further evaluation and treatment                             Procedures  MDM        Disposition  Final diagnoses:   COVID   Dyspnea   Sore throat   Abdominal pain   Tracheostomy care [facility])   Pulmonary edema   Elevated brain natriuretic peptide (BNP) level     Time reflects when diagnosis was documented in both MDM as applicable and the Disposition within this note     Time User Action Codes Description Comment    5/19/2022  7:51 PM Sherry Faster Add [U07 1] COVID     5/19/2022  7:51 PM Sherry Faster Add [R06 00] Dyspnea     5/19/2022  8:23 PM Sherry Faster Add [J02 9] Sore throat     5/19/2022  8:23 PM Sherry Faster Add [R10 9] Abdominal pain     5/19/2022  9:22 PM Temitope Bryant Add [Z43 0] Tracheostomy care ([address] )     5/19/2022  9:23 PM Temitope Bryant Add [J81 1] Pulmonary edema     5/19/2022 10:31 PM Alcira Roof Add [R79 89] Elevated brain natriuretic peptide (BNP) level       ED Disposition     ED Disposition   Admit    Condition   Stable    Date/Time   Thu May 19, 2022 10:31 PM    Comment   Case was reviewed with inpatient team, and the patient's admission status was agreed to be Admission Status: observation status to the service of Dr Johnson Free  Follow-up Information     Follow up With Specialties Details Why Contact Info Additional Information    Kolton Chandler MD Family Medicine  For re-evaluation as soon as possible [facility] [address] [place] [address]  [phone]       [address] [place] [address] ENT Allergy [facility] Schedule an appointment as soon as possible for a visit  For re-evaluation as soon as possible regarding tracheostomy [address]  [facility] [address] [facility] [address]  [address] [facility] [address], [address] [place], [place], [address], [phone]        Patient's Medications   Discharge Prescriptions    No medications on file     No discharge procedures on file         ED Provider  Electronically Signed by     Oriana Coronel MD  05/19/22 2132       Oriana Coronel MD  05/19/22 2831

## 2022-05-20 NOTE — ASSESSMENT & PLAN NOTE
· Patient with mild symptoms, and currently at baseline oxygen requirement   However patient is high risk therefore while hospitalized will treat with Solu-Medrol and remdesivir  · , Trop 20 --> 24, CK pending, CRP pending  · Check D-dimer  · Monitor respiratory status notify provider for increasing oxygen requirements  · Respiratory protocol

## 2022-05-20 NOTE — PROGRESS NOTES
[facility]  Progress Note - Matias Barone 1965, 55 y o  female MRN: [id number]  Unit/Bed#: -01 Encounter: [id number]  Primary Care Provider: Myranda Mccallum MD   Date and time admitted to hospital: 5/19/2022  6:03 PM    Acute on chronic combined systolic and diastolic heart failure ([address] )  Assessment & Plan  Wt Readings from Last 3 Encounters:   05/20/22 89 2 kg (196 lb 10 4 oz)   05/11/22 86 2 kg (190 lb)   03/28/22 86 kg (189 lb 9 5 oz)     ·  SOB and orthopnea-improving  · Echo 04/2022:  LVEF 50%, moderate hypokinesis of inferior and basal anterolateral walls, moderate concentric hypertrophy  · CT revealing pulmonary edema and severe cardiomegaly, BNP elevated  ·  continue Lasix BID dosing  · Continue BB  · I&Os, daily weights, fluid restriction        * Shortness of breath  Assessment & Plan  · Appears multifactorial due to mild CHF exacerbation, COVID-19 infection, anxiety  · Patient also with chronic anemia, Hgb within baseline   · Afebrile, no leukocytosis, does not meet SIRS sepsis criteria  · CT chest/abdomen/pelvis:  Pulmonary edema likely secondary CHF  Severe cardiomegaly  Unchanged 42 mm ascending aortic aneurysm  · Currently at baseline oxygen requirements  · See treatment plan as presented below    Prolonged Q-T interval on ECG  Assessment & Plan  ·   · Hold Lexapro and trazodone  · Repeat EKG    COVID-19  Assessment & Plan  · Patient with mild symptoms, and currently at baseline oxygen requirement   However patient is high risk therefore while hospitalized will treat with Solu-Medrol and remdesivir  · , Trop 20 --> 24, CK-normal, CRP-elevated  ·  D-dimer-no significant elevation  · Monitor respiratory status notify provider for increasing oxygen requirements  · Respiratory protocol  · May discontinue steroids and remdesivir upon discharge    Chronic hypoxemic respiratory failure (HCC)  Assessment & Plan  · Chronically on 10 L trach mask  · Continue tracheostomy care  · No increased requirement of oxygen    Hypokalemia  Assessment & Plan  Supplemented, recheck in a m  Marco An Type 2 diabetes mellitus treated with insulin [facility])  Assessment & Plan  Lab Results   Component Value Date    HGBA1C 6 6 (H) 2022       Recent Labs     22  0010 22  0209 22  0442 22  0731   POCGLU >500* 402* 215* 116       Blood Sugar Average: Last 72 hrs:  · (P) 101 [id number] home regimen:  Lantus 8 units HS, Humalog coverage 3 units t i d  With meals  · Continue  · Add SSI Accu-Cheks a c  HS  · Hyperglycemic on admission, reports he is not appear in DKA  Will give additional Humulin coverage now  Hyperglycemia improving        VTE Pharmacologic Prophylaxis:   Pharmacologic: Enoxaparin (Lovenox)  Mechanical VTE Prophylaxis in Place: Yes    Patient Centered Rounds: I have performed bedside rounds with nursing staff today  Discussions with Specialists or Other Care Team Provider:  None    Education and Discussions with Family / Patient:  Patient, offered to discuss with family, declined    Time Spent for Care:  45 min  More than 50% of total time spent on counseling and coordination of care as described above  Current Length of Stay: 0 day(s)    Current Patient Status: Observation   Certification Statement: The patient will continue to require additional inpatient hospital stay due to CHF    Discharge Plan:  24-48 hours    Code Status: Level 1 - Full Code      Subjective:   Patient still complains of shortness of breath, denies any chest pain  Complains of intermittent cough  Denies any fever, chills  Denies any recent sick contacts  Vaccinated for COVID-19 including booster      Objective:     Vitals:   Temp (24hrs), Av 1 °F (36 7 °C), Min:97 5 °F (36 4 °C), Max:98 8 °F (37 1 °C)    Temp:  [97 5 °F (36 4 °C)-98 8 °F (37 1 °C)] 97 5 °F (36 4 °C)  HR:  [55-79] 55  Resp:  [18-20] 20  BP: (115-166)/(72-94) 157/72  SpO2:  [99 %-100 %] 100 %  Body mass index is 29 04 kg/m²  Input and Output Summary (last 24 hours): Intake/Output Summary (Last 24 hours) at 5/20/2022 1105  Last data filed at 5/20/2022 0715  Gross per 24 hour   Intake --   Output 600 ml   Net -600 ml       Physical Exam:     Physical Exam  General appearance:  Patient not in acute distress  Eyes:  Pupils equal reacting to light   Neck:  Trach present  ENT:  Moist oral mucous membranes  CVS:  S1-S2 heard, regular rate and rhythm, no pedal edema  Chest:  Bilateral air entry present, decreased air entry at the bases  Abdomen:  Soft, nontender, bowel sounds present  CNS:  No focal neurological deficits  Genitourinary: deferred  Skin:  No acute rash   psychiatric:  No psychosis  Musculoskeletal:  No joint deformities     Additional Data:     Labs:    Results from last 7 days   Lab Units 05/20/22  0449   WBC Thousand/uL 8 13   HEMOGLOBIN g/dL 9 5*   HEMATOCRIT % 31 2*   PLATELETS Thousands/uL 395*   NEUTROS PCT % 64   LYMPHS PCT % 25   MONOS PCT % 9   EOS PCT % 1     Results from last 7 days   Lab Units 05/19/22  1848   SODIUM mmol/L 134*   POTASSIUM mmol/L 3 2*   CHLORIDE mmol/L 100   CO2 mmol/L 26   BUN mg/dL 15   CREATININE mg/dL 0 84   ANION GAP mmol/L 8   CALCIUM mg/dL 8 3*   ALBUMIN g/dL 3 3*   TOTAL BILIRUBIN mg/dL 0 53   ALK PHOS U/L 94   ALT U/L 7   AST U/L 7*   GLUCOSE RANDOM mg/dL 474*         Results from last 7 days   Lab Units 05/20/22  0731 05/20/22  0442 05/20/22  0209 05/20/22  0010   POC GLUCOSE mg/dl 116 215* 402* >500*         Results from last 7 days   Lab Units 05/19/22  1848   PROCALCITONIN ng/ml 0 09           * I Have Reviewed All Lab Data Listed Above  * Additional Pertinent Lab Tests Reviewed: [address] Admission Reviewed    Imaging:    CT chest images reviewed personally pulmonary edema, no consolidation      Recent Cultures (last 7 days):           Last 24 Hours Medication List:   Current Facility-Administered Medications   Medication Dose Route Frequency Provider Last Rate    amiodarone  100 mg Oral Daily With Breakfast Leda Hyla Punch, PA-C      apixaban  5 mg Oral BID Leda Hyla Punch, PA-C      atorvastatin  40 mg Oral Before Dinner Leda Hyla Punch, PA-C      famotidine  20 mg Oral BID Ange Pelletier, PA-C      furosemide  40 mg Intravenous BID Ange Manning, PA-C      guaiFENesin  600 mg Oral Q12H [address] Leda Hyla Punch, PA-C      insulin glargine  8 Units Subcutaneous HS Leda Hyla Punch, PA-C      insulin lispro  1-6 Units Subcutaneous TID AC Leda Hyla Punch, PA-C      insulin lispro  1-6 Units Subcutaneous HS Leda Hyla Punch, PA-C      insulin lispro  1-6 Units Subcutaneous 0200 Leda Hyla Punch, PA-C      insulin lispro  3 Units Subcutaneous Daily With Breakfast Leda Hyla Punch, PA-C      insulin lispro  3 Units Subcutaneous Daily With Lunch Leda Hyla Punch, PA-C      insulin lispro  3 Units Subcutaneous Daily With Dinner Leda Hyla Punch, PA-C      ipratropium  0 5 mg Nebulization TID Ange Pelletier, PA-C      levalbuterol  1 25 mg Nebulization TID Ange Pelletier, PA-C      LORazepam  0 5 mg Oral BID PRN Ange Pelletier, PA-C      losartan  50 mg Oral Daily Leda Hyla Punch, PA-C      melatonin  6 mg Oral HS Ledagisselle Karimi Punch, PA-C      [START ON 5/21/2022] methylPREDNISolone sodium succinate  40 mg Intravenous Q24H Leda Hyla Punch, PA-C      metoprolol tartrate  25 mg Oral Q12H [address] [facility], PA-C      polyethylene glycol  17 g Oral Daily Leda Hyla Punch, PA-C      pregabalin  75 mg Oral Daily Leda Hyla Punch, PA-C      [START ON 5/21/2022] remdesivir  100 mg Intravenous Q24H Leda Hyla Punch, PA-C      spironolactone  100 mg Oral Daily Leda Hyla Punch, PA-C      ticagrelor  90 mg Oral Q12H [address] Leda Alea Walls PA-C          Today, Patient Was Seen By: Aristides Spain MD    ** Please Note: Dictation voice to text software may have been used in the creation of this document   **

## 2022-05-20 NOTE — ASSESSMENT & PLAN NOTE
· Patient with mild symptoms, and currently at baseline oxygen requirement   However patient is high risk therefore while hospitalized will treat with Solu-Medrol and remdesivir  · , Trop 20 --> 24, CK-normal, CRP-elevated  ·  D-dimer-no significant elevation  · Monitor respiratory status notify provider for increasing oxygen requirements  · Respiratory protocol  · May discontinue steroids and remdesivir upon discharge

## 2022-05-20 NOTE — ASSESSMENT & PLAN NOTE
· Appears multifactorial due to mild CHF exacerbation, COVID-19 infection and mucus plugging of the trach collar (Was seen in ED on 05/11 and had trach collar replaced)  · Patient also with chronic anemia, Hgb within baseline   · Afebrile, no leukocytosis, does not meet SIRS sepsis criteria  · CT chest/abdomen/pelvis:  Pulmonary edema likely secondary CHF  Severe cardiomegaly  Unchanged 42 mm ascending aortic aneurysm     · Currently at baseline oxygen requirements  · See treatment plan as presented below

## 2022-05-20 NOTE — ASSESSMENT & PLAN NOTE
· Chronically on 10 L trach mask  · Continue tracheostomy care  · No increased requirement of oxygen

## 2022-05-20 NOTE — PLAN OF CARE
Problem: Potential for Falls  Goal: Patient will remain free of falls  Description: INTERVENTIONS:  - Educate patient/family on patient safety including physical limitations  - Instruct patient to call for assistance with activity   - Consult OT/PT to assist with strengthening/mobility   - Keep Call bell within reach  - Keep bed low and locked with side rails adjusted as appropriate  - Keep care items and personal belongings within reach  - Initiate and maintain comfort rounds  - Offer Toileting every 2  Hours, in advance of need  - Initiate/Maintain  bed and chair alarm  - Consider moving patient to room near nurses station  Outcome: Progressing     Problem: PAIN - ADULT  Goal: Verbalizes/displays adequate comfort level or baseline comfort level  Description: Interventions:  - Encourage patient to monitor pain and request assistance  - Assess pain using appropriate pain scale  - Administer analgesics based on type and severity of pain and evaluate response  - Implement non-pharmacological measures as appropriate and evaluate response  - Consider cultural and social influences on pain and pain management  - Notify physician/advanced practitioner if interventions unsuccessful or patient reports new pain  Outcome: Progressing     Problem: INFECTION - ADULT  Goal: Absence or prevention of progression during hospitalization  Description: INTERVENTIONS:  - Assess and monitor for signs and symptoms of infection  - Monitor lab/diagnostic results  - Administer medications as ordered  - Instruct and encourage patient and family to use good hand hygiene technique  - Identify and instruct in appropriate isolation precautions for identified infection/condition  Outcome: Progressing  Goal: Absence of fever/infection during neutropenic period  Description: INTERVENTIONS:  - Monitor WBC    Outcome: Progressing     Problem: SAFETY ADULT  Goal: Patient will remain free of falls  Description: INTERVENTIONS:  - Educate patient/family on patient safety including physical limitations  - Instruct patient to call for assistance with activity   - Consult OT/PT to assist with strengthening/mobility   - Keep Call bell within reach  - Keep bed low and locked with side rails adjusted as appropriate  - Keep care items and personal belongings within reach  - Initiate and maintain comfort rounds  - Offer Toileting every 2  Hours, in advance of need  - Initiate/Maintain  bed and chair alarm  - Consider moving patient to room near nurses station  Outcome: Progressing  Goal: Maintain or return to baseline ADL function  Description: INTERVENTIONS:  -  Assess patient's ability to carry out ADLs; assess patient's baseline for ADL function and identify physical deficits which impact ability to perform ADLs (bathing, care of mouth/teeth, toileting, grooming, dressing, etc )  - Assess/evaluate cause of self-care deficits   - Assess range of motion  - Assess patient's mobility; develop plan if impaired  - Assess patient's need for assistive devices and provide as appropriate  - Encourage maximum independence but intervene and supervise when necessary  - Involve family in performance of ADLs  - Assess for home care needs following discharge   - Consider OT consult to assist with ADL evaluation and planning for discharge  - Provide patient education as appropriate  Outcome: Progressing  Goal: Maintains/Returns to pre admission functional level  Description: INTERVENTIONS:  - Perform BMAT or MOVE assessment daily    - Set and communicate daily mobility goal to care team and patient/family/caregiver  - Collaborate with rehabilitation services on mobility goals if consulted  - Perform Range of Motion 3  times a day    - Stand patient 3 times a day  - Ambulate patient  3 times a day  - Out of bed to chair 3 times a day   - Out of bed for meals  3 times a day  - Out of bed for toileting  - Record patient progress and toleration of activity level   Outcome: Progressing     Problem: DISCHARGE PLANNING  Goal: Discharge to home or other facility with appropriate resources  Description: INTERVENTIONS:  - Identify barriers to discharge w/patient and caregiver  - Arrange for needed discharge resources and transportation as appropriate  - Identify discharge learning needs (meds, wound care, etc )  - Arrange for interpretive services to assist at discharge as needed  - Refer to Case Management Department for coordinating discharge planning if the patient needs post-hospital services based on physician/advanced practitioner order or complex needs related to functional status, cognitive ability, or social support system  Outcome: Progressing     Problem: Knowledge Deficit  Goal: Patient/family/caregiver demonstrates understanding of disease process, treatment plan, medications, and discharge instructions  Description: Complete learning assessment and assess knowledge base    Interventions:  - Provide teaching at level of understanding  - Provide teaching via preferred learning methods  Outcome: Progressing     Problem: RESPIRATORY - ADULT  Goal: Achieves optimal ventilation and oxygenation  Description: INTERVENTIONS:  - Assess for changes in respiratory status  - Assess for changes in mentation and behavior  - Position to facilitate oxygenation and minimize respiratory effort  - Oxygen administered by appropriate delivery if ordered  - Initiate smoking cessation education as indicated  - Encourage broncho-pulmonary hygiene including cough, deep breathe, Incentive Spirometry  - Assess the need for suctioning and aspirate as needed  - Assess and instruct to report SOB or any respiratory difficulty  - Respiratory Therapy support as indicated  Outcome: Progressing

## 2022-05-20 NOTE — ASSESSMENT & PLAN NOTE
Lab Results   Component Value Date    HGBA1C 6 6 (H) 01/01/2022       Recent Labs     05/20/22  0010   POCGLU >500*       Blood Sugar Average: Last 72 hrs:  ·  home regimen:  Lantus 8 units HS, Humalog coverage 3 units t i d  With meals  · Continue  · Add SSI Accu-Cheks a c  HS  · Hyperglycemic on admission, reports he is not appear in DKA  Will give additional Humulin coverage now  · Recheck at 2:00 a m

## 2022-05-20 NOTE — DISCHARGE INSTRUCTIONS
You have been referred to the COVID antibody clinic  They will call you if you are a candidate for this treatment  Come back to the emergency department if you have new or worsening symptoms such as increasing throat pain, tracheostomy site pain, shortness of breath

## 2022-05-20 NOTE — ASSESSMENT & PLAN NOTE
Lab Results   Component Value Date    HGBA1C 6 6 (H) 01/01/2022       Recent Labs     05/20/22  0010 05/20/22  0209 05/20/22  0442 05/20/22  0731   POCGLU >500* 402* 215* 116       Blood Sugar Average: Last 72 hrs:  · (P) 730 [id number] home regimen:  Lantus 8 units HS, Humalog coverage 3 units t i d  With meals  · Continue  · Add SSI Accu-Cheks a c  HS  · Hyperglycemic on admission, reports he is not appear in DKA    Will give additional Humulin coverage now  Hyperglycemia improving

## 2022-05-21 LAB
ANION GAP SERPL CALCULATED.3IONS-SCNC: 10 MMOL/L (ref 4–13)
ANION GAP SERPL CALCULATED.3IONS-SCNC: 10 MMOL/L (ref 4–13)
BASOPHILS # BLD AUTO: 0.02 THOUSANDS/ΜL (ref 0–0.1)
BASOPHILS NFR BLD AUTO: 0 % (ref 0–1)
BUN SERPL-MCNC: 17 MG/DL (ref 5–25)
BUN SERPL-MCNC: 19 MG/DL (ref 5–25)
CALCIUM SERPL-MCNC: 7.8 MG/DL (ref 8.4–10.2)
CALCIUM SERPL-MCNC: 8.8 MG/DL (ref 8.4–10.2)
CHLORIDE SERPL-SCNC: 104 MMOL/L (ref 96–108)
CHLORIDE SERPL-SCNC: 96 MMOL/L (ref 96–108)
CO2 SERPL-SCNC: 26 MMOL/L (ref 21–32)
CO2 SERPL-SCNC: 27 MMOL/L (ref 21–32)
CREAT SERPL-MCNC: 0.76 MG/DL (ref 0.6–1.3)
CREAT SERPL-MCNC: 1.03 MG/DL (ref 0.6–1.3)
EOSINOPHIL # BLD AUTO: 0.11 THOUSAND/ΜL (ref 0–0.61)
EOSINOPHIL NFR BLD AUTO: 1 % (ref 0–6)
ERYTHROCYTE [DISTWIDTH] IN BLOOD BY AUTOMATED COUNT: 17.1 % (ref 11.6–15.1)
GFR SERPL CREATININE-BSD FRML MDRD: 61 ML/MIN/1.73SQ M
GFR SERPL CREATININE-BSD FRML MDRD: 88 ML/MIN/1.73SQ M
GLUCOSE SERPL-MCNC: 204 MG/DL (ref 65–140)
GLUCOSE SERPL-MCNC: 225 MG/DL (ref 65–140)
GLUCOSE SERPL-MCNC: 370 MG/DL (ref 65–140)
GLUCOSE SERPL-MCNC: 384 MG/DL (ref 65–140)
GLUCOSE SERPL-MCNC: 421 MG/DL (ref 65–140)
GLUCOSE SERPL-MCNC: 531 MG/DL (ref 65–140)
GLUCOSE SERPL-MCNC: >500 MG/DL (ref 65–140)
HCT VFR BLD AUTO: 30.2 % (ref 34.8–46.1)
HGB BLD-MCNC: 9.1 G/DL (ref 11.5–15.4)
IMM GRANULOCYTES # BLD AUTO: 0.05 THOUSAND/UL (ref 0–0.2)
IMM GRANULOCYTES NFR BLD AUTO: 1 % (ref 0–2)
LYMPHOCYTES # BLD AUTO: 1.84 THOUSANDS/ΜL (ref 0.6–4.47)
LYMPHOCYTES NFR BLD AUTO: 20 % (ref 14–44)
MAGNESIUM SERPL-MCNC: 1.6 MG/DL (ref 1.9–2.7)
MCH RBC QN AUTO: 24.4 PG (ref 26.8–34.3)
MCHC RBC AUTO-ENTMCNC: 30.1 G/DL (ref 31.4–37.4)
MCV RBC AUTO: 81 FL (ref 82–98)
MONOCYTES # BLD AUTO: 0.6 THOUSAND/ΜL (ref 0.17–1.22)
MONOCYTES NFR BLD AUTO: 7 % (ref 4–12)
NEUTROPHILS # BLD AUTO: 6.41 THOUSANDS/ΜL (ref 1.85–7.62)
NEUTS SEG NFR BLD AUTO: 71 % (ref 43–75)
NRBC BLD AUTO-RTO: 0 /100 WBCS
PLATELET # BLD AUTO: 400 THOUSANDS/UL (ref 149–390)
PMV BLD AUTO: 11.3 FL (ref 8.9–12.7)
POTASSIUM SERPL-SCNC: 3.3 MMOL/L (ref 3.5–5.3)
POTASSIUM SERPL-SCNC: 4 MMOL/L (ref 3.5–5.3)
RBC # BLD AUTO: 3.73 MILLION/UL (ref 3.81–5.12)
SODIUM SERPL-SCNC: 132 MMOL/L (ref 135–147)
SODIUM SERPL-SCNC: 141 MMOL/L (ref 135–147)
WBC # BLD AUTO: 9.03 THOUSAND/UL (ref 4.31–10.16)

## 2022-05-21 PROCEDURE — 93005 ELECTROCARDIOGRAM TRACING: CPT

## 2022-05-21 PROCEDURE — 85025 COMPLETE CBC W/AUTO DIFF WBC: CPT | Performed by: INTERNAL MEDICINE

## 2022-05-21 PROCEDURE — 80048 BASIC METABOLIC PNL TOTAL CA: CPT

## 2022-05-21 PROCEDURE — 94760 N-INVAS EAR/PLS OXIMETRY 1: CPT

## 2022-05-21 PROCEDURE — 99232 SBSQ HOSP IP/OBS MODERATE 35: CPT | Performed by: PHYSICIAN ASSISTANT

## 2022-05-21 PROCEDURE — 94640 AIRWAY INHALATION TREATMENT: CPT

## 2022-05-21 PROCEDURE — 82948 REAGENT STRIP/BLOOD GLUCOSE: CPT

## 2022-05-21 PROCEDURE — 94762 N-INVAS EAR/PLS OXIMTRY CONT: CPT

## 2022-05-21 PROCEDURE — 83735 ASSAY OF MAGNESIUM: CPT | Performed by: PHYSICIAN ASSISTANT

## 2022-05-21 RX ORDER — INSULIN LISPRO 100 [IU]/ML
7 INJECTION, SOLUTION INTRAVENOUS; SUBCUTANEOUS
Status: DISCONTINUED | OUTPATIENT
Start: 2022-05-21 | End: 2022-05-22

## 2022-05-21 RX ORDER — POTASSIUM CHLORIDE 20 MEQ/1
40 TABLET, EXTENDED RELEASE ORAL ONCE
Status: DISCONTINUED | OUTPATIENT
Start: 2022-05-21 | End: 2022-05-21

## 2022-05-21 RX ORDER — POTASSIUM CHLORIDE 20MEQ/15ML
40 LIQUID (ML) ORAL ONCE
Status: COMPLETED | OUTPATIENT
Start: 2022-05-21 | End: 2022-05-21

## 2022-05-21 RX ORDER — INSULIN GLARGINE 100 [IU]/ML
12 INJECTION, SOLUTION SUBCUTANEOUS
Status: DISCONTINUED | OUTPATIENT
Start: 2022-05-21 | End: 2022-05-23 | Stop reason: HOSPADM

## 2022-05-21 RX ORDER — MAGNESIUM SULFATE HEPTAHYDRATE 40 MG/ML
2 INJECTION, SOLUTION INTRAVENOUS ONCE
Status: COMPLETED | OUTPATIENT
Start: 2022-05-21 | End: 2022-05-21

## 2022-05-21 RX ADMIN — LEVALBUTEROL HYDROCHLORIDE 1.25 MG: 1.25 SOLUTION RESPIRATORY (INHALATION) at 14:58

## 2022-05-21 RX ADMIN — LEVALBUTEROL HYDROCHLORIDE 1.25 MG: 1.25 SOLUTION RESPIRATORY (INHALATION) at 09:00

## 2022-05-21 RX ADMIN — INSULIN LISPRO 3 UNITS: 100 INJECTION, SOLUTION INTRAVENOUS; SUBCUTANEOUS at 08:37

## 2022-05-21 RX ADMIN — FUROSEMIDE 40 MG: 10 INJECTION, SOLUTION INTRAMUSCULAR; INTRAVENOUS at 08:35

## 2022-05-21 RX ADMIN — INSULIN LISPRO 6 UNITS: 100 INJECTION, SOLUTION INTRAVENOUS; SUBCUTANEOUS at 01:36

## 2022-05-21 RX ADMIN — APIXABAN 5 MG: 5 TABLET, FILM COATED ORAL at 17:18

## 2022-05-21 RX ADMIN — LEVALBUTEROL HYDROCHLORIDE 1.25 MG: 1.25 SOLUTION RESPIRATORY (INHALATION) at 21:07

## 2022-05-21 RX ADMIN — FAMOTIDINE 20 MG: 40 POWDER, FOR SUSPENSION ORAL at 09:30

## 2022-05-21 RX ADMIN — METOPROLOL TARTRATE 25 MG: 25 TABLET, FILM COATED ORAL at 08:35

## 2022-05-21 RX ADMIN — INSULIN LISPRO 2 UNITS: 100 INJECTION, SOLUTION INTRAVENOUS; SUBCUTANEOUS at 08:36

## 2022-05-21 RX ADMIN — INSULIN LISPRO 6 UNITS: 100 INJECTION, SOLUTION INTRAVENOUS; SUBCUTANEOUS at 16:31

## 2022-05-21 RX ADMIN — LORAZEPAM 0.5 MG: 0.5 TABLET ORAL at 09:30

## 2022-05-21 RX ADMIN — APIXABAN 5 MG: 5 TABLET, FILM COATED ORAL at 08:35

## 2022-05-21 RX ADMIN — PREGABALIN 75 MG: 75 CAPSULE ORAL at 08:35

## 2022-05-21 RX ADMIN — INSULIN LISPRO 7 UNITS: 100 INJECTION, SOLUTION INTRAVENOUS; SUBCUTANEOUS at 16:31

## 2022-05-21 RX ADMIN — BENZONATATE 100 MG: 100 CAPSULE ORAL at 20:14

## 2022-05-21 RX ADMIN — IPRATROPIUM BROMIDE 0.5 MG: 0.5 SOLUTION RESPIRATORY (INHALATION) at 14:58

## 2022-05-21 RX ADMIN — FAMOTIDINE 20 MG: 40 POWDER, FOR SUSPENSION ORAL at 17:18

## 2022-05-21 RX ADMIN — IPRATROPIUM BROMIDE 0.5 MG: 0.5 SOLUTION RESPIRATORY (INHALATION) at 21:07

## 2022-05-21 RX ADMIN — SPIRONOLACTONE 100 MG: 25 TABLET ORAL at 08:35

## 2022-05-21 RX ADMIN — INSULIN HUMAN 10 UNITS: 100 INJECTION, SOLUTION PARENTERAL at 22:02

## 2022-05-21 RX ADMIN — IPRATROPIUM BROMIDE 0.5 MG: 0.5 SOLUTION RESPIRATORY (INHALATION) at 09:00

## 2022-05-21 RX ADMIN — Medication 6 MG: at 22:14

## 2022-05-21 RX ADMIN — FUROSEMIDE 40 MG: 10 INJECTION, SOLUTION INTRAMUSCULAR; INTRAVENOUS at 17:18

## 2022-05-21 RX ADMIN — INSULIN GLARGINE 12 UNITS: 100 INJECTION, SOLUTION SUBCUTANEOUS at 22:02

## 2022-05-21 RX ADMIN — POTASSIUM CHLORIDE 40 MEQ: 20 SOLUTION ORAL at 12:36

## 2022-05-21 RX ADMIN — AMIODARONE HYDROCHLORIDE 100 MG: 200 TABLET ORAL at 08:35

## 2022-05-21 RX ADMIN — METOPROLOL TARTRATE 25 MG: 25 TABLET, FILM COATED ORAL at 20:14

## 2022-05-21 RX ADMIN — TICAGRELOR 90 MG: 90 TABLET ORAL at 08:34

## 2022-05-21 RX ADMIN — METHYLPREDNISOLONE SODIUM SUCCINATE 40 MG: 40 INJECTION, POWDER, FOR SOLUTION INTRAMUSCULAR; INTRAVENOUS at 08:43

## 2022-05-21 RX ADMIN — GUAIFENESIN 600 MG: 600 TABLET ORAL at 08:35

## 2022-05-21 RX ADMIN — LOSARTAN POTASSIUM 50 MG: 50 TABLET, FILM COATED ORAL at 08:35

## 2022-05-21 RX ADMIN — INSULIN LISPRO 3 UNITS: 100 INJECTION, SOLUTION INTRAVENOUS; SUBCUTANEOUS at 12:47

## 2022-05-21 RX ADMIN — INSULIN LISPRO 6 UNITS: 100 INJECTION, SOLUTION INTRAVENOUS; SUBCUTANEOUS at 12:36

## 2022-05-21 RX ADMIN — ATORVASTATIN CALCIUM 40 MG: 40 TABLET, FILM COATED ORAL at 16:31

## 2022-05-21 RX ADMIN — REMDESIVIR 100 MG: 100 INJECTION, POWDER, LYOPHILIZED, FOR SOLUTION INTRAVENOUS at 00:10

## 2022-05-21 RX ADMIN — TICAGRELOR 90 MG: 90 TABLET ORAL at 20:14

## 2022-05-21 RX ADMIN — MAGNESIUM SULFATE HEPTAHYDRATE 2 G: 40 INJECTION, SOLUTION INTRAVENOUS at 12:35

## 2022-05-21 RX ADMIN — GUAIFENESIN 600 MG: 600 TABLET ORAL at 20:14

## 2022-05-21 RX ADMIN — LORAZEPAM 0.5 MG: 0.5 TABLET ORAL at 16:31

## 2022-05-21 NOTE — PLAN OF CARE
Problem: Potential for Falls  Goal: Patient will remain free of falls  Description: INTERVENTIONS:  - Educate patient/family on patient safety including physical limitations  - Instruct patient to call for assistance with activity   - Consult OT/PT to assist with strengthening/mobility   - Keep Call bell within reach  - Keep bed low and locked with side rails adjusted as appropriate  - Keep care items and personal belongings within reach  - Initiate and maintain comfort rounds  - Make Fall Risk Sign visible to staff  - Apply yellow socks and bracelet for high fall risk patients  - Consider moving patient to room near nurses station  Outcome: Progressing     Problem: PAIN - ADULT  Goal: Verbalizes/displays adequate comfort level or baseline comfort level  Description: Interventions:  - Encourage patient to monitor pain and request assistance  - Assess pain using appropriate pain scale  - Administer analgesics based on type and severity of pain and evaluate response  - Implement non-pharmacological measures as appropriate and evaluate response  - Consider cultural and social influences on pain and pain management  - Notify physician/advanced practitioner if interventions unsuccessful or patient reports new pain  Outcome: Progressing     Problem: INFECTION - ADULT  Goal: Absence or prevention of progression during hospitalization  Description: INTERVENTIONS:  - Assess and monitor for signs and symptoms of infection  - Monitor lab/diagnostic results  - Monitor all insertion sites, i e  I peripheral IV trach  - Monitor endotracheal if appropriate and nasal secretions for changes in amount and color  - [place] appropriate cooling/warming therapies per order  - Administer medications as ordered  - Instruct and encourage patient and family to use good hand hygiene technique  - Identify and instruct in appropriate isolation precautions for identified infection/condition  Outcome: Progressing  Goal: Absence of fever/infection during neutropenic period  Description: INTERVENTIONS:  - Monitor WBC    Outcome: Progressing     Problem: SAFETY ADULT  Goal: Patient will remain free of falls  Description: INTERVENTIONS:  - Educate patient/family on patient safety including physical limitations  - Instruct patient to call for assistance with activity   - Consult OT/PT to assist with strengthening/mobility   - Keep Call bell within reach  - Keep bed low and locked with side rails adjusted as appropriate  - Keep care items and personal belongings within reach  - Initiate and maintain comfort rounds  - Make Fall Risk Sign visible to staff  - Apply yellow socks and bracelet for high fall risk patients  - Consider moving patient to room near nurses station  Outcome: Progressing  Goal: Maintain or return to baseline ADL function  Description: INTERVENTIONS:  - Educate patient/family on patient safety including physical limitations  - Instruct patient to call for assistance with activity   - Consult OT/PT to assist with strengthening/mobility   - Keep Call bell within reach  - Keep bed low and locked with side rails adjusted as appropriate  - Keep care items and personal belongings within reach  - Initiate and maintain comfort rounds  - Make Fall Risk Sign visible to staff  - Apply yellow socks and bracelet for high fall risk patients  - Consider moving patient to room near nurses station  Outcome: Progressing  Goal: Maintains/Returns to pre admission functional level  Description: INTERVENTIONS:  - Perform BMAT or MOVE assessment daily    - Set and communicate daily mobility goal to care team and patient/family/caregiver     - Collaborate with rehabilitation services on mobility goals if consulted    - Out of bed for toileting  - Record patient progress and toleration of activity level   Outcome: Progressing     Problem: DISCHARGE PLANNING  Goal: Discharge to home or other facility with appropriate resources  Description: INTERVENTIONS:  - Identify barriers to discharge w/patient and caregiver  - Arrange for needed discharge resources and transportation as appropriate  - Identify discharge learning needs (meds, wound care, etc )  - Arrange for interpretive services to assist at discharge as needed  - Refer to Case Management Department for coordinating discharge planning if the patient needs post-hospital services based on physician/advanced practitioner order or complex needs related to functional status, cognitive ability, or social support system  Outcome: Progressing     Problem: Knowledge Deficit  Goal: Patient/family/caregiver demonstrates understanding of disease process, treatment plan, medications, and discharge instructions  Description: Complete learning assessment and assess knowledge base    Interventions:  - Provide teaching at level of understanding  - Provide teaching via preferred learning methods  Outcome: Progressing     Problem: RESPIRATORY - ADULT  Goal: Achieves optimal ventilation and oxygenation  Description: INTERVENTIONS:  - Assess for changes in respiratory status  - Assess for changes in mentation and behavior  - Position to facilitate oxygenation and minimize respiratory effort  - Oxygen administered by appropriate delivery if ordered  - Initiate smoking cessation education as indicated  - Encourage broncho-pulmonary hygiene including cough, deep breathe, Incentive Spirometry  - Assess the need for suctioning and aspirate as needed  - Assess and instruct to report SOB or any respiratory difficulty  - Respiratory Therapy support as indicated  Outcome: Progressing

## 2022-05-21 NOTE — ASSESSMENT & PLAN NOTE
Wt Readings from Last 3 Encounters:   05/21/22 86 9 kg (191 lb 9 3 oz)   05/11/22 86 2 kg (190 lb)   03/28/22 86 kg (189 lb 9 5 oz)     · Patient presented with SOB and orthopnea  · Home regimen: Lasix 40 mg daily, Lopressor 25 mg b i d , Brilinta 90 mg b i d   · Echo 04/2022:  LVEF 50%, moderate hypokinesis of inferior and basal anterolateral walls, moderate concentric hypertrophy  · CT revealing pulmonary edema and severe cardiomegaly, BNP elevated  · Patient today still with SOB, diffuse rales on auscultation of lungs  · C/w IV Lasix 40mg BID, patient requires further diuresis  · Continue home Lopressor, Brilinta  · I&Os, daily weights, fluid restriction

## 2022-05-21 NOTE — PROGRESS NOTES
[facility]  Progress Note - Irasema Wiley 1965, 55 y o  female MRN: [id number]  Unit/Bed#: -01 Encounter: [id number]  Primary Care Provider: Beverly March MD   Date and time admitted to hospital: 5/19/2022  6:03 PM    * Shortness of breath  Assessment & Plan  · Suspect to be multifactorial due to mild CHF exacerbation, COVID-19 infection, anxiety  · Patient also with chronic anemia, Hgb within baseline   · Afebrile, no leukocytosis, does not meet SIRs/sepsis criteria  · CT chest/abdomen/pelvis: Pulmonary edema likely secondary CHF  Severe cardiomegaly  Unchanged 42 mm ascending aortic aneurysm  · Currently at baseline oxygen requirements  · See treatment plan as presented below    Acute on chronic combined systolic and diastolic heart failure (HCC)  Assessment & Plan  Wt Readings from Last 3 Encounters:   05/21/22 86 9 kg (191 lb 9 3 oz)   05/11/22 86 2 kg (190 lb)   03/28/22 86 kg (189 lb 9 5 oz)     · Patient presented with SOB and orthopnea  · Home regimen: Lasix 40 mg daily, Lopressor 25 mg b i d , Brilinta 90 mg b i d   · Echo 04/2022:  LVEF 50%, moderate hypokinesis of inferior and basal anterolateral walls, moderate concentric hypertrophy  · CT revealing pulmonary edema and severe cardiomegaly, BNP elevated  · Patient today still with SOB, diffuse rales on auscultation of lungs  · C/w IV Lasix 40mg BID, patient requires further diuresis  · Continue home Lopressor, Brilinta  · I&Os, daily weights, fluid restriction    COVID-19  Assessment & Plan  · Patient with mild symptoms, and currently at baseline oxygen requirement   However patient is high risk given presence of tracheostomy, CHF and chronic respiratory failure, will treat per protocol  · COVID pathway:  · C/w Remdesivir (Day 2/5), Dexamethasome (Day 2/10)  · Inflammatory markers:  · BNP: 454  · Trop: 20->24->23, CK:37, CRP: 31 7, Procal: 0 09   · D-dimer: 0 67  · AC: C/w home Eliquis 5mg PO BID  · Monitor respiratory status notify provider for increasing oxygen requirements  · Respiratory protocol  · May discontinue steroids and remdesivir upon discharge    Type 2 diabetes mellitus treated with insulin [facility])  Assessment & Plan  Lab Results   Component Value Date    HGBA1C 6 6 (H) 01/01/2022       Recent Labs     05/20/22  2052 05/21/22  0134 05/21/22  0737 05/21/22  1231   POCGLU 434* 370* 225* 384*       Blood Sugar Average: Last 72 hrs:  · (P) 999 [id number]   · Home regimen: Lantus 8 units HS, Humalog 3 units TID with meals  · Hyperglycemia POA, does not appear to be in DKA  · Patient remains hyperglycemic with -300s, likely steroid induced  Total insulin last 24 hrs: 41 units  · Will increase Lantus to 12 units q h s , Humalog to 7 units TID with meals, continue SSI coverage with Accu-Cheks ACHS  · Hypoglycemia protocol    Prolonged Q-T interval on ECG  Assessment & Plan  ·  an admission  · Lexapro and trazodone held  · Repeat EKG today, reassess QTc    Chronic hypoxemic respiratory failure (HCC)  Assessment & Plan  · Chronically on 10 L trach mask  · Continue tracheostomy care  · No increased requirement of oxygen    Hypokalemia  Assessment & Plan  · Mild hypokalemia POA  · Today K 3 3, Mag 1 6  Repleted  · Monitor BMP/Mag daily      VTE Pharmacologic Prophylaxis: VTE Score: 5 High Risk (Score >/= 5) - Pharmacological DVT Prophylaxis Ordered: apixaban (Eliquis)  Sequential Compression Devices Ordered  Patient Centered Rounds: I performed bedside rounds with nursing staff today  Discussions with Specialists or Other Care Team Provider: None    Education and Discussions with Family / Patient: Updated  (son) via phone  Time Spent for Care: 45 minutes  More than 50% of total time spent on counseling and coordination of care as described above      Current Length of Stay: 1 day(s)  Current Patient Status: Inpatient   Certification Statement: The patient will continue to require additional inpatient hospital stay due to COVID treatment, IV diuresis  Discharge Plan: Anticipate discharge in 24-48 hrs to home  Code Status: Level 1 - Full Code    Subjective:   Patient is seen at bedside this a m , reports increased SOB this morning, does report occasional thick mucus plugs expectorated from tracheostomy, denies chest pain, palpitations  Objective:     Vitals:   Temp (24hrs), Av 7 °F (36 5 °C), Min:97 2 °F (36 2 °C), Max:98 2 °F (36 8 °C)    Temp:  [97 2 °F (36 2 °C)-98 2 °F (36 8 °C)] 98 2 °F (36 8 °C)  HR:  [57-72] 57  Resp:  [18-21] 20  BP: (142-167)/(68-91) 143/83  SpO2:  [97 %-100 %] 97 %  Body mass index is 28 29 kg/m²  Input and Output Summary (last 24 hours): Intake/Output Summary (Last 24 hours) at 2022 1438  Last data filed at 2022 0805  Gross per 24 hour   Intake 200 ml   Output 2500 ml   Net -2300 ml       Physical Exam:   Physical Exam  Constitutional:       General: She is not in acute distress  Appearance: She is ill-appearing  She is not toxic-appearing or diaphoretic  HENT:      Mouth/Throat:      Comments: Tracheostomy site clean without surrounding erythema/edema or drainage, occasional mucus expectorated  Cardiovascular:      Rate and Rhythm: Normal rate and regular rhythm  Pulses: Normal pulses  Heart sounds: Normal heart sounds  Pulmonary:      Effort: Pulmonary effort is normal  No respiratory distress  Breath sounds: Rales present  Comments: Diffuse rales across lung fields  Abdominal:      General: Bowel sounds are normal  There is no distension  Palpations: Abdomen is soft  Tenderness: There is no abdominal tenderness  Musculoskeletal:         General: No swelling or tenderness  Skin:     General: Skin is warm  Neurological:      General: No focal deficit present  Mental Status: She is alert     Psychiatric:         Mood and Affect: Mood normal          Behavior: Behavior normal  Additional Data:     Labs:  Results from last 7 days   Lab Units 05/21/22  0436   WBC Thousand/uL 9 03   HEMOGLOBIN g/dL 9 1*   HEMATOCRIT % 30 2*   PLATELETS Thousands/uL 400*   NEUTROS PCT % 71   LYMPHS PCT % 20   MONOS PCT % 7   EOS PCT % 1     Results from last 7 days   Lab Units 05/21/22  0436 05/19/22  1848   SODIUM mmol/L 141 134*   POTASSIUM mmol/L 3 3* 3 2*   CHLORIDE mmol/L 104 100   CO2 mmol/L 27 26   BUN mg/dL 17 15   CREATININE mg/dL 0 76 0 84   ANION GAP mmol/L 10 8   CALCIUM mg/dL 7 8* 8 3*   ALBUMIN g/dL  --  3 3*   TOTAL BILIRUBIN mg/dL  --  0 53   ALK PHOS U/L  --  94   ALT U/L  --  7   AST U/L  --  7*   GLUCOSE RANDOM mg/dL 204* 474*         Results from last 7 days   Lab Units 05/21/22  1231 05/21/22  0737 05/21/22  0134 05/20/22  2052 05/20/22  1649 05/20/22  1115 05/20/22  0731 05/20/22  0442 05/20/22  0209 05/20/22  0010   POC GLUCOSE mg/dl 384* 225* 370* 434* 224* 117 116 215* 402* >500*         Results from last 7 days   Lab Units 05/19/22  1848   PROCALCITONIN ng/ml 0 09       Lines/Drains:  Invasive Devices  Report    Peripheral Intravenous Line  Duration           Peripheral IV 05/19/22 Right Antecubital 1 day          Airway  Duration           Surgical Airway Shiley Fenestrated 80 days                    Imaging: Reviewed radiology reports from this admission including: chest xray    Recent Cultures (last 7 days):         Last 24 Hours Medication List:   Current Facility-Administered Medications   Medication Dose Route Frequency Provider Last Rate    amiodarone  100 mg Oral Daily With Breakfast DUANE Burkett-GAIL      apixaban  5 mg Oral BID DUANE Burkett-GAIL      atorvastatin  40 mg Oral Before Dinner DUANE Burkett-GAIL      benzonatate  100 mg Oral TID PRN Joy Mohs, CRNP      famotidine  20 mg Oral BID DUANE Burkett-C      furosemide  40 mg Intravenous BID Leda Flood, PA-C      guaiFENesin  600 mg Oral Q12H [facility] Leda Hill PA-C      insulin glargine  12 Units Subcutaneous HS Anne Aburto PA-C      insulin lispro  1-6 Units Subcutaneous TID AC Leda Hill, DONALD      insulin lispro  1-6 Units Subcutaneous HS Leda Hill PA-C      insulin lispro  1-6 Units Subcutaneous 0200 Leda Hill PA-C      insulin lispro  7 Units Subcutaneous TID With Meals Anne Aburto PA-C      ipratropium  0 5 mg Nebulization TID Syed Goldman PA-C      levalbuterol  1 25 mg Nebulization TID Leda Hill PA-C      LORazepam  0 5 mg Oral Q6H PRN Lani Moise MD      losartan  50 mg Oral Daily Leda Hill PA-C      melatonin  6 mg Oral HS Leda Hill PA-C      methylPREDNISolone sodium succinate  40 mg Intravenous Q24H Leda Hill PA-C      metoprolol tartrate  25 mg Oral Q12H [facility] Leda Hill PA-C      polyethylene glycol  17 g Oral Daily Leda Hill PA-C      pregabalin  75 mg Oral Daily Leda Hill PA-C      remdesivir  100 mg Intravenous Q24H Leda Hill, [place]      spironolactone  100 mg Oral Daily Leda Hill PA-C      ticagrelor  90 mg Oral Q12H [facility] Leda Hill PA-C          Today, Patient Was Seen By: Anne Aburto PA-C    **Please Note: This note may have been constructed using a voice recognition system  **

## 2022-05-21 NOTE — ASSESSMENT & PLAN NOTE
· Patient with mild symptoms, and currently at baseline oxygen requirement   However patient is high risk given presence of tracheostomy, CHF and chronic respiratory failure, will treat per protocol  · COVID pathway:  · C/w Remdesivir (Day 2/5), Dexamethasome (Day 2/10)  · Inflammatory markers:  · BNP: 454  · Trop: 20->24->23, CK:37, CRP: 31 7, Procal: 0 09   · D-dimer: 0 67  · AC: C/w home Eliquis 5mg PO BID  · Monitor respiratory status notify provider for increasing oxygen requirements  · Respiratory protocol  · May discontinue steroids and remdesivir upon discharge

## 2022-05-21 NOTE — PLAN OF CARE
Problem: INFECTION - ADULT  Goal: Absence or prevention of progression during hospitalization  Description: INTERVENTIONS:  - Assess and monitor for signs and symptoms of infection  - Monitor lab/diagnostic results  - Monitor all insertion sites, i e  indwelling lines, tubes, and drains  - Monitor endotracheal if appropriate and nasal secretions for changes in amount and color  - [place] appropriate cooling/warming therapies per order  - Administer medications as ordered  - Instruct and encourage patient and family to use good hand hygiene technique  - Identify and instruct in appropriate isolation precautions for identified infection/condition  Outcome: Progressing     Problem: DISCHARGE PLANNING  Goal: Discharge to home or other facility with appropriate resources  Description: INTERVENTIONS:  - Identify barriers to discharge w/patient and caregiver  - Arrange for needed discharge resources and transportation as appropriate  - Identify discharge learning needs (meds, wound care, etc )  - Arrange for interpretive services to assist at discharge as needed  - Refer to Case Management Department for coordinating discharge planning if the patient needs post-hospital services based on physician/advanced practitioner order or complex needs related to functional status, cognitive ability, or social support system  Outcome: Progressing     Problem: Knowledge Deficit  Goal: Patient/family/caregiver demonstrates understanding of disease process, treatment plan, medications, and discharge instructions  Description: Complete learning assessment and assess knowledge base    Interventions:  - Provide teaching at level of understanding  - Provide teaching via preferred learning methods  Outcome: Progressing     Problem: RESPIRATORY - ADULT  Goal: Achieves optimal ventilation and oxygenation  Description: INTERVENTIONS:  - Assess for changes in respiratory status  - Assess for changes in mentation and behavior  - Position to facilitate oxygenation and minimize respiratory effort  - Oxygen administered by appropriate delivery if ordered  - Initiate smoking cessation education as indicated  - Encourage broncho-pulmonary hygiene including cough, deep breathe, Incentive Spirometry  - Assess the need for suctioning and aspirate as needed  - Assess and instruct to report SOB or any respiratory difficulty  - Respiratory Therapy support as indicated  Outcome: Progressing

## 2022-05-21 NOTE — ASSESSMENT & PLAN NOTE
Lab Results   Component Value Date    HGBA1C 6 6 (H) 01/01/2022       Recent Labs     05/20/22 2052 05/21/22  0134 05/21/22  0737 05/21/22  1231   POCGLU 434* 370* 225* 384*       Blood Sugar Average: Last 72 hrs:  · (P) 686 [id number]   · Home regimen: Lantus 8 units HS, Humalog 3 units TID with meals  · Hyperglycemia POA, does not appear to be in DKA  · Patient remains hyperglycemic with -300s, likely steroid induced   Total insulin last 24 hrs: 41 units  · Will increase Lantus to 12 units q h s , Humalog to 7 units TID with meals, continue SSI coverage with Accu-Cheks ACHS  · Hypoglycemia protocol

## 2022-05-21 NOTE — ASSESSMENT & PLAN NOTE
· Suspect to be multifactorial due to mild CHF exacerbation, COVID-19 infection, anxiety  · Patient also with chronic anemia, Hgb within baseline   · Afebrile, no leukocytosis, does not meet SIRs/sepsis criteria  · CT chest/abdomen/pelvis: Pulmonary edema likely secondary CHF  Severe cardiomegaly  Unchanged 42 mm ascending aortic aneurysm     · Currently at baseline oxygen requirements  · See treatment plan as presented below

## 2022-05-22 LAB
ALBUMIN SERPL BCP-MCNC: 3.1 G/DL (ref 3.5–5)
ALP SERPL-CCNC: 77 U/L (ref 34–104)
ALT SERPL W P-5'-P-CCNC: 5 U/L (ref 7–52)
ANION GAP SERPL CALCULATED.3IONS-SCNC: 8 MMOL/L (ref 4–13)
AST SERPL W P-5'-P-CCNC: 7 U/L (ref 13–39)
ATRIAL RATE: 66 BPM
BASOPHILS # BLD AUTO: 0.01 THOUSANDS/ΜL (ref 0–0.1)
BASOPHILS NFR BLD AUTO: 0 % (ref 0–1)
BILIRUB SERPL-MCNC: 0.47 MG/DL (ref 0.2–1)
BUN SERPL-MCNC: 17 MG/DL (ref 5–25)
CALCIUM ALBUM COR SERPL-MCNC: 9.4 MG/DL (ref 8.3–10.1)
CALCIUM SERPL-MCNC: 8.7 MG/DL (ref 8.4–10.2)
CHLORIDE SERPL-SCNC: 99 MMOL/L (ref 96–108)
CO2 SERPL-SCNC: 30 MMOL/L (ref 21–32)
CREAT SERPL-MCNC: 0.81 MG/DL (ref 0.6–1.3)
CRP SERPL QL: 24.4 MG/L
EOSINOPHIL # BLD AUTO: 0.08 THOUSAND/ΜL (ref 0–0.61)
EOSINOPHIL NFR BLD AUTO: 1 % (ref 0–6)
ERYTHROCYTE [DISTWIDTH] IN BLOOD BY AUTOMATED COUNT: 17.2 % (ref 11.6–15.1)
EST. AVERAGE GLUCOSE BLD GHB EST-MCNC: 318 MG/DL
GFR SERPL CREATININE-BSD FRML MDRD: 82 ML/MIN/1.73SQ M
GLUCOSE SERPL-MCNC: 137 MG/DL (ref 65–140)
GLUCOSE SERPL-MCNC: 174 MG/DL (ref 65–140)
GLUCOSE SERPL-MCNC: 248 MG/DL (ref 65–140)
GLUCOSE SERPL-MCNC: 303 MG/DL (ref 65–140)
GLUCOSE SERPL-MCNC: 409 MG/DL (ref 65–140)
GLUCOSE SERPL-MCNC: 412 MG/DL (ref 65–140)
GLUCOSE SERPL-MCNC: 455 MG/DL (ref 65–140)
HBA1C MFR BLD: 12.7 %
HCT VFR BLD AUTO: 31.3 % (ref 34.8–46.1)
HGB BLD-MCNC: 9.8 G/DL (ref 11.5–15.4)
IMM GRANULOCYTES # BLD AUTO: 0.06 THOUSAND/UL (ref 0–0.2)
IMM GRANULOCYTES NFR BLD AUTO: 1 % (ref 0–2)
LYMPHOCYTES # BLD AUTO: 1.59 THOUSANDS/ΜL (ref 0.6–4.47)
LYMPHOCYTES NFR BLD AUTO: 13 % (ref 14–44)
MAGNESIUM SERPL-MCNC: 1.9 MG/DL (ref 1.9–2.7)
MCH RBC QN AUTO: 24.6 PG (ref 26.8–34.3)
MCHC RBC AUTO-ENTMCNC: 31.3 G/DL (ref 31.4–37.4)
MCV RBC AUTO: 79 FL (ref 82–98)
MONOCYTES # BLD AUTO: 0.98 THOUSAND/ΜL (ref 0.17–1.22)
MONOCYTES NFR BLD AUTO: 8 % (ref 4–12)
NEUTROPHILS # BLD AUTO: 9.54 THOUSANDS/ΜL (ref 1.85–7.62)
NEUTS SEG NFR BLD AUTO: 77 % (ref 43–75)
NRBC BLD AUTO-RTO: 0 /100 WBCS
P AXIS: 56 DEGREES
PLATELET # BLD AUTO: 412 THOUSANDS/UL (ref 149–390)
PMV BLD AUTO: 11 FL (ref 8.9–12.7)
POTASSIUM SERPL-SCNC: 3.1 MMOL/L (ref 3.5–5.3)
PR INTERVAL: 188 MS
PROCALCITONIN SERPL-MCNC: 0.08 NG/ML
PROT SERPL-MCNC: 6.6 G/DL (ref 6.4–8.4)
QRS AXIS: -46 DEGREES
QRSD INTERVAL: 120 MS
QT INTERVAL: 520 MS
QTC INTERVAL: 545 MS
RBC # BLD AUTO: 3.98 MILLION/UL (ref 3.81–5.12)
SODIUM SERPL-SCNC: 137 MMOL/L (ref 135–147)
T WAVE AXIS: -22 DEGREES
VENTRICULAR RATE: 66 BPM
WBC # BLD AUTO: 12.26 THOUSAND/UL (ref 4.31–10.16)

## 2022-05-22 PROCEDURE — 86140 C-REACTIVE PROTEIN: CPT | Performed by: PHYSICIAN ASSISTANT

## 2022-05-22 PROCEDURE — 80053 COMPREHEN METABOLIC PANEL: CPT | Performed by: PHYSICIAN ASSISTANT

## 2022-05-22 PROCEDURE — 85025 COMPLETE CBC W/AUTO DIFF WBC: CPT | Performed by: PHYSICIAN ASSISTANT

## 2022-05-22 PROCEDURE — 83036 HEMOGLOBIN GLYCOSYLATED A1C: CPT

## 2022-05-22 PROCEDURE — 84145 PROCALCITONIN (PCT): CPT | Performed by: PHYSICIAN ASSISTANT

## 2022-05-22 PROCEDURE — 94760 N-INVAS EAR/PLS OXIMETRY 1: CPT

## 2022-05-22 PROCEDURE — 82948 REAGENT STRIP/BLOOD GLUCOSE: CPT

## 2022-05-22 PROCEDURE — 93010 ELECTROCARDIOGRAM REPORT: CPT | Performed by: INTERNAL MEDICINE

## 2022-05-22 PROCEDURE — 83735 ASSAY OF MAGNESIUM: CPT | Performed by: PHYSICIAN ASSISTANT

## 2022-05-22 PROCEDURE — 99232 SBSQ HOSP IP/OBS MODERATE 35: CPT | Performed by: PHYSICIAN ASSISTANT

## 2022-05-22 PROCEDURE — 94640 AIRWAY INHALATION TREATMENT: CPT

## 2022-05-22 RX ORDER — MAGNESIUM SULFATE 1 G/100ML
1 INJECTION INTRAVENOUS ONCE
Status: COMPLETED | OUTPATIENT
Start: 2022-05-22 | End: 2022-05-22

## 2022-05-22 RX ORDER — INSULIN LISPRO 100 [IU]/ML
5 INJECTION, SOLUTION INTRAVENOUS; SUBCUTANEOUS
Status: DISCONTINUED | OUTPATIENT
Start: 2022-05-22 | End: 2022-05-23 | Stop reason: HOSPADM

## 2022-05-22 RX ORDER — ACETAMINOPHEN 325 MG/1
650 TABLET ORAL EVERY 6 HOURS PRN
Status: DISCONTINUED | OUTPATIENT
Start: 2022-05-22 | End: 2022-05-23 | Stop reason: HOSPADM

## 2022-05-22 RX ORDER — POTASSIUM CHLORIDE 20MEQ/15ML
40 LIQUID (ML) ORAL 2 TIMES DAILY
Status: COMPLETED | OUTPATIENT
Start: 2022-05-22 | End: 2022-05-22

## 2022-05-22 RX ADMIN — GUAIFENESIN 600 MG: 600 TABLET ORAL at 09:14

## 2022-05-22 RX ADMIN — PREGABALIN 75 MG: 75 CAPSULE ORAL at 09:15

## 2022-05-22 RX ADMIN — LEVALBUTEROL HYDROCHLORIDE 1.25 MG: 1.25 SOLUTION RESPIRATORY (INHALATION) at 08:49

## 2022-05-22 RX ADMIN — INSULIN LISPRO 6 UNITS: 100 INJECTION, SOLUTION INTRAVENOUS; SUBCUTANEOUS at 21:10

## 2022-05-22 RX ADMIN — REMDESIVIR 100 MG: 100 INJECTION, POWDER, LYOPHILIZED, FOR SOLUTION INTRAVENOUS at 01:14

## 2022-05-22 RX ADMIN — APIXABAN 5 MG: 5 TABLET, FILM COATED ORAL at 09:14

## 2022-05-22 RX ADMIN — INSULIN GLARGINE 12 UNITS: 100 INJECTION, SOLUTION SUBCUTANEOUS at 21:07

## 2022-05-22 RX ADMIN — GUAIFENESIN 600 MG: 600 TABLET ORAL at 20:50

## 2022-05-22 RX ADMIN — SPIRONOLACTONE 100 MG: 25 TABLET ORAL at 09:20

## 2022-05-22 RX ADMIN — POTASSIUM CHLORIDE 40 MEQ: 20 SOLUTION ORAL at 17:08

## 2022-05-22 RX ADMIN — INSULIN LISPRO 7 UNITS: 100 INJECTION, SOLUTION INTRAVENOUS; SUBCUTANEOUS at 09:53

## 2022-05-22 RX ADMIN — INSULIN LISPRO 3 UNITS: 100 INJECTION, SOLUTION INTRAVENOUS; SUBCUTANEOUS at 16:22

## 2022-05-22 RX ADMIN — METOPROLOL TARTRATE 25 MG: 25 TABLET, FILM COATED ORAL at 20:50

## 2022-05-22 RX ADMIN — AMIODARONE HYDROCHLORIDE 100 MG: 200 TABLET ORAL at 09:19

## 2022-05-22 RX ADMIN — LEVALBUTEROL HYDROCHLORIDE 1.25 MG: 1.25 SOLUTION RESPIRATORY (INHALATION) at 19:59

## 2022-05-22 RX ADMIN — ACETAMINOPHEN 650 MG: 325 TABLET, FILM COATED ORAL at 18:26

## 2022-05-22 RX ADMIN — Medication 6 MG: at 21:07

## 2022-05-22 RX ADMIN — TICAGRELOR 90 MG: 90 TABLET ORAL at 20:50

## 2022-05-22 RX ADMIN — TICAGRELOR 90 MG: 90 TABLET ORAL at 09:14

## 2022-05-22 RX ADMIN — FUROSEMIDE 40 MG: 10 INJECTION, SOLUTION INTRAMUSCULAR; INTRAVENOUS at 09:20

## 2022-05-22 RX ADMIN — FAMOTIDINE 20 MG: 40 POWDER, FOR SUSPENSION ORAL at 09:14

## 2022-05-22 RX ADMIN — MAGNESIUM SULFATE HEPTAHYDRATE 1 G: 1 INJECTION, SOLUTION INTRAVENOUS at 09:12

## 2022-05-22 RX ADMIN — APIXABAN 5 MG: 5 TABLET, FILM COATED ORAL at 17:08

## 2022-05-22 RX ADMIN — FUROSEMIDE 40 MG: 10 INJECTION, SOLUTION INTRAMUSCULAR; INTRAVENOUS at 17:08

## 2022-05-22 RX ADMIN — INSULIN LISPRO 5 UNITS: 100 INJECTION, SOLUTION INTRAVENOUS; SUBCUTANEOUS at 20:51

## 2022-05-22 RX ADMIN — ACETAMINOPHEN 650 MG: 325 TABLET, FILM COATED ORAL at 12:16

## 2022-05-22 RX ADMIN — IPRATROPIUM BROMIDE 0.5 MG: 0.5 SOLUTION RESPIRATORY (INHALATION) at 08:49

## 2022-05-22 RX ADMIN — IPRATROPIUM BROMIDE 0.5 MG: 0.5 SOLUTION RESPIRATORY (INHALATION) at 14:54

## 2022-05-22 RX ADMIN — LOSARTAN POTASSIUM 50 MG: 50 TABLET, FILM COATED ORAL at 09:20

## 2022-05-22 RX ADMIN — LEVALBUTEROL HYDROCHLORIDE 1.25 MG: 1.25 SOLUTION RESPIRATORY (INHALATION) at 14:54

## 2022-05-22 RX ADMIN — LORAZEPAM 0.5 MG: 0.5 TABLET ORAL at 17:15

## 2022-05-22 RX ADMIN — FAMOTIDINE 20 MG: 40 POWDER, FOR SUSPENSION ORAL at 17:08

## 2022-05-22 RX ADMIN — POTASSIUM CHLORIDE 40 MEQ: 20 SOLUTION ORAL at 09:16

## 2022-05-22 RX ADMIN — METOPROLOL TARTRATE 25 MG: 25 TABLET, FILM COATED ORAL at 09:20

## 2022-05-22 RX ADMIN — ATORVASTATIN CALCIUM 40 MG: 40 TABLET, FILM COATED ORAL at 16:18

## 2022-05-22 RX ADMIN — LORAZEPAM 0.5 MG: 0.5 TABLET ORAL at 09:42

## 2022-05-22 RX ADMIN — IPRATROPIUM BROMIDE 0.5 MG: 0.5 SOLUTION RESPIRATORY (INHALATION) at 19:59

## 2022-05-22 RX ADMIN — INSULIN LISPRO 1 UNITS: 100 INJECTION, SOLUTION INTRAVENOUS; SUBCUTANEOUS at 09:53

## 2022-05-22 NOTE — ASSESSMENT & PLAN NOTE
Wt Readings from Last 3 Encounters:   05/22/22 86 6 kg (190 lb 14 7 oz)   05/11/22 86 2 kg (190 lb)   03/28/22 86 kg (189 lb 9 5 oz)     · Patient presented with SOB and orthopnea  · Home regimen: Lasix 40 mg daily, Lopressor 25 mg b i d , Brilinta 90 mg b i d   · Echo 04/2022:  LVEF 50%, moderate hypokinesis of inferior and basal anterolateral walls, moderate concentric hypertrophy  · CT revealing pulmonary edema and severe cardiomegaly, BNP elevated  · Patient today still with SOB, rales on auscultation improved from yesterday  Good urine output of 1360cc past 24 hours  · C/w IV Lasix 40mg BID, responding well   If improved, consider D/C tomorrow on oral Lasix  · Continue home Lopressor, Brilinta  · I&Os, daily weights, fluid restriction

## 2022-05-22 NOTE — ASSESSMENT & PLAN NOTE
· Patient with mild symptoms, and currently at baseline oxygen requirement  However patient is high risk given presence of tracheostomy, CHF and chronic respiratory failure, will treat per protocol  · COVID pathway:  · C/w Remdesivir (Day 3/5)  · S/p 1x dose 40mg IV SoluMedrol, will D/C while patient remains on baseline/improved home O2 requirements and hyperglycemic  · Inflammatory markers:  · BNP: 454  · Trop: 20->24->23, CK:37, CRP: 31 7->24 4, Procal: 0 09->0  08   · D-dimer: 0 67  · AC: C/w home Eliquis 5mg PO BID  · Monitor respiratory status notify provider for increasing oxygen requirements  · Respiratory protocol

## 2022-05-22 NOTE — PLAN OF CARE
Problem: Potential for Falls  Goal: Patient will remain free of falls  Description: INTERVENTIONS:  - Educate patient/family on patient safety including physical limitations  - Instruct patient to call for assistance with activity   - Consult OT/PT to assist with strengthening/mobility   - Keep Call bell within reach  - Keep bed low and locked with side rails adjusted as appropriate  - Keep care items and personal belongings within reach  - Initiate and maintain comfort rounds  - Make Fall Risk Sign visible to staff  - Apply yellow socks and bracelet for high fall risk patients  - Consider moving patient to room near nurses station  Outcome: Progressing     Problem: PAIN - ADULT  Goal: Verbalizes/displays adequate comfort level or baseline comfort level  Description: Interventions:  - Encourage patient to monitor pain and request assistance  - Assess pain using appropriate pain scale  - Administer analgesics based on type and severity of pain and evaluate response  - Implement non-pharmacological measures as appropriate and evaluate response  - Consider cultural and social influences on pain and pain management  - Notify physician/advanced practitioner if interventions unsuccessful or patient reports new pain  Outcome: Progressing     Problem: INFECTION - ADULT  Goal: Absence or prevention of progression during hospitalization  Description: INTERVENTIONS:  - Assess and monitor for signs and symptoms of infection  - Monitor lab/diagnostic results  - Monitor all insertion sites, i e  I peripheral IV trach  - Monitor endotracheal if appropriate and nasal secretions for changes in amount and color  - [place] appropriate cooling/warming therapies per order  - Administer medications as ordered  - Instruct and encourage patient and family to use good hand hygiene technique  - Identify and instruct in appropriate isolation precautions for identified infection/condition  Outcome: Progressing  Goal: Absence of fever/infection during neutropenic period  Description: INTERVENTIONS:  - Monitor WBC    Outcome: Progressing     Problem: SAFETY ADULT  Goal: Patient will remain free of falls  Description: INTERVENTIONS:  - Educate patient/family on patient safety including physical limitations  - Instruct patient to call for assistance with activity   - Consult OT/PT to assist with strengthening/mobility   - Keep Call bell within reach  - Keep bed low and locked with side rails adjusted as appropriate  - Keep care items and personal belongings within reach  - Initiate and maintain comfort rounds  - Make Fall Risk Sign visible to staff  - Apply yellow socks and bracelet for high fall risk patients  - Consider moving patient to room near nurses station  Outcome: Progressing  Goal: Maintain or return to baseline ADL function  Description: INTERVENTIONS:  - Educate patient/family on patient safety including physical limitations  - Instruct patient to call for assistance with activity   - Consult OT/PT to assist with strengthening/mobility   - Keep Call bell within reach  - Keep bed low and locked with side rails adjusted as appropriate  - Keep care items and personal belongings within reach  - Initiate and maintain comfort rounds  - Make Fall Risk Sign visible to staff  - Apply yellow socks and bracelet for high fall risk patients  - Consider moving patient to room near nurses station  Outcome: Progressing  Goal: Maintains/Returns to pre admission functional level  Description: INTERVENTIONS:  - Perform BMAT or MOVE assessment daily    - Set and communicate daily mobility goal to care team and patient/family/caregiver  - Collaborate with rehabilitation services on mobility goals if consulted  - Perform Range of Motion 2 times a day  - Reposition patient every 2 hours    - Dangle patient 2 times a day  - Stand patient 2 times a day  - Ambulate patient 2 times a day  - Out of bed to chair 2 times a day   - Out of bed for meals 2 times a day  - Out of bed for toileting  - Record patient progress and toleration of activity level   Outcome: Progressing     Problem: DISCHARGE PLANNING  Goal: Discharge to home or other facility with appropriate resources  Description: INTERVENTIONS:  - Identify barriers to discharge w/patient and caregiver  - Arrange for needed discharge resources and transportation as appropriate  - Identify discharge learning needs (meds, wound care, etc )  - Arrange for interpretive services to assist at discharge as needed  - Refer to Case Management Department for coordinating discharge planning if the patient needs post-hospital services based on physician/advanced practitioner order or complex needs related to functional status, cognitive ability, or social support system  Outcome: Progressing     Problem: Knowledge Deficit  Goal: Patient/family/caregiver demonstrates understanding of disease process, treatment plan, medications, and discharge instructions  Description: Complete learning assessment and assess knowledge base    Interventions:  - Provide teaching at level of understanding  - Provide teaching via preferred learning methods  Outcome: Progressing     Problem: RESPIRATORY - ADULT  Goal: Achieves optimal ventilation and oxygenation  Description: INTERVENTIONS:  - Assess for changes in respiratory status  - Assess for changes in mentation and behavior  - Position to facilitate oxygenation and minimize respiratory effort  - Oxygen administered by appropriate delivery if ordered  - Initiate smoking cessation education as indicated  - Encourage broncho-pulmonary hygiene including cough, deep breathe, Incentive Spirometry  - Assess the need for suctioning and aspirate as needed  - Assess and instruct to report SOB or any respiratory difficulty  - Respiratory Therapy support as indicated  Outcome: Progressing

## 2022-05-22 NOTE — ASSESSMENT & PLAN NOTE
· Chronically on 10 L trach mask  · Continue tracheostomy care  · No increased requirement of oxygen, improved at 8L trach mask currently

## 2022-05-22 NOTE — PLAN OF CARE
Problem: Potential for Falls  Goal: Patient will remain free of falls  Description: INTERVENTIONS:  - Educate patient/family on patient safety including physical limitations  - Instruct patient to call for assistance with activity   - Consult OT/PT to assist with strengthening/mobility   - Keep Call bell within reach  - Keep bed low and locked with side rails adjusted as appropriate  - Keep care items and personal belongings within reach  - Initiate and maintain comfort rounds  - Make Fall Risk Sign visible to staff  - Apply yellow socks and bracelet for high fall risk patients  - Consider moving patient to room near nurses station  Outcome: Progressing     Problem: PAIN - ADULT  Goal: Verbalizes/displays adequate comfort level or baseline comfort level  Description: Interventions:  - Encourage patient to monitor pain and request assistance  - Assess pain using appropriate pain scale  - Administer analgesics based on type and severity of pain and evaluate response  - Implement non-pharmacological measures as appropriate and evaluate response  - Consider cultural and social influences on pain and pain management  - Notify physician/advanced practitioner if interventions unsuccessful or patient reports new pain  Outcome: Progressing     Problem: INFECTION - ADULT  Goal: Absence or prevention of progression during hospitalization  Description: INTERVENTIONS:  - Assess and monitor for signs and symptoms of infection  - Monitor lab/diagnostic results  - Monitor all insertion sites, i e  I peripheral IV trach  - Monitor endotracheal if appropriate and nasal secretions for changes in amount and color  - [place] appropriate cooling/warming therapies per order  - Administer medications as ordered  - Instruct and encourage patient and family to use good hand hygiene technique  - Identify and instruct in appropriate isolation precautions for identified infection/condition  Outcome: Progressing  Goal: Absence of fever/infection during neutropenic period  Description: INTERVENTIONS:  - Monitor WBC    Outcome: Progressing     Problem: SAFETY ADULT  Goal: Patient will remain free of falls  Description: INTERVENTIONS:  - Educate patient/family on patient safety including physical limitations  - Instruct patient to call for assistance with activity   - Consult OT/PT to assist with strengthening/mobility   - Keep Call bell within reach  - Keep bed low and locked with side rails adjusted as appropriate  - Keep care items and personal belongings within reach  - Initiate and maintain comfort rounds  - Make Fall Risk Sign visible to staff  - Apply yellow socks and bracelet for high fall risk patients  - Consider moving patient to room near nurses station  Outcome: Progressing  Goal: Maintain or return to baseline ADL function  Description: INTERVENTIONS:  - Educate patient/family on patient safety including physical limitations  - Instruct patient to call for assistance with activity   - Consult OT/PT to assist with strengthening/mobility   - Keep Call bell within reach  - Keep bed low and locked with side rails adjusted as appropriate  - Keep care items and personal belongings within reach  - Initiate and maintain comfort rounds  - Make Fall Risk Sign visible to staff  - Apply yellow socks and bracelet for high fall risk patients  - Consider moving patient to room near nurses station  Outcome: Progressing  Goal: Maintains/Returns to pre admission functional level  Description: INTERVENTIONS:  - Perform BMAT or MOVE assessment daily    - Set and communicate daily mobility goal to care team and patient/family/caregiver  - Collaborate with rehabilitation services on mobility goals if consulted  - Perform Range of Motion 2 times a day  - Reposition patient every 2 hours    - Dangle patient 2 times a day  - Stand patient 2 times a day  - Ambulate patient 3 times a day  - Out of bed to chair 3 times a day   - Out of bed for meals 3 times a day  - Out of bed for toileting  - Record patient progress and toleration of activity level   Outcome: Progressing     Problem: DISCHARGE PLANNING  Goal: Discharge to home or other facility with appropriate resources  Description: INTERVENTIONS:  - Identify barriers to discharge w/patient and caregiver  - Arrange for needed discharge resources and transportation as appropriate  - Identify discharge learning needs (meds, wound care, etc )  - Arrange for interpretive services to assist at discharge as needed  - Refer to Case Management Department for coordinating discharge planning if the patient needs post-hospital services based on physician/advanced practitioner order or complex needs related to functional status, cognitive ability, or social support system  Outcome: Progressing     Problem: Knowledge Deficit  Goal: Patient/family/caregiver demonstrates understanding of disease process, treatment plan, medications, and discharge instructions  Description: Complete learning assessment and assess knowledge base    Interventions:  - Provide teaching at level of understanding  - Provide teaching via preferred learning methods  Outcome: Progressing     Problem: RESPIRATORY - ADULT  Goal: Achieves optimal ventilation and oxygenation  Description: INTERVENTIONS:  - Assess for changes in respiratory status  - Assess for changes in mentation and behavior  - Position to facilitate oxygenation and minimize respiratory effort  - Oxygen administered by appropriate delivery if ordered  - Initiate smoking cessation education as indicated  - Encourage broncho-pulmonary hygiene including cough, deep breathe, Incentive Spirometry  - Assess the need for suctioning and aspirate as needed  - Assess and instruct to report SOB or any respiratory difficulty  - Respiratory Therapy support as indicated  Outcome: Progressing

## 2022-05-22 NOTE — PROGRESS NOTES
Yared U  67   Progress Note - Nancy Horton 1965, 55 y o  female MRN: [id number]  Unit/Bed#: -01 Encounter: [id number]  Primary Care Provider: Verito Velarde MD   Date and time admitted to hospital: 5/19/2022  6:03 PM    * Shortness of breath  Assessment & Plan  · Suspect to be multifactorial due to mild CHF exacerbation, COVID-19 infection, anxiety  · Patient also with chronic anemia, Hgb within baseline   · Afebrile, does not meet SIRs/sepsis criteria  Mild leukocytosis likely reactive with steroid use  · CT chest/abdomen/pelvis: Pulmonary edema likely secondary CHF  Severe cardiomegaly  Unchanged 42 mm ascending aortic aneurysm  · Currently at baseline/improved oxygen requirements at 8L trach mask  · See treatment plan as presented below    Acute on chronic combined systolic and diastolic heart failure (HCC)  Assessment & Plan  Wt Readings from Last 3 Encounters:   05/22/22 86 6 kg (190 lb 14 7 oz)   05/11/22 86 2 kg (190 lb)   03/28/22 86 kg (189 lb 9 5 oz)     · Patient presented with SOB and orthopnea  · Home regimen: Lasix 40 mg daily, Lopressor 25 mg b i d , Brilinta 90 mg b i d   · Echo 04/2022:  LVEF 50%, moderate hypokinesis of inferior and basal anterolateral walls, moderate concentric hypertrophy  · CT revealing pulmonary edema and severe cardiomegaly, BNP elevated  · Patient today still with SOB, rales on auscultation improved from yesterday  Good urine output of 1360cc past 24 hours  · C/w IV Lasix 40mg BID, responding well  If improved, consider D/C tomorrow on oral Lasix  · Continue home Lopressor, Brilinta  · I&Os, daily weights, fluid restriction    COVID-19  Assessment & Plan  · Patient with mild symptoms, and currently at baseline oxygen requirement   However patient is high risk given presence of tracheostomy, CHF and chronic respiratory failure, will treat per protocol  · COVID pathway:  · C/w Remdesivir (Day 3/5)  · S/p 1x dose 40mg IV SoluMedrol, will D/C while patient remains on baseline/improved home O2 requirements and hyperglycemic  · Inflammatory markers:  · BNP: 454  · Trop: 20->24->23, CK:37, CRP: 31 7->24 4, Procal: 0 09->0  08   · D-dimer: 0 67  · AC: C/w home Eliquis 5mg PO BID  · Monitor respiratory status notify provider for increasing oxygen requirements  · Respiratory protocol    Type 2 diabetes mellitus treated with insulin [facility])  Assessment & Plan  Lab Results   Component Value Date    HGBA1C 6 6 (H) 01/01/2022       Recent Labs     05/21/22 2018 05/22/22  0124 05/22/22  0446 05/22/22  0924   POCGLU >500* 409* 455* 174*       Blood Sugar Average: Last 72 hrs:  · (P) 760 [id number]   · Home regimen: Lantus 8 units HS, Humalog 3 units TID with meals  · Hyperglycemia POA, does not appear to be in DKA  · Patient remains hyperglycemic, BG in 400s - >500 overnight  S/p IV regular insulin 5 units -> 10 units x 3 overnight (35 additional units total)  · BG this a m  stable, will recheck again in 2 hours  Suspect to be related to steroid use, D/C today and will monitor off SoluMedrol  · C/w Lantus 12 units q h s , Humalog to 7 units TID with meals and SSI coverage with Accu-Cheks ACHS  · Hypoglycemia protocol    Prolonged Q-T interval on ECG  Assessment & Plan  ·  an admission  · Repeat EKG 5/21 , continue holding Lexapro and trazodone     Chronic hypoxemic respiratory failure (HCC)  Assessment & Plan  · Chronically on 10 L trach mask  · Continue tracheostomy care  · No increased requirement of oxygen, improved at 8L trach mask currently    Hypokalemia  Assessment & Plan  · Mild hypokalemia POA  · Today K 3 1, Mag 1 9  Repleted  · Monitor BMP/Mag daily      VTE Pharmacologic Prophylaxis: VTE Score: 5 High Risk (Score >/= 5) - Pharmacological DVT Prophylaxis Ordered: apixaban (Eliquis)  Sequential Compression Devices Ordered  Patient Centered Rounds: I performed bedside rounds with nursing staff today    Discussions with Specialists or Other Care Team Provider: None    Education and Discussions with Family / Patient: Patient declined call to   Time Spent for Care: 45 minutes  More than 50% of total time spent on counseling and coordination of care as described above  Current Length of Stay: 2 day(s)  Current Patient Status: Inpatient   Certification Statement: The patient will continue to require additional inpatient hospital stay due to Hyperglycemia, monitor off IV steroids, IV diuresis  Discharge Plan: Anticipate discharge in 24-48 hrs to home  Code Status: Level 1 - Full Code    Subjective:   Patient is seen at bedside this a m , reports she still feels fairly short of breath, with occasional blood-tinged tracheal aspirate/sputum  Denies lightheadedness/dizziness, nausea/vomiting, diarrhea  Objective:     Vitals:   Temp (24hrs), Av 4 °F (36 9 °C), Min:98 °F (36 7 °C), Max:98 8 °F (37 1 °C)    Temp:  [98 °F (36 7 °C)-98 8 °F (37 1 °C)] 98 °F (36 7 °C)  HR:  [60-73] 60  Resp:  [16-20] 16  BP: (155-164)/(86-88) 164/88  SpO2:  [91 %-98 %] 97 %  Body mass index is 28 19 kg/m²  Input and Output Summary (last 24 hours): Intake/Output Summary (Last 24 hours) at 2022 1048  Last data filed at 2022 0459  Gross per 24 hour   Intake 1360 ml   Output 4150 ml   Net -2790 ml       Physical Exam:   Physical Exam  Constitutional:       General: She is not in acute distress  Appearance: Normal appearance  She is not ill-appearing, toxic-appearing or diaphoretic  Cardiovascular:      Rate and Rhythm: Normal rate and regular rhythm  Pulses: Normal pulses  Heart sounds: Normal heart sounds  Pulmonary:      Effort: Pulmonary effort is normal  No respiratory distress  Breath sounds: Rales present  Comments: Mild-mod diffuse rales across lung fields, improved from yesterday  Abdominal:      General: Bowel sounds are normal  There is no distension  Palpations: Abdomen is soft  Tenderness: There is no abdominal tenderness  Musculoskeletal:         General: No swelling or tenderness  Skin:     General: Skin is warm  Neurological:      General: No focal deficit present  Mental Status: She is alert  Psychiatric:         Mood and Affect: Mood normal          Behavior: Behavior normal           Additional Data:     Labs:  Results from last 7 days   Lab Units 05/22/22  0459   WBC Thousand/uL 12 26*   HEMOGLOBIN g/dL 9 8*   HEMATOCRIT % 31 3*   PLATELETS Thousands/uL 412*   NEUTROS PCT % 77*   LYMPHS PCT % 13*   MONOS PCT % 8   EOS PCT % 1     Results from last 7 days   Lab Units 05/22/22  0459   SODIUM mmol/L 137   POTASSIUM mmol/L 3 1*   CHLORIDE mmol/L 99   CO2 mmol/L 30   BUN mg/dL 17   CREATININE mg/dL 0 81   ANION GAP mmol/L 8   CALCIUM mg/dL 8 7   ALBUMIN g/dL 3 1*   TOTAL BILIRUBIN mg/dL 0 47   ALK PHOS U/L 77   ALT U/L 5*   AST U/L 7*   GLUCOSE RANDOM mg/dL 303*         Results from last 7 days   Lab Units 05/22/22  0924 05/22/22  0446 05/22/22  0124 05/21/22  2018 05/21/22  1557 05/21/22  1231 05/21/22  0737 05/21/22  0134 05/20/22  2052 05/20/22  1649 05/20/22  1115 05/20/22  0731   POC GLUCOSE mg/dl 174* 455* 409* >500* 421* 384* 225* 370* 434* 224* 117 116         Results from last 7 days   Lab Units 05/22/22  0459 05/19/22  1848   PROCALCITONIN ng/ml 0 08 0 09       Lines/Drains:  Invasive Devices  Report    Peripheral Intravenous Line  Duration           Peripheral IV 05/19/22 Right Antecubital 2 days          Airway  Duration           Surgical Airway Shiley Fenestrated 81 days                      Imaging: No pertinent imaging reviewed      Recent Cultures (last 7 days):         Last 24 Hours Medication List:   Current Facility-Administered Medications   Medication Dose Route Frequency Provider Last Rate    amiodarone  100 mg Oral Daily With Breakfast Leda Pacheco PA-C      apixaban  5 mg Oral BID Leda Pacheco PA-C      atorvastatin  40 mg Oral Before Dinner Leda Morel, DONALD      benzonatate  100 mg Oral TID PRN NEELAM Delcid      famotidine  20 mg Oral BID Franciso Stallion, DONALD      furosemide  40 mg Intravenous BID Franciso Stallion, DONALD      guaiFENesin  600 mg Oral Q12H [facility] Leda Morel, DONALD      insulin glargine  12 Units Subcutaneous HS Stephen Dubon PA-C      insulin lispro  1-6 Units Subcutaneous TID AC Leda Morel, DONALD      insulin lispro  1-6 Units Subcutaneous HS Leda Morel, DONALD      insulin lispro  1-6 Units Subcutaneous 0200 Leda Morel, DONALD      insulin lispro  7 Units Subcutaneous TID With Meals Stephen Dubon PA-C      ipratropium  0 5 mg Nebulization TID Altoo Stallamanda, DONALD      levalbuterol  1 25 mg Nebulization TID Leda Morel PA-C      LORazepam  0 5 mg Oral Q6H PRN Chris Lynch MD      losartan  50 mg Oral Daily Leda Morel, DONALD      melatonin  6 mg Oral HS Leda Morel, DONALD      metoprolol tartrate  25 mg Oral Q12H [facility] Leda Morel PA-C      polyethylene glycol  17 g Oral Daily Leda Morel, DONALD      potassium chloride  40 mEq Oral BID Stephen Dubon PA-C      pregabalin  75 mg Oral Daily Leda Morel, DONALD      remdesivir  100 mg Intravenous Q24H Leda Morel, [place]      spironolactone  100 mg Oral Daily Leda Morel, DONALD      ticagrelor  90 mg Oral Q12H [facility] Leda Morel PA-C          Today, Patient Was Seen By: Stephen Dubon PA-C    **Please Note: This note may have been constructed using a voice recognition system  **

## 2022-05-22 NOTE — ASSESSMENT & PLAN NOTE
· Suspect to be multifactorial due to mild CHF exacerbation, COVID-19 infection, anxiety  · Patient also with chronic anemia, Hgb within baseline   · Afebrile, does not meet SIRs/sepsis criteria  Mild leukocytosis likely reactive with steroid use  · CT chest/abdomen/pelvis: Pulmonary edema likely secondary CHF  Severe cardiomegaly  Unchanged 42 mm ascending aortic aneurysm     · Currently at baseline/improved oxygen requirements at 8L trach mask  · See treatment plan as presented below

## 2022-05-22 NOTE — ASSESSMENT & PLAN NOTE
Lab Results   Component Value Date    HGBA1C 6 6 (H) 01/01/2022       Recent Labs     05/21/22  2018 05/22/22  0124 05/22/22  0446 05/22/22  0924   POCGLU >500* 409* 455* 174*       Blood Sugar Average: Last 72 hrs:  · (P) 984 [id number]   · Home regimen: Lantus 8 units HS, Humalog 3 units TID with meals  · Hyperglycemia POA, does not appear to be in DKA  · Patient remains hyperglycemic, BG in 400s - >500 overnight  S/p IV regular insulin 5 units -> 10 units x 3 overnight (35 additional units total)  · BG this a m  stable, will recheck again in 2 hours   Suspect to be related to steroid use, D/C today and will monitor off SoluMedrol  · C/w Lantus 12 units q h s , Humalog to 7 units TID with meals and SSI coverage with Accu-Cheks ACHS  · Hypoglycemia protocol

## 2022-05-22 NOTE — DISCHARGE SUMMARY
Ander 89  Discharge- Mora Grief 1965, 55 y o  female MRN: [id number]  Unit/Bed#: -01 Encounter: [id number]  Primary Care Provider: Karma Carrera MD   Date and time admitted to hospital: 5/19/2022  6:03 PM    Acute on chronic combined systolic and diastolic heart failure (HCC)  Assessment & Plan  Wt Readings from Last 3 Encounters:   05/23/22 82 4 kg (181 lb 10 5 oz)   05/11/22 86 2 kg (190 lb)   03/28/22 86 kg (189 lb 9 5 oz)     · Patient presented with SOB and orthopnea  · Home regimen: Lasix 40 mg daily, Lopressor 25 mg b i d , Brilinta 90 mg b i d   · Echo 04/2022:  LVEF 50%, moderate hypokinesis of inferior and basal anterolateral walls, moderate concentric hypertrophy  · CT revealing pulmonary edema and severe cardiomegaly, BNP elevated  Improved with IV Lasix, transition to oral Lasix 60 mg daily sent a new prescription to pharmacy  · Continue home Lopressor, Brilinta      * Shortness of breath  Assessment & Plan  · Suspect to be multifactorial due to mild CHF exacerbation, COVID-19 infection, anxiety  · Patient also with chronic anemia, Hgb within baseline   · Afebrile, does not meet SIRs/sepsis criteria  Mild leukocytosis likely reactive with steroid use  · CT chest/abdomen/pelvis: Pulmonary edema likely secondary CHF  Severe cardiomegaly  Unchanged 42 mm ascending aortic aneurysm  · Currently at baseline/improved oxygen requirements at 6L trach mask  · See treatment plan as presented below    Prolonged Q-T interval on ECG  Assessment & Plan  Improved  COVID-19  Assessment & Plan  · Patient with mild symptoms, and currently at baseline oxygen requirement   However patient is high risk given presence of tracheostomy, CHF and chronic respiratory failure, will treat per protocol  · COVID pathway:  · C/w Remdesivir (Day 3/5)  · S/p 1x dose 40mg IV SoluMedrol, will D/C while patient remains on baseline/improved home O2 requirements and hyperglycemic  · Inflammatory markers:  · BNP: 454  · Trop: 20->24->23, CK:37, CRP: 31 7->24 4, Procal: 0 09->0  08   · D-dimer: 0 67  · AC: C/w home Eliquis 5mg PO BID  · Monitor respiratory status notify provider for increasing oxygen requirements  Received remdesivir and steroids, steroids discontinued early due to hyperglycemia  Patient has improved oxygen requirements and hence she does not require both remdesivir and steroids at this time  Chronic hypoxemic respiratory failure (HCC)  Assessment & Plan  Stable at baseline requirement of oxygen    Hypokalemia  Assessment & Plan  Resolved    Type 2 diabetes mellitus treated with insulin [facility])  Assessment & Plan  Lab Results   Component Value Date    HGBA1C 12 7 (H) 05/22/2022       Recent Labs     05/22/22  2046 05/23/22  0240 05/23/22  0813 05/23/22  1123   POCGLU 412* 223* 274* 344*       Blood Sugar Average: Last 72 hrs:  · (P) 293 [id number]   Continues to have hyperglycemia, however currently on insulin at home, increase Lantus  Medical Problems             Resolved Problems  Date Reviewed: 5/22/2022   None               Discharging Physician / Practitioner: Florence Olmos MD  PCP: Beverly March MD  Admission Date:   Admission Orders (From admission, onward)     Ordered        05/20/22 1431  Inpatient Admission  Once            05/19/22 2232  Place in Observation  Once                      Discharge Date: 05/23/22    Consultations During Hospital Stay:  · PT/OT home with home therapy    Procedures Performed:   · None    Significant Findings / Test Results:   CT chest abdomen pelvis wo contrast   Final Result by Kendra Day MD (05/19 2035)      Pulmonary edema likely secondary to CHF  Severe cardiomegaly  Unchanged 42 mm ascending aortic aneurysm      No acute findings in the abdomen or pelvis  The study was marked in [facility] for immediate notification              Workstation performed: [id number]             Incidental Findings:   · None     Test Results Pending at Discharge (will require follow up):   · HgbA1c 12 7     Outpatient Tests Requested:  · Follow-up with known cardiologist/pulmonologist   · Follow-up with PCP for management of chronic conditions    Complications:  None    Reason for Admission:  SOB    Hospital Course:   Angel Hanna is a 55 y o  female patient, PMH COPD, 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, T2DM, CHF, who originally presented to the hospital on 5/19/2022 due to SOB, likely multifactorial in setting of mild CHF exacerbation, COVID-19 infection and mucus plugging with tracheostomy  Patient was recently seen in ED on 05/11, had trach collar replaced  On arrival to ED, patient COVID positive, on baseline O2 requirements at 10 L, did not meet SIRS/sepsis criteria  CT c/a/p showed pulmonary edema and severe cardiomegaly, BNP elevated  Patient started on IV Lasix 40 mg BID and 1 5L fluid restriction, started on remdesivir and IV Solu-Medrol, held off on QT prolonging agents as QTc 581 on arrival  Patient also hyperglycemic on admission, not in DKA, started on home insulin regimen and SSI coverage  Throughout course of admission, patient's respiratory status had remained stable, O2 requirements improved to 6 L trach mask-baseline, will need close follow-up outpatient with pulmonology  Patient responded well to IV diuresis, stable for discharge home to continue oral care, will need close follow-up outpatient with Cardiology  Patient received  remdesivir, IV Solu-Medrol discontinued after 1 dose given patient's respiratory status was stable and was persistently hyperglycemic during admission, requiring multiple doses of IV regular insulin  Hyperglycemia improved, likely due to steroid use and acute illness, updated HgbA1c at 12 7, will discharge on 12 units of Lantus qHS and  Humalog TID with meals  will need close follow-up with PCP outpatient  PT/OT evaluated patient, can return home with home therapy       Please see above list of diagnoses and related plan for additional information  Condition at Discharge: fair    Discharge Day Visit / Exam:   Subjective:  Patient denies any chest pain, shortness of breath  Complains of intermittent cough, chronic in nature  Denies any nausea vomiting or abdominal pain  Complains of tiredness  Vitals: Blood Pressure: 124/78 (05/23/22 0810)  Pulse: (!) 53 (05/23/22 0810)  Temperature: 97 5 °F (36 4 °C) (05/23/22 0810)  Temp Source: Tympanic (05/23/22 0810)  Respirations: 15 (05/23/22 0810)  Height: 5' 9" (175 3 cm) (05/19/22 2300)  Weight - Scale: 82 4 kg (181 lb 10 5 oz) (05/23/22 0552)  SpO2: 100 % (05/23/22 0956)  Exam:   Physical Exam  General appearance:  Patient not in acute distress  Eyes:  Pupils equal reacting to light  Neck:  Trach present  ENT:  Moist oral mucous membranes  CVS:  S1-S2 heard, regular rate and rhythm, no pedal edema  Chest:  Bilateral air entry present, course sounds likely from trach  Abdomen:  Soft, nontender, bowel sounds present  CNS:  No focal neurological deficits  Genitourinary: deferred  Skin:  No acute rash   psychiatric:  No psychosis  Musculoskeletal:  No joint deformities    Discussion with Family:  None    Discharge instructions/Information to patient and family:   See after visit summary for information provided to patient and family  Provisions for Follow-Up Care:  See after visit summary for information related to follow-up care and any pertinent home health orders  Disposition:   Home    Planned Readmission:  None     Discharge Statement:  I spent 35 minutes discharging the patient  This time was spent on the day of discharge  I had direct contact with the patient on the day of discharge  Greater than 50% of the total time was spent examining patient, answering all patient questions, arranging and discussing plan of care with patient as well as directly providing post-discharge instructions    Additional time then spent on discharge activities  Discharge Medications:  See after visit summary for reconciled discharge medications provided to patient and/or family        **Please Note: This note may have been constructed using a voice recognition system**

## 2022-05-23 ENCOUNTER — HOME HEALTH ADMISSION (OUTPATIENT)
Dept: HOME HEALTH SERVICES | Facility: HOME HEALTHCARE | Age: 57
End: 2022-05-23

## 2022-05-23 VITALS
WEIGHT: 181.66 LBS | SYSTOLIC BLOOD PRESSURE: 124 MMHG | OXYGEN SATURATION: 100 % | DIASTOLIC BLOOD PRESSURE: 78 MMHG | RESPIRATION RATE: 15 BRPM | HEIGHT: 69 IN | TEMPERATURE: 97.5 F | HEART RATE: 53 BPM | BODY MASS INDEX: 26.91 KG/M2

## 2022-05-23 LAB
ANION GAP SERPL CALCULATED.3IONS-SCNC: 7 MMOL/L (ref 4–13)
BASOPHILS # BLD AUTO: 0.03 THOUSANDS/ΜL (ref 0–0.1)
BASOPHILS NFR BLD AUTO: 0 % (ref 0–1)
BUN SERPL-MCNC: 19 MG/DL (ref 5–25)
CALCIUM SERPL-MCNC: 8.4 MG/DL (ref 8.4–10.2)
CHLORIDE SERPL-SCNC: 103 MMOL/L (ref 96–108)
CO2 SERPL-SCNC: 29 MMOL/L (ref 21–32)
CREAT SERPL-MCNC: 0.81 MG/DL (ref 0.6–1.3)
EOSINOPHIL # BLD AUTO: 0.28 THOUSAND/ΜL (ref 0–0.61)
EOSINOPHIL NFR BLD AUTO: 3 % (ref 0–6)
ERYTHROCYTE [DISTWIDTH] IN BLOOD BY AUTOMATED COUNT: 17.2 % (ref 11.6–15.1)
GFR SERPL CREATININE-BSD FRML MDRD: 82 ML/MIN/1.73SQ M
GLUCOSE SERPL-MCNC: 209 MG/DL (ref 65–140)
GLUCOSE SERPL-MCNC: 223 MG/DL (ref 65–140)
GLUCOSE SERPL-MCNC: 274 MG/DL (ref 65–140)
GLUCOSE SERPL-MCNC: 344 MG/DL (ref 65–140)
HCT VFR BLD AUTO: 32.7 % (ref 34.8–46.1)
HGB BLD-MCNC: 10.1 G/DL (ref 11.5–15.4)
IMM GRANULOCYTES # BLD AUTO: 0.06 THOUSAND/UL (ref 0–0.2)
IMM GRANULOCYTES NFR BLD AUTO: 1 % (ref 0–2)
LYMPHOCYTES # BLD AUTO: 2.89 THOUSANDS/ΜL (ref 0.6–4.47)
LYMPHOCYTES NFR BLD AUTO: 27 % (ref 14–44)
MAGNESIUM SERPL-MCNC: 1.8 MG/DL (ref 1.9–2.7)
MCH RBC QN AUTO: 24.6 PG (ref 26.8–34.3)
MCHC RBC AUTO-ENTMCNC: 30.9 G/DL (ref 31.4–37.4)
MCV RBC AUTO: 80 FL (ref 82–98)
MONOCYTES # BLD AUTO: 0.82 THOUSAND/ΜL (ref 0.17–1.22)
MONOCYTES NFR BLD AUTO: 8 % (ref 4–12)
NEUTROPHILS # BLD AUTO: 6.7 THOUSANDS/ΜL (ref 1.85–7.62)
NEUTS SEG NFR BLD AUTO: 61 % (ref 43–75)
NRBC BLD AUTO-RTO: 0 /100 WBCS
PLATELET # BLD AUTO: 413 THOUSANDS/UL (ref 149–390)
PMV BLD AUTO: 10.5 FL (ref 8.9–12.7)
POTASSIUM SERPL-SCNC: 4 MMOL/L (ref 3.5–5.3)
RBC # BLD AUTO: 4.11 MILLION/UL (ref 3.81–5.12)
SODIUM SERPL-SCNC: 139 MMOL/L (ref 135–147)
WBC # BLD AUTO: 10.78 THOUSAND/UL (ref 4.31–10.16)

## 2022-05-23 PROCEDURE — 99239 HOSP IP/OBS DSCHRG MGMT >30: CPT | Performed by: INTERNAL MEDICINE

## 2022-05-23 PROCEDURE — 97167 OT EVAL HIGH COMPLEX 60 MIN: CPT

## 2022-05-23 PROCEDURE — 94760 N-INVAS EAR/PLS OXIMETRY 1: CPT

## 2022-05-23 PROCEDURE — 82948 REAGENT STRIP/BLOOD GLUCOSE: CPT

## 2022-05-23 PROCEDURE — 97163 PT EVAL HIGH COMPLEX 45 MIN: CPT

## 2022-05-23 PROCEDURE — 85025 COMPLETE CBC W/AUTO DIFF WBC: CPT | Performed by: PHYSICIAN ASSISTANT

## 2022-05-23 PROCEDURE — 94640 AIRWAY INHALATION TREATMENT: CPT

## 2022-05-23 PROCEDURE — 80048 BASIC METABOLIC PNL TOTAL CA: CPT | Performed by: PHYSICIAN ASSISTANT

## 2022-05-23 PROCEDURE — 87081 CULTURE SCREEN ONLY: CPT | Performed by: INTERNAL MEDICINE

## 2022-05-23 PROCEDURE — 83735 ASSAY OF MAGNESIUM: CPT | Performed by: PHYSICIAN ASSISTANT

## 2022-05-23 RX ORDER — INSULIN GLARGINE 100 [IU]/ML
12 INJECTION, SOLUTION SUBCUTANEOUS DAILY
Qty: 15 ML | Refills: 0 | Status: ON HOLD
Start: 2022-05-23 | End: 2022-06-29 | Stop reason: SDUPTHER

## 2022-05-23 RX ORDER — FUROSEMIDE 20 MG/1
60 TABLET ORAL 2 TIMES DAILY
Qty: 180 TABLET | Refills: 0 | Status: SHIPPED | OUTPATIENT
Start: 2022-05-23 | End: 2022-06-08 | Stop reason: ALTCHOICE

## 2022-05-23 RX ADMIN — FAMOTIDINE 20 MG: 40 POWDER, FOR SUSPENSION ORAL at 10:55

## 2022-05-23 RX ADMIN — FUROSEMIDE 40 MG: 10 INJECTION, SOLUTION INTRAMUSCULAR; INTRAVENOUS at 10:57

## 2022-05-23 RX ADMIN — INSULIN LISPRO 5 UNITS: 100 INJECTION, SOLUTION INTRAVENOUS; SUBCUTANEOUS at 13:30

## 2022-05-23 RX ADMIN — INSULIN LISPRO 5 UNITS: 100 INJECTION, SOLUTION INTRAVENOUS; SUBCUTANEOUS at 10:58

## 2022-05-23 RX ADMIN — SPIRONOLACTONE 100 MG: 25 TABLET ORAL at 10:56

## 2022-05-23 RX ADMIN — PREGABALIN 75 MG: 75 CAPSULE ORAL at 10:57

## 2022-05-23 RX ADMIN — INSULIN LISPRO 2 UNITS: 100 INJECTION, SOLUTION INTRAVENOUS; SUBCUTANEOUS at 02:45

## 2022-05-23 RX ADMIN — TICAGRELOR 90 MG: 90 TABLET ORAL at 10:56

## 2022-05-23 RX ADMIN — IPRATROPIUM BROMIDE 0.5 MG: 0.5 SOLUTION RESPIRATORY (INHALATION) at 09:55

## 2022-05-23 RX ADMIN — ACETAMINOPHEN 650 MG: 325 TABLET, FILM COATED ORAL at 06:11

## 2022-05-23 RX ADMIN — AMIODARONE HYDROCHLORIDE 100 MG: 200 TABLET ORAL at 10:56

## 2022-05-23 RX ADMIN — METOPROLOL TARTRATE 25 MG: 25 TABLET, FILM COATED ORAL at 10:56

## 2022-05-23 RX ADMIN — APIXABAN 5 MG: 5 TABLET, FILM COATED ORAL at 10:56

## 2022-05-23 RX ADMIN — GUAIFENESIN 600 MG: 600 TABLET ORAL at 10:57

## 2022-05-23 RX ADMIN — LORAZEPAM 0.5 MG: 0.5 TABLET ORAL at 10:56

## 2022-05-23 RX ADMIN — LORAZEPAM 0.5 MG: 0.5 TABLET ORAL at 00:11

## 2022-05-23 RX ADMIN — LEVALBUTEROL HYDROCHLORIDE 1.25 MG: 1.25 SOLUTION RESPIRATORY (INHALATION) at 09:55

## 2022-05-23 RX ADMIN — LOSARTAN POTASSIUM 50 MG: 50 TABLET, FILM COATED ORAL at 10:56

## 2022-05-23 RX ADMIN — INSULIN LISPRO 4 UNITS: 100 INJECTION, SOLUTION INTRAVENOUS; SUBCUTANEOUS at 10:57

## 2022-05-23 RX ADMIN — REMDESIVIR 100 MG: 100 INJECTION, POWDER, LYOPHILIZED, FOR SOLUTION INTRAVENOUS at 00:30

## 2022-05-23 NOTE — ASSESSMENT & PLAN NOTE
Wt Readings from Last 3 Encounters:   05/23/22 82 4 kg (181 lb 10 5 oz)   05/11/22 86 2 kg (190 lb)   03/28/22 86 kg (189 lb 9 5 oz)     · Patient presented with SOB and orthopnea  · Home regimen: Lasix 40 mg daily, Lopressor 25 mg b i d , Brilinta 90 mg b i d   · Echo 04/2022:  LVEF 50%, moderate hypokinesis of inferior and basal anterolateral walls, moderate concentric hypertrophy  · CT revealing pulmonary edema and severe cardiomegaly, BNP elevated  Improved with IV Lasix, transition to oral Lasix 60 mg daily sent a new prescription to pharmacy  · Continue home Lopressor, Brilinta

## 2022-05-23 NOTE — CASE MANAGEMENT
Case Management Assessment & Discharge Planning Note    Patient name Ezequiel Peterson  Location [address] [id number] MRN [id number]  : 1965 Date 2022       Current Admission Date: 2022  Current Admission Diagnosis:Shortness of breath   Patient Active Problem List    Diagnosis Date Noted    Shortness of breath 2022    Prolonged Q-T interval on ECG 2022    COVID-19 2022    Elevated troponin 2022    Microcytic anemia 2022    Insomnia secondary to anxiety 2022    Chronic systolic heart failure ([address] ) 2022    Chronic hypoxemic respiratory failure ([facility] ) 03/15/2022    Tracheostomy in place [facility]) 2022    Subglottic stenosis 2022    Acute on chronic combined systolic and diastolic heart failure ([address] ) 2022    CAD (coronary artery disease) 2021    Urinary retention 2021    Cerebral aneurysm 2021    Cerebral vascular accident ([address] ) 2021    Acute on chronic respiratory failure with hypoxia ([address] ) 2021    History of Cardiac arrest ([address] ) 2021    A-fib ([address] ) 10/30/2021    History of Ventricular tachycardia ([address] ) 10/27/2021    Cellulitis of left lower extremity 2021    Hypoglycemia 2021    Polypharmacy 2021    Trigger finger, right middle finger 2021    Trigger finger, right ring finger 2021    Diarrhea 2021    Nasal vestibulitis 2021    Orthopnea 2021    CHANTALE (obstructive sleep apnea) 2021    Palpitations 2020    Abscess 10/11/2020    Bacterial vaginosis 2020    Urinary tract infection with hematuria 2020    Epigastric pain 2019    Thoracic aortic aneurysm without rupture ([address] ) 2018    Hypokalemia 2018    Abnormal urinalysis 2018    Vaginal discharge 2018    Yeast vaginitis 2018    Recurrent vaginitis 2018    Cardiac murmur 12/15/2017    Primary insomnia 12/15/2017    Anxiety 07/26/2017    Depression, recurrent ([place] [address] ) 07/26/2017    Retinal hemorrhage due to secondary diabetes ([place] [address] ) 07/26/2017    Fibromyalgia 07/26/2017    Hypertension 07/26/2017    Hypoestrogenism 07/26/2017    Neuropathy 07/26/2017    Chronic pain disorder 06/05/2017    Medically complex patient 06/05/2017    Change in bowel habit 04/24/2017    Screening for colon cancer 12/05/2016    Cough 02/15/2016    Non compliance with medical treatment 01/27/2016    Dizziness 01/26/2016    Gastroesophageal reflux disease with esophagitis 01/26/2016    Vertigo 01/26/2016    Vertebral body hemangioma 08/21/2015    Hemangioma of bone 07/30/2015    Right-sided thoracic back pain 07/23/2015    Thoracic radiculitis 07/23/2015    Carpal tunnel syndrome of left wrist 06/26/2015    Tobacco abuse 06/26/2015    Type 2 diabetes mellitus treated with insulin ([place] [address] ) 06/09/2015    Hyperlipidemia associated with type 2 diabetes mellitus ([place] [address] ) 05/06/2015    Noncompliance with diet and medication regimen 05/06/2015    Shingles 03/25/2015    Diabetic peripheral neuropathy ([place] [address] ) 02/25/2015    Low back pain 02/25/2015    Lumbar radiculopathy 02/25/2015    Overweight 02/25/2015    Sciatica of left side 02/25/2015      LOS (days): 3  Geometric Mean LOS (GMLOS) (days): 5 40  Days to GMLOS:2 5     OBJECTIVE:    Risk of Unplanned Readmission Score: 41 46         Current admission status: Inpatient       Preferred Pharmacy:   [address] [place] [address] [address] [address] [address]  Phone: [phone] Fax: [phone] [place] [address] [address] [facility]  [address] [facility] [address]  Phone: [phone] Fax: [phone]    [facility] #[id number] - [address], [facility] [address] [place] [address] [facility] [address]  Phone: [phone] Fax: 9042 [address] Hector Thomason  Phone: [phone] Fax: [phone]    Primary Care Provider: Flash Felipe MD    Primary Insurance: Major Herder REP  Secondary Insurance: [address] Proxies    There are no active Health Care Proxies on file  Patient Information  Admitted from[de-identified] Home  Mental Status: Alert  During Assessment patient was accompanied by: Not accompanied during assessment  Assessment information provided by[de-identified] Patient  Primary Caregiver: Self  Support Systems: Family members  Home entry access options   Select all that apply : Stairs  Number of steps to enter home : 3  Type of Current Residence: [address] home  Upon entering residence, is there a bedroom on the main floor (no further steps)?: No  A bedroom is located on the following floor levels of residence (select all that apply):: 2nd Floor  Upon entering residence, is there a bathroom on the main floor (no further steps)?: No  Indicate which floors of current residence have a bathroom (select all the apply):: 2nd Floor  Number of steps to 2nd floor from main floor: One Flight  In the last 12 months, was there a time when you were not able to pay the mortgage or rent on time?: No  In the last 12 months, was there a time when you did not have a steady place to sleep or slept in a shelter (including now)?: No  Living Arrangements: Lives w/ Son    Activities of Daily Living Prior to Admission  Functional Status: Independent  Completes ADLs independently?: Yes  Ambulates independently?: Yes  Does patient use assisted devices?: Yes  Assisted Devices (DME) used: Charyl Salvador, Shower Chair  Does patient currently own DME?: Yes  What DME does the patient currently own?: Wheelchair, Shower Chair, Charyl Salvador  Does patient have a history of Outpatient Therapy (PT/OT)?: No  Does the patient have a history of Short-Term Rehab?: Yes  Does patient have a history of HHC?: Yes  Does patient currently have Kieranu Ilda?: No                   Means of Transportation  Means of Transport to Appts[de-identified] Family transport  In the past 12 months, has lack of transportation kept you from medical appointments or from getting medications?: No  In the past 12 months, has lack of transportation kept you from meetings, work, or from getting things needed for daily living?: No  Was application for public transport provided?: No        DISCHARGE DETAILS:    Discharge planning discussed with[de-identified] patient  Freedom of Choice: Yes  Comments - Freedom of Choice: patient choice for [facility] - referral sent via [employer] awaiting response  CM contacted family/caregiver?: Yes  Were Treatment Team discharge recommendations reviewed with patient/caregiver?: Yes  Did patient/caregiver verbalize understanding of patient care needs?: Yes  Were patient/caregiver advised of the risks associated with not following Treatment Team discharge recommendations?: Yes    Contacts  Patient Contacts: Imer Hodges  Relationship to Patient[de-identified] Family  Contact Method: Phone  Phone Number: [phone]  Reason/Outcome: Discharge Planning, Emergency Contact, Referral    [facility]         Is the patient interested in Kirstin Gonsales at discharge?: Yes  [address] Max[address] Gomez requested[de-identified] Nursing, Physical Therapy, Occupational [address] Agency Name[de-identified] [address] [place] [address] Provider[de-identified] PCP  Home Health Services Needed[de-identified] Gait/ADL Training, Evaluate Functional Status and Safety, Strengthening/Theraputic Exercises to Improve Function  Homebound Criteria Met[de-identified] Requires the Assistance of Another Person for Safe Ambulation or to Leave the Home, Uses an Assist Device (i e  cane, walker, etc)  Supporting Clincal Findings[de-identified] Fatigues Easliy in [employer], Limited Endurance    DME Referral Provided  Referral made for DME?: No    Other Referral/Resources/Interventions Provided:  Interventions: HHC  Referral Comments: Patient admitted due to SOB, Pulmonary edema, Sore throat, Dyspnea, Abdominal pain, Tracheostomy care, Elevated BNP level, COVID  PT/OT rcmd [facility]  Pt medically stable for d/c  CM spoke with patient re: dcp  Pt lives with son in three story home with 3 [address], bed and bath on second level  Pt independent with ADLs, primarily utilizes her walker to ambulate and her son provides transport to appointments  Pt relayed hx of [facility]/STR  Pt relayed preference for [facility] - referral sent via [employer], awaiting response  IMM reviewed, pt relayed no concerns for d/c today, copy provided  CM contacted son via phone introduced self and role along with dcp update  CM to follow for further d/c needs           Treatment Team Recommendation: Home with [address]  Discharge Destination Plan[de-identified] Home with [place] at Discharge : Family                             IMM Given (Date):: 05/23/22  IMM Given to[de-identified] Patient

## 2022-05-23 NOTE — PLAN OF CARE
Problem: Potential for Falls  Goal: Patient will remain free of falls  Description: INTERVENTIONS:  - Educate patient/family on patient safety including physical limitations  - Instruct patient to call for assistance with activity   - Consult OT/PT to assist with strengthening/mobility   - Keep Call bell within reach  - Keep bed low and locked with side rails adjusted as appropriate  - Keep care items and personal belongings within reach  - Initiate and maintain comfort rounds  - Make Fall Risk Sign visible to staff  - Apply yellow socks and bracelet for high fall risk patients  - Consider moving patient to room near nurses station  Outcome: Adequate for Discharge     Problem: PAIN - ADULT  Goal: Verbalizes/displays adequate comfort level or baseline comfort level  Description: Interventions:  - Encourage patient to monitor pain and request assistance  - Assess pain using appropriate pain scale  - Administer analgesics based on type and severity of pain and evaluate response  - Implement non-pharmacological measures as appropriate and evaluate response  - Consider cultural and social influences on pain and pain management  - Notify physician/advanced practitioner if interventions unsuccessful or patient reports new pain  Outcome: Adequate for Discharge     Problem: INFECTION - ADULT  Goal: Absence or prevention of progression during hospitalization  Description: INTERVENTIONS:  - Assess and monitor for signs and symptoms of infection  - Monitor lab/diagnostic results  - Monitor all insertion sites, i e  I peripheral IV trach  - Monitor endotracheal if appropriate and nasal secretions for changes in amount and color  - [place] appropriate cooling/warming therapies per order  - Administer medications as ordered  - Instruct and encourage patient and family to use good hand hygiene technique  - Identify and instruct in appropriate isolation precautions for identified infection/condition  Outcome: Adequate for Discharge  Goal: Absence of fever/infection during neutropenic period  Description: INTERVENTIONS:  - Monitor WBC    Outcome: Adequate for Discharge     Problem: SAFETY ADULT  Goal: Patient will remain free of falls  Description: INTERVENTIONS:  - Educate patient/family on patient safety including physical limitations  - Instruct patient to call for assistance with activity   - Consult OT/PT to assist with strengthening/mobility   - Keep Call bell within reach  - Keep bed low and locked with side rails adjusted as appropriate  - Keep care items and personal belongings within reach  - Initiate and maintain comfort rounds  - Make Fall Risk Sign visible to staff  - Apply yellow socks and bracelet for high fall risk patients  - Consider moving patient to room near nurses station  Outcome: Adequate for Discharge  Goal: Maintain or return to baseline ADL function  Description: INTERVENTIONS:  - Educate patient/family on patient safety including physical limitations  - Instruct patient to call for assistance with activity   - Consult OT/PT to assist with strengthening/mobility   - Keep Call bell within reach  - Keep bed low and locked with side rails adjusted as appropriate  - Keep care items and personal belongings within reach  - Initiate and maintain comfort rounds  - Make Fall Risk Sign visible to staff  - Apply yellow socks and bracelet for high fall risk patients  - Consider moving patient to room near nurses station  Outcome: Adequate for Discharge  Goal: Maintains/Returns to pre admission functional level  Description: INTERVENTIONS:  - Perform BMAT or MOVE assessment daily    - Set and communicate daily mobility goal to care team and patient/family/caregiver     - Collaborate with rehabilitation services on mobility goals if consulted  - Out of bed for toileting  - Record patient progress and toleration of activity level   Outcome: Adequate for Discharge     Problem: DISCHARGE PLANNING  Goal: Discharge to home or other facility with appropriate resources  Description: INTERVENTIONS:  - Identify barriers to discharge w/patient and caregiver  - Arrange for needed discharge resources and transportation as appropriate  - Identify discharge learning needs (meds, wound care, etc )  - Arrange for interpretive services to assist at discharge as needed  - Refer to Case Management Department for coordinating discharge planning if the patient needs post-hospital services based on physician/advanced practitioner order or complex needs related to functional status, cognitive ability, or social support system  Outcome: Adequate for Discharge     Problem: Knowledge Deficit  Goal: Patient/family/caregiver demonstrates understanding of disease process, treatment plan, medications, and discharge instructions  Description: Complete learning assessment and assess knowledge base  Interventions:  - Provide teaching at level of understanding  - Provide teaching via preferred learning methods  Outcome: Adequate for Discharge     Problem: RESPIRATORY - ADULT  Goal: Achieves optimal ventilation and oxygenation  Description: INTERVENTIONS:  - Assess for changes in respiratory status  - Assess for changes in mentation and behavior  - Position to facilitate oxygenation and minimize respiratory effort  - Oxygen administered by appropriate delivery if ordered  - Initiate smoking cessation education as indicated  - Encourage broncho-pulmonary hygiene including cough, deep breathe, Incentive Spirometry  - Assess the need for suctioning and aspirate as needed  - Assess and instruct to report SOB or any respiratory difficulty  - Respiratory Therapy support as indicated  Outcome: Adequate for Discharge     Problem: PHYSICAL THERAPY ADULT  Goal: Performs mobility at highest level of function for planned discharge setting  See evaluation for individualized goals    Description: Treatment/Interventions: LE strengthening/ROM, Elevations, Therapeutic exercise, Endurance training, Gait training, Patient/family training, Spoke to nursing, Spoke to MD, Spoke to case management     See flowsheet documentation for full assessment, interventions and recommendations  Outcome: Adequate for Discharge     Problem: OCCUPATIONAL THERAPY ADULT  Goal: Performs self-care activities at highest level of function for planned discharge setting  See evaluation for individualized goals  Description: Treatment Interventions: ADL retraining, Functional transfer training, UE strengthening/ROM, Endurance training, Cognitive reorientation, Patient/family training, Energy conservation, Equipment evaluation/education          See flowsheet documentation for full assessment, interventions and recommendations     Outcome: Adequate for Discharge

## 2022-05-23 NOTE — PHYSICAL THERAPY NOTE
PHYSICAL THERAPY EVALUATION    NAME:  Kelli Red  DATE: 05/23/22    AGE:   55 y o  Mrn:   [id number]  Length Of Stay: 3    ADMIT DX:  Shortness of breath [R06 02]  Pulmonary edema [J81 1]  Sore throat [J02 9]  Dyspnea [R06 00]  Abdominal pain [R10 9]  Tracheostomy care ([address] ) [Z43 0]  Elevated brain natriuretic peptide (BNP) level [R79 89]  COVID [U07 1]    Past Medical History:   Diagnosis Date    Anxiety     Aortic aneurysm (HCC)     Arthritis     Depression     Diabetes mellitus (HCC)     Fibromyalgia     GERD (gastroesophageal reflux disease)     GERD (gastroesophageal reflux disease)     H/O cardiovascular stress test 09/2018    no ischemia  EF 70%  H/O echocardiogram 01/2019    EF 60%  Mild LVH  trivial effusion  Hyperlipidemia     Hypertension     Migraines     Psychiatric disorder     anxiety    Uncontrolled hypertension 2/25/2015    Last Assessment & Plan:  BP today above goal <140/90, apparently asymptomatic  Prior BP elevations were attributed to not taking medication  Consider increased medication if persistent on f/u  Past Surgical History:   Procedure Laterality Date    BACK SURGERY      Lumbar epidural steroid injection    CARDIAC CATHETERIZATION N/A 10/22/2021    Procedure: Cardiac pci;  Surgeon: Ferdinand Eckert MD;  [facility]; Service: Cardiology    CARDIAC CATHETERIZATION  10/22/2021    Procedure: Cardiac catheterization;  Surgeon: Ferdinand Eckert MD;  [facility]; Service: Cardiology    CARDIAC CATHETERIZATION Left 10/27/2021    Procedure: Cardiac Left Heart Cath;  Surgeon: Parker Duane, MD;  [facility]; Service: Cardiology    CARDIAC CATHETERIZATION N/A 10/27/2021    Procedure: Cardiac pci;  Surgeon: Parker Duane, MD;  [facility]; Service: Cardiology    CARDIAC CATHETERIZATION N/A 10/28/2021    Procedure: Cardiac pci;  Surgeon: Silvester Hodgkins, DO;  [facility];   Service: Cardiology    CARPAL TUNNEL RELEASE Left      SECTION      CHOLECYSTECTOMY      COLONOSCOPY      incomplete    COLONOSCOPY      EYE SURGERY      HYSTERECTOMY      Total    OVARIAN CYST REMOVAL      PEG W/TRACHEOSTOMY PLACEMENT N/A 2021    Procedure: TRACHEOSTOMY WITH INSERTION PEG TUBE;  Surgeon: Deb Fung DO;  Location: [facility];  Service: General    TUBAL LIGATION      UPPER GASTROINTESTINAL ENDOSCOPY         Performed at least 2 patient identifiers during session: time: 1056-0141       22 1047   Note Type   Note type Evaluation   Pain Assessment   Pain Assessment Tool 0-10   Pain Score No Pain   Effect of Pain on Daily Activities n/a   Hospital Pain Intervention(s) Repositioned; Ambulation/increased activity   Multiple Pain Sites No   Restrictions/Precautions   Weight Bearing Precautions Per Order No   Other Precautions Contact/isolation; Airborne/isolation;Droplet precautions;Multiple lines;O2;Fall Risk;Fluid restriction  (Trach +9L O2 via trach collar, LIFE VEST, bladimir)   Home Living   Type of Home House   Home Layout Multi-level;Stairs to enter with rails;Bed/bath upstairs;1/2 bath on main level  (3STE w/ HR then 18 steps w/ HR to 2nd floor bedroom and bathroom; half bathroom on first floor)   Bathroom Shower/Tub Tub/shower unit  (per EMR pt has both tub and walk in shower on 2nd floor)   [facility]; Wheelchair-manual;Other (Comment)  (oxygen with trach collar)   Prior Function   Level of [place] Independent with ADLs and functional mobility; Needs assistance with IADLs   Lives With Son  (son and his girlfriend)   Receives Help From Family   ADL Assistance Independent   IADLs Needs assistance   Falls in the last 6 months 0   Comments Pt reports living at home with her supportive family, multiple sets of steps   Primarily stays on the 2nd floor of the home, only completes negotiation of full flight of steps when she needs to leave the home for appts  Family brings her meals upstairs to her  Pt ambulates t/o the home with RW  Wears 10L O2 via trach collar continuously  Reports she previously had home health services, however they stopped "a while ago"  Pt agreeable to and requesting resumption of home care  General   Additional Pertinent History Pt admitted 5/19/2022 with c/o SOB, dx: COVID-19, pulmonary edema  Family/Caregiver Present No   Cognition   Overall Cognitive Status WFL   Arousal/Participation Cooperative   Orientation Level Oriented to person;Oriented to place;Oriented to situation   Memory Within functional limits   Following Commands Follows all commands and directions without difficulty   Subjective   Subjective "I need something for my anxiety "   RUE Assessment   RUE Assessment WFL   LUE Assessment   LUE Assessment WFL   RLE Assessment   RLE Assessment WFL  (grossly 4-/5 to 4/5 MMT throughout)   LLE Assessment   LLE Assessment WFL  (grossly 4-/5 to 4/5 MMT throughout)   Coordination   Movements are Fluid and Coordinated 1  (Pt self reports generalized fatigue and weakness due to recent illnesses and hospitalizations )   Sensation WFL   Light Touch   RLE Light Touch Grossly intact   LLE Light Touch Grossly intact   Proprioception   RLE Proprioception Grossly intact   LLE Proprioception Grossly Intact   Bed Mobility   Additional Comments Bed mobility not completed on this date as pt presents and was left seated EOB  Needs within reach, RN aware of pt status  Transfers   Sit to Stand 5  Supervision   Additional items Assist x 1;Verbal cues   Stand to Sit 5  Supervision   Additional items Assist x 1;Verbal cues   Additional Comments Pt initially with + instability upon standing, no AD support, reaches for wall for improved stability  Therapist provided RW for better support, and balance significantly improved  Ambulation/Elevation   Gait pattern WNL; Decreased foot clearance  (increased WBing through UEs onto RW)   Gait Assistance 5  Supervision   Additional items Assist x 1;Verbal cues   Assistive Device Rolling walker   Distance 10ft - limited due to connection of trach collar to wall supplied O2  Pt also completes 2 minutes of functional static marching with UE support on RW  Stair Management Assistance Not tested  (pt has 3STE and 18 stairs to reach 2nd floor bedroom/bathroom)   Balance   Static Sitting Good   Dynamic Sitting Good   Static Standing Fair +   Dynamic Standing Fair +   Ambulatory Fair +   Endurance Deficit   Endurance Deficit Yes   Activity Tolerance   Activity Tolerance Patient limited by fatigue   Medical Staff Made Aware Spoke with SLIM and DEREK   Nurse Made Aware Spoke with MONE Roach pre/post session   Assessment   Prognosis Good   Problem List Decreased strength;Decreased endurance; Impaired balance;Decreased mobility; Decreased skin integrity   Assessment Pt seen for PT evaluation, cleared by [employer] [place] [employer], activity orders of "ambulate patient"  PT consult received for mobility assessment & discharge needs  Pt admitted 5/19/2022 w/ Shortness of breath, dx: COVID-19  Comorbidities affecting pt's fnxl performance include: anxiety, arthritis, COVID-19, depression, DM, fibromyalgia, HTN, HLD, 42mm aortic aneurysm  PTA, pt was independent with functional mobility with RW, independent with ADLS, requiring assist for IADLS and living with her family in a 2 level home with 3 steps to enter  During PT IE, pt completes all transfers and short in room distance ambulation with RW and S, limited due to generalized fatigue and weakness+SOB+anxiety  The AM-PAC objective tool in combination w/ Barthel Index outcome tool were used to assist in determining pt safety w/ mobility/ADLs & appropriate d/c recommendations, see above for scores  Pt is at risk of falls d/t multiple comorbidities, impaired balance, use of ambulatory aid, acuity of medical illness and ongoing medical treatment of primary dx   Pt's clinical presentation is currently unstable/unpredictable as seen in pt's presentation of vital sign response, increased fall risk, new onset of impairment of functional mobility, decreased endurance and new onset of weakness, and requires high complexity clinical decision making  Pt will benefit from continued PT treatment in order to address impairments, decrease risk of falls, maximize independence w/ fnxl mobility, & ensure safety w/ mobility for transition to next level of care  Based on pt presentation & impairments, pt would most appropriately benefit from return to home with home health PT services  Goals   Patient Goals "to get stronger"   [facility] Expiration Date 06/02/22   Short Term Goal #1 Patient PT goals established in order to address patient self reported goal of "to get stronger"  Pt will: complete all transfers independently in order to increase safety with functional mobility; ambulate >100ft with RW and S in order to increase safety with household and in facility distance functional mobility; negotiate 15-18 stairs with HR assist and S in order to access all areas of her home; improve B LE strength to >/= 4+/5 MMT t/o in order to increase safety with functional mobility and decrease risk of falls; demonstrate understanding and independence with LE strengthening HEP; improve unsupported dynamic standing balance to >/= good grade in order to promote safety and increased independence with mobility; tolerate >3hrs OOB in upright position, in order to improve muscular endurance and respiratory status; improve AM-PAC score to >/= 24/24 in order to increase independence with mobility and decrease burden of care; improve Barthel Index score to >/= 85/100 in order to increase independence and decrease risk of falls  PT Treatment Day 0   Plan   Treatment/Interventions LE strengthening/ROM; Elevations; Therapeutic exercise; Endurance training;Gait training;Patient/family training;Spoke to nursing;Spoke to MD;Spoke to case management   PT Frequency 2-3x/wk   Recommendation   PT Discharge Recommendation Home with home health rehabilitation   AM-PAC Basic Mobility Inpatient   Turning in Bed Without Bedrails 4   Lying on Back to Sitting on Edge of Flat Bed 4   Moving Bed to Chair 3   Standing Up From Chair 3   Walk in Room 3   Climb 3-5 Stairs 3   Basic Mobility Inpatient Raw Score 20   Basic Mobility Standardized Score 43 99   Highest Level Of Mobility   JH-HLM Goal 6: Walk 10 steps or more   JH-HLM Achieved 6: Walk 10 steps or more   Modified [place] Scale   Modified [place] Scale 3   Barthel Index   Feeding 10   Bathing 0   Grooming Score 5   Dressing Score 10   Bladder Score 10   Bowels Score 10   Toilet Use Score 10   Transfers (Bed/Chair) Score 15   Mobility (Level Surface) Score 0   Stairs Score 5   Barthel Index Score 75   End of Consult   Patient Position at End of Consult Seated edge of bed; All needs within reach; Other (comment)  (RN aware of pt status)   End of Consult Comments Based on patient's [place] Highest Level of Mobility scores today, patient currently has a goal of JH-HLM Levels: 6: WALK 10 STEPS OR MORE, to be completed with RN staffing each shift, in order to improve overall activity tolerance and mobility, combat hospital related deconditioning, and maximize outcomes for d/c from the acute care setting  The patient's AM-PAC Basic Mobility Inpatient Short Form Raw Score is 20  A Raw score of greater than 16 suggests the patient may benefit from discharge to home  Please also refer to the recommendation of the Physical Therapist for safe discharge planning        Anshul Mensah, PT, DPT   Available via [employer]out  NPI # [id number]  [place] License - [id number]  9/17/7584

## 2022-05-23 NOTE — ASSESSMENT & PLAN NOTE
· Suspect to be multifactorial due to mild CHF exacerbation, COVID-19 infection, anxiety  · Patient also with chronic anemia, Hgb within baseline   · Afebrile, does not meet SIRs/sepsis criteria  Mild leukocytosis likely reactive with steroid use  · CT chest/abdomen/pelvis: Pulmonary edema likely secondary CHF  Severe cardiomegaly  Unchanged 42 mm ascending aortic aneurysm     · Currently at baseline/improved oxygen requirements at 6L trach mask  · See treatment plan as presented below

## 2022-05-23 NOTE — PLAN OF CARE
Problem: Potential for Falls  Goal: Patient will remain free of falls  Description: INTERVENTIONS:  - Educate patient/family on patient safety including physical limitations  - Instruct patient to call for assistance with activity   - Consult OT/PT to assist with strengthening/mobility   - Keep Call bell within reach  - Keep bed low and locked with side rails adjusted as appropriate  - Keep care items and personal belongings within reach  - Initiate and maintain comfort rounds  - Make Fall Risk Sign visible to staff  - Apply yellow socks and bracelet for high fall risk patients  - Consider moving patient to room near nurses station  Outcome: Progressing     Problem: PAIN - ADULT  Goal: Verbalizes/displays adequate comfort level or baseline comfort level  Description: Interventions:  - Encourage patient to monitor pain and request assistance  - Assess pain using appropriate pain scale  - Administer analgesics based on type and severity of pain and evaluate response  - Implement non-pharmacological measures as appropriate and evaluate response  - Consider cultural and social influences on pain and pain management  - Notify physician/advanced practitioner if interventions unsuccessful or patient reports new pain  Outcome: Progressing     Problem: INFECTION - ADULT  Goal: Absence or prevention of progression during hospitalization  Description: INTERVENTIONS:  - Assess and monitor for signs and symptoms of infection  - Monitor lab/diagnostic results  - Monitor all insertion sites, i e  I peripheral IV trach  - Monitor endotracheal if appropriate and nasal secretions for changes in amount and color  - [place] appropriate cooling/warming therapies per order  - Administer medications as ordered  - Instruct and encourage patient and family to use good hand hygiene technique  - Identify and instruct in appropriate isolation precautions for identified infection/condition  Outcome: Progressing  Goal: Absence of fever/infection during neutropenic period  Description: INTERVENTIONS:  - Monitor WBC    Outcome: Progressing     Problem: SAFETY ADULT  Goal: Patient will remain free of falls  Description: INTERVENTIONS:  - Educate patient/family on patient safety including physical limitations  - Instruct patient to call for assistance with activity   - Consult OT/PT to assist with strengthening/mobility   - Keep Call bell within reach  - Keep bed low and locked with side rails adjusted as appropriate  - Keep care items and personal belongings within reach  - Initiate and maintain comfort rounds  - Make Fall Risk Sign visible to staff  - Apply yellow socks and bracelet for high fall risk patients  - Consider moving patient to room near nurses station  Outcome: Progressing  Goal: Maintain or return to baseline ADL function  Description: INTERVENTIONS:  - Educate patient/family on patient safety including physical limitations  - Instruct patient to call for assistance with activity   - Consult OT/PT to assist with strengthening/mobility   - Keep Call bell within reach  - Keep bed low and locked with side rails adjusted as appropriate  - Keep care items and personal belongings within reach  - Initiate and maintain comfort rounds  - Make Fall Risk Sign visible to staff  - Apply yellow socks and bracelet for high fall risk patients  - Consider moving patient to room near nurses station  Outcome: Progressing  Goal: Maintains/Returns to pre admission functional level  Description: INTERVENTIONS:  - Perform BMAT or MOVE assessment daily    - Set and communicate daily mobility goal to care team and patient/family/caregiver  - Collaborate with rehabilitation services on mobility goals if consulted  - Perform Range of Motion 2 times a day  - Reposition patient every 2 hours    - Dangle patient 2 times a day  - Stand patient 2 times a day  - Ambulate patient 2 times a day  - Out of bed to chair 2 times a day   - Out of bed for meals 2 times a day  - Out of bed for toileting  - Record patient progress and toleration of activity level   Outcome: Progressing     Problem: DISCHARGE PLANNING  Goal: Discharge to home or other facility with appropriate resources  Description: INTERVENTIONS:  - Identify barriers to discharge w/patient and caregiver  - Arrange for needed discharge resources and transportation as appropriate  - Identify discharge learning needs (meds, wound care, etc )  - Arrange for interpretive services to assist at discharge as needed  - Refer to Case Management Department for coordinating discharge planning if the patient needs post-hospital services based on physician/advanced practitioner order or complex needs related to functional status, cognitive ability, or social support system  Outcome: Progressing     Problem: Knowledge Deficit  Goal: Patient/family/caregiver demonstrates understanding of disease process, treatment plan, medications, and discharge instructions  Description: Complete learning assessment and assess knowledge base    Interventions:  - Provide teaching at level of understanding  - Provide teaching via preferred learning methods  Outcome: Progressing     Problem: RESPIRATORY - ADULT  Goal: Achieves optimal ventilation and oxygenation  Description: INTERVENTIONS:  - Assess for changes in respiratory status  - Assess for changes in mentation and behavior  - Position to facilitate oxygenation and minimize respiratory effort  - Oxygen administered by appropriate delivery if ordered  - Initiate smoking cessation education as indicated  - Encourage broncho-pulmonary hygiene including cough, deep breathe, Incentive Spirometry  - Assess the need for suctioning and aspirate as needed  - Assess and instruct to report SOB or any respiratory difficulty  - Respiratory Therapy support as indicated  Outcome: Progressing

## 2022-05-23 NOTE — ASSESSMENT & PLAN NOTE
Lab Results   Component Value Date    HGBA1C 12 7 (H) 05/22/2022       Recent Labs     05/22/22  2046 05/23/22  0240 05/23/22  0813 05/23/22  1123   POCGLU 412* 223* 274* 344*       Blood Sugar Average: Last 72 hrs:  · (P) 081 [id number]   Continues to have hyperglycemia, however currently on insulin at home, increase Lantus

## 2022-05-23 NOTE — PLAN OF CARE
Problem: OCCUPATIONAL THERAPY ADULT  Goal: Performs self-care activities at highest level of function for planned discharge setting  See evaluation for individualized goals  Description: Treatment Interventions: ADL retraining, Functional transfer training, UE strengthening/ROM, Endurance training, Cognitive reorientation, Patient/family training, Energy conservation, Equipment evaluation/education          See flowsheet documentation for full assessment, interventions and recommendations  Note: Limitation: Decreased UE strength, Decreased self-care trans, Decreased high-level ADLs, Decreased endurance  Prognosis: Fair  Assessment: Patient is a 55 y o  female seen for OT evaluation at [address] [place] [address] following admission on 5/19/2022  s/p Shortness of breath  Comorbidities impacting functional performance include: Dm2, hypokalemia, acute on chronic CHF, chronic hypoxemic respiratory failure, COVID-19, prolonged Q-T   Patient presents with active orders for OT eval and treat and Ambulate patient   Performed at least 2 patient identifiers during session including name and wristband  PTA, pt reports being (I) with ADLs, assistance with IADLs  Lives with son + daughter in 2 story home ith 4 [address] + full flight to 2nd floor bedroom  RW used at baseline Upon initial evaluation, patient is (I) for UB ADLs, supervision/set up for LB ADLs, and supervision for transfers and functional mobilitywith RW  Based on functional eval, patient presents A&Ox3 with intact  attention, intact  safety awareness, and intact  short term and long term memory   Occupational performance is affected by the following deficits: endurance ,  decreased muscular strength , decreased standing tolerance for self care tasks , decreased dynamic balance impacting functional reach and decreased activity tolerance  Based on these findings, functional performance deficits, and assessment findings, pt has been identified as a high complexity evaluation  Personal factors impacting performance at the time of evaluation include: decreased steps to enter home, FOS to second floor and decreased IADL independence  Patient would benefit from OT services to maximize level of functional independence and safety in self-care and transfers  Occupational areas to address include:bathing/showering, toileting, dressing , bed mobility , functional mobility, transfer to all surfaces, fall prevention  and energy conservation   From OT standpoint, recommendation at time of D/C would be return to previous environment with home health rehabilitation       OT Discharge Recommendation: Home with home health rehabilitation  OT - OK to Discharge:  (once medically clear)    Britney Madrid, OT

## 2022-05-23 NOTE — ASSESSMENT & PLAN NOTE
· Patient with mild symptoms, and currently at baseline oxygen requirement  However patient is high risk given presence of tracheostomy, CHF and chronic respiratory failure, will treat per protocol  · COVID pathway:  · C/w Remdesivir (Day 3/5)  · S/p 1x dose 40mg IV SoluMedrol, will D/C while patient remains on baseline/improved home O2 requirements and hyperglycemic  · Inflammatory markers:  · BNP: 454  · Trop: 20->24->23, CK:37, CRP: 31 7->24 4, Procal: 0 09->0  08   · D-dimer: 0 67  · AC: C/w home Eliquis 5mg PO BID  · Monitor respiratory status notify provider for increasing oxygen requirements  Received remdesivir and steroids, steroids discontinued early due to hyperglycemia  Patient has improved oxygen requirements and hence she does not require both remdesivir and steroids at this time

## 2022-05-23 NOTE — INCIDENTAL FINDINGS
The following findings require follow up:  Radiographic finding   Finding: Unchanged 42 mm ascending aortic aneurysm     Follow up required: yes   Follow up should be done within 1 month    Please notify the following clinician to assist with the follow up:   Dr Greenberg Going

## 2022-05-24 ENCOUNTER — TRANSITIONAL CARE MANAGEMENT (OUTPATIENT)
Dept: FAMILY MEDICINE CLINIC | Facility: CLINIC | Age: 57
End: 2022-05-24

## 2022-05-24 LAB — MRSA NOSE QL CULT: NORMAL

## 2022-05-25 NOTE — UTILIZATION REVIEW
Initial Clinical Review    Admission: Date/Time/Statement: OBS [email or web address] UPGRADED TO INPT [email or web address] D/T PERSISTENT SOB S/T ACUTE ON CHRONIC COMBINED SYSTOLIC AND DIASTOLIC CHF WITH COVID 19 INFECTION AND MUCUS PLUGGING OF TRACH COLLAR  Admission Orders (From admission, onward)     Ordered        05/20/22 1431  Inpatient Admission  Once            05/19/22 2232  Place in Observation  Once                      Orders Placed This Encounter                                               Inpatient Admission     Standing Status:   Standing     Number of Occurrences:   1     Order Specific Question:   Level of Care     Answer:   Med Surg [16]     Order Specific Question:   Estimated length of stay     Answer:   More than 2 Midnights     Order Specific Question:   Certification     Answer:   I certify that inpatient services are medically necessary for this patient for a duration of greater than two midnights  See H&P and MD Progress Notes for additional information about the patient's course of treatment  ED Arrival Information     Expected   -    Arrival   5/19/2022 18:03    Acuity   Urgent            Means of arrival   Ambulance    Escorted by   [facility]    Service   Hospitalist    Admission type   Urgent            Arrival complaint   [address]           Chief Complaint   Patient presents with    Shortness of Breath     Patient here via EMS with c/o SOB, has trach and feels like something is blocked while at home  Patient has life vest         · Initial Presentation: 55 y o  female to ED by ems admitted observation upgraded to inpt d/t sob s/t acute on chronic combined systolic and diastolic CHF exacerbation  PMH of type 2 DM, HF, chronic respiratory failure with tracheostomy  presents with SOB and left sided abdominal pain  increasing SOB over the past week made worse with exertion and lying flat  Also with increased coughing and secretions from her tracheostomy site   seen in the ED on 05/11 for same and found to have mucus plugging in her trach  Again with mucus plug tonight  Patient also tested positive for COVID-19  CT shows pulmonary edema likely secondary CHF  Severe cardiomegaly  Unchanged 42 mm ascending aortic aneurysm  Currently at baseline oxygen requirements  , Trop 20 --> 24  CRP 31 7  CK 37  IV lasix, IV Solu-Medrol and remdesivir  BB I&Os, daily weights, fluid restriction  SSI coverage with Accu-Cheks ACHS  · 5/20:UPGRADED TO INPT  still complains of shortness of breath, denies any chest pain  Complains of intermittent cough  requires further diuresis  IV lasix, IV Solu-Medrol and remdesivir  BB I&Os, daily weights, fluid restriction  · Date: 5/21   Day 2:  reports increased SOB this morning  does report occasional thick mucus plugs expectorated from tracheostomy  diffuse rales on auscultation of lungs  requires further diuresis  increase Lantus to 12 units q h s , Humalog to 7 units TID with meals  IV lasix, IV Solu-Medrol and remdesivir  BB I&Os, daily weights, fluid restriction        ED Triage Vitals   Temperature Pulse Respirations Blood Pressure SpO2   05/19/22 1807 05/19/22 1807 05/19/22 1807 05/19/22 1807 05/19/22 1807   98 8 °F (37 1 °C) 79 18 115/94 100 %      Temp Source Heart Rate Source Patient Position - Orthostatic VS BP Location FiO2 (%)   05/19/22 1807 05/19/22 1807 05/19/22 1807 05/19/22 1807 05/19/22 1834   Oral Monitor Sitting Left arm 40      Pain Score       05/19/22 2300       No Pain          Wt Readings from Last 1 Encounters:   05/23/22 82 4 kg (181 lb 10 5 oz)     Additional Vital Signs:   05/23/22 0956 -- -- -- -- -- 100 % 28 6 L/min Trach mask --   05/23/22 0810 97 5 °F (36 4 °C) 53 Abnormal  15 124/78 -- 100 % -- -- -- Lying   05/22/22 2100 98 °F (36 7 °C) 62 18 143/66 -- 100 % -- 6 L/min Trach mask Lying   05/22/22 2030 -- -- -- -- -- 100 % -- -- -- --   05/22/22 1959 -- -- -- -- -- 99 % 28 6 L/min Trach mask --   05/22/22 1700 97 5 °F (36 4 °C) 57 16 130/82 102 100 % -- -- Trach mask Lying   05/22/22 1456 -- -- -- -- -- 100 % 25 6 L/min Trach mask --   05/22/22 0920 -- 60 -- -- -- -- -- -- -- --   05/22/22 0849 -- -- -- -- -- 97 % 30 8 L/min Trach mask --   05/21/22 2108 -- -- -- -- -- 98 % 30 8 L/min Trach mask --   05/21/22 2015 98 °F (36 7 °C) 73 16 164/88 -- 96 % -- 8 L/min Trach mask Lying   05/21/22 1634 98 8 °F (37 1 °C) 67 20 155/86 -- 96 % 30 8 L/min Trach mask Lying   05/21/22 1459 -- -- -- -- -- 91 % -- -- -- --   05/21/22 0900 -- -- -- -- -- 97 % 30 8 L/min Trach mask --   05/21/22 0805 98 2 °F (36 8 °C) 57 20 143/83 -- 99 % -- 10 L/min Trach mask Lying   05/21/22 0257 -- -- -- -- -- 100 % 35 10 L/min Trach mask      5/20:97 2-68-/% ON TRACH MASK    5/19:98 8-79-/% ON [facility] MASK      Pertinent Labs/Diagnostic Test Results:   5/19 EKG:  Sinus rhythm  Baseline wander  RBBB and LAFB  Lateral infarct, age indeterminate  Prolonged QT  Abnormal ECG        Confirmed by Mina Hooper ([id number]) on 5/20/2022 10:46:38 AM    5/21 EKG:  Normal sinus rhythm  Low voltage QRS  Right bundle branch block  Left anterior fascicular block  Possible Lateral infarct , age undetermined  Abnormal ECG  Confirmed by Mina Hooper ([id number]) on 5/22/2022 12:33:08 PM    CT chest abdomen pelvis wo contrast   Final Result by Janneth Hermosillo MD (05/19 2035)      Pulmonary edema likely secondary to CHF  Severe cardiomegaly  Unchanged 42 mm ascending aortic aneurysm      No acute findings in the abdomen or pelvis  The study was marked in [facility] for immediate notification              Workstation performed: [id number]           Results from last 7 days   Lab Units 05/19/22  1850   SARS-COV-2  Positive*     Results from last 7 days   Lab Units 05/23/22  0547 05/22/22  0459 05/21/22  0436 05/20/22  0449 05/19/22  1848   WBC Thousand/uL 10 78* 12 26* 9 03 8 13 8 41   HEMOGLOBIN g/dL 10 1* 9 8* 9 1* 9 5* 10 1*   HEMATOCRIT % 32 7* 31 3* 30 2* 31 2* 33 1*   PLATELETS Thousands/uL 413* 412* 400* 395* 409*   NEUTROS ABS Thousands/µL 6 70 9 54* 6 41 5 19 6 30         Results from last 7 days   Lab Units 05/23/22  0547 05/22/22  0459 05/21/22 2103 05/21/22  0436 05/19/22  1848   SODIUM mmol/L 139 137 132* 141 134*   POTASSIUM mmol/L 4 0 3 1* 4 0 3 3* 3 2*   CHLORIDE mmol/L 103 99 96 104 100   CO2 mmol/L 29 30 26 27 26   ANION GAP mmol/L 7 8 10 10 8   BUN mg/dL 19 17 19 17 15   CREATININE mg/dL 0 81 0 81 1 03 0 76 0 84   EGFR ml/min/1 73sq m 82 82 61 88 78   CALCIUM mg/dL 8 4 8 7 8 8 7 8* 8 3*   MAGNESIUM mg/dL 1 8* 1 9  --  1 6* 1 6*     Results from last 7 days   Lab Units 05/22/22  0459 05/19/22  1848   AST U/L 7* 7*   ALT U/L 5* 7   ALK PHOS U/L 77 94   TOTAL PROTEIN g/dL 6 6 6 8   ALBUMIN g/dL 3 1* 3 3*   TOTAL BILIRUBIN mg/dL 0 47 0 53   BILIRUBIN DIRECT mg/dL  --  0 18     Results from last 7 days   Lab Units 05/23/22  1123 05/23/22  0813 05/23/22  0240 05/22/22  2046 05/22/22  1621 05/22/22  1200 05/22/22  0924 05/22/22  0446 05/22/22  0124 05/21/22  2018 05/21/22  1557 05/21/22  1231   POC GLUCOSE mg/dl 344* 274* 223* 412* 248* 137 174* 455* 409* >500* 421* 384*     Results from last 7 days   Lab Units 05/23/22  0547 05/22/22  0459 05/21/22 2103 05/21/22  0436 05/19/22  1848   GLUCOSE RANDOM mg/dL 209* 303* 531* 204* 474*         Results from last 7 days   Lab Units 05/22/22  0459   HEMOGLOBIN A1C % 12 7*   EAG mg/dl 318       Results from last 7 days   Lab Units 05/19/22  1848   CK TOTAL U/L 37     Results from last 7 days   Lab Units 05/20/22  0239 05/19/22  2351 05/19/22  2136   HS TNI 0HR ng/L  --   --  20   HS TNI 2HR ng/L  --  24  --    HSTNI D2 ng/L  --  4  --    HS TNI 4HR ng/L 23  --   --    HSTNI D4 ng/L 3  --   --      Results from last 7 days   Lab Units 05/20/22  0449   D-DIMER QUANTITATIVE ug/ml FEU 0 67*             Results from last 7 days   Lab Units 05/22/22  0459 05/19/22  1848   PROCALCITONIN ng/ml 0 08 0 09 Results from last 7 days   Lab Units 05/19/22  2136   BNP pg/mL 454*       Results from last 7 days   Lab Units 05/19/22  1848   LIPASE u/L 9*     Results from last 7 days   Lab Units 05/22/22  0459 05/19/22  1848   CRP mg/L 24 4* 31 7*       Results from last 7 days   Lab Units 05/19/22  1850   INFLUENZA A PCR  Negative   INFLUENZA B PCR  Negative   RSV PCR  Negative       ED Treatment:   Medication Administration from 05/19/2022 1802 to 05/19/2022 2256       Date/Time Order Dose Route Action Action by Comments     05/19/2022 2042 potassium chloride oral solution 20 mEq 20 mEq Oral Given       05/19/2022 2039 ketorolac (TORADOL) injection 15 mg 15 mg Intravenous Given       05/19/2022 2040 Lidocaine Viscous HCl (XYLOCAINE) 2 % mucosal solution 10 mL 10 mL Swish & Swallow Given          Past Medical History:   Diagnosis Date    Anxiety     Aortic aneurysm (HCC)     Arthritis     Depression     Diabetes mellitus (HCC)     Fibromyalgia     GERD (gastroesophageal reflux disease)     GERD (gastroesophageal reflux disease)     H/O cardiovascular stress test 09/2018    no ischemia  EF 70%   H/O echocardiogram 01/2019    EF 60%  Mild LVH  trivial effusion   Hyperlipidemia     Hypertension     Migraines     Psychiatric disorder     anxiety    Uncontrolled hypertension 2/25/2015    Last Assessment & Plan:  BP today above goal <140/90, apparently asymptomatic  Prior BP elevations were attributed to not taking medication  Consider increased medication if persistent on f/u  Present on Admission:   Acute on chronic combined systolic and diastolic heart failure (HCC)   Chronic hypoxemic respiratory failure (HCC)   Hypokalemia      Admitting Diagnosis: Shortness of breath [R06 02]  Pulmonary edema [J81 1]  Sore throat [J02 9]  Dyspnea [R06 00]  Abdominal pain [R10 9]  [facility] [Z43 0]  Elevated brain natriuretic peptide (BNP) level [R79 89]  COVID [U07 1]  Age/Sex: 55 y  o  female  Admission Orders:  Scheduled Medications:  amiodarone tablet 100 mg  Dose: 100 mg  Freq: Daily with breakfast Route: PO  Indications of Use: VENTRICULAR TACHYCARDIA  Start: 05/20/22 0730 End: 05/23/22 1644   Admin Instructions: Take with food or milk  5329      3574      0919      1056     1644-D/C'd      apixaban (ELIQUIS) tablet 5 mg  Dose: 5 mg  Freq: 2 times daily Route: PO  Start: 05/20/22 0900 End: 05/23/22 1644   Admin Instructions:   High alert medication   Order specific questions:   Indication: non-valvular atrial fibrillation          0807     1734      0835     1718      0914     1708      1056     1644-D/C'd      atorvastatin (LIPITOR) tablet 40 mg  Dose: 40 mg  Freq: Daily before dinner Route: PO  Start: 05/20/22 1600 End: 05/23/22 1644          1734      1631      1618      1644-D/C'd      escitalopram (LEXAPRO) tablet 10 mg  Dose: 10 mg  Freq: Daily Route: PO  Start: 05/20/22 0900 End: 05/20/22 0201   Admin Instructions:   [employer]          0201-D/C'd         famotidine (PEPCID) oral suspension 20 mg  Dose: 20 mg  Freq: 2 times daily Route: PO  Start: 05/20/22 0900 End: 05/23/22 1644          0815     1734      0930     1718      0914     1708      1055     1644-D/C'd      furosemide (LASIX) injection 40 mg  Dose: 40 mg  Freq: 2 times daily Route: IV  Start: 05/20/22 0015 End: 05/23/22 1644   Admin Instructions:   Administer slowly 20 mg per minute  Administer slowly 20 mg per minute  [employer] [employer]   Order specific questions:   Hold for systolic blood pressure less than (mmHg) 110          0052     0813     1734      0835     1718      0920     1708      1057     1644-D/C'd      guaiFENesin (MUCINEX) 12 hr tablet 600 mg  Dose: 600 mg  Freq: Every 12 hours scheduled Route: PO  Start: 05/20/22 0215 End: 05/23/22 1644   Admin Instructions:   Do not crush, chew or spilt tablets            [address]     2014      3305 [phone]     1644-D/C'd      insulin glargine (LANTUS) subcutaneous injection 12 Units 0 12 mL  Dose: 12 Units  Freq: Daily at bedtime Route: SC  Start: 05/21/22 2200 End: 05/23/22 1644   Admin Instructions:   HIGH ALERT MEDICATION  Do not dilute/mix insulin glargine with any other insulin formulation/solution  **DISPOSE IN 8 G[employer] CONTAINER** Do not hold medication without a physician order  2202 2107      1644-D/C'd      insulin glargine (LANTUS) subcutaneous injection 8 Units 0 08 mL  Dose: 8 Units  Freq: Daily at bedtime Route: SC  Start: 05/19/22 2345 End: 05/21/22 1423   Admin Instructions:   HIGH ALERT MEDICATION  Do not dilute/mix insulin glargine with any other insulin formulation/solution  **DISPOSE IN 8 GALL[employer] BL[employer] CONTAINER** Do not hold medication without a physician order  0019 2054           1423-D/C'd        insulin lispro (HumaLOG) 100 units/mL subcutaneous injection 1-6 Units  Dose: 1-6 Units  Freq: Daily  (0200) Route: SC  Start: 05/20/22 0200 End: 05/23/22 1644   Admin Instructions:   Algorithm 3    Blood Glucose 150 - 189: 1 Unit of Insulin  Blood Glucose 190 - 229: 2 Units of Insulin  Blood Glucose 230 - 269: 3 Units of Insulin  Blood Glucose 270 - 309: 4 Units of Insulin  Blood Glucose 310 - 349: 5 Units of Insulin  Blood Glucose greater than or equal to 350: 6 Units of Insulin  HIGH ALERT MEDICATION  **DISPOSE IN 8 [employer] BL[employer] CONTAINER**   [employer]          0227      0136      (7578) [C]      0222     1644-D/C'd      insulin lispro (HumaLOG) 100 units/mL subcutaneous injection 1-6 Units  Dose: 1-6 Units  Freq: Daily at bedtime Route: SC  Start: 05/19/22 2345 End: 05/23/22 1644   Admin Instructions:   Algorithm 3    Blood Glucose 150 - 189: 1 Unit of Insulin  Blood Glucose 190 - 229: 2 Units of Insulin  Blood Glucose 230 - 269: 3 Units of Insulin  Blood Glucose 270 - 309: 4 Units of Insulin  Blood Glucose 310 - 349: 5 Units of Insulin  Blood Glucose greater than or equal to 350: 6 Units of Insulin  HIGH ALERT MEDICATION  **DISPOSE IN 8 G[employer] BL[employer] CONTAINER**  L[employer] ALIKE MED          0018 [C]     2059 (9388) [C]      2108) [C]     2110 [C]      1644-D/C'd      insulin lispro (HumaLOG) 100 units/mL subcutaneous injection 1-6 Units  Dose: 1-6 Units  Freq: 3 times daily before meals Route: SC  Start: 05/20/22 0700 End: 05/23/22 1644   Admin Instructions:   Algorithm 3    Blood Glucose 150 - 189: 1 Unit of Insulin  Blood Glucose 190 - 229: 2 Units of Insulin  Blood Glucose 230 - 269: 3 Units of Insulin  Blood Glucose 270 - 309: 4 Units of Insulin  Blood Glucose 310 - 349: 5 Units of Insulin  Blood Glucose greater than or equal to 350: 6 Units of Insulin  HIGH ALERT MEDICATION  **DISPOSE IN 8 [employer] BL[employer] CONTAINER**  [employer]          (4894) [phone] [C]     (7182) [C]     1622 [C]      1056     1330     1644-D/C'd      insulin lispro (HumaLOG) 100 units/mL subcutaneous injection 3 Units  Dose: 3 Units  Freq: Daily with dinner Route: SC  Start: 05/20/22 1630 End: 05/21/22 1423   Admin Instructions:   HIGH ALERT MEDICATION  **DISPOSE IN 8 [employer] BL[employer] CONTAINER**  [employer]          1727      1423-D/C'd        insulin lispro (HumaLOG) 100 units/mL subcutaneous injection 3 Units  Dose: 3 Units  Freq: Daily with lunch Route: SC  Start: 05/20/22 1200 End: 05/21/22 1423   Admin Instructions:   HIGH ALERT MEDICATION  **DISPOSE IN 8 G[employer] BL[employer] CONTAINER**  [employer]          7199      3639     1423-D/C'd        insulin lispro (HumaLOG) 100 units/mL subcutaneous injection 3 Units  Dose: 3 Units  Freq: Daily with breakfast Route: SC  Start: 05/20/22 0730 End: 05/21/22 1423   Admin Instructions:   HIGH ALERT MEDICATION  **DISPOSE IN 8 G[employer] BL[employer] CONTAINER**   [employer]          [id number]      5924     1423-D/C'd        insulin lispro (HumaLOG) 100 units/mL subcutaneous injection 5 Units  Dose: 5 Units  Freq: 3 times daily with meals Route: SC  Start: 05/22/22 1830 End: 05/23/22 1644   Admin Instructions:   HIGH ALERT MEDICATION  **DISPOSE IN 8 G[employer] BLACK CONTAINER**  [employer] ALI[employer]            2052      1053     1330     1644-D/C'd      insulin lispro (HumaLOG) 100 units/mL subcutaneous injection 6 Units  Dose: 6 Units  Freq: Once Route: SC  Start: 05/20/22 0230 End: 05/20/22 0228   Admin Instructions:   HIGH ALERT MEDICATION  **DISPOSE IN 8 G[employer] BL[employer] CONTAINER**  [employer]          6701 [C]           insulin lispro (HumaLOG) 100 units/mL subcutaneous injection 7 Units  Dose: 7 Units  Freq: 3 times daily with meals Route: SC  Start: 05/21/22 1630 End: 05/22/22 1207   Admin Instructions:   HIGH ALERT MEDICATION  **DISPOSE IN 8 G[employer] BL[employer] CONTAINER**  [employer]           1687 5144 [C]     1230) [C]     1207-D/C'd       insulin regular (HumuLIN R,NovoLIN R) injection 10 Units  Dose: 10 Units  Freq: Once Route: [facility]  Start: 05/22/22 0530 End: 05/22/22 0517   Admin Instructions:   High Alert Medication; **DISPOSE IN 8 G[employer] BL[employer] CONTAINER**  [employer]             7757         insulin regular (HumuLIN R,NovoLIN R) injection 10 Units  Dose: 10 Units  Freq: Once Route: [facility]  Start: 05/22/22 0145 End: 05/22/22 0200   Admin Instructions:   High Alert Medication; **DISPOSE IN 8 GALL[employer] BL[employer] CONTAINER**  [employer]             0200         insulin regular (HumuLIN R,NovoLIN R) injection 10 Units  Dose: 10 Units  Freq: Once Route: [facility]  Start: 05/21/22 2145 End: 05/21/22 2202   Admin Instructions:   High Alert Medication; **DISPOSE IN 8 GALL[employer] BL[employer] CONTAINER**  [employer]            3766          insulin regular (HumuLIN R,NovoLIN R) injection 5 Units  Dose: 5 Units  Freq:  Once Route: [facility]  Start: 05/20/22 0030 End: 05/20/22 0052   Admin Instructions: High Alert Medication; **DISPOSE IN 8 G[employer] BL[employer] CONTAINER**  [employer]           0052           ipratropium (ATROVENT) 0 02 % inhalation solution 0 5 mg  Dose: 0 5 mg  Freq: 3 times daily (RESP) Route: NEBULIZATION  Start: 05/20/22 0015 End: 05/23/22 1644          0052     0756     1436     2022      0900     1458     2107      0849     1454     1959      0955 [C]     1400     1644-D/C'd      ipratropium (ATROVENT) 0 02 % inhalation solution 0 5 mg  Dose: 0 5 mg  Freq: 3 times daily (RESP) Route: NEBULIZATION  Start: 05/20/22 0800 End: 05/20/22 0012 0012-D/C'd         ketorolac (TORADOL) injection 15 mg  Dose: 15 mg  Freq: Once Route: IV  Start: 05/19/22 2030 End: 05/19/22 2039   Admin Instructions:   [employer]         2039            levalbuterol (Kianna Rad) inhalation solution 1 25 mg  Dose: 1 25 mg  Freq: 3 times daily (RESP) Route: NEBULIZATION  Start: 05/20/22 0015 End: 05/23/22 1644          0515     2352     7978     7323      0900     1458     2107      0849     1454     1959      0955 [C]     1400     1644-D/C'd      levalbuterol (XOPENEX) inhalation solution 1 25 mg  Dose: 1 25 mg  Freq: 3 times daily (RESP) Route: NEBULIZATION  Start: 05/20/22 0800 End: 05/20/22 0012 0012-D/C'd         Lidocaine Viscous HCl (XYLOCAINE) 2 % mucosal solution 10 mL  Dose: 10 mL  Freq: Once Route: SWISH [facility] SWAL  Start: 05/19/22 2030 End: 05/19/22 2040 2040            losartan (COZAAR) tablet 50 mg  Dose: 50 mg  Freq: Daily Route: PO  Start: 05/20/22 0900 End: 05/23/22 1644   Order specific questions:   Hold for systolic blood pressure less than (mmHg) 110          0808      0835      0920      1056     1644-D/C'd      magnesium sulfate 2 g/50 mL IVPB (premix) 2 g  Dose: 2 g  Freq: Once Route: IV  Start: 05/21/22 1200 End: 05/21/22 1435   Admin Instructions:   High alert medication             1235          magnesium sulfate IVPB (premix) SOLN 1 g  Dose: 1 g  Freq: Once Route: IV  Last Dose: 1 g (05/22/22 0912)  Start: 05/22/22 0830 End: 05/22/22 1012   Admin Instructions:   High-alert medication! 0912         melatonin tablet 6 mg  Dose: 6 mg  Freq: Daily at bedtime Route: PO  Start: 05/20/22 0015 End: 05/23/22 1644          0050     2106      2214      2107      1644-D/C'd      methylPREDNISolone sodium succinate (Solu-MEDROL) injection 40 mg  Dose: 40 mg  Freq: Every 24 hours Route: IV  Start: 05/21/22 0700 End: 05/22/22 0854           0843      0854-D/C'd  (0905)         methylPREDNISolone sodium succinate (Solu-MEDROL) injection 40 mg  Dose: 40 mg  Freq: Every 24 hours Route: IV  Start: 05/20/22 0015 End: 05/20/22 0035          0035-D/C'd  (0042)           metoprolol tartrate (LOPRESSOR) tablet 25 mg  Dose: 25 mg  Freq: Every 12 hours scheduled Route: PO  Start: 05/20/22 0015 End: 05/23/22 1644   Admin Instructions:   Hold for heart rate less than 50 beats per minute  [employer]   Order specific questions:   Hold for systolic blood pressure less than (mmHg) 110          0050     0828     2053      0835     2014 0920 2050      1056     1644-D/C'd      polyethylene glycol (MIRALAX) packet 17 g  Dose: 17 g  Freq: Daily Route: PO  Start: 05/20/22 0900 End: 05/23/22 1644   Admin Instructions:   Stir powder in 4-8 ounces of water, juice, soda, coffee or tea until dissolved and drink  [employer]          (3071) (2743) (8729) (5352)     1644-D/C'd      potassium chloride (K-DUR,KLOR-CON) CR tablet 40 mEq  Dose: 40 mEq  Freq: Once Route: PO  Start: 05/21/22 0830 End: 05/21/22 0857   Admin Instructions:   Swallow whole; do not crush or chew  Tablet may be split in half to facilitate swallowing            (0024)     0857-D/C'd        potassium chloride oral solution 20 mEq  Dose: 20 mEq  Freq:  Once Route: PO  Start: 05/19/22 1930 End: 05/19/22 2042   Admin Instructions:   **DISPOSE IN 8 GALL[employer] BLACK CONTAINER** 2042            potassium chloride oral solution 40 mEq  Dose: 40 mEq  Freq: 2 times daily Route: PO  Start: 05/22/22 0900 End: 05/22/22 1708   Admin Instructions:   **DISPOSE IN 8 GALLON BLACK CONTAINER**            0916     1708         potassium chloride oral solution 40 mEq  Dose: 40 mEq  Freq: Once Route: PO  Start: 05/21/22 1100 End: 05/21/22 1236   Admin Instructions:   **DISPOSE IN 8 GALLON BLACK CONTAINER**           1236          pregabalin (LYRICA) capsule 75 mg  Dose: 75 mg  Freq: Daily Route: PO  Start: 05/20/22 0900 End: 05/23/22 1644   Admin Instructions:   High Alert Medication  [employer]          7676      5023      5791      0446     1644-D/C'd      remdesivir (Veklury) 200 mg in sodium chloride 0 9 % 290 mL IVPB  Dose: 200 mg  Freq: Every 24 hours Route: IV  Indications of Use: INFECTION CAUSED BY 2019 NOVEL CORONAVIRUS  Start: 05/20/22 0100 End: 05/20/22 0128   Admin Instructions:   After the infusion is complete, flush the IV line with at least 30 mL of 0 9% saline at the same infusion rate to ensure that all the remdesivir solution has been administered  Order specific questions:   O2 requirement? 1-15 LPM          0058           Followed by  remdesivir Thermon Blase) 100 mg in sodium chloride 0 9 % 270 mL IVPB  Dose: 100 mg  Freq: Every 24 hours Route: IV  Indications of Use: INFECTION CAUSED BY 2019 NOVEL CORONAVIRUS  Start: 05/21/22 0100 End: 05/23/22 1644   Admin Instructions:   After the infusion is complete, flush the IV line with at least 30 mL of 0 9% saline at the same infusion rate to ensure that all the remdesivir solution has been administered  Order specific questions:   O2 requirement?  1-15 LPM           0010      0114      0030     1644-D/C'd      spironolactone (ALDACTONE) tablet 100 mg  Dose: 100 mg  Freq: Daily Route: PO  Start: 05/20/22 0900 End: 05/23/22 1644   Order specific questions:   Hold for systolic blood pressure less than (mmHg) 110 5987      0835      0920      1056     1644-D/C'd      ticagrelor (BRILINTA) tablet 90 mg  Dose: 90 mg  Freq: Every 12 hours scheduled Route: PO  Indications of Use: ACUTE CORONARY SYNDROME,CORONARY ARTERY DISEASE,MYOCARDIAL INFARCTION,PERCUTANEOUS CORONARY INTERVENTION  Start: 05/20/22 0015 End: 05/23/22 1644                        PRN Meds:  acetaminophen (TYLENOL) tablet 650 mg  Dose: 650 mg  Freq: Every 6 hours PRN Route: PO  PRN Reasons: mild pain,headaches,fever  Start: 05/22/22 1204 End: 05/23/22 1644            1216     1826      0611     1644-D/C'd      benzonatate (TESSALON PERLES) capsule 100 mg  Dose: 100 mg  Freq: 3 times daily PRN Route: PO  PRN Reason: cough  Start: 05/20/22 2347 End: 05/23/22 1644   Admin Instructions:   Do NOT crush          2357 2014       1644-D/C'd      LORazepam (ATIVAN) tablet 0 5 mg  Dose: 0 5 mg  Freq: Every 6 hours PRN Route: PO  PRN Reason: anxiety  Start: 05/20/22 1430 End: 05/23/22 1644   Admin Instructions:   High alert medication  LOOK ALIKE SOUND ALIKE MED          1423     2052      0930     1631      0942     1715      0011     1056     1644-D/C'd      LORazepam (ATIVAN) tablet 0 5 mg  Dose: 0 5 mg  Freq: 2 times daily PRN Route: PO  PRN Reason: anxiety  Start: 05/20/22 0011 End: 05/20/22 1418   Admin Instructions:   High alert medication  LOOK ALIKE SOUND ALIKE MED          4822     1418-D/C'd                                     OBS:  AMBULATE  SCD  OOB  I&O  TRACH CARE  CONTACT AND AIRBORNE ISOLATION  DAILY WEIGHTS      Network Utilization Review Department  ATTENTION: Please call with any questions or concerns to [phone] and carefully listen to the prompts so that you are directed to the right person  All voicemails are confidential   Tete Blackwood all requests for admission clinical reviews, approved or denied determinations and any other requests to dedicated fax number below belonging to the campus where the patient is receiving treatment   List of dedicated fax numbers for the Facilities:  [address] [place] [address] DENIALS (Administrative/Medical Necessity) [phone]   [address]  (Maternity/NICU/Pediatrics) [address] [facility],[address]  [address] [facility] [place] [address] [address] [place] [address] Samara Phuong Velázquez 1481 [address] [facility] [address] [phone]

## 2022-05-27 ENCOUNTER — TELEPHONE (OUTPATIENT)
Dept: FAMILY MEDICINE CLINIC | Facility: CLINIC | Age: 57
End: 2022-05-27

## 2022-05-31 ENCOUNTER — PATIENT OUTREACH (OUTPATIENT)
Dept: FAMILY MEDICINE CLINIC | Facility: CLINIC | Age: 57
End: 2022-05-31

## 2022-06-02 ENCOUNTER — HOSPITAL ENCOUNTER (EMERGENCY)
Facility: HOSPITAL | Age: 57
Discharge: HOME | End: 2022-06-02
Attending: EMERGENCY MEDICINE | Admitting: EMERGENCY MEDICINE
Payer: COMMERCIAL

## 2022-06-02 ENCOUNTER — APPOINTMENT (EMERGENCY)
Dept: RADIOLOGY | Facility: HOSPITAL | Age: 57
End: 2022-06-02
Payer: COMMERCIAL

## 2022-06-02 ENCOUNTER — APPOINTMENT (EMERGENCY)
Dept: CT IMAGING | Facility: HOSPITAL | Age: 57
End: 2022-06-02
Payer: COMMERCIAL

## 2022-06-02 VITALS
SYSTOLIC BLOOD PRESSURE: 173 MMHG | OXYGEN SATURATION: 100 % | DIASTOLIC BLOOD PRESSURE: 92 MMHG | RESPIRATION RATE: 22 BRPM | HEART RATE: 61 BPM

## 2022-06-02 DIAGNOSIS — R09.89 GLOBUS SENSATION: ICD-10-CM

## 2022-06-02 DIAGNOSIS — I71.2 ASCENDING AORTIC ANEURYSM (HCC): ICD-10-CM

## 2022-06-02 DIAGNOSIS — J39.2: Primary | ICD-10-CM

## 2022-06-02 DIAGNOSIS — J12.9 VIRAL PNEUMONIA: ICD-10-CM

## 2022-06-02 PROCEDURE — 71046 X-RAY EXAM CHEST 2 VIEWS: CPT

## 2022-06-02 PROCEDURE — 99284 EMERGENCY DEPT VISIT MOD MDM: CPT | Performed by: EMERGENCY MEDICINE

## 2022-06-02 PROCEDURE — G1004 CDSM NDSC: HCPCS

## 2022-06-02 PROCEDURE — 70490 CT SOFT TISSUE NECK W/O DYE: CPT

## 2022-06-02 PROCEDURE — 99284 EMERGENCY DEPT VISIT MOD MDM: CPT

## 2022-06-02 NOTE — ED PROVIDER NOTES
History  Chief Complaint   Patient presents with    Foreign Body in Throat     Pt with trach states that she had a few choking episodes over last couple of days, feels that there is food stuck in throat     Patient is a 64-year-old female who presents today with a feeling of globus in her throat  She reports the feeling is been there for a few days after she was eating rice  Patient has not had any nausea, vomiting, chest pain, patient has some shortness of breath that she reports is worse than her baseline shortness of breath from COPD  Patient has had a cough with increased production of sputum  Patient has had no headache, dizziness, abdominal pain, urinary or GI complaints  Patient was recently admitted for similar complaints but was not found to have anything in her GI tract  Patient currently has a tracheostomy in place due to respiratory failure after heart attack  Patient drinks occasionally, does not smoke use drugs  Prior to Admission Medications   Prescriptions Last Dose Informant Patient Reported? Taking?    Benzocaine-Menthol 15-2 6 MG LOZG   No No   Sig: Apply 1 lozenge to the mouth or throat 4 (four) times a day as needed (sore throat)   Blood Pressure Monitoring (Sphygmomanometer) MISC  Self No No   Sig: Use 2 (two) times a day   Patient not taking: Reported on 2021   Insulin Pen Needle (Novofine Pen Needle) 32G X 6 MM MISC   No No   Sig: Use 3 (three) times a day with meals   Insulin Pen Needle (UNIFINE PENTIPS PLUS) 31G X 6 MM MISC  Self Yes No   Si Device by Does not apply route 4 (four) times a day   LORazepam (Ativan) 1 mg tablet   No No   Sig: Take 0 5 tablets (0 5 mg total) by mouth 2 (two) times a day as needed for anxiety   Lancets MISC  Self Yes No   Si Device by Does not apply route 4 (four) times a day   Patient not taking: Reported on 2021   acetaminophen (TYLENOL) 160 mg/5 mL suspension   No No   Si 4 mL (975 mg total) by Per G Tube route every 6 (six) hours as needed for mild pain or fever   albuterol (2 5 mg/3 mL) 0 083 % nebulizer solution   No No   Sig: Take 3 mL (2 5 mg total) by nebulization every 4 (four) hours as needed for wheezing or shortness of breath   amiodarone 100 mg tablet   No No   Sig: Take 1 tablet (100 mg total) by mouth daily with breakfast   apixaban (ELIQUIS) 5 mg   No No   Sig: Take 1 tablet (5 mg total) by mouth 2 (two) times a day   atorvastatin (LIPITOR) 40 mg tablet   No No   Sig: Take 1 tablet (40 mg total) by mouth daily   chlorhexidine (PERIDEX) 0 12 % solution   No No   Sig: Apply 15 mL to the mouth or throat every 12 (twelve) hours   escitalopram (LEXAPRO) 10 mg tablet   No No   Sig: TAKE 1 TABLET BY MOUTH EVERY DAY   famotidine (PEPCID) 20 mg/2 5 mL oral suspension   No No   Sig: Take 2 5 mL (20 mg total) by mouth 2 (two) times a day   furosemide (LASIX) 20 mg tablet   No No   Sig: Take 3 tablets (60 mg total) by mouth in the morning and 3 tablets (60 mg total) in the evening  insulin aspart (NovoLOG FlexPen) 100 UNIT/ML injection pen   No No   Sig: Inject 2 Units under the skin 3 (three) times a day with meals   insulin glargine (Basaglar KwikPen) 100 units/mL injection pen   No No   Sig: Inject 12 Units under the skin in the morning     ipratropium (ATROVENT) 0 02 % nebulizer solution   No No   Sig: Take 2 5 mL (0 5 mg total) by nebulization 3 (three) times a day   levalbuterol (XOPENEX) 1 25 mg/0 5 mL nebulizer solution   No No   Sig: Take 0 5 mL (1 25 mg total) by nebulization 3 (three) times a day   losartan (COZAAR) 50 mg tablet   No No   Sig: Take 1 tablet (50 mg total) by mouth daily   melatonin 3 mg   No No   Sig: Take 2 tablets (6 mg total) by mouth daily at bedtime   metoprolol tartrate (LOPRESSOR) 25 mg tablet   No No   Sig: Take 1 tablet (25 mg total) by mouth every 12 (twelve) hours   polyethylene glycol (MIRALAX) 17 g packet   No No   Sig: Take 17 g by mouth daily   potassium chloride 10% oral solution   No No   Sig: Take 15 mL (20 mEq total) by mouth 2 (two) times a day for 1 dose   pregabalin (LYRICA) 75 mg capsule  Self No No   Sig: TAKE ONE CAPSULE EVERY DAY   senna-docusate sodium (SENOKOT S) 8 6-50 mg per tablet   No No   Sig: Take 1 tablet by mouth daily at bedtime   spironolactone (ALDACTONE) 100 mg tablet   No No   Sig: Take 1 tablet (100 mg total) by mouth daily   ticagrelor (BRILINTA) 90 MG   No No   Sig: Take 1 tablet (90 mg total) by mouth every 12 (twelve) hours   traZODone (DESYREL) 50 mg tablet   Yes No   Sig: TAKE 0 5 TABLET (25MG) BY MOUTH NIGHTLY AS NEEDED FOR SLEEP  Facility-Administered Medications: None       Past Medical History:   Diagnosis Date    Anxiety     Aortic aneurysm (HCC)     Arthritis     Depression     Diabetes mellitus (HCC)     Fibromyalgia     GERD (gastroesophageal reflux disease)     GERD (gastroesophageal reflux disease)     H/O cardiovascular stress test 09/2018    no ischemia  EF 70%   H/O echocardiogram 01/2019    EF 60%  Mild LVH  trivial effusion   Hyperlipidemia     Hypertension     Migraines     Psychiatric disorder     anxiety    Uncontrolled hypertension 2/25/2015    Last Assessment & Plan:  BP today above goal <140/90, apparently asymptomatic  Prior BP elevations were attributed to not taking medication  Consider increased medication if persistent on f/u  Past Surgical History:   Procedure Laterality Date    BACK SURGERY      Lumbar epidural steroid injection    CARDIAC CATHETERIZATION N/A 10/22/2021    Procedure: Cardiac pci;  Surgeon: Melani Selby MD;  [facility]; Service: Cardiology    CARDIAC CATHETERIZATION  10/22/2021    Procedure: Cardiac catheterization;  Surgeon: Melani Selby MD;  [facility]; Service: Cardiology    CARDIAC CATHETERIZATION Left 10/27/2021    Procedure: Cardiac Left Heart Cath;  Surgeon: Mike Rain MD;  [facility];   Service: Cardiology    CARDIAC CATHETERIZATION N/A 10/27/2021    Procedure: Cardiac pci;  Surgeon: Luis Carlson MD;  [facility]; Service: Cardiology    CARDIAC CATHETERIZATION N/A 10/28/2021    Procedure: Cardiac pci;  Surgeon: Emilie Goldstein DO;  [facility]; Service: Cardiology    CARPAL TUNNEL RELEASE Left      SECTION      CHOLECYSTECTOMY      COLONOSCOPY      incomplete    COLONOSCOPY      EYE SURGERY      HYSTERECTOMY      Total    OVARIAN CYST REMOVAL      PEG W/TRACHEOSTOMY PLACEMENT N/A 2021    Procedure: TRACHEOSTOMY WITH INSERTION PEG TUBE;  Surgeon: Carlos Fung DO;  [facility];  Service: General    TUBAL LIGATION      UPPER GASTROINTESTINAL ENDOSCOPY         Family History   Problem Relation Age of Onset    Hypertension Mother    [facility] Arthritis Mother     Diabetes Father     Other Sister         renal cell carcinoma    Diabetes Other     Factor V Leiden deficiency Other      I have reviewed and agree with the history as documented  E-Cigarette/Vaping    E-Cigarette Use Never User      E-Cigarette/Vaping Substances    Nicotine No     THC No     CBD No     Flavoring No     Other No     Unknown No      Social History     Tobacco Use    Smoking status: Former Smoker     Packs/day: 1 00     Years: 30 00     Pack years: 30 00     Types: Cigarettes    Smokeless tobacco: Never Used   Vaping Use    Vaping Use: Never used   Substance Use Topics    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   Drug use: Yes     Types: Marijuana     Comment: medical; seldom use       Review of Systems   Constitutional: Negative for fever  HENT: Negative for congestion  Eyes: Negative for visual disturbance  Respiratory: Positive for cough and shortness of breath  Cardiovascular: Negative for chest pain  Gastrointestinal: Negative for abdominal pain, constipation, diarrhea, nausea and vomiting     Endocrine: Negative for polyuria  Genitourinary: Negative for dysuria and hematuria  Musculoskeletal: Negative for myalgias  Neurological: Positive for headaches  Negative for dizziness  Physical Exam  Physical Exam  Vitals and nursing note reviewed  Constitutional:       Appearance: Normal appearance  HENT:      Head: Normocephalic and atraumatic  Right Ear: External ear normal       Left Ear: External ear normal       Mouth/Throat:      Mouth: Mucous membranes are moist       Pharynx: Oropharynx is clear  Comments: Patient has no cervical lymphadenopathy, has symmetric elevation of the thyroid with swallowing, no thyroid nodules, no adventitious breath sounds on auscultation of the neck, no swelling, no abnormalities in the oropharynx  Eyes:      Extraocular Movements: Extraocular movements intact  Conjunctiva/sclera: Conjunctivae normal    Cardiovascular:      Rate and Rhythm: Normal rate and regular rhythm  Heart sounds: Normal heart sounds  Pulmonary:      Effort: Pulmonary effort is normal       Breath sounds: Rhonchi present  Abdominal:      General: Abdomen is flat  There is no distension  Palpations: Abdomen is soft  Musculoskeletal:         General: No deformity  Normal range of motion  Cervical back: Normal range of motion  Skin:     General: Skin is warm and dry  Neurological:      General: No focal deficit present  Mental Status: She is alert        Gait: Gait normal    Psychiatric:         Mood and Affect: Mood normal          Behavior: Behavior normal          Vital Signs  ED Triage Vitals   Temp Pulse Respirations Blood Pressure SpO2   -- 06/02/22 1417 06/02/22 1418 06/02/22 1417 06/02/22 1417    61 22 (!) 173/92 100 %      Temp src Heart Rate Source Patient Position - Orthostatic VS BP Location FiO2 (%)   -- 06/02/22 1417 -- -- --    Monitor         Pain Score       --                  Vitals:    06/02/22 1417   BP: (!) 173/92   Pulse: 61         Visual Acuity      ED Medications  Medications - No data to display    Diagnostic Studies  Results Reviewed     None                 XR chest 2 views   Final Result by Silvia Cisse DO (06/02 1701)      Mild residual interstitial edema suspected with groundglass density left midlung, possibly asymmetric alveolar edema and/or pneumonia (possibly from Covid-19)  Workstation performed: [id number]         CT soft tissue neck wo contrast   Final Result by Ev Dodson MD (06/02 1529)      Diffuse circumferential soft tissue thickening of posterior pharyngeal wall extending into the laryngeal airway with associated severe laryngeal airway narrowing above the tracheostomy tube which is in the upper trachea  Question chronic inflammatory    changes  Consider direct visualization by ENT for further evaluation  Findings suggestive of multifocal pneumonia (given positive history of Covid-19) on a background of pulmonary edema and chronic pulmonary cystic lung disease  Differential includes aspiration (given tracheostomy tube)  Mildly enlarged bilateral upper jugular chain cervical lymphadenopathy, likely reactive lymphadenopathy  Recommend clinical follow-up to resolution  Unchanged 4 2 cm aneurysm of ascending thoracic aorta  Additional chronic/incidental findings as detailed above  The study was marked in [facility] for immediate notification  Workstation performed: [id number]                    Procedures  Procedures         ED Course       MDM  Number of Diagnoses or Management Options  Ascending aortic aneurysm (HCC)  Globus sensation  Swelling of pharynx  Viral pneumonia  Diagnosis management comments: Patient's imaging shows a stable aortic aneurysm, swelling in her pharynx, no foreign bodies noted in the esophagus  Patient will be discharged home and will be encouraged to follow up with ENT for further evaluation of a globus sensation    Patient found to have evidence of a viral pneumonia consistent with her recent COVID infection as well  Return indications and follow-up instructions given  Disposition  Final diagnoses:   Swelling of pharynx   Viral pneumonia   Ascending aortic aneurysm (HCC)   Globus sensation     Time reflects when diagnosis was documented in both MDM as applicable and the Disposition within this note     Time User Action Codes Description Comment    6/2/2022  4:44 PM Connee Elmo Add [J39 2] Swelling of pharynx     6/2/2022  4:45 PM Connee Elmo Add [J12 9] Viral pneumonia     6/2/2022  4:45 PM Connee Elmo Add [I71 2] Ascending aortic aneurysm ([address] )     6/2/2022  4:46 PM Connee Elmo Add [R09 89] Globus sensation       ED Disposition     ED Disposition   Discharge    Condition   Stable    Date/Time   u Jun 2, 2022  4:46 PM    Comment   Kelli Red discharge to home/self care                 Follow-up Information     Follow up With Specialties Details Why Contact Info Additional Information    Yumiko Friend MD Family Medicine Schedule an appointment as soon as possible for a visit in 3 days For follow-up Lela De La Paz [address] Emergency Department Emergency Medicine Go to  As needed [address] [place] [address] Emergency Department, [address], [address] [facility] ENT [place] [facility] Schedule an appointment as soon as possible for a visit in 3 days For follow-up [address]  [address] [address]  [address] [place] ENT Celeste Couch [address], [address] [facility], [address] [facility], [address], [phone]    Desire Garcia MD Otolaryngology Schedule an appointment as soon as possible for a visit in 3 days For follow-up [address] [facility] [phone] 51 [facility] ER Department Emergency Medicine Go to  As needed [address] [address]  [address] [facility] Emergency Department, [address], [address]          Discharge Medication List as of 6/2/2022  4:48 PM      CONTINUE these medications which have NOT CHANGED    Details   acetaminophen (TYLENOL) 160 mg/5 mL suspension 30 4 mL (975 mg total) by Per G Tube route every 6 (six) hours as needed for mild pain or fever, Starting Tue 11/23/2021, No Print      albuterol (2 5 mg/3 mL) 0 083 % nebulizer solution Take 3 mL (2 5 mg total) by nebulization every 4 (four) hours as needed for wheezing or shortness of breath, Starting Thu 4/7/2022, Normal      amiodarone 100 mg tablet Take 1 tablet (100 mg total) by mouth daily with breakfast, Starting Mon 3/21/2022, Normal      apixaban (ELIQUIS) 5 mg Take 1 tablet (5 mg total) by mouth 2 (two) times a day, Starting Mon 3/21/2022, Normal      atorvastatin (LIPITOR) 40 mg tablet Take 1 tablet (40 mg total) by mouth daily, Starting Mon 3/21/2022, Normal      Benzocaine-Menthol 15-2 6 MG LOZG Apply 1 lozenge to the mouth or throat 4 (four) times a day as needed (sore throat), Starting Sat 5/14/2022, Normal      Blood Pressure Monitoring (Sphygmomanometer) MISC Use 2 (two) times a day, Starting Fri 4/30/2021, Normal      chlorhexidine (PERIDEX) 0 12 % solution Apply 15 mL to the mouth or throat every 12 (twelve) hours, Starting Tue 11/23/2021, No Print      escitalopram (LEXAPRO) 10 mg tablet TAKE 1 TABLET BY MOUTH EVERY DAY, Normal      famotidine (PEPCID) 20 mg/2 5 mL oral suspension Take 2 5 mL (20 mg total) by mouth 2 (two) times a day, Starting Tue 11/23/2021, No Print      furosemide (LASIX) 20 mg tablet Take 3 tablets (60 mg total) by mouth in the morning and 3 tablets (60 mg total) in the evening , Starting Mon 5/23/2022, Until Wed 6/22/2022, Normal      insulin aspart (NovoLOG FlexPen) 100 UNIT/ML injection pen Inject 2 Units under the skin 3 (three) times a day with meals, Starting Wed 3/23/2022, Normal      insulin glargine (Basaglar KwikPen) 100 units/mL injection pen Inject 12 Units under the skin in the morning , Starting Mon 5/23/2022, No Print      !! Insulin Pen Needle (Novofine Pen Needle) 32G X 6 MM MISC Use 3 (three) times a day with meals, Starting Wed 3/23/2022, Normal      !!  Insulin Pen Needle (UNIFINE PENTIPS PLUS) 31G X 6 MM MISC 1 Device by Does not apply route 4 (four) times a day, Starting Mon 6/5/2017, Historical Med      ipratropium (ATROVENT) 0 02 % nebulizer solution Take 2 5 mL (0 5 mg total) by nebulization 3 (three) times a day, Starting Tue 11/23/2021, No Print      Lancets MISC 1 Device by Does not apply route 4 (four) times a day, Historical Med      levalbuterol (XOPENEX) 1 25 mg/0 5 mL nebulizer solution Take 0 5 mL (1 25 mg total) by nebulization 3 (three) times a day, Starting Tue 11/23/2021, No Print      LORazepam (Ativan) 1 mg tablet Take 0 5 tablets (0 5 mg total) by mouth 2 (two) times a day as needed for anxiety, Starting Thu 4/7/2022, Normal      losartan (COZAAR) 50 mg tablet Take 1 tablet (50 mg total) by mouth daily, Starting Mon 3/21/2022, Normal      melatonin 3 mg Take 2 tablets (6 mg total) by mouth daily at bedtime, Starting Tue 3/15/2022, Normal      metoprolol tartrate (LOPRESSOR) 25 mg tablet Take 1 tablet (25 mg total) by mouth every 12 (twelve) hours, Starting Mon 3/21/2022, Normal      polyethylene glycol (MIRALAX) 17 g packet Take 17 g by mouth daily, Starting Tue 11/23/2021, No Print      potassium chloride 10% oral solution Take 15 mL (20 mEq total) by mouth 2 (two) times a day for 1 dose, Starting Mon 3/28/2022, Until Tue 3/29/2022, Normal      pregabalin (LYRICA) 75 mg capsule TAKE ONE CAPSULE EVERY DAY, Normal      senna-docusate sodium (SENOKOT S) 8 6-50 mg per tablet Take 1 tablet by mouth daily at bedtime, Starting Tue 11/23/2021, No Print      spironolactone (ALDACTONE) 100 mg tablet Take 1 tablet (100 mg total) by mouth daily, Starting Mon 3/21/2022, Normal      ticagrelor (BRILINTA) 90 MG Take 1 tablet (90 mg total) by mouth every 12 (twelve) hours, Starting Mon 3/21/2022, Normal      traZODone (DESYREL) 50 mg tablet TAKE 0 5 TABLET (25MG) BY MOUTH NIGHTLY AS NEEDED FOR SLEEP , Historical Med       !! - Potential duplicate medications found  Please discuss with provider  No discharge procedures on file      PDMP Review       Value Time User    PDMP Reviewed  Yes 4/7/2022 10:21 AM Kristin Barreto MD          ED Provider  Electronically Signed by           Sandrine Rocha MD  06/04/22 9997

## 2022-06-03 PROBLEM — I50.22 CHRONIC SYSTOLIC HEART FAILURE (HCC): Status: RESOLVED | Noted: 2022-03-21 | Resolved: 2022-06-03

## 2022-06-03 PROBLEM — I50.42 CHRONIC COMBINED SYSTOLIC (CONGESTIVE) AND DIASTOLIC (CONGESTIVE) HEART FAILURE (HCC): Status: ACTIVE | Noted: 2022-03-21

## 2022-06-03 PROBLEM — I50.43 ACUTE ON CHRONIC COMBINED SYSTOLIC AND DIASTOLIC HEART FAILURE (HCC): Status: RESOLVED | Noted: 2022-02-24 | Resolved: 2022-06-03

## 2022-06-03 NOTE — PROGRESS NOTES
Cardiology  Heart Failure   Follow Up Office Visit Note     Perla Sickle   55 y o    female   MRN: [id number]  [address]  [address] [facility] [address] [facility] [place] [address]  [phone]    PCP: Ruth Verdugo MD  Cardiologist : Dr Thierry Marcos                Summary of Recommendations  Low-sodium diet, Heart failure education as below  I reviewed the importance of medical adherence including appointments meds, diet, etc  To optimize BP: Increase losartan to  50  Mg q12h; Change metoprolol tartrate to metoprolol succinate, and increase it from 25 mg b i d  to 50 mg b i d  Periodic home blood pressure monitoring  Record on a log and bring it to the next visit  Follow-up with EP for consideration of ICD  Refer to infectious disease for clearance  nonfasting BMP, BNP, mag today  Follow up will be scheduled with Dr Thierry Marcos 4-6 weeks  Last seen in the office 2021  Follow-up with EP re:  ICD  Colon Ca screenin2019 , up to date          Impression/plan  chronic systolic and diastolic heart failure with current EF around 50% with mild MR and moderate pulmonary hypertension  She has wall motion abnormality involving inferior lateral wall consistent with her old lateral infarction  Recent adm -22  -diagnosed with COVID-19 as well as acute on chronic heart failure  Wt Readings from Last 3 Encounters:   22 84 kg (185 lb 3 2 oz)   22 82 4 kg (181 lb 10 5 oz)   22 86 2 kg (190 lb)     Wt Readings from Last 3 Encounters:   22 82 4 kg (181 lb 10 5 oz)   22 86 2 kg (190 lb)   22 86 kg (189 lb 9 5 oz)     --beta-blocker:   metoprolol tartrate 25 b i d  will change to metoprolol succinate and increase to 50 b i d  given accelerated hypertension  --Diuretic:   Lasix 60 mg b i d   --ACE/ARB/ARNI:   losartan 50 mg daily  Will increase to 50 mg q 12  --MRA: spironolactone 100  Mg/d  --SLGT2I  --ICD: TBD   Wearing a LifeVest  --2 g sodium diet, 1800 cc fluid restriction  Daily weights, call weight gain 2-3 lb in 1 day or 5 lb in 5 days  Coronary artery diseas   ·  STEMI in at October 2021 and had a stent placed in mid circumflex into OM1  OM2 was ballooned but not stented  · LHC 10/27/21  ( cardiogenic shock)  and had 90% mid circumflex stenosis, but could not be intervened on  ·  cardiac arrest 10/28/21 LHC:  found to have apical LAD complete occlusion was felt to be small to be intervened  Continue medical Rx  No symptoms of angina  ·  Has been on ticagrelor  90mg q12, plus Eliquis 5 ng BID ( AF), statin, beta-blocker  Ventricular tachycardia; Hx of multiple cardiac arrests, needing shocks  She needs to follow-up with EP  · STEMI with polymorphic VT October 20, 2021  · Likely related to lateral scar  · Currently has LifeVest in place [facility] with the Life Vest 2/10/2022 from [facility] with the plan for an elective ICD placement) and is awaiting ICD placement, she had EP follow-up @ [facility] April 2022 and then in May 2022 both canceled  This will need to be rescheduled  · Continue metoprolol tartrate 25 mg bid, amiodarone 100 mg/d  Paroxysmal atrial fibrillation , maintaining sinus rhythm with right bundle branch block  On [address] [place] [address] Road with Eliquis  antiarrhythmic therapy with amiodarone 100 mg daily   Chronic hypoxemic respiratory failure, 10 L chronically  · status post tracheostomy secondary to prolonged ventilation status post cardiac arrest   · Subglottic stenosis with history of trach  On 01/07/2022 she underwent revision of tracheostomy at [facility]  Ascending aortic aneurysm, 42 mm by CT 5/20/22  Hypertension, essential  /108  Will increase losartan, increase the beta-blocker  Periodic home blood pressure monitoring low-salt diet reinforced  Hyperlipidemia  On atorvastatin 40 mg/d   10/23/21 LDL 36 non HDL 51  T2DM, uncontrolled  5/22/22 HgA1c 12 7   On insulin  Cardiac testing  · TTE 2/21  EF 60%  · Cardiac catheterization 10/22/21    Proximal left circumflex:  100%  PCI/PATRICA  · TTE 10/23/21  EF 40-45%  Dyskinesis of the lateral wall  Moderate severe mitral regurgitation  Akinetic segment:  Basal inferolateral, basal anterolateral, mid inferolateral, mid anterolateral, apical lateral     · Cardiac catheterization 10/27/21  Widely patent previously placed 1st marginal stent  90% mid left circumflex lesion  Not amenable to PCI  · TTE 10/27/21  EF 45%  Moderate LVH  The following segments are akinetic: basal inferolateral, basal anterolateral, mid inferolateral, mid anterolateral and apical lateral   RV systolic function normal   Moderate MR with a posteriorly directed jet  Compared to study dated 10/23/2021, there are no significant changes  · Cardiac catheterization 10/28/21   Dist LAD lesion is 100% stenosed at apex from distal thrombotic embolism (too distal thrombectomy only few mm apical LAD distally beyond occlusion) ( (paradoxical, catheter related from yesterday unlikely as not there last cine, or spontaneous from low coronary flow during vfib/vt)  Widely patent circumflex stent placed 10/22/2021  · Late presentation LCX STEMI on 10/22/2021 with trop >40 and area akinetic/scarred with resultant ventricular arrhythmias post MI  · Normal LVEDP 12mmHg, PCWP mean 13mmHg  · PA sat 56% with low index and no LV volvume/pressure overload  · Normal PA systolic pressure  · Persistent inferolateral ST elevation is from scarred akinetic/dyskinetic circumflex territory  Supportive, goal directed care  Ventricular arrhythmia substrate is from organizing scar unfortunately due to completed circumflex MI with late presentation  She will likely need an ICD pre discharge if recovers  At risk for mechanical complications given completed infarct, continue monitor clinically and with any hemodynamic deterioration will need echo  · TTE 10/30/21  EF 40%  There is akinesis of the entire inferior and anterolateral portion of the LV   Wall thickness is moderately increased  Mild MR  Mild TR    · TTE 2/24/22  LVEF of 50% with wall motion noted related to prior lateral MI  There is moderate hypokinesis of the inferior and the basal anterolateral walls  Moderate LVH  Mild AIXA  Mild TR  The right ventricular systolic pressure is mildly to moderately elevated at 54 mm Hg    Changes include: Ejection fraction has improved  RV systolic pressure is elevated  HPI:   Belinda Mtz is a 54 yo female with a complex cardiac history:    In October 2021, she presented as a late presentation lateral STEMI, and had a prolonged hospital stay for about a month at [facility]  She was found to have a mid circumflex/OM stenosis which was successfully stented on 10/22/21  Her ejection fraction was 40-45%  Postprocedure, she went into cardiogenic shock and needed inotropic support with Milrinone  Her ejection fraction remained stable  She subsequently had issues with VT/VF needing shocks  She had recurrent ST elevations and was taken back to the cath lab on 10/27/21 and had 90% mid circumflex stenosis, but could not be intervened on  She had more polymorphic VT and ST elevations, and another repeat PCI attempt was made on 10/28/21, and she was found to have distal thromboembolic occlusion of distal LAD, which was not amenable to intervention  She had a prolonged hospitalization thereafter needing tracheostomy ;she was eventually discharged after a month long stay  She was discharged from the [facility] on November 23, 2021 to [facility]        Subsequently she has had issues with cardiac arrest needing AED shock, respiratory arrest and has had admissions at [facility] and multiple presentations to the ED  In February she was admitted to [facility]    An ICD was recommended however she had an infected bulla/ lung abscess  The ICD was canceled and she was discharged with a life vest      Adm 3//2022 Kyle Jones after treatment for heart failure with IV diuresis  Adm 3/25-3/28/22 [facility]  CC[de-identified] Shortness of breath  Treated for acute on chronic hypoxic respiratory failure  Followed by Cardiology  Discharge weight 189 lb  Discharge creatinine 0 85  Discharge diuretics Lasix 40 mg daily, spironolactone 100 mg daily    4/20/22  Pt canceled apt with Dr Daina Bridges EP    5/19/22  No show- EP appt, as she was adm to the hosp      Adm 5/19-5/23/22   [facility]  Chief complaint:shortness of breath   Treated for COVID 19 positive, with remdesivir and steroids given that she is high risk for complications  Also treated for acute on chronic heart failure  Discharge weight :  181 lb  Discharge creatinine:  0 81  Discharge diuretics:  Lasix 60 b i d     6/6/22  Hospital follow-up  She is accompanied by her son who adds to the history  ROS :  Periodic shortness of breath/CONNOLLY  She denies chest pain  She is wearing the life vest   She denies any discharge since wearing it  She has no lower extremity edema  She sleeps in a bed, with the head elevated chronically  She has a chronic tracheostomy  Weight   Wt Readings from Last 3 Encounters:   06/06/22 84 kg (185 lb 3 2 oz)   05/23/22 82 4 kg (181 lb 10 5 oz)   05/11/22 86 2 kg (190 lb)     EKG normal sinus rhythm 63 beats per minute  Right-sided IVCD   milliseconds  /108, confirmed by myself  Will optimize BP, changed from metoprolol tartrate to metoprolol succinate, and increase losartan  Referred to Infectious Disease and EP  She may be a candidate for a subcu ICD given that she is high risk for infection              I have spent 40 minutes with Patient and family today in which greater than 50% of this time was spent in counseling/coordination of care regarding Intructions for management, Patient and family education, Importance of tx compliance and Risk factor reductions      Assessment  Diagnoses and all orders for this visit:    Hyperlipidemia associated with type 2 diabetes mellitus ([place] [address] )    Chronic combined systolic (congestive) and diastolic (congestive) heart failure (HCC)    Chronic hypoxemic respiratory failure ([facility])    Coronary artery disease involving native coronary artery of native heart without angina pectoris    History of Ventricular tachycardia ([facility])    Cerebral aneurysm    History of Cardiac arrest ([place] [address] )    Hypertension, unspecified type    Thoracic aortic aneurysm without rupture ([facility])    CHANTALE (obstructive sleep apnea)    Paroxysmal atrial fibrillation ([place] [address] )    Tracheostomy in place [facility])        Past Medical History:   Diagnosis Date    Anxiety     Aortic aneurysm ([place] [address] )     Arthritis     Depression     Diabetes mellitus (HCC)     Fibromyalgia     GERD (gastroesophageal reflux disease)     GERD (gastroesophageal reflux disease)     H/O cardiovascular stress test 09/2018    no ischemia  EF 70%   H/O echocardiogram 01/2019    EF 60%  Mild LVH  trivial effusion   Hyperlipidemia     Hypertension     Migraines     Psychiatric disorder     anxiety    Uncontrolled hypertension 2/25/2015    Last Assessment & Plan:  BP today above goal <140/90, apparently asymptomatic  Prior BP elevations were attributed to not taking medication  Consider increased medication if persistent on f/u  Review of Systems   Constitutional: Negative for chills  Cardiovascular: Negative for chest pain, claudication, cyanosis, dyspnea on exertion, irregular heartbeat, leg swelling, near-syncope, orthopnea, palpitations, paroxysmal nocturnal dyspnea and syncope  Respiratory: Negative for cough and shortness of breath  Gastrointestinal: Negative for heartburn and nausea  Neurological: Negative for dizziness, focal weakness, headaches, light-headedness and weakness  All other systems reviewed and are negative  Allergies   Allergen Reactions    Metformin Diarrhea    Clonidine Rash     Patch            Current Outpatient Medications:     acetaminophen (TYLENOL) 160 mg/5 mL suspension, 30 4 mL (975 mg total) by Per G Tube route every 6 (six) hours as needed for mild pain or fever, Disp: , Rfl: 0    albuterol (2 5 mg/3 mL) 0 083 % nebulizer solution, Take 3 mL (2 5 mg total) by nebulization every 4 (four) hours as needed for wheezing or shortness of breath, Disp: 180 mL, Rfl: 5    amiodarone 100 mg tablet, Take 1 tablet (100 mg total) by mouth daily with breakfast, Disp: 90 tablet, Rfl: 0    apixaban (ELIQUIS) 5 mg, Take 1 tablet (5 mg total) by mouth 2 (two) times a day, Disp: 180 tablet, Rfl: 0    atorvastatin (LIPITOR) 40 mg tablet, Take 1 tablet (40 mg total) by mouth daily, Disp: 90 tablet, Rfl: 0    Benzocaine-Menthol 15-2 6 MG LOZG, Apply 1 lozenge to the mouth or throat 4 (four) times a day as needed (sore throat), Disp: 15 lozenge, Rfl: 0    Blood Pressure Monitoring (Sphygmomanometer) MISC, Use 2 (two) times a day (Patient not taking: Reported on 7/13/2021), Disp: 1 each, Rfl: 0    chlorhexidine (PERIDEX) 0 12 % solution, Apply 15 mL to the mouth or throat every 12 (twelve) hours, Disp: 120 mL, Rfl: 0    escitalopram (LEXAPRO) 10 mg tablet, TAKE 1 TABLET BY MOUTH EVERY DAY, Disp: 60 tablet, Rfl: 0    famotidine (PEPCID) 20 mg/2 5 mL oral suspension, Take 2 5 mL (20 mg total) by mouth 2 (two) times a day, Disp: , Rfl: 0    furosemide (LASIX) 20 mg tablet, Take 3 tablets (60 mg total) by mouth in the morning and 3 tablets (60 mg total) in the evening , Disp: 180 tablet, Rfl: 0    insulin aspart (NovoLOG FlexPen) 100 UNIT/ML injection pen, Inject 2 Units under the skin 3 (three) times a day with meals, Disp: 15 mL, Rfl: 1    insulin glargine (Basaglar KwikPen) 100 units/mL injection pen, Inject 12 Units under the skin in the morning , Disp: 15 mL, Rfl: 0    Insulin Pen Needle (Novofine Pen Needle) 32G X 6 MM MISC, Use 3 (three) times a day with meals, Disp: 100 each, Rfl: 2    Insulin Pen Needle (UNIFINE PENTIPS PLUS) 31G X 6 MM MISC, 1 Device by Does not apply route 4 (four) times a day, Disp: , Rfl:     ipratropium (ATROVENT) 0 02 % nebulizer solution, Take 2 5 mL (0 5 mg total) by nebulization 3 (three) times a day, Disp: , Rfl: 0    Lancets MISC, 1 Device by Does not apply route 4 (four) times a day (Patient not taking: Reported on 7/13/2021), Disp: , Rfl:     levalbuterol (XOPENEX) 1 25 mg/0 5 mL nebulizer solution, Take 0 5 mL (1 25 mg total) by nebulization 3 (three) times a day, Disp: , Rfl: 0    LORazepam (Ativan) 1 mg tablet, Take 0 5 tablets (0 5 mg total) by mouth 2 (two) times a day as needed for anxiety, Disp: 14 tablet, Rfl: 0    losartan (COZAAR) 50 mg tablet, Take 1 tablet (50 mg total) by mouth daily, Disp: 90 tablet, Rfl: 0    melatonin 3 mg, Take 2 tablets (6 mg total) by mouth daily at bedtime, Disp: 180 tablet, Rfl: 0    metoprolol tartrate (LOPRESSOR) 25 mg tablet, Take 1 tablet (25 mg total) by mouth every 12 (twelve) hours, Disp: 180 tablet, Rfl: 0    polyethylene glycol (MIRALAX) 17 g packet, Take 17 g by mouth daily, Disp: , Rfl: 0    potassium chloride 10% oral solution, Take 15 mL (20 mEq total) by mouth 2 (two) times a day for 1 dose, Disp: 15 mL, Rfl: 0    pregabalin (LYRICA) 75 mg capsule, TAKE ONE CAPSULE EVERY DAY, Disp: 90 capsule, Rfl: 1    senna-docusate sodium (SENOKOT S) 8 6-50 mg per tablet, Take 1 tablet by mouth daily at bedtime, Disp: , Rfl: 0    spironolactone (ALDACTONE) 100 mg tablet, Take 1 tablet (100 mg total) by mouth daily, Disp: 90 tablet, Rfl: 0    ticagrelor (BRILINTA) 90 MG, Take 1 tablet (90 mg total) by mouth every 12 (twelve) hours, Disp: 180 tablet, Rfl: 0    traZODone (DESYREL) 50 mg tablet, TAKE 0 5 TABLET (25MG) BY MOUTH NIGHTLY AS NEEDED FOR SLEEP , Disp: , Rfl:     Social History     Socioeconomic History    Marital status: Legally      Spouse name: Not on file    Number of children: Not on file    Years of education: Not on file    Highest education level: Not on file   Occupational History    Not on file   Tobacco Use    Smoking status: Former Smoker     Packs/day: 1 00     Years: 30 00     Pack years: 30 00     Types: Cigarettes    Smokeless tobacco: Never Used   Vaping Use    Vaping Use: Never used   Substance and Sexual Activity    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   Drug use: Yes     Types: Marijuana     Comment: medical; seldom use    Sexual activity: Yes     Birth control/protection: Female Sterilization     Comment: Monogamous relationship with monogamous partner   Other Topics Concern    Not on file   Social History Narrative    Most recent tobacco use screenin2019    Do you currently or have you served in [employer]: No    Were you activated, into active duty, as a member of the [employer] or as a Reservist: No    Advance directive: No    Chewing tobacco: none    Alcohol intake: None    Marital status: Single    Live alone or with others: with others    Family history of heart disease:    aortic aneurysm    High cholesterol: Yes    High blood pressure: Yes    Exercise level: Heavy    Overweight: Yes    Obese: Yes    Diabetes: Yes    General stress level: High    Diet: Regular    Caffeine intake: Occasional    Guns present in home: No    Seat belts used routinely: Yes    Sexual orientation: Heterosexual    Sunscreen used routinely: No    Seat belt/car seat used routinely: Yes    Exercise-How often do you exercise: as tolerated    Do you have regular medical check ups: Yes    Do you have regular dental check ups: Yes    Illicit drugs-years of use:    recovery 23 years - HX of     Social Determinants of Health     Financial Resource Strain: Not on file   Food Insecurity: Food Insecurity Present    Worried About Running Out of Food in the Last Year: Sometimes true    Lela of Food in the Last Year: Sometimes true   Transportation Needs: No Transportation Needs    Lack of Transportation (Medical):  No    Lack of Transportation (Non-Medical): No   Physical Activity: Not on file   Stress: Not on file   Social Connections: Not on file   Intimate Partner Violence: Not on file   Housing Stability: Low Risk     Unable to Pay for Housing in the Last Year: No    Number of Places Lived in the Last Year: 1    Unstable Housing in the Last Year: No       Family History   Problem Relation Age of Onset    Hypertension Mother    [place] Arthritis Mother     Diabetes Father     Other Sister         renal cell carcinoma    Diabetes Other     Factor V Leiden deficiency Other        Physical Exam  Vitals and nursing note reviewed  Constitutional:       General: She is not in acute distress  Appearance: She is not diaphoretic  HENT:      Head: Normocephalic and atraumatic  Eyes:      Conjunctiva/sclera: Conjunctivae normal    Cardiovascular:      Rate and Rhythm: Normal rate and regular rhythm  Pulses: Intact distal pulses  Heart sounds: Normal heart sounds  Pulmonary:      Effort: Pulmonary effort is normal       Breath sounds: Normal breath sounds  Abdominal:      General: Bowel sounds are normal       Palpations: Abdomen is soft  Musculoskeletal:         General: Normal range of motion  Cervical back: Normal range of motion and neck supple  Skin:     General: Skin is warm and dry  Neurological:      Mental Status: She is alert and oriented to person, place, and time  Vitals: There were no vitals taken for this visit     Wt Readings from Last 3 Encounters:   05/23/22 82 4 kg (181 lb 10 5 oz)   05/11/22 86 2 kg (190 lb)   03/28/22 86 kg (189 lb 9 5 oz)         Labs & Results:  Lab Results   Component Value Date    WBC 10 78 (H) 05/23/2022    HGB 10 1 (L) 05/23/2022    HCT 32 7 (L) 05/23/2022    MCV 80 (L) 05/23/2022     (H) 05/23/2022     BNP   Date Value Ref Range Status   05/19/2022 454 (H) 0 - 100 pg/mL Final   05/11/2022 342 (H) 0 - 100 pg/mL Final   03/27/2022 280 (H) 1 - 100 pg/mL Final No components found for: CHEM    No results found for this or any previous visit  No results found for this or any previous visit  This note was completed in part utilizing m-modal fluency direct voice recognition software  Grammatical errors, random word insertion, spelling mistakes, and incomplete sentences may be an occasional consequence of the system secondary to software limitations, ambient noise and hardware issues  At the time of dictation, efforts were made to edit, clarify and /or correct errors  Please read the chart carefully and recognize, using context, where substitutions have occurred    If you have any questions or concerns about the context, text or information contained within the body of this dictation, please contact myself, the provider, for further clarification

## 2022-06-06 ENCOUNTER — LAB (OUTPATIENT)
Dept: LAB | Facility: CLINIC | Age: 57
End: 2022-06-06
Payer: COMMERCIAL

## 2022-06-06 ENCOUNTER — TELEPHONE (OUTPATIENT)
Dept: INFECTIOUS DISEASES | Facility: CLINIC | Age: 57
End: 2022-06-06

## 2022-06-06 ENCOUNTER — OFFICE VISIT (OUTPATIENT)
Dept: CARDIOLOGY CLINIC | Facility: CLINIC | Age: 57
End: 2022-06-06
Payer: COMMERCIAL

## 2022-06-06 VITALS
OXYGEN SATURATION: 100 % | DIASTOLIC BLOOD PRESSURE: 108 MMHG | WEIGHT: 185.2 LBS | HEIGHT: 69 IN | BODY MASS INDEX: 27.43 KG/M2 | SYSTOLIC BLOOD PRESSURE: 182 MMHG | HEART RATE: 68 BPM

## 2022-06-06 DIAGNOSIS — I10 HYPERTENSION, UNSPECIFIED TYPE: Chronic | ICD-10-CM

## 2022-06-06 DIAGNOSIS — E78.5 HYPERLIPIDEMIA ASSOCIATED WITH TYPE 2 DIABETES MELLITUS (HCC): Primary | Chronic | ICD-10-CM

## 2022-06-06 DIAGNOSIS — I71.2 THORACIC AORTIC ANEURYSM WITHOUT RUPTURE (HCC): ICD-10-CM

## 2022-06-06 DIAGNOSIS — I46.9 CARDIAC ARREST (HCC): ICD-10-CM

## 2022-06-06 DIAGNOSIS — I50.42 CHRONIC COMBINED SYSTOLIC (CONGESTIVE) AND DIASTOLIC (CONGESTIVE) HEART FAILURE (HCC): ICD-10-CM

## 2022-06-06 DIAGNOSIS — I50.22 CHRONIC SYSTOLIC HEART FAILURE (HCC): ICD-10-CM

## 2022-06-06 DIAGNOSIS — E11.69 HYPERLIPIDEMIA ASSOCIATED WITH TYPE 2 DIABETES MELLITUS (HCC): Primary | Chronic | ICD-10-CM

## 2022-06-06 DIAGNOSIS — I67.1 CEREBRAL ANEURYSM: ICD-10-CM

## 2022-06-06 DIAGNOSIS — Z79.899 ON AMIODARONE THERAPY: ICD-10-CM

## 2022-06-06 DIAGNOSIS — R06.00 DOE (DYSPNEA ON EXERTION): ICD-10-CM

## 2022-06-06 DIAGNOSIS — I48.0 PAROXYSMAL ATRIAL FIBRILLATION (HCC): ICD-10-CM

## 2022-06-06 DIAGNOSIS — J96.11 CHRONIC HYPOXEMIC RESPIRATORY FAILURE (HCC): ICD-10-CM

## 2022-06-06 DIAGNOSIS — Z93.0 TRACHEOSTOMY IN PLACE (HCC): ICD-10-CM

## 2022-06-06 DIAGNOSIS — I71.1 THORACIC AORTIC ANEURYSM, RUPTURED (HCC): ICD-10-CM

## 2022-06-06 DIAGNOSIS — G47.33 OSA (OBSTRUCTIVE SLEEP APNEA): ICD-10-CM

## 2022-06-06 DIAGNOSIS — Z87.09: ICD-10-CM

## 2022-06-06 DIAGNOSIS — I25.10 CORONARY ARTERY DISEASE INVOLVING NATIVE CORONARY ARTERY OF NATIVE HEART WITHOUT ANGINA PECTORIS: ICD-10-CM

## 2022-06-06 DIAGNOSIS — I47.2 VENTRICULAR TACHYCARDIA (HCC): ICD-10-CM

## 2022-06-06 DIAGNOSIS — I48.91 ATRIAL FIBRILLATION, UNSPECIFIED TYPE (HCC): ICD-10-CM

## 2022-06-06 PROBLEM — I71.10: Status: ACTIVE | Noted: 2022-06-06

## 2022-06-06 LAB
ANION GAP SERPL CALCULATED.3IONS-SCNC: 9 MMOL/L (ref 4–13)
BUN SERPL-MCNC: 10 MG/DL (ref 5–25)
CALCIUM SERPL-MCNC: 8.4 MG/DL (ref 8.4–10.2)
CHLORIDE SERPL-SCNC: 97 MMOL/L (ref 96–108)
CO2 SERPL-SCNC: 29 MMOL/L (ref 21–32)
CREAT SERPL-MCNC: 0.77 MG/DL (ref 0.6–1.3)
ERYTHROCYTE [DISTWIDTH] IN BLOOD BY AUTOMATED COUNT: 16.7 % (ref 11.6–15.1)
GFR SERPL CREATININE-BSD FRML MDRD: 87 ML/MIN/1.73SQ M
GLUCOSE SERPL-MCNC: 560 MG/DL (ref 65–140)
HCT VFR BLD AUTO: 32.2 % (ref 34.8–46.1)
HGB BLD-MCNC: 9.8 G/DL (ref 11.5–15.4)
MAGNESIUM SERPL-MCNC: 1.6 MG/DL (ref 1.9–2.7)
MCH RBC QN AUTO: 24.3 PG (ref 26.8–34.3)
MCHC RBC AUTO-ENTMCNC: 30.4 G/DL (ref 31.4–37.4)
MCV RBC AUTO: 80 FL (ref 82–98)
NT-PROBNP SERPL-MCNC: 4406 PG/ML
PLATELET # BLD AUTO: 357 THOUSANDS/UL (ref 149–390)
PMV BLD AUTO: 11.3 FL (ref 8.9–12.7)
POTASSIUM SERPL-SCNC: 3.4 MMOL/L (ref 3.5–5.3)
RBC # BLD AUTO: 4.03 MILLION/UL (ref 3.81–5.12)
SODIUM SERPL-SCNC: 135 MMOL/L (ref 135–147)
TSH SERPL DL<=0.05 MIU/L-ACNC: 1.65 UIU/ML (ref 0.45–4.5)
WBC # BLD AUTO: 7.64 THOUSAND/UL (ref 4.31–10.16)

## 2022-06-06 PROCEDURE — 3080F DIAST BP >= 90 MM HG: CPT | Performed by: NURSE PRACTITIONER

## 2022-06-06 PROCEDURE — 83880 ASSAY OF NATRIURETIC PEPTIDE: CPT

## 2022-06-06 PROCEDURE — 3077F SYST BP >= 140 MM HG: CPT | Performed by: NURSE PRACTITIONER

## 2022-06-06 PROCEDURE — 80048 BASIC METABOLIC PNL TOTAL CA: CPT

## 2022-06-06 PROCEDURE — 84443 ASSAY THYROID STIM HORMONE: CPT

## 2022-06-06 PROCEDURE — 3008F BODY MASS INDEX DOCD: CPT | Performed by: NURSE PRACTITIONER

## 2022-06-06 PROCEDURE — 83735 ASSAY OF MAGNESIUM: CPT

## 2022-06-06 PROCEDURE — 93000 ELECTROCARDIOGRAM COMPLETE: CPT | Performed by: NURSE PRACTITIONER

## 2022-06-06 PROCEDURE — 36415 COLL VENOUS BLD VENIPUNCTURE: CPT

## 2022-06-06 PROCEDURE — 1036F TOBACCO NON-USER: CPT | Performed by: NURSE PRACTITIONER

## 2022-06-06 PROCEDURE — 85027 COMPLETE CBC AUTOMATED: CPT

## 2022-06-06 PROCEDURE — 1111F DSCHRG MED/CURRENT MED MERGE: CPT | Performed by: NURSE PRACTITIONER

## 2022-06-06 PROCEDURE — 4010F ACE/ARB THERAPY RXD/TAKEN: CPT | Performed by: NURSE PRACTITIONER

## 2022-06-06 PROCEDURE — 99215 OFFICE O/P EST HI 40 MIN: CPT | Performed by: NURSE PRACTITIONER

## 2022-06-06 RX ORDER — LOSARTAN POTASSIUM 50 MG/1
50 TABLET ORAL EVERY 12 HOURS
Qty: 90 TABLET | Refills: 0 | Status: ON HOLD
Start: 2022-06-06

## 2022-06-06 RX ORDER — METOPROLOL SUCCINATE 50 MG/1
50 TABLET, EXTENDED RELEASE ORAL EVERY 12 HOURS
Qty: 180 TABLET | Refills: 3 | Status: ON HOLD | OUTPATIENT
Start: 2022-06-06

## 2022-06-06 NOTE — TELEPHONE ENCOUNTER
Cardio called over to get this pt scheduled in for hx of lung abscess  Per note looks like pt is needing clearance for SubQ ICD  Did notify cardio office at this time booking into July and will have provider review to see if we can get scheduled in sooner  They ask that we contact the son to get scheduled in   Also informed them would get office provider to review chart to see if can get scheduled in sooner

## 2022-06-06 NOTE — PATIENT INSTRUCTIONS

## 2022-06-06 NOTE — LETTER
2022     Vickie Knott, [address] [facility] [address]    Patient: Ezequiel Peterson   YOB: 1965   Date of Visit: 2022       Dear Dr [facility]:    Thank you for referring Malcolm Tripathi to me for evaluation  Below are my notes for this consultation  If you have questions, please do not hesitate to call me  I look forward to following your patient along with you  Sincerely,        NEELAM Crockett        CC: [facility] Indira Lucio, [facility]  2022 12:40 PM  Sign when Signing Visit  Cardiology  Heart Failure   Follow Up Office Visit Note     Ezequiel Peterson   55 y o    female   MRN: [id number]  [address]  [address] [place] [id number] [facility] [place] [address] [phone]    PCP: Mary Mcmahan MD  Cardiologist : Dr Modesto Bro                Summary of Recommendations  Low-sodium diet, Heart failure education as below  I reviewed the importance of medical adherence including appointments meds, diet, etc  To optimize BP: Increase losartan to  50  Mg q12h; Change metoprolol tartrate to metoprolol succinate, and increase it from 25 mg b i d  to 50 mg b i d  Periodic home blood pressure monitoring  Record on a log and bring it to the next visit  Follow-up with EP for consideration of ICD  Refer to infectious disease for clearance  nonfasting BMP, BNP, mag today  Follow up will be scheduled with Dr Modesto Bro 4-6 weeks  Last seen in the office 2021  Follow-up with EP re:  ICD  Colon Ca screenin2019 , up to date          Impression/plan  chronic systolic and diastolic heart failure with current EF around 50% with mild MR and moderate pulmonary hypertension  She has wall motion abnormality involving inferior lateral wall consistent with her old lateral infarction  Recent adm -22  -diagnosed with COVID-19 as well as acute on chronic heart failure  Wt Readings from Last 3 Encounters:   22 84 kg (185 lb 3 2 oz)   05/23/22 82 4 kg (181 lb 10 5 oz)   05/11/22 86 2 kg (190 lb)     Wt Readings from Last 3 Encounters:   05/23/22 82 4 kg (181 lb 10 5 oz)   05/11/22 86 2 kg (190 lb)   03/28/22 86 kg (189 lb 9 5 oz)     --beta-blocker:   metoprolol tartrate 25 b i d  will change to metoprolol succinate and increase to 50 b i d  given accelerated hypertension  --Diuretic:   Lasix 60 mg b i d   --ACE/ARB/ARNI:   losartan 50 mg daily  Will increase to 50 mg q 12  --MRA: spironolactone 100  Mg/d  --SLGT2I  --ICD: TBD  Wearing a LifeVest  --2 g sodium diet, 1800 cc fluid restriction  Daily weights, call weight gain 2-3 lb in 1 day or 5 lb in 5 days  Coronary artery diseas   ·  STEMI in at October 2021 and had a stent placed in mid circumflex into OM1  OM2 was ballooned but not stented  · LHC 10/27/21  ( cardiogenic shock)  and had 90% mid circumflex stenosis, but could not be intervened on  ·  cardiac arrest 10/28/21 LHC:  found to have apical LAD complete occlusion was felt to be small to be intervened  Continue medical Rx  No symptoms of angina  ·  Has been on ticagrelor  90mg q12, plus Eliquis 5 ng BID ( AF), statin, beta-blocker  Ventricular tachycardia; Hx of multiple cardiac arrests, needing shocks  She needs to follow-up with EP  · STEMI with polymorphic VT October 20, 2021  · Likely related to lateral scar  · Currently has LifeVest in place [facility] with the Life Vest 2/10/2022 from [facility] with the plan for an elective ICD placement) and is awaiting ICD placement, [facility] and then in May 2022 both canceled  This will need to be rescheduled  · Continue metoprolol tartrate 25 mg bid, amiodarone 100 mg/d  Paroxysmal atrial fibrillation , maintaining sinus rhythm with right bundle branch block  On [address] with Eliquis  antiarrhythmic therapy with amiodarone 100 mg daily      Chronic hypoxemic respiratory failure, 10 L chronically  · status post tracheostomy secondary to prolonged ventilation status post cardiac arrest   · Subglottic stenosis with history of trach  On 01/07/2022 she underwent revision of tracheostomy at [facility]  Ascending aortic aneurysm, 42 mm by CT 5/20/22  Hypertension, essential  /108  Will increase losartan, increase the beta-blocker  Periodic home blood pressure monitoring low-salt diet reinforced  Hyperlipidemia  On atorvastatin 40 mg/d   10/23/21 LDL 36 non HDL 51  T2DM, uncontrolled  5/22/22 HgA1c 12 7   On insulin  Cardiac testing  · TTE 2/21  EF 60%  · Cardiac catheterization 10/22/21  Proximal left circumflex:  100%  PCI/PATRICA  · TTE 10/23/21  EF 40-45%  Dyskinesis of the lateral wall  Moderate severe mitral regurgitation  Akinetic segment:  Basal inferolateral, basal anterolateral, mid inferolateral, mid anterolateral, apical lateral     · Cardiac catheterization 10/27/21  Widely patent previously placed 1st marginal stent  90% mid left circumflex lesion  Not amenable to PCI  · TTE 10/27/21  EF 45%  Moderate LVH  The following segments are akinetic: basal inferolateral, basal anterolateral, mid inferolateral, mid anterolateral and apical lateral   RV systolic function normal   Moderate MR with a posteriorly directed jet  Compared to study dated 10/23/2021, there are no significant changes  · Cardiac catheterization 10/28/21   Dist LAD lesion is 100% stenosed at apex from distal thrombotic embolism (too distal thrombectomy only few mm apical LAD distally beyond occlusion) ( (paradoxical, catheter related from yesterday unlikely as not there last cine, or spontaneous from low coronary flow during vfib/vt)  Widely patent circumflex stent placed 10/22/2021  · Late presentation LCX STEMI on 10/22/2021 with trop >40 and area akinetic/scarred with resultant ventricular arrhythmias post MI  · Normal LVEDP 12mmHg, PCWP mean 13mmHg    · PA sat 56% with low index and no LV volvume/pressure overload  · Normal PA systolic pressure  · Persistent inferolateral ST elevation is from scarred akinetic/dyskinetic circumflex territory  Supportive, goal directed care  Ventricular arrhythmia substrate is from organizing scar unfortunately due to completed circumflex MI with late presentation  She will likely need an ICD pre discharge if recovers  At risk for mechanical complications given completed infarct, continue monitor clinically and with any hemodynamic deterioration will need echo  · TTE 10/30/21  EF 40%  There is akinesis of the entire inferior and anterolateral portion of the LV  Wall thickness is moderately increased  Mild MR  Mild TR    · TTE 2/24/22  LVEF of 50% with wall motion noted related to prior lateral MI  There is moderate hypokinesis of the inferior and the basal anterolateral walls  Moderate LVH  Mild AIXA  Mild TR  The right ventricular systolic pressure is mildly to moderately elevated at 54 mm Hg    Changes include: Ejection fraction has improved  RV systolic pressure is elevated  HPI:   Jaida Romano is a 54 yo female with a complex cardiac history:    In October 2021, she presented as a late presentation lateral STEMI, and had a prolonged hospital stay for about a month at [facility]  She was found to have a mid circumflex/OM stenosis which was successfully stented on 10/22/21  Her ejection fraction was 40-45%  Postprocedure, she went into cardiogenic shock and needed inotropic support with Milrinone  Her ejection fraction remained stable  She subsequently had issues with VT/VF needing shocks  She had recurrent ST elevations and was taken back to the cath lab on 10/27/21 and had 90% mid circumflex stenosis, but could not be intervened on  She had more polymorphic VT and ST elevations, and another repeat PCI attempt was made on 10/28/21, and she was found to have distal thromboembolic occlusion of distal LAD, which was not amenable to intervention      She had a prolonged hospitalization thereafter needing tracheostomy ;she was eventually discharged after a month long stay  [facility]        Subsequently she has had issues with cardiac arrest needing AED shock, respiratory arrest and has had admissions at [facility] and multiple presentations to the ED  In February she was admitted to [facility]  An ICD was recommended however she had an infected bulla/ lung abscess  The ICD was canceled and she was discharged with a life vest      Adm 3//2022 Kyle 40 after treatment for heart failure with IV diuresis  Adm 3/25-3/28/22 [facility]  CC[de-identified] Shortness of breath  Treated for acute on chronic hypoxic respiratory failure  Followed by Cardiology  Discharge weight 189 lb  Discharge creatinine 0 85  Discharge diuretics Lasix 40 mg daily, spironolactone 100 mg daily    4/20/22  Pt canceled apt with Dr Zaid Rangel EP    5/19/22  No show- EP appt, as she was adm to the hosp      Adm 5/19-5/23/22  [facility]  Chief complaint:shortness of breath   Treated for COVID 19 positive, with remdesivir and steroids given that she is high risk for complications  Also treated for acute on chronic heart failure  Discharge weight :  181 lb  Discharge creatinine:  0 81  Discharge diuretics:  Lasix 60 b i d     6/6/22  Hospital follow-up  She is accompanied by her son who adds to the history  ROS :  Periodic shortness of breath/CONNOLLY  She denies chest pain  She is wearing the life vest   She denies any discharge since wearing it  She has no lower extremity edema  She sleeps in a bed, with the head elevated chronically  She has a chronic tracheostomy  Weight   Wt Readings from Last 3 Encounters:   06/06/22 84 kg (185 lb 3 2 oz)   05/23/22 82 4 kg (181 lb 10 5 oz)   05/11/22 86 2 kg (190 lb)     EKG normal sinus rhythm 63 beats per minute  Right-sided IVCD     milliseconds  /108, confirmed by myself  Will optimize BP, changed from metoprolol tartrate to metoprolol succinate, and increase losartan  Referred to Infectious Disease and EP  She may be a candidate for a subcu ICD given that she is high risk for infection              I have spent 40 minutes with Patient and family today in which greater than 50% of this time was spent in counseling/coordination of care regarding Intructions for management, Patient and family education, Importance of tx compliance and Risk factor reductions  Assessment  Diagnoses and all orders for this visit:    Hyperlipidemia associated with type 2 diabetes mellitus ([facility] )    Chronic combined systolic (congestive) and diastolic (congestive) heart failure (HCC)    Chronic hypoxemic respiratory failure (HCC)    Coronary artery disease involving native coronary artery of native heart without angina pectoris    History of Ventricular tachycardia ([facility])    Cerebral aneurysm    History of Cardiac arrest ([place] [address] )    Hypertension, unspecified type    Thoracic aortic aneurysm without rupture ([facility])    CHANTALE (obstructive sleep apnea)    Paroxysmal atrial fibrillation ([place] [address] )    Tracheostomy in place [facility])        Past Medical History:   Diagnosis Date    Anxiety     Aortic aneurysm (HCC)     Arthritis     Depression     Diabetes mellitus (HCC)     Fibromyalgia     GERD (gastroesophageal reflux disease)     GERD (gastroesophageal reflux disease)     H/O cardiovascular stress test 09/2018    no ischemia  EF 70%   H/O echocardiogram 01/2019    EF 60%  Mild LVH  trivial effusion   Hyperlipidemia     Hypertension     Migraines     Psychiatric disorder     anxiety    Uncontrolled hypertension 2/25/2015    Last Assessment & Plan:  BP today above goal <140/90, apparently asymptomatic  Prior BP elevations were attributed to not taking medication  Consider increased medication if persistent on f/u  Review of Systems   Constitutional: Negative for chills     Cardiovascular: Negative for chest pain, claudication, cyanosis, dyspnea on exertion, irregular heartbeat, leg swelling, near-syncope, orthopnea, palpitations, paroxysmal nocturnal dyspnea and syncope  Respiratory: Negative for cough and shortness of breath  Gastrointestinal: Negative for heartburn and nausea  Neurological: Negative for dizziness, focal weakness, headaches, light-headedness and weakness  All other systems reviewed and are negative  Allergies   Allergen Reactions    Metformin Diarrhea    Clonidine Rash     Patch            Current Outpatient Medications:     acetaminophen (TYLENOL) 160 mg/5 mL suspension, 30 4 mL (975 mg total) by Per G Tube route every 6 (six) hours as needed for mild pain or fever, Disp: , Rfl: 0    albuterol (2 5 mg/3 mL) 0 083 % nebulizer solution, Take 3 mL (2 5 mg total) by nebulization every 4 (four) hours as needed for wheezing or shortness of breath, Disp: 180 mL, Rfl: 5    amiodarone 100 mg tablet, Take 1 tablet (100 mg total) by mouth daily with breakfast, Disp: 90 tablet, Rfl: 0    apixaban (ELIQUIS) 5 mg, Take 1 tablet (5 mg total) by mouth 2 (two) times a day, Disp: 180 tablet, Rfl: 0    atorvastatin (LIPITOR) 40 mg tablet, Take 1 tablet (40 mg total) by mouth daily, Disp: 90 tablet, Rfl: 0    Benzocaine-Menthol 15-2 6 MG LOZG, Apply 1 lozenge to the mouth or throat 4 (four) times a day as needed (sore throat), Disp: 15 lozenge, Rfl: 0    Blood Pressure Monitoring (Sphygmomanometer) MISC, Use 2 (two) times a day (Patient not taking: Reported on 7/13/2021), Disp: 1 each, Rfl: 0    chlorhexidine (PERIDEX) 0 12 % solution, Apply 15 mL to the mouth or throat every 12 (twelve) hours, Disp: 120 mL, Rfl: 0    escitalopram (LEXAPRO) 10 mg tablet, TAKE 1 TABLET BY MOUTH EVERY DAY, Disp: 60 tablet, Rfl: 0    famotidine (PEPCID) 20 mg/2 5 mL oral suspension, Take 2 5 mL (20 mg total) by mouth 2 (two) times a day, Disp: , Rfl: 0    furosemide (LASIX) 20 mg tablet, Take 3 tablets (60 mg total) by mouth in the morning and 3 tablets (60 mg total) in the evening , Disp: 180 tablet, Rfl: 0    insulin aspart (NovoLOG FlexPen) 100 UNIT/ML injection pen, Inject 2 Units under the skin 3 (three) times a day with meals, Disp: 15 mL, Rfl: 1    insulin glargine (Basaglar KwikPen) 100 units/mL injection pen, Inject 12 Units under the skin in the morning , Disp: 15 mL, Rfl: 0    Insulin Pen Needle (Novofine Pen Needle) 32G X 6 MM MISC, Use 3 (three) times a day with meals, Disp: 100 each, Rfl: 2    Insulin Pen Needle (UNIFINE PENTIPS PLUS) 31G X 6 MM MISC, 1 Device by Does not apply route 4 (four) times a day, Disp: , Rfl:     ipratropium (ATROVENT) 0 02 % nebulizer solution, Take 2 5 mL (0 5 mg total) by nebulization 3 (three) times a day, Disp: , Rfl: 0    Lancets MISC, 1 Device by Does not apply route 4 (four) times a day (Patient not taking: Reported on 7/13/2021), Disp: , Rfl:     levalbuterol (XOPENEX) 1 25 mg/0 5 mL nebulizer solution, Take 0 5 mL (1 25 mg total) by nebulization 3 (three) times a day, Disp: , Rfl: 0    LORazepam (Ativan) 1 mg tablet, Take 0 5 tablets (0 5 mg total) by mouth 2 (two) times a day as needed for anxiety, Disp: 14 tablet, Rfl: 0    losartan (COZAAR) 50 mg tablet, Take 1 tablet (50 mg total) by mouth daily, Disp: 90 tablet, Rfl: 0    melatonin 3 mg, Take 2 tablets (6 mg total) by mouth daily at bedtime, Disp: 180 tablet, Rfl: 0    metoprolol tartrate (LOPRESSOR) 25 mg tablet, Take 1 tablet (25 mg total) by mouth every 12 (twelve) hours, Disp: 180 tablet, Rfl: 0    polyethylene glycol (MIRALAX) 17 g packet, Take 17 g by mouth daily, Disp: , Rfl: 0    potassium chloride 10% oral solution, Take 15 mL (20 mEq total) by mouth 2 (two) times a day for 1 dose, Disp: 15 mL, Rfl: 0    pregabalin (LYRICA) 75 mg capsule, TAKE ONE CAPSULE EVERY DAY, Disp: 90 capsule, Rfl: 1    senna-docusate sodium (SENOKOT S) 8 6-50 mg per tablet, Take 1 tablet by mouth daily at bedtime, Disp: , Rfl: 0    spironolactone (ALDACTONE) 100 mg tablet, Take 1 tablet (100 mg total) by mouth daily, Disp: 90 tablet, Rfl: 0    ticagrelor (BRILINTA) 90 MG, Take 1 tablet (90 mg total) by mouth every 12 (twelve) hours, Disp: 180 tablet, Rfl: 0    traZODone (DESYREL) 50 mg tablet, TAKE 0 5 TABLET (25MG) BY MOUTH NIGHTLY AS NEEDED FOR SLEEP , Disp: , Rfl:     Social History     Socioeconomic History    Marital status: Legally      Spouse name: Not on file    Number of children: Not on file    Years of education: Not on file    Highest education level: Not on file   Occupational History    Not on file   Tobacco Use    Smoking status: Former Smoker     Packs/day:  00     Years: 30 00     Pack years: 30 00     Types: Cigarettes    Smokeless tobacco: Never Used   Vaping Use    Vaping Use: Never used   Substance and Sexual Activity    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       Drug use: Yes     Types: Marijuana     Comment: medical; seldom use    Sexual activity: Yes     Birth control/protection: Female Sterilization     Comment: Monogamous relationship with monogamous partner   Other Topics Concern    Not on file   Social History Narrative    Most recent tobacco use screenin2019    Do you currently or have you served in the [employer]: No    Were you activated, into active duty, as a member of the [employer] or as a Reservist: No    Advance directive: No    Chewing tobacco: none    Alcohol intake: None    Marital status: Single    Live alone or with others: with others    Family history of heart disease:    aortic aneurysm    High cholesterol: Yes    High blood pressure: Yes    Exercise level: Heavy    Overweight: Yes    Obese: Yes    Diabetes: Yes    General stress level: High    Diet: Regular    Caffeine intake: Occasional    Guns present in home: No    Seat belts used routinely: Yes    Sexual orientation: Heterosexual    Sunscreen used routinely: No    Seat belt/car seat used routinely: Yes Exercise-How often do you exercise: as tolerated    Do you have regular medical check ups: Yes    Do you have regular dental check ups: Yes    Illicit drugs-years of use:    recovery 23 years - HX of     Social Determinants of Health     Financial Resource Strain: Not on file   Food Insecurity: Food Insecurity Present    Worried About Running Out of Food in the Last Year: Sometimes true    Lela of Food in the Last Year: Sometimes true   Transportation Needs: No Transportation Needs    Lack of Transportation (Medical): No    Lack of Transportation (Non-Medical): No   Physical Activity: Not on file   Stress: Not on file   Social Connections: Not on file   Intimate Partner Violence: Not on file   Housing Stability: Low Risk     Unable to Pay for Housing in the Last Year: No    Number of Places Lived in the Last Year: 1    Unstable Housing in the Last Year: No       Family History   Problem Relation Age of Onset    Hypertension Mother    Seven Ramirez Arthritis Mother     Diabetes Father     Other Sister         renal cell carcinoma    Diabetes Other     Factor V Leiden deficiency Other        Physical Exam  Vitals and nursing note reviewed  Constitutional:       General: She is not in acute distress  Appearance: She is not diaphoretic  HENT:      Head: Normocephalic and atraumatic  Eyes:      Conjunctiva/sclera: Conjunctivae normal    Cardiovascular:      Rate and Rhythm: Normal rate and regular rhythm  Pulses: Intact distal pulses  Heart sounds: Normal heart sounds  Pulmonary:      Effort: Pulmonary effort is normal       Breath sounds: Normal breath sounds  Abdominal:      General: Bowel sounds are normal       Palpations: Abdomen is soft  Musculoskeletal:         General: Normal range of motion  Cervical back: Normal range of motion and neck supple  Skin:     General: Skin is warm and dry  Neurological:      Mental Status: She is alert and oriented to person, place, and time  Vitals: There were no vitals taken for this visit  Wt Readings from Last 3 Encounters:   05/23/22 82 4 kg (181 lb 10 5 oz)   05/11/22 86 2 kg (190 lb)   03/28/22 86 kg (189 lb 9 5 oz)         Labs & Results:  Lab Results   Component Value Date    WBC 10 78 (H) 05/23/2022    HGB 10 1 (L) 05/23/2022    HCT 32 7 (L) 05/23/2022    MCV 80 (L) 05/23/2022     (H) 05/23/2022     BNP   Date Value Ref Range Status   05/19/2022 454 (H) 0 - 100 pg/mL Final   05/11/2022 342 (H) 0 - 100 pg/mL Final   03/27/2022 280 (H) 1 - 100 pg/mL Final     No components found for: CHEM    No results found for this or any previous visit  No results found for this or any previous visit  This note was completed in part utilizing [employer] direct voice recognition software  Grammatical errors, random word insertion, spelling mistakes, and incomplete sentences may be an occasional consequence of the system secondary to software limitations, ambient noise and hardware issues  At the time of dictation, efforts were made to edit, clarify and /or correct errors  Please read the chart carefully and recognize, using context, where substitutions have occurred    If you have any questions or concerns about the context, text or information contained within the body of this dictation, please contact myself, the provider, for further clarification

## 2022-06-08 DIAGNOSIS — I50.43 ACUTE ON CHRONIC COMBINED SYSTOLIC (CONGESTIVE) AND DIASTOLIC (CONGESTIVE) HEART FAILURE (HCC): ICD-10-CM

## 2022-06-08 DIAGNOSIS — I50.42 CHRONIC COMBINED SYSTOLIC (CONGESTIVE) AND DIASTOLIC (CONGESTIVE) HEART FAILURE (HCC): Primary | ICD-10-CM

## 2022-06-08 RX ORDER — POTASSIUM CHLORIDE 20 MEQ/1
40 TABLET, EXTENDED RELEASE ORAL 2 TIMES DAILY
Qty: 120 TABLET | Refills: 1 | Status: SHIPPED | OUTPATIENT
Start: 2022-06-08 | End: 2022-07-05 | Stop reason: ALTCHOICE

## 2022-06-08 RX ORDER — BUMETANIDE 2 MG/1
2 TABLET ORAL 2 TIMES DAILY
Qty: 60 TABLET | Refills: 1 | Status: SHIPPED | OUTPATIENT
Start: 2022-06-08 | End: 2022-07-05 | Stop reason: SDUPTHER

## 2022-06-08 RX ORDER — CALCIUM CARBONATE/VITAMIN D3 500-10/5ML
400 LIQUID (ML) ORAL 2 TIMES DAILY
Qty: 60 TABLET | Refills: 1 | Status: SHIPPED | OUTPATIENT
Start: 2022-06-08 | End: 2022-06-29

## 2022-06-13 ENCOUNTER — HOSPITAL ENCOUNTER (EMERGENCY)
Facility: HOSPITAL | Age: 57
Discharge: HOME | End: 2022-06-13
Attending: EMERGENCY MEDICINE | Admitting: EMERGENCY MEDICINE
Payer: COMMERCIAL

## 2022-06-13 VITALS
SYSTOLIC BLOOD PRESSURE: 129 MMHG | TEMPERATURE: 97.9 F | HEART RATE: 77 BPM | DIASTOLIC BLOOD PRESSURE: 96 MMHG | OXYGEN SATURATION: 100 % | RESPIRATION RATE: 24 BRPM

## 2022-06-13 DIAGNOSIS — R09.89 GLOBUS SENSATION: Primary | ICD-10-CM

## 2022-06-13 DIAGNOSIS — R13.10 DYSPHAGIA: ICD-10-CM

## 2022-06-13 PROCEDURE — 99284 EMERGENCY DEPT VISIT MOD MDM: CPT

## 2022-06-13 PROCEDURE — 99284 EMERGENCY DEPT VISIT MOD MDM: CPT | Performed by: EMERGENCY MEDICINE

## 2022-06-14 NOTE — ED PROVIDER NOTES
History  Chief Complaint   Patient presents with    Shortness of Breath     Pt states she wants to know if she can get her trach out  Pt states it is giving her too much anxiety and panic attacks  Pt states she feels like she is always choking  Pt returns to ER with globus sensation and dysphagia, a feeling that food gets stuck around the level of her trach  Sometime regurgitates solids, sometimes they go down  Usually tolerates liquids  She attributes this to her trach and requests it be removed  No cp/sob/f/c/nausea/abdo pain/urinary/bowel symp  Pt says she is having panic attacks provoked by this  Denies si/hi/ah/vh            Prior to Admission Medications   Prescriptions Last Dose Informant Patient Reported? Taking?    Benzocaine-Menthol 15-2 6 MG LOZG  Self No No   Sig: Apply 1 lozenge to the mouth or throat 4 (four) times a day as needed (sore throat)   Patient not taking: No sig reported   Blood Pressure Monitoring (Sphygmomanometer) MISC  Self No No   Sig: Use 2 (two) times a day   Patient not taking: No sig reported   Insulin Pen Needle (Novofine Pen Needle) 32G X 6 MM MISC  Self No No   Sig: Use 3 (three) times a day with meals   Insulin Pen Needle 31G X 6 MM MISC  Self Yes No   Si Device by Does not apply route 4 (four) times a day   LORazepam (Ativan) 1 mg tablet  Self No No   Sig: Take 0 5 tablets (0 5 mg total) by mouth 2 (two) times a day as needed for anxiety   Lancets MISC  Self Yes No   Sig: Use 1 Device 4 (four) times a day   Magnesium Oxide 400 MG CAPS   No No   Sig: Take 1 tablet (400 mg total) by mouth 2 (two) times a day   acetaminophen (TYLENOL) 160 mg/5 mL suspension  Self No No   Si 4 mL (975 mg total) by Per G Tube route every 6 (six) hours as needed for mild pain or fever   albuterol (2 5 mg/3 mL) 0 083 % nebulizer solution  Self No No   Sig: Take 3 mL (2 5 mg total) by nebulization every 4 (four) hours as needed for wheezing or shortness of breath   amiodarone 100 mg tablet Self No No   Sig: Take 1 tablet (100 mg total) by mouth daily with breakfast   apixaban (ELIQUIS) 5 mg  Self No No   Sig: Take 1 tablet (5 mg total) by mouth 2 (two) times a day   atorvastatin (LIPITOR) 40 mg tablet  Self No No   Sig: Take 1 tablet (40 mg total) by mouth daily   bumetanide (BUMEX) 2 mg tablet   No No   Sig: Take 1 tablet (2 mg total) by mouth 2 (two) times a day   chlorhexidine (PERIDEX) 0 12 % solution  Self No No   Sig: Apply 15 mL to the mouth or throat every 12 (twelve) hours   escitalopram (LEXAPRO) 10 mg tablet  Self No No   Sig: TAKE 1 TABLET BY MOUTH EVERY DAY   famotidine (PEPCID) 20 mg/2 5 mL oral suspension  Self No No   Sig: Take 2 5 mL (20 mg total) by mouth 2 (two) times a day   insulin aspart (NovoLOG FlexPen) 100 UNIT/ML injection pen  Self No No   Sig: Inject 2 Units under the skin 3 (three) times a day with meals   insulin glargine (Basaglar KwikPen) 100 units/mL injection pen  Self No No   Sig: Inject 12 Units under the skin in the morning     ipratropium (ATROVENT) 0 02 % nebulizer solution  Self No No   Sig: Take 2 5 mL (0 5 mg total) by nebulization 3 (three) times a day   levalbuterol (XOPENEX) 1 25 mg/0 5 mL nebulizer solution  Self No No   Sig: Take 0 5 mL (1 25 mg total) by nebulization 3 (three) times a day   losartan (COZAAR) 50 mg tablet   No No   Sig: Take 1 tablet (50 mg total) by mouth every 12 (twelve) hours   melatonin 3 mg  Self No No   Sig: Take 2 tablets (6 mg total) by mouth daily at bedtime   Patient not taking: No sig reported   metoprolol succinate (TOPROL-XL) 50 mg 24 hr tablet   No No   Sig: Take 1 tablet (50 mg total) by mouth every 12 (twelve) hours   polyethylene glycol (MIRALAX) 17 g packet  Self No No   Sig: Take 17 g by mouth daily   Patient not taking: No sig reported   potassium chloride (K-DUR,KLOR-CON) 20 mEq tablet   No No   Sig: Take 2 tablets (40 mEq total) by mouth 2 (two) times a day   pregabalin (LYRICA) 75 mg capsule  Self No No   Sig: TAKE ONE CAPSULE EVERY DAY   senna-docusate sodium (SENOKOT S) 8 6-50 mg per tablet  Self No No   Sig: Take 1 tablet by mouth daily at bedtime   Patient not taking: No sig reported   spironolactone (ALDACTONE) 100 mg tablet  Self No No   Sig: Take 1 tablet (100 mg total) by mouth daily   ticagrelor (BRILINTA) 90 MG  Self No No   Sig: Take 1 tablet (90 mg total) by mouth every 12 (twelve) hours   traZODone (DESYREL) 50 mg tablet  Self Yes No   Sig: TAKE 0 5 TABLET (25MG) BY MOUTH NIGHTLY AS NEEDED FOR SLEEP  Patient not taking: No sig reported      Facility-Administered Medications: None       Past Medical History:   Diagnosis Date    Anxiety     Aortic aneurysm ([facility] [address] )     Arthritis     Depression     Diabetes mellitus (HCC)     Fibromyalgia     GERD (gastroesophageal reflux disease)     GERD (gastroesophageal reflux disease)     H/O cardiovascular stress test 09/2018    no ischemia  EF 70%   H/O echocardiogram 01/2019    EF 60%  Mild LVH  trivial effusion   Hyperlipidemia     Hypertension     Migraines     Psychiatric disorder     anxiety    Uncontrolled hypertension 2/25/2015    Last Assessment & Plan:  BP today above goal <140/90, apparently asymptomatic  Prior BP elevations were attributed to not taking medication  Consider increased medication if persistent on f/u  Past Surgical History:   Procedure Laterality Date    BACK SURGERY      Lumbar epidural steroid injection    CARDIAC CATHETERIZATION N/A 10/22/2021    Procedure: Cardiac pci;  Surgeon: Shruthi Bucio MD;  [facility]; Service: Cardiology    CARDIAC CATHETERIZATION  10/22/2021    Procedure: Cardiac catheterization;  Surgeon: Shruthi Bucio MD;  [facility]; Service: Cardiology    CARDIAC CATHETERIZATION Left 10/27/2021    Procedure: Cardiac Left Heart Cath;  Surgeon: Romero Sevilla MD;  [facility];   Service: Cardiology    CARDIAC CATHETERIZATION N/A 10/27/2021 Procedure: Cardiac pci;  Surgeon: Winter Poon MD;  [facility]; Service: Cardiology    CARDIAC CATHETERIZATION N/A 10/28/2021    Procedure: Cardiac pci;  Surgeon: Genaro Gregg DO;  [facility]; Service: Cardiology    CARPAL TUNNEL RELEASE Left      SECTION      CHOLECYSTECTOMY      COLONOSCOPY      incomplete    COLONOSCOPY      EYE SURGERY      HYSTERECTOMY      Total    OVARIAN CYST REMOVAL      PEG W/TRACHEOSTOMY PLACEMENT N/A 2021    Procedure: TRACHEOSTOMY WITH INSERTION PEG TUBE;  Surgeon: Matt Fung DO;  Location[facility];  Service: General    TUBAL LIGATION      UPPER GASTROINTESTINAL ENDOSCOPY         Family History   Problem Relation Age of Onset    Hypertension Mother    Osker Ok Arthritis Mother     Diabetes Father     Other Sister         renal cell carcinoma    Diabetes Other     Factor V Leiden deficiency Other      I have reviewed and agree with the history as documented  E-Cigarette/Vaping    E-Cigarette Use Never User      E-Cigarette/Vaping Substances    Nicotine No     THC No     CBD No     Flavoring No     Other No     Unknown No      Social History     Tobacco Use    Smoking status: Former Smoker     Packs/day: 1 00     Years: 30 00     Pack years: 30 00     Types: Cigarettes    Smokeless tobacco: Never Used   Vaping Use    Vaping Use: Never used   Substance Use Topics    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   Drug use: Yes     Types: Marijuana     Comment: medical; seldom use       Review of Systems   Constitutional: Negative for fever  HENT: Positive for trouble swallowing  Negative for rhinorrhea  Eyes: Negative for visual disturbance  Respiratory: Negative for shortness of breath  Cardiovascular: Negative for chest pain  Gastrointestinal: Negative for abdominal pain, diarrhea and vomiting  Endocrine: Negative for polydipsia     Genitourinary: Negative for dysuria, frequency and hematuria  Musculoskeletal: Negative for neck stiffness  Skin: Negative for rash  Allergic/Immunologic: Negative for immunocompromised state  Neurological: Negative for speech difficulty, weakness and numbness  Psychiatric/Behavioral: Negative for suicidal ideas  Physical Exam  Physical Exam  Constitutional:       General: She is not in acute distress  HENT:      Head: Normocephalic and atraumatic  Right Ear: External ear normal       Left Ear: External ear normal       Nose: Nose normal       Mouth/Throat:      Comments: Trach in situ, site appears healthy  Eyes:      Conjunctiva/sclera: Conjunctivae normal    Neck:      Vascular: No JVD  Cardiovascular:      Rate and Rhythm: Normal rate and regular rhythm  Heart sounds: Normal heart sounds  No murmur heard  Pulmonary:      Effort: Pulmonary effort is normal       Breath sounds: Normal breath sounds  Abdominal:      General: Bowel sounds are normal       Palpations: Abdomen is soft  There is no mass  Tenderness: There is no abdominal tenderness  There is no guarding  Musculoskeletal:         General: No swelling or tenderness  Cervical back: Normal range of motion and neck supple  Right lower leg: No edema  Left lower leg: No edema  Lymphadenopathy:      Cervical: No cervical adenopathy  Skin:     General: Skin is warm and dry  Capillary Refill: Capillary refill takes less than 2 seconds  Neurological:      General: No focal deficit present  Mental Status: She is alert and oriented to person, place, and time  Psychiatric:         Mood and Affect: Mood is anxious           Vital Signs  ED Triage Vitals   Temperature Pulse Respirations Blood Pressure SpO2   06/13/22 2004 06/13/22 2004 06/13/22 2004 06/13/22 2010 06/13/22 2004   97 9 °F (36 6 °C) 77 (!) 24 129/96 100 %      Temp Source Heart Rate Source Patient Position - Orthostatic VS BP Location FiO2 (%)   06/13/22 2004 -- 06/13/22 2010 06/13/22 2010 --   Axillary  Sitting Left arm       Pain Score       06/13/22 2004       No Pain           Vitals:    06/13/22 2004 06/13/22 2010   BP:  129/96   Pulse: 77    Patient Position - Orthostatic VS:  Sitting         Visual Acuity      ED Medications  Medications - No data to display    Diagnostic Studies  Results Reviewed     None                 No orders to display              Procedures  Procedures         ED Course                                             MDM  Number of Diagnoses or Management Options  Dysphagia  Globus sensation  Diagnosis management comments: Pt was previously referred to ENT to review her globus sensation and dysphagia  She did not follow up  She says someone threw away her discharge papers  I believe ENT outpatient consult is the most important next step  No emergent findings on H&P  DC with ENT referral       Disposition  Final diagnoses:   Globus sensation   Dysphagia     Time reflects when diagnosis was documented in both MDM as applicable and the Disposition within this note     Time User Action Codes Description Comment    6/13/2022  8:17 PM Sharlene Villalobos Add [R09 89] Globus sensation     6/13/2022  8:17 PM Sharlene Villalobos Add [R13 10] Dysphagia       ED Disposition     ED Disposition   Discharge    Condition   Stable    Date/Time   Mon Jun 13, 2022  8:17 PM    Comment   Kelli Red discharge to home/self care  Follow-up Information     Follow up With Specialties Details Why Contact Info Additional Information     [facility] Schedule an appointment as soon as possible for a visit in 1 day  [facility] [address] [address]  [address] [facility], [address] [place] [address], [id number], [phone]          Patient's Medications   Discharge Prescriptions    No medications on file       No discharge procedures on file      PDMP Review       Value Time User PDMP Reviewed  Yes 4/7/2022 10:21 AM Seema Gold MD          ED Provider  Electronically Signed by           Shahid Boyd MD  06/13/22 2021

## 2022-06-14 NOTE — TELEPHONE ENCOUNTER
Dr Godfrey Renee did review pt chart and per Dr Britney Ely from ID standpoint to proceed with procedure  Chart review done, no formal consult necessary

## 2022-06-18 DIAGNOSIS — I50.22 CHRONIC SYSTOLIC HEART FAILURE (HCC): ICD-10-CM

## 2022-06-18 DIAGNOSIS — I48.91 ATRIAL FIBRILLATION, UNSPECIFIED TYPE (HCC): ICD-10-CM

## 2022-06-18 DIAGNOSIS — I25.10 CORONARY ARTERY DISEASE INVOLVING NATIVE CORONARY ARTERY OF NATIVE HEART WITHOUT ANGINA PECTORIS: ICD-10-CM

## 2022-06-20 ENCOUNTER — IN HOME VISIT (OUTPATIENT)
Dept: FAMILY MEDICINE CLINIC | Facility: CLINIC | Age: 57
End: 2022-06-20
Payer: COMMERCIAL

## 2022-06-20 DIAGNOSIS — F41.9 ANXIETY: ICD-10-CM

## 2022-06-20 DIAGNOSIS — Z93.0 TRACHEOSTOMY IN PLACE (HCC): Primary | ICD-10-CM

## 2022-06-20 PROCEDURE — 99213 OFFICE O/P EST LOW 20 MIN: CPT | Performed by: FAMILY MEDICINE

## 2022-06-20 RX ORDER — LORAZEPAM 1 MG/1
0.5 TABLET ORAL EVERY 8 HOURS PRN
Qty: 30 TABLET | Refills: 0 | Status: ON HOLD | OUTPATIENT
Start: 2022-06-20

## 2022-06-20 RX ORDER — SPIRONOLACTONE 100 MG/1
TABLET, FILM COATED ORAL
Qty: 90 TABLET | Refills: 0 | Status: ON HOLD | OUTPATIENT
Start: 2022-06-20

## 2022-06-20 RX ORDER — AMIODARONE HYDROCHLORIDE 100 MG/1
TABLET ORAL
Qty: 90 TABLET | Refills: 0 | Status: ON HOLD | OUTPATIENT
Start: 2022-06-20

## 2022-06-20 RX ORDER — TICAGRELOR 90 MG/1
TABLET ORAL
Qty: 180 TABLET | Refills: 0 | Status: ON HOLD | OUTPATIENT
Start: 2022-06-20

## 2022-06-20 NOTE — PROGRESS NOTES
Home Visit    Assessment/Plan:       Problem List Items Addressed This Visit        Other    Anxiety     -Patient endorses continued anxiety despite SSRI and BID PRN ativan 0 5 mg  -Will increase ativan to TID PRN for now but anticipate patient would likely benefit from better baseline control of anxiety, will consider adding buspar in the future in hopes of down-titrating ativan to cessation  -Multiple triggers for anxiety, including constant fear related to patient's health and uncertainty regarding need for possible life-long trache, as well as unaddressed post-traumatic emotions related to having coded several times during past hospitalization and being resuscitated   -Declines referral for counseling at this time, will continue to encourage at each visit         Tracheostomy in place [facility]) - Primary     -At her baseline 10L continuous supplemental O2  -Has 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 6/21/22  -Encouraged patient to call and schedule appt with Pulmonology, contact info given again                 Subjective:      Patient ID: Jane Hicks is a 65 y o  female  HPI  Patient seen and examined in her home as a follow up visit today  She remains with trach collar on 10L continuous supplemental O2  Denies knowledge of any hypoxia episodes as she does not have a pulse oximeter at home, but she is planning to get one soon  Reports she has an appointment tomorrow with ENT  Otherwise she is at her baseline without any new complaints; she does complain of persistent anxiety and would like to be able to take the Ativan 3 times a day if needed if she has some breakthrough episodes during the day of anxiety and panic, mostly related to her overall health and not knowing whether she will have to have the trach forever  Also anxious regarding some difficulty swallowing  She is hoping the ENT appointment tomorrow may give some guidance in this regard   Some shortness of breath, but it is intermittent and often related to anxiety per patient  Was given referral and contact information several times for outpatient Pulmonology but has not yet scheduled appt  She is cared for by her son and his significant other who do the suctioning and trach care; no acute issues  Patient is adherent to all medications and denies any chest pain at this time  Following with Cardiology, saw them earlier in the month on 6/6/22, wearing LifeVest today and reports full adherence  Has not yet followed up with EP regarding ICD placement  The following portions of the patient's history were reviewed and updated as appropriate: allergies, current medications, past family history, past medical history, past social history, past surgical history and problem list     Review of Systems   Constitutional: Positive for fatigue  HENT:        Some dysphagia with thinner textures   Respiratory: Positive for shortness of breath  Negative for cough, chest tightness and wheezing  Cardiovascular: Negative for chest pain, palpitations and leg swelling  Musculoskeletal: Positive for back pain  Back pain/radiculopathy at baseline, on lyrica         Objective:    /70 (BP Location: Left arm)   Pulse 70   Resp 16        Physical Exam  HENT:      Head: Normocephalic and atraumatic  Cardiovascular:      Rate and Rhythm: Normal rate  Comments: Life Vest in place  Pulmonary:      Effort: No respiratory distress  Comments: Trach collar in place on 10L O2  Abdominal:      General: Abdomen is flat  Bowel sounds are normal    Musculoskeletal:      Right lower leg: No edema  Left lower leg: No edema  Neurological:      Mental Status: She is alert     Psychiatric:      Comments: Mildly anxious

## 2022-06-20 NOTE — TELEPHONE ENCOUNTER
Sent refill and increased to 0 5 tablet PO TID PRN as discussed with patient during home visit today  Increased dispense amount to 30 tabs which gives her 60 total doses to use over the next 30 days  PDMP reviewed

## 2022-06-23 ENCOUNTER — APPOINTMENT (EMERGENCY)
Dept: RADIOLOGY | Facility: HOSPITAL | Age: 57
End: 2022-06-23
Payer: COMMERCIAL

## 2022-06-23 ENCOUNTER — HOSPITAL ENCOUNTER (EMERGENCY)
Facility: HOSPITAL | Age: 57
Discharge: HOME | End: 2022-06-23
Attending: INTERNAL MEDICINE | Admitting: INTERNAL MEDICINE
Payer: COMMERCIAL

## 2022-06-23 VITALS
SYSTOLIC BLOOD PRESSURE: 154 MMHG | HEART RATE: 72 BPM | TEMPERATURE: 97.1 F | RESPIRATION RATE: 18 BRPM | DIASTOLIC BLOOD PRESSURE: 88 MMHG | OXYGEN SATURATION: 97 %

## 2022-06-23 DIAGNOSIS — I10 HYPERTENSION, UNSPECIFIED TYPE: ICD-10-CM

## 2022-06-23 DIAGNOSIS — R68.84 JAW PAIN: Primary | ICD-10-CM

## 2022-06-23 LAB
ALBUMIN SERPL BCP-MCNC: 3.4 G/DL (ref 3.5–5)
ALP SERPL-CCNC: 90 U/L (ref 34–104)
ALT SERPL W P-5'-P-CCNC: 7 U/L (ref 7–52)
ANION GAP SERPL CALCULATED.3IONS-SCNC: 10 MMOL/L (ref 4–13)
AST SERPL W P-5'-P-CCNC: 8 U/L (ref 13–39)
BASOPHILS # BLD AUTO: 0.02 THOUSANDS/ΜL (ref 0–0.1)
BASOPHILS NFR BLD AUTO: 0 % (ref 0–1)
BILIRUB SERPL-MCNC: 0.67 MG/DL (ref 0.2–1)
BUN SERPL-MCNC: 12 MG/DL (ref 5–25)
CALCIUM ALBUM COR SERPL-MCNC: 9.2 MG/DL (ref 8.3–10.1)
CALCIUM SERPL-MCNC: 8.7 MG/DL (ref 8.4–10.2)
CARDIAC TROPONIN I PNL SERPL HS: 19 NG/L (ref 8–18)
CHLORIDE SERPL-SCNC: 101 MMOL/L (ref 96–108)
CO2 SERPL-SCNC: 27 MMOL/L (ref 21–32)
CREAT SERPL-MCNC: 0.67 MG/DL (ref 0.6–1.3)
EOSINOPHIL # BLD AUTO: 0.06 THOUSAND/ΜL (ref 0–0.61)
EOSINOPHIL NFR BLD AUTO: 1 % (ref 0–6)
ERYTHROCYTE [DISTWIDTH] IN BLOOD BY AUTOMATED COUNT: 16.5 % (ref 11.6–15.1)
GFR SERPL CREATININE-BSD FRML MDRD: 98 ML/MIN/1.73SQ M
GLUCOSE SERPL-MCNC: 284 MG/DL (ref 65–140)
GLUCOSE SERPL-MCNC: 317 MG/DL (ref 65–140)
GLUCOSE SERPL-MCNC: 323 MG/DL (ref 65–140)
GLUCOSE SERPL-MCNC: 334 MG/DL (ref 65–140)
HCT VFR BLD AUTO: 32.6 % (ref 34.8–46.1)
HGB BLD-MCNC: 9.7 G/DL (ref 11.5–15.4)
IMM GRANULOCYTES # BLD AUTO: 0.02 THOUSAND/UL (ref 0–0.2)
IMM GRANULOCYTES NFR BLD AUTO: 0 % (ref 0–2)
LYMPHOCYTES # BLD AUTO: 0.98 THOUSANDS/ΜL (ref 0.6–4.47)
LYMPHOCYTES NFR BLD AUTO: 13 % (ref 14–44)
MCH RBC QN AUTO: 23.4 PG (ref 26.8–34.3)
MCHC RBC AUTO-ENTMCNC: 29.8 G/DL (ref 31.4–37.4)
MCV RBC AUTO: 79 FL (ref 82–98)
MONOCYTES # BLD AUTO: 0.56 THOUSAND/ΜL (ref 0.17–1.22)
MONOCYTES NFR BLD AUTO: 7 % (ref 4–12)
NEUTROPHILS # BLD AUTO: 6.14 THOUSANDS/ΜL (ref 1.85–7.62)
NEUTS SEG NFR BLD AUTO: 79 % (ref 43–75)
NRBC BLD AUTO-RTO: 0 /100 WBCS
PLATELET # BLD AUTO: 408 THOUSANDS/UL (ref 149–390)
PMV BLD AUTO: 9.7 FL (ref 8.9–12.7)
POTASSIUM SERPL-SCNC: 3.3 MMOL/L (ref 3.5–5.3)
PROT SERPL-MCNC: 7.3 G/DL (ref 6.4–8.4)
RBC # BLD AUTO: 4.15 MILLION/UL (ref 3.81–5.12)
SODIUM SERPL-SCNC: 138 MMOL/L (ref 135–147)
WBC # BLD AUTO: 7.78 THOUSAND/UL (ref 4.31–10.16)

## 2022-06-23 PROCEDURE — 99284 EMERGENCY DEPT VISIT MOD MDM: CPT

## 2022-06-23 PROCEDURE — 99285 EMERGENCY DEPT VISIT HI MDM: CPT | Performed by: INTERNAL MEDICINE

## 2022-06-23 PROCEDURE — 96374 THER/PROPH/DIAG INJ IV PUSH: CPT

## 2022-06-23 PROCEDURE — 82948 REAGENT STRIP/BLOOD GLUCOSE: CPT

## 2022-06-23 PROCEDURE — 80053 COMPREHEN METABOLIC PANEL: CPT | Performed by: INTERNAL MEDICINE

## 2022-06-23 PROCEDURE — 71045 X-RAY EXAM CHEST 1 VIEW: CPT

## 2022-06-23 PROCEDURE — 36415 COLL VENOUS BLD VENIPUNCTURE: CPT | Performed by: INTERNAL MEDICINE

## 2022-06-23 PROCEDURE — 85025 COMPLETE CBC W/AUTO DIFF WBC: CPT | Performed by: INTERNAL MEDICINE

## 2022-06-23 PROCEDURE — 93005 ELECTROCARDIOGRAM TRACING: CPT

## 2022-06-23 PROCEDURE — 96372 THER/PROPH/DIAG INJ SC/IM: CPT

## 2022-06-23 PROCEDURE — 84484 ASSAY OF TROPONIN QUANT: CPT | Performed by: INTERNAL MEDICINE

## 2022-06-23 RX ORDER — LABETALOL HYDROCHLORIDE 5 MG/ML
10 INJECTION, SOLUTION INTRAVENOUS ONCE
Status: COMPLETED | OUTPATIENT
Start: 2022-06-23 | End: 2022-06-23

## 2022-06-23 RX ORDER — ACETAMINOPHEN 325 MG/1
650 TABLET ORAL ONCE
Status: COMPLETED | OUTPATIENT
Start: 2022-06-23 | End: 2022-06-23

## 2022-06-23 RX ADMIN — INSULIN HUMAN 6 UNITS: 100 INJECTION, SOLUTION PARENTERAL at 16:48

## 2022-06-23 RX ADMIN — ACETAMINOPHEN 650 MG: 325 TABLET, FILM COATED ORAL at 16:36

## 2022-06-23 RX ADMIN — LABETALOL HYDROCHLORIDE 10 MG: 5 INJECTION, SOLUTION INTRAVENOUS at 16:37

## 2022-06-23 NOTE — DISCHARGE INSTRUCTIONS
Follow up with dr Georgette Bhandari  Follow up with your PMD   Labs Reviewed   CBC AND DIFFERENTIAL - Abnormal       Result Value Ref Range Status    WBC 7 78  4 31 - 10 16 Thousand/uL Final    RBC 4 15  3 81 - 5 12 Million/uL Final    Hemoglobin 9 7 (*) 11 5 - 15 4 g/dL Final    Hematocrit 32 6 (*) 34 8 - 46 1 % Final    MCV 79 (*) 82 - 98 fL Final    MCH 23 4 (*) 26 8 - 34 3 pg Final    MCHC 29 8 (*) 31 4 - 37 4 g/dL Final    RDW 16 5 (*) 11 6 - 15 1 % Final    MPV 9 7  8 9 - 12 7 fL Final    Platelets 777 (*) 864 - 390 Thousands/uL Final    nRBC 0  /100 WBCs Final    Neutrophils Relative 79 (*) 43 - 75 % Final    Immat GRANS % 0  0 - 2 % Final    Lymphocytes Relative 13 (*) 14 - 44 % Final    Monocytes Relative 7  4 - 12 % Final    Eosinophils Relative 1  0 - 6 % Final    Basophils Relative 0  0 - 1 % Final    Neutrophils Absolute 6 14  1 85 - 7 62 Thousands/µL Final    Immature Grans Absolute 0 02  0 00 - 0 20 Thousand/uL Final    Lymphocytes Absolute 0 98  0 60 - 4 47 Thousands/µL Final    Monocytes Absolute 0 56  0 17 - 1 22 Thousand/µL Final    Eosinophils Absolute 0 06  0 00 - 0 61 Thousand/µL Final    Basophils Absolute 0 02  0 00 - 0 10 Thousands/µL Final   COMPREHENSIVE METABOLIC PANEL - Abnormal    Sodium 138  135 - 147 mmol/L Final    Potassium 3 3 (*) 3 5 - 5 3 mmol/L Final    Chloride 101  96 - 108 mmol/L Final    CO2 27  21 - 32 mmol/L Final    ANION GAP 10  4 - 13 mmol/L Final    BUN 12  5 - 25 mg/dL Final    Creatinine 0 67  0 60 - 1 30 mg/dL Final    Comment: Standardized to IDMS reference method    Glucose 284 (*) 65 - 140 mg/dL Final    Comment: If the patient is fasting, the ADA then defines impaired fasting glucose as > 100 mg/dL and diabetes as > or equal to 123 mg/dL  Specimen collection should occur prior to Sulfasalazine administration due to the potential for falsely depressed results   Specimen collection should occur prior to Sulfapyridine administration due to the potential for falsely elevated results  Calcium 8 7  8 4 - 10 2 mg/dL Final    Corrected Calcium 9 2  8 3 - 10 1 mg/dL Final    AST 8 (*) 13 - 39 U/L Final    Comment: Specimen collection should occur prior to Sulfasalazine administration due to the potential for falsely depressed results  ALT 7  7 - 52 U/L Final    Comment: Specimen collection should occur prior to Sulfasalazine administration due to the potential for falsely depressed results  Alkaline Phosphatase 90  34 - 104 U/L Final    Total Protein 7 3  6 4 - 8 4 g/dL Final    Albumin 3 4 (*) 3 5 - 5 0 g/dL Final    Total Bilirubin 0 67  0 20 - 1 00 mg/dL Final    eGFR 98  ml/min/1 73sq m Final    Narrative:     [place] guidelines for Chronic Kidney Disease (CKD):     Stage 1 with normal or high GFR (GFR > 90 mL/min/1 73 square meters)    Stage 2 Mild CKD (GFR = 60-89 mL/min/1 73 square meters)    Stage 3A Moderate CKD (GFR = 45-59 mL/min/1 73 square meters)    Stage 3B Moderate CKD (GFR = 30-44 mL/min/1 73 square meters)    Stage 4 Severe CKD (GFR = 15-29 mL/min/1 73 square meters)    Stage 5 End Stage CKD (GFR <15 mL/min/1 73 square meters)  Note: GFR calculation is accurate only with a steady state creatinine   HIGH SENSITIVITY TROPONIN I RANDOM - Abnormal    HS TnI random 19 (*) 8 - 18 ng/L Final    Comment:                                              Initial (time 0) result  If >=50 ng/L, Myocardial injury suggested ;  Type of myocardial injury and treatment strategy  to be determined  If 5-49 ng/L, a delta result at 2 hours and or 4 hours will be needed to further evaluate  If <4 ng/L, and chest pain has been >3 hours since onset, patient may qualify for discharge based on the HEART score in the ED  If <5 ng/L and <3hours since onset of chest pain, a delta result at 2 hours will be needed to further evaluate  HS Troponin 99th Percentile URL of a Health Population=12 ng/L with a 95% Confidence Interval of 8-18 ng/L      Second Troponin (time 2 hours)  If calculated delta >= 20 ng/L,  Myocardial injury suggested ; Type of myocardial injury and treatment strategy to be determined  If 5-49 ng/L and the calculated delta is 5-19 ng/L, consult medical service for evaluation  Continue evaluation for ischemia on ecg and other possible etiology and repeat hs troponin at 4 hours  If delta is <5 ng/L at 2 hours, consider discharge based on risk stratification via the HEART score (if in ED), or RALPH risk score in IP/Observation  HS Troponin 99th Percentile URL of a Health Population=12 ng/L with a 95% Confidence Interval of 8-18 ng/L  POCT GLUCOSE - Abnormal    POC Glucose 334 (*) 65 - 140 mg/dl Final   POCT GLUCOSE - Abnormal    POC Glucose 323 (*) 65 - 140 mg/dl Final    Comment: CRITICAL VALUE NOTED   POCT GLUCOSE - Abnormal    POC Glucose 317 (*) 65 - 140 mg/dl Final     XR chest portable   Final Result      Mild cardiomegaly with interval development of vascular congestion and bilateral pleural effusions left greater than right, likely on the basis of CHF  The study was marked in [facility] for immediate notification  Workstation performed: [id number]           I have reviewed the labs and results with you  Please return to emergency department with any fever, chills, persistent vomiting, worsening pain, chest pain , shortness of breath,episodes of passing out, or lightheadedness, or any worsening symptoms or concerns

## 2022-06-23 NOTE — ED PROVIDER NOTES
History  Chief Complaint   Patient presents with    Jaw Pain     Trached patient presents with multiple complaints today, including left sided jaw pain and ear pain, pain with swallowing, states she is having trouble breathing (100%, on chronic 10L trach mask), feeling like food is getting stuck, pt also c/o of daily headaches     This is 58y old came for multiple symptoms which she came to er before for the same  Pt stated she feel food stuck to her throat, and she has jaw pain, and has difficulty breathing   Pt has tracheostomy and her PO  100% ON tracheostomy 10L/min  Pt also complaints of HA  Pt has life vest  On arrival /100 but pt did not take her BP meds today  Pt is on metoprolol 100mg daily  Pt went to ENT dr Mariya Echeverria who ordered disposable inner tube trachostomy   And told her son once it arrive call his office so he will put it  Pt in no distress and she is breathing comfortable  Pt has extensive medical problem and she went into cardiac arrest few month ago  pt had been to er for the same complaints this month and last month and has CT of soft tissue of the neck on 6/2/22 and shows; Diffuse circumferential soft tissue thickening of posterior pharyngeal wall extending into the laryngeal airway with associated severe laryngeal airway narrowing above the tracheostomy tube which is in the upper trachea  Question chronic inflammatory   changes  Consider direct visualization by ENT for further evaluation      Findings suggestive of multifocal pneumonia (given positive history of Covid-19) on a background of pulmonary edema and chronic pulmonary cystic lung disease  Differential includes aspiration (given tracheostomy tube)     Mildly enlarged bilateral upper jugular chain cervical lymphadenopathy, likely reactive lymphadenopathy  Recommend clinical follow-up to resolution      Unchanged 4 2 cm aneurysm of ascending thoracic aorta        Pt also has ct chest abdomen pelvis on 5/19/22 and shows  Pulmonary edema likely secondary to CHF  Severe cardiomegaly      Unchanged 42 mm ascending aortic aneurysm     No acute findings in the abdomen or pelvis  Prior to Admission Medications   Prescriptions Last Dose Informant Patient Reported? Taking? Benzocaine-Menthol 15-2 6 MG LOZG  Self No No   Sig: Apply 1 lozenge to the mouth or throat 4 (four) times a day as needed (sore throat)   Patient not taking: No sig reported   Blood Pressure Monitoring (Sphygmomanometer) MISC   No No   Sig: Use 2 (two) times a day   Patient not taking: No sig reported   Brilinta 90 MG   No No   Sig: TAKE 1 TABLET BY MOUTH EVERY 12 HOURS  Insulin Pen Needle (Novofine Pen Needle) 32G X 6 MM MISC  Self No No   Sig: Use 3 (three) times a day with meals   Insulin Pen Needle 31G X 6 MM MISC  Self Yes No   Si Device by Does not apply route 4 (four) times a day   LORazepam (Ativan) 1 mg tablet   No No   Sig: Take 0 5 tablets (0 5 mg total) by mouth every 8 (eight) hours as needed for anxiety or sleep   Lancets MISC  Self Yes No   Sig: Use 1 Device 4 (four) times a day   Magnesium Oxide 400 MG CAPS   No No   Sig: Take 1 tablet (400 mg total) by mouth 2 (two) times a day   acetaminophen (TYLENOL) 160 mg/5 mL suspension  Self No No   Si 4 mL (975 mg total) by Per G Tube route every 6 (six) hours as needed for mild pain or fever   albuterol (2 5 mg/3 mL) 0 083 % nebulizer solution  Self No No   Sig: Take 3 mL (2 5 mg total) by nebulization every 4 (four) hours as needed for wheezing or shortness of breath   amiodarone 100 mg tablet   No No   Sig: TAKE 1 TABLET BY MOUTH DAILY WITH BREAKFAST     apixaban (ELIQUIS) 5 mg  Self No No   Sig: Take 1 tablet (5 mg total) by mouth 2 (two) times a day   atorvastatin (LIPITOR) 40 mg tablet  Self No No   Sig: Take 1 tablet (40 mg total) by mouth daily   bumetanide (BUMEX) 2 mg tablet   No No   Sig: Take 1 tablet (2 mg total) by mouth 2 (two) times a day   chlorhexidine (PERIDEX) 0 12 % solution  Self No No   Sig: Apply 15 mL to the mouth or throat every 12 (twelve) hours   escitalopram (LEXAPRO) 10 mg tablet  Self No No   Sig: TAKE 1 TABLET BY MOUTH EVERY DAY   famotidine (PEPCID) 20 mg/2 5 mL oral suspension  Self No No   Sig: Take 2 5 mL (20 mg total) by mouth 2 (two) times a day   insulin aspart (NovoLOG FlexPen) 100 UNIT/ML injection pen  Self No No   Sig: Inject 2 Units under the skin 3 (three) times a day with meals   insulin glargine (Basaglar KwikPen) 100 units/mL injection pen  Self No No   Sig: Inject 12 Units under the skin in the morning     ipratropium (ATROVENT) 0 02 % nebulizer solution  Self No No   Sig: Take 2 5 mL (0 5 mg total) by nebulization 3 (three) times a day   levalbuterol (XOPENEX) 1 25 mg/0 5 mL nebulizer solution  Self No No   Sig: Take 0 5 mL (1 25 mg total) by nebulization 3 (three) times a day   losartan (COZAAR) 50 mg tablet   No No   Sig: Take 1 tablet (50 mg total) by mouth every 12 (twelve) hours   magnesium Oxide (MAG-OX) 400 mg TABS   Yes No   Sig: TAKE 1 TABLET (400 MG TOTAL) BY MOUTH 2 TIMES A DAY   melatonin 3 mg  Self No No   Sig: Take 2 tablets (6 mg total) by mouth daily at bedtime   Patient not taking: No sig reported   metoprolol succinate (TOPROL-XL) 50 mg 24 hr tablet   No No   Sig: Take 1 tablet (50 mg total) by mouth every 12 (twelve) hours   pantoprazole (PROTONIX) 40 mg tablet   No No   Sig: Take 1 tablet (40 mg total) by mouth daily   polyethylene glycol (MIRALAX) 17 g packet  Self No No   Sig: Take 17 g by mouth daily   Patient not taking: No sig reported   potassium chloride (K-DUR,KLOR-CON) 20 mEq tablet   No No   Sig: Take 2 tablets (40 mEq total) by mouth 2 (two) times a day   pregabalin (LYRICA) 75 mg capsule  Self No No   Sig: TAKE ONE CAPSULE EVERY DAY   senna-docusate sodium (SENOKOT S) 8 6-50 mg per tablet  Self No No   Sig: Take 1 tablet by mouth daily at bedtime   Patient not taking: No sig reported   spironolactone (ALDACTONE) 100 mg tablet   No No   Sig: TAKE 1 TABLET BY MOUTH EVERY DAY   traZODone (DESYREL) 50 mg tablet  Self Yes No   Sig: TAKE 0 5 TABLET (25MG) BY MOUTH NIGHTLY AS NEEDED FOR SLEEP  Patient not taking: No sig reported      Facility-Administered Medications: None       Past Medical History:   Diagnosis Date    Anxiety     Aortic aneurysm ([facility] [address] )     Arthritis     Depression     Diabetes mellitus (HCC)     Fibromyalgia     GERD (gastroesophageal reflux disease)     GERD (gastroesophageal reflux disease)     H/O cardiovascular stress test 2018    no ischemia  EF 70%   H/O echocardiogram 2019    EF 60%  Mild LVH  trivial effusion   Hyperlipidemia     Hypertension     Migraines     Psychiatric disorder     anxiety    Uncontrolled hypertension 2015    Last Assessment & Plan:  BP today above goal <140/90, apparently asymptomatic  Prior BP elevations were attributed to not taking medication  Consider increased medication if persistent on f/u  Past Surgical History:   Procedure Laterality Date    BACK SURGERY      Lumbar epidural steroid injection    CARDIAC CATHETERIZATION N/A 10/22/2021    Procedure: Cardiac pci;  Surgeon: Yann Gonzalez MD;  [facility]; Service: Cardiology    CARDIAC CATHETERIZATION  10/22/2021    Procedure: Cardiac catheterization;  Surgeon: Yann Gonzalez MD;  [facility]; Service: Cardiology    CARDIAC CATHETERIZATION Left 10/27/2021    Procedure: Cardiac Left Heart Cath;  Surgeon: Freda Louis MD;  [facility]; Service: Cardiology    CARDIAC CATHETERIZATION N/A 10/27/2021    Procedure: Cardiac pci;  Surgeon: Freda Louis MD;  [facility]; Service: Cardiology    CARDIAC CATHETERIZATION N/A 10/28/2021    Procedure: Cardiac pci;  Surgeon: Ernesto Beebe DO;  [facility];   Service: Cardiology    CARPAL TUNNEL RELEASE Left      SECTION      CHOLECYSTECTOMY      COLONOSCOPY      incomplete    COLONOSCOPY      EYE SURGERY      HYSTERECTOMY      Total    OVARIAN CYST REMOVAL      PEG W/TRACHEOSTOMY PLACEMENT N/A 11/12/2021    Procedure: TRACHEOSTOMY WITH INSERTION PEG TUBE;  Surgeon: Krzysztof Tran To, DO;  Location: [facility];  Service: General    TUBAL LIGATION      UPPER GASTROINTESTINAL ENDOSCOPY         Family History   Problem Relation Age of Onset    Hypertension Mother    Maximiliano Osorio Arthritis Mother     Diabetes Father     Other Sister         renal cell carcinoma    Diabetes Other     Factor V Leiden deficiency Other      I have reviewed and agree with the history as documented  E-Cigarette/Vaping    E-Cigarette Use Never User      E-Cigarette/Vaping Substances    Nicotine No     THC No     CBD No     Flavoring No     Other No     Unknown No      Social History     Tobacco Use    Smoking status: Former Smoker     Packs/day: 1 00     Years: 30 00     Pack years: 30 00     Types: Cigarettes    Smokeless tobacco: Never Used   Vaping Use    Vaping Use: Never used   Substance Use Topics    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   Drug use: Yes     Types: Marijuana     Comment: medical; seldom use       Review of Systems   Constitutional: Negative for fatigue and fever  HENT: Positive for ear pain and trouble swallowing  Negative for congestion, ear discharge, facial swelling, hearing loss, mouth sores, sinus pressure, sinus pain, sneezing, sore throat and tinnitus  Eyes: Negative for photophobia and visual disturbance  Respiratory: Negative for cough and shortness of breath  Cardiovascular: Negative for chest pain and palpitations  Gastrointestinal: Negative for abdominal pain, diarrhea, nausea and vomiting  Genitourinary: Negative for difficulty urinating, dysuria, flank pain and frequency     Musculoskeletal: Negative for back pain, gait problem, joint swelling, myalgias, neck pain and neck stiffness  Skin: Negative for color change, pallor and rash  Neurological: Negative for dizziness, syncope, speech difficulty, light-headedness and headaches  Hematological: Negative for adenopathy  Does not bruise/bleed easily  Psychiatric/Behavioral: Negative for agitation and behavioral problems  Physical Exam  Physical Exam  Vitals and nursing note reviewed  Constitutional:       General: She is not in acute distress  Appearance: She is well-developed  She is not ill-appearing, toxic-appearing or diaphoretic  HENT:      Head: Normocephalic and atraumatic  Right Ear: Ear canal normal       Left Ear: Ear canal normal       Nose: Nose normal  No congestion or rhinorrhea  Mouth/Throat:      Mouth: Mucous membranes are moist       Pharynx: No oropharyngeal exudate or posterior oropharyngeal erythema  Neck:      Vascular: No carotid bruit  Comments: Tracheostomy tube in place  Cardiovascular:      Rate and Rhythm: Normal rate and regular rhythm  Heart sounds: Normal heart sounds  No murmur heard  No friction rub  No gallop  Pulmonary:      Effort: Pulmonary effort is normal  No respiratory distress  Breath sounds: Normal breath sounds  No wheezing  Abdominal:      General: Bowel sounds are normal  There is no distension  Palpations: Abdomen is soft  There is no mass  Tenderness: There is no abdominal tenderness  There is no right CVA tenderness, left CVA tenderness, guarding or rebound  Hernia: No hernia is present  Musculoskeletal:         General: No swelling, tenderness, deformity or signs of injury  Normal range of motion  Cervical back: Normal range of motion and neck supple  No rigidity or tenderness  Right lower leg: No edema  Left lower leg: No edema  Lymphadenopathy:      Cervical: No cervical adenopathy  Skin:     General: Skin is warm and dry  Capillary Refill: Capillary refill takes less than 2 seconds  Coloration: Skin is not jaundiced or pale  Findings: No bruising, erythema, lesion or rash  Neurological:      General: No focal deficit present  Mental Status: She is alert and oriented to person, place, and time     Psychiatric:         Mood and Affect: Mood normal          Behavior: Behavior normal          Vital Signs  ED Triage Vitals   Temperature Pulse Respirations Blood Pressure SpO2   06/23/22 1452 06/23/22 1450 06/23/22 1450 06/23/22 1451 06/23/22 1450   97 7 °F (36 5 °C) 92 22 (!) 182/100 100 %      Temp Source Heart Rate Source Patient Position - Orthostatic VS BP Location FiO2 (%)   06/23/22 1452 06/23/22 1450 06/23/22 1450 06/23/22 1450 --   Oral Monitor Lying Left arm       Pain Score       --                  Vitals:    06/23/22 1451 06/23/22 1645 06/23/22 1728 06/23/22 1930   BP: (!) 182/100 166/93 161/90 154/88   Pulse:  71 70 72   Patient Position - Orthostatic VS:  Sitting  Lying         Visual Acuity      ED Medications  Medications   acetaminophen (TYLENOL) tablet 650 mg (650 mg Oral Given 6/23/22 1636)   labetalol (NORMODYNE) injection 10 mg (10 mg Intravenous Given 6/23/22 1637)   insulin regular (HumuLIN R,NovoLIN R) injection 6 Units (6 Units Subcutaneous Given 6/23/22 1648)       Diagnostic Studies  Results Reviewed     Procedure Component Value Units Date/Time    Fingerstick Glucose (POCT) [[id number]]  (Abnormal) Collected: 06/23/22 1909    Lab Status: Final result Updated: 06/23/22 1910     POC Glucose 317 mg/dl     Fingerstick Glucose (POCT) [[id number]]  (Abnormal) Collected: 06/23/22 1834    Lab Status: Final result Updated: 06/23/22 1835     POC Glucose 323 mg/dl     Fingerstick Glucose (POCT) [[id number]]  (Abnormal) Collected: 06/23/22 1732    Lab Status: Final result Updated: 06/23/22 1733     POC Glucose 334 mg/dl     High Sensitivity Troponin I Random [[id number]]  (Abnormal) Collected: 06/23/22 1540    Lab Status: Final result Specimen: Blood from Arm, Right Updated: 06/23/22 1611     HS TnI random 19 ng/L     Comprehensive metabolic panel [[id number]]  (Abnormal) Collected: 06/23/22 1540    Lab Status: Final result Specimen: Blood from Arm, Right Updated: 06/23/22 1607     Sodium 138 mmol/L      Potassium 3 3 mmol/L      Chloride 101 mmol/L      CO2 27 mmol/L      ANION GAP 10 mmol/L      BUN 12 mg/dL      Creatinine 0 67 mg/dL      Glucose 284 mg/dL      Calcium 8 7 mg/dL      Corrected Calcium 9 2 mg/dL      AST 8 U/L      ALT 7 U/L      Alkaline Phosphatase 90 U/L      Total Protein 7 3 g/dL      Albumin 3 4 g/dL      Total Bilirubin 0 67 mg/dL      eGFR 98 ml/min/1 73sq m     Narrative:      National Kidney Disease Foundation guidelines for Chronic Kidney Disease (CKD):     Stage 1 with normal or high GFR (GFR > 90 mL/min/1 73 square meters)    Stage 2 Mild CKD (GFR = 60-89 mL/min/1 73 square meters)    Stage 3A Moderate CKD (GFR = 45-59 mL/min/1 73 square meters)    Stage 3B Moderate CKD (GFR = 30-44 mL/min/1 73 square meters)    Stage 4 Severe CKD (GFR = 15-29 mL/min/1 73 square meters)    Stage 5 End Stage CKD (GFR <15 mL/min/1 73 square meters)  Note: GFR calculation is accurate only with a steady state creatinine    CBC and differential [[id number]]  (Abnormal) Collected: 06/23/22 1540    Lab Status: Final result Specimen: Blood from Arm, Right Updated: 06/23/22 1545     WBC 7 78 Thousand/uL      RBC 4 15 Million/uL      Hemoglobin 9 7 g/dL      Hematocrit 32 6 %      MCV 79 fL      MCH 23 4 pg      MCHC 29 8 g/dL      RDW 16 5 %      MPV 9 7 fL      Platelets 394 Thousands/uL      nRBC 0 /100 WBCs      Neutrophils Relative 79 %      Immat GRANS % 0 %      Lymphocytes Relative 13 %      Monocytes Relative 7 %      Eosinophils Relative 1 %      Basophils Relative 0 %      Neutrophils Absolute 6 14 Thousands/µL      Immature Grans Absolute 0 02 Thousand/uL      Lymphocytes Absolute 0 98 Thousands/µL      Monocytes Absolute 0 56 Thousand/µL      Eosinophils Absolute 0 06 Thousand/µL      Basophils Absolute 0 02 Thousands/µL                  XR chest portable   Final Result by Ishan Cordova MD (06/23 1551)      Mild cardiomegaly with interval development of vascular congestion and bilateral pleural effusions left greater than right, likely on the basis of CHF  The study was marked in [facility] for immediate notification  Workstation performed: [id number]                    Procedures  ECG 12 Lead Documentation Only    Date/Time: 6/23/2022 4:08 PM  Performed by: Joanna Hill MD  Authorized by: Joanna Hill MD     Indications / Diagnosis:  Jaw pain  ECG reviewed by me, the ED Provider: yes    Patient location:  ED  Previous ECG:     Previous ECG:  Compared to current    Comparison to cardiac monitor: Yes    Interpretation:     Interpretation: abnormal    Rate:     ECG rate:  76    ECG rate assessment: normal    Rhythm:     Rhythm: sinus rhythm    Ectopy:     Ectopy: none    Conduction:     Conduction: abnormal      Abnormal conduction: LAFB and non-specific intraventricular conduction delay    ST segments:     ST segments:  Non-specific  T waves:     T waves: non-specific               ED Course  ED Course as of 06/23/22 2258   Thu Jun 23, 2022   1603 Ct neck finding done on 6/2 reviewed with the pt and told her possible the feeling the food does not go down because of thickening of pharynx which is chronic now  1726 BP GO down to 166/93    1726 Pt will have appointment with her ENT to change tracheostomy tube once the disposable one arrived  1927 PT will go for swallow test on 7/21                                             MDM  Number of Diagnoses or Management Options  Hypertension, unspecified type  Jaw pain  Diagnosis management comments: This is 58y old came for having multiple symptoms which she has before and was addressed before  Pt has tracheostomy and she has % on 10L/MIN   Pt has jaw pain, HA, feel food stuck to neck    Pt has CT soft tissue of neck and it shows chronic narrowing of pharynx and larynx  Pt did not take her BP medications this morning  Pt in no distress sitting comfortable in no distress  BP go down to 163/93  Pt advised to follow up with her ENT,   Pt is feeling better and would like to go home  I rechecked the pt and undated on the results of the ED findings, including physical exam,diagnosis, and all abnormal test results  I discussed the plan of discharge with the pt and advised to follow up with PCP or to return to the ED for any new or worsening symptoms  pt understands and agrees with the plan of care  All questions and concerns have been addressed at this time  Amount and/or Complexity of Data Reviewed  Clinical lab tests: ordered and reviewed  Tests in the radiology section of CPT®: ordered and reviewed    Risk of Complications, Morbidity, and/or Mortality  Presenting problems: moderate  Diagnostic procedures: moderate  Management options: low        Disposition  Final diagnoses:   Jaw pain   Hypertension, unspecified type     Time reflects when diagnosis was documented in both MDM as applicable and the Disposition within this note     Time User Action Codes Description Comment    6/23/2022  7:19 PM Terence Serrato Add [R68 84] Jaw pain     6/23/2022  7:22 PM Qiana Hennessy Add [I10] Hypertension, unspecified type       ED Disposition     ED Disposition   Discharge    Condition   Stable    Date/Time   Thu Jun 23, 2022  7:24 PM    Comment   Kelli Red discharge to home/self care                 Follow-up Information     Follow up With Specialties Details Why Bebeto Ochoa MD Otolaryngology In 1 week  [address] [address]            Discharge Medication List as of 6/23/2022  7:24 PM      CONTINUE these medications which have NOT CHANGED    Details   acetaminophen (TYLENOL) 160 mg/5 mL suspension 30 4 mL (975 mg total) by Per G Tube route every 6 (six) hours as needed for mild pain or fever, Starting Tue 11/23/2021, No Print      albuterol (2 5 mg/3 mL) 0 083 % nebulizer solution Take 3 mL (2 5 mg total) by nebulization every 4 (four) hours as needed for wheezing or shortness of breath, Starting Thu 4/7/2022, Normal      amiodarone 100 mg tablet TAKE 1 TABLET BY MOUTH DAILY WITH BREAKFAST , Normal      apixaban (ELIQUIS) 5 mg Take 1 tablet (5 mg total) by mouth 2 (two) times a day, Starting Mon 3/21/2022, Normal      atorvastatin (LIPITOR) 40 mg tablet Take 1 tablet (40 mg total) by mouth daily, Starting Mon 3/21/2022, Normal      Benzocaine-Menthol 15-2 6 MG LOZG Apply 1 lozenge to the mouth or throat 4 (four) times a day as needed (sore throat), Starting Sat 5/14/2022, Normal      Blood Pressure Monitoring (Sphygmomanometer) MISC Use 2 (two) times a day, Starting Fri 4/30/2021, Normal      Brilinta 90 MG TAKE 1 TABLET BY MOUTH EVERY 12 HOURS , Normal      bumetanide (BUMEX) 2 mg tablet Take 1 tablet (2 mg total) by mouth 2 (two) times a day, Starting Wed 6/8/2022, Normal      chlorhexidine (PERIDEX) 0 12 % solution Apply 15 mL to the mouth or throat every 12 (twelve) hours, Starting Tue 11/23/2021, No Print      escitalopram (LEXAPRO) 10 mg tablet TAKE 1 TABLET BY MOUTH EVERY DAY, Normal      famotidine (PEPCID) 20 mg/2 5 mL oral suspension Take 2 5 mL (20 mg total) by mouth 2 (two) times a day, Starting Tue 11/23/2021, No Print      insulin aspart (NovoLOG FlexPen) 100 UNIT/ML injection pen Inject 2 Units under the skin 3 (three) times a day with meals, Starting Wed 3/23/2022, Normal      insulin glargine (Basaglar KwikPen) 100 units/mL injection pen Inject 12 Units under the skin in the morning , Starting Mon 5/23/2022, No Print      !! Insulin Pen Needle (Novofine Pen Needle) 32G X 6 MM MISC Use 3 (three) times a day with meals, Starting Wed 3/23/2022, Normal      !!  Insulin Pen Needle 31G X 6 MM MISC 1 Device by Does not apply route 4 (four) times a day, Starting Mon 6/5/2017, Historical Med      ipratropium (ATROVENT) 0 02 % nebulizer solution Take 2 5 mL (0 5 mg total) by nebulization 3 (three) times a day, Starting Tue 11/23/2021, No Print      Lancets MISC Use 1 Device 4 (four) times a day, Historical Med      levalbuterol (XOPENEX) 1 25 mg/0 5 mL nebulizer solution Take 0 5 mL (1 25 mg total) by nebulization 3 (three) times a day, Starting Tue 11/23/2021, No Print      LORazepam (Ativan) 1 mg tablet Take 0 5 tablets (0 5 mg total) by mouth every 8 (eight) hours as needed for anxiety or sleep, Starting Mon 6/20/2022, Normal      losartan (COZAAR) 50 mg tablet Take 1 tablet (50 mg total) by mouth every 12 (twelve) hours, Starting Mon 6/6/2022, No Print      magnesium Oxide (MAG-OX) 400 mg TABS TAKE 1 TABLET (400 MG TOTAL) BY MOUTH 2 TIMES A DAY, Historical Med      Magnesium Oxide 400 MG CAPS Take 1 tablet (400 mg total) by mouth 2 (two) times a day, Starting Wed 6/8/2022, Normal      melatonin 3 mg Take 2 tablets (6 mg total) by mouth daily at bedtime, Starting Tue 3/15/2022, Normal      metoprolol succinate (TOPROL-XL) 50 mg 24 hr tablet Take 1 tablet (50 mg total) by mouth every 12 (twelve) hours, Starting Mon 6/6/2022, Normal      pantoprazole (PROTONIX) 40 mg tablet Take 1 tablet (40 mg total) by mouth daily, Starting Tue 6/21/2022, Normal      polyethylene glycol (MIRALAX) 17 g packet Take 17 g by mouth daily, Starting Tue 11/23/2021, No Print      potassium chloride (K-DUR,KLOR-CON) 20 mEq tablet Take 2 tablets (40 mEq total) by mouth 2 (two) times a day, Starting Wed 6/8/2022, Normal      pregabalin (LYRICA) 75 mg capsule TAKE ONE CAPSULE EVERY DAY, Normal      senna-docusate sodium (SENOKOT S) 8 6-50 mg per tablet Take 1 tablet by mouth daily at bedtime, Starting Tue 11/23/2021, No Print      spironolactone (ALDACTONE) 100 mg tablet TAKE 1 TABLET BY MOUTH EVERY DAY, Normal      traZODone (DESYREL) 50 mg tablet TAKE 0 5 TABLET (25MG) BY MOUTH NIGHTLY AS NEEDED FOR SLEEP , Historical Med       !! - Potential duplicate medications found  Please discuss with provider  No discharge procedures on file      PDMP Review       Value Time User    PDMP Reviewed  Yes 6/20/2022  4:32 PM Linnette Gallegos MD          ED Provider  Electronically Signed by           Adan Suazo MD  06/23/22 5926

## 2022-06-24 LAB
ATRIAL RATE: 76 BPM
P AXIS: 23 DEGREES
PR INTERVAL: 154 MS
QRS AXIS: -49 DEGREES
QRSD INTERVAL: 120 MS
QT INTERVAL: 488 MS
QTC INTERVAL: 549 MS
T WAVE AXIS: 6 DEGREES
VENTRICULAR RATE: 76 BPM

## 2022-06-24 PROCEDURE — 93010 ELECTROCARDIOGRAM REPORT: CPT | Performed by: INTERNAL MEDICINE

## 2022-06-27 ENCOUNTER — APPOINTMENT (EMERGENCY)
Dept: RADIOLOGY | Facility: HOSPITAL | Age: 57
End: 2022-06-27
Payer: COMMERCIAL

## 2022-06-27 ENCOUNTER — HOSPITAL ENCOUNTER (OUTPATIENT)
Facility: HOSPITAL | Age: 57
Setting detail: OBSERVATION
Discharge: HOME HEALTH | End: 2022-06-29
Attending: EMERGENCY MEDICINE | Admitting: INTERNAL MEDICINE
Payer: COMMERCIAL

## 2022-06-27 DIAGNOSIS — F41.9 ANXIETY: ICD-10-CM

## 2022-06-27 DIAGNOSIS — M54.32 SCIATICA OF LEFT SIDE: ICD-10-CM

## 2022-06-27 DIAGNOSIS — R09.89 CHEST CONGESTION: Primary | ICD-10-CM

## 2022-06-27 DIAGNOSIS — E11.9 TYPE 2 DIABETES MELLITUS TREATED WITH INSULIN (HCC): ICD-10-CM

## 2022-06-27 DIAGNOSIS — I46.9 CARDIAC ARREST (HCC): ICD-10-CM

## 2022-06-27 DIAGNOSIS — Z79.4 TYPE 2 DIABETES MELLITUS TREATED WITH INSULIN (HCC): ICD-10-CM

## 2022-06-27 DIAGNOSIS — R06.02 SOB (SHORTNESS OF BREATH): ICD-10-CM

## 2022-06-27 DIAGNOSIS — E11.42 DIABETIC PERIPHERAL NEUROPATHY (HCC): ICD-10-CM

## 2022-06-27 DIAGNOSIS — I50.42 CHRONIC COMBINED SYSTOLIC (CONGESTIVE) AND DIASTOLIC (CONGESTIVE) HEART FAILURE (HCC): ICD-10-CM

## 2022-06-27 LAB
ALBUMIN SERPL BCP-MCNC: 3.3 G/DL (ref 3.5–5)
ALP SERPL-CCNC: 86 U/L (ref 34–104)
ALT SERPL W P-5'-P-CCNC: 5 U/L (ref 7–52)
ANION GAP SERPL CALCULATED.3IONS-SCNC: 9 MMOL/L (ref 4–13)
AST SERPL W P-5'-P-CCNC: 7 U/L (ref 13–39)
BASOPHILS # BLD AUTO: 0.02 THOUSANDS/ΜL (ref 0–0.1)
BASOPHILS NFR BLD AUTO: 0 % (ref 0–1)
BILIRUB SERPL-MCNC: 0.69 MG/DL (ref 0.2–1)
BUN SERPL-MCNC: 12 MG/DL (ref 5–25)
CALCIUM ALBUM COR SERPL-MCNC: 9.2 MG/DL (ref 8.3–10.1)
CALCIUM SERPL-MCNC: 8.6 MG/DL (ref 8.4–10.2)
CHLORIDE SERPL-SCNC: 100 MMOL/L (ref 96–108)
CO2 SERPL-SCNC: 29 MMOL/L (ref 21–32)
CREAT SERPL-MCNC: 0.73 MG/DL (ref 0.6–1.3)
EOSINOPHIL # BLD AUTO: 0.06 THOUSAND/ΜL (ref 0–0.61)
EOSINOPHIL NFR BLD AUTO: 1 % (ref 0–6)
ERYTHROCYTE [DISTWIDTH] IN BLOOD BY AUTOMATED COUNT: 16.3 % (ref 11.6–15.1)
GFR SERPL CREATININE-BSD FRML MDRD: 92 ML/MIN/1.73SQ M
GLUCOSE SERPL-MCNC: 295 MG/DL (ref 65–140)
HCT VFR BLD AUTO: 30.9 % (ref 34.8–46.1)
HGB BLD-MCNC: 9.5 G/DL (ref 11.5–15.4)
IMM GRANULOCYTES # BLD AUTO: 0.04 THOUSAND/UL (ref 0–0.2)
IMM GRANULOCYTES NFR BLD AUTO: 1 % (ref 0–2)
LYMPHOCYTES # BLD AUTO: 1.14 THOUSANDS/ΜL (ref 0.6–4.47)
LYMPHOCYTES NFR BLD AUTO: 13 % (ref 14–44)
MAGNESIUM SERPL-MCNC: 1.3 MG/DL (ref 1.9–2.7)
MCH RBC QN AUTO: 23.8 PG (ref 26.8–34.3)
MCHC RBC AUTO-ENTMCNC: 30.7 G/DL (ref 31.4–37.4)
MCV RBC AUTO: 77 FL (ref 82–98)
MONOCYTES # BLD AUTO: 0.57 THOUSAND/ΜL (ref 0.17–1.22)
MONOCYTES NFR BLD AUTO: 6 % (ref 4–12)
NEUTROPHILS # BLD AUTO: 7.01 THOUSANDS/ΜL (ref 1.85–7.62)
NEUTS SEG NFR BLD AUTO: 79 % (ref 43–75)
NRBC BLD AUTO-RTO: 0 /100 WBCS
PHOSPHATE SERPL-MCNC: 3.1 MG/DL (ref 2.7–4.5)
PLATELET # BLD AUTO: 453 THOUSANDS/UL (ref 149–390)
PMV BLD AUTO: 9.7 FL (ref 8.9–12.7)
POTASSIUM SERPL-SCNC: 2.8 MMOL/L (ref 3.5–5.3)
PROT SERPL-MCNC: 7.1 G/DL (ref 6.4–8.4)
RBC # BLD AUTO: 4 MILLION/UL (ref 3.81–5.12)
SODIUM SERPL-SCNC: 138 MMOL/L (ref 135–147)
WBC # BLD AUTO: 8.84 THOUSAND/UL (ref 4.31–10.16)

## 2022-06-27 PROCEDURE — 80053 COMPREHEN METABOLIC PANEL: CPT | Performed by: EMERGENCY MEDICINE

## 2022-06-27 PROCEDURE — 85025 COMPLETE CBC W/AUTO DIFF WBC: CPT | Performed by: EMERGENCY MEDICINE

## 2022-06-27 PROCEDURE — 99285 EMERGENCY DEPT VISIT HI MDM: CPT | Performed by: EMERGENCY MEDICINE

## 2022-06-27 PROCEDURE — 36415 COLL VENOUS BLD VENIPUNCTURE: CPT | Performed by: EMERGENCY MEDICINE

## 2022-06-27 PROCEDURE — 84484 ASSAY OF TROPONIN QUANT: CPT | Performed by: EMERGENCY MEDICINE

## 2022-06-27 PROCEDURE — 93005 ELECTROCARDIOGRAM TRACING: CPT

## 2022-06-27 PROCEDURE — 83880 ASSAY OF NATRIURETIC PEPTIDE: CPT | Performed by: EMERGENCY MEDICINE

## 2022-06-27 PROCEDURE — 83735 ASSAY OF MAGNESIUM: CPT | Performed by: EMERGENCY MEDICINE

## 2022-06-27 PROCEDURE — 71045 X-RAY EXAM CHEST 1 VIEW: CPT

## 2022-06-27 PROCEDURE — 99285 EMERGENCY DEPT VISIT HI MDM: CPT

## 2022-06-27 PROCEDURE — 83880 ASSAY OF NATRIURETIC PEPTIDE: CPT

## 2022-06-27 PROCEDURE — 84100 ASSAY OF PHOSPHORUS: CPT | Performed by: EMERGENCY MEDICINE

## 2022-06-27 RX ORDER — ACETAMINOPHEN 325 MG/1
650 TABLET ORAL ONCE
Status: COMPLETED | OUTPATIENT
Start: 2022-06-27 | End: 2022-06-27

## 2022-06-27 RX ORDER — LORAZEPAM 1 MG/1
1 TABLET ORAL ONCE
Status: COMPLETED | OUTPATIENT
Start: 2022-06-27 | End: 2022-06-27

## 2022-06-27 RX ADMIN — LORAZEPAM 1 MG: 1 TABLET ORAL at 21:26

## 2022-06-27 RX ADMIN — ACETAMINOPHEN 650 MG: 325 TABLET, FILM COATED ORAL at 21:26

## 2022-06-28 PROBLEM — R07.9 CHEST PAIN: Status: ACTIVE | Noted: 2022-06-28

## 2022-06-28 PROBLEM — E44.0 MODERATE PROTEIN-CALORIE MALNUTRITION (HCC): Status: ACTIVE | Noted: 2022-06-28

## 2022-06-28 PROBLEM — E83.42 HYPOMAGNESEMIA: Status: ACTIVE | Noted: 2022-06-28

## 2022-06-28 LAB
2HR DELTA HS TROPONIN: 1 NG/L
4HR DELTA HS TROPONIN: 0 NG/L
ANION GAP SERPL CALCULATED.3IONS-SCNC: 12 MMOL/L (ref 4–13)
ATRIAL RATE: 73 BPM
ATRIAL RATE: 78 BPM
BNP SERPL-MCNC: 440 PG/ML (ref 0–100)
BUN SERPL-MCNC: 13 MG/DL (ref 5–25)
CALCIUM SERPL-MCNC: 8.5 MG/DL (ref 8.4–10.2)
CARDIAC TROPONIN I PNL SERPL HS: 24 NG/L
CARDIAC TROPONIN I PNL SERPL HS: 24 NG/L
CARDIAC TROPONIN I PNL SERPL HS: 25 NG/L
CHLORIDE SERPL-SCNC: 97 MMOL/L (ref 96–108)
CO2 SERPL-SCNC: 26 MMOL/L (ref 21–32)
CREAT SERPL-MCNC: 0.78 MG/DL (ref 0.6–1.3)
ERYTHROCYTE [DISTWIDTH] IN BLOOD BY AUTOMATED COUNT: 16.5 % (ref 11.6–15.1)
GFR SERPL CREATININE-BSD FRML MDRD: 85 ML/MIN/1.73SQ M
GLUCOSE SERPL-MCNC: 347 MG/DL (ref 65–140)
GLUCOSE SERPL-MCNC: 383 MG/DL (ref 65–140)
HCT VFR BLD AUTO: 35.3 % (ref 34.8–46.1)
HGB BLD-MCNC: 10.6 G/DL (ref 11.5–15.4)
MAGNESIUM SERPL-MCNC: 1.8 MG/DL (ref 1.9–2.7)
MCH RBC QN AUTO: 23.7 PG (ref 26.8–34.3)
MCHC RBC AUTO-ENTMCNC: 30 G/DL (ref 31.4–37.4)
MCV RBC AUTO: 79 FL (ref 82–98)
NT-PROBNP SERPL-MCNC: 4179 PG/ML
P AXIS: 14 DEGREES
PLATELET # BLD AUTO: 417 THOUSANDS/UL (ref 149–390)
PMV BLD AUTO: 10.4 FL (ref 8.9–12.7)
POTASSIUM SERPL-SCNC: 3.6 MMOL/L (ref 3.5–5.3)
PR INTERVAL: 150 MS
PR INTERVAL: 164 MS
QRS AXIS: -49 DEGREES
QRS AXIS: 232 DEGREES
QRSD INTERVAL: 122 MS
QRSD INTERVAL: 124 MS
QT INTERVAL: 494 MS
QT INTERVAL: 510 MS
QTC INTERVAL: 561 MS
QTC INTERVAL: 563 MS
RBC # BLD AUTO: 4.47 MILLION/UL (ref 3.81–5.12)
SODIUM SERPL-SCNC: 135 MMOL/L (ref 135–147)
T WAVE AXIS: 165 DEGREES
T WAVE AXIS: 7 DEGREES
VENTRICULAR RATE: 73 BPM
VENTRICULAR RATE: 78 BPM
WBC # BLD AUTO: 8.43 THOUSAND/UL (ref 4.31–10.16)

## 2022-06-28 PROCEDURE — 93010 ELECTROCARDIOGRAM REPORT: CPT | Performed by: INTERNAL MEDICINE

## 2022-06-28 PROCEDURE — 84484 ASSAY OF TROPONIN QUANT: CPT

## 2022-06-28 PROCEDURE — 80048 BASIC METABOLIC PNL TOTAL CA: CPT

## 2022-06-28 PROCEDURE — 94664 DEMO&/EVAL PT USE INHALER: CPT

## 2022-06-28 PROCEDURE — 85027 COMPLETE CBC AUTOMATED: CPT

## 2022-06-28 PROCEDURE — 83735 ASSAY OF MAGNESIUM: CPT

## 2022-06-28 PROCEDURE — 36415 COLL VENOUS BLD VENIPUNCTURE: CPT | Performed by: EMERGENCY MEDICINE

## 2022-06-28 PROCEDURE — 93005 ELECTROCARDIOGRAM TRACING: CPT

## 2022-06-28 PROCEDURE — 99220 PR INITIAL OBSERVATION CARE/DAY 70 MINUTES: CPT | Performed by: INTERNAL MEDICINE

## 2022-06-28 PROCEDURE — 94760 N-INVAS EAR/PLS OXIMETRY 1: CPT

## 2022-06-28 PROCEDURE — 84484 ASSAY OF TROPONIN QUANT: CPT | Performed by: EMERGENCY MEDICINE

## 2022-06-28 PROCEDURE — 94640 AIRWAY INHALATION TREATMENT: CPT

## 2022-06-28 PROCEDURE — 82948 REAGENT STRIP/BLOOD GLUCOSE: CPT

## 2022-06-28 PROCEDURE — 96365 THER/PROPH/DIAG IV INF INIT: CPT

## 2022-06-28 PROCEDURE — 87081 CULTURE SCREEN ONLY: CPT | Performed by: INTERNAL MEDICINE

## 2022-06-28 RX ORDER — METOPROLOL SUCCINATE 50 MG/1
50 TABLET, EXTENDED RELEASE ORAL EVERY 12 HOURS
Status: DISCONTINUED | OUTPATIENT
Start: 2022-06-28 | End: 2022-06-29 | Stop reason: HOSPADM

## 2022-06-28 RX ORDER — CHLORHEXIDINE GLUCONATE 0.12 MG/ML
15 RINSE ORAL EVERY 12 HOURS SCHEDULED
Status: DISCONTINUED | OUTPATIENT
Start: 2022-06-28 | End: 2022-06-29 | Stop reason: HOSPADM

## 2022-06-28 RX ORDER — PREGABALIN 75 MG/1
75 CAPSULE ORAL DAILY
Status: DISCONTINUED | OUTPATIENT
Start: 2022-06-28 | End: 2022-06-29 | Stop reason: HOSPADM

## 2022-06-28 RX ORDER — ESCITALOPRAM OXALATE 20 MG/1
10 TABLET ORAL DAILY
Status: DISCONTINUED | OUTPATIENT
Start: 2022-06-28 | End: 2022-06-28

## 2022-06-28 RX ORDER — ALBUTEROL SULFATE 2.5 MG/3ML
2.5 SOLUTION RESPIRATORY (INHALATION) EVERY 4 HOURS PRN
Status: DISCONTINUED | OUTPATIENT
Start: 2022-06-28 | End: 2022-06-29 | Stop reason: HOSPADM

## 2022-06-28 RX ORDER — POTASSIUM CHLORIDE 20 MEQ/1
40 TABLET, EXTENDED RELEASE ORAL ONCE
Status: DISCONTINUED | OUTPATIENT
Start: 2022-06-28 | End: 2022-06-28

## 2022-06-28 RX ORDER — FAMOTIDINE 40 MG/5ML
20 POWDER, FOR SUSPENSION ORAL 2 TIMES DAILY
Status: DISCONTINUED | OUTPATIENT
Start: 2022-06-28 | End: 2022-06-29 | Stop reason: HOSPADM

## 2022-06-28 RX ORDER — ACETAMINOPHEN 325 MG/1
650 TABLET ORAL EVERY 6 HOURS PRN
Status: DISCONTINUED | OUTPATIENT
Start: 2022-06-28 | End: 2022-06-29 | Stop reason: HOSPADM

## 2022-06-28 RX ORDER — INSULIN LISPRO 100 [IU]/ML
2 INJECTION, SOLUTION INTRAVENOUS; SUBCUTANEOUS
Status: DISCONTINUED | OUTPATIENT
Start: 2022-06-28 | End: 2022-06-29

## 2022-06-28 RX ORDER — INSULIN LISPRO 100 [IU]/ML
1-6 INJECTION, SOLUTION INTRAVENOUS; SUBCUTANEOUS
Status: DISCONTINUED | OUTPATIENT
Start: 2022-06-29 | End: 2022-06-29 | Stop reason: HOSPADM

## 2022-06-28 RX ORDER — MAGNESIUM SULFATE HEPTAHYDRATE 40 MG/ML
2 INJECTION, SOLUTION INTRAVENOUS ONCE
Status: COMPLETED | OUTPATIENT
Start: 2022-06-28 | End: 2022-06-28

## 2022-06-28 RX ORDER — AMIODARONE HYDROCHLORIDE 200 MG/1
100 TABLET ORAL
Status: DISCONTINUED | OUTPATIENT
Start: 2022-06-28 | End: 2022-06-29 | Stop reason: HOSPADM

## 2022-06-28 RX ORDER — MAGNESIUM SULFATE HEPTAHYDRATE 40 MG/ML
2 INJECTION, SOLUTION INTRAVENOUS ONCE
Status: COMPLETED | OUTPATIENT
Start: 2022-06-28 | End: 2022-06-29

## 2022-06-28 RX ORDER — LEVALBUTEROL INHALATION SOLUTION 1.25 MG/3ML
1.25 SOLUTION RESPIRATORY (INHALATION)
Status: DISCONTINUED | OUTPATIENT
Start: 2022-06-28 | End: 2022-06-29 | Stop reason: HOSPADM

## 2022-06-28 RX ORDER — POTASSIUM CHLORIDE 20MEQ/15ML
40 LIQUID (ML) ORAL ONCE
Status: COMPLETED | OUTPATIENT
Start: 2022-06-28 | End: 2022-06-28

## 2022-06-28 RX ORDER — TRAZODONE HYDROCHLORIDE 50 MG/1
50 TABLET ORAL ONCE
Status: COMPLETED | OUTPATIENT
Start: 2022-06-28 | End: 2022-06-28

## 2022-06-28 RX ORDER — CALCIUM CARBONATE 200(500)MG
1000 TABLET,CHEWABLE ORAL DAILY PRN
Status: DISCONTINUED | OUTPATIENT
Start: 2022-06-28 | End: 2022-06-29 | Stop reason: HOSPADM

## 2022-06-28 RX ORDER — LORAZEPAM 0.5 MG/1
0.5 TABLET ORAL EVERY 8 HOURS PRN
Status: DISCONTINUED | OUTPATIENT
Start: 2022-06-28 | End: 2022-06-29 | Stop reason: HOSPADM

## 2022-06-28 RX ORDER — INSULIN LISPRO 100 [IU]/ML
1-6 INJECTION, SOLUTION INTRAVENOUS; SUBCUTANEOUS
Status: DISCONTINUED | OUTPATIENT
Start: 2022-06-28 | End: 2022-06-29 | Stop reason: HOSPADM

## 2022-06-28 RX ORDER — LOSARTAN POTASSIUM 50 MG/1
50 TABLET ORAL EVERY 12 HOURS
Status: DISCONTINUED | OUTPATIENT
Start: 2022-06-28 | End: 2022-06-29 | Stop reason: HOSPADM

## 2022-06-28 RX ORDER — SPIRONOLACTONE 100 MG/1
100 TABLET, FILM COATED ORAL DAILY
Status: DISCONTINUED | OUTPATIENT
Start: 2022-06-28 | End: 2022-06-29 | Stop reason: HOSPADM

## 2022-06-28 RX ORDER — FUROSEMIDE 40 MG/1
60 TABLET ORAL
Status: DISCONTINUED | OUTPATIENT
Start: 2022-06-28 | End: 2022-06-29 | Stop reason: HOSPADM

## 2022-06-28 RX ORDER — ATORVASTATIN CALCIUM 40 MG/1
40 TABLET, FILM COATED ORAL
Status: DISCONTINUED | OUTPATIENT
Start: 2022-06-28 | End: 2022-06-29 | Stop reason: HOSPADM

## 2022-06-28 RX ORDER — INSULIN GLARGINE 100 [IU]/ML
12 INJECTION, SOLUTION SUBCUTANEOUS EVERY MORNING
Status: DISCONTINUED | OUTPATIENT
Start: 2022-06-28 | End: 2022-06-29

## 2022-06-28 RX ORDER — GUAIFENESIN/DEXTROMETHORPHAN 100-10MG/5
10 SYRUP ORAL 3 TIMES DAILY
Status: DISCONTINUED | OUTPATIENT
Start: 2022-06-28 | End: 2022-06-29 | Stop reason: HOSPADM

## 2022-06-28 RX ADMIN — IPRATROPIUM BROMIDE 0.5 MG: 0.5 SOLUTION RESPIRATORY (INHALATION) at 14:35

## 2022-06-28 RX ADMIN — INSULIN LISPRO 2 UNITS: 100 INJECTION, SOLUTION INTRAVENOUS; SUBCUTANEOUS at 18:33

## 2022-06-28 RX ADMIN — PREGABALIN 75 MG: 75 CAPSULE ORAL at 10:29

## 2022-06-28 RX ADMIN — FUROSEMIDE 60 MG: 40 TABLET ORAL at 18:32

## 2022-06-28 RX ADMIN — SPIRONOLACTONE 100 MG: 25 TABLET ORAL at 10:22

## 2022-06-28 RX ADMIN — LOSARTAN POTASSIUM 50 MG: 50 TABLET, FILM COATED ORAL at 14:02

## 2022-06-28 RX ADMIN — ATORVASTATIN CALCIUM 40 MG: 40 TABLET, FILM COATED ORAL at 18:33

## 2022-06-28 RX ADMIN — LOSARTAN POTASSIUM 50 MG: 50 TABLET, FILM COATED ORAL at 02:51

## 2022-06-28 RX ADMIN — TICAGRELOR 90 MG: 90 TABLET ORAL at 10:23

## 2022-06-28 RX ADMIN — LEVALBUTEROL HYDROCHLORIDE 1.25 MG: 1.25 SOLUTION RESPIRATORY (INHALATION) at 08:15

## 2022-06-28 RX ADMIN — FAMOTIDINE 20 MG: 40 POWDER, FOR SUSPENSION ORAL at 11:35

## 2022-06-28 RX ADMIN — AMIODARONE HYDROCHLORIDE 100 MG: 200 TABLET ORAL at 10:28

## 2022-06-28 RX ADMIN — FUROSEMIDE 60 MG: 40 TABLET ORAL at 10:23

## 2022-06-28 RX ADMIN — LORAZEPAM 0.5 MG: 0.5 TABLET ORAL at 11:34

## 2022-06-28 RX ADMIN — INSULIN LISPRO 2 UNITS: 100 INJECTION, SOLUTION INTRAVENOUS; SUBCUTANEOUS at 10:31

## 2022-06-28 RX ADMIN — LORAZEPAM 0.5 MG: 0.5 TABLET ORAL at 21:32

## 2022-06-28 RX ADMIN — MAGNESIUM SULFATE HEPTAHYDRATE 2 G: 40 INJECTION, SOLUTION INTRAVENOUS at 00:10

## 2022-06-28 RX ADMIN — INSULIN GLARGINE 12 UNITS: 100 INJECTION, SOLUTION SUBCUTANEOUS at 11:36

## 2022-06-28 RX ADMIN — GUAIFENESIN AND DEXTROMETHORPHAN 10 ML: 100; 10 SYRUP ORAL at 18:32

## 2022-06-28 RX ADMIN — FAMOTIDINE 20 MG: 40 POWDER, FOR SUSPENSION ORAL at 18:44

## 2022-06-28 RX ADMIN — CHLORHEXIDINE GLUCONATE 0.12% ORAL RINSE 15 ML: 1.2 LIQUID ORAL at 21:32

## 2022-06-28 RX ADMIN — APIXABAN 5 MG: 5 TABLET, FILM COATED ORAL at 18:33

## 2022-06-28 RX ADMIN — POTASSIUM CHLORIDE 40 MEQ: 20 SOLUTION ORAL at 01:37

## 2022-06-28 RX ADMIN — MAGNESIUM SULFATE HEPTAHYDRATE 2 G: 40 INJECTION, SOLUTION INTRAVENOUS at 23:37

## 2022-06-28 RX ADMIN — APIXABAN 5 MG: 5 TABLET, FILM COATED ORAL at 10:22

## 2022-06-28 RX ADMIN — IPRATROPIUM BROMIDE 0.5 MG: 0.5 SOLUTION RESPIRATORY (INHALATION) at 20:08

## 2022-06-28 RX ADMIN — LEVALBUTEROL HYDROCHLORIDE 1.25 MG: 1.25 SOLUTION RESPIRATORY (INHALATION) at 14:35

## 2022-06-28 RX ADMIN — TRAZODONE HYDROCHLORIDE 50 MG: 50 TABLET ORAL at 23:43

## 2022-06-28 RX ADMIN — ACETAMINOPHEN 650 MG: 325 TABLET, FILM COATED ORAL at 21:32

## 2022-06-28 RX ADMIN — METOPROLOL SUCCINATE 50 MG: 50 TABLET, EXTENDED RELEASE ORAL at 02:51

## 2022-06-28 RX ADMIN — ALBUTEROL SULFATE 2.5 MG: 2.5 SOLUTION RESPIRATORY (INHALATION) at 02:56

## 2022-06-28 RX ADMIN — IPRATROPIUM BROMIDE 0.5 MG: 0.5 SOLUTION RESPIRATORY (INHALATION) at 08:15

## 2022-06-28 RX ADMIN — INSULIN LISPRO 6 UNITS: 100 INJECTION, SOLUTION INTRAVENOUS; SUBCUTANEOUS at 23:37

## 2022-06-28 RX ADMIN — LEVALBUTEROL HYDROCHLORIDE 1.25 MG: 1.25 SOLUTION RESPIRATORY (INHALATION) at 20:08

## 2022-06-28 RX ADMIN — INSULIN LISPRO 2 UNITS: 100 INJECTION, SOLUTION INTRAVENOUS; SUBCUTANEOUS at 13:51

## 2022-06-28 RX ADMIN — LORAZEPAM 0.5 MG: 0.5 TABLET ORAL at 04:09

## 2022-06-28 RX ADMIN — GUAIFENESIN AND DEXTROMETHORPHAN 10 ML: 100; 10 SYRUP ORAL at 10:35

## 2022-06-28 RX ADMIN — TICAGRELOR 90 MG: 90 TABLET ORAL at 18:33

## 2022-06-28 RX ADMIN — METOPROLOL SUCCINATE 50 MG: 50 TABLET, EXTENDED RELEASE ORAL at 14:03

## 2022-06-28 NOTE — ASSESSMENT & PLAN NOTE
Lab Results   Component Value Date    HGBA1C 12 7 (H) 05/22/2022       No results for input(s): POCGLU in the last 72 hours      Blood Sugar Average: Last 72 hrs:     · Continue home regimen:  · Lantus 12 units AM, 2 units Novolog with meals  · Start SSI AC/HS with Accu-checks  · Adjust insulin as needed  · Hypoglycemia protocol

## 2022-06-28 NOTE — ASSESSMENT & PLAN NOTE
Wt Readings from Last 3 Encounters:   06/28/22 83 9 kg (185 lb)   06/21/22 84 kg (185 lb 3 oz)   06/06/22 84 kg (185 lb 3 2 oz)     · BNP pending  · Appears euvolemic on exam  · ECHO 4/2022 - LVEF 50%, moderate hypokinesis of inferior and basal anterolateral walls, moderate concentric hypertrophy  · Follows with Cardiology outpatient  · Continue home regimen  · Daily weights, I&Os, fluid and sodium restriction

## 2022-06-28 NOTE — ASSESSMENT & PLAN NOTE
· Chronically on 10 L trach mask  · Currently at baseline  · Continue Atrovent, Xopenex, PRN Albuterol   · Respiratory protocol

## 2022-06-28 NOTE — NUTRITION
06/28/22 1210   Recommendations/Interventions   Summary Pt triggered for Poor PO  Pt has a tracheostomy in place and was unable to talk, this writer made use of closed ended questions  Appetite has been low-pt denies nausea, vomiting but admits to reduced PO d/t being tired, lethargy  Pt confirms wt loss (3 5% x 3 weeks)  Pt confirms food does get stuck in throat, has a hard time swallowing at times, no issues with chewing  Pt meets malnutrition criteria, see 360 statement  Will initiate ensure pudding BID to help pt wiht meeting estimated needs with consideration for fluid restriction  Will observe wt, appetite, PO intake, labs  Recommend speech evaluation d/t pt's note (and other provider as per chart review) of swallowing difficulty  Interventions/Recommendations Continue current diet order;Speech/swallow evaluation; Supplement initiate

## 2022-06-28 NOTE — ASSESSMENT & PLAN NOTE
· Patient complaining of constant chest tightness and mid-sternal, constant, sharp chest pain  · Likely due to anxiety and SOB however given extensive cardiac history, will do cardiac work-up  · Trend hs trops  · EKG with RSR" V1 and prolonged QTc  · Repeat in AM  · On Life Vest, monitor on tele  · Electrolytes repleted in ED  · Repeat in AM

## 2022-06-28 NOTE — PHYSICIAN ADVISOR
Current patient class: Observation  The patient is currently on Hospital Day: 2 at [facility]        The patient was admitted to the hospital  on N/A at N/A for the following diagnosis: Anxiety [F41 9]  Chest pain [R07 9]  SOB (shortness of breath) [R06 02]  Chest congestion [R09 89]     After review of the relevant documentation, labs, vital signs and test results, the patient is most appropriate for OBSERVATION CLASS  Rationale is as follows: The patient was placed under observation class this morning for monitoring  Today she complains of sore throat and some shortness of breath  There is concern for a viral etiology  Chest x-ray showed no acute findings  She has chronic trach dependent respiratory failure and is on her baseline oxygen requirements  It would be reasonable to continue observation today  If the patient is not medically ready for discharge tomorrow June 29th and I recommend change to an inpatient admission  The patients vitals on arrival were   ED Triage Vitals   Temperature Pulse Respirations Blood Pressure SpO2   06/27/22 2035 06/27/22 2035 06/27/22 2035 06/27/22 2035 06/27/22 2035   98 2 °F (36 8 °C) 86 (!) 26 (!) 171/116 99 %      Temp Source Heart Rate Source Patient Position - Orthostatic VS BP Location FiO2 (%)   06/27/22 2035 06/27/22 2035 06/27/22 2035 06/27/22 2035 06/28/22 0256   Oral Monitor Lying Right arm 30      Pain Score       06/27/22 2035       No Pain           Past Medical History:   Diagnosis Date    Anxiety     Aortic aneurysm (HCC)     Arthritis     Depression     Diabetes mellitus (HCC)     Fibromyalgia     GERD (gastroesophageal reflux disease)     GERD (gastroesophageal reflux disease)     H/O cardiovascular stress test 09/2018    no ischemia  EF 70%   H/O echocardiogram 01/2019    EF 60%  Mild LVH  trivial effusion      Hyperlipidemia     Hypertension     Migraines     Psychiatric disorder     anxiety    Uncontrolled hypertension 2015    Last Assessment & Plan:  BP today above goal <140/90, apparently asymptomatic  Prior BP elevations were attributed to not taking medication  Consider increased medication if persistent on f/u  Past Surgical History:   Procedure Laterality Date    BACK SURGERY      Lumbar epidural steroid injection    CARDIAC CATHETERIZATION N/A 10/22/2021    Procedure: Cardiac pci;  Surgeon: Rebeca Hand MD;  [facility]; Service: Cardiology    CARDIAC CATHETERIZATION  10/22/2021    Procedure: Cardiac catheterization;  Surgeon: Rebeca Hand MD;  [facility]; Service: Cardiology    CARDIAC CATHETERIZATION Left 10/27/2021    Procedure: Cardiac Left Heart Cath;  Surgeon: Micha Wu MD;  [facility]; Service: Cardiology    CARDIAC CATHETERIZATION N/A 10/27/2021    Procedure: Cardiac pci;  Surgeon: Micha Wu MD;  [facility]; Service: Cardiology    CARDIAC CATHETERIZATION N/A 10/28/2021    Procedure: Cardiac pci;  Surgeon: Shagufta Law DO;  [facility];   Service: Cardiology    CARPAL TUNNEL RELEASE Left      SECTION      CHOLECYSTECTOMY      COLONOSCOPY      incomplete    COLONOSCOPY      EYE SURGERY      HYSTERECTOMY      Total    OVARIAN CYST REMOVAL      PEG W/TRACHEOSTOMY PLACEMENT N/A 2021    Procedure: TRACHEOSTOMY WITH INSERTION PEG TUBE;  Surgeon: Bre Fung DO;  Location: [facility] MAIN OR;  Service: General    TUBAL LIGATION      UPPER GASTROINTESTINAL ENDOSCOPY             Consults have been placed to:   None    Vitals:    22 0600 22 0734 22 0735 22 0816   BP:  (!) 179/104 (!) 87/49    BP Location:  Right arm Left arm    Pulse:  77     Resp:  16     Temp:  (!) 96 2 °F (35 7 °C)     TempSrc:  Tympanic     SpO2:  99%  100%   Weight: 81 kg (178 lb 9 6 oz)      Height:           Most recent labs:    Recent Labs 06/27/22  2327   WBC 8 84   HGB 9 5*   HCT 30 9*   *   K 2 8*   CALCIUM 8 6   BUN 12   CREATININE 0 73   AST 7*   ALT 5*   ALKPHOS 86       Scheduled Meds:  Current Facility-Administered Medications   Medication Dose Route Frequency Provider Last Rate    albuterol  2 5 mg Nebulization Q4H PRN NEELAM Diaz      amiodarone  100 mg Oral Daily With Breakfast Susan Simon, [address]      apixaban  5 mg Oral BID [facility], [address]      atorvastatin  40 mg Oral Daily With [employer], [address]      calcium carbonate  1,000 mg Oral Daily PRN NEELAM Diaz      chlorhexidine  15 mL Mouth/Throat Q12H [address], [address]      dextromethorphan-guaiFENesin  10 mL Oral TID NEELAM Lopez      famotidine  20 mg Oral BID [facility], [address]      furosemide  60 mg Oral BID (diuretic) NEELAM Diaz      insulin glargine  12 Units Subcutaneous QAM [facility], [address]      insulin lispro  2 Units Subcutaneous TID With Meals NEELAM Diaz      ipratropium  0 5 mg Nebulization TID [facility], [address]      levalbuterol  1 25 mg Nebulization TID NEELAM Lopez      LORazepam  0 5 mg Oral Q8H PRN [facility], [address]      losartan  50 mg Oral [address] [employer], [address]      metoprolol succinate  50 mg Oral [address] [employer], [address]      pregabalin  75 mg Oral Daily [facility], [address]      spironolactone  100 mg Oral Daily [facility], [address]      ticagrelor  90 mg Oral Q12H Halley Claudeen Cooper, CRNP       Continuous Infusions:   PRN Meds:   albuterol    calcium carbonate    LORazepam

## 2022-06-28 NOTE — RESPIRATORY THERAPY NOTE
RT Protocol Note  Belén Ortiz 65 y o  female MRN: [id number]  Unit/Bed#: -01 Encounter: [id number]    Assessment    Principal Problem:    Shortness of breath  Active Problems:    Anxiety    Hypertension    Type 2 diabetes mellitus treated with insulin (HCC)    Hypokalemia    History of Ventricular tachycardia (HCC)    Tracheostomy in place [facility])    Chronic hypoxemic respiratory failure (HCC)    Chronic combined systolic (congestive) and diastolic (congestive) heart failure (HCC)    Prolonged Q-T interval on ECG    Hypomagnesemia    Chest pain      Home Pulmonary Medications:  Xopenex/Atrovent TID  Albuteral Neb Q4PRN   06/28/22 0256   Respiratory Protocol   Protocol Initiated? Yes   Protocol Selection Respiratory   Language Barrier? No   Medical & Social History Reviewed? Yes   Diagnostic Studies Reviewed? Yes   Physical Assessment Performed? Yes   Home Devices/Therapy Home O2  (Trach Collar)   Respiratory Plan Home Bronchodilator Patient pathway   Respiratory Assessment   Assessment Type Pre-treatment   General Appearance Awake; Alert   Respiratory Pattern Normal   Chest Assessment Chest expansion symmetrical   Bilateral Breath Sounds Diminished;Coarse;Crackles   Cough Non-productive   Resp Comments PRN Abuteral given as per pt request Pt assessed for Respiratory Protocol at this time  BS diminshed,coarse, with crackels at bases  SPO2 100% on pt's home O2 therapy of 35% Trach Collar  Continue with pt home Respiratory Therapy as ordered  O2 Device 35%TC   Additional Assessments   Pulse 74   Respirations 18   SpO2 100 %     Home Devices/Therapy: (P) Home O2 (Trach Collar)    Past Medical History:   Diagnosis Date    Anxiety     Aortic aneurysm (HCC)     Arthritis     Depression     Diabetes mellitus (HCC)     Fibromyalgia     GERD (gastroesophageal reflux disease)     GERD (gastroesophageal reflux disease)     H/O cardiovascular stress test 09/2018    no ischemia  EF 70%      H/O echocardiogram 01/2019 EF 60%  Mild LVH  trivial effusion  Hyperlipidemia     Hypertension     Migraines     Psychiatric disorder     anxiety    Uncontrolled hypertension 2015    Last Assessment & Plan:  BP today above goal <140/90, apparently asymptomatic  Prior BP elevations were attributed to not taking medication  Consider increased medication if persistent on f/u  Social History     Socioeconomic History    Marital status: Legally      Spouse name: None    Number of children: None    Years of education: None    Highest education level: None   Occupational History    None   Tobacco Use    Smoking status: Former Smoker     Packs/day: 1 00     Years: 30 00     Pack years: 30 00     Types: Cigarettes    Smokeless tobacco: Never Used   Vaping Use    Vaping Use: Never used   Substance and Sexual Activity    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       Drug use: Yes     Types: Marijuana     Comment: medical; seldom use    Sexual activity: Yes     Birth control/protection: Female Sterilization     Comment: Monogamous relationship with monogamous partner   Other Topics Concern    None   Social History Narrative    Most recent tobacco use screenin2019    Do you currently or have you served in the [employer]: No    Were you activated, into active duty, as a member of the [employer] or as a Reservist: No    Advance directive: No    Chewing tobacco: none    Alcohol intake: None    Marital status: Single    Live alone or with others: with others    Family history of heart disease:    aortic aneurysm    High cholesterol: Yes    High blood pressure: Yes    Exercise level: Heavy    Overweight: Yes    Obese: Yes    Diabetes: Yes    General stress level: High    Diet: Regular    Caffeine intake: Occasional    Guns present in home: No    Seat belts used routinely: Yes    Sexual orientation: Heterosexual    Sunscreen used routinely: No    Seat belt/car seat used routinely: Yes    Exercise-How often do you exercise: as tolerated    Do you have regular medical check ups: Yes    Do you have regular dental check ups: Yes    Illicit drugs-years of use:    recovery 23 years - HX of     Social Determinants of Health     Financial Resource Strain: Not on file   Food Insecurity: Food Insecurity Present    Worried About [employer] in the Last Year: Sometimes true    Ran Out of Food in the Last Year: Sometimes true   Transportation Needs: No Transportation Needs    Lack of Transportation (Medical): No    Lack of Transportation (Non-Medical): No   Physical Activity: Not on file   Stress: Not on file   Social Connections: Not on file   Intimate Partner Violence: Not on file   Housing Stability: Low Risk     Unable to Pay for Housing in the Last Year: No    Number of Places Lived in the Last Year: 1    Unstable Housing in the Last Year: No       Subjective         Objective    Physical Exam:   Assessment Type: (P) Pre-treatment  General Appearance: (P) Awake, Alert  Respiratory Pattern: (P) Normal  Chest Assessment: (P) Chest expansion symmetrical  Bilateral Breath Sounds: (P) Diminished, Coarse, Crackles  Cough: (P) Non-productive  O2 Device: (P) 35%TC    Vitals:  Blood pressure 160/96, pulse (P) 74, temperature 98 °F (36 7 °C), temperature source Oral, resp  rate (P) 18, height 5' 9" (1 753 m), weight 83 4 kg (183 lb 13 8 oz), SpO2 100 %  Imaging and other studies: I have personally reviewed pertinent reports  O2 Device: (P) 35%TC     Plan    Respiratory Plan: (P) Home Bronchodilator Patient pathway        Resp Comments: (P) PRN Abuteral given as per pt request Pt assessed for Respiratory Protocol at this time  BS diminshed,coarse, with crackels at bases  SPO2 100% on pt's home O2 therapy of 35% Trach Collar  Continue with pt home Respiratory Therapy as ordered

## 2022-06-28 NOTE — RESPIRATORY THERAPY NOTE
Pt suctioned X 2 with very little secretions  Trach care performed, inner cannula cleaned, as well as outside of pt trach/stoma area  Pt with redness above trach and below flang of trach,gauze applied for comfort and to try to reduce the rubbing of trach  RN made aware

## 2022-06-28 NOTE — ASSESSMENT & PLAN NOTE
· K 2 8 in ED  · Mag noted to be 1 3  · Received Mag replacement and 40 mEq PO K in ED  · Repeat BMP in AM  · Replete as needed  · Monitor on tele

## 2022-06-28 NOTE — UTILIZATION REVIEW
Initial Clinical Review    Admission: Date/Time/Statement:   Admission Orders (From admission, onward)     Ordered        06/28/22 0111  Place in Observation  Once                      Orders Placed This Encounter   Procedures    Place in Observation     Standing Status:   Standing     Number of Occurrences:   1     Order Specific Question:   Level of Care     Answer:   Med Surg [16]     ED Arrival Information     Expected   -    Arrival   6/27/2022 20:27    Acuity   Urgent            Means of arrival   Wheelchair    Escorted by   Friend    Service   Hospitalist    Admission type   Urgent            Arrival complaint   Chest Pain           Chief Complaint   Patient presents with    Shortness of Breath     Pt presents with inc sob and pain around her trach site  Pt states her throat is sore  Initial Presentation: 65 y o  female   To ER From home,   Admitted OBS  Status,   MS level of care for  Workup of SOB, r/o  ACS  Presented to ER after not being able to suction her trach  (Chronically on 10 L trach mask)  with increasing SOB and CONNOLLY at home  Patient reports she has been coughing more than normal and has not been able to bring anything up  She reports increased anxiety, headache, SOB, CONNOLLY, cough, chest tightness,  after a prolonged coughing fit  In the ED, RT suctioned patient with minimal secretions and performed trach care  She was found to have a Mag of 1 3, K of 2 8, and EKG with RSR" V1 and prolonged QTc  (561)  Electrolytes repleted  Given her significant cardiac history and abnormal EKG,  Admitted for  monitoring of electrolyte/ resp status and  continued telemetry,    Htn improved w/PO ativan         Date:  6/28       Day 2:   Patient still c/0  Shortness of breath, sore throat-likely viral etiology, no acute findings on the imaging studies  Currently patient is afebrile, no leukocytosis  Continue to monitor off antibiotics      Also states there is increased sputum production than baseline  Chronic trach dependent respiratory failure, currently at baseline oxygen requirements  Chest pain likely from anxiety, monitor cardiac enzymes  Monitor on telemetry  Prolonged QT interval currently Lexapro on hold  Volume management  Chronic combined systolic and diastolic heart failure - examines euvolemic  H/0  VT and currently on LifeVest     Diabetes mellitus type 2 with insulin dependency monitor Accu-Cheks         ED Triage Vitals   Temperature Pulse Respirations Blood Pressure SpO2   06/27/22 2035 06/27/22 2035 06/27/22 2035 06/27/22 2035 06/27/22 2035   98 2 °F (36 8 °C) 86 (!) 26 (!) 171/116 99 %      Temp Source Heart Rate Source Patient Position - Orthostatic VS BP Location FiO2 (%)   06/27/22 2035 06/27/22 2035 06/27/22 2035 06/27/22 2035 06/28/22 0256   Oral Monitor Lying Right arm 30      Pain Score       06/27/22 2035       No Pain          Wt Readings from Last 1 Encounters:   06/28/22 81 kg (178 lb 9 6 oz)     Additional Vital Signs:   06/28/22 1124 96 1 °F (35 6 °C) Abnormal  76 16 156/106 Abnormal  -- 100 % -- -- -- Lying   06/28/22 0816 -- -- -- -- -- 100 % 30 -- Trach mask --   06/28/22 0735 -- -- -- 87/49 Abnormal  -- -- -- -- -- Lying   06/28/22 0734 96 2 °F (35 7 °C) Abnormal  77 16 179/104 Abnormal  -- 99 % -- -- -- Lying   06/28/22 0256 -- 74 18 -- -- 100 % 30 10 L/min Trach mask --   06/28/22 0219 98 °F (36 7 °C) 70 18 160/96 125 100 % -- -- Trach mask Sitting   06/28/22 0138 -- 74 20 168/94 -- 97 % -- 10 L/min Trach mask Sitting   06/28/22 0108 -- -- -- 168/66 -- -- -- -- -- Lying   06/28/22 0052 -- 74 20 178/106 Abnormal  -- 100 % -- -- Trach mask Lying   06/27/22 2304 -- 76 22 155/90 -- 100 % -- -- None (Room air) Sitting   06/27/22 2053 -- -- -- -- -- -- -- -- Trach mask --         Pertinent Labs/Diagnostic Test Results:   · ECHO 4/2022 - LVEF 50%, moderate hypokinesis of inferior and basal anterolateral walls, moderate concentric hypertrophy      XR chest 1 view portable    (Results Pending)         Results from last 7 days   Lab Units 06/27/22  2327 06/23/22  1540   WBC Thousand/uL 8 84 7 78   HEMOGLOBIN g/dL 9 5* 9 7*   HEMATOCRIT % 30 9* 32 6*   PLATELETS Thousands/uL 453* 408*   NEUTROS ABS Thousands/µL 7 01 6 14         Results from last 7 days   Lab Units 06/27/22  2327 06/23/22  1540   SODIUM mmol/L 138 138   POTASSIUM mmol/L 2 8* 3 3*   CHLORIDE mmol/L 100 101   CO2 mmol/L 29 27   ANION GAP mmol/L 9 10   BUN mg/dL 12 12   CREATININE mg/dL 0 73 0 67   EGFR ml/min/1 73sq m 92 98   CALCIUM mg/dL 8 6 8 7   MAGNESIUM mg/dL 1 3*  --    PHOSPHORUS mg/dL 3 1  --      Results from last 7 days   Lab Units 06/27/22  2327 06/23/22  1540   AST U/L 7* 8*   ALT U/L 5* 7   ALK PHOS U/L 86 90   TOTAL PROTEIN g/dL 7 1 7 3   ALBUMIN g/dL 3 3* 3 4*   TOTAL BILIRUBIN mg/dL 0 69 0 67     Results from last 7 days   Lab Units 06/23/22  1909 06/23/22  1834 06/23/22  1732   POC GLUCOSE mg/dl 317* 323* 334*     Results from last 7 days   Lab Units 06/27/22  2327 06/23/22  1540   GLUCOSE RANDOM mg/dL 295* 284*     Results from last 7 days   Lab Units 06/28/22  0406 06/28/22  0127 06/27/22  2327   HS TNI 0HR ng/L  --   --  24   HS TNI 2HR ng/L  --  25  --    HSTNI D2 ng/L  --  1  --    HS TNI 4HR ng/L 24  --   --    HSTNI D4 ng/L 0  --   --      Results from last 7 days   Lab Units 06/27/22 2327   BNP pg/mL 440*     ED Treatment:   Medication Administration from 06/27/2022 2027 to 06/28/2022 0203       Date/Time Order Dose Route Action     06/27/2022 2126 acetaminophen (TYLENOL) tablet 650 mg 650 mg Oral Given     06/27/2022 2126 LORazepam (ATIVAN) tablet 1 mg 1 mg Oral Given     06/28/2022 0010 magnesium sulfate 2 g/50 mL IVPB (premix) 2 g 2 g Intravenous New Bag     06/28/2022 0137 potassium chloride oral solution 40 mEq 40 mEq Oral Given        Past Medical History:   Diagnosis Date    Anxiety     Aortic aneurysm ([facility] [address] )     Arthritis     Depression     Diabetes mellitus ([facility] )  Fibromyalgia     GERD (gastroesophageal reflux disease)     GERD (gastroesophageal reflux disease)     H/O cardiovascular stress test 09/2018    no ischemia  EF 70%   H/O echocardiogram 01/2019    EF 60%  Mild LVH  trivial effusion   Hyperlipidemia     Hypertension     Migraines     Psychiatric disorder     anxiety    Uncontrolled hypertension 2/25/2015    Last Assessment & Plan:  BP today above goal <140/90, apparently asymptomatic  Prior BP elevations were attributed to not taking medication  Consider increased medication if persistent on f/u  Present on Admission:   Chronic combined systolic (congestive) and diastolic (congestive) heart failure (HCC)   Hypokalemia   Chronic hypoxemic respiratory failure (HCC)   Shortness of breath   Prolonged Q-T interval on ECG   Anxiety   Hypertension   Hypomagnesemia   Chest pain   History of Ventricular tachycardia (HCC)      Admitting Diagnosis: Anxiety [F41 9]  Chest pain [R07 9]  SOB (shortness of breath) [R06 02]  Chest congestion [R09 89]  Age/Sex: 65 y o  female     Admission Orders:    See above;  Supplemental oxygen prn (Currently at baseline oxygen)   Continue home breathing treatments with PRN Ativan;  Repeat ekg in AM   HOLD Lexapro  BMP in am   Daily weights, I&Os, fluid and sodium restriction     Maintain LifeVest        Scheduled Medications:  amiodarone, 100 mg, Oral, Daily With Breakfast  apixaban, 5 mg, Oral, BID  atorvastatin, 40 mg, Oral, Daily With Dinner  chlorhexidine, 15 mL, Mouth/Throat, Q12H [address]  dextromethorphan-guaiFENesin, 10 mL, Oral, TID  famotidine, 20 mg, Oral, BID  furosemide, 60 mg, Oral, BID (diuretic)  insulin glargine, 12 Units, Subcutaneous, QAM  insulin lispro, 2 Units, Subcutaneous, TID With Meals  ipratropium, 0 5 mg, Nebulization, TID  levalbuterol, 1 25 mg, Nebulization, TID  losartan, 50 mg, Oral, Q12H  metoprolol succinate, 50 mg, Oral, Q12H  pregabalin, 75 mg, Oral, Daily  spironolactone, 100 mg, Oral, Daily  ticagrelor, 90 mg, Oral, Q12H      Continuous IV Infusions:     PRN Meds:  albuterol, 2 5 mg, Nebulization, Q4H PRN  calcium carbonate, 1,000 mg, Oral, Daily PRN  LORazepam, 0 5 mg, Oral, Q8H PRN        Network Utilization Review Department  ATTENTION: Please call with any questions or concerns to [phone] and carefully listen to the prompts so that you are directed to the right person  All voicemails are confidential   Vy Bentley all requests for admission clinical reviews, approved or denied determinations and any other requests to dedicated fax number below belonging to the campus where the patient is receiving treatment   List of dedicated fax numbers for the Facilities:  [address] DENIALS (Administrative/Medical Necessity) [phone]   [address] (Maternity/NICU/Pediatrics) [phone]   [address] [place] [address]  [address] [facility] [place] [address] [address] [place] [address] Samara Phuong Velázquez [address] [facility] [facility] [phone]

## 2022-06-28 NOTE — ASSESSMENT & PLAN NOTE
· Presented to ED with SOB and feeling like she needed to be suctioned from her trach  · Minimal output after RT suctioned in ED  · Likely due to anxiety  · BNP pending  · No sign of infection - afebrile, no leukocytosis  · Currently at baseline oxygen  · Continue home breathing treatments with PRN Ativan  · Start Robitussin DM  · Respiratory protocol

## 2022-06-28 NOTE — ASSESSMENT & PLAN NOTE
· QTc 561  · HOLD Lexapro  · Repeat EKG in AM  · Replete electrolytes   · On Life Vest, monitor on tele

## 2022-06-28 NOTE — H&P
[address] 1965, 65 y o  female MRN: [id number]  Unit/Bed#: -01 Encounter: [id number]  Primary Care Provider: Marco العلي MD   Date and time admitted to hospital: 6/27/2022  8:30 PM    * Shortness of breath  Assessment & Plan  · Presented to ED with SOB and feeling like she needed to be suctioned from her trach  · Minimal output after RT suctioned in ED  · Likely due to anxiety  · BNP pending  · No sign of infection - afebrile, no leukocytosis  · Currently at baseline oxygen  · Continue home breathing treatments with PRN Ativan  · Start Robitussin DM  · Respiratory protocol      Chest pain  Assessment & Plan  · Patient complaining of constant chest tightness and mid-sternal, constant, sharp chest pain  · Likely due to anxiety and SOB however given extensive cardiac history, will do cardiac work-up  · Trend hs trops  · EKG with RSR" V1 and prolonged QTc  · Repeat in AM  · On Life Vest, monitor on tele  · Electrolytes repleted in ED  · Repeat in AM    Prolonged Q-T interval on ECG  Assessment & Plan  · QTc 561  · HOLD Lexapro  · Repeat EKG in AM  · Replete electrolytes   · On Life Vest, monitor on tele    Hypomagnesemia  Assessment & Plan  · Mag 1 3 in ED  · Given 2 g IV in ED  · Repeat Mag in AM  · Replete as needed     Hypokalemia  Assessment & Plan  · K 2 8 in ED  · Mag noted to be 1 3  · Received Mag replacement and 40 mEq PO K in ED  · Repeat BMP in AM  · Replete as needed  · Monitor on tele     Chronic hypoxemic respiratory failure (HCC)  Assessment & Plan  · Chronically on 10 L trach mask  · Currently at baseline  · Continue Atrovent, Xopenex, PRN Albuterol   · Respiratory protocol     Chronic combined systolic (congestive) and diastolic (congestive) heart failure (HCC)  Assessment & Plan  Wt Readings from Last 3 Encounters:   06/28/22 83 9 kg (185 lb)   06/21/22 84 kg (185 lb 3 oz)   06/06/22 84 kg (185 lb 3 2 oz)     · BNP pending  · Appears euvolemic on exam  · ECHO 4/2022 - LVEF 50%, moderate hypokinesis of inferior and basal anterolateral walls, moderate concentric hypertrophy  · Follows with Cardiology outpatient  · Continue home regimen  · Daily weights, I&Os, fluid and sodium restriction     Tracheostomy in place [facility])  Assessment & Plan  · Continue trach care    History of Ventricular tachycardia (HCC)  Assessment & Plan  · Hx of PAF  · On Life Vest  · Monitor on tele  · Continue home regimen     Type 2 diabetes mellitus treated with insulin ([address] )  Assessment & Plan  Lab Results   Component Value Date    HGBA1C 12 7 (H) 05/22/2022       No results for input(s): POCGLU in the last 72 hours  Blood Sugar Average: Last 72 hrs:     · Continue home regimen:  · Lantus 12 units AM, 2 units Novolog with meals  · Start SSI AC/HS with Accu-checks  · Adjust insulin as needed  · Hypoglycemia protocol     Hypertension  Assessment & Plan  · BP elevated in ED  · Improved with 1 mg PO Ativan  · Continue home regimen    Anxiety  Assessment & Plan  · Patient complaining of anxiety  · Received 1 mg PO Ativan in ED with improvement  · Continue home PRN Ativan  · HOLD home Lexapro in setting of prolonged QTc    VTE Pharmacologic Prophylaxis: VTE Score: 4 Moderate Risk (Score 3-4) - Pharmacological DVT Prophylaxis Ordered: apixaban (Eliquis)  Code Status: Level 1 - Full Code   Discussion with family: Patient declined call to   Anticipated Length of Stay: Patient will be admitted on an observation basis with an anticipated length of stay of less than 2 midnights secondary to EKG abnormalities requiring close monitoring  Total Time for Visit, including Counseling / Coordination of Care: 60 minutes Greater than 50% of this total time spent on direct patient counseling and coordination of care      Chief Complaint: Shortness of breath    History of Present Illness:  Sharad Washington is a 65 y o  female with a PMH of T2DM, HTN, combined CHF, PAF, V Tach on Life Vest, chronic hypoxic respiratory failure with tracheostomy, hx of cardiac arrest and STEMI who presents with shortness of breath  Patient presented to the ED after not being able to suction her trach with increasing SOB and CONNOLLY  Patient reports she has been coughing more than normal and has not been able to bring anything up  She reports increased anxiety, headache, SOB, CONNOLLY, cough, chest tightness, chest pain, and abdominal pain after a prolonged coughing fit  She reports that her chest pain is mid sternal, constant, and sharp  She denies any radiation of the pain, diaphoresis, N/V  Patient also denies any fever, chills, dizziness, lightheadedness, recent illness, sick contacts, or urinary complaints  In the ED, RT suctioned patient with minimal secretions and performed trach care  The patient was noted to have erythema at trach site  She was found to have a Mag of 1 3, K of 2 8, and EKG changes  She was given replacements for her electrolytes  Given her significant cardiac history and abnormal EKG, she was admitted for her complaint of chest pain and electrolyte abnormalities  Review of Systems:  Review of Systems   Constitutional: Negative for activity change, appetite change, chills, diaphoresis, fatigue and fever  HENT: Negative for congestion, postnasal drip, sinus pain, sore throat and trouble swallowing  Eyes: Negative for visual disturbance  Respiratory: Positive for cough, chest tightness and shortness of breath (SOB and CONNOLLY)  Negative for wheezing and stridor  Cardiovascular: Positive for chest pain and leg swelling  Negative for palpitations  Gastrointestinal: Negative for abdominal distention, abdominal pain, constipation, diarrhea, nausea and vomiting  Genitourinary: Negative for difficulty urinating, dysuria, flank pain and hematuria  Musculoskeletal: Negative for arthralgias, gait problem and myalgias  Skin: Negative for pallor, rash and wound  Neurological: Positive for headaches  Negative for dizziness, syncope, weakness, light-headedness and numbness  Psychiatric/Behavioral: Negative for confusion  The patient is not nervous/anxious  Past Medical and Surgical History:   Past Medical History:   Diagnosis Date    Anxiety     Aortic aneurysm ([address] )     Arthritis     Depression     Diabetes mellitus (HCC)     Fibromyalgia     GERD (gastroesophageal reflux disease)     GERD (gastroesophageal reflux disease)     H/O cardiovascular stress test 2018    no ischemia  EF 70%   H/O echocardiogram 2019    EF 60%  Mild LVH  trivial effusion   Hyperlipidemia     Hypertension     Migraines     Psychiatric disorder     anxiety    Uncontrolled hypertension 2015    Last Assessment & Plan:  BP today above goal <140/90, apparently asymptomatic  Prior BP elevations were attributed to not taking medication  Consider increased medication if persistent on f/u  Past Surgical History:   Procedure Laterality Date    BACK SURGERY      Lumbar epidural steroid injection    CARDIAC CATHETERIZATION N/A 10/22/2021    Procedure: Cardiac pci;  Surgeon: Joanna Carvajal MD;  [facility]; Service: Cardiology    CARDIAC CATHETERIZATION  10/22/2021    Procedure: Cardiac catheterization;  Surgeon: Joanna Carvajal MD;  [facility]; Service: Cardiology    CARDIAC CATHETERIZATION Left 10/27/2021    Procedure: Cardiac Left Heart Cath;  Surgeon: Pito Toscano MD;  [facility]; Service: Cardiology    CARDIAC CATHETERIZATION N/A 10/27/2021    Procedure: Cardiac pci;  Surgeon: Pito Toscano MD;  [facility]; Service: Cardiology    CARDIAC CATHETERIZATION N/A 10/28/2021    Procedure: Cardiac pci;  Surgeon: Yifan Augustine DO;  [facility];   Service: Cardiology    CARPAL TUNNEL RELEASE Left      SECTION      CHOLECYSTECTOMY      COLONOSCOPY      incomplete    COLONOSCOPY      EYE SURGERY      HYSTERECTOMY      Total    OVARIAN CYST REMOVAL      PEG W/TRACHEOSTOMY PLACEMENT N/A 11/12/2021    Procedure: TRACHEOSTOMY WITH INSERTION PEG TUBE;  Surgeon: Baldemar Fenton To, DO;  Location: [facility];  Service: General    TUBAL LIGATION      UPPER GASTROINTESTINAL ENDOSCOPY         Meds/Allergies:  Prior to Admission medications    Medication Sig Start Date End Date Taking?  Authorizing Provider   acetaminophen (TYLENOL) 160 mg/5 mL suspension 30 4 mL (975 mg total) by Per G Tube route every 6 (six) hours as needed for mild pain or fever 11/23/21   Bella Puentes PA-C   albuterol (2 5 mg/3 mL) 0 083 % nebulizer solution Take 3 mL (2 5 mg total) by nebulization every 4 (four) hours as needed for wheezing or shortness of breath 4/7/22   Praveena Guillen MD   amiodarone 100 mg tablet TAKE 1 TABLET BY MOUTH DAILY WITH BREAKFAST  6/20/22   Praveena Guillen MD   apixaban (ELIQUIS) 5 mg Take 1 tablet (5 mg total) by mouth 2 (two) times a day 3/21/22   Praveena Guillen MD   atorvastatin (LIPITOR) 40 mg tablet Take 1 tablet (40 mg total) by mouth daily 3/21/22   Praveena Guillen MD   Benzocaine-Menthol 15-2 6 MG LOZG Apply 1 lozenge to the mouth or throat 4 (four) times a day as needed (sore throat)  Patient not taking: No sig reported 5/14/22   Miranda Colon MD   Blood Pressure Monitoring (Sphygmomanometer) MISC Use 2 (two) times a day  Patient not taking: No sig reported 4/30/21   Rhoda Bates MD   Brilinta 90 MG TAKE 1 TABLET BY MOUTH EVERY 12 HOURS  6/20/22   Praveena Guillen MD   bumetanide (BUMEX) 2 mg tablet Take 1 tablet (2 mg total) by mouth 2 (two) times a day 6/8/22   NEELAM Anderson   chlorhexidine (PERIDEX) 0 12 % solution Apply 15 mL to the mouth or throat every 12 (twelve) hours 11/23/21   Bella Puentes PA-C   escitalopram (LEXAPRO) 10 mg tablet TAKE 1 TABLET BY MOUTH EVERY DAY 5/16/22   Praveena Guillen MD   famotidine (PEPCID) 20 mg/2 5 mL oral suspension Take 2 5 mL (20 mg total) by mouth 2 (two) times a day 11/23/21   Bella Puentes PA-C   insulin aspart (NovoLOG FlexPen) 100 UNIT/ML injection pen Inject 2 Units under the skin 3 (three) times a day with meals 3/23/22   Clara Bailey MD   insulin glargine (Basaglar KwikPen) 100 units/mL injection pen Inject 12 Units under the skin in the morning  5/23/22   Ankita Garg MD   Insulin Pen Needle (Novofine Pen Needle) 32G X 6 MM MISC Use 3 (three) times a day with meals 3/23/22   lCara Bailey MD   Insulin Pen Needle 31G X 6 MM MISC 1 Device by Does not apply route 4 (four) times a day 6/5/17   Historical Provider, MD   ipratropium (ATROVENT) 0 02 % nebulizer solution Take 2 5 mL (0 5 mg total) by nebulization 3 (three) times a day 11/23/21   Alec Ramos PA-C   Lancets MISC Use 1 Device 4 (four) times a day    Historical Provider, MD   levalbuterol (XOPENEX) 1 25 mg/0 5 mL nebulizer solution Take 0 5 mL (1 25 mg total) by nebulization 3 (three) times a day 11/23/21   Bella Puentes PA-C   LORazepam (Ativan) 1 mg tablet Take 0 5 tablets (0 5 mg total) by mouth every 8 (eight) hours as needed for anxiety or sleep 6/20/22   Clara Bailey MD   losartan (COZAAR) 50 mg tablet Take 1 tablet (50 mg total) by mouth every 12 (twelve) hours 6/6/22   NEELAM Sauceda   magnesium Oxide (MAG-OX) 400 mg TABS TAKE 1 TABLET (400 MG TOTAL) BY MOUTH 2 TIMES A DAY 6/8/22   Historical Provider, MD   Magnesium Oxide 400 MG CAPS Take 1 tablet (400 mg total) by mouth 2 (two) times a day 6/8/22   NEELAM Sauceda   melatonin 3 mg Take 2 tablets (6 mg total) by mouth daily at bedtime  Patient not taking: No sig reported 3/15/22   Clara Bailey MD   metoprolol succinate (TOPROL-XL) 50 mg 24 hr tablet Take 1 tablet (50 mg total) by mouth every 12 (twelve) hours 6/6/22   NEELAM Sauceda   pantoprazole (PROTONIX) 40 mg tablet Take 1 tablet (40 mg total) by mouth daily 6/21/22   Hemanth Heard MD   polyethylene glycol [facility] 17 g packet Take 17 g by mouth daily  Patient not taking: No sig reported 11/23/21   Bella Puentes PA-C   potassium chloride (K-DUR,KLOR-CON) 20 mEq tablet Take 2 tablets (40 mEq total) by mouth 2 (two) times a day 6/8/22   NEELAM Garcia   pregabalin (LYRICA) 75 mg capsule TAKE ONE CAPSULE EVERY DAY 1/29/21   Dilan Atkinson MD   senna-docusate sodium (SENOKOT S) 8 6-50 mg per tablet Take 1 tablet by mouth daily at bedtime  Patient not taking: No sig reported 11/23/21   Bella Puentes PA-C   spironolactone (ALDACTONE) 100 mg tablet TAKE 1 TABLET BY MOUTH EVERY DAY 6/20/22   Kori Beck MD   traZODone (DESYREL) 50 mg tablet TAKE 0 5 TABLET (25MG) BY MOUTH NIGHTLY AS NEEDED FOR SLEEP  Patient not taking: No sig reported 2/10/22   Historical Provider, MD     I have reviewed home medications with patient personally  Allergies: Allergies   Allergen Reactions    Metformin Diarrhea    Clonidine Rash     Patch        Social History:  Marital Status: Legally    Occupation:   Patient Pre-hospital Living Situation: Home  Patient Pre-hospital Level of Mobility: walks with cane  Patient Pre-hospital Diet Restrictions: low Na, 1 8 L FR  Substance Use History:   Social History     Substance and Sexual Activity   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        Social History     Tobacco Use   Smoking Status Former Smoker    Packs/day: 1 00    Years: 30 00    Pack years: 30 00    Types: Cigarettes   Smokeless Tobacco Never Used     Social History     Substance and Sexual Activity   Drug Use Yes    Types: Marijuana    Comment: medical; seldom use       Family History:  Family History   Problem Relation Age of Onset    Hypertension Mother     Arthritis Mother     Diabetes Father     Other Sister         renal cell carcinoma    Diabetes Other     Factor V Leiden deficiency Other        Physical Exam:     Vitals:   Blood Pressure: 160/96 (06/28/22 0219)  Pulse: 70 (06/28/22 0132)  Temperature: 98 °F (36 7 °C) (22)  Temp Source: Oral (22)  Respirations: 18 (22)  Height: 5' 9" (175 3 cm) (22)  Weight - Scale: 83 4 kg (183 lb 13 8 oz) (22)  SpO2: 100 % (22)    Physical Exam  Vitals reviewed  Constitutional:       General: She is not in acute distress  Appearance: She is ill-appearing (chronically)  She is not diaphoretic  HENT:      Head: Normocephalic  Comments: Trach present, erythema noted around site     Nose: Nose normal       Mouth/Throat:      Mouth: Mucous membranes are moist       Pharynx: Oropharynx is clear  Eyes:      Extraocular Movements: Extraocular movements intact  Conjunctiva/sclera: Conjunctivae normal       Pupils: Pupils are equal, round, and reactive to light  Cardiovascular:      Rate and Rhythm: Normal rate and regular rhythm  Pulses: Normal pulses  Heart sounds: Normal heart sounds  Pulmonary:      Effort: Pulmonary effort is normal  Tachypnea present  No accessory muscle usage or respiratory distress  Breath sounds: Examination of the right-lower field reveals decreased breath sounds  Examination of the left-lower field reveals decreased breath sounds  Decreased breath sounds present  No wheezing, rhonchi or rales  Comments: Trach collar present  Abdominal:      General: Bowel sounds are normal  There is no distension  Palpations: Abdomen is soft  Tenderness: There is no abdominal tenderness  There is no guarding or rebound  Musculoskeletal:         General: No tenderness  Normal range of motion  Cervical back: Normal range of motion  Right lower le+ Edema present  Left lower le+ Edema present  Skin:     General: Skin is warm and dry  Coloration: Skin is not pale  Neurological:      General: No focal deficit present  Mental Status: She is alert and oriented to person, place, and time        Motor: No weakness  Psychiatric:         Mood and Affect: Mood normal          Behavior: Behavior normal           Additional Data:     Lab Results:  Results from last 7 days   Lab Units 06/27/22  2327   WBC Thousand/uL 8 84   HEMOGLOBIN g/dL 9 5*   HEMATOCRIT % 30 9*   PLATELETS Thousands/uL 453*   NEUTROS PCT % 79*   LYMPHS PCT % 13*   MONOS PCT % 6   EOS PCT % 1     Results from last 7 days   Lab Units 06/27/22  2327   SODIUM mmol/L 138   POTASSIUM mmol/L 2 8*   CHLORIDE mmol/L 100   CO2 mmol/L 29   BUN mg/dL 12   CREATININE mg/dL 0 73   ANION GAP mmol/L 9   CALCIUM mg/dL 8 6   ALBUMIN g/dL 3 3*   TOTAL BILIRUBIN mg/dL 0 69   ALK PHOS U/L 86   ALT U/L 5*   AST U/L 7*   GLUCOSE RANDOM mg/dL 295*         Results from last 7 days   Lab Units 06/23/22  1909 06/23/22  1834 06/23/22  1732   POC GLUCOSE mg/dl 317* 323* 334*               Imaging: Personally reviewed the following imaging: chest xray  XR chest 1 view portable    (Results Pending)       EKG and Other Studies Reviewed on Admission:   · EKG: NSR  HR 73  RSR" V1  QTc 561  ** Please Note: This note has been constructed using a voice recognition system   **

## 2022-06-28 NOTE — ASSESSMENT & PLAN NOTE
· Patient complaining of anxiety  · Received 1 mg PO Ativan in ED with improvement  · Continue home PRN Ativan  · HOLD home Lexapro in setting of prolonged QTc

## 2022-06-28 NOTE — DISCHARGE INSTRUCTIONS
Be sure to suction routinely to keep your Tracheostomy clear congestion  Continue to take all of your other medications as directed  Follow-up with your primary care physician and pulmonologist within the next 24-48 hours  Return to the emergency department any time for any new or worsening issues

## 2022-06-28 NOTE — MALNUTRITION/BMI
This medical record reflects one or more clinical indicators suggestive of malnutrition  Malnutrition Findings:   Adult Malnutrition type: Acute illness  Adult Degree of Malnutrition: Malnutrition of moderate degree  Malnutrition Characteristics: Weight loss, Inadequate energy    360 Statement: Moderate malnutrition in the context of acute illness r/t inadequate energy intake with fatigue, reduced PO intake as evidenced by energy intake <75% estimated needs x >1 wk, wt decrease of 3 5% x 3 weeks  Will treat with nutrition therapy and recommending oral nutritional supplements  BMI Findings: Body mass index is 26 37 kg/m²  See Nutrition note dated 06/28/22 for additional details  Completed nutrition assessment is viewable in the nutrition documentation

## 2022-06-28 NOTE — ED PROVIDER NOTES
History  Chief Complaint   Patient presents with    Shortness of Breath     Pt presents with inc sob and pain around her trach site  Pt states her throat is sore  Patient states that she likely needs some suctioning of her chronic trach  She has not been able to suction it for most of the day  She states that when she gets chest congestion she also becomes very anxious which he is also reporting this evening  Patient's takes Xanax daily for anxiety and states that her last dose with earlier today  She reports a mild throbbing diffuse headache as well as some crampy abdominal pain which she attributes to having several coughing fits  She denies any other associated systemic symptoms  History provided by:  Patient   used: No    Shortness of Breath  Severity:  Mild  Onset quality:  Gradual  Duration:  2 days  Timing:  Constant  Progression:  Unchanged  Chronicity:  Recurrent  Context: not activity, not pollens, not smoke exposure and not strong odors    Relieved by:  Nothing  Worsened by:  Nothing  Ineffective treatments:  None tried  Associated symptoms: cough, headaches and sputum production    Associated symptoms: no chest pain, no diaphoresis, no hemoptysis, no neck pain, no rash, no syncope, no swollen glands, no vomiting and no wheezing    Risk factors: no prolonged immobilization        Prior to Admission Medications   Prescriptions Last Dose Informant Patient Reported? Taking? Benzocaine-Menthol 15-2 6 MG LOZG  Self No No   Sig: Apply 1 lozenge to the mouth or throat 4 (four) times a day as needed (sore throat)   Patient not taking: No sig reported   Blood Pressure Monitoring (Sphygmomanometer) MISC   No No   Sig: Use 2 (two) times a day   Patient not taking: No sig reported   Brilinta 90 MG   No No   Sig: TAKE 1 TABLET BY MOUTH EVERY 12 HOURS     Insulin Pen Needle (Novofine Pen Needle) 32G X 6 MM MISC  Self No No   Sig: Use 3 (three) times a day with meals   Insulin Pen Needle 31G X 6 MM MISC  Self Yes No   Si Device by Does not apply route 4 (four) times a day   LORazepam (Ativan) 1 mg tablet   No No   Sig: Take 0 5 tablets (0 5 mg total) by mouth every 8 (eight) hours as needed for anxiety or sleep   Lancets MISC  Self Yes No   Sig: Use 1 Device 4 (four) times a day   Magnesium Oxide 400 MG CAPS   No No   Sig: Take 1 tablet (400 mg total) by mouth 2 (two) times a day   acetaminophen (TYLENOL) 160 mg/5 mL suspension  Self No No   Si 4 mL (975 mg total) by Per G Tube route every 6 (six) hours as needed for mild pain or fever   albuterol (2 5 mg/3 mL) 0 083 % nebulizer solution  Self No No   Sig: Take 3 mL (2 5 mg total) by nebulization every 4 (four) hours as needed for wheezing or shortness of breath   amiodarone 100 mg tablet   No No   Sig: TAKE 1 TABLET BY MOUTH DAILY WITH BREAKFAST  apixaban (ELIQUIS) 5 mg  Self No No   Sig: Take 1 tablet (5 mg total) by mouth 2 (two) times a day   atorvastatin (LIPITOR) 40 mg tablet  Self No No   Sig: Take 1 tablet (40 mg total) by mouth daily   bumetanide (BUMEX) 2 mg tablet   No No   Sig: Take 1 tablet (2 mg total) by mouth 2 (two) times a day   chlorhexidine (PERIDEX) 0 12 % solution  Self No No   Sig: Apply 15 mL to the mouth or throat every 12 (twelve) hours   escitalopram (LEXAPRO) 10 mg tablet  Self No No   Sig: TAKE 1 TABLET BY MOUTH EVERY DAY   famotidine (PEPCID) 20 mg/2 5 mL oral suspension  Self No No   Sig: Take 2 5 mL (20 mg total) by mouth 2 (two) times a day   insulin aspart (NovoLOG FlexPen) 100 UNIT/ML injection pen  Self No No   Sig: Inject 2 Units under the skin 3 (three) times a day with meals   insulin glargine (Basaglar KwikPen) 100 units/mL injection pen  Self No No   Sig: Inject 12 Units under the skin in the morning     ipratropium (ATROVENT) 0 02 % nebulizer solution  Self No No   Sig: Take 2 5 mL (0 5 mg total) by nebulization 3 (three) times a day   levalbuterol (XOPENEX) 1 25 mg/0 5 mL nebulizer solution Self No No   Sig: Take 0 5 mL (1 25 mg total) by nebulization 3 (three) times a day   losartan (COZAAR) 50 mg tablet   No No   Sig: Take 1 tablet (50 mg total) by mouth every 12 (twelve) hours   magnesium Oxide (MAG-OX) 400 mg TABS   Yes No   Sig: TAKE 1 TABLET (400 MG TOTAL) BY MOUTH 2 TIMES A DAY   melatonin 3 mg  Self No No   Sig: Take 2 tablets (6 mg total) by mouth daily at bedtime   Patient not taking: No sig reported   metoprolol succinate (TOPROL-XL) 50 mg 24 hr tablet   No No   Sig: Take 1 tablet (50 mg total) by mouth every 12 (twelve) hours   pantoprazole (PROTONIX) 40 mg tablet   No No   Sig: Take 1 tablet (40 mg total) by mouth daily   polyethylene glycol (MIRALAX) 17 g packet  Self No No   Sig: Take 17 g by mouth daily   Patient not taking: No sig reported   potassium chloride (K-DUR,KLOR-CON) 20 mEq tablet   No No   Sig: Take 2 tablets (40 mEq total) by mouth 2 (two) times a day   pregabalin (LYRICA) 75 mg capsule  Self No No   Sig: TAKE ONE CAPSULE EVERY DAY   senna-docusate sodium (SENOKOT S) 8 6-50 mg per tablet  Self No No   Sig: Take 1 tablet by mouth daily at bedtime   Patient not taking: No sig reported   spironolactone (ALDACTONE) 100 mg tablet   No No   Sig: TAKE 1 TABLET BY MOUTH EVERY DAY   traZODone (DESYREL) 50 mg tablet  Self Yes No   Sig: TAKE 0 5 TABLET (25MG) BY MOUTH NIGHTLY AS NEEDED FOR SLEEP  Patient not taking: No sig reported      Facility-Administered Medications: None       Past Medical History:   Diagnosis Date    Anxiety     Aortic aneurysm ([facility] [address] )     Arthritis     Depression     Diabetes mellitus (HCC)     Fibromyalgia     GERD (gastroesophageal reflux disease)     GERD (gastroesophageal reflux disease)     H/O cardiovascular stress test 09/2018    no ischemia  EF 70%   H/O echocardiogram 01/2019    EF 60%  Mild LVH  trivial effusion      Hyperlipidemia     Hypertension     Migraines     Psychiatric disorder     anxiety    Uncontrolled hypertension 2015    Last Assessment & Plan:  BP today above goal <140/90, apparently asymptomatic  Prior BP elevations were attributed to not taking medication  Consider increased medication if persistent on f/u  Past Surgical History:   Procedure Laterality Date    BACK SURGERY      Lumbar epidural steroid injection    CARDIAC CATHETERIZATION N/A 10/22/2021    Procedure: Cardiac pci;  Surgeon: Nikki Will MD;  [facility]; Service: Cardiology    CARDIAC CATHETERIZATION  10/22/2021    Procedure: Cardiac catheterization;  Surgeon: Nikki Will MD;  [facility]; Service: Cardiology    CARDIAC CATHETERIZATION Left 10/27/2021    Procedure: Cardiac Left Heart Cath;  Surgeon: Nick Burt MD;  [facility]; Service: Cardiology    CARDIAC CATHETERIZATION N/A 10/27/2021    Procedure: Cardiac pci;  Surgeon: Nick Burt MD;  [facility]; Service: Cardiology    CARDIAC CATHETERIZATION N/A 10/28/2021    Procedure: Cardiac pci;  Surgeon: Tanesha Shre DO;  [facility]; Service: Cardiology    CARPAL TUNNEL RELEASE Left      SECTION      CHOLECYSTECTOMY      COLONOSCOPY      incomplete    COLONOSCOPY      EYE SURGERY      HYSTERECTOMY      Total    OVARIAN CYST REMOVAL      PEG W/TRACHEOSTOMY PLACEMENT N/A 2021    Procedure: TRACHEOSTOMY WITH INSERTION PEG TUBE;  Surgeon: Thao Fung DO;  [facility];  Service: General    TUBAL LIGATION      UPPER GASTROINTESTINAL ENDOSCOPY         Family History   Problem Relation Age of Onset    Hypertension Mother    [facility] Arthritis Mother     Diabetes Father     Other Sister         renal cell carcinoma    Diabetes Other     Factor V Leiden deficiency Other      I have reviewed and agree with the history as documented      E-Cigarette/Vaping    E-Cigarette Use Never User      E-Cigarette/Vaping Substances    Nicotine No     THC No  CBD No     Flavoring No     Other No     Unknown No      Social History     Tobacco Use    Smoking status: Former Smoker     Packs/day: 1 00     Years: 30 00     Pack years: 30 00     Types: Cigarettes    Smokeless tobacco: Never Used   Vaping Use    Vaping Use: Never used   Substance Use Topics    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   Drug use: Yes     Types: Marijuana     Comment: medical; seldom use       Review of Systems   Constitutional: Negative for diaphoresis  Respiratory: Positive for cough, sputum production and shortness of breath  Negative for hemoptysis and wheezing  Cardiovascular: Negative for chest pain and syncope  Gastrointestinal: Negative for vomiting  Genitourinary: Negative for hematuria  Musculoskeletal: Negative for neck pain  Skin: Negative for rash  Neurological: Positive for headaches  All other systems reviewed and are negative  Physical Exam  Physical Exam  Vitals and nursing note reviewed  Constitutional:       General: She is not in acute distress  Appearance: She is well-developed  HENT:      Head: Normocephalic and atraumatic  Eyes:      Conjunctiva/sclera: Conjunctivae normal    Neck:      Trachea: Tracheostomy present  No abnormal tracheal secretions  Comments: Mild amount of erythema around tracheostomy site  Patient actively coughing whitish looking sputum from her tracheostomy  Cardiovascular:      Rate and Rhythm: Normal rate and regular rhythm  Heart sounds: No murmur heard  Pulmonary:      Effort: Tachypnea present  No respiratory distress  Breath sounds: Normal breath sounds  No decreased breath sounds, wheezing, rhonchi or rales  Abdominal:      Palpations: Abdomen is soft  Tenderness: There is no abdominal tenderness  Musculoskeletal:      Cervical back: Neck supple  No rigidity  Normal range of motion  Right lower leg: No tenderness  No edema        Left lower leg: No tenderness  No edema  Lymphadenopathy:      Cervical: No cervical adenopathy  Skin:     General: Skin is warm and dry  Capillary Refill: Capillary refill takes less than 2 seconds  Neurological:      General: No focal deficit present  Mental Status: She is alert and oriented to person, place, and time  Motor: No weakness           Vital Signs  ED Triage Vitals [06/27/22 2035]   Temperature Pulse Respirations Blood Pressure SpO2   98 2 °F (36 8 °C) 86 (!) 26 (!) 171/116 99 %      Temp Source Heart Rate Source Patient Position - Orthostatic VS BP Location FiO2 (%)   Oral Monitor Lying Right arm --      Pain Score       No Pain           Vitals:    06/27/22 2035 06/27/22 2304 06/28/22 0052 06/28/22 0108   BP: (!) 171/116 155/90 (!) 178/106 168/66   Pulse: 86 76 74    Patient Position - Orthostatic VS: Lying Sitting Lying Lying         Visual Acuity      ED Medications  Medications   magnesium sulfate 2 g/50 mL IVPB (premix) 2 g (2 g Intravenous New Bag 6/28/22 0010)   potassium chloride (K-DUR,KLOR-CON) CR tablet 40 mEq (has no administration in time range)   acetaminophen (TYLENOL) tablet 650 mg (650 mg Oral Given 6/27/22 2126)   LORazepam (ATIVAN) tablet 1 mg (1 mg Oral Given 6/27/22 2126)       Diagnostic Studies  Results Reviewed     Procedure Component Value Units Date/Time    HS Troponin I 4hr [[id number]]     Lab Status: No result Specimen: Blood     HS Troponin I 2hr [[id number]]     Lab Status: No result Specimen: Blood     HS Troponin 0hr (reflex protocol) [[id number]]  (Normal) Collected: 06/27/22 2327    Lab Status: Final result Specimen: Blood from Arm, Right Updated: 06/28/22 0002     hs TnI 0hr 24 ng/L     Comprehensive metabolic panel [[id number]]  (Abnormal) Collected: 06/27/22 2327    Lab Status: Final result Specimen: Blood from Arm, Right Updated: 06/27/22 2355     Sodium 138 mmol/L      Potassium 2 8 mmol/L      Chloride 100 mmol/L      CO2 29 mmol/L      ANION GAP 9 mmol/L      BUN 12 mg/dL      Creatinine 0 73 mg/dL      Glucose 295 mg/dL      Calcium 8 6 mg/dL      Corrected Calcium 9 2 mg/dL      AST 7 U/L      ALT 5 U/L      Alkaline Phosphatase 86 U/L      Total Protein 7 1 g/dL      Albumin 3 3 g/dL      Total Bilirubin 0 69 mg/dL      eGFR 92 ml/min/1 73sq m     Narrative:      National Kidney Disease Foundation guidelines for Chronic Kidney Disease (CKD):     Stage 1 with normal or high GFR (GFR > 90 mL/min/1 73 square meters)    Stage 2 Mild CKD (GFR = 60-89 mL/min/1 73 square meters)    Stage 3A Moderate CKD (GFR = 45-59 mL/min/1 73 square meters)    Stage 3B Moderate CKD (GFR = 30-44 mL/min/1 73 square meters)    Stage 4 Severe CKD (GFR = 15-29 mL/min/1 73 square meters)    Stage 5 End Stage CKD (GFR <15 mL/min/1 73 square meters)  Note: GFR calculation is accurate only with a steady state creatinine    Magnesium [[id number]]  (Abnormal) Collected: 06/27/22 2327    Lab Status: Final result Specimen: Blood from Arm, Right Updated: 06/27/22 2355     Magnesium 1 3 mg/dL     Phosphorus [[id number]]  (Normal) Collected: 06/27/22 2327    Lab Status: Final result Specimen: Blood from Arm, Right Updated: 06/27/22 2355     Phosphorus 3 1 mg/dL     CBC and differential [[id number]]  (Abnormal) Collected: 06/27/22 2327    Lab Status: Final result Specimen: Blood from Arm, Right Updated: 06/27/22 2344     WBC 8 84 Thousand/uL      RBC 4 00 Million/uL      Hemoglobin 9 5 g/dL      Hematocrit 30 9 %      MCV 77 fL      MCH 23 8 pg      MCHC 30 7 g/dL      RDW 16 3 %      MPV 9 7 fL      Platelets 006 Thousands/uL      nRBC 0 /100 WBCs      Neutrophils Relative 79 %      Immat GRANS % 1 %      Lymphocytes Relative 13 %      Monocytes Relative 6 %      Eosinophils Relative 1 %      Basophils Relative 0 %      Neutrophils Absolute 7 01 Thousands/µL      Immature Grans Absolute 0 04 Thousand/uL      Lymphocytes Absolute 1 14 Thousands/µL      Monocytes Absolute 0 57 Thousand/µL      Eosinophils Absolute 0 06 Thousand/µL      Basophils Absolute 0 02 Thousands/µL     NT-BNP PRO [[id number]] Collected: 06/27/22 2327    Lab Status: In process Specimen: Blood from Arm, Right Updated: 06/27/22 2341                 XR chest 1 view portable    (Results Pending)              Procedures  Procedures         ED Course  ED Course as of 06/28/22 0113   Mon Jun 27, 2022   2313 Patient is less anxious and her vital signs have improved  On re-evaluation she still feels as though she is chest tightness and difficulty breathing although she is quite comfortable not tachypneic and satting 100% on trach collar  Considering her medical history have decided to order some blood work and a chest x-ray to rule out any acute cardiopulmonary issues  2324 EKG is a normal sinus rhythm at a rate of 73 beats per minute  Normal axis with prolongation of the QTC  When compared to prior EKG her QTC has been prolonged in the past to us slightly more on this presentation  SBIRT 20yo+    Flowsheet Row Most Recent Value   SBIRT (25 yo +)    In order to provide better care to our patients, we are screening all of our patients for alcohol and drug use  Would it be okay to ask you these screening questions? No Filed at: 06/27/2022 2053                    MDM  Number of Diagnoses or Management Options     Amount and/or Complexity of Data Reviewed  Clinical lab tests: ordered and reviewed  Tests in the radiology section of CPT®: ordered and reviewed  Review and summarize past medical records: yes    Risk of Complications, Morbidity, and/or Mortality  Presenting problems: moderate  Diagnostic procedures: moderate  Management options: low  General comments: Patient is a 79-year-old female with chronic cardiopulmonary disease history who is presenting with shortness of breath  Labs and imaging reviewed x4    Patient is electrolyte abnormalities are dressed but considering her chronic disease history she will likely be admitted for observation for ACS rule out amongst other etiologies  Report given to admitting provider  Disposition  Final diagnoses:   Chest congestion   Anxiety   SOB (shortness of breath)     Time reflects when diagnosis was documented in both MDM as applicable and the Disposition within this note     Time User Action Codes Description Comment    6/27/2022 10:46 PM Robbie Mccauley Add [R09 89] Chest congestion     6/27/2022 10:47 PM Katieaba Georgia Add [F41 9] Anxiety     6/28/2022  1:11 AM Robbie Mccauley Add [R06 02] SOB (shortness of breath)       ED Disposition     ED Disposition   Admit    Condition   Stable    Date/Time   Tue Jun 28, 2022  1:11 AM    Comment   Case was discussed with TARYN and the patient's admission status was agreed to be Admission Status: observation status to the service of Dr Susan Ritter   Follow-up Information     Follow up With Specialties Details Why Contact Info    Whitney Kerr MD Family Medicine Call in 1 day  [address] [place] [address]  [phone]      Your ear nose and throat physician  Call in 1 day            Patient's Medications   Discharge Prescriptions    No medications on file       No discharge procedures on file      PDMP Review       Value Time User    PDMP Reviewed  Yes 6/20/2022  4:32 PM Whitney Kerr MD          ED Provider  Electronically Signed by           Pankaj Mesa MD  06/28/22 0197

## 2022-06-29 VITALS
TEMPERATURE: 97 F | HEART RATE: 64 BPM | RESPIRATION RATE: 16 BRPM | OXYGEN SATURATION: 100 % | HEIGHT: 69 IN | DIASTOLIC BLOOD PRESSURE: 78 MMHG | WEIGHT: 180.2 LBS | SYSTOLIC BLOOD PRESSURE: 145 MMHG | BODY MASS INDEX: 26.69 KG/M2

## 2022-06-29 PROBLEM — R07.9 CHEST PAIN: Status: RESOLVED | Noted: 2022-06-28 | Resolved: 2022-06-29

## 2022-06-29 LAB
ANION GAP SERPL CALCULATED.3IONS-SCNC: 9 MMOL/L (ref 4–13)
ATRIAL RATE: 67 BPM
BUN SERPL-MCNC: 14 MG/DL (ref 5–25)
CALCIUM SERPL-MCNC: 8.5 MG/DL (ref 8.4–10.2)
CHLORIDE SERPL-SCNC: 101 MMOL/L (ref 96–108)
CO2 SERPL-SCNC: 29 MMOL/L (ref 21–32)
CREAT SERPL-MCNC: 0.78 MG/DL (ref 0.6–1.3)
GFR SERPL CREATININE-BSD FRML MDRD: 85 ML/MIN/1.73SQ M
GLUCOSE SERPL-MCNC: 211 MG/DL (ref 65–140)
GLUCOSE SERPL-MCNC: 227 MG/DL (ref 65–140)
GLUCOSE SERPL-MCNC: 260 MG/DL (ref 65–140)
GLUCOSE SERPL-MCNC: 329 MG/DL (ref 65–140)
MAGNESIUM SERPL-MCNC: 2.1 MG/DL (ref 1.9–2.7)
MRSA NOSE QL CULT: NORMAL
P AXIS: 37 DEGREES
POTASSIUM SERPL-SCNC: 3.1 MMOL/L (ref 3.5–5.3)
PR INTERVAL: 190 MS
QRS AXIS: -49 DEGREES
QRSD INTERVAL: 122 MS
QT INTERVAL: 526 MS
QTC INTERVAL: 555 MS
SODIUM SERPL-SCNC: 139 MMOL/L (ref 135–147)
T WAVE AXIS: 4 DEGREES
VENTRICULAR RATE: 67 BPM

## 2022-06-29 PROCEDURE — 93005 ELECTROCARDIOGRAM TRACING: CPT

## 2022-06-29 PROCEDURE — 93010 ELECTROCARDIOGRAM REPORT: CPT | Performed by: INTERNAL MEDICINE

## 2022-06-29 PROCEDURE — 83735 ASSAY OF MAGNESIUM: CPT

## 2022-06-29 PROCEDURE — 82948 REAGENT STRIP/BLOOD GLUCOSE: CPT

## 2022-06-29 PROCEDURE — 80048 BASIC METABOLIC PNL TOTAL CA: CPT

## 2022-06-29 PROCEDURE — 94640 AIRWAY INHALATION TREATMENT: CPT

## 2022-06-29 PROCEDURE — 94760 N-INVAS EAR/PLS OXIMETRY 1: CPT

## 2022-06-29 PROCEDURE — 99217 PR OBSERVATION CARE DISCHARGE MANAGEMENT: CPT | Performed by: INTERNAL MEDICINE

## 2022-06-29 RX ORDER — INSULIN GLARGINE 100 [IU]/ML
20 INJECTION, SOLUTION SUBCUTANEOUS EVERY MORNING
Status: DISCONTINUED | OUTPATIENT
Start: 2022-06-30 | End: 2022-06-29 | Stop reason: HOSPADM

## 2022-06-29 RX ORDER — POTASSIUM CHLORIDE 20MEQ/15ML
40 LIQUID (ML) ORAL ONCE
Status: COMPLETED | OUTPATIENT
Start: 2022-06-29 | End: 2022-06-29

## 2022-06-29 RX ORDER — POTASSIUM CHLORIDE 20 MEQ/1
40 TABLET, EXTENDED RELEASE ORAL ONCE
Status: DISCONTINUED | OUTPATIENT
Start: 2022-06-29 | End: 2022-06-29

## 2022-06-29 RX ORDER — LORAZEPAM 0.5 MG/1
0.5 TABLET ORAL ONCE
Status: COMPLETED | OUTPATIENT
Start: 2022-06-29 | End: 2022-06-29

## 2022-06-29 RX ORDER — INSULIN ASPART 100 [IU]/ML
4 INJECTION, SOLUTION INTRAVENOUS; SUBCUTANEOUS
Qty: 15 ML | Refills: 0
Start: 2022-06-29 | End: 2022-08-18

## 2022-06-29 RX ORDER — INSULIN GLARGINE 100 [IU]/ML
16 INJECTION, SOLUTION SUBCUTANEOUS DAILY
Qty: 15 ML | Refills: 0
Start: 2022-06-29 | End: 2022-08-18

## 2022-06-29 RX ORDER — INSULIN LISPRO 100 [IU]/ML
6 INJECTION, SOLUTION INTRAVENOUS; SUBCUTANEOUS
Status: DISCONTINUED | OUTPATIENT
Start: 2022-06-29 | End: 2022-06-29 | Stop reason: HOSPADM

## 2022-06-29 RX ADMIN — LEVALBUTEROL HYDROCHLORIDE 1.25 MG: 1.25 SOLUTION RESPIRATORY (INHALATION) at 13:49

## 2022-06-29 RX ADMIN — INSULIN LISPRO 6 UNITS: 100 INJECTION, SOLUTION INTRAVENOUS; SUBCUTANEOUS at 11:45

## 2022-06-29 RX ADMIN — FUROSEMIDE 60 MG: 40 TABLET ORAL at 15:38

## 2022-06-29 RX ADMIN — LORAZEPAM 0.5 MG: 0.5 TABLET ORAL at 08:41

## 2022-06-29 RX ADMIN — ATORVASTATIN CALCIUM 40 MG: 40 TABLET, FILM COATED ORAL at 15:38

## 2022-06-29 RX ADMIN — INSULIN LISPRO 3 UNITS: 100 INJECTION, SOLUTION INTRAVENOUS; SUBCUTANEOUS at 16:59

## 2022-06-29 RX ADMIN — METOPROLOL SUCCINATE 50 MG: 50 TABLET, EXTENDED RELEASE ORAL at 03:51

## 2022-06-29 RX ADMIN — SPIRONOLACTONE 100 MG: 25 TABLET ORAL at 08:41

## 2022-06-29 RX ADMIN — POTASSIUM CHLORIDE 40 MEQ: 20 SOLUTION ORAL at 14:16

## 2022-06-29 RX ADMIN — APIXABAN 5 MG: 5 TABLET, FILM COATED ORAL at 17:00

## 2022-06-29 RX ADMIN — INSULIN GLARGINE 12 UNITS: 100 INJECTION, SOLUTION SUBCUTANEOUS at 08:36

## 2022-06-29 RX ADMIN — FUROSEMIDE 60 MG: 40 TABLET ORAL at 08:42

## 2022-06-29 RX ADMIN — TICAGRELOR 90 MG: 90 TABLET ORAL at 08:42

## 2022-06-29 RX ADMIN — INSULIN LISPRO 2 UNITS: 100 INJECTION, SOLUTION INTRAVENOUS; SUBCUTANEOUS at 08:40

## 2022-06-29 RX ADMIN — ACETAMINOPHEN 650 MG: 325 TABLET, FILM COATED ORAL at 13:37

## 2022-06-29 RX ADMIN — IPRATROPIUM BROMIDE 0.5 MG: 0.5 SOLUTION RESPIRATORY (INHALATION) at 13:49

## 2022-06-29 RX ADMIN — FAMOTIDINE 20 MG: 40 POWDER, FOR SUSPENSION ORAL at 17:00

## 2022-06-29 RX ADMIN — INSULIN LISPRO 2 UNITS: 100 INJECTION, SOLUTION INTRAVENOUS; SUBCUTANEOUS at 08:35

## 2022-06-29 RX ADMIN — PREGABALIN 75 MG: 75 CAPSULE ORAL at 08:42

## 2022-06-29 RX ADMIN — GUAIFENESIN AND DEXTROMETHORPHAN 10 ML: 100; 10 SYRUP ORAL at 08:42

## 2022-06-29 RX ADMIN — IPRATROPIUM BROMIDE 0.5 MG: 0.5 SOLUTION RESPIRATORY (INHALATION) at 07:27

## 2022-06-29 RX ADMIN — LORAZEPAM 0.5 MG: 0.5 TABLET ORAL at 15:39

## 2022-06-29 RX ADMIN — GUAIFENESIN AND DEXTROMETHORPHAN 10 ML: 100; 10 SYRUP ORAL at 15:39

## 2022-06-29 RX ADMIN — INSULIN LISPRO 6 UNITS: 100 INJECTION, SOLUTION INTRAVENOUS; SUBCUTANEOUS at 16:59

## 2022-06-29 RX ADMIN — LOSARTAN POTASSIUM 50 MG: 50 TABLET, FILM COATED ORAL at 15:41

## 2022-06-29 RX ADMIN — LEVALBUTEROL HYDROCHLORIDE 1.25 MG: 1.25 SOLUTION RESPIRATORY (INHALATION) at 07:27

## 2022-06-29 RX ADMIN — APIXABAN 5 MG: 5 TABLET, FILM COATED ORAL at 08:43

## 2022-06-29 RX ADMIN — METOPROLOL SUCCINATE 50 MG: 50 TABLET, EXTENDED RELEASE ORAL at 15:41

## 2022-06-29 RX ADMIN — INSULIN LISPRO 5 UNITS: 100 INJECTION, SOLUTION INTRAVENOUS; SUBCUTANEOUS at 11:44

## 2022-06-29 RX ADMIN — AMIODARONE HYDROCHLORIDE 100 MG: 200 TABLET ORAL at 08:42

## 2022-06-29 RX ADMIN — CHLORHEXIDINE GLUCONATE 0.12% ORAL RINSE 15 ML: 1.2 LIQUID ORAL at 08:42

## 2022-06-29 RX ADMIN — FAMOTIDINE 20 MG: 40 POWDER, FOR SUSPENSION ORAL at 08:41

## 2022-06-29 RX ADMIN — LOSARTAN POTASSIUM 50 MG: 50 TABLET, FILM COATED ORAL at 03:51

## 2022-06-29 NOTE — ASSESSMENT & PLAN NOTE
· Continue trach care  · Has not seen ENT specialist since discharge from hospital in November 2021 with last visit recently month ago

## 2022-06-29 NOTE — ASSESSMENT & PLAN NOTE
· Presented with chest tightness, SOB, midsternal chest pain  Cardiac workup with troponins, telemetry w/o any acute finding  EKG with NSR and QT prolongation  Likely noncardiac etiology  · Resolved w/o any intervention

## 2022-06-29 NOTE — DISCHARGE INSTR - AVS FIRST PAGE
Your strongly advised to continue taking your Lasix and insulin as prescribed as that might be the cause of you coming to the hospital     Check your weight regularly  Take low-salt diet  Monitor sugar and avoid taking any insulin if you notice blood sugar < 100 and monitor for signs of hypoglycemia  Take your medications regularly  Continue following up with your ENT specialist as recommended by them    Long acting insulin 16 units at bedtime along with short-acting insulin 4 units at home and follow up with her PCP regarding these changes

## 2022-06-29 NOTE — ASSESSMENT & PLAN NOTE
Malnutrition Findings:   Adult Malnutrition type: Acute illness  Adult Degree of Malnutrition: Malnutrition of moderate degree  Malnutrition Characteristics: Weight loss, Inadequate energy                  360 Statement: Moderate malnutrition in the context of acute illness r/t inadequate energy intake with fatigue, reduced PO intake as evidenced by energy intake <75% estimated needs x >1 wk, wt decrease of 3 5% x 3 weeks  Will treat with nutrition therapy and recommending oral nutritional supplements  BMI Findings: Body mass index is 26 61 kg/m²

## 2022-06-29 NOTE — ASSESSMENT & PLAN NOTE
Lab Results   Component Value Date    HGBA1C 12 7 (H) 05/22/2022       · Uncontrolled blood sugar on presentation  Home regimen of 12 units Lantus and 2 units lispro with meals  · Will increase Lantus 16 units with 4 units lispro along with meals  · Follow-up with PCP regarding your insulin changes    · Hypoglycemia protocol

## 2022-06-29 NOTE — CASE MANAGEMENT
Case Management Discharge Planning Note    Patient name Melinda Arana  Location /-01 MRN [id number]  : 1965 Date 2022       Current Admission Date: 2022  Current Admission Diagnosis:Shortness of breath   Patient Active Problem List    Diagnosis Date Noted    Hypomagnesemia 2022    Moderate protein-calorie malnutrition ([address] ) 2022    Thoracic aortic aneurysm, ruptured ([address] ) 2022    Shortness of breath 2022    Prolonged Q-T interval on ECG 2022    COVID-19 2022    Elevated troponin 2022    Microcytic anemia 2022    Insomnia secondary to anxiety 2022    Chronic combined systolic (congestive) and diastolic (congestive) heart failure ([address] ) 2022    Chronic hypoxemic respiratory failure ([address] ) 03/15/2022    Trache[facility] in place [facility]) 2022    Subglottic stenosis 2022    CAD (coronary artery disease) 2021    Urinary retention 2021    Cerebral aneurysm 2021    Cerebral vascular accident ([address] ) 2021    Acute on chronic respiratory failure with hypoxia ([address] ) 2021    History of Cardiac arrest ([address] ) 2021    A-fib ([address] ) 10/30/2021    History of Ventricular tachycardia ([address] ) 10/27/2021    Cellulitis of left lower extremity 2021    Hypoglycemia 2021    Polypharmacy 2021    Trigger finger, right middle finger 2021    Trigger finger, right ring finger 2021    Diarrhea 2021    Nasal vestibulitis 2021    Orthopnea 2021    CHANTALE (obstructive sleep apnea) 2021    Palpitations 2020    Abscess 10/11/2020    Bacterial vaginosis 2020    Urinary tract infection with hematuria 2020    Epigastric pain 2019    Thoracic aortic aneurysm without rupture ([address] ) 2018    Hypokalemia 2018    Abnormal urinalysis 2018    Vaginal discharge 2018    Yeast vaginitis 2018    Recurrent vaginitis 04/12/2018    Cardiac murmur 12/15/2017    Primary insomnia 12/15/2017    Anxiety 07/26/2017    Depression, recurrent ([facility] ) 07/26/2017    Retinal hemorrhage due to secondary diabetes ([facility] ) 07/26/2017    Fibromyalgia 07/26/2017    Hypertension 07/26/2017    Hypoestrogenism 07/26/2017    Neuropathy 07/26/2017    Chronic pain disorder 06/05/2017    Medically complex patient 06/05/2017    Change in bowel habit 04/24/2017    Screening for colon cancer 12/05/2016    Cough 02/15/2016    Non compliance with medical treatment 01/27/2016    Dizziness 01/26/2016    Gastroesophageal reflux disease with esophagitis 01/26/2016    Vertigo 01/26/2016    Vertebral body hemangioma 08/21/2015    Hemangioma of bone 07/30/2015    Right-sided thoracic back pain 07/23/2015    Thoracic radiculitis 07/23/2015    Carpal tunnel syndrome of left wrist 06/26/2015    Tobacco abuse 06/26/2015    Type 2 diabetes mellitus treated with insulin ([place] [address] ) 06/09/2015    Hyperlipidemia associated with type 2 diabetes mellitus ([place] [address] ) 05/06/2015    Noncompliance with diet and medication regimen 05/06/2015    Shingles 03/25/2015    Diabetic peripheral neuropathy ([place] [address] ) 02/25/2015    Low back pain 02/25/2015    Lumbar radiculopathy 02/25/2015    Overweight 02/25/2015    Sciatica of left side 02/25/2015      LOS (days): 0  Geometric Mean LOS (GMLOS) (days):   Days to GMLOS:     OBJECTIVE:            Current admission status: Observation   Preferred Pharmacy:   [address] [place] [address]  [facility]  Phone: [phone] Fax: [facility] [address] [place] [address] [address]  [address]    [facility] [address]  Phone: [phone] Fax: [facility]  [facility], [address] Tequila Rangel [address] [place] [id number]  Phone: [phone] Fax: Sophia Patel [address] [facility], [address] [facility] 94 8750 [facility] [address]  Phone: [phone] Fax: [phone]    Primary Care Provider: Gema Desai MD    Primary Insurance: Dest[facility] RE[facility]  Secondary Insurance: [address]    DISCHARGE DETAILS:    Discharge planning discussed with[de-identified] patient  Freedom of Choice: Yes  Comments - Freedom of Choice: patient choice for  [facility]         Is the patient interested in [address] at discharge?: Yes  [address] requested[de-identified] Physical Therapy, Nursing, Speech Language Pathology  Home Health Agency Name[de-identified] Other ([facility])  Home Health Services Needed[de-identified] Evaluate Functional Status and Safety, Other (comment), Strengthening/Theraputic Exercises to Improve Function (trach care)  Homebound Criteria Met[de-identified] Uses an Assist Device (i e  cane, walker, etc), Requires the Assistance of Another Person for Safe Ambulation or to Leave the Home  Supporting Clincal Findings[de-identified] Limited Endurance, Fatigues Easliy in [employer]         Other Referral/Resources/Interventions Provided:  Referral Comments: Nurse notified CM of pt need to meet with CM  CM met with pt at bedside, pt requested hhc for speech, nursing and PT  pt has had hhc with [facility] in the past, would like them again  Referral sent to [facility] via ecin, [facility] able to provide [facility] for tomorrow, and require HHC script, f2f, hospitalist progress note - all necessary info sent via Spark           Treatment Team Recommendation: Home with [employer]  Discharge Destination Plan[de-identified] Home with [employer]

## 2022-06-29 NOTE — NURSING NOTE
Patient discharged home with all belongings and transported in her own wheelchair by her son  Discharge instructions reviewed with patient and son  They verbalized understanding and had no further questions  Patient placed herself on her own oxygen tank and tubing  IV removed and site dressed for infection prevention

## 2022-06-29 NOTE — ASSESSMENT & PLAN NOTE
· SOB on presentation  Weight has been improving compared to past   Does not look in fluid overload  No leg swelling all crackles on examination  BNP elevated however has been stable compared to previous records  · Recent echo 4/22 showing preserved EF, moderate hypokinesis of inferior and basal anterolateral walls, moderate concentric hypertrophy    · Continue home Lasix 60 mg b i d   · Daily weights, I&Os, fluid and sodium restriction

## 2022-06-29 NOTE — ASSESSMENT & PLAN NOTE
· Multiple ED visits for similar complaints of SOB  · Currently presented with worsening SOB and need for suction however on presentation not much secretions to suction as per RT  On baseline oxygen requirement of 10 L through trach mask  Likely etiology is viral as Pt states having soreness of throat currently indicating URTI  · Was admitted to [facility] in October 2021 with STEMI s/p PCI on DAPT  Also noticed to have V-tach with multiple cardiac arrest requiring shocks currently on life vest     · Has chronic respiratory failure s/p tracheostomy tube placement and subglottic stenosis with poor follow-up with ENT  · Severe anxiety on p r n  Lorazepam   · CXR on presentation did show presence of some vascular congestion and pleural effusion  Afebrile  Hemodynamically stable  No leukocytosis  · States being noncompliant with Lasix at home  · Currently not in fluid overload  On baseline oxygen requirement  No swelling or crackles on examination  · Managed with home medication, home diuretics, bronchodilators, reassurance    Explained in great detail at bedside regarding patient's current health and no identified etiology  Reassurance given  Continue tracheal care with oxygen supplementation, suction on discharge  Medication compliance was strongly encouraged  Continue Lasix 60 mg b i d  At home  Ativan p r n    Fluid restriction, leg elevation, low-salt diet, daily weights

## 2022-06-29 NOTE — ASSESSMENT & PLAN NOTE
· Patient complaining of anxiety  · Received 1 mg PO Ativan in ED with improvement  · Continue home PRN Ativan

## 2022-06-29 NOTE — SPEECH THERAPY NOTE
Consult for swallowing evaluation received  Patient known to our department from previous hospital stay  Patient with history of chronic trach and inability to tolerate speaking valve secondary to (nehemias) glottic stenosis  No h/o oral/pharyngeal dysphagia when seen earlier this year  I reviewed current records then spoke with RN Bere Mendoza) who reported no overt s/s oral/pharyngeal dysphagia and that pt being d/c this pm (consider OP evaluation (eg esophagram vs VBS study) if/as indicated

## 2022-06-29 NOTE — DISCHARGE SUMMARY
[address] 46  Discharge- Danyel Messenger 1965, 65 y o  female MRN: [id number]  Unit/Bed#: -01 Encounter: [id number]  Primary Care Provider: Ellyn Keith MD   Date and time admitted to hospital: 6/27/2022  8:30 PM    * Shortness of breath  Assessment & Plan  · Multiple ED visits for similar complaints of SOB  · Currently presented with worsening SOB and need for suction however on presentation not much secretions to suction as per RT  On baseline oxygen requirement of 10 L through trach mask  Likely etiology is viral as Pt states having soreness of throat currently indicating URTI  · Was admitted to [facility] in October 2021 with STEMI s/p PCI on DAPT  Also noticed to have V-tach with multiple cardiac arrest requiring shocks currently on life vest     · Has chronic respiratory failure s/p tracheostomy tube placement and subglottic stenosis with poor follow-up with ENT  · Severe anxiety on p r n  Lorazepam   · CXR on presentation did show presence of some vascular congestion and pleural effusion  Afebrile  Hemodynamically stable  No leukocytosis  · States being noncompliant with Lasix at home  · Currently not in fluid overload  On baseline oxygen requirement  No swelling or crackles on examination  · Managed with home medication, home diuretics, bronchodilators, reassurance    Explained in great detail at bedside regarding patient's current health and no identified etiology  Reassurance given  Continue tracheal care with oxygen supplementation, suction on discharge  Medication compliance was strongly encouraged  Continue Lasix 60 mg b i d  At home  Ativan p r n  Fluid restriction, leg elevation, low-salt diet, daily weights    Type 2 diabetes mellitus treated with insulin [facility])  Assessment & Plan  Lab Results   Component Value Date    HGBA1C 12 7 (H) 05/22/2022       · Uncontrolled blood sugar on presentation    Home regimen of 12 units Lantus and 2 units lispro with meals  · Will increase Lantus 16 units with 4 units lispro along with meals  · Follow-up with PCP regarding your insulin changes  · Hypoglycemia protocol     Moderate protein-calorie malnutrition (HCC)  Assessment & Plan  Malnutrition Findings:   Adult Malnutrition type: Acute illness  Adult Degree of Malnutrition: Malnutrition of moderate degree  Malnutrition Characteristics: Weight loss, Inadequate energy                  360 Statement: Moderate malnutrition in the context of acute illness r/t inadequate energy intake with fatigue, reduced PO intake as evidenced by energy intake <75% estimated needs x >1 wk, wt decrease of 3 5% x 3 weeks  Will treat with nutrition therapy and recommending oral nutritional supplements  BMI Findings: Body mass index is 26 61 kg/m²  Hypomagnesemia  Assessment & Plan  · Replace and recheck p r n  Rajni Nissen Prolonged Q-T interval on ECG  Assessment & Plan  · Prolonged QTC  H/o cardiac arrest   Electrolyte stable  · On Life Vest    Chronic combined systolic (congestive) and diastolic (congestive) heart failure (HCC)  Assessment & Plan  · SOB on presentation  Weight has been improving compared to past   Does not look in fluid overload  No leg swelling all crackles on examination  BNP elevated however has been stable compared to previous records  · Recent echo 4/22 showing preserved EF, moderate hypokinesis of inferior and basal anterolateral walls, moderate concentric hypertrophy  · Continue home Lasix 60 mg b i d   · Daily weights, I&Os, fluid and sodium restriction     Chronic hypoxemic respiratory failure (HCC)  Assessment & Plan  · Chronically on 10 L trach mask  · Currently at baseline  · Continue Atrovent, Xopenex, PRN Albuterol   · Respiratory protocol     Tracheostomy in place [facility])  Assessment & Plan  · Continue trach care  · Has not seen ENT specialist since discharge from hospital in November 2021 with last visit recently month ago      History of Ventricular tachycardia (HCC)  Assessment & Plan  · Hx of PAF  · On Life Vest  · Monitor on tele  · Continue home regimen     Hypokalemia  Assessment & Plan  · Hypokalemia and hypomagnesemia on presentation  Replace with supplements  Hypertension  Assessment & Plan  · Continue home regimen    Anxiety  Assessment & Plan  · Patient complaining of anxiety  · Received 1 mg PO Ativan in ED with improvement  · Continue home PRN Ativan    Chest pain-resolved as of 6/29/2022  Assessment & Plan  · Presented with chest tightness, SOB, midsternal chest pain  Cardiac workup with troponins, telemetry w/o any acute finding  EKG with NSR and QT prolongation  Likely noncardiac etiology  · Resolved w/o any intervention  Discharging Resident Physician: Estee Teresa MD  Attending: Chato Robertson MD  PCP: Rosaura Billings MD  Admission Date: 6/27/2022  Admission Date:   Admission Orders (From admission, onward)     Ordered        06/28/22 0111  Place in Observation  Once                      Discharge Date: 06/29/22    Disposition:     Home with [facility]NA Services (Reminder: Complete face to face encounter)    Reason for Admission:  SOB, chest pain    Consultations During Hospital Stay:  · None    Procedures Performed:     No orders of the defined types were placed in this encounter        Diagnosis:    Medical Problems             Resolved Problems  Date Reviewed: 6/29/2022          Resolved    Chest pain 6/29/2022     Resolved by  Estee Teresa MD                Principal Problem:    Shortness of breath  Active Problems:    Type 2 diabetes mellitus treated with insulin (HCC)    Anxiety    Hypertension    Hypokalemia    History of Ventricular tachycardia (HCC)    Tracheostomy in place [facility])    Chronic hypoxemic respiratory failure (HCC)    Chronic combined systolic (congestive) and diastolic (congestive) heart failure (HCC)    Prolonged Q-T interval on ECG    Hypomagnesemia    Moderate protein-calorie malnutrition ([facility] )      Significant Findings / Test Results:     · Hypokalemia, hypomagnesemia requiring supplements  · Chronic respiratory failure requiring 10 L on tracheal mask  · H/o systolic and diastolic CHF  · H/o cardiac arrest  · H/o V-tach  · CXR showing vascular congestion, B/L pleural effusion  · Noncompliant with Lasix at home  · Uncontrolled blood sugar with level 500 on presentation requiring basal bolus insulin increased dosage    Incidental Findings:   · None     Test Results Pending at Discharge (will require follow up): · None     Outpatient Tests Requested:  · None    Complications:  None    Hospital Course:     Parisa Guerra is a 65 y o  female patient who originally presented to the hospital on 6/27/2022 due to worsening SOB  Pt had been evaluated in ED and hospital multiple times for similar complaints  Pt does have H/o severe anxiety with SOB during episodes  Presented with worsening SOB and complaining of increased respiratory secretions however on suctioning Pt had minimal drainage  Was admitted to [facility] in October 2021 with STEMI s/p PCI on DAPT  Also noticed to have V-tach with multiple cardiac arrest requiring shocks currently on life vest   Has chronic respiratory failure s/p tracheostomy tube placement and subglottic stenosis with poor follow-up with ENT  Was discharged with trach mask  Currently on baseline 10 L to trach mask  On evaluation Pt has SOB likely viral etiology given presence of soreness of throat along with component of anxiety  Had complained of chest pain on admission with cardiac workup being-ve, telemetry unremarkable  No changes in EK G  Troponins WN L  CXR did show presence of vascular congestion and B/L pleural effusion however Pt was not in fluid overload status  Pt was hemodynamically stable, afebrile, no leukocytosis suggesting no suspicion of infection from bacterial etiology    Was managed with home medication, home diuretics, bronchodilators along with nebulizers, frequent reassurance  Was advised to continue taking home medication which he is taking  Compliance was strongly encouraged  Continue home dose of Lasix 60 mg b i d     Was found to have hypokalemia and hypomagnesemia on presentation he replaced with supplements  Was also noticed to have high blood sugar on presentation for which her basal bolus dose of insulin was increased on discharge  Was strongly advised to follow up with PCP regarding his insulin changes  Was also advised to follow up with ENT regarding gradual decreasing size of tracheostomy tube if needed  Rest of the details as per assessment and plan  Condition at Discharge: stable     Discharge Day Visit / Exam:     Subjective:  Pt was seen and examined by me at bedside  Hemodynamically stable and afebrile  Currently on tracheal mask saturating well on 10 L  Denies any acute overnight event  Tolerating diet well  Anxiety level has improved and stable  Vitals: Blood Pressure: 151/93 (06/29/22 1102)  Pulse: 67 (06/29/22 1102)  Temperature: (!) 96 9 °F (36 1 °C) (06/29/22 1102)  Temp Source: Tympanic (06/29/22 1102)  Respirations: 16 (06/29/22 1102)  Height: 5' 9" (175 3 cm) (06/28/22 0219)  Weight - Scale: 81 7 kg (180 lb 3 2 oz) (06/29/22 0600)  SpO2: 99 % (06/29/22 1349)  Exam:   Physical Exam  Vitals and nursing note reviewed  Constitutional:       General: She is not in acute distress  Appearance: Normal appearance  She is well-developed  She is not ill-appearing or diaphoretic  HENT:      Head: Normocephalic and atraumatic  Nose: No congestion  Eyes:      General: No scleral icterus  Conjunctiva/sclera: Conjunctivae normal    Neck:      Vascular: No JVD  Comments: Tracheostomy tube noted in place  Cardiovascular:      Rate and Rhythm: Normal rate and regular rhythm  Pulses: Normal pulses  Heart sounds: Normal heart sounds  No murmur heard  No friction rub   No gallop  Pulmonary:      Effort: Pulmonary effort is normal  No respiratory distress  Breath sounds: Normal breath sounds  No rales  Abdominal:      General: There is no distension  Palpations: Abdomen is soft  Tenderness: There is no abdominal tenderness  Musculoskeletal:         General: No swelling or tenderness  Right lower leg: No edema  Left lower leg: No edema  Skin:     General: Skin is warm  Neurological:      General: No focal deficit present  Mental Status: She is alert and oriented to person, place, and time  Mental status is at baseline  Psychiatric:         Mood and Affect: Mood normal          Behavior: Behavior normal          Thought Content: Thought content normal          Discussion with Family:  None    Discharge instructions/Information to patient and family:   See after visit summary for information provided to patient and family  Provisions for Follow-Up Care:  See after visit summary for information related to follow-up care and any pertinent home health orders  Planned Readmission:  None     Discharge Medications:  See after visit summary for reconciled discharge medications provided to patient and family  Discharge Statement :  I spent 25 minutes discharging the patient  This time was spent on the day of discharge  I had direct contact with the patient on the day of discharge  Additional documentation is required if more than 30 minutes were spent on discharge           ** Please Note: This note has been constructed using a voice recognition system **

## 2022-07-01 ENCOUNTER — TRANSITIONAL CARE MANAGEMENT (OUTPATIENT)
Dept: FAMILY MEDICINE CLINIC | Facility: CLINIC | Age: 57
End: 2022-07-01

## 2022-07-01 DIAGNOSIS — I50.43 ACUTE ON CHRONIC COMBINED SYSTOLIC (CONGESTIVE) AND DIASTOLIC (CONGESTIVE) HEART FAILURE (HCC): ICD-10-CM

## 2022-07-05 DIAGNOSIS — E87.6 HYPOKALEMIA: Primary | ICD-10-CM

## 2022-07-05 DIAGNOSIS — E11.9 TYPE 2 DIABETES MELLITUS TREATED WITH INSULIN (HCC): ICD-10-CM

## 2022-07-05 DIAGNOSIS — Z79.4 TYPE 2 DIABETES MELLITUS TREATED WITH INSULIN (HCC): ICD-10-CM

## 2022-07-05 DIAGNOSIS — I50.43 ACUTE ON CHRONIC COMBINED SYSTOLIC (CONGESTIVE) AND DIASTOLIC (CONGESTIVE) HEART FAILURE (HCC): ICD-10-CM

## 2022-07-05 RX ORDER — PEN NEEDLE, DIABETIC 32 GX 1/4"
NEEDLE, DISPOSABLE MISCELLANEOUS
Qty: 100 EACH | Refills: 2 | Status: SHIPPED | OUTPATIENT
Start: 2022-07-05 | End: 2022-09-12 | Stop reason: ALTCHOICE

## 2022-07-05 RX ORDER — POTASSIUM CHLORIDE 3 G/15ML
40 SOLUTION ORAL 2 TIMES DAILY
Qty: 360 ML | Refills: 3 | Status: SHIPPED | OUTPATIENT
Start: 2022-07-05 | End: 2022-08-18

## 2022-07-05 RX ORDER — POTASSIUM CHLORIDE 1500 MG/1
TABLET, EXTENDED RELEASE ORAL
Qty: 360 TABLET | Refills: 1 | Status: SHIPPED | OUTPATIENT
Start: 2022-07-05 | End: 2022-07-05 | Stop reason: ALTCHOICE

## 2022-07-05 RX ORDER — BUMETANIDE 2 MG/1
2 TABLET ORAL 2 TIMES DAILY
Qty: 180 TABLET | Refills: 1 | Status: SHIPPED | OUTPATIENT
Start: 2022-07-05 | End: 2022-08-18

## 2022-07-05 RX ORDER — BUMETANIDE 2 MG/1
TABLET ORAL
Qty: 180 TABLET | Refills: 1 | Status: SHIPPED | OUTPATIENT
Start: 2022-07-05 | End: 2022-07-05 | Stop reason: ALTCHOICE

## 2022-07-06 ENCOUNTER — TELEPHONE (OUTPATIENT)
Dept: ENDOCRINOLOGY | Facility: CLINIC | Age: 57
End: 2022-07-06

## 2022-07-06 NOTE — TELEPHONE ENCOUNTER
Can someone please call patient to schedule a follow up  Last seen 8/2021  We received an order from [facility] her [employer]  It's under [employer] so patient needs an appointment with someone else so we can get this completed

## 2022-07-15 ENCOUNTER — HOSPITAL ENCOUNTER (INPATIENT)
Facility: HOSPITAL | Age: 57
LOS: 4 days | DRG: 291 | End: 2022-07-19
Attending: EMERGENCY MEDICINE | Admitting: STUDENT IN AN ORGANIZED HEALTH CARE EDUCATION/TRAINING PROGRAM
Payer: COMMERCIAL

## 2022-07-15 ENCOUNTER — APPOINTMENT (EMERGENCY)
Dept: RADIOLOGY | Facility: HOSPITAL | Age: 57
DRG: 291 | End: 2022-07-15
Payer: COMMERCIAL

## 2022-07-15 DIAGNOSIS — R06.02 SHORTNESS OF BREATH: Primary | ICD-10-CM

## 2022-07-15 DIAGNOSIS — R07.9 CHEST PAIN, UNSPECIFIED TYPE: ICD-10-CM

## 2022-07-15 DIAGNOSIS — R73.9 HYPERGLYCEMIA: ICD-10-CM

## 2022-07-15 DIAGNOSIS — F41.9 ANXIETY: ICD-10-CM

## 2022-07-15 DIAGNOSIS — J93.9 PNEUMOTHORAX: ICD-10-CM

## 2022-07-15 PROBLEM — I50.23 ACUTE ON CHRONIC HFREF (HEART FAILURE WITH REDUCED EJECTION FRACTION) (HCC): Status: ACTIVE | Noted: 2022-03-21

## 2022-07-15 LAB
2HR DELTA HS TROPONIN: 2 NG/L
4HR DELTA HS TROPONIN: 6 NG/L
ALBUMIN SERPL BCP-MCNC: 3.5 G/DL (ref 3.5–5)
ALP SERPL-CCNC: 95 U/L (ref 34–104)
ALT SERPL W P-5'-P-CCNC: 3 U/L (ref 7–52)
ANION GAP SERPL CALCULATED.3IONS-SCNC: 10 MMOL/L (ref 4–13)
AST SERPL W P-5'-P-CCNC: 4 U/L (ref 13–39)
ATRIAL RATE: 77 BPM
ATRIAL RATE: 77 BPM
BASOPHILS # BLD AUTO: 0.06 THOUSANDS/ΜL (ref 0–0.1)
BASOPHILS NFR BLD AUTO: 1 % (ref 0–1)
BILIRUB SERPL-MCNC: 0.51 MG/DL (ref 0.2–1)
BNP SERPL-MCNC: 472 PG/ML (ref 0–100)
BUN SERPL-MCNC: 12 MG/DL (ref 5–25)
CALCIUM SERPL-MCNC: 9.1 MG/DL (ref 8.4–10.2)
CARDIAC TROPONIN I PNL SERPL HS: 18 NG/L
CARDIAC TROPONIN I PNL SERPL HS: 20 NG/L
CARDIAC TROPONIN I PNL SERPL HS: 24 NG/L
CHLORIDE SERPL-SCNC: 94 MMOL/L (ref 96–108)
CO2 SERPL-SCNC: 30 MMOL/L (ref 21–32)
CREAT SERPL-MCNC: 0.83 MG/DL (ref 0.6–1.3)
EOSINOPHIL # BLD AUTO: 0.47 THOUSAND/ΜL (ref 0–0.61)
EOSINOPHIL NFR BLD AUTO: 4 % (ref 0–6)
ERYTHROCYTE [DISTWIDTH] IN BLOOD BY AUTOMATED COUNT: 15.8 % (ref 11.6–15.1)
FERRITIN SERPL-MCNC: 119 NG/ML (ref 8–388)
GFR SERPL CREATININE-BSD FRML MDRD: 79 ML/MIN/1.73SQ M
GLUCOSE SERPL-MCNC: 119 MG/DL (ref 65–140)
GLUCOSE SERPL-MCNC: 290 MG/DL (ref 65–140)
GLUCOSE SERPL-MCNC: 297 MG/DL (ref 65–140)
GLUCOSE SERPL-MCNC: 452 MG/DL (ref 65–140)
GLUCOSE SERPL-MCNC: 456 MG/DL (ref 65–140)
HCT VFR BLD AUTO: 33.6 % (ref 34.8–46.1)
HGB BLD-MCNC: 10.1 G/DL (ref 11.5–15.4)
IMM GRANULOCYTES # BLD AUTO: 0.06 THOUSAND/UL (ref 0–0.2)
IMM GRANULOCYTES NFR BLD AUTO: 1 % (ref 0–2)
IRON SATN MFR SERPL: 9 % (ref 15–50)
IRON SERPL-MCNC: 29 UG/DL (ref 50–170)
LYMPHOCYTES # BLD AUTO: 1.76 THOUSANDS/ΜL (ref 0.6–4.47)
LYMPHOCYTES NFR BLD AUTO: 16 % (ref 14–44)
MCH RBC QN AUTO: 23.2 PG (ref 26.8–34.3)
MCHC RBC AUTO-ENTMCNC: 30.1 G/DL (ref 31.4–37.4)
MCV RBC AUTO: 77 FL (ref 82–98)
MONOCYTES # BLD AUTO: 0.64 THOUSAND/ΜL (ref 0.17–1.22)
MONOCYTES NFR BLD AUTO: 6 % (ref 4–12)
NEUTROPHILS # BLD AUTO: 8.19 THOUSANDS/ΜL (ref 1.85–7.62)
NEUTS SEG NFR BLD AUTO: 72 % (ref 43–75)
NRBC BLD AUTO-RTO: 0 /100 WBCS
P AXIS: 54 DEGREES
P AXIS: 76 DEGREES
PLATELET # BLD AUTO: 613 THOUSANDS/UL (ref 149–390)
PMV BLD AUTO: 9.9 FL (ref 8.9–12.7)
POTASSIUM SERPL-SCNC: 3.5 MMOL/L (ref 3.5–5.3)
PR INTERVAL: 188 MS
PR INTERVAL: 188 MS
PROT SERPL-MCNC: 8.3 G/DL (ref 6.4–8.4)
QRS AXIS: -57 DEGREES
QRS AXIS: -61 DEGREES
QRSD INTERVAL: 122 MS
QRSD INTERVAL: 130 MS
QT INTERVAL: 482 MS
QT INTERVAL: 490 MS
QTC INTERVAL: 545 MS
QTC INTERVAL: 554 MS
RBC # BLD AUTO: 4.35 MILLION/UL (ref 3.81–5.12)
SODIUM SERPL-SCNC: 134 MMOL/L (ref 135–147)
T WAVE AXIS: 42 DEGREES
T WAVE AXIS: 52 DEGREES
TIBC SERPL-MCNC: 332 UG/DL (ref 250–450)
VENTRICULAR RATE: 77 BPM
VENTRICULAR RATE: 77 BPM
WBC # BLD AUTO: 11.18 THOUSAND/UL (ref 4.31–10.16)

## 2022-07-15 PROCEDURE — 84484 ASSAY OF TROPONIN QUANT: CPT | Performed by: EMERGENCY MEDICINE

## 2022-07-15 PROCEDURE — 94664 DEMO&/EVAL PT USE INHALER: CPT

## 2022-07-15 PROCEDURE — 85025 COMPLETE CBC W/AUTO DIFF WBC: CPT | Performed by: EMERGENCY MEDICINE

## 2022-07-15 PROCEDURE — 83880 ASSAY OF NATRIURETIC PEPTIDE: CPT

## 2022-07-15 PROCEDURE — 82948 REAGENT STRIP/BLOOD GLUCOSE: CPT

## 2022-07-15 PROCEDURE — 80053 COMPREHEN METABOLIC PANEL: CPT | Performed by: EMERGENCY MEDICINE

## 2022-07-15 PROCEDURE — 71045 X-RAY EXAM CHEST 1 VIEW: CPT

## 2022-07-15 PROCEDURE — 92610 EVALUATE SWALLOWING FUNCTION: CPT

## 2022-07-15 PROCEDURE — 83550 IRON BINDING TEST: CPT

## 2022-07-15 PROCEDURE — 36415 COLL VENOUS BLD VENIPUNCTURE: CPT | Performed by: EMERGENCY MEDICINE

## 2022-07-15 PROCEDURE — 82728 ASSAY OF FERRITIN: CPT

## 2022-07-15 PROCEDURE — 99285 EMERGENCY DEPT VISIT HI MDM: CPT | Performed by: EMERGENCY MEDICINE

## 2022-07-15 PROCEDURE — 99285 EMERGENCY DEPT VISIT HI MDM: CPT

## 2022-07-15 PROCEDURE — 94640 AIRWAY INHALATION TREATMENT: CPT

## 2022-07-15 PROCEDURE — 87081 CULTURE SCREEN ONLY: CPT | Performed by: INTERNAL MEDICINE

## 2022-07-15 PROCEDURE — 93010 ELECTROCARDIOGRAM REPORT: CPT | Performed by: INTERNAL MEDICINE

## 2022-07-15 PROCEDURE — 83540 ASSAY OF IRON: CPT

## 2022-07-15 PROCEDURE — 99219 PR INITIAL OBSERVATION CARE/DAY 50 MINUTES: CPT | Performed by: STUDENT IN AN ORGANIZED HEALTH CARE EDUCATION/TRAINING PROGRAM

## 2022-07-15 PROCEDURE — 93005 ELECTROCARDIOGRAM TRACING: CPT

## 2022-07-15 PROCEDURE — 94760 N-INVAS EAR/PLS OXIMETRY 1: CPT

## 2022-07-15 RX ORDER — INSULIN LISPRO 100 [IU]/ML
10 INJECTION, SOLUTION INTRAVENOUS; SUBCUTANEOUS
Status: DISCONTINUED | OUTPATIENT
Start: 2022-07-15 | End: 2022-07-15

## 2022-07-15 RX ORDER — INSULIN LISPRO 100 [IU]/ML
1-6 INJECTION, SOLUTION INTRAVENOUS; SUBCUTANEOUS
Status: DISCONTINUED | OUTPATIENT
Start: 2022-07-15 | End: 2022-07-19

## 2022-07-15 RX ORDER — INSULIN LISPRO 100 [IU]/ML
1-6 INJECTION, SOLUTION INTRAVENOUS; SUBCUTANEOUS
Status: DISCONTINUED | OUTPATIENT
Start: 2022-07-15 | End: 2022-07-15

## 2022-07-15 RX ORDER — LORAZEPAM 1 MG/1
1 TABLET ORAL ONCE
Status: COMPLETED | OUTPATIENT
Start: 2022-07-15 | End: 2022-07-15

## 2022-07-15 RX ORDER — INSULIN GLARGINE 100 [IU]/ML
16 INJECTION, SOLUTION SUBCUTANEOUS
Status: DISCONTINUED | OUTPATIENT
Start: 2022-07-15 | End: 2022-07-16

## 2022-07-15 RX ORDER — INSULIN LISPRO 100 [IU]/ML
4 INJECTION, SOLUTION INTRAVENOUS; SUBCUTANEOUS
Status: DISCONTINUED | OUTPATIENT
Start: 2022-07-15 | End: 2022-07-15

## 2022-07-15 RX ORDER — LEVALBUTEROL 1.25 MG/.5ML
1.25 SOLUTION, CONCENTRATE RESPIRATORY (INHALATION)
Status: DISCONTINUED | OUTPATIENT
Start: 2022-07-15 | End: 2022-07-15

## 2022-07-15 RX ORDER — FUROSEMIDE 10 MG/ML
80 INJECTION INTRAMUSCULAR; INTRAVENOUS ONCE
Status: COMPLETED | OUTPATIENT
Start: 2022-07-15 | End: 2022-07-15

## 2022-07-15 RX ORDER — ESCITALOPRAM OXALATE 10 MG/1
10 TABLET ORAL DAILY
Status: DISCONTINUED | OUTPATIENT
Start: 2022-07-15 | End: 2022-07-19 | Stop reason: HOSPADM

## 2022-07-15 RX ORDER — ACETAMINOPHEN 325 MG/1
650 TABLET ORAL EVERY 6 HOURS PRN
Status: DISCONTINUED | OUTPATIENT
Start: 2022-07-15 | End: 2022-07-19 | Stop reason: HOSPADM

## 2022-07-15 RX ORDER — LEVALBUTEROL INHALATION SOLUTION 1.25 MG/3ML
1.25 SOLUTION RESPIRATORY (INHALATION)
Status: DISCONTINUED | OUTPATIENT
Start: 2022-07-15 | End: 2022-07-19 | Stop reason: HOSPADM

## 2022-07-15 RX ORDER — PREGABALIN 75 MG/1
75 CAPSULE ORAL DAILY
Status: DISCONTINUED | OUTPATIENT
Start: 2022-07-15 | End: 2022-07-19 | Stop reason: HOSPADM

## 2022-07-15 RX ORDER — INSULIN GLARGINE 100 [IU]/ML
14 INJECTION, SOLUTION SUBCUTANEOUS
Status: DISCONTINUED | OUTPATIENT
Start: 2022-07-15 | End: 2022-07-15

## 2022-07-15 RX ORDER — LOSARTAN POTASSIUM 50 MG/1
50 TABLET ORAL EVERY 12 HOURS
Status: DISCONTINUED | OUTPATIENT
Start: 2022-07-15 | End: 2022-07-18

## 2022-07-15 RX ORDER — PANTOPRAZOLE SODIUM 40 MG/1
40 TABLET, DELAYED RELEASE ORAL
Status: DISCONTINUED | OUTPATIENT
Start: 2022-07-15 | End: 2022-07-19 | Stop reason: HOSPADM

## 2022-07-15 RX ORDER — BUMETANIDE 1 MG/1
2 TABLET ORAL 2 TIMES DAILY
Status: DISCONTINUED | OUTPATIENT
Start: 2022-07-15 | End: 2022-07-18

## 2022-07-15 RX ORDER — LORAZEPAM 0.5 MG/1
0.5 TABLET ORAL EVERY 8 HOURS PRN
Status: DISCONTINUED | OUTPATIENT
Start: 2022-07-15 | End: 2022-07-19 | Stop reason: HOSPADM

## 2022-07-15 RX ORDER — BUMETANIDE 1 MG/1
2 TABLET ORAL 2 TIMES DAILY
Status: DISCONTINUED | OUTPATIENT
Start: 2022-07-15 | End: 2022-07-15

## 2022-07-15 RX ORDER — ASPIRIN 81 MG/1
324 TABLET, CHEWABLE ORAL ONCE
Status: COMPLETED | OUTPATIENT
Start: 2022-07-15 | End: 2022-07-15

## 2022-07-15 RX ORDER — ATORVASTATIN CALCIUM 40 MG/1
40 TABLET, FILM COATED ORAL
Status: DISCONTINUED | OUTPATIENT
Start: 2022-07-15 | End: 2022-07-19 | Stop reason: HOSPADM

## 2022-07-15 RX ORDER — ALBUTEROL SULFATE 2.5 MG/3ML
2.5 SOLUTION RESPIRATORY (INHALATION) EVERY 4 HOURS PRN
Status: DISCONTINUED | OUTPATIENT
Start: 2022-07-15 | End: 2022-07-19 | Stop reason: HOSPADM

## 2022-07-15 RX ORDER — GUAIFENESIN 600 MG/1
1200 TABLET, EXTENDED RELEASE ORAL EVERY 12 HOURS SCHEDULED
Status: DISCONTINUED | OUTPATIENT
Start: 2022-07-15 | End: 2022-07-19 | Stop reason: HOSPADM

## 2022-07-15 RX ORDER — SPIRONOLACTONE 25 MG/1
100 TABLET ORAL DAILY
Status: DISCONTINUED | OUTPATIENT
Start: 2022-07-15 | End: 2022-07-18

## 2022-07-15 RX ORDER — METOPROLOL SUCCINATE 50 MG/1
50 TABLET, EXTENDED RELEASE ORAL EVERY 12 HOURS
Status: DISCONTINUED | OUTPATIENT
Start: 2022-07-15 | End: 2022-07-19 | Stop reason: HOSPADM

## 2022-07-15 RX ORDER — INSULIN LISPRO 100 [IU]/ML
7 INJECTION, SOLUTION INTRAVENOUS; SUBCUTANEOUS
Status: DISCONTINUED | OUTPATIENT
Start: 2022-07-15 | End: 2022-07-16

## 2022-07-15 RX ORDER — AMIODARONE HYDROCHLORIDE 200 MG/1
100 TABLET ORAL
Status: DISCONTINUED | OUTPATIENT
Start: 2022-07-15 | End: 2022-07-19 | Stop reason: HOSPADM

## 2022-07-15 RX ORDER — ESCITALOPRAM OXALATE 10 MG/1
TABLET ORAL
Qty: 90 TABLET | Refills: 1 | Status: SHIPPED | OUTPATIENT
Start: 2022-07-15 | End: 2022-10-27 | Stop reason: SDUPTHER

## 2022-07-15 RX ADMIN — INSULIN LISPRO 4 UNITS: 100 INJECTION, SOLUTION INTRAVENOUS; SUBCUTANEOUS at 11:56

## 2022-07-15 RX ADMIN — LOSARTAN POTASSIUM 50 MG: 50 TABLET, FILM COATED ORAL at 18:11

## 2022-07-15 RX ADMIN — BUMETANIDE 2 MG: 1 TABLET ORAL at 18:12

## 2022-07-15 RX ADMIN — LORAZEPAM 0.5 MG: 0.5 TABLET ORAL at 20:21

## 2022-07-15 RX ADMIN — ACETAMINOPHEN 650 MG: 325 TABLET, FILM COATED ORAL at 20:21

## 2022-07-15 RX ADMIN — TICAGRELOR 90 MG: 90 TABLET ORAL at 06:41

## 2022-07-15 RX ADMIN — SPIRONOLACTONE 100 MG: 25 TABLET ORAL at 09:28

## 2022-07-15 RX ADMIN — PREGABALIN 75 MG: 75 CAPSULE ORAL at 09:28

## 2022-07-15 RX ADMIN — ALBUTEROL SULFATE 2.5 MG: 2.5 SOLUTION RESPIRATORY (INHALATION) at 11:49

## 2022-07-15 RX ADMIN — ATORVASTATIN CALCIUM 40 MG: 40 TABLET, FILM COATED ORAL at 18:11

## 2022-07-15 RX ADMIN — METOPROLOL SUCCINATE 50 MG: 50 TABLET, EXTENDED RELEASE ORAL at 06:43

## 2022-07-15 RX ADMIN — LEVALBUTEROL HYDROCHLORIDE 1.25 MG: 1.25 SOLUTION RESPIRATORY (INHALATION) at 07:33

## 2022-07-15 RX ADMIN — PANTOPRAZOLE SODIUM 40 MG: 40 TABLET, DELAYED RELEASE ORAL at 06:41

## 2022-07-15 RX ADMIN — IPRATROPIUM BROMIDE 0.5 MG: 0.5 SOLUTION RESPIRATORY (INHALATION) at 14:25

## 2022-07-15 RX ADMIN — GUAIFENESIN 1200 MG: 600 TABLET ORAL at 09:27

## 2022-07-15 RX ADMIN — TICAGRELOR 90 MG: 90 TABLET ORAL at 18:12

## 2022-07-15 RX ADMIN — INSULIN LISPRO 7 UNITS: 100 INJECTION, SOLUTION INTRAVENOUS; SUBCUTANEOUS at 11:58

## 2022-07-15 RX ADMIN — INSULIN LISPRO 7 UNITS: 100 INJECTION, SOLUTION INTRAVENOUS; SUBCUTANEOUS at 18:10

## 2022-07-15 RX ADMIN — INSULIN LISPRO 4 UNITS: 100 INJECTION, SOLUTION INTRAVENOUS; SUBCUTANEOUS at 22:12

## 2022-07-15 RX ADMIN — FUROSEMIDE 80 MG: 10 INJECTION, SOLUTION INTRAMUSCULAR; INTRAVENOUS at 06:35

## 2022-07-15 RX ADMIN — ESCITALOPRAM OXALATE 10 MG: 10 TABLET ORAL at 09:27

## 2022-07-15 RX ADMIN — INSULIN GLARGINE 16 UNITS: 100 INJECTION, SOLUTION SUBCUTANEOUS at 22:12

## 2022-07-15 RX ADMIN — IPRATROPIUM BROMIDE 0.5 MG: 0.5 SOLUTION RESPIRATORY (INHALATION) at 07:32

## 2022-07-15 RX ADMIN — SODIUM CHLORIDE 1000 ML: 0.9 INJECTION, SOLUTION INTRAVENOUS at 05:33

## 2022-07-15 RX ADMIN — INSULIN LISPRO 7 UNITS: 100 INJECTION, SOLUTION INTRAVENOUS; SUBCUTANEOUS at 09:25

## 2022-07-15 RX ADMIN — LORAZEPAM 1 MG: 1 TABLET ORAL at 03:04

## 2022-07-15 RX ADMIN — APIXABAN 5 MG: 5 TABLET, FILM COATED ORAL at 18:12

## 2022-07-15 RX ADMIN — ASPIRIN 324 MG: 81 TABLET, CHEWABLE ORAL at 05:32

## 2022-07-15 RX ADMIN — GUAIFENESIN 1200 MG: 600 TABLET ORAL at 20:21

## 2022-07-15 RX ADMIN — INSULIN LISPRO 6 UNITS: 100 INJECTION, SOLUTION INTRAVENOUS; SUBCUTANEOUS at 09:24

## 2022-07-15 RX ADMIN — LORAZEPAM 0.5 MG: 0.5 TABLET ORAL at 11:56

## 2022-07-15 RX ADMIN — IPRATROPIUM BROMIDE 0.5 MG: 0.5 SOLUTION RESPIRATORY (INHALATION) at 19:25

## 2022-07-15 RX ADMIN — APIXABAN 5 MG: 5 TABLET, FILM COATED ORAL at 09:28

## 2022-07-15 RX ADMIN — LOSARTAN POTASSIUM 50 MG: 50 TABLET, FILM COATED ORAL at 06:43

## 2022-07-15 RX ADMIN — LEVALBUTEROL HYDROCHLORIDE 1.25 MG: 1.25 SOLUTION RESPIRATORY (INHALATION) at 19:25

## 2022-07-15 RX ADMIN — AMIODARONE HYDROCHLORIDE 100 MG: 200 TABLET ORAL at 06:35

## 2022-07-15 RX ADMIN — METOPROLOL SUCCINATE 50 MG: 50 TABLET, EXTENDED RELEASE ORAL at 18:12

## 2022-07-15 RX ADMIN — INSULIN HUMAN 10 UNITS: 100 INJECTION, SOLUTION PARENTERAL at 09:26

## 2022-07-15 RX ADMIN — LEVALBUTEROL HYDROCHLORIDE 1.25 MG: 1.25 SOLUTION RESPIRATORY (INHALATION) at 14:25

## 2022-07-15 NOTE — RESPIRATORY THERAPY NOTE
RT Protocol Note  Cynthia Hermosillo 65 y o  female MRN: [id number]  Unit/Bed#: -01 Encounter: [id number]    Assessment    Principal Problem:    Chest pain  Active Problems:    Anxiety    Hypertension    Type 2 diabetes mellitus treated with insulin (HCC)    History of Ventricular tachycardia (HCC)    A-fib (HCC)    Chronic hypoxemic respiratory failure (Formerly [facility])    Acute on chronic HFrEF (heart failure with reduced ejection fraction) (Formerly [facility])    Microcytic anemia    Shortness of breath      Home Pulmonary Medications:  Albuterol PRN    Home Devices/Therapy: (P) Home O2    Past Medical History:   Diagnosis Date    Anxiety     Aortic aneurysm (HCC)     Arthritis     Depression     Diabetes mellitus (HCC)     Fibromyalgia     GERD (gastroesophageal reflux disease)     GERD (gastroesophageal reflux disease)     H/O cardiovascular stress test 09/2018    no ischemia  EF 70%  H/O echocardiogram 01/2019    EF 60%  Mild LVH  trivial effusion  Hyperlipidemia     Hypertension     Migraines     Psychiatric disorder     anxiety    Uncontrolled hypertension 2/25/2015    Last Assessment & Plan:  BP today above goal <140/90, apparently asymptomatic  Prior BP elevations were attributed to not taking medication  Consider increased medication if persistent on f/u  Social History     Socioeconomic History    Marital status: Legally      Spouse name: None    Number of children: None    Years of education: None    Highest education level: None   Occupational History    None   Tobacco Use    Smoking status: Former Smoker     Packs/day: 1 00     Years: 30 00     Pack years: 30 00     Types: Cigarettes    Smokeless tobacco: Never Used   Vaping Use    Vaping Use: Never used   Substance and Sexual Activity    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       Drug use: Yes     Types: Marijuana     Comment: medical; seldom use    Sexual activity: Yes     Birth control/protection: Female Sterilization Comment: Monogamous relationship with monogamous partner   Other Topics Concern    None   Social History Narrative    Most recent tobacco use screenin2019    Do you currently or have you served in the [employer]: No    Were you activated, into active duty, as a member of the [employer] or as a Reservist: No    Advance directive: No    Chewing tobacco: none    Alcohol intake: None    Marital status: Single    Live alone or with others: with others    Family history of heart disease:    aortic aneurysm    High cholesterol: Yes    High blood pressure: Yes    Exercise level: Heavy    Overweight: Yes    Obese: Yes    Diabetes: Yes    General stress level: High    Diet: Regular    Caffeine intake: Occasional    Guns present in home: No    Seat belts used routinely: Yes    Sexual orientation: Heterosexual    Sunscreen used routinely: No    Seat belt/car seat used routinely: Yes    Exercise-How often do you exercise: as tolerated    Do you have regular medical check ups: Yes    Do you have regular dental check ups: Yes    Illicit drugs-years of use:    recovery 23 years - HX of     Social Determinants of Health     Financial Resource Strain: Not on file   Food Insecurity: Food Insecurity Present    Worried About [address] [facility] in the Last Year: Sometimes true    Ran Out of Food in the Last Year: Sometimes true   Transportation Needs: No Transportation Needs    Lack of Transportation (Medical): No    Lack of Transportation (Non-Medical):  No   Physical Activity: Not on file   Stress: Not on file   Social Connections: Not on file   Intimate Partner Violence: Not on file   Housing Stability: Low Risk     Unable to Pay for Housing in the Last Year: No    Number of Places Lived in the Last Year: 1    Unstable Housing in the Last Year: No       Subjective         Objective    Physical Exam:   Assessment Type: (P) Post-treatment  General Appearance: (P) Awake, Alert  Respiratory Pattern: (P) Normal  Bilateral Breath Sounds: (P) Diminished    Vitals:  Blood pressure 154/90, pulse (P) 71, temperature 97 6 °F (36 4 °C), temperature source Tympanic, resp  rate 16, height 5' 9" (1 753 m), weight 80 1 kg (176 lb 9 6 oz), SpO2 (P) 100 %  Imaging and other studies: I have personally reviewed pertinent reports  Plan    Respiratory Plan: (P) Home Bronchodilator Patient pathway        Resp Comments: (P) Assessed pt per protocol  Pt has a history of CHF  Breath sounds are diminished  Pt is satting 100% on her baseline on 9-10L trach mask  Continued with schedule treatment  Will continue to monitor

## 2022-07-15 NOTE — PLAN OF CARE
Problem: CARDIOVASCULAR - ADULT  Goal: Absence of cardiac dysrhythmias or at baseline rhythm  Description: INTERVENTIONS:  - Continuous cardiac monitoring, vital signs, obtain 12 lead EKG if ordered  - Administer antiarrhythmic and heart rate control medications as ordered  - Monitor electrolytes and administer replacement therapy as ordered  Outcome: Progressing     Problem: RESPIRATORY - ADULT  Goal: Achieves optimal ventilation and oxygenation  Description: INTERVENTIONS:  - Assess for changes in respiratory status  - Assess for changes in mentation and behavior  - Position to facilitate oxygenation and minimize respiratory effort  - Oxygen administered by appropriate delivery if ordered  - Initiate smoking cessation education as indicated  - Encourage broncho-pulmonary hygiene including cough, deep breathe, Incentive Spirometry  - Assess the need for suctioning and aspirate as needed  - Assess and instruct to report SOB or any respiratory difficulty  - Respiratory Therapy support as indicated  Outcome: Progressing     Problem: METABOLIC, FLUID AND ELECTROLYTES - ADULT  Goal: Glucose maintained within target range  Description: INTERVENTIONS:  - Monitor Blood Glucose as ordered  - Assess for signs and symptoms of hyperglycemia and hypoglycemia  - Administer ordered medications to maintain glucose within target range  - Assess nutritional intake and initiate nutrition service referral as needed  Outcome: Progressing

## 2022-07-15 NOTE — ASSESSMENT & PLAN NOTE
· With multiple ED visits and admissions for same   Again with usual presentation presenting for suctioning  · Multiple contributing factors including known medical non-compliance, HFrEF, severe anxiety, chronic hypoxic respiratory failure with tracheostomy, CAD  · Suspect mild decompensated HF  · Check BNP, CXR does not appear with b/l pleural effusions   · Give 1 time dose of lasix 80mg  · Trach care, suctioning, supp O2   · Fluid restriction, I&Os, daily weights

## 2022-07-15 NOTE — ED PROVIDER NOTES
History  Chief Complaint   Patient presents with    Respiratory Distress     Pt presents w/ respiratory distress w/ trach  Patient with a history of a chronic tracheostomy presents to the emergency department with shortness of breath and chest congestion secondary to what she believes is I need suctioning  Patient is a frequent consumer this emergency department for similar concerns in the past   Patient also states that she is experiencing some anxiety which often occurs when she gets like this  She denies any other associated systemic symptoms  Prior to Admission Medications   Prescriptions Last Dose Informant Patient Reported? Taking? Benzocaine-Menthol 15-2 6 MG LOZG  Self No No   Sig: Apply 1 lozenge to the mouth or throat 4 (four) times a day as needed (sore throat)   Patient not taking: No sig reported   Blood Pressure Monitoring (Sphygmomanometer) MISC   No No   Sig: Use 2 (two) times a day   Patient not taking: No sig reported   Brilinta 90 MG   No No   Sig: TAKE 1 TABLET BY MOUTH EVERY 12 HOURS  Insulin Pen Needle 31G X 6 MM MISC  Self Yes No   Si Device by Does not apply route 4 (four) times a day   LORazepam (Ativan) 1 mg tablet   No No   Sig: Take 0 5 tablets (0 5 mg total) by mouth every 8 (eight) hours as needed for anxiety or sleep   Lancets MISC  Self Yes No   Sig: Use 1 Device 4 (four) times a day   Novofine Pen Needle 32G X 6 MM MISC   No No   Sig: USE 3 (THREE) TIMES A DAY WITH MEALS   acetaminophen (TYLENOL) 160 mg/5 mL suspension  Self No No   Si 4 mL (975 mg total) by Per G Tube route every 6 (six) hours as needed for mild pain or fever   albuterol (2 5 mg/3 mL) 0 083 % nebulizer solution  Self No No   Sig: Take 3 mL (2 5 mg total) by nebulization every 4 (four) hours as needed for wheezing or shortness of breath   amiodarone 100 mg tablet   No No   Sig: TAKE 1 TABLET BY MOUTH DAILY WITH BREAKFAST     apixaban (ELIQUIS) 5 mg  Self No No   Sig: Take 1 tablet (5 mg total) by mouth 2 (two) times a day   atorvastatin (LIPITOR) 40 mg tablet  Self No No   Sig: Take 1 tablet (40 mg total) by mouth daily   bumetanide (BUMEX) 2 mg tablet   No No   Sig: Take 1 tablet (2 mg total) by mouth 2 (two) times a day   chlorhexidine (PERIDEX) 0 12 % solution  Self No No   Sig: Apply 15 mL to the mouth or throat every 12 (twelve) hours   escitalopram (LEXAPRO) 10 mg tablet  Self No No   Sig: TAKE 1 TABLET BY MOUTH EVERY DAY   famotidine (PEPCID) 20 mg/2 5 mL oral suspension  Self No No   Sig: Take 2 5 mL (20 mg total) by mouth 2 (two) times a day   insulin aspart (NovoLOG FlexPen) 100 UNIT/ML injection pen   No No   Sig: Inject 4 Units under the skin 3 (three) times a day with meals   insulin glargine (Basaglar KwikPen) 100 units/mL injection pen   No No   Sig: Inject 16 Units under the skin daily   ipratropium (ATROVENT) 0 02 % nebulizer solution  Self No No   Sig: Take 2 5 mL (0 5 mg total) by nebulization 3 (three) times a day   levalbuterol (XOPENEX) 1 25 mg/0 5 mL nebulizer solution  Self No No   Sig: Take 0 5 mL (1 25 mg total) by nebulization 3 (three) times a day   losartan (COZAAR) 50 mg tablet   No No   Sig: Take 1 tablet (50 mg total) by mouth every 12 (twelve) hours   magnesium Oxide (MAG-OX) 400 mg TABS   Yes No   Sig: TAKE 1 TABLET (400 MG TOTAL) BY MOUTH 2 TIMES A DAY   melatonin 3 mg  Self No No   Sig: Take 2 tablets (6 mg total) by mouth daily at bedtime   Patient not taking: No sig reported   metoprolol succinate (TOPROL-XL) 50 mg 24 hr tablet   No No   Sig: Take 1 tablet (50 mg total) by mouth every 12 (twelve) hours   pantoprazole (PROTONIX) 40 mg tablet   No No   Sig: Take 1 tablet (40 mg total) by mouth daily   polyethylene glycol (MIRALAX) 17 g packet  Self No No   Sig: Take 17 g by mouth daily   Patient not taking: No sig reported   potassium chloride 40 MEQ/15ML (20%) oral solution   No No   Sig: Take 15 mL (40 mEq total) by mouth 2 (two) times a day   pregabalin (LYRICA) 75 mg capsule  Self No No   Sig: TAKE ONE CAPSULE EVERY DAY   senna-docusate sodium (SENOKOT S) 8 6-50 mg per tablet  Self No No   Sig: Take 1 tablet by mouth daily at bedtime   Patient not taking: No sig reported   spironolactone (ALDACTONE) 100 mg tablet   No No   Sig: TAKE 1 TABLET BY MOUTH EVERY DAY   traZODone (DESYREL) 50 mg tablet  Self Yes No   Sig: TAKE 0 5 TABLET (25MG) BY MOUTH NIGHTLY AS NEEDED FOR SLEEP  Patient not taking: No sig reported      Facility-Administered Medications: None       Past Medical History:   Diagnosis Date    Anxiety     Aortic aneurysm ([address] )     Arthritis     Depression     Diabetes mellitus (HCC)     Fibromyalgia     GERD (gastroesophageal reflux disease)     GERD (gastroesophageal reflux disease)     H/O cardiovascular stress test 09/2018    no ischemia  EF 70%   H/O echocardiogram 01/2019    EF 60%  Mild LVH  trivial effusion   Hyperlipidemia     Hypertension     Migraines     Psychiatric disorder     anxiety    Uncontrolled hypertension 2/25/2015    Last Assessment & Plan:  BP today above goal <140/90, apparently asymptomatic  Prior BP elevations were attributed to not taking medication  Consider increased medication if persistent on f/u  Past Surgical History:   Procedure Laterality Date    BACK SURGERY      Lumbar epidural steroid injection    CARDIAC CATHETERIZATION N/A 10/22/2021    Procedure: Cardiac pci;  Surgeon: Gabby Graf MD;  [facility]; Service: Cardiology    CARDIAC CATHETERIZATION  10/22/2021    Procedure: Cardiac catheterization;  Surgeon: Gabby Graf MD;  [facility]; Service: Cardiology    CARDIAC CATHETERIZATION Left 10/27/2021    Procedure: Cardiac Left Heart Cath;  Surgeon: Rick Montilla MD;  [facility];   Service: Cardiology    CARDIAC CATHETERIZATION N/A 10/27/2021    Procedure: Cardiac pci;  Surgeon: Rick Montilla MD;  [facility];  Service: Cardiology    CARDIAC CATHETERIZATION N/A 10/28/2021    Procedure: Cardiac pci;  Surgeon: Layla Mukherjee DO;  [facility]; Service: Cardiology    CARPAL TUNNEL RELEASE Left      SECTION      CHOLECYSTECTOMY      COLONOSCOPY      incomplete    COLONOSCOPY      EYE SURGERY      HYSTERECTOMY      Total    OVARIAN CYST REMOVAL      PEG W/TRACHEOSTOMY PLACEMENT N/A 2021    Procedure: TRACHEOSTOMY WITH INSERTION PEG TUBE;  Surgeon: Annmarie Fung DO;  Location: [facility];  Service: General    TUBAL LIGATION      UPPER GASTROINTESTINAL ENDOSCOPY         Family History   Problem Relation Age of Onset    Hypertension Mother    W[employer] Arthritis Mother     Diabetes Father     Other Sister         renal cell carcinoma    Diabetes Other     Factor V Leiden deficiency Other      I have reviewed and agree with the history as documented  E-Cigarette/Vaping    E-Cigarette Use Never User      E-Cigarette/Vaping Substances    Nicotine No     THC No     CBD No     Flavoring No     Other No     Unknown No      Social History     Tobacco Use    Smoking status: Former Smoker     Packs/day: 1 00     Years: 30 00     Pack years: 30 00     Types: Cigarettes    Smokeless tobacco: Never Used   Vaping Use    Vaping Use: Never used   Substance Use Topics    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   Drug use: Yes     Types: Marijuana     Comment: medical; seldom use       Review of Systems   Constitutional: Negative for fever  HENT: Positive for congestion  Respiratory: Positive for shortness of breath  Negative for cough  Cardiovascular: Negative for chest pain  Gastrointestinal: Negative for abdominal pain, diarrhea, nausea and vomiting  Musculoskeletal: Negative for back pain  Skin: Negative for color change  Neurological: Negative for light-headedness  Psychiatric/Behavioral: The patient is nervous/anxious      All other systems reviewed and are negative  Physical Exam  Physical Exam  Vitals and nursing note reviewed  Constitutional:       General: She is not in acute distress  Appearance: She is well-developed  HENT:      Head: Normocephalic and atraumatic  Mouth/Throat:      Mouth: Mucous membranes are moist    Eyes:      Conjunctiva/sclera: Conjunctivae normal    Cardiovascular:      Rate and Rhythm: Normal rate and regular rhythm  Pulses: Normal pulses  Heart sounds: No murmur heard  Pulmonary:      Effort: Pulmonary effort is normal  No respiratory distress  Breath sounds: Normal breath sounds  No stridor  No wheezing or rhonchi  Abdominal:      Palpations: Abdomen is soft  Tenderness: There is no abdominal tenderness  Musculoskeletal:      Cervical back: Neck supple  Skin:     General: Skin is warm and dry  Neurological:      General: No focal deficit present  Mental Status: She is alert  Mental status is at baseline           Vital Signs  ED Triage Vitals   Temperature Pulse Respirations Blood Pressure SpO2   07/15/22 0300 07/15/22 0256 07/15/22 0256 07/15/22 0256 07/15/22 0256   98 5 °F (36 9 °C) 74 22 164/91 100 %      Temp Source Heart Rate Source Patient Position - Orthostatic VS BP Location FiO2 (%)   07/15/22 0300 07/15/22 0256 07/15/22 0256 07/15/22 0256 --   Oral Monitor Lying Right arm       Pain Score       --                  Vitals:    07/15/22 0256 07/15/22 0300 07/15/22 0540 07/15/22 0545   BP: 164/91 164/91  166/94   Pulse: 74 73 75    Patient Position - Orthostatic VS: Lying Lying           Visual Acuity      ED Medications  Medications   sodium chloride 0 9 % bolus 1,000 mL (1,000 mL Intravenous New Bag 7/15/22 0533)   LORazepam (ATIVAN) tablet 1 mg (1 mg Oral Given 7/15/22 0304)   aspirin chewable tablet 324 mg (324 mg Oral Given 7/15/22 0532)       Diagnostic Studies  Results Reviewed     Procedure Component Value Units Date/Time    HS Troponin I 2hr [[id number]] Collected: 07/15/22 0533    Lab Status:  In process Specimen: Blood from Line, Venous Updated: 07/15/22 0544    HS Troponin I 4hr [[id number]]     Lab Status: No result Specimen: Blood     HS Troponin 0hr (reflex protocol) [[id number]]  (Normal) Collected: 07/15/22 0334    Lab Status: Final result Specimen: Blood from Arm, Left Updated: 07/15/22 0402     hs TnI 0hr 18 ng/L     Comprehensive metabolic panel [[id number]]  (Abnormal) Collected: 07/15/22 0334    Lab Status: Final result Specimen: Blood from Arm, Left Updated: 07/15/22 0358     Sodium 134 mmol/L      Potassium 3 5 mmol/L      Chloride 94 mmol/L      CO2 30 mmol/L      ANION GAP 10 mmol/L      BUN 12 mg/dL      Creatinine 0 83 mg/dL      Glucose 452 mg/dL      Calcium 9 1 mg/dL      AST 4 U/L      ALT 3 U/L      Alkaline Phosphatase 95 U/L      Total Protein 8 3 g/dL      Albumin 3 5 g/dL      Total Bilirubin 0 51 mg/dL      eGFR 79 ml/min/1 73sq m     Narrative:      [facility] guidelines for Chronic Kidney Disease (CKD):     Stage 1 with normal or high GFR (GFR > 90 mL/min/1 73 square meters)    Stage 2 Mild CKD (GFR = 60-89 mL/min/1 73 square meters)    Stage 3A Moderate CKD (GFR = 45-59 mL/min/1 73 square meters)    Stage 3B Moderate CKD (GFR = 30-44 mL/min/1 73 square meters)    Stage 4 Severe CKD (GFR = 15-29 mL/min/1 73 square meters)    Stage 5 End Stage CKD (GFR <15 mL/min/1 73 square meters)  Note: GFR calculation is accurate only with a steady state creatinine    CBC and differential [[id number]]  (Abnormal) Collected: 07/15/22 0334    Lab Status: Final result Specimen: Blood from Arm, Left Updated: 07/15/22 0338     WBC 11 18 Thousand/uL      RBC 4 35 Million/uL      Hemoglobin 10 1 g/dL      Hematocrit 33 6 %      MCV 77 fL      MCH 23 2 pg      MCHC 30 1 g/dL      RDW 15 8 %      MPV 9 9 fL      Platelets 886 Thousands/uL      nRBC 0 /100 WBCs      Neutrophils Relative 72 %      Immat GRANS % 1 % Lymphocytes Relative 16 %      Monocytes Relative 6 %      Eosinophils Relative 4 %      Basophils Relative 1 %      Neutrophils Absolute 8 19 Thousands/µL      Immature Grans Absolute 0 06 Thousand/uL      Lymphocytes Absolute 1 76 Thousands/µL      Monocytes Absolute 0 64 Thousand/µL      Eosinophils Absolute 0 47 Thousand/µL      Basophils Absolute 0 06 Thousands/µL                  XR chest 1 view portable    (Results Pending)              Procedures  Procedures         ED Course  ED Course as of 07/15/22 0545   Fri Jul 15, 2022   0328 Patient informs bedside nurse that she is now having chest pain  Order an EKG for evaluation purposes as well as some cardiac labs  0500 Patient resting comfortably  Repeat examination benign  There is no evidence of respiratory distress at this time  First initial troponin is negative  Patient waiting for 2nd troponin  0544 Repeat EKG is a normal sinus rhythm at 77 beats per minute  Normal axis with a slightly prolonged QTC  No ST elevation MI noted  EKG is similar to her EKG that was done 2 hours ago  HEART Risk Score    Flowsheet Row Most Recent Value   Heart Score Risk Calculator    History 1 Filed at: 07/15/2022 0544   ECG 1 Filed at: 07/15/2022 0544   Age 1 Filed at: 07/15/2022 0544   Risk Factors 2 Filed at: 07/15/2022 0544   Troponin 0 Filed at: 07/15/2022 0544   HEART Score 5 Filed at: 07/15/2022 0544                        SBIRT 20yo+    Flowsheet Row Most Recent Value   SBIRT (23 yo +)    In order to provide better care to our patients, we are screening all of our patients for alcohol and drug use  Would it be okay to ask you these screening questions?  No Filed at: 07/15/2022 0231                    MDM  Number of Diagnoses or Management Options     Amount and/or Complexity of Data Reviewed  Clinical lab tests: reviewed and ordered  Tests in the radiology section of CPT®: ordered and reviewed  Review and summarize past medical records: yes    Risk of Complications, Morbidity, and/or Mortality  General comments: Patient is a 60-year-old woman who presented to the emergency department should shortness of breath and intermittent chest pain  Patient's chest pain has come off and on while in the emergency department  She has had serial EKGs that do not show any ST elevation MI and a negative troponin  Patient is hyperglycemic as well  She has a heart score of 5 will be observed in the hospital for cardiac evaluation and continued management  Report given to admitting provider  Patient Progress  Patient progress: stable      Disposition  Final diagnoses:   Shortness of breath   Chest pain, unspecified type   Hyperglycemia     Time reflects when diagnosis was documented in both MDM as applicable and the Disposition within this note     Time User Action Codes Description Comment    7/15/2022  5:03 AM Bharti Bridget Add [R06 02] Shortness of breath     7/15/2022  5:03 AM Bharti Bridget Add [R07 9] Chest pain, unspecified type     7/15/2022  5:33 AM Bharti Bridget Add [R73 9] Hyperglycemia       ED Disposition     ED Disposition   Admit    Condition   Stable    Date/Time   Fri Jul 15, 2022  5:33 AM    Comment   Case was discussed with TARYN and the patient's admission status was agreed to be Admission Status: observation status to the service of Dr Azam Howard   Follow-up Information    None         Patient's Medications   Discharge Prescriptions    No medications on file       No discharge procedures on file      PDMP Review       Value Time User    PDMP Reviewed  Yes 6/28/2022  1:39 AM Karli Mcdermott          ED Provider  Electronically Signed by           Bessie Jarvis MD  07/15/22 5333

## 2022-07-15 NOTE — UTILIZATION REVIEW
Initial Clinical Review    Admission: Date/Time/Statement:   Admission Orders (From admission, onward)     Ordered        07/15/22 1636  Inpatient Admission  Once            07/15/22 0535  Place in Observation  Once                      Orders Placed This Encounter   Procedures    Inpatient Admission     Standing Status:   Standing     Number of Occurrences:   1     Order Specific Question:   Level of Care     Answer:   Med Surg [16]     Order Specific Question:   Estimated length of stay     Answer:   More than 2 Midnights     Order Specific Question:   Certification     Answer:   I certify that inpatient services are medically necessary for this patient for a duration of greater than two midnights  See H&P and MD Progress Notes for additional information about the patient's course of treatment  ED Arrival Information     Expected   -    Arrival   7/15/2022 02:21    Acuity   Urgent            Means of arrival   Ambulance    Escorted by   [facility]    Service   Hospitalist    Admission type   Urgent            Arrival complaint   Resp  distress           Chief Complaint   Patient presents with    Respiratory Distress     Pt presents w/ respiratory distress w/ trach  Initial Presentation: 65 y o  female   To ER via EMS from home  past medical history of heart failure with reduced ejection fraction, V-tach, AFib on Eliquis, insulin dependent diabetes mellitus, presents with chest pain and shortness of breath over last 24 hours  Reports complicance w/diet (na restriction)   Meds/insulin  Na restriction  Pt anxious,  Ill appearing,  Trach noted  Decreased breath sounds b/l bases  Trace b/l LE edema  Glucose in 400's  CXR w/b/l pleural effusions  Will admit  OBS Status,   MS level of care for r/o ACS  AND  Management of  Suspected acute on chronic HF  Cont tele, Trend trops & serial ekg's  Given 1x lasix, cont home bumex    Cont nebs,  Trach care, suctioning, supp O2  Prn  Closely monitor volume status w/daily standing wgt, I/O and fluid / na restriction  Initiate sliding scale and long acting insulin regimens  Would recommend stress testing at d/c due to co-morbidities and frequent hospitalization for same  Speech to eval swallowing     Date:   7/15      Day 2: Pt is satting 100% on her baseline on 9-10L trach mask  Co  Odynophagia ,  Food retention,  Pain w/swallowing  SPEECH therapy evaluated - rec aspriation precautions/ reg diet w/thin liquids :  NOTE,  ENT 6/21/22: pt noted to have diffuse edema affecting upper airway (epiglottis and VC); op speech tx was recommended for speech and swallowing, also Modified barium swallow  VBS  Already scheduled for  7/21  Outpt               ED Triage Vitals   Temperature Pulse Respirations Blood Pressure SpO2   07/15/22 0300 07/15/22 0256 07/15/22 0256 07/15/22 0256 07/15/22 0256   98 5 °F (36 9 °C) 74 22 164/91 100 %      Temp Source Heart Rate Source Patient Position - Orthostatic VS BP Location FiO2 (%)   07/15/22 0300 07/15/22 0256 07/15/22 0256 07/15/22 0256 07/15/22 0937   Oral Monitor Lying Right arm 30      Pain Score       07/15/22 0637       No Pain          Wt Readings from Last 1 Encounters:   07/15/22 80 1 kg (176 lb 9 6 oz)     Additional Vital Signs:   07/15/22 1500 96 9 °F (36 1 °C) Abnormal  80 19 134/77 -- 92 % -- -- --   07/15/22 1425 -- -- -- -- -- 98 % 30 9 L/min Trach mask   07/15/22 1150 -- -- -- -- -- 100 % 30 9 L/min Trach mask   07/15/22 1104 96 7 °F (35 9 °C) Abnormal  67 20 149/92 -- 98 % -- -- --   07/15/22 0937 -- -- -- -- -- -- 30 9 L/min Trach mask   07/15/22 0825 -- 71 -- -- -- 100 % -- -- --   07/15/22 0733 -- -- -- -- -- 100 % -- 9 L/min Trach mask   07/15/22 0726 97 6 °F (36 4 °C) 67 16 154/90 -- 100 % -- -- --   07/15/22 0627 98 5 °F (36 9 °C) 72 22 166/97 126 100 % -- 15 L/min Trach mask   07/15/22 0545 -- -- -- 166/94 -- -- -- -- --   07/15/22 0540 -- 75 22 -- -- 98 % -- -- Trach mask 07/15/22 0312 -- -- -- -- -- -- -- 13 L/min Trach mask   07/15/22 0300 98 5 °F (36 9 °C) 73 24 Abnormal  164/91 109 100 % -- 13 L/min Trach mask     Pertinent Labs/Diagnostic Test Results:   Echo 2/2022: LVEF 50%, moderate hypokinesis of inferior and anterolateral wall  Normal diastolic function    EKG (7/15)  Normal sinus rhythm  Right bundle branch block  Left anterior fascicular block  Abnormal ECG  When compared with ECG of 15-JUL-2022 03:38, (unconfirmed)  No significant change was found      XR chest 1 view portable   Final Result by Faustino Ko MD (07/15 1206)   Interstitial edema with small effusions and bibasilar atelectasis              Results from last 7 days   Lab Units 07/15/22  0334   WBC Thousand/uL 11 18*   HEMOGLOBIN g/dL 10 1*   HEMATOCRIT % 33 6*   PLATELETS Thousands/uL 613*   NEUTROS ABS Thousands/µL 8 19*         Results from last 7 days   Lab Units 07/15/22  0334   SODIUM mmol/L 134*   POTASSIUM mmol/L 3 5   CHLORIDE mmol/L 94*   CO2 mmol/L 30   ANION GAP mmol/L 10   BUN mg/dL 12   CREATININE mg/dL 0 83   EGFR ml/min/1 73sq m 79   CALCIUM mg/dL 9 1     Results from last 7 days   Lab Units 07/15/22  0334   AST U/L 4*   ALT U/L 3*   ALK PHOS U/L 95   TOTAL PROTEIN g/dL 8 3   ALBUMIN g/dL 3 5   TOTAL BILIRUBIN mg/dL 0 51     Results from last 7 days   Lab Units 07/15/22  1522 07/15/22  1115 07/15/22  0638   POC GLUCOSE mg/dl 119 297* 456*     Results from last 7 days   Lab Units 07/15/22  0334   GLUCOSE RANDOM mg/dL 452*     Results from last 7 days   Lab Units 07/15/22  0744 07/15/22  0533 07/15/22  0334   HS TNI 0HR ng/L  --   --  18   HS TNI 2HR ng/L  --  20  --    HSTNI D2 ng/L  --  2  --    HS TNI 4HR ng/L 24  --   --    HSTNI D4 ng/L 6  --   --      Results from last 7 days   Lab Units 07/15/22  0550   BNP pg/mL 472*     Results from last 7 days   Lab Units 07/15/22  0334   FERRITIN ng/mL 119     ED Treatment:   Medication Administration from 07/15/2022 0221 to 07/15/2022 0602 Date/Time Order Dose Route Action     07/15/2022 0304 LORazepam (ATIVAN) tablet 1 mg 1 mg Oral Given     07/15/2022 0532 aspirin chewable tablet 324 mg 324 mg Oral Given     07/15/2022 0533 sodium chloride 0 9 % bolus 1,000 mL 1,000 mL Intravenous New Bag        Past Medical History:   Diagnosis Date    Anxiety     Aortic aneurysm (HCC)     Arthritis     Depression     Diabetes mellitus (HCC)     Fibromyalgia     GERD (gastroesophageal reflux disease)     GERD (gastroesophageal reflux disease)     H/O cardiovascular stress test 09/2018    no ischemia  EF 70%   H/O echocardiogram 01/2019    EF 60%  Mild LVH  trivial effusion   Hyperlipidemia     Hypertension     Migraines     Psychiatric disorder     anxiety    Uncontrolled hypertension 2/25/2015    Last Assessment & Plan:  BP today above goal <140/90, apparently asymptomatic  Prior BP elevations were attributed to not taking medication  Consider increased medication if persistent on f/u       Present on Admission:   [facility]   Chronic hypoxemic respiratory failure ([facility])   Shortness of breath   Hypertension   Acute on chronic HFrEF (heart failure with reduced ejection fraction) ([facility])   Anxiety   History of Ventricular tachycardia ([facility])   Microcytic anemia      Admitting Diagnosis: Shortness of breath [R06 02]  Respiratory distress [R06 03]  Hyperglycemia [R73 9]  Chest pain, unspecified type [R07 9]  Age/Sex: 65 y o  female  Admission Orders:    SEE ABOVE    Up w/assist;  Aspiration precautions;  ENSURE nutrition supplements ;   DIET  Lo carb, fluid restriction 1800 ml,  Na 2 gm       Scheduled Medications:  amiodarone, 100 mg, Oral, Daily With Breakfast  apixaban, 5 mg, Oral, BID  atorvastatin, 40 mg, Oral, Daily With Dinner  bumetanide, 2 mg, Oral, BID  escitalopram, 10 mg, Oral, Daily  guaiFENesin, 1,200 mg, Oral, Q12H CEFERINO  insulin glargine, 16 Units, Subcutaneous, HS  insulin lispro, 1-6 Units, Subcutaneous, TID AC  insulin lispro, 1-6 Units, Subcutaneous, HS  insulin lispro, 1-6 Units, Subcutaneous, HS  insulin lispro, 7 Units, Subcutaneous, TID With Meals  ipratropium, 0 5 mg, Nebulization, TID  levalbuterol, 1 25 mg, Nebulization, TID  losartan, 50 mg, Oral, Q12H  metoprolol succinate, 50 mg, Oral, Q12H  pantoprazole, 40 mg, Oral, Early Morning  pregabalin, 75 mg, Oral, Daily  spironolactone, 100 mg, Oral, Daily  ticagrelor, 90 mg, Oral, Q12H      Continuous IV Infusions:     PRN Meds:  acetaminophen, 650 mg, Oral, Q6H PRN  albuterol, 2 5 mg, Nebulization, Q4H PRN    7/15 @  1150  LORazepam, 0 5 mg, Oral, Q8H PRN    7/15 @ 1200 noon         None    Network Utilization Review Department  ATTENTION: Please call with any questions or concerns to [phone] and carefully listen to the prompts so that you are directed to the right person  All voicemails are confidential   [facility] all requests for admission clinical reviews, approved or denied determinations and any other requests to dedicated fax number below belonging to the campus where the patient is receiving treatment   List of dedicated fax numbers for the Facilities:  [address] DENIALS (Administrative/Medical Necessity) [phone]   [address] (Maternity/NICU/Pediatrics) [phone]   [address]  [address] [facility] [place] [address] [id number] [place] [address] [phone] [facility] [phone]

## 2022-07-15 NOTE — ASSESSMENT & PLAN NOTE
· C/o squeezing centralized chest pain since yesterday morning occurring at rest  Now resolved   Some associated SOB  · Likely noncardiac  · Had cardiac cath in 2019 with 90% occlusion of mid cx s/p PATRICA  · Recent echo from 2/2022 reviewed   · RALPH 3  · Trend troponin with ECG  · Monitor on tele    · Would recommend stress testing at d/c due to co-morbidities and frequent hospitalization for same

## 2022-07-15 NOTE — H&P
[address] 1965, 65 y o  female MRN: [id number]  Unit/Bed#: -01 Encounter: [id number]  Primary Care Provider: Brooklyn Liriano MD   Date and time admitted to hospital: 7/15/2022  2:23 AM    * Chest pain  Assessment & Plan  · C/o squeezing centralized chest pain since yesterday morning occurring at rest  Now resolved  Some associated SOB  · Likely noncardiac  · Had cardiac cath in 2019 with 90% occlusion of mid cx s/p PATRICA  · Recent echo from 2/2022 reviewed   · RALPH 3  · Trend troponin with ECG  · Monitor on tele    · Would recommend stress testing at d/c due to co-morbidities and frequent hospitalization for same    Shortness of breath  Assessment & Plan  · With multiple ED visits and admissions for same  Again with usual presentation presenting for suctioning  · Multiple contributing factors including known medical non-compliance, HFrEF, severe anxiety, chronic hypoxic respiratory failure with tracheostomy, CAD  · Suspect mild decompensated HF  · Check BNP, CXR does not appear with b/l pleural effusions   · Give 1 time dose of lasix 80mg  · Trach care, suctioning, supp O2   · Fluid restriction, I&Os, daily weights       Acute on chronic HFrEF (heart failure with reduced ejection fraction) ([facility])  Assessment & Plan  Wt Readings from Last 3 Encounters:   06/29/22 81 7 kg (180 lb 3 2 oz)   06/21/22 84 kg (185 lb 3 oz)   06/06/22 84 kg (185 lb 3 2 oz)     · Appears mildly volume overloaded with trace edema and rales at b/l bases  CXR appears with b/l pleural effusions  BNP pending  · Echo 2/2022: LVEF 50%, moderate hypokinesis of inferior and anterolateral wall  Normal diastolic function  · Diuretic: Bumex 2g b i d , Spironolactone 100mg  · Give 1 time dose of lasix 80mg  Plan to resume home bumex this evening if improved     · Daily weights, I&Os  · Na/fluid restriction         Type 2 diabetes mellitus treated with insulin [facility])  Assessment & Plan  Lab Results Component Value Date    HGBA1C 12 7 (H) 05/22/2022       No results for input(s): POCGLU in the last 72 hours  Blood Sugar Average: Last 72 hrs:     · Uncontrolled per A1c  Presented with hyperglycemia , fortunately non-acidotic, no AG, no DKA  · received 1L NS in ED  · Check BG now --> 456  Give 5 U regular insulin  · Home regimen: Recently updated with last admission to Lantus 16U hs, Humalog 4U t i d  w/ meals   · Continue home regimen, add SSI and acu-checks ac/hs  · Hypoglycemia protocol     Anxiety  Assessment & Plan  · Continue home ativan p r n  Microcytic anemia  Assessment & Plan  · Hgb 11 18, MCV 77  · Last iron panel 10/21 revealing NERI  · Not currently taking iron supps  · Check iron panel    Chronic hypoxemic respiratory failure (HCC)  Assessment & Plan  · W/ tracheostomy  Baseline FiO2 35%  · Trach care  · Continue atrovent, xopenex, p r n  albuterol    A-fib (HCC)  Assessment & Plan  · NSR on admission   · Continue Eliquis, amiodarone, metoprolol    History of Ventricular tachycardia (HCC)  Assessment & Plan  · W/ life vest    Hypertension  Assessment & Plan  · Continue home medications    VTE Pharmacologic Prophylaxis: VTE Score: 3 Eliquis  Code Status: Level 1 - Full Code   Discussion with family: Patient declined call to   Anticipated Length of Stay: Patient will be admitted on an observation basis with an anticipated length of stay of less than 2 midnights secondary to chest pain r/o  Total Time for Visit, including Counseling / Coordination of Care: 60 minutes Greater than 50% of this total time spent on direct patient counseling and coordination of care  Chief Complaint: chest pain, SOB    History of Present Illness:  Miguel Ángel Do is a 65 y o  female with a PMH of HFrEF, V tach, A fib on Eliquis, IDDM, chronic hypoxic respiratory failure with trach who presents with chest pain shortness is breath over the past day    Reports onset of chest pain at rest yesterday morning described as a squeezing type of pain  Associated with some shortness of breath  No diaphoresis, radiation down the arm, nausea/vomiting  Reports pain is different than her usual chest pain that she gets  Reports pain is now resolved  Also has complaints of shortness of breath that is made worse when laying flat  Also reports a nonproductive cough  She denies fever, chills, sick contacts, abdominal pain, nausea/vomiting/diarrhea, urinary complaints  Does report compliance with medications at home  Reports compliance with sodium restriction  Also noted to have hyperglycemia on admission  Reports compliance with insulin and did take her Lantus last evening  Review of Systems:  Review of Systems   Constitutional: Negative for chills, diaphoresis and fever  HENT: Negative for congestion  Respiratory: Positive for cough and shortness of breath  Negative for choking, chest tightness and wheezing  Cardiovascular: Positive for chest pain and leg swelling  Negative for palpitations  Gastrointestinal: Negative for abdominal pain, diarrhea, nausea and vomiting  Endocrine: Negative for polyuria  Genitourinary: Negative for difficulty urinating, dysuria and frequency  Musculoskeletal: Negative for myalgias  Skin: Negative for rash  Neurological: Negative for dizziness, weakness, light-headedness and headaches  Psychiatric/Behavioral: Negative for sleep disturbance  Past Medical and Surgical History:   Past Medical History:   Diagnosis Date    Anxiety     Aortic aneurysm ([facility] [address] )     Arthritis     Depression     Diabetes mellitus (HCC)     Fibromyalgia     GERD (gastroesophageal reflux disease)     GERD (gastroesophageal reflux disease)     H/O cardiovascular stress test 09/2018    no ischemia  EF 70%   H/O echocardiogram 01/2019    EF 60%  Mild LVH  trivial effusion      Hyperlipidemia     Hypertension     Migraines     Psychiatric disorder     anxiety    Uncontrolled hypertension 2015    Last Assessment & Plan:  BP today above goal <140/90, apparently asymptomatic  Prior BP elevations were attributed to not taking medication  Consider increased medication if persistent on f/u  Past Surgical History:   Procedure Laterality Date    BACK SURGERY      Lumbar epidural steroid injection    CARDIAC CATHETERIZATION N/A 10/22/2021    Procedure: Cardiac pci;  Surgeon: Manpreet Quinones MD;  [facility]; Service: Cardiology    CARDIAC CATHETERIZATION  10/22/2021    Procedure: Cardiac catheterization;  Surgeon: Manpreet Quinones MD;  [facility]; Service: Cardiology    CARDIAC CATHETERIZATION Left 10/27/2021    Procedure: Cardiac Left Heart Cath;  Surgeon: Constantino Fallon MD;  [facility]; Service: Cardiology    CARDIAC CATHETERIZATION N/A 10/27/2021    Procedure: Cardiac pci;  Surgeon: Constantino Fallon MD;  [facility]; Service: Cardiology    CARDIAC CATHETERIZATION N/A 10/28/2021    Procedure: Cardiac pci;  Surgeon: Suzette Worthington DO;  [facility]; Service: Cardiology    CARPAL TUNNEL RELEASE Left      SECTION      CHOLECYSTECTOMY      COLONOSCOPY      incomplete    COLONOSCOPY      EYE SURGERY      HYSTERECTOMY      Total    OVARIAN CYST REMOVAL      PEG W/TRACHEOSTOMY PLACEMENT N/A 2021    Procedure: TRACHEOSTOMY WITH INSERTION PEG TUBE;  Surgeon: Manas Fung DO;  Location: [facility] MAIN OR;  Service: General    TUBAL LIGATION      UPPER GASTROINTESTINAL ENDOSCOPY         Meds/Allergies:  Prior to Admission medications    Medication Sig Start Date End Date Taking?  Authorizing Provider   acetaminophen (TYLENOL) 160 mg/5 mL suspension 30 4 mL (975 mg total) by Per G Tube route every 6 (six) hours as needed for mild pain or fever 21   El Stockton PA-C   albuterol (2 5 mg/3 mL) 0 083 % nebulizer solution Take 3 mL (2 5 mg total) by nebulization every 4 (four) hours as needed for wheezing or shortness of breath 4/7/22   Freda Crowley MD   amiodarone 100 mg tablet TAKE 1 TABLET BY MOUTH DAILY WITH BREAKFAST  6/20/22   Freda Crowley MD   apixaban (ELIQUIS) 5 mg Take 1 tablet (5 mg total) by mouth 2 (two) times a day 3/21/22   Freda Crowley MD   atorvastatin (LIPITOR) 40 mg tablet Take 1 tablet (40 mg total) by mouth daily 3/21/22   Freda Crowley MD   Benzocaine-Menthol 15-2 6 MG LOZG Apply 1 lozenge to the mouth or throat 4 (four) times a day as needed (sore throat)  Patient not taking: No sig reported 5/14/22   Shila Robles MD   Blood Pressure Monitoring (Sphygmomanometer) MISC Use 2 (two) times a day  Patient not taking: No sig reported 4/30/21   Govind Martin MD   Brilinta 90 MG TAKE 1 TABLET BY MOUTH EVERY 12 HOURS  6/20/22   Freda Crowley MD   bumetanide (BUMEX) 2 mg tablet Take 1 tablet (2 mg total) by mouth 2 (two) times a day 7/5/22   NEELAM Castrejon   chlorhexidine (PERIDEX) 0 12 % solution Apply 15 mL to the mouth or throat every 12 (twelve) hours 11/23/21   Bella Dey PA-C   escitalopram (LEXAPRO) 10 mg tablet TAKE 1 TABLET BY MOUTH EVERY DAY 5/16/22   Freda Crowley MD   famotidine (PEPCID) 20 mg/2 5 mL oral suspension Take 2 5 mL (20 mg total) by mouth 2 (two) times a day 11/23/21   Bella Mercado PA-C   insulin aspart (NovoLOG FlexPen) 100 UNIT/ML injection pen Inject 4 Units under the skin 3 (three) times a day with meals 6/29/22   Keli Robbins MD   insulin glargine (Basaglar KwikPen) 100 units/mL injection pen Inject 16 Units under the skin daily 6/29/22   Keli Robbins MD   Insulin Pen Needle 31G X 6 MM MISC 1 Device by Does not apply route 4 (four) times a day 6/5/17   Historical Provider, MD   ipratropium (ATROVENT) 0 02 % nebulizer solution Take 2 5 mL (0 5 mg total) by nebulization 3 (three) times a day 11/23/21   Lala Luna PA-C   Lancets [facility] Use 1 Device 4 (four) times a day    Historical Provider, MD   levalbuterol (XOPENEX) 1 25 mg/0 5 mL nebulizer solution Take 0 5 mL (1 25 mg total) by nebulization 3 (three) times a day 11/23/21   Bella Mercado PA-C   LORazepam (Ativan) 1 mg tablet Take 0 5 tablets (0 5 mg total) by mouth every 8 (eight) hours as needed for anxiety or sleep 6/20/22   Stepan Castañeda MD   losartan (COZAAR) 50 mg tablet Take 1 tablet (50 mg total) by mouth every 12 (twelve) hours 6/6/22   Wanda NEELAM Zamora   magnesium Oxide (MAG-OX) 400 mg TABS TAKE 1 TABLET (400 MG TOTAL) BY MOUTH 2 TIMES A DAY 6/8/22   Historical Provider, MD   melatonin 3 mg Take 2 tablets (6 mg total) by mouth daily at bedtime  Patient not taking: No sig reported 3/15/22   Stepan Castañeda MD   metoprolol succinate (TOPROL-XL) 50 mg 24 hr tablet Take 1 tablet (50 mg total) by mouth every 12 (twelve) hours 6/6/22   Wanda NEELAM Zamora   Novofine Pen Needle 32G X 6 MM MISC USE 3 (THREE) TIMES A DAY WITH MEALS 7/5/22   Stepan Castañeda MD   pantoprazole (PROTONIX) 40 mg tablet Take 1 tablet (40 mg total) by mouth daily 6/21/22   Yana Castorena MD   polyethylene glycol (MIRALAX) 17 g packet Take 17 g by mouth daily  Patient not taking: No sig reported 11/23/21   Norman Cabrera PA-C   potassium chloride 40 MEQ/15ML (20%) oral solution Take 15 mL (40 mEq total) by mouth 2 (two) times a day 7/5/22   Wanda NEELAM Zamora   pregabalin (LYRICA) 75 mg capsule TAKE ONE CAPSULE EVERY DAY 1/29/21   Patrick Talley MD   senna-docusate sodium (SENOKOT S) 8 6-50 mg per tablet Take 1 tablet by mouth daily at bedtime  Patient not taking: No sig reported 11/23/21   Norman Cabrera PA-C   spironolactone (ALDACTONE) 100 mg tablet TAKE 1 TABLET BY MOUTH EVERY DAY 6/20/22   Stepan Castañeda MD   traZODone (DESYREL) 50 mg tablet TAKE 0 5 TABLET (25MG) BY MOUTH NIGHTLY AS NEEDED FOR SLEEP  Patient not taking: No sig reported 2/10/22   Historical Provider, MD     I have reviewed home medications with patient personally      Allergies: Allergies   Allergen Reactions    Metformin Diarrhea    Clonidine Rash     Patch        Social History:  Marital Status: Legally    Occupation:   Patient Pre-hospital Living Situation: Home  Patient Pre-hospital Level of Mobility: walks  Patient Pre-hospital Diet Restrictions:   Substance Use History:   Social History     Substance and Sexual Activity   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  Social History     Tobacco Use   Smoking Status Former Smoker    Packs/day: 1 00    Years: 30 00    Pack years: 30 00    Types: Cigarettes   Smokeless Tobacco Never Used     Social History     Substance and Sexual Activity   Drug Use Yes    Types: Marijuana    Comment: medical; seldom use       Family History:  Family History   Problem Relation Age of Onset    Hypertension Mother    [facility] Arthritis Mother     Diabetes Father     Other Sister         renal cell carcinoma    Diabetes Other     Factor V Leiden deficiency Other        Physical Exam:     Vitals:   Blood Pressure: 166/97 (07/15/22 0627)  Pulse: 72 (07/15/22 0627)  Temperature: 98 5 °F (36 9 °C) (07/15/22 0627)  Temp Source: Oral (07/15/22 0627)  Respirations: 22 (07/15/22 0627)  Height: 5' 9" (175 3 cm) (07/15/22 2932)  Weight - Scale: 80 1 kg (176 lb 9 6 oz) (07/15/22 0625)  SpO2: 100 % (07/15/22 0395)    Physical Exam  Vitals reviewed  Constitutional:       General: She is not in acute distress  Appearance: She is not toxic-appearing  HENT:      Head: Normocephalic and atraumatic  Mouth/Throat:      Mouth: Mucous membranes are moist    Eyes:      Pupils: Pupils are equal, round, and reactive to light  Neck:      Comments: tracheostomy insitu  Cardiovascular:      Rate and Rhythm: Normal rate and regular rhythm  Pulses: Normal pulses  Heart sounds: Normal heart sounds  No murmur heard  Pulmonary:      Effort: Pulmonary effort is normal  No respiratory distress        Breath sounds: Rales (b/l bases) present  No wheezing  Chest:      Chest wall: No tenderness  Abdominal:      General: Abdomen is flat  Bowel sounds are normal       Palpations: Abdomen is soft  Tenderness: There is no abdominal tenderness  There is no guarding  Musculoskeletal:      Right lower leg: Edema (trace) present  Left lower leg: Edema (trace) present  Skin:     General: Skin is warm and dry  Capillary Refill: Capillary refill takes less than 2 seconds  Neurological:      General: No focal deficit present  Mental Status: She is alert and oriented to person, place, and time  Cranial Nerves: No cranial nerve deficit  Psychiatric:         Mood and Affect: Mood normal          Behavior: Behavior normal           Additional Data:   Lab Results:  Results from last 7 days   Lab Units 07/15/22  0334   WBC Thousand/uL 11 18*   HEMOGLOBIN g/dL 10 1*   HEMATOCRIT % 33 6*   PLATELETS Thousands/uL 613*   NEUTROS PCT % 72   LYMPHS PCT % 16   MONOS PCT % 6   EOS PCT % 4     Results from last 7 days   Lab Units 07/15/22  0334   SODIUM mmol/L 134*   POTASSIUM mmol/L 3 5   CHLORIDE mmol/L 94*   CO2 mmol/L 30   BUN mg/dL 12   CREATININE mg/dL 0 83   ANION GAP mmol/L 10   CALCIUM mg/dL 9 1   ALBUMIN g/dL 3 5   TOTAL BILIRUBIN mg/dL 0 51   ALK PHOS U/L 95   ALT U/L 3*   AST U/L 4*   GLUCOSE RANDOM mg/dL 452*         Results from last 7 days   Lab Units 07/15/22  0638   POC GLUCOSE mg/dl 456*               Imaging: Reviewed radiology reports from this admission including: chest xray  XR chest 1 view portable    (Results Pending)       EKG and Other Studies Reviewed on Admission:   · EKG: NSR no acute ischemic changes    ** Please Note: This note has been constructed using a voice recognition system   **

## 2022-07-15 NOTE — ASSESSMENT & PLAN NOTE
Wt Readings from Last 3 Encounters:   06/29/22 81 7 kg (180 lb 3 2 oz)   06/21/22 84 kg (185 lb 3 oz)   06/06/22 84 kg (185 lb 3 2 oz)     · Appears mildly volume overloaded with trace edema and rales at b/l bases  CXR appears with b/l pleural effusions  BNP pending  · Echo 2/2022: LVEF 50%, moderate hypokinesis of inferior and anterolateral wall  Normal diastolic function  · Diuretic: Bumex 2g b i d , Spironolactone 100mg  · Give 1 time dose of lasix 80mg  Plan to resume home bumex this evening if improved     · Daily weights, I&Os  · Na/fluid restriction

## 2022-07-15 NOTE — SPEECH THERAPY NOTE
Speech-Language Pathology Bedside Swallow Evaluation        Patient Name: Alisha Russell    FNEUT'Z Date: 7/15/2022     Problem List  Principal Problem:    Chest pain  Active Problems:    Anxiety    Hypertension    Type 2 diabetes mellitus treated with insulin ([facility] [address] )    History of Ventricular tachycardia (HCC)    A-fib (HCC)    Chronic hypoxemic respiratory failure (HCC)    Acute on chronic HFrEF (heart failure with reduced ejection fraction) (HCC)    Microcytic anemia    Shortness of breath         Past Medical History  Past Medical History:   Diagnosis Date    Anxiety     Aortic aneurysm (HCC)     Arthritis     Depression     Diabetes mellitus (HCC)     Fibromyalgia     GERD (gastroesophageal reflux disease)     GERD (gastroesophageal reflux disease)     H/O cardiovascular stress test 09/2018    no ischemia  EF 70%   H/O echocardiogram 01/2019    EF 60%  Mild LVH  trivial effusion   Hyperlipidemia     Hypertension     Migraines     Psychiatric disorder     anxiety    Uncontrolled hypertension 2/25/2015    Last Assessment & Plan:  BP today above goal <140/90, apparently asymptomatic  Prior BP elevations were attributed to not taking medication  Consider increased medication if persistent on f/u  Past Surgical History  Past Surgical History:   Procedure Laterality Date    BACK SURGERY      Lumbar epidural steroid injection    CARDIAC CATHETERIZATION N/A 10/22/2021    Procedure: Cardiac pci;  Surgeon: Shannan Okeefe MD;  [facility]; Service: Cardiology    CARDIAC CATHETERIZATION  10/22/2021    Procedure: Cardiac catheterization;  Surgeon: Shannan Okeefe MD;  [facility]ARD[facility]; Service: Cardiology    CARDIAC CATHETERIZATION Left 10/27/2021    Procedure: Cardiac Left Heart Cath;  Surgeon: Hardik Dobson MD;  [facility];   Service: Cardiology    CARDIAC CATHETERIZATION N/A 10/27/2021    Procedure: Cardiac pci;  Surgeon: Meena Braden Kingsley Alba MD;  [facility]; Service: Cardiology    CARDIAC CATHETERIZATION N/A 10/28/2021    Procedure: Cardiac pci;  Surgeon: Sebastian Jalloh DO;  [facility]ARD[facility]; Service: Cardiology    CARPAL TUNNEL RELEASE Left      SECTION      CHOLECYSTECTOMY      COLONOSCOPY      incomplete    COLONOSCOPY      EYE SURGERY      HYSTERECTOMY      Total    OVARIAN CYST REMOVAL      PEG W/TRACHEOSTOMY PLACEMENT N/A 2021    Procedure: TRACHEOSTOMY WITH INSERTION PEG TUBE;  Surgeon: Yo Fung DO;  Location: [facility] MAIN [facility];  Service: General    TUBAL LIGATION      UPPER GASTROINTESTINAL ENDOSCOPY         Summary    Pt presents with c/o odynophagia, ? Pharyngeal dysphagia, pt c/o food retention, appeared w/ effortful swallows, and pain w/ swallowing, but no overt s/s aspiration noted, no po residue in tracheal secretions  Recommendations:   Diet: regular diet and thin liquids   Meds: whole with liquid   Frequent Oral care: 2/day  Aspiration precautions   Other Recommendations/ considerations: VBS- already scheduled for 22 at [phone] at [address] [place] [address]  Current Medical Status  Pt is a 65 y o  female who presented to [address] [place] [address]  with chest pain  Pt presents with chest pain shortness is breath over the past day  Reports onset of chest pain at rest yesterday morning described as a squeezing type of pain  Associated with some shortness of breath  No diaphoresis, radiation down the arm, nausea/vomiting  Reports pain is different than her usual chest pain that she gets  Reports pain is now resolved  Also has complaints of shortness of breath that is made worse when laying flat  Also reports a nonproductive cough  She denies fever, chills, sick contacts, abdominal pain, nausea/vomiting/diarrhea, urinary complaints  Does report compliance with medications at home  Reports compliance with sodium restriction    Also noted to have hyperglycemia on admission  Reports compliance with insulin and did take her Lantus last evening  Pt recently saw ENT 6/21/22: pt noted to have diffuse edema affecting upper airway (epiglottis and VC); op speech tx was recommended for speech and swallowing, also Modified barium swallow study ordered  Past medical history:   Please see H&P for details    Special Studies:  CXR: 7/15/22 Interstitial edema with small effusions and bibasilar atelectasis  Social/Education/Vocational Hx:  Pt lives at home     Swallow Information   Current Risks for Dysphagia & Aspiration: trach  Current Symptoms/Concerns: c/o pain w/ swallowing and c/o food/liquids getting stuck  Current Diet: regular diet and thin liquids   Baseline Diet: regular diet and thin liquids  Takes pills- whole w/ water     Baseline Assessment   Behavior/Cognition: alert  Speech/Language Status: able to participate in conversation and able to follow commands- pantomime speech  Patient Positioning: upright in bed     Swallow Mechanism Exam   Facial: symmetrical  Labial: WFL  Lingual: WFL  Velum: unable to visualize  Mandible: adequate ROM  Dentition: adequate  Vocal quality:aphonic   Volitional Cough: strong/productive   Respiratory: trach collar    Consistencies Assessed and Performance   Consistencies Administered: thin liquids, nectar thick, puree and soft solids    Oral Stage: pt was able to bite fig bar, functional mastication/manipulation  Pt drank NTL and thin liquids by cup and straw w/ good oral control  No oral residue noted  Pharyngeal Stage: pt appeared hesitant to initiate swallow, which may be delayed  Laryngeal excursion appeared complete  Pt w/ facial grimacing w/ swallowing foods > liquids  Delayed cough noted x2, productive for small amount of yellow/greenish thick mucous; no p residue noted in tracheal secretions        Esophageal Concerns: none reported      Results Reviewed with: patient       April Hi Sheehan CCC-SLP  Speech Pathologist  [place] license # [id number] [facility] [id number]  [place] license # [id number]  Available via [employer]

## 2022-07-15 NOTE — ASSESSMENT & PLAN NOTE
· Hgb 11 18, MCV 77  · Last iron panel 10/21 revealing NERI  · Not currently taking iron supps  · Check iron panel

## 2022-07-15 NOTE — ASSESSMENT & PLAN NOTE
Lab Results   Component Value Date    HGBA1C 12 7 (H) 05/22/2022       No results for input(s): POCGLU in the last 72 hours  Blood Sugar Average: Last 72 hrs:     · Uncontrolled per A1c  Presented with hyperglycemia , fortunately non-acidotic, no AG, no DKA  · received 1L NS in ED  · Check BG now --> 456   Give 5 U regular insulin  · Home regimen: Recently updated with last admission to Lantus 16U hs, Humalog 4U t i d  w/ meals   · Continue home regimen, add SSI and acu-checks ac/hs  · Hypoglycemia protocol

## 2022-07-15 NOTE — RESPIRATORY THERAPY NOTE
Pt requesting a PRN breathing treatment  Pt's SPO2 was 100% on her baseline, with no respiratory distress noted  Breath sounds diminished  Pt given a prn treatment for SOB

## 2022-07-16 LAB
ALBUMIN SERPL BCP-MCNC: 2.8 G/DL (ref 3.5–5)
ANION GAP SERPL CALCULATED.3IONS-SCNC: 11 MMOL/L (ref 4–13)
BUN SERPL-MCNC: 17 MG/DL (ref 5–25)
CALCIUM ALBUM COR SERPL-MCNC: 9.5 MG/DL (ref 8.3–10.1)
CALCIUM SERPL-MCNC: 8.5 MG/DL (ref 8.4–10.2)
CHLORIDE SERPL-SCNC: 96 MMOL/L (ref 96–108)
CO2 SERPL-SCNC: 31 MMOL/L (ref 21–32)
CREAT SERPL-MCNC: 0.94 MG/DL (ref 0.6–1.3)
ERYTHROCYTE [DISTWIDTH] IN BLOOD BY AUTOMATED COUNT: 16.4 % (ref 11.6–15.1)
GFR SERPL CREATININE-BSD FRML MDRD: 68 ML/MIN/1.73SQ M
GLUCOSE SERPL-MCNC: 254 MG/DL (ref 65–140)
GLUCOSE SERPL-MCNC: 261 MG/DL (ref 65–140)
GLUCOSE SERPL-MCNC: 316 MG/DL (ref 65–140)
GLUCOSE SERPL-MCNC: 361 MG/DL (ref 65–140)
GLUCOSE SERPL-MCNC: 366 MG/DL (ref 65–140)
HCT VFR BLD AUTO: 30.5 % (ref 34.8–46.1)
HGB BLD-MCNC: 9.2 G/DL (ref 11.5–15.4)
MAGNESIUM SERPL-MCNC: 1.5 MG/DL (ref 1.9–2.7)
MCH RBC QN AUTO: 23.5 PG (ref 26.8–34.3)
MCHC RBC AUTO-ENTMCNC: 30.2 G/DL (ref 31.4–37.4)
MCV RBC AUTO: 78 FL (ref 82–98)
PHOSPHATE SERPL-MCNC: 4.2 MG/DL (ref 2.7–4.5)
PLATELET # BLD AUTO: 475 THOUSANDS/UL (ref 149–390)
PMV BLD AUTO: 9.7 FL (ref 8.9–12.7)
POTASSIUM SERPL-SCNC: 3.9 MMOL/L (ref 3.5–5.3)
RBC # BLD AUTO: 3.91 MILLION/UL (ref 3.81–5.12)
SODIUM SERPL-SCNC: 138 MMOL/L (ref 135–147)
WBC # BLD AUTO: 11.16 THOUSAND/UL (ref 4.31–10.16)

## 2022-07-16 PROCEDURE — 82948 REAGENT STRIP/BLOOD GLUCOSE: CPT

## 2022-07-16 PROCEDURE — 80069 RENAL FUNCTION PANEL: CPT | Performed by: STUDENT IN AN ORGANIZED HEALTH CARE EDUCATION/TRAINING PROGRAM

## 2022-07-16 PROCEDURE — 94640 AIRWAY INHALATION TREATMENT: CPT

## 2022-07-16 PROCEDURE — 85027 COMPLETE CBC AUTOMATED: CPT | Performed by: STUDENT IN AN ORGANIZED HEALTH CARE EDUCATION/TRAINING PROGRAM

## 2022-07-16 PROCEDURE — 83735 ASSAY OF MAGNESIUM: CPT | Performed by: STUDENT IN AN ORGANIZED HEALTH CARE EDUCATION/TRAINING PROGRAM

## 2022-07-16 PROCEDURE — 94760 N-INVAS EAR/PLS OXIMETRY 1: CPT

## 2022-07-16 PROCEDURE — 99232 SBSQ HOSP IP/OBS MODERATE 35: CPT | Performed by: PHYSICIAN ASSISTANT

## 2022-07-16 RX ORDER — POTASSIUM CHLORIDE 20MEQ/15ML
40 LIQUID (ML) ORAL DAILY
Status: DISCONTINUED | OUTPATIENT
Start: 2022-07-16 | End: 2022-07-19

## 2022-07-16 RX ORDER — FERROUS SULFATE 325(65) MG
325 TABLET ORAL
Status: DISCONTINUED | OUTPATIENT
Start: 2022-07-17 | End: 2022-07-19 | Stop reason: HOSPADM

## 2022-07-16 RX ORDER — MAGNESIUM SULFATE 1 G/100ML
1 INJECTION INTRAVENOUS ONCE
Status: COMPLETED | OUTPATIENT
Start: 2022-07-16 | End: 2022-07-16

## 2022-07-16 RX ORDER — INSULIN GLARGINE 100 [IU]/ML
20 INJECTION, SOLUTION SUBCUTANEOUS
Status: DISCONTINUED | OUTPATIENT
Start: 2022-07-16 | End: 2022-07-19

## 2022-07-16 RX ORDER — INSULIN LISPRO 100 [IU]/ML
9 INJECTION, SOLUTION INTRAVENOUS; SUBCUTANEOUS
Status: DISCONTINUED | OUTPATIENT
Start: 2022-07-16 | End: 2022-07-19 | Stop reason: HOSPADM

## 2022-07-16 RX ADMIN — IPRATROPIUM BROMIDE 0.5 MG: 0.5 SOLUTION RESPIRATORY (INHALATION) at 19:28

## 2022-07-16 RX ADMIN — LOSARTAN POTASSIUM 50 MG: 50 TABLET, FILM COATED ORAL at 17:48

## 2022-07-16 RX ADMIN — INSULIN LISPRO 9 UNITS: 100 INJECTION, SOLUTION INTRAVENOUS; SUBCUTANEOUS at 16:33

## 2022-07-16 RX ADMIN — GUAIFENESIN 1200 MG: 600 TABLET ORAL at 08:30

## 2022-07-16 RX ADMIN — METOPROLOL SUCCINATE 50 MG: 50 TABLET, EXTENDED RELEASE ORAL at 05:27

## 2022-07-16 RX ADMIN — BUMETANIDE 2 MG: 1 TABLET ORAL at 17:46

## 2022-07-16 RX ADMIN — TICAGRELOR 90 MG: 90 TABLET ORAL at 17:47

## 2022-07-16 RX ADMIN — INSULIN LISPRO 6 UNITS: 100 INJECTION, SOLUTION INTRAVENOUS; SUBCUTANEOUS at 16:33

## 2022-07-16 RX ADMIN — PANTOPRAZOLE SODIUM 40 MG: 40 TABLET, DELAYED RELEASE ORAL at 05:27

## 2022-07-16 RX ADMIN — BUMETANIDE 2 MG: 1 TABLET ORAL at 08:29

## 2022-07-16 RX ADMIN — INSULIN LISPRO 3 UNITS: 100 INJECTION, SOLUTION INTRAVENOUS; SUBCUTANEOUS at 22:16

## 2022-07-16 RX ADMIN — LEVALBUTEROL HYDROCHLORIDE 1.25 MG: 1.25 SOLUTION RESPIRATORY (INHALATION) at 07:31

## 2022-07-16 RX ADMIN — INSULIN GLARGINE 20 UNITS: 100 INJECTION, SOLUTION SUBCUTANEOUS at 22:15

## 2022-07-16 RX ADMIN — INSULIN LISPRO 7 UNITS: 100 INJECTION, SOLUTION INTRAVENOUS; SUBCUTANEOUS at 11:30

## 2022-07-16 RX ADMIN — IPRATROPIUM BROMIDE 0.5 MG: 0.5 SOLUTION RESPIRATORY (INHALATION) at 07:31

## 2022-07-16 RX ADMIN — POTASSIUM CHLORIDE 40 MEQ: 20 SOLUTION ORAL at 14:43

## 2022-07-16 RX ADMIN — AMIODARONE HYDROCHLORIDE 100 MG: 200 TABLET ORAL at 08:31

## 2022-07-16 RX ADMIN — ATORVASTATIN CALCIUM 40 MG: 40 TABLET, FILM COATED ORAL at 15:34

## 2022-07-16 RX ADMIN — LEVALBUTEROL HYDROCHLORIDE 1.25 MG: 1.25 SOLUTION RESPIRATORY (INHALATION) at 19:28

## 2022-07-16 RX ADMIN — PREGABALIN 75 MG: 75 CAPSULE ORAL at 08:31

## 2022-07-16 RX ADMIN — LOSARTAN POTASSIUM 50 MG: 50 TABLET, FILM COATED ORAL at 05:26

## 2022-07-16 RX ADMIN — LEVALBUTEROL HYDROCHLORIDE 1.25 MG: 1.25 SOLUTION RESPIRATORY (INHALATION) at 14:30

## 2022-07-16 RX ADMIN — APIXABAN 5 MG: 5 TABLET, FILM COATED ORAL at 17:47

## 2022-07-16 RX ADMIN — APIXABAN 5 MG: 5 TABLET, FILM COATED ORAL at 08:32

## 2022-07-16 RX ADMIN — MAGNESIUM SULFATE HEPTAHYDRATE 1 G: 1 INJECTION, SOLUTION INTRAVENOUS at 08:29

## 2022-07-16 RX ADMIN — INSULIN LISPRO 7 UNITS: 100 INJECTION, SOLUTION INTRAVENOUS; SUBCUTANEOUS at 08:35

## 2022-07-16 RX ADMIN — SPIRONOLACTONE 100 MG: 25 TABLET ORAL at 08:31

## 2022-07-16 RX ADMIN — INSULIN LISPRO 6 UNITS: 100 INJECTION, SOLUTION INTRAVENOUS; SUBCUTANEOUS at 11:30

## 2022-07-16 RX ADMIN — IPRATROPIUM BROMIDE 0.5 MG: 0.5 SOLUTION RESPIRATORY (INHALATION) at 14:30

## 2022-07-16 RX ADMIN — INSULIN LISPRO 5 UNITS: 100 INJECTION, SOLUTION INTRAVENOUS; SUBCUTANEOUS at 08:29

## 2022-07-16 RX ADMIN — ESCITALOPRAM OXALATE 10 MG: 10 TABLET ORAL at 08:37

## 2022-07-16 RX ADMIN — TICAGRELOR 90 MG: 90 TABLET ORAL at 05:27

## 2022-07-16 RX ADMIN — GUAIFENESIN 1200 MG: 600 TABLET ORAL at 22:15

## 2022-07-16 RX ADMIN — METOPROLOL SUCCINATE 50 MG: 50 TABLET, EXTENDED RELEASE ORAL at 17:47

## 2022-07-16 RX ADMIN — ACETAMINOPHEN 650 MG: 325 TABLET, FILM COATED ORAL at 15:34

## 2022-07-16 RX ADMIN — LORAZEPAM 0.5 MG: 0.5 TABLET ORAL at 23:46

## 2022-07-16 NOTE — PROGRESS NOTES
[address]  Progress Note - Dick Ruiz 1965, 65 y o  female MRN: [id number]  Unit/Bed#: -01 Encounter: [id number]  Primary Care Provider: Bishop Shane MD   Date and time admitted to hospital: 7/15/2022  2:23 AM      DOS: 7/16/2022  * Chest pain  Assessment & Plan  · Pt presented with squeezing centralized chest pain at rest  Now resolved  Some associated SOB which is also improved   · Likely noncardiac  · Had cardiac cath in 2019 with 90% occlusion of mid cx s/p PATRICA  · Recent echo from 2/2022 reviewed   · RALPH 3  · Troponins x 3 negative  · EKG negative for ischemia  · Telemetry in place, pt with lifevest   · Follows with cardiology as an outpatient, recommend close follow up once medically stable for discharge   · If chest pain comes back or worsens would obtain inpatient cardiology consultation due to extensive history while inpatient     Hypomagnesemia  Assessment & Plan  · Mag 1 5 today   · Replete with 1 g IV mag sulfate   · Obtain repeat mag level in the AM    Shortness of breath  Assessment & Plan  · With multiple ED visits and admissions for same   Again with usual presentation presenting for suctioning  · Multiple contributing factors including known medical non-compliance, HFrEF, severe anxiety, chronic hypoxic respiratory failure with tracheostomy, CAD  · Suspect mild decompensated HF which is now resolved   · CXR with interstitial edema with small effusions and atelectasis, BNP 400s   · S/p 1 x dose of IV lasix, no indication for additional IV diuretics at this time  · Continue pt's home Bumex 2 mg BID  · Trach care, suctioning, supp O2   · Fluid restriction, I&Os, daily weights   · Mild leukocytosis noted on CBC, no acute infectious process noted, pt is non-toxic appearing and hemodynamically stable      Microcytic anemia  Assessment & Plan  · Hgb 9 2, microcytic  · Baseline appears to be 9-10 on review   · Iron panel here with iron stores of 29, compatible with NERI  · Start on PO ferrous sulfate daily   · Trend hgb    Acute on chronic HFrEF (heart failure with reduced ejection fraction) (HCC)  Assessment & Plan  Wt Readings from Last 3 Encounters:   07/15/22 80 1 kg (176 lb 9 6 oz)   06/29/22 81 7 kg (180 lb 3 2 oz)   06/21/22 84 kg (185 lb 3 oz)     · Noted to have very mild volume overload on admission with trace edema, rales on exam, given 1 x dose of IV lasix 80 mg   · CXR with interstitial edema and small effusions  · Now appears to be euvolemic   · BNP only 472  · Echo 2/2022: LVEF 50%, moderate hypokinesis of inferior and anterolateral wall  Normal diastolic function  · Diuretic: Bumex 2g b i d , Spironolactone 100mg daily, continue  · Daily weights, I&Os  · Na/fluid restriction   · Resume potassium supplementation at decreased 40 mEq daily, titrate to BID dosing (home dose) as indicated        Chronic hypoxemic respiratory failure (HCC)  Assessment & Plan  · W/ tracheostomy   Baseline FiO2 35%  · Trach care  · Continue atrovent, xopenex, p r n  albuterol  · Currently at baseline    A-fib [facility])  Assessment & Plan  · NSR on admission   · Currently on amiodarone 100 mg daily and Toprol XL 50 mg BID for rate control   · Continue Eliquis 5 mg BID for a/c  · Also maintained on Brilinta 90 mg BID    History of Ventricular tachycardia (HCC)  Assessment & Plan  · W/ LifeVest in place  · Continue BB, Brilinta, amiodarone and Eliquis  · Follows with cardiology as an outpatient, last outpatient appointment in the beginning of June of this year   · Pt advised to follow up with EP for possible ICD placement  · Continue telemetry monitoring for now     Type 2 diabetes mellitus treated with insulin [facility])  Assessment & Plan  Lab Results   Component Value Date    HGBA1C 12 7 (H) 05/22/2022       Recent Labs     07/15/22  1522 07/15/22  2106 07/16/22  0751 07/16/22  1119   POCGLU 119 290* 316* 366*       Blood Sugar Average: Last 72 hrs:  (P) 038 [id number]   · Uncontrolled per A1c  Presented with hyperglycemia , fortunately non-acidotic, no AG, no DKA  · Home regimen: Recently updated with last admission to Lantus 16U hs, Humalog 4U t i d  w/ meals   · However continues to have hyperglycemia here, last glucose 366   · Will increase Lantus to 20 units QHS and humalog 9 units TID with meals with SSI coverage  · QID glucose checks   · Hypoglycemia protocol  · Consistent carb diet   · Monitor and adjust regimen as needed     Hypertension  Assessment & Plan  · BP appears stable on review   · Continue losartan 50 mg BID, Toprol XL 50 mg BID, Bumex 2 mg BID and Spironolactone 100 mg daily   · Monitor     Anxiety  Assessment & Plan  · Continue home ativan p r n  and lexapro 10 mg daily   · Mood appears stable on evaluation today       VTE Pharmacologic Prophylaxis: VTE Score: 3 Moderate Risk (Score 3-4) - Pharmacological DVT Prophylaxis Ordered: apixaban (Eliquis)  Patient Centered Rounds: I evaluated the patient without nursing staff present due to speaking to nurse outside patient's room   Discussions with Specialists or Other Care Team Provider: Discussed with RN, TARYN attending and reviewed previous notes     Education and Discussions with Family / Patient: Patient declined call to   Time Spent for Care: 20 minutes  More than 50% of total time spent on counseling and coordination of care as described above  Current Length of Stay: 1 day(s)  Current Patient Status: Inpatient   Certification Statement: The patient will continue to require additional inpatient hospital stay due to adjustment of insulin, monitoring glucose and improvement of electorlytes   Discharge Plan: Anticipate discharge tomorrow to home  Code Status: Level 1 - Full Code    Subjective:   Pt reports that overall she is feeling better  Chest pain and shortness of breath have significantly improved  Appetite is good, she has no additional complaints at this time   Reports that her lower extremity edema has resolved  Objective:     Vitals:   Temp (24hrs), Av 4 °F (36 3 °C), Min:96 2 °F (35 7 °C), Max:98 4 °F (36 9 °C)    Temp:  [96 2 °F (35 7 °C)-98 4 °F (36 9 °C)] 96 2 °F (35 7 °C)  HR:  [58-80] 58  Resp:  [17-19] 18  BP: (127-155)/(77-92) 134/83  SpO2:  [90 %-100 %] 100 %  Body mass index is 26 08 kg/m²  Input and Output Summary (last 24 hours): Intake/Output Summary (Last 24 hours) at 2022 1400  Last data filed at 2022 0730  Gross per 24 hour   Intake 240 ml   Output 1200 ml   Net -960 ml       Physical Exam:   Physical Exam  Vitals reviewed  Constitutional:       General: She is not in acute distress  Appearance: She is not toxic-appearing  Comments: Pt is in no acute distress lying in her hospital bed resting comfortably  Trach in place  HENT:      Head: Normocephalic and atraumatic  Cardiovascular:      Rate and Rhythm: Normal rate and regular rhythm  Pulses: Normal pulses  Pulmonary:      Effort: Pulmonary effort is normal  No respiratory distress  Comments: Coarse breath sounds bilaterally   Abdominal:      General: Bowel sounds are normal  There is no distension  Palpations: Abdomen is soft  Tenderness: There is no abdominal tenderness  Musculoskeletal:      Right lower leg: No edema  Left lower leg: No edema  Skin:     General: Skin is warm and dry  Findings: No erythema  Neurological:      Mental Status: She is alert     Psychiatric:         Mood and Affect: Mood normal           Additional Data:     Labs:  Results from last 7 days   Lab Units 22  0444 07/15/22  0334   WBC Thousand/uL 11 16* 11 18*   HEMOGLOBIN g/dL 9 2* 10 1*   HEMATOCRIT % 30 5* 33 6*   PLATELETS Thousands/uL 475* 613*   NEUTROS PCT %  --  72   LYMPHS PCT %  --  16   MONOS PCT %  --  6   EOS PCT %  --  4     Results from last 7 days   Lab Units 22  0444 07/15/22  0334   SODIUM mmol/L 138 134*   POTASSIUM mmol/L 3 9 3 5   CHLORIDE mmol/L 96 94*   CO2 mmol/L 31 30   BUN mg/dL 17 12   CREATININE mg/dL 0 94 0 83   ANION GAP mmol/L 11 10   CALCIUM mg/dL 8 5 9 1   ALBUMIN g/dL 2 8* 3 5   TOTAL BILIRUBIN mg/dL  --  0 51   ALK PHOS U/L  --  95   ALT U/L  --  3*   AST U/L  --  4*   GLUCOSE RANDOM mg/dL 254* 452*         Results from last 7 days   Lab Units 07/16/22  1119 07/16/22  0751 07/15/22  2106 07/15/22  1522 07/15/22  1115 07/15/22  0638   POC GLUCOSE mg/dl 366* 316* 290* 119 297* 456*               Lines/Drains:  Invasive Devices  Report    Peripheral Intravenous Line  Duration           Peripheral IV 07/15/22 Left Antecubital 1 day          Airway  Duration           Surgical Airway Shiley Fenestrated 136 days                  Telemetry:  Telemetry Orders (From admission, onward)             48 Hour Telemetry Monitoring  Continuous x 48 hours        References:    Telemetry Guidelines   Question:  Reason for 48 Hour Telemetry  Answer:  Acute MI, chest pain - R/O MI, or unstable angina                 Telemetry Reviewed: Normal Sinus Rhythm  Indication for Continued Telemetry Use: Lifevest (remains on tele entire hospital stay)             Imaging: Reviewed radiology reports from this admission including: chest xray    Recent Cultures (last 7 days):         Last 24 Hours Medication List:   Current Facility-Administered Medications   Medication Dose Route Frequency Provider Last Rate    acetaminophen  650 mg Oral Q6H PRN Mayi Zheng DO      albuterol  2 5 mg Nebulization Q4H PRN Leda Smith PA-C      amiodarone  100 mg Oral Daily With Breakfast Leda Smith PA-C      apixaban  5 mg Oral BID Leda Smith PA-C      atorvastatin  40 mg Oral Daily With Dinner Leda Smith PA-C      bumetanide  2 mg Oral BID Leda Smith PA-C      escitalopram  10 mg Oral Daily Leda Smith PA-C      [START ON 7/17/2022] ferrous sulfate  325 mg Oral Daily With Breakfast Masha Teran PA-C  guaiFENesin  1,200 mg Oral Q12H [facility] Mayi Zheng, DO      insulin glargine  20 Units Subcutaneous HS Ace Araceli, PA-GAIL      insulin lispro  1-6 Units Subcutaneous TID AC Leda Mahmood, PA-C      insulin lispro  1-6 Units Subcutaneous HS Leda Mahmood, PA-C      insulin lispro  9 Units Subcutaneous TID With Meals Ace Araceli, PA-C      ipratropium  0 5 mg Nebulization TID Yanni Almonte, PA-C      levalbuterol  1 25 mg Nebulization TID Mayi Zheng, DO      LORazepam  0 5 mg Oral Q8H PRN Leda Mahmood, DONALD      losartan  50 mg Oral Q12H Leda Mahmood, DONALD      metoprolol succinate  50 mg Oral Q12H Leda Mahmood, PA-GAIL      pantoprazole  40 mg Oral Early Morning Leda Mahmood, PA-GAIL      potassium chloride  40 mEq Oral Daily Ace Araceli, PA-C      pregabalin  75 mg Oral Daily Leda Mahmood, DONALD      spironolactone  100 mg Oral Daily Leda Mahmood, DONALD      ticagrelor  90 mg Oral Q12H Leda Mahmood, DONALD          Today, Patient Was Seen By: Jame Fitzgerald PA-C    **Please Note: This note may have been constructed using a voice recognition system  **

## 2022-07-16 NOTE — ASSESSMENT & PLAN NOTE
· NSR on admission   · Currently on amiodarone 100 mg daily and Toprol XL 50 mg BID for rate control   · Continue Eliquis 5 mg BID for a/c  · Also maintained on Brilinta 90 mg BID

## 2022-07-16 NOTE — ASSESSMENT & PLAN NOTE
· Pt presented with squeezing centralized chest pain at rest  Now resolved   Some associated SOB which is also improved   · Likely noncardiac  · Had cardiac cath in 2019 with 90% occlusion of mid cx s/p PATRICA  · Recent echo from 2/2022 reviewed   · RALPH 3  · Troponins x 3 negative  · EKG negative for ischemia  · Telemetry in place, pt with lifevest   · Follows with cardiology as an outpatient, recommend close follow up once medically stable for discharge   · If chest pain comes back or worsens would obtain inpatient cardiology consultation due to extensive history while inpatient

## 2022-07-16 NOTE — ASSESSMENT & PLAN NOTE
· With multiple ED visits and admissions for same   Again with usual presentation presenting for suctioning  · Multiple contributing factors including known medical non-compliance, HFrEF, severe anxiety, chronic hypoxic respiratory failure with tracheostomy, CAD  · Suspect mild decompensated HF which is now resolved   · CXR negative, BNP 400s   · S/p 1 x dose of IV lasix, no indication for additional IV diuretics at this time  · Continue pt's home Bumex 2 mg BID  · Trach care, suctioning, supp O2   · Fluid restriction, I&Os, daily weights   · Mild leukocytosis noted on CBC, no acute infectious process noted, pt is non-toxic appearing and hemodynamically stable

## 2022-07-16 NOTE — ASSESSMENT & PLAN NOTE
· W/ tracheostomy   Baseline FiO2 35%  · Trach care  · Continue atrovent, xopenex, p r n  albuterol  · Currently at baseline

## 2022-07-16 NOTE — ASSESSMENT & PLAN NOTE
· W/ LifeVest in place  · Continue BB, Brilinta, amiodarone and Eliquis  · Follows with cardiology as an outpatient, last outpatient appointment in the beginning of June of this year   · Pt advised to follow up with EP for possible ICD placement  · Continue telemetry monitoring for now

## 2022-07-16 NOTE — PLAN OF CARE
Problem: Potential for Falls  Goal: Patient will remain free of falls  Description: INTERVENTIONS:  - Educate patient/family on patient safety including physical limitations  - Instruct patient to call for assistance with activity   - Consult OT/PT to assist with strengthening/mobility   - Keep Call bell within reach  - Keep bed low and locked with side rails adjusted as appropriate  - Keep care items and personal belongings within reach  - Initiate and maintain comfort rounds  - Make Fall Risk Sign visible to staff  - Offer Toileting every 2 Hours, in advance of need  - Initiate/Maintain  bed and chair alarm  - Apply yellow socks and bracelet for high fall risk patients  - Consider moving patient to room near nurses station  Outcome: Progressing     Problem: MOBILITY - ADULT  Goal: Maintain or return to baseline ADL function  Description: INTERVENTIONS:  -  Assess patient's ability to carry out ADLs; assess patient's baseline for ADL function and identify physical deficits which impact ability to perform ADLs (bathing, care of mouth/teeth, toileting, grooming, dressing, etc )  - Assess/evaluate cause of self-care deficits   - Assess range of motion  - Assess patient's mobility; develop plan if impaired  - Assess patient's need for assistive devices and provide as appropriate  - Encourage maximum independence but intervene and supervise when necessary  - Involve family in performance of ADLs  - Assess for home care needs following discharge   - Consider OT consult to assist with ADL evaluation and planning for discharge  - Provide patient education as appropriate  Outcome: Progressing  Goal: Maintains/Returns to pre admission functional level  Description: INTERVENTIONS:  - Perform BMAT or MOVE assessment daily    - Set and communicate daily mobility goal to care team and patient/family/caregiver     - Collaborate with rehabilitation services on mobility goals if consulted  - Perform Range of Motion 3  times a day   - Stand patient 3  times a day  - Ambulate patient 3  times a day  - Out of bed to chair  3  times a day   - Out of bed for meals 3  times a day  - Out of bed for toileting  - Record patient progress and toleration of activity level   Outcome: Progressing     Problem: Prexisting or High Potential for Compromised Skin Integrity  Goal: Skin integrity is maintained or improved  Description: INTERVENTIONS:  - Identify patients at risk for skin breakdown  - Assess and monitor skin integrity  - Assess and monitor nutrition and hydration status  - Monitor labs   - Assess for incontinence   - Turn and reposition patient  - Assist with mobility/ambulation  - Relieve pressure over bony prominences  - Avoid friction and shearing  - Provide appropriate hygiene as needed including keeping skin clean and dry  - Evaluate need for skin moisturizer/barrier cream  - Collaborate with interdisciplinary team   - Patient/family teaching  Outcome: Progressing     Problem: Nutrition/Hydration-ADULT  Goal: Nutrient/Hydration intake appropriate for improving, restoring or maintaining nutritional needs  Description: Monitor and assess patient's nutrition/hydration status for malnutrition  Collaborate with interdisciplinary team and initiate plan and interventions as ordered  Monitor patient's weight and dietary intake as ordered or per policy  Utilize nutrition screening tool and intervene as necessary  Determine patient's food preferences and provide high-protein, high-caloric foods as appropriate       INTERVENTIONS:  - Monitor oral intake, urinary output, labs, and treatment plans  - Assess nutrition and hydration status and recommend course of action  - Evaluate amount of meals eaten  - Assist patient with eating if necessary   - Allow adequate time for meals  - Recommend/ encourage appropriate diets, oral nutritional supplements, and vitamin/mineral supplements  - Order, calculate, and assess calorie counts as needed  - Assess need for intravenous fluids  - Provide specific nutrition/hydration education as appropriate  - Include patient/family/caregiver in decisions related to nutrition  Outcome: Progressing     Problem: CARDIOVASCULAR - ADULT  Goal: Absence of cardiac dysrhythmias or at baseline rhythm  Description: INTERVENTIONS:  - Continuous cardiac monitoring, vital signs, obtain 12 lead EKG if ordered  - Administer antiarrhythmic and heart rate control medications as ordered  - Monitor electrolytes and administer replacement therapy as ordered  Outcome: Progressing     Problem: RESPIRATORY - ADULT  Goal: Achieves optimal ventilation and oxygenation  Description: INTERVENTIONS:  - Assess for changes in respiratory status  - Assess for changes in mentation and behavior  - Position to facilitate oxygenation and minimize respiratory effort  - Oxygen administered by appropriate delivery if ordered  - Initiate smoking cessation education as indicated  - Encourage broncho-pulmonary hygiene including cough, deep breathe, Incentive Spirometry  - Assess the need for suctioning and aspirate as needed  - Assess and instruct to report SOB or any respiratory difficulty  - Respiratory Therapy support as indicated  Outcome: Progressing     Problem: METABOLIC, FLUID AND ELECTROLYTES - ADULT  Goal: Glucose maintained within target range  Description: INTERVENTIONS:  - Monitor Blood Glucose as ordered  - Assess for signs and symptoms of hyperglycemia and hypoglycemia  - Administer ordered medications to maintain glucose within target range  - Assess nutritional intake and initiate nutrition service referral as needed  Outcome: Progressing

## 2022-07-16 NOTE — ASSESSMENT & PLAN NOTE
· Hgb 9 2, microcytic  · Baseline appears to be 9-10 on review   · Iron panel here with iron stores of 29, compatible with NERI  · Start on PO ferrous sulfate daily   · Trend hgb

## 2022-07-16 NOTE — ASSESSMENT & PLAN NOTE
Wt Readings from Last 3 Encounters:   07/15/22 80 1 kg (176 lb 9 6 oz)   06/29/22 81 7 kg (180 lb 3 2 oz)   06/21/22 84 kg (185 lb 3 oz)     · Noted to have very mild volume overload on admission with trace edema, rales on exam, given 1 x dose of IV lasix 80 mg   · Now appears to be euvolemic   · BNP only 472  · Echo 2/2022: LVEF 50%, moderate hypokinesis of inferior and anterolateral wall   Normal diastolic function  · Diuretic: Bumex 2g b i d , Spironolactone 100mg daily, continue  · Daily weights, I&Os  · Na/fluid restriction   · Resume potassium supplementation at decreased 40 mEq daily, titrate to BID dosing (home dose) as indicated

## 2022-07-16 NOTE — ASSESSMENT & PLAN NOTE
· BP appears stable on review   · Continue losartan 50 mg BID, Toprol XL 50 mg BID, Bumex 2 mg BID and Spironolactone 100 mg daily   · Monitor

## 2022-07-17 PROBLEM — R04.2 HEMOPTYSIS: Status: ACTIVE | Noted: 2022-07-17

## 2022-07-17 LAB
ALBUMIN SERPL BCP-MCNC: 3.1 G/DL (ref 3.5–5)
ANION GAP SERPL CALCULATED.3IONS-SCNC: 9 MMOL/L (ref 4–13)
BUN SERPL-MCNC: 21 MG/DL (ref 5–25)
CALCIUM ALBUM COR SERPL-MCNC: 9.7 MG/DL (ref 8.3–10.1)
CALCIUM SERPL-MCNC: 9 MG/DL (ref 8.4–10.2)
CHLORIDE SERPL-SCNC: 95 MMOL/L (ref 96–108)
CO2 SERPL-SCNC: 34 MMOL/L (ref 21–32)
CREAT SERPL-MCNC: 1.01 MG/DL (ref 0.6–1.3)
ERYTHROCYTE [DISTWIDTH] IN BLOOD BY AUTOMATED COUNT: 16.3 % (ref 11.6–15.1)
GFR SERPL CREATININE-BSD FRML MDRD: 62 ML/MIN/1.73SQ M
GLUCOSE SERPL-MCNC: 187 MG/DL (ref 65–140)
GLUCOSE SERPL-MCNC: 209 MG/DL (ref 65–140)
GLUCOSE SERPL-MCNC: 217 MG/DL (ref 65–140)
GLUCOSE SERPL-MCNC: 219 MG/DL (ref 65–140)
GLUCOSE SERPL-MCNC: 295 MG/DL (ref 65–140)
HCT VFR BLD AUTO: 32.5 % (ref 34.8–46.1)
HGB BLD-MCNC: 9.7 G/DL (ref 11.5–15.4)
MAGNESIUM SERPL-MCNC: 1.6 MG/DL (ref 1.9–2.7)
MCH RBC QN AUTO: 22.9 PG (ref 26.8–34.3)
MCHC RBC AUTO-ENTMCNC: 29.8 G/DL (ref 31.4–37.4)
MCV RBC AUTO: 77 FL (ref 82–98)
PHOSPHATE SERPL-MCNC: 3.9 MG/DL (ref 2.7–4.5)
PLATELET # BLD AUTO: 533 THOUSANDS/UL (ref 149–390)
PMV BLD AUTO: 9.6 FL (ref 8.9–12.7)
POTASSIUM SERPL-SCNC: 3.6 MMOL/L (ref 3.5–5.3)
RBC # BLD AUTO: 4.24 MILLION/UL (ref 3.81–5.12)
SODIUM SERPL-SCNC: 138 MMOL/L (ref 135–147)
WBC # BLD AUTO: 11.23 THOUSAND/UL (ref 4.31–10.16)

## 2022-07-17 PROCEDURE — 94760 N-INVAS EAR/PLS OXIMETRY 1: CPT

## 2022-07-17 PROCEDURE — 82948 REAGENT STRIP/BLOOD GLUCOSE: CPT

## 2022-07-17 PROCEDURE — 99232 SBSQ HOSP IP/OBS MODERATE 35: CPT | Performed by: PHYSICIAN ASSISTANT

## 2022-07-17 PROCEDURE — 83735 ASSAY OF MAGNESIUM: CPT | Performed by: STUDENT IN AN ORGANIZED HEALTH CARE EDUCATION/TRAINING PROGRAM

## 2022-07-17 PROCEDURE — 94640 AIRWAY INHALATION TREATMENT: CPT

## 2022-07-17 PROCEDURE — 80069 RENAL FUNCTION PANEL: CPT | Performed by: STUDENT IN AN ORGANIZED HEALTH CARE EDUCATION/TRAINING PROGRAM

## 2022-07-17 PROCEDURE — 85027 COMPLETE CBC AUTOMATED: CPT | Performed by: STUDENT IN AN ORGANIZED HEALTH CARE EDUCATION/TRAINING PROGRAM

## 2022-07-17 RX ORDER — GLYCOPYRROLATE 1 MG/1
1 TABLET ORAL ONCE
Status: COMPLETED | OUTPATIENT
Start: 2022-07-17 | End: 2022-07-17

## 2022-07-17 RX ORDER — MAGNESIUM SULFATE HEPTAHYDRATE 40 MG/ML
2 INJECTION, SOLUTION INTRAVENOUS ONCE
Status: COMPLETED | OUTPATIENT
Start: 2022-07-17 | End: 2022-07-17

## 2022-07-17 RX ADMIN — PREGABALIN 75 MG: 75 CAPSULE ORAL at 09:23

## 2022-07-17 RX ADMIN — METOPROLOL SUCCINATE 50 MG: 50 TABLET, EXTENDED RELEASE ORAL at 17:30

## 2022-07-17 RX ADMIN — INSULIN LISPRO 2 UNITS: 100 INJECTION, SOLUTION INTRAVENOUS; SUBCUTANEOUS at 16:24

## 2022-07-17 RX ADMIN — BUMETANIDE 2 MG: 1 TABLET ORAL at 09:23

## 2022-07-17 RX ADMIN — INSULIN LISPRO 9 UNITS: 100 INJECTION, SOLUTION INTRAVENOUS; SUBCUTANEOUS at 16:24

## 2022-07-17 RX ADMIN — PANTOPRAZOLE SODIUM 40 MG: 40 TABLET, DELAYED RELEASE ORAL at 06:57

## 2022-07-17 RX ADMIN — GUAIFENESIN 1200 MG: 600 TABLET ORAL at 09:23

## 2022-07-17 RX ADMIN — METOPROLOL SUCCINATE 50 MG: 50 TABLET, EXTENDED RELEASE ORAL at 06:57

## 2022-07-17 RX ADMIN — LOSARTAN POTASSIUM 50 MG: 50 TABLET, FILM COATED ORAL at 06:58

## 2022-07-17 RX ADMIN — LEVALBUTEROL HYDROCHLORIDE 1.25 MG: 1.25 SOLUTION RESPIRATORY (INHALATION) at 14:48

## 2022-07-17 RX ADMIN — MAGNESIUM SULFATE HEPTAHYDRATE 2 G: 40 INJECTION, SOLUTION INTRAVENOUS at 09:33

## 2022-07-17 RX ADMIN — IPRATROPIUM BROMIDE 0.5 MG: 0.5 SOLUTION RESPIRATORY (INHALATION) at 14:48

## 2022-07-17 RX ADMIN — ACETAMINOPHEN 650 MG: 325 TABLET, FILM COATED ORAL at 09:22

## 2022-07-17 RX ADMIN — BUMETANIDE 2 MG: 1 TABLET ORAL at 17:30

## 2022-07-17 RX ADMIN — GLYCOPYRROLATE 1 MG: 1 TABLET ORAL at 13:21

## 2022-07-17 RX ADMIN — AMIODARONE HYDROCHLORIDE 100 MG: 200 TABLET ORAL at 09:32

## 2022-07-17 RX ADMIN — LORAZEPAM 0.5 MG: 0.5 TABLET ORAL at 09:22

## 2022-07-17 RX ADMIN — IPRATROPIUM BROMIDE 0.5 MG: 0.5 SOLUTION RESPIRATORY (INHALATION) at 19:20

## 2022-07-17 RX ADMIN — APIXABAN 5 MG: 5 TABLET, FILM COATED ORAL at 09:23

## 2022-07-17 RX ADMIN — FERROUS SULFATE TAB 325 MG (65 MG ELEMENTAL FE) 325 MG: 325 (65 FE) TAB at 09:32

## 2022-07-17 RX ADMIN — ATORVASTATIN CALCIUM 40 MG: 40 TABLET, FILM COATED ORAL at 16:31

## 2022-07-17 RX ADMIN — TICAGRELOR 90 MG: 90 TABLET ORAL at 06:57

## 2022-07-17 RX ADMIN — INSULIN LISPRO 9 UNITS: 100 INJECTION, SOLUTION INTRAVENOUS; SUBCUTANEOUS at 12:24

## 2022-07-17 RX ADMIN — LEVALBUTEROL HYDROCHLORIDE 1.25 MG: 1.25 SOLUTION RESPIRATORY (INHALATION) at 07:23

## 2022-07-17 RX ADMIN — ACETAMINOPHEN 650 MG: 325 TABLET, FILM COATED ORAL at 22:53

## 2022-07-17 RX ADMIN — IPRATROPIUM BROMIDE 0.5 MG: 0.5 SOLUTION RESPIRATORY (INHALATION) at 07:23

## 2022-07-17 RX ADMIN — TICAGRELOR 90 MG: 90 TABLET ORAL at 17:30

## 2022-07-17 RX ADMIN — INSULIN LISPRO 2 UNITS: 100 INJECTION, SOLUTION INTRAVENOUS; SUBCUTANEOUS at 22:37

## 2022-07-17 RX ADMIN — INSULIN LISPRO 1 UNITS: 100 INJECTION, SOLUTION INTRAVENOUS; SUBCUTANEOUS at 09:33

## 2022-07-17 RX ADMIN — SPIRONOLACTONE 100 MG: 25 TABLET ORAL at 09:22

## 2022-07-17 RX ADMIN — LORAZEPAM 0.5 MG: 0.5 TABLET ORAL at 17:29

## 2022-07-17 RX ADMIN — LOSARTAN POTASSIUM 50 MG: 50 TABLET, FILM COATED ORAL at 17:29

## 2022-07-17 RX ADMIN — GUAIFENESIN 1200 MG: 600 TABLET ORAL at 21:55

## 2022-07-17 RX ADMIN — POTASSIUM CHLORIDE 40 MEQ: 20 SOLUTION ORAL at 09:24

## 2022-07-17 RX ADMIN — INSULIN GLARGINE 20 UNITS: 100 INJECTION, SOLUTION SUBCUTANEOUS at 22:37

## 2022-07-17 RX ADMIN — INSULIN LISPRO 9 UNITS: 100 INJECTION, SOLUTION INTRAVENOUS; SUBCUTANEOUS at 09:32

## 2022-07-17 RX ADMIN — ESCITALOPRAM OXALATE 10 MG: 10 TABLET ORAL at 09:23

## 2022-07-17 RX ADMIN — INSULIN LISPRO 4 UNITS: 100 INJECTION, SOLUTION INTRAVENOUS; SUBCUTANEOUS at 12:25

## 2022-07-17 RX ADMIN — LEVALBUTEROL HYDROCHLORIDE 1.25 MG: 1.25 SOLUTION RESPIRATORY (INHALATION) at 19:20

## 2022-07-17 NOTE — ASSESSMENT & PLAN NOTE
Lab Results   Component Value Date    HGBA1C 12 7 (H) 05/22/2022       Recent Labs     07/16/22  1119 07/16/22  1555 07/16/22 2057 07/17/22  0742   POCGLU 366* 361* 261* 187*       Blood Sugar Average: Last 72 hrs:  (P) 294 [id number]   · Uncontrolled per A1c   Presented with hyperglycemia , fortunately non-acidotic, no AG, no DKA  · Home regimen: Recently updated with last admission to Lantus 16U hs, Humalog 4U t i d  w/ meals   · However continues to have hyperglycemia here, last glucose 366   · Will increase Lantus to 20 units QHS and humalog 9 units TID with meals with SSI coverage  · QID glucose checks   · Hypoglycemia protocol  · Consistent carb diet   · Monitor and adjust regimen as needed

## 2022-07-17 NOTE — PROGRESS NOTES
Yared U  67   Progress Note - Suyapa Wyatt 1965, 65 y o  female MRN: [id number]  Unit/Bed#: -01 Encounter: [id number]  Primary Care Provider: Brooklyn Liriano MD   Date and time admitted to hospital: 7/15/2022  2:23 AM    * Chest pain  Assessment & Plan  · Pt presented with squeezing centralized chest pain at rest  Now resolved  Some associated SOB which is also improved   · Likely noncardiac  · Had cardiac cath in 2019 with 90% occlusion of mid cx s/p PATRICA  · Recent echo from 2/2022 reviewed   · RALPH 3  · Troponins x 3 negative  · EKG negative for ischemia  · Telemetry in place, pt with lifevest   · Follows with cardiology as an outpatient, recommend close follow up once medically stable for discharge   · If chest pain comes back or worsens would obtain inpatient cardiology consultation due to extensive history while inpatient     Hemoptysis  Assessment & Plan  · Patient with new onset blood-tinged secretions from tracheostomy starting today  · Hemoglobin is stable  · She is on Eliquis for atrial fibrillation  · Will hold this x1 day in the setting of hemoptysis  · Patient frequently trying to clear secretions by herself  · Monitor hgb closely  · Consider pulm consult if no improvement to hemoptysis     Shortness of breath  Assessment & Plan  · With multiple ED visits and admissions for same   Again with usual presentation presenting for suctioning  · Multiple contributing factors including known medical non-compliance, HFrEF, severe anxiety, chronic hypoxic respiratory failure with tracheostomy, CAD  · Suspect mild decompensated HF which is now resolved   · CXR negative, BNP 400s   · S/p 1 x dose of IV lasix, no indication for additional IV diuretics at this time  · Continue pt's home Bumex 2 mg BID  · Trach care, suctioning, supp O2   · Fluid restriction, I&Os, daily weights   · Mild leukocytosis noted on CBC, no acute infectious process noted, pt is non-toxic appearing and hemodynamically stable      Acute on chronic HFrEF (heart failure with reduced ejection fraction) ([facility])  Assessment & Plan  Wt Readings from Last 3 Encounters:   07/17/22 76 8 kg (169 lb 6 4 oz)   06/29/22 81 7 kg (180 lb 3 2 oz)   06/21/22 84 kg (185 lb 3 oz)     · Noted to have very mild volume overload on admission with trace edema, rales on exam, given 1 x dose of IV lasix 80 mg   · Now appears to be euvolemic   · BNP only 472  · Echo 2/2022: LVEF 50%, moderate hypokinesis of inferior and anterolateral wall  Normal diastolic function  · Diuretic: Bumex 2g b i d , Spironolactone 100mg daily, continue  · Daily weights, I&Os  · Na/fluid restriction   · Resume potassium supplementation at decreased 40 mEq daily, titrate to BID dosing (home dose) as indicated        Chronic hypoxemic respiratory failure (HCC)  Assessment & Plan  · W/ tracheostomy  Baseline FiO2 35%  · Trach care  · Continue atrovent, xopenex, p r n  albuterol  · Currently at baseline    A-fib [facility])  Assessment & Plan  · NSR on admission   · Currently on amiodarone 100 mg daily and Toprol XL 50 mg BID for rate control   · Continue Eliquis 5 mg BID for a/c  · Pt with mild blood from trach   · Likely in the setting of attempting to clear her secretions  · Consider holding eliquis if this continues   · Also maintained on Brilinta 90 mg BID    Type 2 diabetes mellitus treated with insulin [facility])  Assessment & Plan  Lab Results   Component Value Date    HGBA1C 12 7 (H) 05/22/2022       Recent Labs     07/16/22  1119 07/16/22  1555 07/16/22  2057 07/17/22  0742   POCGLU 366* 361* 261* 187*       Blood Sugar Average: Last 72 hrs:  (P) 294 [id number]   · Uncontrolled per A1c   Presented with hyperglycemia , fortunately non-acidotic, no AG, no DKA  · Home regimen: Recently updated with last admission to Lantus 16U hs, Humalog 4U t i d  w/ meals   · However continues to have hyperglycemia here, last glucose 366   · Will increase Lantus to 20 units QHS and humalog 9 units TID with meals with SSI coverage  · QID glucose checks   · Hypoglycemia protocol  · Consistent carb diet   · Monitor and adjust regimen as needed     Anxiety  Assessment & Plan  · Continue home ativan p r n  and lexapro 10 mg daily   · Mood appears stable on evaluation today     Microcytic anemia  Assessment & Plan  · Hgb 9 2, microcytic  · Baseline appears to be 9-10 on review   · Iron panel here with iron stores of 29, compatible with NERI  · Start on PO ferrous sulfate daily   · Trend hgb    History of Ventricular tachycardia (HCC)  Assessment & Plan  · W/ LifeVest in place  · Continue BB, Brilinta, amiodarone and Eliquis  · Follows with cardiology as an outpatient, last outpatient appointment in the beginning of June of this year   · Pt advised to follow up with EP for possible ICD placement  · Continue telemetry monitoring for now     Hypomagnesemia  Assessment & Plan  · Magnesium 1 6  · S/p 2 g IV magnesium  · Repeat tomorrow    Hypertension  Assessment & Plan  · BP appears stable on review   · Continue losartan 50 mg BID, Toprol XL 50 mg BID, Bumex 2 mg BID and Spironolactone 100 mg daily   · Monitor         VTE Pharmacologic Prophylaxis: VTE Score: 3 Moderate Risk (Score 3-4) - Pharmacological DVT Prophylaxis Contraindicated  Sequential Compression Devices Ordered  Patient Centered Rounds: I performed bedside rounds with nursing staff today  Discussions with Specialists or Other Care Team Provider: DEREK RN     Education and Discussions with Family / Patient: Updated  (son) via phone  Time Spent for Care: 45 minutes  More than 50% of total time spent on counseling and coordination of care as described above      Current Length of Stay: 2 day(s)  Current Patient Status: Inpatient   Certification Statement: The patient will continue to require additional inpatient hospital stay due to improvement of her hemoptysis   Discharge Plan: Anticipate discharge in 24-48 hrs to home     Code Status: Level 1 - Full Code    Subjective:   Patient reports that she is having blood coming out of her trach starting today  She has been clearing her secretions significantly  Denies any lightheadedness, or dizziness  Hemoglobin has remained stable  She is also reporting headache  Objective:     Vitals:   Temp (24hrs), Av 6 °F (36 4 °C), Min:96 9 °F (36 1 °C), Max:98 4 °F (36 9 °C)    Temp:  [96 9 °F (36 1 °C)-98 4 °F (36 9 °C)] 98 4 °F (36 9 °C)  HR:  [59-67] 59  Resp:  [18-20] 18  BP: (124-147)/(70-93) 129/80  SpO2:  [97 %-100 %] 99 %  Body mass index is 25 02 kg/m²  Input and Output Summary (last 24 hours): Intake/Output Summary (Last 24 hours) at 2022 1211  Last data filed at 2022 0301  Gross per 24 hour   Intake 605 ml   Output 2500 ml   Net -1895 ml       Physical Exam:   Physical Exam  Vitals and nursing note reviewed  Constitutional:       General: She is not in acute distress  HENT:      Head: Normocephalic and atraumatic  Mouth/Throat:      Mouth: Mucous membranes are moist       Pharynx: Oropharynx is clear  Eyes:      General:         Right eye: No discharge  Left eye: No discharge  Cardiovascular:      Rate and Rhythm: Normal rate and regular rhythm  Pulses: Normal pulses  Heart sounds: Normal heart sounds  No murmur heard  Pulmonary:      Effort: Pulmonary effort is normal       Breath sounds: Normal breath sounds  Comments: Trach in lace  At 9L trach collar   Abdominal:      General: Abdomen is flat  Palpations: Abdomen is soft  Musculoskeletal:      Cervical back: No muscular tenderness  Right lower leg: No edema  Left lower leg: No edema  Skin:     General: Skin is warm and dry  Coloration: Skin is not jaundiced  Neurological:      General: No focal deficit present  Mental Status: She is alert and oriented to person, place, and time     Psychiatric:         Mood and Affect: Mood normal  Additional Data:     Labs:  Results from last 7 days   Lab Units 07/17/22  0455 07/16/22  0444 07/15/22  0334   WBC Thousand/uL 11 23*   < > 11 18*   HEMOGLOBIN g/dL 9 7*   < > 10 1*   HEMATOCRIT % 32 5*   < > 33 6*   PLATELETS Thousands/uL 533*   < > 613*   NEUTROS PCT %  --   --  72   LYMPHS PCT %  --   --  16   MONOS PCT %  --   --  6   EOS PCT %  --   --  4    < > = values in this interval not displayed  Results from last 7 days   Lab Units 07/17/22  0455 07/16/22  0444 07/15/22  0334   SODIUM mmol/L 138   < > 134*   POTASSIUM mmol/L 3 6   < > 3 5   CHLORIDE mmol/L 95*   < > 94*   CO2 mmol/L 34*   < > 30   BUN mg/dL 21   < > 12   CREATININE mg/dL 1 01   < > 0 83   ANION GAP mmol/L 9   < > 10   CALCIUM mg/dL 9 0   < > 9 1   ALBUMIN g/dL 3 1*   < > 3 5   TOTAL BILIRUBIN mg/dL  --   --  0 51   ALK PHOS U/L  --   --  95   ALT U/L  --   --  3*   AST U/L  --   --  4*   GLUCOSE RANDOM mg/dL 217*   < > 452*    < > = values in this interval not displayed  Results from last 7 days   Lab Units 07/17/22  1134 07/17/22  0742 07/16/22  2057 07/16/22  1555 07/16/22  1119 07/16/22  0751 07/15/22  2106 07/15/22  1522 07/15/22  1115 07/15/22  0638   POC GLUCOSE mg/dl 295* 187* 261* 361* 366* 316* 290* 119 297* 456*               Lines/Drains:  Invasive Devices  Report    Peripheral Intravenous Line  Duration           Peripheral IV 07/15/22 Left Antecubital 2 days          Airway  Duration           Surgical Airway Shiley Fenestrated 137 days                   Telemetry:  Telemetry Orders (From admission, onward)             LifeVest Patient: Continuous Telemetry Monitoring during hospitalization (non-expiring)  Continuous LifeVest Telemetry Monitoring        References:    LifeVest Policy                 Telemetry Reviewed: Normal Sinus Rhythm  Indication for Continued Telemetry Use: life vest will remain on tele while inpatient              Imaging: No pertinent imaging reviewed      Recent Cultures (last 7 days): Last 24 Hours Medication List:   Current Facility-Administered Medications   Medication Dose Route Frequency Provider Last Rate    acetaminophen  650 mg Oral Q6H PRN Mayi Zheng DO      albuterol  2 5 mg Nebulization Q4H PRN Leda Jones PA-C      amiodarone  100 mg Oral Daily With Breakfast Leda Jones PA-C      atorvastatin  40 mg Oral Daily With Dinner Leda Jones PA-C      bumetanide  2 mg Oral BID Leda Jones PA-C      escitalopram  10 mg Oral Daily Leda Jones PA-C      ferrous sulfate  325 mg Oral Daily With Breakfast Destiny Luna PA-C      guaiFENesin  1,200 mg Oral Q12H [address] Mayi Zheng DO      insulin glargine  20 Units Subcutaneous HS Destiny Luna, DONALD      insulin lispro  1-6 Units Subcutaneous TID AC Leda Jones PA-C      insulin lispro  1-6 Units Subcutaneous HS Leda Jones PA-C      insulin lispro  9 Units Subcutaneous TID With Meals Destiny Luna PA-C      ipratropium  0 5 mg Nebulization TID [facility], DONALD      levalbuterol  1 25 mg Nebulization TID Mayi Zheng DO      LORazepam  0 5 mg Oral Q8H PRN Leda Jones PA-C      losartan  50 mg Oral Q12H Leda Jones PA-C      metoprolol succinate  50 mg Oral Q12H Leda Jones PA-C      pantoprazole  40 mg Oral Early Morning Leda Jones PA-C      potassium chloride  40 mEq Oral Daily Destiny Luna PA-C      pregabalin  75 mg Oral Daily Leda Jones PA-C      spironolactone  100 mg Oral Daily Leda Jones PA-C      ticagrelor  90 mg Oral Q12H Leda Jones PA-C          [facility], [facility]    **Please Note: This note may have been constructed using a voice recognition system  **

## 2022-07-17 NOTE — ASSESSMENT & PLAN NOTE
Wt Readings from Last 3 Encounters:   07/17/22 76 8 kg (169 lb 6 4 oz)   06/29/22 81 7 kg (180 lb 3 2 oz)   06/21/22 84 kg (185 lb 3 oz)     · Noted to have very mild volume overload on admission with trace edema, rales on exam, given 1 x dose of IV lasix 80 mg   · Now appears to be euvolemic   · BNP only 472  · Echo 2/2022: LVEF 50%, moderate hypokinesis of inferior and anterolateral wall   Normal diastolic function  · Diuretic: Bumex 2g b i d , Spironolactone 100mg daily, continue  · Daily weights, I&Os  · Na/fluid restriction   · Resume potassium supplementation at decreased 40 mEq daily, titrate to BID dosing (home dose) as indicated

## 2022-07-17 NOTE — PLAN OF CARE
Problem: Potential for Falls  Goal: Patient will remain free of falls  Description: INTERVENTIONS:  - Educate patient/family on patient safety including physical limitations  - Instruct patient to call for assistance with activity   - Consult OT/PT to assist with strengthening/mobility   - Keep Call bell within reach  - Keep bed low and locked with side rails adjusted as appropriate  - Keep care items and personal belongings within reach  - Initiate and maintain comfort rounds  - Make Fall Risk Sign visible to staff  - Initiate/Maintain bed alarm  - Apply yellow socks and bracelet for high fall risk patients  - Consider moving patient to room near nurses station  Outcome: Progressing     Problem: MOBILITY - ADULT  Goal: Maintain or return to baseline ADL function  Description: INTERVENTIONS:  -  Assess patient's ability to carry out ADLs; assess patient's baseline for ADL function and identify physical deficits which impact ability to perform ADLs (bathing, care of mouth/teeth, toileting, grooming, dressing, etc )  - Assess/evaluate cause of self-care deficits   - Assess range of motion  - Assess patient's mobility; develop plan if impaired  - Assess patient's need for assistive devices and provide as appropriate  - Encourage maximum independence but intervene and supervise when necessary  - Involve family in performance of ADLs  - Assess for home care needs following discharge   - Consider OT consult to assist with ADL evaluation and planning for discharge  - Provide patient education as appropriate  Outcome: Progressing  Goal: Maintains/Returns to pre admission functional level  Description: INTERVENTIONS:  - Perform BMAT or MOVE assessment daily    - Set and communicate daily mobility goal to care team and patient/family/caregiver     - Collaborate with rehabilitation services on mobility goals if consulted  - Out of bed for toileting  - Record patient progress and toleration of activity level   Outcome: Progressing

## 2022-07-17 NOTE — ASSESSMENT & PLAN NOTE
· NSR on admission   · Currently on amiodarone 100 mg daily and Toprol XL 50 mg BID for rate control   · Continue Eliquis 5 mg BID for a/c  · Pt with mild blood from trach   · Likely in the setting of attempting to clear her secretions     · Consider holding eliquis if this continues   · Also maintained on Brilinta 90 mg BID

## 2022-07-17 NOTE — ASSESSMENT & PLAN NOTE
· Patient with new onset blood-tinged secretions from tracheostomy starting today  · Hemoglobin is stable  · She is on Eliquis for atrial fibrillation  · Will hold this x1 day in the setting of hemoptysis  · Patient frequently trying to clear secretions by herself  · Monitor hgb closely     · Consider pulm consult if no improvement to hemoptysis

## 2022-07-17 NOTE — PLAN OF CARE
Problem: Potential for Falls  Goal: Patient will remain free of falls  Description: INTERVENTIONS:  - Educate patient/family on patient safety including physical limitations  - Instruct patient to call for assistance with activity   - Consult OT/PT to assist with strengthening/mobility   - Keep Call bell within reach  - Keep bed low and locked with side rails adjusted as appropriate  - Keep care items and personal belongings within reach  - Initiate and maintain comfort rounds  - Make Fall Risk Sign visible to staff  - Offer Toileting every 2 Hours, in advance of need  - Initiate/Maintain bed alarm  - Obtain necessary fall risk management equipment:   - Apply yellow socks and bracelet for high fall risk patients  - Consider moving patient to room near nurses station  Outcome: Progressing     Problem: MOBILITY - ADULT  Goal: Maintain or return to baseline ADL function  Description: INTERVENTIONS:  -  Assess patient's ability to carry out ADLs; assess patient's baseline for ADL function and identify physical deficits which impact ability to perform ADLs (bathing, care of mouth/teeth, toileting, grooming, dressing, etc )  - Assess/evaluate cause of self-care deficits   - Assess range of motion  - Assess patient's mobility; develop plan if impaired  - Assess patient's need for assistive devices and provide as appropriate  - Encourage maximum independence but intervene and supervise when necessary  - Involve family in performance of ADLs  - Assess for home care needs following discharge   - Consider OT consult to assist with ADL evaluation and planning for discharge  - Provide patient education as appropriate  Outcome: Progressing  Goal: Maintains/Returns to pre admission functional level  Description: INTERVENTIONS:  - Perform BMAT or MOVE assessment daily    - Set and communicate daily mobility goal to care team and patient/family/caregiver     - Collaborate with rehabilitation services on mobility goals if consulted  - Perform Range of Motion 2 times a day  - Reposition patient every 2 hours  - Dangle patient 2 times a day  - Stand patient 2 times a day  - Ambulate patient 2 times a day  - Out of bed to chair 2 times a day   - Out of bed for meals 2 times a day  - Out of bed for toileting  - Record patient progress and toleration of activity level   Outcome: Progressing     Problem: Prexisting or High Potential for Compromised Skin Integrity  Goal: Skin integrity is maintained or improved  Description: INTERVENTIONS:  - Identify patients at risk for skin breakdown  - Assess and monitor skin integrity  - Assess and monitor nutrition and hydration status  - Monitor labs   - Assess for incontinence   - Turn and reposition patient  - Assist with mobility/ambulation  - Relieve pressure over bony prominences  - Avoid friction and shearing  - Provide appropriate hygiene as needed including keeping skin clean and dry  - Evaluate need for skin moisturizer/barrier cream  - Collaborate with interdisciplinary team   - Patient/family teaching  - Consider wound care consult   Outcome: Progressing     Problem: Nutrition/Hydration-ADULT  Goal: Nutrient/Hydration intake appropriate for improving, restoring or maintaining nutritional needs  Description: Monitor and assess patient's nutrition/hydration status for malnutrition  Collaborate with interdisciplinary team and initiate plan and interventions as ordered  Monitor patient's weight and dietary intake as ordered or per policy  Utilize nutrition screening tool and intervene as necessary  Determine patient's food preferences and provide high-protein, high-caloric foods as appropriate       INTERVENTIONS:  - Monitor oral intake, urinary output, labs, and treatment plans  - Assess nutrition and hydration status and recommend course of action  - Evaluate amount of meals eaten  - Assist patient with eating if necessary   - Allow adequate time for meals  - Recommend/ encourage appropriate diets, oral nutritional supplements, and vitamin/mineral supplements  - Order, calculate, and assess calorie counts as needed  - Recommend, monitor, and adjust tube feedings and TPN/PPN based on assessed needs  - Assess need for intravenous fluids  - Provide specific nutrition/hydration education as appropriate  - Include patient/family/caregiver in decisions related to nutrition  Outcome: Progressing     Problem: CARDIOVASCULAR - ADULT  Goal: Absence of cardiac dysrhythmias or at baseline rhythm  Description: INTERVENTIONS:  - Continuous cardiac monitoring, vital signs, obtain 12 lead EKG if ordered  - Administer antiarrhythmic and heart rate control medications as ordered  - Monitor electrolytes and administer replacement therapy as ordered  Outcome: Progressing     Problem: RESPIRATORY - ADULT  Goal: Achieves optimal ventilation and oxygenation  Description: INTERVENTIONS:  - Assess for changes in respiratory status  - Assess for changes in mentation and behavior  - Position to facilitate oxygenation and minimize respiratory effort  - Oxygen administered by appropriate delivery if ordered  - Initiate smoking cessation education as indicated  - Encourage broncho-pulmonary hygiene including cough, deep breathe, Incentive Spirometry  - Assess the need for suctioning and aspirate as needed  - Assess and instruct to report SOB or any respiratory difficulty  - Respiratory Therapy support as indicated  Outcome: Progressing     Problem: METABOLIC, FLUID AND ELECTROLYTES - ADULT  Goal: Glucose maintained within target range  Description: INTERVENTIONS:  - Monitor Blood Glucose as ordered  - Assess for signs and symptoms of hyperglycemia and hypoglycemia  - Administer ordered medications to maintain glucose within target range  - Assess nutritional intake and initiate nutrition service referral as needed  Outcome: Progressing

## 2022-07-18 ENCOUNTER — APPOINTMENT (INPATIENT)
Dept: RADIOLOGY | Facility: HOSPITAL | Age: 57
DRG: 291 | End: 2022-07-18
Payer: COMMERCIAL

## 2022-07-18 PROBLEM — J93.11 PRIMARY SPONTANEOUS PNEUMOTHORAX: Status: ACTIVE | Noted: 2022-07-18

## 2022-07-18 LAB
ALBUMIN SERPL BCP-MCNC: 3.2 G/DL (ref 3.5–5)
ANION GAP SERPL CALCULATED.3IONS-SCNC: 11 MMOL/L (ref 4–13)
BUN SERPL-MCNC: 32 MG/DL (ref 5–25)
CALCIUM ALBUM COR SERPL-MCNC: 10.2 MG/DL (ref 8.3–10.1)
CALCIUM SERPL-MCNC: 9.6 MG/DL (ref 8.4–10.2)
CHLORIDE SERPL-SCNC: 93 MMOL/L (ref 96–108)
CO2 SERPL-SCNC: 32 MMOL/L (ref 21–32)
CREAT SERPL-MCNC: 1.3 MG/DL (ref 0.6–1.3)
ERYTHROCYTE [DISTWIDTH] IN BLOOD BY AUTOMATED COUNT: 16.4 % (ref 11.6–15.1)
GFR SERPL CREATININE-BSD FRML MDRD: 45 ML/MIN/1.73SQ M
GLUCOSE SERPL-MCNC: 194 MG/DL (ref 65–140)
GLUCOSE SERPL-MCNC: 197 MG/DL (ref 65–140)
GLUCOSE SERPL-MCNC: 261 MG/DL (ref 65–140)
GLUCOSE SERPL-MCNC: 267 MG/DL (ref 65–140)
GLUCOSE SERPL-MCNC: 277 MG/DL (ref 65–140)
HCT VFR BLD AUTO: 34 % (ref 34.8–46.1)
HGB BLD-MCNC: 10.2 G/DL (ref 11.5–15.4)
MAGNESIUM SERPL-MCNC: 2.1 MG/DL (ref 1.9–2.7)
MCH RBC QN AUTO: 23.2 PG (ref 26.8–34.3)
MCHC RBC AUTO-ENTMCNC: 30 G/DL (ref 31.4–37.4)
MCV RBC AUTO: 77 FL (ref 82–98)
MRSA NOSE QL CULT: NORMAL
PHOSPHATE SERPL-MCNC: 4.6 MG/DL (ref 2.7–4.5)
PLATELET # BLD AUTO: 633 THOUSANDS/UL (ref 149–390)
PMV BLD AUTO: 9.7 FL (ref 8.9–12.7)
POTASSIUM SERPL-SCNC: 4.3 MMOL/L (ref 3.5–5.3)
PROCALCITONIN SERPL-MCNC: 0.24 NG/ML
RBC # BLD AUTO: 4.4 MILLION/UL (ref 3.81–5.12)
SODIUM SERPL-SCNC: 136 MMOL/L (ref 135–147)
WBC # BLD AUTO: 12.58 THOUSAND/UL (ref 4.31–10.16)

## 2022-07-18 PROCEDURE — 99223 1ST HOSP IP/OBS HIGH 75: CPT | Performed by: INTERNAL MEDICINE

## 2022-07-18 PROCEDURE — 85027 COMPLETE CBC AUTOMATED: CPT | Performed by: STUDENT IN AN ORGANIZED HEALTH CARE EDUCATION/TRAINING PROGRAM

## 2022-07-18 PROCEDURE — 94760 N-INVAS EAR/PLS OXIMETRY 1: CPT

## 2022-07-18 PROCEDURE — 99239 HOSP IP/OBS DSCHRG MGMT >30: CPT | Performed by: PHYSICIAN ASSISTANT

## 2022-07-18 PROCEDURE — 84145 PROCALCITONIN (PCT): CPT | Performed by: PHYSICIAN ASSISTANT

## 2022-07-18 PROCEDURE — 71046 X-RAY EXAM CHEST 2 VIEWS: CPT

## 2022-07-18 PROCEDURE — 83735 ASSAY OF MAGNESIUM: CPT | Performed by: STUDENT IN AN ORGANIZED HEALTH CARE EDUCATION/TRAINING PROGRAM

## 2022-07-18 PROCEDURE — 82948 REAGENT STRIP/BLOOD GLUCOSE: CPT

## 2022-07-18 PROCEDURE — 94640 AIRWAY INHALATION TREATMENT: CPT

## 2022-07-18 PROCEDURE — 80069 RENAL FUNCTION PANEL: CPT | Performed by: STUDENT IN AN ORGANIZED HEALTH CARE EDUCATION/TRAINING PROGRAM

## 2022-07-18 PROCEDURE — 71045 X-RAY EXAM CHEST 1 VIEW: CPT

## 2022-07-18 PROCEDURE — NC001 PR NO CHARGE: Performed by: PHYSICIAN ASSISTANT

## 2022-07-18 RX ORDER — SPIRONOLACTONE 25 MG/1
100 TABLET ORAL DAILY
Status: CANCELLED | OUTPATIENT
Start: 2022-07-19

## 2022-07-18 RX ORDER — ATORVASTATIN CALCIUM 40 MG/1
40 TABLET, FILM COATED ORAL
Status: CANCELLED | OUTPATIENT
Start: 2022-07-18

## 2022-07-18 RX ORDER — SPIRONOLACTONE 25 MG/1
50 TABLET ORAL DAILY
Status: DISCONTINUED | OUTPATIENT
Start: 2022-07-19 | End: 2022-07-19

## 2022-07-18 RX ORDER — ESCITALOPRAM OXALATE 10 MG/1
10 TABLET ORAL DAILY
Status: CANCELLED | OUTPATIENT
Start: 2022-07-19

## 2022-07-18 RX ORDER — INSULIN GLARGINE 100 [IU]/ML
20 INJECTION, SOLUTION SUBCUTANEOUS
Status: CANCELLED | OUTPATIENT
Start: 2022-07-18

## 2022-07-18 RX ORDER — GUAIFENESIN 600 MG/1
1200 TABLET, EXTENDED RELEASE ORAL EVERY 12 HOURS SCHEDULED
Status: CANCELLED | OUTPATIENT
Start: 2022-07-18

## 2022-07-18 RX ORDER — BUMETANIDE 1 MG/1
2 TABLET ORAL 2 TIMES DAILY
Status: CANCELLED | OUTPATIENT
Start: 2022-07-18

## 2022-07-18 RX ORDER — FERROUS SULFATE 325(65) MG
325 TABLET ORAL
Status: CANCELLED | OUTPATIENT
Start: 2022-07-19

## 2022-07-18 RX ORDER — LORAZEPAM 0.5 MG/1
0.5 TABLET ORAL EVERY 8 HOURS PRN
Status: CANCELLED | OUTPATIENT
Start: 2022-07-18

## 2022-07-18 RX ORDER — LOSARTAN POTASSIUM 50 MG/1
50 TABLET ORAL EVERY 12 HOURS
Status: CANCELLED | OUTPATIENT
Start: 2022-07-18

## 2022-07-18 RX ORDER — BUMETANIDE 1 MG/1
2 TABLET ORAL
Status: DISCONTINUED | OUTPATIENT
Start: 2022-07-19 | End: 2022-07-19

## 2022-07-18 RX ORDER — PANTOPRAZOLE SODIUM 40 MG/1
40 TABLET, DELAYED RELEASE ORAL
Status: CANCELLED | OUTPATIENT
Start: 2022-07-19

## 2022-07-18 RX ORDER — POTASSIUM CHLORIDE 20MEQ/15ML
40 LIQUID (ML) ORAL DAILY
Status: CANCELLED | OUTPATIENT
Start: 2022-07-19

## 2022-07-18 RX ORDER — INSULIN LISPRO 100 [IU]/ML
9 INJECTION, SOLUTION INTRAVENOUS; SUBCUTANEOUS
Status: CANCELLED | OUTPATIENT
Start: 2022-07-18

## 2022-07-18 RX ORDER — ALBUTEROL SULFATE 2.5 MG/3ML
2.5 SOLUTION RESPIRATORY (INHALATION) EVERY 4 HOURS PRN
Status: CANCELLED | OUTPATIENT
Start: 2022-07-18

## 2022-07-18 RX ORDER — INSULIN LISPRO 100 [IU]/ML
1-6 INJECTION, SOLUTION INTRAVENOUS; SUBCUTANEOUS
Status: CANCELLED | OUTPATIENT
Start: 2022-07-18

## 2022-07-18 RX ORDER — ACETAMINOPHEN 325 MG/1
650 TABLET ORAL EVERY 6 HOURS PRN
Status: CANCELLED | OUTPATIENT
Start: 2022-07-18

## 2022-07-18 RX ORDER — AMIODARONE HYDROCHLORIDE 200 MG/1
100 TABLET ORAL
Status: CANCELLED | OUTPATIENT
Start: 2022-07-19

## 2022-07-18 RX ORDER — LEVALBUTEROL INHALATION SOLUTION 1.25 MG/3ML
1.25 SOLUTION RESPIRATORY (INHALATION)
Status: CANCELLED | OUTPATIENT
Start: 2022-07-18

## 2022-07-18 RX ORDER — METOPROLOL SUCCINATE 50 MG/1
50 TABLET, EXTENDED RELEASE ORAL EVERY 12 HOURS
Status: CANCELLED | OUTPATIENT
Start: 2022-07-18

## 2022-07-18 RX ORDER — PREGABALIN 75 MG/1
75 CAPSULE ORAL DAILY
Status: CANCELLED | OUTPATIENT
Start: 2022-07-19

## 2022-07-18 RX ADMIN — IPRATROPIUM BROMIDE 0.5 MG: 0.5 SOLUTION RESPIRATORY (INHALATION) at 07:58

## 2022-07-18 RX ADMIN — INSULIN GLARGINE 20 UNITS: 100 INJECTION, SOLUTION SUBCUTANEOUS at 21:48

## 2022-07-18 RX ADMIN — TICAGRELOR 90 MG: 90 TABLET ORAL at 06:06

## 2022-07-18 RX ADMIN — IPRATROPIUM BROMIDE 0.5 MG: 0.5 SOLUTION RESPIRATORY (INHALATION) at 14:03

## 2022-07-18 RX ADMIN — INSULIN LISPRO 9 UNITS: 100 INJECTION, SOLUTION INTRAVENOUS; SUBCUTANEOUS at 12:26

## 2022-07-18 RX ADMIN — POTASSIUM CHLORIDE 40 MEQ: 20 SOLUTION ORAL at 08:57

## 2022-07-18 RX ADMIN — BUMETANIDE 2 MG: 1 TABLET ORAL at 08:56

## 2022-07-18 RX ADMIN — INSULIN LISPRO 9 UNITS: 100 INJECTION, SOLUTION INTRAVENOUS; SUBCUTANEOUS at 08:58

## 2022-07-18 RX ADMIN — APIXABAN 5 MG: 5 TABLET, FILM COATED ORAL at 12:25

## 2022-07-18 RX ADMIN — METOPROLOL SUCCINATE 50 MG: 50 TABLET, EXTENDED RELEASE ORAL at 17:41

## 2022-07-18 RX ADMIN — FERROUS SULFATE TAB 325 MG (65 MG ELEMENTAL FE) 325 MG: 325 (65 FE) TAB at 08:57

## 2022-07-18 RX ADMIN — INSULIN LISPRO 3 UNITS: 100 INJECTION, SOLUTION INTRAVENOUS; SUBCUTANEOUS at 21:48

## 2022-07-18 RX ADMIN — APIXABAN 5 MG: 5 TABLET, FILM COATED ORAL at 17:42

## 2022-07-18 RX ADMIN — PANTOPRAZOLE SODIUM 40 MG: 40 TABLET, DELAYED RELEASE ORAL at 06:06

## 2022-07-18 RX ADMIN — LORAZEPAM 0.5 MG: 0.5 TABLET ORAL at 17:41

## 2022-07-18 RX ADMIN — ATORVASTATIN CALCIUM 40 MG: 40 TABLET, FILM COATED ORAL at 17:41

## 2022-07-18 RX ADMIN — LEVALBUTEROL HYDROCHLORIDE 1.25 MG: 1.25 SOLUTION RESPIRATORY (INHALATION) at 07:58

## 2022-07-18 RX ADMIN — ESCITALOPRAM OXALATE 10 MG: 10 TABLET ORAL at 08:57

## 2022-07-18 RX ADMIN — TICAGRELOR 90 MG: 90 TABLET ORAL at 17:40

## 2022-07-18 RX ADMIN — LEVALBUTEROL HYDROCHLORIDE 1.25 MG: 1.25 SOLUTION RESPIRATORY (INHALATION) at 14:03

## 2022-07-18 RX ADMIN — INSULIN LISPRO 2 UNITS: 100 INJECTION, SOLUTION INTRAVENOUS; SUBCUTANEOUS at 17:43

## 2022-07-18 RX ADMIN — IPRATROPIUM BROMIDE 0.5 MG: 0.5 SOLUTION RESPIRATORY (INHALATION) at 19:40

## 2022-07-18 RX ADMIN — LORAZEPAM 0.5 MG: 0.5 TABLET ORAL at 06:06

## 2022-07-18 RX ADMIN — INSULIN LISPRO 2 UNITS: 100 INJECTION, SOLUTION INTRAVENOUS; SUBCUTANEOUS at 12:26

## 2022-07-18 RX ADMIN — PREGABALIN 75 MG: 75 CAPSULE ORAL at 08:57

## 2022-07-18 RX ADMIN — GUAIFENESIN 1200 MG: 600 TABLET ORAL at 21:49

## 2022-07-18 RX ADMIN — AMIODARONE HYDROCHLORIDE 100 MG: 200 TABLET ORAL at 08:57

## 2022-07-18 RX ADMIN — METOPROLOL SUCCINATE 50 MG: 50 TABLET, EXTENDED RELEASE ORAL at 06:06

## 2022-07-18 RX ADMIN — SPIRONOLACTONE 100 MG: 25 TABLET ORAL at 08:56

## 2022-07-18 RX ADMIN — INSULIN LISPRO 9 UNITS: 100 INJECTION, SOLUTION INTRAVENOUS; SUBCUTANEOUS at 17:42

## 2022-07-18 RX ADMIN — LEVALBUTEROL HYDROCHLORIDE 1.25 MG: 1.25 SOLUTION RESPIRATORY (INHALATION) at 19:40

## 2022-07-18 RX ADMIN — LOSARTAN POTASSIUM 50 MG: 50 TABLET, FILM COATED ORAL at 06:06

## 2022-07-18 RX ADMIN — INSULIN LISPRO 3 UNITS: 100 INJECTION, SOLUTION INTRAVENOUS; SUBCUTANEOUS at 08:59

## 2022-07-18 RX ADMIN — GUAIFENESIN 1200 MG: 600 TABLET ORAL at 08:57

## 2022-07-18 NOTE — ASSESSMENT & PLAN NOTE
Wt Readings from Last 3 Encounters:   07/18/22 77 3 kg (170 lb 6 4 oz)   06/29/22 81 7 kg (180 lb 3 2 oz)   06/21/22 84 kg (185 lb 3 oz)     · Noted to have very mild volume overload on admission with trace edema, rales on exam, given 1 x dose of IV lasix 80 mg   · Now appears to be euvolemic   · BNP only 472  · Echo 2/2022: LVEF 50%, moderate hypokinesis of inferior and anterolateral wall   Normal diastolic function  · Diuretic: Bumex 2g b i d , Spironolactone 100mg daily, continue  · Daily weights, I&Os net neg >4 L  · Na/fluid restriction   · Resume potassium supplementation at decreased 40 mEq daily, titrate to BID dosing (home dose) as indicated

## 2022-07-18 NOTE — ASSESSMENT & PLAN NOTE
Lab Results   Component Value Date    HGBA1C 12 7 (H) 05/22/2022       Recent Labs     07/17/22  1134 07/17/22  1550 07/17/22 2051 07/18/22  0651   POCGLU 295* 219* 209* 267*       Blood Sugar Average: Last 72 hrs:  (P) 280 [id number]   · Uncontrolled per A1c   Presented with hyperglycemia , fortunately non-acidotic, no AG, no DKA  · Home regimen: Recently updated with last admission to Lantus 16U hs, Humalog 4U t i d  w/ meals   · Increased Lantus to 20 units QHS and humalog 9 units TID with meals with SSI coverage  · QID glucose checks   · Hypoglycemia protocol  · Consistent carb diet   · Monitor and adjust regimen as needed

## 2022-07-18 NOTE — DISCHARGE SUMMARY
[address] 46  SLIM Discharge- Anisha Bhatt 1965, 65 y o  female MRN: [id number]  Unit/Bed#: -01 Encounter: [id number]  Primary Care Provider: Carito Issa MD   Date and time admitted to hospital: 7/15/2022  2:23 AM    * Primary spontaneous pneumothorax  Assessment & Plan  Patient initially presented to the hospital with shortness of breath chest pain, found to have an acute CHF exacerbation now resolved  Patient with continued coughing and repeat CXR on 07/18 that revealed new right-sided pneumothorax  Patient will require chest tube decompression, unfortunately that is not available at this campus and patient will require transfer to higher level of care  She is currently hemodynamically stable, on 100% FiO2  Plan of care discussed with the patient at the bedside, her son was updated by phone  Awaiting transfer    Chest pain  Assessment & Plan  Pt presented with squeezing centralized chest pain at rest  Now resolved  CXR on admission without any acute cardiopulmonary findings  CXR 7/18 with new right-sided pneumothorax  Had cardiac cath in 2019 with 90% occlusion of mid cx s/p PATRICA  Recent echo from 2/2022 shows improved EF   RALPH 3, troponins x 3 negative  EKG negative for ischemia  Telemetry in place, pt with lifevest   Follows with cardiology as an outpatient, recommend close follow up once medically stable for discharge     Acute on chronic HFrEF (heart failure with reduced ejection fraction) ([facility] [address] )  Assessment & Plan  Wt Readings from Last 3 Encounters:   07/18/22 77 3 kg (170 lb 6 4 oz)   06/29/22 81 7 kg (180 lb 3 2 oz)   06/21/22 84 kg (185 lb 3 oz)     Noted to have very mild volume overload on admission with trace edema, rales on exam, given 1 x dose of IV lasix 80 mg   Now appears to be euvolemic   BNP only 472  Echo 2/2022: LVEF 50%, moderate hypokinesis of inferior and anterolateral wall   Normal diastolic function  Diuretic: Bumex 2g b i d , Spironolactone 100mg daily, continue  Daily weights, I&Os net neg >4 L  Na/fluid restriction   Resume potassium supplementation at decreased 40 mEq daily, titrate to BID dosing (home dose) as indicated        A-fib [facility])  Assessment & Plan  NSR on admission   Currently on amiodarone 100 mg daily and Toprol XL 50 mg BID for rate control   Continue Eliquis 5 mg BID for a/c  Pt with mild blood from trach 7/17, has since resolved    Chronic hypoxemic respiratory failure [facility])  Assessment & Plan  W/ tracheostomy  Baseline FiO2 35%  Continue with trach care, will need ENT follow-up for updated trach either inpatient vs outpatient   Continue atrovent, xopenex, p r n  albuterol    Shortness of breath  Assessment & Plan  With multiple ED visits and admissions for same  Again with usual presentation presenting for suctioning  Multiple contributing factors including known medical non-compliance, HFrEF, severe anxiety, chronic hypoxic respiratory failure with tracheostomy, CAD  Suspect mild decompensated HF on presentation, which is now resolved   CXR negative,   S/p 1 x dose of IV lasix, with improvement   Trach care, suctioning, supp O2   Fluid restriction, I&Os, daily weights   Mild persistent leukocytosis noted on CBC, no acute infectious process noted, pt is non-toxic appearing and afebrile  Repeat CXR as above revealing pneumothorax      Type 2 diabetes mellitus treated with insulin [facility])  Assessment & Plan  Lab Results   Component Value Date    HGBA1C 12 7 (H) 05/22/2022       Recent Labs     07/17/22  1550 07/17/22  2051 07/18/22  0651 07/18/22  1148   POCGLU 219* 209* 267* 197*       Blood Sugar Average: Last 72 hrs:  (P) 274 [id number]   Uncontrolled per A1c   Presented with hyperglycemia , fortunately non-acidotic, no AG, no DKA  Home regimen: Recently updated with last admission to Lantus 16U hs, Humalog 4U t i d  w/ meals   Increased Lantus to 20 units QHS and humalog 9 units TID with meals with SSI coverage  QID glucose checks   Hypoglycemia protocol  Consistent carb diet   Monitor and adjust regimen as needed     Hemoptysis  Assessment & Plan  Patient with new onset blood-tinged secretions from tracheostomy 7/17  Patient frequently trying to clear secretions by herself  Monitor hgb closely, stable  Eliquis held x1 days    History of Ventricular tachycardia [facility])  Assessment & Plan  Patient has a history of cardiac arrest, currently still has a LifeVest  Continue BB, Brilinta, amiodarone and Eliquis  Follows with cardiology as an outpatient, last outpatient appointment in the beginning of June of this year   Pt advised to follow up with EP for possible ICD placement  Patient will remain on telemetry monitoring    MODESTO (acute kidney injury) [facility])  Assessment & Plan  Patient with developing MODESTO, creatinine increased > 0 3 overnight, on multiple agents  Will hold Bumex 2 mg for tonight, patient euvolemic  Will discontinue losartan 50 mg  Will decrease spironolactone dose for tomorrow to 50 mg  Monitor blood pressure, repeat BMP in a m      Hypertension  Assessment & Plan  BP appears stable on review   Continue losartan 50 mg BID, Toprol XL 50 mg BID, Bumex 2 mg BID and Spironolactone 100 mg daily   Monitor     Anxiety  Assessment & Plan  Continue home ativan p r n  and lexapro 10 mg daily   Mood appears stable on evaluation today       Medical Problems                 Resolved Problems  Date Reviewed: 7/18/2022   None                 Discharging Physician / Practitioner: Emerson Geronimo PA-C   PCP: Erika Maynard MD  Admission Date:   Admission Orders (From admission, onward)       Ordered        07/15/22 1636  Inpatient Admission  Once            07/15/22 0535  Place in Observation  Once                          Discharge Date: 07/19/22     Consultations During Hospital Stay:  IP CONSULT TO PULMONOLOGY     Procedures Performed:   None     Significant Findings / Test Results:   XR chest pa & lateral   Final Result by Deepti Kendrick Sara Forrest MD (07/18 4985)      1  Interim development of moderate-sized right-sided pneumothorax without tension  2   Stable mild bilateral interstitial edema  XR chest 1 view portable   Final Result by Mora Joyner MD (07/15 3920)      Interstitial edema with small effusions and bibasilar atelectasis  Incidental Findings:   Right-sided non-tension pneumothorax      Test Results Pending at Discharge (will require follow up):   N/A     Outpatient Tests Requested:  N/A     Complications:  Pneumothorax     Reason for Admission: CHF exacerbation     Hospital Course:   Giacomo Lou is a 65 y o  female patient with a past medical history significant for V-tach and cardiac arrest, tracheostomy status, type 2 diabetes which is uncontrolled, chronic heart failure, history of medical noncompliance, anxiety who originally presented to the hospital on 7/15/2022 due to shortness of breath  Patient has been in and out of the hospital several times with the same complaint  It was felt the patient had an acute CHF exacerbation which improved significantly after IV Lasix  Patient was resumed on her home Bumex and other medications and monitored  Patient with persistent shortness of breath and cough, for which repeat imaging was obtained showing a new pneumothorax  Patient is being transferred to [facility] for IR evaluation and chest tube placement  Accepted to [facility] as level 2 step down  Will repeat CXR tonight if not transferred for chest tube placement today  Please see above list of diagnoses and related plan for additional information  Condition at Discharge: stable    Discharge Day Visit / Exam:   * Please refer to separate progress note for these details *    Discussion with Family: Updated  (son) via phone  Discharge instructions/Information to patient and family:   See after visit summary for information provided to patient and family        Provisions for Follow-Up Care:  See after visit summary for information related to follow-up care and any pertinent home health orders  Disposition:   [address] Transfer to [facility] Readmission: direct re-admit      Discharge Statement:  I spent >45 minutes discharging the patient  This time was spent on the day of discharge  I had direct contact with the patient on the day of discharge  Greater than 50% of the total time was spent examining patient, answering all patient questions, arranging and discussing plan of care with patient as well as directly providing post-discharge instructions  Additional time then spent on discharge activities  Discharge Medications:  See after visit summary for reconciled discharge medications provided to patient and/or family        **Please Note: This note may have been constructed using a voice recognition system**

## 2022-07-18 NOTE — UTILIZATION REVIEW
*  PLEASE SEE INITIAL REVIEW on 7/15/22 BY Keli COOPER      Continued Stay Review  Date: 7/16/22  [facility]                        7/15/22 AT 5 OBSERVATION - CONVERTED TO INPATIENT 7/16/22 AT 0535 2ND ACUTE ON CHRONIC HEART FAILURE WITH REDUCED LVEFX, PMHX  CAD, V-TACH, ATRIAL FIB ON ELIQUIS, CHRONIC HYPOXIC RESPIRATORY FAILURE WITH TRACHEOSTOMY - REQUIRES CONTINUED INPATIENT MANAGEMENT AFTER OBSERVATION + > 2 MIDNITES - FLUID RESTRICTION, DIURESIS, AIRWAY MANAGEMENT     Current Patient Class: Inpatient    Current Level of Care: Acute Med Surg     Start   Ordered   07/15/22 1637  Inpatient Admission  Once        Transfer Service: Hospitalist       Question Answer Comment   Level of Care Med Surg    Estimated length of stay More than 2 Midnights    Certification I certify that inpatient services are medically necessary for this patient for a duration of greater than two midnights  See H&P and MD Progress Notes for additional information about the patient's course of treatment          07/15/22 1636   07/15/22 0535  Place in Observation  Once        Question Answer Comment   Level of Care Med Surg    Bed request comments tele        07/15/22 0535     HPI:   7/15/22 AT 5 OBSERVATION - CONVERTED TO INPATIENT 7/16/22 AT 0535 2ND ACUTE ON CHRONIC HEART FAILURE WITH REDUCED LVEFX, PMHX  CAD, V-TACH, ATRIAL FIB ON ELIQUIS, CHRONIC HYPOXIC RESPIRATORY FAILURE WITH TRACHEOSTOMY - REQUIRES CONTINUED INPATIENT MANAGEMENT AFTER OBSERVATION + > 2 MIDNITES - FLUID RESTRICTION, DIURESIS, AIRWAY MANAGEMENT     7/16//22:  Acute on chronic HFrEF (heart failure with reduced ejection fraction) (HCC)  · mild volume overload with trace edema, rales on exam, given 1 x dose of IV lasix 80 mg   · Diuretic: Bumex 2g b i d , Spironolactone 100mg daily, continue  · Daily weights, I&Os  · Na/fluid restriction   · Resume potassium supplementation at decreased 40 mEq daily, titrate to BID dosing (home dose) as indicated     Vital Signs: Temp (24hrs), Av 4 °F (36 3 °C), Min:96 2 °F (35 7 °C), Max:98 4 °F (36 9 °C)   Temp:  [96 2 °F (35 7 °C)-98 4 °F (36 9 °C)] 96 2 °F (35 7 °C)  HR:  [58-80] 58  Resp:  [17-19] 18  BP: (127-155)/(77-92) 134/83  SpO2:  [90 %-100 %] 100 % with Trach mask at 9 L/min  Body mass index is 26 08 kg/m²  Respiratory Data     1942  2347  0723  0740  1132  1448  1531  1920  2300  0759   O2 Device Trach mask Trach mask Trach mask Trach mask Trach mask Trach mask Trach mask Trach mask Trach mask Trach mask   O2 Therapy   Oxygen     Oxygen     Oxygen     Oxygen        O2 Flow Rate (L/min) (L/min) 9 9 9 9 9   9 9        Intake/Output Summary (Last 24 hours) at 2022 1400:  Gross per 24 hour   Intake 240 ml   Output 1200 ml   Net -960 ml     Pertinent Labs/Diagnostic Results:   Results from last 7 days   Lab Units 22  0444 07/15/22  0334   WBC Thousand/uL 11 16* 11 18*   HEMOGLOBIN g/dL 9 2* 10 1*   HEMATOCRIT % 30 5* 33 6*   PLATELETS Thousands/uL 475* 613*   NEUTROS ABS Thousands/µL  --  8 19*     Results from last 7 days   Lab Units 22  0444 07/15/22  0334   SODIUM mmol/L 138 134*   POTASSIUM mmol/L 3 9 3 5   CHLORIDE mmol/L 96 94*   CO2 mmol/L 31 30   ANION GAP mmol/L 11 10   BUN mg/dL 17 12   CREATININE mg/dL 0 94 0 83   EGFR ml/min/1 73sq m 68 79   CALCIUM mg/dL 8 5 9 1   MAGNESIUM mg/dL 1 5*  --    PHOSPHORUS mg/dL 4 2  --      Results from last 7 days   Lab Units 22  0444 07/15/22  0334   AST U/L  --  4*   ALT U/L  --  3*   ALK PHOS U/L  --  95   TOTAL PROTEIN g/dL  --  8 3   ALBUMIN g/dL 2 8* 3 5   TOTAL BILIRUBIN mg/dL  --  0 51     Results from last 7 days   Lab Units 22  2057 22  1555 22  1119 22  0751 07/15/22  2106 07/15/22  1522 07/15/22  1115   POC GLUCOSE mg/dl 261* 361* 366* 316* 290* 119 297*     Results from last 7 days   Lab Units 22  0444 07/15/22  0334   GLUCOSE RANDOM mg/dL 254* 452* Results from last 7 days   Lab Units 07/15/22  0744 07/15/22  0533 07/15/22  0334   HS TNI 0HR ng/L  --   --  18   HS TNI 2HR ng/L  --  20  --    HSTNI D2 ng/L  --  2  --    HS TNI 4HR ng/L 24  --   --    HSTNI D4 ng/L 6  --   --      Results from last 7 days   Lab Units 07/15/22  0550   BNP pg/mL 472*     Results from last 7 days   Lab Units 07/15/22  0334   FERRITIN ng/mL 119     Diet German/CHO Controlled; Consistent Carbohydrate Diet Level 2 (5 carb servings/75 grams CHO/meal); Fluid Restriction 1800 ML, Sodium 2 GM     Dietary Nutritional Supplements Ensure Enlive BID with Meals    Scheduled Medications:  amiodarone, 100 mg, Oral, Daily With Breakfast  apixaban, 5 mg, Oral, BID  atorvastatin, 40 mg, Oral, Daily With Dinner  bumetanide, 2 mg, Oral, BID  escitalopram, 10 mg, Oral, Daily  ferrous sulfate, 325 mg, Oral, Daily With Breakfast  guaiFENesin, 1,200 mg, Oral, Q12H CEFERINO  insulin glargine, 20 Units, Subcutaneous, HS  insulin lispro, 1-6 Units, Subcutaneous, TID AC  insulin lispro, 1-6 Units, Subcutaneous, HS  insulin lispro, 9 Units, Subcutaneous, TID With Meals  ipratropium, 0 5 mg, Nebulization, TID  levalbuterol, 1 25 mg, Nebulization, TID  losartan, 50 mg, Oral, Q12H  metoprolol succinate, 50 mg, Oral, Q12H  pantoprazole, 40 mg, Oral, Early Morning  potassium chloride, 40 mEq, Oral, Daily  pregabalin, 75 mg, Oral, Daily  spironolactone, 100 mg, Oral, Daily  ticagrelor, 90 mg, Oral, Q12H    Continuous IV Infusions:  NONE  PRN Meds:  acetaminophen, 650 mg, Oral, Q6H PRN - 7/15 X 1, 7/16 X 1  albuterol, 2 5 mg, Nebulization, Q4H PRN - 7/15 X 1  LORazepam, 0 5 mg, Oral, Q8H PRN - 7/15 X 2, 7/16 X 1    Discharge Plan: To be determined   Inpatient Case Management following for all discharge needs    Network Utilization Review Department  ATTENTION: Please call with any questions or concerns to [phone] and carefully listen to the prompts so that you are directed to the right person   All voicemails are confidential   Svetlana Pelletier all requests for admission clinical reviews, approved or denied determinations and any other requests to dedicated fax number below belonging to the campus where the patient is receiving treatment   List of dedicated fax numbers for the Facilities:  [address] DENIALS (Administrative/Medical Necessity) [phone]   [address] (Maternity/NICU/Pediatrics) [phone]   [address]  [address] [facility] [place] [address] [id number] [place] [address] [address] [facility] [facility] [phone]

## 2022-07-18 NOTE — ASSESSMENT & PLAN NOTE
Patient initially presented to the hospital with shortness of breath chest pain, found to have an acute CHF exacerbation now resolved  Patient with continued coughing and repeat CXR on 07/18 that revealed new right-sided pneumothorax    · Patient will require chest tube decompression, unfortunately that is not available at this campus and patient will require transfer to higher level of care  · She is currently hemodynamically stable, on 100% FiO2  · Plan of care discussed with the patient at the bedside, her son was updated by phone  · Awaiting transfer

## 2022-07-18 NOTE — ASSESSMENT & PLAN NOTE
Lab Results   Component Value Date    HGBA1C 12 7 (H) 05/22/2022       Recent Labs     07/17/22  1550 07/17/22  2051 07/18/22  0651 07/18/22  1148   POCGLU 219* 209* 267* 197*       Blood Sugar Average: Last 72 hrs:  (P) 274 [id number]   · Uncontrolled per A1c   Presented with hyperglycemia , fortunately non-acidotic, no AG, no DKA  · Home regimen: Recently updated with last admission to Lantus 16U hs, Humalog 4U t i d  w/ meals   · Increased Lantus to 20 units QHS and humalog 9 units TID with meals with SSI coverage  · QID glucose checks   · Hypoglycemia protocol  · Consistent carb diet   · Monitor and adjust regimen as needed

## 2022-07-18 NOTE — ASSESSMENT & PLAN NOTE
· With multiple ED visits and admissions for same   Again with usual presentation presenting for suctioning  · Multiple contributing factors including known medical non-compliance, HFrEF, severe anxiety, chronic hypoxic respiratory failure with tracheostomy, CAD  · Suspect mild decompensated HF on presentation, which is now resolved   · CXR negative,   · S/p 1 x dose of IV lasix, with improvement   · Trach care, suctioning, supp O2   · Fluid restriction, I&Os, daily weights   · Mild persistent leukocytosis noted on CBC, no acute infectious process noted, pt is non-toxic appearing and afebrile  · Repeat CXR as above revealing pneumothorax

## 2022-07-18 NOTE — ASSESSMENT & PLAN NOTE
· Patient with new onset blood-tinged secretions from tracheostomy 7/17  · Patient frequently trying to clear secretions by herself  · Monitor hgb closely, stable  · Eliquis held x1 days

## 2022-07-18 NOTE — ASSESSMENT & PLAN NOTE
· NSR on admission   · Currently on amiodarone 100 mg daily and Toprol XL 50 mg BID for rate control   · Continue Eliquis 5 mg BID for a/c  · Pt with mild blood from trach 7/17, has since resolved

## 2022-07-18 NOTE — ASSESSMENT & PLAN NOTE
· With multiple ED visits and admissions for same   Again with usual presentation presenting for suctioning  · Multiple contributing factors including known medical non-compliance, HFrEF, severe anxiety, chronic hypoxic respiratory failure with tracheostomy, CAD  · Suspect mild decompensated HF on presentation, which is now resolved   · CXR negative,   · S/p 1 x dose of IV lasix, with improvement   · Trach care, suctioning, supp O2   · Fluid restriction, I&Os, daily weights   · Mild persistent leukocytosis noted on CBC, no acute infectious process noted, pt is non-toxic appearing and afebrile  · Will repeat CXR

## 2022-07-18 NOTE — ASSESSMENT & PLAN NOTE
· Pt presented with squeezing centralized chest pain at rest  Now resolved    · CXR on admission without any acute cardiopulmonary findings  · CXR 7/18 with new right-sided pneumothorax  · Had cardiac cath in 2019 with 90% occlusion of mid cx s/p PATRICA  · Recent echo from 2/2022 shows improved EF   · RALPH 3, troponins x 3 negative  · EKG negative for ischemia  · Telemetry in place, pt with lifevest   · Follows with cardiology as an outpatient, recommend close follow up once medically stable for discharge

## 2022-07-18 NOTE — TREATMENT PLAN
Spoke to Radiology regarding the CXR results  Patient updated at the bedside  Remains hemodynamically stable  Respiratory therapy did increase the oxygen to 100% FiO2  Pulmonology consultation pending, however patient will require chest tube decompression for right-sided pneumothorax  Unfortunately, this is not able to be managed at this campus and patient will require transfer, likely to [facility]

## 2022-07-18 NOTE — ASSESSMENT & PLAN NOTE
· NSR on admission   · Currently on amiodarone 100 mg daily and Toprol XL 50 mg BID for rate control   · Continue Eliquis 5 mg BID for a/c  · Pt with mild blood from trach   · Likely in the setting of attempting to clear her secretions

## 2022-07-18 NOTE — ASSESSMENT & PLAN NOTE
· Patient has a history of cardiac arrest, currently still has a LifeVest  · Continue BB, Brilinta, amiodarone and Eliquis  · Follows with cardiology as an outpatient, last outpatient appointment in the beginning of June of this year   · Pt advised to follow up with EP for possible ICD placement  · Patient will remain on telemetry monitoring

## 2022-07-18 NOTE — ASSESSMENT & PLAN NOTE
· Patient with developing MODESTO, creatinine increased > 0 3 overnight, on multiple agents  · Will hold Bumex 2 mg for tonight, patient euvolemic  · Will discontinue losartan 50 mg  · Will decrease spironolactone dose for tomorrow to 50 mg  · Monitor blood pressure, repeat BMP in a m

## 2022-07-18 NOTE — PLAN OF CARE
Problem: Potential for Falls  Goal: Patient will remain free of falls  Description: INTERVENTIONS:  - Educate patient/family on patient safety including physical limitations  - Instruct patient to call for assistance with activity   - Consult OT/PT to assist with strengthening/mobility   - Keep Call bell within reach  - Keep bed low and locked with side rails adjusted as appropriate  - Keep care items and personal belongings within reach  - Initiate and maintain comfort rounds  - Make Fall Risk Sign visible to staff  - Initiate/Maintain bed alarm  - Apply yellow socks and bracelet for high fall risk patients  - Consider moving patient to room near nurses station  Outcome: Progressing     Problem: MOBILITY - ADULT  Goal: Maintain or return to baseline ADL function  Description: INTERVENTIONS:  -  Assess patient's ability to carry out ADLs; assess patient's baseline for ADL function and identify physical deficits which impact ability to perform ADLs (bathing, care of mouth/teeth, toileting, grooming, dressing, etc )  - Assess/evaluate cause of self-care deficits   - Assess range of motion  - Assess patient's mobility; develop plan if impaired  - Assess patient's need for assistive devices and provide as appropriate  - Encourage maximum independence but intervene and supervise when necessary  - Involve family in performance of ADLs  - Assess for home care needs following discharge   - Consider OT consult to assist with ADL evaluation and planning for discharge  - Provide patient education as appropriate  Outcome: Progressing

## 2022-07-18 NOTE — ASSESSMENT & PLAN NOTE
· Pt presented with squeezing centralized chest pain at rest  Now resolved  Some associated SOB which is also improved    Suspect noncardiac etiology   · Had cardiac cath in 2019 with 90% occlusion of mid cx s/p PATRICA  · Recent echo from 2/2022 shows improved EF   · RALPH 3, troponins x 3 negative  · EKG negative for ischemia  · Telemetry in place, pt with lifevest   · Follows with cardiology as an outpatient, recommend close follow up once medically stable for discharge

## 2022-07-18 NOTE — PROGRESS NOTES
[address]  SLIM Progress Note - Caity Cochran 1965, 65 y o  female MRN: [id number]  Unit/Bed#: -01 Encounter: [id number]  Primary Care Provider: Wayne Reed MD   Date and time admitted to hospital: 7/15/2022  2:23 AM    * Chest pain  Assessment & Plan  · Pt presented with squeezing centralized chest pain at rest  Now resolved  Some associated SOB which is also improved  Suspect noncardiac etiology   · Had cardiac cath in 2019 with 90% occlusion of mid cx s/p PATRICA  · Recent echo from 2/2022 shows improved EF   · RALPH 3, troponins x 3 negative  · EKG negative for ischemia  · Telemetry in place, pt with lifevest   · Follows with cardiology as an outpatient, recommend close follow up once medically stable for discharge     Acute on chronic HFrEF (heart failure with reduced ejection fraction) ([facility] [address] )  Assessment & Plan  Wt Readings from Last 3 Encounters:   07/18/22 77 3 kg (170 lb 6 4 oz)   06/29/22 81 7 kg (180 lb 3 2 oz)   06/21/22 84 kg (185 lb 3 oz)     · Noted to have very mild volume overload on admission with trace edema, rales on exam, given 1 x dose of IV lasix 80 mg   · Now appears to be euvolemic   · BNP only 472  · Echo 2/2022: LVEF 50%, moderate hypokinesis of inferior and anterolateral wall  Normal diastolic function  · Diuretic: Bumex 2g b i d , Spironolactone 100mg daily, continue  · Daily weights, I&Os net neg >4 L  · Na/fluid restriction   · Resume potassium supplementation at decreased 40 mEq daily, titrate to BID dosing (home dose) as indicated        A-fib (HCC)  Assessment & Plan  · NSR on admission   · Currently on amiodarone 100 mg daily and Toprol XL 50 mg BID for rate control   · Continue Eliquis 5 mg BID for a/c  · Pt with mild blood from trach   · Likely in the setting of attempting to clear her secretions  Chronic hypoxemic respiratory failure (HCC)  Assessment & Plan  · W/ tracheostomy   Baseline FiO2 35%  · Trach care  · Continue atrovent, xopenex, p r n  albuterol  · Currently at baseline    Shortness of breath  Assessment & Plan  · With multiple ED visits and admissions for same  Again with usual presentation presenting for suctioning  · Multiple contributing factors including known medical non-compliance, HFrEF, severe anxiety, chronic hypoxic respiratory failure with tracheostomy, CAD  · Suspect mild decompensated HF on presentation, which is now resolved   · CXR negative,   · S/p 1 x dose of IV lasix, with improvement   · Trach care, suctioning, supp O2   · Fluid restriction, I&Os, daily weights   · Mild persistent leukocytosis noted on CBC, no acute infectious process noted, pt is non-toxic appearing and afebrile  · Will repeat CXR       Type 2 diabetes mellitus treated with insulin [facility])  Assessment & Plan  Lab Results   Component Value Date    HGBA1C 12 7 (H) 05/22/2022       Recent Labs     07/17/22  1134 07/17/22  1550 07/17/22  2051 07/18/22  0651   POCGLU 295* 219* 209* 267*       Blood Sugar Average: Last 72 hrs:  (P) 280 [id number]   · Uncontrolled per A1c   Presented with hyperglycemia , fortunately non-acidotic, no AG, no DKA  · Home regimen: Recently updated with last admission to Lantus 16U hs, Humalog 4U t i d  w/ meals   · Increased Lantus to 20 units QHS and humalog 9 units TID with meals with SSI coverage  · QID glucose checks   · Hypoglycemia protocol  · Consistent carb diet   · Monitor and adjust regimen as needed     Hemoptysis  Assessment & Plan  · Patient with new onset blood-tinged secretions from tracheostomy 7/17  · Patient frequently trying to clear secretions by herself  · Monitor hgb closely, stable  · Eliquis held x1 days    History of Ventricular tachycardia ([facility] [address] )  Assessment & Plan  · W/ LifeVest in place  · Continue BB, Brilinta, amiodarone and Eliquis  · Follows with cardiology as an outpatient, last outpatient appointment in the beginning of June of this year   · Pt advised to follow up with EP for possible ICD placement  · Continue telemetry monitoring for now     Anxiety  Assessment & Plan  · Continue home ativan p r n  and lexapro 10 mg daily   · Mood appears stable on evaluation today           VTE Pharmacologic Prophylaxis: VTE Score: 3 Moderate Risk (Score 3-4) - Pharmacological DVT Prophylaxis Ordered: apixaban (Eliquis)  Patient Centered Rounds: I performed bedside rounds with nursing staff today  Discussions with Specialists or Other Care Team Provider: RT     Education and Discussions with Family / Patient: Updated  (son) via phone  Time Spent for Care: 30 minutes  More than 50% of total time spent on counseling and coordination of care as described above  Current Length of Stay: 3 day(s)  Current Patient Status: Inpatient   Certification Statement: The patient will continue to require additional inpatient hospital stay due to pending repeat CXR and procalcitonin level  Discharge Plan: Anticipate discharge later today or tomorrow to home  Code Status: Level 1 - Full Code    Subjective:   Patient worried about her cough  States that the secretions are thicker than her baseline, more copious in nature  Denies fevers or chills  She reports at home her son suctions for her, and is requesting RT to suction now  No blood with her cough this morning  Objective:     Vitals:   Temp (24hrs), Av 9 °F (36 6 °C), Min:96 3 °F (35 7 °C), Max:98 7 °F (37 1 °C)    Temp:  [96 3 °F (35 7 °C)-98 7 °F (37 1 °C)] 96 3 °F (35 7 °C)  HR:  [56-66] 56  Resp:  [16-18] 16  BP: (114-133)/(69-80) 116/69  SpO2:  [98 %-100 %] 100 %  Body mass index is 25 16 kg/m²  Input and Output Summary (last 24 hours): Intake/Output Summary (Last 24 hours) at 2022 0805  Last data filed at 2022 0600  Gross per 24 hour   Intake 1796 ml   Output 2610 ml   Net -814 ml       Physical Exam:   Physical Exam  Vitals and nursing note reviewed  Constitutional:       Appearance: Normal appearance  She is not toxic-appearing  Neck:      Trachea: Tracheostomy and abnormal tracheal secretions present  Cardiovascular:      Rate and Rhythm: Normal rate and regular rhythm  Pulmonary:      Effort: Pulmonary effort is normal  No respiratory distress  Breath sounds: Rhonchi (very coarse throughout) present  No wheezing  Abdominal:      General: Bowel sounds are normal  There is no distension  Palpations: Abdomen is soft  Tenderness: There is no abdominal tenderness  Skin:     Coloration: Skin is not pale  Neurological:      Mental Status: She is alert and oriented to person, place, and time  Psychiatric:         Mood and Affect: Mood normal            Additional Data:     Labs:  Results from last 7 days   Lab Units 07/18/22  0513 07/16/22  0444 07/15/22  0334   WBC Thousand/uL 12 58*   < > 11 18*   HEMOGLOBIN g/dL 10 2*   < > 10 1*   HEMATOCRIT % 34 0*   < > 33 6*   PLATELETS Thousands/uL 633*   < > 613*   NEUTROS PCT %  --   --  72   LYMPHS PCT %  --   --  16   MONOS PCT %  --   --  6   EOS PCT %  --   --  4    < > = values in this interval not displayed  Results from last 7 days   Lab Units 07/18/22  0513 07/16/22 0444 07/15/22  0334   SODIUM mmol/L 136   < > 134*   POTASSIUM mmol/L 4 3   < > 3 5   CHLORIDE mmol/L 93*   < > 94*   CO2 mmol/L 32   < > 30   BUN mg/dL 32*   < > 12   CREATININE mg/dL 1 30   < > 0 83   ANION GAP mmol/L 11   < > 10   CALCIUM mg/dL 9 6   < > 9 1   ALBUMIN g/dL 3 2*   < > 3 5   TOTAL BILIRUBIN mg/dL  --   --  0 51   ALK PHOS U/L  --   --  95   ALT U/L  --   --  3*   AST U/L  --   --  4*   GLUCOSE RANDOM mg/dL 194*   < > 452*    < > = values in this interval not displayed           Results from last 7 days   Lab Units 07/18/22  0651 07/17/22 2051 07/17/22  1550 07/17/22  1134 07/17/22  0742 07/16/22 2057 07/16/22  1555 07/16/22  1119 07/16/22  0751 07/15/22  2106 07/15/22  1522 07/15/22  1115   POC GLUCOSE mg/dl 267* 209* 219* 295* 187* 261* 361* 366* 316* 290* 119 297*               Lines/Drains:  Invasive Devices  Report    Peripheral Intravenous Line  Duration           Peripheral IV 07/15/22 Left Antecubital 3 days          Airway  Duration           Surgical Airway Shiley Fenestrated 138 days                  Telemetry:  Telemetry Orders (From admission, onward)             LifeVest Patient: Continuous Telemetry Monitoring during hospitalization (non-expiring)  Continuous LifeVest Telemetry Monitoring        References:    LifeVest Policy                 Telemetry Reviewed: Normal Sinus Rhythm  Indication for Continued Telemetry Use: Lifevest (remains on tele entire hospital stay)             Imaging: Reviewed radiology reports from this admission including: chest xray and Personally reviewed the following imaging: chest xray    Recent Cultures (last 7 days):         Last 24 Hours Medication List:   Current Facility-Administered Medications   Medication Dose Route Frequency Provider Last Rate    acetaminophen  650 mg Oral Q6H PRN Mayi Zheng DO      albuterol  2 5 mg Nebulization Q4H PRN Leda Conde PA-C      amiodarone  100 mg Oral Daily With Breakfast Leda Conde PA-C      atorvastatin  40 mg Oral Daily With [place], PA-GAIL      bumetanide  2 mg Oral BID Leda Conde PA-C      escitalopram  10 mg Oral Daily Leda Conde PA-C      ferrous sulfate  325 mg Oral Daily With Breakfast Juancarlos Olvera PA-C      guaiFENesin  1,200 mg Oral Q12H [address] Mayi Zheng DO      insulin glargine  20 Units Subcutaneous HS Juancarlos Olvera PA-C      insulin lispro  1-6 Units Subcutaneous TID AC Leda Conde PA-C      insulin lispro  1-6 Units Subcutaneous HS Leda Conde PA-C      insulin lispro  9 Units Subcutaneous TID With Meals Juancarlos Olvera PA-C      ipratropium  0 5 mg Nebulization TID Leda Conde PA-C      levalbuterol  1 25 mg Nebulization TID Mayi Zheng DO  LORazepam  0 5 mg Oral Q8H PRN Leda Jones, DONALD      losartan  50 mg Oral Q12H Leda Jones, DONALD      metoprolol succinate  50 mg Oral Q12H Leda Jones, DONALD      pantoprazole  40 mg Oral Early Morning Leda Jones, DONALD      potassium chloride  40 mEq Oral Daily Destiny Luna, DONALD      pregabalin  75 mg Oral Daily Leda Jones, DONALD      spironolactone  100 mg Oral Daily Leda Jones, DONALD      ticagrelor  90 mg Oral Q12H Leda Jones, DONALD          Today, Patient Was Seen By: Ryan Guallpa PA-C    **Please Note: This note may have been constructed using a voice recognition system  **

## 2022-07-18 NOTE — CONSULTS
Consultation - Pulmonary Medicine   Santa Levy 65 y o  female MRN: [id number]  Unit/Bed#: -01 Encounter: [id number]      Assessment and Plan:    1  Acute Right-sided  pneumothorax:  The patient's chest x-ray done this morning showed about 30% right apical pneumothorax  This is likely secondary to excessive coughing  Currently she is asymptomatic with regard to this pneumothorax  However she would need to be monitored closely for any deterioration  she would likely need a small bore chest tube placed by IR and would need to be monitored closely     Continue with oxygen supplementation with FiO2 100%  The patient is being arranged to be transferred to [place]  2  Chronic hypoxic respiratory failure:  She has been on tracheostomy collar FiO2 30%  this has been changed to 100% FiO2 in view of the right-sided pneumothorax  She is saturating well      3  Status post tracheostomy:  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%  She had prolonged ventilation following cardiac arrest   It is deemed that she is not a candidate for decannulation at this time  She has a old type Shiley tracheostomy tube  This would need to be replaced  Recommend ENT consultation      4  Congestive heart failure:  She has history of chronic systolic and diastolic heart failure and has been on diuretic therapy with bumetanide and spironolactone  She also has history of cardiac arrhythmias and has been on LifeVest    She has history of atrial fibrillation and has been on amiodarone  She is also on systemic anticoagulation with Eliquis      5  COPD emphysema:  She was a smoker in the past   Her previous CT scan had shown evidence of structural emphysema  She has no previous PFTs  Recommend nebulized bronchodilator with DuoNeb p r n  Grecia [place]   She has minimal secretions      Spoke to the patient answered all questions      Discussed with Rudy Mackenzie the AP on the case      Thank you for allowing me to participate in the care of the patient          History of Present Illness      Physician Requesting Consult: Corey Curry DO     Reason for Consult / Principal Problem:  Acute right Pneumothorax    Hx and PE limited by:  Tracheostomy status    HPI: Linda Fam is a 65y o  year old female with past medical history of diabetes, congestive heart failure with preserved ejection fraction, anxiety, hypertension atrial fibrillation and cardiac arrhythmias flu is on chronic tracheostomy for subglottic stenosis who was admitted with shortness of breath and chest pain  She was found to be in congestive heart failure and was diuresed with Lasix/Bumex  Her chest x-ray from 7/16 showed pulmonary vascular congestion  Her cough and shortness of breath has subsequently improved  However she had a chest x-ray this morning which showed an acute right-sided pneumothorax about 30%  She has been saturating well and currently does not have any shortness of breath or cough or wheeze  She denied any fever or chills  She has been feeling much better  We were consulted for the pneumothorax  She has been able to bring up phlegm which is mostly clear  She reportedly had blood-tinged sputum on 2 occasions over the weekend  Currently this has subsided  She denied any fever or chills  She has a cuffless Shiley tracheostomy tube which is old fashion  This would need to be changed  She was deemed not a candidate for decannulation before  She is also found to have emphysema and has been on bronchodilator therapy  Consults    Review of Systems   Constitutional: Negative for appetite change, chills, fatigue and fever  HENT: Positive for rhinorrhea  Negative for hearing loss, sneezing, sore throat, trouble swallowing and voice change  Eyes: Negative for visual disturbance  Respiratory: Positive for cough and shortness of breath  Negative for chest tightness and wheezing  Cardiovascular: Positive for chest pain   Negative for palpitations and leg swelling  Gastrointestinal: Negative for abdominal pain, constipation, diarrhea, nausea and vomiting  Genitourinary: Negative for dysuria, frequency and urgency  Musculoskeletal: Negative for arthralgias and joint swelling  Skin: Negative for rash  Neurological: Negative for dizziness, syncope, light-headedness and headaches  Psychiatric/Behavioral: Negative for agitation and sleep disturbance  The patient is not nervous/anxious  Historical Information   Past Medical History:   Diagnosis Date    Anxiety     Aortic aneurysm ([address] )     Arthritis     Depression     Diabetes mellitus (HCC)     Fibromyalgia     GERD (gastroesophageal reflux disease)     GERD (gastroesophageal reflux disease)     H/O cardiovascular stress test 09/2018    no ischemia  EF 70%   H/O echocardiogram 01/2019    EF 60%  Mild LVH  trivial effusion   Hyperlipidemia     Hypertension     Migraines     Psychiatric disorder     anxiety    Uncontrolled hypertension 2/25/2015    Last Assessment & Plan:  BP today above goal <140/90, apparently asymptomatic  Prior BP elevations were attributed to not taking medication  Consider increased medication if persistent on f/u  Past Surgical History:   Procedure Laterality Date    BACK SURGERY      Lumbar epidural steroid injection    CARDIAC CATHETERIZATION N/A 10/22/2021    Procedure: Cardiac pci;  Surgeon: Alfonso Ellsworth MD;  [facility]; Service: Cardiology    CARDIAC CATHETERIZATION  10/22/2021    Procedure: Cardiac catheterization;  Surgeon: Alfonso Ellsworth MD;  [facility]; Service: Cardiology    CARDIAC CATHETERIZATION Left 10/27/2021    Procedure: Cardiac Left Heart Cath;  Surgeon: Irina George MD;  [facility]; Service: Cardiology    CARDIAC CATHETERIZATION N/A 10/27/2021    Procedure: Cardiac pci;  Surgeon: Irina George MD;  [facility];   Service: Cardiology    CARDIAC CATHETERIZATION N/A 10/28/2021    Procedure: Cardiac pci;  Surgeon: Dariela Chambers DO;  [facility]; Service: Cardiology    CARPAL TUNNEL RELEASE Left      SECTION      CHOLECYSTECTOMY      COLONOSCOPY      incomplete    COLONOSCOPY      EYE SURGERY      HYSTERECTOMY      Total    OVARIAN CYST REMOVAL      PEG W/TRACHEOSTOMY PLACEMENT N/A 2021    Procedure: TRACHEOSTOMY WITH INSERTION PEG TUBE;  Surgeon: Ortiz Fung DO;  Location: [facility] MAIN [facility];  Service: General    TUBAL LIGATION      UPPER GASTROINTESTINAL ENDOSCOPY       Social History   Social History     Substance and Sexual Activity   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  Social History     Substance and Sexual Activity   Drug Use Yes    Types: Marijuana    Comment: medical; seldom use     E-Cigarette/Vaping    E-Cigarette Use Never User      E-Cigarette/Vaping Substances    Nicotine No     THC No     CBD No     Flavoring No     Other No     Unknown No      Social History     Tobacco Use   Smoking Status Former Smoker    Packs/day: 1 00    Years: 30 00    Pack years: 30     Types: Cigarettes   Smokeless Tobacco Never Used     Occupational History:  Noncontributory    Family History: non-contributory    Meds/Allergies   all current active meds have been reviewed    Allergies   Allergen Reactions    Metformin Diarrhea    Clonidine Rash     Patch        Objective   Vitals: Blood pressure 95/63, pulse 61, temperature (!) 97 3 °F (36 3 °C), temperature source Tympanic, resp  rate 16, height 5' 9" (1 753 m), weight 77 3 kg (170 lb 6 4 oz), SpO2 100 %  ,Body mass index is 25 16 kg/m²      Intake/Output Summary (Last 24 hours) at 2022 1506  Last data filed at 2022 0705  Gross per 24 hour   Intake 1550 ml   Output 2150 ml   Net -600 ml     Invasive Devices  Report    Peripheral Intravenous Line  Duration           Peripheral IV 07/15/22 Left Antecubital 3 days          Airway  Duration           Surgical Airway Mariana Fenestrated 138 days                Physical Exam  Vitals reviewed  Constitutional:       General: She is not in acute distress  Appearance: She is not ill-appearing or toxic-appearing  HENT:      Head: Normocephalic  Mouth/Throat:      Mouth: Mucous membranes are moist    Eyes:      General: No scleral icterus  Conjunctiva/sclera: Conjunctivae normal    Neck:      Comments: Cuffless Mariana tracheostomy  Currently on tracheostomy collar  Cardiovascular:      Rate and Rhythm: Normal rate and regular rhythm  Heart sounds: Normal heart sounds  No murmur heard  Pulmonary:      Effort: Pulmonary effort is normal  No respiratory distress  Breath sounds: Normal breath sounds  No stridor  No wheezing, rhonchi or rales  Chest:      Chest wall: No tenderness  Abdominal:      General: Bowel sounds are normal       Palpations: Abdomen is soft  Tenderness: There is no abdominal tenderness  There is no guarding  Musculoskeletal:      Cervical back: No rigidity  Right lower leg: No edema  Left lower leg: No edema  Lymphadenopathy:      Cervical: No cervical adenopathy  Skin:     Coloration: Skin is not jaundiced or pale  Findings: No rash  Neurological:      Mental Status: She is alert and oriented to person, place, and time  Psychiatric:         Mood and Affect: Mood normal          Behavior: Behavior normal          Thought Content: Thought content normal          Judgment: Judgment normal          Lab Results: I have personally reviewed pertinent lab results  White cell count 12 58 hemoglobin 10 2 creatinine 1 3 potassium 4 3  Albumin 3 2  Imaging Studies: I have personally reviewed pertinent films in PACS  EKG, Pathology, and Other Studies: I have personally reviewed pertinent reports      VTE Prophylaxis: Sequential compression device (Venodyne)  on apixaban    Code Status: Level 1 - Full Code  Advance Directive and Living Will:      Power of :    POLST:      None

## 2022-07-18 NOTE — ASSESSMENT & PLAN NOTE
· W/ tracheostomy   Baseline FiO2 35%  · Continue with trach care, will need ENT follow-up for updated trach either inpatient vs outpatient   · Continue atrovent, xopenex, p r n  albuterol

## 2022-07-19 ENCOUNTER — APPOINTMENT (INPATIENT)
Dept: RADIOLOGY | Facility: HOSPITAL | Age: 57
DRG: 199 | End: 2022-07-19
Payer: COMMERCIAL

## 2022-07-19 ENCOUNTER — HOSPITAL ENCOUNTER (INPATIENT)
Facility: HOSPITAL | Age: 57
LOS: 14 days | DRG: 199 | End: 2022-08-02
Attending: INTERNAL MEDICINE | Admitting: INTERNAL MEDICINE
Payer: COMMERCIAL

## 2022-07-19 ENCOUNTER — TRANSITIONAL CARE MANAGEMENT (OUTPATIENT)
Dept: INTERNAL MEDICINE CLINIC | Facility: CLINIC | Age: 57
End: 2022-07-19

## 2022-07-19 VITALS
WEIGHT: 172.4 LBS | SYSTOLIC BLOOD PRESSURE: 121 MMHG | OXYGEN SATURATION: 100 % | TEMPERATURE: 98 F | RESPIRATION RATE: 17 BRPM | HEIGHT: 69 IN | BODY MASS INDEX: 25.53 KG/M2 | HEART RATE: 68 BPM | DIASTOLIC BLOOD PRESSURE: 83 MMHG

## 2022-07-19 DIAGNOSIS — R52 PAIN: ICD-10-CM

## 2022-07-19 DIAGNOSIS — J93.11 PRIMARY SPONTANEOUS PNEUMOTHORAX: Primary | ICD-10-CM

## 2022-07-19 DIAGNOSIS — D72.829 LEUKOCYTOSIS: ICD-10-CM

## 2022-07-19 DIAGNOSIS — J93.9 PNEUMOTHORAX: ICD-10-CM

## 2022-07-19 DIAGNOSIS — R06.02 SHORTNESS OF BREATH: ICD-10-CM

## 2022-07-19 DIAGNOSIS — R06.89 RESPIRATORY INSUFFICIENCY: ICD-10-CM

## 2022-07-19 PROBLEM — N17.9 ACUTE KIDNEY INJURY SUPERIMPOSED ON CHRONIC KIDNEY DISEASE (HCC): Status: ACTIVE | Noted: 2022-07-19

## 2022-07-19 PROBLEM — E87.1 HYPONATREMIA: Status: ACTIVE | Noted: 2022-07-19

## 2022-07-19 PROBLEM — N18.9 ACUTE KIDNEY INJURY SUPERIMPOSED ON CHRONIC KIDNEY DISEASE (HCC): Status: ACTIVE | Noted: 2022-07-19

## 2022-07-19 PROBLEM — E87.5 HYPERKALEMIA: Status: ACTIVE | Noted: 2022-07-19

## 2022-07-19 LAB
ANION GAP SERPL CALCULATED.3IONS-SCNC: 10 MMOL/L (ref 4–13)
ANION GAP SERPL CALCULATED.3IONS-SCNC: 7 MMOL/L (ref 4–13)
ANION GAP SERPL CALCULATED.3IONS-SCNC: 7 MMOL/L (ref 4–13)
ATRIAL RATE: 59 BPM
ATRIAL RATE: 62 BPM
BASOPHILS # BLD AUTO: 0.08 THOUSANDS/ΜL (ref 0–0.1)
BASOPHILS # BLD AUTO: 0.1 THOUSANDS/ΜL (ref 0–0.1)
BASOPHILS NFR BLD AUTO: 1 % (ref 0–1)
BASOPHILS NFR BLD AUTO: 1 % (ref 0–1)
BUN SERPL-MCNC: 47 MG/DL (ref 5–25)
CALCIUM SERPL-MCNC: 9.3 MG/DL (ref 8.4–10.2)
CALCIUM SERPL-MCNC: 9.4 MG/DL (ref 8.4–10.2)
CALCIUM SERPL-MCNC: 9.4 MG/DL (ref 8.4–10.2)
CHLORIDE SERPL-SCNC: 92 MMOL/L (ref 96–108)
CHLORIDE SERPL-SCNC: 92 MMOL/L (ref 96–108)
CHLORIDE SERPL-SCNC: 93 MMOL/L (ref 96–108)
CO2 SERPL-SCNC: 29 MMOL/L (ref 21–32)
CO2 SERPL-SCNC: 34 MMOL/L (ref 21–32)
CO2 SERPL-SCNC: 34 MMOL/L (ref 21–32)
CREAT SERPL-MCNC: 1.49 MG/DL (ref 0.6–1.3)
CREAT SERPL-MCNC: 1.51 MG/DL (ref 0.6–1.3)
CREAT SERPL-MCNC: 1.64 MG/DL (ref 0.6–1.3)
EOSINOPHIL # BLD AUTO: 1.34 THOUSAND/ΜL (ref 0–0.61)
EOSINOPHIL # BLD AUTO: 1.42 THOUSAND/ΜL (ref 0–0.61)
EOSINOPHIL NFR BLD AUTO: 10 % (ref 0–6)
EOSINOPHIL NFR BLD AUTO: 11 % (ref 0–6)
ERYTHROCYTE [DISTWIDTH] IN BLOOD BY AUTOMATED COUNT: 16.5 % (ref 11.6–15.1)
ERYTHROCYTE [DISTWIDTH] IN BLOOD BY AUTOMATED COUNT: 16.6 % (ref 11.6–15.1)
GFR SERPL CREATININE-BSD FRML MDRD: 34 ML/MIN/1.73SQ M
GFR SERPL CREATININE-BSD FRML MDRD: 38 ML/MIN/1.73SQ M
GFR SERPL CREATININE-BSD FRML MDRD: 38 ML/MIN/1.73SQ M
GLUCOSE SERPL-MCNC: 262 MG/DL (ref 65–140)
GLUCOSE SERPL-MCNC: 292 MG/DL (ref 65–140)
GLUCOSE SERPL-MCNC: 293 MG/DL (ref 65–140)
GLUCOSE SERPL-MCNC: 297 MG/DL (ref 65–140)
GLUCOSE SERPL-MCNC: 309 MG/DL (ref 65–140)
GLUCOSE SERPL-MCNC: 360 MG/DL (ref 65–140)
HCT VFR BLD AUTO: 34.4 % (ref 34.8–46.1)
HCT VFR BLD AUTO: 35.1 % (ref 34.8–46.1)
HGB BLD-MCNC: 10.1 G/DL (ref 11.5–15.4)
HGB BLD-MCNC: 10.2 G/DL (ref 11.5–15.4)
IMM GRANULOCYTES # BLD AUTO: 0.07 THOUSAND/UL (ref 0–0.2)
IMM GRANULOCYTES # BLD AUTO: 0.14 THOUSAND/UL (ref 0–0.2)
IMM GRANULOCYTES NFR BLD AUTO: 1 % (ref 0–2)
IMM GRANULOCYTES NFR BLD AUTO: 1 % (ref 0–2)
LYMPHOCYTES # BLD AUTO: 1.99 THOUSANDS/ΜL (ref 0.6–4.47)
LYMPHOCYTES # BLD AUTO: 2.29 THOUSANDS/ΜL (ref 0.6–4.47)
LYMPHOCYTES NFR BLD AUTO: 16 % (ref 14–44)
LYMPHOCYTES NFR BLD AUTO: 16 % (ref 14–44)
MCH RBC QN AUTO: 22.7 PG (ref 26.8–34.3)
MCH RBC QN AUTO: 23.2 PG (ref 26.8–34.3)
MCHC RBC AUTO-ENTMCNC: 29.1 G/DL (ref 31.4–37.4)
MCHC RBC AUTO-ENTMCNC: 29.4 G/DL (ref 31.4–37.4)
MCV RBC AUTO: 78 FL (ref 82–98)
MCV RBC AUTO: 79 FL (ref 82–98)
MONOCYTES # BLD AUTO: 1.03 THOUSAND/ΜL (ref 0.17–1.22)
MONOCYTES # BLD AUTO: 1.24 THOUSAND/ΜL (ref 0.17–1.22)
MONOCYTES NFR BLD AUTO: 8 % (ref 4–12)
MONOCYTES NFR BLD AUTO: 9 % (ref 4–12)
NEUTROPHILS # BLD AUTO: 8.01 THOUSANDS/ΜL (ref 1.85–7.62)
NEUTROPHILS # BLD AUTO: 9.4 THOUSANDS/ΜL (ref 1.85–7.62)
NEUTS SEG NFR BLD AUTO: 63 % (ref 43–75)
NEUTS SEG NFR BLD AUTO: 63 % (ref 43–75)
NRBC BLD AUTO-RTO: 0 /100 WBCS
NRBC BLD AUTO-RTO: 0 /100 WBCS
P AXIS: 153 DEGREES
P AXIS: 204 DEGREES
PLATELET # BLD AUTO: 537 THOUSANDS/UL (ref 149–390)
PLATELET # BLD AUTO: 592 THOUSANDS/UL (ref 149–390)
PMV BLD AUTO: 10.2 FL (ref 8.9–12.7)
PMV BLD AUTO: 9.6 FL (ref 8.9–12.7)
POTASSIUM SERPL-SCNC: 5.3 MMOL/L (ref 3.5–5.3)
POTASSIUM SERPL-SCNC: 5.5 MMOL/L (ref 3.5–5.3)
POTASSIUM SERPL-SCNC: 5.5 MMOL/L (ref 3.5–5.3)
PR INTERVAL: 158 MS
PR INTERVAL: 196 MS
PROCALCITONIN SERPL-MCNC: 0.22 NG/ML
PROCALCITONIN SERPL-MCNC: 0.27 NG/ML
QRS AXIS: 234 DEGREES
QRS AXIS: 238 DEGREES
QRSD INTERVAL: 124 MS
QRSD INTERVAL: 128 MS
QT INTERVAL: 506 MS
QT INTERVAL: 516 MS
QTC INTERVAL: 510 MS
QTC INTERVAL: 513 MS
RBC # BLD AUTO: 4.36 MILLION/UL (ref 3.81–5.12)
RBC # BLD AUTO: 4.49 MILLION/UL (ref 3.81–5.12)
SODIUM SERPL-SCNC: 131 MMOL/L (ref 135–147)
SODIUM SERPL-SCNC: 133 MMOL/L (ref 135–147)
SODIUM SERPL-SCNC: 134 MMOL/L (ref 135–147)
T WAVE AXIS: 146 DEGREES
T WAVE AXIS: 151 DEGREES
VENTRICULAR RATE: 59 BPM
VENTRICULAR RATE: 62 BPM
WBC # BLD AUTO: 12.52 THOUSAND/UL (ref 4.31–10.16)
WBC # BLD AUTO: 14.59 THOUSAND/UL (ref 4.31–10.16)

## 2022-07-19 PROCEDURE — 93010 ELECTROCARDIOGRAM REPORT: CPT | Performed by: INTERNAL MEDICINE

## 2022-07-19 PROCEDURE — 94640 AIRWAY INHALATION TREATMENT: CPT

## 2022-07-19 PROCEDURE — 84145 PROCALCITONIN (PCT): CPT | Performed by: PHYSICIAN ASSISTANT

## 2022-07-19 PROCEDURE — 80048 BASIC METABOLIC PNL TOTAL CA: CPT | Performed by: PHYSICIAN ASSISTANT

## 2022-07-19 PROCEDURE — 93005 ELECTROCARDIOGRAM TRACING: CPT

## 2022-07-19 PROCEDURE — 82948 REAGENT STRIP/BLOOD GLUCOSE: CPT

## 2022-07-19 PROCEDURE — 99223 1ST HOSP IP/OBS HIGH 75: CPT | Performed by: INTERNAL MEDICINE

## 2022-07-19 PROCEDURE — 94760 N-INVAS EAR/PLS OXIMETRY 1: CPT

## 2022-07-19 PROCEDURE — 85025 COMPLETE CBC W/AUTO DIFF WBC: CPT | Performed by: PHYSICIAN ASSISTANT

## 2022-07-19 PROCEDURE — 71045 X-RAY EXAM CHEST 1 VIEW: CPT

## 2022-07-19 PROCEDURE — 99232 SBSQ HOSP IP/OBS MODERATE 35: CPT

## 2022-07-19 RX ORDER — ACETAMINOPHEN 325 MG/1
650 TABLET ORAL EVERY 6 HOURS PRN
Status: DISCONTINUED | OUTPATIENT
Start: 2022-07-19 | End: 2022-07-20

## 2022-07-19 RX ORDER — INSULIN GLARGINE 100 [IU]/ML
23 INJECTION, SOLUTION SUBCUTANEOUS
Status: DISCONTINUED | OUTPATIENT
Start: 2022-07-19 | End: 2022-07-20

## 2022-07-19 RX ORDER — GUAIFENESIN 600 MG/1
1200 TABLET, EXTENDED RELEASE ORAL EVERY 12 HOURS SCHEDULED
Status: DISCONTINUED | OUTPATIENT
Start: 2022-07-19 | End: 2022-08-02 | Stop reason: HOSPADM

## 2022-07-19 RX ORDER — ALBUTEROL SULFATE 2.5 MG/3ML
2.5 SOLUTION RESPIRATORY (INHALATION) EVERY 4 HOURS PRN
Status: DISCONTINUED | OUTPATIENT
Start: 2022-07-19 | End: 2022-08-02 | Stop reason: HOSPADM

## 2022-07-19 RX ORDER — AMIODARONE HYDROCHLORIDE 200 MG/1
100 TABLET ORAL
Status: DISCONTINUED | OUTPATIENT
Start: 2022-07-20 | End: 2022-08-02 | Stop reason: HOSPADM

## 2022-07-19 RX ORDER — LEVALBUTEROL 1.25 MG/.5ML
1.25 SOLUTION, CONCENTRATE RESPIRATORY (INHALATION)
Status: DISCONTINUED | OUTPATIENT
Start: 2022-07-19 | End: 2022-08-02 | Stop reason: HOSPADM

## 2022-07-19 RX ORDER — BENZONATATE 100 MG/1
100 CAPSULE ORAL 3 TIMES DAILY PRN
Status: DISCONTINUED | OUTPATIENT
Start: 2022-07-19 | End: 2022-08-02 | Stop reason: HOSPADM

## 2022-07-19 RX ORDER — METOPROLOL SUCCINATE 50 MG/1
50 TABLET, EXTENDED RELEASE ORAL EVERY 12 HOURS
Status: DISCONTINUED | OUTPATIENT
Start: 2022-07-19 | End: 2022-07-29

## 2022-07-19 RX ORDER — ESCITALOPRAM OXALATE 10 MG/1
10 TABLET ORAL DAILY
Status: DISCONTINUED | OUTPATIENT
Start: 2022-07-20 | End: 2022-08-02 | Stop reason: HOSPADM

## 2022-07-19 RX ORDER — ATORVASTATIN CALCIUM 40 MG/1
40 TABLET, FILM COATED ORAL
Status: DISCONTINUED | OUTPATIENT
Start: 2022-07-19 | End: 2022-08-02 | Stop reason: HOSPADM

## 2022-07-19 RX ORDER — INSULIN LISPRO 100 [IU]/ML
2-12 INJECTION, SOLUTION INTRAVENOUS; SUBCUTANEOUS
Status: DISCONTINUED | OUTPATIENT
Start: 2022-07-19 | End: 2022-08-02 | Stop reason: HOSPADM

## 2022-07-19 RX ORDER — INSULIN LISPRO 100 [IU]/ML
2-12 INJECTION, SOLUTION INTRAVENOUS; SUBCUTANEOUS
Status: DISCONTINUED | OUTPATIENT
Start: 2022-07-19 | End: 2022-07-19 | Stop reason: HOSPADM

## 2022-07-19 RX ORDER — INSULIN LISPRO 100 [IU]/ML
2-12 INJECTION, SOLUTION INTRAVENOUS; SUBCUTANEOUS
Status: CANCELLED | OUTPATIENT
Start: 2022-07-19

## 2022-07-19 RX ORDER — SODIUM POLYSTYRENE SULFONATE 4.1 MEQ/G
15 POWDER, FOR SUSPENSION ORAL; RECTAL ONCE
Status: COMPLETED | OUTPATIENT
Start: 2022-07-19 | End: 2022-07-19

## 2022-07-19 RX ORDER — PREGABALIN 75 MG/1
75 CAPSULE ORAL DAILY
Status: DISCONTINUED | OUTPATIENT
Start: 2022-07-20 | End: 2022-08-02 | Stop reason: HOSPADM

## 2022-07-19 RX ORDER — PANTOPRAZOLE SODIUM 40 MG/1
40 TABLET, DELAYED RELEASE ORAL
Status: DISCONTINUED | OUTPATIENT
Start: 2022-07-20 | End: 2022-08-02 | Stop reason: HOSPADM

## 2022-07-19 RX ORDER — LORAZEPAM 0.5 MG/1
0.5 TABLET ORAL EVERY 8 HOURS PRN
Status: DISCONTINUED | OUTPATIENT
Start: 2022-07-19 | End: 2022-07-23

## 2022-07-19 RX ORDER — INSULIN GLARGINE 100 [IU]/ML
23 INJECTION, SOLUTION SUBCUTANEOUS
Status: DISCONTINUED | OUTPATIENT
Start: 2022-07-19 | End: 2022-07-19 | Stop reason: HOSPADM

## 2022-07-19 RX ORDER — INSULIN LISPRO 100 [IU]/ML
9 INJECTION, SOLUTION INTRAVENOUS; SUBCUTANEOUS
Status: DISCONTINUED | OUTPATIENT
Start: 2022-07-19 | End: 2022-07-20

## 2022-07-19 RX ORDER — FERROUS SULFATE 325(65) MG
325 TABLET ORAL
Status: DISCONTINUED | OUTPATIENT
Start: 2022-07-20 | End: 2022-08-02 | Stop reason: HOSPADM

## 2022-07-19 RX ORDER — INSULIN GLARGINE 100 [IU]/ML
23 INJECTION, SOLUTION SUBCUTANEOUS
Status: CANCELLED | OUTPATIENT
Start: 2022-07-19

## 2022-07-19 RX ADMIN — ACETAMINOPHEN 650 MG: 325 TABLET, FILM COATED ORAL at 08:56

## 2022-07-19 RX ADMIN — LEVALBUTEROL HYDROCHLORIDE 1.25 MG: 1.25 SOLUTION, CONCENTRATE RESPIRATORY (INHALATION) at 20:08

## 2022-07-19 RX ADMIN — GUAIFENESIN 1200 MG: 600 TABLET ORAL at 20:15

## 2022-07-19 RX ADMIN — LEVALBUTEROL HYDROCHLORIDE 1.25 MG: 1.25 SOLUTION, CONCENTRATE RESPIRATORY (INHALATION) at 14:39

## 2022-07-19 RX ADMIN — PANTOPRAZOLE SODIUM 40 MG: 40 TABLET, DELAYED RELEASE ORAL at 05:29

## 2022-07-19 RX ADMIN — GUAIFENESIN 1200 MG: 600 TABLET ORAL at 08:37

## 2022-07-19 RX ADMIN — INSULIN LISPRO 6 UNITS: 100 INJECTION, SOLUTION INTRAVENOUS; SUBCUTANEOUS at 18:43

## 2022-07-19 RX ADMIN — IPRATROPIUM BROMIDE 0.5 MG: 0.5 SOLUTION RESPIRATORY (INHALATION) at 07:38

## 2022-07-19 RX ADMIN — IPRATROPIUM BROMIDE 0.5 MG: 0.5 SOLUTION RESPIRATORY (INHALATION) at 14:39

## 2022-07-19 RX ADMIN — ESCITALOPRAM OXALATE 10 MG: 10 TABLET ORAL at 08:38

## 2022-07-19 RX ADMIN — LORAZEPAM 0.5 MG: 0.5 TABLET ORAL at 20:15

## 2022-07-19 RX ADMIN — INSULIN LISPRO 10 UNITS: 100 INJECTION, SOLUTION INTRAVENOUS; SUBCUTANEOUS at 21:11

## 2022-07-19 RX ADMIN — INSULIN LISPRO 9 UNITS: 100 INJECTION, SOLUTION INTRAVENOUS; SUBCUTANEOUS at 15:06

## 2022-07-19 RX ADMIN — PREGABALIN 75 MG: 75 CAPSULE ORAL at 08:37

## 2022-07-19 RX ADMIN — TICAGRELOR 90 MG: 90 TABLET ORAL at 05:29

## 2022-07-19 RX ADMIN — POTASSIUM CHLORIDE 40 MEQ: 20 SOLUTION ORAL at 08:36

## 2022-07-19 RX ADMIN — IPRATROPIUM BROMIDE 0.5 MG: 0.5 SOLUTION RESPIRATORY (INHALATION) at 20:08

## 2022-07-19 RX ADMIN — INSULIN LISPRO 4 UNITS: 100 INJECTION, SOLUTION INTRAVENOUS; SUBCUTANEOUS at 08:38

## 2022-07-19 RX ADMIN — INSULIN LISPRO 9 UNITS: 100 INJECTION, SOLUTION INTRAVENOUS; SUBCUTANEOUS at 18:48

## 2022-07-19 RX ADMIN — DESMOPRESSIN ACETATE 40 MG: 0.2 TABLET ORAL at 18:42

## 2022-07-19 RX ADMIN — FERROUS SULFATE TAB 325 MG (65 MG ELEMENTAL FE) 325 MG: 325 (65 FE) TAB at 08:38

## 2022-07-19 RX ADMIN — LORAZEPAM 0.5 MG: 0.5 TABLET ORAL at 08:56

## 2022-07-19 RX ADMIN — APIXABAN 5 MG: 5 TABLET, FILM COATED ORAL at 08:38

## 2022-07-19 RX ADMIN — SODIUM POLYSTYRENE SULFONATE 15 G: 1 POWDER ORAL; RECTAL at 10:14

## 2022-07-19 RX ADMIN — ALBUTEROL SULFATE 2.5 MG: 2.5 SOLUTION RESPIRATORY (INHALATION) at 03:00

## 2022-07-19 RX ADMIN — LEVALBUTEROL HYDROCHLORIDE 1.25 MG: 1.25 SOLUTION RESPIRATORY (INHALATION) at 07:38

## 2022-07-19 RX ADMIN — INSULIN LISPRO 9 UNITS: 100 INJECTION, SOLUTION INTRAVENOUS; SUBCUTANEOUS at 08:42

## 2022-07-19 RX ADMIN — TICAGRELOR 90 MG: 90 TABLET ORAL at 18:54

## 2022-07-19 RX ADMIN — BUMETANIDE 2 MG: 1 TABLET ORAL at 08:37

## 2022-07-19 RX ADMIN — APIXABAN 5 MG: 5 TABLET, FILM COATED ORAL at 18:42

## 2022-07-19 RX ADMIN — METOPROLOL SUCCINATE 50 MG: 50 TABLET, EXTENDED RELEASE ORAL at 18:42

## 2022-07-19 RX ADMIN — SPIRONOLACTONE 50 MG: 25 TABLET ORAL at 08:37

## 2022-07-19 RX ADMIN — AMIODARONE HYDROCHLORIDE 100 MG: 200 TABLET ORAL at 08:38

## 2022-07-19 RX ADMIN — INSULIN GLARGINE 23 UNITS: 100 INJECTION, SOLUTION SUBCUTANEOUS at 21:12

## 2022-07-19 NOTE — ASSESSMENT & PLAN NOTE
· On anticoagulation with Eliquis; caution due to reports of recent bleeding from tracheostomy  · On amiodarone and Toprol

## 2022-07-19 NOTE — ASSESSMENT & PLAN NOTE
· History of MI oct 2021  · On beta-blocker, Brilinta  · Apparently complained of chest pain on admission to [facility] but troponins were negative and EKG was nonischemic

## 2022-07-19 NOTE — ASSESSMENT & PLAN NOTE
· Patient apparently with history of VT cardiac arrest November 2021  Now with life vest--must be on telemetry if LifeVest off  Unclear why she has not yet followed up with EP to have ICD    Defer to outpatient cardiology

## 2022-07-19 NOTE — ASSESSMENT & PLAN NOTE
· Continue home medication:  · Ativan p r n  and lexapro 10 mg daily   · Mood appears stable on evaluation today

## 2022-07-19 NOTE — CONSULTS
Consultation - Pulmonary Medicine   Miguel Ángel Do 65 y o  female MRN: [id number]  Unit/Bed#: S -01 Encounter: [id number]      Assessment/Plan:    1  Acute pulmonary insufficiency on chronic hypoxic respiratory failure       -  currently on home O2 requirements of 10 L trach collar- 94%       -  continue maintain saturations greater than 89%       -  pulmonary toileting:  Deep breathing cough, OOB as tolerated, IS Q 1 hr    2  Primary spontaneous pneumothorax       -  etiology unclear possibly secondary to coughing may have had a bleb       -  IR consult for non emergent chest tube placement       -  will repeat chest x-ray at 2200 and tomorrow a m        -  If PNTX decreases in size by tomorrow am will DC IR consult    3  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        -  1/13/2022- tracheotomy placed        -  currently maintained 6 Shiley uncuffed        -  will continue trach care/suction as needed    4  Suspected COPD of unknown severity without acute exacerbation        -  Inpatient:  Albuterol as needed        -  home regimen:  Albuterol q 6h p r n         -  no indication for maintenance or steroid at this time    5  Suspected dysphagia       -  VBS scheduled Thursday       -  aspiration precaution       -  will repeat procalcitonin tomorrow, if trending up will likely start antibiotics for now monitor off    6  Mild CHANTALE       -  follows with Dr Saniya Sesay       -  will hold BiPAP- 15/11 cm of H2O until PNTX has resolved           History of Present Illness   Physician Requesting Consult: Bola*  Reason for Consult / Principal Problem:  Pneumothorax  Hx and PE limited by:  Nothing  Chief Complaint: "I feel a lot better  HPI: Miguel Ángel Do is a 65 y o   female who presented to [address] [place] [address] with complaints of shortness of breath  Patient has past medical history of depression, mi, tracheostomy, GERD, hypertension, and anxiety    Patient presented to [address] for shortness of breath and possibly some blood-tinged sputum  Upon arrival to ED patient was noted to have small right pneumothorax  Patient was then transferred to [facility] for chest tube placement  Pulmonary was consulted for pneumothorax  Upon examination patient reports that she is no longer experiencing any shortness of breath or hemoptysis  Patient reports she is still having some yellow sputum production  Patient also reports she has a tendency of feeling that food is getting stuck  Patient currently denying any fevers, chills, hemoptysis, headaches, night sweats, pleuritic chest pain, or palpitations  From a pulmonary standpoint, patient does not follow with a primary pulmonologist   Patient reports an approximate 1 pack per day 30 year smoking history  Patient is noted to have some emphysematous changes upon imaging  Patient is currently maintained on albuterol nebulizer q 6h p r n  Micheal [place] Patient is currently maintained on 10 L trach collar  Patient currently has no occupational exposures as she is on disability  Patient does report history of GERD in which she is maintained on Protonix  Patient denies any symptoms of seasonal allergies, or postnasal drip  Patient does report she has history of CHANTALE maintained on BiPAP  Patient does report history of dysphagia  Patient denies any recent acute exposures to dust, mold, asbestos, or silica  Inpatient consult to Pulmonology  Consult performed by: NEELAM Small  Consult ordered by: Margrette Crigler, PA-C          Review of Systems   Constitutional: Negative for chills and fever  HENT: Negative for ear pain and sore throat  Eyes: Negative for pain and visual disturbance  Respiratory: Positive for cough and shortness of breath  Negative for apnea, choking, chest tightness, wheezing and stridor  Cardiovascular: Negative for chest pain and palpitations     Gastrointestinal: Negative for abdominal pain and vomiting  Genitourinary: Negative for dysuria and hematuria  Musculoskeletal: Negative for arthralgias and back pain  Skin: Negative for color change and rash  Neurological: Negative for seizures and syncope  Psychiatric/Behavioral: Negative for agitation and behavioral problems  All other systems reviewed and are negative  Historical Information   Past Medical History:   Diagnosis Date    Anxiety     Aortic aneurysm ([address] )     Arthritis     Depression     Diabetes mellitus (HCC)     Fibromyalgia     GERD (gastroesophageal reflux disease)     GERD (gastroesophageal reflux disease)     H/O cardiovascular stress test 09/2018    no ischemia  EF 70%   H/O echocardiogram 01/2019    EF 60%  Mild LVH  trivial effusion   Hyperlipidemia     Hypertension     Migraines     Psychiatric disorder     anxiety    Uncontrolled hypertension 2/25/2015    Last Assessment & Plan:  BP today above goal <140/90, apparently asymptomatic  Prior BP elevations were attributed to not taking medication  Consider increased medication if persistent on f/u  Past Surgical History:   Procedure Laterality Date    BACK SURGERY      Lumbar epidural steroid injection    CARDIAC CATHETERIZATION N/A 10/22/2021    Procedure: Cardiac pci;  Surgeon: Mariluz Ahuja MD;  [facility]; Service: Cardiology    CARDIAC CATHETERIZATION  10/22/2021    Procedure: Cardiac catheterization;  Surgeon: Mariluz Ahuja MD;  [facility]; Service: Cardiology    CARDIAC CATHETERIZATION Left 10/27/2021    Procedure: Cardiac Left Heart Cath;  Surgeon: Eugene Murillo MD;  [facility]; Service: Cardiology    CARDIAC CATHETERIZATION N/A 10/27/2021    Procedure: Cardiac pci;  Surgeon: Eugene Murillo MD;  [facility];   Service: Cardiology    CARDIAC CATHETERIZATION N/A 10/28/2021    Procedure: Cardiac pci;  Surgeon: Lorenza Brenner DO;  [facility]; Service: Cardiology    CARPAL TUNNEL RELEASE Left      SECTION      CHOLECYSTECTOMY      COLONOSCOPY      incomplete    COLONOSCOPY      EYE SURGERY      HYSTERECTOMY      Total    OVARIAN CYST REMOVAL      PEG W/TRACHEOSTOMY PLACEMENT N/A 2021    Procedure: TRACHEOSTOMY WITH INSERTION PEG TUBE;  Surgeon: Guillermo Fung DO;  Location: [facility];  Service: General    TUBAL LIGATION      UPPER GASTROINTESTINAL ENDOSCOPY       Social History   Social History     Substance and Sexual Activity   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  Social History     Substance and Sexual Activity   Drug Use Yes    Types: Marijuana    Comment: medical; seldom use     Social History     Tobacco Use   Smoking Status Former Smoker    Packs/day: 1 00    Years: 30 00    Pack years: 30 00    Types: Cigarettes   Smokeless Tobacco Never Used     E-Cigarette/Vaping    E-Cigarette Use Never User      E-Cigarette/Vaping Substances    Nicotine No     THC No     CBD No     Flavoring No     Other No     Unknown No      Occupational History:  Disability    Family History:   Family History   Problem Relation Age of Onset    Hypertension Mother     Arthritis Mother     Diabetes Father     Other Sister         renal cell carcinoma    Diabetes Other     Factor V Leiden deficiency Other        Meds/Allergies   pertinent pulmonary meds have been reviewed    Allergies   Allergen Reactions    Metformin Diarrhea    Clonidine Rash     Patch        Objective   Vitals: There were no vitals taken for this visit  RA,There is no height or weight on file to calculate BMI    No intake or output data in the 24 hours ending 22 1326  Invasive Devices  Report    Peripheral Intravenous Line  Duration           Peripheral IV 07/15/22 Left Antecubital 4 days          Airway  Duration           Surgical Airway Shiley Fenestrated 139 days                Physical Exam  Constitutional: General: She is not in acute distress  Appearance: Normal appearance  She is normal weight  She is not ill-appearing  HENT:      Head: Normocephalic and atraumatic  Nose: Nose normal  No congestion or rhinorrhea  Mouth/Throat:      Mouth: Mucous membranes are dry  Pharynx: No oropharyngeal exudate or posterior oropharyngeal erythema  Cardiovascular:      Rate and Rhythm: Normal rate and regular rhythm  Pulses: Normal pulses  Heart sounds: Normal heart sounds  No murmur heard  No friction rub  No gallop  Pulmonary:      Effort: Pulmonary effort is normal  No tachypnea, bradypnea, accessory muscle usage or respiratory distress  Breath sounds: Decreased air movement present  No stridor  Decreased breath sounds and rhonchi present  No wheezing or rales  Chest:      Chest wall: No tenderness  Abdominal:      General: Abdomen is flat  Bowel sounds are normal  There is no distension  Palpations: Abdomen is soft  There is no mass  Musculoskeletal:         General: No swelling or tenderness  Normal range of motion  Cervical back: Normal range of motion  No rigidity or tenderness  Skin:     General: Skin is warm and dry  Coloration: Skin is not jaundiced or pale  Neurological:      General: No focal deficit present  Mental Status: She is alert and oriented to person, place, and time  Mental status is at baseline     Psychiatric:         Mood and Affect: Mood normal          Behavior: Behavior normal          Lab Results:   I have personally reviewed pertinent lab results, CBC:   Lab Results   Component Value Date    WBC 12 52 (H) 07/19/2022    HGB 10 2 (L) 07/19/2022    HCT 35 1 07/19/2022    MCV 78 (L) 07/19/2022     (H) 07/19/2022    MCH 22 7 (L) 07/19/2022    MCHC 29 1 (L) 07/19/2022    RDW 16 6 (H) 07/19/2022    MPV 10 2 07/19/2022    NRBC 0 07/19/2022   , CMP:   Lab Results   Component Value Date    SODIUM 131 (L) 07/19/2022    K 5 5 (H) 07/19/2022    CL 92 (L) 07/19/2022    CO2 29 07/19/2022    BUN 47 (H) 07/19/2022    CREATININE 1 64 (H) 07/19/2022    CALCIUM 9 3 07/19/2022    EGFR 34 07/19/2022       Imaging Studies: I have personally reviewed pertinent films in PACS     Chest x-ray-small right pneumothorax    EKG, Pathology, and Other Studies: I have personally reviewed pertinent films in PACS     10/30/2021- echo EF 40%    Pulmonary Results (PFTs, PSG): I have personally reviewed pertinent films in PACS     No PFTs recorded    VTE Prophylaxis: Sequential compression device (Venodyne)     Code Status: Prior    None    Portions of the record may have been created with voice recognition software  Occasional wrong word or "sound a like" substitutions may have occurred due to the inherent limitations of voice recognition software  Read the chart carefully and recognize, using context, where substitutions have occurred

## 2022-07-19 NOTE — ASSESSMENT & PLAN NOTE
Patient with new onset blood-tinged secretions from tracheostomy 7/17  · Patient frequently trying to clear secretions by herself  · Monitor hgb closely, stable  · Eliquis held x1 days

## 2022-07-19 NOTE — PROGRESS NOTES
Yared U  67   Progress Note - Benjamín Mejias 1965, 65 y o  female MRN: [id number]  Unit/Bed#: -01 Encounter: [id number]  Primary Care Provider: Oumou Muhammad MD   Date and time admitted to hospital: 7/15/2022  2:23 AM    * Primary spontaneous pneumothorax  Assessment & Plan  Patient initially presented to the hospital with shortness of breath chest pain, found to have an acute CHF exacerbation now resolved  Patient with continued coughing and repeat CXR on 07/18 that revealed new right-sided pneumothorax  · Patient will require chest tube decompression, unfortunately that is not available at this campus and patient will require transfer to higher level of care  · She is currently hemodynamically stable, on 100% FiO2  · Plan of care discussed with the patient at the bedside, her son was updated by phone  · Pt to Tx to [facility]-> Awaiting transportation    Shortness of breath  Assessment & Plan  With multiple ED visits and admissions for same   Again with usual presentation presenting for suctioning  · Multiple contributing factors including known medical non-compliance, HFrEF, severe anxiety, chronic hypoxic respiratory failure with tracheostomy, CAD  · Suspect mild decompensated HF on presentation, which is now resolved and pneumo contributory   · CXR negative,   · S/p 1 x dose of IV lasix on admission  · Trach care, suctioning, supp O2   · Fluid restriction, I&Os, daily weights   · Mild persistent leukocytosis noted on CBC, no acute infectious process noted, pt is non-toxic appearing and afebrile  · Repeat CXR-> Stable  · Awaiting Transfer      Acute on chronic HFrEF (heart failure with reduced ejection fraction) (HCC)  Assessment & Plan  Wt Readings from Last 3 Encounters:   07/19/22 78 2 kg (172 lb 6 4 oz)   06/29/22 81 7 kg (180 lb 3 2 oz)   06/21/22 84 kg (185 lb 3 oz)     · Noted to have very mild volume overload on admission with trace edema, rales on exam  · S/p 1 x dose of IV lasix 80 mg   · Patient appears to be euvolemic   · BNP only 472  · Echo 2022: LVEF 50%, moderate hypokinesis of inferior and anterolateral wall  Normal diastolic function  · Diuretic: Bumex 2g b i d , Spironolactone 100mg daily  · Hold in the setting of Worsening renal function  · Monitor Diuretic need daily  · C/w Daily weights, I&Os net neg >4 L  · Na/fluid restriction         MODESTO (acute kidney injury) ([facility] [address] )  Assessment & Plan  · Patient with  MODESTO, (crt> 0 3 overnight) was 1 3, on multiple agents   · Patient with worsening MODESTO-> Crt now 1 64 ()  · Discontinue Bumex 2 mg, patient euvolemic-> Re-evaluate diuretic need daily  · Continue to Hold losartan 50 mg  · Spironolactone decreased to 50 mg-> Consider holding tomorrow   · Monitor blood pressure  · Repeat BMP at 1200    Hyperkalemia  Assessment & Plan  · Likely in the setting of Acute renal dysfunction and spirolactone use in combination of daily potassium replenishment  · Will hold spirolactone   · Of note, patient did receive am dose of Potassium (40 mEq) with a Potassium of 5 5  · Will give Kayexalate now  · Repeat BMP at 1200  · Place on telemetry  · Low potassium diet    Hyponatremia  Assessment & Plan  · Likely psuedo hyponatremia in the setting of hyperglycemia  · Na: 131  B-> Corrected Na to 135  · Adjust insulin as noted under DM2    Hypomagnesemia  Assessment & Plan  · POA  · Monitor/Replete PRN    Hemoptysis  Assessment & Plan  Patient with new onset blood-tinged secretions from tracheostomy   · Patient frequently trying to clear secretions by herself  · Monitor hgb closely, stable  · Eliquis held x1 days    Chest pain  Assessment & Plan  · Pt presented with squeezing centralized chest pain at rest  Now resolved    · CXR on admission without any acute cardiopulmonary findings  · CXR  with new right-sided pneumothorax  · Had cardiac cath in  with 90% occlusion of mid cx s/p PATRICA  · Recent echo from 2022 shows improved EF   · RALPH 3, troponins x 3 negative  · EKG negative for ischemia  · Telemetry in place, pt with lifevest   · Follows with cardiology as an outpatient, recommend close follow up once medically stable for discharge     Type 2 diabetes mellitus treated with insulin [facility])  Assessment & Plan  Lab Results   Component Value Date    HGBA1C 12 7 (H) 05/22/2022       Recent Labs     07/18/22  1148 07/18/22  1723 07/18/22  2141 07/19/22  0747   POCGLU 197* 277* 261* 309*       Blood Sugar Average: Last 72 hrs:  (P) 455 [id number]   · Uncontrolled per A1c  Presented with hyperglycemia , fortunately non-acidotic, no AG, no DKA  · Home regimen: Recently updated with last admission to Lantus 16U hs, Humalog 4U t i d  w/ meals   · Patient with Notable Hyperglycemia  · Will increase evening Lantus to 23 units qHs (7/19)  · C/w humalog 9 units TID with meals (7/16)  · C/w SSI coverage-> Increased to Algorithm 4 (7/19)  · QID glucose checks   · Hypoglycemia protocol  · Consistent carb diet   · Monitor and adjust regimen as needed     Chronic hypoxemic respiratory failure (HCC)  Assessment & Plan  · W/ tracheostomy   Baseline FiO2 35%  · Continue with trach care  · Recommend ENT follow-up for updated trach either inpatient vs outpatient   · Continue atrovent, xopenex, p r n  albuterol    Microcytic anemia  Assessment & Plan  · Hgb 10 2, microcytic  · Baseline appears to be 9-10 on review   · Iron panel here with iron stores of 29, compatible with NERI  · C/w PO ferrous sulfate daily   · Trend hgb    A-fib (HCC)  Assessment & Plan  · NSR on admission   · Currently Home medication:  · Amiodarone 100 mg daily and Toprol XL 50 mg BID for rate control   · Continue Eliquis 5 mg BID for a/c  · Pt with mild blood from trach 7/17, has since resolved    Hypertension  Assessment & Plan  · BP appears stable on review   · Home Medication:  · losartan 50 mg BID, Toprol XL 50 mg BID, Bumex 2 mg BID and Spironolactone 100 mg daily   · Home Losartan held in the setting of MODESTO  · Patient with worsening renal function-> Diuretics on hold  · Monitor BP closely    History of Ventricular tachycardia ([address] )  Assessment & Plan  · Patient has a history of cardiac arrest, currently still has a LifeVest (Off, At bedside)  · Continue Home medication:  · BB, Brilinta, amiodarone and Eliquis  · Follows with cardiology as an outpatient, last outpatient appointment in the beginning of June of this year   · Pt advised to follow up with EP for possible ICD placement  · Patient will remain on telemetry monitoring    Anxiety  Assessment & Plan  · Continue home medication:  · Ativan p r n  and lexapro 10 mg daily   · Mood appears stable on evaluation today         VTE Pharmacologic Prophylaxis: VTE Score: 3 Moderate Risk (Score 3-4) - Pharmacological DVT Prophylaxis Ordered: apixaban (Eliquis)  Patient Centered Rounds: I performed bedside rounds with nursing staff today  Discussions with Specialists or Other Care Team Provider: Case Management    Education and Discussions with Family / Patient: Attempted to update  (son) via phone  Left voicemail  Time Spent for Care: 60 minutes  More than 50% of total time spent on counseling and coordination of care as described above  Current Length of Stay: 4 day(s)  Current Patient Status: Inpatient   Certification Statement: The patient will continue to require additional inpatient hospital stay due to spontaseous pneumo requring CT palcement, worsening renal function/electrolyte disturbances requring medications adjustment  Discharge Plan: Anticipate discharge in >72 hrs to Patient to be transferred to Acute Care hospital    Code Status: Level 1 - Full Code    Subjective:   Patient awaiting transport to [facility] for CT placement  Patient complaining of mild trach pain  Patient denies any chest pain, shortness of breath, abdominal pain, nausea, vomiting or diarrhea      Objective:     Vitals:   Temp (24hrs), Av 5 °F (36 4 °C), Min:97 2 °F (36 2 °C), Max:98 °F (36 7 °C)    Temp:  [97 2 °F (36 2 °C)-98 °F (36 7 °C)] 98 °F (36 7 °C)  HR:  [61-68] 68  Resp:  [16-17] 17  BP: ()/(63-83) 121/83  SpO2:  [90 %-100 %] 100 %  Body mass index is 25 46 kg/m²  Input and Output Summary (last 24 hours): Intake/Output Summary (Last 24 hours) at 2022 0958  Last data filed at 2022 2300  Gross per 24 hour   Intake --   Output 350 ml   Net -350 ml       Physical Exam:   Physical Exam  Vitals and nursing note reviewed  Constitutional:       General: She is not in acute distress  Appearance: She is ill-appearing (Chronically)  HENT:      Head: Normocephalic  Nose: Nose normal  No congestion  Mouth/Throat:      Mouth: Mucous membranes are moist       Pharynx: Oropharynx is clear  Cardiovascular:      Rate and Rhythm: Normal rate and regular rhythm  Pulses: Normal pulses  Heart sounds: Normal heart sounds  No murmur heard  Pulmonary:      Effort: Pulmonary effort is normal  No respiratory distress  Comments: [place] present  Abdominal:      General: Bowel sounds are normal  There is no distension  Palpations: Abdomen is soft  Tenderness: There is no abdominal tenderness  Musculoskeletal:         General: Normal range of motion  Cervical back: Normal range of motion  Right lower leg: No edema  Left lower leg: No edema  Skin:     General: Skin is warm and dry  Capillary Refill: Capillary refill takes less than 2 seconds  Neurological:      Mental Status: She is alert and oriented to person, place, and time  Mental status is at baseline  Motor: Weakness (Generalized) present  Psychiatric:         Mood and Affect: Mood normal  Affect is flat  Speech: Speech normal          Behavior: Behavior is cooperative           Judgment: Judgment normal           Additional Data:     Labs:  Results from last 7 days   Lab Units 22  0510   WBC Thousand/uL 12 52*   HEMOGLOBIN g/dL 10 2*   HEMATOCRIT % 35 1   PLATELETS Thousands/uL 537*   NEUTROS PCT % 63   LYMPHS PCT % 16   MONOS PCT % 8   EOS PCT % 11*     Results from last 7 days   Lab Units 07/19/22  0510 07/18/22  0513 07/16/22  0444 07/15/22  0334   SODIUM mmol/L 131* 136   < > 134*   POTASSIUM mmol/L 5 5* 4 3   < > 3 5   CHLORIDE mmol/L 92* 93*   < > 94*   CO2 mmol/L 29 32   < > 30   BUN mg/dL 47* 32*   < > 12   CREATININE mg/dL 1 64* 1 30   < > 0 83   ANION GAP mmol/L 10 11   < > 10   CALCIUM mg/dL 9 3 9 6   < > 9 1   ALBUMIN g/dL  --  3 2*   < > 3 5   TOTAL BILIRUBIN mg/dL  --   --   --  0 51   ALK PHOS U/L  --   --   --  95   ALT U/L  --   --   --  3*   AST U/L  --   --   --  4*   GLUCOSE RANDOM mg/dL 262* 194*   < > 452*    < > = values in this interval not displayed           Results from last 7 days   Lab Units 07/19/22  0747 07/18/22  2141 07/18/22  1723 07/18/22  1148 07/18/22  0651 07/17/22  2051 07/17/22  1550 07/17/22  1134 07/17/22  0742 07/16/22  2057 07/16/22  1555 07/16/22  1119   POC GLUCOSE mg/dl 309* 261* 277* 197* 267* 209* 219* 295* 187* 261* 361* 366*         Results from last 7 days   Lab Units 07/19/22  0510 07/18/22  0843   PROCALCITONIN ng/ml 0 27* 0 24       Lines/Drains:  Invasive Devices  Report    Peripheral Intravenous Line  Duration           Peripheral IV 07/15/22 Left Antecubital 4 days          Airway  Duration           Surgical Airway Shiley Fenestrated 139 days                  Telemetry:  Telemetry Orders (From admission, onward)             24 Hour Telemetry Monitoring  Continuous x 24 Hours (Telem)        References:    Telemetry Guidelines   Question:  Reason for 24 Hour Telemetry  Answer:  Metabolic/Electrolyte Disturbance with High Probability of Dysrhythmia (K level <3 or >6, or KCL infusion >=10mEq/hr)        LifeVest Patient: Continuous Telemetry Monitoring during hospitalization (non-expiring)  Continuous LifeVest Telemetry Monitoring        References: LifeVest Policy                 Telemetry Reviewed: Normal Sinus Rhythm  Indication for Continued Telemetry Use: Lifevest (remains on tele entire hospital stay)             Imaging: Reviewed radiology reports from this admission including: chest xray    Recent Cultures (last 7 days):         Last 24 Hours Medication List:   Current Facility-Administered Medications   Medication Dose Route Frequency Provider Last Rate    acetaminophen  650 mg Oral Q6H PRN Pandi Todhe, DO      albuterol  2 5 mg Nebulization Q4H PRN Leda Rodriguez, DONALD      amiodarone  100 mg Oral Daily With Breakfast Leda Rodriguez PA-C      apixaban  5 mg Oral BID Benita Cochran, DONALD      atorvastatin  40 mg Oral Daily With [employer] Jeremías Rodriguez PA-C      escitalopram  10 mg Oral Daily Leda Rodriguez, DONALD      ferrous sulfate  325 mg Oral Daily With Breakfast ODNALD Christie      guaiFENesin  1,200 mg Oral Q12H [address] Pandi Tolilian, DO      insulin glargine  23 Units Subcutaneous HS NEELAM Mae      insulin lispro  2-12 Units Subcutaneous TID AC NEELAM Mae      insulin lispro  2-12 Units Subcutaneous HS NEELAM Ferraro      insulin lispro  9 Units Subcutaneous TID With Meals DONALD Christie      ipratropium  0 5 mg Nebulization TID Para Mater, DONALD      levalbuterol  1 25 mg Nebulization TID Mayi Tolilian, DO      LORazepam  0 5 mg Oral Q8H PRN Leda Rodriguez PA-C      metoprolol succinate  50 mg Oral Q12H Leda Rodriguez PA-C      pantoprazole  40 mg Oral Early Morning Leda Rodriguez PA-C      pregabalin  75 mg Oral Daily Leda Rodriguez PA-C      sodium polystyrene  15 g Oral Once NEELAM Ferraro      ticagrelor  90 mg Oral Q12H Leda Rodriguez PA-C          Today, Patient Was Seen By: NEELAM Ferraro    **Please Note: This note may have been constructed using a voice recognition system  **

## 2022-07-19 NOTE — ASSESSMENT & PLAN NOTE
Patient initially presented to the hospital with shortness of breath chest pain, found to have an acute CHF exacerbation now resolved  Patient with continued coughing and repeat CXR on 07/18 that revealed new right-sided pneumothorax    · Patient will require chest tube decompression, unfortunately that is not available at this campus and patient will require transfer to higher level of care  · She is currently hemodynamically stable, on 100% FiO2  · Plan of care discussed with the patient at the bedside, her son was updated by phone  · Pt to Tx to [facility]-> Awaiting transportation

## 2022-07-19 NOTE — ASSESSMENT & PLAN NOTE
· Patient noted on imaging to have small pneumothorax at [facility], subsequently transferred here to have placement of chest tube  · Repeat chest x-ray done now showed stable    · No reports of pain  · Consult pulmonology to follow her here as well; will do serial imaging with IR consult tomorrow to place chest tube if needed

## 2022-07-19 NOTE — ASSESSMENT & PLAN NOTE
· Likely psuedo hyponatremia in the setting of hyperglycemia  · Na: 131   B-> Corrected Na to 135  · Adjust insulin as noted under DM2

## 2022-07-19 NOTE — ASSESSMENT & PLAN NOTE
Lab Results   Component Value Date    HGBA1C 12 7 (H) 05/22/2022       Recent Labs     07/18/22  1148 07/18/22  1723 07/18/22  2141 07/19/22  0747   POCGLU 197* 277* 261* 309*       Blood Sugar Average: Last 72 hrs:  (P) 384 [id number]   · Uncontrolled per A1c   Presented with hyperglycemia , fortunately non-acidotic, no AG, no DKA  · Home regimen: Recently updated with last admission to Lantus 16U hs, Humalog 4U t i d  w/ meals   · Patient with Notable Hyperglycemia  · Will increase evening Lantus to 23 units qHs (7/19)  · C/w humalog 9 units TID with meals (7/16)  · C/w SSI coverage-> Increased to Algorithm 4 (7/19)  · QID glucose checks   · Hypoglycemia protocol  · Consistent carb diet   · Monitor and adjust regimen as needed

## 2022-07-19 NOTE — ASSESSMENT & PLAN NOTE
· W/ tracheostomy   Baseline FiO2 35%  · Continue with trach care  · Recommend ENT follow-up for updated trach either inpatient vs outpatient   · Continue atrovent, xopenex, p r n  albuterol

## 2022-07-19 NOTE — ASSESSMENT & PLAN NOTE
With multiple ED visits and admissions for same   Again with usual presentation presenting for suctioning  · Multiple contributing factors including known medical non-compliance, HFrEF, severe anxiety, chronic hypoxic respiratory failure with tracheostomy, CAD  · Suspect mild decompensated HF on presentation, which is now resolved and pneumo contributory   · CXR negative,   · S/p 1 x dose of IV lasix on admission  · Trach care, suctioning, supp O2   · Fluid restriction, I&Os, daily weights   · Mild persistent leukocytosis noted on CBC, no acute infectious process noted, pt is non-toxic appearing and afebrile  · Repeat CXR-> Stable  · Awaiting Transfer

## 2022-07-19 NOTE — ASSESSMENT & PLAN NOTE
Lab Results   Component Value Date    EGFR 34 07/19/2022    EGFR 45 07/18/2022    EGFR 62 07/17/2022    CREATININE 1 64 (H) 07/19/2022    CREATININE 1 30 07/18/2022    CREATININE 1 01 07/17/2022   Creatinine today elevated at 1 64 with baseline being 0 9-1, it was noted to be rising

## 2022-07-19 NOTE — H&P (VIEW-ONLY)
Consultation - Pulmonary Medicine   Edvin Yu 65 y o  female MRN: [id number]  Unit/Bed#: S -01 Encounter: [id number]      Assessment/Plan:    1  Acute pulmonary insufficiency on chronic hypoxic respiratory failure       -  currently on home O2 requirements of 10 L trach collar- 94%       -  continue maintain saturations greater than 89%       -  pulmonary toileting:  Deep breathing cough, OOB as tolerated, IS Q 1 hr    2  Primary spontaneous pneumothorax       -  etiology unclear possibly secondary to coughing may have had a bleb       -  IR consult for non emergent chest tube placement       -  will repeat chest x-ray at 2200 and tomorrow a m        -  If PNTX decreases in size by tomorrow am will DC IR consult    3  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        -  1/13/2022- tracheotomy placed        -  currently maintained 6 Shiley uncuffed        -  will continue trach care/suction as needed    4  Suspected COPD of unknown severity without acute exacerbation        -  Inpatient:  Albuterol as needed        -  home regimen:  Albuterol q 6h p r n         -  no indication for maintenance or steroid at this time    5  Suspected dysphagia       -  VBS scheduled Thursday       -  aspiration precaution       -  will repeat procalcitonin tomorrow, if trending up will likely start antibiotics for now monitor off    6  Mild CHANTALE       -  follows with Dr Nae Rogers       -  will hold BiPAP- 15/11 cm of H2O until PNTX has resolved           History of Present Illness   Physician Requesting Consult: Mikhail Nix*  Reason for Consult / Principal Problem:  Pneumothorax  Hx and PE limited by:  Nothing  Chief Complaint: "I feel a lot better  HPI: Edvin Yu is a 65 y o   female who presented to [address] [place] [address] with complaints of shortness of breath  Patient has past medical history of depression, mi, tracheostomy, GERD, hypertension, and anxiety    Patient [facility] for shortness of breath and possibly some blood-tinged sputum  Upon arrival to ED patient was noted to have small right pneumothorax  Patient was then transferred to [facility] for chest tube placement  Pulmonary was consulted for pneumothorax  Upon examination patient reports that she is no longer experiencing any shortness of breath or hemoptysis  Patient reports she is still having some yellow sputum production  Patient also reports she has a tendency of feeling that food is getting stuck  Patient currently denying any fevers, chills, hemoptysis, headaches, night sweats, pleuritic chest pain, or palpitations  From a pulmonary standpoint, patient does not follow with a primary pulmonologist   Patient reports an approximate 1 pack per day 30 year smoking history  Patient is noted to have some emphysematous changes upon imaging  Patient is currently maintained on albuterol nebulizer q 6h p r n  [place] Patient is currently maintained on 10 L trach collar  Patient currently has no occupational exposures as she is on disability  Patient does report history of GERD in which she is maintained on Protonix  Patient denies any symptoms of seasonal allergies, or postnasal drip  Patient does report she has history of CHANTALE maintained on BiPAP  Patient does report history of dysphagia  Patient denies any recent acute exposures to dust, mold, asbestos, or silica  Inpatient consult to Pulmonology  Consult performed by: NEELAM Yanez  Consult ordered by: Miky Santos PA-C          Review of Systems   Constitutional: Negative for chills and fever  HENT: Negative for ear pain and sore throat  Eyes: Negative for pain and visual disturbance  Respiratory: Positive for cough and shortness of breath  Negative for apnea, choking, chest tightness, wheezing and stridor  Cardiovascular: Negative for chest pain and palpitations     Gastrointestinal: Negative for abdominal pain and vomiting  Genitourinary: Negative for dysuria and hematuria  Musculoskeletal: Negative for arthralgias and back pain  Skin: Negative for color change and rash  Neurological: Negative for seizures and syncope  Psychiatric/Behavioral: Negative for agitation and behavioral problems  All other systems reviewed and are negative  Historical Information   Past Medical History:   Diagnosis Date    Anxiety     Aortic aneurysm ([facility] [address] )     Arthritis     Depression     Diabetes mellitus (HCC)     Fibromyalgia     GERD (gastroesophageal reflux disease)     GERD (gastroesophageal reflux disease)     H/O cardiovascular stress test 09/2018    no ischemia  EF 70%   H/O echocardiogram 01/2019    EF 60%  Mild LVH  trivial effusion   Hyperlipidemia     Hypertension     Migraines     Psychiatric disorder     anxiety    Uncontrolled hypertension 2/25/2015    Last Assessment & Plan:  BP today above goal <140/90, apparently asymptomatic  Prior BP elevations were attributed to not taking medication  Consider increased medication if persistent on f/u  Past Surgical History:   Procedure Laterality Date    BACK SURGERY      Lumbar epidural steroid injection    CARDIAC CATHETERIZATION N/A 10/22/2021    Procedure: Cardiac pci;  Surgeon: Bishnu Tony MD;  [facility]; Service: Cardiology    CARDIAC CATHETERIZATION  10/22/2021    Procedure: Cardiac catheterization;  Surgeon: Bishnu Tony MD;  [facility]; Service: Cardiology    CARDIAC CATHETERIZATION Left 10/27/2021    Procedure: Cardiac Left Heart Cath;  Surgeon: Nichole Ghotra MD;  [facility]; Service: Cardiology    CARDIAC CATHETERIZATION N/A 10/27/2021    Procedure: Cardiac pci;  Surgeon: Nichole Ghotra MD;  [facility];   Service: Cardiology    CARDIAC CATHETERIZATION N/A 10/28/2021    Procedure: Cardiac pci;  Surgeon: Paulina Mckeon DO;  [facility]; Service: Cardiology    CARPAL TUNNEL RELEASE Left      SECTION      CHOLECYSTECTOMY      COLONOSCOPY      incomplete    COLONOSCOPY      EYE SURGERY      HYSTERECTOMY      Total    OVARIAN CYST REMOVAL      PEG W/TRACHEOSTOMY PLACEMENT N/A 2021    Procedure: TRACHEOSTOMY WITH INSERTION PEG TUBE;  Surgeon: Bre Bauer To, DO;  Location: [facility];  Service: General    TUBAL LIGATION      UPPER GASTROINTESTINAL ENDOSCOPY       Social History   Social History     Substance and Sexual Activity   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  Social History     Substance and Sexual Activity   Drug Use Yes    Types: Marijuana    Comment: medical; seldom use     Social History     Tobacco Use   Smoking Status Former Smoker    Packs/day: 1 00    Years: 30 00    Pack years: 30 00    Types: Cigarettes   Smokeless Tobacco Never Used     E-Cigarette/Vaping    E-Cigarette Use Never User      E-Cigarette/Vaping Substances    Nicotine No     THC No     CBD No     Flavoring No     Other No     Unknown No      Occupational History:  Disability    Family History:   Family History   Problem Relation Age of Onset    Hypertension Mother     Arthritis Mother     Diabetes Father     Other Sister         renal cell carcinoma    Diabetes Other     Factor V Leiden deficiency Other        Meds/Allergies   pertinent pulmonary meds have been reviewed    Allergies   Allergen Reactions    Metformin Diarrhea    Clonidine Rash     Patch        Objective   Vitals: There were no vitals taken for this visit  RA,There is no height or weight on file to calculate BMI    No intake or output data in the 24 hours ending 22 1326  Invasive Devices  Report    Peripheral Intravenous Line  Duration           Peripheral IV 07/15/22 Left Antecubital 4 days          Airway  Duration           Surgical Airway Shiley Fenestrated 139 days                Physical Exam  Constitutional: General: She is not in acute distress  Appearance: Normal appearance  She is normal weight  She is not ill-appearing  HENT:      Head: Normocephalic and atraumatic  Nose: Nose normal  No congestion or rhinorrhea  Mouth/Throat:      Mouth: Mucous membranes are dry  Pharynx: No oropharyngeal exudate or posterior oropharyngeal erythema  Cardiovascular:      Rate and Rhythm: Normal rate and regular rhythm  Pulses: Normal pulses  Heart sounds: Normal heart sounds  No murmur heard  No friction rub  No gallop  Pulmonary:      Effort: Pulmonary effort is normal  No tachypnea, bradypnea, accessory muscle usage or respiratory distress  Breath sounds: Decreased air movement present  No stridor  Decreased breath sounds and rhonchi present  No wheezing or rales  Chest:      Chest wall: No tenderness  Abdominal:      General: Abdomen is flat  Bowel sounds are normal  There is no distension  Palpations: Abdomen is soft  There is no mass  Musculoskeletal:         General: No swelling or tenderness  Normal range of motion  Cervical back: Normal range of motion  No rigidity or tenderness  Skin:     General: Skin is warm and dry  Coloration: Skin is not jaundiced or pale  Neurological:      General: No focal deficit present  Mental Status: She is alert and oriented to person, place, and time  Mental status is at baseline     Psychiatric:         Mood and Affect: Mood normal          Behavior: Behavior normal          Lab Results:   I have personally reviewed pertinent lab results, CBC:   Lab Results   Component Value Date    WBC 12 52 (H) 07/19/2022    HGB 10 2 (L) 07/19/2022    HCT 35 1 07/19/2022    MCV 78 (L) 07/19/2022     (H) 07/19/2022    MCH 22 7 (L) 07/19/2022    MCHC 29 1 (L) 07/19/2022    RDW 16 6 (H) 07/19/2022    MPV 10 2 07/19/2022    NRBC 0 07/19/2022   , CMP:   Lab Results   Component Value Date    SODIUM 131 (L) 07/19/2022    K 5 5 (H) 07/19/2022    CL 92 (L) 07/19/2022    CO2 29 07/19/2022    BUN 47 (H) 07/19/2022    CREATININE 1 64 (H) 07/19/2022    CALCIUM 9 3 07/19/2022    EGFR 34 07/19/2022       Imaging Studies: I have personally reviewed pertinent films in PACS     Chest x-ray-small right pneumothorax    EKG, Pathology, and Other Studies: I have personally reviewed pertinent films in PACS     10/30/2021- echo EF 40%    Pulmonary Results (PFTs, PSG): I have personally reviewed pertinent films in PACS     No PFTs recorded    VTE Prophylaxis: Sequential compression device (Venodyne)     Code Status: Prior    None    Portions of the record may have been created with voice recognition software  Occasional wrong word or "sound a like" substitutions may have occurred due to the inherent limitations of voice recognition software  Read the chart carefully and recognize, using context, where substitutions have occurred

## 2022-07-19 NOTE — ASSESSMENT & PLAN NOTE
Wt Readings from Last 3 Encounters:   07/19/22 78 2 kg (172 lb 6 4 oz)   06/29/22 81 7 kg (180 lb 3 2 oz)   06/21/22 84 kg (185 lb 3 oz)     · Noted to have very mild volume overload on admission with trace edema, rales on exam  · S/p 1 x dose of IV lasix 80 mg   · Patient appears to be euvolemic   · BNP only 472  · Echo 2/2022: LVEF 50%, moderate hypokinesis of inferior and anterolateral wall   Normal diastolic function  · Diuretic: Bumex 2g b i d , Spironolactone 100mg daily  · Hold in the setting of Worsening renal function  · Monitor Diuretic need daily  · C/w Daily weights, I&Os net neg >4 L  · Na/fluid restriction

## 2022-07-19 NOTE — ASSESSMENT & PLAN NOTE
Lab Results   Component Value Date    HGBA1C 12 7 (H) 05/22/2022     Recent Labs     07/18/22  1148 07/18/22  1723 07/18/22  2141 07/19/22  0747   POCGLU 197* 277* 261* 309*     Blood Sugar Average: Last 72 hrs:  · very poorly controlled based on most recent A1c  · Monitor on Accu-Cheks with basal bolus regimen and adjust as needed  · ADA diet

## 2022-07-19 NOTE — ASSESSMENT & PLAN NOTE
· Patient reportedly presented to the hospital initially with shortness of breath and chest pain  She carries multiple significant underlying diagnoses including COPD, congestive heart failure, CHANTALE; She recently had COVID 2 months ago and has a tracheostomy   Now reports no chest pain/shortness of breath  · Avoid BiPAP for now given PTX  · Very minimal procal elevation and mild leukocytosis- will observe off antibiotics  · Pulmonology consult appreciated, case d/w them

## 2022-07-19 NOTE — H&P
[address] [facility] [place] 1965, 65 y o  female MRN: [id number]  Unit/Bed#: S -01 Encounter: [id number]  Primary Care Provider: Annmarie Fields MD   Date and time admitted to hospital: 7/19/2022 11:33 AM    * Primary spontaneous pneumothorax  Assessment & Plan  · Patient noted on imaging to have small pneumothorax at [facility], subsequently transferred here to have placement of chest tube  · Repeat chest x-ray done now showed stable  · No reports of pain  · Consult pulmonology to follow her here as well; will do serial imaging with IR consult tomorrow to place chest tube if needed    Acute kidney injury superimposed on chronic kidney disease stage 3 [facility])  Assessment & Plan  Lab Results   Component Value Date    EGFR 34 07/19/2022    EGFR 45 07/18/2022    EGFR 62 07/17/2022    CREATININE 1 64 (H) 07/19/2022    CREATININE 1 30 07/18/2022    CREATININE 1 01 07/17/2022   Creatinine today elevated at 1 64 with baseline being 0 9-1, it was noted to be rising    Hyperkalemia  Assessment & Plan  · Noted on lab work this morning  Likely due to receiving supplemental potassium, spironolactone, and ARB  Dose of Kayexalate was ordered before transfer   · Recheck BMP immediately  If needed will place patient on low potassium diet and hold ARBs/supplemental K/Aldactone    Shortness of breath  Assessment & Plan  · Patient reportedly presented to the hospital initially with shortness of breath and chest pain  She carries multiple significant underlying diagnoses including COPD, congestive heart failure, CHANTALE; She recently had COVID 2 months ago and has a tracheostomy   Now reports no chest pain/shortness of breath  · Avoid BiPAP for now given PTX  · Very minimal procal elevation and mild leukocytosis- will observe off antibiotics  · Pulmonology consult appreciated, case d/w them    Acute on chronic HFrEF (heart failure with reduced ejection fraction) ([facility] [address] )  Assessment & Plan  Wt Readings from Last 3 Encounters:   07/19/22 78 2 kg (172 lb 6 4 oz)   06/29/22 81 7 kg (180 lb 3 2 oz)   06/21/22 84 kg (185 lb 3 oz)   · EF noted to be 50% on last echocardiogram earlier this year  · Received IV lasix upon arrival there for shortness of breath  · Appears euvolemic at this time; would hold diuretics now given MODESTO        Tracheostomy in place [facility])  Assessment & Plan  · Trach placed in the context of cardiac arrest and prolonged hospitalization November 2021  Apparently patient comes to the hospital frequently for suctioning  Had some bleeding from the trach at [facility], now resolved  Also had trach replaced there  · Per d/w speech therapy, will do video barium swallow as it was due to be done for sense of food getting stuck    Type 2 diabetes mellitus with hyperglycemia [facility])  Assessment & Plan  Lab Results   Component Value Date    HGBA1C 12 7 (H) 05/22/2022     Recent Labs     07/18/22  1148 07/18/22  1723 07/18/22  2141 07/19/22  0747   POCGLU 197* 277* 261* 309*     Blood Sugar Average: Last 72 hrs:  · very poorly controlled based on most recent A1c  · Monitor on Accu-Cheks with basal bolus regimen and adjust as needed  · ADA diet    A-fib (HCC)  Assessment & Plan  · On anticoagulation with Eliquis; caution due to reports of recent bleeding from tracheostomy  · On amiodarone and Toprol    CAD (coronary artery disease)  Assessment & Plan  · History of MI oct 2021  · On beta-blocker, Brilinta  · Apparently complained of chest pain on admission to [place] but troponins were negative and EKG was nonischemic    History of Cardiac arrest [facility])  Assessment & Plan  · Patient apparently with history of VT cardiac arrest November 2021  Now with life vest--must be on telemetry if LifeVest off  Unclear why she has not yet followed up with EP to have ICD    Defer to outpatient cardiology    VTE Pharmacologic Prophylaxis: VTE Score: 3 eliquis  Code Status: Level 1 - Full Code   Discussion with family: Anticipated Length of Stay: Patient will be admitted on an inpatient basis with an anticipated length of stay of greater than 2 midnights secondary to serial CXR, possible need for chest tube  Total Time for Visit, including Counseling / Coordination of Care: 60 minutes Greater than 50% of this total time spent on direct patient counseling and coordination of care  Case was discussed with my attending, pulmonology and Interventional    Chief Complaint: transferred for chest tube    History of Present Illness:  Parisa Guerra is a 65 y o  female with a PMH of COPD, coronary artery disease with prior MI and cardiac arrest in 2021 resulting in placement of tracheostomy and life vest, congestive heart failure, chronic kidney disease, and uncontrolled diabetes who was transferred to this facility for placement of a chest tube  She presented to [facility] for complaints of chest pain and shortness of breath  At that time she was also apparently noted to be coughing  Due to initial suspicion of congestive heart failure she received IV diuretics  During the workup at [facility] she was noted on chest x-ray to have a pneumothorax and was recommended for placement of a chest tube at this facility  During the time of my evaluation patient reports feeling back to normal and denies any chest pain or shortness of breath    Of note patient has had 7 hospitalizations in the past 6 months and reportedly presents frequently for shortness of breath and also for issues with her tracheostomy requiring suction  Routine blood work done today did reveal rise in her creatinine but patient denies any difficulty passing urine, pain with urination or decreased urine output  No events vomiting or diarrhea reported  Patient currently asking for lunch        Review of Systems: limited due to patient communicating by mouthing words with trach  Review of Systems   Constitutional: Negative for fever and unexpected weight change  HENT: Positive for trouble swallowing (sense of food getting stuck)  Respiratory: Negative for cough, chest tightness, shortness of breath and wheezing  Cardiovascular: Negative for chest pain and palpitations  Gastrointestinal: Negative for abdominal pain, anal bleeding, blood in stool, diarrhea, nausea and vomiting  Genitourinary: Negative for decreased urine volume, difficulty urinating, dysuria and hematuria  Skin: Negative for color change, pallor, rash and wound  Psychiatric/Behavioral: Negative for confusion  Past Medical and Surgical History:   Past Medical History:   Diagnosis Date    Anxiety     Aortic aneurysm ([address] )     Arthritis     Depression     Diabetes mellitus (HCC)     Fibromyalgia     GERD (gastroesophageal reflux disease)     GERD (gastroesophageal reflux disease)     H/O cardiovascular stress test 09/2018    no ischemia  EF 70%   H/O echocardiogram 01/2019    EF 60%  Mild LVH  trivial effusion   Hyperlipidemia     Hypertension     Migraines     Psychiatric disorder     anxiety    Uncontrolled hypertension 2/25/2015    Last Assessment & Plan:  BP today above goal <140/90, apparently asymptomatic  Prior BP elevations were attributed to not taking medication  Consider increased medication if persistent on f/u  Past Surgical History:   Procedure Laterality Date    BACK SURGERY      Lumbar epidural steroid injection    CARDIAC CATHETERIZATION N/A 10/22/2021    Procedure: Cardiac pci;  Surgeon: Nataliya Montemayor MD;  Location[facility] CARD[facility]; Service: Cardiology    CARDIAC CATHETERIZATION  10/22/2021    Procedure: Cardiac catheterization;  Surgeon: Nataliya Montemayor MD;  [facility] C[facility]; Service: Cardiology    CARDIAC CATHETERIZATION Left 10/27/2021    Procedure: Cardiac Left Heart Cath;  Surgeon: Rebecca Muñoz MD;  Location: [facility] CARDI[facility];   Service: Cardiology    CARDIAC CATHETERIZATION N/A 10/27/2021 Procedure: Cardiac pci;  Surgeon: Zoey Thacker MD;  Location: [facility] CARDIAC [facility]; Service: Cardiology    CARDIAC CATHETERIZATION N/A 10/28/2021    Procedure: Cardiac pci;  Surgeon: Layla Mukherjee DO;  [facility]: [facility] CARD[facility]; Service: Cardiology    CARPAL TUNNEL RELEASE Left      SECTION      CHOLECYSTECTOMY      COLONOSCOPY      incomplete    COLONOSCOPY      EYE SURGERY      HYSTERECTOMY      Total    OVARIAN CYST REMOVAL      PEG W/TRACHEOSTOMY PLACEMENT N/A 2021    Procedure: TRACHEOSTOMY WITH INSERTION PEG TUBE;  Surgeon: Annmarie Fung DO;  Location: [facility] MAIN OR;  Service: General    TUBAL LIGATION      UPPER GASTROINTESTINAL ENDOSCOPY         Meds/Allergies:  Prior to Admission medications    Medication Sig Start Date End Date Taking?  Authorizing Provider   escitalopram (LEXAPRO) 10 mg tablet TAKE 1 TABLET BY MOUTH EVERY DAY 7/15/22   Altagracia Shipman MD   acetaminophen (TYLENOL) 160 mg/5 mL suspension 30 4 mL (975 mg total) by Per G Tube route every 6 (six) hours as needed for mild pain or fever 21   John Pedroza PA-C   albuterol (2 5 mg/3 mL) 0 083 % nebulizer solution Take 3 mL (2 5 mg total) by nebulization every 4 (four) hours as needed for wheezing or shortness of breath 22   Altagracia Shipman MD   amiodarone 100 mg tablet TAKE 1 TABLET BY MOUTH DAILY WITH BREAKFAST  22   Altagracia Shipman MD   apixaban (ELIQUIS) 5 mg Take 1 tablet (5 mg total) by mouth 2 (two) times a day 3/21/22   Altagracia Shipman MD   atorvastatin (LIPITOR) 40 mg tablet Take 1 tablet (40 mg total) by mouth daily 3/21/22   Altagracia Shipman MD   Benzocaine-Menthol 15-2 6 MG LOZG Apply 1 lozenge to the mouth or throat 4 (four) times a day as needed (sore throat)  Patient not taking: No sig reported 22   Lisette Avilez MD   Blood Pressure Monitoring (Sphygmomanometer) MISC Use 2 (two) times a day  Patient not taking: No sig reported 21   Anamaria Deleon MD   Brilinta 90 MG TAKE 1 TABLET BY MOUTH EVERY 12 HOURS  6/20/22   Petra Pitts MD   bumetanide [facility]) 2 mg tablet Take 1 tablet (2 mg total) by mouth 2 (two) times a day 7/5/22   NEELAM Delcid   chlorhexidine (PERIDEX) 0 12 % solution Apply 15 mL to the mouth or throat every 12 (twelve) hours 11/23/21   Philippe Aguirre PA-C   famotidine (PEPCID) 20 mg/2 5 mL oral suspension Take 2 5 mL (20 mg total) by mouth 2 (two) times a day 11/23/21   Bella Mercado PA-C   insulin aspart (NovoLOG FlexPen) 100 UNIT/ML injection pen Inject 4 Units under the skin 3 (three) times a day with meals 6/29/22   Olena Garcia MD   insulin glargine (Basaglar KwikPen) 100 units/mL injection pen Inject 16 Units under the skin daily 6/29/22   Olena Garcia MD   Insulin Pen Needle 31G X 6 MM MISC 1 Device by Does not apply route 4 (four) times a day 6/5/17   Historical Provider, MD   ipratropium (ATROVENT) 0 02 % nebulizer solution Take 2 5 mL (0 5 mg total) by nebulization 3 (three) times a day 11/23/21   Philippe Aguirre PA-C   Lancets MISC Use 1 Device 4 (four) times a day    Historical Provider, MD   levalbuterol (XOPENEX) 1 25 mg/0 5 mL nebulizer solution Take 0 5 mL (1 25 mg total) by nebulization 3 (three) times a day 11/23/21   Bella Mercado PA-C   LORazepam (Ativan) 1 mg tablet Take 0 5 tablets (0 5 mg total) by mouth every 8 (eight) hours as needed for anxiety or sleep 6/20/22   Petra Pitts MD   losartan (COZAAR) 50 mg tablet Take 1 tablet (50 mg total) by mouth every 12 (twelve) hours 6/6/22   NEELAM Delcid   magnesium Oxide (MAG-OX) 400 mg TABS TAKE 1 TABLET (400 MG TOTAL) BY MOUTH 2 TIMES A DAY 6/8/22   Historical Provider, MD   melatonin 3 mg Take 2 tablets (6 mg total) by mouth daily at bedtime  Patient not taking: No sig reported 3/15/22   Petra Pitts MD   metoprolol succinate (TOPROL-XL) 50 mg 24 hr tablet Take 1 tablet (50 mg total) by mouth every 12 (twelve) hours 6/6/22   NEELAM Delcid Pen Needle 32G X 6 MM MISC USE 3 (THREE) TIMES A DAY WITH MEALS 7/5/22   Antoni Barber MD   pantoprazole (PROTONIX) 40 mg tablet Take 1 tablet (40 mg total) by mouth daily 6/21/22   Amanuel Castellanos MD   polyethylene glycol (MIRALAX) 17 g packet Take 17 g by mouth daily  Patient not taking: No sig reported 11/23/21   Sarah Moraels PA-C   potassium chloride 40 MEQ/15ML (20%) oral solution Take 15 mL (40 mEq total) by mouth 2 (two) times a day 7/5/22   NEELAM Clark   pregabalin (LYRICA) 75 mg capsule TAKE ONE CAPSULE EVERY DAY 1/29/21   Radha Handley MD   senna-docusate sodium (SENOKOT S) 8 6-50 mg per tablet Take 1 tablet by mouth daily at bedtime  Patient not taking: No sig reported 11/23/21   Sarah Morales PA-C   spironolactone (ALDACTONE) 100 mg tablet TAKE 1 TABLET BY MOUTH EVERY DAY 6/20/22   Antoni Barber MD   traZODone (DESYREL) 50 mg tablet TAKE 0 5 TABLET (25MG) BY MOUTH NIGHTLY AS NEEDED FOR SLEEP  Patient not taking: No sig reported 2/10/22   Historical Provider, MD     Per transfer    Allergies: Allergies   Allergen Reactions    Metformin Diarrhea    Clonidine Rash     Patch        Social History:  Marital Status: Legally    Occupation:   Patient Pre-hospital Living Situation:home  Patient Pre-hospital Level of Mobility:   Patient Pre-hospital Diet Restrictions:  No restrictions  Substance Use History:   Social History     Substance and Sexual Activity   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  Social History     Tobacco Use   Smoking Status Former Smoker    Packs/day: 1 00    Years: 30 00    Pack years: 30 00    Types: Cigarettes   Smokeless Tobacco Never Used     Social History     Substance and Sexual Activity   Drug Use Yes    Types: Marijuana    Comment: medical; seldom use       Family History:  Noncontributory    Physical Exam:     Vitals:        Physical Exam  Vitals reviewed  Constitutional:       General: She is not in acute distress       Appearance: She is not ill-appearing, toxic-appearing or diaphoretic  Eyes:      General: No scleral icterus  Right eye: No discharge  Left eye: No discharge  Conjunctiva/sclera: Conjunctivae normal    Neck:      Comments: Tracheostomy in place with no bleeding or drainage  Cardiovascular:      Rate and Rhythm: Normal rate and regular rhythm  Heart sounds: No murmur heard  Pulmonary:      Effort: No respiratory distress  Breath sounds: No stridor  No wheezing, rhonchi or rales  Comments: Oxygen saturation 95%  No cough, wheezing, dyspnea, tachypnea  Patient breathing quite comfortably with no distress  Overall lung sounds clear  Abdominal:      General: Bowel sounds are normal  There is no distension  Palpations: Abdomen is soft  Tenderness: There is no abdominal tenderness  There is no guarding  Comments: Scar from prior PEG   Musculoskeletal:         General: No swelling, tenderness, deformity or signs of injury  Right lower leg: No edema  Left lower leg: No edema  Skin:     General: Skin is warm and dry  Coloration: Skin is not jaundiced or pale  Findings: No bruising, erythema, lesion or rash  Neurological:      Mental Status: She is alert        Comments: Awake alert interactive   Psychiatric:         Mood and Affect: Mood normal           Additional Data:     Lab Results:  Results from last 7 days   Lab Units 07/19/22  0510   WBC Thousand/uL 12 52*   HEMOGLOBIN g/dL 10 2*   HEMATOCRIT % 35 1   PLATELETS Thousands/uL 537*   NEUTROS PCT % 63   LYMPHS PCT % 16   MONOS PCT % 8   EOS PCT % 11*     Results from last 7 days   Lab Units 07/19/22  0510 07/18/22  0513 07/16/22  0444 07/15/22  0334   SODIUM mmol/L 131* 136   < > 134*   POTASSIUM mmol/L 5 5* 4 3   < > 3 5   CHLORIDE mmol/L 92* 93*   < > 94*   CO2 mmol/L 29 32   < > 30   BUN mg/dL 47* 32*   < > 12   CREATININE mg/dL 1 64* 1 30   < > 0 83   ANION GAP mmol/L 10 11   < > 10   CALCIUM mg/dL 9 3 9 6 < > 9 1   ALBUMIN g/dL  --  3 2*   < > 3 5   TOTAL BILIRUBIN mg/dL  --   --   --  0 51   ALK PHOS U/L  --   --   --  95   ALT U/L  --   --   --  3*   AST U/L  --   --   --  4*   GLUCOSE RANDOM mg/dL 262* 194*   < > 452*    < > = values in this interval not displayed  Results from last 7 days   Lab Units 07/19/22  0747 07/18/22  2141 07/18/22  1723 07/18/22  1148 07/18/22  0651 07/17/22  2051 07/17/22  1550 07/17/22  1134 07/17/22  0742 07/16/22 2057 07/16/22  1555 07/16/22  1119   POC GLUCOSE mg/dl 309* 261* 277* 197* 267* 209* 219* 295* 187* 261* 361* 366*         Results from last 7 days   Lab Units 07/19/22  0510 07/18/22  0843   PROCALCITONIN ng/ml 0 27* 0 24       Imaging:  Reviewed  XR chest portable   Final Result by Rere Hutchison MD (07/19 1315)      Small right pneumothorax not significantly changed  Patchy bibasilar atelectasis  Workstation performed: [id number]         FL barium swallow video w speech    (Results Pending)   XR chest portable    (Results Pending)       EKG and Other Studies Reviewed on Admission:   · EKG:     ** Please Note: This note has been constructed using a voice recognition system   **

## 2022-07-19 NOTE — ASSESSMENT & PLAN NOTE
· Pt presented with squeezing centralized chest pain at rest  Now resolved    · CXR on admission without any acute cardiopulmonary findings  · CXR 7/18 with new right-sided pneumothorax  · Had cardiac cath in 2019 with 90% occlusion of mid cx s/p PATRICA  · Recent echo from 2/2022 shows improved EF   · RAPLH 3, troponins x 3 negative  · EKG negative for ischemia  · Telemetry in place, pt with lifevest   · Follows with cardiology as an outpatient, recommend close follow up once medically stable for discharge

## 2022-07-19 NOTE — ASSESSMENT & PLAN NOTE
· Trach placed in the context of cardiac arrest and prolonged hospitalization November 2021  Apparently patient comes to the hospital frequently for suctioning  Had some bleeding from the trach at [facility], now resolved   Also had trach replaced there  · Per d/w speech therapy, will do video barium swallow as it was due to be done for sense of food getting stuck

## 2022-07-19 NOTE — ASSESSMENT & PLAN NOTE
Wt Readings from Last 3 Encounters:   07/19/22 78 2 kg (172 lb 6 4 oz)   06/29/22 81 7 kg (180 lb 3 2 oz)   06/21/22 84 kg (185 lb 3 oz)   · EF noted to be 50% on last echocardiogram earlier this year    · Received IV lasix upon arrival there for shortness of breath  · Appears euvolemic at this time; would hold diuretics now given MODESTO

## 2022-07-19 NOTE — ASSESSMENT & PLAN NOTE
· Likely in the setting of Acute renal dysfunction and spirolactone use in combination of daily potassium replenishment  · Will hold spirolactone   · Of note, patient did receive am dose of Potassium (40 mEq) with a Potassium of 5 5  · Will give Kayexalate now  · Repeat BMP at 1200  · Place on telemetry  · Low potassium diet

## 2022-07-19 NOTE — ASSESSMENT & PLAN NOTE
· NSR on admission   · Currently Home medication:  · Amiodarone 100 mg daily and Toprol XL 50 mg BID for rate control   · Continue Eliquis 5 mg BID for a/c  · Pt with mild blood from trach 7/17, has since resolved

## 2022-07-19 NOTE — PLAN OF CARE
Problem: MOBILITY - ADULT  Goal: Maintain or return to baseline ADL function  Description: INTERVENTIONS:  -  Assess patient's ability to carry out ADLs; assess patient's baseline for ADL function and identify physical deficits which impact ability to perform ADLs (bathing, care of mouth/teeth, toileting, grooming, dressing, etc )  - Assess/evaluate cause of self-care deficits   - Assess range of motion  - Assess patient's mobility; develop plan if impaired  - Assess patient's need for assistive devices and provide as appropriate  - Encourage maximum independence but intervene and supervise when necessary  - Involve family in performance of ADLs  - Assess for home care needs following discharge   - Consider OT consult to assist with ADL evaluation and planning for discharge  - Provide patient education as appropriate  Outcome: Progressing     Problem: Nutrition/Hydration-ADULT  Goal: Nutrient/Hydration intake appropriate for improving, restoring or maintaining nutritional needs  Description: Monitor and assess patient's nutrition/hydration status for malnutrition  Collaborate with interdisciplinary team and initiate plan and interventions as ordered  Monitor patient's weight and dietary intake as ordered or per policy  Utilize nutrition screening tool and intervene as necessary  Determine patient's food preferences and provide high-protein, high-caloric foods as appropriate       INTERVENTIONS:  - Monitor oral intake, urinary output, labs, and treatment plans  - Assess nutrition and hydration status and recommend course of action  - Evaluate amount of meals eaten  - Assist patient with eating if necessary   - Allow adequate time for meals  - Recommend/ encourage appropriate diets, oral nutritional supplements, and vitamin/mineral supplements  - Order, calculate, and assess calorie counts as needed  - Recommend, monitor, and adjust tube feedings and TPN/PPN based on assessed needs  - Assess need for intravenous fluids  - Provide specific nutrition/hydration education as appropriate  - Include patient/family/caregiver in decisions related to nutrition  Outcome: Progressing     Problem: CARDIOVASCULAR - ADULT  Goal: Absence of cardiac dysrhythmias or at baseline rhythm  Description: INTERVENTIONS:  - Continuous cardiac monitoring, vital signs, obtain 12 lead EKG if ordered  - Administer antiarrhythmic and heart rate control medications as ordered  - Monitor electrolytes and administer replacement therapy as ordered  Outcome: Progressing     Problem: RESPIRATORY - ADULT  Goal: Achieves optimal ventilation and oxygenation  Description: INTERVENTIONS:  - Assess for changes in respiratory status  - Assess for changes in mentation and behavior  - Position to facilitate oxygenation and minimize respiratory effort  - Oxygen administered by appropriate delivery if ordered  - Initiate smoking cessation education as indicated  - Encourage broncho-pulmonary hygiene including cough, deep breathe, Incentive Spirometry  - Assess the need for suctioning and aspirate as needed  - Assess and instruct to report SOB or any respiratory difficulty  - Respiratory Therapy support as indicated  Outcome: Progressing

## 2022-07-19 NOTE — ASSESSMENT & PLAN NOTE
· BP appears stable on review   · Home Medication:  · losartan 50 mg BID, Toprol XL 50 mg BID, Bumex 2 mg BID and Spironolactone 100 mg daily   · Home Losartan held in the setting of MODESTO  · Patient with worsening renal function-> Diuretics on hold  · Monitor BP closely

## 2022-07-19 NOTE — ASSESSMENT & PLAN NOTE
· Patient has a history of cardiac arrest, currently still has a LifeVest (Off, At bedside)  · Continue Home medication:  · BB, Brilinta, amiodarone and Eliquis  · Follows with cardiology as an outpatient, last outpatient appointment in the beginning of June of this year   · Pt advised to follow up with EP for possible ICD placement  · Patient will remain on telemetry monitoring

## 2022-07-19 NOTE — ASSESSMENT & PLAN NOTE
· Noted on lab work this morning  Likely due to receiving supplemental potassium, spironolactone, and ARB  Dose of Kayexalate was ordered before transfer   · Recheck BMP immediately    If needed will place patient on low potassium diet and hold ARBs/supplemental K/Aldactone

## 2022-07-19 NOTE — ASSESSMENT & PLAN NOTE
· Hgb 10 2, microcytic  · Baseline appears to be 9-10 on review   · Iron panel here with iron stores of 29, compatible with NERI  · C/w PO ferrous sulfate daily   · Trend hgb

## 2022-07-19 NOTE — ASSESSMENT & PLAN NOTE
· Patient with  MODESTO, (crt> 0 3 overnight) was 1 3, on multiple agents 7/18  · Patient with worsening MODESTO-> Crt now 1 64 (7/19)  · Discontinue Bumex 2 mg, patient euvolemic-> Re-evaluate diuretic need daily  · Continue to Hold losartan 50 mg  · Spironolactone decreased to 50 mg-> Consider holding tomorrow   · Monitor blood pressure  · Repeat BMP at 1200

## 2022-07-20 ENCOUNTER — APPOINTMENT (INPATIENT)
Dept: RADIOLOGY | Facility: HOSPITAL | Age: 57
DRG: 199 | End: 2022-07-20
Payer: COMMERCIAL

## 2022-07-20 ENCOUNTER — APPOINTMENT (INPATIENT)
Dept: CT IMAGING | Facility: HOSPITAL | Age: 57
DRG: 199 | End: 2022-07-20
Attending: INTERNAL MEDICINE
Payer: COMMERCIAL

## 2022-07-20 LAB
ANION GAP SERPL CALCULATED.3IONS-SCNC: 6 MMOL/L (ref 4–13)
ANION GAP SERPL CALCULATED.3IONS-SCNC: 8 MMOL/L (ref 4–13)
BASOPHILS # BLD AUTO: 0.09 THOUSANDS/ΜL (ref 0–0.1)
BASOPHILS NFR BLD AUTO: 1 % (ref 0–1)
BUN SERPL-MCNC: 43 MG/DL (ref 5–25)
BUN SERPL-MCNC: 44 MG/DL (ref 5–25)
CALCIUM SERPL-MCNC: 9.2 MG/DL (ref 8.4–10.2)
CALCIUM SERPL-MCNC: 9.2 MG/DL (ref 8.4–10.2)
CHLORIDE SERPL-SCNC: 92 MMOL/L (ref 96–108)
CHLORIDE SERPL-SCNC: 95 MMOL/L (ref 96–108)
CO2 SERPL-SCNC: 30 MMOL/L (ref 21–32)
CO2 SERPL-SCNC: 32 MMOL/L (ref 21–32)
CREAT SERPL-MCNC: 1.36 MG/DL (ref 0.6–1.3)
CREAT SERPL-MCNC: 1.37 MG/DL (ref 0.6–1.3)
EOSINOPHIL # BLD AUTO: 1.42 THOUSAND/ΜL (ref 0–0.61)
EOSINOPHIL NFR BLD AUTO: 12 % (ref 0–6)
ERYTHROCYTE [DISTWIDTH] IN BLOOD BY AUTOMATED COUNT: 16.6 % (ref 11.6–15.1)
GFR SERPL CREATININE-BSD FRML MDRD: 43 ML/MIN/1.73SQ M
GFR SERPL CREATININE-BSD FRML MDRD: 43 ML/MIN/1.73SQ M
GLUCOSE SERPL-MCNC: 167 MG/DL (ref 65–140)
GLUCOSE SERPL-MCNC: 186 MG/DL (ref 65–140)
GLUCOSE SERPL-MCNC: 213 MG/DL (ref 65–140)
GLUCOSE SERPL-MCNC: 260 MG/DL (ref 65–140)
GLUCOSE SERPL-MCNC: 345 MG/DL (ref 65–140)
GLUCOSE SERPL-MCNC: 383 MG/DL (ref 65–140)
HCT VFR BLD AUTO: 32.9 % (ref 34.8–46.1)
HGB BLD-MCNC: 9.9 G/DL (ref 11.5–15.4)
IMM GRANULOCYTES # BLD AUTO: 0.09 THOUSAND/UL (ref 0–0.2)
IMM GRANULOCYTES NFR BLD AUTO: 1 % (ref 0–2)
LYMPHOCYTES # BLD AUTO: 1.66 THOUSANDS/ΜL (ref 0.6–4.47)
LYMPHOCYTES NFR BLD AUTO: 14 % (ref 14–44)
MCH RBC QN AUTO: 23.4 PG (ref 26.8–34.3)
MCHC RBC AUTO-ENTMCNC: 30.1 G/DL (ref 31.4–37.4)
MCV RBC AUTO: 78 FL (ref 82–98)
MONOCYTES # BLD AUTO: 1.04 THOUSAND/ΜL (ref 0.17–1.22)
MONOCYTES NFR BLD AUTO: 9 % (ref 4–12)
NEUTROPHILS # BLD AUTO: 7.73 THOUSANDS/ΜL (ref 1.85–7.62)
NEUTS SEG NFR BLD AUTO: 63 % (ref 43–75)
NRBC BLD AUTO-RTO: 0 /100 WBCS
PLATELET # BLD AUTO: 542 THOUSANDS/UL (ref 149–390)
PMV BLD AUTO: 9.6 FL (ref 8.9–12.7)
POTASSIUM SERPL-SCNC: 4.8 MMOL/L (ref 3.5–5.3)
POTASSIUM SERPL-SCNC: 6.1 MMOL/L (ref 3.5–5.3)
RBC # BLD AUTO: 4.23 MILLION/UL (ref 3.81–5.12)
SODIUM SERPL-SCNC: 130 MMOL/L (ref 135–147)
SODIUM SERPL-SCNC: 133 MMOL/L (ref 135–147)
WBC # BLD AUTO: 12.03 THOUSAND/UL (ref 4.31–10.16)

## 2022-07-20 PROCEDURE — 32551 INSERTION OF CHEST TUBE: CPT

## 2022-07-20 PROCEDURE — 85025 COMPLETE CBC W/AUTO DIFF WBC: CPT | Performed by: PHYSICIAN ASSISTANT

## 2022-07-20 PROCEDURE — 71045 X-RAY EXAM CHEST 1 VIEW: CPT

## 2022-07-20 PROCEDURE — NC001 PR NO CHARGE: Performed by: INTERNAL MEDICINE

## 2022-07-20 PROCEDURE — 94640 AIRWAY INHALATION TREATMENT: CPT

## 2022-07-20 PROCEDURE — 80048 BASIC METABOLIC PNL TOTAL CA: CPT | Performed by: PHYSICIAN ASSISTANT

## 2022-07-20 PROCEDURE — 32555 ASPIRATE PLEURA W/ IMAGING: CPT | Performed by: INTERNAL MEDICINE

## 2022-07-20 PROCEDURE — 77011 CT SCAN FOR LOCALIZATION: CPT

## 2022-07-20 PROCEDURE — 99232 SBSQ HOSP IP/OBS MODERATE 35: CPT | Performed by: PHYSICIAN ASSISTANT

## 2022-07-20 PROCEDURE — C1769 GUIDE WIRE: HCPCS

## 2022-07-20 PROCEDURE — 99232 SBSQ HOSP IP/OBS MODERATE 35: CPT | Performed by: INTERNAL MEDICINE

## 2022-07-20 PROCEDURE — 82948 REAGENT STRIP/BLOOD GLUCOSE: CPT

## 2022-07-20 PROCEDURE — 0W9930Z DRAINAGE OF RIGHT PLEURAL CAVITY WITH DRAINAGE DEVICE, PERCUTANEOUS APPROACH: ICD-10-PCS | Performed by: INTERNAL MEDICINE

## 2022-07-20 PROCEDURE — 94760 N-INVAS EAR/PLS OXIMETRY 1: CPT

## 2022-07-20 RX ORDER — SODIUM POLYSTYRENE SULFONATE 4.1 MEQ/G
15 POWDER, FOR SUSPENSION ORAL; RECTAL ONCE
Status: COMPLETED | OUTPATIENT
Start: 2022-07-20 | End: 2022-07-20

## 2022-07-20 RX ORDER — INSULIN GLARGINE 100 [IU]/ML
28 INJECTION, SOLUTION SUBCUTANEOUS
Status: DISCONTINUED | OUTPATIENT
Start: 2022-07-20 | End: 2022-07-21

## 2022-07-20 RX ORDER — ACETAMINOPHEN 325 MG/1
975 TABLET ORAL EVERY 8 HOURS
Status: DISCONTINUED | OUTPATIENT
Start: 2022-07-20 | End: 2022-07-21

## 2022-07-20 RX ORDER — FENTANYL CITRATE 50 UG/ML
INJECTION, SOLUTION INTRAMUSCULAR; INTRAVENOUS CODE/TRAUMA/SEDATION MEDICATION
Status: COMPLETED | OUTPATIENT
Start: 2022-07-20 | End: 2022-07-20

## 2022-07-20 RX ORDER — INSULIN LISPRO 100 [IU]/ML
11 INJECTION, SOLUTION INTRAVENOUS; SUBCUTANEOUS
Status: DISCONTINUED | OUTPATIENT
Start: 2022-07-20 | End: 2022-07-21

## 2022-07-20 RX ADMIN — LORAZEPAM 0.5 MG: 0.5 TABLET ORAL at 05:08

## 2022-07-20 RX ADMIN — IPRATROPIUM BROMIDE 0.5 MG: 0.5 SOLUTION RESPIRATORY (INHALATION) at 14:47

## 2022-07-20 RX ADMIN — FENTANYL CITRATE 50 MCG: 50 INJECTION, SOLUTION INTRAMUSCULAR; INTRAVENOUS at 13:35

## 2022-07-20 RX ADMIN — PANTOPRAZOLE SODIUM 40 MG: 40 TABLET, DELAYED RELEASE ORAL at 05:06

## 2022-07-20 RX ADMIN — INSULIN LISPRO 11 UNITS: 100 INJECTION, SOLUTION INTRAVENOUS; SUBCUTANEOUS at 18:39

## 2022-07-20 RX ADMIN — SODIUM POLYSTYRENE SULFONATE 15 G: 1 POWDER ORAL; RECTAL at 08:28

## 2022-07-20 RX ADMIN — GUAIFENESIN 1200 MG: 600 TABLET ORAL at 21:42

## 2022-07-20 RX ADMIN — INSULIN GLARGINE 28 UNITS: 100 INJECTION, SOLUTION SUBCUTANEOUS at 21:43

## 2022-07-20 RX ADMIN — APIXABAN 5 MG: 5 TABLET, FILM COATED ORAL at 18:32

## 2022-07-20 RX ADMIN — APIXABAN 5 MG: 5 TABLET, FILM COATED ORAL at 08:10

## 2022-07-20 RX ADMIN — INSULIN LISPRO 6 UNITS: 100 INJECTION, SOLUTION INTRAVENOUS; SUBCUTANEOUS at 12:48

## 2022-07-20 RX ADMIN — LEVALBUTEROL HYDROCHLORIDE 1.25 MG: 1.25 SOLUTION, CONCENTRATE RESPIRATORY (INHALATION) at 20:11

## 2022-07-20 RX ADMIN — ACETAMINOPHEN 650 MG: 325 TABLET ORAL at 08:09

## 2022-07-20 RX ADMIN — AMIODARONE HYDROCHLORIDE 100 MG: 200 TABLET ORAL at 08:10

## 2022-07-20 RX ADMIN — PREGABALIN 75 MG: 75 CAPSULE ORAL at 08:10

## 2022-07-20 RX ADMIN — TICAGRELOR 90 MG: 90 TABLET ORAL at 05:06

## 2022-07-20 RX ADMIN — LEVALBUTEROL HYDROCHLORIDE 1.25 MG: 1.25 SOLUTION, CONCENTRATE RESPIRATORY (INHALATION) at 14:47

## 2022-07-20 RX ADMIN — FERROUS SULFATE TAB 325 MG (65 MG ELEMENTAL FE) 325 MG: 325 (65 FE) TAB at 08:10

## 2022-07-20 RX ADMIN — ACETAMINOPHEN 650 MG: 325 TABLET ORAL at 19:14

## 2022-07-20 RX ADMIN — METOPROLOL SUCCINATE 50 MG: 50 TABLET, EXTENDED RELEASE ORAL at 05:06

## 2022-07-20 RX ADMIN — INSULIN LISPRO 11 UNITS: 100 INJECTION, SOLUTION INTRAVENOUS; SUBCUTANEOUS at 14:20

## 2022-07-20 RX ADMIN — GUAIFENESIN 1200 MG: 600 TABLET ORAL at 08:10

## 2022-07-20 RX ADMIN — INSULIN LISPRO 4 UNITS: 100 INJECTION, SOLUTION INTRAVENOUS; SUBCUTANEOUS at 18:38

## 2022-07-20 RX ADMIN — ESCITALOPRAM OXALATE 10 MG: 10 TABLET ORAL at 08:10

## 2022-07-20 RX ADMIN — IPRATROPIUM BROMIDE 0.5 MG: 0.5 SOLUTION RESPIRATORY (INHALATION) at 20:11

## 2022-07-20 RX ADMIN — IPRATROPIUM BROMIDE 0.5 MG: 0.5 SOLUTION RESPIRATORY (INHALATION) at 07:34

## 2022-07-20 RX ADMIN — DESMOPRESSIN ACETATE 40 MG: 0.2 TABLET ORAL at 18:33

## 2022-07-20 RX ADMIN — LEVALBUTEROL HYDROCHLORIDE 1.25 MG: 1.25 SOLUTION, CONCENTRATE RESPIRATORY (INHALATION) at 07:34

## 2022-07-20 RX ADMIN — INSULIN LISPRO 2 UNITS: 100 INJECTION, SOLUTION INTRAVENOUS; SUBCUTANEOUS at 21:43

## 2022-07-20 RX ADMIN — INSULIN LISPRO 11 UNITS: 100 INJECTION, SOLUTION INTRAVENOUS; SUBCUTANEOUS at 08:13

## 2022-07-20 RX ADMIN — METOPROLOL SUCCINATE 50 MG: 50 TABLET, EXTENDED RELEASE ORAL at 18:45

## 2022-07-20 RX ADMIN — INSULIN LISPRO 8 UNITS: 100 INJECTION, SOLUTION INTRAVENOUS; SUBCUTANEOUS at 08:28

## 2022-07-20 NOTE — PROGRESS NOTES
[facility]  Progress Note - Sylvia Ugarte 1965, 65 y o  female MRN: [id number]  Unit/Bed#: S -01 Encounter: [id number]  Primary Care Provider: Marco العلي MD   Date and time admitted to hospital: 7/19/2022 11:33 AM    * Primary spontaneous pneumothorax  Assessment & Plan  · Patient noted on imaging to have small pneumothorax at [facility], subsequently transferred here to have placement of chest tube  · Repeat chest x-rays done here  This am had slight increase in PTX  · No reports of pain or SOB  · Consult from pulmonology appreciated  IR consulted to place chest tube today    Acute kidney injury superimposed on chronic kidney disease stage 3 [facility])  Assessment & Plan  Lab Results   Component Value Date    EGFR 43 07/20/2022    EGFR 38 07/19/2022    EGFR 38 07/19/2022    CREATININE 1 36 (H) 07/20/2022    CREATININE 1 49 (H) 07/19/2022    CREATININE 1 51 (H) 07/19/2022   · Creatinine elevated at 1 64 upon transfer on 7/19/22, with baseline being 0 9-1  Improved today to 1 36    Hyperkalemia  Assessment & Plan  · Noted on lab work this morning to be 6 1 despite already holding supplemental potassium, spironolactone, and ARB  Dose of Kayexalate was ordered  · Recheck BMP later today and again in am  · Already on low potassium diet     Shortness of breath  Assessment & Plan  · Patient reportedly presented to the hospital initially with shortness of breath and chest pain  She carries multiple significant underlying diagnoses including COPD, congestive heart failure, CHANTALE; She recently had COVID 2 months ago and has a tracheostomy   Now reports no chest pain/shortness of breath  · Avoid BiPAP for now given PTX  · Very minimal procal elevation and mild leukocytosis- will observe off antibiotics  · Pulmonology consult appreciated, case d/w them    Acute on chronic HFrEF (heart failure with reduced ejection fraction) ([facility])  Assessment & Plan  Wt Readings from Last 3 Encounters: 07/20/22 78 kg (172 lb)   07/19/22 78 2 kg (172 lb 6 4 oz)   06/29/22 81 7 kg (180 lb 3 2 oz)   · EF noted to be 50% on last echocardiogram earlier this year  · Received IV lasix upon arrival there for shortness of breath  · Appears euvolemic at this time; would hold diuretics now given MODESTO        Tracheostomy in place [facility])  Assessment & Plan  · Trach placed in the context of cardiac arrest and prolonged hospitalization November 2021  Apparently patient comes to the hospital frequently for suctioning  Had some bleeding from the trach at [facility], now resolved  Also had trach replaced there  · Per d/w speech therapy, will do video barium swallow as it was due to be done for sense of food getting stuck  · On trach collar O2 with humidification    Type 2 diabetes mellitus with hyperglycemia [facility])  Assessment & Plan  Lab Results   Component Value Date    HGBA1C 12 7 (H) 05/22/2022     Recent Labs     07/19/22  0747 07/19/22  1636 07/19/22  2104 07/20/22  0742   POCGLU 309* 297* 360* 345*     Blood Sugar Average: Last 72 hrs:  · (P) 352  5very poorly controlled based on most recent A1c and hyperglycemia noted here  · Monitor on Accu-Cheks with basal bolus regimen but titrate up  · ADA diet    A-fib (HCC)  Assessment & Plan  · On anticoagulation with Eliquis  · On amiodarone and Toprol    CAD (coronary artery disease)  Assessment & Plan  · History of MI oct 2021  · On beta-blocker, Brilinta  · Apparently complained of chest pain on admission to [place] but troponins were negative and EKG was nonischemic    History of Cardiac arrest [facility])  Assessment & Plan  · Patient apparently with history of VT cardiac arrest November 2021  Now with life vest--must be on telemetry if LifeVest off  Unclear why she has not yet followed up with EP to have ICD    Defer to outpatient cardiology      VTE Pharmacologic Prophylaxis: VTE Score: 3 eliquis    Patient Centered Rounds: I performed bedside rounds with nursing staff today   Discussions with Specialists or Other Care Team Provider: pulTONY brooks    Education and Discussions with Family / Patient: Updated  (son) via phone  Time Spent for Care: 30 minutes  More than 50% of total time spent on counseling and coordination of care as described above  Current Length of Stay: 1 day(s)  Current Patient Status: Inpatient   Certification Statement: The patient will continue to require additional inpatient hospital stay due to chest tube mgmt  Discharge Plan: home when cleared by pulm    Code Status: Level 1 - Full Code    Subjective:   Patient denies chest pain or shortness of breath  No new events or concerns from overnight  Objective:     Vitals:   Temp (24hrs), Av 2 °F (36 8 °C), Min:97 6 °F (36 4 °C), Max:98 8 °F (37 1 °C)    Temp:  [97 6 °F (36 4 °C)-98 8 °F (37 1 °C)] 98 8 °F (37 1 °C)  HR:  [59-66] 63  Resp:  [15-17] 17  BP: (113-145)/(71-84) 145/84  SpO2:  [98 %-100 %] 99 %  Body mass index is 25 4 kg/m²  Input and Output Summary (last 24 hours):   No intake or output data in the 24 hours ending 22 1017    Physical Exam:   Physical Exam  Vitals reviewed  Constitutional:       General: She is not in acute distress  Appearance: She is not ill-appearing, toxic-appearing or diaphoretic  Eyes:      General: No scleral icterus  Right eye: No discharge  Left eye: No discharge  Conjunctiva/sclera: Conjunctivae normal    Cardiovascular:      Rate and Rhythm: Normal rate and regular rhythm  Heart sounds: No murmur heard  Pulmonary:      Effort: No respiratory distress  Breath sounds: No stridor  No wheezing or rhonchi  Comments: No dyspnea, tachypnea  Lungs clear overall  Abdominal:      General: There is no distension  Tenderness: There is no guarding  Musculoskeletal:      Right lower leg: No edema  Left lower leg: No edema  Skin:     General: Skin is warm and dry        Coloration: Skin is not jaundiced or pale  Findings: No bruising, erythema, lesion or rash  Neurological:      Mental Status: She is alert  Comments: Awake alert interactive   Psychiatric:         Mood and Affect: Mood normal          Thought Content: Thought content normal           Additional Data:     Labs:  Results from last 7 days   Lab Units 07/20/22  0430   WBC Thousand/uL 12 03*   HEMOGLOBIN g/dL 9 9*   HEMATOCRIT % 32 9*   PLATELETS Thousands/uL 542*   NEUTROS PCT % 63   LYMPHS PCT % 14   MONOS PCT % 9   EOS PCT % 12*     Results from last 7 days   Lab Units 07/20/22  0430 07/19/22  0510 07/18/22  0513 07/16/22  0444 07/15/22  0334   SODIUM mmol/L 130*   < > 136   < > 134*   POTASSIUM mmol/L 6 1*   < > 4 3   < > 3 5   CHLORIDE mmol/L 92*   < > 93*   < > 94*   CO2 mmol/L 32   < > 32   < > 30   BUN mg/dL 43*   < > 32*   < > 12   CREATININE mg/dL 1 36*   < > 1 30   < > 0 83   ANION GAP mmol/L 6   < > 11   < > 10   CALCIUM mg/dL 9 2   < > 9 6   < > 9 1   ALBUMIN g/dL  --   --  3 2*   < > 3 5   TOTAL BILIRUBIN mg/dL  --   --   --   --  0 51   ALK PHOS U/L  --   --   --   --  95   ALT U/L  --   --   --   --  3*   AST U/L  --   --   --   --  4*   GLUCOSE RANDOM mg/dL 383*   < > 194*   < > 452*    < > = values in this interval not displayed           Results from last 7 days   Lab Units 07/20/22  0742 07/19/22  2104 07/19/22  1636 07/19/22  0747 07/18/22  2141 07/18/22  1723 07/18/22  1148 07/18/22  0651 07/17/22  2051 07/17/22  1550 07/17/22  1134 07/17/22  0742   POC GLUCOSE mg/dl 345* 360* 297* 309* 261* 277* 197* 267* 209* 219* 295* 187*         Results from last 7 days   Lab Units 07/19/22  1359 07/19/22  0510 07/18/22  0843   PROCALCITONIN ng/ml 0 22 0 27* 0 24       Lines/Drains:  Invasive Devices  Report    Peripheral Intravenous Line  Duration           Peripheral IV 07/20/22 Right Antecubital <1 day          Airway  Duration           Surgical Airway Shiley Fenestrated 140 days                  Telemetry:  Telemetry Orders (From admission, onward)             LifeVest Patient: Continuous Telemetry Monitoring during hospitalization (non-expiring)  Continuous LifeVest Telemetry Monitoring        References:    LifeVest Policy                 Telemetry Reviewed: Indication for Continued Telemetry Use: lifevest, hyperkalemia             Imaging:cxr    Recent Cultures (last 7 days):         Last 24 Hours Medication List:   Current Facility-Administered Medications   Medication Dose Route Frequency Provider Last Rate    acetaminophen  650 mg Oral Q6H PRN Leda Chand, PA-GAIL      albuterol  2 5 mg Nebulization Q4H PRN Leda Chand, PA-C      amiodarone  100 mg Oral Daily With Breakfast Leda Chand, PA-C      apixaban  5 mg Oral BID Leda Chand, PA-C      atorvastatin  40 mg Oral Daily With [employer], PA-C      benzonatate  100 mg Oral TID PRN Edel Dubose MD      escitalopram  10 mg Oral Daily Leda Chand, PA-GAIL      ferrous sulfate  325 mg Oral Daily With Breakfast Leda Chand, PA-GAIL      guaiFENesin  1,200 mg Oral Q12H [address] Leda Chand, PA-C      insulin glargine  28 Units Subcutaneous HS Leda Chand, PA-C      insulin lispro  11 Units Subcutaneous TID With Meals Leda Chand, PA-C      insulin lispro  2-12 Units Subcutaneous TID AC Leda Chand, PA-C      insulin lispro  2-12 Units Subcutaneous HS Leda Chand, PA-C      ipratropium  0 5 mg Nebulization TID Leda Chand, PA-C      levalbuterol  1 25 mg Nebulization TID Leda Chand, PA-GAIL      LORazepam  0 5 mg Oral Q8H PRN Leda Chand, PA-C      metoprolol succinate  50 mg Oral Q12H Leda Chand, PA-C      pantoprazole  40 mg Oral Early Morning Leda Chand, PA-GAIL      pregabalin  75 mg Oral Daily Leda Chand, PA-C      ticagrelor  90 mg Oral Q12H Leda Chand, PAGENA          Today, Patient Was Seen By: Jillian Bauer PA-C    **Please Note: This note may have been constructed using a voice recognition system  **

## 2022-07-20 NOTE — ASSESSMENT & PLAN NOTE
· Noted on lab work this morning to be 6 1 despite already holding supplemental potassium, spironolactone, and ARB    Dose of Kayexalate was ordered  · Recheck BMP later today and again in am  · Already on low potassium diet

## 2022-07-20 NOTE — TELEMEDICINE
e-Consult (IPC)  - Interventional Radiology  Damaris Vazquez 65 y o  female MRN: [id number]  Unit/Bed#: S -01 Encounter: [id number]          Interventional Radiology has been consulted to evaluate Kelli Red    We were consulted by pulmonary service concerning this patient with right pneumothorax  IP Consult to IR  Consult performed by: Geryl Gaucher, MD  Consult ordered by: Eve Shah PA-C        07/20/22    Assessment/Recommendation:   Patient is a 65year-old female with history of COPD, MI, s/p tracheostomy, now with spontaneous pneumothorax, with serial CXR showing similar to mild increase in size  We will plan for right chest tube placement today  Total time spent in review of data, discussion with requesting provider and rendering advice was 15 minutes  Thank you for allowing Interventional Radiology to participate in the care of Damaris Vazquez  Please don't hesitate to call or TigerText us with any questions       Geryl Gaucher, MD

## 2022-07-20 NOTE — PROGRESS NOTES
Progress Note - Pulmonary   Danyel Messenger 65 y o  female MRN: [id number]  Unit/Bed#: S -01 Encounter: [id number]    Assessment/Plan:    1  Acute pulmonary insufficiency on chronic hypoxic respiratory failure       -  currently on home O2 requirements 10 L trach collar-94%       -  continue saturations greater than 89%       -  pulmonary toileting:  Deep breathing cough, OOB as tolerated, IS Q 1 hr    2  Primary spontaneous pneumothorax       -  etiology unclear possibly secondary to coughing with pre-existing bleb       -  IR will place chest tube today         3  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        -  1/13/2022- tracheotomy placed        -  currently maintained 6 Shiley uncuffed        -  will continue trach care/suction as needed    4  Suspected COPD of unknown severity without acute exacerbation       -  Inpatient:  Albuterol as needed       -  home regimen:  Albuterol q 6h p r n        -  no indication for maintenance steroids at this time    5  Suspected dysphagia        -  VBS scheduled Thursday        -  aspiration precaution        -  0 27-- 0 22        -  no indication for antibiotics    6  Mild CHANTALE       -  follows with Dr Chucho Avilez       -  please hold BiPAP 15/11 cm of H2O until pneumothorax is resolved    Chief Complaint:    "I am a little bit nervous for the chest tube"    Subjective:    Kelli was comfortably sitting in her bed  She reports she is little bit nervous receiving the chest tube  No significant overnight events reported  Patient currently denying any fevers, chills, hemoptysis, headaches night sweats, pleuritic chest pain, or palpitations  Objective:    Vitals: Blood pressure 145/84, pulse 63, temperature 98 8 °F (37 1 °C), resp  rate 17, weight 78 kg (172 lb), SpO2 99 %  10L,Body mass index is 25 4 kg/m²      No intake or output data in the 24 hours ending 07/20/22 1225    Invasive Devices  Report    Peripheral Intravenous Line  Duration Peripheral IV 07/20/22 Right Antecubital <1 day          Airway  Duration           Surgical Airway Mariana Fenestrated 140 days                Physical Exam:   Physical Exam  Constitutional:       General: She is not in acute distress  Appearance: Normal appearance  She is normal weight  She is not ill-appearing  HENT:      Head: Normocephalic and atraumatic  Nose: Nose normal  No congestion or rhinorrhea  Mouth/Throat:      Mouth: Mucous membranes are dry  Pharynx: No oropharyngeal exudate or posterior oropharyngeal erythema  Cardiovascular:      Rate and Rhythm: Normal rate and regular rhythm  Pulses: Normal pulses  Heart sounds: Normal heart sounds  No murmur heard  No friction rub  No gallop  Pulmonary:      Effort: Pulmonary effort is normal  No tachypnea, bradypnea, accessory muscle usage or respiratory distress  Breath sounds: Decreased air movement present  No stridor  Decreased breath sounds and rhonchi present  No wheezing or rales  Comments: Some scattered rhonchi  Chest:      Chest wall: No tenderness  Abdominal:      General: Abdomen is flat  Bowel sounds are normal  There is no distension  Palpations: Abdomen is soft  There is no mass  Musculoskeletal:         General: No swelling or tenderness  Normal range of motion  Cervical back: Normal range of motion  No rigidity or tenderness  Skin:     General: Skin is warm and dry  Coloration: Skin is not jaundiced or pale  Neurological:      General: No focal deficit present  Mental Status: She is alert and oriented to person, place, and time  Mental status is at baseline  Psychiatric:         Mood and Affect: Mood normal          Behavior: Behavior normal          Labs:    I have personally reviewed pertinent lab results, CBC:   Lab Results   Component Value Date    WBC 12 03 (H) 07/20/2022    HGB 9 9 (L) 07/20/2022    HCT 32 9 (L) 07/20/2022    MCV 78 (L) 07/20/2022     (H) 07/20/2022    MCH 23 4 (L) 07/20/2022    MCHC 30 1 (L) 07/20/2022    RDW 16 6 (H) 07/20/2022    MPV 9 6 07/20/2022    NRBC 0 07/20/2022   , CMP:   Lab Results   Component Value Date    SODIUM 130 (L) 07/20/2022    K 6 1 (H) 07/20/2022    CL 92 (L) 07/20/2022    CO2 32 07/20/2022    BUN 43 (H) 07/20/2022    CREATININE 1 36 (H) 07/20/2022    CALCIUM 9 2 07/20/2022    EGFR 43 07/20/2022       Imaging and other studies: I have personally reviewed pertinent films in PACS     Chest x-ray-small right pneumothorax

## 2022-07-20 NOTE — ASSESSMENT & PLAN NOTE
· History of MI oct 2021  · On beta-blocker, Brilinta  · Apparently complained of chest pain on admission to [place] but troponins were negative and EKG was nonischemic

## 2022-07-20 NOTE — SPEECH THERAPY NOTE
Speech Language/Pathology    Speech/Language Pathology Progress Note    Patient Name: Jessica Chaudhry  [id number] Date: 7/20/2022     Pt was scheduled for VBS for 1345, rec'd TT from [employer] that pt is currently in IR getting a chest tube placed  Pt now re-scheduled for VBS for 7/21/22 @ 10:30         April Hi Jack CCC-SLP  Speech Pathologist  Available via  tiger text

## 2022-07-20 NOTE — ASSESSMENT & PLAN NOTE
· Patient noted on imaging to have small pneumothorax at [facility], subsequently transferred here to have placement of chest tube  · Repeat chest x-rays done here  This am had slight increase in PTX  · No reports of pain or SOB  · Consult from pulmonology appreciated   IR consulted to place chest tube today

## 2022-07-20 NOTE — INTERVAL H&P NOTE
Update: (This section must be completed if the H&P was completed greater than 24 hrs to procedure or admission)    H&P reviewed  After examining the patient, I find no changed to the H&P since it had been written  /84   Pulse 63   Temp 98 8 °F (37 1 °C)   Resp 17   Wt 78 kg (172 lb)   SpO2 99%   BMI 25 40 kg/m²     Patient re-evaluated  Accept as history and physical     We will plan for chest tube placement today      Artur Calero MD/July 20, 2022/1:14 PM

## 2022-07-20 NOTE — UTILIZATION REVIEW
Initial Clinical Review    Admission: Date/Time/Statement:   Admission Orders (From admission, onward)     Ordered        07/19/22 1458  Inpatient Admission  Once                      Orders Placed This Encounter   Procedures    Inpatient Admission     Standing Status:   Standing     Number of Occurrences:   1     Order Specific Question:   Level of Care     Answer:   Med Surg [16]     Order Specific Question:   Estimated length of stay     Answer:   More than 2 Midnights     Order Specific Question:   Certification     Answer:   I certify that inpatient services are medically necessary for this patient for a duration of greater than two midnights  See H&P and MD Progress Notes for additional information about the patient's course of treatment  Initial Presentation: 65 y o  female    Samara D 26  7/15 - 7/19  For acute CHF exacerbation (resolved)  w/continued coughing: complicated by  Hemoptysis  (bloody secretions from trach on 7/17)   Repeat CXR on 7/18 showed acute, right-sided pneumothorax  Pt is in need of chest tube decompression (not available at [facility])  So transferred to  [address] [place] [address]   Chronic hypoxemic resp failure w/tracheostomy  (at baseline requiring  5- 10L trach mask)         7/19      Admitted IP status,  MS Level of care,  Telemetry in place, pt with lifevest   Cont diuretic therapy (bumex/ spironolactone)  W/close monitoring of volume status,  Restriction of fluid and Na intake  Replete electrolytes prn   Provide routine trach care,  Scheduled and prn duonebs  Trend serial Hgb  Cont Eliquis,   Pt w/ developing MODESTO  (creatinine increased > 0 3 overnight)   on multiple agents: Will hold Bumex 2 mg for tonight, patient euvolemic  Will discontinue losartan 50 mg,  Will decrease spironolactone dose for tomorrow to 50 mg       Initial VS:  97 6   59    15   113/71    sat 98% on 5L Trach mask        wgt 78 kg   (172 lb)       7/20   Day 2 K+  this morning was 6 1 despite already holding supplemental potassium, spironolactone, ARB and on lo K+ diet  Dose of Kayexalate  ordered  W/repeat BMP today and again in AM     Repeat chest x-rays done here  This am had slight increase in PTX  No reports of pain or SOB    IR consulted to place chest tube today    7/20  PULMONOLOGY    Cont aggressive pulmonary toilet  Suspected dysphagia -  VBS scheduled Thursday 7/21   Cont aspiratio precautions  Pct 0 27 - 0 22  (no indication for abx)   Mild CHANTALE - HOLD Bipap 15/11 cm until pneumo resolved    7/20    IR PROCEDURE:   CT-guided placement of a right anterior apical 10 [place] chest tube with evacuation of the pneumothorax  Anesthesia: local and IV Fentanyl  Post CT CXR - Right apical pneumothorax seen, mildly increased from the previous study   Increased lung markings seen suggest mild congestion  Osseous structures appear within normal limits for patient age    ----------------------------------------------------------------------------------------------------------------------------------------------------------------    Additional Vital Signs:   07/20/22 07:35:39 98 8 °F (37 1 °C) 63 17 145/84 99 % -- Trach mask   07/20/22 05:05:46 -- 65 -- 138/79 99 % -- --   07/19/22 23:11:03 98 3 °F (36 8 °C) 63 17 132/74 98 % -- --   07/19/22 2025 -- 62 16 -- 98 % 8 L/min Trach mask       Pertinent Labs/Diagnostic Test Results:   XR chest portable   Final Result by Richmond Greenberg MD (07/20 0940)   Right apical pneumothorax, mildly increased from the previous study   Mild congestion, unchanged         XR chest portable   Final Result by Maria Antonia Shane MD (07/20 6192)   No change in small right pneumothorax  Persistent mild pulmonary venous congestion  XR chest portable   Final Result by Manju Box MD (07/19 1357)   Small right pneumothorax not significantly changed  Patchy bibasilar atelectasis        FL barium swallow video w speech (Results Pending)   IR chest tube placement    (Results Pending)         Results from last 7 days   Lab Units 07/20/22  0430 07/19/22  1359 07/19/22  0510 07/18/22  0513 07/17/22  0455   WBC Thousand/uL 12 03* 14 59* 12 52* 12 58* 11 23*   HEMOGLOBIN g/dL 9 9* 10 1* 10 2* 10 2* 9 7*   HEMATOCRIT % 32 9* 34 4* 35 1 34 0* 32 5*   PLATELETS Thousands/uL 542* 592* 537* 633* 533*   NEUTROS ABS Thousands/µL 7 73* 9 40* 8 01*  --   --          Results from last 7 days   Lab Units 07/20/22  0430 07/19/22  1359 07/19/22  0510 07/18/22  0513 07/17/22  0455 07/16/22  0444   SODIUM mmol/L 130* 134*  133* 131* 136 138 138   POTASSIUM mmol/L 6 1* 5 5*  5 3 5 5* 4 3 3 6 3 9   CHLORIDE mmol/L 92* 93*  92* 92* 93* 95* 96   CO2 mmol/L 32 34*  34* 29 32 34* 31   ANION GAP mmol/L 6 7  7 10 11 9 11   BUN mg/dL 43* 47*  47* 47* 32* 21 17   CREATININE mg/dL 1 36* 1 49*  1 51* 1 64* 1 30 1 01 0 94   EGFR ml/min/1 73sq m 43 38  38 34 45 62 68   CALCIUM mg/dL 9 2 9 4  9 4 9 3 9 6 9 0 8 5   MAGNESIUM mg/dL  --   --   --  2 1 1 6* 1 5*   PHOSPHORUS mg/dL  --   --   --  4 6* 3 9 4 2     Results from last 7 days   Lab Units 07/18/22  0513 07/17/22  0455 07/16/22  0444 07/15/22  0334   AST U/L  --   --   --  4*   ALT U/L  --   --   --  3*   ALK PHOS U/L  --   --   --  95   TOTAL PROTEIN g/dL  --   --   --  8 3   ALBUMIN g/dL 3 2* 3 1* 2 8* 3 5   TOTAL BILIRUBIN mg/dL  --   --   --  0 51     Results from last 7 days   Lab Units 07/20/22  1109 07/20/22  0742 07/19/22  2104 07/19/22  1636 07/19/22  0747 07/18/22  2141 07/18/22  1723 07/18/22  1148 07/18/22  0651 07/17/22  2051 07/17/22  1550 07/17/22  1134   POC GLUCOSE mg/dl 260* 345* 360* 297* 309* 261* 277* 197* 267* 209* 219* 295*     Results from last 7 days   Lab Units 07/20/22  0430 07/19/22  1359 07/19/22  0510 07/18/22  0513 07/17/22  0455 07/16/22  0444 07/15/22  0334   GLUCOSE RANDOM mg/dL 383* 293*  292* 262* 194* 217* 254* 452*     Results from last 7 days   Lab Units 07/15/22  0744 07/15/22  0533 07/15/22  0334   HS TNI 0HR ng/L  --   --  18   HS TNI 2HR ng/L  --  20  --    HSTNI D2 ng/L  --  2  --    HS TNI 4HR ng/L 24  --   --    HSTNI D4 ng/L 6  --   --      Results from last 7 days   Lab Units 07/19/22  1359 07/19/22  0510 07/18/22  0843   PROCALCITONIN ng/ml 0 22 0 27* 0 24     Results from last 7 days   Lab Units 07/15/22  0550   BNP pg/mL 472*     Results from last 7 days   Lab Units 07/15/22  0334   FERRITIN ng/mL 119     Past Medical History:   Diagnosis Date    Anxiety     Aortic aneurysm ([facility])     Arthritis     Depression     Diabetes mellitus ([facility])     Fibromyalgia     GERD (gastroesophageal reflux disease)     GERD (gastroesophageal reflux disease)     H/O cardiovascular stress test 09/2018    no ischemia  EF 70%   H/O echocardiogram 01/2019    EF 60%  Mild LVH  trivial effusion   Hyperlipidemia     Hypertension     Migraines     Psychiatric disorder     anxiety    Uncontrolled hypertension 2/25/2015    Last Assessment & Plan:  BP today above goal <140/90, apparently asymptomatic  Prior BP elevations were attributed to not taking medication  Consider increased medication if persistent on f/u  Present on Admission:   Primary spontaneous pneumothorax   Acute on chronic HFrEF (heart failure with reduced ejection fraction) ([facility])   Hyperkalemia   A-fib ([facility])   CAD (coronary artery disease)   History of Cardiac arrest ([facility])   Shortness of breath      Admitting Diagnosis: Chest pain [R07 9]  Age/Sex: 65 y o  female  Admission Orders:    Telemetry/ continuous life vest   Elevate HOB ,  Aspiration precautions  IR consult for CT placement  VBS w/speech therapy on 7/21; Trach collar oxygen - suction prn;  Hourly inc spirometry;  accucks qid w/SSI  Lo carb diet w/Ensure supplements  accucks qid w/SSI        Scheduled Medications:  amiodarone, 100 mg, Oral, Daily With Breakfast  apixaban, 5 mg, Oral, BID  atorvastatin, 40 mg, Oral, Daily With Dinner  escitalopram, 10 mg, Oral, Daily  ferrous sulfate, 325 mg, Oral, Daily With Breakfast  guaiFENesin, 1,200 mg, Oral, Q12H CEFERINO  insulin glargine, 28 Units, Subcutaneous, HS  insulin lispro, 11 Units, Subcutaneous, TID With Meals  insulin lispro, 2-12 Units, Subcutaneous, TID AC  insulin lispro, 2-12 Units, Subcutaneous, HS  ipratropium, 0 5 mg, Nebulization, TID  levalbuterol, 1 25 mg, Nebulization, TID  metoprolol succinate, 50 mg, Oral, Q12H  pantoprazole, 40 mg, Oral, Early Morning  pregabalin, 75 mg, Oral, Daily  ticagrelor, 90 mg, Oral, Q12H      Continuous IV Infusions:     PRN Meds:  acetaminophen, 650 mg, Oral, Q6H PRN  albuterol, 2 5 mg, Nebulization, Q4H PRN  benzonatate, 100 mg, Oral, TID PRN  LORazepam, 0 5 mg, Oral, Q8H PRN        IP CONSULT TO PULMONOLOGY  INPATIENT CONSULT TO IR    Network Utilization Review Department  ATTENTION: Please call with any questions or concerns to [phone] and carefully listen to the prompts so that you are directed to the right person  All voicemails are confidential   Farrel Bodily all requests for admission clinical reviews, approved or denied determinations and any other requests to dedicated fax number below belonging to the campus where the patient is receiving treatment   List of dedicated fax numbers for the Facilities:  [address] DENIALS (Administrative/Medical Necessity) [phone]   [address] (Maternity/NICU/Pediatrics) [phone]   [address]  [address] [address] [place] [address] [phone]     [address]  [place] [address] [address] [place] [address] [phone]

## 2022-07-20 NOTE — BRIEF OP NOTE (RAD/CATH)
INTERVENTIONAL RADIOLOGY PROCEDURE NOTE    Date: 7/20/2022    Procedure: Chest tube placement    Preoperative diagnosis:   1  Primary spontaneous pneumothorax    2  Pneumothorax         Postoperative diagnosis: Same  Surgeon: Nik Anderson MD     Assistant: None  No qualified resident was available  Blood loss: None    Specimens: None     Findings: CT-guided placement of a right anterior apical 10 [place] chest tube with evacuation of the pneumothorax  Please see the radiology report for details  Complications: None immediate      Anesthesia: local and IV Fentanyl

## 2022-07-20 NOTE — ASSESSMENT & PLAN NOTE
· Trach placed in the context of cardiac arrest and prolonged hospitalization November 2021  Apparently patient comes to the hospital frequently for suctioning  Had some bleeding from the trach at [facility], now resolved   Also had trach replaced there  · Per d/w speech therapy, will do video barium swallow as it was due to be done for sense of food getting stuck  · On trach collar O2 with humidification

## 2022-07-20 NOTE — UTILIZATION REVIEW
Notification of Discharge   This is a Notification of Discharge from our facility [address]  Please be advised that this patient has been discharge from our facility  Below you will find the admission and discharge date and time including the patients disposition  UTILIZATION REVIEW CONTACT:  Armando Noel  Utilization   Network Utilization Review Department  Phone: [phone] x carefully listen to the prompts  All voicemails are confidential   Email: [email or web address]     PHYSICIAN ADVISORY SERVICES:  FOR [id number] REVIEW - MEDICAL NECESSITY DENIAL  Phone: [phone]  Fax: [phone]  Email: [email or web address]     PRESENTATION DATE: 7/15/2022  2:23 AM  OBERVATION ADMISSION DATE:   INPATIENT ADMISSION DATE: 7/15/22  4:36 PM   DISCHARGE DATE: 7/19/2022 11:21 AM  DISPOSITION: [address] [facility]      IMPORTANT INFORMATION:  Send all requests for admission clinical reviews, approved or denied determinations and any other requests to dedicated fax number below belonging to the campus where the patient is receiving treatment   List of dedicated fax numbers:  [address] DENIALS (Administrative/Medical Necessity) [phone]   [address] [place] [address] (Maternity/NICU/Pediatrics) [phone]   Forsan Boots [phone]   [address] [facility] [address] [phone]   [address] [place] [address] [phone]   [address] [place] [address] [address] [place] [address],[address] [phone]   [facility]  [phone]   [address] [place] [address], [facility]  [address] [facility] [address] [facility] [phone]

## 2022-07-20 NOTE — ASSESSMENT & PLAN NOTE
Lab Results   Component Value Date    HGBA1C 12 7 (H) 05/22/2022     Recent Labs     07/19/22  0747 07/19/22  1636 07/19/22  2104 07/20/22  0742   POCGLU 309* 297* 360* 345*     Blood Sugar Average: Last 72 hrs:  · (P) 352  5very poorly controlled based on most recent A1c and hyperglycemia noted here  · Monitor on Accu-Cheks with basal bolus regimen but titrate up  · ADA diet

## 2022-07-20 NOTE — ASSESSMENT & PLAN NOTE
Lab Results   Component Value Date    EGFR 43 07/20/2022    EGFR 38 07/19/2022    EGFR 38 07/19/2022    CREATININE 1 36 (H) 07/20/2022    CREATININE 1 49 (H) 07/19/2022    CREATININE 1 51 (H) 07/19/2022   · Creatinine elevated at 1 64 upon transfer on 7/19/22, with baseline being 0 9-1   Improved today to 1 36

## 2022-07-20 NOTE — APP STUDENT NOTE
CHELSEA STUDENT  Inpatient Progress Note for TRAINING ONLY  Not Part of Legal Medical Record       Progress Note - Benjamín Mejias 65 y o  female MRN: [id number]    Unit/Bed#: S -01 Encounter: [id number]      Assessment:  1  *Primary spontaneous pneumothorax    2  Acute kidney injury w/ stage 3 chronic kidney disease    3  Hyperkalemia    4  Chronic respiratory failure w/ hypoxia and tracheostomy    5  Type 2 diabetes mellitus    6  Heart failure w/ reduced ejection fraction    7  Atrial Fibrillation    8  History of cardiac arrest    Plan:  1  *Primary spontaneous pneumothorax  · Patient transferred from [address] due to a right apical spontaneous pneumothorax and in need for proper placement of a chest tube with IR    · On arrival, patient was not in any acute respiratory distress  SpO2 >94% on 10 L of O2 with trach collar over tracheostomy placement  · Chest xray on arrival noted "Small right pneumothorax not significantly changed  Patchy bibasilar atelectasis "  · Follow-up chest xray showed no significant change in size of pneumothorax, however today a repeat xray noted a pneumothorax mildly increased since last study  · Pulmonology consulted yesterday, awaiting their input today for chest tube placement with IR  · Chest tube placement most likely to take place tomorrow afternoon  · No acute respiratory distress, no shortness of breath, moderate wheezes and crackles throughout lungs  · Monitor SpO2, incentive spirometry, oxygen supplimentation    2  Acute kidney injury on stage 3 chronic kidney disease  · Creatinine 1 36 this morning, improved from 1 49 yesterday  · Avoid nephrotoxic drugs  · ARB and spironolactone put on hold for now  · Monitor BMP every few hours    3  Hyperkalemia  · Potassium 6 1 today  · ARB, spironolactone, and potassium supplementation put on hold  · Low-potassium diet initiated  · Kayexalate 15 mg PO initiated to lower potassium levels    4   Chronic respiratory failure w/ hypoxia and tracheostomy  · Patient visits ED regularly for suction of tracheostomy  Last visit at [facility], patient had shortness of breath and chest pain on presentation  · Work-up ruled out a cardiac pathology  · Noncompliant on medication, multiple chronic conditions, severe anxiety  · First received tracheostomy after a cardiac arrest in November 2021  · Continue suction of trach, maintain SpO2 above 88%, trach care of trach collar, supplemental O2  · Continue atrovent, xopenex, and prn albuterol    5  Type 2 diabetes mellitus  · Patient hyperglycemic 383 today  · Received 28 units of Lantus today, 11 units of Humalog for hyperglycemic episodes today  · POC Glucose every 4 hours  · Monitor BMP closely    6  Heart failure w/ reduced ejection fraction  · Most recent ECHO on 02/24, estimated ejection fraction is 50%, moderate hypokinesis of the inferior and the basal anterolateral walls, atrium mildly dilated  · Can resume spironolactone once potassium normalizes  · Daily weights, I&Os    7  Atrial Fibrillation  · Continue on Eliquis, if hemoptysis continues then hold  · Continue metoprolol treatment    8  History of cardiac arrest  · Patient has a history of cardiac arrest from November 2021  · Remained in the hospital for over a month  · Currently wearing Life Vest for ventricular tachycardia    Subjective:   Patient's history limited due to effort of speaking through her tracheostomy  However, patient denies symptoms of fever, chills, shortness of breath, chest pain, nausea, lightheadedness, headaches, abdominal pain, or urinary symptoms  Patient having regular bowel movements she states  Patient really wanting to have tracheostomy removed, does not like having it  Objective:     Vitals: Blood pressure 145/84, pulse 63, temperature 98 8 °F (37 1 °C), resp  rate 17, weight 78 kg (172 lb), SpO2 99 %  ,Body mass index is 25 4 kg/m²      No intake or output data in the 24 hours ending 07/20/22 8388    Physical Exam  Vitals and nursing note reviewed  Constitutional:       General: She is not in acute distress  Appearance: Normal appearance  She is not toxic-appearing  HENT:      Head: Normocephalic  Mouth/Throat:      Mouth: Mucous membranes are moist       Pharynx: Oropharynx is clear  Eyes:      Extraocular Movements: Extraocular movements intact  Pupils: Pupils are equal, round, and reactive to light  Cardiovascular:      Rate and Rhythm: Normal rate and regular rhythm  Pulses: Normal pulses  Heart sounds: Normal heart sounds  Pulmonary:      Effort: Pulmonary effort is normal  No respiratory distress  Breath sounds: Wheezing and rales present  Comments: -Slightly decreased breath sounds over right apical lung field  -Rales and wheezing bilateral throughout  Abdominal:      General: Bowel sounds are normal       Palpations: Abdomen is soft  Skin:     General: Skin is dry  Capillary Refill: Capillary refill takes 2 to 3 seconds  Comments: -Dry flaking skin   Neurological:      General: No focal deficit present  Mental Status: She is alert and oriented to person, place, and time  Psychiatric:         Mood and Affect: Mood normal          Invasive Devices  Report    Peripheral Intravenous Line  Duration           Peripheral IV 07/20/22 Right Antecubital <1 day          Airway  Duration           Surgical Airway Shiley Fenestrated 140 days                Lab, Imaging and other studies: I have personally reviewed pertinent reports      VTE Pharmacologic Prophylaxis: VTE score 3: LINDSEY Sotelo

## 2022-07-20 NOTE — ASSESSMENT & PLAN NOTE
Wt Readings from Last 3 Encounters:   07/20/22 78 kg (172 lb)   07/19/22 78 2 kg (172 lb 6 4 oz)   06/29/22 81 7 kg (180 lb 3 2 oz)   · EF noted to be 50% on last echocardiogram earlier this year    · Received IV lasix upon arrival there for shortness of breath  · Appears euvolemic at this time; would hold diuretics now given MODESTO

## 2022-07-21 ENCOUNTER — APPOINTMENT (INPATIENT)
Dept: RADIOLOGY | Facility: HOSPITAL | Age: 57
DRG: 199 | End: 2022-07-21
Payer: COMMERCIAL

## 2022-07-21 LAB
ANION GAP SERPL CALCULATED.3IONS-SCNC: 7 MMOL/L (ref 4–13)
BUN SERPL-MCNC: 42 MG/DL (ref 5–25)
CALCIUM SERPL-MCNC: 9.2 MG/DL (ref 8.4–10.2)
CHLORIDE SERPL-SCNC: 96 MMOL/L (ref 96–108)
CO2 SERPL-SCNC: 31 MMOL/L (ref 21–32)
CREAT SERPL-MCNC: 1.26 MG/DL (ref 0.6–1.3)
GFR SERPL CREATININE-BSD FRML MDRD: 47 ML/MIN/1.73SQ M
GLUCOSE SERPL-MCNC: 178 MG/DL (ref 65–140)
GLUCOSE SERPL-MCNC: 217 MG/DL (ref 65–140)
GLUCOSE SERPL-MCNC: 225 MG/DL (ref 65–140)
GLUCOSE SERPL-MCNC: 232 MG/DL (ref 65–140)
GLUCOSE SERPL-MCNC: 278 MG/DL (ref 65–140)
POTASSIUM SERPL-SCNC: 4.5 MMOL/L (ref 3.5–5.3)
SODIUM SERPL-SCNC: 134 MMOL/L (ref 135–147)

## 2022-07-21 PROCEDURE — 94640 AIRWAY INHALATION TREATMENT: CPT

## 2022-07-21 PROCEDURE — 99232 SBSQ HOSP IP/OBS MODERATE 35: CPT | Performed by: INTERNAL MEDICINE

## 2022-07-21 PROCEDURE — 99232 SBSQ HOSP IP/OBS MODERATE 35: CPT | Performed by: PHYSICIAN ASSISTANT

## 2022-07-21 PROCEDURE — BD11YZZ FLUOROSCOPY OF ESOPHAGUS USING OTHER CONTRAST: ICD-10-PCS | Performed by: PHYSICIAN ASSISTANT

## 2022-07-21 PROCEDURE — 92611 MOTION FLUOROSCOPY/SWALLOW: CPT

## 2022-07-21 PROCEDURE — 94760 N-INVAS EAR/PLS OXIMETRY 1: CPT

## 2022-07-21 PROCEDURE — 80048 BASIC METABOLIC PNL TOTAL CA: CPT | Performed by: PHYSICIAN ASSISTANT

## 2022-07-21 PROCEDURE — 71045 X-RAY EXAM CHEST 1 VIEW: CPT

## 2022-07-21 PROCEDURE — 82948 REAGENT STRIP/BLOOD GLUCOSE: CPT

## 2022-07-21 PROCEDURE — 74230 X-RAY XM SWLNG FUNCJ C+: CPT

## 2022-07-21 RX ORDER — INSULIN GLARGINE 100 [IU]/ML
30 INJECTION, SOLUTION SUBCUTANEOUS
Status: DISCONTINUED | OUTPATIENT
Start: 2022-07-21 | End: 2022-07-22

## 2022-07-21 RX ORDER — INSULIN LISPRO 100 [IU]/ML
12 INJECTION, SOLUTION INTRAVENOUS; SUBCUTANEOUS
Status: DISCONTINUED | OUTPATIENT
Start: 2022-07-21 | End: 2022-07-22

## 2022-07-21 RX ORDER — OXYCODONE HYDROCHLORIDE 5 MG/1
2.5 TABLET ORAL EVERY 4 HOURS PRN
Status: DISCONTINUED | OUTPATIENT
Start: 2022-07-21 | End: 2022-07-22

## 2022-07-21 RX ORDER — ACETAMINOPHEN 325 MG/1
975 TABLET ORAL EVERY 6 HOURS SCHEDULED
Status: DISCONTINUED | OUTPATIENT
Start: 2022-07-21 | End: 2022-07-28

## 2022-07-21 RX ORDER — BUMETANIDE 1 MG/1
1 TABLET ORAL 2 TIMES DAILY
Status: DISCONTINUED | OUTPATIENT
Start: 2022-07-21 | End: 2022-07-23

## 2022-07-21 RX ADMIN — DESMOPRESSIN ACETATE 40 MG: 0.2 TABLET ORAL at 16:49

## 2022-07-21 RX ADMIN — INSULIN LISPRO 11 UNITS: 100 INJECTION, SOLUTION INTRAVENOUS; SUBCUTANEOUS at 09:44

## 2022-07-21 RX ADMIN — ESCITALOPRAM OXALATE 10 MG: 10 TABLET ORAL at 09:39

## 2022-07-21 RX ADMIN — INSULIN LISPRO 4 UNITS: 100 INJECTION, SOLUTION INTRAVENOUS; SUBCUTANEOUS at 22:00

## 2022-07-21 RX ADMIN — TICAGRELOR 90 MG: 90 TABLET ORAL at 05:41

## 2022-07-21 RX ADMIN — INSULIN LISPRO 4 UNITS: 100 INJECTION, SOLUTION INTRAVENOUS; SUBCUTANEOUS at 13:08

## 2022-07-21 RX ADMIN — IPRATROPIUM BROMIDE 0.5 MG: 0.5 SOLUTION RESPIRATORY (INHALATION) at 20:40

## 2022-07-21 RX ADMIN — PREGABALIN 75 MG: 75 CAPSULE ORAL at 09:39

## 2022-07-21 RX ADMIN — LEVALBUTEROL HYDROCHLORIDE 1.25 MG: 1.25 SOLUTION, CONCENTRATE RESPIRATORY (INHALATION) at 14:26

## 2022-07-21 RX ADMIN — INSULIN LISPRO 12 UNITS: 100 INJECTION, SOLUTION INTRAVENOUS; SUBCUTANEOUS at 13:08

## 2022-07-21 RX ADMIN — OXYCODONE HYDROCHLORIDE 2.5 MG: 5 TABLET ORAL at 09:39

## 2022-07-21 RX ADMIN — METOPROLOL SUCCINATE 50 MG: 50 TABLET, EXTENDED RELEASE ORAL at 05:40

## 2022-07-21 RX ADMIN — GUAIFENESIN 1200 MG: 600 TABLET ORAL at 09:39

## 2022-07-21 RX ADMIN — LEVALBUTEROL HYDROCHLORIDE 1.25 MG: 1.25 SOLUTION, CONCENTRATE RESPIRATORY (INHALATION) at 08:27

## 2022-07-21 RX ADMIN — FERROUS SULFATE TAB 325 MG (65 MG ELEMENTAL FE) 325 MG: 325 (65 FE) TAB at 09:39

## 2022-07-21 RX ADMIN — AMIODARONE HYDROCHLORIDE 100 MG: 200 TABLET ORAL at 09:40

## 2022-07-21 RX ADMIN — ACETAMINOPHEN 975 MG: 325 TABLET ORAL at 22:00

## 2022-07-21 RX ADMIN — BUMETANIDE 1 MG: 1 TABLET ORAL at 09:39

## 2022-07-21 RX ADMIN — INSULIN LISPRO 2 UNITS: 100 INJECTION, SOLUTION INTRAVENOUS; SUBCUTANEOUS at 18:37

## 2022-07-21 RX ADMIN — ACETAMINOPHEN 975 MG: 325 TABLET ORAL at 03:44

## 2022-07-21 RX ADMIN — INSULIN LISPRO 12 UNITS: 100 INJECTION, SOLUTION INTRAVENOUS; SUBCUTANEOUS at 18:37

## 2022-07-21 RX ADMIN — IPRATROPIUM BROMIDE 0.5 MG: 0.5 SOLUTION RESPIRATORY (INHALATION) at 14:26

## 2022-07-21 RX ADMIN — TICAGRELOR 90 MG: 90 TABLET ORAL at 16:48

## 2022-07-21 RX ADMIN — INSULIN GLARGINE 30 UNITS: 100 INJECTION, SOLUTION SUBCUTANEOUS at 21:59

## 2022-07-21 RX ADMIN — ACETAMINOPHEN 975 MG: 325 TABLET ORAL at 13:12

## 2022-07-21 RX ADMIN — IPRATROPIUM BROMIDE 0.5 MG: 0.5 SOLUTION RESPIRATORY (INHALATION) at 08:27

## 2022-07-21 RX ADMIN — GUAIFENESIN 1200 MG: 600 TABLET ORAL at 21:59

## 2022-07-21 RX ADMIN — APIXABAN 5 MG: 5 TABLET, FILM COATED ORAL at 16:48

## 2022-07-21 RX ADMIN — LEVALBUTEROL HYDROCHLORIDE 1.25 MG: 1.25 SOLUTION, CONCENTRATE RESPIRATORY (INHALATION) at 20:40

## 2022-07-21 RX ADMIN — APIXABAN 5 MG: 5 TABLET, FILM COATED ORAL at 09:39

## 2022-07-21 RX ADMIN — PANTOPRAZOLE SODIUM 40 MG: 40 TABLET, DELAYED RELEASE ORAL at 05:41

## 2022-07-21 RX ADMIN — INSULIN LISPRO 6 UNITS: 100 INJECTION, SOLUTION INTRAVENOUS; SUBCUTANEOUS at 09:45

## 2022-07-21 RX ADMIN — METOPROLOL SUCCINATE 50 MG: 50 TABLET, EXTENDED RELEASE ORAL at 16:49

## 2022-07-21 NOTE — PROCEDURES
Speech Pathology Videofluoroscopic Swallow Study (VFSS/VBSS/MBSS)      Patient Name: Jane Hicks    [id number]'[id number] Date: 7/21/2022     Problem List  Principal Problem:    Primary spontaneous pneumothorax  Active Problems:    Type 2 diabetes mellitus with hyperglycemia ([place] [address] )    A-fib ([place] [address] )    History of Cardiac arrest ([place] [address] )    CAD (coronary artery disease)    Tracheostomy in place [facility])    Acute on chronic HFrEF (heart failure with reduced ejection fraction) ([facility])    Shortness of breath    Hyperkalemia    Acute kidney injury superimposed on chronic kidney disease stage 3 ([place] [address] )      Past Medical History  Past Medical History:   Diagnosis Date    Anxiety     Aortic aneurysm ([place] [address] )     Arthritis     Depression     Diabetes mellitus ([facility])     Fibromyalgia     GERD (gastroesophageal reflux disease)     GERD (gastroesophageal reflux disease)     H/O cardiovascular stress test 09/2018    no ischemia  EF 70%   H/O echocardiogram 01/2019    EF 60%  Mild LVH  trivial effusion   Hyperlipidemia     Hypertension     Migraines     Psychiatric disorder     anxiety    Uncontrolled hypertension 2/25/2015    Last Assessment & Plan:  BP today above goal <140/90, apparently asymptomatic  Prior BP elevations were attributed to not taking medication  Consider increased medication if persistent on f/u  Past Surgical History  Tracheostomy with insertion of PEG 11/12/21      General Information;  Pt is a 65 y o  female well known to our department with a history tracheostomy (placed 11/12/21) with inability to tolerate speaking valve over the past few months  Recent ENT assessment revealed diffuse edema affecting the upper airway (epiglottis and vocal cords)  She recently reported the sensation of solid food (meat) sticking in her throat with oral intake  She denied symptoms of dysphagia with breads, pills, salad/raw vegetables)  A VFSS was ordered and completed as an inpatient    [facility] Score was viewed in lateral position and assessed with nectar thick liquid, thin liquid, pureed food (applesauce), solid food (bite of peanut butter sandwich and Eloisa Doone cookie) and a13mm pill with thin liquid  Oral stage:  No significant s/s oral stage dysphagia  Mastication was timely and grossly effective with materials administered today  Bolus formation and transfer were functional   Oral control appeared adequate (good lateralization and recollection of food, no labial leakage or premature spillage     Pharyngeal stage:  Minimal/mild pharyngeal dysphagia  Velar elevation noted  Swallowing initiation was prompt  Anterior hyoid excursion appeared adequate but laryngeal rise was at least moderately reduced  AIrway entrance closure appeared adequate  Tongue base retraction appeared to be of adequate strength  Pharyngeal constriction suspected or appeared adequate  A small osteophyte was present at C5-6  Limited visualization of air noted in the hypopharynx and laryngeal inlet (?edema in hypopharynx as well)  Management of food/liquid/barium tablet follows: All food, liquid and the barium tablet passed through the pharynx safely with no persistent  laryngeal penetration and no aspiration  Trace pharyngeal residue was noted today along the aryepiglottic folds and in area of pyriforms         Strategies and Efficacy: Thorough chewing may have minimized (risk for) stasis in area of C5-6 and hypopharynx today    Esophageal stage:  Brief view of esophagus was completed after administration of the barium tablet and no gross pill stasis was identified  Assessment Summary:    Jorge Luis Corral presented with s/s suggestive of minimal pharyngeal dysphagia in the presence of altered anatomy  (presence #6 cuffless trach tube, significant supraglottic edema, small osteophyte at C5-6) and physiology (reduced laryngeal rise)    Minimal food stasis identified today (not: pt disliked food with barium and took small bites and chewed thoroughly)  No pill stasis noted in pharynx or esophagus  Note: Images are available for review in PACS as desired  NOMS (National Outcome Measurement System): Swallowing "Score"    LEVEL 6: Swallowing is safe, and the individual eats and drinks independently and/or may rarely require minimal cueing  The individual usually self-cues when difficulty occurs  May need to avoid specific food items (e g , popcorn, nuts, dense meat) or require additional time (due to dysphagia)  Recommendations:   Recommended Diet:  regular diet and thin liquids (please slice/cut meat well)  Recommended Form of Medications: as desired and best tolerated  Compensatory swallowing strategies: small bites and chew well  SLP Dysphagia therapy recommended:  I reviewed recommendations in the fluoroscopy suite as well as at bedside  She was able to recall the benefit of small bites and thorough chewing  No additional treatment appears indicated at this time  However, I suggest that if complaints of dysphagia increase that repeat ENT evaluation be completed to rule out increasing pharyngeal edema with risk for increasing symptoms of solid food dysphagia  Thank you for this referral   Please do not hesitate to contact me with any questions or concerns      Wojciech Myers [facility]   Speech Pathologist  [place] License [id number]  Available via [facility] Text

## 2022-07-21 NOTE — ASSESSMENT & PLAN NOTE
Lab Results   Component Value Date    HGBA1C 12 7 (H) 05/22/2022     Recent Labs     07/20/22  1109 07/20/22  1614 07/20/22  2100 07/21/22  0741   POCGLU 260* 213* 167* 278*     Blood Sugar Average: Last 72 hrs:  · (P) 269very poorly controlled based on most recent A1c and hyperglycemia noted here  · Monitor on Accu-Cheks with basal bolus regimen but titrate up as needed  · ADA diet

## 2022-07-21 NOTE — ASSESSMENT & PLAN NOTE
· Patient noted on imaging to have small pneumothorax at [facility], subsequently transferred here to have placement of chest tube  · Repeat chest x-rays done with slight increase in PTX  · Consult from pulmonology appreciated  IR consulted --placed chest tube on June 20th  Patient now with notable pain and had some bleeding from the dressing    Will add pain regiment  · Chest tube management by pulmonology with serial chest x-rays, await determination of when to remove

## 2022-07-21 NOTE — ASSESSMENT & PLAN NOTE
· Continue holding supplemental potassium, spironolactone, and ARB  Dose of Kayexalate was provided yesterday    BMP now reflects normal potassium 4 5  · Recheck BMP tomorrow  · Already on low potassium diet

## 2022-07-21 NOTE — PLAN OF CARE
Problem: PAIN - ADULT  Goal: Verbalizes/displays adequate comfort level or baseline comfort level  Description: Interventions:  - Encourage patient to monitor pain and request assistance  - Assess pain using appropriate pain scale  - Administer analgesics based on type and severity of pain and evaluate response  - Implement non-pharmacological measures as appropriate and evaluate response  - Consider cultural and social influences on pain and pain management  - Notify physician/advanced practitioner if interventions unsuccessful or patient reports new pain  Outcome: Progressing     Problem: INFECTION - ADULT  Goal: Absence or prevention of progression during hospitalization  Description: INTERVENTIONS:  - Assess and monitor for signs and symptoms of infection  - Monitor lab/diagnostic results  - Monitor all insertion sites, i e  indwelling lines, tubes, and drains  - Monitor endotracheal if appropriate and nasal secretions for changes in amount and color  - [place] appropriate cooling/warming therapies per order  - Administer medications as ordered  - Instruct and encourage patient and family to use good hand hygiene technique  - Identify and instruct in appropriate isolation precautions for identified infection/condition  Outcome: Progressing     Problem: INFECTION - ADULT  Goal: Absence of fever/infection during neutropenic period  Description: INTERVENTIONS:  - Monitor WBC    Outcome: Progressing

## 2022-07-21 NOTE — PROGRESS NOTES
Progress Note - Pulmonary   Giacomo Lester 65 y o  female MRN: [id number]  Unit/Bed#: S -01 Encounter: [id number]    Assessment/Plan:    1  Acute pulmonary insufficiency on chronic hypoxic respiratory failure       -  currently on home O2 requirements of 10 L trach collar-98%       -  continue saturations greater than 89%       -  pulmonary toileting:  Deep breathing cough, OOB as tolerated, IS Q 1 hr    2  Primary spontaneous pneumothorax       -  etiology unclear secondary to possible coughing with possible blood       -  7/20/2022- 10 Fr CT        -  no bubbling noted in chamber walk coughing, will place to water seal repeat chest x-ray in a m        -  please notify any increasing shortness of breath or pain will be attached to suction    3  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        -  1/13/2022- tracheotomy placed        -  6 Shiley uncuffed        -  Will continue trac care/ suction as needed    4  Suspected COPD of unknown severity without acute exacerbation       -  Inpatient: albuterol as needed       -  Home regimen:  Albuterol q 6h p r n        -  no indication for steroids at this time    5  Suspected dysphagia       -  7/21/2022-  VBS pending       -  continue aspiration precautions       -  monitor antibiotics    6  Mild CHANTALE       -  james w/ Dr Chaneta Collet       -  please call BiPAP therapy while chest tube/pneumothorax        Chief Complaint:    "I am sore"    Subjective:    Kelli was comfortably sitting in her bed  She reports that she is having some pleuritic chest pain with the chest tube is  No significant overnight events reported  Patient currently denying any fevers, chills hemoptysis, headaches night sweats, or palpitations  Objective:    Vitals: Blood pressure 128/73, pulse 58, temperature 98 1 °F (36 7 °C), resp  rate 18, weight 77 1 kg (170 lb), SpO2 98 %  RA,Body mass index is 25 1 kg/m²        Intake/Output Summary (Last 24 hours) at 7/21/2022 [id number]-B [facility] filed at 7/21/2022 0539  Gross per 24 hour   Intake --   Output 600 ml   Net -600 ml       Invasive Devices  Report    Peripheral Intravenous Line  Duration           Peripheral IV 07/20/22 Right Antecubital 1 day          Drain  Duration           Chest Tube 1 Right Other (Comment) 10 Fr  <1 day          Airway  Duration           Surgical Airway Shiley Fenestrated 141 days                Physical Exam:   Physical Exam  Constitutional:       General: She is not in acute distress  Appearance: Normal appearance  She is normal weight  She is not ill-appearing  HENT:      Head: Normocephalic and atraumatic  Nose: Nose normal  No congestion or rhinorrhea  Mouth/Throat:      Mouth: Mucous membranes are dry  Cardiovascular:      Rate and Rhythm: Normal rate and regular rhythm  Pulses: Normal pulses  Heart sounds: Normal heart sounds  No murmur heard  No friction rub  No gallop  Pulmonary:      Effort: Pulmonary effort is normal  No tachypnea, bradypnea or respiratory distress  Breath sounds: Decreased air movement present  No stridor  Decreased breath sounds and rhonchi present  No wheezing or rales  Comments: Scattered rhonchi  Chest:      Chest wall: No tenderness  Abdominal:      General: Abdomen is flat  Bowel sounds are normal       Palpations: Abdomen is soft  Musculoskeletal:         General: No swelling or tenderness  Normal range of motion  Cervical back: Normal range of motion  No rigidity or tenderness  Skin:     General: Skin is warm and dry  Coloration: Skin is not jaundiced or pale  Neurological:      General: No focal deficit present  Mental Status: She is alert and oriented to person, place, and time  Mental status is at baseline  Psychiatric:         Mood and Affect: Mood normal          Behavior: Behavior normal          Labs:    I have personally reviewed pertinent lab results, CMP:   Lab Results   Component Value Date SODIUM 134 (L) 07/21/2022    K 4 5 07/21/2022    CL 96 07/21/2022    CO2 31 07/21/2022    BUN 42 (H) 07/21/2022    CREATININE 1 26 07/21/2022    CALCIUM 9 2 07/21/2022    EGFR 47 07/21/2022       Imaging and other studies: I have personally reviewed pertinent films in PACS     Chest x-ray-small right pneumothorax

## 2022-07-21 NOTE — PLAN OF CARE
Problem: Potential for Falls  Goal: Patient will remain free of falls  Description: INTERVENTIONS:  - Educate patient/family on patient safety including physical limitations  - Instruct patient to call for assistance with activity   - Consult OT/PT to assist with strengthening/mobility   - Keep Call bell within reach  - Keep bed low and locked with side rails adjusted as appropriate  - Keep care items and personal belongings within reach  - Initiate and maintain comfort rounds  - Make Fall Risk Sign visible to staff  - Obtain necessary fall risk management equipment  - Apply yellow socks and bracelet for high fall risk patients  - Consider moving patient to room near nurses station  Outcome: Progressing     Problem: Nutrition/Hydration-ADULT  Goal: Nutrient/Hydration intake appropriate for improving, restoring or maintaining nutritional needs  Description: Monitor and assess patient's nutrition/hydration status for malnutrition  Collaborate with interdisciplinary team and initiate plan and interventions as ordered  Monitor patient's weight and dietary intake as ordered or per policy  Utilize nutrition screening tool and intervene as necessary  Determine patient's food preferences and provide high-protein, high-caloric foods as appropriate       INTERVENTIONS:  - Monitor oral intake, urinary output, labs, and treatment plans  - Assess nutrition and hydration status and recommend course of action  - Evaluate amount of meals eaten  - Assist patient with eating if necessary   - Allow adequate time for meals  - Recommend/ encourage appropriate diets, oral nutritional supplements, and vitamin/mineral supplements  - Order, calculate, and assess calorie counts as needed  - Recommend, monitor, and adjust tube feedings and TPN/PPN based on assessed needs  - Assess need for intravenous fluids  - Provide specific nutrition/hydration education as appropriate  - Include patient/family/caregiver in decisions related to nutrition  Outcome: Progressing     Problem: PAIN - ADULT  Goal: Verbalizes/displays adequate comfort level or baseline comfort level  Description: Interventions:  - Encourage patient to monitor pain and request assistance  - Assess pain using appropriate pain scale  - Administer analgesics based on type and severity of pain and evaluate response  - Implement non-pharmacological measures as appropriate and evaluate response  - Consider cultural and social influences on pain and pain management  - Notify physician/advanced practitioner if interventions unsuccessful or patient reports new pain  Outcome: Progressing     Problem: INFECTION - ADULT  Goal: Absence or prevention of progression during hospitalization  Description: INTERVENTIONS:  - Assess and monitor for signs and symptoms of infection  - Monitor lab/diagnostic results  - Monitor all insertion sites, i e  indwelling lines, tubes, and drains  - Monitor endotracheal if appropriate and nasal secretions for changes in amount and color  - [place] appropriate cooling/warming therapies per order  - Administer medications as ordered  - Instruct and encourage patient and family to use good hand hygiene technique  - Identify and instruct in appropriate isolation precautions for identified infection/condition  Outcome: Progressing  Goal: Absence of fever/infection during neutropenic period  Description: INTERVENTIONS:  - Monitor WBC    Outcome: Progressing     Problem: SAFETY ADULT  Goal: Patient will remain free of falls  Description: INTERVENTIONS:  - Educate patient/family on patient safety including physical limitations  - Instruct patient to call for assistance with activity   - Consult OT/PT to assist with strengthening/mobility   - Keep Call bell within reach  - Keep bed low and locked with side rails adjusted as appropriate  - Keep care items and personal belongings within reach  - Initiate and maintain comfort rounds  - Make Fall Risk Sign visible to staff  - Initiate/Maintain bed/chair alarm  - Obtain necessary fall risk management equipment  - Apply yellow socks and bracelet for high fall risk patients  - Consider moving patient to room near nurses station  Outcome: Progressing  Goal: Maintain or return to baseline ADL function  Description: INTERVENTIONS:  -  Assess patient's ability to carry out ADLs; assess patient's baseline for ADL function and identify physical deficits which impact ability to perform ADLs (bathing, care of mouth/teeth, toileting, grooming, dressing, etc )  - Assess/evaluate cause of self-care deficits   - Assess range of motion  - Assess patient's mobility; develop plan if impaired  - Assess patient's need for assistive devices and provide as appropriate  - Encourage maximum independence but intervene and supervise when necessary  - Involve family in performance of ADLs  - Assess for home care needs following discharge   - Consider OT consult to assist with ADL evaluation and planning for discharge  - Provide patient education as appropriate  Outcome: Progressing  Goal: Maintains/Returns to pre admission functional level  Description: INTERVENTIONS:  - Perform BMAT or MOVE assessment daily    - Set and communicate daily mobility goal to care team and patient/family/caregiver     - Collaborate with rehabilitation services on mobility goals if consulted  - Ambulate patient 2 times a day  - Out of bed for meals 3 times a day  - Out of bed for toileting  - Record patient progress and toleration of activity level   Outcome: Progressing     Problem: DISCHARGE PLANNING  Goal: Discharge to home or other facility with appropriate resources  Description: INTERVENTIONS:  - Identify barriers to discharge w/patient and caregiver  - Arrange for needed discharge resources and transportation as appropriate  - Identify discharge learning needs (meds, wound care, etc )  - Arrange for interpretive services to assist at discharge as needed  - Refer to Case Management Department for coordinating discharge planning if the patient needs post-hospital services based on physician/advanced practitioner order or complex needs related to functional status, cognitive ability, or social support system  Outcome: Progressing     Problem: Knowledge Deficit  Goal: Patient/family/caregiver demonstrates understanding of disease process, treatment plan, medications, and discharge instructions  Description: Complete learning assessment and assess knowledge base  Interventions:  - Provide teaching at level of understanding  - Provide teaching via preferred learning methods  Outcome: Progressing     Problem: MOBILITY - ADULT  Goal: Maintain or return to baseline ADL function  Description: INTERVENTIONS:  -  Assess patient's ability to carry out ADLs; assess patient's baseline for ADL function and identify physical deficits which impact ability to perform ADLs (bathing, care of mouth/teeth, toileting, grooming, dressing, etc )  - Assess/evaluate cause of self-care deficits   - Assess range of motion  - Assess patient's mobility; develop plan if impaired  - Assess patient's need for assistive devices and provide as appropriate  - Encourage maximum independence but intervene and supervise when necessary  - Involve family in performance of ADLs  - Assess for home care needs following discharge   - Consider OT consult to assist with ADL evaluation and planning for discharge  - Provide patient education as appropriate  Outcome: Progressing  Goal: Maintains/Returns to pre admission functional level  Description: INTERVENTIONS:  - Perform BMAT or MOVE assessment daily    - Set and communicate daily mobility goal to care team and patient/family/caregiver     - Collaborate with rehabilitation services on mobility goals if consulted  - Ambulate patient 2 times a day  - Out of bed for meals 3 times a day  - Out of bed for toileting  - Record patient progress and toleration of activity level Outcome: Progressing

## 2022-07-21 NOTE — APP STUDENT NOTE
CHELSEA STUDENT  Inpatient Progress Note for TRAINING ONLY  Not Part of Legal Medical Record       Progress Note - Brittanie Meo 65 y o  female MRN: [id number]    Unit/Bed#: S -01 Encounter: [id number]      Assessment:  1  *Primary spontaneous pneumothorax     2  Acute kidney injury w/ stage 3 chronic kidney disease     3  Hyperkalemia     4  Chronic respiratory failure w/ hypoxia and tracheostomy     5  Type 2 diabetes mellitus     6  Heart failure w/ reduced ejection fraction     7  Atrial Fibrillation     8  History of cardiac arrest     Plan:  1  *Primary spontaneous pneumothorax  · Patient transferred from [place] [address] due to a right apical spontaneous pneumothorax and in need for proper placement of a chest tube with IR    · On arrival, patient was not in any acute respiratory distress  SpO2 >94% on 10 L of O2 with trach collar over tracheostomy placement  · Chest xray on arrival noted "Small right pneumothorax not significantly changed   Patchy bibasilar atelectasis "  · Chest tube placement placement yesterday afternoon with IR  · Some complaint of pain and placement site bleeding last night, repeat chest X-rays yesterday and today have showed proper placement of chest tube and pneumothorax remains stable in size  · Awaiting pulmonology appreciation of pneumothorax size/thoughts of chest tube   · No acute respiratory distress, no shortness of breath, moderate rhonchi throughout lungs  · Monitor SpO2, incentive spirometry, oxygen supplimentation     2  Acute kidney injury on stage 3 chronic kidney disease  · Creatinine 1 26 this morning, improved from 1 36 yesterday  · Avoid nephrotoxic drugs  · ARB and spironolactone put on hold for now  · Diuretics on hold  · Monitor BMP every few hours     3   Hyperkalemia  · Potassium 4 5 today  · ARB, spironolactone, and potassium supplementation put on hold  · Low-potassium diet initiated  · Kayexalate 15 mg PO once initiated to lower potassium levels, no longer needs this     4  Chronic respiratory failure w/ hypoxia and tracheostomy  · Patient visits ED regularly for suction of tracheostomy  Last visit at [address] [facility], patient had shortness of breath and chest pain on presentation  · Work-up ruled out a cardiac pathology  · Noncompliant on medication, multiple chronic conditions, severe anxiety  · First received tracheostomy after a cardiac arrest in November 2021  · Continue suction of trach, maintain SpO2 above 88%, trach care of trach collar, supplemental O2  · Continue atrovent, xopenex, and prn albuterol     5  Type 2 diabetes mellitus  · Patient hyperglycemic 278 today  · Continue with sliding scale insulin coverage as needed for hyperglycemia  · POC Glucose every 4 hours  · Monitor BMP closely     6  Heart failure w/ reduced ejection fraction  · Most recent ECHO on 02/24, estimated ejection fraction is 50%, moderate hypokinesis of the inferior and the basal anterolateral walls, atrium mildly dilated  · Euvolemic at this time  · Can resume spironolactone, bumex, and ARB once potassium normalizes and remains stable  · Daily weights, I&Os     7  Atrial Fibrillation  · Continue on Eliquis, if hemoptysis continues then hold  · Continue metoprolol treatment     8  History of cardiac arrest  · Patient has a history of cardiac arrest from November 2021  · Remained in the hospital for over a month  · Currently wearing Life Vest for risk of ventricular tachycardia      Subjective:   Patient complaining of pain at the site of chest tube placement today  States she noted some bleeding around it last night as well  Really has been struggling with the pain today  Eating breakfast comfortably in bed, no issues with bowel movements or urinary retention  No complaints of shortness of breath, difficulty breathy, coughing, choking, chest pain, headaches, dizziness, weakness, fevers, chills, or abdominal pain       Objective:     Vitals: Blood pressure 128/73, pulse 58, temperature 98 1 °F (36 7 °C), resp  rate 18, weight 77 1 kg (170 lb), SpO2 98 %  ,Body mass index is 25 1 kg/m²  Intake/Output Summary (Last 24 hours) at 7/21/2022 1108  Last data filed at 7/21/2022 0539  Gross per 24 hour   Intake --   Output 600 ml   Net -600 ml     Physical Exam  Vitals and nursing note reviewed  Constitutional:       General: She is not in acute distress  Appearance: Normal appearance  She is obese  HENT:      Mouth/Throat:      Mouth: Mucous membranes are moist       Pharynx: Oropharynx is clear  Eyes:      Extraocular Movements: Extraocular movements intact  Pupils: Pupils are equal, round, and reactive to light  Cardiovascular:      Rate and Rhythm: Normal rate and regular rhythm  Pulses: Normal pulses  Heart sounds: Normal heart sounds  Pulmonary:      Effort: Pulmonary effort is normal  No respiratory distress  Breath sounds: Rhonchi present  Comments: -Bilateral rhonchi throughout  -Decreased breath sounds over right apical lung field  Chest:      Chest wall: No tenderness  Abdominal:      General: Bowel sounds are normal  There is no distension  Palpations: Abdomen is soft  Tenderness: There is no abdominal tenderness  Skin:     General: Skin is warm and dry  Capillary Refill: Capillary refill takes less than 2 seconds  Neurological:      General: No focal deficit present  Mental Status: She is alert and oriented to person, place, and time  Psychiatric:         Mood and Affect: Mood normal          Invasive Devices  Report    Peripheral Intravenous Line  Duration           Peripheral IV 07/20/22 Right Antecubital 1 day          Drain  Duration           Chest Tube 1 Right Other (Comment) 10 Fr  <1 day          Airway  Duration           Surgical Airway Shiley Fenestrated 141 days                Lab, Imaging and other studies: I have personally reviewed pertinent reports      VTE Pharmacologic Prophylaxis: VTE score: 3 LINDSEY Bass

## 2022-07-21 NOTE — PROGRESS NOTES
[facility]  Progress Note - Michelle Roa 1965, 65 y o  female MRN: [id number]  Unit/Bed#: S -01 Encounter: [id number]  Primary Care Provider: Trish Brito MD   Date and time admitted to hospital: 7/19/2022 11:33 AM    * Primary spontaneous pneumothorax  Assessment & Plan  · Patient noted on imaging to have small pneumothorax at [facility], subsequently transferred here to have placement of chest tube  · Repeat chest x-rays done with slight increase in PTX  · Consult from pulmonology appreciated  IR consulted --placed chest tube on June 20th  Patient now with notable pain and had some bleeding from the dressing  Will add pain regiment  · Chest tube management by pulmonology with serial chest x-rays, await determination of when to remove    Acute kidney injury superimposed on chronic kidney disease stage 3 [facility])  Assessment & Plan  Lab Results   Component Value Date    EGFR 47 07/21/2022    EGFR 43 07/20/2022    EGFR 43 07/20/2022    CREATININE 1 26 07/21/2022    CREATININE 1 37 (H) 07/20/2022    CREATININE 1 36 (H) 07/20/2022   · Creatinine elevated at 1 64 upon transfer on 7/19/22, with baseline being 0 9-1  Improved today to 1 26    Hyperkalemia  Assessment & Plan  · Continue holding supplemental potassium, spironolactone, and ARB  Dose of Kayexalate was provided yesterday  BMP now reflects normal potassium 4 5  · Recheck BMP tomorrow  · Already on low potassium diet     Shortness of breath  Assessment & Plan  · Patient reportedly presented to the hospital initially with shortness of breath and chest pain  She carries multiple significant underlying diagnoses including COPD, congestive heart failure, CHANTALE; She recently had COVID 2 months ago and has a tracheostomy   Now reports no shortness of breath  · Avoid BiPAP for now given PTX  · Very minimal procal elevation and mild leukocytosis- will observe off antibiotics  · Pulmonology consult appreciated, case d/w them    Acute on chronic HFrEF (heart failure with reduced ejection fraction) ([facility])  Assessment & Plan  Wt Readings from Last 3 Encounters:   07/21/22 77 1 kg (170 lb)   07/19/22 78 2 kg (172 lb 6 4 oz)   06/29/22 81 7 kg (180 lb 3 2 oz)   · EF noted to be 50% on last echocardiogram earlier this year  · Received IV lasix upon arrival there for shortness of breath  · Appeared euvolemic upon arrival here  Diuretics were being held due to acute kidney injury  Given that creatinine is now resolving close to baseline would recommend we resume her Bumex at half dose 1 mg p o  B i d  for now to prevent heart failure exacerbation and reassess coli to her usual dose soon as tolerated        Tracheostomy in place [facility])  Assessment & Plan  · Trach placed in the context of cardiac arrest and prolonged hospitalization November 2021  Apparently patient comes to the hospital frequently for suctioning  Had some bleeding from the trach at [facility], now resolved   Also had trach replaced there  · Per d/w speech therapy, will do video barium swallow today as it was due to be done for sense of food getting stuck  · On trach collar O2 with humidification    Type 2 diabetes mellitus with hyperglycemia [facility])  Assessment & Plan  Lab Results   Component Value Date    HGBA1C 12 7 (H) 05/22/2022     Recent Labs     07/20/22  1109 07/20/22  1614 07/20/22  2100 07/21/22  0741   POCGLU 260* 213* 167* 278*     Blood Sugar Average: Last 72 hrs:  · (P) 269very poorly controlled based on most recent A1c and hyperglycemia noted here  · Monitor on Accu-Cheks with basal bolus regimen but titrate up as needed  · ADA diet    A-fib ([facility])  Assessment & Plan  · On anticoagulation with Eliquis, cautiously given bleeding at chest tube site  · On amiodarone and Toprol    CAD (coronary artery disease)  Assessment & Plan  · History of MI oct 2021  · On beta-blocker, Brilinta  · Apparently complained of chest pain on admission to [facility] but troponins were negative and EKG was nonischemic    History of Cardiac arrest [facility])  Assessment & Plan  · Patient apparently with history of [place] cardiac arrest 2021  Now with life vest--must be on telemetry if LifeVest off  Unclear why she has not yet followed up with EP to have ICD  Defer to outpatient cardiology      VTE Pharmacologic Prophylaxis: VTE Score: 3 Eliquis    Patient Centered Rounds: I performed bedside rounds with nursing staff today  Discussions with Specialists or Other Care Team Provider:     Education and Discussions with Family / Patient:  Left a message with her son  Time Spent for Care: 25 min  More than 50% of total time spent on counseling and coordination of care as described above  Current Length of Stay: 2 day(s)  Current Patient Status: Inpatient   Certification Statement: The patient will continue to require additional inpatient hospital stay due to Chest tube management  Discharge Plan: When cleared by pulmonology    Code Status: Level 1 - Full Code    Subjective:   Patient had bleeding on her dressing site according to nursing last night who showed me an image  The dressing was changed and the bleeding has stopped  Patient did have pain last night due to the tube and reports she is still having pain now  No reports of shortness of breath  Objective:     Vitals:   Temp (24hrs), Av 1 °F (36 7 °C), Min:98 1 °F (36 7 °C), Max:98 1 °F (36 7 °C)    Temp:  [98 1 °F (36 7 °C)] 98 1 °F (36 7 °C)  HR:  [58-69] 58  Resp:  [16-18] 18  BP: (121-145)/(73-85) 128/73  SpO2:  [97 %-100 %] 98 %  Body mass index is 25 1 kg/m²  Input and Output Summary (last 24 hours): Intake/Output Summary (Last 24 hours) at 2022 0918  Last data filed at 2022 0539  Gross per 24 hour   Intake --   Output 600 ml   Net -600 ml       Physical Exam:   Physical Exam  Vitals reviewed  Constitutional:       General: She is not in acute distress       Appearance: She is not toxic-appearing or diaphoretic  Comments: Nontoxic but appears older than stated age   Eyes:      General: No scleral icterus  Right eye: No discharge  Left eye: No discharge  Conjunctiva/sclera: Conjunctivae normal    Cardiovascular:      Rate and Rhythm: Normal rate and regular rhythm  Heart sounds: No murmur heard  Pulmonary:      Effort: No respiratory distress  Breath sounds: No stridor  No wheezing or rhonchi  Comments: Dressing is dry with no bleeding  Abdominal:      General: There is no distension  Palpations: Abdomen is soft  Tenderness: There is no abdominal tenderness  There is no guarding  Musculoskeletal:      Right lower leg: No edema  Left lower leg: No edema  Skin:     General: Skin is warm and dry  Coloration: Skin is not jaundiced or pale  Findings: No bruising, erythema, lesion or rash  Neurological:      Mental Status: She is alert  Mental status is at baseline  Comments: Awake alert interactive patient communicates by mouth things words  No evidence of confusion   Psychiatric:         Mood and Affect: Mood normal          Thought Content:  Thought content normal           Additional Data:     Labs:  Results from last 7 days   Lab Units 07/20/22  0430   WBC Thousand/uL 12 03*   HEMOGLOBIN g/dL 9 9*   HEMATOCRIT % 32 9*   PLATELETS Thousands/uL 542*   NEUTROS PCT % 63   LYMPHS PCT % 14   MONOS PCT % 9   EOS PCT % 12*     Results from last 7 days   Lab Units 07/21/22  0542 07/19/22  0510 07/18/22  0513 07/16/22  0444 07/15/22  0334   SODIUM mmol/L 134*   < > 136   < > 134*   POTASSIUM mmol/L 4 5   < > 4 3   < > 3 5   CHLORIDE mmol/L 96   < > 93*   < > 94*   CO2 mmol/L 31   < > 32   < > 30   BUN mg/dL 42*   < > 32*   < > 12   CREATININE mg/dL 1 26   < > 1 30   < > 0 83   ANION GAP mmol/L 7   < > 11   < > 10   CALCIUM mg/dL 9 2   < > 9 6   < > 9 1   ALBUMIN g/dL  --   --  3 2*   < > 3 5   TOTAL BILIRUBIN mg/dL  --   --   --   --  0 51   ALK PHOS U/L  --   --   --   --  95   ALT U/L  --   --   --   --  3*   AST U/L  --   --   --   --  4*   GLUCOSE RANDOM mg/dL 217*   < > 194*   < > 452*    < > = values in this interval not displayed           Results from last 7 days   Lab Units 07/21/22  0741 07/20/22  2100 07/20/22  1614 07/20/22  1109 07/20/22  0742 07/19/22  2104 07/19/22  1636 07/19/22  0747 07/18/22  2141 07/18/22  1723 07/18/22  1148 07/18/22  0651   POC GLUCOSE mg/dl 278* 167* 213* 260* 345* 360* 297* 309* 261* 277* 197* 267*         Results from last 7 days   Lab Units 07/19/22  1359 07/19/22  0510 07/18/22  0843   PROCALCITONIN ng/ml 0 22 0 27* 0 24       Lines/Drains:  Invasive Devices  Report    Peripheral Intravenous Line  Duration           Peripheral IV 07/20/22 Right Antecubital 1 day          Drain  Duration           Chest Tube 1 Right Other (Comment) 10 Fr  <1 day          Airway  Duration           Surgical Airway Shiley Fenestrated 141 days                  Telemetry:  Telemetry Orders (From admission, onward)             LifeVest Patient: Continuous Telemetry Monitoring during hospitalization (non-expiring)  Continuous LifeVest Telemetry Monitoring        References:    LifeVest Policy                 Telemetry Reviewed:  No events  Indication for Continued Telemetry Use:  Life vest             Imaging: Reviewed radiology reports from this admission including: chest xray    Recent Cultures (last 7 days):         Last 24 Hours Medication List:   Current Facility-Administered Medications   Medication Dose Route Frequency Provider Last Rate    acetaminophen  975 mg Oral Q6H [facility] Leda Chand PA-C      albuterol  2 5 mg Nebulization Q4H PRN Leda Chand PA-C      amiodarone  100 mg Oral Daily With Breakfast Leda Chand PA-C      apixaban  5 mg Oral BID Leda Chand PA-C      atorvastatin  40 mg Oral Daily With [employer]DONALD      benzonatate  100 mg Oral TID PRN Edel Bearden MD      bumetanide  1 mg Oral BID eLda Chand, DONALD      escitalopram  10 mg Oral Daily Leda Chand, PA-GAIL      ferrous sulfate  325 mg Oral Daily With Breakfast Leda Chand, DONALD      guaiFENesin  1,200 mg Oral Q12H [facility] Leda Chand, DONALD      insulin glargine  30 Units Subcutaneous HS Leda Chand, PA-GAIL      insulin lispro  12 Units Subcutaneous TID With Meals Leda Chand, PA-GAIL      insulin lispro  2-12 Units Subcutaneous TID AC Leda Chand, PA-C      insulin lispro  2-12 Units Subcutaneous HS Leda Chand, DONALD      ipratropium  0 5 mg Nebulization TID Pankaj Gonzalez PA-C      levalbuterol  1 25 mg Nebulization TID Leda Chand, DONALD      LORazepam  0 5 mg Oral Q8H PRN Leda Chand, PA-GAIL      metoprolol succinate  50 mg Oral Q12H Leda Chand, DONALD      oxyCODONE  2 5 mg Oral Q4H PRN Leda Chand, PA-GAIL      pantoprazole  40 mg Oral Early Morning Leda Chand, PA-GAIL      pregabalin  75 mg Oral Daily Leda Chand, DONALD      ticagrelor  90 mg Oral Q12H Leda Chand, DONALD          Today, Patient Was Seen By: Pankaj Gonzalez PA-C    **Please Note: This note may have been constructed using a voice recognition system  **

## 2022-07-21 NOTE — ASSESSMENT & PLAN NOTE
· Trach placed in the context of cardiac arrest and prolonged hospitalization November 2021  Apparently patient comes to the hospital frequently for suctioning  Had some bleeding from the trach at [facility], now resolved   Also had trach replaced there  · Per d/w speech therapy, will do video barium swallow today as it was due to be done for sense of food getting stuck  · On trach collar O2 with humidification

## 2022-07-21 NOTE — QUICK NOTE
Informed by RN, patient s/p R CT placement today with IR with shallow respirations and blood soaked dressing  VS stable and no hypoxia  Upon my evaluation, patient resting in bed, notes some discomfort at the site but at baseline oxygen requirements  Discussed with CC AP- CXR ordered with CT in adequate placement and with improvement of pneumothorax  No evidence of re-expansion edema  Dressing changed by CC, will place on scheduled Tylenol for pain

## 2022-07-21 NOTE — NURSING NOTE
Noted to have new bloody drainage to chest tube dressing  Patient states 5/10 pain to insertion site  /83 HR 70 SPO2 99% on trach mask Fio2 28%  Information relayed tyo SLIM and bedside report with oncoming RN

## 2022-07-21 NOTE — ASSESSMENT & PLAN NOTE
Lab Results   Component Value Date    EGFR 47 07/21/2022    EGFR 43 07/20/2022    EGFR 43 07/20/2022    CREATININE 1 26 07/21/2022    CREATININE 1 37 (H) 07/20/2022    CREATININE 1 36 (H) 07/20/2022   · Creatinine elevated at 1 64 upon transfer on 7/19/22, with baseline being 0 9-1   Improved today to 1 26

## 2022-07-21 NOTE — ASSESSMENT & PLAN NOTE
· History of MI oct 2021  · On beta-blocker, Brilinta  · Apparently complained of chest pain on admission to [place] but troponins were negative and EKG was nonischemic

## 2022-07-21 NOTE — ASSESSMENT & PLAN NOTE
· Patient reportedly presented to the hospital initially with shortness of breath and chest pain  She carries multiple significant underlying diagnoses including COPD, congestive heart failure, CHANTALE; She recently had COVID 2 months ago and has a tracheostomy   Now reports no shortness of breath  · Avoid BiPAP for now given PTX  · Very minimal procal elevation and mild leukocytosis- will observe off antibiotics  · Pulmonology consult appreciated, case d/w them

## 2022-07-21 NOTE — ASSESSMENT & PLAN NOTE
· On anticoagulation with Eliquis, cautiously given bleeding at chest tube site  · On amiodarone and Toprol

## 2022-07-21 NOTE — ASSESSMENT & PLAN NOTE
Wt Readings from Last 3 Encounters:   07/21/22 77 1 kg (170 lb)   07/19/22 78 2 kg (172 lb 6 4 oz)   06/29/22 81 7 kg (180 lb 3 2 oz)   · EF noted to be 50% on last echocardiogram earlier this year  · Received IV lasix upon arrival there for shortness of breath  · Appeared euvolemic upon arrival here  Diuretics were being held due to acute kidney injury    Given that creatinine is now resolving close to baseline would recommend we resume her Bumex at half dose 1 mg p o  B i d  for now to prevent heart failure exacerbation and reassess coli to her usual dose soon as tolerated

## 2022-07-22 ENCOUNTER — APPOINTMENT (INPATIENT)
Dept: RADIOLOGY | Facility: HOSPITAL | Age: 57
DRG: 199 | End: 2022-07-22
Payer: COMMERCIAL

## 2022-07-22 LAB
ANION GAP SERPL CALCULATED.3IONS-SCNC: 10 MMOL/L (ref 4–13)
BUN SERPL-MCNC: 45 MG/DL (ref 5–25)
CALCIUM SERPL-MCNC: 8.8 MG/DL (ref 8.4–10.2)
CHLORIDE SERPL-SCNC: 98 MMOL/L (ref 96–108)
CO2 SERPL-SCNC: 27 MMOL/L (ref 21–32)
CREAT SERPL-MCNC: 1.24 MG/DL (ref 0.6–1.3)
GFR SERPL CREATININE-BSD FRML MDRD: 48 ML/MIN/1.73SQ M
GLUCOSE SERPL-MCNC: 212 MG/DL (ref 65–140)
GLUCOSE SERPL-MCNC: 219 MG/DL (ref 65–140)
GLUCOSE SERPL-MCNC: 258 MG/DL (ref 65–140)
GLUCOSE SERPL-MCNC: 267 MG/DL (ref 65–140)
GLUCOSE SERPL-MCNC: 272 MG/DL (ref 65–140)
POTASSIUM SERPL-SCNC: 4.1 MMOL/L (ref 3.5–5.3)
SODIUM SERPL-SCNC: 135 MMOL/L (ref 135–147)

## 2022-07-22 PROCEDURE — 32557 INSERT CATH PLEURA W/ IMAGE: CPT | Performed by: RADIOLOGY

## 2022-07-22 PROCEDURE — C1769 GUIDE WIRE: HCPCS

## 2022-07-22 PROCEDURE — 80048 BASIC METABOLIC PNL TOTAL CA: CPT | Performed by: PHYSICIAN ASSISTANT

## 2022-07-22 PROCEDURE — 0W9930Z DRAINAGE OF RIGHT PLEURAL CAVITY WITH DRAINAGE DEVICE, PERCUTANEOUS APPROACH: ICD-10-PCS | Performed by: RADIOLOGY

## 2022-07-22 PROCEDURE — 32999 UNLISTED PX LUNGS & PLEURA: CPT

## 2022-07-22 PROCEDURE — 99232 SBSQ HOSP IP/OBS MODERATE 35: CPT | Performed by: PHYSICIAN ASSISTANT

## 2022-07-22 PROCEDURE — 99232 SBSQ HOSP IP/OBS MODERATE 35: CPT | Performed by: INTERNAL MEDICINE

## 2022-07-22 PROCEDURE — C1729 CATH, DRAINAGE: HCPCS

## 2022-07-22 PROCEDURE — 71045 X-RAY EXAM CHEST 1 VIEW: CPT

## 2022-07-22 PROCEDURE — 94760 N-INVAS EAR/PLS OXIMETRY 1: CPT

## 2022-07-22 PROCEDURE — 94640 AIRWAY INHALATION TREATMENT: CPT

## 2022-07-22 PROCEDURE — NC001 PR NO CHARGE: Performed by: RADIOLOGY

## 2022-07-22 PROCEDURE — 82948 REAGENT STRIP/BLOOD GLUCOSE: CPT

## 2022-07-22 RX ORDER — OXYCODONE HYDROCHLORIDE 5 MG/1
5 TABLET ORAL EVERY 6 HOURS PRN
Status: DISCONTINUED | OUTPATIENT
Start: 2022-07-22 | End: 2022-07-22

## 2022-07-22 RX ORDER — INSULIN GLARGINE 100 [IU]/ML
33 INJECTION, SOLUTION SUBCUTANEOUS
Status: DISCONTINUED | OUTPATIENT
Start: 2022-07-22 | End: 2022-07-27

## 2022-07-22 RX ORDER — METHOCARBAMOL 500 MG/1
500 TABLET, FILM COATED ORAL EVERY 6 HOURS SCHEDULED
Status: DISCONTINUED | OUTPATIENT
Start: 2022-07-22 | End: 2022-07-23

## 2022-07-22 RX ORDER — INSULIN LISPRO 100 [IU]/ML
14 INJECTION, SOLUTION INTRAVENOUS; SUBCUTANEOUS
Status: DISCONTINUED | OUTPATIENT
Start: 2022-07-22 | End: 2022-07-23

## 2022-07-22 RX ORDER — HYDROMORPHONE HYDROCHLORIDE 2 MG/1
2 TABLET ORAL EVERY 4 HOURS PRN
Status: DISCONTINUED | OUTPATIENT
Start: 2022-07-22 | End: 2022-07-23

## 2022-07-22 RX ORDER — FENTANYL CITRATE 50 UG/ML
INJECTION, SOLUTION INTRAMUSCULAR; INTRAVENOUS CODE/TRAUMA/SEDATION MEDICATION
Status: COMPLETED | OUTPATIENT
Start: 2022-07-22 | End: 2022-07-29

## 2022-07-22 RX ORDER — LIDOCAINE 50 MG/G
1 PATCH TOPICAL DAILY
Status: DISCONTINUED | OUTPATIENT
Start: 2022-07-22 | End: 2022-07-24

## 2022-07-22 RX ADMIN — BUMETANIDE 1 MG: 1 TABLET ORAL at 18:01

## 2022-07-22 RX ADMIN — IPRATROPIUM BROMIDE 0.5 MG: 0.5 SOLUTION RESPIRATORY (INHALATION) at 13:49

## 2022-07-22 RX ADMIN — BUMETANIDE 1 MG: 1 TABLET ORAL at 08:36

## 2022-07-22 RX ADMIN — INSULIN LISPRO 4 UNITS: 100 INJECTION, SOLUTION INTRAVENOUS; SUBCUTANEOUS at 13:29

## 2022-07-22 RX ADMIN — ACETAMINOPHEN 975 MG: 325 TABLET ORAL at 23:22

## 2022-07-22 RX ADMIN — BENZONATATE 100 MG: 100 CAPSULE ORAL at 21:32

## 2022-07-22 RX ADMIN — INSULIN LISPRO 6 UNITS: 100 INJECTION, SOLUTION INTRAVENOUS; SUBCUTANEOUS at 08:40

## 2022-07-22 RX ADMIN — INSULIN LISPRO 12 UNITS: 100 INJECTION, SOLUTION INTRAVENOUS; SUBCUTANEOUS at 08:40

## 2022-07-22 RX ADMIN — OXYCODONE HYDROCHLORIDE 2.5 MG: 5 TABLET ORAL at 02:29

## 2022-07-22 RX ADMIN — LORAZEPAM 0.5 MG: 0.5 TABLET ORAL at 15:08

## 2022-07-22 RX ADMIN — PREGABALIN 75 MG: 75 CAPSULE ORAL at 08:36

## 2022-07-22 RX ADMIN — TICAGRELOR 90 MG: 90 TABLET ORAL at 18:01

## 2022-07-22 RX ADMIN — METOPROLOL SUCCINATE 50 MG: 50 TABLET, EXTENDED RELEASE ORAL at 18:01

## 2022-07-22 RX ADMIN — LEVALBUTEROL HYDROCHLORIDE 1.25 MG: 1.25 SOLUTION, CONCENTRATE RESPIRATORY (INHALATION) at 07:15

## 2022-07-22 RX ADMIN — IPRATROPIUM BROMIDE 0.5 MG: 0.5 SOLUTION RESPIRATORY (INHALATION) at 20:10

## 2022-07-22 RX ADMIN — DESMOPRESSIN ACETATE 40 MG: 0.2 TABLET ORAL at 18:01

## 2022-07-22 RX ADMIN — ESCITALOPRAM OXALATE 10 MG: 10 TABLET ORAL at 08:36

## 2022-07-22 RX ADMIN — APIXABAN 5 MG: 5 TABLET, FILM COATED ORAL at 18:01

## 2022-07-22 RX ADMIN — ACETAMINOPHEN 975 MG: 325 TABLET ORAL at 11:11

## 2022-07-22 RX ADMIN — LIDOCAINE 5% 1 PATCH: 700 PATCH TOPICAL at 15:56

## 2022-07-22 RX ADMIN — LEVALBUTEROL HYDROCHLORIDE 1.25 MG: 1.25 SOLUTION, CONCENTRATE RESPIRATORY (INHALATION) at 20:10

## 2022-07-22 RX ADMIN — APIXABAN 5 MG: 5 TABLET, FILM COATED ORAL at 08:36

## 2022-07-22 RX ADMIN — ACETAMINOPHEN 975 MG: 325 TABLET ORAL at 05:09

## 2022-07-22 RX ADMIN — LEVALBUTEROL HYDROCHLORIDE 1.25 MG: 1.25 SOLUTION, CONCENTRATE RESPIRATORY (INHALATION) at 13:49

## 2022-07-22 RX ADMIN — ACETAMINOPHEN 975 MG: 325 TABLET ORAL at 18:01

## 2022-07-22 RX ADMIN — INSULIN LISPRO 14 UNITS: 100 INJECTION, SOLUTION INTRAVENOUS; SUBCUTANEOUS at 18:03

## 2022-07-22 RX ADMIN — TICAGRELOR 90 MG: 90 TABLET ORAL at 05:09

## 2022-07-22 RX ADMIN — INSULIN LISPRO 6 UNITS: 100 INJECTION, SOLUTION INTRAVENOUS; SUBCUTANEOUS at 21:33

## 2022-07-22 RX ADMIN — FENTANYL CITRATE 100 MCG: 50 INJECTION, SOLUTION INTRAMUSCULAR; INTRAVENOUS at 09:19

## 2022-07-22 RX ADMIN — GUAIFENESIN 1200 MG: 600 TABLET ORAL at 20:22

## 2022-07-22 RX ADMIN — INSULIN LISPRO 6 UNITS: 100 INJECTION, SOLUTION INTRAVENOUS; SUBCUTANEOUS at 18:03

## 2022-07-22 RX ADMIN — INSULIN LISPRO 14 UNITS: 100 INJECTION, SOLUTION INTRAVENOUS; SUBCUTANEOUS at 13:29

## 2022-07-22 RX ADMIN — PANTOPRAZOLE SODIUM 40 MG: 40 TABLET, DELAYED RELEASE ORAL at 05:09

## 2022-07-22 RX ADMIN — AMIODARONE HYDROCHLORIDE 100 MG: 200 TABLET ORAL at 08:39

## 2022-07-22 RX ADMIN — HYDROMORPHONE HYDROCHLORIDE 2 MG: 2 TABLET ORAL at 15:56

## 2022-07-22 RX ADMIN — METHOCARBAMOL 500 MG: 500 TABLET ORAL at 23:22

## 2022-07-22 RX ADMIN — METHOCARBAMOL 500 MG: 500 TABLET ORAL at 18:01

## 2022-07-22 RX ADMIN — METOPROLOL SUCCINATE 50 MG: 50 TABLET, EXTENDED RELEASE ORAL at 05:13

## 2022-07-22 RX ADMIN — IPRATROPIUM BROMIDE 0.5 MG: 0.5 SOLUTION RESPIRATORY (INHALATION) at 07:15

## 2022-07-22 RX ADMIN — OXYCODONE HYDROCHLORIDE 5 MG: 5 TABLET ORAL at 11:14

## 2022-07-22 RX ADMIN — HYDROMORPHONE HYDROCHLORIDE 2 MG: 2 TABLET ORAL at 20:23

## 2022-07-22 RX ADMIN — GUAIFENESIN 1200 MG: 600 TABLET ORAL at 08:36

## 2022-07-22 RX ADMIN — INSULIN GLARGINE 33 UNITS: 100 INJECTION, SOLUTION SUBCUTANEOUS at 21:31

## 2022-07-22 RX ADMIN — FERROUS SULFATE TAB 325 MG (65 MG ELEMENTAL FE) 325 MG: 325 (65 FE) TAB at 08:39

## 2022-07-22 NOTE — ASSESSMENT & PLAN NOTE
· Trach placed in the context of cardiac arrest and prolonged hospitalization November 2021  Apparently patient comes to the hospital frequently for suctioning  Had some bleeding from the trach at [facility], now resolved  Also had trach replaced there  · Per d/w speech therapy,  video barium swallow was done for sense of food getting stuck   Appropriate for regular diet  · On trach collar O2 with humidification

## 2022-07-22 NOTE — PROGRESS NOTES
Progress Note - Pulmonary   Kelli Red 65 y o  female MRN: [id number]  Unit/Bed#: S -01 Encounter: [id number]    Assessment/Plan:    1  Acute pulmonary insufficiency on chronic hypoxic respiratory failure       -  patient currently on home O2 requirement 10 L trach collar 90%       -  maintain saturations greater than 89%       -  pulmonary toileting:  Deep breathing cough, OOB as tolerated, trach care, suction as needed    2  Primary spontaneous pneumothorax       -  etiology unclear secondary to possible coughing with possible blood       -  7/20/2022- 10 Fr  CT       -  7/22/2022- upsize 14 Fr  CT       -  will continue CT -20 continuous medium wall suction       -  significant pleuritic pain-consider pain management for nerve block       -  will repeat chest x-ray in a m  3  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       -  1/13/2022- , tracheostomy placed       -  maintained on 6 Shiley uncuffed       -  continue trach care and suction as needed    4  Suspected COPD of unknown severity without acute exacerbation        -  Inpatient:  Albuterol as needed        -  home regimen:  Albuterol q 6h p r n         -  no indication for steroids at this time    5  Suspected dysphagia       -  7/21/2022- VBS- regular diet       -  speech following    6  Mild CHANTALE       -  follows with Dr Molina Mandujano       -  please hold BiPAP therapy while chest tube/pneumothorax        Chief Complaint:    "I am in pain"    Subjective:    Kelli was sitting in her bed  Patient reports that she continues to have significant pleuritic pain  No significant overnight events reported  Patient currently denying any fevers, chills, hemoptysis, night sweats, palpitations, or headaches  Objective:    Vitals: Blood pressure 132/73, pulse 62, temperature 98 4 °F (36 9 °C), resp  rate 16, weight 77 6 kg (171 lb), SpO2 98 %  RA,Body mass index is 25 25 kg/m²      No intake or output data in the 24 hours ending 07/22/22 1054    Invasive Devices  Report    Peripheral Intravenous Line  Duration           Peripheral IV 07/20/22 Right Antecubital 2 days          Drain  Duration           Chest Tube 1 Right Other (Comment) 14 Fr  <1 day          Airway  Duration           Surgical Airway Shiley Fenestrated 142 days                Physical Exam:  Physical Exam  Constitutional:       General: She is not in acute distress  Appearance: Normal appearance  She is normal weight  She is not ill-appearing  HENT:      Head: Normocephalic and atraumatic  Nose: Nose normal  No congestion or rhinorrhea  Mouth/Throat:      Mouth: Mucous membranes are dry  Pharynx: No oropharyngeal exudate or posterior oropharyngeal erythema  Cardiovascular:      Rate and Rhythm: Normal rate and regular rhythm  Pulses: Normal pulses  Heart sounds: Normal heart sounds  No murmur heard  No friction rub  No gallop  Pulmonary:      Effort: Pulmonary effort is normal  No tachypnea, bradypnea, accessory muscle usage or respiratory distress  Breath sounds: Decreased air movement present  No stridor  Decreased breath sounds and rhonchi present  No wheezing or rales  Comments: Some scattered rhonchi  Chest:      Chest wall: No tenderness  Abdominal:      General: Abdomen is flat  Bowel sounds are normal  There is no distension  Palpations: Abdomen is soft  There is no mass  Musculoskeletal:         General: No swelling or tenderness  Normal range of motion  Cervical back: Normal range of motion  No rigidity or tenderness  Skin:     General: Skin is warm and dry  Coloration: Skin is not jaundiced or pale  Neurological:      General: No focal deficit present  Mental Status: She is alert and oriented to person, place, and time  Mental status is at baseline  Psychiatric:         Mood and Affect: Mood normal          Behavior: Behavior normal          Labs:    I have personally reviewed pertinent lab results CMP:   Lab Results   Component Value Date    SODIUM 135 07/22/2022    K 4 1 07/22/2022    CL 98 07/22/2022    CO2 27 07/22/2022    BUN 45 (H) 07/22/2022    CREATININE 1 24 07/22/2022    CALCIUM 8 8 07/22/2022    EGFR 48 07/22/2022       Imaging and other studies: I have personally reviewed pertinent films in PACS     Chest x-ray-small right pneumothorax

## 2022-07-22 NOTE — CASE MANAGEMENT
Case Management Assessment & Discharge Planning Note    Patient name Matias PERKINS /S -01 MRN [id number]  : 1965 Date 2022       Current Admission Date: 2022  Current Admission Diagnosis:Primary spontaneous pneumothorax   Patient Active Problem List    Diagnosis Date Noted    Hyponatremia 2022    Hyperkalemia 2022    Acute kidney injury superimposed on chronic kidney disease stage 3 ([address] ) 2022    Primary spontaneous pneumothorax 2022    Hemoptysis 2022    Hypomagnesemia 2022    Chest pain 2022    Moderate protein-calorie malnutrition ([address] ) 2022    Thoracic aortic aneurysm, ruptured ([facility] ) 2022    Shortness of breath 2022    Prolonged Q-T interval on ECG 2022    COVID-19 2022    Elevated troponin 2022    Microcytic anemia 2022    Insomnia secondary to anxiety 2022    Acute on chronic HFrEF (heart failure with reduced ejection fraction) ([facility] ) 2022    Chronic hypoxemic respiratory failure ([facility] ) 03/15/2022    Tracheostomy in place [facility]) 2022    Subglottic stenosis 2022    CAD (coronary artery disease) 2021    Urinary retention 2021    Cerebral aneurysm 2021    Cerebral vascular accident ([facility] ) 2021    Acute on chronic respiratory failure with hypoxia ([address] ) 2021    History of Cardiac arrest ([address] ) 2021    A-fib ([address] ) 10/30/2021    History of Ventricular tachycardia ([address] ) 10/27/2021    MODESTO (acute kidney injury) ([facility] ) 10/24/2021    Cellulitis of left lower extremity 2021    Hypoglycemia 2021    Polypharmacy 2021    Trigger finger, right middle finger 2021    Trigger finger, right ring finger 2021    Diarrhea 2021    Nasal vestibulitis 2021    Orthopnea 2021    CHANTALE (obstructive sleep apnea) 2021    Palpitations 2020    Abscess 10/11/2020  Bacterial vaginosis 07/09/2020    Urinary tract infection with hematuria 02/03/2020    Epigastric pain 07/02/2019    Thoracic aortic aneurysm without rupture (HCC) 08/12/2018    Hypokalemia 08/11/2018    Abnormal urinalysis 08/11/2018    Vaginal discharge 04/12/2018    Yeast vaginitis 04/12/2018    Recurrent vaginitis 04/12/2018    Cardiac murmur 12/15/2017    Primary insomnia 12/15/2017    Anxiety 07/26/2017    Depression, recurrent ([facility] ) 07/26/2017    Retinal hemorrhage due to secondary diabetes ([facility] ) 07/26/2017    Fibromyalgia 07/26/2017    Hypertension 07/26/2017    Hypoestrogenism 07/26/2017    Neuropathy 07/26/2017    Chronic pain disorder 06/05/2017    Medically complex patient 06/05/2017    Change in bowel habit 04/24/2017    Screening for colon cancer 12/05/2016    Cough 02/15/2016    Non compliance with medical treatment 01/27/2016    Dizziness 01/26/2016    Gastroesophageal reflux disease with esophagitis 01/26/2016    Vertigo 01/26/2016    Vertebral body hemangioma 08/21/2015    Hemangioma of bone 07/30/2015    Right-sided thoracic back pain 07/23/2015    Thoracic radiculitis 07/23/2015    Carpal tunnel syndrome of left wrist 06/26/2015    Tobacco abuse 06/26/2015    Type 2 diabetes mellitus with hyperglycemia ([facility] ) 06/09/2015    Hyperlipidemia associated with type 2 diabetes mellitus ([facility] ) 05/06/2015    Noncompliance with diet and medication regimen 05/06/2015    Shingles 03/25/2015    Diabetic peripheral neuropathy ([facility] ) 02/25/2015    Low back pain 02/25/2015    Lumbar radiculopathy 02/25/2015    Overweight 02/25/2015    Sciatica of left side 02/25/2015      LOS (days): 3  Geometric Mean LOS (GMLOS) (days): 5 10  Days to GMLOS:2 1     OBJECTIVE:  PATIENT READMITTED TO HOSPITAL  Risk of Unplanned Readmission Score: 61 53         Current admission status: Inpatient       Preferred Pharmacy:   [id number] [address] [facility] [address]  Phone: [phone] Fax: [phone] [place] [address] [address] [facility]  [address] [facility] [address]  Phone: [phone] Fax: [phone]    [facility] [address], [place] [address] [facility] [address]  Phone: [phone] Fax: [address], [address] [place] [address] [place] [address] [facility]  Phone: [phone] Fax: [phone]    Primary Care Provider: Beth Brian MD    Primary Insurance: [place] [address] [facility] RE[facility]  Secondary Insurance: [address] [facility]    ASSESSMENT:  [address] [facility], [address] Representative - Son   Primary Phone: [phone] ([place])               Advance Directives  Does patient have a [address]?: Yes  Does patient have Advance Directives?: No  Was patient offered paperwork?: Yes (Declined at this time)  Primary Contact: Duane-son    Readmission Root Cause  30 Day Readmission: No    Patient Information  Admitted from[de-identified] Home  Mental Status: Alert  During Assessment patient was accompanied by: Not accompanied during assessment  Assessment information provided by[de-identified] Patient, Son  Primary Caregiver: Child  Caregiver's Name[de-identified] Duane-patient's son  Rodriguez Parents Relationship to Patient[de-identified] Family Member  Support Systems: Son, Family members, [address] of Residence: [address] [facility] do you live in?: [place] entry access options   Select all that apply : Stairs  Number of steps to enter home : 3  Do the steps have railings?: Yes  Type of Current Residence: 3 story home  Upon entering residence, is there a bedroom on the main floor (no further steps)?: No  A bedroom is located on the following floor levels of residence (select all that apply):: 2nd Floor  Upon entering residence, is there a bathroom on the main floor (no further steps)?: Yes  Number of steps to 2nd floor from main floor: One Flight  In the last 12 months, was there a time when you were not able to pay the mortgage or rent on time?: No  In the last 12 months, was there a time when you did not have a steady place to sleep or slept in a shelter (including now)?: No  Homeless/housing insecurity resource given?: No  Living Arrangements: Lives w/ Son, Lives w/ Extended Family  Is patient a ?: No    Activities of Daily Living Prior to Admission  Functional Status: Assistance  Completes ADLs independently?: No  Level of ADL dependence: Assistance ([facility])  Ambulates independently?: Yes  Does patient use assisted devices?: Yes  Assisted Devices (DME) used: Celia Bearded, Other (Comment) (trach supplies)  Does patient currently own DME?: Yes  What DME does the patient currently own?: Celia Bearded, Wheelchair, Shower Chair, Other (Comment) (trach supplies)  Does patient have a history of Outpatient Therapy (PT/OT)?: No  Does the patient have a history of Short-Term Rehab?: Yes (Unable to remember STR name)  Does patient have a history of HHC?: Yes ([facility])  Does patient currently have [facility]?: No    Patient Information Continued  Income Source: Unemployed  Does patient have prescription coverage?: Yes  Food insecurity resource given?: No  Does patient receive dialysis treatments?: No  Does patient have a history of substance abuse?: No  Does patient have a history of Mental Health Diagnosis?: Yes (Depression and Anxiety)    PHQ 2/9 Screening   Reviewed PHQ 2/9 Depression Screening Score?: No    Means of Transportation  Means of Transport to Appts[de-identified] Family transport  In the past 12 months, has lack of transportation kept you from medical appointments or from getting medications?: No  In the past 12 months, has lack of transportation kept you from meetings, work, or from getting things needed for daily living?: No  Was application for public transport provided?: No    DISCHARGE DETAILS:    Discharge planning discussed with[de-identified] patient and son Junior Antonio of Choice: Yes     CM contacted family/caregiver?: Yes  Were Treatment Team discharge recommendations reviewed with patient/caregiver?: Yes  Did patient/caregiver verbalize understanding of patient care needs?: Yes  Were patient/caregiver advised of the risks associated with not following Treatment Team discharge recommendations?: Yes    Contacts  Patient Contacts: [place]  Relationship to Patient[de-identified] Family  Contact Method: Phone  Reason/Outcome: Continuity of Care, Emergency Contact, Discharge [address]         Is the patient interested in [facility] at discharge?: Yes  [address] requested[de-identified] Nursing, Physical [address] Name[de-identified] Other  [address] [facility] Provider[de-identified] PCP  Home Health Services Needed[de-identified] Evaluate Functional Status and Safety, Gait/ADL Training, Strengthening/Theraputic Exercises to Improve Function, Diabetes Management, Other (comment) (trach)  Homebound Criteria Met[de-identified] Requires the Assistance of Another Person for Safe Ambulation or to Leave the Home, Uses an Assist Device (i e  cane, walker, etc)  Supporting Clincal Findings[de-identified] Limited Endurance    DME Referral Provided  Referral made for DME?: Yes  DME referral completed for the following items[de-identified] Other (suctioning kits)  DME Supplier Name[de-identified] [facility]    Other Referral/Resources/Interventions Provided:  Interventions: HHC, DME  Referral Comments: [place] referrals sent for [facility] for Home SN/PT, [facility] representative for delivery of suction kits to the home    Treatment Team Recommendation: Home  Discharge Destination Plan[de-identified] Home with [place] at Discharge : Family     CM spoke with patient at the bedside  CM introduced self and role  Patient lives with her son James and other family members in a 3 level home  Patient's son provides help with trach care  Patient able to ambulate at home with RW   Patient ambulating the steps at home  No fall history  Patient currently does not have any home services  Patient requesting Home SN/PT at discharge  Patient requesting CM contact son Drew Lam with updates  CM spoke with patient's son Drew Lam at   CM introduced self and role  Patient's son updated CM did speak with patient at the bedside  Son stated trach supplies are provided by [facility]  Son stated they have all the trach supplies they need except for extra suctioning kits  Son updated CM will reach out to [facility] liaison to deliver extra kits to the home  Son requesting Home SN/PT at discharge  Son open to blanket referral for VNA services  CM will f/u with options  All questions/concerns answered at this time  CM contacted [address] provided patient's name/ and need for suctioning kits at home  Liaison will reach out and provide CM update on order  Son's name provided to liaison also  CM sent blanket referral for VNA for Home SN/PT  Waiting for options  CM reviewed discharge planning process including the following: identifying caregivers at home, preference for d/c planning needs,   availability of [facility] to Bed program, availability of treatment team to discuss questions or concerns patient and/or family may have regarding diagnosis, plan of care, old or new medications and discharge planning   CM will continue to follow for care coordination and update assessment as appropriate

## 2022-07-22 NOTE — CONSULTS
Consultation - Interventional Radiology  Lara Calvo 65 y o  female MRN: [id number]  Unit/Bed#: S -01 Encounter: [id number]        Consults    ASSESSMENT/PLAN:    Patient with 8 [place] right anterior chest tube placed by interventional radiology for a spontaneous pneumothorax  She has complex medical history with prior cardiac arrest and chronic tracheostomy    The lung never appear to completely inflate on subsequent radiographs after tube placement  There is no air leak  We will plan for upsize reposition exchange today  Verbal consent obtained from the patient    Medical Problems             Problem List     * (Principal) Primary spontaneous pneumothorax    Anxiety    Cardiac murmur    Carpal tunnel syndrome of left wrist    Change in bowel habit    Overview Signed 4/12/2018 10:32 AM by NEELAM Ahmadi     Overview:   Added automatically from request for surgery [id number]         Chronic pain disorder    Cough    Depression, recurrent (HCC)    Retinal hemorrhage due to secondary diabetes [facility])    Lab Results   Component Value Date    HGBA1C 12 7 (H) 05/22/2022       Recent Labs     07/21/22  1136 07/21/22  1603 07/21/22  2046 07/22/22  0640   POCGLU 232* 178* 225* 258*       Blood Sugar Average: Last 72 hrs:  (P) 347 [id number]        Diabetic peripheral neuropathy (HCC)    Overview Signed 4/12/2018 10:32 AM by NEELAM Ahmadi     Last Assessment & Plan:   Apparently well-controlled  Continue to monitor            Lab Results   Component Value Date    HGBA1C 12 7 (H) 05/22/2022       Recent Labs     07/21/22  1136 07/21/22  1603 07/21/22  2046 07/22/22  0640   POCGLU 232* 178* 225* 258*       Blood Sugar Average: Last 72 hrs:  (P) 092 [id number]        Dizziness    Fibromyalgia    Gastroesophageal reflux disease with esophagitis    Hemangioma of bone    Hyperlipidemia associated with type 2 diabetes mellitus (HCC) (Chronic)    Overview Signed 4/12/2018 10:32 AM by NEELAM Evans     Last Assessment & Plan:   Last LDL above goal <100, not taking statin  After discussion of medication risks/benefits, Pt agrees to trial of low-potency statin  Instructed cessation for new cramps/aches  Lab Results   Component Value Date    HGBA1C 12 7 (H) 05/22/2022       Recent Labs     07/21/22  1136 07/21/22  1603 07/21/22  2046 07/22/22  0640   POCGLU 232* 178* 225* 258*       Blood Sugar Average: Last 72 hrs:  (P) 661 [id number]        Hypertension (Chronic)    Hypoestrogenism    Low back pain    Lumbar radiculopathy    Medically complex patient    Neuropathy    Non compliance with medical treatment    Noncompliance with diet and medication regimen    Overweight    Primary insomnia    Right-sided thoracic back pain    Sciatica of left side    Screening for colon cancer    Shingles    Thoracic radiculitis    Tobacco abuse    Type 2 diabetes mellitus with hyperglycemia (HCC)    Overview Signed 4/12/2018 10:32 AM by NEELAM Love     Overview:   Diagnosed: @ 45yo, never education, c/b PN, PDR, microalbuminuria    Last Assessment & Plan:   Last A1c above goal <7, improved from prior but still far above goal, with new development of an episode of hypoglycemia  After discussion of risks/benefits/alternatives, Pt remains reluctant to make changes  Instructed Pt to check with insurance for coverage of options that would be less likely to cause hypoglycemia  Repeat A1c w/ f/u           Lab Results   Component Value Date    HGBA1C 12 7 (H) 05/22/2022       Recent Labs     07/21/22  1136 07/21/22  1603 07/21/22 2046 07/22/22  0640   POCGLU 232* 178* 225* 258*       Blood Sugar Average: Last 72 hrs:  (P) 654 [id number]        Vertebral body hemangioma    Vertigo    Vaginal discharge    Yeast vaginitis    Recurrent vaginitis    Hypokalemia    Abnormal urinalysis    Thoracic aortic aneurysm without rupture (HCC)    Epigastric pain    Urinary tract infection with hematuria    Bacterial vaginosis    Abscess    Palpitations    Nasal vestibulitis    Orthopnea    CHANTALE (obstructive sleep apnea)    Diarrhea    Trigger finger, right middle finger    Trigger finger, right ring finger    Hypoglycemia    Polypharmacy    Cellulitis of left lower extremity    MODESTO (acute kidney injury) ([facility] [address] )    History of Ventricular tachycardia (HCC)    A-fib (HCC)    Acute on chronic respiratory failure with hypoxia ([facility] [address] )    History of Cardiac arrest ([facility] )    Cerebral vascular accident [facility])    Cerebral aneurysm    Urinary retention    CAD (coronary artery disease)    Subglottic stenosis    Tracheostomy in place [facility])    Chronic hypoxemic respiratory failure ([facility])    Insomnia secondary to anxiety    Acute on chronic HFrEF (heart failure with reduced ejection fraction) ([facility] )    Wt Readings from Last 3 Encounters:   07/22/22 77 6 kg (171 lb)   07/19/22 78 2 kg (172 lb 6 4 oz)   06/29/22 81 7 kg (180 lb 3 2 oz)                 Elevated troponin    Microcytic anemia    COVID-19    Shortness of breath    Prolonged Q-T interval on ECG    Thoracic aortic aneurysm, ruptured ([facility])    Hypomagnesemia    Chest pain    Moderate protein-calorie malnutrition ([facility] [address] )    Malnutrition Findings:                                 BMI Findings: Body mass index is 25 25 kg/m²           Hemoptysis    Hyponatremia    Hyperkalemia    Acute kidney injury superimposed on chronic kidney disease stage 3 (HCC)    Lab Results   Component Value Date    EGFR 48 07/22/2022    EGFR 47 07/21/2022    EGFR 43 07/20/2022    CREATININE 1 24 07/22/2022    CREATININE 1 26 07/21/2022    CREATININE 1 37 (H) 07/20/2022                     Reason for Consult / Principal Problem:    HPI: Cynthia Hermosillo is a 65y o  year old female who presents with Primary spontaneous pneumothorax      Review of Systems      Past Medical History:  Past Medical History:   Diagnosis Date    Anxiety     Aortic aneurysm ([facility] [address] )     Arthritis     Depression     Diabetes mellitus ([facility] [address] )     Fibromyalgia     GERD (gastroesophageal reflux disease)     GERD (gastroesophageal reflux disease)     H/O cardiovascular stress test 2018    no ischemia  EF 70%   H/O echocardiogram 2019    EF 60%  Mild LVH  trivial effusion   Hyperlipidemia     Hypertension     Migraines     Psychiatric disorder     anxiety    Uncontrolled hypertension 2015    Last Assessment & Plan:  BP today above goal <140/90, apparently asymptomatic  Prior BP elevations were attributed to not taking medication  Consider increased medication if persistent on f/u  Past Surgical History:  Past Surgical History:   Procedure Laterality Date    BACK SURGERY      Lumbar epidural steroid injection    CARDIAC CATHETERIZATION N/A 10/22/2021    Procedure: Cardiac pci;  Surgeon: Bjorn Reece MD;  [facility]; Service: Cardiology    CARDIAC CATHETERIZATION  10/22/2021    Procedure: Cardiac catheterization;  Surgeon: Bjorn Reece MD;  [facility]; Service: Cardiology    CARDIAC CATHETERIZATION Left 10/27/2021    Procedure: Cardiac Left Heart Cath;  Surgeon: Elana Oquendo MD;  [facility]; Service: Cardiology    CARDIAC CATHETERIZATION N/A 10/27/2021    Procedure: Cardiac pci;  Surgeon: Elana Oquendo MD;  [facility]; Service: Cardiology    CARDIAC CATHETERIZATION N/A 10/28/2021    Procedure: Cardiac pci;  Surgeon: Che Brown DO;  [facility];   Service: Cardiology    CARPAL TUNNEL RELEASE Left      SECTION      CHOLECYSTECTOMY      COLONOSCOPY      incomplete    COLONOSCOPY      EYE SURGERY      HYSTERECTOMY      Total    IR CHEST TUBE PLACEMENT  2022    OVARIAN CYST REMOVAL      PEG W/TRACHEOSTOMY PLACEMENT N/A 2021    Procedure: TRACHEOSTOMY WITH INSERTION PEG TUBE;  Surgeon: Imani Fung DO;  Location: [facility] MAIN OR;  Service: General    TUBAL LIGATION      UPPER GASTROINTESTINAL ENDOSCOPY         Social History:  Social History     Substance and Sexual Activity   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  Social History     Substance and Sexual Activity   Drug Use Yes    Types: Marijuana    Comment: medical; seldom use     Social History     Tobacco Use   Smoking Status Former Smoker    Packs/day: 1 00    Years: 30 00    Pack years: 30 00    Types: Cigarettes   Smokeless Tobacco Never Used       Family History:  Family History   Problem Relation Age of Onset    Hypertension Mother     Arthritis Mother     Diabetes Father     Other Sister         renal cell carcinoma    Diabetes Other     Factor V Leiden deficiency Other        Allergies:   Allergies   Allergen Reactions    Metformin Diarrhea    Clonidine Rash     Patch        Medications:  Medications Prior to Admission   Medication    escitalopram (LEXAPRO) 10 mg tablet    acetaminophen (TYLENOL) 160 mg/5 mL suspension    albuterol (2 5 mg/3 mL) 0 083 % nebulizer solution    amiodarone 100 mg tablet    apixaban (ELIQUIS) 5 mg    atorvastatin (LIPITOR) 40 mg tablet    Benzocaine-Menthol 15-2 6 MG LOZG    Blood Pressure Monitoring (Sphygmomanometer) MISC    Brilinta 90 MG    bumetanide (BUMEX) 2 mg tablet    chlorhexidine (PERIDEX) 0 12 % solution    famotidine (PEPCID) 20 mg/2 5 mL oral suspension    insulin aspart (NovoLOG FlexPen) 100 UNIT/ML injection pen    insulin glargine (Basaglar KwikPen) 100 units/mL injection pen    Insulin Pen Needle 31G X 6 MM MISC    ipratropium (ATROVENT) 0 02 % nebulizer solution    Lancets MISC    levalbuterol (XOPENEX) 1 25 mg/0 5 mL nebulizer solution    LORazepam (Ativan) 1 mg tablet    losartan (COZAAR) 50 mg tablet    magnesium Oxide (MAG-OX) 400 mg TABS    melatonin 3 mg    metoprolol succinate (TOPROL-XL) 50 mg 24 hr tablet    Novofine Pen Needle 32G X 6 MM MISC    pantoprazole (PROTONIX) 40 mg tablet    polyethylene glycol (MIRALAX) 17 g packet    potassium chloride 40 MEQ/15ML (20%) oral solution    pregabalin (LYRICA) 75 mg capsule    senna-docusate sodium (SENOKOT S) 8 6-50 mg per tablet    spironolactone (ALDACTONE) 100 mg tablet    traZODone (DESYREL) 50 mg tablet     Current Facility-Administered Medications   Medication Dose Route Frequency    acetaminophen (TYLENOL) tablet 975 mg  975 mg Oral Q6H [address]    albuterol inhalation solution 2 5 mg  2 5 mg Nebulization Q4H PRN    amiodarone tablet 100 mg  100 mg Oral Daily With Breakfast    apixaban (ELIQUIS) tablet 5 mg  5 mg Oral BID    atorvastatin (LIPITOR) tablet 40 mg  40 mg Oral Daily With Dinner    benzonatate (TESSALON PERLES) capsule 100 mg  100 mg Oral TID PRN    bumetanide (BUMEX) tablet 1 mg  1 mg Oral BID    escitalopram (LEXAPRO) tablet 10 mg  10 mg Oral Daily    fentanyl citrate (PF) 100 MCG/2ML   Intravenous Code/Trauma/Sedation Med    ferrous sulfate tablet 325 mg  325 mg Oral Daily With Breakfast    guaiFENesin (MUCINEX) 12 hr tablet 1,200 mg  1,200 mg Oral Q12H CEFERINO    insulin glargine (LANTUS) subcutaneous injection 30 Units 0 3 mL  30 Units Subcutaneous HS    insulin lispro (HumaLOG) 100 units/mL subcutaneous injection 12 Units  12 Units Subcutaneous TID With Meals    insulin lispro (HumaLOG) 100 units/mL subcutaneous injection 2-12 Units  2-12 Units Subcutaneous TID AC    insulin lispro (HumaLOG) 100 units/mL subcutaneous injection 2-12 Units  2-12 Units Subcutaneous HS    ipratropium (ATROVENT) 0 02 % inhalation solution 0 5 mg  0 5 mg Nebulization TID    levalbuterol (XOPENEX) inhalation solution 1 25 mg  1 25 mg Nebulization TID    LORazepam (ATIVAN) tablet 0 5 mg  0 5 mg Oral Q8H PRN    metoprolol succinate (TOPROL-XL) 24 hr tablet 50 mg  50 mg Oral Q12H    oxyCODONE (ROXICODONE) IR tablet 2 5 mg  2 5 mg Oral Q4H PRN    pantoprazole (PROTONIX) EC tablet 40 mg  40 mg Oral Early Morning    pregabalin (LYRICA) capsule 75 mg  75 mg Oral Daily    ticagrelor (BRILINTA) tablet 90 mg  90 mg Oral Q12H       Vitals:  /70   Pulse 57   Temp 98 3 °F (36 8 °C)   Resp 18   Wt 77 6 kg (171 lb)   SpO2 98%   BMI 25 25 kg/m²   Body mass index is 25 25 kg/m²    Weight (last 2 days)     Date/Time Weight    07/22/22 0600 77 6 (171)    07/21/22 0544 77 1 (170)    07/20/22 0600 78 (172)          I/Os:  No intake or output data in the 24 hours ending 07/22/22 0923    PHYSICAL EXAM    Trach  r anterior chest tube  Life vest  Tube without leakage      Lab Results and Cultures:   CBC with diff:   Lab Results   Component Value Date    WBC 12 03 (H) 07/20/2022    HGB 9 9 (L) 07/20/2022    HCT 32 9 (L) 07/20/2022    MCV 78 (L) 07/20/2022     (H) 07/20/2022    MCH 23 4 (L) 07/20/2022    MCHC 30 1 (L) 07/20/2022    RDW 16 6 (H) 07/20/2022    MPV 9 6 07/20/2022    NRBC 0 07/20/2022      BMP/CMP:  Lab Results   Component Value Date     07/19/2015    K 4 1 07/22/2022    K 3 7 07/19/2015    CL 98 07/22/2022     07/19/2015    CO2 27 07/22/2022    CO2 27 10/28/2021    ANIONGAP 8 07/19/2015    BUN 45 (H) 07/22/2022    BUN 16 07/19/2015    CREATININE 1 24 07/22/2022    CREATININE 0 71 07/19/2015    GLUCOSE 185 (H) 10/28/2021    GLUCOSE 230 (H) 07/19/2015    CALCIUM 8 8 07/22/2022    CALCIUM 8 5 07/19/2015    AST 4 (L) 07/15/2022    AST 16 07/19/2015    ALT 3 (L) 07/15/2022    ALT 30 07/19/2015    ALKPHOS 95 07/15/2022    ALKPHOS 103 07/19/2015    PROT 7 7 07/19/2015    BILITOT 0 43 07/19/2015    EGFR 48 07/22/2022   ,     Coags:   Lab Results   Component Value Date    PTT 25 02/23/2022    INR 1 32 (H) 02/23/2022   ,          Lipid Panel: No results found for: CHOL  Lab Results   Component Value Date    HDL 55 10/23/2021     Lab Results   Component Value Date    HDL 55 10/23/2021     Lab Results   Component Value Date    LDLCALC 36 10/23/2021     Lab Results   Component Value Date    TRIG 75 10/23/2021       HgbA1c:   Lab Results Component Value Date    HGBA1C 12 7 (H) 05/22/2022    HGBA1C 6 6 (H) 01/01/2022    HGBA1C 8 2 (H) 11/05/2021       Blood Culture:   Lab Results   Component Value Date    BLOODCX No Growth After 5 Days  05/11/2022   ,   Urinalysis:   Lab Results   Component Value Date    COLORU Yellow 10/29/2021    COLORU Yellow 07/19/2015    CLARITYU Cloudy 10/29/2021    CLARITYU Clear 07/19/2015    SPECGRAV 1 012 10/29/2021    SPECGRAV 1 015 07/19/2015    PHUR 5 0 10/29/2021    PHUR 6 0 03/04/2019    PHUR 7 0 07/19/2015    LEUKOCYTESUR Moderate (A) 10/29/2021    LEUKOCYTESUR Negative 07/19/2015    NITRITE Negative 10/29/2021    NITRITE Negative 07/19/2015    PROTEINUA Negative 07/19/2015    GLUCOSEU Negative 10/29/2021    GLUCOSEU 100 (1/10%) (A) 07/19/2015    KETONESU Negative 10/29/2021    KETONESU Negative 07/19/2015    BILIRUBINUR Negative 10/29/2021    BILIRUBINUR Negative 07/19/2015    BLOODU Negative 10/29/2021    BLOODU Negative 07/19/2015   ,   Urine Culture:   Lab Results   Component Value Date    URINECX >100,000 cfu/ml Escherichia coli (A) 10/29/2021   ,   Wound Culure:  No results found for: WOUNDCULT    Imaging Studies: I have personally reviewed pertinent films in PACS    Counseling / Coordination of Care  Total time spent today  30 minutes  Greater than 50% of total time was spent with the patient and / or family counseling and / or coordination of care  Thank you for allowing me to participate in the care of [address]  Please don't hesitate to contact us with any questions

## 2022-07-22 NOTE — ASSESSMENT & PLAN NOTE
Lab Results   Component Value Date    EGFR 48 07/22/2022    EGFR 47 07/21/2022    EGFR 43 07/20/2022    CREATININE 1 24 07/22/2022    CREATININE 1 26 07/21/2022    CREATININE 1 37 (H) 07/20/2022   · Creatinine elevated at 1 64 upon transfer on 7/19/22, with baseline being 0 9-1   Improved today to 1 24

## 2022-07-22 NOTE — BRIEF OP NOTE (RAD/CATH)
INTERVENTIONAL RADIOLOGY PROCEDURE NOTE    Date: 7/22/2022    Procedure: IR CHEST TUBE REPLACEMENT    Preoperative diagnosis:   1  Primary spontaneous pneumothorax    2  Pneumothorax         Postoperative diagnosis: Same  Surgeon: Alvino Keating MD     Assistant: None  No qualified resident was available  Blood loss: 0    Specimens: 0     Findings:     Chest tube upsize and exchanged for a 14 [place] citizen tube    Unable to position at the apex    Tube is now working with intermittent expect wil air-leak    Recommend aggressive pain management    Complications: None immediate      Anesthesia: local and local and IV Fentanyl

## 2022-07-22 NOTE — ASSESSMENT & PLAN NOTE
· Patient noted on imaging to have small pneumothorax at [facility], subsequently transferred here to have placement of chest tube  · Consult from pulmonology appreciated  · IR consulted --placed chest tube on June 20th   Requires up-size of tube today since repeat CXR today showed persistent/unchanged moderate PTX  · Pain control regimen

## 2022-07-22 NOTE — ASSESSMENT & PLAN NOTE
· Patient reportedly presented to the hospital initially with shortness of breath and chest pain  She carries multiple significant underlying diagnoses including COPD, congestive heart failure, CHANTALE; She recently had COVID 2 months ago and has a tracheostomy  She was found to have new small PTX at [facility]   No longer reporting shortness of breath  · Avoid BiPAP for now given PTX  · Very minimal procal elevation and mild leukocytosis- observing off antibiotics  · Pulmonology consult appreciated, case d/w them

## 2022-07-22 NOTE — ASSESSMENT & PLAN NOTE
· Continue holding supplemental potassium, spironolactone, and ARB    Dose of Kayexalate was provided   · BMP now reflects normal potassium 4 1  · continue on low potassium diet

## 2022-07-22 NOTE — ASSESSMENT & PLAN NOTE
Lab Results   Component Value Date    HGBA1C 12 7 (H) 05/22/2022     Recent Labs     07/21/22  1136 07/21/22  1603 07/21/22 2046 07/22/22  0640   POCGLU 232* 178* 225* 258*     Blood Sugar Average: Last 72 hrs:  · (P) 546 [id number] poorly controlled based on most recent A1c and hyperglycemia noted here  · Monitor on Accu-Cheks with basal bolus regimen-- titrate up as needed  · ADA diet

## 2022-07-22 NOTE — ASSESSMENT & PLAN NOTE
· History of MI oct 2021  · On beta-blocker, Brilinta  · Apparently complained of chest pain on admission to [place] but troponins were negative and EKG was nonischemic

## 2022-07-22 NOTE — PROGRESS NOTES
[facility]  Progress Note - Benjamín Miu 1965, 65 y o  female MRN: [id number]  Unit/Bed#: S -01 Encounter: [id number]  Primary Care Provider: Oumou Muhammad MD   Date and time admitted to hospital: 7/19/2022 11:33 AM    * Primary spontaneous pneumothorax  Assessment & Plan  · Patient noted on imaging to have small pneumothorax at [facility], subsequently transferred here to have placement of chest tube  · Consult from pulmonology appreciated  · IR consulted --placed chest tube on June 20th  Requires up-size of tube today since repeat CXR today showed persistent/unchanged moderate PTX  · Pain control regimen      Acute kidney injury superimposed on chronic kidney disease stage 3 [facility])  Assessment & Plan  Lab Results   Component Value Date    EGFR 48 07/22/2022    EGFR 47 07/21/2022    EGFR 43 07/20/2022    CREATININE 1 24 07/22/2022    CREATININE 1 26 07/21/2022    CREATININE 1 37 (H) 07/20/2022   · Creatinine elevated at 1 64 upon transfer on 7/19/22, with baseline being 0 9-1  Improved today to 1 24    Hyperkalemia  Assessment & Plan  · Continue holding supplemental potassium, spironolactone, and ARB  Dose of Kayexalate was provided   · BMP now reflects normal potassium 4 1  · continue on low potassium diet     Shortness of breath  Assessment & Plan  · Patient reportedly presented to the hospital initially with shortness of breath and chest pain  She carries multiple significant underlying diagnoses including COPD, congestive heart failure, CHANTALE; She recently had COVID 2 months ago and has a tracheostomy  She was found to have new small PTX at [facility]   No longer reporting shortness of breath  · Avoid BiPAP for now given PTX  · Very minimal procal elevation and mild leukocytosis- observing off antibiotics  · Pulmonology consult appreciated, case d/w them    Acute on chronic HFrEF (heart failure with reduced ejection fraction) ([facility] )  Assessment & Plan  Wt Readings from Last 3 Encounters:   07/22/22 77 6 kg (171 lb)   07/19/22 78 2 kg (172 lb 6 4 oz)   06/29/22 81 7 kg (180 lb 3 2 oz)   · EF noted to be 50% on last echocardiogram earlier this year  · Received IV lasix upon arrival there for shortness of breath  · Appeared euvolemic upon arrival here  Diuretics were being held due to acute kidney injury  Given that creatinine is now close to baseline, we resumed her Bumex on 7/21/22 at half dose 1 mg p o  B i d  for now to prevent heart failure exacerbation; titrate up to her prior 2 mg BID if needed        Tracheostomy in place [facility])  Assessment & Plan  · Trach placed in the context of cardiac arrest and prolonged hospitalization November 2021  Apparently patient comes to the hospital frequently for suctioning  Had some bleeding from the trach at [facility], now resolved  Also had trach replaced there  · Per d/w speech therapy,  video barium swallow was done for sense of food getting stuck   Appropriate for regular diet  · On trach collar O2 with humidification    Type 2 diabetes mellitus with hyperglycemia [facility])  Assessment & Plan  Lab Results   Component Value Date    HGBA1C 12 7 (H) 05/22/2022     Recent Labs     07/21/22  1136 07/21/22  1603 07/21/22  2046 07/22/22  0640   POCGLU 232* 178* 225* 258*     Blood Sugar Average: Last 72 hrs:  · (P) 110 [id number] poorly controlled based on most recent A1c and hyperglycemia noted here  · Monitor on Accu-Cheks with basal bolus regimen-- titrate up as needed  · ADA diet    A-fib (HCC)  Assessment & Plan  · On anticoagulation with Eliquis, cautiously given bleeding at chest tube site  · On amiodarone and Toprol    CAD (coronary artery disease)  Assessment & Plan  · History of MI oct 2021  · On beta-blocker, Brilinta  · Apparently complained of chest pain on admission to [place] but troponins were negative and EKG was nonischemic    History of Cardiac arrest [facility])  Assessment & Plan  · Patient apparently with history of VT cardiac arrest 2021  Now with life vest--must be on telemetry if LifeVest off  Unclear why she has not yet followed up with EP to have ICD  Defer to outpatient cardiology    VTE Pharmacologic Prophylaxis: VTE Score: 3 eliquis    Patient Centered Rounds:  Discussions with Specialists or Other Care Team Provider: pulmonology    Education and Discussions with Family / Patient: Updated  (son) via phone  Time Spent for Care: 20 minutes  More than 50% of total time spent on counseling and coordination of care as described above  Current Length of Stay: 3 day(s)  Current Patient Status: Inpatient   Certification Statement: The patient will continue to require additional inpatient hospital stay due to chest tube  Discharge Plan: dc home when cleared by pulm, date unknown    Code Status: Level 1 - Full Code    Subjective:   Patient reports she is still having pain    Objective:     Vitals:   Temp (24hrs), Av 9 °F (36 6 °C), Min:97 6 °F (36 4 °C), Max:98 3 °F (36 8 °C)    Temp:  [97 6 °F (36 4 °C)-98 3 °F (36 8 °C)] 98 3 °F (36 8 °C)  HR:  [57-62] 62  Resp:  [14-18] 18  BP: (117-145)/(70-83) 120/70  SpO2:  [98 %-100 %] 98 %  Body mass index is 25 25 kg/m²  Input and Output Summary (last 24 hours):   No intake or output data in the 24 hours ending 22 1000    Physical Exam:   Physical Exam  Vitals reviewed  Constitutional:       General: She is not in acute distress  Appearance: She is not ill-appearing, toxic-appearing or diaphoretic  Eyes:      General: No scleral icterus  Right eye: No discharge  Left eye: No discharge  Conjunctiva/sclera: Conjunctivae normal    Cardiovascular:      Rate and Rhythm: Normal rate and regular rhythm  Pulmonary:      Effort: No respiratory distress  Breath sounds: No stridor  No wheezing or rhonchi  Comments: Trach collar in place   No wheezes, tachypnea, distress  Abdominal:      General: There is no distension  Palpations: Abdomen is soft  Tenderness: There is no abdominal tenderness  There is no guarding  Musculoskeletal:      Right lower leg: No edema  Left lower leg: No edema  Skin:     General: Skin is warm and dry  Coloration: Skin is not jaundiced or pale  Findings: No bruising, erythema, lesion or rash  Neurological:      General: No focal deficit present  Mental Status: She is alert  Mental status is at baseline  Psychiatric:         Mood and Affect: Mood normal           Additional Data:     Labs:  Results from last 7 days   Lab Units 07/20/22  0430   WBC Thousand/uL 12 03*   HEMOGLOBIN g/dL 9 9*   HEMATOCRIT % 32 9*   PLATELETS Thousands/uL 542*   NEUTROS PCT % 63   LYMPHS PCT % 14   MONOS PCT % 9   EOS PCT % 12*     Results from last 7 days   Lab Units 07/22/22  0451 07/19/22  0510 07/18/22  0513   SODIUM mmol/L 135   < > 136   POTASSIUM mmol/L 4 1   < > 4 3   CHLORIDE mmol/L 98   < > 93*   CO2 mmol/L 27   < > 32   BUN mg/dL 45*   < > 32*   CREATININE mg/dL 1 24   < > 1 30   ANION GAP mmol/L 10   < > 11   CALCIUM mg/dL 8 8   < > 9 6   ALBUMIN g/dL  --   --  3 2*   GLUCOSE RANDOM mg/dL 212*   < > 194*    < > = values in this interval not displayed           Results from last 7 days   Lab Units 07/22/22  0640 07/21/22  2046 07/21/22  1603 07/21/22  1136 07/21/22  0741 07/20/22  2100 07/20/22  1614 07/20/22  1109 07/20/22  0742 07/19/22  2104 07/19/22  1636 07/19/22  0747   POC GLUCOSE mg/dl 258* 225* 178* 232* 278* 167* 213* 260* 345* 360* 297* 309*         Results from last 7 days   Lab Units 07/19/22  1359 07/19/22  0510 07/18/22  0843   PROCALCITONIN ng/ml 0 22 0 27* 0 24       Lines/Drains:  Invasive Devices  Report    Peripheral Intravenous Line  Duration           Peripheral IV 07/20/22 Right Antecubital 2 days          Drain  Duration           Chest Tube 1 Right Other (Comment) 14 Fr  <1 day          Airway  Duration           Surgical Airway Mariana Fenestrated 142 days                  Telemetry:  Telemetry Orders (From admission, onward)             LifeVest Patient: Continuous Telemetry Monitoring during hospitalization (non-expiring)  Continuous LifeVest Telemetry Monitoring        References:    [facility] Patient: Continuous Telemetry Monitoring during hospitalization (non-expiring)  Continuous LifeVest Telemetry Monitoring        References:    LifeVest Policy                 Telemetry Reviewed: NSR  Indication for Continued Telemetry Use:life vest             Imaging: Reviewed radiology reports from this admission including: chest xray    Recent Cultures (last 7 days):         Last 24 Hours Medication List:   Current Facility-Administered Medications   Medication Dose Route Frequency Provider Last Rate    acetaminophen  975 mg Oral Q6H [facility] Leda Chand PA-C      albuterol  2 5 mg Nebulization Q4H PRN Leda Chand PA-C      amiodarone  100 mg Oral Daily With Breakfast Leda Chand PA-C      apixaban  5 mg Oral BID Leda Chand, DONALD      atorvastatin  40 mg Oral Daily With [employer], DONALD      benzonatate  100 mg Oral TID PRN Edel Wiseman MD      bumetanide  1 mg Oral BID Leda Chand PA-C      escitalopram  10 mg Oral Daily Leda Chand PA-C      fentanyl citrate (PF)   Intravenous Code/Trauma/Sedation Med Caren Guillen MD      ferrous sulfate  325 mg Oral Daily With Breakfast Leda Chand PA-C      guaiFENesin  1,200 mg Oral Q12H [facility] Leda Chand PA-C      insulin glargine  33 Units Subcutaneous HS Leda Chand PA-C      insulin lispro  14 Units Subcutaneous TID With Meals Leda Chand PA-C      insulin lispro  2-12 Units Subcutaneous TID AC Leda Chand PA-C      insulin lispro  2-12 Units Subcutaneous HS Leda Chand PA-C      ipratropium  0 5 mg Nebulization TID Leda Chand PA-C      levalbuterol  1 25 mg Nebulization TID Leda Chand PA-C      LORazepam  0 5 mg Oral Q8H PRN Leda Chand, DONALD      metoprolol succinate  50 mg Oral Q12H Leda Chand, DONALD      oxyCODONE  2 5 mg Oral Q4H PRN Leda Chand, DONALD      pantoprazole  40 mg Oral Early Morning Leda Chand, DONALD      pregabalin  75 mg Oral Daily Leda Chand, DONALD      ticagrelor  90 mg Oral Q12H Leda Chand, DONALD          Today, Patient Was Seen By: Parisa Mccarthy PA-C    **Please Note: This note may have been constructed using a voice recognition system  **

## 2022-07-22 NOTE — ASSESSMENT & PLAN NOTE
Wt Readings from Last 3 Encounters:   07/22/22 77 6 kg (171 lb)   07/19/22 78 2 kg (172 lb 6 4 oz)   06/29/22 81 7 kg (180 lb 3 2 oz)   · EF noted to be 50% on last echocardiogram earlier this year  · Received IV lasix upon arrival there for shortness of breath  · Appeared euvolemic upon arrival here  Diuretics were being held due to acute kidney injury    Given that creatinine is now close to baseline, we resumed her Bumex on 7/21/22 at half dose 1 mg p o  B i d  for now to prevent heart failure exacerbation; titrate up to her prior 2 mg BID if needed

## 2022-07-23 ENCOUNTER — APPOINTMENT (INPATIENT)
Dept: RADIOLOGY | Facility: HOSPITAL | Age: 57
DRG: 199 | End: 2022-07-23
Payer: COMMERCIAL

## 2022-07-23 ENCOUNTER — ANESTHESIA EVENT (INPATIENT)
Dept: ANESTHESIOLOGY | Facility: HOSPITAL | Age: 57
DRG: 199 | End: 2022-07-23
Payer: COMMERCIAL

## 2022-07-23 ENCOUNTER — ANESTHESIA (INPATIENT)
Dept: ANESTHESIOLOGY | Facility: HOSPITAL | Age: 57
DRG: 199 | End: 2022-07-23
Payer: COMMERCIAL

## 2022-07-23 PROBLEM — D72.829 LEUKOCYTOSIS: Status: ACTIVE | Noted: 2022-07-23

## 2022-07-23 LAB
ANION GAP SERPL CALCULATED.3IONS-SCNC: 8 MMOL/L (ref 4–13)
BASOPHILS # BLD AUTO: 0.08 THOUSANDS/ΜL (ref 0–0.1)
BASOPHILS NFR BLD AUTO: 0 % (ref 0–1)
BUN SERPL-MCNC: 38 MG/DL (ref 5–25)
CALCIUM SERPL-MCNC: 8.8 MG/DL (ref 8.4–10.2)
CHLORIDE SERPL-SCNC: 96 MMOL/L (ref 96–108)
CO2 SERPL-SCNC: 31 MMOL/L (ref 21–32)
CREAT SERPL-MCNC: 1.15 MG/DL (ref 0.6–1.3)
EOSINOPHIL # BLD AUTO: 0.45 THOUSAND/ΜL (ref 0–0.61)
EOSINOPHIL NFR BLD AUTO: 2 % (ref 0–6)
ERYTHROCYTE [DISTWIDTH] IN BLOOD BY AUTOMATED COUNT: 17 % (ref 11.6–15.1)
GFR SERPL CREATININE-BSD FRML MDRD: 53 ML/MIN/1.73SQ M
GLUCOSE SERPL-MCNC: 164 MG/DL (ref 65–140)
GLUCOSE SERPL-MCNC: 172 MG/DL (ref 65–140)
GLUCOSE SERPL-MCNC: 212 MG/DL (ref 65–140)
GLUCOSE SERPL-MCNC: 235 MG/DL (ref 65–140)
GLUCOSE SERPL-MCNC: 241 MG/DL (ref 65–140)
HCT VFR BLD AUTO: 31 % (ref 34.8–46.1)
HGB BLD-MCNC: 9.4 G/DL (ref 11.5–15.4)
IMM GRANULOCYTES # BLD AUTO: 0.27 THOUSAND/UL (ref 0–0.2)
IMM GRANULOCYTES NFR BLD AUTO: 1 % (ref 0–2)
LYMPHOCYTES # BLD AUTO: 0.95 THOUSANDS/ΜL (ref 0.6–4.47)
LYMPHOCYTES NFR BLD AUTO: 4 % (ref 14–44)
MCH RBC QN AUTO: 23.6 PG (ref 26.8–34.3)
MCHC RBC AUTO-ENTMCNC: 30.3 G/DL (ref 31.4–37.4)
MCV RBC AUTO: 78 FL (ref 82–98)
MONOCYTES # BLD AUTO: 1.8 THOUSAND/ΜL (ref 0.17–1.22)
MONOCYTES NFR BLD AUTO: 8 % (ref 4–12)
NEUTROPHILS # BLD AUTO: 18.17 THOUSANDS/ΜL (ref 1.85–7.62)
NEUTS SEG NFR BLD AUTO: 85 % (ref 43–75)
NRBC BLD AUTO-RTO: 0 /100 WBCS
PLATELET # BLD AUTO: 433 THOUSANDS/UL (ref 149–390)
PMV BLD AUTO: 9.6 FL (ref 8.9–12.7)
POTASSIUM SERPL-SCNC: 3.7 MMOL/L (ref 3.5–5.3)
RBC # BLD AUTO: 3.99 MILLION/UL (ref 3.81–5.12)
SODIUM SERPL-SCNC: 135 MMOL/L (ref 135–147)
WBC # BLD AUTO: 21.72 THOUSAND/UL (ref 4.31–10.16)

## 2022-07-23 PROCEDURE — 3E0T3BZ INTRODUCTION OF ANESTHETIC AGENT INTO PERIPHERAL NERVES AND PLEXI, PERCUTANEOUS APPROACH: ICD-10-PCS | Performed by: STUDENT IN AN ORGANIZED HEALTH CARE EDUCATION/TRAINING PROGRAM

## 2022-07-23 PROCEDURE — 94640 AIRWAY INHALATION TREATMENT: CPT

## 2022-07-23 PROCEDURE — 94760 N-INVAS EAR/PLS OXIMETRY 1: CPT

## 2022-07-23 PROCEDURE — 71045 X-RAY EXAM CHEST 1 VIEW: CPT

## 2022-07-23 PROCEDURE — C9290 INJ, BUPIVACAINE LIPOSOME: HCPCS | Performed by: STUDENT IN AN ORGANIZED HEALTH CARE EDUCATION/TRAINING PROGRAM

## 2022-07-23 PROCEDURE — 80048 BASIC METABOLIC PNL TOTAL CA: CPT | Performed by: PHYSICIAN ASSISTANT

## 2022-07-23 PROCEDURE — 82948 REAGENT STRIP/BLOOD GLUCOSE: CPT

## 2022-07-23 PROCEDURE — 99232 SBSQ HOSP IP/OBS MODERATE 35: CPT | Performed by: PHYSICIAN ASSISTANT

## 2022-07-23 PROCEDURE — 99232 SBSQ HOSP IP/OBS MODERATE 35: CPT | Performed by: INTERNAL MEDICINE

## 2022-07-23 PROCEDURE — 85025 COMPLETE CBC W/AUTO DIFF WBC: CPT | Performed by: PHYSICIAN ASSISTANT

## 2022-07-23 RX ORDER — HYDROMORPHONE HYDROCHLORIDE 2 MG/1
2 TABLET ORAL EVERY 4 HOURS PRN
Status: DISCONTINUED | OUTPATIENT
Start: 2022-07-23 | End: 2022-08-02 | Stop reason: HOSPADM

## 2022-07-23 RX ORDER — LOSARTAN POTASSIUM 50 MG/1
50 TABLET ORAL DAILY
Status: DISCONTINUED | OUTPATIENT
Start: 2022-07-24 | End: 2022-07-29

## 2022-07-23 RX ORDER — HYDROMORPHONE HYDROCHLORIDE 2 MG/1
4 TABLET ORAL EVERY 4 HOURS PRN
Status: DISCONTINUED | OUTPATIENT
Start: 2022-07-23 | End: 2022-08-02 | Stop reason: HOSPADM

## 2022-07-23 RX ORDER — INSULIN LISPRO 100 [IU]/ML
18 INJECTION, SOLUTION INTRAVENOUS; SUBCUTANEOUS
Status: DISCONTINUED | OUTPATIENT
Start: 2022-07-23 | End: 2022-07-28

## 2022-07-23 RX ORDER — BUPIVACAINE HYDROCHLORIDE 2.5 MG/ML
INJECTION, SOLUTION EPIDURAL; INFILTRATION; INTRACAUDAL AS NEEDED
Status: DISCONTINUED | OUTPATIENT
Start: 2022-07-23 | End: 2022-07-23

## 2022-07-23 RX ORDER — BUMETANIDE 1 MG/1
2 TABLET ORAL 2 TIMES DAILY
Status: DISCONTINUED | OUTPATIENT
Start: 2022-07-23 | End: 2022-07-24

## 2022-07-23 RX ORDER — BUPIVACAINE HYDROCHLORIDE 2.5 MG/ML
INJECTION, SOLUTION EPIDURAL; INFILTRATION; INTRACAUDAL AS NEEDED
Status: DISCONTINUED | OUTPATIENT
Start: 2022-07-23 | End: 2022-07-23 | Stop reason: HOSPADM

## 2022-07-23 RX ORDER — DIAZEPAM 5 MG/1
5 TABLET ORAL EVERY 6 HOURS PRN
Status: DISCONTINUED | OUTPATIENT
Start: 2022-07-23 | End: 2022-08-02 | Stop reason: HOSPADM

## 2022-07-23 RX ORDER — SPIRONOLACTONE 100 MG/1
100 TABLET, FILM COATED ORAL DAILY
Status: DISCONTINUED | OUTPATIENT
Start: 2022-07-23 | End: 2022-07-29

## 2022-07-23 RX ADMIN — SPIRONOLACTONE 100 MG: 100 TABLET ORAL at 17:04

## 2022-07-23 RX ADMIN — DIAZEPAM 5 MG: 5 TABLET ORAL at 21:08

## 2022-07-23 RX ADMIN — APIXABAN 5 MG: 5 TABLET, FILM COATED ORAL at 17:00

## 2022-07-23 RX ADMIN — METOPROLOL SUCCINATE 50 MG: 50 TABLET, EXTENDED RELEASE ORAL at 18:29

## 2022-07-23 RX ADMIN — ACETAMINOPHEN 975 MG: 325 TABLET ORAL at 06:56

## 2022-07-23 RX ADMIN — INSULIN LISPRO 2 UNITS: 100 INJECTION, SOLUTION INTRAVENOUS; SUBCUTANEOUS at 21:08

## 2022-07-23 RX ADMIN — INSULIN LISPRO 4 UNITS: 100 INJECTION, SOLUTION INTRAVENOUS; SUBCUTANEOUS at 09:22

## 2022-07-23 RX ADMIN — DESMOPRESSIN ACETATE 40 MG: 0.2 TABLET ORAL at 16:59

## 2022-07-23 RX ADMIN — TICAGRELOR 90 MG: 90 TABLET ORAL at 17:00

## 2022-07-23 RX ADMIN — BUPIVACAINE HYDROCHLORIDE 10 ML: 2.5 INJECTION, SOLUTION EPIDURAL; INFILTRATION; INTRACAUDAL at 08:45

## 2022-07-23 RX ADMIN — LEVALBUTEROL HYDROCHLORIDE 1.25 MG: 1.25 SOLUTION, CONCENTRATE RESPIRATORY (INHALATION) at 20:47

## 2022-07-23 RX ADMIN — TICAGRELOR 90 MG: 90 TABLET ORAL at 06:54

## 2022-07-23 RX ADMIN — PREGABALIN 75 MG: 75 CAPSULE ORAL at 09:19

## 2022-07-23 RX ADMIN — APIXABAN 5 MG: 5 TABLET, FILM COATED ORAL at 09:19

## 2022-07-23 RX ADMIN — IPRATROPIUM BROMIDE 0.5 MG: 0.5 SOLUTION RESPIRATORY (INHALATION) at 13:37

## 2022-07-23 RX ADMIN — AMIODARONE HYDROCHLORIDE 100 MG: 200 TABLET ORAL at 09:20

## 2022-07-23 RX ADMIN — FERROUS SULFATE TAB 325 MG (65 MG ELEMENTAL FE) 325 MG: 325 (65 FE) TAB at 09:20

## 2022-07-23 RX ADMIN — HYDROMORPHONE HYDROCHLORIDE 2 MG: 2 TABLET ORAL at 06:56

## 2022-07-23 RX ADMIN — LEVALBUTEROL HYDROCHLORIDE 1.25 MG: 1.25 SOLUTION, CONCENTRATE RESPIRATORY (INHALATION) at 08:48

## 2022-07-23 RX ADMIN — INSULIN LISPRO 14 UNITS: 100 INJECTION, SOLUTION INTRAVENOUS; SUBCUTANEOUS at 14:04

## 2022-07-23 RX ADMIN — HYDROMORPHONE HYDROCHLORIDE 4 MG: 2 TABLET ORAL at 17:32

## 2022-07-23 RX ADMIN — GUAIFENESIN 1200 MG: 600 TABLET ORAL at 09:18

## 2022-07-23 RX ADMIN — INSULIN LISPRO 18 UNITS: 100 INJECTION, SOLUTION INTRAVENOUS; SUBCUTANEOUS at 17:02

## 2022-07-23 RX ADMIN — INSULIN LISPRO 4 UNITS: 100 INJECTION, SOLUTION INTRAVENOUS; SUBCUTANEOUS at 14:04

## 2022-07-23 RX ADMIN — BUMETANIDE 2 MG: 1 TABLET ORAL at 17:00

## 2022-07-23 RX ADMIN — ACETAMINOPHEN 975 MG: 325 TABLET ORAL at 14:03

## 2022-07-23 RX ADMIN — IPRATROPIUM BROMIDE 0.5 MG: 0.5 SOLUTION RESPIRATORY (INHALATION) at 08:48

## 2022-07-23 RX ADMIN — METOPROLOL SUCCINATE 50 MG: 50 TABLET, EXTENDED RELEASE ORAL at 07:10

## 2022-07-23 RX ADMIN — INSULIN LISPRO 4 UNITS: 100 INJECTION, SOLUTION INTRAVENOUS; SUBCUTANEOUS at 17:01

## 2022-07-23 RX ADMIN — LEVALBUTEROL HYDROCHLORIDE 1.25 MG: 1.25 SOLUTION, CONCENTRATE RESPIRATORY (INHALATION) at 13:37

## 2022-07-23 RX ADMIN — IPRATROPIUM BROMIDE 0.5 MG: 0.5 SOLUTION RESPIRATORY (INHALATION) at 20:47

## 2022-07-23 RX ADMIN — BUMETANIDE 1 MG: 1 TABLET ORAL at 09:19

## 2022-07-23 RX ADMIN — ESCITALOPRAM OXALATE 10 MG: 10 TABLET ORAL at 09:18

## 2022-07-23 RX ADMIN — INSULIN LISPRO 14 UNITS: 100 INJECTION, SOLUTION INTRAVENOUS; SUBCUTANEOUS at 09:22

## 2022-07-23 RX ADMIN — PANTOPRAZOLE SODIUM 40 MG: 40 TABLET, DELAYED RELEASE ORAL at 06:55

## 2022-07-23 RX ADMIN — METHOCARBAMOL 500 MG: 500 TABLET ORAL at 06:55

## 2022-07-23 RX ADMIN — BUPIVACAINE 20 ML: 13.3 INJECTION, SUSPENSION, LIPOSOMAL INFILTRATION at 08:45

## 2022-07-23 RX ADMIN — GUAIFENESIN 1200 MG: 600 TABLET ORAL at 21:07

## 2022-07-23 RX ADMIN — INSULIN GLARGINE 33 UNITS: 100 INJECTION, SOLUTION SUBCUTANEOUS at 21:08

## 2022-07-23 NOTE — ASSESSMENT & PLAN NOTE
· Patient noted on imaging to have small pneumothorax at [facility], subsequently transferred to [facility]  · Chest tube placed 7/20, then upsized on 7/22 given no significant change an appearance that it was kinked  · No significant air leak  Currently on water seal   · Chest x-ray performed today shows interval resolution of pneumothorax  · Repeat chest x-ray in the morning  · Patient was having significant pain around chest tube area  Status post exparel nerve block by acute pain service 7/23/2022  Oral and IV Dilaudid PRN  Discontinue Ativan Robaxin  Will utilize Valium PRN given patient has already had significant improvement with nerve block and therefore will avoid scheduling this

## 2022-07-23 NOTE — ASSESSMENT & PLAN NOTE
· Patient apparently with history of VT cardiac arrest November 2021  · Now with life vest--must be on telemetry at all times  · Unclear why she has not yet followed up with EP to have ICD    Defer to outpatient cardiology

## 2022-07-23 NOTE — ASSESSMENT & PLAN NOTE
· On anticoagulation with Eliquis   · Did have bleeding from chest tube site initially  · On amiodarone and Toprol

## 2022-07-23 NOTE — CASE MANAGEMENT
Case Management Discharge Planning Note    Patient name Deena Rome  Location S /S -01 MRN [id number]  : 1965 Date 2022       Current Admission Date: 2022  Current Admission Diagnosis:Primary spontaneous pneumothorax   Patient Active Problem List    Diagnosis Date Noted    Hyponatremia 2022    Hyperkalemia 2022    Acute kidney injury superimposed on chronic kidney disease stage 3 ([address] ) 2022    Primary spontaneous pneumothorax 2022    Hemoptysis 2022    Hypomagnesemia 2022    Chest pain 2022    Moderate protein-calorie malnutrition ([address] ) 2022    Thoracic aortic aneurysm, ruptured ([facility] ) 2022    Shortness of breath 2022    Prolonged Q-T interval on ECG 2022    COVID-19 2022    Elevated troponin 2022    Microcytic anemia 2022    Insomnia secondary to anxiety 2022    Acute on chronic HFrEF (heart failure with reduced ejection fraction) ([facility] ) 2022    Chronic hypoxemic respiratory failure ([address] ) 03/15/2022    Tracheostomy in place [facility]) 2022    Subglottic stenosis 2022    CAD (coronary artery disease) 2021    Urinary retention 2021    Cerebral aneurysm 2021    Cerebral vascular accident ([facility] ) 2021    Acute on chronic respiratory failure with hypoxia ([address] ) 2021    History of Cardiac arrest ([address] ) 2021    A-fib ([address] ) 10/30/2021    History of Ventricular tachycardia ([address] ) 10/27/2021    MODESTO (acute kidney injury) ([facility] ) 10/24/2021    Cellulitis of left lower extremity 2021    Hypoglycemia 2021    Polypharmacy 2021    Trigger finger, right middle finger 2021    Trigger finger, right ring finger 2021    Diarrhea 2021    Nasal vestibulitis 2021    Orthopnea 2021    CHANTALE (obstructive sleep apnea) 2021    Palpitations 2020    Abscess 10/11/2020    Bacterial vaginosis 07/09/2020    Urinary tract infection with hematuria 02/03/2020    Epigastric pain 07/02/2019    Thoracic aortic aneurysm without rupture (HCC) 08/12/2018    Hypokalemia 08/11/2018    Abnormal urinalysis 08/11/2018    Vaginal discharge 04/12/2018    Yeast vaginitis 04/12/2018    Recurrent vaginitis 04/12/2018    Cardiac murmur 12/15/2017    Primary insomnia 12/15/2017    Anxiety 07/26/2017    Depression, recurrent ([facility] ) 07/26/2017    Retinal hemorrhage due to secondary diabetes ([facility] ) 07/26/2017    Fibromyalgia 07/26/2017    Hypertension 07/26/2017    Hypoestrogenism 07/26/2017    Neuropathy 07/26/2017    Chronic pain disorder 06/05/2017    Medically complex patient 06/05/2017    Change in bowel habit 04/24/2017    Screening for colon cancer 12/05/2016    Cough 02/15/2016    Non compliance with medical treatment 01/27/2016    Dizziness 01/26/2016    Gastroesophageal reflux disease with esophagitis 01/26/2016    Vertigo 01/26/2016    Vertebral body hemangioma 08/21/2015    Hemangioma of bone 07/30/2015    Right-sided thoracic back pain 07/23/2015    Thoracic radiculitis 07/23/2015    Carpal tunnel syndrome of left wrist 06/26/2015    Tobacco abuse 06/26/2015    Type 2 diabetes mellitus with hyperglycemia ([facility] ) 06/09/2015    Hyperlipidemia associated with type 2 diabetes mellitus ([facility] ) 05/06/2015    Noncompliance with diet and medication regimen 05/06/2015    Shingles 03/25/2015    Diabetic peripheral neuropathy ([facility] ) 02/25/2015    Low back pain 02/25/2015    Lumbar radiculopathy 02/25/2015    Overweight 02/25/2015    Sciatica of left side 02/25/2015      LOS (days): 4  Geometric Mean LOS (GMLOS) (days): 5 10  Days to GMLOS:1 1     OBJECTIVE:  Risk of Unplanned Readmission Score: 63 12         Current admission status: Inpatient   Preferred Pharmacy:   [address], [address] [address] [address]  Phone: [phone] Fax: [address], [address] [place]  [address] [address]  Phone: [phone] Fax: [phone]    [facility] [address], [place] [address] [facility] [address]  Phone: [phone] Fax: [phone] [facility], [facility] [address] [place] [address] [facility]  Phone: [phone] Fax: [phone]    Primary Care Provider: Erika Maynard MD    Primary Insurance: [facility]  Secondary Insurance: [employer]    DISCHARGE DETAILS:    Discharge planning discussed with[de-identified] Patient  Freedom of Choice: Yes  Comments - Freedom of Choice: Choice is for [facility] for [facility] provider  Requested [address]         Is the patient interested in [facility] at discharge?: Yes  [address] requested[de-identified] Nursing, Occupational Therapy, Physical [address] Name[de-identified] [facility] Provider[de-identified] PCP  Home Health Services Needed[de-identified] Evaluate Functional Status and Safety, Gait/ADL Training, Strengthening/Theraputic Exercises to Improve Function, Diabetes Management, Other (comment) (Tracheostomy management)  Homebound Criteria Met[de-identified] Uses an Assist Device (i e  cane, walker, etc), Requires the Assistance of Another Person for Safe Ambulation or to Leave the Home  Supporting Clincal Findings[de-identified] Fatigues Easliy in [employer], Limited Endurance     Other Referral/Resources/Interventions Provided:  Referral Comments: [facility] confirmed able to accept  AVS updated      Treatment Team Recommendation: Home with [address] [facility] [facility]  Discharge Destination Plan[de-identified] Home with [address] [facility])

## 2022-07-23 NOTE — ASSESSMENT & PLAN NOTE
Lab Results   Component Value Date    HGBA1C 12 7 (H) 05/22/2022     Recent Labs     07/22/22  1600 07/22/22  2111 07/23/22  0810 07/23/22  1253   POCGLU 272* 267* 235* 241*     Blood Sugar Average: Last 72 hrs:  · (P) 185 [id number]   · A m  Glucose 172   Continue Lantus 33 units q h s   · Increase mealtime coverage given mealtime hyperglycemia  · ADA diet

## 2022-07-23 NOTE — CONSULTS
Inpatient consult to Acute Pain Service  Consult performed by: Marko Azevedo MD  Consult ordered by: Obie Prader, PA-C        Progress Note - Acute Pain Service    Jossue Chung 65 y o  female MRN: [id number]  Unit/Bed#: S -01 Encounter: [id number]      Assessment:   Principal Problem:    Primary spontaneous pneumothorax  Active Problems:    Type 2 diabetes mellitus with hyperglycemia ([facility] )    A-fib ([facility] )    History of Cardiac arrest ([facility] )    CAD (coronary artery disease)    Tracheostomy in place [facility])    Acute on chronic HFrEF (heart failure with reduced ejection fraction) ([facility])    Shortness of breath    Hyperkalemia    Acute kidney injury superimposed on chronic kidney disease stage 3 ([facility] )    Jossue Chung is a 65 y o  female  With SOB, trach/ventilator dependent s/p MI in the past with subglottic stenosis  Noted to have small right pneumothorax, transferred to [place] for IR CT placement  Consulted for post procedure pain control  Pt seen and examined today, complaining of sharp and achy pain around chest tube site  No other source of pain at this time  Normally trach dependent on RA however requiring o2 flow at this time  Etiology of ptx at this time deemed 2/2 coughing or spontaneous  Currently on eliquis for heart history  Eating and drinking without issue, moving around in bed, however limited by CT requiring wall suction  Plan:   - Not candidate for epidural given anticoagulation  - Right sided Single Shot Erector Spinae Nerve Block with Exparel today  - Dilaudid 2-4mg q4hrs PRN mod-severe pain  - IV Dilaudid 0 5mg q4hrs PRN breakthrough pain  - D/c ativan  - D/c robaxin  - Add Valium 5mg QID for muscle spasm scheduled   - can reassess following CT removal  - Tylenol, Lidoderm, Lyrica as ordered  - Bowel regimen recommended while on opioids to prevent OIC  - Encourage IS/OOB as able    APS will continue to follow   Please contact Acute Pain Service - [facility] via [employer] from [phone] with additional questions or concerns  See Reji Silva for additional contacts and after hours information  Pain History  Current pain location(s): right chest  Pain Scale:   6  Quality: sharp, sore  24 hour history: as above    Opioid requirement previous 24 hours: 100mcg fentanyl, 6mg PO Diladuid, 5mg oxycodone    Meds/Allergies   all current active meds have been reviewed    Allergies   Allergen Reactions    Metformin Diarrhea    Clonidine Rash     Patch        Objective     Temp:  [98 4 °F (36 9 °C)-99 4 °F (37 4 °C)] 99 °F (37 2 °C)  HR:  [62-71] 69  Resp:  [16-18] 18  BP: (132-151)/(72-79) 141/79  FiO2 (%):  [28] 28    Physical Exam  Vitals and nursing note reviewed  Constitutional:       Appearance: Normal appearance  She is ill-appearing  HENT:      Head: Normocephalic and atraumatic  Mouth/Throat:      Mouth: Mucous membranes are moist    Eyes:      Extraocular Movements: Extraocular movements intact  Cardiovascular:      Rate and Rhythm: Normal rate  Pulmonary:      Effort: Respiratory distress present  Comments: Trach collar  Chest:      Chest wall: Tenderness present  Abdominal:      General: Abdomen is flat  Musculoskeletal:         General: No swelling  Skin:     General: Skin is warm and dry  Neurological:      Mental Status: She is alert and oriented to person, place, and time  Psychiatric:         Mood and Affect: Mood normal          Behavior: Behavior normal          Lab Results:   Results from last 7 days   Lab Units 07/23/22  0502   WBC Thousand/uL 21 72*   HEMOGLOBIN g/dL 9 4*   HEMATOCRIT % 31 0*   PLATELETS Thousands/uL 433*      Results from last 7 days   Lab Units 07/23/22  0502   POTASSIUM mmol/L 3 7   CHLORIDE mmol/L 96   CO2 mmol/L 31   BUN mg/dL 38*   CREATININE mg/dL 1 15   CALCIUM mg/dL 8 8       Imaging Studies: I have personally reviewed pertinent reports      EKG, Pathology, and Other Studies: I have personally reviewed pertinent reports  Counseling / Coordination of Care  Total floor / unit time spent today 20 minutes  Greater than 50% of total time was spent with the patient and / or family counseling and / or coordination of care  A description of the counseling / coordination of care: chart review, post op pain and regional/neuraxial pain management, discussion/planning with nursing/medical/surgical teams    Please note that the APS provides consultative services regarding pain management only  With the exception of ketamine and epidural infusions and except when indicated, final decisions regarding starting or changing doses of analgesic medications are at the discretion of the consulting service  Off hours consultation and/or medication management is generally not available      Sam Kilgore MD  Acute Pain Service

## 2022-07-23 NOTE — ASSESSMENT & PLAN NOTE
· Patient with significant increase in white count on today's laboratory studies as comparison to 3 days prior when white blood cell count was 12  Is now 21 7  · Patient with T-max of 99 4°    · Chest x-rays being performed secondary to chest tube insertion does not show any evidence of overt pneumonia  · No other source of infection identified  · Likely reactive in the setting of chest tube insertions and pneumothorax

## 2022-07-23 NOTE — ANESTHESIA PROCEDURE NOTES
Peripheral Block    Patient location during procedure: holding area  Start time: 7/23/2022 8:45 AM  Reason for block: procedure for pain, at surgeon's request and post-op pain management  Staffing  Performed: Anesthesiologist   Anesthesiologist: Telly Chery MD  Preanesthetic Checklist  Completed: patient identified, IV checked, site marked, risks and benefits discussed, surgical consent, monitors and equipment checked, pre-op evaluation and timeout performed  Peripheral Block  Patient position: supine  Prep: ChloraPrep  Patient monitoring: continuous pulse ox and frequent blood pressure checks  Anesthesia block type: Erector Spinae  Laterality: right  Injection technique: single-shot  Procedures: ultrasound guided, Ultrasound guidance required for the procedure to increase accuracy and safety of medication placement and decrease risk of complications    Ultrasound permanent image saved  Needle  Needle type: Stimuplex   Needle gauge: 22 G  Needle length: 10 cm  Needle localization: anatomical landmarks and ultrasound guidance  Test dose: negative  Assessment  Injection assessment: incremental injection, local visualized surrounding nerve on ultrasound, negative aspiration for CSF, negative aspiration for heme and transient paresthesias  Paresthesia pain: immediately resolved  Heart rate change: no  Slow fractionated injection: yes  Post-procedure:  site cleaned  patient tolerated the procedure well with no immediate complications  Additional Notes  Single needle pass, needle visualized throughout  Clear visualization of transverse processes, good separation of erector spinae muscle off os with resultant improvement in pain following injection over the next 15min

## 2022-07-23 NOTE — PROGRESS NOTES
[facility]  Progress Note - Magdaline Fabiola 1965, 65 y o  female MRN: [id number]  Unit/Bed#: S -01 Encounter: [id number]  Primary Care Provider: Esther Galvan MD   Date and time admitted to hospital: 7/19/2022 11:33 AM    * Primary spontaneous pneumothorax  Assessment & Plan  · Patient noted on imaging to have small pneumothorax at [facility], subsequently transferred to [facility]  · Chest tube placed 7/20, then upsized on 7/22 given no significant change an appearance that it was kinked  · No significant air leak  Currently on water seal   · Chest x-ray performed today shows interval resolution of pneumothorax  · Repeat chest x-ray in the morning  · Patient was having significant pain around chest tube area  Status post exparel nerve block by acute pain service 7/23/2022  Oral and IV Dilaudid PRN  Discontinue Ativan Robaxin  Will utilize Valium PRN given patient has already had significant improvement with nerve block and therefore will avoid scheduling this  Tracheostomy in place [facility])  Assessment & Plan  · Trach placed in the context of cardiac arrest and prolonged hospitalization November 2021  · Apparently patient comes to the hospital frequently for suctioning as she has thick sputum requiring frequent tracheostomy changes  · Per discussion with speech therapy,  video barium swallow was done for sense of food getting stuck  Appropriate for regular diet  · On trach collar O2 with humidification    History of Cardiac arrest [facility])  Assessment & Plan  · Patient apparently with history of VT cardiac arrest November 2021  · Now with life vest--must be on telemetry at all times  · Unclear why she has not yet followed up with EP to have ICD    Defer to outpatient cardiology    Acute on chronic HFrEF (heart failure with reduced ejection fraction) ([facility])  Assessment & Plan  Wt Readings from Last 3 Encounters:   07/23/22 78 kg (172 lb)   07/19/22 78 2 kg (172 lb 6 4 oz) 06/29/22 81 7 kg (180 lb 3 2 oz)   · EF noted to be 50% on last echocardiogram earlier this year  · Received IV lasix at [address] prior to transfer to [facility]  · Diuretics were being held due to acute kidney injury  · Given that creatinine improved, Bumex resumed on 7/21/22 at half dose 1 mg PO BID - will increase back to prior of 2 mg PO BID today    Leukocytosis  Assessment & Plan  · Patient with significant increase in white count on today's laboratory studies as comparison to 3 days prior when white blood cell count was 12  Is now 21 7  · Patient with T-max of 99 4°  · Chest x-rays being performed secondary to chest tube insertion does not show any evidence of overt pneumonia  · No other source of infection identified  · Likely reactive in the setting of chest tube insertions and pneumothorax    A-fib (HCC)  Assessment & Plan  · On anticoagulation with Eliquis   · Did have bleeding from chest tube site initially  · On amiodarone and Toprol    CAD (coronary artery disease)  Assessment & Plan  · History of MI oct 2021  · On beta-blocker, Brilinta and Lipitor  · Resume losartan however at half dose and resume spironolactone    Acute kidney injury superimposed on chronic kidney disease stage 3 [facility])  Assessment & Plan  Lab Results   Component Value Date    EGFR 53 07/23/2022    EGFR 48 07/22/2022    EGFR 47 07/21/2022    CREATININE 1 15 07/23/2022    CREATININE 1 24 07/22/2022    CREATININE 1 26 07/21/2022   · Creatinine elevated at 1 64 upon transfer on 7/19/22, with baseline being 0 9-1  Improved today to 1 15  · Resume higher doses of Bumex and monitor response  · Resume spironolactone as well as losartan however at 50% of dose instead of 50 b i d  Will resume 50 daily    Hyperkalemia  Assessment & Plan  · Potassium currently 3 7    · Resume spironolactone and ARB   · Bumex dose increasing  · Goal for K >4 given history of VT cardiac arrest  · K 6 1 on 7/20/22  · Was withheld from supplemental potassium, spironolactone and ARB and dose of Kayexalate was given    Type 2 diabetes mellitus with hyperglycemia [facility])  Assessment & Plan  Lab Results   Component Value Date    HGBA1C 12 7 (H) 2022     Recent Labs     22  1600 22  2111 22  0810 22  1253   POCGLU 272* 267* 235* 241*     Blood Sugar Average: Last 72 hrs:  · (P) 819 [id number]   · A m  Glucose 172  Continue Lantus 33 units q h s   · Increase mealtime coverage given mealtime hyperglycemia  · ADA diet        VTE Pharmacologic Prophylaxis: VTE Score: 3 Moderate Risk (Score 3-4) - Pharmacological DVT Prophylaxis Ordered: apixaban (Eliquis)  Patient Centered Rounds: I performed bedside rounds with nursing staff today  Discussions with Specialists or Other Care Team Provider: nursing, pulm    Education and Discussions with Family / Patient: Patient declined call to   Time Spent for Care: 30 minutes  More than 50% of total time spent on counseling and coordination of care as described above  Current Length of Stay: 4 day(s)  Current Patient Status: Inpatient   Certification Statement: The patient will continue to require additional inpatient hospital stay due to chest tube  Discharge Plan: Anticipate discharge in 48 hrs to home  Code Status: Level 1 - Full Code    Subjective:   Patient reports that she is feeling much more relaxed and pain is much better controlled after nerve block this morning  She has no current complaints given she is feeling much better  She denies my offer to call family to update them  Objective:     Vitals:   Temp (24hrs), Av 2 °F (37 3 °C), Min:99 °F (37 2 °C), Max:99 4 °F (37 4 °C)    Temp:  [99 °F (37 2 °C)-99 4 °F (37 4 °C)] 99 °F (37 2 °C)  HR:  [68-71] 69  Resp:  [18] 18  BP: (132-151)/(72-79) 141/79  SpO2:  [93 %-100 %] 94 %  Body mass index is 25 4 kg/m²  Input and Output Summary (last 24 hours):      Intake/Output Summary (Last 24 hours) at 2022 [address] [place] [address] filed at 7/23/2022 0700  Gross per 24 hour   Intake --   Output 880 ml   Net -880 ml       Physical Exam:   Physical Exam  Vitals and nursing note reviewed  Constitutional:       General: She is not in acute distress  Appearance: Normal appearance  She is ill-appearing  She is not diaphoretic  HENT:      Head: Normocephalic and atraumatic  Mouth/Throat:      Mouth: Mucous membranes are dry  Cardiovascular:      Rate and Rhythm: Normal rate and regular rhythm  Heart sounds: No murmur heard  Pulmonary:      Effort: Pulmonary effort is normal       Breath sounds: Normal breath sounds  No stridor  No wheezing, rhonchi or rales  Comments: Chest tube in place and connected to wall suction  No distress  Abdominal:      General: Bowel sounds are normal       Palpations: Abdomen is soft  There is no mass  Tenderness: There is no abdominal tenderness  There is no guarding  Musculoskeletal:      Right lower leg: No edema  Left lower leg: No edema  Skin:     General: Skin is warm and dry  Neurological:      Mental Status: She is alert  Comments: Awake, alert, oriented  Following commands  Appears comfortable     Psychiatric:         Mood and Affect: Mood normal          Behavior: Behavior normal           Additional Data:     Labs:  Results from last 7 days   Lab Units 07/23/22  0502   WBC Thousand/uL 21 72*   HEMOGLOBIN g/dL 9 4*   HEMATOCRIT % 31 0*   PLATELETS Thousands/uL 433*   NEUTROS PCT % 85*   LYMPHS PCT % 4*   MONOS PCT % 8   EOS PCT % 2     Results from last 7 days   Lab Units 07/23/22  0502 07/19/22  0510 07/18/22  0513   SODIUM mmol/L 135   < > 136   POTASSIUM mmol/L 3 7   < > 4 3   CHLORIDE mmol/L 96   < > 93*   CO2 mmol/L 31   < > 32   BUN mg/dL 38*   < > 32*   CREATININE mg/dL 1 15   < > 1 30   ANION GAP mmol/L 8   < > 11   CALCIUM mg/dL 8 8   < > 9 6   ALBUMIN g/dL  --   --  3 2*   GLUCOSE RANDOM mg/dL 172*   < > 194*    < > = values in this interval not displayed           Results from last 7 days   Lab Units 07/23/22  1253 07/23/22  0810 07/22/22  2111 07/22/22  1600 07/22/22  1031 07/22/22  0640 07/21/22  2046 07/21/22  1603 07/21/22  1136 07/21/22  0741 07/20/22  2100 07/20/22  1614   POC GLUCOSE mg/dl 241* 235* 267* 272* 219* 258* 225* 178* 232* 278* 167* 213*         Results from last 7 days   Lab Units 07/19/22  1359 07/19/22  0510 07/18/22  0843   PROCALCITONIN ng/ml 0 22 0 27* 0 24       Lines/Drains:  Invasive Devices  Report    Peripheral Intravenous Line  Duration           Peripheral IV 07/20/22 Right Antecubital 3 days          Drain  Duration           Chest Tube 1 Right Other (Comment) 14 Fr  1 day          Airway  Duration           Surgical Airway Shiley Fenestrated 143 days                  Telemetry:  Telemetry Orders (From admission, onward)             LifeVest Patient: Continuous Telemetry Monitoring during hospitalization (non-expiring)  Continuous LifeVest Telemetry Monitoring        References:    LifeVest Policy                 Telemetry Reviewed: Normal Sinus Rhythm and no VT  Indication for Continued Telemetry Use: Lifevest (remains on tele entire hospital stay)             Imaging: Reviewed radiology reports from this admission including: chest xray    Recent Cultures (last 7 days):         Last 24 Hours Medication List:   Current Facility-Administered Medications   Medication Dose Route Frequency Provider Last Rate    acetaminophen  975 mg Oral Q6H [facility] Leda Chand PA-C      albuterol  2 5 mg Nebulization Q4H PRN Leda Chand PA-C      amiodarone  100 mg Oral Daily With Breakfast Leda Chand PA-C      apixaban  5 mg Oral BID Leda Chand PA-C      atorvastatin  40 mg Oral Daily With [employer], DONALD      benzonatate  100 mg Oral TID PRN Edel Taylor MD      bumetanide  2 mg Oral BID Etta Nelson PA-C      diazepam  5 mg Oral Q6H PRN Etta Nelson PA-C      escitalopram  10 mg Oral Daily Leda Chand PA-C      fentanyl citrate (PF)   Intravenous Code/Trauma/Sedation Med Socorro Abbott MD      ferrous sulfate  325 mg Oral Daily With Breakfast Leda Chand PA-C      guaiFENesin  1,200 mg Oral Q12H [address] Leda Chand PA-C      HYDROmorphone  2 mg Oral Q4H PRN Ratna Reina PA-C      HYDROmorphone  4 mg Oral Q4H PRN Etta Nelson PA-C      insulin glargine  33 Units Subcutaneous HS Leda Chand PA-C      insulin lispro  18 Units Subcutaneous TID With Meals Etta Nelson PA-C      insulin lispro  2-12 Units Subcutaneous TID AC Leda Chand PA-C      insulin lispro  2-12 Units Subcutaneous HS Leda Chand PA-C      ipratropium  0 5 mg Nebulization TID Leda Chand PA-C      levalbuterol  1 25 mg Nebulization TID Devan Gonzalez PA-C      lidocaine  1 patch Topical Daily Leda Chand PA-C      [START ON 7/24/2022] losartan  50 mg Oral Daily Etta Nelson PA-C      metoprolol succinate  50 mg Oral Q12H Leda Chand PA-C      pantoprazole  40 mg Oral Early Morning Leda Chand PA-C      pregabalin  75 mg Oral Daily Leda Chand PA-C      spironolactone  100 mg Oral Daily Etta Nelson PA-C      ticagrelor  90 mg Oral Q12H Leda Chand PA-C          Today, Patient Was Seen By: Ratna Reina PA-C    **Please Note: This note may have been constructed using a voice recognition system  **

## 2022-07-23 NOTE — ASSESSMENT & PLAN NOTE
· Trach placed in the context of cardiac arrest and prolonged hospitalization November 2021  · Apparently patient comes to the hospital frequently for suctioning as she has thick sputum requiring frequent tracheostomy changes  · Per discussion with speech therapy,  video barium swallow was done for sense of food getting stuck   Appropriate for regular diet  · On trach collar O2 with humidification

## 2022-07-23 NOTE — ASSESSMENT & PLAN NOTE
Wt Readings from Last 3 Encounters:   07/23/22 78 kg (172 lb)   07/19/22 78 2 kg (172 lb 6 4 oz)   06/29/22 81 7 kg (180 lb 3 2 oz)   · EF noted to be 50% on last echocardiogram earlier this year  · Received IV lasix at [address] prior to transfer [facility]  · Diuretics were being held due to acute kidney injury     · Given that creatinine improved, Bumex resumed on 7/21/22 at half dose 1 mg PO BID - will increase back to prior of 2 mg PO BID today

## 2022-07-23 NOTE — ASSESSMENT & PLAN NOTE
Lab Results   Component Value Date    EGFR 53 07/23/2022    EGFR 48 07/22/2022    EGFR 47 07/21/2022    CREATININE 1 15 07/23/2022    CREATININE 1 24 07/22/2022    CREATININE 1 26 07/21/2022   · Creatinine elevated at 1 64 upon transfer on 7/19/22, with baseline being 0 9-1  Improved today to 1 15  · Resume higher doses of Bumex and monitor response  · Resume spironolactone as well as losartan however at 50% of dose instead of 50 b i d   Will resume 50 daily

## 2022-07-23 NOTE — ASSESSMENT & PLAN NOTE
· History of MI oct 2021  · On beta-blocker, Brilinta and Lipitor  · Resume losartan however at half dose and resume spironolactone

## 2022-07-23 NOTE — ASSESSMENT & PLAN NOTE
· Potassium currently 3 7    · Resume spironolactone and ARB   · Bumex dose increasing  · Goal for K >4 given history of VT cardiac arrest  · K 6 1 on 7/20/22  · Was withheld from supplemental potassium, spironolactone and ARB and dose of Kayexalate was given

## 2022-07-24 ENCOUNTER — APPOINTMENT (INPATIENT)
Dept: RADIOLOGY | Facility: HOSPITAL | Age: 57
DRG: 199 | End: 2022-07-24
Payer: COMMERCIAL

## 2022-07-24 PROBLEM — R06.89 RESPIRATORY INSUFFICIENCY: Status: ACTIVE | Noted: 2022-07-24

## 2022-07-24 PROBLEM — R06.03 RESPIRATORY DISTRESS: Status: ACTIVE | Noted: 2022-07-24

## 2022-07-24 LAB
ANION GAP SERPL CALCULATED.3IONS-SCNC: 12 MMOL/L (ref 4–13)
ATRIAL RATE: 66 BPM
BASOPHILS # BLD AUTO: 0.06 THOUSANDS/ΜL (ref 0–0.1)
BASOPHILS NFR BLD AUTO: 0 % (ref 0–1)
BNP SERPL-MCNC: 165 PG/ML (ref 0–100)
BUN SERPL-MCNC: 36 MG/DL (ref 5–25)
CALCIUM SERPL-MCNC: 9.3 MG/DL (ref 8.4–10.2)
CHLORIDE SERPL-SCNC: 94 MMOL/L (ref 96–108)
CO2 SERPL-SCNC: 30 MMOL/L (ref 21–32)
CREAT SERPL-MCNC: 1.03 MG/DL (ref 0.6–1.3)
EOSINOPHIL # BLD AUTO: 0.05 THOUSAND/ΜL (ref 0–0.61)
EOSINOPHIL NFR BLD AUTO: 0 % (ref 0–6)
ERYTHROCYTE [DISTWIDTH] IN BLOOD BY AUTOMATED COUNT: 17.2 % (ref 11.6–15.1)
GFR SERPL CREATININE-BSD FRML MDRD: 60 ML/MIN/1.73SQ M
GLUCOSE SERPL-MCNC: 107 MG/DL (ref 65–140)
GLUCOSE SERPL-MCNC: 132 MG/DL (ref 65–140)
GLUCOSE SERPL-MCNC: 153 MG/DL (ref 65–140)
GLUCOSE SERPL-MCNC: 190 MG/DL (ref 65–140)
GLUCOSE SERPL-MCNC: 98 MG/DL (ref 65–140)
HCT VFR BLD AUTO: 28.9 % (ref 34.8–46.1)
HGB BLD-MCNC: 9 G/DL (ref 11.5–15.4)
IMM GRANULOCYTES # BLD AUTO: 0.24 THOUSAND/UL (ref 0–0.2)
IMM GRANULOCYTES NFR BLD AUTO: 1 % (ref 0–2)
LYMPHOCYTES # BLD AUTO: 1.13 THOUSANDS/ΜL (ref 0.6–4.47)
LYMPHOCYTES NFR BLD AUTO: 5 % (ref 14–44)
MAGNESIUM SERPL-MCNC: 1.6 MG/DL (ref 1.9–2.7)
MCH RBC QN AUTO: 23.6 PG (ref 26.8–34.3)
MCHC RBC AUTO-ENTMCNC: 31.1 G/DL (ref 31.4–37.4)
MCV RBC AUTO: 76 FL (ref 82–98)
MONOCYTES # BLD AUTO: 1.55 THOUSAND/ΜL (ref 0.17–1.22)
MONOCYTES NFR BLD AUTO: 7 % (ref 4–12)
NEUTROPHILS # BLD AUTO: 18.14 THOUSANDS/ΜL (ref 1.85–7.62)
NEUTS SEG NFR BLD AUTO: 87 % (ref 43–75)
NRBC BLD AUTO-RTO: 0 /100 WBCS
P AXIS: 36 DEGREES
PLATELET # BLD AUTO: 449 THOUSANDS/UL (ref 149–390)
PMV BLD AUTO: 10.2 FL (ref 8.9–12.7)
POTASSIUM SERPL-SCNC: 3.2 MMOL/L (ref 3.5–5.3)
PR INTERVAL: 172 MS
QRS AXIS: -35 DEGREES
QRSD INTERVAL: 120 MS
QT INTERVAL: 486 MS
QTC INTERVAL: 509 MS
RBC # BLD AUTO: 3.82 MILLION/UL (ref 3.81–5.12)
SODIUM SERPL-SCNC: 136 MMOL/L (ref 135–147)
T WAVE AXIS: 5 DEGREES
VENTRICULAR RATE: 66 BPM
WBC # BLD AUTO: 21.17 THOUSAND/UL (ref 4.31–10.16)

## 2022-07-24 PROCEDURE — 99233 SBSQ HOSP IP/OBS HIGH 50: CPT | Performed by: PHYSICIAN ASSISTANT

## 2022-07-24 PROCEDURE — 71045 X-RAY EXAM CHEST 1 VIEW: CPT

## 2022-07-24 PROCEDURE — 99223 1ST HOSP IP/OBS HIGH 75: CPT | Performed by: INTERNAL MEDICINE

## 2022-07-24 PROCEDURE — 94640 AIRWAY INHALATION TREATMENT: CPT

## 2022-07-24 PROCEDURE — 83880 ASSAY OF NATRIURETIC PEPTIDE: CPT | Performed by: NURSE PRACTITIONER

## 2022-07-24 PROCEDURE — 94760 N-INVAS EAR/PLS OXIMETRY 1: CPT

## 2022-07-24 PROCEDURE — 99232 SBSQ HOSP IP/OBS MODERATE 35: CPT | Performed by: INTERNAL MEDICINE

## 2022-07-24 PROCEDURE — 82948 REAGENT STRIP/BLOOD GLUCOSE: CPT

## 2022-07-24 PROCEDURE — 83735 ASSAY OF MAGNESIUM: CPT | Performed by: PHYSICIAN ASSISTANT

## 2022-07-24 PROCEDURE — 93005 ELECTROCARDIOGRAM TRACING: CPT

## 2022-07-24 PROCEDURE — 80048 BASIC METABOLIC PNL TOTAL CA: CPT | Performed by: PHYSICIAN ASSISTANT

## 2022-07-24 PROCEDURE — 93010 ELECTROCARDIOGRAM REPORT: CPT | Performed by: INTERNAL MEDICINE

## 2022-07-24 PROCEDURE — 85025 COMPLETE CBC W/AUTO DIFF WBC: CPT | Performed by: PHYSICIAN ASSISTANT

## 2022-07-24 RX ORDER — POTASSIUM CHLORIDE 20 MEQ/1
40 TABLET, EXTENDED RELEASE ORAL ONCE
Status: DISCONTINUED | OUTPATIENT
Start: 2022-07-24 | End: 2022-07-24

## 2022-07-24 RX ORDER — HYDROMORPHONE HCL/PF 1 MG/ML
0.5 SYRINGE (ML) INJECTION ONCE
Status: COMPLETED | OUTPATIENT
Start: 2022-07-24 | End: 2022-07-24

## 2022-07-24 RX ORDER — BUMETANIDE 0.25 MG/ML
1 INJECTION, SOLUTION INTRAMUSCULAR; INTRAVENOUS ONCE
Status: COMPLETED | OUTPATIENT
Start: 2022-07-24 | End: 2022-07-24

## 2022-07-24 RX ORDER — POTASSIUM CHLORIDE 20MEQ/15ML
40 LIQUID (ML) ORAL 2 TIMES DAILY
Status: DISCONTINUED | OUTPATIENT
Start: 2022-07-24 | End: 2022-07-26

## 2022-07-24 RX ORDER — BUMETANIDE 0.25 MG/ML
2 INJECTION, SOLUTION INTRAMUSCULAR; INTRAVENOUS
Status: DISCONTINUED | OUTPATIENT
Start: 2022-07-24 | End: 2022-07-26

## 2022-07-24 RX ORDER — METHOCARBAMOL 500 MG/1
500 TABLET, FILM COATED ORAL EVERY 6 HOURS SCHEDULED
Status: DISCONTINUED | OUTPATIENT
Start: 2022-07-24 | End: 2022-07-27

## 2022-07-24 RX ORDER — HYDROMORPHONE HCL/PF 1 MG/ML
0.5 SYRINGE (ML) INJECTION EVERY 2 HOUR PRN
Status: DISCONTINUED | OUTPATIENT
Start: 2022-07-24 | End: 2022-08-02 | Stop reason: HOSPADM

## 2022-07-24 RX ADMIN — GUAIFENESIN 1200 MG: 600 TABLET ORAL at 09:53

## 2022-07-24 RX ADMIN — APIXABAN 5 MG: 5 TABLET, FILM COATED ORAL at 17:51

## 2022-07-24 RX ADMIN — METOPROLOL SUCCINATE 50 MG: 50 TABLET, EXTENDED RELEASE ORAL at 08:03

## 2022-07-24 RX ADMIN — INSULIN LISPRO 2 UNITS: 100 INJECTION, SOLUTION INTRAVENOUS; SUBCUTANEOUS at 18:01

## 2022-07-24 RX ADMIN — LEVALBUTEROL HYDROCHLORIDE 1.25 MG: 1.25 SOLUTION, CONCENTRATE RESPIRATORY (INHALATION) at 08:45

## 2022-07-24 RX ADMIN — GUAIFENESIN 1200 MG: 600 TABLET ORAL at 21:18

## 2022-07-24 RX ADMIN — IPRATROPIUM BROMIDE 0.5 MG: 0.5 SOLUTION RESPIRATORY (INHALATION) at 20:37

## 2022-07-24 RX ADMIN — KETAMINE HYDROCHLORIDE 0.1 MG/KG/HR: 50 INJECTION, SOLUTION INTRAMUSCULAR; INTRAVENOUS at 11:26

## 2022-07-24 RX ADMIN — IPRATROPIUM BROMIDE 0.5 MG: 0.5 SOLUTION RESPIRATORY (INHALATION) at 14:00

## 2022-07-24 RX ADMIN — BUMETANIDE 1 MG: 0.25 INJECTION INTRAMUSCULAR; INTRAVENOUS at 02:09

## 2022-07-24 RX ADMIN — POTASSIUM CHLORIDE 40 MEQ: 20 SOLUTION ORAL at 12:48

## 2022-07-24 RX ADMIN — DESMOPRESSIN ACETATE 40 MG: 0.2 TABLET ORAL at 17:51

## 2022-07-24 RX ADMIN — ESCITALOPRAM OXALATE 10 MG: 10 TABLET ORAL at 09:53

## 2022-07-24 RX ADMIN — METOPROLOL SUCCINATE 50 MG: 50 TABLET, EXTENDED RELEASE ORAL at 17:52

## 2022-07-24 RX ADMIN — BUMETANIDE 2 MG: 0.25 INJECTION INTRAMUSCULAR; INTRAVENOUS at 12:49

## 2022-07-24 RX ADMIN — LEVALBUTEROL HYDROCHLORIDE 1.25 MG: 1.25 SOLUTION, CONCENTRATE RESPIRATORY (INHALATION) at 20:37

## 2022-07-24 RX ADMIN — BENZONATATE 100 MG: 100 CAPSULE ORAL at 00:48

## 2022-07-24 RX ADMIN — HYDROMORPHONE HYDROCHLORIDE 2 MG: 2 TABLET ORAL at 21:18

## 2022-07-24 RX ADMIN — HYDROMORPHONE HYDROCHLORIDE 4 MG: 2 TABLET ORAL at 00:48

## 2022-07-24 RX ADMIN — LOSARTAN POTASSIUM 50 MG: 50 TABLET, FILM COATED ORAL at 09:54

## 2022-07-24 RX ADMIN — PANTOPRAZOLE SODIUM 40 MG: 40 TABLET, DELAYED RELEASE ORAL at 08:02

## 2022-07-24 RX ADMIN — BUMETANIDE 2 MG: 0.25 INJECTION INTRAMUSCULAR; INTRAVENOUS at 17:51

## 2022-07-24 RX ADMIN — FERROUS SULFATE TAB 325 MG (65 MG ELEMENTAL FE) 325 MG: 325 (65 FE) TAB at 10:01

## 2022-07-24 RX ADMIN — LEVALBUTEROL HYDROCHLORIDE 1.25 MG: 1.25 SOLUTION, CONCENTRATE RESPIRATORY (INHALATION) at 14:00

## 2022-07-24 RX ADMIN — APIXABAN 5 MG: 5 TABLET, FILM COATED ORAL at 09:53

## 2022-07-24 RX ADMIN — PREGABALIN 75 MG: 75 CAPSULE ORAL at 09:54

## 2022-07-24 RX ADMIN — METHOCARBAMOL 500 MG: 500 TABLET ORAL at 12:50

## 2022-07-24 RX ADMIN — BUMETANIDE 2 MG: 1 TABLET ORAL at 09:53

## 2022-07-24 RX ADMIN — AMIODARONE HYDROCHLORIDE 100 MG: 200 TABLET ORAL at 10:02

## 2022-07-24 RX ADMIN — SPIRONOLACTONE 100 MG: 100 TABLET ORAL at 09:54

## 2022-07-24 RX ADMIN — INSULIN LISPRO 18 UNITS: 100 INJECTION, SOLUTION INTRAVENOUS; SUBCUTANEOUS at 12:52

## 2022-07-24 RX ADMIN — POTASSIUM CHLORIDE 40 MEQ: 20 SOLUTION ORAL at 17:53

## 2022-07-24 RX ADMIN — TICAGRELOR 90 MG: 90 TABLET ORAL at 08:04

## 2022-07-24 RX ADMIN — DIAZEPAM 5 MG: 5 TABLET ORAL at 05:10

## 2022-07-24 RX ADMIN — IPRATROPIUM BROMIDE 0.5 MG: 0.5 SOLUTION RESPIRATORY (INHALATION) at 08:45

## 2022-07-24 RX ADMIN — TICAGRELOR 90 MG: 90 TABLET ORAL at 17:52

## 2022-07-24 RX ADMIN — HYDROMORPHONE HYDROCHLORIDE 0.5 MG: 1 INJECTION, SOLUTION INTRAMUSCULAR; INTRAVENOUS; SUBCUTANEOUS at 23:27

## 2022-07-24 RX ADMIN — DIAZEPAM 5 MG: 5 TABLET ORAL at 13:28

## 2022-07-24 RX ADMIN — HYDROMORPHONE HYDROCHLORIDE 0.5 MG: 1 INJECTION, SOLUTION INTRAMUSCULAR; INTRAVENOUS; SUBCUTANEOUS at 08:56

## 2022-07-24 RX ADMIN — METHOCARBAMOL 500 MG: 500 TABLET ORAL at 17:52

## 2022-07-24 RX ADMIN — INSULIN GLARGINE 33 UNITS: 100 INJECTION, SOLUTION SUBCUTANEOUS at 21:18

## 2022-07-24 RX ADMIN — INSULIN LISPRO 18 UNITS: 100 INJECTION, SOLUTION INTRAVENOUS; SUBCUTANEOUS at 18:01

## 2022-07-24 NOTE — ASSESSMENT & PLAN NOTE
Lab Results   Component Value Date    EGFR 60 07/24/2022    EGFR 53 07/23/2022    EGFR 48 07/22/2022    CREATININE 1 03 07/24/2022    CREATININE 1 15 07/23/2022    CREATININE 1 24 07/22/2022   · Creatinine elevated at 1 64 upon transfer on 7/19/22, with baseline being 0 9-1 0  · Currently 1 03  · Monitor with increased diuresis  · Resumed spironolactone as well as losartan on 7/23

## 2022-07-24 NOTE — ASSESSMENT & PLAN NOTE
· STEMI 10/22/21 - PATRICA to mid circumflex  · VT arrest 10/26   · Polymorphic VT x 1 [id number]  · ICD not placed given risks felt to outweigh benefits with cognitive function and risk of infection at that that time  · Cardiac arrest 1/1/22 - likely VT related - went to [facility]  · No ICD given lung infection and on IV ABX x 4 weeks  · Canceled 2 EP appts since that time and thus no ICD in place - still wears LifeVest  · EF seems to have always been 40% or higher

## 2022-07-24 NOTE — PROGRESS NOTES
[facility]  Progress Note - Brittanie Wellso 1965, 65 y o  female MRN: [id number]  Unit/Bed#: S -01 Encounter: [id number]  Primary Care Provider: Shyann Sierra MD   Date and time admitted to hospital: 7/19/2022 11:33 AM    Respiratory Distress  Assessment & Plan  · Overnight the patient had increasing oxygen requirements and difficulty breathing, received Bumex repeat chest which did not show any worsening of her known pneumothorax  Chest tube remains on water seal   · This morning patient had once again increasing shortness of breath, pain and distress  · Suctioned with no significant change  · Chest x-ray showed no significant change from earlier  · Chest tube clamped  Repeat chest x-ray in 4 hours and hopefully remove chest tube at that time given suspect large amount of this is related to uncontrolled pain from chest tube site  · IV diuretics given earlier  Monitor response given mild volume overload    * Primary spontaneous pneumothorax  Assessment & Plan  · Patient noted on imaging to have small pneumothorax at [facility], subsequently [facility]  · Chest tube placed 7/20, then upsized on 7/22 given no significant change an appearance that it was kinked  · Chest x-ray performed 7/23 shows interval resolution of pneumothorax - patient had been on water seal overnight  She developed worsening shortness of breath, hypoxia and increasing pain over the chest tube site  Chest x-ray showed no pneumothorax at that time  · Patient this morning once again with increasing shortness of breath and pain  · Discussed with Critical Care, anesthesia and pulmonary  · Administer IV Dilaudid, initiate IV ketamine for pain control  · Clamp chest tube  Repeat chest x-ray at 1:00 p m   If this shows no pneumo, can proceed with removal of chest tube today  · Patient was having significant pain around chest tube area    Status post exparel nerve block by acute pain service 7/23/2022 which resulted in significant pain relief however only lasted for short duration of time  Given this, acute pain service/anesthesia initiating ketamine today  Tracheostomy in place [facility])  Assessment & Plan  · Trach placed in the context of cardiac arrest/prolonged ventilator dependent state November 2021  · Decannulated at [facility] 12/11/21  · Patient requires frequent tracheostomy changes and suctioning  · Per discussion with speech therapy,  video barium swallow was done for sense of food getting stuck   Appropriate for regular diet  · On trach collar O2 with humidification    History of Cardiac arrest [facility])  Assessment & Plan  · STEMI 10/22/21 - PATRICA to mid circumflex  · VT arrest 10/26   · Polymorphic VT x 1 [id number]/28/21  · ICD not placed given risks felt to outweigh benefits with cognitive function and risk of infection at that that time  · Cardiac arrest 1/1/22 - likely VT related - went to [facility]  · No ICD given lung infection and on IV ABX x 4 weeks  · Canceled 2 EP appts since that time and thus no ICD in place - still wears LifeVest  · EF seems to have always been 40% or higher    Acute on chronic HFrEF (heart failure with reduced ejection fraction) ([address] )  Assessment & Plan  Wt Readings from Last 3 Encounters:   07/23/22 78 kg (172 lb)   07/19/22 78 2 kg (172 lb 6 4 oz)   06/29/22 81 7 kg (180 lb 3 2 oz)   · EF noted to be 50% on last echocardiogram 2/22  · Admitted to [facility] for CHF exac on 7/15 - given 80 mg IV lasix and then placed back on bumex 2 mg PO BID  · Developed MODESTO and thus bumex dose reduced - got 2 mg on 7/19, no bumex on on 7/20, 1 mg on 7/21, 2 mg on 7/23, 3 mg on 7/23 (home dose had been bumex 2 mg PO BID)  · Given that creatinine improved, Bumex resumed on 7/23 at home dose  · 7/24 AM with increasing SOB, oxygen requiements - given 1 mg IV bumex with 4-5 episodes of urine output (amt not recorded) - weights unchanged  · CXR shows mild vascular congestion  · Suspect iatrogenic volume overload in the setting of reduced/withheld diuretic dosing  · Consult cardiology  · Continue PO bumex 2 mg PO BID until discussion with them - suspect will change to IV dosing    Leukocytosis  Assessment & Plan  · Patient with significant increase in WBC as of 7/23 compared to 3 days prior when white blood cell count was 12   · Increased to 21  · Repeat CBC with diff now  · Patient with T-max of 99 7°  · Chest x-rays being performed secondary to chest tube do not show any evidence of overt pneumonia  · No other source of infection identified  · Likely reactive in the setting of chest tube insertions and pneumothorax    A-fib (HCC)  Assessment & Plan  · On anticoagulation with Eliquis   · Did have bleeding from chest tube site initially however this has subsequently stopped  · On amiodarone and Toprol which will be continued    CAD (coronary artery disease)  Assessment & Plan  · STEMI 10/22/2021 s/p PATRICA mid circumflex OM1  OM2 was ballooned but not stented  · Pulseless VT 10/27/21 - repeat LHC 90% mid circumflex stenosis, but could not be intervened on  · VT 10/28/21 LHC:  found to have apical LAD complete occlusion was felt to be small to be intervened    · Cardiac carrest 1/1/22 - NO cardiac cath [facility] felt to be hypoxic vs  VT induced  · On beta-blocker, Brilinta and Lipitor  · Resumed losartan however at half dose 7/23 to see if BP tolerates and resumed spironolactone 7/23    Acute kidney injury superimposed on chronic kidney disease stage 3 [facility])  Assessment & Plan  Lab Results   Component Value Date    EGFR 60 07/24/2022    EGFR 53 07/23/2022    EGFR 48 07/22/2022    CREATININE 1 03 07/24/2022    CREATININE 1 15 07/23/2022    CREATININE 1 24 07/22/2022   · Creatinine elevated at 1 64 upon transfer on 7/19/22, with baseline being 0 9-1 0  · Currently 1 03  · Monitor with increased diuresis  · Resumed spironolactone as well as losartan on 7/23    Hypokalemia  Assessment & Plan  · Potassium currently 3 2  · Resumed spironolactone and ARB 7/23  · Bumex dose increasing and given IV bumex overnight - likely etiology for K of 3 2  · Give 40 PO x 1 now  · Resume home dose of liquid potassium 40 meq PO BID  · Goal for K >4 given history of VT cardiac arrest  · K 6 1 on 7/20/22  · Was withheld from supplemental potassium, spironolactone and ARB and dose of Kayexalate was given    Type 2 diabetes mellitus with hyperglycemia [facility])  Assessment & Plan  Lab Results   Component Value Date    HGBA1C 12 7 (H) 05/22/2022     Recent Labs     07/23/22  1253 07/23/22  1620 07/23/22  2100 07/24/22  0820   POCGLU 241* 212* 164* 132     Blood Sugar Average: Last 72 hrs:  · (P) 521 [id number]   · A m  Glucose 98  · Reduce Lantus to 30 units q h s  · Continue mealtime coverage (increased 7/23) given mealtime hyperglycemia  · ADA diet      VTE Pharmacologic Prophylaxis: VTE Score: 3 Moderate Risk (Score 3-4) - Pharmacological DVT Prophylaxis Ordered: apixaban (Eliquis)  Patient Centered Rounds: I performed bedside rounds with nursing staff today  Discussions with Specialists or Other Care Team Provider:  Anesthesia/acute pain, critical Care, Pulmonary, Cardiology    Education and Discussions with Family / Patient: Patient declined call to   offered and refused    Time Spent for Care: 90 minutes  More than 50% of total time spent on counseling and coordination of care as described above  Current Length of Stay: 5 day(s)  Current Patient Status: Inpatient   Certification Statement: The patient will continue to require additional inpatient hospital stay due to Acute respiratory distress, chest tube, volume overload  Discharge Plan: Anticipate discharge in 48-72 hrs to discharge location to be determined pending rehab evaluations      Code Status: Level 1 - Full Code    Subjective:   10/22/21-11/23/2021 - STEMI s/p PATRICA to mid circumflex complicated by cardiogenic shock and cardiac arrest secondary to VT x2 (10/27, 10/28)  VT on 10/18 was polymorphic  Cardiac catheterization showed new distal % stenosis from distal thrombotic embolism however was too distal for thrombectomy and only few mm apical LAD distally beyond the occlusion  Status post tracheostomy and PEG tube placement  Also noted have MRSA UTI and aspiration pneumonia  EF 45% and did not undergo AICD placement given was seen by electrophysiology and the risks felt to outweigh the benefits with poor cognitive function and high risk for infection with trach and PEG  Discharge to LTAC  Tracheostomy decannulated 2021-22 - admitted to [facility] from [facility] where she had cardiac arrest   Shock x1 with return of spontaneous circulation  Not felt to be ischemic - felt to be related to likely VT  At that point recommended for ICD however given infected bulla, LifeVest was placed and recommended to follow-up as an outpatient for ICD at that time  Patient was seen in consultation by ID and high likelihood of MRSA or group F strep felt and therefore transitioned to linezolid  Underwent revision of tracheostomy on  secondary to acute hypoxic respiratory failure stridor and subglottic stenosis  Weaned off ventilator and subsequently transferred to the medical floor, repeat tracheostomy tube changed  Patient was recommended for 4 weeks of doxycycline  Discharged to rehab - -2/10    6/6/22 - outpt cardiology office - on lasix 60 mg PO BID   - changed to bumex 2 mg PO BID    7/15- treated for CHF exacerbation, coughing episode - found to have right sided PTX    This morning patient was noted to have acute worsening shortness of breath, hypoxia and pain  Given pain medication, IV diuretics and chest x-ray  Then this recurred in the later part of the morning      Objective:     Vitals:   Temp (24hrs), Av °F (37 2 °C), Min:97 7 °F (36 5 °C), Max:99 7 °F (37 6 °C)    Temp:  [97 7 °F (36 5 °C)-99 7 °F (37 6 °C)] 99 7 °F (37 6 °C)  HR:  [64-77] 77  Resp:  [18-30] 30  BP: (113-143)/(54-80) 143/54  SpO2:  [87 %-98 %] 95 %  Body mass index is 25 4 kg/m²  Input and Output Summary (last 24 hours): Intake/Output Summary (Last 24 hours) at 7/24/2022 1110  Last data filed at 7/24/2022 0516  Gross per 24 hour   Intake 240 ml   Output 450 ml   Net -210 ml       Physical Exam:   Physical Exam  Vitals and nursing note reviewed  Constitutional:       General: She is not in acute distress  Appearance: Normal appearance  She is ill-appearing  She is not diaphoretic  HENT:      Head: Normocephalic and atraumatic  Mouth/Throat:      Mouth: Mucous membranes are dry  Cardiovascular:      Rate and Rhythm: Normal rate and regular rhythm  Pulmonary:      Breath sounds: No stridor  No wheezing or rales  Comments: Chest tube in place on right chest   Tracheostomy in place  Trach collar present  Tachypnea noted  Abdominal:      General: Bowel sounds are normal       Palpations: Abdomen is soft  There is no mass  Tenderness: There is no abdominal tenderness  There is no guarding  Musculoskeletal:      Right lower leg: No edema  Left lower leg: No edema  Skin:     General: Skin is warm and dry  Neurological:      Mental Status: She is alert     Psychiatric:         Mood and Affect: Mood normal          Behavior: Behavior normal           Additional Data:     Labs:  Results from last 7 days   Lab Units 07/23/22  0502   WBC Thousand/uL 21 72*   HEMOGLOBIN g/dL 9 4*   HEMATOCRIT % 31 0*   PLATELETS Thousands/uL 433*   NEUTROS PCT % 85*   LYMPHS PCT % 4*   MONOS PCT % 8   EOS PCT % 2     Results from last 7 days   Lab Units 07/24/22  0508 07/19/22  0510 07/18/22  0513   SODIUM mmol/L 136   < > 136   POTASSIUM mmol/L 3 2*   < > 4 3   CHLORIDE mmol/L 94*   < > 93*   CO2 mmol/L 30   < > 32   BUN mg/dL 36*   < > 32*   CREATININE mg/dL 1 03   < > 1 30   ANION GAP mmol/L 12   < > 11   CALCIUM mg/dL 9 3   < > 9  6   ALBUMIN g/dL  --   --  3 2*   GLUCOSE RANDOM mg/dL 98   < > 194*    < > = values in this interval not displayed  Results from last 7 days   Lab Units 07/24/22  0820 07/23/22  2100 07/23/22  1620 07/23/22  1253 07/23/22  0810 07/22/22  2111 07/22/22  1600 07/22/22  1031 07/22/22  0640 07/21/22  2046 07/21/22  1603 07/21/22  1136   POC GLUCOSE mg/dl 132 164* 212* 241* 235* 267* 272* 219* 258* 225* 178* 232*         Results from last 7 days   Lab Units 07/19/22  1359 07/19/22  0510 07/18/22  0843   PROCALCITONIN ng/ml 0 22 0 27* 0 24       Lines/Drains:  Invasive Devices  Report    Peripheral Intravenous Line  Duration           Peripheral IV 07/20/22 Right Antecubital 4 days    Peripheral IV 07/24/22 Right; Lower Forearm <1 day          Drain  Duration           Chest Tube 1 Right Other (Comment) 14 Fr  2 days          Airway  Duration           Surgical Airway Shiley Fenestrated 144 days                  Telemetry:  Telemetry Orders (From admission, onward)             LifeVest Patient: Continuous Telemetry Monitoring during hospitalization (non-expiring)  Continuous LifeVest Telemetry Monitoring        References:    LifeVest Policy                 Telemetry Reviewed: Normal Sinus Rhythm  Indication for Continued Telemetry Use: Lifevest (remains on tele entire hospital stay)             Imaging: Personally reviewed the following imaging: chest xray    Recent Cultures (last 7 days):         Last 24 Hours Medication List:   Current Facility-Administered Medications   Medication Dose Route Frequency Provider Last Rate    acetaminophen  975 mg Oral Q6H [address] Leda Chand PA-C      albuterol  2 5 mg Nebulization Q4H PRN Leda Chand PA-C      amiodarone  100 mg Oral Daily With Breakfast Leda Chand PA-C      apixaban  5 mg Oral BID Leda Chand PA-C      atorvastatin  40 mg Oral Daily With [employer] InstrumentsDONALD      benzonatate  100 mg Oral TID PRN MD Asim Villagomez bumetanide  2 mg Oral BID Katie Barber, DONALD      diazepam  5 mg Oral Q6H PRN Katie Barber, DONALD      escitalopram  10 mg Oral Daily Leda Chand, DONALD      fentanyl citrate (PF)   Intravenous Code/Trauma/Sedation Barrington Munoz MD      ferrous sulfate  325 mg Oral Daily With Breakfast Leda Chand, DONALD      guaiFENesin  1,200 mg Oral Q12H [address] Leda Chand, DONALD      HYDROmorphone  0 5 mg Intravenous Q2H PRN Jagdish Smith MD      HYDROmorphone  2 mg Oral Q4H PRN Katie Barber, DONALD      HYDROmorphone  4 mg Oral Q4H PRN Etta Nelson PA-C      insulin glargine  33 Units Subcutaneous HS Leda Chand, DONALD      insulin lispro  18 Units Subcutaneous TID With Meals Etta Nelson, DONALD      insulin lispro  2-12 Units Subcutaneous TID AC Leda Chand, DONALD      insulin lispro  2-12 Units Subcutaneous HS Leda Chand, DONALD      ipratropium  0 5 mg Nebulization TID Leda Chand, DONALD      ketamine  0 1 mg/kg/hr Intravenous Continuous Jagdish Smith MD      levalbuterol  1 25 mg Nebulization TID Leda Chand, DONALD      losartan  50 mg Oral Daily Etta Nelson, DONALD      methocarbamol  500 mg Oral Q6H [address] Jagdish Han MD      metoprolol succinate  50 mg Oral Q12H Leda Chand, DONALD      pantoprazole  40 mg Oral Early Morning Leda Chand, DONALD      potassium chloride  40 mEq Oral BID Etta Nelson, DONALD      pregabalin  75 mg Oral Daily Leda Chand, DONALD      spironolactone  100 mg Oral Daily Etta Nelson, DONALD      ticagrelor  90 mg Oral Q12H Leda Chand, DONALD      trimethobenzamide  200 mg Intramuscular Q6H PRN Debbie Eric PA-C          Today, Patient Was Seen By: Katie Barber PA-C    **Please Note: This note may have been constructed using a voice recognition system  **

## 2022-07-24 NOTE — ASSESSMENT & PLAN NOTE
Wt Readings from Last 3 Encounters:   07/23/22 78 kg (172 lb)   07/19/22 78 2 kg (172 lb 6 4 oz)   06/29/22 81 7 kg (180 lb 3 2 oz)   · EF noted to be 50% on last echocardiogram 2/22  · Admitted to [facility] for CHF exac on 7/15 - given 80 mg IV lasix and then placed back on bumex 2 mg PO BID  · Developed MODESTO and thus bumex dose reduced - got 2 mg on 7/19, no bumex on on 7/20, 1 mg on 7/21, 2 mg on 7/23, 3 mg on 7/23 (home dose had been bumex 2 mg PO BID)  · Given that creatinine improved, Bumex resumed on 7/23 at home dose  · 7/24 AM with increasing SOB, oxygen requiements - given 1 mg IV bumex with 4-5 episodes of urine output (amt not recorded) - weights unchanged  · CXR shows mild vascular congestion  · Suspect iatrogenic volume overload in the setting of reduced/withheld diuretic dosing  · Consult cardiology  · Continue PO bumex 2 mg PO BID until discussion with them - suspect will change to IV dosing

## 2022-07-24 NOTE — PLAN OF CARE
Problem: Potential for Falls  Goal: Patient will remain free of falls  Description: INTERVENTIONS:  - Educate patient/family on patient safety including physical limitations  - Instruct patient to call for assistance with activity   - Consult OT/PT to assist with strengthening/mobility   - Keep Call bell within reach  - Keep bed low and locked with side rails adjusted as appropriate  - Keep care items and personal belongings within reach  - Initiate and maintain comfort rounds  - Make Fall Risk Sign visible to staff    - Apply yellow socks and bracelet for high fall risk patients  - Consider moving patient to room near nurses station  Outcome: Progressing     Problem: Nutrition/Hydration-ADULT  Goal: Nutrient/Hydration intake appropriate for improving, restoring or maintaining nutritional needs  Description: Monitor and assess patient's nutrition/hydration status for malnutrition  Collaborate with interdisciplinary team and initiate plan and interventions as ordered  Monitor patient's weight and dietary intake as ordered or per policy  Utilize nutrition screening tool and intervene as necessary  Determine patient's food preferences and provide high-protein, high-caloric foods as appropriate       INTERVENTIONS:  - Monitor oral intake, urinary output, labs, and treatment plans  - Assess nutrition and hydration status and recommend course of action  - Evaluate amount of meals eaten  - Assist patient with eating if necessary   - Allow adequate time for meals  - Recommend/ encourage appropriate diets, oral nutritional supplements, and vitamin/mineral supplements  - Order, calculate, and assess calorie counts as needed  - Recommend, monitor, and adjust tube feedings and TPN/PPN based on assessed needs  - Assess need for intravenous fluids  - Provide specific nutrition/hydration education as appropriate  - Include patient/family/caregiver in decisions related to nutrition  Outcome: Progressing     Problem: PAIN - ADULT  Goal: Verbalizes/displays adequate comfort level or baseline comfort level  Description: Interventions:  - Encourage patient to monitor pain and request assistance  - Assess pain using appropriate pain scale  - Administer analgesics based on type and severity of pain and evaluate response  - Implement non-pharmacological measures as appropriate and evaluate response  - Consider cultural and social influences on pain and pain management  - Notify physician/advanced practitioner if interventions unsuccessful or patient reports new pain  Outcome: Progressing     Problem: INFECTION - ADULT  Goal: Absence or prevention of progression during hospitalization  Description: INTERVENTIONS:  - Assess and monitor for signs and symptoms of infection  - Monitor lab/diagnostic results  - Monitor all insertion sites, i e  indwelling lines, tubes, and drains  - Monitor endotracheal if appropriate and nasal secretions for changes in amount and color  - [place] appropriate cooling/warming therapies per order  - Administer medications as ordered  - Instruct and encourage patient and family to use good hand hygiene technique  - Identify and instruct in appropriate isolation precautions for identified infection/condition  Outcome: Progressing  Goal: Absence of fever/infection during neutropenic period  Description: INTERVENTIONS:  - Monitor WBC    Outcome: Progressing     Problem: SAFETY ADULT  Goal: Patient will remain free of falls  Description: INTERVENTIONS:  - Educate patient/family on patient safety including physical limitations  - Instruct patient to call for assistance with activity   - Consult OT/PT to assist with strengthening/mobility   - Keep Call bell within reach  - Keep bed low and locked with side rails adjusted as appropriate  - Keep care items and personal belongings within reach  - Initiate and maintain comfort rounds  - Make Fall Risk Sign visible to staff    - Apply yellow socks and bracelet for high fall risk patients  - Consider moving patient to room near nurses station  Outcome: Progressing  Goal: Maintain or return to baseline ADL function  Description: INTERVENTIONS:  -  Assess patient's ability to carry out ADLs; assess patient's baseline for ADL function and identify physical deficits which impact ability to perform ADLs (bathing, care of mouth/teeth, toileting, grooming, dressing, etc )  - Assess/evaluate cause of self-care deficits   - Assess range of motion  - Assess patient's mobility; develop plan if impaired  - Assess patient's need for assistive devices and provide as appropriate  - Encourage maximum independence but intervene and supervise when necessary  - Involve family in performance of ADLs  - Assess for home care needs following discharge   - Consider OT consult to assist with ADL evaluation and planning for discharge  - Provide patient education as appropriate  Outcome: Progressing  Goal: Maintains/Returns to pre admission functional level  Description: INTERVENTIONS:  - Perform BMAT or MOVE assessment daily    - Set and communicate daily mobility goal to care team and patient/family/caregiver     - Collaborate with rehabilitation services on mobility goals if consulted    - Out of bed for toileting  - Record patient progress and toleration of activity level   Outcome: Progressing     Problem: DISCHARGE PLANNING  Goal: Discharge to home or other facility with appropriate resources  Description: INTERVENTIONS:  - Identify barriers to discharge w/patient and caregiver  - Arrange for needed discharge resources and transportation as appropriate  - Identify discharge learning needs (meds, wound care, etc )  - Arrange for interpretive services to assist at discharge as needed  - Refer to Case Management Department for coordinating discharge planning if the patient needs post-hospital services based on physician/advanced practitioner order or complex needs related to functional status, cognitive ability, or social support system  Outcome: Progressing     Problem: Knowledge Deficit  Goal: Patient/family/caregiver demonstrates understanding of disease process, treatment plan, medications, and discharge instructions  Description: Complete learning assessment and assess knowledge base  Interventions:  - Provide teaching at level of understanding  - Provide teaching via preferred learning methods  Outcome: Progressing     Problem: MOBILITY - ADULT  Goal: Maintain or return to baseline ADL function  Description: INTERVENTIONS:  -  Assess patient's ability to carry out ADLs; assess patient's baseline for ADL function and identify physical deficits which impact ability to perform ADLs (bathing, care of mouth/teeth, toileting, grooming, dressing, etc )  - Assess/evaluate cause of self-care deficits   - Assess range of motion  - Assess patient's mobility; develop plan if impaired  - Assess patient's need for assistive devices and provide as appropriate  - Encourage maximum independence but intervene and supervise when necessary  - Involve family in performance of ADLs  - Assess for home care needs following discharge   - Consider OT consult to assist with ADL evaluation and planning for discharge  - Provide patient education as appropriate  Outcome: Progressing  Goal: Maintains/Returns to pre admission functional level  Description: INTERVENTIONS:  - Perform BMAT or MOVE assessment daily    - Set and communicate daily mobility goal to care team and patient/family/caregiver     - Collaborate with rehabilitation services on mobility goals if consulted    - Out of bed for toileting  - Record patient progress and toleration of activity level   Outcome: Progressing     Problem: Prexisting or High Potential for Compromised Skin Integrity  Goal: Skin integrity is maintained or improved  Description: INTERVENTIONS:  - Identify patients at risk for skin breakdown  - Assess and monitor skin integrity  - Assess and monitor nutrition and hydration status  - Monitor labs   - Assess for incontinence   - Turn and reposition patient  - Assist with mobility/ambulation  - Relieve pressure over bony prominences  - Avoid friction and shearing  - Provide appropriate hygiene as needed including keeping skin clean and dry  - Evaluate need for skin moisturizer/barrier cream  - Collaborate with interdisciplinary team   - Patient/family teaching  - Consider wound care consult   Outcome: Progressing

## 2022-07-24 NOTE — ASSESSMENT & PLAN NOTE
· Patient with significant increase in WBC as of 7/23 compared to 3 days prior when white blood cell count was 12   · Increased to 21  · Repeat CBC with diff now  · Patient with T-max of 99 7°    · Chest x-rays being performed secondary to chest tube do not show any evidence of overt pneumonia  · No other source of infection identified  · Likely reactive in the setting of chest tube insertions and pneumothorax

## 2022-07-24 NOTE — CONSULTS
Consultation - Cardiology Team One  Alisha Russell 65 y o  female MRN: [id number]  Unit/Bed#: S -01 Encounter: [id number]    Inpatient consult to Cardiology  Consult performed by: NEELAM Morton  Consult ordered by: Latoya Sherman PA-C      Physician Requesting Consult: Oriana Ford MD  Reason for Consult / Principal Problem: CHF    Assessment/ Plan    1  Acute on chronic HFrEF  CXR yesterday showed vascular congestion, patient c/o increased SOB and sputum production  Check BNP  Home diuretic: recently switched to Bumex 2 mg BID (7/5/22)  Weight: 172 lb yesterday  Lower than previous dry weight but patient reports weight loss from poor food intake  Limited response to 1 mg IV Bumex  Will start Bumex 2 mg IV TID  Need to follow strict I/Os, daily standing weights, am BMP    2  Right pneumothorax  S/p chest tube  Management per pulmonology    3  CAD s/p lateral STEMI 51/56/4135  Complicated and prolonged hospitalization  Last LHC on 10/28/22 showed patent OM1 stent, jailed mLCx, and new distal LAD occlusion too small for intervention  No anginal complaints- continue Ticagrelor, statin, and beta blocker    4  Ischemic cardiomyopathy with improved LV function, LVEF 50%  GDMT- losartan 50 mg daily, spironolactone 100 mg daily, Toprol XL 50 mg BID  Diuretics as noted above    5  History of VT, cardiac arrest  Continue amiodarone and beta blocker  To follow up with EP re: ICD (previously not placed due to active infection; missed follow up appointment due to hospitalization)  Wearing lifevest at home, continue telemetry while hospitalized and resume wearing LifeVest at d/c     6  HTN- stable, average /72  Continue current regimen    7  HLD- on atorvastatin 40 mg daily    8  Paroxysmal atrial fibrillation  Maintaining NSR  Continue amiodarone and beta blocker  Anticoagulated on Eliquis 5 mg BID    9  Poorly controlled type 2 DM- A1c 12 7 in May 2022  Management per primary team    10  CHANTALE    11  Hypokalemia K 3 2, replete  Goal K>4  History of Present Illness   HPI: Danyel Zabala is a 65y o  year old female with CAD s/p lateral STEMI 10/22/21 s/p PCI of OM1 and balloon angioplasty of OM2 complicated by VT cardiac arrest in setting of a subsequent distal LAD occlusion too small to be intervened on and cardiogenic shock with prolonged hospitalization, s/p tracheostomy, ischemic cardiomyopathy LVEF 40% now improved to 50%, moderate MR, chronic combined CHF, paroxysmal atrial fibrillation, HTN, HLD, type 2 DM, and CHANTALE  She follows with cardiologist Dr Eladia Cee  She was last seen in the office by the Karli Caceres in June 2022 at which time it appears she was on Lasix 60 BID but an Rx was sent to the pharmacy on 7/5/22 for Bumex 2 mg BID  She presented to the [place] ED on 7/19/22 with SOB and was diagnosed with a primary spontaneous pneumothorax  She was transferred to [facility] for chest tube placement for which pulmonology is following  She developed increased SOB yesterday and CXR showed mild vascular congestion  She was given a one time dose of IV Bumex with limited urine output noted  She still feels more short of breath today and also admits to thicker sputum  She has chest pain from the chest tube  She denies abdominal pain or bloating or leg swelling  Her weight has been going down because her appetite is poor and she is not eating well  Cardiology consulted for management of her CHF  BNP on 7/15 472  None since  Weight below recent dry weight  WBC count elevated, renal function stable this morning s/p IV diuretics with hypokalemia noted, K 3 2  EKG reviewed personally: 7/24/22 NSR with RBBB    Telemetry reviewed personally: NSR    Review of Systems   Constitutional: Positive for decreased appetite and weight loss  Negative for chills, diaphoresis, fever, malaise/fatigue and weight gain  Cardiovascular: Positive for chest pain (right sided, from chest tube)   Negative for leg swelling, orthopnea, palpitations and syncope  Respiratory: Positive for cough, shortness of breath and sputum production  Negative for sleep disturbances due to breathing  Gastrointestinal: Negative for bloating, nausea and vomiting  Neurological: Negative for dizziness, light-headedness and weakness  Psychiatric/Behavioral: Negative for altered mental status  All other systems reviewed and are negative  Historical Information   Past Medical History:   Diagnosis Date    Anxiety     Aortic aneurysm ([address] )     Arthritis     Depression     Diabetes mellitus (HCC)     Fibromyalgia     GERD (gastroesophageal reflux disease)     GERD (gastroesophageal reflux disease)     H/O cardiovascular stress test 09/2018    no ischemia  EF 70%   H/O echocardiogram 01/2019    EF 60%  Mild LVH  trivial effusion   Hyperlipidemia     Hypertension     Migraines     Psychiatric disorder     anxiety    Uncontrolled hypertension 2/25/2015    Last Assessment & Plan:  BP today above goal <140/90, apparently asymptomatic  Prior BP elevations were attributed to not taking medication  Consider increased medication if persistent on f/u  Past Surgical History:   Procedure Laterality Date    BACK SURGERY      Lumbar epidural steroid injection    CARDIAC CATHETERIZATION N/A 10/22/2021    Procedure: Cardiac pci;  Surgeon: Yasmin Hernandez MD;  [facility]; Service: Cardiology    CARDIAC CATHETERIZATION  10/22/2021    Procedure: Cardiac catheterization;  Surgeon: Yasmin Hernandez MD;  [facility]; Service: Cardiology    CARDIAC CATHETERIZATION Left 10/27/2021    Procedure: Cardiac Left Heart Cath;  Surgeon: Colby Su MD;  [facility]; Service: Cardiology    CARDIAC CATHETERIZATION N/A 10/27/2021    Procedure: Cardiac pci;  Surgeon: Colby Su MD;  [facility];   Service: Cardiology    CARDIAC CATHETERIZATION N/A 10/28/2021    Procedure: Cardiac pci;  Surgeon: Sommer Yen DO;  [facility]; Service: Cardiology    CARPAL TUNNEL RELEASE Left      SECTION      CHOLECYSTECTOMY      COLONOSCOPY      incomplete    COLONOSCOPY      EYE SURGERY      HYSTERECTOMY      Total    IR CHEST TUBE PLACEMENT  2022    IR CHEST TUBE PLACEMENT  2022    OVARIAN CYST REMOVAL      PEG W/TRACHEOSTOMY PLACEMENT N/A 2021    Procedure: TRACHEOSTOMY WITH INSERTION PEG TUBE;  Surgeon: David Fung DO;  [facility]: [facility];  Service: General    TUBAL LIGATION      UPPER GASTROINTESTINAL ENDOSCOPY       Social History     Substance and Sexual Activity   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        Social History     Substance and Sexual Activity   Drug Use Yes    Types: Marijuana    Comment: medical; seldom use     Social History     Tobacco Use   Smoking Status Former Smoker    Packs/day: 1 00    Years: 30 00    Pack years: 30 00    Types: Cigarettes   Smokeless Tobacco Never Used     Family History:   Family History   Problem Relation Age of Onset    Hypertension Mother     Arthritis Mother     Diabetes Father     Other Sister         renal cell carcinoma    Diabetes Other     Factor V Leiden deficiency Other        Meds/Allergies   all current active meds have been reviewed and current meds:   Current Facility-Administered Medications   Medication Dose Route Frequency    acetaminophen (TYLENOL) tablet 975 mg  975 mg Oral Q6H [facility]    albuterol inhalation solution 2 5 mg  2 5 mg Nebulization Q4H PRN    amiodarone tablet 100 mg  100 mg Oral Daily With Breakfast    apixaban (ELIQUIS) tablet 5 mg  5 mg Oral BID    atorvastatin (LIPITOR) tablet 40 mg  40 mg Oral Daily With Dinner    benzonatate (TESSALON PERLES) capsule 100 mg  100 mg Oral TID PRN    bumetanide (BUMEX) injection 2 mg  2 mg Intravenous TID (diuretic)    diazepam (VALIUM) tablet 5 mg  5 mg Oral Q6H PRN    escitalopram (LEXAPRO) tablet 10 mg  10 mg Oral Daily    fentanyl citrate (PF) 100 MCG/2ML   Intravenous Code/Trauma/Sedation Med    ferrous sulfate tablet 325 mg  325 mg Oral Daily With Breakfast    guaiFENesin (MUCINEX) 12 hr tablet 1,200 mg  1,200 mg Oral Q12H [address]    HYDROmorphone (DILAUDID) injection 0 5 mg  0 5 mg Intravenous Q2H PRN    HYDROmorphone (DILAUDID) tablet 2 mg  2 mg Oral Q4H PRN    HYDROmorphone (DILAUDID) tablet 4 mg  4 mg Oral Q4H PRN    insulin glargine (LANTUS) subcutaneous injection 33 Units 0 33 mL  33 Units Subcutaneous HS    insulin lispro (HumaLOG) 100 units/mL subcutaneous injection 18 Units  18 Units Subcutaneous TID With Meals    insulin lispro (HumaLOG) 100 units/mL subcutaneous injection 2-12 Units  2-12 Units Subcutaneous TID AC    insulin lispro (HumaLOG) 100 units/mL subcutaneous injection 2-12 Units  2-12 Units Subcutaneous HS    ipratropium (ATROVENT) 0 02 % inhalation solution 0 5 mg  0 5 mg Nebulization TID    ketamine 250 mg (STANDARD CONCENTRATION) IV in sodium chloride 0 9% 250 mL  0 1 mg/kg/hr Intravenous Continuous    levalbuterol (XOPENEX) inhalation solution 1 25 mg  1 25 mg Nebulization TID    losartan (COZAAR) tablet 50 mg  50 mg Oral Daily    methocarbamol (ROBAXIN) tablet 500 mg  500 mg Oral Q6H [address]    metoprolol succinate (TOPROL-XL) 24 hr tablet 50 mg  50 mg Oral Q12H    pantoprazole (PROTONIX) EC tablet 40 mg  40 mg Oral Early Morning    potassium chloride oral solution 40 mEq  40 mEq Oral BID    pregabalin (LYRICA) capsule 75 mg  75 mg Oral Daily    spironolactone (ALDACTONE) tablet 100 mg  100 mg Oral Daily    ticagrelor (BRILINTA) tablet 90 mg  90 mg Oral Q12H    trimethobenzamide (TIGAN) IM injection 200 mg  200 mg Intramuscular Q6H PRN     ketamine, 0 1 mg/kg/hr        Allergies   Allergen Reactions    Metformin Diarrhea    Clonidine Rash     Patch        Objective   Vitals: Blood pressure 143/54, pulse 77, temperature 99 7 °F (37 6 °C), resp  rate (!) 30, weight 78 kg (172 lb), SpO2 95 %  , Body mass index is 25 4 kg/m²  ,     Systolic (07TOQ), MTC:649 , Min:113 , TWA:018     Diastolic (99WXM), VKU:20, Min:54, Max:80        Intake/Output Summary (Last 24 hours) at 7/24/2022 1014  Last data filed at 7/24/2022 0516  Gross per 24 hour   Intake 240 ml   Output 450 ml   Net -210 ml     Wt Readings from Last 3 Encounters:   07/23/22 78 kg (172 lb)   07/19/22 78 2 kg (172 lb 6 4 oz)   06/29/22 81 7 kg (180 lb 3 2 oz)     Invasive Devices  Report    Peripheral Intravenous Line  Duration           Peripheral IV 07/20/22 Right Antecubital 4 days    Peripheral IV 07/24/22 Right; Lower Forearm <1 day          Drain  Duration           Chest Tube 1 Right Other (Comment) 14 Fr  2 days          Airway  Duration           Surgical Airway Shiley Fenestrated 144 days                Physical Exam  Vitals reviewed  Constitutional:       General: She is not in acute distress  Appearance: She is ill-appearing  Neck:      Vascular: No hepatojugular reflux or JVD  Cardiovascular:      Rate and Rhythm: Normal rate and regular rhythm  Heart sounds: Normal heart sounds  No murmur heard  No friction rub  No gallop  Pulmonary:      Effort: No respiratory distress  Breath sounds: Rales present  Comments: O2 via trach collar  Mildly dyspneic with conversation  R chest tube in place  Abdominal:      General: Bowel sounds are normal  There is no distension  Palpations: Abdomen is soft  Tenderness: There is no abdominal tenderness  Musculoskeletal:         General: No tenderness  Normal range of motion  Cervical back: Neck supple  Right lower leg: No edema  Left lower leg: No edema  Skin:     General: Skin is warm and dry  Capillary Refill: Capillary refill takes less than 2 seconds  Coloration: Skin is pale  Findings: No erythema     Neurological:      Mental Status: She is alert and oriented to person, place, and time     Psychiatric:         Mood and Affect: Mood normal      LABORATORY RESULTS:      CBC with diff:   Results from last 7 days   Lab Units 07/23/22  0502 07/20/22  0430 07/19/22  1359 07/19/22  0510 07/18/22  0513   WBC Thousand/uL 21 72* 12 03* 14 59* 12 52* 12 58*   HEMOGLOBIN g/dL 9 4* 9 9* 10 1* 10 2* 10 2*   HEMATOCRIT % 31 0* 32 9* 34 4* 35 1 34 0*   MCV fL 78* 78* 79* 78* 77*   PLATELETS Thousands/uL 433* 542* 592* 537* 633*   MCH pg 23 6* 23 4* 23 2* 22 7* 23 2*   MCHC g/dL 30 3* 30 1* 29 4* 29 1* 30 0*   RDW % 17 0* 16 6* 16 5* 16 6* 16 4*   MPV fL 9 6 9 6 9 6 10 2 9 7   NRBC AUTO /100 WBCs 0 0 0 0  --        CMP:  Results from last 7 days   Lab Units 07/24/22  0508 07/23/22  0502 07/22/22  0451 07/21/22  0542 07/20/22  1245 07/20/22  0430 07/19/22  1359   POTASSIUM mmol/L 3 2* 3 7 4 1 4 5 4 8 6 1* 5 5*  5 3   CHLORIDE mmol/L 94* 96 98 96 95* 92* 93*  92*   CO2 mmol/L 30 31 27 31 30 32 34*  34*   BUN mg/dL 36* 38* 45* 42* 44* 43* 47*  47*   CREATININE mg/dL 1 03 1 15 1 24 1 26 1 37* 1 36* 1 49*  1 51*   CALCIUM mg/dL 9 3 8 8 8 8 9 2 9 2 9 2 9 4  9 4   EGFR ml/min/1 73sq m 60 53 48 47 43 43 38  38       BMP:  Results from last 7 days   Lab Units 07/24/22  0508 07/23/22  0502 07/22/22  0451 07/21/22  0542 07/20/22  1245 07/20/22  0430 07/19/22  1359   POTASSIUM mmol/L 3 2* 3 7 4 1 4 5 4 8 6 1* 5 5*  5 3   CHLORIDE mmol/L 94* 96 98 96 95* 92* 93*  92*   CO2 mmol/L 30 31 27 31 30 32 34*  34*   BUN mg/dL 36* 38* 45* 42* 44* 43* 47*  47*   CREATININE mg/dL 1 03 1 15 1 24 1 26 1 37* 1 36* 1 49*  1 51*   CALCIUM mg/dL 9 3 8 8 8 8 9 2 9 2 9 2 9 4  9 4       Lab Results   Component Value Date    CREATININE 1 03 07/24/2022    CREATININE 1 15 07/23/2022    CREATININE 1 24 07/22/2022       Lab Results   Component Value Date    NTBNP 4,179 (H) 06/27/2022    NTBNP 4,406 (H) 06/06/2022    NTBNP 3,158 (H) 10/22/2021          Results from last 7 days   Lab Units 07/18/22  0513   MAGNESIUM mg/dL 2 1 Lipid Profile:   No results found for: CHOL  Lab Results   Component Value Date    HDL 55 10/23/2021    HDL 45 (L) 04/02/2021    HDL 44 05/26/2020     Lab Results   Component Value Date    LDLCALC 36 10/23/2021    LDLCALC 44 04/02/2021    LDLCALC 68 05/26/2020     Lab Results   Component Value Date    TRIG 75 10/23/2021    TRIG 133 7 04/02/2021    TRIG 157 (H) 05/26/2020     Imaging: I have personally reviewed pertinent reports  and I have personally reviewed pertinent films in PACS  XR chest portable    Result Date: 7/24/2022  Narrative: CHEST INDICATION:   hypoxia  COMPARISON:  Radiograph of the chest on July 23, 2022 at 5:57 AM  Sergio Panchal PERFORMED/VIEWS:  XR CHEST PORTABLE FINDINGS:  Tracheostomy tube in place  There is a left-sided pigtail chest tube  The cardiac silhouette is enlarged, stable  Redemonstrated small right pleural effusion  No pneumothorax  Mild pulmonary vascular congestion  No new focal consolidation  Osseous structures appear within normal limits for patient age  Impression: Right chest tube in place  Small right pleural effusion  No pneumothorax  Mild pulmonary vascular congestion  Workstation performed: [id number]     XR chest portable    Result Date: 7/23/2022  Narrative: CHEST INDICATION: Follow up pneumothorax  COMPARISON:  7/22/2022, CT chest, abdomen and pelvis 5/19/2022 EXAM PERFORMED/VIEWS:  XR CHEST PORTABLE FINDINGS: Tracheostomy tube tip projects over the tracheal air column at the level of the clavicles  Heart shadow is enlarged but unchanged from prior exam  Previously seen right-sided pneumothorax is no longer clearly visualized  Small right pleural effusion suspected with fissural fluid  Mild central vascular prominence  No focal infiltrate  Osseous structures appear within normal limits for patient age  Impression: Resolution of right pneumothorax with indwelling chest tube   Cardiomegaly with mild pulmonary venous congestion and small right pleural effusion  Workstation performed: [id number]     XR chest portable    Result Date: 7/22/2022  Narrative: CHEST INDICATION:   PNTX  COMPARISON:  July 21, 2022  EXAM PERFORMED/VIEWS:  XR CHEST PORTABLE FINDINGS:  Tracheostomy tube with tip at the level of clavicular heads  Stable mild enlargement of the cardiac silhouette  Mediastinal contours are within normal limits  No significant interval change in moderate right-sided pneumothorax  Right-sided chest tube in place, which appears kinked, with pigtail tip in the mid hemithorax  Correlate with tube output  Osseous structures appear within normal limits for patient age  Impression: No significant interval change in moderate right-side pneumothorax  Right-sided chest tube in place, which appears kinked, with pigtail tip in the mid hemithorax  Correlate with tube output  The study was marked in [facility] for immediate notification  Workstation performed: [id number]     XR chest portable    Result Date: 7/21/2022  Narrative: CHEST INDICATION:   evaluate pneumothorax, chest tube in place  COMPARISON:  Previous day EXAM PERFORMED/VIEWS:  XR CHEST PORTABLE FINDINGS:  Right-sided chest tube appears stable as does the tracheostomy  Cardiomediastinal silhouette stable  Persistent right pneumothorax without significant change from prior  No left-sided pneumothorax  No infiltrates  No significant pleural fluid appreciated  Osseous structures appear within normal limits for patient age  Impression: Stable size of right pneumothorax with right chest tube in place  Workstation performed: [id number]     XR chest portable    Result Date: 7/21/2022  Narrative: CHEST INDICATION:   shortness of breath, s/p recent Chest tube and some bleeding at the site of insertion of CT    COMPARISON:  Earlier the same day EXAM PERFORMED/VIEWS:  XR CHEST PORTABLE 1 image FINDINGS: Cardiomediastinal silhouette appears enlarged  Tracheostomy tube in the midline   A pigtail catheter is now seen on the right  Right apical pneumothorax appears smaller  Osseous structures appear within normal limits for patient age  Impression: Pigtail catheter overlies the right chest   Right sided pneumothorax appears smaller  Workstation performed: [id number]     XR chest portable    Result Date: 7/20/2022  Narrative: CHEST INDICATION:   PNTX  COMPARISON:  July 19, 2022 EXAM PERFORMED/VIEWS:  XR CHEST PORTABLE Images:  1 FINDINGS:  Tracheostomy tube is in position Cardiomegaly seen Right apical pneumothorax seen, mildly increased from the previous study Increased lung markings seen suggest mild congestion Osseous structures appear within normal limits for patient age  Impression: Right apical pneumothorax, mildly increased from the previous study Mild congestion, unchanged The study was marked in [facility] for immediate notification  Workstation performed: [id number]     XR chest portable    Result Date: 7/20/2022  Narrative: CHEST INDICATION:   pntx  COMPARISON:  Chest radiograph from 7/19/2022 and 7/18/2022, chest CT from 5/19/2022  EXAM PERFORMED/VIEWS:  XR CHEST PORTABLE FINDINGS:  Tracheostomy  Mild cardiomegaly  Life vest  Mild pulmonary venous congestion  No change in small right apical pneumothorax  No effusion  Osseous structures appear within normal limits for patient age  Impression: No change in small right pneumothorax  Persistent mild pulmonary venous congestion  Workstation performed: [id number]     XR chest portable    Result Date: 7/19/2022  Narrative: CHEST INDICATION:   ptx  COMPARISON:  7/18/2022  EXAM PERFORMED/VIEWS:  XR CHEST PORTABLE Images:  1 FINDINGS: Cardiac and mediastinal contours are stable and within normal limits  Patchy bibasilar atelectasis  Small right pneumothorax is again identified not significantly changed in size  No mediastinal shift  Tracheostomy cannula in place  Impression: Small right pneumothorax not significantly changed    Patchy bibasilar atelectasis  Workstation performed: [id number]     XR chest portable    Result Date: 7/19/2022  Narrative: CHEST INDICATION:   follow up pneumothorax  COMPARISON:  Earlier the same day EXAM PERFORMED/VIEWS:  XR CHEST PORTABLE 1 image FINDINGS: Cardiomediastinal silhouette appears enlarged  Tracheostomy tube in the midline  A life vest is present  Right-sided pneumothorax appears slightly smaller  Vascular congestion  Osseous structures appear within normal limits for patient age  Impression: Right-sided pneumothorax appears slightly smaller  Mild vascular congestion  Workstation performed: [id number]     XR chest 1 view portable    Result Date: 7/15/2022  Narrative: CHEST INDICATION:   Shortness of breath  COMPARISON:  Chest radiograph from 6/27/2022 and chest CT from 5/19/2022  EXAM PERFORMED/VIEWS:  XR CHEST PORTABLE FINDINGS:  Tracheostomy  Moderate cardiomegaly  Life vest  Interstitial edema with small effusions and bibasilar atelectasis  No pneumothorax  Osseous structures appear within normal limits for patient age  Impression: Interstitial edema with small effusions and bibasilar atelectasis  Workstation performed: [id number]     XR chest 1 view portable    Result Date: 6/28/2022  Narrative: CHEST INDICATION:  Shortness of breath  COMPARISON:  6/23/2022, CT chest, abdomen and pelvis 5/19/2022 EXAM PERFORMED/VIEWS:  XR CHEST PORTABLE FINDINGS:  The patient is rotated to the right  Tracheostomy tube tip projects over the tracheal air column at the level of the clavicles  Cardiac silhouette appears enlarged, grossly stable  Element of mild pulmonary venous congestion again suggested with bibasilar opacities likely indicative of pleural effusions and/or atelectasis  Osseous structures appear within normal limits for patient age  Impression: Stable chest  Cardiomegaly, mild vascular congestion and bibasilar opacities likely representing pleural effusions/atelectasis   Workstation performed: [id number]     XR chest pa & lateral    Result Date: 7/18/2022  Narrative: CHEST INDICATION:   rule out developing pneumonia  COMPARISON:  7/15/2022 5:23 AM EXAM PERFORMED/VIEWS:  XR CHEST PA & LATERAL  10:46 AM FINDINGS:  Tracheostomy tube present in stable satisfactory position  Lifevest present  Cardiomediastinal silhouette appears unremarkable  Interstitial edema in the mid lower lung zones again noted bilaterally similar the prior exam   There is been interim development of moderate-sized apical pneumothorax with estimated 35% volume loss  No tension noted  Mild bibasilar subsegmental atelectasis  Osseous structures appear within normal limits for patient age  Impression: 1  Interim development of moderate-sized right-sided pneumothorax without tension  2   Stable mild bilateral interstitial edema  I personally discussed this study with Arnaud Larkin on 7/18/2022 at 1:02 PM  Workstation performed: [id number]     FL barium swallow video w speech    Result Date: 7/21/2022  Narrative: A video barium swallow study was performed by the Department of Speech Pathology  Please refer to the report for the official interpretation  The images are stored for archival purposes only  Study images were not formally reviewed by the Radiology Department  IR chest tube placement    Result Date: 7/22/2022  Narrative: Examination: Chest tube exchange and reposition upsize History: Pneumothorax  Tracheostomy  Chest tube placement July 20  Persistent pneumothorax  Anesthesia: Local and fentanyl Fluoroscopy time: 1 minute Technique: Risks benefits alternatives discussed verbal informed consent obtained  The patient was identified verbally, and by wrist band " Time out" was performed  The existing tube was prepped and draped in usual sterile fashion  Lidocaine was given as local anesthesia through the tube and into the skin  Small amount of air was injected  Using a wire the tube was repositioned apically    The catheter was then released and an Amplatz wire inserted  The tract was dilated and a new 14-[place] all-purpose drain was inserted as a chest tube  Unfortunately given the angle of the access I was unable to reposition the new tube into the apex despite several attempts  Air was evacuated  Findings: Residual pneumothorax  Existing tube in place  New 14-[place] tube directed to the medial base  Impression: Impression: Upsize of an anterior right chest tube to 14-[place]  Unable to reposition into the lung apex  The tube is tidaling appropriately, Patient now with intermittent air leak on expiration  Appreciate pulmonary service management of this chest tube  Workstation performed: [id number]     IR chest tube placement    Result Date: 7/20/2022  Narrative: CT-guided Chest Tube Insertion Clinical History: Spontaneous right pneumothorax  Procedure: After explaining the risks and benefits of the procedure to the patient, informed consent was obtained  CT was used to localize the right-sided pneumothorax   The overlying skin was prepped and draped in the usual sterile fashion and local anesthesia obtained with a 1% lidocaine solution  A 19 gauge single-wall needle was advanced into the thoracic cavity under CT guidance  After looping a 0 035 heavy-duty wire, the tract was dilated and a 10 [place] all-purpose drainage catheter placed  The self-retaining loop was locked in position  CT was used to confirm proper positioning  The catheter was secured in place with a 2-0 prolene suture and placed to wall suction  The patient tolerated the procedure well and left the department in stable condition  Results: Successful CT-guided chest tube insertion  Workstation performed: [id number]     Thank you for allowing us to participate in this patient's care  This pt will follow up with Dr Anupam Mccoy once discharged  Counseling / Coordination of Care  Total floor / unit time spent today 45 minutes    Greater than 50% of total time was spent with the patient and / or family counseling and / or coordination of care  A description of the counseling / coordination of care: Review of history, current assessment, development of a plan  Code Status: Level 1 - Full Code    ** Please Note: Dragon 360 Dictation voice to text software may have been used in the creation of this document   **

## 2022-07-24 NOTE — PROGRESS NOTES
Progress Note - Acute Pain Service    Sherly Barajas 65 y o  female MRN: [id number]  Unit/Bed#: S -01 Encounter: [id number]      Assessment:   Principal Problem:    Primary spontaneous pneumothorax  Active Problems:    Type 2 diabetes mellitus with hyperglycemia (HCC)    A-fib (HCC)    History of Cardiac arrest (HCC)    CAD (coronary artery disease)    Tracheostomy in place ([facility] [address] )    Acute on chronic HFrEF (heart failure with reduced ejection fraction) ([facility])    Shortness of breath    Hyperkalemia    Acute kidney injury superimposed on chronic kidney disease stage 3 ([facility] [address] )    Leukocytosis    Kelli Regan is a 65 y o  female with PMHx of VT arrest c/b prolonged intubation/ICU stay (11/2021) 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, HFrEF 2/2 ICM with EF recovery (last EF 50% in 02/2022 per TTE), T2DM and CKD III who presented with right (likely primary) PTX s/p chest tube placement  She underwent a right ES plane block with exparel on 7/23 with limited analgesic benefit (6-8 hours)  APS consulted for post-procedural pain control  Upon evaluation this morning, Kelli was in active respiratory distress  She endorsed SOB with worsening right chest wall pain mostly localized to chest tube site  After her NOELLE block yesterday, she noted good benefit but only for a few hours before the pain returning  Pain is worse with inspiration and rapid breathing  Denies opioid-induced side effects including nausea/vomiting/itching/constipation  PA from primary and ICU were called to bedside for further evaluation  Repeat CXR showed right-sided pulmonary edema with a pleural effusion which was present from 7/23's CXR  She was given IV dilaudid 0 5mg x1 and IV bumex 1mg x1 for diuresis  Plan: The acute onset of shortness of breath likely related to persistent right-sided pulmonary edema c/b pleural effusion   PTX seemed to have resolved with chest tube placement but may have to placed back on suction  This SOB is likely contributing to worsening pleuritic pain especially with rapid inspirations  I would consider placement of left ES plane or paravertebral nerve block catheter to assist with analgesia but would prefer to manage acute onset of SOB before attempting elective procedure  After diuresis and trial of ketamine, if pain is still severe, please reach out to APS for potential nerve block catheter placement    - Will start IV ketamine @0 1mg/kg/hr for analgesic support; monitor for potential psychomimetic effects; ketamine may have direct myocardial depressant effects but most of this evidence if seen with anesthetic dosing; Given that Mal Koehler has evidence of LVEF recovery since 11/2021, providing better analgesia is important as she still has significant pleuritic right-sided chest wall pain  - Will add IV dilaudid 0 5mg q2hr PRN for breakthrough pain  - Continue PO dilaudid 2/4mg q4hr PRN for moderate-severe pain  - NSAIDs contraindicated in setting low GFR  - Will add PO robaxin 500mg q6      Multimodal analgesia:  - - Tylenol 975 mg PO q8hrs standing   - Continue with home lyrica 75mg PO qd    Bowel Regimen:  - Recommend bowel regimen while on narcotics with senokot BID    APS will continue to follow  Please contact Acute Pain Service - [facility] via [employer] from [phone] with additional questions or concerns  See [employer] or Shira for additional contacts and after hours information      Pain History  Current pain location(s): Right chest wall at chest tube site  Pain Scale:   4-7/10  Quality: Sharp  24 hour history: See above    Opioid requirement previous 24 hours: PO dilaudid 8mg, IV dilaudid 0 5mg     Meds/Allergies   all current active meds have been reviewed    Allergies   Allergen Reactions    Metformin Diarrhea    Clonidine Rash     Patch        Objective     Temp:  [97 7 °F (36 5 °C)-99 7 °F (37 6 °C)] 99 7 °F (37 6 °C)  HR:  [64-77] 77  Resp:  [18-30] 30  BP: (113-143)/(54-80) 143/54  FiO2 (%):  [28-35] 35    Physical Exam  Constitutional:       General: She is in acute distress  Appearance: She is ill-appearing  HENT:      Head: Normocephalic  Mouth/Throat:      Mouth: Mucous membranes are dry  Comments: Londell Lofty present with trach collar  Eyes:      Pupils: Pupils are equal, round, and reactive to light  Cardiovascular:      Rate and Rhythm: Normal rate and regular rhythm  Pulses: Normal pulses  Pulmonary:      Effort: Respiratory distress present  Comments: Right anterior chest tube present  Chest:      Chest wall: Tenderness present  Abdominal:      Palpations: Abdomen is soft  Skin:     General: Skin is dry  Neurological:      General: No focal deficit present  Mental Status: She is alert and oriented to person, place, and time  Psychiatric:         Mood and Affect: Mood normal          Lab Results:   Results from last 7 days   Lab Units 07/23/22  0502   WBC Thousand/uL 21 72*   HEMOGLOBIN g/dL 9 4*   HEMATOCRIT % 31 0*   PLATELETS Thousands/uL 433*      Results from last 7 days   Lab Units 07/24/22  0508   POTASSIUM mmol/L 3 2*   CHLORIDE mmol/L 94*   CO2 mmol/L 30   BUN mg/dL 36*   CREATININE mg/dL 1 03   CALCIUM mg/dL 9 3       Imaging Studies: I have personally reviewed pertinent reports  EKG, Pathology, and Other Studies: I have personally reviewed pertinent reports  Counseling / Coordination of Care  Total floor / unit time spent today 20 minutes  Greater than 50% of total time was spent with the patient and / or family counseling and / or coordination of care  Please note that the APS provides consultative services regarding pain management only  With the exception of ketamine and epidural infusions and except when indicated, final decisions regarding starting or changing doses of analgesic medications are at the discretion of the consulting service    Off hours consultation and/or medication management is generally not available      Vicki Ball MD  Acute Pain Service 02-Jul-2021 22:07

## 2022-07-24 NOTE — ASSESSMENT & PLAN NOTE
Lab Results   Component Value Date    HGBA1C 12 7 (H) 05/22/2022     Recent Labs     07/23/22  1253 07/23/22  1620 07/23/22  2100 07/24/22  0820   POCGLU 241* 212* 164* 132     Blood Sugar Average: Last 72 hrs:  · (P) 342 [id number]   · A m  Glucose 98  · Reduce Lantus to 30 units q h s    · Continue mealtime coverage (increased 7/23) given mealtime hyperglycemia  · ADA diet

## 2022-07-24 NOTE — ASSESSMENT & PLAN NOTE
· Trach placed in the context of cardiac arrest/prolonged ventilator dependent state November 2021  · Decannulated at [facility] 12/11/21  · Patient requires frequent tracheostomy changes and suctioning  · Per discussion with speech therapy,  video barium swallow was done for sense of food getting stuck   Appropriate for regular diet  · On trach collar O2 with humidification

## 2022-07-24 NOTE — QUICK NOTE
Patient c/o SOB, increased oxygen requirement from 5 to 9L via trach mask despite trach suctioning  CT to water seal since the morning of 07/23  Ordered CXR which shows pulmonary edema and small right pleural effusion which is also visualized on 07/23 CXR  Rales auscultated on physical exam  She has received 3mg PO bumex on 07/23  Will give IV bumex 1mg once  Continue tracheostomy suctioning

## 2022-07-24 NOTE — ASSESSMENT & PLAN NOTE
· Potassium currently 3 2  · Resumed spironolactone and ARB 7/23  · Bumex dose increasing and given IV bumex overnight - likely etiology for K of 3 2  · Give 40 PO x 1 now  · Resume home dose of liquid potassium 40 meq PO BID  · Goal for K >4 given history of VT cardiac arrest  · K 6 1 on 7/20/22  · Was withheld from supplemental potassium, spironolactone and ARB and dose of Kayexalate was given

## 2022-07-24 NOTE — ASSESSMENT & PLAN NOTE
· STEMI 10/22/2021 s/p PATRICA mid circumflex OM1  OM2 was ballooned but not stented  · Pulseless VT 10/27/21 - repeat LHC 90% mid circumflex stenosis, but could not be intervened on  · VT 10/28/21 LHC:  found to have apical LAD complete occlusion was felt to be small to be intervened    · Cardiac carrest 1/1/22 - NO cardiac cath at [facility] felt to be hypoxic vs  VT induced  · On beta-blocker, Brilinta and Lipitor  · Resumed losartan however at half dose 7/23 to see if BP tolerates and resumed spironolactone 7/23

## 2022-07-24 NOTE — ASSESSMENT & PLAN NOTE
· Overnight the patient had increasing oxygen requirements and difficulty breathing, received Bumex repeat chest which did not show any worsening of her known pneumothorax  Chest tube remains on water seal   · This morning patient had once again increasing shortness of breath, pain and distress  · Suctioned with no significant change  · Chest x-ray showed no significant change from earlier  · Chest tube clamped  Repeat chest x-ray in 4 hours and hopefully remove chest tube at that time given suspect large amount of this is related to uncontrolled pain from chest tube site  · IV diuretics given earlier    Monitor response given mild volume overload

## 2022-07-24 NOTE — ASSESSMENT & PLAN NOTE
· Patient noted on imaging to have small pneumothorax at [facility], subsequently transferred to [facility]  · Chest tube placed 7/20, then upsized on 7/22 given no significant change an appearance that it was kinked  · Chest x-ray performed 7/23 shows interval resolution of pneumothorax - patient had been on water seal overnight  She developed worsening shortness of breath, hypoxia and increasing pain over the chest tube site  Chest x-ray showed no pneumothorax at that time  · Patient this morning once again with increasing shortness of breath and pain  · Discussed with Critical Care, anesthesia and pulmonary  · Administer IV Dilaudid, initiate IV ketamine for pain control  · Clamp chest tube  Repeat chest x-ray at 1:00 p m   If this shows no pneumo, can proceed with removal of chest tube today  · Patient was having significant pain around chest tube area  Status post exparel nerve block by acute pain service 7/23/2022 which resulted in significant pain relief however only lasted for short duration of time  Given this, acute pain service/anesthesia initiating ketamine today

## 2022-07-24 NOTE — ASSESSMENT & PLAN NOTE
· On anticoagulation with Eliquis   · Did have bleeding from chest tube site initially however this has subsequently stopped  · On amiodarone and Toprol which will be continued

## 2022-07-24 NOTE — QUICK NOTE
CXR done this afternoon still without pneumothorax  Chest tube was removed without complication, patient still with considerable pain at the site    Sterile dressing was applied

## 2022-07-24 NOTE — QUICK NOTE
Notified by jacek James Lieu to evaluate patient secondary to acute onset of shortness of breath  On evaluation patient complains of significant right anterior pain at chest tube site ongoing  Patient underwent nerve block yesterday  Anesthesia was at bedside  This is the driving force for shortness of breath  Repeat chest x-ray was done revealing no pneumothorax however mild pulmonary edema  IV Bumex had already been administered  IV Dilaudid given  Plan for anesthesia for pain control  Discussed case with Pulmonary Dr Jm Lobo and will clamp chest tube and repeat chest x-ray in 4 hours if okay plan to discontinue chest tube  Case discussed with nurse  If any acute onset of shortness of breath to notify critical care AP immediately  Consider placing chest tube back to suction and ordering stat chest x-ray

## 2022-07-24 NOTE — PROGRESS NOTES
Progress Note - Pulmonary   Aj Red 65 y o  female MRN: [id number]  Unit/Bed#: S -01 Encounter: [id number]    Assessment:  1  Acute pulmonary insufficiency on chronic hypoxemic respiratory failure  2  Primary spontaneous pneumothorax  3  Tracheostomy dependent secondary to MI  4  Suspected COPD of unknown severity without acute exacerbation  5  Mild CHANTALE    Plan:  Chest tube initially placed on 07/20, was upsized on 07/22  Chest x-ray done yesterday morning showed resolution of the pneumothorax and chest tube was placed to water seal  Overnight and this morning she has had worsening pain at the chest tube site despite nerve block and pain regimen  Chest tube was clamped this morning and will repeat a chest x-ray at 1:00 p m   If still without pneumothorax, chest tube can be removed  Cardiology has also been consulted for CHF/fluid overload seen on imaging  Continue albuterol as needed, no indication for steroids, trach collar with humidification  Follows with Dr Los Kilgore for CHANTALE, continue to hold BiPAP therapy  Will continue to follow  Subjective:   Patient resting in bed  Having increased pain at the chest tube site  Finds it hard to breathe due to the pain  Objective:     Vitals: Blood pressure 143/54, pulse 77, temperature 99 7 °F (37 6 °C), resp  rate (!) 30, weight 78 kg (172 lb), SpO2 95 %  ,Body mass index is 25 4 kg/m²  Intake/Output Summary (Last 24 hours) at 7/24/2022 1150  Last data filed at 7/24/2022 1126  Gross per 24 hour   Intake 490 ml   Output 450 ml   Net 40 ml       Invasive Devices  Report    Peripheral Intravenous Line  Duration           Peripheral IV 07/20/22 Right Antecubital 4 days    Peripheral IV 07/24/22 Right; Lower Forearm <1 day          Drain  Duration           Chest Tube 1 Right Other (Comment) 14 Fr  2 days          Airway  Duration           Surgical Airway Shiley Fenestrated 144 days                Physical Exam: /54   Pulse 77   Temp 99 7 °F (37 6 °C)   Resp (!) 30   Wt 78 kg (172 lb)   SpO2 95%   BMI 25 40 kg/m²   General appearance: alert and oriented, in no acute distress  Head: Normocephalic, without obvious abnormality, atraumatic  Eyes: negative findings: conjunctivae and sclerae normal  Lungs: diminished breath sounds and chest tube clamped  Heart: regular rate and rhythm  Abdomen: normal findings: soft, non-tender  Extremities: No edema  Skin: Warm and dry  Neurologic: Mental status: Alert, oriented, thought content appropriate     Labs: I have personally reviewed pertinent lab results  , CBC: No results found for: WBC, HGB, HCT, MCV, PLT, ADJUSTEDWBC, MCH, MCHC, RDW, MPV, NRBC, CMP:   Lab Results   Component Value Date    SODIUM 136 07/24/2022    K 3 2 (L) 07/24/2022    CL 94 (L) 07/24/2022    CO2 30 07/24/2022    BUN 36 (H) 07/24/2022    CREATININE 1 03 07/24/2022    CALCIUM 9 3 07/24/2022    EGFR 60 07/24/2022     Imaging and other studies: I have personally reviewed pertinent reports     and I have personally reviewed pertinent films in PACS

## 2022-07-25 VITALS — DIASTOLIC BLOOD PRESSURE: 70 MMHG | RESPIRATION RATE: 16 BRPM | HEART RATE: 70 BPM | SYSTOLIC BLOOD PRESSURE: 110 MMHG

## 2022-07-25 PROBLEM — J93.11 PRIMARY SPONTANEOUS PNEUMOTHORAX: Status: RESOLVED | Noted: 2022-07-18 | Resolved: 2022-07-25

## 2022-07-25 PROBLEM — N18.9 ACUTE KIDNEY INJURY SUPERIMPOSED ON CHRONIC KIDNEY DISEASE (HCC): Status: RESOLVED | Noted: 2022-07-19 | Resolved: 2022-07-25

## 2022-07-25 PROBLEM — N17.9 ACUTE KIDNEY INJURY SUPERIMPOSED ON CHRONIC KIDNEY DISEASE (HCC): Status: RESOLVED | Noted: 2022-07-19 | Resolved: 2022-07-25

## 2022-07-25 LAB
ALBUMIN SERPL BCP-MCNC: 2.8 G/DL (ref 3.5–5)
ALP SERPL-CCNC: 96 U/L (ref 34–104)
ALT SERPL W P-5'-P-CCNC: 7 U/L (ref 7–52)
ANION GAP SERPL CALCULATED.3IONS-SCNC: 10 MMOL/L (ref 4–13)
AST SERPL W P-5'-P-CCNC: 10 U/L (ref 13–39)
BASOPHILS # BLD AUTO: 0.04 THOUSANDS/ΜL (ref 0–0.1)
BASOPHILS NFR BLD AUTO: 0 % (ref 0–1)
BILIRUB SERPL-MCNC: 0.69 MG/DL (ref 0.2–1)
BUN SERPL-MCNC: 31 MG/DL (ref 5–25)
CALCIUM ALBUM COR SERPL-MCNC: 9.7 MG/DL (ref 8.3–10.1)
CALCIUM SERPL-MCNC: 8.7 MG/DL (ref 8.4–10.2)
CHLORIDE SERPL-SCNC: 98 MMOL/L (ref 96–108)
CO2 SERPL-SCNC: 27 MMOL/L (ref 21–32)
CREAT SERPL-MCNC: 0.88 MG/DL (ref 0.6–1.3)
EOSINOPHIL # BLD AUTO: 0.2 THOUSAND/ΜL (ref 0–0.61)
EOSINOPHIL NFR BLD AUTO: 1 % (ref 0–6)
ERYTHROCYTE [DISTWIDTH] IN BLOOD BY AUTOMATED COUNT: 17.2 % (ref 11.6–15.1)
GFR SERPL CREATININE-BSD FRML MDRD: 73 ML/MIN/1.73SQ M
GLUCOSE SERPL-MCNC: 120 MG/DL (ref 65–140)
GLUCOSE SERPL-MCNC: 125 MG/DL (ref 65–140)
GLUCOSE SERPL-MCNC: 135 MG/DL (ref 65–140)
GLUCOSE SERPL-MCNC: 172 MG/DL (ref 65–140)
GLUCOSE SERPL-MCNC: 266 MG/DL (ref 65–140)
GLUCOSE SERPL-MCNC: 79 MG/DL (ref 65–140)
HCT VFR BLD AUTO: 28.7 % (ref 34.8–46.1)
HGB BLD-MCNC: 8.7 G/DL (ref 11.5–15.4)
IMM GRANULOCYTES # BLD AUTO: 0.15 THOUSAND/UL (ref 0–0.2)
IMM GRANULOCYTES NFR BLD AUTO: 1 % (ref 0–2)
LYMPHOCYTES # BLD AUTO: 0.83 THOUSANDS/ΜL (ref 0.6–4.47)
LYMPHOCYTES NFR BLD AUTO: 5 % (ref 14–44)
MAGNESIUM SERPL-MCNC: 1.6 MG/DL (ref 1.9–2.7)
MCH RBC QN AUTO: 23 PG (ref 26.8–34.3)
MCHC RBC AUTO-ENTMCNC: 30.3 G/DL (ref 31.4–37.4)
MCV RBC AUTO: 76 FL (ref 82–98)
MONOCYTES # BLD AUTO: 1.17 THOUSAND/ΜL (ref 0.17–1.22)
MONOCYTES NFR BLD AUTO: 7 % (ref 4–12)
NEUTROPHILS # BLD AUTO: 13.43 THOUSANDS/ΜL (ref 1.85–7.62)
NEUTS SEG NFR BLD AUTO: 86 % (ref 43–75)
NRBC BLD AUTO-RTO: 0 /100 WBCS
PLATELET # BLD AUTO: 423 THOUSANDS/UL (ref 149–390)
PMV BLD AUTO: 9.9 FL (ref 8.9–12.7)
POTASSIUM SERPL-SCNC: 3.9 MMOL/L (ref 3.5–5.3)
PROT SERPL-MCNC: 7.3 G/DL (ref 6.4–8.4)
RBC # BLD AUTO: 3.79 MILLION/UL (ref 3.81–5.12)
SODIUM SERPL-SCNC: 135 MMOL/L (ref 135–147)
WBC # BLD AUTO: 15.82 THOUSAND/UL (ref 4.31–10.16)

## 2022-07-25 PROCEDURE — 99232 SBSQ HOSP IP/OBS MODERATE 35: CPT | Performed by: INTERNAL MEDICINE

## 2022-07-25 PROCEDURE — 80053 COMPREHEN METABOLIC PANEL: CPT | Performed by: PHYSICIAN ASSISTANT

## 2022-07-25 PROCEDURE — 82948 REAGENT STRIP/BLOOD GLUCOSE: CPT

## 2022-07-25 PROCEDURE — 99232 SBSQ HOSP IP/OBS MODERATE 35: CPT | Performed by: ANESTHESIOLOGY

## 2022-07-25 PROCEDURE — 99232 SBSQ HOSP IP/OBS MODERATE 35: CPT | Performed by: PHYSICIAN ASSISTANT

## 2022-07-25 PROCEDURE — 85025 COMPLETE CBC W/AUTO DIFF WBC: CPT | Performed by: PHYSICIAN ASSISTANT

## 2022-07-25 PROCEDURE — 94760 N-INVAS EAR/PLS OXIMETRY 1: CPT

## 2022-07-25 PROCEDURE — 94640 AIRWAY INHALATION TREATMENT: CPT

## 2022-07-25 PROCEDURE — 83735 ASSAY OF MAGNESIUM: CPT | Performed by: PHYSICIAN ASSISTANT

## 2022-07-25 RX ORDER — MAGNESIUM SULFATE HEPTAHYDRATE 40 MG/ML
2 INJECTION, SOLUTION INTRAVENOUS ONCE
Status: COMPLETED | OUTPATIENT
Start: 2022-07-25 | End: 2022-07-25

## 2022-07-25 RX ADMIN — BUMETANIDE 2 MG: 0.25 INJECTION INTRAMUSCULAR; INTRAVENOUS at 18:46

## 2022-07-25 RX ADMIN — METHOCARBAMOL 500 MG: 500 TABLET ORAL at 18:46

## 2022-07-25 RX ADMIN — INSULIN LISPRO 6 UNITS: 100 INJECTION, SOLUTION INTRAVENOUS; SUBCUTANEOUS at 21:19

## 2022-07-25 RX ADMIN — SPIRONOLACTONE 100 MG: 100 TABLET ORAL at 08:28

## 2022-07-25 RX ADMIN — POTASSIUM CHLORIDE 40 MEQ: 20 SOLUTION ORAL at 08:29

## 2022-07-25 RX ADMIN — DESMOPRESSIN ACETATE 40 MG: 0.2 TABLET ORAL at 16:24

## 2022-07-25 RX ADMIN — TICAGRELOR 90 MG: 90 TABLET ORAL at 04:57

## 2022-07-25 RX ADMIN — PREGABALIN 75 MG: 75 CAPSULE ORAL at 08:28

## 2022-07-25 RX ADMIN — POTASSIUM CHLORIDE 40 MEQ: 20 SOLUTION ORAL at 18:46

## 2022-07-25 RX ADMIN — INSULIN GLARGINE 33 UNITS: 100 INJECTION, SOLUTION SUBCUTANEOUS at 21:18

## 2022-07-25 RX ADMIN — BUMETANIDE 2 MG: 0.25 INJECTION INTRAMUSCULAR; INTRAVENOUS at 05:02

## 2022-07-25 RX ADMIN — FERROUS SULFATE TAB 325 MG (65 MG ELEMENTAL FE) 325 MG: 325 (65 FE) TAB at 08:29

## 2022-07-25 RX ADMIN — LOSARTAN POTASSIUM 50 MG: 50 TABLET, FILM COATED ORAL at 08:28

## 2022-07-25 RX ADMIN — PANTOPRAZOLE SODIUM 40 MG: 40 TABLET, DELAYED RELEASE ORAL at 05:02

## 2022-07-25 RX ADMIN — BUMETANIDE 2 MG: 0.25 INJECTION INTRAMUSCULAR; INTRAVENOUS at 11:11

## 2022-07-25 RX ADMIN — METHOCARBAMOL 500 MG: 500 TABLET ORAL at 05:02

## 2022-07-25 RX ADMIN — AMIODARONE HYDROCHLORIDE 100 MG: 200 TABLET ORAL at 08:29

## 2022-07-25 RX ADMIN — MAGNESIUM SULFATE HEPTAHYDRATE 2 G: 40 INJECTION, SOLUTION INTRAVENOUS at 10:53

## 2022-07-25 RX ADMIN — GUAIFENESIN 1200 MG: 600 TABLET ORAL at 08:28

## 2022-07-25 RX ADMIN — LEVALBUTEROL HYDROCHLORIDE 1.25 MG: 1.25 SOLUTION, CONCENTRATE RESPIRATORY (INHALATION) at 20:39

## 2022-07-25 RX ADMIN — APIXABAN 5 MG: 5 TABLET, FILM COATED ORAL at 08:28

## 2022-07-25 RX ADMIN — METOPROLOL SUCCINATE 50 MG: 50 TABLET, EXTENDED RELEASE ORAL at 16:23

## 2022-07-25 RX ADMIN — APIXABAN 5 MG: 5 TABLET, FILM COATED ORAL at 18:47

## 2022-07-25 RX ADMIN — IPRATROPIUM BROMIDE 0.5 MG: 0.5 SOLUTION RESPIRATORY (INHALATION) at 08:15

## 2022-07-25 RX ADMIN — LEVALBUTEROL HYDROCHLORIDE 1.25 MG: 1.25 SOLUTION, CONCENTRATE RESPIRATORY (INHALATION) at 13:40

## 2022-07-25 RX ADMIN — ESCITALOPRAM OXALATE 10 MG: 10 TABLET ORAL at 08:29

## 2022-07-25 RX ADMIN — IPRATROPIUM BROMIDE 0.5 MG: 0.5 SOLUTION RESPIRATORY (INHALATION) at 13:40

## 2022-07-25 RX ADMIN — DIAZEPAM 5 MG: 5 TABLET ORAL at 10:07

## 2022-07-25 RX ADMIN — TICAGRELOR 90 MG: 90 TABLET ORAL at 18:47

## 2022-07-25 RX ADMIN — HYDROMORPHONE HYDROCHLORIDE 2 MG: 2 TABLET ORAL at 16:24

## 2022-07-25 RX ADMIN — HYDROMORPHONE HYDROCHLORIDE 2 MG: 2 TABLET ORAL at 23:34

## 2022-07-25 RX ADMIN — INSULIN LISPRO 18 UNITS: 100 INJECTION, SOLUTION INTRAVENOUS; SUBCUTANEOUS at 12:50

## 2022-07-25 RX ADMIN — METOPROLOL SUCCINATE 50 MG: 50 TABLET, EXTENDED RELEASE ORAL at 04:57

## 2022-07-25 RX ADMIN — METHOCARBAMOL 500 MG: 500 TABLET ORAL at 11:11

## 2022-07-25 RX ADMIN — LEVALBUTEROL HYDROCHLORIDE 1.25 MG: 1.25 SOLUTION, CONCENTRATE RESPIRATORY (INHALATION) at 08:15

## 2022-07-25 RX ADMIN — INSULIN LISPRO 18 UNITS: 100 INJECTION, SOLUTION INTRAVENOUS; SUBCUTANEOUS at 08:42

## 2022-07-25 RX ADMIN — GUAIFENESIN 1200 MG: 600 TABLET ORAL at 21:18

## 2022-07-25 RX ADMIN — IPRATROPIUM BROMIDE 0.5 MG: 0.5 SOLUTION RESPIRATORY (INHALATION) at 20:39

## 2022-07-25 NOTE — ASSESSMENT & PLAN NOTE
· Patient noted on imaging to have small pneumothorax at [facility], subsequently transferred to [facility]  · Chest tube placed 7/20, then upsized on 7/22 given no significant change an appearance that it was kinked  · Chest x-ray performed 7/24 -no pneumothorax at that time  Chest tube was subsequently removed  · Patient had been experiencing significant pain around chest tube area  Status post nerve block by acute pain service 7/23/2022, as well as use of IV ketamine and Dilaudid    Pain now improved with chest tube out but not resolved

## 2022-07-25 NOTE — ASSESSMENT & PLAN NOTE
Lab Results   Component Value Date    EGFR 73 07/25/2022    EGFR 60 07/24/2022    EGFR 53 07/23/2022    CREATININE 0 88 07/25/2022    CREATININE 1 03 07/24/2022    CREATININE 1 15 07/23/2022   · Creatinine elevated at 1 64 upon transfer on 7/19/22, with baseline being 0 9-1 0  · Currently 0 88      · Monitor with increased diuresis  · Resumed spironolactone as well as losartan on 7/23

## 2022-07-25 NOTE — ASSESSMENT & PLAN NOTE
Wt Readings from Last 3 Encounters:   07/23/22 78 kg (172 lb)   07/19/22 78 2 kg (172 lb 6 4 oz)   06/29/22 81 7 kg (180 lb 3 2 oz)   · EF noted to be 50% on last echocardiogram 2/22  · Ad[facility]ac on 7/15 - given 80 mg IV lasix and then placed back on bumex 2 mg PO BID  · Developed MODESTO and thus bumex dose reduced - got 2 mg on 7/19, no bumex on on 7/20, 1 mg on 7/21, 2 mg on 7/23, 3 mg on 7/23 (home dose had been bumex 2 mg PO BID)  · Given that creatinine improved, Bumex resumed on 7/23 at home dose  · 7/24 AM with increasing SOB, oxygen requiements - given 1 mg IV bumex with 4-5 episodes of urine output (amt not recorded)   · CXR showed mild vascular congestion  · Suspect iatrogenic volume overload in the setting of reduced/withheld diuretic dosing  · Consulted cardiology, appreciate their input    Patient now receiving IV Bumex

## 2022-07-25 NOTE — PROGRESS NOTES
General Cardiology   Progress Note -  Team One   Jane Hicks 65 y o  female MRN: [id number]    Unit/Bed#: S -01 Encounter: [id number]    Assessment  1  Acute on Chronic HFmEF  2  ICM w/improved EF to 50% (previously 40-45% in 10/2021)  -On PE - evidence of volume overload  Bibasilar fine crackles, higher level supplemental O2 requirements  No significant peripheral edema   -Subjectively feels breathing has improved but not at baseline  -BNP level 165  -Chest x-ray 7/24/22 - pulmonary vascular congestion  -TTE 2/2022 - LVEF 50%, moderate hypokinesis of the inferior in the basal anterior lateral wall, moderate concentric hypertrophy, LA/RA mildly dilated, RV normal size/systolic function, mild TR  -GDM T - losartan 50 mg daily, spironolactone 100 mg daily, and metoprolol succinate 50 mg b i d   -Previously on oral Bumex 2 mg b i d as an outpatient (recently switched from furosemide 60 mg b i d  on 7/5)  -Currently on IV Bumex 2 mg b i d   -24 hour I&O balance; -100 ml; overall -2 2 L  -Weights; baseline/dry weights appear to be in the low to mid 180s  Standing scale weight on 7/23 - 172 lb ? Accuracy  3  CAD / lateral wall STEMI (10/22/2021) s/p PCI/PATRICA x 1 mid LCX into OM1, OM2 underwent balloon  -Denies anginal symptoms   -Last LHC on 10/28/21 showed patent OM1 stent, jailed mLCx, and new distal LAD occlusion too small for intervention     -On Brilinta 90 mg q12h, Eliquis 5 mg b i d , high-intensity statin, and BB  4  Paroxysmal atrial fibrillation  5  Hx of VT cardiac arrest  -Maintaining NSR   -On amiodarone 100 mg daily, and metoprolol succinate 50 mg b i d   -Wearing life vest at home  Pending EP follow-up as an outpatient for ICD consideration  6  Spontaneous right-sided pneumothorax   -Pulmonary following  -Right-sided chest tube removed on 07/24   7  Essential hypertension  -Average /71, last recorded at 134/72    -BP regimen - losartan 50 mg daily, metoprolol succinate 50 mg b i d , and spironolactone 100 mg daily  8  Poorly controlled DM  -HGB A1c 12 7 - 5/2022  Plan  -Continue IV diuretics today  Volume/respiratory status appeared to be improving  Pt reports good urinary response and improvement in her breathing over the last 24 hours  I&Os are inaccurate and no standing weights were obtained - discussed with RN staff  Continue strict I&Os, daily standing weights, and 2 g NA +diet 1 8 L FR   -Continue losartan 50 mg daily, metoprolol succinate 50 mg b i d , and spironolactone 100 mg daily  BP well controlled at this time   -Continue amiodarone 100 mg daily  No significant arrhythmias identified on telemetry monitoring  Continue to monitor while hospitalized  -Replete magnesium Mag level   -Re-application of Life Vest upon discharge   -Follow-up with cardiology/EP service upon discharge  Subjective  Review of Systems   Constitutional: Positive for malaise/fatigue  Negative for chills and fever  Eyes: Negative for visual disturbance  Cardiovascular: Positive for dyspnea on exertion  Negative for chest pain, leg swelling, orthopnea and palpitations  Respiratory: Positive for cough and shortness of breath  Neurological: Negative for dizziness, headaches and light-headedness  Objective:   Physical Exam  Vitals and nursing note reviewed  Constitutional:       General: She is not in acute distress  Appearance: She is not diaphoretic  HENT:      Head: Normocephalic and atraumatic  Eyes:      General: No scleral icterus  Neck:      Comments: No significant JVD  Cardiovascular:      Rate and Rhythm: Normal rate and regular rhythm  Pulses: Normal pulses  Heart sounds: Normal heart sounds  Pulmonary:      Effort: Pulmonary effort is normal       Breath sounds: Rales present  Abdominal:      Palpations: Abdomen is soft  Musculoskeletal:      Cervical back: Neck supple  Right lower leg: No edema  Left lower leg: No edema     Skin: General: Skin is warm and dry  Capillary Refill: Capillary refill takes less than 2 seconds  Neurological:      General: No focal deficit present  Mental Status: She is alert  Psychiatric:         Mood and Affect: Mood normal          Vitals: Blood pressure 134/72, pulse 70, temperature 99 1 °F (37 3 °C), resp  rate 18, weight 78 kg (172 lb), SpO2 97 %  ,     Body mass index is 25 4 kg/m²  ,   Systolic (78XHR), JUSTIN:172 , Min:114 , QQX:118     Diastolic (24UBB), PKE:35, Min:69, Max:74      Intake/Output Summary (Last 24 hours) at 7/25/2022 0941  Last data filed at 7/25/2022 0401  Gross per 24 hour   Intake 250 ml   Output 350 ml   Net -100 ml     Weight (last 2 days)     Date/Time Weight    07/23/22 0600 78 (172)          LABORATORY RESULTS      CBC with diff:   Results from last 7 days   Lab Units 07/25/22  0522 07/24/22  1213 07/23/22  0502 07/20/22  0430 07/19/22  1359 07/19/22  0510   WBC Thousand/uL 15 82* 21 17* 21 72* 12 03* 14 59* 12 52*   HEMOGLOBIN g/dL 8 7* 9 0* 9 4* 9 9* 10 1* 10 2*   HEMATOCRIT % 28 7* 28 9* 31 0* 32 9* 34 4* 35 1   MCV fL 76* 76* 78* 78* 79* 78*   PLATELETS Thousands/uL 423* 449* 433* 542* 592* 537*   MCH pg 23 0* 23 6* 23 6* 23 4* 23 2* 22 7*   MCHC g/dL 30 3* 31 1* 30 3* 30 1* 29 4* 29 1*   RDW % 17 2* 17 2* 17 0* 16 6* 16 5* 16 6*   MPV fL 9 9 10 2 9 6 9 6 9 6 10 2   NRBC AUTO /100 WBCs 0 0 0 0 0 0       CMP:  Results from last 7 days   Lab Units 07/25/22  0522 07/24/22  0508 07/23/22  0502 07/22/22  0451 07/21/22  0542 07/20/22  1245 07/20/22  0430   POTASSIUM mmol/L 3 9 3 2* 3 7 4 1 4 5 4 8 6 1*   CHLORIDE mmol/L 98 94* 96 98 96 95* 92*   CO2 mmol/L 27 30 31 27 31 30 32   BUN mg/dL 31* 36* 38* 45* 42* 44* 43*   CREATININE mg/dL 0 88 1 03 1 15 1 24 1 26 1 37* 1 36*   CALCIUM mg/dL 8 7 9 3 8 8 8 8 9 2 9 2 9 2   AST U/L 10*  --   --   --   --   --   --    ALT U/L 7  --   --   --   --   --   --    ALK PHOS U/L 96  --   --   --   --   --   --    EGFR ml/min/1 73sq m 73 60 53 48 47 43 43       BMP:  Results from last 7 days   Lab Units 07/25/22  0522 07/24/22  0508 07/23/22  0502 07/22/22  0451 07/21/22  0542 07/20/22  1245 07/20/22  0430   POTASSIUM mmol/L 3 9 3 2* 3 7 4 1 4 5 4 8 6 1*   CHLORIDE mmol/L 98 94* 96 98 96 95* 92*   CO2 mmol/L 27 30 31 27 31 30 32   BUN mg/dL 31* 36* 38* 45* 42* 44* 43*   CREATININE mg/dL 0 88 1 03 1 15 1 24 1 26 1 37* 1 36*   CALCIUM mg/dL 8 7 9 3 8 8 8 8 9 2 9 2 9 2       Lab Results   Component Value Date    NTBNP 4,179 (H) 06/27/2022    NTBNP 4,406 (H) 06/06/2022    NTBNP 3,158 (H) 10/22/2021        Results from last 7 days   Lab Units 07/25/22  0522 07/24/22  0508   MAGNESIUM mg/dL 1 6* 1 6*                         Lipid Profile:   No results found for: CHOL  Lab Results   Component Value Date    HDL 55 10/23/2021    HDL 45 (L) 04/02/2021    HDL 44 05/26/2020     Lab Results   Component Value Date    LDLCALC 36 10/23/2021    LDLCALC 44 04/02/2021    LDLCALC 68 05/26/2020     Lab Results   Component Value Date    TRIG 75 10/23/2021    TRIG 133 7 04/02/2021    TRIG 157 (H) 05/26/2020       Cardiac testing:   No results found for this or any previous visit  No results found for this or any previous visit  No results found for this or any previous visit  No valid procedures specified  No results found for this or any previous visit        Meds/Allergies   all current active meds have been reviewed and current meds:   Current Facility-Administered Medications   Medication Dose Route Frequency    acetaminophen (TYLENOL) tablet 975 mg  975 mg Oral Q6H [address]    albuterol inhalation solution 2 5 mg  2 5 mg Nebulization Q4H PRN    amiodarone tablet 100 mg  100 mg Oral Daily With Breakfast    apixaban (ELIQUIS) tablet 5 mg  5 mg Oral BID    atorvastatin (LIPITOR) tablet 40 mg  40 mg Oral Daily With Dinner    benzonatate (TESSALON PERLES) capsule 100 mg  100 mg Oral TID PRN    bumetanide (BUMEX) injection 2 mg  2 mg Intravenous TID (diuretic)    diazepam (VALIUM) tablet 5 mg  5 mg Oral Q6H PRN    escitalopram (LEXAPRO) tablet 10 mg  10 mg Oral Daily    fentanyl citrate (PF) 100 MCG/2ML   Intravenous Code/Trauma/Sedation Med    ferrous sulfate tablet 325 mg  325 mg Oral Daily With Breakfast    guaiFENesin (MUCINEX) 12 hr tablet 1,200 mg  1,200 mg Oral Q12H CEFERINO    HYDROmorphone (DILAUDID) injection 0 5 mg  0 5 mg Intravenous Q2H PRN    HYDROmorphone (DILAUDID) tablet 2 mg  2 mg Oral Q4H PRN    HYDROmorphone (DILAUDID) tablet 4 mg  4 mg Oral Q4H PRN    insulin glargine (LANTUS) subcutaneous injection 33 Units 0 33 mL  33 Units Subcutaneous HS    insulin lispro (HumaLOG) 100 units/mL subcutaneous injection 18 Units  18 Units Subcutaneous TID With Meals    insulin lispro (HumaLOG) 100 units/mL subcutaneous injection 2-12 Units  2-12 Units Subcutaneous TID AC    insulin lispro (HumaLOG) 100 units/mL subcutaneous injection 2-12 Units  2-12 Units Subcutaneous HS    ipratropium (ATROVENT) 0 02 % inhalation solution 0 5 mg  0 5 mg Nebulization TID    levalbuterol (XOPENEX) inhalation solution 1 25 mg  1 25 mg Nebulization TID    losartan (COZAAR) tablet 50 mg  50 mg Oral Daily    methocarbamol (ROBAXIN) tablet 500 mg  500 mg Oral Q6H [facility]    metoprolol succinate (TOPROL-XL) 24 hr tablet 50 mg  50 mg Oral Q12H    pantoprazole (PROTONIX) EC tablet 40 mg  40 mg Oral Early Morning    potassium chloride oral solution 40 mEq  40 mEq Oral BID    pregabalin (LYRICA) capsule 75 mg  75 mg Oral Daily    spironolactone (ALDACTONE) tablet 100 mg  100 mg Oral Daily    ticagrelor (BRILINTA) tablet 90 mg  90 mg Oral Q12H    trimethobenzamide (TIGAN) IM injection 200 mg  200 mg Intramuscular Q6H PRN          EKG personally reviewed by NEELAM Cardona    Assessment:  Principal Problem:    Primary spontaneous pneumothorax  Active Problems:    Type 2 diabetes mellitus with hyperglycemia (HCC)    A-fib ([facility] [address] )    History of Cardiac arrest ([facility] [address] )    CAD (coronary artery disease)    Tracheostomy in place [facility])    Acute on chronic HFrEF (heart failure with reduced ejection fraction) ([facility])    Shortness of breath    Hypokalemia    Acute kidney injury superimposed on chronic kidney disease stage 3 ([facility] [address] )    Leukocytosis    Respiratory Distress    Counseling / Coordination of Care  Total floor / unit time spent today 20 minutes  Greater than 50% of total time was spent with the patient and / or family counseling and / or coordination of care  ** Please Note: Dragon 360 Dictation voice to text software may have been used in the creation of this document   **

## 2022-07-25 NOTE — PROGRESS NOTES
[facility]  Progress Note - Linda Fam 1965, 65 y o  female MRN: [id number]  Unit/Bed#: S -01 Encounter: [id number]  Primary Care Provider: aDina Coyne MD   Date and time admitted to hospital: 7/19/2022 11:33 AM    * Primary spontaneous pneumothorax-resolved as of 7/25/2022  Assessment & Plan  · Patient noted on imaging to have small pneumothorax at [facility], subsequently [facility]  · Chest tube placed 7/20, then upsized on 7/22 given no significant change an appearance that it was kinked  · Chest x-ray performed 7/24 -no pneumothorax at that time  Chest tube was subsequently removed  · Patient had been experiencing significant pain around chest tube area  Status post nerve block by acute pain service 7/23/2022, as well as use of IV ketamine and Dilaudid  Pain now improved with chest tube out but not resolved    Acute on chronic HFrEF (heart failure with reduced ejection fraction) ([facility])  Assessment & Plan  Wt Readings from Last 3 Encounters:   07/23/22 78 kg (172 lb)   07/19/22 78 2 kg (172 lb 6 4 oz)   06/29/22 81 7 kg (180 lb 3 2 oz)   · EF noted to be 50% on last echocardiogram 2/22  · Admitted to [facility] for CHF exac on 7/15 - given 80 mg IV lasix and then placed back on bumex 2 mg PO BID  · Developed MODESTO and thus bumex dose reduced - got 2 mg on 7/19, no bumex on on 7/20, 1 mg on 7/21, 2 mg on 7/23, 3 mg on 7/23 (home dose had been bumex 2 mg PO BID)  · Given that creatinine improved, Bumex resumed on 7/23 at home dose  · 7/24 AM with increasing SOB, oxygen requiements - given 1 mg IV bumex with 4-5 episodes of urine output (amt not recorded)   · CXR showed mild vascular congestion  · Suspect iatrogenic volume overload in the setting of reduced/withheld diuretic dosing  · Consulted cardiology, appreciate their input    Patient now receiving IV Bumex    Tracheostomy in place [facility])  Assessment & Plan  · Trach placed in the context of cardiac arrest/prolonged ventilator dependent state November 2021  · Decannulated at [facility] 12/11/21  · Patient requires frequent tracheostomy changes and suctioning  · Per discussion with speech therapy,  video barium swallow was done for sense of food getting stuck  Appropriate for regular diet  · On trach collar O2 with humidification  · Seems to have more thick secretions noted today--check procal    Type 2 diabetes mellitus with hyperglycemia [facility])  Assessment & Plan  Lab Results   Component Value Date    HGBA1C 12 7 (H) 05/22/2022     Recent Labs     07/24/22  1200 07/24/22  1620 07/24/22 2052 07/25/22  0743   POCGLU 153* 190* 107 125     Blood Sugar Average: Last 72 hrs:  · (P) 170 [id number]   · Very poorly controlled based on A1c but current blood sugars are essentially at goal on current regimen of basal bolus insulin  · ADA diet    Acute kidney injury superimposed on chronic kidney disease stage 3 (HCC)-resolved as of 7/25/2022  Assessment & Plan  Lab Results   Component Value Date    EGFR 73 07/25/2022    EGFR 60 07/24/2022    EGFR 53 07/23/2022    CREATININE 0 88 07/25/2022    CREATININE 1 03 07/24/2022    CREATININE 1 15 07/23/2022   · Creatinine elevated at 1 64 upon transfer on 7/19/22, with baseline being 0 9-1 0  · Currently 0 88      · Monitor with increased diuresis  · Resumed spironolactone as well as losartan on 7/23    A-fib [facility])  Assessment & Plan  · On anticoagulation with Eliquis   · Caution for bleeding at chest tube site  · On amiodarone and Toprol which will be continued    Hypokalemia  Assessment & Plan  · Potassium currently 3 9  · Resumed spironolactone and ARB 7/23  · Resumed home dose of liquid potassium 40 meq PO BID  · Goal for K >4 given history of VT cardiac arrest  · K 6 1 on 7/20/22  · Was withheld from supplemental potassium, spironolactone and ARB and dose of Kayexalate was given    CAD (coronary artery disease)  Assessment & Plan  · STEMI 10/22/2021 s/p PATRICA mid circumflex OM1  OM2 was ballooned but not stented  · Pulseless VT 10/27/21 - repeat LHC 90% mid circumflex stenosis, but could not be intervened on  · VT 10/28/21 LHC:  found to have apical LAD complete occlusion was felt to be small to be intervened  · Cardiac carrest 1/1/22 - NO cardiac cath at [facility] felt to be hypoxic vs  VT induced  · On beta-blocker, Brilinta and Lipitor  · Resumed losartan however at half dose 7/23 to see if BP tolerates and resumed spironolactone 7/23    History of Cardiac arrest [facility])  Assessment & Plan  · STEMI 10/22/21 - PATRICA to mid circumflex  · VT arrest 10/26   · Polymorphic VT x 1 [id number]/28/21  · ICD not placed given risks felt to outweigh benefits with cognitive function and risk of infection at that that time  · Cardiac arrest 1/1/22 - likely VT related - went to [facility]  · No ICD given lung infection and on IV ABX x 4 weeks  · Canceled 2 EP appts since that time and thus no ICD in place - still wears LifeVest  · EF seems to have always been 40% or higher    Leukocytosis  Assessment & Plan  · Patient with significant increase in WBC as of 7/23 compared to 3 days prior when white blood cell count was 12   · Increased to 21  · Repeat CBC with diff now 15k  · Patient with T-max of 99 7°  · Chest x-rays being performed secondary to chest tube do not show any evidence of overt pneumonia  · No other source of infection identified  · Likely reactive in the setting of chest tube insertions and pneumothorax      VTE Pharmacologic Prophylaxis: VTE Score: 3 eliquis    Patient Centered Rounds: I performed bedside rounds with nursing staff today  Discussions with Specialists or Other Care Team Provider: case mgmt, pulm    Education and Discussions with Family / Patient: Patient declined call to   Time Spent for Care: 30 minutes  More than 50% of total time spent on counseling and coordination of care as described above      Current Length of Stay: 6 day(s)  Current Patient Status: Inpatient   Certification Statement: The patient will continue to require additional inpatient hospital stay due to Ongoing IV Bumex  Discharge Plan: Home when cleared with Cardiology and pulmonology    Code Status: Level 1 - Full Code    Subjective:   Patient still having pain from the chest tube insertion site though it is improved, and she is happy the chest tube is out    Objective:     Vitals:   Temp (24hrs), Av 3 °F (37 4 °C), Min:99 °F (37 2 °C), Max:99 5 °F (37 5 °C)    Temp:  [99 °F (37 2 °C)-99 5 °F (37 5 °C)] 99 1 °F (37 3 °C)  HR:  [68-73] 70  Resp:  [18] 18  BP: (114-134)/(69-74) 134/72  SpO2:  [93 %-97 %] 97 %  Body mass index is 25 4 kg/m²  Input and Output Summary (last 24 hours): Intake/Output Summary (Last 24 hours) at 2022 1017  Last data filed at 2022 0401  Gross per 24 hour   Intake 250 ml   Output 350 ml   Net -100 ml       Physical Exam:   Physical Exam  Vitals reviewed  Constitutional:       General: She is not in acute distress  Appearance: She is not ill-appearing, toxic-appearing or diaphoretic  Comments: No distress or obvious severe pain   Eyes:      General: No scleral icterus  Right eye: No discharge  Left eye: No discharge  Conjunctiva/sclera: Conjunctivae normal    Neck:      Comments: Thick slightly yellowish secretions noted on gown from trach  Cardiovascular:      Rate and Rhythm: Normal rate and regular rhythm  Heart sounds: No murmur heard  Pulmonary:      Effort: No respiratory distress  Breath sounds: No stridor  Rhonchi and rales present  No wheezing  Comments: Bloody chest tube dressing  Abdominal:      General: There is no distension  Palpations: Abdomen is soft  Tenderness: There is no abdominal tenderness  There is no guarding  Musculoskeletal:      Right lower leg: No edema  Left lower leg: No edema  Skin:     General: Skin is warm and dry        Coloration: Skin is not jaundiced or pale  Findings: No bruising, erythema, lesion or rash  Neurological:      General: No focal deficit present  Mental Status: She is alert  Mental status is at baseline  Comments: Awake alert follows commands, no confusion   Psychiatric:         Mood and Affect: Mood normal           Additional Data:     Labs:  Results from last 7 days   Lab Units 07/25/22  0522   WBC Thousand/uL 15 82*   HEMOGLOBIN g/dL 8 7*   HEMATOCRIT % 28 7*   PLATELETS Thousands/uL 423*   NEUTROS PCT % 86*   LYMPHS PCT % 5*   MONOS PCT % 7   EOS PCT % 1     Results from last 7 days   Lab Units 07/25/22  0522   SODIUM mmol/L 135   POTASSIUM mmol/L 3 9   CHLORIDE mmol/L 98   CO2 mmol/L 27   BUN mg/dL 31*   CREATININE mg/dL 0 88   ANION GAP mmol/L 10   CALCIUM mg/dL 8 7   ALBUMIN g/dL 2 8*   TOTAL BILIRUBIN mg/dL 0 69   ALK PHOS U/L 96   ALT U/L 7   AST U/L 10*   GLUCOSE RANDOM mg/dL 120         Results from last 7 days   Lab Units 07/25/22  0743 07/24/22  2052 07/24/22  1620 07/24/22  1200 07/24/22  0820 07/23/22  2100 07/23/22  1620 07/23/22  1253 07/23/22  0810 07/22/22  2111 07/22/22  1600 07/22/22  1031   POC GLUCOSE mg/dl 125 107 190* 153* 132 164* 212* 241* 235* 267* 272* 219*         Results from last 7 days   Lab Units 07/19/22  1359 07/19/22  0510   PROCALCITONIN ng/ml 0 22 0 27*       Lines/Drains:  Invasive Devices  Report    Peripheral Intravenous Line  Duration           Peripheral IV 07/20/22 Right Antecubital 5 days    Peripheral IV 07/24/22 Right; Lower Forearm 1 day          Airway  Duration           Surgical Airway Shiley Fenestrated 145 days                  Telemetry:  Telemetry Orders (From admission, onward)             LifeVest Patient: Continuous Telemetry Monitoring during hospitalization (non-expiring)  Continuous LifeVest Telemetry Monitoring        References:    LifeVest Policy                 Telemetry Reviewed: NSR  Indication for Continued Telemetry Use:life vest           Imaging:cxr 7/24    Recent Cultures (last 7 days):         Last 24 Hours Medication List:   Current Facility-Administered Medications   Medication Dose Route Frequency Provider Last Rate    acetaminophen  975 mg Oral Q6H [address] Leda Chand PA-C      albuterol  2 5 mg Nebulization Q4H PRN Leda Chand PA-C      amiodarone  100 mg Oral Daily With Breakfast Leda Chand PA-C      apixaban  5 mg Oral BID Leda Chand PA-C      atorvastatin  40 mg Oral Daily With [employer]      benzonatate  100 mg Oral TID PRN Edel Goetz MD      bumetanide  2 mg Intravenous TID (diuretic) NEELAM Fontaine      diazepam  5 mg Oral Q6H PRN Robi Nelson PA-C      escitalopram  10 mg Oral Daily Leda Chand PA-C      fentanyl citrate (PF)   Intravenous Code/Trauma/Sedation Med Socorro Abbott MD      ferrous sulfate  325 mg Oral Daily With Breakfast Leda Chand PA-C      guaiFENesin  1,200 mg Oral Q12H [address] Leda Chand PA-C      HYDROmorphone  0 5 mg Intravenous Q2H PRN Jagdish Baptiste MD      HYDROmorphone  2 mg Oral Q4H PRN Ratna Reina PA-C      HYDROmorphone  4 mg Oral Q4H PRN Etta Nelson PA-C      insulin glargine  33 Units Subcutaneous HS Leda Chand PA-C      insulin lispro  18 Units Subcutaneous TID With Meals Etta Nelson PA-C      insulin lispro  2-12 Units Subcutaneous TID AC Leda Chand PA-C      insulin lispro  2-12 Units Subcutaneous HS Leda Chand PA-C      ipratropium  0 5 mg Nebulization TID Leda Chand PA-C      levalbuterol  1 25 mg Nebulization TID Leda Chand PA-C      losartan  50 mg Oral Daily Etta Nelson PA-C      magnesium sulfate  2 g Intravenous Once NEELAM Morris      methocarbamol  500 mg Oral Q6H [address] Jagdish Baptiste MD      metoprolol succinate  50 mg Oral Q12H Leda Chand PA-C      pantoprazole  40 mg Oral Early Morning Leda Chand PA-C      potassium chloride  40 mEq Oral BID Ratna Reina PA-C      pregabalin  75 mg Oral Daily Leda Chand PA-C      spironolactone  100 mg Oral Daily Dolores Schaffer PA-C      ticagrelor  90 mg Oral Q12H Leda Chand PA-C      trimethobenzamide  200 mg Intramuscular Q6H PRN Ralf Alves PA-C          Today, Patient Was Seen By: Germaine Vila PA-C    **Please Note: This note may have been constructed using a voice recognition system  **

## 2022-07-25 NOTE — ASSESSMENT & PLAN NOTE
· Potassium currently 3 9  · Resumed spironolactone and ARB 7/23  · Resumed home dose of liquid potassium 40 meq PO BID  · Goal for K >4 given history of VT cardiac arrest  · K 6 1 on 7/20/22  · Was withheld from supplemental potassium, spironolactone and ARB and dose of Kayexalate was given

## 2022-07-25 NOTE — PLAN OF CARE
Problem: Potential for Falls  Goal: Patient will remain free of falls  Description: INTERVENTIONS:  - Educate patient/family on patient safety including physical limitations  - Instruct patient to call for assistance with activity   - Consult OT/PT to assist with strengthening/mobility   - Keep Call bell within reach  - Keep bed low and locked with side rails adjusted as appropriate  - Keep care items and personal belongings within reach  - Initiate and maintain comfort rounds  - Make Fall Risk Sign visible to staff  - Apply yellow socks and bracelet for high fall risk patients  - Consider moving patient to room near nurses station  Outcome: Progressing     Problem: Nutrition/Hydration-ADULT  Goal: Nutrient/Hydration intake appropriate for improving, restoring or maintaining nutritional needs  Description: Monitor and assess patient's nutrition/hydration status for malnutrition  Collaborate with interdisciplinary team and initiate plan and interventions as ordered  Monitor patient's weight and dietary intake as ordered or per policy  Utilize nutrition screening tool and intervene as necessary  Determine patient's food preferences and provide high-protein, high-caloric foods as appropriate       INTERVENTIONS:  - Monitor oral intake, urinary output, labs, and treatment plans  - Assess nutrition and hydration status and recommend course of action  - Evaluate amount of meals eaten  - Assist patient with eating if necessary   - Allow adequate time for meals  - Recommend/ encourage appropriate diets, oral nutritional supplements, and vitamin/mineral supplements  - Order, calculate, and assess calorie counts as needed  - Recommend, monitor, and adjust tube feedings and TPN/PPN based on assessed needs  - Assess need for intravenous fluids  - Provide specific nutrition/hydration education as appropriate  - Include patient/family/caregiver in decisions related to nutrition  Outcome: Progressing     Problem: PAIN - ADULT  Goal: Verbalizes/displays adequate comfort level or baseline comfort level  Description: Interventions:  - Encourage patient to monitor pain and request assistance  - Assess pain using appropriate pain scale  - Administer analgesics based on type and severity of pain and evaluate response  - Implement non-pharmacological measures as appropriate and evaluate response  - Consider cultural and social influences on pain and pain management  - Notify physician/advanced practitioner if interventions unsuccessful or patient reports new pain  Outcome: Progressing     Problem: INFECTION - ADULT  Goal: Absence or prevention of progression during hospitalization  Description: INTERVENTIONS:  - Assess and monitor for signs and symptoms of infection  - Monitor lab/diagnostic results  - Monitor all insertion sites, i e  indwelling lines, tubes, and drains  - Monitor endotracheal if appropriate and nasal secretions for changes in amount and color  - [place] appropriate cooling/warming therapies per order  - Administer medications as ordered  - Instruct and encourage patient and family to use good hand hygiene technique  - Identify and instruct in appropriate isolation precautions for identified infection/condition  Outcome: Progressing  Goal: Absence of fever/infection during neutropenic period  Description: INTERVENTIONS:  - Monitor WBC    Outcome: Progressing     Problem: SAFETY ADULT  Goal: Patient will remain free of falls  Description: INTERVENTIONS:  - Educate patient/family on patient safety including physical limitations  - Instruct patient to call for assistance with activity   - Consult OT/PT to assist with strengthening/mobility   - Keep Call bell within reach  - Keep bed low and locked with side rails adjusted as appropriate  - Keep care items and personal belongings within reach  - Initiate and maintain comfort rounds  - Make Fall Risk Sign visible to staff  - Apply yellow socks and bracelet for high fall risk patients  - Consider moving patient to room near nurses station  Outcome: Progressing  Goal: Maintain or return to baseline ADL function  Description: INTERVENTIONS:  -  Assess patient's ability to carry out ADLs; assess patient's baseline for ADL function and identify physical deficits which impact ability to perform ADLs (bathing, care of mouth/teeth, toileting, grooming, dressing, etc )  - Assess/evaluate cause of self-care deficits   - Assess range of motion  - Assess patient's mobility; develop plan if impaired  - Assess patient's need for assistive devices and provide as appropriate  - Encourage maximum independence but intervene and supervise when necessary  - Involve family in performance of ADLs  - Assess for home care needs following discharge   - Consider OT consult to assist with ADL evaluation and planning for discharge  - Provide patient education as appropriate  Outcome: Progressing  Goal: Maintains/Returns to pre admission functional level  Description: INTERVENTIONS:  - Perform BMAT or MOVE assessment daily    - Set and communicate daily mobility goal to care team and patient/family/caregiver     - Collaborate with rehabilitation services on mobility goals if consulted  - Out of bed to chair 3 times a day   - Out of bed for meals 3 times a day  - Out of bed for toileting  - Record patient progress and toleration of activity level   Outcome: Progressing     Problem: DISCHARGE PLANNING  Goal: Discharge to home or other facility with appropriate resources  Description: INTERVENTIONS:  - Identify barriers to discharge w/patient and caregiver  - Arrange for needed discharge resources and transportation as appropriate  - Identify discharge learning needs (meds, wound care, etc )  - Arrange for interpretive services to assist at discharge as needed  - Refer to Case Management Department for coordinating discharge planning if the patient needs post-hospital services based on physician/advanced practitioner order or complex needs related to functional status, cognitive ability, or social support system  Outcome: Progressing     Problem: Knowledge Deficit  Goal: Patient/family/caregiver demonstrates understanding of disease process, treatment plan, medications, and discharge instructions  Description: Complete learning assessment and assess knowledge base  Interventions:  - Provide teaching at level of understanding  - Provide teaching via preferred learning methods  Outcome: Progressing     Problem: MOBILITY - ADULT  Goal: Maintain or return to baseline ADL function  Description: INTERVENTIONS:  -  Assess patient's ability to carry out ADLs; assess patient's baseline for ADL function and identify physical deficits which impact ability to perform ADLs (bathing, care of mouth/teeth, toileting, grooming, dressing, etc )  - Assess/evaluate cause of self-care deficits   - Assess range of motion  - Assess patient's mobility; develop plan if impaired  - Assess patient's need for assistive devices and provide as appropriate  - Encourage maximum independence but intervene and supervise when necessary  - Involve family in performance of ADLs  - Assess for home care needs following discharge   - Consider OT consult to assist with ADL evaluation and planning for discharge  - Provide patient education as appropriate  Outcome: Progressing  Goal: Maintains/Returns to pre admission functional level  Description: INTERVENTIONS:  - Perform BMAT or MOVE assessment daily    - Set and communicate daily mobility goal to care team and patient/family/caregiver     - Collaborate with rehabilitation services on mobility goals if consulted  -- Out of bed to chair 3 times a day   - Out of bed for meals 3 times a day  - Out of bed for toileting  - Record patient progress and toleration of activity level   Outcome: Progressing     Problem: Prexisting or High Potential for Compromised Skin Integrity  Goal: Skin integrity is maintained or improved  Description: INTERVENTIONS:  - Identify patients at risk for skin breakdown  - Assess and monitor skin integrity  - Assess and monitor nutrition and hydration status  - Monitor labs   - Assess for incontinence   - Turn and reposition patient  - Assist with mobility/ambulation  - Relieve pressure over bony prominences  - Avoid friction and shearing  - Provide appropriate hygiene as needed including keeping skin clean and dry  - Evaluate need for skin moisturizer/barrier cream  - Collaborate with interdisciplinary team   - Patient/family teaching  - Consider wound care consult   Outcome: Progressing

## 2022-07-25 NOTE — PROGRESS NOTES
Progress Note - Acute Pain Service    Danyel Zabala 65 y o  female MRN: [id number]  Unit/Bed#: S -01 Encounter: [id number]      Assessment:   Principal Problem:    Primary spontaneous pneumothorax  Active Problems:    Type 2 diabetes mellitus with hyperglycemia (HCC)    A-fib (HCC)    History of Cardiac arrest (HCC)    CAD (coronary artery disease)    Tracheostomy in place [facility])    Acute on chronic HFrEF (heart failure with reduced ejection fraction) ([facility])    Shortness of breath    Hypokalemia    Acute kidney injury superimposed on chronic kidney disease stage 3 (HCC)    Leukocytosis    Respiratory Distress  Kelli Proctor is a 65 y o  female with PMHx of VT arrest c/b prolonged intubation/ICU stay (11/2021) 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, HFrEF 2/2 ICM with EF recovery (last EF 50% in 02/2022 per TTE), T2DM and CKD III who presented with right (likely primary) PTX s/p chest tube placement  She underwent a right ES plane block with exparel on 7/23 with limited analgesic benefit (6-8 hours)  APS consulted for post-procedural pain control  Her SOB noted yesterday by APS has improved; she is still on trach collar  Her chest tube was removed yesterday though she still has pain at the site  Her pain level is currently a 5/10  She does not want any additional interventions that we could offer  She states "she's fine "    Ketamine infusion has been discontinued  Plan:   1  Discontinue IV opioids after today  2  Continue multimodal analgesics: acetaminopehn, Lyrica, robaxin  3  Contiue dilaudid tablets 2/4 mg q4h PRN mod/severe pain  APS sign off  Thank you for the Consult    Please contact APS ( [phone]) with any further questions    Pain History  Current pain location(s): anterior chest  Pain Scale:   5/10  Quality: throbbing  24 hour history: chest tube removed, ketamine infusion started, then stopped    Opioid requirement previous 24 hours: 1 dose 0 5 mg dilaudid IV,   1 dose 2mg PO dilaudid, 2 doses 4 mg PO dilaudid  Meds/Allergies   all current active meds have been reviewed    Allergies   Allergen Reactions    Metformin Diarrhea    Clonidine Rash     Patch        Objective     Temp:  [99 °F (37 2 °C)-99 5 °F (37 5 °C)] 99 1 °F (37 3 °C)  HR:  [68-73] 70  Resp:  [18] 18  BP: (114-134)/(69-74) 134/72  FiO2 (%):  [35] 35    Physical Exam  Constitutional:       General: She is not in acute distress  HENT:      Head: Normocephalic and atraumatic  Neck:      Comments: Trach collar in place  Pulmonary:      Effort: Pulmonary effort is normal       Breath sounds: No stridor  Musculoskeletal:         General: Normal range of motion  Skin:     General: Skin is dry  Neurological:      General: No focal deficit present  Mental Status: She is alert and oriented to person, place, and time  Mental status is at baseline  Psychiatric:         Mood and Affect: Mood normal          Behavior: Behavior normal          Thought Content: Thought content normal          Lab Results:   Results from last 7 days   Lab Units 07/25/22  0522   WBC Thousand/uL 15 82*   HEMOGLOBIN g/dL 8 7*   HEMATOCRIT % 28 7*   PLATELETS Thousands/uL 423*      Results from last 7 days   Lab Units 07/25/22  0522   POTASSIUM mmol/L 3 9   CHLORIDE mmol/L 98   CO2 mmol/L 27   BUN mg/dL 31*   CREATININE mg/dL 0 88   CALCIUM mg/dL 8 7   ALK PHOS U/L 96   ALT U/L 7   AST U/L 10*       Imaging Studies: I have personally reviewed pertinent reports  EKG, Pathology, and Other Studies: I have personally reviewed pertinent reports          Melissa Sweeney MD

## 2022-07-25 NOTE — ASSESSMENT & PLAN NOTE
· Patient with significant increase in WBC as of 7/23 compared to 3 days prior when white blood cell count was 12   · Increased to 21  · Repeat CBC with diff now 15k  · Patient with T-max of 99 7°    · Chest x-rays being performed secondary to chest tube do not show any evidence of overt pneumonia  · No other source of infection identified  · Likely reactive in the setting of chest tube insertions and pneumothorax

## 2022-07-25 NOTE — ASSESSMENT & PLAN NOTE
· On anticoagulation with Eliquis   · Caution for bleeding at chest tube site  · On amiodarone and Toprol which will be continued

## 2022-07-25 NOTE — PROGRESS NOTES
Progress Note - Pulmonary   Griselda Pouch CE Red 65 y o  female MRN: [id number]  Unit/Bed#: S -01 Encouter:[id number]    Assessment/Plan:    1  Right-sided pneumothorax   Unclear etiology   S/p placement of 10Fr CT on 7/20, upsized to 14Fr on 7/22   With significant pleuritic pain as well as pain at the CT insertion site requiring nerve block and pain management   CT removed yesterday, 7/24 after CXR without signs of pneumothorax   Patient endorses improvement in pain levels, continue pain management  2  Acute pulmonary insufficiency on chronic hypoxemic respiratory failure   Home O2 requirements of 10L trach collar 90%   Maintain O2 sats >89%   Patient able to clear secretions on her own   Aggressive pumonary toileting with frequent suctioning, trach care  3  Tracheostomy dependent   After MI requiring prolonged hospital stay Oct/Nov 2021 with Trach and PEG tube placement   Continue tracheostomy care   Frequent suctioning  4  Mild CHANTALE   Follows up with Dr Pati Somers in the setting of R-sided pneumothorax  5  Acute on chronic HFrEF   As evidenced by vascular congestion on CXR  [facility] consulted   Patient started on IV Bumex 2mg TID   Strict I/O, daily weights, BMP       Subjective:    Patient reports feeling better today, endorses improvement of pain at the site of chest tube insertion  She is still experiencing some pain with inspiration which has stayed at about the same level since yesterday  She is now status post chest tube removal and initiation of IV Bumex and reports improvement in shortness of breath  She is able to clear her secretions  Objective:    Vitals: Blood pressure 134/72, pulse 70, temperature 99 1 °F (37 3 °C), resp  rate 18, weight 78 kg (172 lb), SpO2 97 %  ,Body mass index is 25 4 kg/m²        Intake/Output Summary (Last 24 hours) at 7/25/2022 0996  Last data filed at 7/25/2022 0401  Gross per 24 hour   Intake 250 ml   Output 350 ml   Net -100 ml Invasive Devices  Report    Peripheral Intravenous Line  Duration           Peripheral IV 07/20/22 Right Antecubital 5 days    Peripheral IV 07/24/22 Right; Lower Forearm 1 day          Airway  Duration           Surgical Airway Shiley Fenestrated 145 days                Physical Exam:     Physical Exam  Vitals and nursing note reviewed  Constitutional:       General: She is not in acute distress  Appearance: Normal appearance  She is not ill-appearing or toxic-appearing  HENT:      Head: Normocephalic and atraumatic  Eyes:      General: No scleral icterus  Right eye: No discharge  Left eye: No discharge  Extraocular Movements: Extraocular movements intact  Conjunctiva/sclera: Conjunctivae normal    Neck:      Trachea: Tracheostomy present  Comments: Pale yellow sputum noted from tracheostomy site  Cardiovascular:      Rate and Rhythm: Normal rate and regular rhythm  Pulses: Normal pulses  Heart sounds: Normal heart sounds  No murmur heard  Pulmonary:      Effort: Pulmonary effort is normal  No respiratory distress  Breath sounds: Examination of the right-upper field reveals decreased breath sounds  Examination of the right-middle field reveals decreased breath sounds  Examination of the right-lower field reveals decreased breath sounds  Decreased breath sounds present  Comments: Ronchi-like sounds on auscultation bilaterally, suspect likely transmitted upper airway sounds  Abdominal:      General: There is no distension  Tenderness: There is no abdominal tenderness  Musculoskeletal:      Cervical back: Normal range of motion and neck supple  Right lower leg: No edema  Left lower leg: No edema  Skin:     General: Skin is warm and dry  Coloration: Skin is not jaundiced or pale  Neurological:      Mental Status: She is oriented to person, place, and time     Psychiatric:         Mood and Affect: Mood normal          Behavior: Behavior normal          Thought Content: Thought content normal          Judgment: Judgment normal          Labs: I have personally reviewed pertinent lab results  , ABG: No results found for: PHART, WTN6QCQ, PO2ART, SKX3VWJ, S0ZYVFUI, BEART, SOURCE, BNP:   Lab Results   Component Value Date     (H) 07/24/2022   , CBC:   Lab Results   Component Value Date    WBC 15 82 (H) 07/25/2022    HGB 8 7 (L) 07/25/2022    HCT 28 7 (L) 07/25/2022    MCV 76 (L) 07/25/2022     (H) 07/25/2022    MCH 23 0 (L) 07/25/2022    MCHC 30 3 (L) 07/25/2022    RDW 17 2 (H) 07/25/2022    MPV 9 9 07/25/2022    NRBC 0 07/25/2022   , CMP:   Lab Results   Component Value Date    SODIUM 135 07/25/2022    K 3 9 07/25/2022    CL 98 07/25/2022    CO2 27 07/25/2022    BUN 31 (H) 07/25/2022    CREATININE 0 88 07/25/2022    CALCIUM 8 7 07/25/2022    AST 10 (L) 07/25/2022    ALT 7 07/25/2022    ALKPHOS 96 07/25/2022    EGFR 73 07/25/2022   , PT/INR: No results found for: PT, INR, Troponin: No results found for: TROPONINI        Imaging and other studies: I have personally reviewed pertinent reports     and I have personally reviewed pertinent films in PACS

## 2022-07-25 NOTE — ASSESSMENT & PLAN NOTE
· STEMI 10/22/2021 s/p PATRICA mid circumflex OM1  OM2 was ballooned but not stented  · Pulseless VT 10/27/21 - repeat LHC 90% mid circumflex stenosis, but could not be intervened on  · VT 10/28/21 LHC:  found to have apical LAD complete occlusion was felt to be small to be intervened    · Cardiac carrest 1/1/22 - NO cardiac cath at [facility] felt to be hypoxic vs  VT induced  · On beta-blocker, Brilinta and Lipitor  · Resumed losartan however at half dose 7/23 to see if BP tolerates and resumed spironolactone 7/23

## 2022-07-25 NOTE — ASSESSMENT & PLAN NOTE
· STEMI 10/22/21 - PATRICA to mid circumflex  · VT arrest 10/26   · Polymorphic VT x 1 [id number]/28/21  · ICD not placed given risks felt to outweigh benefits with cognitive function and risk of infection at that that time  · Cardiac arrest 1/1/22 - likely VT related - went to [facility]  · No ICD given lung infection and on IV ABX x 4 weeks  · Canceled 2 EP appts since that time and thus no ICD in place - still wears LifeVest  · EF seems to have always been 40% or higher

## 2022-07-25 NOTE — ASSESSMENT & PLAN NOTE
· Trach placed in the context of cardiac arrest/prolonged ventilator dependent state November 2021  · Decannulated at [facility] 12/11/21  · Patient requires frequent tracheostomy changes and suctioning  · Per discussion with speech therapy,  video barium swallow was done for sense of food getting stuck   Appropriate for regular diet  · On trach collar O2 with humidification  · Seems to have more thick secretions noted today--check procal

## 2022-07-25 NOTE — ASSESSMENT & PLAN NOTE
-Patient endorses continued anxiety despite SSRI and BID PRN ativan 0 5 mg  -Will increase ativan to TID PRN for now but anticipate patient would likely benefit from better baseline control of anxiety, will consider adding buspar in the future in hopes of down-titrating ativan to cessation  -Multiple triggers for anxiety, including constant fear related to patient's health and uncertainty regarding need for possible life-long trache, as well as unaddressed post-traumatic emotions related to having coded several times during past hospitalization and being resuscitated   -Declines referral for counseling at this time, will continue to encourage at each visit

## 2022-07-25 NOTE — ASSESSMENT & PLAN NOTE
-At her baseline 10L continuous supplemental O2  -Has 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 6/21/22  -Encouraged patient to call and schedule appt with Pulmonology, contact info given again

## 2022-07-25 NOTE — ASSESSMENT & PLAN NOTE
Lab Results   Component Value Date    HGBA1C 12 7 (H) 05/22/2022     Recent Labs     07/24/22  1200 07/24/22  1620 07/24/22 2052 07/25/22  0743   POCGLU 153* 190* 107 125     Blood Sugar Average: Last 72 hrs:  · (P) 525 [id number]   · Very poorly controlled based on A1c but current blood sugars are essentially at goal on current regimen of basal bolus insulin  · ADA diet

## 2022-07-26 ENCOUNTER — APPOINTMENT (INPATIENT)
Dept: RADIOLOGY | Facility: HOSPITAL | Age: 57
DRG: 199 | End: 2022-07-26
Payer: COMMERCIAL

## 2022-07-26 LAB
ANION GAP SERPL CALCULATED.3IONS-SCNC: 11 MMOL/L (ref 4–13)
BASOPHILS # BLD AUTO: 0.04 THOUSANDS/ΜL (ref 0–0.1)
BASOPHILS NFR BLD AUTO: 0 % (ref 0–1)
BUN SERPL-MCNC: 33 MG/DL (ref 5–25)
CALCIUM SERPL-MCNC: 9.1 MG/DL (ref 8.4–10.2)
CHLORIDE SERPL-SCNC: 93 MMOL/L (ref 96–108)
CO2 SERPL-SCNC: 28 MMOL/L (ref 21–32)
CREAT SERPL-MCNC: 1.18 MG/DL (ref 0.6–1.3)
EOSINOPHIL # BLD AUTO: 0.19 THOUSAND/ΜL (ref 0–0.61)
EOSINOPHIL NFR BLD AUTO: 2 % (ref 0–6)
ERYTHROCYTE [DISTWIDTH] IN BLOOD BY AUTOMATED COUNT: 17.3 % (ref 11.6–15.1)
GFR SERPL CREATININE-BSD FRML MDRD: 51 ML/MIN/1.73SQ M
GLUCOSE SERPL-MCNC: 161 MG/DL (ref 65–140)
GLUCOSE SERPL-MCNC: 181 MG/DL (ref 65–140)
GLUCOSE SERPL-MCNC: 214 MG/DL (ref 65–140)
GLUCOSE SERPL-MCNC: 223 MG/DL (ref 65–140)
HCT VFR BLD AUTO: 32.1 % (ref 34.8–46.1)
HGB BLD-MCNC: 9.7 G/DL (ref 11.5–15.4)
IMM GRANULOCYTES # BLD AUTO: 0.1 THOUSAND/UL (ref 0–0.2)
IMM GRANULOCYTES NFR BLD AUTO: 1 % (ref 0–2)
LYMPHOCYTES # BLD AUTO: 1.22 THOUSANDS/ΜL (ref 0.6–4.47)
LYMPHOCYTES NFR BLD AUTO: 10 % (ref 14–44)
MCH RBC QN AUTO: 23 PG (ref 26.8–34.3)
MCHC RBC AUTO-ENTMCNC: 30.2 G/DL (ref 31.4–37.4)
MCV RBC AUTO: 76 FL (ref 82–98)
MONOCYTES # BLD AUTO: 1.12 THOUSAND/ΜL (ref 0.17–1.22)
MONOCYTES NFR BLD AUTO: 9 % (ref 4–12)
NEUTROPHILS # BLD AUTO: 10.03 THOUSANDS/ΜL (ref 1.85–7.62)
NEUTS SEG NFR BLD AUTO: 78 % (ref 43–75)
NRBC BLD AUTO-RTO: 0 /100 WBCS
PLATELET # BLD AUTO: 554 THOUSANDS/UL (ref 149–390)
PMV BLD AUTO: 9.8 FL (ref 8.9–12.7)
POTASSIUM SERPL-SCNC: 4 MMOL/L (ref 3.5–5.3)
PROCALCITONIN SERPL-MCNC: 0.96 NG/ML
RBC # BLD AUTO: 4.21 MILLION/UL (ref 3.81–5.12)
SODIUM SERPL-SCNC: 132 MMOL/L (ref 135–147)
WBC # BLD AUTO: 12.7 THOUSAND/UL (ref 4.31–10.16)

## 2022-07-26 PROCEDURE — 87081 CULTURE SCREEN ONLY: CPT | Performed by: PHYSICIAN ASSISTANT

## 2022-07-26 PROCEDURE — 87077 CULTURE AEROBIC IDENTIFY: CPT | Performed by: PHYSICIAN ASSISTANT

## 2022-07-26 PROCEDURE — 94760 N-INVAS EAR/PLS OXIMETRY 1: CPT

## 2022-07-26 PROCEDURE — 84145 PROCALCITONIN (PCT): CPT | Performed by: PHYSICIAN ASSISTANT

## 2022-07-26 PROCEDURE — 71045 X-RAY EXAM CHEST 1 VIEW: CPT

## 2022-07-26 PROCEDURE — 82948 REAGENT STRIP/BLOOD GLUCOSE: CPT

## 2022-07-26 PROCEDURE — 80048 BASIC METABOLIC PNL TOTAL CA: CPT | Performed by: PHYSICIAN ASSISTANT

## 2022-07-26 PROCEDURE — 87186 SC STD MICRODIL/AGAR DIL: CPT | Performed by: PHYSICIAN ASSISTANT

## 2022-07-26 PROCEDURE — 94640 AIRWAY INHALATION TREATMENT: CPT

## 2022-07-26 PROCEDURE — 87205 SMEAR GRAM STAIN: CPT | Performed by: PHYSICIAN ASSISTANT

## 2022-07-26 PROCEDURE — 99232 SBSQ HOSP IP/OBS MODERATE 35: CPT | Performed by: INTERNAL MEDICINE

## 2022-07-26 PROCEDURE — 85025 COMPLETE CBC W/AUTO DIFF WBC: CPT | Performed by: PHYSICIAN ASSISTANT

## 2022-07-26 PROCEDURE — 87070 CULTURE OTHR SPECIMN AEROBIC: CPT | Performed by: PHYSICIAN ASSISTANT

## 2022-07-26 PROCEDURE — 99232 SBSQ HOSP IP/OBS MODERATE 35: CPT | Performed by: PHYSICIAN ASSISTANT

## 2022-07-26 RX ORDER — VANCOMYCIN HYDROCHLORIDE 1 G/200ML
12.5 INJECTION, SOLUTION INTRAVENOUS EVERY 12 HOURS
Status: DISCONTINUED | OUTPATIENT
Start: 2022-07-26 | End: 2022-07-27

## 2022-07-26 RX ORDER — CEFEPIME HYDROCHLORIDE 1 G/50ML
1000 INJECTION, SOLUTION INTRAVENOUS EVERY 12 HOURS
Status: DISCONTINUED | OUTPATIENT
Start: 2022-07-26 | End: 2022-07-28

## 2022-07-26 RX ADMIN — GUAIFENESIN 1200 MG: 600 TABLET ORAL at 08:22

## 2022-07-26 RX ADMIN — INSULIN LISPRO 4 UNITS: 100 INJECTION, SOLUTION INTRAVENOUS; SUBCUTANEOUS at 13:47

## 2022-07-26 RX ADMIN — METHOCARBAMOL 500 MG: 500 TABLET ORAL at 05:02

## 2022-07-26 RX ADMIN — INSULIN LISPRO 18 UNITS: 100 INJECTION, SOLUTION INTRAVENOUS; SUBCUTANEOUS at 17:37

## 2022-07-26 RX ADMIN — APIXABAN 5 MG: 5 TABLET, FILM COATED ORAL at 08:21

## 2022-07-26 RX ADMIN — IPRATROPIUM BROMIDE 0.5 MG: 0.5 SOLUTION RESPIRATORY (INHALATION) at 15:16

## 2022-07-26 RX ADMIN — IPRATROPIUM BROMIDE 0.5 MG: 0.5 SOLUTION RESPIRATORY (INHALATION) at 09:30

## 2022-07-26 RX ADMIN — DIAZEPAM 5 MG: 5 TABLET ORAL at 00:32

## 2022-07-26 RX ADMIN — LEVALBUTEROL HYDROCHLORIDE 1.25 MG: 1.25 SOLUTION, CONCENTRATE RESPIRATORY (INHALATION) at 20:46

## 2022-07-26 RX ADMIN — BUMETANIDE 2 MG: 0.25 INJECTION INTRAMUSCULAR; INTRAVENOUS at 05:03

## 2022-07-26 RX ADMIN — APIXABAN 5 MG: 5 TABLET, FILM COATED ORAL at 17:32

## 2022-07-26 RX ADMIN — METHOCARBAMOL 500 MG: 500 TABLET ORAL at 23:32

## 2022-07-26 RX ADMIN — TICAGRELOR 90 MG: 90 TABLET ORAL at 17:32

## 2022-07-26 RX ADMIN — ESCITALOPRAM OXALATE 10 MG: 10 TABLET ORAL at 08:21

## 2022-07-26 RX ADMIN — VANCOMYCIN HYDROCHLORIDE 1000 MG: 1 INJECTION, SOLUTION INTRAVENOUS at 22:42

## 2022-07-26 RX ADMIN — METHOCARBAMOL 500 MG: 500 TABLET ORAL at 17:33

## 2022-07-26 RX ADMIN — HYDROMORPHONE HYDROCHLORIDE 2 MG: 2 TABLET ORAL at 08:20

## 2022-07-26 RX ADMIN — HYDROMORPHONE HYDROCHLORIDE 0.5 MG: 1 INJECTION, SOLUTION INTRAMUSCULAR; INTRAVENOUS; SUBCUTANEOUS at 11:43

## 2022-07-26 RX ADMIN — INSULIN LISPRO 2 UNITS: 100 INJECTION, SOLUTION INTRAVENOUS; SUBCUTANEOUS at 17:34

## 2022-07-26 RX ADMIN — CEFEPIME HYDROCHLORIDE 1000 MG: 1 INJECTION, SOLUTION INTRAVENOUS at 21:45

## 2022-07-26 RX ADMIN — IPRATROPIUM BROMIDE 0.5 MG: 0.5 SOLUTION RESPIRATORY (INHALATION) at 20:46

## 2022-07-26 RX ADMIN — INSULIN LISPRO 4 UNITS: 100 INJECTION, SOLUTION INTRAVENOUS; SUBCUTANEOUS at 21:44

## 2022-07-26 RX ADMIN — CEFEPIME HYDROCHLORIDE 1000 MG: 1 INJECTION, SOLUTION INTRAVENOUS at 10:49

## 2022-07-26 RX ADMIN — GUAIFENESIN 1200 MG: 600 TABLET ORAL at 21:49

## 2022-07-26 RX ADMIN — LEVALBUTEROL HYDROCHLORIDE 1.25 MG: 1.25 SOLUTION, CONCENTRATE RESPIRATORY (INHALATION) at 09:29

## 2022-07-26 RX ADMIN — PANTOPRAZOLE SODIUM 40 MG: 40 TABLET, DELAYED RELEASE ORAL at 05:02

## 2022-07-26 RX ADMIN — VANCOMYCIN HYDROCHLORIDE 1500 MG: 5 INJECTION, POWDER, LYOPHILIZED, FOR SOLUTION INTRAVENOUS at 11:50

## 2022-07-26 RX ADMIN — PREGABALIN 75 MG: 75 CAPSULE ORAL at 08:21

## 2022-07-26 RX ADMIN — AMIODARONE HYDROCHLORIDE 100 MG: 200 TABLET ORAL at 08:22

## 2022-07-26 RX ADMIN — LOSARTAN POTASSIUM 50 MG: 50 TABLET, FILM COATED ORAL at 08:23

## 2022-07-26 RX ADMIN — DESMOPRESSIN ACETATE 40 MG: 0.2 TABLET ORAL at 17:33

## 2022-07-26 RX ADMIN — HYDROMORPHONE HYDROCHLORIDE 2 MG: 2 TABLET ORAL at 19:36

## 2022-07-26 RX ADMIN — LEVALBUTEROL HYDROCHLORIDE 1.25 MG: 1.25 SOLUTION, CONCENTRATE RESPIRATORY (INHALATION) at 15:16

## 2022-07-26 RX ADMIN — METHOCARBAMOL 500 MG: 500 TABLET ORAL at 13:44

## 2022-07-26 RX ADMIN — METOPROLOL SUCCINATE 50 MG: 50 TABLET, EXTENDED RELEASE ORAL at 17:32

## 2022-07-26 RX ADMIN — TICAGRELOR 90 MG: 90 TABLET ORAL at 05:04

## 2022-07-26 NOTE — ASSESSMENT & PLAN NOTE
· Trach placed in the context of cardiac arrest/prolonged ventilator dependent state November 2021  · Decannulated at [facility] 12/11/21  · Patient requires frequent tracheostomy changes and suctioning  · Per discussion with speech therapy,  video barium swallow was done for sense of food getting stuck   Appropriate for regular diet  · On trach collar O2 with humidification

## 2022-07-26 NOTE — ASSESSMENT & PLAN NOTE
· Potassium currently 4  · Resumed spironolactone and ARB 7/23  · Resumed home dose of liquid potassium 40 meq PO BID--hold for now while holding bumex this am  · Goal for K >4 given history of VT cardiac arrest  · K 6 1 on 7/20/22  · Was withheld from supplemental potassium, spironolactone and ARB and dose of Kayexalate was given

## 2022-07-26 NOTE — ASSESSMENT & PLAN NOTE
Wt Readings from Last 3 Encounters:   07/26/22 77 1 kg (170 lb)   07/19/22 78 2 kg (172 lb 6 4 oz)   06/29/22 81 7 kg (180 lb 3 2 oz)   · EF noted to be 50% on last echocardiogram 2/22  · Admitted to [facility] for CHF exac on 7/15 - given 80 mg IV lasix and then placed back on bumex 2 mg PO BID  · Developed MODESTO and thus bumex dose reduced - got 2 mg on 7/19, no bumex on on 7/20, 1 mg on 7/21, 2 mg on 7/23, 3 mg on 7/23 (home dose had been bumex 2 mg PO BID)  · 7/24 AM with increasing SOB, oxygen requiements - given 1 mg IV bumex with 4-5 episodes of urine output (amt not recorded)   · CXR showed mild vascular congestion  · Suspected iatrogenic volume overload in the setting of reduced/withheld diuretic dosing  · Consulted cardiology, appreciate their input  Patient received several doses IV Bumex    Like we euvolemic at this time, hold further IV doses

## 2022-07-26 NOTE — PROGRESS NOTES
Vancomycin Assessment    Kelli Daly is a 65 y o  female who is currently receiving vancomycin 1000 mg every 12 hours for Pneumonia     Relevant clinical data and objective history reviewed:  Creatinine   Date Value Ref Range Status   07/26/2022 1 18 0 60 - 1 30 mg/dL Final     Comment:     Standardized to IDMS reference method   07/25/2022 0 88 0 60 - 1 30 mg/dL Final     Comment:     Standardized to IDMS reference method   07/24/2022 1 03 0 60 - 1 30 mg/dL Final     Comment:     Standardized to IDMS reference method   07/19/2015 0 71 0 60 - 1 30 mg/dL Final     Comment:     Standardized to IDMS reference method   07/08/2015 0 59 (L) 0 60 - 1 30 mg/dL Final     Comment:     Standardized to IDMS reference method   03/28/2015 0 44 (L) 0 60 - 1 30 mg/dL Final     Comment:     Standardized to IDMS reference method     Vancomycin Rm   Date Value Ref Range Status   11/04/2021 14 7 ug/mL Final     /74 (BP Location: Right arm)   Pulse 74   Temp 98 9 °F (37 2 °C) (Oral)   Resp 18   Wt 77 1 kg (170 lb)   SpO2 97%   BMI 25 10 kg/m²   I/O last 3 completed shifts: In: 120 [P O :120]  Out: 3600 [Urine:3600]  Lab Results   Component Value Date/Time    BUN 33 (H) 07/26/2022 05:07 AM    BUN 16 07/19/2015 03:40 AM    WBC 12 70 (H) 07/26/2022 05:07 AM    WBC 10 66 (H) 07/19/2015 03:40 AM    HGB 9 7 (L) 07/26/2022 05:07 AM    HGB 13 3 07/19/2015 03:40 AM    HCT 32 1 (L) 07/26/2022 05:07 AM    HCT 40 2 07/19/2015 03:40 AM    MCV 76 (L) 07/26/2022 05:07 AM    MCV 84 07/19/2015 03:40 AM     (H) 07/26/2022 05:07 AM     07/19/2015 03:40 AM     Temp Readings from Last 3 Encounters:   07/26/22 98 9 °F (37 2 °C) (Oral)   07/19/22 98 °F (36 7 °C) (Tympanic)   06/29/22 (!) 97 °F (36 1 °C) (Tympanic)     Vancomycin Days of Therapy: 1    Assessment/Plan  The patient is currently on vancomycin utilizing scheduled dosing based on actual body weight    Baseline risks associated with therapy include: concomitant nephrotoxic medications  The patient is currently receiving 1000 mg every 12 hours and is clinically appropriate and dose will be continued  Pharmacy will also follow closely for s/sx of nephrotoxicity, infusion reactions, and appropriateness of therapy  BMP and CBC will be ordered per protocol  Plan for trough as patient approaches steady state, prior to the 4th  dose at approximately 2030 on 7/27  Due to infection severity, will target a trough of 15-20 (appropriate for most indications)   Pharmacy will continue to follow the patients culture results and clinical progress daily      Pema Tomas, Pharmacist

## 2022-07-26 NOTE — ASSESSMENT & PLAN NOTE
· STEMI 10/22/2021 s/p PATRICA mid circumflex OM1  OM2 was ballooned but not stented  · Pulseless VT 10/27/21 - repeat LHC 90% mid circumflex stenosis, but could not be intervened on  · VT 10/28/21 LHC:  found to have apical LAD complete occlusion was felt to be small to be intervened    · Cardiac carrest 1/1/22 - NO cardiac cath at [facility] felt to be hypoxic vs  VT induced  · On beta-blocker, Brilinta and Lipitor  · Resumed losartan however at half dose 7/23 to see if BP tolerates and resumed spironolactone 7/23

## 2022-07-26 NOTE — PLAN OF CARE
Problem: Potential for Falls  Goal: Patient will remain free of falls  Description: INTERVENTIONS:  - Educate patient/family on patient safety including physical limitations  - Instruct patient to call for assistance with activity   - Consult OT/PT to assist with strengthening/mobility   - Keep Call bell within reach  - Keep bed low and locked with side rails adjusted as appropriate  - Keep care items and personal belongings within reach  - Initiate and maintain comfort rounds  - Make Fall Risk Sign visible to staff  - Offer Toileting every 2 Hours, in advance of need  - Obtain necessary fall risk management equipment  - Apply yellow socks and bracelet for high fall risk patients  - Consider moving patient to room near nurses station  Outcome: Progressing     Problem: Nutrition/Hydration-ADULT  Goal: Nutrient/Hydration intake appropriate for improving, restoring or maintaining nutritional needs  Description: Monitor and assess patient's nutrition/hydration status for malnutrition  Collaborate with interdisciplinary team and initiate plan and interventions as ordered  Monitor patient's weight and dietary intake as ordered or per policy  Utilize nutrition screening tool and intervene as necessary  Determine patient's food preferences and provide high-protein, high-caloric foods as appropriate       INTERVENTIONS:  - Monitor oral intake, urinary output, labs, and treatment plans  - Assess nutrition and hydration status and recommend course of action  - Evaluate amount of meals eaten  - Assist patient with eating if necessary   - Allow adequate time for meals  - Recommend/ encourage appropriate diets, oral nutritional supplements, and vitamin/mineral supplements  - Order, calculate, and assess calorie counts as needed  - Recommend, monitor, and adjust tube feedings and TPN/PPN based on assessed needs  - Assess need for intravenous fluids  - Provide specific nutrition/hydration education as appropriate  - Include patient/family/caregiver in decisions related to nutrition  Outcome: Progressing     Problem: PAIN - ADULT  Goal: Verbalizes/displays adequate comfort level or baseline comfort level  Description: Interventions:  - Encourage patient to monitor pain and request assistance  - Assess pain using appropriate pain scale  - Administer analgesics based on type and severity of pain and evaluate response  - Implement non-pharmacological measures as appropriate and evaluate response  - Consider cultural and social influences on pain and pain management  - Notify physician/advanced practitioner if interventions unsuccessful or patient reports new pain  Outcome: Progressing     Problem: INFECTION - ADULT  Goal: Absence or prevention of progression during hospitalization  Description: INTERVENTIONS:  - Assess and monitor for signs and symptoms of infection  - Monitor lab/diagnostic results  - Monitor all insertion sites, i e  indwelling lines, tubes, and drains  - Monitor endotracheal if appropriate and nasal secretions for changes in amount and color  - [place] appropriate cooling/warming therapies per order  - Administer medications as ordered  - Instruct and encourage patient and family to use good hand hygiene technique  - Identify and instruct in appropriate isolation precautions for identified infection/condition  Outcome: Progressing  Goal: Absence of fever/infection during neutropenic period  Description: INTERVENTIONS:  - Monitor WBC    Outcome: Progressing     Problem: SAFETY ADULT  Goal: Patient will remain free of falls  Description: INTERVENTIONS:  - Educate patient/family on patient safety including physical limitations  - Instruct patient to call for assistance with activity   - Consult OT/PT to assist with strengthening/mobility   - Keep Call bell within reach  - Keep bed low and locked with side rails adjusted as appropriate  - Keep care items and personal belongings within reach  - Initiate and maintain comfort rounds  - Make Fall Risk Sign visible to staff  - Offer Toileting every 2 Hours, in advance of need  - Obtain necessary fall risk management equipment  - Apply yellow socks and bracelet for high fall risk patients  - Consider moving patient to room near nurses station  Outcome: Progressing  Goal: Maintain or return to baseline ADL function  Description: INTERVENTIONS:  -  Assess patient's ability to carry out ADLs; assess patient's baseline for ADL function and identify physical deficits which impact ability to perform ADLs (bathing, care of mouth/teeth, toileting, grooming, dressing, etc )  - Assess/evaluate cause of self-care deficits   - Assess range of motion  - Assess patient's mobility; develop plan if impaired  - Assess patient's need for assistive devices and provide as appropriate  - Encourage maximum independence but intervene and supervise when necessary  - Involve family in performance of ADLs  - Assess for home care needs following discharge   - Consider OT consult to assist with ADL evaluation and planning for discharge  - Provide patient education as appropriate  Outcome: Progressing  Goal: Maintains/Returns to pre admission functional level  Description: INTERVENTIONS:  - Perform BMAT or MOVE assessment daily    - Set and communicate daily mobility goal to care team and patient/family/caregiver  - Collaborate with rehabilitation services on mobility goals if consulted  - Perform Range of Motion 4 times a day  - Reposition patient every 2 hours    - Dangle patient 2 times a day  - Stand patient 2 times a day  - Ambulate patient 2 times a day  - Out of bed to chair 2 times a day   - Out of bed for meals 2 times a day  - Out of bed for toileting  - Record patient progress and toleration of activity level   Outcome: Progressing     Problem: DISCHARGE PLANNING  Goal: Discharge to home or other facility with appropriate resources  Description: INTERVENTIONS:  - Identify barriers to discharge w/patient and caregiver  - Arrange for needed discharge resources and transportation as appropriate  - Identify discharge learning needs (meds, wound care, etc )  - Arrange for interpretive services to assist at discharge as needed  - Refer to Case Management Department for coordinating discharge planning if the patient needs post-hospital services based on physician/advanced practitioner order or complex needs related to functional status, cognitive ability, or social support system  Outcome: Progressing     Problem: Knowledge Deficit  Goal: Patient/family/caregiver demonstrates understanding of disease process, treatment plan, medications, and discharge instructions  Description: Complete learning assessment and assess knowledge base    Interventions:  - Provide teaching at level of understanding  - Provide teaching via preferred learning methods  Outcome: Progressing     Problem: MOBILITY - ADULT  Goal: Maintain or return to baseline ADL function  Description: INTERVENTIONS:  -  Assess patient's ability to carry out ADLs; assess patient's baseline for ADL function and identify physical deficits which impact ability to perform ADLs (bathing, care of mouth/teeth, toileting, grooming, dressing, etc )  - Assess/evaluate cause of self-care deficits   - Assess range of motion  - Assess patient's mobility; develop plan if impaired  - Assess patient's need for assistive devices and provide as appropriate  - Encourage maximum independence but intervene and supervise when necessary  - Involve family in performance of ADLs  - Assess for home care needs following discharge   - Consider OT consult to assist with ADL evaluation and planning for discharge  - Provide patient education as appropriate  Outcome: Progressing  Goal: Maintains/Returns to pre admission functional level  Description: INTERVENTIONS:  - Perform BMAT or MOVE assessment daily    - Set and communicate daily mobility goal to care team and patient/family/caregiver  - Collaborate with rehabilitation services on mobility goals if consulted  - Perform Range of Motion 2 times a day  - Reposition patient every 2 hours    - Dangle patient 2 times a day  - Stand patient 2 times a day  - Ambulate patient 2 times a day  - Out of bed to chair 2 times a day   - Out of bed for meals 2 times a day  - Out of bed for toileting  - Record patient progress and toleration of activity level   Outcome: Progressing     Problem: Prexisting or High Potential for Compromised Skin Integrity  Goal: Skin integrity is maintained or improved  Description: INTERVENTIONS:  - Identify patients at risk for skin breakdown  - Assess and monitor skin integrity  - Assess and monitor nutrition and hydration status  - Monitor labs   - Assess for incontinence   - Turn and reposition patient  - Assist with mobility/ambulation  - Relieve pressure over bony prominences  - Avoid friction and shearing  - Provide appropriate hygiene as needed including keeping skin clean and dry  - Evaluate need for skin moisturizer/barrier cream  - Collaborate with interdisciplinary team   - Patient/family teaching  - Consider wound care consult   Outcome: Progressing

## 2022-07-26 NOTE — PROGRESS NOTES
General Cardiology   Progress Note -  Team One   Candi Marrufo 65 y o  female MRN: [id number]    Unit/Bed#: S -01 Encounter: [id number]    Assessment  1  Acute on Chronic HFmEF  2  ICM w/improved EF to 50% (previously 40-45% in 10/2021)  -On PE -- appears euvolemic  -BNP level 165  -Chest x-ray 7/24/22 - pulmonary vascular congestion  -TTE 2/2022 - LVEF 50%, moderate hypokinesis of the inferior in the basal anterior lateral wall, moderate concentric hypertrophy, LA/RA mildly dilated, RV normal size/systolic function, mild TR  -GDM T - losartan 50 mg daily, spironolactone 100 mg daily, and metoprolol succinate 50 mg b i d   -Previously on oral Bumex 2 mg b i d as an outpatient (recently switched from furosemide 60 mg b i d  on 7/5)   -Currently on IV Bumex 2 mg t i d   -24 hour I&O balance; -3 1 L; overall -5 4 L  -Weights; baseline/dry weights appear to be in the low to mid 180s  Standing scale weight on 7/26 - 170 lb   3  CAD / lateral wall STEMI (10/22/2021) s/p PCI/PATRICA x 1 mid LCX into OM1, OM2 underwent balloon  -Denies anginal symptoms   -Last LHC on 10/28/21 showed patent OM1 stent, jailed mLCx, and new distal LAD occlusion too small for intervention     -On Brilinta 90 mg q12h, Eliquis 5 mg b i d , high-intensity statin, and BB  4  Paroxysmal atrial fibrillation  5  Hx of VT cardiac arrest  -Maintaining NSR   -On amiodarone 100 mg daily, and metoprolol succinate 50 mg b i d   -Wearing life vest at home  Pending EP follow-up as an outpatient for ICD consideration  6  Spontaneous right-sided pneumothorax   -Pulmonary following  -Right-sided chest tube removed on 7/24   7  Essential hypertension  -Average /72, last recorded 132/74, HR 74  -BP regimen - losartan 50 mg daily, metoprolol succinate 50 mg b i d , and spironolactone 100 mg daily    8  Poorly controlled DM  -HGB A1c 12 7 - 5/2022      Plan  -Appears euvolemic on PE and repeat x-ray imaging this a m  not suggestive of any overt volume overload  DC further IV diuretics  Can restart oral Bumex 2 mg b i d this afternoon and continue spironolactone 100 mg daily    -Empiric IV antibiotics initiated this a m by the primary team given increased WOB/exertional dyspnea, sputum production, and elevated procalcitonin level; no fever or worsening leukocytosis  F/u pulmonary recommendations; needs aggressive pulmonary hygiene    -Continue losartan 50 mg daily  -Continue metoprolol succinate 50 mg b i d and amiodarone 100 mg daily  No significant arrhythmias identified on telemetry monitoring  Continue to monitor while hospitalized  -Continue strict I&Os, daily standing weights, and 2 g NA +diet 1 8 L FR    -Re-application of Life Vest upon discharge   -Follow-up with Cardiology/EP service upon discharge for ICD consideration    Subjective  Review of Systems   Constitutional: Positive for malaise/fatigue  Negative for chills and fever  Eyes: Negative for visual disturbance  Cardiovascular: Positive for dyspnea on exertion  Negative for chest pain, leg swelling, orthopnea and palpitations  Respiratory: Positive for cough, shortness of breath and sputum production  Gastrointestinal: Negative for abdominal pain  Neurological: Negative for dizziness, headaches and light-headedness  Objective:   Physical Exam  Vitals and nursing note reviewed  Constitutional:       General: She is not in acute distress  Appearance: She is not diaphoretic  HENT:      Head: Normocephalic and atraumatic  Mouth/Throat:      Mouth: Mucous membranes are moist    Eyes:      General: No scleral icterus  Neck:      Comments: No JVD  Cardiovascular:      Rate and Rhythm: Normal rate and regular rhythm  Pulses: Normal pulses  Heart sounds: Normal heart sounds  Pulmonary:      Effort: Pulmonary effort is normal       Breath sounds: Rhonchi present  Comments: coarse breath sounds bilaterally  Abdominal:      Palpations: Abdomen is soft  Tenderness: There is no abdominal tenderness  Musculoskeletal:      Cervical back: Neck supple  Right lower leg: No edema  Left lower leg: No edema  Skin:     General: Skin is warm and dry  Capillary Refill: Capillary refill takes less than 2 seconds  Neurological:      General: No focal deficit present  Mental Status: She is alert  Psychiatric:         Mood and Affect: Mood normal          Vitals: Blood pressure 132/74, pulse 74, temperature 98 9 °F (37 2 °C), temperature source Oral, resp  rate 18, weight 77 1 kg (170 lb), SpO2 97 %  ,     Body mass index is 25 1 kg/m²  ,   Systolic (74LOH), GUH:576 , Min:109 , HKF:667     Diastolic (27WMK), VPN:23, Min:66, Max:78      Intake/Output Summary (Last 24 hours) at 7/26/2022 0915  Last data filed at 7/26/2022 0501  Gross per 24 hour   Intake 120 ml   Output 3250 ml   Net -3130 ml     Weight (last 2 days)     Date/Time Weight    07/26/22 0600 77 1 (170)    07/25/22 1213 78 9 (174)          LABORATORY RESULTS      CBC with diff:   Results from last 7 days   Lab Units 07/26/22  0507 07/25/22  0522 07/24/22  1213 07/23/22  0502 07/20/22  0430 07/19/22  1359   WBC Thousand/uL 12 70* 15 82* 21 17* 21 72* 12 03* 14 59*   HEMOGLOBIN g/dL 9 7* 8 7* 9 0* 9 4* 9 9* 10 1*   HEMATOCRIT % 32 1* 28 7* 28 9* 31 0* 32 9* 34 4*   MCV fL 76* 76* 76* 78* 78* 79*   PLATELETS Thousands/uL 554* 423* 449* 433* 542* 592*   MCH pg 23 0* 23 0* 23 6* 23 6* 23 4* 23 2*   MCHC g/dL 30 2* 30 3* 31 1* 30 3* 30 1* 29 4*   RDW % 17 3* 17 2* 17 2* 17 0* 16 6* 16 5*   MPV fL 9 8 9 9 10 2 9 6 9 6 9 6   NRBC AUTO /100 WBCs 0 0 0 0 0 0       CMP:  Results from last 7 days   Lab Units 07/26/22  0507 07/25/22  0522 07/24/22  0508 07/23/22  0502 07/22/22  0451 07/21/22  0542 07/20/22  1245   POTASSIUM mmol/L 4 0 3 9 3 2* 3 7 4 1 4 5 4 8   CHLORIDE mmol/L 93* 98 94* 96 98 96 95*   CO2 mmol/L 28 27 30 31 27 31 30   BUN mg/dL 33* 31* 36* 38* 45* 42* 44*   CREATININE mg/dL 1 18 0 88 1  03 1 15 1 24 1 26 1 37*   CALCIUM mg/dL 9 1 8 7 9 3 8 8 8 8 9 2 9 2   AST U/L  --  10*  --   --   --   --   --    ALT U/L  --  7  --   --   --   --   --    ALK PHOS U/L  --  96  --   --   --   --   --    EGFR ml/min/1 73sq m 51 73 60 53 48 47 43       BMP:  Results from last 7 days   Lab Units 07/26/22  0507 07/25/22  0522 07/24/22  0508 07/23/22  0502 07/22/22  0451 07/21/22  0542 07/20/22  1245   POTASSIUM mmol/L 4 0 3 9 3 2* 3 7 4 1 4 5 4 8   CHLORIDE mmol/L 93* 98 94* 96 98 96 95*   CO2 mmol/L 28 27 30 31 27 31 30   BUN mg/dL 33* 31* 36* 38* 45* 42* 44*   CREATININE mg/dL 1 18 0 88 1 03 1 15 1 24 1 26 1 37*   CALCIUM mg/dL 9 1 8 7 9 3 8 8 8 8 9 2 9 2       Lab Results   Component Value Date    NTBNP 4,179 (H) 06/27/2022    NTBNP 4,406 (H) 06/06/2022    NTBNP 3,158 (H) 10/22/2021        Results from last 7 days   Lab Units 07/25/22  0522 07/24/22  0508   MAGNESIUM mg/dL 1 6* 1 6*                         Lipid Profile:   No results found for: CHOL  Lab Results   Component Value Date    HDL 55 10/23/2021    HDL 45 (L) 04/02/2021    HDL 44 05/26/2020     Lab Results   Component Value Date    LDLCALC 36 10/23/2021    LDLCALC 44 04/02/2021    LDLCALC 68 05/26/2020     Lab Results   Component Value Date    TRIG 75 10/23/2021    TRIG 133 7 04/02/2021    TRIG 157 (H) 05/26/2020       Cardiac testing:   No results found for this or any previous visit  No results found for this or any previous visit  No results found for this or any previous visit  No valid procedures specified  No results found for this or any previous visit        Meds/Allergies   all current active meds have been reviewed and current meds:   Current Facility-Administered Medications   Medication Dose Route Frequency    acetaminophen (TYLENOL) tablet 975 mg  975 mg Oral Q6H [address]    albuterol inhalation solution 2 5 mg  2 5 mg Nebulization Q4H PRN    amiodarone tablet 100 mg  100 mg Oral Daily With Breakfast    apixaban (ELIQUIS) tablet 5 mg 5 mg Oral BID    atorvastatin (LIPITOR) tablet 40 mg  40 mg Oral Daily With Dinner    benzonatate (TESSALON PERLES) capsule 100 mg  100 mg Oral TID PRN    cefepime (MAXIPIME) IVPB (premix in dextrose) 1,000 mg 50 mL  1,000 mg Intravenous Q12H    diazepam (VALIUM) tablet 5 mg  5 mg Oral Q6H PRN    escitalopram (LEXAPRO) tablet 10 mg  10 mg Oral Daily    fentanyl citrate (PF) 100 MCG/2ML   Intravenous Code/Trauma/Sedation Med    ferrous sulfate tablet 325 mg  325 mg Oral Daily With Breakfast    guaiFENesin (MUCINEX) 12 hr tablet 1,200 mg  1,200 mg Oral Q12H CEFER[address]    HYDROmorphone (DILAUDID) injection 0 5 mg  0 5 mg Intravenous Q2H PRN    HYDROmorphone (DILAUDID) tablet 2 mg  2 mg Oral Q4H PRN    HYDROmorphone (DILAUDID) tablet 4 mg  4 mg Oral Q4H PRN    insulin glargine (LANTUS) subcutaneous injection 33 Units 0 33 mL  33 Units Subcutaneous HS    insulin lispro (HumaLOG) 100 units/mL subcutaneous injection 18 Units  18 Units Subcutaneous TID With Meals    insulin lispro (HumaLOG) 100 units/mL subcutaneous injection 2-12 Units  2-12 Units Subcutaneous TID AC    insulin lispro (HumaLOG) 100 units/mL subcutaneous injection 2-12 Units  2-12 Units Subcutaneous HS    ipratropium (ATROVENT) 0 02 % inhalation solution 0 5 mg  0 5 mg Nebulization TID    levalbuterol (XOPENEX) inhalation solution 1 25 mg  1 25 mg Nebulization TID    losartan (COZAAR) tablet 50 mg  50 mg Oral Daily    methocarbamol (ROBAXIN) tablet 500 mg  500 mg Oral Q6H [address]    metoprolol succinate (TOPROL-XL) 24 hr tablet 50 mg  50 mg Oral Q12H    pantoprazole (PROTONIX) EC tablet 40 mg  40 mg Oral Early Morning    pregabalin (LYRICA) capsule 75 mg  75 mg Oral Daily    spironolactone (ALDACTONE) tablet 100 mg  100 mg Oral Daily    ticagrelor (BRILINTA) tablet 90 mg  90 mg Oral Q12H    trimethobenzamide (TIGAN) IM injection 200 mg  200 mg Intramuscular Q6H PRN    vancomycin (VANCOCIN) 1500 mg in sodium chloride 0 9% 250 mL IVPB  20 mg/kg Intravenous Once    vancomycin (VANCOCIN) IVPB (premix in dextrose) 1,000 mg 200 mL  12 5 mg/kg Intravenous Q12H          EKG personally reviewed by Charlyn Dakin, CRNP    Assessment:  Principal Problem:    Respiratory Distress  Active Problems:    Type 2 diabetes mellitus with hyperglycemia (HCC)    A-fib ([facility] [address] )    History of Cardiac arrest ([facility])    CAD (coronary artery disease)    Tracheostomy in place [facility])    Acute on chronic HFrEF (heart failure with reduced ejection fraction) ([facility])    Hypokalemia    Leukocytosis    Counseling / Coordination of Care  Total floor / unit time spent today 20 minutes  Greater than 50% of total time was spent with the patient and / or family counseling and / or coordination of care  ** Please Note: Dragon 360 Dictation voice to text software may have been used in the creation of this document   **

## 2022-07-26 NOTE — ASSESSMENT & PLAN NOTE
· Patient noted to be significantly more dyspneic and tachypneic this morning with minimal exertion of going to the bedside commode  · She is already being aggressively diuresed with IV Bumex  · She is reporting increased phlegm/tracheal secretions and procalcitonin newly noted to be elevated at 0 96---will add broad-spectrum empiric antibiotics including IV cefepime and vancomycin based on previous culture showing MRSA in sputum, recent chest tube and frequent hospitalizations  · Continue aggressive respiratory protocol with suctioning  · Spoke with pulmonology and Cardiology  · Will check updated chest x-ray stat

## 2022-07-26 NOTE — ASSESSMENT & PLAN NOTE
· Patient noted on imaging to have small pneumothorax at [facility], subsequently transferred to [facility] [place]  · Chest tube placed 7/20, then upsized on 7/22 given no significant change and appearance that it was kinked  · Chest x-ray performed 7/24 -no pneumothorax at that time  Chest tube was subsequently removed  · Patient had been experiencing significant pain around chest tube area  Status post nerve block by acute pain service 7/23/2022, as well as use of IV ketamine and Dilaudid    Pain now improved with chest tube out

## 2022-07-26 NOTE — PROGRESS NOTES
[facility]  Progress Note - Caity Cochran 1965, 65 y o  female MRN: [id number]  Unit/Bed#: S -01 Encounter: [id number]  Primary Care Provider: Wayne Reed MD   Date and time admitted to hospital: 7/19/2022 11:33 AM    * Respiratory Distress  Assessment & Plan  · Patient noted to be significantly more dyspneic and tachypneic this morning with minimal exertion of going to the bedside commode  · She is already being aggressively diuresed with IV Bumex  · She is reporting increased phlegm/tracheal secretions and procalcitonin newly noted to be elevated at 0 96---will add broad-spectrum empiric antibiotics including IV cefepime and vancomycin based on previous culture showing MRSA in sputum, recent chest tube and frequent hospitalizations  · Continue aggressive respiratory protocol with suctioning  · Spoke with pulmonology and Cardiology  · Will check updated chest x-ray stat    Primary spontaneous pneumothorax-resolved as of 7/25/2022  Assessment & Plan  · Patient noted on imaging to have small pneumothorax at [facility], subsequently transferred to [facility]  · Chest tube placed 7/20, then upsized on 7/22 given no significant change and appearance that it was kinked  · Chest x-ray performed 7/24 -no pneumothorax at that time  Chest tube was subsequently removed  · Patient had been experiencing significant pain around chest tube area  Status post nerve block by acute pain service 7/23/2022, as well as use of IV ketamine and Dilaudid    Pain now improved with chest tube out     Acute on chronic HFrEF (heart failure with reduced ejection fraction) (HCC)  Assessment & Plan  Wt Readings from Last 3 Encounters:   07/26/22 77 1 kg (170 lb)   07/19/22 78 2 kg (172 lb 6 4 oz)   06/29/22 81 7 kg (180 lb 3 2 oz)   · EF noted to be 50% on last echocardiogram 2/22  · Admitted to [facility] for CHF exac on 7/15 - given 80 mg IV lasix and then placed back on bumex 2 mg PO BID  · Developed MODESTO and thus bumex dose reduced - got 2 mg on 7/19, no bumex on on 7/20, 1 mg on 7/21, 2 mg on 7/23, 3 mg on 7/23 (home dose had been bumex 2 mg PO BID)  · 7/24 AM with increasing SOB, oxygen requiements - given 1 mg IV bumex with 4-5 episodes of urine output (amt not recorded)   · CXR showed mild vascular congestion  · Suspected iatrogenic volume overload in the setting of reduced/withheld diuretic dosing  · Consulted cardiology, appreciate their input  Patient received several doses IV Bumex  Like we euvolemic at this time, hold further IV doses    Tracheostomy in place [facility])  Assessment & Plan  · Trach placed in the context of cardiac arrest/prolonged ventilator dependent state November 2021  · Decannulated at [facility] 12/11/21  · Patient requires frequent tracheostomy changes and suctioning  · Per discussion with speech therapy,  video barium swallow was done for sense of food getting stuck  Appropriate for regular diet  · On trach collar O2 with humidification      Type 2 diabetes mellitus with hyperglycemia [facility])  Assessment & Plan  Lab Results   Component Value Date    HGBA1C 12 7 (H) 05/22/2022     Recent Labs     07/25/22  1115 07/25/22  1608 07/25/22  1746 07/25/22  2118   POCGLU 135 79 172* 266*     Blood Sugar Average: Last 72 hrs:  · (P) 943 [id number]   · Very poorly controlled based on A1c but current blood sugars fairly stable on current regimen of basal bolus insulin  · ADA diet    Acute kidney injury superimposed on chronic kidney disease stage 3 (HCC)-resolved as of 7/25/2022  Assessment & Plan  Lab Results   Component Value Date    EGFR 51 07/26/2022    EGFR 73 07/25/2022    EGFR 60 07/24/2022    CREATININE 1 18 07/26/2022    CREATININE 0 88 07/25/2022    CREATININE 1 03 07/24/2022   · Creatinine elevated at 1 64 upon transfer on 7/19/22, with baseline being 0 9-1 0  · Trended down to 0 88, but now with slight rise to 1  18     · Monitor carefully  · Resumed spironolactone as well as losartan on 7/23    A-fib ([facility] [address] )  Assessment & Plan  · On anticoagulation with Eliquis   · Caution for bleeding at chest tube site  · On amiodarone and Toprol which will be continued    Hypokalemia  Assessment & Plan  · Potassium currently 4  · Resumed spironolactone and ARB 7/23  · Resumed home dose of liquid potassium 40 meq PO BID--hold for now while holding bumex this am  · Goal for K >4 given history of VT cardiac arrest  · K 6 1 on 7/20/22  · Was withheld from supplemental potassium, spironolactone and ARB and dose of Kayexalate was given    CAD (coronary artery disease)  Assessment & Plan  · STEMI 10/22/2021 s/p PATRICA mid circumflex OM1  OM2 was ballooned but not stented  · Pulseless VT 10/27/21 - repeat LHC 90% mid circumflex stenosis, but could not be intervened on  · VT 10/28/21 LHC:  found to have apical LAD complete occlusion was felt to be small to be intervened  · Cardiac carrest 1/1/22 - [facility] felt to be hypoxic vs  VT induced  · On beta-blocker, Brilinta and Lipitor  · Resumed losartan however at half dose 7/23 to see if BP tolerates and resumed spironolactone 7/23    History of Cardiac arrest [facility])  Assessment & Plan  · STEMI 10/22/21 - PATRICA to mid circumflex  · VT arrest 10/26   · Polymorphic VT x 1 shock 10/28/21  · ICD not placed given risks felt to outweigh benefits with cognitive function and risk of infection at that that time  · Cardiac arrest 1/1/22 - likely VT related - went to [facility]  · No ICD given lung infection and on IV ABX x 4 weeks  · Canceled 2 EP appts since that time and thus no ICD in place - still wears LifeVest  · EF seems to have always been 40% or higher      VTE Pharmacologic Prophylaxis: VTE Score: 3 eliquis    Patient Centered Rounds: I performed bedside rounds with nursing staff today    Discussions with Specialists or Other Care Team Provider:  Case Management, Cardiology, pulmonology    Education and Discussions with Family / Patient: Patient declined call to   Time Spent for Care: 30 minutes  More than 50% of total time spent on counseling and coordination of care as described above  Current Length of Stay: 7 day(s)  Current Patient Status: Inpatient   Certification Statement: The patient will continue to require additional inpatient hospital stay due to Increased dyspnea  Discharge Plan: Not stable for discharge    Code Status: Level 1 - Full Code    Subjective: When I entered I noted that patient was quite tachypneic after having transferred from the bedside commode back to bed  She reported feeling increased shortness of breath increased tracheal secretions  No fever chills, no reports of increased chest pain  Reports she has had good urine  Objective:     Vitals:   Temp (24hrs), Av 6 °F (37 °C), Min:98 1 °F (36 7 °C), Max:99 1 °F (37 3 °C)    Temp:  [98 1 °F (36 7 °C)-99 1 °F (37 3 °C)] 98 9 °F (37 2 °C)  HR:  [56-74] 74  Resp:  [18] 18  BP: (109-134)/(66-78) 132/74  SpO2:  [89 %-97 %] 97 %  Body mass index is 25 1 kg/m²  Input and Output Summary (last 24 hours): Intake/Output Summary (Last 24 hours) at 2022 0839  Last data filed at 2022 0501  Gross per 24 hour   Intake 120 ml   Output 3250 ml   Net -3130 ml       Physical Exam:   Physical Exam  Vitals reviewed  Constitutional:       General: She is in acute distress  Appearance: She is ill-appearing  Eyes:      General: No scleral icterus  Right eye: No discharge  Left eye: No discharge  Conjunctiva/sclera: Conjunctivae normal    Cardiovascular:      Rate and Rhythm: Normal rate and regular rhythm  Heart sounds: No murmur heard  Pulmonary:      Effort: Respiratory distress present  Breath sounds: Rhonchi and rales present  No wheezing  Comments: Dyspneic, tachypneic, increased mucous  Abdominal:      General: There is no distension  Tenderness: There is no guarding     Musculoskeletal:      Right lower leg: No edema  Left lower leg: No edema  Skin:     General: Skin is warm and dry  Coloration: Skin is not jaundiced or pale  Findings: No bruising, erythema, lesion or rash  Neurological:      General: No focal deficit present  Mental Status: She is alert  Mental status is at baseline  Comments: No confusion          Additional Data:     Labs:  Results from last 7 days   Lab Units 07/26/22  0507   WBC Thousand/uL 12 70*   HEMOGLOBIN g/dL 9 7*   HEMATOCRIT % 32 1*   PLATELETS Thousands/uL 554*   NEUTROS PCT % 78*   LYMPHS PCT % 10*   MONOS PCT % 9   EOS PCT % 2     Results from last 7 days   Lab Units 07/26/22  0507 07/25/22  0522   SODIUM mmol/L 132* 135   POTASSIUM mmol/L 4 0 3 9   CHLORIDE mmol/L 93* 98   CO2 mmol/L 28 27   BUN mg/dL 33* 31*   CREATININE mg/dL 1 18 0 88   ANION GAP mmol/L 11 10   CALCIUM mg/dL 9 1 8 7   ALBUMIN g/dL  --  2 8*   TOTAL BILIRUBIN mg/dL  --  0 69   ALK PHOS U/L  --  96   ALT U/L  --  7   AST U/L  --  10*   GLUCOSE RANDOM mg/dL 161* 120         Results from last 7 days   Lab Units 07/25/22  2118 07/25/22  1746 07/25/22  1608 07/25/22  1115 07/25/22  0743 07/24/22  2052 07/24/22  1620 07/24/22  1200 07/24/22  0820 07/23/22  2100 07/23/22  1620 07/23/22  1253   POC GLUCOSE mg/dl 266* 172* 79 135 125 107 190* 153* 132 164* 212* 241*         Results from last 7 days   Lab Units 07/26/22  0507 07/19/22  1359   PROCALCITONIN ng/ml 0 96* 0 22       Lines/Drains:  Invasive Devices  Report    Peripheral Intravenous Line  Duration           Peripheral IV 07/24/22 Right; Lower Forearm 2 days          Airway  Duration           Surgical Airway Shiley Fenestrated 146 days                  Telemetry:  Telemetry Orders (From admission, onward)             LifeVest Patient: Continuous Telemetry Monitoring during hospitalization (non-expiring)  Continuous LifeVest Telemetry Monitoring        References:    LifeVest Policy                 Telemetry Reviewed:  Sinus rhythm  Indication for Continued Telemetry Use:  LifeVest             Imaging:   Will review updated chest x-ray    Recent Cultures (last 7 days):         Last 24 Hours Medication List:   Current Facility-Administered Medications   Medication Dose Route Frequency Provider Last Rate    acetaminophen  975 mg Oral Q6H [address] Leda Chand PA-C      albuterol  2 5 mg Nebulization Q4H PRN Leda Chand PA-C      amiodarone  100 mg Oral Daily With Breakfast Leda Chand PA-C      apixaban  5 mg Oral BID Leda Chand PA-C      atorvastatin  40 mg Oral Daily With [employer], DONALD      benzonatate  100 mg Oral TID PRN Edel Goetz MD      cefepime  2,000 mg Intravenous Q12H Leda Chand PA-C      diazepam  5 mg Oral Q6H PRN Robi Nelson PA-C      escitalopram  10 mg Oral Daily Leda Chand PA-C      fentanyl citrate (PF)   Intravenous Code/Trauma/Sedation Med Socorro Abbott MD      ferrous sulfate  325 mg Oral Daily With Breakfast Leda Chand PA-C      guaiFENesin  1,200 mg Oral Q12H [address] Leda Chand PA-C      HYDROmorphone  0 5 mg Intravenous Q2H PRN Jagdish Baptiste MD      HYDROmorphone  2 mg Oral Q4H PRN Ratna Reina PA-C      HYDROmorphone  4 mg Oral Q4H PRN Etta Nelson PA-C      insulin glargine  33 Units Subcutaneous HS Leda Chand PA-C      insulin lispro  18 Units Subcutaneous TID With Meals Etta Nelson PA-C      insulin lispro  2-12 Units Subcutaneous TID AC Leda Chand PA-C      insulin lispro  2-12 Units Subcutaneous HS Leda Chand PA-C      ipratropium  0 5 mg Nebulization TID Leda Chand PA-C      levalbuterol  1 25 mg Nebulization TID Leda Chand PA-C      losartan  50 mg Oral Daily Etta Nelson PA-C      methocarbamol  500 mg Oral Q6H [address] Jagdish Lopes MD      metoprolol succinate  50 mg Oral Q12H Leda Chand PA-C      pantoprazole  40 mg Oral Early Morning Leda Chand PA-C      pregabalin  75 mg Oral Daily Leda Chand PA-C      spironolactone  100 mg Oral Daily Ricarda Macias PA-C      ticagrelor  90 mg Oral Q12H Leda Chand PA-C      trimethobenzamide  200 mg Intramuscular Q6H PRN Rola Mast PA-C      vancomycin  15 mg/kg Intravenous Q12H Leda Chand PA-C          Today, Patient Was Seen By: Sean Hand PA-C    **Please Note: This note may have been constructed using a voice recognition system  **

## 2022-07-26 NOTE — ASSESSMENT & PLAN NOTE
Lab Results   Component Value Date    HGBA1C 12 7 (H) 05/22/2022     Recent Labs     07/25/22  1115 07/25/22  1608 07/25/22  1746 07/25/22  2118   POCGLU 135 79 172* 266*     Blood Sugar Average: Last 72 hrs:  · (P) 333 [id number]   · Very poorly controlled based on A1c but current blood sugars fairly stable on current regimen of basal bolus insulin  · ADA diet

## 2022-07-26 NOTE — PLAN OF CARE
Problem: Potential for Falls  Goal: Patient will remain free of falls  Description: INTERVENTIONS:  - Educate patient/family on patient safety including physical limitations  - Instruct patient to call for assistance with activity   - Consult OT/PT to assist with strengthening/mobility   - Keep Call bell within reach  - Keep bed low and locked with side rails adjusted as appropriate  - Keep care items and personal belongings within reach  - Initiate and maintain comfort rounds  - Make Fall Risk Sign visible to staff  - Apply yellow socks and bracelet for high fall risk patients  - Consider moving patient to room near nurses station  Outcome: Progressing     Problem: Nutrition/Hydration-ADULT  Goal: Nutrient/Hydration intake appropriate for improving, restoring or maintaining nutritional needs  Description: Monitor and assess patient's nutrition/hydration status for malnutrition  Collaborate with interdisciplinary team and initiate plan and interventions as ordered  Monitor patient's weight and dietary intake as ordered or per policy  Utilize nutrition screening tool and intervene as necessary  Determine patient's food preferences and provide high-protein, high-caloric foods as appropriate       INTERVENTIONS:  - Monitor oral intake, urinary output, labs, and treatment plans  - Assess nutrition and hydration status and recommend course of action  - Evaluate amount of meals eaten  - Assist patient with eating if necessary   - Allow adequate time for meals  - Recommend/ encourage appropriate diets, oral nutritional supplements, and vitamin/mineral supplements  - Order, calculate, and assess calorie counts as needed  - Recommend, monitor, and adjust tube feedings and TPN/PPN based on assessed needs  - Assess need for intravenous fluids  - Provide specific nutrition/hydration education as appropriate  - Include patient/family/caregiver in decisions related to nutrition  Outcome: Progressing     Problem: PAIN - ADULT  Goal: Verbalizes/displays adequate comfort level or baseline comfort level  Description: Interventions:  - Encourage patient to monitor pain and request assistance  - Assess pain using appropriate pain scale  - Administer analgesics based on type and severity of pain and evaluate response  - Implement non-pharmacological measures as appropriate and evaluate response  - Consider cultural and social influences on pain and pain management  - Notify physician/advanced practitioner if interventions unsuccessful or patient reports new pain  Outcome: Progressing     Problem: INFECTION - ADULT  Goal: Absence or prevention of progression during hospitalization  Description: INTERVENTIONS:  - Assess and monitor for signs and symptoms of infection  - Monitor lab/diagnostic results  - Monitor all insertion sites, i e  indwelling lines, tubes, and drains  - Monitor endotracheal if appropriate and nasal secretions for changes in amount and color  - Kimper appropriate cooling/warming therapies per order  - Administer medications as ordered  - Instruct and encourage patient and family to use good hand hygiene technique  - Identify and instruct in appropriate isolation precautions for identified infection/condition  Outcome: Progressing  Goal: Absence of fever/infection during neutropenic period  Description: INTERVENTIONS:  - Monitor WBC    Outcome: Progressing     Problem: SAFETY ADULT  Goal: Patient will remain free of falls  Description: INTERVENTIONS:  - Educate patient/family on patient safety including physical limitations  - Instruct patient to call for assistance with activity   - Consult OT/PT to assist with strengthening/mobility   - Keep Call bell within reach  - Keep bed low and locked with side rails adjusted as appropriate  - Keep care items and personal belongings within reach  - Initiate and maintain comfort rounds  - Make Fall Risk Sign visible to staff  - Apply yellow socks and bracelet for high fall risk patients  - Consider moving patient to room near nurses station  Outcome: Progressing  Goal: Maintain or return to baseline ADL function  Description: INTERVENTIONS:  -  Assess patient's ability to carry out ADLs; assess patient's baseline for ADL function and identify physical deficits which impact ability to perform ADLs (bathing, care of mouth/teeth, toileting, grooming, dressing, etc )  - Assess/evaluate cause of self-care deficits   - Assess range of motion  - Assess patient's mobility; develop plan if impaired  - Assess patient's need for assistive devices and provide as appropriate  - Encourage maximum independence but intervene and supervise when necessary  - Involve family in performance of ADLs  - Assess for home care needs following discharge   - Consider OT consult to assist with ADL evaluation and planning for discharge  - Provide patient education as appropriate  Outcome: Progressing  Goal: Maintains/Returns to pre admission functional level  Description: INTERVENTIONS:  - Perform BMAT or MOVE assessment daily    - Set and communicate daily mobility goal to care team and patient/family/caregiver     - Collaborate with rehabilitation services on mobility goals if consulted  - Out of bed for toileting  - Record patient progress and toleration of activity level   Outcome: Progressing     Problem: DISCHARGE PLANNING  Goal: Discharge to home or other facility with appropriate resources  Description: INTERVENTIONS:  - Identify barriers to discharge w/patient and caregiver  - Arrange for needed discharge resources and transportation as appropriate  - Identify discharge learning needs (meds, wound care, etc )  - Arrange for interpretive services to assist at discharge as needed  - Refer to Case Management Department for coordinating discharge planning if the patient needs post-hospital services based on physician/advanced practitioner order or complex needs related to functional status, cognitive ability, or social support system  Outcome: Progressing     Problem: Knowledge Deficit  Goal: Patient/family/caregiver demonstrates understanding of disease process, treatment plan, medications, and discharge instructions  Description: Complete learning assessment and assess knowledge base  Interventions:  - Provide teaching at level of understanding  - Provide teaching via preferred learning methods  Outcome: Progressing     Problem: MOBILITY - ADULT  Goal: Maintain or return to baseline ADL function  Description: INTERVENTIONS:  -  Assess patient's ability to carry out ADLs; assess patient's baseline for ADL function and identify physical deficits which impact ability to perform ADLs (bathing, care of mouth/teeth, toileting, grooming, dressing, etc )  - Assess/evaluate cause of self-care deficits   - Assess range of motion  - Assess patient's mobility; develop plan if impaired  - Assess patient's need for assistive devices and provide as appropriate  - Encourage maximum independence but intervene and supervise when necessary  - Involve family in performance of ADLs  - Assess for home care needs following discharge   - Consider OT consult to assist with ADL evaluation and planning for discharge  - Provide patient education as appropriate  Outcome: Progressing  Goal: Maintains/Returns to pre admission functional level  Description: INTERVENTIONS:  - Perform BMAT or MOVE assessment daily    - Set and communicate daily mobility goal to care team and patient/family/caregiver     - Collaborate with rehabilitation services on mobility goals if consulted  - Out of bed for toileting  - Record patient progress and toleration of activity level   Outcome: Progressing     Problem: Prexisting or High Potential for Compromised Skin Integrity  Goal: Skin integrity is maintained or improved  Description: INTERVENTIONS:  - Identify patients at risk for skin breakdown  - Assess and monitor skin integrity  - Assess and monitor nutrition and hydration status  - Monitor labs   - Assess for incontinence   - Turn and reposition patient  - Assist with mobility/ambulation  - Relieve pressure over bony prominences  - Avoid friction and shearing  - Provide appropriate hygiene as needed including keeping skin clean and dry  - Evaluate need for skin moisturizer/barrier cream  - Collaborate with interdisciplinary team   - Patient/family teaching  - Consider wound care consult   Outcome: Progressing

## 2022-07-26 NOTE — ASSESSMENT & PLAN NOTE
Lab Results   Component Value Date    EGFR 51 07/26/2022    EGFR 73 07/25/2022    EGFR 60 07/24/2022    CREATININE 1 18 07/26/2022    CREATININE 0 88 07/25/2022    CREATININE 1 03 07/24/2022   · Creatinine elevated at 1 64 upon transfer on 7/19/22, with baseline being 0 9-1 0  · Trended down to 0 88, but now with slight rise to 1  18     · Monitor carefully  · Resumed spironolactone as well as losartan on 7/23

## 2022-07-26 NOTE — PROGRESS NOTES
Progress Note - Pulmonary   Kelli Red 65 y o  female MRN: [id number]  Unit/Bed#: S -01 Encounter: [id number]    Assessment/Plan:    1  Acute pulmonary insufficiency on chronic hypoxic respiratory failure       -  currently on 6 L trach collar, home O2 requirements 10 L       -  maintain saturations greater than 88%       -  pulmonary toileting:  Deep breathing cough, suction as needed, trach care    2  Primary spontaneous pneumothorax       -  etiology unclear secondary to possible coughing with possible blood       -  7/20/2022- 10 Fr  CT       -  7/22/2022- upsize 14 Fr  CT       -  7/24/2022- chest tube removed       -  will continue supportive care no indication for further intervention    3  Possible RLL PNA vs atelectasis in the setting of possible dysphagia       -  7/21/2022- VBS- no noted aspiration events       -  procalcitonin- 0 22-- 0 96,    WBC trending down 12 70, afebrile       -  sputum pending       -  Day # 1- cefepime/vancomycin-  no further microbiology is unremarkable would recommend discontinuing antibiotics quickly as she is a candidate for resistant w/ no noted over opacity    4  Acute on chronic diastolic CHF w/ monitor PHTN likely WHO group II & III and PAF       -  2/2022-  EF 50%    PA pressure 54 mmHg       -  will continue I&Os and daily weight       -  diuresing per Cardiology    5  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       -  11/2022- tracheostomy placed       -  maintained on 6 highly uncuffed       -   continue trach care and suction as needed    6  Suspected COPD of unknown severity without acute exacerbation        -  Inpatient:  Albuterol as needed        -  home regimen:  Albuterol q 6h p r n         -  no indication for steroids at this time    7   Mild CHANTALE       -  follows with Dr Madina Vasquez       -  will resume BiPAP once closer to discharge given recent pneumothorax        Chief Complaint:    "I feel a little short of breath"    Subjective:    Kelli currently sitting in her bed  She does report that she feels somewhat short of breath  No significant overnight events reported  Patient currently denying any fevers, chills hemoptysis, headaches, night sweats, pleuritic chest pain, or palpitations  Objective:    Vitals: Blood pressure 115/72, pulse 73, temperature 99 1 °F (37 3 °C), resp  rate 18, weight 77 1 kg (170 lb), SpO2 93 %  6L,Body mass index is 25 1 kg/m²  Intake/Output Summary (Last 24 hours) at 7/26/2022 1334  Last data filed at 7/26/2022 0501  Gross per 24 hour   Intake 120 ml   Output 2200 ml   Net -2080 ml       Invasive Devices  Report    Peripheral Intravenous Line  Duration           Peripheral IV 07/24/22 Right; Lower Forearm 2 days          Airway  Duration           Surgical Airway Shiley Fenestrated 146 days                Physical Exam:   Physical Exam  Constitutional:       General: She is not in acute distress  Appearance: Normal appearance  She is normal weight  She is not ill-appearing  HENT:      Head: Normocephalic and atraumatic  Nose: Nose normal  No congestion or rhinorrhea  Mouth/Throat:      Mouth: Mucous membranes are dry  Pharynx: No oropharyngeal exudate or posterior oropharyngeal erythema  Cardiovascular:      Rate and Rhythm: Normal rate and regular rhythm  Pulses: Normal pulses  Heart sounds: Normal heart sounds  No murmur heard  No friction rub  No gallop  Pulmonary:      Effort: No tachypnea, bradypnea, accessory muscle usage or respiratory distress  Breath sounds: Decreased air movement present  No stridor  Decreased breath sounds and rhonchi present  No wheezing or rales  Comments: Rhonchi throughout lung fields  Chest:      Chest wall: No tenderness  Abdominal:      General: Abdomen is flat  Bowel sounds are normal  There is no distension  Palpations: Abdomen is soft  There is no mass     Musculoskeletal:         General: No swelling or tenderness  Normal range of motion  Cervical back: Normal range of motion  No rigidity or tenderness  Skin:     General: Skin is warm and dry  Coloration: Skin is not jaundiced or pale  Neurological:      General: No focal deficit present  Mental Status: She is alert and oriented to person, place, and time  Mental status is at baseline  Psychiatric:         Mood and Affect: Mood normal          Behavior: Behavior normal          Labs:    I have personally reviewed pertinent lab results CBC:   Lab Results   Component Value Date    WBC 12 70 (H) 07/26/2022    HGB 9 7 (L) 07/26/2022    HCT 32 1 (L) 07/26/2022    MCV 76 (L) 07/26/2022     (H) 07/26/2022    MCH 23 0 (L) 07/26/2022    MCHC 30 2 (L) 07/26/2022    RDW 17 3 (H) 07/26/2022    MPV 9 8 07/26/2022    NRBC 0 07/26/2022   , CMP:   Lab Results   Component Value Date    SODIUM 132 (L) 07/26/2022    K 4 0 07/26/2022    CL 93 (L) 07/26/2022    CO2 28 07/26/2022    BUN 33 (H) 07/26/2022    CREATININE 1 18 07/26/2022    CALCIUM 9 1 07/26/2022    EGFR 51 07/26/2022       Imaging and other studies: I have personally reviewed pertinent films in PACS     CXR- 2/26/2022- right lower lobe atelectasis

## 2022-07-26 NOTE — CASE MANAGEMENT
Case Management Progress Note    Patient name Benjamín Mejias  Location S /S -01 MRN [id number]  : 1965 Date 2022       LOS (days): 7  Geometric Mean LOS (GMLOS) (days): 5 10  Days to GMLOS:-1 9        OBJECTIVE:        Current admission status: Inpatient  Preferred Pharmacy:   [address], [address] [place], [address]  [address] [facility]  [address]  Phone: [phone] Fax: [address], [address] [place] [address] [place]  [address]  [facility] [address]  Phone: [phone] Fax: [phone]    [place] [address] #[id number] - [address], [facility] [address], [place] [address] [facility] [address]  Phone: [phone] Fax: [phone] [facility], [address] [facility] [address]  Phone: [phone] Fax: [phone]    Primary Care Provider: Oumou Muhammad MD    Primary Insurance: Karie Mansoor REP  Secondary Insurance: [address] NOTE:    Cm continues to work on [facility] [employer]  Cm confirmed with medical team that patient is not stable for dc at this time  Patient is now on ABX and continues with respiratory distress  At this time, plan still for patient to return [place] [address] services  Cm confirmed that [facility] will provide home services for patient  Cm will follow to see if there will be any dc needs

## 2022-07-26 NOTE — ASSESSMENT & PLAN NOTE
· STEMI 10/22/21 - PATRICA to mid circumflex  · VT arrest 10/26   · Polymorphic VT x 1 shock 10/28/21  · ICD not placed given risks felt to outweigh benefits with cognitive function and risk of infection at that that time  · Cardiac arrest 1/1/22 - likely VT related - went to [facility]  · No ICD given lung infection and on IV ABX x 4 weeks  · Canceled 2 EP appts since that time and thus no ICD in place - still wears LifeVest  · EF seems to have always been 40% or higher

## 2022-07-27 PROBLEM — E87.5 HYPERKALEMIA: Status: RESOLVED | Noted: 2022-07-19 | Resolved: 2022-07-27

## 2022-07-27 PROBLEM — R52 PAIN: Status: ACTIVE | Noted: 2022-07-27

## 2022-07-27 PROBLEM — I50.23 ACUTE ON CHRONIC HFREF (HEART FAILURE WITH REDUCED EJECTION FRACTION) (HCC): Status: RESOLVED | Noted: 2022-03-21 | Resolved: 2022-07-27

## 2022-07-27 LAB
ANION GAP SERPL CALCULATED.3IONS-SCNC: 11 MMOL/L (ref 4–13)
BUN SERPL-MCNC: 35 MG/DL (ref 5–25)
CALCIUM SERPL-MCNC: 8.7 MG/DL (ref 8.4–10.2)
CHLORIDE SERPL-SCNC: 94 MMOL/L (ref 96–108)
CO2 SERPL-SCNC: 27 MMOL/L (ref 21–32)
CREAT SERPL-MCNC: 1.06 MG/DL (ref 0.6–1.3)
GFR SERPL CREATININE-BSD FRML MDRD: 58 ML/MIN/1.73SQ M
GLUCOSE SERPL-MCNC: 177 MG/DL (ref 65–140)
GLUCOSE SERPL-MCNC: 185 MG/DL (ref 65–140)
GLUCOSE SERPL-MCNC: 221 MG/DL (ref 65–140)
GLUCOSE SERPL-MCNC: 278 MG/DL (ref 65–140)
GLUCOSE SERPL-MCNC: 292 MG/DL (ref 65–140)
MRSA NOSE QL CULT: NORMAL
POTASSIUM SERPL-SCNC: 3.8 MMOL/L (ref 3.5–5.3)
PROCALCITONIN SERPL-MCNC: 0.68 NG/ML
SODIUM SERPL-SCNC: 132 MMOL/L (ref 135–147)
VANCOMYCIN TROUGH SERPL-MCNC: 21.6 UG/ML (ref 10–20)

## 2022-07-27 PROCEDURE — 84145 PROCALCITONIN (PCT): CPT | Performed by: PHYSICIAN ASSISTANT

## 2022-07-27 PROCEDURE — 99232 SBSQ HOSP IP/OBS MODERATE 35: CPT | Performed by: INTERNAL MEDICINE

## 2022-07-27 PROCEDURE — 82948 REAGENT STRIP/BLOOD GLUCOSE: CPT

## 2022-07-27 PROCEDURE — 99232 SBSQ HOSP IP/OBS MODERATE 35: CPT | Performed by: PHYSICIAN ASSISTANT

## 2022-07-27 PROCEDURE — 80202 ASSAY OF VANCOMYCIN: CPT | Performed by: PHYSICIAN ASSISTANT

## 2022-07-27 PROCEDURE — 94640 AIRWAY INHALATION TREATMENT: CPT

## 2022-07-27 PROCEDURE — 94760 N-INVAS EAR/PLS OXIMETRY 1: CPT

## 2022-07-27 PROCEDURE — 80048 BASIC METABOLIC PNL TOTAL CA: CPT | Performed by: PHYSICIAN ASSISTANT

## 2022-07-27 RX ORDER — INSULIN GLARGINE 100 [IU]/ML
35 INJECTION, SOLUTION SUBCUTANEOUS
Status: DISCONTINUED | OUTPATIENT
Start: 2022-07-27 | End: 2022-07-28

## 2022-07-27 RX ORDER — LIDOCAINE 50 MG/G
2 PATCH TOPICAL DAILY
Status: DISCONTINUED | OUTPATIENT
Start: 2022-07-27 | End: 2022-08-02 | Stop reason: HOSPADM

## 2022-07-27 RX ORDER — PREGABALIN 25 MG/1
25 CAPSULE ORAL EVERY EVENING
Status: DISCONTINUED | OUTPATIENT
Start: 2022-07-27 | End: 2022-08-02 | Stop reason: HOSPADM

## 2022-07-27 RX ORDER — POTASSIUM CHLORIDE 20 MEQ/1
20 TABLET, EXTENDED RELEASE ORAL DAILY
Status: DISCONTINUED | OUTPATIENT
Start: 2022-07-27 | End: 2022-07-29

## 2022-07-27 RX ADMIN — PREGABALIN 75 MG: 75 CAPSULE ORAL at 08:30

## 2022-07-27 RX ADMIN — TICAGRELOR 90 MG: 90 TABLET ORAL at 05:23

## 2022-07-27 RX ADMIN — LEVALBUTEROL HYDROCHLORIDE 1.25 MG: 1.25 SOLUTION, CONCENTRATE RESPIRATORY (INHALATION) at 07:26

## 2022-07-27 RX ADMIN — INSULIN GLARGINE 35 UNITS: 100 INJECTION, SOLUTION SUBCUTANEOUS at 21:40

## 2022-07-27 RX ADMIN — SPIRONOLACTONE 100 MG: 100 TABLET ORAL at 08:30

## 2022-07-27 RX ADMIN — IPRATROPIUM BROMIDE 0.5 MG: 0.5 SOLUTION RESPIRATORY (INHALATION) at 14:00

## 2022-07-27 RX ADMIN — METHOCARBAMOL 500 MG: 500 TABLET ORAL at 13:03

## 2022-07-27 RX ADMIN — DIAZEPAM 5 MG: 5 TABLET ORAL at 14:38

## 2022-07-27 RX ADMIN — VANCOMYCIN HYDROCHLORIDE 1000 MG: 1 INJECTION, SOLUTION INTRAVENOUS at 08:31

## 2022-07-27 RX ADMIN — DICLOFENAC SODIUM TOPICAL GEL, 1%, 2 G: 10 GEL TOPICAL at 22:09

## 2022-07-27 RX ADMIN — APIXABAN 5 MG: 5 TABLET, FILM COATED ORAL at 08:30

## 2022-07-27 RX ADMIN — DIAZEPAM 5 MG: 5 TABLET ORAL at 08:36

## 2022-07-27 RX ADMIN — LIDOCAINE 5% 2 PATCH: 700 PATCH TOPICAL at 11:07

## 2022-07-27 RX ADMIN — DESMOPRESSIN ACETATE 40 MG: 0.2 TABLET ORAL at 16:52

## 2022-07-27 RX ADMIN — APIXABAN 5 MG: 5 TABLET, FILM COATED ORAL at 18:24

## 2022-07-27 RX ADMIN — METHOCARBAMOL 500 MG: 500 TABLET ORAL at 18:24

## 2022-07-27 RX ADMIN — TICAGRELOR 90 MG: 90 TABLET ORAL at 16:52

## 2022-07-27 RX ADMIN — INSULIN LISPRO 6 UNITS: 100 INJECTION, SOLUTION INTRAVENOUS; SUBCUTANEOUS at 13:07

## 2022-07-27 RX ADMIN — CEFEPIME HYDROCHLORIDE 1000 MG: 1 INJECTION, SOLUTION INTRAVENOUS at 11:07

## 2022-07-27 RX ADMIN — LOSARTAN POTASSIUM 50 MG: 50 TABLET, FILM COATED ORAL at 08:30

## 2022-07-27 RX ADMIN — INSULIN LISPRO 18 UNITS: 100 INJECTION, SOLUTION INTRAVENOUS; SUBCUTANEOUS at 18:25

## 2022-07-27 RX ADMIN — BENZONATATE 100 MG: 100 CAPSULE ORAL at 06:35

## 2022-07-27 RX ADMIN — VANCOMYCIN HYDROCHLORIDE 750 MG: 750 INJECTION, SOLUTION INTRAVENOUS at 22:08

## 2022-07-27 RX ADMIN — INSULIN LISPRO 6 UNITS: 100 INJECTION, SOLUTION INTRAVENOUS; SUBCUTANEOUS at 21:36

## 2022-07-27 RX ADMIN — AMIODARONE HYDROCHLORIDE 100 MG: 200 TABLET ORAL at 08:30

## 2022-07-27 RX ADMIN — PANTOPRAZOLE SODIUM 40 MG: 40 TABLET, DELAYED RELEASE ORAL at 05:24

## 2022-07-27 RX ADMIN — POTASSIUM CHLORIDE 20 MEQ: 1500 TABLET, EXTENDED RELEASE ORAL at 13:03

## 2022-07-27 RX ADMIN — ACETAMINOPHEN 975 MG: 325 TABLET ORAL at 18:24

## 2022-07-27 RX ADMIN — ESCITALOPRAM OXALATE 10 MG: 10 TABLET ORAL at 08:30

## 2022-07-27 RX ADMIN — CEFEPIME HYDROCHLORIDE 1000 MG: 1 INJECTION, SOLUTION INTRAVENOUS at 20:17

## 2022-07-27 RX ADMIN — INSULIN LISPRO 4 UNITS: 100 INJECTION, SOLUTION INTRAVENOUS; SUBCUTANEOUS at 18:24

## 2022-07-27 RX ADMIN — IPRATROPIUM BROMIDE 0.5 MG: 0.5 SOLUTION RESPIRATORY (INHALATION) at 07:26

## 2022-07-27 RX ADMIN — LEVALBUTEROL HYDROCHLORIDE 1.25 MG: 1.25 SOLUTION, CONCENTRATE RESPIRATORY (INHALATION) at 14:00

## 2022-07-27 RX ADMIN — METOPROLOL SUCCINATE 50 MG: 50 TABLET, EXTENDED RELEASE ORAL at 16:52

## 2022-07-27 RX ADMIN — METHOCARBAMOL 500 MG: 500 TABLET ORAL at 05:24

## 2022-07-27 RX ADMIN — GUAIFENESIN 1200 MG: 600 TABLET ORAL at 20:17

## 2022-07-27 RX ADMIN — DIAZEPAM 5 MG: 5 TABLET ORAL at 20:23

## 2022-07-27 RX ADMIN — PREGABALIN 25 MG: 25 CAPSULE ORAL at 18:24

## 2022-07-27 RX ADMIN — GUAIFENESIN 1200 MG: 600 TABLET ORAL at 08:30

## 2022-07-27 RX ADMIN — INSULIN LISPRO 18 UNITS: 100 INJECTION, SOLUTION INTRAVENOUS; SUBCUTANEOUS at 13:05

## 2022-07-27 NOTE — ASSESSMENT & PLAN NOTE
· Potassium currently 4  · Resumed spironolactone and ARB 7/23  · Careful with oral potassium repletion  · Goal for K >4 given history of VT cardiac arrest  · K 6 1 on 7/20/22  · Was withheld from supplemental potassium, spironolactone and ARB and dose of Kayexalate was given

## 2022-07-27 NOTE — ASSESSMENT & PLAN NOTE
Wt Readings from Last 3 Encounters:   07/27/22 76 9 kg (169 lb 9 6 oz)   07/19/22 78 2 kg (172 lb 6 4 oz)   06/29/22 81 7 kg (180 lb 3 2 oz)   · EF noted to be 50% on last echocardiogram 2/22  · Ad[facility] to [facility]F exac on 7/15 - given 80 mg IV lasix and then placed back on bumex 2 mg PO BID  · Developed MODESTO and thus bumex dose reduced - got 2 mg on 7/19, no bumex on on 7/20, 1 mg on 7/21, 2 mg on 7/23, 3 mg on 7/23 (home dose had been bumex 2 mg PO BID)  · 7/24 AM with increasing SOB, oxygen requiements - given 1 mg IV bumex with 4-5 episodes of urine output (amt not recorded)   · CXR showed mild vascular congestion  · Suspected iatrogenic volume overload in the setting of reduced/withheld diuretic dosing  · Consulted cardiology, appreciate their input  Patient received several doses IV Bumex    Currently back to PO Bumex

## 2022-07-27 NOTE — ASSESSMENT & PLAN NOTE
· Patient noted to be significantly more dyspneic and tachypneic on the morning of July 26 with minimal exertion of going to the bedside commode  · She had already been aggressively diuresed with IV Bumex  · She also reported increased phlegm/tracheal secretions and procalcitonin was newly noted to be elevated at 0 96--now on day 2 broad-spectrum empiric antibiotics including IV cefepime and vancomycin based on previous culture showing MRSA in sputum, recent chest tube and frequent hospitalizations  · Continue aggressive respiratory protocol with suctioning  · Spoke with pulmonology and Cardiology  · Updated x-ray with atelectasis

## 2022-07-27 NOTE — ASSESSMENT & PLAN NOTE
· STEMI 10/22/2021 s/p PATRICA mid circumflex OM1  OM2 was ballooned but not stented  · Pulseless VT 10/27/21 - repeat LHC 90% mid circumflex stenosis, but could not be intervened on  · VT 10/28/21 LHC:  found to have apical LAD complete occlusion was felt to be small to be intervened    · Cardiac carrest 1/1/22 - [facility] felt to be hypoxic vs  VT induced  · On beta-blocker, Brilinta and Lipitor

## 2022-07-27 NOTE — ASSESSMENT & PLAN NOTE
· Trach placed in the context of cardiac arrest/prolonged ventilator dependent state November 2021  · Decannulated at [facility] 12/11/21  · Patient requires frequent tracheostomy changes and suctioning  · Per discussion with speech therapy,  video barium swallow was done for sense of food getting stuck   Appropriate for regular diet  · On trach collar O2 with humidification

## 2022-07-27 NOTE — PROGRESS NOTES
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      6  Suspected COPD of unknown severity without acute exacerbation     7  Mild CHANTALE          Plan:  · Breathing overall stable, oxygen requirement remains the same on baseline 10L nasal bj  · Complains of pain along previous chest tube site, primary team reaching out to pain management  · Sputum culture too young, no specific growth identified  · Continue with ABX for now, MRSA swab is negative, if negative can d/c vancomycin  Would plan for 5 day course of antibiotics  · Needs to mobilize and get out bed, encourage pulmonary hygeine    Subjective:   "I feel okay, I have some pain"    Vitals: Blood pressure 112/63, pulse 75, temperature 99 6 °F (37 6 °C), temperature source Oral, resp  rate 14, weight 76 9 kg (169 lb 9 6 oz), SpO2 95 %  , Body mass index is 25 05 kg/m²  Intake/Output Summary (Last 24 hours) at 7/27/2022 1405  Last data filed at 7/27/2022 0351  Gross per 24 hour   Intake --   Output 850 ml   Net -850 ml       Physical Exam  Gen: Trach in place  HEENT: Mucous membranes moist, no oral lesions, no thrush  NECK: No accessory muscle use, JVP not elevated  Cardiac: Regular, single S1, single S2, no murmurs, no rubs, no gallops  Lungs: Decreased breath sounds at the bases  Abdomen: normoactive bowel sounds, soft nontender, nondistended, no rebound or rigidity, no guarding  Extremities: no cyanosis, no clubbing, no edema    Labs: I have personally reviewed pertinent lab results          Heather Bhatti MD

## 2022-07-27 NOTE — ASSESSMENT & PLAN NOTE
· Patient feels pain along the track of the prior chest tube despite being removed 2 days ago    Reviewed this with pain management team who will come back and reassess the patient to determine if she would be appropriate for a nerve block  · In the meantime would recommend maximizing nonnarcotic pain control measures including muscle relaxants; nurse providing a dose of Valium now and I have increased her Lyrica an added Lidoderm patch

## 2022-07-27 NOTE — ASSESSMENT & PLAN NOTE
· STEMI 10/22/21 - PATRICA to mid circumflex  · VT arrest 10/26   · Polymorphic VT x 1 shock 10/28/21  · ICD not placed given risks felt to outweigh benefits with cognitive function and risk of infection at that that time  · Cardiac arrest 1/1/22 - likely VT related - went to [facility]  · No ICD given lung infection and on IV ABX x 4 weeks  · Canceled 2 EP appts since that time and thus no ICD in place - still wears LifeVest  · EF seems to have always been 40% or higher

## 2022-07-27 NOTE — PROGRESS NOTES
General Cardiology   Progress Note -  Team One   Amy Aponte 65 y o  female MRN: [id number]    Unit/Bed#: S -01 Encounter: [id number]    Assessment  1  Acute on Chronic HFmEF  2  ICM w/improved EF to 50% (previously 40-45% in 10/2021)  -On PE -- appears euvolemic  -BNP level 165  -Chest x-ray 7/24/22 - pulmonary vascular congestion  -TTE 2/2022 - LVEF 50%, moderate hypokinesis of the inferior in the basal anterior lateral wall, moderate concentric hypertrophy, LA/RA mildly dilated, RV normal size/systolic function, mild TR  -GDM T - losartan 50 mg daily, spironolactone 100 mg daily, and metoprolol succinate 50 mg b i d   -Previously on oral Bumex 2 mg b i d as an outpatient (recently switched from furosemide 60 mg b i d  on 7/5)   -Underwent a course of aggressive IV diuresis was discontinued yesterday  -24 hour I&O balance; -850 mL; overall -6 3 L  -Weights; baseline/dry weights appear to be in the low to mid 180s   Standing scale weight on 7/27 - 169 lb   3  CAD / lateral wall STEMI (10/22/2021) s/p PCI/PATRICA x 1 mid LCX into OM1, OM2 underwent balloon  -Denies anginal symptoms   -Last LHC on 10/28/21 showed patent OM1 stent, jailed mLCx, and new distal LAD occlusion too small for intervention     -On Brilinta 90 mg q12h, Eliquis 5 mg b i d , high-intensity statin, and BB    4  Paroxysmal atrial fibrillation  5  Hx of VT cardiac arrest  -Maintaining NSR   -On amiodarone 100 mg daily, and metoprolol succinate 50 mg b i d   -Wearing life vest at home   Pending EP follow-up as an outpatient for ICD consideration    6  Spontaneous right-sided pneumothorax   -Pulmonary following  -Right-sided chest tube removed on 7/24   7  Essential hypertension  -Average /72, last recorded 128/80, HR 76    -BP regimen - Bumex 2 mg b i d, losartan 50 mg daily, metoprolol succinate 50 mg b i d , and spironolactone 100 mg daily  8  Poorly controlled DM  -HGB A1c 12 7 - 5/2022      Plan  -Volume status appears stable  Continue oral Bumex 2 mg b i d + spironolactone 100 mg daily   -Continue losartan 50 mg daily  -Continue metoprolol succinate 50 mg b i d and amiodarone 100 mg daily   -Continue strict I&Os, daily standing weights, and 2 g NA +diet 1 8 L FR    -Re-application of Life Vest upon discharge   -Follow-up with Cardiology/EP service upon discharge for ICD consideration    Subjective  Review of Systems   Constitutional: Positive for malaise/fatigue  Negative for chills and fever  Eyes: Negative for visual disturbance  Cardiovascular: Negative for chest pain, dyspnea on exertion, leg swelling, orthopnea and palpitations  Respiratory: Positive for cough  Negative for shortness of breath  Gastrointestinal: Negative for abdominal pain  Neurological: Negative for dizziness, headaches and light-headedness  Objective:   Physical Exam  Vitals and nursing note reviewed  Constitutional:       General: She is not in acute distress  Appearance: She is not diaphoretic  HENT:      Head: Normocephalic and atraumatic  Mouth/Throat:      Mouth: Mucous membranes are moist    Eyes:      General: No scleral icterus  Neck:      Comments: No JVD  Cardiovascular:      Rate and Rhythm: Normal rate and regular rhythm  Pulses: Normal pulses  Heart sounds: Normal heart sounds  Pulmonary:      Effort: Pulmonary effort is normal       Breath sounds: Normal breath sounds  Comments: Tracheostomy - attached to humidified O2  Abdominal:      Palpations: Abdomen is soft  Tenderness: There is no abdominal tenderness  Musculoskeletal:      Cervical back: Neck supple  Right lower leg: No edema  Skin:     General: Skin is warm and dry  Capillary Refill: Capillary refill takes less than 2 seconds  Neurological:      General: No focal deficit present  Mental Status: She is alert and oriented to person, place, and time     Psychiatric:         Mood and Affect: Mood normal          Vitals: Blood pressure 128/80, pulse 76, temperature 98 6 °F (37 °C), resp  rate (!) 23, weight 76 9 kg (169 lb 9 6 oz), SpO2 98 %  ,     Body mass index is 25 05 kg/m²  ,   Systolic (34ERY), ORLIN:484 , Min:99 , JSK:279     Diastolic (30LHE), PIÑA:18, Min:63, Max:83      Intake/Output Summary (Last 24 hours) at 7/27/2022 1008  Last data filed at 7/27/2022 0351  Gross per 24 hour   Intake --   Output 850 ml   Net -850 ml     Weight (last 2 days)     Date/Time Weight    07/27/22 0600 76 9 (169 6)    07/26/22 0600 77 1 (170)    07/25/22 1213 78 9 (174)          LABORATORY RESULTS      CBC with diff:   Results from last 7 days   Lab Units 07/26/22  0507 07/25/22  0522 07/24/22  1213 07/23/22  0502   WBC Thousand/uL 12 70* 15 82* 21 17* 21 72*   HEMOGLOBIN g/dL 9 7* 8 7* 9 0* 9 4*   HEMATOCRIT % 32 1* 28 7* 28 9* 31 0*   MCV fL 76* 76* 76* 78*   PLATELETS Thousands/uL 554* 423* 449* 433*   MCH pg 23 0* 23 0* 23 6* 23 6*   MCHC g/dL 30 2* 30 3* 31 1* 30 3*   RDW % 17 3* 17 2* 17 2* 17 0*   MPV fL 9 8 9 9 10 2 9 6   NRBC AUTO /100 WBCs 0 0 0 0       CMP:  Results from last 7 days   Lab Units 07/27/22  0619 07/26/22  0507 07/25/22  0522 07/24/22  0508 07/23/22  0502 07/22/22  0451 07/21/22  0542   POTASSIUM mmol/L 3 8 4 0 3 9 3 2* 3 7 4 1 4 5   CHLORIDE mmol/L 94* 93* 98 94* 96 98 96   CO2 mmol/L 27 28 27 30 31 27 31   BUN mg/dL 35* 33* 31* 36* 38* 45* 42*   CREATININE mg/dL 1 06 1 18 0 88 1 03 1 15 1 24 1 26   CALCIUM mg/dL 8 7 9 1 8 7 9 3 8 8 8 8 9 2   AST U/L  --   --  10*  --   --   --   --    ALT U/L  --   --  7  --   --   --   --    ALK PHOS U/L  --   --  96  --   --   --   --    EGFR ml/min/1 73sq m 58 51 73 60 53 48 47       BMP:  Results from last 7 days   Lab Units 07/27/22  0619 07/26/22  0507 07/25/22  0522 07/24/22  0508 07/23/22  0502 07/22/22  0451 07/21/22  0542   POTASSIUM mmol/L 3 8 4 0 3 9 3 2* 3 7 4 1 4 5   CHLORIDE mmol/L 94* 93* 98 94* 96 98 96   CO2 mmol/L 27 28 27 30 31 27 31   BUN mg/dL 35* 33* 31* 36* 38* 45* 42*   CREATININE mg/dL 1 06 1 18 0 88 1 03 1 15 1 24 1 26   CALCIUM mg/dL 8 7 9 1 8 7 9 3 8 8 8 8 9 2       Lab Results   Component Value Date    NTBNP 4,179 (H) 06/27/2022    NTBNP 4,406 (H) 06/06/2022    NTBNP 3,158 (H) 10/22/2021        Results from last 7 days   Lab Units 07/25/22  0522 07/24/22  0508   MAGNESIUM mg/dL 1 6* 1 6*                         Lipid Profile:   No results found for: CHOL  Lab Results   Component Value Date    HDL 55 10/23/2021    HDL 45 (L) 04/02/2021    HDL 44 05/26/2020     Lab Results   Component Value Date    LDLCALC 36 10/23/2021    LDLCALC 44 04/02/2021    LDLCALC 68 05/26/2020     Lab Results   Component Value Date    TRIG 75 10/23/2021    TRIG 133 7 04/02/2021    TRIG 157 (H) 05/26/2020       Cardiac testing:   No results found for this or any previous visit  No results found for this or any previous visit  No results found for this or any previous visit  No valid procedures specified  No results found for this or any previous visit        Meds/Allergies   all current active meds have been reviewed and current meds:   Current Facility-Administered Medications   Medication Dose Route Frequency    acetaminophen (TYLENOL) tablet 975 mg  975 mg Oral Q6H [facility]    albuterol inhalation solution 2 5 mg  2 5 mg Nebulization Q4H PRN    amiodarone tablet 100 mg  100 mg Oral Daily With Breakfast    apixaban (ELIQUIS) tablet 5 mg  5 mg Oral BID    atorvastatin (LIPITOR) tablet 40 mg  40 mg Oral Daily With Dinner    benzonatate (TESSALON PERLES) capsule 100 mg  100 mg Oral TID PRN    cefepime (MAXIPIME) IVPB (premix in dextrose) 1,000 mg 50 mL  1,000 mg Intravenous Q12H    diazepam (VALIUM) tablet 5 mg  5 mg Oral Q6H PRN    escitalopram (LEXAPRO) tablet 10 mg  10 mg Oral Daily    fentanyl citrate (PF) 100 MCG/2ML   Intravenous Code/Trauma/Sedation Med    ferrous sulfate tablet 325 mg  325 mg Oral Daily With Breakfast    guaiFENesin (MUCINEX) 12 hr tablet 1,200 mg  1,200 mg Oral Q12H [address]    HYDROmorphone (DILAUDID) injection 0 5 mg  0 5 mg Intravenous Q2H PRN    HYDROmorphone (DILAUDID) tablet 2 mg  2 mg Oral Q4H PRN    HYDROmorphone (DILAUDID) tablet 4 mg  4 mg Oral Q4H PRN    insulin glargine (LANTUS) subcutaneous injection 33 Units 0 33 mL  33 Units Subcutaneous HS    insulin lispro (HumaLOG) 100 units/mL subcutaneous injection 18 Units  18 Units Subcutaneous TID With Meals    insulin lispro (HumaLOG) 100 units/mL subcutaneous injection 2-12 Units  2-12 Units Subcutaneous TID AC    insulin lispro (HumaLOG) 100 units/mL subcutaneous injection 2-12 Units  2-12 Units Subcutaneous HS    ipratropium (ATROVENT) 0 02 % inhalation solution 0 5 mg  0 5 mg Nebulization TID    levalbuterol (XOPENEX) inhalation solution 1 25 mg  1 25 mg Nebulization TID    lidocaine (LIDODERM) 5 % patch 2 patch  2 patch Topical Daily    losartan (COZAAR) tablet 50 mg  50 mg Oral Daily    methocarbamol (ROBAXIN) tablet 500 mg  500 mg Oral Q6H [address]    metoprolol succinate (TOPROL-XL) 24 hr tablet 50 mg  50 mg Oral Q12H    pantoprazole (PROTONIX) EC tablet 40 mg  40 mg Oral Early Morning    pregabalin (LYRICA) capsule 25 mg  25 mg Oral QPM    pregabalin (LYRICA) capsule 75 mg  75 mg Oral Daily    spironolactone (ALDACTONE) tablet 100 mg  100 mg Oral Daily    ticagrelor (BRILINTA) tablet 90 mg  90 mg Oral Q12H    trimethobenzamide (TIGAN) IM injection 200 mg  200 mg Intramuscular Q6H PRN    vancomycin (VANCOCIN) IVPB (premix in dextrose) 1,000 mg 200 mL  12 5 mg/kg Intravenous Q12H          EKG personally reviewed by NEELAM Esposito      Assessment:  Principal Problem:    Respiratory Distress  Active Problems:    Type 2 diabetes mellitus with hyperglycemia (HCC)    A-fib ([facility])    History of Cardiac arrest (HCC)    CAD (coronary artery disease)    Tracheostomy in place [facility])    Acute on chronic HFrEF (heart failure with reduced ejection fraction) ([facility])    Hypokalemia Leukocytosis    Counseling / Coordination of Care  Total floor / unit time spent today 20 minutes  Greater than 50% of total time was spent with the patient and / or family counseling and / or coordination of care  ** Please Note: Dragon 360 Dictation voice to text software may have been used in the creation of this document   **

## 2022-07-27 NOTE — PROGRESS NOTES
[facility]  Progress Note - Candi Marrufo 1965, 65 y o  female MRN: [id number]  Unit/Bed#: S -01 Encounter: [id number]  Primary Care Provider: Ana Maria Andrade MD   Date and time admitted to hospital: 7/19/2022 11:33 AM    Respiratory Distress  Assessment & Plan  · Patient noted to be significantly more dyspneic and tachypneic on the morning of July 26 with minimal exertion of going to the bedside commode  · She had already been aggressively diuresed with IV Bumex  · She also reported increased phlegm/tracheal secretions and procalcitonin was newly noted to be elevated at 0 96--now on day 2 broad-spectrum empiric antibiotics including IV cefepime and vancomycin based on previous culture showing MRSA in sputum, recent chest tube and frequent hospitalizations  · Continue aggressive respiratory protocol with suctioning  · Spoke with pulmonology and Cardiology  · Updated x-ray with atelectasis    * Persistent pain despite chest tube removal  Assessment & Plan  · Patient feels pain along the track of the prior chest tube despite being removed 2 days ago  Reviewed this with pain management team who will come back and reassess the patient to determine if she would be appropriate for a nerve block  · In the meantime would recommend maximizing nonnarcotic pain control measures including muscle relaxants; nurse providing a dose of Valium now and I have increased her Lyrica an added Lidoderm patch    Primary spontaneous pneumothorax-resolved as of 7/25/2022  Assessment & Plan  · Patient noted on imaging to have small pneumothorax at [facility], [facility]  · Chest tube placed 7/20, then upsized on 7/22 given no significant change and appearance that it was kinked  · Chest x-ray performed 7/24 -no pneumothorax at that time    Chest tube was subsequently removed      Tracheostomy in place [facility])  Assessment & Plan  · Trach placed in the context of cardiac arrest/prolonged ventilator dependent state November 2021  · Decannulated at [facility] 12/11/21  · Patient requires frequent tracheostomy changes and suctioning  · Per discussion with speech therapy,  video barium swallow was done for sense of food getting stuck  Appropriate for regular diet  · On trach collar O2 with humidification      Type 2 diabetes mellitus with hyperglycemia [facility])  Assessment & Plan  Lab Results   Component Value Date    HGBA1C 12 7 (H) 05/22/2022     Recent Labs     07/26/22  1143 07/26/22  1549 07/26/22  2109 07/27/22  0704   POCGLU 223* 181* 214* 185*     Blood Sugar Average: Last 72 hrs:  · (P) 390 [id number]   · Very poorly controlled based on A1c but current blood sugars reasonable on current regimen of basal bolus insulin; continue to make small titration adjustments as needed  · ADA diet    Acute on chronic HFrEF (heart failure with reduced ejection fraction) (HCC)-resolved as of 7/27/2022  Assessment & Plan  Wt Readings from Last 3 Encounters:   07/27/22 76 9 kg (169 lb 9 6 oz)   07/19/22 78 2 kg (172 lb 6 4 oz)   06/29/22 81 7 kg (180 lb 3 2 oz)   · EF noted to be 50% on last echocardiogram 2/22  · Admitted to [facility] for CHF exac on 7/15 - given 80 mg IV lasix and then placed back on bumex 2 mg PO BID  · Developed MODESTO and thus bumex dose reduced - got 2 mg on 7/19, no bumex on on 7/20, 1 mg on 7/21, 2 mg on 7/23, 3 mg on 7/23 (home dose had been bumex 2 mg PO BID)  · 7/24 AM with increasing SOB, oxygen requiements - given 1 mg IV bumex with 4-5 episodes of urine output (amt not recorded)   · CXR showed mild vascular congestion  · Suspected iatrogenic volume overload in the setting of reduced/withheld diuretic dosing  · Consulted cardiology, appreciate their input  Patient received several doses IV Bumex    Currently back to PO Bumex    Acute kidney injury superimposed on chronic kidney disease stage 3 (HCC)-resolved as of 7/25/2022  Assessment & Plan  Lab Results   Component Value Date    EGFR 58 07/27/2022    EGFR 51 07/26/2022    EGFR 73 07/25/2022    CREATININE 1 06 07/27/2022    CREATININE 1 18 07/26/2022    CREATININE 0 88 07/25/2022   · Creatinine elevated at 1 64 upon transfer on 7/19/22, with baseline being 0 9-1 0  · Trended down to 0 88, but now stable at 1 06    · Monitor carefully  · Resumed spironolactone as well as losartan on 7/23    A-fib [facility])  Assessment & Plan  · On anticoagulation with Eliquis   · On amiodarone and Toprol which will be continued    Hypokalemia-resolved as of 7/27/2022  Assessment & Plan  · Potassium currently 4  · Resumed spironolactone and ARB 7/23  · Careful with oral potassium repletion  · Goal for K >4 given history of VT cardiac arrest  · K 6 1 on 7/20/22  · Was withheld from supplemental potassium, spironolactone and ARB and dose of Kayexalate was given    CAD (coronary artery disease)  Assessment & Plan  · STEMI 10/22/2021 s/p PATRICA mid circumflex OM1  OM2 was ballooned but not stented  · Pulseless VT 10/27/21 - repeat LHC 90% mid circumflex stenosis, but could not be intervened on  · VT 10/28/21 LHC:  found to have apical LAD complete occlusion was felt to be small to be intervened    · Cardiac carrest 1/1/22 - NO cardiac cath at [facility] felt to be hypoxic vs  VT induced  · On beta-blocker, Brilinta and Lipitor      History of Cardiac arrest [facility])  Assessment & Plan  · STEMI 10/22/21 - PATRICA to mid circumflex  · VT arrest 10/26   · Polymorphic VT x 1 shock 10/28/21  · ICD not placed given risks felt to outweigh benefits with cognitive function and risk of infection at that that time  · Cardiac arrest 1/1/22 - likely VT related - went to [facility]  · No ICD given lung infection and on IV ABX x 4 weeks  · Canceled 2 EP appts since that time and thus no ICD in place - still wears LifeVest  · EF seems to have always been 40% or higher      VTE Pharmacologic Prophylaxis: VTE Score: 3 eliquis    Patient Centered Rounds: I performed bedside rounds with nursing staff today  Discussions with Specialists or Other Care Team Provider:  Case management, pulmonology, pain management    Education and Discussions with Family / Patient: Patient declined call to   Time Spent for Care: 30 minutes  More than 50% of total time spent on counseling and coordination of care as described above  Current Length of Stay: 8 day(s)  Current Patient Status: Inpatient   Certification Statement: The patient will continue to require additional inpatient hospital stay due to Ongoing IV antibiotics and pain issues  Discharge Plan: Home when cleared by pulmonology and pain improves    Code Status: Level 1 - Full Code    Subjective:   Patient still reporting significant amount of pain with some tenderness at this site anteriorly of the previous chest tube but also with pain along the right flank and back with patient describing it feeling as if the pain is along the tract of where the tube was  Nursing reporting she is still requiring strong pain medication  Patient also with significant tracheal secretions but denies feeling need to be suctioned at this time or any increased significant shortness of breath this morning    Objective:     Vitals:   Temp (24hrs), Av 8 °F (37 1 °C), Min:98 2 °F (36 8 °C), Max:99 4 °F (37 4 °C)    Temp:  [98 2 °F (36 8 °C)-99 4 °F (37 4 °C)] 98 6 °F (37 °C)  HR:  [68-84] 75  Resp:  [14-23] 14  BP: ()/(63-83) 112/63  SpO2:  [93 %-99 %] 97 %  Body mass index is 25 05 kg/m²  Input and Output Summary (last 24 hours): Intake/Output Summary (Last 24 hours) at 2022 1110  Last data filed at 2022 0351  Gross per 24 hour   Intake --   Output 850 ml   Net -850 ml       Physical Exam:   Physical Exam  Vitals reviewed  Constitutional:       General: She is not in acute distress  Appearance: She is not toxic-appearing or diaphoretic  Comments: Visibly appears to be uncomfortable and in pain    Appears older than stated age   Eyes:      General: No scleral icterus  Right eye: No discharge  Left eye: No discharge  Conjunctiva/sclera: Conjunctivae normal    Neck:      Comments: Tracheal secretions, upper airway rhonchorous breath sounds  Cardiovascular:      Rate and Rhythm: Normal rate  Heart sounds: No murmur heard  Pulmonary:      Effort: No respiratory distress  Breath sounds: Rhonchi present  No wheezing  Comments: Patient does not appear as dyspneic or tachypneic today as she did yesterday  Chest:      Chest wall: Tenderness present  Abdominal:      General: There is no distension  Palpations: Abdomen is soft  Tenderness: There is no abdominal tenderness  There is no guarding  Musculoskeletal:         General: Tenderness (Right upper back/flank region) present  No deformity or signs of injury  Right lower leg: No edema  Left lower leg: No edema  Skin:     General: Skin is warm and dry  Coloration: Skin is not jaundiced or pale  Findings: No bruising, erythema, lesion or rash  Neurological:      General: No focal deficit present  Mental Status: She is alert  Mental status is at baseline        Comments: Awake alert interactive with no confusion   Psychiatric:         Mood and Affect: Mood normal         Additional Data:     Labs:  Results from last 7 days   Lab Units 07/26/22  0507   WBC Thousand/uL 12 70*   HEMOGLOBIN g/dL 9 7*   HEMATOCRIT % 32 1*   PLATELETS Thousands/uL 554*   NEUTROS PCT % 78*   LYMPHS PCT % 10*   MONOS PCT % 9   EOS PCT % 2     Results from last 7 days   Lab Units 07/27/22  0619 07/26/22  0507 07/25/22  0522   SODIUM mmol/L 132*   < > 135   POTASSIUM mmol/L 3 8   < > 3 9   CHLORIDE mmol/L 94*   < > 98   CO2 mmol/L 27   < > 27   BUN mg/dL 35*   < > 31*   CREATININE mg/dL 1 06   < > 0 88   ANION GAP mmol/L 11   < > 10   CALCIUM mg/dL 8 7   < > 8 7   ALBUMIN g/dL  --   --  2 8*   TOTAL BILIRUBIN mg/dL  --   --  0 69   ALK PHOS U/L  --   --  96   ALT U/L  --   --  7   AST U/L  --   --  10*   GLUCOSE RANDOM mg/dL 177*   < > 120    < > = values in this interval not displayed  Results from last 7 days   Lab Units 07/27/22  0704 07/26/22  2109 07/26/22  1549 07/26/22  1143 07/25/22  2118 07/25/22  1746 07/25/22  1608 07/25/22  1115 07/25/22  0743 07/24/22  2052 07/24/22  1620 07/24/22  1200   POC GLUCOSE mg/dl 185* 214* 181* 223* 266* 172* 79 135 125 107 190* 153*         Results from last 7 days   Lab Units 07/27/22  0619 07/26/22  0507   PROCALCITONIN ng/ml 0 68* 0 96*       Lines/Drains:  Invasive Devices  Report    Peripheral Intravenous Line  Duration           Peripheral IV 07/24/22 Right; Lower Forearm 3 days          Airway  Duration           Surgical Airway Shiley Fenestrated 147 days                  Telemetry:  Telemetry Orders (From admission, onward)             LifeVest Patient: Continuous Telemetry Monitoring during hospitalization (non-expiring)  Continuous LifeVest Telemetry Monitoring        References:    LifeVest Policy                 Telemetry Reviewed:unremarkable  Indication for Continued Telemetry Use: lifevest             Imaging:   Recent Cultures (last 7 days):   Results from last 7 days   Lab Units 07/26/22  1349   GRAM STAIN RESULT  1+ Epithelial cells per low power field*  3+ Polys*  1+ Gram positive cocci in pairs*  Rare Gram positive rods*       Last 24 Hours Medication List:   Current Facility-Administered Medications   Medication Dose Route Frequency Provider Last Rate    acetaminophen  975 mg Oral Q6H [address] Leda Chand PA-C      albuterol  2 5 mg Nebulization Q4H PRN Leda Chand PA-C      amiodarone  100 mg Oral Daily With Breakfast Leda Chand PA-C      apixaban  5 mg Oral BID Leda Chand PA-C      atorvastatin  40 mg Oral Daily With [employer] InstrumentsDONALD      benzonatate  100 mg Oral TID PRN Edel Anne MD      cefepime  1,000 mg Intravenous Q12H Leda Mannie, PA-C 1,000 mg (07/27/22 1107)    diazepam  5 mg Oral Q6H PRN Etta Nelson, DONALD      escitalopram  10 mg Oral Daily Leda Chand, DONALD      fentanyl citrate (PF)   Intravenous Code/Trauma/Sedation Barrington Flores MD      ferrous sulfate  325 mg Oral Daily With Breakfast Leda Chand, DONALD      guaiFENesin  1,200 mg Oral Q12H [address] Leda Chand PA-C      HYDROmorphone  0 5 mg Intravenous Q2H PRN Jagdish Archer MD      HYDROmorphone  2 mg Oral Q4H PRN Emmie Pereyra, DONALD      HYDROmorphone  4 mg Oral Q4H PRN Etta Nelson, DONALD      insulin glargine  35 Units Subcutaneous HS Leda Chand, PA-GAIL      insulin lispro  18 Units Subcutaneous TID With Meals Etta Nelson, DONALD      insulin lispro  2-12 Units Subcutaneous TID AC Leda Chand, PA-GAIL      insulin lispro  2-12 Units Subcutaneous HS Leda Chand, PA-GAIL      ipratropium  0 5 mg Nebulization TID Leda Chand, PA-GAIL      levalbuterol  1 25 mg Nebulization TID Jillian El, DONALD      lidocaine  2 patch Topical Daily Leda Chand, PA-C      losartan  50 mg Oral Daily Etta Nelson, PA-GAIL      methocarbamol  500 mg Oral Q6H [address] Jagdish Rodríguez MD      metoprolol succinate  50 mg Oral Q12H Lead Chand, PA-C      pantoprazole  40 mg Oral Early Morning Leda Chand, PA-C      potassium chloride  20 mEq Oral Daily Leda Chand, PA-C      pregabalin  25 mg Oral QPM Leda Chand, PA-C      pregabalin  75 mg Oral Daily Leda Chand, PA-C      spironolactone  100 mg Oral Daily Etta Nelson, PA-C      ticagrelor  90 mg Oral Q12H Leda Chand, PA-C      trimethobenzamide  200 mg Intramuscular Q6H PRN Rolando To, PA-C      vancomycin  12 5 mg/kg Intravenous Q12H Leda Chand, PA-C 1,000 mg (07/27/22 0831)        Today, Patient Was Seen By: Jillian Bauer PA-C    **Please Note: This note may have been constructed using a voice recognition system  **

## 2022-07-27 NOTE — ASSESSMENT & PLAN NOTE
· Patient noted on imaging to have small pneumothorax at [facility], subsequently transferred to [facility]  · Chest tube placed 7/20, then upsized on 7/22 given no significant change and appearance that it was kinked  · Chest x-ray performed 7/24 -no pneumothorax at that time    Chest tube was subsequently removed

## 2022-07-27 NOTE — ASSESSMENT & PLAN NOTE
Lab Results   Component Value Date    EGFR 58 07/27/2022    EGFR 51 07/26/2022    EGFR 73 07/25/2022    CREATININE 1 06 07/27/2022    CREATININE 1 18 07/26/2022    CREATININE 0 88 07/25/2022   · Creatinine elevated at 1 64 upon transfer on 7/19/22, with baseline being 0 9-1 0  · Trended down to 0 88, but now stable at 1 06    · Monitor carefully  · Resumed spironolactone as well as losartan on 7/23

## 2022-07-27 NOTE — ASSESSMENT & PLAN NOTE
Lab Results   Component Value Date    HGBA1C 12 7 (H) 05/22/2022     Recent Labs     07/26/22  1143 07/26/22  1549 07/26/22  2109 07/27/22  0704   POCGLU 223* 181* 214* 185*     Blood Sugar Average: Last 72 hrs:  · (P) 539 [id number]   · Very poorly controlled based on A1c but current blood sugars reasonable on current regimen of basal bolus insulin; continue to make small titration adjustments as needed  · ADA diet

## 2022-07-27 NOTE — PROGRESS NOTES
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, HFrEF 2/2 ICM with EF recovery (last EF 50% in 02/2022 per TTE), T2DM and CKD III who presented with right (likely primary) PTX s/p chest tube placement on the 7/20  She underwent a right ES plane block with exparel on 7/23 with limited analgesic benefit (6-8 hours)  The chest tube was subsequently removed on 7/24 c/b persistent chest tube site pain despite removal  APS re-consulted to assist with acute pain needs  Upon bedside evaluation today, Rj Richards reports persistent pain localized to right anterior chest region where the chest tube was  Pain radiates deep into her chest with worsening upon movement  PO dilaudid alleviates the pain temporarily  Denies opioid-induced side effects including nausea/vomiting/itching/constipation  Plan: Etiology is unclear as chest tube was removed 3 day ago  The inflammation should have subsided by now but there could be underlying right pectoralis muscle spasms related to previous chest tube placement      - Will trial voltaren gel 1% at chest tube removal site to tackle potential inflammatory effect  - If voltaren gel doesn't work, can trial lidoderm patch over previous chest tube site  - If the patches and gels are not effective, can consider IV ketamine infusion tomorrow (patient wants to trial gels/patches today)  - Please trial the PO valium 5mg q6hr PRN to tackle underlying potential underlying muscle spasms  - Continue other MMA: PO tylenol 975mg q8, PO Lyrica 75/25mg BID, PO dilaudid 2/4mg q4hr PRN for moderate-severe pain, IV dilaudid 0 5mg q2hr PRN for breakthrough    Bowel Regimen:  - Recommend colace BID and senna qd while on opioids    APS will continue to follow  Please contact Acute Pain Service - [facility] via [employer] from [phone] with additional questions or concerns  See [place] or Shira for additional contacts and after hours information  Pain History  Current pain location(s): Right anterior chest wall where chest tube was previously located  Pain Scale:   5/10  Quality: Sharp, radiating  24 hour history: See above    Opioid requirement previous 24 hours: None    Meds/Allergies   all current active meds have been reviewed    Allergies   Allergen Reactions    Metformin Diarrhea    Clonidine Rash     Patch        Objective     Temp:  [97 3 °F (36 3 °C)-99 6 °F (37 6 °C)] 97 3 °F (36 3 °C)  HR:  [69-84] 75  Resp:  [14-23] 18  BP: ()/(63-83) 119/66  FiO2 (%):  [28-30] 28    Physical Exam  Constitutional:       Appearance: She is ill-appearing  HENT:      Head: Normocephalic  Mouth/Throat:      Mouth: Mucous membranes are moist    Eyes:      Pupils: Pupils are equal, round, and reactive to light  Neck:      Comments: Tracheostomy present, on trach collar  Cardiovascular:      Rate and Rhythm: Normal rate  Pulses: Normal pulses  Pulmonary:      Effort: Pulmonary effort is normal       Breath sounds: Rhonchi present  Abdominal:      Palpations: Abdomen is soft  Musculoskeletal:         General: Tenderness (R pectoral area where chest tube was) present  Skin:     General: Skin is dry  Neurological:      General: No focal deficit present  Mental Status: She is alert and oriented to person, place, and time     Psychiatric:         Mood and Affect: Mood normal          Lab Results:   Results from last 7 days   Lab Units 07/26/22  0507   WBC Thousand/uL 12 70*   HEMOGLOBIN g/dL 9 7*   HEMATOCRIT % 32 1*   PLATELETS Thousands/uL 554*      Results from last 7 days   Lab Units 07/27/22  0619 07/26/22  0507 07/25/22  0522   POTASSIUM mmol/L 3 8   < > 3 9   CHLORIDE mmol/L 94*   < > 98   CO2 mmol/L 27   < > 27   BUN mg/dL 35*   < > 31*   CREATININE mg/dL 1 06   < > 0 88   CALCIUM mg/dL 8 7   < > 8 7   ALK PHOS U/L  --   --  96   ALT U/L  --   --  7   AST U/L  --   --  10*    < > = values in this interval not displayed  Imaging Studies: I have personally reviewed pertinent reports  EKG, Pathology, and Other Studies: I have personally reviewed pertinent reports  Counseling / Coordination of Care  Total floor / unit time spent today 20 minutes  Greater than 50% of total time was spent with the patient and / or family counseling and / or coordination of care  Please note that the APS provides consultative services regarding pain management only  With the exception of ketamine and epidural infusions and except when indicated, final decisions regarding starting or changing doses of analgesic medications are at the discretion of the consulting service  Off hours consultation and/or medication management is generally not available      La Nena Tran MD  Acute Pain Service

## 2022-07-28 ENCOUNTER — APPOINTMENT (INPATIENT)
Dept: CT IMAGING | Facility: HOSPITAL | Age: 57
DRG: 199 | End: 2022-07-28
Payer: COMMERCIAL

## 2022-07-28 LAB
ANION GAP SERPL CALCULATED.3IONS-SCNC: 8 MMOL/L (ref 4–13)
BASOPHILS # BLD AUTO: 0.03 THOUSANDS/ΜL (ref 0–0.1)
BASOPHILS NFR BLD AUTO: 0 % (ref 0–1)
BUN SERPL-MCNC: 43 MG/DL (ref 5–25)
CALCIUM SERPL-MCNC: 8.4 MG/DL (ref 8.4–10.2)
CHLORIDE SERPL-SCNC: 95 MMOL/L (ref 96–108)
CO2 SERPL-SCNC: 26 MMOL/L (ref 21–32)
CREAT SERPL-MCNC: 1.08 MG/DL (ref 0.6–1.3)
EOSINOPHIL # BLD AUTO: 0.15 THOUSAND/ΜL (ref 0–0.61)
EOSINOPHIL NFR BLD AUTO: 1 % (ref 0–6)
ERYTHROCYTE [DISTWIDTH] IN BLOOD BY AUTOMATED COUNT: 17.3 % (ref 11.6–15.1)
GFR SERPL CREATININE-BSD FRML MDRD: 57 ML/MIN/1.73SQ M
GLUCOSE SERPL-MCNC: 112 MG/DL (ref 65–140)
GLUCOSE SERPL-MCNC: 148 MG/DL (ref 65–140)
GLUCOSE SERPL-MCNC: 198 MG/DL (ref 65–140)
GLUCOSE SERPL-MCNC: 205 MG/DL (ref 65–140)
GLUCOSE SERPL-MCNC: 297 MG/DL (ref 65–140)
HCT VFR BLD AUTO: 26.7 % (ref 34.8–46.1)
HGB BLD-MCNC: 8.4 G/DL (ref 11.5–15.4)
IMM GRANULOCYTES # BLD AUTO: 0.22 THOUSAND/UL (ref 0–0.2)
IMM GRANULOCYTES NFR BLD AUTO: 1 % (ref 0–2)
LYMPHOCYTES # BLD AUTO: 1.05 THOUSANDS/ΜL (ref 0.6–4.47)
LYMPHOCYTES NFR BLD AUTO: 6 % (ref 14–44)
MCH RBC QN AUTO: 23.6 PG (ref 26.8–34.3)
MCHC RBC AUTO-ENTMCNC: 31.5 G/DL (ref 31.4–37.4)
MCV RBC AUTO: 75 FL (ref 82–98)
MONOCYTES # BLD AUTO: 1.55 THOUSAND/ΜL (ref 0.17–1.22)
MONOCYTES NFR BLD AUTO: 9 % (ref 4–12)
NEUTROPHILS # BLD AUTO: 14.34 THOUSANDS/ΜL (ref 1.85–7.62)
NEUTS SEG NFR BLD AUTO: 83 % (ref 43–75)
NRBC BLD AUTO-RTO: 0 /100 WBCS
PLATELET # BLD AUTO: 487 THOUSANDS/UL (ref 149–390)
PMV BLD AUTO: 10 FL (ref 8.9–12.7)
POTASSIUM SERPL-SCNC: 4.4 MMOL/L (ref 3.5–5.3)
RBC # BLD AUTO: 3.56 MILLION/UL (ref 3.81–5.12)
SODIUM SERPL-SCNC: 129 MMOL/L (ref 135–147)
WBC # BLD AUTO: 17.34 THOUSAND/UL (ref 4.31–10.16)

## 2022-07-28 PROCEDURE — 99223 1ST HOSP IP/OBS HIGH 75: CPT | Performed by: INTERNAL MEDICINE

## 2022-07-28 PROCEDURE — G1004 CDSM NDSC: HCPCS

## 2022-07-28 PROCEDURE — 85025 COMPLETE CBC W/AUTO DIFF WBC: CPT | Performed by: PHYSICIAN ASSISTANT

## 2022-07-28 PROCEDURE — NC001 PR NO CHARGE: Performed by: NURSE PRACTITIONER

## 2022-07-28 PROCEDURE — 71250 CT THORAX DX C-: CPT

## 2022-07-28 PROCEDURE — 94760 N-INVAS EAR/PLS OXIMETRY 1: CPT

## 2022-07-28 PROCEDURE — 87186 SC STD MICRODIL/AGAR DIL: CPT | Performed by: PHYSICIAN ASSISTANT

## 2022-07-28 PROCEDURE — 99233 SBSQ HOSP IP/OBS HIGH 50: CPT | Performed by: INTERNAL MEDICINE

## 2022-07-28 PROCEDURE — 87205 SMEAR GRAM STAIN: CPT | Performed by: PHYSICIAN ASSISTANT

## 2022-07-28 PROCEDURE — 82948 REAGENT STRIP/BLOOD GLUCOSE: CPT

## 2022-07-28 PROCEDURE — 99232 SBSQ HOSP IP/OBS MODERATE 35: CPT | Performed by: ANESTHESIOLOGY

## 2022-07-28 PROCEDURE — 80048 BASIC METABOLIC PNL TOTAL CA: CPT | Performed by: PHYSICIAN ASSISTANT

## 2022-07-28 PROCEDURE — 94640 AIRWAY INHALATION TREATMENT: CPT

## 2022-07-28 PROCEDURE — 99232 SBSQ HOSP IP/OBS MODERATE 35: CPT | Performed by: PHYSICIAN ASSISTANT

## 2022-07-28 PROCEDURE — 87070 CULTURE OTHR SPECIMN AEROBIC: CPT | Performed by: PHYSICIAN ASSISTANT

## 2022-07-28 RX ORDER — INSULIN GLARGINE 100 [IU]/ML
38 INJECTION, SOLUTION SUBCUTANEOUS
Status: DISCONTINUED | OUTPATIENT
Start: 2022-07-28 | End: 2022-07-30

## 2022-07-28 RX ORDER — CEFEPIME HYDROCHLORIDE 2 G/50ML
2000 INJECTION, SOLUTION INTRAVENOUS EVERY 12 HOURS
Status: DISCONTINUED | OUTPATIENT
Start: 2022-07-28 | End: 2022-08-01

## 2022-07-28 RX ORDER — ACETAMINOPHEN 325 MG/1
975 TABLET ORAL EVERY 8 HOURS SCHEDULED
Status: DISCONTINUED | OUTPATIENT
Start: 2022-07-28 | End: 2022-08-02 | Stop reason: HOSPADM

## 2022-07-28 RX ORDER — INSULIN LISPRO 100 [IU]/ML
19 INJECTION, SOLUTION INTRAVENOUS; SUBCUTANEOUS
Status: DISCONTINUED | OUTPATIENT
Start: 2022-07-28 | End: 2022-07-30

## 2022-07-28 RX ADMIN — LEVALBUTEROL HYDROCHLORIDE 1.25 MG: 1.25 SOLUTION, CONCENTRATE RESPIRATORY (INHALATION) at 14:25

## 2022-07-28 RX ADMIN — APIXABAN 5 MG: 5 TABLET, FILM COATED ORAL at 17:29

## 2022-07-28 RX ADMIN — DICLOFENAC SODIUM TOPICAL GEL, 1%, 2 G: 10 GEL TOPICAL at 17:34

## 2022-07-28 RX ADMIN — GUAIFENESIN 1200 MG: 600 TABLET ORAL at 20:21

## 2022-07-28 RX ADMIN — SPIRONOLACTONE 100 MG: 100 TABLET ORAL at 08:07

## 2022-07-28 RX ADMIN — LEVALBUTEROL HYDROCHLORIDE 1.25 MG: 1.25 SOLUTION, CONCENTRATE RESPIRATORY (INHALATION) at 19:29

## 2022-07-28 RX ADMIN — HYDROMORPHONE HYDROCHLORIDE 0.5 MG: 1 INJECTION, SOLUTION INTRAMUSCULAR; INTRAVENOUS; SUBCUTANEOUS at 20:19

## 2022-07-28 RX ADMIN — LEVALBUTEROL HYDROCHLORIDE 1.25 MG: 1.25 SOLUTION, CONCENTRATE RESPIRATORY (INHALATION) at 08:18

## 2022-07-28 RX ADMIN — INSULIN LISPRO 19 UNITS: 100 INJECTION, SOLUTION INTRAVENOUS; SUBCUTANEOUS at 12:00

## 2022-07-28 RX ADMIN — ESCITALOPRAM OXALATE 10 MG: 10 TABLET ORAL at 08:06

## 2022-07-28 RX ADMIN — PANTOPRAZOLE SODIUM 40 MG: 40 TABLET, DELAYED RELEASE ORAL at 05:04

## 2022-07-28 RX ADMIN — IPRATROPIUM BROMIDE 0.5 MG: 0.5 SOLUTION RESPIRATORY (INHALATION) at 08:18

## 2022-07-28 RX ADMIN — INSULIN GLARGINE 38 UNITS: 100 INJECTION, SOLUTION SUBCUTANEOUS at 21:22

## 2022-07-28 RX ADMIN — INSULIN LISPRO 18 UNITS: 100 INJECTION, SOLUTION INTRAVENOUS; SUBCUTANEOUS at 08:08

## 2022-07-28 RX ADMIN — DICLOFENAC SODIUM TOPICAL GEL, 1%, 2 G: 10 GEL TOPICAL at 21:22

## 2022-07-28 RX ADMIN — CEFEPIME HYDROCHLORIDE 1000 MG: 1 INJECTION, SOLUTION INTRAVENOUS at 08:09

## 2022-07-28 RX ADMIN — ACETAMINOPHEN 975 MG: 325 TABLET ORAL at 21:22

## 2022-07-28 RX ADMIN — VANCOMYCIN HYDROCHLORIDE 750 MG: 750 INJECTION, SOLUTION INTRAVENOUS at 21:23

## 2022-07-28 RX ADMIN — INSULIN LISPRO 6 UNITS: 100 INJECTION, SOLUTION INTRAVENOUS; SUBCUTANEOUS at 11:59

## 2022-07-28 RX ADMIN — INSULIN LISPRO 2 UNITS: 100 INJECTION, SOLUTION INTRAVENOUS; SUBCUTANEOUS at 08:09

## 2022-07-28 RX ADMIN — GUAIFENESIN 1200 MG: 600 TABLET ORAL at 08:07

## 2022-07-28 RX ADMIN — DIAZEPAM 5 MG: 5 TABLET ORAL at 08:09

## 2022-07-28 RX ADMIN — PREGABALIN 25 MG: 25 CAPSULE ORAL at 17:29

## 2022-07-28 RX ADMIN — CEFEPIME HYDROCHLORIDE 2000 MG: 2 INJECTION, SOLUTION INTRAVENOUS at 20:20

## 2022-07-28 RX ADMIN — PREGABALIN 75 MG: 75 CAPSULE ORAL at 08:07

## 2022-07-28 RX ADMIN — VANCOMYCIN HYDROCHLORIDE 750 MG: 750 INJECTION, SOLUTION INTRAVENOUS at 10:26

## 2022-07-28 RX ADMIN — TICAGRELOR 90 MG: 90 TABLET ORAL at 05:04

## 2022-07-28 RX ADMIN — AMIODARONE HYDROCHLORIDE 100 MG: 200 TABLET ORAL at 08:07

## 2022-07-28 RX ADMIN — HYDROMORPHONE HYDROCHLORIDE 2 MG: 2 TABLET ORAL at 21:35

## 2022-07-28 RX ADMIN — IPRATROPIUM BROMIDE 0.5 MG: 0.5 SOLUTION RESPIRATORY (INHALATION) at 14:25

## 2022-07-28 RX ADMIN — IPRATROPIUM BROMIDE 0.5 MG: 0.5 SOLUTION RESPIRATORY (INHALATION) at 19:28

## 2022-07-28 RX ADMIN — HYDROMORPHONE HYDROCHLORIDE 2 MG: 2 TABLET ORAL at 17:34

## 2022-07-28 RX ADMIN — DESMOPRESSIN ACETATE 40 MG: 0.2 TABLET ORAL at 17:29

## 2022-07-28 RX ADMIN — ACETAMINOPHEN 975 MG: 325 TABLET ORAL at 05:03

## 2022-07-28 RX ADMIN — APIXABAN 5 MG: 5 TABLET, FILM COATED ORAL at 08:06

## 2022-07-28 RX ADMIN — LIDOCAINE 5% 2 PATCH: 700 PATCH TOPICAL at 08:06

## 2022-07-28 RX ADMIN — LOSARTAN POTASSIUM 50 MG: 50 TABLET, FILM COATED ORAL at 08:06

## 2022-07-28 RX ADMIN — TICAGRELOR 90 MG: 90 TABLET ORAL at 17:28

## 2022-07-28 NOTE — CONSULTS
Consultation - Infectious Disease   damonica Fabiola 65 y o  female MRN: [id number]  Unit/Bed#: S -01 Encounter: [id number]      IMPRESSION & RECOMMENDATIONS:   Impression/Recommendations:  1  Developing sepsis  Tachypnea, leukocytosis  Secondary to #2  With worsening leukocytosis likely due to source control issue  Otherwise remains hemodynamically stable     -continue IV vancomycin for now with dosing recommendations for pharmacy  -continue IV cefepime  Increase to 2 g q 12 hours  -follow temperatures and hemodynamics  -follow CBC    2  Loculated right pleural effusion/possible empyema  Most recent CT shows new partially loculated right pleural effusion  No chest wall or lung abscesses  Suspect will need new chest tube  For now, agree with broader coverage pending cultures of pleural fluid  Sputum culture from 07/26 so far showing MSSA and Pseudomonas     -antibiotics as above  -plan for thoracentesis versus chest tube placement  -IR/pulmonology follow-up ongoing  -follow-up final sputum culture    3  Recent spontaneous pneumothorax  Status post chest tube placement on 07/20, removal on 07/24  Resolved on repeat imaging  4  Acute respiratory insufficiency, on chronic hypoxic respiratory failure/tracheostomy dependence  Multifactorial in the setting of recent pneumothorax, new right pleural effusion, acute CHF, COPD  -antibiotic plan as above  -monitor O2 requirements  -pulmonology follow-up ongoing  -volume management as per Internal Medicine Service    5  DM 2, with hyperglycemia and elevated hemoglobin A1c of 12 7  Risk factor for infection  Glycemic control as per Internal Medicine Service  6  Acute kidney injury, on CKD 3  Creatinine is now much improved     -dose adjust antibiotic based on renal function as indicated  -follow BMP    Antibiotics:  Vancomycin/cefepime 3    Above plan was discussed with pulmonology, IR, and Valentino Phelps from Internal Medicine Service    I personally reviewed prior hospitalization records  Thank you for this consultation  We will follow along with you  HISTORY OF PRESENT ILLNESS:  Reason for Consult:  Empyema    HPI: Anisha Bhatt is a 65 y o  female with prior MI and cardiac arrest resulting in tracheostomy and life vest, CHF, poorly controlled DM, thoracic aortic aneurysm, COPD/emphysema, MRSA pneumonia who initially presented to [facility] ER on 07/15 with chest pain and shortness of breath, as well as cough  Was initially managed for suspected acute CHF with IV diuresis  Follow up chest x-ray at [place] showed interim development of moderate-sized right-sided pneumothorax without tension for which patient was transferred to [facility] and underwent chest tube placement on 07/20, which was ultimately removed on 07/24 as pneumothorax resolved  On 07/26, patient reported worsening tracheal secretions and was more dyspneic  Procalcitonin also trended up to 0 96  Patient was started empirically on vancomycin and cefepime  No high fevers  WBC count has trended up today to 17  There was also new purulent drainage noted from prior chest tube site  CT chest performed today now shows new moderate partially loculated right pleural effusion  We were consulted for further antibiotic recommendations  REVIEW OF SYSTEMS:  A complete system-based review of systems is otherwise negative  PAST MEDICAL HISTORY:  Past Medical History:   Diagnosis Date    Anxiety     Aortic aneurysm ([facility] [address] )     Arthritis     Depression     Diabetes mellitus (HCC)     Fibromyalgia     GERD (gastroesophageal reflux disease)     GERD (gastroesophageal reflux disease)     H/O cardiovascular stress test 09/2018    no ischemia  EF 70%   H/O echocardiogram 01/2019    EF 60%  Mild LVH  trivial effusion      Hyperlipidemia     Hypertension     Migraines     Psychiatric disorder     anxiety    Uncontrolled hypertension 2/25/2015    Last Assessment & Plan:  BP today above goal <140/90, apparently asymptomatic  Prior BP elevations were attributed to not taking medication  Consider increased medication if persistent on f/u  Past Surgical History:   Procedure Laterality Date    BACK SURGERY      Lumbar epidural steroid injection    CARDIAC CATHETERIZATION N/A 10/22/2021    Procedure: Cardiac pci;  Surgeon: Humberto May MD;  [facility]; Service: Cardiology    CARDIAC CATHETERIZATION  10/22/2021    Procedure: Cardiac catheterization;  Surgeon: Humberto May MD;  [facility]; Service: Cardiology    CARDIAC CATHETERIZATION Left 10/27/2021    Procedure: Cardiac Left Heart Cath;  Surgeon: Jocelyn Potts MD;  [facility]; Service: Cardiology    CARDIAC CATHETERIZATION N/A 10/27/2021    Procedure: Cardiac pci;  Surgeon: Jocelyn Potts MD;  [facility]; Service: Cardiology    CARDIAC CATHETERIZATION N/A 10/28/2021    Procedure: Cardiac pci;  Surgeon: Lelia Mesa DO;  [facility]; Service: Cardiology    CARPAL TUNNEL RELEASE Left      SECTION      CHOLECYSTECTOMY      COLONOSCOPY      incomplete    COLONOSCOPY      EYE SURGERY      HYSTERECTOMY      Total    IR CHEST TUBE PLACEMENT  2022    IR CHEST TUBE PLACEMENT  2022    OVARIAN CYST REMOVAL      PEG W/TRACHEOSTOMY PLACEMENT N/A 2021    Procedure: TRACHEOSTOMY WITH INSERTION PEG TUBE;  Surgeon: Ely Fung DO;  Location: [facility] MAIN OR;  Service: General    TUBAL LIGATION      UPPER GASTROINTESTINAL ENDOSCOPY         FAMILY HISTORY:  Non-contributory    SOCIAL HISTORY:  Social History     Substance and Sexual Activity   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        Social History     Substance and Sexual Activity   Drug Use Yes    Types: Marijuana    Comment: medical; seldom use     Social History     Tobacco Use   Smoking Status Former Smoker    Packs/day: 1 00    Years: 30     Pack years: 30 00    Types: Cigarettes   Smokeless Tobacco Never Used       ALLERGIES:  Allergies   Allergen Reactions    Metformin Diarrhea    Clonidine Rash     Patch        MEDICATIONS:  All current active medications have been reviewed  PHYSICAL EXAM:  Vitals:  Temp:  [97 3 °F (36 3 °C)-99 6 °F (37 6 °C)] 97 4 °F (36 3 °C)  HR:  [69-91] 73  Resp:  [17-19] 18  BP: ()/(59-70) 98/59  SpO2:  [92 %-98 %] 95 %  Temp (24hrs), Av 3 °F (36 8 °C), Min:97 3 °F (36 3 °C), Max:99 6 °F (37 6 °C)  Current: Temperature: (!) 97 4 °F (36 3 °C)     Physical Exam:  General:  Debilitated, nontoxic  Eyes:  Conjunctive clear with no hemorrhages or effusions  Oropharynx:  No ulcers, no lesions  Neck:  Trach in place  Lungs:  Nonlabored respirations  Prior chest tube site with mild periwound erythema  No fluctuance or expressible purulence  Small amount of drainage noted on dressing  Abdomen:  Soft, non-tender, non-distended  Extremities:  Mild edema  Skin:  No rashes, no ulcers  Neurological:  Moves all four extremities spontaneously    LABS, IMAGING, & OTHER STUDIES:  Lab Results:  I have personally reviewed pertinent labs    Results from last 7 days   Lab Units 22  0504 22  0619 22  0507 22  0522   POTASSIUM mmol/L 4 4 3 8 4 0 3 9   CHLORIDE mmol/L 95* 94* 93* 98   CO2 mmol/L    BUN mg/dL 43* 35* 33* 31*   CREATININE mg/dL 1 08 1 06 1 18 0 88   EGFR ml/min/1 73sq m 57 58 51 73   CALCIUM mg/dL 8 4 8 7 9 1 8 7   AST U/L  --   --   --  10*   ALT U/L  --   --   --  7   ALK PHOS U/L  --   --   --  96     Results from last 7 days   Lab Units 22  0504 22  0507 22  0522   WBC Thousand/uL 17 34* 12 70* 15 82*   HEMOGLOBIN g/dL 8 4* 9 7* 8 7*   PLATELETS Thousands/uL 487* 554* 423*     Results from last 7 days   Lab Units 22  1349 22  1109   SPUTUM CULTURE  2+ Growth of Staphylococcus aureus*  --    GRAM STAIN RESULT  1+ Epithelial cells per low power field*  3+ Polys*  1+ Gram positive cocci in pairs*  Rare Gram positive rods*  --    MRSA CULTURE ONLY   --  No Methicillin Resistant Staphlyococcus aureus (MRSA) isolated       Imaging Studies:   I have personally reviewed pertinent imaging study reports and images in PACS  CT chest from today shows moderate partially loculated right pleural effusion  Small right anterior loculated component lies deep to the site of previous pigtail insertion with no fluid collection in the chest wall  No lung abscess  Moderate compressive atelectasis in right lower lobe due to effusion  Superimposed pneumonia cannot be excluded  Mild interstitial edema  Minimal new inflammatory ground-glass opacity in left upper lobe  Chest x-ray from 07/18/2022 showed    EKG, Pathology, and Other Studies:   I have personally reviewed pertinent reports

## 2022-07-28 NOTE — PROGRESS NOTES
Vancomycin Assessment    Kelli Tan is a 65 y o  female who is currently receiving vancomycin 1g IV q12 hours for Pneumonia     Relevant clinical data and objective history reviewed:  Creatinine   Date Value Ref Range Status   07/27/2022 1 06 0 60 - 1 30 mg/dL Final     Comment:     Standardized to IDMS reference method   07/26/2022 1 18 0 60 - 1 30 mg/dL Final     Comment:     Standardized to IDMS reference method   07/25/2022 0 88 0 60 - 1 30 mg/dL Final     Comment:     Standardized to IDMS reference method   07/19/2015 0 71 0 60 - 1 30 mg/dL Final     Comment:     Standardized to IDMS reference method   07/08/2015 0 59 (L) 0 60 - 1 30 mg/dL Final     Comment:     Standardized to IDMS reference method   03/28/2015 0 44 (L) 0 60 - 1 30 mg/dL Final     Comment:     Standardized to IDMS reference method     Vancomycin Rm   Date Value Ref Range Status   11/04/2021 14 7 ug/mL Final     /66   Pulse 75   Temp (!) 97 3 °F (36 3 °C)   Resp 18   Wt 76 9 kg (169 lb 9 6 oz)   SpO2 96%   BMI 25 05 kg/m²   I/O last 3 completed shifts: In: 120 [P O :120]  Out: 850 [Urine:850]  Lab Results   Component Value Date/Time    BUN 35 (H) 07/27/2022 06:19 AM    BUN 16 07/19/2015 03:40 AM    WBC 12 70 (H) 07/26/2022 05:07 AM    WBC 10 66 (H) 07/19/2015 03:40 AM    HGB 9 7 (L) 07/26/2022 05:07 AM    HGB 13 3 07/19/2015 03:40 AM    HCT 32 1 (L) 07/26/2022 05:07 AM    HCT 40 2 07/19/2015 03:40 AM    MCV 76 (L) 07/26/2022 05:07 AM    MCV 84 07/19/2015 03:40 AM     (H) 07/26/2022 05:07 AM     07/19/2015 03:40 AM     Temp Readings from Last 3 Encounters:   07/27/22 (!) 97 3 °F (36 3 °C)   07/19/22 98 °F (36 7 °C) (Tympanic)   06/29/22 (!) 97 °F (36 1 °C) (Tympanic)     Vancomycin Days of Therapy: 2    Assessment/Plan  The patient is currently on vancomycin utilizing scheduled dosing   The patient is receiving 1g IV q12 hours with the most recent vancomycin level being at steady-state and supratherapeutic based on a goal of 15-20 (appropriate for most indications) ; therefore, after clinical evaluation will be changed to 750mg IV q12 hours   Pharmacy will continue to follow closely for s/sx of nephrotoxicity, infusion reactions, and appropriateness of therapy  BMP and CBC will be ordered per protocol  Plan for trough as patient approaches steady state, prior to the 4th  dose at approximately 0930 on 7/29/22  Pharmacy will continue to follow the patients culture results and clinical progress daily      Joann Quezada, Pharmacist

## 2022-07-28 NOTE — PLAN OF CARE
Problem: Potential for Falls  Goal: Patient will remain free of falls  Description: INTERVENTIONS:  - Educate patient/family on patient safety including physical limitations  - Instruct patient to call for assistance with activity   - Consult OT/PT to assist with strengthening/mobility   - Keep Call bell within reach  - Keep bed low and locked with side rails adjusted as appropriate  - Keep care items and personal belongings within reach  - Initiate and maintain comfort rounds  - Make Fall Risk Sign visible to staff  - Apply yellow socks and bracelet for high fall risk patients  - Consider moving patient to room near nurses station  Outcome: Progressing     Problem: Nutrition/Hydration-ADULT  Goal: Nutrient/Hydration intake appropriate for improving, restoring or maintaining nutritional needs  Description: Monitor and assess patient's nutrition/hydration status for malnutrition  Collaborate with interdisciplinary team and initiate plan and interventions as ordered  Monitor patient's weight and dietary intake as ordered or per policy  Utilize nutrition screening tool and intervene as necessary  Determine patient's food preferences and provide high-protein, high-caloric foods as appropriate       INTERVENTIONS:  - Monitor oral intake, urinary output, labs, and treatment plans  - Assess nutrition and hydration status and recommend course of action  - Evaluate amount of meals eaten  - Assist patient with eating if necessary   - Allow adequate time for meals  - Recommend/ encourage appropriate diets, oral nutritional supplements, and vitamin/mineral supplements  - Order, calculate, and assess calorie counts as needed  - Recommend, monitor, and adjust tube feedings and TPN/PPN based on assessed needs  - Assess need for intravenous fluids  - Provide specific nutrition/hydration education as appropriate  - Include patient/family/caregiver in decisions related to nutrition  Outcome: Progressing     Problem: PAIN - ADULT  Goal: Verbalizes/displays adequate comfort level or baseline comfort level  Description: Interventions:  - Encourage patient to monitor pain and request assistance  - Assess pain using appropriate pain scale  - Administer analgesics based on type and severity of pain and evaluate response  - Implement non-pharmacological measures as appropriate and evaluate response  - Consider cultural and social influences on pain and pain management  - Notify physician/advanced practitioner if interventions unsuccessful or patient reports new pain  Outcome: Progressing     Problem: INFECTION - ADULT  Goal: Absence or prevention of progression during hospitalization  Description: INTERVENTIONS:  - Assess and monitor for signs and symptoms of infection  - Monitor lab/diagnostic results  - Monitor all insertion sites, i e  indwelling lines, tubes, and drains  - Monitor endotracheal if appropriate and nasal secretions for changes in amount and color  - [place] appropriate cooling/warming therapies per order  - Administer medications as ordered  - Instruct and encourage patient and family to use good hand hygiene technique  - Identify and instruct in appropriate isolation precautions for identified infection/condition  Outcome: Progressing  Goal: Absence of fever/infection during neutropenic period  Description: INTERVENTIONS:  - Monitor WBC    Outcome: Progressing     Problem: SAFETY ADULT  Goal: Patient will remain free of falls  Description: INTERVENTIONS:  - Educate patient/family on patient safety including physical limitations  - Instruct patient to call for assistance with activity   - Consult OT/PT to assist with strengthening/mobility   - Keep Call bell within reach  - Keep bed low and locked with side rails adjusted as appropriate  - Keep care items and personal belongings within reach  - Initiate and maintain comfort rounds  - Make Fall Risk Sign visible to staff  - Apply yellow socks and bracelet for high fall risk patients  - Consider moving patient to room near nurses station  Outcome: Progressing  Goal: Maintain or return to baseline ADL function  Description: INTERVENTIONS:  -  Assess patient's ability to carry out ADLs; assess patient's baseline for ADL function and identify physical deficits which impact ability to perform ADLs (bathing, care of mouth/teeth, toileting, grooming, dressing, etc )  - Assess/evaluate cause of self-care deficits   - Assess range of motion  - Assess patient's mobility; develop plan if impaired  - Assess patient's need for assistive devices and provide as appropriate  - Encourage maximum independence but intervene and supervise when necessary  - Involve family in performance of ADLs  - Assess for home care needs following discharge   - Consider OT consult to assist with ADL evaluation and planning for discharge  - Provide patient education as appropriate  Outcome: Progressing  Goal: Maintains/Returns to pre admission functional level  Description: INTERVENTIONS:  - Perform BMAT or MOVE assessment daily    - Set and communicate daily mobility goal to care team and patient/family/caregiver     - Collaborate with rehabilitation services on mobility goals if consulted  - Out of bed for toileting  - Record patient progress and toleration of activity level   Outcome: Progressing     Problem: DISCHARGE PLANNING  Goal: Discharge to home or other facility with appropriate resources  Description: INTERVENTIONS:  - Identify barriers to discharge w/patient and caregiver  - Arrange for needed discharge resources and transportation as appropriate  - Identify discharge learning needs (meds, wound care, etc )  - Arrange for interpretive services to assist at discharge as needed  - Refer to Case Management Department for coordinating discharge planning if the patient needs post-hospital services based on physician/advanced practitioner order or complex needs related to functional status, cognitive ability, or social support system  Outcome: Progressing     Problem: Knowledge Deficit  Goal: Patient/family/caregiver demonstrates understanding of disease process, treatment plan, medications, and discharge instructions  Description: Complete learning assessment and assess knowledge base  Interventions:  - Provide teaching at level of understanding  - Provide teaching via preferred learning methods  Outcome: Progressing     Problem: MOBILITY - ADULT  Goal: Maintain or return to baseline ADL function  Description: INTERVENTIONS:  -  Assess patient's ability to carry out ADLs; assess patient's baseline for ADL function and identify physical deficits which impact ability to perform ADLs (bathing, care of mouth/teeth, toileting, grooming, dressing, etc )  - Assess/evaluate cause of self-care deficits   - Assess range of motion  - Assess patient's mobility; develop plan if impaired  - Assess patient's need for assistive devices and provide as appropriate  - Encourage maximum independence but intervene and supervise when necessary  - Involve family in performance of ADLs  - Assess for home care needs following discharge   - Consider OT consult to assist with ADL evaluation and planning for discharge  - Provide patient education as appropriate  Outcome: Progressing  Goal: Maintains/Returns to pre admission functional level  Description: INTERVENTIONS:  - Perform BMAT or MOVE assessment daily    - Set and communicate daily mobility goal to care team and patient/family/caregiver     - Collaborate with rehabilitation services on mobility goals if consulted  - Out of bed for toileting  - Record patient progress and toleration of activity level   Outcome: Progressing     Problem: Prexisting or High Potential for Compromised Skin Integrity  Goal: Skin integrity is maintained or improved  Description: INTERVENTIONS:  - Identify patients at risk for skin breakdown  - Assess and monitor skin integrity  - Assess and monitor nutrition and hydration status  - Monitor labs   - Assess for incontinence   - Turn and reposition patient  - Assist with mobility/ambulation  - Relieve pressure over bony prominences  - Avoid friction and shearing  - Provide appropriate hygiene as needed including keeping skin clean and dry  - Evaluate need for skin moisturizer/barrier cream  - Collaborate with interdisciplinary team   - Patient/family teaching  - Consider wound care consult   Outcome: Progressing

## 2022-07-28 NOTE — ASSESSMENT & PLAN NOTE
· Impacted by hyperglycemia as well and pain but suspect she is not on the fluid restriction that was recommended  Recommend adherence to this and follow-up BMP in a m

## 2022-07-28 NOTE — PROGRESS NOTES
Progress Note - Pulmonary   Kelli Red 65 y o  female MRN: [id number]  Unit/Bed#: S -01 Encounter: [id number]      Assessment:  1  Acute pulmonary insufficiency on chronic hypoxic respiratory failure   2  Primary spontaneous pneumothorax -resolved  3  Loculated right pleural effusion  4  Acute on chronic diastolic CHF w/ monitor PHTN likely WHO group II & III and PAF     5  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/ colonic stenosis      6   Suspected COPD of unknown severity without acute exacerbation     7  Mild CHANTALE    Plan:  · Still having chest pain anteriorly and radiating to the back, at the site of the previous chest tube  Pain management on board  · Multidisciplinary discussion this morning, CT scan which showing evidence of a moderate loculated right pleural effusion  · IR planning for chest tube placement and drainage of this space, will need to send for fluid studies to rule out evolving parapneumonic effusion/empyema  · Continue with broad-spectrum antibiotics for now, appreciate Infectious Disease recommendations  · Continue pulmonary hygiene, mobilization     Subjective:   "My chest is still hurting"     Vitals: Blood pressure 98/59, pulse 73, temperature (!) 97 4 °F (36 3 °C), resp  rate 18, weight 77 9 kg (171 lb 12 8 oz), SpO2 95 %  , Body mass index is 25 37 kg/m²  Intake/Output Summary (Last 24 hours) at 7/28/2022 1301  Last data filed at 7/28/2022 0904  Gross per 24 hour   Intake 360 ml   Output 150 ml   Net 210 ml       Physical Exam  Gen: Currently on trach collar  HEENT: Mucous membranes moist, no oral lesions, no thrush  NECK: No accessory muscle use, JVP not elevated  Cardiac: Regular, single S1, single S2, no murmurs, no rubs, no gallops  Lungs: Decreased breath sounds at the bases  Abdomen: normoactive bowel sounds, soft nontender, nondistended, no rebound or rigidity, no guarding  Extremities: no cyanosis, no clubbing, no edema    Labs:  I have personally reviewed pertinent lab results          La Nena Martinez MD

## 2022-07-28 NOTE — ASSESSMENT & PLAN NOTE
Lab Results   Component Value Date    EGFR 57 07/28/2022    EGFR 58 07/27/2022    EGFR 51 07/26/2022    CREATININE 1 08 07/28/2022    CREATININE 1 06 07/27/2022    CREATININE 1 18 07/26/2022   · Creatinine elevated at 1 64 upon transfer on 7/19/22, with baseline being 0 9-1 0  · Trended down to 0 88, but now stable at 1 08  · Monitor carefully  · Resumed spironolactone as well as losartan on 7/23

## 2022-07-28 NOTE — ASSESSMENT & PLAN NOTE
Wt Readings from Last 3 Encounters:   07/28/22 77 9 kg (171 lb 12 8 oz)   07/19/22 78 2 kg (172 lb 6 4 oz)   06/29/22 81 7 kg (180 lb 3 2 oz)   · EF noted to be 50% on last echocardiogram 2/22  · Admitted to [facility] for CHF exac on 7/15 - given 80 mg IV lasix and then placed back on bumex 2 mg PO BID  · Developed MODESTO and thus bumex dose reduced - got 2 mg on 7/19, no bumex on on 7/20, 1 mg on 7/21, 2 mg on 7/23, 3 mg on 7/23 (home dose had been bumex 2 mg PO BID)  · 7/24 AM with increasing SOB, oxygen requiements - given 1 mg IV bumex with 4-5 episodes of urine output (amt not recorded)   · CXR showed mild vascular congestion  · Suspected iatrogenic volume overload in the setting of reduced/withheld diuretic dosing  · Consulted cardiology, appreciate their input  Patient received several doses IV Bumex    Currently back to PO Bumex

## 2022-07-28 NOTE — ASSESSMENT & PLAN NOTE
· STEMI 10/22/21 - PATRICA to mid circumflex  · VT arrest 10/26   · Polymorphic VT x 1 shock 10/28/21  · ICD not placed given risks felt to outweigh benefits with cognitive function and risk of infection at that that time  · Cardiac arrest 1/1/22 - likely VT related - went to [facility]  · No ICD given lung infection and on IV ABX x 4 weeks  · Canceled 2 EP appts since that time and thus no ICD in place - still wears LifeVest  · EF seems to have always been 40% or higher

## 2022-07-28 NOTE — ASSESSMENT & PLAN NOTE
· Patient still feels pain along the track of the prior chest tube despite being removed 4 days ago  Reviewed this with pain management team -appreciate their assistance  Continue topical agents and muscle relaxants including Valium  I increased her Lyrica as well on 7/27  · Patient showed me an image on her phone (and nurse confirms) of pus coming out of the prior chest tube site  I reached out to ID for a consult, pulm and IR  · STAT CT chest "Moderate partially loculated right pleural effusion  A small right anterior loculated component lies deep to the site of previous pigtail insertion with no fluid collection in the chest wall  No lung abscess  Moderate compressive atelectasis in the right lower lobe due to the effusion  Superimposed pneumonia cannot be excluded in the appropriate clinical setting  Mild interstitial edema  Minimal new inflammatory groundglass opacity in the left upper lobe    · Already on broad spectrum antibiotics, with rise in white count

## 2022-07-28 NOTE — PLAN OF CARE
Problem: INFECTION - ADULT  Goal: Absence or prevention of progression during hospitalization  Description: INTERVENTIONS:  - Assess and monitor for signs and symptoms of infection  - Monitor lab/diagnostic results  - Monitor all insertion sites, i e  indwelling lines, tubes, and drains  - Monitor endotracheal if appropriate and nasal secretions for changes in amount and color  - [place] appropriate cooling/warming therapies per order  - Administer medications as ordered  - Instruct and encourage patient and family to use good hand hygiene technique  - Identify and instruct in appropriate isolation precautions for identified infection/condition  Outcome: Progressing  Goal: Absence of fever/infection during neutropenic period  Description: INTERVENTIONS:  - Monitor WBC    Outcome: Progressing     Problem: SAFETY ADULT  Goal: Patient will remain free of falls  Description: INTERVENTIONS:  - Educate patient/family on patient safety including physical limitations  - Instruct patient to call for assistance with activity   - Consult OT/PT to assist with strengthening/mobility   - Keep Call bell within reach  - Keep bed low and locked with side rails adjusted as appropriate  - Keep care items and personal belongings within reach  - Initiate and maintain comfort rounds  - Make Fall Risk Sign visible to staff  - Apply yellow socks and bracelet for high fall risk patients  - Consider moving patient to room near nurses station  Outcome: Progressing  Goal: Maintain or return to baseline ADL function  Description: INTERVENTIONS:  - Educate patient/family on patient safety including physical limitations  - Instruct patient to call for assistance with activity   - Consult OT/PT to assist with strengthening/mobility   - Keep Call bell within reach  - Keep bed low and locked with side rails adjusted as appropriate  - Keep care items and personal belongings within reach  - Initiate and maintain comfort rounds  - Make Fall Risk Sign visible to staff  - Apply yellow socks and bracelet for high fall risk patients  - Consider moving patient to room near nurses station  Outcome: Progressing  Goal: Maintains/Returns to pre admission functional level  Description: INTERVENTIONS:  -  Assess patient's ability to carry out ADLs; assess patient's baseline for ADL function and identify physical deficits which impact ability to perform ADLs (bathing, care of mouth/teeth, toileting, grooming, dressing, etc )  - Assess/evaluate cause of self-care deficits   - Assess range of motion  - Assess patient's mobility; develop plan if impaired  - Assess patient's need for assistive devices and provide as appropriate  - Encourage maximum independence but intervene and supervise when necessary  - Involve family in performance of ADLs  - Assess for home care needs following discharge   - Consider OT consult to assist with ADL evaluation and planning for discharge  - Provide patient education as appropriate  Outcome: Progressing

## 2022-07-28 NOTE — ASSESSMENT & PLAN NOTE
· STEMI 10/22/2021 s/p PATRICA mid circumflex OM1  OM2 was ballooned but not stented  · Pulseless VT 10/27/21 - repeat LHC 90% mid circumflex stenosis, but could not be intervened on  · VT 10/28/21 LHC:  found to have apical LAD complete occlusion was felt to be small to be intervened    · Cardiac carrest 1/1/22 - [facility] felt to be hypoxic vs  VT induced  · On beta-blocker, Brilinta and Lipitor

## 2022-07-28 NOTE — ASSESSMENT & PLAN NOTE
· Trach placed in the context of cardiac arrest/prolonged ventilator dependent state November 2021  · Decannulated at [facility] 12/11/21  · Patient requires frequent tracheostomy changes and suctioning  · Per discussion with speech therapy,  video barium swallow was done for sense of food getting stuck   Appropriate for regular diet  · On trach collar O2 with humidification

## 2022-07-28 NOTE — ASSESSMENT & PLAN NOTE
Lab Results   Component Value Date    HGBA1C 12 7 (H) 05/22/2022     Recent Labs     07/27/22  1131 07/27/22  1557 07/27/22  2134 07/28/22  0716   POCGLU 292* 221* 278* 198*     Blood Sugar Average: Last 72 hrs:  · (P) 319 [id number]   · Very poorly controlled based on A1c  · Continue regimen of basal bolus insulin; continue to make small titration adjustments as needed  · ADA diet

## 2022-07-28 NOTE — PROGRESS NOTES
Vancomycin IV Pharmacy-to-Dose Consultation    Selam Strong is a 65 y o  female who is currently receiving Vancomycin IV with management by the Pharmacy Consult service for the treatment of Pneumonia  Assessment/Plan:  The patient was reviewed  Renal function is stable and no signs or symptoms of nephrotoxicity and/or infusion reactions were documented in the chart  Based on todays assessment, continue current vancomycin (day # 3) dosing of 750mg IV q12h, with a plan for trough to be drawn at 0930 on 7/29  We will continue to follow the patients culture results and infectious disease recommendations as well as clinical progress daily      Elizabeth Murray, Pharmacist

## 2022-07-28 NOTE — ASSESSMENT & PLAN NOTE
· Patient noted to be significantly more dyspneic and tachypneic on the morning of July 26 with minimal exertion of going to the bedside commode  · She had already been aggressively diuresed with IV Bumex  · She also reported increased phlegm/tracheal secretions and procalcitonin was newly noted to be elevated at 0 96--now on day 3 broad-spectrum empiric antibiotics including IV cefepime and vancomycin based on previous culture showing MRSA in sputum, recent chest tube and frequent hospitalizations  · Continue aggressive respiratory protocol with suctioning  · Spoke with pulmonology and Cardiology  · Updated x-ray with atelectasis

## 2022-07-28 NOTE — PROGRESS NOTES
[facility]  Progress Note - Parisa Guerra 1965, 65 y o  female MRN: [id number]  Unit/Bed#: S -01 Encounter: [id number]  Primary Care Provider: Vinod Vogt MD   Date and time admitted to hospital: 7/19/2022 11:33 AM    * Persistent pain despite chest tube removal  Assessment & Plan  · Patient still feels pain along the track of the prior chest tube despite being removed 4 days ago  Reviewed this with pain management team -appreciate their assistance  Continue topical agents and muscle relaxants including Valium  I increased her Lyrica as well on 7/27  · Patient showed me an image on her phone (and nurse confirms) of pus coming out of the prior chest tube site  I reached out to ID for a consult, pulm and IR  · STAT CT chest "Moderate partially loculated right pleural effusion  A small right anterior loculated component lies deep to the site of previous pigtail insertion with no fluid collection in the chest wall  No lung abscess  Moderate compressive atelectasis in the right lower lobe due to the effusion  Superimposed pneumonia cannot be excluded in the appropriate clinical setting  Mild interstitial edema  Minimal new inflammatory groundglass opacity in the left upper lobe  · Already on broad spectrum antibiotics, with rise in white count    Primary spontaneous pneumothorax-resolved as of 7/25/2022  Assessment & Plan  · Patient noted on imaging to have small pneumothorax at [facility], subsequently [facility]  · Chest tube placed 7/20, then upsized on 7/22 given no significant change and appearance that it was kinked  · Chest x-ray performed 7/24 -no pneumothorax at that time    Chest tube was subsequently removed      Respiratory Distress  Assessment & Plan  · Patient noted to be significantly more dyspneic and tachypneic on the morning of July 26 with minimal exertion of going to the bedside commode  · She had already been aggressively diuresed with IV Bumex  · She also reported increased phlegm/tracheal secretions and procalcitonin was newly noted to be elevated at 0 96--now on day 3 broad-spectrum empiric antibiotics including IV cefepime and vancomycin based on previous culture showing MRSA in sputum, recent chest tube and frequent hospitalizations  · Continue aggressive respiratory protocol with suctioning  · Spoke with pulmonology and Cardiology  · Updated x-ray with atelectasis    Hyponatremia  Assessment & Plan  · Impacted by hyperglycemia as well and pain but suspect she is not on the fluid restriction that was recommended  Recommend adherence to this and follow-up BMP in a m  Tracheostomy in place [facility])  Assessment & Plan  · Trach placed in the context of cardiac arrest/prolonged ventilator dependent state November 2021  · Decannulated at [facility] 12/11/21  · Patient requires frequent tracheostomy changes and suctioning  · Per discussion with speech therapy,  video barium swallow was done for sense of food getting stuck   Appropriate for regular diet  · On trach collar O2 with humidification      Type 2 diabetes mellitus with hyperglycemia [facility])  Assessment & Plan  Lab Results   Component Value Date    HGBA1C 12 7 (H) 05/22/2022     Recent Labs     07/27/22  1131 07/27/22  1557 07/27/22  2134 07/28/22  0716   POCGLU 292* 221* 278* 198*     Blood Sugar Average: Last 72 hrs:  · (P) 674 [id number]   · Very poorly controlled based on A1c  · Continue regimen of basal bolus insulin; continue to make small titration adjustments as needed  · ADA diet    Acute on chronic HFrEF (heart failure with reduced ejection fraction) (HCC)-resolved as of 7/27/2022  Assessment & Plan  Wt Readings from Last 3 Encounters:   07/28/22 77 9 kg (171 lb 12 8 oz)   07/19/22 78 2 kg (172 lb 6 4 oz)   06/29/22 81 7 kg (180 lb 3 2 oz)   · EF noted to be 50% on last echocardiogram 2/22  · [place] [facility] [place]15 - given 80 mg IV lasix and then placed back on bumex 2 mg PO BID  · Developed MODESTO and thus bumex dose reduced - got 2 mg on 7/19, no bumex on on 7/20, 1 mg on 7/21, 2 mg on 7/23, 3 mg on 7/23 (home dose had been bumex 2 mg PO BID)  · 7/24 AM with increasing SOB, oxygen requiements - given 1 mg IV bumex with 4-5 episodes of urine output (amt not recorded)   · CXR showed mild vascular congestion  · Suspected iatrogenic volume overload in the setting of reduced/withheld diuretic dosing  · Consulted cardiology, appreciate their input  Patient received several doses IV Bumex  Currently back to PO Bumex    Acute kidney injury superimposed on chronic kidney disease stage 3 (HCC)-resolved as of 7/25/2022  Assessment & Plan  Lab Results   Component Value Date    EGFR 57 07/28/2022    EGFR 58 07/27/2022    EGFR 51 07/26/2022    CREATININE 1 08 07/28/2022    CREATININE 1 06 07/27/2022    CREATININE 1 18 07/26/2022   · Creatinine elevated at 1 64 upon transfer on 7/19/22, with baseline being 0 9-1 0  · Trended down to 0 88, but now stable at 1 08  · Monitor carefully  · Resumed spironolactone as well as losartan on 7/23    A-fib [facility])  Assessment & Plan  · On anticoagulation with Eliquis   · On amiodarone and Toprol which will be continued    CAD (coronary artery disease)  Assessment & Plan  · STEMI 10/22/2021 s/p PATRICA mid circumflex OM1  OM2 was ballooned but not stented  · Pulseless VT 10/27/21 - repeat LHC 90% mid circumflex stenosis, but could not be intervened on  · VT 10/28/21 LHC:  found to have apical LAD complete occlusion was felt to be small to be intervened    · Cardiac carrest 1/1/22 - [facility] felt to be hypoxic vs  VT induced  · On beta-blocker, Brilinta and Lipitor      History of Cardiac arrest [facility])  Assessment & Plan  · STEMI 10/22/21 - PATRICA to mid circumflex  · VT arrest 10/26   · Polymorphic VT x 1 shock 10/28/21  · ICD not placed given risks felt to outweigh benefits with cognitive function and risk of infection at that that time  · Cardiac arrest 22 - likely VT related - went to [facility]  · No ICD given lung infection and on IV ABX x 4 weeks  · Canceled 2 EP appts since that time and thus no ICD in place - still wears LifeVest  · EF seems to have always been 40% or higher      VTE Pharmacologic Prophylaxis: VTE Score: 3     Patient Centered Rounds: I performed bedside rounds with nursing staff today  Discussions with Specialists or Other Care Team Provider: ID, pulm, IR, pharmacy    Education and Discussions with Family / Patient: Patient declined call to   Time Spent for Care: 30 minutes  More than 50% of total time spent on counseling and coordination of care as described above  Current Length of Stay: 9 day(s)  Current Patient Status: Inpatient   Certification Statement: The patient will continue to require additional inpatient hospital stay due to pus from chest tube site  Discharge Plan: not stable for dc    Code Status: Level 1 - Full Code    Subjective:   Patient still having pain in her right chest wall radiating through to the back, along the tract of her prior chest tube  She also shows me an image when notes that there was pus coming out of it yesterday  Objective:     Vitals:   Temp (24hrs), Av 7 °F (37 1 °C), Min:97 3 °F (36 3 °C), Max:99 6 °F (37 6 °C)    Temp:  [97 3 °F (36 3 °C)-99 6 °F (37 6 °C)] 98 4 °F (36 9 °C)  HR:  [69-91] 71  Resp:  [14-19] 19  BP: (106-123)/(63-70) 123/70  SpO2:  [92 %-98 %] 95 %  Body mass index is 25 37 kg/m²  Input and Output Summary (last 24 hours): Intake/Output Summary (Last 24 hours) at 2022 1013  Last data filed at 2022 2017  Gross per 24 hour   Intake 120 ml   Output 150 ml   Net -30 ml       Physical Exam:   Physical Exam  Vitals reviewed  Constitutional:       General: She is not in acute distress  Appearance: She is not toxic-appearing or diaphoretic  Eyes:      General: No scleral icterus  Right eye: No discharge  Left eye: No discharge  Conjunctiva/sclera: Conjunctivae normal    Neck:      Comments: Yellow secretions from trach  Cardiovascular:      Rate and Rhythm: Normal rate and regular rhythm  Heart sounds: No murmur heard  Pulmonary:      Effort: No respiratory distress  Breath sounds: No stridor  Rhonchi present  No wheezing or rales  Comments: Basilar rhonchi  Chest:      Chest wall: Tenderness present  Abdominal:      General: There is no distension  Palpations: Abdomen is soft  Tenderness: There is no abdominal tenderness  There is no guarding  Musculoskeletal:      Right lower leg: No edema  Left lower leg: No edema  Skin:     General: Skin is warm and dry  Coloration: Skin is not jaundiced or pale  Findings: No bruising, erythema, lesion or rash  Neurological:      General: No focal deficit present  Mental Status: She is alert  Mental status is at baseline  Additional Data:     Labs:  Results from last 7 days   Lab Units 07/28/22  0504   WBC Thousand/uL 17 34*   HEMOGLOBIN g/dL 8 4*   HEMATOCRIT % 26 7*   PLATELETS Thousands/uL 487*   NEUTROS PCT % 83*   LYMPHS PCT % 6*   MONOS PCT % 9   EOS PCT % 1     Results from last 7 days   Lab Units 07/28/22  0504 07/26/22  0507 07/25/22  0522   SODIUM mmol/L 129*   < > 135   POTASSIUM mmol/L 4 4   < > 3 9   CHLORIDE mmol/L 95*   < > 98   CO2 mmol/L 26   < > 27   BUN mg/dL 43*   < > 31*   CREATININE mg/dL 1 08   < > 0 88   ANION GAP mmol/L 8   < > 10   CALCIUM mg/dL 8 4   < > 8 7   ALBUMIN g/dL  --   --  2 8*   TOTAL BILIRUBIN mg/dL  --   --  0 69   ALK PHOS U/L  --   --  96   ALT U/L  --   --  7   AST U/L  --   --  10*   GLUCOSE RANDOM mg/dL 205*   < > 120    < > = values in this interval not displayed           Results from last 7 days   Lab Units 07/28/22  0716 07/27/22  2134 07/27/22  1557 07/27/22  1131 07/27/22  0704 07/26/22  2109 07/26/22  1549 07/26/22  1143 07/25/22  2118 07/25/22  1746 07/25/22  1608 07/25/22  1115   POC GLUCOSE mg/dl 198* 278* 221* 292* 185* 214* 181* 223* 266* 172* 79 135         Results from last 7 days   Lab Units 07/27/22  0619 07/26/22  0507   PROCALCITONIN ng/ml 0 68* 0 96*       Lines/Drains:  Invasive Devices  Report    Peripheral Intravenous Line  Duration           Peripheral IV 07/28/22 Right Antecubital <1 day          Airway  Duration           Surgical Airway Shiley Fenestrated 148 days                  Telemetry:  Telemetry Orders (From admission, onward)             LifeVest Patient: Continuous Telemetry Monitoring during hospitalization (non-expiring)  Continuous LifeVest Telemetry Monitoring        References:    LifeVest Policy                 Telemetry Reviewed:  No events  Indication for Continued Telemetry Use: Lifevest (remains on tele entire hospital stay)             Imaging:     Recent Cultures (last 7 days):   Results from last 7 days   Lab Units 07/26/22  1349   SPUTUM CULTURE  2+ Growth of Staphylococcus aureus*   GRAM STAIN RESULT  1+ Epithelial cells per low power field*  3+ Polys*  1+ Gram positive cocci in pairs*  Rare Gram positive rods*       Last 24 Hours Medication List:   Current Facility-Administered Medications   Medication Dose Route Frequency Provider Last Rate    acetaminophen  975 mg Oral Q6H [address] Leda Chand PA-C      albuterol  2 5 mg Nebulization Q4H PRN Leda Chand PA-C      amiodarone  100 mg Oral Daily With Breakfast Leda Chand PA-C      apixaban  5 mg Oral BID Leda Chand PA-C      atorvastatin  40 mg Oral Daily With [employer] InstrumentsDONALD      benzonatate  100 mg Oral TID PRN Edel Bearden MD      cefepime  1,000 mg Intravenous Q12H Leda Chand PA-C 1,000 mg (07/28/22 0809)    diazepam  5 mg Oral Q6H PRN Etta Nelson PA-C      Diclofenac Sodium  2 g Topical 4x Daily Bright Zena Aldrich MD      escitalopram  10 mg Oral Daily Leda Chand PA-C      fentanyl citrate (PF) Intravenous Code/Trauma/Sedation Med Vin Foster MD      ferrous sulfate  325 mg Oral Daily With Breakfast Leda Chand, DONALD      guaiFENesin  1,200 mg Oral Q12H [address] Leda Chand, DONALD      HYDROmorphone  0 5 mg Intravenous Q2H PRN Bright Namita Sherwood MD      HYDROmorphone  2 mg Oral Q4H PRN Milan Adams, DONALD      HYDROmorphone  4 mg Oral Q4H PRN Etta Nelson, DONALD      insulin glargine  38 Units Subcutaneous HS Leda Chand, PA-C      insulin lispro  19 Units Subcutaneous TID With Meals Leda Chand, PA-C      insulin lispro  2-12 Units Subcutaneous TID AC Leda Chand, PA-C      insulin lispro  2-12 Units Subcutaneous HS Leda Chand, PA-C      ipratropium  0 5 mg Nebulization TID Edelmira Ballesteros, PA-GAIL      levalbuterol  1 25 mg Nebulization TID Edelmira Ballesteros, DONALD      lidocaine  2 patch Topical Daily Leda Chand, PA-C      losartan  50 mg Oral Daily Etta Nelson, PA-C      metoprolol succinate  50 mg Oral Q12H Leda Chand, PA-C      pantoprazole  40 mg Oral Early Morning Leda Chand, PA-C      potassium chloride  20 mEq Oral Daily Leda Chand, PA-C      pregabalin  25 mg Oral QPM Leda Chand, PA-C      pregabalin  75 mg Oral Daily Leda Chand, PA-C      spironolactone  100 mg Oral Daily Etta Nelson, PA-GAIL      ticagrelor  90 mg Oral Q12H Leda Chand, PA-C      trimethobenzamide  200 mg Intramuscular Q6H PRN Orval Or, PA-C      vancomycin  10 mg/kg Intravenous Q12H Leda Chand, PA-C 750 mg (07/27/22 2208)        Today, Patient Was Seen By: Edelmira Ballesteros PA-C    **Please Note: This note may have been constructed using a voice recognition system  **

## 2022-07-28 NOTE — PROGRESS NOTES
Progress Note - Acute Pain Service    Steff Canela 65 y o  female MRN: [id number]  Unit/Bed#: S -01 Encounter: [id number]      Assessment:   Principal Problem:    Persistent pain despite chest tube removal  Active Problems:    Type 2 diabetes mellitus with hyperglycemia (HCC)    A-fib (HCC)    History of Cardiac arrest (HCC)    CAD (coronary artery disease)    Tracheostomy in place ([facility] [address] )    Leukocytosis    Respiratory Distress    Kelli Daly is a 65 y o  female with PMHx of VT arrest leading to prolonged intubation/ICU stay (11/2021)  Subsequent subglottic stenosis requiring trach, DM type 2, CKD stage 3, who had a right PTX s/p chest tube placement on 7/20  She had a right ES plane block with exparel on 7/23 with limited analgesic benefit (6-8 hours)  The chest tube was subsequently removed on 7/24  She is currently day 4 from chest tube removal but still complains of persistent chest tube site pain    Pt is seen in her room, she just returned from CT scan  She is awake, alert, oriented, communicative through trach collar  She is eating her breakfast and on her phone  voltaren gel/lidoderm patches were suggested yesterday to be trialed for the pain surrounding the chest tube removal site  As per patient, there is a bandage over the chest tube insertion/removal site  Pt did express the creams were not applied directly to the chest tube site  Will discuss with trauma to determine if specific instructions can be ordered for placement (cream, lidoderm patch around the periphery of chest tube site)  Pt is also on valium PO QID, and pt is aware that she may ask for valium as needed  She feels this med has been most efficacious in helping her pain control (describes it as muscle aches/spasms)  She is also aware she has PO and IV dilaudid PRN to utilize throughout the day  Plan:   - modify tylenol to 975 mg PO TID  LFTs from 7/25 are not elevated   May continue this scheduled regimen for a few days time and recheck LFTs  - continue voltaren gel 1% topical QID  Consider instructions to apply around dressing site of chest tube removal    - continue lidoderm ptaches order  Consider instructions to apply around dressing site of chest tube removal    - continue lyrical 75 mg PO QD (home med)  - continue valium 5 mg PO QID PRN muscle spasms  - continue dilaudid 0 5 mg IV Q 2 hrs PRN breakthrough pain  - continue dilaudid 2-4 mg PO Q 4 hrs PRN mod-severe pain  - will discuss with SL[facility] service regarding plans to discharge, anticipated length of stay  Bowel Regimen:  - Consider orders for colace, senokot, miralax    APS will continue to follow  Please contact Acute Pain Service - [facility] via [employer] from [phone] with additional questions or concerns  See Reji or Shira for additional contacts and after hours information      Pain History  Current pain location(s): right upper chest area, where chest tube was removed  Pain Scale:   5/10  24 hour history: assessment of patient, advised to apply voltaren gel/lidoderm patch around affected pain area    Opioid requirement previous 24 hours: dilaudid 2 mg PO x 2 doses, dilaudid 0 5 mg IV x 1 dose    Meds/Allergies   all current active meds have been reviewed, current meds:   Current Facility-Administered Medications   Medication Dose Route Frequency    acetaminophen (TYLENOL) tablet 975 mg  975 mg Oral Q6H [address]    albuterol inhalation solution 2 5 mg  2 5 mg Nebulization Q4H PRN    amiodarone tablet 100 mg  100 mg Oral Daily With Breakfast    apixaban (ELIQUIS) tablet 5 mg  5 mg Oral BID    atorvastatin (LIPITOR) tablet 40 mg  40 mg Oral Daily With Dinner    benzonatate (TESSALON PERLES) capsule 100 mg  100 mg Oral TID PRN    cefepime (MAXIPIME) IVPB (premix in dextrose) 1,000 mg 50 mL  1,000 mg Intravenous Q12H    diazepam (VALIUM) tablet 5 mg  5 mg Oral Q6H PRN    Diclofenac Sodium (VOLTAREN) 1 % topical gel 2 g  2 g Topical 4x Daily    escitalopram (LEXAPRO) tablet 10 mg  10 mg Oral Daily    fentanyl citrate (PF) 100 MCG/2ML   Intravenous Code/Trauma/Sedation Med    ferrous sulfate tablet 325 mg  325 mg Oral Daily With Breakfast    guaiFENesin (MUCINEX) 12 hr tablet 1,200 mg  1,200 mg Oral Q12H [facility]    HYDROmorphone (DILAUDID) injection 0 5 mg  0 5 mg Intravenous Q2H PRN    HYDROmorphone (DILAUDID) tablet 2 mg  2 mg Oral Q4H PRN    HYDROmorphone (DILAUDID) tablet 4 mg  4 mg Oral Q4H PRN    insulin glargine (LANTUS) subcutaneous injection 35 Units 0 35 mL  35 Units Subcutaneous HS    insulin lispro (HumaLOG) 100 units/mL subcutaneous injection 18 Units  18 Units Subcutaneous TID With Meals    insulin lispro (HumaLOG) 100 units/mL subcutaneous injection 2-12 Units  2-12 Units Subcutaneous TID AC    insulin lispro (HumaLOG) 100 units/mL subcutaneous injection 2-12 Units  2-12 Units Subcutaneous HS    ipratropium (ATROVENT) 0 02 % inhalation solution 0 5 mg  0 5 mg Nebulization TID    levalbuterol (XOPENEX) inhalation solution 1 25 mg  1 25 mg Nebulization TID    lidocaine (LIDODERM) 5 % patch 2 patch  2 patch Topical Daily    losartan (COZAAR) tablet 50 mg  50 mg Oral Daily    metoprolol succinate (TOPROL-XL) 24 hr tablet 50 mg  50 mg Oral Q12H    pantoprazole (PROTONIX) EC tablet 40 mg  40 mg Oral Early Morning    potassium chloride (K-DUR,KLOR-CON) CR tablet 20 mEq  20 mEq Oral Daily    pregabalin (LYRICA) capsule 25 mg  25 mg Oral QPM    pregabalin (LYRICA) capsule 75 mg  75 mg Oral Daily    spironolactone (ALDACTONE) tablet 100 mg  100 mg Oral Daily    ticagrelor (BRILINTA) tablet 90 mg  90 mg Oral Q12H    trimethobenzamide (TIGAN) IM injection 200 mg  200 mg Intramuscular Q6H PRN    vancomycin (VANCOCIN) IVPB (premix in dextrose) 750 mg 150 mL  10 mg/kg Intravenous Q12H    and PTA meds:   Prior to Admission Medications   Prescriptions Last Dose Informant Patient Reported? Taking?    Benzocaine-Menthol 15-2 6 MG LOZG  Self No No   Sig: Apply 1 lozenge to the mouth or throat 4 (four) times a day as needed (sore throat)   Patient not taking: No sig reported   Blood Pressure Monitoring (Sphygmomanometer) MISC   No No   Sig: Use 2 (two) times a day   Patient not taking: No sig reported   Brilinta 90 MG   No No   Sig: TAKE 1 TABLET BY MOUTH EVERY 12 HOURS  Insulin Pen Needle 31G X 6 MM MISC  Self Yes No   Si Device by Does not apply route 4 (four) times a day   LORazepam (Ativan) 1 mg tablet   No No   Sig: Take 0 5 tablets (0 5 mg total) by mouth every 8 (eight) hours as needed for anxiety or sleep   Lancets MISC  Self Yes No   Sig: Use 1 Device 4 (four) times a day   Novofine Pen Needle 32G X 6 MM MISC   No No   Sig: USE 3 (THREE) TIMES A DAY WITH MEALS   acetaminophen (TYLENOL) 160 mg/5 mL suspension  Self No No   Si 4 mL (975 mg total) by Per G Tube route every 6 (six) hours as needed for mild pain or fever   albuterol (2 5 mg/3 mL) 0 083 % nebulizer solution  Self No No   Sig: Take 3 mL (2 5 mg total) by nebulization every 4 (four) hours as needed for wheezing or shortness of breath   amiodarone 100 mg tablet   No No   Sig: TAKE 1 TABLET BY MOUTH DAILY WITH BREAKFAST     apixaban (ELIQUIS) 5 mg  Self No No   Sig: Take 1 tablet (5 mg total) by mouth 2 (two) times a day   atorvastatin (LIPITOR) 40 mg tablet  Self No No   Sig: Take 1 tablet (40 mg total) by mouth daily   bumetanide (BUMEX) 2 mg tablet   No No   Sig: Take 1 tablet (2 mg total) by mouth 2 (two) times a day   chlorhexidine (PERIDEX) 0 12 % solution  Self No No   Sig: Apply 15 mL to the mouth or throat every 12 (twelve) hours   escitalopram (LEXAPRO) 10 mg tablet   No No   Sig: TAKE 1 TABLET BY MOUTH EVERY DAY   famotidine (PEPCID) 20 mg/2 5 mL oral suspension  Self No No   Sig: Take 2 5 mL (20 mg total) by mouth 2 (two) times a day   insulin aspart (NovoLOG FlexPen) 100 UNIT/ML injection pen   No No   Sig: Inject 4 Units under the skin 3 (three) times a day with meals   insulin glargine (Basaglar KwikPen) 100 units/mL injection pen   No No   Sig: Inject 16 Units under the skin daily   ipratropium (ATROVENT) 0 02 % nebulizer solution  Self No No   Sig: Take 2 5 mL (0 5 mg total) by nebulization 3 (three) times a day   levalbuterol (XOPENEX) 1 25 mg/0 5 mL nebulizer solution  Self No No   Sig: Take 0 5 mL (1 25 mg total) by nebulization 3 (three) times a day   losartan (COZAAR) 50 mg tablet   No No   Sig: Take 1 tablet (50 mg total) by mouth every 12 (twelve) hours   magnesium Oxide (MAG-OX) 400 mg TABS   Yes No   Sig: TAKE 1 TABLET (400 MG TOTAL) BY MOUTH 2 TIMES A DAY   melatonin 3 mg  Self No No   Sig: Take 2 tablets (6 mg total) by mouth daily at bedtime   Patient not taking: No sig reported   metoprolol succinate (TOPROL-XL) 50 mg 24 hr tablet   No No   Sig: Take 1 tablet (50 mg total) by mouth every 12 (twelve) hours   pantoprazole (PROTONIX) 40 mg tablet   No No   Sig: Take 1 tablet (40 mg total) by mouth daily   polyethylene glycol (MIRALAX) 17 g packet  Self No No   Sig: Take 17 g by mouth daily   Patient not taking: No sig reported   potassium chloride 40 MEQ/15ML (20%) oral solution   No No   Sig: Take 15 mL (40 mEq total) by mouth 2 (two) times a day   pregabalin (LYRICA) 75 mg capsule  Self No No   Sig: TAKE ONE CAPSULE EVERY DAY   senna-docusate sodium (SENOKOT S) 8 6-50 mg per tablet  Self No No   Sig: Take 1 tablet by mouth daily at bedtime   Patient not taking: No sig reported   spironolactone (ALDACTONE) 100 mg tablet   No No   Sig: TAKE 1 TABLET BY MOUTH EVERY DAY   traZODone (DESYREL) 50 mg tablet  Self Yes No   Sig: TAKE 0 5 TABLET (25MG) BY MOUTH NIGHTLY AS NEEDED FOR SLEEP     Patient not taking: No sig reported      Facility-Administered Medications: None       Allergies   Allergen Reactions    Metformin Diarrhea    Clonidine Rash     Patch        Objective     Temp:  [97 3 °F (36 3 °C)-99 6 °F (37 6 °C)] 99 6 °F (37 6 °C)  HR:  [69-91] 91  Resp:  [14-23] 17  BP: ()/(63-83) 116/68  FiO2 (%):  [28-30] 28    Physical Exam  Vitals reviewed  Constitutional:       Appearance: She is normal weight  HENT:      Head: Normocephalic and atraumatic  Nose: Nose normal       Mouth/Throat:      Comments: Congested cough  Pt with trach collar, able to phonate to talk  Eyes:      Extraocular Movements: Extraocular movements intact  Pupils: Pupils are equal, round, and reactive to light  Cardiovascular:      Rate and Rhythm: Normal rate  Pulses: Normal pulses  Pulmonary:      Comments: Breathing is baseline as per patient  Musculoskeletal:      Cervical back: Normal range of motion  Comments: Bandage over the chest wall removal site   Skin:     General: Skin is warm  Neurological:      General: No focal deficit present  Mental Status: She is alert and oriented to person, place, and time  Mental status is at baseline  Psychiatric:         Mood and Affect: Mood normal          Behavior: Behavior normal          Thought Content: Thought content normal          Judgment: Judgment normal          Lab Results:   Results from last 7 days   Lab Units 07/26/22  0507   WBC Thousand/uL 12 70*   HEMOGLOBIN g/dL 9 7*   HEMATOCRIT % 32 1*   PLATELETS Thousands/uL 554*      Results from last 7 days   Lab Units 07/27/22  0619 07/26/22  0507 07/25/22  0522   POTASSIUM mmol/L 3 8   < > 3 9   CHLORIDE mmol/L 94*   < > 98   CO2 mmol/L 27   < > 27   BUN mg/dL 35*   < > 31*   CREATININE mg/dL 1 06   < > 0 88   CALCIUM mg/dL 8 7   < > 8 7   ALK PHOS U/L  --   --  96   ALT U/L  --   --  7   AST U/L  --   --  10*    < > = values in this interval not displayed  Imaging Studies: I have personally reviewed pertinent reports  EKG, Pathology, and Other Studies: I have personally reviewed pertinent reports  Counseling / Coordination of Care  Total floor / unit time spent today 20 minutes   Greater than 50% of total time was spent with the patient and / or family counseling and / or coordination of care  A description of the counseling / coordination of care: discussed goals of pain control for her chest tube removal site  Communication with [facility] team regarding pain regimen  Please note that the APS provides consultative services regarding pain management only  With the exception of ketamine and epidural infusions and except when indicated, final decisions regarding starting or changing doses of analgesic medications are at the discretion of the consulting service  Off hours consultation and/or medication management is generally not available      Nguyễn Espinoza MD  Acute Pain Service

## 2022-07-28 NOTE — ASSESSMENT & PLAN NOTE
· Patient noted on imaging to have small pneumothorax at [facility], subsequently transferred to [facility]  · Chest tube placed 7/20, then upsized on 7/22 given no significant change and appearance that it was kinked  · Chest x-ray performed 7/24 -no pneumothorax at that time    Chest tube was subsequently removed

## 2022-07-29 ENCOUNTER — APPOINTMENT (INPATIENT)
Dept: RADIOLOGY | Facility: HOSPITAL | Age: 57
DRG: 199 | End: 2022-07-29
Attending: RADIOLOGY
Payer: COMMERCIAL

## 2022-07-29 ENCOUNTER — ANESTHESIA EVENT (INPATIENT)
Dept: ANESTHESIOLOGY | Facility: HOSPITAL | Age: 57
DRG: 199 | End: 2022-07-29
Payer: COMMERCIAL

## 2022-07-29 ENCOUNTER — ANESTHESIA (INPATIENT)
Dept: ANESTHESIOLOGY | Facility: HOSPITAL | Age: 57
DRG: 199 | End: 2022-07-29
Payer: COMMERCIAL

## 2022-07-29 ENCOUNTER — APPOINTMENT (INPATIENT)
Dept: SURGERY | Facility: HOSPITAL | Age: 57
DRG: 199 | End: 2022-07-29
Payer: COMMERCIAL

## 2022-07-29 PROBLEM — D64.9 ANEMIA: Status: ACTIVE | Noted: 2022-03-25

## 2022-07-29 LAB
ALBUMIN SERPL BCP-MCNC: 2.6 G/DL (ref 3.5–5)
ALP SERPL-CCNC: 91 U/L (ref 34–104)
ALT SERPL W P-5'-P-CCNC: 6 U/L (ref 7–52)
ANION GAP SERPL CALCULATED.3IONS-SCNC: 9 MMOL/L (ref 4–13)
APPEARANCE FLD: ABNORMAL
AST SERPL W P-5'-P-CCNC: 9 U/L (ref 13–39)
BACTERIA SPT RESP CULT: ABNORMAL
BASOPHILS # BLD AUTO: 0.04 THOUSANDS/ΜL (ref 0–0.1)
BASOPHILS NFR BLD AUTO: 0 % (ref 0–1)
BILIRUB DIRECT SERPL-MCNC: 0.12 MG/DL (ref 0–0.2)
BILIRUB SERPL-MCNC: 0.55 MG/DL (ref 0.2–1)
BUN SERPL-MCNC: 47 MG/DL (ref 5–25)
CALCIUM SERPL-MCNC: 8.5 MG/DL (ref 8.4–10.2)
CHLORIDE SERPL-SCNC: 96 MMOL/L (ref 96–108)
CO2 SERPL-SCNC: 26 MMOL/L (ref 21–32)
COLOR FLD: ABNORMAL
CREAT SERPL-MCNC: 1.33 MG/DL (ref 0.6–1.3)
EOSINOPHIL # BLD AUTO: 0.55 THOUSAND/ΜL (ref 0–0.61)
EOSINOPHIL NFR BLD AUTO: 3 % (ref 0–6)
ERYTHROCYTE [DISTWIDTH] IN BLOOD BY AUTOMATED COUNT: 17.3 % (ref 11.6–15.1)
GFR SERPL CREATININE-BSD FRML MDRD: 44 ML/MIN/1.73SQ M
GLUCOSE FLD-MCNC: 5 MG/DL
GLUCOSE SERPL-MCNC: 100 MG/DL (ref 65–140)
GLUCOSE SERPL-MCNC: 149 MG/DL (ref 65–140)
GLUCOSE SERPL-MCNC: 185 MG/DL (ref 65–140)
GLUCOSE SERPL-MCNC: 249 MG/DL (ref 65–140)
GLUCOSE SERPL-MCNC: 336 MG/DL (ref 65–140)
GLUCOSE SERPL-MCNC: 99 MG/DL (ref 65–140)
GRAM STN SPEC: ABNORMAL
HCT VFR BLD AUTO: 27.5 % (ref 34.8–46.1)
HGB BLD-MCNC: 8.2 G/DL (ref 11.5–15.4)
IMM GRANULOCYTES # BLD AUTO: 0.41 THOUSAND/UL (ref 0–0.2)
IMM GRANULOCYTES NFR BLD AUTO: 2 % (ref 0–2)
LDH FLD L TO P-CCNC: 3782 U/L
LDH SERPL-CCNC: 384 U/L (ref 140–271)
LYMPHOCYTES # BLD AUTO: 1.9 THOUSANDS/ΜL (ref 0.6–4.47)
LYMPHOCYTES NFR BLD AUTO: 1 %
LYMPHOCYTES NFR BLD AUTO: 11 % (ref 14–44)
MCH RBC QN AUTO: 22.9 PG (ref 26.8–34.3)
MCHC RBC AUTO-ENTMCNC: 29.8 G/DL (ref 31.4–37.4)
MCV RBC AUTO: 77 FL (ref 82–98)
MONOCYTES # BLD AUTO: 1.61 THOUSAND/ΜL (ref 0.17–1.22)
MONOCYTES NFR BLD AUTO: 1 %
MONOCYTES NFR BLD AUTO: 9 % (ref 4–12)
NEUTROPHILS # BLD AUTO: 12.75 THOUSANDS/ΜL (ref 1.85–7.62)
NEUTS SEG NFR BLD AUTO: 75 % (ref 43–75)
NEUTS SEG NFR BLD AUTO: 98 %
NRBC BLD AUTO-RTO: 0 /100 WBCS
OSMOLALITY UR: 380 MMOL/KG
PH BODY FLUID: 7.2
PLATELET # BLD AUTO: 505 THOUSANDS/UL (ref 149–390)
PMV BLD AUTO: 9.4 FL (ref 8.9–12.7)
POTASSIUM SERPL-SCNC: 4.3 MMOL/L (ref 3.5–5.3)
PROCALCITONIN SERPL-MCNC: 0.77 NG/ML
PROT FLD-MCNC: 5.5 G/DL
PROT SERPL-MCNC: 7.1 G/DL (ref 6.4–8.4)
PROT SERPL-MCNC: 7.2 G/DL (ref 6.4–8.4)
RBC # BLD AUTO: 3.58 MILLION/UL (ref 3.81–5.12)
SITE: ABNORMAL
SODIUM 24H UR-SCNC: 22 MOL/L
SODIUM SERPL-SCNC: 131 MMOL/L (ref 135–147)
TOTAL CELLS COUNTED SPEC: 100
VANCOMYCIN TROUGH SERPL-MCNC: 25.5 UG/ML (ref 10–20)
WBC # BLD AUTO: 17.26 THOUSAND/UL (ref 4.31–10.16)
WBC # FLD MANUAL: 9820 /UL

## 2022-07-29 PROCEDURE — 99232 SBSQ HOSP IP/OBS MODERATE 35: CPT | Performed by: INTERNAL MEDICINE

## 2022-07-29 PROCEDURE — 83935 ASSAY OF URINE OSMOLALITY: CPT | Performed by: PHYSICIAN ASSISTANT

## 2022-07-29 PROCEDURE — 80076 HEPATIC FUNCTION PANEL: CPT | Performed by: PHYSICIAN ASSISTANT

## 2022-07-29 PROCEDURE — 80048 BASIC METABOLIC PNL TOTAL CA: CPT | Performed by: PHYSICIAN ASSISTANT

## 2022-07-29 PROCEDURE — 87070 CULTURE OTHR SPECIMN AEROBIC: CPT | Performed by: NURSE PRACTITIONER

## 2022-07-29 PROCEDURE — 3E0T3BZ INTRODUCTION OF ANESTHETIC AGENT INTO PERIPHERAL NERVES AND PLEXI, PERCUTANEOUS APPROACH: ICD-10-PCS | Performed by: STUDENT IN AN ORGANIZED HEALTH CARE EDUCATION/TRAINING PROGRAM

## 2022-07-29 PROCEDURE — 83615 LACTATE (LD) (LDH) ENZYME: CPT | Performed by: NURSE PRACTITIONER

## 2022-07-29 PROCEDURE — 84300 ASSAY OF URINE SODIUM: CPT | Performed by: PHYSICIAN ASSISTANT

## 2022-07-29 PROCEDURE — 84145 PROCALCITONIN (PCT): CPT | Performed by: PHYSICIAN ASSISTANT

## 2022-07-29 PROCEDURE — 99232 SBSQ HOSP IP/OBS MODERATE 35: CPT | Performed by: PHYSICIAN ASSISTANT

## 2022-07-29 PROCEDURE — 84155 ASSAY OF PROTEIN SERUM: CPT | Performed by: NURSE PRACTITIONER

## 2022-07-29 PROCEDURE — 32557 INSERT CATH PLEURA W/ IMAGE: CPT | Performed by: RADIOLOGY

## 2022-07-29 PROCEDURE — 83986 ASSAY PH BODY FLUID NOS: CPT | Performed by: NURSE PRACTITIONER

## 2022-07-29 PROCEDURE — 82948 REAGENT STRIP/BLOOD GLUCOSE: CPT

## 2022-07-29 PROCEDURE — 89051 BODY FLUID CELL COUNT: CPT | Performed by: NURSE PRACTITIONER

## 2022-07-29 PROCEDURE — 99152 MOD SED SAME PHYS/QHP 5/>YRS: CPT | Performed by: RADIOLOGY

## 2022-07-29 PROCEDURE — 84157 ASSAY OF PROTEIN OTHER: CPT | Performed by: NURSE PRACTITIONER

## 2022-07-29 PROCEDURE — 82945 GLUCOSE OTHER FLUID: CPT | Performed by: NURSE PRACTITIONER

## 2022-07-29 PROCEDURE — C1769 GUIDE WIRE: HCPCS

## 2022-07-29 PROCEDURE — 0W9930Z DRAINAGE OF RIGHT PLEURAL CAVITY WITH DRAINAGE DEVICE, PERCUTANEOUS APPROACH: ICD-10-PCS | Performed by: RADIOLOGY

## 2022-07-29 PROCEDURE — 99152 MOD SED SAME PHYS/QHP 5/>YRS: CPT

## 2022-07-29 PROCEDURE — 80202 ASSAY OF VANCOMYCIN: CPT | Performed by: PHYSICIAN ASSISTANT

## 2022-07-29 PROCEDURE — 87205 SMEAR GRAM STAIN: CPT | Performed by: NURSE PRACTITIONER

## 2022-07-29 PROCEDURE — 85025 COMPLETE CBC W/AUTO DIFF WBC: CPT | Performed by: PHYSICIAN ASSISTANT

## 2022-07-29 PROCEDURE — 94640 AIRWAY INHALATION TREATMENT: CPT

## 2022-07-29 PROCEDURE — 32557 INSERT CATH PLEURA W/ IMAGE: CPT

## 2022-07-29 PROCEDURE — 94760 N-INVAS EAR/PLS OXIMETRY 1: CPT

## 2022-07-29 PROCEDURE — C1729 CATH, DRAINAGE: HCPCS

## 2022-07-29 RX ORDER — BUMETANIDE 1 MG/1
2 TABLET ORAL 2 TIMES DAILY
Status: DISCONTINUED | OUTPATIENT
Start: 2022-07-29 | End: 2022-07-29

## 2022-07-29 RX ORDER — FENTANYL CITRATE 50 UG/ML
INJECTION, SOLUTION INTRAMUSCULAR; INTRAVENOUS CODE/TRAUMA/SEDATION MEDICATION
Status: COMPLETED | OUTPATIENT
Start: 2022-07-29 | End: 2022-07-29

## 2022-07-29 RX ORDER — METOPROLOL SUCCINATE 50 MG/1
50 TABLET, EXTENDED RELEASE ORAL EVERY 12 HOURS
Status: DISCONTINUED | OUTPATIENT
Start: 2022-07-29 | End: 2022-08-02 | Stop reason: HOSPADM

## 2022-07-29 RX ORDER — BUMETANIDE 1 MG/1
2 TABLET ORAL 2 TIMES DAILY
Status: DISCONTINUED | OUTPATIENT
Start: 2022-07-29 | End: 2022-08-02 | Stop reason: HOSPADM

## 2022-07-29 RX ORDER — SPIRONOLACTONE 100 MG/1
100 TABLET, FILM COATED ORAL DAILY
Status: DISCONTINUED | OUTPATIENT
Start: 2022-07-29 | End: 2022-08-02 | Stop reason: HOSPADM

## 2022-07-29 RX ORDER — MIDAZOLAM HYDROCHLORIDE 2 MG/2ML
INJECTION, SOLUTION INTRAMUSCULAR; INTRAVENOUS CODE/TRAUMA/SEDATION MEDICATION
Status: COMPLETED | OUTPATIENT
Start: 2022-07-29 | End: 2022-07-29

## 2022-07-29 RX ORDER — BUPIVACAINE HYDROCHLORIDE 2.5 MG/ML
INJECTION, SOLUTION EPIDURAL; INFILTRATION; INTRACAUDAL
Status: COMPLETED | OUTPATIENT
Start: 2022-07-29 | End: 2022-07-29

## 2022-07-29 RX ORDER — MIDAZOLAM HYDROCHLORIDE 2 MG/2ML
INJECTION, SOLUTION INTRAMUSCULAR; INTRAVENOUS AS NEEDED
Status: DISCONTINUED | OUTPATIENT
Start: 2022-07-29 | End: 2022-07-29 | Stop reason: HOSPADM

## 2022-07-29 RX ADMIN — TICAGRELOR 90 MG: 90 TABLET ORAL at 18:07

## 2022-07-29 RX ADMIN — ESCITALOPRAM OXALATE 10 MG: 10 TABLET ORAL at 11:59

## 2022-07-29 RX ADMIN — Medication: at 18:05

## 2022-07-29 RX ADMIN — METOPROLOL SUCCINATE 50 MG: 50 TABLET, EXTENDED RELEASE ORAL at 18:08

## 2022-07-29 RX ADMIN — INSULIN LISPRO 4 UNITS: 100 INJECTION, SOLUTION INTRAVENOUS; SUBCUTANEOUS at 18:08

## 2022-07-29 RX ADMIN — PREGABALIN 75 MG: 75 CAPSULE ORAL at 11:55

## 2022-07-29 RX ADMIN — BUMETANIDE 2 MG: 1 TABLET ORAL at 18:08

## 2022-07-29 RX ADMIN — GUAIFENESIN 1200 MG: 600 TABLET ORAL at 11:51

## 2022-07-29 RX ADMIN — INSULIN LISPRO 2 UNITS: 100 INJECTION, SOLUTION INTRAVENOUS; SUBCUTANEOUS at 14:04

## 2022-07-29 RX ADMIN — LEVALBUTEROL HYDROCHLORIDE 1.25 MG: 1.25 SOLUTION, CONCENTRATE RESPIRATORY (INHALATION) at 14:10

## 2022-07-29 RX ADMIN — SPIRONOLACTONE 100 MG: 100 TABLET ORAL at 11:48

## 2022-07-29 RX ADMIN — BUMETANIDE 2 MG: 1 TABLET ORAL at 11:47

## 2022-07-29 RX ADMIN — PREGABALIN 25 MG: 25 CAPSULE ORAL at 18:07

## 2022-07-29 RX ADMIN — DESMOPRESSIN ACETATE 40 MG: 0.2 TABLET ORAL at 18:07

## 2022-07-29 RX ADMIN — HYDROMORPHONE HYDROCHLORIDE 2 MG: 2 TABLET ORAL at 19:17

## 2022-07-29 RX ADMIN — TICAGRELOR 90 MG: 90 TABLET ORAL at 05:23

## 2022-07-29 RX ADMIN — PANTOPRAZOLE SODIUM 40 MG: 40 TABLET, DELAYED RELEASE ORAL at 05:23

## 2022-07-29 RX ADMIN — IPRATROPIUM BROMIDE 0.5 MG: 0.5 SOLUTION RESPIRATORY (INHALATION) at 07:51

## 2022-07-29 RX ADMIN — LEVALBUTEROL HYDROCHLORIDE 1.25 MG: 1.25 SOLUTION, CONCENTRATE RESPIRATORY (INHALATION) at 19:21

## 2022-07-29 RX ADMIN — LIDOCAINE 5% 2 PATCH: 700 PATCH TOPICAL at 11:51

## 2022-07-29 RX ADMIN — IPRATROPIUM BROMIDE 0.5 MG: 0.5 SOLUTION RESPIRATORY (INHALATION) at 14:10

## 2022-07-29 RX ADMIN — FENTANYL CITRATE 50 MCG: 50 INJECTION, SOLUTION INTRAMUSCULAR; INTRAVENOUS at 15:19

## 2022-07-29 RX ADMIN — LEVALBUTEROL HYDROCHLORIDE 1.25 MG: 1.25 SOLUTION, CONCENTRATE RESPIRATORY (INHALATION) at 07:51

## 2022-07-29 RX ADMIN — Medication: at 14:06

## 2022-07-29 RX ADMIN — INSULIN LISPRO 8 UNITS: 100 INJECTION, SOLUTION INTRAVENOUS; SUBCUTANEOUS at 22:08

## 2022-07-29 RX ADMIN — BUPIVACAINE HYDROCHLORIDE 30 ML: 2.5 INJECTION, SOLUTION EPIDURAL; INFILTRATION; INTRACAUDAL at 11:03

## 2022-07-29 RX ADMIN — INSULIN LISPRO 19 UNITS: 100 INJECTION, SOLUTION INTRAVENOUS; SUBCUTANEOUS at 18:09

## 2022-07-29 RX ADMIN — IPRATROPIUM BROMIDE 0.5 MG: 0.5 SOLUTION RESPIRATORY (INHALATION) at 19:21

## 2022-07-29 RX ADMIN — MIDAZOLAM 2 MG: 1 INJECTION INTRAMUSCULAR; INTRAVENOUS at 10:44

## 2022-07-29 RX ADMIN — AMIODARONE HYDROCHLORIDE 100 MG: 200 TABLET ORAL at 11:48

## 2022-07-29 RX ADMIN — APIXABAN 5 MG: 5 TABLET, FILM COATED ORAL at 18:08

## 2022-07-29 RX ADMIN — INSULIN GLARGINE 38 UNITS: 100 INJECTION, SOLUTION SUBCUTANEOUS at 22:06

## 2022-07-29 RX ADMIN — FENTANYL CITRATE 100 MCG: 50 INJECTION, SOLUTION INTRAMUSCULAR; INTRAVENOUS at 10:46

## 2022-07-29 RX ADMIN — APIXABAN 5 MG: 5 TABLET, FILM COATED ORAL at 11:48

## 2022-07-29 RX ADMIN — CEFEPIME HYDROCHLORIDE 2000 MG: 2 INJECTION, SOLUTION INTRAVENOUS at 21:58

## 2022-07-29 RX ADMIN — MIDAZOLAM HYDROCHLORIDE 2 MG: 1 INJECTION, SOLUTION INTRAMUSCULAR; INTRAVENOUS at 15:14

## 2022-07-29 RX ADMIN — CEFEPIME HYDROCHLORIDE 2000 MG: 2 INJECTION, SOLUTION INTRAVENOUS at 13:00

## 2022-07-29 RX ADMIN — GUAIFENESIN 1200 MG: 600 TABLET ORAL at 22:00

## 2022-07-29 RX ADMIN — FENTANYL CITRATE 50 MCG: 50 INJECTION, SOLUTION INTRAMUSCULAR; INTRAVENOUS at 15:14

## 2022-07-29 NOTE — ASSESSMENT & PLAN NOTE
· STEMI 10/22/2021 s/p PATRICA mid circumflex OM1  OM2 was ballooned but not stented  · Pulseless VT 10/27/21 - repeat LHC 90% mid circumflex stenosis, but could not be intervened on  · VT 10/28/21 LHC:  found to have apical LAD complete occlusion was felt to be small to be intervened    · Cardiac carrest 1/1/22 - [facility] felt to be hypoxic vs  VT induced  · On beta-blocker, Brilinta and Lipitor

## 2022-07-29 NOTE — PROGRESS NOTES
Progress Note - Pulmonary   Rosea Fish 65 y o  female MRN: [id number]  Unit/Bed#: S -01 Encounter: [id number]    Assessment/Plan:    1  Acute pulmonary insufficiency on chronic hypoxic respiratory failure      -  currently on 6 L trach collar, 94%, home O2 requirements 10 L      -  continue maintain saturations greater than 89%      -  pulmonary toilet:  Deep breathing cough, OOB as tolerated, IS Q 1 hr    2  RLL loculated effusion       -  small empyema       -  scheduled for IR chest tube today       -  pleural fluid studies ordered       -  ID- following        -  Day # 4 cefepime, vancomycin       -  procalcitonin- 0 96-- 0 68-- 0 77        -  may need tPA/dornase    3  Primary spontaneous right-sided pneumothorax now resolved       -  etiology unclear possibly secondary to bleb       -  7/20/2022- 10 Fr  CT       -  7/22/2022- upsize 14 Fr  CT       -  7/24/2022- chest tube removed       -  will continue supportive care no indication for any further    4  Acute on chronic diastolic CHF w/ monitor PHTN likely WHO group II & III and PAF       -  2/2022-  EF 50%    PA pressure 54 mmHg       -   will continue I&Os and daily weights       -  cardiology follow-up    5  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       -  11/2022- tracheostomy placed       -  maintained on 6 Shiley uncuffed       -  continue trach care and suction as needed    6  Suspected COPD of unknown severity without acute exacerbation        -  Inpatient:  Albuterol as needed        -  home regimen:  Albuterol q 6h p r n         -  no indication for steroids at this time    7  Mild CHANTALE       -  follows with Dr Sotero Montilla       -  will resume BiPAP once closer to discharge                     Chief Complaint:    "This chest tube is going to hurt"    Subjective:    Kelli was comfortably sitting in her bed  She reports that she has never is back adding another chest tube as the previous 1 cause significant pain    No significant overnight events reported  Patient currently denying any fevers, chills, hemoptysis, headaches night sweats, pleuritic chest pain, palpitations  Objective:    Vitals: Blood pressure 122/65, pulse 77, temperature 97 6 °F (36 4 °C), resp  rate 17, weight 77 5 kg (170 lb 12 8 oz), SpO2 98 %  RA,Body mass index is 25 22 kg/m²  No intake or output data in the 24 hours ending 07/29/22 1148    Invasive Devices  Report    Peripheral Intravenous Line  Duration           Peripheral IV 07/28/22 Right Antecubital 1 day          Epidural Line  Duration           Nerve Block Catheter 07/29/22 <1 day          Airway  Duration           Surgical Airway Shiley Fenestrated 149 days                Physical Exam:   Physical Exam  Constitutional:       General: She is not in acute distress  Appearance: Normal appearance  She is normal weight  She is not ill-appearing  HENT:      Head: Normocephalic and atraumatic  Nose: Nose normal  No congestion or rhinorrhea  Mouth/Throat:      Mouth: Mucous membranes are dry  Pharynx: No oropharyngeal exudate or posterior oropharyngeal erythema  Cardiovascular:      Rate and Rhythm: Normal rate and regular rhythm  Pulses: Normal pulses  Heart sounds: Normal heart sounds  No murmur heard  No friction rub  No gallop  Pulmonary:      Effort: Pulmonary effort is normal  No tachypnea, bradypnea, accessory muscle usage or respiratory distress  Breath sounds: Decreased air movement present  No stridor  Decreased breath sounds and rhonchi present  No wheezing or rales  Comments: Scattered rhonchi  Chest:      Chest wall: No tenderness  Abdominal:      General: Abdomen is flat  Bowel sounds are normal  There is no distension  Palpations: Abdomen is soft  There is no mass  Musculoskeletal:         General: No swelling or tenderness  Normal range of motion  Cervical back: Normal range of motion  No rigidity or tenderness     Skin: General: Skin is warm and dry  Coloration: Skin is not jaundiced or pale  Neurological:      General: No focal deficit present  Mental Status: She is alert and oriented to person, place, and time  Mental status is at baseline  Psychiatric:         Mood and Affect: Mood normal          Behavior: Behavior normal          Labs:    I have personally reviewed pertinent lab results CBC:   Lab Results   Component Value Date    WBC 17 26 (H) 07/29/2022    HGB 8 2 (L) 07/29/2022    HCT 27 5 (L) 07/29/2022    MCV 77 (L) 07/29/2022     (H) 07/29/2022    MCH 22 9 (L) 07/29/2022    MCHC 29 8 (L) 07/29/2022    RDW 17 3 (H) 07/29/2022    MPV 9 4 07/29/2022    NRBC 0 07/29/2022   , CMP:   Lab Results   Component Value Date    SODIUM 131 (L) 07/29/2022    K 4 3 07/29/2022    CL 96 07/29/2022    CO2 26 07/29/2022    BUN 47 (H) 07/29/2022    CREATININE 1 33 (H) 07/29/2022    CALCIUM 8 5 07/29/2022    AST 9 (L) 07/29/2022    ALT 6 (L) 07/29/2022    ALKPHOS 91 07/29/2022    EGFR 44 07/29/2022       Imaging and other studies: I have personally reviewed pertinent films in PACS     Chest x-ray- 7/28/2022- right-sided loculated effusion

## 2022-07-29 NOTE — ASSESSMENT & PLAN NOTE
· Impacted by hyperglycemia as well and pain, infection  · Check osm studies  · continue fluid restriction that was recommended  Recommend adherence to this and follow-up BMP in a m

## 2022-07-29 NOTE — ASSESSMENT & PLAN NOTE
Lab Results   Component Value Date    EGFR 44 07/29/2022    EGFR 57 07/28/2022    EGFR 58 07/27/2022    CREATININE 1 33 (H) 07/29/2022    CREATININE 1 08 07/28/2022    CREATININE 1 06 07/27/2022   · Creatinine elevated at 1 64 upon transfer on 7/19/22, with baseline being 0 9-1 0  · Trended down to 0 88, but now rising again to 1 33  · Monitor carefully--check bladder scan/urinary retention protocol to ensure no obstructive pathology  · Hold further doses of losartan  Placed higher parameter on Aldactone

## 2022-07-29 NOTE — ASSESSMENT & PLAN NOTE
· STEMI 10/22/21 - PATRICA to mid circumflex  · VT arrest 10/26   · Polymorphic VT x 1 shock 10/28/21  · ICD not placed given risks felt to outweigh benefits with cognitive function and risk of infection at that that time  · Cardiac arrest 1/1/22 - likely VT related - went to [facility]  · No ICD given lung infection and on IV ABX x 4 weeks  · Canceled 2 EP appts since that time and thus no ICD in place - still wears LifeVest  · EF seems to have always been 40% or higher

## 2022-07-29 NOTE — PROGRESS NOTES
General Cardiology   Progress Note -  [facility]   Toño Gallegos 65 y o  female MRN: [id number]    Unit/Bed#: S -01 Encounter: [id number]    Assessment  1  Acute on Chronic HFmEF  2  ICM w/improved EF to 50% (previously 40-45% in 10/2021)  -On PE -- appears euvolemic  -BNP level 165 (7/24)  -CT chest 7/28/22 - moderate partially loculated right pleural effusion (possible bland effusion or empyema), mild interstitial edema, minimal new inflammatory ground-glass opacity in the TUCKER  -TTE 2/2022 - LVEF 50%, moderate hypokinesis of the inferior in the basal anterior lateral wall, moderate concentric hypertrophy, LA/RA mildly dilated, RV normal size/systolic function, mild TR   -GDMT - losartan 50 mg daily, spironolactone 100 mg daily, and metoprolol succinate 50 mg b i d   -Previously on oral Bumex 2 mg b i d as an outpatient (recently switched from furosemide 60 mg b i d  on 7/5)   -Underwent a course of IV diuresis which had been discontinued on 7/27  Currently on no standing oral diuretics  -24 hour I&O balance; +240 mL; overall -6 1 L  -Weights; baseline/dry weights appear to be in the low to mid 180s   Standing scale weight on 7/29 - 170 lb     3  CAD / lateral wall STEMI (10/22/2021) s/p PCI/PATRICA x 1 mid LCX into OM1, OM2 underwent balloon  -Denies anginal symptoms  -LHC 10/28/21 showed patent OM1 stent, jailed mLCx, and new distal LAD occlusion too small for intervention     -On Brilinta 90 mg q12h, Eliquis 5 mg b i d , high-intensity statin, and BB      4  Paroxysmal atrial fibrillation  5  Hx of VT cardiac arrest  -Maintaining NSR   -On amiodarone 100 mg daily, and metoprolol succinate 50 mg b i d   -Wearing life vest at home   Pending EP follow-up as an outpatient for ICD consideration      6  Essential hypertension  -Average  /61, last recorded 109/66   -BP regimen - losartan 50 mg daily, metoprolol succinate 50 mg b i d , and spironolactone 100 mg daily       8  Poorly controlled DM  -HGB A1c 12 7 - 5/2022      Plan  -No overt volume overload present on physical exam, however CT imaging suggestive of a new right-sided loculated pleural effusion (bland effusion vs empyema) and mild interstitial edema  Pt denies any progressive/worsening respiratory symptoms of recent and O2 requirements have remained stable  Would restart oral diuretics w/ Bumex 2 mg b i d  and continue spironolactone 100 mg daily w/ hold parameters  Tentative plan for IR guided right-sided chest tube placement/effusion drainage with fluid analysis today  -IV antibiotics per the Infectious disease service  -BP borderline, would hold losartan - but continue BB and oral diuretics if BP remains stable  -Continue amiodarone 100 mg daily  No significant arrhythmias identified on telemetry review   -Continue strict I&Os, daily standing weights, and 2 g NA +diet 1 8 L FR    -Monitor renal function electrolytes closely, replete to maintain K +level 4 0 and magnesium at 2 0   -Re-application of Life Vest upon discharge   -Follow-up with  General Cardiology & EP service upon discharge for ICD consideration    Subjective  Review of Systems   Constitutional: Positive for malaise/fatigue  Negative for chills and fever  Eyes: Negative for visual disturbance  Cardiovascular: Negative for chest pain, dyspnea on exertion, leg swelling, orthopnea and palpitations  Musculoskeletal: Positive for back pain  Right chest wall / and right-sided back pain  Gastrointestinal: Negative for abdominal pain  Neurological: Negative for dizziness, headaches and light-headedness  Objective:   Physical Exam  Vitals and nursing note reviewed  Constitutional:       General: She is not in acute distress  Appearance: She is not diaphoretic  HENT:      Head: Normocephalic and atraumatic  Eyes:      General: No scleral icterus  Neck:      Comments: Tracheostomy   Cardiovascular:      Rate and Rhythm: Normal rate and regular rhythm  Pulses: Normal pulses  Heart sounds: Normal heart sounds  Pulmonary:      Effort: Pulmonary effort is normal       Breath sounds: No wheezing or rales  Abdominal:      Palpations: Abdomen is soft  Musculoskeletal:      Right lower leg: No edema  Left lower leg: No edema  Skin:     General: Skin is warm and dry  Capillary Refill: Capillary refill takes less than 2 seconds  Neurological:      General: No focal deficit present  Mental Status: She is alert  Psychiatric:         Mood and Affect: Mood normal          Vitals: Blood pressure 109/66, pulse 70, temperature 98 1 °F (36 7 °C), resp  rate 18, weight 77 5 kg (170 lb 12 8 oz), SpO2 95 %  ,     Body mass index is 25 22 kg/m²  ,   Systolic (48ZUL), QSM:647 , Min:98 , GWV:654     Diastolic (39KAV), FAR:08, Min:54, Max:68      Intake/Output Summary (Last 24 hours) at 7/29/2022 0848  Last data filed at 7/28/2022 0904  Gross per 24 hour   Intake 240 ml   Output --   Net 240 ml     Weight (last 2 days)     Date/Time Weight    07/29/22 0600 77 5 (170 8)    07/28/22 0600 77 9 (171 8)    07/27/22 0600 76 9 (169 6)          LABORATORY RESULTS      CBC with diff:   Results from last 7 days   Lab Units 07/29/22  0447 07/28/22  0504 07/26/22  0507 07/25/22  0522 07/24/22  1213 07/23/22  0502   WBC Thousand/uL 17 26* 17 34* 12 70* 15 82* 21 17* 21 72*   HEMOGLOBIN g/dL 8 2* 8 4* 9 7* 8 7* 9 0* 9 4*   HEMATOCRIT % 27 5* 26 7* 32 1* 28 7* 28 9* 31 0*   MCV fL 77* 75* 76* 76* 76* 78*   PLATELETS Thousands/uL 505* 487* 554* 423* 449* 433*   MCH pg 22 9* 23 6* 23 0* 23 0* 23 6* 23 6*   MCHC g/dL 29 8* 31 5 30 2* 30 3* 31 1* 30 3*   RDW % 17 3* 17 3* 17 3* 17 2* 17 2* 17 0*   MPV fL 9 4 10 0 9 8 9 9 10 2 9 6   NRBC AUTO /100 WBCs 0 0 0 0 0 0       CMP:  Results from last 7 days   Lab Units 07/29/22  0447 07/28/22  0504 07/27/22  0619 07/26/22  0507 07/25/22  0522 07/24/22  0508 07/23/22  0502   POTASSIUM mmol/L 4 3 4 4 3 8 4 0 3 9 3 2* 3 7   CHLORIDE mmol/L 96 95* 94* 93* 98 94* 96   CO2 mmol/L 26 26 27 28 27 30 31   BUN mg/dL 47* 43* 35* 33* 31* 36* 38*   CREATININE mg/dL 1 33* 1 08 1 06 1 18 0 88 1 03 1 15   CALCIUM mg/dL 8 5 8 4 8 7 9 1 8 7 9 3 8 8   AST U/L 9*  --   --   --  10*  --   --    ALT U/L 6*  --   --   --  7  --   --    ALK PHOS U/L 91  --   --   --  96  --   --    EGFR ml/min/1 73sq m 44 57 58 51 73 60 53       BMP:  Results from last 7 days   Lab Units 07/29/22  0447 07/28/22  0504 07/27/22  0619 07/26/22  0507 07/25/22  0522 07/24/22  0508 07/23/22  0502   POTASSIUM mmol/L 4 3 4 4 3 8 4 0 3 9 3 2* 3 7   CHLORIDE mmol/L 96 95* 94* 93* 98 94* 96   CO2 mmol/L 26 26 27 28 27 30 31   BUN mg/dL 47* 43* 35* 33* 31* 36* 38*   CREATININE mg/dL 1 33* 1 08 1 06 1 18 0 88 1 03 1 15   CALCIUM mg/dL 8 5 8 4 8 7 9 1 8 7 9 3 8 8       Lab Results   Component Value Date    NTBNP 4,179 (H) 06/27/2022    NTBNP 4,406 (H) 06/06/2022    NTBNP 3,158 (H) 10/22/2021        Results from last 7 days   Lab Units 07/25/22  0522 07/24/22  0508   MAGNESIUM mg/dL 1 6* 1 6*                         Lipid Profile:   No results found for: CHOL  Lab Results   Component Value Date    HDL 55 10/23/2021    HDL 45 (L) 04/02/2021    HDL 44 05/26/2020     Lab Results   Component Value Date    LDLCALC 36 10/23/2021    LDLCALC 44 04/02/2021    LDLCALC 68 05/26/2020     Lab Results   Component Value Date    TRIG 75 10/23/2021    TRIG 133 7 04/02/2021    TRIG 157 (H) 05/26/2020       Cardiac testing:   No results found for this or any previous visit  No results found for this or any previous visit  No results found for this or any previous visit  No valid procedures specified  No results found for this or any previous visit        Meds/Allergies   all current active meds have been reviewed and current meds:   Current Facility-Administered Medications   Medication Dose Route Frequency    acetaminophen (TYLENOL) tablet 975 mg  975 mg Oral Q8H [address]    albuterol inhalation solution 2 5 mg  2 5 mg Nebulization Q4H PRN    amiodarone tablet 100 mg  100 mg Oral Daily With Breakfast    apixaban (ELIQUIS) tablet 5 mg  5 mg Oral BID    atorvastatin (LIPITOR) tablet 40 mg  40 mg Oral Daily With Dinner    benzonatate (TESSALON PERLES) capsule 100 mg  100 mg Oral TID PRN    bumetanide (BUMEX) tablet 2 mg  2 mg Oral BID    cefepime (MAXIPIME) IVPB (premix in dextrose) 2,000 mg 50 mL  2,000 mg Intravenous Q12H    diazepam (VALIUM) tablet 5 mg  5 mg Oral Q6H PRN    escitalopram (LEXAPRO) tablet 10 mg  10 mg Oral Daily    fentanyl citrate (PF) 100 MCG/2ML   Intravenous Code/Trauma/Sedation Med    ferrous sulfate tablet 325 mg  325 mg Oral Daily With Breakfast    guaiFENesin (MUCINEX) 12 hr tablet 1,200 mg  1,200 mg Oral Q12H CEFERINO    HYDROmorphone (DILAUDID) injection 0 5 mg  0 5 mg Intravenous Q2H PRN    HYDROmorphone (DILAUDID) tablet 2 mg  2 mg Oral Q4H PRN    HYDROmorphone (DILAUDID) tablet 4 mg  4 mg Oral Q4H PRN    insulin glargine (LANTUS) subcutaneous injection 38 Units 0 38 mL  38 Units Subcutaneous HS    insulin lispro (HumaLOG) 100 units/mL subcutaneous injection 19 Units  19 Units Subcutaneous TID With Meals    insulin lispro (HumaLOG) 100 units/mL subcutaneous injection 2-12 Units  2-12 Units Subcutaneous TID AC    insulin lispro (HumaLOG) 100 units/mL subcutaneous injection 2-12 Units  2-12 Units Subcutaneous HS    ipratropium (ATROVENT) 0 02 % inhalation solution 0 5 mg  0 5 mg Nebulization TID    levalbuterol (XOPENEX) inhalation solution 1 25 mg  1 25 mg Nebulization TID    lidocaine (LIDODERM) 5 % patch 2 patch  2 patch Topical Daily    metoprolol succinate (TOPROL-XL) 24 hr tablet 50 mg  50 mg Oral Q12H    pantoprazole (PROTONIX) EC tablet 40 mg  40 mg Oral Early Morning    pregabalin (LYRICA) capsule 25 mg  25 mg Oral QPM    pregabalin (LYRICA) capsule 75 mg  75 mg Oral Daily    spironolactone (ALDACTONE) tablet 100 mg  100 mg Oral Daily    ticagrelor (BRILINTA) tablet 90 mg  90 mg Oral Q12H    trimethobenzamide (TIGAN) IM injection 200 mg  200 mg Intramuscular Q6H PRN    vancomycin (VANCOCIN) IVPB (premix in dextrose) 750 mg 150 mL  10 mg/kg Intravenous Q12H          EKG personally reviewed by NEELAM Castro    Assessment:  Principal Problem:    Persistent pain despite chest tube removal  Active Problems:    Type 2 diabetes mellitus with hyperglycemia (HCC)    A-fib (HCC)    History of Cardiac arrest (HCC)    CAD (coronary artery disease)    Tracheostomy in place [facility])    Hyponatremia    Respiratory Distress      Counseling / Coordination of Care  Total floor / unit time spent today 20 minutes  Greater than 50% of total time was spent with the patient and / or family counseling and / or coordination of care  ** Please Note: Dragon 360 Dictation voice to text software may have been used in the creation of this document   **

## 2022-07-29 NOTE — ANESTHESIA PROCEDURE NOTES
Peripheral Block    Patient location during procedure: holding area  Start time: 7/29/2022 11:03 AM  Reason for block: at surgeon's request and post-op pain management  Staffing  Performed: Anesthesiologist   Anesthesiologist: Truman Sage MD  Preanesthetic Checklist  Completed: patient identified, IV checked, site marked, risks and benefits discussed, surgical consent, monitors and equipment checked, pre-op evaluation and timeout performed  Peripheral Block  Patient position: sitting  Prep: ChloraPrep  Patient monitoring: continuous pulse ox, frequent blood pressure checks and heart rate  Anesthesia block type: Erector Spinae  Laterality: left  Injection technique: catheter  Procedures: ultrasound guided, Ultrasound guidance required for the procedure to increase accuracy and safety of medication placement and decrease risk of complications  Ultrasound permanent image savedbupivacaine (PF) (MARCAINE) 0 25 % 10 mL - Perineural   30 mL - 7/29/2022 11:03:00 AM  Needle  Needle type: Tuohy   Needle gauge: 18G  Needle length: 9cm  Needle localization: ultrasound guidance  Needle insertion depth: 5 cm  Catheter type: closed end  Catheter size: 20G    Catheter at skin depth: 12 cm  Assessment  Injection assessment: incremental injection, local visualized surrounding nerve on ultrasound, negative aspiration for heme and no paresthesia on injection  Paresthesia pain: none  Post-procedure:  adhesive bandage applied, sterile dressing applied and secured with tape  patient tolerated the procedure well with no immediate complications  Additional Notes  Uncomplicated left erector spinae catheter  Bolused 0 25% bupivacaine with 4mg perineural decadron upon catheter placement  No issues with catheter threading/bolusing  Erector spinae muscles seen lifting

## 2022-07-29 NOTE — PROGRESS NOTES
Progress Note - Infectious Disease   Parisa Guerra 65 y o  female MRN: [id number]  Unit/Bed#: S -01 Encounter: [id number]      IMPRESSION & RECOMMENDATIONS:   Impression/Recommendations:  1  Developing sepsis  Tachypnea, leukocytosis  Secondary to #2  With worsening leukocytosis likely due to source control issue  Otherwise remains hemodynamically stable      -continue IV vancomycin for now with dosing recommendations for pharmacy  -continue IV cefepime  -follow temperatures and hemodynamics  -follow CBC     2  Loculated right pleural effusion/possible empyema  Most recent CT shows new partially loculated right pleural effusion  No chest wall or lung abscesses  Sputum culture from 07/26 so far showing MSSA and Pseudomonas  Patient does have history of MRSA pneumonia in late 2021  IR is planning new chest tube  Will continue broader coverage for now pending cultures of pleural fluid      -antibiotics as above  -IR/pulmonology follow-up ongoing  Plan for new chest tube placement noted  Will follow up cultures and tailor antibiotics accordingly  3  Chest wall wound infection  At the site of recent chest tube placement for management #4  CT shows small loculated component at this site with no fluid collection in the chest wall  Superficial culture is pending, Gram stain shows GPCs in pairs     -antibiotic as above  -follow-up wound culture from 07/28  -serial exams  -daily local wound care and dressing changes     4  Recent spontaneous pneumothorax  Status post chest tube placement on 07/20, removal on 07/24  Resolved on repeat imaging      5  Acute respiratory insufficiency, on chronic hypoxic respiratory failure/tracheostomy dependence  Multifactorial in the setting of recent pneumothorax, new right pleural effusion, acute CHF, COPD      -antibiotic plan as above  -monitor O2 requirements  -pulmonology follow-up ongoing  -volume management as per Internal Medicine Service     6   DM 2, with hyperglycemia and elevated hemoglobin A1c of 12 7  Risk factor for infection  Glycemic control as per Internal Medicine Service      7  Acute kidney injury, on CKD 3  Creatinine is now much improved      -dose adjust antibiotic based on renal function as indicated  -follow BMP     Antibiotics:  Vancomycin/cefepime 4           Subjective:  No reported acute overnight events  No fevers  Stable O2 requirements  Objective:  Vitals:  Temp:  [97 4 °F (36 3 °C)-98 6 °F (37 °C)] 98 1 °F (36 7 °C)  HR:  [65-77] 70  Resp:  [18] 18  BP: ()/(54-68) 109/66  SpO2:  [95 %-100 %] 95 %  Temp (24hrs), Av 2 °F (36 8 °C), Min:97 4 °F (36 3 °C), Max:98 6 °F (37 °C)  Current: Temperature: 98 1 °F (36 7 °C)    Physical Exam:   General:  No acute distress, debilitated, nontoxic  HEENT:  Atraumatic normocephalic  Neck:  Trach in place  Psychiatric:  Awake and alert  Pulmonary:  Normal respiratory excursion without accessory muscle use  Prior chest tube site with mild periwound erythema  No fluctuance  Mild drainage  Abdomen:  Soft, nontender  Extremities:  Mild edema  Skin:  No diffuse rashes    Lab Results:  I have personally reviewed pertinent labs  Results from last 7 days   Lab Units 22  0504 22  0619 22  0507 22  0522   POTASSIUM mmol/L 4 3 4 4 3 8   < > 3 9   CHLORIDE mmol/L 96 95* 94*   < > 98   CO2 mmol/L 26 26 27   < > 27   BUN mg/dL 47* 43* 35*   < > 31*   CREATININE mg/dL 1 33* 1 08 1 06   < > 0 88   EGFR ml/min/1 73sq m 44 57 58   < > 73   CALCIUM mg/dL 8 5 8 4 8 7   < > 8 7   AST U/L 9*  --   --   --  10*   ALT U/L 6*  --   --   --  7   ALK PHOS U/L 91  --   --   --  96    < > = values in this interval not displayed       Results from last 7 days   Lab Units 22  0504 22  0507   WBC Thousand/uL 17 26* 17 34* 12 70*   HEMOGLOBIN g/dL 8 2* 8 4* 9 7*   PLATELETS Thousands/uL 505* 487* 554*     Results from last 7 days   Lab Units 22  1201 07/26/22  1349 07/26/22  1109   SPUTUM CULTURE   --  2+ Growth of Staphylococcus aureus*  1+ Growth of Pseudomonas aeruginosa*  1+ Growth of   --    GRAM STAIN RESULT  3+ Polys*  2+ Gram positive cocci in pairs* 1+ Epithelial cells per low power field*  3+ Polys*  1+ Gram positive cocci in pairs*  Rare Gram positive rods*  --    MRSA CULTURE ONLY   --   --  No Methicillin Resistant Staphlyococcus aureus (MRSA) isolated       Imaging Studies:   I have personally reviewed pertinent imaging study reports and images in PACS  EKG, Pathology, and Other Studies:   I have personally reviewed pertinent reports

## 2022-07-29 NOTE — PROGRESS NOTES
Vancomycin Assessment    Kelli Daly is a 65 y o  female who is currently receiving vancomycin 750 mg every 12 hours for Pneumonia     Relevant clinical data and objective history reviewed:  Creatinine   Date Value Ref Range Status   07/29/2022 1 33 (H) 0 60 - 1 30 mg/dL Final     Comment:     Standardized to IDMS reference method   07/28/2022 1 08 0 60 - 1 30 mg/dL Final     Comment:     Standardized to IDMS reference method   07/27/2022 1 06 0 60 - 1 30 mg/dL Final     Comment:     Standardized to IDMS reference method   07/19/2015 0 71 0 60 - 1 30 mg/dL Final     Comment:     Standardized to IDMS reference method   07/08/2015 0 59 (L) 0 60 - 1 30 mg/dL Final     Comment:     Standardized to IDMS reference method   03/28/2015 0 44 (L) 0 60 - 1 30 mg/dL Final     Comment:     Standardized to IDMS reference method     Vancomycin Rm   Date Value Ref Range Status   11/04/2021 14 7 ug/mL Final     /65   Pulse 77   Temp 97 6 °F (36 4 °C)   Resp 17   Wt 77 5 kg (170 lb 12 8 oz)   SpO2 98%   BMI 25 22 kg/m²   I/O last 3 completed shifts: In: 240 [P O :240]  Out: 150 [Urine:150]  Lab Results   Component Value Date/Time    BUN 47 (H) 07/29/2022 04:47 AM    BUN 16 07/19/2015 03:40 AM    WBC 17 26 (H) 07/29/2022 04:47 AM    WBC 10 66 (H) 07/19/2015 03:40 AM    HGB 8 2 (L) 07/29/2022 04:47 AM    HGB 13 3 07/19/2015 03:40 AM    HCT 27 5 (L) 07/29/2022 04:47 AM    HCT 40 2 07/19/2015 03:40 AM    MCV 77 (L) 07/29/2022 04:47 AM    MCV 84 07/19/2015 03:40 AM     (H) 07/29/2022 04:47 AM     07/19/2015 03:40 AM     Temp Readings from Last 3 Encounters:   07/29/22 97 6 °F (36 4 °C)   07/19/22 98 °F (36 7 °C) (Tympanic)   06/29/22 (!) 97 °F (36 1 °C) (Tympanic)     Vancomycin Days of Therapy: 3    Assessment/Plan  The patient is currently on vancomycin utilizing scheduled dosing  Baseline risks associated with therapy include: concomitant nephrotoxic medications    The patient is receiving 750 mg every 12 hours  Her kidney function has declined significantly since yesterday  The most recent vancomycin level was not at steady-state (~ 2 5 hours late) and supratherapeutic based on a goal of 15-20 (appropriate for most indications)   Extrapolated trough estimated to be ~27 7;  therefore, after clinical evaluation dose will be changed to 1250 mg daily as needed for random vancomycin level < 20   Pharmacy will continue to follow closely for s/sx of nephrotoxicity, infusion reactions, and appropriateness of therapy  BMP and CBC will be ordered per protocol  Plan for random level to be drawn at 1230 on 7/30  Pharmacy will continue to follow the patients culture results and clinical progress daily      Lokesh Evans, Pharmacist

## 2022-07-29 NOTE — ASSESSMENT & PLAN NOTE
· Potassium currently stable  · Goal for K >4 given history of VT cardiac arrest  · K 6 1 on 7/20/22, avoid overcorrection

## 2022-07-29 NOTE — ASSESSMENT & PLAN NOTE
· Patient still feels pain along the track of the prior chest tube despite being removed 4 days ago  Reviewed this with pain management team -appreciate their assistance again today, planning to provide a nerve block  Continue muscle relaxants including Valium  I increased her Lyrica as well on 7/27  · Patient showed me an image on her phone (and nurse confirms) of pus coming out of the prior chest tube site  I reached out to ID for a consult, pulm and IR  · STAT CT chest "Moderate partially loculated right pleural effusion  A small right anterior loculated component lies deep to the site of previous pigtail insertion with no fluid collection in the chest wall  No lung abscess  Moderate compressive atelectasis in the right lower lobe due to the effusion  Superimposed pneumonia cannot be excluded in the appropriate clinical setting  Mild interstitial edema  Minimal new inflammatory groundglass opacity in the left upper lobe  · Already on broad spectrum antibiotics, with rise in white count  · Planned for chest tube to be placed in IR today    Per discussion with patient when given the option to be nothing by mouth in order to provide more anesthesia if needed verses to eat and have only local, patient opted to have more anesthesia

## 2022-07-29 NOTE — ASSESSMENT & PLAN NOTE
Lab Results   Component Value Date    HGBA1C 12 7 (H) 05/22/2022     Recent Labs     07/28/22  1128 07/28/22  1523 07/28/22 2056 07/29/22  0724   POCGLU 297* 112 148* 149*     Blood Sugar Average: Last 72 hrs:  · (P) 244 [id number]   · Very poorly controlled based on A1c  · Continue regimen of basal bolus insulin; continue to make small titration adjustments as needed  · ADA diet

## 2022-07-29 NOTE — BRIEF OP NOTE (RAD/CATH)
INTERVENTIONAL RADIOLOGY PROCEDURE NOTE    Date: 7/29/2022    Procedure: Right chest tube    Preoperative diagnosis:   1  Primary spontaneous pneumothorax    2  Pneumothorax    3  Shortness of breath    4  Respiratory Distress    5  Persistent pain despite chest tube removal    6  Leukocytosis         Postoperative diagnosis: Same  Surgeon: Kristal Flores MD     Assistant: None  No qualified resident was available  Blood loss: None    Specimens: Pleural fluid     Findings: Loculated pleural effusion, 12 F chest tube placed  Complications: None immediate      Anesthesia: conscious sedation

## 2022-07-29 NOTE — PROGRESS NOTES
[facility]  Progress Note - Lara Ohtodd 1965, 65 y o  female MRN: [id number]  Unit/Bed#: S -01 Encounter: [id number]  Primary Care Provider: Courtney Dubose MD   Date and time admitted to hospital: 7/19/2022 11:33 AM    * Persistent pain despite chest tube removal  Assessment & Plan  · Patient still feels pain along the track of the prior chest tube despite being removed 4 days ago  Reviewed this with pain management team -appreciate their assistance again today, planning to provide a nerve block  Continue muscle relaxants including Valium  I increased her Lyrica as well on 7/27  · Patient showed me an image on her phone (and nurse confirms) of pus coming out of the prior chest tube site  I reached out to ID for a consult, pulm and IR  · STAT CT chest "Moderate partially loculated right pleural effusion  A small right anterior loculated component lies deep to the site of previous pigtail insertion with no fluid collection in the chest wall  No lung abscess  Moderate compressive atelectasis in the right lower lobe due to the effusion  Superimposed pneumonia cannot be excluded in the appropriate clinical setting  Mild interstitial edema  Minimal new inflammatory groundglass opacity in the left upper lobe  · Already on broad spectrum antibiotics, with rise in white count  · Planned for chest tube to be placed in IR today  Per discussion with patient when given the option to be nothing by mouth in order to provide more anesthesia if needed verses to eat and have only local, patient opted to have more anesthesia    Primary spontaneous pneumothorax-resolved as of 7/25/2022  Assessment & Plan  · Patient noted on imaging to have small pneumothorax at [facility], subsequently transferred to [facility]  · Chest tube placed 7/20, then upsized on 7/22 given no significant change and appearance that it was kinked  · Chest x-ray performed 7/24 -no pneumothorax at that time    Chest tube was subsequently removed      Hyponatremia  Assessment & Plan  · Impacted by hyperglycemia as well and pain, infection  · Check osm studies  · continue fluid restriction that was recommended  Recommend adherence to this and follow-up BMP in a m  Respiratory Distress  Assessment & Plan  · Patient noted to be significantly more dyspneic and tachypneic on the morning of July 26 with minimal exertion of going to the bedside commode  · She had already been aggressively diuresed with IV Bumex  · She also reported increased phlegm/tracheal secretions and procalcitonin was newly noted to be elevated at 0 96, Continue day 4 broad-spectrum empiric antibiotics including IV cefepime and vancomycin based on previous culture showing MRSA in sputum, recent chest tube and frequent hospitalizations  · Continue aggressive respiratory protocol with suctioning  · Spoke with pulmonology, IR and Cardiology  · Updated x-ray with atelectasis    Tracheostomy in place [facility])  Assessment & Plan  · Trach placed in the context of cardiac arrest/prolonged ventilator dependent state November 2021  · Decannulated at [facility] 12/11/21  · Patient requires frequent tracheostomy changes and suctioning  · Per discussion with speech therapy,  video barium swallow was done for sense of food getting stuck   Appropriate for regular diet  · On trach collar O2 with humidification      Type 2 diabetes mellitus with hyperglycemia [facility])  Assessment & Plan  Lab Results   Component Value Date    HGBA1C 12 7 (H) 05/22/2022     Recent Labs     07/28/22  1128 07/28/22  1523 07/28/22  2056 07/29/22  0724   POCGLU 297* 112 148* 149*     Blood Sugar Average: Last 72 hrs:  · (P) 015 [id number]   · Very poorly controlled based on A1c  · Continue regimen of basal bolus insulin; continue to make small titration adjustments as needed  · ADA diet    Acute on chronic HFrEF (heart failure with reduced ejection fraction) (HCC)-resolved as of 7/27/2022  Assessment & Plan  Wt Readings from Last 3 Encounters:   07/29/22 77 5 kg (170 lb 12 8 oz)   07/19/22 78 2 kg (172 lb 6 4 oz)   06/29/22 81 7 kg (180 lb 3 2 oz)   · EF noted to be 50% on last echocardiogram 2/22  · Admitted to [facility] for CHF exac on 7/15 - given 80 mg IV lasix and then placed back on bumex 2 mg PO BID  Developed MODESTO and thus bumex dose reduced - got 2 mg on 7/19, no bumex on on 7/20, 1 mg on 7/21, 2 mg on 7/23, 3 mg on 7/23 (home dose had been bumex 2 mg PO BID)  7/24 AM with increasing SOB, oxygen requiements - given 1 mg IV bumex  · CXR showed mild vascular congestion  Suspected iatrogenic volume overload in the setting of reduced/withheld diuretic dosing and received several additional doses of IV Bumex, subsequently held additional doses of diuretics  · Consulted cardiology, appreciate their input  Resume oral Bumex today 7/29    Acute kidney injury superimposed on chronic kidney disease stage 3 [facility])  Assessment & Plan  Lab Results   Component Value Date    EGFR 44 07/29/2022    EGFR 57 07/28/2022    EGFR 58 07/27/2022    CREATININE 1 33 (H) 07/29/2022    CREATININE 1 08 07/28/2022    CREATININE 1 06 07/27/2022   · Creatinine elevated at 1 64 upon transfer on 7/19/22, with baseline being 0 9-1 0  · Trended down to 0 88, but now rising again to 1 33  · Monitor carefully--check bladder scan/urinary retention protocol to ensure no obstructive pathology  · Hold further doses of losartan  Placed higher parameter on Aldactone  A-fib [facility])  Assessment & Plan  · On anticoagulation with Eliquis   · On amiodarone and Toprol which will be continued    Anemia  Assessment & Plan  · Acute on chronic anemia likely related to illness    No overt severe bleeding noted    Hypokalemia-resolved as of 7/27/2022  Assessment & Plan  · Potassium currently stable  · Goal for K >4 given history of VT cardiac arrest  · K 6 1 on 7/20/22, avoid overcorrection      CAD (coronary artery disease)  Assessment & Plan  · STEMI 10/22/2021 s/p PATRICA mid circumflex OM1  OM2 was ballooned but not stented  · Pulseless VT 10/27/21 - repeat LHC 90% mid circumflex stenosis, but could not be intervened on  · VT 10/28/21 LHC:  found to have apical LAD complete occlusion was felt to be small to be intervened  · Cardiac carrest 1/1/22 - NO cardiac cath at [facility] felt to be hypoxic vs  VT induced  · On beta-blocker, Brilinta and Lipitor      History of Cardiac arrest [facility])  Assessment & Plan  · STEMI 10/22/21 - PATRICA to mid circumflex  · VT arrest 10/26   · Polymorphic VT x 1 shock 10/28/21  · ICD not placed given risks felt to outweigh benefits with cognitive function and risk of infection at that that time  · Cardiac arrest 1/1/22 - likely VT related - went to [facility]  · No ICD given lung infection and on IV ABX x 4 weeks  · Canceled 2 EP appts since that time and thus no ICD in place - still wears LifeVest  · EF seems to have always been 40% or higher    VTE Pharmacologic Prophylaxis: VTE Score: 3 eliquis    Patient Centered Rounds: I performed bedside rounds with nursing staff today  Discussions with Specialists or Other Care Team Provider:  Case discussed with Cardiology, Interventional Radiology, pain management, pulmonology    Education and Discussions with Family / Patient: Patient declined call to   Time Spent for Care: 30 minutes  More than 50% of total time spent on counseling and coordination of care as described above  Current Length of Stay: 10 day(s)  Current Patient Status: Inpatient   Certification Statement: The patient will continue to require additional inpatient hospital stay due to Ongoing IV antibiotics and chest tube  Discharge Plan: Discharge not forseen at this point    Code Status: Level 1 - Full Code    Subjective:   Patient continues to report pain in her anterior chest wall radiating through along the tract of the prior chest tube    However, she does report that the Valium does appear to be helpful in controlling the pain somewhat  She has noted some drainage from the prior chest tube site, nonbloody  Patient initially reported to me that she was not willing to be nothing by mouth all day to have the chest tube placed at 4:00 p m  at which point I spoke with Interventional Radiology and patient was offered the option of having only local sedation and eating but she has chosen to have the option for increased sedation and remaining nothing by mouth due to severe pain during prior procedures  Patient also told me that she did not urinate at all yesterday, could not confirm this with nursing, but she is not reporting sense of inability to urinate    Objective:     Vitals:   Temp (24hrs), Av 2 °F (36 8 °C), Min:97 4 °F (36 3 °C), Max:98 6 °F (37 °C)    Temp:  [97 4 °F (36 3 °C)-98 6 °F (37 °C)] 98 1 °F (36 7 °C)  HR:  [65-77] 70  Resp:  [18] 18  BP: ()/(54-68) 109/66  SpO2:  [95 %-100 %] 95 %  Body mass index is 25 22 kg/m²  Input and Output Summary (last 24 hours):   No intake or output data in the 24 hours ending 22 0957    Physical Exam:   Physical Exam  Vitals reviewed  Constitutional:       General: She is not in acute distress  Appearance: She is not toxic-appearing or diaphoretic  Eyes:      General: No scleral icterus  Right eye: No discharge  Left eye: No discharge  Neck:      Comments: trach  Cardiovascular:      Rate and Rhythm: Normal rate and regular rhythm  Heart sounds: No murmur heard  Pulmonary:      Effort: No respiratory distress  Breath sounds: No stridor  Rales (Bilateral lower extremity rales) present  No wheezing or rhonchi  Chest:      Chest wall: Tenderness present  Abdominal:      General: There is no distension  Palpations: Abdomen is soft  Tenderness: There is no abdominal tenderness  There is no guarding  Musculoskeletal:      Right lower leg: No edema  Left lower leg: No edema     Skin:     General: Skin is warm and dry  Coloration: Skin is pale  Skin is not jaundiced  Findings: No bruising, erythema, lesion or rash  Neurological:      General: No focal deficit present  Mental Status: Mental status is at baseline  Psychiatric:         Mood and Affect: Mood normal          Thought Content:  Thought content normal           Additional Data:     Labs:  Results from last 7 days   Lab Units 07/29/22  0447   WBC Thousand/uL 17 26*   HEMOGLOBIN g/dL 8 2*   HEMATOCRIT % 27 5*   PLATELETS Thousands/uL 505*   NEUTROS PCT % 75   LYMPHS PCT % 11*   MONOS PCT % 9   EOS PCT % 3     Results from last 7 days   Lab Units 07/29/22  0447   SODIUM mmol/L 131*   POTASSIUM mmol/L 4 3   CHLORIDE mmol/L 96   CO2 mmol/L 26   BUN mg/dL 47*   CREATININE mg/dL 1 33*   ANION GAP mmol/L 9   CALCIUM mg/dL 8 5   ALBUMIN g/dL 2 6*   TOTAL BILIRUBIN mg/dL 0 55   ALK PHOS U/L 91   ALT U/L 6*   AST U/L 9*   GLUCOSE RANDOM mg/dL 99         Results from last 7 days   Lab Units 07/29/22  0724 07/28/22  2056 07/28/22  1523 07/28/22  1128 07/28/22  0716 07/27/22  2134 07/27/22  1557 07/27/22  1131 07/27/22  0704 07/26/22  2109 07/26/22  1549 07/26/22  1143   POC GLUCOSE mg/dl 149* 148* 112 297* 198* 278* 221* 292* 185* 214* 181* 223*         Results from last 7 days   Lab Units 07/29/22  0447 07/27/22  0619 07/26/22  0507   PROCALCITONIN ng/ml 0 77* 0 68* 0 96*       Lines/Drains:  Invasive Devices  Report    Peripheral Intravenous Line  Duration           Peripheral IV 07/28/22 Right Antecubital 1 day          Airway  Duration           Surgical Airway Shiley Fenestrated 149 days                  Telemetry:  Telemetry Orders (From admission, onward)             LifeVest Patient: Continuous Telemetry Monitoring during hospitalization (non-expiring)  Continuous LifeVest Telemetry Monitoring        References:    LifeVest Policy                 Telemetry Reviewed: unremarkable  Indication for Continued Telemetry Use: Lifevest (remains on tele entire hospital stay)             Imaging:     Recent Cultures (last 7 days):   Results from last 7 days   Lab Units 07/28/22  1201 07/26/22  1349   SPUTUM CULTURE   --  2+ Growth of Staphylococcus aureus*  1+ Growth of Pseudomonas aeruginosa*  1+ Growth of    GRAM STAIN RESULT  3+ Polys*  2+ Gram positive cocci in pairs* 1+ Epithelial cells per low power field*  3+ Polys*  1+ Gram positive cocci in pairs*  Rare Gram positive rods*       Last 24 Hours Medication List:   Current Facility-Administered Medications   Medication Dose Route Frequency Provider Last Rate    acetaminophen  975 mg Oral Q8H [address] Leda Chand PA-C      albuterol  2 5 mg Nebulization Q4H PRN Leda Chand PA-C      amiodarone  100 mg Oral Daily With Breakfast Leda Chand PA-C      apixaban  5 mg Oral BID Leda Chand PA-C      atorvastatin  40 mg Oral Daily With [employer] InstrumentsDONALD      benzonatate  100 mg Oral TID PRN Alpiniano Corinna Goodell, MD      bumetanide  2 mg Oral BID [facility] NEELAM Wright      cefepime  2,000 mg Intravenous Q12H Megan Aldea, DO 2,000 mg (07/28/22 2020)    diazepam  5 mg Oral Q6H PRN Etta Nelson PA-C      escitalopram  10 mg Oral Daily Leda Chand PA-C      fentanyl citrate (PF)   Intravenous Code/Trauma/Sedation Barrington Mccoy MD      ferrous sulfate  325 mg Oral Daily With Breakfast Leda Chand PA-C      guaiFENesin  1,200 mg Oral Q12H [address] Leda Chand PA-C      HYDROmorphone  0 5 mg Intravenous Q2H PRN Jagdish Morejon MD      HYDROmorphone  2 mg Oral Q4H PRN Ricarda Macias PA-C      HYDROmorphone  4 mg Oral Q4H PRN Etta Nelson PA-C      insulin glargine  38 Units Subcutaneous HS Leda Chand PA-C      insulin lispro  19 Units Subcutaneous TID With Meals Leda Chand PA-C      insulin lispro  2-12 Units Subcutaneous TID AC Leda Chand PA-C      insulin lispro  2-12 Units Subcutaneous HS Leda Chand PA-C      ipratropium  0 5 mg Nebulization TID Elly Moe PA-C      levalbuterol  1 25 mg Nebulization TID Elly Moe, DONALD      lidocaine  2 patch Topical Daily Leda Chand, DONALD      metoprolol succinate  50 mg Oral Q12H Mitchell Khai Wright, NEELAM      pantoprazole  40 mg Oral Early Morning Leda Chand, DONALD      pregabalin  25 mg Oral QPM Leda Chand, DONALD      pregabalin  75 mg Oral Daily Leda Chand, DONALD      spironolactone  100 mg Oral Daily Leda Chand, DONALD      ticagrelor  90 mg Oral Q12H Leda Chand, DONALD      trimethobenzamide  200 mg Intramuscular Q6H PRN Marta Adame, DONALD      vancomycin  10 mg/kg Intravenous Q12H Leda Chand, PA-C 750 mg (07/28/22 2123)        Today, Patient Was Seen By: Elly Moe PA-C    **Please Note: This note may have been constructed using a voice recognition system  **

## 2022-07-29 NOTE — ASSESSMENT & PLAN NOTE
· Patient noted on imaging to have small pneumothorax at [facility], subsequently transferred to [facility]  · Chest tube placed 7/20, then upsized on 7/22 given no significant change and appearance that it was kinked  · Chest x-ray performed 7/24 -no pneumothorax at that time    Chest tube was subsequently removed

## 2022-07-29 NOTE — ASSESSMENT & PLAN NOTE
· Patient noted to be significantly more dyspneic and tachypneic on the morning of July 26 with minimal exertion of going to the bedside commode  · She had already been aggressively diuresed with IV Bumex  · She also reported increased phlegm/tracheal secretions and procalcitonin was newly noted to be elevated at 0 96, Continue day 4 broad-spectrum empiric antibiotics including IV cefepime and vancomycin based on previous culture showing MRSA in sputum, recent chest tube and frequent hospitalizations  · Continue aggressive respiratory protocol with suctioning  · Spoke with pulmonology, IR and Cardiology  · Updated x-ray with atelectasis

## 2022-07-29 NOTE — PROGRESS NOTES
Progress Note - Acute Pain Service    Brittanie Mcgowan 65 y o  female MRN: [id number]  Unit/Bed#: S -01 Encounter: [id number]      Assessment:   Principal Problem:    Persistent pain despite chest tube removal  Active Problems:    Type 2 diabetes mellitus with hyperglycemia (HCC)    A-fib (HCC)    History of Cardiac arrest (HCC)    CAD (coronary artery disease)    Tracheostomy in place ([facility] [address] )    Anemia    Hyponatremia    Acute kidney injury superimposed on chronic kidney disease stage 3 ([facility] [address] )    Respiratory Distress    Kelli Lucas is a 65 y o  female with PMHx of VT arrest leading to prolonged intubation/ICU stay (11/2021)  Subsequent subglottic stenosis requiring trach, DM type 2, CKD stage 3, who had a right PTX s/p chest tube placement on 7/20  She had a right ES plane block with exparel on 7/23 with limited analgesic benefit (6-8 hours)  The chest tube was subsequently removed on 7/24 but her hospital course has been c/b persistent right chest wall pain and drainage from chest tube removal site  CT revealed right-sided loculated pleural effusion  She is scheduled for an IR-guided chest tube placement to drain pleural effusion later this afternoon  A right-sided T2 NOELLE catheter was placed in anticipation to treat chest-tube pain once CT is placed later this afternoon  Upon bedside evaluation, Joel Gallegos was anxious but awake and oriented, communicating through trach collar  She reports persistent right-sided chest wall pain with radiation to the back  PO dilaudid and valium alleviate the pain temporarily  Denies opioid-induced side effects including nausea/vomiting/itching/constipation  Unable to use voltaren or lidoderm around chest tube site as there is a bandage       Plan:   - Placed R T2 NOELLE catheter in anticipation for chest-tube induced pain once it's placed later this afternoon; start 0 2% ropivacaine @12/5/10/3  - Continue other MMA: PO tylenol 975mg q8, Lyrica 75mg/25mg BID, PO valium 5mg QID PRN, PO dilaudid 2/4mg q4hr PRN for moderate-severe pain, IV dilauidid 0 5mg q2hr PRN for breakthrough    Bowel Regimen:  - Recommend colace BID and senna qd as bowel regimen while on narcotics    APS will continue to follow  Please contact Acute Pain Service - [facility] via [employer] from [phone] with additional questions or concerns  See TigerText or Shira for additional contacts and after hours information  Pain History  Current pain location(s): Right anterior chest wall with radiation to back  Pain Scale:   0-6/10  Quality: Sharp, radiating  24 hour history: See above    Opioid requirement previous 24 hours: PO dilaudid 4mg  IV dilaudid 0 5mg    Meds/Allergies   all current active meds have been reviewed    Allergies   Allergen Reactions    Metformin Diarrhea    Clonidine Rash     Patch        Objective     Temp:  [97 6 °F (36 4 °C)-98 6 °F (37 °C)] 97 6 °F (36 4 °C)  HR:  [65-80] 80  Resp:  [18] 18  BP: ()/(54-79) 126/79  FiO2 (%):  [28] 28    Physical Exam  HENT:      Head: Normocephalic  Mouth/Throat:      Mouth: Mucous membranes are dry  Eyes:      Pupils: Pupils are equal, round, and reactive to light  Neck:      Comments: Tracheostomy present, on trach collar  Cardiovascular:      Rate and Rhythm: Normal rate  Pulses: Normal pulses  Pulmonary:      Effort: Pulmonary effort is normal       Breath sounds: Rhonchi present  Chest:      Chest wall: Tenderness present  Abdominal:      Palpations: Abdomen is soft  Musculoskeletal:         General: Normal range of motion  Skin:     General: Skin is dry  Comments: Right chest tube site draining with yellow pus     Neurological:      General: No focal deficit present  Mental Status: She is alert and oriented to person, place, and time     Psychiatric:      Comments: Anxious         Lab Results:   Results from last 7 days   Lab Units 07/29/22  0447   WBC Thousand/uL 17 26*   HEMOGLOBIN g/dL 8 2*   HEMATOCRIT % 27 5* PLATELETS Thousands/uL 505*      Results from last 7 days   Lab Units 07/29/22  0447   POTASSIUM mmol/L 4 3   CHLORIDE mmol/L 96   CO2 mmol/L 26   BUN mg/dL 47*   CREATININE mg/dL 1 33*   CALCIUM mg/dL 8 5   ALK PHOS U/L 91   ALT U/L 6*   AST U/L 9*       Imaging Studies: I have personally reviewed pertinent reports  EKG, Pathology, and Other Studies: I have personally reviewed pertinent reports  Counseling / Coordination of Care  Total floor / unit time spent today 20 minutes  Greater than 50% of total time was spent with the patient and / or family counseling and / or coordination of care  Please note that the APS provides consultative services regarding pain management only  With the exception of ketamine and epidural infusions and except when indicated, final decisions regarding starting or changing doses of analgesic medications are at the discretion of the consulting service  Off hours consultation and/or medication management is generally not available      Jovany Meeks MD  Acute Pain Service

## 2022-07-29 NOTE — ASSESSMENT & PLAN NOTE
Wt Readings from Last 3 Encounters:   07/29/22 77 5 kg (170 lb 12 8 oz)   07/19/22 78 2 kg (172 lb 6 4 oz)   06/29/22 81 7 kg (180 lb 3 2 oz)   · EF noted to be 50% on last echocardiogram 2/22  · Admitted to [facility] for CHF exac on 7/15 - given 80 mg IV lasix and then placed back on bumex 2 mg PO BID  Developed MODESTO and thus bumex dose reduced - got 2 mg on 7/19, no bumex on on 7/20, 1 mg on 7/21, 2 mg on 7/23, 3 mg on 7/23 (home dose had been bumex 2 mg PO BID)  7/24 AM with increasing SOB, oxygen requiements - given 1 mg IV bumex  · CXR showed mild vascular congestion  Suspected iatrogenic volume overload in the setting of reduced/withheld diuretic dosing and received several additional doses of IV Bumex, subsequently held additional doses of diuretics  · Consulted cardiology, appreciate their input    Resume oral Bumex today 7/29

## 2022-07-29 NOTE — ASSESSMENT & PLAN NOTE
· Trach placed in the context of cardiac arrest/prolonged ventilator dependent state November 2021  · Decannulated at [facility] 12/11/21  · Patient requires frequent tracheostomy changes and suctioning  · Per discussion with speech therapy,  video barium swallow was done for sense of food getting stuck   Appropriate for regular diet  · On trach collar O2 with humidification

## 2022-07-30 PROBLEM — J86.9 EMPYEMA LUNG (HCC): Status: ACTIVE | Noted: 2022-07-30

## 2022-07-30 PROBLEM — I50.33 ACUTE ON CHRONIC HEART FAILURE WITH PRESERVED EJECTION FRACTION (HCC): Status: ACTIVE | Noted: 2022-07-30

## 2022-07-30 PROBLEM — T14.8XXA WOUND INFECTION: Status: ACTIVE | Noted: 2022-07-30

## 2022-07-30 PROBLEM — L08.9 WOUND INFECTION: Status: ACTIVE | Noted: 2022-07-30

## 2022-07-30 LAB
ANION GAP SERPL CALCULATED.3IONS-SCNC: 9 MMOL/L (ref 4–13)
BACTERIA WND AEROBE CULT: ABNORMAL
BASOPHILS # BLD AUTO: 0.03 THOUSANDS/ΜL (ref 0–0.1)
BASOPHILS NFR BLD AUTO: 0 % (ref 0–1)
BUN SERPL-MCNC: 50 MG/DL (ref 5–25)
CALCIUM SERPL-MCNC: 8.1 MG/DL (ref 8.4–10.2)
CHLORIDE SERPL-SCNC: 98 MMOL/L (ref 96–108)
CO2 SERPL-SCNC: 24 MMOL/L (ref 21–32)
CREAT SERPL-MCNC: 1.19 MG/DL (ref 0.6–1.3)
EOSINOPHIL # BLD AUTO: 0.01 THOUSAND/ΜL (ref 0–0.61)
EOSINOPHIL NFR BLD AUTO: 0 % (ref 0–6)
ERYTHROCYTE [DISTWIDTH] IN BLOOD BY AUTOMATED COUNT: 17.5 % (ref 11.6–15.1)
GFR SERPL CREATININE-BSD FRML MDRD: 51 ML/MIN/1.73SQ M
GLUCOSE SERPL-MCNC: 184 MG/DL (ref 65–140)
GLUCOSE SERPL-MCNC: 215 MG/DL (ref 65–140)
GLUCOSE SERPL-MCNC: 248 MG/DL (ref 65–140)
GLUCOSE SERPL-MCNC: 271 MG/DL (ref 65–140)
GLUCOSE SERPL-MCNC: 317 MG/DL (ref 65–140)
GRAM STN SPEC: ABNORMAL
GRAM STN SPEC: ABNORMAL
HCT VFR BLD AUTO: 25 % (ref 34.8–46.1)
HGB BLD-MCNC: 7.6 G/DL (ref 11.5–15.4)
IMM GRANULOCYTES # BLD AUTO: 0.36 THOUSAND/UL (ref 0–0.2)
IMM GRANULOCYTES NFR BLD AUTO: 2 % (ref 0–2)
LYMPHOCYTES # BLD AUTO: 0.91 THOUSANDS/ΜL (ref 0.6–4.47)
LYMPHOCYTES NFR BLD AUTO: 5 % (ref 14–44)
MCH RBC QN AUTO: 22.8 PG (ref 26.8–34.3)
MCHC RBC AUTO-ENTMCNC: 30.4 G/DL (ref 31.4–37.4)
MCV RBC AUTO: 75 FL (ref 82–98)
MONOCYTES # BLD AUTO: 1.35 THOUSAND/ΜL (ref 0.17–1.22)
MONOCYTES NFR BLD AUTO: 8 % (ref 4–12)
NEUTROPHILS # BLD AUTO: 15.42 THOUSANDS/ΜL (ref 1.85–7.62)
NEUTS SEG NFR BLD AUTO: 85 % (ref 43–75)
NRBC BLD AUTO-RTO: 0 /100 WBCS
OSMOLALITY UR/SERPL-RTO: 298 MMOL/KG (ref 282–298)
PLATELET # BLD AUTO: 583 THOUSANDS/UL (ref 149–390)
PMV BLD AUTO: 9.5 FL (ref 8.9–12.7)
POTASSIUM SERPL-SCNC: 4.4 MMOL/L (ref 3.5–5.3)
RBC # BLD AUTO: 3.33 MILLION/UL (ref 3.81–5.12)
SODIUM SERPL-SCNC: 131 MMOL/L (ref 135–147)
VANCOMYCIN SERPL-MCNC: 13.6 UG/ML (ref 10–20)
WBC # BLD AUTO: 18.08 THOUSAND/UL (ref 4.31–10.16)

## 2022-07-30 PROCEDURE — 99232 SBSQ HOSP IP/OBS MODERATE 35: CPT | Performed by: INTERNAL MEDICINE

## 2022-07-30 PROCEDURE — 80048 BASIC METABOLIC PNL TOTAL CA: CPT | Performed by: PHYSICIAN ASSISTANT

## 2022-07-30 PROCEDURE — 94640 AIRWAY INHALATION TREATMENT: CPT

## 2022-07-30 PROCEDURE — 85025 COMPLETE CBC W/AUTO DIFF WBC: CPT | Performed by: PHYSICIAN ASSISTANT

## 2022-07-30 PROCEDURE — 80202 ASSAY OF VANCOMYCIN: CPT | Performed by: PHYSICIAN ASSISTANT

## 2022-07-30 PROCEDURE — 99233 SBSQ HOSP IP/OBS HIGH 50: CPT | Performed by: INTERNAL MEDICINE

## 2022-07-30 PROCEDURE — 94760 N-INVAS EAR/PLS OXIMETRY 1: CPT

## 2022-07-30 PROCEDURE — 82948 REAGENT STRIP/BLOOD GLUCOSE: CPT

## 2022-07-30 PROCEDURE — 83930 ASSAY OF BLOOD OSMOLALITY: CPT | Performed by: PHYSICIAN ASSISTANT

## 2022-07-30 RX ORDER — POLYETHYLENE GLYCOL 3350 17 G/17G
17 POWDER, FOR SOLUTION ORAL DAILY PRN
Status: DISCONTINUED | OUTPATIENT
Start: 2022-07-30 | End: 2022-08-01

## 2022-07-30 RX ORDER — INSULIN GLARGINE 100 [IU]/ML
45 INJECTION, SOLUTION SUBCUTANEOUS
Status: DISCONTINUED | OUTPATIENT
Start: 2022-07-30 | End: 2022-08-02 | Stop reason: HOSPADM

## 2022-07-30 RX ORDER — INSULIN LISPRO 100 [IU]/ML
22 INJECTION, SOLUTION INTRAVENOUS; SUBCUTANEOUS
Status: DISCONTINUED | OUTPATIENT
Start: 2022-07-30 | End: 2022-08-02 | Stop reason: HOSPADM

## 2022-07-30 RX ORDER — AMOXICILLIN 250 MG
1 CAPSULE ORAL 2 TIMES DAILY
Status: DISCONTINUED | OUTPATIENT
Start: 2022-07-30 | End: 2022-08-02 | Stop reason: HOSPADM

## 2022-07-30 RX ADMIN — TICAGRELOR 90 MG: 90 TABLET ORAL at 18:35

## 2022-07-30 RX ADMIN — INSULIN LISPRO 4 UNITS: 100 INJECTION, SOLUTION INTRAVENOUS; SUBCUTANEOUS at 21:44

## 2022-07-30 RX ADMIN — SPIRONOLACTONE 100 MG: 100 TABLET ORAL at 08:51

## 2022-07-30 RX ADMIN — INSULIN LISPRO 2 UNITS: 100 INJECTION, SOLUTION INTRAVENOUS; SUBCUTANEOUS at 18:35

## 2022-07-30 RX ADMIN — ALTEPLASE 10 MG: 2.2 INJECTION, POWDER, LYOPHILIZED, FOR SOLUTION INTRAVENOUS at 11:22

## 2022-07-30 RX ADMIN — ROPIVACAINE HYDROCHLORIDE: 2 INJECTION, SOLUTION EPIDURAL; INFILTRATION at 11:23

## 2022-07-30 RX ADMIN — PREGABALIN 75 MG: 75 CAPSULE ORAL at 08:49

## 2022-07-30 RX ADMIN — BUMETANIDE 2 MG: 1 TABLET ORAL at 08:50

## 2022-07-30 RX ADMIN — HYDROMORPHONE HYDROCHLORIDE 0.5 MG: 1 INJECTION, SOLUTION INTRAMUSCULAR; INTRAVENOUS; SUBCUTANEOUS at 11:31

## 2022-07-30 RX ADMIN — TRIMETHOBENZAMIDE HYDROCHLORIDE 200 MG: 100 INJECTION INTRAMUSCULAR at 20:11

## 2022-07-30 RX ADMIN — GUAIFENESIN 1200 MG: 600 TABLET ORAL at 21:42

## 2022-07-30 RX ADMIN — APIXABAN 5 MG: 5 TABLET, FILM COATED ORAL at 08:50

## 2022-07-30 RX ADMIN — DORNASE ALFA 5 MG: 1 SOLUTION RESPIRATORY (INHALATION) at 11:22

## 2022-07-30 RX ADMIN — ESCITALOPRAM OXALATE 10 MG: 10 TABLET ORAL at 08:50

## 2022-07-30 RX ADMIN — TICAGRELOR 90 MG: 90 TABLET ORAL at 05:20

## 2022-07-30 RX ADMIN — Medication: at 06:41

## 2022-07-30 RX ADMIN — HYDROMORPHONE HYDROCHLORIDE 2 MG: 2 TABLET ORAL at 09:35

## 2022-07-30 RX ADMIN — METOPROLOL SUCCINATE 50 MG: 50 TABLET, EXTENDED RELEASE ORAL at 05:20

## 2022-07-30 RX ADMIN — INSULIN LISPRO 19 UNITS: 100 INJECTION, SOLUTION INTRAVENOUS; SUBCUTANEOUS at 13:52

## 2022-07-30 RX ADMIN — CEFEPIME HYDROCHLORIDE 2000 MG: 2 INJECTION, SOLUTION INTRAVENOUS at 21:50

## 2022-07-30 RX ADMIN — LEVALBUTEROL HYDROCHLORIDE 1.25 MG: 1.25 SOLUTION, CONCENTRATE RESPIRATORY (INHALATION) at 07:07

## 2022-07-30 RX ADMIN — INSULIN LISPRO 19 UNITS: 100 INJECTION, SOLUTION INTRAVENOUS; SUBCUTANEOUS at 08:48

## 2022-07-30 RX ADMIN — IPRATROPIUM BROMIDE 0.5 MG: 0.5 SOLUTION RESPIRATORY (INHALATION) at 13:35

## 2022-07-30 RX ADMIN — HYDROMORPHONE HYDROCHLORIDE 0.5 MG: 1 INJECTION, SOLUTION INTRAMUSCULAR; INTRAVENOUS; SUBCUTANEOUS at 20:33

## 2022-07-30 RX ADMIN — APIXABAN 5 MG: 5 TABLET, FILM COATED ORAL at 18:35

## 2022-07-30 RX ADMIN — SENNOSIDES AND DOCUSATE SODIUM 1 TABLET: 50; 8.6 TABLET ORAL at 15:24

## 2022-07-30 RX ADMIN — DESMOPRESSIN ACETATE 40 MG: 0.2 TABLET ORAL at 17:12

## 2022-07-30 RX ADMIN — PREGABALIN 25 MG: 25 CAPSULE ORAL at 18:35

## 2022-07-30 RX ADMIN — IPRATROPIUM BROMIDE 0.5 MG: 0.5 SOLUTION RESPIRATORY (INHALATION) at 20:40

## 2022-07-30 RX ADMIN — AMIODARONE HYDROCHLORIDE 100 MG: 200 TABLET ORAL at 08:50

## 2022-07-30 RX ADMIN — HYDROMORPHONE HYDROCHLORIDE 4 MG: 2 TABLET ORAL at 18:50

## 2022-07-30 RX ADMIN — DIAZEPAM 5 MG: 5 TABLET ORAL at 22:11

## 2022-07-30 RX ADMIN — IPRATROPIUM BROMIDE 0.5 MG: 0.5 SOLUTION RESPIRATORY (INHALATION) at 07:07

## 2022-07-30 RX ADMIN — CEFEPIME HYDROCHLORIDE 2000 MG: 2 INJECTION, SOLUTION INTRAVENOUS at 09:35

## 2022-07-30 RX ADMIN — VANCOMYCIN HYDROCHLORIDE 1250 MG: 1 INJECTION, POWDER, LYOPHILIZED, FOR SOLUTION INTRAVENOUS at 15:25

## 2022-07-30 RX ADMIN — METOPROLOL SUCCINATE 50 MG: 50 TABLET, EXTENDED RELEASE ORAL at 18:37

## 2022-07-30 RX ADMIN — LEVALBUTEROL HYDROCHLORIDE 1.25 MG: 1.25 SOLUTION, CONCENTRATE RESPIRATORY (INHALATION) at 13:35

## 2022-07-30 RX ADMIN — BUMETANIDE 2 MG: 1 TABLET ORAL at 18:35

## 2022-07-30 RX ADMIN — GUAIFENESIN 1200 MG: 600 TABLET ORAL at 08:50

## 2022-07-30 RX ADMIN — INSULIN LISPRO 4 UNITS: 100 INJECTION, SOLUTION INTRAVENOUS; SUBCUTANEOUS at 13:52

## 2022-07-30 RX ADMIN — ROPIVACAINE HYDROCHLORIDE: 2 INJECTION, SOLUTION EPIDURAL; INFILTRATION at 23:14

## 2022-07-30 RX ADMIN — Medication: at 00:44

## 2022-07-30 RX ADMIN — INSULIN LISPRO 8 UNITS: 100 INJECTION, SOLUTION INTRAVENOUS; SUBCUTANEOUS at 08:47

## 2022-07-30 RX ADMIN — PANTOPRAZOLE SODIUM 40 MG: 40 TABLET, DELAYED RELEASE ORAL at 05:21

## 2022-07-30 RX ADMIN — ROPIVACAINE HYDROCHLORIDE: 2 INJECTION, SOLUTION EPIDURAL; INFILTRATION at 17:10

## 2022-07-30 RX ADMIN — LEVALBUTEROL HYDROCHLORIDE 1.25 MG: 1.25 SOLUTION, CONCENTRATE RESPIRATORY (INHALATION) at 20:40

## 2022-07-30 NOTE — ASSESSMENT & PLAN NOTE
· Purulent discharge noted at site of recent chest tube placement  · Cultures positive for MRSA  · Currently on IV vancomycin  · Continue local wound care/dressing changes

## 2022-07-30 NOTE — PROGRESS NOTES
[facility]  Progress Note - Brittanie Meo 1965, 65 y o  female MRN: [id number]  Unit/Bed#: S -01 Encounter: [id number]  Primary Care Provider: Shyann Sierra MD   Date and time admitted to hospital: 7/19/2022 11:33 AM    * Empyema lung, Right  Assessment & Plan  · Loculated right pleural effusion  She is status post spontaneous right-sided pneumothorax with chest tube placement and subsequent removal  · Most recent CT of the chest on 7/28/22 revealed moderate partially loculated right pleural effusion  · Right chest tube placed on 7/29/22 by IR with approximately 50 mL output in the past 24 hours  · Pleural fluid analysis shows neutrophil predominant WBC count  · Fluid cultures from prior anterior chest tube site positive for MRSA  · Currently on IV cefepime and vancomycin  ID following  Appreciate recommendations  · Chest tube management per Pulmonary/IR    Persistent pain despite chest tube removal  Assessment & Plan  · Continues with intractable pain at prior chest tube site on right anterior chest wall  Chest tube was taken out on 7/24/22  · Currently followed by acute pain service  She is status post ES plane block on 7/23/22 with limited analgesic effect lasting approximately 6-8 hours  · Currently on multimodal pain regimen with scheduled Tylenol, Lyrica, p r n  Valium, IV and p o  Dilaudid  · Added bowel regimen with senna/Colace b i d  And p r n  MiraLax  · APS following  Appreciate input    Acute Respiratory insufficiency on chronic hypoxic respiratory failure  Assessment & Plan  · She is status post trach  Currently on 6 L with sats in the high 90s  · Status post recent right pneumothorax requiring chest tube and now with right empyema  · Continue aggressive respiratory protocol, p r n   Suctioning  · Encourage out of bed, incentive spirometry  · Pulmonology following    Chest wall wound infection  Assessment & Plan  · Purulent discharge noted at site of recent chest tube placement  · Cultures positive for MRSA  · Currently on IV vancomycin  · Continue local wound care/dressing changes    Acute on chronic heart failure with preserved ejection fraction [facility])  Assessment & Plan  Wt Readings from Last 3 Encounters:   07/30/22 78 7 kg (173 lb 6 4 oz)   07/19/22 78 2 kg (172 lb 6 4 oz)   06/29/22 81 7 kg (180 lb 3 2 oz)     · Most recent EF 50%  Currently stable and euvolemic  · On Bumex 2 mg twice daily with Aldactone 100 mg daily    Will continue  · Continue low-salt diet, monitor I's and O's  · She is status post Cardiology consultation this admission  · Will follow outpatient with Cardiology    Type 2 diabetes mellitus with hyperglycemia [facility])  Assessment & Plan  Lab Results   Component Value Date    HGBA1C 12 7 (H) 05/22/2022     Recent Labs     07/29/22  1541 07/29/22  2051 07/30/22  0747 07/30/22  1209   POCGLU 249* 336* 317* 215*     Blood Sugar Average: Last 72 hrs:  · (P) 227 [id number]   · Very poorly controlled based on A1c  · Will increase basal/bolus insulin to 45 units HS and 22 units with meals today  · ADA diet, Accu-Cheks a c  HS    History of Cardiac arrest (HCC)  Assessment & Plan  · STEMI 10/22/21 - PATRICA to mid circumflex  · VT arrest 10/26   · Polymorphic VT x 1 shock 10/28/21  · ICD not placed given risks felt to outweigh benefits with cognitive function and risk of infection at that that time  · Cardiac arrest 1/1/22 - likely VT related - went to [facility] - [facility]  · No ICD given lung infection and on IV ABX x 4 weeks  · Canceled 2 EP appts since that time and thus no ICD in place - still wears LifeVest  · Most recent EF improved to 50%    A-fib [facility])  Assessment & Plan  · On anticoagulation with Eliquis   · Continue amiodarone, Toprol    Acute kidney injury superimposed on chronic kidney disease stage 3 [facility])  Assessment & Plan  Lab Results   Component Value Date    EGFR 51 07/30/2022    EGFR 44 07/29/2022    EGFR 57 07/28/2022    CREATININE 1 19 2022    CREATININE 1 33 (H) 2022    CREATININE 1 08 2022   · Creatinine elevated at 1 64 upon transfer on 22, with baseline being 0 9-1 0  · Currently stable at 1 08 today  · Continue to monitor closely  Losartan on hold    Hyponatremia  Assessment & Plan  · Corrected sodium for glucose is 135  · No additional workup at this time      VTE Pharmacologic Prophylaxis:   Pharmacologic: Apixaban (Eliquis)  Mechanical VTE Prophylaxis in Place: Yes    Patient Centered Rounds: I have performed bedside rounds with nursing staff today  Discussions with Specialists or Other Care Team Provider:  Nursing    Education and Discussions with Family / Patient:  Patient/declines call to     Current Length of Stay: 11 day(s)    Current Patient Status: Inpatient   Certification Statement: The patient will continue to require additional inpatient hospital stay due to Above diagnosis and care plan    Discharge Plan:  Pending clinical improvement    Code Status: Level 1 - Full Code      Subjective:   Continues to report anterior chest wall pain  She is now status post right chest tube placement posteriorly for loculated effusion  She denies any fever or shortness of breath  She is tolerating p o  And reports last bowel movements was approximately 3 days ago  Objective:     Vitals:   Temp (24hrs), Av 1 °F (36 7 °C), Min:97 8 °F (36 6 °C), Max:98 3 °F (36 8 °C)    Temp:  [97 8 °F (36 6 °C)-98 3 °F (36 8 °C)] 98 2 °F (36 8 °C)  HR:  [51-89] 58  Resp:  [18] 18  BP: (102-137)/() 119/68  SpO2:  [94 %-100 %] 99 %  Body mass index is 25 61 kg/m²  Input and Output Summary (last 24 hours):        Intake/Output Summary (Last 24 hours) at 2022 1316  Last data filed at 2022 0935  Gross per 24 hour   Intake 1185 05 ml   Output 1098 ml   Net 87 05 ml       Physical Exam:  General Appearance:    Alert, cooperative, no distress, appropriately responsive    Head:    Normocephalic, without obvious abnormality, atraumatic, mucous membranes moist    Neck:   Supple   Resp/chest wall:     Nonlabored, tender right anterior chest wall  Prior chest tube site with minimal mucopurulent discharge on dressing  Breath sounds decreased at the bases, no rales or wheeze    Heart:    Regular rate and rhythm, S1 and S2    Abdomen:     Soft, non-tender, nondistended   Extremities:   No edema   Neurologic:  nonfocal         Additional Data:     Labs:    Results from last 7 days   Lab Units 07/30/22  0459   WBC Thousand/uL 18 08*   HEMOGLOBIN g/dL 7 6*   HEMATOCRIT % 25 0*   PLATELETS Thousands/uL 583*   NEUTROS PCT % 85*   LYMPHS PCT % 5*   MONOS PCT % 8   EOS PCT % 0     Results from last 7 days   Lab Units 07/30/22  0459 07/29/22  0447   POTASSIUM mmol/L 4 4 4 3   CHLORIDE mmol/L 98 96   CO2 mmol/L 24 26   BUN mg/dL 50* 47*   CREATININE mg/dL 1 19 1 33*   CALCIUM mg/dL 8 1* 8 5   ALK PHOS U/L  --  91   ALT U/L  --  6*   AST U/L  --  9*           * I Have Reviewed All Lab Data Listed Above  * Additional Pertinent Lab Tests Reviewed: [facility] 67 Admission Reviewed    Cultures:   Blood Culture:   Lab Results   Component Value Date    BLOODCX No Growth After 5 Days  05/11/2022    BLOODCX No Growth After 5 Days  05/11/2022    BLOODCX No Growth After 5 Days  02/24/2022    BLOODCX No Growth After 5 Days  02/24/2022    BLOODCX No Growth After 5 Days  02/23/2022    BLOODCX No Growth After 5 Days  02/23/2022    BLOODCX No Growth After 5 Days   11/14/2021     Urine Culture:   Lab Results   Component Value Date    URINECX >100,000 cfu/ml Escherichia coli (A) 10/29/2021    URINECX >100,000 cfu/ml Escherichia coli (A) 09/28/2021    URINECX >100,000 cfu/ml Lactobacillus species (A) 09/28/2021    URINECX >100,000 cfu/ml Escherichia coli (A) 02/03/2020    URINECX (A) 03/04/2019     >100,000 cfu/ml Beta Hemolytic Streptococcus Group B    URINECX (A) 03/04/2019     >100,000 cfu/ml Alpha Hemolytic Streptococcus NOT Enterococcus    URINECX (A) 10/07/2018     >100,000 cfu/ml Beta Hemolytic Streptococcus Group B    URINECX (A) 10/07/2018     >100,000 cfu/ml Alpha Hemolytic Streptococcus NOT Enterococcus     Sputum Culture: No components found for: SPUTUMCX  Wound Culture:   Lab Results   Component Value Date    WOUNDCULT (A) 07/28/2022     2+ Growth of Methicillin Resistant Staphylococcus aureus       Last 24 Hours Medication List:   Current Facility-Administered Medications   Medication Dose Route Frequency Provider Last Rate    acetaminophen  975 mg Oral Q8H [facility] Leda Chand PA-C      albuterol  2 5 mg Nebulization Q4H PRN Leda Chand PA-C      amiodarone  100 mg Oral Daily With Breakfast Leda Chand PA-C      apixaban  5 mg Oral BID Leda Chand PA-C      atorvastatin  40 mg Oral Daily With [employer]DONALD      benzonatate  100 mg Oral TID PRN Edel Elmore MD      bumetanide  2 mg Oral BID NEELAM Anand      cefepime  2,000 mg Intravenous Q12H Megan Aldea, DO 2,000 mg (07/30/22 0935)    diazepam  5 mg Oral Q6H PRN Etta Nelson PA-C      escitalopram  10 mg Oral Daily Leda Chand PA-C      ferrous sulfate  325 mg Oral Daily With Breakfast Leda Chand PA-C      guaiFENesin  1,200 mg Oral Q12H [facility] Leda Chand PA-C      HYDROmorphone  0 5 mg Intravenous Q2H PRN Jagdish Thomson MD      HYDROmorphone  2 mg Oral Q4H PRN Christina Palmer PA-C      HYDROmorphone  4 mg Oral Q4H PRN Etta Nelson PA-C      insulin glargine  45 Units Subcutaneous HS Treasure Jovel MD      insulin lispro  2-12 Units Subcutaneous TID AC Leda Chand PA-C      insulin lispro  2-12 Units Subcutaneous HS Leda Chand PA-C      insulin lispro  22 Units Subcutaneous TID With Meals Treasure Jovel MD      ipratropium  0 5 mg Nebulization TID Leda Chand PA-C      levalbuterol  1 25 mg Nebulization TID Leda Chand PA-C      lidocaine  2 patch Topical Daily Leda DONALD Chand      metoprolol succinate  50 mg Oral Q12H Jesus Phoenix Spirocarloso, CRNP      pantoprazole  40 mg Oral Early Morning Leda Chand PA-C      polyethylene glycol  17 g Oral Daily PRN Alejandro Dang MD      pregabalin  25 mg Oral QPM Leda Chand PA-C      pregabalin  75 mg Oral Daily Leda Chand PA-C      ropivacaine 0 2%   Epidural Continuous Bright Zena Bairon Stringer MD      senna-docusate sodium  1 tablet Oral BID Alejandro Dang MD      spironolactone  100 mg Oral Daily Leda Chand PA-C      ticagrelor  90 mg Oral Q12H Leda Chand PA-C      trimethobenzamide  200 mg Intramuscular Q6H PRN Mumtaz Treadwell PA-C      vancomycin  15 mg/kg Intravenous Daily PRN Henry Broussard PA-C          Today, Patient Was Seen By: Alejandro Dang MD    ** Please Note: Dragon 360 Dictation voice to text software may have been used in the creation of this document   **

## 2022-07-30 NOTE — PLAN OF CARE
Problem: Potential for Falls  Goal: Patient will remain free of falls  Description: INTERVENTIONS:  - Educate patient/family on patient safety including physical limitations  - Instruct patient to call for assistance with activity   - Consult OT/PT to assist with strengthening/mobility   - Keep Call bell within reach  - Keep bed low and locked with side rails adjusted as appropriate  - Keep care items and personal belongings within reach  - Initiate and maintain comfort rounds  - Make Fall Risk Sign visible to staff  - Initiate/Maintain bed/chair alarm  - Obtain necessary fall risk management equipment  - Apply yellow socks and bracelet for high fall risk patients  - Consider moving patient to room near nurses station  Outcome: Progressing     Problem: Nutrition/Hydration-ADULT  Goal: Nutrient/Hydration intake appropriate for improving, restoring or maintaining nutritional needs  Description: Monitor and assess patient's nutrition/hydration status for malnutrition  Collaborate with interdisciplinary team and initiate plan and interventions as ordered  Monitor patient's weight and dietary intake as ordered or per policy  Utilize nutrition screening tool and intervene as necessary  Determine patient's food preferences and provide high-protein, high-caloric foods as appropriate       INTERVENTIONS:  - Monitor oral intake, urinary output, labs, and treatment plans  - Assess nutrition and hydration status and recommend course of action  - Evaluate amount of meals eaten  - Assist patient with eating if necessary   - Allow adequate time for meals  - Recommend/ encourage appropriate diets, oral nutritional supplements, and vitamin/mineral supplements  - Order, calculate, and assess calorie counts as needed  - Recommend, monitor, and adjust tube feedings and TPN/PPN based on assessed needs  - Assess need for intravenous fluids  - Provide specific nutrition/hydration education as appropriate  - Include patient/family/caregiver in decisions related to nutrition  Outcome: Progressing     Problem: PAIN - ADULT  Goal: Verbalizes/displays adequate comfort level or baseline comfort level  Description: Interventions:  - Encourage patient to monitor pain and request assistance  - Assess pain using appropriate pain scale  - Administer analgesics based on type and severity of pain and evaluate response  - Implement non-pharmacological measures as appropriate and evaluate response  - Consider cultural and social influences on pain and pain management  - Notify physician/advanced practitioner if interventions unsuccessful or patient reports new pain  Outcome: Progressing     Problem: INFECTION - ADULT  Goal: Absence or prevention of progression during hospitalization  Description: INTERVENTIONS:  - Assess and monitor for signs and symptoms of infection  - Monitor lab/diagnostic results  - Monitor all insertion sites, i e  indwelling lines, tubes, and drains  - Monitor endotracheal if appropriate and nasal secretions for changes in amount and color  - [place] appropriate cooling/warming therapies per order  - Administer medications as ordered  - Instruct and encourage patient and family to use good hand hygiene technique  - Identify and instruct in appropriate isolation precautions for identified infection/condition  Outcome: Progressing  Goal: Absence of fever/infection during neutropenic period  Description: INTERVENTIONS:  - Monitor WBC    Outcome: Progressing     Problem: SAFETY ADULT  Goal: Patient will remain free of falls  Description: INTERVENTIONS:  - Educate patient/family on patient safety including physical limitations  - Instruct patient to call for assistance with activity   - Consult OT/PT to assist with strengthening/mobility   - Keep Call bell within reach  - Keep bed low and locked with side rails adjusted as appropriate  - Keep care items and personal belongings within reach  - Initiate and maintain comfort rounds  - Make Fall Risk Sign visible to staff  - Initiate/Maintain bed/chair alarm  - Obtain necessary fall risk management equipment  - Apply yellow socks and bracelet for high fall risk patients  - Consider moving patient to room near nurses station  Outcome: Progressing  Goal: Maintain or return to baseline ADL function  Description: INTERVENTIONS:  -  Assess patient's ability to carry out ADLs; assess patient's baseline for ADL function and identify physical deficits which impact ability to perform ADLs (bathing, care of mouth/teeth, toileting, grooming, dressing, etc )  - Assess/evaluate cause of self-care deficits   - Assess range of motion  - Assess patient's mobility; develop plan if impaired  - Assess patient's need for assistive devices and provide as appropriate  - Encourage maximum independence but intervene and supervise when necessary  - Involve family in performance of ADLs  - Assess for home care needs following discharge   - Consider OT consult to assist with ADL evaluation and planning for discharge  - Provide patient education as appropriate  Outcome: Progressing  Goal: Maintains/Returns to pre admission functional level  Description: INTERVENTIONS:  - Perform BMAT or MOVE assessment daily    - Set and communicate daily mobility goal to care team and patient/family/caregiver     - Collaborate with rehabilitation services on mobility goals if consulted  - Ambulate patient 2 times a day  - Out of bed for meals 3  times a day  - Out of bed for toileting  - Record patient progress and toleration of activity level   Outcome: Progressing     Problem: DISCHARGE PLANNING  Goal: Discharge to home or other facility with appropriate resources  Description: INTERVENTIONS:  - Identify barriers to discharge w/patient and caregiver  - Arrange for needed discharge resources and transportation as appropriate  - Identify discharge learning needs (meds, wound care, etc )  - Arrange for interpretive services to assist at discharge as needed  - Refer to Case Management Department for coordinating discharge planning if the patient needs post-hospital services based on physician/advanced practitioner order or complex needs related to functional status, cognitive ability, or social support system  Outcome: Progressing     Problem: Knowledge Deficit  Goal: Patient/family/caregiver demonstrates understanding of disease process, treatment plan, medications, and discharge instructions  Description: Complete learning assessment and assess knowledge base  Interventions:  - Provide teaching at level of understanding  - Provide teaching via preferred learning methods  Outcome: Progressing     Problem: MOBILITY - ADULT  Goal: Maintain or return to baseline ADL function  Description: INTERVENTIONS:  -  Assess patient's ability to carry out ADLs; assess patient's baseline for ADL function and identify physical deficits which impact ability to perform ADLs (bathing, care of mouth/teeth, toileting, grooming, dressing, etc )  - Assess/evaluate cause of self-care deficits   - Assess range of motion  - Assess patient's mobility; develop plan if impaired  - Assess patient's need for assistive devices and provide as appropriate  - Encourage maximum independence but intervene and supervise when necessary  - Involve family in performance of ADLs  - Assess for home care needs following discharge   - Consider OT consult to assist with ADL evaluation and planning for discharge  - Provide patient education as appropriate  Outcome: Progressing  Goal: Maintains/Returns to pre admission functional level  Description: INTERVENTIONS:  - Perform BMAT or MOVE assessment daily    - Set and communicate daily mobility goal to care team and patient/family/caregiver     - Collaborate with rehabilitation services on mobility goals if consulted  - Ambulate patient 2 times a day  - Out of bed for meals 3 times a day  - Out of bed for toileting  - Record patient progress and toleration of activity level   Outcome: Progressing     Problem: Prexisting or High Potential for Compromised Skin Integrity  Goal: Skin integrity is maintained or improved  Description: INTERVENTIONS:  - Identify patients at risk for skin breakdown  - Assess and monitor skin integrity  - Assess and monitor nutrition and hydration status  - Monitor labs   - Assess for incontinence   - Turn and reposition patient  - Assist with mobility/ambulation  - Relieve pressure over bony prominences  - Avoid friction and shearing  - Provide appropriate hygiene as needed including keeping skin clean and dry  - Evaluate need for skin moisturizer/barrier cream  - Collaborate with interdisciplinary team   - Patient/family teaching  - Consider wound care consult   Outcome: Progressing

## 2022-07-30 NOTE — ASSESSMENT & PLAN NOTE
· Continues with intractable pain at prior chest tube site on right anterior chest wall  Chest tube was taken out on 7/24/22  · Currently followed by acute pain service  She is status post ES plane block on 7/23/22 with limited analgesic effect lasting approximately 6-8 hours  · Currently on multimodal pain regimen with scheduled Tylenol, Lyrica, p r n  Valium, IV and p o  Dilaudid  · Added bowel regimen with senna/Colace b i d  And p r n  MiraLax  · APS following    Appreciate input

## 2022-07-30 NOTE — ASSESSMENT & PLAN NOTE
Lab Results   Component Value Date    EGFR 51 07/30/2022    EGFR 44 07/29/2022    EGFR 57 07/28/2022    CREATININE 1 19 07/30/2022    CREATININE 1 33 (H) 07/29/2022    CREATININE 1 08 07/28/2022   · Creatinine elevated at 1 64 upon transfer on 7/19/22, with baseline being 0 9-1 0  · Currently stable at 1 08 today  · Continue to monitor closely    Losartan on hold

## 2022-07-30 NOTE — ASSESSMENT & PLAN NOTE
· She is status post trach  Currently on 6 L with sats in the high 90s  · Status post recent right pneumothorax requiring chest tube and now with right empyema  · Continue aggressive respiratory protocol, p r n   Suctioning  · Encourage out of bed, incentive spirometry  · Pulmonology following

## 2022-07-30 NOTE — ASSESSMENT & PLAN NOTE
Lab Results   Component Value Date    HGBA1C 12 7 (H) 05/22/2022     Recent Labs     07/29/22  1541 07/29/22  2051 07/30/22  0747 07/30/22  1209   POCGLU 249* 336* 317* 215*     Blood Sugar Average: Last 72 hrs:  · (P) 227 [id number]   · Very poorly controlled based on A1c  · Will increase basal/bolus insulin to 45 units HS and 22 units with meals today  · ADA diet, Accu-Cheks a c  HS

## 2022-07-30 NOTE — ASSESSMENT & PLAN NOTE
· Loculated right pleural effusion  She is status post spontaneous right-sided pneumothorax with chest tube placement and subsequent removal  · Most recent CT of the chest on 7/28/22 revealed moderate partially loculated right pleural effusion  · Right chest tube placed on 7/29/22 by IR with approximately 50 mL output in the past 24 hours  · Pleural fluid analysis shows neutrophil predominant WBC count  · Fluid cultures from prior anterior chest tube site positive for MRSA  · Currently on IV cefepime and vancomycin  ID following    Appreciate recommendations  · Chest tube management per Pulmonary/IR

## 2022-07-30 NOTE — PROGRESS NOTES
Progress Note - Acute Pain Service    Steff Canela 65 y o  female MRN: [id number]  Unit/Bed#: S -01 Encounter: [id number]      Assessment:   Principal Problem:    Persistent pain despite chest tube removal  Active Problems:    Type 2 diabetes mellitus with hyperglycemia (HCC)    A-fib (HCC)    History of Cardiac arrest (HCC)    CAD (coronary artery disease)    Tracheostomy in place ([facility] [address] )    Anemia    Hyponatremia    Acute kidney injury superimposed on chronic kidney disease stage 3 (HCC)    Respiratory Distress    Kelli Daly is a 65 y o  female with PMHx of VT arrest leading to prolonged intubation/ICU stay (11/2021)  Subsequent subglottic stenosis requiring trach, DM type 2, CKD stage 3, who had a right PTX s/p chest tube placement on 7/20  She had a right ES plane block with exparel on 7/23 with limited analgesic benefit (6-8 hours)  The chest tube was subsequently removed on 7/24 but her hospital course has been c/b persistent right chest wall pain and drainage from chest tube removal site  CT revealed right-sided loculated pleural effusion s/p chest tube placement on 7/29  A right T2 NOELLE catheter was placed on 7/29 in anticipation for increased pain needs secondary to chest tube replacement  APS consulted for post-procedural acute pain needs  Upon bedside evaluation, Arpit Casper was in bed watching TV  Awake and oriented  She reports "sore" right-sided anterior chest pain after chest tube placement  The ES catheter alleviates the pain somewhat  Denies opioid-induced side effects including nausea/vomiting/itching/constipation  Catheter site c/d/i  No leakage       Plan:   - Continue right T2 erector spinae plane catheter, 0 2% ropivacaine; will increase infusion to 14/5/10/3  - Dilaudid 2 mg/4 mg PO q4hrs PRN for moderate/severe pain  Dilaudid 0 5 mg IV q2hr PRN for breakthrough pain  - Continue aggressive PT/OT, respiratory hygiene    Multimodal analgesia:   - Tylenol 975 mg PO q8hrs standing   - PO lyrica 75/25mg BID  - PO Valium 5mg q6hr PRN for muscle spasms    Bowel Regimen:  - Recommend colace BID and senna qd while on opioids to minimize opioid-induced constipation    APS will continue to follow  Please contact Acute Pain Service - [facility] via "[employer]t from [phone] with additional questions or concerns  See Reji or Shira for additional contacts and after hours information  Pain History  Current pain location(s): Right anterior chest wall  Pain Scale:   0-5/10  Quality: Achy  24 hour history: See above  PCEA requirements: 4 dose requests, 4 doses given    Opioid requirement previous 24 hours: IV fentanyl 200mcg, PO dilaudid 2mg    Meds/Allergies   all current active meds have been reviewed    Allergies   Allergen Reactions    Metformin Diarrhea    Clonidine Rash     Patch        Objective     Temp:  [97 6 °F (36 4 °C)-98 3 °F (36 8 °C)] 98 1 °F (36 7 °C)  HR:  [51-89] 67  Resp:  [17-18] 18  BP: (102-137)/() 137/107  FiO2 (%):  [20-28] 28    Physical Exam  Constitutional:       Appearance: Normal appearance  HENT:      Head: Normocephalic  Mouth/Throat:      Mouth: Mucous membranes are dry  Eyes:      Pupils: Pupils are equal, round, and reactive to light  Neck:      Comments: Trach collar  Cardiovascular:      Rate and Rhythm: Normal rate  Pulses: Normal pulses  Pulmonary:      Effort: Pulmonary effort is normal       Breath sounds: Rhonchi present  Comments: Right anterior chest tube present; site c/d/i  Abdominal:      Palpations: Abdomen is soft  Musculoskeletal:         General: Tenderness present  Comments: Right pectoral region ttp   Skin:     General: Skin is dry  Neurological:      Mental Status: She is alert     Psychiatric:         Mood and Affect: Mood normal          Lab Results:   Results from last 7 days   Lab Units 07/30/22  0459   WBC Thousand/uL 18 08*   HEMOGLOBIN g/dL 7 6*   HEMATOCRIT % 25 0*   PLATELETS Thousands/uL 583*      Results from last 7 days   Lab Units 07/30/22  0459 07/29/22  0447   POTASSIUM mmol/L 4 4 4 3   CHLORIDE mmol/L 98 96   CO2 mmol/L 24 26   BUN mg/dL 50* 47*   CREATININE mg/dL 1 19 1 33*   CALCIUM mg/dL 8 1* 8 5   ALK PHOS U/L  --  91   ALT U/L  --  6*   AST U/L  --  9*       Imaging Studies: I have personally reviewed pertinent reports  EKG, Pathology, and Other Studies: I have personally reviewed pertinent reports  Counseling / Coordination of Care  Total floor / unit time spent today 20 minutes  Greater than 50% of total time was spent with the patient and / or family counseling and / or coordination of care  Please note that the APS provides consultative services regarding pain management only  With the exception of ketamine and epidural infusions and except when indicated, final decisions regarding starting or changing doses of analgesic medications are at the discretion of the consulting service  Off hours consultation and/or medication management is generally not available      Helen Calhoun MD  Acute Pain Service

## 2022-07-30 NOTE — ASSESSMENT & PLAN NOTE
Wt Readings from Last 3 Encounters:   07/30/22 78 7 kg (173 lb 6 4 oz)   07/19/22 78 2 kg (172 lb 6 4 oz)   06/29/22 81 7 kg (180 lb 3 2 oz)     · Most recent EF 50%  Currently stable and euvolemic  · On Bumex 2 mg twice daily with Aldactone 100 mg daily    Will continue  · Continue low-salt diet, monitor I's and O's  · She is status post Cardiology consultation this admission  · Will follow outpatient with Cardiology

## 2022-07-30 NOTE — ASSESSMENT & PLAN NOTE
· STEMI 10/22/21 - PATRICA to mid circumflex  · VT arrest 10/26   · Polymorphic VT x 1 shock 10/28/21  · ICD not placed given risks felt to outweigh benefits with cognitive function and risk of infection at that that time  · Cardiac arrest 1/1/22 - likely VT related - went to [facility]  · No ICD given lung infection and on IV ABX x 4 weeks  · Canceled 2 EP appts since that time and thus no ICD in place - still wears LifeVest  · Most recent EF improved to 50%

## 2022-07-30 NOTE — PROGRESS NOTES
Vancomycin IV Pharmacy-to-Dose Consultation    Toño Gallegos is a 65 y o  female who is currently receiving Vancomycin IV with management by the Pharmacy Consult service  Assessment/Plan:  The patient was reviewed  Renal function is stable and no signs or symptoms of nephrotoxicity and/or infusion reactions were documented in the chart  Random level today resulted as 13 6 so patient will be re-dosed   Based on todays assessment, continue current vancomycin pulse dosing of 1250 mg IV once PRN random level <20, with a plan for random level to be drawn at 0600 on 07/31  We will continue to follow the patients culture results and clinical progress daily      [place] g[place], Pharmacist

## 2022-07-30 NOTE — PROGRESS NOTES
Progress Note - Pulmonary   Kelli Red 65 y o  female MRN: [id number]  Unit/Bed#: S -01 Encounter: [id number]    Assessment/Plan:    1  Acute pulmonary insufficiency on chronic hypoxic respiratory failure      -  patient remains on 6 L-97%, home O2 10 L      -  maintain saturations greater than 89%      -  pulmonary toileting:  Deep breathing cough, OOB as tolerated, suction as needed    2  RLL  empyema neutrophilic predominant       -  7/29/2022- 12 Fr CT       -  24 hr output- 50 mL out       -  ID following       -  Day #5 cefepime       -  procalcitonin - 0 96-- 0 68-- 0 77,     cultures negative       -  chest tube was flushed- will need tPA/dornase    3  Primary spontaneous right-sided pneumothorax now resolved       -  etiology unclear possibly secondary to bleb       -  7/20/2022- 10 Fr  CT       -  7/22/2022- upsize 14 Fr  CT       -  7/24/2022- chest tube removed       -  will continue supportive care       -  no indication for any further treatment    4  Acute on chronic diastolic CHF w/ monitor PHTN likely WHO group II & III and PAF       -  2/2022-  EF 50%    PA pressure 54 mmHg       -  patient overall appears euvolemic       -  will continue I&Os and daily weight       -   cardiology follow-up    5  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       -  11/2022- tracheostomy placed       -  maintained on 6 Shiley uncuffed       -  continue trach care and suction as needed       -  trach changes per ENT    6   Suspected COPD of unknown severity without acute exacerbation       -  Inpatient:  Albuterol as needed       -  home regimen:  Albuterol q 6h p r n        -  no indication for steroids at this time    7  Mild CHANTALE      -  follows with Dr Krupa Palomo      -  will resume BiPAP once closer to discharge given recent pneumothorax/empyema           Chief Complaint:    "I am sore"    Subjective:    Kelli was comfortably sitting in her bed    She reports she is sore where chest tube has been placed  No significant overnight events reported  Patient currently denying any fevers, chills, hemoptysis, headaches, night sweats, palpitations or vomiting  Objective:    Vitals: Blood pressure 124/80, pulse 63, temperature 97 8 °F (36 6 °C), resp  rate 17, weight 77 5 kg (170 lb 12 8 oz), SpO2 97 %  6L,Body mass index is 25 22 kg/m²  Intake/Output Summary (Last 24 hours) at 7/30/2022 0646  Last data filed at 7/30/2022 0529  Gross per 24 hour   Intake 1065 05 ml   Output 1088 ml   Net -22 95 ml       Invasive Devices  Report    Peripheral Intravenous Line  Duration           Peripheral IV 07/28/22 Right Antecubital 2 days          Epidural Line  Duration           Nerve Block Catheter 07/29/22 <1 day          Drain  Duration           Chest Tube 1 Right Posterior 12 Fr  <1 day          Airway  Duration           Surgical Airway Shiley Fenestrated 150 days                Physical Exam:  Physical Exam  Constitutional:       General: She is not in acute distress  Appearance: Normal appearance  She is normal weight  She is not ill-appearing  HENT:      Head: Normocephalic and atraumatic  Nose: Nose normal  No congestion or rhinorrhea  Mouth/Throat:      Mouth: Mucous membranes are dry  Pharynx: No oropharyngeal exudate or posterior oropharyngeal erythema  Cardiovascular:      Rate and Rhythm: Normal rate and regular rhythm  Pulses: Normal pulses  Heart sounds: Normal heart sounds  No murmur heard  No friction rub  No gallop  Pulmonary:      Effort: Pulmonary effort is normal  No tachypnea, bradypnea, accessory muscle usage or respiratory distress  Breath sounds: Decreased air movement present  No stridor  Decreased breath sounds and rhonchi present  No wheezing or rales  Comments: Diminished aeration scattered rhonchi  Chest:      Chest wall: No tenderness  Abdominal:      General: Abdomen is flat  Bowel sounds are normal  There is no distension  Palpations: Abdomen is soft  There is no mass  Musculoskeletal:         General: No swelling or tenderness  Normal range of motion  Cervical back: Normal range of motion  No rigidity or tenderness  Skin:     General: Skin is warm and dry  Coloration: Skin is not jaundiced or pale  Neurological:      General: No focal deficit present  Mental Status: She is alert and oriented to person, place, and time  Mental status is at baseline  Psychiatric:         Mood and Affect: Mood normal          Behavior: Behavior normal          Labs:    I have personally reviewed pertinent lab results CBC:   Lab Results   Component Value Date    WBC 18 08 (H) 07/30/2022    HGB 7 6 (L) 07/30/2022    HCT 25 0 (L) 07/30/2022    MCV 75 (L) 07/30/2022     (H) 07/30/2022    MCH 22 8 (L) 07/30/2022    MCHC 30 4 (L) 07/30/2022    RDW 17 5 (H) 07/30/2022    MPV 9 5 07/30/2022    NRBC 0 07/30/2022   , CMP:   Lab Results   Component Value Date    SODIUM 131 (L) 07/30/2022    K 4 4 07/30/2022    CL 98 07/30/2022    CO2 24 07/30/2022    BUN 50 (H) 07/30/2022    CREATININE 1 19 07/30/2022    CALCIUM 8 1 (L) 07/30/2022    EGFR 51 07/30/2022       Imaging and other studies: I have personally reviewed pertinent films in PACS     Chest x-ray- 7/28/2022- right-sided loculated effusion

## 2022-07-31 ENCOUNTER — APPOINTMENT (INPATIENT)
Dept: RADIOLOGY | Facility: HOSPITAL | Age: 57
DRG: 199 | End: 2022-07-31
Payer: COMMERCIAL

## 2022-07-31 LAB
ANION GAP SERPL CALCULATED.3IONS-SCNC: 12 MMOL/L (ref 4–13)
BASOPHILS # BLD AUTO: 0.06 THOUSANDS/ΜL (ref 0–0.1)
BASOPHILS NFR BLD AUTO: 0 % (ref 0–1)
BUN SERPL-MCNC: 49 MG/DL (ref 5–25)
CALCIUM SERPL-MCNC: 8.1 MG/DL (ref 8.4–10.2)
CHLORIDE SERPL-SCNC: 100 MMOL/L (ref 96–108)
CO2 SERPL-SCNC: 21 MMOL/L (ref 21–32)
CREAT SERPL-MCNC: 1.12 MG/DL (ref 0.6–1.3)
EOSINOPHIL # BLD AUTO: 0.08 THOUSAND/ΜL (ref 0–0.61)
EOSINOPHIL NFR BLD AUTO: 1 % (ref 0–6)
ERYTHROCYTE [DISTWIDTH] IN BLOOD BY AUTOMATED COUNT: 18 % (ref 11.6–15.1)
GFR SERPL CREATININE-BSD FRML MDRD: 55 ML/MIN/1.73SQ M
GLUCOSE SERPL-MCNC: 106 MG/DL (ref 65–140)
GLUCOSE SERPL-MCNC: 244 MG/DL (ref 65–140)
GLUCOSE SERPL-MCNC: 247 MG/DL (ref 65–140)
GLUCOSE SERPL-MCNC: 256 MG/DL (ref 65–140)
GLUCOSE SERPL-MCNC: 266 MG/DL (ref 65–140)
GLUCOSE SERPL-MCNC: 80 MG/DL (ref 65–140)
HCT VFR BLD AUTO: 28.1 % (ref 34.8–46.1)
HGB BLD-MCNC: 8.4 G/DL (ref 11.5–15.4)
IMM GRANULOCYTES # BLD AUTO: 0.32 THOUSAND/UL (ref 0–0.2)
IMM GRANULOCYTES NFR BLD AUTO: 2 % (ref 0–2)
LYMPHOCYTES # BLD AUTO: 1.23 THOUSANDS/ΜL (ref 0.6–4.47)
LYMPHOCYTES NFR BLD AUTO: 8 % (ref 14–44)
MCH RBC QN AUTO: 23 PG (ref 26.8–34.3)
MCHC RBC AUTO-ENTMCNC: 29.9 G/DL (ref 31.4–37.4)
MCV RBC AUTO: 77 FL (ref 82–98)
MONOCYTES # BLD AUTO: 1.57 THOUSAND/ΜL (ref 0.17–1.22)
MONOCYTES NFR BLD AUTO: 10 % (ref 4–12)
NEUTROPHILS # BLD AUTO: 12.87 THOUSANDS/ΜL (ref 1.85–7.62)
NEUTS SEG NFR BLD AUTO: 79 % (ref 43–75)
NRBC BLD AUTO-RTO: 0 /100 WBCS
PLATELET # BLD AUTO: 617 THOUSANDS/UL (ref 149–390)
PMV BLD AUTO: 9.6 FL (ref 8.9–12.7)
POTASSIUM SERPL-SCNC: 4.2 MMOL/L (ref 3.5–5.3)
RBC # BLD AUTO: 3.65 MILLION/UL (ref 3.81–5.12)
SODIUM SERPL-SCNC: 133 MMOL/L (ref 135–147)
VANCOMYCIN SERPL-MCNC: 20.1 UG/ML (ref 10–20)
WBC # BLD AUTO: 16.13 THOUSAND/UL (ref 4.31–10.16)

## 2022-07-31 PROCEDURE — 85025 COMPLETE CBC W/AUTO DIFF WBC: CPT | Performed by: INTERNAL MEDICINE

## 2022-07-31 PROCEDURE — 99232 SBSQ HOSP IP/OBS MODERATE 35: CPT | Performed by: INTERNAL MEDICINE

## 2022-07-31 PROCEDURE — 82948 REAGENT STRIP/BLOOD GLUCOSE: CPT

## 2022-07-31 PROCEDURE — 71045 X-RAY EXAM CHEST 1 VIEW: CPT

## 2022-07-31 PROCEDURE — 80048 BASIC METABOLIC PNL TOTAL CA: CPT | Performed by: INTERNAL MEDICINE

## 2022-07-31 PROCEDURE — 80202 ASSAY OF VANCOMYCIN: CPT | Performed by: INTERNAL MEDICINE

## 2022-07-31 PROCEDURE — 94760 N-INVAS EAR/PLS OXIMETRY 1: CPT

## 2022-07-31 PROCEDURE — 94640 AIRWAY INHALATION TREATMENT: CPT

## 2022-07-31 RX ADMIN — CEFEPIME HYDROCHLORIDE 2000 MG: 2 INJECTION, SOLUTION INTRAVENOUS at 09:01

## 2022-07-31 RX ADMIN — SENNOSIDES AND DOCUSATE SODIUM 1 TABLET: 50; 8.6 TABLET ORAL at 08:59

## 2022-07-31 RX ADMIN — BUMETANIDE 2 MG: 1 TABLET ORAL at 08:59

## 2022-07-31 RX ADMIN — ALTEPLASE 10 MG: 2.2 INJECTION, POWDER, LYOPHILIZED, FOR SOLUTION INTRAVENOUS at 12:32

## 2022-07-31 RX ADMIN — ESCITALOPRAM OXALATE 10 MG: 10 TABLET ORAL at 09:00

## 2022-07-31 RX ADMIN — PREGABALIN 25 MG: 25 CAPSULE ORAL at 18:33

## 2022-07-31 RX ADMIN — VANCOMYCIN HYDROCHLORIDE 1250 MG: 5 INJECTION, POWDER, LYOPHILIZED, FOR SOLUTION INTRAVENOUS at 13:11

## 2022-07-31 RX ADMIN — IPRATROPIUM BROMIDE 0.5 MG: 0.5 SOLUTION RESPIRATORY (INHALATION) at 07:22

## 2022-07-31 RX ADMIN — LEVALBUTEROL HYDROCHLORIDE 1.25 MG: 1.25 SOLUTION, CONCENTRATE RESPIRATORY (INHALATION) at 13:42

## 2022-07-31 RX ADMIN — ROPIVACAINE HYDROCHLORIDE: 2 INJECTION, SOLUTION EPIDURAL; INFILTRATION at 05:19

## 2022-07-31 RX ADMIN — TICAGRELOR 90 MG: 90 TABLET ORAL at 18:35

## 2022-07-31 RX ADMIN — DORNASE ALFA 5 MG: 1 SOLUTION RESPIRATORY (INHALATION) at 12:32

## 2022-07-31 RX ADMIN — PREGABALIN 75 MG: 75 CAPSULE ORAL at 08:59

## 2022-07-31 RX ADMIN — INSULIN LISPRO 6 UNITS: 100 INJECTION, SOLUTION INTRAVENOUS; SUBCUTANEOUS at 21:16

## 2022-07-31 RX ADMIN — HYDROMORPHONE HYDROCHLORIDE 2 MG: 2 TABLET ORAL at 18:40

## 2022-07-31 RX ADMIN — HYDROMORPHONE HYDROCHLORIDE 4 MG: 2 TABLET ORAL at 12:03

## 2022-07-31 RX ADMIN — TICAGRELOR 90 MG: 90 TABLET ORAL at 05:13

## 2022-07-31 RX ADMIN — ROPIVACAINE HYDROCHLORIDE: 2 INJECTION, SOLUTION EPIDURAL; INFILTRATION at 16:00

## 2022-07-31 RX ADMIN — INSULIN LISPRO 4 UNITS: 100 INJECTION, SOLUTION INTRAVENOUS; SUBCUTANEOUS at 13:04

## 2022-07-31 RX ADMIN — INSULIN GLARGINE 45 UNITS: 100 INJECTION, SOLUTION SUBCUTANEOUS at 21:30

## 2022-07-31 RX ADMIN — DIAZEPAM 5 MG: 5 TABLET ORAL at 05:12

## 2022-07-31 RX ADMIN — LEVALBUTEROL HYDROCHLORIDE 1.25 MG: 1.25 SOLUTION, CONCENTRATE RESPIRATORY (INHALATION) at 20:44

## 2022-07-31 RX ADMIN — IPRATROPIUM BROMIDE 0.5 MG: 0.5 SOLUTION RESPIRATORY (INHALATION) at 20:44

## 2022-07-31 RX ADMIN — INSULIN LISPRO 22 UNITS: 100 INJECTION, SOLUTION INTRAVENOUS; SUBCUTANEOUS at 09:02

## 2022-07-31 RX ADMIN — METOPROLOL SUCCINATE 50 MG: 50 TABLET, EXTENDED RELEASE ORAL at 18:35

## 2022-07-31 RX ADMIN — BUMETANIDE 2 MG: 1 TABLET ORAL at 18:33

## 2022-07-31 RX ADMIN — AMIODARONE HYDROCHLORIDE 100 MG: 200 TABLET ORAL at 08:58

## 2022-07-31 RX ADMIN — APIXABAN 5 MG: 5 TABLET, FILM COATED ORAL at 08:59

## 2022-07-31 RX ADMIN — GUAIFENESIN 1200 MG: 600 TABLET ORAL at 21:15

## 2022-07-31 RX ADMIN — CEFEPIME HYDROCHLORIDE 2000 MG: 2 INJECTION, SOLUTION INTRAVENOUS at 21:15

## 2022-07-31 RX ADMIN — IPRATROPIUM BROMIDE 0.5 MG: 0.5 SOLUTION RESPIRATORY (INHALATION) at 13:42

## 2022-07-31 RX ADMIN — LEVALBUTEROL HYDROCHLORIDE 1.25 MG: 1.25 SOLUTION, CONCENTRATE RESPIRATORY (INHALATION) at 07:22

## 2022-07-31 RX ADMIN — PANTOPRAZOLE SODIUM 40 MG: 40 TABLET, DELAYED RELEASE ORAL at 05:12

## 2022-07-31 RX ADMIN — ROPIVACAINE HYDROCHLORIDE: 2 INJECTION, SOLUTION EPIDURAL; INFILTRATION at 21:48

## 2022-07-31 RX ADMIN — HYDROMORPHONE HYDROCHLORIDE 4 MG: 2 TABLET ORAL at 02:30

## 2022-07-31 RX ADMIN — GUAIFENESIN 1200 MG: 600 TABLET ORAL at 08:59

## 2022-07-31 RX ADMIN — APIXABAN 5 MG: 5 TABLET, FILM COATED ORAL at 18:33

## 2022-07-31 RX ADMIN — INSULIN LISPRO 22 UNITS: 100 INJECTION, SOLUTION INTRAVENOUS; SUBCUTANEOUS at 13:04

## 2022-07-31 RX ADMIN — INSULIN LISPRO 6 UNITS: 100 INJECTION, SOLUTION INTRAVENOUS; SUBCUTANEOUS at 09:01

## 2022-07-31 RX ADMIN — SPIRONOLACTONE 100 MG: 100 TABLET ORAL at 08:58

## 2022-07-31 RX ADMIN — ROPIVACAINE HYDROCHLORIDE: 2 INJECTION, SOLUTION EPIDURAL; INFILTRATION at 11:05

## 2022-07-31 RX ADMIN — METOPROLOL SUCCINATE 50 MG: 50 TABLET, EXTENDED RELEASE ORAL at 05:13

## 2022-07-31 RX ADMIN — DESMOPRESSIN ACETATE 40 MG: 0.2 TABLET ORAL at 18:34

## 2022-07-31 NOTE — ASSESSMENT & PLAN NOTE
· Trach placed in the context of cardiac arrest/prolonged ventilator dependent state November 2021  · Decannulated at [facility] 12/11/21  · Patient requires frequent tracheostomy changes and suctioning  · Per discussion with speech therapy,  video barium swallow was done for sense of food getting stuck   Appropriate for regular diet  · On trach collar O2 with humidification

## 2022-07-31 NOTE — ASSESSMENT & PLAN NOTE
Lab Results   Component Value Date    HGBA1C 12 7 (H) 05/22/2022     Recent Labs     07/30/22  1209 07/30/22  1657 07/30/22  2108 07/31/22  0739   POCGLU 215* 184* 248* 266*     Blood Sugar Average: Last 72 hrs:  · (P) 152 [id number]   · Very poorly controlled based on A1c  · Will increase basal/bolus insulin to 45 units HS and 22 units with meals today  · ADA diet, Accu-Cheks a c  HS

## 2022-07-31 NOTE — PROGRESS NOTES
[facility]  Progress Note - Noble Aschoff 1965, 65 y o  female MRN: [id number]  Unit/Bed#: S -01 Encounter: [id number]  Primary Care Provider: Jacobo Desir MD   Date and time admitted to hospital: 7/19/2022 11:33 AM    * Empyema lung, Right  Assessment & Plan  · Loculated right pleural effusion  She is status post spontaneous right-sided pneumothorax with chest tube placement and subsequent removal  · Most recent CT of the chest on 7/28/22 revealed moderate partially loculated right pleural effusion  · Right chest tube placed on 7/29/22 by IR with approximately 50 mL output in the past 24 hours  · Pleural fluid analysis shows neutrophil predominant WBC count  · Fluid cultures from prior anterior chest tube site positive for MRSA  · Currently on IV cefepime and vancomycin  ID following  Appreciate recommendations  · Chest tube management per Pulmonary/IR    Acute on chronic heart failure with preserved ejection fraction [facility])  Assessment & Plan  Wt Readings from Last 3 Encounters:   07/30/22 78 7 kg (173 lb 6 4 oz)   07/19/22 78 2 kg (172 lb 6 4 oz)   06/29/22 81 7 kg (180 lb 3 2 oz)     · Most recent EF 50%  Currently stable and euvolemic  · On Bumex 2 mg twice daily with Aldactone 100 mg daily  Will continue  · Continue low-salt diet, monitor I's and O's  · She is status post Cardiology consultation this admission  · Will follow outpatient with Cardiology    Chest wall wound infection  Assessment & Plan  · Purulent discharge noted at site of recent chest tube placement  · Cultures positive for MRSA  · Currently on IV vancomycin  · Continue local wound care/dressing changes    Persistent pain despite chest tube removal  Assessment & Plan  · Continues with intractable pain at prior chest tube site on right anterior chest wall  Chest tube was taken out on 7/24/22  · Currently followed by acute pain service    She is status post ES plane block on 7/23/22 with limited analgesic effect lasting approximately 6-8 hours  · Currently on multimodal pain regimen with scheduled Tylenol, Lyrica, p r n  Valium, IV and p o  Dilaudid  · Added bowel regimen with senna/Colace b i d  And p r n  MiraLax  · APS following  Appreciate input    Acute Respiratory insufficiency on chronic hypoxic respiratory failure  Assessment & Plan  · She is status post trach  Currently on 6 L with sats in the high 90s, at home uses 10 L  · Status post recent right pneumothorax requiring chest tube and now with right empyema  · Continue aggressive respiratory protocol, p r n  Suctioning  · Encourage out of bed, incentive spirometry  · Pulmonology following    Acute kidney injury superimposed on chronic kidney disease stage 3 [facility])  Assessment & Plan  Lab Results   Component Value Date    EGFR 55 07/31/2022    EGFR 51 07/30/2022    EGFR 44 07/29/2022    CREATININE 1 12 07/31/2022    CREATININE 1 19 07/30/2022    CREATININE 1 33 (H) 07/29/2022   · Creatinine elevated at 1 64 upon transfer on 7/19/22, with baseline being 0 9-1 0  · Resolved  · Continue to monitor closely  Losartan on hold    Hyponatremia  Assessment & Plan  · Corrected sodium for glucose is 135  · No additional workup at this time    Anemia  Assessment & Plan  · Acute on chronic anemia likely related to illness  No overt severe bleeding noted    Tracheostomy in place [facility])  Assessment & Plan  · Trach placed in the context of cardiac arrest/prolonged ventilator dependent state November 2021  · Decannulated at [facility] 12/11/21  · Patient requires frequent tracheostomy changes and suctioning  · Per discussion with speech therapy,  video barium swallow was done for sense of food getting stuck  Appropriate for regular diet  · On trach collar O2 with humidification      CAD (coronary artery disease)  Assessment & Plan  · STEMI 10/22/2021 s/p PATRICA mid circumflex OM1  OM2 was ballooned but not stented     · Pulseless VT 10/27/21 - repeat LHC 90% mid circumflex stenosis, but could not be intervened on  · VT 10/28/21 LHC:  found to have apical LAD complete occlusion was felt to be small to be intervened  · Cardiac carrest 1/1/22 - NO cardiac cath at [facility] felt to be hypoxic vs  VT induced  · On beta-blocker, Brilinta and Lipitor      History of Cardiac arrest [facility])  Assessment & Plan  · STEMI 10/22/21 - PATRICA to mid circumflex  · VT arrest 10/26   · Polymorphic VT x 1 shock 10/28/21  · ICD not placed given risks felt to outweigh benefits with cognitive function and risk of infection at that that time  · Cardiac arrest 1/1/22 - likely VT related - went to [facility] - [facility]  · No ICD given lung infection and on IV ABX x 4 weeks  · Canceled 2 EP appts since that time and thus no ICD in place - still wears LifeVest  · Most recent EF improved to 50%    A-fib [facility])  Assessment & Plan  · On anticoagulation with Eliquis   · Continue amiodarone, Toprol    Type 2 diabetes mellitus with hyperglycemia [facility])  Assessment & Plan  Lab Results   Component Value Date    HGBA1C 12 7 (H) 05/22/2022     Recent Labs     07/30/22  1209 07/30/22  1657 07/30/22  2108 07/31/22  0739   POCGLU 215* 184* 248* 266*     Blood Sugar Average: Last 72 hrs:  · (P) 266 [id number]   · Very poorly controlled based on A1c  · Will increase basal/bolus insulin to 45 units HS and 22 units with meals today  · ADA diet, Accu-Cheks a c  HS          VTE Pharmacologic Prophylaxis: VTE Score: 3 Moderate Risk (Score 3-4) - Pharmacological DVT Prophylaxis Ordered: apixaban (Eliquis)  Patient Centered Rounds: I performed bedside rounds with nursing staff today  Discussions with Specialists or Other Care Team Provider: Pulmonology, APS    Education and Discussions with Family / Patient: Patient declined call to   Current Length of Stay: 12 day(s)  Current Patient Status: Inpatient   Discharge Plan: Pending clinical improvement    Code Status: Level 1 - Full Code    Subjective:    The patient was found in bed today, she still anticipates the pain on the right  Says that she didn't sleep well last night  Other then that no changes     Objective:     Vitals:   Temp (24hrs), Av 8 °F (36 6 °C), Min:97 4 °F (36 3 °C), Max:98 2 °F (36 8 °C)    Temp:  [97 4 °F (36 3 °C)-98 2 °F (36 8 °C)] 98 1 °F (36 7 °C)  HR:  [56-66] 56  Resp:  [16-20] 16  BP: (108-131)/(66-73) 127/73  SpO2:  [87 %-99 %] 95 %  Body mass index is 25 61 kg/m²  Input and Output Summary (last 24 hours): Intake/Output Summary (Last 24 hours) at 2022 0807  Last data filed at 2022 0519  Gross per 24 hour   Intake 1079 35 ml   Output 2040 ml   Net -960 65 ml       Physical Exam:   Physical Exam  Vitals and nursing note reviewed  Constitutional:       General: She is not in acute distress  Appearance: She is well-developed  She is obese  Comments: With trach   HENT:      Head: Normocephalic and atraumatic  Eyes:      Conjunctiva/sclera: Conjunctivae normal    Cardiovascular:      Rate and Rhythm: Normal rate and regular rhythm  Heart sounds: No murmur heard  Pulmonary:      Effort: Pulmonary effort is normal  No respiratory distress  Breath sounds: Normal breath sounds  Abdominal:      Palpations: Abdomen is soft  Tenderness: There is no abdominal tenderness  Musculoskeletal:         General: Tenderness (Right sided, old chest tube area) present  No swelling  Cervical back: Normal range of motion and neck supple  No rigidity  Right lower leg: No edema  Left lower leg: No edema  Skin:     General: Skin is warm and dry  Coloration: Skin is not jaundiced  Neurological:      Mental Status: She is alert and oriented to person, place, and time  Cranial Nerves: No cranial nerve deficit     Psychiatric:         Mood and Affect: Mood normal          Behavior: Behavior normal           Additional Data:     Labs:  Results from last 7 days   Lab Units 22  0443   WBC Thousand/uL 16 13*   HEMOGLOBIN g/dL 8 4*   HEMATOCRIT % 28 1*   PLATELETS Thousands/uL 617*   NEUTROS PCT % 79*   LYMPHS PCT % 8*   MONOS PCT % 10   EOS PCT % 1     Results from last 7 days   Lab Units 07/31/22  0443 07/30/22  0459 07/29/22  0447   SODIUM mmol/L 133*   < > 131*   POTASSIUM mmol/L 4 2   < > 4 3   CHLORIDE mmol/L 100   < > 96   CO2 mmol/L 21   < > 26   BUN mg/dL 49*   < > 47*   CREATININE mg/dL 1 12   < > 1 33*   ANION GAP mmol/L 12   < > 9   CALCIUM mg/dL 8 1*   < > 8 5   ALBUMIN g/dL  --   --  2 6*   TOTAL BILIRUBIN mg/dL  --   --  0 55   ALK PHOS U/L  --   --  91   ALT U/L  --   --  6*   AST U/L  --   --  9*   GLUCOSE RANDOM mg/dL 244*   < > 99    < > = values in this interval not displayed           Results from last 7 days   Lab Units 07/31/22  0739 07/30/22  2108 07/30/22  1657 07/30/22  1209 07/30/22  0747 07/29/22  2051 07/29/22  1541 07/29/22  1135 07/29/22  0724 07/28/22 2056 07/28/22  1523 07/28/22  1128   POC GLUCOSE mg/dl 266* 248* 184* 215* 317* 336* 249* 185* 149* 148* 112 297*         Results from last 7 days   Lab Units 07/29/22  0447 07/27/22  0619 07/26/22  0507   PROCALCITONIN ng/ml 0 77* 0 68* 0 96*       Lines/Drains:  Invasive Devices  Report    Peripheral Intravenous Line  Duration           Peripheral IV 07/28/22 Right Antecubital 3 days          Epidural Line  Duration           Nerve Block Catheter 07/29/22 1 day          Drain  Duration           Chest Tube 1 Right Posterior 12 Fr  1 day          Airway  Duration           Surgical Airway Shiley Fenestrated 151 days                  Telemetry:  Telemetry Orders (From admission, onward)             LifeVest Patient: Continuous Telemetry Monitoring during hospitalization (non-expiring)  Continuous LifeVest Telemetry Monitoring        References:    LifeVest Policy                 Telemetry Reviewed: Normal Sinus Rhythm  Indication for Continued Telemetry Use: Arrthymias requiring medical therapy             Imaging: Reviewed radiology reports from this admission including: chest xray and chest CT scan    Recent Cultures (last 7 days):   Results from last 7 days   Lab Units 07/29/22  1529 07/28/22  1201 07/26/22  1349   SPUTUM CULTURE   --   --  2+ Growth of Staphylococcus aureus*  1+ Growth of Pseudomonas aeruginosa*  1+ Growth of    GRAM STAIN RESULT  1+ Polys  No organisms seen 3+ Polys*  2+ Gram positive cocci in pairs* 1+ Epithelial cells per low power field*  3+ Polys*  1+ Gram positive cocci in pairs*  Rare Gram positive rods*   WOUND CULTURE   --  2+ Growth of Methicillin Resistant Staphylococcus aureus*  --    BODY FLUID CULTURE, STERILE  No growth  --   --        Last 24 Hours Medication List:   Current Facility-Administered Medications   Medication Dose Route Frequency Provider Last Rate    acetaminophen  975 mg Oral Q8H [address] Leda Chand PA-C      albuterol  2 5 mg Nebulization Q4H PRN Leda Chand PA-C      amiodarone  100 mg Oral Daily With Breakfast Leda Chand PA-C      apixaban  5 mg Oral BID Leda Chand PA-C      atorvastatin  40 mg Oral Daily With [employer]ALD      benzonatate  100 mg Oral TID PRN Edel Mccarthy MD      bumetanide  2 mg Oral BID NEELAM Sanchez      cefepime  2,000 mg Intravenous Q12H Megan Martinez DO Stopped (07/30/22 2220)    diazepam  5 mg Oral Q6H PRN Etta Nelson PA-C      escitalopram  10 mg Oral Daily Leda Chand PA-C      ferrous sulfate  325 mg Oral Daily With Breakfast Leda Chand PA-C      guaiFENesin  1,200 mg Oral Q12H [address] Leda Chand PA-C      HYDROmorphone  0 5 mg Intravenous Q2H PRN Bright Hilario Duane, MD      HYDROmorphone  2 mg Oral Q4H PRN Gabriel Haider PA-C      HYDROmorphone  4 mg Oral Q4H PRN Etta Nelson PA-C      insulin glargine  45 Units Subcutaneous HS Treasure Finley MD      insulin lispro  2-12 Units Subcutaneous TID AC Leda Stoudt, PA-C      insulin lispro  2-12 Units Subcutaneous HS Leda Chand, DONALD      insulin lispro  22 Units Subcutaneous TID With Meals Suzanna Peralta MD      ipratropium  0 5 mg Nebulization TID Lanora Stafford, DONALD      levalbuterol  1 25 mg Nebulization TID Lanora Stafford, DONALD      lidocaine  2 patch Topical Daily Leda Chand, DONALD      metoprolol succinate  50 mg Oral Q12H NEELAM Almonte      pantoprazole  40 mg Oral Early Morning Leda Chand, DONALD      polyethylene glycol  17 g Oral Daily PRN uSzanna Peralta MD      pregabalin  25 mg Oral QPM Leda Chand, DONALD      pregabalin  75 mg Oral Daily Leda Chand, DONALD      ropivacaine 0 2%   Epidural Continuous Bright Zena Marnie Alegre MD      senna-docusate sodium  1 tablet Oral BID Suzanna Peralta MD      spironolactone  100 mg Oral Daily Leda Chand, DONALD      ticagrelor  90 mg Oral Q12H Leda Chand, DONALD      trimethobenzamide  200 mg Intramuscular Q6H PRN Rogerio Weston, DONALD      vancomycin  15 mg/kg Intravenous Daily PRN Leda Chand, DONALD      vancomycin  15 mg/kg Intravenous Once Suzanna Peralta MD          Today, Patient Was Seen By: Nicolas Salcido MD    **Please Note: This note may have been constructed using a voice recognition system  **

## 2022-07-31 NOTE — PROGRESS NOTES
Vancomycin IV Pharmacy-to-Dose Consultation    Candi Marrufo is a 65 y o  female who is currently receiving Vancomycin IV with management by the Pharmacy Consult service  Assessment/Plan:  The patient was reviewed  Renal function is improving and no signs or symptoms of nephrotoxicity and/or infusion reactions were documented in the chart  Random level early this morning (13 hrs after dose ) resulted as 20 1 (slightly supra-therapeutic )so patient will get a dose later today to give it time to go below 20  Based on todays assessment, continue current vancomycin pulse dosing of 1250 mg IV PRN random level <20 with a plan for trough to be drawn at 1300 on 08/01  If kidney functions continues to improve patient can go back to schedule dosing   We will continue to follow the patients culture results and clinical progress daily      Karen Holliday, Pharmacist

## 2022-07-31 NOTE — ASSESSMENT & PLAN NOTE
· STEMI 10/22/21 - PATRICA to mid circumflex  · VT arrest 10/26   · Polymorphic VT x 1 shock 10/28/21  · ICD not placed given risks felt to outweigh benefits with cognitive function and risk of infection at that that time  · Cardiac arrest 1/1/22 - likely VT related - went to [facility] - [facility]  · No ICD given lung infection and on IV ABX x 4 weeks  · Canceled 2 EP appts since that time and thus no ICD in place - still wears LifeVest  · Most recent EF improved to 50%

## 2022-07-31 NOTE — QUICK NOTE
tPA and dornase instilled into chest tube  There was no air leak before the procedure  Chest tube will remain clamped for 1 hour  Patient tolerated procedure well  Discussed with nursing to unclamp chest tube at 1330

## 2022-07-31 NOTE — PROGRESS NOTES
Progress Note - Infectious Disease   Benjamín Miu 65 y o  female MRN: [id number]  Unit/Bed#: S -01 Encounter: [id number]      IMPRESSION & RECOMMENDATIONS:   Impression/Recommendations:  1  Developing sepsis   Tachypnea, leukocytosis   Secondary to #2   With worsening leukocytosis likely due to source control issue, now improving post chest tube placement   Otherwise remains hemodynamically stable      -continue IV vancomycin for now with dosing recommendations for pharmacy  -continue IV cefepime  -follow temperatures and hemodynamics  -follow CBC     2  Loculated right effusion/empyema   Most recent CT shows new partially loculated right pleural effusion   No chest wall or lung abscesses  Sputum culture from 07/26 showed MSSA and Pseudomonas, which could reflect chronic colonization   Patient does have history of MRSA pneumonia in late 2021  Now status post chest tube on 07/29  Fluid WBC count is nearly 10,000 with neutrophil predominance  Culture is preliminarily negative      -antibiotics as above  -follow-up pleural fluid culture  -chest tube management per IR/pulmonology  -will need 3 week course of antibiotic  Final antibiotic plan to be determined pending results of pleural fluid culture      3  Chest wall wound infection   At the site of recent chest tube placement for management #4   CT shows small loculated component at this site with no fluid collection in the chest wall   Superficial culture now shows MRSA      -continue vancomycin, as above  -serial exams  -daily local wound care and dressing changes     4  Recent spontaneous pneumothorax   Status post chest tube placement on 07/20, removal on 07/24   Resolved on repeat imaging      5   Acute respiratory insufficiency, on chronic hypoxic respiratory failure/tracheostomy dependence   Multifactorial in the setting of recent pneumothorax, new right pleural effusion, acute CHF, COPD      -antibiotic plan as above  -monitor O2 requirements  -pulmonology follow-up ongoing  -volume management as per Internal Medicine Service     6  DM 2, with hyperglycemia and elevated hemoglobin A1c of 12 7   Risk factor for infection   Glycemic control as per Internal Medicine Service      7  Acute kidney injury, on CKD 3  Creatinine is now much improved      -dose adjust antibiotic based on renal function as indicated  -follow BMP     Antibiotics:  Vancomycin/cefepime 5        Subjective:  Stable respiratory symptoms  Receiving tPA/dornase to chest tube  No fevers  Stable O2 requirements  Objective:  Vitals:  Temp:  [97 4 °F (36 3 °C)-98 1 °F (36 7 °C)] 98 1 °F (36 7 °C)  HR:  [56-66] 56  Resp:  [16-20] 16  BP: (108-131)/(66-73) 127/73  SpO2:  [87 %-96 %] 96 %  Temp (24hrs), Av 7 °F (36 5 °C), Min:97 4 °F (36 3 °C), Max:98 1 °F (36 7 °C)  Current: Temperature: 98 1 °F (36 7 °C)    Physical Exam:   General:  No acute distress, debilitated, not toxic  HEENT:  Atraumatic normocephalic  Neck:  Trach in place  Psychiatric:  Awake and alert  Pulmonary:  Normal respiratory excursion without accessory muscle use  New right-sided chest tube in place  Abdomen:  Soft, nontender  Extremities:  Stable edema  Skin:  No rashes    Lab Results:  I have personally reviewed pertinent labs  Results from last 7 days   Lab Units 22  0443 22  0459 22  0447 22  0507 22  0522   POTASSIUM mmol/L 4 2 4 4 4 3   < > 3 9   CHLORIDE mmol/L 100 98 96   < > 98   CO2 mmol/L 21 24 26   < > 27   BUN mg/dL 49* 50* 47*   < > 31*   CREATININE mg/dL 1 12 1 19 1 33*   < > 0 88   EGFR ml/min/1 73sq m 55 51 44   < > 73   CALCIUM mg/dL 8 1* 8 1* 8 5   < > 8 7   AST U/L  --   --  9*  --  10*   ALT U/L  --   --  6*  --  7   ALK PHOS U/L  --   --  91  --  96    < > = values in this interval not displayed       Results from last 7 days   Lab Units 22  0443 22  0459 22  0447   WBC Thousand/uL 16 13* 18 08* 17 26*   HEMOGLOBIN g/dL 8 4* 7 6* 8 2* PLATELETS Thousands/uL 617* 583* 505*     Results from last 7 days   Lab Units 07/29/22  1529 07/28/22  1201 07/26/22  1349 07/26/22  1109   SPUTUM CULTURE   --   --  2+ Growth of Staphylococcus aureus*  1+ Growth of Pseudomonas aeruginosa*  1+ Growth of   --    GRAM STAIN RESULT  1+ Polys  No organisms seen 3+ Polys*  2+ Gram positive cocci in pairs* 1+ Epithelial cells per low power field*  3+ Polys*  1+ Gram positive cocci in pairs*  Rare Gram positive rods*  --    WOUND CULTURE   --  2+ Growth of Methicillin Resistant Staphylococcus aureus*  --   --    BODY FLUID CULTURE, STERILE  No growth  --   --   --    MRSA CULTURE ONLY   --   --   --  No Methicillin Resistant Staphlyococcus aureus (MRSA) isolated       Imaging Studies:   I have personally reviewed pertinent imaging study reports and images in PACS  EKG, Pathology, and Other Studies:   I have personally reviewed pertinent reports

## 2022-07-31 NOTE — PLAN OF CARE
Problem: Potential for Falls  Goal: Patient will remain free of falls  Description: INTERVENTIONS:  - Educate patient/family on patient safety including physical limitations  - Instruct patient to call for assistance with activity   - Consult OT/PT to assist with strengthening/mobility   - Keep Call bell within reach  - Keep bed low and locked with side rails adjusted as appropriate  - Keep care items and personal belongings within reach  - Initiate and maintain comfort rounds  - Make Fall Risk Sign visible to staff  - Offer Toileting every 2 Hours, in advance of need  - Initiate/Maintain bed alarm  - Obtain necessary fall risk management equipment:  bed alarm  - Apply yellow socks and bracelet for high fall risk patients  - Consider moving patient to room near nurses station  Outcome: Progressing     Problem: Nutrition/Hydration-ADULT  Goal: Nutrient/Hydration intake appropriate for improving, restoring or maintaining nutritional needs  Description: Monitor and assess patient's nutrition/hydration status for malnutrition  Collaborate with interdisciplinary team and initiate plan and interventions as ordered  Monitor patient's weight and dietary intake as ordered or per policy  Utilize nutrition screening tool and intervene as necessary  Determine patient's food preferences and provide high-protein, high-caloric foods as appropriate       INTERVENTIONS:  - Monitor oral intake, urinary output, labs, and treatment plans  - Assess nutrition and hydration status and recommend course of action  - Evaluate amount of meals eaten  - Assist patient with eating if necessary   - Allow adequate time for meals  - Recommend/ encourage appropriate diets, oral nutritional supplements, and vitamin/mineral supplements  - Order, calculate, and assess calorie counts as needed  - Recommend, monitor, and adjust tube feedings and TPN/PPN based on assessed needs  - Assess need for intravenous fluids  - Provide specific nutrition/hydration education as appropriate  - Include patient/family/caregiver in decisions related to nutrition  Outcome: Progressing     Problem: PAIN - ADULT  Goal: Verbalizes/displays adequate comfort level or baseline comfort level  Description: Interventions:  - Encourage patient to monitor pain and request assistance  - Assess pain using appropriate pain scale  - Administer analgesics based on type and severity of pain and evaluate response  - Implement non-pharmacological measures as appropriate and evaluate response  - Consider cultural and social influences on pain and pain management  - Notify physician/advanced practitioner if interventions unsuccessful or patient reports new pain  Outcome: Progressing     Problem: INFECTION - ADULT  Goal: Absence or prevention of progression during hospitalization  Description: INTERVENTIONS:  - Assess and monitor for signs and symptoms of infection  - Monitor lab/diagnostic results  - Monitor all insertion sites, i e  indwelling lines, tubes, and drains  - Monitor endotracheal if appropriate and nasal secretions for changes in amount and color  - [place] appropriate cooling/warming therapies per order  - Administer medications as ordered  - Instruct and encourage patient and family to use good hand hygiene technique  - Identify and instruct in appropriate isolation precautions for identified infection/condition  Outcome: Progressing  Goal: Absence of fever/infection during neutropenic period  Description: INTERVENTIONS:  - Monitor WBC    Outcome: Progressing     Problem: SAFETY ADULT  Goal: Patient will remain free of falls  Description: INTERVENTIONS:  - Educate patient/family on patient safety including physical limitations  - Instruct patient to call for assistance with activity   - Consult OT/PT to assist with strengthening/mobility   - Keep Call bell within reach  - Keep bed low and locked with side rails adjusted as appropriate  - Keep care items and personal belongings within reach  - Initiate and maintain comfort rounds  - Make Fall Risk Sign visible to staff  - Offer Toileting every 2 Hours, in advance of need  - Initiate/Maintain  bed alarm  - Obtain necessary fall risk management equipment:  bed alarm  - Apply yellow socks and bracelet for high fall risk patients  - Consider moving patient to room near nurses station  Outcome: Progressing  Goal: Maintain or return to baseline ADL function  Description: INTERVENTIONS:  -  Assess patient's ability to carry out ADLs; assess patient's baseline for ADL function and identify physical deficits which impact ability to perform ADLs (bathing, care of mouth/teeth, toileting, grooming, dressing, etc )  - Assess/evaluate cause of self-care deficits   - Assess range of motion  - Assess patient's mobility; develop plan if impaired  - Assess patient's need for assistive devices and provide as appropriate  - Encourage maximum independence but intervene and supervise when necessary  - Involve family in performance of ADLs  - Assess for home care needs following discharge   - Consider OT consult to assist with ADL evaluation and planning for discharge  - Provide patient education as appropriate  Outcome: Progressing  Goal: Maintains/Returns to pre admission functional level  Description: INTERVENTIONS:  - Perform BMAT or MOVE assessment daily    - Set and communicate daily mobility goal to care team and patient/family/caregiver  - Collaborate with rehabilitation services on mobility goals if consulted  - Perform Range of Motion 4 times a day  - Reposition patient every 2 hours    - Dangle patient 4 times a day  - Stand patient 4 times a day  - Ambulate patient 4 times a day  - Out of bed to chair 4 times a day   - Out of bed for meals 4 times a day  - Out of bed for toileting  - Record patient progress and toleration of activity level   Outcome: Progressing     Problem: DISCHARGE PLANNING  Goal: Discharge to home or other facility with appropriate resources  Description: INTERVENTIONS:  - Identify barriers to discharge w/patient and caregiver  - Arrange for needed discharge resources and transportation as appropriate  - Identify discharge learning needs (meds, wound care, etc )  - Arrange for interpretive services to assist at discharge as needed  - Refer to Case Management Department for coordinating discharge planning if the patient needs post-hospital services based on physician/advanced practitioner order or complex needs related to functional status, cognitive ability, or social support system  Outcome: Progressing     Problem: MOBILITY - ADULT  Goal: Maintain or return to baseline ADL function  Description: INTERVENTIONS:  -  Assess patient's ability to carry out ADLs; assess patient's baseline for ADL function and identify physical deficits which impact ability to perform ADLs (bathing, care of mouth/teeth, toileting, grooming, dressing, etc )  - Assess/evaluate cause of self-care deficits   - Assess range of motion  - Assess patient's mobility; develop plan if impaired  - Assess patient's need for assistive devices and provide as appropriate  - Encourage maximum independence but intervene and supervise when necessary  - Involve family in performance of ADLs  - Assess for home care needs following discharge   - Consider OT consult to assist with ADL evaluation and planning for discharge  - Provide patient education as appropriate  Outcome: Progressing  Goal: Maintains/Returns to pre admission functional level  Description: INTERVENTIONS:  - Perform BMAT or MOVE assessment daily    - Set and communicate daily mobility goal to care team and patient/family/caregiver  - Collaborate with rehabilitation services on mobility goals if consulted  - Perform Range of Motion 4 times a day  - Reposition patient every 2 hours    - Dangle patient 4 times a day  - Stand patient 4 times a day  - Ambulate patient 4 times a day  - Out of bed to chair 4 times a day   - Out of bed for meals 4 times a day  - Out of bed for toileting  - Record patient progress and toleration of activity level   Outcome: Progressing     Problem: Knowledge Deficit  Goal: Patient/family/caregiver demonstrates understanding of disease process, treatment plan, medications, and discharge instructions  Description: Complete learning assessment and assess knowledge base    Interventions:  - Provide teaching at level of understanding  - Provide teaching via preferred learning methods  Outcome: Progressing     Problem: Prexisting or High Potential for Compromised Skin Integrity  Goal: Skin integrity is maintained or improved  Description: INTERVENTIONS:  - Identify patients at risk for skin breakdown  - Assess and monitor skin integrity  - Assess and monitor nutrition and hydration status  - Monitor labs   - Assess for incontinence   - Turn and reposition patient  - Assist with mobility/ambulation  - Relieve pressure over bony prominences  - Avoid friction and shearing  - Provide appropriate hygiene as needed including keeping skin clean and dry  - Evaluate need for skin moisturizer/barrier cream  - Collaborate with interdisciplinary team   - Patient/family teaching  - Consider wound care consult   Outcome: Progressing

## 2022-07-31 NOTE — ASSESSMENT & PLAN NOTE
· She is status post trach  Currently on 6 L with sats in the high 90s, at home uses 10 L  · Status post recent right pneumothorax requiring chest tube and now with right empyema  · Continue aggressive respiratory protocol, p r n   Suctioning  · Encourage out of bed, incentive spirometry  · Pulmonology following

## 2022-07-31 NOTE — PROGRESS NOTES
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  Acute on chronic diastolic CHF and pulmonary hypertension  Suspected COPD of unknown severity without acute exacerbation  Mild CHANTALE not currently on BiPAP    · Continue maintain SpO2 greater than 89%  · Pulmonary toileting  · Continue trach care, maintenance per ENT  · Continue to monitor chest tube output  Will plan for dose # 2 TPA/dornase today  · Continue antibiotics per ID  · Albuterol as needed  · Resume BiPAP closer to discharge    Discussed with pulmonary attending    Subjective:   Patient reports shortness of breath which is unchanged  She denies significant pain with receiving tPA/dornase through chest tube yesterday    Objective:     Vitals: Blood pressure 127/73, pulse 56, temperature 98 1 °F (36 7 °C), resp  rate 16, weight 78 7 kg (173 lb 6 4 oz), SpO2 93 %  ,Body mass index is 25 61 kg/m²  Intake/Output Summary (Last 24 hours) at 7/31/2022 1154  Last data filed at 7/31/2022 7732  Gross per 24 hour   Intake 959 35 ml   Output 2040 ml   Net -1080 65 ml       Invasive Devices  Report    Peripheral Intravenous Line  Duration           Peripheral IV 07/28/22 Right Antecubital 3 days          Epidural Line  Duration           Nerve Block Catheter 07/29/22 2 days          Drain  Duration           Chest Tube 1 Right Posterior 12 Fr  1 day          Airway  Duration           Surgical Airway Shiley Fenestrated 151 days                Physical Exam:   General appearance: Alert and oriented, in no acute distress  Head: Normocephalic, without obvious abnormality, atraumatic  Eyes: EOMI  No discharge bilaterally   No scleral icterus  Neck: +Tracheostomy present  Lungs: Decreased breath sounds  R sided chest tube in place to suction  No air leak noted  Approximately 630 cc bloody serosanguineous drainage in [place] drainage system  Heart: Regular rate and rhythm, S1, S2 normal, no murmur  Abdomen:  No appreciable distension or tenderness  Extremities: No edema or tenderness  Skin: Warm and dry  Neurologic: No acute focal deficits are noted      Labs: I have personally reviewed pertinent lab results  Imaging and other studies: I have personally reviewed pertinent reports

## 2022-07-31 NOTE — ASSESSMENT & PLAN NOTE
Lab Results   Component Value Date    EGFR 55 07/31/2022    EGFR 51 07/30/2022    EGFR 44 07/29/2022    CREATININE 1 12 07/31/2022    CREATININE 1 19 07/30/2022    CREATININE 1 33 (H) 07/29/2022   · Creatinine elevated at 1 64 upon transfer on 7/19/22, with baseline being 0 9-1 0  · Resolved  · Continue to monitor closely    Losartan on hold

## 2022-07-31 NOTE — PROGRESS NOTES
Peripheral Nerve Catheter Follow-up Note - Acute Pain Service    Amanda Lee 65 y o  female MRN: [id number]  Unit/Bed#: S -01 Encounter: [id number]      Assessment:   Principal Problem:    Empyema lung, Right  Active Problems:    Type 2 diabetes mellitus with hyperglycemia (HCC)    A-fib (HCC)    History of Cardiac arrest (HCC)    CAD (coronary artery disease)    Tracheostomy in place ([facility] )    Anemia    Hyponatremia    Acute kidney injury superimposed on chronic kidney disease stage 3 (HCC)    Acute Respiratory insufficiency on chronic hypoxic respiratory failure    Persistent pain despite chest tube removal    Chest wall wound infection    Acute on chronic heart failure with preserved ejection fraction ([facility] [address] )    Kelli Price is a 65y o  year old female with PMHx of VT arrest leading to prolonged intubation/ICU stay (11/2021)  Subsequent subglottic stenosis requiring trach, DM type 2, CKD stage 3, who had a right PTX s/p chest tube placement on 7/20  She had a right ES plane block with exparel on 7/23 with limited analgesic benefit (6-8 hours)  The chest tube was subsequently removed on 7/24 but her hospital course has been c/b persistent right chest wall pain and drainage from chest tube removal site  CT revealed right-sided loculated pleural effusion/empyema s/p chest tube placement on 7/29  A right T2 NOELLE catheter was placed on 7/29 in anticipation for increased pain needs secondary to chest tube replacement  APS consulted for post-procedural acute pain needs  Upon careful inspection, the chest tube was placed in the posterior-lateral area of the mid-thoracic spine  Because of this, Hillary Batres reports very little benefit from the nerve block catheter even with extra PCEA dosing  However, the PO/IV dilaudid seems to alleviate any worsening of her chest wall pain  Was able to get some sleep overnight  Denies opioid-induced side effects including nausea/vomiting/itching/constipation   Catheter site c/d/i      Plan: I offered to replace NOELLE catheter closer to her chest tube site, but Kelli refused and preferred medical management of her pain at this point     - Continue right T2 NOELLE catheter, 0 2% ropivacaine @14/5/10/3; may consider removal in the next 1-2 days  - Recommend Dilaudid 2 mg/4 mg PO q4hrs PRN for moderate/severe pain  Dilaudid 0 5 mg IV q2hr PRN for breakthrough pain   - Continue aggressive PT/OT/respiratory hygiene    - Multimodal analgesia:  - Tylenol 975 mg PO q8hrs standing   - PO Valium 5mg q6hr PRN for muscle spasms/anxiety  - PO Lyrica 75/25mg BID  - Lidoderm    Bowel regimen:  Senokot BID  Miralax PRN    APS will continue to follow  Please contact Acute Pain Service - [facility] via [facility] from [phone] with additional questions or concerns  See Reji or Shira for additional contacts and after hours information  Pain History  Current pain location(s): Right chest wall mostly in the posterior-lateral mid-thoracic region where chest tube is  Pain Scale:   6-8/10  Quality: Sharp  24 hour history: See above    Opioid requirement previous 24 hours: PO dilaudid 10mg, IV dilaudid 1mg    Meds/Allergies   all current active meds have been reviewed    Allergies   Allergen Reactions    Metformin Diarrhea    Clonidine Rash     Patch        Objective     Temp:  [97 4 °F (36 3 °C)-98 2 °F (36 8 °C)] 98 1 °F (36 7 °C)  HR:  [56-66] 56  Resp:  [16-20] 16  BP: (108-131)/(66-73) 127/73    Physical Exam  Constitutional:       Appearance: She is ill-appearing  HENT:      Head: Normocephalic  Mouth/Throat:      Mouth: Mucous membranes are dry  Eyes:      Pupils: Pupils are equal, round, and reactive to light  Cardiovascular:      Rate and Rhythm: Normal rate  Pulses: Normal pulses  Pulmonary:      Effort: Pulmonary effort is normal    Chest:      Chest wall: Tenderness (Right posterior-lateral, mid-thoracic) present  Abdominal:      Palpations: Abdomen is soft     Skin:     General: Skin is dry  Neurological:      General: No focal deficit present  Mental Status: She is alert and oriented to person, place, and time  Psychiatric:         Mood and Affect: Mood normal        Catheter: Right erector spinae catheter site clean/dry/intact, no surrounding erythema/edema/induration    Lab Results:   Results from last 7 days   Lab Units 07/31/22 0443   WBC Thousand/uL 16 13*   HEMOGLOBIN g/dL 8 4*   HEMATOCRIT % 28 1*   PLATELETS Thousands/uL 617*      Results from last 7 days   Lab Units 07/31/22 0443 07/30/22 0459 07/29/22 0447   POTASSIUM mmol/L 4 2   < > 4 3   CHLORIDE mmol/L 100   < > 96   CO2 mmol/L 21   < > 26   BUN mg/dL 49*   < > 47*   CREATININE mg/dL 1 12   < > 1 33*   CALCIUM mg/dL 8 1*   < > 8 5   ALK PHOS U/L  --   --  91   ALT U/L  --   --  6*   AST U/L  --   --  9*    < > = values in this interval not displayed  Imaging Studies: I have personally reviewed pertinent reports  EKG, Pathology, and Other Studies: I have personally reviewed pertinent reports  Counseling / Coordination of Care  Total floor / unit time spent today 20 minutes  Greater than 50% of total time was spent with the patient and / or family counseling and / or coordination of care  Please note that the APS provides consultative services regarding pain management only  With the exception of ketamine, peripheral nerve catheters, and epidural infusions (and except when indicated), final decisions regarding starting or changing doses of analgesic medications are at the discretion of the consulting service  Off hours consultation and/or medication management is generally not available      Jovany Meeks MD  Acute Pain Service

## 2022-07-31 NOTE — QUICK NOTE
Patient didn't receive Lantus last night  Please don't hold Lantus if the patient skips a meal since long acting insulin is not going to cause much issues in that case

## 2022-07-31 NOTE — ASSESSMENT & PLAN NOTE
· STEMI 10/22/2021 s/p PATRICA mid circumflex OM1  OM2 was ballooned but not stented  · Pulseless VT 10/27/21 - repeat LHC 90% mid circumflex stenosis, but could not be intervened on  · VT 10/28/21 LHC:  found to have apical LAD complete occlusion was felt to be small to be intervened    · Cardiac carrest 1/1/22 - [facility] felt to be hypoxic vs  VT induced  · On beta-blocker, Brilinta and Lipitor

## 2022-08-01 LAB
ANION GAP SERPL CALCULATED.3IONS-SCNC: 10 MMOL/L (ref 4–13)
ANISOCYTOSIS BLD QL SMEAR: PRESENT
BACTERIA SPEC BFLD CULT: NO GROWTH
BASOPHILS # BLD MANUAL: 0 THOUSAND/UL (ref 0–0.1)
BASOPHILS NFR MAR MANUAL: 0 % (ref 0–1)
BUN SERPL-MCNC: 40 MG/DL (ref 5–25)
CALCIUM SERPL-MCNC: 8.3 MG/DL (ref 8.4–10.2)
CHLORIDE SERPL-SCNC: 99 MMOL/L (ref 96–108)
CO2 SERPL-SCNC: 25 MMOL/L (ref 21–32)
CREAT SERPL-MCNC: 1.07 MG/DL (ref 0.6–1.3)
EOSINOPHIL # BLD MANUAL: 0.35 THOUSAND/UL (ref 0–0.4)
EOSINOPHIL NFR BLD MANUAL: 2 % (ref 0–6)
ERYTHROCYTE [DISTWIDTH] IN BLOOD BY AUTOMATED COUNT: 17.8 % (ref 11.6–15.1)
GFR SERPL CREATININE-BSD FRML MDRD: 58 ML/MIN/1.73SQ M
GLUCOSE SERPL-MCNC: 125 MG/DL (ref 65–140)
GLUCOSE SERPL-MCNC: 165 MG/DL (ref 65–140)
GLUCOSE SERPL-MCNC: 174 MG/DL (ref 65–140)
GLUCOSE SERPL-MCNC: 189 MG/DL (ref 65–140)
GLUCOSE SERPL-MCNC: 194 MG/DL (ref 65–140)
GRAM STN SPEC: NORMAL
GRAM STN SPEC: NORMAL
HCT VFR BLD AUTO: 25.7 % (ref 34.8–46.1)
HCT VFR BLD AUTO: 27.8 % (ref 34.8–46.1)
HGB BLD-MCNC: 7.7 G/DL (ref 11.5–15.4)
HGB BLD-MCNC: 7.8 G/DL (ref 11.5–15.4)
HYPERCHROMIA BLD QL SMEAR: PRESENT
LYMPHOCYTES # BLD AUTO: 0.87 THOUSAND/UL (ref 0.6–4.47)
LYMPHOCYTES # BLD AUTO: 5 % (ref 14–44)
MCH RBC QN AUTO: 22 PG (ref 26.8–34.3)
MCHC RBC AUTO-ENTMCNC: 28.1 G/DL (ref 31.4–37.4)
MCV RBC AUTO: 78 FL (ref 82–98)
MONOCYTES # BLD AUTO: 0.87 THOUSAND/UL (ref 0–1.22)
MONOCYTES NFR BLD: 5 % (ref 4–12)
MYELOCYTES NFR BLD MANUAL: 1 % (ref 0–1)
NEUTROPHILS # BLD MANUAL: 15.19 THOUSAND/UL (ref 1.85–7.62)
NEUTS SEG NFR BLD AUTO: 87 % (ref 43–75)
PLATELET # BLD AUTO: 611 THOUSANDS/UL (ref 149–390)
PLATELET BLD QL SMEAR: ABNORMAL
PMV BLD AUTO: 9.5 FL (ref 8.9–12.7)
POLYCHROMASIA BLD QL SMEAR: PRESENT
POTASSIUM SERPL-SCNC: 4.1 MMOL/L (ref 3.5–5.3)
RBC # BLD AUTO: 3.55 MILLION/UL (ref 3.81–5.12)
SODIUM SERPL-SCNC: 134 MMOL/L (ref 135–147)
VANCOMYCIN SERPL-MCNC: 15.5 UG/ML (ref 10–20)
WBC # BLD AUTO: 17.46 THOUSAND/UL (ref 4.31–10.16)

## 2022-08-01 PROCEDURE — 99232 SBSQ HOSP IP/OBS MODERATE 35: CPT | Performed by: ANESTHESIOLOGY

## 2022-08-01 PROCEDURE — 85014 HEMATOCRIT: CPT | Performed by: FAMILY MEDICINE

## 2022-08-01 PROCEDURE — 80048 BASIC METABOLIC PNL TOTAL CA: CPT

## 2022-08-01 PROCEDURE — 99232 SBSQ HOSP IP/OBS MODERATE 35: CPT | Performed by: INTERNAL MEDICINE

## 2022-08-01 PROCEDURE — 94760 N-INVAS EAR/PLS OXIMETRY 1: CPT

## 2022-08-01 PROCEDURE — 84134 ASSAY OF PREALBUMIN: CPT | Performed by: FAMILY MEDICINE

## 2022-08-01 PROCEDURE — 85027 COMPLETE CBC AUTOMATED: CPT

## 2022-08-01 PROCEDURE — 94640 AIRWAY INHALATION TREATMENT: CPT

## 2022-08-01 PROCEDURE — 85007 BL SMEAR W/DIFF WBC COUNT: CPT

## 2022-08-01 PROCEDURE — 80202 ASSAY OF VANCOMYCIN: CPT | Performed by: INTERNAL MEDICINE

## 2022-08-01 PROCEDURE — 99233 SBSQ HOSP IP/OBS HIGH 50: CPT | Performed by: INTERNAL MEDICINE

## 2022-08-01 PROCEDURE — 82948 REAGENT STRIP/BLOOD GLUCOSE: CPT

## 2022-08-01 PROCEDURE — 85018 HEMOGLOBIN: CPT | Performed by: FAMILY MEDICINE

## 2022-08-01 RX ORDER — CEFEPIME HYDROCHLORIDE 2 G/50ML
2000 INJECTION, SOLUTION INTRAVENOUS EVERY 8 HOURS
Status: DISCONTINUED | OUTPATIENT
Start: 2022-08-01 | End: 2022-08-02

## 2022-08-01 RX ORDER — HYDROXYZINE HYDROCHLORIDE 25 MG/1
25 TABLET, FILM COATED ORAL EVERY 6 HOURS PRN
Status: DISCONTINUED | OUTPATIENT
Start: 2022-08-01 | End: 2022-08-02 | Stop reason: HOSPADM

## 2022-08-01 RX ORDER — POLYETHYLENE GLYCOL 3350 17 G/17G
17 POWDER, FOR SOLUTION ORAL DAILY
Status: DISCONTINUED | OUTPATIENT
Start: 2022-08-01 | End: 2022-08-02 | Stop reason: HOSPADM

## 2022-08-01 RX ADMIN — ROPIVACAINE HYDROCHLORIDE: 2 INJECTION, SOLUTION EPIDURAL; INFILTRATION at 10:21

## 2022-08-01 RX ADMIN — INSULIN GLARGINE 45 UNITS: 100 INJECTION, SOLUTION SUBCUTANEOUS at 21:27

## 2022-08-01 RX ADMIN — DESMOPRESSIN ACETATE 40 MG: 0.2 TABLET ORAL at 17:02

## 2022-08-01 RX ADMIN — BUMETANIDE 2 MG: 1 TABLET ORAL at 17:01

## 2022-08-01 RX ADMIN — ROPIVACAINE HYDROCHLORIDE: 2 INJECTION, SOLUTION EPIDURAL; INFILTRATION at 16:09

## 2022-08-01 RX ADMIN — IPRATROPIUM BROMIDE 0.5 MG: 0.5 SOLUTION RESPIRATORY (INHALATION) at 07:45

## 2022-08-01 RX ADMIN — APIXABAN 5 MG: 5 TABLET, FILM COATED ORAL at 17:02

## 2022-08-01 RX ADMIN — AMIODARONE HYDROCHLORIDE 100 MG: 200 TABLET ORAL at 08:10

## 2022-08-01 RX ADMIN — TICAGRELOR 90 MG: 90 TABLET ORAL at 05:10

## 2022-08-01 RX ADMIN — INSULIN LISPRO 2 UNITS: 100 INJECTION, SOLUTION INTRAVENOUS; SUBCUTANEOUS at 21:26

## 2022-08-01 RX ADMIN — HYDROMORPHONE HYDROCHLORIDE 2 MG: 2 TABLET ORAL at 00:01

## 2022-08-01 RX ADMIN — LEVALBUTEROL HYDROCHLORIDE 1.25 MG: 1.25 SOLUTION, CONCENTRATE RESPIRATORY (INHALATION) at 13:58

## 2022-08-01 RX ADMIN — CEFEPIME HYDROCHLORIDE 2000 MG: 2 INJECTION, SOLUTION INTRAVENOUS at 09:20

## 2022-08-01 RX ADMIN — CEFEPIME HYDROCHLORIDE 2000 MG: 2 INJECTION, SOLUTION INTRAVENOUS at 17:30

## 2022-08-01 RX ADMIN — PREGABALIN 75 MG: 75 CAPSULE ORAL at 08:10

## 2022-08-01 RX ADMIN — IPRATROPIUM BROMIDE 0.5 MG: 0.5 SOLUTION RESPIRATORY (INHALATION) at 19:40

## 2022-08-01 RX ADMIN — GUAIFENESIN 1200 MG: 600 TABLET ORAL at 08:10

## 2022-08-01 RX ADMIN — PREGABALIN 25 MG: 25 CAPSULE ORAL at 17:01

## 2022-08-01 RX ADMIN — TICAGRELOR 90 MG: 90 TABLET ORAL at 17:02

## 2022-08-01 RX ADMIN — ESCITALOPRAM OXALATE 10 MG: 10 TABLET ORAL at 08:10

## 2022-08-01 RX ADMIN — APIXABAN 5 MG: 5 TABLET, FILM COATED ORAL at 08:10

## 2022-08-01 RX ADMIN — HYDROMORPHONE HYDROCHLORIDE 4 MG: 2 TABLET ORAL at 15:24

## 2022-08-01 RX ADMIN — FERROUS SULFATE TAB 325 MG (65 MG ELEMENTAL FE) 325 MG: 325 (65 FE) TAB at 08:10

## 2022-08-01 RX ADMIN — INSULIN LISPRO 2 UNITS: 100 INJECTION, SOLUTION INTRAVENOUS; SUBCUTANEOUS at 18:42

## 2022-08-01 RX ADMIN — ROPIVACAINE HYDROCHLORIDE: 2 INJECTION, SOLUTION EPIDURAL; INFILTRATION at 22:26

## 2022-08-01 RX ADMIN — IPRATROPIUM BROMIDE 0.5 MG: 0.5 SOLUTION RESPIRATORY (INHALATION) at 13:58

## 2022-08-01 RX ADMIN — GUAIFENESIN 1200 MG: 600 TABLET ORAL at 21:26

## 2022-08-01 RX ADMIN — LEVALBUTEROL HYDROCHLORIDE 1.25 MG: 1.25 SOLUTION, CONCENTRATE RESPIRATORY (INHALATION) at 07:45

## 2022-08-01 RX ADMIN — INSULIN LISPRO 2 UNITS: 100 INJECTION, SOLUTION INTRAVENOUS; SUBCUTANEOUS at 12:52

## 2022-08-01 RX ADMIN — INSULIN LISPRO 2 UNITS: 100 INJECTION, SOLUTION INTRAVENOUS; SUBCUTANEOUS at 08:40

## 2022-08-01 RX ADMIN — LIDOCAINE 5% 2 PATCH: 700 PATCH TOPICAL at 08:11

## 2022-08-01 RX ADMIN — PANTOPRAZOLE SODIUM 40 MG: 40 TABLET, DELAYED RELEASE ORAL at 05:10

## 2022-08-01 RX ADMIN — BUMETANIDE 2 MG: 1 TABLET ORAL at 08:10

## 2022-08-01 RX ADMIN — ROPIVACAINE HYDROCHLORIDE: 2 INJECTION, SOLUTION EPIDURAL; INFILTRATION at 03:30

## 2022-08-01 RX ADMIN — SPIRONOLACTONE 100 MG: 100 TABLET ORAL at 08:11

## 2022-08-01 RX ADMIN — METOPROLOL SUCCINATE 50 MG: 50 TABLET, EXTENDED RELEASE ORAL at 05:10

## 2022-08-01 RX ADMIN — LEVALBUTEROL HYDROCHLORIDE 1.25 MG: 1.25 SOLUTION, CONCENTRATE RESPIRATORY (INHALATION) at 19:40

## 2022-08-01 RX ADMIN — VANCOMYCIN HYDROCHLORIDE 1250 MG: 5 INJECTION, POWDER, LYOPHILIZED, FOR SOLUTION INTRAVENOUS at 13:55

## 2022-08-01 RX ADMIN — METOPROLOL SUCCINATE 50 MG: 50 TABLET, EXTENDED RELEASE ORAL at 17:01

## 2022-08-01 NOTE — PROGRESS NOTES
Progress Note - Acute Pain Service    Caity Cochran 65 y o  female MRN: [id number]  Unit/Bed#: S -01 Encounter: [id number]      Assessment:   Principal Problem:    Empyema lung, Right  Active Problems:    Type 2 diabetes mellitus with hyperglycemia (HCC)    A-fib (HCC)    History of Cardiac arrest (HCC)    CAD (coronary artery disease)    Tracheostomy in place ([facility] [address] )    Anemia    Hyponatremia    Acute kidney injury superimposed on chronic kidney disease stage 3 ([facility])    Acute Respiratory insufficiency on chronic hypoxic respiratory failure    Persistent pain despite chest tube removal    Chest wall wound infection    Acute on chronic heart failure with preserved ejection fraction ([facility] [address] )    Kelli Barajas is a 65y o  year old female with PMHx of VT arrest leading to prolonged intubation/ICU stay (11/2021)  Subsequent subglottic stenosis requiring trach, DM type 2, CKD stage 3, who had a right PTX s/p chest tube placement on 7/20  She had a right ES plane block with exparel on 7/23 with limited analgesic benefit (6-8 hours)  The chest tube was subsequently removed on 7/24 but her hospital course has been c/b persistent right chest wall pain and drainage from chest tube removal site  CT revealed right-sided loculated pleural effusion/empyema s/p chest tube placement on 7/29  A right T2 NOELLE catheter was placed on 7/29 in anticipation for increased pain needs secondary to chest tube replacement  The patient continues to be in pain  She grades her current pain at a 6/10  She does not want any additional procedures  Despite her pain, she was able to sleep  She is hopeful that her upcoming chest x-ray will show clinical improvement  Plan:   1  I discussed with the patient that her T2 NOELLE catheter is unlikely to be providing her a lot of benefit given the location of her new chest tube  She declines any additional procedures at this point  She also would like it to remain in for the time being   I would discontinue the catheter tomorrow as that will be approximately 5 days  2  Continue other current therapies, although she hasn't used her IV dilaudid in a few days  I would discontinue this shortly after the chest tube is removed  APS will continue to follow; please contact APS ( [phone]) with any further questions    Pain History  Current pain location(s): right lower chest  Pain Scale:   6/10  Quality: sharp  24 hour history: no acute events    Opioid requirement previous 24 hours: 3 doses dilaudid 2 mg PO, 1 dose dilaudid 4 mg PO    Meds/Allergies   all current active meds have been reviewed    Allergies   Allergen Reactions    Metformin Diarrhea    Clonidine Rash     Patch        Objective     Temp:  [98 °F (36 7 °C)-99 °F (37 2 °C)] 99 °F (37 2 °C)  HR:  [59-77] 77  Resp:  [16-18] 16  BP: (116-135)/(68-74) 128/70  FiO2 (%):  [28] 28    Physical Exam  Constitutional:       Appearance: Normal appearance  HENT:      Head: Normocephalic and atraumatic  Mouth/Throat:      Mouth: Mucous membranes are moist    Neck:      Comments: Trach in situ, on trach collar   Cardiovascular:      Rate and Rhythm: Normal rate  Pulmonary:      Effort: Pulmonary effort is normal    Abdominal:      General: Abdomen is flat  Skin:     General: Skin is warm and dry  Neurological:      General: No focal deficit present  Mental Status: She is alert and oriented to person, place, and time  Mental status is at baseline  Psychiatric:         Mood and Affect: Mood normal          Behavior: Behavior normal          Thought Content:  Thought content normal          Judgment: Judgment normal          Lab Results:   Results from last 7 days   Lab Units 08/01/22 0457   WBC Thousand/uL 17 46*   HEMOGLOBIN g/dL 7 8*   HEMATOCRIT % 27 8*   PLATELETS Thousands/uL 611*      Results from last 7 days   Lab Units 08/01/22  0457 07/30/22  0459 07/29/22  0447   POTASSIUM mmol/L 4 1   < > 4 3   CHLORIDE mmol/L 99   < > 96   CO2 mmol/L 25   < > 26   BUN mg/dL 40*   < > 47*   CREATININE mg/dL 1 07   < > 1 33*   CALCIUM mg/dL 8 3*   < > 8 5   ALK PHOS U/L  --   --  91   ALT U/L  --   --  6*   AST U/L  --   --  9*    < > = values in this interval not displayed  Imaging Studies: I have personally reviewed pertinent reports  EKG, Pathology, and Other Studies: I have personally reviewed pertinent reports        José Miguel Garcia MD

## 2022-08-01 NOTE — ASSESSMENT & PLAN NOTE
· Patient noted on imaging to have small pneumothorax at [facility], subsequently transferred to [facility]  · Chest tube placed 7/20, then upsized on 7/22 given no significant change and appearance that it was kinked  · Chest x-ray performed 7/24 -no pneumothorax at that time    Chest tube was subsequently removed

## 2022-08-01 NOTE — PROGRESS NOTES
Vancomycin IV Pharmacy-to-Dose Consultation    Toño Gallegos is a 65 y o  female who is currently receiving Vancomycin IV with management by the Pharmacy Consult service  Assessment/Plan:    Vancomycin random level today was 15 5  Will administer a 15 mg/kg dose (1250 mg) now and check the random level again tomorrow with AM labs  Vancomycin will be re-dosed when level is less than 20  We will continue to follow the patient's culture results and clinical progress daily        Lorna Dodd, PharmD  Pharmacist

## 2022-08-01 NOTE — PROGRESS NOTES
[facility]  Progress Note - Alisha Russell 1965, 65 y o  female MRN: [id number]  Unit/Bed#: S -01 Encounter: [id number]  Primary Care Provider: Hazel Pierson MD   Date and time admitted to hospital: 7/19/2022 11:33 AM    * Empyema lung, Right  Assessment & Plan  · Loculated right pleural effusion  She is status post spontaneous right-sided pneumothorax with chest tube placement and subsequent removal  · Most recent CT of the chest on 7/28/22 revealed moderate partially loculated right pleural effusion  · Right chest tube re-placed on 7/29/22 by IR   · Cx tube output about 150 mL over last 24 hrs - 8/1  · Pleural fluid analysis shows neutrophil predominant WBC count  · Fluid cultures from prior anterior chest tube site positive for MRSA  · 8/1 - CXR: Small component of right pleural effusion suspected which may be partially loculated  Indwelling right thoracostomy tube without pneumothorax    Plan:  - Continue Abx with IV Cefepime and Vanc Day 6 per ID  - Continue Chest tube management per Pulmonology - recommendations appreciated  - Continue to wean O2 as tolerated to maintain SpO2 > 88%    Chest wall wound infection  Assessment & Plan  · Purulent discharge noted at site of recent chest tube placement  · Cultures positive for MRSA  · Currently on IV vancomycin  · Continue local wound care/dressing changes    Acute Respiratory insufficiency on chronic hypoxic respiratory failure  Assessment & Plan  · She is status post trach  Currently on 6 L with sats in the high 90s, at home uses 10 L  · Status post recent right pneumothorax requiring chest tube and now with right empyema  · Continue aggressive respiratory protocol, p r n   Suctioning  · Continue Xoponex and Atrovent per Pulm q6hrs  · Encourage out of bed, incentive spirometry  · Pulmonology following    Acute on chronic heart failure with preserved ejection fraction (HCC)  Assessment & Plan  Wt Readings from Last 3 Encounters:   08/01/22 79 5 kg (175 lb 4 3 oz)   07/19/22 78 2 kg (172 lb 6 4 oz)   06/29/22 81 7 kg (180 lb 3 2 oz)     · Most recent EF 50%  Currently stable and euvolemic  · On Bumex 2 mg twice daily with Aldactone 100 mg daily  Will continue  · Continue low-salt diet, monitor I's and O's  · She is status post Cardiology consultation this admission  · Will follow outpatient with Cardiology    Persistent pain despite chest tube removal  Assessment & Plan  · Continues with intractable pain at prior chest tube site on right anterior chest wall  Chest tube was taken out on 7/24/22  · Currently followed by acute pain service  She is status post ES plane block on 7/23/22 with limited analgesic effect lasting approximately 6-8 hours  · Currently on multimodal pain regimen with scheduled Tylenol, Lyrica, p r n  Valium, IV and p o  Dilaudid  · Added bowel regimen with senna/Colace b i d  And scheduled daily MiraLax on 8/1  · APS following  Appreciate input    Acute kidney injury superimposed on chronic kidney disease stage 3 [facility])  Assessment & Plan  Lab Results   Component Value Date    EGFR 58 08/01/2022    EGFR 55 07/31/2022    EGFR 51 07/30/2022    CREATININE 1 07 08/01/2022    CREATININE 1 12 07/31/2022    CREATININE 1 19 07/30/2022   · Creatinine elevated at 1 64 upon transfer on 7/19/22, with baseline being 0 9-1 0  · Resolved  · Continue to monitor closely  Losartan on hold    Hyponatremia  Assessment & Plan  · Corrected sodium for glucose is 135  · No additional workup at this time    Anemia  Assessment & Plan  · Acute on chronic anemia likely related to illness  No overt severe bleeding noted    Tracheostomy in place [facility])  Assessment & Plan  · Trach placed in the context of cardiac arrest/prolonged ventilator dependent state November 2021     · Decannulated at [facility] 12/11/21  · Patient requires frequent tracheostomy changes and suctioning  · Per discussion with speech therapy,  video barium swallow was done for sense of food getting stuck  Appropriate for regular diet  · On trach collar O2 with humidification      CAD (coronary artery disease)  Assessment & Plan  · STEMI 10/22/2021 s/p PATRICA mid circumflex OM1  OM2 was ballooned but not stented  · Pulseless VT 10/27/21 - repeat LHC 90% mid circumflex stenosis, but could not be intervened on  · VT 10/28/21 LHC:  found to have apical LAD complete occlusion was felt to be small to be intervened  · Cardiac carrest 1/1/22 - [facility] felt to be hypoxic vs  VT induced  · On beta-blocker, Brilinta and Lipitor      History of Cardiac arrest [facility])  Assessment & Plan  · STEMI 10/22/21 - PATRICA to mid circumflex  · VT arrest 10/26   · Polymorphic VT x 1 shock 10/28/21  · ICD not placed given risks felt to outweigh benefits with cognitive function and risk of infection at that that time  · Cardiac arrest 1/1/22 - likely VT related - went to [facility]  · No ICD given lung infection and on IV ABX x 4 weeks  · Canceled 2 EP appts since that time and thus no ICD in place - still wears LifeVest  · Most recent EF improved to 50%    A-fib [facility])  Assessment & Plan  · On anticoagulation with Eliquis   · Continue amiodarone, Toprol    Type 2 diabetes mellitus with hyperglycemia [facility])  Assessment & Plan  Lab Results   Component Value Date    HGBA1C 12 7 (H) 05/22/2022     Recent Labs     07/31/22  1753 07/31/22  2115 08/01/22  0715 08/01/22  1110   POCGLU 106 256* 165* 194*     Blood Sugar Average: Last 72 hrs:  · (P) 327 [id number]   · Very poorly controlled based on A1c  · Continue basal/bolus insulin to 45 units qHS and 22 units Humalog TID with meals today  · SSI for coverage as well     · ADA diet, Accu-Cheks a c  HS    Hypokalemia-resolved as of 7/27/2022  Assessment & Plan  · Potassium currently stable  · Goal for K >4 given history of VT cardiac arrest  · K 6 1 on 7/20/22, avoid overcorrection      Primary spontaneous pneumothorax-resolved as of 7/25/2022  Assessment & Plan  · Patient noted on imaging to have small pneumothorax at [facility], subsequently [facility]  · Chest tube placed 7/20, then upsized on 7/22 given no significant change and appearance that it was kinked  · Chest x-ray performed 7/24 -no pneumothorax at that time  Chest tube was subsequently removed      Acute on chronic HFrEF (heart failure with reduced ejection fraction) (HCC)-resolved as of 7/27/2022  Assessment & Plan  Wt Readings from Last 3 Encounters:   08/01/22 79 5 kg (175 lb 4 3 oz)   07/19/22 78 2 kg (172 lb 6 4 oz)   06/29/22 81 7 kg (180 lb 3 2 oz)   · EF noted to be 50% on last echocardiogram 2/22  · [place] [facility] [place] on 7/15 - given 80 mg IV lasix and then placed back on bumex 2 mg PO BID  Developed MODESTO and thus bumex dose reduced - got 2 mg on 7/19, no bumex on on 7/20, 1 mg on 7/21, 2 mg on 7/23, 3 mg on 7/23 (home dose had been bumex 2 mg PO BID)  7/24 AM with increasing SOB, oxygen requiements - given 1 mg IV bumex  · CXR showed mild vascular congestion  Suspected iatrogenic volume overload in the setting of reduced/withheld diuretic dosing and received several additional doses of IV Bumex, subsequently held additional doses of diuretics  · Consulted cardiology, appreciate their input  · Continue oral Bumex - re-started on 7/29          VTE Pharmacologic Prophylaxis: VTE Score: 3 Moderate Risk (Score 3-4) - Pharmacological DVT Prophylaxis Ordered: apixaban (Eliquis)  Patient Centered Rounds: I performed bedside rounds with nursing staff today  Discussions with Specialists or Other Care Team Provider: Pulm, ID, Anesthesia    Education and Discussions with Family / Patient: Updated  (son) via phone  Time Spent for Care: 45 minutes  More than 50% of total time spent on counseling and coordination of care as described above      Current Length of Stay: 13 day(s)  Current Patient Status: Inpatient   Certification Statement: The patient will continue to require additional inpatient hospital stay due to Empyema  Discharge Plan: Anticipate discharge in 48-72 hrs to discharge location to be determined pending rehab evaluations  Code Status: Level 1 - Full Code    Subjective:   Pt seen and examined today continues to have some pain around chest tube site  She also notes she has not had BM since Saturday  She states otherwise feeling a bit better then previously  No sob, no nausea, vomiting, no fevers, chills  No other complaints today  Objective:     Vitals:   Temp (24hrs), Av 6 °F (37 °C), Min:98 °F (36 7 °C), Max:99 2 °F (37 3 °C)    Temp:  [98 °F (36 7 °C)-99 2 °F (37 3 °C)] 99 2 °F (37 3 °C)  HR:  [59-77] 70  Resp:  [16-18] 18  BP: (112-135)/(68-74) 112/68  SpO2:  [90 %-99 %] 90 %  Body mass index is 25 88 kg/m²  Input and Output Summary (last 24 hours): Intake/Output Summary (Last 24 hours) at 2022 1210  Last data filed at 2022 1102  Gross per 24 hour   Intake 456 85 ml   Output 1650 ml   Net -1193 15 ml       Physical Exam:   Physical Exam  Vitals and nursing note reviewed  Constitutional:       General: She is not in acute distress  Comments: Trach   HENT:      Head: Normocephalic and atraumatic  Right Ear: External ear normal       Left Ear: External ear normal       Nose: Nose normal  No congestion  Mouth/Throat:      Mouth: Mucous membranes are moist       Pharynx: No oropharyngeal exudate  Eyes:      Extraocular Movements: Extraocular movements intact  Conjunctiva/sclera: Conjunctivae normal       Pupils: Pupils are equal, round, and reactive to light  Neck:      Comments: Trach without an evidence of infection  Cardiovascular:      Rate and Rhythm: Normal rate and regular rhythm  Pulses: Normal pulses  Heart sounds: Murmur heard  Pulmonary:      Effort: Pulmonary effort is normal       Breath sounds: Rhonchi present  No wheezing or rales     Abdominal: General: Bowel sounds are normal       Tenderness: There is no abdominal tenderness  There is no guarding or rebound  Musculoskeletal:         General: Normal range of motion  Cervical back: Normal range of motion  Right lower leg: No edema  Left lower leg: No edema  Skin:     General: Skin is warm  Capillary Refill: Capillary refill takes less than 2 seconds  Findings: No rash  Comments: Chest wall wound, small amount of purulence noted  NO surrounding erythema, slight tender  Neurological:      General: No focal deficit present  Mental Status: She is alert and oriented to person, place, and time  Psychiatric:         Mood and Affect: Mood normal          Behavior: Behavior normal           Additional Data:     Labs:  Results from last 7 days   Lab Units 08/01/22 0457 07/31/22  0443   WBC Thousand/uL 17 46* 16 13*   HEMOGLOBIN g/dL 7 8* 8 4*   HEMATOCRIT % 27 8* 28 1*   PLATELETS Thousands/uL 611* 617*   NEUTROS PCT %  --  79*   LYMPHS PCT %  --  8*   LYMPHO PCT % 5*  --    MONOS PCT %  --  10   MONO PCT % 5  --    EOS PCT % 2 1     Results from last 7 days   Lab Units 08/01/22 0457 07/30/22  0459 07/29/22  0447   SODIUM mmol/L 134*   < > 131*   POTASSIUM mmol/L 4 1   < > 4 3   CHLORIDE mmol/L 99   < > 96   CO2 mmol/L 25   < > 26   BUN mg/dL 40*   < > 47*   CREATININE mg/dL 1 07   < > 1 33*   ANION GAP mmol/L 10   < > 9   CALCIUM mg/dL 8 3*   < > 8 5   ALBUMIN g/dL  --   --  2 6*   TOTAL BILIRUBIN mg/dL  --   --  0 55   ALK PHOS U/L  --   --  91   ALT U/L  --   --  6*   AST U/L  --   --  9*   GLUCOSE RANDOM mg/dL 125   < > 99    < > = values in this interval not displayed           Results from last 7 days   Lab Units 08/01/22  1110 08/01/22  0715 07/31/22  2115 07/31/22  1753 07/31/22  1621 07/31/22  1108 07/31/22  0739 07/30/22  2108 07/30/22  1657 07/30/22  1209 07/30/22  0747 07/29/22  2051   POC GLUCOSE mg/dl 194* 165* 256* 106 80 247* 266* 248* 184* 215* 317* 336* Results from last 7 days   Lab Units 07/29/22  0447 07/27/22  0619 07/26/22  0507   PROCALCITONIN ng/ml 0 77* 0 68* 0 96*       Lines/Drains:  Invasive Devices  Report    Peripheral Intravenous Line  Duration           Peripheral IV 08/01/22 Right;Upper;Ventral (anterior) Arm <1 day          Epidural Line  Duration           Nerve Block Catheter 07/29/22 3 days          Drain  Duration           Chest Tube 1 Right Posterior 12 Fr  2 days          Airway  Duration           Surgical Airway Shiley Fenestrated 152 days                  Telemetry:  Telemetry Orders (From admission, onward)             LifeVest Patient: Continuous Telemetry Monitoring during hospitalization (non-expiring)  Continuous LifeVest Telemetry Monitoring        References:    LifeVest Policy                 Telemetry Reviewed: Normal Sinus Rhythm  Indication for Continued Telemetry Use: Arrthymias requiring medical therapy             Imaging: Reviewed radiology reports from this admission including: chest xray    Recent Cultures (last 7 days):   Results from last 7 days   Lab Units 07/29/22  1529 07/28/22  1201 07/26/22  1349   SPUTUM CULTURE   --   --  2+ Growth of Staphylococcus aureus*  1+ Growth of Pseudomonas aeruginosa*  1+ Growth of    GRAM STAIN RESULT  1+ Polys  No organisms seen 3+ Polys*  2+ Gram positive cocci in pairs* 1+ Epithelial cells per low power field*  3+ Polys*  1+ Gram positive cocci in pairs*  Rare Gram positive rods*   WOUND CULTURE   --  2+ Growth of Methicillin Resistant Staphylococcus aureus*  --    BODY FLUID CULTURE, STERILE  No growth  --   --        Last 24 Hours Medication List:   Current Facility-Administered Medications   Medication Dose Route Frequency Provider Last Rate    acetaminophen  975 mg Oral Q8H [address] Leda Chand PA-C      albuterol  2 5 mg Nebulization Q4H PRN Leda Chand PA-C      amiodarone  100 mg Oral Daily With Breakfast Leda Chand PA-C      apixaban  5 mg Oral BID Blanca Felix PA-C      atorvastatin  40 mg Oral Daily With [employer]D      benzonatate  100 mg Oral TID PRN Edel Whelan MD      bumetanide  2 mg Oral BID NEELAM Zaldivar      cefepime  2,000 mg Intravenous Q12H Megan Martinez DO Stopped (08/01/22 1102)    diazepam  5 mg Oral Q6H PRN Etta Nelson PA-C      escitalopram  10 mg Oral Daily Leda Chand PA-C      ferrous sulfate  325 mg Oral Daily With Breakfast Leda Chand PA-C      guaiFENesin  1,200 mg Oral Q12H [address] Leda Chand PA-C      HYDROmorphone  0 5 mg Intravenous Q2H PRN Jagdish Abrams MD      HYDROmorphone  2 mg Oral Q4H PRN Branidnana Bobo PA-C      HYDROmorphone  4 mg Oral Q4H PRN Etta Nelson PA-C      insulin glargine  45 Units Subcutaneous HS Treasure Pugh MD      insulin lispro  2-12 Units Subcutaneous TID AC Leda Chand PA-C      insulin lispro  2-12 Units Subcutaneous HS Leda Chand PA-C      insulin lispro  22 Units Subcutaneous TID With Meals Treasure Pugh MD      ipratropium  0 5 mg Nebulization TID Blanca Felix PA-C      levalbuterol  1 25 mg Nebulization TID Blanca Felix PA-C      lidocaine  2 patch Topical Daily Leda Chand PA-C      metoprolol succinate  50 mg Oral Q12H NEELAM Zaldivar      pantoprazole  40 mg Oral Early Morning Leda Chand PA-C      polyethylene glycol  17 g Oral Daily Pam Mahajan DO      pregabalin  25 mg Oral QPM Leda Chand PA-C      pregabalin  75 mg Oral Daily Leda Chand PA-C      ropivacaine 0 2%   Epidural Continuous Bright Zena Guerra MD      senna-docusate sodium  1 tablet Oral BID Reinier Schwarz MD      spironolactone  100 mg Oral Daily Leda Chand PA-C      ticagrelor  90 mg Oral Q12H Leda Stoudt, PA-C      trimethobenzamide  200 mg Intramuscular Q6H PRN Graeme Barrera PA-C      vancomycin  15 mg/kg Intravenous Daily PRN Blanca Felix PA-C          Today, Patient Was Seen By: Paulette Dunlap DO    **Please Note: This note may have been constructed using a voice recognition system  **

## 2022-08-01 NOTE — ASSESSMENT & PLAN NOTE
Wt Readings from Last 3 Encounters:   08/01/22 79 5 kg (175 lb 4 3 oz)   07/19/22 78 2 kg (172 lb 6 4 oz)   06/29/22 81 7 kg (180 lb 3 2 oz)   · EF noted to be 50% on last echocardiogram 2/22  · [place] [facility] [place]ac on 7/15 - given 80 mg IV lasix and then placed back on bumex 2 mg PO BID  Developed MODESTO and thus bumex dose reduced - got 2 mg on 7/19, no bumex on on 7/20, 1 mg on 7/21, 2 mg on 7/23, 3 mg on 7/23 (home dose had been bumex 2 mg PO BID)  7/24 AM with increasing SOB, oxygen requiements - given 1 mg IV bumex  · CXR showed mild vascular congestion  Suspected iatrogenic volume overload in the setting of reduced/withheld diuretic dosing and received several additional doses of IV Bumex, subsequently held additional doses of diuretics  · Consulted cardiology, appreciate their input      · Continue oral Bumex - re-started on 7/29

## 2022-08-01 NOTE — ASSESSMENT & PLAN NOTE
· Loculated right pleural effusion  She is status post spontaneous right-sided pneumothorax with chest tube placement and subsequent removal  · Most recent CT of the chest on 7/28/22 revealed moderate partially loculated right pleural effusion  · Right chest tube re-placed on 7/29/22 by IR   · Cx tube output about 150 mL over last 24 hrs - 8/1  · Pleural fluid analysis shows neutrophil predominant WBC count  · Fluid cultures from prior anterior chest tube site positive for MRSA  · 8/1 - CXR: Small component of right pleural effusion suspected which may be partially loculated    Indwelling right thoracostomy tube without pneumothorax    Plan:  - Continue Abx with IV Cefepime and Vanc Day 6 per ID  - Continue Chest tube management per Pulmonology - recommendations appreciated  - Continue to wean O2 as tolerated to maintain SpO2 > 88%

## 2022-08-01 NOTE — ASSESSMENT & PLAN NOTE
· STEMI 10/22/21 - PATRICA to mid circumflex  · VT arrest 10/26   · Polymorphic VT x 1 shock 10/28/21  · ICD not placed given risks felt to outweigh benefits with cognitive function and risk of infection at that that time  · Cardiac arrest 1/1/22 - likely VT related - went to [facility]  · No ICD given lung infection and on IV ABX x 4 weeks  · Canceled 2 EP appts since that time and thus no ICD in place - still wears LifeVest  · Most recent EF improved to 50%

## 2022-08-01 NOTE — ASSESSMENT & PLAN NOTE
· She is status post trach  Currently on 6 L with sats in the high 90s, at home uses 10 L  · Status post recent right pneumothorax requiring chest tube and now with right empyema  · Continue aggressive respiratory protocol, p r n   Suctioning  · Continue Xoponex and Atrovent per Pulm q6hrs  · Encourage out of bed, incentive spirometry  · Pulmonology following

## 2022-08-01 NOTE — ASSESSMENT & PLAN NOTE
· Trach placed in the context of cardiac arrest/prolonged ventilator dependent state November 2021  · Decannulated at [facility] 12/11/21  · Patient requires frequent tracheostomy changes and suctioning  · Per discussion with speech therapy,  video barium swallow was done for sense of food getting stuck   Appropriate for regular diet  · On trach collar O2 with humidification

## 2022-08-01 NOTE — PROGRESS NOTES
Progress Note - Infectious Disease   Kelli Red 65 y o  female MRN: [id number]  Unit/Bed#: S -01 Encounter: [id number]      Impression/Recommendations:  1  Probable right-sided empyema  CT show loculated right pleural effusion  Patient is status post placement of chest tube on 07/29, with highly elevated pleural fluid WBC with neutrophil predominance  Culture has no growth, likely due to prior antibiotic  Given MRSA in right chest wound, MRSA is likely pathogen in empyema  Continue IV vancomycin  No further need for cefepime  Monitor temperature/WBC  Continue chest tube drainage  Treat x3 weeks total, through 8/16  2  Developing sepsis, with leukocytosis and tachypnea, likely secondary to empyema above  Tachypnea has improved WBC still elevated  Fortunately, patient remains systemically well, without toxicity and he most stable, without hypotension  Antibiotic plan as in below  Monitor temperature/WBC  Monitor hemodynamics  3  MRSA right chest wall infection, as site recent chest tube placement for spontaneous pneumothorax  Antibiotic plan as in above  Serial exams  4  Acute on chronic hypoxic respiratory failure, likely multifactorial   Respiratory culture with MRSA and Pseudomonas but no obvious pneumonia  These isolates may be airway colonization  Management per primary service  5  Recent spontaneous pneumothorax, status post chest tube placement on 07/20  This was removed on 07/24  6  DM, type 2, poorly controlled, with hyperglycemia and elevated hemoglobin A1c  This is risk factor for infection above  Management per primary service  7  MODESTO  Creatinine is much improved  Antibiotic at full dose  Monitor creatinine  Discussed with patient in detail regarding the above plan  Antibiotics:  Vancomycin/cefepime # 6    Subjective:  Patient with mild chest pain at chest tube insertion site  Dyspnea mild and stable    Cough mild stable, nonproductive  Temperature stays down  No chills  She is tolerating antibiotics well  No nausea, vomiting or diarrhea  Objective:  Vitals:  Temp:  [98 °F (36 7 °C)-99 2 °F (37 3 °C)] 99 2 °F (37 3 °C)  HR:  [59-77] 70  Resp:  [16-18] 18  BP: (112-135)/(68-74) 112/68  SpO2:  [90 %-99 %] 90 %  Temp (24hrs), Av 6 °F (37 °C), Min:98 °F (36 7 °C), Max:99 2 °F (37 3 °C)  Current: Temperature: 99 2 °F (37 3 °C)    Physical Exam:     General: Awake, alert, cooperative, no distress  Neck:  Supple  No mass  No lymphadenopathy  Lungs: Decreased breath sounds on right, right basilar rales, no wheezing, respirations unlabored  Heart:  Regular rate and rhythm, S1 and S2 normal, no murmur  Abdomen: Soft, nondistended, non-tender, bowel sounds active all four quadrants, no masses, no organomegaly  Extremities: Trace leg edema  No erythema/warmth  No draining ulcer  Nontender to palpation  Skin:  No rash  Neuro: Moves all extremities  Invasive Devices  Report    Peripheral Intravenous Line  Duration           Peripheral IV 22 Right;Upper;Ventral (anterior) Arm <1 day          Epidural Line  Duration           Nerve Block Catheter 22 3 days          Drain  Duration           Chest Tube 1 Right Posterior 12 Fr  2 days          Airway  Duration           Surgical Airway Shiley Fenestrated 152 days                Labs studies:   I have personally reviewed pertinent labs    Results from last 7 days   Lab Units 22  044   POTASSIUM mmol/L 4 1 4 2 4 4 4 3   CHLORIDE mmol/L 99 100 98 96   CO2 mmol/L 25 21 24 26   BUN mg/dL 40* 49* 50* 47*   CREATININE mg/dL 1 07 1 12 1 19 1 33*   EGFR ml/min/1 73sq m 58 55 51 44   CALCIUM mg/dL 8 3* 8 1* 8 1* 8 5   AST U/L  --   --   --  9*   ALT U/L  --   --   --  6*   ALK PHOS U/L  --   --   --  91     Results from last 7 days   Lab Units 2230/22  0459   WBC Thousand/uL 17 46* 16 13* 18 08*   HEMOGLOBIN g/dL 7 8* 8 4* 7 6*   PLATELETS Thousands/uL 611* 617* 583*     Results from last 7 days   Lab Units 07/29/22  1529 07/28/22  1201 07/26/22  1349 07/26/22  1109   SPUTUM CULTURE   --   --  2+ Growth of Staphylococcus aureus*  1+ Growth of Pseudomonas aeruginosa*  1+ Growth of   --    GRAM STAIN RESULT  1+ Polys  No organisms seen 3+ Polys*  2+ Gram positive cocci in pairs* 1+ Epithelial cells per low power field*  3+ Polys*  1+ Gram positive cocci in pairs*  Rare Gram positive rods*  --    WOUND CULTURE   --  2+ Growth of Methicillin Resistant Staphylococcus aureus*  --   --    BODY FLUID CULTURE, STERILE  No growth  --   --   --    MRSA CULTURE ONLY   --   --   --  No Methicillin Resistant Staphlyococcus aureus (MRSA) isolated       Imaging Studies:   I have personally reviewed pertinent imaging study reports and images in PACS  EKG, Pathology, and Other Studies:   I have personally reviewed pertinent reports  <<----- Click to add NO significant Past Surgical History

## 2022-08-01 NOTE — ASSESSMENT & PLAN NOTE
Wt Readings from Last 3 Encounters:   08/01/22 79 5 kg (175 lb 4 3 oz)   07/19/22 78 2 kg (172 lb 6 4 oz)   06/29/22 81 7 kg (180 lb 3 2 oz)     · Most recent EF 50%  Currently stable and euvolemic  · On Bumex 2 mg twice daily with Aldactone 100 mg daily    Will continue  · Continue low-salt diet, monitor I's and O's  · She is status post Cardiology consultation this admission  · Will follow outpatient with Cardiology

## 2022-08-01 NOTE — ASSESSMENT & PLAN NOTE
Lab Results   Component Value Date    EGFR 58 08/01/2022    EGFR 55 07/31/2022    EGFR 51 07/30/2022    CREATININE 1 07 08/01/2022    CREATININE 1 12 07/31/2022    CREATININE 1 19 07/30/2022   · Creatinine elevated at 1 64 upon transfer on 7/19/22, with baseline being 0 9-1 0  · Resolved  · Continue to monitor closely    Losartan on hold

## 2022-08-01 NOTE — PROGRESS NOTES
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  4  Acute on chronic diastolic CHF and pulmonary hypertension  5  Suspected COPD of unknown severity without acute exacerbation   Trach dependent; Wean oxygen via trach collar as tolerated    Continue Xopenex and Atrovent every Q 6 hours   TPA and dornase held today due to bloody drainage from chest tube   Continue cefepime and vancomycin  Id following   Consider consult to thoracic surgery    Subjective:    Patient reports mild improvement of shortness of breath  Complains of some pain at site of chest tube  Patient denies chest pain, abdominal pain, nausea, vomiting, fever, chills    Objective:    Vitals: Blood pressure 119/69, pulse 76, temperature 99 4 °F (37 4 °C), resp  rate 18, weight 79 5 kg (175 lb 4 3 oz), SpO2 97 %  6 L via trach collar,Body mass index is 25 88 kg/m²  Intake/Output Summary (Last 24 hours) at 8/1/2022 1949  Last data filed at 8/1/2022 1736  Gross per 24 hour   Intake 395 05 ml   Output 4200 ml   Net -3804 95 ml       Invasive Devices  Report    Peripheral Intravenous Line  Duration           Peripheral IV 08/01/22 Left Antecubital <1 day          Epidural Line  Duration           Nerve Block Catheter 07/29/22 3 days          Drain  Duration           Chest Tube 1 Right Posterior 12 Fr  3 days          Airway  Duration           Surgical Airway Shiley Fenestrated 152 days              Physical Exam:     Physical Exam  Constitutional:       General: She is awake  She is not in acute distress  HENT:      Head: Normocephalic and atraumatic  Eyes:      General:         Right eye: No discharge  Left eye: No discharge        Extraocular Movements: Extraocular movements intact  Neck:      Comments: Tracheostomy in place  Cardiovascular:      Rate and Rhythm: Normal rate and regular rhythm  Pulses: Normal pulses  Heart sounds: Normal heart sounds  No murmur heard  Pulmonary:      Effort: Pulmonary effort is normal    Chest:      Comments: Right-sided chest tube in place draining bloody fluid  Abdominal:      General: There is no distension  Tenderness: There is no abdominal tenderness  There is no guarding or rebound  Musculoskeletal:      Right lower leg: No edema  Left lower leg: No edema  Skin:     General: Skin is warm and dry  Neurological:      General: No focal deficit present  Mental Status: She is alert  Psychiatric:         Mood and Affect: Mood normal          Behavior: Behavior normal          Labs: I have personally reviewed pertinent lab results  Imaging and other studies: I have personally reviewed pertinent reports     and I have personally reviewed pertinent films in PACS

## 2022-08-01 NOTE — ASSESSMENT & PLAN NOTE
· Continues with intractable pain at prior chest tube site on right anterior chest wall  Chest tube was taken out on 7/24/22  · Currently followed by acute pain service  She is status post ES plane block on 7/23/22 with limited analgesic effect lasting approximately 6-8 hours  · Currently on multimodal pain regimen with scheduled Tylenol, Lyrica, p r n  Valium, IV and p o  Dilaudid  · Added bowel regimen with senna/Colace b i d  And scheduled daily MiraLax on 8/1  · APS following    Appreciate input

## 2022-08-01 NOTE — ASSESSMENT & PLAN NOTE
Lab Results   Component Value Date    HGBA1C 12 7 (H) 05/22/2022     Recent Labs     07/31/22  1753 07/31/22  2115 08/01/22  0715 08/01/22  1110   POCGLU 106 256* 165* 194*     Blood Sugar Average: Last 72 hrs:  · (P) 608 [id number]   · Very poorly controlled based on A1c  · Continue basal/bolus insulin to 45 units qHS and 22 units Humalog TID with meals today  · SSI for coverage as well     · ADA diet, Accu-Cheks a c  HS

## 2022-08-01 NOTE — ASSESSMENT & PLAN NOTE
· STEMI 10/22/2021 s/p PATRICA mid circumflex OM1  OM2 was ballooned but not stented  · Pulseless VT 10/27/21 - repeat LHC 90% mid circumflex stenosis, but could not be intervened on  · VT 10/28/21 LHC:  found to have apical LAD complete occlusion was felt to be small to be intervened    · Cardiac carrest 1/1/22 - [facility] felt to be hypoxic vs  VT induced  · On beta-blocker, Brilinta and Lipitor

## 2022-08-02 ENCOUNTER — APPOINTMENT (INPATIENT)
Dept: CT IMAGING | Facility: HOSPITAL | Age: 57
DRG: 199 | End: 2022-08-02
Payer: COMMERCIAL

## 2022-08-02 ENCOUNTER — HOSPITAL ENCOUNTER (INPATIENT)
Facility: HOSPITAL | Age: 57
LOS: 16 days | Discharge: NURSING, REHAB OR LONG-TERM CARE | DRG: 853 | End: 2022-08-18
Attending: FAMILY MEDICINE | Admitting: FAMILY MEDICINE
Payer: COMMERCIAL

## 2022-08-02 VITALS
RESPIRATION RATE: 17 BRPM | TEMPERATURE: 97.5 F | OXYGEN SATURATION: 94 % | HEART RATE: 67 BPM | BODY MASS INDEX: 25.31 KG/M2 | SYSTOLIC BLOOD PRESSURE: 117 MMHG | DIASTOLIC BLOOD PRESSURE: 70 MMHG | WEIGHT: 171.4 LBS

## 2022-08-02 DIAGNOSIS — R05.9 COUGH: ICD-10-CM

## 2022-08-02 DIAGNOSIS — Z93.0 TRACHEOSTOMY IN PLACE (HCC): Primary | ICD-10-CM

## 2022-08-02 DIAGNOSIS — R77.8 ELEVATED TROPONIN: ICD-10-CM

## 2022-08-02 DIAGNOSIS — E78.5 HYPERLIPIDEMIA ASSOCIATED WITH TYPE 2 DIABETES MELLITUS (HCC): Chronic | ICD-10-CM

## 2022-08-02 DIAGNOSIS — I95.9 HYPOTENSION: ICD-10-CM

## 2022-08-02 DIAGNOSIS — M54.32 SCIATICA OF LEFT SIDE: ICD-10-CM

## 2022-08-02 DIAGNOSIS — E11.69 HYPERLIPIDEMIA ASSOCIATED WITH TYPE 2 DIABETES MELLITUS (HCC): Chronic | ICD-10-CM

## 2022-08-02 DIAGNOSIS — M79.7 FIBROMYALGIA: ICD-10-CM

## 2022-08-02 DIAGNOSIS — N18.31 STAGE 3A CHRONIC KIDNEY DISEASE (HCC): ICD-10-CM

## 2022-08-02 DIAGNOSIS — L08.9 WOUND INFECTION: ICD-10-CM

## 2022-08-02 DIAGNOSIS — T14.8XXA WOUND INFECTION: ICD-10-CM

## 2022-08-02 DIAGNOSIS — I48.0 PAROXYSMAL ATRIAL FIBRILLATION (HCC): ICD-10-CM

## 2022-08-02 DIAGNOSIS — J86.9 EMPYEMA LUNG (HCC): ICD-10-CM

## 2022-08-02 DIAGNOSIS — F51.05 INSOMNIA SECONDARY TO ANXIETY: ICD-10-CM

## 2022-08-02 DIAGNOSIS — I25.10 CORONARY ARTERY DISEASE INVOLVING NATIVE CORONARY ARTERY OF NATIVE HEART WITHOUT ANGINA PECTORIS: ICD-10-CM

## 2022-08-02 DIAGNOSIS — E11.65 TYPE 2 DIABETES MELLITUS WITH HYPERGLYCEMIA (HCC): ICD-10-CM

## 2022-08-02 DIAGNOSIS — I50.33 ACUTE ON CHRONIC HEART FAILURE WITH PRESERVED EJECTION FRACTION (HCC): ICD-10-CM

## 2022-08-02 DIAGNOSIS — R06.89 RESPIRATORY INSUFFICIENCY: ICD-10-CM

## 2022-08-02 DIAGNOSIS — D64.9 ANEMIA: ICD-10-CM

## 2022-08-02 DIAGNOSIS — R52 PAIN: ICD-10-CM

## 2022-08-02 DIAGNOSIS — F41.9 INSOMNIA SECONDARY TO ANXIETY: ICD-10-CM

## 2022-08-02 DIAGNOSIS — I95.89 HYPOTENSION DUE TO HYPOVOLEMIA: ICD-10-CM

## 2022-08-02 DIAGNOSIS — R26.9 GAIT DISORDER: ICD-10-CM

## 2022-08-02 DIAGNOSIS — N17.9 AKI (ACUTE KIDNEY INJURY) (HCC): ICD-10-CM

## 2022-08-02 DIAGNOSIS — E86.1 HYPOTENSION DUE TO HYPOVOLEMIA: ICD-10-CM

## 2022-08-02 PROBLEM — N18.30 CKD (CHRONIC KIDNEY DISEASE) STAGE 3, GFR 30-59 ML/MIN (HCC): Status: ACTIVE | Noted: 2022-07-19

## 2022-08-02 PROBLEM — U07.1 COVID-19: Status: RESOLVED | Noted: 2022-05-19 | Resolved: 2022-08-02

## 2022-08-02 LAB
ALBUMIN SERPL BCP-MCNC: 2.7 G/DL (ref 3.5–5)
ALP SERPL-CCNC: 75 U/L (ref 34–104)
ALT SERPL W P-5'-P-CCNC: 7 U/L (ref 7–52)
ANION GAP SERPL CALCULATED.3IONS-SCNC: 7 MMOL/L (ref 4–13)
ANISOCYTOSIS BLD QL SMEAR: PRESENT
AST SERPL W P-5'-P-CCNC: 16 U/L (ref 13–39)
BASOPHILS # BLD MANUAL: 0 THOUSAND/UL (ref 0–0.1)
BASOPHILS NFR MAR MANUAL: 0 % (ref 0–1)
BILIRUB SERPL-MCNC: 0.35 MG/DL (ref 0.2–1)
BUN SERPL-MCNC: 35 MG/DL (ref 5–25)
CALCIUM ALBUM COR SERPL-MCNC: 9.5 MG/DL (ref 8.3–10.1)
CALCIUM SERPL-MCNC: 8.5 MG/DL (ref 8.4–10.2)
CHLORIDE SERPL-SCNC: 98 MMOL/L (ref 96–108)
CO2 SERPL-SCNC: 27 MMOL/L (ref 21–32)
CREAT SERPL-MCNC: 1.14 MG/DL (ref 0.6–1.3)
EOSINOPHIL # BLD MANUAL: 0.29 THOUSAND/UL (ref 0–0.4)
EOSINOPHIL NFR BLD MANUAL: 2 % (ref 0–6)
ERYTHROCYTE [DISTWIDTH] IN BLOOD BY AUTOMATED COUNT: 17.9 % (ref 11.6–15.1)
GFR SERPL CREATININE-BSD FRML MDRD: 53 ML/MIN/1.73SQ M
GLUCOSE SERPL-MCNC: 105 MG/DL (ref 65–140)
GLUCOSE SERPL-MCNC: 138 MG/DL (ref 65–140)
GLUCOSE SERPL-MCNC: 158 MG/DL (ref 65–140)
GLUCOSE SERPL-MCNC: 160 MG/DL (ref 65–140)
GLUCOSE SERPL-MCNC: 207 MG/DL (ref 65–140)
HCT VFR BLD AUTO: 27.4 % (ref 34.8–46.1)
HGB BLD-MCNC: 8.2 G/DL (ref 11.5–15.4)
HYPERCHROMIA BLD QL SMEAR: PRESENT
LYMPHOCYTES # BLD AUTO: 0.57 THOUSAND/UL (ref 0.6–4.47)
LYMPHOCYTES # BLD AUTO: 4 % (ref 14–44)
MCH RBC QN AUTO: 22.7 PG (ref 26.8–34.3)
MCHC RBC AUTO-ENTMCNC: 29.9 G/DL (ref 31.4–37.4)
MCV RBC AUTO: 76 FL (ref 82–98)
METAMYELOCYTES NFR BLD MANUAL: 1 % (ref 0–1)
MONOCYTES # BLD AUTO: 1.14 THOUSAND/UL (ref 0–1.22)
MONOCYTES NFR BLD: 8 % (ref 4–12)
MYELOCYTES NFR BLD MANUAL: 1 % (ref 0–1)
NEUTROPHILS # BLD MANUAL: 12 THOUSAND/UL (ref 1.85–7.62)
NEUTS SEG NFR BLD AUTO: 84 % (ref 43–75)
PLATELET # BLD AUTO: 625 THOUSANDS/UL (ref 149–390)
PLATELET BLD QL SMEAR: ABNORMAL
PMV BLD AUTO: 9.8 FL (ref 8.9–12.7)
POIKILOCYTOSIS BLD QL SMEAR: PRESENT
POTASSIUM SERPL-SCNC: 3.8 MMOL/L (ref 3.5–5.3)
PROT SERPL-MCNC: 7.8 G/DL (ref 6.4–8.4)
RBC # BLD AUTO: 3.61 MILLION/UL (ref 3.81–5.12)
RBC MORPH BLD: PRESENT
SODIUM SERPL-SCNC: 132 MMOL/L (ref 135–147)
VANCOMYCIN SERPL-MCNC: 18.8 UG/ML (ref 10–20)
WBC # BLD AUTO: 14.29 THOUSAND/UL (ref 4.31–10.16)

## 2022-08-02 PROCEDURE — 94760 N-INVAS EAR/PLS OXIMETRY 1: CPT

## 2022-08-02 PROCEDURE — 82948 REAGENT STRIP/BLOOD GLUCOSE: CPT

## 2022-08-02 PROCEDURE — 99233 SBSQ HOSP IP/OBS HIGH 50: CPT | Performed by: INTERNAL MEDICINE

## 2022-08-02 PROCEDURE — 94640 AIRWAY INHALATION TREATMENT: CPT

## 2022-08-02 PROCEDURE — 85027 COMPLETE CBC AUTOMATED: CPT | Performed by: FAMILY MEDICINE

## 2022-08-02 PROCEDURE — G1004 CDSM NDSC: HCPCS

## 2022-08-02 PROCEDURE — 80053 COMPREHEN METABOLIC PANEL: CPT | Performed by: FAMILY MEDICINE

## 2022-08-02 PROCEDURE — 99232 SBSQ HOSP IP/OBS MODERATE 35: CPT | Performed by: INTERNAL MEDICINE

## 2022-08-02 PROCEDURE — 71250 CT THORAX DX C-: CPT

## 2022-08-02 PROCEDURE — 94664 DEMO&/EVAL PT USE INHALER: CPT

## 2022-08-02 PROCEDURE — 99232 SBSQ HOSP IP/OBS MODERATE 35: CPT | Performed by: ANESTHESIOLOGY

## 2022-08-02 PROCEDURE — 85007 BL SMEAR W/DIFF WBC COUNT: CPT | Performed by: FAMILY MEDICINE

## 2022-08-02 PROCEDURE — 80202 ASSAY OF VANCOMYCIN: CPT | Performed by: INTERNAL MEDICINE

## 2022-08-02 PROCEDURE — 99239 HOSP IP/OBS DSCHRG MGMT >30: CPT | Performed by: INTERNAL MEDICINE

## 2022-08-02 RX ORDER — DIAZEPAM 5 MG/1
5 TABLET ORAL EVERY 6 HOURS PRN
Status: DISCONTINUED | OUTPATIENT
Start: 2022-08-02 | End: 2022-08-18 | Stop reason: HOSPADM

## 2022-08-02 RX ORDER — HYDROMORPHONE HCL/PF 1 MG/ML
0.5 SYRINGE (ML) INJECTION EVERY 2 HOUR PRN
Status: DISCONTINUED | OUTPATIENT
Start: 2022-08-02 | End: 2022-08-18 | Stop reason: HOSPADM

## 2022-08-02 RX ORDER — SPIRONOLACTONE 100 MG/1
100 TABLET, FILM COATED ORAL DAILY
Status: CANCELLED | OUTPATIENT
Start: 2022-08-03

## 2022-08-02 RX ORDER — INSULIN GLARGINE 100 [IU]/ML
45 INJECTION, SOLUTION SUBCUTANEOUS
Status: DISCONTINUED | OUTPATIENT
Start: 2022-08-02 | End: 2022-08-04

## 2022-08-02 RX ORDER — FERROUS SULFATE 325(65) MG
325 TABLET ORAL
Status: DISCONTINUED | OUTPATIENT
Start: 2022-08-03 | End: 2022-08-18 | Stop reason: HOSPADM

## 2022-08-02 RX ORDER — INSULIN LISPRO 100 [IU]/ML
2-12 INJECTION, SOLUTION INTRAVENOUS; SUBCUTANEOUS
Status: CANCELLED | OUTPATIENT
Start: 2022-08-03

## 2022-08-02 RX ORDER — BENZONATATE 100 MG/1
100 CAPSULE ORAL 3 TIMES DAILY PRN
Status: DISCONTINUED | OUTPATIENT
Start: 2022-08-02 | End: 2022-08-18 | Stop reason: HOSPADM

## 2022-08-02 RX ORDER — SPIRONOLACTONE 50 MG/1
100 TABLET, FILM COATED ORAL DAILY
Status: DISCONTINUED | OUTPATIENT
Start: 2022-08-03 | End: 2022-08-15

## 2022-08-02 RX ORDER — ESCITALOPRAM OXALATE 10 MG/1
10 TABLET ORAL DAILY
Status: CANCELLED | OUTPATIENT
Start: 2022-08-03

## 2022-08-02 RX ORDER — AMIODARONE HYDROCHLORIDE 200 MG/1
100 TABLET ORAL
Status: DISCONTINUED | OUTPATIENT
Start: 2022-08-03 | End: 2022-08-18 | Stop reason: HOSPADM

## 2022-08-02 RX ORDER — ATORVASTATIN CALCIUM 40 MG/1
40 TABLET, FILM COATED ORAL
Status: CANCELLED | OUTPATIENT
Start: 2022-08-03

## 2022-08-02 RX ORDER — HYDROMORPHONE HYDROCHLORIDE 2 MG/1
2 TABLET ORAL EVERY 4 HOURS PRN
Status: DISCONTINUED | OUTPATIENT
Start: 2022-08-02 | End: 2022-08-18 | Stop reason: HOSPADM

## 2022-08-02 RX ORDER — HYDROMORPHONE HYDROCHLORIDE 2 MG/1
4 TABLET ORAL EVERY 4 HOURS PRN
Status: CANCELLED | OUTPATIENT
Start: 2022-08-02

## 2022-08-02 RX ORDER — LIDOCAINE 50 MG/G
2 PATCH TOPICAL DAILY
Status: CANCELLED | OUTPATIENT
Start: 2022-08-03

## 2022-08-02 RX ORDER — GUAIFENESIN 600 MG/1
1200 TABLET, EXTENDED RELEASE ORAL EVERY 12 HOURS SCHEDULED
Status: CANCELLED | OUTPATIENT
Start: 2022-08-02

## 2022-08-02 RX ORDER — PANTOPRAZOLE SODIUM 40 MG/1
40 TABLET, DELAYED RELEASE ORAL
Status: DISCONTINUED | OUTPATIENT
Start: 2022-08-03 | End: 2022-08-06 | Stop reason: ALTCHOICE

## 2022-08-02 RX ORDER — BENZONATATE 100 MG/1
100 CAPSULE ORAL 3 TIMES DAILY PRN
Status: CANCELLED | OUTPATIENT
Start: 2022-08-02

## 2022-08-02 RX ORDER — PREGABALIN 75 MG/1
75 CAPSULE ORAL DAILY
Status: DISCONTINUED | OUTPATIENT
Start: 2022-08-03 | End: 2022-08-18 | Stop reason: HOSPADM

## 2022-08-02 RX ORDER — HYDROMORPHONE HCL/PF 1 MG/ML
0.5 SYRINGE (ML) INJECTION EVERY 2 HOUR PRN
Status: CANCELLED | OUTPATIENT
Start: 2022-08-02

## 2022-08-02 RX ORDER — PREGABALIN 75 MG/1
75 CAPSULE ORAL DAILY
Status: CANCELLED | OUTPATIENT
Start: 2022-08-03

## 2022-08-02 RX ORDER — LIDOCAINE 50 MG/G
2 PATCH TOPICAL DAILY
Status: DISCONTINUED | OUTPATIENT
Start: 2022-08-03 | End: 2022-08-18 | Stop reason: HOSPADM

## 2022-08-02 RX ORDER — INSULIN LISPRO 100 [IU]/ML
2-12 INJECTION, SOLUTION INTRAVENOUS; SUBCUTANEOUS
Status: CANCELLED | OUTPATIENT
Start: 2022-08-02

## 2022-08-02 RX ORDER — HYDROMORPHONE HYDROCHLORIDE 2 MG/1
4 TABLET ORAL EVERY 4 HOURS PRN
Status: DISCONTINUED | OUTPATIENT
Start: 2022-08-02 | End: 2022-08-18 | Stop reason: HOSPADM

## 2022-08-02 RX ORDER — BUMETANIDE 2 MG/1
2 TABLET ORAL 2 TIMES DAILY
Status: DISCONTINUED | OUTPATIENT
Start: 2022-08-03 | End: 2022-08-08

## 2022-08-02 RX ORDER — INSULIN GLARGINE 100 [IU]/ML
45 INJECTION, SOLUTION SUBCUTANEOUS
Status: CANCELLED | OUTPATIENT
Start: 2022-08-02

## 2022-08-02 RX ORDER — INSULIN LISPRO 100 [IU]/ML
2-12 INJECTION, SOLUTION INTRAVENOUS; SUBCUTANEOUS
Status: DISCONTINUED | OUTPATIENT
Start: 2022-08-03 | End: 2022-08-06

## 2022-08-02 RX ORDER — HYDROXYZINE HYDROCHLORIDE 25 MG/1
25 TABLET, FILM COATED ORAL EVERY 6 HOURS PRN
Status: DISCONTINUED | OUTPATIENT
Start: 2022-08-02 | End: 2022-08-18 | Stop reason: HOSPADM

## 2022-08-02 RX ORDER — PREGABALIN 25 MG/1
25 CAPSULE ORAL EVERY EVENING
Status: DISCONTINUED | OUTPATIENT
Start: 2022-08-03 | End: 2022-08-18 | Stop reason: HOSPADM

## 2022-08-02 RX ORDER — INSULIN LISPRO 100 [IU]/ML
22 INJECTION, SOLUTION INTRAVENOUS; SUBCUTANEOUS
Status: CANCELLED | OUTPATIENT
Start: 2022-08-03

## 2022-08-02 RX ORDER — DIAZEPAM 5 MG/1
5 TABLET ORAL EVERY 6 HOURS PRN
Status: CANCELLED | OUTPATIENT
Start: 2022-08-02

## 2022-08-02 RX ORDER — INSULIN LISPRO 100 [IU]/ML
22 INJECTION, SOLUTION INTRAVENOUS; SUBCUTANEOUS
Status: DISCONTINUED | OUTPATIENT
Start: 2022-08-03 | End: 2022-08-03

## 2022-08-02 RX ORDER — PANTOPRAZOLE SODIUM 40 MG/1
40 TABLET, DELAYED RELEASE ORAL
Status: CANCELLED | OUTPATIENT
Start: 2022-08-03

## 2022-08-02 RX ORDER — INSULIN LISPRO 100 [IU]/ML
2-12 INJECTION, SOLUTION INTRAVENOUS; SUBCUTANEOUS
Status: DISCONTINUED | OUTPATIENT
Start: 2022-08-02 | End: 2022-08-09

## 2022-08-02 RX ORDER — FERROUS SULFATE 325(65) MG
325 TABLET ORAL
Status: CANCELLED | OUTPATIENT
Start: 2022-08-03

## 2022-08-02 RX ORDER — AMOXICILLIN 250 MG
1 CAPSULE ORAL 2 TIMES DAILY
Status: CANCELLED | OUTPATIENT
Start: 2022-08-02

## 2022-08-02 RX ORDER — BUMETANIDE 1 MG/1
2 TABLET ORAL 2 TIMES DAILY
Status: CANCELLED | OUTPATIENT
Start: 2022-08-02

## 2022-08-02 RX ORDER — ALBUTEROL SULFATE 2.5 MG/3ML
2.5 SOLUTION RESPIRATORY (INHALATION) EVERY 4 HOURS PRN
Status: CANCELLED | OUTPATIENT
Start: 2022-08-02

## 2022-08-02 RX ORDER — LEVALBUTEROL 1.25 MG/.5ML
1.25 SOLUTION, CONCENTRATE RESPIRATORY (INHALATION)
Status: CANCELLED | OUTPATIENT
Start: 2022-08-02

## 2022-08-02 RX ORDER — HYDROXYZINE HYDROCHLORIDE 25 MG/1
25 TABLET, FILM COATED ORAL EVERY 6 HOURS PRN
Status: CANCELLED | OUTPATIENT
Start: 2022-08-02

## 2022-08-02 RX ORDER — PREGABALIN 25 MG/1
25 CAPSULE ORAL EVERY EVENING
Status: CANCELLED | OUTPATIENT
Start: 2022-08-02

## 2022-08-02 RX ORDER — LEVALBUTEROL 1.25 MG/.5ML
1.25 SOLUTION, CONCENTRATE RESPIRATORY (INHALATION)
Status: DISCONTINUED | OUTPATIENT
Start: 2022-08-02 | End: 2022-08-07

## 2022-08-02 RX ORDER — POLYETHYLENE GLYCOL 3350 17 G/17G
17 POWDER, FOR SOLUTION ORAL DAILY
Status: DISCONTINUED | OUTPATIENT
Start: 2022-08-03 | End: 2022-08-18 | Stop reason: HOSPADM

## 2022-08-02 RX ORDER — ALBUTEROL SULFATE 2.5 MG/3ML
2.5 SOLUTION RESPIRATORY (INHALATION) EVERY 4 HOURS PRN
Status: DISCONTINUED | OUTPATIENT
Start: 2022-08-02 | End: 2022-08-18 | Stop reason: HOSPADM

## 2022-08-02 RX ORDER — ACETAMINOPHEN 325 MG/1
975 TABLET ORAL EVERY 8 HOURS SCHEDULED
Status: CANCELLED | OUTPATIENT
Start: 2022-08-02

## 2022-08-02 RX ORDER — METOPROLOL SUCCINATE 50 MG/1
50 TABLET, EXTENDED RELEASE ORAL EVERY 12 HOURS
Status: CANCELLED | OUTPATIENT
Start: 2022-08-03

## 2022-08-02 RX ORDER — ALBUTEROL SULFATE 2.5 MG/3ML
2.5 SOLUTION RESPIRATORY (INHALATION) EVERY 4 HOURS PRN
Status: DISCONTINUED | OUTPATIENT
Start: 2022-08-02 | End: 2022-08-05

## 2022-08-02 RX ORDER — ATORVASTATIN CALCIUM 40 MG/1
40 TABLET, FILM COATED ORAL
Status: DISCONTINUED | OUTPATIENT
Start: 2022-08-03 | End: 2022-08-18 | Stop reason: HOSPADM

## 2022-08-02 RX ORDER — AMOXICILLIN 250 MG
1 CAPSULE ORAL 2 TIMES DAILY
Status: DISCONTINUED | OUTPATIENT
Start: 2022-08-03 | End: 2022-08-14

## 2022-08-02 RX ORDER — ACETAMINOPHEN 325 MG/1
975 TABLET ORAL EVERY 8 HOURS SCHEDULED
Status: DISCONTINUED | OUTPATIENT
Start: 2022-08-02 | End: 2022-08-06 | Stop reason: SDUPTHER

## 2022-08-02 RX ORDER — ESCITALOPRAM OXALATE 10 MG/1
10 TABLET ORAL DAILY
Status: DISCONTINUED | OUTPATIENT
Start: 2022-08-03 | End: 2022-08-18 | Stop reason: HOSPADM

## 2022-08-02 RX ORDER — AMIODARONE HYDROCHLORIDE 200 MG/1
100 TABLET ORAL
Status: CANCELLED | OUTPATIENT
Start: 2022-08-03

## 2022-08-02 RX ORDER — POLYETHYLENE GLYCOL 3350 17 G/17G
17 POWDER, FOR SOLUTION ORAL DAILY
Status: CANCELLED | OUTPATIENT
Start: 2022-08-03

## 2022-08-02 RX ORDER — GUAIFENESIN 600 MG/1
1200 TABLET, EXTENDED RELEASE ORAL EVERY 12 HOURS SCHEDULED
Status: DISCONTINUED | OUTPATIENT
Start: 2022-08-02 | End: 2022-08-03

## 2022-08-02 RX ORDER — HYDROMORPHONE HYDROCHLORIDE 2 MG/1
2 TABLET ORAL EVERY 4 HOURS PRN
Status: CANCELLED | OUTPATIENT
Start: 2022-08-02

## 2022-08-02 RX ORDER — LEVALBUTEROL 1.25 MG/.5ML
1.25 SOLUTION, CONCENTRATE RESPIRATORY (INHALATION)
Status: DISCONTINUED | OUTPATIENT
Start: 2022-08-03 | End: 2022-08-02

## 2022-08-02 RX ORDER — METOPROLOL SUCCINATE 50 MG/1
50 TABLET, EXTENDED RELEASE ORAL EVERY 12 HOURS
Status: DISCONTINUED | OUTPATIENT
Start: 2022-08-03 | End: 2022-08-18 | Stop reason: HOSPADM

## 2022-08-02 RX ADMIN — PREGABALIN 75 MG: 75 CAPSULE ORAL at 09:25

## 2022-08-02 RX ADMIN — PREGABALIN 25 MG: 25 CAPSULE ORAL at 18:12

## 2022-08-02 RX ADMIN — IPRATROPIUM BROMIDE 0.5 MG: 0.5 SOLUTION RESPIRATORY (INHALATION) at 13:53

## 2022-08-02 RX ADMIN — ROPIVACAINE HYDROCHLORIDE: 2 INJECTION, SOLUTION EPIDURAL; INFILTRATION at 03:23

## 2022-08-02 RX ADMIN — POLYETHYLENE GLYCOL 3350 17 G: 17 POWDER, FOR SOLUTION ORAL at 09:25

## 2022-08-02 RX ADMIN — VANCOMYCIN HYDROCHLORIDE 1250 MG: 5 INJECTION, POWDER, LYOPHILIZED, FOR SOLUTION INTRAVENOUS at 13:51

## 2022-08-02 RX ADMIN — INSULIN LISPRO 2 UNITS: 100 INJECTION, SOLUTION INTRAVENOUS; SUBCUTANEOUS at 09:26

## 2022-08-02 RX ADMIN — GUAIFENESIN 1200 MG: 600 TABLET ORAL at 09:25

## 2022-08-02 RX ADMIN — INSULIN LISPRO 22 UNITS: 100 INJECTION, SOLUTION INTRAVENOUS; SUBCUTANEOUS at 18:15

## 2022-08-02 RX ADMIN — IPRATROPIUM BROMIDE 0.5 MG: 0.5 SOLUTION RESPIRATORY (INHALATION) at 21:43

## 2022-08-02 RX ADMIN — METOPROLOL SUCCINATE 50 MG: 50 TABLET, EXTENDED RELEASE ORAL at 05:18

## 2022-08-02 RX ADMIN — INSULIN LISPRO 4 UNITS: 100 INJECTION, SOLUTION INTRAVENOUS; SUBCUTANEOUS at 18:15

## 2022-08-02 RX ADMIN — INSULIN LISPRO 22 UNITS: 100 INJECTION, SOLUTION INTRAVENOUS; SUBCUTANEOUS at 09:29

## 2022-08-02 RX ADMIN — GUAIFENESIN 1200 MG: 600 TABLET, EXTENDED RELEASE ORAL at 21:27

## 2022-08-02 RX ADMIN — SENNOSIDES AND DOCUSATE SODIUM 1 TABLET: 50; 8.6 TABLET ORAL at 09:26

## 2022-08-02 RX ADMIN — TICAGRELOR 90 MG: 90 TABLET ORAL at 18:11

## 2022-08-02 RX ADMIN — CEFEPIME HYDROCHLORIDE 2000 MG: 2 INJECTION, SOLUTION INTRAVENOUS at 00:56

## 2022-08-02 RX ADMIN — APIXABAN 5 MG: 5 TABLET, FILM COATED ORAL at 18:12

## 2022-08-02 RX ADMIN — SENNOSIDES AND DOCUSATE SODIUM 1 TABLET: 50; 8.6 TABLET ORAL at 18:11

## 2022-08-02 RX ADMIN — PANTOPRAZOLE SODIUM 40 MG: 40 TABLET, DELAYED RELEASE ORAL at 05:18

## 2022-08-02 RX ADMIN — LEVALBUTEROL HYDROCHLORIDE 1.25 MG: 1.25 SOLUTION, CONCENTRATE RESPIRATORY (INHALATION) at 13:53

## 2022-08-02 RX ADMIN — BUMETANIDE 2 MG: 1 TABLET ORAL at 18:12

## 2022-08-02 RX ADMIN — BUMETANIDE 2 MG: 1 TABLET ORAL at 09:25

## 2022-08-02 RX ADMIN — IPRATROPIUM BROMIDE 0.5 MG: 0.5 SOLUTION RESPIRATORY (INHALATION) at 07:59

## 2022-08-02 RX ADMIN — INSULIN GLARGINE 45 UNITS: 100 INJECTION, SOLUTION SUBCUTANEOUS at 21:27

## 2022-08-02 RX ADMIN — AMIODARONE HYDROCHLORIDE 100 MG: 200 TABLET ORAL at 09:25

## 2022-08-02 RX ADMIN — LEVALBUTEROL HYDROCHLORIDE 1.25 MG: 1.25 SOLUTION, CONCENTRATE RESPIRATORY (INHALATION) at 07:59

## 2022-08-02 RX ADMIN — SPIRONOLACTONE 100 MG: 100 TABLET ORAL at 09:26

## 2022-08-02 RX ADMIN — ACETAMINOPHEN 975 MG: 325 TABLET ORAL at 21:29

## 2022-08-02 RX ADMIN — ESCITALOPRAM OXALATE 10 MG: 10 TABLET ORAL at 09:26

## 2022-08-02 RX ADMIN — TICAGRELOR 90 MG: 90 TABLET ORAL at 05:18

## 2022-08-02 RX ADMIN — METOPROLOL SUCCINATE 50 MG: 50 TABLET, EXTENDED RELEASE ORAL at 18:13

## 2022-08-02 RX ADMIN — DESMOPRESSIN ACETATE 40 MG: 0.2 TABLET ORAL at 18:11

## 2022-08-02 RX ADMIN — APIXABAN 5 MG: 5 TABLET, FILM COATED ORAL at 09:25

## 2022-08-02 RX ADMIN — INSULIN LISPRO 2 UNITS: 100 INJECTION, SOLUTION INTRAVENOUS; SUBCUTANEOUS at 13:52

## 2022-08-02 RX ADMIN — LEVALBUTEROL HYDROCHLORIDE 1.25 MG: 1.25 SOLUTION, CONCENTRATE RESPIRATORY (INHALATION) at 21:43

## 2022-08-02 NOTE — PLAN OF CARE
Problem: Potential for Falls  Goal: Patient will remain free of falls  Description: INTERVENTIONS:  - Educate patient/family on patient safety including physical limitations  - Instruct patient to call for assistance with activity   - Consult OT/PT to assist with strengthening/mobility   - Keep Call bell within reach  - Keep bed low and locked with side rails adjusted as appropriate  - Keep care items and personal belongings within reach  - Initiate and maintain comfort rounds  - Make Fall Risk Sign visible to staff  - Obtain necessary fall risk management equipment  - Apply yellow socks and bracelet for high fall risk patients  - Consider moving patient to room near nurses station  Outcome: Progressing     Problem: Nutrition/Hydration-ADULT  Goal: Nutrient/Hydration intake appropriate for improving, restoring or maintaining nutritional needs  Description: Monitor and assess patient's nutrition/hydration status for malnutrition  Collaborate with interdisciplinary team and initiate plan and interventions as ordered  Monitor patient's weight and dietary intake as ordered or per policy  Utilize nutrition screening tool and intervene as necessary  Determine patient's food preferences and provide high-protein, high-caloric foods as appropriate       INTERVENTIONS:  - Monitor oral intake, urinary output, labs, and treatment plans  - Assess nutrition and hydration status and recommend course of action  - Evaluate amount of meals eaten  - Assist patient with eating if necessary   - Allow adequate time for meals  - Recommend/ encourage appropriate diets, oral nutritional supplements, and vitamin/mineral supplements  - Order, calculate, and assess calorie counts as needed  - Recommend, monitor, and adjust tube feedings and TPN/PPN based on assessed needs  - Assess need for intravenous fluids  - Provide specific nutrition/hydration education as appropriate  - Include patient/family/caregiver in decisions related to nutrition  Outcome: Progressing     Problem: PAIN - ADULT  Goal: Verbalizes/displays adequate comfort level or baseline comfort level  Description: Interventions:  - Encourage patient to monitor pain and request assistance  - Assess pain using appropriate pain scale  - Administer analgesics based on type and severity of pain and evaluate response  - Implement non-pharmacological measures as appropriate and evaluate response  - Consider cultural and social influences on pain and pain management  - Notify physician/advanced practitioner if interventions unsuccessful or patient reports new pain  Outcome: Progressing     Problem: INFECTION - ADULT  Goal: Absence or prevention of progression during hospitalization  Description: INTERVENTIONS:  - Assess and monitor for signs and symptoms of infection  - Monitor lab/diagnostic results  - Monitor all insertion sites, i e  indwelling lines, tubes, and drains  - Monitor endotracheal if appropriate and nasal secretions for changes in amount and color  - [place] appropriate cooling/warming therapies per order  - Administer medications as ordered  - Instruct and encourage patient and family to use good hand hygiene technique  - Identify and instruct in appropriate isolation precautions for identified infection/condition  Outcome: Progressing  Goal: Absence of fever/infection during neutropenic period  Description: INTERVENTIONS:  - Monitor WBC    Outcome: Progressing     Problem: SAFETY ADULT  Goal: Patient will remain free of falls  Description: INTERVENTIONS:  - Educate patient/family on patient safety including physical limitations  - Instruct patient to call for assistance with activity   - Consult OT/PT to assist with strengthening/mobility   - Keep Call bell within reach  - Keep bed low and locked with side rails adjusted as appropriate  - Keep care items and personal belongings within reach  - Initiate and maintain comfort rounds  - Make Fall Risk Sign visible to staff  - Obtain necessary fall risk management equipment  - Apply yellow socks and bracelet for high fall risk patients  - Consider moving patient to room near nurses station  Outcome: Progressing  Goal: Maintain or return to baseline ADL function  Description: INTERVENTIONS:  -  Assess patient's ability to carry out ADLs; assess patient's baseline for ADL function and identify physical deficits which impact ability to perform ADLs (bathing, care of mouth/teeth, toileting, grooming, dressing, etc )  - Assess/evaluate cause of self-care deficits   - Assess range of motion  - Assess patient's mobility; develop plan if impaired  - Assess patient's need for assistive devices and provide as appropriate  - Encourage maximum independence but intervene and supervise when necessary  - Involve family in performance of ADLs  - Assess for home care needs following discharge   - Consider OT consult to assist with ADL evaluation and planning for discharge  - Provide patient education as appropriate  Outcome: Progressing  Goal: Maintains/Returns to pre admission functional level  Description: INTERVENTIONS:  - Perform BMAT or MOVE assessment daily    - Set and communicate daily mobility goal to care team and patient/family/caregiver     - Collaborate with rehabilitation services on mobility goals if consulted  - Ambulate patient 2 times a day  - Out of bed for meals 3 times a day  - Out of bed for toileting  - Record patient progress and toleration of activity level   Outcome: Progressing     Problem: DISCHARGE PLANNING  Goal: Discharge to home or other facility with appropriate resources  Description: INTERVENTIONS:  - Identify barriers to discharge w/patient and caregiver  - Arrange for needed discharge resources and transportation as appropriate  - Identify discharge learning needs (meds, wound care, etc )  - Arrange for interpretive services to assist at discharge as needed  - Refer to Case Management Department for coordinating discharge planning if the patient needs post-hospital services based on physician/advanced practitioner order or complex needs related to functional status, cognitive ability, or social support system  Outcome: Progressing     Problem: Knowledge Deficit  Goal: Patient/family/caregiver demonstrates understanding of disease process, treatment plan, medications, and discharge instructions  Description: Complete learning assessment and assess knowledge base  Interventions:  - Provide teaching at level of understanding  - Provide teaching via preferred learning methods  Outcome: Progressing     Problem: MOBILITY - ADULT  Goal: Maintain or return to baseline ADL function  Description: INTERVENTIONS:  -  Assess patient's ability to carry out ADLs; assess patient's baseline for ADL function and identify physical deficits which impact ability to perform ADLs (bathing, care of mouth/teeth, toileting, grooming, dressing, etc )  - Assess/evaluate cause of self-care deficits   - Assess range of motion  - Assess patient's mobility; develop plan if impaired  - Assess patient's need for assistive devices and provide as appropriate  - Encourage maximum independence but intervene and supervise when necessary  - Involve family in performance of ADLs  - Assess for home care needs following discharge   - Consider OT consult to assist with ADL evaluation and planning for discharge  - Provide patient education as appropriate  Outcome: Progressing  Goal: Maintains/Returns to pre admission functional level  Description: INTERVENTIONS:  - Perform BMAT or MOVE assessment daily    - Set and communicate daily mobility goal to care team and patient/family/caregiver     - Collaborate with rehabilitation services on mobility goals if consulted  - Ambulate patient 2 times a day  - Out of bed for meals 3 times a day  - Out of bed for toileting  - Record patient progress and toleration of activity level   Outcome: Progressing Problem: Prexisting or High Potential for Compromised Skin Integrity  Goal: Skin integrity is maintained or improved  Description: INTERVENTIONS:  - Identify patients at risk for skin breakdown  - Assess and monitor skin integrity  - Assess and monitor nutrition and hydration status  - Monitor labs   - Assess for incontinence   - Turn and reposition patient  - Assist with mobility/ambulation  - Relieve pressure over bony prominences  - Avoid friction and shearing  - Provide appropriate hygiene as needed including keeping skin clean and dry  - Evaluate need for skin moisturizer/barrier cream  - Collaborate with interdisciplinary team   - Patient/family teaching  - Consider wound care consult   Outcome: Progressing

## 2022-08-02 NOTE — ASSESSMENT & PLAN NOTE
Wt Readings from Last 3 Encounters:   08/02/22 77 7 kg (171 lb 6 4 oz)   07/19/22 78 2 kg (172 lb 6 4 oz)   06/29/22 81 7 kg (180 lb 3 2 oz)     · Most recent EF 50%  Currently stable and euvolemic  · On Bumex 2 mg twice daily with Aldactone 100 mg daily - restarted on 7/29  · Continue low-salt diet, monitor I's and O's  · Per Cardiology Re-application of Life Vest upon discharge    · Will need follow-up with General Cardiology & EP service upon discharge for ICD consideration

## 2022-08-02 NOTE — ASSESSMENT & PLAN NOTE
· Patient noted on imaging to have small pneumothorax at [facility], subsequently transferred to [facility]  · Chest tube placed 7/20, then upsized on 7/22 given no significant change and appearance that it was kinked  · Chest x-ray performed 7/24 -no pneumothorax at that time    Chest tube was subsequently removed

## 2022-08-02 NOTE — ASSESSMENT & PLAN NOTE
Lab Results   Component Value Date    HGBA1C 12 7 (H) 05/22/2022     Recent Labs     08/01/22  1512 08/01/22  2105 08/02/22  0738 08/02/22  1103   POCGLU 189* 174* 160* 158*     Blood Sugar Average: Last 72 hrs:  · (P) 567 [id number]   · Very poorly controlled based on A1c  · Continue basal/bolus insulin to 45 units qHS and 22 units Humalog TID with meals today  · SSI for coverage as well     · ADA diet, Accu-Cheks a c  HS

## 2022-08-02 NOTE — PROGRESS NOTES
Progress Note - Infectious Disease   Kelli Red 65 y o  female MRN: [id number]  Unit/Bed#: S -01 Encounter: [id number]      Impression/Recommendations:  1  Probable right-sided empyema  CT show loculated right pleural effusion  Patient is status post placement of chest tube on 07/29, with highly elevated pleural fluid WBC with neutrophil predominance  Culture has no growth, likely due to prior antibiotic  Given MRSA in right chest wound, MRSA is likely pathogen in empyema  Continue IV vancomycin  No further need for cefepime  Monitor temperature/WBC  Continue chest tube drainage per Pulmonary/IR  Treat x3 weeks total, through 8/16      2  Developing sepsis, with leukocytosis and tachypnea, likely secondary to empyema above  Tachypnea has improved WBC still elevated  Fortunately, patient remains systemically well, without toxicity and he most stable, without hypotension  Antibiotic plan as in below  Monitor temperature/WBC  Monitor hemodynamics      3  MRSA right chest wall infection, as site recent chest tube placement for spontaneous pneumothorax  Antibiotic plan as in above  Serial exams      4  Acute on chronic hypoxic respiratory failure, likely multifactorial   Respiratory culture with MRSA and Pseudomonas but no obvious pneumonia  These isolates are most likely airway colonization  Management per primary service      5  Recent spontaneous pneumothorax, status post chest tube placement on 07/20  This was removed on 07/24      6  DM, type 2, poorly controlled, with hyperglycemia and elevated hemoglobin A1c  This is risk factor for infection above  Management per primary service      7  MODESTO  Creatinine is much improved  Antibiotic at full dose  Monitor creatinine      Discussed with patient in detail regarding the above plan      Antibiotics:  Vancomycin/cefepime # 7     Subjective:  Patient with stable mild chest pain at chest tube insertion site  Dyspnea mild and stable  Cough mild stable, nonproductive  Temperature stays down  No chills  She is tolerating antibiotics well  No nausea, vomiting or diarrhea      Objective:  Vitals:  Temp:  [98 9 °F (37 2 °C)-99 7 °F (37 6 °C)] 98 9 °F (37 2 °C)  HR:  [63-76] 63  Resp:  [16-18] 16  BP: (112-127)/(67-71) 112/70  SpO2:  [90 %-97 %] 95 %  Temp (24hrs), Av 2 °F (37 3 °C), Min:98 9 °F (37 2 °C), Max:99 7 °F (37 6 °C)  Current: Temperature: 98 9 °F (37 2 °C)    Physical Exam:     General: Awake, alert, cooperative, no distress  Neck:  Supple  No mass  No lymphadenopathy  Lungs: Decreased breath sounds on right, right basilar rhonchi and rales, no wheezing, respirations unlabored  Heart:  Regular rate and rhythm, S1 and S2 normal, no murmur  Abdomen: Soft, nondistended, non-tender, bowel sounds active all four quadrants, no masses, no organomegaly  Extremities: No edema  No erythema/warmth  No ulcer  Nontender to palpation  Skin:  No rash  Neuro: Moves all extremities  Invasive Devices  Report    Peripheral Intravenous Line  Duration           Peripheral IV 22 Left Antecubital <1 day          Epidural Line  Duration           Nerve Block Catheter 22 3 days          Drain  Duration           Chest Tube 1 Right Posterior 12 Fr  3 days          Airway  Duration           Surgical Airway Shiley Fenestrated 153 days                Labs studies:   I have personally reviewed pertinent labs  Results from last 7 days   Lab Units 22  0634 22  0457 22  0443 22  0459 22  0447   POTASSIUM mmol/L 3 8 4 1 4 2   < > 4 3   CHLORIDE mmol/L 98 99 100   < > 96   CO2 mmol/L 27 25 21   < > 26   BUN mg/dL 35* 40* 49*   < > 47*   CREATININE mg/dL 1 14 1 07 1 12   < > 1 33*   EGFR ml/min/1 73sq m 53 58 55   < > 44   CALCIUM mg/dL 8 5 8 3* 8 1*   < > 8 5   AST U/L 16  --   --   --  9*   ALT U/L 7  --   --   --  6*   ALK PHOS U/L 75  --   --   --  91    < > = values in this interval not displayed  Results from last 7 days   Lab Units 08/02/22  0634 08/01/22  2026 08/01/22  0457 07/31/22  0443   WBC Thousand/uL 14 29*  --  17 46* 16 13*   HEMOGLOBIN g/dL 8 2* 7 7* 7 8* 8 4*   PLATELETS Thousands/uL 625*  --  611* 617*     Results from last 7 days   Lab Units 07/29/22  1529 07/28/22  1201 07/26/22  1349 07/26/22  1109   SPUTUM CULTURE   --   --  2+ Growth of Staphylococcus aureus*  1+ Growth of Pseudomonas aeruginosa*  1+ Growth of   --    GRAM STAIN RESULT  1+ Polys  No organisms seen 3+ Polys*  2+ Gram positive cocci in pairs* 1+ Epithelial cells per low power field*  3+ Polys*  1+ Gram positive cocci in pairs*  Rare Gram positive rods*  --    WOUND CULTURE   --  2+ Growth of Methicillin Resistant Staphylococcus aureus*  --   --    BODY FLUID CULTURE, STERILE  No growth  --   --   --    MRSA CULTURE ONLY   --   --   --  No Methicillin Resistant Staphlyococcus aureus (MRSA) isolated       Imaging Studies:   I have personally reviewed pertinent imaging study reports and images in PACS  EKG, Pathology, and Other Studies:   I have personally reviewed pertinent reports

## 2022-08-02 NOTE — ASSESSMENT & PLAN NOTE
Wt Readings from Last 3 Encounters:   08/02/22 77 7 kg (171 lb 6 4 oz)   07/19/22 78 2 kg (172 lb 6 4 oz)   06/29/22 81 7 kg (180 lb 3 2 oz)   · EF noted to be 50% on last echocardiogram 2/22  · Admitted to [facility] for CHF exac on 7/15 - given 80 mg IV lasix and then placed back on bumex 2 mg PO BID  Developed MODESTO and thus bumex dose reduced - got 2 mg on 7/19, no bumex on on 7/20, 1 mg on 7/21, 2 mg on 7/23, 3 mg on 7/23 (home dose had been bumex 2 mg PO BID)  7/24 AM with increasing SOB, oxygen requiements - given 1 mg IV bumex  · CXR showed mild vascular congestion  Suspected iatrogenic volume overload in the setting of reduced/withheld diuretic dosing and received several additional doses of IV Bumex, subsequently held additional doses of diuretics  · Consulted cardiology, appreciate their input  · Continue oral Bumex - re-started on 7/29  · Output - 4100 reported Urine output over last 24 hours

## 2022-08-02 NOTE — ASSESSMENT & PLAN NOTE
· Trach placed in the context of cardiac arrest/prolonged ventilator dependent state November 2021  · Decannulated at [facility] 12/11/21  · Patient requires frequent tracheostomy changes and suctioning  · Per discussion with speech therapy,  video barium swallow was done for sense of food getting stuck   Appropriate for regular diet  · On trach collar O2 with humidification and tolerating well at 6 L

## 2022-08-02 NOTE — ASSESSMENT & PLAN NOTE
· Loculated right pleural effusion  She is status post spontaneous right-sided pneumothorax with chest tube placement and subsequent removal  · Most recent CT of the chest on 7/28/22 revealed moderate partially loculated right pleural effusion  · Right chest tube re-placed on 7/29/22 by IR   · Cx tube output about 150 mL over last 24 hrs - 8/1  · Pleural fluid analysis shows neutrophil predominant WBC count  · Fluid cultures from prior anterior chest tube site positive for MRSA  · 8/1 - CXR: Small component of right pleural effusion suspected which may be partially loculated  Indwelling right thoracostomy tube without pneumothorax    Plan:  - Continue Abx w/ Vanc Day 7 per ID, Cefepime d/c'd  - Continue Chest tube management per Pulmonology - recommendations appreciated  - Repeat CT chest pending  - Will consider Cardio Thoracic consult based on result     - Continue to wean O2 as tolerated to maintain SpO2 > 88%

## 2022-08-02 NOTE — PROGRESS NOTES
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  4  Acute on chronic diastolic CHF and pulmonary hypertension  5  Suspected COPD of unknown severity without acute exacerbation   Discontinue tpa/dornase due to blood draining from chest tube   CT chest without IV contrast (8/2/22): Decrease right-sided pleural effusion improved aeration of right lower lobe placement ofa right pleural drainage catheter with unchanged appearance of a loculated component of the of effusion superomedially  There is slightly increased density of the effusion compatible with a small amount of blood products   Recommend consult to thoracic surgery and transfer to [place] for VATS for empyema   Trach dependent; wean oxygen via trach collar as tolerated   Continue cefepime and vancomycin   Continue Xopenex and Atrovent every Q6 hours    Subjective:    Patient was seen today and was resting comfortably in bed today how shortness of breath on 6 L via tracheostomy  Also reports continued discomfort from chest tube  Patient denies fever, chills, nausea, vomiting, chest pain  Objective:    Vitals: Blood pressure 112/70, pulse 63, temperature 98 9 °F (37 2 °C), resp  rate 16, weight 77 7 kg (171 lb 6 4 oz), SpO2 95 %   7 liters via trach mask,Body mass index is 25 31 kg/m²        Intake/Output Summary (Last 24 hours) at 8/2/2022 1020  Last data filed at 8/2/2022 0724  Gross per 24 hour   Intake 418 3 ml   Output 4150 ml   Net -3731 7 ml       Invasive Devices  Report    Peripheral Intravenous Line  Duration           Peripheral IV 08/01/22 Left Antecubital <1 day          Epidural Line  Duration           Nerve Block Catheter 07/29/22 3 days          Drain  Duration Chest Tube 1 Right Posterior 12 Fr  3 days          Airway  Duration           Surgical Airway Shiley Fenestrated 153 days                Physical Exam:     Physical Exam  Constitutional:       General: She is awake  She is not in acute distress  HENT:      Head: Normocephalic and atraumatic  Eyes:      General:         Right eye: No discharge  Left eye: No discharge  Extraocular Movements: Extraocular movements intact  Neck:      Comments: Tracheostomy in place  Cardiovascular:      Rate and Rhythm: Normal rate and regular rhythm  Pulses: Normal pulses  Heart sounds: Normal heart sounds  No murmur heard  Pulmonary:      Effort: Pulmonary effort is normal       Breath sounds: Rhonchi present  Chest:      Comments: Right-sided chest tube in place draining bloody fluid  Abdominal:      General: There is no distension  Tenderness: There is no abdominal tenderness  There is no guarding or rebound  Musculoskeletal:      Right lower leg: No edema  Left lower leg: No edema  Skin:     General: Skin is warm and dry  Neurological:      General: No focal deficit present  Mental Status: She is alert  Psychiatric:         Mood and Affect: Mood normal          Behavior: Behavior normal          Labs: I have personally reviewed pertinent lab results  Imaging and other studies: I have personally reviewed pertinent reports     and I have personally reviewed pertinent films in PACS

## 2022-08-02 NOTE — PROGRESS NOTES
Peripheral Nerve Catheter Follow-up Note - Acute Pain Service    damonica Fabiola 65 y o  female MRN: [id number]  Unit/Bed#: S -01 Encounter: [id number]      Assessment:   Principal Problem:    Empyema lung, Right  Active Problems:    Type 2 diabetes mellitus with hyperglycemia (HCC)    A-fib (HCC)    History of Cardiac arrest ([facility])    CAD (coronary artery disease)    Tracheostomy in place ([facility] )    Anemia    Hyponatremia    Acute kidney injury superimposed on chronic kidney disease stage 3 ([facility])    Acute Respiratory insufficiency on chronic hypoxic respiratory failure    Persistent pain despite chest tube removal    Chest wall wound infection    Acute on chronic heart failure with preserved ejection fraction ([facility] )    Kelli Carrington Heart is a 65y o  year old female with PMHx of VT arrest leading to prolonged intubation/ICU stay (11/2021)  Subsequent subglottic stenosis requiring trach, DM type 2, CKD stage 3, who had a right PTX s/p chest tube placement on 7/20  She had a right ES plane block with exparel on 7/23 with limited analgesic benefit (6-8 hours)  The chest tube was subsequently removed on 7/24 but her hospital course has been c/b persistent right chest wall pain and drainage from chest tube removal site  CT revealed right-sided loculated pleural effusion/empyema s/p chest tube placement on 7/29  A right T2 NOELLE catheter was placed on 7/29 in anticipation for increased pain needs secondary to chest tube replacement  Today Kelli is resting comfortably in bed dozing off when I entered the room but easily awoken for our interview  She indicates that she still has significant pain at her chest tube site however she has used her PRN pain meds sparingly  She has not used any IV dilaudid since 7/30 and her last dose of PO dilaudid was around 3pm yesterday  Plan for today is to remove her ES catheter since it is unlikely providing any analgesic benefit and has been in for 5 days      Plan:   - ES catheter removed, tip intact, infusion discontinued  - Continue remainder of MMA regimen: tylenol 975mg q8h, lidocaine patch, lyrica 25-75mg q12h, hydromorphone PO 2-4mg q4h PRN, hydromorphone IV 0 5mg q2h PRN   - Likely discontinue IV dilaudid when chest tube removed    Pain History  Current pain location(s): Right chest wall  Pain Scale:   5-6/10  Quality: Sharp  24 hour history: See above    Opioid requirement previous 24 hours: 4mg PO dilaudid    Meds/Allergies     Allergies   Allergen Reactions    Metformin Diarrhea    Clonidine Rash     Patch        Objective     Temp:  [98 9 °F (37 2 °C)-99 7 °F (37 6 °C)] 98 9 °F (37 2 °C)  HR:  [63-76] 63  Resp:  [16-18] 16  BP: (112-127)/(67-71) 112/70  FiO2 (%):  [28] 28    Physical Exam  Vitals and nursing note reviewed  Constitutional:       Appearance: Normal appearance  She is normal weight  HENT:      Head: Normocephalic and atraumatic  Right Ear: External ear normal       Left Ear: External ear normal       Nose: Nose normal       Mouth/Throat:      Mouth: Mucous membranes are moist       Pharynx: Oropharynx is clear  Eyes:      Conjunctiva/sclera: Conjunctivae normal    Cardiovascular:      Rate and Rhythm: Normal rate and regular rhythm  Pulses: Normal pulses  Heart sounds: Normal heart sounds  Chest:      Chest wall: Tenderness present  Abdominal:      General: Abdomen is flat  Bowel sounds are normal       Palpations: Abdomen is soft  Musculoskeletal:         General: Normal range of motion  Cervical back: Normal range of motion  Skin:     General: Skin is warm and dry  Neurological:      General: No focal deficit present  Mental Status: She is alert and oriented to person, place, and time  Mental status is at baseline         Catheter: ES catheter removed, tip intact, site clean and not tender to palpation    Lab Results:   Results from last 7 days   Lab Units 08/02/22  0634   WBC Thousand/uL 14 29*   HEMOGLOBIN g/dL 8 2* HEMATOCRIT % 27 4*   PLATELETS Thousands/uL 625*      Results from last 7 days   Lab Units 08/02/22  0634   POTASSIUM mmol/L 3 8   CHLORIDE mmol/L 98   CO2 mmol/L 27   BUN mg/dL 35*   CREATININE mg/dL 1 14   CALCIUM mg/dL 8 5   ALK PHOS U/L 75   ALT U/L 7   AST U/L 16       Counseling / Coordination of Care  Total floor / unit time spent today 15 minutes  Greater than 50% of total time was spent with the patient and / or family counseling and / or coordination of care  Please note that the [facility]PS provides consultative services regarding pain management only  With the exception of ketamine, peripheral nerve catheters, and epidural infusions (and except when indicated), final decisions regarding starting or changing doses of analgesic medications are at the discretion of the consulting service  Off hours consultation and/or medication management is generally not available      Alie Mcneil MD  Acute Pain Service

## 2022-08-02 NOTE — PROGRESS NOTES
[facility]  Progress Note - Alisha Russell 1965, 65 y o  female MRN: [id number]  Unit/Bed#: S -01 Encounter: [id number]  Primary Care Provider: Hazel Pierson MD   Date and time admitted to hospital: 7/19/2022 11:33 AM    * Empyema lung, Right  Assessment & Plan  · Loculated right pleural effusion  She is status post spontaneous right-sided pneumothorax with chest tube placement and subsequent removal  · Most recent CT of the chest on 7/28/22 revealed moderate partially loculated right pleural effusion  · Right chest tube re-placed on 7/29/22 by IR   · Cx tube output about 150 mL over last 24 hrs - 8/1  · Pleural fluid analysis shows neutrophil predominant WBC count  · Fluid cultures from prior anterior chest tube site positive for MRSA  · 8/1 - CXR: Small component of right pleural effusion suspected which may be partially loculated  Indwelling right thoracostomy tube without pneumothorax    Plan:  - Continue Abx w/ Vanc Day 7 per ID, Cefepime d/c'd  - Continue Chest tube management per Pulmonology - recommendations appreciated  - Repeat CT chest pending  - Will consider Cardio Thoracic consult based on result  - Continue to wean O2 as tolerated to maintain SpO2 > 88%    Chest wall wound infection  Assessment & Plan  · Purulent discharge noted at site of recent chest tube placement  · Cultures positive for MRSA  · Currently on IV vancomycin - will continue  · Continue local wound care/dressing changes    Acute Respiratory insufficiency on chronic hypoxic respiratory failure  Assessment & Plan  · She is status post trach  Currently on 6 L with sats in the high 90s, at home uses 10 L  · Status post recent right pneumothorax requiring chest tube and now with right empyema  · Continue aggressive respiratory protocol, p r n   Suctioning  · Continue Xoponex and Atrovent per Pulm q6hrs  · Encourage out of bed, incentive spirometry  · Pulmonology following - Repeat CT chest ordered for today 8/2  Acute on chronic heart failure with preserved ejection fraction [facility])  Assessment & Plan  Wt Readings from Last 3 Encounters:   08/02/22 77 7 kg (171 lb 6 4 oz)   07/19/22 78 2 kg (172 lb 6 4 oz)   06/29/22 81 7 kg (180 lb 3 2 oz)     · Most recent EF 50%  Currently stable and euvolemic  · On Bumex 2 mg twice daily with Aldactone 100 mg daily - restarted on 7/29  · Continue low-salt diet, monitor I's and O's  · Per Cardiology Re-application of Life Vest upon discharge  · Will need follow-up with General Cardiology & EP service upon discharge for ICD consideration      Pain at Chest tube insertion site  Assessment & Plan  · Continues with intractable pain at prior chest tube site on right anterior chest wall  Chest tube was taken out on 7/24/22  · Currently followed by acute pain service  She is status post ES Catheter insertion  · Currently on multimodal pain regimen with scheduled Tylenol, Lyrica, p r n  Valium, IV and p o  Dilaudid  · Added bowel regimen with senna/Colace b i d  And scheduled daily MiraLax on 8/1  · APS following - ES Catheter Removed today 8/2, infusion disctonued  · Continue on multimodal pain regimen with scheduled Tylenol, Lyrica, p r n  Valium, IV and p o  Dilaudid  · Plan to discontinue IV Dilaudid when Chest tube removed  Acute kidney injury superimposed on chronic kidney disease stage 3 [facility])  Assessment & Plan  Lab Results   Component Value Date    EGFR 53 08/02/2022    EGFR 58 08/01/2022    EGFR 55 07/31/2022    CREATININE 1 14 08/02/2022    CREATININE 1 07 08/01/2022    CREATININE 1 12 07/31/2022   · Creatinine elevated at 1 64 upon transfer on 7/19/22, with baseline being 0 9-1 0  · Resolved  · Continue to monitor closely  Losartan on hold    Hyponatremia  Assessment & Plan  · Corrected sodium for glucose is 135  · No additional workup at this time    Anemia  Assessment & Plan  · Acute on chronic anemia likely related to illness    No overt severe bleeding noted  · Stable    Tracheostomy in place [facility])  Assessment & Plan  · Trach placed in the context of cardiac arrest/prolonged ventilator dependent state November 2021  · Decannulated at [facility] 12/11/21  · Patient requires frequent tracheostomy changes and suctioning  · Per discussion with speech therapy,  video barium swallow was done for sense of food getting stuck  Appropriate for regular diet  · On trach collar O2 with humidification and tolerating well at 6 L       CAD (coronary artery disease)  Assessment & Plan  · STEMI 10/22/2021 s/p PATRICA mid circumflex OM1  OM2 was ballooned but not stented  · Pulseless VT 10/27/21 - repeat LHC 90% mid circumflex stenosis, but could not be intervened on  · VT 10/28/21 LHC:  found to have apical LAD complete occlusion was felt to be small to be intervened    · Cardiac carrest 1/1/22 - NO cardiac cath at [facility] felt to be hypoxic vs  VT induced  · On beta-blocker, Brilinta and Lipitor      History of Cardiac arrest [facility])  Assessment & Plan  · STEMI 10/22/21 - PATRICA to mid circumflex  · VT arrest 10/26   · Polymorphic VT x 1 shock 10/28/21  · ICD not placed given risks felt to outweigh benefits with cognitive function and risk of infection at that that time  · Cardiac arrest 1/1/22 - likely VT related - went to [facility]  · No ICD given lung infection and on IV ABX x 4 weeks  · Canceled 2 EP appts since that time and thus no ICD in place - still wears LifeVest  · Most recent EF improved to 50%    A-fib [facility])  Assessment & Plan  · On anticoagulation with Eliquis   · Continue amiodarone, Toprol    Type 2 diabetes mellitus with hyperglycemia [facility])  Assessment & Plan  Lab Results   Component Value Date    HGBA1C 12 7 (H) 05/22/2022     Recent Labs     08/01/22  1512 08/01/22  2105 08/02/22  0738 08/02/22  1103   POCGLU 189* 174* 160* 158*     Blood Sugar Average: Last 72 hrs:  · (P) 516 [id number]   · Very poorly controlled based on A1c  · Continue basal/bolus insulin to 45 units qHS and 22 units Humalog TID with meals today  · SSI for coverage as well  · ADA diet, Accu-Cheks a c  HS    Hypokalemia-resolved as of 7/27/2022  Assessment & Plan  · Potassium currently stable  · Goal for K >4 given history of VT cardiac arrest  · K 6 1 on 7/20/22, avoid overcorrection      Primary spontaneous pneumothorax-resolved as of 7/25/2022  Assessment & Plan  · Patient noted on imaging to have small pneumothorax at [facility], subsequently [facility]  · Chest tube placed 7/20, then upsized on 7/22 given no significant change and appearance that it was kinked  · Chest x-ray performed 7/24 -no pneumothorax at that time  Chest tube was subsequently removed      Acute on chronic HFrEF (heart failure with reduced ejection fraction) (HCC)-resolved as of 7/27/2022  Assessment & Plan  Wt Readings from Last 3 Encounters:   08/02/22 77 7 kg (171 lb 6 4 oz)   07/19/22 78 2 kg (172 lb 6 4 oz)   06/29/22 81 7 kg (180 lb 3 2 oz)   · EF noted to be 50% on last echocardiogram 2/22  · Admitted to [facility] for CHF exac on 7/15 - given 80 mg IV lasix and then placed back on bumex 2 mg PO BID  Developed MODESTO and thus bumex dose reduced - got 2 mg on 7/19, no bumex on on 7/20, 1 mg on 7/21, 2 mg on 7/23, 3 mg on 7/23 (home dose had been bumex 2 mg PO BID)  7/24 AM with increasing SOB, oxygen requiements - given 1 mg IV bumex  · CXR showed mild vascular congestion  Suspected iatrogenic volume overload in the setting of reduced/withheld diuretic dosing and received several additional doses of IV Bumex, subsequently held additional doses of diuretics  · Consulted cardiology, appreciate their input  · Continue oral Bumex - re-started on 7/29  · Output - 4100 reported Urine output over last 24 hours  VTE Pharmacologic Prophylaxis: VTE Score: 3 Moderate Risk (Score 3-4) - Pharmacological DVT Prophylaxis Ordered: apixaban (Eliquis)      Patient Centered Rounds: I performed bedside rounds with nursing staff today   Discussions with Specialists or Other Care Team Provider: Infectious Disease, Pulm    Education and Discussions with Family / Patient: Updated  (son) via phone  Time Spent for Care: 30 minutes  More than 50% of total time spent on counseling and coordination of care as described above  Current Length of Stay: 14 day(s)  Current Patient Status: Inpatient   Certification Statement: The patient will continue to require additional inpatient hospital stay due to Empyema, Chest tube  Discharge Plan: Anticipate discharge in >72 hrs to discharge location to be determined pending rehab evaluations  Code Status: Level 1 - Full Code    Subjective:   Pt seen and examined this AM lying comfortably in bed  She states she continues to have some pain around chest tube site  Pt continues to tolerate PO intake  She denies any symptoms of sob, cough  No fevers, chills, nausea, vomiting, diarrhea  Pt states continues with no BM since Saturday  Objective:     Vitals:   Temp (24hrs), Av 1 °F (37 3 °C), Min:98 9 °F (37 2 °C), Max:99 7 °F (37 6 °C)    Temp:  [98 9 °F (37 2 °C)-99 7 °F (37 6 °C)] 98 9 °F (37 2 °C)  HR:  [63-76] 68  Resp:  [14-18] 14  BP: (111-127)/(65-71) 111/65  SpO2:  [90 %-97 %] 90 %  Body mass index is 25 31 kg/m²  Input and Output Summary (last 24 hours): Intake/Output Summary (Last 24 hours) at 2022 1302  Last data filed at 2022 0724  Gross per 24 hour   Intake 368 3 ml   Output 4150 ml   Net -3781 7 ml       Physical Exam:   Physical Exam  Vitals and nursing note reviewed  Constitutional:       General: She is not in acute distress  Appearance: She is ill-appearing  HENT:      Head: Normocephalic and atraumatic  Right Ear: External ear normal       Left Ear: External ear normal       Nose: Nose normal       Mouth/Throat:      Mouth: Mucous membranes are moist       Pharynx: No oropharyngeal exudate     Eyes:      Extraocular Movements: Extraocular movements intact  Conjunctiva/sclera: Conjunctivae normal       Pupils: Pupils are equal, round, and reactive to light  Neck:      Comments: Tach collar in place, some mucopurulence noted  Cardiovascular:      Rate and Rhythm: Normal rate and regular rhythm  Pulses: Normal pulses  Heart sounds: Normal heart sounds  Pulmonary:      Effort: Pulmonary effort is normal       Breath sounds: Rhonchi present  No wheezing or rales  Abdominal:      General: Bowel sounds are normal       Palpations: Abdomen is soft  Tenderness: There is no abdominal tenderness  There is no rebound  Musculoskeletal:      Cervical back: No tenderness  Comments: Posterior Chest wall TTP around chest tube insertion site  NO evidence of infection  Skin:     General: Skin is warm  Capillary Refill: Capillary refill takes less than 2 seconds  Comments: Chest wall wound - healing - approx 1 cm with purulent discharge noted  Neurological:      General: No focal deficit present  Mental Status: She is alert and oriented to person, place, and time  Psychiatric:         Mood and Affect: Mood normal          Behavior: Behavior normal           Additional Data:     Labs:  Results from last 7 days   Lab Units 08/02/22  0634 08/01/22  0457 07/31/22  0443   WBC Thousand/uL 14 29*   < > 16 13*   HEMOGLOBIN g/dL 8 2*   < > 8 4*   HEMATOCRIT % 27 4*   < > 28 1*   PLATELETS Thousands/uL 625*   < > 617*   NEUTROS PCT %  --   --  79*   LYMPHS PCT %  --   --  8*   LYMPHO PCT % 4*   < >  --    MONOS PCT %  --   --  10   MONO PCT % 8   < >  --    EOS PCT % 2   < > 1    < > = values in this interval not displayed       Results from last 7 days   Lab Units 08/02/22  0634   SODIUM mmol/L 132*   POTASSIUM mmol/L 3 8   CHLORIDE mmol/L 98   CO2 mmol/L 27   BUN mg/dL 35*   CREATININE mg/dL 1 14   ANION GAP mmol/L 7   CALCIUM mg/dL 8 5   ALBUMIN g/dL 2 7*   TOTAL BILIRUBIN mg/dL 0 35   ALK PHOS U/L 75   ALT U/L 7   AST U/L 16   GLUCOSE RANDOM mg/dL 138         Results from last 7 days   Lab Units 08/02/22  1103 08/02/22  0738 08/01/22  2105 08/01/22  1512 08/01/22  1110 08/01/22  0715 07/31/22  2115 07/31/22  1753 07/31/22  1621 07/31/22  1108 07/31/22  0739 07/30/22  2108   POC GLUCOSE mg/dl 158* 160* 174* 189* 194* 165* 256* 106 80 247* 266* 248*         Results from last 7 days   Lab Units 07/29/22  0447 07/27/22  0619   PROCALCITONIN ng/ml 0 77* 0 68*       Lines/Drains:  Invasive Devices  Report    Peripheral Intravenous Line  Duration           Peripheral IV 08/01/22 Left Antecubital <1 day          Epidural Line  Duration           Nerve Block Catheter 07/29/22 4 days          Drain  Duration           Chest Tube 1 Right Posterior 12 Fr  3 days          Airway  Duration           Surgical Airway Shiley Fenestrated 153 days                  Telemetry:  Telemetry Orders (From admission, onward)             LifeVest Patient: Continuous Telemetry Monitoring during hospitalization (non-expiring)  Continuous LifeVest Telemetry Monitoring        References:    LifeVest Policy                 Telemetry Reviewed: Normal Sinus Rhythm  Indication for Continued Telemetry Use: Arrthymias requiring medical therapy             Imaging: Reviewed radiology reports from this admission including: chest xray    Recent Cultures (last 7 days):   Results from last 7 days   Lab Units 07/29/22  1529 07/28/22  1201 07/26/22  1349   SPUTUM CULTURE   --   --  2+ Growth of Staphylococcus aureus*  1+ Growth of Pseudomonas aeruginosa*  1+ Growth of    GRAM STAIN RESULT  1+ Polys  No organisms seen 3+ Polys*  2+ Gram positive cocci in pairs* 1+ Epithelial cells per low power field*  3+ Polys*  1+ Gram positive cocci in pairs*  Rare Gram positive rods*   WOUND CULTURE   --  2+ Growth of Methicillin Resistant Staphylococcus aureus*  --    BODY FLUID CULTURE, STERILE  No growth  --   --        Last 24 Hours Medication List:   Current Facility-Administered Medications   Medication Dose Route Frequency Provider Last Rate    acetaminophen  975 mg Oral Q8H [address] Leda Chand PA-C      albuterol  2 5 mg Nebulization Q4H PRN Leda Chand PA-C      amiodarone  100 mg Oral Daily With Breakfast Leda Chand PA-C      apixaban  5 mg Oral BID Leda Chand PA-C      atorvastatin  40 mg Oral Daily With [employer], DONALD      benzonatate  100 mg Oral TID PRN Edel Wiseman MD      bumetanide  2 mg Oral BID NEELAM Edwards      diazepam  5 mg Oral Q6H PRN Patty Mccauley PA-C      escitalopram  10 mg Oral Daily Leda Chand PA-C      ferrous sulfate  325 mg Oral Daily With Breakfast Leda Chand PA-C      guaiFENesin  1,200 mg Oral Q12H [address] Leda Chand PA-C      HYDROmorphone  0 5 mg Intravenous Q2H PRN Jagdish Bassett MD      HYDROmorphone  2 mg Oral Q4H PRN Patty Mccauley PA-C      HYDROmorphone  4 mg Oral Q4H PRN Etta Nelson PA-C      hydrOXYzine HCL  25 mg Oral Q6H PRN Monica Giraldo DO      insulin glargine  45 Units Subcutaneous HS Treasure Arce MD      insulin lispro  2-12 Units Subcutaneous TID AC Leda Chand PA-C      insulin lispro  2-12 Units Subcutaneous HS Leda Chand PA-C      insulin lispro  22 Units Subcutaneous TID With Meals Treasure Arce MD      ipratropium  0 5 mg Nebulization TID Jeramie Espana PA-C      levalbuterol  1 25 mg Nebulization TID Jeramie Espana PA-C      lidocaine  2 patch Topical Daily Leda Chand PA-C      metoprolol succinate  50 mg Oral Q12H NEELAM Edwards      pantoprazole  40 mg Oral Early Morning Leda Chand PA-C      polyethylene glycol  17 g Oral Daily Teodoro Masters DO      pregabalin  25 mg Oral QPM Leda Chand PA-C      pregabalin  75 mg Oral Daily Leda Chand PA-C      senna-docusate sodium  1 tablet Oral BID Treasure Arce MD      spironolactone  100 mg Oral Daily Leda Chand PA-C  ticagrelor  90 mg Oral Q12H Leda Chand PA-C      trimethobenzamide  200 mg Intramuscular Q6H PRN Eve Florentino PA-C      vancomycin  1,250 mg Intravenous Q24H Anthony Godoy MD          Today, Patient Was Seen By: Bibi Rocha DO    **Please Note: This note may have been constructed using a voice recognition system  **

## 2022-08-02 NOTE — PROGRESS NOTES
Vancomycin IV Pharmacy-to-Dose Consultation    Danyel Zabala is a 65 y o  female who is currently receiving Vancomycin IV with management by the Pharmacy Consult service for the treatment of Pneumonia    Assessment/Plan:    The patient's chart was reviewed  Renal function is stable  There are no signs or symptoms of nephrotoxicity and/or infusion reactions documented  Based on today's assessment, since renal function at baseline, will change current vancomycin dosing to 1250mg IV every 24 hours, with a plan for trough to be drawn at 1130 on 8/4  We will continue to follow the patient's culture results and clinical progress daily        Corey Cedeoñ, PharmD   Pharmacist

## 2022-08-02 NOTE — ASSESSMENT & PLAN NOTE
Lab Results   Component Value Date    EGFR 53 08/02/2022    EGFR 58 08/01/2022    EGFR 55 07/31/2022    CREATININE 1 14 08/02/2022    CREATININE 1 07 08/01/2022    CREATININE 1 12 07/31/2022   · Creatinine elevated at 1 64 upon transfer on 7/19/22, with baseline being 0 9-1 0  · Resolved  · Continue to monitor closely    Losartan on hold

## 2022-08-02 NOTE — ASSESSMENT & PLAN NOTE
· Continues with intractable pain at prior chest tube site on right anterior chest wall  Chest tube was taken out on 7/24/22  · Currently followed by acute pain service  She is status post ES Catheter insertion  · Currently on multimodal pain regimen with scheduled Tylenol, Lyrica, p r n  Valium, IV and p o  Dilaudid  · Added bowel regimen with senna/Colace b i d  And scheduled daily MiraLax on 8/1  · APS following - ES Catheter Removed today 8/2, infusion disctonued  · Continue on multimodal pain regimen with scheduled Tylenol, Lyrica, p r n  Valium, IV and p o  Dilaudid  · Plan to discontinue IV Dilaudid when Chest tube removed

## 2022-08-02 NOTE — ASSESSMENT & PLAN NOTE
· STEMI 10/22/2021 s/p PATRICA mid circumflex OM1  OM2 was ballooned but not stented  · Pulseless VT 10/27/21 - repeat LHC 90% mid circumflex stenosis, but could not be intervened on  · VT 10/28/21 LHC:  found to have apical LAD complete occlusion was felt to be small to be intervened    · Cardiac carrest 1/1/22 - [facility] cath [facility] felt to be hypoxic vs  VT induced  · On beta-blocker, Brilinta and Lipitor

## 2022-08-02 NOTE — ASSESSMENT & PLAN NOTE
· She is status post trach  Currently on 6 L with sats in the high 90s, at home uses 10 L  · Status post recent right pneumothorax requiring chest tube and now with right empyema  · Continue aggressive respiratory protocol, p r n  Suctioning  · Continue Xoponex and Atrovent per Pulm q6hrs  · Encourage out of bed, incentive spirometry  · Pulmonology following - Repeat CT chest ordered for today 8/2

## 2022-08-02 NOTE — PLAN OF CARE
Problem: Potential for Falls  Goal: Patient will remain free of falls  Description: INTERVENTIONS:  - Educate patient/family on patient safety including physical limitations  - Instruct patient to call for assistance with activity   - Consult OT/PT to assist with strengthening/mobility   - Keep Call bell within reach  - Keep bed low and locked with side rails adjusted as appropriate  - Keep care items and personal belongings within reach  - Initiate and maintain comfort rounds  - Make Fall Risk Sign visible to staff  - Apply yellow socks and bracelet for high fall risk patients  - Consider moving patient to room near nurses station  Outcome: Progressing     Problem: Nutrition/Hydration-ADULT  Goal: Nutrient/Hydration intake appropriate for improving, restoring or maintaining nutritional needs  Description: Monitor and assess patient's nutrition/hydration status for malnutrition  Collaborate with interdisciplinary team and initiate plan and interventions as ordered  Monitor patient's weight and dietary intake as ordered or per policy  Utilize nutrition screening tool and intervene as necessary  Determine patient's food preferences and provide high-protein, high-caloric foods as appropriate       INTERVENTIONS:  - Monitor oral intake, urinary output, labs, and treatment plans  - Assess nutrition and hydration status and recommend course of action  - Evaluate amount of meals eaten  - Assist patient with eating if necessary   - Allow adequate time for meals  - Recommend/ encourage appropriate diets, oral nutritional supplements, and vitamin/mineral supplements  - Order, calculate, and assess calorie counts as needed  - Recommend, monitor, and adjust tube feedings and TPN/PPN based on assessed needs  - Assess need for intravenous fluids  - Provide specific nutrition/hydration education as appropriate  - Include patient/family/caregiver in decisions related to nutrition  Outcome: Progressing     Problem: PAIN - ADULT  Goal: Verbalizes/displays adequate comfort level or baseline comfort level  Description: Interventions:  - Encourage patient to monitor pain and request assistance  - Assess pain using appropriate pain scale  - Administer analgesics based on type and severity of pain and evaluate response  - Implement non-pharmacological measures as appropriate and evaluate response  - Consider cultural and social influences on pain and pain management  - Notify physician/advanced practitioner if interventions unsuccessful or patient reports new pain  Outcome: Progressing     Problem: INFECTION - ADULT  Goal: Absence or prevention of progression during hospitalization  Description: INTERVENTIONS:  - Assess and monitor for signs and symptoms of infection  - Monitor lab/diagnostic results  - Monitor all insertion sites, i e  indwelling lines, tubes, and drains  - Monitor endotracheal if appropriate and nasal secretions for changes in amount and color  - Cantril appropriate cooling/warming therapies per order  - Administer medications as ordered  - Instruct and encourage patient and family to use good hand hygiene technique  - Identify and instruct in appropriate isolation precautions for identified infection/condition  Outcome: Progressing  Goal: Absence of fever/infection during neutropenic period  Description: INTERVENTIONS:  - Monitor WBC    Outcome: Progressing     Problem: SAFETY ADULT  Goal: Patient will remain free of falls  Description: INTERVENTIONS:  - Educate patient/family on patient safety including physical limitations  - Instruct patient to call for assistance with activity   - Consult OT/PT to assist with strengthening/mobility   - Keep Call bell within reach  - Keep bed low and locked with side rails adjusted as appropriate  - Keep care items and personal belongings within reach  - Initiate and maintain comfort rounds  - Make Fall Risk Sign visible to staff  - Apply yellow socks and bracelet for high fall risk patients  - Consider moving patient to room near nurses station  Outcome: Progressing  Goal: Maintain or return to baseline ADL function  Description: INTERVENTIONS:  -  Assess patient's ability to carry out ADLs; assess patient's baseline for ADL function and identify physical deficits which impact ability to perform ADLs (bathing, care of mouth/teeth, toileting, grooming, dressing, etc )  - Assess/evaluate cause of self-care deficits   - Assess range of motion  - Assess patient's mobility; develop plan if impaired  - Assess patient's need for assistive devices and provide as appropriate  - Encourage maximum independence but intervene and supervise when necessary  - Involve family in performance of ADLs  - Assess for home care needs following discharge   - Consider OT consult to assist with ADL evaluation and planning for discharge  - Provide patient education as appropriate  Outcome: Progressing  Goal: Maintains/Returns to pre admission functional level  Description: INTERVENTIONS:  - Perform BMAT or MOVE assessment daily    - Set and communicate daily mobility goal to care team and patient/family/caregiver     - Collaborate with rehabilitation services on mobility goals if consulted  - Out of bed for toileting  - Record patient progress and toleration of activity level   Outcome: Progressing     Problem: DISCHARGE PLANNING  Goal: Discharge to home or other facility with appropriate resources  Description: INTERVENTIONS:  - Identify barriers to discharge w/patient and caregiver  - Arrange for needed discharge resources and transportation as appropriate  - Identify discharge learning needs (meds, wound care, etc )  - Arrange for interpretive services to assist at discharge as needed  - Refer to Case Management Department for coordinating discharge planning if the patient needs post-hospital services based on physician/advanced practitioner order or complex needs related to functional status, cognitive ability, or social support system  Outcome: Progressing     Problem: Knowledge Deficit  Goal: Patient/family/caregiver demonstrates understanding of disease process, treatment plan, medications, and discharge instructions  Description: Complete learning assessment and assess knowledge base  Interventions:  - Provide teaching at level of understanding  - Provide teaching via preferred learning methods  Outcome: Progressing     Problem: MOBILITY - ADULT  Goal: Maintain or return to baseline ADL function  Description: INTERVENTIONS:  -  Assess patient's ability to carry out ADLs; assess patient's baseline for ADL function and identify physical deficits which impact ability to perform ADLs (bathing, care of mouth/teeth, toileting, grooming, dressing, etc )  - Assess/evaluate cause of self-care deficits   - Assess range of motion  - Assess patient's mobility; develop plan if impaired  - Assess patient's need for assistive devices and provide as appropriate  - Encourage maximum independence but intervene and supervise when necessary  - Involve family in performance of ADLs  - Assess for home care needs following discharge   - Consider OT consult to assist with ADL evaluation and planning for discharge  - Provide patient education as appropriate  Outcome: Progressing  Goal: Maintains/Returns to pre admission functional level  Description: INTERVENTIONS:  - Perform BMAT or MOVE assessment daily    - Set and communicate daily mobility goal to care team and patient/family/caregiver     - Collaborate with rehabilitation services on mobility goals if consulted  - Out of bed for toileting  - Record patient progress and toleration of activity level   Outcome: Progressing     Problem: Prexisting or High Potential for Compromised Skin Integrity  Goal: Skin integrity is maintained or improved  Description: INTERVENTIONS:  - Identify patients at risk for skin breakdown  - Assess and monitor skin integrity  - Assess and monitor nutrition and hydration status  - Monitor labs   - Assess for incontinence   - Turn and reposition patient  - Assist with mobility/ambulation  - Relieve pressure over bony prominences  - Avoid friction and shearing  - Provide appropriate hygiene as needed including keeping skin clean and dry  - Evaluate need for skin moisturizer/barrier cream  - Collaborate with interdisciplinary team   - Patient/family teaching  - Consider wound care consult   Outcome: Progressing

## 2022-08-02 NOTE — ASSESSMENT & PLAN NOTE
· STEMI 10/22/21 - PATRICA to mid circumflex  · VT arrest 10/26   · Polymorphic VT x 1 shock 10/28/21  · ICD not placed given risks felt to outweigh benefits with cognitive function and risk of infection at that that time  · Cardiac arrest 1/1/22 - likely VT related - went to [facility] - [facility]  · No ICD given lung infection and on IV ABX x 4 weeks  · Canceled 2 EP appts since that time and thus no ICD in place - still wears LifeVest  · Most recent EF improved to 50%

## 2022-08-02 NOTE — ASSESSMENT & PLAN NOTE
· Purulent discharge noted at site of recent chest tube placement  · Cultures positive for MRSA  · Currently on IV vancomycin - will continue  · Continue local wound care/dressing changes

## 2022-08-03 ENCOUNTER — APPOINTMENT (INPATIENT)
Dept: RADIOLOGY | Facility: HOSPITAL | Age: 57
DRG: 853 | End: 2022-08-03
Payer: COMMERCIAL

## 2022-08-03 LAB
ALBUMIN SERPL BCP-MCNC: 1.8 G/DL (ref 3.5–5)
ALP SERPL-CCNC: 81 U/L (ref 46–116)
ALT SERPL W P-5'-P-CCNC: 18 U/L (ref 12–78)
ANION GAP SERPL CALCULATED.3IONS-SCNC: 5 MMOL/L (ref 4–13)
AST SERPL W P-5'-P-CCNC: 33 U/L (ref 5–45)
BILIRUB SERPL-MCNC: 0.27 MG/DL (ref 0.2–1)
BUN SERPL-MCNC: 39 MG/DL (ref 5–25)
CALCIUM ALBUM COR SERPL-MCNC: 10.7 MG/DL (ref 8.3–10.1)
CALCIUM SERPL-MCNC: 8.9 MG/DL (ref 8.3–10.1)
CHLORIDE SERPL-SCNC: 101 MMOL/L (ref 96–108)
CO2 SERPL-SCNC: 26 MMOL/L (ref 21–32)
CREAT SERPL-MCNC: 1.34 MG/DL (ref 0.6–1.3)
ERYTHROCYTE [DISTWIDTH] IN BLOOD BY AUTOMATED COUNT: 18.1 % (ref 11.6–15.1)
GFR SERPL CREATININE-BSD FRML MDRD: 44 ML/MIN/1.73SQ M
GLUCOSE SERPL-MCNC: 134 MG/DL (ref 65–140)
GLUCOSE SERPL-MCNC: 180 MG/DL (ref 65–140)
GLUCOSE SERPL-MCNC: 199 MG/DL (ref 65–140)
GLUCOSE SERPL-MCNC: 255 MG/DL (ref 65–140)
GLUCOSE SERPL-MCNC: 260 MG/DL (ref 65–140)
GLUCOSE SERPL-MCNC: 84 MG/DL (ref 65–140)
GLUCOSE SERPL-MCNC: 98 MG/DL (ref 65–140)
HCT VFR BLD AUTO: 27.4 % (ref 34.8–46.1)
HGB BLD-MCNC: 7.9 G/DL (ref 11.5–15.4)
MCH RBC QN AUTO: 22.4 PG (ref 26.8–34.3)
MCHC RBC AUTO-ENTMCNC: 28.8 G/DL (ref 31.4–37.4)
MCV RBC AUTO: 78 FL (ref 82–98)
PLATELET # BLD AUTO: 611 THOUSANDS/UL (ref 149–390)
PMV BLD AUTO: 9.9 FL (ref 8.9–12.7)
POTASSIUM SERPL-SCNC: 3.7 MMOL/L (ref 3.5–5.3)
PREALB SERPL-MCNC: 15 MG/DL (ref 18–40)
PROCALCITONIN SERPL-MCNC: 0.46 NG/ML
PROT SERPL-MCNC: 8.3 G/DL (ref 6.4–8.4)
RBC # BLD AUTO: 3.52 MILLION/UL (ref 3.81–5.12)
SODIUM SERPL-SCNC: 132 MMOL/L (ref 135–147)
WBC # BLD AUTO: 9.84 THOUSAND/UL (ref 4.31–10.16)

## 2022-08-03 PROCEDURE — 82948 REAGENT STRIP/BLOOD GLUCOSE: CPT

## 2022-08-03 PROCEDURE — 94762 N-INVAS EAR/PLS OXIMTRY CONT: CPT

## 2022-08-03 PROCEDURE — 85027 COMPLETE CBC AUTOMATED: CPT | Performed by: FAMILY MEDICINE

## 2022-08-03 PROCEDURE — 94760 N-INVAS EAR/PLS OXIMETRY 1: CPT

## 2022-08-03 PROCEDURE — 71045 X-RAY EXAM CHEST 1 VIEW: CPT

## 2022-08-03 PROCEDURE — 84145 PROCALCITONIN (PCT): CPT | Performed by: FAMILY MEDICINE

## 2022-08-03 PROCEDURE — 99223 1ST HOSP IP/OBS HIGH 75: CPT | Performed by: FAMILY MEDICINE

## 2022-08-03 PROCEDURE — 99223 1ST HOSP IP/OBS HIGH 75: CPT | Performed by: INTERNAL MEDICINE

## 2022-08-03 PROCEDURE — 94640 AIRWAY INHALATION TREATMENT: CPT

## 2022-08-03 PROCEDURE — 99232 SBSQ HOSP IP/OBS MODERATE 35: CPT | Performed by: INTERNAL MEDICINE

## 2022-08-03 PROCEDURE — 80053 COMPREHEN METABOLIC PANEL: CPT | Performed by: FAMILY MEDICINE

## 2022-08-03 PROCEDURE — 99223 1ST HOSP IP/OBS HIGH 75: CPT | Performed by: THORACIC SURGERY (CARDIOTHORACIC VASCULAR SURGERY)

## 2022-08-03 RX ORDER — INSULIN LISPRO 100 [IU]/ML
20 INJECTION, SOLUTION INTRAVENOUS; SUBCUTANEOUS
Status: DISCONTINUED | OUTPATIENT
Start: 2022-08-03 | End: 2022-08-09

## 2022-08-03 RX ADMIN — INSULIN GLARGINE 45 UNITS: 100 INJECTION, SOLUTION SUBCUTANEOUS at 21:46

## 2022-08-03 RX ADMIN — BUMETANIDE 2 MG: 2 TABLET ORAL at 08:09

## 2022-08-03 RX ADMIN — PREGABALIN 75 MG: 75 CAPSULE ORAL at 08:09

## 2022-08-03 RX ADMIN — ACETAMINOPHEN 975 MG: 325 TABLET ORAL at 14:29

## 2022-08-03 RX ADMIN — INSULIN LISPRO 6 UNITS: 100 INJECTION, SOLUTION INTRAVENOUS; SUBCUTANEOUS at 17:46

## 2022-08-03 RX ADMIN — IPRATROPIUM BROMIDE 0.5 MG: 0.5 SOLUTION RESPIRATORY (INHALATION) at 20:02

## 2022-08-03 RX ADMIN — ATORVASTATIN CALCIUM 40 MG: 40 TABLET, FILM COATED ORAL at 17:38

## 2022-08-03 RX ADMIN — DIAZEPAM 5 MG: 5 TABLET ORAL at 21:46

## 2022-08-03 RX ADMIN — METOPROLOL SUCCINATE 50 MG: 50 TABLET, EXTENDED RELEASE ORAL at 17:39

## 2022-08-03 RX ADMIN — GUAIFENESIN 1200 MG: 600 TABLET, EXTENDED RELEASE ORAL at 08:08

## 2022-08-03 RX ADMIN — TICAGRELOR 90 MG: 90 TABLET ORAL at 06:56

## 2022-08-03 RX ADMIN — METOPROLOL SUCCINATE 50 MG: 50 TABLET, EXTENDED RELEASE ORAL at 06:56

## 2022-08-03 RX ADMIN — APIXABAN 5 MG: 5 TABLET, FILM COATED ORAL at 08:08

## 2022-08-03 RX ADMIN — SPIRONOLACTONE 100 MG: 50 TABLET ORAL at 08:08

## 2022-08-03 RX ADMIN — PREGABALIN 25 MG: 25 CAPSULE ORAL at 17:38

## 2022-08-03 RX ADMIN — ACETAMINOPHEN 975 MG: 325 TABLET ORAL at 21:44

## 2022-08-03 RX ADMIN — HYDROMORPHONE HYDROCHLORIDE 2 MG: 2 TABLET ORAL at 08:09

## 2022-08-03 RX ADMIN — LEVALBUTEROL HYDROCHLORIDE 1.25 MG: 1.25 SOLUTION, CONCENTRATE RESPIRATORY (INHALATION) at 20:02

## 2022-08-03 RX ADMIN — LEVALBUTEROL HYDROCHLORIDE 1.25 MG: 1.25 SOLUTION, CONCENTRATE RESPIRATORY (INHALATION) at 07:49

## 2022-08-03 RX ADMIN — INSULIN LISPRO 2 UNITS: 100 INJECTION, SOLUTION INTRAVENOUS; SUBCUTANEOUS at 11:57

## 2022-08-03 RX ADMIN — HYDROMORPHONE HYDROCHLORIDE 2 MG: 2 TABLET ORAL at 17:48

## 2022-08-03 RX ADMIN — VANCOMYCIN HYDROCHLORIDE 1250 MG: 10 INJECTION, POWDER, LYOPHILIZED, FOR SOLUTION INTRAVENOUS at 14:30

## 2022-08-03 RX ADMIN — INSULIN LISPRO 20 UNITS: 100 INJECTION, SOLUTION INTRAVENOUS; SUBCUTANEOUS at 17:46

## 2022-08-03 RX ADMIN — INSULIN LISPRO 2 UNITS: 100 INJECTION, SOLUTION INTRAVENOUS; SUBCUTANEOUS at 21:47

## 2022-08-03 RX ADMIN — HYDROMORPHONE HYDROCHLORIDE 2 MG: 2 TABLET ORAL at 12:09

## 2022-08-03 RX ADMIN — PANTOPRAZOLE SODIUM 40 MG: 40 TABLET, DELAYED RELEASE ORAL at 06:56

## 2022-08-03 RX ADMIN — IPRATROPIUM BROMIDE 0.5 MG: 0.5 SOLUTION RESPIRATORY (INHALATION) at 14:07

## 2022-08-03 RX ADMIN — ESCITALOPRAM OXALATE 10 MG: 10 TABLET ORAL at 08:10

## 2022-08-03 RX ADMIN — AMIODARONE HYDROCHLORIDE 100 MG: 200 TABLET ORAL at 08:10

## 2022-08-03 RX ADMIN — IPRATROPIUM BROMIDE 0.5 MG: 0.5 SOLUTION RESPIRATORY (INHALATION) at 07:49

## 2022-08-03 RX ADMIN — DIAZEPAM 5 MG: 5 TABLET ORAL at 14:37

## 2022-08-03 RX ADMIN — LEVALBUTEROL HYDROCHLORIDE 1.25 MG: 1.25 SOLUTION, CONCENTRATE RESPIRATORY (INHALATION) at 14:07

## 2022-08-03 NOTE — UTILIZATION REVIEW
Initial Clinical Review    Admission: Date/Time/Statement:   Admission Orders (From admission, onward)     Ordered        08/02/22 2233  Inpatient Admission  Once                      Orders Placed This Encounter   Procedures    Inpatient Admission     Standing Status:   Standing     Number of Occurrences:   1     Order Specific Question:   Level of Care     Answer:   Med Surg [16]     Comments:   with Telemetry     Order Specific Question:   Estimated length of stay     Answer:   More than 2 Midnights     Order Specific Question:   Certification     Answer:   I certify that inpatient services are medically necessary for this patient for a duration of greater than two midnights  See H&P and MD Progress Notes for additional information about the patient's course of treatment  Initial Presentation: 65 y o  female with PMHx of CKD III, CAD s/p arrest, PCI on Brilanta, HFrEF (50%), A-fib on Eliquis, uncontrolled T2 DM, chronic trach on 6L O2 at baseline [facility] from [address] [place] [address] where she initially presented on 7/19 with cp and sob found to have PTX s/p CT placement, tx'd with IV abx  CT insertion with removal 7/24, with CT chest 7/28 with moderate partially loculated right pleural effusion and re-placed on 7/29 in IR  Pleural fluid analysis showed neutrophil predominant WBC count  Fluid cultures from prior anterior chest tube site positive for MRSA  IV abx per ID  Now tx'd to [facility] for VATS procedure  ADMIT INPATIENT to M/S/TELE UNIT with R EMPYEMA -- Continue Abx w/ Vanc given MRSA, Cefepime d/c'd - Vanc will need to be continued through 8/16 per ID  Continue Chest tube management per Pulmonology, reconsulted  Cardiothoracic team consulted for VATS procedure  ID and pharmacy reconsulted  Monitor fever curve and WBCs  Pt had pain with previous CT, had epidural cath removed on 8/2   Continue current pain regimen: Diaz Tylenol 975 Q8h, PO Dilaudid 2/4 for mod/severe, IV Dilaudid 0 5 mg Q2  Bowel regimen  On trach collar O2 with humidification, 6-8L, continue trach care  Continue Lantus and accuchecks w/ ssi  Previous po meds, udns  Monitor creatinine  Avoid nephrotoxic agents    Thoracic surgery consult 8/3 -- Empyema -- Plan: Obtain CXR  Recommend cardiology consult to discuss holding Eliquis and Brilinta preoperatively in the setting of A  Fib and her h/o PCI/CAD/arrest  Would also like their recommendations for pre-operative optimization given her significant cardiac history  Consult anesthesia for pre-op eval  Recommend continuing IV vancomycin  CT to -40 suction  ID consult 8/3 -- A: probably R-sided empyema, developing sepsis, MRSA R chest wall infection -- continue IV vancomycin  Monitor temps/WBC curve  Continue CT drainage per Pulm/IR  Plan for [facility] with thoracic surgery    Cardiology 8/3 -- No current cardiac contraindications to proceeding with outlined surgical plan per the thoracic surgery team  Yolie Ramírez to place Eliquis on hold for at least 2 days prior to surgery  NSR on telemetry  Continue amiodarone and metoprolol  Strict I/Os's, daily wts, 2 g NA diet, 1800 ml/day fluid restriction  Monitor electrolytes, replete to maintain K +level 4 0 and mag at 2 0  Re-application of Life-Vest on d/c  Date: 8/3  Day 2: pt remains on 8L O2 trach collar (baseline 5-6L)  Continue to monitor O2 sat and wean as michelle  Continue supportive care with plan for [facility] when medically cleared  SCDs   Po meds    Wt Readings from Last 1 Encounters:   08/02/22 77 7 kg (171 lb 6 4 oz)     Vital Signs:   Date/Time Temp Pulse Resp BP MAP (mmHg) SpO2 FiO2 (%) Calculated FIO2 (%) - Nasal Cannula O2 Flow Rate (L/min) Nasal Cannula O2 Flow Rate (L/min) O2 Device   08/03/22 08:07:18 -- 70 -- 119/74 89 98 % -- -- -- -- --   08/03/22 0749 -- -- -- -- -- 97 % 28 -- 8 L/min -- Trach mask   08/03/22 07:24:29 97 3 °F (36 3 °C) Abnormal  64 16 119/74 89 98 % -- -- -- -- --   08/03/22 06:46:59 -- 61 -- 100/70 80 98 % -- -- -- -- --   08/03/22 02:16:33 97 5 °F (36 4 °C) 56 16 101/60 74 97 % 28 -- -- -- Trach mask   08/02/22 22:26:58 98 3 °F (36 8 °C) 68 20 132/80 97 99 % -- -- -- -- --   08/02/22 2127 -- -- -- -- -- -- 28  60 -- 10 L/min Trach mask   FiO2 (%): decrease FIO2 28% at 08/02/22 2127 08/02/22 2115 -- -- -- -- -- -- -- -- -- -- Trach mask       Pertinent Labs/Diagnostic Test Results:   XR chest portable   Final Result by Jermain Solis MD (08/03 1054)      Improving right midlung field and bibasilar consolidation and effusion  Trace left pleural effusion persists  Stable position of right basilar pleural drainage catheter and tracheostomy  · 8/1 - CXR: Small component of right pleural effusion suspected which may be partially loculated   Indwelling right thoracostomy tube without pneumothorax  · 8/2 CT Chest:  Decreased size of a right-sided pleural effusion and improved aeration of the right lower lobe post placement of a right pleural drainage catheter, with unchanged appearance of a loculated component of the effusion superomedially  There is slightly  increased density of the effusion compatible with a small amount of blood products  2   Appearance of new inflammatory groundglass opacities in the left lower lobe  3   Mild background interstitial edema is unchanged          Results from last 7 days   Lab Units 08/03/22  0559 08/02/22  4567 08/01/22 2026 08/01/22 0457 07/31/22  0443 07/30/22  0459 07/29/22  0447   WBC Thousand/uL 9 84 14 29*  --  17 46* 16 13* 18 08* 17 26*   HEMOGLOBIN g/dL 7 9* 8 2* 7 7* 7 8* 8 4* 7 6* 8 2*   HEMATOCRIT % 27 4* 27 4* 25 7* 27 8* 28 1* 25 0* 27 5*   PLATELETS Thousands/uL 611* 625*  --  611* 617* 583* 505*   NEUTROS ABS Thousands/µL  --   --   --   --  12 87* 15 42* 12 75*     Results from last 7 days   Lab Units 08/03/22  0559 08/02/22  0634 08/01/22  0457 07/31/22  0443 07/30/22  0459   SODIUM mmol/L 132* 132* 134* 133* 131*   POTASSIUM mmol/L 3 7 3 8 4 1 4 2 4  4   CHLORIDE mmol/L 101 98 99 100 98   CO2 mmol/L 26 27 25 21 24   ANION GAP mmol/L 5 7 10 12 9   BUN mg/dL 39* 35* 40* 49* 50*   CREATININE mg/dL 1 34* 1 14 1 07 1 12 1 19   EGFR ml/min/1 73sq m 44 53 58 55 51   CALCIUM mg/dL 8 9 8 5 8 3* 8 1* 8 1*     Results from last 7 days   Lab Units 08/03/22  0559 08/02/22  0634 07/29/22  0447   AST U/L 33 16 9*   ALT U/L 18 7 6*   ALK PHOS U/L 81 75 91   TOTAL PROTEIN g/dL 8 3 7 8 7 2  7 1   ALBUMIN g/dL 1 8* 2 7* 2 6*   TOTAL BILIRUBIN mg/dL 0 27 0 35 0 55   BILIRUBIN DIRECT mg/dL  --   --  0 12     Results from last 7 days   Lab Units 08/03/22  0816 08/03/22  0654 08/02/22  2059 08/02/22  1538 08/02/22  1103 08/02/22  0738 08/01/22  2105 08/01/22  1512 08/01/22  1110 08/01/22  0715 07/31/22  2115 07/31/22  1753   POC GLUCOSE mg/dl 134 98 105 207* 158* 160* 174* 189* 194* 165* 256* 106     Results from last 7 days   Lab Units 08/03/22  0559 08/02/22  0634 08/01/22  0457 07/31/22  0443 07/30/22  0459 07/29/22  0447 07/28/22  0504   GLUCOSE RANDOM mg/dL 84 138 125 244* 271* 99 205*     Results from last 7 days   Lab Units 08/03/22  0559 07/29/22  0447   PROCALCITONIN ng/ml 0 46* 0 77*     Results from last 7 days   Lab Units 07/29/22  1529 07/28/22  1201   GRAM STAIN RESULT  1+ Polys  No organisms seen 3+ Polys*  2+ Gram positive cocci in pairs*   WOUND CULTURE   --  2+ Growth of Methicillin Resistant Staphylococcus aureus*   BODY FLUID CULTURE, STERILE  No growth  --        Past Medical History:   Diagnosis Date    Anxiety     Aortic aneurysm (HCC)     Arthritis     Depression     Diabetes mellitus (HCC)     Fibromyalgia     GERD (gastroesophageal reflux disease)     GERD (gastroesophageal reflux disease)     H/O cardiovascular stress test 09/2018    no ischemia  EF 70%   H/O echocardiogram 01/2019    EF 60%  Mild LVH  trivial effusion      Hyperlipidemia     Hypertension     Migraines     Psychiatric disorder     anxiety    Uncontrolled hypertension 2/25/2015    Last Assessment & Plan:  BP today above goal <140/90, apparently asymptomatic  Prior BP elevations were attributed to not taking medication  Consider increased medication if persistent on f/u  Present on Admission:   Hyperlipidemia associated with type 2 diabetes mellitus ([facility] )   Hypertension   Anxiety   Type 2 diabetes mellitus with hyperglycemia ([facility])   CAD (coronary artery disease)   A-fib ([facility])   Acute on chronic heart failure with preserved ejection fraction ([facility] )   Chest wall wound infection   Empyema lung, Right   Pain at Chest tube insertion site      CKD (chronic kidney disease) stage 3, GFR 30-59 ml/min ([facility])   Anemia   Acute Respiratory insufficiency on chronic hypoxic respiratory failure   Diabetic peripheral neuropathy ([facility])      Admitting Diagnosis: Empyema lung ([facility]) [J86 [id number]]  Age/Sex: 65 y o  female  Admission Orders:  Scheduled Medications:  acetaminophen, 975 mg, Oral, Q8H [address]  amiodarone, 100 mg, Oral, Daily With Breakfast  apixaban, 5 mg, Oral, BID  atorvastatin, 40 mg, Oral, Daily With Dinner  bumetanide, 2 mg, Oral, BID  escitalopram, 10 mg, Oral, Daily  ferrous sulfate, 325 mg, Oral, Daily With Breakfast  guaiFENesin, 1,200 mg, Oral, Q12H CEFERINO  insulin glargine, 45 Units, Subcutaneous, HS  insulin lispro, 2-12 Units, Subcutaneous, TID AC  insulin lispro, 2-12 Units, Subcutaneous, HS  insulin lispro, 22 Units, Subcutaneous, TID With Meals  ipratropium, 0 5 mg, Nebulization, TID  levalbuterol, 1 25 mg, Nebulization, TID  lidocaine, 2 patch, Topical, Daily  metoprolol succinate, 50 mg, Oral, Q12H  pantoprazole, 40 mg, Oral, Early Morning  polyethylene glycol, 17 g, Oral, Daily  pregabalin, 25 mg, Oral, QPM  pregabalin, 75 mg, Oral, Daily  senna-docusate sodium, 1 tablet, Oral, BID  spironolactone, 100 mg, Oral, Daily  ticagrelor, 90 mg, Oral, Q12H  vancomycin, 1,250 mg, Intravenous, Q24H    PRN Meds:  albuterol, 2 5 mg, Nebulization, Q4H PRN  albuterol, 2 5 mg, Nebulization, Q4H PRN  benzonatate, 100 mg, Oral, TID PRN  diazepam, 5 mg, Oral, Q6H PRN  HYDROmorphone, 0 5 mg, Intravenous, Q2H PRN  HYDROmorphone, 2 mg, Oral, Q4H PRN 8/3 x1  HYDROmorphone, 4 mg, Oral, Q4H PRN  hydrOXYzine HCL, 25 mg, Oral, Q6H PRN  trimethobenzamide, 200 mg, Intramuscular, Q6H PRN        IP CONSULT TO PHARMACY  IP CONSULT TO CARDIOTHORACIC SURGERY  IP CONSULT TO PULMONOLOGY  IP CONSULT TO CARDIOLOGY  IP CONSULT TO ANESTHESIOLOGY  IP CONSULT TO INFECTIOUS DISEASES    Network Utilization Review Department  ATTENTION: Please call with any questions or concerns to [phone] and carefully listen to the prompts so that you are directed to the right person  All voicemails are confidential   Lynette Kussmaul all requests for admission clinical reviews, approved or denied determinations and any other requests to dedicated fax number below belonging to the campus where the patient is receiving treatment   List of dedicated fax numbers for the Facilities:  [address] DENIALS (Administrative/Medical Necessity) [phone]   [address] (Maternity/NICU/Pediatrics) [phone]   [address]  [address] [facility] [place] [address] [address] [place] [address] [address]ara Velázquez [address] [facility] [place] [address] [phone]

## 2022-08-03 NOTE — CONSULTS
Consultation - Infectious Disease   Jessica Chaudhry 65 y o  female MRN: [id number]  Unit/Bed#: [facility] 429-01 Encounter: [id number]      IMPRESSION & RECOMMENDATIONS:   Impression/Recommendations: This is a 65 y o  female, with multiple medical problems outlined below, status post placement of right-sided chest tube for tension pneumothorax, complicated by empyema  She has a new chest tube in place but has persistent loculated empyema  She is now being evaluated for VATS/decortication  1  Probable right-sided empyema   CT show loculated right pleural effusion   Patient is status post placement of chest tube on 07/29, with pleural fluid parameters consistent with empyema   Culture has no growth, likely due to prior antibiotic   Given MRSA in right chest wound, MRSA is likely pathogen in empyema  Despite chest tube drainage, repeat chest CT on 08/02 showed persistent and unchanged loculated empyema  Thoracic surgery evaluation noted, with plan for VATS/decortication in progress  Continue IV vancomycin  Monitor temperature/WBC  Continue chest tube drainage per Pulmonary/IR  Thoracic surgery's plan for VATS/decortication noted      2  Developing sepsis, with leukocytosis and tachypnea, likely secondary to empyema above  Patient is systemically improved  WBC has normalized  Tachypnea resolved  Joni Norton Antibiotic plan as in below  Monitor temperature/WBC  Monitor hemodynamics      3  MRSA right chest wall infection, as site recent chest tube placement for spontaneous pneumothorax  Antibiotic plan as in above  Serial exams      4  Acute on chronic hypoxic respiratory failure, likely multifactorial   Respiratory culture with MRSA and Pseudomonas but no obvious pneumonia   These isolates are most likely airway colonization    Management per primary service      5  Recent spontaneous pneumothorax, status post chest tube placement on 07/20   This was removed on 07/24      6  DM, type 2, poorly controlled, with hyperglycemia and elevated hemoglobin A1c   This is risk factor for infection above  Management per primary service      7  MODESTO   Creatinine has been improving, although up today  Antibiotic dosages adjusted accordingly  Monitor creatinine      Discussed with patient in detail regarding the above plan      Antibiotics:  Vancomycin # 8    Thank you for this consultation  We will follow along with you  HISTORY OF PRESENT ILLNESS:  Reason for Consult:  Empyema  HPI: Jane Hicks is a 65 y o  female, with multiple medical problems including MI, status post cardiac arrest, chronic tracheostomy and life vest, CHF, poorly controlled DM, thoracic aortic aneurysm, COPD, initially presented to [facility] in on 07/15 with chest pain and shortness of breath  Patient was subsequent found to have tension pneumothorax on the right side  She was transferred to [facility] where chest tube was placed on 07/20  This was removed on 07/24 due to resolution of pneumothorax  On 07/26, patient developed sepsis with purulent drainage from prior chest tube site  Patient was started on vancomycin/cefepime  CT showed loculated right pleural effusion  Chest tube was placed, with pleural fluid parameters consistent with empyema  Pleural fluid culture had no growth but purulence from chest tube site grew MRSA  IV vancomycin was continue  Cefepime was discontinued  Chest CT was done yesterday, with lack of improvement of loculated right empyema  Therefore, patient was transferred to [facility] for thoracic surgery evaluation  At present, patient feels about the same  She has stable discomfort at chest tube site  Stable mild dyspnea and cough  No chills  REVIEW OF SYSTEMS:  A complete system-based review was done  Except for what is noted in HPI above, ROS of systems is otherwise negative      PAST MEDICAL HISTORY:  Past Medical History:   Diagnosis Date    Anxiety     Aortic aneurysm ([facility] )     Arthritis     Depression     Diabetes mellitus ([facility] )     Fibromyalgia     GERD (gastroesophageal reflux disease)     GERD (gastroesophageal reflux disease)     H/O cardiovascular stress test 2018    no ischemia  EF 70%   H/O echocardiogram 2019    EF 60%  Mild LVH  trivial effusion   Hyperlipidemia     Hypertension     Migraines     Psychiatric disorder     anxiety    Uncontrolled hypertension 2015    Last Assessment & Plan:  BP today above goal <140/90, apparently asymptomatic  Prior BP elevations were attributed to not taking medication  Consider increased medication if persistent on f/u  Past Surgical History:   Procedure Laterality Date    BACK SURGERY      Lumbar epidural steroid injection    CARDIAC CATHETERIZATION N/A 10/22/2021    Procedure: Cardiac pci;  Surgeon: Tariq Hirsch MD;  [facility]; Service: Cardiology    CARDIAC CATHETERIZATION  10/22/2021    Procedure: Cardiac catheterization;  Surgeon: Tariq Hirsch MD;  [facility]; Service: Cardiology    CARDIAC CATHETERIZATION Left 10/27/2021    Procedure: Cardiac Left Heart Cath;  Surgeon: Ketty Raygoza MD;  [facility]; Service: Cardiology    CARDIAC CATHETERIZATION N/A 10/27/2021    Procedure: Cardiac pci;  Surgeon: Ketty Raygoza MD;  [facility]; Service: Cardiology    CARDIAC CATHETERIZATION N/A 10/28/2021    Procedure: Cardiac pci;  Surgeon: Sommer Yen DO;  [facility];   Service: Cardiology    CARPAL TUNNEL RELEASE Left      SECTION      CHOLECYSTECTOMY      COLONOSCOPY      incomplete    COLONOSCOPY      EYE SURGERY      HYSTERECTOMY      Total    IR CHEST TUBE PLACEMENT  2022    IR CHEST TUBE PLACEMENT  2022    IR CHEST TUBE PLACEMENT  2022    OVARIAN CYST REMOVAL      PEG W/TRACHEOSTOMY PLACEMENT N/A 2021    Procedure: TRACHEOSTOMY WITH INSERTION PEG TUBE;  Surgeon: J Luis Ley To, DO;  Location: [facility] MAIN OR;  Service: General    TUBAL LIGATION      UPPER GASTROINTESTINAL ENDOSCOPY       Problem list reviewed  FAMILY HISTORY:  Non-contributory    SOCIAL HISTORY:  Social History     Substance and Sexual Activity   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  Social History     Substance and Sexual Activity   Drug Use Yes    Types: Marijuana    Comment: medical; seldom use     Social History     Tobacco Use   Smoking Status Former Smoker    Packs/day: 1 00    Years: 30 00    Pack years: 30 00    Types: Cigarettes   Smokeless Tobacco Never Used       ALLERGIES:  Allergies   Allergen Reactions    Metformin Diarrhea    Clonidine Rash     Patch        MEDICATIONS:  All current active medications have been reviewed  Patient is currently on IV vancomycin  PHYSICAL EXAM:  Vitals:  Temp:  [97 3 °F (36 3 °C)-98 4 °F (36 9 °C)] 98 3 °F (36 8 °C)  HR:  [56-70] 70  Resp:  [14-20] 16  BP: (100-132)/(60-80) 123/75  SpO2:  [94 %-99 %] 98 %  Temp (24hrs), Av 9 °F (36 6 °C), Min:97 3 °F (36 3 °C), Max:98 4 °F (36 9 °C)  Current: Temperature: 98 3 °F (36 8 °C)     Physical Exam:  General:  Well-nourished, well-developed, in no acute distress  Awake, alert and oriented x 3  Eyes:  Conjunctive clear with no hemorrhages or effusions  Oropharynx:  No ulcers, no lesions, pharynx benign, no tonsillitis  Neck:  Supple, no lymphadenopathy, no mass, nontender  Lungs:  Decreased breath sounds on right, right basilar rhonchi and rales, no wheezing, no accessory muscle use  Cardiac:  Regular rate and rhythm, normal S1, normal S2, no murmurs  Abdomen:  Soft, nondistended, non-tender, no HSM  Extremities:  No edema, no erythema, nontender   No ulcers  Skin:  No rashes, no ulcers  Neurological:  Moves all four extremities spontaneously, sensation grossly intact    LABS, IMAGING, & OTHER STUDIES:  Lab Results:  I have personally reviewed pertinent labs   Results from last 7 days   Lab Units 08/03/22  0559 08/02/22  9704 08/01/22  0457 07/30/22  0459 07/29/22  0447   POTASSIUM mmol/L 3 7 3 8 4 1   < > 4 3   CHLORIDE mmol/L 101 98 99   < > 96   CO2 mmol/L 26 27 25   < > 26   BUN mg/dL 39* 35* 40*   < > 47*   CREATININE mg/dL 1 34* 1 14 1 07   < > 1 33*   EGFR ml/min/1 73sq m 44 53 58   < > 44   CALCIUM mg/dL 8 9 8 5 8 3*   < > 8 5   AST U/L 33 16  --   --  9*   ALT U/L 18 7  --   --  6*   ALK PHOS U/L 81 75  --   --  91    < > = values in this interval not displayed  Results from last 7 days   Lab Units 08/03/22  0559 08/02/22  0634 08/01/22 2026 08/01/22  0457   WBC Thousand/uL 9 84 14 29*  --  17 46*   HEMOGLOBIN g/dL 7 9* 8 2* 7 7* 7 8*   PLATELETS Thousands/uL 611* 625*  --  611*     Results from last 7 days   Lab Units 07/29/22  1529 07/28/22  1201   GRAM STAIN RESULT  1+ Polys  No organisms seen 3+ Polys*  2+ Gram positive cocci in pairs*   WOUND CULTURE   --  2+ Growth of Methicillin Resistant Staphylococcus aureus*   BODY FLUID CULTURE, STERILE  No growth  --        Imaging Studies:   I have personally reviewed pertinent imaging study reports and images in PACS  8/2 chest CT reviewed personally  Persistent loculated right pleural effusion  EKG, Pathology, and Other Studies:   I have personally reviewed pertinent reports

## 2022-08-03 NOTE — UTILIZATION REVIEW
Inpatient Admission Authorization Request   NOTIFICATION OF INPATIENT ADMISSION/INPATIENT AUTHORIZATION REQUEST   SERVICING FACILITY:   [facility]  Address: [address] [place] [address], [address] [place] [address]  Tax ID: [id number]  NPI: [id number]  Place of Service: Inpatient [address] [facility] Code: 24     ATTENDING PROVIDER:  Attending Name and NPI#: Terrie Hong [[id number]]  Address: [address] [place] [address], [address] [place] [address]  Phone: [phone]     UTILIZATION REVIEW CONTACT:  Veronica Ring Utilization   Network Utilization Review Department  Phone: [phone]  Fax: [phone]  Email: [email or web address]     PHYSICIAN ADVISORY SERVICES:  FOR [id number] REVIEW - MEDICAL NECESSITY DENIAL  Phone: [phone]  Fax: [phone]  Email: [email or web address]     TYPE OF REQUEST:  Inpatient Status     ADMISSION INFORMATION:  ADMISSION DATE/TIME: 8/2/22  8:31 PM  PATIENT DIAGNOSIS CODE/DESCRIPTION:  Empyema lung ([facility] ) [J86 9]  DISCHARGE DATE/TIME: No discharge date for patient encounter  IMPORTANT INFORMATION:  Please contact Veronica Ring directly with any questions or concerns regarding this request  Department voicemails are confidential     Send requests for admission clinical reviews, concurrent reviews, approvals, and administrative denials due to lack of clinical to fax [phone]

## 2022-08-03 NOTE — ASSESSMENT & PLAN NOTE
· STEMI 10/22/21 - PATRICA to mid circumflex  · VT arrest 10/26   · Polymorphic VT x 1 shock 10/28/21  · ICD not placed given risks felt to outweigh benefits with cognitive function and risk of infection at that that time  · Cardiac arrest 1/1/22 - likely VT related - went to [facility]  · No ICD given lung infection and on IV ABX x 4 weeks  · Canceled 2 EP appts since that time and thus no ICD in place - still wears LifeVest  · Most recent EF improved to 50%

## 2022-08-03 NOTE — ASSESSMENT & PLAN NOTE
· She is status post trach  Currently on 6 L with sats in the high 90s, at home uses 10 L  · Status post recent right pneumothorax requiring chest tube and now with right empyema  · Continue aggressive respiratory protocol, p r n  Suctioning  · Continue Xoponex and Atrovent per Pulm q6hrs  · Encourage out of bed, incentive spirometry  · Pulmonology following  · Repeat CT -  Decreased size of a right-sided pleural effusion and improved aeration of the right lower lobe post placement of a right pleural drainage catheter, with unchanged appearance of a loculated component of the effusion superomedially  There is slightly increased density of the effusion compatible with a small amount of blood products    Appearance of new inflammatory groundglass opacities in the left lower lobe    Mild background interstitial edema is unchanged

## 2022-08-03 NOTE — DISCHARGE SUMMARY
[facility]  Discharge- Jessica Chaudhry 1965, 65 y o  female MRN: [id number]  Unit/Bed#: S -01 Encounter: [id number]  Primary Care Provider: Radha Sorensen MD   Date and time admitted to hospital: 7/19/2022 11:33 AM    * Empyema lung, Right  Assessment & Plan  · Loculated right pleural effusion  She is status post spontaneous right-sided pneumothorax with chest tube placement and subsequent removal  · Most recent CT of the chest on 7/28/22 revealed moderate partially loculated right pleural effusion  · Right chest tube re-placed on 7/29/22 by IR   · Cx tube output about 150 mL over last 24 hrs - 8/1  · Pleural fluid analysis shows neutrophil predominant WBC count  · Fluid cultures from prior anterior chest tube site positive for MRSA  · 8/1 - CXR: Small component of right pleural effusion suspected which may be partially loculated  Indwelling right thoracostomy tube without pneumothorax  · 8/2 CT Chest:  Decreased size of a right-sided pleural effusion and improved aeration of the right lower lobe post placement of a right pleural drainage catheter, with unchanged appearance of a loculated component of the effusion superomedially  There is slightly  increased density of the effusion compatible with a small amount of blood products  2   Appearance of new inflammatory groundglass opacities in the left lower lobe  3   Mild background interstitial edema is unchanged       Plan:  - Continue Abx w/ Vanc Day 7 per ID, Cefepime d/c'd - Vanc will need to be continued until 8/16 per ID  - Continue Chest tube management per Pulmonology  - Case discussed by [facility] attending with Thoracic surgery for transfer to [facility] for evaluation for VATs procedure      Chest wall wound infection  Assessment & Plan  · Purulent discharge noted at site of recent chest tube placement  · Cultures positive for MRSA  · Currently on IV vancomycin - will continue  · Continue local wound care/dressing changes    Acute Respiratory insufficiency on chronic hypoxic respiratory failure  Assessment & Plan  · She is status post trach  Currently on 6 L with sats in the high 90s, at home uses 10 L  · Status post recent right pneumothorax requiring chest tube and now with right empyema  · Continue aggressive respiratory protocol, p r n  Suctioning  · Continue Xoponex and Atrovent per Pulm q6hrs  · Encourage out of bed, incentive spirometry  · Pulmonology following  · Repeat CT -  Decreased size of a right-sided pleural effusion and improved aeration of the right lower lobe post placement of a right pleural drainage catheter, with unchanged appearance of a loculated component of the effusion superomedially  There is slightly increased density of the effusion compatible with a small amount of blood products    Appearance of new inflammatory groundglass opacities in the left lower lobe    Mild background interstitial edema is unchanged  Acute on chronic heart failure with preserved ejection fraction [facility])  Assessment & Plan  Wt Readings from Last 3 Encounters:   08/02/22 77 7 kg (171 lb 6 4 oz)   07/19/22 78 2 kg (172 lb 6 4 oz)   06/29/22 81 7 kg (180 lb 3 2 oz)     · Most recent EF 50%  Currently stable and euvolemic  · On Bumex 2 mg twice daily with Aldactone 100 mg daily - restarted on 7/29  · Continue low-salt diet, monitor I's and O's  · Per Cardiology Re-application of Life Vest upon discharge  · Will need follow-up with General Cardiology & EP service upon discharge for ICD consideration      Pain at Chest tube insertion site  Assessment & Plan  · Continues with intractable pain at prior chest tube site on right anterior chest wall  Chest tube was taken out on 7/24/22  · Currently followed by acute pain service  She is status post ES Catheter insertion  · Currently on multimodal pain regimen with scheduled Tylenol, Lyrica, p r n  Valium, IV and p o  Dilaudid  · Added bowel regimen with senna/Colace b i d  And scheduled daily MiraLax on 8/1  · APS following - ES Catheter Removed today 8/2, infusion disctonued  · Continue on multimodal pain regimen with scheduled Tylenol, Lyrica, p r n  Valium, IV and p o  Dilaudid  · Plan to discontinue IV Dilaudid when Chest tube removed  Acute kidney injury superimposed on chronic kidney disease stage 3 [facility])  Assessment & Plan  Lab Results   Component Value Date    EGFR 53 08/02/2022    EGFR 58 08/01/2022    EGFR 55 07/31/2022    CREATININE 1 14 08/02/2022    CREATININE 1 07 08/01/2022    CREATININE 1 12 07/31/2022   · Creatinine elevated at 1 64 upon transfer on 7/19/22, with baseline being 0 9-1 0  · Resolved  · Continue to monitor closely  Losartan on hold    Hyponatremia  Assessment & Plan  · Corrected sodium for glucose is 135  · No additional workup at this time    Anemia  Assessment & Plan  · Acute on chronic anemia likely related to illness  No overt severe bleeding noted  · Stable    Tracheostomy in place [facility])  Assessment & Plan  · Trach placed in the context of cardiac arrest/prolonged ventilator dependent state November 2021  · Decannulated at [facility] 12/11/21  · Patient requires frequent tracheostomy changes and suctioning  · Per discussion with speech therapy,  video barium swallow was done for sense of food getting stuck  Appropriate for regular diet  · On trach collar O2 with humidification and tolerating well at 6 L       CAD (coronary artery disease)  Assessment & Plan  · STEMI 10/22/2021 s/p PATRICA mid circumflex OM1  OM2 was ballooned but not stented  · Pulseless VT 10/27/21 - repeat C 90% mid circumflex stenosis, but could not be intervened on  · VT 10/28/21 [facility]:  found to have apical LAD complete occlusion was felt to be small to be intervened    · Cardiac carrest 1/1/22 - NO cardiac cath [facility] felt to be hypoxic vs  VT induced  · On beta-blocker, Brilinta and Lipitor      History of Cardiac arrest [facility])  Assessment & Plan  · STEMI 10/22/21 - PATRICA to mid circumflex  · VT arrest 10/26   · Polymorphic VT x 1 shock 10/28/21  · ICD not placed given risks felt to outweigh benefits with cognitive function and risk of infection at that that time  · Cardiac arrest 1/1/22 - likely VT related - went to [facility]  · No ICD given lung infection and on IV ABX x 4 weeks  · Canceled 2 EP appts since that time and thus no ICD in place - still wears LifeVest  · Most recent EF improved to 50%    A-fib [facility])  Assessment & Plan  · On anticoagulation with Eliquis   · Continue amiodarone, Toprol    Type 2 diabetes mellitus with hyperglycemia [facility])  Assessment & Plan  Lab Results   Component Value Date    HGBA1C 12 7 (H) 05/22/2022     Recent Labs     08/01/22  2105 08/02/22  0738 08/02/22  1103 08/02/22  1538   POCGLU 174* 160* 158* 207*     Blood Sugar Average: Last 72 hrs:  · (P) 197 875   · Very poorly controlled based on A1c  · Continue basal/bolus insulin to 45 units qHS and 22 units Humalog TID with meals today  · SSI for coverage as well  · ADA diet, Accu-Cheks a c  HS    Hypokalemia-resolved as of 7/27/2022  Assessment & Plan  · Potassium currently stable  · Goal for K >4 given history of VT cardiac arrest  · K 6 1 on 7/20/22, avoid overcorrection      Primary spontaneous pneumothorax-resolved as of 7/25/2022  Assessment & Plan  · Patient noted on imaging to have small pneumothorax at [facility], subsequently transferred [facility]  · Chest tube placed 7/20, then upsized on 7/22 given no significant change and appearance was placed to gravity       Acute on chronic HFrEF (heart failure with reduced ejection fraction) (HCC)-resolved as of 7/27/2022  Assessment & Plan  Wt Readings from Last 3 Encounters:   08/02/22 77 7 kg (171 lb 6 4 oz)   07/19/22 78 2 kg (172 lb 6 4 oz)   06/29/22 81 7 kg (180 lb 3 2 oz)   · EF noted to be 50% on last echocardiogram 2/22  · Admitted to [facility] for CHF exac on 7/15 - given 80 mg IV lasix and then placed back on bumex 2 mg PO BID   Developed MODESTO and thus bumex dose reduced - got 2 mg on 7/19, no bumex on on 7/20, 1 mg on 7/21, 2 mg on 7/23, 3 mg on 7/23 (home dose had been bumex 2 mg PO BID)  7/24 AM with increasing SOB, oxygen requiements - given 1 mg IV bumex  · CXR showed mild vascular congestion  Suspected iatrogenic volume overload in the setting of reduced/withheld diuretic dosing and received several additional doses of IV Bumex, subsequently held additional doses of diuretics  · Consulted cardiology, appreciate their input  · Continue oral Bumex - re-started on 7/29  · Output - 4100 reported Urine output over last 24 hours  Medical Problems             Resolved Problems  Date Reviewed: 8/2/2022          Resolved    Acute on chronic HFrEF (heart failure with reduced ejection fraction) ([facility] 7/27/2022     Resolved by  Pankaj Gonzalez PA-C    Primary spontaneous pneumothorax 7/25/2022     Resolved by  Pankaj Gonzalez PA-C    Hypokalemia 7/27/2022     Resolved by  Pankaj Gonzalez PA-C              Discharging Physician / Practitioner: Partha Ramirez DO  PCP: Beth Brian MD  Admission Date:   Admission Orders (From admission, onward)     Ordered        07/19/22 1458  Inpatient Admission  Once                      Discharge Date: 08/02/22    Consultations During Hospital Stay:  · Pulmonology, Anesthesia    Procedures Performed:   · Chest Tube, Peripheral Block, CT chest    Significant Findings / Test Results:   · CT Chest:  Decreased size of a right-sided pleural effusion and improved aeration of the right lower lobe post placement of a right pleural drainage catheter, with unchanged appearance of a loculated component of the effusion superomedially  There is slightly   increased density of the effusion compatible with a small amount of blood products  2   Appearance of new inflammatory groundglass opacities in the left lower lobe  3   Mild background interstitial edema is unchanged       Incidental Findings:   · NOne     Test Results Pending at Discharge (will require follow up): · None     Outpatient Tests Requested:  · NOne    Complications:  None     Reason for Admission: Empyema    Hospital Course:   Amanda Lee is a 65 y o  female patient who originally presented to the hospital on 7/19/2022 due to She presented to [facility] for complaints of chest pain and shortness of breath  At that time she was also apparently noted to be coughing  Due to initial suspicion of congestive heart failure she received IV diuretics  During the workup at [facility] she was noted on chest x-ray to have a pneumothorax and was recommended for placement of a chest tube at [facility]  Patient transferred from [facility] for evaluation by IR for chest tube, CXR upon arrival shows stable small apical right PTX  Patient had chest tube placed  Managed by pulmonology and tPA/dornase started on antibiotics initially on vanco and cefepime  Transition to vanco after wound and sputum cultures grew MRSA  Id following and states will need antibiotics through 8/16  Patient had repeat CT of chest done on a to after chest tube draining 1 minimal amounts of red blood  CT scan as above all showing continued loculations  Pulmonology attending, Dr Dorothy Dang had conversation with thoracic surgery of \[facility]\"" about evaluation for possible VATS procedure  Thoracic surgery agrees and the patient is scheduled to transfer to \[facility]\"" for evaluation  Please see above list of diagnoses and related plan for additional information  Condition at Discharge: fair    Discharge Day Visit / Exam:   Subjective:  Patient seen and examined prior to transfer, she understands need for transfer and is amenable  She denies any current complaints      Vitals: Blood Pressure: 117/70 (08/02/22 1903)  Pulse: 67 (08/02/22 1903)  Temperature: 97 5 °F (36 4 °C) (08/02/22 1903)  Temp Source: Oral (07/29/22 2205)  Respirations: 17 (08/02/22 1903)  Weight - Scale: 77 7 kg (171 lb 6 4 oz) (08/02/22 0600)  SpO2: 94 % (08/02/22 1903)  Exam:   Physical Exam  Vitals reviewed  Constitutional:       Appearance: Normal appearance  HENT:      Head: Normocephalic and atraumatic  Right Ear: External ear normal       Left Ear: External ear normal       Nose: Nose normal       Mouth/Throat:      Mouth: Mucous membranes are moist       Pharynx: No oropharyngeal exudate  Eyes:      Extraocular Movements: Extraocular movements intact  Conjunctiva/sclera: Conjunctivae normal       Pupils: Pupils are equal, round, and reactive to light  Neck:      Comments: Trach in place - mucopurulence noted  Cardiovascular:      Rate and Rhythm: Normal rate and regular rhythm  Pulses: Normal pulses  Heart sounds: No murmur heard  Pulmonary:      Effort: Pulmonary effort is normal       Breath sounds: Rhonchi present  No wheezing or rales  Abdominal:      General: Bowel sounds are normal       Palpations: Abdomen is soft  Tenderness: There is no abdominal tenderness  There is no guarding or rebound  Musculoskeletal:      Right lower leg: No edema  Left lower leg: No edema  Skin:     General: Skin is warm  Capillary Refill: Capillary refill takes less than 2 seconds  Findings: No erythema  Neurological:      General: No focal deficit present  Mental Status: She is alert and oriented to person, place, and time  Psychiatric:         Mood and Affect: Mood normal          Behavior: Behavior normal           Discussion with Family: Updated  (son) via phone  Discharge instructions/Information to patient and family:   See after visit summary for information provided to patient and family  Provisions for Follow-Up Care:  See after visit summary for information related to follow-up care and any pertinent home health orders         Disposition:   [address] Transfer to [facility]    Planned Readmission:      Discharge Statement:  I spent 30 minutes discharging the patient  This time was spent on the day of discharge  I had direct contact with the patient on the day of discharge  Greater than 50% of the total time was spent examining patient, answering all patient questions, arranging and discussing plan of care with patient as well as directly providing post-discharge instructions  Additional time then spent on discharge activities  Discharge Medications:  See after visit summary for reconciled discharge medications provided to patient and/or family        **Please Note: This note may have been constructed using a voice recognition system**

## 2022-08-03 NOTE — ASSESSMENT & PLAN NOTE
· Purulent discharge noted at site of recent chest tube placement  · Cultures positive for MRSA on 07/28 susceptible to vanc  · Currently on IV vancomycin - will continue  · Continue local wound care/dressing changes

## 2022-08-03 NOTE — ASSESSMENT & PLAN NOTE
Follows closely with Cardiology  Rhythm controlled with amiodarone 100 mg daily and AC with Eliquis 5 mg BID

## 2022-08-03 NOTE — ASSESSMENT & PLAN NOTE
Wt Readings from Last 3 Encounters:   08/02/22 77 7 kg (171 lb 6 4 oz)   07/19/22 78 2 kg (172 lb 6 4 oz)   06/29/22 81 7 kg (180 lb 3 2 oz)   · EF noted to be 50% on last echocardiogram 2/22  · Admitted to [facility] for CHF exac on 7/15 - given 80 mg IV lasix and then placed back on bumex 2 mg PO BID  Developed MODESTO and thus bumex dose reduced - got 2 mg on 7/19, no bumex on on 7/20, 1 mg on 7/21, 2 mg on 7/23, 3 mg on 7/23 (home dose had been bumex 2 mg PO BID)  7/24 AM with increasing SOB, oxygen requiements - given 1 mg IV bumex  · CXR showed mild vascular congestion  Suspected iatrogenic volume overload in the setting of reduced/withheld diuretic dosing and received several additional doses of IV Bumex, subsequently held additional doses of diuretics  · Consulted cardiology, appreciate their input  · Continue oral Bumex - re-started on 7/29  · Output - 4100 reported Urine output over last 24 hours

## 2022-08-03 NOTE — ASSESSMENT & PLAN NOTE
Results from last 7 days   Lab Units 08/04/22  0454 08/03/22  0559 08/02/22  9829 08/01/22  2026 08/01/22  0457 07/31/22  0443 07/30/22  0459   HEMOGLOBIN g/dL 6 8* 7 9* 8 2* 7 7* 7 8* 8 4* 7 6*   HEMATOCRIT % 23 5* 27 4* 27 4* 25 7* 27 8* 28 1* 25 0*     Most likely anemia of chronic disease due to prolonged illness  No active bleeding noted  Hg (8/4): 6 8 s/p 1 I unit PRBC's   Hb stable, monitor closely  Consider transfusing if < 8 due to history of CAD  Continue Iron replacement as well as daily miralax

## 2022-08-03 NOTE — H&P
[facility]  H&P - Family Medicine  Michelle Roa 1965, 65 y o  female  MRN: [id number]  Unit/Bed#: [facility] [place] 429-01 Encounter: [id number]  Primary Care Provider: Trish Brito MD      Admission Date: 8/2/2022 2031    Code Status:  Level 1 - Full Code  VTE Pharm PPX: Reason for no pharmacologic prophylaxis Eliquis  VTE Mech PPX: sequential compression device    Assessments & Plans:     * Empyema lung, Right  Assessment & Plan  Loculated right pleural effusion  S/P right-sided chest tube insertion and removal on 07/24  CT of the chest on 7/28/22 revealed moderate partially loculated right pleural effusion  Right chest tube re-placed on 7/29/22 by IR  Pleural fluid analysis shows neutrophil predominant WBC count  Fluid cultures from prior anterior chest tube site positive for MRSA  8/1 - CXR: Small component of right pleural effusion suspected which may be partially loculated  Indwelling right thoracostomy tube without pneumothorax  8/2 CT Chest:  Decreased size of a right-sided pleural effusion and improved aeration of the right lower lobe post placement of a right pleural drainage catheter, with unchanged appearance of a loculated component of the effusion superomedially  There is slightly  increased density of the effusion compatible with a small amount of blood products  2   Appearance of new inflammatory groundglass opacities in the left lower lobe  3   Mild background interstitial edema is unchanged  Plan:  - Continue Abx w/ Vanc given MRSA, Cefepime d/c'd - Vanc will need to be continued through 8/16 per ID  - Continue Chest tube management per Pulmonology, reconsulted  - Cardiothoracic team consulted for VATS procedure, follow up recs  - ID and pharmacy also reconsulted  - Monitor fever curve and white count closely    Pain at Chest tube insertion site  Assessment & Plan  Continues with intractable pain at prior chest tube site on right anterior chest wall    This was removed 7/24/22  Was followed by APS back in [place], S/P epidural catheter which was removed 8/2  Current pain regimen: Diaz Tylenol 975 Q8h, PO Dilaudid 2/4 for mod/severe, IV Dilaudid 0 5 mg Q2  Bowel regimen on board:  Scheduled senna and MiraLax daily  Plan to discontinue IV Dilaudid when Chest tube removed  Tracheostomy in place [facility])  Assessment & Plan  Trach placed in the context of cardiac arrest/prolonged ventilator dependent state November 2021  Decannulated at [facility] 12/11/21  Patient requires frequent tracheostomy changes and suctioning  Per discussion with speech therapy,  video barium swallow was done for sense of food getting stuck  Appropriate for regular diet  On trach collar O2 with humidification and tolerating well btw 6-8L    Type 2 diabetes mellitus with hyperglycemia [facility])  Assessment & Plan  Lab Results   Component Value Date    HGBA1C 12 7 (H) 05/22/2022       Recent Labs     08/02/22  1538 08/02/22  2059 08/03/22  0654 08/03/22  0816   POCGLU 207* 105 98 134     Uncontrolled per most recent A1c  Home regimen: Lantus 16U QHS and Novolog 4U TID  Current inpatient regimen while at [facility]:  Lantus 45U qHS + Humalog 22U TID + SSI 4  Will continue for now and adjust as needed to maintain  - 180    Acute on chronic heart failure with preserved ejection fraction (HCC)  Assessment & Plan  Wt Readings from Last 3 Encounters:   08/02/22 77 7 kg (171 lb 6 4 oz)   07/19/22 78 2 kg (172 lb 6 4 oz)   06/29/22 81 7 kg (180 lb 3 2 oz)     Initially admitted to [facility] on 07/15 with CHF exacerbation, now optimized  Most recent EF 50% with normal systolic and diastolic function on 0/40/8737  Currently stable and euvolemic  On Bumex 2 mg BID with Aldactone 100 mg daily - restarted on 7/29  Continue low-salt diet, strict I's and O's monitoring  Per Cardiology at [facility] 1153 upon discharge    Will need follow-up with General Cardiology & EP service upon discharge for ICD consideration    Chest wall wound infection  Assessment & Plan  Purulent discharge noted at site of recent chest tube placement  Cultures positive for MRSA on 07/28 susceptible to vanc  Currently on IV vancomycin - will continue  Continue local wound care/dressing changes    Acute Respiratory insufficiency on chronic hypoxic respiratory failure  Assessment & Plan  She is status post trach  Currently on 6-8L with sats in the high 90s, at home uses 10 L  Status post recent right pneumothorax requiring chest tube and now with right empyema  Continue aggressive respiratory protocol, p r n  suctioning  Continue Xoponex and Atrovent per Pulm q6hrs  Encourage out of bed, incentive spirometry  Pulmonology on board at [facility] and reconsulted here    CKD (chronic kidney disease) stage 3, GFR 30-59 ml/min ([facility])  Assessment & Plan  MODESTO noted on initial admission to [facility] with elevated Cr 1 64, now resolved  Baseline Cr being 0 9-1 0  Continue to monitor closely  Losartan on hold  Avoid nephrotoxic agents    Anemia  Assessment & Plan    Results from last 7 days   Lab Units 08/03/22  0559 08/02/22  1283 08/01/22 2026 08/01/22  0457 07/31/22  0443 07/30/22  0459 07/29/22  0447   HEMOGLOBIN g/dL 7 9* 8 2* 7 7* 7 8* 8 4* 7 6* 8 2*   HEMATOCRIT % 27 4* 27 4* 25 7* 27 8* 28 1* 25 0* 27 5*     Most likely anemia of chronic disease due to prolonged illness  No active bleeding noted  Iron panel on 07/15 shows normal ferritin but reduced iron stores  Hb stable, monitor closely  Consider transfusing if < 8 due to history of CAD  Continue Iron replacement as well as daily miralax    CAD (coronary artery disease)  Assessment & Plan  STEMI 10/22/2021 s/p PATRICA mid circumflex OM1  OM2 was ballooned but not stented    Pulseless VT 10/27/21 - repeat LHC 90% mid circumflex stenosis, but could not be intervened on  VT 10/28/21 LHC:  found to have apical LAD complete occlusion was felt to be small to be intervened    Cardiac carrest 1/1/22 - [facility] felt to be hypoxic vs  VT induced  On metoprolol 50mg BID, Brilinta 90 mg BID and Lipitor 40 mg qd    A-fib [facility])  Assessment & Plan  Follows closely with Cardiology  Rhythm controlled with amiodarone 100 mg daily and AC with Eliquis 5 mg BID    Hypertension  Assessment & Plan  BP stable  On home losartan 50 mg BID, follows closely with Cardiology  Losartan on hold but consider restarting as MODESTO has now resolved  Monitor BP closely    Hyperlipidemia associated with type 2 diabetes mellitus (HCC)  Assessment & Plan  - Continue Atorvastatin 40 mg daily  Diabetic peripheral neuropathy (HCC)  Assessment & Plan  Continue PTA Lyrica as documented    Anxiety  Assessment & Plan  On Lexapro 10 mg daily as well as Atarax prn        Diet: Diet German/CHO Controlled; Consistent Carbohydrate Diet Level 2 (5 carb servings/75 grams CHO/meal)  Disposition: Admit to Inpatient for CTS evaluation for possible VATS procedure  Consult: IP CONSULT TO PHARMACY  IP CONSULT TO CARDIOTHORACIC SURGERY  IP CONSULT TO PULMONOLOGY  IP CONSULT TO CARDIOLOGY  IP CONSULT TO ANESTHESIOLOGY  IP CONSULT TO INFECTIOUS DISEASES      History of Presenting Illness:   Sharad Washington is a 65 y o  female with a history of CKD3, CAD s/p arrest and PCI on Brilinta, HFrEF (50%), AFib on eliquis, uncontrolled T2DM, and 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  She presents as a direct transfer from [facility] for CTS evaluation for possible VATS procedure due to right lung empyema  She originally presented to [facility] on 07/19 with chest pain and SOB and found to have spontaneous pneumothorax for which she got a chest tube placed by IR on 07/21, upsized on 07/22  Patient then developed severe pain at the site of chest tube insertion requiring removal on 07/24  At this time imaging showed persistent pneumothorax and empyema for which patient was transferred to [facility] for repeat chest tube insertion    Pain was also severe enough to warrant APS consult who carried out peripheral block to help with pain control  She was given two rounds of TPA dornase on 7/30 and 7/31 which resulted in bleeding  She was also started on vanco and cefepime which was eventually narrowed down to vanc after wound and sputum cultures grew MRSA  ID was following and recommend continued antibiotics through 8/16  On 8/2, repeat CT scan showed continued loculations prompting Pulmonology attending, Dr Tyrone Clancy to discuss with thoracic team of [facility] about evaluation for possible VATS procedure  For this reason, patient was transfered [facility]  On arrival she is on 12L via trach collar and is endorsing right sided pain worsened with deep inspiration  She is otherwise stable and reports no new concerns        ED Management:     ED Triage Vitals   Temperature Pulse Respirations Blood Pressure SpO2   08/02/22 2226 08/02/22 2226 08/02/22 2226 08/02/22 2226 08/02/22 2226   98 3 °F (36 8 °C) 68 20 132/80 99 %      Temp src Heart Rate Source Patient Position - Orthostatic VS BP Location FiO2 (%)   -- -- -- -- 08/02/22 2127       28      Pain Score       08/02/22 2115       No Pain         Medications   albuterol inhalation solution 2 5 mg (has no administration in time range)   amiodarone tablet 100 mg (100 mg Oral Given 8/3/22 0810)   atorvastatin (LIPITOR) tablet 40 mg (has no administration in time range)   ticagrelor (BRILINTA) tablet 90 mg (90 mg Oral Given 8/3/22 0656)   escitalopram (LEXAPRO) tablet 10 mg (10 mg Oral Given 8/3/22 0810)   pantoprazole (PROTONIX) EC tablet 40 mg (40 mg Oral Given 8/3/22 0656)   pregabalin (LYRICA) capsule 75 mg (75 mg Oral Given 8/3/22 0809)   guaiFENesin (MUCINEX) 12 hr tablet 1,200 mg (1,200 mg Oral Given 8/3/22 0808)   ferrous sulfate tablet 325 mg (325 mg Oral Refused 8/3/22 0810)   apixaban (ELIQUIS) tablet 5 mg (5 mg Oral Given 8/3/22 0808)   insulin lispro (HumaLOG) 100 units/mL subcutaneous injection 2-12 Units (2 Units Subcutaneous Not Given 8/3/22 0656)   insulin lispro (HumaLOG) 100 units/mL subcutaneous injection 2-12 Units (2 Units Subcutaneous Not Given 8/2/22 2129)   benzonatate (TESSALON PERLES) capsule 100 mg (has no administration in time range)   HYDROmorphone (DILAUDID) tablet 2 mg (2 mg Oral Given 8/3/22 0809)   HYDROmorphone (DILAUDID) tablet 4 mg (has no administration in time range)   diazepam (VALIUM) tablet 5 mg (has no administration in time range)   trimethobenzamide (TIGAN) IM injection 200 mg (has no administration in time range)   HYDROmorphone (DILAUDID) injection 0 5 mg (has no administration in time range)   pregabalin (LYRICA) capsule 25 mg (has no administration in time range)   lidocaine (LIDODERM) 5 % patch 2 patch (2 patches Topical Not Given 8/3/22 0810)   acetaminophen (TYLENOL) tablet 975 mg (975 mg Oral Refused 8/3/22 0657)   spironolactone (ALDACTONE) tablet 100 mg (100 mg Oral Given 8/3/22 0808)   bumetanide (BUMEX) tablet 2 mg (2 mg Oral Given 8/3/22 0809)   metoprolol succinate (TOPROL-XL) 24 hr tablet 50 mg (50 mg Oral Given 8/3/22 0656)   senna-docusate sodium (SENOKOT S) 8 6-50 mg per tablet 1 tablet (1 tablet Oral Refused 8/3/22 0811)   insulin lispro (HumaLOG) 100 units/mL subcutaneous injection 22 Units (22 Units Subcutaneous Not Given 8/3/22 0844)   insulin glargine (LANTUS) subcutaneous injection 45 Units 0 45 mL (45 Units Subcutaneous Given 8/2/22 2127)   polyethylene glycol (MIRALAX) packet 17 g (17 g Oral Refused 8/3/22 0811)   hydrOXYzine HCL (ATARAX) tablet 25 mg (has no administration in time range)   vancomycin (VANCOCIN) 1,250 mg in sodium chloride 0 9 % 250 mL IVPB (has no administration in time range)   albuterol inhalation solution 2 5 mg (has no administration in time range)   levalbuterol (XOPENEX) inhalation solution 1 25 mg (1 25 mg Nebulization Given 8/3/22 0749)   ipratropium (ATROVENT) 0 02 % inhalation solution 0 5 mg (0 5 mg Nebulization Given 8/3/22 0749)       Review of System:   Review of Systems   Constitutional: Negative for fatigue and fever  HENT: Negative for sore throat  Respiratory: Positive for cough, chest tightness and shortness of breath  Cardiovascular: Negative for chest pain, palpitations and leg swelling  Gastrointestinal: Negative for abdominal pain, diarrhea, nausea and vomiting  Genitourinary: Negative for dysuria  Skin: Negative for rash  Neurological: Negative for headaches  All other systems reviewed and are negative  History:     Past Medical History:   Diagnosis Date    Anxiety     Aortic aneurysm ([address] )     Arthritis     Depression     Diabetes mellitus (HCC)     Fibromyalgia     GERD (gastroesophageal reflux disease)     GERD (gastroesophageal reflux disease)     H/O cardiovascular stress test 09/2018    no ischemia  EF 70%   H/O echocardiogram 01/2019    EF 60%  Mild LVH  trivial effusion   Hyperlipidemia     Hypertension     Migraines     Psychiatric disorder     anxiety    Uncontrolled hypertension 2/25/2015    Last Assessment & Plan:  BP today above goal <140/90, apparently asymptomatic  Prior BP elevations were attributed to not taking medication  Consider increased medication if persistent on f/u  Past Surgical History:   Procedure Laterality Date    BACK SURGERY      Lumbar epidural steroid injection    CARDIAC CATHETERIZATION N/A 10/22/2021    Procedure: Cardiac pci;  Surgeon: Manpreet Quinones MD;  [facility]; Service: Cardiology    CARDIAC CATHETERIZATION  10/22/2021    Procedure: Cardiac catheterization;  Surgeon: Manpreet Quinones MD;  [facility]; Service: Cardiology    CARDIAC CATHETERIZATION Left 10/27/2021    Procedure: Cardiac Left Heart Cath;  Surgeon: Constantino Fallon MD;  [facility];   Service: Cardiology    CARDIAC CATHETERIZATION N/A 10/27/2021    Procedure: Cardiac pci;  Surgeon: Constantino Fallon MD;  [facility]; Service: Cardiology    CARDIAC CATHETERIZATION N/A 10/28/2021    Procedure: Cardiac pci;  Surgeon: Sebastian Jalloh DO;  [facility]; Service: Cardiology    CARPAL TUNNEL RELEASE Left      SECTION      CHOLECYSTECTOMY      COLONOSCOPY      incomplete    COLONOSCOPY      EYE SURGERY      HYSTERECTOMY      Total    IR CHEST TUBE PLACEMENT  2022    IR CHEST TUBE PLACEMENT  2022    IR CHEST TUBE PLACEMENT  2022    OVARIAN CYST REMOVAL      PEG W/TRACHEOSTOMY PLACEMENT N/A 2021    Procedure: TRACHEOSTOMY WITH INSERTION PEG TUBE;  Surgeon: Yo Fung DO;  Location: [facility] MAIN OR;  Service: General    TUBAL LIGATION      UPPER GASTROINTESTINAL ENDOSCOPY       Social History     Socioeconomic History    Marital status: Legally      Spouse name: Not on file    Number of children: Not on file    Years of education: Not on file    Highest education level: Not on file   Occupational History    Not on file   Tobacco Use    Smoking status: Former Smoker     Packs/day: 1 00     Years: 30 00     Pack years: 30 00     Types: Cigarettes    Smokeless tobacco: Never Used   Vaping Use    Vaping Use: Never used   Substance and Sexual Activity    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       Drug use: Yes     Types: Marijuana     Comment: medical; seldom use    Sexual activity: Yes     Birth control/protection: Female Sterilization     Comment: Monogamous relationship with monogamous partner   Other Topics Concern    Not on file   Social History Narrative    Most recent tobacco use screenin2019    Do you currently or have you served in the [employer]: No    Were you activated, into active duty, as a member of the [employer] or as a Reservist: No    Advance directive: No    Chewing tobacco: none    Alcohol intake: None    Marital status: Single    Live alone or with others: with others    Family history of heart disease:    aortic aneurysm    High cholesterol: Yes    High blood pressure: Yes    Exercise level: Heavy    Overweight: Yes    Obese: Yes    Diabetes: Yes    General stress level: High    Diet: Regular    Caffeine intake: Occasional    Guns present in home: No    Seat belts used routinely: Yes    Sexual orientation: Heterosexual    Sunscreen used routinely: No    Seat belt/car seat used routinely: Yes    Exercise-How often do you exercise: as tolerated    Do you have regular medical check ups: Yes    Do you have regular dental check ups: Yes    Illicit drugs-years of use:    recovery 23 years - HX of     Social Determinants of Health     Financial Resource Strain: Not on file   Food Insecurity: Food Insecurity Present    Worried About Running Out of Food in the Last Year: Sometimes true    Lela of Food in the Last Year: Sometimes true   Transportation Needs: No Transportation Needs    Lack of Transportation (Medical): No    Lack of Transportation (Non-Medical):  No   Physical Activity: Not on file   Stress: Not on file   Social Connections: Not on file   Intimate Partner Violence: Not on file   Housing Stability: Low Risk     Unable to Pay for Housing in the Last Year: No    Number of Places Lived in the Last Year: 1    Unstable Housing in the Last Year: No     Family History   Problem Relation Age of Onset    Hypertension Mother    Jm Lorenzo Arthritis Mother     Diabetes Father     Other Sister         renal cell carcinoma    Diabetes Other     Factor V Leiden deficiency Other        Lines/Drains/Airways     Active Status     Name Placement date Placement time Site Days    Nerve Block Catheter 07/29/22 07/29/22  1103  -- 4    Surgical Airway Shiley Fenestrated 03/02/22  --  --  154    Chest Tube 1 Right Posterior 12 Fr  07/29/22  1521  Posterior  4                Medications & Allergies:   all medications and allergies reviewed  Metformin  Clonidine    PTA Medications:  Current Facility-Administered Medications on File Prior to Encounter   Medication Dose Route Frequency Provider Last Rate Last Admin    [DISCONTINUED] acetaminophen (TYLENOL) tablet 975 mg  975 mg Oral Q8H [address] Leda Chand PA-C   975 mg at 07/28/22 2122    [DISCONTINUED] albuterol inhalation solution 2 5 mg  2 5 mg Nebulization Q4H PRN Obie Prader, PA-C        [DISCONTINUED] amiodarone tablet 100 mg  100 mg Oral Daily With Breakfast Leda Chand PA-C   100 mg at 08/02/22 7899    [DISCONTINUED] apixaban (ELIQUIS) tablet 5 mg  5 mg Oral BID Leda Chand PA-C   5 mg at 08/02/22 1812    [DISCONTINUED] atorvastatin (LIPITOR) tablet 40 mg  40 mg Oral Daily With [employer], PA-C   40 mg at 08/02/22 1811    [DISCONTINUED] benzonatate (TESSALON PERLES) capsule 100 mg  100 mg Oral TID PRN Edel Bearden MD   100 mg at 07/27/22 0074    [DISCONTINUED] bumetanide (BUMEX) tablet 2 mg  2 mg Oral BID Rosajordan NEELAM Del Cid   2 mg at 08/02/22 1812    [DISCONTINUED] diazepam (VALIUM) tablet 5 mg  5 mg Oral Q6H PRN Etta Nelson PA-C   5 mg at 07/31/22 0512    [DISCONTINUED] escitalopram (LEXAPRO) tablet 10 mg  10 mg Oral Daily Leda Chand PA-C   10 mg at 08/02/22 0926    [DISCONTINUED] ferrous sulfate tablet 325 mg  325 mg Oral Daily With Breakfast Leda Chand PA-C   325 mg at 08/01/22 0810    [DISCONTINUED] guaiFENesin (MUCINEX) 12 hr tablet 1,200 mg  1,200 mg Oral Q12H [address] Leda Chand PA-C   1,200 mg at 08/02/22 1233    [DISCONTINUED] HYDROmorphone (DILAUDID) injection 0 5 mg  0 5 mg Intravenous Q2H PRN Jagdish Pittman MD   0 5 mg at 07/30/22 2033    [DISCONTINUED] HYDROmorphone (DILAUDID) tablet 2 mg  2 mg Oral Q4H PRN Etta Nelson PA-C   2 mg at 08/01/22 0001    [DISCONTINUED] HYDROmorphone (DILAUDID) tablet 4 mg  4 mg Oral Q4H PRN Etta Nelson PA-C   4 mg at 08/01/22 1524    [DISCONTINUED] hydrOXYzine HCL (ATARAX) tablet 25 mg  25 mg Oral Q6H PRN Sp Martinez DO        [DISCONTINUED] insulin glargine (LANTUS) subcutaneous injection 45 Units 0 45 mL  45 Units Subcutaneous HS Marilu Valle MD   45 Units at 08/01/22 2127    [DISCONTINUED] insulin lispro (HumaLOG) 100 units/mL subcutaneous injection 2-12 Units  2-12 Units Subcutaneous TID AC Leda Chand PA-C   4 Units at 08/02/22 1815    [DISCONTINUED] insulin lispro (HumaLOG) 100 units/mL subcutaneous injection 2-12 Units  2-12 Units Subcutaneous HS Leda Chand PA-C   2 Units at 08/01/22 2126    [DISCONTINUED] insulin lispro (HumaLOG) 100 units/mL subcutaneous injection 22 Units  22 Units Subcutaneous TID With Meals Marilu Valle MD   22 Units at 08/02/22 1815    [DISCONTINUED] ipratropium (ATROVENT) 0 02 % inhalation solution 0 5 mg  0 5 mg Nebulization TID Leda Chand PA-C   0 5 mg at 08/02/22 1353    [DISCONTINUED] levalbuterol (XOPENEX) inhalation solution 1 25 mg  1 25 mg Nebulization TID Leda Chand PA-C   1 25 mg at 08/02/22 1353    [DISCONTINUED] lidocaine (LIDODERM) 5 % patch 2 patch  2 patch Topical Daily Leda Chand PA-C   2 patch at 08/01/22 0811    [DISCONTINUED] metoprolol succinate (TOPROL-XL) 24 hr tablet 50 mg  50 mg Oral Q12H Diamond Every Spironello, CRNP   50 mg at 08/02/22 1813    [DISCONTINUED] pantoprazole (PROTONIX) EC tablet 40 mg  40 mg Oral Early Morning Leda Chand PA-C   40 mg at 08/02/22 0518    [DISCONTINUED] polyethylene glycol (MIRALAX) packet 17 g  17 g Oral Daily Heather Rubio DO   17 g at 08/02/22 3857    [DISCONTINUED] pregabalin (LYRICA) capsule 25 mg  25 mg Oral QPM Leda Chand PA-C   25 mg at 08/02/22 1812    [DISCONTINUED] pregabalin (LYRICA) capsule 75 mg  75 mg Oral Daily Leda Chand PA-C   75 mg at 08/02/22 1887    [DISCONTINUED] senna-docusate sodium (SENOKOT S) 8 6-50 mg per tablet 1 tablet  1 tablet Oral BID Marilu Valle MD   1 tablet at 08/02/22 1811    [DISCONTINUED] spironolactone (ALDACTONE) tablet 100 mg  100 mg Oral Daily Leda Chand PA-C 100 mg at 08/02/22 9578    [DISCONTINUED] ticagrelor (BRILINTA) tablet 90 mg  90 mg Oral Q12H Leda Chand PA-C   90 mg at 08/02/22 1811    [DISCONTINUED] trimethobenzamide (TIGAN) IM injection 200 mg  200 mg Intramuscular Q6H PRN Elif Fry PA-C   200 mg at 07/30/22 2011    [DISCONTINUED] vancomycin (VANCOCIN) 1,250 mg in sodium chloride 0 9 % 250 mL IVPB  1,250 mg Intravenous Q24H Juhi Rivera  7 mL/hr at 08/02/22 1351 1,250 mg at 08/02/22 1351     Current Outpatient Medications on File Prior to Encounter   Medication Sig Dispense Refill    escitalopram (LEXAPRO) 10 mg tablet TAKE 1 TABLET BY MOUTH EVERY DAY 90 tablet 1    acetaminophen (TYLENOL) 160 mg/5 mL suspension 30 4 mL (975 mg total) by Per G Tube route every 6 (six) hours as needed for mild pain or fever  0    albuterol (2 5 mg/3 mL) 0 083 % nebulizer solution Take 3 mL (2 5 mg total) by nebulization every 4 (four) hours as needed for wheezing or shortness of breath 180 mL 5    amiodarone 100 mg tablet TAKE 1 TABLET BY MOUTH DAILY WITH BREAKFAST   90 tablet 0    apixaban (ELIQUIS) 5 mg Take 1 tablet (5 mg total) by mouth 2 (two) times a day 180 tablet 0    atorvastatin (LIPITOR) 40 mg tablet Take 1 tablet (40 mg total) by mouth daily 90 tablet 0    Benzocaine-Menthol 15-2 6 MG LOZG Apply 1 lozenge to the mouth or throat 4 (four) times a day as needed (sore throat) (Patient not taking: No sig reported) 15 lozenge 0    Blood Pressure Monitoring (Sphygmomanometer) MISC Use 2 (two) times a day (Patient not taking: No sig reported) 1 each 0    Brilinta 90 MG TAKE 1 TABLET BY MOUTH EVERY 12 HOURS  180 tablet 0    bumetanide (BUMEX) 2 mg tablet Take 1 tablet (2 mg total) by mouth 2 (two) times a day 180 tablet 1    chlorhexidine (PERIDEX) 0 12 % solution Apply 15 mL to the mouth or throat every 12 (twelve) hours 120 mL 0    famotidine (PEPCID) 20 mg/2 5 mL oral suspension Take 2 5 mL (20 mg total) by mouth 2 (two) times a day  0    insulin aspart (NovoLOG FlexPen) 100 UNIT/ML injection pen Inject 4 Units under the skin 3 (three) times a day with meals 15 mL 0    insulin glargine (Basaglar KwikPen) 100 units/mL injection pen Inject 16 Units under the skin daily 15 mL 0    Insulin Pen Needle 31G X 6 MM MISC 1 Device by Does not apply route 4 (four) times a day      ipratropium (ATROVENT) 0 02 % nebulizer solution Take 2 5 mL (0 5 mg total) by nebulization 3 (three) times a day  0    Lancets MISC Use 1 Device 4 (four) times a day      levalbuterol (XOPENEX) 1 25 mg/0 5 mL nebulizer solution Take 0 5 mL (1 25 mg total) by nebulization 3 (three) times a day  0    LORazepam (Ativan) 1 mg tablet Take 0 5 tablets (0 5 mg total) by mouth every 8 (eight) hours as needed for anxiety or sleep 30 tablet 0    losartan (COZAAR) 50 mg tablet Take 1 tablet (50 mg total) by mouth every 12 (twelve) hours 90 tablet 0    magnesium Oxide (MAG-OX) 400 mg TABS TAKE 1 TABLET (400 MG TOTAL) BY MOUTH 2 TIMES A DAY      melatonin 3 mg Take 2 tablets (6 mg total) by mouth daily at bedtime (Patient not taking: No sig reported) 180 tablet 0    metoprolol succinate (TOPROL-XL) 50 mg 24 hr tablet Take 1 tablet (50 mg total) by mouth every 12 (twelve) hours 180 tablet 3    Novofine Pen Needle 32G X 6 MM MISC USE 3 (THREE) TIMES A DAY WITH MEALS 100 each 2    pantoprazole (PROTONIX) 40 mg tablet Take 1 tablet (40 mg total) by mouth daily 90 tablet 3    polyethylene glycol (MIRALAX) 17 g packet Take 17 g by mouth daily (Patient not taking: No sig reported)  0    potassium chloride 40 MEQ/15ML (20%) oral solution Take 15 mL (40 mEq total) by mouth 2 (two) times a day 360 mL 3    pregabalin (LYRICA) 75 mg capsule TAKE ONE CAPSULE EVERY DAY 90 capsule 1    senna-docusate sodium (SENOKOT S) 8 6-50 mg per tablet Take 1 tablet by mouth daily at bedtime (Patient not taking: No sig reported)  0    spironolactone (ALDACTONE) 100 mg tablet TAKE 1 TABLET BY MOUTH EVERY DAY 90 tablet 0    traZODone (DESYREL) 50 mg tablet TAKE 0 5 TABLET (25MG) BY MOUTH NIGHTLY AS NEEDED FOR SLEEP  (Patient not taking: No sig reported)         Objective & Vitals:     Vitals: /74   Pulse 70   Temp (!) 97 3 °F (36 3 °C)   Resp 16   SpO2 98%       Intake/Output Summary (Last 24 hours) at 8/3/2022 0846  Last data filed at 8/3/2022 0809  Gross per 24 hour   Intake 480 ml   Output 625 ml   Net -145 ml       Invasive Devices  Report    Peripheral Intravenous Line  Duration           Peripheral IV 08/01/22 Left Antecubital 1 day          Epidural Line  Duration           Nerve Block Catheter 07/29/22 4 days          Drain  Duration           Chest Tube 1 Right Posterior 12 Fr  4 days          Airway  Duration           Surgical Airway Shiley Fenestrated 154 days                Labs: I have personally reviewed pertinent reports      Recent Results (from the past 24 hour(s))   Fingerstick Glucose (POCT)    Collection Time: 08/02/22 11:03 AM   Result Value Ref Range    POC Glucose 158 (H) 65 - 140 mg/dl   Fingerstick Glucose (POCT)    Collection Time: 08/02/22  3:38 PM   Result Value Ref Range    POC Glucose 207 (H) 65 - 140 mg/dl   Fingerstick Glucose (POCT)    Collection Time: 08/02/22  8:59 PM   Result Value Ref Range    POC Glucose 105 65 - 140 mg/dl   Comprehensive metabolic panel    Collection Time: 08/03/22  5:59 AM   Result Value Ref Range    Sodium 132 (L) 135 - 147 mmol/L    Potassium 3 7 3 5 - 5 3 mmol/L    Chloride 101 96 - 108 mmol/L    CO2 26 21 - 32 mmol/L    ANION GAP 5 4 - 13 mmol/L    BUN 39 (H) 5 - 25 mg/dL    Creatinine 1 34 (H) 0 60 - 1 30 mg/dL    Glucose 84 65 - 140 mg/dL    Calcium 8 9 8 3 - 10 1 mg/dL    Corrected Calcium 10 7 (H) 8 3 - 10 1 mg/dL    AST 33 5 - 45 U/L    ALT 18 12 - 78 U/L    Alkaline Phosphatase 81 46 - 116 U/L    Total Protein 8 3 6 4 - 8 4 g/dL    Albumin 1 8 (L) 3 5 - 5 0 g/dL    Total Bilirubin 0 27 0 20 - 1 00 mg/dL    eGFR 44 ml/min/1 73sq m   CBC and differential    Collection Time: 08/03/22  5:59 AM   Result Value Ref Range    WBC 9 84 4 31 - 10 16 Thousand/uL    RBC 3 52 (L) 3 81 - 5 12 Million/uL    Hemoglobin 7 9 (L) 11 5 - 15 4 g/dL    Hematocrit 27 4 (L) 34 8 - 46 1 %    MCV 78 (L) 82 - 98 fL    MCH 22 4 (L) 26 8 - 34 3 pg    MCHC 28 8 (L) 31 4 - 37 4 g/dL    RDW 18 1 (H) 11 6 - 15 1 %    MPV 9 9 8 9 - 12 7 fL    Platelets 933 (H) 017 - 390 Thousands/uL   Procalcitonin    Collection Time: 08/03/22  5:59 AM   Result Value Ref Range    Procalcitonin 0 46 (H) <=0 25 ng/ml   Fingerstick Glucose (POCT)    Collection Time: 08/03/22  6:54 AM   Result Value Ref Range    POC Glucose 98 65 - 140 mg/dl   Fingerstick Glucose (POCT)    Collection Time: 08/03/22  8:16 AM   Result Value Ref Range    POC Glucose 134 65 - 140 mg/dl       Imaging:     I have personally reviewed pertinent reports  CT chest wo contrast    Result Date: 8/2/2022  Impression: 1  Decreased size of a right-sided pleural effusion and improved aeration of the right lower lobe post placement of a right pleural drainage catheter, with unchanged appearance of a loculated component of the effusion superomedially  There is slightly increased density of the effusion compatible with a small amount of blood products  2   Appearance of new inflammatory groundglass opacities in the left lower lobe  3   Mild background interstitial edema is unchanged  The study was marked in EPIC for significant notification  Workstation performed: [id number]       EKG, Pathology, and Other Studies:   I have personally reviewed pertinent reports  Physical Exam:   Physical Exam  Constitutional:       General: She is not in acute distress  Appearance: She is ill-appearing  Comments: Trach in place on 6L, NAD   HENT:      Head: Normocephalic and atraumatic        Right Ear: External ear normal       Left Ear: External ear normal       Nose: Nose normal    Cardiovascular:      Rate and Rhythm: Normal rate and regular rhythm  Heart sounds: Normal heart sounds  Pulmonary:      Effort: Pulmonary effort is normal  No respiratory distress  Breath sounds: Normal breath sounds  No wheezing  Chest:      Chest wall: Tenderness present  Comments: Right-sided Chest tube in place draining serosanguineous fluid  Abdominal:      Palpations: Abdomen is soft  Tenderness: There is no abdominal tenderness  Musculoskeletal:         General: No swelling, tenderness or deformity  Normal range of motion  Right lower leg: No edema  Left lower leg: No edema  Skin:     General: Skin is warm  Findings: No rash  Neurological:      Mental Status: She is alert  Discussed with the B FM Attending on-call, Dr Antione Mott, DO Ella Koo MD  PGY-3 Family Medicine  08/03/22

## 2022-08-03 NOTE — ASSESSMENT & PLAN NOTE
· STEMI 10/22/2021 s/p PATRICA mid circumflex OM1  OM2 was ballooned but not stented    · Pulseless VT 10/27/21 - repeat LHC 90% mid circumflex stenosis, but could not be intervened on  · VT 10/28/21 LHC:  found to have apical LAD complete occlusion was felt to be small to be intervened    · Cardiac carrest 1/1/22 - [facility] felt to be hypoxic vs  VT induced  · On metoprolol 50mg BID, Brilinta 90 mg BID and Lipitor 40 mg qd

## 2022-08-03 NOTE — ASSESSMENT & PLAN NOTE
· She is status post trach   Currently on 6-8L with sats in the high 90s, at home uses 10 L  · Status post recent right pneumothorax requiring chest tube and now with right empyema  · Continue aggressive respiratory protocol, p r n  suctioning  · Continue Xoponex and Atrovent per Pulm q6hrs  · Encourage out of bed, incentive spirometry  · Pulmonology following

## 2022-08-03 NOTE — ASSESSMENT & PLAN NOTE
Wt Readings from Last 3 Encounters:   08/04/22 77 6 kg (171 lb 1 2 oz)   08/02/22 77 7 kg (171 lb 6 4 oz)   07/19/22 78 2 kg (172 lb 6 4 oz)     · Initially admitted to [facility] on 07/15 with CHF exacerbation, now optimized  · Most recent EF 50% with normal systolic and diastolic function on 4/47/9534  Currently stable and euvolemic  · On Bumex 2 mg BID with Aldactone 100 mg daily - restarted on 7/29  · Continue low-salt diet, strict I's and O's monitoring  · Per Cardiology at [facility] 1153 upon discharge    · Will need follow-up with General Cardiology & EP service upon discharge for ICD consideration

## 2022-08-03 NOTE — RESPIRATORY THERAPY NOTE
08/02/22 2146   Respiratory Protocol   Protocol Initiated? Yes   Protocol Selection Respiratory   Language Barrier? No   Medical & Social History Reviewed? Yes   Diagnostic Studies Reviewed? Yes   Physical Assessment Performed? Yes   Home Devices/Therapy Home O2   Respiratory Plan Home Bronchodilator Patient pathway   Respiratory Assessment   Assessment Type During-treatment   General Appearance Alert; Awake   Respiratory Pattern Normal   Chest Assessment Chest expansion symmetrical   Bilateral Breath Sounds Rhonchi   Resp Comments Assessment of Respiratory protocol  Pt was transf from [facility]  Here for a Thorasic consult Has a Trach 28% 94%  Cathlean Scotty Hx of Empyema lung, Right  Takes tx and on O2 at home  Will continue  same   TID respiratory tx and prn, sucction as needed   O2 Device trach

## 2022-08-03 NOTE — PROGRESS NOTES
Vancomycin IV Pharmacy-to-Dose Consultation    Matias Cortez is a 65 y o  female who is currently receiving Vancomycin IV with management by the Pharmacy Consult service  Assessment/Plan:  The patient was reviewed  Renal function is stable with slightly worsening SCr and but no infusion reactions were documented in the chart  Based on todays assessment, continue current vancomycin (day # 2) dosing of 1250mg iv q24h, with a plan for trough to be drawn at 1330 on 8/4/22  We will continue to follow the patients culture results and clinical progress daily      Shanika Browning, Pharmacist

## 2022-08-03 NOTE — OCCUPATIONAL THERAPY NOTE
Occupational Therapy Cancellation Note        Patient Name: Godfrey Gupta  XJRFH'R Date: 8/3/2022       08/03/22 0748   OT Last Visit   OT Visit Date 08/03/22   Note Type   Note type Evaluation; Cancelled Session   Cancel Reasons Medical status       OT orders received, chart reviewed  Noted that pt is pending thoracic surgery intervention  OT will hold at this time and continue to follow and see as medically appropriate and able       VJ Heard, OTR/L

## 2022-08-03 NOTE — ASSESSMENT & PLAN NOTE
· Loculated right pleural effusion  S/P right-sided chest tube insertion and removal on 07/24  · CT of the chest on 7/28/22 revealed moderate partially loculated right pleural effusion  · Right chest tube re-placed on 7/29/22 by IR  · Pleural fluid analysis shows neutrophil predominant WBC count  · Fluid cultures from prior anterior chest tube site positive for MRSA  · 8/1 - CXR: Small component of right pleural effusion suspected which may be partially loculated  Indwelling right thoracostomy tube without pneumothorax  · 8/2 CT Chest:  Decreased size of a right-sided pleural effusion and improved aeration of the right lower lobe post placement of a right pleural drainage catheter, with unchanged appearance of a loculated component of the effusion superomedially  There is slightly  increased density of the effusion compatible with a small amount of blood products  2   Appearance of new inflammatory groundglass opacities in the left lower lobe  3   Mild background interstitial edema is unchanged  Plan:  - Continue Abx w/ Vanc given MRSA, Cefepime d/c'd - Vanc will need to be continued through 8/16 per ID  - Continue Chest tube management per Pulmonology, reconsulted  - Cardiothoracic team consulted for VATS procedure, follow up recs  - ID and pharmacy also reconsulted    - Monitor fever curve and white count closely

## 2022-08-03 NOTE — ASSESSMENT & PLAN NOTE
Lab Results   Component Value Date    HGBA1C 12 7 (H) 05/22/2022       Recent Labs     08/03/22  1541 08/03/22  1836 08/03/22 2045 08/04/22  0607   POCGLU 255* 260* 180* 148*     Uncontrolled per most recent A1c  Home regimen: Lantus 16U QHS and Novolog 4U TID  Current inpatient regimen while at [facility]:  Lantus 45U qHS + Humalog 20U TID + SSI 4  Will continue for now and adjust as needed to maintain  - 180

## 2022-08-03 NOTE — ASSESSMENT & PLAN NOTE
· MODESTO noted on initial admission to [facility] with elevated Cr 1 64, now resolved  · Baseline Cr being 0 9-1 0  · Continue to monitor closely   Losartan on hold  · Avoid nephrotoxic agents

## 2022-08-03 NOTE — WOUND OSTOMY CARE
Progress Note - Wound   Niesha Red 65 y o  female MRN: [id number]  Unit/Bed#: [facility] 429-01 Encounter: [id number]      Assessment:  Wound care consulted for assessment of sacral wound  Patient admitted with R empyema of the lung  Transferred from [address] [place] [address]  History of - tracheostomy, DM, CHF, CKD, anemia, CAD, A-fib, HTN, and anxiety  Patient seen in bed, alert and oriented x 4, cooperative and agreeable for the assessment  Minimal assist of one with turning for the assessment  Incontinent of urine and stool at time of the assessment, nehemias-care rendered  Foam wedges are in use for repositioning  B/l heels are dry and intact without redness or wounds  1  Mid sacral stage 2 pressure injury - POA to SLB  Wound is round/oval shaped, partial thickness wound, 100% moist pink/red tissue  Edges fragile and attached without maceration  Nehemias-wound and remainder of the skin to the b/l sacrum/buttocks are intact with blanchable pink/hyperpigmented skin  -will recommend calazime cream to the wound as foam dressing was soiled at time of the assessment      Educated patient on the importance of frequent offloading of pressure via turning, repositioning and weight-shifting  Verbalized understanding of plan of care  No induration, fluctuance, odor, warmth/temperature differences, redness, or purulence noted to the above noted wounds and skin areas assessed  Patient tolerated assessment well- denies pain and no s/s of non-verbal pain or discomfort observed during the encounter  Primary nurse aware of plan of care  See flow sheets for more detailed assessment findings  Will follow along  Plan: 1  Cleanse sacral with soap and water, pat dry, and apply calazime cream TID and PRN   2  Apply skin nourishing cream the entire skin daily for moisture  3  Turn and reposition patient every  2 hours   4  Elevate heels off of bed with pillows to offload pressure   5   Apply EHOB waffle cushion to chair when OOB, if able  6  Apply Allevyn foam to heels, rené w/P, peel foam check skin integrity q-shift  Change q3d and PRN for soilage/dislodgement  7  Apply hydraguard to the b/l buttocks BID and PRN     Objective:    Vitals: Blood pressure 123/75, pulse 70, temperature 98 3 °F (36 8 °C), resp  rate 16, SpO2 98 %  ,There is no height or weight on file to calculate BMI  Wound 08/02/22 Pressure Injury Sacrum (Active)   Wound Image   08/03/22 0846   Wound Description Beefy red;Pink 08/03/22 0846   Pressure Injury Stage 2 08/03/22 0846   Anna-wound Assessment Dry; Intact;Fragile; Hyperpigmented;Pink 08/03/22 0846   Wound Length (cm) 1 5 cm 08/03/22 0846   Wound Width (cm) 1 cm 08/03/22 0846   Wound Depth (cm) 0 1 cm 08/03/22 0846   Wound Surface Area (cm^2) 1 5 cm^2 08/03/22 0846   Wound Volume (cm^3) 0 15 cm^3 08/03/22 0846   Calculated Wound Volume (cm^3) 0 15 cm^3 08/03/22 0846   Tunneling 0 cm 08/03/22 0846   Tunneling in depth located at 0 08/03/22 0846   Undermining 0 08/03/22 0846   Undermining is depth extending from 0 08/03/22 0846   Drainage Amount Scant 08/03/22 0846   Drainage Description Serosanguineous 08/03/22 0846   Non-staged Wound Description Partial thickness 08/03/22 0846   Treatments Cleansed;Site care 08/03/22 0846   Dressing Protective barrier 08/03/22 0846   Wound packed? No 08/03/22 0846   Packing- # removed 0 08/03/22 0846   Packing- # inserted 0 08/03/22 0846   Patient Tolerance Tolerated well 08/03/22 0846       Tiger text with questions or concerns  Wound care will follow along weekly  AVS updated    Sonia Plascencia RN BSN CWON

## 2022-08-03 NOTE — ASSESSMENT & PLAN NOTE
· Trach placed in the context of cardiac arrest/prolonged ventilator dependent state November 2021  · Decannulated at [facility] 12/11/21  · Patient requires frequent tracheostomy changes and suctioning  · Per discussion with speech therapy,  video barium swallow was done for sense of food getting stuck   Appropriate for regular diet  · On trach collar O2 with humidification and tolerating well at 6 L

## 2022-08-03 NOTE — ASSESSMENT & PLAN NOTE
· Loculated right pleural effusion  She is status post spontaneous right-sided pneumothorax with chest tube placement and subsequent removal  · Most recent CT of the chest on 7/28/22 revealed moderate partially loculated right pleural effusion  · Right chest tube re-placed on 7/29/22 by IR   · Cx tube output about 150 mL over last 24 hrs - 8/1  · Pleural fluid analysis shows neutrophil predominant WBC count  · Fluid cultures from prior anterior chest tube site positive for MRSA  · 8/1 - CXR: Small component of right pleural effusion suspected which may be partially loculated  Indwelling right thoracostomy tube without pneumothorax  · 8/2 CT Chest:  Decreased size of a right-sided pleural effusion and improved aeration of the right lower lobe post placement of a right pleural drainage catheter, with unchanged appearance of a loculated component of the effusion superomedially  There is slightly  increased density of the effusion compatible with a small amount of blood products  2   Appearance of new inflammatory groundglass opacities in the left lower lobe  3   Mild background interstitial edema is unchanged       Plan:  - Continue Abx w/ Vanc Day 7 per ID, Cefepime d/c'd - Vanc will need to be continued until 8/16 per ID  - Continue Chest tube management per Pulmonology  - Case discussed by [facility] attending with Thoracic surgery for transfer to [facility] for evaluation for VATs procedure

## 2022-08-03 NOTE — ASSESSMENT & PLAN NOTE
· Trach placed in the context of cardiac arrest/prolonged ventilator dependent state November 2021  · Decannulated at [facility] 12/11/21  · Patient requires frequent tracheostomy changes and suctioning  · Per discussion with speech therapy,  video barium swallow was done for sense of food getting stuck   Appropriate for regular diet  · On trach collar O2 with humidification and tolerating well btw 6-8L

## 2022-08-03 NOTE — UTILIZATION REVIEW
Notification of Discharge   This is a Notification of Discharge from our facility [address]  Please be advised that this patient has been discharge from our facility  Below you will find the admission and discharge date and time including the patients disposition  UTILIZATION REVIEW CONTACT:  Olivier Modi MA  Utilization   Network Utilization Review Department  Phone: [phone] x carefully listen to the prompts  All voicemails are confidential   Email: [email or web address]     PHYSICIAN ADVISORY SERVICES:  FOR HFLN-RS-BATR REVIEW - MEDICAL NECESSITY DENIAL  Phone: [phone]  Fax: [phone]  Email: [email or web address]     PRESENTATION DATE: 7/19/2022 11:33 AM  OBERVATION ADMISSION DATE:   INPATIENT ADMISSION DATE: 7/19/22 11:33 AM   DISCHARGE DATE: 8/2/2022  8:02 PM  DISPOSITION: [address] [facility]      IMPORTANT INFORMATION:  Send all requests for admission clinical reviews, approved or denied determinations and any other requests to dedicated fax number below belonging to the campus where the patient is receiving treatment   List of dedicated fax numbers:  [address] DENIALS (Administrative/Medical Necessity) [phone]   [address]  [facility] (Maternity/NICU/Pediatrics) [phone]   Maynor Challenger [phone]   [address] [facility] [address] [phone]   [address] [place] [address] [phone]   [address] [facility] [address] [place] [address],[address] [phone]   [facility]  [phone]   [address] [facility]  [address] [facility] [address] [facility] [phone]

## 2022-08-03 NOTE — CONSULTS
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 presents with recurrent right sided pleural effusions now with an empyema  Stable  Plan:  - Obtain CXR  - Recommend cardiology consult to discuss holding Eliquis and Brilinta preoperatively in the setting of A  Fib and her history of PCI/CAD/arrest  Would also like their recommendations for pre-operative optimization given her significant cardiac history  - Consult anesthesia for pre-operative evaluation   - Recommend continuing IV vancomycin   - CT to -40 suction  - Thoracic surgery will continue to follow along, please reach out with any questions or concerns  HPI:  Damaris Vazquez is a 65 y o  female with a history of CKD3, CAD s/p arrest and PCI on Brilinta , HFrEF (50%), A- Fib on eliquis, T2DM, and 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  She presents as transfer from [place] for a right empyema  She was originally admitted for shortness of breath and found to have a spontaneous pneumothorax for which IR was consulted and placed a chest tube on 7/21 with upsizing on 7/22  The chest tube was eventually removed on 7/24 however she then suffered from MRSA infection at the chest tube site as well as re-accumulation  She was started on IV vancomycin and another chest tube was placed on 7/29 and is draining serosanguinous fluid  She was given two rounds of TPA dornase on 7/30 and 7/31 which resulted in bleeding  On arrival she is on 12L via trach collar and is endorsing right sided pain worsened with deep inspiration  She is hemodynamically stable in no acute distress       Physical Exam  Constitutional:       General: She is not in acute distress  Appearance: She is ill-appearing  HENT:      Head: Normocephalic and atraumatic  Right Ear: External ear normal       Left Ear: External ear normal       Nose: Nose normal       Mouth/Throat:      Pharynx: Oropharynx is clear  Eyes:      Extraocular Movements: Extraocular movements intact  Pupils: Pupils are equal, round, and reactive to light  Cardiovascular:      Rate and Rhythm: Normal rate  Pulses: Normal pulses  Pulmonary:      Effort: Pulmonary effort is normal  No respiratory distress  Comments: Right sided chest tube in place draining serosanginous fluid  Chest:      Chest wall: Tenderness present  Abdominal:      General: There is no distension  Palpations: Abdomen is soft  Tenderness: There is no abdominal tenderness  Musculoskeletal:         General: No deformity  Cervical back: Normal range of motion  Neurological:      General: No focal deficit present  Mental Status: She is alert  Mental status is at baseline  Psychiatric:         Mood and Affect: Mood normal          Behavior: Behavior normal             Review of Systems   Constitutional: Positive for activity change and fatigue  HENT: Negative  Eyes: Negative  Respiratory: Positive for cough, chest tightness and shortness of breath  Cardiovascular: Positive for chest pain  Gastrointestinal: Negative  Endocrine: Negative  Genitourinary: Negative  Musculoskeletal: Negative  Skin: Positive for wound (right anterior chest)  Allergic/Immunologic: Negative  Neurological: Negative  Hematological: Negative  Psychiatric/Behavioral: Negative           Objective         Intake/Output Summary (Last 24 hours) at 8/2/2022 2357  Last data filed at 8/2/2022 2150  Gross per 24 hour   Intake --   Output 300 ml   Net -300 ml       First Vitals:   Blood Pressure: 132/80 (08/02/22 2226)  Pulse: 68 (08/02/22 2226)  Temperature: 98 3 °F (36 8 °C) (08/02/22 2226)  Respirations: 20 (08/02/22 2226)  SpO2: 99 % (08/02/22 2226)    Current Vitals:   Blood Pressure: 132/80 (08/02/22 2226)  Pulse: 68 (08/02/22 2226)  Temperature: 98 3 °F (36 8 °C) (08/02/22 2226)  Respirations: 20 (08/02/22 2226)  SpO2: 99 % (08/02/22 2226)    Invasive Devices  Report    Peripheral Intravenous Line  Duration           Peripheral IV 08/01/22 Left Antecubital 1 day          Epidural Line  Duration           Nerve Block Catheter 07/29/22 4 days          Drain  Duration           Chest Tube 1 Right Posterior 12 Fr  4 days          Airway  Duration           Surgical Airway Shiley Fenestrated 153 days                Imaging: I have personally reviewed pertinent reports  CT chest wo contrast    Result Date: 8/2/2022  Impression: 1  Decreased size of a right-sided pleural effusion and improved aeration of the right lower lobe post placement of a right pleural drainage catheter, with unchanged appearance of a loculated component of the effusion superomedially  There is slightly increased density of the effusion compatible with a small amount of blood products  2   Appearance of new inflammatory groundglass opacities in the left lower lobe  3   Mild background interstitial edema is unchanged  The study was marked in EPIC for significant notification  Workstation performed: [id number]       EKG, Pathology, and Other Studies: I have personally reviewed pertinent reports  VTE Pharmacologic Prophylaxis: Eliquis  VTE Mechanical Prophylaxis: sequential compression device    Historical Information   Past Medical History:   Diagnosis Date    Anxiety     Aortic aneurysm (HCC)     Arthritis     Depression     Diabetes mellitus (HCC)     Fibromyalgia     GERD (gastroesophageal reflux disease)     GERD (gastroesophageal reflux disease)     H/O cardiovascular stress test 09/2018    no ischemia  EF 70%   H/O echocardiogram 01/2019    EF 60%  Mild LVH  trivial effusion      Hyperlipidemia     Hypertension     Migraines     Psychiatric disorder     anxiety    Uncontrolled hypertension 2015    Last Assessment & Plan:  BP today above goal <140/90, apparently asymptomatic  Prior BP elevations were attributed to not taking medication  Consider increased medication if persistent on f/u  Past Surgical History:   Procedure Laterality Date    BACK SURGERY      Lumbar epidural steroid injection    CARDIAC CATHETERIZATION N/A 10/22/2021    Procedure: Cardiac pci;  Surgeon: Bishnu Tony MD;  [facility]; Service: Cardiology    CARDIAC CATHETERIZATION  10/22/2021    Procedure: Cardiac catheterization;  Surgeon: Bishnu Tony MD;  [facility]; Service: Cardiology    CARDIAC CATHETERIZATION Left 10/27/2021    Procedure: Cardiac Left Heart Cath;  Surgeon: Nichole Ghotra MD;  [facility]; Service: Cardiology    CARDIAC CATHETERIZATION N/A 10/27/2021    Procedure: Cardiac pci;  Surgeon: Nichole Ghotra MD;  [facility]; Service: Cardiology    CARDIAC CATHETERIZATION N/A 10/28/2021    Procedure: Cardiac pci;  Surgeon: Paulina Mckeon DO;  [facility]; Service: Cardiology    CARPAL TUNNEL RELEASE Left      SECTION      CHOLECYSTECTOMY      COLONOSCOPY      incomplete    COLONOSCOPY      EYE SURGERY      HYSTERECTOMY      Total    IR CHEST TUBE PLACEMENT  2022    IR CHEST TUBE PLACEMENT  2022    IR CHEST TUBE PLACEMENT  2022    OVARIAN CYST REMOVAL      PEG W/TRACHEOSTOMY PLACEMENT N/A 2021    Procedure: TRACHEOSTOMY WITH INSERTION PEG TUBE;  Surgeon: Dipika Fung DO;  Location: [facility];  Service: General    TUBAL LIGATION      UPPER GASTROINTESTINAL ENDOSCOPY       Social History   Social History     Substance and Sexual Activity   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        Social History     Substance and Sexual Activity   Drug Use Yes    Types: Marijuana    Comment: medical; seldom use     Social History     Tobacco Use   Smoking Status Former Smoker    Packs/day: 1 00    Years: 30 00    Pack years: 30 00    Types: Cigarettes   Smokeless Tobacco Never Used     Family History   Problem Relation Age of Onset    Hypertension Mother     Arthritis Mother     Diabetes Father     Other Sister         renal cell carcinoma    Diabetes Other     Factor V Leiden deficiency Other        Meds/Allergies   all current active meds have been reviewed, current meds:   Current Facility-Administered Medications   Medication Dose Route Frequency    acetaminophen (TYLENOL) tablet 975 mg  975 mg Oral Q8H [address]    albuterol inhalation solution 2 5 mg  2 5 mg Nebulization Q4H PRN    albuterol inhalation solution 2 5 mg  2 5 mg Nebulization Q4H PRN    [START ON 8/3/2022] amiodarone tablet 100 mg  100 mg Oral Daily With Breakfast    [START ON 8/3/2022] apixaban (ELIQUIS) tablet 5 mg  5 mg Oral BID    [START ON 8/3/2022] atorvastatin (LIPITOR) tablet 40 mg  40 mg Oral Daily With Dinner    benzonatate (TESSALON PERLES) capsule 100 mg  100 mg Oral TID PRN    [START ON 8/3/2022] bumetanide (BUMEX) tablet 2 mg  2 mg Oral BID    diazepam (VALIUM) tablet 5 mg  5 mg Oral Q6H PRN    [START ON 8/3/2022] escitalopram (LEXAPRO) tablet 10 mg  10 mg Oral Daily    [START ON 8/3/2022] ferrous sulfate tablet 325 mg  325 mg Oral Daily With Breakfast    guaiFENesin (MUCINEX) 12 hr tablet 1,200 mg  1,200 mg Oral Q12H [address]    HYDROmorphone (DILAUDID) injection 0 5 mg  0 5 mg Intravenous Q2H PRN    HYDROmorphone (DILAUDID) tablet 2 mg  2 mg Oral Q4H PRN    HYDROmorphone (DILAUDID) tablet 4 mg  4 mg Oral Q4H PRN    hydrOXYzine HCL (ATARAX) tablet 25 mg  25 mg Oral Q6H PRN    insulin glargine (LANTUS) subcutaneous injection 45 Units 0 45 mL  45 Units Subcutaneous HS    [START ON 8/3/2022] insulin lispro (HumaLOG) 100 units/mL subcutaneous injection 2-12 Units  2-12 Units Subcutaneous TID AC    insulin lispro (HumaLOG) 100 units/mL subcutaneous injection 2-12 Units  2-12 Units Subcutaneous HS    [START ON 8/3/2022] insulin lispro (HumaLOG) 100 units/mL subcutaneous injection 22 Units  22 Units Subcutaneous TID With Meals    ipratropium (ATROVENT) 0 02 % inhalation solution 0 5 mg  0 5 mg Nebulization TID    levalbuterol (XOPENEX) inhalation solution 1 25 mg  1 25 mg Nebulization TID    [START ON 8/3/2022] lidocaine (LIDODERM) 5 % patch 2 patch  2 patch Topical Daily    [START ON 8/3/2022] metoprolol succinate (TOPROL-XL) 24 hr tablet 50 mg  50 mg Oral Q12H    [START ON 8/3/2022] pantoprazole (PROTONIX) EC tablet 40 mg  40 mg Oral Early Morning    [START ON 8/3/2022] polyethylene glycol (MIRALAX) packet 17 g  17 g Oral Daily    [START ON 8/3/2022] pregabalin (LYRICA) capsule 25 mg  25 mg Oral QPM    [START ON 8/3/2022] pregabalin (LYRICA) capsule 75 mg  75 mg Oral Daily    [START ON 8/3/2022] senna-docusate sodium (SENOKOT S) 8 6-50 mg per tablet 1 tablet  1 tablet Oral BID    [START ON 8/3/2022] spironolactone (ALDACTONE) tablet 100 mg  100 mg Oral Daily    [START ON 8/3/2022] ticagrelor (BRILINTA) tablet 90 mg  90 mg Oral Q12H    trimethobenzamide (TIGAN) IM injection 200 mg  200 mg Intramuscular Q6H PRN    [START ON 8/3/2022] vancomycin (VANCOCIN) 1,250 mg in sodium chloride 0 9 % 250 mL IVPB  1,250 mg Intravenous Q24H    and PTA meds:   Prior to Admission Medications   Prescriptions Last Dose Informant Patient Reported? Taking? Benzocaine-Menthol 15-2 6 MG LOZG  Self No No   Sig: Apply 1 lozenge to the mouth or throat 4 (four) times a day as needed (sore throat)   Patient not taking: No sig reported   Blood Pressure Monitoring (Sphygmomanometer) MISC   No No   Sig: Use 2 (two) times a day   Patient not taking: No sig reported   Brilinta 90 MG   No No   Sig: TAKE 1 TABLET BY MOUTH EVERY 12 HOURS     Insulin Pen Needle 31G X 6 MM MISC Self Yes No   Si Device by Does not apply route 4 (four) times a day   LORazepam (Ativan) 1 mg tablet   No No   Sig: Take 0 5 tablets (0 5 mg total) by mouth every 8 (eight) hours as needed for anxiety or sleep   Lancets MISC  Self Yes No   Sig: Use 1 Device 4 (four) times a day   Novofine Pen Needle 32G X 6 MM MISC   No No   Sig: USE 3 (THREE) TIMES A DAY WITH MEALS   acetaminophen (TYLENOL) 160 mg/5 mL suspension  Self No No   Si 4 mL (975 mg total) by Per G Tube route every 6 (six) hours as needed for mild pain or fever   albuterol (2 5 mg/3 mL) 0 083 % nebulizer solution  Self No No   Sig: Take 3 mL (2 5 mg total) by nebulization every 4 (four) hours as needed for wheezing or shortness of breath   amiodarone 100 mg tablet   No No   Sig: TAKE 1 TABLET BY MOUTH DAILY WITH BREAKFAST     apixaban (ELIQUIS) 5 mg  Self No No   Sig: Take 1 tablet (5 mg total) by mouth 2 (two) times a day   atorvastatin (LIPITOR) 40 mg tablet  Self No No   Sig: Take 1 tablet (40 mg total) by mouth daily   bumetanide (BUMEX) 2 mg tablet   No No   Sig: Take 1 tablet (2 mg total) by mouth 2 (two) times a day   chlorhexidine (PERIDEX) 0 12 % solution  Self No No   Sig: Apply 15 mL to the mouth or throat every 12 (twelve) hours   escitalopram (LEXAPRO) 10 mg tablet   No No   Sig: TAKE 1 TABLET BY MOUTH EVERY DAY   famotidine (PEPCID) 20 mg/2 5 mL oral suspension  Self No No   Sig: Take 2 5 mL (20 mg total) by mouth 2 (two) times a day   insulin aspart (NovoLOG FlexPen) 100 UNIT/ML injection pen   No No   Sig: Inject 4 Units under the skin 3 (three) times a day with meals   insulin glargine (Basaglar KwikPen) 100 units/mL injection pen   No No   Sig: Inject 16 Units under the skin daily   ipratropium (ATROVENT) 0 02 % nebulizer solution  Self No No   Sig: Take 2 5 mL (0 5 mg total) by nebulization 3 (three) times a day   levalbuterol (XOPENEX) 1 25 mg/0 5 mL nebulizer solution  Self No No   Sig: Take 0 5 mL (1 25 mg total) by nebulization 3 (three) times a day   losartan (COZAAR) 50 mg tablet   No No   Sig: Take 1 tablet (50 mg total) by mouth every 12 (twelve) hours   magnesium Oxide (MAG-OX) 400 mg TABS   Yes No   Sig: TAKE 1 TABLET (400 MG TOTAL) BY MOUTH 2 TIMES A DAY   melatonin 3 mg  Self No No   Sig: Take 2 tablets (6 mg total) by mouth daily at bedtime   Patient not taking: No sig reported   metoprolol succinate (TOPROL-XL) 50 mg 24 hr tablet   No No   Sig: Take 1 tablet (50 mg total) by mouth every 12 (twelve) hours   pantoprazole (PROTONIX) 40 mg tablet   No No   Sig: Take 1 tablet (40 mg total) by mouth daily   polyethylene glycol (MIRALAX) 17 g packet  Self No No   Sig: Take 17 g by mouth daily   Patient not taking: No sig reported   potassium chloride 40 MEQ/15ML (20%) oral solution   No No   Sig: Take 15 mL (40 mEq total) by mouth 2 (two) times a day   pregabalin (LYRICA) 75 mg capsule  Self No No   Sig: TAKE ONE CAPSULE EVERY DAY   senna-docusate sodium (SENOKOT S) 8 6-50 mg per tablet  Self No No   Sig: Take 1 tablet by mouth daily at bedtime   Patient not taking: No sig reported   spironolactone (ALDACTONE) 100 mg tablet   No No   Sig: TAKE 1 TABLET BY MOUTH EVERY DAY   traZODone (DESYREL) 50 mg tablet  Self Yes No   Sig: TAKE 0 5 TABLET (25MG) BY MOUTH NIGHTLY AS NEEDED FOR SLEEP  Patient not taking: No sig reported      Facility-Administered Medications: None     Allergies   Allergen Reactions    Metformin Diarrhea    Clonidine Rash     Patch        Lab Results: I have personally reviewed pertinent lab results    , CBC:   Lab Results   Component Value Date    WBC 14 29 (H) 08/02/2022    HGB 8 2 (L) 08/02/2022    HCT 27 4 (L) 08/02/2022    MCV 76 (L) 08/02/2022     (H) 08/02/2022    MCH 22 7 (L) 08/02/2022    MCHC 29 9 (L) 08/02/2022    RDW 17 9 (H) 08/02/2022    MPV 9 8 08/02/2022   , CMP:   Lab Results   Component Value Date    SODIUM 132 (L) 08/02/2022    K 3 8 08/02/2022    CL 98 08/02/2022    CO2 27 08/02/2022    BUN 35 (H) 08/02/2022    CREATININE 1 14 08/02/2022    CALCIUM 8 5 08/02/2022    AST 16 08/02/2022    ALT 7 08/02/2022    ALKPHOS 75 08/02/2022    EGFR 53 08/02/2022       Counseling / Coordination of Care  Total floor / unit time spent today 25 minutes  Greater than 50% of total time was spent with the patient and / or family counseling and / or coordination of care      Inpatient consult to Cardiothoracic Surgery  Consult performed by: Elmira Harris MD  Consult ordered by: MD Elmria Gomez MD  8/2/2022 11:57 PM

## 2022-08-03 NOTE — ASSESSMENT & PLAN NOTE
BP stable  On home losartan 50 mg BID, follows closely with Cardiology  Losartan on hold but consider restarting as MODESTO has now resolved  Monitor BP closely

## 2022-08-03 NOTE — DISCHARGE INSTR - OTHER ORDERS
1 Cleanse sacral with soap and water, pat dry, and apply calazime cream TID and PRN   2  Apply skin nourishing cream the entire skin daily for moisture  3  Turn and reposition patient every  2 hours   4  Elevate heels off of bed with pillows to offload pressure   5  Apply EHOB waffle cushion to chair when OOB, if able  6  Apply Allevyn foam to heels, rené w/P, peel foam check skin integrity q-shift  Change q3d and PRN for soilage/dislodgement  7   Apply hydraguard to the b/l buttocks BID and PRN

## 2022-08-03 NOTE — PHYSICAL THERAPY NOTE
Physical Therapy Cancellation Note         08/03/22 0800   Note Type   Note type Cancelled Session   Cancel Reasons Medical status     PT order received; chart reviewed; noted pt is being considered for thoracic sx this admission; will hold PT assessment at this time; will follow and initiate PT eval based on clinical course      Rodriguez Germain, PT

## 2022-08-03 NOTE — CONSULTS
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  HF 50% EF  Afib on Eliquis  CAD s/p arrest + PCI on Brilinta  CKD III  T2DM      Plan:  Empyema  Pleural fluid analysis showing predominantly neutrophilic WBC count with cultures positive for MRSA  Continue with IV vancomycin as per ID given positive MRSA  Chest tube in place, continue with suction -40  No air leak at this morning    S/p tPA DNase x2, pending evaluation for VATS procedure via thoracic surgery  No recorded output as of this AM  Oxygen supplementation as necessary  Improving since consolidation and effusion as of this morning's x-ray  Monitor fever curve  Daily CBC to trend WBC  Cardiology consulted by Thoracic for cardiac clearance  Recurrent pneumothorax  Chest tube in place, management as above  Pain regimen as per primary, has not appeared to need many opioids for relief  Chronic trach dependence on 10L baseline  Trach placed in context of cardiac arrest/prolonged ventilator dependent state in November 2021, decannulated at [facility] in December 2021  Passed by speech for regular diet  Currently on 8L NC as of this AM  Monitor tracheostomy site  Rest of management as per primary team    History of Present Illness   HPI:  Michelle Roa is a 65 y o  female with a past medical history reviewed for chronic tracheostomy on 10 L baseline, CKD 3, CAD s/p arrest s/p PCI on Brilinta, heart failure with 50%, AFib on Eliquis, type 2 diabetes mellitus who presented to [address] [place] [address],[address] with chest pain and shortness of breath initially thought to be due to CHF exacerbation which improved with IV Lasix however later had persistent shortness of breath and cough with repeat imaging showcasing new small right apical pneumothorax  Patient required transfer to [facility] for chest tube placement  During that hospitalization, patient required tPA DNase twice on 07/30 and 7/31 with subsequent bleeding in chest tube  Patient had repeat imaging after chest tube was draining minimal amounts of red blood and found to have continued loculations  APS was consulted for pain management inpatient was narrowed down to vancomycin due to positive MRSA  Due to continued loculations, patient was transferred to [facility] for VATS evaluation  Upon arrival patient was on 12 L via trach collar  CT imaging and chest x-ray at [facility] showed improving consolidation and effusions on the right side  Patient appears to have chest tube removed on 7/24 initially and replaced on 7/29  Patient seen and evaluated this AM resting comfortably in bed  Patient notes that she feels roughly the same and has not had much improvement since arrival to [facility]  Continues to endorse pain in the right chest wall region  Review of systems:  12 point review of systems was completed and was otherwise negative except as listed in HPI  Historical Information   Past Medical History:   Diagnosis Date    Anxiety     Aortic aneurysm ([address] )     Arthritis     Depression     Diabetes mellitus (HCC)     Fibromyalgia     GERD (gastroesophageal reflux disease)     GERD (gastroesophageal reflux disease)     H/O cardiovascular stress test 09/2018    no ischemia  EF 70%   H/O echocardiogram 01/2019    EF 60%  Mild LVH  trivial effusion   Hyperlipidemia     Hypertension     Migraines     Psychiatric disorder     anxiety    Uncontrolled hypertension 2/25/2015    Last Assessment & Plan:  BP today above goal <140/90, apparently asymptomatic  Prior BP elevations were attributed to not taking medication  Consider increased medication if persistent on f/u       Past Surgical History:   Procedure Laterality Date  BACK SURGERY      Lumbar epidural steroid injection    CARDIAC CATHETERIZATION N/A 10/22/2021    Procedure: Cardiac pci;  Surgeon: Rosario Olmos MD;  [facility]; Service: Cardiology    CARDIAC CATHETERIZATION  10/22/2021    Procedure: Cardiac catheterization;  Surgeon: Rosario Olmos MD;  [facility]; Service: Cardiology    CARDIAC CATHETERIZATION Left 10/27/2021    Procedure: Cardiac Left Heart Cath;  Surgeon: Kim Steiner MD;  [facility]; Service: Cardiology    CARDIAC CATHETERIZATION N/A 10/27/2021    Procedure: Cardiac pci;  Surgeon: Kim Steiner MD;  [facility]; Service: Cardiology    CARDIAC CATHETERIZATION N/A 10/28/2021    Procedure: Cardiac pci;  Surgeon: Pao Jorgensen DO;  [facility];   Service: Cardiology    CARPAL TUNNEL RELEASE Left      SECTION      CHOLECYSTECTOMY      COLONOSCOPY      incomplete    COLONOSCOPY      EYE SURGERY      HYSTERECTOMY      Total    IR CHEST TUBE PLACEMENT  2022    IR CHEST TUBE PLACEMENT  2022    IR CHEST TUBE PLACEMENT  2022    OVARIAN CYST REMOVAL      PEG W/TRACHEOSTOMY PLACEMENT N/A 2021    Procedure: TRACHEOSTOMY WITH INSERTION PEG TUBE;  Surgeon: Adrienne Fung DO;  Location: [facility] MAIN [facility];  Service: General    TUBAL LIGATION      UPPER GASTROINTESTINAL ENDOSCOPY       Family History   Problem Relation Age of Onset    Hypertension Mother    Denny Racine Arthritis Mother     Diabetes Father     Other Sister         renal cell carcinoma    Diabetes Other     Factor V Leiden deficiency Other        Occupational History: N/A    Social History: N/A    Meds/Allergies   Current Facility-Administered Medications   Medication Dose Route Frequency    acetaminophen (TYLENOL) tablet 975 mg  975 mg Oral Q8H [address]    albuterol inhalation solution 2 5 mg  2 5 mg Nebulization Q4H PRN    albuterol inhalation solution 2 5 mg  2 5 mg Nebulization Q4H PRN    amiodarone tablet 100 mg  100 mg Oral Daily With Breakfast    apixaban (ELIQUIS) tablet 5 mg  5 mg Oral BID    atorvastatin (LIPITOR) tablet 40 mg  40 mg Oral Daily With Dinner    benzonatate (TESSALON PERLES) capsule 100 mg  100 mg Oral TID PRN    bumetanide (BUMEX) tablet 2 mg  2 mg Oral BID    diazepam (VALIUM) tablet 5 mg  5 mg Oral Q6H PRN    escitalopram (LEXAPRO) tablet 10 mg  10 mg Oral Daily    ferrous sulfate tablet 325 mg  325 mg Oral Daily With Breakfast    guaiFENesin (MUCINEX) 12 hr tablet 1,200 mg  1,200 mg Oral Q12H CEFERINO    HYDROmorphone (DILAUDID) injection 0 5 mg  0 5 mg Intravenous Q2H PRN    HYDROmorphone (DILAUDID) tablet 2 mg  2 mg Oral Q4H PRN    HYDROmorphone (DILAUDID) tablet 4 mg  4 mg Oral Q4H PRN    hydrOXYzine HCL (ATARAX) tablet 25 mg  25 mg Oral Q6H PRN    insulin glargine (LANTUS) subcutaneous injection 45 Units 0 45 mL  45 Units Subcutaneous HS    insulin lispro (HumaLOG) 100 units/mL subcutaneous injection 2-12 Units  2-12 Units Subcutaneous TID AC    insulin lispro (HumaLOG) 100 units/mL subcutaneous injection 2-12 Units  2-12 Units Subcutaneous HS    insulin lispro (HumaLOG) 100 units/mL subcutaneous injection 22 Units  22 Units Subcutaneous TID With Meals    ipratropium (ATROVENT) 0 02 % inhalation solution 0 5 mg  0 5 mg Nebulization TID    levalbuterol (XOPENEX) inhalation solution 1 25 mg  1 25 mg Nebulization TID    lidocaine (LIDODERM) 5 % patch 2 patch  2 patch Topical Daily    metoprolol succinate (TOPROL-XL) 24 hr tablet 50 mg  50 mg Oral Q12H    pantoprazole (PROTONIX) EC tablet 40 mg  40 mg Oral Early Morning    polyethylene glycol (MIRALAX) packet 17 g  17 g Oral Daily    pregabalin (LYRICA) capsule 25 mg  25 mg Oral QPM    pregabalin (LYRICA) capsule 75 mg  75 mg Oral Daily    senna-docusate sodium (SENOKOT S) 8 6-50 mg per tablet 1 tablet  1 tablet Oral BID    spironolactone (ALDACTONE) tablet 100 mg  100 mg Oral Daily    ticagrelor (BRILINTA) tablet 90 mg  90 mg Oral Q12H    trimethobenzamide (TIGAN) IM injection 200 mg  200 mg Intramuscular Q6H PRN    vancomycin (VANCOCIN) 1,250 mg in sodium chloride 0 9 % 250 mL IVPB  1,250 mg Intravenous Q24H     Medications Prior to Admission   Medication    escitalopram (LEXAPRO) 10 mg tablet    acetaminophen (TYLENOL) 160 mg/5 mL suspension    albuterol (2 5 mg/3 mL) 0 083 % nebulizer solution    amiodarone 100 mg tablet    apixaban (ELIQUIS) 5 mg    atorvastatin (LIPITOR) 40 mg tablet    Benzocaine-Menthol 15-2 6 MG LOZG    Blood Pressure Monitoring (Sphygmomanometer) MISC    Brilinta 90 MG    bumetanide (BUMEX) 2 mg tablet    chlorhexidine (PERIDEX) 0 12 % solution    famotidine (PEPCID) 20 mg/2 5 mL oral suspension    insulin aspart (NovoLOG FlexPen) 100 UNIT/ML injection pen    insulin glargine (Basaglar KwikPen) 100 units/mL injection pen    Insulin Pen Needle 31G X 6 MM MISC    ipratropium (ATROVENT) 0 02 % nebulizer solution    Lancets MISC    levalbuterol (XOPENEX) 1 25 mg/0 5 mL nebulizer solution    LORazepam (Ativan) 1 mg tablet    losartan (COZAAR) 50 mg tablet    magnesium Oxide (MAG-OX) 400 mg TABS    melatonin 3 mg    metoprolol succinate (TOPROL-XL) 50 mg 24 hr tablet    Novofine Pen Needle 32G X 6 MM MISC    pantoprazole (PROTONIX) 40 mg tablet    polyethylene glycol (MIRALAX) 17 g packet    potassium chloride 40 MEQ/15ML (20%) oral solution    pregabalin (LYRICA) 75 mg capsule    senna-docusate sodium (SENOKOT S) 8 6-50 mg per tablet    spironolactone (ALDACTONE) 100 mg tablet    traZODone (DESYREL) 50 mg tablet     Allergies   Allergen Reactions    Metformin Diarrhea    Clonidine Rash     Patch        Vitals: Blood pressure 119/74, pulse 70, temperature (!) 97 3 °F (36 3 °C), resp  rate 16, SpO2 98 %  There is no height or weight on file to calculate BMI        Intake/Output Summary (Last 24 hours) at 8/3/2022 1015  Last data filed at 8/3/2022 0809  Gross per 24 hour   Intake 480 ml   Output 625 ml   Net -145 ml       Physical Exam  Vitals reviewed  Constitutional:       General: She is not in acute distress  Appearance: Normal appearance  She is not ill-appearing  HENT:      Head: Normocephalic and atraumatic  Eyes:      Conjunctiva/sclera: Conjunctivae normal    Cardiovascular:      Rate and Rhythm: Normal rate  Pulses: Normal pulses  Pulmonary:      Effort: Pulmonary effort is normal       Breath sounds: Decreased breath sounds present  Comments: Trach collar in place, no erythema  Abdominal:      General: Abdomen is flat  There is no distension  Palpations: Abdomen is soft  Tenderness: There is no abdominal tenderness  There is no guarding  Musculoskeletal:      Right lower leg: No edema  Left lower leg: No edema  Skin:     General: Skin is warm  Neurological:      Mental Status: She is alert  Mental status is at baseline  Psychiatric:         Mood and Affect: Mood normal          Behavior: Behavior normal          Labs: I have personally reviewed pertinent lab results    Laboratory and Diagnostics  Results from last 7 days   Lab Units 08/03/22  0559 08/02/22  1059 08/01/22 2026 08/01/22 0457 07/31/22 0443 07/30/22  0459 07/29/22 0447 07/28/22  0504   WBC Thousand/uL 9 84 14 29*  --  17 46* 16 13* 18 08* 17 26* 17 34*   HEMOGLOBIN g/dL 7 9* 8 2* 7 7* 7 8* 8 4* 7 6* 8 2* 8 4*   HEMATOCRIT % 27 4* 27 4* 25 7* 27 8* 28 1* 25 0* 27 5* 26 7*   PLATELETS Thousands/uL 611* 625*  --  611* 617* 583* 505* 487*   NEUTROS PCT %  --   --   --   --  79* 85* 75 83*   MONOS PCT %  --   --   --   --  10 8 9 9   MONO PCT %  --  8  --  5  --   --   --   --      Results from last 7 days   Lab Units 08/03/22  0559 08/02/22  0634 08/01/22 0457 07/31/22 0443 07/30/22  0459 07/29/22 0447 07/28/22  0504   SODIUM mmol/L 132* 132* 134* 133* 131* 131* 129*   POTASSIUM mmol/L 3 7 3 8 4 1 4 2 4 4 4 3 4 4   CHLORIDE mmol/L 101 98 99 100 98 96 95*   CO2 mmol/L 26 27 25 21 24 26 26   ANION GAP mmol/L 5 7 10 12 9 9 8   BUN mg/dL 39* 35* 40* 49* 50* 47* 43*   CREATININE mg/dL 1 34* 1 14 1 07 1 12 1 19 1 33* 1 08   CALCIUM mg/dL 8 9 8 5 8 3* 8 1* 8 1* 8 5 8 4   GLUCOSE RANDOM mg/dL 84 138 125 244* 271* 99 205*   ALT U/L 18 7  --   --   --  6*  --    AST U/L 33 16  --   --   --  9*  --    ALK PHOS U/L 81 75  --   --   --  91  --    ALBUMIN g/dL 1 8* 2 7*  --   --   --  2 6*  --    TOTAL BILIRUBIN mg/dL 0 27 0 35  --   --   --  0 55  --                            Results from last 7 days   Lab Units 07/29/22  0447   LD U/L 384*             Results from last 7 days   Lab Units 08/03/22  0559 07/29/22  0447   PROCALCITONIN ng/ml 0 46* 0 77*       ABG:       Imaging and other studies: I have personally reviewed pertinent films in PACS  XR chest portable    Result Date: 8/3/2022  Impression: Improving right midlung field and bibasilar consolidation and effusion  Trace left pleural effusion persists  Stable position of right basilar pleural drainage catheter and tracheostomy  Workstation performed: [id number]     CT chest wo contrast    Result Date: 8/2/2022  Impression: 1  Decreased size of a right-sided pleural effusion and improved aeration of the right lower lobe post placement of a right pleural drainage catheter, with unchanged appearance of a loculated component of the effusion superomedially  There is slightly increased density of the effusion compatible with a small amount of blood products  2   Appearance of new inflammatory groundglass opacities in the left lower lobe  3   Mild background interstitial edema is unchanged  The study was marked in EPIC for significant notification    Workstation performed: [id number]       Pulmonary function testing: No PFTs on file    EKG, Pathology, and Other Studies: I have personally reviewed pertinent films in PACS      Code Status: Level 1 - Full Code    VTE Pharmacologic Prophylaxis: Reason for no pharmacologic prophylaxis On Eliquis  VTE Mechanical Prophylaxis: sequential compression device    Disclaimer: Portions of the record may have been created with voice recognition software  Occasional wrong word or "sound a like" substitutions may have occurred due to the inherent limitations of voice recognition software  Careful consideration should be taken to recognize, using context, where substitutions have occurred      Vandana Stringer, DO   Internal Medicine PGY-II  [address] [place] [address]

## 2022-08-03 NOTE — ASSESSMENT & PLAN NOTE
Lab Results   Component Value Date    HGBA1C 12 7 (H) 05/22/2022     Recent Labs     08/01/22  2105 08/02/22  0738 08/02/22  1103 08/02/22  1538   POCGLU 174* 160* 158* 207*     Blood Sugar Average: Last 72 hrs:  · (P) 197 875   · Very poorly controlled based on A1c  · Continue basal/bolus insulin to 45 units qHS and 22 units Humalog TID with meals today  · SSI for coverage as well     · ADA diet, Accu-Cheks a c  HS

## 2022-08-03 NOTE — PROGRESS NOTES
General Cardiology   Progress Note -  Team One   Michelle Roa 65 y o  female MRN: [id number]    Unit/Bed#: [facility] 429-01 Encounter: [id number]    Assessment  1  Acute on Chronic HFmEF (POA) -- now resolved  -Recent prolonged hospital stay at the [facility] for acute CHF exacerbation which is currently resolved  Transferred over to the [facility] given loculated/ recurrent pleural effusion/empyema for thoracic surgery eval     2  ICM w/improved EF to 50% (previously 40-45% in 10/2021)  -On PE -- appears euvolemic  -BNP level 165 (7/24  -TTE 2/2022 -- LVEF 50%, moderate hypokinesis of the inferior in the basal anterior lateral wall, moderate concentric hypertrophy, LA/RA mildly dilated, RV normal size/systolic function, mild TR   -GDMT -metoprolol succinate 50 mg b i d; previously on losartan 50 mg daily (currently on hold due to borderline BP)  -Underwent a course of IV diuresis earlier this admission which had been discontinued on 7/27  Was transitioned to oral Bumex 2 mg b i d on 7/29 + spironolactone 100 mg daily  -24 hour I&O balance; -505 mL; overall - 145 mL  -Weights; baseline/dry weights appear to be in the low to mid 180s   Standing scale weight on 7/29 - 171 lb     3  Recurrent/loculated pleural effusion, empyema  -Multi disciplinary management including Pulmonary, Infectious Disease, and now Thoracic Surgery  4  CAD / lateral wall STEMI (10/22/2021) s/p thrombectomy PCI/PATRICA x 1 ([facility]) to mid LCX into OM1 which jailed OM2 s/p PTCA  -[facility] 10/28/21-- patent OM1 stent, jailed mLCx, and new distal LAD occlusion too small for intervention     -On Brilinta 90 mg q12h, Eliquis 5 mg b i d , high-intensity statin, and BB      4  Paroxysmal atrial fibrillation  5   Hx of VT cardiac arrest  -Maintaining NSR   -On amiodarone 100 mg daily, and metoprolol succinate 50 mg b i d   -Pt was wearing life vest at home, was to see EP in the outpatient setting regarding ICD eval      6  Essential hypertension  -Average /70, last recorded 119/74, HR 70    -BP regimen -Bumex 2 mg b i d,  metoprolol succinate 50 mg b i d , and spironolactone 100 mg daily  7  Poorly controlled DM  -Management per the primary team   -HGB A1c 12 7 - 5/2022  On subcutaneous insulin  Plan  -No current cardiac contraindications to proceeding with outlined surgical plan per the thoracic surgery team   -Thoracic surgery requesting withholding of anti-platelet (Brilinta) and anticoagulant (Eliquis) preoperatively for perioperative bleeding risk reduction  Her previous stent that was placed in October 2021 was an [address] PATRICA in which dual anti-platelet therapy can safely be discontinued after 28 days per their clinical trials  Okay to place Eliquis on hold for at least 2 days prior to surgery  -Appears compensated from a CHF perspective  Continue oral diuretics which include Bumex 2 mg b i d , and spironolactone 100 mg daily  -Maintaining NSR on telemetry monitoring, continue amiodarone 100 mg daily, and metoprolol succinate 50 mg b i d  -Continue strict I&Os, daily standing weights, and 2 g NA +diet 1 8 L FR    -Monitor renal function electrolytes closely, replete to maintain K +level 4 0 and magnesium at 2 0   -Eventual EP evaluation for ICD consideration likely to occur as an outpatient  Reapplication of Life Vest on DC         Subjective  Review of Systems   Constitutional: Positive for malaise/fatigue  Negative for chills and fever  Eyes: Negative for visual disturbance  Cardiovascular: Negative for chest pain, dyspnea on exertion, leg swelling, orthopnea and palpitations  Respiratory: Positive for cough  Negative for shortness of breath  Gastrointestinal: Negative for abdominal pain  Neurological: Negative for dizziness, headaches and light-headedness  Objective:   Physical Exam  Vitals and nursing note reviewed     Constitutional:       General: She is not in acute distress  Appearance: She is not diaphoretic  HENT:      Head: Normocephalic and atraumatic  Mouth/Throat:      Mouth: Mucous membranes are dry  Eyes:      General: No scleral icterus  Neck:      Comments: Trach site/cannula with humidified O2  Cardiovascular:      Rate and Rhythm: Normal rate and regular rhythm  Pulses: Normal pulses  Heart sounds: Normal heart sounds  No murmur heard  Pulmonary:      Effort: Pulmonary effort is normal       Breath sounds: No wheezing  Comments: Bilateral lower lung fields are diminished  Abdominal:      Palpations: Abdomen is soft  Tenderness: There is no abdominal tenderness  Musculoskeletal:      Cervical back: Neck supple  Right lower leg: No edema  Left lower leg: No edema  Skin:     General: Skin is warm and dry  Capillary Refill: Capillary refill takes less than 2 seconds  Neurological:      General: No focal deficit present  Mental Status: She is alert and oriented to person, place, and time  Psychiatric:         Mood and Affect: Mood normal          Vitals: Blood pressure 119/74, pulse 70, temperature (!) 97 3 °F (36 3 °C), resp  rate 16, SpO2 98 %  ,     There is no height or weight on file to calculate BMI ,   Systolic (52BSP), BJC:981 , Min:100 , PR     Diastolic (70UNJ), MGQ:99, Min:60, Max:80      Intake/Output Summary (Last 24 hours) at 8/3/2022 0950  Last data filed at 8/3/2022 0809  Gross per 24 hour   Intake 480 ml   Output 625 ml   Net -145 ml     Weight (last 2 days)     None          LABORATORY RESULTS      CBC with diff:   Results from last 7 days   Lab Units 22  0559 22  0634 22  0457 22  0443 22  0459 22  0447 22  0504   WBC Thousand/uL 9 84 14 29*  --  17 46* 16 13* 18 08* 17 26* 17 34*   HEMOGLOBIN g/dL 7 9* 8 2* 7 7* 7 8* 8 4* 7 6* 8 2* 8 4*   HEMATOCRIT % 27 4* 27 4* 25 7* 27 8* 28 1* 25 0* 27 5* 26 7*   MCV fL 78* 76*  --  78* 77* 75* 77* 75*   PLATELETS Thousands/uL 611* 625*  --  611* 617* 583* 505* 487*   MCH pg 22 4* 22 7*  --  22 0* 23 0* 22 8* 22 9* 23 6*   MCHC g/dL 28 8* 29 9*  --  28 1* 29 9* 30 4* 29 8* 31 5   RDW % 18 1* 17 9*  --  17 8* 18 0* 17 5* 17 3* 17 3*   MPV fL 9 9 9 8  --  9 5 9 6 9 5 9 4 10 0   NRBC AUTO /100 WBCs  --   --   --   --  0 0 0 0       CMP:  Results from last 7 days   Lab Units 08/03/22  0559 08/02/22  0634 08/01/22  0457 07/31/22 0443 07/30/22 0459 07/29/22 0447 07/28/22  0504   POTASSIUM mmol/L 3 7 3 8 4 1 4 2 4 4 4 3 4 4   CHLORIDE mmol/L 101 98 99 100 98 96 95*   CO2 mmol/L 26 27 25 21 24 26 26   BUN mg/dL 39* 35* 40* 49* 50* 47* 43*   CREATININE mg/dL 1 34* 1 14 1 07 1 12 1 19 1 33* 1 08   CALCIUM mg/dL 8 9 8 5 8 3* 8 1* 8 1* 8 5 8 4   AST U/L 33 16  --   --   --  9*  --    ALT U/L 18 7  --   --   --  6*  --    ALK PHOS U/L 81 75  --   --   --  91  --    EGFR ml/min/1 73sq m 44 53 58 55 51 44 57       BMP:  Results from last 7 days   Lab Units 08/03/22  0559 08/02/22  0634 08/01/22  0457 07/31/22 0443 07/30/22  0459 07/29/22 0447 07/28/22  0504   POTASSIUM mmol/L 3 7 3 8 4 1 4 2 4 4 4 3 4 4   CHLORIDE mmol/L 101 98 99 100 98 96 95*   CO2 mmol/L 26 27 25 21 24 26 26   BUN mg/dL 39* 35* 40* 49* 50* 47* 43*   CREATININE mg/dL 1 34* 1 14 1 07 1 12 1 19 1 33* 1 08   CALCIUM mg/dL 8 9 8 5 8 3* 8 1* 8 1* 8 5 8 4       Lab Results   Component Value Date    NTBNP 4,179 (H) 06/27/2022    NTBNP 4,406 (H) 06/06/2022    NTBNP 3,158 (H) 10/22/2021                                Lipid Profile:   No results found for: CHOL  Lab Results   Component Value Date    HDL 55 10/23/2021    HDL 45 (L) 04/02/2021    HDL 44 05/26/2020     Lab Results   Component Value Date    LDLCALC 36 10/23/2021    LDLCALC 44 04/02/2021    LDLCALC 68 05/26/2020     Lab Results   Component Value Date    TRIG 75 10/23/2021    TRIG 133 7 04/02/2021    TRIG 157 (H) 05/26/2020       Cardiac testing:   No results found for this or any previous visit  No results found for this or any previous visit  No results found for this or any previous visit  No valid procedures specified  No results found for this or any previous visit        Meds/Allergies   all current active meds have been reviewed and current meds:   Current Facility-Administered Medications   Medication Dose Route Frequency    acetaminophen (TYLENOL) tablet 975 mg  975 mg Oral Q8H [facility]    albuterol inhalation solution 2 5 mg  2 5 mg Nebulization Q4H PRN    albuterol inhalation solution 2 5 mg  2 5 mg Nebulization Q4H PRN    amiodarone tablet 100 mg  100 mg Oral Daily With Breakfast    apixaban (ELIQUIS) tablet 5 mg  5 mg Oral BID    atorvastatin (LIPITOR) tablet 40 mg  40 mg Oral Daily With Dinner    benzonatate (TESSALON PERLES) capsule 100 mg  100 mg Oral TID PRN    bumetanide (BUMEX) tablet 2 mg  2 mg Oral BID    diazepam (VALIUM) tablet 5 mg  5 mg Oral Q6H PRN    escitalopram (LEXAPRO) tablet 10 mg  10 mg Oral Daily    ferrous sulfate tablet 325 mg  325 mg Oral Daily With Breakfast    guaiFENesin (MUCINEX) 12 hr tablet 1,200 mg  1,200 mg Oral Q12H CEFERINO    HYDROmorphone (DILAUDID) injection 0 5 mg  0 5 mg Intravenous Q2H PRN    HYDROmorphone (DILAUDID) tablet 2 mg  2 mg Oral Q4H PRN    HYDROmorphone (DILAUDID) tablet 4 mg  4 mg Oral Q4H PRN    hydrOXYzine HCL (ATARAX) tablet 25 mg  25 mg Oral Q6H PRN    insulin glargine (LANTUS) subcutaneous injection 45 Units 0 45 mL  45 Units Subcutaneous HS    insulin lispro (HumaLOG) 100 units/mL subcutaneous injection 2-12 Units  2-12 Units Subcutaneous TID AC    insulin lispro (HumaLOG) 100 units/mL subcutaneous injection 2-12 Units  2-12 Units Subcutaneous HS    insulin lispro (HumaLOG) 100 units/mL subcutaneous injection 22 Units  22 Units Subcutaneous TID With Meals    ipratropium (ATROVENT) 0 02 % inhalation solution 0 5 mg  0 5 mg Nebulization TID    levalbuterol (XOPENEX) inhalation solution 1 25 mg  1 25 mg Nebulization TID    lidocaine (LIDODERM) 5 % patch 2 patch  2 patch Topical Daily    metoprolol succinate (TOPROL-XL) 24 hr tablet 50 mg  50 mg Oral Q12H    pantoprazole (PROTONIX) EC tablet 40 mg  40 mg Oral Early Morning    polyethylene glycol (MIRALAX) packet 17 g  17 g Oral Daily    pregabalin (LYRICA) capsule 25 mg  25 mg Oral QPM    pregabalin (LYRICA) capsule 75 mg  75 mg Oral Daily    senna-docusate sodium (SENOKOT S) 8 6-50 mg per tablet 1 tablet  1 tablet Oral BID    spironolactone (ALDACTONE) tablet 100 mg  100 mg Oral Daily    ticagrelor (BRILINTA) tablet 90 mg  90 mg Oral Q12H    trimethobenzamide (TIGAN) IM injection 200 mg  200 mg Intramuscular Q6H PRN    vancomycin (VANCOCIN) 1,250 mg in sodium chloride 0 9 % 250 mL IVPB  1,250 mg Intravenous Q24H        EKG personally reviewed by NEELAM Frankel    Assessment:  Principal Problem:    Empyema lung, Right  Active Problems:    Anxiety    Diabetic peripheral neuropathy (HCC)    Hyperlipidemia associated with type 2 diabetes mellitus (HCC)    Hypertension    Type 2 diabetes mellitus with hyperglycemia (HCC)    A-fib ([facility])    CAD (coronary artery disease)    Tracheostomy in place ([facility] [address] )    Anemia    CKD (chronic kidney disease) stage 3, GFR 30-59 ml/min ([facility])    Acute Respiratory insufficiency on chronic hypoxic respiratory failure    Pain at Chest tube insertion site  Chest wall wound infection    Acute on chronic heart failure with preserved ejection fraction ([facility] [address] )    Counseling / Coordination of Care  Total floor / unit time spent today 20 minutes  Greater than 50% of total time was spent with the patient and / or family counseling and / or coordination of care  ** Please Note: Dragon 360 Dictation voice to text software may have been used in the creation of this document   **

## 2022-08-03 NOTE — ASSESSMENT & PLAN NOTE
· STEMI 10/22/2021 s/p PATRICA mid circumflex OM1  OM2 was ballooned but not stented  · Pulseless VT 10/27/21 - repeat LHC 90% mid circumflex stenosis, but could not be intervened on  · VT 10/28/21 LHC:  found to have apical LAD complete occlusion was felt to be small to be intervened    · Cardiac carrest 1/1/22 - [facility] felt to be hypoxic vs  VT induced  · On beta-blocker, Brilinta and Lipitor

## 2022-08-03 NOTE — ASSESSMENT & PLAN NOTE
· Continues with intractable pain at prior chest tube site on right anterior chest wall  This was removed 7/24/22  · Was followed by APS back in [facility], S/P epidural catheter which was removed 8/2  · Current pain regimen: Diaz Tylenol 975 Q8h, PO Dilaudid 2/4 for mod/severe, IV Dilaudid 0 5 mg Q2  · Bowel regimen on board:  Scheduled senna and MiraLax daily  · Plan to discontinue IV Dilaudid when Chest tube removed

## 2022-08-03 NOTE — ASSESSMENT & PLAN NOTE
· Patient noted on imaging to have small pneumothorax at [facility], subsequently transferred to [facility]  · Chest tube placed 7/20, then upsized on 7/22 given no significant change and appearance was placed to gravity

## 2022-08-04 ENCOUNTER — TRANSITIONAL CARE MANAGEMENT (OUTPATIENT)
Dept: FAMILY MEDICINE CLINIC | Facility: CLINIC | Age: 57
End: 2022-08-04

## 2022-08-04 LAB
ABO GROUP BLD: NORMAL
ANION GAP SERPL CALCULATED.3IONS-SCNC: 8 MMOL/L (ref 4–13)
APTT PPP: 33 SECONDS (ref 23–37)
BLD GP AB SCN SERPL QL: NEGATIVE
BUN SERPL-MCNC: 42 MG/DL (ref 5–25)
CALCIUM SERPL-MCNC: 8.2 MG/DL (ref 8.3–10.1)
CHLORIDE SERPL-SCNC: 99 MMOL/L (ref 96–108)
CO2 SERPL-SCNC: 26 MMOL/L (ref 21–32)
CREAT SERPL-MCNC: 1.61 MG/DL (ref 0.6–1.3)
ERYTHROCYTE [DISTWIDTH] IN BLOOD BY AUTOMATED COUNT: 17.8 % (ref 11.6–15.1)
GFR SERPL CREATININE-BSD FRML MDRD: 35 ML/MIN/1.73SQ M
GLUCOSE SERPL-MCNC: 113 MG/DL (ref 65–140)
GLUCOSE SERPL-MCNC: 139 MG/DL (ref 65–140)
GLUCOSE SERPL-MCNC: 148 MG/DL (ref 65–140)
GLUCOSE SERPL-MCNC: 185 MG/DL (ref 65–140)
GLUCOSE SERPL-MCNC: 79 MG/DL (ref 65–140)
GLUCOSE SERPL-MCNC: 87 MG/DL (ref 65–140)
HCT VFR BLD AUTO: 23.5 % (ref 34.8–46.1)
HGB BLD-MCNC: 6.8 G/DL (ref 11.5–15.4)
INR PPP: 1.28 (ref 0.84–1.19)
MCH RBC QN AUTO: 22.4 PG (ref 26.8–34.3)
MCHC RBC AUTO-ENTMCNC: 28.9 G/DL (ref 31.4–37.4)
MCV RBC AUTO: 78 FL (ref 82–98)
NRBC BLD AUTO-RTO: 0 /100 WBCS
PLATELET # BLD AUTO: 535 THOUSANDS/UL (ref 149–390)
PMV BLD AUTO: 10 FL (ref 8.9–12.7)
POTASSIUM SERPL-SCNC: 4.2 MMOL/L (ref 3.5–5.3)
PROTHROMBIN TIME: 16.2 SECONDS (ref 11.6–14.5)
RBC # BLD AUTO: 3.03 MILLION/UL (ref 3.81–5.12)
RH BLD: POSITIVE
SODIUM SERPL-SCNC: 133 MMOL/L (ref 135–147)
SPECIMEN EXPIRATION DATE: NORMAL
VANCOMYCIN TROUGH SERPL-MCNC: 17.5 UG/ML (ref 10–20)
WBC # BLD AUTO: 7.56 THOUSAND/UL (ref 4.31–10.16)

## 2022-08-04 PROCEDURE — 94640 AIRWAY INHALATION TREATMENT: CPT

## 2022-08-04 PROCEDURE — 82948 REAGENT STRIP/BLOOD GLUCOSE: CPT

## 2022-08-04 PROCEDURE — 85610 PROTHROMBIN TIME: CPT

## 2022-08-04 PROCEDURE — 80202 ASSAY OF VANCOMYCIN: CPT | Performed by: INTERNAL MEDICINE

## 2022-08-04 PROCEDURE — 86923 COMPATIBILITY TEST ELECTRIC: CPT

## 2022-08-04 PROCEDURE — 86900 BLOOD TYPING SEROLOGIC ABO: CPT | Performed by: SURGERY

## 2022-08-04 PROCEDURE — 80048 BASIC METABOLIC PNL TOTAL CA: CPT | Performed by: FAMILY MEDICINE

## 2022-08-04 PROCEDURE — 99231 SBSQ HOSP IP/OBS SF/LOW 25: CPT | Performed by: THORACIC SURGERY (CARDIOTHORACIC VASCULAR SURGERY)

## 2022-08-04 PROCEDURE — 85027 COMPLETE CBC AUTOMATED: CPT | Performed by: FAMILY MEDICINE

## 2022-08-04 PROCEDURE — 99233 SBSQ HOSP IP/OBS HIGH 50: CPT | Performed by: INTERNAL MEDICINE

## 2022-08-04 PROCEDURE — 85730 THROMBOPLASTIN TIME PARTIAL: CPT

## 2022-08-04 PROCEDURE — 86901 BLOOD TYPING SEROLOGIC RH(D): CPT | Performed by: SURGERY

## 2022-08-04 PROCEDURE — P9016 RBC LEUKOCYTES REDUCED: HCPCS

## 2022-08-04 PROCEDURE — 99232 SBSQ HOSP IP/OBS MODERATE 35: CPT | Performed by: INTERNAL MEDICINE

## 2022-08-04 PROCEDURE — 94760 N-INVAS EAR/PLS OXIMETRY 1: CPT

## 2022-08-04 PROCEDURE — 99223 1ST HOSP IP/OBS HIGH 75: CPT

## 2022-08-04 PROCEDURE — 86850 RBC ANTIBODY SCREEN: CPT | Performed by: SURGERY

## 2022-08-04 PROCEDURE — 99232 SBSQ HOSP IP/OBS MODERATE 35: CPT | Performed by: FAMILY MEDICINE

## 2022-08-04 RX ORDER — HEPARIN SODIUM 10000 [USP'U]/100ML
3-20 INJECTION, SOLUTION INTRAVENOUS
Status: DISCONTINUED | OUTPATIENT
Start: 2022-08-04 | End: 2022-08-06

## 2022-08-04 RX ORDER — HEPARIN SODIUM 1000 [USP'U]/ML
2000 INJECTION, SOLUTION INTRAVENOUS; SUBCUTANEOUS
Status: DISCONTINUED | OUTPATIENT
Start: 2022-08-04 | End: 2022-08-06

## 2022-08-04 RX ORDER — HEPARIN SODIUM 1000 [USP'U]/ML
4000 INJECTION, SOLUTION INTRAVENOUS; SUBCUTANEOUS
Status: DISCONTINUED | OUTPATIENT
Start: 2022-08-04 | End: 2022-08-06

## 2022-08-04 RX ORDER — CEFAZOLIN SODIUM 1 G/50ML
1000 SOLUTION INTRAVENOUS
Status: COMPLETED | OUTPATIENT
Start: 2022-08-05 | End: 2022-08-06

## 2022-08-04 RX ORDER — INSULIN GLARGINE 100 [IU]/ML
40 INJECTION, SOLUTION SUBCUTANEOUS
Status: DISCONTINUED | OUTPATIENT
Start: 2022-08-04 | End: 2022-08-04

## 2022-08-04 RX ORDER — SODIUM CHLORIDE 9 MG/ML
100 INJECTION, SOLUTION INTRAVENOUS CONTINUOUS
Status: DISCONTINUED | OUTPATIENT
Start: 2022-08-05 | End: 2022-08-04

## 2022-08-04 RX ORDER — INSULIN GLARGINE 100 [IU]/ML
30 INJECTION, SOLUTION SUBCUTANEOUS
Status: DISCONTINUED | OUTPATIENT
Start: 2022-08-04 | End: 2022-08-05

## 2022-08-04 RX ADMIN — INSULIN LISPRO 2 UNITS: 100 INJECTION, SOLUTION INTRAVENOUS; SUBCUTANEOUS at 12:58

## 2022-08-04 RX ADMIN — INSULIN LISPRO 20 UNITS: 100 INJECTION, SOLUTION INTRAVENOUS; SUBCUTANEOUS at 09:32

## 2022-08-04 RX ADMIN — METOPROLOL SUCCINATE 50 MG: 50 TABLET, EXTENDED RELEASE ORAL at 17:44

## 2022-08-04 RX ADMIN — INSULIN GLARGINE 30 UNITS: 100 INJECTION, SOLUTION SUBCUTANEOUS at 21:57

## 2022-08-04 RX ADMIN — INSULIN LISPRO 20 UNITS: 100 INJECTION, SOLUTION INTRAVENOUS; SUBCUTANEOUS at 17:53

## 2022-08-04 RX ADMIN — METOPROLOL SUCCINATE 50 MG: 50 TABLET, EXTENDED RELEASE ORAL at 06:13

## 2022-08-04 RX ADMIN — PREGABALIN 75 MG: 75 CAPSULE ORAL at 09:25

## 2022-08-04 RX ADMIN — DIAZEPAM 5 MG: 5 TABLET ORAL at 22:01

## 2022-08-04 RX ADMIN — INSULIN LISPRO 20 UNITS: 100 INJECTION, SOLUTION INTRAVENOUS; SUBCUTANEOUS at 12:58

## 2022-08-04 RX ADMIN — LEVALBUTEROL HYDROCHLORIDE 1.25 MG: 1.25 SOLUTION, CONCENTRATE RESPIRATORY (INHALATION) at 20:29

## 2022-08-04 RX ADMIN — ATORVASTATIN CALCIUM 40 MG: 40 TABLET, FILM COATED ORAL at 17:44

## 2022-08-04 RX ADMIN — ACETAMINOPHEN 975 MG: 325 TABLET ORAL at 15:19

## 2022-08-04 RX ADMIN — LEVALBUTEROL HYDROCHLORIDE 1.25 MG: 1.25 SOLUTION, CONCENTRATE RESPIRATORY (INHALATION) at 07:09

## 2022-08-04 RX ADMIN — ESCITALOPRAM OXALATE 10 MG: 10 TABLET ORAL at 09:25

## 2022-08-04 RX ADMIN — IPRATROPIUM BROMIDE 0.5 MG: 0.5 SOLUTION RESPIRATORY (INHALATION) at 20:29

## 2022-08-04 RX ADMIN — AMIODARONE HYDROCHLORIDE 100 MG: 200 TABLET ORAL at 09:30

## 2022-08-04 RX ADMIN — HYDROMORPHONE HYDROCHLORIDE 2 MG: 2 TABLET ORAL at 10:50

## 2022-08-04 RX ADMIN — SPIRONOLACTONE 100 MG: 50 TABLET ORAL at 09:26

## 2022-08-04 RX ADMIN — FERROUS SULFATE TAB 325 MG (65 MG ELEMENTAL FE) 325 MG: 325 (65 FE) TAB at 09:31

## 2022-08-04 RX ADMIN — BUMETANIDE 2 MG: 2 TABLET ORAL at 09:25

## 2022-08-04 RX ADMIN — IPRATROPIUM BROMIDE 0.5 MG: 0.5 SOLUTION RESPIRATORY (INHALATION) at 13:14

## 2022-08-04 RX ADMIN — LIDOCAINE 5% 2 PATCH: 700 PATCH TOPICAL at 09:26

## 2022-08-04 RX ADMIN — BUMETANIDE 2 MG: 2 TABLET ORAL at 17:44

## 2022-08-04 RX ADMIN — VANCOMYCIN HYDROCHLORIDE 1250 MG: 10 INJECTION, POWDER, LYOPHILIZED, FOR SOLUTION INTRAVENOUS at 15:19

## 2022-08-04 RX ADMIN — DIAZEPAM 5 MG: 5 TABLET ORAL at 15:19

## 2022-08-04 RX ADMIN — LEVALBUTEROL HYDROCHLORIDE 1.25 MG: 1.25 SOLUTION, CONCENTRATE RESPIRATORY (INHALATION) at 13:14

## 2022-08-04 RX ADMIN — IPRATROPIUM BROMIDE 0.5 MG: 0.5 SOLUTION RESPIRATORY (INHALATION) at 07:09

## 2022-08-04 RX ADMIN — PREGABALIN 25 MG: 25 CAPSULE ORAL at 17:44

## 2022-08-04 RX ADMIN — PANTOPRAZOLE SODIUM 40 MG: 40 TABLET, DELAYED RELEASE ORAL at 06:13

## 2022-08-04 RX ADMIN — SENNOSIDES AND DOCUSATE SODIUM 1 TABLET: 50; 8.6 TABLET ORAL at 09:25

## 2022-08-04 RX ADMIN — HEPARIN SODIUM 12 UNITS/KG/HR: 10000 INJECTION, SOLUTION INTRAVENOUS at 17:45

## 2022-08-04 NOTE — PROGRESS NOTES
[facility]  Progress Note - Candi Marrufo 1965, 65 y o  female MRN: [id number]  Unit/Bed#: [facility] 429-01 Encounter: [id number]  Primary Care Provider: Ana Maria Andrade MD   Date and time admitted to hospital: 8/2/2022  8:31 PM    * Empyema lung, Right  Assessment & Plan  · Loculated right pleural effusion  S/P right-sided chest tube insertion and removal on 07/24  · CT of the chest on 7/28/22 revealed moderate partially loculated right pleural effusion  · Right chest tube re-placed on 7/29/22 by IR  · Pleural fluid analysis shows neutrophil predominant WBC count  · Fluid cultures from prior anterior chest tube site positive for MRSA  · 8/1 - CXR: Small component of right pleural effusion suspected which may be partially loculated  Indwelling right thoracostomy tube without pneumothorax  · 8/2 CT Chest:  Decreased size of a right-sided pleural effusion and improved aeration of the right lower lobe post placement of a right pleural drainage catheter, with unchanged appearance of a loculated component of the effusion superomedially  There is slightly  increased density of the effusion compatible with a small amount of blood products  2   Appearance of new inflammatory groundglass opacities in the left lower lobe  3   Mild background interstitial edema is unchanged  Plan:  - Continue Abx w/ Vanc given MRSA, Cefepime d/c'd - Vanc will need to be continued through 8/16 per ID  - Continue Chest tube management per Pulmonology, reconsulted  - Cardiothoracic team consulted for VATS procedure, follow up recs  - ID and pharmacy also reconsulted    - Monitor fever curve and white count closely    Acute on chronic heart failure with preserved ejection fraction [facility])  Assessment & Plan  Wt Readings from Last 3 Encounters:   08/04/22 77 6 kg (171 lb 1 2 oz)   08/02/22 77 7 kg (171 lb 6 4 oz)   07/19/22 78 2 kg (172 lb 6 4 oz)     · Initially admitted to [facility] on 07/15 with CHF exacerbation, now optimized  · Most recent EF 50% with normal systolic and diastolic function on 3/94/0949  Currently stable and euvolemic  · On Bumex 2 mg BID with Aldactone 100 mg daily - restarted on 7/29  · Continue low-salt diet, strict I's and O's monitoring  · Per Cardiology at [facility] upon discharge  · Will need follow-up with General Cardiology & EP service upon discharge for ICD consideration    Chest wall wound infection  Assessment & Plan  · Purulent discharge noted at site of recent chest tube placement  · Cultures positive for MRSA on 07/28 susceptible to vanc  · Currently on IV vancomycin - will continue  · Continue local wound care/dressing changes    Pain at Chest tube insertion site  Assessment & Plan  · Continues with intractable pain at prior chest tube site on right anterior chest wall  This was removed 7/24/22  · Was followed by APS back in [facility], S/P epidural catheter which was removed 8/2  · Current pain regimen: Diaz Tylenol 975 Q8h, PO Dilaudid 2/4 for mod/severe, IV Dilaudid 0 5 mg Q2  · Bowel regimen on board:  Scheduled senna and MiraLax daily  · Plan to discontinue IV Dilaudid when Chest tube removed  Acute Respiratory insufficiency on chronic hypoxic respiratory failure  Assessment & Plan  · She is status post trach  Currently on 6-8L with sats in the high 90s, at home uses 10 L  · Status post recent right pneumothorax requiring chest tube and now with right empyema  · Continue aggressive respiratory protocol, p r n  suctioning  · Continue Xoponex and Atrovent per Pulm q6hrs  · Encourage out of bed, incentive spirometry  · Pulmonology following     CKD (chronic kidney disease) stage 3, GFR 30-59 ml/min ([facility])  Assessment & Plan  · MODESTO noted on initial admission to [facility] with elevated Cr 1 64, now resolved  · Baseline Cr being 0 9-1 0  · Continue to monitor closely   Losartan on hold  · Avoid nephrotoxic agents    Anemia  Assessment & Plan    Results from last 7 days   Lab Units 08/04/22  0454 08/03/22  0559 08/02/22  5193 08/01/22 2026 08/01/22  0457 07/31/22  0443 07/30/22  0459   HEMOGLOBIN g/dL 6 8* 7 9* 8 2* 7 7* 7 8* 8 4* 7 6*   HEMATOCRIT % 23 5* 27 4* 27 4* 25 7* 27 8* 28 1* 25 0*     Most likely anemia of chronic disease due to prolonged illness  No active bleeding noted  Hg (8/4): 6 8 s/p 1 I unit PRBC's   Hb stable, monitor closely  Consider transfusing if < 8 due to history of CAD  Continue Iron replacement as well as daily miralax    Tracheostomy in place [facility])  Assessment & Plan  · Trach placed in the context of cardiac arrest/prolonged ventilator dependent state November 2021  · Decannulated at [facility] 12/11/21  · Patient requires frequent tracheostomy changes and suctioning  · Per discussion with speech therapy,  video barium swallow was done for sense of food getting stuck  Appropriate for regular diet  · On trach collar O2 with humidification and tolerating well btw 6-8L    CAD (coronary artery disease)  Assessment & Plan  · STEMI 10/22/2021 s/p PATRICA mid circumflex OM1  OM2 was ballooned but not stented    · Pulseless VT 10/27/21 - repeat LHC 90% mid circumflex stenosis, but could not be intervened on  · VT 10/28/21 LHC:  found to have apical LAD complete occlusion was felt to be small to be intervened    · Cardiac carrest 1/1/22 - NO cardiac cath [facility] felt to be hypoxic vs  VT induced  · On metoprolol 50mg BID, Brilinta 90 mg BID and Lipitor 40 mg qd    A-fib [facility])  Assessment & Plan  Follows closely with Cardiology  Rhythm controlled with amiodarone 100 mg daily and AC with Eliquis 5 mg BID    Type 2 diabetes mellitus with hyperglycemia [facility])  Assessment & Plan  Lab Results   Component Value Date    HGBA1C 12 7 (H) 05/22/2022       Recent Labs     08/03/22  1541 08/03/22  1836 08/03/22  2045 08/04/22  0607   POCGLU 255* 260* 180* 148*     Uncontrolled per most recent A1c  Home regimen: Lantus 16U QHS and Novolog 4U TID  Current inpatient regimen while at [facility]:  Lantus 45U qHS + Humalog 20U TID + SSI 4  Will continue for now and adjust as needed to maintain  - 180    Hypertension  Assessment & Plan  BP stable  On home losartan 50 mg BID, follows closely with Cardiology  Losartan on hold but consider restarting as MODESTO has now resolved  Monitor BP closely    Hyperlipidemia associated with type 2 diabetes mellitus (HCC)  Assessment & Plan  - Continue Atorvastatin 40 mg daily  Diabetic peripheral neuropathy (HCC)  Assessment & Plan  Continue PTA Lyrica as documented    Anxiety  Assessment & Plan  On Lexapro 10 mg daily as well as Atarax prn            Subjective/Objective     Subjective:   Patient seen and examined at bedside  No overnight events  She complains of pain on the right side at chest tube insertion site  She denies any complains of chest pain, headache, dizziness, palpitation  She states she was able to tolerate a diet with no issues  Objective:  Vitals: Blood pressure 110/63, pulse 64, temperature 97 6 °F (36 4 °C), resp  rate 17, weight 77 6 kg (171 lb 1 2 oz), SpO2 99 %  ,Body mass index is 25 26 kg/m²  Intake/Output Summary (Last 24 hours) at 8/4/2022 0940  Last data filed at 8/4/2022 0601  Gross per 24 hour   Intake 600 ml   Output 1720 ml   Net -1120 ml       Invasive Devices  Report    Peripheral Intravenous Line  Duration           Peripheral IV 08/01/22 Left Antecubital 2 days    Peripheral IV 08/04/22 Proximal;Right;Ventral (anterior) Antecubital <1 day          Epidural Line  Duration           Nerve Block Catheter 07/29/22 5 days          Drain  Duration           Chest Tube 1 Right Posterior 12 Fr  5 days          Airway  Duration           Surgical Airway Shiley Fenestrated 155 days                Physical Exam  Vitals and nursing note reviewed  Constitutional:       General: She is not in acute distress  Appearance: She is well-developed  She is ill-appearing   She is not toxic-appearing or diaphoretic  HENT:      Head: Normocephalic and atraumatic  Eyes:      Conjunctiva/sclera: Conjunctivae normal    Cardiovascular:      Rate and Rhythm: Normal rate and regular rhythm  Heart sounds: No murmur heard  Comments: Right sided chest tube in place  Area C/D/I  Pulmonary:      Effort: Pulmonary effort is normal  No respiratory distress  Breath sounds: Normal breath sounds  Comments: + trach collar in place  Abdominal:      General: Bowel sounds are normal  There is no distension  Palpations: Abdomen is soft  Tenderness: There is no abdominal tenderness  Musculoskeletal:      Cervical back: Neck supple  Right lower leg: No edema  Left lower leg: No edema  Skin:     General: Skin is warm and dry  Neurological:      Mental Status: She is alert  Mental status is at baseline  Psychiatric:         Mood and Affect: Mood normal          Behavior: Behavior normal          Thought Content: Thought content normal          Judgment: Judgment normal          Lab, Imaging and other studies: I have personally reviewed pertinent reports         VTE Pharmacologic Prophylaxis:  Eliquis     VTE Mechanical Prophylaxis: sequential compression device

## 2022-08-04 NOTE — PLAN OF CARE
Problem: RESPIRATORY - ADULT  Goal: Achieves optimal ventilation and oxygenation  Description: INTERVENTIONS:  - Assess for changes in respiratory status  - Assess for changes in mentation and behavior  - Position to facilitate oxygenation and minimize respiratory effort  - Oxygen administered by appropriate delivery if ordered  - Initiate smoking cessation education as indicated  - Encourage broncho-pulmonary hygiene including cough, deep breathe, Incentive Spirometry  - Assess the need for suctioning and aspirate as needed  - Assess and instruct to report SOB or any respiratory difficulty  - Respiratory Therapy support as indicated  Outcome: Progressing     Problem: Prexisting or High Potential for Compromised Skin Integrity  Goal: Skin integrity is maintained or improved  Description: INTERVENTIONS:  - Identify patients at risk for skin breakdown  - Assess and monitor skin integrity  - Assess and monitor nutrition and hydration status  - Monitor labs   - Assess for incontinence   - Turn and reposition patient  - Assist with mobility/ambulation  - Relieve pressure over bony prominences  - Avoid friction and shearing  - Provide appropriate hygiene as needed including keeping skin clean and dry  - Evaluate need for skin moisturizer/barrier cream  - Collaborate with interdisciplinary team   - Patient/family teaching  - Consider wound care consult   Outcome: Progressing

## 2022-08-04 NOTE — OCCUPATIONAL THERAPY NOTE
Occupational Therapy Cancellation Note        Patient Name: Santa SHETH'S Date: 8/4/2022 08/04/22 0743   OT Last Visit   OT Visit Date 08/04/22   Note Type   Note type Cancelled Session   Cancel Reasons Other       OT orders received, chart reviewed  Noted that pt is pending thoracic surgery intervention 8/5  OT will sign off at this time, please re-consult post-op as appropriate  Thank you       VJ Nichols, OTR/L

## 2022-08-04 NOTE — PHYSICAL THERAPY NOTE
Physical Therapy Cancellation Note    Patient's Name: Steff Canela       08/04/22 4155   PT Last Visit   PT Visit Date 08/04/22   Note Type   Note type Evaluation; Cancelled Session   Cancel Reasons Other       Orders received, chart reviewed  Pt admit with right lung empyema  Noted that pt is pending thoracic surgery intervention 8/5  Will hold PT at this time and follow for evaluation/treatment as medically appropriate  Thank you       Andrzej Medel, PT, DPT

## 2022-08-04 NOTE — PROGRESS NOTES
Vancomycin IV Pharmacy-to-Dose Consultation    Sylvia Ugarte is a 65 y o  female who is currently receiving Vancomycin IV with management by the Pharmacy Consult service for the treatment of pneumonia  Assessment/Plan:    The patient's chart was reviewed  Renal function is unstable  Scr increasing, 1 14  on 8/2 to 1 61 on 8/4  Based on today's assessment and trough value of 17 5, will continue current vancomycin (Day # 5) dosing of 1250mg IV Q24h, but due to unstable renal function will draw another trough on 8/5 at 1330  We will continue to follow the patient's culture results and clinical progress daily        Rebecca Raphael, Pharmacist

## 2022-08-04 NOTE — PROGRESS NOTES
Progress Note - Infectious Disease   Kelli Red 65 y o  female MRN: [id number]  Unit/Bed#: [facility] 429-01 Encounter: [id number]      Impression/Recommendations:  1  Probable right-sided empyema   CT show loculated right pleural effusion   Patient is status post placement of chest tube on 07/29, with pleural fluid parameters consistent with empyema   Culture has no growth, likely due to prior antibiotic   Given MRSA in right chest wound, MRSA is likely pathogen in empyema  Despite chest tube drainage, repeat chest CT on 08/02 showed persistent and unchanged loculated empyema  Thoracic surgery evaluation noted, with plan for VATS/decortication in progress  Continue IV vancomycin  Monitor temperature/WBC  Continue chest tube drainage per Pulmonary/IR  Thoracic surgery's plan for VATS/decortication noted      2  Developing sepsis, with leukocytosis and tachypnea, likely secondary to empyema above  Patient is systemically improved  WBC has normalized  Tachypnea resolved  Damian Roth Antibiotic plan as in below  Monitor temperature/WBC  Monitor hemodynamics      3  MRSA right chest wall infection, as site recent/prior chest tube placement for spontaneous pneumothorax  Antibiotic plan as in above  Serial exams      4  Acute on chronic hypoxic respiratory failure, likely multifactorial   Respiratory culture with MRSA and Pseudomonas but no obvious pneumonia   These isolates are most likely airway colonization  Management per primary service      5  Recent spontaneous pneumothorax, status post chest tube placement on 07/20   This was removed on 07/24      6  DM, type 2, poorly controlled, with hyperglycemia and elevated hemoglobin A1c   This is risk factor for infection above  Management per primary service      7  MODESTO   Creatinine has been improving, although rising for the last 2 days  Antibiotic dosages adjusted accordingly    Monitor creatinine      Discussed with patient in detail regarding the above plan      Antibiotics:  Vancomycin # 9     Subjective:  Patient with stable chest wall pain, as chest tube site  Temperature stays down  No chills  She is tolerating antibiotic well  No nausea, vomiting or diarrhea  Objective:  Vitals:  Temp:  [97 6 °F (36 4 °C)-100 8 °F (38 2 °C)] 98 7 °F (37 1 °C)  HR:  [62-85] 66  Resp:  [14-18] 14  BP: (105-132)/(55-77) 119/68  SpO2:  [95 %-100 %] 95 %  Temp (24hrs), Av 2 °F (37 3 °C), Min:97 6 °F (36 4 °C), Max:100 8 °F (38 2 °C)  Current: Temperature: 98 7 °F (37 1 °C)    Physical Exam:     General: Awake, alert, cooperative, no distress  Neck:  Supple  No mass  No lymphadenopathy  Lungs: Decreased breath sounds on right, stable right basilar rhonchi and rales, no wheezing, respirations unlabored  Heart:  Regular rate and rhythm, S1 and S2 normal, no murmur  Abdomen: Soft, nondistended, non-tender, bowel sounds active all four quadrants, no masses, no organomegaly  Extremities: No edema  No erythema/warmth  No ulcer  Nontender to palpation  Skin:  No rash  Neuro: Moves all extremities  Invasive Devices  Report    Peripheral Intravenous Line  Duration           Peripheral IV 22 Left Antecubital 2 days    Peripheral IV 22 Proximal;Right;Ventral (anterior) Antecubital <1 day          Epidural Line  Duration           Nerve Block Catheter 22 6 days          Drain  Duration           Chest Tube 1 Right Posterior 12 Fr  5 days          Airway  Duration           Surgical Airway Shiley Fenestrated 155 days                Labs studies:   I have personally reviewed pertinent labs    Results from last 7 days   Lab Units 22  0454 22  0559 22  0634 22  0459 22  0447   POTASSIUM mmol/L 4 2 3 7 3 8   < > 4 3   CHLORIDE mmol/L 99 101 98   < > 96   CO2 mmol/L 26 26 27   < > 26   BUN mg/dL 42* 39* 35*   < > 47*   CREATININE mg/dL 1 61* 1 34* 1 14   < > 1 33*   EGFR ml/min/1 73sq m 35 44 53   < > 44   CALCIUM mg/dL 8 2* 8 9 8 5   < > 8 5   AST U/L  --  33 16  --  9*   ALT U/L  --  18 7  --  6*   ALK PHOS U/L  --  81 75  --  91    < > = values in this interval not displayed  Results from last 7 days   Lab Units 08/04/22  0454 08/03/22  0559 08/02/22  0634   WBC Thousand/uL 7 56 9 84 14 29*   HEMOGLOBIN g/dL 6 8* 7 9* 8 2*   PLATELETS Thousands/uL 535* 611* 625*     Results from last 7 days   Lab Units 07/29/22  1529   GRAM STAIN RESULT  1+ Polys  No organisms seen   BODY FLUID CULTURE, STERILE  No growth       Imaging Studies:   I have personally reviewed pertinent imaging study reports and images in PACS  EKG, Pathology, and Other Studies:   I have personally reviewed pertinent reports

## 2022-08-04 NOTE — PROGRESS NOTES
Progress Note - Thoracic Surgery   Lara Calvo 65 y o  female MRN: [id number]  Unit/Bed#: [facility] 429-01 Encounter: [id number]    Assessment:  Patient is a 65 y o  female who presented with R empyema, s/p R CT placement on 7/29 and TPA at outside hospital, transferred to [facility] for operative management by thoracic surgery      Febrile to 100 8 yesterday at 430pm, 5L NC with sats in the 90s%  R CT (WS,-AL): 20 cc, serosanguinous  CT site clean, dry and intact    Plan:  Diet as tolerated  Maintain CT to [employer] and eliquis for planned operative intervention on Friday  Appreciate cardiology input  F/u anesthesia pre-op evaluatoin  Continue abx  Rest of care per primary  DVT px  Aggressive pulmonary toilet  Encourage out of bed and ambulation  Encourage IS use 10x per hour while awake    Subjective/Objective   Subjective:   No acute events overnight    Objective:     Blood pressure 110/63, pulse 64, temperature 97 6 °F (36 4 °C), resp  rate 17, weight 77 6 kg (171 lb 1 2 oz), SpO2 99 %  ,Body mass index is 25 26 kg/m²        Intake/Output Summary (Last 24 hours) at 8/4/2022 0956  Last data filed at 8/4/2022 0601  Gross per 24 hour   Intake 600 ml   Output 1720 ml   Net -1120 ml       Invasive Devices  Report    Peripheral Intravenous Line  Duration           Peripheral IV 08/01/22 Left Antecubital 2 days    Peripheral IV 08/04/22 Proximal;Right;Ventral (anterior) Antecubital <1 day          Epidural Line  Duration           Nerve Block Catheter 07/29/22 5 days          Drain  Duration           Chest Tube 1 Right Posterior 12 Fr  5 days          Airway  Duration           Surgical Airway Shiley Fenestrated 155 days                Physical Exam:   General: NAD  Skin: Warm, dry, anicteric  HEENT: Normocephalic, atraumatic  CV: RRR, no m/r/g  Pulm: CTA b/l, no inc WOB, CT as above  Abd: Soft, ND/NT  MSK: Symmetric, no edema, no tenderness, no deformity  Neuro: AOx3, GCS 15    Lab, Imaging and other studies:  I have personally reviewed pertinent lab results    , CBC:   Lab Results   Component Value Date    WBC 7 56 08/04/2022    HGB 6 8 (LL) 08/04/2022    HCT 23 5 (L) 08/04/2022    MCV 78 (L) 08/04/2022     (H) 08/04/2022    MCH 22 4 (L) 08/04/2022    MCHC 28 9 (L) 08/04/2022    RDW 17 8 (H) 08/04/2022    MPV 10 0 08/04/2022    NRBC 0 08/04/2022   , CMP:   Lab Results   Component Value Date    SODIUM 133 (L) 08/04/2022    K 4 2 08/04/2022    CL 99 08/04/2022    CO2 26 08/04/2022    BUN 42 (H) 08/04/2022    CREATININE 1 61 (H) 08/04/2022    CALCIUM 8 2 (L) 08/04/2022    EGFR 35 08/04/2022     VTE Pharmacologic Prophylaxis: Sequential compression device (Venodyne)   VTE Mechanical Prophylaxis: sequential compression device

## 2022-08-04 NOTE — CONSULTS
PHYSICAL MEDICINE AND REHABILITATION CONSULT NOTE  Caty Barber 65 y o  female MRN: [id number]  [facility] Encounter: [id number]    Requested by:   Rohit Varner DO  Reason for Consultation:  Rehabilitation medicine evaluation and assistance with appropriate disposition  Primary insurance listed on facesheet:  [facility]    Assessment and Recommendations:  Caty Babrer is a 65 y o  female with a past medical history of tobacco abuse, severe COPD/emphysema, diabetes, hypertension, hyperlipidemia, ascending aortic aneurysm, anxiety, depression, migraines, fibromyalgia, atrial fibrillation, chronic systolic CHF coronary artery disease, STEMI, angioplasty and stenting complicated by cardiogenic, V Tach, cardiac arrest aspiration pneumonia prolonged respiratory failure requiring tracheostomy prolonged encephalopathy and altered mental status found to bilateral cerebral hemisphere infarcts believed to be secondary to hypotensive episodes and cardiac arrest with prolonged hospitalization in late 2021 discharged to [address] [place] [address]  Patient was decannulated on 12/11/2021  Patient was admitted to [facility] from nursing home after being in cardiac arrest requiring IUD shocking on 1/1/2022  Patient discharged with light with plan for future ICD placement  Patient infected lung bullae and was placed on IV antibiotics  ENT noted subglottic stenosis and underwent revision tracheostomy  Patient was admitted to inpatient rehabilitation facility [facility] and discharged on 02/10/2022  Patient was able to be off life vest during inpatient rehab as she is on continuous telemetry but was initiated at discharge  She was noted to some right hand and wrist pain believed to be arthritis and possible carpal tunnel        Functional status at time of discharge on 2/10/22 largely supervision for ADLs and supervision to standby assistance for transfers and ambulation as well as Min assist for elevations  Patient presents hospital ED for 3 visits in February for brief acute shortness of breath episodes but was able to be discharged without being admitted  She had 7 day hospitalization through March 3, 2022 for acute chronic respiratory failure with hypoxia due to CHF  Patient was readmitted March 25-28, 2022 for acute on chronic respiratory failure and combined systolic and diastolic CHF  Patient had a few additional ED presentations without admission May-Jun 2022 followed by 2 day admission 6/27-29th for acute SOB possibly 2/2 viral infection and diuretic non-compliance  She was home for a little over 2 weeks and has now been hospitalized since 7/15/22  Initially at [facility] then [facility] and now transferred to [facility]  Patient developed worsening SOB and CP found to have spontaneous PTX and acute on chronic CHF  Early course also notable for MODESTO, 1 episode of blood tinged secretions  She was noted to have R lung empyema and chest tube was placed 7/29 and she was placed on IV abx  Patient noted to have chest wall wound infection with MRSA with [facility] as well as Abx  She was having intractable chest wall pain aw well  Patient had required frequent trach changes and suctions  ID/Thoracic sx following and plan for VATS/decortication Saturday       Anemia has been worsening with Hb 6 8 on 8/4 with 1 U PRBC  Patient evaluated by wound care for stage 2 sacral pressure injury with recommendation of barrier cream and appropriate offloading  Prior Level of Function and Social history:    Patient lives in multi-level home with Son and son's GF with 4-5 FELTON with rails and 1/2 bathroom that she can stay on  She was staying on 2nd floor with additional flight of stairs and full bath/bed    - Per patient/family - patient was mod indep with ADLs and mobility with walker; able to do stairs as well   - Son would suction her 1-2 times per day and help with iADLs and transport  Functional status at time of discharge from [facility] on 2/10/22 largely supervision for ADLs and supervision to standby assistance for transfers and ambulation as well as Min assist for elevations  Current Level of Function:    Pending assessment by PT/OT    Decline in ADLs and mobility:  Multifactorial:  Acute Lung empyema, MRSA chest wall infection, chronic respiratory failure, acute on CKD, pain   - Optimal management of each as listed    - Await PT, OT initial evaluation to better assess functional status and deposition appropriateness   - Then continue PT, OT while in acute setting to improve functional mobility, transfers, upper and lower body strengthening, conditioning, balance, and gait training with appropriate assistive device  - Continue SLP for dysphagia  - While patient at current functional level would also focus on prevention or improvement of complications (malnutrition, dehydration, PNA, atelectasis, VTE, constipation/obstruction, urinary retention, UTI, ulcerations, contractures)     - Turn patient Q2H, skin checks minimum Qshift  - ROM of major joints 2-3 times per day  - OOB to chair or reclined chair or adjust bed to seated position 2-3 times per day  - VTE prophylaxis per primary team     Disposition recommendation:     TBD pending functional evals after thoracic sx =     Overall Medical Status:  - Vitals: stable, patient saturating well on 5L blow by trach mask (on 10L at home)  - Recent labs:  Hb down to 6 8 on 8/4, acute on CKI - Cr somewhat up from recent baseline  - Recent imaging:  see elsewhere  - Continue to monitor vitals, labs, exam closely   - Additional Work-up/management prior to potential transfer to rehab/discharge:    VATS 8/6/2022   Improved anemia and acute on chronic kidney injury     Hydration -   - ensure adequate hydration but avoid fluid overload     Nutrition -   - ensure optimal intake     Bladder function -   - monitor for retention, incontinence, signs/symptoms of UTI     Bowel function -   - Bowel regimen per primary team   - PRN bowel regimen     Encounter for skin care - stage 2 pressure injury  - Calazime TID and PRN  - Consider p500 bed - definitely start if wound not improving or worsening > monitor closely - discussed with primary team   - Close wound care mgmt   - Frequent turning, Q2H, EHOB cushion when OOB in chair  - Consider/provide hydragard BID for buttocks, sacrum, and heels  - Ensure optimal bowel/bladder hygiene   - Monitor closely       VTE prophylaxis   - SCDs - discussed with nursing   - As outlined by primary team      # Other medical issues:     Per primary service        ==================================================================    Chief Complaint:  Chest wall discomfort     History of Present Illness:   Benjamín Mejias is a 65 y o  female with a past medical history of tobacco abuse, severe COPD/emphysema, diabetes, hypertension, hyperlipidemia, ascending aortic aneurysm, anxiety, depression, migraines, fibromyalgia, atrial fibrillation, chronic systolic CHF coronary artery disease, STEMI, angioplasty and stenting complicated by cardiogenic, V Tach, cardiac arrest aspiration pneumonia prolonged respiratory failure requiring tracheostomy prolonged encephalopathy and altered mental status found to bilateral cerebral hemisphere infarcts believed to be secondary to hypotensive episodes and cardiac arrest with prolonged hospitalization in late 2021 discharged to [address] [place] [address]  Patient was decannulated on 12/11/2021  Patient was admitted to [facility] from nursing home after being in cardiac arrest requiring IUD shocking on 1/1/2022  Patient discharged with light with plan for future ICD placement  Patient infected lung bullae and was placed on IV antibiotics  ENT noted subglottic stenosis and underwent revision tracheostomy  Patient was admitted to inpatient rehabilitation facility [facility] and discharged on 02/10/2022    Patient was able to be off life vest during inpatient rehab as she is on continuous telemetry but was initiated at discharge  She was noted to some right hand and wrist pain believed to be arthritis and possible carpal tunnel  Functional status at time of discharge on 2/10/22 largely supervision for ADLs and supervision to standby assistance for transfers and ambulation as well as Min assist for elevations  Patient presents hospital ED for 3 visits in February for brief acute shortness of breath episodes but was able to be discharged without being admitted  She had 7 day hospitalization through March 3, 2022 for acute chronic respiratory failure with hypoxia due to CHF  Patient was readmitted March 25-28, 2022 for acute on chronic respiratory failure and combined systolic and diastolic CHF  Patient had a few additional ED presentations without admission May-Jun 2022 followed by 2 day admission 6/27-29th for acute SOB possibly 2/2 viral infection and diuretic non-compliance  She was home for a little over 2 weeks and has now been hospitalized since 7/15/22  Initially at [facility] then [facility] and now transferred to [facility]  Patient developed worsening SOB and CP found to have spontaneous PTX and acute on chronic CHF  Early course also notable for MODESTO, 1 episode of blood tinged secretions  She was noted to have R lung empyema and chest tube was placed 7/29 and she was placed on IV abx  Patient noted to have chest wall wound infection with MRSA with [facility] as well as Abx  She was having intractable chest wall pain aw well  Patient had required frequent trach changes and suctions  ID/Thoracic sx following and plan for VATS/decortication Saturday       Anemia has been worsening with Hb 6 8 on 8/4 with 1 U PRBC  Patient evaluated by wound care for stage 2 sacral pressure injury with recommendation of barrier cream and appropriate offloading  On eval, patient reports some R chest wall pain    She notes some cough somewhat increased recently but not too bad  She denies increased SOB, fever, chills, sweats, worsening strength, sensation  She states she had recently BM and is urinating adequately  Review of Systems:     Complete review of systems obtained  Please see HPI for details with other significant symptoms or history listed here: Otherwise, 14 point review of systems completed and was otherwise unremarkable      Allergies   Allergen Reactions    Metformin Diarrhea    Clonidine Rash     Patch        Current Facility-Administered Medications:     acetaminophen (TYLENOL) tablet 975 mg, 975 mg, Oral, Q8H [address], Rejeana Rolling, DO, 975 mg at 08/03/22 2144    albuterol inhalation solution 2 5 mg, 2 5 mg, Nebulization, Q4H PRN, Rejeana Rolling, DO    albuterol inhalation solution 2 5 mg, 2 5 mg, Nebulization, Q4H PRN, Willma Batters, DO    amiodarone tablet 100 mg, 100 mg, Oral, Daily With Breakfast, Rejeana Rolling, DO, 100 mg at 08/04/22 0930    atorvastatin (LIPITOR) tablet 40 mg, 40 mg, Oral, Daily With Jamar Toure, DO, 40 mg at 08/03/22 1738    benzonatate (TESSALON PERLES) capsule 100 mg, 100 mg, Oral, TID PRN, Rejeana Rolling, DO    bumetanide (BUMEX) tablet 2 mg, 2 mg, Oral, BID, Rejeana Rolling, DO, 2 mg at 08/04/22 0925    [START ON 8/5/2022] ceFAZolin (ANCEF) IVPB (premix in dextrose) 1,000 mg 50 mL, 1,000 mg, Intravenous, On Call To OR, Irene Yi MD    diazepam (VALIUM) tablet 5 mg, 5 mg, Oral, Q6H PRN, Rejeana Rolling, DO, 5 mg at 08/03/22 2146    escitalopram (LEXAPRO) tablet 10 mg, 10 mg, Oral, Daily, Rejeana Rolling, DO, 10 mg at 08/04/22 2155    ferrous sulfate tablet 325 mg, 325 mg, Oral, Daily With Breakfast, Rejeana Rolling, DO, 325 mg at 08/04/22 0931    HYDROmorphone (DILAUDID) injection 0 5 mg, 0 5 mg, Intravenous, Q2H PRN, Rejeana Rolling, DO    HYDROmorphone (DILAUDID) tablet 2 mg, 2 mg, Oral, Q4H PRN, Analy Arevalo DO, 2 mg at 08/03/22 6512    HYDROmorphone (DILAUDID) tablet 4 mg, 4 mg, Oral, Q4H PRN, Jennifer Pagan,     hydrOXYzine HCL (ATARAX) tablet 25 mg, 25 mg, Oral, Q6H PRN, Jennifer Pagan DO    insulin glargine (LANTUS) subcutaneous injection 45 Units 0 45 mL, 45 Units, Subcutaneous, HS, Jennifer Pagan DO, 45 Units at 08/03/22 2146    insulin lispro (HumaLOG) 100 units/mL subcutaneous injection 2-12 Units, 2-12 Units, Subcutaneous, TID AC, 6 Units at 08/03/22 1746 **AND** Fingerstick Glucose (POCT), , , TID AC, Jennifer Pagan, DO    insulin lispro (HumaLOG) 100 units/mL subcutaneous injection 2-12 Units, 2-12 Units, Subcutaneous, HS, Jennifer Pagan, DO, 2 Units at 08/03/22 2147    insulin lispro (HumaLOG) 100 units/mL subcutaneous injection 20 Units, 20 Units, Subcutaneous, TID With Meals, Pritesh Chaidez MD, 20 Units at 08/04/22 0932    ipratropium (ATROVENT) 0 02 % inhalation solution 0 5 mg, 0 5 mg, Nebulization, TID, Iline Bullion, DO, 0 5 mg at 08/04/22 0709    levalbuterol (XOPENEX) inhalation solution 1 25 mg, 1 25 mg, Nebulization, TID, Iline Bullion, DO, 1 25 mg at 08/04/22 0709    lidocaine (LIDODERM) 5 % patch 2 patch, 2 patch, Topical, Daily, Jennifer Pagan DO, 2 patch at 08/04/22 0926    metoprolol succinate (TOPROL-XL) 24 hr tablet 50 mg, 50 mg, Oral, Q12H, Jennifer Pagan DO, 50 mg at 08/04/22 3634    pantoprazole (PROTONIX) EC tablet 40 mg, 40 mg, Oral, Early Morning, Jennifer Pagan DO, 40 mg at 08/04/22 3996    polyethylene glycol (MIRALAX) packet 17 g, 17 g, Oral, Daily, Jennifer Pagan DO    pregabalin (LYRICA) capsule 25 mg, 25 mg, Oral, QPM, Yonatan Jara, DO, 25 mg at 08/03/22 1738    pregabalin (LYRICA) capsule 75 mg, 75 mg, Oral, Daily, Jennifer Pagan DO, 75 mg at 08/04/22 7407    senna-docusate sodium (SENOKOT S) 8 6-50 mg per tablet 1 tablet, 1 tablet, Oral, BID, Jennifer Pagan DO, 1 tablet at 08/04/22 1724    spironolactone (ALDACTONE) tablet 100 mg, 100 mg, Oral, Daily, Jennifer Pagan DO, 100 mg at 08/04/22 9604    trimethobenzamide (TIGAN) IM injection 200 mg, 200 mg, Intramuscular, Q6H PRN, Fayrene Allendale, DO    vancomycin (VANCOCIN) 1,250 mg in sodium chloride 0 9 % 250 mL IVPB, 1,250 mg, Intravenous, Q24H, Abbyyrene Avel, DO, Last Rate: 166 7 mL/hr at 08/03/22 1430, 1,250 mg at 08/03/22 1430    Past Medical History:   Diagnosis Date    Anxiety     Aortic aneurysm (HCC)     Arthritis     Depression     Diabetes mellitus ([facility] [address] )     Fibromyalgia     GERD (gastroesophageal reflux disease)     GERD (gastroesophageal reflux disease)     H/O cardiovascular stress test 09/2018    no ischemia  EF 70%   H/O echocardiogram 01/2019    EF 60%  Mild LVH  trivial effusion   Hyperlipidemia     Hypertension     Migraines     Psychiatric disorder     anxiety    Uncontrolled hypertension 2/25/2015    Last Assessment & Plan:  BP today above goal <140/90, apparently asymptomatic  Prior BP elevations were attributed to not taking medication  Consider increased medication if persistent on f/u  Past Surgical History:   Procedure Laterality Date    BACK SURGERY      Lumbar epidural steroid injection    CARDIAC CATHETERIZATION N/A 10/22/2021    Procedure: Cardiac pci;  Surgeon: Negro Alston MD;  [facility]: [facility] CARD[facility]; Service: Cardiology    CARDIAC CATHETERIZATION  10/22/2021    Procedure: Cardiac catheterization;  Surgeon: eNgro Alston MD;  [facility]: [facility]; Service: Cardiology    CARDIAC CATHETERIZATION Left 10/27/2021    Procedure: Cardiac Left Heart Cath;  Surgeon: Romelle Gaucher, MD;  Location: [facility] CARD[facility]; Service: Cardiology    CARDIAC CATHETERIZATION N/A 10/27/2021    Procedure: Cardiac pci;  Surgeon: Romelle Gaucher, MD;  Location: [facility] CARDI[facility]; Service: Cardiology    CARDIAC CATHETERIZATION N/A 10/28/2021    Procedure: Cardiac pci;  Surgeon: Yarely Estevez DO;  Location: [facility] CARD[facility];   Service: Cardiology    CARPAL TUNNEL RELEASE Left   SECTION      CHOLECYSTECTOMY      COLONOSCOPY      incomplete    COLONOSCOPY      EYE SURGERY      HYSTERECTOMY      Total    IR CHEST TUBE PLACEMENT  2022    IR CHEST TUBE PLACEMENT  2022    IR CHEST TUBE PLACEMENT  2022    OVARIAN CYST REMOVAL      PEG W/TRACHEOSTOMY PLACEMENT N/A 2021    Procedure: TRACHEOSTOMY WITH INSERTION PEG TUBE;  Surgeon: Elina Fung DO;  Location: [facility] MAIN OR;  Service: General    TUBAL LIGATION      UPPER GASTROINTESTINAL ENDOSCOPY        Family History   Problem Relation Age of Onset    Hypertension Mother    Daniel Banks Arthritis Mother     Diabetes Father     Other Sister         renal cell carcinoma    Diabetes Other     Factor V Leiden deficiency Other        Social History available currently in EMR:  (See additional SH as outlined above)   Social History     Socioeconomic History    Marital status: Legally      Spouse name: Not on file    Number of children: Not on file    Years of education: Not on file    Highest education level: Not on file   Occupational History    Not on file   Tobacco Use    Smoking status: Former Smoker     Packs/day: 1 00     Years: 30 00     Pack years: 30 00     Types: Cigarettes    Smokeless tobacco: Never Used   Vaping Use    Vaping Use: Never used   Substance and Sexual Activity    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       Drug use: Yes     Types: Marijuana     Comment: medical; seldom use    Sexual activity: Yes     Birth control/protection: Female Sterilization     Comment: Monogamous relationship with monogamous partner   Other Topics Concern    Not on file   Social History Narrative    Most recent tobacco use screenin2019    Do you currently or have you served in the [employer]: No    Were you activated, into active duty, as a member of the "[employer] or as a Reservist: No    Advance directive: No    Chewing tobacco: none    Alcohol intake: None    Marital status: Single    Live alone or with others: with others    Family history of heart disease:    aortic aneurysm    High cholesterol: Yes    High blood pressure: Yes    Exercise level: Heavy    Overweight: Yes    Obese: Yes    Diabetes: Yes    General stress level: High    Diet: Regular    Caffeine intake: Occasional    Guns present in home: No    Seat belts used routinely: Yes    Sexual orientation: Heterosexual    Sunscreen used routinely: No    Seat belt/car seat used routinely: Yes    Exercise-How often do you exercise: as tolerated    Do you have regular medical check ups: Yes    Do you have regular dental check ups: Yes    Illicit drugs-years of use:    recovery 23 years - HX of     Social Determinants of Health     Financial Resource Strain: Not on file   Food Insecurity: Food Insecurity Present    Worried About Running Out of Food in the Last Year: Sometimes true    Lela of Food in the Last Year: Sometimes true   Transportation Needs: No Transportation Needs    Lack of Transportation (Medical): No    Lack of Transportation (Non-Medical):  No   Physical Activity: Not on file   Stress: Not on file   Social Connections: Not on file   Intimate Partner Violence: Not on file   Housing Stability: Low Risk     Unable to Pay for Housing in the Last Year: No    Number of Places Lived in the Last Year: 1    Unstable Housing in the Last Year: No       Physical Examination:   Temp:  [97 6 °F (36 4 °C)-100 8 °F (38 2 °C)] 97 6 °F (36 4 °C)  HR:  [62-85] 64  Resp:  [17] 17  BP: (105-124)/(55-76) 110/63  FiO2 (%):  [28] 28  General: Awake, alert in NAD, lying in bed  HENT: NCAT, MMM  Neck: Supple, no lymphadenopathy, trach in place   Respiratory: Unlabored breathing, breath sounds equal, Lungs CTA, no wheezes, rales, or rhonchi; R chest tube in place with some serosanginous drainage   Cardiovascular: Regular rate and rhythm, no murmurs, rubs, or gallops  Gastrointestinal: Soft, non-tender, non-distended, normoactive bowel sounds  Genitourinary: No enrique  SkiN/MSK/Extremities:  Distal extremities appropriately warm, normal cap refill distally, no cyanosis or calf edema, no calf tenderness to palpation  Neurologic/Psych:   MENTAL STATUS: awake, oriented to person, place, time, and situation  Affect: Euthymic   CN II-XII: grossly intact   Strength/MMT:  Near full throughout; FTN intact  Heels intact  Sacrum/buttock stage 2 observed in EMR     Data:  Lab Results   Component Value Date    HGB 6 8 (LL) 08/04/2022    HGB 13 3 07/19/2015    HCT 23 5 (L) 08/04/2022    HCT 40 2 07/19/2015    WBC 7 56 08/04/2022    WBC 10 66 (H) 07/19/2015     07/19/2015    K 4 2 08/04/2022    K 3 7 07/19/2015    CL 99 08/04/2022     07/19/2015    GLUCOSE 185 (H) 10/28/2021    GLUCOSE 230 (H) 07/19/2015    CREATININE 1 61 (H) 08/04/2022    CREATININE 0 71 07/19/2015    BUN 42 (H) 08/04/2022    BUN 16 07/19/2015       XR chest portable    Result Date: 8/3/2022  Impression: Improving right midlung field and bibasilar consolidation and effusion  Trace left pleural effusion persists  Stable position of right basilar pleural drainage catheter and tracheostomy  Workstation performed: [id number]     CT chest wo contrast    Result Date: 8/2/2022  Impression: 1  Decreased size of a right-sided pleural effusion and improved aeration of the right lower lobe post placement of a right pleural drainage catheter, with unchanged appearance of a loculated component of the effusion superomedially  There is slightly increased density of the effusion compatible with a small amount of blood products  2   Appearance of new inflammatory groundglass opacities in the left lower lobe  3   Mild background interstitial edema is unchanged  The study was marked in EPIC for significant notification  Workstation performed: [id number]       Pertinent labs and imaging reviewed  Patient seen on date of service listed      Total time spent:  80 minutes (at least) with more than 50% spent counseling/coordinating care  Counseling includes extended discussion with patient (+ Son/relevant historian) re: history, symptoms, medications, functional issues, mood, medical management (which in part specifically includes management of recent lung issues) and rehabilitation management plan, and disposition  Additional time spent with thorough chart review in EMR, reviewing recent medications, labs, imaging, and management plan  This was followed by formulating a comprehensive inpatient medical/rehabilitation management plan and coordinating with pertinent specialists (FM and thoracic sx) as indicated  Thank you for allowing the PM&R service to participate in the care of this patient  We will continue to follow Kelli Red's progress with you  Please do not hesitate to call with questions or concerns      Hood Pan MD, [facility]  Physical Medicine and Rehabilitation  Brain Injury Medicine

## 2022-08-04 NOTE — PROGRESS NOTES
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 baseline  4  HF 50% EF  5  Afib on Eliquis  6  CAD s/p arrest + PCI on Brilinta  7  CKD III  8  T2DM        Plan:  1  Empyema  · Pleural fluid analysis showing predominantly neutrophilic WBC count with cultures positive for MRSA  · Continue with IV vancomycin as per ID given positive MRSA  · Chest tube in place, continue with suction -20  No air leak at this morning  Can consider transition to water seal  S/p tPA DNase x2, pending evaluation for VATS procedure via thoracic surgery vs conservative management, appears to be scheduled for 8/5  · No recorded output overnight, 20mL in last 24 hours  · Oxygen supplementation as necessary  · Improving since consolidation and effusion as of this morning's x-ray  · Monitor fever curve  · Daily CBC to trend WBC  · Despite monomicrobial growth on wound culture, cannot be certain that the same bacteria is the cause of patient's empyema  Can consider addition of anaerobic coverage but will defer to ID  2  Pneumothorax - resolved  · Chest tube in place, management as above  · Pain regimen as per primary, has not appeared to need many opioids for relief  3  Chronic trach dependence on 10L baseline  · Trach placed in context of cardiac arrest/prolonged ventilator dependent state in November 2021, decannulated at [facility] in December 2021  · Passed by speech for regular diet  · Monitor tracheostomy site  4  Anemia  · Agree with unit transfusion of blood  · Monitor response to Hg for signs of acute bleed  5   Rest of management as per primary team      SUBJECTIVE     Patient seen and examined  No acute events overnight  Patient feeling overall the same today  No acute complaints  Pain controlled by current pain regimen  No worsening shortness of breath  OBJECTIVE     Vitals:    22 0244 22 0553 22 0638 22 0718   BP: 105/57 110/65  110/63   Pulse: 64 62  64   Resp:  17     Temp: 98 6 °F (37 °C)   97 6 °F (36 4 °C)   SpO2: 96% 96%  99%   Weight:   77 6 kg (171 lb 1 2 oz)       Temperature:   Temp (24hrs), Av 6 °F (37 6 °C), Min:97 6 °F (36 4 °C), Max:100 8 °F (38 2 °C)    Temperature: 97 6 °F (36 4 °C)  Intake & Output:  I/O        0701  / 0700  0701   0700  0701   0700    P  O  120 960     Total Intake(mL/kg) 120 960 (12 4)     Urine (mL/kg/hr) 625 1700 (0 9)     Stool  0     Chest Tube 0 20     Total Output 625 1720     Net -505 -760            Unmeasured Stool Occurrence  1 x         Weights:        Body mass index is 25 26 kg/m²  Weight (last 2 days)     Date/Time Weight    22 0638 77 6 (171 08)        Physical Exam  Cardiovascular:      Rate and Rhythm: Normal rate and regular rhythm  Pulmonary:      Effort: Pulmonary effort is normal       Breath sounds: Examination of the right-middle field reveals decreased breath sounds  Examination of the right-lower field reveals decreased breath sounds  Decreased breath sounds present  No wheezing or rhonchi  Comments: Trach collar in place  Chest tube in place, no air leak, set to -20 suction  Musculoskeletal:      Right lower leg: No edema  Left lower leg: No edema  Psychiatric:         Mood and Affect: Mood normal          Behavior: Behavior normal        LABORATORY DATA     Labs: I have personally reviewed pertinent reports    Results from last 7 days   Lab Units 22  0454 22  0559 22  0634 22  0457 22  0443 22  0459 22  0447   WBC Thousand/uL 7 56 9 84 14 29*  --  17 46* 16 13* 18 08* 17 26*   HEMOGLOBIN g/dL 6 8* 7  9* 8 2*   < > 7 8* 8 4* 7 6* 8 2*   HEMATOCRIT % 23 5* 27 4* 27 4*   < > 27 8* 28 1* 25 0* 27 5*   PLATELETS Thousands/uL 535* 611* 625*  --  611* 617* 583* 505*   NEUTROS PCT %  --   --   --   --   --  79* 85* 75   MONOS PCT %  --   --   --   --   --  10 8 9   MONO PCT %  --   --  8  --  5  --   --   --     < > = values in this interval not displayed  Results from last 7 days   Lab Units 08/04/22  0454 08/03/22  0559 08/02/22  0634 07/30/22  0459 07/29/22  0447   POTASSIUM mmol/L 4 2 3 7 3 8   < > 4 3   CHLORIDE mmol/L 99 101 98   < > 96   CO2 mmol/L 26 26 27   < > 26   BUN mg/dL 42* 39* 35*   < > 47*   CREATININE mg/dL 1 61* 1 34* 1 14   < > 1 33*   CALCIUM mg/dL 8 2* 8 9 8 5   < > 8 5   ALK PHOS U/L  --  81 75  --  91   ALT U/L  --  18 7  --  6*   AST U/L  --  33 16  --  9*    < > = values in this interval not displayed  ABG:       IMAGING & DIAGNOSTIC TESTING     Radiology Results: I have personally reviewed pertinent reports  XR chest portable    Result Date: 8/3/2022  Impression: Improving right midlung field and bibasilar consolidation and effusion  Trace left pleural effusion persists  Stable position of right basilar pleural drainage catheter and tracheostomy  Workstation performed: [id number]     CT chest wo contrast    Result Date: 8/2/2022  Impression: 1  Decreased size of a right-sided pleural effusion and improved aeration of the right lower lobe post placement of a right pleural drainage catheter, with unchanged appearance of a loculated component of the effusion superomedially  There is slightly increased density of the effusion compatible with a small amount of blood products  2   Appearance of new inflammatory groundglass opacities in the left lower lobe  3   Mild background interstitial edema is unchanged  The study was marked in EPIC for significant notification    Workstation performed: [id number]     Other Diagnostic Testing: I have personally reviewed pertinent reports      ACTIVE MEDICATIONS     Current Facility-Administered Medications   Medication Dose Route Frequency    acetaminophen (TYLENOL) tablet 975 mg  975 mg Oral Q8H [facility]    albuterol inhalation solution 2 5 mg  2 5 mg Nebulization Q4H PRN    albuterol inhalation solution 2 5 mg  2 5 mg Nebulization Q4H PRN    amiodarone tablet 100 mg  100 mg Oral Daily With Breakfast    atorvastatin (LIPITOR) tablet 40 mg  40 mg Oral Daily With Dinner    benzonatate (TESSALON PERLES) capsule 100 mg  100 mg Oral TID PRN    bumetanide (BUMEX) tablet 2 mg  2 mg Oral BID    [START ON 8/5/2022] ceFAZolin (ANCEF) IVPB (premix in dextrose) 1,000 mg 50 mL  1,000 mg Intravenous On Call To OR    diazepam (VALIUM) tablet 5 mg  5 mg Oral Q6H PRN    escitalopram (LEXAPRO) tablet 10 mg  10 mg Oral Daily    ferrous sulfate tablet 325 mg  325 mg Oral Daily With Breakfast    HYDROmorphone (DILAUDID) injection 0 5 mg  0 5 mg Intravenous Q2H PRN    HYDROmorphone (DILAUDID) tablet 2 mg  2 mg Oral Q4H PRN    HYDROmorphone (DILAUDID) tablet 4 mg  4 mg Oral Q4H PRN    hydrOXYzine HCL (ATARAX) tablet 25 mg  25 mg Oral Q6H PRN    insulin glargine (LANTUS) subcutaneous injection 45 Units 0 45 mL  45 Units Subcutaneous HS    insulin lispro (HumaLOG) 100 units/mL subcutaneous injection 2-12 Units  2-12 Units Subcutaneous TID AC    insulin lispro (HumaLOG) 100 units/mL subcutaneous injection 2-12 Units  2-12 Units Subcutaneous HS    insulin lispro (HumaLOG) 100 units/mL subcutaneous injection 20 Units  20 Units Subcutaneous TID With Meals    ipratropium (ATROVENT) 0 02 % inhalation solution 0 5 mg  0 5 mg Nebulization TID    levalbuterol (XOPENEX) inhalation solution 1 25 mg  1 25 mg Nebulization TID    lidocaine (LIDODERM) 5 % patch 2 patch  2 patch Topical Daily    metoprolol succinate (TOPROL-XL) 24 hr tablet 50 mg  50 mg Oral Q12H    pantoprazole (PROTONIX) EC tablet 40 mg  40 mg Oral Early Morning    polyethylene glycol (MIRALAX) packet 17 g  17 g Oral Daily    pregabalin (LYRICA) capsule 25 mg  25 mg Oral QPM    pregabalin (LYRICA) capsule 75 mg  75 mg Oral Daily    senna-docusate sodium (SENOKOT S) 8 6-50 mg per tablet 1 tablet  1 tablet Oral BID    [START ON 8/5/2022] sodium chloride 0 9 % infusion  100 mL/hr Intravenous Continuous    spironolactone (ALDACTONE) tablet 100 mg  100 mg Oral Daily    trimethobenzamide (TIGAN) IM injection 200 mg  200 mg Intramuscular Q6H PRN    vancomycin (VANCOCIN) 1,250 mg in sodium chloride 0 9 % 250 mL IVPB  1,250 mg Intravenous Q24H       VTE Pharmacologic Prophylaxis: Reason for no pharmacologic prophylaxis Eliquis  VTE Mechanical Prophylaxis: sequential compression device      Disclaimer: Portions of the record may have been created with voice recognition software  Occasional wrong word or "sound a like" substitutions may have occurred due to the inherent limitations of voice recognition software  Careful consideration should be taken to recognize, using context, where substitutions have occurred      Holli Almanza DO   Internal Medicine PGY-II   [address] [facility]

## 2022-08-05 LAB
ABO GROUP BLD BPU: NORMAL
ANION GAP SERPL CALCULATED.3IONS-SCNC: 3 MMOL/L (ref 4–13)
APTT PPP: 43 SECONDS (ref 23–37)
APTT PPP: 62 SECONDS (ref 23–37)
APTT PPP: 82 SECONDS (ref 23–37)
BPU ID: NORMAL
BUN SERPL-MCNC: 40 MG/DL (ref 5–25)
CALCIUM SERPL-MCNC: 7.8 MG/DL (ref 8.3–10.1)
CHLORIDE SERPL-SCNC: 102 MMOL/L (ref 96–108)
CO2 SERPL-SCNC: 28 MMOL/L (ref 21–32)
CREAT SERPL-MCNC: 1.49 MG/DL (ref 0.6–1.3)
CROSSMATCH: NORMAL
ERYTHROCYTE [DISTWIDTH] IN BLOOD BY AUTOMATED COUNT: 17.7 % (ref 11.6–15.1)
GFR SERPL CREATININE-BSD FRML MDRD: 38 ML/MIN/1.73SQ M
GLUCOSE SERPL-MCNC: 123 MG/DL (ref 65–140)
GLUCOSE SERPL-MCNC: 157 MG/DL (ref 65–140)
GLUCOSE SERPL-MCNC: 167 MG/DL (ref 65–140)
GLUCOSE SERPL-MCNC: 172 MG/DL (ref 65–140)
GLUCOSE SERPL-MCNC: 261 MG/DL (ref 65–140)
HCT VFR BLD AUTO: 24.2 % (ref 34.8–46.1)
HGB BLD-MCNC: 7.2 G/DL (ref 11.5–15.4)
MCH RBC QN AUTO: 23.5 PG (ref 26.8–34.3)
MCHC RBC AUTO-ENTMCNC: 29.8 G/DL (ref 31.4–37.4)
MCV RBC AUTO: 79 FL (ref 82–98)
PLATELET # BLD AUTO: 499 THOUSANDS/UL (ref 149–390)
PMV BLD AUTO: 9.9 FL (ref 8.9–12.7)
POTASSIUM SERPL-SCNC: 4.7 MMOL/L (ref 3.5–5.3)
RBC # BLD AUTO: 3.07 MILLION/UL (ref 3.81–5.12)
SODIUM SERPL-SCNC: 133 MMOL/L (ref 135–147)
UNIT DISPENSE STATUS: NORMAL
UNIT PRODUCT CODE: NORMAL
UNIT PRODUCT VOLUME: 350 ML
UNIT RH: NORMAL
VANCOMYCIN TROUGH SERPL-MCNC: 18 UG/ML (ref 10–20)
WBC # BLD AUTO: 7.01 THOUSAND/UL (ref 4.31–10.16)

## 2022-08-05 PROCEDURE — 94640 AIRWAY INHALATION TREATMENT: CPT

## 2022-08-05 PROCEDURE — 85730 THROMBOPLASTIN TIME PARTIAL: CPT | Performed by: FAMILY MEDICINE

## 2022-08-05 PROCEDURE — 30233N1 TRANSFUSION OF NONAUTOLOGOUS RED BLOOD CELLS INTO PERIPHERAL VEIN, PERCUTANEOUS APPROACH: ICD-10-PCS | Performed by: FAMILY MEDICINE

## 2022-08-05 PROCEDURE — 94760 N-INVAS EAR/PLS OXIMETRY 1: CPT

## 2022-08-05 PROCEDURE — 99231 SBSQ HOSP IP/OBS SF/LOW 25: CPT | Performed by: FAMILY MEDICINE

## 2022-08-05 PROCEDURE — 82948 REAGENT STRIP/BLOOD GLUCOSE: CPT

## 2022-08-05 PROCEDURE — 80048 BASIC METABOLIC PNL TOTAL CA: CPT | Performed by: FAMILY MEDICINE

## 2022-08-05 PROCEDURE — P9016 RBC LEUKOCYTES REDUCED: HCPCS

## 2022-08-05 PROCEDURE — 80202 ASSAY OF VANCOMYCIN: CPT | Performed by: FAMILY MEDICINE

## 2022-08-05 PROCEDURE — 99232 SBSQ HOSP IP/OBS MODERATE 35: CPT | Performed by: THORACIC SURGERY (CARDIOTHORACIC VASCULAR SURGERY)

## 2022-08-05 PROCEDURE — 99233 SBSQ HOSP IP/OBS HIGH 50: CPT | Performed by: INTERNAL MEDICINE

## 2022-08-05 PROCEDURE — 85027 COMPLETE CBC AUTOMATED: CPT | Performed by: FAMILY MEDICINE

## 2022-08-05 RX ORDER — DEXTROSE AND SODIUM CHLORIDE 5; .9 G/100ML; G/100ML
50 INJECTION, SOLUTION INTRAVENOUS CONTINUOUS
Status: DISCONTINUED | OUTPATIENT
Start: 2022-08-06 | End: 2022-08-06

## 2022-08-05 RX ORDER — INSULIN GLARGINE 100 [IU]/ML
15 INJECTION, SOLUTION SUBCUTANEOUS
Status: DISCONTINUED | OUTPATIENT
Start: 2022-08-05 | End: 2022-08-06

## 2022-08-05 RX ADMIN — INSULIN LISPRO 20 UNITS: 100 INJECTION, SOLUTION INTRAVENOUS; SUBCUTANEOUS at 16:41

## 2022-08-05 RX ADMIN — BUMETANIDE 2 MG: 2 TABLET ORAL at 17:32

## 2022-08-05 RX ADMIN — HYDROMORPHONE HYDROCHLORIDE 2 MG: 2 TABLET ORAL at 10:15

## 2022-08-05 RX ADMIN — INSULIN LISPRO 20 UNITS: 100 INJECTION, SOLUTION INTRAVENOUS; SUBCUTANEOUS at 12:25

## 2022-08-05 RX ADMIN — DIAZEPAM 5 MG: 5 TABLET ORAL at 13:25

## 2022-08-05 RX ADMIN — LEVALBUTEROL HYDROCHLORIDE 1.25 MG: 1.25 SOLUTION, CONCENTRATE RESPIRATORY (INHALATION) at 08:35

## 2022-08-05 RX ADMIN — LIDOCAINE 5% 2 PATCH: 700 PATCH TOPICAL at 09:58

## 2022-08-05 RX ADMIN — INSULIN LISPRO 2 UNITS: 100 INJECTION, SOLUTION INTRAVENOUS; SUBCUTANEOUS at 21:15

## 2022-08-05 RX ADMIN — INSULIN LISPRO 6 UNITS: 100 INJECTION, SOLUTION INTRAVENOUS; SUBCUTANEOUS at 12:25

## 2022-08-05 RX ADMIN — PREGABALIN 25 MG: 25 CAPSULE ORAL at 17:32

## 2022-08-05 RX ADMIN — HEPARIN SODIUM 2000 UNITS: 1000 INJECTION INTRAVENOUS; SUBCUTANEOUS at 14:25

## 2022-08-05 RX ADMIN — IPRATROPIUM BROMIDE 0.5 MG: 0.5 SOLUTION RESPIRATORY (INHALATION) at 13:42

## 2022-08-05 RX ADMIN — PREGABALIN 75 MG: 75 CAPSULE ORAL at 09:58

## 2022-08-05 RX ADMIN — INSULIN LISPRO 2 UNITS: 100 INJECTION, SOLUTION INTRAVENOUS; SUBCUTANEOUS at 06:14

## 2022-08-05 RX ADMIN — ACETAMINOPHEN 975 MG: 325 TABLET ORAL at 13:25

## 2022-08-05 RX ADMIN — HEPARIN SODIUM 14 UNITS/KG/HR: 10000 INJECTION, SOLUTION INTRAVENOUS at 17:56

## 2022-08-05 RX ADMIN — METOPROLOL SUCCINATE 50 MG: 50 TABLET, EXTENDED RELEASE ORAL at 06:09

## 2022-08-05 RX ADMIN — AMIODARONE HYDROCHLORIDE 100 MG: 200 TABLET ORAL at 10:16

## 2022-08-05 RX ADMIN — LEVALBUTEROL HYDROCHLORIDE 1.25 MG: 1.25 SOLUTION, CONCENTRATE RESPIRATORY (INHALATION) at 19:09

## 2022-08-05 RX ADMIN — DEXTROSE AND SODIUM CHLORIDE 50 ML/HR: 5; .9 INJECTION, SOLUTION INTRAVENOUS at 23:37

## 2022-08-05 RX ADMIN — METOPROLOL SUCCINATE 50 MG: 50 TABLET, EXTENDED RELEASE ORAL at 17:32

## 2022-08-05 RX ADMIN — VANCOMYCIN HYDROCHLORIDE 1250 MG: 10 INJECTION, POWDER, LYOPHILIZED, FOR SOLUTION INTRAVENOUS at 16:40

## 2022-08-05 RX ADMIN — BENZONATATE 100 MG: 100 CAPSULE ORAL at 13:25

## 2022-08-05 RX ADMIN — IPRATROPIUM BROMIDE 0.5 MG: 0.5 SOLUTION RESPIRATORY (INHALATION) at 19:09

## 2022-08-05 RX ADMIN — INSULIN LISPRO 20 UNITS: 100 INJECTION, SOLUTION INTRAVENOUS; SUBCUTANEOUS at 10:04

## 2022-08-05 RX ADMIN — INSULIN GLARGINE 15 UNITS: 100 INJECTION, SOLUTION SUBCUTANEOUS at 21:15

## 2022-08-05 RX ADMIN — PANTOPRAZOLE SODIUM 40 MG: 40 TABLET, DELAYED RELEASE ORAL at 06:09

## 2022-08-05 RX ADMIN — BUMETANIDE 2 MG: 2 TABLET ORAL at 09:59

## 2022-08-05 RX ADMIN — SPIRONOLACTONE 100 MG: 50 TABLET ORAL at 09:56

## 2022-08-05 RX ADMIN — IPRATROPIUM BROMIDE 0.5 MG: 0.5 SOLUTION RESPIRATORY (INHALATION) at 08:35

## 2022-08-05 RX ADMIN — ATORVASTATIN CALCIUM 40 MG: 40 TABLET, FILM COATED ORAL at 17:32

## 2022-08-05 RX ADMIN — LEVALBUTEROL HYDROCHLORIDE 1.25 MG: 1.25 SOLUTION, CONCENTRATE RESPIRATORY (INHALATION) at 13:42

## 2022-08-05 RX ADMIN — INSULIN LISPRO 2 UNITS: 100 INJECTION, SOLUTION INTRAVENOUS; SUBCUTANEOUS at 16:41

## 2022-08-05 RX ADMIN — ESCITALOPRAM OXALATE 10 MG: 10 TABLET ORAL at 09:57

## 2022-08-05 NOTE — ASSESSMENT & PLAN NOTE
· Continues with intractable pain at prior chest tube site on right anterior chest wall  This was removed 7/24/22  · Was followed by APS back in [address] [place] [address], S/P epidural catheter which was removed 8/2  · Current pain regimen: Diaz Tylenol 975 Q8h, PO Dilaudid 2/4 for mod/severe, IV Dilaudid 0 5 mg Q2  · Bowel regimen on board:  Scheduled senna and MiraLax daily  · Plan to discontinue IV Dilaudid when Chest tube removed

## 2022-08-05 NOTE — ASSESSMENT & PLAN NOTE
Results from last 7 days   Lab Units 08/05/22  0209 08/04/22  0454 08/03/22  0559 08/02/22  0634 08/01/22  2026 08/01/22  0457 07/31/22  0443   HEMOGLOBIN g/dL 7 2* 6 8* 7 9* 8 2* 7 7* 7 8* 8 4*   HEMATOCRIT % 24 2* 23 5* 27 4* 27 4* 25 7* 27 8* 28 1*     Most likely anemia of chronic disease due to prolonged illness  No active bleeding noted  Hg (8/4): 6 8 s/p 1 I unit PRBC's   Hg (8/5): 7 2   Will give additional 1 unit to optimize for upcoming surgery  Hb stable, monitor closely  Consider transfusing if < 8 due to history of CAD  Continue Iron replacement as well as daily miralax

## 2022-08-05 NOTE — ASSESSMENT & PLAN NOTE
Wt Readings from Last 3 Encounters:   08/05/22 79 9 kg (176 lb 2 4 oz)   08/02/22 77 7 kg (171 lb 6 4 oz)   07/19/22 78 2 kg (172 lb 6 4 oz)     · Initially admitted to [facility] on 07/15 with CHF exacerbation, now optimized  · Most recent EF 50% with normal systolic and diastolic function on 3/95/9992  Currently stable and euvolemic  · On Bumex 2 mg BID with Aldactone 100 mg daily - restarted on 7/29  · Continue low-salt diet, strict I's and O's monitoring  · Per Cardiology at [facility] 1153 upon discharge    · Will need follow-up with General Cardiology & EP service upon discharge for ICD consideration

## 2022-08-05 NOTE — PROGRESS NOTES
[address] [facility]  Progress Note - Brittanie Meo 1965, 65 y o  female MRN: [id number]  Unit/Bed#: [facility] 429-01 Encounter: [id number]  Primary Care Provider: Shyann Sierra MD   Date and time admitted to hospital: 8/2/2022  8:31 PM    * Empyema lung, Right  Assessment & Plan  · Loculated right pleural effusion  S/P right-sided chest tube insertion and removal on 07/24  · CT of the chest on 7/28/22 revealed moderate partially loculated right pleural effusion  · Right chest tube re-placed on 7/29/22 by IR  · Pleural fluid analysis shows neutrophil predominant WBC count  · Fluid cultures from prior anterior chest tube site positive for MRSA  · 8/1 - CXR: Small component of right pleural effusion suspected which may be partially loculated  Indwelling right thoracostomy tube without pneumothorax  · 8/2 CT Chest:  Decreased size of a right-sided pleural effusion and improved aeration of the right lower lobe post placement of a right pleural drainage catheter, with unchanged appearance of a loculated component of the effusion superomedially  There is slightly  increased density of the effusion compatible with a small amount of blood products  2   Appearance of new inflammatory groundglass opacities in the left lower lobe  3   Mild background interstitial edema is unchanged    Plan:  - Continue Abx w/ Vanc given MRSA, Vanc will need to be continued through 8/16 per ID  - Plans for VATS/[place]out/Decortation  on 8/6   - NPO at midnight   - Maintain CT to suction and monitor output  - Monitor fever curve and white count closely    Acute on chronic heart failure with preserved ejection fraction (HCC)  Assessment & Plan  Wt Readings from Last 3 Encounters:   08/05/22 79 9 kg (176 lb 2 4 oz)   08/02/22 77 7 kg (171 lb 6 4 oz)   07/19/22 78 2 kg (172 lb 6 4 oz)     · Initially admitted to [facility] on 07/15 with CHF exacerbation, now optimized  · Most recent EF 50% with normal systolic and diastolic function on 2/24/2022  Currently stable and euvolemic  · On Bumex 2 mg BID with Aldactone 100 mg daily - restarted on 7/29  · Continue low-salt diet, strict I's and O's monitoring  · Per Cardiology at [facility]3 upon discharge  · Will need follow-up with General Cardiology & EP service upon discharge for ICD consideration    Chest wall wound infection  Assessment & Plan  · Purulent discharge noted at site of recent chest tube placement  · Cultures positive for MRSA on 07/28 susceptible to vanc  · Currently on IV vancomycin - will continue  · Continue local wound care/dressing changes    Pain at Chest tube insertion site  Assessment & Plan  · Continues with intractable pain at prior chest tube site on right anterior chest wall  This was removed 7/24/22  · Was followed by [facility] back in [facility], S/P epidural catheter which was removed 8/2  · Current pain regimen: Diaz Tylenol 975 Q8h, PO Dilaudid 2/4 for mod/severe, IV Dilaudid 0 5 mg Q2  · Bowel regimen on board:  Scheduled senna and MiraLax daily  · Plan to discontinue IV Dilaudid when Chest tube removed  Acute Respiratory insufficiency on chronic hypoxic respiratory failure  Assessment & Plan  · She is status post trach  Currently on 6-8L with sats in the high 90s, at home uses 10 L  · Status post recent right pneumothorax requiring chest tube and now with right empyema  · Continue aggressive respiratory protocol, p r n  suctioning  · Continue Xoponex and Atrovent per Pulm q6hrs  · Encourage out of bed, incentive spirometry  · Pulmonology following     CKD (chronic kidney disease) stage 3, GFR 30-59 ml/min ([facility])  Assessment & Plan  · MODESTO noted on initial admission to [facility] with elevated Cr 1 64, now resolved  · Baseline Cr being 0 9-1 0  · Continue to monitor closely   Losartan on hold  · Avoid nephrotoxic agents    Anemia  Assessment & Plan    Results from last 7 days   Lab Units 08/05/22  0209 08/04/22  0454 08/03/22  0540 08/02/22  7620 08/01/22 2026 08/01/22  0457 07/31/22  0443   HEMOGLOBIN g/dL 7 2* 6 8* 7 9* 8 2* 7 7* 7 8* 8 4*   HEMATOCRIT % 24 2* 23 5* 27 4* 27 4* 25 7* 27 8* 28 1*     Most likely anemia of chronic disease due to prolonged illness  No active bleeding noted  Hg (8/4): 6 8 s/p 1 I unit PRBC's   Hg (8/5): 7 2  Will give additional 1 unit to optimize for upcoming surgery  Hb stable, monitor closely  Consider transfusing if < 8 due to history of CAD  Continue Iron replacement as well as daily miralax    Tracheostomy in place [facility])  Assessment & Plan  · Trach placed in the context of cardiac arrest/prolonged ventilator dependent state November 2021  · Decannulated at [facility] 12/11/21  · Patient requires frequent tracheostomy changes and suctioning  · Per discussion with speech therapy,  video barium swallow was done for sense of food getting stuck  Appropriate for regular diet  · On trach collar O2 with humidification and tolerating well btw 6-8L    CAD (coronary artery disease)  Assessment & Plan  · STEMI 10/22/2021 s/p PATRICA mid circumflex OM1  OM2 was ballooned but not stented    · Pulseless VT 10/27/21 - repeat C 90% mid circumflex stenosis, but could not be intervened on  · VT 10/28/21 [facility]:  found to have apical LAD complete occlusion was felt to be small to be intervened    · Cardiac carrest 1/1/22 - [facility] felt to be hypoxic vs  VT induced  · On metoprolol 50mg BID, Brilinta 90 mg BID and Lipitor 40 mg qd    A-fib [facility])  Assessment & Plan  Follows closely with Cardiology  Rhythm controlled with amiodarone 100 mg daily and AC with Eliquis 5 mg BID  Eliquis on Hold per cardiology, for upcoming VATS procedure    Type 2 diabetes mellitus with hyperglycemia [facility])  Assessment & Plan  Lab Results   Component Value Date    HGBA1C 12 7 (H) 05/22/2022       Recent Labs     08/04/22  2101 08/04/22  2148 08/05/22  0612 08/05/22  1036   POCGLU 79 87 172* 261*     Uncontrolled per most recent A1c  Home regimen: Lantus 16U QHS and Novolog 4U TID  Current inpatient regimen: Lantus 30U qHS + Humalog 20U TID + SSI 4  Patietn NPO at midnight, will adjust insulin Regimen: Lantus 15U, Humalog 20U TID with meals and SSI  Will continue for now and adjust as needed to maintain  - 180    Hypertension  Assessment & Plan  BP stable  On home losartan 50 mg BID, follows closely with Cardiology  Losartan on hold but consider restarting as MODESTO has now resolved  Monitor BP closely    Hyperlipidemia associated with type 2 diabetes mellitus (HCC)  Assessment & Plan  - Continue Atorvastatin 40 mg daily  Diabetic peripheral neuropathy (HCC)  Assessment & Plan  Continue PTA Lyrica as documented    Anxiety  Assessment & Plan  On Lexapro 10 mg daily as well as Atarax prn          Subjective/Objective     Subjective:   Patient seen and examined at bedside  No overnight events  Patient endorses to feeling weak and still has pain at chest tube site  She otherwise denies complains of chest pain, SOB, palpitation, headache dizziness  She is able to tolerate a diet without difficulties  Saturating at her baseline oxygen     Objective:  Vitals: Blood pressure 136/65, pulse 72, temperature 99 7 °F (37 6 °C), temperature source Oral, resp  rate 20, weight 79 9 kg (176 lb 2 4 oz), SpO2 90 %  ,Body mass index is 26 01 kg/m²        Intake/Output Summary (Last 24 hours) at 8/5/2022 1339  Last data filed at 8/5/2022 1256  Gross per 24 hour   Intake 2080 ml   Output 2650 ml   Net -570 ml       Invasive Devices  Report    Peripheral Intravenous Line  Duration           Peripheral IV 08/04/22 Proximal;Right;Ventral (anterior) Forearm 1 day    Peripheral IV 08/04/22 Right;Upper;Ventral (anterior) Arm <1 day          Epidural Line  Duration           Nerve Block Catheter 07/29/22 7 days          Drain  Duration           Chest Tube 1 Right Posterior 12 Fr  6 days          Airway  Duration           Surgical Airway Shiley Fenestrated 156 days      Lab, Imaging and other studies: I have personally reviewed pertinent reports  Results from last 7 days   Lab Units 08/05/22  0209 08/04/22  0454 08/03/22  0559 08/02/22  0634 08/01/22 2026 08/01/22  0457 07/31/22  0443 07/30/22  0459   WBC Thousand/uL 7 01 7 56 9 84 14 29*  --  17 46* 16 13* 18 08*   HEMOGLOBIN g/dL 7 2* 6 8* 7 9* 8 2*   < > 7 8* 8 4* 7 6*   HEMATOCRIT % 24 2* 23 5* 27 4* 27 4*   < > 27 8* 28 1* 25 0*   PLATELETS Thousands/uL 499* 535* 611* 625*  --  611* 617* 583*   NEUTROS PCT %  --   --   --   --   --   --  79* 85*   MONOS PCT %  --   --   --   --   --   --  10 8   MONO PCT %  --   --   --  8  --  5  --   --     < > = values in this interval not displayed       Results from last 7 days   Lab Units 08/05/22  0226 08/04/22  0454 08/03/22  0559 08/02/22  0634   POTASSIUM mmol/L 4 7 4 2 3 7 3 8   CHLORIDE mmol/L 102 99 101 98   CO2 mmol/L 28 26 26 27   BUN mg/dL 40* 42* 39* 35*   CREATININE mg/dL 1 49* 1 61* 1 34* 1 14   CALCIUM mg/dL 7 8* 8 2* 8 9 8 5   ALK PHOS U/L  --   --  81 75   ALT U/L  --   --  18 7   AST U/L  --   --  33 16         Lab Results   Component Value Date    PHOS 4 6 (H) 07/18/2022    PHOS 3 9 07/17/2022    PHOS 4 2 07/16/2022      Results from last 7 days   Lab Units 08/05/22  1039 08/05/22  0147 08/04/22  1442   INR   --   --  1 28*   PTT seconds 43* 62* 33         0   Lab Value Date/Time    TROPONINI >40 00 (H) 10/24/2021 0839    TROPONINI >40 00 (H) 10/22/2021 1851    TROPONINI >40 00 (H) 10/22/2021 1442    TROPONINI <0 03 08/26/2021 0802    TROPONINI <0 03 04/18/2021 1413    TROPONINI <0 02 11/17/2020 1537    TROPONINI <0 03 07/26/2020 1821    TROPONINI <0 02 06/02/2020 2250    TROPONINI <0 02 06/02/2020 1952    TROPONINI <0 02 03/04/2019 2122    TROPONINI <0 02 10/07/2018 1516    TROPONINI <0 02 08/12/2018 0042    TROPONINI <0 02 08/11/2018 2058    TROPONINI <0 02 08/11/2018 1543    TROPONINI <0 02 08/10/2018 1912     ABG:  Lab Results   Component Value Date    PHART 7 473 (H) 05/11/2022    [id number] 27 7 (LL) 05/11/2022    PO2ART 113 0 05/11/2022    [id number] 19 8 (L) 05/11/2022    BEART -2 8 05/11/2022    SOURCE Radial, Right 05/11/2022     VTE Pharmacologic Prophylaxis: Heparin     VTE Mechanical Prophylaxis: sequential compression device

## 2022-08-05 NOTE — PROGRESS NOTES
Progress Note - Infectious Disease   Kelli Red 65 y o  female MRN: [id number]  Unit/Bed#: [facility] 429-01 Encounter: [id number]      Impression/Recommendations:  1  Probable right-sided empyema   CT show loculated right pleural effusion   Patient is status post placement of chest tube on 07/29, with pleural fluid parameters consistent with empyema   Culture has no growth, likely due to prior antibiotic   Given MRSA in right chest wound, MRSA is likely pathogen in empyema   Despite chest tube drainage, repeat chest CT on 08/02 showed persistent and unchanged loculated empyema   Thoracic surgery evaluation noted, with plan for VATS/decortication in progress  Continue IV vancomycin  Monitor temperature/WBC  Continue chest tube drainage per Pulmonary/IR  Thoracic surgery's plan for VATS/decortication tomorrow noted  Treat x at least 2 weeks after VATS      2  Developing sepsis, with leukocytosis and tachypnea, likely secondary to empyema above   Patient is systemically improved   WBC has normalized   Tachypnea resolved  Mandy Sawant Antibiotic plan as in below  Monitor temperature/WBC  Monitor hemodynamics      3  MRSA right chest wall infection, as site recent/prior chest tube placement for spontaneous pneumothorax  Antibiotic plan as in above  Serial exams      4  Acute on chronic hypoxic respiratory failure, likely multifactorial   Respiratory culture with MRSA and Pseudomonas but no obvious pneumonia   These isolates are most likely airway colonization  Management per primary service      5  Recent spontaneous pneumothorax, status post chest tube placement on 07/20   This was removed on 07/24      6  DM, type 2, poorly controlled, with hyperglycemia and elevated hemoglobin A1c   This is risk factor for infection above  Management per primary service      7  MODESTO   Creatinine has been improving, although rising for the last 2 days  Antibiotic dosages adjusted accordingly    Monitor creatinine      Discussed with patient in detail regarding the above plan      Antibiotics:  Vancomycin # 10     Subjective:  Patient with stable chest wall pain, at chest tube site, controlled  Temperature stays down  No chills  She is tolerating antibiotic well  No nausea, vomiting or diarrhea      Objective:  Vitals:  Temp:  [98 1 °F (36 7 °C)-100 5 °F (38 1 °C)] 99 7 °F (37 6 °C)  HR:  [64-77] 72  Resp:  [14-20] 20  BP: ()/(48-77) 136/65  SpO2:  [90 %-98 %] 90 %  Temp (24hrs), Av 1 °F (37 3 °C), Min:98 1 °F (36 7 °C), Max:100 5 °F (38 1 °C)  Current: Temperature: 99 7 °F (37 6 °C)    Physical Exam:     General: Awake, alert, cooperative, no distress  Neck:  Supple  No mass  No lymphadenopathy  Lungs: Decreased breath sounds on right, stable right basilar rhonchi and rales, no wheezing, respirations unlabored  Heart:  Regular rate and rhythm, S1 and S2 normal, no murmur  Abdomen: Soft, nondistended, non-tender, bowel sounds active all four quadrants, no masses, no organomegaly  Extremities: Stable mild leg edema  No erythema/warmth  No draining ulcer  Nontender to palpation  Skin:  No rash  Neuro: Moves all extremities  Invasive Devices  Report    Peripheral Intravenous Line  Duration           Peripheral IV 22 Proximal;Right;Ventral (anterior) Forearm 1 day    Peripheral IV 22 Right;Upper;Ventral (anterior) Arm <1 day          Epidural Line  Duration           Nerve Block Catheter 22 7 days          Drain  Duration           Chest Tube 1 Right Posterior 12 Fr  6 days          Airway  Duration           Surgical Airway Shiley Fenestrated 156 days                Labs studies:   I have personally reviewed pertinent labs    Results from last 7 days   Lab Units 22  0226 22  0454 22  0559 22  0634   POTASSIUM mmol/L 4 7 4 2 3 7 3 8   CHLORIDE mmol/L 102 99 101 98   CO2 mmol/L 28 26 26 27   BUN mg/dL 40* 42* 39* 35*   CREATININE mg/dL 1 49* 1 61* 1 34* 1 14   EGFR ml/min/1 73sq m 38 35 44 53   CALCIUM mg/dL 7 8* 8 2* 8 9 8 5   AST U/L  --   --  33 16   ALT U/L  --   --  18 7   ALK PHOS U/L  --   --  81 75     Results from last 7 days   Lab Units 08/05/22  0209 08/04/22  0454 08/03/22  0559   WBC Thousand/uL 7 01 7 56 9 84   HEMOGLOBIN g/dL 7 2* 6 8* 7 9*   PLATELETS Thousands/uL 499* 535* 611*     Results from last 7 days   Lab Units 07/29/22  1529   GRAM STAIN RESULT  1+ Polys  No organisms seen   BODY FLUID CULTURE, STERILE  No growth       Imaging Studies:   I have personally reviewed pertinent imaging study reports and images in PACS  EKG, Pathology, and Other Studies:   I have personally reviewed pertinent reports

## 2022-08-05 NOTE — ASSESSMENT & PLAN NOTE
Lab Results   Component Value Date    HGBA1C 12 7 (H) 05/22/2022       Recent Labs     08/04/22  2101 08/04/22  2148 08/05/22  0612 08/05/22  1036   POCGLU 79 87 172* 261*     Uncontrolled per most recent A1c  Home regimen: Lantus 16U QHS and Novolog 4U TID  Current inpatient regimen: Lantus 30U qHS + Humalog 20U TID + SSI 4  Patietn NPO at midnight, will adjust insulin Regimen: Lantus 15U, Humalog 20U TID with meals and SSI     Will continue for now and adjust as needed to maintain  - 180

## 2022-08-05 NOTE — ASSESSMENT & PLAN NOTE
· Trach placed in the context of cardiac arrest/prolonged ventilator dependent state November 2021  · Decannulated at [facility] 12/11/21  · Patient requires frequent tracheostomy changes and suctioning  · Per discussion with speech therapy,  video barium swallow was done for sense of food getting stuck   Appropriate for regular diet  · On trach collar O2 with humidification and tolerating well btw 6-8L

## 2022-08-05 NOTE — PHYSICAL THERAPY NOTE
08/05/22 0855   Note Type   Note type Evaluation; Cancelled Session   Cancel Reasons Patient off floor/test   Pt pending OR  Will follow    Tha Jama PT, DPT CSRS

## 2022-08-05 NOTE — PROGRESS NOTES
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) and MRSA chest wall infection who presented with R empyema s/p R chest tube placement 7/29 and tPA at [address]  Pt mildly febrile overnight to 100 5, all other VSS  CT (-20, -AL): no output recorded    Plan:  - plan for OR tomorrow for R VATS/washout/decort  - diet as tolerated, NPO at midnight  - maintain CT to suction and monitor output  - brilinta and eliquis held per cards, appreciate all other cards recs  - f/u anesthesia for pre-op evaulation  - continue abx per ID, appreciate all other ID recs  - DVT: heparin  - aggressive pulmonary toilet, encourage IS use  - encourage ambulation  - rest of care per primary team    Subjective/Objective   Subjective: No acute events overnight  Pt mildly febrile overnight to 100 5  Objective:    Blood pressure (!) 93/48, pulse 66, temperature 98 7 °F (37 1 °C), temperature source Oral, resp  rate 20, weight 77 6 kg (171 lb 1 2 oz), SpO2 92 %  ,Body mass index is 25 26 kg/m²  I/O last 24 hours:   In: 4071 [P O :1340; Blood:350]  Out: 1800 [Urine:1800]    Invasive Devices  Report    Peripheral Intravenous Line  Duration           Peripheral IV 08/04/22 Proximal;Right;Ventral (anterior) Forearm <1 day    Peripheral IV 08/04/22 Right;Upper;Ventral (anterior) Arm <1 day          Epidural Line  Duration           Nerve Block Catheter 07/29/22 6 days          Drain  Duration           Chest Tube 1 Right Posterior 12 Fr  6 days          Airway  Duration           Surgical Airway Shiley Fenestrated 156 days                Physical Exam: General appearance: alert and oriented, in no acute distress  Head: Normocephalic, without obvious abnormality, atraumatic  Neck: tracheostomy in place  Lungs: Normal effort, no respiratory distress  Heart: regular rate and rhythm  Abdomen: normal findings: soft, non-tender  Pulses: 2+ and symmetric  Skin: Skin color, texture, turgor normal  No rashes or lesions  Neurologic: Grossly normal    Lab, Imaging and other studies:  Lab Results   Component Value Date    WBC 7 01 08/05/2022    HGB 7 2 (L) 08/05/2022    HCT 24 2 (L) 08/05/2022    MCV 79 (L) 08/05/2022     (H) 08/05/2022      Lab Results   Component Value Date    GLUCOSE 185 (H) 10/28/2021    CALCIUM 7 8 (L) 08/05/2022     07/19/2015    K 4 7 08/05/2022    CO2 28 08/05/2022     08/05/2022    BUN 40 (H) 08/05/2022    CREATININE 1 49 (H) 08/05/2022       VTE Pharmacologic Prophylaxis: Heparin  VTE Mechanical Prophylaxis: sequential compression device    Donaldo Wheatley (MS-4)  [facility]  [address]

## 2022-08-05 NOTE — PROGRESS NOTES
Patient offered assistance to get out of bed into chair to improve mobility and breathing  Patient refused reporting to feel too much pain when in the chair  Patient was educated on the risks associated with not mobilizing or utilizing chair  Patient verbalized understanding

## 2022-08-05 NOTE — ASSESSMENT & PLAN NOTE
Follows closely with Cardiology  Rhythm controlled with amiodarone 100 mg daily and AC with Eliquis 5 mg BID  Eliquis on Hold per cardiology, for upcoming VATS procedure

## 2022-08-05 NOTE — ASSESSMENT & PLAN NOTE
· Loculated right pleural effusion  S/P right-sided chest tube insertion and removal on 07/24  · CT of the chest on 7/28/22 revealed moderate partially loculated right pleural effusion  · Right chest tube re-placed on 7/29/22 by IR  · Pleural fluid analysis shows neutrophil predominant WBC count  · Fluid cultures from prior anterior chest tube site positive for MRSA  · 8/1 - CXR: Small component of right pleural effusion suspected which may be partially loculated  Indwelling right thoracostomy tube without pneumothorax  · 8/2 CT Chest:  Decreased size of a right-sided pleural effusion and improved aeration of the right lower lobe post placement of a right pleural drainage catheter, with unchanged appearance of a loculated component of the effusion superomedially  There is slightly  increased density of the effusion compatible with a small amount of blood products  2   Appearance of new inflammatory groundglass opacities in the left lower lobe  3   Mild background interstitial edema is unchanged    Plan:  - Continue Abx w/ Vanc given MRSA, Vanc will need to be continued through 8/16 per ID  - Plans for VATS/Washout/Decortation  on 8/6   - NPO at midnight   - Maintain CT to suction and monitor output  - Monitor fever curve and white count closely

## 2022-08-05 NOTE — QUICK NOTE
Patient's oxygen requirements have not changed today  Chest tube still in place  Plans for VATS procedure tomorrow  Will re-evaluate patient after VATS procedure is complete, please feel free to reach out if any questions or concerns

## 2022-08-05 NOTE — ASSESSMENT & PLAN NOTE
· STEMI 10/22/2021 s/p PATRICA mid circumflex OM1  OM2 was ballooned but not stented    · Pulseless VT 10/27/21 - repeat LHC 90% mid circumflex stenosis, but could not be intervened on  · VT 10/28/21 LHC:  found to have apical LAD complete occlusion was felt to be small to be intervened    · Cardiac carrest 1/1/22 - [facility] felt to be hypoxic vs  VT induced  · On metoprolol 50mg BID, Brilinta 90 mg BID and Lipitor 40 mg qd

## 2022-08-05 NOTE — CASE MANAGEMENT
Case Management Progress Note    Patient name Noble Aschoff  Location [facility] 429-01 MRN [id number]  : 1965 Date 2022       LOS (days): 3  Geometric Mean LOS (GMLOS) (days): 5 40  Days to GMLOS:2 7        Pt was transferred to [facility] on 22 from [facility] where pt was originally admitted on 22  For assessment information, please refer to the Case Management Admission Assessment note documented by [facility] on 22 at 11:04AM     CM to follow

## 2022-08-05 NOTE — ASSESSMENT & PLAN NOTE
· MODESTO noted on initial admission to [facility] with elevated Cr 1 64, now resolved  · Baseline Cr being 0 9-1 0  · Continue to monitor closely   Losartan on hold  · Avoid nephrotoxic agents

## 2022-08-06 ENCOUNTER — ANESTHESIA (INPATIENT)
Dept: PERIOP | Facility: HOSPITAL | Age: 57
DRG: 853 | End: 2022-08-06
Payer: COMMERCIAL

## 2022-08-06 ENCOUNTER — ANESTHESIA EVENT (INPATIENT)
Dept: PERIOP | Facility: HOSPITAL | Age: 57
DRG: 853 | End: 2022-08-06
Payer: COMMERCIAL

## 2022-08-06 ENCOUNTER — APPOINTMENT (INPATIENT)
Dept: RADIOLOGY | Facility: HOSPITAL | Age: 57
DRG: 853 | End: 2022-08-06
Payer: COMMERCIAL

## 2022-08-06 LAB
ABO GROUP BLD BPU: NORMAL
ANION GAP SERPL CALCULATED.3IONS-SCNC: 3 MMOL/L (ref 4–13)
ANION GAP SERPL CALCULATED.3IONS-SCNC: 3 MMOL/L (ref 4–13)
APTT PPP: 37 SECONDS (ref 23–37)
BASE EXCESS BLDA CALC-SCNC: -1 MMOL/L (ref -2–3)
BASE EXCESS BLDA CALC-SCNC: -2 MMOL/L (ref -2–3)
BASE EXCESS BLDA CALC-SCNC: 1 MMOL/L (ref -2–3)
BASOPHILS # BLD AUTO: 0.02 THOUSANDS/ΜL (ref 0–0.1)
BASOPHILS NFR BLD AUTO: 0 % (ref 0–1)
BPU ID: NORMAL
BUN SERPL-MCNC: 38 MG/DL (ref 5–25)
BUN SERPL-MCNC: 41 MG/DL (ref 5–25)
CA-I BLD-SCNC: 1.07 MMOL/L (ref 1.12–1.32)
CA-I BLD-SCNC: 1.1 MMOL/L (ref 1.12–1.32)
CA-I BLD-SCNC: 1.33 MMOL/L (ref 1.12–1.32)
CALCIUM SERPL-MCNC: 7.7 MG/DL (ref 8.3–10.1)
CALCIUM SERPL-MCNC: 8.5 MG/DL (ref 8.3–10.1)
CFFMA (FUNCTIONAL FIBRINOGEN MAX AMPLITUDE): >52 MM (ref 15–32)
CHLORIDE SERPL-SCNC: 101 MMOL/L (ref 96–108)
CHLORIDE SERPL-SCNC: 106 MMOL/L (ref 96–108)
CKLY30: 0.4 % (ref 0–2.6)
CKR(REACTION TIME): 6.2 MIN (ref 4.6–9.1)
CO2 SERPL-SCNC: 27 MMOL/L (ref 21–32)
CO2 SERPL-SCNC: 28 MMOL/L (ref 21–32)
CREAT SERPL-MCNC: 1.51 MG/DL (ref 0.6–1.3)
CREAT SERPL-MCNC: 1.68 MG/DL (ref 0.6–1.3)
CROSSMATCH: NORMAL
CRTMA(RAPIDTEG MAX AMPLITUDE): >75 MM (ref 52–70)
EOSINOPHIL # BLD AUTO: 0.08 THOUSAND/ΜL (ref 0–0.61)
EOSINOPHIL NFR BLD AUTO: 1 % (ref 0–6)
ERYTHROCYTE [DISTWIDTH] IN BLOOD BY AUTOMATED COUNT: 18.5 % (ref 11.6–15.1)
ERYTHROCYTE [DISTWIDTH] IN BLOOD BY AUTOMATED COUNT: 18.6 % (ref 11.6–15.1)
GFR SERPL CREATININE-BSD FRML MDRD: 33 ML/MIN/1.73SQ M
GFR SERPL CREATININE-BSD FRML MDRD: 38 ML/MIN/1.73SQ M
GLUCOSE SERPL-MCNC: 143 MG/DL (ref 65–140)
GLUCOSE SERPL-MCNC: 169 MG/DL (ref 65–140)
GLUCOSE SERPL-MCNC: 201 MG/DL (ref 65–140)
GLUCOSE SERPL-MCNC: 220 MG/DL (ref 65–140)
GLUCOSE SERPL-MCNC: 223 MG/DL (ref 65–140)
GLUCOSE SERPL-MCNC: 224 MG/DL (ref 65–140)
GLUCOSE SERPL-MCNC: 237 MG/DL (ref 65–140)
GLUCOSE SERPL-MCNC: 249 MG/DL (ref 65–140)
GLUCOSE SERPL-MCNC: 260 MG/DL (ref 65–140)
HCO3 BLDA-SCNC: 23.9 MMOL/L (ref 22–28)
HCO3 BLDA-SCNC: 24.9 MMOL/L (ref 22–28)
HCO3 BLDA-SCNC: 28.4 MMOL/L (ref 22–28)
HCT VFR BLD AUTO: 27.8 % (ref 34.8–46.1)
HCT VFR BLD AUTO: 31 % (ref 34.8–46.1)
HCT VFR BLD AUTO: 31.1 % (ref 34.8–46.1)
HCT VFR BLD CALC: 24 % (ref 34.8–46.1)
HCT VFR BLD CALC: 27 % (ref 34.8–46.1)
HCT VFR BLD CALC: 27 % (ref 34.8–46.1)
HGB BLD-MCNC: 8.5 G/DL (ref 11.5–15.4)
HGB BLD-MCNC: 9.2 G/DL (ref 11.5–15.4)
HGB BLD-MCNC: 9.4 G/DL (ref 11.5–15.4)
HGB BLDA-MCNC: 8.2 G/DL (ref 11.5–15.4)
HGB BLDA-MCNC: 9.2 G/DL (ref 11.5–15.4)
HGB BLDA-MCNC: 9.2 G/DL (ref 11.5–15.4)
IMM GRANULOCYTES # BLD AUTO: 0.14 THOUSAND/UL (ref 0–0.2)
IMM GRANULOCYTES NFR BLD AUTO: 2 % (ref 0–2)
LYMPHOCYTES # BLD AUTO: 1.35 THOUSANDS/ΜL (ref 0.6–4.47)
LYMPHOCYTES NFR BLD AUTO: 18 % (ref 14–44)
MAGNESIUM SERPL-MCNC: 1.6 MG/DL (ref 1.6–2.6)
MCH RBC QN AUTO: 24 PG (ref 26.8–34.3)
MCH RBC QN AUTO: 25.1 PG (ref 26.8–34.3)
MCHC RBC AUTO-ENTMCNC: 29.7 G/DL (ref 31.4–37.4)
MCHC RBC AUTO-ENTMCNC: 30.2 G/DL (ref 31.4–37.4)
MCV RBC AUTO: 81 FL (ref 82–98)
MCV RBC AUTO: 83 FL (ref 82–98)
MONOCYTES # BLD AUTO: 0.79 THOUSAND/ΜL (ref 0.17–1.22)
MONOCYTES NFR BLD AUTO: 11 % (ref 4–12)
NEUTROPHILS # BLD AUTO: 5.06 THOUSANDS/ΜL (ref 1.85–7.62)
NEUTS SEG NFR BLD AUTO: 68 % (ref 43–75)
NRBC BLD AUTO-RTO: 0 /100 WBCS
PCO2 BLD: 25 MMOL/L (ref 21–32)
PCO2 BLD: 26 MMOL/L (ref 21–32)
PCO2 BLD: 30 MMOL/L (ref 21–32)
PCO2 BLD: 45.6 MM HG (ref 36–44)
PCO2 BLD: 48.6 MM HG (ref 36–44)
PCO2 BLD: 56.9 MM HG (ref 36–44)
PH BLD: 7.31 [PH] (ref 7.35–7.45)
PH BLD: 7.32 [PH] (ref 7.35–7.45)
PH BLD: 7.33 [PH] (ref 7.35–7.45)
PLATELET # BLD AUTO: 481 THOUSANDS/UL (ref 149–390)
PLATELET # BLD AUTO: 573 THOUSANDS/UL (ref 149–390)
PMV BLD AUTO: 10.3 FL (ref 8.9–12.7)
PMV BLD AUTO: 9.6 FL (ref 8.9–12.7)
PO2 BLD: 307 MM HG (ref 75–129)
PO2 BLD: 61 MM HG (ref 75–129)
PO2 BLD: 71 MM HG (ref 75–129)
POTASSIUM BLD-SCNC: 4.7 MMOL/L (ref 3.5–5.3)
POTASSIUM BLD-SCNC: 5 MMOL/L (ref 3.5–5.3)
POTASSIUM BLD-SCNC: 5.3 MMOL/L (ref 3.5–5.3)
POTASSIUM SERPL-SCNC: 5.3 MMOL/L (ref 3.5–5.3)
POTASSIUM SERPL-SCNC: 5.5 MMOL/L (ref 3.5–5.3)
RBC # BLD AUTO: 3.75 MILLION/UL (ref 3.81–5.12)
RBC # BLD AUTO: 3.84 MILLION/UL (ref 3.81–5.12)
SAO2 % BLD FROM PO2: 100 % (ref 60–85)
SAO2 % BLD FROM PO2: 89 % (ref 60–85)
SAO2 % BLD FROM PO2: 92 % (ref 60–85)
SODIUM BLD-SCNC: 135 MMOL/L (ref 136–145)
SODIUM BLD-SCNC: 136 MMOL/L (ref 136–145)
SODIUM BLD-SCNC: 137 MMOL/L (ref 136–145)
SODIUM SERPL-SCNC: 132 MMOL/L (ref 135–147)
SODIUM SERPL-SCNC: 136 MMOL/L (ref 135–147)
SPECIMEN SOURCE: ABNORMAL
UNIT DISPENSE STATUS: NORMAL
UNIT PRODUCT CODE: NORMAL
UNIT PRODUCT VOLUME: 350 ML
UNIT RH: NORMAL
WBC # BLD AUTO: 14.6 THOUSAND/UL (ref 4.31–10.16)
WBC # BLD AUTO: 7.44 THOUSAND/UL (ref 4.31–10.16)

## 2022-08-06 PROCEDURE — 87116 MYCOBACTERIA CULTURE: CPT | Performed by: THORACIC SURGERY (CARDIOTHORACIC VASCULAR SURGERY)

## 2022-08-06 PROCEDURE — 94760 N-INVAS EAR/PLS OXIMETRY 1: CPT

## 2022-08-06 PROCEDURE — 87176 TISSUE HOMOGENIZATION CULTR: CPT | Performed by: THORACIC SURGERY (CARDIOTHORACIC VASCULAR SURGERY)

## 2022-08-06 PROCEDURE — 88112 CYTOPATH CELL ENHANCE TECH: CPT | Performed by: PATHOLOGY

## 2022-08-06 PROCEDURE — 82948 REAGENT STRIP/BLOOD GLUCOSE: CPT

## 2022-08-06 PROCEDURE — 85384 FIBRINOGEN ACTIVITY: CPT

## 2022-08-06 PROCEDURE — 87206 SMEAR FLUORESCENT/ACID STAI: CPT | Performed by: THORACIC SURGERY (CARDIOTHORACIC VASCULAR SURGERY)

## 2022-08-06 PROCEDURE — 94640 AIRWAY INHALATION TREATMENT: CPT

## 2022-08-06 PROCEDURE — 84295 ASSAY OF SERUM SODIUM: CPT

## 2022-08-06 PROCEDURE — 87102 FUNGUS ISOLATION CULTURE: CPT | Performed by: THORACIC SURGERY (CARDIOTHORACIC VASCULAR SURGERY)

## 2022-08-06 PROCEDURE — 85014 HEMATOCRIT: CPT | Performed by: FAMILY MEDICINE

## 2022-08-06 PROCEDURE — 71045 X-RAY EXAM CHEST 1 VIEW: CPT

## 2022-08-06 PROCEDURE — 85730 THROMBOPLASTIN TIME PARTIAL: CPT | Performed by: FAMILY MEDICINE

## 2022-08-06 PROCEDURE — 3E0T3BZ INTRODUCTION OF ANESTHETIC AGENT INTO PERIPHERAL NERVES AND PLEXI, PERCUTANEOUS APPROACH: ICD-10-PCS | Performed by: THORACIC SURGERY (CARDIOTHORACIC VASCULAR SURGERY)

## 2022-08-06 PROCEDURE — 85018 HEMOGLOBIN: CPT | Performed by: FAMILY MEDICINE

## 2022-08-06 PROCEDURE — 32652 THORACOSCOPY REM TOTL CORTEX: CPT | Performed by: THORACIC SURGERY (CARDIOTHORACIC VASCULAR SURGERY)

## 2022-08-06 PROCEDURE — 83735 ASSAY OF MAGNESIUM: CPT | Performed by: SURGERY

## 2022-08-06 PROCEDURE — 87205 SMEAR GRAM STAIN: CPT | Performed by: THORACIC SURGERY (CARDIOTHORACIC VASCULAR SURGERY)

## 2022-08-06 PROCEDURE — 85347 COAGULATION TIME ACTIVATED: CPT

## 2022-08-06 PROCEDURE — C9290 INJ, BUPIVACAINE LIPOSOME: HCPCS | Performed by: THORACIC SURGERY (CARDIOTHORACIC VASCULAR SURGERY)

## 2022-08-06 PROCEDURE — 85397 CLOTTING FUNCT ACTIVITY: CPT

## 2022-08-06 PROCEDURE — 0B21XFZ CHANGE TRACHEOSTOMY DEVICE IN TRACHEA, EXTERNAL APPROACH: ICD-10-PCS | Performed by: THORACIC SURGERY (CARDIOTHORACIC VASCULAR SURGERY)

## 2022-08-06 PROCEDURE — 80048 BASIC METABOLIC PNL TOTAL CA: CPT | Performed by: FAMILY MEDICINE

## 2022-08-06 PROCEDURE — NC001 PR NO CHARGE: Performed by: THORACIC SURGERY (CARDIOTHORACIC VASCULAR SURGERY)

## 2022-08-06 PROCEDURE — 88305 TISSUE EXAM BY PATHOLOGIST: CPT | Performed by: PATHOLOGY

## 2022-08-06 PROCEDURE — 80048 BASIC METABOLIC PNL TOTAL CA: CPT | Performed by: SURGERY

## 2022-08-06 PROCEDURE — 82803 BLOOD GASES ANY COMBINATION: CPT

## 2022-08-06 PROCEDURE — 87070 CULTURE OTHR SPECIMN AEROBIC: CPT | Performed by: THORACIC SURGERY (CARDIOTHORACIC VASCULAR SURGERY)

## 2022-08-06 PROCEDURE — 0BJ08ZZ INSPECTION OF TRACHEOBRONCHIAL TREE, VIA NATURAL OR ARTIFICIAL OPENING ENDOSCOPIC: ICD-10-PCS | Performed by: THORACIC SURGERY (CARDIOTHORACIC VASCULAR SURGERY)

## 2022-08-06 PROCEDURE — 99232 SBSQ HOSP IP/OBS MODERATE 35: CPT | Performed by: FAMILY MEDICINE

## 2022-08-06 PROCEDURE — 85014 HEMATOCRIT: CPT

## 2022-08-06 PROCEDURE — 82947 ASSAY GLUCOSE BLOOD QUANT: CPT

## 2022-08-06 PROCEDURE — 85027 COMPLETE CBC AUTOMATED: CPT | Performed by: SURGERY

## 2022-08-06 PROCEDURE — 87077 CULTURE AEROBIC IDENTIFY: CPT | Performed by: THORACIC SURGERY (CARDIOTHORACIC VASCULAR SURGERY)

## 2022-08-06 PROCEDURE — 87181 SC STD AGAR DILUTION PER AGT: CPT | Performed by: THORACIC SURGERY (CARDIOTHORACIC VASCULAR SURGERY)

## 2022-08-06 PROCEDURE — 82330 ASSAY OF CALCIUM: CPT

## 2022-08-06 PROCEDURE — 87075 CULTR BACTERIA EXCEPT BLOOD: CPT | Performed by: THORACIC SURGERY (CARDIOTHORACIC VASCULAR SURGERY)

## 2022-08-06 PROCEDURE — 0BNK4ZZ RELEASE RIGHT LUNG, PERCUTANEOUS ENDOSCOPIC APPROACH: ICD-10-PCS | Performed by: THORACIC SURGERY (CARDIOTHORACIC VASCULAR SURGERY)

## 2022-08-06 PROCEDURE — 85576 BLOOD PLATELET AGGREGATION: CPT

## 2022-08-06 PROCEDURE — 85025 COMPLETE CBC W/AUTO DIFF WBC: CPT | Performed by: FAMILY MEDICINE

## 2022-08-06 PROCEDURE — P9016 RBC LEUKOCYTES REDUCED: HCPCS

## 2022-08-06 PROCEDURE — 84132 ASSAY OF SERUM POTASSIUM: CPT

## 2022-08-06 RX ORDER — ONDANSETRON 2 MG/ML
4 INJECTION INTRAMUSCULAR; INTRAVENOUS EVERY 6 HOURS PRN
Status: DISCONTINUED | OUTPATIENT
Start: 2022-08-06 | End: 2022-08-18 | Stop reason: HOSPADM

## 2022-08-06 RX ORDER — LIDOCAINE HYDROCHLORIDE 10 MG/ML
INJECTION, SOLUTION EPIDURAL; INFILTRATION; INTRACAUDAL; PERINEURAL AS NEEDED
Status: DISCONTINUED | OUTPATIENT
Start: 2022-08-06 | End: 2022-08-06

## 2022-08-06 RX ORDER — ROCURONIUM BROMIDE 10 MG/ML
INJECTION, SOLUTION INTRAVENOUS AS NEEDED
Status: DISCONTINUED | OUTPATIENT
Start: 2022-08-06 | End: 2022-08-06

## 2022-08-06 RX ORDER — ONDANSETRON 2 MG/ML
4 INJECTION INTRAMUSCULAR; INTRAVENOUS ONCE AS NEEDED
Status: DISCONTINUED | OUTPATIENT
Start: 2022-08-06 | End: 2022-08-06 | Stop reason: HOSPADM

## 2022-08-06 RX ORDER — SODIUM CHLORIDE, SODIUM LACTATE, POTASSIUM CHLORIDE, CALCIUM CHLORIDE 600; 310; 30; 20 MG/100ML; MG/100ML; MG/100ML; MG/100ML
INJECTION, SOLUTION INTRAVENOUS CONTINUOUS PRN
Status: DISCONTINUED | OUTPATIENT
Start: 2022-08-06 | End: 2022-08-06

## 2022-08-06 RX ORDER — INSULIN LISPRO 100 [IU]/ML
2-12 INJECTION, SOLUTION INTRAVENOUS; SUBCUTANEOUS
Status: DISCONTINUED | OUTPATIENT
Start: 2022-08-06 | End: 2022-08-09

## 2022-08-06 RX ORDER — FENTANYL CITRATE/PF 50 MCG/ML
25 SYRINGE (ML) INJECTION
Status: COMPLETED | OUTPATIENT
Start: 2022-08-06 | End: 2022-08-06

## 2022-08-06 RX ORDER — SODIUM CHLORIDE, SODIUM LACTATE, POTASSIUM CHLORIDE, CALCIUM CHLORIDE 600; 310; 30; 20 MG/100ML; MG/100ML; MG/100ML; MG/100ML
60 INJECTION, SOLUTION INTRAVENOUS CONTINUOUS
Status: DISCONTINUED | OUTPATIENT
Start: 2022-08-06 | End: 2022-08-08

## 2022-08-06 RX ORDER — HYDROMORPHONE HCL IN WATER/PF 6 MG/30 ML
0.2 PATIENT CONTROLLED ANALGESIA SYRINGE INTRAVENOUS
Status: DISCONTINUED | OUTPATIENT
Start: 2022-08-06 | End: 2022-08-06 | Stop reason: HOSPADM

## 2022-08-06 RX ORDER — EPHEDRINE SULFATE 50 MG/ML
INJECTION INTRAVENOUS AS NEEDED
Status: DISCONTINUED | OUTPATIENT
Start: 2022-08-06 | End: 2022-08-06

## 2022-08-06 RX ORDER — GABAPENTIN 300 MG/1
300 CAPSULE ORAL 3 TIMES DAILY
Status: DISCONTINUED | OUTPATIENT
Start: 2022-08-06 | End: 2022-08-07

## 2022-08-06 RX ORDER — MAGNESIUM SULFATE HEPTAHYDRATE 40 MG/ML
2 INJECTION, SOLUTION INTRAVENOUS ONCE
Status: COMPLETED | OUTPATIENT
Start: 2022-08-06 | End: 2022-08-06

## 2022-08-06 RX ORDER — POLYETHYLENE GLYCOL 3350 17 G/17G
17 POWDER, FOR SOLUTION ORAL DAILY PRN
Status: DISCONTINUED | OUTPATIENT
Start: 2022-08-06 | End: 2022-08-18 | Stop reason: HOSPADM

## 2022-08-06 RX ORDER — ONDANSETRON 2 MG/ML
INJECTION INTRAMUSCULAR; INTRAVENOUS AS NEEDED
Status: DISCONTINUED | OUTPATIENT
Start: 2022-08-06 | End: 2022-08-06

## 2022-08-06 RX ORDER — OXYCODONE HYDROCHLORIDE 5 MG/1
2.5 TABLET ORAL EVERY 4 HOURS PRN
Status: DISCONTINUED | OUTPATIENT
Start: 2022-08-06 | End: 2022-08-06

## 2022-08-06 RX ORDER — DEXAMETHASONE SODIUM PHOSPHATE 10 MG/ML
INJECTION, SOLUTION INTRAMUSCULAR; INTRAVENOUS AS NEEDED
Status: DISCONTINUED | OUTPATIENT
Start: 2022-08-06 | End: 2022-08-06

## 2022-08-06 RX ORDER — CALCIUM CHLORIDE 100 MG/ML
INJECTION INTRAVENOUS; INTRAVENTRICULAR AS NEEDED
Status: DISCONTINUED | OUTPATIENT
Start: 2022-08-06 | End: 2022-08-06

## 2022-08-06 RX ORDER — PANTOPRAZOLE SODIUM 40 MG/10ML
40 INJECTION, POWDER, LYOPHILIZED, FOR SOLUTION INTRAVENOUS
Status: DISCONTINUED | OUTPATIENT
Start: 2022-08-07 | End: 2022-08-07

## 2022-08-06 RX ORDER — PROPOFOL 10 MG/ML
INJECTION, EMULSION INTRAVENOUS AS NEEDED
Status: DISCONTINUED | OUTPATIENT
Start: 2022-08-06 | End: 2022-08-06

## 2022-08-06 RX ORDER — SODIUM CHLORIDE 9 MG/ML
INJECTION, SOLUTION INTRAVENOUS CONTINUOUS PRN
Status: DISCONTINUED | OUTPATIENT
Start: 2022-08-06 | End: 2022-08-06

## 2022-08-06 RX ORDER — SODIUM CHLORIDE 9 MG/ML
INJECTION, SOLUTION INTRAVENOUS AS NEEDED
Status: DISCONTINUED | OUTPATIENT
Start: 2022-08-06 | End: 2022-08-06 | Stop reason: HOSPADM

## 2022-08-06 RX ORDER — MIDAZOLAM HYDROCHLORIDE 2 MG/2ML
INJECTION, SOLUTION INTRAMUSCULAR; INTRAVENOUS AS NEEDED
Status: DISCONTINUED | OUTPATIENT
Start: 2022-08-06 | End: 2022-08-06

## 2022-08-06 RX ORDER — HYDROMORPHONE HCL/PF 1 MG/ML
0.5 SYRINGE (ML) INJECTION
Status: DISCONTINUED | OUTPATIENT
Start: 2022-08-06 | End: 2022-08-06

## 2022-08-06 RX ORDER — SENNOSIDES 8.6 MG
1 TABLET ORAL DAILY
Status: DISCONTINUED | OUTPATIENT
Start: 2022-08-06 | End: 2022-08-18 | Stop reason: HOSPADM

## 2022-08-06 RX ORDER — BUPIVACAINE HYDROCHLORIDE 2.5 MG/ML
INJECTION, SOLUTION EPIDURAL; INFILTRATION; INTRACAUDAL AS NEEDED
Status: DISCONTINUED | OUTPATIENT
Start: 2022-08-06 | End: 2022-08-06 | Stop reason: HOSPADM

## 2022-08-06 RX ORDER — FENTANYL CITRATE 50 UG/ML
INJECTION, SOLUTION INTRAMUSCULAR; INTRAVENOUS AS NEEDED
Status: DISCONTINUED | OUTPATIENT
Start: 2022-08-06 | End: 2022-08-06

## 2022-08-06 RX ORDER — INSULIN GLARGINE 100 [IU]/ML
30 INJECTION, SOLUTION SUBCUTANEOUS
Status: DISCONTINUED | OUTPATIENT
Start: 2022-08-06 | End: 2022-08-07

## 2022-08-06 RX ORDER — ACETAMINOPHEN 325 MG/1
975 TABLET ORAL EVERY 6 HOURS
Status: DISCONTINUED | OUTPATIENT
Start: 2022-08-06 | End: 2022-08-14

## 2022-08-06 RX ORDER — OXYCODONE HYDROCHLORIDE 5 MG/1
5 TABLET ORAL EVERY 4 HOURS PRN
Status: DISCONTINUED | OUTPATIENT
Start: 2022-08-06 | End: 2022-08-06

## 2022-08-06 RX ADMIN — MIDAZOLAM 1 MG: 1 INJECTION INTRAMUSCULAR; INTRAVENOUS at 08:16

## 2022-08-06 RX ADMIN — PHENYLEPHRINE HYDROCHLORIDE 50 MCG/MIN: 10 INJECTION INTRAVENOUS at 08:48

## 2022-08-06 RX ADMIN — PREGABALIN 25 MG: 25 CAPSULE ORAL at 16:46

## 2022-08-06 RX ADMIN — LEVALBUTEROL HYDROCHLORIDE 1.25 MG: 1.25 SOLUTION, CONCENTRATE RESPIRATORY (INHALATION) at 19:25

## 2022-08-06 RX ADMIN — HYDROMORPHONE HYDROCHLORIDE 4 MG: 2 TABLET ORAL at 21:25

## 2022-08-06 RX ADMIN — SODIUM CHLORIDE, SODIUM LACTATE, POTASSIUM CHLORIDE, AND CALCIUM CHLORIDE: .6; .31; .03; .02 INJECTION, SOLUTION INTRAVENOUS at 09:37

## 2022-08-06 RX ADMIN — SODIUM CHLORIDE: 0.9 INJECTION, SOLUTION INTRAVENOUS at 10:52

## 2022-08-06 RX ADMIN — GABAPENTIN 300 MG: 300 CAPSULE ORAL at 21:21

## 2022-08-06 RX ADMIN — INSULIN LISPRO 6 UNITS: 100 INJECTION, SOLUTION INTRAVENOUS; SUBCUTANEOUS at 16:51

## 2022-08-06 RX ADMIN — ROCURONIUM BROMIDE 50 MG: 50 INJECTION, SOLUTION INTRAVENOUS at 09:31

## 2022-08-06 RX ADMIN — FENTANYL CITRATE 25 MCG: 50 INJECTION INTRAMUSCULAR; INTRAVENOUS at 09:43

## 2022-08-06 RX ADMIN — SUGAMMADEX 200 MG: 100 INJECTION, SOLUTION INTRAVENOUS at 11:52

## 2022-08-06 RX ADMIN — METOPROLOL SUCCINATE 50 MG: 50 TABLET, EXTENDED RELEASE ORAL at 06:31

## 2022-08-06 RX ADMIN — FENTANYL CITRATE 25 MCG: 50 INJECTION INTRAMUSCULAR; INTRAVENOUS at 08:21

## 2022-08-06 RX ADMIN — Medication 25 MCG: at 13:44

## 2022-08-06 RX ADMIN — MIDAZOLAM 1 MG: 1 INJECTION INTRAMUSCULAR; INTRAVENOUS at 08:21

## 2022-08-06 RX ADMIN — Medication 10 MG: at 09:39

## 2022-08-06 RX ADMIN — DEXAMETHASONE SODIUM PHOSPHATE 5 MG: 10 INJECTION, SOLUTION INTRAMUSCULAR; INTRAVENOUS at 09:46

## 2022-08-06 RX ADMIN — MAGNESIUM SULFATE HEPTAHYDRATE 2 G: 40 INJECTION, SOLUTION INTRAVENOUS at 16:44

## 2022-08-06 RX ADMIN — METOPROLOL SUCCINATE 50 MG: 50 TABLET, EXTENDED RELEASE ORAL at 16:50

## 2022-08-06 RX ADMIN — Medication 10 MG: at 11:23

## 2022-08-06 RX ADMIN — GABAPENTIN 300 MG: 300 CAPSULE ORAL at 16:44

## 2022-08-06 RX ADMIN — ACETAMINOPHEN 975 MG: 325 TABLET ORAL at 06:31

## 2022-08-06 RX ADMIN — ROCURONIUM BROMIDE 50 MG: 50 INJECTION, SOLUTION INTRAVENOUS at 08:43

## 2022-08-06 RX ADMIN — IPRATROPIUM BROMIDE 0.5 MG: 0.5 SOLUTION RESPIRATORY (INHALATION) at 19:25

## 2022-08-06 RX ADMIN — ONDANSETRON HYDROCHLORIDE 4 MG: 2 INJECTION, SOLUTION INTRAMUSCULAR; INTRAVENOUS at 11:32

## 2022-08-06 RX ADMIN — INSULIN LISPRO 2 UNITS: 100 INJECTION, SOLUTION INTRAVENOUS; SUBCUTANEOUS at 06:31

## 2022-08-06 RX ADMIN — VANCOMYCIN HYDROCHLORIDE 1250 MG: 10 INJECTION, POWDER, LYOPHILIZED, FOR SOLUTION INTRAVENOUS at 21:22

## 2022-08-06 RX ADMIN — HYDROMORPHONE HYDROCHLORIDE 2 MG: 2 TABLET ORAL at 06:47

## 2022-08-06 RX ADMIN — PROPOFOL 100 MG: 10 INJECTION, EMULSION INTRAVENOUS at 08:43

## 2022-08-06 RX ADMIN — ATORVASTATIN CALCIUM 40 MG: 40 TABLET, FILM COATED ORAL at 16:42

## 2022-08-06 RX ADMIN — PANTOPRAZOLE SODIUM 40 MG: 40 TABLET, DELAYED RELEASE ORAL at 06:31

## 2022-08-06 RX ADMIN — CEFAZOLIN SODIUM 1000 MG: 1 SOLUTION INTRAVENOUS at 09:18

## 2022-08-06 RX ADMIN — ACETAMINOPHEN 975 MG: 325 TABLET ORAL at 16:49

## 2022-08-06 RX ADMIN — LIDOCAINE HYDROCHLORIDE 50 MG: 10 INJECTION, SOLUTION EPIDURAL; INFILTRATION; INTRACAUDAL; PERINEURAL at 08:43

## 2022-08-06 RX ADMIN — Medication 10 MG: at 10:46

## 2022-08-06 RX ADMIN — SENNOSIDES AND DOCUSATE SODIUM 1 TABLET: 50; 8.6 TABLET ORAL at 16:47

## 2022-08-06 RX ADMIN — EPHEDRINE SULFATE 5 MG: 50 INJECTION INTRAVENOUS at 11:02

## 2022-08-06 RX ADMIN — SODIUM CHLORIDE: 0.9 INJECTION, SOLUTION INTRAVENOUS at 09:18

## 2022-08-06 RX ADMIN — EPHEDRINE SULFATE 10 MG: 50 INJECTION INTRAVENOUS at 11:37

## 2022-08-06 RX ADMIN — INSULIN LISPRO 4 UNITS: 100 INJECTION, SOLUTION INTRAVENOUS; SUBCUTANEOUS at 21:21

## 2022-08-06 RX ADMIN — CALCIUM CHLORIDE 0.5 G: 100 INJECTION INTRAVENOUS; INTRAVENTRICULAR at 11:10

## 2022-08-06 RX ADMIN — Medication 25 MCG: at 13:26

## 2022-08-06 RX ADMIN — EPHEDRINE SULFATE 5 MG: 50 INJECTION INTRAVENOUS at 10:53

## 2022-08-06 RX ADMIN — INSULIN GLARGINE 30 UNITS: 100 INJECTION, SOLUTION SUBCUTANEOUS at 21:23

## 2022-08-06 RX ADMIN — BUMETANIDE 2 MG: 2 TABLET ORAL at 16:42

## 2022-08-06 RX ADMIN — Medication 20 MG: at 08:43

## 2022-08-06 RX ADMIN — HYDROMORPHONE HYDROCHLORIDE 2 MG: 2 TABLET ORAL at 16:44

## 2022-08-06 NOTE — PHYSICAL THERAPY NOTE
Physical Therapy Cancellation Note    Patient's Name: Toño Gallegos       08/06/22 9076   PT Last Visit   PT Visit Date 08/06/22   Note Type   Note type Evaluation; Cancelled Session   Cancel Reasons Patient to operating room       Orders received, chart reviewed  Pt admit with right lung empyema  Pt currently off the floor in OR for VATs procedure  Will hold PT at this time and follow for evaluation/treatment as medically appropriate  Thank you       Christophe Mistry, PT, DPT

## 2022-08-06 NOTE — PROGRESS NOTES
Vancomycin IV Pharmacy-to-Dose Consultation    Benjamín Mejias is a 65 y o  female who is currently receiving Vancomycin IV with management by the Pharmacy Consult service  Assessment/Plan:  The patient was reviewed  Renal function is stable and no signs or symptoms of nephrotoxicity and/or infusion reactions were documented in the chart  Trough yesterday was therapeutic at 18 0    Based on todays assessment, continue current vancomycin (day # 6) dosing of 1250 mg q24h, with a plan for trough to be drawn at 1330 on 8/10  We will continue to follow the patients culture results and clinical progress daily      Marvin Iverson PharmD  Emergency Medicine Clinical Pharmacist    or Reji

## 2022-08-06 NOTE — PROGRESS NOTES
[facility]  Progress Note - Pete Cervantes 1965, 65 y o  female MRN: [id number]  Unit/Bed#: OR [place] Encounter: [id number]  Primary Care Provider: Joey Paz MD   Date and time admitted to hospital: 8/2/2022  8:31 PM    * Empyema lung, Right  Assessment & Plan  · Loculated right pleural effusion  S/P right-sided chest tube insertion and removal on 07/24  · CT of the chest on 7/28/22 revealed moderate partially loculated right pleural effusion  · Right chest tube re-placed on 7/29/22 by IR  · Pleural fluid analysis shows neutrophil predominant WBC count  · Fluid cultures from prior anterior chest tube site positive for MRSA  · 8/1 - CXR: Small component of right pleural effusion suspected which may be partially loculated  Indwelling right thoracostomy tube without pneumothorax  · 8/2 CT Chest:  Decreased size of a right-sided pleural effusion and improved aeration of the right lower lobe post placement of a right pleural drainage catheter, with unchanged appearance of a loculated component of the effusion superomedially  There is slightly  increased density of the effusion compatible with a small amount of blood products  2   Appearance of new inflammatory groundglass opacities in the left lower lobe  3   Mild background interstitial edema is unchanged    Plan:  - Continue Abx w/ Vanc given MRSA, Vanc will need to be continued through 8/16 per ID  - Plans for VATS/Washout/Decortation  on 8/6   - NPO at midnight    - Maintain CT to suction and monitor output  - Monitor fever curve and white count closely    Acute on chronic heart failure with preserved ejection fraction (HCC)  Assessment & Plan  Wt Readings from Last 3 Encounters:   08/06/22 79 8 kg (176 lb)   08/02/22 77 7 kg (171 lb 6 4 oz)   07/19/22 78 2 kg (172 lb 6 4 oz)     · Initially admitted to [facility] on 07/15 with CHF exacerbation, now optimized  · Most recent EF 50% with normal systolic and diastolic function on 2/24/2022  Currently stable and euvolemic  · On Bumex 2 mg BID with Aldactone 100 mg daily - restarted on 7/29  · Continue low-salt diet, strict I's and O's monitoring  · Per Cardiology at [facility] upon discharge  · Will need follow-up with General Cardiology & EP service upon discharge for ICD consideration    Chest wall wound infection  Assessment & Plan  · Purulent discharge noted at site of recent chest tube placement  · Cultures positive for MRSA on 07/28 susceptible to vanc  · Currently on IV vancomycin - will continue  · Continue local wound care/dressing changes     Pain at Chest tube insertion site  Assessment & Plan  · Continues with intractable pain at prior chest tube site on right anterior chest wall  This was removed 7/24/22  · Was followed by APS back in [facility], S/P epidural catheter which was removed 8/2  · Current pain regimen: Diaz Tylenol 975 Q8h, PO Dilaudid 2/4 for mod/severe, IV Dilaudid 0 5 mg Q2  · Bowel regimen on board:  Scheduled senna and MiraLax daily  · Plan to discontinue IV Dilaudid when Chest tube removed  Acute Respiratory insufficiency on chronic hypoxic respiratory failure  Assessment & Plan  · She is status post trach  Currently on 6-8L with sats in the high 90s, at home uses 10 L  · Status post recent right pneumothorax requiring chest tube and now with right empyema  · Continue aggressive respiratory protocol, p r n  suctioning  · Continue Xoponex and Atrovent per Pulm q6hrs  · Encourage out of bed, incentive spirometry  · Pulmonology following      CKD (chronic kidney disease) stage 3, GFR 30-59 ml/min ([facility])  Assessment & Plan  · MODESTO noted on initial admission to [facility] with elevated Cr 1 64, now resolved  · Baseline Cr being 0 9-1 0  · Continue to monitor closely   Losartan on hold  · Avoid nephrotoxic agents     Anemia  Assessment & Plan    Results from last 7 days   Lab Units 08/06/22  1220 08/06/22  0415 08/05/22  0861 08/04/22  0454 08/03/22  0559 08/02/22  0634 08/01/22 2026   HEMOGLOBIN g/dL 9 4* 9 2* 7 2* 6 8* 7 9* 8 2* 7 7*   HEMATOCRIT % 31 1* 31 0* 24 2* 23 5* 27 4* 27 4* 25 7*     Most likely anemia of chronic disease due to prolonged illness  No active bleeding noted  Hg (8/4): 6 8 s/p 1 I unit PRBC's   Hg (8/5): 7 2  Will give additional 1 unit to optimize for upcoming surgery  Hb stable, monitor closely  Consider transfusing if < 8 due to history of CAD  Continue Iron replacement as well as daily miralax     Tracheostomy in place [facility])  Assessment & Plan  · Trach placed in the context of cardiac arrest/prolonged ventilator dependent state November 2021  · Decannulated at [facility] 12/11/21  · Patient requires frequent tracheostomy changes and suctioning  · Per discussion with speech therapy,  video barium swallow was done for sense of food getting stuck  Appropriate for regular diet  · On trach collar O2 with humidification and tolerating well btw 6-8L     CAD (coronary artery disease)  Assessment & Plan  · STEMI 10/22/2021 s/p PATRICA mid circumflex OM1  OM2 was ballooned but not stented    · Pulseless VT 10/27/21 - repeat C 90% mid circumflex stenosis, but could not be intervened on  · VT 10/28/21 LHC:  found to have apical LAD complete occlusion was felt to be small to be intervened    · Cardiac carrest 1/1/22 - [facility] felt to be hypoxic vs  VT induced  · On metoprolol 50mg BID, Brilinta 90 mg BID and Lipitor 40 mg qd     A-fib [facility])  Assessment & Plan  Follows closely with Cardiology  Rhythm controlled with amiodarone 100 mg daily and AC with Eliquis 5 mg BID  Eliquis on Hold per cardiology, for upcoming VATS procedure (Today)    Type 2 diabetes mellitus with hyperglycemia [facility])  Assessment & Plan  Lab Results   Component Value Date    HGBA1C 12 7 (H) 05/22/2022       Recent Labs     08/05/22  1627 08/05/22 2039 08/06/22  0625 08/06/22  1219   POCGLU 167* 157* 169* 223*     Uncontrolled per most recent A1c  Home regimen: Lantus 16U QHS and Novolog 4U TID  Current inpatient regimen: Lantus 30U qHS + Humalog 20U TID + SSI 4  Patietn NPO at midnight, will adjust insulin Regimen: Lantus 15U, Humalog 20U TID with meals and SSI  Will continue for now and adjust as needed to maintain  - 180  Readjust lantus s/p VATS    Hypertension  Assessment & Plan  BP stable  On home losartan 50 mg BID, follows closely with Cardiology  Losartan on hold but consider restarting as MODESTO has now resolved  Monitor BP closely     Hyperlipidemia associated with type 2 diabetes mellitus (HCC)  Assessment & Plan  - Continue Atorvastatin 40 mg daily  Diabetic peripheral neuropathy (HCC)  Assessment & Plan  Continue PTA Lyrica as documented     Anxiety  Assessment & Plan  On Lexapro 10 mg daily as well as Atarax prn         PPX: heparin but held for VATS  Diet: regular house + dietary supp (was NPO for VATS)  Code Status: level 1  Dispo: s/p VATS and infection clearance per thoracics    Plan D/W Dr Hodges and [facility] Team    Subjective:   Patient was briefly seen in the morning  Patient is NPO for VATS procedure  Will follow up with the patient in PACU or on the floor after her procedure  Objective:     Vitals: Blood pressure 134/75, pulse (!) 54, temperature (!) 97 1 °F (36 2 °C), temperature source Temporal, resp  rate 22, weight 79 8 kg (176 lb), SpO2 91 %  ,Body mass index is 25 99 kg/m²  Intake/Output Summary (Last 24 hours) at 8/6/2022 1236  Last data filed at 8/6/2022 1205  Gross per 24 hour   Intake 3584 29 ml   Output 3600 ml   Net -15 71 ml       Physical Exam:   Physical Exam  Vitals reviewed  Constitutional:       General: She is not in acute distress  Appearance: She is not diaphoretic  HENT:      Head: Normocephalic and atraumatic  Mouth/Throat:      Mouth: Mucous membranes are dry  Eyes:      General: No scleral icterus    Neck:      Comments: Trach site/cannula with humidified O2  Cardiovascular:      Rate and Rhythm: Normal rate and regular rhythm  Pulses: Normal pulses  Heart sounds: Normal heart sounds  No murmur heard  Pulmonary:      Effort: Pulmonary effort is normal       Breath sounds: No wheezing  Comments: Bilateral lower lung fields are diminished  Abdominal:      Palpations: Abdomen is soft  Tenderness: There is no abdominal tenderness  Musculoskeletal:      Cervical back: Neck supple  Right lower leg: No edema  Left lower leg: No edema  Skin:     General: Skin is warm and dry  Capillary Refill: Capillary refill takes less than 2 seconds  Neurological:      General: No focal deficit present  Mental Status: She is alert and oriented to person, place, and time  Psychiatric:         Mood and Affect: Mood normal          Invasive Devices  Report    Peripheral Intravenous Line  Duration           Peripheral IV 08/04/22 Proximal;Right;Ventral (anterior) Forearm 2 days    Peripheral IV 08/06/22 Left Arm <1 day    Peripheral IV 08/06/22 Right Hand <1 day          Arterial Line  Duration           Arterial Line 08/06/22 Right Brachial <1 day          Drain  Duration           Chest Tube 1 Right Pleural 28 Fr  <1 day    Chest Tube 2 Right Pleural 28 Fr  <1 day    Urethral Catheter Latex 16 Fr  <1 day          Airway  Duration           Surgical Airway Shiley Fenestrated 157 days                           Lab and other studies:  I have personally reviewed pertinent reports       Admission on 08/02/2022   Component Date Value    Prealbumin 08/01/2022 15 0 (A)    Sodium 08/03/2022 132 (A)    Potassium 08/03/2022 3 7     Chloride 08/03/2022 101     CO2 08/03/2022 26     ANION GAP 08/03/2022 5     BUN 08/03/2022 39 (A)    Creatinine 08/03/2022 1 34 (A)    Glucose 08/03/2022 84     Calcium 08/03/2022 8 9     Corrected Calcium 08/03/2022 10 7 (A)    AST 08/03/2022 33     ALT 08/03/2022 18     Alkaline Phosphatase 08/03/2022 81     Total Protein 08/03/2022 8 3     Albumin 08/03/2022 1 8 (A)    Total Bilirubin 08/03/2022 0 27     eGFR 08/03/2022 44     WBC 08/03/2022 9 84     RBC 08/03/2022 3 52 (A)    Hemoglobin 08/03/2022 7 9 (A)    Hematocrit 08/03/2022 27 4 (A)    MCV 08/03/2022 78 (A)    MCH 08/03/2022 22 4 (A)    MCHC 08/03/2022 28 8 (A)    RDW 08/03/2022 18 1 (A)    MPV 08/03/2022 9 9     Platelets 66/57/0039 611 (A)    Procalcitonin 08/03/2022 0 46 (A)    POC Glucose 08/03/2022 98     POC Glucose 08/03/2022 134     POC Glucose 08/03/2022 199 (A)    POC Glucose 08/03/2022 255 (A)    POC Glucose 08/03/2022 260 (A)    POC Glucose 08/03/2022 180 (A)    WBC 08/04/2022 7 56     RBC 08/04/2022 3 03 (A)    Hemoglobin 08/04/2022 6 8 (A)    Hematocrit 08/04/2022 23 5 (A)    MCV 08/04/2022 78 (A)    MCH 08/04/2022 22 4 (A)    MCHC 08/04/2022 28 9 (A)    RDW 08/04/2022 17 8 (A)    MPV 08/04/2022 10 0     Platelets 65/10/1960 535 (A)    nRBC 08/04/2022 0     Sodium 08/04/2022 133 (A)    Potassium 08/04/2022 4 2     Chloride 08/04/2022 99     CO2 08/04/2022 26     ANION GAP 08/04/2022 8     BUN 08/04/2022 42 (A)    Creatinine 08/04/2022 1 61 (A)    Glucose 08/04/2022 139     Calcium 08/04/2022 8 2 (A)    eGFR 08/04/2022 35     ABO Grouping 08/04/2022 O     Rh Factor 08/04/2022 Positive     Antibody Screen 08/04/2022 Negative     Specimen Expiration Date 08/04/2022 [id number]     Unit Product Code 08/05/2022 V3916G29     Unit Number 08/05/2022 [id number]     Unit ABO 08/05/2022 O     Unit RH 08/05/2022 POS     Crossmatch 08/05/2022 Compatible     Unit Dispense Status 08/05/2022 Presumed Trans     Unit Product Volume 08/05/2022 350     POC Glucose 08/04/2022 148 (A)    POC Glucose 08/04/2022 185 (A)    Vancomycin Tr 08/04/2022 17 5     PTT 08/04/2022 33     Protime 08/04/2022 16 2 (A)    INR 08/04/2022 1 28 (A)    POC Glucose 08/04/2022 113     POC Glucose 08/04/2022 79     POC Glucose 08/04/2022 87     PTT 08/05/2022 62 (A)    Sodium 08/05/2022 133 (A)    Potassium 08/05/2022 4 7     Chloride 08/05/2022 102     CO2 08/05/2022 28     ANION GAP 08/05/2022 3 (A)    BUN 08/05/2022 40 (A)    Creatinine 08/05/2022 1 49 (A)    Glucose 08/05/2022 123     Calcium 08/05/2022 7 8 (A)    eGFR 08/05/2022 38     WBC 08/05/2022 7 01     RBC 08/05/2022 3 07 (A)    Hemoglobin 08/05/2022 7 2 (A)    Hematocrit 08/05/2022 24 2 (A)    MCV 08/05/2022 79 (A)    MCH 08/05/2022 23 5 (A)    MCHC 08/05/2022 29 8 (A)    RDW 08/05/2022 17 7 (A)    Platelets 72/45/8985 499 (A)    MPV 08/05/2022 9 9     PTT 08/05/2022 43 (A)    POC Glucose 08/05/2022 172 (A)    Unit Product Code 08/06/2022 B2796Z93     Unit Number 08/06/2022 [id number]     Unit ABO 08/06/2022 O     Unit RH 08/06/2022 POS     Crossmatch 08/06/2022 Compatible     Unit Dispense Status 08/06/2022 Presumed Trans     Unit Product Volume 08/06/2022 350     POC Glucose 08/05/2022 261 (A)    Vancomycin Tr 08/05/2022 18 0     POC Glucose 08/05/2022 167 (A)    PTT 08/05/2022 82 (A)    POC Glucose 08/05/2022 157 (A)    PTT 08/06/2022 37     Sodium 08/06/2022 132 (A)    Potassium 08/06/2022 5 3     Chloride 08/06/2022 101     CO2 08/06/2022 28     ANION GAP 08/06/2022 3 (A)    BUN 08/06/2022 41 (A)    Creatinine 08/06/2022 1 68 (A)    Glucose 08/06/2022 143 (A)    Calcium 08/06/2022 8 5     eGFR 08/06/2022 33     WBC 08/06/2022 7 44     RBC 08/06/2022 3 84     Hemoglobin 08/06/2022 9 2 (A)    Hematocrit 08/06/2022 31 0 (A)    MCV 08/06/2022 81 (A)    MCH 08/06/2022 24 0 (A)    MCHC 08/06/2022 29 7 (A)    RDW 08/06/2022 18 6 (A)    MPV 08/06/2022 10 3     Platelets 05/15/1169 573 (A)    nRBC 08/06/2022 0     Neutrophils Relative 08/06/2022 68     Immat GRANS % 08/06/2022 2     Lymphocytes Relative 08/06/2022 18     Monocytes Relative 08/06/2022 11     Eosinophils Relative 08/06/2022 1     Basophils Relative 08/06/2022 0     Neutrophils Absolute 08/06/2022 5 06     Immature Grans Absolute 08/06/2022 0 14     Lymphocytes Absolute 08/06/2022 1 35     Monocytes Absolute 08/06/2022 0 79     Eosinophils Absolute 08/06/2022 0 08     Basophils Absolute 08/06/2022 0 02     Unit Product Code 08/06/2022 P9283G82     Unit Number 08/06/2022 [id number]     Unit ABO 08/06/2022 O     Unit RH 08/06/2022 POS     Crossmatch 08/06/2022 Compatible     Unit Dispense Status 08/06/2022 Issued     Unit Product Volume 08/06/2022 350     Unit Product Code 08/06/2022 A5469H84     Unit Number 08/06/2022 [id number]     Unit ABO 08/06/2022 O     Unit RH 08/06/2022 POS     Crossmatch 08/06/2022 Compatible     Unit Dispense Status 08/06/2022 Issued     Unit Product Volume 08/06/2022 350     POC Glucose 08/06/2022 169 (A)    CKR(REACTION TIME) 08/06/2022 6 2     CKLY30 08/06/2022 0 4     CRTMA(RAPIDTEG MAX AMPLI* 08/06/2022 >75 (A)    CFFMA (FUNCTIONAL FIBRIN* 08/06/2022 >52 (A)    WBC 08/06/2022 14 60 (A)    RBC 08/06/2022 3 75 (A)    Hemoglobin 08/06/2022 9 4 (A)    Hematocrit 08/06/2022 31 1 (A)    MCV 08/06/2022 83     MCH 08/06/2022 25 1 (A)    MCHC 08/06/2022 30 2 (A)    RDW 08/06/2022 18 5 (A)    Platelets 64/37/2023 481 (A)    MPV 08/06/2022 9 6     POC Glucose 08/06/2022 223 (A)       Recent Results (from the past 24 hour(s))   Fingerstick Glucose (POCT)    Collection Time: 08/05/22  4:27 PM   Result Value Ref Range    POC Glucose 167 (H) 65 - 140 mg/dl   Vancomycin, trough Please draw peripherally and do not hold next dose for results  Any questions please call pharmacy (pharmacy consult)   Thank you    Collection Time: 08/05/22  4:35 PM   Result Value Ref Range    Vancomycin Tr 18 0 10 0 - 20 0 ug/mL   APTT    Collection Time: 08/05/22  8:29 PM   Result Value Ref Range    PTT 82 (H) 23 - 37 seconds   Fingerstick Glucose (POCT)    Collection Time: 08/05/22  8:39 PM   Result Value Ref Range    POC Glucose 157 (H) 65 - 140 mg/dl   APTT    Collection Time: 08/06/22  4:15 AM   Result Value Ref Range    PTT 37 23 - 37 seconds   Basic metabolic panel    Collection Time: 08/06/22  4:15 AM   Result Value Ref Range    Sodium 132 (L) 135 - 147 mmol/L    Potassium 5 3 3 5 - 5 3 mmol/L    Chloride 101 96 - 108 mmol/L    CO2 28 21 - 32 mmol/L    ANION GAP 3 (L) 4 - 13 mmol/L    BUN 41 (H) 5 - 25 mg/dL    Creatinine 1 68 (H) 0 60 - 1 30 mg/dL    Glucose 143 (H) 65 - 140 mg/dL    Calcium 8 5 8 3 - 10 1 mg/dL    eGFR 33 ml/min/1 73sq m   CBC and differential    Collection Time: 08/06/22  4:15 AM   Result Value Ref Range    WBC 7 44 4 31 - 10 16 Thousand/uL    RBC 3 84 3 81 - 5 12 Million/uL    Hemoglobin 9 2 (L) 11 5 - 15 4 g/dL    Hematocrit 31 0 (L) 34 8 - 46 1 %    MCV 81 (L) 82 - 98 fL    MCH 24 0 (L) 26 8 - 34 3 pg    MCHC 29 7 (L) 31 4 - 37 4 g/dL    RDW 18 6 (H) 11 6 - 15 1 %    MPV 10 3 8 9 - 12 7 fL    Platelets 044 (H) 211 - 390 Thousands/uL    nRBC 0 /100 WBCs    Neutrophils Relative 68 43 - 75 %    Immat GRANS % 2 0 - 2 %    Lymphocytes Relative 18 14 - 44 %    Monocytes Relative 11 4 - 12 %    Eosinophils Relative 1 0 - 6 %    Basophils Relative 0 0 - 1 %    Neutrophils Absolute 5 06 1 85 - 7 62 Thousands/µL    Immature Grans Absolute 0 14 0 00 - 0 20 Thousand/uL    Lymphocytes Absolute 1 35 0 60 - 4 47 Thousands/µL    Monocytes Absolute 0 79 0 17 - 1 22 Thousand/µL    Eosinophils Absolute 0 08 0 00 - 0 61 Thousand/µL    Basophils Absolute 0 02 0 00 - 0 10 Thousands/µL   Prepare Leukoreduced RBC: 1 Units    Collection Time: 08/06/22  5:55 AM   Result Value Ref Range    Unit Product Code R9983A96     Unit Number [id number]     Unit ABO O     Unit RH POS     Crossmatch Compatible     Unit Dispense Status Presumed Trans     Unit Product Volume 350 mL   Fingerstick Glucose (POCT)    Collection Time: 08/06/22  6:25 AM   Result Value Ref Range    POC Glucose 169 (H) 65 - 140 mg/dl   Prepare Leukoreduced RBC: 2 Units    Collection Time: 08/06/22 10:27 AM   Result Value Ref Range    Unit Product Code U3896X56     Unit Number [id number]     Unit ABO O     Unit [facility] POS     Crossmatch Compatible     Unit Dispense Status Issued     Unit Product Volume 350 ml    Unit Product Code W0323N68     Unit Number [id number]     Unit ABO O     Unit [facility] POS     Crossmatch Compatible     Unit Dispense Status Issued     Unit Product Volume 350 ml   TEG 6 Global Hemostasis with Lysis    Collection Time: 08/06/22 11:16 AM   Result Value Ref Range    CKR(REACTION TIME) 6 2 4 6 - 9 1 Min    CKLY30 0 4 0 0 - 2 6 %    CRTMA(RAPIDTEG MAX AMPLITUDE) >75 (H) 52 - 70 mm    CFFMA (FUNCTIONAL FIBRINOGEN MAX AMPLITUDE) >52 (H) 15 - 32 mm   Fingerstick Glucose (POCT)    Collection Time: 08/06/22 12:19 PM   Result Value Ref Range    POC Glucose 223 (H) 65 - 140 mg/dl   CBC    Collection Time: 08/06/22 12:20 PM   Result Value Ref Range    WBC 14 60 (H) 4 31 - 10 16 Thousand/uL    RBC 3 75 (L) 3 81 - 5 12 Million/uL    Hemoglobin 9 4 (L) 11 5 - 15 4 g/dL    Hematocrit 31 1 (L) 34 8 - 46 1 %    MCV 83 82 - 98 fL    MCH 25 1 (L) 26 8 - 34 3 pg    MCHC 30 2 (L) 31 4 - 37 4 g/dL    RDW 18 5 (H) 11 6 - 15 1 %    Platelets 047 (H) 228 - 390 Thousands/uL    MPV 9 6 8 9 - 12 7 fL     Blood Culture:   Lab Results   Component Value Date    BLOODCX No Growth After 5 Days   05/11/2022   ,   Urinalysis:   Lab Results   Component Value Date    COLORU Yellow 10/29/2021    COLORU Yellow 07/19/2015    CLARITYU Cloudy 10/29/2021    CLARITYU Clear 07/19/2015    SPECGRAV 1 012 10/29/2021    SPECGRAV 1 015 07/19/2015    PHUR 5 0 10/29/2021    PHUR 6 0 03/04/2019    PHUR 7 0 07/19/2015    LEUKOCYTESUR Moderate (A) 10/29/2021    LEUKOCYTESUR Negative 07/19/2015    NITRITE Negative 10/29/2021    NITRITE Negative 07/19/2015    PROTEINUA Negative 07/19/2015    GLUCOSEU Negative 10/29/2021    GLUCOSEU 100 (1/10%) (A) 07/19/2015    KETONESU Negative 10/29/2021    Sarahi Minus Negative 07/19/2015    BILIRUBINUR Negative 10/29/2021    BILIRUBINUR Negative 07/19/2015    BLOODU Negative 10/29/2021    BLOODU Negative 07/19/2015   ,   Urine Culture:   Lab Results   Component Value Date    URINECX >100,000 cfu/ml Escherichia coli (A) 10/29/2021   ,   Wound Culure:   Lab Results   Component Value Date    WOUNDCULT (A) 07/28/2022     2+ Growth of Methicillin Resistant Staphylococcus aureus         Imaging:  None       VTE Pharmacologic Prophylaxis: Reason for no pharmacologic prophylaxis going for VATS  VTE Mechanical Prophylaxis: sequential compression device and foot pump applied    Current Facility-Administered Medications   Medication Dose Route Frequency    acetaminophen (TYLENOL) tablet 975 mg  975 mg Oral Q8H [address]    acetaminophen (TYLENOL) tablet 975 mg  975 mg Oral Q6H    albuterol inhalation solution 2 5 mg  2 5 mg Nebulization Q4H PRN    amiodarone tablet 100 mg  100 mg Oral Daily With Breakfast    atorvastatin (LIPITOR) tablet 40 mg  40 mg Oral Daily With Dinner    benzonatate (TESSALON PERLES) capsule 100 mg  100 mg Oral TID PRN    bumetanide (BUMEX) tablet 2 mg  2 mg Oral BID    diazepam (VALIUM) tablet 5 mg  5 mg Oral Q6H PRN    escitalopram (LEXAPRO) tablet 10 mg  10 mg Oral Daily    ferrous sulfate tablet 325 mg  325 mg Oral Daily With Breakfast    gabapentin (NEURONTIN) capsule 300 mg  300 mg Oral TID    HYDROmorphone (DILAUDID) injection 0 5 mg  0 5 mg Intravenous Q2H PRN    HYDROmorphone (DILAUDID) tablet 2 mg  2 mg Oral Q4H PRN    HYDROmorphone (DILAUDID) tablet 4 mg  4 mg Oral Q4H PRN    hydrOXYzine HCL (ATARAX) tablet 25 mg  25 mg Oral Q6H PRN    insulin glargine (LANTUS) subcutaneous injection 15 Units 0 15 mL  15 Units Subcutaneous HS    insulin lispro (HumaLOG) 100 units/mL subcutaneous injection 2-12 Units  2-12 Units Subcutaneous HS    insulin lispro (HumaLOG) 100 units/mL subcutaneous injection 2-12 Units  2-12 Units Subcutaneous TID AC    insulin lispro (HumaLOG) 100 units/mL subcutaneous injection 20 Units  20 Units Subcutaneous TID With Meals    ipratropium (ATROVENT) 0 02 % inhalation solution 0 5 mg  0 5 mg Nebulization TID    lactated ringers infusion  60 mL/hr Intravenous Continuous    levalbuterol (XOPENEX) inhalation solution 1 25 mg  1 25 mg Nebulization TID    lidocaine (LIDODERM) 5 % patch 2 patch  2 patch Topical Daily    metoprolol succinate (TOPROL-XL) 24 hr tablet 50 mg  50 mg Oral Q12H    ondansetron (ZOFRAN) injection 4 mg  4 mg Intravenous Q6H PRN    pantoprazole (PROTONIX) EC tablet 40 mg  40 mg Oral Early Morning    pantoprazole (PROTONIX) injection 40 mg  40 mg Intravenous Daily    polyethylene glycol (MIRALAX) packet 17 g  17 g Oral Daily    polyethylene glycol (MIRALAX) packet 17 g  17 g Oral Daily PRN    pregabalin (LYRICA) capsule 25 mg  25 mg Oral QPM    pregabalin (LYRICA) capsule 75 mg  75 mg Oral Daily    senna (SENOKOT) tablet 8 6 mg  1 tablet Oral Daily    senna-docusate sodium (SENOKOT S) 8 6-50 mg per tablet 1 tablet  1 tablet Oral BID    spironolactone (ALDACTONE) tablet 100 mg  100 mg Oral Daily    trimethobenzamide (TIGAN) IM injection 200 mg  200 mg Intramuscular Q6H PRN    vancomycin (VANCOCIN) 1,250 mg in sodium chloride 0 9 % 250 mL IVPB  1,250 mg Intravenous Q24H       Jamey Khanna DO  Family Medicine Resident PGY3

## 2022-08-06 NOTE — ANESTHESIA POSTPROCEDURE EVALUATION
Post-Op Assessment Note    CV Status:  Stable  Pain Score: 0    Pain management: adequate     Mental Status:  Sleepy   Hydration Status:  Euvolemic   PONV Controlled:  Controlled   Airway Patency:  Patent      Post Op Vitals Reviewed: Yes      Staff: CRNA, Anesthesiologist         No complications documented      BP   125/62   Temp (!) 97 1 °F (36 2 °C) (08/06/22 1208)    Pulse (!) 53 (08/06/22 1208)   Resp 20 (08/06/22 1208)    SpO2 92 % (08/06/22 1208)

## 2022-08-06 NOTE — ASSESSMENT & PLAN NOTE
· STEMI 10/22/2021 s/p PATRICA mid circumflex OM1  OM2 was ballooned but not stented    · Pulseless VT 10/27/21 - repeat LHC 90% mid circumflex stenosis, but could not be intervened on  · VT 10/28/21 LHC:  found to have apical LAD complete occlusion was felt to be small to be intervened    · Cardiac carrest 1/1/22 - NO cardiac cath at [facility] felt to be hypoxic vs  VT induced  · On metoprolol 50mg BID, Brilinta 90 mg BID and Lipitor 40 mg qd Yes

## 2022-08-06 NOTE — PROGRESS NOTES
Post Op Check:    Ms Priya Jade is a 66 yo female who is POD 0 s/p R VATS decortication  Overall, patient is doing well  She endorses appropriate tenderness around the R chest incisions  She has been able to produce urine without difficult, endorses no nausea or vomiting, has had no bouts of emesis, and currently is not passing flatus or having bowel movements  Currently, her chest tubes are set to -30 suction and have no air leak  General: NAD  HENT: NCAT MMM  Neck: supple, no JVD  CV: nl rate  Lungs: nl wob  No resp distress,  incisions are clean and intact with minimal drainage around the 2 chest tube insertion sites  There is no erythema around the incisions, and she is appropriately tender to palpation   200cc of bloody fluid in the canister at the time of exam   ABD: Soft, nontender, nondistended  Extrem: No CCE  Neuro: AAOx3     Plan:  Diet Regular; Regular House  Continue to monitor  Pain and nausea control PRN    Yuko Sarmiento MD  Surgery, PGY-1

## 2022-08-06 NOTE — ASSESSMENT & PLAN NOTE
· Trach placed in the context of cardiac arrest/prolonged ventilator dependent state November 2021  · Decannulated at [facility] 12/11/21  · Patient requires frequent tracheostomy changes and suctioning  · Per discussion with speech therapy,  video barium swallow was done for sense of food getting stuck   Appropriate for regular diet  · On trach collar O2 with humidification and tolerating well btw 6-8L

## 2022-08-06 NOTE — PROGRESS NOTES
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) and MRSA chest wall infection who presented with R empyema s/p R chest tube placement 7/29 and tPA at [address]  Pt is afebrile and all other VSS  She is not having any pain  CT (-20, -AL): no output recorded    Plan:  - to OR for R VATS/washout/decort  - NPO  - maintain CT to suction and monitor output  - cards following, appreciate recs  - continue abx per ID, appreciate all other ID recs  - DVT: held at midnight  - aggressive pulmonary toilet, encourage IS use post op  - encourage ambulation post op  - rest of care per primary team    Subjective/Objective   Subjective: No acute events overnight    Objective:    Blood pressure 118/68, pulse 57, temperature 98 3 °F (36 8 °C), resp  rate 16, weight 79 9 kg (176 lb 2 4 oz), SpO2 94 %  ,Body mass index is 26 01 kg/m²  I/O last 24 hours: In: 2094 2 [P O :1250;  I V :244 2; Blood:350; IV Piggyback:250]  Out: 2950 [Urine:2950]    Invasive Devices  Report    Peripheral Intravenous Line  Duration           Peripheral IV 08/04/22 Proximal;Right;Ventral (anterior) Forearm 1 day    Peripheral IV 08/04/22 Right;Upper;Ventral (anterior) Arm 1 day          Drain  Duration           Chest Tube 1 Right Posterior 12 Fr  7 days          Airway  Duration           Surgical Airway Shiley Fenestrated 156 days                Physical Exam: General appearance: alert and oriented, in no acute distress and ill appearing  Head: Normocephalic, without obvious abnormality, atraumatic  Neck: tracheotomy in place  Lungs: Normal effort, no respiratory distress  Heart: regular rate and rhythm  Abdomen: soft, nontender, nondistended  Pulses: 2+ and symmetric  Skin: Skin color, texture, turgor normal  No rashes or lesions  Neurologic: Grossly normal    Lab, Imaging and other studies:  Lab Results   Component Value Date    WBC 7 01 08/05/2022    HGB 7 2 (L) 08/05/2022    HCT 24 2 (L) 08/05/2022    MCV 79 (L) 08/05/2022     (H) 08/05/2022      Lab Results   Component Value Date    GLUCOSE 185 (H) 10/28/2021    CALCIUM 7 8 (L) 08/05/2022     07/19/2015    K 4 7 08/05/2022    CO2 28 08/05/2022     08/05/2022    BUN 40 (H) 08/05/2022    CREATININE 1 49 (H) 08/05/2022       VTE Pharmacologic Prophylaxis: Reason for no pharmacologic prophylaxis held for OR today  VTE Mechanical Prophylaxis: sequential compression device    Nona Sol (MS-4)  [facility] [address]

## 2022-08-06 NOTE — ASSESSMENT & PLAN NOTE
Follows closely with Cardiology  Rhythm controlled with amiodarone 100 mg daily and AC with Eliquis 5 mg BID  Eliquis on Hold per cardiology, for upcoming VATS procedure (Today)

## 2022-08-06 NOTE — ASSESSMENT & PLAN NOTE
Results from last 7 days   Lab Units 08/06/22  1220 08/06/22  0415 08/05/22  0209 08/04/22  0454 08/03/22  0559 08/02/22  0634 08/01/22 2026   HEMOGLOBIN g/dL 9 4* 9 2* 7 2* 6 8* 7 9* 8 2* 7 7*   HEMATOCRIT % 31 1* 31 0* 24 2* 23 5* 27 4* 27 4* 25 7*     Most likely anemia of chronic disease due to prolonged illness  No active bleeding noted  Hg (8/4): 6 8 s/p 1 I unit PRBC's   Hg (8/5): 7 2   Will give additional 1 unit to optimize for upcoming surgery  Hb stable, monitor closely  Consider transfusing if < 8 due to history of CAD  Continue Iron replacement as well as daily miralax

## 2022-08-06 NOTE — ASSESSMENT & PLAN NOTE
· Continues with intractable pain at prior chest tube site on right anterior chest wall  This was removed 7/24/22  · Was followed by APS back in [facility], S/P epidural catheter which was removed 8/2  · Current pain regimen: Diaz Tylenol 975 Q8h, PO Dilaudid 2/4 for mod/severe, IV Dilaudid 0 5 mg Q2  · Bowel regimen on board:  Scheduled senna and MiraLax daily  · Plan to discontinue IV Dilaudid when Chest tube removed

## 2022-08-06 NOTE — ANESTHESIA PREPROCEDURE EVALUATION
Procedure:  THORACOSCOPY VIDEO ASSISTED SURGERY (VATS) (Right Chest)  DECORTICATION LUNG (Right Chest)    Relevant Problems   CARDIO   (+) A-fib (HCC)   (+) Acute on chronic heart failure with preserved ejection fraction (HCC)   (+) CAD (coronary artery disease)   (+) Cardiac murmur   (+) Chest pain   (+) History of Cardiac arrest (HCC)   (+) History of Ventricular tachycardia (HCC)   (+) Hyperlipidemia associated with type 2 diabetes mellitus (HCC)   (+) Hypertension   (+) Orthopnea   (+) Right-sided thoracic back pain   (+) Thoracic aortic aneurysm without rupture (HCC)   (+) Thoracic aortic aneurysm, ruptured (HCC)      ENDO   (+) Type 2 diabetes mellitus with hyperglycemia (HCC)      GI/HEPATIC   (+) Gastroesophageal reflux disease with esophagitis      /RENAL   (+) MODESTO (acute kidney injury) ([facility])   (+) CKD (chronic kidney disease) stage 3, GFR 30-59 ml/min ([facility])      HEMATOLOGY   (+) Anemia      MUSCULOSKELETAL   (+) Fibromyalgia   (+) Low back pain   (+) Right-sided thoracic back pain   (+) Sciatica of left side      NEURO/PSYCH   (+) Anxiety   (+) Cerebral vascular accident ([facility] [address] )   (+) Chronic pain disorder   (+) Depression, recurrent ([facility])   (+) Diabetic peripheral neuropathy ([facility])   (+) Fibromyalgia   (+) Medically complex patient      PULMONARY   (+) Acute on chronic respiratory failure with hypoxia (HCC)   (+) Chronic hypoxemic respiratory failure ([facility])   (+) CHANTALE (obstructive sleep apnea)   (+) Orthopnea   (+) Shortness of breath        Physical Exam    Airway    Mallampati score: II  TM Distance: >3 FB  Neck ROM: full     Dental       Cardiovascular      Pulmonary      Other Findings  Uncuffed trach in situ      Anesthesia Plan  ASA Score- 4     Anesthesia Type- general with ASA Monitors  Additional Monitors: arterial line  Airway Plan: ETT  Comment: GA, pre-induction arterial line, possible TLC, possible UMA   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% - long discussion with surgeon regarding FREDDY vs SLT via trach site  Final plan will depend upon ease of intubation from above with small FREDDY  VL, FOB and airway equipment available  Defib pad will be placed pre-induction          Plan Factors-Exercise tolerance (METS): <4 METS  Chart reviewed  EKG reviewed  Imaging results reviewed  Existing labs reviewed  Patient summary reviewed  Patient is not a current smoker  Patient did not smoke on day of surgery  Obstructive sleep apnea risk education given perioperatively  Induction- intravenous  Postoperative Plan- Plan for postoperative opioid use  Planned trial extubation    Informed Consent- Anesthetic plan and risks discussed with patient  I personally reviewed this patient with the CRNA  Discussed and agreed on the Anesthesia Plan with the TRES Busch Anesthesia plan and consent discussed with Kelli who expressed understanding and agreement  Risks/benefits and alternatives discussed with patient including possible PONV, sore throat, damage to teeth/lips/gums and possibility of rare anesthetic and surgical emergencies

## 2022-08-06 NOTE — ASSESSMENT & PLAN NOTE
Lab Results   Component Value Date    HGBA1C 12 7 (H) 05/22/2022       Recent Labs     08/05/22  1627 08/05/22 2039 08/06/22  0625 08/06/22  1219   POCGLU 167* 157* 169* 223*     Uncontrolled per most recent A1c  Home regimen: Lantus 16U QHS and Novolog 4U TID  Current inpatient regimen: Lantus 30U qHS + Humalog 20U TID + SSI 4  Patietn NPO at midnight, will adjust insulin Regimen: Lantus 15U, Humalog 20U TID with meals and SSI     Will continue for now and adjust as needed to maintain  - 180  Readjust lantus s/p VATS

## 2022-08-06 NOTE — OP NOTE
OPERATIVE REPORT  PATIENT NAME: Santa Levy    :  1965  MRN: [id number]  Pt Location: BE OR ROOM 08    SURGERY DATE: 2022    Surgeon(s) and Role:     * Abi Nova MD - Primary     * Janeen Hayes DO - Assisting    Preop Diagnosis:  Empyema lung ([address] ) [J86 9]    Post-Op Diagnosis Codes:     * Empyema lung ([address] ) [J86 9]    Procedure(s) (LRB):  THORACOSCOPY VIDEO ASSISTED SURGERY (VATS), RIGHT (Right)  DECORTICATION LUNG (Right)  1  R VATS complete decortication, washout  2  Bronchoscopy with tracheostomy tube exchange  3  Right T3 through T10 intercostal nerve block with Exparel    Specimen(s):  ID Type Source Tests Collected by Time Destination   1 :  Body Fluid Pleural Fluid NON-GYNECOLOGIC CYTOLOGY (Canceled), ANAEROBIC CULTURE AND GRAM STAIN, FUNGAL CULTURE, BODY FLUID CULTURE AND GRAM STAIN, AFB CULTURE WITH STAIN Abi Nova MD 2022 1011    2 : right pleura Tissue Pleural, Right TISSUE EXAM Abi Nova MD 2022 1025    A :  Tissue Pleural, Right ANAEROBIC CULTURE AND GRAM STAIN, FUNGAL CULTURE, CULTURE, TISSUE AND GRAM STAIN, AFB CULTURE WITH STAIN Abi Nova MD 2022 1007        Estimated Blood Loss:   950 mL    Drains:  Chest Tube 1 Right Pleural 28 Fr  (Active)   Number of days: 0       Chest Tube 2 Right Pleural 28 Fr  (Active)   Number of days: 0       Urethral Catheter Latex 16 Fr  (Active)   Number of days: 0       Anesthesia Type:   General    Operative Indications:  Empyema lung ([address] ) [J809]  52-year-old female with complex history but recently with a right empyema that has failed conservative management     Operative Findings:  Profound inflammatory infectious changes at the right lung base  Encountered pockets of purulence and posterior fluid collection  Sent multiple areas for culture  Able to fully decorticate no open up  Washed out fully      Complications:   None    Procedure and Technique:  After obtaining informed consent from the patient, they were transferred to the operating room and placed supine on the operating table  Bilateral SCDs were placed and turned on prior to induction of general anesthesia  General anesthesia was then induced through the patient's previous tracheostomy  We then placed a 7 0 endotracheal tube through her tracheostomy stoma  I then performed a bronchoscopy from above while attempting to place a double-lumen tube  There was significant subglottic stenosis below the vocal cords above the tracheostomy placement  It was difficult to get a pediatric scope through here  With that I did not feel a double-lumen tube could safely get into this space  We then advanced the 7 0 endotracheal tube into the left mainstem  We were able to blow up the balloon occlude the left mainstem isolating the left lung  Repeat bronchoscopy around the tracheal stoma was able to get to the right space and show that this was isolated  The patient was then positioned in a left lateral decubitus position with the table fully flexed  The left arm was secured to an armboard and the right arm was positioned safely in a arm clifford  A rolled sheet was then used for an axillary roll which tacked the brachial plexus  Rolled blankets were then used anteriorly and posteriorly to hold the patient in position  All bony prominences were padded and checked  Positioning of double-lumen endotracheal tube was verified at this time  Her previous pigtail drain was removed at this time  Next a formal time-out was called verifying patient , date of birth, procedure, consent, antibiotics, beta-blocker as indicated, anticipated specimens with handling, SCD use and other special considerations  A bronchoscopy was performed through the Endotracheal tube in her tracheostomy stoma  There were no endobronchial masses or obstructions identified  All secretions were suctioned out and tube positioning was verified again   There was no suspicion or identified risk for TB or other air board infectious disease  This bronchoscopy was being performed for diagnostic purposes only  The right chest was then prepped and draped in the standard sterile fashion  Bony landmarks were identified and planned incisions were mapped out  A 20 mL 1/2% bupivicaine, 20mL saline and 20mL liposomal bupivicaine (Exparel) mixture was made  5mL of this solution was used to numb our initial incision and interspace  An 10 mm incision was made in the posterior axillary line 7th interspace for the camera port  The camera was inserted verifying that we were in the thoracic cavity  A thorough thoracoscopy was carried out at this time  Here we found a in inflated lung and very little space  We did some blunt dissection to creates more working space and had Anesthesia advance the endotracheal tube further into the left mainstem    We then made a 3cm anterior access incision in the 6th interspace, anterior axillary line  A soft tissue wound retractor was placed through here  We that it is more blunt dissection to get the lung off of the chest wall  We were eventually able to dissect out a working space  We then began to systematically performed our decortication  We started by circumferentially dissecting the lung off of the chest wall  We then mobilized the posterior hilum  We were able to free up posteriorly and moved towards the apex to free the lung  We then dissected off the anterior hilum  Once this was freed return to the diaphragm and fully dissected the lung off the diaphragm  The largest areas of inflammation were posteriorly  We encountered areas of purulence back here  We suctioned out fluid and sent this for culture and cytology  We also sent tissue for culture and permanent pathology  We then dissected the diaphragm out circumferentially to free this up  This point there was moderate amounts of raw surface area of bleeding    The lung was clearly able to fill the pleural space  We had freed up all fissures and the lung circumferentially unroofing all pockets  We packed posteriorly and still had some raw surface area bleeding  We controlled this with cautery and Surgicel powder  Once comfortable with no further bleeding we decided this was reasonable and washed out  The thoracic space was then irrigated out with 4L of warm sterile saline and suctioned out  Satisfied with hemostasis, we placed our chest tubes  A 28 fr straight chest tube was placed through the posterior camera port and directed towards the apex  A 28 Fr curved chest tube was placed through a separate stab incision anteriorly and directed along the diaphragm  The lung was then reinsufflated under direct visualization  The soft tissue around these tubes was closed with 2-0 vicryl  U sutures were placed around the chest tubes with an 0 prolene and the chest tubes were secured with another 0 prolone  A then closed the access incision with layers with running 0 Vicryl, 2 0 Vicryl, and 4 Monocryl  Surgical glue was placed to this incision  Both chest tubes were placed to suction at this time  Dry sterile dressings were applied and the drapes were broken down  All instrument and needle counts were correct at this time  The patient was extubated and transported to the PACU in stable condition  I was scrubbed and was present for the entire procedure          I was present for the entire procedure    Patient Disposition:  PACU       SIGNATURE: Funmilayo Flores MD  DATE: August 6, 2022  TIME: 12:01 PM

## 2022-08-06 NOTE — ASSESSMENT & PLAN NOTE
Wt Readings from Last 3 Encounters:   08/06/22 79 8 kg (176 lb)   08/02/22 77 7 kg (171 lb 6 4 oz)   07/19/22 78 2 kg (172 lb 6 4 oz)     · Initially admitted to [facility] on 07/15 with CHF exacerbation, now optimized  · Most recent EF 50% with normal systolic and diastolic function on 0/01/7140  Currently stable and euvolemic  · On Bumex 2 mg BID with Aldactone 100 mg daily - restarted on 7/29  · Continue low-salt diet, strict I's and O's monitoring  · Per Cardiology at [facility] upon discharge    · Will need follow-up with General Cardiology & EP service upon discharge for ICD consideration

## 2022-08-06 NOTE — ANESTHESIA PROCEDURE NOTES
Arterial Line Insertion  Performed by: Nancy Elias MD  Authorized by: Nancy Elias MD   Consent: Verbal consent obtained  Written consent obtained  Risks and benefits: risks, benefits and alternatives were discussed  Consent given by: patient  Patient understanding: patient states understanding of the procedure being performed  Patient consent: the patient's understanding of the procedure matches consent given  Procedure consent: procedure consent matches procedure scheduled  Required items: required blood products, implants, devices, and special equipment available  Patient identity confirmed: verbally with patient, arm band and provided demographic data  Preparation: Patient was prepped and draped in the usual sterile fashion  Indications: multiple ABGs, respiratory failure and hemodynamic monitoring  Orientation:  Right  Location: brachial artery  Procedure Details:  Needle gauge: 20  Seldinger technique: Seldinger technique used  Number of attempts: 1    Post-procedure:  Post-procedure: dressing applied  Waveform: good waveform  Post-procedure CNS: normal and unchanged  Comments: Patient had difficult arterial access given long history of complicated hospitalizations s/p VT arrests x2 within the past year  After attempted radial access x2 on L and small radial artery on R, decision to place right brachial was made  Single skin and vessel puncture  Easy thread of wires  No apparent hematoma or complications  Ultrasound guided

## 2022-08-07 ENCOUNTER — APPOINTMENT (INPATIENT)
Dept: RADIOLOGY | Facility: HOSPITAL | Age: 57
DRG: 853 | End: 2022-08-07
Payer: COMMERCIAL

## 2022-08-07 DIAGNOSIS — E11.9 TYPE 2 DIABETES MELLITUS TREATED WITH INSULIN (HCC): ICD-10-CM

## 2022-08-07 DIAGNOSIS — Z79.4 TYPE 2 DIABETES MELLITUS TREATED WITH INSULIN (HCC): ICD-10-CM

## 2022-08-07 LAB
ABO GROUP BLD BPU: NORMAL
ABO GROUP BLD BPU: NORMAL
ALBUMIN SERPL BCP-MCNC: 1.5 G/DL (ref 3.5–5)
ALP SERPL-CCNC: 56 U/L (ref 46–116)
ALT SERPL W P-5'-P-CCNC: 11 U/L (ref 12–78)
AMMONIA PLAS-SCNC: 26 UMOL/L (ref 11–35)
ANION GAP SERPL CALCULATED.3IONS-SCNC: 4 MMOL/L (ref 4–13)
ANION GAP SERPL CALCULATED.3IONS-SCNC: 6 MMOL/L (ref 4–13)
AST SERPL W P-5'-P-CCNC: 19 U/L (ref 5–45)
BASE EXCESS BLDA CALC-SCNC: 1.1 MMOL/L
BASOPHILS # BLD AUTO: 0.01 THOUSANDS/ΜL (ref 0–0.1)
BASOPHILS NFR BLD AUTO: 0 % (ref 0–1)
BILIRUB SERPL-MCNC: 0.28 MG/DL (ref 0.2–1)
BPU ID: NORMAL
BPU ID: NORMAL
BUN SERPL-MCNC: 42 MG/DL (ref 5–25)
BUN SERPL-MCNC: 43 MG/DL (ref 5–25)
CALCIUM ALBUM COR SERPL-MCNC: 9.7 MG/DL (ref 8.3–10.1)
CALCIUM SERPL-MCNC: 7.7 MG/DL (ref 8.3–10.1)
CALCIUM SERPL-MCNC: 8 MG/DL (ref 8.3–10.1)
CHLORIDE SERPL-SCNC: 102 MMOL/L (ref 96–108)
CHLORIDE SERPL-SCNC: 104 MMOL/L (ref 96–108)
CO2 SERPL-SCNC: 27 MMOL/L (ref 21–32)
CO2 SERPL-SCNC: 28 MMOL/L (ref 21–32)
CREAT SERPL-MCNC: 1.32 MG/DL (ref 0.6–1.3)
CREAT SERPL-MCNC: 1.39 MG/DL (ref 0.6–1.3)
CROSSMATCH: NORMAL
CROSSMATCH: NORMAL
EOSINOPHIL # BLD AUTO: 0.01 THOUSAND/ΜL (ref 0–0.61)
EOSINOPHIL NFR BLD AUTO: 0 % (ref 0–6)
ERYTHROCYTE [DISTWIDTH] IN BLOOD BY AUTOMATED COUNT: 18.6 % (ref 11.6–15.1)
ERYTHROCYTE [DISTWIDTH] IN BLOOD BY AUTOMATED COUNT: 18.6 % (ref 11.6–15.1)
GFR SERPL CREATININE-BSD FRML MDRD: 42 ML/MIN/1.73SQ M
GFR SERPL CREATININE-BSD FRML MDRD: 45 ML/MIN/1.73SQ M
GLUCOSE SERPL-MCNC: 176 MG/DL (ref 65–140)
GLUCOSE SERPL-MCNC: 190 MG/DL (ref 65–140)
GLUCOSE SERPL-MCNC: 196 MG/DL (ref 65–140)
GLUCOSE SERPL-MCNC: 70 MG/DL (ref 65–140)
GLUCOSE SERPL-MCNC: 77 MG/DL (ref 65–140)
GLUCOSE SERPL-MCNC: 79 MG/DL (ref 65–140)
GLUCOSE SERPL-MCNC: 95 MG/DL (ref 65–140)
HCO3 BLDA-SCNC: 26.5 MMOL/L (ref 22–28)
HCT VFR BLD AUTO: 24.8 % (ref 34.8–46.1)
HCT VFR BLD AUTO: 25.1 % (ref 34.8–46.1)
HCT VFR BLD AUTO: 26.9 % (ref 34.8–46.1)
HGB BLD-MCNC: 7.7 G/DL (ref 11.5–15.4)
HGB BLD-MCNC: 7.7 G/DL (ref 11.5–15.4)
HGB BLD-MCNC: 8.2 G/DL (ref 11.5–15.4)
IMM GRANULOCYTES # BLD AUTO: 0.07 THOUSAND/UL (ref 0–0.2)
IMM GRANULOCYTES NFR BLD AUTO: 1 % (ref 0–2)
LACTATE SERPL-SCNC: 0.7 MMOL/L (ref 0.5–2)
LYMPHOCYTES # BLD AUTO: 1.09 THOUSANDS/ΜL (ref 0.6–4.47)
LYMPHOCYTES NFR BLD AUTO: 10 % (ref 14–44)
MAGNESIUM SERPL-MCNC: 2.1 MG/DL (ref 1.6–2.6)
MCH RBC QN AUTO: 25.3 PG (ref 26.8–34.3)
MCH RBC QN AUTO: 25.6 PG (ref 26.8–34.3)
MCHC RBC AUTO-ENTMCNC: 30.7 G/DL (ref 31.4–37.4)
MCHC RBC AUTO-ENTMCNC: 31 G/DL (ref 31.4–37.4)
MCV RBC AUTO: 82 FL (ref 82–98)
MCV RBC AUTO: 83 FL (ref 82–98)
MONOCYTES # BLD AUTO: 1.19 THOUSAND/ΜL (ref 0.17–1.22)
MONOCYTES NFR BLD AUTO: 11 % (ref 4–12)
NEUTROPHILS # BLD AUTO: 8.71 THOUSANDS/ΜL (ref 1.85–7.62)
NEUTS SEG NFR BLD AUTO: 78 % (ref 43–75)
NRBC BLD AUTO-RTO: 0 /100 WBCS
O2 CT BLDA-SCNC: 11.7 ML/DL (ref 16–23)
OXYHGB MFR BLDA: 91.7 % (ref 94–97)
PCO2 BLDA: 46 MM HG (ref 36–44)
PH BLDA: 7.38 [PH] (ref 7.35–7.45)
PLATELET # BLD AUTO: 387 THOUSANDS/UL (ref 149–390)
PLATELET # BLD AUTO: 423 THOUSANDS/UL (ref 149–390)
PMV BLD AUTO: 10.1 FL (ref 8.9–12.7)
PMV BLD AUTO: 9.7 FL (ref 8.9–12.7)
PO2 BLDA: 70.3 MM HG (ref 75–129)
POTASSIUM SERPL-SCNC: 4.4 MMOL/L (ref 3.5–5.3)
POTASSIUM SERPL-SCNC: 5 MMOL/L (ref 3.5–5.3)
PROT SERPL-MCNC: 6 G/DL (ref 6.4–8.4)
RBC # BLD AUTO: 3.01 MILLION/UL (ref 3.81–5.12)
RBC # BLD AUTO: 3.04 MILLION/UL (ref 3.81–5.12)
SODIUM SERPL-SCNC: 135 MMOL/L (ref 135–147)
SODIUM SERPL-SCNC: 136 MMOL/L (ref 135–147)
SPECIMEN SOURCE: ABNORMAL
UNIT DISPENSE STATUS: NORMAL
UNIT DISPENSE STATUS: NORMAL
UNIT PRODUCT CODE: NORMAL
UNIT PRODUCT CODE: NORMAL
UNIT PRODUCT VOLUME: 350 ML
UNIT PRODUCT VOLUME: 350 ML
UNIT RH: NORMAL
UNIT RH: NORMAL
WBC # BLD AUTO: 11.08 THOUSAND/UL (ref 4.31–10.16)
WBC # BLD AUTO: 9.57 THOUSAND/UL (ref 4.31–10.16)

## 2022-08-07 PROCEDURE — 83735 ASSAY OF MAGNESIUM: CPT | Performed by: THORACIC SURGERY (CARDIOTHORACIC VASCULAR SURGERY)

## 2022-08-07 PROCEDURE — 80048 BASIC METABOLIC PNL TOTAL CA: CPT | Performed by: THORACIC SURGERY (CARDIOTHORACIC VASCULAR SURGERY)

## 2022-08-07 PROCEDURE — 80053 COMPREHEN METABOLIC PANEL: CPT

## 2022-08-07 PROCEDURE — 82805 BLOOD GASES W/O2 SATURATION: CPT

## 2022-08-07 PROCEDURE — 85014 HEMATOCRIT: CPT

## 2022-08-07 PROCEDURE — 97163 PT EVAL HIGH COMPLEX 45 MIN: CPT

## 2022-08-07 PROCEDURE — 99232 SBSQ HOSP IP/OBS MODERATE 35: CPT | Performed by: FAMILY MEDICINE

## 2022-08-07 PROCEDURE — 85018 HEMOGLOBIN: CPT

## 2022-08-07 PROCEDURE — P9016 RBC LEUKOCYTES REDUCED: HCPCS

## 2022-08-07 PROCEDURE — 71045 X-RAY EXAM CHEST 1 VIEW: CPT

## 2022-08-07 PROCEDURE — 82948 REAGENT STRIP/BLOOD GLUCOSE: CPT

## 2022-08-07 PROCEDURE — 30233N1 TRANSFUSION OF NONAUTOLOGOUS RED BLOOD CELLS INTO PERIPHERAL VEIN, PERCUTANEOUS APPROACH: ICD-10-PCS

## 2022-08-07 PROCEDURE — C9113 INJ PANTOPRAZOLE SODIUM, VIA: HCPCS | Performed by: SURGERY

## 2022-08-07 PROCEDURE — 99024 POSTOP FOLLOW-UP VISIT: CPT | Performed by: THORACIC SURGERY (CARDIOTHORACIC VASCULAR SURGERY)

## 2022-08-07 PROCEDURE — 82140 ASSAY OF AMMONIA: CPT

## 2022-08-07 PROCEDURE — 83605 ASSAY OF LACTIC ACID: CPT

## 2022-08-07 PROCEDURE — 85025 COMPLETE CBC W/AUTO DIFF WBC: CPT

## 2022-08-07 PROCEDURE — 85027 COMPLETE CBC AUTOMATED: CPT | Performed by: THORACIC SURGERY (CARDIOTHORACIC VASCULAR SURGERY)

## 2022-08-07 PROCEDURE — P9040 RBC LEUKOREDUCED IRRADIATED: HCPCS

## 2022-08-07 RX ORDER — PANTOPRAZOLE SODIUM 40 MG/1
40 TABLET, DELAYED RELEASE ORAL
Status: DISCONTINUED | OUTPATIENT
Start: 2022-08-08 | End: 2022-08-18 | Stop reason: HOSPADM

## 2022-08-07 RX ORDER — ALBUMIN, HUMAN INJ 5% 5 %
25 SOLUTION INTRAVENOUS ONCE
Status: COMPLETED | OUTPATIENT
Start: 2022-08-07 | End: 2022-08-07

## 2022-08-07 RX ORDER — MIDODRINE HYDROCHLORIDE 5 MG/1
5 TABLET ORAL ONCE
Status: COMPLETED | OUTPATIENT
Start: 2022-08-07 | End: 2022-08-07

## 2022-08-07 RX ORDER — ALBUMIN, HUMAN INJ 5% 5 %
SOLUTION INTRAVENOUS
Status: DISPENSED
Start: 2022-08-07 | End: 2022-08-08

## 2022-08-07 RX ORDER — SODIUM CHLORIDE, SODIUM GLUCONATE, SODIUM ACETATE, POTASSIUM CHLORIDE, MAGNESIUM CHLORIDE, SODIUM PHOSPHATE, DIBASIC, AND POTASSIUM PHOSPHATE .53; .5; .37; .037; .03; .012; .00082 G/100ML; G/100ML; G/100ML; G/100ML; G/100ML; G/100ML; G/100ML
500 INJECTION, SOLUTION INTRAVENOUS ONCE
Status: COMPLETED | OUTPATIENT
Start: 2022-08-07 | End: 2022-08-07

## 2022-08-07 RX ORDER — MAGNESIUM SULFATE 1 G/100ML
1 INJECTION INTRAVENOUS ONCE
Status: COMPLETED | OUTPATIENT
Start: 2022-08-07 | End: 2022-08-07

## 2022-08-07 RX ADMIN — SODIUM CHLORIDE, SODIUM LACTATE, POTASSIUM CHLORIDE, AND CALCIUM CHLORIDE 60 ML/HR: .6; .31; .03; .02 INJECTION, SOLUTION INTRAVENOUS at 07:10

## 2022-08-07 RX ADMIN — HYDROMORPHONE HYDROCHLORIDE 4 MG: 2 TABLET ORAL at 03:18

## 2022-08-07 RX ADMIN — INSULIN LISPRO 2 UNITS: 100 INJECTION, SOLUTION INTRAVENOUS; SUBCUTANEOUS at 11:22

## 2022-08-07 RX ADMIN — ACETAMINOPHEN 975 MG: 325 TABLET ORAL at 14:11

## 2022-08-07 RX ADMIN — ESCITALOPRAM OXALATE 10 MG: 10 TABLET ORAL at 09:06

## 2022-08-07 RX ADMIN — PANTOPRAZOLE SODIUM 40 MG: 40 INJECTION, POWDER, FOR SOLUTION INTRAVENOUS at 06:39

## 2022-08-07 RX ADMIN — LIDOCAINE 5% 2 PATCH: 700 PATCH TOPICAL at 09:07

## 2022-08-07 RX ADMIN — SENNOSIDES AND DOCUSATE SODIUM 1 TABLET: 50; 8.6 TABLET ORAL at 09:07

## 2022-08-07 RX ADMIN — ALBUMIN (HUMAN) 25 G: 12.5 INJECTION, SOLUTION INTRAVENOUS at 22:28

## 2022-08-07 RX ADMIN — ACETAMINOPHEN 975 MG: 325 TABLET ORAL at 06:38

## 2022-08-07 RX ADMIN — BUMETANIDE 2 MG: 2 TABLET ORAL at 09:06

## 2022-08-07 RX ADMIN — VANCOMYCIN HYDROCHLORIDE 1250 MG: 10 INJECTION, POWDER, LYOPHILIZED, FOR SOLUTION INTRAVENOUS at 14:11

## 2022-08-07 RX ADMIN — POLYETHYLENE GLYCOL 3350 17 G: 17 POWDER, FOR SOLUTION ORAL at 09:06

## 2022-08-07 RX ADMIN — SENNOSIDES 8.6 MG: 8.6 TABLET, FILM COATED ORAL at 09:07

## 2022-08-07 RX ADMIN — SODIUM CHLORIDE 500 ML: 0.9 INJECTION, SOLUTION INTRAVENOUS at 16:43

## 2022-08-07 RX ADMIN — AMIODARONE HYDROCHLORIDE 100 MG: 200 TABLET ORAL at 09:07

## 2022-08-07 RX ADMIN — HYDROMORPHONE HYDROCHLORIDE 4 MG: 2 TABLET ORAL at 06:38

## 2022-08-07 RX ADMIN — METOPROLOL SUCCINATE 50 MG: 50 TABLET, EXTENDED RELEASE ORAL at 06:38

## 2022-08-07 RX ADMIN — INSULIN LISPRO 20 UNITS: 100 INJECTION, SOLUTION INTRAVENOUS; SUBCUTANEOUS at 09:09

## 2022-08-07 RX ADMIN — MIDODRINE HYDROCHLORIDE 5 MG: 5 TABLET ORAL at 19:02

## 2022-08-07 RX ADMIN — INSULIN LISPRO 20 UNITS: 100 INJECTION, SOLUTION INTRAVENOUS; SUBCUTANEOUS at 11:22

## 2022-08-07 RX ADMIN — PREGABALIN 75 MG: 75 CAPSULE ORAL at 09:06

## 2022-08-07 RX ADMIN — ATORVASTATIN CALCIUM 40 MG: 40 TABLET, FILM COATED ORAL at 17:36

## 2022-08-07 RX ADMIN — GABAPENTIN 300 MG: 300 CAPSULE ORAL at 09:07

## 2022-08-07 RX ADMIN — MAGNESIUM SULFATE IN DEXTROSE 1 G: 10 INJECTION, SOLUTION INTRAVENOUS at 09:18

## 2022-08-07 RX ADMIN — SPIRONOLACTONE 100 MG: 50 TABLET ORAL at 09:06

## 2022-08-07 RX ADMIN — SODIUM CHLORIDE, SODIUM GLUCONATE, SODIUM ACETATE, POTASSIUM CHLORIDE, MAGNESIUM CHLORIDE, SODIUM PHOSPHATE, DIBASIC, AND POTASSIUM PHOSPHATE 500 ML: .53; .5; .37; .037; .03; .012; .00082 INJECTION, SOLUTION INTRAVENOUS at 19:01

## 2022-08-07 RX ADMIN — HYDROMORPHONE HYDROCHLORIDE 0.5 MG: 1 INJECTION, SOLUTION INTRAMUSCULAR; INTRAVENOUS; SUBCUTANEOUS at 06:37

## 2022-08-07 RX ADMIN — INSULIN LISPRO 2 UNITS: 100 INJECTION, SOLUTION INTRAVENOUS; SUBCUTANEOUS at 07:36

## 2022-08-07 NOTE — ASSESSMENT & PLAN NOTE
Lab Results   Component Value Date    HGBA1C 12 7 (H) 05/22/2022       Recent Labs     08/06/22  1219 08/06/22  1607 08/06/22  2121 08/07/22  0611   POCGLU 223* 260* 249* 176*     Uncontrolled per most recent A1c  Home regimen: Lantus 16U QHS and Novolog 4U TID  Current inpatient regimen: Lantus 30U qHS + Humalog 20U TID + SSI 4  Will continue for now and adjust as needed to maintain  - 180

## 2022-08-07 NOTE — ASSESSMENT & PLAN NOTE
Wt Readings from Last 3 Encounters:   08/07/22 81 6 kg (179 lb 12 8 oz)   08/02/22 77 7 kg (171 lb 6 4 oz)   07/19/22 78 2 kg (172 lb 6 4 oz)     · Initially admitted to [facility] on 07/15 with CHF exacerbation, now optimized  · Most recent EF 50% with normal systolic and diastolic function on 0/63/5294  Currently stable and euvolemic  · On Bumex 2 mg BID with Aldactone 100 mg daily - restarted on 7/29  · Continue low-salt diet, strict I's and O's monitoring  · Per Cardiology at [facility] upon discharge    · Will need follow-up with General Cardiology & EP service upon discharge for ICD consideration

## 2022-08-07 NOTE — PROGRESS NOTES
Thoracic Surgery  Progress Note   Melinda Arana 65 y o  female MRN: [id number]  Unit/Bed#: [facility] 429-01 Encounter: [id number]    Assessment:  Ms Bari Norwood is a 66 yo female who is  s/p R VATS decortication with intraoperative chest tube placement x2      AF,VSS    Morning labs  WBC:  9 57 from 14  HGB:  7 7 from 9 4     Chest tube x2:  -30, negative AL, 100 cc serosanguineous output  Plan:  -continue chest tube to suction; will decrease to -20 today  -pain control as needed  -aggressive pulmonary toilet  -follow up OR cultures  -continue antibiotics  -regular diet  -rest of care per primary team    Subjective/Objective     Subjective: Patient seen and examined at bedside, in no acute distress  No acute events overnight  Overall, patient is doing well  She endorses appropriate tenderness around the R chest incisions  She has been able to produce urine without difficult, endorses no nausea or vomiting, has had no bouts of emesis, and currently is not passing flatus or having bowel movements  Objective:     Vitals:Blood pressure 110/57, pulse 57, temperature 98 1 °F (36 7 °C), temperature source Oral, resp  rate 20, weight 79 8 kg (176 lb), SpO2 96 %  ,Body mass index is 25 99 kg/m²       Temp (24hrs), Av 8 °F (36 6 °C), Min:97 1 °F (36 2 °C), Max:98 2 °F (36 8 °C)  Current: Temperature: 98 1 °F (36 7 °C)      Intake/Output Summary (Last 24 hours) at 2022 0655  Last data filed at 2022 0320  Gross per 24 hour   Intake 4563 98 ml   Output 2640 ml   Net 1923 98 ml       Invasive Devices  Report    Peripheral Intravenous Line  Duration           Peripheral IV 22 Left Arm <1 day    Peripheral IV 22 Right Hand <1 day          Arterial Line  Duration           Arterial Line 22 Right Brachial <1 day          Drain  Duration           Chest Tube 1 Right Pleural 28 Fr  <1 day    Chest Tube 2 Right Pleural 28 Fr  <1 day    Urethral Catheter Latex 16 Fr  <1 day          Airway  Duration Surgical Airway Shiley Fenestrated 158 days                Physical Exam:  General: No acute distress, alert and oriented  Chest:  Chest tube in place as above dressings clean dry and tract  CV: Well perfused, regular rate and rhythm  Lungs: Normal work of breathing, no increased respiratory effort  Abdomen: Soft, non-tender, non-distended  Incision(s) clean, dry and intact    Extremities: No edema, clubbing or cyanosis  Skin: Warm, dry    Lab Results: Results: I have personally reviewed all pertinent laboratory/tests results  VTE Prophylaxis: Sequential compression device (Ismael Cabrera)     Pamela Cameron MD  8/7/2022

## 2022-08-07 NOTE — ASSESSMENT & PLAN NOTE
· Continues with intractable pain at prior chest tube site on right anterior chest wall  This was removed 7/24/22  · Was followed by APS back in [place], S/P epidural catheter which was removed 8/2  · Current pain regimen: Daiz Tylenol 975 Q8h, PO Dilaudid 2/4 for mod/severe, IV Dilaudid 0 5 mg Q2  · Bowel regimen:  Scheduled senna and MiraLax daily  · Plan to discontinue IV Dilaudid when Chest tube removed

## 2022-08-07 NOTE — QUICK NOTE
Notified by patient's nurse about patient having low blood pressures with MAP's <65  Saw and evaluated patient at bedside  Physical exam unchanged from earlier this AM  Patient denies any chest pain, palpitation, SOB and oxygenation at her baseline, headaches or dizziness  Also no active bleeding reported per patient and nurse who was present at bedside    - Will  hold off on any antihypertensives for now   - Will  Bolus with 500cc IV fluids  - Continue to monitor and low threshold to consult critical care at this time

## 2022-08-07 NOTE — ASSESSMENT & PLAN NOTE
Results from last 7 days   Lab Units 08/07/22  0311 08/06/22  1837 08/06/22  1220 08/06/22  1118 08/06/22  1058 08/06/22  0951 08/06/22  0415   HEMOGLOBIN g/dL 7 7* 8 5* 9 4*  --   --   --  9 2*   I STAT HEMOGLOBIN g/dl  --   --   --  9 2* 8 2* 9 2*  --    HEMATOCRIT % 25 1* 27 8* 31 1*  --   --   --  31 0*   HEMATOCRIT, ISTAT %  --   --   --  27* 24* 27*  --      Most likely anemia of chronic disease due to prolonged illness  No active bleeding noted  Hg (8/4): 6 8 s/p  2 unit PRBC's   Hb stable, monitor closely  Consider transfusing if < 8 due to history of CAD  Continue Iron replacement as well as daily miralax

## 2022-08-07 NOTE — PROGRESS NOTES
Vancomycin IV Pharmacy-to-Dose Consultation    Damaris Vazquez is a 65 y o  female who is currently receiving Vancomycin IV with management by the Pharmacy Consult service  Assessment/Plan:  The patient was reviewed  Renal function is stable and no signs or symptoms of nephrotoxicity and/or infusion reactions were documented in the chart  Based on todays assessment, continue current vancomycin (day # 7) dosing of 1250 mg q24h, with a plan for trough to be drawn at 1330 on 8/10  We will continue to follow the patients culture results and clinical progress daily      Yariel Mooney, HardikD  Emergency Medicine Clinical Pharmacist    or Reji

## 2022-08-07 NOTE — ASSESSMENT & PLAN NOTE
· STEMI 10/22/2021 s/p PATRICA mid circumflex OM1  OM2 was ballooned but not stented    · Pulseless VT 10/27/21 - repeat LHC 90% mid circumflex stenosis, but could not be intervened on  · VT 10/28/21 LHC:  found to have apical LAD complete occlusion was felt to be small to be intervened    · Cardiac carrest 1/1/22 - [facility] felt to be hypoxic vs  VT induced  · On metoprolol 50mg BID, Brilinta 90 mg BID and Lipitor 40 mg qd

## 2022-08-07 NOTE — PLAN OF CARE
Problem: PHYSICAL THERAPY ADULT  Goal: Performs mobility at highest level of function for planned discharge setting  See evaluation for individualized goals  Description: Treatment/Interventions: Functional transfer training, LE strengthening/ROM, Therapeutic exercise, Endurance training, Equipment eval/education, Bed mobility, Gait training, Spoke to nursing  Equipment Recommended: Karis Garber       See flowsheet documentation for full assessment, interventions and recommendations  Note: Prognosis: Fair  Problem List: Decreased strength, Decreased endurance, Impaired balance, Decreased mobility, Obesity  Assessment: Pt is 65 y o  female admitted with hx of CP and Dx of Empyema lung, Acute on chronic heart failure with preserved ejection fraction, and Acute Respiratory insufficiency on chronic hypoxic respiratory failure  During the course, pt underwent R VATS complete decortication, washout, Bronchoscopy with tracheostomy tube exchange and Right T3 through T10 intercostal nerve block with Exparel on 8/6/2022  Pt 's comorbidities affecting POC include: Anxiety, Aortic aneurysm ([address] ), Arthritis, Depression, Diabetes mellitus ([address] ), Fibromyalgia, Hypertension, Migraines, tracheostomy,and Uncontrolled hypertension and personal factors of: FELTON and steps in the house  Pt's clinical presentation is currently unstable/unpredictable which is evident in ongoing telem monitoring w/ a-line in place, abn lab values, CT and inability to progress further w/ amb at this time due to limited standing balance/tolerance  Pt presents w/ generalized weakness, incl decreased LE strength, decreased functional endurance and activity tolerance, impaired balance, transfers difficulty and inability to amb at this time  Will cont to follow pt in PT for progressive mobilization to address above functional deficits and to max level of (I), endurance, and safety  Currently recommend rehab upon D/C  Will cont to follow until then    Barriers to Discharge: Inaccessible home environment     PT Discharge Recommendation: Post acute rehabilitation services    See flowsheet documentation for full assessment

## 2022-08-07 NOTE — ASSESSMENT & PLAN NOTE
· MODESTO noted on initial admission to [facility] with elevated Cr 1 64, now resolved  · Baseline Cr being 0 9-1 0  · Continue to monitor closely   Losartan on hold  · Avoid nephrotoxic agents

## 2022-08-07 NOTE — ASSESSMENT & PLAN NOTE
Follows closely with Cardiology  Rhythm controlled with amiodarone 100 mg daily and AC with Eliquis 5 mg BID  Eliquis on Hold per cardiology due to procedure (VATS)

## 2022-08-07 NOTE — ASSESSMENT & PLAN NOTE
BP stable  On home losartan 50 mg BID, follows closely with Cardiology  Losartan on hold but can consider restarting as MODESTO has now resolved  Monitor BP closely

## 2022-08-07 NOTE — PROGRESS NOTES
[facility]  Progress Note - Linda Fam 1965, 65 y o  female MRN: [id number]  Unit/Bed#: [facility] 429-01 Encounter: [id number]  Primary Care Provider: Daina Coyne MD   Date and time admitted to hospital: 8/2/2022  8:31 PM    * Empyema lung, Right  Assessment & Plan  · Loculated right pleural effusion  S/P right-sided chest tube insertion and removal on 07/24  · CT of the chest on 7/28/22 revealed moderate partially loculated right pleural effusion  · Right chest tube re-placed on 7/29/22 by IR  · Pleural fluid analysis shows neutrophil predominant WBC count  · Fluid cultures from prior anterior chest tube site positive for MRSA  · 8/1 - CXR: Small component of right pleural effusion suspected which may be partially loculated  Indwelling right thoracostomy tube without pneumothorax  · 8/2 CT Chest:  Decreased size of a right-sided pleural effusion and improved aeration of the right lower lobe post placement of a right pleural drainage catheter, with unchanged appearance of a loculated component of the effusion superomedially  There is slightly  increased density of the effusion compatible with a small amount of blood products  2   Appearance of new inflammatory groundglass opacities in the left lower lobe  3   Mild background interstitial edema is unchanged  · 8/6 - S/P VATS Decortation  procedure     Plan:  - Continue Abx w/ Vanc given MRSA, Vanc will need to be continued through 8/16 per ID  - Maintain CT to suction and monitor output  - Aggressive pulmonary toilet  - Monitor fever curve and white count closely    Acute on chronic heart failure with preserved ejection fraction (HCC)  Assessment & Plan  Wt Readings from Last 3 Encounters:   08/07/22 81 6 kg (179 lb 12 8 oz)   08/02/22 77 7 kg (171 lb 6 4 oz)   07/19/22 78 2 kg (172 lb 6 4 oz)     · Initially admitted to [facility] on 07/15 with CHF exacerbation, now optimized  · Most recent EF 50% with normal systolic and diastolic function on 4/60/1079  Currently stable and euvolemic  · On Bumex 2 mg BID with Aldactone 100 mg daily - restarted on 7/29  · Continue low-salt diet, strict I's and O's monitoring  · Per Cardiology at [facility] upon discharge  · Will need follow-up with General Cardiology & EP service upon discharge for ICD consideration    Chest wall wound infection  Assessment & Plan  · Purulent discharge noted at site of recent chest tube placement  · Cultures positive for MRSA on 07/28 susceptible to vanc  · Currently on IV vancomycin - will continue  · Continue local wound care/dressing changes     Pain at Chest tube insertion site  Assessment & Plan  · Continues with intractable pain at prior chest tube site on right anterior chest wall  This was removed 7/24/22  · Was followed by APS back in [facility], S/P epidural catheter which was removed 8/2  · Current pain regimen: Diaz Tylenol 975 Q8h, PO Dilaudid 2/4 for mod/severe, IV Dilaudid 0 5 mg Q2  · Bowel regimen:  Scheduled senna and MiraLax daily  · Plan to discontinue IV Dilaudid when Chest tube removed  Acute Respiratory insufficiency on chronic hypoxic respiratory failure  Assessment & Plan  · She is status post trach  Currently on 6-8L with sats in the high 90s, at home uses 10 L  · Status post recent right pneumothorax requiring chest tube and now with right empyema  · Continue aggressive respiratory protocol, p r n  suctioning  · Continue Xoponex and Atrovent per Pulm q6hrs  · Encourage out of bed, incentive spirometry  · Pulmonology following      CKD (chronic kidney disease) stage 3, GFR 30-59 ml/min ([facility])  Assessment & Plan  · MODESTO noted on [facility] with elevated Cr 1 64, now resolved  · Baseline Cr being 0 9-1 0  · Continue to monitor closely   Losartan on hold  · Avoid nephrotoxic agents     Anemia  Assessment & Plan    Results from last 7 days   Lab Units 08/07/22  0311 08/06/22  1837 08/06/22  1220 08/06/22  1118 08/06/22  1058 08/06/22  0951 08/06/22  0415   HEMOGLOBIN g/dL 7 7* 8 5* 9 4*  --   --   --  9 2*   I STAT HEMOGLOBIN g/dl  --   --   --  9 2* 8 2* 9 2*  --    HEMATOCRIT % 25 1* 27 8* 31 1*  --   --   --  31 0*   HEMATOCRIT, ISTAT %  --   --   --  27* 24* 27*  --      Most likely anemia of chronic disease due to prolonged illness  No active bleeding noted  Hg (8/4): 6 8 s/p  2 unit PRBC's   Hb stable, monitor closely  Consider transfusing if < 8 due to history of CAD  Continue Iron replacement as well as daily miralax     Tracheostomy in place [facility])  Assessment & Plan  · Trach placed in the context of cardiac arrest/prolonged ventilator dependent state November 2021  · Decannulated at [facility] 12/11/21  · Patient requires frequent tracheostomy changes and suctioning  · Per discussion with speech therapy,  video barium swallow was done for sense of food getting stuck  Appropriate for regular diet  · On trach collar O2 with humidification and tolerating well btw 6-8L     CAD (coronary artery disease)  Assessment & Plan  · STEMI 10/22/2021 s/p PATRICA mid circumflex OM1  OM2 was ballooned but not stented    · Pulseless VT 10/27/21 - repeat LHC 90% mid circumflex stenosis, but could not be intervened on  · VT 10/28/21 LHC:  found to have apical LAD complete occlusion was felt to be small to be intervened    · Cardiac carrest 1/1/22 - [facility] felt to be hypoxic vs  VT induced  · On metoprolol 50mg BID, Brilinta 90 mg BID and Lipitor 40 mg qd     A-fib [facility])  Assessment & Plan  Follows closely with Cardiology  Rhythm controlled with amiodarone 100 mg daily and AC with Eliquis 5 mg BID  Eliquis on Hold per cardiology due to procedure (VATS)    Type 2 diabetes mellitus with hyperglycemia [facility])  Assessment & Plan  Lab Results   Component Value Date    HGBA1C 12 7 (H) 05/22/2022       Recent Labs     08/06/22  1219 08/06/22  1607 08/06/22  2121 08/07/22  0611   POCGLU 223* 260* 249* 176* Uncontrolled per most recent A1c  Home regimen: Lantus 16U QHS and Novolog 4U TID  Current inpatient regimen: Lantus 30U qHS + Humalog 20U TID + SSI 4  Will continue for now and adjust as needed to maintain  - 180      Hypertension  Assessment & Plan  BP stable  On home losartan 50 mg BID, follows closely with Cardiology  Losartan on hold but can consider restarting as MODESTO has now resolved  Monitor BP closely     Hyperlipidemia associated with type 2 diabetes mellitus (HCC)  Assessment & Plan  - Continue Atorvastatin 40 mg daily  Diabetic peripheral neuropathy (HCC)  Assessment & Plan  Continue PTA Lyrica as documented     Anxiety  Assessment & Plan  On Lexapro 10 mg daily as well as Atarax prn           Subjective/Objective     Subjective:   Patient seen and examined at bedside  No overnight events and no concerns from nursing staff  Patient is now s/p VATS procedure tolerating it well but is complaining of pain on right side  She also continues to feel weak, but was on the chair this AM  She has been tolerating a diet with no issues  Last Bowel movement 2 days ago, but states that her baseline  She goes every 2-3day  No further acute concerns  Objective:  Vitals: Blood pressure 110/57, pulse 55, temperature 98 1 °F (36 7 °C), temperature source Oral, resp  rate 16, weight 81 6 kg (179 lb 12 8 oz), SpO2 97 %  ,Body mass index is 26 55 kg/m²        Intake/Output Summary (Last 24 hours) at 8/7/2022 0916  Last data filed at 8/7/2022 0601  Gross per 24 hour   Intake 4803 98 ml   Output 2965 ml   Net 1838 98 ml       Invasive Devices  Report    Peripheral Intravenous Line  Duration           Peripheral IV 08/06/22 Right Hand 1 day    Peripheral IV 08/06/22 Left Arm <1 day          Arterial Line  Duration           Arterial Line 08/06/22 Right Brachial 1 day          Drain  Duration           Urethral Catheter Latex 16 Fr  1 day    Chest Tube 1 Right Pleural 28 Fr  <1 day    Chest Tube 2 Right Pleural 28 Fr  <1 day          Airway  Duration           Surgical Airway Shiley Fenestrated 158 days                Physical Exam  Vitals and nursing note reviewed  Constitutional:       General: She is not in acute distress  Appearance: She is well-developed  She is ill-appearing  She is not toxic-appearing or diaphoretic  HENT:      Head: Normocephalic and atraumatic  Eyes:      Conjunctiva/sclera: Conjunctivae normal    Cardiovascular:      Rate and Rhythm: Normal rate and regular rhythm  Heart sounds: No murmur heard  Pulmonary:      Effort: Pulmonary effort is normal  No respiratory distress  Comments: Trach collar in place  Abdominal:      Palpations: Abdomen is soft  Tenderness: There is no abdominal tenderness  Comments: Right sided chest tubes in place  Area C/D/I   Musculoskeletal:      Cervical back: Neck supple  Right lower leg: No edema  Left lower leg: No edema  Skin:     General: Skin is warm and dry  Neurological:      Mental Status: She is alert  Mental status is at baseline  Psychiatric:         Mood and Affect: Mood normal          Behavior: Behavior normal          Thought Content: Thought content normal          Judgment: Judgment normal          Lab, Imaging and other studies: I have personally reviewed pertinent reports       Results from last 7 days   Lab Units 08/07/22  0311 08/06/22  1837 08/06/22  1220 08/06/22  0951 08/06/22  0415 08/03/22  0559 08/02/22  0634 08/01/22  2026 08/01/22  0457   WBC Thousand/uL 9 57  --  14 60*  --  7 44   < > 14 29*  --  17 46*   HEMOGLOBIN g/dL 7 7* 8 5* 9 4*  --  9 2*   < > 8 2*   < > 7 8*   I STAT HEMOGLOBIN   --   --   --    < >  --   --   --   --   --    HEMATOCRIT % 25 1* 27 8* 31 1*  --  31 0*   < > 27 4*   < > 27 8*   HEMATOCRIT, ISTAT   --   --   --    < >  --   --   --   --   --    PLATELETS Thousands/uL 423*  --  481*  --  573*   < > 625*  --  611*   NEUTROS PCT %  --   --   --   --  68  --   --   --   -- MONOS PCT %  --   --   --   --  11  --   --   --   --    MONO PCT %  --   --   --   --   --   --  8  --  5    < > = values in this interval not displayed  Results from last 7 days   Lab Units 08/07/22  0311 08/06/22  1220 08/06/22  1118 08/06/22  1058 08/06/22  0951 08/06/22  0951 08/06/22  0415 08/04/22  0454 08/03/22  0559 08/02/22  0634   POTASSIUM mmol/L 5 0 5 5*  --   --   --   --  5 3   < > 3 7 3 8   CHLORIDE mmol/L 104 106  --   --   --   --  101   < > 101 98   CO2 mmol/L 27 27  --   --   --   --  28   < > 26 27   CO2, I-STAT mmol/L  --   --  26 25   < > 30  --   --   --   --    BUN mg/dL 42* 38*  --   --   --   --  41*   < > 39* 35*   CREATININE mg/dL 1 32* 1 51*  --   --   --   --  1 68*   < > 1 34* 1 14   CALCIUM mg/dL 8 0* 7 7*  --   --   --   --  8 5   < > 8 9 8 5   ALK PHOS U/L  --   --   --   --   --   --   --   --  81 75   ALT U/L  --   --   --   --   --   --   --   --  18 7   AST U/L  --   --   --   --   --   --   --   --  33 16   GLUCOSE, ISTAT mg/dl  --   --  220* 201*  --  224*  --   --   --   --     < > = values in this interval not displayed  Results from last 7 days   Lab Units 08/07/22  0311 08/06/22  1220   MAGNESIUM mg/dL 2 1 1 6     Lab Results   Component Value Date    PHOS 4 6 (H) 07/18/2022    PHOS 3 9 07/17/2022    PHOS 4 2 07/16/2022      Results from last 7 days   Lab Units 08/06/22  0415 08/05/22 2029 08/05/22  1039 08/05/22  0147 08/04/22  1442   INR   --   --   --   --  1 28*   PTT seconds 37 82* 43*   < > 33    < > = values in this interval not displayed           0   Lab Value Date/Time    TROPONINI >40 00 (H) 10/24/2021 0839    TROPONINI >40 00 (H) 10/22/2021 1851    TROPONINI >40 00 (H) 10/22/2021 1442    TROPONINI <0 03 08/26/2021 0802    TROPONINI <0 03 04/18/2021 1413    TROPONINI <0 02 11/17/2020 1537    TROPONINI <0 03 07/26/2020 1821    TROPONINI <0 02 06/02/2020 2250    TROPONINI <0 02 06/02/2020 1952    TROPONINI <0 02 03/04/2019 2122    TROPONINI <0 02 10/07/2018 1516    TROPONINI <0 02 08/12/2018 0042    TROPONINI <0 02 08/11/2018 2058    TROPONINI <0 02 08/11/2018 1543    TROPONINI <0 02 08/10/2018 1912     ABG:  Lab Results   Component Value Date    PHART 7 473 (H) 05/11/2022    RDW0HIN 27 7 (LL) 05/11/2022    PO2ART 113 0 05/11/2022    SEX3UOX 19 8 (L) 05/11/2022    BEART -2 8 05/11/2022    SOURCE Radial, Right 05/11/2022       VTE Pharmacologic Prophylaxis: N/A    VTE Mechanical Prophylaxis: sequential compression device

## 2022-08-07 NOTE — PHYSICAL THERAPY NOTE
Physical Therapy Evaluation     Patient's Name: Jessica Chaudhry    Admitting Diagnosis  Empyema lung ([facility] ) [J86 [id number]]    Problem List  Patient Active Problem List   Diagnosis    Anxiety    Cardiac murmur    Carpal tunnel syndrome of left wrist    Change in bowel habit    Chronic pain disorder    Cough    Depression, recurrent (HCC)    Retinal hemorrhage due to secondary diabetes ([facility] )    Diabetic peripheral neuropathy ([facility])    Dizziness    Fibromyalgia    Gastroesophageal reflux disease with esophagitis    Hemangioma of bone    Hyperlipidemia associated with type 2 diabetes mellitus ([facility] )    Hypertension    Hypoestrogenism    Low back pain    Lumbar radiculopathy    Medically complex patient    Neuropathy    Non compliance with medical treatment    Noncompliance with diet and medication regimen    Overweight    Primary insomnia    Right-sided thoracic back pain    Sciatica of left side    Screening for colon cancer    Shingles    Thoracic radiculitis    Tobacco abuse    Type 2 diabetes mellitus with hyperglycemia ([facility])    Vertebral body hemangioma    Vertigo    Vaginal discharge    Yeast vaginitis    Recurrent vaginitis    Hypokalemia    Abnormal urinalysis    Thoracic aortic aneurysm without rupture ([facility])    Epigastric pain    Urinary tract infection with hematuria    Bacterial vaginosis    Abscess    Palpitations    Nasal vestibulitis    Orthopnea    CHANTALE (obstructive sleep apnea)    Diarrhea    Trigger finger, right middle finger    Trigger finger, right ring finger    Hypoglycemia    Polypharmacy    Cellulitis of left lower extremity    MODESTO (acute kidney injury) ([facility] )    History of Ventricular tachycardia ([facility])    A-fib ([facility])    Acute on chronic respiratory failure with hypoxia ([place] [id number] )    History of Cardiac arrest ([place] [address] )    Cerebral vascular accident [facility])    Cerebral aneurysm    Urinary retention    CAD (coronary artery disease)    Subglottic stenosis    Tracheostomy in place [facility])    Chronic hypoxemic respiratory failure (HCC)    Insomnia secondary to anxiety    Elevated troponin    Anemia    Shortness of breath    Prolonged Q-T interval on ECG    Thoracic aortic aneurysm, ruptured (HCC)    Hypomagnesemia    Chest pain    Moderate protein-calorie malnutrition (HCC)    Hemoptysis    Hyponatremia    CKD (chronic kidney disease) stage 3, GFR 30-59 ml/min (HCC)    Leukocytosis    Acute Respiratory insufficiency on chronic hypoxic respiratory failure    Pain at Chest tube insertion site  Empyema lung, Right    Chest wall wound infection    Acute on chronic heart failure with preserved ejection fraction [facility])       Past Medical History  Past Medical History:   Diagnosis Date    Anxiety     Aortic aneurysm (HCC)     Arthritis     Depression     Diabetes mellitus (HCC)     Fibromyalgia     GERD (gastroesophageal reflux disease)     GERD (gastroesophageal reflux disease)     H/O cardiovascular stress test 09/2018    no ischemia  EF 70%  H/O echocardiogram 01/2019    EF 60%  Mild LVH  trivial effusion  Hyperlipidemia     Hypertension     Migraines     Psychiatric disorder     anxiety    Uncontrolled hypertension 2/25/2015    Last Assessment & Plan:  BP today above goal <140/90, apparently asymptomatic  Prior BP elevations were attributed to not taking medication  Consider increased medication if persistent on f/u  Past Surgical History  Past Surgical History:   Procedure Laterality Date    BACK SURGERY      Lumbar epidural steroid injection    CARDIAC CATHETERIZATION N/A 10/22/2021    Procedure: Cardiac pci;  Surgeon: Joseph Porter MD;  [facility]; Service: Cardiology    CARDIAC CATHETERIZATION  10/22/2021    Procedure: Cardiac catheterization;  Surgeon: Joseph Porter MD;  [facility]; Service: Cardiology    CARDIAC CATHETERIZATION Left 10/27/2021    Procedure: Cardiac Left Heart Cath;  Surgeon: Merced Kelly MD;  [facility];   Service: Cardiology CARDIAC CATHETERIZATION N/A 10/27/2021    Procedure: Cardiac pci;  Surgeon: Nichole Ghotra MD;  [facility]; Service: Cardiology    CARDIAC CATHETERIZATION N/A 10/28/2021    Procedure: Cardiac pci;  Surgeon: Paulina Mckeon DO;  [facility]; Service: Cardiology    CARPAL TUNNEL RELEASE Left      SECTION      CHOLECYSTECTOMY      COLONOSCOPY      incomplete    COLONOSCOPY      EYE SURGERY      HYSTERECTOMY      Total    IR CHEST TUBE PLACEMENT  2022    IR CHEST TUBE PLACEMENT  2022    IR CHEST TUBE PLACEMENT  2022    OVARIAN CYST REMOVAL      PEG W/TRACHEOSTOMY PLACEMENT N/A 2021    Procedure: TRACHEOSTOMY WITH INSERTION PEG TUBE;  Surgeon: Dipika Fung DO;  Location: [facility]IN [facility];  Service: General    TUBAL LIGATION      UPPER GASTROINTESTINAL ENDOSCOPY          22 1038   PT Last Visit   PT Visit Date 22   Note Type   Note type Evaluation   Pain Assessment   Pain Assessment Tool 0-10   Pain Score No Pain   Restrictions/Precautions   Other Precautions Cognitive;Multiple lines;Telemetry;O2;Fall Risk;Contact/isolation  (a-line; trach; CT)   Home Living   Type of [address] [place] [address] Two level  (3 [address])   Home Equipment Walker; Wheelchair-manual  (home O2)   Prior Function   Level of [place] Needs assistance with ADLs and functional mobility  (? amb w/ rw)   Lives With [facility] Help From Family   General   Additional Pertinent History cleared for assessment (spoke to nsg)   Cognition   Overall Cognitive Status WFL   Arousal/Participation Responsive   Orientation Level   (responds to questions)   Memory Decreased recall of recent events;Decreased recall of precautions   Following Commands Follows one step commands with increased time or repetition   Subjective   Subjective Pt is resting in the chair; arousable but remains somnolent; agreeable to try mobilization   RUE Assessment   RUE Assessment WFL  (AROM)   LUE Assessment   LUE Assessment WFL  (AROM)   RLE Assessment   RLE Assessment WFL  (AROM)   Strength RLE   RLE Overall Strength   (fair/ fair + (grossly))   LLE Assessment   LLE Assessment WFL  (AROM)   Strength LLE   LLE Overall Strength   (fair/ fair + (grossly))   Transfers   Sit to Stand 3  Moderate assistance   Additional items Assist x 2; Increased time required;Verbal cues   Stand to Sit 3  Moderate assistance   Additional items Assist x 2; Increased time required;Verbal cues   Ambulation/Elevation   Gait pattern Not appropriate; Not tested   Assistive Device Rolling walker   Balance   Static Sitting Fair -   Dynamic Sitting Poor +   Static Standing Poor  (lists to the (L) side)   Dynamic Standing Poor -   Activity Tolerance   Activity Tolerance Patient limited by fatigue; Other (Comment)   Nurse Made Aware spoke to Vladimir Silva RN   Assessment   Prognosis Fair   Problem List Decreased strength;Decreased endurance; Impaired balance;Decreased mobility;Obesity   Assessment Pt is 65 y o  female admitted with hx of CP and Dx of Empyema lung, Acute on chronic heart failure with preserved ejection fraction, and Acute Respiratory insufficiency on chronic hypoxic respiratory failure  During the course, pt underwent R VATS complete decortication, washout, Bronchoscopy with tracheostomy tube exchange and Right T3 through T10 intercostal nerve block with Exparel on 8/6/2022  Pt 's comorbidities affecting POC include: Anxiety, Aortic aneurysm ([address] ), Arthritis, Depression, Diabetes mellitus ([address] ), Fibromyalgia, Hypertension, Migraines, tracheostomy,and Uncontrolled hypertension and personal factors of: FELTON and steps in the house   Pt's clinical presentation is currently unstable/unpredictable which is evident in ongoing telem monitoring w/ a-line in place, abn lab values, CT and inability to progress further w/ amb at this time due to limited standing balance/tolerance  Pt presents w/ generalized weakness, incl decreased LE strength, decreased functional endurance and activity tolerance, impaired balance, transfers difficulty and inability to amb at this time  Will cont to follow pt in PT for progressive mobilization to address above functional deficits and to max level of (I), endurance, and safety  Currently recommend rehab upon D/C  Will cont to follow until then  Barriers to Discharge Inaccessible home environment   Goals   Patient Goals pt did not express   STG Expiration Date 08/17/22   Short Term Goal #1 7-10 days  Pt will amb 50 ft w/ rw, (S)x1 in order to initiate return to at least household amb status  Pt will achieve (S) level w/ bed mob in order to facilitate safety with OOB and back to bed transitions in own living environment  Pt will perform transfers w/ (S)x1 to assure safety w/ functional mobility/transitions w/ all aspects of mobility/locomotion  Pt will participate in LE therex and balance activities to max progression w/ mobility skills  PT Treatment Day 0   Plan   Treatment/Interventions Functional transfer training;LE strengthening/ROM; Therapeutic exercise; Endurance training;Equipment eval/education; Bed mobility;Gait training;Spoke to nursing   PT Frequency Other (Comment)  (3-6x/wk)   Recommendation   PT Discharge Recommendation Post acute rehabilitation services   Equipment Recommended [address] Mobility Inpatient   Turning in Bed Without Bedrails 2   Lying on Back to Sitting on Edge of Flat Bed 2   Moving Bed to Chair 2   Standing Up From Chair 2   Walk in Room 1   Climb 3-5 Stairs 1   Basic Mobility Inpatient Raw Score 10   Turning Head Towards Sound 4   Follow Simple Instructions 3   Low Function Basic Mobility Raw Score 17   Low Function Basic Mobility Standardized Score 27 46   Highest Level Of Mobility   JH-HLM Goal 4: Move to chair/commode   JH-HLM Achieved 4: Move to chair/commode   Modified Vikas Scale   Modified [place] Scale 4   End of Consult   Patient Position at End of Consult Bedside chair; All needs within reach           [facility], PT

## 2022-08-07 NOTE — ASSESSMENT & PLAN NOTE
· Loculated right pleural effusion  S/P right-sided chest tube insertion and removal on 07/24  · CT of the chest on 7/28/22 revealed moderate partially loculated right pleural effusion  · Right chest tube re-placed on 7/29/22 by IR  · Pleural fluid analysis shows neutrophil predominant WBC count  · Fluid cultures from prior anterior chest tube site positive for MRSA  · 8/1 - CXR: Small component of right pleural effusion suspected which may be partially loculated  Indwelling right thoracostomy tube without pneumothorax  · 8/2 CT Chest:  Decreased size of a right-sided pleural effusion and improved aeration of the right lower lobe post placement of a right pleural drainage catheter, with unchanged appearance of a loculated component of the effusion superomedially  There is slightly  increased density of the effusion compatible with a small amount of blood products  2   Appearance of new inflammatory groundglass opacities in the left lower lobe  3   Mild background interstitial edema is unchanged  · 8/6 - S/P VATS Decortation  procedure     Plan:  - Continue Abx w/ Vanc given MRSA, Vanc will need to be continued through 8/16 per ID  - Maintain CT to suction and monitor output  - Aggressive pulmonary toilet  - Monitor fever curve and white count closely

## 2022-08-07 NOTE — QUICK NOTE
5:30pm: Saw patient who was sitting in chair eating lunch  Per nursing, pt seems much more tired since yesterday, day of VATS  Pt says she feels tired however has no other complaints including CP, SOB, Abdominal pain, N/V  Pt declines being fed her dinner and wants to do it herself  - Pt received a 500cc bolus at 1643  MAP on A-line was fluctuating 73-84  - Torprol and Lyrica were held      7:00pm: Notified by nurse that patient's SBP dropped to 80s and MAP below 65 once patient fell asleep in bed  Nursing reports that patient states she is tired but has no other complaints  Nursing also reports pt has thicker, yellow tracheal secretions  I ordered 500cc Isolyte and midodrine 5 mg  Saw and evaluated the patient at bedside  PT denies any pain, SOB  She denies feeling worse than when I last saw her around 5:30pm  Oxygenation is at baseline  Pt is interactive and responsive, however, she does endorse sleepiness and falls asleep when not spoken too  MAP is fluctuating 60-05  Other vitals are stable  Assessment:  - Pt had acute blood loss at VATS procedure and is not eating well for about 48hrs  Hypotension is most likely due to Hypovolemia  Plan  - Pt receiving 500cc isolyte  - Midodrine 5 mg once  - Ordered STAT CBC, CMP, Ammonia, Lactic, ABG    - STAT CXR  - 1 unit of pRBCs  - Critical Care consulted  - Assess response to pRBCs  - if pt is not responding well to blood or is requiring pressors, will transfer to ICU

## 2022-08-08 LAB
ABO GROUP BLD BPU: NORMAL
ABO GROUP BLD BPU: NORMAL
ANION GAP SERPL CALCULATED.3IONS-SCNC: 4 MMOL/L (ref 4–13)
ATRIAL RATE: 67 BPM
ATRIAL RATE: 67 BPM
BASOPHILS # BLD AUTO: 0.01 THOUSANDS/ΜL (ref 0–0.1)
BASOPHILS NFR BLD AUTO: 0 % (ref 0–1)
BPU ID: NORMAL
BPU ID: NORMAL
BUN SERPL-MCNC: 41 MG/DL (ref 5–25)
CALCIUM SERPL-MCNC: 8.1 MG/DL (ref 8.3–10.1)
CHLORIDE SERPL-SCNC: 105 MMOL/L (ref 96–108)
CO2 SERPL-SCNC: 29 MMOL/L (ref 21–32)
CREAT SERPL-MCNC: 1.34 MG/DL (ref 0.6–1.3)
CROSSMATCH: NORMAL
CROSSMATCH: NORMAL
EOSINOPHIL # BLD AUTO: 0.02 THOUSAND/ΜL (ref 0–0.61)
EOSINOPHIL NFR BLD AUTO: 0 % (ref 0–6)
ERYTHROCYTE [DISTWIDTH] IN BLOOD BY AUTOMATED COUNT: 18.5 % (ref 11.6–15.1)
GFR SERPL CREATININE-BSD FRML MDRD: 44 ML/MIN/1.73SQ M
GLUCOSE SERPL-MCNC: 103 MG/DL (ref 65–140)
GLUCOSE SERPL-MCNC: 109 MG/DL (ref 65–140)
GLUCOSE SERPL-MCNC: 149 MG/DL (ref 65–140)
GLUCOSE SERPL-MCNC: 169 MG/DL (ref 65–140)
GLUCOSE SERPL-MCNC: 229 MG/DL (ref 65–140)
GLUCOSE SERPL-MCNC: 394 MG/DL (ref 65–140)
GLUCOSE SERPL-MCNC: 82 MG/DL (ref 65–140)
GLUCOSE SERPL-MCNC: 96 MG/DL (ref 65–140)
HCT VFR BLD AUTO: 26.1 % (ref 34.8–46.1)
HGB BLD-MCNC: 8.4 G/DL (ref 11.5–15.4)
IMM GRANULOCYTES # BLD AUTO: 0.05 THOUSAND/UL (ref 0–0.2)
IMM GRANULOCYTES NFR BLD AUTO: 1 % (ref 0–2)
LYMPHOCYTES # BLD AUTO: 1.5 THOUSANDS/ΜL (ref 0.6–4.47)
LYMPHOCYTES NFR BLD AUTO: 17 % (ref 14–44)
MCH RBC QN AUTO: 26.4 PG (ref 26.8–34.3)
MCHC RBC AUTO-ENTMCNC: 32.2 G/DL (ref 31.4–37.4)
MCV RBC AUTO: 82 FL (ref 82–98)
MONOCYTES # BLD AUTO: 1 THOUSAND/ΜL (ref 0.17–1.22)
MONOCYTES NFR BLD AUTO: 11 % (ref 4–12)
NEUTROPHILS # BLD AUTO: 6.31 THOUSANDS/ΜL (ref 1.85–7.62)
NEUTS SEG NFR BLD AUTO: 71 % (ref 43–75)
NRBC BLD AUTO-RTO: 0 /100 WBCS
P AXIS: 47 DEGREES
P AXIS: 60 DEGREES
PLATELET # BLD AUTO: 373 THOUSANDS/UL (ref 149–390)
PMV BLD AUTO: 9.9 FL (ref 8.9–12.7)
POTASSIUM SERPL-SCNC: 4.4 MMOL/L (ref 3.5–5.3)
PR INTERVAL: 166 MS
PR INTERVAL: 180 MS
QRS AXIS: -50 DEGREES
QRS AXIS: -52 DEGREES
QRSD INTERVAL: 126 MS
QRSD INTERVAL: 128 MS
QT INTERVAL: 472 MS
QT INTERVAL: 476 MS
QTC INTERVAL: 498 MS
QTC INTERVAL: 502 MS
RBC # BLD AUTO: 3.18 MILLION/UL (ref 3.81–5.12)
SODIUM SERPL-SCNC: 138 MMOL/L (ref 135–147)
T WAVE AXIS: 3 DEGREES
T WAVE AXIS: 4 DEGREES
UNIT DISPENSE STATUS: NORMAL
UNIT DISPENSE STATUS: NORMAL
UNIT PRODUCT CODE: NORMAL
UNIT PRODUCT CODE: NORMAL
UNIT PRODUCT VOLUME: 350 ML
UNIT PRODUCT VOLUME: 350 ML
UNIT RH: NORMAL
UNIT RH: NORMAL
VENTRICULAR RATE: 67 BPM
VENTRICULAR RATE: 67 BPM
WBC # BLD AUTO: 8.89 THOUSAND/UL (ref 4.31–10.16)

## 2022-08-08 PROCEDURE — 93010 ELECTROCARDIOGRAM REPORT: CPT | Performed by: INTERNAL MEDICINE

## 2022-08-08 PROCEDURE — 93005 ELECTROCARDIOGRAM TRACING: CPT

## 2022-08-08 PROCEDURE — 97116 GAIT TRAINING THERAPY: CPT

## 2022-08-08 PROCEDURE — 82948 REAGENT STRIP/BLOOD GLUCOSE: CPT

## 2022-08-08 PROCEDURE — 85025 COMPLETE CBC W/AUTO DIFF WBC: CPT

## 2022-08-08 PROCEDURE — 97110 THERAPEUTIC EXERCISES: CPT

## 2022-08-08 PROCEDURE — 99024 POSTOP FOLLOW-UP VISIT: CPT | Performed by: THORACIC SURGERY (CARDIOTHORACIC VASCULAR SURGERY)

## 2022-08-08 PROCEDURE — 80048 BASIC METABOLIC PNL TOTAL CA: CPT

## 2022-08-08 PROCEDURE — 99232 SBSQ HOSP IP/OBS MODERATE 35: CPT | Performed by: INTERNAL MEDICINE

## 2022-08-08 PROCEDURE — 94760 N-INVAS EAR/PLS OXIMETRY 1: CPT

## 2022-08-08 PROCEDURE — 97167 OT EVAL HIGH COMPLEX 60 MIN: CPT

## 2022-08-08 PROCEDURE — 99232 SBSQ HOSP IP/OBS MODERATE 35: CPT | Performed by: FAMILY MEDICINE

## 2022-08-08 RX ORDER — BUMETANIDE 2 MG/1
2 TABLET ORAL DAILY
Status: DISCONTINUED | OUTPATIENT
Start: 2022-08-09 | End: 2022-08-13

## 2022-08-08 RX ADMIN — HYDROMORPHONE HYDROCHLORIDE 4 MG: 2 TABLET ORAL at 23:13

## 2022-08-08 RX ADMIN — ATORVASTATIN CALCIUM 40 MG: 40 TABLET, FILM COATED ORAL at 17:36

## 2022-08-08 RX ADMIN — HYDROMORPHONE HYDROCHLORIDE 4 MG: 2 TABLET ORAL at 09:54

## 2022-08-08 RX ADMIN — PANTOPRAZOLE SODIUM 40 MG: 40 TABLET, DELAYED RELEASE ORAL at 06:12

## 2022-08-08 RX ADMIN — INSULIN LISPRO 20 UNITS: 100 INJECTION, SOLUTION INTRAVENOUS; SUBCUTANEOUS at 17:39

## 2022-08-08 RX ADMIN — HYDROMORPHONE HYDROCHLORIDE 4 MG: 2 TABLET ORAL at 04:26

## 2022-08-08 RX ADMIN — TICAGRELOR 90 MG: 90 TABLET ORAL at 14:00

## 2022-08-08 RX ADMIN — VANCOMYCIN HYDROCHLORIDE 1250 MG: 10 INJECTION, POWDER, LYOPHILIZED, FOR SOLUTION INTRAVENOUS at 14:12

## 2022-08-08 RX ADMIN — INSULIN LISPRO 4 UNITS: 100 INJECTION, SOLUTION INTRAVENOUS; SUBCUTANEOUS at 12:39

## 2022-08-08 RX ADMIN — SPIRONOLACTONE 100 MG: 50 TABLET ORAL at 08:49

## 2022-08-08 RX ADMIN — DIAZEPAM 5 MG: 5 TABLET ORAL at 20:30

## 2022-08-08 RX ADMIN — ACETAMINOPHEN 975 MG: 325 TABLET ORAL at 06:12

## 2022-08-08 RX ADMIN — INSULIN LISPRO 20 UNITS: 100 INJECTION, SOLUTION INTRAVENOUS; SUBCUTANEOUS at 12:38

## 2022-08-08 RX ADMIN — TICAGRELOR 90 MG: 90 TABLET ORAL at 14:12

## 2022-08-08 RX ADMIN — PREGABALIN 75 MG: 75 CAPSULE ORAL at 08:49

## 2022-08-08 RX ADMIN — ESCITALOPRAM OXALATE 10 MG: 10 TABLET ORAL at 08:49

## 2022-08-08 RX ADMIN — ACETAMINOPHEN 975 MG: 325 TABLET ORAL at 00:16

## 2022-08-08 RX ADMIN — BUMETANIDE 2 MG: 2 TABLET ORAL at 08:49

## 2022-08-08 RX ADMIN — INSULIN LISPRO 2 UNITS: 100 INJECTION, SOLUTION INTRAVENOUS; SUBCUTANEOUS at 17:36

## 2022-08-08 RX ADMIN — PREGABALIN 25 MG: 25 CAPSULE ORAL at 17:36

## 2022-08-08 RX ADMIN — TICAGRELOR 90 MG: 90 TABLET ORAL at 21:48

## 2022-08-08 RX ADMIN — METOPROLOL SUCCINATE 50 MG: 50 TABLET, EXTENDED RELEASE ORAL at 17:36

## 2022-08-08 RX ADMIN — AMIODARONE HYDROCHLORIDE 100 MG: 200 TABLET ORAL at 08:49

## 2022-08-08 NOTE — QUICK NOTE
Called by family medicine to evaluate patient due to mild hypotension and the increased lethargy  Patient underwent VATS procedure yesterday due to MRSA empyema  Patient has a history trach due to cardiac arrest in November  She had been stable throughout her hospital stay; however, after her procedure, her blood pressure has been on the lower side  Patient was made step-down 1 by thoracic surgery following the procedure, but this does not seem to have been related to critical care before now  I reviewed case and discussed with Dr Tanesha Kapoor  Overall, it appears the patient is volume depleted  Prior to being transferred to [place], she was aggressively diuresed at [facility] and was 12 L negative at time of discharge  Patient had an additional 900 mL estimated blood loss during her procedure  Failure medicine as ordered 1 unit of blood along with the fluids  They have also ordered labs to evaluate for any other possible causes of hypertension  I believe that the management at this time is appropriate and there is nothing else that we would have to offer from a critical care standpoint  Assuming the patient responds to the a plan of the volume resuscitation, I feel that it is appropriate to stay and the family medicine service  If she does not, may need to upgrade level of care and start vasopressors  Ultimately, a line is likely the only reason the patient is still a step-down level 1  Given that she has a very difficult stick, I would argue to leave the a line in for blood draws until a PICC line can be placed tomorrow  Ultimately, patient required an additional 500 mL of albumin to which she responded very well  Given that her pressure stable I feel that it is completely appropriate for this patient to remain on the [facility] Medicine Service  Critical Care will be available if there should be any problems or decline in the patient's condition

## 2022-08-08 NOTE — OCCUPATIONAL THERAPY NOTE
Occupational Therapy Evaluation     Patient Name: Brittanie Mcgowan  NQMAE'R Date: 8/8/2022  Problem List  Principal Problem:    Empyema lung, Right  Active Problems:    Anxiety    Diabetic peripheral neuropathy (HCC)    Hyperlipidemia associated with type 2 diabetes mellitus ([facility] [address] )    Hypertension    Type 2 diabetes mellitus with hyperglycemia (HCC)    A-fib ([facility])    CAD (coronary artery disease)    Tracheostomy in place (HCC)    Anemia    CKD (chronic kidney disease) stage 3, GFR 30-59 ml/min ([facility])    Acute Respiratory insufficiency on chronic hypoxic respiratory failure    Pain at Chest tube insertion site  Chest wall wound infection    Acute on chronic heart failure with preserved ejection fraction [facility])    Past Medical History  Past Medical History:   Diagnosis Date    Anxiety     Aortic aneurysm ([facility])     Arthritis     Depression     Diabetes mellitus ([facility])     Fibromyalgia     GERD (gastroesophageal reflux disease)     GERD (gastroesophageal reflux disease)     H/O cardiovascular stress test 09/2018    no ischemia  EF 70%   H/O echocardiogram 01/2019    EF 60%  Mild LVH  trivial effusion   Hyperlipidemia     Hypertension     Migraines     Psychiatric disorder     anxiety    Uncontrolled hypertension 2/25/2015    Last Assessment & Plan:  BP today above goal <140/90, apparently asymptomatic  Prior BP elevations were attributed to not taking medication  Consider increased medication if persistent on f/u  Past Surgical History  Past Surgical History:   Procedure Laterality Date    BACK SURGERY      Lumbar epidural steroid injection    CARDIAC CATHETERIZATION N/A 10/22/2021    Procedure: Cardiac pci;  Surgeon: Rosario Olmos MD;  [facility] C[facility]; Service: Cardiology    CARDIAC CATHETERIZATION  10/22/2021    Procedure: Cardiac catheterization;  Surgeon: Rosario Olmos MD;  [facility] C[facility];   Service: Cardiology    CARDIAC CATHETERIZATION Left 10/27/2021    Procedure: Cardiac Left Heart Cath;  Surgeon: Colby Su MD;  [facility] CARD[facility]; Service: Cardiology    CARDIAC CATHETERIZATION N/A 10/27/2021    Procedure: Cardiac pci;  Surgeon: Colby Su MD;  [facility] CARD[facility]; Service: Cardiology    CARDIAC CATHETERIZATION N/A 10/28/2021    Procedure: Cardiac pci;  Surgeon: Sebastian Jalloh DO;  [facility] CARD[facility]; Service: Cardiology    CARPAL TUNNEL RELEASE Left      SECTION      CHOLECYSTECTOMY      COLONOSCOPY      incomplete    COLONOSCOPY      EYE SURGERY      HYSTERECTOMY      Total    IR CHEST TUBE PLACEMENT  2022    IR CHEST TUBE PLACEMENT  2022    IR CHEST TUBE PLACEMENT  2022    LUNG DECORTICATION Right 2022    Procedure: DECORTICATION LUNG;  Surgeon: Ella Del Rio MD;  Location: [facility] MAIN OR;  Service: Thoracic    OVARIAN CYST REMOVAL      PEG W/TRACHEOSTOMY PLACEMENT N/A 2021    Procedure: TRACHEOSTOMY WITH INSERTION PEG TUBE;  Surgeon: Yo Fung DO;  Location: [facility] MAIN OR;  Service: General    THORACOSCOPY VIDEO ASSISTED SURGERY (VATS) Right 2022    Procedure: THORACOSCOPY VIDEO ASSISTED SURGERY (VATS), RIGHT;  Surgeon: Ella Del Rio MD;  Location: [facility] MAIN OR;  Service: Thoracic    TUBAL LIGATION      UPPER GASTROINTESTINAL ENDOSCOPY             22   OT Last Visit   OT Visit Date 22   Note Type   Note type Evaluation   Restrictions/Precautions   Other Precautions Chair Alarm;Cognitive;Multiple lines;O2;Telemetry; Fall Risk  (trach collar; CT)   Pain Assessment   Pain Assessment Tool FLACC   Pain Location/Orientation Location: Back   Pain Rating: FLACC (Rest) - Face 1   Pain Rating: FLACC (Rest) - Legs 0   Pain Rating: FLACC (Rest) - Activity 0   Pain Rating: FLACC (Rest) - Cry 0   Pain Rating: FLACC (Rest) - Consolability 0   Score: FLACC (Rest) 1   Pain Rating: FLACC (Activity) - Face 1   Pain Rating: FLACC (Activity) - Legs 0   Pain Rating: FLACC (Activity) - Activity 0   Pain Rating: FLACC (Activity) - Cry 0   Pain Rating: FLACC (Activity) - Consolability 0   Score: FLACC (Activity) 1   Home Living   Type of [address] [place] [address] Two level;Stairs to enter with rails   Bathroom Shower/Tub Walk-in shower   Bathroom Toilet Standard   Bathroom Equipment Shower chair;Commode   [address] [facility]; Wheelchair-electric   Additional Comments Pt lives in a 2 story home with 2 FELTON  Prior Function   Level of [place] Independent with ADLs and functional mobility; Needs assistance with IADLs   Lives With Family   Receives Help From Family   ADL Assistance Independent   IADLs Needs assistance   Vocational On disability   Lifestyle   Autonomy Pt reports being I with ADLS, requires A with IADLS and uses RW for mobility PTA  Reciprocal Relationships Pt lives with supportive family who are able to assist   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, was a HHA   Intrinsic Gratification Spending time with her son   ADL   Where Assessed Edge of bed   Eating Assistance 5  Supervision/Setup   Grooming Assistance 5  Supervision/Setup   UB Bathing Assistance 4  Minimal Assistance   LB [address] 3  Moderate Assistance   [address] 4  Minimal Assistance    [facility] [address] 3  Moderate [address]  3  Moderate Assistance   Bed Mobility   Supine to Sit 3  Moderate assistance   Additional items Assist x 1; Increased time required;Verbal cues;LE management   Additional Comments Pt able to sit EOB with supervision for trunk control  After OT session pt left seated EOB working with PT  Transfers   Sit to Stand 3  Moderate assistance   Additional items Assist x 1; Increased time required;Verbal cues   Stand to Sit 3  Moderate assistance   Additional items Assist x 1; Increased time required;Verbal cues   Functional Mobility   Functional Mobility 3  Moderate assistance   Additional Comments Pt was able to take a few steps forward and back in front of bed with RW  Additional items Rolling walker   Balance   Static Sitting Fair   Dynamic Sitting Fair -   Static Standing Poor +   Dynamic Standing Poor   Ambulatory Poor   Activity Tolerance   Activity Tolerance Patient limited by fatigue   Nurse Made Aware RN confirmed okay to see pt   Cognition   Overall Cognitive Status [facility]   Arousal/Participation Alert; Cooperative   Attention Attends with cues to redirect   Orientation Level Oriented X4   Memory Decreased recall of precautions   Following Commands Follows one step commands without difficulty   Comments Pt presents with very flat affect but answers all questions appropriately  Able to communicate by mouthing words or nodding yes and no  Assessment   Limitation Decreased ADL status; Decreased endurance;Decreased self-care trans;Decreased high-level ADLs   Prognosis Good   Assessment Pt is a 65 y o  female admitted to [facility] on 8/2/2022 w/ right lung empyema s/p right lung decortication VATS on 8/6/2  Pt  has a past medical history of trach, Anxiety, Aortic aneurysm ([facility] [address] ), Arthritis, Depression, Diabetes mellitus ([facility] [address] ), Fibromyalgia, GERD (gastroesophageal reflux disease), GERD (gastroesophageal reflux disease), H/O cardiovascular stress test (09/2018), H/O echocardiogram (01/2019), Hyperlipidemia, Hypertension, Migraines, Psychiatric disorder, and Uncontrolled hypertension (2/25/2015)  Pt with active OT orders and up and OOB as tolerated orders  Pt resides in a 2 story home with 3 FELTON  Pt lives with supportive family who are able to assist upon d/c  Pt reports that she was I w/  ADLS, required assist with IADLS, & required use of RW PTA  Currently pt is mod A for functional transfers and functional mobility, min A for UB ADLS and mod A for LB ADLS   Pt is limited at this time 2*: pain, endurance, activity tolerance, functional mobility, forward functional reach, functional standing tolerance and decreased I w/ ADLS/IADLS  The following Occupational Performance Areas to address include: grooming, bathing/shower, toilet hygiene, dressing, functional mobility and clothing management  Based on the aforementioned OT evaluation, functional performance deficits, and assessments, pt has been identified as a high complexity evaluation  From OT standpoint, anticipate d/c STR  Pt to continue to benefit from acute immediate OT services to address the following goals 3-5x/week to  w/in 10-14 days: Micheal Sung   LTG Time Frame 10-   Long Term Goal #1 See goals below   Plan   Treatment Interventions ADL retraining;Functional transfer training;UE strengthening/ROM; Endurance training;Patient/family training; Compensatory technique education;Continued evaluation; Energy conservation; Activityengagement   Goal Expiration Date 22   OT Frequency 3-5x/wk   Recommendation   OT Discharge Recommendation Post acute rehabilitation services   AM-PAC Daily Activity Inpatient   Lower Body Dressing 2   Bathing 2   Toileting 2   Upper Body Dressing 3   Grooming 4   Eating 4   Daily Activity Raw Score 17   Daily Activity Standardized Score (Calc for Raw Score >=11) 37 26   AM-PAC Applied Cognition Inpatient   Following a Speech/Presentation 3   Understanding Ordinary Conversation 4   Taking Medications 3   Remembering Where Things Are Placed or Put Away 3   Remembering List of 4-5 Errands 3   Taking Care of Complicated Tasks 3   Applied Cognition Raw Score 19   Applied Cognition Standardized Score 39 77   Modified [place] Scale   Modified [place] Scale 4       GOALS    1) Pt will increase activity tolerance to G for 30 min txment sessions    2) Pt will complete UB/LB dressing/self care w/ mod I using adaptive device and DME as needed    3) Pt will complete bathing w/ Mod I w/ use of AE and DME as needed    4) Pt will complete toileting w/ mod I w/ G hygiene/thoroughness using DME as needed    5) Pt will improve functional transfers to Mod I on/off all surfaces using DME as needed w/ G balance/safety     6) Pt will improve functional mobility during ADL/IADL/leisure tasks to Mod I using DME as needed w/ G balance/safety     7) Pt will demonstrate G carryover of pt/caregiver education and training as appropriate  8) Pt will demonstrate 100% carryover of energy conservation techniques t/o functional I/ADL/leisure tasks w/o cues s/p skilled education    9) Pt will independently identify and utilize 2-3 coping strategies to increase positive affect and promote overall well-being      10) Pt will engage in ongoing cognitive assessment w/ G participation to assist w/ safe d/c planning/recommendations (as needed)    Bettye Carlton, MOT, OTR/L

## 2022-08-08 NOTE — ASSESSMENT & PLAN NOTE
Lab Results   Component Value Date    HGBA1C 12 7 (H) 05/22/2022       Recent Labs     08/07/22  1735 08/07/22  1859 08/07/22 2048 08/08/22  0014   POCGLU 95 77 79 109     Uncontrolled per most recent A1c  Home regimen: Lantus 16U QHS and Novolog 4U TID  Current inpatient regimen: Lantus 30U qHS + Humalog 20U TID + SSI 4  Will continue for now and adjust as needed to maintain  - 180

## 2022-08-08 NOTE — ARC ADMISSION
Referral received for consideration of patient for inpatient acute rehab  Will review patient's case with [facility] physician and update CM as able

## 2022-08-08 NOTE — ASSESSMENT & PLAN NOTE
· MODESTO noted on initial admission to [facility] with elevated Cr 1 64, now resolved  · Baseline Cr being 0 9-1 0  · Continue to monitor closely   Losartan on hold  · Avoid nephrotoxic agents

## 2022-08-08 NOTE — ASSESSMENT & PLAN NOTE
· Trach placed in the context of cardiac arrest/prolonged ventilator dependent state November 2021  · Decannulated at [facility] 12/11/21  · Patient requires frequent tracheostomy changes and suctioning  · Per discussion with speech therapy,  video barium swallow was done for sense of food getting stuck   Appropriate for regular diet  · On trach collar O2 with humidification and tolerating well btw 6-8L

## 2022-08-08 NOTE — PROGRESS NOTES
Progress Note - Infectious Disease   Kelli Red 65 y o  female MRN: [id number]  Unit/Bed#: [facility] 429-01 Encounter: [id number]      Impression/Recommendations:  1  Probable right-sided empyema   CT show loculated right pleural effusion   Patient is status post placement of chest tube on 07/29, with pleural fluid parameters consistent with empyema   Culture has no growth, likely due to prior antibiotic   Given MRSA in right chest wound, MRSA is likely pathogen in empyema   Despite chest tube drainage, repeat chest CT on 08/02 showed persistent and unchanged loculated empyema  Patient is now status post VATS/decortication  Continue IV vancomycin  Monitor temperature/WBC  Follow-up operative culture  Continue chest tube drainage per thoracic surgery  Treat x 2 weeks after VATS, through 8/20      2  Developing sepsis, with leukocytosis and tachypnea, likely secondary to empyema above   Patient is clinically and systemically improved   WBC has normalized   Tachypnea resolved  Ovi Cage Antibiotic plan as in below  Monitor temperature/WBC  Monitor hemodynamics      3  MRSA right chest wall infection, as site recent/prior chest tube placement for spontaneous pneumothorax  Antibiotic plan as in above  Serial exams      4  Acute on chronic hypoxic respiratory failure, likely multifactorial   Respiratory culture with MRSA and Pseudomonas but no obvious pneumonia   These isolates are most likely airway colonization  Management per primary service      5  Recent spontaneous pneumothorax, status post chest tube placement on 07/20   This was removed on 07/24      6  DM, type 2, poorly controlled, with hyperglycemia and elevated hemoglobin A1c   This is risk factor for infection above  Management per primary service      7  MODESTO   Creatinine stable  Antibiotic dosages adjusted accordingly    Monitor creatinine      Discussed with patient in detail regarding the above plan      Antibiotics:  Vancomycin # 13  POD # 2     Subjective:  Patient with right-sided chest wall pain, controlled  Temperature stays down   No chills  She is tolerating antibiotic well   No nausea, vomiting or diarrhea      Objective:  Vitals:  Temp:  [98 4 °F (36 9 °C)-98 6 °F (37 °C)] 98 4 °F (36 9 °C)  HR:  [54-62] 62  Resp:  [14-20] 18  BP: ()/(47-72) 137/72  SpO2:  [93 %-98 %] 95 %  Temp (24hrs), Av 5 °F (36 9 °C), Min:98 4 °F (36 9 °C), Max:98 6 °F (37 °C)  Current: Temperature: 98 4 °F (36 9 °C)    Physical Exam:     General: Awake, alert, cooperative, no distress  Neck:  Supple  No mass  No lymphadenopathy  Lungs: Decreased breath sounds on right, sparse right basilar rales, no wheezing, respirations unlabored  Heart:  Regular rate and rhythm, S1 and S2 normal, no murmur  Abdomen: Soft, nondistended, non-tender, bowel sounds active all four quadrants, no masses, no organomegaly  Extremities: No edema  No erythema/warmth  No ulcer  Nontender to palpation  Skin:  No rash  Neuro: Moves all extremities  Invasive Devices  Report    Peripheral Intravenous Line  Duration           Peripheral IV 22 Proximal;Right;Ventral (anterior) Forearm 4 days    Peripheral IV 22 Left Arm 2 days    Peripheral IV 22 Right Hand 2 days          Arterial Line  Duration           Arterial Line 22 Right Brachial 2 days          Drain  Duration           Chest Tube 1 Right Pleural 28 Fr  2 days    Chest Tube 2 Right Pleural 28 Fr  2 days    Urethral Catheter Latex 16 Fr  2 days          Airway  Duration           Surgical Airway Shiley Fenestrated 159 days                Labs studies:   I have personally reviewed pertinent labs    Results from last 7 days   Lab Units 22  0435 22  1856 22  0311 22  1220 22  1118 22  1058 22  0951 22  0454 22  0559 22  0634   POTASSIUM mmol/L 4 4 4 4 5 0   < >  --   --   --    < > 3 7 3 8   CHLORIDE mmol/L 105 102 104   < >  --   -- --    < > 101 98   CO2 mmol/L 29 28 27   < >  --   --   --    < > 26 27   CO2, I-STAT mmol/L  --   --   --   --  26 25 30   < >  --   --    BUN mg/dL 41* 43* 42*   < >  --   --   --    < > 39* 35*   CREATININE mg/dL 1 34* 1 39* 1 32*   < >  --   --   --    < > 1 34* 1 14   EGFR ml/min/1 73sq m 44 42 45   < >  --   --   --    < > 44 53   GLUCOSE, ISTAT mg/dl  --   --   --   --  220* 201* 224*  --   --   --    CALCIUM mg/dL 8 1* 7 7* 8 0*   < >  --   --   --    < > 8 9 8 5   AST U/L  --  19  --   --   --   --   --   --  33 16   ALT U/L  --  11*  --   --   --   --   --   --  18 7   ALK PHOS U/L  --  56  --   --   --   --   --   --  81 75    < > = values in this interval not displayed  Results from last 7 days   Lab Units 08/08/22  0435 08/07/22  1856 08/07/22  1423 08/07/22  0311   WBC Thousand/uL 8 89 11 08*  --  9 57   HEMOGLOBIN g/dL 8 4* 7 7* 8 2* 7 7*   PLATELETS Thousands/uL 373 387  --  423*     Results from last 7 days   Lab Units 08/06/22  1011 08/06/22  1007   GRAM STAIN RESULT  Rare Polys  No bacteria seen 1+ Polys  No organisms seen   BODY FLUID CULTURE, STERILE  No growth  --        Imaging Studies:   I have personally reviewed pertinent imaging study reports and images in PACS  EKG, Pathology, and Other Studies:   I have personally reviewed pertinent reports

## 2022-08-08 NOTE — PLAN OF CARE
Problem: PHYSICAL THERAPY ADULT  Goal: Performs mobility at highest level of function for planned discharge setting  See evaluation for individualized goals  Description: Treatment/Interventions: Functional transfer training, LE strengthening/ROM, Therapeutic exercise, Endurance training, Equipment eval/education, Bed mobility, Gait training, Spoke to nursing  Equipment Recommended: Maureen Acuña       See flowsheet documentation for full assessment, interventions and recommendations  Outcome: Progressing  Note: Prognosis: Good  Problem List: Decreased strength, Decreased endurance, Impaired balance, Decreased mobility, Pain  Assessment: Pt seen for PT session today with a focus on functional transfers, gait, endurance, and LE strengthening  Pt has made good progress since previous session requiring less assistance with all aspects of mobility  Pt was able to perform supine <> sit and STS transfers with Ax1  During STS transfers, pt demonstrates very forward flexed posture requiring increased time to achieve full stand  Once up, pt was able to ambulate short distances with RW  Pt with limited endurance, fatiguing quickly between bouts of activity requiring rest breaks to recover  Pt continues to demonstrate L lateral lean in sitting, resting on elevated HOB, but no lean observed in standing  Reviewed seated therex program with pt, all of which she was able to complete with good technique  Pt would benefit from continued acute skilled PT to address above deficits  Continuing to recommend rehab upon d/c   Barriers to Discharge: Inaccessible home environment     PT Discharge Recommendation: Post acute rehabilitation services    See flowsheet documentation for full assessment

## 2022-08-08 NOTE — PHYSICAL THERAPY NOTE
Physical Therapy Treatment Note    Patient's Name: Pete Cervantes  : 22 0939   PT Last Visit   PT Visit Date 22   Note Type   Note Type Treatment   Pain Assessment   Pain Assessment Tool FLACC   Pain Location/Orientation Location: Back   Hospital Pain Intervention(s) Repositioned; Ambulation/increased activity   Pain Rating: FLACC (Rest) - Face 1   Pain Rating: FLACC (Rest) - Legs 0   Pain Rating: FLACC (Rest) - Activity 0   Pain Rating: FLACC (Rest) - Cry 0   Pain Rating: FLACC (Rest) - Consolability 0   Score: FLACC (Rest) 1   Pain Rating: FLACC (Activity) - Face 1   Pain Rating: FLACC (Activity) - Legs 0   Pain Rating: FLACC (Activity) - Activity 0   Pain Rating: FLACC (Activity) - Cry 0   Pain Rating: FLACC (Activity) - Consolability 0   Score: FLACC (Activity) 1   Restrictions/Precautions   Weight Bearing Precautions Per Order No   Other Precautions Chair Alarm; Bed Alarm;Multiple lines;Telemetry; Fall Risk  (trach collar)   General   Chart Reviewed Yes   Family/Caregiver Present No   Cognition   Overall Cognitive Status WFL   Orientation Level Oriented X4   Following Commands Follows one step commands with increased time or repetition   Comments pt able to communicate by nodding or mouthing words, presents with a flat affect   Bed Mobility   Supine to Sit 3  Moderate assistance   Additional items Assist x 1;HOB elevated; Increased time required;Verbal cues;LE management   Sit to Supine 3  Moderate assistance   Additional items Assist x 1; Increased time required;LE management;Verbal cues   Additional Comments Pt able to sit EOB at supervision level   Transfers   Sit to Stand 3  Moderate assistance   Additional items Assist x 1; Increased time required;Verbal cues   Stand to Sit 3  Moderate assistance   Additional items Assist x 1; Increased time required;Verbal cues   Additional Comments Transfers with RW   Pt very forward flexed during transfer requiring increased time to achieve full stand   Ambulation/Elevation   Gait pattern Excessively slow; Short stride; Foward flexed;Decreased foot clearance   Gait Assistance 3  Moderate assist   Additional items Assist x 1;Verbal cues   Assistive Device Rolling walker   Distance 5ft advance/retreat x2 with seated rest in between   Ambulation/Elevation Additional Comments VCs for proximity to RW   Balance   Static Sitting Fair -   Dynamic Sitting Fair -   Static Standing Poor +   Dynamic Standing Poor   Ambulatory Poor   Endurance Deficit   Endurance Deficit Yes   Endurance Deficit Description weakness, fatigue, deconditioning   Activity Tolerance   Activity Tolerance Patient limited by fatigue;Patient limited by pain   Nurse Made Aware ok to see per RN   Exercises   Knee AROM Long Arc Quad Sitting;20 reps;AROM; Bilateral  (2x10)   Ankle Pumps Sitting;20 reps;AROM; Bilateral  (heel raise)   Assessment   Prognosis Good   Problem List Decreased strength;Decreased endurance; Impaired balance;Decreased mobility;Pain   Assessment Pt seen for PT session today with a focus on functional transfers, gait, endurance, and LE strengthening  Pt has made good progress since previous session requiring less assistance with all aspects of mobility  Pt was able to perform supine <> sit and STS transfers with Ax1  During STS transfers, pt demonstrates very forward flexed posture requiring increased time to achieve full stand  Once up, pt was able to ambulate short distances with RW  Pt with limited endurance, fatiguing quickly between bouts of activity requiring rest breaks to recover  Pt continues to demonstrate L lateral lean in sitting, resting on elevated HOB, but no lean observed in standing  Reviewed seated therex program with pt, all of which she was able to complete with good technique  Pt would benefit from continued acute skilled PT to address above deficits  Continuing to recommend rehab upon d/c     Goals   Patient Goals to have less back pain   STG Expiration Date 08/17/22   PT Treatment Day 1   Plan   Treatment/Interventions Functional transfer training;LE strengthening/ROM; Elevations; Therapeutic exercise; Endurance training;Bed mobility; Equipment eval/education;Gait training;Spoke to nursing;Spoke to case management;OT   Progress Progressing toward goals   PT Frequency Other (Comment)  (3-6x/wk)   Recommendation   PT Discharge Recommendation Post acute rehabilitation services   Equipment Recommended [facility] Recommended Wheeled walker   Change/add to Cursogram? No   AM-PAC Basic Mobility Inpatient   Turning in Bed Without Bedrails 2   Lying on Back to Sitting on Edge of Flat Bed 2   Moving Bed to Chair 2   Standing Up From Chair 2   Walk in Room 2   Climb 3-5 Stairs 1   Basic Mobility Inpatient Raw Score 11   Basic Mobility Standardized Score 30 25   Highest Level Of Mobility   JH-HLM Goal 4: Move to chair/commode   JH-HLM Achieved 5: Stand (1 or more minutes)   Education   Education Provided Home exercise program   Patient Demonstrates acceptance/verbal understanding   End of Consult   Patient Position at End of Consult Seated edge of bed; All needs within reach       Reinaldo Almaguer, PT, DPT

## 2022-08-08 NOTE — ASSESSMENT & PLAN NOTE
Results from last 7 days   Lab Units 08/08/22  0435 08/07/22  1856 08/07/22  1423 08/07/22  0311 08/06/22  1837 08/06/22  1220 08/06/22  1118   HEMOGLOBIN g/dL 8 4* 7 7* 8 2* 7 7* 8 5* 9 4*  --    I STAT HEMOGLOBIN g/dl  --   --   --   --   --   --  9 2*   HEMATOCRIT % 26 1* 24 8* 26 9* 25 1* 27 8* 31 1*  --    HEMATOCRIT, ISTAT %  --   --   --   --   --   --  27*     Most likely anemia of chronic disease due to prolonged illness  No active bleeding noted  Hg (8/4): 6 8 s/p  5 unit PRBC's since this admission  Hb stable, monitor closely  Consider transfusing if < 8 due to history of CAD  Continue Iron replacement as well as daily miralax

## 2022-08-08 NOTE — PROGRESS NOTES
Thoracic Surgery  Progress Note   Santa Levy 65 y o  female MRN: [id number]  Unit/Bed#: [facility] 429-01 Encounter: [id number]    Assessment:  Ms Dipak Blanco is a 64 yo female who is  s/p R VATS decortication with intraoperative chest tube placement x2  Was transfused 2 uPRBC intra-op  AF,VSS     Morning labs  WBC:   8 9 from 11  HGB:   8 4 from 7 7     Chest tube x2:  -20, negative AL, 155 cc serosanguineous output    Plan:  -keep chest tubes to -20 suction; monitor output in character   -diet per primary team   -trend hemoglobin   -DC A-line and Toscano once hemodynamically stable   -pain control per primary team   -continue to hold antiplatelet/anticoagulation therapy   -rest of care per primary team    Subjective/Objective     Subjective: Patient seen and examined at bedside, in no acute distress  Hgb was 7 7 on POD 1; responded to 8 2 after 1 unit  Pt became hypotensive to MAP of 60's with HR in the 50's; was evaluated by surgery and family medicine, CT output remained <100 since placement and was serosanguinous in character  Hgb was repeated and found to be 7 7 so he was transfused an additional 1 uPRBC as well as bloused 500cc and started on midodrine 5 with improvement of MAP to the 70's  Patient's pain is well controlled  She denies nausea or vomiting  Endorses passing gas  Objective:     Vitals:Blood pressure 100/61, pulse 57, temperature 98 5 °F (36 9 °C), resp  rate 15, weight 81 6 kg (179 lb 12 8 oz), SpO2 95 %  ,Body mass index is 26 55 kg/m²       Temp (24hrs), Av 6 °F (37 °C), Min:98 1 °F (36 7 °C), Max:99 °F (37 2 °C)  Current: Temperature: 98 5 °F (36 9 °C)      Intake/Output Summary (Last 24 hours) at 2022 0241  Last data filed at 2022 0037  Gross per 24 hour   Intake 4270 ml   Output 2585 ml   Net 1685 ml       Invasive Devices  Report    Peripheral Intravenous Line  Duration           Peripheral IV 22 Proximal;Right;Ventral (anterior) Forearm 3 days    Peripheral IV 08/06/22 Left Arm 1 day    Peripheral IV 08/06/22 Right Hand 1 day          Arterial Line  Duration           Arterial Line 08/06/22 Right Brachial 1 day          Drain  Duration           Chest Tube 1 Right Pleural 28 Fr  1 day    Chest Tube 2 Right Pleural 28 Fr  1 day    Urethral Catheter Latex 16 Fr  1 day          Airway  Duration           Surgical Airway Shiley Fenestrated 159 days                Physical Exam:  General: No acute distress, alert and oriented  CV: Well perfused, regular rate and rhythm  Chest: Chest tubes in place as above  Lungs: Normal work of breathing, no increased respiratory effort, tracheostomy in place on trach collar  Abdomen: Soft, non-tender, non-distended  Extremities: No edema, clubbing or cyanosis  Skin: Warm, dry    Lab Results: Results: I have personally reviewed all pertinent laboratory/tests results  VTE Prophylaxis: Sequential compression device (Arthea Buff)     Rod Walker MD  8/8/2022

## 2022-08-08 NOTE — PLAN OF CARE
Problem: OCCUPATIONAL THERAPY ADULT  Goal: Performs self-care activities at highest level of function for planned discharge setting  See evaluation for individualized goals  Description: Treatment Interventions: ADL retraining, Functional transfer training, UE strengthening/ROM, Endurance training, Patient/family training, Compensatory technique education, Continued evaluation, Energy conservation, Activityengagement          See flowsheet documentation for full assessment, interventions and recommendations  Note: Limitation: Decreased ADL status, Decreased endurance, Decreased self-care trans, Decreased high-level ADLs  Prognosis: Good  Assessment: Pt is a 65 y o  female admitted to [facility] on 8/2/2022 w/ right lung empyema s/p right lung decortication VATS on 8/6/2  Pt  has a past medical history of trach, Anxiety, Aortic aneurysm ([facility] [address] ), Arthritis, Depression, [facility] Fibromyalgia, GERD (gastroesophageal reflux disease), GERD (gastroesophageal reflux disease), H/O cardiovascular stress test (09/2018), H/O echocardiogram (01/2019), Hyperlipidemia, Hypertension, Migraines, Psychiatric disorder, and Uncontrolled hypertension (2/25/2015)  Pt with active OT orders and up and OOB as tolerated orders  Pt resides in a 2 story home with 3 FELTON  Pt lives with supportive family who are able to assist upon d/c  Pt reports that she was I w/  ADLS, required assist with IADLS, & required use of RW PTA  Currently pt is mod A for functional transfers and functional mobility, min A for UB ADLS and mod A for LB ADLS  Pt is limited at this time 2*: pain, endurance, activity tolerance, functional mobility, forward functional reach, functional standing tolerance and decreased I w/ ADLS/IADLS  The following Occupational Performance Areas to address include: grooming, bathing/shower, toilet hygiene, dressing, functional mobility and clothing management   Based on the aforementioned OT evaluation, functional performance deficits, and assessments, pt has been identified as a high complexity evaluation  From OT standpoint, anticipate d/c STR  Pt to continue to benefit from acute immediate OT services to address the following goals 3-5x/week to  w/in 10-14 days:        OT Discharge Recommendation: Post acute rehabilitation services

## 2022-08-08 NOTE — PROGRESS NOTES
[facility]  Progress Note - [place] 1965, 65 y o  female MRN: [id number]  Unit/Bed#: [facility] 429-01 Encounter: [id number]  Primary Care Provider: Francois Valderrama MD   Date and time admitted to hospital: 8/2/2022  8:31 PM    * Empyema lung, Right  Assessment & Plan  · Recurrent loculated right pleural effusion  S/P right-sided chest tube insertion and removal on 07/24  · CT of the chest on 7/28/22 revealed moderate partially loculated right pleural effusion  · Right chest tube re-placed on 7/29/22 by IR  · Pleural fluid analysis shows neutrophil predominant WBC count  · Fluid cultures from prior anterior chest tube site positive for MRSA  · 8/1 - CXR: Small component of right pleural effusion suspected which may be partially loculated  Indwelling right thoracostomy tube without pneumothorax  · 8/2 CT Chest:  Decreased size of a right-sided pleural effusion and improved aeration of the right lower lobe post placement of a right pleural drainage catheter, with unchanged appearance of a loculated component of the effusion superomedially  There is slightly  increased density of the effusion compatible with a small amount of blood products  2   Appearance of new inflammatory groundglass opacities in the left lower lobe  3   Mild background interstitial edema is unchanged  · 8/6 - S/P VATS Decortation  procedure  Plan:  - Continue Abx w/ Vanc given MRSA, Vanc will need to be continued through 8/16 per ID  - Maintain CT to suction and monitor output  - Aggressive pulmonary toilet  - Monitor fever curve and white count closely    CKD (chronic kidney disease) stage 3, GFR 30-59 ml/min ([facility])  Assessment & Plan  · MODESTO noted on initial admission to [facility] with elevated Cr 1 64, now resolved  · Baseline Cr being 0 9-1 0  · Continue to monitor closely   Losartan on hold  · Avoid nephrotoxic agents     Anemia  Assessment & Plan    Results from last 7 days   Lab Units 08/08/22  0435 08/07/22  1856 08/07/22  1423 08/07/22  0311 08/06/22  1837 08/06/22  1220 08/06/22  1118   HEMOGLOBIN g/dL 8 4* 7 7* 8 2* 7 7* 8 5* 9 4*  --    I STAT HEMOGLOBIN g/dl  --   --   --   --   --   --  9 2*   HEMATOCRIT % 26 1* 24 8* 26 9* 25 1* 27 8* 31 1*  --    HEMATOCRIT, ISTAT %  --   --   --   --   --   --  27*     Most likely anemia of chronic disease due to prolonged illness  No active bleeding noted  Hg (8/4): 6 8 s/p  5 unit PRBC's since this admission  Hb stable, monitor closely  Consider transfusing if < 8 due to history of CAD  Continue Iron replacement as well as daily miralax     Tracheostomy in place [facility])  Assessment & Plan  · Trach placed in the context of cardiac arrest/prolonged ventilator dependent state November 2021  · Decannulated at [facility] 12/11/21  · Patient requires frequent tracheostomy changes and suctioning  · Per discussion with speech therapy,  video barium swallow was done for sense of food getting stuck  Appropriate for regular diet  · On trach collar O2 with humidification and tolerating well btw 6-8L     CAD (coronary artery disease)  Assessment & Plan  · STEMI 10/22/2021 s/p PATRICA mid circumflex OM1  OM2 was ballooned but not stented    · Pulseless VT 10/27/21 - repeat LHC 90% mid circumflex stenosis, but could not be intervened on  · VT 10/28/21 LHC:  found to have apical LAD complete occlusion was felt to be small to be intervened    · Cardiac carrest 1/1/22 - [facility] felt to be hypoxic vs  VT induced  · On metoprolol 50mg BID, Brilinta 90 mg BID and Lipitor 40 mg qd     A-fib [facility])  Assessment & Plan  Follows closely with Cardiology  Rhythm controlled with amiodarone 100 mg daily and AC with Eliquis 5 mg BID  Eliquis on Hold per cardiology due to procedure (VATS)    Type 2 diabetes mellitus with hyperglycemia [facility])  Assessment & Plan  Lab Results   Component Value Date    HGBA1C 12 7 (H) 05/22/2022       Recent Labs     08/07/22  0069 08/07/22  1859 08/07/22 2048 08/08/22  0014   POCGLU 95 77 79 109     Uncontrolled per most recent A1c  Home regimen: Lantus 16U QHS and Novolog 4U TID  Current inpatient regimen: Lantus 30U qHS + Humalog 20U TID + SSI 4  Will continue for now and adjust as needed to maintain  - 180      Hypertension  Assessment & Plan  BP stable  On home losartan 50 mg BID, follows closely with Cardiology  Losartan on hold but can consider restarting as MODESTO has now resolved  Monitor BP closely     Hyperlipidemia associated with type 2 diabetes mellitus (HCC)  Assessment & Plan  - Continue Atorvastatin 40 mg daily  Diabetic peripheral neuropathy (HCC)  Assessment & Plan  Continue PTA Lyrica as documented     Anxiety  Assessment & Plan  On Lexapro 10 mg daily as well as Atarax prn             Subjective/Objective     Subjective:   Patient seen and examined at bedside  She has significant events overnight  She was noted to be hypotensive with MAP's <65, which appropriately improved with bolus of fluids, Midodrine, and albumin  She continues to complain of pain around chest tube site  Her appetite is low  But otherwise denies any further acute complains or concerns  Objective:  Vitals: Blood pressure 116/67, pulse 62, temperature 98 5 °F (36 9 °C), temperature source Oral, resp  rate 18, weight 80 9 kg (178 lb 5 6 oz), SpO2 95 %  ,Body mass index is 26 34 kg/m²        Intake/Output Summary (Last 24 hours) at 8/8/2022 1306  Last data filed at 8/8/2022 8100  Gross per 24 hour   Intake 2490 ml   Output 2725 ml   Net -235 ml       Invasive Devices  Report    Peripheral Intravenous Line  Duration           Peripheral IV 08/04/22 Proximal;Right;Ventral (anterior) Forearm 4 days    Peripheral IV 08/06/22 Left Arm 2 days    Peripheral IV 08/06/22 Right Hand 2 days          Arterial Line  Duration           Arterial Line 08/06/22 Right Brachial 2 days          Drain  Duration           Chest Tube 1 Right Pleural 28 Fr  2 days Chest Tube 2 Right Pleural 28 Fr  2 days    Urethral Catheter Latex 16 Fr  2 days          Airway  Duration           Surgical Airway Shiley Fenestrated 159 days                  Physical Exam  Vitals and nursing note reviewed  Constitutional:       General: She is not in acute distress  Appearance: She is well-developed  She is ill-appearing  She is not toxic-appearing or diaphoretic  HENT:      Head: Normocephalic and atraumatic  Cardiovascular:      Rate and Rhythm: Normal rate and regular rhythm  Heart sounds: No murmur heard  Comments: Right sided chest tubes in place  Area C/D/I  Pulmonary:      Effort: Pulmonary effort is normal  No respiratory distress  Breath sounds: Normal breath sounds  Comments: Trach collar in place  Abdominal:      General: Bowel sounds are normal  There is no distension  Palpations: Abdomen is soft  Tenderness: There is no abdominal tenderness  Musculoskeletal:      Cervical back: Neck supple  Right lower leg: No edema  Skin:     General: Skin is warm and dry  Neurological:      Mental Status: She is alert  Mental status is at baseline  Lab, Imaging and other studies: I have personally reviewed pertinent reports  Results from last 7 days   Lab Units 08/08/22  0435 08/07/22  1856 08/07/22  1423 08/07/22  0311 08/06/22  0951 08/06/22  0415   WBC Thousand/uL 8 89 11 08*  --  9 57   < > 7 44   HEMOGLOBIN g/dL 8 4* 7 7* 8 2* 7 7*   < > 9 2*   I STAT HEMOGLOBIN   --   --   --   --    < >  --    HEMATOCRIT % 26 1* 24 8* 26 9* 25 1*   < > 31 0*   HEMATOCRIT, ISTAT   --   --   --   --    < >  --    PLATELETS Thousands/uL 373 387  --  423*   < > 573*   NEUTROS PCT % 71 78*  --   --   --  68   MONOS PCT % 11 11  --   --   --  11    < > = values in this interval not displayed       Results from last 7 days   Lab Units 08/08/22  0435 08/07/22  1856 08/07/22  0311 08/06/22  1220 08/06/22  1118 08/06/22  1058 08/06/22  0951 08/04/22  0454 08/03/22  0559 08/02/22  0634   POTASSIUM mmol/L 4 4 4 4 5 0   < >  --   --   --    < > 3 7 3 8   CHLORIDE mmol/L 105 102 104   < >  --   --   --    < > 101 98   CO2 mmol/L 29 28 27   < >  --   --   --    < > 26 27   CO2, I-STAT mmol/L  --   --   --   --  26 25 30   < >  --   --    BUN mg/dL 41* 43* 42*   < >  --   --   --    < > 39* 35*   CREATININE mg/dL 1 34* 1 39* 1 32*   < >  --   --   --    < > 1 34* 1 14   CALCIUM mg/dL 8 1* 7 7* 8 0*   < >  --   --   --    < > 8 9 8 5   ALK PHOS U/L  --  56  --   --   --   --   --   --  81 75   ALT U/L  --  11*  --   --   --   --   --   --  18 7   AST U/L  --  19  --   --   --   --   --   --  33 16   GLUCOSE, ISTAT mg/dl  --   --   --   --  220* 201* 224*  --   --   --     < > = values in this interval not displayed  Results from last 7 days   Lab Units 08/07/22  0311 08/06/22  1220   MAGNESIUM mg/dL 2 1 1 6     Lab Results   Component Value Date    PHOS 4 6 (H) 07/18/2022    PHOS 3 9 07/17/2022    PHOS 4 2 07/16/2022      Results from last 7 days   Lab Units 08/06/22  0415 08/05/22 2029 08/05/22  1039 08/05/22  0147 08/04/22  1442   INR   --   --   --   --  1 28*   PTT seconds 37 82* 43*   < > 33    < > = values in this interval not displayed       Results from last 7 days   Lab Units 08/07/22  1856   LACTIC ACID mmol/L 0 7     0   Lab Value Date/Time    TROPONINI >40 00 (H) 10/24/2021 0839    TROPONINI >40 00 (H) 10/22/2021 1851    TROPONINI >40 00 (H) 10/22/2021 1442    TROPONINI <0 03 08/26/2021 0802    TROPONINI <0 03 04/18/2021 1413    TROPONINI <0 02 11/17/2020 1537    TROPONINI <0 03 07/26/2020 1821    TROPONINI <0 02 06/02/2020 2250    TROPONINI <0 02 06/02/2020 1952    TROPONINI <0 02 03/04/2019 2122    TROPONINI <0 02 10/07/2018 1516    TROPONINI <0 02 08/12/2018 0042    TROPONINI <0 02 08/11/2018 2058    TROPONINI <0 02 08/11/2018 1543    TROPONINI <0 02 08/10/2018 1912     ABG:  Lab Results   Component Value Date    PHART 7 378 08/07/2022    EFE6FDV 46 0 (H) 08/07/2022    PO2ART 70 3 (L) 08/07/2022    TIQ7NAC 26 5 08/07/2022    BEART 1 1 08/07/2022    SOURCE Line, Arterial 08/07/2022     VTE Pharmacologic Prophylaxis: Heparin     VTE Mechanical Prophylaxis: sequential compression device

## 2022-08-08 NOTE — ASSESSMENT & PLAN NOTE
· Recurrent loculated right pleural effusion  S/P right-sided chest tube insertion and removal on 07/24  · CT of the chest on 7/28/22 revealed moderate partially loculated right pleural effusion  · Right chest tube re-placed on 7/29/22 by IR  · Pleural fluid analysis shows neutrophil predominant WBC count  · Fluid cultures from prior anterior chest tube site positive for MRSA  · 8/1 - CXR: Small component of right pleural effusion suspected which may be partially loculated  Indwelling right thoracostomy tube without pneumothorax  · 8/2 CT Chest:  Decreased size of a right-sided pleural effusion and improved aeration of the right lower lobe post placement of a right pleural drainage catheter, with unchanged appearance of a loculated component of the effusion superomedially  There is slightly  increased density of the effusion compatible with a small amount of blood products  2   Appearance of new inflammatory groundglass opacities in the left lower lobe  3   Mild background interstitial edema is unchanged  · 8/6 - S/P VATS Decortation  procedure     Plan:  - Continue Abx w/ Vanc given MRSA, Vanc will need to be continued through 8/16 per ID  - Maintain CT to suction and monitor output  - Aggressive pulmonary toilet  - Monitor fever curve and white count closely

## 2022-08-08 NOTE — ASSESSMENT & PLAN NOTE
· STEMI 10/22/2021 s/p PATRICA mid circumflex OM1  OM2 was ballooned but not stented    · Pulseless VT 10/27/21 - repeat LHC 90% mid circumflex stenosis, but could not be intervened on  · VT 10/28/21 LHC:  found to have apical LAD complete occlusion was felt to be small to be intervened    · Cardiac carrest 1/1/22 - [facility] felt to be hypoxic vs  VT induced  · On metoprolol 50mg BID, Brilinta 90 mg BID and Lipitor 40 mg qd

## 2022-08-08 NOTE — PROGRESS NOTES
Vancomycin IV Pharmacy-to-Dose Consultation    Alisha Russell is a 65 y o  female who is currently receiving Vancomycin IV with management by the Pharmacy Consult service for the treatment of other empyema  Assessment/Plan:    The patient's chart was reviewed  Renal function is stable  There are no signs or symptoms of nephrotoxicity and/or infusion reactions documented  The following nephrotoxicity factors are present:  Patient-Factors: renal dysfunction    Based on today's assessment, will continue current vancomycin (Day # 8) dosing of 1250 mg q24h, with a plan for trough to be drawn at 1330 on 8/10  We will continue to follow the patient's culture results and clinical progress daily        Johan Wilson, Pharmacist

## 2022-08-09 ENCOUNTER — APPOINTMENT (INPATIENT)
Dept: RADIOLOGY | Facility: HOSPITAL | Age: 57
DRG: 853 | End: 2022-08-09
Payer: COMMERCIAL

## 2022-08-09 LAB
ANION GAP SERPL CALCULATED.3IONS-SCNC: 2 MMOL/L (ref 4–13)
BACTERIA SPEC ANAEROBE CULT: NO GROWTH
BACTERIA SPEC BFLD CULT: NO GROWTH
BASOPHILS # BLD AUTO: 0.01 THOUSANDS/ΜL (ref 0–0.1)
BASOPHILS NFR BLD AUTO: 0 % (ref 0–1)
BUN SERPL-MCNC: 32 MG/DL (ref 5–25)
CALCIUM SERPL-MCNC: 8.4 MG/DL (ref 8.3–10.1)
CHLORIDE SERPL-SCNC: 103 MMOL/L (ref 96–108)
CO2 SERPL-SCNC: 29 MMOL/L (ref 21–32)
CREAT SERPL-MCNC: 1.32 MG/DL (ref 0.6–1.3)
EOSINOPHIL # BLD AUTO: 0.04 THOUSAND/ΜL (ref 0–0.61)
EOSINOPHIL NFR BLD AUTO: 0 % (ref 0–6)
ERYTHROCYTE [DISTWIDTH] IN BLOOD BY AUTOMATED COUNT: 18.6 % (ref 11.6–15.1)
GFR SERPL CREATININE-BSD FRML MDRD: 45 ML/MIN/1.73SQ M
GLUCOSE SERPL-MCNC: 126 MG/DL (ref 65–140)
GLUCOSE SERPL-MCNC: 141 MG/DL (ref 65–140)
GLUCOSE SERPL-MCNC: 154 MG/DL (ref 65–140)
GLUCOSE SERPL-MCNC: 59 MG/DL (ref 65–140)
GLUCOSE SERPL-MCNC: 82 MG/DL (ref 65–140)
GLUCOSE SERPL-MCNC: 97 MG/DL (ref 65–140)
GRAM STN SPEC: NORMAL
GRAM STN SPEC: NORMAL
HCT VFR BLD AUTO: 29 % (ref 34.8–46.1)
HGB BLD-MCNC: 9.1 G/DL (ref 11.5–15.4)
IMM GRANULOCYTES # BLD AUTO: 0.05 THOUSAND/UL (ref 0–0.2)
IMM GRANULOCYTES NFR BLD AUTO: 1 % (ref 0–2)
LYMPHOCYTES # BLD AUTO: 1.23 THOUSANDS/ΜL (ref 0.6–4.47)
LYMPHOCYTES NFR BLD AUTO: 14 % (ref 14–44)
MCH RBC QN AUTO: 25.9 PG (ref 26.8–34.3)
MCHC RBC AUTO-ENTMCNC: 31.4 G/DL (ref 31.4–37.4)
MCV RBC AUTO: 82 FL (ref 82–98)
MONOCYTES # BLD AUTO: 0.8 THOUSAND/ΜL (ref 0.17–1.22)
MONOCYTES NFR BLD AUTO: 9 % (ref 4–12)
NEUTROPHILS # BLD AUTO: 6.79 THOUSANDS/ΜL (ref 1.85–7.62)
NEUTS SEG NFR BLD AUTO: 76 % (ref 43–75)
NRBC BLD AUTO-RTO: 0 /100 WBCS
PLATELET # BLD AUTO: 446 THOUSANDS/UL (ref 149–390)
PMV BLD AUTO: 9.7 FL (ref 8.9–12.7)
POTASSIUM SERPL-SCNC: 4.8 MMOL/L (ref 3.5–5.3)
RBC # BLD AUTO: 3.52 MILLION/UL (ref 3.81–5.12)
SODIUM SERPL-SCNC: 134 MMOL/L (ref 135–147)
WBC # BLD AUTO: 8.92 THOUSAND/UL (ref 4.31–10.16)

## 2022-08-09 PROCEDURE — 99231 SBSQ HOSP IP/OBS SF/LOW 25: CPT | Performed by: FAMILY MEDICINE

## 2022-08-09 PROCEDURE — 99232 SBSQ HOSP IP/OBS MODERATE 35: CPT | Performed by: INTERNAL MEDICINE

## 2022-08-09 PROCEDURE — 80048 BASIC METABOLIC PNL TOTAL CA: CPT | Performed by: FAMILY MEDICINE

## 2022-08-09 PROCEDURE — 99024 POSTOP FOLLOW-UP VISIT: CPT | Performed by: THORACIC SURGERY (CARDIOTHORACIC VASCULAR SURGERY)

## 2022-08-09 PROCEDURE — 85025 COMPLETE CBC W/AUTO DIFF WBC: CPT | Performed by: FAMILY MEDICINE

## 2022-08-09 PROCEDURE — NC001 PR NO CHARGE

## 2022-08-09 PROCEDURE — 71046 X-RAY EXAM CHEST 2 VIEWS: CPT

## 2022-08-09 PROCEDURE — 82948 REAGENT STRIP/BLOOD GLUCOSE: CPT

## 2022-08-09 RX ORDER — INSULIN LISPRO 100 [IU]/ML
2-12 INJECTION, SOLUTION INTRAVENOUS; SUBCUTANEOUS
Status: DISCONTINUED | OUTPATIENT
Start: 2022-08-10 | End: 2022-08-09

## 2022-08-09 RX ORDER — INSULIN LISPRO 100 [IU]/ML
10 INJECTION, SOLUTION INTRAVENOUS; SUBCUTANEOUS
Status: DISCONTINUED | OUTPATIENT
Start: 2022-08-10 | End: 2022-08-13

## 2022-08-09 RX ORDER — ATORVASTATIN CALCIUM 40 MG/1
TABLET, FILM COATED ORAL
Qty: 90 TABLET | Refills: 0 | Status: SHIPPED | OUTPATIENT
Start: 2022-08-09 | End: 2022-08-18

## 2022-08-09 RX ORDER — INSULIN LISPRO 100 [IU]/ML
2-12 INJECTION, SOLUTION INTRAVENOUS; SUBCUTANEOUS
Status: DISCONTINUED | OUTPATIENT
Start: 2022-08-09 | End: 2022-08-15

## 2022-08-09 RX ORDER — INSULIN LISPRO 100 [IU]/ML
2-12 INJECTION, SOLUTION INTRAVENOUS; SUBCUTANEOUS
Status: DISCONTINUED | OUTPATIENT
Start: 2022-08-09 | End: 2022-08-09

## 2022-08-09 RX ADMIN — HYDROMORPHONE HYDROCHLORIDE 0.5 MG: 1 INJECTION, SOLUTION INTRAMUSCULAR; INTRAVENOUS; SUBCUTANEOUS at 13:13

## 2022-08-09 RX ADMIN — ATORVASTATIN CALCIUM 40 MG: 40 TABLET, FILM COATED ORAL at 15:33

## 2022-08-09 RX ADMIN — TICAGRELOR 90 MG: 90 TABLET ORAL at 12:45

## 2022-08-09 RX ADMIN — TICAGRELOR 90 MG: 90 TABLET ORAL at 08:32

## 2022-08-09 RX ADMIN — INSULIN LISPRO 20 UNITS: 100 INJECTION, SOLUTION INTRAVENOUS; SUBCUTANEOUS at 08:37

## 2022-08-09 RX ADMIN — INSULIN LISPRO 20 UNITS: 100 INJECTION, SOLUTION INTRAVENOUS; SUBCUTANEOUS at 12:44

## 2022-08-09 RX ADMIN — AMIODARONE HYDROCHLORIDE 100 MG: 200 TABLET ORAL at 08:33

## 2022-08-09 RX ADMIN — PREGABALIN 25 MG: 25 CAPSULE ORAL at 18:18

## 2022-08-09 RX ADMIN — LIDOCAINE 5% 2 PATCH: 700 PATCH TOPICAL at 08:31

## 2022-08-09 RX ADMIN — HYDROMORPHONE HYDROCHLORIDE 4 MG: 2 TABLET ORAL at 04:29

## 2022-08-09 RX ADMIN — APIXABAN 5 MG: 5 TABLET, FILM COATED ORAL at 08:36

## 2022-08-09 RX ADMIN — VANCOMYCIN HYDROCHLORIDE 1250 MG: 10 INJECTION, POWDER, LYOPHILIZED, FOR SOLUTION INTRAVENOUS at 14:01

## 2022-08-09 RX ADMIN — BUMETANIDE 2 MG: 2 TABLET ORAL at 10:20

## 2022-08-09 RX ADMIN — SPIRONOLACTONE 100 MG: 50 TABLET ORAL at 08:32

## 2022-08-09 RX ADMIN — ESCITALOPRAM OXALATE 10 MG: 10 TABLET ORAL at 08:36

## 2022-08-09 RX ADMIN — APIXABAN 5 MG: 5 TABLET, FILM COATED ORAL at 18:18

## 2022-08-09 RX ADMIN — METOPROLOL SUCCINATE 50 MG: 50 TABLET, EXTENDED RELEASE ORAL at 05:19

## 2022-08-09 RX ADMIN — TICAGRELOR 90 MG: 90 TABLET ORAL at 21:27

## 2022-08-09 RX ADMIN — METOPROLOL SUCCINATE 50 MG: 50 TABLET, EXTENDED RELEASE ORAL at 18:18

## 2022-08-09 RX ADMIN — PANTOPRAZOLE SODIUM 40 MG: 40 TABLET, DELAYED RELEASE ORAL at 05:19

## 2022-08-09 RX ADMIN — PREGABALIN 75 MG: 75 CAPSULE ORAL at 08:32

## 2022-08-09 NOTE — PROGRESS NOTES
Thoracic Surgery  Progress Note   Giacomo Kingston 65 y o  female MRN: [id number]  Unit/Bed#: [facility] 429-01 Encounter: [id number]    Assessment:  64 yo female w/ R empyema s/p R vats decort on     AVSS, 8L trach mask  CTx2, sxn, 180 cc SS, - AL    OR cx: no growth     Plan:  -plan for transition of chest tube to WS this AM will get CXR after transition  -Clear of AC/anti-platelet per primary  -Continue multimodal pain control  -pulmonary toliet  -Remainder of care per primary      Subjective/Objective     Subjective:   No acute events overnight  She states she has pain in her R chest from chest tubes  Objective:     Vitals:Blood pressure 136/82, pulse 70, temperature 98 6 °F (37 °C), temperature source Oral, resp  rate 22, weight 80 9 kg (178 lb 5 6 oz), SpO2 98 %  ,Body mass index is 26 34 kg/m²  Temp (24hrs), Av 6 °F (37 °C), Min:98 4 °F (36 9 °C), Max:98 9 °F (37 2 °C)  Current: Temperature: 98 6 °F (37 °C)      Intake/Output Summary (Last 24 hours) at 2022 0545  Last data filed at 2022 0401  Gross per 24 hour   Intake --   Output 4770 ml   Net -4770 ml       Invasive Devices  Report    Peripheral Intravenous Line  Duration           Peripheral IV 22 Left Arm 2 days    Peripheral IV 22 Right Hand 2 days          Drain  Duration           Chest Tube 1 Right Pleural 28 Fr  2 days    Chest Tube 2 Right Pleural 28 Fr  2 days    Urethral Catheter Latex 16 Fr  2 days          Airway  Duration           Surgical Airway Shiley Fenestrated 160 days                Physical Exam:  General: NAD  HENT: NCAT MMM  Neck:Trach in place on 8L trach mask  CV: nl rate  Lungs: R ctx2 to sxn, -AL, SS output  Incision c/d/i    ABD: Soft, nontender, nondistended  Extrem: No CCE  Neuro: AAOx3      Lab Results: Results: I have personally reviewed all pertinent laboratory/tests results  VTE Prophylaxis: Sequential compression device Gilson Gave)     Malia Hall,   2022

## 2022-08-09 NOTE — PROGRESS NOTES
[facility]  Progress Note - Matias Cortez 1965, 65 y o  female MRN: [id number]  Unit/Bed#: [facility] 429-01 Encounter: [id number]  Primary Care Provider: Aundrea Mcclain MD   Date and time admitted to hospital: 8/2/2022  8:31 PM    * Empyema lung, Right  Assessment & Plan  · Recurrent loculated right pleural effusion  S/P right-sided chest tube insertion and removal on 07/24  · CT of the chest on 7/28/22 revealed moderate partially loculated right pleural effusion  · Right chest tube re-placed on 7/29/22 by IR  · Pleural fluid analysis shows neutrophil predominant WBC count  · Fluid cultures from prior anterior chest tube site positive for MRSA  · 8/1 - CXR: Small component of right pleural effusion suspected which may be partially loculated  Indwelling right thoracostomy tube without pneumothorax  · 8/2 CT Chest:  Decreased size of a right-sided pleural effusion and improved aeration of the right lower lobe post placement of a right pleural drainage catheter, with unchanged appearance of a loculated component of the effusion superomedially  There is slightly  increased density of the effusion compatible with a small amount of blood products  2   Appearance of new inflammatory groundglass opacities in the left lower lobe  3   Mild background interstitial edema is unchanged  · 8/6 - S/P VATS Decortation  procedure     Plan:  - Continue Abx w/ Vanc given MRSA, Vanc will need to be continued through 8/16 per ID  - Maintain CT to suction and monitor output  - Aggressive pulmonary toilet  - Monitor fever curve and white count closely    Acute on chronic heart failure with preserved ejection fraction (HCC)  Assessment & Plan  Wt Readings from Last 3 Encounters:   08/09/22 81 kg (178 lb 9 2 oz)   08/02/22 77 7 kg (171 lb 6 4 oz)   07/19/22 78 2 kg (172 lb 6 4 oz)     · Initially admitted to [facility] on 07/15 with CHF exacerbation, now optimized  · Most recent EF 50% with normal systolic and diastolic function on 6/59/3549  Currently stable and euvolemic  · On Bumex 2 mg daily with Aldactone 100 mg daily - restarted on 7/29  · Continue low-salt diet, strict I's and O's monitoring  · Per Cardiology at [facility] 1153 upon discharge  · Will need follow-up with General Cardiology & EP service upon discharge for ICD consideration    Chest wall wound infection  Assessment & Plan  · Purulent discharge noted at site of recent chest tube placement  · Cultures positive for MRSA on 07/28 susceptible to vanc  · Currently on IV vancomycin - will continue  · Continue local wound care/dressing changes     Pain at Chest tube insertion site  Assessment & Plan  · Continues with intractable pain at prior chest tube site on right anterior chest wall  This was removed 7/24/22  · Was followed by APS back in [place], S/P epidural catheter which was removed 8/2  · Current pain regimen: Diaz Tylenol 975 Q8h, PO Dilaudid 2/4 for mod/severe, IV Dilaudid 0 5 mg Q2  · Bowel regimen:  Scheduled senna and MiraLax daily  · Plan to discontinue IV Dilaudid when Chest tube removed  Acute Respiratory insufficiency on chronic hypoxic respiratory failure  Assessment & Plan  · She is status post trach  Currently on 6-8L with sats in the high 90s, at home uses 10 L  · Status post recent right pneumothorax requiring chest tube and now with right empyema  · Continue aggressive respiratory protocol, p r n  suctioning  · Continue Xoponex and Atrovent per Pulm q6hrs  · Encourage out of bed, incentive spirometry  · Pulmonology following      CKD (chronic kidney disease) stage 3, GFR 30-59 ml/min ([facility])  Assessment & Plan  · MODESTO noted on initial admission to [facility] with elevated Cr 1 64, now resolved  · Baseline Cr being 0 9-1 0  · Continue to monitor closely   Losartan on hold  · Avoid nephrotoxic agents     Anemia  Assessment & Plan    Results from last 7 days   Lab Units 08/09/22  0840 08/08/22  2955 08/07/22  1856 08/07/22  1423 08/07/22  0311 08/06/22  1837 08/06/22  1220   HEMOGLOBIN g/dL 9 1* 8 4* 7 7* 8 2* 7 7* 8 5* 9 4*   HEMATOCRIT % 29 0* 26 1* 24 8* 26 9* 25 1* 27 8* 31 1*     Most likely anemia of chronic disease due to prolonged illness  No active bleeding noted  Hg (8/4): 6 8 s/p  5 unit PRBC's since this admission  Hb stable, monitor closely  Consider transfusing if < 8 due to history of CAD  Continue Iron replacement as well as daily miralax     Tracheostomy in place [facility])  Assessment & Plan  · Trach placed in the context of cardiac arrest/prolonged ventilator dependent state November 2021  · Decannulated at [facility] 12/11/21  · Patient requires frequent tracheostomy changes and suctioning  · Per discussion with speech therapy,  video barium swallow was done for sense of food getting stuck  Appropriate for regular diet  · On trach collar O2 with humidification and tolerating well btw 6-8L     CAD (coronary artery disease)  Assessment & Plan  · STEMI 10/22/2021 s/p PATRICA mid circumflex OM1  OM2 was ballooned but not stented    · Pulseless VT 10/27/21 - repeat LHC 90% mid circumflex stenosis, but could not be intervened on  · VT 10/28/21 LHC:  found to have apical LAD complete occlusion was felt to be small to be intervened    · Cardiac carrest 1/1/22 - [facility] felt to be hypoxic vs  VT induced  · On metoprolol 50mg BID, Brilinta 90 mg BID and Lipitor 40 mg qd     A-fib [facility])  Assessment & Plan  Follows closely with Cardiology  Rhythm controlled with amiodarone 100 mg daily and AC with Eliquis 5 mg BID      Type 2 diabetes mellitus with hyperglycemia [facility])  Assessment & Plan  Lab Results   Component Value Date    HGBA1C 12 7 (H) 05/22/2022       Recent Labs     08/08/22  1021 08/08/22  1620 08/08/22  2122 08/09/22  0536   POCGLU 229* 169* 82 126     Uncontrolled per most recent A1c  Home regimen: Lantus 16U QHS and Novolog 4U TID  Current inpatient regimen: Lantus 30U qHS + Humalog 20U TID + SSI 4  Will continue for now and adjust as needed to maintain  - 180      Hypertension  Assessment & Plan  BP stable  On home losartan 50 mg BID, follows closely with Cardiology  Losartan on hold but can consider restarting as MODESTO has now resolved  Monitor BP closely     Hyperlipidemia associated with type 2 diabetes mellitus (HCC)  Assessment & Plan  - Continue Atorvastatin 40 mg daily  Diabetic peripheral neuropathy (HCC)  Assessment & Plan  Continue PTA Lyrica as documented     Anxiety  Assessment & Plan  On Lexapro 10 mg daily as well as Atarax prn           Subjective/Objective     Subjective:   Patient seen and examined at bedside  No overnight events  Patient states she didn't sleep well  Was having troubles breathing on her right side  Still has poor PO intake  Denies chest pain, palpitation, headache  She had a bowel movement this AM      Objective:  Vitals: Blood pressure 133/75, pulse 62, temperature 99 °F (37 2 °C), resp  rate 18, weight 81 kg (178 lb 9 2 oz), SpO2 95 %  ,Body mass index is 26 37 kg/m²  Intake/Output Summary (Last 24 hours) at 8/9/2022 1227  Last data filed at 8/9/2022 1001  Gross per 24 hour   Intake 660 ml   Output 5145 ml   Net -4485 ml       Invasive Devices  Report    Peripheral Intravenous Line  Duration           Peripheral IV 08/06/22 Left Arm 3 days    Peripheral IV 08/06/22 Right Hand 3 days          Drain  Duration           Chest Tube 1 Right Pleural 28 Fr  3 days    Chest Tube 2 Right Pleural 28 Fr  3 days    Urethral Catheter Latex 16 Fr  3 days          Airway  Duration           Surgical Airway Shiley Fenestrated 160 days              Physical Exam  Vitals and nursing note reviewed  Constitutional:       General: She is not in acute distress  Appearance: She is well-developed  She is ill-appearing  She is not toxic-appearing or diaphoretic  HENT:      Head: Normocephalic and atraumatic     Cardiovascular:      Rate and Rhythm: Normal rate and regular rhythm  Heart sounds: No murmur heard  Comments: Right sided chest tubes in place  Area C/D/I  Pulmonary:      Effort: Pulmonary effort is normal  No respiratory distress  Breath sounds: Normal breath sounds  Comments: Trach collar in place  Abdominal:      General: Bowel sounds are normal  There is no distension  Palpations: Abdomen is soft  Tenderness: There is no abdominal tenderness  Musculoskeletal:      Cervical back: Neck supple  Right lower leg: No edema  Skin:     General: Skin is warm and dry  Neurological:      Mental Status: She is alert  Mental status is at baseline            Lab, Imaging and other studies: I have personally reviewed pertinent reports  Results from last 7 days   Lab Units 08/09/22  0840 08/08/22  0435 08/07/22  1856   WBC Thousand/uL 8 92 8 89 11 08*   HEMOGLOBIN g/dL 9 1* 8 4* 7 7*   HEMATOCRIT % 29 0* 26 1* 24 8*   PLATELETS Thousands/uL 446* 373 387   NEUTROS PCT % 76* 71 78*   MONOS PCT % 9 11 11     Results from last 7 days   Lab Units 08/09/22  0840 08/08/22  0435 08/07/22  1856 08/06/22  1220 08/06/22  1118 08/06/22  1058 08/06/22  0951 08/04/22  0454 08/03/22  0559   POTASSIUM mmol/L 4 8 4 4 4 4   < >  --   --   --    < > 3 7   CHLORIDE mmol/L 103 105 102   < >  --   --   --    < > 101   CO2 mmol/L 29 29 28   < >  --   --   --    < > 26   CO2, I-STAT mmol/L  --   --   --   --  26 25 30   < >  --    BUN mg/dL 32* 41* 43*   < >  --   --   --    < > 39*   CREATININE mg/dL 1 32* 1 34* 1 39*   < >  --   --   --    < > 1 34*   CALCIUM mg/dL 8 4 8 1* 7 7*   < >  --   --   --    < > 8 9   ALK PHOS U/L  --   --  56  --   --   --   --   --  81   ALT U/L  --   --  11*  --   --   --   --   --  18   AST U/L  --   --  19  --   --   --   --   --  33   GLUCOSE, ISTAT mg/dl  --   --   --   --  220* 201* 224*  --   --     < > = values in this interval not displayed       Results from last 7 days   Lab Units 08/07/22  0311 08/06/22  1225 MAGNESIUM mg/dL 2 1 1 6     Lab Results   Component Value Date    PHOS 4 6 (H) 07/18/2022    PHOS 3 9 07/17/2022    PHOS 4 2 07/16/2022      Results from last 7 days   Lab Units 08/06/22  0415 08/05/22 2029 08/05/22  1039 08/05/22  0147 08/04/22  1442   INR   --   --   --   --  1 28*   PTT seconds 37 82* 43*   < > 33    < > = values in this interval not displayed       Results from last 7 days   Lab Units 08/07/22  1856   LACTIC ACID mmol/L 0 7     0   Lab Value Date/Time    TROPONINI >40 00 (H) 10/24/2021 0839    TROPONINI >40 00 (H) 10/22/2021 1851    TROPONINI >40 00 (H) 10/22/2021 1442    TROPONINI <0 03 08/26/2021 0802    TROPONINI <0 03 04/18/2021 1413    TROPONINI <0 02 11/17/2020 1537    TROPONINI <0 03 07/26/2020 1821    TROPONINI <0 02 06/02/2020 2250    TROPONINI <0 02 06/02/2020 1952    TROPONINI <0 02 03/04/2019 2122    TROPONINI <0 02 10/07/2018 1516    TROPONINI <0 02 08/12/2018 0042    TROPONINI <0 02 08/11/2018 2058    TROPONINI <0 02 08/11/2018 1543    TROPONINI <0 02 08/10/2018 1912     ABG:  Lab Results   Component Value Date    PHART 7 378 08/07/2022    MVP8WBG 46 0 (H) 08/07/2022    PO2ART 70 3 (L) 08/07/2022    NNO3QQO 26 5 08/07/2022    BEART 1 1 08/07/2022    SOURCE Line, Arterial 08/07/2022     VTE Pharmacologic Prophylaxis: Reason for no pharmacologic prophylaxis Eliquis  VTE Mechanical Prophylaxis: sequential compression device

## 2022-08-09 NOTE — ARC ADMISSION
Reviewed patient's case with [facility] physician - will continue to follow at this time, monitoring for medical stability and functional progress  Will request for PM&R follow-up later this week, and will update as able

## 2022-08-09 NOTE — PROGRESS NOTES
Vancomycin IV Pharmacy-to-Dose Consultation    Amanda Lee is a 65 y o  female who is currently receiving Vancomycin IV with management by the Pharmacy Consult service  Assessment/Plan:  The patient was reviewed  Renal function is stable and no signs or symptoms of nephrotoxicity and/or infusion reactions were documented in the chart  Based on todays assessment, continue current vancomycin (day # 9) dosing of 1250mg q 24h, with a plan for trough to be drawn at 1330 on 8/10  We will continue to follow the patients culture results and clinical progress daily      Khanh Altamirano, Pharmacist

## 2022-08-09 NOTE — ASSESSMENT & PLAN NOTE
Lab Results   Component Value Date    HGBA1C 12 7 (H) 05/22/2022       Recent Labs     08/08/22  1021 08/08/22  1620 08/08/22  2122 08/09/22  0536   POCGLU 229* 169* 82 126     Uncontrolled per most recent A1c  Home regimen: Lantus 16U QHS and Novolog 4U TID  Current inpatient regimen: Lantus 30U qHS + Humalog 20U TID + SSI 4  Will continue for now and adjust as needed to maintain  - 180

## 2022-08-09 NOTE — PROGRESS NOTES
Progress Note - Infectious Disease   Kelli Red 65 y o  female MRN: [id number]  Unit/Bed#: [facility] 429-01 Encounter: [id number]      Impression/Recommendations:  1  Probable right-sided empyema   CT show loculated right pleural effusion   Patient is status post placement of chest tube on 07/29, with pleural fluid parameters consistent with empyema   Culture has no growth, likely due to prior antibiotic   Given MRSA in right chest wound, MRSA is likely pathogen in empyema   Despite chest tube drainage, repeat chest CT on 08/02 showed persistent and unchanged loculated empyema  Patient is now status post VATS/decortication  Operative culture with no growth thus far, and will likely have no growth, due to prolonged antibiotic prior to surgery  Continue IV vancomycin  Monitor temperature/WBC  Follow-up operative culture  Continue chest tube drainage per thoracic surgery  Treat x 2 weeks after VATS, through 8/20      2  Developing sepsis, with leukocytosis and tachypnea, likely secondary to empyema above   Patient is clinically and systemically improved   WBC has normalized   Tachypnea resolved  Hosea Galvan Antibiotic plan as in below  Monitor temperature/WBC  Monitor hemodynamics      3  MRSA right chest wall infection, as site recent/prior chest tube placement for spontaneous pneumothorax  Antibiotic plan as in above  Serial exams      4  Acute on chronic hypoxic respiratory failure, likely multifactorial   Respiratory culture with MRSA and Pseudomonas but no obvious pneumonia   These isolates are most likely airway colonization  Management per primary service      5  Recent spontaneous pneumothorax, status post chest tube placement on 07/20   This was removed on 07/24      6  DM, type 2, poorly controlled, with hyperglycemia and elevated hemoglobin A1c   This is risk factor for infection above  Management per primary service      7  MODESTO   Creatinine stable  Antibiotic dosages adjusted accordingly    Monitor creatinine      Discussed with patient in detail regarding the above plan      Antibiotics:  Vancomycin # 14  POD # 3     Subjective:  Patient still with significant  right-sided chest wall pain, controlled  Temperature stays down   No chills  She is tolerating antibiotic well   No nausea, vomiting or diarrhea      Objective:  Vitals:  Temp:  [98 4 °F (36 9 °C)-98 9 °F (37 2 °C)] 98 7 °F (37 1 °C)  HR:  [62-70] 62  Resp:  [18-24] 18  BP: (116-140)/(67-82) 140/77  SpO2:  [94 %-98 %] 95 %  Temp (24hrs), Av 6 °F (37 °C), Min:98 4 °F (36 9 °C), Max:98 9 °F (37 2 °C)  Current: Temperature: 98 7 °F (37 1 °C)    Physical Exam:     General: Awake, alert, cooperative, no distress  Neck:  Supple  No mass  No lymphadenopathy  Lungs: Decreased breath sounds on right, stable right basilar rhonchi and rales, no wheezing, respirations unlabored  Heart:  Regular rate and rhythm, S1 and S2 normal, no murmur  Abdomen: Soft, nondistended, non-tender, bowel sounds active all four quadrants, no masses, no organomegaly  Extremities: Stable mild leg edema  No erythema/warmth  No ulcer  Nontender to palpation  Skin:  No rash  Neuro: Moves all extremities  Invasive Devices  Report    Peripheral Intravenous Line  Duration           Peripheral IV 22 Left Arm 3 days    Peripheral IV 22 Right Hand 3 days          Drain  Duration           Urethral Catheter Latex 16 Fr  3 days    Chest Tube 1 Right Pleural 28 Fr  2 days    Chest Tube 2 Right Pleural 28 Fr  2 days          Airway  Duration           Surgical Airway Shiley Fenestrated 160 days                Labs studies:   I have personally reviewed pertinent labs    Results from last 7 days   Lab Units 22  0840 22  0435 22  1856 22  1220 22  1118 22  1058 22  0951 22  0454 22  0559   POTASSIUM mmol/L 4 8 4 4 4 4   < >  --   --   --    < > 3 7   CHLORIDE mmol/L 103 105 102   < >  --   --   --    < > 101 CO2 mmol/L 29 29 28   < >  --   --   --    < > 26   CO2, I-STAT mmol/L  --   --   --   --  26 25 30   < >  --    BUN mg/dL 32* 41* 43*   < >  --   --   --    < > 39*   CREATININE mg/dL 1 32* 1 34* 1 39*   < >  --   --   --    < > 1 34*   EGFR ml/min/1 73sq m 45 44 42   < >  --   --   --    < > 44   GLUCOSE, ISTAT mg/dl  --   --   --   --  220* 201* 224*  --   --    CALCIUM mg/dL 8 4 8 1* 7 7*   < >  --   --   --    < > 8 9   AST U/L  --   --  19  --   --   --   --   --  33   ALT U/L  --   --  11*  --   --   --   --   --  18   ALK PHOS U/L  --   --  56  --   --   --   --   --  81    < > = values in this interval not displayed  Results from last 7 days   Lab Units 08/09/22  0840 08/08/22  0435 08/07/22  1856   WBC Thousand/uL 8 92 8 89 11 08*   HEMOGLOBIN g/dL 9 1* 8 4* 7 7*   PLATELETS Thousands/uL 446* 373 387     Results from last 7 days   Lab Units 08/06/22  1011 08/06/22  1007   GRAM STAIN RESULT  Rare Polys  No bacteria seen 1+ Polys  No organisms seen   BODY FLUID CULTURE, STERILE  No growth  --        Imaging Studies:   I have personally reviewed pertinent imaging study reports and images in PACS  EKG, Pathology, and Other Studies:   I have personally reviewed pertinent reports

## 2022-08-09 NOTE — ASSESSMENT & PLAN NOTE
· Recurrent loculated right pleural effusion  S/P right-sided chest tube insertion and removal on 07/24  · CT of the chest on 7/28/22 revealed moderate partially loculated right pleural effusion  · Right chest tube re-placed on 7/29/22 by IR  · Pleural fluid analysis shows neutrophil predominant WBC count  · Fluid cultures from prior anterior chest tube site positive for MRSA  · 8/1 - CXR: Small component of right pleural effusion suspected which may be partially loculated  Indwelling right thoracostomy tube without pneumothorax  · 8/2 CT Chest:  Decreased size of a right-sided pleural effusion and improved aeration of the right lower lobe post placement of a right pleural drainage catheter, with unchanged appearance of a loculated component of the effusion superomedially  There is slightly  increased density of the effusion compatible with a small amount of blood products  2   Appearance of new inflammatory groundglass opacities in the left lower lobe  3   Mild background interstitial edema is unchanged  · 8/6 - S/P VATS Decortation  procedure  · 8/9 - Apical chest tube removed by thoracic surgery   · 8/11 - patient medically cleared for discharge to [facility]  Awaiting insurance authorization     · 8/11- PICC line placed   Plan:  - Continue Abx w/ Vanc given MRSA, Vanc will need to be continued through 8/16 per ID  - Maintain CT to suction and monitor output  - Aggressive pulmonary toilet  - Monitor fever curve and white count closely - patient has remained afebrile with stable white count

## 2022-08-09 NOTE — ASSESSMENT & PLAN NOTE
· MODESTO noted on initial admission to [facility] with elevated Cr 1 64, now resolved  · Baseline Cr being 0 9-1 0  · Continue to monitor closely   Losartan on hold  · Avoid nephrotoxic agents

## 2022-08-09 NOTE — ASSESSMENT & PLAN NOTE
Results from last 7 days   Lab Units 08/09/22  0840 08/08/22  0435 08/07/22  1856 08/07/22  1423 08/07/22  0311 08/06/22  1837 08/06/22  1220   HEMOGLOBIN g/dL 9 1* 8 4* 7 7* 8 2* 7 7* 8 5* 9 4*   HEMATOCRIT % 29 0* 26 1* 24 8* 26 9* 25 1* 27 8* 31 1*     Most likely anemia of chronic disease due to prolonged illness  No active bleeding noted  Hg (8/4): 6 8 s/p  5 unit PRBC's since this admission  Hb stable, monitor closely  Consider transfusing if < 8 due to history of CAD  Continue Iron replacement as well as daily miralax

## 2022-08-09 NOTE — ASSESSMENT & PLAN NOTE
· Continues with intractable pain at prior chest tube site on right anterior chest wall  This was removed 7/24/22  · Was followed by APS back in [place], S/P epidural catheter which was removed 8/2  · Current pain regimen: Diaz Tylenol 975 Q8h, PO Dilaudid 2/4 for mod/severe, IV Dilaudid 0 5 mg Q2  · Bowel regimen:  Scheduled senna and MiraLax daily  · Plan to discontinue IV Dilaudid when Chest tube removed

## 2022-08-09 NOTE — ASSESSMENT & PLAN NOTE
Wt Readings from Last 3 Encounters:   08/09/22 81 kg (178 lb 9 2 oz)   08/02/22 77 7 kg (171 lb 6 4 oz)   07/19/22 78 2 kg (172 lb 6 4 oz)     · Initially admitted to [facility] on 07/15 with CHF exacerbation, now optimized  · Most recent EF 50% with normal systolic and diastolic function on 6/15/1200  Currently stable and euvolemic  · On Bumex 2 mg daily with Aldactone 100 mg daily - restarted on 7/29  · Continue low-salt diet, strict I's and O's monitoring  · Per Cardiology at [facility]3 upon discharge    · Will need follow-up with General Cardiology & EP service upon discharge for ICD consideration

## 2022-08-09 NOTE — QUICK NOTE
08/09/22    Procedure: R posterior apical chest tube removal    Chest tube removed in routine fashion without incident  The patient tolerated the procedure well  A dry, sterile dressing was placed   Will check a pa/lat chest x-ray in the Saadia Paniagua MD  1:26 PM  08/09/22

## 2022-08-09 NOTE — PHYSICAL THERAPY NOTE
Physical Therapy Cancellation Note           08/09/22 0998   Note Type   Note Type Cancelled Session   Cancel Reasons Patient off floor/test       Pt is leaving for testing; will follow as clinical course allows      Andre Coley, PT

## 2022-08-09 NOTE — ASSESSMENT & PLAN NOTE
· Trach placed in the context of cardiac arrest/prolonged ventilator dependent state November 2021  · Decannulated at [facility] 12/11/21  · Patient requires frequent tracheostomy changes and suctioning  · Per discussion with speech therapy,  video barium swallow was done for sense of food getting stuck   Appropriate for regular diet  · On trach collar O2 with humidification and tolerating well btw 6-8L

## 2022-08-09 NOTE — ASSESSMENT & PLAN NOTE
· STEMI 10/22/2021 s/p PATRICA mid circumflex OM1  OM2 was ballooned but not stented    · Pulseless VT 10/27/21 - repeat LHC 90% mid circumflex stenosis, but could not be intervened on  · VT 10/28/21 LHC:  found to have apical LAD complete occlusion was felt to be small to be intervened    · Cardiac carrest 1/1/22 - NO cardiac cath at [facility] felt to be hypoxic vs  VT induced  · On metoprolol 50mg BID, Brilinta 90 mg BID and Lipitor 40 mg qd

## 2022-08-10 ENCOUNTER — APPOINTMENT (INPATIENT)
Dept: RADIOLOGY | Facility: HOSPITAL | Age: 57
DRG: 853 | End: 2022-08-10
Payer: COMMERCIAL

## 2022-08-10 LAB
ANION GAP SERPL CALCULATED.3IONS-SCNC: 5 MMOL/L (ref 4–13)
BACTERIA SPEC ANAEROBE CULT: NO GROWTH
BASOPHILS # BLD AUTO: 0.02 THOUSANDS/ΜL (ref 0–0.1)
BASOPHILS NFR BLD AUTO: 0 % (ref 0–1)
BUN SERPL-MCNC: 30 MG/DL (ref 5–25)
CALCIUM SERPL-MCNC: 8.7 MG/DL (ref 8.3–10.1)
CHLORIDE SERPL-SCNC: 100 MMOL/L (ref 96–108)
CO2 SERPL-SCNC: 31 MMOL/L (ref 21–32)
CREAT SERPL-MCNC: 1.23 MG/DL (ref 0.6–1.3)
EOSINOPHIL # BLD AUTO: 0.12 THOUSAND/ΜL (ref 0–0.61)
EOSINOPHIL NFR BLD AUTO: 1 % (ref 0–6)
ERYTHROCYTE [DISTWIDTH] IN BLOOD BY AUTOMATED COUNT: 18.8 % (ref 11.6–15.1)
GFR SERPL CREATININE-BSD FRML MDRD: 49 ML/MIN/1.73SQ M
GLUCOSE SERPL-MCNC: 114 MG/DL (ref 65–140)
GLUCOSE SERPL-MCNC: 170 MG/DL (ref 65–140)
GLUCOSE SERPL-MCNC: 210 MG/DL (ref 65–140)
GLUCOSE SERPL-MCNC: 216 MG/DL (ref 65–140)
GLUCOSE SERPL-MCNC: 91 MG/DL (ref 65–140)
HCT VFR BLD AUTO: 30.5 % (ref 34.8–46.1)
HGB BLD-MCNC: 9.7 G/DL (ref 11.5–15.4)
IMM GRANULOCYTES # BLD AUTO: 0.08 THOUSAND/UL (ref 0–0.2)
IMM GRANULOCYTES NFR BLD AUTO: 1 % (ref 0–2)
LYMPHOCYTES # BLD AUTO: 1.31 THOUSANDS/ΜL (ref 0.6–4.47)
LYMPHOCYTES NFR BLD AUTO: 14 % (ref 14–44)
MCH RBC QN AUTO: 26.1 PG (ref 26.8–34.3)
MCHC RBC AUTO-ENTMCNC: 31.8 G/DL (ref 31.4–37.4)
MCV RBC AUTO: 82 FL (ref 82–98)
MONOCYTES # BLD AUTO: 0.93 THOUSAND/ΜL (ref 0.17–1.22)
MONOCYTES NFR BLD AUTO: 10 % (ref 4–12)
NEUTROPHILS # BLD AUTO: 6.78 THOUSANDS/ΜL (ref 1.85–7.62)
NEUTS SEG NFR BLD AUTO: 74 % (ref 43–75)
NRBC BLD AUTO-RTO: 0 /100 WBCS
PLATELET # BLD AUTO: 514 THOUSANDS/UL (ref 149–390)
PMV BLD AUTO: 9.5 FL (ref 8.9–12.7)
POTASSIUM SERPL-SCNC: 4 MMOL/L (ref 3.5–5.3)
RBC # BLD AUTO: 3.71 MILLION/UL (ref 3.81–5.12)
SODIUM SERPL-SCNC: 136 MMOL/L (ref 135–147)
VANCOMYCIN TROUGH SERPL-MCNC: 23.7 UG/ML (ref 10–20)
WBC # BLD AUTO: 9.24 THOUSAND/UL (ref 4.31–10.16)

## 2022-08-10 PROCEDURE — 99232 SBSQ HOSP IP/OBS MODERATE 35: CPT | Performed by: FAMILY MEDICINE

## 2022-08-10 PROCEDURE — 97535 SELF CARE MNGMENT TRAINING: CPT

## 2022-08-10 PROCEDURE — 99024 POSTOP FOLLOW-UP VISIT: CPT | Performed by: THORACIC SURGERY (CARDIOTHORACIC VASCULAR SURGERY)

## 2022-08-10 PROCEDURE — 85025 COMPLETE CBC W/AUTO DIFF WBC: CPT | Performed by: FAMILY MEDICINE

## 2022-08-10 PROCEDURE — 80202 ASSAY OF VANCOMYCIN: CPT | Performed by: FAMILY MEDICINE

## 2022-08-10 PROCEDURE — 0WP930Z REMOVAL OF DRAINAGE DEVICE FROM RIGHT PLEURAL CAVITY, PERCUTANEOUS APPROACH: ICD-10-PCS | Performed by: FAMILY MEDICINE

## 2022-08-10 PROCEDURE — 97530 THERAPEUTIC ACTIVITIES: CPT

## 2022-08-10 PROCEDURE — 99232 SBSQ HOSP IP/OBS MODERATE 35: CPT | Performed by: INTERNAL MEDICINE

## 2022-08-10 PROCEDURE — 82948 REAGENT STRIP/BLOOD GLUCOSE: CPT

## 2022-08-10 PROCEDURE — 71046 X-RAY EXAM CHEST 2 VIEWS: CPT

## 2022-08-10 PROCEDURE — 94760 N-INVAS EAR/PLS OXIMETRY 1: CPT

## 2022-08-10 PROCEDURE — 80048 BASIC METABOLIC PNL TOTAL CA: CPT | Performed by: FAMILY MEDICINE

## 2022-08-10 RX ORDER — VANCOMYCIN HYDROCHLORIDE 1 G/200ML
1000 INJECTION, SOLUTION INTRAVENOUS EVERY 24 HOURS
Status: DISCONTINUED | OUTPATIENT
Start: 2022-08-11 | End: 2022-08-13

## 2022-08-10 RX ORDER — MIRTAZAPINE 15 MG/1
7.5 TABLET, FILM COATED ORAL
Status: DISCONTINUED | OUTPATIENT
Start: 2022-08-10 | End: 2022-08-18 | Stop reason: HOSPADM

## 2022-08-10 RX ADMIN — TICAGRELOR 90 MG: 90 TABLET ORAL at 21:24

## 2022-08-10 RX ADMIN — MIRTAZAPINE 7.5 MG: 15 TABLET, FILM COATED ORAL at 21:24

## 2022-08-10 RX ADMIN — PREGABALIN 25 MG: 25 CAPSULE ORAL at 17:39

## 2022-08-10 RX ADMIN — ESCITALOPRAM OXALATE 10 MG: 10 TABLET ORAL at 08:56

## 2022-08-10 RX ADMIN — INSULIN LISPRO 10 UNITS: 100 INJECTION, SOLUTION INTRAVENOUS; SUBCUTANEOUS at 09:03

## 2022-08-10 RX ADMIN — ATORVASTATIN CALCIUM 40 MG: 40 TABLET, FILM COATED ORAL at 17:39

## 2022-08-10 RX ADMIN — INSULIN LISPRO 10 UNITS: 100 INJECTION, SOLUTION INTRAVENOUS; SUBCUTANEOUS at 11:25

## 2022-08-10 RX ADMIN — SPIRONOLACTONE 100 MG: 50 TABLET ORAL at 08:55

## 2022-08-10 RX ADMIN — TICAGRELOR 90 MG: 90 TABLET ORAL at 08:58

## 2022-08-10 RX ADMIN — APIXABAN 5 MG: 5 TABLET, FILM COATED ORAL at 08:58

## 2022-08-10 RX ADMIN — VANCOMYCIN HYDROCHLORIDE 1250 MG: 10 INJECTION, POWDER, LYOPHILIZED, FOR SOLUTION INTRAVENOUS at 13:35

## 2022-08-10 RX ADMIN — METOPROLOL SUCCINATE 50 MG: 50 TABLET, EXTENDED RELEASE ORAL at 17:39

## 2022-08-10 RX ADMIN — LIDOCAINE 5% 2 PATCH: 700 PATCH TOPICAL at 08:56

## 2022-08-10 RX ADMIN — APIXABAN 5 MG: 5 TABLET, FILM COATED ORAL at 17:39

## 2022-08-10 RX ADMIN — INSULIN LISPRO 2 UNITS: 100 INJECTION, SOLUTION INTRAVENOUS; SUBCUTANEOUS at 17:39

## 2022-08-10 RX ADMIN — INSULIN LISPRO 4 UNITS: 100 INJECTION, SOLUTION INTRAVENOUS; SUBCUTANEOUS at 11:25

## 2022-08-10 RX ADMIN — HYDROMORPHONE HYDROCHLORIDE 2 MG: 2 TABLET ORAL at 17:43

## 2022-08-10 RX ADMIN — INSULIN LISPRO 4 UNITS: 100 INJECTION, SOLUTION INTRAVENOUS; SUBCUTANEOUS at 21:24

## 2022-08-10 RX ADMIN — AMIODARONE HYDROCHLORIDE 100 MG: 200 TABLET ORAL at 09:06

## 2022-08-10 RX ADMIN — HYDROMORPHONE HYDROCHLORIDE 4 MG: 2 TABLET ORAL at 11:25

## 2022-08-10 RX ADMIN — METOPROLOL SUCCINATE 50 MG: 50 TABLET, EXTENDED RELEASE ORAL at 05:52

## 2022-08-10 RX ADMIN — INSULIN LISPRO 10 UNITS: 100 INJECTION, SOLUTION INTRAVENOUS; SUBCUTANEOUS at 17:40

## 2022-08-10 RX ADMIN — BUMETANIDE 2 MG: 2 TABLET ORAL at 08:55

## 2022-08-10 RX ADMIN — HYDROMORPHONE HYDROCHLORIDE 0.5 MG: 1 INJECTION, SOLUTION INTRAMUSCULAR; INTRAVENOUS; SUBCUTANEOUS at 12:14

## 2022-08-10 RX ADMIN — PREGABALIN 75 MG: 75 CAPSULE ORAL at 08:56

## 2022-08-10 RX ADMIN — PANTOPRAZOLE SODIUM 40 MG: 40 TABLET, DELAYED RELEASE ORAL at 05:50

## 2022-08-10 NOTE — PROGRESS NOTES
Progress Note - Infectious Disease   Kelli Red 65 y o  female MRN: [id number]  Unit/Bed#: [facility] 429-01 Encounter: [id number]      Impression/Recommendations:  1  Probable right-sided empyema   CT show loculated right pleural effusion   Patient is status post placement of chest tube on 07/29, with pleural fluid parameters consistent with empyema   Culture has no growth, likely due to prior antibiotic   Given MRSA in right chest wound, MRSA is likely pathogen in empyema   Despite chest tube drainage, repeat chest CT on 08/02 showed persistent and unchanged loculated empyema   Patient is now status post VATS/decortication  Operative culture with no growth thus far, and will likely have no growth, due to prolonged antibiotic prior to surgery  Chest tube was removed earlier today  Continue IV vancomycin  Monitor temperature/WBC  Follow-up final operative culture  Treat x 2 weeks after VATS, through 8/20      2  Developing sepsis, with leukocytosis and tachypnea, likely secondary to empyema above   Patient is clinically and systemically improved   WBC has normalized   Tachypnea resolved  Sandra Gamboa Antibiotic plan as in below  Monitor temperature/WBC  Monitor hemodynamics      3  MRSA right chest wall infection, as site recent/prior chest tube placement for spontaneous pneumothorax  Antibiotic plan as in above  Serial exams      4  Acute on chronic hypoxic respiratory failure, likely multifactorial   Respiratory culture with MRSA and Pseudomonas but no obvious pneumonia   These isolates are most likely airway colonization  Management per primary service      5  Recent spontaneous pneumothorax, status post chest tube placement on 07/20   This was removed on 07/24      6  DM, type 2, poorly controlled, with hyperglycemia and elevated hemoglobin A1c   This is risk factor for infection above  Management per primary service      7  MODESTO  Creatinine improving  Antibiotic dosages adjusted accordingly    Monitor creatinine      Discussed with patient in detail regarding the above plan      Antibiotics:  Vancomycin # 15  POD # 4     Subjective:  Patient still with significant  right-sided chest wall pain, controlled  Temperature stays down   No chills  She is tolerating antibiotic well   No nausea, vomiting or diarrhea      Objective:  Vitals:  Temp:  [97 2 °F (36 2 °C)-98 7 °F (37 1 °C)] 98 6 °F (37 °C)  HR:  [56-64] 64  Resp:  [16-18] 16  BP: (123-149)/(68-83) 127/75  SpO2:  [92 %-97 %] 92 %  Temp (24hrs), Av 4 °F (36 9 °C), Min:97 2 °F (36 2 °C), Max:98 7 °F (37 1 °C)  Current: Temperature: 98 6 °F (37 °C)    Physical Exam:     General: Awake, alert, cooperative, no distress  Neck:  Supple  No mass  No lymphadenopathy  Lungs: Decreased breath sounds on right, mild right basilar rhonchi and rales, no wheezing, respirations unlabored  Heart:  Regular rate and rhythm, S1 and S2 normal, no murmur  Abdomen: Soft, nondistended, non-tender, bowel sounds active all four quadrants, no masses, no organomegaly  Extremities: Stable mild leg edema  No erythema/warmth  No ulcer  Nontender to palpation  Skin:  No rash  Neuro: Moves all extremities  Invasive Devices  Report    Peripheral Intravenous Line  Duration           Peripheral IV 08/10/22 Proximal;Right;Ventral (anterior) Forearm <1 day          Airway  Duration           Surgical Airway Shiley Fenestrated 161 days                Labs studies:   I have personally reviewed pertinent labs    Results from last 7 days   Lab Units 08/10/22  0406 22  0840 22  0435 22  1856 22  1220 22  1118 22  1058 22  0951   POTASSIUM mmol/L 4 0 4 8 4 4 4 4   < >  --   --   --    CHLORIDE mmol/L 100 103 105 102   < >  --   --   --    CO2 mmol/L 31 29 29 28   < >  --   --   --    CO2, I-STAT mmol/L  --   --   --   --   --  26 25 30   BUN mg/dL 30* 32* 41* 43*   < >  --   --   --    CREATININE mg/dL 1 23 1 32* 1 34* 1 39*   < >  --   -- --    EGFR ml/min/1 73sq m 49 45 44 42   < >  --   --   --    GLUCOSE, ISTAT mg/dl  --   --   --   --   --  220* 201* 224*   CALCIUM mg/dL 8 7 8 4 8 1* 7 7*   < >  --   --   --    AST U/L  --   --   --  19  --   --   --   --    ALT U/L  --   --   --  11*  --   --   --   --    ALK PHOS U/L  --   --   --  56  --   --   --   --     < > = values in this interval not displayed  Results from last 7 days   Lab Units 08/10/22  0406 08/09/22  0840 08/08/22  0435   WBC Thousand/uL 9 24 8 92 8 89   HEMOGLOBIN g/dL 9 7* 9 1* 8 4*   PLATELETS Thousands/uL 514* 446* 373     Results from last 7 days   Lab Units 08/06/22  1011 08/06/22  1007   GRAM STAIN RESULT  Rare Polys  No bacteria seen 1+ Polys  No organisms seen   BODY FLUID CULTURE, STERILE  No growth  --        Imaging Studies:   I have personally reviewed pertinent imaging study reports and images in PACS  EKG, Pathology, and Other Studies:   I have personally reviewed pertinent reports

## 2022-08-10 NOTE — ASSESSMENT & PLAN NOTE
Results from last 7 days   Lab Units 08/11/22  0523 08/10/22  0406 08/09/22  0840 08/08/22  0435 08/07/22  1856 08/07/22  1423 08/07/22  0311   HEMOGLOBIN g/dL 10 2* 9 7* 9 1* 8 4* 7 7* 8 2* 7 7*   HEMATOCRIT % 34 9 30 5* 29 0* 26 1* 24 8* 26 9* 25 1*     Most likely anemia of chronic disease due to prolonged illness  No active bleeding noted  Hg (8/4): 6 8 s/p  5 unit PRBC's since this admission  Hb stable, monitor closely  Consider transfusing if < 8 due to history of CAD  Continue Iron replacement as well as daily miralax

## 2022-08-10 NOTE — ARC ADMISSION
ARC continues to follow patient's case at this time, monitoring for medical stability and functional progress  Noted that right chest tube remains intact, which would need to be removed prior to consideration for ARC  PM&R to follow-up this week  Will update as able

## 2022-08-10 NOTE — PLAN OF CARE
Problem: PHYSICAL THERAPY ADULT  Goal: Performs mobility at highest level of function for planned discharge setting  See evaluation for individualized goals  Description: Treatment/Interventions: Functional transfer training, LE strengthening/ROM, Therapeutic exercise, Endurance training, Equipment eval/education, Bed mobility, Gait training, Spoke to nursing  Equipment Recommended: Paula Leonard       See flowsheet documentation for full assessment, interventions and recommendations  Outcome: Progressing  Note: Prognosis: Good  Problem List: Decreased strength, Decreased endurance, Impaired balance, Decreased mobility  Assessment: Pt demonstrated overall improvement in functional mobility skills, balance and overall participation in activities progressing w/ amb distance and requiring less (A) as well; pt still remains to be generally weak and deconditioned w/ limited functional endurance requiring frequent rest periods to avoid excessive fatigue  Overall, cont to recommend rehab upon D/C when medically cleared; will follow  Barriers to Discharge: Inaccessible home environment     PT Discharge Recommendation: Post acute rehabilitation services    See flowsheet documentation for full assessment

## 2022-08-10 NOTE — OCCUPATIONAL THERAPY NOTE
Occupational Therapy Progress Note     Patient Name: Damaris Vazquez  AEWNY'N Date: 8/10/2022  Problem List  Principal Problem:    Empyema lung, Right  Active Problems:    Anxiety    Diabetic peripheral neuropathy (HCC)    Hyperlipidemia associated with type 2 diabetes mellitus ([facility] )    Hypertension    Type 2 diabetes mellitus with hyperglycemia ([facility])    A-fib ([facility])    CAD (coronary artery disease)    Tracheostomy in place (HCC)    Anemia    CKD (chronic kidney disease) stage 3, GFR 30-59 ml/min ([facility])    Acute Respiratory insufficiency on chronic hypoxic respiratory failure    Pain at Chest tube insertion site  Chest wall wound infection    Acute on chronic heart failure with preserved ejection fraction ([facility] )            08/10/22 0850   OT Last Visit   OT Visit Date 08/10/22   Note Type   Note Type Treatment   Restrictions/Precautions   Other Precautions Multiple lines;Telemetry;O2  (trach collar; CT)   General   Response to Previous Treatment Patient with no complaints from previous session   Lifestyle   Autonomy Pt reports being I with ADLS, requires A with IADLS and uses RW for mobility PTA     Reciprocal Relationships Pt lives with supportive family who are able to assist   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, was a HHA   Intrinsic Gratification Spending time with her son   Pain Assessment   Pain Assessment Tool FLACC   Pain Rating: FLACC (Rest) - Face 1   Pain Rating: FLACC (Rest) - Legs 0   Pain Rating: FLACC (Rest) - Activity 0   Pain Rating: FLACC (Rest) - Cry 0   Pain Rating: FLACC (Rest) - Consolability 0   Score: FLACC (Rest) 1   Pain Rating: FLACC (Activity) - Face 1   Pain Rating: FLACC (Activity) - Legs 0   Pain Rating: FLACC (Activity) - Activity 0   Pain Rating: FLACC (Activity) - Cry 1   Pain Rating: FLACC (Activity) - Consolability 0   Score: FLACC (Activity) 2   ADL   Where Assessed Chair   Grooming Assistance 5  Supervision/Setup   Grooming Deficit Setup;Supervision/safety   Grooming Comments Pt washed her face and brushed teeth with setup A   UB Bathing Assistance 4  Minimal Assistance   UB Bathing Deficit Setup;Supervision/safety; Increased time to complete   UB Bathing Comments A for back   LB Bathing Assistance 3  Moderate Assistance   LB Bathing Deficit Setup;Supervision/safety; Increased time to complete;Right lower leg including foot; Left lower leg including foot   UB Dressing Assistance 4  Minimal Assistance   UB Dressing Deficit Setup;Supervision/safety   UB Dressing Comments A to [facility] gown   Toileting Assistance  3  Moderate Assistance   Toileting Deficit Setup;Supervison/safety; Increased time to complete   Toileting Comments A for hygiene after BM   Transfers   Sit to Stand 4  Minimal assistance   Additional items Assist x 1; Increased time required;Verbal cues   Stand to Sit 4  Minimal assistance   Additional items Assist x 1; Increased time required;Verbal cues   Toilet transfer 4  Minimal assistance   Additional items Assist x 1; Increased time required;Verbal cues; Commode   Functional Mobility   Functional Mobility 4  Minimal assistance   Additional Comments Pt took a few steps to [facility] and back with RW  Additional items Rolling walker   Cognition   Overall Cognitive Status WFL   Arousal/Participation Alert; Cooperative   Attention Within functional limits   Orientation Level Oriented X4   Memory Decreased recall of precautions   Following Commands Follows one step commands without difficulty   Comments Pt is pleasant and cooperative throughout  Activity Tolerance   Activity Tolerance Patient limited by fatigue   Medical Staff Made Aware RN confirmed okay to see pt   Assessment   Assessment Patient participated in Skilled OT session this date with interventions consisting of ADL re training with the use of correct body mechanics and  therapeutic activities to: increase activity tolerance    Patient agreeable to OT treatment session, upon arrival patient was found seated OOB to Chair  In comparison to previous session, patient with improvements in activity tolerance overall  Patient requiring frequent rest periods  Patient continues to be functioning below baseline level, occupational performance remains limited secondary to factors listed above and increased risk for falls and injury  From OT standpoint, recommendation at time of d/c would be Short Term Rehab  Patient to benefit from continued Occupational Therapy treatment while in the hospital to address deficits as defined above and maximize level of functional independence with ADLs and functional mobility  Plan   Treatment Interventions ADL retraining;Functional transfer training; Endurance training; Compensatory technique education   Goal Expiration Date 08/22/22   OT Treatment Day 1   OT Frequency 3-5x/wk   Recommendation   OT Discharge Recommendation Post acute rehabilitation services   AM-PAC Daily Activity Inpatient   Lower Body Dressing 2   Bathing 2   Toileting 2   Upper Body Dressing 3   Grooming 4   Eating 4   Daily Activity Raw Score 17   Daily Activity Standardized Score (Calc for Raw Score >=11) 37 26   AM-PAC Applied Cognition Inpatient   Following a Speech/Presentation 3   Understanding Ordinary Conversation 4   Taking Medications 3   Remembering Where Things Are Placed or Put Away 4   Remembering List of 4-5 Errands 4   Taking Care of Complicated Tasks 3   Applied Cognition Raw Score 21   Applied Cognition Standardized Score 44 3   Modified Chester Scale   Modified Chester Scale 4       Rinaa oLpez, MOT, OTR/L

## 2022-08-10 NOTE — PLAN OF CARE
Problem: MOBILITY - ADULT  Goal: Maintain or return to baseline ADL function  Description: INTERVENTIONS:  -  Assess patient's ability to carry out ADLs; assess patient's baseline for ADL function and identify physical deficits which impact ability to perform ADLs (bathing, care of mouth/teeth, toileting, grooming, dressing, etc )  - Assess/evaluate cause of self-care deficits   - Assess range of motion  - Assess patient's mobility; develop plan if impaired  - Assess patient's need for assistive devices and provide as appropriate  - Encourage maximum independence but intervene and supervise when necessary  - Involve family in performance of ADLs  - Assess for home care needs following discharge   - Consider OT consult to assist with ADL evaluation and planning for discharge  - Provide patient education as appropriate  Outcome: Progressing  Goal: Maintains/Returns to pre admission functional level  Description: INTERVENTIONS:  - Perform BMAT or MOVE assessment daily    - Set and communicate daily mobility goal to care team and patient/family/caregiver  - Collaborate with rehabilitation services on mobility goals if consulted  - Perform Range of Motion 3 times a day  - Reposition patient every 2 hours    - Stand patient 3 times a day  - Out of bed to chair 3 times a day   - Out of bed for meals 3 times a day  - Out of bed for toileting  - Record patient progress and toleration of activity level   Outcome: Progressing     Problem: RESPIRATORY - ADULT  Goal: Achieves optimal ventilation and oxygenation  Description: INTERVENTIONS:  - Assess for changes in respiratory status  - Assess for changes in mentation and behavior  - Position to facilitate oxygenation and minimize respiratory effort  - Oxygen administered by appropriate delivery if ordered  - Initiate smoking cessation education as indicated  - Encourage broncho-pulmonary hygiene including cough, deep breathe, Incentive Spirometry  - Assess the need for suctioning and aspirate as needed  - Assess and instruct to report SOB or any respiratory difficulty  - Respiratory Therapy support as indicated  Outcome: Progressing     Problem: Prexisting or High Potential for Compromised Skin Integrity  Goal: Skin integrity is maintained or improved  Description: INTERVENTIONS:  - Identify patients at risk for skin breakdown  - Assess and monitor skin integrity  - Assess and monitor nutrition and hydration status  - Monitor labs   - Assess for incontinence   - Turn and reposition patient  - Assist with mobility/ambulation  - Relieve pressure over bony prominences  - Avoid friction and shearing  - Provide appropriate hygiene as needed including keeping skin clean and dry  - Evaluate need for skin moisturizer/barrier cream  - Collaborate with interdisciplinary team   - Patient/family teaching  - Consider wound care consult   Outcome: Progressing     Problem: Potential for Falls  Goal: Patient will remain free of falls  Description: INTERVENTIONS:  - Educate patient/family on patient safety including physical limitations  - Instruct patient to call for assistance with activity   - Consult OT/PT to assist with strengthening/mobility   - Keep Call bell within reach  - Keep bed low and locked with side rails adjusted as appropriate  - Keep care items and personal belongings within reach  - Initiate and maintain comfort rounds  - Make Fall Risk Sign visible to staff  - Offer Toileting every 2 Hours, in advance of need  - Initiate/Maintain bed alarm  - Obtain necessary fall risk management equipment  - Apply yellow socks and bracelet for high fall risk patients  - Consider moving patient to room near nurses station  Outcome: Progressing     Problem: CARDIOVASCULAR - ADULT  Goal: Maintains optimal cardiac output and hemodynamic stability  Description: INTERVENTIONS:  - Monitor I/O, vital signs and rhythm  - Monitor for S/S and trends of decreased cardiac output  - Administer and titrate ordered vasoactive medications to optimize hemodynamic stability  - Assess quality of pulses, skin color and temperature  - Assess for signs of decreased coronary artery perfusion  - Instruct patient to report change in severity of symptoms  Outcome: Progressing  Goal: Absence of cardiac dysrhythmias or at baseline rhythm  Description: INTERVENTIONS:  - Continuous cardiac monitoring, vital signs, obtain 12 lead EKG if ordered  - Administer antiarrhythmic and heart rate control medications as ordered  - Monitor electrolytes and administer replacement therapy as ordered  Outcome: Progressing

## 2022-08-10 NOTE — ASSESSMENT & PLAN NOTE
Lab Results   Component Value Date    HGBA1C 12 7 (H) 05/22/2022       Recent Labs     08/10/22  1559 08/10/22  2107 08/11/22  0537 08/11/22  1038   POCGLU 170* 216* 173* 166*     Uncontrolled per most recent A1c but gradually improving glycemic control  Home regimen: Lantus 16U QHS and Novolog 4U TID  Previous inpatient regimen: Lantus 30U qHS + Humalog 20U TID + SSI 4  Lantus now d/c'd and Humalog changed to 10U TID  Will continue for now and adjust as needed to maintain  - 180

## 2022-08-10 NOTE — QUICK NOTE
08/10/22    Procedure: R Basilar Chest tube removal    R Chest tube removed in routine fashion without incident  The patient tolerated the procedure well  A dry, sterile dressing was placed  Will check a pa/lat chest x-ray         Audi Gómez MD  1:01 PM  08/10/22

## 2022-08-10 NOTE — WOUND OSTOMY CARE
Progress Note - Wound   Jodie Aus Vanness 65 y o  female MRN: [id number]  Unit/Bed#: [facility] 429-01 Encounter: [id number]        Assessment:  Wound care consulted for assessment of sacral wound  Patient admitted with R empyema of the lung  History of - tracheostomy, DM, CHF, CKD, anemia, CAD, A-fib, HTN, and anxiety  Patient seen in chair, alert and oriented x 4, cooperative and agreeable for the assessment  Minimal assist of one with standing for the assessment  Incontinent of urine and stool      B/l heels are dry and intact without redness or wounds       1  POA stage II now a full thickness unstageable- round/oval shaped, full thickness wound, 25% moist pink/red tissue and 75% yellow slough tissue  Edges fragile and attached without maceration  Calazime cream applied         Educated patient on the importance of frequent offloading of pressure via turning, repositioning and weight-shifting  Verbalized understanding of plan of care      No induration, fluctuance, odor, warmth/temperature differences, redness, or purulence noted to the above noted wounds and skin areas assessed  Patient tolerated assessment well- denies pain and no s/s of non-verbal pain or discomfort observed during the encounter  Primary nurse aware of plan of care  See flow sheets for more detailed assessment findings  Will follow along      Plan: 1  Cleanse sacral with soap and water, pat dry, and apply calazime cream TID and PRN   2  Apply skin nourishing cream the entire skin daily for moisture  3  Turn and reposition patient every  2 hours   4  Elevate heels off of bed with pillows to offload pressure   5  Apply EHOB waffle cushion to chair when OOB, if able  6  Apply Allevyn foam to heels, rené w/P, peel foam check skin integrity q-shift  Change q3d and PRN for soilage/dislodgement  7   Apply hydraguard to the b/l buttocks BID and PRN     Wound 08/02/22 Pressure Injury Sacrum (Active)        Wound Description Yellow;Pink;Slough 08/10/22 1200 Pressure Injury Stage U 08/10/22 0947   Anna-wound Assessment Clean;Dry; Intact 08/10/22 1200   Wound Length (cm) 3 cm 08/10/22 0947   Wound Width (cm) 1 cm 08/10/22 0947   Wound Depth (cm) 0 2 cm 08/10/22 0947   Wound Surface Area (cm^2) 3 cm^2 08/10/22 0947   Wound Volume (cm^3) 0 6 cm^3 08/10/22 0947   Calculated Wound Volume (cm^3) 0 6 cm^3 08/10/22 0947   Change in Wound Size % -300 08/10/22 0947   Tunneling 0 cm 08/10/22 0947   Tunneling in depth located at 0 08/10/22 0947   Undermining 0 08/10/22 0947   Undermining is depth extending from 0 08/10/22 0947   Wound Site Closure BEE 08/10/22 0947   Drainage Amount Small 08/10/22 0947   Drainage Description Serosanguineous 08/10/22 0947   Non-staged Wound Description Full thickness 08/10/22 0947   Treatments Cleansed 08/10/22 0947   Dressing Moisture barrier 08/10/22 1200   Wound packed? No 08/10/22 0947   Packing- # removed 0 08/10/22 5742   Packing- # inserted 0 08/10/22 5029   Dressing Changed New 08/10/22 0947   Patient Tolerance Tolerated well 08/10/22 0947   Dressing Status Clean;Dry; Intact 08/10/22 0947     Unable to photo wound due to malfunction to martinez Joseph BSN, RN, [facility] [employer]

## 2022-08-10 NOTE — ASSESSMENT & PLAN NOTE
· Continues with intractable pain at prior chest tube site on right anterior chest wall    This was removed 7/24/22  · Was followed by APS back in [facility], S/P epidural catheter which was removed 8/2  · Current pain regimen: Diaz Tylenol 975 Q8h, PO Dilaudid 2/4 for mod/severe, IV Dilaudid 0 5 mg Q2  · Bowel regimen:  Scheduled senna and MiraLax daily  · Both chest tubes now removed - last chest tube removed on 8/10 by CTS

## 2022-08-10 NOTE — ASSESSMENT & PLAN NOTE
· Purulent discharge noted at site of recent chest tube placement  · Cultures positive for MRSA on 07/28 susceptible to vanc  · Currently on IV vancomycin - will continue thru 8/16 per ID  · Continue local wound care/dressing changes

## 2022-08-10 NOTE — PROGRESS NOTES
Vancomycin IV Pharmacy-to-Dose Consultation    Brittanie Mcgowan is a 65 y o  female who is currently receiving Vancomycin IV with management by the Pharmacy Consult service  Assessment/Plan:  The patient was reviewed  Renal function is stable and no signs or symptoms of nephrotoxicity and/or infusion reactions were documented in the chart  Vancomycin level was supratherapeutic  Based on todays assessment, change vancomycin to(day # 10) dosing of 1000mg q 24h starting 8/11, with a plan for trough to be drawn at 1330 on 8/13  We will continue to follow the patients culture results and clinical progress daily      Tomás Rand, Pharmacist

## 2022-08-10 NOTE — PHYSICAL THERAPY NOTE
PHYSICAL THERAPY NOTE          Patient Name: Giacomo Lou  KSJIB'P Date: 8/10/2022       08/10/22 0920   PT Last Visit   PT Visit Date 08/10/22   Note Type   Note Type Treatment   Pain Assessment   Pain Assessment Tool FLACC   Pain Location/Orientation Orientation: Right;Location: Chest   Pain Onset/Description Onset: Ongoing;Frequency: Intermittent; Descriptor: Aching;Descriptor: Discomfort   Effect of Pain on Daily Activities some guarding   Patient's Stated Pain Goal No pain   Hospital Pain Intervention(s) Repositioned; Ambulation/increased activity; Emotional support   Pain Rating: FLACC (Rest) - Face 0   Pain Rating: FLACC (Rest) - Legs 0   Pain Rating: FLACC (Rest) - Activity 0   Pain Rating: FLACC (Rest) - Cry 1   Pain Rating: FLACC (Rest) - Consolability 0   Score: FLACC (Rest) 1   Pain Rating: FLACC (Activity) - Face 1   Pain Rating: FLACC (Activity) - Legs 0   Pain Rating: FLACC (Activity) - Activity 0   Pain Rating: FLACC (Activity) - Cry 1   Pain Rating: FLACC (Activity) - Consolability 1   Score: FLACC (Activity) 3   Restrictions/Precautions   Other Precautions Multiple lines;Telemetry;O2;Fall Risk;Contact/isolation   General   Chart Reviewed Yes   Additional Pertinent History cleared for Tx session (spoke to nsg)   Response to Previous Treatment Patient with no complaints from previous session  Cognition   Overall Cognitive Status WFL   Arousal/Participation Alert; Cooperative   Attention Within functional limits   Orientation Level Oriented to situation   Memory Decreased recall of precautions   Following Commands Follows one step commands without difficulty   Subjective   Subjective Alert; in the chair; agreeable to perform therex and mobilize   Transfers   Sit to Stand 3  Moderate assistance   Additional items Assist x 1;Verbal cues   Stand to Sit 3  Moderate assistance   Additional items Assist x 1;Verbal cues Ambulation/Elevation   Gait pattern Excessively slow; Short stride; Foward flexed; Inconsistent esequiel   Gait Assistance 4  Minimal assist   Additional items Assist x 1;Verbal cues; Tactile cues  (chair follow)   Assistive Device Rolling walker   Distance 25 ft; limited by fatigue w/ CONNOLLY; chair ride back to room; O2 sat at 100 % at that point   Balance   Static Sitting Fair +   Dynamic Sitting Fair   Static Standing Fair -   Dynamic Standing Poor +   Ambulatory Poor +   Activity Tolerance   Activity Tolerance Patient limited by fatigue;Treatment limited secondary to medical complications (Comment)   Nurse Made Aware spoke to MONE Mayer   Exercises   Hip Abduction Sitting;15 reps;AAROM; Bilateral   Knee AROM Long Arc Quad Sitting;15 reps;AROM; Bilateral   Ankle Pumps Sitting;15 reps;AROM; Bilateral   Marching Sitting;10 reps;AROM; Bilateral   Assessment   Prognosis Good   Problem List Decreased strength;Decreased endurance; Impaired balance;Decreased mobility   Assessment Pt demonstrated overall improvement in functional mobility skills, balance and overall participation in activities progressing w/ amb distance and requiring less (A) as well; pt still remains to be generally weak and deconditioned w/ limited functional endurance requiring frequent rest periods to avoid excessive fatigue  Overall, cont to recommend rehab upon D/C when medically cleared; will follow  Barriers to Discharge Inaccessible home environment   Goals   Patient Goals to feel better   STG Expiration Date 08/17/22   PT Treatment Day 2   Plan   Treatment/Interventions Functional transfer training;LE strengthening/ROM; Elevations; Therapeutic exercise; Endurance training;Equipment eval/education; Bed mobility;Gait training;Spoke to nursing;OT   Progress Progressing toward goals   PT Frequency Other (Comment)  (3-6x/wk)   Recommendation   PT Discharge Recommendation Post acute rehabilitation services   Equipment Recommended [address] [place] Recommended Wheeled walker   Change/add to [employer]? No   AM-PAC Basic Mobility Inpatient   Turning in Bed Without Bedrails 3   Lying on Back to Sitting on Edge of Flat Bed 2   Moving Bed to Chair 2   Standing Up From Chair 2   Walk in Room 3   Climb 3-5 Stairs 2   Basic Mobility Inpatient Raw Score 14   Basic Mobility Standardized Score 35 55   Highest Level Of Mobility   -HLM Goal 4: Move to chair/commode   -HLM Achieved 7: Walk 25 feet or more   Education   Education Provided Mobility training;Assistive device   Patient Demonstrates verbal understanding   End of Consult   Patient Position at End of Consult Bedside chair; All needs within reach     [facility], PT

## 2022-08-10 NOTE — PROGRESS NOTES
[facility]  Progress Notes - Family Medicine    Joslyn Oconnor 1965, 65 y o  female  MRN: [id number]  Unit/Bed#: [facility] 429-01 Encounter: [id number]  Primary Care Provider: Yolanda Ly MD      Admission Date: 8/2/2022 2031  Length of Stay: 8 days  Code Status: Level 1 - Full Code  Diet: Diet Regular; Regular House        Assessments & Plans:     Disposition: Inpatient    * Empyema lung, Right  Assessment & Plan  Recurrent loculated right pleural effusion  S/P right-sided chest tube insertion and removal on 07/24  CT of the chest on 7/28/22 revealed moderate partially loculated right pleural effusion  Right chest tube re-placed on 7/29/22 by IR  Pleural fluid analysis shows neutrophil predominant WBC count  Fluid cultures from prior anterior chest tube site positive for MRSA  8/1 - CXR: Small component of right pleural effusion suspected which may be partially loculated  Indwelling right thoracostomy tube without pneumothorax  8/2 CT Chest:  Decreased size of a right-sided pleural effusion and improved aeration of the right lower lobe post placement of a right pleural drainage catheter, with unchanged appearance of a loculated component of the effusion superomedially  There is slightly  increased density of the effusion compatible with a small amount of blood products  2   Appearance of new inflammatory groundglass opacities in the left lower lobe  3   Mild background interstitial edema is unchanged  8/6 - S/P VATS Decortation  procedure  8/9 - Apical chest tube removed by thoracic surgery   Plan:  - Continue Abx w/ Vanc given MRSA, Vanc will need to be continued through 8/16 per ID  - Maintain CT to suction and monitor output  - Aggressive pulmonary toilet  - Monitor fever curve and white count closely - patient has remained afebrile with stable white count    Pain at Chest tube insertion site     Assessment & Plan  Continues with intractable pain at prior chest tube site on right anterior chest wall    This was removed 7/24/22  Was followed by APS back in [facility], S/P epidural catheter which was removed 8/2  Current pain regimen: Diaz Tylenol 975 Q8h, PO Dilaudid 2/4 for mod/severe, IV Dilaudid 0 5 mg Q2  Bowel regimen:  Scheduled senna and MiraLax daily  Consider deescalating pain regimen now that CT is removed      Type 2 diabetes mellitus with hyperglycemia [facility])  Assessment & Plan  Lab Results   Component Value Date    HGBA1C 12 7 (H) 05/22/2022       Recent Labs     08/09/22  1548 08/09/22  1623 08/09/22  2110 08/10/22  0540   POCGLU 59* 82 97 114     Uncontrolled per most recent A1c but gradually improving glycemic control  Home regimen: Lantus 16U QHS and Novolog 4U TID  Previous inpatient regimen: Lantus 30U qHS + Humalog 20U TID + SSI 4  Lantus now d/c'd and Humalog changed to 10U TID  Will continue for now and adjust as needed to maintain  - 180    Chest wall wound infection  Assessment & Plan  Purulent discharge noted at site of recent chest tube placement  Cultures positive for MRSA on 07/28 susceptible to vanc  Currently on IV vancomycin - will continue thru 8/16 per ID  Continue local wound care/dressing changes     Anemia  Assessment & Plan    Results from last 7 days   Lab Units 08/10/22  0406 08/09/22  0840 08/08/22  0435 08/07/22  1856 08/07/22  1423 08/07/22  0311 08/06/22  1837   HEMOGLOBIN g/dL 9 7* 9 1* 8 4* 7 7* 8 2* 7 7* 8 5*   HEMATOCRIT % 30 5* 29 0* 26 1* 24 8* 26 9* 25 1* 27 8*     Most likely anemia of chronic disease due to prolonged illness  No active bleeding noted  Hg (8/4): 6 8 s/p  5 unit PRBC's since this admission  Hb stable, monitor closely  Consider transfusing if < 8 due to history of CAD  Continue Iron replacement as well as daily miralax     A-fib ([address] )  Assessment & Plan  Follows closely with Cardiology  Rhythm controlled with amiodarone 100 mg daily and AC with Eliquis 5 mg BID          VTE Pharm PPX: Reason for no pharmacologic prophylaxis Eliquis  VTE [facility] PPX: sequential compression device    Subjective:   Patient with no acute events overnight per handoff  No concern or report per nursing staff  Patient seen and examined at bedside, she denies fever, N/V, chest pain, dizziness, SOB, palpitations or headaches  She is able to tolerate PO although still with poor PO intake/ appetite  Vitals:     Vitals:    08/09/22 1900 08/09/22 2300 08/10/22 0552 08/10/22 0600   BP:  123/68 149/83    BP Location:  Right arm Right arm    Pulse:  56 60    Resp:  16 16    Temp: (!) 97 2 °F (36 2 °C) 98 4 °F (36 9 °C) 98 4 °F (36 9 °C)    TempSrc: Oral Oral Oral    SpO2:  96% 97%    Weight:    80 1 kg (176 lb 9 4 oz)     Temp:  [97 2 °F (36 2 °C)-99 °F (37 2 °C)] 98 4 °F (36 9 °C)  HR:  [56-60] 60  Resp:  [16] 16  BP: (123-149)/(68-83) 149/83  FiO2 (%):  [28] 28  Weight (last 2 days)     Date/Time Weight    08/10/22 0600 80 1 (176 59)    08/09/22 0613 81 (178 57)    08/08/22 0545 80 9 (178 35)          Intake/Output Summary (Last 24 hours) at 8/10/2022 0645  Last data filed at 8/10/2022 0600  Gross per 24 hour   Intake 360 ml   Output 2429 ml   Net -2069 ml     Invasive Devices  Report    Peripheral Intravenous Line  Duration           Peripheral IV 08/10/22 Proximal;Right;Ventral (anterior) Forearm <1 day          Drain  Duration           Chest Tube 1 Right Pleural 28 Fr  3 days          Airway  Duration           Surgical Airway Shiley Fenestrated 161 days                Physical Exam:   Physical Exam  Vitals reviewed  Constitutional:       General: She is not in acute distress  Appearance: She is not ill-appearing or diaphoretic  Comments: Trach collar in place   HENT:      Head: Normocephalic and atraumatic        Right Ear: External ear normal       Left Ear: External ear normal       Mouth/Throat:      Mouth: Mucous membranes are moist    Eyes:      Conjunctiva/sclera: Conjunctivae normal    Cardiovascular:      Rate and Rhythm: Normal rate and regular rhythm  Heart sounds: Normal heart sounds  No murmur heard  Pulmonary:      Effort: Pulmonary effort is normal  No respiratory distress  Breath sounds: Normal breath sounds  Comments: Right chest tube removed  CT site clean and dry  Musculoskeletal:         General: No swelling, tenderness, deformity or signs of injury  Cervical back: Normal range of motion  Right lower leg: No edema  Left lower leg: No edema  Skin:     General: Skin is warm  Coloration: Skin is not pale  Findings: No erythema or rash  Neurological:      General: No focal deficit present  Mental Status: She is alert and oriented to person, place, and time  Psychiatric:         Behavior: Behavior normal          Labs:     CBC:  Results from last 7 days   Lab Units 08/10/22  0406 08/09/22  0840 08/08/22  0435 08/07/22  1856 08/07/22  1423 08/07/22  0311 08/06/22  1837 08/06/22  1220 08/06/22  0951 08/06/22  0415   WBC Thousand/uL 9 24 8 92 8 89 11 08*  --  9 57  --  14 60*  --  7 44   HEMOGLOBIN g/dL 9 7* 9 1* 8 4* 7 7* 8 2* 7 7* 8 5* 9 4*  --  9 2*   I STAT HEMOGLOBIN   --   --   --   --   --   --   --   --    < >  --    HEMATOCRIT % 30 5* 29 0* 26 1* 24 8* 26 9* 25 1* 27 8* 31 1*  --  31 0*   HEMATOCRIT, ISTAT   --   --   --   --   --   --   --   --    < >  --    PLATELETS Thousands/uL 514* 446* 373 387  --  423*  --  481*  --  573*   NEUTROS ABS Thousands/µL 6 78 6 79 6 31 8 71*  --   --   --   --   --  5 06    < > = values in this interval not displayed         CMP:  Results from last 7 days   Lab Units 08/10/22  0406 08/09/22  0840 08/08/22  0435 08/07/22  1856 08/07/22  0311 08/06/22  1220 08/06/22  1118 08/06/22  1058 08/06/22  0951 08/06/22  0951 08/06/22  0415   POTASSIUM mmol/L 4 0 4 8 4 4 4 4 5 0 5 5*  --   --   --   --  5 3   CHLORIDE mmol/L 100 103 105 102 104 106  --   --   --   --  101   CO2 mmol/L 31 29 29 28 27 27  --   --   --   --  28   CO2, I-STAT mmol/L  --   --   --   --   --   --  26 25   < > 30  --    BUN mg/dL 30* 32* 41* 43* 42* 38*  --   --   --   --  41*   CREATININE mg/dL 1 23 1 32* 1 34* 1 39* 1 32* 1 51*  --   --   --   --  1 68*   GLUCOSE, ISTAT mg/dl  --   --   --   --   --   --  220* 201*  --  224*  --    CALCIUM mg/dL 8 7 8 4 8 1* 7 7* 8 0* 7 7*  --   --   --   --  8 5   AST U/L  --   --   --  19  --   --   --   --   --   --   --    ALT U/L  --   --   --  11*  --   --   --   --   --   --   --    ALK PHOS U/L  --   --   --  56  --   --   --   --   --   --   --    EGFR ml/min/1 73sq m 49 45 44 42 45 38  --   --   --   --  33   MAGNESIUM mg/dL  --   --   --   --  2 1 1 6  --   --   --   --   --     < > = values in this interval not displayed  Sepsis:  Results from last 7 days   Lab Units 08/07/22  1856   LACTIC ACID mmol/L 0 7       Micro:  Lab Results   Component Value Date/Time    Blood Culture No Growth After 5 Days  05/11/2022 03:49 PM    Blood Culture No Growth After 5 Days  05/11/2022 03:30 PM    Sputum Culture 2+ Growth of Staphylococcus aureus (A) 07/26/2022 01:49 PM    Sputum Culture 1+ Growth of Pseudomonas aeruginosa (A) 07/26/2022 01:49 PM    Sputum Culture 1+ Growth of  07/26/2022 01:49 PM    Gram Stain Result Rare Polys 08/06/2022 10:11 AM    Gram Stain Result No bacteria seen 08/06/2022 10:11 AM    Urine Culture >100,000 cfu/ml Escherichia coli (A) 10/29/2021 03:44 PM    Wound Culture (A) 07/28/2022 12:01 PM     2+ Growth of Methicillin Resistant Staphylococcus aureus    Body Fluid Culture, Sterile No growth 08/06/2022 10:11 AM       Imaging:   XR chest portable    Result Date: 8/8/2022  Impression: 1   2 right chest tubes  Atelectasis at the right lung base  No pneumothorax  2   Increased density in the retrocardiac region may represent infiltrate, atelectasis and/or fluid   Workstation performed: [id number]     XR chest portable    Result Date: 8/3/2022  Impression: Improving right midlung field and bibasilar consolidation and effusion  Trace left pleural effusion persists  Stable position of right basilar pleural drainage catheter and tracheostomy  Workstation performed: [id number]     CT chest wo contrast    Result Date: 8/2/2022  Impression: 1  Decreased size of a right-sided pleural effusion and improved aeration of the right lower lobe post placement of a right pleural drainage catheter, with unchanged appearance of a loculated component of the effusion superomedially  There is slightly increased density of the effusion compatible with a small amount of blood products  2   Appearance of new inflammatory groundglass opacities in the left lower lobe  3   Mild background interstitial edema is unchanged  The study was marked in EPIC for significant notification  Workstation performed: [id number]     X-ray chest 1 view    Result Date: 8/6/2022  Impression: Revision of right-sided chest tubes  Cannot exclude small focus of loculated pneumothorax medial right base  No apical pneumothorax  Bibasilar partial volume loss and pleural effusions  Question asymmetric pulmonary venous congestion on the right   Workstation performed: [id number]       Medications:     Current Facility-Administered Medications   Medication Dose Route Frequency    acetaminophen (TYLENOL) tablet 975 mg  975 mg Oral Q6H    albuterol inhalation solution 2 5 mg  2 5 mg Nebulization Q4H PRN    amiodarone tablet 100 mg  100 mg Oral Daily With Breakfast    apixaban (ELIQUIS) tablet 5 mg  5 mg Oral BID    atorvastatin (LIPITOR) tablet 40 mg  40 mg Oral Daily With Dinner    benzonatate (TESSALON PERLES) capsule 100 mg  100 mg Oral TID PRN    bumetanide (BUMEX) tablet 2 mg  2 mg Oral Daily    diazepam (VALIUM) tablet 5 mg  5 mg Oral Q6H PRN    escitalopram (LEXAPRO) tablet 10 mg  10 mg Oral Daily    ferrous sulfate tablet 325 mg  325 mg Oral Daily With Breakfast    HYDROmorphone (DILAUDID) injection 0 5 mg  0 5 mg Intravenous Q2H PRN    HYDROmorphone (DILAUDID) tablet 2 mg  2 mg Oral Q4H PRN    HYDROmorphone (DILAUDID) tablet 4 mg  4 mg Oral Q4H PRN    hydrOXYzine HCL (ATARAX) tablet 25 mg  25 mg Oral Q6H PRN    insulin lispro (HumaLOG) 100 units/mL subcutaneous injection 10 Units  10 Units Subcutaneous TID With Meals    insulin lispro (HumaLOG) 100 units/mL subcutaneous injection 2-12 Units  2-12 Units Subcutaneous 4x Daily (AC & HS)    lidocaine (LIDODERM) 5 % patch 2 patch  2 patch Topical Daily    metoprolol succinate (TOPROL-XL) 24 hr tablet 50 mg  50 mg Oral Q12H    ondansetron (ZOFRAN) injection 4 mg  4 mg Intravenous Q6H PRN    pantoprazole (PROTONIX) EC tablet 40 mg  40 mg Oral Early Morning    polyethylene glycol (MIRALAX) packet 17 g  17 g Oral Daily    polyethylene glycol (MIRALAX) packet 17 g  17 g Oral Daily PRN    pregabalin (LYRICA) capsule 25 mg  25 mg Oral QPM    pregabalin (LYRICA) capsule 75 mg  75 mg Oral Daily    senna (SENOKOT) tablet 8 6 mg  1 tablet Oral Daily    senna-docusate sodium (SENOKOT S) 8 6-50 mg per tablet 1 tablet  1 tablet Oral BID    spironolactone (ALDACTONE) tablet 100 mg  100 mg Oral Daily    ticagrelor (BRILINTA) tablet 90 mg  90 mg Oral Q12H CEFERINO    trimethobenzamide (TIGAN) IM injection 200 mg  200 mg Intramuscular Q6H PRN    vancomycin (VANCOCIN) 1,250 mg in sodium chloride 0 9 % 250 mL IVPB  1,250 mg Intravenous Q24H       Patient was discussed with [facility] Attending on-call, Dr Karthikeyan Arreola MD  See attestation above        Cristino Luna MD  PGY-3 Family Medicine  08/10/22

## 2022-08-10 NOTE — PLAN OF CARE
Problem: OCCUPATIONAL THERAPY ADULT  Goal: Performs self-care activities at highest level of function for planned discharge setting  See evaluation for individualized goals  Description: Treatment Interventions: ADL retraining, Functional transfer training, UE strengthening/ROM, Endurance training, Patient/family training, Compensatory technique education, Continued evaluation, Energy conservation, Activityengagement          See flowsheet documentation for full assessment, interventions and recommendations  Outcome: Progressing  Note: Limitation: Decreased ADL status, Decreased endurance, Decreased self-care trans, Decreased high-level ADLs  Prognosis: Good  Assessment: Patient participated in Skilled OT session this date with interventions consisting of ADL re training with the use of correct body mechanics and  therapeutic activities to: increase activity tolerance   Patient agreeable to OT treatment session, upon arrival patient was found seated OOB to Chair  In comparison to previous session, patient with improvements in activity tolerance overall  Patient requiring frequent rest periods  Patient continues to be functioning below baseline level, occupational performance remains limited secondary to factors listed above and increased risk for falls and injury  From OT standpoint, recommendation at time of d/c would be Short Term Rehab  Patient to benefit from continued Occupational Therapy treatment while in the hospital to address deficits as defined above and maximize level of functional independence with ADLs and functional mobility       OT Discharge Recommendation: Post acute rehabilitation services

## 2022-08-10 NOTE — PROGRESS NOTES
Progress Note - Thoracic Surgery   Benjamín Mejias 65 y o  female MRN: [id number]  Unit/Bed#: [facility] 429-01 Encounter: [id number]    Assessment:  58yoF female p/w R empyema now POD4 s/p R VATS decortication and washout on 8/6, R apical CT removed on 8/9    Vitals stable, afebrile, on trach collar 4L  WBC 0 24 8 9, hemoglobin 9 7 9 1  Creatinine 1 23    R CT to WS, no air leak present, 75cc serosanguineous output in the last 24hr  UOP 2189    Plan:  - Reg diet, ensures  - R CT to WS, monitor for air leak, monitor output and character, will obtain PA/LAT CXR this AM  - IV zosyvandana, ID on board for abx recs  - Trach collar  - Eliquis/brilinta  - incentive spirometry  - pain control  - encourage ambulation  - care per primary    Subjective/Objective   Subjective:   Doing well, no acute events overnight, denies any pain, tolerating her diet without any nausea or emesis, remains on trach collar between 4 and 8 L  Objective:     Blood pressure 149/83, pulse 60, temperature 98 4 °F (36 9 °C), temperature source Oral, resp  rate 16, weight 81 kg (178 lb 9 2 oz), SpO2 97 %  ,Body mass index is 26 37 kg/m²        Intake/Output Summary (Last 24 hours) at 8/10/2022 7212  Last data filed at 8/10/2022 0400  Gross per 24 hour   Intake 360 ml   Output 2289 ml   Net -1929 ml       Invasive Devices  Report    Peripheral Intravenous Line  Duration           Peripheral IV 08/10/22 Proximal;Right;Ventral (anterior) Forearm <1 day          Drain  Duration           Chest Tube 1 Right Pleural 28 Fr  3 days          Airway  Duration           Surgical Airway Shiley Fenestrated 161 days                Physical Exam:  General: NAD  Skin: Warm, dry, anicteric  HEENT: Normocephalic, atraumatic  CV: RRR, no m/r/g  Pulm: CTA b/l, no inc WOB, trach collar, right chest tube as above  Abd: Soft, ND/NT  MSK: Symmetric, no edema, no tenderness, no deformity  Neuro: AOx3, GCS 15    Lab, Imaging and other studies:  I have personally reviewed pertinent lab results    , CBC:   Lab Results   Component Value Date    WBC 9 24 08/10/2022    HGB 9 7 (L) 08/10/2022    HCT 30 5 (L) 08/10/2022    MCV 82 08/10/2022     (H) 08/10/2022    MCH 26 1 (L) 08/10/2022    MCHC 31 8 08/10/2022    RDW 18 8 (H) 08/10/2022    MPV 9 5 08/10/2022    NRBC 0 08/10/2022   , CMP:   Lab Results   Component Value Date    SODIUM 136 08/10/2022    K 4 0 08/10/2022     08/10/2022    CO2 31 08/10/2022    BUN 30 (H) 08/10/2022    CREATININE 1 23 08/10/2022    CALCIUM 8 7 08/10/2022    EGFR 49 08/10/2022     VTE Pharmacologic Prophylaxis: Sequential compression device (Venodyne)  Eliquis/Brilinta  VTE Mechanical Prophylaxis: sequential compression device

## 2022-08-10 NOTE — RESPIRATORY THERAPY NOTE
08/10/22 3000   Respiratory Assessment   Assessment Type Assess only   General Appearance Alert; Awake   Respiratory Pattern Normal   Chest Assessment Chest expansion symmetrical   Bilateral Breath Sounds Clear;Diminished   Resp Comments No distress noted  no PRN UDN needed  Patient has na 6 0 mariana  All necessary supplies at bedside  No needs at thgius time   O2 Device tc   Surgical Airway Mariana Fenestrated   Placement Date: 03/02/22   Tube Size: 6 mm  Type: Tracheostomy  Brand: Mariana  Style: Fenestrated   Status Secured;Cuff Deflated   Site Assessment Dry;Clean   Ties Assessment Dry; Intact; Secure   Equipment at bedside BVM; Wall Suction setup; Additional complete same size trach tube; Additional complete one size smaller trach tube; Obturator;Sterile saline; Additional inner cannula   Additional Assessments   Pulse 57   Respirations 18   SpO2 96 %

## 2022-08-11 LAB
ANION GAP SERPL CALCULATED.3IONS-SCNC: 5 MMOL/L (ref 4–13)
BASOPHILS # BLD AUTO: 0.03 THOUSANDS/ΜL (ref 0–0.1)
BASOPHILS NFR BLD AUTO: 0 % (ref 0–1)
BUN SERPL-MCNC: 34 MG/DL (ref 5–25)
CALCIUM SERPL-MCNC: 8.8 MG/DL (ref 8.3–10.1)
CHLORIDE SERPL-SCNC: 102 MMOL/L (ref 96–108)
CO2 SERPL-SCNC: 29 MMOL/L (ref 21–32)
CREAT SERPL-MCNC: 1.44 MG/DL (ref 0.6–1.3)
EOSINOPHIL # BLD AUTO: 0.15 THOUSAND/ΜL (ref 0–0.61)
EOSINOPHIL NFR BLD AUTO: 2 % (ref 0–6)
ERYTHROCYTE [DISTWIDTH] IN BLOOD BY AUTOMATED COUNT: 18.8 % (ref 11.6–15.1)
GFR SERPL CREATININE-BSD FRML MDRD: 40 ML/MIN/1.73SQ M
GLUCOSE SERPL-MCNC: 146 MG/DL (ref 65–140)
GLUCOSE SERPL-MCNC: 153 MG/DL (ref 65–140)
GLUCOSE SERPL-MCNC: 154 MG/DL (ref 65–140)
GLUCOSE SERPL-MCNC: 166 MG/DL (ref 65–140)
GLUCOSE SERPL-MCNC: 173 MG/DL (ref 65–140)
HCT VFR BLD AUTO: 34.9 % (ref 34.8–46.1)
HGB BLD-MCNC: 10.2 G/DL (ref 11.5–15.4)
IMM GRANULOCYTES # BLD AUTO: 0.1 THOUSAND/UL (ref 0–0.2)
IMM GRANULOCYTES NFR BLD AUTO: 1 % (ref 0–2)
LYMPHOCYTES # BLD AUTO: 1.07 THOUSANDS/ΜL (ref 0.6–4.47)
LYMPHOCYTES NFR BLD AUTO: 14 % (ref 14–44)
MCH RBC QN AUTO: 25.6 PG (ref 26.8–34.3)
MCHC RBC AUTO-ENTMCNC: 29.2 G/DL (ref 31.4–37.4)
MCV RBC AUTO: 88 FL (ref 82–98)
MONOCYTES # BLD AUTO: 0.73 THOUSAND/ΜL (ref 0.17–1.22)
MONOCYTES NFR BLD AUTO: 9 % (ref 4–12)
NEUTROPHILS # BLD AUTO: 5.86 THOUSANDS/ΜL (ref 1.85–7.62)
NEUTS SEG NFR BLD AUTO: 74 % (ref 43–75)
NRBC BLD AUTO-RTO: 0 /100 WBCS
PLATELET # BLD AUTO: 484 THOUSANDS/UL (ref 149–390)
PMV BLD AUTO: 9.5 FL (ref 8.9–12.7)
POTASSIUM SERPL-SCNC: 4.4 MMOL/L (ref 3.5–5.3)
RBC # BLD AUTO: 3.99 MILLION/UL (ref 3.81–5.12)
SODIUM SERPL-SCNC: 136 MMOL/L (ref 135–147)
WBC # BLD AUTO: 7.94 THOUSAND/UL (ref 4.31–10.16)

## 2022-08-11 PROCEDURE — 85025 COMPLETE CBC W/AUTO DIFF WBC: CPT | Performed by: FAMILY MEDICINE

## 2022-08-11 PROCEDURE — 02HV33Z INSERTION OF INFUSION DEVICE INTO SUPERIOR VENA CAVA, PERCUTANEOUS APPROACH: ICD-10-PCS | Performed by: FAMILY MEDICINE

## 2022-08-11 PROCEDURE — 80048 BASIC METABOLIC PNL TOTAL CA: CPT | Performed by: FAMILY MEDICINE

## 2022-08-11 PROCEDURE — 94760 N-INVAS EAR/PLS OXIMETRY 1: CPT

## 2022-08-11 PROCEDURE — 82948 REAGENT STRIP/BLOOD GLUCOSE: CPT

## 2022-08-11 PROCEDURE — 36569 INSJ PICC 5 YR+ W/O IMAGING: CPT

## 2022-08-11 PROCEDURE — 97530 THERAPEUTIC ACTIVITIES: CPT

## 2022-08-11 PROCEDURE — C1751 CATH, INF, PER/CENT/MIDLINE: HCPCS

## 2022-08-11 PROCEDURE — 99232 SBSQ HOSP IP/OBS MODERATE 35: CPT | Performed by: INTERNAL MEDICINE

## 2022-08-11 PROCEDURE — 99231 SBSQ HOSP IP/OBS SF/LOW 25: CPT | Performed by: FAMILY MEDICINE

## 2022-08-11 RX ORDER — LANOLIN ALCOHOL/MO/W.PET/CERES
CREAM (GRAM) TOPICAL 2 TIMES DAILY
Status: DISCONTINUED | OUTPATIENT
Start: 2022-08-11 | End: 2022-08-18 | Stop reason: HOSPADM

## 2022-08-11 RX ADMIN — APIXABAN 5 MG: 5 TABLET, FILM COATED ORAL at 18:08

## 2022-08-11 RX ADMIN — VANCOMYCIN HYDROCHLORIDE 1000 MG: 1 INJECTION, SOLUTION INTRAVENOUS at 14:27

## 2022-08-11 RX ADMIN — TICAGRELOR 90 MG: 90 TABLET ORAL at 21:09

## 2022-08-11 RX ADMIN — INSULIN LISPRO 2 UNITS: 100 INJECTION, SOLUTION INTRAVENOUS; SUBCUTANEOUS at 21:10

## 2022-08-11 RX ADMIN — Medication: at 18:10

## 2022-08-11 RX ADMIN — DIAZEPAM 5 MG: 5 TABLET ORAL at 11:38

## 2022-08-11 RX ADMIN — BENZONATATE 100 MG: 100 CAPSULE ORAL at 14:25

## 2022-08-11 RX ADMIN — PANTOPRAZOLE SODIUM 40 MG: 40 TABLET, DELAYED RELEASE ORAL at 05:41

## 2022-08-11 RX ADMIN — INSULIN LISPRO 10 UNITS: 100 INJECTION, SOLUTION INTRAVENOUS; SUBCUTANEOUS at 08:34

## 2022-08-11 RX ADMIN — HYDROMORPHONE HYDROCHLORIDE 2 MG: 2 TABLET ORAL at 05:41

## 2022-08-11 RX ADMIN — ATORVASTATIN CALCIUM 40 MG: 40 TABLET, FILM COATED ORAL at 18:08

## 2022-08-11 RX ADMIN — PREGABALIN 75 MG: 75 CAPSULE ORAL at 08:31

## 2022-08-11 RX ADMIN — HYDROMORPHONE HYDROCHLORIDE 2 MG: 2 TABLET ORAL at 21:09

## 2022-08-11 RX ADMIN — Medication: at 13:28

## 2022-08-11 RX ADMIN — BUMETANIDE 2 MG: 2 TABLET ORAL at 08:31

## 2022-08-11 RX ADMIN — INSULIN LISPRO 2 UNITS: 100 INJECTION, SOLUTION INTRAVENOUS; SUBCUTANEOUS at 11:33

## 2022-08-11 RX ADMIN — SPIRONOLACTONE 100 MG: 50 TABLET ORAL at 08:30

## 2022-08-11 RX ADMIN — AMIODARONE HYDROCHLORIDE 100 MG: 200 TABLET ORAL at 08:31

## 2022-08-11 RX ADMIN — PREGABALIN 25 MG: 25 CAPSULE ORAL at 18:08

## 2022-08-11 RX ADMIN — APIXABAN 5 MG: 5 TABLET, FILM COATED ORAL at 08:30

## 2022-08-11 RX ADMIN — TICAGRELOR 90 MG: 90 TABLET ORAL at 08:32

## 2022-08-11 RX ADMIN — INSULIN LISPRO 10 UNITS: 100 INJECTION, SOLUTION INTRAVENOUS; SUBCUTANEOUS at 18:04

## 2022-08-11 RX ADMIN — INSULIN LISPRO 2 UNITS: 100 INJECTION, SOLUTION INTRAVENOUS; SUBCUTANEOUS at 05:42

## 2022-08-11 RX ADMIN — ESCITALOPRAM OXALATE 10 MG: 10 TABLET ORAL at 08:31

## 2022-08-11 RX ADMIN — METOPROLOL SUCCINATE 50 MG: 50 TABLET, EXTENDED RELEASE ORAL at 05:41

## 2022-08-11 RX ADMIN — METOPROLOL SUCCINATE 50 MG: 50 TABLET, EXTENDED RELEASE ORAL at 18:08

## 2022-08-11 RX ADMIN — LIDOCAINE 5% 1 PATCH: 700 PATCH TOPICAL at 08:30

## 2022-08-11 RX ADMIN — INSULIN LISPRO 10 UNITS: 100 INJECTION, SOLUTION INTRAVENOUS; SUBCUTANEOUS at 11:33

## 2022-08-11 RX ADMIN — MIRTAZAPINE 7.5 MG: 15 TABLET, FILM COATED ORAL at 21:09

## 2022-08-11 NOTE — ASSESSMENT & PLAN NOTE
· STEMI 10/22/2021 s/p PATRICA mid circumflex OM1  OM2 was ballooned but not stented    · Pulseless VT 10/27/21 - repeat LHC 90% mid circumflex stenosis, but could not be intervened on  · VT 10/28/21 LHC:  found to have apical LAD complete occlusion was felt to be small to be intervened    · Cardiac carrest 1/1/22 - [facility] felt to be hypoxic vs  VT induced  · On metoprolol 50mg BID, Brilinta 90 mg BID and Lipitor 40 mg qd

## 2022-08-11 NOTE — PROGRESS NOTES
Progress Note - Infectious Disease   Kelli Red 65 y o  female MRN: [id number]  Unit/Bed#: [facility] 429-01 Encounter: [id number]      Impression/Recommendations:  1  Probable right-sided empyema   CT show loculated right pleural effusion   Patient is status post placement of chest tube on 07/29, with pleural fluid parameters consistent with empyema   Culture has no growth, likely due to prior antibiotic   Given MRSA in right chest wound, MRSA is likely pathogen in empyema   Despite chest tube drainage, repeat chest CT on 08/02 showed persistent and unchanged loculated empyema   Patient is now status post VATS/decortication   Operative culture with no growth, likely due to prolonged antibiotic prior to surgery  Chest tube was removed  Continue IV vancomycin  Monitor temperature/WBC  Follow-up final operative culture  Treat x 2 weeks after VATS, through 8/20      2  Developing sepsis, with leukocytosis and tachypnea, likely secondary to empyema above   Patient is clinically and systemically improved   WBC has normalized   Tachypnea resolved  Frank Simple Antibiotic plan as in below  Monitor temperature/WBC  Monitor hemodynamics      3  MRSA right chest wall infection, as site recent/prior chest tube placement for spontaneous pneumothorax  Antibiotic plan as in above  Serial exams      4  Acute on chronic hypoxic respiratory failure, likely multifactorial   Respiratory culture with MRSA and Pseudomonas but no obvious pneumonia   These isolates are most likely airway colonization  Management per primary service      5  Recent spontaneous pneumothorax, status post chest tube placement on 07/20   This was removed on 07/24      6  DM, type 2, poorly controlled, with hyperglycemia and elevated hemoglobin A1c   This is risk factor for infection above  Management per primary service      7  MODESTO  Creatinine up and down  Antibiotic dosages adjusted accordingly    Monitor creatinine      Discussed with patient in detail regarding the above plan      Antibiotics:  Vancomycin # 16  POD # 5     Subjective:  Patient's right-sided chest pain is much improved with chest tube out  Temperature stays down   No chills  She is tolerating antibiotic well   No nausea, vomiting or diarrhea      Objective:  Vitals:  Temp:  [97 5 °F (36 4 °C)-98 3 °F (36 8 °C)] 98 3 °F (36 8 °C)  HR:  [60-64] 63  Resp:  [16-19] 19  BP: (111-155)/(71-92) 128/76  SpO2:  [94 %-99 %] 94 %  Temp (24hrs), Av °F (36 7 °C), Min:97 5 °F (36 4 °C), Max:98 3 °F (36 8 °C)  Current: Temperature: 98 3 °F (36 8 °C)    Physical Exam:     General: Awake, alert, cooperative, no distress  Neck:  Supple  No mass  No lymphadenopathy  Lungs: Decreased breath sounds on right, sparse right basilar rales, no wheezing, respirations unlabored  Heart:  Regular rate and rhythm, S1 and S2 normal, no murmur  Abdomen: Soft, nondistended, non-tender, bowel sounds active all four quadrants, no masses, no organomegaly  Extremities: No edema  No erythema/warmth  No ulcer  Nontender to palpation  Skin:  No rash  Neuro: Moves all extremities  Invasive Devices  Report    Peripherally Inserted Central Catheter Line  Duration           PICC Line 22 Right Brachial <1 day          Peripheral Intravenous Line  Duration           Peripheral IV 08/10/22 Proximal;Right;Ventral (anterior) Forearm 1 day          Airway  Duration           Surgical Airway Shiley Fenestrated 162 days                Labs studies:   I have personally reviewed pertinent labs    Results from last 7 days   Lab Units 22  0523 08/10/22  0406 22  0840 22  0435 22  1856 22  1220 22  1118 22  1058 22  0951   POTASSIUM mmol/L 4 4 4 0 4 8   < > 4 4   < >  --   --   --    CHLORIDE mmol/L 102 100 103   < > 102   < >  --   --   --    CO2 mmol/L 29 31 29   < > 28   < >  --   --   --    CO2, I-STAT mmol/L  --   --   --   --   --   --  26 25 30   BUN mg/dL 34* 30* 32*   < > 43*   < >  --   --   --    CREATININE mg/dL 1 44* 1 23 1 32*   < > 1 39*   < >  --   --   --    EGFR ml/min/1 73sq m 40 49 45   < > 42   < >  --   --   --    GLUCOSE, ISTAT mg/dl  --   --   --   --   --   --  220* 201* 224*   CALCIUM mg/dL 8 8 8 7 8 4   < > 7 7*   < >  --   --   --    AST U/L  --   --   --   --  19  --   --   --   --    ALT U/L  --   --   --   --  11*  --   --   --   --    ALK PHOS U/L  --   --   --   --  56  --   --   --   --     < > = values in this interval not displayed  Results from last 7 days   Lab Units 08/11/22  0523 08/10/22  0406 08/09/22  0840   WBC Thousand/uL 7 94 9 24 8 92   HEMOGLOBIN g/dL 10 2* 9 7* 9 1*   PLATELETS Thousands/uL 484* 514* 446*     Results from last 7 days   Lab Units 08/06/22  1011 08/06/22  1007   GRAM STAIN RESULT  Rare Polys  No bacteria seen 1+ Polys  No organisms seen   BODY FLUID CULTURE, STERILE  No growth  --        Imaging Studies:   I have personally reviewed pertinent imaging study reports and images in PACS  EKG, Pathology, and Other Studies:   I have personally reviewed pertinent reports

## 2022-08-11 NOTE — ARC ADMISSION
Updates reviewed with ARC physician - patient is approved for [facility] admission pending insurance authorization and bed availability  Auth process has been initiated and we await their determination at this time  CM has been updated  Will continue to follow and update as able

## 2022-08-11 NOTE — PLAN OF CARE
Problem: PHYSICAL THERAPY ADULT  Goal: Performs mobility at highest level of function for planned discharge setting  See evaluation for individualized goals  Description: Treatment/Interventions: Functional transfer training, LE strengthening/ROM, Therapeutic exercise, Endurance training, Equipment eval/education, Bed mobility, Gait training, Spoke to nursing  Equipment Recommended: Shagufta Asif       See flowsheet documentation for full assessment, interventions and recommendations  8/11/2022 1550 by Jorge Carney  Outcome: Progressing  Note: Prognosis: Fair  Problem List: Decreased strength, Decreased endurance, Decreased mobility, Obesity  Assessment: Pt was seen to progress towards goals of ambulation, LE strengthening, and transfers  Throughout the session, the pt had noticeable fatigue  Pt had noticeable CONNOLLY during increased activity that took ~5 minutes to subside with SPO2 sats that remained at 100%  At the EOB, the pt had a flexed posture that increased with fatigue  During standing and ambulation, the pt had a flexed posture with increased reliance on the RW  She was able to ambulated only for a short distance d/t fatigue and episodes of dry cough  Pt can continue to be seen for skilled PT throughout her hospital stay to progress toward her goals  PT recommendation upon d/c is post acute rehab when medically cleared  Barriers to Discharge: Inaccessible home environment     PT Discharge Recommendation: Post acute rehabilitation services    See flowsheet documentation for full assessment

## 2022-08-11 NOTE — CASE MANAGEMENT
Case Management Progress Note    Patient name Santa Levy  Location [facility] MRN [id number]  : 1965 Date 2022       LOS (days): 5  Geometric Mean LOS (GMLOS) (days): 9 20  Days to GMLOS:0 5        Pt is recommended for acute rehab placement for her aftercare plan  CM spoke to pt about this aftercare recommendation  Pt is agreeable w/ this recommendation  CM provided pt w/ freedom of choice for acute rehab providers  Pt requested referral to [facility] CM made AIDIN referral to [facility] CM to follow

## 2022-08-11 NOTE — ASSESSMENT & PLAN NOTE
· Trach placed in the context of cardiac arrest/prolonged ventilator dependent state November 2021  · Decannulated at [facility] 12/11/21  · Patient requires frequent tracheostomy changes and suctioning  · Per discussion with speech therapy,  video barium swallow was done for sense of food getting stuck   Appropriate for regular diet  · On trach collar O2 with humidification and tolerating well btw 6-8L

## 2022-08-11 NOTE — PROGRESS NOTES
[address] [facility]  Progress Note - Melinda Arana 1965, 65 y o  female MRN: [id number]  Unit/Bed#: [facility] 429-01 Encounter: [id number]  Primary Care Provider: Torres Johnson MD   Date and time admitted to hospital: 8/2/2022  8:31 PM    * Empyema lung, Right  Assessment & Plan  · Recurrent loculated right pleural effusion  S/P right-sided chest tube insertion and removal on 07/24  · CT of the chest on 7/28/22 revealed moderate partially loculated right pleural effusion  · Right chest tube re-placed on 7/29/22 by IR  · Pleural fluid analysis shows neutrophil predominant WBC count  · Fluid cultures from prior anterior chest tube site positive for MRSA  · 8/1 - CXR: Small component of right pleural effusion suspected which may be partially loculated  Indwelling right thoracostomy tube without pneumothorax  · 8/2 CT Chest:  Decreased size of a right-sided pleural effusion and improved aeration of the right lower lobe post placement of a right pleural drainage catheter, with unchanged appearance of a loculated component of the effusion superomedially  There is slightly  increased density of the effusion compatible with a small amount of blood products  2   Appearance of new inflammatory groundglass opacities in the left lower lobe  3   Mild background interstitial edema is unchanged  · 8/6 - S/P VATS Decortation  procedure  · 8/9 - Apical chest tube removed by thoracic surgery   · 8/11 - patient medically cleared for discharge to [facility]  Awaiting insurance authorization     · 8/11- PICC line placed   Plan:  - Continue Abx w/ Vanc given MRSA, Vanc will need to be continued through 8/16 per ID  - Maintain CT to suction and monitor output  - Aggressive pulmonary toilet  - Monitor fever curve and white count closely - patient has remained afebrile with stable white count    Acute on chronic heart failure with preserved ejection fraction (HCC)  Assessment & Plan  Wt Readings from Last 3 Encounters:   08/11/22 80 4 kg (177 lb 4 oz)   08/02/22 77 7 kg (171 lb 6 4 oz)   07/19/22 78 2 kg (172 lb 6 4 oz)     · [facility] on [facility] with CHF exacerbation, now optimized  · Most recent EF 50% with normal systolic and diastolic function on 0/81/1224  Currently stable and euvolemic  · On Bumex 2 mg daily with Aldactone 100 mg daily - restarted on 7/29  · Continue low-salt diet, strict I's and O's monitoring  · Per Cardiology at [facility] upon discharge  · Will need follow-up with General Cardiology & EP service upon discharge for ICD consideration    Chest wall wound infection  Assessment & Plan  · Purulent discharge noted at site of recent chest tube placement  · Cultures positive for MRSA on 07/28 susceptible to vanc  · Currently on IV vancomycin - will continue thru 8/16 per ID  · Continue local wound care/dressing changes     Pain at Chest tube insertion site  Assessment & Plan  · Continues with intractable pain at prior chest tube site on right anterior chest wall  This was removed 7/24/22  · Was followed by APS back in [facility], S/P epidural catheter which was removed 8/2  · Current pain regimen: Diaz Tylenol 975 Q8h, PO Dilaudid 2/4 for mod/severe, IV Dilaudid 0 5 mg Q2  · Bowel regimen:  Scheduled senna and MiraLax daily  · Both chest tubes now removed - last chest tube removed on 8/10 by CTS     Acute Respiratory insufficiency on chronic hypoxic respiratory failure  Assessment & Plan  · She is status post trach   Currently on 6-8L with sats in the high 90s, at home uses 10 L  · Status post recent right pneumothorax requiring chest tube and now with right empyema  · Continue aggressive respiratory protocol, p r n  suctioning  · Continue Xoponex and Atrovent per Pulm q6hrs  · Encourage out of bed, incentive spirometry  · Pulmonology following      CKD (chronic kidney disease) stage 3, GFR 30-59 ml/min ([facility])  Assessment & Plan  · MODESTO noted on initial admission to [facility] with elevated Cr 1 64, now resolved  · Baseline Cr being 0 9-1 0  · Continue to monitor closely  Losartan on hold  · Avoid nephrotoxic agents     Anemia  Assessment & Plan    Results from last 7 days   Lab Units 08/11/22  0523 08/10/22  0406 08/09/22  0840 08/08/22  0435 08/07/22  1856 08/07/22  1423 08/07/22  0311   HEMOGLOBIN g/dL 10 2* 9 7* 9 1* 8 4* 7 7* 8 2* 7 7*   HEMATOCRIT % 34 9 30 5* 29 0* 26 1* 24 8* 26 9* 25 1*     Most likely anemia of chronic disease due to prolonged illness  No active bleeding noted  Hg (8/4): 6 8 s/p  5 unit PRBC's since this admission  Hb stable, monitor closely  Consider transfusing if < 8 due to history of CAD  Continue Iron replacement as well as daily miralax     Tracheostomy in place [facility])  Assessment & Plan  · Trach placed in the context of cardiac arrest/prolonged ventilator dependent state November 2021  · Decannulated at [facility] 12/11/21  · Patient requires frequent tracheostomy changes and suctioning  · Per discussion with speech therapy,  video barium swallow was done for sense of food getting stuck  Appropriate for regular diet  · On trach collar O2 with humidification and tolerating well btw 6-8L     CAD (coronary artery disease)  Assessment & Plan  · STEMI 10/22/2021 s/p PATRICA mid circumflex OM1  OM2 was ballooned but not stented    · Pulseless VT 10/27/21 - repeat LHC 90% mid circumflex stenosis, but could not be intervened on  · VT 10/28/21 LHC:  found to have apical LAD complete occlusion was felt to be small to be intervened    · Cardiac carrest 1/1/22 - NO cardiac cath at [facility] felt to be hypoxic vs  VT induced  · On metoprolol 50mg BID, Brilinta 90 mg BID and Lipitor 40 mg qd     A-fib [facility])  Assessment & Plan  Follows closely with Cardiology  Rhythm controlled with amiodarone 100 mg daily and AC with Eliquis 5 mg BID    Type 2 diabetes mellitus with hyperglycemia [facility])  Assessment & Plan  Lab Results   Component Value Date    HGBA1C 12 7 (H) 05/22/2022       Recent Labs     08/10/22  1559 08/10/22  2107 08/11/22  0537 08/11/22  1038   POCGLU 170* 216* 173* 166*     Uncontrolled per most recent A1c but gradually improving glycemic control  Home regimen: Lantus 16U QHS and Novolog 4U TID  Previous inpatient regimen: Lantus 30U qHS + Humalog 20U TID + SSI 4  Lantus now d/c'd and Humalog changed to 10U TID  Will continue for now and adjust as needed to maintain  - 180    Hypertension  Assessment & Plan  BP stable  On home losartan 50 mg BID, follows closely with Cardiology  Losartan on hold but can consider restarting as MODESTO has now resolved  Monitor BP closely     Hyperlipidemia associated with type 2 diabetes mellitus (HCC)  Assessment & Plan  - Continue Atorvastatin 40 mg daily  Diabetic peripheral neuropathy (HCC)  Assessment & Plan  Continue PTA Lyrica as documented     Anxiety  Assessment & Plan  On Lexapro 10 mg daily as well as Atarax prn             Subjective/Objective     Subjective:   Patient seen and examined at bedside  No overnight events  Patient is stable, denies any new complains or concerns  Objective:  Vitals: Blood pressure 122/71, pulse 63, temperature 98 2 °F (36 8 °C), temperature source Oral, resp  rate 19, weight 80 4 kg (177 lb 4 oz), SpO2 94 %  ,Body mass index is 26 18 kg/m²  Intake/Output Summary (Last 24 hours) at 8/11/2022 1401  Last data filed at 8/11/2022 1331  Gross per 24 hour   Intake 480 ml   Output 899 ml   Net -419 ml       Invasive Devices  Report    Peripherally Inserted Central Catheter Line  Duration           PICC Line 08/11/22 Right Brachial <1 day          Peripheral Intravenous Line  Duration           Peripheral IV 08/10/22 Proximal;Right;Ventral (anterior) Forearm 1 day          Airway  Duration           Surgical Airway Shiley Fenestrated 162 days              Physical Exam  Vitals reviewed  Constitutional:       General: She is not in acute distress       Appearance: She is not ill-appearing or diaphoretic  Comments: Trach collar in place   HENT:      Head: Normocephalic and atraumatic  Right Ear: External ear normal       Left Ear: External ear normal       Mouth/Throat:      Mouth: Mucous membranes are moist    Eyes:      Conjunctiva/sclera: Conjunctivae normal    Cardiovascular:      Rate and Rhythm: Normal rate and regular rhythm  Heart sounds: Normal heart sounds  No murmur heard  Pulmonary:      Effort: Pulmonary effort is normal  No respiratory distress  Breath sounds: Normal breath sounds  Comments: Right chest tube removed  CT site clean and dry  Musculoskeletal:         General: No swelling, tenderness, deformity or signs of injury  Cervical back: Normal range of motion  Right lower leg: No edema  Left lower leg: No edema  Skin:     General: Skin is warm  Coloration: Skin is not pale  Findings: No erythema or rash  Neurological:      General: No focal deficit present  Mental Status: She is alert and oriented to person, place, and time  Psychiatric:         Behavior: Behavior normal       Lab, Imaging and other studies: I have personally reviewed pertinent reports       Results from last 7 days   Lab Units 08/11/22  0523 08/10/22  0406 08/09/22  0840   WBC Thousand/uL 7 94 9 24 8 92   HEMOGLOBIN g/dL 10 2* 9 7* 9 1*   HEMATOCRIT % 34 9 30 5* 29 0*   PLATELETS Thousands/uL 484* 514* 446*   NEUTROS PCT % 74 74 76*   MONOS PCT % 9 10 9     Results from last 7 days   Lab Units 08/11/22  0523 08/10/22  0406 08/09/22  0840 08/08/22  0435 08/07/22  1856 08/06/22  1220 08/06/22  1118 08/06/22  1058 08/06/22  0951   POTASSIUM mmol/L 4 4 4 0 4 8   < > 4 4   < >  --   --   --    CHLORIDE mmol/L 102 100 103   < > 102   < >  --   --   --    CO2 mmol/L 29 31 29   < > 28   < >  --   --   --    CO2, I-STAT mmol/L  --   --   --   --   --   --  26 25 30   BUN mg/dL 34* 30* 32*   < > 43*   < >  --   --   --    CREATININE mg/dL 1 44* 1 23 1 32*   < > 1 39*   < >  --   --   --    CALCIUM mg/dL 8 8 8 7 8 4   < > 7 7*   < >  --   --   --    ALK PHOS U/L  --   --   --   --  56  --   --   --   --    ALT U/L  --   --   --   --  11*  --   --   --   --    AST U/L  --   --   --   --  19  --   --   --   --    GLUCOSE, ISTAT mg/dl  --   --   --   --   --   --  220* 201* 224*    < > = values in this interval not displayed  Results from last 7 days   Lab Units 08/07/22  0311 08/06/22  1220   MAGNESIUM mg/dL 2 1 1 6     Lab Results   Component Value Date    PHOS 4 6 (H) 07/18/2022    PHOS 3 9 07/17/2022    PHOS 4 2 07/16/2022      Results from last 7 days   Lab Units 08/06/22  0415 08/05/22 2029 08/05/22  1039 08/05/22  0147 08/04/22  1442   INR   --   --   --   --  1 28*   PTT seconds 37 82* 43*   < > 33    < > = values in this interval not displayed       Results from last 7 days   Lab Units 08/07/22  1856   LACTIC ACID mmol/L 0 7     0   Lab Value Date/Time    TROPONINI >40 00 (H) 10/24/2021 0839    TROPONINI >40 00 (H) 10/22/2021 1851    TROPONINI >40 00 (H) 10/22/2021 1442    TROPONINI <0 03 08/26/2021 0802    TROPONINI <0 03 04/18/2021 1413    TROPONINI <0 02 11/17/2020 1537    TROPONINI <0 03 07/26/2020 1821    TROPONINI <0 02 06/02/2020 2250    TROPONINI <0 02 06/02/2020 1952    TROPONINI <0 02 03/04/2019 2122    TROPONINI <0 02 10/07/2018 1516    TROPONINI <0 02 08/12/2018 0042    TROPONINI <0 02 08/11/2018 2058    TROPONINI <0 02 08/11/2018 1543    TROPONINI <0 02 08/10/2018 1912     ABG:  Lab Results   Component Value Date    PHART 7 378 08/07/2022    HYT2FTE 46 0 (H) 08/07/2022    PO2ART 70 3 (L) 08/07/2022    YBW9BJP 26 5 08/07/2022    BEART 1 1 08/07/2022    SOURCE Line, Arterial 08/07/2022     VTE Pharmacologic Prophylaxis: Reason for no pharmacologic prophylaxis Eliquis     VTE Mechanical Prophylaxis: sequential compression device

## 2022-08-11 NOTE — PROGRESS NOTES
Vancomycin IV Pharmacy-to-Dose Consultation    Noble Aschoff is a 65 y o  female who is currently receiving Vancomycin IV with management by the Pharmacy Consult service  Assessment/Plan:  The patient was reviewed  Renal function is stable and no signs or symptoms of nephrotoxicity and/or infusion reactions were documented in the chart  Vancomycin level was supratherapeutic  Based on todays assessment, continue vancomycin (day # 11) dosing of 1000mg q 24h starting 8/11, with a plan for trough to be drawn at 1330 on 8/13  Ped ID note, treat until 8/20  We will continue to follow the patients culture results and clinical progress daily  Clarke LAIRD  Ph  Pharmacist

## 2022-08-11 NOTE — ASSESSMENT & PLAN NOTE
Wt Readings from Last 3 Encounters:   08/11/22 80 4 kg (177 lb 4 oz)   08/02/22 77 7 kg (171 lb 6 4 oz)   07/19/22 78 2 kg (172 lb 6 4 oz)     · Initially admitted to [facility] on 07/15 with CHF exacerbation, now optimized  · Most recent EF 50% with normal systolic and diastolic function on 9/47/7249  Currently stable and euvolemic  · On Bumex 2 mg daily with Aldactone 100 mg daily - restarted on 7/29  · Continue low-salt diet, strict I's and O's monitoring  · Per Cardiology at [facility] upon discharge    · Will need follow-up with General Cardiology & EP service upon discharge for ICD consideration

## 2022-08-11 NOTE — PROCEDURES
Insert PICC line    Date/Time: 8/11/2022 9:05 AM  Performed by: Estelle Byoer RN  Authorized by: Shantel Kinney MD     Patient location:  Bedside  Other Assisting Provider: Yes (comment) (Stacey Chawla)    Consent:     Consent obtained:  Written (obtained byMD)    Consent given by:  Patient    Procedural risks discussed: reviewed by MD Cummings protocol:     Procedure explained and questions answered to patient or proxy's satisfaction: yes      Relevant documents present and verified: yes      Test results available and properly labeled: yes      Radiology Images displayed and confirmed  If images not available, report reviewed: yes      Required blood products, implants, devices, and special equipment available: yes      Site/side marked: yes      Immediately prior to procedure, a time out was called: yes      Patient identity confirmed:  Verbally with patient, arm band and hospital-assigned identification number  Pre-procedure details:     Hand hygiene: Hand hygiene performed prior to insertion      Sterile barrier technique: All elements of maximal sterile technique followed      Skin preparation:  ChloraPrep    Skin preparation agent: Skin preparation agent completely dried prior to procedure    Indications:     PICC line indications: long term antibiotics    Anesthesia (see MAR for exact dosages):      Anesthesia method:  Local infiltration    Local anesthetic:  Lidocaine 1% w/o epi (3mL)  Procedure details:     Location:  Brachial    Vessel type: vein      Laterality:  Right    Approach: percutaneous technique used      Patient position:  Flat    Procedural supplies:  Double lumen    Catheter size:  5 Fr    Landmarks identified: yes      Ultrasound guidance: yes      Ultrasound image availability:  Images available in PACS    Sterile ultrasound techniques: Sterile gel and sterile probe covers were used      Number of attempts:  1    Successful placement: yes      Vessel of catheter tip end:  Burton 3CG confirmed    Total catheter length (cm):  40    Catheter out on skin (cm):  0    Max flow rate:  999    Arm circumference:  28  Post-procedure details:     Post-procedure:  Dressing applied and securement device placed    Assessment:  Blood return through all ports and free fluid flow    Post-procedure complications: none      Patient tolerance of procedure:   Tolerated well, no immediate complications

## 2022-08-11 NOTE — PHYSICAL THERAPY NOTE
Physical Therapy Treatment    Patient's Name: Belén Ortiz       08/11/22 1427   PT Last Visit   PT Visit Date 08/11/22   Note Type   Note Type Treatment   Pain Assessment   Pain Assessment Tool [facility] Pain Intervention(s) Repositioned; Ambulation/increased activity   Pain Rating: FLACC (Rest) - Face 1   Pain Rating: FLACC (Rest) - Legs 0   Pain Rating: FLACC (Rest) - Activity 0   Pain Rating: FLACC (Rest) - Cry 0   Pain Rating: FLACC (Rest) - Consolability 0   Score: FLACC (Rest) 1   Pain Rating: FLACC (Activity) - Face 1   Pain Rating: FLACC (Activity) - Legs 1   Pain Rating: FLACC (Activity) - Activity 1   Pain Rating: FLACC (Activity) - Cry 1   Pain Rating: FLACC (Activity) - Consolability 1   Score: FLACC (Activity) 5   Restrictions/Precautions   Weight Bearing Precautions Per Order No   Braces or Orthoses   (none)   Other Precautions Telemetry; Bed Alarm;Multiple lines  (trach collar)   General   Chart Reviewed Yes   Response to Previous Treatment Patient with no complaints from previous session  Family/Caregiver Present No   Cognition   Overall Cognitive Status WFL   Arousal/Participation Alert; Cooperative   Attention Attends with cues to redirect   Orientation Level Oriented X4   Memory Decreased recall of recent events;Decreased recall of precautions   Following Commands Follows one step commands with increased time or repetition   Comments Pt presents with flat affect, but able to communicate with hand gestures and mouthing words   Subjective   Subjective Pt resting in bed, but agreeable to session   Bed Mobility   Supine to Sit 4  Minimal assistance   Additional items Assist x 1; Increased time required;Verbal cues   Sit to Supine 4  Minimal assistance   Additional items Assist x 1;LE management; Increased time required;Verbal cues   Transfers   Sit to Stand 4  Minimal assistance   Additional items Assist x 1   Stand to Sit 4  Minimal assistance   Additional items Assist x 1; Increased time required   Additional Comments Transfers performed with RW   Ambulation/Elevation   Gait pattern Wide KATERINE; Forward Flexion; Excessively slow; Short stride   Gait Assistance 4  Minimal assist   Additional items Assist x 1   Assistive Device Rolling walker   Distance 3ft forward + 3 feet backward   Balance   Static Sitting Fair   Dynamic Sitting Fair -   Static Standing Fair -   Dynamic Standing Poor +   Ambulatory Poor +   Activity Tolerance   Activity Tolerance Patient limited by fatigue; Other (Comment)  (Bouts of dry cough without secreations (nurse notified))   Nurse Made Aware RN made aware and cleared for session   Exercises   Knee AROM Long Arc Quad 15 reps; Sitting;AROM; Bilateral   Ankle Pumps 15 reps;AROM; Sitting;Bilateral   Marching Sitting;10 reps;AROM; Bilateral   Assessment   Prognosis Fair   Problem List Decreased strength;Decreased endurance;Decreased mobility;Obesity   Assessment Pt was seen to progress towards goals of ambulation, LE strengthening, and transfers  Throughout the session, the pt had noticeable fatigue  Pt had noticeable CONNOLLY during increased activity that took ~5 minutes to subside with SPO2 sats that remained at 100%  At the EOB, the pt had a flexed posture that increased with fatigue  During standing and ambulation, the pt had a flexed posture with increased reliance on the RW  She was able to ambulated only for a short distance d/t fatigue and episodes of dry cough  Pt can continue to be seen for skilled PT throughout her hospital stay to progress toward her goals  PT recommendation upon d/c is post acute rehab when medically cleared  Goals   Patient Goals pt did not express   STG Expiration Date 08/17/22   PT Treatment Day 3   Plan   Treatment/Interventions Functional transfer training;LE strengthening/ROM; Therapeutic exercise; Endurance training;Equipment eval/education;Gait training;Spoke to nursing;Spoke to case management; Bed mobility;Elevations   Progress Progressing toward goals PT Frequency Other (Comment)  (3-6x/week)   Recommendation   PT Discharge Recommendation Post acute rehabilitation services   Equipment Recommended [facility] Recommended Wheeled walker   Change/add to [address]? No   AM-PAC Basic Mobility Inpatient   Turning in Bed Without Bedrails 3   Lying on Back to Sitting on Edge of Flat Bed 3   Moving Bed to Chair 3   Standing Up From Chair 3   Walk in Room 3   Climb 3-5 Stairs 1   Basic Mobility Inpatient Raw Score 16   Basic Mobility Standardized Score 38 32   Highest Level Of Mobility   JH-HLM Goal 5: Stand one or more mins   JH-HLM Achieved 5: Stand (1 or more minutes)   End of Consult   Patient Position at End of Consult Supine;Bed/Chair alarm activated; All needs within reach         [facility], [facility]

## 2022-08-12 LAB
ANION GAP SERPL CALCULATED.3IONS-SCNC: 4 MMOL/L (ref 4–13)
BUN SERPL-MCNC: 32 MG/DL (ref 5–25)
CALCIUM SERPL-MCNC: 8.9 MG/DL (ref 8.3–10.1)
CHLORIDE SERPL-SCNC: 102 MMOL/L (ref 96–108)
CO2 SERPL-SCNC: 31 MMOL/L (ref 21–32)
CREAT SERPL-MCNC: 1.48 MG/DL (ref 0.6–1.3)
GFR SERPL CREATININE-BSD FRML MDRD: 39 ML/MIN/1.73SQ M
GLUCOSE SERPL-MCNC: 121 MG/DL (ref 65–140)
GLUCOSE SERPL-MCNC: 177 MG/DL (ref 65–140)
GLUCOSE SERPL-MCNC: 210 MG/DL (ref 65–140)
GLUCOSE SERPL-MCNC: 227 MG/DL (ref 65–140)
GLUCOSE SERPL-MCNC: 239 MG/DL (ref 65–140)
POTASSIUM SERPL-SCNC: 3.9 MMOL/L (ref 3.5–5.3)
SODIUM SERPL-SCNC: 137 MMOL/L (ref 135–147)

## 2022-08-12 PROCEDURE — 99232 SBSQ HOSP IP/OBS MODERATE 35: CPT | Performed by: INTERNAL MEDICINE

## 2022-08-12 PROCEDURE — 99231 SBSQ HOSP IP/OBS SF/LOW 25: CPT | Performed by: FAMILY MEDICINE

## 2022-08-12 PROCEDURE — 82948 REAGENT STRIP/BLOOD GLUCOSE: CPT

## 2022-08-12 PROCEDURE — 80048 BASIC METABOLIC PNL TOTAL CA: CPT

## 2022-08-12 RX ADMIN — METOPROLOL SUCCINATE 50 MG: 50 TABLET, EXTENDED RELEASE ORAL at 17:16

## 2022-08-12 RX ADMIN — INSULIN LISPRO 2 UNITS: 100 INJECTION, SOLUTION INTRAVENOUS; SUBCUTANEOUS at 21:00

## 2022-08-12 RX ADMIN — INSULIN LISPRO 10 UNITS: 100 INJECTION, SOLUTION INTRAVENOUS; SUBCUTANEOUS at 12:40

## 2022-08-12 RX ADMIN — METOPROLOL SUCCINATE 50 MG: 50 TABLET, EXTENDED RELEASE ORAL at 07:04

## 2022-08-12 RX ADMIN — VANCOMYCIN HYDROCHLORIDE 1000 MG: 1 INJECTION, SOLUTION INTRAVENOUS at 14:17

## 2022-08-12 RX ADMIN — INSULIN LISPRO 10 UNITS: 100 INJECTION, SOLUTION INTRAVENOUS; SUBCUTANEOUS at 17:12

## 2022-08-12 RX ADMIN — TICAGRELOR 90 MG: 90 TABLET ORAL at 20:57

## 2022-08-12 RX ADMIN — BUMETANIDE 2 MG: 2 TABLET ORAL at 10:07

## 2022-08-12 RX ADMIN — ACETAMINOPHEN 975 MG: 325 TABLET ORAL at 18:33

## 2022-08-12 RX ADMIN — PREGABALIN 25 MG: 25 CAPSULE ORAL at 17:16

## 2022-08-12 RX ADMIN — MIRTAZAPINE 7.5 MG: 15 TABLET, FILM COATED ORAL at 21:00

## 2022-08-12 RX ADMIN — SENNOSIDES AND DOCUSATE SODIUM 1 TABLET: 50; 8.6 TABLET ORAL at 17:16

## 2022-08-12 RX ADMIN — PREGABALIN 75 MG: 75 CAPSULE ORAL at 10:08

## 2022-08-12 RX ADMIN — INSULIN LISPRO 4 UNITS: 100 INJECTION, SOLUTION INTRAVENOUS; SUBCUTANEOUS at 17:12

## 2022-08-12 RX ADMIN — Medication: at 10:10

## 2022-08-12 RX ADMIN — ESCITALOPRAM OXALATE 10 MG: 10 TABLET ORAL at 10:08

## 2022-08-12 RX ADMIN — APIXABAN 5 MG: 5 TABLET, FILM COATED ORAL at 10:08

## 2022-08-12 RX ADMIN — INSULIN LISPRO 10 UNITS: 100 INJECTION, SOLUTION INTRAVENOUS; SUBCUTANEOUS at 10:10

## 2022-08-12 RX ADMIN — PANTOPRAZOLE SODIUM 40 MG: 40 TABLET, DELAYED RELEASE ORAL at 07:04

## 2022-08-12 RX ADMIN — INSULIN LISPRO 4 UNITS: 100 INJECTION, SOLUTION INTRAVENOUS; SUBCUTANEOUS at 12:41

## 2022-08-12 RX ADMIN — APIXABAN 5 MG: 5 TABLET, FILM COATED ORAL at 17:16

## 2022-08-12 RX ADMIN — Medication: at 17:17

## 2022-08-12 RX ADMIN — SPIRONOLACTONE 100 MG: 50 TABLET ORAL at 10:08

## 2022-08-12 RX ADMIN — TICAGRELOR 90 MG: 90 TABLET ORAL at 10:08

## 2022-08-12 RX ADMIN — LIDOCAINE 5% 2 PATCH: 700 PATCH TOPICAL at 10:13

## 2022-08-12 RX ADMIN — AMIODARONE HYDROCHLORIDE 100 MG: 200 TABLET ORAL at 07:04

## 2022-08-12 RX ADMIN — ATORVASTATIN CALCIUM 40 MG: 40 TABLET, FILM COATED ORAL at 17:15

## 2022-08-12 NOTE — ASSESSMENT & PLAN NOTE
· Continues with intractable pain at prior chest tube site on right anterior chest wall    This was removed 7/24/22  · Was followed by APS back in [facility], S/P epidural catheter which was removed 8/2  · Current pain regimen: Diaz Tylenol 975 Q8h, PO Dilaudid 2/4 for mod/severe, IV Dilaudid 0 5 mg Q2  · Bowel regimen:  Scheduled senna and MiraLax daily  · Both chest tubes now removed - last chest tube removed on 8/10 by CTS

## 2022-08-12 NOTE — PROGRESS NOTES
[facility]  Progress Note - Amy Quick 1965, 65 y o  female MRN: [id number]  Unit/Bed#: [facility] 429-01 Encounter: [id number]  Primary Care Provider: Rosi Qureshi MD   Date and time admitted to hospital: 8/2/2022  8:31 PM    * Empyema lung, Right  Assessment & Plan  · Recurrent loculated right pleural effusion  S/P right-sided chest tube insertion and removal on 07/24  · CT of the chest on 7/28/22 revealed moderate partially loculated right pleural effusion  · Right chest tube re-placed on 7/29/22 by IR  · Pleural fluid analysis shows neutrophil predominant WBC count  · Fluid cultures from prior anterior chest tube site positive for MRSA  · 8/1 - CXR: Small component of right pleural effusion suspected which may be partially loculated  Indwelling right thoracostomy tube without pneumothorax  · 8/2 CT Chest:  Decreased size of a right-sided pleural effusion and improved aeration of the right lower lobe post placement of a right pleural drainage catheter, with unchanged appearance of a loculated component of the effusion superomedially  There is slightly  increased density of the effusion compatible with a small amount of blood products  2   Appearance of new inflammatory groundglass opacities in the left lower lobe  3   Mild background interstitial edema is unchanged  · 8/6 - S/P VATS Decortation  procedure  · 8/9 - Apical chest tube removed by thoracic surgery   · 8/11 - patient medically cleared for discharge to [facility]  Awaiting insurance authorization     · 8/11- PICC line placed   Plan:  - Continue Abx w/ Vanc given MRSA, Vanc will need to be continued through 8/20 per ID  - Aggressive pulmonary toilet  - Monitor fever curve and white count closely - patient has remained afebrile with stable white count    Acute on chronic heart failure with preserved ejection fraction [facility])  Assessment & Plan  Wt Readings from Last 3 Encounters:   08/11/22 80 4 kg (177 lb 4 oz) 08/02/22 77 7 kg (171 lb 6 4 oz)   07/19/22 78 2 kg (172 lb 6 4 oz)     · [facility] admitted [facility] on [facility] with CHF exacerbation, now optimized  · Most recent EF 50% with normal systolic and diastolic function on 2/90/3970  Currently stable and euvolemic  · On Bumex 2 mg daily with Aldactone 100 mg daily - restarted on 7/29  · Continue low-salt diet, strict I's and O's monitoring  · Per Cardiology at [facility] 1153 upon discharge  · Will need follow-up with General Cardiology & EP service upon discharge for ICD consideration    Chest wall wound infection  Assessment & Plan  · Purulent discharge noted at site of recent chest tube placement  · Cultures positive for MRSA on 07/28 susceptible to vanc  · Currently on IV vancomycin - will continue thru 8/16 per ID  · Continue local wound care/dressing changes     Pain at Chest tube insertion site  Assessment & Plan  · Continues with intractable pain at prior chest tube site on right anterior chest wall  This was removed 7/24/22  · Was followed by APS back in [employer], S/P epidural catheter which was removed 8/2  · Current pain regimen: Diaz Tylenol 975 Q8h, PO Dilaudid 2/4 for mod/severe, IV Dilaudid 0 5 mg Q2  · Bowel regimen:  Scheduled senna and MiraLax daily  · Both chest tubes now removed - last chest tube removed on 8/10 by CTS     Acute Respiratory insufficiency on chronic hypoxic respiratory failure  Assessment & Plan  · She is status post trach   Currently on 6-8L with sats in the high 90s, at home uses 10 L  · Status post recent right pneumothorax requiring chest tube and now with right empyema  · Continue aggressive respiratory protocol, p r n  suctioning  · Continue Xoponex and Atrovent per Pulm q6hrs  · Encourage out of bed, incentive spirometry  · Pulmonology following      CKD (chronic kidney disease) stage 3, GFR 30-59 ml/min ([facility])  Assessment & Plan  · MODESTO noted on initial admission to [facility] with elevated Cr 1 64, now resolved  · Baseline Cr being 0 9-1 0  · Continue to monitor closely  Losartan on hold  · Avoid nephrotoxic agents     Anemia  Assessment & Plan    Results from last 7 days   Lab Units 08/11/22  0523 08/10/22  0406 08/09/22  0840 08/08/22  0435 08/07/22  1856 08/07/22  1423 08/07/22  0311   HEMOGLOBIN g/dL 10 2* 9 7* 9 1* 8 4* 7 7* 8 2* 7 7*   HEMATOCRIT % 34 9 30 5* 29 0* 26 1* 24 8* 26 9* 25 1*     Most likely anemia of chronic disease due to prolonged illness  No active bleeding noted  Hg (8/4): 6 8 s/p  5 unit PRBC's since this admission  Hb stable, monitor closely  Consider transfusing if < 8 due to history of CAD  Continue Iron replacement as well as daily miralax     Tracheostomy in place [facility])  Assessment & Plan  · Trach placed in the context of cardiac arrest/prolonged ventilator dependent state November 2021  · Decannulated at [facility] 12/11/21  · Patient requires frequent tracheostomy changes and suctioning  · Per discussion with speech therapy,  video barium swallow was done for sense of food getting stuck  Appropriate for regular diet  · On trach collar O2 with humidification and tolerating well btw 6-8L     CAD (coronary artery disease)  Assessment & Plan  · STEMI 10/22/2021 s/p PATRICA mid circumflex OM1  OM2 was ballooned but not stented    · Pulseless VT 10/27/21 - repeat LHC 90% mid circumflex stenosis, but could not be intervened on  · VT 10/28/21 LHC:  found to have apical LAD complete occlusion was felt to be small to be intervened    · Cardiac carrest 1/1/22 - NO cardiac cath at [facility] felt to be hypoxic vs  VT induced  · On metoprolol 50mg BID, Brilinta 90 mg BID and Lipitor 40 mg qd     A-fib [facility])  Assessment & Plan  Follows closely with Cardiology  Rhythm controlled with amiodarone 100 mg daily and AC with Eliquis 5 mg BID    Type 2 diabetes mellitus with hyperglycemia [facility])  Assessment & Plan  Lab Results   Component Value Date    HGBA1C 12 7 (H) 05/22/2022       Recent Labs 08/11/22  0537 08/11/22  1038 08/11/22  1601 08/11/22 2051   POCGLU 173* 166* 146* 154*     Uncontrolled per most recent A1c but gradually improving glycemic control  Home regimen: Lantus 16U QHS and Novolog 4U TID  Previous inpatient regimen: Lantus 30U qHS + Humalog 10U TID + SSI 4  Lantus now d/c'd and Humalog changed to 10U TID  Will continue for now and adjust as needed to maintain  - 180    Hypertension  Assessment & Plan  BP stable  On home losartan 50 mg BID, follows closely with Cardiology  Losartan on hold but can consider restarting as MODESTO has now resolved  Monitor BP closely     Hyperlipidemia associated with type 2 diabetes mellitus (HCC)  Assessment & Plan  - Continue Atorvastatin 40 mg daily  Diabetic peripheral neuropathy (HCC)  Assessment & Plan  Continue PTA Lyrica as documented     Anxiety  Assessment & Plan  On Lexapro 10 mg daily as well as Atarax prn             Subjective/Objective     Subjective:   Patient seen and examined at bedside  No overnight events  She states pain around chest tube site is improving  Denies any further complains     Objective:  Vitals: Blood pressure 133/77, pulse 60, temperature 98 1 °F (36 7 °C), temperature source Axillary, resp  rate 17, weight 80 4 kg (177 lb 4 oz), SpO2 100 %  ,Body mass index is 26 18 kg/m²  Intake/Output Summary (Last 24 hours) at 8/12/2022 2585  Last data filed at 8/12/2022 0801  Gross per 24 hour   Intake 840 ml   Output 2440 ml   Net -1600 ml       Invasive Devices  Report    Peripherally Inserted Central Catheter Line  Duration           PICC Line 08/11/22 Right Brachial 1 day          Peripheral Intravenous Line  Duration           Peripheral IV 08/10/22 Proximal;Right;Ventral (anterior) Forearm 2 days          Airway  Duration           Surgical Airway Shiley Fenestrated 163 days                Physical Exam  Vitals reviewed     Constitutional:       General: She is not in acute distress      Appearance: She is not ill-appearing or diaphoretic       Comments: Trach collar in place   HENT:      Head: Normocephalic and atraumatic       Right Ear: External ear normal       Left Ear: External ear normal       Mouth/Throat:      Mouth: Mucous membranes are moist    Eyes:      Conjunctiva/sclera: Conjunctivae normal    Cardiovascular:      Rate and Rhythm: Normal rate and regular rhythm       Heart sounds: Normal heart sounds  No murmur heard  Pulmonary:      Effort: Pulmonary effort is normal  No respiratory distress       Breath sounds: Normal breath sounds       Comments: Right chest tube removed  CT site clean and dry  Musculoskeletal:         General: No swelling, tenderness, deformity or signs of injury       Cervical back: Normal range of motion       Right lower leg: No edema       Left lower leg: No edema  Skin:     General: Skin is warm       Coloration: Skin is not pale       Findings: No erythema or rash  Neurological:      General: No focal deficit present       Mental Status: She is alert and oriented to person, place, and time  Psychiatric:         Behavior: Behavior normal      Lab, Imaging and other studies: I have personally reviewed pertinent reports       Results from last 7 days   Lab Units 08/11/22  0523 08/10/22  0406 08/09/22  0840   WBC Thousand/uL 7 94 9 24 8 92   HEMOGLOBIN g/dL 10 2* 9 7* 9 1*   HEMATOCRIT % 34 9 30 5* 29 0*   PLATELETS Thousands/uL 484* 514* 446*   NEUTROS PCT % 74 74 76*   MONOS PCT % 9 10 9     Results from last 7 days   Lab Units 08/12/22  0442 08/11/22  0523 08/10/22  0406 08/08/22  0435 08/07/22  1856 08/06/22  1220 08/06/22  1118 08/06/22  1058 08/06/22  0951   POTASSIUM mmol/L 3 9 4 4 4 0   < > 4 4   < >  --   --   --    CHLORIDE mmol/L 102 102 100   < > 102   < >  --   --   --    CO2 mmol/L 31 29 31   < > 28   < >  --   --   --    CO2, I-STAT mmol/L  --   --   --   --   --   --  26 25 30   BUN mg/dL 32* 34* 30*   < > 43*   < >  --   --   --    CREATININE mg/dL 1 48* 1 44* 1 23   < > 1 39*   < >  --   --   --    CALCIUM mg/dL 8 9 8 8 8 7   < > 7 7*   < >  --   --   --    ALK PHOS U/L  --   --   --   --  56  --   --   --   --    ALT U/L  --   --   --   --  11*  --   --   --   --    AST U/L  --   --   --   --  19  --   --   --   --    GLUCOSE, ISTAT mg/dl  --   --   --   --   --   --  220* 201* 224*    < > = values in this interval not displayed       Results from last 7 days   Lab Units 08/07/22  0311 08/06/22  1220   MAGNESIUM mg/dL 2 1 1 6     Lab Results   Component Value Date    PHOS 4 6 (H) 07/18/2022    PHOS 3 9 07/17/2022    PHOS 4 2 07/16/2022      Results from last 7 days   Lab Units 08/06/22  0415 08/05/22 2029 08/05/22  1039   PTT seconds 37 82* 43*     Results from last 7 days   Lab Units 08/07/22  1856   LACTIC ACID mmol/L 0 7     0   Lab Value Date/Time    TROPONINI >40 00 (H) 10/24/2021 0839    TROPONINI >40 00 (H) 10/22/2021 1851    TROPONINI >40 00 (H) 10/22/2021 1442    TROPONINI <0 03 08/26/2021 0802    TROPONINI <0 03 04/18/2021 1413    TROPONINI <0 02 11/17/2020 1537    TROPONINI <0 03 07/26/2020 1821    TROPONINI <0 02 06/02/2020 2250    TROPONINI <0 02 06/02/2020 1952    TROPONINI <0 02 03/04/2019 2122    TROPONINI <0 02 10/07/2018 1516    TROPONINI <0 02 08/12/2018 0042    TROPONINI <0 02 08/11/2018 2058    TROPONINI <0 02 08/11/2018 1543    TROPONINI <0 02 08/10/2018 1912     ABG:  Lab Results   Component Value Date    PHART 7 378 08/07/2022    XWR4ISU 46 0 (H) 08/07/2022    PO2ART 70 3 (L) 08/07/2022    TMU1TOE 26 5 08/07/2022    BEART 1 1 08/07/2022    SOURCE Line, Arterial 08/07/2022     VTE Pharmacologic Prophylaxis: Reason for no pharmacologic prophylaxis Eliquis     VTE Mechanical Prophylaxis: sequential compression device

## 2022-08-12 NOTE — PROGRESS NOTES
Vancomycin IV Pharmacy-to-Dose Consultation    Sylvia Ugarte is a 65 y o  female who is currently receiving Vancomycin IV with management by the Pharmacy Consult service  Assessment/Plan:  The patient was reviewed  Renal function is stable and no signs or symptoms of nephrotoxicity and/or infusion reactions were documented in the chart  Vancomycin level was supratherapeutic  Based on todays assessment, continue vancomycin (day # 12) dosing of 1000mg q 24h starting 8/11, with a plan for trough to be drawn at 1330 on 8/13  Per ID note, treat until 8/20  We will continue to follow the patients culture results and clinical progress daily  Ruy LAIRD  Ph  Pharmacist

## 2022-08-12 NOTE — ASSESSMENT & PLAN NOTE
Wt Readings from Last 3 Encounters:   08/11/22 80 4 kg (177 lb 4 oz)   08/02/22 77 7 kg (171 lb 6 4 oz)   07/19/22 78 2 kg (172 lb 6 4 oz)     · [facility] admitted to [facility] on 07[facility] with CHF exacerbation, now optimized  · Most recent EF 50% with normal systolic and diastolic function on 5/59/8400  Currently stable and euvolemic  · On Bumex 2 mg daily with Aldactone 100 mg daily - restarted on 7/29  · Continue low-salt diet, strict I's and O's monitoring  · Per Cardiology at [facility] 1153 upon discharge    · Will need follow-up with General Cardiology & EP service upon discharge for ICD consideration

## 2022-08-12 NOTE — PROGRESS NOTES
Progress Note - Infectious Disease   Kelli Red 65 y o  female MRN: [id number]  Unit/Bed#: [facility] 429-01 Encounter: [id number]      Impression/Recommendations:  1  Probable right-sided empyema   CT show loculated right pleural effusion   Patient is status post placement of chest tube on 07/29, with pleural fluid parameters consistent with empyema   Culture has no growth, likely due to prior antibiotic   Given MRSA in right chest wound, MRSA is likely pathogen in empyema   Despite chest tube drainage, repeat chest CT on 08/02 showed persistent and unchanged loculated empyema   Patient is now status post VATS/decortication   Operative culture with no growth, likely due to prolonged antibiotic prior to surgery   Chest tube was removed  Patient is clinically much improved  Continue IV vancomycin  Monitor temperature/WBC  Follow-up final operative culture  Treat x 2 weeks after VATS, through 8/20      2  Developing sepsis, with leukocytosis and tachypnea, likely secondary to empyema above   Patient is clinically and systemically improved   WBC has normalized   Tachypnea resolved  Cecelia David Antibiotic plan as in below  Monitor temperature/WBC  Monitor hemodynamics      3  MRSA right chest wall infection, as site recent/prior chest tube placement for spontaneous pneumothorax  Antibiotic plan as in above  Serial exams      4  Acute on chronic hypoxic respiratory failure, likely multifactorial   Respiratory culture with MRSA and Pseudomonas but no obvious pneumonia   These isolates are most likely airway colonization  Management per primary service      5  Recent spontaneous pneumothorax, status post chest tube placement on 07/20   This was removed on 07/24      6  DM, type 2, poorly controlled, with hyperglycemia and elevated hemoglobin A1c   This is risk factor for infection above  Management per primary service      7  MODESTO   Creatinine up and down, overall stable  Antibiotic dosages adjusted accordingly    Monitor creatinine      Discussed with patient in detail regarding the above plan  Plan for discharge to rehabilitation noted  Okay for discharge any time, from ID viewpoint      Antibiotics:  Vancomycin # 17  POD # 6     Subjective:  Patient's right-sided chest pain stable, mild and controlled  Temperature stays down   No chills  She is tolerating antibiotic well   No nausea, vomiting or diarrhea      Objective:  Vitals:  Temp:  [97 4 °F (36 3 °C)-98 7 °F (37 1 °C)] 97 6 °F (36 4 °C)  HR:  [59-70] 62  Resp:  [15-18] 18  BP: (119-156)/(62-96) 119/62  SpO2:  [94 %-100 %] 96 %  Temp (24hrs), Av 2 °F (36 8 °C), Min:97 4 °F (36 3 °C), Max:98 7 °F (37 1 °C)  Current: Temperature: 97 6 °F (36 4 °C)    Physical Exam:     General: Awake, alert, cooperative, no distress  Neck:  Supple  No mass  No lymphadenopathy  Lungs: Decreased breath sounds on right, sparse right basilar rales, no wheezing, respirations unlabored  Heart:  Regular rate and rhythm, S1 and S2 normal, no murmur  Abdomen: Soft, nondistended, non-tender, bowel sounds active all four quadrants, no masses, no organomegaly  Extremities: Trace leg edema  No erythema/warmth  No ulcer  Nontender to palpation  Skin:  No rash  Neuro: Moves all extremities  Invasive Devices  Report    Peripherally Inserted Central Catheter Line  Duration           PICC Line 22 Right Brachial 1 day          Peripheral Intravenous Line  Duration           Peripheral IV 08/10/22 Proximal;Right;Ventral (anterior) Forearm 2 days          Airway  Duration           Surgical Airway Shiley Fenestrated 163 days                Labs studies:   I have personally reviewed pertinent labs    Results from last 7 days   Lab Units 22  0442 22  0523 08/10/22  0406 22  0435 22  1856 22  1220 22  1118 22  1058 22  0951   POTASSIUM mmol/L 3 9 4 4 4 0   < > 4 4   < >  --   --   --    CHLORIDE mmol/L 102 102 100   < > 102   < >  --   -- --    CO2 mmol/L 31 29 31   < > 28   < >  --   --   --    CO2, I-STAT mmol/L  --   --   --   --   --   --  26 25 30   BUN mg/dL 32* 34* 30*   < > 43*   < >  --   --   --    CREATININE mg/dL 1 48* 1 44* 1 23   < > 1 39*   < >  --   --   --    EGFR ml/min/1 73sq m 39 40 49   < > 42   < >  --   --   --    GLUCOSE, ISTAT mg/dl  --   --   --   --   --   --  220* 201* 224*   CALCIUM mg/dL 8 9 8 8 8 7   < > 7 7*   < >  --   --   --    AST U/L  --   --   --   --  19  --   --   --   --    ALT U/L  --   --   --   --  11*  --   --   --   --    ALK PHOS U/L  --   --   --   --  56  --   --   --   --     < > = values in this interval not displayed  Results from last 7 days   Lab Units 08/11/22  0523 08/10/22  0406 08/09/22  0840   WBC Thousand/uL 7 94 9 24 8 92   HEMOGLOBIN g/dL 10 2* 9 7* 9 1*   PLATELETS Thousands/uL 484* 514* 446*     Results from last 7 days   Lab Units 08/06/22  1011 08/06/22  1007   GRAM STAIN RESULT  Rare Polys  No bacteria seen 1+ Polys  No organisms seen   BODY FLUID CULTURE, STERILE  No growth  --        Imaging Studies:   I have personally reviewed pertinent imaging study reports and images in PACS  EKG, Pathology, and Other Studies:   I have personally reviewed pertinent reports

## 2022-08-12 NOTE — ASSESSMENT & PLAN NOTE
Lab Results   Component Value Date    HGBA1C 12 7 (H) 05/22/2022       Recent Labs     08/11/22  0537 08/11/22  1038 08/11/22  1601 08/11/22 2051   POCGLU 173* 166* 146* 154*     Uncontrolled per most recent A1c but gradually improving glycemic control  Home regimen: Lantus 16U QHS and Novolog 4U TID  Previous inpatient regimen: Lantus 30U qHS + Humalog 10U TID + SSI 4  Lantus now d/c'd and Humalog changed to 10U TID  Will continue for now and adjust as needed to maintain  - 180

## 2022-08-12 NOTE — ASSESSMENT & PLAN NOTE
· STEMI 10/22/2021 s/p PATRICA mid circumflex OM1  OM2 was ballooned but not stented    · Pulseless VT 10/27/21 - repeat LHC 90% mid circumflex stenosis, but could not be intervened on  · VT 10/28/21 LHC:  found to have apical LAD complete occlusion was felt to be small to be intervened    · Cardiac carrest 1/1/22 - [facility] felt to be hypoxic vs  VT induced  · On metoprolol 50mg BID, Brilinta 90 mg BID and Lipitor 40 mg qd

## 2022-08-12 NOTE — ASSESSMENT & PLAN NOTE
· Recurrent loculated right pleural effusion  S/P right-sided chest tube insertion and removal on 07/24  · CT of the chest on 7/28/22 revealed moderate partially loculated right pleural effusion  · Right chest tube re-placed on 7/29/22 by IR  · Pleural fluid analysis shows neutrophil predominant WBC count  · Fluid cultures from prior anterior chest tube site positive for MRSA  · 8/1 - CXR: Small component of right pleural effusion suspected which may be partially loculated  Indwelling right thoracostomy tube without pneumothorax  · 8/2 CT Chest:  Decreased size of a right-sided pleural effusion and improved aeration of the right lower lobe post placement of a right pleural drainage catheter, with unchanged appearance of a loculated component of the effusion superomedially  There is slightly  increased density of the effusion compatible with a small amount of blood products  2   Appearance of new inflammatory groundglass opacities in the left lower lobe  3   Mild background interstitial edema is unchanged  · 8/6 - S/P VATS Decortation  procedure  · 8/9 - Apical chest tube removed by thoracic surgery   · 8/11 - patient medically cleared for discharge to [facility]  Awaiting insurance authorization     · 8/11- PICC line placed   Plan:  - Continue Abx w/ Vanc given MRSA, Vanc will need to be continued through 8/20 per ID  - Aggressive pulmonary toilet  - Monitor fever curve and white count closely - patient has remained afebrile with stable white count

## 2022-08-12 NOTE — ASSESSMENT & PLAN NOTE
· Trach placed in the context of cardiac arrest/prolonged ventilator dependent state November 2021  · Decannulated at [facility] 12/11/21  · Patient requires frequent tracheostomy changes and suctioning  · Per discussion with speech therapy,  video barium swallow was done for sense of food getting stuck   Appropriate for regular diet  · On trach collar O2 with humidification and tolerating well btw 6-8L

## 2022-08-13 LAB
ANION GAP SERPL CALCULATED.3IONS-SCNC: 6 MMOL/L (ref 4–13)
BACTERIA TISS AEROBE CULT: ABNORMAL
BUN SERPL-MCNC: 37 MG/DL (ref 5–25)
CALCIUM SERPL-MCNC: 8.9 MG/DL (ref 8.3–10.1)
CHLORIDE SERPL-SCNC: 99 MMOL/L (ref 96–108)
CO2 SERPL-SCNC: 31 MMOL/L (ref 21–32)
CREAT SERPL-MCNC: 1.71 MG/DL (ref 0.6–1.3)
GFR SERPL CREATININE-BSD FRML MDRD: 33 ML/MIN/1.73SQ M
GLUCOSE SERPL-MCNC: 188 MG/DL (ref 65–140)
GLUCOSE SERPL-MCNC: 209 MG/DL (ref 65–140)
GLUCOSE SERPL-MCNC: 271 MG/DL (ref 65–140)
GLUCOSE SERPL-MCNC: 278 MG/DL (ref 65–140)
GLUCOSE SERPL-MCNC: 287 MG/DL (ref 65–140)
GRAM STN SPEC: ABNORMAL
GRAM STN SPEC: ABNORMAL
POTASSIUM SERPL-SCNC: 4.1 MMOL/L (ref 3.5–5.3)
SODIUM SERPL-SCNC: 136 MMOL/L (ref 135–147)
VANCOMYCIN TROUGH SERPL-MCNC: 25.2 UG/ML (ref 10–20)

## 2022-08-13 PROCEDURE — 94760 N-INVAS EAR/PLS OXIMETRY 1: CPT

## 2022-08-13 PROCEDURE — 97112 NEUROMUSCULAR REEDUCATION: CPT

## 2022-08-13 PROCEDURE — 99232 SBSQ HOSP IP/OBS MODERATE 35: CPT | Performed by: INTERNAL MEDICINE

## 2022-08-13 PROCEDURE — 80202 ASSAY OF VANCOMYCIN: CPT | Performed by: SURGERY

## 2022-08-13 PROCEDURE — 82948 REAGENT STRIP/BLOOD GLUCOSE: CPT

## 2022-08-13 PROCEDURE — 97110 THERAPEUTIC EXERCISES: CPT

## 2022-08-13 PROCEDURE — 99232 SBSQ HOSP IP/OBS MODERATE 35: CPT | Performed by: FAMILY MEDICINE

## 2022-08-13 PROCEDURE — 97116 GAIT TRAINING THERAPY: CPT

## 2022-08-13 PROCEDURE — 80048 BASIC METABOLIC PNL TOTAL CA: CPT | Performed by: FAMILY MEDICINE

## 2022-08-13 PROCEDURE — 97530 THERAPEUTIC ACTIVITIES: CPT

## 2022-08-13 PROCEDURE — 97535 SELF CARE MNGMENT TRAINING: CPT

## 2022-08-13 RX ORDER — INSULIN LISPRO 100 [IU]/ML
12 INJECTION, SOLUTION INTRAVENOUS; SUBCUTANEOUS
Status: DISCONTINUED | OUTPATIENT
Start: 2022-08-13 | End: 2022-08-14

## 2022-08-13 RX ORDER — INSULIN GLARGINE 100 [IU]/ML
15 INJECTION, SOLUTION SUBCUTANEOUS
Status: DISCONTINUED | OUTPATIENT
Start: 2022-08-13 | End: 2022-08-14

## 2022-08-13 RX ADMIN — Medication: at 18:07

## 2022-08-13 RX ADMIN — PANTOPRAZOLE SODIUM 40 MG: 40 TABLET, DELAYED RELEASE ORAL at 05:47

## 2022-08-13 RX ADMIN — INSULIN LISPRO 12 UNITS: 100 INJECTION, SOLUTION INTRAVENOUS; SUBCUTANEOUS at 16:49

## 2022-08-13 RX ADMIN — PREGABALIN 75 MG: 75 CAPSULE ORAL at 09:40

## 2022-08-13 RX ADMIN — TICAGRELOR 90 MG: 90 TABLET ORAL at 21:56

## 2022-08-13 RX ADMIN — INSULIN LISPRO 10 UNITS: 100 INJECTION, SOLUTION INTRAVENOUS; SUBCUTANEOUS at 07:45

## 2022-08-13 RX ADMIN — AMIODARONE HYDROCHLORIDE 100 MG: 200 TABLET ORAL at 07:45

## 2022-08-13 RX ADMIN — Medication: at 09:47

## 2022-08-13 RX ADMIN — METOPROLOL SUCCINATE 50 MG: 50 TABLET, EXTENDED RELEASE ORAL at 05:48

## 2022-08-13 RX ADMIN — VANCOMYCIN HYDROCHLORIDE 1000 MG: 1 INJECTION, SOLUTION INTRAVENOUS at 13:59

## 2022-08-13 RX ADMIN — APIXABAN 5 MG: 5 TABLET, FILM COATED ORAL at 18:07

## 2022-08-13 RX ADMIN — INSULIN LISPRO 6 UNITS: 100 INJECTION, SOLUTION INTRAVENOUS; SUBCUTANEOUS at 21:55

## 2022-08-13 RX ADMIN — BUMETANIDE 2 MG: 2 TABLET ORAL at 09:40

## 2022-08-13 RX ADMIN — INSULIN LISPRO 6 UNITS: 100 INJECTION, SOLUTION INTRAVENOUS; SUBCUTANEOUS at 11:57

## 2022-08-13 RX ADMIN — SENNOSIDES AND DOCUSATE SODIUM 1 TABLET: 50; 8.6 TABLET ORAL at 18:07

## 2022-08-13 RX ADMIN — INSULIN LISPRO 6 UNITS: 100 INJECTION, SOLUTION INTRAVENOUS; SUBCUTANEOUS at 16:49

## 2022-08-13 RX ADMIN — LIDOCAINE 5% 1 PATCH: 700 PATCH TOPICAL at 09:44

## 2022-08-13 RX ADMIN — APIXABAN 5 MG: 5 TABLET, FILM COATED ORAL at 09:40

## 2022-08-13 RX ADMIN — TICAGRELOR 90 MG: 90 TABLET ORAL at 09:40

## 2022-08-13 RX ADMIN — HYDROMORPHONE HYDROCHLORIDE 4 MG: 2 TABLET ORAL at 20:34

## 2022-08-13 RX ADMIN — SPIRONOLACTONE 100 MG: 50 TABLET ORAL at 09:40

## 2022-08-13 RX ADMIN — MIRTAZAPINE 7.5 MG: 15 TABLET, FILM COATED ORAL at 21:56

## 2022-08-13 RX ADMIN — INSULIN LISPRO 4 UNITS: 100 INJECTION, SOLUTION INTRAVENOUS; SUBCUTANEOUS at 06:00

## 2022-08-13 RX ADMIN — INSULIN GLARGINE 15 UNITS: 100 INJECTION, SOLUTION SUBCUTANEOUS at 21:55

## 2022-08-13 RX ADMIN — ACETAMINOPHEN 975 MG: 325 TABLET ORAL at 05:47

## 2022-08-13 RX ADMIN — METOPROLOL SUCCINATE 50 MG: 50 TABLET, EXTENDED RELEASE ORAL at 18:07

## 2022-08-13 RX ADMIN — INSULIN LISPRO 12 UNITS: 100 INJECTION, SOLUTION INTRAVENOUS; SUBCUTANEOUS at 11:57

## 2022-08-13 RX ADMIN — ESCITALOPRAM OXALATE 10 MG: 10 TABLET ORAL at 09:40

## 2022-08-13 RX ADMIN — ATORVASTATIN CALCIUM 40 MG: 40 TABLET, FILM COATED ORAL at 16:48

## 2022-08-13 RX ADMIN — PREGABALIN 25 MG: 25 CAPSULE ORAL at 18:07

## 2022-08-13 NOTE — ASSESSMENT & PLAN NOTE
Wt Readings from Last 3 Encounters:   08/13/22 78 7 kg (173 lb 8 oz)   08/02/22 77 7 kg (171 lb 6 4 oz)   07/19/22 78 2 kg (172 lb 6 4 oz)     · Initially admitted to [facility] on 07/15 with CHF exacerbation, now optimized  · Most recent EF 50% with normal systolic and diastolic function on 5/54/5142  Currently stable and euvolemic  · On Bumex 2 mg daily with Aldactone 100 mg daily - restarted on 7/29  · Continue low-salt diet, strict I's and O's monitoring  · Per Cardiology at [facility] 1153 upon discharge    · Will need follow-up with General Cardiology & EP service upon discharge for ICD consideration

## 2022-08-13 NOTE — ASSESSMENT & PLAN NOTE
· Continues with intractable pain at prior chest tube site on right anterior chest wall    This was removed 7/24/22  · Was followed by APS back in [place], S/P epidural catheter which was removed 8/2  · Current pain regimen: Diaz Tylenol 975 Q8h, PO Dilaudid 2/4 for mod/severe, IV Dilaudid 0 5 mg Q2  · Bowel regimen:  Scheduled senna and MiraLax daily  · Both chest tubes now removed - last chest tube removed on 8/10 by CTS

## 2022-08-13 NOTE — PHYSICAL THERAPY NOTE
PHYSICAL THERAPY TREATMENT  NAME:  Kelli Red  DATE: 22    AGE:   65 y o  Mrn:   [id number]  ADMIT DX:  Empyema lung ([facility] [address] ) [[id number]]    Past Medical History:   Diagnosis Date    Anxiety     Aortic aneurysm ([facility] )     Arthritis     Depression     Diabetes mellitus ([facility] )     Fibromyalgia     GERD (gastroesophageal reflux disease)     GERD (gastroesophageal reflux disease)     H/O cardiovascular stress test 2018    no ischemia  EF 70%  H/O echocardiogram 2019    EF 60%  Mild LVH  trivial effusion  Hyperlipidemia     Hypertension     Migraines     Psychiatric disorder     anxiety    Uncontrolled hypertension 2015    Last Assessment & Plan:  BP today above goal <140/90, apparently asymptomatic  Prior BP elevations were attributed to not taking medication  Consider increased medication if persistent on f/u  Past Surgical History:   Procedure Laterality Date    BACK SURGERY      Lumbar epidural steroid injection    CARDIAC CATHETERIZATION N/A 10/22/2021    Procedure: Cardiac pci;  Surgeon: Sherrie Junior MD;  [facility]; Service: Cardiology    CARDIAC CATHETERIZATION  10/22/2021    Procedure: Cardiac catheterization;  Surgeon: Sherrie Junior MD;  [facility]; Service: Cardiology    CARDIAC CATHETERIZATION Left 10/27/2021    Procedure: Cardiac Left Heart Cath;  Surgeon: Doreen Villarreal MD;  [facility]; Service: Cardiology    CARDIAC CATHETERIZATION N/A 10/27/2021    Procedure: Cardiac pci;  Surgeon: Doreen Villarreal MD;  [facility]; Service: Cardiology    CARDIAC CATHETERIZATION N/A 10/28/2021    Procedure: Cardiac pci;  Surgeon: Ivonne Arellano DO;  [facility];   Service: Cardiology    CARPAL TUNNEL RELEASE Left      SECTION      CHOLECYSTECTOMY      COLONOSCOPY      incomplete    COLONOSCOPY      EYE SURGERY      HYSTERECTOMY      Total    IR CHEST TUBE PLACEMENT  2022 IR CHEST TUBE PLACEMENT  7/22/2022    IR CHEST TUBE PLACEMENT  7/29/2022    LUNG DECORTICATION Right 8/6/2022    Procedure: DECORTICATION LUNG;  Surgeon: John Haywood MD;  Location: [facility] MAIN [facility];  Service: Thoracic    OVARIAN CYST REMOVAL      PEG W/TRACHEOSTOMY PLACEMENT N/A 11/12/2021    Procedure: TRACHEOSTOMY WITH INSERTION PEG TUBE;  Surgeon: Imani Son To, DO;  Location: [facility] MAIN [facility];  Service: General    THORACOSCOPY VIDEO ASSISTED SURGERY (VATS) Right 8/6/2022    Procedure: THORACOSCOPY VIDEO ASSISTED SURGERY (VATS), RIGHT;  Surgeon: John aHywood MD;  Location: [facility];  Service: Thoracic    TUBAL LIGATION      UPPER GASTROINTESTINAL ENDOSCOPY         Length Of Stay: 11 08/13/22 1121   PT Last Visit   PT Visit Date 08/13/22   Note Type   Note Type Treatment for insurance authorization   End of Consult   Patient Position at End of Consult All needs within reach;Bed/Chair alarm activated   Pain Assessment   Pain Assessment Tool 0-10   Pain Score No Pain   Restrictions/Precautions   Weight Bearing Precautions Per Order No   Braces or Orthoses   (none)   Other Precautions Multiple lines;O2;Fall Risk;Pain  (trach collar 5Lo2- venturi for ambulation)   General   Chart Reviewed Yes   Response to Previous Treatment Patient with no complaints from previous session  Family/Caregiver Present No   Cognition   Overall Cognitive Status WFL   Arousal/Participation Alert   Attention Within functional limits   Orientation Level Oriented X4   Memory Within functional limits   Following Commands Follows all commands and directions without difficulty   Comments flat affect- inc time for motivation- minimal eye contact and attempts at verbalizations   Subjective   Subjective pt agreeable to PT w/ encouragement- reports already  working w/ OT earlier- wants to know why PT is here "so early"- pt agreeable w/ explanation and education on rehab process + expectations for when she is d/c to rehab     Bed Mobility Supine to Sit 5  Supervision   Additional items HOB elevated; Increased time required  (> 2 mins for completion w/ HOB elevated)   Additional Comments sits EOB for seated therex prior to xfers + gait x 12 mins   Transfers   Sit to Stand 4  Minimal assistance   Additional items Increased time required;Verbal cues; Assist x 1  (cues for hand placement)   Stand to Sit 5  Supervision  (cues for hand placement on chair)   Additional Comments sit<> stands x5 + standing marching ; seated therex at EOB priort o gait   Ambulation/Elevation   Gait pattern Shuffling;Decreased foot clearance; Wide KATERINE; Excessively slow   Assistive Device Rolling walker   Distance 20+35' standing rest break - Spo2 91%- SOB noted- fatigues easily- requesting return to room HR stable 80's   Ambulation/Elevation Additional Comments continues to require VC for proximity to RW + upright posture and gaze   Balance   Static Sitting Fair +   Dynamic Sitting Fair   Static Standing Fair -   Dynamic Standing Poor +   Ambulatory Poor +   Endurance Deficit   Endurance Deficit Yes   Endurance Deficit Description weakness; fatigue; SOB; limited gait distances  - inc time required for all activity + frequent rests required   Activity Tolerance   Activity Tolerance Patient limited by fatigue;Patient tolerated treatment well   Nurse Made Aware RN aware- cleared for session   Exercises   Knee AROM Long Arc Quad Sitting;15 reps;Right;Left  (x2)   Ankle Pumps Sitting;25 reps;Right;Left  (x2)   Marching Sitting;25 reps;Right;Left  (x2)   Balance training  STS x5 + 30 reps marching each w/ UE support on RW- focus on  upright posture + breathing tech   Assessment   Prognosis Good   Problem List Decreased strength;Decreased endurance; Impaired balance;Decreased mobility; Impaired judgement;Decreased safety awareness;Decreased skin integrity;Pain   Assessment seen for skilled pT for tx session as documented above   Pt remains limited by faituge; SOB and CONNOLLY w/ stable Spo2 and HR  Pt requiring inc time; frequent rest breaks throughout tx session  ambualtion distacnes limited to very short household distances at this time w/ RW  Due to deficits noted PT cont to recommend inpt rehab on d/c to maximize functional recovery and prevent hospital readmission  PT will continue to follow and progress toward goals  Barriers to Discharge Inaccessible home environment   Goals   Patient Goals just feel better"   STG Expiration Date 08/17/22   PT Treatment Day 4   Plan   Treatment/Interventions Functional transfer training;LE strengthening/ROM; Elevations; Therapeutic exercise; Endurance training;Cognitive reorientation;Patient/family training;Bed mobility; Equipment eval/education;Gait training; Compensatory technique education;Continued evaluation;Spoke to nursing   Progress Progressing toward goals   PT Frequency   (3-6x/wk)   Recommendation   PT Discharge Recommendation Post acute rehabilitation services   Equipment Recommended [facility] Recommended Wheeled walker   Change/add to [place]? No   AM-PAC Basic Mobility Inpatient   Turning in Bed Without Bedrails 3   Lying on Back to Sitting on Edge of Flat Bed 3   Moving Bed to Chair 3   Standing Up From Chair 3   Walk in Room 3   Climb 3-5 Stairs 1   Basic Mobility Inpatient Raw Score 16   Basic Mobility Standardized Score 38 32   Turning Head Towards Sound 4   Follow Simple Instructions 4   Low Function Basic Mobility Raw Score 24   Low Function Basic Mobility Standardized Score 38 53   Highest Level Of Mobility   JH-HLM Goal 5: Stand one or more mins   JH-HLM Achieved 5: Stand (1 or more minutes)   Education   Education Provided Mobility training;Home exercise program   Patient Demonstrates verbal understanding   End of Consult   Patient Position at End of Consult Bedside chair; All needs within reach            Hubert Merchant, PT

## 2022-08-13 NOTE — ASSESSMENT & PLAN NOTE
· MODESTO noted on initial admission to [facility] with elevated Cr 1 64, now resolved  · Baseline Cr being 0 9-1 0  · Continue to monitor closely   Losartan on hold  · Avoid nephrotoxic agents

## 2022-08-13 NOTE — ASSESSMENT & PLAN NOTE
· STEMI 10/22/2021 s/p PATRICA mid circumflex OM1  OM2 was ballooned but not stented    · Pulseless VT 10/27/21 - repeat LHC 90% mid circumflex stenosis, but could not be intervened on  · VT 10/28/21 LHC:  found to have apical LAD complete occlusion was felt to be small to be intervened    · Cardiac carrest 1/1/22 - [facility] felt to be hypoxic vs  VT induced  · On metoprolol 50mg BID, Brilinta 90 mg BID and Lipitor 40 mg qd

## 2022-08-13 NOTE — ASSESSMENT & PLAN NOTE
Lab Results   Component Value Date    HGBA1C 12 7 (H) 05/22/2022       Recent Labs     08/12/22  1123 08/12/22  1603 08/12/22 2054 08/13/22  0552   POCGLU 239* 210* 177* 209*     Uncontrolled per most recent A1c but gradually improving glycemic control  Home regimen: Lantus 16U QHS and Novolog 4U TID  Previous inpatient regimen: Lantus 45U qHS + Humalog 22U TID + SSI 4  Lantus now d/c'd and Humalog changed to 12U TID  Will continue for now and adjust as needed to maintain  - 180

## 2022-08-13 NOTE — ASSESSMENT & PLAN NOTE
· Trach placed in the context of cardiac arrest/prolonged ventilator dependent state November 2021  · Decannulated at [facility] 12/11/21  · Patient requires frequent tracheostomy changes and suctioning  · Per discussion with speech therapy,  video barium swallow was done for sense of food getting stuck   Appropriate for regular diet  · On trach collar O2 with humidification and tolerating well btw 6-8L

## 2022-08-13 NOTE — ASSESSMENT & PLAN NOTE
· Recurrent loculated right pleural effusion  S/P right-sided chest tube insertion and removal on 07/24  · CT of the chest on 7/28/22 revealed moderate partially loculated right pleural effusion  · Right chest tube re-placed on 7/29/22 by IR  · Pleural fluid analysis shows neutrophil predominant WBC count  · Fluid cultures from prior anterior chest tube site positive for MRSA  · 8/1 - CXR: Small component of right pleural effusion suspected which may be partially loculated  Indwelling right thoracostomy tube without pneumothorax  · 8/2 CT Chest:  Decreased size of a right-sided pleural effusion and improved aeration of the right lower lobe post placement of a right pleural drainage catheter, with unchanged appearance of a loculated component of the effusion superomedially  There is slightly  increased density of the effusion compatible with a small amount of blood products  2   Appearance of new inflammatory groundglass opacities in the left lower lobe  3   Mild background interstitial edema is unchanged  · 8/6 - S/P VATS Decortation  procedure  · 8/9 - Apical chest tube removed by thoracic surgery   · 8/11 - patient medically cleared for discharge to [facility]  Awaiting insurance authorization     · 8/11- PICC line placed   Plan:  - Continue Abx w/ Vanc given MRSA, Vanc will need to be continued through 8/20 per ID  - Aggressive pulmonary toilet  - Monitor fever curve and white count closely - patient has remained afebrile with stable white count

## 2022-08-13 NOTE — PHYSICAL THERAPY NOTE
Physical Therapy Progress Note     08/13/22 0900   PT Last Visit   PT Visit Date 08/13/22   Note Type   Note Type Treatment for insurance authorization   Pain Assessment   Pain Assessment Tool 0-10   Pain Score No Pain   Restrictions/Precautions   Other Precautions Fall Risk;O2;Multiple lines;Pain   Subjective   Subjective The pt  notes that she is tired, but she was agreeable to get up when she was notified that her bed was saturated  Bed Mobility   Supine to Sit 5  Supervision   Additional items Increased time required; Bedrails;HOB elevated   Sit to Supine 5  Supervision   Additional items Increased time required;Verbal cues   Transfers   Sit to Stand 4  Minimal assistance   Additional items Assist x 1; Increased time required   Stand to Sit 4  Minimal assistance   Additional items Assist x 1;Verbal cues  (Pt  attempted to quickly sit )   Ambulation/Elevation   Gait pattern Excessively slow; Step to;Short stride; Inconsistent esequiel; Shuffling;Decreased foot clearance   Gait Assistance 4  Minimal assist   Additional items Assist x 1;Verbal cues   Assistive Device Rolling walker   Distance 5 feet, 15 feet x 2, 5 feet  Balance   Static Sitting Fair +   Dynamic Sitting Fair   Static Standing Fair -   Ambulatory Poor +   Activity Tolerance   Activity Tolerance Patient tolerated treatment well;Patient limited by fatigue   Exercises   Balance training  Standing x 14 minutes total time during rests and while being cleaned  Assessment   Prognosis Good   Problem List Decreased strength;Decreased endurance;Decreased mobility;Obesity   Assessment The patient was found sidelying with her head resting against the bed rail and on the very edge of the bed  She initially was reluctant to get up, but when it was noted that she had urinated and saturated the bed to the point that it was pouring onto the floor she was agreeable to get up  She was able to stand while being cleaned, and then she sat in the chair   She was able to ambulate a short distance with standing rest after a few feet  She required increased time for all mobility, and she demonstrates some self-limiting behaviour  The patient was assisted back to the bed after the session with all needs within reach  Barriers to Discharge Inaccessible home environment;Decreased caregiver support   Goals   Patient Goals To rest    STG Expiration Date 08/17/22   PT Treatment Day 5   Plan   Treatment/Interventions Functional transfer training;LE strengthening/ROM; Therapeutic exercise; Endurance training;Patient/family training;Bed mobility;Gait training   Progress Slow progress, decreased activity tolerance   PT Frequency   (3-6x a week )   Recommendation   PT Discharge Recommendation Post acute rehabilitation services   Equipment Recommended [facility] Recommended Wheeled walker   AM-PAC Basic Mobility Inpatient   Turning in Bed Without Bedrails 3   Lying on Back to Sitting on Edge of Flat Bed 3   Moving Bed to Chair 3   Standing Up From Chair 3   Walk in Room 3   Climb 3-5 Stairs 1   Basic Mobility Inpatient Raw Score 16   Basic Mobility Standardized Score 38 32   Highest Level Of Mobility   JH-HLM Goal 5: Stand one or more mins   JH-HLM Achieved 6: Walk 10 steps or more       An AM-PAC Basic Mobility standardized score less than 40 78 suggests the patient may benefit from discharge to post-acute rehab services      Elvin Ayala, PTA

## 2022-08-13 NOTE — OCCUPATIONAL THERAPY NOTE
Occupational Therapy Evaluation     Patient Name: Michelle STEIN Date: 8/13/2022  Problem List  Principal Problem:    Empyema lung, Right  Active Problems:    Anxiety    Diabetic peripheral neuropathy (HCC)    Hyperlipidemia associated with type 2 diabetes mellitus ([facility] [address] )    Hypertension    Type 2 diabetes mellitus with hyperglycemia (HCC)    A-fib ([facility])    CAD (coronary artery disease)    Tracheostomy in place (Former[facility])    Anemia    CKD (chronic kidney disease) stage 3, GFR 30-59 ml/min ([facility])    Acute Respiratory insufficiency on chronic hypoxic respiratory failure    Pain at Chest tube insertion site  Chest wall wound infection    Acute on chronic heart failure with preserved ejection fraction [facility])    Past Medical History  Past Medical History:   Diagnosis Date    Anxiety     Aortic aneurysm ([facility])     Arthritis     Depression     Diabetes mellitus ([facility])     Fibromyalgia     GERD (gastroesophageal reflux disease)     GERD (gastroesophageal reflux disease)     H/O cardiovascular stress test 09/2018    no ischemia  EF 70%  H/O echocardiogram 01/2019    EF 60%  Mild LVH  trivial effusion  Hyperlipidemia     Hypertension     Migraines     Psychiatric disorder     anxiety    Uncontrolled hypertension 2/25/2015    Last Assessment & Plan:  BP today above goal <140/90, apparently asymptomatic  Prior BP elevations were attributed to not taking medication  Consider increased medication if persistent on f/u  Past Surgical History  Past Surgical History:   Procedure Laterality Date    BACK SURGERY      Lumbar epidural steroid injection    CARDIAC CATHETERIZATION N/A 10/22/2021    Procedure: Cardiac pci;  Surgeon: Alejandro Palmer MD;  [facility]; Service: Cardiology    CARDIAC CATHETERIZATION  10/22/2021    Procedure: Cardiac catheterization;  Surgeon: Alejandro Palmer MD;  [facility];   Service: Cardiology    CARDIAC CATHETERIZATION Left 10/27/2021    Procedure: Cardiac Left Heart Cath;  Surgeon: Romelle Gaucher, MD;  [facility]; Service: Cardiology    CARDIAC CATHETERIZATION N/A 10/27/2021    Procedure: Cardiac pci;  Surgeon: Romelle Gaucher, MD;  [facility]; Service: Cardiology    CARDIAC CATHETERIZATION N/A 10/28/2021    Procedure: Cardiac pci;  Surgeon: Yarely Estevez DO;  [facility]; Service: Cardiology    CARPAL TUNNEL RELEASE Left      SECTION      CHOLECYSTECTOMY      COLONOSCOPY      incomplete    COLONOSCOPY      EYE SURGERY      HYSTERECTOMY      Total    IR CHEST TUBE PLACEMENT  2022    IR CHEST TUBE PLACEMENT  2022    IR CHEST TUBE PLACEMENT  2022    LUNG DECORTICATION Right 2022    Procedure: DECORTICATION LUNG;  Surgeon: Funmilayo Flores MD;  Location: [facility];  Service: Thoracic    OVARIAN CYST REMOVAL      PEG W/TRACHEOSTOMY PLACEMENT N/A 2021    Procedure: TRACHEOSTOMY WITH INSERTION PEG TUBE;  Surgeon: Laila Fung DO;  Location: [facility];  Service: General    THORACOSCOPY VIDEO ASSISTED SURGERY (VATS) Right 2022    Procedure: THORACOSCOPY VIDEO ASSISTED SURGERY (VATS), RIGHT;  Surgeon: Funmilayo Flores MD;  [facility];  Service: Thoracic    TUBAL LIGATION      UPPER GASTROINTESTINAL ENDOSCOPY           22 1012   OT Last Visit   OT Visit Date 22   Note Type   Note Type Treatment for insurance authorization   Restrictions/Precautions   Weight Bearing Precautions Per Order No   Other Precautions Multiple lines;Telemetry;O2;Fall Risk  (Trach collar +5L 02)   Lifestyle   Autonomy Pt reports being I with ADLS, requires A with IADLS and uses RW for mobility PTA     Reciprocal Relationships Pt lives with supportive family who are able to assist   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, was a HHA   Intrinsic Gratification Spending time with her son   Pain Assessment   Pain Assessment Tool FLACC   Pain Rating: FLACC (Rest) - Face 0 Pain Rating: FLACC (Rest) - Legs 0   Pain Rating: FLACC (Rest) - Activity 0   Pain Rating: FLACC (Rest) - Cry 0   Pain Rating: FLACC (Rest) - Consolability 0   Score: FLACC (Rest) 0   Pain Rating: FLACC (Activity) - Face 0   Pain Rating: FLACC (Activity) - Legs 0   Pain Rating: FLACC (Activity) - Activity 0   Pain Rating: FLACC (Activity) - Cry 0   Pain Rating: FLACC (Activity) - Consolability 0   Score: FLACC (Activity) 0   ADL   Where Assessed Edge of bed   Grooming Assistance 5  Supervision/Setup   Grooming Deficit Setup; Wash/dry hands; Wash/dry face   UB Bathing Assistance 4  Minimal Assistance   UB Bathing Deficit Right arm;Left arm  (assist for thoroughness -underarms)   LB Bathing Assistance 3  Moderate Assistance   LB Bathing Deficit Right lower leg including foot; Left lower leg including foot;Steadying; Buttocks   UB Dressing Assistance 4  Minimal Assistance   UB Dressing Deficit ([facility])   LB Dressing Assistance 4  Minimal Assistance   LB Dressing Deficit Don/doff R sock; Don/doff L sock;Steadying;Setup  (Supervision to don/doff socks with crossed over leg method and increased time, min a for dynamic standing balance )   Toileting Assistance  4  Minimal Assistance   Toileting Deficit Perineal hygiene   Bed Mobility   Supine to Sit 4  Minimal assistance   Additional items Assist x 1;HOB elevated; Increased time required   Sit to Supine 5  Supervision   Additional items Assist x 1;HOB elevated   Transfers   Sit to Stand 4  Minimal assistance   Additional items Assist x 1   Stand to Sit 4  Minimal assistance   Additional items Assist x 1   Additional Comments +RW with functional transfers   Functional Mobility   Functional Mobility 4  Minimal assistance   Additional Comments min a x 1 with RW short distance functional room mobility (5 steps forward/backward)   Additional items Rolling walker   Cognition   Arousal/Participation Alert; Responsive; Cooperative   Attention Attends with cues to redirect Orientation Level Oriented X4   Memory Decreased recall of precautions;Decreased recall of recent events   Following Commands Follows one step commands with increased time or repetition   Comments pt with flat affect, encouragement with functional tasks, occassional safety cue, communicating with gestures/mouthing words   Activity Tolerance   Activity Tolerance Patient limited by fatigue   Medical Staff Made Aware RN cleared pt for therapy   Assessment   Assessment Patient participated in Skilled OT session this date with interventions consisting of self care tasks, functional transfers/mobility   Patient agreeable to OT treatment session, upon arrival patient was found supine in bed  In comparison to previous session, patient with improvements in bed mobility   Patient requiring verbal cues for safety, verbal cues for correct technique and frequent rest periods  Patient continues to be functioning below baseline level, occupational performance remains limited secondary to factors listed above and increased risk for falls and injury  The patient's raw score on the AM-PAC Daily Activity inpatient short form is 18, standardized score is 38 66, less than 39 4  Patients at this level are likely to benefit from discharge to post-acute rehabilitation services  Please refer to the recommendation of the Occupational Therapist for safe discharge planning  From OT standpoint, recommendation at time of d/c would be Short Term Rehab  Patient to benefit from continued Occupational Therapy treatment while in the hospital to address deficits as defined above and maximize level of functional independence with ADLs and functional mobility  Plan   Treatment Interventions ADL retraining;Functional transfer training;UE strengthening/ROM; Endurance training;Cognitive reorientation;Patient/family training;Equipment evaluation/education; Compensatory technique education; Energy conservation; Activityengagement   Goal Expiration Date 08/22/22   OT Treatment Day 2   OT Frequency 3-5x/wk   Recommendation   OT Discharge Recommendation Post acute rehabilitation services   AM-PAC Daily Activity Inpatient   Lower Body Dressing 2   Bathing 3   Toileting 3   Upper Body Dressing 3   Grooming 3   Eating 4   Daily Activity Raw Score 18   Daily Activity Standardized Score (Calc for Raw Score >=11) 38 66   AM-PAC Applied Cognition Inpatient   Following a Speech/Presentation 2   Understanding Ordinary Conversation 4   Taking Medications 3   Remembering Where Things Are Placed or Put Away 3   Remembering List of 4-5 Errands 3   Taking Care of Complicated Tasks 2   Applied Cognition Raw Score 17   Applied Cognition Standardized Score 36 52     Clau Neumann MOT, OTR/L

## 2022-08-13 NOTE — ASSESSMENT & PLAN NOTE
· Purulent discharge noted at site of recent chest tube placement  · Cultures positive for MRSA on 07/28 susceptible to vanc  · Currently on IV vancomycin - will continue thru 8/20 per ID  · Continue local wound care/dressing changes

## 2022-08-13 NOTE — PLAN OF CARE
Problem: PHYSICAL THERAPY ADULT  Goal: Performs mobility at highest level of function for planned discharge setting  See evaluation for individualized goals  Description: Treatment/Interventions: Functional transfer training, LE strengthening/ROM, Therapeutic exercise, Endurance training, Equipment eval/education, Bed mobility, Gait training, Spoke to nursing  Equipment Recommended: Lucius Michael       See flowsheet documentation for full assessment, interventions and recommendations  Outcome: Progressing  Note: Prognosis: Good  Problem List: Decreased strength, Decreased endurance, Impaired balance, Decreased mobility, Impaired judgement, Decreased safety awareness, Decreased skin integrity, Pain  Assessment: seen for skilled pT for tx session as documented above  Pt remains limited by faituge; SOB and CONNOLLY w/ stable Spo2 and HR  Pt requiring inc time; frequent rest breaks throughout tx session  ambualtion distacnes limited to very short household distances at this time w/ RW  Due to deficits noted PT cont to recommend inpt rehab on d/c to maximize functional recovery and prevent hospital readmission  PT will continue to follow and progress toward goals  Barriers to Discharge: Inaccessible home environment     PT Discharge Recommendation: Post acute rehabilitation services    See flowsheet documentation for full assessment

## 2022-08-13 NOTE — PROGRESS NOTES
Progress Note - Infectious Disease   Kelli Red 65 y o  female MRN: [id number]  Unit/Bed#: [facility] 429-01 Encounter: [id number]      Impression/Recommendations:  1  Probable right-sided empyema   CT show loculated right pleural effusion   Patient is status post placement of chest tube on 07/29, with pleural fluid parameters consistent with empyema   Culture has no growth, likely due to prior antibiotic   Given MRSA in right chest wound, MRSA is likely pathogen in empyema   Despite chest tube drainage, repeat chest CT on 08/02 showed persistent and unchanged loculated empyema   Patient is now status post VATS/decortication   Operative culture with no growth, likely due to prolonged antibiotic prior to surgery   Chest tube was removed  Patient is clinically much improved  Continue IV vancomycin  Monitor temperature/WBC  Follow-up final operative culture  Treat x 2 weeks after VATS, through 8/20      2  Developing sepsis, with leukocytosis and tachypnea, likely secondary to empyema above   Patient is clinically and systemically improved   WBC has normalized   Tachypnea resolved  Lajean Cassette Antibiotic plan as in below  Monitor temperature/WBC  Monitor hemodynamics      3  MRSA right chest wall infection, as site recent/prior chest tube placement for spontaneous pneumothorax  Antibiotic plan as in above  Serial exams      4  Acute on chronic hypoxic respiratory failure, likely multifactorial   Respiratory culture with MRSA and Pseudomonas but no obvious pneumonia   These isolates are most likely airway colonization  Management per primary service      5  Recent spontaneous pneumothorax, status post chest tube placement on 07/20   This was removed on 07/24      6  DM, type 2, poorly controlled, with hyperglycemia and elevated hemoglobin A1c   This is risk factor for infection above  Management per primary service      7  MODESTO   Creatinine up and down, overall stable  Antibiotic dosages adjusted accordingly    Monitor creatinine      Discussed with patient in detail regarding the above plan  Plan for discharge to rehabilitation noted  Okay for discharge any time, from ID viewpoint      Antibiotics:  Vancomycin # 18  POD # 7     Subjective:  Patient's right-sided chest pain stable, mild and controlled  Temperature stays down   No chills  She is tolerating antibiotic well   No nausea, vomiting or diarrhea      Objective:  Vitals:  Temp:  [98 1 °F (36 7 °C)-98 6 °F (37 °C)] 98 1 °F (36 7 °C)  HR:  [61-69] 62  Resp:  [18] 18  BP: (115-145)/(66-88) 145/88  SpO2:  [97 %-100 %] 100 %  Temp (24hrs), Av 4 °F (36 9 °C), Min:98 1 °F (36 7 °C), Max:98 6 °F (37 °C)  Current: Temperature: 98 1 °F (36 7 °C)    Physical Exam:     General: Awake, alert, cooperative, no distress  Neck:  Supple  No mass  No lymphadenopathy  Lungs: Decreased breath sounds on right, mild right basilar rhonchi and rales, no wheezing, respirations unlabored  Heart:  Regular rate and rhythm, S1 and S2 normal, no murmur  Abdomen: Soft, nondistended, non-tender, bowel sounds active all four quadrants, no masses, no organomegaly  Extremities: Stable mild leg edema  No erythema/warmth  No ulcer  Nontender to palpation  Skin:  No rash  Neuro: Moves all extremities  Invasive Devices  Report    Peripherally Inserted Central Catheter Line  Duration           PICC Line 22 Right Brachial 2 days          Drain  Duration           External Urinary Catheter <1 day          Airway  Duration           Surgical Airway Shiley Fenestrated 164 days                Labs studies:   I have personally reviewed pertinent labs    Results from last 7 days   Lab Units 22  0557 22  0442 22  0523 22  0435 22  1856   POTASSIUM mmol/L 4 1 3 9 4 4   < > 4 4   CHLORIDE mmol/L 99 102 102   < > 102   CO2 mmol/L 31 31 29   < > 28   BUN mg/dL 37* 32* 34*   < > 43*   CREATININE mg/dL 1 71* 1 48* 1 44*   < > 1 39*   EGFR ml/min/1 73sq m 33 39 40 < > 42   CALCIUM mg/dL 8 9 8 9 8 8   < > 7 7*   AST U/L  --   --   --   --  19   ALT U/L  --   --   --   --  11*   ALK PHOS U/L  --   --   --   --  56    < > = values in this interval not displayed  Results from last 7 days   Lab Units 08/11/22  0523 08/10/22  0406 08/09/22  0840   WBC Thousand/uL 7 94 9 24 8 92   HEMOGLOBIN g/dL 10 2* 9 7* 9 1*   PLATELETS Thousands/uL 484* 514* 446*           Imaging Studies:   I have personally reviewed pertinent imaging study reports and images in PACS  EKG, Pathology, and Other Studies:   I have personally reviewed pertinent reports

## 2022-08-13 NOTE — PLAN OF CARE
Problem: OCCUPATIONAL THERAPY ADULT  Goal: Performs self-care activities at highest level of function for planned discharge setting  See evaluation for individualized goals  Description: Treatment Interventions: ADL retraining, Functional transfer training, UE strengthening/ROM, Endurance training, Patient/family training, Compensatory technique education, Continued evaluation, Energy conservation, Activityengagement          See flowsheet documentation for full assessment, interventions and recommendations  Note: Limitation: Decreased ADL status, Decreased endurance, Decreased self-care trans, Decreased high-level ADLs  Prognosis: Good  Assessment: Patient participated in Skilled OT session this date with interventions consisting of self care tasks, functional transfers/mobility   Patient agreeable to OT treatment session, upon arrival patient was found supine in bed  In comparison to previous session, patient with improvements in bed mobility   Patient requiring verbal cues for safety, verbal cues for correct technique and frequent rest periods  Patient continues to be functioning below baseline level, occupational performance remains limited secondary to factors listed above and increased risk for falls and injury  The patient's raw score on the AM-PAC Daily Activity inpatient short form is 18, standardized score is 38 66, less than 39 4  Patients at this level are likely to benefit from discharge to post-acute rehabilitation services  Please refer to the recommendation of the Occupational Therapist for safe discharge planning  From OT standpoint, recommendation at time of d/c would be Short Term Rehab  Patient to benefit from continued Occupational Therapy treatment while in the hospital to address deficits as defined above and maximize level of functional independence with ADLs and functional mobility       OT Discharge Recommendation: Post acute rehabilitation services

## 2022-08-13 NOTE — PROGRESS NOTES
Vancomycin Assessment    Kelli Nowak is a 65 y o  female who is currently receiving vancomycin 1000mg IVq24h for other Empyema   Relevant clinical data and objective history reviewed:  Creatinine   Date Value Ref Range Status   08/13/2022 1 71 (H) 0 60 - 1 30 mg/dL Final     Comment:     Standardized to IDMS reference method   08/12/2022 1 48 (H) 0 60 - 1 30 mg/dL Final     Comment:     Standardized to IDMS reference method   08/11/2022 1 44 (H) 0 60 - 1 30 mg/dL Final     Comment:     Standardized to IDMS reference method   07/19/2015 0 71 0 60 - 1 30 mg/dL Final     Comment:     Standardized to IDMS reference method   07/08/2015 0 59 (L) 0 60 - 1 30 mg/dL Final     Comment:     Standardized to IDMS reference method   03/28/2015 0 44 (L) 0 60 - 1 30 mg/dL Final     Comment:     Standardized to IDMS reference method     Vancomycin Rm   Date Value Ref Range Status   08/02/2022 18 8 10 0 - 20 0 ug/mL Final     /75 (BP Location: Left arm)   Pulse 68   Temp 98 5 °F (36 9 °C) (Oral)   Resp 18   Wt 78 7 kg (173 lb 8 oz)   SpO2 100%   BMI 25 62 kg/m²   I/O last 3 completed shifts: In: 600 [P O :600]  Out: 2150 [Urine:2150]  Lab Results   Component Value Date/Time    BUN 37 (H) 08/13/2022 05:57 AM    BUN 16 07/19/2015 03:40 AM    WBC 7 94 08/11/2022 05:23 AM    WBC 10 66 (H) 07/19/2015 03:40 AM    HGB 10 2 (L) 08/11/2022 05:23 AM    HGB 13 3 07/19/2015 03:40 AM    HCT 34 9 08/11/2022 05:23 AM    HCT 40 2 07/19/2015 03:40 AM    MCV 88 08/11/2022 05:23 AM    MCV 84 07/19/2015 03:40 AM     (H) 08/11/2022 05:23 AM     07/19/2015 03:40 AM     Temp Readings from Last 3 Encounters:   08/13/22 98 5 °F (36 9 °C) (Oral)   08/02/22 97 5 °F (36 4 °C)   07/19/22 98 °F (36 7 °C) (Tympanic)     Vancomycin Days of Therapy: 13    Assessment/Plan  The patient is currently on vancomycin utilizing scheduled dosing    Baseline risks associated with therapy include: pre-existing renal impairment and concomitant nephrotoxic medications (baseline Scr ranges between 1 07-1 71 has been in this range during this stay)  If kidney function worsens may consider pulse dosing  The patient is receiving 1000mg IVq24h with the most recent vancomycin level being at steady-state and supratherapeutic based on a goal of 15-20 (appropriate for most indications) ; therefore, after clinical evaluation will be changed to 750mg IV q24h   Pharmacy will continue to follow closely for s/sx of nephrotoxicity, infusion reactions, and appropriateness of therapy  BMP and CBC will be ordered per protocol  Plan for trough as patient approaches steady state, prior to the 3rd  dose at approximately 1330 on 8/16  Pharmacy will continue to follow the patients culture results and clinical progress daily      Love Khan, Pharmacist

## 2022-08-13 NOTE — PROGRESS NOTES
[facility]  Progress Note - Cynthia Hermosillo 1965, 65 y o  female MRN: [id number]  Unit/Bed#: [facility] 429-01 Encounter: [id number]  Primary Care Provider: Jesus Prince MD   Date and time admitted to hospital: 8/2/2022  8:31 PM    * Empyema lung, Right  Assessment & Plan  · Recurrent loculated right pleural effusion  S/P right-sided chest tube insertion and removal on 07/24  · CT of the chest on 7/28/22 revealed moderate partially loculated right pleural effusion  · Right chest tube re-placed on 7/29/22 by IR  · Pleural fluid analysis shows neutrophil predominant WBC count  · Fluid cultures from prior anterior chest tube site positive for MRSA  · 8/1 - CXR: Small component of right pleural effusion suspected which may be partially loculated  Indwelling right thoracostomy tube without pneumothorax  · 8/2 CT Chest:  Decreased size of a right-sided pleural effusion and improved aeration of the right lower lobe post placement of a right pleural drainage catheter, with unchanged appearance of a loculated component of the effusion superomedially  There is slightly  increased density of the effusion compatible with a small amount of blood products  2   Appearance of new inflammatory groundglass opacities in the left lower lobe  3   Mild background interstitial edema is unchanged  · 8/6 - S/P VATS Decortation  procedure  · 8/9 - Apical chest tube removed by thoracic surgery   · 8/11 - patient medically cleared for discharge to [facility]  Awaiting insurance authorization     · 8/11- PICC line placed   Plan:  - Continue Abx w/ Vanc given MRSA, Vanc will need to be continued through 8/20 per ID  - Aggressive pulmonary toilet  - Monitor fever curve and white count closely - patient has remained afebrile with stable white count    Acute on chronic heart failure with preserved ejection fraction [facility])  Assessment & Plan  Wt Readings from Last 3 Encounters:   08/13/22 78 7 kg (173 lb 8 oz) 08/02/22 77 7 kg (171 lb 6 4 oz)   07/19/22 78 2 kg (172 lb 6 4 oz)     · [facility] admitted to [facility] on 07[facility]15 with CHF exacerbation, now optimized  · Most recent EF 50% with normal systolic and diastolic function on 0/85/2831  Currently stable and euvolemic  · On Bumex 2 mg daily with Aldactone 100 mg daily - restarted on 7/29  · Continue low-salt diet, strict I's and O's monitoring  · Per Cardiology at [facility] upon discharge  · Will need follow-up with General Cardiology & EP service upon discharge for ICD consideration    Chest wall wound infection  Assessment & Plan  · Purulent discharge noted at site of recent chest tube placement  · Cultures positive for MRSA on 07/28 susceptible to vanc  · Currently on IV vancomycin - will continue thru 8/20 per ID  · Continue local wound care/dressing changes     Pain at Chest tube insertion site  Assessment & Plan  · Continues with intractable pain at prior chest tube site on right anterior chest wall  This was removed 7/24/22  · Was followed by APS back in [place], S/P epidural catheter which was removed 8/2  · Current pain regimen: Diaz Tylenol 975 Q8h, PO Dilaudid 2/4 for mod/severe, IV Dilaudid 0 5 mg Q2  · Bowel regimen:  Scheduled senna and MiraLax daily  · Both chest tubes now removed - last chest tube removed on 8/10 by CTS     Acute Respiratory insufficiency on chronic hypoxic respiratory failure  Assessment & Plan  · She is status post trach   Currently on 6-8L with sats in the high 90s, at home uses 10 L  · Status post recent right pneumothorax requiring chest tube and now with right empyema  · Continue aggressive respiratory protocol, p r n  suctioning  · Continue Xoponex and Atrovent per Pulm q6hrs  · Encourage out of bed, incentive spirometry  · Pulmonology following      CKD (chronic kidney disease) stage 3, GFR 30-59 ml/min ([facility])  Assessment & Plan  · MODESTO noted on initial admission to [facility] with elevated Cr 1 64, now resolved  · Baseline Cr being 0 9-1 0  · Continue to monitor closely  Losartan on hold  · Avoid nephrotoxic agents     Anemia  Assessment & Plan    Results from last 7 days   Lab Units 08/11/22  0523 08/10/22  0406 08/09/22  0840 08/08/22  0435 08/07/22  1856 08/07/22  1423 08/07/22  0311   HEMOGLOBIN g/dL 10 2* 9 7* 9 1* 8 4* 7 7* 8 2* 7 7*   HEMATOCRIT % 34 9 30 5* 29 0* 26 1* 24 8* 26 9* 25 1*     Most likely anemia of chronic disease due to prolonged illness  No active bleeding noted  Hg (8/4): 6 8 s/p  5 unit PRBC's since this admission  Hb stable, monitor closely  Consider transfusing if < 8 due to history of CAD  Continue Iron replacement as well as daily miralax     Tracheostomy in place [facility])  Assessment & Plan  · Trach placed in the context of cardiac arrest/prolonged ventilator dependent state November 2021  · Decannulated at [facility] 12/11/21  · Patient requires frequent tracheostomy changes and suctioning  · Per discussion with speech therapy,  video barium swallow was done for sense of food getting stuck  Appropriate for regular diet  · On trach collar O2 with humidification and tolerating well btw 6-8L     CAD (coronary artery disease)  Assessment & Plan  · STEMI 10/22/2021 s/p PATRICA mid circumflex OM1  OM2 was ballooned but not stented    · Pulseless VT 10/27/21 - repeat LHC 90% mid circumflex stenosis, but could not be intervened on  · VT 10/28/21 LHC:  found to have apical LAD complete occlusion was felt to be small to be intervened    · Cardiac carrest 1/1/22 - NO cardiac cath at [facility] felt to be hypoxic vs  VT induced  · On metoprolol 50mg BID, Brilinta 90 mg BID and Lipitor 40 mg qd     A-fib [facility])  Assessment & Plan  Follows closely with Cardiology  Rhythm controlled with amiodarone 100 mg daily and AC with Eliquis 5 mg BID    Type 2 diabetes mellitus with hyperglycemia [facility])  Assessment & Plan  Lab Results   Component Value Date    HGBA1C 12 7 (H) 05/22/2022       Recent Labs 08/12/22  1123 08/12/22  1603 08/12/22 2054 08/13/22  0552   POCGLU 239* 210* 177* 209*     Uncontrolled per most recent A1c but gradually improving glycemic control  Home regimen: Lantus 16U QHS and Novolog 4U TID  Previous inpatient regimen: Lantus 45U qHS + Humalog 22U TID + SSI 4  Lantus now d/c'd and Humalog changed to 12U TID  Will continue for now and adjust as needed to maintain  - 180    Hypertension  Assessment & Plan  BP stable  On home losartan 50 mg BID, follows closely with Cardiology  Losartan on hold but can consider restarting as MODESTO has now resolved  Monitor BP closely     Hyperlipidemia associated with type 2 diabetes mellitus (HCC)  Assessment & Plan  - Continue Atorvastatin 40 mg daily  Diabetic peripheral neuropathy (HCC)  Assessment & Plan  Continue PTA Lyrica as documented     Anxiety  Assessment & Plan  On Lexapro 10 mg daily as well as Atarax prn             Subjective/Objective     Subjective:   Patient seen and examined at bedside  No overnight events  She still complains of minimal pain around chest tube site  No signs of infection noted  Otherwise patient is stable  Objective:  Vitals: Blood pressure 145/88, pulse 62, temperature 98 1 °F (36 7 °C), temperature source Oral, resp  rate 18, weight 78 7 kg (173 lb 8 oz), SpO2 100 %  ,Body mass index is 25 62 kg/m²  Intake/Output Summary (Last 24 hours) at 8/13/2022 0849  Last data filed at 8/12/2022 2300  Gross per 24 hour   Intake 240 ml   Output 1200 ml   Net -960 ml       Invasive Devices  Report    Peripherally Inserted Central Catheter Line  Duration           PICC Line 08/11/22 Right Brachial 1 day          Drain  Duration           External Urinary Catheter <1 day          Airway  Duration           Surgical Airway Shiley Fenestrated 164 days                Physical Exam  Vitals reviewed  Constitutional:       General: She is not in acute distress      Appearance: She is not ill-appearing or diaphoretic     Comments: Trach collar in place   HENT:      Head: Normocephalic and atraumatic       Right Ear: External ear normal       Left Ear: External ear normal       Mouth/Throat:      Mouth: Mucous membranes are moist    Eyes:      Conjunctiva/sclera: Conjunctivae normal    Cardiovascular:      Rate and Rhythm: Normal rate and regular rhythm       Heart sounds: Normal heart sounds  No murmur heard  Pulmonary:      Effort: Pulmonary effort is normal  No respiratory distress       Breath sounds: Normal breath sounds       Comments:  Chest Tube site clean and dry  No erythema or drainage  Musculoskeletal:         General: No swelling, tenderness, deformity or signs of injury       Cervical back: Normal range of motion       Right lower leg: No edema       Left lower leg: No edema  Skin:     General: Skin is warm       Coloration: Skin is not pale       Findings: No erythema or rash  Neurological:      General: No focal deficit present       Mental Status: She is alert and oriented to person, place, and time  Psychiatric:         Behavior: Behavior normal      Lab, Imaging and other studies: I have personally reviewed pertinent reports       Results from last 7 days   Lab Units 08/11/22  0523 08/10/22  0406 08/09/22  0840   WBC Thousand/uL 7 94 9 24 8 92   HEMOGLOBIN g/dL 10 2* 9 7* 9 1*   HEMATOCRIT % 34 9 30 5* 29 0*   PLATELETS Thousands/uL 484* 514* 446*   NEUTROS PCT % 74 74 76*   MONOS PCT % 9 10 9     Results from last 7 days   Lab Units 08/13/22  0557 08/12/22  0442 08/11/22  0523 08/08/22  0435 08/07/22  1856 08/06/22  1220 08/06/22  1118 08/06/22  1058 08/06/22  0951   POTASSIUM mmol/L 4 1 3 9 4 4   < > 4 4   < >  --   --   --    CHLORIDE mmol/L 99 102 102   < > 102   < >  --   --   --    CO2 mmol/L 31 31 29   < > 28   < >  --   --   --    CO2, I-STAT mmol/L  --   --   --   --   --   --  26 25 30   BUN mg/dL 37* 32* 34*   < > 43*   < >  --   --   --    CREATININE mg/dL 1 71* 1 48* 1 44*   < > 1 39*   < > --   --   --    CALCIUM mg/dL 8 9 8 9 8 8   < > 7 7*   < >  --   --   --    ALK PHOS U/L  --   --   --   --  56  --   --   --   --    ALT U/L  --   --   --   --  11*  --   --   --   --    AST U/L  --   --   --   --  19  --   --   --   --    GLUCOSE, ISTAT mg/dl  --   --   --   --   --   --  220* 201* 224*    < > = values in this interval not displayed       Results from last 7 days   Lab Units 08/07/22  0311 08/06/22  1220   MAGNESIUM mg/dL 2 1 1 6     Lab Results   Component Value Date    PHOS 4 6 (H) 07/18/2022    PHOS 3 9 07/17/2022    PHOS 4 2 07/16/2022          Results from last 7 days   Lab Units 08/07/22  1856   LACTIC ACID mmol/L 0 7     0   Lab Value Date/Time    TROPONINI >40 00 (H) 10/24/2021 0839    TROPONINI >40 00 (H) 10/22/2021 1851    TROPONINI >40 00 (H) 10/22/2021 1442    TROPONINI <0 03 08/26/2021 0802    TROPONINI <0 03 04/18/2021 1413    TROPONINI <0 02 11/17/2020 1537    TROPONINI <0 03 07/26/2020 1821    TROPONINI <0 02 06/02/2020 2250    TROPONINI <0 02 06/02/2020 1952    TROPONINI <0 02 03/04/2019 2122    TROPONINI <0 02 10/07/2018 1516    TROPONINI <0 02 08/12/2018 0042    TROPONINI <0 02 08/11/2018 2058    TROPONINI <0 02 08/11/2018 1543    TROPONINI <0 02 08/10/2018 1912     ABG:  Lab Results   Component Value Date    PHART 7 378 08/07/2022    NHH2STB 46 0 (H) 08/07/2022    PO2ART 70 3 (L) 08/07/2022    ZFG6WHN 26 5 08/07/2022    BEART 1 1 08/07/2022    SOURCE Line, Arterial 08/07/2022     VTE Pharmacologic Prophylaxis: Reason for no pharmacologic prophylaxis Pt is on Eliquis     VTE Mechanical Prophylaxis: sequential compression device

## 2022-08-13 NOTE — PLAN OF CARE
Problem: PHYSICAL THERAPY ADULT  Goal: Performs mobility at highest level of function for planned discharge setting  See evaluation for individualized goals  Description: Treatment/Interventions: Functional transfer training, LE strengthening/ROM, Therapeutic exercise, Endurance training, Equipment eval/education, Bed mobility, Gait training, Spoke to nursing  Equipment Recommended: Anai Bueno       See flowsheet documentation for full assessment, interventions and recommendations  Outcome: Progressing  Note: Prognosis: Good  Problem List: Decreased strength, Decreased endurance, Impaired balance, Decreased mobility, Impaired judgement, Decreased safety awareness, Decreased skin integrity, Pain  Assessment: seen for skilled pT for tx session as documented above  Pt remains limited by faituge; SOB and CONNOLLY w/ stable Spo2 and HR  Pt requiring inc time; frequent rest breaks throughout tx session  ambualtion distacnes limited to very short household distances at this time w/ RW  Due to deficits noted PT cont to recommend inpt rehab on d/c to maximize functional recovery and prevent hospital readmission  PT will continue to follow and progress toward goals  Barriers to Discharge: Inaccessible home environment     PT Discharge Recommendation: Post acute rehabilitation services    See flowsheet documentation for full assessment

## 2022-08-14 LAB
ANION GAP SERPL CALCULATED.3IONS-SCNC: 2 MMOL/L (ref 4–13)
BUN SERPL-MCNC: 40 MG/DL (ref 5–25)
CALCIUM SERPL-MCNC: 8.4 MG/DL (ref 8.3–10.1)
CHLORIDE SERPL-SCNC: 99 MMOL/L (ref 96–108)
CO2 SERPL-SCNC: 34 MMOL/L (ref 21–32)
CREAT SERPL-MCNC: 1.73 MG/DL (ref 0.6–1.3)
GFR SERPL CREATININE-BSD FRML MDRD: 32 ML/MIN/1.73SQ M
GLUCOSE SERPL-MCNC: 178 MG/DL (ref 65–140)
GLUCOSE SERPL-MCNC: 189 MG/DL (ref 65–140)
GLUCOSE SERPL-MCNC: 200 MG/DL (ref 65–140)
POTASSIUM SERPL-SCNC: 4.6 MMOL/L (ref 3.5–5.3)
SODIUM SERPL-SCNC: 135 MMOL/L (ref 135–147)

## 2022-08-14 PROCEDURE — 82948 REAGENT STRIP/BLOOD GLUCOSE: CPT

## 2022-08-14 PROCEDURE — 99232 SBSQ HOSP IP/OBS MODERATE 35: CPT | Performed by: INTERNAL MEDICINE

## 2022-08-14 PROCEDURE — 80048 BASIC METABOLIC PNL TOTAL CA: CPT | Performed by: FAMILY MEDICINE

## 2022-08-14 PROCEDURE — 99232 SBSQ HOSP IP/OBS MODERATE 35: CPT | Performed by: FAMILY MEDICINE

## 2022-08-14 PROCEDURE — 94760 N-INVAS EAR/PLS OXIMETRY 1: CPT

## 2022-08-14 RX ORDER — ACETAMINOPHEN 325 MG/1
975 TABLET ORAL EVERY 8 HOURS SCHEDULED
Status: DISCONTINUED | OUTPATIENT
Start: 2022-08-14 | End: 2022-08-18 | Stop reason: HOSPADM

## 2022-08-14 RX ORDER — INSULIN GLARGINE 100 [IU]/ML
20 INJECTION, SOLUTION SUBCUTANEOUS
Status: DISCONTINUED | OUTPATIENT
Start: 2022-08-14 | End: 2022-08-17

## 2022-08-14 RX ADMIN — METOPROLOL SUCCINATE 50 MG: 50 TABLET, EXTENDED RELEASE ORAL at 06:34

## 2022-08-14 RX ADMIN — APIXABAN 5 MG: 5 TABLET, FILM COATED ORAL at 17:34

## 2022-08-14 RX ADMIN — HYDROMORPHONE HYDROCHLORIDE 2 MG: 2 TABLET ORAL at 20:07

## 2022-08-14 RX ADMIN — INSULIN LISPRO 4 UNITS: 100 INJECTION, SOLUTION INTRAVENOUS; SUBCUTANEOUS at 06:33

## 2022-08-14 RX ADMIN — PANTOPRAZOLE SODIUM 40 MG: 40 TABLET, DELAYED RELEASE ORAL at 06:34

## 2022-08-14 RX ADMIN — Medication: at 17:36

## 2022-08-14 RX ADMIN — Medication: at 08:41

## 2022-08-14 RX ADMIN — ATORVASTATIN CALCIUM 40 MG: 40 TABLET, FILM COATED ORAL at 16:16

## 2022-08-14 RX ADMIN — PREGABALIN 25 MG: 25 CAPSULE ORAL at 17:34

## 2022-08-14 RX ADMIN — MIRTAZAPINE 7.5 MG: 15 TABLET, FILM COATED ORAL at 21:31

## 2022-08-14 RX ADMIN — INSULIN GLARGINE 20 UNITS: 100 INJECTION, SOLUTION SUBCUTANEOUS at 21:30

## 2022-08-14 RX ADMIN — LIDOCAINE 5% 1 PATCH: 700 PATCH TOPICAL at 08:37

## 2022-08-14 RX ADMIN — PREGABALIN 75 MG: 75 CAPSULE ORAL at 08:32

## 2022-08-14 RX ADMIN — FERROUS SULFATE TAB 325 MG (65 MG ELEMENTAL FE) 325 MG: 325 (65 FE) TAB at 08:30

## 2022-08-14 RX ADMIN — INSULIN LISPRO 2 UNITS: 100 INJECTION, SOLUTION INTRAVENOUS; SUBCUTANEOUS at 11:03

## 2022-08-14 RX ADMIN — ESCITALOPRAM OXALATE 10 MG: 10 TABLET ORAL at 08:32

## 2022-08-14 RX ADMIN — APIXABAN 5 MG: 5 TABLET, FILM COATED ORAL at 08:32

## 2022-08-14 RX ADMIN — INSULIN LISPRO 4 UNITS: 100 INJECTION, SOLUTION INTRAVENOUS; SUBCUTANEOUS at 21:30

## 2022-08-14 RX ADMIN — AMIODARONE HYDROCHLORIDE 100 MG: 200 TABLET ORAL at 08:30

## 2022-08-14 RX ADMIN — TICAGRELOR 90 MG: 90 TABLET ORAL at 21:30

## 2022-08-14 RX ADMIN — INSULIN LISPRO 4 UNITS: 100 INJECTION, SOLUTION INTRAVENOUS; SUBCUTANEOUS at 16:26

## 2022-08-14 RX ADMIN — SPIRONOLACTONE 100 MG: 50 TABLET ORAL at 08:32

## 2022-08-14 RX ADMIN — TICAGRELOR 90 MG: 90 TABLET ORAL at 08:32

## 2022-08-14 RX ADMIN — INSULIN LISPRO 12 UNITS: 100 INJECTION, SOLUTION INTRAVENOUS; SUBCUTANEOUS at 08:30

## 2022-08-14 RX ADMIN — METOPROLOL SUCCINATE 50 MG: 50 TABLET, EXTENDED RELEASE ORAL at 17:34

## 2022-08-14 NOTE — ASSESSMENT & PLAN NOTE
Wt Readings from Last 3 Encounters:   08/13/22 78 7 kg (173 lb 8 oz)   08/02/22 77 7 kg (171 lb 6 4 oz)   07/19/22 78 2 kg (172 lb 6 4 oz)     · [facility] admitted to [facility] on 07/15 with CHF exacerbation, now optimized  · Most recent EF 50% with normal systolic and diastolic function on 4/97/1833  Currently stable and euvolemic  · On Bumex 2 mg daily with Aldactone 100 mg daily - restarted on 7/29, Bumex d/c'd 8/13  · Continue low-salt diet, strict I's and O's monitoring  · Per Cardiology at [facility] upon discharge    · Will need follow-up with General Cardiology & EP service upon discharge for ICD consideration

## 2022-08-14 NOTE — ASSESSMENT & PLAN NOTE
· Recurrent loculated right pleural effusion  S/P right-sided chest tube insertion and removal on 07/24  · CT of the chest on 7/28/22 revealed moderate partially loculated right pleural effusion  · Right chest tube re-placed on 7/29/22 by IR  · Pleural fluid analysis shows neutrophil predominant WBC count  · Fluid cultures from prior anterior chest tube site positive for MRSA  · 8/1 - CXR: Small component of right pleural effusion suspected which may be partially loculated  Indwelling right thoracostomy tube without pneumothorax  · 8/2 CT Chest:  Decreased size of a right-sided pleural effusion and improved aeration of the right lower lobe post placement of a right pleural drainage catheter, with unchanged appearance of a loculated component of the effusion superomedially  There is slightly  increased density of the effusion compatible with a small amount of blood products  2   Appearance of new inflammatory groundglass opacities in the left lower lobe  3   Mild background interstitial edema is unchanged  · 8/6 - S/P VATS Decortation  procedure  · 8/9 - Apical chest tube removed by thoracic surgery   · 8/11 - patient medically cleared for discharge to [facility]  Awaiting insurance authorization     · 8/11- PICC line placed   Plan:  - Continue Abx w/ Vanc given MRSA, Vanc will need to be continued through 8/20 per ID  - Aggressive pulmonary toilet  - Monitor fever curve and white count closely - patient has remained afebrile with stable white count

## 2022-08-14 NOTE — ASSESSMENT & PLAN NOTE
Results from last 7 days   Lab Units 08/11/22  0523 08/10/22  0406 08/09/22  0840 08/08/22  0435 08/07/22  1856 08/07/22  1423   HEMOGLOBIN g/dL 10 2* 9 7* 9 1* 8 4* 7 7* 8 2*   HEMATOCRIT % 34 9 30 5* 29 0* 26 1* 24 8* 26 9*     Most likely anemia of chronic disease due to prolonged illness  No active bleeding noted - Hb is stable  Hg (8/4): 6 8 s/p  5 unit PRBC's since this admission  Hb stable, monitor closely  Consider transfusing if < 8 due to history of CAD  Continue Iron replacement as well as daily miralax

## 2022-08-14 NOTE — PROGRESS NOTES
Progress Note - Infectious Disease   Kelli Red 65 y o  female MRN: [id number]  Unit/Bed#: [facility] 429-01 Encounter: [id number]      Impression/Recommendations:  1  Probable right-sided empyema   CT show loculated right pleural effusion   Patient is status post placement of chest tube on 07/29, with pleural fluid parameters consistent with empyema   Culture has no growth, likely due to prior antibiotic   Given MRSA in right chest wound, MRSA is likely pathogen in empyema   Despite chest tube drainage, repeat chest CT on 08/02 showed persistent and unchanged loculated empyema   Patient is now status post VATS/decortication   Operative culture with no growth, likely due to prolonged antibiotic prior to surgery   Chest tube was removed   Patient is clinically much improved  Continue IV vancomycin  Monitor temperature/WBC  Follow-up final operative culture  Treat x 2 weeks after VATS, through 8/20      2  Developing sepsis, with leukocytosis and tachypnea, likely secondary to empyema above   Patient is clinically and systemically improved   WBC has normalized   Tachypnea resolved  William Vargas Antibiotic plan as in below  Monitor temperature/WBC  Monitor hemodynamics      3  MRSA right chest wall infection, as site recent/prior chest tube placement for spontaneous pneumothorax  Antibiotic plan as in above  Serial exams      4  Acute on chronic hypoxic respiratory failure, likely multifactorial   Respiratory culture with MRSA and Pseudomonas but no obvious pneumonia   These isolates are most likely airway colonization  Management per primary service      5  Recent spontaneous pneumothorax, status post chest tube placement on 07/20   This was removed on 07/24      6  DM, type 2, poorly controlled, with hyperglycemia and elevated hemoglobin A1c   This is risk factor for infection above  Management per primary service      7  MODESTO   Creatinine up and down, overall stable  Antibiotic dosages adjusted accordingly    Monitor creatinine      Discussed with patient in detail regarding the above plan  Plan for discharge to rehabilitation noted  Okay for discharge any time, from ID viewpoint      Antibiotics:  Vancomycin # 19  POD # 8     Subjective:  Patient's right-sided chest pain stable, mild and controlled  Temperature stays down   No chills  She is tolerating antibiotic well   No nausea, vomiting or diarrhea      Objective:  Vitals:  Temp:  [98 2 °F (36 8 °C)-98 5 °F (36 9 °C)] 98 3 °F (36 8 °C)  HR:  [62-70] 62  Resp:  [18-24] 20  BP: (127-139)/(75-80) 135/79  SpO2:  [97 %-100 %] 99 %  Temp (24hrs), Av 4 °F (36 9 °C), Min:98 2 °F (36 8 °C), Max:98 5 °F (36 9 °C)  Current: Temperature: 98 3 °F (36 8 °C)    Physical Exam:     General: Awake, alert, cooperative, no distress  Neck:  Supple  No mass  No lymphadenopathy  Lungs: Decreased breath sounds on right, stable mild right basilar rhonchi and rales, no wheezing, respirations unlabored  Heart:  Regular rate and rhythm, S1 and S2 normal, no murmur  Abdomen: Soft, nondistended, non-tender, bowel sounds active all four quadrants, no masses, no organomegaly  Extremities: Stable mild leg edema  No erythema/warmth  No draining ulcer  Nontender to palpation  Skin:  No rash  Neuro: Moves all extremities  Invasive Devices  Report    Peripherally Inserted Central Catheter Line  Duration           PICC Line 22 Right Brachial 3 days          Drain  Duration           External Urinary Catheter <1 day          Airway  Duration           Surgical Airway Shiley Fenestrated 165 days                Labs studies:   I have personally reviewed pertinent labs    Results from last 7 days   Lab Units 22  0448 22  0557 22  0442 22  0435 22  1856   POTASSIUM mmol/L 4 6 4 1 3 9   < > 4 4   CHLORIDE mmol/L 99 99 102   < > 102   CO2 mmol/L 34* 31 31   < > 28   BUN mg/dL 40* 37* 32*   < > 43*   CREATININE mg/dL 1 73* 1 71* 1 48*   < > 1 39*   EGFR ml/min/1 73sq m 32 33 39   < > 42   CALCIUM mg/dL 8 4 8 9 8 9   < > 7 7*   AST U/L  --   --   --   --  19   ALT U/L  --   --   --   --  11*   ALK PHOS U/L  --   --   --   --  56    < > = values in this interval not displayed  Results from last 7 days   Lab Units 08/11/22  0523 08/10/22  0406 08/09/22  0840   WBC Thousand/uL 7 94 9 24 8 92   HEMOGLOBIN g/dL 10 2* 9 7* 9 1*   PLATELETS Thousands/uL 484* 514* 446*           Imaging Studies:   I have personally reviewed pertinent imaging study reports and images in PACS  EKG, Pathology, and Other Studies:   I have personally reviewed pertinent reports

## 2022-08-14 NOTE — ASSESSMENT & PLAN NOTE
· STEMI 10/22/2021 s/p PATRICA mid circumflex OM1  OM2 was ballooned but not stented    · Pulseless VT 10/27/21 - repeat LHC 90% mid circumflex stenosis, but could not be intervened on  · VT 10/28/21 LHC:  found to have apical LAD complete occlusion was felt to be small to be intervened    · Cardiac carrest 1/1/22 - NO cardiac cath [facility] felt to be hypoxic vs  VT induced  · On metoprolol 50mg BID, Brilinta 90 mg BID and Lipitor 40 mg qd

## 2022-08-14 NOTE — PROGRESS NOTES
Vancomycin IV Pharmacy-to-Dose Consultation    Suyapa Wyatt is a 65 y o  female who is currently receiving Vancomycin IV with management by the Pharmacy Consult service for the treatment of empyema  Assessment/Plan:    The patient's chart was reviewed  Renal function is worsening (Scr 1 73 from 1 71, overall increasing, UOP still adequate 1 2 mL/kg/hr)  Recent supratherapeutic levels suggesting accumulation  There are no signs or symptoms of infusion reactions documented  Based on todays assessment, will change current vancomycin (Day # 13) dosing to 750 mg IV daily PRN when random level is less than 20, with a plan for random level to be drawn at 0600 on 8/15  Pharmacy will continue to follow closely for s/sx of nephrotoxicity, infusion reactions and appropriateness of therapy  BMP and CBC will be ordered per protocol  We will continue to follow the patients culture results and clinical progress daily        Michael Taylor, PharmD, [address] [facility] Clinical Pharmacist  [phone]

## 2022-08-14 NOTE — PROGRESS NOTES
[address] [facility]  Progress Notes - Family Medicine    Danyel Messenger 1965, 65 y o  female  MRN: [id number]  Unit/Bed#: [facility] 429-01 Encounter: [id number]  Primary Care Provider: Ellyn Keith MD      Admission Date: 8/2/2022 2031  Length of Stay: 12 days  Code Status: Level 1 - Full Code  Diet: Diet Regular; Regular House    Consult:   IP CONSULT TO PHARMACY  IP CONSULT TO CARDIOTHORACIC SURGERY  IP CONSULT TO PULMONOLOGY  IP CONSULT TO ANESTHESIOLOGY  IP CONSULT TO INFECTIOUS DISEASES  IP CONSULT TO PHYSICAL MEDICINE REHAB  IP CONSULT TO MEDICAL CRITICAL CARE  IP CONSULT TO PHYSICAL MEDICINE REHAB  IP CONSULT TO PICC TEAM    Assessments & Plans:     Disposition: Inpatient, awaiting insurance authorization for ARC    * Empyema lung, Right  Assessment & Plan  · Recurrent loculated right pleural effusion  S/P right-sided chest tube insertion and removal on 07/24  · CT of the chest on 7/28/22 revealed moderate partially loculated right pleural effusion  · Right chest tube re-placed on 7/29/22 by IR  · Pleural fluid analysis shows neutrophil predominant WBC count  · Fluid cultures from prior anterior chest tube site positive for MRSA  · 8/1 - CXR: Small component of right pleural effusion suspected which may be partially loculated  Indwelling right thoracostomy tube without pneumothorax  · 8/2 CT Chest:  Decreased size of a right-sided pleural effusion and improved aeration of the right lower lobe post placement of a right pleural drainage catheter, with unchanged appearance of a loculated component of the effusion superomedially  There is slightly  increased density of the effusion compatible with a small amount of blood products  2   Appearance of new inflammatory groundglass opacities in the left lower lobe  3   Mild background interstitial edema is unchanged  · 8/6 - S/P VATS Decortation  procedure     · 8/9 - Apical chest tube removed by thoracic surgery   · 8/11 - patient medically cleared for discharge to [facility]  Awaiting insurance authorization  · 8/11- PICC line placed   Plan:  - Continue Abx w/ Vanc given MRSA, Vanc will need to be continued through 8/20 per ID  - Aggressive pulmonary toilet  - Monitor fever curve and white count closely - patient has remained afebrile with stable white count    Type 2 diabetes mellitus with hyperglycemia [facility])  Assessment & Plan  Lab Results   Component Value Date    HGBA1C 12 7 (H) 05/22/2022       Recent Labs     08/13/22  1059 08/13/22  1612 08/13/22  2112 08/14/22  0606   POCGLU 278* 271* 287* 200*     Uncontrolled per most recent A1c  Home regimen: Lantus 16U QHS and Novolog 4U TID  Previous inpatient regimen: Lantus 45U qHS + Humalog 22U TID + SSI 4  Current regimen: Humalog d/c'd for now  FBG 170s this AM s/p Lantus 15U  Will monitor closely and possibly increase Lantus depending on coverage  Will continue for now and adjust as needed to maintain  - 180  Continue correctional insulin    Acute on chronic heart failure with preserved ejection fraction (HCC)  Assessment & Plan  Wt Readings from Last 3 Encounters:   08/13/22 78 7 kg (173 lb 8 oz)   08/02/22 77 7 kg (171 lb 6 4 oz)   07/19/22 78 2 kg (172 lb 6 4 oz)     · Initially admitted to [facility] on 07/15 with CHF exacerbation, now optimized  · Most recent EF 50% with normal systolic and diastolic function on 3/71/9233  Currently stable and euvolemic  · On Bumex 2 mg daily with Aldactone 100 mg daily - restarted on 7/29, Bumex d/c'd 8/13  · Continue low-salt diet, strict I's and O's monitoring  · Per Cardiology at [facility] upon discharge    · Will need follow-up with General Cardiology & EP service upon discharge for ICD consideration    Chest wall wound infection  Assessment & Plan  · Purulent discharge noted at site of recent chest tube placement  · Cultures positive for MRSA on 07/28 susceptible to vanc  · Currently on IV vancomycin - will continue thru 8/20 per ID  · Continue local wound care/dressing changes     CKD (chronic kidney disease) stage 3, GFR 30-59 ml/min [facility])  Assessment & Plan     Results from last 7 days   Lab Units 08/14/22  0448 08/13/22  0557 08/12/22  0442 08/11/22  0523 08/10/22  0406 08/09/22  0840 08/08/22  0435   BUN mg/dL 40* 37* 32* 34* 30* 32* 41*   CREATININE mg/dL 1 73* 1 71* 1 48* 1 44* 1 23 1 32* 1 34*   EGFR ml/min/1 73sq m 32 33 39 40 49 45 44     · MODESTO noted on initial admission to [address] [place] [address] with elevated Cr 1 64, initially resolved  (Baseline Cr being 0 9-1 0)  · Cr now bumped again during admission, bumex d/c'd 8/13 - monitor off  · Continue to monitor closely  Losartan on hold  · Avoid nephrotoxic agents  · Follow up Cr tomorrow given Bumex hold > if still elevated, consider decreasing dose of aldactone to 50mg    Anemia  Assessment & Plan    Results from last 7 days   Lab Units 08/11/22  0523 08/10/22  0406 08/09/22  0840 08/08/22  0435 08/07/22  1856 08/07/22  1423   HEMOGLOBIN g/dL 10 2* 9 7* 9 1* 8 4* 7 7* 8 2*   HEMATOCRIT % 34 9 30 5* 29 0* 26 1* 24 8* 26 9*     Most likely anemia of chronic disease due to prolonged illness  No active bleeding noted - Hb is stable  Hg (8/4): 6 8 s/p  5 unit PRBC's since this admission  Hb stable, monitor closely  Consider transfusing if < 8 due to history of CAD  Continue Iron replacement as well as daily miralax     CAD (coronary artery disease)  Assessment & Plan  · STEMI 10/22/2021 s/p PATRICA mid circumflex OM1  OM2 was ballooned but not stented    · Pulseless VT 10/27/21 - repeat LHC 90% mid circumflex stenosis, but could not be intervened on  · VT 10/28/21 LHC:  found to have apical LAD complete occlusion was felt to be small to be intervened    · Cardiac carrest 1/1/22 - NO cardiac cath at [facility] felt to be hypoxic vs  VT induced  · On metoprolol 50mg BID, Brilinta 90 mg BID and Lipitor 40 mg qd     A-fib [facility])  Assessment & Plan  Follows closely with Cardiology  Rhythm controlled with amiodarone 100 mg daily and AC with Eliquis 5 mg BID    Hypertension  Assessment & Plan  BP stable  On home losartan 50 mg BID, follows closely with Cardiology  Losartan on hold  Monitor BP closely     Hyperlipidemia associated with type 2 diabetes mellitus (HCC)  Assessment & Plan  - Continue Atorvastatin 40 mg daily  Diabetic peripheral neuropathy (HCC)  Assessment & Plan  Continue PTA Lyrica as documented     Anxiety  Assessment & Plan  On Lexapro 10 mg daily as well as Atarax prn         VTE Pharm PPX: Venodyne contraindicated due to Eliquis  VTE Mech PPX: sequential compression device    Subjective:   Patient with no acute events overnight per handoff  No concern or report per nursing staff  Patient seen and examined at bedside  Not in any acute distress  Patient denies fever, N/V, chest pain, dizziness, SOB, palpitations or headaches  She is able to tolerate PO with normal bowel habits and ambulate  Her glucose level noted running high  Lantus reordered at [facility] yesterday  Will monitor glucose off TID humalog  Patient has no concerns and is agreeable      Vitals:     Vitals:    08/13/22 2317 08/14/22 0249 08/14/22 0300 08/14/22 1000   BP: 127/76 139/80 139/80 135/79   BP Location: Left arm Left arm  Left arm   Pulse: 68 70  62   Resp: 18 18  20   Temp: 98 2 °F (36 8 °C) 98 3 °F (36 8 °C)  98 3 °F (36 8 °C)   TempSrc: Oral Oral  Oral   SpO2: 97% 98%  99%   Weight:         Temp:  [98 2 °F (36 8 °C)-98 5 °F (36 9 °C)] 98 3 °F (36 8 °C)  HR:  [62-70] 62  Resp:  [18-24] 20  BP: (127-139)/(75-80) 135/79  FiO2 (%):  [28] 28  Weight (last 2 days)     Date/Time Weight    08/13/22 0600 78 7 (173 5)          Intake/Output Summary (Last 24 hours) at 8/14/2022 1222  Last data filed at 8/14/2022 1158  Gross per 24 hour   Intake 1065 ml   Output 2350 ml   Net -1285 ml     Invasive Devices  Report    Peripherally Inserted Central Catheter Line  Duration           PICC Line 08/11/22 Right Brachial 3 days          Drain  Duration External Urinary Catheter <1 day          Airway  Duration           Surgical Airway Mariana Fenestrated 165 days                Physical Exam:   Physical Exam  Vitals reviewed  Constitutional:       General: She is not in acute distress  Appearance: She is not ill-appearing  HENT:      Head: Normocephalic and atraumatic  Right Ear: External ear normal       Left Ear: External ear normal       Mouth/Throat:      Mouth: Mucous membranes are moist    Eyes:      Conjunctiva/sclera: Conjunctivae normal    Cardiovascular:      Rate and Rhythm: Normal rate and regular rhythm  Heart sounds: Normal heart sounds  Pulmonary:      Effort: Pulmonary effort is normal  No respiratory distress  Comments: Trach collar in place  Chest:      Comments: CT site clean  Abdominal:      General: Bowel sounds are normal       Palpations: Abdomen is soft  Tenderness: There is no abdominal tenderness  Musculoskeletal:         General: No swelling or deformity  Cervical back: Normal range of motion  Right lower leg: No edema  Left lower leg: No edema  Skin:     General: Skin is warm  Coloration: Skin is not pale  Findings: No erythema or rash  Neurological:      Mental Status: She is alert            Labs:     CBC:  Results from last 7 days   Lab Units 08/11/22  0523 08/10/22  0406 08/09/22  0840 08/08/22  0435 08/07/22  1856 08/07/22  1423   WBC Thousand/uL 7 94 9 24 8 92 8 89 11 08*  --    HEMOGLOBIN g/dL 10 2* 9 7* 9 1* 8 4* 7 7* 8 2*   HEMATOCRIT % 34 9 30 5* 29 0* 26 1* 24 8* 26 9*   PLATELETS Thousands/uL 484* 514* 446* 373 387  --    NEUTROS ABS Thousands/µL 5 86 6 78 6 79 6 31 8 71*  --        CMP:  Results from last 7 days   Lab Units 08/14/22  0448 08/13/22  0557 08/12/22  0442 08/11/22  0523 08/10/22  0406 08/09/22  0840 08/08/22  0435 08/07/22  1856   POTASSIUM mmol/L 4 6 4 1 3 9 4 4 4 0 4 8 4 4 4 4   CHLORIDE mmol/L 99 99 102 102 100 103 105 102   CO2 mmol/L 34* 31 31 29 31 29 29 28   BUN mg/dL 40* 37* 32* 34* 30* 32* 41* 43*   CREATININE mg/dL 1 73* 1 71* 1 48* 1 44* 1 23 1 32* 1 34* 1 39*   CALCIUM mg/dL 8 4 8 9 8 9 8 8 8 7 8 4 8 1* 7 7*   AST U/L  --   --   --   --   --   --   --  19   ALT U/L  --   --   --   --   --   --   --  11*   ALK PHOS U/L  --   --   --   --   --   --   --  56   EGFR ml/min/1 73sq m 32 33 39 40 49 45 44 42       Sepsis:  Results from last 7 days   Lab Units 08/07/22  1856   LACTIC ACID mmol/L 0 7       Micro:  Lab Results   Component Value Date/Time    Blood Culture No Growth After 5 Days  05/11/2022 03:49 PM    Blood Culture No Growth After 5 Days  05/11/2022 03:30 PM    Sputum Culture 2+ Growth of Staphylococcus aureus (A) 07/26/2022 01:49 PM    Sputum Culture 1+ Growth of Pseudomonas aeruginosa (A) 07/26/2022 01:49 PM    Sputum Culture 1+ Growth of  07/26/2022 01:49 PM    Gram Stain Result Rare Polys 08/06/2022 10:11 AM    Gram Stain Result No bacteria seen 08/06/2022 10:11 AM    Urine Culture >100,000 cfu/ml Escherichia coli (A) 10/29/2021 03:44 PM    Wound Culture (A) 07/28/2022 12:01 PM     2+ Growth of Methicillin Resistant Staphylococcus aureus    Body Fluid Culture, Sterile No growth 08/06/2022 10:11 AM       Imaging:   XR chest portable    Result Date: 8/8/2022  Impression: 1   2 right chest tubes  Atelectasis at the right lung base  No pneumothorax  2   Increased density in the retrocardiac region may represent infiltrate, atelectasis and/or fluid  Workstation performed: [id number]     XR chest portable    Result Date: 8/3/2022  Impression: Improving right midlung field and bibasilar consolidation and effusion  Trace left pleural effusion persists  Stable position of right basilar pleural drainage catheter and tracheostomy  Workstation performed: [id number]     CT chest wo contrast    Result Date: 8/2/2022  Impression: 1    Decreased size of a right-sided pleural effusion and improved aeration of the right lower lobe post placement of a right pleural drainage catheter, with unchanged appearance of a loculated component of the effusion superomedially  There is slightly increased density of the effusion compatible with a small amount of blood products  2   Appearance of new inflammatory groundglass opacities in the left lower lobe  3   Mild background interstitial edema is unchanged  The study was marked in EPIC for significant notification  Workstation performed: [id number]     X-ray chest 1 view    Result Date: 8/6/2022  Impression: Revision of right-sided chest tubes  Cannot exclude small focus of loculated pneumothorax medial right base  No apical pneumothorax  Bibasilar partial volume loss and pleural effusions  Question asymmetric pulmonary venous congestion on the right   Workstation performed: [id number]       Medications:     Current Facility-Administered Medications   Medication Dose Route Frequency    acetaminophen (TYLENOL) tablet 975 mg  975 mg Oral Q8H [address]    albuterol inhalation solution 2 5 mg  2 5 mg Nebulization Q4H PRN    amiodarone tablet 100 mg  100 mg Oral Daily With Breakfast    apixaban (ELIQUIS) tablet 5 mg  5 mg Oral BID    atorvastatin (LIPITOR) tablet 40 mg  40 mg Oral Daily With Dinner    benzonatate (TESSALON PERLES) capsule 100 mg  100 mg Oral TID PRN    diazepam (VALIUM) tablet 5 mg  5 mg Oral Q6H PRN    escitalopram (LEXAPRO) tablet 10 mg  10 mg Oral Daily    ferrous sulfate tablet 325 mg  325 mg Oral Daily With Breakfast    HYDROmorphone (DILAUDID) injection 0 5 mg  0 5 mg Intravenous Q2H PRN    HYDROmorphone (DILAUDID) tablet 2 mg  2 mg Oral Q4H PRN    HYDROmorphone (DILAUDID) tablet 4 mg  4 mg Oral Q4H PRN    hydrOXYzine HCL (ATARAX) tablet 25 mg  25 mg Oral Q6H PRN    insulin glargine (LANTUS) subcutaneous injection 20 Units 0 2 mL  20 Units Subcutaneous HS    insulin lispro (HumaLOG) 100 units/mL subcutaneous injection 2-12 Units  2-12 Units Subcutaneous 4x Daily (AC & HS)    lidocaine (LIDODERM) 5 % patch 2 patch  2 patch Topical Daily    metoprolol succinate (TOPROL-XL) 24 hr tablet 50 mg  50 mg Oral Q12H    mirtazapine (REMERON) tablet 7 5 mg  7 5 mg Oral HS    ondansetron (ZOFRAN) injection 4 mg  4 mg Intravenous Q6H PRN    pantoprazole (PROTONIX) EC tablet 40 mg  40 mg Oral Early Morning    polyethylene glycol (MIRALAX) packet 17 g  17 g Oral Daily    polyethylene glycol (MIRALAX) packet 17 g  17 g Oral Daily PRN    pregabalin (LYRICA) capsule 25 mg  25 mg Oral QPM    pregabalin (LYRICA) capsule 75 mg  75 mg Oral Daily    senna (SENOKOT) tablet 8 6 mg  1 tablet Oral Daily    spironolactone (ALDACTONE) tablet 100 mg  100 mg Oral Daily    ticagrelor (BRILINTA) tablet 90 mg  90 mg Oral Q12H CEFERINO    trimethobenzamide (TIGAN) IM injection 200 mg  200 mg Intramuscular Q6H PRN    [START ON 8/15/2022] vancomycin (VANCOCIN) IVPB (premix in dextrose) 750 mg 150 mL  750 mg Intravenous Daily PRN    white petrolatum-mineral oil (EUCERIN,HYDROCERIN) cream   Topical BID       Patient was discussed with [facility] Attending on-call, Dr Mychal Ingram MD  See attestation above        Christophe Sierra MD  PGY-3 Family Medicine  08/14/22

## 2022-08-14 NOTE — ASSESSMENT & PLAN NOTE
Results from last 7 days   Lab Units 08/14/22  0448 08/13/22  0557 08/12/22  0442 08/11/22  0523 08/10/22  0406 08/09/22  0840 08/08/22  0435   BUN mg/dL 40* 37* 32* 34* 30* 32* 41*   CREATININE mg/dL 1 73* 1 71* 1 48* 1 44* 1 23 1 32* 1 34*   EGFR ml/min/1 73sq m 32 33 39 40 49 45 44     · MODESTO noted on initial admission [facility] with elevated Cr 1 64, initially resolved  (Baseline Cr being 0 9-1 0)  · Cr now bumped again during admission, bumex d/c'd 8/13 - monitor off  · Continue to monitor closely   Losartan on hold  · Avoid nephrotoxic agents  · Follow up Cr  given Bumex hold > if still elevated, consider decreasing dose of aldactone to 50mg

## 2022-08-14 NOTE — ASSESSMENT & PLAN NOTE
Lab Results   Component Value Date    HGBA1C 12 7 (H) 05/22/2022       Recent Labs     08/13/22  1059 08/13/22  1612 08/13/22  2112 08/14/22  0606   POCGLU 278* 271* 287* 200*     Uncontrolled per most recent A1c  Home regimen: Lantus 16U QHS and Novolog 4U TID  Previous inpatient regimen: Lantus 45U qHS + Humalog 22U TID + SSI 4  Current regimen: Lantus 20 units QHS and SSI 4  Will monitor closely and possibly increase Lantus depending on coverage  Will continue for now and adjust as needed to maintain  - 180

## 2022-08-14 NOTE — ASSESSMENT & PLAN NOTE
BP stable  On home losartan 50 mg BID, follows closely with Cardiology  Losartan on hold  Monitor BP closely

## 2022-08-15 LAB
ANION GAP SERPL CALCULATED.3IONS-SCNC: 5 MMOL/L (ref 4–13)
BUN SERPL-MCNC: 42 MG/DL (ref 5–25)
CALCIUM SERPL-MCNC: 8.9 MG/DL (ref 8.3–10.1)
CHLORIDE SERPL-SCNC: 103 MMOL/L (ref 96–108)
CO2 SERPL-SCNC: 31 MMOL/L (ref 21–32)
CREAT SERPL-MCNC: 1.72 MG/DL (ref 0.6–1.3)
FUNGUS SPEC CULT: NORMAL
FUNGUS SPEC CULT: NORMAL
GFR SERPL CREATININE-BSD FRML MDRD: 32 ML/MIN/1.73SQ M
GLUCOSE SERPL-MCNC: 163 MG/DL (ref 65–140)
GLUCOSE SERPL-MCNC: 193 MG/DL (ref 65–140)
GLUCOSE SERPL-MCNC: 221 MG/DL (ref 65–140)
GLUCOSE SERPL-MCNC: 247 MG/DL (ref 65–140)
GLUCOSE SERPL-MCNC: 248 MG/DL (ref 65–140)
GLUCOSE SERPL-MCNC: 316 MG/DL (ref 65–140)
GLUCOSE SERPL-MCNC: 344 MG/DL (ref 65–140)
POTASSIUM SERPL-SCNC: 5.1 MMOL/L (ref 3.5–5.3)
SODIUM SERPL-SCNC: 139 MMOL/L (ref 135–147)
VANCOMYCIN SERPL-MCNC: 22.3 UG/ML (ref 10–20)

## 2022-08-15 PROCEDURE — 82948 REAGENT STRIP/BLOOD GLUCOSE: CPT

## 2022-08-15 PROCEDURE — 80202 ASSAY OF VANCOMYCIN: CPT | Performed by: INTERNAL MEDICINE

## 2022-08-15 PROCEDURE — 94760 N-INVAS EAR/PLS OXIMETRY 1: CPT

## 2022-08-15 PROCEDURE — 80048 BASIC METABOLIC PNL TOTAL CA: CPT

## 2022-08-15 PROCEDURE — 97530 THERAPEUTIC ACTIVITIES: CPT

## 2022-08-15 PROCEDURE — 99232 SBSQ HOSP IP/OBS MODERATE 35: CPT | Performed by: INTERNAL MEDICINE

## 2022-08-15 PROCEDURE — 97116 GAIT TRAINING THERAPY: CPT

## 2022-08-15 PROCEDURE — 99231 SBSQ HOSP IP/OBS SF/LOW 25: CPT | Performed by: FAMILY MEDICINE

## 2022-08-15 RX ORDER — SPIRONOLACTONE 50 MG/1
50 TABLET, FILM COATED ORAL DAILY
Status: DISCONTINUED | OUTPATIENT
Start: 2022-08-16 | End: 2022-08-17

## 2022-08-15 RX ORDER — INSULIN LISPRO 100 [IU]/ML
5-25 INJECTION, SOLUTION INTRAVENOUS; SUBCUTANEOUS
Status: DISCONTINUED | OUTPATIENT
Start: 2022-08-15 | End: 2022-08-17

## 2022-08-15 RX ADMIN — SPIRONOLACTONE 100 MG: 50 TABLET ORAL at 08:21

## 2022-08-15 RX ADMIN — TICAGRELOR 90 MG: 90 TABLET ORAL at 22:10

## 2022-08-15 RX ADMIN — METOPROLOL SUCCINATE 50 MG: 50 TABLET, EXTENDED RELEASE ORAL at 05:32

## 2022-08-15 RX ADMIN — PREGABALIN 25 MG: 25 CAPSULE ORAL at 17:46

## 2022-08-15 RX ADMIN — METOPROLOL SUCCINATE 50 MG: 50 TABLET, EXTENDED RELEASE ORAL at 17:49

## 2022-08-15 RX ADMIN — INSULIN LISPRO 4 UNITS: 100 INJECTION, SOLUTION INTRAVENOUS; SUBCUTANEOUS at 07:32

## 2022-08-15 RX ADMIN — APIXABAN 5 MG: 5 TABLET, FILM COATED ORAL at 08:21

## 2022-08-15 RX ADMIN — AMIODARONE HYDROCHLORIDE 100 MG: 200 TABLET ORAL at 08:21

## 2022-08-15 RX ADMIN — ATORVASTATIN CALCIUM 40 MG: 40 TABLET, FILM COATED ORAL at 17:45

## 2022-08-15 RX ADMIN — HYDROMORPHONE HYDROCHLORIDE 2 MG: 2 TABLET ORAL at 22:09

## 2022-08-15 RX ADMIN — PANTOPRAZOLE SODIUM 40 MG: 40 TABLET, DELAYED RELEASE ORAL at 05:32

## 2022-08-15 RX ADMIN — INSULIN LISPRO 8 UNITS: 100 INJECTION, SOLUTION INTRAVENOUS; SUBCUTANEOUS at 11:29

## 2022-08-15 RX ADMIN — MIRTAZAPINE 7.5 MG: 15 TABLET, FILM COATED ORAL at 22:10

## 2022-08-15 RX ADMIN — APIXABAN 5 MG: 5 TABLET, FILM COATED ORAL at 17:46

## 2022-08-15 RX ADMIN — LIDOCAINE 5% 1 PATCH: 700 PATCH TOPICAL at 08:22

## 2022-08-15 RX ADMIN — TICAGRELOR 90 MG: 90 TABLET ORAL at 08:20

## 2022-08-15 RX ADMIN — PREGABALIN 75 MG: 75 CAPSULE ORAL at 08:21

## 2022-08-15 RX ADMIN — INSULIN LISPRO 20 UNITS: 100 INJECTION, SOLUTION INTRAVENOUS; SUBCUTANEOUS at 17:49

## 2022-08-15 RX ADMIN — ESCITALOPRAM OXALATE 10 MG: 10 TABLET ORAL at 08:21

## 2022-08-15 RX ADMIN — Medication: at 08:29

## 2022-08-15 RX ADMIN — INSULIN GLARGINE 20 UNITS: 100 INJECTION, SOLUTION SUBCUTANEOUS at 22:09

## 2022-08-15 NOTE — PROGRESS NOTES
Progress Note - Infectious Disease   Kelli Red 65 y o  female MRN: [id number]  Unit/Bed#: [facility] 429-01 Encounter: [id number]      Impression/Recommendations:  1  Probable right-sided empyema   CT show loculated right pleural effusion   Patient is status post placement of chest tube on 07/29, with pleural fluid parameters consistent with empyema   Culture has no growth, likely due to prior antibiotic   Given MRSA in right chest wound, MRSA is likely pathogen in empyema   Despite chest tube drainage, repeat chest CT on 08/02 showed persistent and unchanged loculated empyema   Patient is now status post VATS/decortication   Operative culture with no growth, likely due to prolonged antibiotic prior to surgery   Chest tube was removed   Patient is clinically much improved  Continue IV vancomycin  Monitor temperature/WBC  Follow-up final operative culture  Treat x 2 weeks after VATS, through 8/20      2  Sepsis, with leukocytosis and tachypnea, likely secondary to empyema above   Patient is clinically and systemically improved   WBC has normalized   Tachypnea resolved  Paloma Diaz Antibiotic plan as in below  Monitor temperature/WBC  Monitor hemodynamics      3  MRSA right chest wall infection, as site recent/prior chest tube placement for spontaneous pneumothorax  Antibiotic plan as in above  Serial exams      4  Acute on chronic hypoxic respiratory failure, likely multifactorial   Respiratory culture with MRSA and Pseudomonas but no obvious pneumonia   These isolates are most likely airway colonization  Management per primary service      5  Recent spontaneous pneumothorax, status post chest tube placement on 07/20   This was removed on 07/24      6  DM, type 2, poorly controlled, with hyperglycemia and elevated hemoglobin A1c   This is risk factor for infection above  Management per primary service      7  MODESTO   Creatinine up and down, overall stable  Antibiotic dosages adjusted accordingly    Monitor creatinine      Discussed with patient in detail regarding the above plan  Plan for discharge to rehabilitation noted  Okay for discharge any time, from ID viewpoint      Antibiotics:  Vancomycin # 20  POD # 9     Subjective:  Patient's right-sided chest pain mild and controlled  Temperature stays down   No chills  She is tolerating antibiotic well   No nausea, vomiting or diarrhea      Objective:  Vitals:  Temp:  [96 4 °F (35 8 °C)-99 °F (37 2 °C)] 98 4 °F (36 9 °C)  HR:  [58-90] 90  Resp:  [18-21] 18  BP: (126-161)/(74-98) 141/79  SpO2:  [95 %-98 %] 97 %  Temp (24hrs), Av 3 °F (36 8 °C), Min:96 4 °F (35 8 °C), Max:99 °F (37 2 °C)  Current: Temperature: 98 4 °F (36 9 °C)    Physical Exam:     General: Awake, alert, cooperative, no distress  Neck:  Supple  No mass  No lymphadenopathy  Lungs: Decreased breath sounds on right, sparse right basilar rhonchi and rales, no wheezing, respirations unlabored  Heart:  Regular rate and rhythm, S1 and S2 normal, no murmur  Abdomen: Soft, nondistended, non-tender, bowel sounds active all four quadrants, no masses, no organomegaly  Extremities: Trace leg edema  No erythema/warmth  No ulcer  Nontender to palpation  Skin:  No rash  Neuro: Moves all extremities  Invasive Devices  Report    Peripherally Inserted Central Catheter Line  Duration           PICC Line 22 Right Brachial 4 days          Drain  Duration           External Urinary Catheter 1 day          Airway  Duration           Surgical Airway Shiley Fenestrated 166 days                Labs studies:   I have personally reviewed pertinent labs    Results from last 7 days   Lab Units 08/15/22  0532 22  0448 22  0557   POTASSIUM mmol/L 5 1 4 6 4 1   CHLORIDE mmol/L 103 99 99   CO2 mmol/L 31 34* 31   BUN mg/dL 42* 40* 37*   CREATININE mg/dL 1 72* 1 73* 1 71*   EGFR ml/min/1 73sq m 32 32 33   CALCIUM mg/dL 8 9 8 4 8 9     Results from last 7 days   Lab Units 22  0523 08/10/22  0406 08/09/22  0840   WBC Thousand/uL 7 94 9 24 8 92   HEMOGLOBIN g/dL 10 2* 9 7* 9 1*   PLATELETS Thousands/uL 484* 514* 446*           Imaging Studies:   I have personally reviewed pertinent imaging study reports and images in PACS  EKG, Pathology, and Other Studies:   I have personally reviewed pertinent reports

## 2022-08-15 NOTE — PROGRESS NOTES
Vancomycin IV Pharmacy-to-Dose Consultation     Noble Aschoff is a 65 y o  female who is currently receiving Vancomycin IV with management by the Pharmacy Consult service for the treatment of empyema  Assessment/Plan:     The patient's chart was reviewed  Renal function has worsened: Scr 1 72 (08/15) from 1 23 (08/10)  Patient's most recent vancomycin level of 22 3 with morning labs (08/15) is supratherapeutic  There are no signs or symptoms of infusion reactions documented  Based on todays assessment, will keep current vancomycin (Day # 14) dosing to 750 mg IV daily PRN when random level is less than 20, with a plan for random level to be drawn at 0600 on 8/16  Per ID treatment through 8/20  Pharmacy will continue to follow closely for s/sx of nephrotoxicity, infusion reactions and appropriateness of therapy  BMP and CBC will be ordered per protocol  We will continue to follow the patients culture results and clinical progress daily

## 2022-08-15 NOTE — ARC ADMISSION
Spoke with [facility],  with [employer] - patient's case request for inpatient acute rehab remains pending at this time  Was notified that they have up to 14 business days for a determination  CM has been updated  Will continue to follow and update as able

## 2022-08-15 NOTE — PLAN OF CARE
Problem: MOBILITY - ADULT  Goal: Maintain or return to baseline ADL function  Description: INTERVENTIONS:  -  Assess patient's ability to carry out ADLs; assess patient's baseline for ADL function and identify physical deficits which impact ability to perform ADLs (bathing, care of mouth/teeth, toileting, grooming, dressing, etc )  - Assess/evaluate cause of self-care deficits   - Assess range of motion  - Assess patient's mobility; develop plan if impaired  - Assess patient's need for assistive devices and provide as appropriate  - Encourage maximum independence but intervene and supervise when necessary  - Involve family in performance of ADLs  - Assess for home care needs following discharge   - Consider OT consult to assist with ADL evaluation and planning for discharge  - Provide patient education as appropriate  Outcome: Progressing  Goal: Maintains/Returns to pre admission functional level  Description: INTERVENTIONS:  - Perform BMAT or MOVE assessment daily    - Set and communicate daily mobility goal to care team and patient/family/caregiver  - Collaborate with rehabilitation services on mobility goals if consulted  - Perform Range of Motion  times a day  - Reposition patient every  hours    - Dangle patient  times a day  - Stand patient  times a day  - Ambulate patient  times a day  - Out of bed to chair  times a day   - Out of bed for meals  times a day  - Out of bed for toileting  - Record patient progress and toleration of activity level   Outcome: Progressing     Problem: RESPIRATORY - ADULT  Goal: Achieves optimal ventilation and oxygenation  Description: INTERVENTIONS:  - Assess for changes in respiratory status  - Assess for changes in mentation and behavior  - Position to facilitate oxygenation and minimize respiratory effort  - Oxygen administered by appropriate delivery if ordered  - Initiate smoking cessation education as indicated  - Encourage broncho-pulmonary hygiene including cough, deep breathe, Incentive Spirometry  - Assess the need for suctioning and aspirate as needed  - Assess and instruct to report SOB or any respiratory difficulty  - Respiratory Therapy support as indicated  Outcome: Progressing     Problem: Prexisting or High Potential for Compromised Skin Integrity  Goal: Skin integrity is maintained or improved  Description: INTERVENTIONS:  - Identify patients at risk for skin breakdown  - Assess and monitor skin integrity  - Assess and monitor nutrition and hydration status  - Monitor labs   - Assess for incontinence   - Turn and reposition patient  - Assist with mobility/ambulation  - Relieve pressure over bony prominences  - Avoid friction and shearing  - Provide appropriate hygiene as needed including keeping skin clean and dry  - Evaluate need for skin moisturizer/barrier cream  - Collaborate with interdisciplinary team   - Patient/family teaching  - Consider wound care consult   Outcome: Progressing     Problem: Potential for Falls  Goal: Patient will remain free of falls  Description: INTERVENTIONS:  - Educate patient/family on patient safety including physical limitations  - Instruct patient to call for assistance with activity   - Consult OT/PT to assist with strengthening/mobility   - Keep Call bell within reach  - Keep bed low and locked with side rails adjusted as appropriate  - Keep care items and personal belongings within reach  - Initiate and maintain comfort rounds  - Make Fall Risk Sign visible to staff  - Offer Toileting every  Hours, in advance of need  - Initiate/Maintain alarm  - Obtain necessary fall risk management equipment  - Apply yellow socks and bracelet for high fall risk patients  - Consider moving patient to room near nurses station  Outcome: Progressing     Problem: CARDIOVASCULAR - ADULT  Goal: Maintains optimal cardiac output and hemodynamic stability  Description: INTERVENTIONS:  - Monitor I/O, vital signs and rhythm  - Monitor for S/S and trends of decreased cardiac output  - Administer and titrate ordered vasoactive medications to optimize hemodynamic stability  - Assess quality of pulses, skin color and temperature  - Assess for signs of decreased coronary artery perfusion  - Instruct patient to report change in severity of symptoms  Outcome: Progressing  Goal: Absence of cardiac dysrhythmias or at baseline rhythm  Description: INTERVENTIONS:  - Continuous cardiac monitoring, vital signs, obtain 12 lead EKG if ordered  - Administer antiarrhythmic and heart rate control medications as ordered  - Monitor electrolytes and administer replacement therapy as ordered  Outcome: Progressing     Problem: Knowledge Deficit  Goal: Patient/family/caregiver demonstrates understanding of disease process, treatment plan, medications, and discharge instructions  Description: Complete learning assessment and assess knowledge base    Interventions:  - Provide teaching at level of understanding  - Provide teaching via preferred learning methods  Outcome: Progressing

## 2022-08-15 NOTE — PLAN OF CARE
Problem: PHYSICAL THERAPY ADULT  Goal: Performs mobility at highest level of function for planned discharge setting  See evaluation for individualized goals  Description: Treatment/Interventions: Functional transfer training, LE strengthening/ROM, Therapeutic exercise, Endurance training, Equipment eval/education, Bed mobility, Gait training, Spoke to nursing  Equipment Recommended: Payal Aldana       See flowsheet documentation for full assessment, interventions and recommendations  Outcome: Progressing  Note: Prognosis: Good  Problem List: Decreased strength, Decreased endurance, Impaired balance, Decreased mobility, Pain, Decreased safety awareness, Decreased skin integrity  Assessment: Pt seen for skilled PT session w/ focus on bed mobility training, t/f training, + gait training  All levels of mobility require extended time due to fatigue + for safety w/ line management  Pt required cues to maintain CoM within [place]; improved slightly w/ elevation of RW  Distance primarily limited by CONNOLLY  From a PT standpoint continue to recommend rehab upon d/c   Barriers to Discharge: Inaccessible home environment     PT Discharge Recommendation: Post acute rehabilitation services    See flowsheet documentation for full assessment

## 2022-08-15 NOTE — PROGRESS NOTES
[facility]  Progress Note - Belén Ortiz 1965, 65 y o  female MRN: [id number]  Unit/Bed#: [facility] 429-01 Encounter: [id number]  Primary Care Provider: Alexandria Bran MD   Date and time admitted to hospital: 8/2/2022  8:31 PM    * Empyema lung, Right  Assessment & Plan  · Recurrent loculated right pleural effusion  S/P right-sided chest tube insertion and removal on 07/24  · CT of the chest on 7/28/22 revealed moderate partially loculated right pleural effusion  · Right chest tube re-placed on 7/29/22 by IR  · Pleural fluid analysis shows neutrophil predominant WBC count  · Fluid cultures from prior anterior chest tube site positive for MRSA  · 8/1 - CXR: Small component of right pleural effusion suspected which may be partially loculated  Indwelling right thoracostomy tube without pneumothorax  · 8/2 CT Chest:  Decreased size of a right-sided pleural effusion and improved aeration of the right lower lobe post placement of a right pleural drainage catheter, with unchanged appearance of a loculated component of the effusion superomedially  There is slightly  increased density of the effusion compatible with a small amount of blood products  2   Appearance of new inflammatory groundglass opacities in the left lower lobe  3   Mild background interstitial edema is unchanged  · 8/6 - S/P VATS Decortation  procedure  · 8/9 - Apical chest tube removed by thoracic surgery   · 8/11 - patient medically cleared for discharge to [facility]  Awaiting insurance authorization     · 8/11- PICC line placed   Plan:  - Continue Abx w/ Vanc given MRSA, Vanc will need to be continued through 8/20 per ID  - Aggressive pulmonary toilet  - Monitor fever curve and white count closely - patient has remained afebrile with stable white count    Acute on chronic heart failure with preserved ejection fraction [facility])  Assessment & Plan  Wt Readings from Last 3 Encounters:   08/13/22 78 7 kg (173 lb 8 oz) 08/02/22 77 7 kg (171 lb 6 4 oz)   07/19/22 78 2 kg (172 lb 6 4 oz)     · Initially admitted to [facility] on 07/15 with CHF exacerbation, now optimized  · Most recent EF 50% with normal systolic and diastolic function on 2/43/7051  Currently stable and euvolemic  · On Bumex 2 mg daily with Aldactone 100 mg daily - restarted on 7/29, Bumex d/c'd 8/13  · Continue low-salt diet, strict I's and O's monitoring  · Per Cardiology at [facility] upon discharge  · Will need follow-up with General Cardiology & EP service upon discharge for ICD consideration    Chest wall wound infection  Assessment & Plan  · Purulent discharge noted at site of recent chest tube placement  · Cultures positive for MRSA on 07/28 susceptible to vanc  · Currently on IV vancomycin - will continue thru 8/20 per ID  · Continue local wound care/dressing changes     CKD (chronic kidney disease) stage 3, GFR 30-59 ml/min [facility])  Assessment & Plan     Results from last 7 days   Lab Units 08/14/22  0448 08/13/22  0557 08/12/22  0442 08/11/22  0523 08/10/22  0406 08/09/22  0840 08/08/22  0435   BUN mg/dL 40* 37* 32* 34* 30* 32* 41*   CREATININE mg/dL 1 73* 1 71* 1 48* 1 44* 1 23 1 32* 1 34*   EGFR ml/min/1 73sq m 32 33 39 40 49 45 44     · MODESTO noted on initial admission to [facility] with elevated Cr 1 64, initially resolved  (Baseline Cr being 0 9-1 0)  · Cr now bumped again during admission, bumex d/c'd 8/13 - monitor off  · Continue to monitor closely   Losartan on hold  · Avoid nephrotoxic agents  · Follow up Cr  given Bumex hold > if still elevated, consider decreasing dose of aldactone to 50mg    Anemia  Assessment & Plan    Results from last 7 days   Lab Units 08/11/22  0523 08/10/22  0406 08/09/22  0840 08/08/22  0435 08/07/22  1856 08/07/22  1423   HEMOGLOBIN g/dL 10 2* 9 7* 9 1* 8 4* 7 7* 8 2*   HEMATOCRIT % 34 9 30 5* 29 0* 26 1* 24 8* 26 9*     Most likely anemia of chronic disease due to prolonged illness  No active bleeding noted - Hb is stable  Hg (8/4): 6 8 s/p  5 unit PRBC's since this admission  Hb stable, monitor closely  Consider transfusing if < 8 due to history of CAD  Continue Iron replacement as well as daily miralax     CAD (coronary artery disease)  Assessment & Plan  · STEMI 10/22/2021 s/p PATRICA mid circumflex OM1  OM2 was ballooned but not stented    · Pulseless VT 10/27/21 - repeat LHC 90% mid circumflex stenosis, but could not be intervened on  · VT 10/28/21 LHC:  found to have apical LAD complete occlusion was felt to be small to be intervened  · Cardiac carrest 1/1/22 - NO cardiac cath [facility] felt to be hypoxic vs  VT induced  · On metoprolol 50mg BID, Brilinta 90 mg BID and Lipitor 40 mg qd     A-fib [facility])  Assessment & Plan  Follows closely with Cardiology  Rhythm controlled with amiodarone 100 mg daily and AC with Eliquis 5 mg BID    Type 2 diabetes mellitus with hyperglycemia [facility])  Assessment & Plan  Lab Results   Component Value Date    HGBA1C 12 7 (H) 05/22/2022       Recent Labs     08/13/22  1059 08/13/22  1612 08/13/22  2112 08/14/22  0606   POCGLU 278* 271* 287* 200*     Uncontrolled per most recent A1c  Home regimen: Lantus 16U QHS and Novolog 4U TID  Previous inpatient regimen: Lantus 45U qHS + Humalog 22U TID + SSI 4  Current regimen: Lantus 20 units QHS and SSI 4  Will monitor closely and possibly increase Lantus depending on coverage  Will continue for now and adjust as needed to maintain  - 180      Hypertension  Assessment & Plan  BP stable  On home losartan 50 mg BID, follows closely with Cardiology  Losartan on hold  Monitor BP closely     Hyperlipidemia associated with type 2 diabetes mellitus (HCC)  Assessment & Plan  - Continue Atorvastatin 40 mg daily       Diabetic peripheral neuropathy (HCC)  Assessment & Plan  Continue PTA Lyrica as documented     Anxiety  Assessment & Plan  On Lexapro 10 mg daily as well as Atarax prn             Subjective/Objective     Subjective:   Patient seen and examined at bedside  No overnight events  Patient denies any acute complains or concerns  Has not had a bowel movement in 2 days  Objective:  Vitals: Blood pressure 161/98, pulse 90, temperature (!) 96 4 °F (35 8 °C), temperature source Axillary, resp  rate 18, weight 78 7 kg (173 lb 8 oz), SpO2 97 %  ,Body mass index is 25 62 kg/m²  Intake/Output Summary (Last 24 hours) at 8/15/2022 0902  Last data filed at 8/14/2022 2001  Gross per 24 hour   Intake 120 ml   Output 1000 ml   Net -880 ml       Invasive Devices  Report    Peripherally Inserted Central Catheter Line  Duration           PICC Line 08/11/22 Right Brachial 3 days          Drain  Duration           External Urinary Catheter 1 day          Airway  Duration           Surgical Airway Shiley Fenestrated 166 days              Physical Exam  Vitals reviewed  Constitutional:       General: She is not in acute distress  Appearance: She is not ill-appearing  HENT:      Head: Normocephalic and atraumatic  Right Ear: External ear normal       Left Ear: External ear normal       Mouth/Throat:      Mouth: Mucous membranes are moist    Eyes:      Conjunctiva/sclera: Conjunctivae normal    Cardiovascular:      Rate and Rhythm: Normal rate and regular rhythm  Heart sounds: Normal heart sounds  Pulmonary:      Effort: Pulmonary effort is normal  No respiratory distress  Comments: Trach collar in place  Chest:      Comments: CT site clean  Abdominal:      General: Bowel sounds are normal       Palpations: Abdomen is soft  Tenderness: There is no abdominal tenderness  Musculoskeletal:         General: No swelling or deformity  Cervical back: Normal range of motion  Right lower leg: No edema  Left lower leg: No edema  Skin:     General: Skin is warm  Coloration: Skin is not pale  Findings: No erythema or rash  Neurological:      Mental Status: She is alert       Lab, Imaging and other studies: I have personally reviewed pertinent reports  VTE Pharmacologic Prophylaxis: Reason for no pharmacologic prophylaxis Pt  on Eliquis      VTE Mechanical Prophylaxis: sequential compression device

## 2022-08-15 NOTE — PHYSICAL THERAPY NOTE
Physical Therapy Treatment Note    Patient's Name: Sylvia Ugarte  : 1965     08/15/22 1239   PT Last Visit   PT Visit Date 08/15/22   Note Type   Note Type Treatment   Pain Assessment   Pain Assessment Tool 0-10   Pain Score 5   Pain Location/Orientation Orientation: Right;Location: Chest;Location: Incision   Hospital Pain Intervention(s) Repositioned; Ambulation/increased activity   Restrictions/Precautions   Weight Bearing Precautions Per Order No   Other Precautions Contact/isolation;Multiple lines;Telemetry; Fall Risk;Pain  (trach collar, use Venturi mask when mobilizing, 5L/min)   General   Chart Reviewed Yes   Response to Previous Treatment Patient with no complaints from previous session  Family/Caregiver Present No   Subjective   Subjective Pt agreeable to mobilize  Bed Mobility   Supine to Sit 5  Supervision   Additional items HOB elevated; Increased time required;Verbal cues   Sit to Supine Unable to assess   Additional Comments Pt greeted in supine  Transfers   Sit to Stand 4  Minimal assistance   Additional items Assist x 1; Increased time required;Verbal cues;Armrests   Stand to Sit 5  Supervision   Additional items Increased time required;Verbal cues   Toilet transfer 4  Minimal assistance   Additional items Assist x 1; Increased time required;Verbal cues; Commode   Additional Comments RW   Ambulation/Elevation   Gait pattern Excessively slow; Short stride; Foward flexed;Decreased foot clearance;Decreased heel strike  (foot drop LLE)   Gait Assistance 4  Minimal assist   Additional items Assist x 1;Verbal cues; Tactile cues   Assistive Device Rolling walker   Distance 35'x2   Stair Management Assistance Not tested   Ambulation/Elevation Additional Comments Multiple standing rest breaks due to fatigue   Balance   Static Sitting Fair +   Dynamic Sitting Fair   Static Standing Fair -   Dynamic Standing Poor +   Ambulatory Poor +  (RW)   Endurance Deficit   Endurance Deficit Yes   Endurance Deficit Description CONNOLLY (however SpO2 92% + above), frequent standing rest breaks   Activity Tolerance   Activity Tolerance Patient limited by fatigue   Nurse Made Aware yes - cleared + updated   Assessment   Prognosis Good   Problem List Decreased strength;Decreased endurance; Impaired balance;Decreased mobility;Pain;Decreased safety awareness;Decreased skin integrity   Assessment Pt seen for skilled PT session w/ focus on bed mobility training, t/f training, + gait training  All levels of mobility require extended time due to fatigue + for safety w/ line management  Pt required cues to maintain CoM within [place]; improved slightly w/ elevation of RW  Distance primarily limited by CONNOLLY  From a PT standpoint continue to recommend rehab upon d/c    Barriers to Discharge Inaccessible home environment   Goals   Patient Goals go to the bathroom   PT Treatment Day 5   Plan   Treatment/Interventions Functional transfer training;LE strengthening/ROM; Therapeutic exercise; Endurance training;Patient/family training;Equipment eval/education; Bed mobility;Gait training; Compensatory technique education;Spoke to nursing  (balance training)   Progress Progressing toward goals   PT Frequency   (3-6x/wk)   Recommendation   PT Discharge Recommendation Post acute rehabilitation services   Equipment Recommended [facility] Recommended Wheeled walker   Change/add to [employer]?  No   AM-PAC Basic Mobility Inpatient   Turning in Bed Without Bedrails 3   Lying on Back to Sitting on Edge of Flat Bed 3   Moving Bed to Chair 3   Standing Up From Chair 3   Walk in Room 3   Climb 3-5 Stairs 1   Basic Mobility Inpatient Raw Score 16   Basic Mobility Standardized Score 38 32   Highest Level Of Mobility   JH-HLM Goal 5: Stand one or more mins   JH-HLM Achieved 7: Walk 25 feet or more   Education   Education Provided Mobility training;Assistive device   Patient Demonstrates acceptance/verbal understanding;Reinforcement needed   End of Consult   Patient Position at End of Consult Bedside chair; All needs within reach  (on waffle cushion, all lines in tact, in NAD)     Suzette Recio, PT, DPT

## 2022-08-16 LAB
ANION GAP SERPL CALCULATED.3IONS-SCNC: 3 MMOL/L (ref 4–13)
BUN SERPL-MCNC: 42 MG/DL (ref 5–25)
CALCIUM SERPL-MCNC: 8.7 MG/DL (ref 8.3–10.1)
CHLORIDE SERPL-SCNC: 105 MMOL/L (ref 96–108)
CO2 SERPL-SCNC: 30 MMOL/L (ref 21–32)
CREAT SERPL-MCNC: 1.68 MG/DL (ref 0.6–1.3)
ERYTHROCYTE [DISTWIDTH] IN BLOOD BY AUTOMATED COUNT: 18.5 % (ref 11.6–15.1)
GFR SERPL CREATININE-BSD FRML MDRD: 33 ML/MIN/1.73SQ M
GLUCOSE SERPL-MCNC: 187 MG/DL (ref 65–140)
GLUCOSE SERPL-MCNC: 189 MG/DL (ref 65–140)
GLUCOSE SERPL-MCNC: 258 MG/DL (ref 65–140)
GLUCOSE SERPL-MCNC: 260 MG/DL (ref 65–140)
GLUCOSE SERPL-MCNC: 339 MG/DL (ref 65–140)
HCT VFR BLD AUTO: 27.5 % (ref 34.8–46.1)
HGB BLD-MCNC: 8.3 G/DL (ref 11.5–15.4)
MCH RBC QN AUTO: 25.6 PG (ref 26.8–34.3)
MCHC RBC AUTO-ENTMCNC: 30.2 G/DL (ref 31.4–37.4)
MCV RBC AUTO: 85 FL (ref 82–98)
PLATELET # BLD AUTO: 587 THOUSANDS/UL (ref 149–390)
PMV BLD AUTO: 9.4 FL (ref 8.9–12.7)
POTASSIUM SERPL-SCNC: 5.4 MMOL/L (ref 3.5–5.3)
RBC # BLD AUTO: 3.24 MILLION/UL (ref 3.81–5.12)
SODIUM SERPL-SCNC: 138 MMOL/L (ref 135–147)
VANCOMYCIN SERPL-MCNC: 15.5 UG/ML (ref 10–20)
WBC # BLD AUTO: 9.05 THOUSAND/UL (ref 4.31–10.16)

## 2022-08-16 PROCEDURE — 85027 COMPLETE CBC AUTOMATED: CPT | Performed by: INTERNAL MEDICINE

## 2022-08-16 PROCEDURE — 80048 BASIC METABOLIC PNL TOTAL CA: CPT | Performed by: INTERNAL MEDICINE

## 2022-08-16 PROCEDURE — 80202 ASSAY OF VANCOMYCIN: CPT | Performed by: INTERNAL MEDICINE

## 2022-08-16 PROCEDURE — 97535 SELF CARE MNGMENT TRAINING: CPT

## 2022-08-16 PROCEDURE — 97116 GAIT TRAINING THERAPY: CPT

## 2022-08-16 PROCEDURE — 82948 REAGENT STRIP/BLOOD GLUCOSE: CPT

## 2022-08-16 PROCEDURE — 99231 SBSQ HOSP IP/OBS SF/LOW 25: CPT | Performed by: FAMILY MEDICINE

## 2022-08-16 PROCEDURE — 94760 N-INVAS EAR/PLS OXIMETRY 1: CPT

## 2022-08-16 PROCEDURE — 99232 SBSQ HOSP IP/OBS MODERATE 35: CPT | Performed by: INTERNAL MEDICINE

## 2022-08-16 RX ADMIN — PREGABALIN 25 MG: 25 CAPSULE ORAL at 17:11

## 2022-08-16 RX ADMIN — MIRTAZAPINE 7.5 MG: 15 TABLET, FILM COATED ORAL at 22:05

## 2022-08-16 RX ADMIN — AMIODARONE HYDROCHLORIDE 100 MG: 200 TABLET ORAL at 08:32

## 2022-08-16 RX ADMIN — TICAGRELOR 90 MG: 90 TABLET ORAL at 22:05

## 2022-08-16 RX ADMIN — HYDROMORPHONE HYDROCHLORIDE 2 MG: 2 TABLET ORAL at 11:21

## 2022-08-16 RX ADMIN — ESCITALOPRAM OXALATE 10 MG: 10 TABLET ORAL at 08:31

## 2022-08-16 RX ADMIN — APIXABAN 5 MG: 5 TABLET, FILM COATED ORAL at 08:34

## 2022-08-16 RX ADMIN — INSULIN LISPRO 20 UNITS: 100 INJECTION, SOLUTION INTRAVENOUS; SUBCUTANEOUS at 11:01

## 2022-08-16 RX ADMIN — PREGABALIN 75 MG: 75 CAPSULE ORAL at 08:32

## 2022-08-16 RX ADMIN — PANTOPRAZOLE SODIUM 40 MG: 40 TABLET, DELAYED RELEASE ORAL at 06:27

## 2022-08-16 RX ADMIN — VANCOMYCIN HYDROCHLORIDE 750 MG: 750 INJECTION, SOLUTION INTRAVENOUS at 11:01

## 2022-08-16 RX ADMIN — ATORVASTATIN CALCIUM 40 MG: 40 TABLET, FILM COATED ORAL at 16:03

## 2022-08-16 RX ADMIN — INSULIN LISPRO 15 UNITS: 100 INJECTION, SOLUTION INTRAVENOUS; SUBCUTANEOUS at 16:02

## 2022-08-16 RX ADMIN — METOPROLOL SUCCINATE 50 MG: 50 TABLET, EXTENDED RELEASE ORAL at 17:16

## 2022-08-16 RX ADMIN — LIDOCAINE 5% 1 PATCH: 700 PATCH TOPICAL at 08:38

## 2022-08-16 RX ADMIN — HYDROMORPHONE HYDROCHLORIDE 2 MG: 2 TABLET ORAL at 19:27

## 2022-08-16 RX ADMIN — INSULIN LISPRO 5 UNITS: 100 INJECTION, SOLUTION INTRAVENOUS; SUBCUTANEOUS at 06:27

## 2022-08-16 RX ADMIN — METOPROLOL SUCCINATE 50 MG: 50 TABLET, EXTENDED RELEASE ORAL at 06:27

## 2022-08-16 RX ADMIN — Medication: at 08:40

## 2022-08-16 RX ADMIN — TICAGRELOR 90 MG: 90 TABLET ORAL at 08:31

## 2022-08-16 RX ADMIN — APIXABAN 5 MG: 5 TABLET, FILM COATED ORAL at 17:11

## 2022-08-16 RX ADMIN — INSULIN GLARGINE 20 UNITS: 100 INJECTION, SOLUTION SUBCUTANEOUS at 22:05

## 2022-08-16 RX ADMIN — Medication: at 17:13

## 2022-08-16 NOTE — OCCUPATIONAL THERAPY NOTE
Occupational Therapy Progress Note     Patient Name: Michelle Roa  OIMYG'G Date: 8/16/2022  Problem List  Principal Problem:    Empyema lung, Right  Active Problems:    Anxiety    Diabetic peripheral neuropathy (HCC)    Hyperlipidemia associated with type 2 diabetes mellitus ([facility] )    Hypertension    Type 2 diabetes mellitus with hyperglycemia ([facility])    A-fib ([facility])    CAD (coronary artery disease)    Tracheostomy in place ([facility])    Anemia    CKD (chronic kidney disease) stage 3, GFR 30-59 ml/min ([facility])    Acute Respiratory insufficiency on chronic hypoxic respiratory failure    Pain at Chest tube insertion site  Chest wall wound infection    Acute on chronic heart failure with preserved ejection fraction ([facility] )          08/16/22 0936   OT Last Visit   OT Visit Date 08/16/22   Note Type   Note Type Treatment   Restrictions/Precautions   Other Precautions Cardiac/sternal;Multiple lines;Telemetry; Fall Risk  (trach collar)   General   Response to Previous Treatment Patient with no complaints from previous session   Lifestyle   Autonomy Pt reports being I with ADLS, requires A with IADLS and uses RW for mobility PTA     Reciprocal Relationships Pt lives with supportive family who are able to assist   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, was a HHA   Intrinsic Gratification Spending time with her son   Pain Assessment   Pain Assessment Tool FLACC   Pain Rating: FLACC (Rest) - Face 0   Pain Rating: FLACC (Rest) - Legs 0   Pain Rating: FLACC (Rest) - Activity 0   Pain Rating: FLACC (Rest) - Cry 0   Pain Rating: FLACC (Rest) - Consolability 0   Score: FLACC (Rest) 0   Pain Rating: FLACC (Activity) - Face 1   Pain Rating: FLACC (Activity) - Legs 0   Pain Rating: FLACC (Activity) - Activity 0   Pain Rating: FLACC (Activity) - Cry 1   Pain Rating: FLACC (Activity) - Consolability 0   Score: FLACC (Activity) 2   ADL   Where Assessed Chair   Grooming Assistance 5  Supervision/Setup   Grooming Deficit Setup;Supervision/safety; Increased time to complete   UB Bathing Assistance 4  Minimal Assistance   UB Bathing Deficit Setup;Supervision/safety; Increased time to complete   UB Dressing Assistance 4  Minimal Assistance   UB Dressing Deficit Setup;Supervision/safety; Increased time to complete   LB Dressing Assistance 4  Minimal Assistance   LB Dressing Deficit Setup;Supervision/safety; Increased time to complete; Don/doff R sock; Don/doff L sock   Transfers   Sit to Stand 4  Minimal assistance   Additional items Assist x 1; Increased time required;Verbal cues   Stand to Sit 4  Minimal assistance   Additional items Assist x 1; Increased time required;Verbal cues   Functional Mobility   Functional Mobility 4  Minimal assistance   Additional Comments Pt demonstrated household mobility with one seated rest break  VC required for pacing  Additional items Rolling walker   Cognition   Overall Cognitive Status WFL   Arousal/Participation Alert; Cooperative   Attention Within functional limits   Orientation Level Oriented X4   Memory Within functional limits   Following Commands Follows one step commands without difficulty   Comments Pt is very pleasant and cooperative  Activity Tolerance   Activity Tolerance Patient limited by fatigue   Medical Staff Made Aware RN confirmed okay to see pt   Assessment   Assessment Patient participated in Skilled OT session this date with interventions consisting of ADL re training with the use of correct body mechanics and  therapeutic activities to: increase activity tolerance   Patient agreeable to OT treatment session, upon arrival patient was found seated OOB to Chair  In comparison to previous session, patient with improvements in endurance  Patient requiring frequent rest periods  Patient continues to be functioning below baseline level, occupational performance remains limited secondary to factors listed above and increased risk for falls and injury     From OT standpoint, recommendation at time of d/c would be Short Term Rehab  Patient to benefit from continued Occupational Therapy treatment while in the hospital to address deficits as defined above and maximize level of functional independence with ADLs and functional mobility  Plan   Treatment Interventions ADL retraining;Functional transfer training; Endurance training;Continued evaluation   Goal Expiration Date 08/22/22   OT Treatment Day 3   OT Frequency 3-5x/wk   Recommendation   OT Discharge Recommendation Post acute rehabilitation services   AM-PAC Daily Activity Inpatient   Lower Body Dressing 2   Bathing 3   Toileting 3   Upper Body Dressing 3   Grooming 3   Eating 4   Daily Activity Raw Score 18   Daily Activity Standardized Score (Calc for Raw Score >=11) 38 66   AM-PAC Applied Cognition Inpatient   Following a Speech/Presentation 3   Understanding Ordinary Conversation 4   Taking Medications 4   Remembering Where Things Are Placed or Put Away 4   Remembering List of 4-5 Errands 4   Taking Care of Complicated Tasks 3   Applied Cognition Raw Score 22   Applied Cognition Standardized Score 47 83   Modified Philo Scale   Modified Philo Scale 4       Riana Lopez, MOT, OTR/L

## 2022-08-16 NOTE — PROGRESS NOTES
Progress Note - Infectious Disease   Kelli Red 65 y o  female MRN: [id number]  Unit/Bed#: [facility] 429-01 Encounter: [id number]      Impression/Recommendations:  1  Probable right-sided empyema   CT show loculated right pleural effusion   Patient is status post placement of chest tube on 07/29, with pleural fluid parameters consistent with empyema   Culture had no growth, likely due to prior antibiotic   Given MRSA in right chest wound, MRSA is likely pathogen in empyema   Despite chest tube drainage, repeat chest CT on 08/02 showed persistent and unchanged loculated empyema   Patient is now status post VATS/decortication   Operative culture also had no growth, likely due to prolonged antibiotic prior to surgery   Chest tube was removed   Patient is clinically much improved  Continue IV vancomycin  Monitor temperature/WBC  Follow-up final operative culture  Treat x 2 weeks after VATS, through 8/20      2  Sepsis, with leukocytosis and tachypnea, likely secondary to empyema above   Patient is clinically and systemically improved   WBC has normalized   Tachypnea resolved  Raymond Basilio Antibiotic plan as in below  Monitor temperature/WBC  Monitor hemodynamics      3  MRSA right chest wall infection, as site recent/prior chest tube placement for spontaneous pneumothorax  Antibiotic plan as in above  Serial exams      4  Acute on chronic hypoxic respiratory failure, likely multifactorial   Respiratory culture with MRSA and Pseudomonas but no obvious pneumonia   These isolates are most likely airway colonization  Management per primary service      5  Recent spontaneous pneumothorax, status post chest tube placement on 07/20   This was removed on 07/24      6  DM, type 2, poorly controlled, with hyperglycemia and elevated hemoglobin A1c   This is risk factor for infection above  Management per primary service      7  MODESTO   Creatinine up and down, overall stable  Antibiotic dosages adjusted accordingly    Monitor creatinine      Discussed with patient in detail regarding the above plan  Plan for discharge to rehabilitation noted  Okay for discharge any time, from ID viewpoint      Antibiotics:  Vancomycin # 21  POD # 10     Subjective:  Patient's right-sided chest pain mild and controlled  Temperature stays down   No chills  She is tolerating antibiotic well   No nausea, vomiting or diarrhea      Objective:  Vitals:  Temp:  [98 1 °F (36 7 °C)-98 8 °F (37 1 °C)] 98 8 °F (37 1 °C)  HR:  [59-79] 59  Resp:  [18-19] 18  BP: (115-138)/(64-79) 130/73  SpO2:  [91 %-100 %] 100 %  Temp (24hrs), Av 4 °F (36 9 °C), Min:98 1 °F (36 7 °C), Max:98 8 °F (37 1 °C)  Current: Temperature: 98 8 °F (37 1 °C)    Physical Exam:     General: Awake, alert, cooperative, no distress  Neck:  Supple  No mass  No lymphadenopathy  Lungs: Decreased breath sounds on right, sparse right basilar rhonchi and rales, no wheezing, respirations unlabored  Heart:  Regular rate and rhythm, S1 and S2 normal, no murmur  Abdomen: Soft, nondistended, non-tender, bowel sounds active all four quadrants, no masses, no organomegaly  Extremities: Stable leg edema  No erythema/warmth  No draining ulcer  Nontender to palpation  Skin:  No rash  Neuro: Moves all extremities  Invasive Devices  Report    Peripherally Inserted Central Catheter Line  Duration           PICC Line 22 Right Brachial 5 days          Drain  Duration           External Urinary Catheter 2 days          Airway  Duration           Surgical Airway Shiley Fenestrated 167 days                Labs studies:   I have personally reviewed pertinent labs    Results from last 7 days   Lab Units 22  0452 08/15/22  0532 22  0448   POTASSIUM mmol/L 5 4* 5 1 4 6   CHLORIDE mmol/L 105 103 99   CO2 mmol/L 30 31 34*   BUN mg/dL 42* 42* 40*   CREATININE mg/dL 1 68* 1 72* 1 73*   EGFR ml/min/1 73sq m 33 32 32   CALCIUM mg/dL 8 7 8 9 8 4     Results from last 7 days   Lab Units 08/16/22  0452 08/11/22  0523 08/10/22  0406   WBC Thousand/uL 9 05 7 94 9 24   HEMOGLOBIN g/dL 8 3* 10 2* 9 7*   PLATELETS Thousands/uL 587* 484* 514*           Imaging Studies:   I have personally reviewed pertinent imaging study reports and images in PACS  EKG, Pathology, and Other Studies:   I have personally reviewed pertinent reports

## 2022-08-16 NOTE — ASSESSMENT & PLAN NOTE
Results from last 7 days   Lab Units 08/16/22  0452 08/11/22  0523 08/10/22  0406   HEMOGLOBIN g/dL 8 3* 10 2* 9 7*   HEMATOCRIT % 27 5* 34 9 30 5*     Most likely anemia of chronic disease due to prolonged illness  No active bleeding noted - Hb is stable  Hg (8/4): 6 8 s/p  5 unit PRBC's since this admission  Hb stable, monitor closely  Consider transfusing if < 8 due to history of CAD  Continue Iron replacement as well as daily miralax

## 2022-08-16 NOTE — ARC ADMISSION
Received phone call from Navid Monte at [facility] - patient has been denied by insurance medical director, Dr Kadi An, as she does not meet CMS criteria  Peer to peer has been offered to be completed by 4:30pm today by attending physician calling (P): [phone] option #2 with pending ref #: [id number]  SNF has also been approved for 48 hours  Patient does have option to file an appeal by calling (P): [phone], (F): [phone]  CM has been updated  Will continue to follow at this time  Notified by CM that P2P was completed and patient remained denied for inpatient acute rehab  They are now pursuing SNF rehab

## 2022-08-16 NOTE — PLAN OF CARE
Problem: PHYSICAL THERAPY ADULT  Goal: Performs mobility at highest level of function for planned discharge setting  See evaluation for individualized goals  Description: Treatment/Interventions: Functional transfer training, LE strengthening/ROM, Therapeutic exercise, Endurance training, Equipment eval/education, Bed mobility, Gait training, Spoke to nursing  Equipment Recommended: Yao Davis       See flowsheet documentation for full assessment, interventions and recommendations  Outcome: Progressing  Note: Prognosis: Good  Problem List: Decreased strength, Decreased endurance, Impaired balance, Decreased mobility, Decreased safety awareness, Decreased skin integrity, Pain  Assessment: Pt seen for skilled PT session w/ focus on t/f + gait training  Pt primarily limited by BLE weakness + fatigue  Decreased distance covered during gait training  Required someone to bring chair closer to pt on return trip to room for safety  CONNOLLY, however SpO2 97% and above  Continue to recommend rehab upon d/c   Barriers to Discharge: Inaccessible home environment     PT Discharge Recommendation: Post acute rehabilitation services    See flowsheet documentation for full assessment

## 2022-08-16 NOTE — ASSESSMENT & PLAN NOTE
Wt Readings from Last 3 Encounters:   08/16/22 79 6 kg (175 lb 7 8 oz)   08/02/22 77 7 kg (171 lb 6 4 oz)   07/19/22 78 2 kg (172 lb 6 4 oz)     · [facility] admitted to [facility] on [facility] with CHF exacerbation, now optimized  · Most recent EF 50% with normal systolic and diastolic function on 4/09/8815  Currently stable and euvolemic  · On Bumex 2 mg daily with Aldactone 100 mg daily - restarted on 7/29, Bumex d/c'd 8/13 and Aldactone decreased to 50mg   · Continue low-salt diet, strict I's and O's monitoring  · Per Cardiology at [facility] upon discharge    · Will need follow-up with General Cardiology & EP service upon discharge for ICD consideration

## 2022-08-16 NOTE — ASSESSMENT & PLAN NOTE
· Trach placed in the context of cardiac arrest/prolonged ventilator dependent state November 2021  · Decannulated at [facility] 12/11/21  · Patient requires frequent tracheostomy changes and suctioning  · Per discussion with speech therapy,  video barium swallow was done for sense of food getting stuck   Appropriate for regular diet  · On trach collar O2 with humidification and tolerating well btw 6-8L

## 2022-08-16 NOTE — PLAN OF CARE
Problem: OCCUPATIONAL THERAPY ADULT  Goal: Performs self-care activities at highest level of function for planned discharge setting  See evaluation for individualized goals  Description: Treatment Interventions: ADL retraining, Functional transfer training, UE strengthening/ROM, Endurance training, Patient/family training, Compensatory technique education, Continued evaluation, Energy conservation, Activityengagement          See flowsheet documentation for full assessment, interventions and recommendations  Outcome: Progressing  Note: Limitation: Decreased ADL status, Decreased endurance, Decreased self-care trans, Decreased high-level ADLs  Prognosis: Good  Assessment: Patient participated in Skilled OT session this date with interventions consisting of ADL re training with the use of correct body mechanics and  therapeutic activities to: increase activity tolerance   Patient agreeable to OT treatment session, upon arrival patient was found seated OOB to Chair  In comparison to previous session, patient with improvements in endurance  Patient requiring frequent rest periods  Patient continues to be functioning below baseline level, occupational performance remains limited secondary to factors listed above and increased risk for falls and injury  From OT standpoint, recommendation at time of d/c would be Short Term Rehab  Patient to benefit from continued Occupational Therapy treatment while in the hospital to address deficits as defined above and maximize level of functional independence with ADLs and functional mobility       OT Discharge Recommendation: Post acute rehabilitation services

## 2022-08-16 NOTE — QUICK NOTE
Family Update:    Received TT from  - patient approved at [facility]  Patient will require COVID card  Discussed with son Julianna Swanson) who states he will look for her COVID card at home with plans to bring tomorrow  Will follow up

## 2022-08-16 NOTE — ASSESSMENT & PLAN NOTE
Results from last 7 days   Lab Units 08/16/22  0452 08/15/22  0532 08/14/22  0448 08/13/22  0557 08/12/22  0442 08/11/22  0523 08/10/22  0406   BUN mg/dL 42* 42* 40* 37* 32* 34* 30*   CREATININE mg/dL 1 68* 1 72* 1 73* 1 71* 1 48* 1 44* 1 23   EGFR ml/min/1 73sq m 33 32 32 33 39 40 49     · MODESTO noted on initial admission to [facility] with elevated Cr 1 64, initially resolved  (Baseline Cr being 0 9-1 0)  · Cr now bumped again during admission, bumex d/c'd 8/13 - monitor off  · Continue to monitor closely   Losartan on hold  · Avoid nephrotoxic agents  · Follow up Cr  given Bumex hold > if still elevated, consider decreasing dose of aldactone to 50mg

## 2022-08-16 NOTE — ASSESSMENT & PLAN NOTE
· Recurrent loculated right pleural effusion  S/P right-sided chest tube insertion and removal on 07/24  · CT of the chest on 7/28/22 revealed moderate partially loculated right pleural effusion  · Right chest tube re-placed on 7/29/22 by IR  · Pleural fluid analysis shows neutrophil predominant WBC count  · Fluid cultures from prior anterior chest tube site positive for MRSA  · 8/1 - CXR: Small component of right pleural effusion suspected which may be partially loculated  Indwelling right thoracostomy tube without pneumothorax  · 8/2 CT Chest:  Decreased size of a right-sided pleural effusion and improved aeration of the right lower lobe post placement of a right pleural drainage catheter, with unchanged appearance of a loculated component of the effusion superomedially  There is slightly  increased density of the effusion compatible with a small amount of blood products  2   Appearance of new inflammatory groundglass opacities in the left lower lobe  3   Mild background interstitial edema is unchanged  · 8/6 - S/P VATS Decortation  procedure  · 8/9 - Apical chest tube removed by thoracic surgery   · 8/11 - patient medically cleared for discharge to [facility]  Awaiting insurance authorization     · 8/11- PICC line placed   Plan:  - Continue Abx w/ Vanc given MRSA, Vanc will need to be continued through 8/20 per ID  - Aggressive pulmonary toilet  - Monitor fever curve and white count closely - patient has remained afebrile with stable white count

## 2022-08-16 NOTE — PROGRESS NOTES
[address] [facility]  Progress Note - Pete Cervantes 1965, 65 y o  female MRN: [id number]  Unit/Bed#: [facility] 429-01 Encounter: [id number]  Primary Care Provider: Joey Paz MD   Date and time admitted to hospital: 8/2/2022  8:31 PM    * Empyema lung, Right  Assessment & Plan  · Recurrent loculated right pleural effusion  S/P right-sided chest tube insertion and removal on 07/24  · CT of the chest on 7/28/22 revealed moderate partially loculated right pleural effusion  · Right chest tube re-placed on 7/29/22 by IR  · Pleural fluid analysis shows neutrophil predominant WBC count  · Fluid cultures from prior anterior chest tube site positive for MRSA  · 8/1 - CXR: Small component of right pleural effusion suspected which may be partially loculated  Indwelling right thoracostomy tube without pneumothorax  · 8/2 CT Chest:  Decreased size of a right-sided pleural effusion and improved aeration of the right lower lobe post placement of a right pleural drainage catheter, with unchanged appearance of a loculated component of the effusion superomedially  There is slightly  increased density of the effusion compatible with a small amount of blood products  2   Appearance of new inflammatory groundglass opacities in the left lower lobe  3   Mild background interstitial edema is unchanged  · 8/6 - S/P VATS Decortation  procedure  · 8/9 - Apical chest tube removed by thoracic surgery   · 8/11 - patient medically cleared for discharge to [facility]  Awaiting insurance authorization     · 8/11- PICC line placed   Plan:  - Continue Abx w/ Vanc given MRSA, Vanc will need to be continued through 8/20 per ID  - Aggressive pulmonary toilet  - Monitor fever curve and white count closely - patient has remained afebrile with stable white count    Acute on chronic heart failure with preserved ejection fraction [facility])  Assessment & Plan  Wt Readings from Last 3 Encounters:   08/16/22 79 6 kg (175 lb 7 8 oz) 08/02/22 77 7 kg (171 lb 6 4 oz)   07/19/22 78 2 kg (172 lb 6 4 oz)     · [facility] admitted to [facility] on [facility] with CHF exacerbation, now optimized  · Most recent EF 50% with normal systolic and diastolic function on 2/64/3974  Currently stable and euvolemic  · On Bumex 2 mg daily with Aldactone 100 mg daily - restarted on 7/29, Bumex d/c'd 8/13 and Aldactone decreased to 50mg   · Continue low-salt diet, strict I's and O's monitoring  · Per Cardiology at [facility] upon discharge  · Will need follow-up with General Cardiology & EP service upon discharge for ICD consideration    Chest wall wound infection  Assessment & Plan  · Purulent discharge noted at site of recent chest tube placement  · Cultures positive for MRSA on 07/28 susceptible to vanc  · Currently on IV vancomycin - will continue thru 8/20 per ID  · Continue local wound care/dressing changes     Pain at Chest tube insertion site  Assessment & Plan  · Improving  · Prior complains of intractable pain at prior chest tube site on right anterior chest wall  This was removed 7/24/22  · Was followed by APS back in [employer], S/P epidural catheter which was removed 8/2  · Current pain regimen: Diaz Tylenol 975 Q8h, PO Dilaudid 2/4 for mod/severe, IV Dilaudid 0 5 mg Q2  · Bowel regimen:  Scheduled senna and MiraLax daily  · Both chest tubes now removed - last chest tube removed on 8/10 by CTS     Acute Respiratory insufficiency on chronic hypoxic respiratory failure  Assessment & Plan  · She is status post trach   Currently on 6-8L with sats in the high 90s, at home uses 10 L  · Status post recent right pneumothorax requiring chest tube and now with right empyema  · Continue aggressive respiratory protocol, p r n  suctioning  · Continue Xoponex and Atrovent per Pulm q6hrs  · Encourage out of bed, incentive spirometry  · Pulmonology following      CKD (chronic kidney disease) stage 3, GFR 30-59 ml/min ([facility])  Assessment & Plan Results from last 7 days   Lab Units 08/16/22  0452 08/15/22  0532 08/14/22  0448 08/13/22  0557 08/12/22  0442 08/11/22  0523 08/10/22  0406   BUN mg/dL 42* 42* 40* 37* 32* 34* 30*   CREATININE mg/dL 1 68* 1 72* 1 73* 1 71* 1 48* 1 44* 1 23   EGFR ml/min/1 73sq m 33 32 32 33 39 40 49     · MODESTO noted on initial admission to [facility] with elevated Cr 1 64, initially resolved  (Baseline Cr being 0 9-1 0)  · Cr now bumped again during admission, bumex d/c'd 8/13 - monitor off  · Continue to monitor closely  Losartan on hold  · Avoid nephrotoxic agents  · Follow up Cr  given Bumex hold > if still elevated, consider decreasing dose of aldactone to 50mg    Anemia  Assessment & Plan    Results from last 7 days   Lab Units 08/16/22  0452 08/11/22  0523 08/10/22  0406   HEMOGLOBIN g/dL 8 3* 10 2* 9 7*   HEMATOCRIT % 27 5* 34 9 30 5*     Most likely anemia of chronic disease due to prolonged illness  No active bleeding noted - Hb is stable  Hg (8/4): 6 8 s/p  5 unit PRBC's since this admission  Hb stable, monitor closely  Consider transfusing if < 8 due to history of CAD  Continue Iron replacement as well as daily miralax     Tracheostomy in place [facility])  Assessment & Plan  · Trach placed in the context of cardiac arrest/prolonged ventilator dependent state November 2021  · Decannulated at [facility] 12/11/21  · Patient requires frequent tracheostomy changes and suctioning  · Per discussion with speech therapy,  video barium swallow was done for sense of food getting stuck  Appropriate for regular diet  · On trach collar O2 with humidification and tolerating well btw 6-8L     CAD (coronary artery disease)  Assessment & Plan  · STEMI 10/22/2021 s/p PATRICA mid circumflex OM1  OM2 was ballooned but not stented    · Pulseless VT 10/27/21 - repeat LHC 90% mid circumflex stenosis, but could not be intervened on  · VT 10/28/21 LHC:  found to have apical LAD complete occlusion was felt to be small to be intervened    · Cardiac carrest 1/1/22 - [facility] felt to be hypoxic vs  VT induced  · On metoprolol 50mg BID, Brilinta 90 mg BID and Lipitor 40 mg qd     A-fib [facility])  Assessment & Plan  Follows closely with Cardiology  Rhythm controlled with amiodarone 100 mg daily and AC with Eliquis 5 mg BID    Type 2 diabetes mellitus with hyperglycemia [facility])  Assessment & Plan  Lab Results   Component Value Date    HGBA1C 12 7 (H) 05/22/2022       Recent Labs     08/15/22  1032 08/15/22  1608 08/15/22  2153 08/16/22  0538   POCGLU 316* 344* 163* 187*     Uncontrolled per most recent A1c  Home regimen: Lantus 16U QHS and Novolog 4U TID  Previous inpatient regimen: Lantus 45U qHS + Humalog 22U TID + SSI 4  Current regimen: Lantus 20 units QHS and SSI 6  Will monitor closely and possibly increase Lantus depending on coverage  Will continue for now and adjust as needed to maintain  - 180      Hypertension  Assessment & Plan  BP stable  On home losartan 50 mg BID, follows closely with Cardiology  Losartan on hold  Monitor BP closely     Hyperlipidemia associated with type 2 diabetes mellitus (HCC)  Assessment & Plan  - Continue Atorvastatin 40 mg daily  Diabetic peripheral neuropathy (HCC)  Assessment & Plan  Continue PTA Lyrica as documented     Anxiety  Assessment & Plan  On Lexapro 10 mg daily as well as Atarax prn           Subjective/Objective     Subjective:   Patient seen and examined at bedside  No overnight events  Patient reports doing okay  Pain at chest tube site improving  Her appetite is improving and she had a bowel movement yesterday  Objective:  Vitals: Blood pressure 115/64, pulse 59, temperature 98 4 °F (36 9 °C), temperature source Oral, resp  rate 18, weight 79 6 kg (175 lb 7 8 oz), SpO2 98 %  ,Body mass index is 25 91 kg/m²        Intake/Output Summary (Last 24 hours) at 8/16/2022 0936  Last data filed at 8/16/2022 0837  Gross per 24 hour   Intake 240 ml   Output 1118 ml   Net -878 ml       Invasive Devices  Report Peripherally Inserted Central Catheter Line  Duration           PICC Line 08/11/22 Right Brachial 5 days          Drain  Duration           External Urinary Catheter 2 days          Airway  Duration           Surgical Airway Shiley Fenestrated 167 days              Physical Exam  Vitals reviewed  Constitutional:       General: She is not in acute distress      Appearance: She is not ill-appearing  HENT:      Head: Normocephalic and atraumatic       Right Ear: External ear normal       Left Ear: External ear normal       Mouth/Throat:      Mouth: Mucous membranes are moist    Eyes:      Conjunctiva/sclera: Conjunctivae normal    Cardiovascular:      Rate and Rhythm: Normal rate and regular rhythm       Heart sounds: Normal heart sounds  Pulmonary:      Effort: Pulmonary effort is normal  No respiratory distress       Comments: Trach collar in place  Chest:      Comments: CT site clean  Abdominal:      General: Bowel sounds are normal       Palpations: Abdomen is soft       Tenderness: There is no abdominal tenderness  Musculoskeletal:         General: No swelling or deformity       Cervical back: Normal range of motion       Right lower leg: No edema       Left lower leg: No edema  Skin:     General: Skin is warm       Coloration: Skin is not pale       Findings: No erythema or rash  Neurological:      Mental Status: She is alert           Lab, Imaging and other studies: I have personally reviewed pertinent reports       Results from last 7 days   Lab Units 08/16/22  0452 08/11/22  0523 08/10/22  0406   WBC Thousand/uL 9 05 7 94 9 24   HEMOGLOBIN g/dL 8 3* 10 2* 9 7*   HEMATOCRIT % 27 5* 34 9 30 5*   PLATELETS Thousands/uL 587* 484* 514*   NEUTROS PCT %  --  74 74   MONOS PCT %  --  9 10     Results from last 7 days   Lab Units 08/16/22  0452 08/15/22  0532 08/14/22  0448   POTASSIUM mmol/L 5 4* 5 1 4 6   CHLORIDE mmol/L 105 103 99   CO2 mmol/L 30 31 34*   BUN mg/dL 42* 42* 40*   CREATININE mg/dL 1 68* 1 72* 1 73*   CALCIUM mg/dL 8 7 8 9 8 4         Lab Results   Component Value Date    PHOS 4 6 (H) 07/18/2022    PHOS 3 9 07/17/2022    PHOS 4 2 07/16/2022              0   Lab Value Date/Time    TROPONINI >40 00 (H) 10/24/2021 0839    TROPONINI >40 00 (H) 10/22/2021 1851    TROPONINI >40 00 (H) 10/22/2021 1442    TROPONINI <0 03 08/26/2021 0802    TROPONINI <0 03 04/18/2021 1413    TROPONINI <0 02 11/17/2020 1537    TROPONINI <0 03 07/26/2020 1821    TROPONINI <0 02 06/02/2020 2250    TROPONINI <0 02 06/02/2020 1952    TROPONINI <0 02 03/04/2019 2122    TROPONINI <0 02 10/07/2018 1516    TROPONINI <0 02 08/12/2018 0042    TROPONINI <0 02 08/11/2018 2058    TROPONINI <0 02 08/11/2018 1543    TROPONINI <0 02 08/10/2018 1912     ABG:  Lab Results   Component Value Date    PHART 7 378 08/07/2022    BFX1WBB 46 0 (H) 08/07/2022    PO2ART 70 3 (L) 08/07/2022    WKC9WQU 26 5 08/07/2022    BEART 1 1 08/07/2022    SOURCE Line, Arterial 08/07/2022     VTE Pharmacologic Prophylaxis: Reason for no pharmacologic prophylaxis paitent on Eliquis     VTE Mechanical Prophylaxis: sequential compression device

## 2022-08-16 NOTE — ASSESSMENT & PLAN NOTE
Lab Results   Component Value Date    HGBA1C 12 7 (H) 05/22/2022       Recent Labs     08/15/22  1032 08/15/22  1608 08/15/22  2153 08/16/22  0538   POCGLU 316* 344* 163* 187*     Uncontrolled per most recent A1c  Home regimen: Lantus 16U QHS and Novolog 4U TID  Previous inpatient regimen: Lantus 45U qHS + Humalog 22U TID + SSI 4  Current regimen: Lantus 20 units QHS and SSI 6  Will monitor closely and possibly increase Lantus depending on coverage  Will continue for now and adjust as needed to maintain  - 180

## 2022-08-16 NOTE — PHYSICAL THERAPY NOTE
Physical Therapy Treatment Note    Patient's Name: Sharad Washington  : 22 1429   PT Last Visit   PT Visit Date 22   Note Type   Note Type Treatment   Pain Assessment   Pain Assessment Tool 0-10   Pain Score No Pain   Restrictions/Precautions   Weight Bearing Precautions Per Order No   Other Precautions Cardiac/sternal;Contact/isolation;Multiple lines;Telemetry; Fall Risk  (trach collar - Venturi used for ambulation)   General   Chart Reviewed Yes   Response to Previous Treatment Patient with no complaints from previous session  Family/Caregiver Present No   Subjective   Subjective Pt agreeable to mobilize  Bed Mobility   Supine to Sit Unable to assess   Sit to Supine Unable to assess   Additional Comments Pt greeted seated in chair  Transfers   Sit to Stand 5  Supervision   Additional items Assist x 1; Armrests; Increased time required;Verbal cues   Stand to Sit 2  Maximal assistance   Additional items Assist x 1; Increased time required;Verbal cues   Additional Comments RW; pt required MaxAx1 to pivot into chair at end of gait training for safety due to LE weakness  Ambulation/Elevation   Gait pattern Excessively slow; Short stride; Foward flexed;Knees flexed;Decreased heel strike  (foot drop LLE)   Gait Assistance   (Isaac->ModA w/ fatigue)   Additional items Assist x 1;Verbal cues; Tactile cues   Assistive Device Rolling walker   Distance 20'   Stair Management Assistance Not tested   Ambulation/Elevation Additional Comments Pt started to take several standing rests leaning on RW w/ forearms despite instructions not to do this  Pt taking steps w/ knees + trunk  excessively flexed  Required assistance of 2nd person to assist into chair     Balance   Static Sitting Fair +   Dynamic Sitting Fair   Static Standing Poor +   Dynamic Standing Poor   Ambulatory Poor  (RW)   Endurance Deficit   Endurance Deficit Yes   Endurance Deficit Description LE weakness/fatigue, CONNOLLY (SpO2 97% and above on trach collar)   Activity Tolerance   Activity Tolerance Patient limited by fatigue   Nurse Made Aware yes - cleared for PT   Assessment   Prognosis Good   Problem List Decreased strength;Decreased endurance; Impaired balance;Decreased mobility; Decreased safety awareness;Decreased skin integrity;Pain   Assessment Pt seen for skilled PT session w/ focus on t/f + gait training  Pt primarily limited by BLE weakness + fatigue  Decreased distance covered during gait training  Required someone to bring chair closer to pt on return trip to room for safety  CONNOLLY, however SpO2 97% and above  Continue to recommend rehab upon d/c    Barriers to Discharge Inaccessible home environment   Goals   Patient Goals none stated   PT Treatment Day 6   Plan   Treatment/Interventions Functional transfer training;LE strengthening/ROM; Therapeutic exercise; Endurance training;Patient/family training;Equipment eval/education; Bed mobility;Gait training; Compensatory technique education;Spoke to nursing  (balance training)   Progress Slow progress, decreased activity tolerance   PT Frequency   (3-6x/wk)   Recommendation   PT Discharge Recommendation Post acute rehabilitation services   Equipment Recommended [address] [facility] Recommended Wheeled walker   Change/add to [employer]? No   AM-PAC Basic Mobility Inpatient   Turning in Bed Without Bedrails 3   Lying on Back to Sitting on Edge of Flat Bed 3   Moving Bed to Chair 3   Standing Up From Chair 3   Walk in Room 2   Climb 3-5 Stairs 1   Basic Mobility Inpatient Raw Score 15   Basic Mobility Standardized Score 36 97   Highest Level Of Mobility   JH-HLM Goal 4: Move to chair/commode   JH-HLM Achieved 6: Walk 10 steps or more   Education   Education Provided Mobility training;Assistive device   Patient Reinforcement needed   End of Consult   Patient Position at End of Consult Bedside chair; All needs within reach  (on waffle cushion, all lines in tact)     Reynaldo Mckinnon, PT, DPT

## 2022-08-16 NOTE — CASE MANAGEMENT
Case Management Progress Note    Patient name Joslyn Oconnor  Location [facility] 429/[facility] 429-01 MRN [id number]  : 1965 Date 2022       LOS (days): 14  Geometric Mean LOS (GMLOS) (days): 9 60  Days to GMLOS:-4 1        Pt's LOS is 14 days  Pt is medically ready for d/c  Pt had been recommended for acute rehab placement for her aftercare plan, and was referred to and accepted for placement at [facility] admissions submitted for authorization for placement from pt's [facility]; however, the insurance denied acute rehab placement for pt's aftercare, stating pt did not meet criteria for this level of aftercare  A [id number] appeal was performed by [facility] to reverse this denial, but the appeal was unsuccessful  Pt's insurance approved pt for SNF rehab placement as an alternative to acute rehab  CM made a blanket referral to all SNFs within the local area that are in network w/ pt's insurance plan  Of all the SNFs that pt was referred to, only [facility] located in [facility] expressed interested in accepting the pt for rehab placement, pending pt's ability to provide proof of her vaccination again COVID-19  CM spoke to pt about this  Pt reported she is vaccinated against COVID-19, but not boosted yet  Pt reported she does not have her COVID-19 Vaccination Card w/ her here in the hospital, and that her family will have to be contacted to request to find it for her  CM informed Family Medicine resident Dr Grayson Houser of all this  CM to follow

## 2022-08-16 NOTE — ASSESSMENT & PLAN NOTE
· Improving  · Prior complains of intractable pain at prior chest tube site on right anterior chest wall    This was removed 7/24/22  · Was followed by APS back in [facility], S/P epidural catheter which was removed 8/2  · Current pain regimen: Diaz Tylenol 975 Q8h, PO Dilaudid 2/4 for mod/severe, IV Dilaudid 0 5 mg Q2  · Bowel regimen:  Scheduled senna and MiraLax daily  · Both chest tubes now removed - last chest tube removed on 8/10 by CTS

## 2022-08-16 NOTE — ASSESSMENT & PLAN NOTE
· STEMI 10/22/2021 s/p PATRICA mid circumflex OM1  OM2 was ballooned but not stented    · Pulseless VT 10/27/21 - repeat LHC 90% mid circumflex stenosis, but could not be intervened on  · VT 10/28/21 LHC:  found to have apical LAD complete occlusion was felt to be small to be intervened    · Cardiac carrest 1/1/22 - [facility] felt to be hypoxic vs  VT induced  · On metoprolol 50mg BID, Brilinta 90 mg BID and Lipitor 40 mg qd

## 2022-08-16 NOTE — PROGRESS NOTES
Vancomycin IV Pharmacy-to-Dose Consultation     Giacomo [place] is a 65 y o  female who is currently receiving Vancomycin IV with management by the Pharmacy Consult service for the treatment of empyema  Assessment/Plan:     The patient's chart was reviewed  Renal function was evaluated  Scr today of 1 68, which is still up from Scr on 08/10 of 1 23  Patient's most recent vancomycin random level of 15 5 with morning labs (08/16) is within goal of 15-20  There are no signs or symptoms of infusion reactions documented  Based on todays assessment, we will order a one time vancomycin 750 mg IV dose (day 15 of therapy)  We will collect a random vancomycin level tomorrow (08/17) with AM labs to assess if patient is still within therapeutic range  Per ID treatment through 8/20  Pharmacy will continue to follow closely for s/sx of nephrotoxicity, infusion reactions and appropriateness of therapy  BMP will be ordered per protocol  We will continue to follow the patients culture results and clinical progress daily      Ac Pugh

## 2022-08-17 LAB
ANION GAP SERPL CALCULATED.3IONS-SCNC: 3 MMOL/L (ref 4–13)
BUN SERPL-MCNC: 43 MG/DL (ref 5–25)
CALCIUM SERPL-MCNC: 8.5 MG/DL (ref 8.3–10.1)
CHLORIDE SERPL-SCNC: 107 MMOL/L (ref 96–108)
CO2 SERPL-SCNC: 29 MMOL/L (ref 21–32)
CREAT SERPL-MCNC: 1.89 MG/DL (ref 0.6–1.3)
GFR SERPL CREATININE-BSD FRML MDRD: 29 ML/MIN/1.73SQ M
GLUCOSE SERPL-MCNC: 147 MG/DL (ref 65–140)
GLUCOSE SERPL-MCNC: 158 MG/DL (ref 65–140)
GLUCOSE SERPL-MCNC: 249 MG/DL (ref 65–140)
GLUCOSE SERPL-MCNC: 269 MG/DL (ref 65–140)
GLUCOSE SERPL-MCNC: 351 MG/DL (ref 65–140)
GLUCOSE SERPL-MCNC: 437 MG/DL (ref 65–140)
GLUCOSE SERPL-MCNC: 448 MG/DL (ref 65–140)
POTASSIUM SERPL-SCNC: 5.4 MMOL/L (ref 3.5–5.3)
SODIUM SERPL-SCNC: 139 MMOL/L (ref 135–147)
VANCOMYCIN SERPL-MCNC: 20.1 UG/ML (ref 10–20)

## 2022-08-17 PROCEDURE — 91305 COVID-19 PFIZER VAC (TRIS SUCROSE, GRAY CAP FORM) 30 MCG/0.3ML SUSP: CPT | Performed by: FAMILY MEDICINE

## 2022-08-17 PROCEDURE — 97116 GAIT TRAINING THERAPY: CPT

## 2022-08-17 PROCEDURE — 99232 SBSQ HOSP IP/OBS MODERATE 35: CPT | Performed by: INTERNAL MEDICINE

## 2022-08-17 PROCEDURE — 80048 BASIC METABOLIC PNL TOTAL CA: CPT | Performed by: SURGERY

## 2022-08-17 PROCEDURE — 80202 ASSAY OF VANCOMYCIN: CPT | Performed by: SURGERY

## 2022-08-17 PROCEDURE — 94760 N-INVAS EAR/PLS OXIMETRY 1: CPT

## 2022-08-17 PROCEDURE — 97530 THERAPEUTIC ACTIVITIES: CPT

## 2022-08-17 PROCEDURE — 99231 SBSQ HOSP IP/OBS SF/LOW 25: CPT | Performed by: FAMILY MEDICINE

## 2022-08-17 PROCEDURE — 97535 SELF CARE MNGMENT TRAINING: CPT

## 2022-08-17 PROCEDURE — 97110 THERAPEUTIC EXERCISES: CPT

## 2022-08-17 PROCEDURE — 82948 REAGENT STRIP/BLOOD GLUCOSE: CPT

## 2022-08-17 RX ORDER — INSULIN GLARGINE 100 [IU]/ML
25 INJECTION, SOLUTION SUBCUTANEOUS
Status: DISCONTINUED | OUTPATIENT
Start: 2022-08-17 | End: 2022-08-18 | Stop reason: HOSPADM

## 2022-08-17 RX ORDER — INSULIN LISPRO 100 [IU]/ML
15 INJECTION, SOLUTION INTRAVENOUS; SUBCUTANEOUS ONCE
Status: COMPLETED | OUTPATIENT
Start: 2022-08-17 | End: 2022-08-17

## 2022-08-17 RX ORDER — INSULIN LISPRO 100 [IU]/ML
5-25 INJECTION, SOLUTION INTRAVENOUS; SUBCUTANEOUS
Status: DISCONTINUED | OUTPATIENT
Start: 2022-08-17 | End: 2022-08-18 | Stop reason: HOSPADM

## 2022-08-17 RX ADMIN — APIXABAN 5 MG: 5 TABLET, FILM COATED ORAL at 08:23

## 2022-08-17 RX ADMIN — HYDROMORPHONE HYDROCHLORIDE 2 MG: 2 TABLET ORAL at 21:16

## 2022-08-17 RX ADMIN — INSULIN LISPRO 15 UNITS: 100 INJECTION, SOLUTION INTRAVENOUS; SUBCUTANEOUS at 11:15

## 2022-08-17 RX ADMIN — INSULIN LISPRO 10 UNITS: 100 INJECTION, SOLUTION INTRAVENOUS; SUBCUTANEOUS at 21:10

## 2022-08-17 RX ADMIN — METOPROLOL SUCCINATE 50 MG: 50 TABLET, EXTENDED RELEASE ORAL at 06:13

## 2022-08-17 RX ADMIN — TICAGRELOR 90 MG: 90 TABLET ORAL at 08:23

## 2022-08-17 RX ADMIN — LIDOCAINE 5% 1 PATCH: 700 PATCH TOPICAL at 08:22

## 2022-08-17 RX ADMIN — PANTOPRAZOLE SODIUM 40 MG: 40 TABLET, DELAYED RELEASE ORAL at 06:13

## 2022-08-17 RX ADMIN — TICAGRELOR 90 MG: 90 TABLET ORAL at 21:10

## 2022-08-17 RX ADMIN — ATORVASTATIN CALCIUM 40 MG: 40 TABLET, FILM COATED ORAL at 17:43

## 2022-08-17 RX ADMIN — MIRTAZAPINE 7.5 MG: 15 TABLET, FILM COATED ORAL at 21:10

## 2022-08-17 RX ADMIN — INSULIN LISPRO 15 UNITS: 100 INJECTION, SOLUTION INTRAVENOUS; SUBCUTANEOUS at 19:47

## 2022-08-17 RX ADMIN — APIXABAN 5 MG: 5 TABLET, FILM COATED ORAL at 17:43

## 2022-08-17 RX ADMIN — PREGABALIN 25 MG: 25 CAPSULE ORAL at 17:43

## 2022-08-17 RX ADMIN — SPIRONOLACTONE 50 MG: 50 TABLET, FILM COATED ORAL at 08:23

## 2022-08-17 RX ADMIN — AMIODARONE HYDROCHLORIDE 100 MG: 200 TABLET ORAL at 08:23

## 2022-08-17 RX ADMIN — VANCOMYCIN HYDROCHLORIDE 750 MG: 750 INJECTION, SOLUTION INTRAVENOUS at 13:20

## 2022-08-17 RX ADMIN — Medication: at 17:45

## 2022-08-17 RX ADMIN — METOPROLOL SUCCINATE 50 MG: 50 TABLET, EXTENDED RELEASE ORAL at 17:43

## 2022-08-17 RX ADMIN — PREGABALIN 75 MG: 75 CAPSULE ORAL at 08:23

## 2022-08-17 RX ADMIN — ESCITALOPRAM OXALATE 10 MG: 10 TABLET ORAL at 08:23

## 2022-08-17 RX ADMIN — INSULIN GLARGINE 25 UNITS: 100 INJECTION, SOLUTION SUBCUTANEOUS at 21:10

## 2022-08-17 RX ADMIN — BNT162B2 0.3 ML: 0.23 INJECTION, SUSPENSION INTRAMUSCULAR at 17:43

## 2022-08-17 RX ADMIN — INSULIN LISPRO 25 UNITS: 100 INJECTION, SOLUTION INTRAVENOUS; SUBCUTANEOUS at 17:43

## 2022-08-17 NOTE — ASSESSMENT & PLAN NOTE
· Recurrent loculated right pleural effusion  S/P right-sided chest tube insertion and removal on 07/24  · CT of the chest on 7/28/22 revealed moderate partially loculated right pleural effusion  · Right chest tube re-placed on 7/29/22 by IR  · Pleural fluid analysis shows neutrophil predominant WBC count  · Fluid cultures from prior anterior chest tube site positive for MRSA  · 8/1 - CXR: Small component of right pleural effusion suspected which may be partially loculated  Indwelling right thoracostomy tube without pneumothorax  · 8/2 CT Chest:  Decreased size of a right-sided pleural effusion and improved aeration of the right lower lobe post placement of a right pleural drainage catheter, with unchanged appearance of a loculated component of the effusion superomedially  There is slightly  increased density of the effusion compatible with a small amount of blood products  2   Appearance of new inflammatory groundglass opacities in the left lower lobe  3   Mild background interstitial edema is unchanged  · 8/6 - S/P VATS Decortation  procedure  · 8/9 - Apical chest tube removed by thoracic surgery   · 8/11 - patient medically cleared for discharge to [facility]  Awaiting insurance authorization     · 8/11- PICC line placed   Plan:  - Continue Abx w/ Vanc given MRSA, Vanc will need to be continued through 8/20 per ID  - Aggressive pulmonary toilet  - Monitor fever curve and white count closely - patient has remained afebrile with stable white count

## 2022-08-17 NOTE — PLAN OF CARE
Problem: PHYSICAL THERAPY ADULT  Goal: Performs mobility at highest level of function for planned discharge setting  See evaluation for individualized goals  Description: Treatment/Interventions: Functional transfer training, LE strengthening/ROM, Therapeutic exercise, Endurance training, Equipment eval/education, Bed mobility, Gait training, Spoke to nursing  Equipment Recommended: Obie Castleman       See flowsheet documentation for full assessment, interventions and recommendations  Outcome: Progressing  Note: Prognosis: Good  Problem List: Decreased strength, Decreased endurance, Impaired balance, Decreased mobility, Decreased safety awareness, Decreased skin integrity, Pain  Assessment: Pt seen for PT session w/ focus on t/f training, gait training, + ther ex instruction  Second person present during mobility portion of session for safety w/ chair follow  Pt able to tolerate 20 more feet after seated rest x3 min  Pt able to tolerate seated HEP - encouraged to perform 2 more times later today  From a PT standpoint continue to recommend rehab upon d/c  Goals , reviewed + extended as they still remain appropriate  Barriers to Discharge: Inaccessible home environment     PT Discharge Recommendation: Post acute rehabilitation services    See flowsheet documentation for full assessment

## 2022-08-17 NOTE — CASE MANAGEMENT
Case Management Discharge Planning Note    Patient name Michelle Roa  Location [facility] 429/[facility] 100-93 MRN [id number]  : 1965 Date 2022       Current Admission Date: 2022  Current Admission Diagnosis:Empyema lung, Right   Patient Active Problem List    Diagnosis Date Noted    Empyema lung, Right 2022    Chest wall wound infection 2022    Acute on chronic heart failure with preserved ejection fraction ([address] ) 2022    Pain at Chest tube insertion site    2022    Acute Respiratory insufficiency on chronic hypoxic respiratory failure 2022    Leukocytosis 2022    Hyponatremia 2022    CKD (chronic kidney disease) stage 3, GFR 30-59 ml/min ([facility]) 2022    Hemoptysis 2022    Hypomagnesemia 2022    Chest pain 2022    Moderate protein-calorie malnutrition ([facility] ) 2022    Thoracic aortic aneurysm, ruptured ([facility] ) 2022    Shortness of breath 2022    Prolonged Q-T interval on ECG 2022    Elevated troponin 2022    Anemia 2022    Insomnia secondary to anxiety 2022    Chronic hypoxemic respiratory failure ([facility] ) 03/15/2022    Trache[facility] in place [facility]) 2022    Subglottic stenosis 2022    CAD (coronary artery disease) 2021    Urinary retention 2021    Cerebral aneurysm 2021    Cerebral vascular accident ([address] ) 2021    Acute on chronic respiratory failure with hypoxia ([address] ) 2021    History of Cardiac arrest ([address] ) 2021    A-fib ([address] ) 10/30/2021    History of Ventricular tachycardia ([address] ) 10/27/2021    MODESTO (acute kidney injury) ([facility] ) 10/24/2021    Cellulitis of left lower extremity 2021    Hypoglycemia 2021    Polypharmacy 2021    Trigger finger, right middle finger 2021    Trigger finger, right ring finger 2021    Diarrhea 2021    Nasal vestibulitis 2021    Orthopnea 2021    CHANTALE (obstructive sleep apnea) 01/14/2021    Palpitations 11/17/2020    Abscess 10/11/2020    Bacterial vaginosis 07/09/2020    Urinary tract infection with hematuria 02/03/2020    Epigastric pain 07/02/2019    Thoracic aortic aneurysm without rupture (HCC) 08/12/2018    Hypokalemia 08/11/2018    Abnormal urinalysis 08/11/2018    Vaginal discharge 04/12/2018    Yeast vaginitis 04/12/2018    Recurrent vaginitis 04/12/2018    Cardiac murmur 12/15/2017    Primary insomnia 12/15/2017    Anxiety 07/26/2017    Depression, recurrent ([facility] ) 07/26/2017    Retinal hemorrhage due to secondary diabetes ([facility] ) 07/26/2017    Fibromyalgia 07/26/2017    Hypertension 07/26/2017    Hypoestrogenism 07/26/2017    Neuropathy 07/26/2017    Chronic pain disorder 06/05/2017    Medically complex patient 06/05/2017    Change in bowel habit 04/24/2017    Screening for colon cancer 12/05/2016    Cough 02/15/2016    Non compliance with medical treatment 01/27/2016    Dizziness 01/26/2016    Gastroesophageal reflux disease with esophagitis 01/26/2016    Vertigo 01/26/2016    Vertebral body hemangioma 08/21/2015    Hemangioma of bone 07/30/2015    Right-sided thoracic back pain 07/23/2015    Thoracic radiculitis 07/23/2015    Carpal tunnel syndrome of left wrist 06/26/2015    Tobacco abuse 06/26/2015    Type 2 diabetes mellitus with hyperglycemia ([facility] ) 06/09/2015    Hyperlipidemia associated with type 2 diabetes mellitus ([facility] ) 05/06/2015    Noncompliance with diet and medication regimen 05/06/2015    Shingles 03/25/2015    Diabetic peripheral neuropathy ([facility] ) 02/25/2015    Low back pain 02/25/2015    Lumbar radiculopathy 02/25/2015    Overweight 02/25/2015    Sciatica of left side 02/25/2015      LOS (days): 15  Geometric Mean LOS (GMLOS) (days): 9 60  Days to GMLOS:-5 1     OBJECTIVE:  Risk of Unplanned Readmission Score: 68 4         Current admission status: Inpatient   Preferred Pharmacy:   [id number] [place] [facility] [address] [facility]  [address] [facility]  [address]  Phone: [phone] Fax: [address], [address] [facility] [address]  [address]  [place] [address]  Phone: [phone] Fax: [phone]    [place] [address] #[id number] - [address], [facility] [address] [place] [address] [facility] [address]  Phone: [phone] Fax: [phone] [facility], [facility] [address] [place] [address] [place] [facility] [address] [facility]  Phone: [phone] Fax: [phone]    Primary Care Provider: Manuel Waddell MD    Primary Insurance: [facility] REP  Secondary Insurance: [address] Number: submitted SNF auth request to [place] [employer] via phone  pending ref # [id number] for [address]   NPI: [id number]  Dr Min Sequeira  NPI: [id number]   Clinicals faxed to [phone]

## 2022-08-17 NOTE — ASSESSMENT & PLAN NOTE
· Improving  · Prior complains of intractable pain at prior chest tube site on right anterior chest wall    This was removed 7/24/22  · Was followed by APS back in [facility], S/P epidural catheter which was removed 8/2  · Current pain regimen: Diaz Tylenol 975 Q8h, PO Dilaudid 2/4 for mod/severe, IV Dilaudid 0 5 mg Q2  · Bowel regimen:  Scheduled senna and MiraLax daily  · Both chest tubes now removed - last chest tube removed on 8/10 by CTS

## 2022-08-17 NOTE — ASSESSMENT & PLAN NOTE
Wt Readings from Last 3 Encounters:   08/17/22 80 kg (176 lb 5 9 oz)   08/02/22 77 7 kg (171 lb 6 4 oz)   07/19/22 78 2 kg (172 lb 6 4 oz)     · Initially admitted to [facility] on 07/15 with CHF exacerbation, now optimized  · Most recent EF 50% with normal systolic and diastolic function on 9/40/5745  Currently stable and euvolemic  · On Bumex 2 mg daily with Aldactone 100 mg daily - restarted on 7/29, Bumex d/c'd 8/13 and Aldactone decreased to 50mg   · Continue low-salt diet, strict I's and O's monitoring  · Per Cardiology at [facility] upon discharge    · Will need follow-up with General Cardiology & EP service upon discharge for ICD consideration

## 2022-08-17 NOTE — ASSESSMENT & PLAN NOTE
· STEMI 10/22/2021 s/p PATRICA mid circumflex OM1  OM2 was ballooned but not stented    · Pulseless VT 10/27/21 - repeat LHC 90% mid circumflex stenosis, but could not be intervened on  · VT 10/28/21 LHC:  found to have apical LAD complete occlusion was felt to be small to be intervened    · Cardiac carrest 1/1/22 - [facility] felt to be hypoxic vs  VT induced  · On metoprolol 50mg BID, Brilinta 90 mg BID and Lipitor 40 mg qd

## 2022-08-17 NOTE — WOUND OSTOMY CARE
Progress Note - Wound   Kelli Red 65 y o  female MRN: [id number]  Unit/Bed#: [facility] 429-01 Encounter: [id number]        Assessment:  Wound care follow up visit for patient with sacral wound  Patient admitted with R empyema of the lung  History of - tracheostomy, DM, CHF, CKD, anemia, CAD, A-fib, HTN, and anxiety  Patient seen in chair, alert and oriented x 4, cooperative and agreeable for the assessment  Minimal assist of one with standing for the assessment  Incontinent of urine and stool      B/l heels are dry and intact without redness or wounds       1  POA unstageable- round/oval shaped, full thickness wound, 100% yellow slough tissue  Edges fragile and attached without maceration, improved in appearance and measurement this week  Calazime cream applied      Educated patient on the importance of frequent offloading of pressure via turning, repositioning and weight-shifting  Verbalized understanding of plan of care      No induration, fluctuance, odor, warmth/temperature differences, redness, or purulence noted to the above noted wounds and skin areas assessed  Patient tolerated assessment well- denies pain and no s/s of non-verbal pain or discomfort observed during the encounter  Primary nurse aware of plan of care  See flow sheets for more detailed assessment findings  Will follow along      Plan:   1 Cleanse sacral with soap and water, pat dry, and apply calazime cream TID and PRN   2  Apply skin nourishing cream the entire skin daily for moisture  3  Turn and reposition patient every  2 hours   4  Elevate heels off of bed with pillows to offload pressure   5  Apply EHOB waffle cushion to chair when OOB, if able  6  Apply Allevyn foam to heels, rené w/P, peel foam check skin integrity q-shift  Change q3d and PRN for soilage/dislodgement  7   Apply hydraguard to the b/l buttocks BID and PRN        Wound 08/02/22 Pressure Injury Sacrum (Active)   Wound Image   08/17/22 0946   Wound Description [facility] 08/17/22 9139   Pressure Injury Stage U 08/17/22 0946   Anna-wound Assessment Clean;Dry; Intact 08/17/22 0946   Wound Length (cm) 0 8 cm 08/17/22 0946   Wound Width (cm) 1 cm 08/17/22 0946   Wound Depth (cm) 0 2 cm 08/17/22 0946   Wound Surface Area (cm^2) 0 8 cm^2 08/17/22 0946   Wound Volume (cm^3) 0 16 cm^3 08/17/22 0946   Calculated Wound Volume (cm^3) 0 16 cm^3 08/17/22 0946   Change in Wound Size % -6 67 08/17/22 0946   Tunneling 0 cm 08/17/22 0946   Tunneling in depth located at 0 08/17/22 0946   Undermining 0 08/17/22 0946   Undermining is depth extending from 0 08/17/22 0946   Wound Site Closure BEE 08/17/22 0946   Drainage Amount Scant 08/17/22 0946   Drainage Description Serosanguineous 08/17/22 0946   Non-staged Wound Description Full thickness 08/17/22 0946   Treatments Cleansed 08/17/22 0946   Dressing Moisture barrier 08/17/22 0946   Wound packed? No 08/17/22 0946   Packing- # removed 0 08/17/22 0946   Packing- # inserted 0 08/17/22 0946   Dressing Changed New 08/17/22 0946   Patient Tolerance Tolerated well 08/17/22 0946   Dressing Status Clean;Dry; Intact 08/17/22 0946     Margo Joseph BSN, RN, [facility]

## 2022-08-17 NOTE — OCCUPATIONAL THERAPY NOTE
Occupational Therapy Progress Note     Patient Name: Selam Strong  HRLIV'C Date: 8/17/2022  Problem List  Principal Problem:    Empyema lung, Right  Active Problems:    Anxiety    Diabetic peripheral neuropathy (HCC)    Hyperlipidemia associated with type 2 diabetes mellitus ([facility] )    Hypertension    Type 2 diabetes mellitus with hyperglycemia (HCC)    A-fib ([facility])    CAD (coronary artery disease)    Tracheostomy in place (HCC)    Anemia    CKD (chronic kidney disease) stage 3, GFR 30-59 ml/min ([facility])    Acute Respiratory insufficiency on chronic hypoxic respiratory failure    Pain at Chest tube insertion site  Chest wall wound infection    Acute on chronic heart failure with preserved ejection fraction ([facility] )          08/17/22 1050   OT Last Visit   OT Visit Date 08/17/22   Note Type   Note Type Treatment   Restrictions/Precautions   Other Precautions Multiple lines;Telemetry   General   Response to Previous Treatment Patient with no complaints from previous session   Lifestyle   Autonomy Pt reports being I with ADLS, requires A with IADLS and uses RW for mobility PTA  Reciprocal Relationships Pt lives with supportive family who are able to assist   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, was a HHA   Intrinsic Gratification Spending time with her son   Pain Assessment   Pain Assessment Tool 0-10   Pain Score No Pain   ADL   Where Assessed Chair   Grooming Assistance 5  Supervision/Setup   Grooming Deficit Setup;Supervision/safety   UB Dressing Assistance 4  Minimal Assistance   UB Dressing Deficit Setup;Supervision/safety; Increased time to complete; Thread RUE; Thread LUE   LB Dressing Assistance 4  Minimal Assistance   LB Dressing Deficit Setup;Supervision/safety   Bed Mobility   Supine to Sit 5  Supervision   Additional items Increased time required;Verbal cues   Additional Comments After OT session pt in chair with all needs within reach     Transfers   Sit to Stand 4  Minimal assistance   Additional items Assist x 1; Increased time required;Verbal cues   Stand to Sit 4  Minimal assistance   Additional items Assist x 1; Increased time required;Verbal cues   Functional Mobility   Functional Mobility 4  Minimal assistance   Additional Comments Pt demonstrated short household mobility with RW  Additional items Rolling walker   Cognition   Overall Cognitive Status WFL   Arousal/Participation Alert; Cooperative   Attention Within functional limits   Orientation Level Oriented X4   Memory Decreased recall of precautions   Following Commands Follows one step commands without difficulty   Comments Pt is pleasant and cooperative  Activity Tolerance   Activity Tolerance Patient limited by fatigue   Medical Staff Made Aware RN confirmed okay to see pt   Assessment   Assessment Patient participated in Skilled OT session this date with interventions consisting of ADL re training with the use of correct body mechanics and  therapeutic activities to: increase activity tolerance   Patient agreeable to OT treatment session, upon arrival patient was found supine in bed  In comparison to previous session, patient with improvements in functional transfers  Patient requiring frequent rest periods  Patient continues to be functioning below baseline level, occupational performance remains limited secondary to factors listed above and increased risk for falls and injury  From OT standpoint, recommendation at time of d/c would be Short Term Rehab  Patient to benefit from continued Occupational Therapy treatment while in the hospital to address deficits as defined above and maximize level of functional independence with ADLs and functional mobility  Plan   Treatment Interventions ADL retraining;Functional transfer training; Compensatory technique education   Goal Expiration Date 08/22/22   OT Treatment Day 4   OT Frequency 3-5x/wk   Recommendation   OT Discharge Recommendation Post acute rehabilitation services   AM-PAC Daily Activity Inpatient   Lower Body Dressing 3   Bathing 2   Toileting 3   Upper Body Dressing 3   Grooming 4   Eating 4   Daily Activity Raw Score 19   Daily Activity Standardized Score (Calc for Raw Score >=11) 40 22   AM-PAC Applied Cognition Inpatient   Following a Speech/Presentation 3   Understanding Ordinary Conversation 4   Taking Medications 4   Remembering Where Things Are Placed or Put Away 4   Remembering List of 4-5 Errands 4   Taking Care of Complicated Tasks 3   Applied Cognition Raw Score 22   Applied Cognition Standardized Score 47 83   Modified Vikas Scale   Modified Vikas Scale 4       Riana Lopez, VJ, OTR/L

## 2022-08-17 NOTE — PROGRESS NOTES
Progress Note - Infectious Disease   Kelli Red 65 y o  female MRN: [id number]  Unit/Bed#: [facility] 429-01 Encounter: [id number]      Impression/Recommendations:  1  Probable right-sided empyema   CT show loculated right pleural effusion   Patient is status post placement of chest tube on 07/29, with pleural fluid parameters consistent with empyema   Culture had no growth, likely due to prior antibiotic   Given MRSA in right chest wound, MRSA is likely pathogen in empyema   Despite chest tube drainage, repeat chest CT on 08/02 showed persistent and unchanged loculated empyema   Patient is now status post VATS/decortication   Operative culture also had no growth, likely due to prolonged antibiotic prior to surgery   Chest tube was removed   Patient is clinically much improved  Continue IV vancomycin  Monitor temperature/WBC  Follow-up final operative culture  Treat x 2 weeks after VATS, through 8/20      2  Sepsis, with leukocytosis and tachypnea, likely secondary to empyema above   Patient is clinically and systemically improved   WBC has normalized   Tachypnea resolved  [place] Antibiotic plan as in below  Monitor temperature/WBC  Monitor hemodynamics      3  MRSA right chest wall infection, as site recent/prior chest tube placement for spontaneous pneumothorax  Antibiotic plan as in above  Serial exams      4  Acute on chronic hypoxic respiratory failure, likely multifactorial   Respiratory culture with MRSA and Pseudomonas but no obvious pneumonia   These isolates are most likely airway colonization  Management per primary service      5  Recent spontaneous pneumothorax, status post chest tube placement on 07/20   This was removed on 07/24      6  DM, type 2, poorly controlled, with hyperglycemia and elevated hemoglobin A1c   This is risk factor for infection above  Management per primary service      7  MODESTO   Creatinine up and down, trending up over the last few days    Antibiotic dosages adjusted accordingly  Monitor creatinine      Discussed with patient in detail regarding the above plan  Plan for discharge to rehabilitation noted  Okay for discharge any time, from ID viewpoint      Antibiotics:  Vancomycin # 22  POD # 11     Subjective:  Patient's right-sided chest pain mild, improving and controlled  Temperature stays down   No chills  She is tolerating antibiotic well   No nausea, vomiting or diarrhea      Objective:  Vitals:  Temp:  [98 3 °F (36 8 °C)-98 7 °F (37 1 °C)] 98 3 °F (36 8 °C)  HR:  [61-98] 98  Resp:  [16-20] 20  BP: (124-135)/(69-82) 124/69  SpO2:  [93 %-100 %] 93 %  Temp (24hrs), Av 5 °F (36 9 °C), Min:98 3 °F (36 8 °C), Max:98 7 °F (37 1 °C)  Current: Temperature: 98 3 °F (36 8 °C)    Physical Exam:     General: Awake, alert, cooperative, no distress  Neck:  Supple  No mass  No lymphadenopathy  Lungs: Decreased breath sounds on right, sparse right basilar rhonchi and rales, no wheezing, respirations unlabored  Heart:  Regular rate and rhythm, S1 and S2 normal, no murmur  Abdomen: Soft, nondistended, non-tender, bowel sounds active all four quadrants, no masses, no organomegaly  Extremities: No edema  No erythema/warmth  No ulcer  Nontender to palpation  Skin:  No rash  Neuro: Moves all extremities  Invasive Devices  Report    Peripherally Inserted Central Catheter Line  Duration           PICC Line 22 Right Brachial 6 days          Drain  Duration           External Urinary Catheter 3 days          Airway  Duration           Surgical Airway Shiley Fenestrated 168 days                Labs studies:   I have personally reviewed pertinent labs    Results from last 7 days   Lab Units 22  0604 22  0452 08/15/22  0532   POTASSIUM mmol/L 5 4* 5 4* 5 1   CHLORIDE mmol/L 107 105 103   CO2 mmol/L 29 30 31   BUN mg/dL 43* 42* 42*   CREATININE mg/dL 1 89* 1 68* 1 72*   EGFR ml/min/1 73sq m 29 33 32   CALCIUM mg/dL 8 5 8 7 8 9     Results from last 7 days Lab Units 08/16/22  0452 08/11/22  0523   WBC Thousand/uL 9 05 7 94   HEMOGLOBIN g/dL 8 3* 10 2*   PLATELETS Thousands/uL 587* 484*           Imaging Studies:   I have personally reviewed pertinent imaging study reports and images in PACS  EKG, Pathology, and Other Studies:   I have personally reviewed pertinent reports

## 2022-08-17 NOTE — ASSESSMENT & PLAN NOTE
· Trach placed in the context of cardiac arrest/prolonged ventilator dependent state November 2021     · Decannulated at [facility] 12/11/21  · Patient requires frequent tracheostomy changes and suctioning  · On trach collar O2 with humidification and tolerating well btw 6-8L

## 2022-08-17 NOTE — PROGRESS NOTES
Vancomycin IV Pharmacy-to-Dose Consultation     Selam Strong is a 65 y o  female who is currently receiving Vancomycin IV with management by the Pharmacy Consult service for the treatment of empyema  Assessment/Plan:     The patient's chart was reviewed  Renal function has worsened: Scr 1 23 (08/10), 1 68 (08/15), 1 89 (08/17)  Patient's most recent random vancomycin level of 20 3 (08/17) is above goal (15-20)  There are no signs or symptoms of infusion reactions documented  While patient's random level is above of 20 this morning @0604, will order a one time dose of vancomycin 750 mg IV at 1100  Random level will be drawn tomorrow (08/18) @1030  Vancomycin dose day 16 of therapy  Per ID treatment through 8/20  Pharmacy will continue to follow closely for s/sx of nephrotoxicity, infusion reactions and appropriateness of therapy  BMP and CBC will be ordered per protocol  We will continue to follow the patients culture results and clinical progress daily       Dara Sarkar, Pharmacist

## 2022-08-17 NOTE — PLAN OF CARE
Problem: MOBILITY - ADULT  Goal: Maintain or return to baseline ADL function  Description: INTERVENTIONS:  -  Assess patient's ability to carry out ADLs; assess patient's baseline for ADL function and identify physical deficits which impact ability to perform ADLs (bathing, care of mouth/teeth, toileting, grooming, dressing, etc )  - Assess/evaluate cause of self-care deficits   - Assess range of motion  - Assess patient's mobility; develop plan if impaired  - Assess patient's need for assistive devices and provide as appropriate  - Encourage maximum independence but intervene and supervise when necessary  - Involve family in performance of ADLs  - Assess for home care needs following discharge   - Consider OT consult to assist with ADL evaluation and planning for discharge  - Provide patient education as appropriate  Outcome: Progressing  Goal: Maintains/Returns to pre admission functional level  Description: INTERVENTIONS:  - Perform BMAT or MOVE assessment daily    - Set and communicate daily mobility goal to care team and patient/family/caregiver  - Collaborate with rehabilitation services on mobility goals if consulted  - Perform Range of Motion 3 times a day  - Reposition patient every 3 hours    - Dangle patient 3 times a day  - Stand patient 3 times a day  - Ambulate patient 3 times a day  - Out of bed to chair 3 times a day   - Out of bed for meals 3 times a day  - Out of bed for toileting  - Record patient progress and toleration of activity level   Outcome: Progressing     Problem: RESPIRATORY - ADULT  Goal: Achieves optimal ventilation and oxygenation  Description: INTERVENTIONS:  - Assess for changes in respiratory status  - Assess for changes in mentation and behavior  - Position to facilitate oxygenation and minimize respiratory effort  - Oxygen administered by appropriate delivery if ordered  - Initiate smoking cessation education as indicated  - Encourage broncho-pulmonary hygiene including cough, deep breathe, Incentive Spirometry  - Assess the need for suctioning and aspirate as needed  - Assess and instruct to report SOB or any respiratory difficulty  - Respiratory Therapy support as indicated  Outcome: Progressing     Problem: CARDIOVASCULAR - ADULT  Goal: Maintains optimal cardiac output and hemodynamic stability  Description: INTERVENTIONS:  - Monitor I/O, vital signs and rhythm  - Monitor for S/S and trends of decreased cardiac output  - Administer and titrate ordered vasoactive medications to optimize hemodynamic stability  - Assess quality of pulses, skin color and temperature  - Assess for signs of decreased coronary artery perfusion  - Instruct patient to report change in severity of symptoms  Outcome: Progressing  Goal: Absence of cardiac dysrhythmias or at baseline rhythm  Description: INTERVENTIONS:  - Continuous cardiac monitoring, vital signs, obtain 12 lead EKG if ordered  - Administer antiarrhythmic and heart rate control medications as ordered  - Monitor electrolytes and administer replacement therapy as ordered  Outcome: Progressing     Problem: Prexisting or High Potential for Compromised Skin Integrity  Goal: Skin integrity is maintained or improved  Description: INTERVENTIONS:  - Identify patients at risk for skin breakdown  - Assess and monitor skin integrity  - Assess and monitor nutrition and hydration status  - Monitor labs   - Assess for incontinence   - Turn and reposition patient  - Assist with mobility/ambulation  - Relieve pressure over bony prominences  - Avoid friction and shearing  - Provide appropriate hygiene as needed including keeping skin clean and dry  - Evaluate need for skin moisturizer/barrier cream  - Collaborate with interdisciplinary team   - Patient/family teaching  - Consider wound care consult   Outcome: Progressing     Problem: Potential for Falls  Goal: Patient will remain free of falls  Description: INTERVENTIONS:  - Educate patient/family on patient safety including physical limitations  - Instruct patient to call for assistance with activity   - Consult OT/PT to assist with strengthening/mobility   - Keep Call bell within reach  - Keep bed low and locked with side rails adjusted as appropriate  - Keep care items and personal belongings within reach  - Initiate and maintain comfort rounds  - Make Fall Risk Sign visible to staff  - Offer Toileting every 2 Hours, in advance of need  - Initiate/Maintain alarm  - Obtain necessary fall risk management equipment  - Apply yellow socks and bracelet for high fall risk patients  - Consider moving patient to room near nurses station  Outcome: Progressing     Problem: Knowledge Deficit  Goal: Patient/family/caregiver demonstrates understanding of disease process, treatment plan, medications, and discharge instructions  Description: Complete learning assessment and assess knowledge base    Interventions:  - Provide teaching at level of understanding  - Provide teaching via preferred learning methods  Outcome: Progressing

## 2022-08-17 NOTE — PLAN OF CARE
Problem: OCCUPATIONAL THERAPY ADULT  Goal: Performs self-care activities at highest level of function for planned discharge setting  See evaluation for individualized goals  Description: Treatment Interventions: ADL retraining, Functional transfer training, UE strengthening/ROM, Endurance training, Patient/family training, Compensatory technique education, Continued evaluation, Energy conservation, Activityengagement          See flowsheet documentation for full assessment, interventions and recommendations  Outcome: Progressing  Note: Limitation: Decreased ADL status, Decreased endurance, Decreased self-care trans, Decreased high-level ADLs  Prognosis: Good  Assessment: Patient participated in Skilled OT session this date with interventions consisting of ADL re training with the use of correct body mechanics and  therapeutic activities to: increase activity tolerance   Patient agreeable to OT treatment session, upon arrival patient was found supine in bed  In comparison to previous session, patient with improvements in functional transfers  Patient requiring frequent rest periods  Patient continues to be functioning below baseline level, occupational performance remains limited secondary to factors listed above and increased risk for falls and injury  From OT standpoint, recommendation at time of d/c would be Short Term Rehab  Patient to benefit from continued Occupational Therapy treatment while in the hospital to address deficits as defined above and maximize level of functional independence with ADLs and functional mobility       OT Discharge Recommendation: Post acute rehabilitation services

## 2022-08-17 NOTE — ASSESSMENT & PLAN NOTE
Results from last 7 days   Lab Units 08/17/22  0604 08/16/22  0452 08/15/22  0532 08/14/22  0448 08/13/22  0557 08/12/22  0442 08/11/22  0523   BUN mg/dL 43* 42* 42* 40* 37* 32* 34*   CREATININE mg/dL 1 89* 1 68* 1 72* 1 73* 1 71* 1 48* 1 44*   EGFR ml/min/1 73sq m 29 33 32 32 33 39 40     · MODESTO noted on [facility] with elevated Cr 1 64, initially resolved  (Baseline Cr being 0 9-1 0)  · Cr now bumped again during admission, bumex d/c'd 8/13 - monitor off  · Continue to monitor closely   Losartan on hold  · Avoid nephrotoxic agents  · Follow up Cr  given Bumex hold > if still elevated, consider decreasing dose of aldactone to 50mg

## 2022-08-17 NOTE — CASE MANAGEMENT
Case Management Discharge Planning Note    Patient name Linda Fam  Location [facility] 269-31 MRN [id number]  : 1965 Date 2022       Current Admission Date: 2022  Current Admission Diagnosis:Empyema lung, Right   Patient Active Problem List    Diagnosis Date Noted    Empyema lung, Right 2022    Chest wall wound infection 2022    Acute on chronic heart failure with preserved ejection fraction ([address] ) 2022    Pain at Chest tube insertion site    2022    Acute Respiratory insufficiency on chronic hypoxic respiratory failure 2022    Leukocytosis 2022    Hyponatremia 2022    CKD (chronic kidney disease) stage 3, GFR 30-59 ml/min ([facility]) 2022    Hemoptysis 2022    Hypomagnesemia 2022    Chest pain 2022    Moderate protein-calorie malnutrition ([address] ) 2022    Thoracic aortic aneurysm, ruptured ([facility] ) 2022    Shortness of breath 2022    Prolonged Q-T interval on ECG 2022    Elevated troponin 2022    Anemia 2022    Insomnia secondary to anxiety 2022    Chronic hypoxemic respiratory failure ([facility] ) 03/15/2022    Tracheostomy in place [facility]) 2022    Subglottic stenosis 2022    CAD (coronary artery disease) 2021    Urinary retention 2021    Cerebral aneurysm 2021    Cerebral vascular accident ([address] ) 2021    Acute on chronic respiratory failure with hypoxia ([address] ) 2021    History of Cardiac arrest ([address] ) 2021    A-fib ([address] ) 10/30/2021    History of Ventricular tachycardia ([address] ) 10/27/2021    MODESTO (acute kidney injury) ([facility] ) 10/24/2021    Cellulitis of left lower extremity 2021    Hypoglycemia 2021    Polypharmacy 2021    Trigger finger, right middle finger 2021    Trigger finger, right ring finger 2021    Diarrhea 2021    Nasal vestibulitis 2021    Orthopnea 2021    CHANTALE (obstructive sleep apnea) 01/14/2021    Palpitations 11/17/2020    Abscess 10/11/2020    Bacterial vaginosis 07/09/2020    Urinary tract infection with hematuria 02/03/2020    Epigastric pain 07/02/2019    Thoracic aortic aneurysm without rupture (HCC) 08/12/2018    Hypokalemia 08/11/2018    Abnormal urinalysis 08/11/2018    Vaginal discharge 04/12/2018    Yeast vaginitis 04/12/2018    Recurrent vaginitis 04/12/2018    Cardiac murmur 12/15/2017    Primary insomnia 12/15/2017    Anxiety 07/26/2017    Depression, recurrent ([facility] ) 07/26/2017    Retinal hemorrhage due to secondary diabetes ([facility] ) 07/26/2017    Fibromyalgia 07/26/2017    Hypertension 07/26/2017    Hypoestrogenism 07/26/2017    Neuropathy 07/26/2017    Chronic pain disorder 06/05/2017    Medically complex patient 06/05/2017    Change in bowel habit 04/24/2017    Screening for colon cancer 12/05/2016    Cough 02/15/2016    Non compliance with medical treatment 01/27/2016    Dizziness 01/26/2016    Gastroesophageal reflux disease with esophagitis 01/26/2016    Vertigo 01/26/2016    Vertebral body hemangioma 08/21/2015    Hemangioma of bone 07/30/2015    Right-sided thoracic back pain 07/23/2015    Thoracic radiculitis 07/23/2015    Carpal tunnel syndrome of left wrist 06/26/2015    Tobacco abuse 06/26/2015    Type 2 diabetes mellitus with hyperglycemia ([facility] ) 06/09/2015    Hyperlipidemia associated with type 2 diabetes mellitus ([facility] ) 05/06/2015    Noncompliance with diet and medication regimen 05/06/2015    Shingles 03/25/2015    Diabetic peripheral neuropathy ([facility] ) 02/25/2015    Low back pain 02/25/2015    Lumbar radiculopathy 02/25/2015    Overweight 02/25/2015    Sciatica of left side 02/25/2015      LOS (days): 15  Geometric Mean LOS (GMLOS) (days): 9 60  Days to GMLOS:-5 2     OBJECTIVE:  Risk of Unplanned Readmission Score: 68 4         Current admission status: Inpatient   Preferred Pharmacy:   [id number] [address] - [id number] [facility]  [address]  [address]  Phone: [phone] Fax: [phone], [address] [place] [address] [address]  [address]  [place] [address]  Phone: [phone] Fax: [phone]    [facility] #[id number] - [address], [facility] [address], [place] [address] [facility] [address]  Phone: [phone] Fax: [phone] [facility], [facility] [place] [address] [facility] [address] [facility] [address] [facility] [address] [facility]  Phone: [phone] Fax: [phone]    Primary Care Provider: Yolanda Ly MD    Primary Insurance: Foster Dec REP  Secondary Insurance: [address] Number: approved [facility] Angela Cedeno #[id number] for [facility]: 8/17  NRD: 8/23  Care Coord: Enma Abdul  P: [phone]   F: [phone]

## 2022-08-17 NOTE — ASSESSMENT & PLAN NOTE
Results from last 7 days   Lab Units 08/16/22  0452 08/11/22  0523   HEMOGLOBIN g/dL 8 3* 10 2*   HEMATOCRIT % 27 5* 34 9     Most likely anemia of chronic disease due to prolonged illness  No active bleeding noted - Hb is stable  Hg (8/4): 6 8 s/p  5 unit PRBC's since this admission  Hb stable, monitor closely  Consider transfusing if < 8 due to history of CAD  Continue Iron replacement as well as daily miralax

## 2022-08-17 NOTE — PHYSICAL THERAPY NOTE
Physical Therapy Treatment Note    Patient's Name: Amanda Lee  : 22 1454   PT Last Visit   PT Visit Date 22   Note Type   Note Type Treatment   Pain Assessment   Pain Assessment Tool 0-10   Pain Score 5   Pain Location/Orientation Orientation: Right;Location: Chest;Location: Incision   Restrictions/Precautions   Weight Bearing Precautions Per Order No   Other Precautions Contact/isolation;Multiple lines; Fall Risk;Pain;Telemetry  (trach collar - Venturi for ambulation)   General   Chart Reviewed Yes   Response to Previous Treatment Patient with no complaints from previous session  Family/Caregiver Present No   Subjective   Subjective Pt agreeable to mobilize, reports feeling fatigued  Bed Mobility   Supine to Sit Unable to assess   Sit to Supine Unable to assess   Additional Comments Pt greeted in chair  Transfers   Sit to Stand 5  Supervision   Additional items Increased time required;Verbal cues;Armrests   Stand to Sit 4  Minimal assistance   Additional items Assist x 1; Increased time required;Verbal cues;Armrests  (cues for eccentric control )   Additional Comments RW   Ambulation/Elevation   Gait pattern Excessively slow; Short stride; Foward flexed;Decreased foot clearance; Improper Weight shift   Gait Assistance 4  Minimal assist   Additional items Assist x 1;Verbal cues; Tactile cues  (+ 2nd person for chair follow)   Assistive Device Rolling walker   Distance 15' + 20'   Stair Management Assistance Not tested   Balance   Static Sitting Fair +   Dynamic Sitting Fair   Static Standing Fair -   Dynamic Standing Poor +   Ambulatory Poor +  (RW)   Endurance Deficit   Endurance Deficit Yes   Endurance Deficit Description LE weakness/fatigue, CONNOLLY   Activity Tolerance   Activity Tolerance Patient limited by fatigue   Nurse Made Aware yes   Exercises   Hip Abduction Sitting;20 reps;AROM; Bilateral   Hip Adduction Sitting;20 reps;AROM; Bilateral   Knee AROM [employer] reps;AROM; Bilateral   Ankle Pumps Sitting;20 reps;AROM; Bilateral   Marching Sitting;20 reps;AROM; Bilateral   Assessment   Prognosis Good   Problem List Decreased strength;Decreased endurance; Impaired balance;Decreased mobility; Decreased safety awareness;Decreased skin integrity;Pain   Assessment Pt seen for PT session w/ focus on t/f training, gait training, + ther ex instruction  Second person present during mobility portion of session for safety w/ chair follow  Pt able to tolerate 20 more feet after seated rest x3 min  Pt able to tolerate seated HEP - encouraged to perform 2 more times later today  From a PT standpoint continue to recommend rehab upon d/c  Goals , reviewed + extended as they still remain appropriate  Barriers to Discharge Inaccessible home environment   Goals   Patient Goals get stronger   STG Expiration Date 22   PT Treatment Day 7   Plan   Treatment/Interventions Functional transfer training;LE strengthening/ROM; Therapeutic exercise; Endurance training;Patient/family training;Equipment eval/education; Bed mobility;Gait training; Compensatory technique education;Spoke to nursing  (balance training)   Progress Slow progress, decreased activity tolerance   PT Frequency   (3-6x/wk)   Recommendation   PT Discharge Recommendation Post acute rehabilitation services   Equipment Recommended [facility] Recommended Wheeled walker   Change/add to Unruly Â®?  No   AM-PAC Basic Mobility Inpatient   Turning in Bed Without Bedrails 3   Lying on Back to Sitting on Edge of Flat Bed 3   Moving Bed to Chair 3   Standing Up From Chair 3   Walk in Room 3   Climb 3-5 Stairs 1   Basic Mobility Inpatient Raw Score 16   Basic Mobility Standardized Score 38 32   Highest Level Of Mobility   JH-HLM Goal 5: Stand one or more mins   JH-HLM Achieved 7: Walk 25 feet or more   Education   Education Provided Mobility training;Home exercise program;Assistive device   Patient Demonstrates acceptance/verbal understanding;Reinforcement needed   End of Consult   Patient Position at End of Consult Bedside chair; All needs within reach  (on waffle cushion, all lines in tact)     Zohaib Garces, PT, DPT

## 2022-08-17 NOTE — CASE MANAGEMENT
Case Management Progress Note    Patient name Melinda Arana  Location [facility] 429/[facility] 429-01 MRN [id number]  : 1965 Date 2022       LOS (days): 15  Geometric Mean LOS (GMLOS) (days): 9 60  Days to GMLOS:-5 2        Pt's LOS is 15 days  Pt remains medically ready for d/c  Pt has been referred to and accepted for short-term skilled rehab placement at [facility] located in [facility]  Pt is awaiting final approval from her [facility] for her admission to [facility]  Pt's son Vadim Priest was able to locate pt's COVID-19 Vaccination Card, and brought it to the hospital where a copy of it was made  This copy was shared w/ [address] [place] [address] admissions staff  Once final approval for SNF rehab placement is given by pt's [facility], then pt will d/c to [address] [place] [address] via BLS ambulance ride  CM to follow

## 2022-08-17 NOTE — UTILIZATION REVIEW
Continued Stay Review    Date: 08/17/2022                         Current Patient Class: Inpatient  Current Level of Care: Med/Surg    HPI:57 y o  female initially admitted on 08/02/2022  · 8/6 - S/P VATS Decortation  procedure  Assessment/Plan:  Empyema right lung  Continue IV Abx - Vanco through 8/20  Aggressive Pulmonary Toilet  Monitor fever curve and WBC closely  Continue low salt diet  Strict I&O  Needs Life vest on DC  Encourage OOB & IS  Track mask O2 @ 5L        Vital Signs: /67 (BP Location: Left arm)   Pulse 64   Temp 98 2 °F (36 8 °C) (Oral)   Resp 21   Wt 80 kg (176 lb 5 9 oz)   SpO2 95%   BMI 26 05 kg/m²     Pertinent Labs/Diagnostic Results:     Results from last 7 days   Lab Units 08/16/22  0452 08/11/22  0523   WBC Thousand/uL 9 05 7 94   HEMOGLOBIN g/dL 8 3* 10 2*   HEMATOCRIT % 27 5* 34 9   PLATELETS Thousands/uL 587* 484*   NEUTROS ABS Thousands/µL  --  5 86     Results from last 7 days   Lab Units 08/17/22  0604 08/16/22  0452 08/15/22  0532 08/14/22  0448 08/13/22  0557   SODIUM mmol/L 139 138 139 135 136   POTASSIUM mmol/L 5 4* 5 4* 5 1 4 6 4 1   CHLORIDE mmol/L 107 105 103 99 99   CO2 mmol/L 29 30 31 34* 31   ANION GAP mmol/L 3* 3* 5 2* 6   BUN mg/dL 43* 42* 42* 40* 37*   CREATININE mg/dL 1 89* 1 68* 1 72* 1 73* 1 71*   EGFR ml/min/1 73sq m 29 33 32 32 33   CALCIUM mg/dL 8 5 8 7 8 9 8 4 8 9     Results from last 7 days   Lab Units 08/17/22  1039 08/17/22  0612 08/16/22  2050 08/16/22  1601 08/16/22  1042 08/16/22  0538 08/15/22  2153 08/15/22  1608 08/15/22  1032 08/15/22  0712 08/14/22  2042 08/14/22  1625   POC GLUCOSE mg/dl 269* 147* 258* 260* 339* 187* 163* 344* 316* 221* 247* 248*     Results from last 7 days   Lab Units 08/17/22  0604 08/16/22  0452 08/15/22  0532 08/14/22  0448 08/13/22  0557 08/12/22  0442 08/11/22  0523   GLUCOSE RANDOM mg/dL 158* 189* 193* 178* 188* 121 153*     Medications:   Scheduled Medications:  acetaminophen, 975 mg, Oral, Q8H [address]  amiodarone, 100 mg, Oral, Daily With Breakfast  apixaban, 5 mg, Oral, BID  atorvastatin, 40 mg, Oral, Daily With [employer] vac (bautista sucrose, gray cap form), 0 3 mL, Intramuscular, Once  escitalopram, 10 mg, Oral, Daily  ferrous sulfate, 325 mg, Oral, Daily With Breakfast  insulin glargine, 25 Units, Subcutaneous, HS  insulin lispro, 5-25 Units, Subcutaneous, TID AC  lidocaine, 2 patch, Topical, Daily  metoprolol succinate, 50 mg, Oral, Q12H  mirtazapine, 7 5 mg, Oral, HS  pantoprazole, 40 mg, Oral, Early Morning  polyethylene glycol, 17 g, Oral, Daily  pregabalin, 25 mg, Oral, QPM  pregabalin, 75 mg, Oral, Daily  senna, 1 tablet, Oral, Daily  ticagrelor, 90 mg, Oral, Q12H [address]  white petrolatum-mineral oil, , Topical, BID      Continuous IV Infusions:     PRN Meds:  albuterol, 2 5 mg, Nebulization, Q4H PRN  benzonatate, 100 mg, Oral, TID PRN  diazepam, 5 mg, Oral, Q6H PRN  HYDROmorphone, 0 5 mg, Intravenous, Q2H PRN  HYDROmorphone, 2 mg, Oral, Q4H PRN  HYDROmorphone, 4 mg, Oral, Q4H PRN  hydrOXYzine HCL, 25 mg, Oral, Q6H PRN  ondansetron, 4 mg, Intravenous, Q6H PRN  polyethylene glycol, 17 g, Oral, Daily PRN  trimethobenzamide, 200 mg, Intramuscular, Q6H PRN  vancomycin, 750 mg, Intravenous, Daily PRN        Discharge Plan: D    Network Utilization Review Department  ATTENTION: Please call with any questions or concerns to [phone] and carefully listen to the prompts so that you are directed to the right person  All voicemails are confidential   María Kiran all requests for admission clinical reviews, approved or denied determinations and any other requests to dedicated fax number below belonging to the campus where the patient is receiving treatment   List of dedicated fax numbers for the Facilities:  [address] DENIALS (Administrative/Medical Necessity) [phone]   [address] (Maternity/NICU/Pediatrics) 261 [facility] Jered 40 125 [facility]  [phone]   [facility] 50 [address] [facility] [address] [facility] 9519 [address] [place] [address] Samara Velázquez 1481 [address] [phone]

## 2022-08-17 NOTE — ASSESSMENT & PLAN NOTE
Lab Results   Component Value Date    HGBA1C 12 7 (H) 05/22/2022       Recent Labs     08/16/22  1042 08/16/22  1601 08/16/22 2050 08/17/22  0612   POCGLU 339* 260* 258* 147*     Uncontrolled per most recent A1c  Home regimen: Lantus 16U QHS and Novolog 4U TID  Previous inpatient regimen: Lantus 45U qHS + Humalog 22U TID + SSI 4  Current regimen: Lantus 20 units QHS and SSI 6  Will monitor closely and possibly increase Lantus depending on coverage  Will continue for now and adjust as needed to maintain  - 180

## 2022-08-17 NOTE — PROGRESS NOTES
[facility]  Progress Note - Ariel Ramos 1965, 65 y o  female MRN: [id number]  Unit/Bed#: [facility] 429-01 Encounter: [id number]  Primary Care Provider: Nadira White MD   Date and time admitted to hospital: 8/2/2022  8:31 PM    * Empyema lung, Right  Assessment & Plan  · Recurrent loculated right pleural effusion  S/P right-sided chest tube insertion and removal on 07/24  · CT of the chest on 7/28/22 revealed moderate partially loculated right pleural effusion  · Right chest tube re-placed on 7/29/22 by IR  · Pleural fluid analysis shows neutrophil predominant WBC count  · Fluid cultures from prior anterior chest tube site positive for MRSA  · 8/1 - CXR: Small component of right pleural effusion suspected which may be partially loculated  Indwelling right thoracostomy tube without pneumothorax  · 8/2 CT Chest:  Decreased size of a right-sided pleural effusion and improved aeration of the right lower lobe post placement of a right pleural drainage catheter, with unchanged appearance of a loculated component of the effusion superomedially  There is slightly  increased density of the effusion compatible with a small amount of blood products  2   Appearance of new inflammatory groundglass opacities in the left lower lobe  3   Mild background interstitial edema is unchanged  · 8/6 - S/P VATS Decortation  procedure  · 8/9 - Apical chest tube removed by thoracic surgery   · 8/11 - patient medically cleared for discharge to [facility]  Awaiting insurance authorization     · 8/11- PICC line placed   Plan:  - Continue Abx w/ Vanc given MRSA, Vanc will need to be continued through 8/20 per ID  - Aggressive pulmonary toilet  - Monitor fever curve and white count closely - patient has remained afebrile with stable white count    Acute on chronic heart failure with preserved ejection fraction [facility])  Assessment & Plan  Wt Readings from Last 3 Encounters:   08/17/22 80 kg (176 lb 5 9 oz) 08/02/22 77 7 kg (171 lb 6 4 oz)   07/19/22 78 2 kg (172 lb 6 4 oz)     · Initially admitted to [facility] on 07/15 with CHF exacerbation, now optimized  · Most recent EF 50% with normal systolic and diastolic function on 3/81/8104  Currently stable and euvolemic  · On Bumex 2 mg daily with Aldactone 100 mg daily - restarted on 7/29, Bumex d/c'd 8/13 and Aldactone decreased to 50mg   · Continue low-salt diet, strict I's and O's monitoring  · Per Cardiology at [facility] upon discharge  · Will need follow-up with General Cardiology & EP service upon discharge for ICD consideration    Chest wall wound infection  Assessment & Plan  · Purulent discharge noted at site of recent chest tube placement  · Cultures positive for MRSA on 07/28 susceptible to vanc  · Currently on IV vancomycin - will continue thru 8/20 per ID  · Continue local wound care/dressing changes     Pain at Chest tube insertion site  Assessment & Plan  · Improving  · Prior complains of intractable pain at prior chest tube site on right anterior chest wall  This was removed 7/24/22  · Was followed by A[facility] back in [facility], S/P epidural catheter which was removed 8/2  · Current pain regimen: Diaz Tylenol 975 Q8h, PO Dilaudid 2/4 for mod/severe, IV Dilaudid 0 5 mg Q2  · Bowel regimen:  Scheduled senna and MiraLax daily  · Both chest tubes now removed - last chest tube removed on 8/10 by CTS     Acute Respiratory insufficiency on chronic hypoxic respiratory failure  Assessment & Plan  · She is status post trach   Currently on 6-8L with sats in the high 90s, at home uses 10 L  · Status post recent right pneumothorax requiring chest tube and now with right empyema  · Continue aggressive respiratory protocol, p r n  suctioning  · Continue Xoponex and Atrovent per Pulm q6hrs  · Encourage out of bed, incentive spirometry  · Pulmonology following      CKD (chronic kidney disease) stage 3, GFR 30-59 ml/min ([facility])  Assessment & Plan Results from last 7 days   Lab Units 08/17/22  0604 08/16/22  0452 08/15/22  0532 08/14/22  0448 08/13/22  0557 08/12/22  0442 08/11/22  0523   BUN mg/dL 43* 42* 42* 40* 37* 32* 34*   CREATININE mg/dL 1 89* 1 68* 1 72* 1 73* 1 71* 1 48* 1 44*   EGFR ml/min/1 73sq m 29 33 32 32 33 39 40     · MODESTO noted on [facility] with elevated Cr 1 64, initially resolved  (Baseline Cr being 0 9-1 0)  · Cr now bumped again during admission, bumex d/c'd 8/13 - monitor off  · Continue to monitor closely  Losartan on hold  · Avoid nephrotoxic agents  · Follow up Cr  given Bumex hold > if still elevated, consider decreasing dose of aldactone to 50mg    Anemia  Assessment & Plan    Results from last 7 days   Lab Units 08/16/22  0452 08/11/22  0523   HEMOGLOBIN g/dL 8 3* 10 2*   HEMATOCRIT % 27 5* 34 9     Most likely anemia of chronic disease due to prolonged illness  No active bleeding noted - Hb is stable  Hg (8/4): 6 8 s/p  5 unit PRBC's since this admission  Hb stable, monitor closely  Consider transfusing if < 8 due to history of CAD  Continue Iron replacement as well as daily miralax     Tracheostomy in place [facility])  Assessment & Plan  · Trach placed in the context of cardiac arrest/prolonged ventilator dependent state November 2021  · Decannulated at [facility] 12/11/21  · Patient requires frequent tracheostomy changes and suctioning  · On trach collar O2 with humidification and tolerating well btw 6-8L     CAD (coronary artery disease)  Assessment & Plan  · STEMI 10/22/2021 s/p PATRICA mid circumflex OM1  OM2 was ballooned but not stented    · Pulseless VT 10/27/21 - repeat LHC 90% mid circumflex stenosis, but could not be intervened on  · VT 10/28/21 LHC:  found to have apical LAD complete occlusion was felt to be small to be intervened    · Cardiac carrest 1/1/22 - NO cardiac cath at [facility] felt to be hypoxic vs  VT induced  · On metoprolol 50mg BID, Brilinta 90 mg BID and Lipitor 40 mg qd     A-fib (HCC)  Assessment & Plan  Follows closely with Cardiology  Rhythm controlled with amiodarone 100 mg daily and AC with Eliquis 5 mg BID    Type 2 diabetes mellitus with hyperglycemia [facility])  Assessment & Plan  Lab Results   Component Value Date    HGBA1C 12 7 (H) 05/22/2022       Recent Labs     08/16/22  1042 08/16/22  1601 08/16/22  2050 08/17/22  0612   POCGLU 339* 260* 258* 147*     Uncontrolled per most recent A1c  Home regimen: Lantus 16U QHS and Novolog 4U TID  Previous inpatient regimen: Lantus 45U qHS + Humalog 22U TID + SSI 4  Current regimen: Lantus 20 units QHS and SSI 6  Will monitor closely and possibly increase Lantus depending on coverage  Will continue for now and adjust as needed to maintain  - 180      Hypertension  Assessment & Plan  BP stable  On home losartan 50 mg BID, follows closely with Cardiology  Losartan on hold  Monitor BP closely     Hyperlipidemia associated with type 2 diabetes mellitus (HCC)  Assessment & Plan  - Continue Atorvastatin 40 mg daily  Diabetic peripheral neuropathy (HCC)  Assessment & Plan  Continue PTA Lyrica as documented     Anxiety  Assessment & Plan  On Lexapro 10 mg daily as well as Atarax prn           Subjective/Objective     Subjective:   Patient seen and examined at bedside  No overnight events  Patient denies any complains this AM  Discussed with patient about placement at [facility]  Patient will COVID vaccine card, states she will discuss with her son about bringing it  Agreeable to placement  All questions answered  Objective:  Vitals: Blood pressure 130/82, pulse 61, temperature 98 4 °F (36 9 °C), temperature source Oral, resp  rate 17, weight 80 kg (176 lb 5 9 oz), SpO2 99 %  ,Body mass index is 26 05 kg/m²        Intake/Output Summary (Last 24 hours) at 8/17/2022 0930  Last data filed at 8/17/2022 0600  Gross per 24 hour   Intake 300 ml   Output 1000 ml   Net -700 ml       Invasive Devices  Report    Peripherally Inserted Central Catheter Line  Duration PICC Line 08/11/22 Right Brachial 6 days          Drain  Duration           External Urinary Catheter 3 days          Airway  Duration           Surgical Airway Shiley Fenestrated 168 days                Physical Exam  Vitals reviewed  Constitutional:       General: She is not in acute distress      Appearance: She is not ill-appearing  HENT:      Head: Normocephalic and atraumatic       Right Ear: External ear normal       Left Ear: External ear normal       Mouth/Throat:      Mouth: Mucous membranes are moist    Eyes:      Conjunctiva/sclera: Conjunctivae normal    Cardiovascular:      Rate and Rhythm: Normal rate and regular rhythm       Heart sounds: Normal heart sounds  Pulmonary:      Effort: Pulmonary effort is normal  No respiratory distress       Comments: Trach collar in place  Chest:      Comments: CT site clean  Abdominal:      General: Bowel sounds are normal       Palpations: Abdomen is soft       Tenderness: There is no abdominal tenderness  Musculoskeletal:         General: No swelling or deformity       Cervical back: Normal range of motion       Right lower leg: No edema       Left lower leg: No edema  Skin:     General: Skin is warm       Coloration: Skin is not pale       Findings: No erythema or rash  Neurological:      Mental Status: She is alert         Lab, Imaging and other studies: I have personally reviewed pertinent reports       Results from last 7 days   Lab Units 08/16/22  0452 08/11/22  0523   WBC Thousand/uL 9 05 7 94   HEMOGLOBIN g/dL 8 3* 10 2*   HEMATOCRIT % 27 5* 34 9   PLATELETS Thousands/uL 587* 484*   NEUTROS PCT %  --  74   MONOS PCT %  --  9     Results from last 7 days   Lab Units 08/17/22  0604 08/16/22  0452 08/15/22  0532   POTASSIUM mmol/L 5 4* 5 4* 5 1   CHLORIDE mmol/L 107 105 103   CO2 mmol/L 29 30 31   BUN mg/dL 43* 42* 42*   CREATININE mg/dL 1 89* 1 68* 1 72*   CALCIUM mg/dL 8 5 8 7 8 9         Lab Results   Component Value Date    PHOS 4 6 (H) 07/18/2022    PHOS 3 9 07/17/2022    PHOS 4 2 07/16/2022              VTE Pharmacologic Prophylaxis: Reason for no pharmacologic prophylaxis Eliquis      VTE Mechanical Prophylaxis: sequential compression device

## 2022-08-18 VITALS
RESPIRATION RATE: 20 BRPM | TEMPERATURE: 97.9 F | DIASTOLIC BLOOD PRESSURE: 82 MMHG | SYSTOLIC BLOOD PRESSURE: 150 MMHG | BODY MASS INDEX: 26.4 KG/M2 | WEIGHT: 178.79 LBS | OXYGEN SATURATION: 96 % | HEART RATE: 61 BPM

## 2022-08-18 DIAGNOSIS — E11.65 TYPE 2 DIABETES MELLITUS WITH HYPERGLYCEMIA, WITH LONG-TERM CURRENT USE OF INSULIN (HCC): ICD-10-CM

## 2022-08-18 DIAGNOSIS — Z79.4 TYPE 2 DIABETES MELLITUS WITH HYPERGLYCEMIA, WITH LONG-TERM CURRENT USE OF INSULIN (HCC): ICD-10-CM

## 2022-08-18 LAB
ANION GAP SERPL CALCULATED.3IONS-SCNC: 3 MMOL/L (ref 4–13)
BUN SERPL-MCNC: 43 MG/DL (ref 5–25)
CALCIUM SERPL-MCNC: 8.9 MG/DL (ref 8.3–10.1)
CHLORIDE SERPL-SCNC: 105 MMOL/L (ref 96–108)
CO2 SERPL-SCNC: 29 MMOL/L (ref 21–32)
CREAT SERPL-MCNC: 1.85 MG/DL (ref 0.6–1.3)
GFR SERPL CREATININE-BSD FRML MDRD: 30 ML/MIN/1.73SQ M
GLUCOSE SERPL-MCNC: 182 MG/DL (ref 65–140)
GLUCOSE SERPL-MCNC: 240 MG/DL (ref 65–140)
POTASSIUM SERPL-SCNC: 5.6 MMOL/L (ref 3.5–5.3)
SODIUM SERPL-SCNC: 137 MMOL/L (ref 135–147)

## 2022-08-18 PROCEDURE — 99238 HOSP IP/OBS DSCHRG MGMT 30/<: CPT | Performed by: FAMILY MEDICINE

## 2022-08-18 PROCEDURE — 82948 REAGENT STRIP/BLOOD GLUCOSE: CPT

## 2022-08-18 PROCEDURE — 94760 N-INVAS EAR/PLS OXIMETRY 1: CPT

## 2022-08-18 PROCEDURE — 80048 BASIC METABOLIC PNL TOTAL CA: CPT | Performed by: SURGERY

## 2022-08-18 PROCEDURE — 99232 SBSQ HOSP IP/OBS MODERATE 35: CPT | Performed by: INTERNAL MEDICINE

## 2022-08-18 RX ORDER — LIDOCAINE 50 MG/G
2 PATCH TOPICAL DAILY
Qty: 30 PATCH | Refills: 0
Start: 2022-08-18

## 2022-08-18 RX ORDER — BENZONATATE 100 MG/1
100 CAPSULE ORAL 3 TIMES DAILY PRN
Qty: 20 CAPSULE | Refills: 0
Start: 2022-08-18 | End: 2022-10-27 | Stop reason: ALTCHOICE

## 2022-08-18 RX ORDER — MIRTAZAPINE 7.5 MG/1
7.5 TABLET, FILM COATED ORAL
Qty: 30 TABLET | Refills: 0
Start: 2022-08-18 | End: 2022-10-27 | Stop reason: SDUPTHER

## 2022-08-18 RX ORDER — HYDROXYZINE HYDROCHLORIDE 25 MG/1
25 TABLET, FILM COATED ORAL EVERY 6 HOURS PRN
Qty: 30 TABLET | Refills: 0
Start: 2022-08-18 | End: 2022-10-27 | Stop reason: ALTCHOICE

## 2022-08-18 RX ORDER — PREGABALIN 25 MG/1
25 CAPSULE ORAL EVERY EVENING
Qty: 30 CAPSULE | Refills: 0 | Status: SHIPPED | OUTPATIENT
Start: 2022-08-18 | End: 2022-08-18

## 2022-08-18 RX ORDER — INSULIN LISPRO 100 [IU]/ML
5 INJECTION, SOLUTION INTRAVENOUS; SUBCUTANEOUS
Qty: 15 ML | Refills: 0 | Status: ON HOLD
Start: 2022-08-18 | End: 2022-08-30 | Stop reason: SDUPTHER

## 2022-08-18 RX ORDER — FERROUS SULFATE 325(65) MG
325 TABLET ORAL
Qty: 30 TABLET | Refills: 0
Start: 2022-08-18

## 2022-08-18 RX ORDER — INSULIN GLARGINE 100 [IU]/ML
30 INJECTION, SOLUTION SUBCUTANEOUS
Qty: 10 ML | Refills: 0
Start: 2022-08-18 | End: 2022-10-27 | Stop reason: CLARIF

## 2022-08-18 RX ORDER — LANOLIN ALCOHOL/MO/W.PET/CERES
CREAM (GRAM) TOPICAL 2 TIMES DAILY
Qty: 113 G | Refills: 0
Start: 2022-08-18 | End: 2022-09-12 | Stop reason: ALTCHOICE

## 2022-08-18 RX ORDER — ACETAMINOPHEN 325 MG/1
975 TABLET ORAL EVERY 8 HOURS SCHEDULED
Qty: 30 TABLET | Refills: 0
Start: 2022-08-18

## 2022-08-18 RX ORDER — ATORVASTATIN CALCIUM 40 MG/1
40 TABLET, FILM COATED ORAL
Qty: 30 TABLET | Refills: 0
Start: 2022-08-18 | End: 2022-10-27 | Stop reason: SDUPTHER

## 2022-08-18 RX ADMIN — TICAGRELOR 90 MG: 90 TABLET ORAL at 09:33

## 2022-08-18 RX ADMIN — APIXABAN 5 MG: 5 TABLET, FILM COATED ORAL at 09:33

## 2022-08-18 RX ADMIN — METOPROLOL SUCCINATE 50 MG: 50 TABLET, EXTENDED RELEASE ORAL at 05:18

## 2022-08-18 RX ADMIN — PREGABALIN 75 MG: 75 CAPSULE ORAL at 09:33

## 2022-08-18 RX ADMIN — PANTOPRAZOLE SODIUM 40 MG: 40 TABLET, DELAYED RELEASE ORAL at 05:11

## 2022-08-18 RX ADMIN — AMIODARONE HYDROCHLORIDE 100 MG: 200 TABLET ORAL at 09:37

## 2022-08-18 RX ADMIN — HYDROMORPHONE HYDROCHLORIDE 2 MG: 2 TABLET ORAL at 10:38

## 2022-08-18 RX ADMIN — ESCITALOPRAM OXALATE 10 MG: 10 TABLET ORAL at 09:33

## 2022-08-18 RX ADMIN — INSULIN LISPRO 10 UNITS: 100 INJECTION, SOLUTION INTRAVENOUS; SUBCUTANEOUS at 10:59

## 2022-08-18 NOTE — DISCHARGE SUMMARY
Discharge Summary    Name: Melinda Arana  : 1965  MRN: [id number]      Admission Date: 2022   Discharge Date: 2022    Principal Problem:    Empyema lung, Right  Active Problems:    Anxiety    Diabetic peripheral neuropathy (HCC)    Hyperlipidemia associated with type 2 diabetes mellitus ([facility] )    Hypertension    Type 2 diabetes mellitus with hyperglycemia ([facility])    A-fib ([facility])    CAD (coronary artery disease)    Tracheostomy in place ([facility])    Anemia    CKD (chronic kidney disease) stage 3, GFR 30-59 ml/min ([facility])    Acute Respiratory insufficiency on chronic hypoxic respiratory failure    Pain at Chest tube insertion site  Chest wall wound infection    Acute on chronic heart failure with preserved ejection fraction ([facility])        Diagnosis: Empyema lung ([facility] ) [J86 9]    H&P:   (Per Admitting Provider)   Brando Sanchez a 65 y o  female with a history of CKD3, CAD s/p arrest and PCI on Brilinta, HFrEF (50%), AFib on eliquis, uncontrolled T2DM, and 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  She presents as a direct transfer from [address] [place] [address] for CTS evaluation for possible VATS procedure due to right lung empyema  She originally presented to [address] [place] [address] on  with chest pain and SOB and found to have spontaneous pneumothorax for which she got a chest tube placed by IR on , upsized on   Patient then developed severe pain at the site of chest tube insertion requiring removal on   At this time imaging showed persistent pneumothorax and empyema for which patient was transferred to [address] [place] [address] for repeat chest tube insertion  Pain was also severe enough to warrant APS consult who carried out peripheral block to help with pain control  She was given two rounds of TPA dornase on  and  which resulted in bleeding  She was also started on vanco and cefepime which was eventually narrowed down to vanc after wound and sputum cultures grew MRSA   ID was following and recommend continued antibiotics through 8/16  On 8/2, repeat CT scan showed continued loculations prompting Pulmonology attending, Dr Dmitry Rivera to discuss with thoracic team of [facility] about evaluation for possible VATS procedure  For this reason, patient was transfered to [facility] for evaluation  On arrival she is on 12L via trach collar and is endorsing right sided pain worsened with deep inspiration  She is otherwise stable and reports no new concerns  Hospital Course:   Patient was a transfer from [facility] for cardio thoracic surgery evaluation for VATS procedure due to right lung empyema  Patient had VATS decoration procedure on 8/6 without any complications  She had apical chest tubes placed and was eventually removed on 8/9 and eventually both chest tubes were removed  Due to MRSA positive fluid cultures from prior anterior chest tube site, Infectious disease was following patient for antibiotic management  She was treated with vancomycin and will continue through 8/20  During procedure, patient had acute blood loss with low Hemoglobin  She required a total of 5 units of pRBC's through out hospital stay to optimize her hemoglobin  Patient noted with a history  of CHF, was initially admitted on 7/15 with CHF exacerbation was optimized with Bumex 2mg BID and Aldactone 100mg daily  However patient was noted to have worsening renal function and medications were held  She will need follow up with cardiology  She already has appointment set up on 8/25 at 3 25pm    Patient with uncontrolled diabetes, through out her hospital stay, her insulin was continually adjusted to maintain -180  She was eventually discharged on Lantus 30units and humalog 5units TID and recommend carb controlled diet  On day of discharge, patient was stable and agreeable to discharge plans to [facility]  Complications: None    Procedures Performed:   8/6: VATS Decortation  procedure       Significant Findings/Abnormal Results with this admission:  · 8/1 - CXR: Small component of right pleural effusion suspected which may be partially loculated   Indwelling right thoracostomy tube without pneumothorax  · 8/2 CT Chest:  Decreased size of a right-sided pleural effusion and improved aeration of the right lower lobe post placement of a right pleural drainage catheter, with unchanged appearance of a loculated component of the effusion superomedially  There is slightly  increased density of the effusion compatible with a small amount of blood products  2   Appearance of new inflammatory groundglass opacities in the left lower lobe  3   Mild background interstitial edema is unchanged  Exam on Day of Discharge:   Physical Exam  Vitals and nursing note reviewed  Constitutional:       General: She is not in acute distress  Appearance: Normal appearance  She is well-developed  She is not ill-appearing, toxic-appearing or diaphoretic  HENT:      Head: Normocephalic and atraumatic  Eyes:      Conjunctiva/sclera: Conjunctivae normal    Cardiovascular:      Rate and Rhythm: Normal rate and regular rhythm  Heart sounds: No murmur heard  Pulmonary:      Effort: Pulmonary effort is normal  No respiratory distress  Breath sounds: Normal breath sounds  Comments: +Trach collar in place  Abdominal:      General: Bowel sounds are normal  There is no distension  Palpations: Abdomen is soft  Tenderness: There is no abdominal tenderness  There is no guarding  Musculoskeletal:         General: Normal range of motion  Right lower leg: No edema  Left lower leg: No edema  Skin:     General: Skin is warm and dry  Comments: +Chest tube site - stitches in place  Area clean/dry  Neurological:      Mental Status: She is alert  Mental status is at baseline  Psychiatric:         Mood and Affect: Mood normal          Behavior: Behavior normal          Thought Content:  Thought content normal          Judgment: Judgment normal  Condition at Discharge: stable     Discharge Medications:  See after visit summary for reconciled discharge medications provided to patient and family  Medication changes made with this admission:  · Start[de-identified]  · Vancomycin till 8/20  · Lantus 30 units QHS and Humalog 5units TID with meals     · Hold: Bumex and spironolactone due to MODESTO     · Resume: Resume all prior to admission medications    Important Physician Related Follow Up:   · PCP  · Cardiology - 8/25 at 3:23pm   · Endocrinology     Discharge instructions/Information to patient and family:   See after visit summary for information provided to patient and family  Provisions for Follow-Up Care:  See after visit summary for information related to follow-up care and any pertinent home health orders  Disposition: Discharge to SNF    Discharge Statement   I spent 20 minutes discharging the patient  This time was spent on the day of discharge  I had direct contact with the patient on the day of discharge  Additional documentation is required if more than 30 minutes were spent on discharge       Planned Readmission: No    Lorine Skiff, MD [facility]  2  PGY 3  8/18/2022  11:09 AM

## 2022-08-18 NOTE — UTILIZATION REVIEW
Notification of Discharge   This is a Notification of Discharge from our facility [address]  Please be advised that this patient has been discharge from our facility  Below you will find the admission and discharge date and time including the patients disposition  UTILIZATION REVIEW CONTACT:  Ángel Huynh  Utilization   Network Utilization Review Department  Phone: [phone] x carefully listen to the prompts  All voicemails are confidential   Email: [email or web address]     PHYSICIAN ADVISORY SERVICES:  FOR PAHK-YH-RODU REVIEW - MEDICAL NECESSITY DENIAL  Phone: [phone]  Fax: [phone]  Email: [email or web address]     PRESENTATION DATE: 8/2/2022  8:31 PM  OBERVATION ADMISSION DATE:   INPATIENT ADMISSION DATE: 8/2/22  8:31 PM   DISCHARGE DATE: 8/18/2022  2:48 PM  DISPOSITION: Non [facility] Non [facility]      IMPORTANT INFORMATION:  Send all requests for admission clinical reviews, approved or denied determinations and any other requests to dedicated fax number below belonging to the campus where the patient is receiving treatment   List of dedicated fax numbers:  [address] DENIALS (Administrative/Medical Necessity) [phone]   [address] (Maternity/NICU/Pediatrics) [phone]   Sumanjennifer Vallejo [phone]   130 [facility] [phone]   [address] [place] [address] [phone]   2000 [facility] [address] [place] [address],[address] [phone]   [facility]  [phone]   [address] [place] [address], [facility]  2401 [facility] 1000 [facility] [phone]

## 2022-08-18 NOTE — ASSESSMENT & PLAN NOTE
Lab Results   Component Value Date    HGBA1C 12 7 (H) 05/22/2022       Recent Labs     08/17/22  1557 08/17/22  1559 08/17/22  1856 08/17/22 2046   POCGLU 448* 437* 351* 249*     Uncontrolled per most recent A1c  Home regimen: Lantus 16U QHS and Novolog 4U TID  Previous inpatient regimen: Lantus 45U qHS + Humalog 22U TID + SSI 4  Current regimen: Lantus 25 units QHS and SSI 6  possibly increase Lantus based on coverage  Will continue for now and adjust as needed to maintain  - 180

## 2022-08-18 NOTE — ASSESSMENT & PLAN NOTE
Results from last 7 days   Lab Units 08/18/22  0510 08/17/22  0604 08/16/22  0452 08/15/22  0532 08/14/22  0448 08/13/22  0557 08/12/22  0442   BUN mg/dL 43* 43* 42* 42* 40* 37* 32*   CREATININE mg/dL 1 85* 1 89* 1 68* 1 72* 1 73* 1 71* 1 48*   EGFR ml/min/1 73sq m 30 29 33 32 32 33 39     · MODESTO noted on initial admission to [facility] with elevated Cr 1 64, initially resolved  (Baseline Cr being 0 9-1 0)  · Cr now bumped again during admission, discontinued Bumex and Aldactone   · Continue to monitor closely   Losartan on hold  · Avoid nephrotoxic agents

## 2022-08-18 NOTE — ASSESSMENT & PLAN NOTE
· STEMI 10/22/2021 s/p PATRICA mid circumflex OM1  OM2 was ballooned but not stented    · Pulseless VT 10/27/21 - repeat LHC 90% mid circumflex stenosis, but could not be intervened on  · VT 10/28/21 LHC:  found to have apical LAD complete occlusion was felt to be small to be intervened    · Cardiac carrest 1/1/22 - NO cardiac cath at [facility] felt to be hypoxic vs  VT induced  · On metoprolol 50mg BID, Brilinta 90 mg BID and Lipitor 40 mg qd

## 2022-08-18 NOTE — ASSESSMENT & PLAN NOTE
Wt Readings from Last 3 Encounters:   08/18/22 81 1 kg (178 lb 12 7 oz)   08/02/22 77 7 kg (171 lb 6 4 oz)   07/19/22 78 2 kg (172 lb 6 4 oz)     · Initially admitted to [facility] on 07/15 with CHF exacerbation, now optimized  · Most recent EF 50% with normal systolic and diastolic function on 0/13/5866  Currently stable and euvolemic  · Discontinued Bumex 2 mg daily with Aldactone 100 mg daily due to worsening creatinine   · Continue low-salt diet, strict I's and O's monitoring  · Per Cardiology at [facility] upon discharge    · Will need follow-up with General Cardiology & EP service upon discharge for ICD consideration

## 2022-08-18 NOTE — ASSESSMENT & PLAN NOTE
· Improved  · Prior complains of intractable pain at prior chest tube site on right anterior chest wall    This was removed 7/24/22  · Was followed by APS back in [facility], S/P epidural catheter which was removed 8/2  · Current pain regimen: Diaz Tylenol 975 Q8h, PO Dilaudid 2/4 for mod/severe, IV Dilaudid 0 5 mg Q2  · Bowel regimen:  Scheduled senna and MiraLax daily  · Both chest tubes now removed - last chest tube removed on 8/10 by CTS

## 2022-08-18 NOTE — DISCHARGE INSTR - AVS FIRST PAGE
- Patient will need to continue treatment with vancomycin till 8/20 for MRSA  - Repeat BMP on 8/19  -

## 2022-08-18 NOTE — ASSESSMENT & PLAN NOTE
Results from last 7 days   Lab Units 08/16/22  0452   HEMOGLOBIN g/dL 8 3*   HEMATOCRIT % 27 5*     Most likely anemia of chronic disease due to prolonged illness  No active bleeding noted - Hb is stable  Hg (8/4): 6 8 s/p  5 unit PRBC's since this admission  Hb stable, monitor closely  Consider transfusing if < 8 due to history of CAD  Continue Iron replacement as well as daily miralax

## 2022-08-18 NOTE — ASSESSMENT & PLAN NOTE
· Resolved  · Recurrent loculated right pleural effusion  S/P right-sided chest tube insertion and removal on 07/24  · CT of the chest on 7/28/22 revealed moderate partially loculated right pleural effusion  · Right chest tube re-placed on 7/29/22 by IR  · Pleural fluid analysis shows neutrophil predominant WBC count  · Fluid cultures from prior anterior chest tube site positive for MRSA  · 8/1 - CXR: Small component of right pleural effusion suspected which may be partially loculated  Indwelling right thoracostomy tube without pneumothorax  · 8/2 CT Chest:  Decreased size of a right-sided pleural effusion and improved aeration of the right lower lobe post placement of a right pleural drainage catheter, with unchanged appearance of a loculated component of the effusion superomedially  There is slightly  increased density of the effusion compatible with a small amount of blood products  2   Appearance of new inflammatory groundglass opacities in the left lower lobe  3   Mild background interstitial edema is unchanged  · 8/6 - S/P VATS Decortation  procedure  · 8/9 - Apical chest tube removed by thoracic surgery   · 8/11 - patient medically cleared for discharge to [facility]  Awaiting insurance authorization     · 8/11- PICC line placed   Plan:  - Continue Abx w/ Vanc given MRSA, Vanc will need to be continued through 8/20 per ID  - Aggressive pulmonary toilet  - Monitor fever curve and white count closely - patient has remained afebrile with stable white count

## 2022-08-18 NOTE — CASE MANAGEMENT
Case Management Discharge Note    Patient name Alisha Russell  Location [facility] MRN [id number]  : 1965 Date 2022       Current Admission Date: 2022  Current Admission Diagnosis:Empyema lung, Right      LOS (days): 16  Geometric Mean LOS (GMLOS) (days): 9 60  Days to GMLOS:-5 9     OBJECTIVE:  Risk of Unplanned Readmission Score: 68 44     Current admission status: Inpatient     Primary Insurance: [facility]  Secondary Insurance: [employer]:    Discharge planning discussed with[de-identified] Patient  Freedom of Choice: Yes  CM contacted family/caregiver?: Yes  Were Treatment Team discharge recommendations reviewed with patient/caregiver?: Yes  Did patient/caregiver verbalize understanding of patient care needs?: Yes  Were patient/caregiver advised of the risks associated with not following Treatment Team discharge recommendations?: Yes    Contacts  Patient Contacts: Terrie Nielson (pt's son)  Relationship to Patient[de-identified] Family  Contact Method: Phone  Phone Number: [phone] (mobile)  Reason/Outcome: Emergency Contact    Treatment Team Recommendation: Short Term Rehab  Discharge Destination Plan[de-identified] Short Term Rehab  Transport at Discharge : S Ambulance  Dispatcher Contacted: Yes  Number/Name of Dispatcher: [phone]  Transported by [facility] and Unit #): [facility]  ETA of Transport (Date): 22  ETA of Transport (Time): 1300 (1:00PM)  Transfer Mode: Stretcher  Accompanied by: EMS personnel  Transfer Equipment: BLS devices    Additional Comments: Pt is cleared for d/c by [facility] Medicine resident Dr Elizabeth Jarvis was notified of pt's d/c order  Pt is accepted for short-term skilled rehab placement at [address] [place] [address] Dr NUGENT located in [facility]  CM submitted for and obtained auth# [id number] for SNF rehab placement from pt's [facility]   CM arranged BLS ambulance via [address] [place] [address] 1:00PM pickup this day to transport pt to SNF  No auth required for transport form pt's insurance due to aocbbmvx-au-qvgfccxe transfer  The pt and her son Nury Agee were both informed of d/c  IMM signed by pt  CMN for transport completed by CM  Chart copy for SNF requested from RN  DEREK to follow      Accepting Facility Name, [facility] : [facility]  Receiving Facility/Agency Phone Number: [phone]  Facility/Agency Fax Number: [phone]

## 2022-08-18 NOTE — CONSULTS
Vancomycin IV Pharmacy-to-Dose Consultation     Dick Ruiz is a 65 y o  female who is currently receiving Vancomycin IV with management by the Pharmacy Consult service for the treatment of empyema  Assessment/Plan:     The patient's chart was reviewed  Patient is receiving pulse dosing due to MODESTO  Renal function has worsened: Scr 1 23 (08/10), 1 68 (08/15), 1 85 (08/18)  Patient's random level scheduled for this morning was not obtained due to patient being discharged to SNF  Per Dr Alexander Force give one dose of vancomycin 750 mg tomorrow (08/19) and then conclude therapy  Pharmacy will now sign off  Thank you for the consult         Eusebia Stephenson, Pharmacist

## 2022-08-18 NOTE — ASSESSMENT & PLAN NOTE
· Trach placed in the context of cardiac arrest/prolonged ventilator dependent state November 2021     · Decannulated at [facility] 12/11/21  · Patient requires frequent tracheostomy changes and suctioning  · On trach collar O2 with humidification and tolerating well btw 6-8L

## 2022-08-18 NOTE — PROGRESS NOTES
Progress Note - Infectious Disease   Joslyn Oconnor 65 y o  female MRN: [id number]  Unit/Bed#: [facility] 429-01 Encounter: [id number]      Impression/Recommendations:  1  Probable right-sided empyema   Patient failed attempted chest tube drainage  She is now status post VATS/decortication  All pleural fluid culture had no growth but most likely secondary to prior antibiotic   Given MRSA in right chest wound, MRSA is likely pathogen in empyema   Patient is clinically much improved  Continue IV vancomycin  Monitor temperature/WBC  Follow-up final operative culture  Treat x 2 weeks after VATS, through 8/20      2  Sepsis, with leukocytosis and tachypnea, likely secondary to empyema above   Patient is clinically and systemically improved   WBC has normalized   Tachypnea resolved  Gardena Kappa Antibiotic plan as in below  Monitor temperature/WBC  Monitor hemodynamics      3  MRSA right chest wall infection, as site recent/prior chest tube placement for spontaneous pneumothorax  Antibiotic plan as in above  Serial exams      4  Acute on chronic hypoxic respiratory failure, likely multifactorial   Respiratory culture with MRSA and Pseudomonas but no obvious pneumonia   These isolates are most likely airway colonization  Management per primary service      5  Recent spontaneous pneumothorax, status post chest tube placement on 07/20   This was removed on 07/24      6  DM, type 2, poorly controlled, with hyperglycemia and elevated hemoglobin A1c   This is risk factor for infection above  Management per primary service      7  MODESTO   Creatinine up and down,  stable  Antibiotic dosages adjusted accordingly  Monitor creatinine      Discussed with patient in detail regarding the above plan  Discharge plan per primary service      Antibiotics:  Vancomycin # 23  POD # 12     Subjective:  Patient is comfortable  Minimal chest wall pain  Temperature stays down   No chills    She is tolerating antibiotic well   No nausea, vomiting or diarrhea      Objective:  Vitals:  Temp:  [97 9 °F (36 6 °C)-98 3 °F (36 8 °C)] 97 9 °F (36 6 °C)  HR:  [60-98] 61  Resp:  [20-21] 20  BP: (123-150)/(67-82) 150/82  SpO2:  [91 %-99 %] 96 %  Temp (24hrs), Av 2 °F (36 8 °C), Min:97 9 °F (36 6 °C), Max:98 3 °F (36 8 °C)  Current: Temperature: 97 9 °F (36 6 °C)    Physical Exam:     General: Awake, alert, cooperative, no distress  Neck:  Supple  No mass  No lymphadenopathy  Lungs: Slight decreased breath sounds on right, no rales, no wheezing, respirations unlabored  Heart:  Regular rate and rhythm, S1 and S2 normal, no murmur  Abdomen: Soft, nondistended, non-tender, bowel sounds active all four quadrants, no masses, no organomegaly  Extremities: Trace leg edema  No erythema/warmth  No ulcer  Nontender to palpation  Skin:  No rash  Neuro: Moves all extremities  Invasive Devices  Report    Peripherally Inserted Central Catheter Line  Duration           PICC Line 22 Right Brachial 6 days          Airway  Duration           Surgical Airway Shiley Fenestrated 169 days                Labs studies:   I have personally reviewed pertinent labs  Results from last 7 days   Lab Units 22  0510 22  0604 22  0452   POTASSIUM mmol/L 5 6* 5 4* 5 4*   CHLORIDE mmol/L 105 107 105   CO2 mmol/L 29 29 30   BUN mg/dL 43* 43* 42*   CREATININE mg/dL 1 85* 1 89* 1 68*   EGFR ml/min/1 73sq m 30 29 33   CALCIUM mg/dL 8 9 8 5 8 7     Results from last 7 days   Lab Units 22  0452   WBC Thousand/uL 9 05   HEMOGLOBIN g/dL 8 3*   PLATELETS Thousands/uL 587*           Imaging Studies:   I have personally reviewed pertinent imaging study reports and images in PACS  EKG, Pathology, and Other Studies:   I have personally reviewed pertinent reports

## 2022-08-19 ENCOUNTER — NURSING HOME VISIT (OUTPATIENT)
Dept: GERIATRICS | Facility: OTHER | Age: 57
End: 2022-08-19
Payer: COMMERCIAL

## 2022-08-19 ENCOUNTER — TRANSITIONAL CARE MANAGEMENT (OUTPATIENT)
Dept: FAMILY MEDICINE CLINIC | Facility: CLINIC | Age: 57
End: 2022-08-19

## 2022-08-19 ENCOUNTER — TELEPHONE (OUTPATIENT)
Dept: FAMILY MEDICINE CLINIC | Facility: CLINIC | Age: 57
End: 2022-08-19

## 2022-08-19 ENCOUNTER — TELEPHONE (OUTPATIENT)
Dept: INFECTIOUS DISEASES | Facility: CLINIC | Age: 57
End: 2022-08-19

## 2022-08-19 VITALS
HEART RATE: 80 BPM | WEIGHT: 178.2 LBS | DIASTOLIC BLOOD PRESSURE: 66 MMHG | TEMPERATURE: 98 F | BODY MASS INDEX: 26.32 KG/M2 | RESPIRATION RATE: 18 BRPM | SYSTOLIC BLOOD PRESSURE: 122 MMHG | OXYGEN SATURATION: 98 %

## 2022-08-19 DIAGNOSIS — N18.32 STAGE 3B CHRONIC KIDNEY DISEASE (HCC): ICD-10-CM

## 2022-08-19 DIAGNOSIS — T14.8XXA WOUND INFECTION: ICD-10-CM

## 2022-08-19 DIAGNOSIS — J86.9 EMPYEMA LUNG (HCC): Primary | ICD-10-CM

## 2022-08-19 DIAGNOSIS — A41.02 SEPSIS DUE TO METHICILLIN RESISTANT STAPHYLOCOCCUS AUREUS (MRSA) WITH ACUTE HYPOXIC RESPIRATORY FAILURE WITHOUT SEPTIC SHOCK (HCC): ICD-10-CM

## 2022-08-19 DIAGNOSIS — Z79.4 TYPE 2 DIABETES MELLITUS WITH HYPERGLYCEMIA, WITH LONG-TERM CURRENT USE OF INSULIN (HCC): ICD-10-CM

## 2022-08-19 DIAGNOSIS — R65.20 SEPSIS DUE TO METHICILLIN RESISTANT STAPHYLOCOCCUS AUREUS (MRSA) WITH ACUTE HYPOXIC RESPIRATORY FAILURE WITHOUT SEPTIC SHOCK (HCC): ICD-10-CM

## 2022-08-19 DIAGNOSIS — D64.9 ANEMIA, UNSPECIFIED TYPE: ICD-10-CM

## 2022-08-19 DIAGNOSIS — I46.9 CARDIAC ARREST (HCC): Primary | ICD-10-CM

## 2022-08-19 DIAGNOSIS — J93.83 OTHER PNEUMOTHORAX: ICD-10-CM

## 2022-08-19 DIAGNOSIS — R06.89 RESPIRATORY INSUFFICIENCY: ICD-10-CM

## 2022-08-19 DIAGNOSIS — I50.33 ACUTE ON CHRONIC HEART FAILURE WITH PRESERVED EJECTION FRACTION (HCC): ICD-10-CM

## 2022-08-19 DIAGNOSIS — I48.0 PAROXYSMAL ATRIAL FIBRILLATION (HCC): ICD-10-CM

## 2022-08-19 DIAGNOSIS — E11.65 TYPE 2 DIABETES MELLITUS WITH HYPERGLYCEMIA, WITH LONG-TERM CURRENT USE OF INSULIN (HCC): ICD-10-CM

## 2022-08-19 DIAGNOSIS — L08.9 WOUND INFECTION: ICD-10-CM

## 2022-08-19 DIAGNOSIS — J96.01 SEPSIS DUE TO METHICILLIN RESISTANT STAPHYLOCOCCUS AUREUS (MRSA) WITH ACUTE HYPOXIC RESPIRATORY FAILURE WITHOUT SEPTIC SHOCK (HCC): ICD-10-CM

## 2022-08-19 PROCEDURE — 99306 1ST NF CARE HIGH MDM 50: CPT | Performed by: FAMILY MEDICINE

## 2022-08-19 NOTE — TELEPHONE ENCOUNTER
Whit Cook from [employer] contacts office today regaring pts vanco      Staff states pt had a vanco ramdom this morning drawn at 0810 AM and it was 12  Staff requesting clarification on vanco order  Staff advised that Per Dr Sánchez Ou give one dose of vancomycin 750 mg tomorrow (08/19) and then conclude therapy  Staff informed that after this is done the PICC can be removed and there are no further labs that are needed at this time  If they have any further questions or concerns they are to call the office  Colleen verbalizes understanding

## 2022-08-19 NOTE — PROGRESS NOTES
Nely [address]  [address]   [facility]  History and Physical    NAME: Kelli Red  AGE: 65 y o  SEX: female [id number]    DATE OF ENCOUNTER: 8/19/2022    Code status:  CPR    Assessment and Plan     1  Empyema lung, Right  - recurrent loculated right pleural effusion, s/p right-sided chest tube insertion and removal in 7/24, re-placed on 7/29  - CT chest 8/2 showed unchanged appearance of a loculated component of the effusion, slightly increased density of the infusion compatible with a small amount of blood products  - s/p VATS decortication on 8/6  - continue levalbuterol neb and ipratropium neb via trach TID  - encourage incentive spirometry  - continue RT    2  Acute Respiratory insufficiency on chronic hypoxic respiratory failure  - s/p tracheostomy, uses 10L oxygen at home    3  Chest wall wound infection  - purulent discharge at site of chest tube placements, cultures positive for MRSA on 7/28 susceptible to vancomycin  - continue vanc through 8/20     4  Sepsis due to methicillin resistant Staphylococcus aureus (MRSA) with acute hypoxic respiratory failure without septic shock (HCC)  - Tachypnea and leukocytosis, likely 2/2 empyema in right lung   - respiratory culture with MRSA and Pseudomonas with no obvious pneumonia  - PICC placed 8/11  - continue Vancomycin through 8/20 per ID  - monitor fever curve and trend WBC    5  Other pneumothorax  - recent spontaneous pneumothorax, s/p chest tube placement 7/20 and removal 7/24, re-placed 7/29, and removal 8/9    6  Stage 3b chronic kidney disease ([facility] [address] )  - worsening renal function in setting of acute blood loss/VATS procedure and agressive diuresis  Bumex and aldactone was d/c  Losartan was on hold  - continue to trend BMP    7   Type 2 diabetes mellitus with hyperglycemia, with long-term current use of insulin (HCC)  - Uncontrolled DM with recent HbA1C 12 7 (5/22/22)  - FS 89 this morning  - continue current regimen with Lantus 30U and Humalog 5 units t i d  a c   - monitor BS x4 daily    8  Afib  - controlled with metoprolol succinate 50 mg b i d  and amiodarone 100 mg daily and AC with Eliquis 5 mg b i d     9  Anemia  - acute/chronic anemia   - Hgb 6 8 on admission (8/4), s/p total 5U PRBC  - Hgb 8 3 on 8/16  - monitor vitals, trend CBC    10  Hyperkalemia  - trending up to 5 6 yesterday (8/18)  - plan to give one dose of kayexalate, monitor OP, bladder scan, then hypodermoclysis 65 cc/hr and repeat BMP tomorrow  All medications and routine orders were reviewed and updated as needed  Plan discussed with: staff    Chief Complaint     Seen for admission at [address] [place] [address]    History of Present Illness     65 y o  female with PMHx of CKD stage 3, CAD s/p arrest and PCI on Brillinta, HFpEF (50%), Afib on Eliquis, uncontrolled T2DM and chronic trach dependence on 6L oxygen at baseline who was admitted to [address] [place] [address] (8/2 - 8/18) for VATS procedure with cardiothoracic surgery due to right lung empyema  VATS decortication was performed on 8/6 without any complications  She was treated with vancomycin for MRSA positive fluid cultures from prior anterior chest tube site  During VATS procedure, patient had acute blood loss with low hemoglobin  She required a total 5 units of pRBC's during hospital course  Patient was placed on bumex and aldactone for CHF exacerbation which were held later due to worsening renal function  Her DM was controlled with insulin  She was then discharged to [address] [place] [address] for short-term rehabilitation  Today, she is lying in bed, tired  She didn't answer my questions  She did shake her head when I asked about pain  No concerns from nursing staff       HISTORY:  Past Medical History:   Diagnosis Date    Anxiety     Aortic aneurysm (HCC)     Arthritis     Depression     Diabetes mellitus (HCC)     Fibromyalgia     GERD (gastroesophageal reflux disease)     GERD (gastroesophageal reflux disease)     H/O cardiovascular stress test 2018    no ischemia  EF 70%   H/O echocardiogram 2019    EF 60%  Mild LVH  trivial effusion   Hyperlipidemia     Hypertension     Migraines     Psychiatric disorder     anxiety    Uncontrolled hypertension 2015    Last Assessment & Plan:  BP today above goal <140/90, apparently asymptomatic  Prior BP elevations were attributed to not taking medication  Consider increased medication if persistent on f/u  Family History   Problem Relation Age of Onset    Hypertension Mother    Exhitesh Causey Arthritis Mother     Diabetes Father     Other Sister         renal cell carcinoma    Diabetes Other     Factor V Leiden deficiency Other      Social History     Socioeconomic History    Marital status: Legally      Spouse name: None    Number of children: None    Years of education: None    Highest education level: None   Occupational History    None   Tobacco Use    Smoking status: Former Smoker     Packs/day: 1 00     Years: 30 00     Pack years: 30 00     Types: Cigarettes    Smokeless tobacco: Never Used   Vaping Use    Vaping Use: Never used   Substance and Sexual Activity    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       Drug use: Yes     Types: Marijuana     Comment: medical; seldom use    Sexual activity: Yes     Birth control/protection: Female Sterilization     Comment: Monogamous relationship with monogamous partner   Other Topics Concern    None   Social History Narrative    Most recent tobacco use screenin2019    Do you currently or have you served in the "[employer]: No    Were you activated, into active duty, as a member of the [employer] or as a Reservist: No    Advance directive: No    Chewing tobacco: none    Alcohol intake: None    Marital status: Single    Live alone or with others: with others    Family history of heart disease:    aortic aneurysm    High cholesterol: Yes    High blood pressure: Yes    Exercise level: Heavy    Overweight: Yes    Obese: Yes    Diabetes: Yes    General stress level: High    Diet: Regular    Caffeine intake: Occasional    Guns present in home: No    Seat belts used routinely: Yes    Sexual orientation: Heterosexual    Sunscreen used routinely: No    Seat belt/car seat used routinely: Yes    Exercise-How often do you exercise: as tolerated    Do you have regular medical check ups: Yes    Do you have regular dental check ups: Yes    Illicit drugs-years of use:    recovery 23 years - HX of     Social Determinants of Health     Financial Resource Strain: Not on file   Food Insecurity: Food Insecurity Present    Worried About Running Out of Food in the Last Year: Sometimes true    Lela of Food in the Last Year: Sometimes true   Transportation Needs: No Transportation Needs    Lack of Transportation (Medical): No    Lack of Transportation (Non-Medical): No   Physical Activity: Not on file   Stress: Not on file   Social Connections: Not on file   Intimate Partner Violence: Not on file   Housing Stability: Low Risk     Unable to Pay for Housing in the Last Year: No    Number of Places Lived in the Last Year: 1    Unstable Housing in the Last Year: No       Allergies: Allergies   Allergen Reactions    Metformin Diarrhea    Clonidine Rash     Patch        Review of Systems     Review of Systems   Unable to perform ROS: Other    Tracheostomy    Medications and orders     All medications reviewed and updated in Nursing Home EMR  Objective     Vitals:    08/19/22 0810   BP: 122/66   Pulse: 80   Resp: 18   Temp: 98 °F (36 7 °C)   SpO2: 98%   Weight: 80 8 kg (178 lb 3 2 oz)        Physical Exam  Vitals and nursing note reviewed  Constitutional:       General: She is not in acute distress  Appearance: She is not toxic-appearing  HENT:      Head: Atraumatic        Nose: Nose normal       Mouth/Throat:      Mouth: Mucous membranes are dry  Pharynx: No oropharyngeal exudate  Eyes:      General:         Right eye: No discharge  Left eye: No discharge  Conjunctiva/sclera: Conjunctivae normal    Neck:      Comments: Trach tube in midline, yellowish thick discharge from tube  Cardiovascular:      Rate and Rhythm: Normal rate and regular rhythm  Heart sounds: No murmur heard  Pulmonary:      Breath sounds: Rhonchi and rales present  Abdominal:      General: Bowel sounds are normal  There is no distension  Palpations: Abdomen is soft  Tenderness: There is no abdominal tenderness  Musculoskeletal:      Right lower leg: No edema  Left lower leg: No edema  Skin:     General: Skin is warm  Neurological:      Mental Status: She is alert  Comments: Strong hand   Moves all 4 ext  Psychiatric:         Behavior: Behavior normal          Pertinent Laboratory/Diagnostic Studies: The following labs/studies were reviewed please see chart or hospital paperwork for details      Results from last 7 days   Lab Units 08/16/22  0452   WBC Thousand/uL 9 05   HEMOGLOBIN g/dL 8 3*   HEMATOCRIT % 27 5*   PLATELETS Thousands/uL 587*     Results from last 7 days   Lab Units 08/18/22  0510 08/17/22  0604 08/16/22  0452   POTASSIUM mmol/L 5 6* 5 4* 5 4*   CHLORIDE mmol/L 105 107 105   CO2 mmol/L 29 29 30   BUN mg/dL 43* 43* 42*   CREATININE mg/dL 1 85* 1 89* 1 68*   CALCIUM mg/dL 8 9 8 5 8 7

## 2022-08-23 ENCOUNTER — NURSING HOME VISIT (OUTPATIENT)
Dept: GERIATRICS | Facility: OTHER | Age: 57
End: 2022-08-23
Payer: COMMERCIAL

## 2022-08-23 VITALS
TEMPERATURE: 97.4 F | HEART RATE: 72 BPM | DIASTOLIC BLOOD PRESSURE: 68 MMHG | WEIGHT: 178.2 LBS | OXYGEN SATURATION: 99 % | SYSTOLIC BLOOD PRESSURE: 138 MMHG | BODY MASS INDEX: 26.32 KG/M2 | RESPIRATION RATE: 18 BRPM

## 2022-08-23 DIAGNOSIS — I48.0 PAROXYSMAL ATRIAL FIBRILLATION (HCC): ICD-10-CM

## 2022-08-23 DIAGNOSIS — Z79.4 TYPE 2 DIABETES MELLITUS WITH HYPERGLYCEMIA, WITH LONG-TERM CURRENT USE OF INSULIN (HCC): ICD-10-CM

## 2022-08-23 DIAGNOSIS — N18.32 STAGE 3B CHRONIC KIDNEY DISEASE (HCC): ICD-10-CM

## 2022-08-23 DIAGNOSIS — Z93.0 TRACHEOSTOMY IN PLACE (HCC): ICD-10-CM

## 2022-08-23 DIAGNOSIS — T14.8XXA WOUND INFECTION: ICD-10-CM

## 2022-08-23 DIAGNOSIS — D64.9 ANEMIA, UNSPECIFIED TYPE: ICD-10-CM

## 2022-08-23 DIAGNOSIS — F41.9 ANXIETY: ICD-10-CM

## 2022-08-23 DIAGNOSIS — L08.9 WOUND INFECTION: ICD-10-CM

## 2022-08-23 DIAGNOSIS — E11.65 TYPE 2 DIABETES MELLITUS WITH HYPERGLYCEMIA, WITH LONG-TERM CURRENT USE OF INSULIN (HCC): ICD-10-CM

## 2022-08-23 DIAGNOSIS — J86.9 EMPYEMA LUNG (HCC): Primary | ICD-10-CM

## 2022-08-23 LAB
MYCOBACTERIUM SPEC CULT: NORMAL
MYCOBACTERIUM SPEC CULT: NORMAL
RHODAMINE-AURAMINE STN SPEC: NORMAL
RHODAMINE-AURAMINE STN SPEC: NORMAL

## 2022-08-23 PROCEDURE — 99309 SBSQ NF CARE MODERATE MDM 30: CPT | Performed by: NURSE PRACTITIONER

## 2022-08-23 NOTE — PROGRESS NOTES
[facility]  [address]  [phone]  [place]  Code 31 (STR)      NAME: Kelli Red  AGE: 65 y o  SEX: female CODE STATUS: CPR    DATE OF ENCOUNTER: 8/23/2022    Assessment and Plan     1  Empyema lung, Right  Assessment & Plan:  · Consider resolved at this time  · Recurrent loculated right pleural effusion  · S/p right-sided chest tube insertion with removal 724-replaced on 07/29   · Transferred to [facility] for VATS procedure done on 8/6/2022   · Plan  · Continue levalbuterol an aperture opium nebulizer via trach t i d    · Encourage out of bed t i d  with meals   · Encourage incentive spirometer   · Respiratory therapy to follow   · Trach care      2  Tracheostomy in place [facility])  Assessment & Plan:  · Patient has been trach dependent since November 2021 due to cardiac arrest requiring prolonged ventilator  · Patient was decannulated at [facility] 12/11/2021   · Continue trach care with suctioning  · Continue trach collar O2 between 6-8 L with humidification      3  Chest wall wound infection  Assessment & Plan:  · S/p chest tube insertion development of purulent discharge at chest tube site  · Cultures positive for MRSA  · Susceptible to vancomycin  · ID following   · PICC line placed 8/11/2022   ·  patient completed vancomycin on 8/20/2022  · Okay to remove PICC line  · Continue to monitor fever curve  · Patient denies any fever chills on exam   · Afebrile  · No leukocytosis on labs reviewed from Monday 8/22 at [facility]      4  Paroxysmal atrial fibrillation (HCC)  Assessment & Plan:  · Heart rate controlled on exam   · Continue metoprolol succinate 50 mg b i d    · Continue amiodarone 100 mg daily   · Continue Eliquis 5 mg b i d  for anticoagulation      5   Anemia, unspecified type  Assessment & Plan:  · Developed acute blood loss anemia s/p VATS procedure  · Patient also has CKD contributing to chronic anemia  · S/p 5 units PRBCs while inpatient   · Hemoglobin 8 3 on 08/16 prior to discharge to rehab  · H&H 7 5/22 4 on 8/22/2022 at [facility]   · Transfuse for hemoglobin less than 7 although due to history of CAD with cardiac arrest and passed may require blood transfusion for hemoglobin less than 8  · Continue iron supplement  · Repeat CBC Thursday 8/25/2022      6  Stage 3b chronic kidney disease [facility])  Assessment & Plan:  Lab Results   Component Value Date    EGFR 30 08/18/2022    EGFR 29 08/17/2022    EGFR 33 08/16/2022    CREATININE 1 85 (H) 08/18/2022    CREATININE 1 89 (H) 08/17/2022    CREATININE 1 68 (H) 08/16/2022     · Baseline creatinine 0 9-1 0   · Patient developed worsening kidney function while inpatient and her Bumex and Aldactone were discontinued as well as her losartan  · K 5 6 upon discharge received 1 dose of Kayexalate at [facility]   · Repeat BMP reviewed- K+within normal limits at 4 5   · BUN/creatinine 34/1 38 on 8/22/2022  Plan  · Repeat BMP on Thursday 8/25  · Avoid NSAIDs, avoid hypotension   · Renally dose medications as appropriate      7  Type 2 diabetes mellitus with hyperglycemia, with long-term current use of insulin [facility])  Assessment & Plan:    Lab Results   Component Value Date    HGBA1C 12 7 (H) 05/22/2022     · Last hemoglobin A1c 12 7 in May of 2022   · Home regimen:  Lantus 16 units q h s  and NovoLog 4 units t i d   · Insulin adjusted while inpatient   · Continue Lantus 30 units q h s  · Continue Humalog 5 units t i d  with meals   · Continue Lyrica for neuropathy  · Continue AC and HS blood sugar checks  · Hypoglycemic protocol      8  Anxiety  Assessment & Plan:  · Hx of Anxiety and Depression with Insomnia   · Continue Lexapro 10 mg daily for depression/anxiety  · Continue lorazepam 0 5 mg Q 8 hour p r n  for anxiety  · Continue mirtazapine 7 5 mg daily q h s  for sleep  · Continue hydroxyzine 25 mg q 6 hours p r n  for anxiety         All medications and routine orders were reviewed and updated as needed      Chief Complaint     STR follow up visit    Past Medical and Surgica History      Past Medical History:   Diagnosis Date    Anxiety     Aortic aneurysm ([address] )     Arthritis     Depression     Diabetes mellitus (HCC)     Fibromyalgia     GERD (gastroesophageal reflux disease)     GERD (gastroesophageal reflux disease)     H/O cardiovascular stress test 2018    no ischemia  EF 70%   H/O echocardiogram 2019    EF 60%  Mild LVH  trivial effusion   Hyperlipidemia     Hypertension     Migraines     Psychiatric disorder     anxiety    Uncontrolled hypertension 2015    Last Assessment & Plan:  BP today above goal <140/90, apparently asymptomatic  Prior BP elevations were attributed to not taking medication  Consider increased medication if persistent on f/u  Past Surgical History:   Procedure Laterality Date    BACK SURGERY      Lumbar epidural steroid injection    CARDIAC CATHETERIZATION N/A 10/22/2021    Procedure: Cardiac pci;  Surgeon: Marlene Mistry MD;  [facility]; Service: Cardiology    CARDIAC CATHETERIZATION  10/22/2021    Procedure: Cardiac catheterization;  Surgeon: Marlene Mistry MD;  [facility]; Service: Cardiology    CARDIAC CATHETERIZATION Left 10/27/2021    Procedure: Cardiac Left Heart Cath;  Surgeon: Sofia Porter MD;  [facility]; Service: Cardiology    CARDIAC CATHETERIZATION N/A 10/27/2021    Procedure: Cardiac pci;  Surgeon: Sofia Porter MD;  [facility]; Service: Cardiology    CARDIAC CATHETERIZATION N/A 10/28/2021    Procedure: Cardiac pci;  Surgeon: Perry Peterson DO;  Location: [facility] CARD[facility];   Service: Cardiology    CARPAL TUNNEL RELEASE Left      SECTION      CHOLECYSTECTOMY      COLONOSCOPY      incomplete    COLONOSCOPY      EYE SURGERY      HYSTERECTOMY      Total    IR CHEST TUBE PLACEMENT  2022    IR CHEST TUBE PLACEMENT  2022    IR CHEST TUBE PLACEMENT 7/29/2022    LUNG DECORTICATION Right 8/6/2022    Procedure: DECORTICATION LUNG;  Surgeon: Abel Robertson MD;  Location: [facility] MAIN OR;  Service: Thoracic    OVARIAN CYST REMOVAL      PEG W/TRACHEOSTOMY PLACEMENT N/A 11/12/2021    Procedure: TRACHEOSTOMY WITH INSERTION PEG TUBE;  Surgeon: Ortiz Hudson To, DO;  Location: [facility] MAIN OR;  Service: General    THORACOSCOPY VIDEO ASSISTED SURGERY (VATS) Right 8/6/2022    Procedure: THORACOSCOPY VIDEO ASSISTED SURGERY (VATS), RIGHT;  Surgeon: Abel Robertson MD;  Location: [facility] MAIN [facility];  Service: Thoracic    TUBAL LIGATION      UPPER GASTROINTESTINAL ENDOSCOPY       Allergies   Allergen Reactions    Metformin Diarrhea    Clonidine Rash     Patch           History of Present Illness     Theresa Lam is a 65year old female admitted to [address] [place] [address] on 8/18/22 for STR  Past Medical Hx including but not limited to Chronic Hypoxic Resp Failure, Trach dependent on 6 L NC, CHANTALE, AFIB on Eliquis, CAD s/p cardiac arrest s/p PCI on Brilinta, HTN, CVA, CHF (EF 50%), DM2 with neuropathy, GERD, CKD3,  seen in collaboration with nursing for medical mgmt and STR follow up  Hospital Course:   Presented to [facility] 8/2/2022 as a direct transfer from [facility] for cardiothoracic surgery eval for possible  VATS procedure due to right lung empyema  Patient originally presented to [facility] on 07/19 with chest pain and shortness of breath she was found to have a spontaneous pneumothorax s/p chest tube placed in IR on 07/21 later upsized on 07/22  Patient developed severe pain on the right side of her chest tube insertion subsequently with removal on 07/24  Imaging revealed persistent pneumothorax and empyema and patient was transferred to [facility] for repeat chest tube insertion  Patient was evaluated by acute pain services provided peripheral block to help with her pain control    She was treated with IV vancomycin and cefepime as her wound and sputum cultures grew MRSA  Id was following and recommended to continue antibiotics through 8/20/2022  Pulmonary also following who recommended  VATS procedure, this was done on 8/6/2022 due to her right lung empyema  Patient had apical chest tubes placed which were ventrally removed on 8/9/2022  Patient developed ABL a during procedure and required total of 5 units PRBCs throughout her hospital stay to maintain an optimum hemoglobin level  Of note patient also has history of CHF upon her initial admission 715 was treated with Bumex 2 mg b i d  and Aldactone 100 mg daily unfortunately patient did have worsening renal function and these medications were held  She will need to follow-up with cardiology as an outpatient there is is office visit scheduled 8/25/2022  Patient also had uncontrolled diabetes throughout her hospital stay, insulin was adjusted and eventually was discharged on Lantus 30 units q h s  and Humalog 5 units t i d  PT/OT recommended discharge to short-term rehab  Rehab:   Seen and examined at bedside today  Patient is a reliable historian  She is able to make her needs known, AAO X 3 on exam   Patient states she is having some trouble sleeping at rehab, states her mood is stable  Patient denies any pain on exam   Patient denies any issues with her trach, patient denies any trouble breathing  Patient states she is eating well denies any bowel or bladder issues  Patient offers no other complaints/concerns at this time  Per review of SNF records, Patient is eating 3 meals per day, consuming 100 %  Last documented BM was 8/22/2022  Patient is actively participating in therapy  No concerns from nursing at this time  The patient's allergies, past medical, surgical, social and family history were reviewed and unchanged  Review of Systems     Review of Systems   Constitutional: Negative for chills and fever     HENT: Negative for congestion, rhinorrhea and sore throat  Eyes: Negative for pain and visual disturbance  Respiratory: Negative for cough, shortness of breath and wheezing  Cardiovascular: Negative for chest pain and palpitations  Gastrointestinal: Negative for abdominal pain, constipation, diarrhea and nausea  Genitourinary: Negative for decreased urine volume, difficulty urinating, dysuria and hematuria  Musculoskeletal: Negative for arthralgias and back pain  Skin: Negative for color change and rash  Neurological: Negative for dizziness, syncope, weakness, numbness and headaches  Psychiatric/Behavioral: Positive for sleep disturbance  Negative for dysphoric mood  The patient is not nervous/anxious  Objective     Vitals:   Vitals:    08/23/22 1740   BP: 138/68   Pulse: 72   Resp: 18   Temp: (!) 97 4 °F (36 3 °C)   SpO2: 99%         Physical Exam  Vitals and nursing note reviewed  Constitutional:       General: She is not in acute distress  Appearance: Normal appearance  HENT:      Head: Normocephalic and atraumatic  Nose: No congestion or rhinorrhea  Mouth/Throat:      Mouth: Mucous membranes are moist       Comments: Trach midline   Eyes:      General: No scleral icterus  Conjunctiva/sclera: Conjunctivae normal       Pupils: Pupils are equal, round, and reactive to light  Neck:      Trachea: Tracheostomy present  No abnormal tracheal secretions or tracheal deviation  Cardiovascular:      Rate and Rhythm: Normal rate and regular rhythm  Pulses: Normal pulses  Heart sounds: Normal heart sounds  No murmur heard  Pulmonary:      Effort: Pulmonary effort is normal  No tachypnea, bradypnea or respiratory distress  Breath sounds: Decreased breath sounds present  No wheezing, rhonchi or rales  Abdominal:      General: Bowel sounds are normal  There is no distension  Palpations: Abdomen is soft  There is no mass  Tenderness: There is no abdominal tenderness        Hernia: No hernia is present  Musculoskeletal:         General: No swelling or tenderness  Cervical back: Normal range of motion  Lymphadenopathy:      Cervical: No cervical adenopathy  Skin:     General: Skin is warm and dry  Coloration: Skin is not pale  Findings: No rash  Neurological:      General: No focal deficit present  Mental Status: She is alert and oriented to person, place, and time  Mental status is at baseline  Motor: No weakness  Gait: Gait normal    Psychiatric:         Mood and Affect: Mood normal          Behavior: Behavior normal          Pertinent Laboratory/Diagnostic Studies:   Reviewed in facility chart-stable      Current Medications   Medications reviewed and updated see facility [facility] for details        Current Outpatient Medications:     escitalopram (LEXAPRO) 10 mg tablet, TAKE 1 TABLET BY MOUTH EVERY DAY, Disp: 90 tablet, Rfl: 1    acetaminophen (TYLENOL) 325 mg tablet, Take 3 tablets (975 mg total) by mouth every 8 (eight) hours, Disp: 30 tablet, Rfl: 0    albuterol (2 5 mg/3 mL) 0 083 % nebulizer solution, Take 3 mL (2 5 mg total) by nebulization every 4 (four) hours as needed for wheezing or shortness of breath, Disp: 180 mL, Rfl: 5    amiodarone 100 mg tablet, TAKE 1 TABLET BY MOUTH DAILY WITH BREAKFAST , Disp: 90 tablet, Rfl: 0    apixaban (ELIQUIS) 5 mg, Take 1 tablet (5 mg total) by mouth 2 (two) times a day, Disp: 180 tablet, Rfl: 0    atorvastatin (LIPITOR) 40 mg tablet, Take 1 tablet (40 mg total) by mouth daily with dinner, Disp: 30 tablet, Rfl: 0    benzonatate (TESSALON PERLES) 100 mg capsule, Take 1 capsule (100 mg total) by mouth 3 (three) times a day as needed for cough, Disp: 20 capsule, Rfl: 0    Blood Pressure Monitoring (Sphygmomanometer) MISC, Use 2 (two) times a day (Patient not taking: No sig reported), Disp: 1 each, Rfl: 0    ferrous sulfate 325 (65 Fe) mg tablet, Take 1 tablet (325 mg total) by mouth daily with breakfast, Disp: 30 tablet, Rfl: 0    hydrOXYzine HCL (ATARAX) 25 mg tablet, Take 1 tablet (25 mg total) by mouth every 6 (six) hours as needed for anxiety, Disp: 30 tablet, Rfl: 0    insulin glargine (LANTUS) 100 units/mL subcutaneous injection, Inject 30 Units under the skin daily at bedtime, Disp: 10 mL, Rfl: 0    insulin lispro (HumaLOG KwikPen) 100 units/mL injection pen, Inject 5 Units under the skin 3 (three) times a day with meals, Disp: 15 mL, Rfl: 0    Insulin Pen Needle 31G X 6 MM MISC, 1 Device by Does not apply route 4 (four) times a day, Disp: , Rfl:     ipratropium (ATROVENT) 0 02 % nebulizer solution, Take 2 5 mL (0 5 mg total) by nebulization 3 (three) times a day, Disp: , Rfl: 0    Lancets MISC, Use 1 Device 4 (four) times a day, Disp: , Rfl:     levalbuterol (XOPENEX) 1 25 mg/0 5 mL nebulizer solution, Take 0 5 mL (1 25 mg total) by nebulization 3 (three) times a day, Disp: , Rfl: 0    lidocaine (LIDODERM) 5 %, Apply 2 patches topically daily Remove & Discard patch within 12 hours or as directed by MD, Disp: 30 patch, Rfl: 0    LORazepam (Ativan) 1 mg tablet, Take 0 5 tablets (0 5 mg total) by mouth every 8 (eight) hours as needed for anxiety or sleep, Disp: 30 tablet, Rfl: 0    metoprolol succinate (TOPROL-XL) 50 mg 24 hr tablet, Take 1 tablet (50 mg total) by mouth every 12 (twelve) hours, Disp: 180 tablet, Rfl: 3    mirtazapine (REMERON) 7 5 MG tablet, Take 1 tablet (7 5 mg total) by mouth daily at bedtime, Disp: 30 tablet, Rfl: 0    Novofine Pen Needle 32G X 6 MM MISC, USE 3 (THREE) TIMES A DAY WITH MEALS, Disp: 100 each, Rfl: 2    pantoprazole (PROTONIX) 40 mg tablet, Take 1 tablet (40 mg total) by mouth daily, Disp: 90 tablet, Rfl: 3    polyethylene glycol (MIRALAX) 17 g packet, Take 17 g by mouth daily (Patient not taking: No sig reported), Disp: , Rfl: 0    pregabalin (LYRICA) 75 mg capsule, TAKE ONE CAPSULE EVERY DAY, Disp: 90 capsule, Rfl: 1    ticagrelor (BRILINTA) 90 MG, Take 1 tablet (90 mg total) by mouth every 12 (twelve) hours, Disp: 30 tablet, Rfl: 0    white petrolatum-mineral oil (EUCERIN,HYDROCERIN) cream, Apply topically 2 (two) times a day, Disp: 113 g, Rfl: 0     Plan discussed with Dr Michelle Nuñez noted agreement with assessment and plan  Please note:  Voice-recognition software may have been used in the preparation of this document  Occasional wrong word or "sound-alike" substitutions may have occurred due to the inherent limitations of voice recognition software  Interpretation should be guided by NEELAM Robles  8/23/2022  5:41 PM

## 2022-08-23 NOTE — ASSESSMENT & PLAN NOTE
· Developed acute blood loss anemia s/p VATS procedure  · Patient also has CKD contributing to chronic anemia  · S/p 5 units PRBCs while inpatient   · Hemoglobin 8 3 on 08/16 prior to discharge to rehab  · H&H 7 5/22 4 on 8/22/2022 at [facility]   · Transfuse for hemoglobin less than 7 although due to history of CAD with cardiac arrest and passed may require blood transfusion for hemoglobin less than 8  · Continue iron supplement  · Repeat CBC Thursday 8/25/2022

## 2022-08-23 NOTE — ASSESSMENT & PLAN NOTE
Lab Results   Component Value Date    HGBA1C 12 7 (H) 05/22/2022     · Last hemoglobin A1c 12 7 in May of 2022   · Home regimen:  Lantus 16 units q h s  and NovoLog 4 units t i d   · Insulin adjusted while inpatient   · Continue Lantus 30 units q h s     · Continue Humalog 5 units t i d  with meals   · Continue Lyrica for neuropathy  · Continue AC and HS blood sugar checks  · Hypoglycemic protocol

## 2022-08-23 NOTE — ASSESSMENT & PLAN NOTE
· Hx of Anxiety and Depression with Insomnia   · Continue Lexapro 10 mg daily for depression/anxiety  · Continue lorazepam 0 5 mg Q 8 hour p r n  for anxiety  · Continue mirtazapine 7 5 mg daily q h s  for sleep  · Continue hydroxyzine 25 mg q 6 hours p r n  for anxiety

## 2022-08-23 NOTE — ASSESSMENT & PLAN NOTE
· Heart rate controlled on exam   · Continue metoprolol succinate 50 mg b i d    · Continue amiodarone 100 mg daily   · Continue Eliquis 5 mg b i d  for anticoagulation

## 2022-08-23 NOTE — ASSESSMENT & PLAN NOTE
· Patient has been trach dependent since November 2021 due to cardiac arrest requiring prolonged ventilator  · Patient was decannulated at [facility] 12/11/2021   · Continue trach care with suctioning  · Continue trach collar O2 between 6-8 L with humidification

## 2022-08-23 NOTE — ASSESSMENT & PLAN NOTE
Lab Results   Component Value Date    EGFR 30 08/18/2022    EGFR 29 08/17/2022    EGFR 33 08/16/2022    CREATININE 1 85 (H) 08/18/2022    CREATININE 1 89 (H) 08/17/2022    CREATININE 1 68 (H) 08/16/2022     · Baseline creatinine 0 9-1 0   · Patient developed worsening kidney function while inpatient and her Bumex and Aldactone were discontinued as well as her losartan  · K 5 6 upon discharge received 1 dose of Kayexalate at [facility]   · Repeat BMP reviewed- K+within normal limits at 4 5   · BUN/creatinine 34/1 38 on 8/22/2022  Plan  · Repeat BMP on Thursday 8/25  · Avoid NSAIDs, avoid hypotension   · Renally dose medications as appropriate

## 2022-08-23 NOTE — ASSESSMENT & PLAN NOTE
· Consider resolved at this time  · Recurrent loculated right pleural effusion  · S/p right-sided chest tube insertion with removal 724-replaced on 07/29   · Transferred to [facility] for VATS procedure done on 8/6/2022   · Plan  · Continue levalbuterol an aperture opium nebulizer via trach t i d    · Encourage out of bed t i d  with meals   · Encourage incentive spirometer   · Respiratory therapy to follow   · Trach [facility]

## 2022-08-23 NOTE — ASSESSMENT & PLAN NOTE
· S/p chest tube insertion development of purulent discharge at chest tube site  · Cultures positive for MRSA  · Susceptible to vancomycin  · ID following   · PICC line placed 8/11/2022   ·  patient completed vancomycin on 8/20/2022  · Okay to remove PICC line  · Continue to monitor fever curve  · Patient denies any fever chills on exam   · Afebrile  · No leukocytosis on labs reviewed from Monday 8/22 at [facility]

## 2022-08-24 ENCOUNTER — APPOINTMENT (OUTPATIENT)
Dept: RADIOLOGY | Facility: HOSPITAL | Age: 57
DRG: 177 | End: 2022-08-24
Payer: COMMERCIAL

## 2022-08-24 ENCOUNTER — HOSPITAL ENCOUNTER (INPATIENT)
Facility: HOSPITAL | Age: 57
LOS: 6 days | Discharge: NURSING, REHAB OR LONG-TERM CARE | DRG: 177 | End: 2022-08-30
Attending: EMERGENCY MEDICINE | Admitting: INTERNAL MEDICINE
Payer: COMMERCIAL

## 2022-08-24 ENCOUNTER — APPOINTMENT (EMERGENCY)
Dept: CT IMAGING | Facility: HOSPITAL | Age: 57
DRG: 177 | End: 2022-08-24
Payer: COMMERCIAL

## 2022-08-24 ENCOUNTER — APPOINTMENT (INPATIENT)
Dept: CT IMAGING | Facility: HOSPITAL | Age: 57
DRG: 177 | End: 2022-08-24
Payer: COMMERCIAL

## 2022-08-24 DIAGNOSIS — R06.02 SHORTNESS OF BREATH: ICD-10-CM

## 2022-08-24 DIAGNOSIS — I10 HYPERTENSION, UNSPECIFIED TYPE: Chronic | ICD-10-CM

## 2022-08-24 DIAGNOSIS — I82.469 DEEP VEIN THROMBOSIS (DVT) OF CALF MUSCLE VEIN, UNSPECIFIED CHRONICITY, UNSPECIFIED LATERALITY (HCC): ICD-10-CM

## 2022-08-24 DIAGNOSIS — E11.65 TYPE 2 DIABETES MELLITUS WITH HYPERGLYCEMIA (HCC): ICD-10-CM

## 2022-08-24 DIAGNOSIS — R09.02 HYPOXIA: Primary | ICD-10-CM

## 2022-08-24 DIAGNOSIS — I82.462 DEEP VEIN THROMBOSIS (DVT) OF CALF MUSCLE VEIN OF LEFT LOWER EXTREMITY, UNSPECIFIED CHRONICITY (HCC): ICD-10-CM

## 2022-08-24 DIAGNOSIS — U07.1 COVID-19: ICD-10-CM

## 2022-08-24 PROBLEM — I50.32 CHRONIC HEART FAILURE WITH PRESERVED EJECTION FRACTION (HCC): Status: ACTIVE | Noted: 2022-07-30

## 2022-08-24 LAB
2HR DELTA HS TROPONIN: 1.9 NG/L
4HR DELTA HS TROPONIN: 3.9 NG/L
ALBUMIN SERPL BCP-MCNC: 3.1 G/DL (ref 3.5–5)
ALP SERPL-CCNC: 180 U/L (ref 34–104)
ALT SERPL W P-5'-P-CCNC: 18 U/L (ref 7–52)
ANION GAP SERPL CALCULATED.3IONS-SCNC: 9 MMOL/L (ref 4–13)
AST SERPL W P-5'-P-CCNC: 28 U/L (ref 13–39)
BASE EX.OXY STD BLDV CALC-SCNC: 69.5 % (ref 60–80)
BASE EX.OXY STD BLDV CALC-SCNC: 82.6 % (ref 60–80)
BASE EXCESS BLDV CALC-SCNC: -2 MMOL/L
BASE EXCESS BLDV CALC-SCNC: -3.8 MMOL/L
BASOPHILS # BLD AUTO: 0.08 THOUSANDS/ΜL (ref 0–0.1)
BASOPHILS NFR BLD AUTO: 1 % (ref 0–1)
BILIRUB SERPL-MCNC: 0.38 MG/DL (ref 0.2–1)
BUN SERPL-MCNC: 29 MG/DL (ref 5–25)
CALCIUM ALBUM COR SERPL-MCNC: 9.1 MG/DL (ref 8.3–10.1)
CALCIUM SERPL-MCNC: 8.4 MG/DL (ref 8.4–10.2)
CARDIAC TROPONIN I PNL SERPL HS: 15.1 NG/L
CARDIAC TROPONIN I PNL SERPL HS: 17 NG/L
CARDIAC TROPONIN I PNL SERPL HS: 19 NG/L
CHLORIDE SERPL-SCNC: 108 MMOL/L (ref 96–108)
CO2 SERPL-SCNC: 24 MMOL/L (ref 21–32)
CREAT SERPL-MCNC: 1.34 MG/DL (ref 0.6–1.3)
EOSINOPHIL # BLD AUTO: 0.23 THOUSAND/ΜL (ref 0–0.61)
EOSINOPHIL NFR BLD AUTO: 2 % (ref 0–6)
FLUAV RNA RESP QL NAA+PROBE: NEGATIVE
FLUBV RNA RESP QL NAA+PROBE: NEGATIVE
GFR SERPL CREATININE-BSD FRML MDRD: 44 ML/MIN/1.73SQ M
GLUCOSE SERPL-MCNC: 142 MG/DL (ref 65–140)
GLUCOSE SERPL-MCNC: 218 MG/DL (ref 65–140)
GLUCOSE SERPL-MCNC: 265 MG/DL (ref 65–140)
HCO3 BLDV-SCNC: 24.2 MMOL/L (ref 24–30)
HCO3 BLDV-SCNC: 24.4 MMOL/L (ref 24–30)
HCT VFR BLD AUTO: 31.2 % (ref 34.8–46.1)
HGB BLD-MCNC: 9.3 G/DL (ref 11.5–15.4)
IMM GRANULOCYTES # BLD AUTO: 0.07 THOUSAND/UL (ref 0–0.2)
IMM GRANULOCYTES NFR BLD AUTO: 1 % (ref 0–2)
LACTATE SERPL-SCNC: 1.8 MMOL/L (ref 0.5–2)
LYMPHOCYTES # BLD AUTO: 1.58 THOUSANDS/ΜL (ref 0.6–4.47)
LYMPHOCYTES NFR BLD AUTO: 15 % (ref 14–44)
MCH RBC QN AUTO: 25.2 PG (ref 26.8–34.3)
MCHC RBC AUTO-ENTMCNC: 29.8 G/DL (ref 31.4–37.4)
MCV RBC AUTO: 85 FL (ref 82–98)
MONOCYTES # BLD AUTO: 0.57 THOUSAND/ΜL (ref 0.17–1.22)
MONOCYTES NFR BLD AUTO: 6 % (ref 4–12)
NEUTROPHILS # BLD AUTO: 7.79 THOUSANDS/ΜL (ref 1.85–7.62)
NEUTS SEG NFR BLD AUTO: 76 % (ref 43–75)
NRBC BLD AUTO-RTO: 0 /100 WBCS
O2 CT BLDV-SCNC: 71.4 ML/DL
O2 CT BLDV-SCNC: 83.7 ML/DL
PCO2 BLDV: 49.9 MM HG (ref 42–50)
PCO2 BLDV: 59.9 MM HG (ref 42–50)
PH BLDV: 7.22 [PH] (ref 7.3–7.4)
PH BLDV: 7.31 [PH] (ref 7.3–7.4)
PLATELET # BLD AUTO: 574 THOUSANDS/UL (ref 149–390)
PMV BLD AUTO: 10 FL (ref 8.9–12.7)
PO2 BLDV: 44 MM HG (ref 35–45)
PO2 BLDV: 51.4 MM HG (ref 35–45)
POTASSIUM SERPL-SCNC: 4.3 MMOL/L (ref 3.5–5.3)
PROCALCITONIN SERPL-MCNC: 0.11 NG/ML
PROT SERPL-MCNC: 8.3 G/DL (ref 6.4–8.4)
RBC # BLD AUTO: 3.69 MILLION/UL (ref 3.81–5.12)
RSV RNA RESP QL NAA+PROBE: NEGATIVE
SARS-COV-2 RNA RESP QL NAA+PROBE: POSITIVE
SODIUM SERPL-SCNC: 141 MMOL/L (ref 135–147)
WBC # BLD AUTO: 10.32 THOUSAND/UL (ref 4.31–10.16)

## 2022-08-24 PROCEDURE — 94664 DEMO&/EVAL PT USE INHALER: CPT

## 2022-08-24 PROCEDURE — 71045 X-RAY EXAM CHEST 1 VIEW: CPT

## 2022-08-24 PROCEDURE — 99285 EMERGENCY DEPT VISIT HI MDM: CPT | Performed by: EMERGENCY MEDICINE

## 2022-08-24 PROCEDURE — 99285 EMERGENCY DEPT VISIT HI MDM: CPT

## 2022-08-24 PROCEDURE — 94760 N-INVAS EAR/PLS OXIMETRY 1: CPT

## 2022-08-24 PROCEDURE — 82805 BLOOD GASES W/O2 SATURATION: CPT

## 2022-08-24 PROCEDURE — 84484 ASSAY OF TROPONIN QUANT: CPT | Performed by: INTERNAL MEDICINE

## 2022-08-24 PROCEDURE — 94644 CONT INHLJ TX 1ST HOUR: CPT

## 2022-08-24 PROCEDURE — 94645 CONT INHLJ TX EACH ADDL HOUR: CPT

## 2022-08-24 PROCEDURE — 84145 PROCALCITONIN (PCT): CPT

## 2022-08-24 PROCEDURE — 85025 COMPLETE CBC W/AUTO DIFF WBC: CPT

## 2022-08-24 PROCEDURE — G1004 CDSM NDSC: HCPCS

## 2022-08-24 PROCEDURE — 94640 AIRWAY INHALATION TREATMENT: CPT

## 2022-08-24 PROCEDURE — 99223 1ST HOSP IP/OBS HIGH 75: CPT | Performed by: INTERNAL MEDICINE

## 2022-08-24 PROCEDURE — 82948 REAGENT STRIP/BLOOD GLUCOSE: CPT

## 2022-08-24 PROCEDURE — 93005 ELECTROCARDIOGRAM TRACING: CPT

## 2022-08-24 PROCEDURE — 84484 ASSAY OF TROPONIN QUANT: CPT

## 2022-08-24 PROCEDURE — 83036 HEMOGLOBIN GLYCOSYLATED A1C: CPT | Performed by: INTERNAL MEDICINE

## 2022-08-24 PROCEDURE — 0241U HB NFCT DS VIR RESP RNA 4 TRGT: CPT

## 2022-08-24 PROCEDURE — 71250 CT THORAX DX C-: CPT

## 2022-08-24 PROCEDURE — 87040 BLOOD CULTURE FOR BACTERIA: CPT

## 2022-08-24 PROCEDURE — 83605 ASSAY OF LACTIC ACID: CPT

## 2022-08-24 PROCEDURE — 80053 COMPREHEN METABOLIC PANEL: CPT

## 2022-08-24 PROCEDURE — 36415 COLL VENOUS BLD VENIPUNCTURE: CPT

## 2022-08-24 RX ORDER — METOPROLOL SUCCINATE 50 MG/1
50 TABLET, EXTENDED RELEASE ORAL EVERY 12 HOURS
Status: DISCONTINUED | OUTPATIENT
Start: 2022-08-24 | End: 2022-08-30 | Stop reason: HOSPADM

## 2022-08-24 RX ORDER — AMIODARONE HYDROCHLORIDE 200 MG/1
100 TABLET ORAL
Status: DISCONTINUED | OUTPATIENT
Start: 2022-08-25 | End: 2022-08-30 | Stop reason: HOSPADM

## 2022-08-24 RX ORDER — HYDROXYZINE HYDROCHLORIDE 25 MG/1
25 TABLET, FILM COATED ORAL EVERY 6 HOURS PRN
Status: DISCONTINUED | OUTPATIENT
Start: 2022-08-24 | End: 2022-08-30 | Stop reason: HOSPADM

## 2022-08-24 RX ORDER — MIRTAZAPINE 15 MG/1
7.5 TABLET, FILM COATED ORAL
Status: DISCONTINUED | OUTPATIENT
Start: 2022-08-24 | End: 2022-08-30 | Stop reason: HOSPADM

## 2022-08-24 RX ORDER — METHYLPREDNISOLONE SODIUM SUCCINATE 40 MG/ML
INJECTION, POWDER, LYOPHILIZED, FOR SOLUTION INTRAMUSCULAR; INTRAVENOUS ONCE
Status: CANCELLED | OUTPATIENT
Start: 2022-08-24 | End: 2022-08-24

## 2022-08-24 RX ORDER — LIDOCAINE 50 MG/G
2 PATCH TOPICAL DAILY
Status: DISCONTINUED | OUTPATIENT
Start: 2022-08-24 | End: 2022-08-30 | Stop reason: HOSPADM

## 2022-08-24 RX ORDER — LORAZEPAM 0.5 MG/1
0.5 TABLET ORAL EVERY 8 HOURS PRN
Status: DISCONTINUED | OUTPATIENT
Start: 2022-08-24 | End: 2022-08-30 | Stop reason: HOSPADM

## 2022-08-24 RX ORDER — ACETAMINOPHEN 325 MG/1
975 TABLET ORAL EVERY 8 HOURS SCHEDULED
Status: DISCONTINUED | OUTPATIENT
Start: 2022-08-24 | End: 2022-08-30 | Stop reason: HOSPADM

## 2022-08-24 RX ORDER — FUROSEMIDE 10 MG/ML
60 INJECTION INTRAMUSCULAR; INTRAVENOUS ONCE
Status: COMPLETED | OUTPATIENT
Start: 2022-08-24 | End: 2022-08-24

## 2022-08-24 RX ORDER — SODIUM CHLORIDE FOR INHALATION 0.9 %
12 VIAL, NEBULIZER (ML) INHALATION ONCE
Status: COMPLETED | OUTPATIENT
Start: 2022-08-24 | End: 2022-08-24

## 2022-08-24 RX ORDER — ALBUTEROL SULFATE 2.5 MG/3ML
SOLUTION RESPIRATORY (INHALATION)
Status: COMPLETED
Start: 2022-08-24 | End: 2022-08-24

## 2022-08-24 RX ORDER — PREGABALIN 75 MG/1
75 CAPSULE ORAL DAILY
Status: DISCONTINUED | OUTPATIENT
Start: 2022-08-24 | End: 2022-08-30 | Stop reason: HOSPADM

## 2022-08-24 RX ORDER — ALBUTEROL SULFATE 2.5 MG/3ML
2.5 SOLUTION RESPIRATORY (INHALATION) EVERY 4 HOURS PRN
Status: DISCONTINUED | OUTPATIENT
Start: 2022-08-24 | End: 2022-08-24

## 2022-08-24 RX ORDER — POLYETHYLENE GLYCOL 3350 17 G/17G
17 POWDER, FOR SOLUTION ORAL DAILY
Status: DISCONTINUED | OUTPATIENT
Start: 2022-08-24 | End: 2022-08-30 | Stop reason: HOSPADM

## 2022-08-24 RX ORDER — INSULIN LISPRO 100 [IU]/ML
5 INJECTION, SOLUTION INTRAVENOUS; SUBCUTANEOUS
Status: DISCONTINUED | OUTPATIENT
Start: 2022-08-24 | End: 2022-08-25

## 2022-08-24 RX ORDER — BENZONATATE 100 MG/1
100 CAPSULE ORAL 3 TIMES DAILY PRN
Status: DISCONTINUED | OUTPATIENT
Start: 2022-08-24 | End: 2022-08-30 | Stop reason: HOSPADM

## 2022-08-24 RX ORDER — INSULIN LISPRO 100 [IU]/ML
1-6 INJECTION, SOLUTION INTRAVENOUS; SUBCUTANEOUS
Status: DISCONTINUED | OUTPATIENT
Start: 2022-08-24 | End: 2022-08-30 | Stop reason: HOSPADM

## 2022-08-24 RX ORDER — INSULIN LISPRO 100 [IU]/ML
1-5 INJECTION, SOLUTION INTRAVENOUS; SUBCUTANEOUS
Status: DISCONTINUED | OUTPATIENT
Start: 2022-08-24 | End: 2022-08-30 | Stop reason: HOSPADM

## 2022-08-24 RX ORDER — ESCITALOPRAM OXALATE 10 MG/1
10 TABLET ORAL DAILY
Status: DISCONTINUED | OUTPATIENT
Start: 2022-08-24 | End: 2022-08-30 | Stop reason: HOSPADM

## 2022-08-24 RX ORDER — IPRATROPIUM BROMIDE AND ALBUTEROL SULFATE .5; 3 MG/3ML; MG/3ML
2 SOLUTION RESPIRATORY (INHALATION) ONCE
Status: COMPLETED | OUTPATIENT
Start: 2022-08-24 | End: 2022-08-24

## 2022-08-24 RX ORDER — FERROUS SULFATE 325(65) MG
325 TABLET ORAL
Status: DISCONTINUED | OUTPATIENT
Start: 2022-08-25 | End: 2022-08-30 | Stop reason: HOSPADM

## 2022-08-24 RX ORDER — INSULIN GLARGINE 100 [IU]/ML
30 INJECTION, SOLUTION SUBCUTANEOUS
Status: DISCONTINUED | OUTPATIENT
Start: 2022-08-24 | End: 2022-08-25

## 2022-08-24 RX ORDER — ALBUTEROL SULFATE 90 UG/1
2 AEROSOL, METERED RESPIRATORY (INHALATION) EVERY 4 HOURS PRN
Status: DISCONTINUED | OUTPATIENT
Start: 2022-08-24 | End: 2022-08-30 | Stop reason: HOSPADM

## 2022-08-24 RX ORDER — PANTOPRAZOLE SODIUM 40 MG/1
40 TABLET, DELAYED RELEASE ORAL
Status: DISCONTINUED | OUTPATIENT
Start: 2022-08-25 | End: 2022-08-30 | Stop reason: HOSPADM

## 2022-08-24 RX ORDER — LEVALBUTEROL 1.25 MG/.5ML
1.25 SOLUTION, CONCENTRATE RESPIRATORY (INHALATION)
Status: DISCONTINUED | OUTPATIENT
Start: 2022-08-24 | End: 2022-08-24

## 2022-08-24 RX ORDER — ATORVASTATIN CALCIUM 40 MG/1
40 TABLET, FILM COATED ORAL
Status: DISCONTINUED | OUTPATIENT
Start: 2022-08-24 | End: 2022-08-30 | Stop reason: HOSPADM

## 2022-08-24 RX ADMIN — IPRATROPIUM BROMIDE 1 MG: 0.5 SOLUTION RESPIRATORY (INHALATION) at 10:11

## 2022-08-24 RX ADMIN — ESCITALOPRAM OXALATE 10 MG: 10 TABLET ORAL at 18:54

## 2022-08-24 RX ADMIN — PREGABALIN 75 MG: 75 CAPSULE ORAL at 18:54

## 2022-08-24 RX ADMIN — ALBUTEROL SULFATE 10 MG: 2.5 SOLUTION RESPIRATORY (INHALATION) at 10:09

## 2022-08-24 RX ADMIN — ISODIUM CHLORIDE 12 ML: 0.03 SOLUTION RESPIRATORY (INHALATION) at 10:11

## 2022-08-24 RX ADMIN — FUROSEMIDE 60 MG: 10 INJECTION, SOLUTION INTRAMUSCULAR; INTRAVENOUS at 16:26

## 2022-08-24 RX ADMIN — LIDOCAINE 5% 1 PATCH: 700 PATCH TOPICAL at 16:32

## 2022-08-24 RX ADMIN — APIXABAN 5 MG: 5 TABLET, FILM COATED ORAL at 18:54

## 2022-08-24 RX ADMIN — METOPROLOL SUCCINATE 50 MG: 50 TABLET, EXTENDED RELEASE ORAL at 16:00

## 2022-08-24 RX ADMIN — INSULIN LISPRO 5 UNITS: 100 INJECTION, SOLUTION INTRAVENOUS; SUBCUTANEOUS at 19:29

## 2022-08-24 RX ADMIN — INSULIN LISPRO 1 UNITS: 100 INJECTION, SOLUTION INTRAVENOUS; SUBCUTANEOUS at 22:00

## 2022-08-24 RX ADMIN — INSULIN GLARGINE 30 UNITS: 100 INJECTION, SOLUTION SUBCUTANEOUS at 22:02

## 2022-08-24 RX ADMIN — TICAGRELOR 90 MG: 90 TABLET ORAL at 20:30

## 2022-08-24 RX ADMIN — ATORVASTATIN CALCIUM 40 MG: 40 TABLET, FILM COATED ORAL at 16:00

## 2022-08-24 RX ADMIN — ALBUTEROL SULFATE 10 MG: 2.5 SOLUTION RESPIRATORY (INHALATION) at 10:10

## 2022-08-24 RX ADMIN — MIRTAZAPINE 7.5 MG: 15 TABLET, FILM COATED ORAL at 21:59

## 2022-08-24 NOTE — ASSESSMENT & PLAN NOTE
· Trach placed in the context of cardiac arrest/prolonged ventilator dependent state November 2021  · Decannulated at [facility] 12/11/21  · Patient requires frequent tracheostomy changes and suctioning  · Per discussion with speech therapy,  video barium swallow was done for sense of food getting stuck   Appropriate for regular diet  · On trach collar O2 with humidification and tolerating well btw 6-8L Lives at home with her    Bathes and dresses her self  Uses walker for ambulation   Denies any tobacco use, illicit or ETOH use

## 2022-08-24 NOTE — ASSESSMENT & PLAN NOTE
Lab Results   Component Value Date    HGBA1C 12 7 (H) 05/22/2022       No results for input(s): POCGLU in the last 72 hours      Blood Sugar Average: Last 72 hrs:  · Has been uncontrolled based on most recent hemoglobin A1c results  · She will be continued on basal/bolus insulin per most recent discharge doses  · Accu-Cheks a c  HS with correctional scale insulin  · Will repeat hemoglobin A1c this admission

## 2022-08-24 NOTE — H&P
[address] [place] 1965, 65 y o  female MRN: [id number]  Unit/Bed#: ED-29 Encounter: [id number]  Primary Care Provider: Esther Galvan MD   Date and time admitted to hospital: 8/24/2022  9:34 AM    * Acute Respiratory insufficiency on chronic hypoxic respiratory failure  Assessment & Plan  · POA, presented from rehab with acute onset respiratory distress and hypoxia shortly after eating  · Symptoms currently resolved and patient feels back at baseline  · Recently discharged from the hospital to rehab on 8/18/22 following admission for right empyema with initial mariajose failed chest tube management and subsequently requiring VATS decortication on 8/6/22  · Has trach in place from prior admissions  Denies increased secretions  · Will continue supplemental trach collar O2 to maintain sats > 90%  · Continue nebs, respiratory protocol  Check swallow eval  · Possibly volume related in view of chest x-ray finding  Will receive Lasix IV 60 mg x 1 dose and observe response  · CT chest ordered in the ED and pending  Will follow on results    COVID-19  Assessment & Plan  · POA, initially tested positive for COVID on 5/19/22 and again today 8/24/22  · Doubt current symptoms related to COVID in view of sudden onset with almost immediate resolution  · Will monitor per mild COVID pathway    Tracheostomy in place [facility])  Assessment & Plan  · Also with history of subglottic stenosis  · Continue routine trach care  · Outpatient follow-up with pulmonology/ENT    Type 2 diabetes mellitus with hyperglycemia [facility])  Assessment & Plan  Lab Results   Component Value Date    HGBA1C 12 7 (H) 05/22/2022       No results for input(s): POCGLU in the last 72 hours      Blood Sugar Average: Last 72 hrs:  · Has been uncontrolled based on most recent hemoglobin A1c results  · She will be continued on basal/bolus insulin per most recent discharge doses  · Accu-Cheks a c  HS with correctional scale insulin  · Will repeat hemoglobin A1c this admission    CKD (chronic kidney disease) stage 3, GFR 30-59 ml/min [facility])  Assessment & Plan  Lab Results   Component Value Date    EGFR 44 08/24/2022    EGFR 30 08/18/2022    EGFR 29 08/17/2022    CREATININE 1 34 (H) 08/24/2022    CREATININE 1 85 (H) 08/18/2022    CREATININE 1 89 (H) 08/17/2022     · Baseline creatinine around 0 9-1 0 based on review of most recent admission  · Creatinine elevated at 1 34 today although improved from 1 85 on most recent check at the time of her discharge to rehab  · Will continue to monitor closely while on diuretics    Chronic heart failure with preserved ejection fraction [facility])  Assessment & Plan  Wt Readings from Last 3 Encounters:   08/23/22 80 8 kg (178 lb 3 2 oz)   08/19/22 80 8 kg (178 lb 3 2 oz)   08/18/22 81 1 kg (178 lb 12 7 oz)     · Patient had an admission to [facility] in July for CHF exacerbation  · At that time was maintained on Bumex 2 mg daily and Aldactone 100 mg daily  · Following discharge from [facility] after VATS decortication, it appears Bumex had been discontinued on 08/13 and Aldactone decreased to 50 mg daily  · Review of med list shows patient was not discharged on diuretics  · Chest x-ray today shows mild to moderate pulmonary vascular congestion and small right pleural effusion  · Will give Lasix 60 mg 1 dose now and observe for response  · Last echocardiogram was in February of 2022 which showed EF 47%, normal diastolic function  Will check an echocardiogram this admission  · Consider cardiology evaluation    History of Cardiac arrest [facility])  Assessment & Plan  · Continue home medications, monitor on telemetry        VTE Prophylaxis: Apixaban (Eliquis)  / sequential compression device     Code Status:  Full code    Anticipated Length of Stay:  Patient will be admitted on an Inpatient basis with an anticipated length of stay of  > 2 midnights     Justification for Hospital Stay:  Respiratory insufficiency    Chief Complaint:     Shortness of breath, sudden onset    History of Present Illness:  Selam Strong is a 65 y o  female who presents with sudden onset shortness of breath shortly after eating  Patient has recent history of right empyema and is status post VATS decortication on 8/6/22  She was discharged to rehab on 8/18/22 and completed antibiotic course on 8/20/22  She presented from rehab with shortness of breath  She is status post trach placement secondary to subglottic stenosis  Also has history of AFib and CHF  Per EMS, patient was hypoxic and in respiratory distress  This improved with nebs  In the ED, patient reports feeling back at baseline and denied shortness of breath at the time of my evaluation  She denies chest pain, no fever or chills  Incidentally tested positive for COVID 19 infection  She tested positive also in May  Review of Systems   Constitutional: Positive for fatigue  Negative for diaphoresis and fever  HENT: Negative for congestion, sore throat and trouble swallowing  Respiratory: Positive for shortness of breath  Negative for cough  Cardiovascular: Negative for chest pain, palpitations and leg swelling  Gastrointestinal: Negative for abdominal pain, nausea and vomiting  Genitourinary: Negative  Musculoskeletal: Negative  Neurological: Negative  All other systems reviewed and are negative  Past Medical History:   Diagnosis Date    Anxiety     Aortic aneurysm ([facility] [address] )     Arthritis     Depression     Diabetes mellitus (HCC)     Fibromyalgia     GERD (gastroesophageal reflux disease)     GERD (gastroesophageal reflux disease)     H/O cardiovascular stress test 09/2018    no ischemia  EF 70%   H/O echocardiogram 01/2019    EF 60%  Mild LVH  trivial effusion      Hyperlipidemia     Hypertension     Migraines     Psychiatric disorder     anxiety    Uncontrolled hypertension 2/25/2015    Last Assessment & Plan:  BP today above goal <140/90, apparently asymptomatic  Prior BP elevations were attributed to not taking medication  Consider increased medication if persistent on f/u  Past Surgical History:   Procedure Laterality Date    BACK SURGERY      Lumbar epidural steroid injection    CARDIAC CATHETERIZATION N/A 10/22/2021    Procedure: Cardiac pci;  Surgeon: López Webber MD;  [facility]; Service: Cardiology    CARDIAC CATHETERIZATION  10/22/2021    Procedure: Cardiac catheterization;  Surgeon: López Webber MD;  [facility]; Service: Cardiology    CARDIAC CATHETERIZATION Left 10/27/2021    Procedure: Cardiac Left Heart Cath;  Surgeon: Bella Galeana MD;  [facility]; Service: Cardiology    CARDIAC CATHETERIZATION N/A 10/27/2021    Procedure: Cardiac pci;  Surgeon: Bella Galeana MD;  [facility]; Service: Cardiology    CARDIAC CATHETERIZATION N/A 10/28/2021    Procedure: Cardiac pci;  Surgeon: Dariela Chambers DO;  [facility];   Service: Cardiology    CARPAL TUNNEL RELEASE Left      SECTION      CHOLECYSTECTOMY      COLONOSCOPY      incomplete    COLONOSCOPY      EYE SURGERY      HYSTERECTOMY      Total    IR CHEST TUBE PLACEMENT  2022    IR CHEST TUBE PLACEMENT  2022    IR CHEST TUBE PLACEMENT  2022    LUNG DECORTICATION Right 2022    Procedure: DECORTICATION LUNG;  Surgeon: Taylor Pugh MD;  Location: [facility] MAIN [facility];  Service: Thoracic    OVARIAN CYST REMOVAL      PEG W/TRACHEOSTOMY PLACEMENT N/A 2021    Procedure: TRACHEOSTOMY WITH INSERTION PEG TUBE;  Surgeon: Ortiz Fung DO;  Location: [facility] MAIN [facility];  Service: General    THORACOSCOPY VIDEO ASSISTED SURGERY (VATS) Right 2022    Procedure: THORACOSCOPY VIDEO ASSISTED SURGERY (VATS), RIGHT;  Surgeon: Taylor Pugh MD;  Location: [facility] MA[facility];  Service: Thoracic    TUBAL LIGATION      UPPER GASTROINTESTINAL ENDOSCOPY         Family History:  Family History   Problem Relation Age of Onset    Hypertension Mother     Arthritis Mother     Diabetes Father     Other Sister         renal cell carcinoma    Diabetes Other     Factor V Leiden deficiency Other        Home Meds:  all medications and allergies reviewed    Allergies: Allergies   Allergen Reactions    Metformin Diarrhea    Clonidine Rash     Patch          Marital Status: Legally      Social History     Substance and Sexual Activity   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  Social History     Tobacco Use   Smoking Status Former Smoker    Packs/day: 1 00    Years: 30 00    Pack years: 30 00    Types: Cigarettes   Smokeless Tobacco Never Used     Social History     Substance and Sexual Activity   Drug Use Yes    Types: Marijuana    Comment: medical; seldom use       Physical Exam:   Vitals:   Blood Pressure: 141/62 (08/24/22 1400)  Pulse: 66 (08/24/22 1400)  Temperature: 98 2 °F (36 8 °C) (08/24/22 0944)  Temp Source: Oral (08/24/22 0944)  Respirations: 18 (08/24/22 1400)  SpO2: 99 % (08/24/22 1400)    Physical Exam  Constitutional:       Appearance: She is ill-appearing  Comments: Chronically ill-appearing   HENT:      Head: Atraumatic  Mouth/Throat:      Mouth: Mucous membranes are moist    Eyes:      General: No scleral icterus  Right eye: No discharge  Left eye: No discharge  Cardiovascular:      Rate and Rhythm: Normal rate and regular rhythm  Comments: Trace lower extremity edema bilaterally  Pulmonary:      Breath sounds: Examination of the right-lower field reveals rales  Examination of the left-lower field reveals rales  Decreased breath sounds, rhonchi and rales present  Abdominal:      General: There is no distension  Palpations: Abdomen is soft  There is no mass  Tenderness: There is no abdominal tenderness     Musculoskeletal:      Cervical back: Neck supple  Right lower leg: Edema present  Left lower leg: Edema present  Neurological:      General: No focal deficit present  Mental Status: She is alert and oriented to person, place, and time  Mental status is at baseline  Lab Results: I have personally reviewed pertinent reports  Results from last 7 days   Lab Units 08/24/22  0951   WBC Thousand/uL 10 32*   HEMOGLOBIN g/dL 9 3*   HEMATOCRIT % 31 2*   PLATELETS Thousands/uL 574*   NEUTROS PCT % 76*   LYMPHS PCT % 15   MONOS PCT % 6   EOS PCT % 2     Results from last 7 days   Lab Units 08/24/22  0951   POTASSIUM mmol/L 4 3   CHLORIDE mmol/L 108   CO2 mmol/L 24   BUN mg/dL 29*   CREATININE mg/dL 1 34*   CALCIUM mg/dL 8 4   ALK PHOS U/L 180*   ALT U/L 18   AST U/L 28           Imaging: I have personally reviewed pertinent reports  XR chest 1 view portable    Result Date: 8/24/2022  Narrative: CHEST INDICATION:   sob  Patient has suspected COVID-19  COMPARISON:  Chest radiograph dated 8/10/2022  EXAM PERFORMED/VIEWS:  XR CHEST PORTABLE FINDINGS: Cardiomediastinal silhouette is stably enlarged  Mild to moderate pulmonary vascular congestion  Small right pleural effusion  No pneumothorax  Osseous structures appear within normal limits for patient age  Impression: Mild to moderate pulmonary vascular congestion  Small right pleural effusion  Workstation performed: [id number]     XR chest portable    Result Date: 8/8/2022  Narrative: CHEST INDICATION:   increased tracheal secretions  COMPARISON:  08/06/2022 EXAM PERFORMED/VIEWS:  XR CHEST PORTABLE Images: 2 FINDINGS: Cardiomediastinal silhouette appears enlarged  Endotracheal tube in satisfactory position with its tip 6 cm above the travis  2 right chest tubes, one at the lung bases and one terminating in the lung apex  Atelectasis at the right lung base  Increased parenchymal density at the left lung base may represent infiltrate, atelectasis or fluid  No pneumothorax  Osseous structures appear within normal limits for patient age  Impression: 1   2 right chest tubes  Atelectasis at the right lung base  No pneumothorax  2   Increased density in the retrocardiac region may represent infiltrate, atelectasis and/or fluid  Workstation performed: [id number]     XR chest portable    Result Date: 8/3/2022  Narrative: CHEST INDICATION:   empyema s/p chest tube insertion  COMPARISON:  Radiograph dated 731 2  CT dated 222  EXAM PERFORMED/VIEWS:  XR CHEST PORTABLE FINDINGS:  Pigtail pleural drainage catheter projecting at the basilar right hemithorax  Tracheostomy tip projects at the upper trachea at the level of the clavicular heads  Interval decrease in consolidations in the right midlung field and right base  Tiny left effusion with associated basilar atelectasis  Heart is enlarged but unchanged  Pulmonary vasculature is indistinct but not frankly cephalized  Bones are unremarkable  Impression: Improving right midlung field and bibasilar consolidation and effusion  Trace left pleural effusion persists  Stable position of right basilar pleural drainage catheter and tracheostomy  Workstation performed: [id number]     XR chest portable    Result Date: 8/1/2022  Narrative: CHEST INDICATION:   Respiratory failure  COMPARISON:  7/26/2022, CT chest 7/8/2022 EXAM PERFORMED/VIEWS:  XR CHEST PORTABLE FINDINGS:  Tracheostomy tube projects at the level of the clavicles  Cardiac silhouette is probably mildly enlarged, right heart border is not as sharply defined as before  Question mild pulmonary venous congestion versus artifactual accentuation by low lung volumes  Streaky right basilar opacity favored to represent atelectasis  Right pigtail thoracostomy tube  No pneumothorax  Small component of right pleural effusion suspected which may be partially loculated  Osseous structures appear within normal limits for patient age       Impression: Cardiomegaly with question of mild pulmonary venous congestion versus accentuation from hypoinflation  Streaky right base atelectasis  Small component of right pleural effusion suspected which may be partially loculated  Indwelling right thoracostomy tube without pneumothorax  Workstation performed: [id number]     XR chest portable    Result Date: 7/26/2022  Narrative: CHEST INDICATION:   dyspnea  COMPARISON:  Chest radiograph from 7/24/2022 and chest CT from 5/19/2022  EXAM PERFORMED/VIEWS:  XR CHEST PORTABLE FINDINGS:  Tracheostomy  Mild cardiomegaly  Right pigtail removed  Minimal right base atelectasis  Question trace right effusion  No pneumothorax  Osseous structures appear within normal limits for patient age  Impression: Right pigtail removed with mild right base atelectasis  Question trace right effusion  No pneumothorax  Workstation performed: [id number]     XR chest pa & lateral    Result Date: 8/10/2022  Narrative: CHEST INDICATION:   s/p R CT basilar  COMPARISON:  August 9, 2022  EXAM PERFORMED/VIEWS:  XR CHEST PA & LATERAL FINDINGS:  A tracheostomy tube remains in satisfactory position  A right-sided chest tube has been removed since yesterday  The heart is enlarged  Pulmonary vascular congestion is improved  A very small right apical pneumothorax is now present  There is blunting of the right lateral costophrenic sulcus  Subsegmental atelectasis is present in the right lower lobe  The left lung is clear    Osseous structures appear within normal limits for patient age  Impression: 1  Small right apical pneumothorax, developing since 8/9/2022  2   Small amount of right basilar pleural fluid or thickening, unchanged  3   Right lower lobe subsegmental atelectasis  4   Improvement of pulmonary vascular congestion  The study was marked in [facility] for immediate notification   Workstation performed: [id number]     XR chest pa & lateral    Result Date: 8/9/2022  Narrative: CHEST INDICATION:   CT on WS  COMPARISON:  8/7/2022 EXAM PERFORMED/VIEWS:  XR CHEST PA & LATERAL FINDINGS: Tracheostomy tube and 2 right thoracotomy tubes remain  Heart shadow is mildly enlarged but unchanged from prior exam  Mild vascular congestion, right pleural effusion, persistent decreased bibasilar atelectasis  No pneumothorax  Osseous structures appear within normal limits for patient age  Impression: No pneumothorax  No change right thoracotomy tube or tracheostomy tube Mild cardiomegaly  Mild vascular congestion Right pleural effusion Decreased bibasilar atelectasis Workstation performed: [id number]     CT chest wo contrast    Result Date: 8/2/2022  Narrative: CT CHEST WITHOUT IV CONTRAST INDICATION:   Empyema Empyema, blood draining from chest tube  COMPARISON:  CT chest 7/28/2022  TECHNIQUE: CT examination of the chest was performed without intravenous contrast   Axial, sagittal, and coronal 2D reformatted images were created from the source data and submitted for interpretation  Radiation dose length product (DLP) for this visit:  544 mGy-cm   This examination, like all CT scans performed in the [facility], was performed utilizing techniques to minimize radiation dose exposure, including the use of iterative reconstruction and automated exposure control  FINDINGS: LARGE AIRWAYS: Tracheostomy tube in place at the level of the thoracic inlet  The major airways are patent  LUNGS:  Atelectasis in the right lower lobe secondary to effusion, with improved aeration since prior study  Resolved inflammatory groundglass opacity in the left upper lobe, new small inflammatory groundglass opacities in the left lower lobe (series 3/70,75)  Mild background interstitial edema  PLEURA:  Interval placement of a pigtail pleural drainage catheter into the posterolateral right hemithorax along the right lower lobe, with decreased size of right basilar pleural effusion, now mild to moderate in volume   There is slightly increased density dependently (25-30 HU) compatible with a small amount of blood products  A loculated component of effusion more superiorly in the posterior medial right chest is unchanged (3/52)  Trace left pleural effusion, stable  HEART: Moderate cardiomegaly  Moderate coronary artery calcification  VESSELS: There is fusiform ectasia of the ascending thoracic aorta measuring up to 42 mm, unchanged  Recommendation is for follow-up low radiation dose chest CT in one year  Mildly dilated main pulmonary artery measuring 33 mm  MEDIASTINUM AND REECE:  Persistent mild mediastinal lymphadenopathy is overall unchanged (e g  a precarinal node measuring 12 mm in short axis on 3/50), likely reactive  CHEST WALL AND LOWER NECK:   Mildly improved appearance of infiltration overlying the right pectoralis major muscle (2/26) at site of previous catheter  Diffusely enlarged thyroid  VISUALIZED STRUCTURES IN THE UPPER ABDOMEN:  Cholecystectomy  Splenomegaly measuring 14 5 cm  Subcentimeter cyst or hemangioma in the right hepatic lobe unchanged from prior studies  BONES:  Mild multilevel spondylosis  Vertebral body height and alignment are maintained  Hemangioma in the T6 vertebral body  Old healed sternal fracture, bilateral rib fractures  Impression: 1  Decreased size of a right-sided pleural effusion and improved aeration of the right lower lobe post placement of a right pleural drainage catheter, with unchanged appearance of a loculated component of the effusion superomedially  There is slightly increased density of the effusion compatible with a small amount of blood products  2   Appearance of new inflammatory groundglass opacities in the left lower lobe  3   Mild background interstitial edema is unchanged  The study was marked in [employer] for significant notification    Workstation performed: [id number]     CT chest wo contrast    Result Date: 7/28/2022  Narrative: CT CHEST WITHOUT IV CONTRAST INDICATION: "Lung/mediastinal abscess, Empyema abscess? pus from chest tube site   " Per my review of the medical record, the patient has a history of COPD, MI and cardiac arrest resulting in tracheostomy  Presented approximately 10 days ago with spontaneous right pneumothorax, possibly secondary to coughing, for which a pigtail catheter was placed, now removed  COMPARISON:  Chest radiograph from 7/26/2022, chest CT from 5/19/2022, 2/23/2022, and 11/14/2021  TECHNIQUE: Chest CT without intravenous contrast   Axial, sagittal, coronal 2D reformats and coronal MIPS from source data  Radiation dose length product (DLP):  326 mGy-cm   Radiation dose exposure minimized using iterative reconstruction and automated exposure control  FINDINGS: LUNGS:  Moderate compressive atelectasis in the right lower lobe  Minimal new inflammatory groundglass opacity in the left upper lobe  Mild interstitial edema  Mild paraseptal emphysema  Scattered thin-walled cysts  AIRWAYS: No significant filling defects  PLEURA:  Moderate partially loculated right pleural effusion  A loculated component in the mid anterior right pleural space lies deep to the previous site of the pigtail catheter  Trace left effusion  No pneumothorax  HEART/GREAT VESSELS:  Moderate cardiomegaly  Stable mild ectasia of ascending aorta at 4 2 cm  Moderate coronary artery calcification indicating atherosclerotic heart disease  MEDIASTINUM AND REECE:  Tracheostomy  Persistent mild mediastinal lymphadenopathy, likely reactive  CHEST WALL AND LOWER NECK: Mild infiltration of the subcutaneous fat of the anterior chest wall and mild inflammation of the right pectoralis muscle at the site of previous pigtail catheter insertion (2/23-27)  UPPER ABDOMEN:  Cholecystectomy  OSSEOUS STRUCTURES: Healed bilateral rib fractures  Healed sternal fracture  Impression: Moderate partially loculated right pleural effusion  It cannot be determined if this is a bland effusion or empyema    A small right anterior loculated component lies deep to the site of previous pigtail insertion with no fluid collection in the chest wall  No lung abscess  Moderate compressive atelectasis in the right lower lobe due to the effusion  Superimposed pneumonia cannot be excluded in the appropriate clinical setting  Mild interstitial edema  Minimal new inflammatory groundglass opacity in the left upper lobe  Stable mild ectasia of the ascending aorta at 4 2 cm  Consider yearly follow-up with a chest CT with no contrast   Workstation performed: [id number]     X-ray chest 1 view    Result Date: 8/6/2022  Narrative: CHEST INDICATION:   s/p R VATS decortication  COMPARISON:  8/3/2022, CT chest 8/2/2022 EXAM PERFORMED/VIEWS:  XR CHEST 1 VIEW Images: 2 FINDINGS: Tracheostomy tube remains in place  Right pigtail thoracostomy tube has been removed and replaced with 2 larger tubes, 1 directed toward the apex the other toward the base  Cannot exclude a small focus of loculated pneumothorax along the medial right base  No apical pneumothorax  Cardiac silhouette is enlarged, stable  Bibasilar partial volume loss and pleural effusions  Question asymmetric pulmonary venous congestion on the right  Osseous structures appear within normal limits for patient age  Impression: Revision of right-sided chest tubes  Cannot exclude small focus of loculated pneumothorax medial right base  No apical pneumothorax  Bibasilar partial volume loss and pleural effusions  Question asymmetric pulmonary venous congestion on the right  Workstation performed: [id number]     IR chest tube placement    Result Date: 7/29/2022  Narrative: Ultrasound guided chest tube placement Clinical History: 81-year-old female with loculated right pleural effusion requiring chest tube placement  Conscious sedation time: 15MIN Technique: The patient was brought to the interventional radiology suite and identified verbally and by wristband  The patient was placed in the left lateral decubitus position on a stretcher  After review of the patient's prior imaging studies, the skin  over the right sided pleural fluid collection was examined with ultrasound  The skin was then prepped and draped in usual sterile fashion  Lidocaine was administered to the skin and a small skin incision was made  Under direct ultrasound guidance, a 19  gauge hollow bore needle was advanced into the fluid  A small amount of fluid was aspirated and sent for culture, sensitivity, and gram stain  A Amplatz wire was coiled within the fluid, and after tract dilatation, 12 Spanish all-purpose drainage catheter was inserted over the wire  The catheter was sutured in place, sterilely dressed, and  attached to an [facility]  Impression: Impression: 1  Successful placement of a 12 [place] catheter into loculated right pleural effusion under ultrasound guidance  Workstation performed: [id number]       EKG, Pathology, and Other Studies Reviewed on Admission:   · Normal sinus rhythm    ** Please Note: Dragon 360 Dictation voice to text software may have been used in the creation of this document   **

## 2022-08-24 NOTE — ASSESSMENT & PLAN NOTE
Lab Results   Component Value Date    EGFR 44 08/24/2022    EGFR 30 08/18/2022    EGFR 29 08/17/2022    CREATININE 1 34 (H) 08/24/2022    CREATININE 1 85 (H) 08/18/2022    CREATININE 1 89 (H) 08/17/2022     · Baseline creatinine around 0 9-1 0 based on review of most recent admission  · Creatinine elevated at 1 34 today although improved from 1 85 on most recent check at the time of her discharge to rehab  · Will continue to monitor closely while on diuretics

## 2022-08-24 NOTE — ED ATTENDING ATTESTATION
8/24/2022  IJohn DO, saw and evaluated the patient  I have discussed the patient with the resident/non-physician practitioner and agree with the resident's/non-physician practitioner's findings, Plan of Care, and MDM as documented in the resident's/non-physician practitioner's note, except where noted  All available labs and Radiology studies were reviewed  I was present for key portions of any procedure(s) performed by the resident/non-physician practitioner and I was immediately available to provide assistance  At this point I agree with the current assessment done in the Emergency Department  I have conducted an independent evaluation of this patient a history and physical is as follows:    Patient is a 64-year-old female with a history of subglottic stenosis status post tracheostomy who presents with respiratory distress  Respiratory distress started this morning and she was noted to be hypoxic  EMS states that she was saturating in the low 80s  They gave 10 mg of albuterol and 1 mg of ipratropium EN route to the hospital with some improvement  They also removed her inner cannula during transport  Patient describes shortness of breath and fatigue  On exam, patient is in mild respiratory distress with tachypnea  Heart is regular rate and rhythm  Good air movement bilaterally with expiratory wheeze  No rhonchi  No stridor  Abdomen is soft, nontender, nondistended  No lower extremity edema or calf tenderness  Inner cannula removed from trach and we were able to pass a suction catheter  It is also easy to bag the patient  Do not suspect an obstruction of the tracheostomy  Suspect pulmonary pathology  Patient was given bronchodilators with significant improvement  Therefore she was also given a dose of steroid  She is positive for COVID which may be the underlying etiology of respiratory distress  Will hospitalize for further workup and treatment      Portions of the above record have been created with voice recognition software  Occasional wrong word or "sound alike" substitutions may have occurred due to the inherent limitations of voice recognition software  Read the chart carefully and recognize, using context, where substitutions may have occurred        ED Course         Critical Care Time  Procedures

## 2022-08-24 NOTE — ASSESSMENT & PLAN NOTE
· POA, presented from rehab with acute onset respiratory distress and hypoxia shortly after eating  · Symptoms currently resolved and patient feels back at baseline  · Recently discharged from the hospital to rehab on 8/18/22 following admission for right empyema with initial mariajose failed chest tube management and subsequently requiring VATS decortication on 8/6/22  · Has trach in place from prior admissions  Denies increased secretions  · Will continue supplemental trach collar O2 to maintain sats > 90%  · Continue nebs, respiratory protocol  Check swallow eval  · Possibly volume related in view of chest x-ray finding  Will receive Lasix IV 60 mg x 1 dose and observe response  · CT chest ordered in the ED and pending    Will follow on results

## 2022-08-24 NOTE — ASSESSMENT & PLAN NOTE
Wt Readings from Last 3 Encounters:   08/23/22 80 8 kg (178 lb 3 2 oz)   08/19/22 80 8 kg (178 lb 3 2 oz)   08/18/22 81 1 kg (178 lb 12 7 oz)     · Patient had an admission to [facility] in July for CHF exacerbation  · At that time was maintained on Bumex 2 mg daily and Aldactone 100 mg daily  · Following discharge from [facility] after VATS decortication, it appears Bumex had been discontinued on 08/13 and Aldactone decreased to 50 mg daily  · Review of med list shows patient was not discharged on diuretics  · Chest x-ray today shows mild to moderate pulmonary vascular congestion and small right pleural effusion  · Will give Lasix 60 mg 1 dose now and observe for response  · Last echocardiogram was in February of 2022 which showed EF 34%, normal diastolic function    Will check an echocardiogram this admission  · Consider cardiology evaluation

## 2022-08-24 NOTE — ASSESSMENT & PLAN NOTE
· POA, initially tested positive for COVID on 5/19/22 and again today 8/24/22  · Doubt current symptoms related to COVID in view of sudden onset with almost immediate resolution  · Will monitor per mild COVID pathway

## 2022-08-24 NOTE — ED PROVIDER NOTES
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, CHF, type 2 diabetes presenting for evaluation of hypoxia  Of note patient has had a recent extended hospitalization over multiple locations for hypoxia  During her stay she developed empyema requiring VATS procedure  Her stay was further complicated by repeated chest tube insertion and removal   A during her stay patient tested positive for MRSA, finished course of vancomycin  Patient is brought in via EMS today for evaluation of new hypoxia  Upon arrival patient was in marked distress with low SpO2  Patient had albuterol nebulization begun with all minor resolution of hypoxia and reduced work of breathing  Prior to Admission Medications   Prescriptions Last Dose Informant Patient Reported? Taking? Blood Pressure Monitoring (Sphygmomanometer) MISC   No No   Sig: Use 2 (two) times a day   Patient not taking: No sig reported   Insulin Pen Needle 31G X 6 MM MISC  Self Yes No   Si Device by Does not apply route 4 (four) times a day   LORazepam (Ativan) 1 mg tablet   No No   Sig: Take 0 5 tablets (0 5 mg total) by mouth every 8 (eight) hours as needed for anxiety or sleep   Lancets MISC  Self Yes No   Sig: Use 1 Device 4 (four) times a day   Novofine Pen Needle 32G X 6 MM MISC   No No   Sig: USE 3 (THREE) TIMES A DAY WITH MEALS   acetaminophen (TYLENOL) 325 mg tablet   No No   Sig: Take 3 tablets (975 mg total) by mouth every 8 (eight) hours   albuterol (2 5 mg/3 mL) 0 083 % nebulizer solution  Self No No   Sig: Take 3 mL (2 5 mg total) by nebulization every 4 (four) hours as needed for wheezing or shortness of breath   amiodarone 100 mg tablet   No No   Sig: TAKE 1 TABLET BY MOUTH DAILY WITH BREAKFAST     apixaban (ELIQUIS) 5 mg  Self No No   Sig: Take 1 tablet (5 mg total) by mouth 2 (two) times a day   atorvastatin (LIPITOR) 40 mg tablet   No No   Sig: Take 1 tablet (40 mg total) by mouth daily with dinner   benzonatate (TESSALON PERLES) 100 mg capsule   No No   Sig: Take 1 capsule (100 mg total) by mouth 3 (three) times a day as needed for cough   escitalopram (LEXAPRO) 10 mg tablet   No No   Sig: TAKE 1 TABLET BY MOUTH EVERY DAY   ferrous sulfate 325 (65 Fe) mg tablet   No No   Sig: Take 1 tablet (325 mg total) by mouth daily with breakfast   hydrOXYzine HCL (ATARAX) 25 mg tablet   No No   Sig: Take 1 tablet (25 mg total) by mouth every 6 (six) hours as needed for anxiety   insulin glargine (LANTUS) 100 units/mL subcutaneous injection   No No   Sig: Inject 30 Units under the skin daily at bedtime   insulin lispro (HumaLOG KwikPen) 100 units/mL injection pen   No No   Sig: Inject 5 Units under the skin 3 (three) times a day with meals   ipratropium (ATROVENT) 0 02 % nebulizer solution  Self No No   Sig: Take 2 5 mL (0 5 mg total) by nebulization 3 (three) times a day   levalbuterol (XOPENEX) 1 25 mg/0 5 mL nebulizer solution  Self No No   Sig: Take 0 5 mL (1 25 mg total) by nebulization 3 (three) times a day   lidocaine (LIDODERM) 5 %   No No   Sig: Apply 2 patches topically daily Remove & Discard patch within 12 hours or as directed by MD   metoprolol succinate (TOPROL-XL) 50 mg 24 hr tablet   No No   Sig: Take 1 tablet (50 mg total) by mouth every 12 (twelve) hours   mirtazapine (REMERON) 7 5 MG tablet   No No   Sig: Take 1 tablet (7 5 mg total) by mouth daily at bedtime   pantoprazole (PROTONIX) 40 mg tablet   No No   Sig: Take 1 tablet (40 mg total) by mouth daily   polyethylene glycol (MIRALAX) 17 g packet  Self No No   Sig: Take 17 g by mouth daily   Patient not taking: No sig reported   pregabalin (LYRICA) 75 mg capsule  Self No No   Sig: TAKE ONE CAPSULE EVERY DAY   ticagrelor (BRILINTA) 90 MG   No No   Sig: Take 1 tablet (90 mg total) by mouth every 12 (twelve) hours   white petrolatum-mineral oil (EUCERIN,HYDROCERIN) cream   No No   Sig: Apply topically 2 (two) times a day      Facility-Administered Medications: None       Past Medical History:   Diagnosis Date    Anxiety     Aortic aneurysm (HCC)     Arthritis     Depression     Diabetes mellitus (HCC)     Fibromyalgia     GERD (gastroesophageal reflux disease)     GERD (gastroesophageal reflux disease)     H/O cardiovascular stress test 2018    no ischemia  EF 70%   H/O echocardiogram 2019    EF 60%  Mild LVH  trivial effusion   Hyperlipidemia     Hypertension     Migraines     Psychiatric disorder     anxiety    Uncontrolled hypertension 2015    Last Assessment & Plan:  BP today above goal <140/90, apparently asymptomatic  Prior BP elevations were attributed to not taking medication  Consider increased medication if persistent on f/u  Past Surgical History:   Procedure Laterality Date    BACK SURGERY      Lumbar epidural steroid injection    CARDIAC CATHETERIZATION N/A 10/22/2021    Procedure: Cardiac pci;  Surgeon: Pratibha Norris MD;  [facility]; Service: Cardiology    CARDIAC CATHETERIZATION  10/22/2021    Procedure: Cardiac catheterization;  Surgeon: Pratibha Norris MD;  [facility]; Service: Cardiology    CARDIAC CATHETERIZATION Left 10/27/2021    Procedure: Cardiac Left Heart Cath;  Surgeon: Christiane Ybarra MD;  [facility]; Service: Cardiology    CARDIAC CATHETERIZATION N/A 10/27/2021    Procedure: Cardiac pci;  Surgeon: Christiane Ybarra MD;  [facility]; Service: Cardiology    CARDIAC CATHETERIZATION N/A 10/28/2021    Procedure: Cardiac pci;  Surgeon: Alex Wiggins DO;  [facility];   Service: Cardiology    CARPAL TUNNEL RELEASE Left      SECTION      CHOLECYSTECTOMY      COLONOSCOPY      incomplete    COLONOSCOPY      EYE SURGERY      HYSTERECTOMY      Total    IR CHEST TUBE PLACEMENT  7/20/2022    IR CHEST TUBE PLACEMENT  7/22/2022    IR CHEST TUBE PLACEMENT  7/29/2022    LUNG DECORTICATION Right 8/6/2022    Procedure: DECORTICATION LUNG;  Surgeon: Luiz Mccord MD;  [facility];  Service: Thoracic    OVARIAN CYST REMOVAL      PEG W/TRACHEOSTOMY PLACEMENT N/A 11/12/2021    Procedure: TRACHEOSTOMY WITH INSERTION PEG TUBE;  Surgeon: Baldemar Fnug DO;  [facility];  Service: General    THORACOSCOPY VIDEO ASSISTED SURGERY (VATS) Right 8/6/2022    Procedure: THORACOSCOPY VIDEO ASSISTED SURGERY (VATS), RIGHT;  Surgeon: Luiz Mccord MD;  [facility];  Service: Thoracic    TUBAL LIGATION      UPPER GASTROINTESTINAL ENDOSCOPY         Family History   Problem Relation Age of Onset    Hypertension Mother    [place] Arthritis Mother     Diabetes Father     Other Sister         renal cell carcinoma    Diabetes Other     Factor V Leiden deficiency Other      I have reviewed and agree with the history as documented  E-Cigarette/Vaping    E-Cigarette Use Never User      E-Cigarette/Vaping Substances    Nicotine No     THC No     CBD No     Flavoring No     Other No     Unknown No      Social History     Tobacco Use    Smoking status: Former Smoker     Packs/day: 1 00     Years: 30 00     Pack years: 30 00     Types: Cigarettes    Smokeless tobacco: Never Used   Vaping Use    Vaping Use: Never used   Substance Use Topics    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   Drug use: Yes     Types: Marijuana     Comment: medical; seldom use        Review of Systems   Constitutional: Positive for fatigue  HENT: Negative  Eyes: Negative  Respiratory: Positive for shortness of breath  Cardiovascular: Negative  Gastrointestinal: Negative  Endocrine: Negative  Genitourinary: Negative  Musculoskeletal: Negative  Skin: Negative  Allergic/Immunologic: Negative  Neurological: Negative  Hematological: Negative  Psychiatric/Behavioral: Negative  All other systems reviewed and are negative  Physical Exam  ED Triage Vitals [08/24/22 0944]   Temperature Pulse Respirations Blood Pressure SpO2   98 2 °F (36 8 °C) 86 18 (!) 206/93 99 %      Temp Source Heart Rate Source Patient Position - Orthostatic VS BP Location FiO2 (%)   Oral Monitor Sitting Right arm 100      Pain Score       --             Orthostatic Vital Signs  Vitals:    08/24/22 1200 08/24/22 1230 08/24/22 1300 08/24/22 1400   BP:  120/58 140/62 141/62   Pulse: 68 66 66 66   Patient Position - Orthostatic VS:    Sitting       Physical Exam  Vitals and nursing note reviewed  Constitutional:       General: She is in acute distress  Appearance: Normal appearance  She is ill-appearing  She is not toxic-appearing or diaphoretic  HENT:      Head: Normocephalic and atraumatic  Comments: Trach in place  Eyes:      General: No scleral icterus  Right eye: No discharge  Left eye: No discharge  Extraocular Movements: Extraocular movements intact  Conjunctiva/sclera: Conjunctivae normal       Pupils: Pupils are equal, round, and reactive to light  Cardiovascular:      Rate and Rhythm: Normal rate  Pulses: Normal pulses  Heart sounds: Normal heart sounds  No murmur heard  No friction rub  No gallop  Pulmonary:      Effort: Respiratory distress present  Breath sounds: No stridor  Rhonchi present  No wheezing or rales  Abdominal:      General: Abdomen is flat  Bowel sounds are normal  There is no distension  Palpations: Abdomen is soft  Tenderness: There is no abdominal tenderness  There is no guarding or rebound  Musculoskeletal:         General: No swelling  Normal range of motion  Cervical back: Normal range of motion  No rigidity  Skin:     General: Skin is warm and dry  Capillary Refill: Capillary refill takes less than 2 seconds        Coloration: Skin is not jaundiced  Findings: No bruising or lesion  Neurological:      General: No focal deficit present  Mental Status: She is alert and oriented to person, place, and time  Mental status is at baseline  Psychiatric:         Mood and Affect: Mood normal          Behavior: Behavior normal          Thought Content: Thought content normal          Judgment: Judgment normal          ED Medications  Medications   ipratropium-albuterol (FOR EMS ONLY) (DUO-NEB) 0 5-2 5 mg/3 mL inhalation solution 6 mL (0 mL Does not apply Given to EMS 8/24/22 0950)   albuterol inhalation solution 10 mg (10 mg Nebulization Given 8/24/22 1010)   ipratropium (ATROVENT) 0 02 % inhalation solution 1 mg (1 mg Nebulization Given 8/24/22 1011)   sodium chloride 0 9 % inhalation solution 12 mL (12 mL Nebulization Given 8/24/22 1011)   albuterol (2 5 mg/3 mL) 0 083 % inhalation solution **ADS Override Pull** (10 mg  Given 8/24/22 1009)       Diagnostic Studies  Results Reviewed     Procedure Component Value Units Date/Time    HS Troponin I 2hr [[id number]]  (Normal) Collected: 08/24/22 1153    Lab Status: Final result Specimen: Blood from Arm, Right Updated: 08/24/22 1240     hs TnI 2hr 17 ng/L      Delta 2hr hsTnI 1 9 ng/L     FLU/RSV/COVID - if FLU/RSV clinically relevant [[id number]]  (Abnormal) Collected: 08/24/22 1126    Lab Status: Final result Specimen: Nares from Nose Updated: 08/24/22 1215     SARS-CoV-2 Positive     INFLUENZA A PCR Negative     INFLUENZA B PCR Negative     RSV PCR Negative    Narrative:      FOR PEDIATRIC PATIENTS - copy/paste COVID Guidelines URL to browser: [email or web address]    SARS-CoV-2 assay is a Nucleic Acid Amplification assay intended for the  qualitative detection of nucleic acid from SARS-CoV-2 in nasopharyngeal  swabs  Results are for the presumptive identification of SARS-CoV-2 RNA      Positive results are indicative of infection with SARS-CoV-2, the virus  causing COVID-19, but do not rule out bacterial infection or co-infection  with other viruses  Laboratories within the [place] and its  territories are required to report all positive results to the appropriate  public health authorities  Negative results do not preclude SARS-CoV-2  infection and should not be used as the sole basis for treatment or other  patient management decisions  Negative results must be combined with  clinical observations, patient history, and epidemiological information  This test has not been FDA cleared or approved  This test has been authorized by FDA under an Emergency Use Authorization  (EUA)  This test is only authorized for the duration of time the  declaration that circumstances exist justifying the authorization of the  emergency use of an in vitro diagnostic tests for detection of SARS-CoV-2  virus and/or diagnosis of COVID-19 infection under section 564(b)(1) of  the Act, 21 U  S C  437WUC-0(M)(8), unless the authorization is terminated  or revoked sooner  The test has been validated but independent review by FDA  and CLIA is pending  Test performed using [employer] Gene[employer]pert: This RT-PCR assay targets N2,  a region unique to SARS-CoV-2  A conserved region in the E-gene was chosen  for pan-Sarbecovirus detection which includes SARS-CoV-2      Blood gas, venous [[id number]]  (Abnormal) Collected: 08/24/22 1126    Lab Status: Final result Specimen: Blood from Arm, Right Updated: 08/24/22 1210     pH, Daquan 7 307     pCO2, Daquan 49 9 mm Hg      pO2, Daquan 51 4 mm Hg      HCO3, Daquan 24 4 mmol/L      Base Excess, Daquan -2 0 mmol/L      O2 Content, Daquan 83 7 ml/dL      O2 HGB, VENOUS 82 6 %     HS Troponin I 4hr [[id number]]     Lab Status: No result Specimen: Blood     Comprehensive metabolic panel [[id number]]  (Abnormal) Collected: 08/24/22 0951    Lab Status: Final result Specimen: Blood from Arm, Right Updated: 08/24/22 1150     Sodium 141 mmol/L      Potassium 4 3 mmol/L Chloride 108 mmol/L      CO2 24 mmol/L      ANION GAP 9 mmol/L      BUN 29 mg/dL      Creatinine 1 34 mg/dL      Glucose 265 mg/dL      Calcium 8 4 mg/dL      Corrected Calcium 9 1 mg/dL      AST 28 U/L      ALT 18 U/L      Alkaline Phosphatase 180 U/L      Total Protein 8 3 g/dL      Albumin 3 1 g/dL      Total Bilirubin 0 38 mg/dL      eGFR 44 ml/min/1 73sq m     Narrative:      [place] guidelines for Chronic Kidney Disease (CKD):     Stage 1 with normal or high GFR (GFR > 90 mL/min/1 73 square meters)    Stage 2 Mild CKD (GFR = 60-89 mL/min/1 73 square meters)    Stage 3A Moderate CKD (GFR = 45-59 mL/min/1 73 square meters)    Stage 3B Moderate CKD (GFR = 30-44 mL/min/1 73 square meters)    Stage 4 Severe CKD (GFR = 15-29 mL/min/1 73 square meters)    Stage 5 End Stage CKD (GFR <15 mL/min/1 73 square meters)  Note: GFR calculation is accurate only with a steady state creatinine    Procalcitonin [[id number]]  (Normal) Collected: 08/24/22 0951    Lab Status: Final result Specimen: Blood from Arm, Right Updated: 08/24/22 1139     Procalcitonin 0 11 ng/ml     HS Troponin 0hr (reflex protocol) [[id number]]  (Normal) Collected: 08/24/22 0951    Lab Status: Final result Specimen: Blood from Arm, Right Updated: 08/24/22 1139     hs TnI 0hr 15 1 ng/L     Lactic acid, plasma [[id number]]  (Normal) Collected: 08/24/22 0951    Lab Status: Final result Specimen: Blood from Arm, Right Updated: 08/24/22 1059     LACTIC ACID 1 8 mmol/L     Narrative:      Result may be elevated if tourniquet was used during collection      CBC and differential [[id number]]  (Abnormal) Collected: 08/24/22 0951    Lab Status: Final result Specimen: Blood from Arm, Right Updated: 08/24/22 1049     WBC 10 32 Thousand/uL      RBC 3 69 Million/uL      Hemoglobin 9 3 g/dL      Hematocrit 31 2 %      MCV 85 fL      MCH 25 2 pg      MCHC 29 8 g/dL      MPV 10 0 fL      Platelets 897 Thousands/uL      nRBC 0 /100 WBCs Neutrophils Relative 76 %      Immat GRANS % 1 %      Lymphocytes Relative 15 %      Monocytes Relative 6 %      Eosinophils Relative 2 %      Basophils Relative 1 %      Neutrophils Absolute 7 79 Thousands/µL      Immature Grans Absolute 0 07 Thousand/uL      Lymphocytes Absolute 1 58 Thousands/µL      Monocytes Absolute 0 57 Thousand/µL      Eosinophils Absolute 0 23 Thousand/µL      Basophils Absolute 0 08 Thousands/µL     Blood gas, venous [[id number]]  (Abnormal) Collected: 08/24/22 0951    Lab Status: Final result Specimen: Blood from Arm, Right Updated: 08/24/22 1018     pH, Daquan 7 224     pCO2, Daquan 59 9 mm Hg      pO2, Daquan 44 0 mm Hg      HCO3, Daquan 24 2 mmol/L      Base Excess, Daquan -3 8 mmol/L      O2 Content, Daquan 71 4 ml/dL      O2 HGB, VENOUS 69 5 %     Blood culture #1 [[id number]] Collected: 08/24/22 0951    Lab Status: In process Specimen: Blood from Arm, Right Updated: 08/24/22 1012    Blood culture #2 [[id number]] Collected: 08/24/22 0951    Lab Status: In process Specimen: Blood from Arm, Left Updated: 08/24/22 1011                 XR chest 1 view portable   Final Result by Joanna Conde MD (08/24 1149)      Mild to moderate pulmonary vascular congestion  Small right pleural effusion                    Workstation performed: [id number]         CT chest without contrast    (Results Pending)         Procedures  ECG 12 Lead Documentation Only    Date/Time: 8/24/2022 2:21 PM  Performed by: Argenis Schumacher DO  Authorized by: Argenis Schumacher DO     Indications / Diagnosis:  Shortness of breath  Patient location:  ED  Previous ECG:     Previous ECG:  Compared to current    Similarity:  No change  Interpretation:     Interpretation: normal    Rate:     ECG rate:  90    ECG rate assessment: normal    Rhythm:     Rhythm: sinus rhythm    QRS:     QRS axis:  Left  Conduction:     Conduction: abnormal      Abnormal conduction: incomplete RBBB and LAFB    ST segments:     ST segments:  Normal  T waves:     T waves: normal            ED Course  ED Course as of 08/24/22 1431   Wed Aug 24, 2022   1210 Patient presenting hypoxic or with trach in place  began patient on 1 hour long nebulization, orders placed   1105 WBC(!): 10 32   1105 LACTIC ACID: 1 8   1106 pH 7 224, suspect some chronicity  Will repeat gas, obtain CT, admit                             SBIRT 22yo+    Flowsheet Row Most Recent Value   SBIRT (25 yo +)    In order to provide better care to our patients, we are screening all of our patients for alcohol and drug use  Would it be okay to ask you these screening questions? No Filed at: 08/24/2022 1106                MDM  Number of Diagnoses or Management Options  COVID-19: new and requires workup  Hypoxia: new and requires workup  Diagnosis management comments: -patient presenting to emergency department in acute distress for evaluation of hypoxia  -emergency department patient stabilize  Received albuterol nebulization with a marked reduction in work of breathing  Patient re-evaluated multiple times so with a significant improvement in work of breathing, SpO2  -patient initially a mildly acidemic    Repeat gas showed mild improvement in blood pH  -patient tested positive for COVID-19, she did receive her 1st COVID-19 dose on August 17  -initial order was placed for CT scan though because of a delay and imaging attainment decision was made to admit patient   -orders placed to admit patient       Amount and/or Complexity of Data Reviewed  Clinical lab tests: ordered and reviewed  Tests in the radiology section of CPT®: ordered and reviewed  Tests in the medicine section of CPT®: ordered and reviewed  Decide to obtain previous medical records or to obtain history from someone other than the patient: yes  Review and summarize past medical records: yes  Discuss the patient with other providers: yes  Independent visualization of images, tracings, or specimens: yes        Disposition  Final diagnoses: Hypoxia   COVID-19     Time reflects when diagnosis was documented in both MDM as applicable and the Disposition within this note     Time User Action Codes Description Comment    8/24/2022  2:08 PM Orlando Marcano [R09 02] Hypoxia     8/24/2022  2:08 PM Orlando Marcano [U07 1] COVID-19       ED Disposition     ED Disposition   Admit    Condition   Stable    Date/Time   Wed Aug 24, 2022  2:08 PM    Comment   Case was discussed with jacek and the patient's admission status was agreed to be Admission Status: inpatient status to the service of Dr Monique Chakraborty   Follow-up Information    None         Patient's Medications   Discharge Prescriptions    No medications on file     No discharge procedures on file  PDMP Review       Value Time User    PDMP Reviewed  Yes 6/28/2022  1:39 AM Karli Rodríguez           ED Provider  Attending physically available and evaluated Kelli Red I managed the patient along with the ED Attending      Electronically Signed by         Marco Reis DO  08/24/22 4290

## 2022-08-25 ENCOUNTER — HOSPITAL ENCOUNTER (INPATIENT)
Dept: VASCULAR ULTRASOUND | Facility: HOSPITAL | Age: 57
Discharge: HOME | DRG: 177 | End: 2022-08-25
Payer: COMMERCIAL

## 2022-08-25 LAB
ANION GAP SERPL CALCULATED.3IONS-SCNC: 5 MMOL/L (ref 4–13)
ATRIAL RATE: 90 BPM
BUN SERPL-MCNC: 30 MG/DL (ref 5–25)
CALCIUM SERPL-MCNC: 8.3 MG/DL (ref 8.4–10.2)
CHLORIDE SERPL-SCNC: 110 MMOL/L (ref 96–108)
CO2 SERPL-SCNC: 28 MMOL/L (ref 21–32)
CREAT SERPL-MCNC: 1.18 MG/DL (ref 0.6–1.3)
CRP SERPL QL: 31 MG/L
CRP SERPL QL: 36.1 MG/L
D DIMER PPP FEU-MCNC: 6.23 UG/ML FEU
ERYTHROCYTE [DISTWIDTH] IN BLOOD BY AUTOMATED COUNT: 18.6 % (ref 11.6–15.1)
ERYTHROCYTE [DISTWIDTH] IN BLOOD BY AUTOMATED COUNT: 19 % (ref 11.6–15.1)
EST. AVERAGE GLUCOSE BLD GHB EST-MCNC: 186 MG/DL
GFR SERPL CREATININE-BSD FRML MDRD: 51 ML/MIN/1.73SQ M
GLUCOSE SERPL-MCNC: 219 MG/DL (ref 65–140)
GLUCOSE SERPL-MCNC: 296 MG/DL (ref 65–140)
GLUCOSE SERPL-MCNC: 343 MG/DL (ref 65–140)
GLUCOSE SERPL-MCNC: 64 MG/DL (ref 65–140)
GLUCOSE SERPL-MCNC: 67 MG/DL (ref 65–140)
HBA1C MFR BLD: 8.1 %
HCT VFR BLD AUTO: 25.7 % (ref 34.8–46.1)
HCT VFR BLD AUTO: 30.8 % (ref 34.8–46.1)
HGB BLD-MCNC: 7.8 G/DL (ref 11.5–15.4)
HGB BLD-MCNC: 8.7 G/DL (ref 11.5–15.4)
MCH RBC QN AUTO: 25.3 PG (ref 26.8–34.3)
MCH RBC QN AUTO: 26 PG (ref 26.8–34.3)
MCHC RBC AUTO-ENTMCNC: 28.2 G/DL (ref 31.4–37.4)
MCHC RBC AUTO-ENTMCNC: 30.4 G/DL (ref 31.4–37.4)
MCV RBC AUTO: 83 FL (ref 82–98)
MCV RBC AUTO: 92 FL (ref 82–98)
P AXIS: 24 DEGREES
PLATELET # BLD AUTO: 431 THOUSANDS/UL (ref 149–390)
PLATELET # BLD AUTO: 436 THOUSANDS/UL (ref 149–390)
PMV BLD AUTO: 10.1 FL (ref 8.9–12.7)
PMV BLD AUTO: 9.9 FL (ref 8.9–12.7)
POTASSIUM SERPL-SCNC: 3.8 MMOL/L (ref 3.5–5.3)
PR INTERVAL: 154 MS
PROCALCITONIN SERPL-MCNC: 1 NG/ML
QRS AXIS: -80 DEGREES
QRSD INTERVAL: 150 MS
QT INTERVAL: 468 MS
QTC INTERVAL: 572 MS
RBC # BLD AUTO: 3.08 MILLION/UL (ref 3.81–5.12)
RBC # BLD AUTO: 3.35 MILLION/UL (ref 3.81–5.12)
SODIUM SERPL-SCNC: 143 MMOL/L (ref 135–147)
T WAVE AXIS: 29 DEGREES
VENTRICULAR RATE: 90 BPM
WBC # BLD AUTO: 10.69 THOUSAND/UL (ref 4.31–10.16)
WBC # BLD AUTO: 8.95 THOUSAND/UL (ref 4.31–10.16)

## 2022-08-25 PROCEDURE — 94760 N-INVAS EAR/PLS OXIMETRY 1: CPT

## 2022-08-25 PROCEDURE — 80048 BASIC METABOLIC PNL TOTAL CA: CPT | Performed by: INTERNAL MEDICINE

## 2022-08-25 PROCEDURE — 84145 PROCALCITONIN (PCT): CPT | Performed by: INTERNAL MEDICINE

## 2022-08-25 PROCEDURE — 94640 AIRWAY INHALATION TREATMENT: CPT

## 2022-08-25 PROCEDURE — 85027 COMPLETE CBC AUTOMATED: CPT | Performed by: INTERNAL MEDICINE

## 2022-08-25 PROCEDURE — 93970 EXTREMITY STUDY: CPT

## 2022-08-25 PROCEDURE — XW033E5 INTRODUCTION OF REMDESIVIR ANTI-INFECTIVE INTO PERIPHERAL VEIN, PERCUTANEOUS APPROACH, NEW TECHNOLOGY GROUP 5: ICD-10-PCS | Performed by: HOSPITALIST

## 2022-08-25 PROCEDURE — 86140 C-REACTIVE PROTEIN: CPT | Performed by: INTERNAL MEDICINE

## 2022-08-25 PROCEDURE — 92610 EVALUATE SWALLOWING FUNCTION: CPT

## 2022-08-25 PROCEDURE — 99223 1ST HOSP IP/OBS HIGH 75: CPT | Performed by: INTERNAL MEDICINE

## 2022-08-25 PROCEDURE — 94664 DEMO&/EVAL PT USE INHALER: CPT

## 2022-08-25 PROCEDURE — 93970 EXTREMITY STUDY: CPT | Performed by: SURGERY

## 2022-08-25 PROCEDURE — 82948 REAGENT STRIP/BLOOD GLUCOSE: CPT

## 2022-08-25 PROCEDURE — 99232 SBSQ HOSP IP/OBS MODERATE 35: CPT | Performed by: INTERNAL MEDICINE

## 2022-08-25 PROCEDURE — 85379 FIBRIN DEGRADATION QUANT: CPT | Performed by: INTERNAL MEDICINE

## 2022-08-25 PROCEDURE — 93010 ELECTROCARDIOGRAM REPORT: CPT | Performed by: INTERNAL MEDICINE

## 2022-08-25 RX ORDER — SODIUM CHLORIDE FOR INHALATION 0.9 %
3 VIAL, NEBULIZER (ML) INHALATION 3 TIMES DAILY PRN
Status: DISCONTINUED | OUTPATIENT
Start: 2022-08-25 | End: 2022-08-30 | Stop reason: HOSPADM

## 2022-08-25 RX ORDER — LEVALBUTEROL INHALATION SOLUTION 0.63 MG/3ML
0.63 SOLUTION RESPIRATORY (INHALATION) 3 TIMES DAILY PRN
Status: DISCONTINUED | OUTPATIENT
Start: 2022-08-25 | End: 2022-08-30 | Stop reason: HOSPADM

## 2022-08-25 RX ORDER — HEPARIN SODIUM 1000 [USP'U]/ML
2000 INJECTION, SOLUTION INTRAVENOUS; SUBCUTANEOUS
Status: DISCONTINUED | OUTPATIENT
Start: 2022-08-25 | End: 2022-08-29

## 2022-08-25 RX ORDER — HEPARIN SODIUM 10000 [USP'U]/100ML
3-20 INJECTION, SOLUTION INTRAVENOUS
Status: DISCONTINUED | OUTPATIENT
Start: 2022-08-25 | End: 2022-08-29

## 2022-08-25 RX ORDER — ALBUTEROL SULFATE 2.5 MG/3ML
SOLUTION RESPIRATORY (INHALATION)
Status: DISPENSED
Start: 2022-08-25 | End: 2022-08-25

## 2022-08-25 RX ORDER — HEPARIN SODIUM 1000 [USP'U]/ML
4000 INJECTION, SOLUTION INTRAVENOUS; SUBCUTANEOUS
Status: DISCONTINUED | OUTPATIENT
Start: 2022-08-25 | End: 2022-08-29

## 2022-08-25 RX ORDER — INSULIN GLARGINE 100 [IU]/ML
10 INJECTION, SOLUTION SUBCUTANEOUS
Status: DISCONTINUED | OUTPATIENT
Start: 2022-08-25 | End: 2022-08-26

## 2022-08-25 RX ORDER — DEXAMETHASONE SODIUM PHOSPHATE 4 MG/ML
6 INJECTION, SOLUTION INTRA-ARTICULAR; INTRALESIONAL; INTRAMUSCULAR; INTRAVENOUS; SOFT TISSUE EVERY 24 HOURS
Status: DISCONTINUED | OUTPATIENT
Start: 2022-08-25 | End: 2022-08-30 | Stop reason: HOSPADM

## 2022-08-25 RX ORDER — LEVALBUTEROL INHALATION SOLUTION 0.63 MG/3ML
0.63 SOLUTION RESPIRATORY (INHALATION) EVERY 4 HOURS PRN
Status: DISCONTINUED | OUTPATIENT
Start: 2022-08-25 | End: 2022-08-25

## 2022-08-25 RX ADMIN — PREGABALIN 75 MG: 75 CAPSULE ORAL at 12:01

## 2022-08-25 RX ADMIN — TICAGRELOR 90 MG: 90 TABLET ORAL at 22:46

## 2022-08-25 RX ADMIN — INSULIN LISPRO 3 UNITS: 100 INJECTION, SOLUTION INTRAVENOUS; SUBCUTANEOUS at 22:47

## 2022-08-25 RX ADMIN — PANTOPRAZOLE SODIUM 40 MG: 40 TABLET, DELAYED RELEASE ORAL at 05:02

## 2022-08-25 RX ADMIN — POLYETHYLENE GLYCOL 3350 17 G: 17 POWDER, FOR SOLUTION ORAL at 11:49

## 2022-08-25 RX ADMIN — HYDROXYZINE HYDROCHLORIDE 25 MG: 25 TABLET ORAL at 11:50

## 2022-08-25 RX ADMIN — ISODIUM CHLORIDE 3 ML: 0.03 SOLUTION RESPIRATORY (INHALATION) at 17:37

## 2022-08-25 RX ADMIN — APIXABAN 5 MG: 5 TABLET, FILM COATED ORAL at 11:49

## 2022-08-25 RX ADMIN — ATORVASTATIN CALCIUM 40 MG: 40 TABLET, FILM COATED ORAL at 17:44

## 2022-08-25 RX ADMIN — INSULIN LISPRO 4 UNITS: 100 INJECTION, SOLUTION INTRAVENOUS; SUBCUTANEOUS at 17:28

## 2022-08-25 RX ADMIN — ALBUTEROL SULFATE 2 PUFF: 90 AEROSOL, METERED RESPIRATORY (INHALATION) at 09:37

## 2022-08-25 RX ADMIN — ESCITALOPRAM OXALATE 10 MG: 10 TABLET ORAL at 11:50

## 2022-08-25 RX ADMIN — ALBUTEROL SULFATE 2 PUFF: 90 AEROSOL, METERED RESPIRATORY (INHALATION) at 19:39

## 2022-08-25 RX ADMIN — METOPROLOL SUCCINATE 50 MG: 50 TABLET, EXTENDED RELEASE ORAL at 17:44

## 2022-08-25 RX ADMIN — REMDESIVIR 200 MG: 100 INJECTION, POWDER, LYOPHILIZED, FOR SOLUTION INTRAVENOUS at 17:21

## 2022-08-25 RX ADMIN — HEPARIN SODIUM 12 UNITS/KG/HR: 10000 INJECTION, SOLUTION INTRAVENOUS at 18:54

## 2022-08-25 RX ADMIN — TICAGRELOR 90 MG: 90 TABLET ORAL at 11:50

## 2022-08-25 RX ADMIN — AMIODARONE HYDROCHLORIDE 100 MG: 200 TABLET ORAL at 11:50

## 2022-08-25 RX ADMIN — LEVALBUTEROL HYDROCHLORIDE 0.63 MG: 0.63 SOLUTION RESPIRATORY (INHALATION) at 17:37

## 2022-08-25 RX ADMIN — METOPROLOL SUCCINATE 50 MG: 50 TABLET, EXTENDED RELEASE ORAL at 04:36

## 2022-08-25 RX ADMIN — BENZONATATE 100 MG: 100 CAPSULE ORAL at 17:44

## 2022-08-25 RX ADMIN — DEXAMETHASONE SODIUM PHOSPHATE 6 MG: 4 INJECTION INTRA-ARTICULAR; INTRALESIONAL; INTRAMUSCULAR; INTRAVENOUS; SOFT TISSUE at 12:02

## 2022-08-25 RX ADMIN — LIDOCAINE 5% 2 PATCH: 700 PATCH TOPICAL at 12:08

## 2022-08-25 RX ADMIN — INSULIN GLARGINE 10 UNITS: 100 INJECTION, SOLUTION SUBCUTANEOUS at 22:47

## 2022-08-25 RX ADMIN — INSULIN LISPRO 2 UNITS: 100 INJECTION, SOLUTION INTRAVENOUS; SUBCUTANEOUS at 12:03

## 2022-08-25 RX ADMIN — MIRTAZAPINE 7.5 MG: 15 TABLET, FILM COATED ORAL at 22:46

## 2022-08-25 RX ADMIN — BENZONATATE 100 MG: 100 CAPSULE ORAL at 11:50

## 2022-08-25 RX ADMIN — FERROUS SULFATE TAB 325 MG (65 MG ELEMENTAL FE) 325 MG: 325 (65 FE) TAB at 11:50

## 2022-08-25 NOTE — ASSESSMENT & PLAN NOTE
Wt Readings from Last 3 Encounters:   08/23/22 80 8 kg (178 lb 3 2 oz)   08/19/22 80 8 kg (178 lb 3 2 oz)   08/18/22 81 1 kg (178 lb 12 7 oz)     · Patient had an admission to [facility] in July for CHF exacerbation  · At that time was maintained on Bumex 2 mg daily and Aldactone 100 mg daily  · Following discharge from [facility] after VATS decortication, it appears Bumex had been discontinued on 08/13 and Aldactone decreased to 50 mg daily  · Review of med list shows patient was not discharged on diuretics  · Chest x-ray today shows mild to moderate pulmonary vascular congestion and small right pleural effusion  · Last echocardiogram was in February of 2022 which showed EF 90%, normal diastolic function    Will check an echocardiogram this admission  · Cardiology on board  · Received Lasix 60 mg 1 dose at ED

## 2022-08-25 NOTE — PLAN OF CARE
Problem: Potential for Falls  Goal: Patient will remain free of falls  Description: INTERVENTIONS:  - Educate patient/family on patient safety including physical limitations  - Instruct patient to call for assistance with activity   - Consult OT/PT to assist with strengthening/mobility   - Keep Call bell within reach  - Keep bed low and locked with side rails adjusted as appropriate  - Keep care items and personal belongings within reach  - Initiate and maintain comfort rounds  - Make Fall Risk Sign visible to staff  - Offer Toileting every 2 Hours, in advance of need  - Initiate/Maintain bed alarm  - Obtain necessary fall risk management equipment  - Apply yellow socks and bracelet for high fall risk patients  - Consider moving patient to room near nurses station  Outcome: Progressing     Problem: Prexisting or High Potential for Compromised Skin Integrity  Goal: Skin integrity is maintained or improved  Description: INTERVENTIONS:  - Identify patients at risk for skin breakdown  - Assess and monitor skin integrity  - Assess and monitor nutrition and hydration status  - Monitor labs   - Assess for incontinence   - Turn and reposition patient  - Assist with mobility/ambulation  - Relieve pressure over bony prominences  - Avoid friction and shearing  - Provide appropriate hygiene as needed including keeping skin clean and dry  - Evaluate need for skin moisturizer/barrier cream  - Collaborate with interdisciplinary team   - Patient/family teaching  - Consider wound care consult   Outcome: Progressing     Problem: RESPIRATORY - ADULT  Goal: Achieves optimal ventilation and oxygenation  Description: INTERVENTIONS:  - Assess for changes in respiratory status  - Assess for changes in mentation and behavior  - Position to facilitate oxygenation and minimize respiratory effort  - Oxygen administered by appropriate delivery if ordered  - Initiate smoking cessation education as indicated  - Encourage broncho-pulmonary hygiene including cough, deep breathe, Incentive Spirometry  - Assess the need for suctioning and aspirate as needed  - Assess and instruct to report SOB or any respiratory difficulty  - Respiratory Therapy support as indicated  Outcome: Progressing

## 2022-08-25 NOTE — PROGRESS NOTES
[facility]  Progress Note - Sylvia Ugarte 1965, 65 y o  female MRN: [id number]  Unit/Bed#: S -01 Encounter: [id number]  Primary Care Provider: Marco العلي MD   Date and time admitted to hospital: 8/24/2022  9:34 AM    * Acute Respiratory insufficiency on chronic hypoxic respiratory failure  Assessment & Plan  · POA, presented from rehab with acute onset respiratory distress and hypoxia shortly after eating  · Symptoms currently resolved and patient feels back at baseline  · Recently discharged from the hospital to rehab on 8/18/22 following admission for right empyema with initial mariajose failed chest tube management and subsequently requiring VATS decortication on 8/6/22  · Has trach in place from prior admissions  Denies increased secretions  · CT chest shows bilateral consolidations which may represent infection  · Will continue supplemental trach collar O2 to maintain sats > 90%  · Continue nebs, respiratory protocol  · Start Remdesivir, Dexamethasone  · Pulmonary on board    COVID-19  Assessment & Plan  · POA, initially tested positive for COVID on 5/19/22 and again today 8/24/22  · Doubt current symptoms related to COVID in view of sudden onset with almost immediate resolution  · Will monitor per mild COVID pathway  · Start Remdesivir and Dexamethasone  · respi  protocol       Chronic heart failure with preserved ejection fraction [facility])  Assessment & Plan  Wt Readings from Last 3 Encounters:   08/23/22 80 8 kg (178 lb 3 2 oz)   08/19/22 80 8 kg (178 lb 3 2 oz)   08/18/22 81 1 kg (178 lb 12 7 oz)     · Patient had an admission to [facility] in July for CHF exacerbation  · At that time was maintained on Bumex 2 mg daily and Aldactone 100 mg daily  · Following discharge from [facility] after VATS decortication, it appears Bumex had been discontinued on 08/13 and Aldactone decreased to 50 mg daily  · Review of med list shows patient was not discharged on diuretics  · Chest x-ray today shows mild to moderate pulmonary vascular congestion and small right pleural effusion  · Last echocardiogram was in February of 2022 which showed EF 78%, normal diastolic function  Will check an echocardiogram this admission  · Cardiology on board  · Received Lasix 60 mg 1 dose at ED    Chronic kidney disease  Assessment & Plan  Lab Results   Component Value Date    EGFR 51 08/25/2022    EGFR 44 08/24/2022    EGFR 30 08/18/2022    CREATININE 1 18 08/25/2022    CREATININE 1 34 (H) 08/24/2022    CREATININE 1 85 (H) 08/18/2022     · Baseline creatinine around 0 9-1 0 based on review of most recent admission  · Creatinine elevated at 1 34 today although improved from 1 85 on most recent check at the time of her discharge to rehab  · Will continue to monitor closely while on diuretics    Tracheostomy in place [facility])  Assessment & Plan  · Also with history of subglottic stenosis  · Continue routine trach care  · Outpatient follow-up with pulmonology/ENT    History of Cardiac arrest [facility])  Assessment & Plan  · Continue home medications, monitor on telemetry    Type 2 diabetes mellitus with hyperglycemia [facility])  Assessment & Plan  Lab Results   Component Value Date    HGBA1C 8 1 (H) 08/24/2022       Recent Labs     08/24/22  1828 08/24/22  2141 08/25/22  0727 08/25/22  1045   POCGLU 142* 218* 64* 219*       Blood Sugar Average: Last 72 hrs:  · (P) 160 75   · Has been uncontrolled based on most recent hemoglobin A1c results  · She will be continued on basal insulin per most recent discharge doses  · Accu-Cheks a c  HS with correctional scale insulin  · Will repeat hemoglobin A1c this admission        VTE Pharmacologic Prophylaxis: VTE Score: 3 Moderate Risk (Score 3-4) - Pharmacological DVT Prophylaxis Ordered: apixaban (Eliquis)  Patient Centered Rounds: I performed bedside rounds with nursing staff today  Education and Discussions with Family / Patient: Patient refused      Current Length of Stay: 1 day(s)  Current Patient Status: Inpatient   Discharge Plan: Anticipate discharge in 24-48 hrs to to be determined    Code Status: Level 1 - Full Code    Subjective:   Patient had 1 episode of shortness breath in the morning  No fever, cough, chills, rigors, nausea, vomiting or diarrhea  She is on 8 l of oxygen and sating at 98%  Her breathing has improved a little bit from yesterday  No increased sputum production according to patient  Objective:     Vitals:   Temp (24hrs), Av 1 °F (36 7 °C), Min:97 7 °F (36 5 °C), Max:98 3 °F (36 8 °C)    Temp:  [97 7 °F (36 5 °C)-98 3 °F (36 8 °C)] 98 3 °F (36 8 °C)  HR:  [61-66] 64  Resp:  [18-20] 18  BP: (136-164)/(73-92) 159/90  SpO2:  [97 %-100 %] 98 %  There is no height or weight on file to calculate BMI  Input and Output Summary (last 24 hours): Intake/Output Summary (Last 24 hours) at 2022 1655  Last data filed at 2022 1100  Gross per 24 hour   Intake 120 ml   Output 1250 ml   Net -1130 ml       Physical Exam:   Physical Exam  Constitutional:       Appearance: Normal appearance  HENT:      Head: Normocephalic and atraumatic  Nose: No congestion  Eyes:      Conjunctiva/sclera: Conjunctivae normal    Cardiovascular:      Rate and Rhythm: Normal rate and regular rhythm  Pulses: Normal pulses  Heart sounds: Normal heart sounds  Pulmonary:      Effort: Pulmonary effort is normal  No respiratory distress  Abdominal:      General: Abdomen is flat  Bowel sounds are normal       Palpations: Abdomen is soft  Skin:     General: Skin is warm and dry  Capillary Refill: Capillary refill takes less than 2 seconds  Neurological:      General: No focal deficit present  Mental Status: She is alert and oriented to person, place, and time     Psychiatric:         Mood and Affect: Mood normal          Behavior: Behavior normal           Additional Data:     Labs:  Results from last 7 days   Lab Units 22  0653 08/25/22  0436 08/24/22  0951   WBC Thousand/uL 8 95   < > 10 32*   HEMOGLOBIN g/dL 8 7*   < > 9 3*   HEMATOCRIT % 30 8*   < > 31 2*   PLATELETS Thousands/uL 431*   < > 574*   NEUTROS PCT %  --   --  76*   LYMPHS PCT %  --   --  15   MONOS PCT %  --   --  6   EOS PCT %  --   --  2    < > = values in this interval not displayed       Results from last 7 days   Lab Units 08/25/22  0436 08/24/22  0951   SODIUM mmol/L 143 141   POTASSIUM mmol/L 3 8 4 3   CHLORIDE mmol/L 110* 108   CO2 mmol/L 28 24   BUN mg/dL 30* 29*   CREATININE mg/dL 1 18 1 34*   ANION GAP mmol/L 5 9   CALCIUM mg/dL 8 3* 8 4   ALBUMIN g/dL  --  3 1*   TOTAL BILIRUBIN mg/dL  --  0 38   ALK PHOS U/L  --  180*   ALT U/L  --  18   AST U/L  --  28   GLUCOSE RANDOM mg/dL 67 265*         Results from last 7 days   Lab Units 08/25/22  1610 08/25/22  1045 08/25/22  0727 08/24/22  2141 08/24/22  1828   POC GLUCOSE mg/dl 296* 219* 64* 218* 142*     Results from last 7 days   Lab Units 08/24/22  0951   HEMOGLOBIN A1C % 8 1*     Results from last 7 days   Lab Units 08/25/22  1054 08/24/22  0951   LACTIC ACID mmol/L  --  1 8   PROCALCITONIN ng/ml 1 00* 0 11       Lines/Drains:  Invasive Devices  Report    Peripherally Inserted Central Catheter Line  Duration           PICC Line 08/11/22 Right Brachial 14 days          Peripheral Intravenous Line  Duration           Peripheral IV 08/24/22 Right Antecubital 1 day          Airway  Duration           Surgical Airway Shiley Fenestrated 176 days                     Telemetry:  Telemetry Orders (From admission, onward)             48 Hour Telemetry Monitoring  Continuous x 48 hours        References:    Telemetry Guidelines   Question:  Reason for 48 Hour Telemetry  Answer:  Acute Decompensated CHF (continuous diuretic infusion or total diuretic dose > 200 mg daily, associated electrolyte derangement, ionotropic drip, history of ventricular arrhythmia, or new EF <35%)                            Imaging: Reviewed radiology reports from this admission including: chest xray    Recent Cultures (last 7 days):   Results from last 7 days   Lab Units 08/24/22  0951   BLOOD CULTURE  No Growth at 24 hrs  No Growth at 24 hrs         Last 24 Hours Medication List:   Current Facility-Administered Medications   Medication Dose Route Frequency Provider Last Rate    acetaminophen  975 mg Oral Q8H [address] Treasure Calero MD      albuterol           albuterol  2 puff Inhalation Q4H PRN Nick Robledo MD      amiodarone  100 mg Oral Daily With Breakfast Treasure Calero MD      atorvastatin  40 mg Oral Daily With Nito Tim MD      benzonatate  100 mg Oral TID PRN Nick Robledo MD      dexamethasone  6 mg Intravenous Q24H Milvia Prince MD      escitalopram  10 mg Oral Daily Treasure Calero MD      ferrous sulfate  325 mg Oral Daily With Breakfast Treasure Calero MD      heparin (porcine)  3-20 Units/kg/hr (Order-Specific) Intravenous Titrated Horace Ramsey MD      heparin (porcine)  2,000 Units Intravenous Q1H PRN Horace Ramsey MD      heparin (porcine)  4,000 Units Intravenous Q1H PRN Horace Ramsey MD      hydrOXYzine HCL  25 mg Oral Q6H PRN Nick Robledo MD      insulin glargine  10 Units Subcutaneous HS Dena Manning MD      insulin lispro  1-5 Units Subcutaneous HS Treasure Calero MD      insulin lispro  1-6 Units Subcutaneous TID AC Treasure Calero MD      levalbuterol  0 63 mg Nebulization TID PRN Dena Manning MD      lidocaine  2 patch Topical Daily Treasure Calero MD      LORazepam  0 5 mg Oral Q8H PRN Nick Robledo MD      metoprolol succinate  50 mg Oral Q12H Treasure Calero MD      mirtazapine  7 5 mg Oral HS Treasure Calero MD      pantoprazole  40 mg Oral Early Morning Treasure Calero MD      polyethylene glycol  17 g Oral Daily Treasure Calero MD      pregabalin 75 mg Oral Daily Reinier Schwarz MD      remdesivir  200 mg Intravenous Q24H Milvia Chaudhari MD      Followed by   Gatito Jaime ON 8/26/2022] remdesivir  100 mg Intravenous Q24H Arron Boyer MD      sodium chloride  3 mL Nebulization TID PRN Janki Dobson MD      ticagrelor  90 mg Oral Q12H Jeannette Kessler MD          Today, Patient Was Seen By: Arron Boyer MD    **Please Note: This note may have been constructed using a voice recognition system  **

## 2022-08-25 NOTE — UTILIZATION REVIEW
Initial Clinical Review    Admission: Date/Time/Statement:   Admission Orders (From admission, onward)     Ordered        08/24/22 1409  INPATIENT ADMISSION  Once                      Orders Placed This Encounter   Procedures    INPATIENT ADMISSION     Standing Status:   Standing     Number of Occurrences:   1     Order Specific Question:   Level of Care     Answer:   Med Surg [16]     Order Specific Question:   Estimated length of stay     Answer:   More than 2 Midnights     Order Specific Question:   Certification     Answer:   I certify that inpatient services are medically necessary for this patient for a duration of greater than two midnights  See H&P and MD Progress Notes for additional information about the patient's course of treatment  ED Arrival Information     Expected   -    Arrival   8/24/2022 09:33    Acuity   Emergent            Means of arrival   Ambulance    Escorted by   [facility]    Admission type   Emergency            Arrival complaint   resp distress           Chief Complaint   Patient presents with    Respiratory Distress       Initial Presentation: 65 y o  female presented to the ED with sudden onset shortness of breath shortly after eating  Patient has recent history of right empyema and is status post VATS decortication on 8/6/22  She was discharged to rehab on 8/18/22 and completed antibiotic course on 8/20/22  She presented from rehab with shortness of breath  She is status post trach placement secondary to subglottic stenosis  PMH: anxiety, aortic aneurysm, depression, DM, GERD, HLD, HTN, MI  Plan: Inpatient admission for evaluation and treatment of acute resp insufficiency on chronic hypoxic resp failure, Covid 19, tracheostomy CKD wit creatinine of 1 34 (baseline 0 -1 0): continue supplemental trach collar O2 to maintain sats > 90%, swallow eval, continue neb treatments, Lasix IV 60 mg x1, CT chest, continue routine trach care  Date: 8/25   Day 2:     Pulmonology consult: Suspect acute hypoxic respiratory insufficiency likely multifactorial in the setting of COVID-19, recent empyema status post VATS/decortication, possible aspiration, volume overload, possible VTE  In terms of COVID-19 management, agree with the 6 mg daily of Decadron, can continue remdesivir  Will assess oxygenation trajectory over the next 12 hours to determine if we need to initiate Actemra/baricitinib  Elevated D-dimer noted above 6, based on protocol, will switch to heparin infusion, discontinue Eliquis for now  Internal medicine: CT chest shows bilateral consolidations which may represent infection  Will continue supplemental trach collar O2 to maintain sats > 90%  Continue nebs  Start remdesivier and dexamethasone         ED Triage Vitals   Temperature Pulse Respirations Blood Pressure SpO2   08/24/22 0944 08/24/22 0944 08/24/22 0944 08/24/22 0944 08/24/22 0944   98 2 °F (36 8 °C) 86 18 (!) 206/93 99 %      Temp Source Heart Rate Source Patient Position - Orthostatic VS BP Location FiO2 (%)   08/24/22 0944 08/24/22 0944 08/24/22 0944 08/24/22 0944 08/24/22 0944   Oral Monitor Sitting Right arm 100      Pain Score       08/24/22 1600       5          Wt Readings from Last 1 Encounters:   08/23/22 80 8 kg (178 lb 3 2 oz)     Additional Vital Signs:     Date/Time Temp Pulse Resp BP MAP (mmHg) SpO2 FiO2 (%) O2 Flow Rate (L/min) O2 Device   08/25/22 0748 -- -- -- -- -- 98 % 35 -- Trach mask   08/25/22 07:28:02 97 7 °F (36 5 °C) 61 20 146/73 97 98 % -- -- --   08/25/22 0436 -- 62 -- 136/74 -- -- -- -- --   08/25/22 0310 -- -- -- -- -- -- 35 8 L/min Trach mask   08/24/22 21:41:33 98 2 °F (36 8 °C) 65 19 147/81 103 97 % -- -- --   08/24/22 2055 -- -- -- -- -- -- 40 8 L/min Trach mask   08/24/22 1903 -- -- -- -- -- -- 40 4 L/min None (Room air)   08/24/22 18:06:52 98 °F (36 7 °C) 66 18 164/92 116 100 % -- -- --   08/24/22 1600 -- 66 20 156/70 101 98 % -- 4 L/min -- 08/24/22 1526 -- 68 20 -- -- -- -- -- --   08/24/22 1500 -- 62 -- 121/58 84 99 % -- -- --   08/24/22 1400 -- 66 18 141/62 89 99 % -- -- None (Room air)   08/24/22 1300 -- 66 -- 140/62 89 100 % -- -- --   08/24/22 1230 -- 66 18 120/58 84 100 % -- -- --   08/24/22 1200 -- 68 -- -- -- 97 % -- -- --   08/24/22 1100 -- 70 18 133/61 88 100 % 40 12 L/min Trach mask   08/24/22 1015 -- 74 18 158/69 99 100 % 40 12 L/min Trach mask   08/24/22 1008 -- -- -- -- -- 100 % 100 12 L/min Trach mask       Pertinent Labs/Diagnostic Test Results:   CT chest without contrast   Final Result by Danyelle Rodríguez DO (08/24 1848)      Consolidations within the lungs bilaterally which may represent infection  Recommend short-term follow-up chest CT scan in 3 months to evaluate for resolution  Bilateral pleural effusions  The study was marked in [facility] for immediate notification  Workstation performed: [id number]         XR chest 1 view portable   Final Result by Naif Lynch MD (08/24 1149)      Mild to moderate pulmonary vascular congestion  Small right pleural effusion                    Workstation performed: [id number]           Results from last 7 days   Lab Units 08/24/22  1126   SARS-COV-2  Positive*     Results from last 7 days   Lab Units 08/25/22  0436 08/24/22  0951   WBC Thousand/uL 10 69* 10 32*   HEMOGLOBIN g/dL 7 8* 9 3*   HEMATOCRIT % 25 7* 31 2*   PLATELETS Thousands/uL 436* 574*   NEUTROS ABS Thousands/µL  --  7 79*         Results from last 7 days   Lab Units 08/25/22  0436 08/24/22  0951   SODIUM mmol/L 143 141   POTASSIUM mmol/L 3 8 4 3   CHLORIDE mmol/L 110* 108   CO2 mmol/L 28 24   ANION GAP mmol/L 5 9   BUN mg/dL 30* 29*   CREATININE mg/dL 1 18 1 34*   EGFR ml/min/1 73sq m 51 44   CALCIUM mg/dL 8 3* 8 4     Results from last 7 days   Lab Units 08/24/22  0951   AST U/L 28   ALT U/L 18   ALK PHOS U/L 180*   TOTAL PROTEIN g/dL 8 3   ALBUMIN g/dL 3 1*   TOTAL BILIRUBIN mg/dL 0 38     Results from last 7 days   Lab Units 08/25/22  1045 08/25/22  0727 08/24/22  2141 08/24/22  1828   POC GLUCOSE mg/dl 219* 64* 218* 142*     Results from last 7 days   Lab Units 08/25/22  0436 08/24/22  0951   GLUCOSE RANDOM mg/dL 67 265*         Results from last 7 days   Lab Units 08/24/22  0951   HEMOGLOBIN A1C % 8 1*   EAG mg/dl 186         Results from last 7 days   Lab Units 08/24/22  1126 08/24/22  0951   PH FRANCO  7 307 7 224*   PCO2 FRANCO mm Hg 49 9 59 9*   PO2 FRANCO mm Hg 51 4* 44 0   HCO3 FRANCO mmol/L 24 4 24 2   BASE EXC FRANCO mmol/L -2 0 -3 8   O2 CONTENT FRANCO ml/dL 83 7 71 4   O2 HGB, VENOUS % 82 6* 69 5             Results from last 7 days   Lab Units 08/24/22  1443 08/24/22  1153 08/24/22  0951   HS TNI 0HR ng/L  --   --  15 1   HS TNI 2HR ng/L  --  17  --    HSTNI D2 ng/L  --  1 9  --    HS TNI 4HR ng/L 19  --   --    HSTNI D4 ng/L 3 9  --   --      Results from last 7 days   Lab Units 08/25/22  1054   D-DIMER QUANTITATIVE ug/ml FEU 6 23*             Results from last 7 days   Lab Units 08/25/22  1054 08/24/22  0951   PROCALCITONIN ng/ml 1 00* 0 11     Results from last 7 days   Lab Units 08/24/22  0951   LACTIC ACID mmol/L 1 8         Results from last 7 days   Lab Units 08/24/22  1126   INFLUENZA A PCR  Negative   INFLUENZA B PCR  Negative   RSV PCR  Negative         Results from last 7 days   Lab Units 08/24/22  0951   BLOOD CULTURE  Received in Microbiology Lab  Culture in Progress  Received in Microbiology Lab  Culture in Progress                 ED Treatment:   Medication Administration from 08/24/2022 0933 to 08/24/2022 1746       Date/Time Order Dose Route Action     08/24/2022 0950 ipratropium-albuterol (FOR EMS ONLY) (DUO-NEB) 0 5-2 5 mg/3 mL inhalation solution 6 mL 0 mL Does not apply Given to EMS     08/24/2022 1010 albuterol inhalation solution 10 mg 10 mg Nebulization Given     08/24/2022 1011 ipratropium (ATROVENT) 0 02 % inhalation solution 1 mg 1 mg Nebulization Given     08/24/2022 1011 sodium chloride 0 9 % inhalation solution 12 mL 12 mL Nebulization Given     08/24/2022 1009 albuterol (2 5 mg/3 mL) 0 083 % inhalation solution **ADS Override Pull** 10 mg  Given     08/24/2022 1600 metoprolol succinate (TOPROL-XL) 24 hr tablet 50 mg 50 mg Oral Given     08/24/2022 1600 atorvastatin (LIPITOR) tablet 40 mg 40 mg Oral Given     08/24/2022 1632 lidocaine (LIDODERM) 5 % patch 2 patch 1 patch Topical Medication Applied     08/24/2022 1626 furosemide (LASIX) injection 60 mg 60 mg Intravenous Given        Past Medical History:   Diagnosis Date    Anxiety     [facility]     Arthritis     Depression     Diabetes mellitus ([facility] [address] )     Fibromyalgia     GERD (gastroesophageal reflux disease)     GERD (gastroesophageal reflux disease)     H/O cardiovascular stress test 09/2018    no ischemia  EF 70%   H/O echocardiogram 01/2019    EF 60%  Mild LVH  trivial effusion   Hyperlipidemia     Hypertension     Migraines     Psychiatric disorder     anxiety    Uncontrolled hypertension 2/25/2015    Last Assessment & Plan:  BP today above goal <140/90, apparently asymptomatic  Prior BP elevations were attributed to not taking medication  Consider increased medication if persistent on f/u       Present on Admission:   Type 2 diabetes mellitus with hyperglycemia ([facility])   History of Cardiac arrest ([facility])   CKD (chronic kidney disease) stage 3, GFR 30-59 ml/min ([facility])   Acute Respiratory insufficiency on chronic hypoxic respiratory failure   Chronic heart failure with preserved ejection fraction ([facility])      Admitting Diagnosis: Respiratory distress [R06 03]  Hypoxia [R09 02]  COVID-19 [U07 1]  Age/Sex: 65 y o  female  Admission Orders:  Scheduled Medications:  acetaminophen, 975 mg, Oral, Q8H CEFERINO  albuterol, , ,   amiodarone, 100 mg, Oral, Daily With Breakfast  apixaban, 5 mg, Oral, BID  atorvastatin, 40 mg, Oral, Daily With Dinner  dexamethasone, 6 mg, Intravenous, Q24H  escitalopram, 10 mg, Oral, Daily  ferrous sulfate, 325 mg, Oral, Daily With Breakfast  insulin glargine, 10 Units, Subcutaneous, HS  insulin lispro, 1-5 Units, Subcutaneous, HS  insulin lispro, 1-6 Units, Subcutaneous, TID AC  lidocaine, 2 patch, Topical, Daily  metoprolol succinate, 50 mg, Oral, Q12H  mirtazapine, 7 5 mg, Oral, HS  pantoprazole, 40 mg, Oral, Early Morning  polyethylene glycol, 17 g, Oral, Daily  pregabalin, 75 mg, Oral, Daily  remdesivir, 200 mg, Intravenous, Q24H   Followed by  Evin Mccabe ON 8/26/2022] remdesivir, 100 mg, Intravenous, Q24H  ticagrelor, 90 mg, Oral, Q12H [address]      Continuous IV Infusions:     PRN Meds:  albuterol, 2 puff, Inhalation, Q4H PRN  benzonatate, 100 mg, Oral, TID PRN  hydrOXYzine HCL, 25 mg, Oral, Q6H PRN  levalbuterol, 0 63 mg, Nebulization, TID PRN  LORazepam, 0 5 mg, Oral, Q8H PRN  sodium chloride, 3 mL, Nebulization, TID PRN        IP CONSULT TO PULMONOLOGY    Network Utilization Review Department  ATTENTION: Please call with any questions or concerns to [phone] and carefully listen to the prompts so that you are directed to the right person  All voicemails are confidential   Abdifatah Reed all requests for admission clinical reviews, approved or denied determinations and any other requests to dedicated fax number below belonging to the campus where the patient is receiving treatment   List of dedicated fax numbers for the Facilities:  [address] DENIALS (Administrative/Medical Necessity) [phone]   [address] [place] [address] (Maternity/NICU/Pediatrics) 261 [facility],[address] [address]  [address] [address] [facility] [place] Meganida Kashmir Tala 8452 [address] [address]  [address] [facility] Samara Phuong Velázquez 1481 [address] [facility] [phone]

## 2022-08-25 NOTE — PROGRESS NOTES
RN called to pt bedside as patient is struggling to breathe  O2 sat is 100 percent but patient's breathing is definitely   Labored  Suctioned patient x2 with minimal relief  Patient states that she had the same thing happen yestereday in the ED and a breathing treatment was given that   Helped  RT came to bedside and suggested xopenex be ordered  Provider contacted and order for xopenex was requested  Awaiting response

## 2022-08-25 NOTE — ASSESSMENT & PLAN NOTE
· POA, presented from rehab with acute onset respiratory distress and hypoxia shortly after eating  · Symptoms currently resolved and patient feels back at baseline  · Recently discharged from the hospital to rehab on 8/18/22 following admission for right empyema with initial mariajose failed chest tube management and subsequently requiring VATS decortication on 8/6/22  · Has trach in place from prior admissions  Denies increased secretions  · CT chest shows bilateral consolidations which may represent infection  · Will continue supplemental trach collar O2 to maintain sats > 90%  · Continue nebs, respiratory protocol  · Start Remdesivir, Dexamethasone     · Pulmonary on board

## 2022-08-25 NOTE — CONSULTS
Pulmonary Medicine-Consultation  Amanda Lee 65 y o  female MRN: [id number]  Unit/Bed#: S -01 Encounter: [id number]      Assessment  1  Acute on chronic respiratory failure, status post tracheostomy for history of subglottic stenosis  2  Recent empyema status post VATS/decortication, completed antibiotics on 08/20  3  COVID 19 Positive  4  Type 2 DM  5  CKD Stage 3  6  HFpEF  7  History of Cardiac Arrest    Plan:  · Suspect acute hypoxic respiratory insufficiency likely multifactorial in the setting of COVID-19, recent empyema status post VATS/decortication, possible aspiration, volume overload, possible VTE  · In terms of COVID-19 management, agree with the 6 mg daily of Decadron, can continue remdesivir  Will assess oxygenation trajectory over the next 12 hours to determine if we need to initiate Actemra/baricitinib  · Elevated D-dimer noted above 6, based on protocol, will switch to heparin infusion, discontinue Eliquis for now  · She recently was treated with a prolonged course of antibiotics for her recent empyema, does have some productive sputum, can monitor off antibiotics for now, continue to trend fever curve, procalcitonin  Previous sputum cultures have been positive for Pseudomonas/MSSA which, she is likely colonized with bacteria at this point, we will need to follow her clinically to determine if she would need a further course of antibiotics    · Received 60 mg IV Lasix, continue diuresis per primary/Cardiology team to optimize volume status  · Appreciate speech team evaluation given history of dysphagia    Will continue to follow    History of Present Illness   Physician Requesting Consult: Davidson Hernández MD  Reason for Consult / Principal Problem: Chronic Hypoxic Respiratory Insufficiency, Trachelotomy, COVID 19 positive    HPI: Amanda Lee is a 65 y o  female has past medical history of depression,MI, complicated ventilator course s/p  tracheostomy, GERD, hypertension, and anxiety who presented to the hospital with sudden-onset shortness of breath  Aristides Ortiz had a prolonged recent admission for history of right empyema, status post VATS/decortication on 08/06/2022  She was discharged to rehab on 08/18 completed antibiotic course on 8/20  She presented from rehab with shortness of breath  She was incidentally found to test COVID positive  She has a chronic tracheostomy secondary to to previous subglottic stenosis  She is currently on 8 L trach collar, she feels like her breathing is a bit better than it was yesterday  She is complaining of some productive sputum coming out of her trach  Inpatient consult to Pulmonology  Consult performed by: Samanta Thomas MD  Consult ordered by: Lonny Greene MD          Review of Systems   Constitutional: Negative for activity change, chills, fever and unexpected weight change  HENT: Negative for congestion, rhinorrhea and voice change  Respiratory: Positive for cough and shortness of breath  Negative for chest tightness and wheezing  Cardiovascular: Negative for chest pain and palpitations  Gastrointestinal: Negative for abdominal distention, constipation, diarrhea, nausea and vomiting  Endocrine: Negative for cold intolerance and heat intolerance  Genitourinary: Negative for dysuria, frequency and urgency  Musculoskeletal: Negative for arthralgias, joint swelling and myalgias  Skin: Negative for color change, pallor and rash  Neurological: Negative for dizziness, weakness and numbness  Psychiatric/Behavioral: Negative for agitation and confusion  The patient is not nervous/anxious  Historical Information   Past Medical History:   Diagnosis Date    Anxiety     Aortic aneurysm ([facility] [address] )     Arthritis     Depression     Diabetes mellitus (HCC)     Fibromyalgia     GERD (gastroesophageal reflux disease)     GERD (gastroesophageal reflux disease)     H/O cardiovascular stress test 09/2018    no ischemia  EF 70%      H/O echocardiogram 2019    EF 60%  Mild LVH  trivial effusion   Hyperlipidemia     Hypertension     Migraines     Psychiatric disorder     anxiety    Uncontrolled hypertension 2015    Last Assessment & Plan:  BP today above goal <140/90, apparently asymptomatic  Prior BP elevations were attributed to not taking medication  Consider increased medication if persistent on f/u  Past Surgical History:   Procedure Laterality Date    BACK SURGERY      Lumbar epidural steroid injection    CARDIAC CATHETERIZATION N/A 10/22/2021    Procedure: Cardiac pci;  Surgeon: Saturnino Yi MD;  [facility]; Service: Cardiology    CARDIAC CATHETERIZATION  10/22/2021    Procedure: Cardiac catheterization;  Surgeon: Saturnino Yi MD;  [facility]; Service: Cardiology    CARDIAC CATHETERIZATION Left 10/27/2021    Procedure: Cardiac Left Heart Cath;  Surgeon: Khoi Ballesteros MD;  [facility]; Service: Cardiology    CARDIAC CATHETERIZATION N/A 10/27/2021    Procedure: Cardiac pci;  Surgeon: Khoi Ballesteros MD;  [facility]; Service: Cardiology    CARDIAC CATHETERIZATION N/A 10/28/2021    Procedure: Cardiac pci;  Surgeon: Carie Eid DO;  [facility];   Service: Cardiology    CARPAL TUNNEL RELEASE Left      SECTION      CHOLECYSTECTOMY      COLONOSCOPY      incomplete    COLONOSCOPY      EYE SURGERY      HYSTERECTOMY      Total    IR CHEST TUBE PLACEMENT  2022    IR CHEST TUBE PLACEMENT  2022    IR CHEST TUBE PLACEMENT  2022    LUNG DECORTICATION Right 2022    Procedure: DECORTICATION LUNG;  Surgeon: Korina Nuñez MD;  [facility];  Service: Thoracic    OVARIAN CYST REMOVAL      PEG W/TRACHEOSTOMY PLACEMENT N/A 2021    Procedure: TRACHEOSTOMY WITH INSERTION PEG TUBE;  Surgeon: Vandana Fung DO;  [facility];  Service: General    THORACOSCOPY VIDEO ASSISTED SURGERY (VATS) Right 8/6/2022    Procedure: THORACOSCOPY VIDEO ASSISTED SURGERY (VATS), RIGHT;  Surgeon: Ella Del Rio MD;  [facility];  Service: Thoracic    TUBAL LIGATION      UPPER GASTROINTESTINAL ENDOSCOPY       Social History   Social History     Substance and Sexual Activity   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  Social History     Substance and Sexual Activity   Drug Use Yes    Types: Marijuana    Comment: medical; seldom use     Social History     Tobacco Use   Smoking Status Former Smoker    Packs/day: 1 00    Years: 30 00    Pack years: 30 00    Types: Cigarettes   Smokeless Tobacco Never Used     E-Cigarette/Vaping    E-Cigarette Use Never User      E-Cigarette/Vaping Substances    Nicotine No     THC No     CBD No     Flavoring No     Other No     Unknown No      Family History: non-contributory    Meds/Allergies   all current active meds have been reviewed    Allergies   Allergen Reactions    Metformin Diarrhea    Clonidine Rash     Patch        Objective   Vitals: Blood pressure 146/73, pulse 61, temperature 97 7 °F (36 5 °C), resp  rate 20, SpO2 98 %  ,There is no height or weight on file to calculate BMI  Intake/Output Summary (Last 24 hours) at 8/25/2022 1335  Last data filed at 8/25/2022 1100  Gross per 24 hour   Intake 120 ml   Output 1250 ml   Net -1130 ml     Invasive Devices  Report    Peripherally Inserted Central Catheter Line  Duration           PICC Line 08/11/22 Right Brachial 14 days          Peripheral Intravenous Line  Duration           Peripheral IV 08/24/22 Right Antecubital 1 day          Airway  Duration           Surgical Airway Shiley Fenestrated 176 days                Physical Exam  Constitutional:       Appearance: Normal appearance     HENT:      Mouth/Throat:      Mouth: Mucous membranes are moist    Neck:      Comments: Trach in Place  Cardiovascular:      Rate and Rhythm: Normal rate and regular rhythm  Pulmonary:      Effort: Pulmonary effort is normal  No respiratory distress  Breath sounds: Normal breath sounds  No wheezing  Abdominal:      General: Abdomen is flat  Palpations: Abdomen is soft  Musculoskeletal:         General: No swelling  Normal range of motion  Cervical back: Normal range of motion and neck supple  Right lower leg: No edema  Left lower leg: No edema  Skin:     General: Skin is warm  Neurological:      General: No focal deficit present  Mental Status: She is alert and oriented to person, place, and time  Psychiatric:         Mood and Affect: Mood normal          Behavior: Behavior normal        Lab Results: I have personally reviewed pertinent lab results  Imaging Studies: I have personally reviewed pertinent reports  and I have personally reviewed pertinent films in PACS     EKG, Pathology, and Other Studies: I have personally reviewed pertinent reports  and I have personally reviewed pertinent films in PACS     Pulmonary Results (PFTs, PSG): I have personally reviewed pertinent reports  and I have personally reviewed pertinent films in PACS     VTE Prophylaxis: Sequential compression device (Venodyne)     Code Status: Level 1 - Full Code    None    Portions of the record may have been created with voice recognition software  Occasional wrong word or "sound a like" substitutions may have occurred due to the inherent limitations of voice recognition software  Read the chart carefully and recognize, using context, where substitutions have occurred

## 2022-08-25 NOTE — ASSESSMENT & PLAN NOTE
Lab Results   Component Value Date    HGBA1C 8 1 (H) 08/24/2022       Recent Labs     08/24/22  1828 08/24/22  2141 08/25/22  0727 08/25/22  1045   POCGLU 142* 218* 64* 219*       Blood Sugar Average: Last 72 hrs:  · (P) 160 75   · Has been uncontrolled based on most recent hemoglobin A1c results  · She will be continued on basal insulin per most recent discharge doses  · Accu-Cheks a c  HS with correctional scale insulin  · Will repeat hemoglobin A1c this admission

## 2022-08-25 NOTE — ASSESSMENT & PLAN NOTE
· Also with history of subglottic stenosis  · Continue routine trach care  · Outpatient follow-up with pulmonology/ENT

## 2022-08-25 NOTE — WOUND OSTOMY CARE
Progress Note - Wound   Kelli Red 65 y o  female MRN: [id number]  Unit/Bed#: S -01 Encounter: [id number]      Assessment:   Patient admitted due to acute respiratory insufficiency  History of diabetes, aortic aneurysm, fibromyalgia, GERD, HLD, HTN, subglottic stenosis with tracheostomy in place, CKD, CHF, cardiac arrest  Wound care consulted for sacral wound  Patient agreeable to assessment, alert and oriented x4, continent of bowel, incontinent of bladder at times,  Independently turns for assessment, is independent with most care  Primary RN made aware of assessment findings  1  Pressure injury mid sacrum, unstageable-POA- Two wound pictured and measure together  Wounds are oval in shape, approx  30% non-blanchable pink tissue and 70% yellow adhered tissue, with no drainage noted  Anna-wound is dry, intact, small area of brown scaly skin, blanchable pink scar tissue and blanchable hyperpigmented skin  2  Bilateral elbows, buttock, hips, and heels-skin is dry, intact, blanchable  Educated patient on importance of frequent offloading of pressure via turning, repositioning, and weight-shifting  Verbalized understanding of plan of care  No induration, fluctuance, odor, warmth, redness, or purulence noted to the above noted wound  New dressing applied  Patient tolerated well, denies pain to the wound  Primary nurse aware of plan of care  See flow sheets for more detailed assessment findings  Will follow along  Skin care plans:  1-Cleanse sacro-buttocks with soap and water  Apply TRIAD paste to open area on sacrum and cover with allevyn foam dressing  Change every other day and as needed  2-Hydraguard to bilateral heels and buttock BID and PRN  3-Elevate heels to offload pressure  4-Ehob cushion when out of bed  5-Turn/reposition q2h for pressure re-distribution on skin    6-Moisturize skin daily with skin nourishing cream        Wound 08/24/22 Pressure Injury Sacrum (Active)   Wound Image 08/25/22 1040   Wound Description Yellow;Pink 08/25/22 1040   Pressure Injury Stage U 08/25/22 1040   Anna-wound Assessment Dry; Intact;Scar Tissue 08/25/22 1040   Wound Length (cm) 2 5 cm 08/25/22 1040   Wound Width (cm) 3 cm 08/25/22 1040   Wound Depth (cm) 0 2 cm 08/25/22 1040   Wound Surface Area (cm^2) 7 5 cm^2 08/25/22 1040   Wound Volume (cm^3) 1 5 cm^3 08/25/22 1040   Calculated Wound Volume (cm^3) 1 5 cm^3 08/25/22 1040   Tunneling 0 cm 08/25/22 1040   Undermining 0 08/25/22 1040   Drainage Amount None 08/25/22 1040   Non-staged Wound Description Not applicable 36/96/66 8300   Treatments Cleansed;Site care 08/25/22 1040   Dressing Foam, Silicon (eg  Allevyn, etc); Other (Comment) 08/25/22 1040   Wound packed? No 08/25/22 1040   Dressing Changed Changed 08/25/22 1040   Patient Tolerance Tolerated well 08/25/22 1040   Dressing Status Clean;Dry; Intact 08/25/22 1040     Call or tigertext with any questions  Wound Care will continue to follow    Rolando Priest BSN RN CWON  Wound and Ostomy care

## 2022-08-25 NOTE — SPEECH THERAPY NOTE
Speech-Language Pathology Bedside Swallow Evaluation        Patient Name: Godfrey Gupta    DSUFE'X Date: 8/25/2022     Problem List  Principal Problem:    Acute Respiratory insufficiency on chronic hypoxic respiratory failure  Active Problems:    Type 2 diabetes mellitus with hyperglycemia ([facility] [address] )    History of Cardiac arrest ([facility])    Tracheostomy in place ([facility] [address] )    COVID-19    CKD (chronic kidney disease) stage 3, GFR 30-59 ml/min ([facility])    Chronic heart failure with preserved ejection fraction [facility])         Past Medical History  Past Medical History:   Diagnosis Date    Anxiety     Aortic aneurysm ([facility])     Arthritis     Depression     Diabetes mellitus ([facility])     Fibromyalgia     GERD (gastroesophageal reflux disease)     GERD (gastroesophageal reflux disease)     H/O cardiovascular stress test 09/2018    no ischemia  EF 70%   H/O echocardiogram 01/2019    EF 60%  Mild LVH  trivial effusion   Hyperlipidemia     Hypertension     Migraines     Psychiatric disorder     anxiety    Uncontrolled hypertension 2/25/2015    Last Assessment & Plan:  BP today above goal <140/90, apparently asymptomatic  Prior BP elevations were attributed to not taking medication  Consider increased medication if persistent on f/u  Past Surgical History  Past Surgical History:   Procedure Laterality Date    BACK SURGERY      Lumbar epidural steroid injection    CARDIAC CATHETERIZATION N/A 10/22/2021    Procedure: Cardiac pci;  Surgeon: hSerrie Junior MD;  [facility]; Service: Cardiology    CARDIAC CATHETERIZATION  10/22/2021    Procedure: Cardiac catheterization;  Surgeon: Sherrie Junior MD;  [facility]; Service: Cardiology    CARDIAC CATHETERIZATION Left 10/27/2021    Procedure: Cardiac Left Heart Cath;  Surgeon: Doreen Villarreal MD;  [facility];   Service: Cardiology    CARDIAC CATHETERIZATION N/A 10/27/2021    Procedure: Cardiac pci;  Surgeon: Patti Mcleod Zenon Phillips MD;  [facility]; Service: Cardiology    CARDIAC CATHETERIZATION N/A 10/28/2021    Procedure: Cardiac pci;  Surgeon: Pao Jorgensen DO;  [facility]; Service: Cardiology    CARPAL TUNNEL RELEASE Left      SECTION      CHOLECYSTECTOMY      COLONOSCOPY      incomplete    COLONOSCOPY      EYE SURGERY      HYSTERECTOMY      Total    IR CHEST TUBE PLACEMENT  2022    IR CHEST TUBE PLACEMENT  2022    IR CHEST TUBE PLACEMENT  2022    LUNG DECORTICATION Right 2022    Procedure: DECORTICATION LUNG;  Surgeon: Jules Enciso MD;  [facility];  Service: Thoracic    OVARIAN CYST REMOVAL      PEG W/TRACHEOSTOMY PLACEMENT N/A 2021    Procedure: TRACHEOSTOMY WITH INSERTION PEG TUBE;  Surgeon: Adrienne Fung DO;  [facility];  Service: General    THORACOSCOPY VIDEO ASSISTED SURGERY (VATS) Right 2022    Procedure: THORACOSCOPY VIDEO ASSISTED SURGERY (VATS), RIGHT;  Surgeon: Jules Enciso MD;  [facility];  Service: Thoracic    TUBAL LIGATION      UPPER GASTROINTESTINAL ENDOSCOPY         Summary:   Pt presents with oral and pharyngeal swallow skills that appear [facility]  Pt intermittently coughing t/o meal though not immediately after drinking/eating  Pt reports increased feelings of secretions in chest  Pt was able to expectorate secretions through trach; secretions were judged to be opaque and dark  Pt denies difficulty chewing or swallowing as well as globus sensation  ST to f/u for 1-2x additional treatment session to ensure diet tolerance, monitor for s/s of aspiration/dysphagia/change in pulmonary status       Recommendations:   Diet: thin liquids and regular  Medications: as tolerated  Oral care: 3x/day with toothbrush and toothpaste  Supervision: independent  Aspiration Precautions/Compensatory Swallowing Strategies: upright position      Current Medical Status:  Pt is a 65 y o  female who presented to [facility] ED on   with sudden onset shortness of breath shortly after eating  Patient has recent history of right empyema and is status post VATS decortication on 22  She was discharged to rehab on 22 and completed antibiotic course on 22  She presented from rehab with shortness of breath  She is status post trach placement secondary to subglottic stenosis  Also has history of AFib and CHF  Per EMS, patient was hypoxic and in respiratory distress  This improved with nebs  In the ED, patient reports feeling back at baseline and denied shortness of breath at the time of my evaluation  She denies chest pain, no fever or chills  Incidentally tested positive for COVID 19 infection  She tested positive also in May  Past Medical History:  Please see H&P for details    Relevant Imagin2022: XR Chest IMPRESSION: Mild to moderate pulmonary vascular congestion  Small right pleural effusion  2022: CT Head without Contrast IMPRESSION: Consolidations within the lungs bilaterally which may represent infection  Recommend short-term follow-up chest CT scan in 3 months to evaluate for resolution  Bilateral pleural effusions  2022: MODESTO Kelli presented with s/s suggestive of minimal pharyngeal dysphagia in the presence of altered anatomy  (presence #6 cuffless trach tube, significant supraglottic edema, small osteophyte at C5-6) and physiology (reduced laryngeal rise)  Minimal food stasis identified today (not: pt disliked food with barium and took small bites and chewed thoroughly)    No pill stasis noted in pharynx or esophagus         Social/Education/Vocational Hx:  Pt resides with family    Swallow Information:   Current Risks for Dysphagia & Aspiration: history of dysphagia  Current Symptoms/Concerns: Upon admission, pt with shortness of breath shortly after eating  Current Diet: thin liquids and regular  Baseline Diet: thin liquids and regular Pt consumes medications: as tolerated    Positioning and Respiratory Status:  Position: upright in bed  Respiratory Status: trach collar    Oral Integrity:  Mucosa: moist and pink  Dentition:natural  Dependent on Oral Care: no    Oral Mechanism Exam:   Face: symmetrical  Lips: WFL  Tongue: WFL  Hard/Soft Palate:normal  Cough: volitional and weak  Weak cough due to open trach, however, pt able to expectorate secretions through trach  Secretions noted to be opaque and dark  Speech Characteristics:  Apraxia of Speech: none  Dysarthria: n/a  Vocal Quality: aphonic; pt unable to tolerate speaking valve secondary to history of glottic stenosis  Pt declined pen paper/low tech AAC to assist with communication breakdowns  Pt utilizing pantomine speech and gestures to communicate  Textures Assessed:  Soft trials of macaroni and cheese and chocolate cake were given to the patient during assessment  Clinician observed the following clinical s/s of dysphagia: [facility]    Regular trials of carrot coins and roast beef were given to the patient during assessment  Clinician observed the following clinical s/s of dysphagia: [facility]    Liquids Assessed: Thin liquids via cup and straw  Clinician observed the following clinical s/s of dysphagia: [facility]    Esophageal Concerns  None    Strategies Trialed  Postural Techniques:none  Maneuvers:none  Behavioral Strategies: none    Results Reviewed with: patient and RN     Dysphagia Goals:   1  Pt will tolerate regular solids with thin liquids without change in pulmonary status x72 hours

## 2022-08-25 NOTE — ASSESSMENT & PLAN NOTE
· POA, initially tested positive for COVID on 5/19/22 and again today 8/24/22  · Doubt current symptoms related to COVID in view of sudden onset with almost immediate resolution  · Will monitor per mild COVID pathway  · Start Remdesivir and Dexamethasone  · respi  protocol

## 2022-08-25 NOTE — ASSESSMENT & PLAN NOTE
Lab Results   Component Value Date    EGFR 51 08/25/2022    EGFR 44 08/24/2022    EGFR 30 08/18/2022    CREATININE 1 18 08/25/2022    CREATININE 1 34 (H) 08/24/2022    CREATININE 1 85 (H) 08/18/2022     · Baseline creatinine around 0 9-1 0 based on review of most recent admission  · Creatinine elevated at 1 34 today although improved from 1 85 on most recent check at the time of her discharge to rehab  · Will continue to monitor closely while on diuretics

## 2022-08-26 ENCOUNTER — APPOINTMENT (OUTPATIENT)
Dept: RADIOLOGY | Facility: HOSPITAL | Age: 57
DRG: 177 | End: 2022-08-26
Payer: COMMERCIAL

## 2022-08-26 PROBLEM — I82.409 DVT (DEEP VENOUS THROMBOSIS) (HCC): Status: ACTIVE | Noted: 2022-08-26

## 2022-08-26 LAB
ALBUMIN SERPL BCP-MCNC: 2.7 G/DL (ref 3.5–5)
ALP SERPL-CCNC: 111 U/L (ref 34–104)
ALT SERPL W P-5'-P-CCNC: 11 U/L (ref 7–52)
ANION GAP SERPL CALCULATED.3IONS-SCNC: 7 MMOL/L (ref 4–13)
APTT PPP: 42 SECONDS (ref 23–37)
APTT PPP: 56 SECONDS (ref 23–37)
APTT PPP: 90 SECONDS (ref 23–37)
AST SERPL W P-5'-P-CCNC: 11 U/L (ref 13–39)
BILIRUB SERPL-MCNC: 0.33 MG/DL (ref 0.2–1)
BUN SERPL-MCNC: 32 MG/DL (ref 5–25)
CALCIUM ALBUM COR SERPL-MCNC: 9.3 MG/DL (ref 8.3–10.1)
CALCIUM SERPL-MCNC: 8.3 MG/DL (ref 8.4–10.2)
CHLORIDE SERPL-SCNC: 108 MMOL/L (ref 96–108)
CO2 SERPL-SCNC: 26 MMOL/L (ref 21–32)
CREAT SERPL-MCNC: 1.08 MG/DL (ref 0.6–1.3)
CRP SERPL QL: 25.6 MG/L
ERYTHROCYTE [DISTWIDTH] IN BLOOD BY AUTOMATED COUNT: 18.5 % (ref 11.6–15.1)
FERRITIN SERPL-MCNC: 331 NG/ML (ref 8–388)
GFR SERPL CREATININE-BSD FRML MDRD: 57 ML/MIN/1.73SQ M
GLUCOSE SERPL-MCNC: 228 MG/DL (ref 65–140)
GLUCOSE SERPL-MCNC: 249 MG/DL (ref 65–140)
GLUCOSE SERPL-MCNC: 335 MG/DL (ref 65–140)
GLUCOSE SERPL-MCNC: 350 MG/DL (ref 65–140)
GLUCOSE SERPL-MCNC: 400 MG/DL (ref 65–140)
HCT VFR BLD AUTO: 26.8 % (ref 34.8–46.1)
HGB BLD-MCNC: 8.2 G/DL (ref 11.5–15.4)
INR PPP: 1.34 (ref 0.84–1.19)
IRON SATN MFR SERPL: 15 % (ref 15–50)
IRON SERPL-MCNC: 41 UG/DL (ref 50–170)
MCH RBC QN AUTO: 25.5 PG (ref 26.8–34.3)
MCHC RBC AUTO-ENTMCNC: 30.6 G/DL (ref 31.4–37.4)
MCV RBC AUTO: 84 FL (ref 82–98)
NT-PROBNP SERPL-MCNC: 8450 PG/ML
PLATELET # BLD AUTO: 458 THOUSANDS/UL (ref 149–390)
PMV BLD AUTO: 10.4 FL (ref 8.9–12.7)
POTASSIUM SERPL-SCNC: 4 MMOL/L (ref 3.5–5.3)
PROCALCITONIN SERPL-MCNC: 0.77 NG/ML
PROT SERPL-MCNC: 7.3 G/DL (ref 6.4–8.4)
PROTHROMBIN TIME: 16.8 SECONDS (ref 11.6–14.5)
RBC # BLD AUTO: 3.21 MILLION/UL (ref 3.81–5.12)
SODIUM SERPL-SCNC: 141 MMOL/L (ref 135–147)
TIBC SERPL-MCNC: 271 UG/DL (ref 250–450)
WBC # BLD AUTO: 9.32 THOUSAND/UL (ref 4.31–10.16)

## 2022-08-26 PROCEDURE — 71045 X-RAY EXAM CHEST 1 VIEW: CPT

## 2022-08-26 PROCEDURE — 99232 SBSQ HOSP IP/OBS MODERATE 35: CPT | Performed by: INTERNAL MEDICINE

## 2022-08-26 PROCEDURE — 85730 THROMBOPLASTIN TIME PARTIAL: CPT | Performed by: INTERNAL MEDICINE

## 2022-08-26 PROCEDURE — 82948 REAGENT STRIP/BLOOD GLUCOSE: CPT

## 2022-08-26 PROCEDURE — 85027 COMPLETE CBC AUTOMATED: CPT | Performed by: INTERNAL MEDICINE

## 2022-08-26 PROCEDURE — 83550 IRON BINDING TEST: CPT | Performed by: INTERNAL MEDICINE

## 2022-08-26 PROCEDURE — 94760 N-INVAS EAR/PLS OXIMETRY 1: CPT

## 2022-08-26 PROCEDURE — 83540 ASSAY OF IRON: CPT | Performed by: INTERNAL MEDICINE

## 2022-08-26 PROCEDURE — 99232 SBSQ HOSP IP/OBS MODERATE 35: CPT | Performed by: NURSE PRACTITIONER

## 2022-08-26 PROCEDURE — 94640 AIRWAY INHALATION TREATMENT: CPT

## 2022-08-26 PROCEDURE — 84145 PROCALCITONIN (PCT): CPT | Performed by: INTERNAL MEDICINE

## 2022-08-26 PROCEDURE — 86140 C-REACTIVE PROTEIN: CPT | Performed by: INTERNAL MEDICINE

## 2022-08-26 PROCEDURE — 85610 PROTHROMBIN TIME: CPT | Performed by: INTERNAL MEDICINE

## 2022-08-26 PROCEDURE — 82728 ASSAY OF FERRITIN: CPT | Performed by: INTERNAL MEDICINE

## 2022-08-26 PROCEDURE — 80053 COMPREHEN METABOLIC PANEL: CPT | Performed by: INTERNAL MEDICINE

## 2022-08-26 PROCEDURE — 83880 ASSAY OF NATRIURETIC PEPTIDE: CPT | Performed by: NURSE PRACTITIONER

## 2022-08-26 PROCEDURE — 99222 1ST HOSP IP/OBS MODERATE 55: CPT | Performed by: PHYSICIAN ASSISTANT

## 2022-08-26 RX ORDER — CEFTRIAXONE 1 G/50ML
1000 INJECTION, SOLUTION INTRAVENOUS EVERY 24 HOURS
Status: DISCONTINUED | OUTPATIENT
Start: 2022-08-26 | End: 2022-08-26

## 2022-08-26 RX ORDER — INSULIN GLARGINE 100 [IU]/ML
20 INJECTION, SOLUTION SUBCUTANEOUS
Status: DISCONTINUED | OUTPATIENT
Start: 2022-08-26 | End: 2022-08-27

## 2022-08-26 RX ORDER — DOXYCYCLINE HYCLATE 100 MG/1
100 CAPSULE ORAL EVERY 12 HOURS
Status: DISCONTINUED | OUTPATIENT
Start: 2022-08-26 | End: 2022-08-26

## 2022-08-26 RX ORDER — INSULIN LISPRO 100 [IU]/ML
8 INJECTION, SOLUTION INTRAVENOUS; SUBCUTANEOUS
Status: DISCONTINUED | OUTPATIENT
Start: 2022-08-26 | End: 2022-08-27

## 2022-08-26 RX ORDER — INSULIN LISPRO 100 [IU]/ML
5 INJECTION, SOLUTION INTRAVENOUS; SUBCUTANEOUS
Status: DISCONTINUED | OUTPATIENT
Start: 2022-08-26 | End: 2022-08-26

## 2022-08-26 RX ADMIN — POLYETHYLENE GLYCOL 3350 17 G: 17 POWDER, FOR SOLUTION ORAL at 10:24

## 2022-08-26 RX ADMIN — METOPROLOL SUCCINATE 50 MG: 50 TABLET, EXTENDED RELEASE ORAL at 04:07

## 2022-08-26 RX ADMIN — ACETAMINOPHEN 975 MG: 325 TABLET ORAL at 04:07

## 2022-08-26 RX ADMIN — MIRTAZAPINE 7.5 MG: 15 TABLET, FILM COATED ORAL at 20:32

## 2022-08-26 RX ADMIN — DEXAMETHASONE SODIUM PHOSPHATE 6 MG: 4 INJECTION INTRA-ARTICULAR; INTRALESIONAL; INTRAMUSCULAR; INTRAVENOUS; SOFT TISSUE at 10:22

## 2022-08-26 RX ADMIN — INSULIN LISPRO 5 UNITS: 100 INJECTION, SOLUTION INTRAVENOUS; SUBCUTANEOUS at 12:11

## 2022-08-26 RX ADMIN — HEPARIN SODIUM 16 UNITS/KG/HR: 10000 INJECTION, SOLUTION INTRAVENOUS at 12:12

## 2022-08-26 RX ADMIN — TICAGRELOR 90 MG: 90 TABLET ORAL at 10:23

## 2022-08-26 RX ADMIN — INSULIN LISPRO 8 UNITS: 100 INJECTION, SOLUTION INTRAVENOUS; SUBCUTANEOUS at 16:35

## 2022-08-26 RX ADMIN — LEVALBUTEROL HYDROCHLORIDE 0.63 MG: 0.63 SOLUTION RESPIRATORY (INHALATION) at 17:14

## 2022-08-26 RX ADMIN — INSULIN LISPRO 4 UNITS: 100 INJECTION, SOLUTION INTRAVENOUS; SUBCUTANEOUS at 21:44

## 2022-08-26 RX ADMIN — ATORVASTATIN CALCIUM 40 MG: 40 TABLET, FILM COATED ORAL at 16:31

## 2022-08-26 RX ADMIN — HEPARIN SODIUM 2000 UNITS: 1000 INJECTION INTRAVENOUS; SUBCUTANEOUS at 20:30

## 2022-08-26 RX ADMIN — PREGABALIN 75 MG: 75 CAPSULE ORAL at 10:22

## 2022-08-26 RX ADMIN — INSULIN GLARGINE 20 UNITS: 100 INJECTION, SOLUTION SUBCUTANEOUS at 21:46

## 2022-08-26 RX ADMIN — PANTOPRAZOLE SODIUM 40 MG: 40 TABLET, DELAYED RELEASE ORAL at 04:08

## 2022-08-26 RX ADMIN — INSULIN LISPRO 2 UNITS: 100 INJECTION, SOLUTION INTRAVENOUS; SUBCUTANEOUS at 10:28

## 2022-08-26 RX ADMIN — REMDESIVIR 100 MG: 100 INJECTION, POWDER, LYOPHILIZED, FOR SOLUTION INTRAVENOUS at 12:06

## 2022-08-26 RX ADMIN — HEPARIN SODIUM 4000 UNITS: 1000 INJECTION INTRAVENOUS; SUBCUTANEOUS at 02:38

## 2022-08-26 RX ADMIN — INSULIN LISPRO 6 UNITS: 100 INJECTION, SOLUTION INTRAVENOUS; SUBCUTANEOUS at 16:36

## 2022-08-26 RX ADMIN — TICAGRELOR 90 MG: 90 TABLET ORAL at 20:32

## 2022-08-26 RX ADMIN — LIDOCAINE 5% 2 PATCH: 700 PATCH TOPICAL at 10:24

## 2022-08-26 RX ADMIN — AMIODARONE HYDROCHLORIDE 100 MG: 200 TABLET ORAL at 10:22

## 2022-08-26 RX ADMIN — INSULIN LISPRO 5 UNITS: 100 INJECTION, SOLUTION INTRAVENOUS; SUBCUTANEOUS at 10:28

## 2022-08-26 RX ADMIN — ESCITALOPRAM OXALATE 10 MG: 10 TABLET ORAL at 10:23

## 2022-08-26 RX ADMIN — METOPROLOL SUCCINATE 50 MG: 50 TABLET, EXTENDED RELEASE ORAL at 16:31

## 2022-08-26 NOTE — ASSESSMENT & PLAN NOTE
Lab Results   Component Value Date    HGBA1C 8 1 (H) 08/24/2022       Recent Labs     08/25/22  0727 08/25/22  1045 08/25/22  1610 08/25/22 2132   POCGLU 64* 219* 296* 343*     Lantus increased to 30 Units  Premeal insulin increased to 10 U TID        Blood Sugar Average: Last 72 hrs:  · (P) 545 [id number]   · Has been uncontrolled based on most recent hemoglobin A1c results  · She will be continued on basal insulin per most recent discharge doses  · Accu-Cheks a c  HS with correctional scale insulin

## 2022-08-26 NOTE — PROGRESS NOTES
Progress Note - Pulmonary   Parisa Guerra 65 y o  female MRN: [id number]  Unit/Bed#: S -01 Encounter: [id number]      Assessment/Plan:         1  Acute on chronic hypoxic respiratory failure secondary to COVID 19  1  Titrate oxygen as needed to maintain SpO2 >88%  2  Pulmonary toilet: IS, cough deep breathe  3  Side lying and prone position  4  Current O2 needs 10L ATC  5  Baseline O2 needs 10 L ATC  2  COVID 19   1  Imaging: chest CT 8/24/22-diffuse emphysematous, bilateral consolidations with bilateral pleural effusions  2  Recommendations  1  Would continue decadron at 6 mg daily   2  Currently at baseline O2 requirements-would not further initiate barcitinib  3  Check BNP  3  Labs  1  CRP 31--36 1--25 6  2  Procalcitonin 0 11--1 00  3  Troponin  WNL   4  D-dimer 6 23  4  Medications  1  lipitor  2  Anticoagulation heparin drip  3  Remdesivir day 2/5  4  decadron dosing 6mg daily day 2/10  5  Diuretics follow BNP and consider dosing today-will defer to cardiology   6  Baricitinib NA  7  Antibiotics rocephine/doxycycline day 1  3  Recent empyema s/t VATS decortication with prolonged ABX treatment  1  At this time agree with continuing current ABX management, likely colonized with pseudomonas  2  Continue to tread PCT and WBC-with lack of improvement may consider repeat sputum culture or change in ABX based on previous sputum results  3  Follow aspiration precautions and speech recommendations  4  DM2  5  CKD 3  6  CHF-appears euvolemic  1  Daily weights  2  Strict I &O  7  History of cardiac arresst          Subjective:     Kelli was seen laying in bed comfortably upon entering the room  Reports her breathing is at baseline  She uses ATC at 10 L at baseline  Denies: chest pain, fevers, chills, SOB or change in cough  Only complaint is fatigue    Objective:         Vitals: Blood pressure 151/84, pulse 64, temperature 97 6 °F (36 4 °C), resp  rate 17, SpO2 97 %  , 10 L ATC, There is no height or weight on file to calculate BMI  Intake/Output Summary (Last 24 hours) at 8/26/2022 0957  Last data filed at 8/26/2022 0137  Gross per 24 hour   Intake 120 ml   Output 2150 ml   Net -2030 ml         Physical Exam  Gen: Awake, alert, oriented x 3, no acute distress  HEENT: Mucous membranes moist, no oral lesions, no thrush, tracheostomy in place with ATC  NECK: no accessory muscle use, JVP not elevated  Cardiac: Regular, single S1, single S2, no murmurs, no rubs, no gallops  Lungs: decreased clear breath sounds  Abdomen: normoactive bowel sounds, soft nontender, nondistended, no rebound or rigidity, no guarding  Extremities: no cyanosis, no clubbing, no edema    Labs: I have personally reviewed pertinent lab results  , CBC:   Lab Results   Component Value Date    WBC 9 32 08/26/2022    HGB 8 2 (L) 08/26/2022    HCT 26 8 (L) 08/26/2022    MCV 84 08/26/2022     (H) 08/26/2022    MCH 25 5 (L) 08/26/2022    MCHC 30 6 (L) 08/26/2022    RDW 18 5 (H) 08/26/2022    MPV 10 4 08/26/2022   , CMP:   Lab Results   Component Value Date    SODIUM 141 08/26/2022    K 4 0 08/26/2022     08/26/2022    CO2 26 08/26/2022    BUN 32 (H) 08/26/2022    CREATININE 1 08 08/26/2022    CALCIUM 8 3 (L) 08/26/2022    AST 11 (L) 08/26/2022    ALT 11 08/26/2022    ALKPHOS 111 (H) 08/26/2022    EGFR 57 08/26/2022     Imaging and other studies: I have personally reviewed pertinent films in PACS  CXR 8/26/22    Pulmonary vascular congestion with bilateral pleural effusions    NEELAM Guillen

## 2022-08-26 NOTE — CASE MANAGEMENT
Case Management Assessment & Discharge Planning Note    Patient name Sylvia Ugarte  Location S /S -01 MRN [id number]  : 1965 Date 2022       Current Admission Date: 2022  Current Admission Diagnosis:Acute Respiratory insufficiency on chronic hypoxic respiratory failure   Patient Active Problem List    Diagnosis Date Noted    DVT (deep venous thrombosis) ([facility] [address] [facility] ) 2022    Empyema lung, Right 2022    Chest wall wound infection 2022    Chronic heart failure with preserved ejection fraction ([address] ) 2022    Pain at Chest tube insertion site    2022    Acute Respiratory insufficiency on chronic hypoxic respiratory failure 2022    Leukocytosis 2022    Hyponatremia 2022    Chronic kidney disease 2022    Hemoptysis 2022    Hypomagnesemia 2022    Chest pain 2022    Moderate protein-calorie malnutrition ([address] ) 2022    Thoracic aortic aneurysm, ruptured ([facility] [address] [facility] ) 2022    Shortness of breath 2022    Prolonged Q-T interval on ECG 2022    COVID-19 2022    Elevated troponin 2022    Anemia 2022    Insomnia secondary to anxiety 2022    Chronic hypoxemic respiratory failure ([facility] [address] [facility] ) 03/15/2022    Tracheostomy in place [facility]) 2022    Subglottic stenosis 2022    CAD (coronary artery disease) 2021    Urinary retention 2021    Cerebral aneurysm 2021    Cerebral vascular accident ([facility] [address] [facility] ) 2021    History of Cardiac arrest ([address] ) 2021    A-fib ([address] ) 10/30/2021    History of Ventricular tachycardia ([address] ) 10/27/2021    MODESTO (acute kidney injury) ([facility] [address] [facility] ) 10/24/2021    Cellulitis of left lower extremity 2021    Hypoglycemia 2021    Polypharmacy 2021    Trigger finger, right middle finger 2021    Trigger finger, right ring finger 2021    Diarrhea 2021    Nasal vestibulitis 2021    Orthopnea 01/14/2021    CHANTALE (obstructive sleep apnea) 01/14/2021    Palpitations 11/17/2020    Abscess 10/11/2020    Bacterial vaginosis 07/09/2020    Urinary tract infection with hematuria 02/03/2020    Epigastric pain 07/02/2019    Thoracic aortic aneurysm without rupture (HCC) 08/12/2018    Hypokalemia 08/11/2018    Abnormal urinalysis 08/11/2018    Vaginal discharge 04/12/2018    Yeast vaginitis 04/12/2018    Recurrent vaginitis 04/12/2018    Cardiac murmur 12/15/2017    Primary insomnia 12/15/2017    Anxiety 07/26/2017    Depression, recurrent ([facility] ) 07/26/2017    Retinal hemorrhage due to secondary diabetes ([facility] ) 07/26/2017    Fibromyalgia 07/26/2017    Hypertension 07/26/2017    Hypoestrogenism 07/26/2017    Neuropathy 07/26/2017    Chronic pain disorder 06/05/2017    Medically complex patient 06/05/2017    Change in bowel habit 04/24/2017    Screening for colon cancer 12/05/2016    Cough 02/15/2016    Non compliance with medical treatment 01/27/2016    Dizziness 01/26/2016    Gastroesophageal reflux disease with esophagitis 01/26/2016    Vertigo 01/26/2016    Vertebral body hemangioma 08/21/2015    Hemangioma of bone 07/30/2015    Right-sided thoracic back pain 07/23/2015    Thoracic radiculitis 07/23/2015    Carpal tunnel syndrome of left wrist 06/26/2015    Tobacco abuse 06/26/2015    Type 2 diabetes mellitus with hyperglycemia ([facility] ) 06/09/2015    Hyperlipidemia associated with type 2 diabetes mellitus ([facility] ) 05/06/2015    Noncompliance with diet and medication regimen 05/06/2015    Shingles 03/25/2015    Diabetic peripheral neuropathy ([facility] [address] ) 02/25/2015    Low back pain 02/25/2015    Lumbar radiculopathy 02/25/2015    Overweight 02/25/2015    Sciatica of left side 02/25/2015      LOS (days): 2  Geometric Mean LOS (GMLOS) (days): 2 10  Days to GMLOS:0     OBJECTIVE:  PATIENT READMITTED TO HOSPITAL  Risk of Unplanned Readmission Score: 71 28         Current admission status: Inpatient       Preferred Pharmacy:   [address], [address] [facility], [address]  [address] [facility]  [address]  Phone: [phone] Fax: [address], [address] [facility]  [address] [facility]  [place] [address]  Phone: [phone] Fax: [phone]    [place] [address] [address], [place] [address] [facility] [address]  [facility] [address] [facility] [address]  Phone: [phone] Fax: [address] [facility], [facility] [address] [facility] [address] [facility] [address] [facility] [address] [address]  Phone: [phone] Fax: [phone]    Primary Care Provider: Carito Issa MD    Primary Insurance: Kevin Costello [facility]  Secondary Insurance: Char Malone    ASSESSMENT:  [address] [facility], [address] Representative - Son   Primary Phone: [phone] (Mobile)                         Readmission Root Cause  30 Day Readmission: Yes  Who directed you to return to the hospital?: Family, Self  Did you understand whom to contact if you had questions or problems?: Yes  Did you get your prescriptions before you left the hospital?: Yes  Were you able to get your prescriptions filled when you left the hospital?: Yes  Did you take your medications as prescribed?: Yes  Were you able to get to your follow-up appointments?: Yes  During previous admission, was a post-acute recommendation made?: Yes  What post-acute resources were offered?: STR  Patient was readmitted due to: Acute respiratory distress  Positive Covid   Left LE DVT  Action Plan: Medical management, anticoagulant PT/OT    Patient Information  Admitted from[de-identified] Home  Mental Status: Alert  During Assessment patient was accompanied by: Not accompanied during assessment  Assessment information provided by[de-identified] Son  Primary Caregiver: Self  Caregiver's Name[de-identified] Vena Nurse, patient's son  Caregiver's Relationship to Patient[de-identified] Family Member  Caregiver's Telephone Number[de-identified] See face sheet  Support Systems: Son, Family members, Friend, [address] [facility] of Residence: [address] [facility] do you live in?: [place] entry access options   Select all that apply : Stairs  Number of steps to enter home : 4  Do the steps have railings?: Yes  Type of Current Residence: 2 story home  Upon entering residence, is there a bedroom on the main floor (no further steps)?: No  A bedroom is located on the following floor levels of residence (select all that apply):: 2nd Floor  Upon entering residence, is there a bathroom on the main floor (no further steps)?: Yes  Number of steps to 2nd floor from main floor: One Flight  In the last 12 months, was there a time when you were not able to pay the mortgage or rent on time?: No  In the last 12 months, how many places have you lived?: 1  In the last 12 months, was there a time when you did not have a steady place to sleep or slept in a shelter (including now)?: Yes  Homeless/housing insecurity resource given?: N/A  Living Arrangements: Lives w/ Son, Lives w/ Extended Family  Is patient a ?: No    Activities of Daily Living Prior to Admission  Functional Status: Independent  Completes ADLs independently?: Yes  Ambulates independently?: Yes  Does patient use assisted devices?: No  Does patient currently own DME?: Yes  What DME does the patient currently own?: Portable Oxygen tanks ([employer])  Does patient have a history of Outpatient Therapy (PT/OT)?: No  Does the patient have a history of Short-Term Rehab?: Yes ([facility])  Does patient have a history of HHC?: Yes ([facility])  Does patient currently have [address]?: No         Patient Information Continued  Income Source: SSI/SSD  Does patient have prescription coverage?: Yes  Within the past 12 months, you worried that your food would run out before you got the money to buy more : Sometimes true  Within the past 12 months, the food you bought just didn't last and you didn't have money to get more : Sometimes true  Food insecurity resource given?: Yes (Discussed MOW with son but they are going to be evicted from the house by end of month )  Does patient receive dialysis treatments?: No  Does patient have a history of substance abuse?: No  Does patient have a history of Mental Health Diagnosis?: Yes (Anxiety and Depression)  Is patient receiving treatment for mental health?: Yes (On Ativan and Lexapro)  Has patient received inpatient treatment related to mental health in the last 2 years?: No         Means of Transportation  Means of Transport to Appts[de-identified] Family transport  In the past 12 months, has lack of transportation kept you from medical appointments or from getting medications?: No  In the past 12 months, has lack of transportation kept you from meetings, work, or from getting things needed for daily living?: No  Was application for public transport provided?: N/A        DISCHARGE DETAILS:    Discharge planning discussed with[de-identified] CM spoke to son, Jana Bautista and Milagros xie  Freedom of Choice: Yes  Comments - Freedom of Choice: CM discussed discharge options, pending care team recommendations  Patient's son hopes she'll meet criteria to go to rehab as they will be evicted from their home by end of this month  CM spoke to , Milagros, to inquire on housing post hospitalization and sister is going to meet with siblings and discuss who can take patient to their home post hospitalization or rehab  Son states patient was at rehab and CM review chart and saw she was at [facility] after her most recent admission  CM contacted [facility] and was informed she was there from 8/18 to 18/24 and discharged home  Son states he, his girlfriend and patient were able to do trach care as they were instructed from [address] [facility]  Oxygen equipments from [address]  Patient ambulated without AD at home   Cm will continue to follow up regarding discharge recommendations per care team as well as follow up with family  CM contacted family/caregiver?: Yes (Son and sister Joslyn Menjivar)  Were Treatment Team discharge recommendations reviewed with patient/caregiver?: Yes  Did patient/caregiver verbalize understanding of patient care needs?: Yes  Were patient/caregiver advised of the risks associated with not following Treatment Team discharge recommendations?: Yes    Contacts  Patient Contacts: Bam Hugh (son) and Vinod Rickey (patient's sister)  Contact Method: Phone  Phone Number: See face sheet              Other Referral/Resources/Interventions Provided:  Referral Comments: CM discussed discharge options, pending care team recommendations  Patient's son hopes she'll meet criteria to go to rehab as they will be evicted from their home by end of this month  CM spoke to sister, Joslyn Menjivar, to inquire on housing post hospitalization and sister is going to meet with siblings and discuss who can take patient to their home post hospitalization or rehab  Son states patient was at rehab and CM review chart and saw she was at [address] after her most recent admission  CM contacted [facility] and was informed she was there from 8/18 to 18/24 and discharged home  Son states he, his girlfriend and patient were able to do trach care as they were instructed from [address] [facility]  Oxygen equipments from [address]  Patient ambulated without AD at home  Cm will continue to follow up regarding discharge recommendations per care team as well as follow up with family

## 2022-08-26 NOTE — UTILIZATION REVIEW
Inpatient Admission Authorization Request   NOTIFICATION OF INPATIENT ADMISSION/INPATIENT AUTHORIZATION REQUEST   SERVICING FACILITY:   [address]  Tax ID: [id number]  NPI: [id number]  [facility]patient [facility]  [address]  Place of Service Code: 24     ATTENDING PROVIDER:  Attending Name and NPI#: Anitra Ford Md [[id number]]  Address: [address]  Phone: [phone]     UTILIZATION REVIEW CONTACT:  Whit Krause Utilization   Network Utilization Review Department  Phone: [phone]  Fax: [phone]  Email: [email or web address]     PHYSICIAN ADVISORY SERVICES:  FOR [id number] REVIEW - MEDICAL NECESSITY DENIAL  Phone: [phone]  Fax: [phone]  Email: [email or web address]     TYPE OF REQUEST:  Inpatient Status     ADMISSION INFORMATION:  ADMISSION DATE/TIME: 8/24/22  2:09 PM  PATIENT DIAGNOSIS CODE/DESCRIPTION:  Respiratory distress [R06 03]  Hypoxia [R09 02]  COVID-19 [U07 1]  DISCHARGE DATE/TIME: No discharge date for patient encounter  IMPORTANT INFORMATION:  Please contact Whit Krause directly with any questions or concerns regarding this request  Department voicemails are confidential     Send requests for admission clinical reviews, concurrent reviews, approvals, and administrative denials due to lack of clinical to fax [phone]

## 2022-08-26 NOTE — CONSULTS
Medical Oncology/Hematology Consult Note  Anisha Bhatt, female, 65 y o , 1965,  S /S -01, [id number]     Reason for admission:  Acute respiratory insufficiency on chronic hypoxic respiratory failure  Reason for consultation: LE DVT      ASSESSMENT AND PLAN:     1  Left lower extremity DVT  2  Acute respiratory insufficiency on chronic hypoxic respiratory failure  3  COVID-19  Patient was brought to hospital from rehabilitation for evaluation of shortness of breath  Recent hospital admission for empyema s/p VATS  Has tracheostomy for hx subglottic stenosis  Per Pulmonary suspect acute hypoxic respiratory insufficiency is multifactorial with setting of testing positive for COVID-19, recent empyema s/p VATS/decortication, possible aspiration, volume overload  Doppler demonstrated LLE focal chronic versus subacute nonocclusive deep vein thrombosis soleal veins at the mid calf  Denies any history of DVT, family hx clotting disorders, personal hx malignancy  Family hx of malignancy sister with renal cancer  Unclear timing of onset or provoking incidences, patient did have COVID-19 in May, did have prolonged hospitalization recently  With location of the DVT although distal,  D-dimer elevated and patient's Eliquis was discontinued and placed on heparin drip  Denies ever having unilateral leg swelling or pain to LLE  Would favor 3 months of anticoagulation therapy  Difficult to determine if this was Eliquis failure as the readings suggest this is a chronic VTE, patient has been on Eliquis for close to a year  Would recommend transition to other agent such as lovenox, pradaxa, arixtra, or coumadin upon discharge with cardiology recommendations for adequate coverage in afib  Would not recommend xarelto as MOA like eliquis  Patient understands and is in agreement with this plan  Thank you for the opportunity to participate in this patient's care      Interval History: Patient feeling well today with no acute complaints  History of present illness:  Patient is a 60-year-old female with past medical history significant for GERD, HTN, anxiety, depression, arthritis,fibromyalgia, complicated ventilator course, 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, afib on eliquis, CHF, Hx multiple cardiac arrests, presented to hospital with shortness of breath noted at rehab facility  Recent admission 08/06/2022 for right empyema s/p VATS procedure  Covid Status:Positive 8/24/22, 5/2022    Smoking since early teens at least 1ppd, cessation past October  No exogenous estrogen/progesterone use  Residing at rehabilitation facility  Was on eliquis PTA  Review of Systems:   Review of Systems   Constitutional: Negative for chills and fever  HENT: Negative for ear pain and sore throat  Eyes: Negative for pain and visual disturbance  Respiratory: Negative for cough and shortness of breath  Cardiovascular: Negative for chest pain, palpitations and leg swelling  Gastrointestinal: Negative for abdominal pain, constipation, diarrhea and vomiting  Genitourinary: Negative for dysuria and hematuria  Musculoskeletal: Negative for arthralgias and back pain  Skin: Negative for color change and rash  No LE unilateral leg edema, non tender L calf region   Neurological: Negative for seizures and syncope  All other systems reviewed and are negative  PHYSICAL EXAM:    /84   Pulse 64   Temp 97 6 °F (36 4 °C)   Resp 17   SpO2 97%     Physical Exam  Vitals and nursing note reviewed  Constitutional:       General: She is not in acute distress  Appearance: Normal appearance  She is not ill-appearing  HENT:      Head: Normocephalic and atraumatic  Eyes:      General: No scleral icterus  Cardiovascular:      Rate and Rhythm: Normal rate  Pulses: Normal pulses  Pulmonary:      Effort: Pulmonary effort is normal       Breath sounds: Normal breath sounds  Comments: Tracheostomy    Musculoskeletal:      Cervical back: Normal range of motion and neck supple  Right lower leg: No edema  Left lower leg: No edema  Skin:     General: Skin is warm and dry  Coloration: Skin is not pale  Findings: No bruising  Neurological:      Mental Status: She is alert and oriented to person, place, and time  Psychiatric:         Mood and Affect: Mood normal          Thought Content:  Thought content normal          Judgment: Judgment normal          LABS:     Recent Results (from the past 48 hour(s))   HS Troponin I 4hr    Collection Time: 08/24/22  2:43 PM   Result Value Ref Range    hs TnI 4hr 19 "Refer to ACS Flowchart"- see link ng/L    Delta 4hr hsTnI 3 9 <20 ng/L   Fingerstick Glucose (POCT)    Collection Time: 08/24/22  6:28 PM   Result Value Ref Range    POC Glucose 142 (H) 65 - 140 mg/dl   Fingerstick Glucose (POCT)    Collection Time: 08/24/22  9:41 PM   Result Value Ref Range    POC Glucose 218 (H) 65 - 140 mg/dl   Basic metabolic panel    Collection Time: 08/25/22  4:36 AM   Result Value Ref Range    Sodium 143 135 - 147 mmol/L    Potassium 3 8 3 5 - 5 3 mmol/L    Chloride 110 (H) 96 - 108 mmol/L    CO2 28 21 - 32 mmol/L    ANION GAP 5 4 - 13 mmol/L    BUN 30 (H) 5 - 25 mg/dL    Creatinine 1 18 0 60 - 1 30 mg/dL    Glucose 67 65 - 140 mg/dL    Calcium 8 3 (L) 8 4 - 10 2 mg/dL    eGFR 51 ml/min/1 73sq m   CBC (With Platelets)    Collection Time: 08/25/22  4:36 AM   Result Value Ref Range    WBC 10 69 (H) 4 31 - 10 16 Thousand/uL    RBC 3 08 (L) 3 81 - 5 12 Million/uL    Hemoglobin 7 8 (L) 11 5 - 15 4 g/dL    Hematocrit 25 7 (L) 34 8 - 46 1 %    MCV 83 82 - 98 fL    MCH 25 3 (L) 26 8 - 34 3 pg    MCHC 30 4 (L) 31 4 - 37 4 g/dL    RDW 18 6 (H) 11 6 - 15 1 %    Platelets 146 (H) 797 - 390 Thousands/uL    MPV 9 9 8 9 - 12 7 fL   C-reactive protein    Collection Time: 08/25/22  4:36 AM   Result Value Ref Range    CRP 31 0 (H) <3 0 mg/L   Fingerstick Glucose (POCT)    Collection Time: 08/25/22  7:27 AM   Result Value Ref Range    POC Glucose 64 (L) 65 - 140 mg/dl   Fingerstick Glucose (POCT)    Collection Time: 08/25/22 10:45 AM   Result Value Ref Range    POC Glucose 219 (H) 65 - 140 mg/dl   D-dimer, quantitative    Collection Time: 08/25/22 10:54 AM   Result Value Ref Range    D-Dimer, Quant 6 23 (H) <0 50 ug/ml FEU   Procalcitonin    Collection Time: 08/25/22 10:54 AM   Result Value Ref Range    Procalcitonin 1 00 (H) <=0 25 ng/ml   C-reactive protein    Collection Time: 08/25/22 10:54 AM   Result Value Ref Range    CRP 36 1 (H) <3 0 mg/L   CBC    Collection Time: 08/25/22  3:09 PM   Result Value Ref Range    WBC 8 95 4 31 - 10 16 Thousand/uL    RBC 3 35 (L) 3 81 - 5 12 Million/uL    Hemoglobin 8 7 (L) 11 5 - 15 4 g/dL    Hematocrit 30 8 (L) 34 8 - 46 1 %    MCV 92 82 - 98 fL    MCH 26 0 (L) 26 8 - 34 3 pg    MCHC 28 2 (L) 31 4 - 37 4 g/dL    RDW 19 0 (H) 11 6 - 15 1 %    Platelets 538 (H) 641 - 390 Thousands/uL    MPV 10 1 8 9 - 12 7 fL   Fingerstick Glucose (POCT)    Collection Time: 08/25/22  4:10 PM   Result Value Ref Range    POC Glucose 296 (H) 65 - 140 mg/dl   Fingerstick Glucose (POCT)    Collection Time: 08/25/22  9:32 PM   Result Value Ref Range    POC Glucose 343 (H) 65 - 140 mg/dl   APTT    Collection Time: 08/26/22  1:30 AM   Result Value Ref Range    PTT 42 (H) 23 - 37 seconds   Iron Saturation %    Collection Time: 08/26/22  4:13 AM   Result Value Ref Range    Iron Saturation 15 15 - 50 %    TIBC 271 250 - 450 ug/dL    Iron 41 (L) 50 - 170 ug/dL   Ferritin    Collection Time: 08/26/22  4:13 AM   Result Value Ref Range    Ferritin 331 8 - 388 ng/mL   CBC and Platelet    Collection Time: 08/26/22  4:50 AM   Result Value Ref Range    WBC 9 32 4 31 - 10 16 Thousand/uL    RBC 3 21 (L) 3 81 - 5 12 Million/uL    Hemoglobin 8 2 (L) 11 5 - 15 4 g/dL    Hematocrit 26 8 (L) 34 8 - 46 1 %    MCV 84 82 - 98 fL    MCH 25 5 (L) 26 8 - 34 3 pg    MCHC 30 6 (L) 31 4 - 37 4 g/dL    RDW 18 5 (H) 11 6 - 15 1 %    Platelets 202 (H) 991 - 390 Thousands/uL    MPV 10 4 8 9 - 12 7 fL   C-reactive protein    Collection Time: 08/26/22  4:51 AM   Result Value Ref Range    CRP 25 6 (H) <3 0 mg/L   Comprehensive metabolic panel    Collection Time: 08/26/22  4:51 AM   Result Value Ref Range    Sodium 141 135 - 147 mmol/L    Potassium 4 0 3 5 - 5 3 mmol/L    Chloride 108 96 - 108 mmol/L    CO2 26 21 - 32 mmol/L    ANION GAP 7 4 - 13 mmol/L    BUN 32 (H) 5 - 25 mg/dL    Creatinine 1 08 0 60 - 1 30 mg/dL    Glucose 249 (H) 65 - 140 mg/dL    Calcium 8 3 (L) 8 4 - 10 2 mg/dL    Corrected Calcium 9 3 8 3 - 10 1 mg/dL    AST 11 (L) 13 - 39 U/L    ALT 11 7 - 52 U/L    Alkaline Phosphatase 111 (H) 34 - 104 U/L    Total Protein 7 3 6 4 - 8 4 g/dL    Albumin 2 7 (L) 3 5 - 5 0 g/dL    Total Bilirubin 0 33 0 20 - 1 00 mg/dL    eGFR 57 ml/min/1 73sq m   Procalcitonin    Collection Time: 08/26/22  4:51 AM   Result Value Ref Range    Procalcitonin 0 77 (H) <=0 25 ng/ml   Fingerstick Glucose (POCT)    Collection Time: 08/26/22  8:24 AM   Result Value Ref Range    POC Glucose 228 (H) 65 - 140 mg/dl   Fingerstick Glucose (POCT)    Collection Time: 08/26/22 11:06 AM   Result Value Ref Range    POC Glucose 335 (H) 65 - 140 mg/dl   APTT    Collection Time: 08/26/22 11:11 AM   Result Value Ref Range    PTT 90 (H) 23 - 37 seconds   Protime-INR    Collection Time: 08/26/22 11:11 AM   Result Value Ref Range    Protime 16 8 (H) 11 6 - 14 5 seconds    INR 1 34 (H) 0 84 - 1 19       XR chest portable    Result Date: 8/26/2022  Narrative: CHEST INDICATION:   Shortness of breath  COMPARISON:  8/24/2022 EXAM PERFORMED/VIEWS:  XR CHEST PORTABLE  5:56 AM FINDINGS:  Tracheostomy tube in stable position  The heart remains moderately enlarged  There are some vascular and interstitial prominence again noted suggesting volume overload  There is some subsegmental atelectasis identified in the right mid and both lower lungs    There may be trace bilateral effusions  No pneumothorax identified  Osseous structures appear within normal limits for patient age  Impression: 1  Relatively stable examination with cardiomegaly and vascular and interstitial prominence as well as small bilateral effusions likely reflecting volume overload  2   Mild bibasilar subsegmental atelectasis without change  Workstation performed: [id number]     XR chest 1 view portable    Result Date: 8/24/2022  Narrative: CHEST INDICATION:   sob  Patient has suspected COVID-19  COMPARISON:  Chest radiograph dated 8/10/2022  EXAM PERFORMED/VIEWS:  XR CHEST PORTABLE FINDINGS: Cardiomediastinal silhouette is stably enlarged  Mild to moderate pulmonary vascular congestion  Small right pleural effusion  No pneumothorax  Osseous structures appear within normal limits for patient age  Impression: Mild to moderate pulmonary vascular congestion  Small right pleural effusion  Workstation performed: [id number]     XR chest portable    Result Date: 8/8/2022  Narrative: CHEST INDICATION:   increased tracheal secretions  COMPARISON:  08/06/2022 EXAM PERFORMED/VIEWS:  XR CHEST PORTABLE Images: 2 FINDINGS: Cardiomediastinal silhouette appears enlarged  Endotracheal tube in satisfactory position with its tip 6 cm above the travis  2 right chest tubes, one at the lung bases and one terminating in the lung apex  Atelectasis at the right lung base  Increased parenchymal density at the left lung base may represent infiltrate, atelectasis or fluid  No pneumothorax  Osseous structures appear within normal limits for patient age  Impression: 1   2 right chest tubes  Atelectasis at the right lung base  No pneumothorax  2   Increased density in the retrocardiac region may represent infiltrate, atelectasis and/or fluid  Workstation performed: [id number]     XR chest portable    Result Date: 8/3/2022  Narrative: CHEST INDICATION:   empyema s/p chest tube insertion   COMPARISON: Radiograph dated 731 2  CT dated 222  EXAM PERFORMED/VIEWS:  XR CHEST PORTABLE FINDINGS:  Pigtail pleural drainage catheter projecting at the basilar right hemithorax  Tracheostomy tip projects at the upper trachea at the level of the clavicular heads  Interval decrease in consolidations in the right midlung field and right base  Tiny left effusion with associated basilar atelectasis  Heart is enlarged but unchanged  Pulmonary vasculature is indistinct but not frankly cephalized  Bones are unremarkable  Impression: Improving right midlung field and bibasilar consolidation and effusion  Trace left pleural effusion persists  Stable position of right basilar pleural drainage catheter and tracheostomy  Workstation performed: [id number]     XR chest portable    Result Date: 8/1/2022  Narrative: CHEST INDICATION:   Respiratory failure  COMPARISON:  7/26/2022, CT chest 7/8/2022 EXAM PERFORMED/VIEWS:  XR CHEST PORTABLE FINDINGS:  Tracheostomy tube projects at the level of the clavicles  Cardiac silhouette is probably mildly enlarged, right heart border is not as sharply defined as before  Question mild pulmonary venous congestion versus artifactual accentuation by low lung volumes  Streaky right basilar opacity favored to represent atelectasis  Right pigtail thoracostomy tube  No pneumothorax  Small component of right pleural effusion suspected which may be partially loculated  Osseous structures appear within normal limits for patient age  Impression: Cardiomegaly with question of mild pulmonary venous congestion versus accentuation from hypoinflation  Streaky right base atelectasis  Small component of right pleural effusion suspected which may be partially loculated  Indwelling right thoracostomy tube without pneumothorax  Workstation performed: [id number]     XR chest pa & lateral    Result Date: 8/10/2022  Narrative: CHEST INDICATION:   s/p R CT basilar  COMPARISON:  August 9, 2022   EXAM PERFORMED/VIEWS:  XR CHEST PA & LATERAL FINDINGS:  A tracheostomy tube remains in satisfactory position  A right-sided chest tube has been removed since yesterday  The heart is enlarged  Pulmonary vascular congestion is improved  A very small right apical pneumothorax is now present  There is blunting of the right lateral costophrenic sulcus  Subsegmental atelectasis is present in the right lower lobe  The left lung is clear    Osseous structures appear within normal limits for patient age  Impression: 1  Small right apical pneumothorax, developing since 8/9/2022  2   Small amount of right basilar pleural fluid or thickening, unchanged  3   Right lower lobe subsegmental atelectasis  4   Improvement of pulmonary vascular congestion  The study was marked in [facility] for immediate notification  Workstation performed: [id number]     XR chest pa & lateral    Result Date: 8/9/2022  Narrative: CHEST INDICATION:   CT on WS  COMPARISON:  8/7/2022 EXAM PERFORMED/VIEWS:  XR CHEST PA & LATERAL FINDINGS: Tracheostomy tube and 2 right thoracotomy tubes remain  Heart shadow is mildly enlarged but unchanged from prior exam  Mild vascular congestion, right pleural effusion, persistent decreased bibasilar atelectasis  No pneumothorax  Osseous structures appear within normal limits for patient age  Impression: No pneumothorax  No change right thoracotomy tube or tracheostomy tube Mild cardiomegaly  Mild vascular congestion Right pleural effusion Decreased bibasilar atelectasis Workstation performed: [id number]     CT chest without contrast    Result Date: 8/24/2022  Narrative: CT CHEST WITHOUT IV CONTRAST INDICATION:   Dyspnea, chronic, chest wall or pleura disease suspected shortness of breath, extensive pulmonary hx including empyema s/p VATS in recent past, evaluate for pulm cause of hypoxia in trached patient  COMPARISON:  None   TECHNIQUE: CT examination of the chest was performed without intravenous contrast  Axial, sagittal, and coronal 2D reformatted images were created from the source data and submitted for interpretation  Radiation dose length product (DLP) for this visit:  240 mGy-cm   This examination, like all CT scans performed in the [facility], was performed utilizing techniques to minimize radiation dose exposure, including the use of iterative reconstruction and automated exposure control  FINDINGS: LUNGS:  Diffuse emphysematous changes within the lungs  Consolidations in the lungs are seen  Tracheostomy tube is seen  PLEURA:  Bilateral pleural effusions are visualized  HEART/GREAT VESSELS: The heart is enlarged  No thoracic aortic aneurysm  Pericardial effusion is seen  MEDIASTINUM AND REECE:  Mediastinal lymph nodes measuring up to 1 cm are seen  CHEST WALL AND LOWER NECK:  Unremarkable  VISUALIZED STRUCTURES IN THE UPPER ABDOMEN:  Unremarkable  OSSEOUS STRUCTURES:  No acute fracture or destructive osseous lesion  Impression: Consolidations within the lungs bilaterally which may represent infection  Recommend short-term follow-up chest CT scan in 3 months to evaluate for resolution  Bilateral pleural effusions  The study was marked in [facility] for immediate notification  Workstation performed: [id number]     CT chest wo contrast    Result Date: 8/2/2022  Narrative: CT CHEST WITHOUT IV CONTRAST INDICATION:   Empyema Empyema, blood draining from chest tube  COMPARISON:  CT chest 7/28/2022  TECHNIQUE: CT examination of the chest was performed without intravenous contrast   Axial, sagittal, and coronal 2D reformatted images were created from the source data and submitted for interpretation  Radiation dose length product (DLP) for this visit:  544 mGy-cm   This examination, like all CT scans performed in the [facility], was performed utilizing techniques to minimize radiation dose exposure, including the use of iterative reconstruction and automated exposure control   FINDINGS: LARGE AIRWAYS: Tracheostomy tube in place at the level of the thoracic inlet  The major airways are patent  LUNGS:  Atelectasis in the right lower lobe secondary to effusion, with improved aeration since prior study  Resolved inflammatory groundglass opacity in the left upper lobe, new small inflammatory groundglass opacities in the left lower lobe (series 3/70,75)  Mild background interstitial edema  PLEURA:  Interval placement of a pigtail pleural drainage catheter into the posterolateral right hemithorax along the right lower lobe, with decreased size of right basilar pleural effusion, now mild to moderate in volume  There is slightly increased density dependently (25-30 HU) compatible with a small amount of blood products  A loculated component of effusion more superiorly in the posterior medial right chest is unchanged (3/52)  Trace left pleural effusion, stable  HEART: Moderate cardiomegaly  Moderate coronary artery calcification  VESSELS: There is fusiform ectasia of the ascending thoracic aorta measuring up to 42 mm, unchanged  Recommendation is for follow-up low radiation dose chest CT in one year  Mildly dilated main pulmonary artery measuring 33 mm  MEDIASTINUM AND REECE:  Persistent mild mediastinal lymphadenopathy is overall unchanged (e g  a precarinal node measuring 12 mm in short axis on 3/50), likely reactive  CHEST WALL AND LOWER NECK:   Mildly improved appearance of infiltration overlying the right pectoralis major muscle (2/26) at site of previous catheter  Diffusely enlarged thyroid  VISUALIZED STRUCTURES IN THE UPPER ABDOMEN:  Cholecystectomy  Splenomegaly measuring 14 5 cm  Subcentimeter cyst or hemangioma in the right hepatic lobe unchanged from prior studies  BONES:  Mild multilevel spondylosis  Vertebral body height and alignment are maintained  Hemangioma in the T6 vertebral body  Old healed sternal fracture, bilateral rib fractures  Impression: 1    Decreased size of a right-sided pleural effusion and improved aeration of the right lower lobe post placement of a right pleural drainage catheter, with unchanged appearance of a loculated component of the effusion superomedially  There is slightly increased density of the effusion compatible with a small amount of blood products  2   Appearance of new inflammatory groundglass opacities in the left lower lobe  3   Mild background interstitial edema is unchanged  The study was marked in [employer] for significant notification  Workstation performed: [id number]     CT chest wo contrast    Result Date: 7/28/2022  Narrative: CT CHEST WITHOUT IV CONTRAST INDICATION: "Lung/mediastinal abscess, Empyema abscess? pus from chest tube site   " Per my review of the medical record, the patient has a history of COPD, MI and cardiac arrest resulting in tracheostomy  Presented approximately 10 days ago with spontaneous right pneumothorax, possibly secondary to coughing, for which a pigtail catheter was placed, now removed  COMPARISON:  Chest radiograph from 7/26/2022, chest CT from 5/19/2022, 2/23/2022, and 11/14/2021  TECHNIQUE: Chest CT without intravenous contrast   Axial, sagittal, coronal 2D reformats and coronal MIPS from source data  Radiation dose length product (DLP):  326 mGy-cm   Radiation dose exposure minimized using iterative reconstruction and automated exposure control  FINDINGS: LUNGS:  Moderate compressive atelectasis in the right lower lobe  Minimal new inflammatory groundglass opacity in the left upper lobe  Mild interstitial edema  Mild paraseptal emphysema  Scattered thin-walled cysts  AIRWAYS: No significant filling defects  PLEURA:  Moderate partially loculated right pleural effusion  A loculated component in the mid anterior right pleural space lies deep to the previous site of the pigtail catheter  Trace left effusion  No pneumothorax  HEART/GREAT VESSELS:  Moderate cardiomegaly  Stable mild ectasia of ascending aorta at 4 2 cm   Moderate coronary artery calcification indicating atherosclerotic heart disease  MEDIASTINUM AND REECE:  Tracheostomy  Persistent mild mediastinal lymphadenopathy, likely reactive  CHEST WALL AND LOWER NECK: Mild infiltration of the subcutaneous fat of the anterior chest wall and mild inflammation of the right pectoralis muscle at the site of previous pigtail catheter insertion (2/23-27)  UPPER ABDOMEN:  Cholecystectomy  OSSEOUS STRUCTURES: Healed bilateral rib fractures  Healed sternal fracture  Impression: Moderate partially loculated right pleural effusion  It cannot be determined if this is a bland effusion or empyema  A small right anterior loculated component lies deep to the site of previous pigtail insertion with no fluid collection in the chest wall  No lung abscess  Moderate compressive atelectasis in the right lower lobe due to the effusion  Superimposed pneumonia cannot be excluded in the appropriate clinical setting  Mild interstitial edema  Minimal new inflammatory groundglass opacity in the left upper lobe  Stable mild ectasia of the ascending aorta at 4 2 cm  Consider yearly follow-up with a chest CT with no contrast   Workstation performed: [id number]     X-ray chest 1 view    Result Date: 8/6/2022  Narrative: CHEST INDICATION:   s/p R VATS decortication  COMPARISON:  8/3/2022, CT chest 8/2/2022 EXAM PERFORMED/VIEWS:  XR CHEST 1 VIEW Images: 2 FINDINGS: Tracheostomy tube remains in place  Right pigtail thoracostomy tube has been removed and replaced with 2 larger tubes, 1 directed toward the apex the other toward the base  Cannot exclude a small focus of loculated pneumothorax along the medial right base  No apical pneumothorax  Cardiac silhouette is enlarged, stable  Bibasilar partial volume loss and pleural effusions  Question asymmetric pulmonary venous congestion on the right  Osseous structures appear within normal limits for patient age  Impression: Revision of right-sided chest tubes  Cannot exclude small focus of loculated pneumothorax medial right base  No apical pneumothorax  Bibasilar partial volume loss and pleural effusions  Question asymmetric pulmonary venous congestion on the right  Workstation performed: [id number]     VAS lower limb venous duplex study, complete bilateral    Result Date: 8/25/2022  Narrative:  [facility] REPORT CLINICAL: Indications: Physician wants to determine patency of the venous system secondary to elevated D-Dimer  Patient denies pain or swelling in the lower extremities  FINDINGS:  Left        Impression                         Calf Veins  Chronic vs Subacute Non-Occlusive     CONCLUSION: Impression:  RIGHT LOWER LIMB: No evidence of acute or chronic deep vein thrombosis  No evidence of superficial thrombophlebitis noted  Doppler evaluation shows a normal response to augmentation maneuvers  Popliteal, posterior tibial and anterior tibial arterial Doppler waveforms are triphasic/biphasic  LEFT LOWER LIMB: There is evidence of focal chronic vs  subacute non-occlusive deep vein thrombosis noted in the soleal veins at the mid calf  No evidence of superficial thrombophlebitis noted  Doppler evaluation shows a normal response to augmentation maneuvers  Popliteal, posterior tibial and anterior tibial arterial Doppler waveforms are triphasic  Tech Note: There is an echogenic structure located in the inguinal region  This structure measures approximately 2 7 cm and is consistent with enlarged lymph node and channels  Technical findings were given to MD William Bonner Leas: Electronically Signed by: Adrian Rhodes on 2022-08-25 09:36:41 PM    IR chest tube placement    Result Date: 7/29/2022  Narrative: Ultrasound guided chest tube placement Clinical History: 25-year-old female with loculated right pleural effusion requiring chest tube placement   Conscious sedation time: 15MIN Technique: The patient was brought to the interventional radiology suite and identified verbally and by wristband  The patient was placed in the left lateral decubitus position on a stretcher  After review of the patient's prior imaging studies, the skin  over the right sided pleural fluid collection was examined with ultrasound  The skin was then prepped and draped in usual sterile fashion  Lidocaine was administered to the skin and a small skin incision was made  Under direct ultrasound guidance, a 19  gauge hollow bore needle was advanced into the fluid  A small amount of fluid was aspirated and sent for culture, sensitivity, and gram stain  A Amplatz wire was coiled within the fluid, and after tract dilatation, 12 [place]ian all-purpose drainage catheter was inserted over the wire  The catheter was sutured in place, sterilely dressed, and  attached to an [facility]  Impression: Impression: 1  Successful placement of a 12 [place] catheter into loculated right pleural effusion under ultrasound guidance  Workstation performed: [id number]         HISTORY:    Past Medical History:   Diagnosis Date    Anxiety     Aortic aneurysm ([address] )     Arthritis     Depression     Diabetes mellitus (HCC)     Fibromyalgia     GERD (gastroesophageal reflux disease)     GERD (gastroesophageal reflux disease)     H/O cardiovascular stress test 09/2018    no ischemia  EF 70%   H/O echocardiogram 01/2019    EF 60%  Mild LVH  trivial effusion   Hyperlipidemia     Hypertension     Migraines     Psychiatric disorder     anxiety    Uncontrolled hypertension 2/25/2015    Last Assessment & Plan:  BP today above goal <140/90, apparently asymptomatic  Prior BP elevations were attributed to not taking medication  Consider increased medication if persistent on f/u         Past Surgical History:   Procedure Laterality Date    BACK SURGERY      Lumbar epidural steroid injection    CARDIAC CATHETERIZATION N/A 10/22/2021    Procedure: Cardiac pci;  Surgeon: Bjorn Reece MD;  [facility]; Service: Cardiology    CARDIAC CATHETERIZATION  10/22/2021    Procedure: Cardiac catheterization;  Surgeon: Bjorn Reece MD;  [facility]; Service: Cardiology    CARDIAC CATHETERIZATION Left 10/27/2021    Procedure: Cardiac Left Heart Cath;  Surgeon: Elana Oquendo MD;  [facility]; Service: Cardiology    CARDIAC CATHETERIZATION N/A 10/27/2021    Procedure: Cardiac pci;  Surgeon: Elana Oquendo MD;  [facility]; Service: Cardiology    CARDIAC CATHETERIZATION N/A 10/28/2021    Procedure: Cardiac pci;  Surgeon: Che Brown DO;  [facility];   Service: Cardiology    CARPAL TUNNEL RELEASE Left      SECTION      CHOLECYSTECTOMY      COLONOSCOPY      incomplete    COLONOSCOPY      EYE SURGERY      HYSTERECTOMY      Total    IR CHEST TUBE PLACEMENT  2022    IR CHEST TUBE PLACEMENT  2022    IR CHEST TUBE PLACEMENT  2022    LUNG DECORTICATION Right 2022    Procedure: DECORTICATION LUNG;  Surgeon: John Haywood MD;  [facility];  Service: Thoracic    OVARIAN CYST REMOVAL      PEG W/TRACHEOSTOMY PLACEMENT N/A 2021    Procedure: TRACHEOSTOMY WITH INSERTION PEG TUBE;  Surgeon: Imani Fung DO;  [facility];  Service: General    THORACOSCOPY VIDEO ASSISTED SURGERY (VATS) Right 2022    Procedure: THORACOSCOPY VIDEO ASSISTED SURGERY (VATS), RIGHT;  Surgeon: John Haywood MD;  [facility];  Service: Thoracic    TUBAL LIGATION      UPPER GASTROINTESTINAL ENDOSCOPY         Family History   Problem Relation Age of Onset    Hypertension Mother    Earna Danuta Arthritis Mother     Diabetes Father     Other Sister         renal cell carcinoma    Diabetes Other     Factor V Leiden deficiency Other        Social History     Socioeconomic History    Marital status: Legally      Spouse name: Not on file    Number of children: Not on file    Years of education: Not on file    Highest education level: Not on file   Occupational History    Not on file   Tobacco Use    Smoking status: Former Smoker     Packs/day: 1 00     Years: 30 00     Pack years: 30 00     Types: Cigarettes    Smokeless tobacco: Never Used   Vaping Use    Vaping Use: Never used   Substance and Sexual Activity    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       Drug use: Yes     Types: Marijuana     Comment: medical; seldom use    Sexual activity: Yes     Birth control/protection: Female Sterilization     Comment: Monogamous relationship with monogamous partner   Other Topics Concern    Not on file   Social History Narrative    Most recent tobacco use screenin2019    Do you currently or have you served in the [employer]: No    Were you activated, into active duty, as a member of the [employer] or as a Reservist: No    Advance directive: No    Chewing tobacco: none    Alcohol intake: None    Marital status: Single    Live alone or with others: with others    Family history of heart disease:    aortic aneurysm    High cholesterol: Yes    High blood pressure: Yes    Exercise level: Heavy    Overweight: Yes    Obese: Yes    Diabetes: Yes    General stress level: High    Diet: Regular    Caffeine intake: Occasional    Guns present in home: No    Seat belts used routinely: Yes    Sexual orientation: Heterosexual    Sunscreen used routinely: No    Seat belt/car seat used routinely: Yes    Exercise-How often do you exercise: as tolerated    Do you have regular medical check ups: Yes    Do you have regular dental check ups: Yes    Illicit drugs-years of use:    recovery 23 years - HX of     Social Determinants of Health     Financial Resource Strain: Not on file   Food Insecurity: Food Insecurity Present    Worried About Running Out of Food in the Last Year: Sometimes true    Lela of Food in the Last Year: Sometimes true   Transportation Needs: No Transportation Needs    Lack of Transportation (Medical): No    Lack of Transportation (Non-Medical):  No   Physical Activity: Not on file   Stress: Not on file   Social Connections: Not on file   Intimate Partner Violence: Not on file   Housing Stability: Low Risk     Unable to Pay for Housing in the Last Year: No    Number of Places Lived in the Last Year: 1    Unstable Housing in the Last Year: No         Current Facility-Administered Medications:     acetaminophen (TYLENOL) tablet 975 mg, 975 mg, Oral, Q8H [address], Treasure Diaz MD, 975 mg at 08/26/22 0407    albuterol (PROVENTIL HFA,VENTOLIN HFA) inhaler 2 puff, 2 puff, Inhalation, Q4H PRN, Alejandro Dang MD, 2 puff at 08/25/22 1939    amiodarone tablet 100 mg, 100 mg, Oral, Daily With Breakfast, Alejandro Dang MD, 100 mg at 08/26/22 1022    atorvastatin (LIPITOR) tablet 40 mg, 40 mg, Oral, Daily With Carter Ayoub MD, 40 mg at 08/25/22 1744    benzonatate (TESSALON PERLES) capsule 100 mg, 100 mg, Oral, TID PRN, Alejandro Dang MD, 100 mg at 08/25/22 1744    dexamethasone (DECADRON) injection 6 mg, 6 mg, Intravenous, Q24H, Fawad Pinto MD, 6 mg at 08/26/22 1022    escitalopram (LEXAPRO) tablet 10 mg, 10 mg, Oral, Daily, Treasure Diaz MD, 10 mg at 08/26/22 1023    ferrous sulfate tablet 325 mg, 325 mg, Oral, Daily With Breakfast, Alejandro Dang MD, 325 mg at 08/25/22 1150    heparin (porcine) 25,000 units in 0 45% NaCl 250 mL infusion (premix), 3-20 Units/kg/hr (Order-Specific), Intravenous, Titrated, Merly Siddiqui MD, Last Rate: 12 8 mL/hr at 08/26/22 1212, 16 Units/kg/hr at 08/26/22 1212    heparin (porcine) injection 2,000 Units, 2,000 Units, Intravenous, Q1H PRN, Merly Siddiqui MD    heparin (porcine) injection 4,000 Units, 4,000 Units, Intravenous, Q1H PRN, Merly Siddiqui MD, 4,000 Units at 08/26/22 0238    hydrOXYzine HCL (ATARAX) tablet 25 mg, 25 mg, Oral, Q6H PRN, Suzanna Peralta MD, 25 mg at 08/25/22 1150    insulin glargine (LANTUS) subcutaneous injection 20 Units 0 2 mL, 20 Units, Subcutaneous, HS, Darion Mitchell MD    insulin lispro (HumaLOG) 100 units/mL subcutaneous injection 1-5 Units, 1-5 Units, Subcutaneous, HS, Treasure Storey MD, 3 Units at 08/25/22 2247    insulin lispro (HumaLOG) 100 units/mL subcutaneous injection 1-6 Units, 1-6 Units, Subcutaneous, TID AC, 5 Units at 08/26/22 1211 **AND** Fingerstick Glucose (POCT), , , TID AC, Treasure Storey MD    insulin lispro (HumaLOG) 100 units/mL subcutaneous injection 5 Units, 5 Units, Subcutaneous, TID With Meals, Darion Mitchell MD, 5 Units at 08/26/22 1211    levalbuterol (Joyice Browner) inhalation solution 0 63 mg, 0 63 mg, Nebulization, TID PRN, Priscilla Alas MD, 0 63 mg at 08/25/22 1737    lidocaine (LIDODERM) 5 % patch 2 patch, 2 patch, Topical, Daily, Suzanna Peralta MD, 2 patch at 08/26/22 1024    LORazepam (ATIVAN) tablet 0 5 mg, 0 5 mg, Oral, Q8H PRN, Suzanna Peralta MD    metoprolol succinate (TOPROL-XL) 24 hr tablet 50 mg, 50 mg, Oral, Q12H, Treasure Storey MD, 50 mg at 08/26/22 0407    mirtazapine (REMERON) tablet 7 5 mg, 7 5 mg, Oral, HS, Treasure Storey MD, 7 5 mg at 08/25/22 2246    pantoprazole (PROTONIX) EC tablet 40 mg, 40 mg, Oral, Early Morning, Treasure Storey MD, 40 mg at 08/26/22 0408    polyethylene glycol (MIRALAX) packet 17 g, 17 g, Oral, Daily, Treasure Storey MD, 17 g at 08/26/22 1024    pregabalin (LYRICA) capsule 75 mg, 75 mg, Oral, Daily, Treasure Storey MD, 75 mg at 08/26/22 1022    [COMPLETED] remdesivir (Veklury) 200 mg in sodium chloride 0 9 % 290 mL IVPB, 200 mg, Intravenous, Q24H, 200 mg at 08/25/22 1721 **FOLLOWED BY** remdesivir (Veklury) 100 mg in sodium chloride 0 9 % 270 mL IVPB, 100 mg, Intravenous, Q24H, Milvia Caputo MD, 100 mg at 08/26/22 1206    sodium chloride 0 9 % inhalation solution 3 mL, 3 mL, Nebulization, TID PRN, Venessa Evans MD, 3 mL at 08/25/22 1737    ticagrelor (BRILINTA) tablet 90 mg, 90 mg, Oral, Q12H [address], Bolanle Darylene Fenton, MD, 90 mg at 08/26/22 1023    Medications Prior to Admission   Medication    escitalopram (LEXAPRO) 10 mg tablet    acetaminophen (TYLENOL) 325 mg tablet    albuterol (2 5 mg/3 mL) 0 083 % nebulizer solution    amiodarone 100 mg tablet    apixaban (ELIQUIS) 5 mg    atorvastatin (LIPITOR) 40 mg tablet    benzonatate (TESSALON PERLES) 100 mg capsule    Blood Pressure Monitoring (Sphygmomanometer) MISC    ferrous sulfate 325 (65 Fe) mg tablet    hydrOXYzine HCL (ATARAX) 25 mg tablet    insulin glargine (LANTUS) 100 units/mL subcutaneous injection    insulin lispro (HumaLOG KwikPen) 100 units/mL injection pen    Insulin Pen Needle 31G X 6 MM MISC    ipratropium (ATROVENT) 0 02 % nebulizer solution    Lancets MISC    levalbuterol (XOPENEX) 1 25 mg/0 5 mL nebulizer solution    lidocaine (LIDODERM) 5 %    LORazepam (Ativan) 1 mg tablet    metoprolol succinate (TOPROL-XL) 50 mg 24 hr tablet    mirtazapine (REMERON) 7 5 MG tablet    Novofine Pen Needle 32G X 6 MM MISC    pantoprazole (PROTONIX) 40 mg tablet    polyethylene glycol (MIRALAX) 17 g packet    pregabalin (LYRICA) 75 mg capsule    ticagrelor (BRILINTA) 90 MG    white petrolatum-mineral oil (EUCERIN,HYDROCERIN) cream       Allergies   Allergen Reactions    Metformin Diarrhea    Clonidine Rash     Patch        Labs and pertinent reports reviewed  This note has been generated by voice recognition software system  Therefore, there may be spelling, grammar, and or syntax errors  Please contact if questions arise

## 2022-08-26 NOTE — PROGRESS NOTES
[facility]  Progress Note - Sharad Lugoi 1965, 65 y o  female MRN: [id number]  Unit/Bed#: S -01 Encounter: [id number]  Primary Care Provider: Rashid Shirley MD   Date and time admitted to hospital: 8/24/2022  9:34 AM    * Acute Respiratory insufficiency on chronic hypoxic respiratory failure  Assessment & Plan  · POA, presented from rehab with acute onset respiratory distress and hypoxia shortly after eating  · Symptoms currently resolved and patient feels back at baseline  · Recently discharged from the hospital to rehab on 8/18/22 following admission for right empyema with initial mariajose failed chest tube management and subsequently requiring VATS decortication on 8/6/22  · Has trach in place from prior admissions  Denies increased secretions  · CT chest shows bilateral consolidations which may represent infection  · Will continue supplemental trach collar O2 to maintain sats > 90%  · She is on 10 l of oxygen today  Plan  · Continue nebs, respiratory protocol  · Continue Remdesivir, Dexamethasone  · Pulmonary on board    COVID-19  Assessment & Plan  · POA, initially tested positive for COVID on 5/19/22 and again today 8/24/22  · Doubt current symptoms related to COVID in view of sudden onset with almost immediate resolution  · Complained of thick sputum whitish to yellowish in color, NO fever or SOB, On 10 l oxygen, saturation 98%    Plan  · Will monitor per mild COVID pathway  · Contionue Remdesivir and Dexamethasone  · respi  protocol  DVT (deep venous thrombosis) ([facility])  Assessment & Plan  Venous doppler  evidence of focal chronic vs  subacute non-occlusive deep vein  thrombosis noted in the soleal veins at the mid calf  D dimer:6 2    Plan  Hematology consulted      Chronic heart failure with preserved ejection fraction [facility])  Assessment & Plan  Wt Readings from Last 3 Encounters:   08/23/22 80 8 kg (178 lb 3 2 oz)   08/19/22 80 8 kg (178 lb 3 2 oz)   08/18/22 81 1 kg (178 lb 12 7 oz)     · Patient had an admission to [facility] in July for CHF exacerbation  · At that time was maintained on Bumex 2 mg daily and Aldactone 100 mg daily  · [facility] dec[facility], it appears Bumex had been discontinued on 08/13 and Aldactone decreased to 50 mg daily  · Review of med list shows patient was not discharged on diuretics  · Chest x-ray today shows mild to moderate pulmonary vascular congestion and small right pleural effusion  · Last echocardiogram was in February of 2022 which showed EF 59%, normal diastolic function  Will check an echocardiogram this admission    Plan  · Cardiology on board  · Received Lasix 60 mg 1 dose at ED  · Monitor for worsening heart failure      Chronic kidney disease  Assessment & Plan  Lab Results   Component Value Date    EGFR 57 08/26/2022    EGFR 51 08/25/2022    EGFR 44 08/24/2022    CREATININE 1 08 08/26/2022    CREATININE 1 18 08/25/2022    CREATININE 1 34 (H) 08/24/2022     · Baseline creatinine around 0 9-1 0 based on review of most recent admission  · Creatinine elevated at 1 34 on admission but back to baseline today  · Will continue to monitor closely while on diuretics    Tracheostomy in place [facility])  Assessment & Plan  · Also with history of subglottic stenosis  · Continue routine trach care  · Outpatient follow-up with pulmonology/ENT    History of Cardiac arrest [facility])  Assessment & Plan  · Continue home medications, monitor on telemetry    Type 2 diabetes mellitus with hyperglycemia [facility])  Assessment & Plan  Lab Results   Component Value Date    HGBA1C 8 1 (H) 08/24/2022       Recent Labs     08/25/22  0727 08/25/22  1045 08/25/22  1610 08/25/22  2132   POCGLU 64* 219* 296* 343*       Blood Sugar Average: Last 72 hrs:  · (P) 151 [id number]   · Has been uncontrolled based on most recent hemoglobin A1c results  · She will be continued on basal insulin per most recent discharge doses  · Accu-Cheks a c  HS with correctional scale insulin  · Will repeat hemoglobin A1c this admission        VTE Pharmacologic Prophylaxis: VTE Score: 3 Moderate Risk (Score 3-4) - Pharmacological DVT Prophylaxis Ordered: apixaban (Eliquis)  Patient Centered Rounds: I performed bedside rounds with nursing staff today  Education and Discussions with Family / Patient: Called son and updated  Current Length of Stay: 2 day(s)  Current Patient Status: Inpatient   Discharge Plan: Anticipate discharge in 24-48 hrs to to be determined    Code Status: Level 1 - Full Code    Subjective:   Patient did not have episodes of shortness breath since the last morning  Complained of increased secretions of thick sputum white to yellow in color  Also complained of chills and reduced appetite  But no SOB or fever  No cough, nausea, vomiting or diarrhea  She is on 10 l of oxygen and sating at 98%  Objective:     Vitals:   Temp (24hrs), Av 9 °F (36 6 °C), Min:97 6 °F (36 4 °C), Max:98 3 °F (36 8 °C)    Temp:  [97 6 °F (36 4 °C)-98 3 °F (36 8 °C)] 97 6 °F (36 4 °C)  HR:  [64-70] 64  Resp:  [17-18] 17  BP: (151-160)/(84-90) 151/84  SpO2:  [96 %-98 %] 97 %  There is no height or weight on file to calculate BMI  Input and Output Summary (last 24 hours): Intake/Output Summary (Last 24 hours) at 2022 1126  Last data filed at 2022 0137  Gross per 24 hour   Intake --   Output 900 ml   Net -900 ml       Physical Exam:   Physical Exam  Constitutional:       Appearance: Normal appearance  HENT:      Head: Normocephalic and atraumatic  Nose: No congestion  Eyes:      Conjunctiva/sclera: Conjunctivae normal    Cardiovascular:      Rate and Rhythm: Normal rate and regular rhythm  Pulses: Normal pulses  Heart sounds: Normal heart sounds  Pulmonary:      Effort: Pulmonary effort is normal  No respiratory distress  Breath sounds: Rales present  Abdominal:      General: Abdomen is flat  Bowel sounds are normal       Palpations: Abdomen is soft  Skin:     General: Skin is warm and dry  Capillary Refill: Capillary refill takes less than 2 seconds  Neurological:      General: No focal deficit present  Mental Status: She is alert and oriented to person, place, and time  Psychiatric:         Mood and Affect: Mood normal          Behavior: Behavior normal           Additional Data:     Labs:  Results from last 7 days   Lab Units 08/26/22  0450 08/25/22  0436 08/24/22  0951   WBC Thousand/uL 9 32   < > 10 32*   HEMOGLOBIN g/dL 8 2*   < > 9 3*   HEMATOCRIT % 26 8*   < > 31 2*   PLATELETS Thousands/uL 458*   < > 574*   NEUTROS PCT %  --   --  76*   LYMPHS PCT %  --   --  15   MONOS PCT %  --   --  6   EOS PCT %  --   --  2    < > = values in this interval not displayed       Results from last 7 days   Lab Units 08/26/22  0451   SODIUM mmol/L 141   POTASSIUM mmol/L 4 0   CHLORIDE mmol/L 108   CO2 mmol/L 26   BUN mg/dL 32*   CREATININE mg/dL 1 08   ANION GAP mmol/L 7   CALCIUM mg/dL 8 3*   ALBUMIN g/dL 2 7*   TOTAL BILIRUBIN mg/dL 0 33   ALK PHOS U/L 111*   ALT U/L 11   AST U/L 11*   GLUCOSE RANDOM mg/dL 249*         Results from last 7 days   Lab Units 08/26/22  0824 08/25/22  2132 08/25/22  1610 08/25/22  1045 08/25/22  0727 08/24/22  2141 08/24/22  1828   POC GLUCOSE mg/dl 228* 343* 296* 219* 64* 218* 142*     Results from last 7 days   Lab Units 08/24/22  0951   HEMOGLOBIN A1C % 8 1*     Results from last 7 days   Lab Units 08/26/22  0451 08/25/22  1054 08/24/22  0951   LACTIC ACID mmol/L  --   --  1 8   PROCALCITONIN ng/ml 0 77* 1 00* 0 11       Lines/Drains:  Invasive Devices  Report    Peripherally Inserted Central Catheter Line  Duration           PICC Line 08/11/22 Right Brachial 15 days          Peripheral Intravenous Line  Duration           Peripheral IV 08/24/22 Right Antecubital 1 day          Airway  Duration           Surgical Airway Shiley Fenestrated 177 days Telemetry:  Telemetry Orders (From admission, onward)             48 Hour Telemetry Monitoring  Continuous x 48 hours        References:    Telemetry Guidelines   Question:  Reason for 48 Hour Telemetry  Answer:  Acute Decompensated CHF (continuous diuretic infusion or total diuretic dose > 200 mg daily, associated electrolyte derangement, ionotropic drip, history of ventricular arrhythmia, or new EF <35%)                            Imaging: Reviewed radiology reports from this admission including: chest xray    Recent Cultures (last 7 days):   Results from last 7 days   Lab Units 08/24/22  0951   BLOOD CULTURE  No Growth at 24 hrs  No Growth at 24 hrs         Last 24 Hours Medication List:   Current Facility-Administered Medications   Medication Dose Route Frequency Provider Last Rate    acetaminophen  975 mg Oral Q8H [facility] Treasure Storey MD      albuterol  2 puff Inhalation Q4H PRN Suzanna Peralta MD      amiodarone  100 mg Oral Daily With Breakfast Treasure Storey MD      atorvastatin  40 mg Oral Daily With Thomas Hensley MD      benzonatate  100 mg Oral TID PRN Suzanna Peralta MD      dexamethasone  6 mg Intravenous Q24H Milvia Samayoa MD      escitalopram  10 mg Oral Daily Treasure Storey MD      ferrous sulfate  325 mg Oral Daily With Breakfast Treasure Storey MD      heparin (porcine)  3-20 Units/kg/hr (Order-Specific) Intravenous Titrated Nick Carmona MD 16 Units/kg/hr (08/26/22 0240)    heparin (porcine)  2,000 Units Intravenous Q1H PRN Nick Carmona MD      heparin (porcine)  4,000 Units Intravenous Q1H PRN Nick Carmona MD      hydrOXYzine HCL  25 mg Oral Q6H PRN Suzanna Peralta MD      insulin glargine  20 Units Subcutaneous HS Darion Mitchell MD      insulin lispro  1-5 Units Subcutaneous HS Treasure Storey MD      insulin lispro  1-6 Units Subcutaneous TID AC Treasure Johnson Linda Miller MD      insulin lispro  5 Units Subcutaneous TID With Meals Sharyn Jeans, MD      levalbuterol  0 63 mg Nebulization TID PRN Genie Richards MD      lidocaine  2 patch Topical Daily Treasure Holder MD      LORazepam  0 5 mg Oral Q8H PRN Rickey Stewart MD      metoprolol succinate  50 mg Oral Q12H Treasure Holder MD      mirtazapine  7 5 mg Oral HS Treasure Holder MD      pantoprazole  40 mg Oral Early Morning Treasure Holder MD      polyethylene glycol  17 g Oral Daily Treasure Holder MD      pregabalin  75 mg Oral Daily Treasure Holder MD      remdesivir  100 mg Intravenous Q24H Milvia Szymanski MD      sodium chloride  3 mL Nebulization TID PRN Genie Richards MD      ticagrelor  90 mg Oral Q12H [facility] Rickey Stewart MD          Today, Patient Was Seen By: Rakesh Dickerson MD    **Please Note: This note may have been constructed using a voice recognition system  **

## 2022-08-26 NOTE — ASSESSMENT & PLAN NOTE
Wt Readings from Last 3 Encounters:   08/23/22 80 8 kg (178 lb 3 2 oz)   08/19/22 80 8 kg (178 lb 3 2 oz)   08/18/22 81 1 kg (178 lb 12 7 oz)     · Patient had an admission to [facility] in July for CHF exacerbation  · At that time was maintained on Bumex 2 mg daily and Aldactone 100 mg daily  · Following discharge from [facility] after VATS decortication, it appears Bumex had been discontinued on 08/13 and Aldactone decreased to 50 mg daily  · Review of med list shows patient was not discharged on diuretics  · Chest x-ray today shows mild to moderate pulmonary vascular congestion and small right pleural effusion  · Last echocardiogram was in February of 2022 which showed EF 04%, normal diastolic function  Will check an echocardiogram this admission    Plan  · Cardiology on board  · Received Lasix 60 mg 1 dose at ED  · Monitor for worsening heart failure

## 2022-08-26 NOTE — ASSESSMENT & PLAN NOTE
· POA, presented from rehab with acute onset respiratory distress and hypoxia shortly after eating  · Symptoms currently resolved and patient feels back at baseline  · Recently discharged from the hospital to rehab on 8/18/22 following admission for right empyema with initial mariajose failed chest tube management and subsequently requiring VATS decortication on 8/6/22  · Has trach in place from prior admissions  Denies increased secretions  · CT chest shows bilateral consolidations which may represent infection  · Will continue supplemental trach collar O2 to maintain sats > 90%  · She is on 10 l of oxygen today  Plan  · Continue nebs, respiratory protocol  · Continue Remdesivir, Dexamethasone     · Pulmonary on board

## 2022-08-26 NOTE — PLAN OF CARE
Problem: Potential for Falls  Goal: Patient will remain free of falls  Description: INTERVENTIONS:  - Educate patient/family on patient safety including physical limitations  - Instruct patient to call for assistance with activity   - Consult OT/PT to assist with strengthening/mobility   - Keep Call bell within reach  - Keep bed low and locked with side rails adjusted as appropriate  - Keep care items and personal belongings within reach  - Initiate and maintain comfort rounds  - Make Fall Risk Sign visible to staff  - Offer Toileting every 2 Hours, in advance of need  - Initiate/Maintain bed alarm  - Obtain necessary fall risk management equipment  - Apply yellow socks and bracelet for high fall risk patients  - Consider moving patient to room near nurses station  Outcome: Progressing     Problem: Prexisting or High Potential for Compromised Skin Integrity  Goal: Skin integrity is maintained or improved  Description: INTERVENTIONS:  - Identify patients at risk for skin breakdown  - Assess and monitor skin integrity  - Assess and monitor nutrition and hydration status  - Monitor labs   - Assess for incontinence   - Turn and reposition patient  - Assist with mobility/ambulation  - Relieve pressure over bony prominences  - Avoid friction and shearing  - Provide appropriate hygiene as needed including keeping skin clean and dry  - Evaluate need for skin moisturizer/barrier cream  - Collaborate with interdisciplinary team   - Patient/family teaching  - Consider wound care consult   Outcome: Progressing     Problem: RESPIRATORY - ADULT  Goal: Achieves optimal ventilation and oxygenation  Description: INTERVENTIONS:  - Assess for changes in respiratory status  - Assess for changes in mentation and behavior  - Position to facilitate oxygenation and minimize respiratory effort  - Oxygen administered by appropriate delivery if ordered  - Initiate smoking cessation education as indicated  - Encourage broncho-pulmonary hygiene including cough, deep breathe, Incentive Spirometry  - Assess the need for suctioning and aspirate as needed  - Assess and instruct to report SOB or any respiratory difficulty  - Respiratory Therapy support as indicated  Outcome: Progressing     Problem: Nutrition/Hydration-ADULT  Goal: Nutrient/Hydration intake appropriate for improving, restoring or maintaining nutritional needs  Description: Monitor and assess patient's nutrition/hydration status for malnutrition  Collaborate with interdisciplinary team and initiate plan and interventions as ordered  Monitor patient's weight and dietary intake as ordered or per policy  Utilize nutrition screening tool and intervene as necessary  Determine patient's food preferences and provide high-protein, high-caloric foods as appropriate       INTERVENTIONS:  - Monitor oral intake, urinary output, labs, and treatment plans  - Assess nutrition and hydration status and recommend course of action  - Evaluate amount of meals eaten  - Assist patient with eating if necessary   - Allow adequate time for meals  - Recommend/ encourage appropriate diets, oral nutritional supplements, and vitamin/mineral supplements  - Order, calculate, and assess calorie counts as needed  - Recommend, monitor, and adjust tube feedings and TPN/PPN based on assessed needs  - Assess need for intravenous fluids  - Provide specific nutrition/hydration education as appropriate  - Include patient/family/caregiver in decisions related to nutrition  Outcome: Progressing

## 2022-08-26 NOTE — ASSESSMENT & PLAN NOTE
Venous doppler  evidence of focal chronic vs  subacute non-occlusive deep vein  thrombosis noted in the soleal veins at the mid calf  D dimer:6 2    Plan  Hematology consulted

## 2022-08-26 NOTE — ASSESSMENT & PLAN NOTE
· POA, initially tested positive for COVID on 5/19/22 and again today 8/24/22  · Doubt current symptoms related to COVID in view of sudden onset with almost immediate resolution  · Complained of thick sputum whitish to yellowish in color, NO fever or SOB, On 10 l oxygen, saturation 98%    Plan  · Will monitor per mild COVID pathway  · Contionue Remdesivir and Dexamethasone  · respi  protocol

## 2022-08-26 NOTE — ASSESSMENT & PLAN NOTE
Lab Results   Component Value Date    EGFR 57 08/26/2022    EGFR 51 08/25/2022    EGFR 44 08/24/2022    CREATININE 1 08 08/26/2022    CREATININE 1 18 08/25/2022    CREATININE 1 34 (H) 08/24/2022     · Baseline creatinine around 0 9-1 0 based on review of most recent admission  · Creatinine elevated at 1 34 on admission but back to baseline today  · Will continue to monitor closely while on diuretics

## 2022-08-27 LAB
ALBUMIN SERPL BCP-MCNC: 2.6 G/DL (ref 3.5–5)
ALP SERPL-CCNC: 111 U/L (ref 34–104)
ALT SERPL W P-5'-P-CCNC: 10 U/L (ref 7–52)
ANION GAP SERPL CALCULATED.3IONS-SCNC: 6 MMOL/L (ref 4–13)
APTT PPP: 108 SECONDS (ref 23–37)
APTT PPP: 49 SECONDS (ref 23–37)
APTT PPP: 61 SECONDS (ref 23–37)
AST SERPL W P-5'-P-CCNC: 9 U/L (ref 13–39)
BASOPHILS # BLD AUTO: 0.04 THOUSANDS/ΜL (ref 0–0.1)
BASOPHILS NFR BLD AUTO: 0 % (ref 0–1)
BILIRUB SERPL-MCNC: 0.28 MG/DL (ref 0.2–1)
BUN SERPL-MCNC: 34 MG/DL (ref 5–25)
CALCIUM ALBUM COR SERPL-MCNC: 9.2 MG/DL (ref 8.3–10.1)
CALCIUM SERPL-MCNC: 8.1 MG/DL (ref 8.4–10.2)
CHLORIDE SERPL-SCNC: 108 MMOL/L (ref 96–108)
CO2 SERPL-SCNC: 27 MMOL/L (ref 21–32)
CREAT SERPL-MCNC: 1.16 MG/DL (ref 0.6–1.3)
EOSINOPHIL # BLD AUTO: 0.04 THOUSAND/ΜL (ref 0–0.61)
EOSINOPHIL NFR BLD AUTO: 0 % (ref 0–6)
ERYTHROCYTE [DISTWIDTH] IN BLOOD BY AUTOMATED COUNT: 18.8 % (ref 11.6–15.1)
ERYTHROCYTE [DISTWIDTH] IN BLOOD BY AUTOMATED COUNT: 19 % (ref 11.6–15.1)
GFR SERPL CREATININE-BSD FRML MDRD: 52 ML/MIN/1.73SQ M
GLUCOSE SERPL-MCNC: 195 MG/DL (ref 65–140)
GLUCOSE SERPL-MCNC: 234 MG/DL (ref 65–140)
GLUCOSE SERPL-MCNC: 279 MG/DL (ref 65–140)
GLUCOSE SERPL-MCNC: 305 MG/DL (ref 65–140)
GLUCOSE SERPL-MCNC: 311 MG/DL (ref 65–140)
HCT VFR BLD AUTO: 25 % (ref 34.8–46.1)
HCT VFR BLD AUTO: 26 % (ref 34.8–46.1)
HGB BLD-MCNC: 7.4 G/DL (ref 11.5–15.4)
HGB BLD-MCNC: 7.9 G/DL (ref 11.5–15.4)
IMM GRANULOCYTES # BLD AUTO: 0.08 THOUSAND/UL (ref 0–0.2)
IMM GRANULOCYTES NFR BLD AUTO: 1 % (ref 0–2)
LYMPHOCYTES # BLD AUTO: 0.9 THOUSANDS/ΜL (ref 0.6–4.47)
LYMPHOCYTES NFR BLD AUTO: 7 % (ref 14–44)
MCH RBC QN AUTO: 25 PG (ref 26.8–34.3)
MCH RBC QN AUTO: 25.8 PG (ref 26.8–34.3)
MCHC RBC AUTO-ENTMCNC: 29.6 G/DL (ref 31.4–37.4)
MCHC RBC AUTO-ENTMCNC: 30.4 G/DL (ref 31.4–37.4)
MCV RBC AUTO: 85 FL (ref 82–98)
MCV RBC AUTO: 85 FL (ref 82–98)
MONOCYTES # BLD AUTO: 0.45 THOUSAND/ΜL (ref 0.17–1.22)
MONOCYTES NFR BLD AUTO: 3 % (ref 4–12)
NEUTROPHILS # BLD AUTO: 11.56 THOUSANDS/ΜL (ref 1.85–7.62)
NEUTS SEG NFR BLD AUTO: 89 % (ref 43–75)
NRBC BLD AUTO-RTO: 0 /100 WBCS
PLATELET # BLD AUTO: 419 THOUSANDS/UL (ref 149–390)
PLATELET # BLD AUTO: 427 THOUSANDS/UL (ref 149–390)
PMV BLD AUTO: 10.2 FL (ref 8.9–12.7)
PMV BLD AUTO: 10.3 FL (ref 8.9–12.7)
POTASSIUM SERPL-SCNC: 4 MMOL/L (ref 3.5–5.3)
PROT SERPL-MCNC: 6.9 G/DL (ref 6.4–8.4)
RBC # BLD AUTO: 2.96 MILLION/UL (ref 3.81–5.12)
RBC # BLD AUTO: 3.06 MILLION/UL (ref 3.81–5.12)
SODIUM SERPL-SCNC: 141 MMOL/L (ref 135–147)
WBC # BLD AUTO: 11.22 THOUSAND/UL (ref 4.31–10.16)
WBC # BLD AUTO: 13.07 THOUSAND/UL (ref 4.31–10.16)

## 2022-08-27 PROCEDURE — 99232 SBSQ HOSP IP/OBS MODERATE 35: CPT | Performed by: INTERNAL MEDICINE

## 2022-08-27 PROCEDURE — 94640 AIRWAY INHALATION TREATMENT: CPT

## 2022-08-27 PROCEDURE — 85025 COMPLETE CBC W/AUTO DIFF WBC: CPT | Performed by: STUDENT IN AN ORGANIZED HEALTH CARE EDUCATION/TRAINING PROGRAM

## 2022-08-27 PROCEDURE — 85730 THROMBOPLASTIN TIME PARTIAL: CPT | Performed by: INTERNAL MEDICINE

## 2022-08-27 PROCEDURE — 85027 COMPLETE CBC AUTOMATED: CPT | Performed by: INTERNAL MEDICINE

## 2022-08-27 PROCEDURE — 82948 REAGENT STRIP/BLOOD GLUCOSE: CPT

## 2022-08-27 PROCEDURE — 94760 N-INVAS EAR/PLS OXIMETRY 1: CPT

## 2022-08-27 PROCEDURE — 80053 COMPREHEN METABOLIC PANEL: CPT | Performed by: INTERNAL MEDICINE

## 2022-08-27 RX ORDER — INSULIN LISPRO 100 [IU]/ML
10 INJECTION, SOLUTION INTRAVENOUS; SUBCUTANEOUS
Status: DISCONTINUED | OUTPATIENT
Start: 2022-08-27 | End: 2022-08-27

## 2022-08-27 RX ORDER — OXYCODONE HYDROCHLORIDE 5 MG/1
2.5 TABLET ORAL ONCE
Status: COMPLETED | OUTPATIENT
Start: 2022-08-27 | End: 2022-08-27

## 2022-08-27 RX ORDER — LEVALBUTEROL 1.25 MG/.5ML
1.25 SOLUTION, CONCENTRATE RESPIRATORY (INHALATION)
Status: DISCONTINUED | OUTPATIENT
Start: 2022-08-27 | End: 2022-08-30 | Stop reason: HOSPADM

## 2022-08-27 RX ORDER — INSULIN GLARGINE 100 [IU]/ML
30 INJECTION, SOLUTION SUBCUTANEOUS
Status: DISCONTINUED | OUTPATIENT
Start: 2022-08-27 | End: 2022-08-30

## 2022-08-27 RX ORDER — INSULIN LISPRO 100 [IU]/ML
12 INJECTION, SOLUTION INTRAVENOUS; SUBCUTANEOUS
Status: DISCONTINUED | OUTPATIENT
Start: 2022-08-27 | End: 2022-08-29

## 2022-08-27 RX ADMIN — INSULIN LISPRO 5 UNITS: 100 INJECTION, SOLUTION INTRAVENOUS; SUBCUTANEOUS at 17:49

## 2022-08-27 RX ADMIN — LEVALBUTEROL HYDROCHLORIDE 0.63 MG: 0.63 SOLUTION RESPIRATORY (INHALATION) at 14:08

## 2022-08-27 RX ADMIN — HEPARIN SODIUM 16 UNITS/KG/HR: 10000 INJECTION, SOLUTION INTRAVENOUS at 09:12

## 2022-08-27 RX ADMIN — MIRTAZAPINE 7.5 MG: 15 TABLET, FILM COATED ORAL at 22:07

## 2022-08-27 RX ADMIN — REMDESIVIR 100 MG: 100 INJECTION, POWDER, LYOPHILIZED, FOR SOLUTION INTRAVENOUS at 12:16

## 2022-08-27 RX ADMIN — LIDOCAINE 5% 1 PATCH: 700 PATCH TOPICAL at 09:13

## 2022-08-27 RX ADMIN — HEPARIN SODIUM 2000 UNITS: 1000 INJECTION INTRAVENOUS; SUBCUTANEOUS at 13:01

## 2022-08-27 RX ADMIN — LEVALBUTEROL HYDROCHLORIDE 1.25 MG: 1.25 SOLUTION, CONCENTRATE RESPIRATORY (INHALATION) at 20:46

## 2022-08-27 RX ADMIN — HEPARIN SODIUM 18 UNITS/KG/HR: 10000 INJECTION, SOLUTION INTRAVENOUS at 23:44

## 2022-08-27 RX ADMIN — FERROUS SULFATE TAB 325 MG (65 MG ELEMENTAL FE) 325 MG: 325 (65 FE) TAB at 08:03

## 2022-08-27 RX ADMIN — DEXAMETHASONE SODIUM PHOSPHATE 6 MG: 4 INJECTION INTRA-ARTICULAR; INTRALESIONAL; INTRAMUSCULAR; INTRAVENOUS; SOFT TISSUE at 09:11

## 2022-08-27 RX ADMIN — INSULIN LISPRO 10 UNITS: 100 INJECTION, SOLUTION INTRAVENOUS; SUBCUTANEOUS at 09:13

## 2022-08-27 RX ADMIN — PANTOPRAZOLE SODIUM 40 MG: 40 TABLET, DELAYED RELEASE ORAL at 05:17

## 2022-08-27 RX ADMIN — INSULIN GLARGINE 30 UNITS: 100 INJECTION, SOLUTION SUBCUTANEOUS at 22:08

## 2022-08-27 RX ADMIN — INSULIN LISPRO 2 UNITS: 100 INJECTION, SOLUTION INTRAVENOUS; SUBCUTANEOUS at 09:12

## 2022-08-27 RX ADMIN — TICAGRELOR 90 MG: 90 TABLET ORAL at 09:11

## 2022-08-27 RX ADMIN — ATORVASTATIN CALCIUM 40 MG: 40 TABLET, FILM COATED ORAL at 17:49

## 2022-08-27 RX ADMIN — IPRATROPIUM BROMIDE 0.5 MG: 0.5 SOLUTION RESPIRATORY (INHALATION) at 20:46

## 2022-08-27 RX ADMIN — AMIODARONE HYDROCHLORIDE 100 MG: 200 TABLET ORAL at 09:00

## 2022-08-27 RX ADMIN — METOPROLOL SUCCINATE 50 MG: 50 TABLET, EXTENDED RELEASE ORAL at 17:49

## 2022-08-27 RX ADMIN — INSULIN LISPRO 12 UNITS: 100 INJECTION, SOLUTION INTRAVENOUS; SUBCUTANEOUS at 17:50

## 2022-08-27 RX ADMIN — TICAGRELOR 90 MG: 90 TABLET ORAL at 22:07

## 2022-08-27 RX ADMIN — INSULIN LISPRO 3 UNITS: 100 INJECTION, SOLUTION INTRAVENOUS; SUBCUTANEOUS at 12:16

## 2022-08-27 RX ADMIN — METOPROLOL SUCCINATE 50 MG: 50 TABLET, EXTENDED RELEASE ORAL at 02:17

## 2022-08-27 RX ADMIN — INSULIN LISPRO 10 UNITS: 100 INJECTION, SOLUTION INTRAVENOUS; SUBCUTANEOUS at 12:16

## 2022-08-27 RX ADMIN — INSULIN LISPRO 2 UNITS: 100 INJECTION, SOLUTION INTRAVENOUS; SUBCUTANEOUS at 22:08

## 2022-08-27 RX ADMIN — OXYCODONE HYDROCHLORIDE 2.5 MG: 5 TABLET ORAL at 14:32

## 2022-08-27 RX ADMIN — PREGABALIN 75 MG: 75 CAPSULE ORAL at 09:11

## 2022-08-27 RX ADMIN — ESCITALOPRAM OXALATE 10 MG: 10 TABLET ORAL at 09:11

## 2022-08-27 NOTE — PROGRESS NOTES
Progress Note - Pulmonary   Brittanie Meo 65 y o  female MRN: [id number]  Unit/Bed#: S -01 Encounter: [id number]      Assessment:    1  Acute on chronic hypoxic respiratory failure  2  CHF W/exacerbation  3  COVID 19 infection  4  Chronic tracheostomy  5  Hx empyema s/p VATS decortication  6  Former tobacco abuse  7  Suspect chronic bronchitis/COPD    Recommendations  · Treatment for COVID-19 infection per the mild pathway/protocol including Decadron, remdesivir, unfractionated heparin  · Continue nebulized atrovent/xopenex tid,  · Starting diuresis today per the primary team      Subjective:   No overnight acute events  Still with intermittent chest tightness/congestion  States that nebs treatment helps  Vitals: Blood pressure (!) 173/92, pulse 56, temperature 98 5 °F (36 9 °C), resp  rate 16, SpO2 97 %  , There is no height or weight on file to calculate BMI  Intake/Output Summary (Last 24 hours) at 8/28/2022 1254  Last data filed at 8/27/2022 1630  Gross per 24 hour   Intake 720 ml   Output --   Net 720 ml       Physical Exam  Gen: not in acute distress, pleasant  HEENT:supple, trach in place, no accessory muscle use, no JVD appreciated  Cardiac: RRR, no murmurs appreciated  Lungs: normal respiratory effort, +prologned exp phase, minimal wheeze  Abdomen: soft, nontender, normoactive bowel sounds  Extremities: no edema  Neuro: AAO X3, no focal motor deficit    Labs: I have personally reviewed pertinent lab results  WBCs 11 22>> 13 07>>12 4  NT proBNP 8450 on 8/26  Negative troponin x3    Chest x-ray 8/26-basilar atelectasis/central pulmonary congestion/interstitial congestion peripherally    Chest CT 8/24-bi apical paraseptal emphysema, scattered few cysts, trace pleural effusion          Larisa Hanna MD

## 2022-08-27 NOTE — PLAN OF CARE
Problem: MOBILITY - ADULT  Goal: Maintain or return to baseline ADL function  Description: INTERVENTIONS:  -  Assess patient's ability to carry out ADLs; assess patient's baseline for ADL function and identify physical deficits which impact ability to perform ADLs (bathing, care of mouth/teeth, toileting, grooming, dressing, etc )  - Assess/evaluate cause of self-care deficits   - Assess range of motion  - Assess patient's mobility; develop plan if impaired  - Assess patient's need for assistive devices and provide as appropriate  - Encourage maximum independence but intervene and supervise when necessary  - Involve family in performance of ADLs  - Assess for home care needs following discharge   - Consider OT consult to assist with ADL evaluation and planning for discharge  - Provide patient education as appropriate  Outcome: Progressing  Goal: Maintains/Returns to pre admission functional level  Description: INTERVENTIONS:  - Perform BMAT or MOVE assessment daily    - Set and communicate daily mobility goal to care team and patient/family/caregiver  - Collaborate with rehabilitation services on mobility goals if consulted  - Perform Range of Motion  times a day  - Reposition patient every  hours    - Dangle patient  times a day  - Stand patient  times a day  - Ambulate patient  times a day  - Out of bed to chair  times a day   - Out of bed for meals  times a day  - Out of bed for toileting  - Record patient progress and toleration of activity level   Outcome: Progressing     Problem: Potential for Falls  Goal: Patient will remain free of falls  Description: INTERVENTIONS:  - Educate patient/family on patient safety including physical limitations  - Instruct patient to call for assistance with activity   - Consult OT/PT to assist with strengthening/mobility   - Keep Call bell within reach  - Keep bed low and locked with side rails adjusted as appropriate  - Keep care items and personal belongings within reach  - Initiate and maintain comfort rounds  - Make Fall Risk Sign visible to staff  - Offer Toileting every  Hours, in advance of need  - Initiate/Maintain alarm  - Obtain necessary fall risk management equipment:  Apply yellow socks and bracelet for high fall risk patients  - Consider moving patient to room near nurses station  Outcome: Progressing     Problem: Prexisting or High Potential for Compromised Skin Integrity  Goal: Skin integrity is maintained or improved  Description: INTERVENTIONS:  - Identify patients at risk for skin breakdown  - Assess and monitor skin integrity  - Assess and monitor nutrition and hydration status  - Monitor labs   - Assess for incontinence   - Turn and reposition patient  - Assist with mobility/ambulation  - Relieve pressure over bony prominences  - Avoid friction and shearing  - Provide appropriate hygiene as needed including keeping skin clean and dry  - Evaluate need for skin moisturizer/barrier cream  - Collaborate with interdisciplinary team   - Patient/family teaching  - Consider wound care consult   Outcome: Progressing     Problem: RESPIRATORY - ADULT  Goal: Achieves optimal ventilation and oxygenation  Description: INTERVENTIONS:  - Assess for changes in respiratory status  - Assess for changes in mentation and behavior  - Position to facilitate oxygenation and minimize respiratory effort  - Oxygen administered by appropriate delivery if ordered  - Initiate smoking cessation education as indicated  - Encourage broncho-pulmonary hygiene including cough, deep breathe, Incentive Spirometry  - Assess the need for suctioning and aspirate as needed  - Assess and instruct to report SOB or any respiratory difficulty  - Respiratory Therapy support as indicated  Outcome: Progressing     Problem: Nutrition/Hydration-ADULT  Goal: Nutrient/Hydration intake appropriate for improving, restoring or maintaining nutritional needs  Description: Monitor and assess patient's nutrition/hydration status for malnutrition  Collaborate with interdisciplinary team and initiate plan and interventions as ordered  Monitor patient's weight and dietary intake as ordered or per policy  Utilize nutrition screening tool and intervene as necessary  Determine patient's food preferences and provide high-protein, high-caloric foods as appropriate       INTERVENTIONS:  - Monitor oral intake, urinary output, labs, and treatment plans  - Assess nutrition and hydration status and recommend course of action  - Evaluate amount of meals eaten  - Assist patient with eating if necessary   - Allow adequate time for meals  - Recommend/ encourage appropriate diets, oral nutritional supplements, and vitamin/mineral supplements  - Order, calculate, and assess calorie counts as needed  - Recommend, monitor, and adjust tube feedings and TPN/PPN based on assessed needs  - Assess need for intravenous fluids  - Provide specific nutrition/hydration education as appropriate  - Include patient/family/caregiver in decisions related to nutrition  Outcome: Progressing

## 2022-08-27 NOTE — PLAN OF CARE
Problem: MOBILITY - ADULT  Goal: Maintain or return to baseline ADL function  Description: INTERVENTIONS:  -  Assess patient's ability to carry out ADLs; assess patient's baseline for ADL function and identify physical deficits which impact ability to perform ADLs (bathing, care of mouth/teeth, toileting, grooming, dressing, etc )  - Assess/evaluate cause of self-care deficits   - Assess range of motion  - Assess patient's mobility; develop plan if impaired  - Assess patient's need for assistive devices and provide as appropriate  - Encourage maximum independence but intervene and supervise when necessary  - Involve family in performance of ADLs  - Assess for home care needs following discharge   - Consider OT consult to assist with ADL evaluation and planning for discharge  - Provide patient education as appropriate  Outcome: Progressing  Goal: Maintains/Returns to pre admission functional level  Description: INTERVENTIONS:  - Perform BMAT or MOVE assessment daily    - Set and communicate daily mobility goal to care team and patient/family/caregiver  - Collaborate with rehabilitation services on mobility goals if consulted  - Perform Range of Motion 2 times a day  - Reposition patient every 2 hours    - Dangle patient 2 times a day  - Stand patient 2 times a day  - Ambulate patient 3 times a day  - Out of bed to chair 3 times a day   - Out of bed for meals 3 times a day  - Out of bed for toileting  - Record patient progress and toleration of activity level   Outcome: Progressing     Problem: Potential for Falls  Goal: Patient will remain free of falls  Description: INTERVENTIONS:  - Educate patient/family on patient safety including physical limitations  - Instruct patient to call for assistance with activity   - Consult OT/PT to assist with strengthening/mobility   - Keep Call bell within reach  - Keep bed low and locked with side rails adjusted as appropriate  - Keep care items and personal belongings within reach  - Initiate and maintain comfort rounds  - Make Fall Risk Sign visible to staff  - Offer Toileting every 2 Hours, in advance of need  - Initiate/Maintain bed alarm  - Obtain necessary fall risk management equipment: bed alarm  - Apply yellow socks and bracelet for high fall risk patients  - Consider moving patient to room near nurses station  Outcome: Progressing     Problem: Prexisting or High Potential for Compromised Skin Integrity  Goal: Skin integrity is maintained or improved  Description: INTERVENTIONS:  - Identify patients at risk for skin breakdown  - Assess and monitor skin integrity  - Assess and monitor nutrition and hydration status  - Monitor labs   - Assess for incontinence   - Turn and reposition patient  - Assist with mobility/ambulation  - Relieve pressure over bony prominences  - Avoid friction and shearing  - Provide appropriate hygiene as needed including keeping skin clean and dry  - Evaluate need for skin moisturizer/barrier cream  - Collaborate with interdisciplinary team   - Patient/family teaching  - Consider wound care consult   Outcome: Progressing     Problem: RESPIRATORY - ADULT  Goal: Achieves optimal ventilation and oxygenation  Description: INTERVENTIONS:  - Assess for changes in respiratory status  - Assess for changes in mentation and behavior  - Position to facilitate oxygenation and minimize respiratory effort  - Oxygen administered by appropriate delivery if ordered  - Initiate smoking cessation education as indicated  - Encourage broncho-pulmonary hygiene including cough, deep breathe, Incentive Spirometry  - Assess the need for suctioning and aspirate as needed  - Assess and instruct to report SOB or any respiratory difficulty  - Respiratory Therapy support as indicated  Outcome: Progressing     Problem: Nutrition/Hydration-ADULT  Goal: Nutrient/Hydration intake appropriate for improving, restoring or maintaining nutritional needs  Description: Monitor and assess patient's nutrition/hydration status for malnutrition  Collaborate with interdisciplinary team and initiate plan and interventions as ordered  Monitor patient's weight and dietary intake as ordered or per policy  Utilize nutrition screening tool and intervene as necessary  Determine patient's food preferences and provide high-protein, high-caloric foods as appropriate       INTERVENTIONS:  - Monitor oral intake, urinary output, labs, and treatment plans  - Assess nutrition and hydration status and recommend course of action  - Evaluate amount of meals eaten  - Assist patient with eating if necessary   - Allow adequate time for meals  - Recommend/ encourage appropriate diets, oral nutritional supplements, and vitamin/mineral supplements  - Order, calculate, and assess calorie counts as needed  - Recommend, monitor, and adjust tube feedings and TPN/PPN based on assessed needs  - Assess need for intravenous fluids  - Provide specific nutrition/hydration education as appropriate  - Include patient/family/caregiver in decisions related to nutrition  Outcome: Progressing

## 2022-08-27 NOTE — PLAN OF CARE
Problem: MOBILITY - ADULT  Goal: Maintain or return to baseline ADL function  Description: INTERVENTIONS:  -  Assess patient's ability to carry out ADLs; assess patient's baseline for ADL function and identify physical deficits which impact ability to perform ADLs (bathing, care of mouth/teeth, toileting, grooming, dressing, etc )  - Assess/evaluate cause of self-care deficits   - Assess range of motion  - Assess patient's mobility; develop plan if impaired  - Assess patient's need for assistive devices and provide as appropriate  - Encourage maximum independence but intervene and supervise when necessary  - Involve family in performance of ADLs  - Assess for home care needs following discharge   - Consider OT consult to assist with ADL evaluation and planning for discharge  - Provide patient education as appropriate  8/27/2022 1558 by Cuate Sawant RN  Outcome: Adequate for Discharge  8/27/2022 1558 by Cuate Sawant RN  Outcome: Progressing  Goal: Maintains/Returns to pre admission functional level  Description: INTERVENTIONS:  - Perform BMAT or MOVE assessment daily    - Set and communicate daily mobility goal to care team and patient/family/caregiver  - Collaborate with rehabilitation services on mobility goals if consulted  - Perform Range of Motion 2 times a day  - Reposition patient every 2 hours    - Dangle patient 2 times a day  - Stand patient 2 times a day  - Ambulate patient 3 times a day  - Out of bed to chair 3 times a day   - Out of bed for meals 3 times a day  - Out of bed for toileting  - Record patient progress and toleration of activity level   8/27/2022 1558 by Cuate Sawant RN  Outcome: Adequate for Discharge  8/27/2022 1558 by Cuate Sawant RN  Outcome: Progressing     Problem: Potential for Falls  Goal: Patient will remain free of falls  Description: INTERVENTIONS:  - Educate patient/family on patient safety including physical limitations  - Instruct patient to call for assistance with activity   - Consult OT/PT to assist with strengthening/mobility   - Keep Call bell within reach  - Keep bed low and locked with side rails adjusted as appropriate  - Keep care items and personal belongings within reach  - Initiate and maintain comfort rounds  - Make Fall Risk Sign visible to staff  - Offer Toileting every 2 Hours, in advance of need  - Initiate/Maintain bed alarm  - Obtain necessary fall risk management equipment: bed alarm  - Apply yellow socks and bracelet for high fall risk patients  - Consider moving patient to room near nurses station  8/27/2022 1558 by Mendel Rao, RN  Outcome: Adequate for Discharge  8/27/2022 1558 by Mendel Rao, RN  Outcome: Progressing     Problem: Prexisting or High Potential for Compromised Skin Integrity  Goal: Skin integrity is maintained or improved  Description: INTERVENTIONS:  - Identify patients at risk for skin breakdown  - Assess and monitor skin integrity  - Assess and monitor nutrition and hydration status  - Monitor labs   - Assess for incontinence   - Turn and reposition patient  - Assist with mobility/ambulation  - Relieve pressure over bony prominences  - Avoid friction and shearing  - Provide appropriate hygiene as needed including keeping skin clean and dry  - Evaluate need for skin moisturizer/barrier cream  - Collaborate with interdisciplinary team   - Patient/family teaching  - Consider wound care consult   8/27/2022 1558 by Mendel Rao, RN  Outcome: Progressing  8/27/2022 1558 by Mendel Rao, RN  Outcome: Progressing     Problem: RESPIRATORY - ADULT  Goal: Achieves optimal ventilation and oxygenation  Description: INTERVENTIONS:  - Assess for changes in respiratory status  - Assess for changes in mentation and behavior  - Position to facilitate oxygenation and minimize respiratory effort  - Oxygen administered by appropriate delivery if ordered  - Initiate smoking cessation education as indicated  - Encourage broncho-pulmonary hygiene including cough, deep breathe, Incentive Spirometry  - Assess the need for suctioning and aspirate as needed  - Assess and instruct to report SOB or any respiratory difficulty  - Respiratory Therapy support as indicated  8/27/2022 1558 by Tom Shane RN  Outcome: Progressing  8/27/2022 1558 by Tom Shane RN  Outcome: Progressing     Problem: Nutrition/Hydration-ADULT  Goal: Nutrient/Hydration intake appropriate for improving, restoring or maintaining nutritional needs  Description: Monitor and assess patient's nutrition/hydration status for malnutrition  Collaborate with interdisciplinary team and initiate plan and interventions as ordered  Monitor patient's weight and dietary intake as ordered or per policy  Utilize nutrition screening tool and intervene as necessary  Determine patient's food preferences and provide high-protein, high-caloric foods as appropriate       INTERVENTIONS:  - Monitor oral intake, urinary output, labs, and treatment plans  - Assess nutrition and hydration status and recommend course of action  - Evaluate amount of meals eaten  - Assist patient with eating if necessary   - Allow adequate time for meals  - Recommend/ encourage appropriate diets, oral nutritional supplements, and vitamin/mineral supplements  - Order, calculate, and assess calorie counts as needed  - Recommend, monitor, and adjust tube feedings and TPN/PPN based on assessed needs  - Assess need for intravenous fluids  - Provide specific nutrition/hydration education as appropriate  - Include patient/family/caregiver in decisions related to nutrition  8/27/2022 1558 by Tom Shane RN  Outcome: Progressing  8/27/2022 1558 by Tom Shane RN  Outcome: Progressing

## 2022-08-27 NOTE — PROGRESS NOTES
Progress Note - Pulmonary   Kelli Red 65 y o  female MRN: [id number]  Unit/Bed#: S -01 Encounter: [id number]    Assessment & Plan:  Acute on chronic hypoxic respiratory failure  COVID-19 pneumonia  Tracheostomy dependent  Recent empyema status post VATS decortication with prolonged antibiotic therapy  Suspected COPD without acute exacerbation  CHF  HX cardiac arrest    · Patient currently requiring 8 L by trach collar  Typically uses 10 L at baseline  Maintain pulse ox greater than 88%  · Continue pulmonary toileting  · Continue treatment for COVID 19 protocol including Decadron, remdesivir, heparin drip, antibiotics Rocephin and doxycycline  · Procalcitonin trending down  · Recommend diuresis  · Please call Pulmonary if any additional questions over the weekend    Subjective:   Patient reports her breathing is better today than it was yesterday  She denies any new concerns or complaints  Objective:     Vitals: Blood pressure 155/89, pulse 58, temperature 98 1 °F (36 7 °C), resp  rate 16, SpO2 99 %  ,There is no height or weight on file to calculate BMI  Intake/Output Summary (Last 24 hours) at 8/27/2022 1120  Last data filed at 8/27/2022 0200  Gross per 24 hour   Intake 0 ml   Output 500 ml   Net -500 ml       Invasive Devices  Report    Peripherally Inserted Central Catheter Line  Duration           PICC Line 08/11/22 Right Brachial 16 days          Peripheral Intravenous Line  Duration           Peripheral IV 08/24/22 Right Antecubital 2 days          Airway  Duration           Surgical Airway Shiley Fenestrated 178 days                Physical Exam:   General appearance: Alert and awake, in no acute distress  Head: Normocephalic, without obvious abnormality, atraumatic  Eyes: EOMI  No discharge bilaterally  No scleral icterus     Neck: trach present, no secretions noted  Lungs: +Rhonchi  Heart: Regular rate and rhythm, S1, S2 normal, no murmur  Abdomen:  No appreciable distension or tenderness  Extremities: No edema or tenderness  Skin: Warm and dry  Neurologic: No acute focal deficits are noted    Labs: I have personally reviewed pertinent lab results  Imaging and other studies: I have personally reviewed pertinent reports

## 2022-08-27 NOTE — PROGRESS NOTES
[facility]  Progress Note - [place] 1965, 65 y o  female MRN: [id number]  Unit/Bed#: S -01 Encounter: [id number]  Primary Care Provider: Francois Valderrama MD   Date and time admitted to hospital: 8/24/2022  9:34 AM    DVT (deep venous thrombosis) (Formerly [facility])  Assessment & Plan  Venous doppler  evidence of focal chronic vs  subacute non-occlusive deep vein  thrombosis noted in the soleal veins at the mid calf  D dimer:6 2    Plan  Hematology consulted  Chronic heart failure with preserved ejection fraction [facility])  Assessment & Plan  Wt Readings from Last 3 Encounters:   08/23/22 80 8 kg (178 lb 3 2 oz)   08/19/22 80 8 kg (178 lb 3 2 oz)   08/18/22 81 1 kg (178 lb 12 7 oz)     · Patient had an admission to [facility] in July for CHF exacerbation  · At that time was maintained on Bumex 2 mg daily and Aldactone 100 mg daily  · Following discharge from [facility] after VATS decortication, it appears Bumex had been discontinued on 08/13 and Aldactone decreased to 50 mg daily  · Review of med list shows patient was not discharged on diuretics  · Chest x-ray today shows mild to moderate pulmonary vascular congestion and small right pleural effusion  · Last echocardiogram was in February of 2022 which showed EF 85%, normal diastolic function  Will check an echocardiogram this admission    Plan  · Cardiology on board  · Received Lasix 60 mg 1 dose at ED  · Monitor for worsening heart failure      Chronic kidney disease  Assessment & Plan  Lab Results   Component Value Date    EGFR 57 08/26/2022    EGFR 51 08/25/2022    EGFR 44 08/24/2022    CREATININE 1 08 08/26/2022    CREATININE 1 18 08/25/2022    CREATININE 1 34 (H) 08/24/2022     · Baseline creatinine around 0 9-1 0 based on review of most recent admission  · Creatinine elevated at 1 34 on admission but back to baseline today  · Will continue to monitor closely while on diuretics    COVID-19  Assessment & Plan  · POA, initially tested positive for COVID on 5/19/22 and again today 8/24/22  · Doubt current symptoms related to COVID in view of sudden onset with almost immediate resolution  · Complained of thick sputum whitish to yellowish in color, NO fever or SOB, On 10 l oxygen, saturation 98%    Plan  · Will monitor per mild COVID pathway  · Contionue Remdesivir and Dexamethasone  · respi  protocol  Tracheostomy in place [facility])  Assessment & Plan  · Also with history of subglottic stenosis  · Continue routine trach care  · Outpatient follow-up with pulmonology/ENT    History of Cardiac arrest [facility])  Assessment & Plan  · Continue home medications, monitor on telemetry    Type 2 diabetes mellitus with hyperglycemia [facility])  Assessment & Plan  Lab Results   Component Value Date    HGBA1C 8 1 (H) 08/24/2022       Recent Labs     08/25/22  0727 08/25/22  1045 08/25/22  1610 08/25/22  2132   POCGLU 64* 219* 296* 343*     Lantus increased to 30 Units  Premeal insulin increased to 10 U TID  Blood Sugar Average: Last 72 hrs:  · (P) [phone] [id number]   · Has been uncontrolled based on most recent hemoglobin A1c results  · She will be continued on basal insulin per most recent discharge doses  · Accu-Cheks a c  HS with correctional scale insulin    * Acute Respiratory insufficiency on chronic hypoxic respiratory failure  Assessment & Plan  · POA, presented from rehab with acute onset respiratory distress and hypoxia shortly after eating  · Symptoms currently resolved and patient feels back at baseline  · Recently discharged from the hospital to rehab on 8/18/22 following admission for right empyema with initial mariajose failed chest tube management and subsequently requiring VATS decortication on 8/6/22  · Has trach in place from prior admissions  Denies increased secretions  · CT chest shows bilateral consolidations which may represent infection     · Will continue supplemental trach collar O2 to maintain sats > 90%  · She is on 10 l of oxygen today  Plan  · Continue nebs, respiratory protocol  · Continue Remdesivir, Dexamethasone  · Pulmonary on board        VTE Pharmacologic Prophylaxis: VTE Score: 3 Moderate Risk (Score 3-4) - Pharmacological DVT Prophylaxis Ordered: heparin drip  Patient Centered Rounds: I performed bedside rounds with nursing staff today  Discussions with Specialists or Other Care Team Provider: attending, senior,     Education and Discussions with Family / Patient: will call later  Current Length of Stay: 3 day(s)  Current Patient Status: Inpatient   Discharge Plan: Anticipate discharge in 24-48 hrs     Code Status: Level 1 - Full Code    Subjective:   No acute events overnight  Pt is hemodynamically stable  Pt express no new complains  C/w current treatment  Insulin regimen adjusted, will monitor  Chest tube stitches removed  Chest tube placement wound is healed, dry, no erythema noticed, no discharge  Stiches removed fully without breakage  On Trach colar 8 l  Pulmonology is following  Objective:     Vitals:   Temp (24hrs), Av 1 °F (36 7 °C), Min:98 °F (36 7 °C), Max:98 1 °F (36 7 °C)    Temp:  [98 °F (36 7 °C)-98 1 °F (36 7 °C)] 98 1 °F (36 7 °C)  HR:  [58-66] 58  Resp:  [16] 16  BP: (135-168)/(73-89) 155/89  SpO2:  [95 %-99 %] 99 %  There is no height or weight on file to calculate BMI  Input and Output Summary (last 24 hours): Intake/Output Summary (Last 24 hours) at 2022 1132  Last data filed at 2022 0200  Gross per 24 hour   Intake 0 ml   Output 500 ml   Net -500 ml       Physical Exam:   Physical Exam  Constitutional:       Appearance: She is obese  HENT:      Head: Normocephalic and atraumatic  Nose: Nose normal       Mouth/Throat:      Mouth: Mucous membranes are moist    Eyes:      Conjunctiva/sclera: Conjunctivae normal    Cardiovascular:      Rate and Rhythm: Normal rate  Pulses: Normal pulses     Pulmonary: Effort: Pulmonary effort is normal       Comments: Trach in place  Abdominal:      General: Bowel sounds are normal       Palpations: Abdomen is soft  Musculoskeletal:         General: Normal range of motion  Skin:     General: Skin is warm  Neurological:      General: No focal deficit present  Mental Status: She is alert  Psychiatric:         Mood and Affect: Mood normal           Additional Data:     Labs:  Results from last 7 days   Lab Units 08/27/22  0232 08/25/22  0436 08/24/22  0951   WBC Thousand/uL 11 22*   < > 10 32*   HEMOGLOBIN g/dL 7 4*   < > 9 3*   HEMATOCRIT % 25 0*   < > 31 2*   PLATELETS Thousands/uL 427*   < > 574*   NEUTROS PCT %  --   --  76*   LYMPHS PCT %  --   --  15   MONOS PCT %  --   --  6   EOS PCT %  --   --  2    < > = values in this interval not displayed       Results from last 7 days   Lab Units 08/27/22  0231   SODIUM mmol/L 141   POTASSIUM mmol/L 4 0   CHLORIDE mmol/L 108   CO2 mmol/L 27   BUN mg/dL 34*   CREATININE mg/dL 1 16   ANION GAP mmol/L 6   CALCIUM mg/dL 8 1*   ALBUMIN g/dL 2 6*   TOTAL BILIRUBIN mg/dL 0 28   ALK PHOS U/L 111*   ALT U/L 10   AST U/L 9*   GLUCOSE RANDOM mg/dL 305*     Results from last 7 days   Lab Units 08/26/22  1111   INR  1 34*     Results from last 7 days   Lab Units 08/27/22  1123 08/27/22  0814 08/26/22  2052 08/26/22  1603 08/26/22  1106 08/26/22  0824 08/25/22  2132 08/25/22  1610 08/25/22  1045 08/25/22  0727 08/24/22  2141 08/24/22  1828   POC GLUCOSE mg/dl 234* 195* 400* 350* 335* 228* 343* 296* 219* 64* 218* 142*     Results from last 7 days   Lab Units 08/24/22  0951   HEMOGLOBIN A1C % 8 1*     Results from last 7 days   Lab Units 08/26/22  0451 08/25/22  1054 08/24/22  0951   LACTIC ACID mmol/L  --   --  1 8   PROCALCITONIN ng/ml 0 77* 1 00* 0 11       Lines/Drains:  Invasive Devices  Report    Peripherally Inserted Central Catheter Line  Duration           PICC Line 08/11/22 Right Brachial 16 days          Peripheral Intravenous Line  Duration           Peripheral IV 08/24/22 Right Antecubital 2 days          Airway  Duration           Surgical Airway Shiley Fenestrated 178 days                Central Line:  Goal for removal: Will discontinue when meds requiring line are completed  Telemetry:  Telemetry Orders (From admission, onward)             48 Hour Telemetry Monitoring  Continuous x 48 hours        References:    Telemetry Guidelines   Question:  Reason for 48 Hour Telemetry  Answer:  Acute Decompensated CHF (continuous diuretic infusion or total diuretic dose > 200 mg daily, associated electrolyte derangement, ionotropic drip, history of ventricular arrhythmia, or new EF <35%)                 Telemetry Reviewed: Normal Sinus Rhythm  Indication for Continued Telemetry Use: No indication for continued use  Will discontinue  Imaging: No pertinent imaging reviewed  Recent Cultures (last 7 days):   Results from last 7 days   Lab Units 08/24/22  0951   BLOOD CULTURE  No Growth at 48 hrs  No Growth at 48 hrs         Last 24 Hours Medication List:   Current Facility-Administered Medications   Medication Dose Route Frequency Provider Last Rate    acetaminophen  975 mg Oral Q8H [facility] Treasure Villalta MD      albuterol  2 puff Inhalation Q4H PRN Emma Thompson MD      amiodarone  100 mg Oral Daily With Breakfast Treasure Villalta MD      atorvastatin  40 mg Oral Daily With Brian Landa MD      benzonatate  100 mg Oral TID PRN Emma Thompson MD      dexamethasone  6 mg Intravenous Q24H Tommie Hunt MD      escitalopram  10 mg Oral Daily Treasure Villalta MD      ferrous sulfate  325 mg Oral Daily With Breakfast Treasure Villalta MD      heparin (porcine)  3-20 Units/kg/hr (Order-Specific) Intravenous Titrated Simon Herndon MD 16 Units/kg/hr (08/27/22 0912)    heparin (porcine)  2,000 Units Intravenous Q1H PRN Simon Herndon MD  heparin (porcine)  4,000 Units Intravenous Q1H PRN Allyson Harvey MD      hydrOXYzine HCL  25 mg Oral Q6H PRN Pamela Quezada MD      insulin glargine  30 Units Subcutaneous HS Argenis Senior MD      insulin lispro  1-5 Units Subcutaneous HS Treasure Nolan MD      insulin lispro  1-6 Units Subcutaneous TID AC Treasure Nolan MD      insulin lispro  10 Units Subcutaneous TID With Meals Argenis Senior MD      levalbuterol  0 63 mg Nebulization TID PRN Tani Pierson MD      lidocaine  2 patch Topical Daily Treasure Nolan MD      LORazepam  0 5 mg Oral Q8H PRN Pamela Quezada MD      metoprolol succinate  50 mg Oral Q12H Treasure Nolan MD      mirtazapine  7 5 mg Oral HS Treasure Nolan MD      pantoprazole  40 mg Oral Early Morning Treasure Nolan MD      polyethylene glycol  17 g Oral Daily Treasure Nolan MD      pregabalin  75 mg Oral Daily Treasure Nolan MD      remdesivir  100 mg Intravenous Q24H Milvia Cleary MD      sodium chloride  3 mL Nebulization TID PRN Tani Pierson MD      ticagrelor  90 mg Oral Q12H [address] Pamela Quezada MD          Today, Patient Was Seen By: Argenis Senior MD    **Please Note: This note may have been constructed using a voice recognition system  **

## 2022-08-28 LAB
ANION GAP SERPL CALCULATED.3IONS-SCNC: 5 MMOL/L (ref 4–13)
APTT PPP: 75 SECONDS (ref 23–37)
BASOPHILS # BLD AUTO: 0.03 THOUSANDS/ΜL (ref 0–0.1)
BASOPHILS NFR BLD AUTO: 0 % (ref 0–1)
BUN SERPL-MCNC: 31 MG/DL (ref 5–25)
CALCIUM SERPL-MCNC: 7.9 MG/DL (ref 8.4–10.2)
CHLORIDE SERPL-SCNC: 109 MMOL/L (ref 96–108)
CO2 SERPL-SCNC: 27 MMOL/L (ref 21–32)
CREAT SERPL-MCNC: 0.96 MG/DL (ref 0.6–1.3)
EOSINOPHIL # BLD AUTO: 0.01 THOUSAND/ΜL (ref 0–0.61)
EOSINOPHIL NFR BLD AUTO: 0 % (ref 0–6)
ERYTHROCYTE [DISTWIDTH] IN BLOOD BY AUTOMATED COUNT: 18.8 % (ref 11.6–15.1)
GFR SERPL CREATININE-BSD FRML MDRD: 66 ML/MIN/1.73SQ M
GLUCOSE SERPL-MCNC: 164 MG/DL (ref 65–140)
GLUCOSE SERPL-MCNC: 172 MG/DL (ref 65–140)
GLUCOSE SERPL-MCNC: 184 MG/DL (ref 65–140)
GLUCOSE SERPL-MCNC: 246 MG/DL (ref 65–140)
GLUCOSE SERPL-MCNC: 252 MG/DL (ref 65–140)
HCT VFR BLD AUTO: 25.3 % (ref 34.8–46.1)
HGB BLD-MCNC: 7.6 G/DL (ref 11.5–15.4)
IMM GRANULOCYTES # BLD AUTO: 0.19 THOUSAND/UL (ref 0–0.2)
IMM GRANULOCYTES NFR BLD AUTO: 2 % (ref 0–2)
LYMPHOCYTES # BLD AUTO: 1.62 THOUSANDS/ΜL (ref 0.6–4.47)
LYMPHOCYTES NFR BLD AUTO: 13 % (ref 14–44)
MCH RBC QN AUTO: 25.1 PG (ref 26.8–34.3)
MCHC RBC AUTO-ENTMCNC: 30 G/DL (ref 31.4–37.4)
MCV RBC AUTO: 84 FL (ref 82–98)
MONOCYTES # BLD AUTO: 0.91 THOUSAND/ΜL (ref 0.17–1.22)
MONOCYTES NFR BLD AUTO: 7 % (ref 4–12)
NEUTROPHILS # BLD AUTO: 9.64 THOUSANDS/ΜL (ref 1.85–7.62)
NEUTS SEG NFR BLD AUTO: 78 % (ref 43–75)
NRBC BLD AUTO-RTO: 0 /100 WBCS
PLATELET # BLD AUTO: 433 THOUSANDS/UL (ref 149–390)
PMV BLD AUTO: 10.8 FL (ref 8.9–12.7)
POTASSIUM SERPL-SCNC: 3.9 MMOL/L (ref 3.5–5.3)
RBC # BLD AUTO: 3.03 MILLION/UL (ref 3.81–5.12)
SODIUM SERPL-SCNC: 141 MMOL/L (ref 135–147)
WBC # BLD AUTO: 12.4 THOUSAND/UL (ref 4.31–10.16)

## 2022-08-28 PROCEDURE — 85730 THROMBOPLASTIN TIME PARTIAL: CPT | Performed by: INTERNAL MEDICINE

## 2022-08-28 PROCEDURE — 85025 COMPLETE CBC W/AUTO DIFF WBC: CPT | Performed by: STUDENT IN AN ORGANIZED HEALTH CARE EDUCATION/TRAINING PROGRAM

## 2022-08-28 PROCEDURE — 80048 BASIC METABOLIC PNL TOTAL CA: CPT | Performed by: STUDENT IN AN ORGANIZED HEALTH CARE EDUCATION/TRAINING PROGRAM

## 2022-08-28 PROCEDURE — 99232 SBSQ HOSP IP/OBS MODERATE 35: CPT | Performed by: INTERNAL MEDICINE

## 2022-08-28 PROCEDURE — 94760 N-INVAS EAR/PLS OXIMETRY 1: CPT

## 2022-08-28 PROCEDURE — 94640 AIRWAY INHALATION TREATMENT: CPT

## 2022-08-28 PROCEDURE — 82948 REAGENT STRIP/BLOOD GLUCOSE: CPT

## 2022-08-28 RX ORDER — FUROSEMIDE 40 MG/1
40 TABLET ORAL
Status: DISCONTINUED | OUTPATIENT
Start: 2022-08-28 | End: 2022-08-30 | Stop reason: HOSPADM

## 2022-08-28 RX ORDER — OXYCODONE HYDROCHLORIDE AND ACETAMINOPHEN 5; 325 MG/1; MG/1
1 TABLET ORAL EVERY 4 HOURS PRN
Status: DISCONTINUED | OUTPATIENT
Start: 2022-08-28 | End: 2022-08-30 | Stop reason: HOSPADM

## 2022-08-28 RX ADMIN — LEVALBUTEROL HYDROCHLORIDE 1.25 MG: 1.25 SOLUTION, CONCENTRATE RESPIRATORY (INHALATION) at 08:25

## 2022-08-28 RX ADMIN — FUROSEMIDE 40 MG: 40 TABLET ORAL at 15:31

## 2022-08-28 RX ADMIN — INSULIN LISPRO 2 UNITS: 100 INJECTION, SOLUTION INTRAVENOUS; SUBCUTANEOUS at 21:22

## 2022-08-28 RX ADMIN — DEXAMETHASONE SODIUM PHOSPHATE 6 MG: 4 INJECTION INTRA-ARTICULAR; INTRALESIONAL; INTRAMUSCULAR; INTRAVENOUS; SOFT TISSUE at 09:28

## 2022-08-28 RX ADMIN — ESCITALOPRAM OXALATE 10 MG: 10 TABLET ORAL at 09:21

## 2022-08-28 RX ADMIN — REMDESIVIR 100 MG: 100 INJECTION, POWDER, LYOPHILIZED, FOR SOLUTION INTRAVENOUS at 09:28

## 2022-08-28 RX ADMIN — LEVALBUTEROL HYDROCHLORIDE 1.25 MG: 1.25 SOLUTION, CONCENTRATE RESPIRATORY (INHALATION) at 15:09

## 2022-08-28 RX ADMIN — MIRTAZAPINE 7.5 MG: 15 TABLET, FILM COATED ORAL at 21:22

## 2022-08-28 RX ADMIN — METOPROLOL SUCCINATE 50 MG: 50 TABLET, EXTENDED RELEASE ORAL at 15:30

## 2022-08-28 RX ADMIN — METOPROLOL SUCCINATE 50 MG: 50 TABLET, EXTENDED RELEASE ORAL at 04:30

## 2022-08-28 RX ADMIN — INSULIN LISPRO 12 UNITS: 100 INJECTION, SOLUTION INTRAVENOUS; SUBCUTANEOUS at 12:17

## 2022-08-28 RX ADMIN — INSULIN LISPRO 1 UNITS: 100 INJECTION, SOLUTION INTRAVENOUS; SUBCUTANEOUS at 12:17

## 2022-08-28 RX ADMIN — INSULIN LISPRO 12 UNITS: 100 INJECTION, SOLUTION INTRAVENOUS; SUBCUTANEOUS at 09:23

## 2022-08-28 RX ADMIN — LEVALBUTEROL HYDROCHLORIDE 1.25 MG: 1.25 SOLUTION, CONCENTRATE RESPIRATORY (INHALATION) at 19:24

## 2022-08-28 RX ADMIN — TICAGRELOR 90 MG: 90 TABLET ORAL at 09:21

## 2022-08-28 RX ADMIN — IPRATROPIUM BROMIDE 0.5 MG: 0.5 SOLUTION RESPIRATORY (INHALATION) at 19:24

## 2022-08-28 RX ADMIN — ATORVASTATIN CALCIUM 40 MG: 40 TABLET, FILM COATED ORAL at 17:17

## 2022-08-28 RX ADMIN — LIDOCAINE 5% 2 PATCH: 700 PATCH TOPICAL at 09:22

## 2022-08-28 RX ADMIN — INSULIN LISPRO 3 UNITS: 100 INJECTION, SOLUTION INTRAVENOUS; SUBCUTANEOUS at 17:17

## 2022-08-28 RX ADMIN — INSULIN LISPRO 12 UNITS: 100 INJECTION, SOLUTION INTRAVENOUS; SUBCUTANEOUS at 17:17

## 2022-08-28 RX ADMIN — PANTOPRAZOLE SODIUM 40 MG: 40 TABLET, DELAYED RELEASE ORAL at 04:30

## 2022-08-28 RX ADMIN — INSULIN LISPRO 1 UNITS: 100 INJECTION, SOLUTION INTRAVENOUS; SUBCUTANEOUS at 09:23

## 2022-08-28 RX ADMIN — OXYCODONE HYDROCHLORIDE AND ACETAMINOPHEN 1 TABLET: 5; 325 TABLET ORAL at 17:17

## 2022-08-28 RX ADMIN — IPRATROPIUM BROMIDE 0.5 MG: 0.5 SOLUTION RESPIRATORY (INHALATION) at 15:09

## 2022-08-28 RX ADMIN — PREGABALIN 75 MG: 75 CAPSULE ORAL at 09:21

## 2022-08-28 RX ADMIN — HEPARIN SODIUM 18 UNITS/KG/HR: 10000 INJECTION, SOLUTION INTRAVENOUS at 19:10

## 2022-08-28 RX ADMIN — INSULIN GLARGINE 30 UNITS: 100 INJECTION, SOLUTION SUBCUTANEOUS at 21:22

## 2022-08-28 RX ADMIN — AMIODARONE HYDROCHLORIDE 100 MG: 200 TABLET ORAL at 09:21

## 2022-08-28 RX ADMIN — IPRATROPIUM BROMIDE 0.5 MG: 0.5 SOLUTION RESPIRATORY (INHALATION) at 08:25

## 2022-08-28 RX ADMIN — TICAGRELOR 90 MG: 90 TABLET ORAL at 21:22

## 2022-08-28 NOTE — ASSESSMENT & PLAN NOTE
· POA, presented from rehab with acute onset respiratory distress and hypoxia shortly after eating  · Symptoms currently resolved and patient feels back at baseline  · Recently discharged from the hospital to rehab on 8/18/22 following admission for right empyema with initial mariajose failed chest tube management and subsequently requiring VATS decortication on 8/6/22  · Has trach in place from prior admissions  Denies increased secretions  · CT chest shows bilateral consolidations which may represent infection  · Will continue supplemental trach collar O2 to maintain sats > 90%  · She is on 10 l of oxygen today  Plan  · Continue nebs, respiratory protocol  · Continue Remdesivir, Dexamethasone     · Continue Atrovent/Xopenex t i d    · Pulmonary on board

## 2022-08-28 NOTE — ASSESSMENT & PLAN NOTE
Wt Readings from Last 3 Encounters:   08/23/22 80 8 kg (178 lb 3 2 oz)   08/19/22 80 8 kg (178 lb 3 2 oz)   08/18/22 81 1 kg (178 lb 12 7 oz)     · Patient had an admission to [facility] in July for CHF exacerbation  · At that time was maintained on Bumex 2 mg daily and Aldactone 100 mg daily  · Following discharge from [facility] after VATS decortication, it appears Bumex had been discontinued on 08/13 and Aldactone decreased to 50 mg daily  · Review of med list shows patient was not discharged on diuretics  · Chest x-ray today shows mild to moderate pulmonary vascular congestion and small right pleural effusion  · Last echocardiogram was in February of 2022 which showed EF 86%, normal diastolic function  · BNP 8400    Plan  · Cardiology on board  · Received Lasix 60 mg 1 dose at ED  · Start Furosemide 40 mg BID  · Monitor for worsening heart failure  · Monitor daily weights, I/O, Serum creat and electrolytes

## 2022-08-28 NOTE — PROGRESS NOTES
[facility]  Progress Note - Lara Ohm 1965, 65 y o  female MRN: [id number]  Unit/Bed#: S -01 Encounter: [id number]  Primary Care Provider: Courtney Dubose MD   Date and time admitted to hospital: 8/24/2022  9:34 AM    * Acute Respiratory insufficiency on chronic hypoxic respiratory failure  Assessment & Plan  · POA, presented from rehab with acute onset respiratory distress and hypoxia shortly after eating  · Symptoms currently resolved and patient feels back at baseline  · Recently discharged from the hospital to rehab on 8/18/22 following admission for right empyema with initial mariajose failed chest tube management and subsequently requiring VATS decortication on 8/6/22  · Has trach in place from prior admissions  Denies increased secretions  · CT chest shows bilateral consolidations which may represent infection  · Will continue supplemental trach collar O2 to maintain sats > 90%  · She is on 10 l of oxygen today  Plan  · Continue nebs, respiratory protocol  · Continue Remdesivir, Dexamethasone  · Continue Atrovent/Xopenex t i d    · Pulmonary on board      COVID-19  Assessment & Plan  · POA, initially tested positive for COVID on 5/19/22 and again today 8/24/22  · Doubt current symptoms related to COVID in view of sudden onset with almost immediate resolution  · Complained of thick sputum whitish to yellowish in color, NO fever or SOB, On 10 l oxygen, saturation 98%    Plan  · Will monitor per mild COVID pathway  · Contionue Remdesivir and Dexamethasone  · Atrovent/Xopenex t i d    · respi  protocol  DVT (deep venous thrombosis) ([facility])  Assessment & Plan  Venous doppler  evidence of focal chronic vs  subacute non-occlusive deep vein  thrombosis noted in the soleal veins at the mid calf  D dimer:6 2  Patient was compliant with Eliquis    Plan  Hematology consulted      Chronic heart failure with preserved ejection fraction [facility])  Assessment & Plan  Wt Readings from Last 3 Encounters:   08/23/22 80 8 kg (178 lb 3 2 oz)   08/19/22 80 8 kg (178 lb 3 2 oz)   08/18/22 81 1 kg (178 lb 12 7 oz)     · Patient had an admission to [facility] in July for CHF exacerbation  · At that time was maintained on Bumex 2 mg daily and Aldactone 100 mg daily  · Following discharge from [facility] after VATS decortication, it appears Bumex had been discontinued on 08/13 and Aldactone decreased to 50 mg daily  · Review of med list shows patient was not discharged on diuretics  · Chest x-ray today shows mild to moderate pulmonary vascular congestion and small right pleural effusion  · Last echocardiogram was in February of 2022 which showed EF 73%, normal diastolic function  · BNP 8400    Plan  · Cardiology on board  · Received Lasix 60 mg 1 dose at ED  · Start Furosemide 40 mg BID  · Monitor for worsening heart failure  · Monitor daily weights, I/O, Serum creat and electrolytes  Chronic kidney disease  Assessment & Plan  Lab Results   Component Value Date    EGFR 52 08/27/2022    EGFR 57 08/26/2022    EGFR 51 08/25/2022    CREATININE 1 16 08/27/2022    CREATININE 1 08 08/26/2022    CREATININE 1 18 08/25/2022     · Baseline creatinine around 0 9-1 0 based on review of most recent admission  · Creatinine elevated at 1 34 on admission but back to baseline today  · Will continue to monitor closely while on diuretics    Tracheostomy in place [facility])  Assessment & Plan  · Also with history of subglottic stenosis  · Continue routine trach care  · Outpatient follow-up with pulmonology/ENT    History of Cardiac arrest [facility])  Assessment & Plan  · Continue home medications, monitor on telemetry    Type 2 diabetes mellitus with hyperglycemia [facility])  Assessment & Plan  Lab Results   Component Value Date    HGBA1C 8 1 (H) 08/24/2022       Recent Labs     08/27/22  0814 08/27/22  1123 08/27/22  1630 08/27/22 2053   POCGLU 195* 234* 311* 279*     Lantus increased to 30 Units    Premeal insulin increased to 10 U TID  Blood Sugar Average: Last 72 hrs:  · (P) 411 [id number]   · Has been uncontrolled based on most recent hemoglobin A1c results  · She will be continued on basal insulin per most recent discharge doses  · Accu-Cheks a c  HS with correctional scale insulin        VTE Pharmacologic Prophylaxis: VTE Score: 3 Moderate Risk (Score 3-4) - Pharmacological DVT Prophylaxis Ordered: heparin drip  Patient Centered Rounds: I performed bedside rounds with nursing staff today  Discussions with Specialists or Other Care Team Provider: attending, senior,     Education and Discussions with Family / Patient: Called son and updated  Current Length of Stay: 4 day(s)  Current Patient Status: Inpatient   Discharge Plan: Anticipate discharge in 24-48 hrs     Code Status: Level 1 - Full Code    Subjective:   No acute events overnight  Pt is hemodynamically stable  Pt express no new complains  But still having cough and thick sputum  No fever or SOB  She is on 8L of oxygen and sating at 99%  C/w current treatment  Start Furosemide 40 mg  (BNP 8400, CXR: pul  vascular congestion)  Chest tube stitches removed yesterday  Chest tube placement wound is healed, dry, no erythema noticed, no discharge  Stiches removed fully without breakage  Objective:     Vitals:   Temp (24hrs), Av 5 °F (36 9 °C), Min:98 5 °F (36 9 °C), Max:98 5 °F (36 9 °C)    Temp:  [98 5 °F (36 9 °C)] 98 5 °F (36 9 °C)  HR:  [56-63] 56  Resp:  [16-18] 16  BP: (159-173)/(89-92) 173/92  SpO2:  [96 %-98 %] 97 %  There is no height or weight on file to calculate BMI  Input and Output Summary (last 24 hours): Intake/Output Summary (Last 24 hours) at 2022 1036  Last data filed at 2022 1630  Gross per 24 hour   Intake 720 ml   Output --   Net 720 ml       Physical Exam:   Physical Exam  Constitutional:       Appearance: She is obese  HENT:      Head: Normocephalic and atraumatic        Nose: Nose normal       Mouth/Throat:      Mouth: Mucous membranes are moist    Eyes:      Conjunctiva/sclera: Conjunctivae normal    Cardiovascular:      Rate and Rhythm: Normal rate  Pulses: Normal pulses  Pulmonary:      Effort: Pulmonary effort is normal       Comments: Trach in place  Abdominal:      General: Bowel sounds are normal       Palpations: Abdomen is soft  Musculoskeletal:         General: Normal range of motion  Skin:     General: Skin is warm  Neurological:      General: No focal deficit present  Mental Status: She is alert     Psychiatric:         Mood and Affect: Mood normal           Additional Data:     Labs:  Results from last 7 days   Lab Units 08/28/22  0434   WBC Thousand/uL 12 40*   HEMOGLOBIN g/dL 7 6*   HEMATOCRIT % 25 3*   PLATELETS Thousands/uL 433*   NEUTROS PCT % 78*   LYMPHS PCT % 13*   MONOS PCT % 7   EOS PCT % 0     Results from last 7 days   Lab Units 08/28/22  0434 08/27/22  0231   SODIUM mmol/L 141 141   POTASSIUM mmol/L 3 9 4 0   CHLORIDE mmol/L 109* 108   CO2 mmol/L 27 27   BUN mg/dL 31* 34*   CREATININE mg/dL 0 96 1 16   ANION GAP mmol/L 5 6   CALCIUM mg/dL 7 9* 8 1*   ALBUMIN g/dL  --  2 6*   TOTAL BILIRUBIN mg/dL  --  0 28   ALK PHOS U/L  --  111*   ALT U/L  --  10   AST U/L  --  9*   GLUCOSE RANDOM mg/dL 184* 305*     Results from last 7 days   Lab Units 08/26/22  1111   INR  1 34*     Results from last 7 days   Lab Units 08/28/22  0824 08/27/22  2053 08/27/22  1630 08/27/22  1123 08/27/22  0814 08/26/22  2052 08/26/22  1603 08/26/22  1106 08/26/22  0824 08/25/22  2132 08/25/22  1610 08/25/22  1045   POC GLUCOSE mg/dl 164* 279* 311* 234* 195* 400* 350* 335* 228* 343* 296* 219*     Results from last 7 days   Lab Units 08/24/22  0951   HEMOGLOBIN A1C % 8 1*     Results from last 7 days   Lab Units 08/26/22  0451 08/25/22  1054 08/24/22  0951   LACTIC ACID mmol/L  --   --  1 8   PROCALCITONIN ng/ml 0 77* 1 00* 0 11       Lines/Drains:  Invasive Devices  Report Peripherally Inserted Central Catheter Line  Duration           PICC Line 08/11/22 Right Brachial 17 days          Peripheral Intravenous Line  Duration           Peripheral IV 08/24/22 Right Antecubital 3 days    Peripheral IV 08/27/22 Distal;Left;Ventral (anterior) Forearm <1 day          Airway  Duration           Surgical Airway Shiley Fenestrated 179 days                Central Line:  Goal for removal: Will discontinue when meds requiring line are completed  Telemetry:  Telemetry Orders (From admission, onward)             48 Hour Telemetry Monitoring  Continuous x 48 hours        References:    Telemetry Guidelines   Question:  Reason for 48 Hour Telemetry  Answer:  Acute Decompensated CHF (continuous diuretic infusion or total diuretic dose > 200 mg daily, associated electrolyte derangement, ionotropic drip, history of ventricular arrhythmia, or new EF <35%)                 Telemetry Reviewed: Normal Sinus Rhythm  Indication for Continued Telemetry Use: No indication for continued use  Will discontinue  Imaging: No pertinent imaging reviewed  Recent Cultures (last 7 days):   Results from last 7 days   Lab Units 08/24/22  0951   BLOOD CULTURE  No Growth at 72 hrs  No Growth at 72 hrs         Last 24 Hours Medication List:   Current Facility-Administered Medications   Medication Dose Route Frequency Provider Last Rate    acetaminophen  975 mg Oral Q8H [address] Treasure Ackerman MD      albuterol  2 puff Inhalation Q4H PRN Simin Portillo MD      amiodarone  100 mg Oral Daily With Breakfast Treasure Ackerman MD      atorvastatin  40 mg Oral Daily With Donna Resendiz MD      benzonatate  100 mg Oral TID PRN Simin Portillo MD      dexamethasone  6 mg Intravenous Q24H Anna Bruner MD      escitalopram  10 mg Oral Daily Treasure Ackerman MD      ferrous sulfate  325 mg Oral Daily With Breakfast Treasure Johnson Anastasiia Epperson MD      furosemide  40 mg Oral BID (diuretic) Alize Anderson MD      heparin (porcine)  3-20 Units/kg/hr (Order-Specific) Intravenous Titrated Heather Bhatti MD 18 Units/kg/hr (08/27/22 5061)    heparin (porcine)  2,000 Units Intravenous Q1H PRN Heather Bhatti MD      heparin (porcine)  4,000 Units Intravenous Q1H PRN Heather Bhatti MD      hydrOXYzine HCL  25 mg Oral Q6H PRN Philippe Dunbar MD      insulin glargine  30 Units Subcutaneous HS Gabbi Gonzalez MD      insulin lispro  1-5 Units Subcutaneous HS Bolanle Darylene Fenton, MD      insulin lispro  1-6 Units Subcutaneous TID AC Bolanle Darylene Fenton, MD      insulin lispro  12 Units Subcutaneous TID With Meals Miguel A Payton MD      ipratropium  0 5 mg Nebulization TID Yanet Sierra MD      levalbuterol  0 63 mg Nebulization TID PRN Venessa Evans MD      levalbuterol  1 25 mg Nebulization TID Yanet Sierra MD      lidocaine  2 patch Topical Daily Bolanle Darylene Fenton, MD      LORazepam  0 5 mg Oral Q8H PRN Philippe Dunbar MD      metoprolol succinate  50 mg Oral Q12H Bolanle Darylene Fenton, MD      mirtazapine  7 5 mg Oral HS Bolanle Darylene Fenton, MD      pantoprazole  40 mg Oral Early Morning Bolanle Darylene Fenton, MD      polyethylene glycol  17 g Oral Daily Bolanle Darylene Fenton, MD      pregabalin  75 mg Oral Daily Bolanle Darylene Fenton, MD      remdesivir  100 mg Intravenous Q24H Milvia Harris MD      sodium chloride  3 mL Nebulization TID PRN Venessa Evans MD      ticagrelor  90 mg Oral Q12H [facility] Philippe Dunbar MD          Today, Patient Was Seen By: Alize Anderson MD    **Please Note: This note may have been constructed using a voice recognition system  **

## 2022-08-28 NOTE — ASSESSMENT & PLAN NOTE
· POA, initially tested positive for COVID on 5/19/22 and again today 8/24/22  · Doubt current symptoms related to COVID in view of sudden onset with almost immediate resolution  · Complained of thick sputum whitish to yellowish in color, NO fever or SOB, On 10 l oxygen, saturation 98%    Plan  · Will monitor per mild COVID pathway  · Contionue Remdesivir and Dexamethasone  · Atrovent/Xopenex t i d    · respi  protocol

## 2022-08-28 NOTE — ASSESSMENT & PLAN NOTE
Lab Results   Component Value Date    EGFR 52 08/27/2022    EGFR 57 08/26/2022    EGFR 51 08/25/2022    CREATININE 1 16 08/27/2022    CREATININE 1 08 08/26/2022    CREATININE 1 18 08/25/2022     · Baseline creatinine around 0 9-1 0 based on review of most recent admission  · Creatinine elevated at 1 34 on admission but back to baseline today  · Will continue to monitor closely while on diuretics

## 2022-08-28 NOTE — PLAN OF CARE
Problem: MOBILITY - ADULT  Goal: Maintain or return to baseline ADL function  Description: INTERVENTIONS:  -  Assess patient's ability to carry out ADLs; assess patient's baseline for ADL function and identify physical deficits which impact ability to perform ADLs (bathing, care of mouth/teeth, toileting, grooming, dressing, etc )  - Assess/evaluate cause of self-care deficits   - Assess range of motion  - Assess patient's mobility; develop plan if impaired  - Assess patient's need for assistive devices and provide as appropriate  - Encourage maximum independence but intervene and supervise when necessary  - Involve family in performance of ADLs  - Assess for home care needs following discharge   - Consider OT consult to assist with ADL evaluation and planning for discharge  - Provide patient education as appropriate  Outcome: Progressing  Goal: Maintains/Returns to pre admission functional level  Description: INTERVENTIONS:  - Perform BMAT or MOVE assessment daily    - Set and communicate daily mobility goal to care team and patient/family/caregiver     - Collaborate with rehabilitation services on mobility goals if consulted  - Out of bed for toileting  - Record patient progress and toleration of activity level   Outcome: Progressing     Problem: Potential for Falls  Goal: Patient will remain free of falls  Description: INTERVENTIONS:  - Educate patient/family on patient safety including physical limitations  - Instruct patient to call for assistance with activity   - Consult OT/PT to assist with strengthening/mobility   - Keep Call bell within reach  - Keep bed low and locked with side rails adjusted as appropriate  - Keep care items and personal belongings within reach  - Initiate and maintain comfort rounds  - Make Fall Risk Sign visible to staff  - Apply yellow socks and bracelet for high fall risk patients  - Consider moving patient to room near nurses station  Outcome: Progressing     Problem: Prexisting or High Potential for Compromised Skin Integrity  Goal: Skin integrity is maintained or improved  Description: INTERVENTIONS:  - Identify patients at risk for skin breakdown  - Assess and monitor skin integrity  - Assess and monitor nutrition and hydration status  - Monitor labs   - Assess for incontinence   - Turn and reposition patient  - Assist with mobility/ambulation  - Relieve pressure over bony prominences  - Avoid friction and shearing  - Provide appropriate hygiene as needed including keeping skin clean and dry  - Evaluate need for skin moisturizer/barrier cream  - Collaborate with interdisciplinary team   - Patient/family teaching  - Consider wound care consult   Outcome: Progressing     Problem: RESPIRATORY - ADULT  Goal: Achieves optimal ventilation and oxygenation  Description: INTERVENTIONS:  - Assess for changes in respiratory status  - Assess for changes in mentation and behavior  - Position to facilitate oxygenation and minimize respiratory effort  - Oxygen administered by appropriate delivery if ordered  - Initiate smoking cessation education as indicated  - Encourage broncho-pulmonary hygiene including cough, deep breathe, Incentive Spirometry  - Assess the need for suctioning and aspirate as needed  - Assess and instruct to report SOB or any respiratory difficulty  - Respiratory Therapy support as indicated  Outcome: Progressing     Problem: Nutrition/Hydration-ADULT  Goal: Nutrient/Hydration intake appropriate for improving, restoring or maintaining nutritional needs  Description: Monitor and assess patient's nutrition/hydration status for malnutrition  Collaborate with interdisciplinary team and initiate plan and interventions as ordered  Monitor patient's weight and dietary intake as ordered or per policy  Utilize nutrition screening tool and intervene as necessary  Determine patient's food preferences and provide high-protein, high-caloric foods as appropriate       INTERVENTIONS:  - Monitor oral intake, urinary output, labs, and treatment plans  - Assess nutrition and hydration status and recommend course of action  - Evaluate amount of meals eaten  - Assist patient with eating if necessary   - Allow adequate time for meals  - Recommend/ encourage appropriate diets, oral nutritional supplements, and vitamin/mineral supplements  - Order, calculate, and assess calorie counts as needed  - Recommend, monitor, and adjust tube feedings and TPN/PPN based on assessed needs  - Assess need for intravenous fluids  - Provide specific nutrition/hydration education as appropriate  - Include patient/family/caregiver in decisions related to nutrition  Outcome: Progressing

## 2022-08-28 NOTE — ASSESSMENT & PLAN NOTE
Lab Results   Component Value Date    HGBA1C 8 1 (H) 08/24/2022       Recent Labs     08/27/22  0814 08/27/22  1123 08/27/22  1630 08/27/22 2053   POCGLU 195* 234* 311* 279*     Lantus increased to 30 Units  Premeal insulin increased to 10 U TID        Blood Sugar Average: Last 72 hrs:  · (P) 464 [id number]   · Has been uncontrolled based on most recent hemoglobin A1c results  · She will be continued on basal insulin per most recent discharge doses  · Accu-Cheks a c  HS with correctional scale insulin

## 2022-08-28 NOTE — ASSESSMENT & PLAN NOTE
Venous doppler  evidence of focal chronic vs  subacute non-occlusive deep vein  thrombosis noted in the soleal veins at the mid calf  D dimer:6 2  Patient was compliant with Eliquis    Plan  Hematology consulted

## 2022-08-29 ENCOUNTER — TELEPHONE (OUTPATIENT)
Dept: HEMATOLOGY ONCOLOGY | Facility: CLINIC | Age: 57
End: 2022-08-29

## 2022-08-29 LAB
ALBUMIN SERPL BCP-MCNC: 2.6 G/DL (ref 3.5–5)
ALP SERPL-CCNC: 93 U/L (ref 34–104)
ALT SERPL W P-5'-P-CCNC: 9 U/L (ref 7–52)
ANION GAP SERPL CALCULATED.3IONS-SCNC: 7 MMOL/L (ref 4–13)
APTT PPP: 72 SECONDS (ref 23–37)
AST SERPL W P-5'-P-CCNC: 9 U/L (ref 13–39)
BACTERIA BLD CULT: NORMAL
BACTERIA BLD CULT: NORMAL
BASOPHILS # BLD AUTO: 0.03 THOUSANDS/ΜL (ref 0–0.1)
BASOPHILS NFR BLD AUTO: 0 % (ref 0–1)
BILIRUB SERPL-MCNC: 0.32 MG/DL (ref 0.2–1)
BUN SERPL-MCNC: 33 MG/DL (ref 5–25)
CALCIUM ALBUM COR SERPL-MCNC: 8.9 MG/DL (ref 8.3–10.1)
CALCIUM SERPL-MCNC: 7.8 MG/DL (ref 8.4–10.2)
CHLORIDE SERPL-SCNC: 108 MMOL/L (ref 96–108)
CO2 SERPL-SCNC: 26 MMOL/L (ref 21–32)
CREAT SERPL-MCNC: 1.06 MG/DL (ref 0.6–1.3)
EOSINOPHIL # BLD AUTO: 0.02 THOUSAND/ΜL (ref 0–0.61)
EOSINOPHIL NFR BLD AUTO: 0 % (ref 0–6)
ERYTHROCYTE [DISTWIDTH] IN BLOOD BY AUTOMATED COUNT: 19.1 % (ref 11.6–15.1)
FUNGUS SPEC CULT: NORMAL
FUNGUS SPEC CULT: NORMAL
GFR SERPL CREATININE-BSD FRML MDRD: 58 ML/MIN/1.73SQ M
GLUCOSE SERPL-MCNC: 187 MG/DL (ref 65–140)
GLUCOSE SERPL-MCNC: 201 MG/DL (ref 65–140)
GLUCOSE SERPL-MCNC: 216 MG/DL (ref 65–140)
GLUCOSE SERPL-MCNC: 224 MG/DL (ref 65–140)
GLUCOSE SERPL-MCNC: 332 MG/DL (ref 65–140)
HCT VFR BLD AUTO: 25.6 % (ref 34.8–46.1)
HGB BLD-MCNC: 7.7 G/DL (ref 11.5–15.4)
IMM GRANULOCYTES # BLD AUTO: 0.3 THOUSAND/UL (ref 0–0.2)
IMM GRANULOCYTES NFR BLD AUTO: 2 % (ref 0–2)
LYMPHOCYTES # BLD AUTO: 1.49 THOUSANDS/ΜL (ref 0.6–4.47)
LYMPHOCYTES NFR BLD AUTO: 12 % (ref 14–44)
MCH RBC QN AUTO: 25.3 PG (ref 26.8–34.3)
MCHC RBC AUTO-ENTMCNC: 30.1 G/DL (ref 31.4–37.4)
MCV RBC AUTO: 84 FL (ref 82–98)
MONOCYTES # BLD AUTO: 1.04 THOUSAND/ΜL (ref 0.17–1.22)
MONOCYTES NFR BLD AUTO: 8 % (ref 4–12)
NEUTROPHILS # BLD AUTO: 9.6 THOUSANDS/ΜL (ref 1.85–7.62)
NEUTS SEG NFR BLD AUTO: 78 % (ref 43–75)
NRBC BLD AUTO-RTO: 0 /100 WBCS
PLATELET # BLD AUTO: 400 THOUSANDS/UL (ref 149–390)
PMV BLD AUTO: 10.2 FL (ref 8.9–12.7)
POTASSIUM SERPL-SCNC: 3.8 MMOL/L (ref 3.5–5.3)
PROT SERPL-MCNC: 6.4 G/DL (ref 6.4–8.4)
RBC # BLD AUTO: 3.04 MILLION/UL (ref 3.81–5.12)
SODIUM SERPL-SCNC: 141 MMOL/L (ref 135–147)
WBC # BLD AUTO: 12.48 THOUSAND/UL (ref 4.31–10.16)

## 2022-08-29 PROCEDURE — 94760 N-INVAS EAR/PLS OXIMETRY 1: CPT

## 2022-08-29 PROCEDURE — 85730 THROMBOPLASTIN TIME PARTIAL: CPT | Performed by: INTERNAL MEDICINE

## 2022-08-29 PROCEDURE — 80053 COMPREHEN METABOLIC PANEL: CPT | Performed by: INTERNAL MEDICINE

## 2022-08-29 PROCEDURE — 99232 SBSQ HOSP IP/OBS MODERATE 35: CPT | Performed by: INTERNAL MEDICINE

## 2022-08-29 PROCEDURE — 94640 AIRWAY INHALATION TREATMENT: CPT

## 2022-08-29 PROCEDURE — 97163 PT EVAL HIGH COMPLEX 45 MIN: CPT

## 2022-08-29 PROCEDURE — 85025 COMPLETE CBC W/AUTO DIFF WBC: CPT | Performed by: INTERNAL MEDICINE

## 2022-08-29 PROCEDURE — 82948 REAGENT STRIP/BLOOD GLUCOSE: CPT

## 2022-08-29 RX ORDER — AMLODIPINE BESYLATE 5 MG/1
5 TABLET ORAL DAILY
Status: DISCONTINUED | OUTPATIENT
Start: 2022-08-29 | End: 2022-08-30 | Stop reason: HOSPADM

## 2022-08-29 RX ORDER — INSULIN LISPRO 100 [IU]/ML
15 INJECTION, SOLUTION INTRAVENOUS; SUBCUTANEOUS
Status: DISCONTINUED | OUTPATIENT
Start: 2022-08-29 | End: 2022-08-30

## 2022-08-29 RX ORDER — DABIGATRAN ETEXILATE 150 MG/1
150 CAPSULE ORAL EVERY 12 HOURS SCHEDULED
Status: DISCONTINUED | OUTPATIENT
Start: 2022-08-29 | End: 2022-08-30 | Stop reason: HOSPADM

## 2022-08-29 RX ADMIN — METOPROLOL SUCCINATE 50 MG: 50 TABLET, EXTENDED RELEASE ORAL at 18:40

## 2022-08-29 RX ADMIN — HEPARIN SODIUM 18 UNITS/KG/HR: 10000 INJECTION, SOLUTION INTRAVENOUS at 13:55

## 2022-08-29 RX ADMIN — TICAGRELOR 90 MG: 90 TABLET ORAL at 21:50

## 2022-08-29 RX ADMIN — PREGABALIN 75 MG: 75 CAPSULE ORAL at 08:40

## 2022-08-29 RX ADMIN — INSULIN LISPRO 3 UNITS: 100 INJECTION, SOLUTION INTRAVENOUS; SUBCUTANEOUS at 21:47

## 2022-08-29 RX ADMIN — FUROSEMIDE 40 MG: 40 TABLET ORAL at 08:40

## 2022-08-29 RX ADMIN — REMDESIVIR 100 MG: 100 INJECTION, POWDER, LYOPHILIZED, FOR SOLUTION INTRAVENOUS at 11:05

## 2022-08-29 RX ADMIN — ATORVASTATIN CALCIUM 40 MG: 40 TABLET, FILM COATED ORAL at 18:40

## 2022-08-29 RX ADMIN — INSULIN LISPRO 15 UNITS: 100 INJECTION, SOLUTION INTRAVENOUS; SUBCUTANEOUS at 12:03

## 2022-08-29 RX ADMIN — IPRATROPIUM BROMIDE 0.5 MG: 0.5 SOLUTION RESPIRATORY (INHALATION) at 08:53

## 2022-08-29 RX ADMIN — FUROSEMIDE 40 MG: 40 TABLET ORAL at 18:40

## 2022-08-29 RX ADMIN — INSULIN LISPRO 2 UNITS: 100 INJECTION, SOLUTION INTRAVENOUS; SUBCUTANEOUS at 08:41

## 2022-08-29 RX ADMIN — TICAGRELOR 90 MG: 90 TABLET ORAL at 08:40

## 2022-08-29 RX ADMIN — INSULIN LISPRO 2 UNITS: 100 INJECTION, SOLUTION INTRAVENOUS; SUBCUTANEOUS at 18:43

## 2022-08-29 RX ADMIN — IPRATROPIUM BROMIDE 0.5 MG: 0.5 SOLUTION RESPIRATORY (INHALATION) at 14:10

## 2022-08-29 RX ADMIN — LIDOCAINE 5% 2 PATCH: 700 PATCH TOPICAL at 08:41

## 2022-08-29 RX ADMIN — LEVALBUTEROL HYDROCHLORIDE 1.25 MG: 1.25 SOLUTION, CONCENTRATE RESPIRATORY (INHALATION) at 08:53

## 2022-08-29 RX ADMIN — LEVALBUTEROL HYDROCHLORIDE 1.25 MG: 1.25 SOLUTION, CONCENTRATE RESPIRATORY (INHALATION) at 19:27

## 2022-08-29 RX ADMIN — LEVALBUTEROL HYDROCHLORIDE 1.25 MG: 1.25 SOLUTION, CONCENTRATE RESPIRATORY (INHALATION) at 14:10

## 2022-08-29 RX ADMIN — INSULIN LISPRO 1 UNITS: 100 INJECTION, SOLUTION INTRAVENOUS; SUBCUTANEOUS at 12:03

## 2022-08-29 RX ADMIN — DABIGATRAN ETEXILATE MESYLATE 150 MG: 150 CAPSULE ORAL at 21:46

## 2022-08-29 RX ADMIN — INSULIN LISPRO 15 UNITS: 100 INJECTION, SOLUTION INTRAVENOUS; SUBCUTANEOUS at 18:43

## 2022-08-29 RX ADMIN — PANTOPRAZOLE SODIUM 40 MG: 40 TABLET, DELAYED RELEASE ORAL at 04:31

## 2022-08-29 RX ADMIN — AMIODARONE HYDROCHLORIDE 100 MG: 200 TABLET ORAL at 08:51

## 2022-08-29 RX ADMIN — METOPROLOL SUCCINATE 50 MG: 50 TABLET, EXTENDED RELEASE ORAL at 04:31

## 2022-08-29 RX ADMIN — IPRATROPIUM BROMIDE 0.5 MG: 0.5 SOLUTION RESPIRATORY (INHALATION) at 19:27

## 2022-08-29 RX ADMIN — INSULIN GLARGINE 30 UNITS: 100 INJECTION, SOLUTION SUBCUTANEOUS at 21:47

## 2022-08-29 RX ADMIN — MIRTAZAPINE 7.5 MG: 15 TABLET, FILM COATED ORAL at 21:46

## 2022-08-29 RX ADMIN — ESCITALOPRAM OXALATE 10 MG: 10 TABLET ORAL at 08:40

## 2022-08-29 RX ADMIN — DEXAMETHASONE SODIUM PHOSPHATE 6 MG: 4 INJECTION INTRA-ARTICULAR; INTRALESIONAL; INTRAMUSCULAR; INTRAVENOUS; SOFT TISSUE at 11:05

## 2022-08-29 RX ADMIN — AMLODIPINE BESYLATE 5 MG: 5 TABLET ORAL at 12:03

## 2022-08-29 NOTE — ASSESSMENT & PLAN NOTE
Lab Results   Component Value Date    EGFR 58 08/29/2022    EGFR 66 08/28/2022    EGFR 52 08/27/2022    CREATININE 1 06 08/29/2022    CREATININE 0 96 08/28/2022    CREATININE 1 16 08/27/2022     Baseline creatinine between 0 9-1 0 based on chart review  Creatinine was elevated on admission but now back down to baseline      Plan:  Continue to monitor creatinine levels and electrolytes in setting of diuretic therapy

## 2022-08-29 NOTE — ASSESSMENT & PLAN NOTE
Lab Results   Component Value Date    HGBA1C 8 1 (H) 08/24/2022       Recent Labs     08/28/22  1607 08/28/22  2040 08/29/22  0723 08/29/22  1057   POCGLU 246* 252* 201* 224*       Blood Sugar Average: Last 72 hrs:  (P) 256 5     Hemoglobin A1c of 8 1% demonstrating uncontrolled diabetes  Plan:  Currently taking Lantus 30 units    Pre meal insulin 10 units t i d   Sliding scale insulin  Hypoglycemic protocol  Accu-Cheks and correctional scale insulin

## 2022-08-29 NOTE — QUICK NOTE
Quick Note:  - Spoke for primary team- recommended pradaxa or coumadin as reasonable anticoagulation; would avoid DOAC given eliquis failure  - Pt should be seen by hematology in next 1-2 months to review case and discuss anticoagulation recommendations moving forward  I did review this patient with Dr Svetlana Bonilla  and she is in agreement with this plan        KENIA Gaitan-BC  Hematology/Oncology Nurse Practitioner

## 2022-08-29 NOTE — ASSESSMENT & PLAN NOTE
Patient presented from rehab with acute onset respiratory distress and hypoxia  Symptoms are resolving and patient reports feeling back at baseline  Patient was recently discharged from hospital to rehab on 2022 following admission for right-sided empyema with initial seal chest tube management subsequently requiring VATS decortication on 2022  Tracheostomy in place from prior admissions  No increase in secretions  On 8 L of oxygen today through supplemental trach collar  CT scan of the chest demonstrating bilateral consolidations which may represent infection  Family reports that patient requires 4-6 L of oxygen therapy at home  Per Care Management patient was originally set up with oxygen supplementation back in February with 10 L via trach collar but script   Plan:  Continue oxygen therapy through tracheostomy collar  Currently 8 L and 95% on room air  Need to reach out to pulmonology to determine if patient requires a higher level of O2 supplementation for home therapy    Continue with remdesivir and dexamethasone  Continue with ipratropium and levalbuterol  Pulmonary on board, follow recommendations  Follow-up with oxygen requirements at [facility] in [address] before leaving on 2022   per pulmonology; currently below regular O2 requirements, no need for antibiotics secondary to clinical improvement, continue with heparin and Decadron until discharge, completed remdesivir today, continue with nebulizers following discharge

## 2022-08-29 NOTE — ASSESSMENT & PLAN NOTE
History of subglottic stenosis    Plan:  Continue his routine trach care  Outpatient follow-up with pulmonology/ENT

## 2022-08-29 NOTE — ASSESSMENT & PLAN NOTE
History of hypertension  Elevated blood pressures today 177/93, 121 map  Elevated pressures most likely related to steroids      Plan:  Start on amlodipine 5 mg daily hold if systolic blood pressures less than 110

## 2022-08-29 NOTE — PLAN OF CARE
Problem: PHYSICAL THERAPY ADULT  Goal: Performs mobility at highest level of function for planned discharge setting  See evaluation for individualized goals  Description: Treatment/Interventions: Functional transfer training, LE strengthening/ROM, Elevations, Therapeutic exercise, Endurance training, Patient/family training, Equipment eval/education, Bed mobility, Gait training  Equipment Recommended: Rodgers Cranker       See flowsheet documentation for full assessment, interventions and recommendations  Note: Prognosis: Fair  Problem List: Decreased strength, Decreased endurance, Impaired balance, Decreased mobility, Obesity  Assessment: Pt is a 65 y o  female seen for PT evaluation s/p admit to [address] [place] [address] on 8/18/2022 w/ UTI (urinary tract infection)  Order placed for PT  Comorbidities affecting pt's physical performance at time of assessment include: obesity, neuropathy and vertigo  Personal factors affecting pt at time of IE include: anxiety, depression and limited home support  Prior to admission, pt was was independent w/ all functional mobility w/ out AD and was admitted from rehab  Upon evaluation: Pt requires supervision for bed mobility, min A for sit to stand, and min A for short distance ambulation with RW  (Please find full objective findings from PT assessment regarding body systems outlined above)  Impairments and limitations also listed above, especially due to  weakness, impaired balance, decreased endurance, gait deviations, decreased activity tolerance, fall risk and SOB upon exertion  Pt's clinical presentation is currently unstable/unpredictable seen in pt's presentation of fall risk, decline in functional mobility compared to baseline, and SOB with mobility  Pt to benefit from continued skilled PT tx while in hospital and upon DC to address deficits as defined above and maximize level of functional mobility   From PT/mobility standpoint, recommendation at time of d/c would be to return to rehab pending progress/potential stair negotiation in order to maximize pt's functional independence and consistency w/ mobility in order to facilitate return to PLOF  Recommend progression of ambulation, stair negotiation, and initiation of HEP as appropriate  PT Discharge Recommendation: Post acute rehabilitation services (return to rehab)    See flowsheet documentation for full assessment

## 2022-08-29 NOTE — ASSESSMENT & PLAN NOTE
Wt Readings from Last 3 Encounters:   08/29/22 95 kg (209 lb 7 oz)   08/23/22 80 8 kg (178 lb 3 2 oz)   08/19/22 80 8 kg (178 lb 3 2 oz)       Recent admission to [facility] in July for CHF exacerbation  Patient was on Bumex 2 mg daily and Aldactone 100 mg daily  Following discharge following VATS decortication, Bumex was discontinued on August 13 and Aldactone was decreased to 50 mg daily  Patient was not placed on any diuretics following discharge from hospitalization  Chest x-ray for this admission demonstrates moderate pulmonary vascular congestion and small right pleural effusion  Most recent echocardiogram was in February of 2022 which showed EF of 50% with normal diastolic function  Recent BNP this admission of 8400    Plan:  Cardiology on board, following recommendations  Furosemide 40 mg b i d    Continue to monitor patient for signs of worsening heart failure  Monitor weights, ins and outs, serum creatinine electrolytes

## 2022-08-29 NOTE — NURSING NOTE
VAS team rounds, DL right picc line is showing in the computer, no picc line present at this time, unsure when picc was removed

## 2022-08-29 NOTE — QUICK NOTE
[facility] Hospitalist [facility] Attending Physician Attestation Note - Progress Note    I have seen and examined Kelli Red personally and have reviewed the medical record independently  I have reviewed the case with the resident physician including all assessments and the plan of care for each  I agree with the resident physician and offer the following addendum to the below statements by the resident physician:     Date Evaluated: August 29th 2022  Time Evaluated: morning   Subjective / HPI:     This is a 30-year-old female with chronic hypoxic respiratory failure  She states that she is on 10 L of oxygen at skilled nursing facility  Currently she is on 8 L  She is status post empyema and VATS decortication  Pulmonary are following and we are discussing with them that she is likely medically stable for discharge today from their standpoint  We just have to determine if she in fact was on 10 L at her facility and if the facility will take her back with current requirements  Overall the patient states she feels better  She was found to have a soleal DVT despite being on Eliquis and has been on heparin drip until today, we are now transitioning to Pradaxa after being recommended by heme Onc to start this given presumed Eliquis failure  Exam:   No respiratory distress  Trach in-situ  On 8 L  Bilateral lower extremity- no edema  Abdomen soft nondistended  All 4 extremities warm and well perfused  Alert oriented x3    Assessment and Plan:  · Acute on chronic hypoxic respiratory failure  · Possibly at baseline need to ascertain this confirm with nursing home  · If she is at or below baseline with regard to oxygen requirement she is now medically stable for discharge  · Transition off heparin drip to Pradaxa  · Arrange for transport back to facility    Patient Centered Rounds: I have performed bedside rounds with nursing staff today      Discussions with Specialists or Other Care Team Provider: Discussed with pulmonology as well as Hematology-Oncology    Education and Discussions with Family / Patient:  Discussed with patient resident updated family    Time Spent for Care: 30 minutes  More than 50% of total time spent on counseling and coordination of care as described above  Current Length of Stay: 5 day(s)    Current Patient Status: Inpatient   Certification Statement: The patient will continue to require additional inpatient hospital stay due to Determining baseline O2 status    Discharge Plan / Estimated Discharge Date:  Likely today or tomorrow    For detailed history, assessment, and plan of care, please review the statements below by the resident       Jonny Mabry MD

## 2022-08-29 NOTE — ASSESSMENT & PLAN NOTE
Tested positive for COVID on May 19, 2022  Test positive again for COVID on August 24, 2022  Patient complains of thick sputum whitish to yellowish in color, no fevers no shortness of breath, requiring oxygen therapy, saturating 95% on 8 L  Symptoms unlikely significantly related to COVID due to resolution of symptoms in a timely manner      Plan:  Continue with mild COVID pathway  Continue with remdesivir and dexamethasone  Continue with ipratropium and levalbuterol   Respiratory protocol

## 2022-08-29 NOTE — PROGRESS NOTES
[facility]  Progress Note - Lorry Every 1965, 65 y o  female MRN: [id number]  Unit/Bed#: S -01 Encounter: [id number]  Primary Care Provider: Meghan Singleton MD   Date and time admitted to hospital: 2022  9:34 AM    * Acute Respiratory insufficiency on chronic hypoxic respiratory failure  Assessment & Plan  Patient presented from rehab with acute onset respiratory distress and hypoxia  Symptoms are resolving and patient reports feeling back at baseline  Patient was recently discharged from hospital to rehab on 2022 following admission for right-sided empyema with initial seal chest tube management subsequently requiring VATS decortication on 2022  Tracheostomy in place from prior admissions  No increase in secretions  On 8 L of oxygen today through supplemental trach collar  CT scan of the chest demonstrating bilateral consolidations which may represent infection  Family reports that patient requires 4-6 L of oxygen therapy at home  Per Care Management patient was originally set up with oxygen supplementation back in February with 10 L via trach collar but script   Plan:  Continue oxygen therapy through tracheostomy collar  Currently 8 L and 95% on room air  Need to reach out to pulmonology to determine if patient requires a higher level of O2 supplementation for home therapy    Continue with remdesivir and dexamethasone  Continue with ipratropium and levalbuterol  Pulmonary on board, follow recommendations  Follow-up with oxygen requirements at [facility] before leaving on 2022   per pulmonology; currently below regular O2 requirements, no need for antibiotics secondary to clinical improvement, continue with heparin and Decadron until discharge, completed remdesivir today, continue with nebulizers following discharge    Chronic heart failure with preserved ejection fraction (HCC)  Assessment & Plan  Wt Readings from Last 3 Encounters:   08/29/22 95 kg (209 lb 7 oz)   08/23/22 80 8 kg (178 lb 3 2 oz)   08/19/22 80 8 kg (178 lb 3 2 oz)       Recent admission to [facility] in July for CHF exacerbation  Patient was on Bumex 2 mg daily and Aldactone 100 mg daily  Following discharge following VATS decortication, Bumex was discontinued on August 13 and Aldactone was decreased to 50 mg daily  Patient was not placed on any diuretics following discharge from hospitalization  Chest x-ray for this admission demonstrates moderate pulmonary vascular congestion and small right pleural effusion  Most recent echocardiogram was in February of 2022 which showed EF of 50% with normal diastolic function  Recent BNP this admission of 8400    Plan:  Cardiology on board, following recommendations  Furosemide 40 mg b i d  Continue to monitor patient for signs of worsening heart failure  Monitor weights, ins and outs, serum creatinine electrolytes      COVID-19  Assessment & Plan  Tested positive for COVID on May 19, 2022  Test positive again for COVID on August 24, 2022  Patient complains of thick sputum whitish to yellowish in color, no fevers no shortness of breath, requiring oxygen therapy, saturating 95% on 8 L  Symptoms unlikely significantly related to COVID due to resolution of symptoms in a timely manner  Plan:  Continue with mild COVID pathway  Continue with remdesivir and dexamethasone  Continue with ipratropium and levalbuterol   Respiratory protocol    DVT (deep venous thrombosis) (HCC)  Assessment & Plan  Was found to have DVT of left soleus vein during this admission, elevated D-dimer to 6 2  DVT is chronic versus subacute nonocclusive  This was in the setting of Eliquis use due to atrial fibrillation  Suspected Eliquis failure  Plan:  Hematology consulted  On recommendations for 2 anticoagulation therapy in the setting of Eliquis failure    Hematology recommending Pradaxa, if this medication is too expensive patient can be started on Coumadin  switched  Patient to [address]   outpatient hematology evaluation in 1-2 months    Chronic kidney disease  Assessment & Plan  Lab Results   Component Value Date    EGFR 58 08/29/2022    EGFR 66 08/28/2022    EGFR 52 08/27/2022    CREATININE 1 06 08/29/2022    CREATININE 0 96 08/28/2022    CREATININE 1 16 08/27/2022     Baseline creatinine between 0 9-1 0 based on chart review  Creatinine was elevated on admission but now back down to baseline  Plan:  Continue to monitor creatinine levels and electrolytes in setting of diuretic therapy      Tracheostomy in place [facility])  Assessment & Plan  History of subglottic stenosis    Plan:  Continue his routine trach care  Outpatient follow-up with pulmonology/ENT    History of Cardiac arrest [facility])  Assessment & Plan  Plan:  Continue with home medications  Monitor on telemetry    Type 2 diabetes mellitus with hyperglycemia [facility])  Assessment & Plan  Lab Results   Component Value Date    HGBA1C 8 1 (H) 08/24/2022       Recent Labs     08/28/22  1607 08/28/22  2040 08/29/22  0723 08/29/22  1057   POCGLU 246* 252* 201* 224*       Blood Sugar Average: Last 72 hrs:  (P) 256 5     Hemoglobin A1c of 8 1% demonstrating uncontrolled diabetes  Plan:  Currently taking Lantus 30 units  Pre meal insulin 10 units t i d   Sliding scale insulin  Hypoglycemic protocol  Accu-Cheks and correctional scale insulin      Hypertension  Assessment & Plan  History of hypertension  Elevated blood pressures today 177/93, 121 map  Elevated pressures most likely related to steroids  Plan:  Start on amlodipine 5 mg daily hold if systolic blood pressures less than 110        VTE Pharmacologic Prophylaxis: VTE Score: 3 Moderate Risk (Score 3-4) - Pharmacological DVT Prophylaxis Ordered: heparin drip  Patient Centered Rounds: I performed bedside rounds with nursing staff today    Discussions with Specialists or Other Care Team Provider: Pulmonology and hematology    Education and Discussions with Family / Patient: did not attempt to update family today  Current Length of Stay: 5 day(s)  Current Patient Status: Inpatient   Discharge Plan: Anticipate discharge in >72 hrs to prior assisted or independent living facility  Code Status: Level 1 - Full Code    Subjective:   Tucker Dumont is a 72-year-old female with past medical history of Afib on Eliquis, subglottic stenosis with tracheostomy, chronic heart failure with preserved ejection fraction, chronic kidney disease, history of cardiac arrest, hypertension, type 2 diabetes, back pain, chronic opioid use who is being managed in the hospital for acute on chronic hypoxic respiratory failure, COVID, DVT of the left soleus vein, heart failure, and tracheostomy  DVT of the left soleus vein was found despite on chronic Eliquis therapy  Patient is status post empyema and VATS decortication on 2022  She completed rehab on 2022  No overnight events  Patient has tracheostomy which complicated history taking  Patient reports feeling better than days prior and her breathing is improved  She denies fever, chills, nausea, vomiting, chest pain  She reports that the right side of her chest where the previous decortication was done is a sore  They removed sutures yesterday  She also reports lower back pain which is chronic in nature  Her last bowel movement was 3 days ago  She reports no problems with urination  Patient is comfortable today  Objective:     Vitals:   Temp (24hrs), Av 1 °F (36 7 °C), Min:97 8 °F (36 6 °C), Max:98 3 °F (36 8 °C)    Temp:  [97 8 °F (36 6 °C)-98 3 °F (36 8 °C)] 98 3 °F (36 8 °C)  HR:  [54-67] 60  Resp:  [17-18] 17  BP: (153-181)/(83-93) 153/83  SpO2:  [94 %-99 %] 94 %  Body mass index is 30 93 kg/m²  Input and Output Summary (last 24 hours):      Intake/Output Summary (Last 24 hours) at 2022 1712  Last data filed at 2022 1201  Gross per 24 hour   Intake 0 ml   Output 1350 ml   Net -1350 ml       Physical Exam:   Physical Exam  Vitals and nursing note reviewed  Constitutional:       General: She is not in acute distress  Appearance: She is well-developed  HENT:      Head: Normocephalic and atraumatic  Eyes:      Conjunctiva/sclera: Conjunctivae normal    Cardiovascular:      Rate and Rhythm: Normal rate and regular rhythm  Heart sounds: No murmur heard  Pulmonary:      Effort: Pulmonary effort is normal  No respiratory distress  Breath sounds: Normal breath sounds  Comments: Bilateral crackles right greater than left  Reduced lung sounds bilaterally, more pronounced on the right side  Chest:      Chest wall: Tenderness (tenderness along incision site on L  side of chest, no erythema, or exudate) present  Abdominal:      General: Abdomen is flat  Bowel sounds are normal       Palpations: Abdomen is soft  Tenderness: There is no abdominal tenderness  Musculoskeletal:      Cervical back: Neck supple  Right lower leg: No edema  Left lower leg: No edema  Skin:     General: Skin is warm and dry  Neurological:      Mental Status: She is alert and oriented to person, place, and time            Additional Data:     Labs:  Results from last 7 days   Lab Units 08/29/22  0429   WBC Thousand/uL 12 48*   HEMOGLOBIN g/dL 7 7*   HEMATOCRIT % 25 6*   PLATELETS Thousands/uL 400*   NEUTROS PCT % 78*   LYMPHS PCT % 12*   MONOS PCT % 8   EOS PCT % 0     Results from last 7 days   Lab Units 08/29/22  0429   SODIUM mmol/L 141   POTASSIUM mmol/L 3 8   CHLORIDE mmol/L 108   CO2 mmol/L 26   BUN mg/dL 33*   CREATININE mg/dL 1 06   ANION GAP mmol/L 7   CALCIUM mg/dL 7 8*   ALBUMIN g/dL 2 6*   TOTAL BILIRUBIN mg/dL 0 32   ALK PHOS U/L 93   ALT U/L 9   AST U/L 9*   GLUCOSE RANDOM mg/dL 187*     Results from last 7 days   Lab Units 08/26/22  1111   INR  1 34*     Results from last 7 days   Lab Units 08/29/22  1544 08/29/22  1057 08/29/22  0723 08/28/22  2040 08/28/22  1607 08/28/22  1136 08/28/22  0824 08/27/22  2053 08/27/22  1630 08/27/22  1123 08/27/22  0814 08/26/22 2052   POC GLUCOSE mg/dl 216* 224* 201* 252* 246* 172* 164* 279* 311* 234* 195* 400*     Results from last 7 days   Lab Units 08/24/22  0951   HEMOGLOBIN A1C % 8 1*     Results from last 7 days   Lab Units 08/26/22  0451 08/25/22  1054 08/24/22  0951   LACTIC ACID mmol/L  --   --  1 8   PROCALCITONIN ng/ml 0 77* 1 00* 0 11       Lines/Drains:  Invasive Devices  Report    Peripheral Intravenous Line  Duration           Peripheral IV 08/27/22 Distal;Left;Ventral (anterior) Forearm 2 days          Airway  Duration           Surgical Airway Shiley Fenestrated 180 days                Central Line:  Goal for removal: Chronic line will stay in place             Imaging: No pertinent imaging reviewed  Recent Cultures (last 7 days):   Results from last 7 days   Lab Units 08/24/22  0951   BLOOD CULTURE  No Growth After 5 Days  No Growth After 5 Days         Last 24 Hours Medication List:   Current Facility-Administered Medications   Medication Dose Route Frequency Provider Last Rate    acetaminophen  975 mg Oral Q8H [address] Treasure Shirley MD      albuterol  2 puff Inhalation Q4H PRN Sage Peres MD      amiodarone  100 mg Oral Daily With Breakfast Treasure Shirley MD      amLODIPine  5 mg Oral Daily Gurvinder Aguilar MD      atorvastatin  40 mg Oral Daily With Magalis Salazar MD      benzonatate  100 mg Oral TID PRN Sage Peres MD      dabigatran etexilate  150 mg Oral Q12H [address] Belinda Shirley MD      dexamethasone  6 mg Intravenous Q24H Lydia Dubon MD      escitalopram  10 mg Oral Daily Treasure Shirley MD      ferrous sulfate  325 mg Oral Daily With Breakfast Treasure Shirley MD      furosemide  40 mg Oral BID (diuretic) Maynor Wisdom MD Frances      hydrOXYzine HCL  25 mg Oral Q6H PRN Clair Castillo MD      insulin glargine  30 Units Subcutaneous HS Toula Night, MD      insulin lispro  1-5 Units Subcutaneous HS Treasure Finley, MD      insulin lispro  1-6 Units Subcutaneous TID AC Treasure Finley, MD      insulin lispro  15 Units Subcutaneous TID With Meals Ramya Lai MD      ipratropium  0 5 mg Nebulization TID Werner Sandhu MD      levalbuterol  0 63 mg Nebulization TID PRN Judah Collins MD      levalbuterol  1 25 mg Nebulization TID Werner Sandhu MD      lidocaine  2 patch Topical Daily Treasure Jacque Finley, MD      LORazepam  0 5 mg Oral Q8H PRN Clair Castillo MD      metoprolol succinate  50 mg Oral Q12H Treasure Fleet Tushar, MD      mirtazapine  7 5 mg Oral HS Treasuredaniel Finley, MD      oxyCODONE-acetaminophen  1 tablet Oral Q4H PRN Julita Villagomez MD      pantoprazole  40 mg Oral Early Morning Treasure Fleet Flash, MD      polyethylene glycol  17 g Oral Daily Treasure Flemark Flash, MD      pregabalin  75 mg Oral Daily Treasure Jacque Flash, MD      sodium chloride  3 mL Nebulization TID PRN Judah Collins MD      ticagrelor  90 mg Oral Q12H [address] Clair Castillo MD          Today, Patient Was Seen By: Eladia La MD    **Please Note: This note may have been constructed using a voice recognition system  **

## 2022-08-29 NOTE — PHYSICAL THERAPY NOTE
PHYSICAL THERAPY EVALUATION  NAME: Kelli Red  AGE:   65 y o  MRN:  [id number]  ADMIT DX: Respiratory distress [R06 03]  Hypoxia [R09 02]  COVID-19 [U07 1]    PMH:   Past Medical History:   Diagnosis Date    Anxiety     Aortic aneurysm (HCC)     Arthritis     Depression     Diabetes mellitus ([facility] )     Fibromyalgia     GERD (gastroesophageal reflux disease)     GERD (gastroesophageal reflux disease)     H/O cardiovascular stress test 2018    no ischemia  EF 70%  H/O echocardiogram 2019    EF 60%  Mild LVH  trivial effusion  Hyperlipidemia     Hypertension     Migraines     Psychiatric disorder     anxiety    Uncontrolled hypertension 2015    Last Assessment & Plan:  BP today above goal <140/90, apparently asymptomatic  Prior BP elevations were attributed to not taking medication  Consider increased medication if persistent on f/u  LENGTH OF STAY: 5       22 1218   PT Last Visit   PT Visit Date 22   Note Type   Note type Evaluation   Pain Assessment   Pain Assessment Tool 0-10   Pain Score No Pain   Restrictions/Precautions   Weight Bearing Precautions Per Order No   Other Precautions Contact/isolation; Airborne/isolation;Multiple lines;O2;Fall Risk;Telemetry  (trach collar)   Home Living   Additional Comments Pt reports she recently lost her home and was admitted from rehab  Pt reports she was ambulating independently without AD and able to negotiate stairs without assist at baseline  Prior Function   Level of [place] Independent with ADLs and functional mobility   Lives With Son   Receives Help From Family   ADL Assistance Independent   IADLs Needs assistance   General   Family/Caregiver Present No   Cognition   Overall Cognitive Status WFL   Arousal/Participation Cooperative   Orientation Level Oriented X4   Memory Within functional limits   Following Commands Follows all commands and directions without difficulty   Comments Pt identified by name and   Subjective   Subjective Agrees to PT evaluation  Pleasant and cooperative  RLE Assessment   RLE Assessment X   Strength RLE   RLE Overall Strength 4-/5  (functionally)   LLE Assessment   LLE Assessment X   Strength LLE   LLE Overall Strength 4-/5  (functionally)   Bed Mobility   Supine to Sit 5  Supervision   Additional items HOB elevated; Bedrails; Increased time required;Verbal cues   Sit to Supine 5  Supervision   Additional items HOB elevated; Bedrails; Increased time required;Verbal cues   Transfers   Sit to Stand 4  Minimal assistance   Additional items Assist x 1; Increased time required;Verbal cues   Stand to Sit 4  Minimal assistance   Additional items Assist x 1; Increased time required;Verbal cues   Stand pivot 4  Minimal assistance   Additional items Assist x 1; Increased time required;Verbal cues  (with RW)   Ambulation/Elevation   Gait pattern Improper Weight shift;Decreased foot clearance; Forward Flexion; Short stride; Excessively slow  (increased lateral sway)   Gait Assistance 4  Minimal assist   Additional items Assist x 1;Verbal cues   Assistive Device Rolling walker   Distance 4` forward/backward x2  (limited by lines and fatigue)   Balance   Static Sitting Good   Dynamic Sitting Fair +   Static Standing Fair -   Dynamic Standing Poor +   Ambulatory Poor +  (with RW)   Endurance Deficit   Endurance Deficit Yes   Endurance Deficit Description limited standing/ambulation tolerance   Activity Tolerance   Activity Tolerance Patient limited by fatigue;Patient tolerated treatment well   Nurse Made Aware Per RN, pt appropriate to evaluate   Assessment   Prognosis Fair   Problem List Decreased strength;Decreased endurance; Impaired balance;Decreased mobility;Obesity   Assessment Pt is a 65 y o  female seen for PT evaluation s/p admit to [address] [place] [address] on 8/18/2022 w/ UTI (urinary tract infection)  Order placed for PT   Comorbidities affecting pt's physical performance at time of assessment include: obesity, neuropathy and vertigo  Personal factors affecting pt at time of IE include: anxiety, depression and limited home support  Prior to admission, pt was was independent w/ all functional mobility w/ out AD and was admitted from rehab  Upon evaluation: Pt requires supervision for bed mobility, min A for sit to stand, and min A for short distance ambulation with RW  (Please find full objective findings from PT assessment regarding body systems outlined above)  Impairments and limitations also listed above, especially due to  weakness, impaired balance, decreased endurance, gait deviations, decreased activity tolerance, fall risk and SOB upon exertion  Pt's clinical presentation is currently unstable/unpredictable seen in pt's presentation of fall risk, decline in functional mobility compared to baseline, and SOB with mobility  Pt to benefit from continued skilled PT tx while in hospital and upon DC to address deficits as defined above and maximize level of functional mobility  From PT/mobility standpoint, recommendation at time of d/c would be to return to rehab pending progress/potential stair negotiation in order to maximize pt's functional independence and consistency w/ mobility in order to facilitate return to PLOF  Recommend progression of ambulation, stair negotiation, and initiation of HEP as appropriate     Goals   Patient Goals to get stronger   STG Expiration Date 09/08/22   Short Term Goal #1 Pt will be able to: (1) perform bed mobility with mod I to promote OOB activity (2) perform sit to stand with supervision to decrease burden of care (3) ambulate at least 200` with supervision and least restrictive AD to increase activity tolerance (4) increase standing balance by 1 grade to decrease risk of falls (5) negotiate at least a full flight of stairs with supervision and use of handrail to allow safe access into home if needed   PT Treatment Day 0   Plan   Treatment/Interventions Functional transfer training;LE strengthening/ROM; Elevations; Therapeutic exercise; Endurance training;Patient/family training;Equipment eval/education; Bed mobility;Gait training   PT Frequency 3-5x/wk   Recommendation   PT Discharge Recommendation Post acute rehabilitation services  (return to rehab)   Equipment Recommended [facility] Recommended Wheeled walker   [facility] Inpatient   Turning in Bed Without Bedrails 3   Lying on Back to Sitting on Edge of Flat Bed 3   Moving Bed to Chair 3   Standing Up From Chair 3   Walk in Room 2   Climb 3-5 Stairs 1   Basic Mobility Inpatient Raw Score 15   Basic Mobility Standardized Score 36 97   Highest Level Of Mobility   -[facility] Goal 4: Move to chair/commode   -[facility] Achieved 5: Stand (1 or more minutes)   End of Consult   Patient Position at End of Consult Supine; All needs within reach     The patient's [facility] Basic Mobility Inpatient Short Form Raw Score is 15, Standardized Score is 36 97   A Raw Score of less than 16 suggests the patient may benefit from discharge to post-acute rehabilitation services, which DOES coincide with CURRENT above PT recommendations  However please refer to therapist recommendation for discharge planning given other factors that may influence destination  Adapted from [employer] 6-Clicks Basic Mobility and Daily Activity Scores With Discharge Destination  Physical Therapy, 2021;101:1-9   DOI: 10 [email or web address]      Abraham Lamb PT,DPT

## 2022-08-29 NOTE — CASE MANAGEMENT
Case Management Discharge Planning Note    Patient name Dick PERKINS /S -01 MRN [id number]  : 1965 Date 2022       Current Admission Date: 2022  Current Admission Diagnosis:Acute Respiratory insufficiency on chronic hypoxic respiratory failure   Patient Active Problem List    Diagnosis Date Noted    D[facility]deep venous thrombosis) [facility] ) 2022    Empyema lung, Right 2022    Chest wall wound infection 2022    Chronic heart failure with preserved ejection fraction ([facility] ) 2022    Pain at Chest tube insertion site    2022    Acute Respiratory insufficiency on chronic hypoxic respiratory failure 2022    Leukocytosis 2022    Hyponatremia 2022    Chronic kidney disease 2022    Hemoptysis 2022    Hypomagnesemia 2022    Chest pain 2022    Moderate protein-calorie malnutrition ([facility] ) 2022    Thoracic aortic aneurysm, ruptured ([facility] ) 2022    Shortness of breath 2022    Prolonged Q-T interval on ECG 2022    COVID-19 2022    Elevated troponin 2022    Anemia 2022    Insomnia secondary to anxiety 2022    Chronic hypoxemic respiratory failure ([facility] ) 03/15/2022    Tracheostomy in place [facility]) 2022    Subglottic stenosis 2022    CAD (coronary artery disease) 2021    Urinary retention 2021    Cerebral aneurysm 2021    Cerebral vascular accident ([facility] ) 2021    History of Cardiac arrest ([facility] ) 2021    A-fib ([facility] ) 10/30/2021    History of Ventricular tachycardia ([facility] ) 10/27/2021    MODESTO (acute kidney injury) ([facility] ) 10/24/2021    Cellulitis of left lower extremity 2021    Hypoglycemia 2021    Polypharmacy 2021    Trigger finger, right middle finger 2021    Trigger finger, right ring finger 2021    Diarrhea 2021    Nasal vestibulitis 2021    Orthopnea 2021    CHANTALE (obstructive sleep apnea) 01/14/2021    Palpitations 11/17/2020    Abscess 10/11/2020    Bacterial vaginosis 07/09/2020    Urinary tract infection with hematuria 02/03/2020    Epigastric pain 07/02/2019    Thoracic aortic aneurysm without rupture (HCC) 08/12/2018    Hypokalemia 08/11/2018    Abnormal urinalysis 08/11/2018    Vaginal discharge 04/12/2018    Yeast vaginitis 04/12/2018    Recurrent vaginitis 04/12/2018    Cardiac murmur 12/15/2017    Primary insomnia 12/15/2017    Anxiety 07/26/2017    Depression, recurrent ([facility] ) 07/26/2017    Retinal hemorrhage due to secondary diabetes ([facility] ) 07/26/2017    Fibromyalgia 07/26/2017    Hypertension 07/26/2017    Hypoestrogenism 07/26/2017    Neuropathy 07/26/2017    Chronic pain disorder 06/05/2017    Medically complex patient 06/05/2017    Change in bowel habit 04/24/2017    Screening for colon cancer 12/05/2016    Cough 02/15/2016    Non compliance with medical treatment 01/27/2016    Dizziness 01/26/2016    Gastroesophageal reflux disease with esophagitis 01/26/2016    Vertigo 01/26/2016    Vertebral body hemangioma 08/21/2015    Hemangioma of bone 07/30/2015    Right-sided thoracic back pain 07/23/2015    Thoracic radiculitis 07/23/2015    Carpal tunnel syndrome of left wrist 06/26/2015    Tobacco abuse 06/26/2015    Type 2 diabetes mellitus with hyperglycemia ([facility] ) 06/09/2015    Hyperlipidemia associated with type 2 diabetes mellitus ([facility] ) 05/06/2015    Noncompliance with diet and medication regimen 05/06/2015    Shingles 03/25/2015    Diabetic peripheral neuropathy ([facility] ) 02/25/2015    Low back pain 02/25/2015    Lumbar radiculopathy 02/25/2015    Overweight 02/25/2015    Sciatica of left side 02/25/2015      LOS (days): 5  Geometric Mean LOS (GMLOS) (days): 5 40  Days to GMLOS:0 4     OBJECTIVE:  Risk of Unplanned Readmission Score: 68 48         Current admission status: Inpatient   Preferred Pharmacy:   [id number] [address] - [address] [facility]  [address] [facility]  [address]  Phone: [phone] Fax: [address], [address]  [address]  [place] [address]  Phone: [phone] Fax: [phone]    [facility] [address], [place] [facility] [address] Tequila Rangel [address] [place] [address] [facility] [address]  Phone: [phone] Fax: [address] [place] [place] [facility]  Phone: [phone] Fax: [phone]    Primary Care Provider: Louisa Haskins MD    Primary Insurance: [facility]  Secondary Insurance: Elio Dillon    DISCHARGE DETAILS:    Discharge planning discussed with[de-identified] Milagros,  and James bryson, by phone  [place] of Choice: Yes (re: facility placement)  Comments - Freedom of Choice: agreeable for blanket referrals to be made, but relays preference for something close to [address]  CM contacted family/caregiver?: Yes (braydon and Milagros xie)  Were Treatment Team discharge recommendations reviewed with patient/caregiver?: Yes  Did patient/caregiver verbalize understanding of patient care needs?: N/A- going to facility  Were patient/caregiver advised of the risks associated with not following Treatment Team discharge recommendations?: Yes    Contacts  Patient Contacts: James bryson and Milagros xie  Relationship to Patient[de-identified] Family  Contact Method: Phone  Reason/Outcome: Continuity of Care, Emergency Contact, Referral, Discharge Planning              Other Referral/Resources/Interventions Provided:  Interventions: SNF  Referral Comments: PT eval recommending rehab  VM received from Milagros xie, from Friday reporting that patient was evicted from her home and will need to go to a facility  Return call made to Milagros and also called James bryson, to discuss discharge plan   Family confirms that patient will need facility placement at discharge, likely for long-term care, as patient/son have been evicted from their apartment  Patient had o2 arranged with [facility] - script is , but was originally set-up for 10L via trach collar back in February; sister, however reports that she had been on 4-6L while at home  Both sister and son in agreement for blanket referral to be sent  [facility] will also be started as patient likely to need long-term care  Referrals sent to SNF via [address]; awaiting responses      Would you like to participate in our [address] service program?  : No - Declined    Treatment Team Recommendation: SNF  Discharge Destination Plan[de-identified] SNF

## 2022-08-29 NOTE — PROGRESS NOTES
Progress Note - Pulmonary   Benjamín Miu 65 y o  female MRN: [id number]  Unit/Bed#: S -01 Encounter: [id number]    Assessment:  1  Acute on chronic hypoxic respiratory failure due to COVID-19 pneumonia, resolved  2  Tracheostomy status after MI in 11/21  3  Acute DVT, which occurred on Eliquis  Plan:  1  Patient is below her baseline oxygen requirement and from my perspective can leave at any point  2  Given clinical improvement, despite positive procalcitonin levels, would not initiate antibiotics  3  Continue heparin and decadron until discharge  No indication for tocilizumab/baricitinib in my opinion  4  Completed remdesivir today  5  Continue nebulizers, would continue at discharge  6  Defer anticoagulation recommendations to Hematology/Oncology  Chief Complaint:   None  Subjective:   She feels well  She does have a significant cough with some thick mucus production, unclear if that part is improving  Objective:     Vitals: Blood pressure (!) 177/93, pulse (!) 54, temperature 97 8 °F (36 6 °C), resp  rate 18, weight 95 kg (209 lb 7 oz), SpO2 95 %  ,Body mass index is 30 93 kg/m²  Intake/Output Summary (Last 24 hours) at 8/29/2022 1031  Last data filed at 8/28/2022 2356  Gross per 24 hour   Intake --   Output 500 ml   Net -500 ml       Invasive Devices  Report    Peripherally Inserted Central Catheter Line  Duration           PICC Line 08/11/22 Right Brachial 18 days          Peripheral Intravenous Line  Duration           Peripheral IV 08/27/22 Distal;Left;Ventral (anterior) Forearm 1 day          Airway  Duration           Surgical Airway Shiley Fenestrated 180 days              Physical Exam:   General:  No acute distress  Unlabored respirations  Rare cough  HEENT:  PERRL   MMM  Chest:  Clear to auscultation bilaterally  Cardiovascular:  S1 + S2, RRR, no M/R/G  Abdomen:  Soft, nontender, nondistended, no palpable masses or organomegaly    Extremities:  No significant edema  Neuro:  No focal deficits, grossly intact  Psych:  Normal mood and affect  Labs: I have personally reviewed pertinent lab results    Imaging and other studies: I have personally reviewed pertinent films in PACS

## 2022-08-29 NOTE — TELEPHONE ENCOUNTER
New Patient Intake Form   Patient Details:    Parisa Guerra  1965    Appointment Information   Who is calling to schedule? Humphrey Glaser   If not self, what is the caller's name? NA   DID YOU CONFIRM INSURANCE WITH PATIENT? Routed to finance   Referring provider Valeri Blackmon, [facility]   What is the diagnosis? LLE DVT, AC recommendations     Is there a confirmed tissue diagnosis? NA     Is there a biopsy ordered or pending? Please specify dates  If yes, route to /OCC   NA     Is patient aware of diagnosis? Yes     Have you had any imaging or labs done? If yes, where? (If imaging done outside of [facility], please remind patient to bring a disk ) Yes-[facility]     If imaging done at outside facility, did you instruct patient to obtain discs and bring to visit? NA   Have you been seen by another Oncologist/Hematologist?  If so, who and where? No   Are the records in [facility] or Care Everywhere? Yes   Does the patient have records at another facility/hospital?    If yes, Name of facility, city and state where facility is located  Yes-[facility]     Did you instruct patient to have records faxed to rightx and provide rightfax number? NA   Preferred [place]   Is the patient willing to be seen by another provider? (This is for breast patients only) NA     Did you send new patient paperwork? Email or mail? NA   Miscellaneous Information: The patient is scheduled for a HFU appointment with NEELAM Medrano on 9/26 in the [facility] office at 0900

## 2022-08-29 NOTE — PLAN OF CARE
Problem: MOBILITY - ADULT  Goal: Maintain or return to baseline ADL function  Description: INTERVENTIONS:  -  Assess patient's ability to carry out ADLs; assess patient's baseline for ADL function and identify physical deficits which impact ability to perform ADLs (bathing, care of mouth/teeth, toileting, grooming, dressing, etc )  - Assess/evaluate cause of self-care deficits   - Assess range of motion  - Assess patient's mobility; develop plan if impaired  - Assess patient's need for assistive devices and provide as appropriate  - Encourage maximum independence but intervene and supervise when necessary  - Involve family in performance of ADLs  - Assess for home care needs following discharge   - Consider OT consult to assist with ADL evaluation and planning for discharge  - Provide patient education as appropriate  Outcome: Progressing  Goal: Maintains/Returns to pre admission functional level  Description: INTERVENTIONS:  - Perform BMAT or MOVE assessment daily    - Set and communicate daily mobility goal to care team and patient/family/caregiver  - Collaborate with rehabilitation services on mobility goals if consulted  - Perform Range of Motion  times a day  - Reposition patient every  hours    - Dangle patient  times a day  - Stand patient  times a day  - Ambulate patient  times a day  - Out of bed to chair  times a day   - Out of bed for meals times a day  - Out of bed for toileting  - Record patient progress and toleration of activity level   Outcome: Progressing     Problem: Potential for Falls  Goal: Patient will remain free of falls  Description: INTERVENTIONS:  - Educate patient/family on patient safety including physical limitations  - Instruct patient to call for assistance with activity   - Consult OT/PT to assist with strengthening/mobility   - Keep Call bell within reach  - Keep bed low and locked with side rails adjusted as appropriate  - Keep care items and personal belongings within reach  - Initiate and maintain comfort rounds  - Make Fall Risk Sign visible to staff  - Offer Toileting every  Hours, in advance of need  - Initiate/Maintain rm  - Obtain necessary fall risk management equipment:  Apply yellow socks and bracelet for high fall risk patients  - Consider moving patient to room near nurses station  Outcome: Progressing     Problem: Prexisting or High Potential for Compromised Skin Integrity  Goal: Skin integrity is maintained or improved  Description: INTERVENTIONS:  - Identify patients at risk for skin breakdown  - Assess and monitor skin integrity  - Assess and monitor nutrition and hydration status  - Monitor labs   - Assess for incontinence   - Turn and reposition patient  - Assist with mobility/ambulation  - Relieve pressure over bony prominences  - Avoid friction and shearing  - Provide appropriate hygiene as needed including keeping skin clean and dry  - Evaluate need for skin moisturizer/barrier cream  - Collaborate with interdisciplinary team   - Patient/family teaching  - Consider wound care consult   Outcome: Progressing     Problem: RESPIRATORY - ADULT  Goal: Achieves optimal ventilation and oxygenation  Description: INTERVENTIONS:  - Assess for changes in respiratory status  - Assess for changes in mentation and behavior  - Position to facilitate oxygenation and minimize respiratory effort  - Oxygen administered by appropriate delivery if ordered  - Initiate smoking cessation education as indicated  - Encourage broncho-pulmonary hygiene including cough, deep breathe, Incentive Spirometry  - Assess the need for suctioning and aspirate as needed  - Assess and instruct to report SOB or any respiratory difficulty  - Respiratory Therapy support as indicated  Outcome: Progressing     Problem: Nutrition/Hydration-ADULT  Goal: Nutrient/Hydration intake appropriate for improving, restoring or maintaining nutritional needs  Description: Monitor and assess patient's nutrition/hydration status for malnutrition  Collaborate with interdisciplinary team and initiate plan and interventions as ordered  Monitor patient's weight and dietary intake as ordered or per policy  Utilize nutrition screening tool and intervene as necessary  Determine patient's food preferences and provide high-protein, high-caloric foods as appropriate       INTERVENTIONS:  - Monitor oral intake, urinary output, labs, and treatment plans  - Assess nutrition and hydration status and recommend course of action  - Evaluate amount of meals eaten  - Assist patient with eating if necessary   - Allow adequate time for meals  - Recommend/ encourage appropriate diets, oral nutritional supplements, and vitamin/mineral supplements  - Order, calculate, and assess calorie counts as needed  - Recommend, monitor, and adjust tube feedings and TPN/PPN based on assessed needs  - Assess need for intravenous fluids  - Provide specific nutrition/hydration education as appropriate  - Include patient/family/caregiver in decisions related to nutrition  Outcome: Progressing

## 2022-08-29 NOTE — ASSESSMENT & PLAN NOTE
Was found to have DVT of left soleus vein during this admission, elevated D-dimer to 6 2  DVT is chronic versus subacute nonocclusive  This was in the setting of Eliquis use due to atrial fibrillation  Suspected Eliquis failure  Plan:  Hematology consulted  On recommendations for 2 anticoagulation therapy in the setting of Eliquis failure  Hematology recommending Pradaxa, if this medication is too expensive patient can be started on Coumadin     switched  Patient to [address] [place] [address]   outpatient hematology evaluation in 1-2 months

## 2022-08-30 ENCOUNTER — TELEPHONE (OUTPATIENT)
Dept: OTHER | Facility: OTHER | Age: 57
End: 2022-08-30

## 2022-08-30 VITALS
SYSTOLIC BLOOD PRESSURE: 154 MMHG | RESPIRATION RATE: 18 BRPM | DIASTOLIC BLOOD PRESSURE: 77 MMHG | BODY MASS INDEX: 27.67 KG/M2 | TEMPERATURE: 99.9 F | WEIGHT: 187.39 LBS | HEART RATE: 59 BPM | OXYGEN SATURATION: 97 %

## 2022-08-30 LAB
ANION GAP SERPL CALCULATED.3IONS-SCNC: 6 MMOL/L (ref 4–13)
BASOPHILS # BLD AUTO: 0.03 THOUSANDS/ΜL (ref 0–0.1)
BASOPHILS NFR BLD AUTO: 0 % (ref 0–1)
BUN SERPL-MCNC: 29 MG/DL (ref 5–25)
CALCIUM SERPL-MCNC: 8.1 MG/DL (ref 8.4–10.2)
CHLORIDE SERPL-SCNC: 105 MMOL/L (ref 96–108)
CO2 SERPL-SCNC: 31 MMOL/L (ref 21–32)
CREAT SERPL-MCNC: 0.98 MG/DL (ref 0.6–1.3)
EOSINOPHIL # BLD AUTO: 0.11 THOUSAND/ΜL (ref 0–0.61)
EOSINOPHIL NFR BLD AUTO: 1 % (ref 0–6)
ERYTHROCYTE [DISTWIDTH] IN BLOOD BY AUTOMATED COUNT: 20 % (ref 11.6–15.1)
GFR SERPL CREATININE-BSD FRML MDRD: 64 ML/MIN/1.73SQ M
GLUCOSE SERPL-MCNC: 146 MG/DL (ref 65–140)
GLUCOSE SERPL-MCNC: 179 MG/DL (ref 65–140)
GLUCOSE SERPL-MCNC: 275 MG/DL (ref 65–140)
HCT VFR BLD AUTO: 28.8 % (ref 34.8–46.1)
HGB BLD-MCNC: 9.1 G/DL (ref 11.5–15.4)
IMM GRANULOCYTES # BLD AUTO: 0.25 THOUSAND/UL (ref 0–0.2)
IMM GRANULOCYTES NFR BLD AUTO: 2 % (ref 0–2)
LYMPHOCYTES # BLD AUTO: 1.87 THOUSANDS/ΜL (ref 0.6–4.47)
LYMPHOCYTES NFR BLD AUTO: 15 % (ref 14–44)
MCH RBC QN AUTO: 26.1 PG (ref 26.8–34.3)
MCHC RBC AUTO-ENTMCNC: 31.6 G/DL (ref 31.4–37.4)
MCV RBC AUTO: 83 FL (ref 82–98)
MONOCYTES # BLD AUTO: 1.09 THOUSAND/ΜL (ref 0.17–1.22)
MONOCYTES NFR BLD AUTO: 9 % (ref 4–12)
MYCOBACTERIUM SPEC CULT: NORMAL
MYCOBACTERIUM SPEC CULT: NORMAL
NEUTROPHILS # BLD AUTO: 9.11 THOUSANDS/ΜL (ref 1.85–7.62)
NEUTS SEG NFR BLD AUTO: 73 % (ref 43–75)
NRBC BLD AUTO-RTO: 0 /100 WBCS
PLATELET # BLD AUTO: 392 THOUSANDS/UL (ref 149–390)
PMV BLD AUTO: 10.2 FL (ref 8.9–12.7)
POTASSIUM SERPL-SCNC: 3.7 MMOL/L (ref 3.5–5.3)
RBC # BLD AUTO: 3.48 MILLION/UL (ref 3.81–5.12)
RHODAMINE-AURAMINE STN SPEC: NORMAL
RHODAMINE-AURAMINE STN SPEC: NORMAL
SODIUM SERPL-SCNC: 142 MMOL/L (ref 135–147)
WBC # BLD AUTO: 12.46 THOUSAND/UL (ref 4.31–10.16)

## 2022-08-30 PROCEDURE — 99232 SBSQ HOSP IP/OBS MODERATE 35: CPT | Performed by: INTERNAL MEDICINE

## 2022-08-30 PROCEDURE — 85025 COMPLETE CBC W/AUTO DIFF WBC: CPT

## 2022-08-30 PROCEDURE — 94640 AIRWAY INHALATION TREATMENT: CPT

## 2022-08-30 PROCEDURE — 80048 BASIC METABOLIC PNL TOTAL CA: CPT

## 2022-08-30 PROCEDURE — 94760 N-INVAS EAR/PLS OXIMETRY 1: CPT

## 2022-08-30 PROCEDURE — 99239 HOSP IP/OBS DSCHRG MGMT >30: CPT | Performed by: HOSPITALIST

## 2022-08-30 PROCEDURE — 82948 REAGENT STRIP/BLOOD GLUCOSE: CPT

## 2022-08-30 RX ORDER — INSULIN LISPRO 100 [IU]/ML
10 INJECTION, SOLUTION INTRAVENOUS; SUBCUTANEOUS
Qty: 15 ML | Refills: 0 | Status: SHIPPED | OUTPATIENT
Start: 2022-08-30 | End: 2022-10-27 | Stop reason: SDUPTHER

## 2022-08-30 RX ORDER — DABIGATRAN ETEXILATE 150 MG/1
150 CAPSULE ORAL EVERY 12 HOURS SCHEDULED
Refills: 0
Start: 2022-08-30 | End: 2022-10-27 | Stop reason: SDUPTHER

## 2022-08-30 RX ORDER — INSULIN GLARGINE 100 [IU]/ML
35 INJECTION, SOLUTION SUBCUTANEOUS
Status: DISCONTINUED | OUTPATIENT
Start: 2022-08-30 | End: 2022-08-30 | Stop reason: HOSPADM

## 2022-08-30 RX ORDER — FUROSEMIDE 40 MG/1
40 TABLET ORAL 2 TIMES DAILY
Refills: 0
Start: 2022-08-30 | End: 2022-10-27 | Stop reason: SDUPTHER

## 2022-08-30 RX ORDER — INSULIN LISPRO 100 [IU]/ML
18 INJECTION, SOLUTION INTRAVENOUS; SUBCUTANEOUS
Status: DISCONTINUED | OUTPATIENT
Start: 2022-08-30 | End: 2022-08-30 | Stop reason: HOSPADM

## 2022-08-30 RX ORDER — AMLODIPINE BESYLATE 5 MG/1
5 TABLET ORAL DAILY
Refills: 0
Start: 2022-08-31 | End: 2022-10-27 | Stop reason: SDUPTHER

## 2022-08-30 RX ADMIN — INSULIN LISPRO 4 UNITS: 100 INJECTION, SOLUTION INTRAVENOUS; SUBCUTANEOUS at 16:25

## 2022-08-30 RX ADMIN — FUROSEMIDE 40 MG: 40 TABLET ORAL at 09:40

## 2022-08-30 RX ADMIN — METOPROLOL SUCCINATE 50 MG: 50 TABLET, EXTENDED RELEASE ORAL at 16:25

## 2022-08-30 RX ADMIN — PREGABALIN 75 MG: 75 CAPSULE ORAL at 09:39

## 2022-08-30 RX ADMIN — INSULIN LISPRO 1 UNITS: 100 INJECTION, SOLUTION INTRAVENOUS; SUBCUTANEOUS at 12:00

## 2022-08-30 RX ADMIN — INSULIN LISPRO 15 UNITS: 100 INJECTION, SOLUTION INTRAVENOUS; SUBCUTANEOUS at 09:46

## 2022-08-30 RX ADMIN — LIDOCAINE 5% 2 PATCH: 700 PATCH TOPICAL at 09:00

## 2022-08-30 RX ADMIN — DABIGATRAN ETEXILATE MESYLATE 150 MG: 150 CAPSULE ORAL at 09:39

## 2022-08-30 RX ADMIN — ATORVASTATIN CALCIUM 40 MG: 40 TABLET, FILM COATED ORAL at 16:25

## 2022-08-30 RX ADMIN — LEVALBUTEROL HYDROCHLORIDE 1.25 MG: 1.25 SOLUTION, CONCENTRATE RESPIRATORY (INHALATION) at 08:10

## 2022-08-30 RX ADMIN — PANTOPRAZOLE SODIUM 40 MG: 40 TABLET, DELAYED RELEASE ORAL at 05:18

## 2022-08-30 RX ADMIN — DEXAMETHASONE SODIUM PHOSPHATE 6 MG: 4 INJECTION INTRA-ARTICULAR; INTRALESIONAL; INTRAMUSCULAR; INTRAVENOUS; SOFT TISSUE at 09:39

## 2022-08-30 RX ADMIN — INSULIN LISPRO 18 UNITS: 100 INJECTION, SOLUTION INTRAVENOUS; SUBCUTANEOUS at 12:01

## 2022-08-30 RX ADMIN — TICAGRELOR 90 MG: 90 TABLET ORAL at 09:40

## 2022-08-30 RX ADMIN — IPRATROPIUM BROMIDE 0.5 MG: 0.5 SOLUTION RESPIRATORY (INHALATION) at 08:10

## 2022-08-30 RX ADMIN — AMIODARONE HYDROCHLORIDE 100 MG: 200 TABLET ORAL at 09:39

## 2022-08-30 RX ADMIN — AMLODIPINE BESYLATE 5 MG: 5 TABLET ORAL at 09:40

## 2022-08-30 RX ADMIN — INSULIN LISPRO 18 UNITS: 100 INJECTION, SOLUTION INTRAVENOUS; SUBCUTANEOUS at 16:26

## 2022-08-30 RX ADMIN — METOPROLOL SUCCINATE 50 MG: 50 TABLET, EXTENDED RELEASE ORAL at 05:18

## 2022-08-30 RX ADMIN — LEVALBUTEROL HYDROCHLORIDE 1.25 MG: 1.25 SOLUTION, CONCENTRATE RESPIRATORY (INHALATION) at 14:41

## 2022-08-30 RX ADMIN — FUROSEMIDE 40 MG: 40 TABLET ORAL at 16:25

## 2022-08-30 RX ADMIN — IPRATROPIUM BROMIDE 0.5 MG: 0.5 SOLUTION RESPIRATORY (INHALATION) at 14:41

## 2022-08-30 RX ADMIN — INSULIN LISPRO 5 UNITS: 100 INJECTION, SOLUTION INTRAVENOUS; SUBCUTANEOUS at 09:46

## 2022-08-30 RX ADMIN — ESCITALOPRAM OXALATE 10 MG: 10 TABLET ORAL at 09:39

## 2022-08-30 NOTE — CASE MANAGEMENT
Case Management Discharge Planning Note    Patient name Dick PERKINS /S -01 MRN [id number]  : 1965 Date 2022       Current Admission Date: 2022  Current Admission Diagnosis:Acute Respiratory insufficiency on chronic hypoxic respiratory failure   Patient Active Problem List    Diagnosis Date Noted    DVT (deep venous thrombosis) ([facility] ) 2022    Empyema lung, Right 2022    Chest wall wound infection 2022    Chronic heart failure with preserved ejection fraction ([facility] ) 2022    Pain at Chest tube insertion site    2022    Acute Respiratory insufficiency on chronic hypoxic respiratory failure 2022    Leukocytosis 2022    Hyponatremia 2022    Chronic kidney disease 2022    Hemoptysis 2022    Hypomagnesemia 2022    Chest pain 2022    Moderate protein-calorie malnutrition ([facility] ) 2022    [facility] ruptured [facility] 2022    Shortness of breath 2022    Prolonged Q-T interval on ECG 2022    COVID-19 2022    Elevated troponin 2022    Anemia 2022    Insomnia secondary to anxiety 2022    Chronic hypoxemic respiratory failure ([facility] ) 03/15/2022    Tracheostomy in place [facility]) 2022    Subglottic stenosis 2022    CAD (coronary artery disease) 2021    Urinary retention 2021    Cerebral aneurysm 2021    Cerebral vascular accident ([facility] ) 2021    History of Cardiac arrest ([facility] ) 2021    A-fib ([address] ) 10/30/2021    History of Ventricular tachycardia ([facility] ) 10/27/2021    MODESTO (acute kidney injury) ([facility] ) 10/24/2021    Cellulitis of left lower extremity 2021    Hypoglycemia 2021    Polypharmacy 2021    Trigger finger, right middle finger 2021    Trigger finger, right ring finger 2021    Diarrhea 2021    Nasal vestibulitis 2021    Orthopnea 2021    CHANTALE (obstructive sleep apnea) 01/14/2021    Palpitations 11/17/2020    Abscess 10/11/2020    Bacterial vaginosis 07/09/2020    Urinary tract infection with hematuria 02/03/2020    Epigastric pain 07/02/2019    Thoracic aortic aneurysm without rupture (HCC) 08/12/2018    Hypokalemia 08/11/2018    Abnormal urinalysis 08/11/2018    Vaginal discharge 04/12/2018    Yeast vaginitis 04/12/2018    Recurrent vaginitis 04/12/2018    Cardiac murmur 12/15/2017    Primary insomnia 12/15/2017    Anxiety 07/26/2017    Depression, recurrent ([facility] ) 07/26/2017    Retinal hemorrhage due to secondary diabetes ([facility] ) 07/26/2017    Fibromyalgia 07/26/2017    Hypertension 07/26/2017    Hypoestrogenism 07/26/2017    Neuropathy 07/26/2017    Chronic pain disorder 06/05/2017    Medically complex patient 06/05/2017    Change in bowel habit 04/24/2017    Screening for colon cancer 12/05/2016    Cough 02/15/2016    Non compliance with medical treatment 01/27/2016    Dizziness 01/26/2016    Gastroesophageal reflux disease with esophagitis 01/26/2016    Vertigo 01/26/2016    Vertebral body hemangioma 08/21/2015    Hemangioma of bone 07/30/2015    Right-sided thoracic back pain 07/23/2015    Thoracic radiculitis 07/23/2015    Carpal tunnel syndrome of left wrist 06/26/2015    Tobacco abuse 06/26/2015    Type 2 diabetes mellitus with hyperglycemia ([facility] ) 06/09/2015    Hyperlipidemia associated with type 2 diabetes mellitus ([facility] ) 05/06/2015    Noncompliance with diet and medication regimen 05/06/2015    Shingles 03/25/2015    Diabetic peripheral neuropathy ([facility] ) 02/25/2015    Low back pain 02/25/2015    Lumbar radiculopathy 02/25/2015    Overweight 02/25/2015    Sciatica of left side 02/25/2015      LOS (days): 6  Geometric Mean LOS (GMLOS) (days): 5 40  Days to GMLOS:-0 8     OBJECTIVE:  Risk of Unplanned Readmission Score: 69 19         Current admission status: Inpatient   Preferred Pharmacy: [facility] #[id number] Hall - [address] [facility]  [address] [facility]  [address]  Phone: [phone] Fax: [address], [address]  [address]  [place] [address]  Phone: [phone] Fax: [phone]    [facility] [address] [facility], [address] [place] [address]  Phone: [phone] Fax: [address] [facility], [facility] [address] [place] [address] [place] [facility] 38 [facility]  Phone: [phone] Fax: [phone]    Primary Care Provider: Meghan Singleton MD    Primary Insurance: [address] [place] [address],[address] REP  Secondary Insurance: [address] [place] [address]    DISCHARGE DETAILS:    Discharge planning discussed with[de-identified] son, Elle Silva, and sister, Melanie Valdes, by phone  Freedom of Choice: Yes (re: facility placement)  Comments - Freedom of Choice: accepting bed at [address] [place] [address] contacted family/caregiver?: Yes (son and sister, Melanie Valdes)  Were Treatment Team discharge recommendations reviewed with patient/caregiver?: Yes  Did patient/caregiver verbalize understanding of patient care needs?: N/A- going to facility  Were patient/caregiver advised of the risks associated with not following Treatment Team discharge recommendations?: Yes    Contacts  Patient Contacts: sonElle and sisterMelanie  Relationship to Patient[de-identified] Family  Contact Method: Phone  Reason/Outcome: Continuity of Care, Emergency Contact, Referral, Discharge Planning              Other Referral/Resources/Interventions Provided:  Interventions: SNF, Short Term Rehab  Referral Comments: Per Remy  at [facility], patient able to return today  Transport requested in [employer] and ultimately confirmed for 8:00pm with [facility]  Call made to both patient's son, Elle Silva, and sister, Melanie Valdes, to inform of d/c for today and return to [facility]; both in agreement to same   MD and RN aware of pick-up time to return to [facility] this afternoon  AVS forwarded via Epic and attached in [address]  Would you like to participate in our [address] service program?  : No - Declined    Treatment Team Recommendation: SNF  Discharge Destination Plan[de-identified] SNF ([facility])  Transport at Discharge : [facility]  Dispatcher Contacted: Yes  Number/Name of Dispatcher: Yolanda Luevano and Unit #):  [facility]  ETA of Transport (Date): 08/30/22  ETA of Transport (Time): 2000 (confirmed)                            Accepting Facility Name, [address]  Receiving Facility/Agency Phone Number: [phone]  Facility/Agency Fax Number: [phone]

## 2022-08-30 NOTE — CASE MANAGEMENT
Case Management Progress Note    Patient name Jossue Chung  Location S /S -01 MRN [id number]  : 1965 Date 2022       LOS (days): 6  Geometric Mean LOS (GMLOS) (days): 5 40  Days to GMLOS:-0 8        OBJECTIVE:        Current admission status: Inpatient  Preferred Pharmacy:   [address], [address] [facility], [address]  [address] [facility]  [address]  [facility]  Phone: [phone] Fax: [address], [address] [facility] Nancy Flake  [address] [facility] [address]  Phone: [phone] Fax: [phone]    [place] [id number] #[id number] - [address], [facility] [address] [place] [address]  Phone: [phone] Fax: [address] [facility], [facility] [address] [place] [address] [facility] [address] [address]  Phone: [phone] Fax: [phone]    Primary Care Provider: Annmarie Fields MD    Primary Insurance: [facility]  Secondary Insurance: [address] [place] [address]    PROGRESS NOTE:    TT received from Declan Carney at [address] confirming ability to accept patient auth pending  Transport arrived early, so aware patient will be leaving shortly  AVS unable to be attached in [place], so faxed via [address] [place] [address] Rd

## 2022-08-30 NOTE — RESPIRATORY THERAPY NOTE
Pt suctioned for small amount thick yellow secretions  Pt also has a strong forceful caough   Trach care done and inner cannula changed

## 2022-08-30 NOTE — ASSESSMENT & PLAN NOTE
History of hypertension  Elevated blood pressures today 188/100  Elevated pressures most likely related to steroids      Plan:  Start on amlodipine 5 mg daily hold if systolic blood pressures less than 110

## 2022-08-30 NOTE — ASSESSMENT & PLAN NOTE
Wt Readings from Last 3 Encounters:   08/30/22 85 kg (187 lb 6 3 oz)   08/23/22 80 8 kg (178 lb 3 2 oz)   08/19/22 80 8 kg (178 lb 3 2 oz)       Recent admission to [facility] in July for CHF exacerbation  Patient was on Bumex 2 mg daily and Aldactone 100 mg daily  Following discharge following VATS decortication, Bumex was discontinued on August 13 and Aldactone was decreased to 50 mg daily  Patient was not placed on any diuretics following discharge from hospitalization  Chest x-ray for this admission demonstrates moderate pulmonary vascular congestion and small right pleural effusion  Most recent echocardiogram was in February of 2022 which showed EF of 50% with normal diastolic function  Recent BNP this admission of 8400    Plan:  Cardiology on board, following recommendations  Furosemide 40 mg b i d    Continue to monitor patient for signs of worsening heart failure  Monitor weights, ins and outs, serum creatinine electrolytes

## 2022-08-30 NOTE — ASSESSMENT & PLAN NOTE
Tested positive for COVID on May 19, 2022  Test positive again for COVID on August 24, 2022  Patient complains of thick sputum whitish to yellowish in color, no fevers no shortness of breath, requiring oxygen therapy, saturating 95% on 8 L  Symptoms unlikely significantly related to COVID due to resolution of symptoms in a timely manner      Plan:  Continue with mild COVID pathway  Completed remdesivir and dexamethasone  Continue with ipratropium and levalbuterol   Respiratory protocol

## 2022-08-30 NOTE — PLAN OF CARE
Problem: MOBILITY - ADULT  Goal: Maintain or return to baseline ADL function  Description: INTERVENTIONS:  -  Assess patient's ability to carry out ADLs; assess patient's baseline for ADL function and identify physical deficits which impact ability to perform ADLs (bathing, care of mouth/teeth, toileting, grooming, dressing, etc )  - Assess/evaluate cause of self-care deficits   - Assess range of motion  - Assess patient's mobility; develop plan if impaired  - Assess patient's need for assistive devices and provide as appropriate  - Encourage maximum independence but intervene and supervise when necessary  - Involve family in performance of ADLs  - Assess for home care needs following discharge   - Consider OT consult to assist with ADL evaluation and planning for discharge  - Provide patient education as appropriate  Outcome: Progressing  Goal: Maintains/Returns to pre admission functional level  Description: INTERVENTIONS:  - Perform BMAT or MOVE assessment daily    - Set and communicate daily mobility goal to care team and patient/family/caregiver  - Collaborate with rehabilitation services on mobility goals if consulted  - Perform Range of Motion  times a day  - Reposition patient every  hours    - Dangle patient   times a day  - Stand patient  times a day  - Ambulate patient  times a day  - Out of bed to chair  times a day   - Out of bed for meals  times a day  - Out of bed for toileting  - Record patient progress and toleration of activity level   Outcome: Progressing     Problem: Potential for Falls  Goal: Patient will remain free of falls  Description: INTERVENTIONS:  - Educate patient/family on patient safety including physical limitations  - Instruct patient to call for assistance with activity   - Consult OT/PT to assist with strengthening/mobility   - Keep Call bell within reach  - Keep bed low and locked with side rails adjusted as appropriate  - Keep care items and personal belongings within reach  - Initiate and maintain comfort rounds  - Make Fall Risk Sign visible to staff  - Offer Toileting every  Hours, in advance of need  - Initiate/Maintain alarm  - Obtain necessary fall risk management equipment  - Apply yellow socks and bracelet for high fall risk patients  - Consider moving patient to room near nurses station  Outcome: Progressing     Problem: Prexisting or High Potential for Compromised Skin Integrity  Goal: Skin integrity is maintained or improved  Description: INTERVENTIONS:  - Identify patients at risk for skin breakdown  - Assess and monitor skin integrity  - Assess and monitor nutrition and hydration status  - Monitor labs   - Assess for incontinence   - Turn and reposition patient  - Assist with mobility/ambulation  - Relieve pressure over bony prominences  - Avoid friction and shearing  - Provide appropriate hygiene as needed including keeping skin clean and dry  - Evaluate need for skin moisturizer/barrier cream  - Collaborate with interdisciplinary team   - Patient/family teaching  - Consider wound care consult   Outcome: Progressing     Problem: RESPIRATORY - ADULT  Goal: Achieves optimal ventilation and oxygenation  Description: INTERVENTIONS:  - Assess for changes in respiratory status  - Assess for changes in mentation and behavior  - Position to facilitate oxygenation and minimize respiratory effort  - Oxygen administered by appropriate delivery if ordered  - Initiate smoking cessation education as indicated  - Encourage broncho-pulmonary hygiene including cough, deep breathe, Incentive Spirometry  - Assess the need for suctioning and aspirate as needed  - Assess and instruct to report SOB or any respiratory difficulty  - Respiratory Therapy support as indicated  Outcome: Progressing     Problem: Nutrition/Hydration-ADULT  Goal: Nutrient/Hydration intake appropriate for improving, restoring or maintaining nutritional needs  Description: Monitor and assess patient's nutrition/hydration status for malnutrition  Collaborate with interdisciplinary team and initiate plan and interventions as ordered  Monitor patient's weight and dietary intake as ordered or per policy  Utilize nutrition screening tool and intervene as necessary  Determine patient's food preferences and provide high-protein, high-caloric foods as appropriate       INTERVENTIONS:  - Monitor oral intake, urinary output, labs, and treatment plans  - Assess nutrition and hydration status and recommend course of action  - Evaluate amount of meals eaten  - Assist patient with eating if necessary   - Allow adequate time for meals  - Recommend/ encourage appropriate diets, oral nutritional supplements, and vitamin/mineral supplements  - Order, calculate, and assess calorie counts as needed  - Recommend, monitor, and adjust tube feedings and TPN/PPN based on assessed needs  - Assess need for intravenous fluids  - Provide specific nutrition/hydration education as appropriate  - Include patient/family/caregiver in decisions related to nutrition  Outcome: Progressing

## 2022-08-30 NOTE — ASSESSMENT & PLAN NOTE
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

## 2022-08-30 NOTE — DISCHARGE SUMMARY
[facility]  Discharge- Godfrey Prophet 1965, 65 y o  female MRN: [id number]  Unit/Bed#: S -01 Encounter: [id number]  Primary Care Provider: Rosaura Solis MD   Date and time admitted to hospital: 2022  9:34 AM    * Acute Respiratory insufficiency on chronic hypoxic respiratory failure  Assessment & Plan  Patient presented from rehab with acute onset respiratory distress and hypoxia  Symptoms are resolving and patient reports feeling back at baseline  Patient was recently discharged from hospital to rehab on 2022 following admission for right-sided empyema with initial seal chest tube management subsequently requiring VATS decortication on 2022  Tracheostomy in place from prior admissions  No increase in secretions  On 8 L of oxygen today through supplemental trach collar  CT scan of the chest demonstrating bilateral consolidations which may represent infection  Family reports that patient requires 10 L of oxygen therapy at home  Per Care Management patient was originally set up with oxygen supplementation back in February with 10 L via trach collar but script   Plan:  Continue oxygen therapy through tracheostomy collar    Currently 8 L and 95% on room air below baseline requirements  remdesivir and dexamethasone complete  Continue with ipratropium and levalbuterol following discharge to [address] [place] [address]   per pulmonology; currently below regular O2 requirements, no need for antibiotics secondary to clinical improvement, continue with heparin and Decadron until discharge, completed remdesivir, continue with nebulizers following discharge    Chronic heart failure with preserved ejection fraction [facility])  Assessment & Plan  Wt Readings from Last 3 Encounters:   22 85 kg (187 lb 6 3 oz)   22 80 8 kg (178 lb 3 2 oz)   22 80 8 kg (178 lb 3 2 oz)       Recent admission to [facility] in July for CHF exacerbation  Patient was on Bumex 2 mg daily and Aldactone 100 mg daily  Following discharge following VATS decortication, Bumex was discontinued on August 13 and Aldactone was decreased to 50 mg daily  Patient was not placed on any diuretics following discharge from hospitalization  Chest x-ray for this admission demonstrates moderate pulmonary vascular congestion and small right pleural effusion  Most recent echocardiogram was in February of 2022 which showed EF of 50% with normal diastolic function  Recent BNP this admission of 8400    Plan:  Cardiology on board, following recommendations  Furosemide 40 mg b i d  Continue to monitor patient for signs of worsening heart failure  Monitor weights, ins and outs, serum creatinine electrolytes      COVID-19  Assessment & Plan  Tested positive for COVID on May 19, 2022  Test positive again for COVID on August 24, 2022  Patient complains of thick sputum whitish to yellowish in color, no fevers no shortness of breath, requiring oxygen therapy, saturating 95% on 8 L  Symptoms unlikely significantly related to COVID due to resolution of symptoms in a timely manner  Plan:  Continue with mild COVID pathway  Completed remdesivir and dexamethasone  Continue with ipratropium and levalbuterol   Respiratory protocol    DVT (deep venous thrombosis) (HCC)  Assessment & Plan  Was found to have DVT of left soleus vein during this admission, elevated D-dimer to 6 2  DVT is chronic versus subacute nonocclusive  This was in the setting of Eliquis use due to atrial fibrillation  Suspected Eliquis failure      Plan:  Hematology recommending Pradaxa replacement for Eliquis in setting of Eliquis failure   switched  Patient to [facility]   outpatient hematology evaluation in 1-2 months    Chronic kidney disease  Assessment & Plan  Lab Results   Component Value Date    EGFR 58 08/29/2022    EGFR 66 08/28/2022    EGFR 52 08/27/2022    CREATININE 1 06 08/29/2022    CREATININE 0 96 08/28/2022 CREATININE 1 16 08/27/2022     Baseline creatinine between 0 9-1 0 based on chart review  Creatinine was elevated on admission but now back down to baseline  Plan:  Continue to monitor creatinine levels and electrolytes in setting of diuretic therapy      Tracheostomy in place [facility])  Assessment & Plan  History of subglottic stenosis    Plan:  Continue his routine trach care  Outpatient follow-up with pulmonology/ENT    History of Cardiac arrest [facility])  Assessment & Plan  Plan:  Continue with home medications  Monitor on telemetry    Type 2 diabetes mellitus with hyperglycemia [facility])  Assessment & Plan  Lab Results   Component Value Date    HGBA1C 8 1 (H) 08/24/2022       Recent Labs     08/29/22  0723 08/29/22  1057 08/29/22  1544 08/29/22  2050   POCGLU 201* 224* 216* 332*       Blood Sugar Average: Last 72 hrs:  (P) 235 5     Hemoglobin A1c of 8 1% demonstrating uncontrolled diabetes  Plan:  Currently taking Lantus 30 units  Pre meal insulin 10 units t i d   Sliding scale insulin  Hypoglycemic protocol  Accu-Cheks and correctional scale insulin      Hypertension  Assessment & Plan  History of hypertension  Elevated blood pressures today 188/100  Elevated pressures most likely related to steroids      Plan:  Start on amlodipine 5 mg daily hold if systolic blood pressures less than 110    Medical Problems             Resolved Problems  Date Reviewed: 8/30/2022   None               Discharging Resident: Maye Salter MD  Discharging Attending: Lewis Muhammad MD  PCP: Felicitas Talley MD  Admission Date:   Admission Orders (From admission, onward)     Ordered        08/24/22 Luige Slim 10  Once                      Discharge Date: 08/30/22    Consultations During Hospital Stay:  · Pulmonology, Hematology, speech pathology,     Procedures Performed:   · Doppler venous ultrasound of bilateral legs    Significant Findings / Test Results:   · DVT of left soleus vein nonocclusive, COVID positive    Incidental Findings:   · n/a    Test Results Pending at Discharge (will require follow up):  · n/a     Outpatient Tests Requested:  · n/a    Complications:  Episode of clogging of tracheostomy requiring suction    Reason for Admission:  Acute on chronic hypoxic respiratory failure, COVID positive    Hospital Course:   Godfrey Gupta is a 65 y o  female patient who originally presented to the hospital on 8/24/2022 due to fatigue and worsening shortness of breath and acute on chronic hypoxic respiratory failure with oxygen saturations in the low 80s  Prior to admission patient is status post empyema with the ats decortication completed on August 6, 2022  Her rehabilitation was completed on August 20, 2022  In the emergency room patient received albuterol and ipratropium him with some improvement  She was found to be COVID positive (this is her 2nd COVID infection, recent COVID infection in May of 2022), and was subsequently treated with remdesivir, heparin, and Decadron while in the hospital   Workup demonstrated an elevated D-dimer and a subsequent DVT workup was pursued  She was found to have a DVT in the left soleus vein while on Eliquis therapy confirmed with Doppler evaluation  Hematology was consulted and recommended that patient switches to Pradaxa/dabigatran anticoagulation therapy  Hematology recommended outpatient follow-up in the next 1-2 months to review case discussed anticoagulation recommendations moving forward  Throughout hospital stay patient was given diuretic therapy to optimize volume status in the setting of elevated BNP and pulmonary congestion identified on chest x-ray  Patient was maintained on 8 L of O2 via tracheostomy supplementation throughout the majority of her stay with return to baseline    Patient complained of mild back pain chronic in nature and mild pain at the incision site of right inferior axillary region where chest tube management was pursued during previous hospitalizations  Patient also had elevated blood pressures during the latter part of admission most likely secondary to steroid therapy  Blood pressure was managed with amlodipine 5 mg daily  Patient now stable for discharge  Please see above list of diagnoses and related plan for additional information  Condition at Discharge: fair    Discharge Day Visit / Exam:   Subjective: Kelli Red is a 79-year-old female with past medical history of Afib on Eliquis, subglottic stenosis with tracheostomy, chronic heart failure with preserved ejection fraction, chronic kidney disease, history of cardiac arrest, hypertension, type 2 diabetes, back pain who is being managed in the hospital for acute on chronic hypoxic respiratory failure, COVID, DVT of the left soleus vein, heart failure, and tracheostomy   DVT of the left soleus vein was found despite on chronic Eliquis therapy   Patient is status post empyema and VATS decortication on August 6, 2022  She completed rehab on August 20, 2022       There were no overnight events  This morning patient reports breathing is the same as days prior, she still has some shortness of breath but it is not getting worse  She reports that yesterday her tracheostomy became plugged which required suctioning and instigated anxiety, fear, shortness of breath  She reports she still has pain on the right side of her chest wall secondary to the insertion site of the the VATS decortication  Patient reports she had a bowel movement this past Sunday  She denies chest pain, nausea, vomiting, abdominal pain, hematuria, blood in stool  Vitals: Blood Pressure: 128/70 (08/30/22 1513)  Pulse: 59 (08/30/22 1513)  Temperature: 97 8 °F (36 6 °C) (08/30/22 0833)  Temp Source: Oral (08/29/22 2054)  Respirations: 18 (08/30/22 1513)  Weight - Scale: 85 kg (187 lb 6 3 oz) (08/30/22 0600)  SpO2: 98 % (08/30/22 1513)  Exam:   Physical Exam  Vitals and nursing note reviewed  Constitutional:       General: She is not in acute distress  Appearance: She is well-developed  HENT:      Head: Normocephalic and atraumatic  Eyes:      Conjunctiva/sclera: Conjunctivae normal    Cardiovascular:      Rate and Rhythm: Normal rate and regular rhythm  Heart sounds: No murmur heard  Pulmonary:      Effort: Pulmonary effort is normal  No respiratory distress  Breath sounds: Rhonchi present  Comments: Significant expiratory and inspiratory rhonchi, reduced lung sounds at the bases bilaterally  Abdominal:      Palpations: Abdomen is soft  Tenderness: There is no abdominal tenderness  Musculoskeletal:      Cervical back: Neck supple  Right lower leg: No edema  Left lower leg: No edema  Skin:     General: Skin is warm and dry  Neurological:      Mental Status: She is alert and oriented to person, place, and time  Discussion with Family: I did not attempt to update family today  Discharge instructions/Information to patient and family:   See after visit summary for information provided to patient and family  Provisions for Follow-Up Care:  See after visit summary for information related to follow-up care and any pertinent home health orders  Disposition:   [facility] [address]    Planned Readmission: n/a    Discharge Medications:  See after visit summary for reconciled discharge medications provided to patient and/or family        **Please Note: This note may have been constructed using a voice recognition system**

## 2022-08-30 NOTE — CASE MANAGEMENT
Case Management Progress Note    Patient name Pete Cervantes  Location S /S -01 MRN [id number]  : 1965 Date 2022       LOS (days): 6  Geometric Mean LOS (GMLOS) (days): 5 40  Days to GMLOS:-0 7        OBJECTIVE:        Current admission status: Inpatient  Preferred Pharmacy:   [address], [address] [facility], [address]  [address] [facility]  [address]  Phone: [phone] Fax: [address], [address] [facility] [address]   Karli Martinez  [facility] [address]  Phone: [phone] Fax: [phone]    [place] [address] #[id number] - [address], [facility] [address], [place] [address] [facility] [address]  Phone: [phone] Fax: [phone] [place] [address] [facility] [address] [address]  Phone: [phone] Fax: [phone]    Primary Care Provider: Joey Paz MD    Primary Insurance: Yolis FAROOQ  Secondary Insurance: Toan Galdamez    PROGRESS NOTE:    Spoke with Katie Crowe at [facility]  Reports that patient was admitted from their facility and is able to return  Aware that patient had been on o2 at 10L and reports that they would want it to be less than that prior to her returning  Spoke with RN who states patient has been stable on 8L this AM  Spoke with MD during rounds who confirmed ability for patient to return to [facility] today  TT with Katie Crowe who reports they can accept her back, but will need to get a red room for her due to +COVID diagnosis  Will work on transportation once bed confirmed

## 2022-08-30 NOTE — ASSESSMENT & PLAN NOTE
Patient presented from rehab with acute onset respiratory distress and hypoxia  Symptoms are resolving and patient reports feeling back at baseline  Patient was recently discharged from hospital to rehab on 2022 following admission for right-sided empyema with initial seal chest tube management subsequently requiring VATS decortication on 2022  Tracheostomy in place from prior admissions  No increase in secretions  On 8 L of oxygen today through supplemental trach collar  CT scan of the chest demonstrating bilateral consolidations which may represent infection  Family reports that patient requires 10 L of oxygen therapy at home  Per Care Management patient was originally set up with oxygen supplementation back in February with 10 L via trach collar but script   Plan:  Continue oxygen therapy through tracheostomy collar    Currently 8 L and 95% on room air below baseline requirements  remdesivir and dexamethasone complete  Continue with ipratropium and levalbuterol following discharge to [address] [place] [address]   per pulmonology; currently below regular O2 requirements, no need for antibiotics secondary to clinical improvement, continue with heparin and Decadron until discharge, completed remdesivir, continue with nebulizers following discharge

## 2022-08-30 NOTE — PROGRESS NOTES
Progress Note - Pulmonary   Godfrey Prophet 65 y o  female MRN: [id number]  Unit/Bed#: S -01 Encounter: [id number]    Assessment/Plan:    1  Acute on chronic hypoxic respiratory failure likely secondary to COVID-19       -  currently on 8 L-trach mask, recent home O2 requirements 10 L       -  maintain saturations greater than 88%       -  pulmonary toilet:  Deep breathing cough, OOB as tolerated, IS Q 1 hr, suction as needed, clean trach as needed    2  COVID-19- 8/24/2022        -  8/29/2022- completed 5 days of remdesivir        -  Day # 6- Decadron- clear to discontinue steroids upon discharge        -  D-dimer-- 6 23- Brilinta        -  procalcitonin 1 00-- 0 77        -  CRP- 31-- 36 1-- 25 6        -  monitor off antibiotics    3  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       -  11/2022- tracheostomy placed       -  Maintained 6 Shiley uncuffed       -  continue trach care and suction as needed       -  trach changes per ENT    4   Suspected COPD of unknown severity without acute exacerbation        -  Inpatient:  Xopenex/Atrovent t i d         -  home regimen:  Albuterol nebulizer q 6h p r n         -  steroids as above    5  Mild CHANTALE       -  follows with Dr Corey Fletcher       -  given recent continues pneumothorax along with VATS would recommend following up with Dr Kenney Hector for recommendations on resuming BiPAP    6  Chronic diastolic CHF w/ monitor PHTN likely WHO group II & III and PAF       -  2/2022-  EF 50%    PA pressure 54 mmHg       -  patient overall appears euvolemic       -  continue Lasix 40 mg b i d         - outpatient follow-up per discharge instructions  - pulmonary will sign off      Chief Complaint:    " Want to go home"    Subjective:    Kelli was comfortably sitting in her bed  She reports she likely be discharged today  No significant overnight events reported    Patient currently denying any fevers, chills hemoptysis, headaches night sweats, pleuritic chest pain, or palpitations  Objective:    Vitals: Blood pressure 168/93, pulse 56, temperature 98 2 °F (36 8 °C), temperature source Oral, resp  rate 19, weight 85 kg (187 lb 6 3 oz), SpO2 96 %  8L,Body mass index is 27 67 kg/m²  Intake/Output Summary (Last 24 hours) at 8/30/2022 0834  Last data filed at 8/30/2022 0600  Gross per 24 hour   Intake 0 ml   Output 1950 ml   Net -1950 ml       Invasive Devices  Report    Peripheral Intravenous Line  Duration           Peripheral IV 08/27/22 Distal;Left;Ventral (anterior) Forearm 2 days          Airway  Duration           Surgical Airway Shiley Fenestrated 181 days                Physical Exam:   Physical Exam  Constitutional:       General: She is not in acute distress  Appearance: Normal appearance  She is normal weight  She is not ill-appearing  HENT:      Head: Normocephalic and atraumatic  Nose: Nose normal  No congestion or rhinorrhea  Mouth/Throat:      Mouth: Mucous membranes are dry  Pharynx: No oropharyngeal exudate or posterior oropharyngeal erythema  Cardiovascular:      Rate and Rhythm: Normal rate and regular rhythm  Pulses: Normal pulses  Heart sounds: Normal heart sounds  No murmur heard  No friction rub  No gallop  Pulmonary:      Effort: Pulmonary effort is normal  No tachypnea, bradypnea, accessory muscle usage or respiratory distress  Breath sounds: Decreased air movement present  No stridor  Decreased breath sounds and rhonchi present  No wheezing or rales  Comments: scttered rhonchi throughout lung fields  Chest:      Chest wall: No tenderness  Abdominal:      General: Abdomen is flat  Bowel sounds are normal  There is no distension  Palpations: Abdomen is soft  There is no mass  Musculoskeletal:         General: No swelling or tenderness  Normal range of motion  Cervical back: Normal range of motion  No rigidity or tenderness  Skin:     General: Skin is warm and dry        Coloration: Skin is not jaundiced or pale  Neurological:      General: No focal deficit present  Mental Status: She is alert and oriented to person, place, and time  Mental status is at baseline  Psychiatric:         Mood and Affect: Mood normal          Behavior: Behavior normal          Labs:  I have personally reviewed pertinent lab results 8/29/2022    Imaging and other studies: I have personally reviewed pertinent films in PACS     CXR 8/26/2022- cardiomegaly vascular congestion

## 2022-08-30 NOTE — CASE MANAGEMENT
Case Management Progress Note    Patient name Linda Fam  Location S /S -01 MRN [id number]  : 1965 Date 2022       LOS (days): 6  Geometric Mean LOS (GMLOS) (days): 5 40  Days to GMLOS:-0 8        OBJECTIVE:        Current admission status: Inpatient  Preferred Pharmacy:   [address], [address] [facility], [address]  [address] [facility]  [address]  Phone: [phone] Fax: [address], [address] [facility] [address]  [address]  [facility] [address]  Phone: [phone] Fax: [phone]    [facility] #[id number] - [address], [facility] [address] [place], [place] [address], [address] [facility] [address]  Phone: [phone] Fax: [phone] [facility], [facility] [address] [place] [address] [facility] [address] [place] [address] [address] [facility] [address] [address]  Phone: [phone] Fax: [phone]    Primary Care Provider: Daina Coyne MD    Primary Insurance: Hamlin Forte REP  Secondary Insurance: [address] NOTE:    TT received from [facility]  Reports that she was just informed that patient may require new authorization prior to returning; had previously believed she was a bed hold  States that she's awaiting update from business office as to whether or not they will accept auth pending; will hopefully be in touch within the hour  Informed that transport arranged for 8:00pm and will remain in place unless notified to cancel   Will awaiting follow-up re: confirmation of transport for today vs need to hold for insurance auth until AM

## 2022-08-30 NOTE — ASSESSMENT & PLAN NOTE
Lab Results   Component Value Date    HGBA1C 8 1 (H) 08/24/2022       Recent Labs     08/29/22  0723 08/29/22  1057 08/29/22  1544 08/29/22 2050   POCGLU 201* 224* 216* 332*       Blood Sugar Average: Last 72 hrs:  (P) 235 5     Hemoglobin A1c of 8 1% demonstrating uncontrolled diabetes  Plan:  Currently taking Lantus 30 units    Pre meal insulin 10 units t i d   Sliding scale insulin  Hypoglycemic protocol  Accu-Cheks and correctional scale insulin

## 2022-08-31 ENCOUNTER — TRANSITIONAL CARE MANAGEMENT (OUTPATIENT)
Dept: FAMILY MEDICINE CLINIC | Facility: CLINIC | Age: 57
End: 2022-08-31

## 2022-08-31 ENCOUNTER — NURSING HOME VISIT (OUTPATIENT)
Dept: WOUND CARE | Facility: HOSPITAL | Age: 57
End: 2022-08-31
Payer: COMMERCIAL

## 2022-08-31 DIAGNOSIS — Z71.89 COMPLEX CARE COORDINATION: Primary | ICD-10-CM

## 2022-08-31 DIAGNOSIS — L89.153 PRESSURE INJURY OF SACRAL REGION, STAGE 3 (HCC): Primary | ICD-10-CM

## 2022-08-31 DIAGNOSIS — R26.2 AMBULATORY DYSFUNCTION: ICD-10-CM

## 2022-08-31 PROCEDURE — 99305 1ST NF CARE MODERATE MDM 35: CPT | Performed by: NURSE PRACTITIONER

## 2022-08-31 NOTE — ASSESSMENT & PLAN NOTE
S[place]  -started as unstageable pressure ulcer, onset is prior to admission to skilled nursing facility  - wound bed is 100% granulation, with no obvious sign of infection  - local wound care with Z guard  - continued offload  - followup next week

## 2022-08-31 NOTE — UTILIZATION REVIEW
Notification of Discharge   This is a Notification of Discharge from our facility [address]  Please be advised that this patient has been discharge from our facility  Below you will find the admission and discharge date and time including the patients disposition  UTILIZATION REVIEW CONTACT:  Ana Balderrama MA  Utilization   Network Utilization Review Department  Phone: [phone] x carefully listen to the prompts  All voicemails are confidential   Email: [email or web address]     PHYSICIAN ADVISORY SERVICES:  FOR [id number] REVIEW - MEDICAL NECESSITY DENIAL  Phone: [phone]  Fax: [phone]  Email: [email or web address]     PRESENTATION DATE: 8/24/2022  9:34 AM  OBERVATION ADMISSION DATE:   INPATIENT ADMISSION DATE: 8/24/22  2:09 PM   DISCHARGE DATE: 8/30/2022  6:57 PM  DISPOSITION: Non [facility] SN[facility]/TCU/SNU Non [facility] SNF/TCU/SNU      IMPORTANT INFORMATION:  Send all requests for admission clinical reviews, approved or denied determinations and any other requests to dedicated fax number below belonging to the campus where the patient is receiving treatment   List of dedicated fax numbers:  [address] DENIALS (Administrative/Medical Necessity) [phone]   [address] (Maternity/NICU/Pediatrics) [phone]   [place] [phone]   [address] [place] [address] [phone]   [address] [place] [address] [phone]   [address] [place] [address],[address] [phone]   [facility]  [phone]   [address] [place] [address], [facility]  [address] [facility] [address] [facility] [phone]

## 2022-08-31 NOTE — TELEPHONE ENCOUNTER
S/w Jeremiah Billings from [facility] stated patient is a new admission and needs to review orders with on call

## 2022-08-31 NOTE — PROGRESS NOTES
[address] [facility]   [facility]       Patient ID: Belén Ortiz is a 65 y o  female Date of Birth 1965     Location of Service: [address] [place] [address]    Performed wound round with: Wound team     Chief Complaint : Sacrum    Wound Instructions:  Wound:  Sacrum  Discontinue previous wound order  Cleanse the wound bed with NSS / wound cleanser  Apply non-sting skin prep to periwound area  Apply zguard to wound bed  Frequency : twice a day and prn for soiling  Offload all wounds  Turn and reposition frequently, maximum of every two hours  Instruct / Assist with weight shifting every 15 - 20 minutes when in chair  Increase protein intake  Monitor for any sign of infection or worsening, inform PCP or patient's primary physician in your facility  Allergies  Metformin and Clonidine      Assessment & Plan:  1  Pressure injury of sacral region, stage 3 (HCC)  Assessment & Plan:  Sacrum  -started as unstageable pressure ulcer, onset is prior to admission to skilled nursing facility  - wound bed is 100% granulation, with no obvious sign of infection  - local wound care with Z guard  - continued offload  - followup next week      2  Ambulatory dysfunction  Assessment & Plan:  On STR             Subjective:   8/31/2022This is a 65 y o , female referred to our service for wound/ skin alterations on sacrum  Patient have a complex medical history including but not limited to  Aortic aneurysm ([address] ), Arthritis, Depression, Diabetes mellitus ([address] ), Fibromyalgia, GERD (gastroesophageal reflux disease), GERD (gastroesophageal reflux disease), H/O cardiovascular stress test, H/O echocardiogram, Hyperlipidemia, Hypertension, Migraines, Psychiatric disorder, and Uncontrolled hypertension    Patient was referred by Senior Care Team  Patient was seen in collaboration with the facility wound team      Wound History:  As per medical Record review, patient was seen for wound consult at  inpatient acute care on August 25, 2022, the wound was diagnosed as on unstageable pressure ulcer  Previous treatment is Triad paste  Received patient in bed, seems comfortable  Patient is incontinent of both bowel in bladder  Patient have a good appetite  Review of Systems   Constitutional: Negative  HENT: Negative  Eyes: Negative  Respiratory: Negative  Cardiovascular: Negative for chest pain and leg swelling  Gastrointestinal: Negative  Endocrine: Negative  Genitourinary: Negative  Musculoskeletal: Positive for gait problem  Skin: Positive for wound  See HPI   Neurological: Negative for dizziness and headaches  Psychiatric/Behavioral: Negative for behavioral problems  Objective:    Physical Exam  Constitutional:       Appearance: She is obese  HENT:      Head: Normocephalic and atraumatic  Nose: Nose normal       Mouth/Throat:      Pharynx: Oropharynx is clear  Eyes:      Conjunctiva/sclera: Conjunctivae normal    Cardiovascular:      Rate and Rhythm: Normal rate  Pulmonary:      Effort: Pulmonary effort is normal    Abdominal:      Tenderness: There is no abdominal tenderness  There is no guarding  Genitourinary:     Comments: incontinent  Musculoskeletal:         General: No tenderness  Cervical back: Normal range of motion  Right lower leg: No edema  Left lower leg: No edema  Comments: LROM   Skin:     General: Skin is warm  Findings: Lesion present  Comments: Sacrum - wound bed is 100% granulation, periwound is normal, no drainage, no obvious sign of infection, wound size is 0 93 x 0 86 x 0 1 cm  , denies pain   Neurological:      Mental Status: She is alert        Gait: Gait abnormal    Psychiatric:         Mood and Affect: Mood normal          Behavior: Behavior normal               Procedures     Results from last 6 Months   Lab Units 07/28/22  1201   WOUND CULTURE  2+ Growth of Methicillin Resistant Staphylococcus aureus* Patient's care was coordinated with nursing facility staff  Recent vitals, labs and updated medications were reviewed on EMR or chart system of facility  Past Medical, surgical, social, medication and allergy history and patient's previous records were reviewed and updated as appropriate: Most up-to date information is available in the facility EMR where the patient is currently admitted      Patient Active Problem List   Diagnosis    Anxiety    Cardiac murmur    Carpal tunnel syndrome of left wrist    Change in bowel habit    Chronic pain disorder    Cough    Depression[facility] recurrent [facility]    Retinal hemorrhage due to secondary diabetes ([facility]    Diabetic peripheral neuropathy (HCC)    Dizziness    Fibromyalgia    Gastroesophageal reflux disease with esophagitis    Hemangioma of bone    Hyperlipidemia associated with type 2 diabetes mellitus ([facility] )    Hypertension    Hypoestrogenism    Low back pain    Lumbar radiculopathy    Medically complex patient    Neuropathy    Non compliance with medical treatment    Noncompliance with diet and medication regimen    Overweight    Primary insomnia    Right-sided thoracic back pain    Sciatica of left side    Screening for colon cancer    Shingles    Thoracic radiculitis    Tobacco abuse    Type 2 diabetes mellitus with hyperglycemia (HCC)    Vertebral body hemangioma    Vertigo    Vaginal discharge    Yeast vaginitis    Recurrent vaginitis    Hypokalemia    Abnormal urinalysis    Thoracic aortic aneurysm without rupture (HCC)    Epigastric pain    Urinary tract infection with hematuria    Bacterial vaginosis    Abscess    Palpitations    Nasal vestibulitis    Orthopnea    CHANTALE (obstructive sleep apnea)    Diarrhea    Trigger finger, right middle finger    Trigger finger, right ring finger    Hypoglycemia    Polypharmacy    Cellulitis of left lower extremity    MODESTO (acute kidney injury) ([facility] )    History of Ventricular tachycardia ([address] )    A-fib ([address] [facility] )    History of Cardiac arrest ([address] [facility] )    Cerebral vascular accident ([address] )    Cerebral aneurysm    Urinary retention    CAD (coronary artery disease)    Subglottic stenosis    Tracheostomy in place [facility])    Chronic hypoxemic respiratory failure (HCC)    Insomnia secondary to anxiety    Elevated troponin    Anemia    COVID-19    Shortness of breath    Prolonged Q-T interval on ECG    Thoracic aortic aneurysm, ruptured (HCC)    Hypomagnesemia    Chest pain    Moderate protein-calorie malnutrition (HCC)    Hemoptysis    Hyponatremia    Chronic kidney disease    Leukocytosis    Acute Respiratory insufficiency on chronic hypoxic respiratory failure    Pain at Chest tube insertion site   Empyema lung, Right    Chest wall wound infection    Chronic heart failure with preserved ejection fraction (HCC)    D[facility] thrombosis) ([facility]    Pressure injury of sacral region, stage 3 ([facility])    Ambulatory dysfunction     Past Medical History:   Diagnosis Date    Anxiety     Aortic aneurysm (HCC)     Arthritis     Depression     Diabetes mellitus (HCC)     Fibromyalgia     GERD (gastroesophageal reflux disease)     GERD (gastroesophageal reflux disease)     H/O cardiovascular stress test 09/2018    no ischemia  EF 70%   H/O echocardiogram 01/2019    EF 60%  Mild LVH  trivial effusion   Hyperlipidemia     Hypertension     Migraines     Psychiatric disorder     anxiety    Uncontrolled hypertension 2/25/2015    Last Assessment & Plan:  BP today above goal <140/90, apparently asymptomatic  Prior BP elevations were attributed to not taking medication  Consider increased medication if persistent on f/u       Past Surgical History:   Procedure Laterality Date    BACK SURGERY      Lumbar epidural steroid injection    CARDIAC CATHETERIZATION N/A 10/22/2021    Procedure: Cardiac pci;  Surgeon: Olga Aldana MD;  [facility]; Service: Cardiology    CARDIAC CATHETERIZATION  10/22/2021    Procedure: Cardiac catheterization;  Surgeon: Cat Mike MD;  [facility]; Service: Cardiology    CARDIAC CATHETERIZATION Left 10/27/2021    Procedure: Cardiac Left Heart Cath;  Surgeon: Jocelyne Gatica MD;  [facility]; Service: Cardiology    CARDIAC CATHETERIZATION N/A 10/27/2021    Procedure: Cardiac pci;  Surgeon: Jocelyne Gatica MD;  [facility]; Service: Cardiology    CARDIAC CATHETERIZATION N/A 10/28/2021    Procedure: Cardiac pci;  Surgeon: Oneil Tim DO;  [facility];   Service: Cardiology    CARPAL TUNNEL RELEASE Left      SECTION      CHOLECYSTECTOMY      COLONOSCOPY      incomplete    COLONOSCOPY      EYE SURGERY      HYSTERECTOMY      Total    IR CHEST TUBE PLACEMENT  2022    IR CHEST TUBE PLACEMENT  2022    IR CHEST TUBE PLACEMENT  2022    LUNG DECORTICATION Right 2022    Procedure: DECORTICATION LUNG;  Surgeon: Abi Nova MD;  [facility];  Service: Thoracic    OVARIAN CYST REMOVAL      PEG W/TRACHEOSTOMY PLACEMENT N/A 2021    Procedure: TRACHEOSTOMY WITH INSERTION PEG TUBE;  Surgeon: Mariajose Fung DO;  [facility];  Service: General    THORACOSCOPY VIDEO ASSISTED SURGERY (VATS) Right 2022    Procedure: THORACOSCOPY VIDEO ASSISTED SURGERY (VATS), RIGHT;  Surgeon: Abi Nova MD;  [facility];  Service: Thoracic    TUBAL LIGATION      UPPER GASTROINTESTINAL ENDOSCOPY       Social History     Socioeconomic History    Marital status: Legally      Spouse name: None    Number of children: None    Years of education: None    Highest education level: None   Occupational History    None   Tobacco Use    Smoking status: Former Smoker     Packs/day: 1 00     Years: 30 00     Pack years: 30 00     Types: Cigarettes    Smokeless tobacco: Never Used Vaping Use    Vaping Use: Never used   Substance and Sexual Activity    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   Drug use: Yes     Types: Marijuana     Comment: medical; seldom use    Sexual activity: Yes     Birth control/protection: Female Sterilization     Comment: Monogamous relationship with monogamous partner   Other Topics Concern    None   Social History Narrative    Most recent tobacco use screenin2019    Do you currently or have you served in the [employer]: No    Were you activated, into active duty, as a member of the [employer] or as a Reservist: No    Advance directive: No    Chewing tobacco: none    Alcohol intake: None    Marital status: Single    Live alone or with others: with others    Family history of heart disease:    aortic aneurysm    High cholesterol: Yes    High blood pressure: Yes    Exercise level: Heavy    Overweight: Yes    Obese: Yes    Diabetes: Yes    General stress level: High    Diet: Regular    Caffeine intake: Occasional    Guns present in home: No    Seat belts used routinely: Yes    Sexual orientation: Heterosexual    Sunscreen used routinely: No    Seat belt/car seat used routinely: Yes    Exercise-How often do you exercise: as tolerated    Do you have regular medical check ups: Yes    Do you have regular dental check ups: Yes    Illicit drugs-years of use:    recovery 23 years - HX of     Social Determinants of Health     Financial Resource Strain: Not on file   Food Insecurity: Food Insecurity Present    Worried About Running Out of Food in the Last Year: Sometimes true    Lela of Food in the Last Year: Sometimes true   Transportation Needs: No Transportation Needs    Lack of Transportation (Medical): No    Lack of Transportation (Non-Medical):  No   Physical Activity: Not on file   Stress: Not on file   Social Connections: Not on file   Intimate Partner Violence: Not on file   Housing Stability: High Risk    Unable to Pay for Housing in the Last Year: No    Number of Places Lived in the Last Year: 1    Unstable Housing in the Last Year: Yes        Current Outpatient Medications:     escitalopram (LEXAPRO) 10 mg tablet, TAKE 1 TABLET BY MOUTH EVERY DAY, Disp: 90 tablet, Rfl: 1    acetaminophen (TYLENOL) 325 mg tablet, Take 3 tablets (975 mg total) by mouth every 8 (eight) hours, Disp: 30 tablet, Rfl: 0    albuterol (2 5 mg/3 mL) 0 083 % nebulizer solution, Take 3 mL (2 5 mg total) by nebulization every 4 (four) hours as needed for wheezing or shortness of breath, Disp: 180 mL, Rfl: 5    amiodarone 100 mg tablet, TAKE 1 TABLET BY MOUTH DAILY WITH BREAKFAST , Disp: 90 tablet, Rfl: 0    amLODIPine (NORVASC) 5 mg tablet, Take 1 tablet (5 mg total) by mouth daily, Disp: , Rfl: 0    atorvastatin (LIPITOR) 40 mg tablet, Take 1 tablet (40 mg total) by mouth daily with dinner, Disp: 30 tablet, Rfl: 0    benzonatate (TESSALON PERLES) 100 mg capsule, Take 1 capsule (100 mg total) by mouth 3 (three) times a day as needed for cough, Disp: 20 capsule, Rfl: 0    Blood Pressure Monitoring (Sphygmomanometer) MISC, Use 2 (two) times a day (Patient not taking: No sig reported), Disp: 1 each, Rfl: 0    dabigatran etexilate (PRADAXA) 150 mg capsu, Take 1 capsule (150 mg total) by mouth every 12 (twelve) hours, Disp: , Rfl: 0    ferrous sulfate 325 (65 Fe) mg tablet, Take 1 tablet (325 mg total) by mouth daily with breakfast, Disp: 30 tablet, Rfl: 0    furosemide (LASIX) 40 mg tablet, Take 1 tablet (40 mg total) by mouth 2 (two) times a day, Disp: , Rfl: 0    hydrOXYzine HCL (ATARAX) 25 mg tablet, Take 1 tablet (25 mg total) by mouth every 6 (six) hours as needed for anxiety, Disp: 30 tablet, Rfl: 0    insulin glargine (LANTUS) 100 units/mL subcutaneous injection, Inject 30 Units under the skin daily at bedtime, Disp: 10 mL, Rfl: 0    insulin lispro (HumaLOG KwikPen) 100 units/mL injection pen, Inject 10 Units under the skin 3 (three) times a day with meals, Disp: 15 mL, Rfl: 0    Insulin Pen Needle 31G X 6 MM MISC, 1 Device by Does not apply route 4 (four) times a day, Disp: , Rfl:     ipratropium (ATROVENT) 0 02 % nebulizer solution, Take 2 5 mL (0 5 mg total) by nebulization 3 (three) times a day, Disp: , Rfl: 0    Lancets MISC, Use 1 Device 4 (four) times a day, Disp: , Rfl:     levalbuterol (XOPENEX) 1 25 mg/0 5 mL nebulizer solution, Take 0 5 mL (1 25 mg total) by nebulization 3 (three) times a day, Disp: , Rfl: 0    lidocaine (LIDODERM) 5 %, Apply 2 patches topically daily Remove & Discard patch within 12 hours or as directed by MD, Disp: 30 patch, Rfl: 0    LORazepam (Ativan) 1 mg tablet, Take 0 5 tablets (0 5 mg total) by mouth every 8 (eight) hours as needed for anxiety or sleep, Disp: 30 tablet, Rfl: 0    metoprolol succinate (TOPROL-XL) 50 mg 24 hr tablet, Take 1 tablet (50 mg total) by mouth every 12 (twelve) hours, Disp: 180 tablet, Rfl: 3    mirtazapine (REMERON) 7 5 MG tablet, Take 1 tablet (7 5 mg total) by mouth daily at bedtime, Disp: 30 tablet, Rfl: 0    Novofine Pen Needle 32G X 6 MM MISC, USE 3 (THREE) TIMES A DAY WITH MEALS, Disp: 100 each, Rfl: 2    pantoprazole (PROTONIX) 40 mg tablet, Take 1 tablet (40 mg total) by mouth daily, Disp: 90 tablet, Rfl: 3    polyethylene glycol (MIRALAX) 17 g packet, Take 17 g by mouth daily (Patient not taking: No sig reported), Disp: , Rfl: 0    pregabalin (LYRICA) 75 mg capsule, TAKE ONE CAPSULE EVERY DAY, Disp: 90 capsule, Rfl: 1    ticagrelor (BRILINTA) 90 MG, Take 1 tablet (90 mg total) by mouth every 12 (twelve) hours, Disp: 30 tablet, Rfl: 0    white petrolatum-mineral oil (EUCERIN,HYDROCERIN) cream, Apply topically 2 (two) times a day, Disp: 113 g, Rfl: 0  No current facility-administered medications for this visit    Family History   Problem Relation Age of Onset    Hypertension Mother    Ethel Franck Arthritis Mother     Diabetes Father     Other Sister         renal cell carcinoma    Diabetes Other     Factor V Leiden deficiency Other               Coordination of Care: Wound team aware of the treatment plan  Facility nurse will provide wound treatment and monitor the wound for any changes  Patient / Staff education : Patient / Staff was given education on sign of infection and pressure ulcer prevention  Patient/ Staff verbalized understanding     Follow up :  Next week    Voice-recognition software may have been used in the preparation of this document  Occasional wrong word or "sound-alike" substitutions may have occurred due to the inherent limitations of voice recognition software  Interpretation should be guided by context        NEELAM Monroe

## 2022-09-01 ENCOUNTER — NURSING HOME VISIT (OUTPATIENT)
Dept: GERIATRICS | Facility: OTHER | Age: 57
End: 2022-09-01
Payer: COMMERCIAL

## 2022-09-01 ENCOUNTER — PATIENT OUTREACH (OUTPATIENT)
Dept: FAMILY MEDICINE CLINIC | Facility: CLINIC | Age: 57
End: 2022-09-01

## 2022-09-01 DIAGNOSIS — J86.9 EMPYEMA LUNG (HCC): ICD-10-CM

## 2022-09-01 DIAGNOSIS — I82.462 DEEP VEIN THROMBOSIS (DVT) OF CALF MUSCLE VEIN OF LEFT LOWER EXTREMITY, UNSPECIFIED CHRONICITY (HCC): ICD-10-CM

## 2022-09-01 DIAGNOSIS — I48.0 PAROXYSMAL ATRIAL FIBRILLATION (HCC): ICD-10-CM

## 2022-09-01 DIAGNOSIS — E11.65 TYPE 2 DIABETES MELLITUS WITH HYPERGLYCEMIA, WITH LONG-TERM CURRENT USE OF INSULIN (HCC): ICD-10-CM

## 2022-09-01 DIAGNOSIS — I50.32 CHRONIC HEART FAILURE WITH PRESERVED EJECTION FRACTION (HCC): ICD-10-CM

## 2022-09-01 DIAGNOSIS — Z79.4 TYPE 2 DIABETES MELLITUS WITH HYPERGLYCEMIA, WITH LONG-TERM CURRENT USE OF INSULIN (HCC): ICD-10-CM

## 2022-09-01 DIAGNOSIS — R06.89 RESPIRATORY INSUFFICIENCY: ICD-10-CM

## 2022-09-01 DIAGNOSIS — U07.1 COVID-19: Primary | ICD-10-CM

## 2022-09-01 DIAGNOSIS — R53.81 PHYSICAL DECONDITIONING: ICD-10-CM

## 2022-09-01 PROCEDURE — 99309 SBSQ NF CARE MODERATE MDM 30: CPT | Performed by: NURSE PRACTITIONER

## 2022-09-01 NOTE — PROGRESS NOTES
[address], [address] [place] [address], [address] [place] [address]  [phone]    NAME: Danyel Zabala  AGE: 65 y o  SEX: female    Progress Note    Location: OO  POS: 32 (SNF)    Assessment/Plan:    COVID-19  Recent PCR COVID-19 test (8/24/2022): Positive  Managed in hospital with mild COVID pathway and remdesivir and dexamethasone  Has history of prior COVID-19 virus infection (May, 2022)  Received 1st COVID-19 vaccine on 8/22/2022  Patient reported improved breathing patterns this visit  Continue room isolation and droplet precaution per facility protocol  Patient with very good meal completion and acknowledged good sleep pattern at HS  Continue the following maintenance meds:  * Albuterol nebulization Q4HR PRN  * Ipratropium nebulization TID  * Levalbuterol nebulization TID  Continue O2 supplementation to keep SPO2 > 92%  Continue daily tracheostomy tube care  Followed by RT while in STR    Empyema lung, Right  S/P VATS procedure (8/6/2022)  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)  Patient reported improving breathing patterns this visit  Continue bronchodilators and PRN supplemental oxygen  Patient has ankle to referral for pulmonology office on March, 2022 but unable to find any pulmonary notes in epic  Recommend ambulatory referral to pulmonology office upon discharge      Acute Respiratory insufficiency on chronic hypoxic respiratory failure  Continue supportive care  RT following while in STR    Chronic heart failure with preserved ejection fraction (HCC)  Wt Readings from Last 3 Encounters:   08/30/22 85 kg (187 lb 6 3 oz)   08/23/22 80 8 kg (178 lb 3 2 oz)   08/19/22 80 8 kg (178 lb 3 2 oz)   Admission wt in OO: 187 5 lbs (9/1/2022)  Patient appears euvolemic on assessment  Continue CHF protocol and weight checks  Continue Furosemide 40mg BID/ Metoprolol Succ ER 50mg BID    Type 2 diabetes mellitus with hyperglycemia (HCC)    Lab Results   Component Value Date    HGBA1C 8 1 (H) 08/24/2022   Goal: < 5 7%  Stable  FBG range (8/31/2022 to 9/1/2022) = 183 to 263  Pre-lunch BG range (8/31/2022 to 9/1/2022) = 172 to 219  Pre-dinner BG range (8/31/2022 to 9/1/2022) = 102 to 215  2100 BG range (8/30/2022 to 9/1/2022) = 207 to 246  Continue Insulin Glagine 30 units at HS/ Insulin Lispro 10 units with meals  Continue Pregabalin 75mg daily (neuropathy)     A-fib (HCC)  Stable and controlled  BP range (8/30/2022 to 9/1/2022) = 114/66 to 152/85  HR range (8/30/2022 to 9/1/2022) = 74 to 88/min  Continue to following meds:  * Amiodarone 100mg daily  * Metoprolol Succ ER 50mg BID  * Ticagrelor 90mg BID (Hx of Ventricular tachycardia)  * Dagabitran 150mg BID (DVT; replaced for Eliquis failure)  * Amlodipine 5mg daily   To follow-up cardiology office upon discharge    Physical deconditioning  Continue PT/OT services    DV[facility] ([facility] )  Site: Left Calf vein (chronic vs  subacute)  Started on Pradaxa while inpatient as replacement for Eliquis  Recommended for Hematology evaluation in 1-2 months      Chief complaint / Reason for visit: Follow-up visit    History of Present Illness: This is a 60-year-old female patient currently admitted O-STR (8/18/2022 to present) following discharge from acute care hospitalization [facility]  [facility]) with Dx of Emphyema, Right lung - S/P VATS procedure (8/6/2022) Acute on Chronic HFpEF, Acute Respiratory insufficiency on chronic hypoxic respiratory failure, Chest wall infection (around chest tube)  Patient sent out to [facility] and eventually admitted on 8/24/2022 for worsening of respiratory symptoms  Patient readmitted to [facility] on 8/30/2022  to continue rehabilitation services  Recent hospitalization discharge Dx: COVID-19 virus infection and Acute Respiratory insufficiency on chronic hypoxic respiratory failure and Left LE DVT (Chronic Vs  Subacute: 8/25/2022)    Patient is in bed sitting upright - alert, cooperative, calm, very pleasant and not in distress  Patient with tracheostomy tube - on O2  Unable to respond verbally but can indicate YES/NO responses and write responses  Patient acknowledged feeling much better this visit - but indicated as well that breathing is still not baseline  Patient denies any acute medical concerns during ROS assessment including pain  Patient acknowledge that she is eating/sleeping well  Per nursing, no acute medical concerns for this visit  Review of Systems:  Per history of present illness, all other systems reviewed and negative  HISTORY:  Medical Hx: Reviewed, unchanged  Family Hx: Reviewed, unchanged  Soc Hx: Reviewed,  unchanged    ALLERGY: Reviewed, unchanged  Allergies   Allergen Reactions    Metformin Diarrhea    Clonidine Rash     Patch         PHYSICAL EXAM:  Vital Signs:  T98 0F -P88 -R20 BP: 133/66 SpO2: 97% RA  Weight: 187 5 lbs (9/1/2022) <= 178 2 lbs (8/18/2022)    General: NAD  Head: Atraumatic  Normocephalic  Eye Exam: anicteric sclera, no discharge, PERRLA, No injection  Oral Exam: moist mucous membranes, no buccaloropharyngeal erythema, palatine tonsils WNL  Neck Exam: no anterior cervical lymphadenopathy noted, neck supple  Cardiovascular: regular rate, regular rhythm, no murmurs, rubs, or gallops  Pulmonary: no wheeze, (+) scattered rhonchi, no rales  No chest tenderness  With Tracheostomy in place  Abdominal: soft, non-tender, nondistended, bowel sounds audible x 4 quadrants  Ave meal completion: 100%  : Non distended bladder  Continent  Last documented BM: 9/1/2022  Extremities and skin: no edema noted, no rashes  Intact skin  Neurological: alert, cooperative and responsive, Oriented x 3, moving all 4 extremities symmetrically  Laboratory results / Imaging reviewed: Hard copy/ies in medical chart:    * BMP (8/30/2022):    Latest Reference Range & Units 08/30/22 09:22   Sodium 135 - 147 mmol/L 142   Potassium 3 5 - 5 3 mmol/L 3 7   Chloride 96 - 108 mmol/L 105   CO2 21 - 32 mmol/L 31 Anion Gap 4 - 13 mmol/L 6   BUN 5 - 25 mg/dL 29 (H)   Creatinine 0 60 - 1 30 mg/dL 0 98   Glucose, Random 65 - 140 mg/dL 146 (H)   Calcium 8 4 - 10 2 mg/dL 8 1 (L)   eGFR ml/min/1 73sq m 64       * CBC with diff (8/30/2022): Latest Reference Range & Units 08/30/22 09:22   WBC 4 31 - 10 16 Thousand/uL 12 46 (H)   Red Blood Cell Count 3 81 - 5 12 Million/uL 3 48 (L)   Hemoglobin 11 5 - 15 4 g/dL 9 1 (L)   HCT 34 8 - 46 1 % 28 8 (L)   MCV 82 - 98 fL 83   MCH 26 8 - 34 3 pg 26 1 (L)   MCHC 31 4 - 37 4 g/dL 31 6   RDW 11 6 - 15 1 % 20 0 (H)   Platelet Count 254 - 390 Thousands/uL 392 (H)   MPV 8 9 - 12 7 fL 10 2   nRBC /100 WBCs 0   Neutrophils % 43 - 75 % 73   Immat GRANS % 0 - 2 % 2   Lymphocytes Relative 14 - 44 % 15   Monocytes Relative 4 - 12 % 9   Eosinophils 0 - 6 % 1   Basophils Relative 0 - 1 % 0   Immature Grans Absolute 0 00 - 0 20 Thousand/uL 0 25 (H)   Absolute Neutrophils 1 85 - 7 62 Thousands/µL 9 11 (H)   Lymphocytes Absolute 0 60 - 4 47 Thousands/µL 1 87   Absolute Monocytes 0 17 - 1 22 Thousand/µL 1 09   Absolute Eosinophils 0 00 - 0 61 Thousand/µL 0 11   Basophils Absolute 0 00 - 0 10 Thousands/µL 0 03       * HbA1C (8/24/2022) = 8 1 (H)    * Viral Panel (8/24/2022) = WNL except:  COVID-19: Positive      Current Medications: All medications reviewed and updated in Nursing Home Chart    Please note:  Voice-recognition software may have been used in the preparation of this document  Occasional wrong word or "sound-alike" substitutions may have occurred due to the inherent limitations of voice recognition software  Interpretation should be guided by context      NEELAM Thurman  9/2/2022

## 2022-09-01 NOTE — PROGRESS NOTES
Chart review completed for the following sections:   Recent Vital Signs   Allergies/Contradictions   Medication Review    History    [facility]    Problem List   Immunizations   Past hospitalizations and major procedures, including surgery   Significant past illnesses and treatment history including: History and Physical and Other provider notes   Relevant past medications related to the patient's condition    HRR alert received  Chart review done  Pt discharged to [facility] for [address] on 8/30

## 2022-09-02 ENCOUNTER — TELEPHONE (OUTPATIENT)
Dept: INTERNAL MEDICINE CLINIC | Facility: CLINIC | Age: 57
End: 2022-09-02

## 2022-09-02 PROBLEM — R53.81 PHYSICAL DECONDITIONING: Status: ACTIVE | Noted: 2022-09-02

## 2022-09-02 NOTE — TELEPHONE ENCOUNTER
I have called to the pharmacy [facility] to let them know to change the humalog to another brand covered by insurance they said they will complete processing with a covered product

## 2022-09-02 NOTE — ASSESSMENT & PLAN NOTE
Lab Results   Component Value Date    HGBA1C 8 1 (H) 08/24/2022   Goal: < 5 7%  Stable    FBG range (8/31/2022 to 9/1/2022) = 183 to 263  Pre-lunch BG range (8/31/2022 to 9/1/2022) = 172 to 219  Pre-dinner BG range (8/31/2022 to 9/1/2022) = 102 to 215  2100 BG range (8/30/2022 to 9/1/2022) = 207 to 246  Continue Insulin Glagine 30 units at HS/ Insulin Lispro 10 units with meals  Continue Pregabalin 75mg daily (neuropathy)

## 2022-09-02 NOTE — ASSESSMENT & PLAN NOTE
Recent PCR COVID-19 test (8/24/2022): Positive  Managed in hospital with mild COVID pathway and remdesivir and dexamethasone  Has history of prior COVID-19 virus infection (May, 2022)  Received 1st COVID-19 vaccine on 8/22/2022  Patient reported improved breathing patterns this visit  Continue room isolation and droplet precaution per facility protocol  Patient with very good meal completion and acknowledged good sleep pattern at HS  Continue the following maintenance meds:  * Albuterol nebulization Q4HR PRN  * Ipratropium nebulization TID  * Levalbuterol nebulization TID  Continue O2 supplementation to keep SPO2 > 92%  Continue daily tracheostomy tube care  Followed by RT while in STR

## 2022-09-02 NOTE — ASSESSMENT & PLAN NOTE
Wt Readings from Last 3 Encounters:   08/30/22 85 kg (187 lb 6 3 oz)   08/23/22 80 8 kg (178 lb 3 2 oz)   08/19/22 80 8 kg (178 lb 3 2 oz)   Admission wt in OO: 187 5 lbs (9/1/2022)  Patient appears euvolemic on assessment  Continue CHF protocol and weight checks  Continue Furosemide 40mg BID/ Metoprolol Succ ER 50mg BID

## 2022-09-02 NOTE — ASSESSMENT & PLAN NOTE
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

## 2022-09-02 NOTE — ASSESSMENT & PLAN NOTE
Site: Left Calf vein (chronic vs  subacute)  Started on Pradaxa while inpatient as replacement for Eliquis  Recommended for Hematology evaluation in 1-2 months

## 2022-09-02 NOTE — ASSESSMENT & PLAN NOTE
Stable and controlled  BP range (8/30/2022 to 9/1/2022) = 114/66 to 152/85  HR range (8/30/2022 to 9/1/2022) = 74 to 88/min  Continue to following meds:  * Amiodarone 100mg daily  * Metoprolol Succ ER 50mg BID  * Ticagrelor 90mg BID (Hx of Ventricular tachycardia)  * Dagabitran 150mg BID (DVT; replaced for Eliquis failure)  * Amlodipine 5mg daily   To follow-up cardiology office upon discharge

## 2022-09-05 LAB
FUNGUS SPEC CULT: NORMAL
FUNGUS SPEC CULT: NORMAL

## 2022-09-07 ENCOUNTER — NURSING HOME VISIT (OUTPATIENT)
Dept: WOUND CARE | Facility: HOSPITAL | Age: 57
End: 2022-09-07
Payer: COMMERCIAL

## 2022-09-07 ENCOUNTER — PATIENT OUTREACH (OUTPATIENT)
Dept: FAMILY MEDICINE CLINIC | Facility: CLINIC | Age: 57
End: 2022-09-07

## 2022-09-07 DIAGNOSIS — L89.153 PRESSURE INJURY OF SACRAL REGION, STAGE 3 (HCC): Primary | ICD-10-CM

## 2022-09-07 DIAGNOSIS — R26.2 AMBULATORY DYSFUNCTION: ICD-10-CM

## 2022-09-07 PROCEDURE — 99307 SBSQ NF CARE SF MDM 10: CPT | Performed by: NURSE PRACTITIONER

## 2022-09-07 NOTE — ASSESSMENT & PLAN NOTE
Sacrum  -started as unstageable pressure ulcer, onset is prior to admission to skilled nursing facility  - healed  - continue zguard for prevention  - continue to offload  - sign off

## 2022-09-07 NOTE — PROGRESS NOTES
[address] [facility]NAGEMENT   [facility]       Patient ID: Melinda Arana is a 65 y o  female Date of Birth 1965     Location of Service: [facility]    Performed wound round with: Wound team     Chief Complaint : Sacrum    Wound Instructions:  Wound:  Sacrum  Discontinue previous wound order  Cleanse the skin with NSS / wound cleanser  Apply zguard to skin  Frequency : twice a day and prn for soiling  Offload all wounds  Turn and reposition frequently, maximum of every two hours  Instruct / Assist with weight shifting every 15 - 20 minutes when in chair  Increase protein intake  Monitor for any sign of infection or worsening, inform PCP or patient's primary physician in your facility  Allergies  Metformin and Clonidine      Assessment & Plan:  1  Pressure injury of sacral region, stage 3 (HCC)  Assessment & Plan:  Sacrum  -started as unstageable pressure ulcer, onset is prior to admission to skilled nursing facility  - healed  - continue zguard for prevention  - continue to offload  - sign off      2  Ambulatory dysfunction  Assessment & Plan:  On STR             Subjective:   8/31/2022This is a 65 y o , female referred to our service for wound/ skin alterations on sacrum  Patient have a complex medical history including but not limited to  Aortic aneurysm ([address] ), Arthritis, Depression, Diabetes mellitus ([address] ), Fibromyalgia, GERD (gastroesophageal reflux disease), GERD (gastroesophageal reflux disease), H/O cardiovascular stress test, H/O echocardiogram, Hyperlipidemia, Hypertension, Migraines, Psychiatric disorder, and Uncontrolled hypertension    Patient was referred by Senior Care Team  Patient was seen in collaboration with the facility wound team      Wound History:  As per medical Record review, patient was seen for wound consult at  inpatient acute care on August 25, 2022, the wound was diagnosed as on unstageable pressure ulcer    Previous treatment is Tri[employer] paste  Received patient in bed, seems comfortable  Patient is incontinent of both bowel in bladder  Patient have a good appetite  9/7/2022 Follow up for wound on the sacrum   Received patient, not in distress  Facility staff did not report any significant issues related to the wound  Review of Systems   Constitutional: Negative  HENT: Negative  Eyes: Negative  Respiratory: Negative  Cardiovascular: Negative for chest pain and leg swelling  Gastrointestinal: Negative  Endocrine: Negative  Genitourinary: Negative  Musculoskeletal: Positive for gait problem  Skin: Positive for wound  See HPI   Neurological: Negative for dizziness and headaches  Psychiatric/Behavioral: Negative for behavioral problems  Objective:    Physical Exam  Constitutional:       Appearance: She is obese  HENT:      Head: Normocephalic and atraumatic  Nose: Nose normal       Mouth/Throat:      Pharynx: Oropharynx is clear  Eyes:      Conjunctiva/sclera: Conjunctivae normal    Pulmonary:      Effort: Pulmonary effort is normal    Abdominal:      Tenderness: There is no abdominal tenderness  There is no guarding  Genitourinary:     Comments: incontinent  Musculoskeletal:         General: No tenderness  Cervical back: Normal range of motion  Right lower leg: No edema  Left lower leg: No edema  Comments: LROM   Skin:     Findings: Lesion present  Comments: Sacrum - healed   Neurological:      Mental Status: She is alert  Gait: Gait abnormal    Psychiatric:         Mood and Affect: Mood normal          Behavior: Behavior normal               Procedures     Results from last 6 Months   Lab Units 07/28/22  1201   WOUND CULTURE  2+ Growth of Methicillin Resistant Staphylococcus aureus*       Patient's care was coordinated with nursing facility staff  Recent vitals, labs and updated medications were reviewed on EMR or chart system of facility   Past Medical, surgical, social, medication and allergy history and patient's previous records were reviewed and updated as appropriate: Most up-to date information is available in the facility EMR where the patient is currently admitted      Patient Active Problem List   Diagnosis    Anxiety    Cardiac murmur    Carpal tunnel syndrome of left wrist    Change in bowel habit    Chronic pain disorder    Cough    Depression, recurrent [facility]    Retinal hemorrhage due to secondary diabetes ([facility] )    Diabetic peripheral neuropathy (HCC)    Dizziness    Fibromyalgia    Gastroesophageal reflux disease with esophagitis    Hemangioma of bone    Hyperlipidemia associated with type 2 diabetes mellitus ([facility] )    Hypertension    Hypoestrogenism    Low back pain    Lumbar radiculopathy    Medically complex patient    Neuropathy    Non compliance with medical treatment    Noncompliance with diet and medication regimen    Overweight    Primary insomnia    Right-sided thoracic back pain    Sciatica of left side    Screening for colon cancer    Shingles    Thoracic radiculitis    Tobacco abuse    Type 2 diabetes mellitus with hyperglycemia (HCC)    Vertebral body hemangioma    Vertigo    Vaginal discharge    Yeast vaginitis    Recurrent vaginitis    Hypokalemia    Abnormal urinalysis    Thoracic aortic aneurysm without rupture ([facility]    Epigastric pain    Urinary tract infection with hematuria    Bacterial vaginosis    Abscess    Palpitations    Nasal vestibulitis    Orthopnea    CHANTALE (obstructive sleep apnea)    Diarrhea    Trigger finger, right middle finger    Trigger finger, right ring finger    Hypoglycemia    Polypharmacy    Cellulitis of left lower extremity    MODESTO (acute kidney injury) ([facility] )    History of Ventricular tachycardia ([facility])    A-fib ([facility] )    History of Cardiac arrest ([facility] )    Cerebral vascular accident ([facility] )    Cerebral aneurysm    Urinary retention    CAD (coronary artery disease)    Subglottic stenosis    Tracheostomy in place [facility])    Chronic hypoxemic respiratory failure (HCC)    Insomnia secondary to anxiety    Elevated troponin    Anemia    COVID-19    Shortness of breath    Prolonged Q-T interval on ECG    Thoracic aortic aneurysm, ruptured (HCC)    Hypomagnesemia    Chest pain    Moderate protein-calorie malnutrition (HCC)    Hemoptysis    Hyponatremia    Chronic kidney disease    Leukocytosis    Acute Respiratory insufficiency on chronic hypoxic respiratory failure    Pain at Chest tube insertion site   Empyema lung, Right    Chest wall wound infection    Chronic heart failure with preserved ejection fraction (HCC)    DVT (deep venous thrombosis) (HCC)    Pressure injury of sacral region, stage 3 (HCC)    Ambulatory dysfunction    Physical deconditioning     Past Medical History:   Diagnosis Date    Anxiety     Aortic aneurysm (HCC)     Arthritis     Depression     Diabetes mellitus (HCC)     Fibromyalgia     GERD (gastroesophageal reflux disease)     GERD (gastroesophageal reflux disease)     H/O cardiovascular stress test 09/2018    no ischemia  EF 70%   H/O echocardiogram 01/2019    EF 60%  Mild LVH  trivial effusion   Hyperlipidemia     Hypertension     Migraines     Psychiatric disorder     anxiety    Uncontrolled hypertension 2/25/2015    Last Assessment & Plan:  BP today above goal <140/90, apparently asymptomatic  Prior BP elevations were attributed to not taking medication  Consider increased medication if persistent on f/u  Past Surgical History:   Procedure Laterality Date    BACK SURGERY      Lumbar epidural steroid injection    CARDIAC CATHETERIZATION N/A 10/22/2021    Procedure: Cardiac pci;  Surgeon: Regino Ram MD;  [facility];   Service: Cardiology    CARDIAC CATHETERIZATION  10/22/2021    Procedure: Cardiac catheterization;  Surgeon: Regino Ram MD;  [facility]; Service: Cardiology    CARDIAC CATHETERIZATION Left 10/27/2021    Procedure: Cardiac Left Heart Cath;  Surgeon: Eugene Murillo MD;  [facility]; Service: Cardiology    CARDIAC CATHETERIZATION N/A 10/27/2021    Procedure: Cardiac pci;  Surgeon: Eugene Murillo MD;  [facility]; Service: Cardiology    CARDIAC CATHETERIZATION N/A 10/28/2021    Procedure: Cardiac pci;  Surgeon: Lorenza Brenner DO;  [facility]; Service: Cardiology    CARPAL TUNNEL RELEASE Left      SECTION      CHOLECYSTECTOMY      COLONOSCOPY      incomplete    COLONOSCOPY      EYE SURGERY      HYSTERECTOMY      Total    IR CHEST TUBE PLACEMENT  2022    IR CHEST TUBE PLACEMENT  2022    IR CHEST TUBE PLACEMENT  2022    LUNG DECORTICATION Right 2022    Procedure: DECORTICATION LUNG;  Surgeon: Debora Hawthorne MD;  [facility];  Service: Thoracic    OVARIAN CYST REMOVAL      PEG W/TRACHEOSTOMY PLACEMENT N/A 2021    Procedure: TRACHEOSTOMY WITH INSERTION PEG TUBE;  Surgeon: Valery Fung DO;  [facility];  Service: General    THORACOSCOPY VIDEO ASSISTED SURGERY (VATS) Right 2022    Procedure: THORACOSCOPY VIDEO ASSISTED SURGERY (VATS), RIGHT;  Surgeon: Debora Hawthorne MD;  [facility];  Service: Thoracic    TUBAL LIGATION      UPPER GASTROINTESTINAL ENDOSCOPY       Social History     Socioeconomic History    Marital status: Legally      Spouse name: None    Number of children: None    Years of education: None    Highest education level: None   Occupational History    None   Tobacco Use    Smoking status: Former Smoker     Packs/day: 1 00     Years: 30 00     Pack years: 30 00     Types: Cigarettes    Smokeless tobacco: Never Used   Vaping Use    Vaping Use: Never used   Substance and Sexual Activity    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       Drug use: Yes     Types: Marijuana     Comment: medical; seldom use    Sexual activity: Yes     Birth control/protection: Female Sterilization     Comment: Monogamous relationship with monogamous partner   Other Topics Concern    None   Social History Narrative    Most recent tobacco use screenin2019    Do you currently or have you served in the [employer]: No    Were you activated, into active duty, as a member of the [employer] or as a Reservist: No    Advance directive: No    Chewing tobacco: none    Alcohol intake: None    Marital status: Single    Live alone or with others: with others    Family history of heart disease:    aortic aneurysm    High cholesterol: Yes    High blood pressure: Yes    Exercise level: Heavy    Overweight: Yes    Obese: Yes    Diabetes: Yes    General stress level: High    Diet: Regular    Caffeine intake: Occasional    Guns present in home: No    Seat belts used routinely: Yes    Sexual orientation: Heterosexual    Sunscreen used routinely: No    Seat belt/car seat used routinely: Yes    Exercise-How often do you exercise: as tolerated    Do you have regular medical check ups: Yes    Do you have regular dental check ups: Yes    Illicit drugs-years of use:    recovery 23 years - HX of     Social Determinants of Health     Financial Resource Strain: Not on file   Food Insecurity: Food Insecurity Present    Worried About Running Out of Food in the Last Year: Sometimes true    Lela of Food in the Last Year: Sometimes true   Transportation Needs: No Transportation Needs    Lack of Transportation (Medical): No    Lack of Transportation (Non-Medical):  No   Physical Activity: Not on file   Stress: Not on file   Social Connections: Not on file   Intimate Partner Violence: Not on file   Housing Stability: High Risk    Unable to Pay for Housing in the Last Year: No    Number of Places Lived in the Last Year: 1    Unstable Housing in the Last Year: Yes        Current Outpatient Medications:     acetaminophen (TYLENOL) 325 mg tablet, Take 3 tablets (975 mg total) by mouth every 8 (eight) hours, Disp: 30 tablet, Rfl: 0    albuterol (2 5 mg/3 mL) 0 083 % nebulizer solution, Take 3 mL (2 5 mg total) by nebulization every 4 (four) hours as needed for wheezing or shortness of breath, Disp: 180 mL, Rfl: 5    amiodarone 100 mg tablet, TAKE 1 TABLET BY MOUTH DAILY WITH BREAKFAST , Disp: 90 tablet, Rfl: 0    amLODIPine (NORVASC) 5 mg tablet, Take 1 tablet (5 mg total) by mouth daily, Disp: , Rfl: 0    atorvastatin (LIPITOR) 40 mg tablet, Take 1 tablet (40 mg total) by mouth daily with dinner, Disp: 30 tablet, Rfl: 0    benzonatate (TESSALON PERLES) 100 mg capsule, Take 1 capsule (100 mg total) by mouth 3 (three) times a day as needed for cough, Disp: 20 capsule, Rfl: 0    Blood Pressure Monitoring (Sphygmomanometer) MISC, Use 2 (two) times a day, Disp: 1 each, Rfl: 0    dabigatran etexilate (PRADAXA) 150 mg capsu, Take 1 capsule (150 mg total) by mouth every 12 (twelve) hours, Disp: , Rfl: 0    escitalopram (LEXAPRO) 10 mg tablet, TAKE 1 TABLET BY MOUTH EVERY DAY, Disp: 90 tablet, Rfl: 1    ferrous sulfate 325 (65 Fe) mg tablet, Take 1 tablet (325 mg total) by mouth daily with breakfast, Disp: 30 tablet, Rfl: 0    furosemide (LASIX) 40 mg tablet, Take 1 tablet (40 mg total) by mouth 2 (two) times a day, Disp: , Rfl: 0    hydrOXYzine HCL (ATARAX) 25 mg tablet, Take 1 tablet (25 mg total) by mouth every 6 (six) hours as needed for anxiety, Disp: 30 tablet, Rfl: 0    insulin glargine (LANTUS) 100 units/mL subcutaneous injection, Inject 30 Units under the skin daily at bedtime, Disp: 10 mL, Rfl: 0    insulin lispro (HumaLOG KwikPen) 100 units/mL injection pen, Inject 10 Units under the skin 3 (three) times a day with meals, Disp: 15 mL, Rfl: 0    Insulin Pen Needle 31G X 6 MM MISC, 1 Device by Does not apply route 4 (four) times a day, Disp: , Rfl:     ipratropium (ATROVENT) 0 02 % nebulizer solution, Take 2 5 mL (0 5 mg total) by nebulization 3 (three) times a day, Disp: , Rfl: 0    Lancets MISC, Use 1 Device 4 (four) times a day, Disp: , Rfl:     levalbuterol (XOPENEX) 1 25 mg/0 5 mL nebulizer solution, Take 0 5 mL (1 25 mg total) by nebulization 3 (three) times a day, Disp: , Rfl: 0    lidocaine (LIDODERM) 5 %, Apply 2 patches topically daily Remove & Discard patch within 12 hours or as directed by MD, Disp: 30 patch, Rfl: 0    LORazepam (Ativan) 1 mg tablet, Take 0 5 tablets (0 5 mg total) by mouth every 8 (eight) hours as needed for anxiety or sleep, Disp: 30 tablet, Rfl: 0    metoprolol succinate (TOPROL-XL) 50 mg 24 hr tablet, Take 1 tablet (50 mg total) by mouth every 12 (twelve) hours, Disp: 180 tablet, Rfl: 3    mirtazapine (REMERON) 7 5 MG tablet, Take 1 tablet (7 5 mg total) by mouth daily at bedtime, Disp: 30 tablet, Rfl: 0    Novofine Pen Needle 32G X 6 MM MISC, USE 3 (THREE) TIMES A DAY WITH MEALS, Disp: 100 each, Rfl: 2    pantoprazole (PROTONIX) 40 mg tablet, Take 1 tablet (40 mg total) by mouth daily, Disp: 90 tablet, Rfl: 3    polyethylene glycol (MIRALAX) 17 g packet, Take 17 g by mouth daily, Disp: , Rfl: 0    pregabalin (LYRICA) 75 mg capsule, TAKE ONE CAPSULE EVERY DAY, Disp: 90 capsule, Rfl: 1    ticagrelor (BRILINTA) 90 MG, Take 1 tablet (90 mg total) by mouth every 12 (twelve) hours, Disp: 30 tablet, Rfl: 0    white petrolatum-mineral oil (EUCERIN,HYDROCERIN) cream, Apply topically 2 (two) times a day, Disp: 113 g, Rfl: 0  Family History   Problem Relation Age of Onset    Hypertension Mother    Doris Diones Arthritis Mother     Diabetes Father     Other Sister         renal cell carcinoma    Diabetes Other     Factor V Leiden deficiency Other               Coordination of Care: Wound team aware of the treatment plan  Facility nurse will provide wound treatment and monitor the wound for any changes       Patient / Staff education : Patient / Staff was given education on sign of infection and pressure ulcer prevention  Patient/ Staff verbalized understanding     Follow up :  Sign off    Voice-recognition software may have been used in the preparation of this document  Occasional wrong word or "sound-alike" substitutions may have occurred due to the inherent limitations of voice recognition software  Interpretation should be guided by context        NEELAM Abbott

## 2022-09-07 NOTE — PROGRESS NOTES
Chart review done  Pt remains at [address] in [facility] for ambulatory dysfunction and wound care  Pt is rising [employer] CM will continue to follow

## 2022-09-08 ENCOUNTER — NURSING HOME VISIT (OUTPATIENT)
Dept: GERIATRICS | Facility: OTHER | Age: 57
End: 2022-09-08
Payer: COMMERCIAL

## 2022-09-08 ENCOUNTER — TELEPHONE (OUTPATIENT)
Dept: HEMATOLOGY ONCOLOGY | Facility: CLINIC | Age: 57
End: 2022-09-08

## 2022-09-08 VITALS
SYSTOLIC BLOOD PRESSURE: 125 MMHG | DIASTOLIC BLOOD PRESSURE: 79 MMHG | TEMPERATURE: 97.2 F | HEART RATE: 62 BPM | OXYGEN SATURATION: 95 % | RESPIRATION RATE: 18 BRPM | BODY MASS INDEX: 27.17 KG/M2 | WEIGHT: 184 LBS

## 2022-09-08 DIAGNOSIS — U07.1 COVID-19: ICD-10-CM

## 2022-09-08 DIAGNOSIS — J86.9 EMPYEMA LUNG (HCC): ICD-10-CM

## 2022-09-08 DIAGNOSIS — E11.42 DIABETIC PERIPHERAL NEUROPATHY (HCC): ICD-10-CM

## 2022-09-08 DIAGNOSIS — R26.2 AMBULATORY DYSFUNCTION: ICD-10-CM

## 2022-09-08 DIAGNOSIS — E11.65 TYPE 2 DIABETES MELLITUS WITH HYPERGLYCEMIA, WITH LONG-TERM CURRENT USE OF INSULIN (HCC): ICD-10-CM

## 2022-09-08 DIAGNOSIS — Z79.4 TYPE 2 DIABETES MELLITUS WITH HYPERGLYCEMIA, WITH LONG-TERM CURRENT USE OF INSULIN (HCC): ICD-10-CM

## 2022-09-08 DIAGNOSIS — Q80.9 XERODERMA: Primary | ICD-10-CM

## 2022-09-08 DIAGNOSIS — F33.9 DEPRESSION, RECURRENT (HCC): ICD-10-CM

## 2022-09-08 PROCEDURE — 99309 SBSQ NF CARE MODERATE MDM 30: CPT | Performed by: NURSE PRACTITIONER

## 2022-09-08 NOTE — TELEPHONE ENCOUNTER
Appointment Confirmation (to confirm pre existing appointments - ONLY)  No need to route   Appointment with  Daune [employer]   Appointment date & time 9/26/22 @ 9am   Location [facility]   Patient verbilized Understanding yes

## 2022-09-08 NOTE — PROGRESS NOTES
[facility]  [address]  [phone]  [place]  Code 31 (STR)      NAME: Kelli Red  AGE: 65 y o  SEX: female CODE STATUS: CPR    DATE OF ENCOUNTER: 9/8/2022    Assessment and Plan     1  Xeroderma  Assessment & Plan:  C/O dry/itchy skin to face  There is dry flaky skin noted on exam  Start Sarna BID      2  Ambulatory dysfunction  Assessment & Plan:   Multifactorial   Continue PT/OT   Fall Precautions   Ensure adequate nutrition/hydration    Monitor CBC/BMP - Check tomorrow 9/9/22    following for d/c planning     · Patient states on exam today that she is "homeless" her son was evicted and that is who she was going to live with after d/c from [facility]  · Patient states she is working on getting her Social Security back but it has been difficult due to being trach dependent, she states she has never been able to speak  · Spoke with  regarding needing assistance for d/c plan        3  COVID-19  Assessment & Plan:  · Covid + 8/24/22  · Mild pathway treatment while inpatient with remdesivir and dexamethasone  · On Trach collar - good sats- states she feels well- no increased cough or secretions     1st COVID Vaccine given 8/22/22    Continue supportive care and Nebs as ordered   PRN Tessalon Perles   Trach care every shift       4  Depression, recurrent ([facility] [address] )  Assessment & Plan:  Stable   Denies SI/HI  Continue Lexapro 10 mg daily and Mirtazapine 7 5 mg Daily     Hx of Anxiety and Insomnia as well   Continue Lorazepam 0 5 mg PO Q 8 hrs PRN   States she is currently sleeping well         5  Diabetic peripheral neuropathy [facility])  Assessment & Plan:    Lab Results   Component Value Date    HGBA1C 8 1 (H) 08/24/2022     Stable   Continue home dose Lyrica       6   Type 2 diabetes mellitus with hyperglycemia, with long-term current use of insulin [facility])  Assessment & Plan:    Lab Results   Component Value Date    HGBA1C 8 1 (H) 08/24/2022     Needs better A1c control     FBS ranging 180-220  Post Prandial ranging 200-250    Continue Glargine 30 units Q HS  HumaLog 10 units TID with meals     Recheck HgbA1c around 11/25/2022      7  Empyema lung, Right  Assessment & Plan:  S/p VATS 8/6/22  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   Doing well on Trach Collar with FiO2 35%  Continue OP f/u with Pulmonology   Recommend OP f/u with ENT for trach management          All medications and routine orders were reviewed and updated as needed  Chief Complaint     STR follow up visit    Past Medical and Surgica History      Past Medical History:   Diagnosis Date    Anxiety     Aortic aneurysm ([facility] [address] )     Arthritis     Depression     Diabetes mellitus ([facility] [address] )     Fibromyalgia     GERD (gastroesophageal reflux disease)     GERD (gastroesophageal reflux disease)     H/O cardiovascular stress test 09/2018    no ischemia  EF 70%   H/O echocardiogram 01/2019    EF 60%  Mild LVH  trivial effusion   Hyperlipidemia     Hypertension     Migraines     Psychiatric disorder     anxiety    Uncontrolled hypertension 2/25/2015    Last Assessment & Plan:  BP today above goal <140/90, apparently asymptomatic  Prior BP elevations were attributed to not taking medication  Consider increased medication if persistent on f/u  Past Surgical History:   Procedure Laterality Date    BACK SURGERY      Lumbar epidural steroid injection    CARDIAC CATHETERIZATION N/A 10/22/2021    Procedure: Cardiac pci;  Surgeon: Cat Mike MD;  [facility]; Service: Cardiology    CARDIAC CATHETERIZATION  10/22/2021    Procedure: Cardiac catheterization;  Surgeon: Cat Mike MD;  [facility]; Service: Cardiology    CARDIAC CATHETERIZATION Left 10/27/2021    Procedure: Cardiac Left Heart Cath;  Surgeon: Jocelyne Gatica MD;  [facility];   Service: Cardiology    CARDIAC CATHETERIZATION N/A 10/27/2021    Procedure: Cardiac pci;  Surgeon: Eugene Murillo MD;  [facility]; Service: Cardiology    CARDIAC CATHETERIZATION N/A 10/28/2021    Procedure: Cardiac pci;  Surgeon: Lorenza Brenner DO;  [facility]; Service: Cardiology    CARPAL TUNNEL RELEASE Left      SECTION      CHOLECYSTECTOMY      COLONOSCOPY      incomplete    COLONOSCOPY      EYE SURGERY      HYSTERECTOMY      Total    IR CHEST TUBE PLACEMENT  2022    IR CHEST TUBE PLACEMENT  2022    IR CHEST TUBE PLACEMENT  2022    LUNG DECORTICATION Right 2022    Procedure: DECORTICATION LUNG;  Surgeon: Debora Hawthorne MD;  Location: [facility] MAIN [facility];  Service: Thoracic    OVARIAN CYST REMOVAL      PEG W/TRACHEOSTOMY PLACEMENT N/A 2021    Procedure: TRACHEOSTOMY WITH INSERTION PEG TUBE;  Surgeon: Valery Fung DO;  Location: [facility] MAIN [facility];  Service: General    THORACOSCOPY VIDEO ASSISTED SURGERY (VATS) Right 2022    Procedure: THORACOSCOPY VIDEO ASSISTED SURGERY (VATS), RIGHT;  Surgeon: Debora Hawthorne MD;  Location: [facility];  Service: Thoracic    TUBAL LIGATION      UPPER GASTROINTESTINAL ENDOSCOPY       Allergies   Allergen Reactions    Metformin Diarrhea    Clonidine Rash     Patch           History of Present Illness     Burt Aldana is a 65year old female admitted to [facility] on 22 for STR  Past Medical Hx including but not limited to Chronic Hypoxic Resp Failure, Trach dependent on 6 L NC, CHANTALE, AFIB on Eliquis, CAD s/p cardiac arrest s/p PCI on Brilinta, HTN, CVA, CHF (EF 50%), DM2 with neuropathy, GERD, CKD3,  seen in collaboration with nursing for medical mgmt and STR follow up  Hospital Course:   Presented to [facility] 2022 as a direct transfer from [facility] for cardiothoracic surgery eval for possible  VATS procedure due to right lung empyema    Patient originally presented to [facility] on  with chest pain and shortness of breath she was found to have a spontaneous pneumothorax s/p chest tube placed in IR on 07/21 later upsized on 07/22  Patient developed severe pain on the right side of her chest tube insertion subsequently with removal on 07/24  Imaging revealed persistent pneumothorax and empyema and patient was transferred to [facility] for repeat chest tube insertion  Patient was evaluated by acute pain services provided peripheral block to help with her pain control  She was treated with IV vancomycin and cefepime as her wound and sputum cultures grew MRSA  Id was following and recommended to continue antibiotics through 8/20/2022  Pulmonary also following who recommended  VATS procedure, this was done on 8/6/2022 due to her right lung empyema  Patient had apical chest tubes placed which were ventrally removed on 8/9/2022  Patient developed ABL a during procedure and required total of 5 units PRBCs throughout her hospital stay to maintain an optimum hemoglobin level  Of note patient also has history of CHF upon her initial admission 715 was treated with Bumex 2 mg b i d  and Aldactone 100 mg daily unfortunately patient did have worsening renal function and these medications were held  She will need to follow-up with cardiology as an outpatient there is is office visit scheduled 8/25/2022  Patient also had uncontrolled diabetes throughout her hospital stay, insulin was adjusted and eventually was discharged on Lantus 30 units q h s  and Humalog 5 units t i d  PT/OT recommended discharge to short-term rehab  Developed acute onset respiratory distress with hypoxia at SNF  She was suctioned without relief  EMS was called and she was admitted to [facility] 8/24/2022 through 8/30/2022  Patient tested positive for COVID-19 upon this admission  Also noted to have been COVID positive 5/19/2022  Patient had thick sputum whitish to yellow in color  She remained afebrile    Was requiring 8 L O2 via trach mask saturating 95%  Patient noted to be on 10 L oxygen at home  Respiratory Symptoms were unlikely related to COVID due to her resolution of symptoms in a timely manner  Patient was however treated on a mild COVID pathway with remdesivir and dexamethasone  She also received hypertropia and levalbuterol nebulizer  BNP on admission 8400  Chest x-ray on this admission revealed moderate pulmonary vascular congestion with a small right pleural effusion  Cardiology recommended furosemide 40 mg b i d   Patient also found to have DVT of left soleus vein during this admission, elevated D-dimer to 6 2  DVT is chronic versus subacute nonocclusive  This was in the setting of Eliquis use due to atrial fibrillation  Suspected Eliquis failure  Hematology recommending Pradaxa replacement for Eliquis in setting of Eliquis failure, switched to Pradaxa/dabigatran  She will need outpatient hematology evaluation in 1-2 months  Rehab:   Seen and examined at bedside today  Patient is a reliable historian  She is able to make her needs known, AAO X 3 on exam  She has a trach and can not speak but able to reach lips and uses pen/paper or whiteboard  Patient states her legs still feel weak but has been up and ambulating in room with RW, able to use bathroom independeltly  Patient states she is sleeping well, denies pain  States her breathing is improved  Her main concern today is dry/itchy skin  She is also concerned because she was just told by her son that he was evicted and is now homeless, rendering patient homeless as well  Patient states she is worried about where she will go  She states she is working on getting social security back but has been difficult due to no voice  Patient denies any SI/HI  Patient states she is eating well, hydrating, denies bowel or bladder issues  Patient is actively participating in therapy  No concerns from nursing at this time                      The patient's allergies, past medical, surgical, social and family history were reviewed and unchanged  Review of Systems     Review of Systems   Constitutional: Negative for chills and fever  HENT: Negative for congestion, rhinorrhea and sore throat  Eyes: Negative for pain and visual disturbance  Respiratory: Negative for cough, shortness of breath and wheezing  Cardiovascular: Negative for chest pain and palpitations  Gastrointestinal: Negative for abdominal pain, constipation, diarrhea and nausea  Genitourinary: Negative for decreased urine volume, difficulty urinating, dysuria and hematuria  Musculoskeletal: Negative for arthralgias and back pain  Skin: Negative for color change and rash  C/o dry/itchy skin    Neurological: Positive for weakness  Negative for dizziness, syncope, numbness and headaches  Psychiatric/Behavioral: Negative for dysphoric mood and sleep disturbance  The patient is nervous/anxious  Objective     Vitals:   Vitals:    09/08/22 0741   BP: 125/79   Pulse: 62   Resp: 18   Temp: (!) 97 2 °F (36 2 °C)   SpO2: 95%         Physical Exam  Vitals and nursing note reviewed  Constitutional:       General: She is not in acute distress  Appearance: Normal appearance  Comments: Ahmet Sanchez in place with trach collar   HENT:      Head: Normocephalic and atraumatic  Nose: No congestion or rhinorrhea  Mouth/Throat:      Mouth: Mucous membranes are pale and dry  Eyes:      General: No scleral icterus  Conjunctiva/sclera: Conjunctivae normal       Pupils: Pupils are equal, round, and reactive to light  Neck:      Trachea: Tracheostomy present  No tracheal deviation  Cardiovascular:      Rate and Rhythm: Normal rate and regular rhythm  Pulses: Normal pulses  Heart sounds: Normal heart sounds  No murmur heard  Pulmonary:      Effort: Pulmonary effort is normal  No respiratory distress  Breath sounds: Decreased breath sounds present   No wheezing, rhonchi or rales    Abdominal:      General: Bowel sounds are normal  There is no distension  Palpations: Abdomen is soft  There is no mass  Tenderness: There is no abdominal tenderness  Hernia: No hernia is present  Musculoskeletal:         General: No swelling or tenderness  Cervical back: Full passive range of motion without pain and normal range of motion  Lymphadenopathy:      Cervical: No cervical adenopathy  Skin:     General: Skin is warm and dry  Coloration: Skin is not pale  Findings: No rash  Neurological:      General: No focal deficit present  Mental Status: She is alert and oriented to person, place, and time  Mental status is at baseline  GCS: GCS eye subscore is 4  GCS verbal subscore is 5  GCS motor subscore is 6  Motor: Weakness present  Gait: Gait normal    Psychiatric:         Attention and Perception: Attention normal          Mood and Affect: Mood normal          Behavior: Behavior normal  Behavior is cooperative  Pertinent Laboratory/Diagnostic Studies:   Reviewed in facility chart-stable      Current Medications   Medications reviewed and updated see facility [facility] for details        Current Outpatient Medications:     camphor-menthol (SARNA) lotion, Apply 1 application topically 2 (two) times a day For dry/itchy skin, Disp: , Rfl:     levalbuterol (XOPENEX) 1 25 mg/3 mL nebulizer solution, Take 1 25 mg by nebulization 3 (three) times a day SOB/Wheezing, Disp: , Rfl:     multivitamin-minerals therapeutic (THERA M PLUS), Take 1 tablet by mouth in the morning, Disp: , Rfl:     omeprazole (PriLOSEC) 20 mg delayed release capsule, Take 20 mg by mouth daily, Disp: , Rfl:     acetaminophen (TYLENOL) 325 mg tablet, Take 3 tablets (975 mg total) by mouth every 8 (eight) hours, Disp: 30 tablet, Rfl: 0    albuterol (2 5 mg/3 mL) 0 083 % nebulizer solution, Take 3 mL (2 5 mg total) by nebulization every 4 (four) hours as needed for wheezing or shortness of breath, Disp: 180 mL, Rfl: 5    amiodarone 100 mg tablet, TAKE 1 TABLET BY MOUTH DAILY WITH BREAKFAST , Disp: 90 tablet, Rfl: 0    amLODIPine (NORVASC) 5 mg tablet, Take 1 tablet (5 mg total) by mouth daily, Disp: , Rfl: 0    atorvastatin (LIPITOR) 40 mg tablet, Take 1 tablet (40 mg total) by mouth daily with dinner, Disp: 30 tablet, Rfl: 0    benzonatate (TESSALON PERLES) 100 mg capsule, Take 1 capsule (100 mg total) by mouth 3 (three) times a day as needed for cough, Disp: 20 capsule, Rfl: 0    dabigatran etexilate (PRADAXA) 150 mg capsu, Take 1 capsule (150 mg total) by mouth every 12 (twelve) hours, Disp: , Rfl: 0    escitalopram (LEXAPRO) 10 mg tablet, TAKE 1 TABLET BY MOUTH EVERY DAY, Disp: 90 tablet, Rfl: 1    ferrous sulfate 325 (65 Fe) mg tablet, Take 1 tablet (325 mg total) by mouth daily with breakfast, Disp: 30 tablet, Rfl: 0    furosemide (LASIX) 40 mg tablet, Take 1 tablet (40 mg total) by mouth 2 (two) times a day, Disp: , Rfl: 0    hydrOXYzine HCL (ATARAX) 25 mg tablet, Take 1 tablet (25 mg total) by mouth every 6 (six) hours as needed for anxiety, Disp: 30 tablet, Rfl: 0    insulin glargine (LANTUS) 100 units/mL subcutaneous injection, Inject 30 Units under the skin daily at bedtime, Disp: 10 mL, Rfl: 0    insulin lispro (HumaLOG KwikPen) 100 units/mL injection pen, Inject 10 Units under the skin 3 (three) times a day with meals, Disp: 15 mL, Rfl: 0    ipratropium (ATROVENT) 0 02 % nebulizer solution, Take 2 5 mL (0 5 mg total) by nebulization 3 (three) times a day, Disp: , Rfl: 0    lidocaine (LIDODERM) 5 %, Apply 2 patches topically daily Remove & Discard patch within 12 hours or as directed by MD, Disp: 30 patch, Rfl: 0    LORazepam (Ativan) 1 mg tablet, Take 0 5 tablets (0 5 mg total) by mouth every 8 (eight) hours as needed for anxiety or sleep, Disp: 30 tablet, Rfl: 0    metoprolol succinate (TOPROL-XL) 50 mg 24 hr tablet, Take 1 tablet (50 mg total) by mouth every 12 (twelve) hours, Disp: 180 tablet, Rfl: 3    mirtazapine (REMERON) 7 5 MG tablet, Take 1 tablet (7 5 mg total) by mouth daily at bedtime, Disp: 30 tablet, Rfl: 0    polyethylene glycol (MIRALAX) 17 g packet, Take 17 g by mouth daily, Disp: , Rfl: 0    pregabalin (LYRICA) 75 mg capsule, TAKE ONE CAPSULE EVERY DAY, Disp: 90 capsule, Rfl: 1    ticagrelor (BRILINTA) 90 MG, Take 1 tablet (90 mg total) by mouth every 12 (twelve) hours, Disp: 30 tablet, Rfl: 0     Plan discussed with Dr Mickie Anton noted agreement with assessment and plan  Please note:  Voice-recognition software may have been used in the preparation of this document  Occasional wrong word or "sound-alike" substitutions may have occurred due to the inherent limitations of voice recognition software  Interpretation should be guided by context           Pepper NEELAM Stewart  9/8/2022  7:42 AM

## 2022-09-12 PROBLEM — I50.20 SYSTOLIC CONGESTIVE HEART FAILURE (HCC): Status: RESOLVED | Noted: 2022-01-01 | Resolved: 2022-09-12

## 2022-09-12 PROBLEM — Q80.9 XERODERMA: Status: ACTIVE | Noted: 2022-09-12

## 2022-09-12 PROBLEM — R52 PAIN: Status: RESOLVED | Noted: 2022-07-27 | Resolved: 2022-09-12

## 2022-09-12 PROBLEM — N17.9 AKI (ACUTE KIDNEY INJURY) (HCC): Status: RESOLVED | Noted: 2021-10-24 | Resolved: 2022-09-12

## 2022-09-12 PROBLEM — M65.331 TRIGGER FINGER, RIGHT MIDDLE FINGER: Status: RESOLVED | Noted: 2021-07-12 | Resolved: 2022-09-12

## 2022-09-12 PROBLEM — D72.829 LEUKOCYTOSIS: Status: RESOLVED | Noted: 2022-07-23 | Resolved: 2022-09-12

## 2022-09-12 PROBLEM — H35.049 RETINAL HEMORRHAGE DUE TO SECONDARY DIABETES (HCC): Status: RESOLVED | Noted: 2017-07-26 | Resolved: 2022-09-12

## 2022-09-12 PROBLEM — J38.6 SUBGLOTTIC STENOSIS: Status: ACTIVE | Noted: 2022-01-18

## 2022-09-12 PROBLEM — R31.9 URINARY TRACT INFECTION WITH HEMATURIA: Status: RESOLVED | Noted: 2020-02-03 | Resolved: 2022-09-12

## 2022-09-12 PROBLEM — R10.13 EPIGASTRIC PAIN: Status: RESOLVED | Noted: 2019-07-02 | Resolved: 2022-09-12

## 2022-09-12 PROBLEM — B37.3 YEAST VAGINITIS: Status: RESOLVED | Noted: 2018-04-12 | Resolved: 2022-09-12

## 2022-09-12 PROBLEM — N76.0 BACTERIAL VAGINOSIS: Status: RESOLVED | Noted: 2020-07-09 | Resolved: 2022-09-12

## 2022-09-12 PROBLEM — N89.8 VAGINAL DISCHARGE: Status: RESOLVED | Noted: 2018-04-12 | Resolved: 2022-09-12

## 2022-09-12 PROBLEM — D64.9 ANEMIA: Status: ACTIVE | Noted: 2022-01-01

## 2022-09-12 PROBLEM — R04.2 HEMOPTYSIS: Status: RESOLVED | Noted: 2022-07-17 | Resolved: 2022-09-12

## 2022-09-12 PROBLEM — Z79.899 POLYPHARMACY: Status: RESOLVED | Noted: 2021-09-02 | Resolved: 2022-09-12

## 2022-09-12 PROBLEM — E28.39 HYPOESTROGENISM: Status: RESOLVED | Noted: 2017-07-26 | Resolved: 2022-09-12

## 2022-09-12 PROBLEM — N76.0 RECURRENT VAGINITIS: Status: RESOLVED | Noted: 2018-04-12 | Resolved: 2022-09-12

## 2022-09-12 PROBLEM — E13.319 RETINAL HEMORRHAGE DUE TO SECONDARY DIABETES (HCC): Status: RESOLVED | Noted: 2017-07-26 | Resolved: 2022-09-12

## 2022-09-12 PROBLEM — R00.2 PALPITATIONS: Status: RESOLVED | Noted: 2020-11-17 | Resolved: 2022-09-12

## 2022-09-12 PROBLEM — R94.31 PROLONGED Q-T INTERVAL ON ECG: Status: RESOLVED | Noted: 2022-05-20 | Resolved: 2022-09-12

## 2022-09-12 PROBLEM — J43.9 LUNG BULLAE (HCC): Status: RESOLVED | Noted: 2022-01-18 | Resolved: 2022-09-12

## 2022-09-12 PROBLEM — B37.31 YEAST VAGINITIS: Status: RESOLVED | Noted: 2018-04-12 | Resolved: 2022-09-12

## 2022-09-12 PROBLEM — I71.20 THORACIC AORTIC ANEURYSM WITHOUT RUPTURE: Status: RESOLVED | Noted: 2018-08-12 | Resolved: 2022-09-12

## 2022-09-12 PROBLEM — Z86.73 H/O: CVA (CEREBROVASCULAR ACCIDENT): Status: RESOLVED | Noted: 2022-01-01 | Resolved: 2022-09-12

## 2022-09-12 PROBLEM — R77.8 ELEVATED TROPONIN: Status: RESOLVED | Noted: 2022-01-01 | Resolved: 2022-09-12

## 2022-09-12 PROBLEM — R06.01 ORTHOPNEA: Status: RESOLVED | Noted: 2021-01-14 | Resolved: 2022-09-12

## 2022-09-12 PROBLEM — R06.02 SHORTNESS OF BREATH: Status: RESOLVED | Noted: 2022-05-20 | Resolved: 2022-09-12

## 2022-09-12 PROBLEM — R77.8 ELEVATED TROPONIN: Status: ACTIVE | Noted: 2022-01-01

## 2022-09-12 PROBLEM — N39.0 URINARY TRACT INFECTION WITH HEMATURIA: Status: RESOLVED | Noted: 2020-02-03 | Resolved: 2022-09-12

## 2022-09-12 PROBLEM — E87.1 HYPONATREMIA: Status: RESOLVED | Noted: 2022-07-19 | Resolved: 2022-09-12

## 2022-09-12 PROBLEM — I50.20 SYSTOLIC CONGESTIVE HEART FAILURE (HCC): Status: ACTIVE | Noted: 2022-01-01

## 2022-09-12 PROBLEM — E83.42 HYPOMAGNESEMIA: Status: RESOLVED | Noted: 2022-06-28 | Resolved: 2022-09-12

## 2022-09-12 PROBLEM — I50.22 CHRONIC SYSTOLIC CHF (CONGESTIVE HEART FAILURE) (HCC): Status: ACTIVE | Noted: 2022-01-18

## 2022-09-12 PROBLEM — J43.9 LUNG BULLAE (HCC): Status: ACTIVE | Noted: 2022-01-18

## 2022-09-12 PROBLEM — Z86.79 HISTORY OF VENTRICULAR TACHYCARDIA: Status: RESOLVED | Noted: 2022-01-01 | Resolved: 2022-09-12

## 2022-09-12 PROBLEM — L89.153 PRESSURE INJURY OF SACRAL REGION, STAGE 3 (HCC): Status: RESOLVED | Noted: 2022-08-31 | Resolved: 2022-09-12

## 2022-09-12 PROBLEM — M65.341 TRIGGER FINGER, RIGHT RING FINGER: Status: RESOLVED | Noted: 2021-07-12 | Resolved: 2022-09-12

## 2022-09-12 PROBLEM — J96.91 RESPIRATORY FAILURE WITH HYPOXIA (HCC): Status: ACTIVE | Noted: 2022-01-01

## 2022-09-12 PROBLEM — Z86.79 HISTORY OF VENTRICULAR TACHYCARDIA: Status: ACTIVE | Noted: 2022-01-01

## 2022-09-12 PROBLEM — G62.9 NEUROPATHY: Status: RESOLVED | Noted: 2017-07-26 | Resolved: 2022-09-12

## 2022-09-12 PROBLEM — R07.9 CHEST PAIN: Status: RESOLVED | Noted: 2022-06-28 | Resolved: 2022-09-12

## 2022-09-12 PROBLEM — I71.2 THORACIC AORTIC ANEURYSM WITHOUT RUPTURE: Status: RESOLVED | Noted: 2018-08-12 | Resolved: 2022-09-12

## 2022-09-12 PROBLEM — Z95.5 H/O HEART ARTERY STENT: Status: ACTIVE | Noted: 2022-01-01

## 2022-09-12 PROBLEM — B96.89 BACTERIAL VAGINOSIS: Status: RESOLVED | Noted: 2020-07-09 | Resolved: 2022-09-12

## 2022-09-12 PROBLEM — I25.5 ISCHEMIC CARDIOMYOPATHY: Status: ACTIVE | Noted: 2022-01-18

## 2022-09-12 PROBLEM — Z98.890 HX OF TRACHEOSTOMY: Status: RESOLVED | Noted: 2022-01-18 | Resolved: 2022-09-12

## 2022-09-12 PROBLEM — L02.91 ABSCESS: Status: RESOLVED | Noted: 2020-10-11 | Resolved: 2022-09-12

## 2022-09-12 PROBLEM — Z98.890 HX OF TRACHEOSTOMY: Status: ACTIVE | Noted: 2022-01-18

## 2022-09-12 PROBLEM — R33.9 URINARY RETENTION: Status: RESOLVED | Noted: 2021-11-11 | Resolved: 2022-09-12

## 2022-09-12 PROBLEM — R82.90 ABNORMAL URINALYSIS: Status: RESOLVED | Noted: 2018-08-11 | Resolved: 2022-09-12

## 2022-09-12 PROBLEM — J34.89 NASAL VESTIBULITIS: Status: RESOLVED | Noted: 2021-01-14 | Resolved: 2022-09-12

## 2022-09-12 PROBLEM — E16.2 HYPOGLYCEMIA: Status: RESOLVED | Noted: 2021-09-02 | Resolved: 2022-09-12

## 2022-09-12 PROBLEM — R19.4 CHANGE IN BOWEL HABIT: Status: RESOLVED | Noted: 2017-04-24 | Resolved: 2022-09-12

## 2022-09-12 PROBLEM — Z86.73 H/O: CVA (CEREBROVASCULAR ACCIDENT): Status: ACTIVE | Noted: 2022-01-01

## 2022-09-12 PROBLEM — R19.7 DIARRHEA: Status: RESOLVED | Noted: 2021-04-20 | Resolved: 2022-09-12

## 2022-09-12 PROBLEM — E87.6 HYPOKALEMIA: Status: RESOLVED | Noted: 2018-08-11 | Resolved: 2022-09-12

## 2022-09-12 RX ORDER — MULTIPLE VITAMINS W/ MINERALS TAB 9MG-400MCG
1 TAB ORAL DAILY
COMMUNITY

## 2022-09-12 RX ORDER — LEVALBUTEROL INHALATION SOLUTION 1.25 MG/3ML
1.25 SOLUTION RESPIRATORY (INHALATION) 3 TIMES DAILY
COMMUNITY
End: 2022-10-27 | Stop reason: SDUPTHER

## 2022-09-12 RX ORDER — OMEPRAZOLE 20 MG/1
20 CAPSULE, DELAYED RELEASE ORAL DAILY
COMMUNITY

## 2022-09-12 NOTE — ASSESSMENT & PLAN NOTE
Stable   Denies SI/HI  Continue Lexapro 10 mg daily and Mirtazapine 7 5 mg Daily     Hx of Anxiety and Insomnia as well   Continue Lorazepam 0 5 mg PO Q 8 hrs PRN   States she is currently sleeping well

## 2022-09-12 NOTE — ASSESSMENT & PLAN NOTE
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

## 2022-09-12 NOTE — ASSESSMENT & PLAN NOTE
Lab Results   Component Value Date    HGBA1C 8 1 (H) 08/24/2022     Stable   Continue home dose Lyrica

## 2022-09-12 NOTE — ASSESSMENT & PLAN NOTE
· Covid + 8/24/22  · Mild pathway treatment while inpatient with remdesivir and dexamethasone  · On Trach collar - good sats- states she feels well- no increased cough or secretions     1st COVID Vaccine given 8/22/22    Continue supportive care and Nebs as ordered   PRN Tessalon Perles   Trach care every shift

## 2022-09-12 NOTE — ASSESSMENT & PLAN NOTE
Lab Results   Component Value Date    HGBA1C 8 1 (H) 08/24/2022     Needs better A1c control     FBS ranging 180-220  Post Prandial ranging 200-250    Continue Glargine 30 units Q HS  HumaLog 10 units TID with meals     Recheck HgbA1c around 11/25/2022

## 2022-09-12 NOTE — ASSESSMENT & PLAN NOTE
 Multifactorial   Continue PT/OT   Fall Precautions   Ensure adequate nutrition/hydration    Monitor CBC/BMP - Check tomorrow 9/9/22    following for d/c planning     · Patient states on exam today that she is "homeless" her son was evicted and that is who she was going to live with after d/c from [facility]     · Patient states she is working on getting her Social Security back but it has been difficult due to being trach dependent, she states she has never been able to speak  · Spoke with  regarding needing assistance for d/c plan

## 2022-09-13 ENCOUNTER — NURSING HOME VISIT (OUTPATIENT)
Dept: GERIATRICS | Facility: OTHER | Age: 57
End: 2022-09-13
Payer: COMMERCIAL

## 2022-09-13 VITALS
TEMPERATURE: 98.2 F | RESPIRATION RATE: 16 BRPM | HEART RATE: 62 BPM | OXYGEN SATURATION: 99 % | SYSTOLIC BLOOD PRESSURE: 142 MMHG | WEIGHT: 184 LBS | DIASTOLIC BLOOD PRESSURE: 82 MMHG | BODY MASS INDEX: 27.17 KG/M2

## 2022-09-13 DIAGNOSIS — I82.462 DEEP VEIN THROMBOSIS (DVT) OF CALF MUSCLE VEIN OF LEFT LOWER EXTREMITY, UNSPECIFIED CHRONICITY (HCC): ICD-10-CM

## 2022-09-13 DIAGNOSIS — R26.2 AMBULATORY DYSFUNCTION: ICD-10-CM

## 2022-09-13 DIAGNOSIS — I25.10 CORONARY ARTERY DISEASE INVOLVING NATIVE CORONARY ARTERY OF NATIVE HEART WITHOUT ANGINA PECTORIS: ICD-10-CM

## 2022-09-13 DIAGNOSIS — I50.22 CHRONIC SYSTOLIC CHF (CONGESTIVE HEART FAILURE) (HCC): ICD-10-CM

## 2022-09-13 DIAGNOSIS — E11.42 DIABETIC PERIPHERAL NEUROPATHY (HCC): Primary | ICD-10-CM

## 2022-09-13 DIAGNOSIS — I10 ESSENTIAL HYPERTENSION: ICD-10-CM

## 2022-09-13 DIAGNOSIS — Z79.4 TYPE 2 DIABETES MELLITUS WITH HYPERGLYCEMIA, WITH LONG-TERM CURRENT USE OF INSULIN (HCC): ICD-10-CM

## 2022-09-13 DIAGNOSIS — E11.65 TYPE 2 DIABETES MELLITUS WITH HYPERGLYCEMIA, WITH LONG-TERM CURRENT USE OF INSULIN (HCC): ICD-10-CM

## 2022-09-13 DIAGNOSIS — J86.9 EMPYEMA LUNG (HCC): ICD-10-CM

## 2022-09-13 DIAGNOSIS — I48.0 PAROXYSMAL ATRIAL FIBRILLATION (HCC): ICD-10-CM

## 2022-09-13 PROCEDURE — 99309 SBSQ NF CARE MODERATE MDM 30: CPT | Performed by: NURSE PRACTITIONER

## 2022-09-13 NOTE — PROGRESS NOTES
[facility]  [address]  [phone]  [place]  Code 31 (STR)      NAME: Kelli Red  AGE: 65 y o  SEX: female CODE STATUS: CPR    DATE OF ENCOUNTER: 9/13/2022    Assessment and Plan     1  Diabetic peripheral neuropathy [facility])  Assessment & Plan:    Lab Results   Component Value Date    HGBA1C 8 1 (H) 08/24/2022     Stable   Denies any issues on exam  Continue Lyrica       2  Type 2 diabetes mellitus with hyperglycemia, with long-term current use of insulin [facility])  Assessment & Plan:    Lab Results   Component Value Date    HGBA1C 8 1 (H) 08/24/2022     Needs better A1c control     FBS ranging 180-220  Post Prandial ranging 200-250    Continue Glargine 30 units Q HS  HumaLog 10 units TID with meals     Recheck HgbA1c around 11/25/2022      3  Empyema lung, Right  Assessment & Plan:  S/p VATS 8/6/22  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   Doing well on Trach Collar with FiO2 35%  Continue OP f/u with Pulmonology   Recommend OP f/u with ENT for trach management       4  Paroxysmal atrial fibrillation (HCC)  Assessment & Plan:  · Controlled on exam  · Continue Metoprolol and Amiodarone for rate control  · Continue Brillinta for [facility] as well as Pradaxa       5  Coronary artery disease involving native coronary artery of native heart without angina pectoris  Assessment & Plan:  · Hx of STEMI in October 22nd 2021 s/p PATRICA to mid cercumflex OM 1, OM 2 ballooned - no stent  · Pulseless Vtach on 10/27/2021: Repeat LHC 90% mid cercumflex stenosis- could not be intervened during cardiac cath  · VTach again on 1028/21: found to have apical LAD with complete occlusion - thought to be too small for intervention  · Cardiac Arrest on 1/1/22- No cardiac cath done at [facility] due to being hypoxic vs VT induced   · Had LifeVest   · Continue Metoprolol, Brilinta, Lipitor   · OP f/u with Cardiology       6   Chronic systolic CHF (congestive heart failure) (HCC)  Assessment & Plan:  Wt Readings from Last 3 Encounters:   09/13/22 83 5 kg (184 lb)   09/08/22 83 5 kg (184 lb)   08/30/22 85 kg (187 lb 6 3 oz)       Weights stable  Euvolemic on exam  Denies any cardiopulmonary symptoms   Continue Weekly weights   Cardiac diet  Continue Lasix 40 mg BID  Continue Beta Blocker         7  Deep vein thrombosis (DVT) of calf muscle vein of left lower extremity, unspecified chronicity (HCC)  Assessment & Plan:  · Chronic vs Subacute while on Eliquis  · Changed to Pradaxa due to considered eliquis failure   · Hematology f/u as OP in 1-2 months       8  Essential hypertension  Assessment & Plan:  · Stable on exam   · Continue Amlodipine 5 mg Daily, Lasix 40 mg BID, Metoprolol Succ 50 mg Daily       9  Ambulatory dysfunction  Assessment & Plan:   Multifactorial   Continue PT/OT   Fall Precautions   Ensure adequate nutrition/hydration     following for d/c planning     · Will transition to LTC at SNF this week  · Patient states she wants to continue to work on finding housing but needs to stay at SNF until then            All medications and routine orders were reviewed and updated as needed  Chief Complaint     STR follow up visit    Past Medical and Surgica History      Past Medical History:   Diagnosis Date    Anxiety     Aortic aneurysm ([facility] )     Arthritis     Depression     Diabetes mellitus ([facility] )     Epigastric pain 7/2/2019    Fibromyalgia     GERD (gastroesophageal reflux disease)     GERD (gastroesophageal reflux disease)     H/O cardiovascular stress test 09/2018    no ischemia  EF 70%   H/O echocardiogram 01/2019    EF 60%  Mild LVH  trivial effusion      Hemoptysis 7/17/2022    Hyperlipidemia     Hypertension     Hypoestrogenism 7/26/2017    Hypokalemia 8/11/2018    Hypomagnesemia 6/28/2022    Hyponatremia 7/19/2022    Lung bullae ([facility] ) 1/18/2022    Migraines     Pressure injury of sacral region, stage 3 ([facility] ) 8/31/2022    Psychiatric disorder anxiety    Uncontrolled hypertension 2015    Last Assessment & Plan:  BP today above goal <140/90, apparently asymptomatic  Prior BP elevations were attributed to not taking medication  Consider increased medication if persistent on f/u  Past Surgical History:   Procedure Laterality Date    BACK SURGERY      Lumbar epidural steroid injection    CARDIAC CATHETERIZATION N/A 10/22/2021    Procedure: Cardiac pci;  Surgeon: Regino Ram MD;  [facility]; Service: Cardiology    CARDIAC CATHETERIZATION  10/22/2021    Procedure: Cardiac catheterization;  Surgeon: Regino Ram MD;  [facility]; Service: Cardiology    CARDIAC CATHETERIZATION Left 10/27/2021    Procedure: Cardiac Left Heart Cath;  Surgeon: Chika Keith MD;  [facility]; Service: Cardiology    CARDIAC CATHETERIZATION N/A 10/27/2021    Procedure: Cardiac pci;  Surgeon: Chika Keith MD;  [facility]; Service: Cardiology    CARDIAC CATHETERIZATION N/A 10/28/2021    Procedure: Cardiac pci;  Surgeon: Tayler Roberson DO;  [facility];   Service: Cardiology    CARPAL TUNNEL RELEASE Left      SECTION      CHOLECYSTECTOMY      COLONOSCOPY      incomplete    COLONOSCOPY      EYE SURGERY      HYSTERECTOMY      Total    IR CHEST TUBE PLACEMENT  2022    IR CHEST TUBE PLACEMENT  2022    IR CHEST TUBE PLACEMENT  2022    LUNG DECORTICATION Right 2022    Procedure: DECORTICATION LUNG;  Surgeon: Damian Saldana MD;  Location: [facility] MAIN [facility];  Service: Thoracic    OVARIAN CYST REMOVAL      PEG W/TRACHEOSTOMY PLACEMENT N/A 2021    Procedure: TRACHEOSTOMY WITH INSERTION PEG TUBE;  Surgeon: Guillermo Fung DO;  Location: [facility] MAIN [facility];  Service: General    THORACOSCOPY VIDEO ASSISTED SURGERY (VATS) Right 2022    Procedure: THORACOSCOPY VIDEO ASSISTED SURGERY (VATS), RIGHT;  Surgeon: Damian Saldana MD; Location: [facility];  Service: Thoracic    TUBAL LIGATION      UPPER GASTROINTESTINAL ENDOSCOPY       Allergies   Allergen Reactions    Metformin Diarrhea    Clonidine Rash     Patch           History of Present Illness     Dejuan Smith is a 65year old female admitted to [facility] on 8/18/22 for STR  Past Medical Hx including but not limited to Chronic Hypoxic Resp Failure, Trach dependent on 6 L NC, CHANTALE, AFIB on Eliquis, CAD s/p cardiac arrest s/p PCI on Brilinta, HTN, CVA, CHF (EF 50%), DM2 with neuropathy, GERD, CKD3,  seen in collaboration with nursing for medical mgmt and follow-up visit  Patient will transition to long-term care at [facility] this week  [facility] will continue to follow for routine visits  Hospital Course:   Presented to [facility] 8/2/2022 as a direct transfer from [facility] for cardiothoracic surgery eval for possible  VATS procedure due to right lung empyema  Patient originally presented to [facility] on 07/19 with chest pain and shortness of breath she was found to have a spontaneous pneumothorax s/p chest tube placed in IR on 07/21 later upsized on 07/22  Patient developed severe pain on the right side of her chest tube insertion subsequently with removal on 07/24  Imaging revealed persistent pneumothorax and empyema and patient was transferred to [facility] for repeat chest tube insertion  Patient was evaluated by acute pain services provided peripheral block to help with her pain control  She was treated with IV vancomycin and cefepime as her wound and sputum cultures grew MRSA  ID was following for antibiotic regimen which she completed on 8/20/2022  Pulmonary also following who recommended  VATS procedure, this was done on 8/6/2022 due to her right lung empyema  Patient had apical chest tubes placed which were ventrally removed on 8/9/2022    Patient developed ABLA during procedure and required total of 5 units PRBCs throughout her hospital stay to maintain an optimum hemoglobin level  Of note patient also has history of CHF upon her initial admission  was treated with Bumex 2 mg b i d  and Aldactone 100 mg daily- unfortunately patient did have worsening renal function and these medications were held  Patient also had uncontrolled diabetes throughout her hospital stay, insulin was adjusted and eventually was discharged on Lantus 30 units q h s  and Humalog 5 units t i d  PT/OT recommended discharge to short-term rehab  Patient was discharged to [facility] for short-term rehab  Her rehab was complicated due to acute onset respiratory distress with hypoxia  Patient eventually required ED transfer and was admitted to [facility] 8/24/2022 through 8/30/2022  Patient did test positive for COVID-19 on 8/24/2022, Patient had thick sputum, She remained afebrile  Patient was requiring her baseline O2 requirements a of 8-10 liters via trach collar saturating in the high 90s  Respiratory Symptoms were unlikely related to COVID due to her resolution of symptoms in a timely manner  Patient was however treated on a mild COVID pathway with remdesivir and dexamethasone  BNP on admission 8400  Chest x-ray on this admission revealed moderate pulmonary vascular congestion with a small right pleural effusion  Cardiology recommended transitioning her from Bumex to furosemide 40 mg b i d   Patient also found to have DVT of left soleus vein during this admission, elevated D-dimer 262  DVT is chronic versus subacute nonocclusive  This was in the setting of Eliquis use due to atrial fibrillation  Suspected Eliquis failure  Hematology recommending Pradaxa replacement for Eliquis in setting of Eliquis failure, switched to Pradaxa and previously on Brillinta for prior MI  She will need outpatient hematology evaluation in 1-2 months  Rehab:   Seen and examined at bedside today  Patient is a reliable historian    She is able to make her needs known, AAO X 3 on exam  She has a trach and can not speak but able to reach lips and uses pen/paper or whiteboard  Patient states her legs still feel weak but has been up and ambulating in room with RW, able to use bathroom independeltly  Patient states she is sleeping well, denies pain  States her breathing is improved  Her dry/itchy skin improved with addition of Sarna lotion  Patient offers no complaints on exam, she is thankful for all the staffs help  She is aware she will be staying LTC at facility, she states she is sad but understands this is what she needs to do at this time  She states she continues working on getting social security back but has been difficult due to no voice  Patient denies any SI/HI  Patient states she is eating well, hydrating, denies bowel or bladder issues  Patient is actively participating in therapy  No concerns from nursing at this time  The patient's allergies, past medical, surgical, social and family history were reviewed and unchanged  Review of Systems     Review of Systems   Constitutional: Negative for chills and fever  HENT: Negative for congestion, rhinorrhea and sore throat  Eyes: Negative for pain and visual disturbance  Respiratory: Negative for cough, shortness of breath and wheezing  Cardiovascular: Negative for chest pain and palpitations  Gastrointestinal: Negative for abdominal pain, constipation, diarrhea and nausea  Genitourinary: Negative for decreased urine volume, difficulty urinating, dysuria and hematuria  Musculoskeletal: Negative for arthralgias and back pain  Skin: Negative for color change and rash  C/o dry/itchy skin    Neurological: Positive for weakness  Negative for dizziness, syncope, numbness and headaches  Psychiatric/Behavioral: Negative for dysphoric mood and sleep disturbance  The patient is nervous/anxious            Objective     Vitals:   Vitals:    09/13/22 1136   BP: 142/82   Pulse: 62   Resp: 16   Temp: 98 2 °F (36 8 °C)   SpO2: 99%         Physical Exam  Vitals and nursing note reviewed  Constitutional:       General: She is not in acute distress  Appearance: Normal appearance  Comments: Ronelle Narrow in place with trach collar   HENT:      Head: Normocephalic and atraumatic  Nose: No congestion or rhinorrhea  Mouth/Throat:      Mouth: Mucous membranes are pale and dry  Eyes:      General: No scleral icterus  Conjunctiva/sclera: Conjunctivae normal       Pupils: Pupils are equal, round, and reactive to light  Neck:      Trachea: Tracheostomy present  No tracheal deviation  Cardiovascular:      Rate and Rhythm: Normal rate and regular rhythm  Pulses: Normal pulses  Heart sounds: Normal heart sounds  No murmur heard  Pulmonary:      Effort: Pulmonary effort is normal  No respiratory distress  Breath sounds: Decreased breath sounds present  No wheezing, rhonchi or rales  Abdominal:      General: Bowel sounds are normal  There is no distension  Palpations: Abdomen is soft  There is no mass  Tenderness: There is no abdominal tenderness  Hernia: No hernia is present  Musculoskeletal:         General: No swelling or tenderness  Cervical back: Full passive range of motion without pain and normal range of motion  Lymphadenopathy:      Cervical: No cervical adenopathy  Skin:     General: Skin is warm and dry  Coloration: Skin is not pale  Findings: No rash  Neurological:      General: No focal deficit present  Mental Status: She is alert and oriented to person, place, and time  Mental status is at baseline  GCS: GCS eye subscore is 4  GCS verbal subscore is 5  GCS motor subscore is 6  Motor: Weakness present  Gait: Gait normal    Psychiatric:         Attention and Perception: Attention normal          Mood and Affect: Mood normal          Behavior: Behavior normal  Behavior is cooperative  Pertinent Laboratory/Diagnostic Studies:   Reviewed in facility chart-stable      Current Medications   Medications reviewed and updated see facility [facility] for details        Current Outpatient Medications:     acetaminophen (TYLENOL) 325 mg tablet, Take 3 tablets (975 mg total) by mouth every 8 (eight) hours, Disp: 30 tablet, Rfl: 0    albuterol (2 5 mg/3 mL) 0 083 % nebulizer solution, Take 3 mL (2 5 mg total) by nebulization every 4 (four) hours as needed for wheezing or shortness of breath, Disp: 180 mL, Rfl: 5    amiodarone 100 mg tablet, TAKE 1 TABLET BY MOUTH DAILY WITH BREAKFAST , Disp: 90 tablet, Rfl: 0    amLODIPine (NORVASC) 5 mg tablet, Take 1 tablet (5 mg total) by mouth daily, Disp: , Rfl: 0    atorvastatin (LIPITOR) 40 mg tablet, Take 1 tablet (40 mg total) by mouth daily with dinner, Disp: 30 tablet, Rfl: 0    benzonatate (TESSALON PERLES) 100 mg capsule, Take 1 capsule (100 mg total) by mouth 3 (three) times a day as needed for cough, Disp: 20 capsule, Rfl: 0    camphor-menthol (SARNA) lotion, Apply 1 application topically 2 (two) times a day For dry/itchy skin, Disp: , Rfl:     dabigatran etexilate (PRADAXA) 150 mg capsu, Take 1 capsule (150 mg total) by mouth every 12 (twelve) hours, Disp: , Rfl: 0    escitalopram (LEXAPRO) 10 mg tablet, TAKE 1 TABLET BY MOUTH EVERY DAY, Disp: 90 tablet, Rfl: 1    ferrous sulfate 325 (65 Fe) mg tablet, Take 1 tablet (325 mg total) by mouth daily with breakfast, Disp: 30 tablet, Rfl: 0    furosemide (LASIX) 40 mg tablet, Take 1 tablet (40 mg total) by mouth 2 (two) times a day, Disp: , Rfl: 0    hydrOXYzine HCL (ATARAX) 25 mg tablet, Take 1 tablet (25 mg total) by mouth every 6 (six) hours as needed for anxiety, Disp: 30 tablet, Rfl: 0    insulin glargine (LANTUS) 100 units/mL subcutaneous injection, Inject 30 Units under the skin daily at bedtime, Disp: 10 mL, Rfl: 0    insulin lispro (HumaLOG KwikPen) 100 units/mL injection pen, Inject 10 Units under the skin 3 (three) times a day with meals, Disp: 15 mL, Rfl: 0    ipratropium (ATROVENT) 0 02 % nebulizer solution, Take 2 5 mL (0 5 mg total) by nebulization 3 (three) times a day, Disp: , Rfl: 0    levalbuterol (XOPENEX) 1 25 mg/3 mL nebulizer solution, Take 1 25 mg by nebulization 3 (three) times a day SOB/Wheezing, Disp: , Rfl:     lidocaine (LIDODERM) 5 %, Apply 2 patches topically daily Remove & Discard patch within 12 hours or as directed by MD, Disp: 30 patch, Rfl: 0    LORazepam (Ativan) 1 mg tablet, Take 0 5 tablets (0 5 mg total) by mouth every 8 (eight) hours as needed for anxiety or sleep, Disp: 30 tablet, Rfl: 0    metoprolol succinate (TOPROL-XL) 50 mg 24 hr tablet, Take 1 tablet (50 mg total) by mouth every 12 (twelve) hours, Disp: 180 tablet, Rfl: 3    mirtazapine (REMERON) 7 5 MG tablet, Take 1 tablet (7 5 mg total) by mouth daily at bedtime, Disp: 30 tablet, Rfl: 0    multivitamin-minerals therapeutic (THERA M PLUS), Take 1 tablet by mouth in the morning, Disp: , Rfl:     omeprazole (PriLOSEC) 20 mg delayed release capsule, Take 20 mg by mouth daily, Disp: , Rfl:     polyethylene glycol (MIRALAX) 17 g packet, Take 17 g by mouth daily, Disp: , Rfl: 0    pregabalin (LYRICA) 75 mg capsule, TAKE ONE CAPSULE EVERY DAY, Disp: 90 capsule, Rfl: 1    ticagrelor (BRILINTA) 90 MG, Take 1 tablet (90 mg total) by mouth every 12 (twelve) hours, Disp: 30 tablet, Rfl: 0     Plan discussed with Dr Erasmo Kam noted agreement with assessment and plan  Please note:  Voice-recognition software may have been used in the preparation of this document  Occasional wrong word or "sound-alike" substitutions may have occurred due to the inherent limitations of voice recognition software  Interpretation should be guided by context           NEELAM Gibbs  9/13/2022  11:19 AM

## 2022-09-20 NOTE — ASSESSMENT & PLAN NOTE
Lab Results   Component Value Date    HGBA1C 8 1 (H) 08/24/2022     Stable   Denies any issues on exam  Continue Lyrica

## 2022-09-20 NOTE — ASSESSMENT & PLAN NOTE
· Hx of STEMI in October 22nd 2021 s/p PATRICA to mid cercumflex OM 1, OM 2 ballooned - no stent  · Pulseless Vtach on 10/27/2021: Repeat LHC 90% mid cercumflex stenosis- could not be intervened during cardiac cath  · VTach again on 1028/21: found to have apical LAD with complete occlusion - thought to be too small for intervention  · Cardiac Arrest on 1/1/22- No cardiac cath done at [facility] due to being hypoxic vs VT induced   · Had LifeVest   · Continue Metoprolol, Brilinta, Lipitor   · OP f/u with Cardiology

## 2022-09-20 NOTE — ASSESSMENT & PLAN NOTE
· Controlled on exam  · Continue Metoprolol and Amiodarone for rate control  · Continue Brillinta for [facility] as well as Pradaxa

## 2022-09-20 NOTE — ASSESSMENT & PLAN NOTE
 Multifactorial   Continue PT/OT   Fall Precautions   Ensure adequate nutrition/hydration     following for d/c planning     · Will transition to LTC at SNF this week  · Patient states she wants to continue to work on finding housing but needs to stay at [facility] until then

## 2022-09-20 NOTE — ASSESSMENT & PLAN NOTE
· Chronic vs Subacute while on Eliquis  · Changed to Pradaxa due to considered eliquis failure   · Hematology f/u as OP in 1-2 months

## 2022-09-22 ENCOUNTER — PATIENT OUTREACH (OUTPATIENT)
Dept: FAMILY MEDICINE CLINIC | Facility: CLINIC | Age: 57
End: 2022-09-22

## 2022-09-22 DIAGNOSIS — I48.91 ATRIAL FIBRILLATION, UNSPECIFIED TYPE (HCC): ICD-10-CM

## 2022-09-22 NOTE — PROGRESS NOTES
Chart review done  Notes from STR reviewed  Pt is currently at [address] for STR but will transition to LTC  It is noted that patient will continue to reside at [address] until she can find housing  RN CM will keep patient on surveillance at this time as she is rising utlizer

## 2022-09-23 ENCOUNTER — OFFICE VISIT (OUTPATIENT)
Dept: PULMONOLOGY | Facility: CLINIC | Age: 57
End: 2022-09-23
Payer: COMMERCIAL

## 2022-09-23 VITALS
DIASTOLIC BLOOD PRESSURE: 84 MMHG | TEMPERATURE: 97.3 F | HEIGHT: 69 IN | SYSTOLIC BLOOD PRESSURE: 130 MMHG | OXYGEN SATURATION: 90 % | BODY MASS INDEX: 30.07 KG/M2 | HEART RATE: 76 BPM | WEIGHT: 203 LBS

## 2022-09-23 DIAGNOSIS — J43.2 CENTRILOBULAR EMPHYSEMA (HCC): ICD-10-CM

## 2022-09-23 DIAGNOSIS — R06.02 SHORTNESS OF BREATH: ICD-10-CM

## 2022-09-23 DIAGNOSIS — I50.9 CHRONIC CONGESTIVE HEART FAILURE, UNSPECIFIED HEART FAILURE TYPE (HCC): ICD-10-CM

## 2022-09-23 DIAGNOSIS — J38.6 SUBGLOTTIC STENOSIS: ICD-10-CM

## 2022-09-23 DIAGNOSIS — I50.22 CHRONIC SYSTOLIC CHF (CONGESTIVE HEART FAILURE) (HCC): ICD-10-CM

## 2022-09-23 DIAGNOSIS — U07.1 COVID-19: ICD-10-CM

## 2022-09-23 DIAGNOSIS — R06.89 RESPIRATORY INSUFFICIENCY: Primary | ICD-10-CM

## 2022-09-23 DIAGNOSIS — Z93.0 TRACHEOSTOMY IN PLACE (HCC): ICD-10-CM

## 2022-09-23 PROCEDURE — 99215 OFFICE O/P EST HI 40 MIN: CPT | Performed by: INTERNAL MEDICINE

## 2022-09-23 RX ORDER — AMIODARONE HYDROCHLORIDE 100 MG/1
TABLET ORAL
Qty: 90 TABLET | Refills: 0 | Status: SHIPPED | OUTPATIENT
Start: 2022-09-23 | End: 2022-10-27 | Stop reason: SDUPTHER

## 2022-09-23 NOTE — PROGRESS NOTES
Assessment/Plan:    Acute Respiratory insufficiency on chronic hypoxic respiratory failure  She has acute on chronic respiratory failure  She was recently admitted to [facility] and was discharged home on tracheostomy collar at 8 liters/minute  Her oxygen saturation was 90-91% on tracheostomy collar on room air/  She came to the office on room air and felt short of breath and was mildly tachypneic  We placed her on oxygen supplementation and her oxygen saturations improved to 96%  Reportedly she has been on oxygen supplementation in the nursing home  Titrate down FiO2 as tolerated  The transportation was arranged back to the nursing home with oxygen supplementation  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%   She had prolonged ventilation following cardiac arrest   It is deemed that she is not a candidate for decannulation at this time  She has a old type Shiley tracheostomy tube  Chronic systolic CHF (congestive heart failure) ([facility])  Wt Readings from Last 3 Encounters:   09/23/22 92 1 kg (203 lb)   09/13/22 83 5 kg (184 lb)   09/08/22 83 5 kg (184 lb)      She has history of chronic systolic and diastolic heart failure and has been on diuretic therapy with bumetanide and spironolactone   She also has history of cardiac arrhythmias and was on LifeVest    She has history of atrial fibrillation and has been on amiodarone   She is also on systemic anticoagulation with Eliquis  COPD (chronic obstructive pulmonary disease) ([facility] )  She was a smoker in the past  [place] previous CT scan had shown evidence of structural emphysema   She has no previous PFTs   Recommend nebulized bronchodilator with DuoNeb p r n  Ratna Terry has minimal secretions         Diagnoses and all orders for this visit:    Acute Respiratory insufficiency on chronic hypoxic respiratory failure    Subglottic stenosis    Chronic systolic CHF (congestive heart failure) ([facility] )    Centrilobular emphysema ([facility] )    Chronic congestive heart failure, unspecified heart failure type [facility])  -     Ambulatory Referral to Pulmonology    Tracheostomy in place [facility])  -     Ambulatory Referral to Pulmonology    Shortness of breath  -     Ambulatory Referral to Pulmonology    COVID-19  -     CT chest without contrast; Future          Subjective:      Patient ID: Toño Gallegos is a 65 y o  female  Ms Kelli Red as subglottic stenosis and is status post tracheostomy  She has a 7 5 size in a tube uncuffed tracheostomy tube in place  He is able to take orally  She also has chronic heart failure  She was a smoker in the past and possibly has COPD/emphysema  She has been on nebulized ipratropium and albuterol  She was recently admitted to [facility] with acute on chronic respiratory failure secondary to COVID pneumonia  Currently she has been sent to the office for follow-up  Her imaging had shown bilateral lower lobe consolidation and pulmonary vascular congestion previously  She was discharged home on 8 liters/minute of oxygen supplementation  She came to the office today on room air and felt tachypneic and her oxygen saturation was 90-91%  We placed her on supplemental oxygen  I will better after that and oxygen improved to 96%  She has shortness of breath on exertion she has occasional cough when she brings up occasional yellow phlegm  No wheezing  No chest pain or palpitations  He has bilateral leg edema  She is on systemic anticoagulation with Pradaxa for DVT  She cannot talk  She denied any fever or chills  Her appetite has been normal   I reviewed her chest x-ray and CT scan films during recent admission  She needs to continue on oxygen  Her oxygen can be titrated down nights tolerated for saturating 94% she  She has history of anxiety and has been on Ativan    This should be used only as an as-needed basis after evaluation by primary team   She was a smoker in the past       The following portions of the patient's history were reviewed and updated as appropriate: allergies, current medications, past family history, past medical history, past social history, past surgical history and problem list     Review of Systems   Constitutional: Negative for appetite change, chills, fatigue and fever  HENT: Negative for hearing loss, rhinorrhea, sneezing, sore throat and trouble swallowing  Eyes: Negative for visual disturbance  Respiratory: Positive for cough and shortness of breath  Negative for chest tightness and wheezing  Cardiovascular: Positive for leg swelling  Negative for chest pain and palpitations  Gastrointestinal: Negative for abdominal pain, constipation, diarrhea, nausea and vomiting  Genitourinary: Negative for dysuria, frequency and urgency  Musculoskeletal: Positive for gait problem (uses wheel chair)  Negative for arthralgias  Skin: Negative for rash  Allergic/Immunologic: Negative for environmental allergies  Neurological: Negative for dizziness, syncope and headaches  Psychiatric/Behavioral: Positive for sleep disturbance  Negative for agitation and confusion  The patient is nervous/anxious  Objective:      /84 (BP Location: Left arm, Patient Position: Sitting, Cuff Size: Adult)   Pulse 76   Temp (!) 97 3 °F (36 3 °C) (Tympanic)   Ht 5' 9" (1 753 m)   Wt 92 1 kg (203 lb)   SpO2 90%   BMI 29 98 kg/m²          Physical Exam  Vitals reviewed  Constitutional:       General: She is not in acute distress  Appearance: She is not ill-appearing, toxic-appearing or diaphoretic  HENT:      Head: Normocephalic  Mouth/Throat:      Mouth: Mucous membranes are moist    Eyes:      General: No scleral icterus  Conjunctiva/sclera: Conjunctivae normal    Neck:      Comments: Tracheostomy in place  On cuffed  7 5 size inner cannula  Site looks okay  Cardiovascular:      Rate and Rhythm: Normal rate and regular rhythm        Heart sounds: Normal heart sounds  No murmur heard  Pulmonary:      Effort: Pulmonary effort is normal  No respiratory distress  Breath sounds: No stridor  Rales (bibasilar coarse crackles) present  No wheezing or rhonchi  Chest:      Chest wall: No tenderness  Abdominal:      General: Bowel sounds are normal       Palpations: Abdomen is soft  Tenderness: There is no abdominal tenderness  There is no guarding  Musculoskeletal:      Cervical back: No rigidity  Right lower leg: Edema present  Left lower leg: Edema present  Lymphadenopathy:      Cervical: No cervical adenopathy  Skin:     Coloration: Skin is not jaundiced or pale  Findings: No rash  Neurological:      Mental Status: She is alert and oriented to person, place, and time  Gait: Gait abnormal    Psychiatric:         Mood and Affect: Mood normal          Behavior: Behavior normal          Thought Content: Thought content normal          Judgment: Judgment normal       Comments: anxious       I spent 40 minutes of time taking care of this patient with multiple complex pulmonary issues who developed acute on chronic respiratory failure while in the office  The majority of this time was spent directly with the patient monitoring,  counseling as well as coordinating care

## 2022-09-23 NOTE — ASSESSMENT & PLAN NOTE
She has acute on chronic respiratory failure  She was recently admitted to [facility] and was discharged home on tracheostomy collar at 8 liters/minute  Her oxygen saturation was 90-91% on tracheostomy collar on room air/  She came to the office on room air and felt short of breath and was mildly tachypneic  We placed her on oxygen supplementation and her oxygen saturations improved to 96%  Reportedly she has been on oxygen supplementation in the nursing home  Titrate down FiO2 as tolerated  The transportation was arranged back to the nursing home with oxygen supplementation

## 2022-09-25 PROBLEM — J44.9 COPD (CHRONIC OBSTRUCTIVE PULMONARY DISEASE) (HCC): Status: ACTIVE | Noted: 2022-09-25

## 2022-09-26 ENCOUNTER — OFFICE VISIT (OUTPATIENT)
Dept: HEMATOLOGY ONCOLOGY | Facility: CLINIC | Age: 57
End: 2022-09-26
Payer: COMMERCIAL

## 2022-09-26 ENCOUNTER — TELEPHONE (OUTPATIENT)
Dept: HEMATOLOGY ONCOLOGY | Facility: CLINIC | Age: 57
End: 2022-09-26

## 2022-09-26 VITALS
HEART RATE: 62 BPM | WEIGHT: 203 LBS | BODY MASS INDEX: 30.07 KG/M2 | DIASTOLIC BLOOD PRESSURE: 70 MMHG | SYSTOLIC BLOOD PRESSURE: 116 MMHG | RESPIRATION RATE: 18 BRPM | HEIGHT: 69 IN | TEMPERATURE: 96.9 F | OXYGEN SATURATION: 99 %

## 2022-09-26 DIAGNOSIS — Z79.01 CURRENT USE OF LONG TERM ANTICOAGULATION: ICD-10-CM

## 2022-09-26 DIAGNOSIS — M79.89 LEG SWELLING: ICD-10-CM

## 2022-09-26 DIAGNOSIS — R59.9 ENLARGED LYMPH NODE: Primary | ICD-10-CM

## 2022-09-26 DIAGNOSIS — R93.89 ABNORMAL FINDING ON IMAGING: ICD-10-CM

## 2022-09-26 DIAGNOSIS — I82.462 DEEP VEIN THROMBOSIS (DVT) OF CALF MUSCLE VEIN OF LEFT LOWER EXTREMITY, UNSPECIFIED CHRONICITY (HCC): ICD-10-CM

## 2022-09-26 PROCEDURE — 99214 OFFICE O/P EST MOD 30 MIN: CPT

## 2022-09-26 NOTE — ASSESSMENT & PLAN NOTE
She was a smoker in the past  [facility] previous CT scan had shown evidence of structural emphysema   She has no previous PFTs   Recommend nebulized bronchodilator with DuoNeb p r n  Andi Shannon has minimal secretions

## 2022-09-26 NOTE — TELEPHONE ENCOUNTER
Appointment Confirmation (to confirm pre existing appointments - ONLY)  No need to route   Appointment with  Edel Mehta   Appointment date & time 12/28/22 @ 11am   Location [place]   Patient verbilized Understanding

## 2022-09-26 NOTE — TELEPHONE ENCOUNTER
Patient needed  scheduling for STAT testing  I called [place], where she resides and explained that she needed to come tomorrow for the STAT and on Friday for additional testing

## 2022-09-26 NOTE — ASSESSMENT & PLAN NOTE
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

## 2022-09-26 NOTE — PROGRESS NOTES
[address] [facility] [address]  Hematology Ambulatory Consult  Candi Marrufo, 1965, [id number]  9/26/2022    Assessment/Plan:  1  Deep vein thrombosis (DVT) of calf muscle vein of left lower extremity, unspecified chronicity (HCC)  2  Leg swelling  3  Current use of long term anticoagulation  Ms Francia Robert is a 67-year-old female seen in hospital follow-up for anticoagulation after being diagnosed with left lower extremity DVT on Eliquis  She had been on Eliquis for AFib and followed with Cardiology it was decided upon discharge to use Pradaxa moving forward  She is tolerating this well  She has multiple comorbidities including wheelchair-bound residing at a nursing facility, obesity, and tracheostomy placement  Due to these non modifiable risk factors lifelong anticoagulation is recommended  This is likely an Eliquis failure as she had been taking Eliquis for over a year but could not be certain as the clot appears to be chronic and she had no symptoms so there is unclear of the timing of onset  She has been tolerating Pradaxa well and I am in agreement of maintaining on Pradaxa 150 mg every 12 hours for anticoagulation  If she were to develop a new or clot propagation on Pradaxa, Coumadin would be recommended  She will continue to follow with cardiology for this as well  Her leg swelling has not resolved and is now on both legs  I have ordered repeat venous duplex to be completed asap to assess for new DVT or clot propagation  For completeness of workup he malignancies should be ruled out  She has a PCP at the nursing facility and needs to be scheduled for mammogram and Pap smear  She is up-to-date on colonoscopy  - VAS lower limb venous duplex study, complete bilateral; Future  - CBC and differential; Future  - Comprehensive metabolic panel; Future    4  Enlarged lymph node  5   Abnormal finding on imaging  An enlarged lymph node of 2 7 cm was noted on previous venous duplex  Is not specified which side this was on  I have ordered a repeat ultrasound of the inguinal area to assess  We discussed that there may be a biopsy necessary to determine the cause of the enlarged lymph node  We also discussed that she this may be reactive to the clot and will assess for resolution     - US groin/inguinal area; Future    The patient is scheduled for follow-up in approximately 4 months  Patient voiced agreement and understanding to the above  Patient knows to call the Hematology/Oncology office with any questions and concerns regarding the above  Barrier(s) to care: None  The patient is able to self care     -------------------------------------------------------------------------------------------------------    Chief Complaint   Patient presents with    Follow-up       Referring provider:  No referring provider defined for this encounter  History of present illness:   Sonjia Humphrey old female past medical history of GERD, type 2 diabetes, hyperlipidemia, obstructive sleep apnea, subglottic stenosis status post tracheostomy placement, COPD, hypertension, AFib on anticoagulation, CAD, CHF, and CKD  She is seen as a hospital follow-up for anticoagulation management after acute DVT  She was seen by Hematology during her hospitalization for acute onset shortness of breath and was found to have a left lower extremity focal chronic versus subacute nonocclusive DVT in the soleal veins of the mid calf  It is unclear of the timing of symptoms or any provoking incidences  The patient did have COVID in May and had a long hospitalization with this  Unclear if this was an Eliquis failure as this is likely a chronic VTE and the patient had been taking Eliquis for over a year for AFib  It was discussed with Cardiology upon discharge to transition the patient to Lovenox, Pradaxa, Arixtra or Coumadin    She was ultimately discharged on Pradaxa  She is tolerating this well with no excess bleeding or bruising  She has continued swelling that is not improving or getting worse  It is now also on both legs  Original venous duplex also noted a 2 7 cm echogenic structure in the inguinal region, did not specify which side, and likely to be an enlarged lymph node  This was not worked up any further  She does not feel any lymphadenopathy on exam   She denies personal or family history of VTE  She is chronically in a wheelchair at a nursing facility  She has no personal history of malignancy but is not up-to-date on mammogram or Pap smears  She is up-to-date on colonoscopy screening  She is a previous smoker but does not smoke currently  Review of Systems   Constitutional: Negative for activity change, appetite change, fatigue, fever and unexpected weight change  HENT: Negative for trouble swallowing and voice change  Eyes: Negative for photophobia and visual disturbance  Respiratory: Negative for cough, chest tightness and shortness of breath  Cardiovascular: Positive for leg swelling (b/l )  Negative for chest pain and palpitations  Gastrointestinal: Negative for abdominal distention, anal bleeding, blood in stool, constipation, diarrhea, nausea and vomiting  Endocrine: Negative for cold intolerance and heat intolerance  Genitourinary: Negative for dysuria, hematuria and urgency  Musculoskeletal: Negative for arthralgias, back pain, gait problem, joint swelling and myalgias  Skin: Negative for pallor and rash  Neurological: Negative for dizziness, tremors, weakness, light-headedness, numbness and headaches  Hematological: Negative for adenopathy  Does not bruise/bleed easily  Psychiatric/Behavioral: Negative for confusion and sleep disturbance         Patient Active Problem List   Diagnosis    Anxiety    Cardiac murmur    Chronic pain disorder    [facility], recurrent [facility]    Diabetic peripheral neuropathy (HCC)    Fibromyalgia    Gastroesophageal reflux disease with esophagitis    Hemangioma of bone    Hyperlipidemia associated with type 2 diabetes mellitus ([facility] )    Essential hypertension    Low back pain    Lumbar radiculopathy    Medically complex patient    Non compliance with medical treatment    Noncompliance with diet and medication regimen    Overweight    Primary insomnia    Sciatica of left side    Thoracic radiculitis    Type 2 diabetes mellitus with hyperglycemia ([facility])    Vertebral body hemangioma    Vertigo    CHANTALE (obstructive sleep apnea)    Cellulitis of left lower extremity    History of Ventricular tachycardia ([facility])    Atrial fibrillation ([facility] )    History of Cardiac arrest ([facility])    Cerebral vascular accident ([facility] )    Cerebral aneurysm    CAD (coronary artery disease)    Subglottic stenosis    Tracheostomy in place [facility])    Respiratory failure with hypoxia ([place] [address] )    Insomnia secondary to anxiety    Anemia    COVID-19    Thoracic aortic aneurysm, ruptured ([facility])    Moderate protein-calorie malnutrition ([facility])    Chronic kidney disease    Acute Respiratory insufficiency on chronic hypoxic respiratory failure    Empyema lung, Right    Chest wall wound infection    Chronic systolic CHF (congestive heart failure) ([facility])    DVT (deep venous thrombosis) ([facility])    Ambulatory dysfunction    Physical deconditioning    H/O heart artery stent    Ischemic cardiomyopathy    Xeroderma    COPD (chronic obstructive pulmonary disease) ([facility])       Past Medical History:   Diagnosis Date    Anxiety     Aortic aneurysm ([facility])     Arthritis     Depression     Diabetes mellitus ([facility] )     Epigastric pain 7/2/2019    Fibromyalgia     GERD (gastroesophageal reflux disease)     GERD (gastroesophageal reflux disease)     H/O cardiovascular stress test 09/2018    no ischemia  EF 70%   H/O echocardiogram 01/2019    EF 60%  Mild LVH  trivial effusion      Hemoptysis 2022    Hyperlipidemia     Hypertension     Hypoestrogenism 2017    Hypokalemia 2018    Hypomagnesemia 2022    Hyponatremia 2022    Lung bullae ([facility] ) 2022    Migraines     Pressure injury of sacral region, stage 3 ([facility] ) 2022    Psychiatric disorder     anxiety    Uncontrolled hypertension 2015    Last Assessment & Plan:  BP today above goal <140/90, apparently asymptomatic  Prior BP elevations were attributed to not taking medication  Consider increased medication if persistent on f/u  Past Surgical History:   Procedure Laterality Date    BACK SURGERY      Lumbar epidural steroid injection    CARDIAC CATHETERIZATION N/A 10/22/2021    Procedure: Cardiac pci;  Surgeon: Saturnino Yi MD;  [facility]; Service: Cardiology    CARDIAC CATHETERIZATION  10/22/2021    Procedure: Cardiac catheterization;  Surgeon: Saturnino Yi MD;  [facility]; Service: Cardiology    CARDIAC CATHETERIZATION Left 10/27/2021    Procedure: Cardiac Left Heart Cath;  Surgeon: Khoi Ballesteros MD;  [facility]; Service: Cardiology    CARDIAC CATHETERIZATION N/A 10/27/2021    Procedure: Cardiac pci;  Surgeon: Khoi Ballesteros MD;  [facility]; Service: Cardiology    CARDIAC CATHETERIZATION N/A 10/28/2021    Procedure: Cardiac pci;  Surgeon: Carie Eid DO;  [facility];   Service: Cardiology    CARPAL TUNNEL RELEASE Left      SECTION      CHOLECYSTECTOMY      COLONOSCOPY      incomplete    COLONOSCOPY      EYE SURGERY      HYSTERECTOMY      Total    IR CHEST TUBE PLACEMENT  2022    IR CHEST TUBE PLACEMENT  2022    IR CHEST TUBE PLACEMENT  2022    LUNG DECORTICATION Right 2022    Procedure: DECORTICATION LUNG;  Surgeon: Korina Nuñez MD;  Location: [facility];  Service: Thoracic    OVARIAN CYST REMOVAL      PEG W/TRACHEOSTOMY PLACEMENT N/A 2021    Procedure: TRACHEOSTOMY WITH INSERTION PEG TUBE;  Surgeon: David Villatoro To, DO;  Location: [facility];  Service: General    THORACOSCOPY VIDEO ASSISTED SURGERY (VATS) Right 2022    Procedure: THORACOSCOPY VIDEO ASSISTED SURGERY (VATS), RIGHT;  Surgeon: Alexy Marie MD;  [facility];  Service: Thoracic    TUBAL LIGATION      UPPER GASTROINTESTINAL ENDOSCOPY         Family History   Problem Relation Age of Onset    Hypertension Mother    Michael Gonzalez Arthritis Mother     Diabetes Father     Other Sister         renal cell carcinoma    Diabetes Other     Factor V Leiden deficiency Other        Social History     Socioeconomic History    Marital status: Legally      Spouse name: Not on file    Number of children: Not on file    Years of education: Not on file    Highest education level: Not on file   Occupational History    Not on file   Tobacco Use    Smoking status: Former Smoker     Packs/day: 1 00     Years: 30 00     Pack years: 30 00     Types: Cigarettes    Smokeless tobacco: Never Used   Vaping Use    Vaping Use: Never used   Substance and Sexual Activity    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   Drug use: Yes     Types: Marijuana     Comment: medical; seldom use    Sexual activity: Yes     Birth control/protection: Female Sterilization     Comment: Monogamous relationship with monogamous partner   Other Topics Concern    Not on file   Social History Narrative    Most recent tobacco use screenin2019    Do you currently or have you served in the [employer]: No    Were you activated, into active duty, as a member of the [employer] or as a Reservist: No    Advance directive: No    Chewing tobacco: none    Alcohol intake: None    Marital status: Single    Live alone or with others: with others    Family history of heart disease:    aortic aneurysm    High cholesterol: Yes    High blood pressure:  Yes Exercise level: Heavy    Overweight: Yes    Obese: Yes    Diabetes: Yes    General stress level: High    Diet: Regular    Caffeine intake: Occasional    Guns present in home: No    Seat belts used routinely: Yes    Sexual orientation: Heterosexual    Sunscreen used routinely: No    Seat belt/car seat used routinely: Yes    Exercise-How often do you exercise: as tolerated    Do you have regular medical check ups: Yes    Do you have regular dental check ups: Yes    Illicit drugs-years of use:    recovery 23 years - HX of     Social Determinants of Health     Financial Resource Strain: Not on file   Food Insecurity: Food Insecurity Present    Worried About Running Out of Food in the Last Year: Sometimes true    Lela of Food in the Last Year: Sometimes true   Transportation Needs: No Transportation Needs    Lack of Transportation (Medical): No    Lack of Transportation (Non-Medical):  No   Physical Activity: Not on file   Stress: Not on file   Social Connections: Not on file   Intimate Partner Violence: Not on file   Housing Stability: High Risk    Unable to Pay for Housing in the Last Year: No    Number of Places Lived in the Last Year: 1    Unstable Housing in the Last Year: Yes         Current Outpatient Medications:     acetaminophen (TYLENOL) 325 mg tablet, Take 3 tablets (975 mg total) by mouth every 8 (eight) hours, Disp: 30 tablet, Rfl: 0    albuterol (2 5 mg/3 mL) 0 083 % nebulizer solution, Take 3 mL (2 5 mg total) by nebulization every 4 (four) hours as needed for wheezing or shortness of breath, Disp: 180 mL, Rfl: 5    amiodarone 100 mg tablet, TAKE 1 TABLET BY MOUTH EVERY DAY WITH BREAKFAST, Disp: 90 tablet, Rfl: 0    amLODIPine (NORVASC) 5 mg tablet, Take 1 tablet (5 mg total) by mouth daily, Disp: , Rfl: 0    atorvastatin (LIPITOR) 40 mg tablet, Take 1 tablet (40 mg total) by mouth daily with dinner, Disp: 30 tablet, Rfl: 0    benzonatate (TESSALON PERLES) 100 mg capsule, Take 1 capsule (100 mg total) by mouth 3 (three) times a day as needed for cough, Disp: 20 capsule, Rfl: 0    camphor-menthol (SARNA) lotion, Apply 1 application topically 2 (two) times a day For dry/itchy skin, Disp: , Rfl:     dabigatran etexilate (PRADAXA) 150 mg capsu, Take 1 capsule (150 mg total) by mouth every 12 (twelve) hours, Disp: , Rfl: 0    escitalopram (LEXAPRO) 10 mg tablet, TAKE 1 TABLET BY MOUTH EVERY DAY, Disp: 90 tablet, Rfl: 1    ferrous sulfate 325 (65 Fe) mg tablet, Take 1 tablet (325 mg total) by mouth daily with breakfast, Disp: 30 tablet, Rfl: 0    furosemide (LASIX) 40 mg tablet, Take 1 tablet (40 mg total) by mouth 2 (two) times a day, Disp: , Rfl: 0    hydrOXYzine HCL (ATARAX) 25 mg tablet, Take 1 tablet (25 mg total) by mouth every 6 (six) hours as needed for anxiety, Disp: 30 tablet, Rfl: 0    insulin glargine (LANTUS) 100 units/mL subcutaneous injection, Inject 30 Units under the skin daily at bedtime, Disp: 10 mL, Rfl: 0    insulin lispro (HumaLOG KwikPen) 100 units/mL injection pen, Inject 10 Units under the skin 3 (three) times a day with meals, Disp: 15 mL, Rfl: 0    ipratropium (ATROVENT) 0 02 % nebulizer solution, Take 2 5 mL (0 5 mg total) by nebulization 3 (three) times a day, Disp: , Rfl: 0    levalbuterol (XOPENEX) 1 25 mg/3 mL nebulizer solution, Take 1 25 mg by nebulization 3 (three) times a day SOB/Wheezing, Disp: , Rfl:     lidocaine (LIDODERM) 5 %, Apply 2 patches topically daily Remove & Discard patch within 12 hours or as directed by MD, Disp: 30 patch, Rfl: 0    LORazepam (Ativan) 1 mg tablet, Take 0 5 tablets (0 5 mg total) by mouth every 8 (eight) hours as needed for anxiety or sleep, Disp: 30 tablet, Rfl: 0    metoprolol succinate (TOPROL-XL) 50 mg 24 hr tablet, Take 1 tablet (50 mg total) by mouth every 12 (twelve) hours, Disp: 180 tablet, Rfl: 3    mirtazapine (REMERON) 7 5 MG tablet, Take 1 tablet (7 5 mg total) by mouth daily at bedtime, Disp: 30 tablet, Rfl: 0    multivitamin-minerals therapeutic (THERA M PLUS), Take 1 tablet by mouth in the morning, Disp: , Rfl:     omeprazole (PriLOSEC) 20 mg delayed release capsule, Take 20 mg by mouth daily, Disp: , Rfl:     polyethylene glycol (MIRALAX) 17 g packet, Take 17 g by mouth daily, Disp: , Rfl: 0    pregabalin (LYRICA) 75 mg capsule, TAKE ONE CAPSULE EVERY DAY, Disp: 90 capsule, Rfl: 1    ticagrelor (BRILINTA) 90 MG, Take 1 tablet (90 mg total) by mouth every 12 (twelve) hours, Disp: 30 tablet, Rfl: 0    Allergies   Allergen Reactions    Metformin Diarrhea    Clonidine Rash     Patch        Objective:  /70 (BP Location: Left arm)   Pulse 62   Temp (!) 96 9 °F (36 1 °C) (Tympanic)   Resp 18   Ht 5' 9" (1 753 m)   Wt 92 1 kg (203 lb)   SpO2 99%   BMI 29 98 kg/m²   Physical Exam  Constitutional:       General: She is not in acute distress  Appearance: Normal appearance  She is normal weight  She is not ill-appearing  HENT:      Head: Normocephalic and atraumatic  Eyes:      Extraocular Movements: Extraocular movements intact  Conjunctiva/sclera: Conjunctivae normal    Cardiovascular:      Rate and Rhythm: Normal rate and regular rhythm  Pulses: Normal pulses  Heart sounds: Normal heart sounds  No murmur heard  Pulmonary:      Effort: Pulmonary effort is normal  No respiratory distress  Breath sounds: Normal breath sounds  Abdominal:      General: Abdomen is flat  Bowel sounds are normal  There is no distension  Palpations: Abdomen is soft  Tenderness: There is no abdominal tenderness  Musculoskeletal:         General: Normal range of motion  Cervical back: Normal range of motion  No tenderness  Right lower leg: Edema present  Left lower leg: Edema present  Lymphadenopathy:      Cervical: No cervical adenopathy  Skin:     General: Skin is warm and dry  Capillary Refill: Capillary refill takes less than 2 seconds        Findings: No bruising or lesion  Neurological:      General: No focal deficit present  Mental Status: She is alert and oriented to person, place, and time  Mental status is at baseline  Motor: Weakness present  Gait: Gait abnormal (in a wheelchair)  Psychiatric:         Mood and Affect: Mood normal          Behavior: Behavior normal          Thought Content: Thought content normal          Judgment: Judgment normal          Result Review  Labs:   Lab Results   Component Value Date    WBC 12 46 (H) 2022    HGB 9 1 (L) 2022    HCT 28 8 (L) 2022    MCV 83 2022     (H) 2022     Lab Results   Component Value Date     2015    SODIUM 142 2022    K 3 7 2022     2022    CO2 31 2022    ANIONGAP 8 2015    AGAP 6 2022    BUN 29 (H) 2022    CREATININE 0 98 2022    GLUC 146 (H) 2022    GLUF 161 (H) 2022    CALCIUM 8 1 (L) 2022    AST 9 (L) 2022    ALT 9 2022    ALKPHOS 93 2022    PROT 7 7 2015    TP 6 4 2022    BILITOT 0 43 2015    TBILI 0 32 2022    EGFR 64 2022         Imagin/25/22: VAS lower limb venous duplex  RIGHT LOWER LIMB:  No evidence of acute or chronic deep vein thrombosis  No evidence of superficial thrombophlebitis noted  Doppler evaluation shows a normal response to augmentation maneuvers  Popliteal, posterior tibial and anterior tibial arterial Doppler waveforms are  triphasic/biphasic  LEFT LOWER LIMB:  There is evidence of focal chronic vs  subacute non-occlusive deep vein  thrombosis noted in the soleal veins at the mid calf  No evidence of superficial thrombophlebitis noted  Doppler evaluation shows a normal response to augmentation maneuvers  Popliteal, posterior tibial and anterior tibial arterial Doppler waveforms are  triphasic  Tech Note:  There is an echogenic structure located in the inguinal region   This structure  measures approximately 2 7 cm and is consistent with enlarged lymph node and  Channels  Please note: This report has been generated by a voice recognition software system  Therefore there may be syntax, spelling, and/or grammatical errors  Please call if you have any questions

## 2022-09-26 NOTE — ASSESSMENT & PLAN NOTE
Wt Readings from Last 3 Encounters:   09/23/22 92 1 kg (203 lb)   09/13/22 83 5 kg (184 lb)   09/08/22 83 5 kg (184 lb)      She has history of chronic systolic and diastolic heart failure and has been on diuretic therapy with bumetanide and spironolactone   She also has history of cardiac arrhythmias and was on LifeVest    She has history of atrial fibrillation and has been on amiodarone   She is also on systemic anticoagulation with Eliquis

## 2022-09-27 ENCOUNTER — HOSPITAL ENCOUNTER (OUTPATIENT)
Dept: NON INVASIVE DIAGNOSTICS | Facility: CLINIC | Age: 57
Discharge: HOME | End: 2022-09-27
Payer: COMMERCIAL

## 2022-09-27 DIAGNOSIS — I82.462 DEEP VEIN THROMBOSIS (DVT) OF CALF MUSCLE VEIN OF LEFT LOWER EXTREMITY, UNSPECIFIED CHRONICITY (HCC): ICD-10-CM

## 2022-09-27 DIAGNOSIS — M79.89 LEG SWELLING: ICD-10-CM

## 2022-09-27 LAB
MYCOBACTERIUM SPEC CULT: NORMAL
RHODAMINE-AURAMINE STN SPEC: NORMAL

## 2022-09-27 PROCEDURE — 93970 EXTREMITY STUDY: CPT

## 2022-09-27 PROCEDURE — 93970 EXTREMITY STUDY: CPT | Performed by: SURGERY

## 2022-09-29 ENCOUNTER — TELEPHONE (OUTPATIENT)
Dept: PULMONOLOGY | Facility: CLINIC | Age: 57
End: 2022-09-29

## 2022-09-29 NOTE — TELEPHONE ENCOUNTER
Ming Lopez from [address] [place] [address] called asking about a f/u appt for this pt, pt is due to come back in December for a 3 month f/u  I called Ming Lopez back and lm for her to call back to make an appt for the pt

## 2022-09-30 ENCOUNTER — HOSPITAL ENCOUNTER (OUTPATIENT)
Dept: ULTRASOUND IMAGING | Facility: HOSPITAL | Age: 57
Discharge: HOME | End: 2022-09-30
Payer: COMMERCIAL

## 2022-09-30 DIAGNOSIS — R59.9 ENLARGED LYMPH NODE: ICD-10-CM

## 2022-09-30 DIAGNOSIS — R93.89 ABNORMAL FINDING ON IMAGING: ICD-10-CM

## 2022-09-30 PROCEDURE — 76705 ECHO EXAM OF ABDOMEN: CPT

## 2022-10-05 ENCOUNTER — TELEPHONE (OUTPATIENT)
Dept: HEMATOLOGY ONCOLOGY | Facility: CLINIC | Age: 57
End: 2022-10-05

## 2022-10-05 DIAGNOSIS — R59.9 ENLARGED LYMPH NODE: Primary | ICD-10-CM

## 2022-10-05 NOTE — TELEPHONE ENCOUNTER
Called to review US results and need biopsy and CT chest/abdomen/pelvis for further work up  Left call back number to discuss

## 2022-10-06 ENCOUNTER — PREP FOR PROCEDURE (OUTPATIENT)
Dept: INTERVENTIONAL RADIOLOGY/VASCULAR | Facility: CLINIC | Age: 57
End: 2022-10-06

## 2022-10-06 DIAGNOSIS — R59.9 ENLARGED LYMPH NODE: Primary | ICD-10-CM

## 2022-10-06 NOTE — TELEPHONE ENCOUNTER
Spoke with the NP from [address] [place] [address] [address]  who was with Olena Sprague to explain the results of the [address] and my recommendations for biopsy and CT scan  All of her questions were answered  Referral to IR was placed  CT scan scheduling will need to be arranged with the facility  The number [phone] she is on unit #1

## 2022-10-06 NOTE — TELEPHONE ENCOUNTER
Attempted to call patients son, emergency contact, to discuss US results and need for biopsy and CT scan

## 2022-10-07 NOTE — TELEPHONE ENCOUNTER
The CT was already scheduled for the patient and once the IR physicians review the chart the department will reach out the the patient directly to schedule  If the CT needs to be moved to a sooner appointment or if IR has to coordinate with [facility] [address], please forward the task to check out

## 2022-10-25 ENCOUNTER — HOSPITAL ENCOUNTER (OUTPATIENT)
Dept: CT IMAGING | Facility: HOSPITAL | Age: 57
Discharge: HOME | End: 2022-10-25
Attending: INTERNAL MEDICINE
Payer: MEDICARE

## 2022-10-25 DIAGNOSIS — R59.9 ENLARGED LYMPH NODE: ICD-10-CM

## 2022-10-25 PROCEDURE — 71260 CT THORAX DX C+: CPT

## 2022-10-25 PROCEDURE — 74177 CT ABD & PELVIS W/CONTRAST: CPT

## 2022-10-25 RX ADMIN — IOHEXOL 100 ML: 350 INJECTION, SOLUTION INTRAVENOUS at 12:18

## 2022-10-27 ENCOUNTER — HOSPITAL ENCOUNTER (OUTPATIENT)
Dept: RADIOLOGY | Facility: HOSPITAL | Age: 57
Discharge: HOME | End: 2022-10-27
Attending: RADIOLOGY
Payer: MEDICARE

## 2022-10-27 ENCOUNTER — NURSING HOME VISIT (OUTPATIENT)
Dept: GERIATRICS | Facility: OTHER | Age: 57
End: 2022-10-27

## 2022-10-27 VITALS
SYSTOLIC BLOOD PRESSURE: 135 MMHG | TEMPERATURE: 97.3 F | HEART RATE: 78 BPM | WEIGHT: 209 LBS | BODY MASS INDEX: 30.86 KG/M2 | RESPIRATION RATE: 18 BRPM | DIASTOLIC BLOOD PRESSURE: 74 MMHG | OXYGEN SATURATION: 96 %

## 2022-10-27 VITALS — OXYGEN SATURATION: 100 % | HEART RATE: 64 BPM

## 2022-10-27 DIAGNOSIS — F41.9 INSOMNIA SECONDARY TO ANXIETY: ICD-10-CM

## 2022-10-27 DIAGNOSIS — J86.9 EMPYEMA LUNG (HCC): ICD-10-CM

## 2022-10-27 DIAGNOSIS — I10 ESSENTIAL HYPERTENSION: ICD-10-CM

## 2022-10-27 DIAGNOSIS — J96.11 CHRONIC RESPIRATORY FAILURE WITH HYPOXIA (HCC): ICD-10-CM

## 2022-10-27 DIAGNOSIS — R26.2 AMBULATORY DYSFUNCTION: ICD-10-CM

## 2022-10-27 DIAGNOSIS — I48.0 PAROXYSMAL ATRIAL FIBRILLATION (HCC): ICD-10-CM

## 2022-10-27 DIAGNOSIS — R59.9 ENLARGED LYMPH NODE: ICD-10-CM

## 2022-10-27 DIAGNOSIS — F41.9 ANXIETY: ICD-10-CM

## 2022-10-27 DIAGNOSIS — Z93.0 TRACHEOSTOMY IN PLACE (HCC): ICD-10-CM

## 2022-10-27 DIAGNOSIS — Z79.4 TYPE 2 DIABETES MELLITUS WITH HYPERGLYCEMIA, WITH LONG-TERM CURRENT USE OF INSULIN (HCC): ICD-10-CM

## 2022-10-27 DIAGNOSIS — E11.65 TYPE 2 DIABETES MELLITUS WITH HYPERGLYCEMIA, WITH LONG-TERM CURRENT USE OF INSULIN (HCC): ICD-10-CM

## 2022-10-27 DIAGNOSIS — J43.2 CENTRILOBULAR EMPHYSEMA (HCC): Primary | ICD-10-CM

## 2022-10-27 DIAGNOSIS — F51.05 INSOMNIA SECONDARY TO ANXIETY: ICD-10-CM

## 2022-10-27 DIAGNOSIS — I82.462 DEEP VEIN THROMBOSIS (DVT) OF CALF MUSCLE VEIN OF LEFT LOWER EXTREMITY, UNSPECIFIED CHRONICITY (HCC): ICD-10-CM

## 2022-10-27 PROCEDURE — 76942 ECHO GUIDE FOR BIOPSY: CPT

## 2022-10-27 PROCEDURE — 38505 NEEDLE BIOPSY LYMPH NODES: CPT

## 2022-10-27 NOTE — BRIEF OP NOTE (RAD/CATH)
INTERVENTIONAL RADIOLOGY PROCEDURE NOTE    Date: 10/27/2022    Procedure: IR BIOPSY INGUINAL LYMPH NODE    Preoperative diagnosis:   1  Enlarged lymph node         Postoperative diagnosis: Same  Surgeon: Melba Gonsalez     Assistant: None  No qualified resident was available  Blood loss: 2 mL    Specimens: 5 core needle samples (3 into formalin, 2 into RPMI)  Findings: Successful enlarged left inguinal LN biopsy  Complications: None immediate      Anesthesia: local

## 2022-10-27 NOTE — PROGRESS NOTES
[address]  [phone]  [address] [place] [address] St: [place]  Code 28    NAME: Kelli Red  AGE: 65 y o  SEX: female   CODE STATUS: CPR    DATE OF ADMISSION: 8/30/2022    DATE OF DISCHARGE: 10/29/2022   DISCHARGE DISPOSITION: Stable for discharge to home with family support and home health PT/OT/SN services  Reason for Admission: Patient was admitted to [facility] for rehabilitation after hospitalization for Acute on Chronic Respiratory failure  Past Medical and Surgical History:   Past Medical History:   Diagnosis Date   • Anxiety    • Aortic aneurysm ([facility] )    • Arthritis    • Depression    • Diabetes mellitus ([facility] )    • Epigastric pain 7/2/2019   • Fibromyalgia    • GERD (gastroesophageal reflux disease)    • GERD (gastroesophageal reflux disease)    • H/O cardiovascular stress test 09/2018    no ischemia  EF 70%  • H/O echocardiogram 01/2019    EF 60%  Mild LVH  trivial effusion  • Hemoptysis 7/17/2022   • Hyperlipidemia    • Hypertension    • Hypoestrogenism 7/26/2017   • Hypokalemia 8/11/2018   • Hypomagnesemia 6/28/2022   • Hyponatremia 7/19/2022   • Lung bullae ([facility] ) 1/18/2022   • Migraines    • Pressure injury of sacral region, stage 3 ([facility] ) 8/31/2022   • Psychiatric disorder     anxiety   • Uncontrolled hypertension 2/25/2015    Last Assessment & Plan:  BP today above goal <140/90, apparently asymptomatic  Prior BP elevations were attributed to not taking medication  Consider increased medication if persistent on f/u  Past Surgical History:   Procedure Laterality Date   • BACK SURGERY      Lumbar epidural steroid injection   • CARDIAC CATHETERIZATION N/A 10/22/2021    Procedure: Cardiac pci;  Surgeon: Michelle Bourgeois MD;  Location: [facility] CARDI[facility] C[facility]; Service: Cardiology   • CARDIAC CATHETERIZATION  10/22/2021    Procedure: Cardiac catheterization;  Surgeon: Michelle Bourgeois MD;  Location: [facility] CARD[facility];   Service: Cardiology   • CARDIAC CATHETERIZATION Left 10/27/2021    Procedure: Cardiac Left Heart Cath;  Surgeon: Nate Pederson MD;  Location: [facility] CARD[facility]; Service: Cardiology   • CARDIAC CATHETERIZATION N/A 10/27/2021    Procedure: Cardiac pci;  Surgeon: Nate Pederson MD;  Location: [facility] CARD[facility]; Service: Cardiology   • CARDIAC CATHETERIZATION N/A 10/28/2021    Procedure: Cardiac pci;  Surgeon: Rainey Goldmann, DO;  Location: [facility] CARD[facility] C[facility]; Service: Cardiology   • CARPAL TUNNEL RELEASE Left    •  SECTION     • CHOLECYSTECTOMY     • COLONOSCOPY      incomplete   • COLONOSCOPY     • EYE SURGERY     • HYSTERECTOMY      Total   • IR BIOPSY INGUINAL LYMPH NODE  10/27/2022   • IR CHEST TUBE PLACEMENT  2022   • IR CHEST TUBE PLACEMENT  2022   • IR CHEST TUBE PLACEMENT  2022   • LUNG DECORTICATION Right 2022    Procedure: DECORTICATION LUNG;  Surgeon: Coco Rosario MD;  Location: [facility] MAIN OR;  Service: Thoracic   • OVARIAN CYST REMOVAL     • PEG W/TRACHEOSTOMY PLACEMENT N/A 2021    Procedure: TRACHEOSTOMY WITH INSERTION PEG TUBE;  Surgeon: Karie Fung DO;  Location: [facility] MAIN OR;  Service: General   • THORACOSCOPY VIDEO ASSISTED SURGERY (VATS) Right 2022    Procedure: THORACOSCOPY VIDEO ASSISTED SURGERY (VATS), RIGHT;  Surgeon: Coco Rosario MD;  Location: [facility] MAIN OR;  Service: Thoracic   • TUBAL LIGATION     • UPPER GASTROINTESTINAL ENDOSCOPY         Course of stay:   Deborah Bassett is a 65year old female admitted to [address] [place] [address] on 22 for STR following hospital stay for respiratory failure  She is currently a LTC resident at [address] [place] [address], she is being seen today for Discharge visit  Past Medical Hx including but not limited to Chronic Hypoxic Resp Failure, Trach dependent, CHANTALE, AFIB, DVT, CAD s/p cardiac arrest s/p PCI on Brilinta, HTN, CVA, CHF (EF 50%), DM2 with neuropathy, GERD, CKD3, ambulatory dysfunction      Initially patient presented to [address] [place] [address]  [facility] 8/2/2022 as a direct transfer from [facility] for cardiothoracic surgery eval for possible  VATS procedure due to right lung empyema  Patient originally presented to [facility] on 07/19 with chest pain and shortness of breath she was found to have a spontaneous pneumothorax s/p chest tube placed in IR on 07/21 later upsized on 07/22  Patient developed severe pain on the right side of her chest tube insertion subsequently with removal on 07/24  Imaging revealed persistent pneumothorax and empyema and patient was transferred to [facility] for repeat chest tube insertion  Patient was evaluated by acute pain services provided peripheral block to help with her pain control  She was treated with IV vancomycin and cefepime as her wound and sputum cultures grew MRSA  ID was following for antibiotic regimen which she completed on 8/20/2022  Pulmonary also following who recommended  VATS procedure, this was done on 8/6/2022 due to her right lung empyema  Patient had apical chest tubes placed which were ventrally removed on 8/9/2022  Patient developed ABLA during procedure and required total of 5 units PRBCs throughout her hospital stay to maintain an optimum hemoglobin level  Of note patient also has history of CHF upon her initial admission  was treated with Bumex 2 mg b i d  and Aldactone 100 mg daily- unfortunately patient did have worsening renal function and these medications were held  Patient also had uncontrolled diabetes throughout her hospital stay, insulin was adjusted and eventually was discharged on Lantus 30 units q h s  and Humalog 5 units t i d  PT/OT recommended discharge to short-term rehab  [facility]  Her rehab was complicated due to acute onset respiratory distress with hypoxia  Patient eventually required ED transfer and was admitted to [facility] 8/24/2022 through 8/30/2022  Patient did test positive for COVID-19 on 8/24/2022, Patient had thick sputum, She remained afebrile  Patient was requiring her baseline O2 requirements a of 8-10 liters via trach collar saturating in the high 90s  Respiratory Symptoms were unlikely related to COVID due to her resolution of symptoms in a timely manner  Patient was however treated on a mild COVID pathway with remdesivir and dexamethasone  BNP on admission 8400  Chest x-ray on this admission revealed moderate pulmonary vascular congestion with a small right pleural effusion  Cardiology recommended transitioning her from Bumex to furosemide 40 mg b i d   Patient also found to have DVT of left soleus vein during this admission, elevated D-dimer 262  DVT is chronic versus subacute nonocclusive  This was in the setting of Eliquis use due to atrial fibrillation  Suspected Eliquis failure  Hematology recommending Pradaxa replacement for Eliquis in setting of Eliquis failure, switched to Pradaxa and previously on Brillinta for prior MI  She follows with Heme/Onc as OP  Patient notifed this provider that she "will be leaving [place] on Saturday and is moving to [place]"  Patient states her , who lives down there is picking her up Saturday morning and taking her down to [place]  Patient has requested her medications be sent to a local [facility]  Patient states she does not currently have provider's or PCP established down in [facility] however she has asked for her husbands help with this  She also states, "If I need to admit myself to the ED when I get down there, I will " I have asked resident if she would consider getting care established first and then moving however she was not in agreement with this plan and was insistent on leaving facility this Saturday  Patient offers no mental or physical complaints on exam today  She is on room air, her trach will be changed by Respiratory therapy tomorrow prior to her discharge   She denies CP/SOB/N/V/D  Denies lightheadedness, dizziness, headaches, vision changes  States she/he is eating well and staying hydrated  Denies any bowel or bladder issues  Patient is mobile in a wheelchair independently, she manages most of her ADLs/IADLs and requires only minimum assistance at times  Patient states she is sleeping well on current regimen, denies any pain on exam today  No concerns from nursing at this time  ROS:  Review of Systems   Constitutional: Negative for chills and fever  HENT: Negative for congestion, rhinorrhea and sore throat  Eyes: Negative for pain and visual disturbance  Respiratory: Positive for shortness of breath (with exertion)  Negative for cough and wheezing  Cardiovascular: Negative for chest pain and palpitations  Gastrointestinal: Negative for abdominal pain, constipation, diarrhea and nausea  Genitourinary: Negative for decreased urine volume, difficulty urinating, dysuria and hematuria  Musculoskeletal: Negative for arthralgias and back pain  Skin: Negative for color change and rash  Neurological: Negative for dizziness, syncope, weakness, numbness and headaches  Psychiatric/Behavioral: Negative for dysphoric mood and sleep disturbance  The patient is not nervous/anxious  PHYSICAL EXAM:  VITALS:   Vitals:    10/27/22 1827   BP: 135/74   Pulse: 78   Resp: 18   Temp: (!) 97 3 °F (36 3 °C)   SpO2: 96%        Physical Exam  Vitals and nursing note reviewed  Constitutional:       General: She is not in acute distress  Appearance: Normal appearance  Comments: Deryl Debar in place with trach collar   HENT:      Head: Normocephalic and atraumatic  Nose: No congestion or rhinorrhea  Mouth/Throat:      Mouth: Mucous membranes are pale and dry  Eyes:      General: No scleral icterus  Conjunctiva/sclera: Conjunctivae normal       Pupils: Pupils are equal, round, and reactive to light  Neck:      Trachea: Tracheostomy present   No tracheal deviation  Cardiovascular:      Rate and Rhythm: Normal rate and regular rhythm  Pulses: Normal pulses  Heart sounds: Normal heart sounds  No murmur heard  Pulmonary:      Effort: Pulmonary effort is normal  No respiratory distress  Breath sounds: Decreased breath sounds present  No wheezing, rhonchi or rales  Abdominal:      General: Bowel sounds are normal  There is no distension  Palpations: Abdomen is soft  There is no mass  Tenderness: There is no abdominal tenderness  Hernia: No hernia is present  Musculoskeletal:         General: No swelling or tenderness  Cervical back: Full passive range of motion without pain and normal range of motion  Lymphadenopathy:      Cervical: No cervical adenopathy  Skin:     General: Skin is warm and dry  Coloration: Skin is not pale  Findings: No rash  Neurological:      General: No focal deficit present  Mental Status: She is alert and oriented to person, place, and time  Mental status is at baseline  GCS: GCS eye subscore is 4  GCS verbal subscore is 5  GCS motor subscore is 6  Motor: Weakness present  Gait: Gait normal    Psychiatric:         Attention and Perception: Attention normal          Mood and Affect: Mood normal          Behavior: Behavior normal  Behavior is cooperative  Admission Diagnoses:   1  Centrilobular emphysema ([facility] )  Assessment & Plan:  · Previous smoking history- has quit  · Continue Nebulizered Broncodialtors   · Stable on Room air 96% with Trach due to hx of subglottic stenosis - does get SOB with exertion but this is not new- recovers quickly   · Needs to establish care in [place]- patient verbalized understanding     Orders:  -     albuterol (2 5 mg/3 mL) 0 083 % nebulizer solution; Take 3 mL (2 5 mg total) by nebulization every 4 (four) hours as needed for wheezing or shortness of breath  -     ipratropium (ATROVENT) 0 02 % nebulizer solution;  Take 2 5 mL (0 5 mg total) by nebulization 3 (three) times a day  -     levalbuterol (XOPENEX) 1 25 mg/3 mL nebulizer solution; Take 3 mL (1 25 mg total) by nebulization 3 (three) times a day SOB/Wheezing    2  Chronic respiratory failure with hypoxia (HCC)  Assessment & Plan:  · Hx of COPD and Trach  · Continue Tach care  · ENT follow up as scheduled  · Pulmonary follow up as scheduled   · Doing well on room air with Trach  · Continue Atrovent, Xopexex, Albuterol     Orders:  -     albuterol (2 5 mg/3 mL) 0 083 % nebulizer solution; Take 3 mL (2 5 mg total) by nebulization every 4 (four) hours as needed for wheezing or shortness of breath  -     ipratropium (ATROVENT) 0 02 % nebulizer solution; Take 2 5 mL (0 5 mg total) by nebulization 3 (three) times a day  -     furosemide (LASIX) 40 mg tablet; Take 1 tablet (40 mg total) by mouth 2 (two) times a day  -     levalbuterol (XOPENEX) 1 25 mg/3 mL nebulizer solution; Take 3 mL (1 25 mg total) by nebulization 3 (three) times a day SOB/Wheezing    3  Empyema lung, Right  Assessment & Plan:  · S/p VATS 8/6/22  · 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   · Doing well on Trach Collar with FiO2 35%  · Continue OP f/u with Pulmonology   · Recommend OP f/u with ENT for trach management       4  Tracheostomy in place [facility])  Assessment & Plan:  · Hx of subglottic stenosis   · Continue Trach care- done prior to discharge   · Seen 10/24/22 by ENT :Pt currently has a 7 5 trach tube in place- unknown brand  Recommended a #6 Shiley trach to be changed in 6 weeks  · Respiratory therapy aware at [facility] and will change trach  · OP f/u with Pulmonary and ENT - patient to establish care down in [place]       5  Insomnia secondary to anxiety  Assessment & Plan:  · Continue Lorazepam 0 5 mg Q 8 hrs PRN  · Continue Mirtazapine 7 5 mg Q HS    Orders:  -     LORazepam (Ativan) 1 mg tablet;  Take 0 5 tablets (0 5 mg total) by mouth every 8 (eight) hours as needed for anxiety or sleep  -     mirtazapine (REMERON) 7 5 MG tablet; Take 1 tablet (7 5 mg total) by mouth daily at bedtime    6  Anxiety  Assessment & Plan:  · Chronic long standing hx of Anxiety and Depression with Insomnia  · She denies SI/HI- mood currently stable on exam   · She is maintained on Lexapro and Mirtazapine  · She uses Lorazepam PRN for Anxiety     Orders:  -     LORazepam (Ativan) 1 mg tablet; Take 0 5 tablets (0 5 mg total) by mouth every 8 (eight) hours as needed for anxiety or sleep  -     escitalopram (LEXAPRO) 10 mg tablet; Take 1 tablet (10 mg total) by mouth daily    7  Ambulatory dysfunction  Assessment & Plan:  • Multifactorial  • Completed PT/OT during STR stay- she transitioned to LTC as she was homeless- patient now states, "I want to be discharged on Saturday, my  is coming to pick me up on Saturday and I'm moving to [place] with him"   • Patient states she will establish care once she is down there, she states she has a nebulizer that her son is getting out of storage  • She requested her medications be sent to [facility] in [place] for her to  prior to departing for [place]   • Medically Stable for discharge        8  Essential hypertension  Assessment & Plan:  · Stable on exam   · Continue Amlodipine 5 mg Daily, Lasix 40 mg BID, Metoprolol Succ 50 mg Daily     Orders:  -     amLODIPine (NORVASC) 5 mg tablet; Take 1 tablet (5 mg total) by mouth daily  -     atorvastatin (LIPITOR) 40 mg tablet; Take 1 tablet (40 mg total) by mouth daily with dinner  -     metoprolol succinate (TOPROL-XL) 50 mg 24 hr tablet; Take 1 tablet (50 mg total) by mouth every 12 (twelve) hours    9   Deep vein thrombosis (DVT) of calf muscle vein of left lower extremity, unspecified chronicity (HCC)  Assessment & Plan:  · Chronic vs Subacute while on Eliquis  · Changed to Pradaxa due to considered eliquis failure   · Hematology f/u as OP in 1-2 months     Orders:  -     dabigatran etexilate (PRADAXA) 150 mg capsu; Take 1 capsule (150 mg total) by mouth every 12 (twelve) hours  -     ticagrelor (BRILINTA) 90 MG; Take 1 tablet (90 mg total) by mouth every 12 (twelve) hours    10  Paroxysmal atrial fibrillation (HCC)  Assessment & Plan:  · Controlled on exam  · Continue Metoprolol and Amiodarone for rate control  · [facility] as well as Pradaxa     Orders:  -     dabigatran etexilate (PRADAXA) 150 mg capsu; Take 1 capsule (150 mg total) by mouth every 12 (twelve) hours  -     ticagrelor (BRILINTA) 90 MG; Take 1 tablet (90 mg total) by mouth every 12 (twelve) hours  -     amiodarone 100 mg tablet; Take 1 tablet (100 mg total) by mouth daily with breakfast    11  Type 2 diabetes mellitus with hyperglycemia, with long-term current use of insulin [facility])  Assessment & Plan:      Lab Results   Component Value Date    HGBA1C 8 1 (H) 08/24/2022     Needs better A1c control     Continue Glargine 30 units Q HS  HumaLog 10 units TID with meals     Recheck HgbA1c around 11/25/2022    Orders:  -     pregabalin (LYRICA) 75 mg capsule; Take 1 capsule (75 mg total) by mouth daily  -     atorvastatin (LIPITOR) 40 mg tablet; Take 1 tablet (40 mg total) by mouth daily with dinner  -     insulin lispro (HumaLOG KwikPen) 100 units/mL injection pen; Inject 10 Units under the skin 3 (three) times a day with meals  -     Insulin Glargine Solostar (Basaglar KwikPen) 100 UNIT/ML SOPN; Inject 0 35 mL (35 Units total) under the skin daily at bedtime  -     Insulin Pen Needle (B-D UF III MINI PEN NEEDLES) 31G X 5 MM MISC; Use 4 (four) times a day       Follow-up Recommendations:    • Outpatient Follow up with PCP in the next 2 weeks  • Home Health PT/OT/SN services     Labs and testing performed during stay:    Reviewed in chart     Discharge Medications: See discharge medication list which was reviewed and signed        Current Outpatient Medications:   •  albuterol (2 5 mg/3 mL) 0 083 % nebulizer solution, Take 3 mL (2 5 mg total) by nebulization every 4 (four) hours as needed for wheezing or shortness of breath, Disp: 180 mL, Rfl: 0  •  amiodarone 100 mg tablet, Take 1 tablet (100 mg total) by mouth daily with breakfast, Disp: 30 tablet, Rfl: 0  •  amLODIPine (NORVASC) 5 mg tablet, Take 1 tablet (5 mg total) by mouth daily, Disp: 30 tablet, Rfl: 0  •  atorvastatin (LIPITOR) 40 mg tablet, Take 1 tablet (40 mg total) by mouth daily with dinner, Disp: 30 tablet, Rfl: 0  •  dabigatran etexilate (PRADAXA) 150 mg capsu, Take 1 capsule (150 mg total) by mouth every 12 (twelve) hours, Disp: 60 capsule, Rfl: 0  •  escitalopram (LEXAPRO) 10 mg tablet, Take 1 tablet (10 mg total) by mouth daily, Disp: 30 tablet, Rfl: 0  •  furosemide (LASIX) 40 mg tablet, Take 1 tablet (40 mg total) by mouth 2 (two) times a day, Disp: 60 tablet, Rfl: 0  •  Insulin Glargine Solostar (Basaglar KwikPen) 100 UNIT/ML SOPN, Inject 0 35 mL (35 Units total) under the skin daily at bedtime, Disp: 12 mL, Rfl: 0  •  insulin lispro (HumaLOG KwikPen) 100 units/mL injection pen, Inject 10 Units under the skin 3 (three) times a day with meals, Disp: 9 mL, Rfl: 0  •  Insulin Pen Needle (B-D UF III MINI PEN NEEDLES) 31G X 5 MM MISC, Use 4 (four) times a day, Disp: 100 each, Rfl: 0  •  ipratropium (ATROVENT) 0 02 % nebulizer solution, Take 2 5 mL (0 5 mg total) by nebulization 3 (three) times a day, Disp: 225 mL, Rfl: 0  •  levalbuterol (XOPENEX) 1 25 mg/3 mL nebulizer solution, Take 3 mL (1 25 mg total) by nebulization 3 (three) times a day SOB/Wheezing, Disp: 270 mL, Rfl: 0  •  LORazepam (Ativan) 1 mg tablet, Take 0 5 tablets (0 5 mg total) by mouth every 8 (eight) hours as needed for anxiety or sleep, Disp: 30 tablet, Rfl: 0  •  metoprolol succinate (TOPROL-XL) 50 mg 24 hr tablet, Take 1 tablet (50 mg total) by mouth every 12 (twelve) hours, Disp: 60 tablet, Rfl: 0  •  mirtazapine (REMERON) 7 5 MG tablet, Take 1 tablet (7 5 mg total) by mouth daily at bedtime, Disp: 30 tablet, Rfl: 0  • pregabalin (LYRICA) 75 mg capsule, Take 1 capsule (75 mg total) by mouth daily, Disp: 30 capsule, Rfl: 0  •  ticagrelor (BRILINTA) 90 MG, Take 1 tablet (90 mg total) by mouth every 12 (twelve) hours, Disp: 60 tablet, Rfl: 0  •  acetaminophen (TYLENOL) 325 mg tablet, Take 3 tablets (975 mg total) by mouth every 8 (eight) hours, Disp: 30 tablet, Rfl: 0  •  camphor-menthol (SARNA) lotion, Apply 1 application topically 2 (two) times a day For dry/itchy skin, Disp: , Rfl:   •  ferrous sulfate 325 (65 Fe) mg tablet, Take 1 tablet (325 mg total) by mouth daily with breakfast, Disp: 30 tablet, Rfl: 0  •  lidocaine (LIDODERM) 5 %, Apply 2 patches topically daily Remove & Discard patch within 12 hours or as directed by MD, Disp: 30 patch, Rfl: 0  •  multivitamin-minerals therapeutic (THERA M PLUS), Take 1 tablet by mouth in the morning, Disp: , Rfl:   •  omeprazole (PriLOSEC) 20 mg delayed release capsule, Take 20 mg by mouth daily, Disp: , Rfl:   •  polyethylene glycol (MIRALAX) 17 g packet, Take 17 g by mouth daily, Disp: , Rfl: 0     Discussion with patient/family and further instructions:  -Fall precautions  -Aspiration precautions  -Bleeding precautions  -Monitor for signs/symptoms of infection  -Medication list was reviewed and updated    30 day supply of Medications sent to [facility] per patient request     Status at time of discharge: Stable    Plan discussed with Dr Jose E Noel noted agreement with assessment and plan  Billing based on time  Time spent on unit, 40 minutes  Time spent counseling pt on debility/condition, 30 minutes  Please note:  Voice-recognition software may have been used in the preparation of this document  Occasional wrong word or "sound-alike" substitutions may have occurred due to the inherent limitations of voice recognition software  Interpretation should be guided by [facility]  10/27/2022

## 2022-10-27 NOTE — DISCHARGE INSTRUCTIONS
POST BIOPSY    Care after your procedure:    1  Limit your activities for 24 hours after your biopsy  2  No driving day of biopsy  3  Return to your normal diet  Small sips of flat soda will help with mild nausea  4  Remove band-aid or dressing 24 hours after procedure  Contact Interventional Radiology at [phone] Rashid PATIENTS: Contact Interventional Radiology at [phone]) Berto Valenzuela PATIENTS: Contact Interventional Radiology at [phone]) if:    1  Difficulty breathing, nausea or vomiting  2  Chills or fever above 101 degrees F      3  Pain at biopsy site not relieved by medication  4  Develop any redness, swelling, heat, unusual drainage, heavy bruising or bleeding from biopsy site

## 2022-10-27 NOTE — H&P
Interventional Radiology  History and Physical 10/27/2022     Maggie Graham   1965   [id number]    Assessment/Plan:  56-year-old female with enlarged left inguinal lymph node presents for biopsy  Problem List Items Addressed This Visit    None     Visit Diagnoses     Enlarged lymph node        Relevant Orders    IR biopsy inguinal lymph node             Subjective:     Patient ID: Maggie Graham is a 65 y o  female  History of Present Illness  Patient with enlarged left inguinal lymph node presents for biopsy  Review of Systems      Past Medical History:   Diagnosis Date   • Anxiety    • Aortic aneurysm [facility])    • Arthritis    • Depression    • Diabetes mellitus ([address] )    • Epigastric pain 7/2/2019   • Fibromyalgia    • GERD (gastroesophageal reflux disease)    • GERD (gastroesophageal reflux disease)    • H/O cardiovascular stress test 09/2018    no ischemia  EF 70%  • H/O echocardiogram 01/2019    EF 60%  Mild LVH  trivial effusion  • Hemoptysis 7/17/2022   • Hyperlipidemia    • Hypertension    • Hypoestrogenism 7/26/2017   • Hypokalemia 8/11/2018   • Hypomagnesemia 6/28/2022   • Hyponatremia 7/19/2022   • Lung bullae ([facility] [address] ) 1/18/2022   • Migraines    • Pressure injury of sacral region, stage 3 ([facility] [address] ) 8/31/2022   • Psychiatric disorder     anxiety   • Uncontrolled hypertension 2/25/2015    Last Assessment & Plan:  BP today above goal <140/90, apparently asymptomatic  Prior BP elevations were attributed to not taking medication  Consider increased medication if persistent on f/u  Past Surgical History:   Procedure Laterality Date   • BACK SURGERY      Lumbar epidural steroid injection   • CARDIAC CATHETERIZATION N/A 10/22/2021    Procedure: Cardiac pci;  Surgeon: Kwabena Ferguson MD;  [facility]; Service: Cardiology   • CARDIAC CATHETERIZATION  10/22/2021    Procedure: Cardiac catheterization;  Surgeon: Kwabena Ferguson MD;  [facility];   Service: Cardiology   • CARDIAC CATHETERIZATION Left 10/27/2021    Procedure: Cardiac Left Heart Cath;  Surgeon: Danelle Mitchell MD;  [facility]; Service: Cardiology   • CARDIAC CATHETERIZATION N/A 10/27/2021    Procedure: Cardiac pci;  Surgeon: Danelle Mitchell MD;  [facility]; Service: Cardiology   • CARDIAC CATHETERIZATION N/A 10/28/2021    Procedure: Cardiac pci;  Surgeon: Alyx Blanco DO;  [facility]; Service: Cardiology   • CARPAL TUNNEL RELEASE Left    •  SECTION     • CHOLECYSTECTOMY     • COLONOSCOPY      incomplete   • COLONOSCOPY     • EYE SURGERY     • HYSTERECTOMY      Total   • IR CHEST TUBE PLACEMENT  2022   • IR CHEST TUBE PLACEMENT  2022   • IR CHEST TUBE PLACEMENT  2022   • LUNG DECORTICATION Right 2022    Procedure: DECORTICATION LUNG;  Surgeon: Savannah Rouse MD;  [facility];  Service: Thoracic   • OVARIAN CYST REMOVAL     • PEG W/TRACHEOSTOMY PLACEMENT N/A 2021    Procedure: TRACHEOSTOMY WITH INSERTION PEG TUBE;  Surgeon: Malia Fung DO;  Location[facility];  Service: General   • THORACOSCOPY VIDEO ASSISTED SURGERY (VATS) Right 2022    Procedure: THORACOSCOPY VIDEO ASSISTED SURGERY (VATS), RIGHT;  Surgeon: Savannah Rouse MD;  [facility];  Service: Thoracic   • TUBAL LIGATION     • UPPER GASTROINTESTINAL ENDOSCOPY          Social History     Tobacco Use   Smoking Status Former Smoker   • Packs/day: 1 00   • Years: 30 00   • Pack years: 30 00   • Types: Cigarettes   Smokeless Tobacco Never Used        Social History     Substance and Sexual Activity   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           Social History     Substance and Sexual Activity   Drug Use Yes   • Types: Marijuana    Comment: medical; seldom use        Allergies   Allergen Reactions   • Metformin Diarrhea   • Clonidine Rash     Patch        Current Outpatient Medications   Medication Sig Dispense Refill   • acetaminophen (TYLENOL) 325 mg tablet Take 3 tablets (975 mg total) by mouth every 8 (eight) hours 30 tablet 0   • albuterol (2 5 mg/3 mL) 0 083 % nebulizer solution Take 3 mL (2 5 mg total) by nebulization every 4 (four) hours as needed for wheezing or shortness of breath 180 mL 5   • amiodarone 100 mg tablet TAKE 1 TABLET BY MOUTH EVERY DAY WITH BREAKFAST 90 tablet 0   • amLODIPine (NORVASC) 5 mg tablet Take 1 tablet (5 mg total) by mouth daily  0   • atorvastatin (LIPITOR) 40 mg tablet Take 1 tablet (40 mg total) by mouth daily with dinner 30 tablet 0   • Basaglar KwikPen 100 units/mL SOPN      • benzonatate (TESSALON PERLES) 100 mg capsule Take 1 capsule (100 mg total) by mouth 3 (three) times a day as needed for cough 20 capsule 0   • bumetanide (BUMEX) 2 mg tablet      • camphor-menthol (SARNA) lotion Apply 1 application topically 2 (two) times a day For dry/itchy skin     • dabigatran etexilate (PRADAXA) 150 mg capsu Take 1 capsule (150 mg total) by mouth every 12 (twelve) hours  0   • escitalopram (LEXAPRO) 10 mg tablet TAKE 1 TABLET BY MOUTH EVERY DAY 90 tablet 1   • ferrous sulfate 325 (65 Fe) mg tablet Take 1 tablet (325 mg total) by mouth daily with breakfast 30 tablet 0   • furosemide (LASIX) 40 mg tablet Take 1 tablet (40 mg total) by mouth 2 (two) times a day  0   • hydrOXYzine HCL (ATARAX) 25 mg tablet Take 1 tablet (25 mg total) by mouth every 6 (six) hours as needed for anxiety 30 tablet 0   • insulin glargine (LANTUS) 100 units/mL subcutaneous injection Inject 30 Units under the skin daily at bedtime 10 mL 0   • insulin lispro (HumaLOG KwikPen) 100 units/mL injection pen Inject 10 Units under the skin 3 (three) times a day with meals 15 mL 0   • ipratropium (ATROVENT) 0 02 % nebulizer solution Take 2 5 mL (0 5 mg total) by nebulization 3 (three) times a day  0   • levalbuterol (XOPENEX) 1 25 mg/3 mL nebulizer solution Take 1 25 mg by nebulization 3 (three) times a day SOB/Wheezing     • lidocaine (LIDODERM) 5 % Apply 2 patches topically daily Remove & Discard patch within 12 hours or as directed by MD 30 patch 0   • LORazepam (ATIVAN) 0 5 mg tablet      • LORazepam (Ativan) 1 mg tablet Take 0 5 tablets (0 5 mg total) by mouth every 8 (eight) hours as needed for anxiety or sleep 30 tablet 0   • metoprolol succinate (TOPROL-XL) 50 mg 24 hr tablet Take 1 tablet (50 mg total) by mouth every 12 (twelve) hours 180 tablet 3   • mirtazapine (REMERON) 7 5 MG tablet Take 1 tablet (7 5 mg total) by mouth daily at bedtime 30 tablet 0   • multivitamin-minerals therapeutic (THERA M PLUS) Take 1 tablet by mouth in the morning     • omeprazole (PriLOSEC) 20 mg delayed release capsule Take 20 mg by mouth daily     • polyethylene glycol (MIRALAX) 17 g packet Take 17 g by mouth daily  0   • pregabalin (LYRICA) 75 mg capsule TAKE ONE CAPSULE EVERY DAY 90 capsule 1   • ticagrelor (BRILINTA) 90 MG Take 1 tablet (90 mg total) by mouth every 12 (twelve) hours 30 tablet 0     No current facility-administered medications for this encounter  Objective:    Vitals:    10/27/22 0851   Pulse: 64   SpO2: 100%        Physical Exam  Constitutional:       Appearance: Normal appearance  Pulmonary:      Effort: Pulmonary effort is normal    Skin:     General: Skin is dry  Lab Results   Component Value Date     (H) 07/24/2022      Lab Results   Component Value Date    WBC 12 46 (H) 08/30/2022    HGB 9 1 (L) 08/30/2022    HCT 28 8 (L) 08/30/2022    MCV 83 08/30/2022     (H) 08/30/2022     Lab Results   Component Value Date    INR 1 34 (H) 08/26/2022    INR 1 28 (H) 08/04/2022    INR 1 32 (H) 02/23/2022    PROTIME 16 8 (H) 08/26/2022    PROTIME 16 2 (H) 08/04/2022    PROTIME 16 2 (H) 02/23/2022     Lab Results   Component Value Date    PTT 72 (H) 08/29/2022         I have personally reviewed pertinent imaging and laboratory results       Code Status: Prior  Advance Directive and Living Will:      Power of :    POLST:      This text is generated with voice recognition software  There may be translation, syntax,  or grammatical errors  If you have any questions, please contact the dictating provider

## 2022-10-28 DIAGNOSIS — R93.89 ABNORMAL FINDING ON IMAGING: ICD-10-CM

## 2022-10-28 DIAGNOSIS — R59.9 ENLARGED LYMPH NODE: Primary | ICD-10-CM

## 2022-10-28 RX ORDER — DABIGATRAN ETEXILATE 150 MG/1
150 CAPSULE ORAL EVERY 12 HOURS SCHEDULED
Qty: 60 CAPSULE | Refills: 0 | Status: SHIPPED | OUTPATIENT
Start: 2022-10-28

## 2022-10-28 RX ORDER — LORAZEPAM 1 MG/1
0.5 TABLET ORAL EVERY 8 HOURS PRN
Qty: 30 TABLET | Refills: 0 | Status: SHIPPED | OUTPATIENT
Start: 2022-10-28

## 2022-10-28 RX ORDER — INSULIN GLARGINE 100 [IU]/ML
35 INJECTION, SOLUTION SUBCUTANEOUS
Qty: 12 ML | Refills: 0 | Status: SHIPPED | OUTPATIENT
Start: 2022-10-28

## 2022-10-28 RX ORDER — AMIODARONE HYDROCHLORIDE 100 MG/1
100 TABLET ORAL
Qty: 30 TABLET | Refills: 0 | Status: SHIPPED | OUTPATIENT
Start: 2022-10-28

## 2022-10-28 RX ORDER — AMLODIPINE BESYLATE 5 MG/1
5 TABLET ORAL DAILY
Qty: 30 TABLET | Refills: 0 | Status: SHIPPED | OUTPATIENT
Start: 2022-10-28

## 2022-10-28 RX ORDER — METOPROLOL SUCCINATE 50 MG/1
50 TABLET, EXTENDED RELEASE ORAL EVERY 12 HOURS
Qty: 60 TABLET | Refills: 0 | Status: SHIPPED | OUTPATIENT
Start: 2022-10-28

## 2022-10-28 RX ORDER — ESCITALOPRAM OXALATE 10 MG/1
10 TABLET ORAL DAILY
Qty: 30 TABLET | Refills: 0 | Status: SHIPPED | OUTPATIENT
Start: 2022-10-28

## 2022-10-28 RX ORDER — INSULIN LISPRO 100 [IU]/ML
10 INJECTION, SOLUTION INTRAVENOUS; SUBCUTANEOUS
Qty: 9 ML | Refills: 0 | Status: SHIPPED | OUTPATIENT
Start: 2022-10-28

## 2022-10-28 RX ORDER — PEN NEEDLE, DIABETIC 31 GX5/16"
NEEDLE, DISPOSABLE MISCELLANEOUS 4 TIMES DAILY
Qty: 100 EACH | Refills: 0 | Status: SHIPPED | OUTPATIENT
Start: 2022-10-28

## 2022-10-28 RX ORDER — LEVALBUTEROL INHALATION SOLUTION 1.25 MG/3ML
1.25 SOLUTION RESPIRATORY (INHALATION) 3 TIMES DAILY
Qty: 270 ML | Refills: 0 | Status: SHIPPED | OUTPATIENT
Start: 2022-10-28

## 2022-10-28 RX ORDER — ATORVASTATIN CALCIUM 40 MG/1
40 TABLET, FILM COATED ORAL
Qty: 30 TABLET | Refills: 0 | Status: SHIPPED | OUTPATIENT
Start: 2022-10-28

## 2022-10-28 RX ORDER — PREGABALIN 75 MG/1
75 CAPSULE ORAL DAILY
Qty: 30 CAPSULE | Refills: 0 | Status: SHIPPED | OUTPATIENT
Start: 2022-10-28

## 2022-10-28 RX ORDER — MIRTAZAPINE 7.5 MG/1
7.5 TABLET, FILM COATED ORAL
Qty: 30 TABLET | Refills: 0 | Status: SHIPPED | OUTPATIENT
Start: 2022-10-28

## 2022-10-28 RX ORDER — FUROSEMIDE 40 MG/1
40 TABLET ORAL 2 TIMES DAILY
Qty: 60 TABLET | Refills: 0 | Status: SHIPPED | OUTPATIENT
Start: 2022-10-28

## 2022-10-28 RX ORDER — ALBUTEROL SULFATE 2.5 MG/3ML
2.5 SOLUTION RESPIRATORY (INHALATION) EVERY 4 HOURS PRN
Qty: 180 ML | Refills: 0 | Status: SHIPPED | OUTPATIENT
Start: 2022-10-28 | End: 2022-11-27

## 2022-10-28 NOTE — ASSESSMENT & PLAN NOTE
· Controlled on exam  · Continue Metoprolol and Amiodarone for rate control  · Continue Brillinta for [facility] as well as Pradaxa

## 2022-10-28 NOTE — ASSESSMENT & PLAN NOTE
· Hx of COPD and Trach  · Continue Tach care  · ENT follow up as scheduled  · Pulmonary follow up as scheduled   · Doing well on room air with Trach  · Continue Atrovent, Xopexex, Albuterol

## 2022-10-28 NOTE — ASSESSMENT & PLAN NOTE
· Chronic long standing hx of Anxiety and Depression with Insomnia  · She denies SI/HI- mood currently stable on exam   · She is maintained on Lexapro and Mirtazapine  · She uses Lorazepam PRN for Anxiety

## 2022-10-28 NOTE — ASSESSMENT & PLAN NOTE
· Hx of subglottic stenosis   · Continue Trach care- done prior to discharge   · Seen 10/24/22 by ENT :Pt currently has a 7 5 trach tube in place- unknown brand  Recommended a #6 Shiley trach to be changed in 6 weeks     · Respiratory therapy aware at SNF and will change trach  · OP f/u with Pulmonary and ENT - patient to establish care down in [place]

## 2022-10-28 NOTE — ASSESSMENT & PLAN NOTE
· Seen by Heme/Onc in office on 9/26/22: reviewed notes  · An enlarged Lymph node of 2 7 cm noted on previous venous duplex  · s/p IR biopsy inguinal lymph node on Left   · No immediate complications- Left groin assessed today s/p biopsy- C/D/I good pulses

## 2022-10-28 NOTE — ASSESSMENT & PLAN NOTE
• Multifactorial  • Completed PT/OT during STR stay- she transitioned to LTC as she was homeless- patient now states, "I want to be discharged on Saturday, my  is coming to pick me up on Saturday and I'm moving to [place] with him"   • Patient states she will establish care once she is down there, she states she has a nebulizer that her son is getting out of storage  • She requested her medications be sent to [facility] in [place] for her to  prior to departing for [place]   • Medically Stable for discharge

## 2022-10-28 NOTE — PATIENT INSTRUCTIONS
Leia Dejesus,   It was a pleasure caring for you during your stay at [facility]  Please establish care with a PRIMARY CARE PROVIDER, a Lung Specialist ( Pulmonologist), an ENT, A Cardiologist and an Hematologist/Oncologist in [place]  It may be easiest to find a PCP (Primary Care Provider) first who can help you set up appointments with all other specialists  I have sent all prescription medications, except over the counter medications to the [facility] on [facility] in [place]  Take Care,   NEELAM Mensah              Chronic Lung Disease and Infection Prevention   WHAT YOU NEED TO KNOW:   A chronic lung condition can make infections such as a cold or the flu serious  These infections can cause more damage to your lungs  One episode of pneumonia puts you at risk to have more respiratory infections in the future  DISCHARGE INSTRUCTIONS:   What you can do to prevent respiratory infections:   Wash your hands often  This is the most important thing you can do to prevent infections  Wash your hands several times each day  Wash after you use the bathroom, change a child's diaper, and before you prepare or eat food  Use soap and water every time you wash your hands  Rub your soapy hands together, lacing your fingers  Use the fingers of one hand to scrub under the nails of the other hand  Wash for at least 20 seconds  Rinse with warm, running water for several seconds  Then dry your hands with a clean towel or paper towel  Use hand  that contains alcohol if soap and water are not available  Do not touch your eyes, nose, or mouth without washing your hands first          Cover a sneeze or cough  Use a tissue that covers your mouth and nose  Throw the tissue away in a trash can right away  Use the bend of your arm if a tissue is not available  Then wash your hands well with soap and water or use a hand   Do not stand close to anyone who is sneezing or coughing      Avoid crowds during flu season  Flu season is from late October to the middle of March  Do not have close contact with someone who is sick  Ask friends and family to visit only when they are not sick  Ask about vaccines you may need  Vaccines help protect you and others around you from some infections  Get the influenza (flu) vaccine as soon as recommended each year  The flu vaccine is available starting in September or October  Flu viruses change, so it is important to get a flu vaccine every year  Get the pneumonia vaccine if recommended  This vaccine is usually recommended every 5 years  Your provider will tell you when to get this vaccine, if needed  Talk to your healthcare provider about your vaccine history  Tell him or her if you did not get certain vaccines as a child, or you did not get all recommended doses  Tell him or her if you do not know your vaccine history  He or she will tell you which vaccines you need, and when to get them  What else you can do to stay healthy:   Do not smoke  Nicotine and other chemicals in cigarettes and cigars can cause lung damage  Ask your healthcare provider for information if you currently smoke and need help to quit  E-cigarettes or smokeless tobacco still contain nicotine  Talk to your healthcare provider before you use these products  Build resistance to infection  Eat a variety of healthy foods such as fruits, vegetables, and whole-grain foods  Choose dairy foods, meat, and other protein foods that are low in fat  Get plenty of sleep and physical activity, such as exercise  Your healthcare provider can help you create an exercise plan that is right for you  Protect your mouth from germs that lead to infection  Brush your teeth at least 2 times per day  See your dentist at least every 6 months  Follow up with your doctor or lung specialist as directed:  Write down your questions so you remember to ask them during your visits    © Copyright [employer] 2022 Information is for [employer] use only and may not be sold, redistributed or otherwise used for commercial purposes  All illustrations and images included in CareNotes® are the copyrighted property of A D A M , Inc  or Paolo Caceres  The above information is an  only  It is not intended as medical advice for individual conditions or treatments  Talk to your doctor, nurse or pharmacist before following any medical regimen to see if it is safe and effective for you  Type 2 Diabetes Management for Adults   WHAT YOU NEED TO KNOW:   Type 2 diabetes is a disease that affects how your body uses glucose (sugar)  Either your body cannot make enough insulin, or it cannot use the insulin correctly  It is important to keep diabetes controlled to prevent damage to your heart, blood vessels, and other organs  DISCHARGE INSTRUCTIONS:   Have someone call your local emergency number (911 in the [address] [facility]) if:   You cannot be woken  You have signs of diabetic ketoacidosis:     confusion, fatigue    vomiting    rapid heartbeat    fruity smelling breath    extreme thirst    dry mouth and skin    You have any of the following signs of a heart attack:      Squeezing, pressure, or pain in your chest    You may  also have any of the following:     Discomfort or pain in your back, neck, jaw, stomach, or arm    Shortness of breath    Nausea or vomiting    Lightheadedness or a sudden cold sweat    You have any of the following signs of a stroke:      Numbness or drooping on one side of your face     Weakness in an arm or leg    Confusion or difficulty speaking    Dizziness, a severe headache, or vision loss    Call your doctor or diabetes care team if:   You have a sore or wound that will not heal     You have a change in the amount you urinate  Your blood sugar levels are higher than your target goals  You often have lower blood sugar levels than your target goals      Your skin is red, dry, warm, or swollen  You have trouble coping with diabetes, or you feel anxious or depressed  You have questions or concerns about your condition or care  What you need to know about high blood sugar levels:  High blood sugar levels may not cause any symptoms  You may feel more thirsty or urinate more often than usual  Over time, high blood sugar levels can damage your nerves, blood vessels, tissues, and organs  The following can increase your blood sugar levels:  Large meals or large amounts of carbohydrates at one time    Less physical activity    Stress    Illness    A lower dose of medicine or insulin, or a late dose    What you need to know about low blood sugar levels: You can prevent symptoms such as shakiness, dizziness, irritability, or confusion by preventing your blood sugar levels from going too low  Treat a low blood sugar level right away  Drink 4 ounces of juice or have 1 tube of glucose gel  Check your blood sugar level again 10 to 15 minutes later  When the level goes back to normal, eat a meal or snack to prevent another decrease  Keep glucose gel, raisins, or hard candy with you at all times to treat a low blood sugar level  Your blood sugar level can get too low if you take diabetes medicine or insulin and do not eat enough food  If you use insulin, check your blood sugar level before you exercise  If your blood sugar level is below 100 mg/dL, eat 4 crackers or 2 ounces of raisins, or drink 4 ounces of juice  Check your level every 30 minutes if you exercise more than 1 hour  You may need a snack during or after exercise  What you can do to manage your blood sugar levels:   Check your blood sugar levels as directed and as needed  Several items are available to use to check your levels  You may need to check by testing a drop of blood in a glucose monitor  You may instead be given a continuous glucose monitoring (CGM) device   The device is worn at all times  The CGM checks your blood sugar level every 5 minutes  It sends results to an electronic device such as a smart phone  A CGM can be used with or without an insulin pump  Talk with your provider to find out which method is best for you  The goal for blood sugar levels before meals  is between 80 and 130 mg/dL and 2 hours after eating  is lower than 180 mg/dL  Make healthy food choices  Work with a dietitian to develop a meal plan that works for you and your schedule  A dietitian can help you learn how to eat the right amount of carbohydrates during your meals and snacks  Carbohydrates can raise your blood sugar level if you eat too many at one time  Examples of foods that contain carbohydrates are breads, cereals, rice, pasta, and sweets  Get regular physical activity  Physical activity can help you get to your target blood sugar level goal and manage your weight  Get at least 150 minutes of moderate to vigorous aerobic physical activity each week  Do not miss more than 2 days in a row  Do not sit longer than 30 minutes at a time  Your healthcare provider can help you create an activity plan  The plan can include the best activities for you and can help you build your strength and endurance  Maintain a healthy weight  Ask your healthcare provider what a healthy weight is for you  Ask him or her to help you create a safe weight loss plan if you are overweight  Take your diabetes medicine or insulin as directed  You may need diabetes medicine, insulin, or both to help control your blood sugar levels  Your healthcare provider will teach you how and when to take your diabetes medicine or insulin  You will also be taught about side effects oral diabetes medicine can cause  Insulin may be injected, or given through a pump or pen  You and your care team will discuss which method is best for you  An insulin pump  is an implanted device that gives your insulin 24 hours a day  An insulin pump prevents the need for multiple insulin injections in a day  An insulin pen  is a device prefilled with the right amount of insulin  You and your family members will be taught how to draw up and give insulin  if this is the best method for you  Your education team will also teach you how to dispose of needles and syringes  You will learn how much insulin you need  and when to give it  You will be taught when not to give insulin  You will also be taught what to do if your blood sugar level drops too low  This may happen if you take insulin and do not eat the right amount of carbohydrates  Other things you can do to manage type 2 diabetes:   Wear medical alert identification  Wear medical alert jewelry or carry a card that says you have diabetes  Ask your provider where to get these items  Do not smoke  Nicotine and other chemicals in cigarettes and cigars can cause lung and blood vessel damage  It also makes it more difficult to manage your diabetes  Ask your provider for information if you currently smoke and need help to quit  Do not use e-cigarettes or smokeless tobacco in place of cigarettes or to help you quit  They still contain nicotine  Check your feet each day for cuts, scratches, calluses, or other wounds  Look for redness and swelling, and feel for warmth  Wear shoes that fit well  Check your shoes for rocks or other objects that can hurt your feet  Do not walk barefoot or wear shoes without socks  Wear cotton socks to help keep your feet dry  Ask about vaccines you may need  You have a higher risk for serious illness if you get the flu, pneumonia, COVID-19, or hepatitis  Ask your provider if you should get vaccines to prevent these or other diseases, and when to get the vaccines  Talk to your care team if you become stressed about diabetes care  Sometimes being able to fit diabetes care into your life can cause increased stress   The stress can cause you not to take care of yourself properly  Your care team can help by offering tips about self-care  Your care team may suggest you talk to a mental health provider  The provider can listen and offer help with self-care issues  Follow up with your doctor or diabetes care team as directed: You may need to have blood tests done before your follow-up visit  The test results will show if changes need to be made in your treatment or self-care  Write down your questions so you remember to ask them during your visits  Talk to your provider if you cannot afford your medicine  © Copyright [employer] 2022 Information is for End User's use only and may not be sold, redistributed or otherwise used for commercial purposes  All illustrations and images included in CareNotes® are the copyrighted property of A D A M , Inc  or Paolo Caceres  The above information is an  only  It is not intended as medical advice for individual conditions or treatments  Talk to your doctor, nurse or pharmacist before following any medical regimen to see if it is safe and effective for you  Ticagrelor (By mouth)   Ticagrelor ([id number])  Helps prevent stroke, heart attack, and other heart problems  Also helps prevent first heart attack or stroke in patients with coronary artery disease, acute ischemic stroke, or high-risk transient ischemic attack  This medicine is an anti-platelet drug  Brand Name(s): Casey   There may be other brand names for this medicine  When This Medicine Should Not Be Used: This medicine is not right for everyone  Do not use it if you had an allergic reaction to ticagrelor, or if you have bleeding problems (including bleeding stomach ulcer) or a history of bleeding in your brain  How to Use This Medicine:   Tablet  Take your medicine as directed  Your dose may need to be changed several times to find what works best for you   Take this medicine at the same time every day   Your doctor may tell you to take aspirin with this medicine  Do not change the dose or stop taking the aspirin without talking to your doctor first   If you cannot swallow the tablet whole: You may crush the tablet and mix it in a glass of water  Drink it right away  Rinse the glass with more water and drink that too, so you get all the medicine  You may give the tablet and water mixture through a nasogastric tube  Flush the tube with more water so you receive all the medicine  This medicine should come with a Medication Guide  Ask your pharmacist for a copy if you do not have one  Missed dose: Skip the missed dose and take your next dose as usual  Do not take extra medicine to make up for a missed dose  Store the medicine in a closed container at room temperature, away from heat, moisture, and direct light  Drugs and Foods to Avoid:   Ask your doctor or pharmacist before using any other medicine, including over-the-counter medicines, vitamins, and herbal products  Some medicines can affect how ticagrelor works  Tell your doctor if you are using any of the following:  Atazanavir, carbamazepine, clarithromycin, digoxin, indinavir, itraconazole, ketoconazole, lovastatin, nefazodone, nelfinavir, phenobarbital, phenytoin, rifampin, ritonavir, saquinavir, simvastatin, telithromycin, or voriconazole  Blood thinner (including heparin)  Narcotic pain medicine (including morphine)  Warnings While Using This Medicine:   Tell your doctor if you are pregnant, or if you have liver disease, heart rhythm problems, lung or breathing problems (including asthma or COPD), or a history of bleeding problems  Tell your doctor if you had a recent surgery  Do not breastfeed during treatment with this medicine    This medicine may cause the following problems:  Lung or breathing problems, including central sleep apnea, Cheyne-Rowell respiration  Heart rhythm problems, including a slow heartbeat  This medicine may cause you to bleed and bruise more easily, and it may take longer than usual for bleeding to stop  Be careful to avoid injuries  Do not stop using this medicine unless your doctor tells you to  To stop it may increase your risk of a heart attack, blood clot, or other serious problem  Tell any doctor or dentist who treats you that you are using this medicine  This medicine may affect certain medical test results  Your doctor will do lab tests at regular visits to check on the effects of this medicine  Keep all appointments  Keep all medicine out of the reach of children  Never share your medicine with anyone  Possible Side Effects While Using This Medicine:   Call your doctor right away if you notice any of these side effects: Allergic reaction: Itching or hives, swelling in your face or hands, swelling or tingling in your mouth or throat, chest tightness, trouble breathing  Bloody or black, tarry stools, red or dark brown urine  Fast, slow, or pounding heartbeat  Lightheadedness, dizziness, or fainting  Trouble breathing, chest pain  Unusual bleeding, bruising, or weakness  Vomiting of blood or material that looks like coffee grounds  If you notice other side effects that you think are caused by this medicine, tell your doctor  Call your doctor for medical advice about side effects  You may report side effects to FDA at 7-229-FDA-0529    © Copyright [employer] 2022 Information is for End User's use only and may not be sold, redistributed or otherwise used for commercial purposes  The above information is an  only  It is not intended as medical advice for individual conditions or treatments  Talk to your doctor, nurse or pharmacist before following any medical regimen to see if it is safe and effective for you

## 2022-10-28 NOTE — ASSESSMENT & PLAN NOTE
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

## 2022-10-28 NOTE — ASSESSMENT & PLAN NOTE
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

## 2022-10-28 NOTE — ASSESSMENT & PLAN NOTE
Lab Results   Component Value Date    HGBA1C 8 1 (H) 08/24/2022     Needs better A1c control     Continue Glargine 30 units Q HS  HumaLog 10 units TID with meals     Recheck HgbA1c around 11/25/2022

## 2022-10-30 LAB — SCAN RESULT: NORMAL

## 2022-11-04 ENCOUNTER — TELEPHONE (OUTPATIENT)
Dept: ENDOCRINOLOGY | Facility: CLINIC | Age: 57
End: 2022-11-04

## 2022-11-11 ENCOUNTER — TELEPHONE (OUTPATIENT)
Dept: FAMILY MEDICINE CLINIC | Facility: CLINIC | Age: 57
End: 2022-11-11

## 2022-11-11 ENCOUNTER — PATIENT OUTREACH (OUTPATIENT)
Dept: FAMILY MEDICINE CLINIC | Facility: CLINIC | Age: 57
End: 2022-11-11

## 2022-11-11 NOTE — PROGRESS NOTES
Chart review done  According to notes pt discharged from [facility]  Outreach to Nely Kruse  LVM for SW at [facility] to follow up on discharge plan  Outreach to patients braydon Burton reports that patient has moved to [facility]  LVM for Nely Kruse advising that patient has moved to [facility]  Episode closed   RN CM removed from care team

## 2022-11-11 NOTE — TELEPHONE ENCOUNTER
Please remove Dr Curtis Summers as patients PCP  Per patients son Eliazar Shorten, patient has moved to [place]   Thank you

## 2022-11-22 NOTE — PLAN OF CARE
Problem: MOBILITY - ADULT  Goal: Maintain or return to baseline ADL function  Description: INTERVENTIONS:  -  Assess patient's ability to carry out ADLs; assess patient's baseline for ADL function and identify physical deficits which impact ability to perform ADLs (bathing, care of mouth/teeth, toileting, grooming, dressing, etc )  - Assess/evaluate cause of self-care deficits   - Assess range of motion  - Assess patient's mobility; develop plan if impaired  - Assess patient's need for assistive devices and provide as appropriate  - Encourage maximum independence but intervene and supervise when necessary  - Involve family in performance of ADLs  - Assess for home care needs following discharge   - Consider OT consult to assist with ADL evaluation and planning for discharge  - Provide patient education as appropriate  Outcome: Progressing  Goal: Maintains/Returns to pre admission functional level  Description: INTERVENTIONS:  - Perform BMAT or MOVE assessment daily    - Set and communicate daily mobility goal to care team and patient/family/caregiver     - Collaborate with rehabilitation services on mobility goals if consulted  - Out of bed for toileting  - Record patient progress and toleration of activity level   Outcome: Progressing     Problem: Potential for Falls  Goal: Patient will remain free of falls  Description: INTERVENTIONS:  - Educate patient/family on patient safety including physical limitations  - Instruct patient to call for assistance with activity   - Consult OT/PT to assist with strengthening/mobility   - Keep Call bell within reach  - Keep bed low and locked with side rails adjusted as appropriate  - Keep care items and personal belongings within reach  - Initiate and maintain comfort rounds  - Make Fall Risk Sign visible to staff  - Apply yellow socks and bracelet for high fall risk patients  - Consider moving patient to room near nurses station  Outcome: Progressing     Problem: Prexisting or Pulmonary Progress Note    CC: Hypoxia pleural effusion    Subjective:   Still declines thoracentesi. On 2l O2      Intake/Output Summary (Last 24 hours) at 11/22/2022 1023  Last data filed at 11/22/2022 0516  Gross per 24 hour   Intake 300 ml   Output 1000 ml   Net -700 ml         Exam:   BP (!) 142/59   Pulse 62   Temp 97.5 °F (36.4 °C) (Axillary)   Resp 16   Ht 5' 9\" (1.753 m)   Wt 141 lb 8 oz (64.2 kg)   SpO2 98%   BMI 20.90 kg/m²  on 2 L O2  Gen: No distress. Eyes: PERRL. No sclera icterus. No conjunctival injection. ENT: No discharge. Pharynx clear. Neck: Trachea midline. No obvious mass. Resp: Mild accessory muscle use. Few crackles. No wheezes. No rhonchi. Right dullness on percussion. CV: Regular rate. Regular rhythm. No murmur or rub. 1+ LE edema. GI: Non-tender. Non-distended. M/S: No cyanosis. No joint deformity. No clubbing. Neuro: More alert and awake today. Moves all four extremities. Psych: Oriented x 3. No anxiety    Scheduled Meds:   furosemide  40 mg Oral Daily    atorvastatin  20 mg Oral Daily    finasteride  5 mg Oral Daily    sodium chloride flush  5-40 mL IntraVENous 2 times per day     Continuous Infusions:   sodium chloride       PRN Meds:  perflutren lipid microspheres, sodium chloride flush, sodium chloride, polyethylene glycol, acetaminophen **OR** acetaminophen    Labs:  CBC:   Recent Labs     11/20/22 0439 11/21/22  0504   WBC 6.4 7.1   HGB 11.1* 11.8*   HCT 31.7* 34.7*   MCV 94.6 95.3   PLT 46* see below       BMP:   Recent Labs     11/20/22 0439 11/21/22  0504    142   K 4.1 3.3*    95*   CO2 24 39*   BUN 26* 31*   CREATININE 1.0 0.9       LIVER PROFILE:   Recent Labs     11/20/22 0439 11/21/22  0504   AST 24 17   ALT 15 14   BILITOT 0.6 0.7   ALKPHOS 94 88       PT/INR:   Recent Labs     11/19/22  1253 11/20/22 0439 11/21/22  0505   PROTIME 38.7* 19.2* 14.9*   INR 3.95* 1.63* 1.19*       APTT: No results for input(s):  APTT in the last 72 High Potential for Compromised Skin Integrity  Goal: Skin integrity is maintained or improved  Description: INTERVENTIONS:  - Identify patients at risk for skin breakdown  - Assess and monitor skin integrity  - Assess and monitor nutrition and hydration status  - Monitor labs   - Assess for incontinence   - Turn and reposition patient  - Assist with mobility/ambulation  - Relieve pressure over bony prominences  - Avoid friction and shearing  - Provide appropriate hygiene as needed including keeping skin clean and dry  - Evaluate need for skin moisturizer/barrier cream  - Collaborate with interdisciplinary team   - Patient/family teaching  - Consider wound care consult   Outcome: Progressing     Problem: RESPIRATORY - ADULT  Goal: Achieves optimal ventilation and oxygenation  Description: INTERVENTIONS:  - Assess for changes in respiratory status  - Assess for changes in mentation and behavior  - Position to facilitate oxygenation and minimize respiratory effort  - Oxygen administered by appropriate delivery if ordered  - Initiate smoking cessation education as indicated  - Encourage broncho-pulmonary hygiene including cough, deep breathe, Incentive Spirometry  - Assess the need for suctioning and aspirate as needed  - Assess and instruct to report SOB or any respiratory difficulty  - Respiratory Therapy support as indicated  Outcome: Progressing     Problem: Nutrition/Hydration-ADULT  Goal: Nutrient/Hydration intake appropriate for improving, restoring or maintaining nutritional needs  Description: Monitor and assess patient's nutrition/hydration status for malnutrition  Collaborate with interdisciplinary team and initiate plan and interventions as ordered  Monitor patient's weight and dietary intake as ordered or per policy  Utilize nutrition screening tool and intervene as necessary  Determine patient's food preferences and provide high-protein, high-caloric foods as appropriate       INTERVENTIONS:  - Monitor hours.  UA:  No results for input(s): NITRITE, COLORU, PHUR, LABCAST, WBCUA, RBCUA, MUCUS, TRICHOMONAS, YEAST, BACTERIA, CLARITYU, SPECGRAV, LEUKOCYTESUR, UROBILINOGEN, BILIRUBINUR, BLOODU, GLUCOSEU, AMORPHOUS in the last 72 hours. Invalid input(s): KETONESU    No results for input(s): PHART, YOT6RYV, PO2ART in the last 72 hours. Cultures:   11/15 BC NGTD  11/18 COVID-19 not detected    Films:  CTA 11/18 imaging was reviewed by me and showed   No evidence of aortic dissection or aneurysm. There is diffuse calcific atherosclerotic disease particularly, aorto iliac   atherosclerotic disease. 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 There is a large low-attenuation right pleural effusion causing near complete compressive atelectasis of the right lung. Thoracentesis may be helpful. Airspace disease with volume loss in the lingula and left lower lobe may represent atelectasis and or pneumonia. Status post TAVR. Mild cardiomegaly     ASSESSMENT:  Acute hypoxemic respiratory failure  Large right pleural effusion with compressive atelectasis-suspecting due to CHF  SSS post pacemaker, Aortic stenosis post TAVR, Permanent A. fib on Coumadin  Chronic thrombocytopenia with MDS and history of hairy cell leukemia     PLAN:  Supplemental oxygen to maintain SaO2 >92%; wean as tolerated  Diuresis per internal medicine/cardiology-patient responding well with clinical improvement  Patient declined thoracentesis  Coumadin on hold   Discussed with family at the bedside. I will s/o. Please call with qustions. oral intake, urinary output, labs, and treatment plans  - Assess nutrition and hydration status and recommend course of action  - Evaluate amount of meals eaten  - Assist patient with eating if necessary   - Allow adequate time for meals  - Recommend/ encourage appropriate diets, oral nutritional supplements, and vitamin/mineral supplements  - Order, calculate, and assess calorie counts as needed  - Recommend, monitor, and adjust tube feedings and TPN/PPN based on assessed needs  - Assess need for intravenous fluids  - Provide specific nutrition/hydration education as appropriate  - Include patient/family/caregiver in decisions related to nutrition  Outcome: Progressing

## 2022-11-22 NOTE — TELEPHONE ENCOUNTER
11/22/22 4:19 PM        The office's request has been received, reviewed, and the patient chart updated  The PCP has successfully been removed with a patient attribution note  This message will now be completed          Thank you  Zoila Manzo

## 2023-02-06 NOTE — ED NOTES
Pt seen at bedside by [facility] and Attending, will admit for HTN and pain management     Belem Land RN  08/11/18 8039 Satisfactory

## 2023-02-15 NOTE — TELEPHONE ENCOUNTER
Rufus report in [facility]: Patient should get monthly blood tests, not donate blood, not drive at night if vision affected, not share medication, and not undergo elective surgery for 6 months after tx completed. Side effects reviewed, pt to contact office should one occur.

## 2023-04-22 NOTE — ASSESSMENT & PLAN NOTE
Lab Results   Component Value Date    HGBA1C 12 7 (H) 05/22/2022       Recent Labs     07/15/22  1522 07/15/22  2106 07/16/22  0751 07/16/22  1119   POCGLU 119 290* 316* 366*       Blood Sugar Average: Last 72 hrs:  (P) 768 [id number]   · Uncontrolled per A1c   Presented with hyperglycemia , fortunately non-acidotic, no AG, no DKA  · Home regimen: Recently updated with last admission to Lantus 16U hs, Humalog 4U t i d  w/ meals   · However continues to have hyperglycemia here, last glucose 366   · Will increase Lantus to 20 units QHS and humalog 9 units TID with meals with SSI coverage  · QID glucose checks   · Hypoglycemia protocol  · Consistent carb diet   · Monitor and adjust regimen as needed Nasal Fracture    A nasal fracture is a break or crack in the bones or cartilage of the nose. Minor breaks do not require treatment. These breaks usually heal on their own after about one month. Serious breaks may require surgery.     CAUSES  This injury is usually caused by a blunt injury to the nose. This type of injury often occurs from:    Contact sports.  Car accidents.  Falls.  Getting punched.    SYMPTOMS  Symptoms of this injury include:    Pain.  Swelling of the nose.  Bleeding from the nose.  Bruising around the nose or eyes. This may include having black eyes.  Crooked appearance of the nose.    DIAGNOSIS  This injury may be diagnosed with a physical exam. The health care provider will gently feel the nose for signs of broken bones. He or she will look inside the nostrils to make sure that there is not a blood-filled swelling on the dividing wall between the nostrils (septal hematoma). X-rays of the nose may not show a nasal fracture even when one is present. In some cases, X-rays or a CT scan may be done 1–5 days after the injury. Sometimes, the health care provider will want to wait until the swelling has gone down.    TREATMENT  Often, minor fractures that have caused no deformity do not require treatment. More serious fractures in which bones have moved out of position may require surgery, which will take place after the swelling is gone. Surgery will stabilize and align the fracture. In some cases, a health care provider may be able to reposition the bones without surgery. This may be done in the health care provider's office after medicine is given to numb the area (local anesthetic).    HOME CARE INSTRUCTIONS  If directed, apply ice to the injured area:  Put ice in a plastic bag.  Place a towel between your skin and the bag.  Leave the ice on for 20 minutes, 2–3 times per day.  Take over-the-counter and prescription medicines only as told by your health care provider.  If your nose starts to bleed, sit in an upright position while you squeeze the soft parts of your nose against the dividing wall between your nostrils (septum) for 10 minutes.  Try to avoid blowing your nose.  Return to your normal activities as told by your health care provider. Ask your health care provider what activities are safe for you.  Avoid contact sports for 3–4 weeks or as told by your health care provider.  Keep all follow-up visits as told by your health care provider. This is important.    SEEK MEDICAL CARE IF:  Your pain increases or becomes severe.  You continue to have nosebleeds.  The shape of your nose does not return to normal within 5 days.  You have pus draining out of your nose.    SEEK IMMEDIATE MEDICAL CARE IF:  You have bleeding from your nose that does not stop after you pinch your nostrils closed for 20 minutes and keep ice on your nose.  You have clear fluid draining out of your nose.  You notice a grape-like swelling on the septum. This swelling is a collection of blood (hematoma) that must be drained to help prevent infection.  You have difficulty moving your eyes.  You have repeated vomiting.    ADDITIONAL NOTES AND INSTRUCTIONS    Please follow up with your Primary MD in 24-48 hr.  Seek immediate medical care for any new/worsening signs or symptoms.       Non-weight bearing fracture    A fracture is a break in one of your bones. This can occur from a variety of injuries especially traumatic ones. Symptoms include pain, bruising, or swelling.  A splint might have been applied by your health care provider. Make sure to keep it dry and follow up with an orthopedist as instructed.    SEEK IMMEDIATE MEDICAL CARE IF YOU HAVE THE FOLLOWING SYMPTOMS: numbness, tingling, pain, or weakness in any part of your body distal to the fracture.

## 2024-01-06 NOTE — ASSESSMENT & PLAN NOTE
On Lexapro 10 mg daily as well as Atarax prn Progress Note       SUBJECTIVE:  Pain well controlled. PT recommended SNF, patient continues to refuse. Still considering possible transfer to [facility].    Denies f/c/n/v/cp/sob, numbness/tingling    OBJECTIVE:    Vital signs in last 24 hours:    Patient Vitals for the past 24 hrs:   BP Temp Temp src Pulse Resp SpO2 Weight   01/06/24 0710 103/64 97.9 °F (36.6 °C) Oral 96 18 100 % --   01/06/24 0528 -- -- -- -- -- -- 93.2 kg (205 lb 7.5 oz)   01/06/24 0507 -- -- -- -- 16 -- --   01/06/24 0343 114/70 98.2 °F (36.8 °C) Oral 82 17 100 % --   01/05/24 2338 106/61 98.2 °F (36.8 °C) Oral 98 16 98 % --   01/05/24 2244 -- -- -- -- 16 -- --   01/05/24 1857 110/63 98.1 °F (36.7 °C) Oral 96 17 97 % --   01/05/24 1755 -- -- -- -- 16 -- --   01/05/24 1655 -- -- -- -- 18 -- --   01/05/24 1550 99/58 98.4 °F (36.9 °C) Oral 87 16 100 % --   01/05/24 1408 -- -- -- -- 16 -- --   01/05/24 1308 -- -- -- -- 16 -- --   01/05/24 1043 91/51 97.7 °F (36.5 °C) Oral 70 16 97 % --       Physical Exam:  Alert and oriented, NAD  RLE:  Dressing clean and dry  No redness, warmth, signs of infection  Mild swelling  Calf supple, NT  Ankle df pf inv ev, toe flexion and extension intact  Sensation intact to light touch  Warm, well perfused    Data Review:    Chem:  Lab Results   Component Value Date    CO2 35 (H) 01/06/2024    CO2 27 11/16/2020    BUN 33 (H) 01/06/2024    BUN 8 11/16/2020    CREATININE 0.65 01/06/2024    CREATININE 0.49 (L) 11/16/2020    GLUCOSE 223 (H) 01/06/2024    GLUCOSE 170 (H) 11/16/2020    CALCIUM 8.6 01/06/2024    CALCIUM 9.9 11/16/2020     CBC:  Lab Results   Component Value Date    WBC 8.5 01/06/2024    WBC 9.6 12/17/2019    RBC 3.18 (L) 01/06/2024    RBC 5.47 (H) 12/17/2019    HGB 9.6 (L) 01/06/2024    HGB 16.0 (H) 12/17/2019     (L) 01/06/2024     12/17/2019    HCT 28.9 (L) 01/06/2024    HCT 47.5 (H) 12/17/2019       No components found for: \"ORGANISMID\"    Current Facility-Administered Medications   Medication     apixaBAN (ELIQUIS) tablet 5 mg    insulin glargine (LANTUS) injection 30 Units    torsemide (DEMADEX) tablet 40 mg    nystatin (MYCOSTATIN) powder    oxyCODONE (IMM REL) (ROXICODONE) tablet 5 mg    oxyCODONE (IMM REL) (ROXICODONE) tablet 10 mg    acetaminophen (TYLENOL) tablet 650 mg    docusate sodium-sennosides (SENOKOT S) 50-8.6 MG 1 tablet    HYDROmorphone (DILAUDID) injection 0.5 mg    lidocaine 2% urethral (UROJET) 2 % jelly 10 mL    atorvastatin (LIPITOR) tablet 40 mg    cycloSPORINE (RESTASIS) 0.05 % ophthalmic emulsion 1 drop    losartan (COZAAR) tablet 50 mg    meclizine (ANTIVERT) tablet 25 mg    [Held by provider] metOLazone (ZAROXOLYN) tablet 2.5 mg    metoPROLOL succinate (TOPROL-XL) ER tablet 25 mg    pregabalin (LYRICA) capsule 150 mg    HYDROcodone-acetaminophen (NORCO)  MG per tablet 1 tablet    ondansetron (ZOFRAN) injection 4 mg    dextrose 50 % injection 25 g    dextrose 50 % injection 12.5 g    glucagon (GLUCAGEN) injection 1 mg    dextrose (GLUTOSE) 40 % gel 15 g    dextrose (GLUTOSE) 40 % gel 30 g    insulin lispro (ADMELOG,HumaLOG) - Correction Dose    insulin lispro (ADMELOG,HumaLOG) - Correction Dose    potassium CHLORIDE (KLOR-CON M) leo ER tablet 20 mEq    loperamide (IMODIUM) capsule 2 mg           ASSESSMENT/PLAN:  POD#3 Right Long TFN    WBAT  Continue pain meds  Change dressing daily or prn saturation with gauze and tegaderm or every 7 days with opsite/Silveron  DVT ppx - aspirin 81mg BID  Dc meds in chart  Dc planning -  pending  - ok to dc per ortho when medically cleared with PT recommendations  Follow up with Dr. Florentino Mayo 2 weeks post op

## 2025-06-25 NOTE — PROGRESS NOTES
[facility]  Andrew Caro M.D.  [address] [facility] [address]  [facility] [address]  Fax: [phone]   Authorization for Release/Disclosure of   Protected Health Information   Name: EDNA STRICKLAND : 1950 SSN: [id number]   Address: [address] [place] [id number] Phone:    There are no phone numbers on file.   I authorize the entity listed below to release/disclose the PHI below to:   [facility]/Andrew Caro M.D. and Andrew Caro M.D.   Provider or Entity Name:  [facility]    Address   City, State, Zip   Phone:      Fax:     Reason for request: continuity of care   Information to be released:    [  ] LAST COLONOSCOPY,  including any PATH REPORT and follow-up  [  ] LAST FIT/COLOGUARD RESULT [  ] LAST DEXA  [xxx  ] LAST MAMMOGRAM  [  ] LAST PAP  [  ] LAST LABS [  ] RETINA EXAM REPORT  [  ] IMMUNIZATION RECORDS  [  ] Release all info      [  ] Check here and initial the line next to each item to release ALL health information INCLUDING  _____ Care and treatment for drug and / or alcohol abuse  _____ HIV testing, infection status, or AIDS  _____ Genetic Testing    DATES OF SERVICE OR TIME PERIOD TO BE DISCLOSED: _____________  I understand and acknowledge that:  * This Authorization may be revoked at any time by you in writing, except if your health information has already been used or disclosed.  * Your health information that will be used or disclosed as a result of you signing this authorization could be re-disclosed by the recipient. If this occurs, your re-disclosed health information may no longer be protected by State or Federal laws.  * You may refuse to sign this Authorization. Your refusal will not affect your ability to obtain treatment.  * This Authorization becomes effective upon signing and will  on (date) __________.      If no date is indicated, this Authorization will  one (1) year from the signature date.    Name: Edna Loco  Progress Note - Infectious Disease   Sherly Barajas 65 y o  female MRN: [id number]  Unit/Bed#: S -01 Encounter: [id number]      IMPRESSION & RECOMMENDATIONS:   Impression/Recommendations:  1  Developing sepsis   Tachypnea, leukocytosis   Secondary to #2   With worsening leukocytosis likely due to source control issue   Otherwise remains hemodynamically stable      -continue IV vancomycin for now with dosing recommendations for pharmacy  -continue IV cefepime  -follow temperatures and hemodynamics  -follow CBC     2  Loculated right pleural effusion/possible empyema   Most recent CT shows new partially loculated right pleural effusion   No chest wall or lung abscesses  Sputum culture from 07/26 showed MSSA and Pseudomonas  Patient does have history of MRSA pneumonia in late 2021  Now status post chest tube on 07/29  Fluid WBC count is nearly 10,000 with neutrophil predominance      -antibiotics as above  -follow-up pleural fluid culture  -chest tube management per IR/pulmonology     3  Chest wall wound infection  At the site of recent chest tube placement for management #4  CT shows small loculated component at this site with no fluid collection in the chest wall  Superficial culture now shows MRSA      -continue vancomycin, as above  -serial exams  -daily local wound care and dressing changes     4  Recent spontaneous pneumothorax   Status post chest tube placement on 07/20, removal on 07/24   Resolved on repeat imaging      5  Acute respiratory insufficiency, on chronic hypoxic respiratory failure/tracheostomy dependence   Multifactorial in the setting of recent pneumothorax, new right pleural effusion, acute CHF, COPD      -antibiotic plan as above  -monitor O2 requirements  -pulmonology follow-up ongoing  -volume management as per Internal Medicine Service     6   DM 2, with hyperglycemia and elevated hemoglobin A1c of 12 7   Risk factor for infection   Glycemic control as per Internal Medicine Phillip  Signature: Date:   6/25/2025     PLEASE FAX REQUESTED RECORDS BACK TO: [phone]   Service      7  Acute kidney injury, on CKD 3  Creatinine is now much improved      -dose adjust antibiotic based on renal function as indicated  -follow BMP     Antibiotics:  Vancomycin/cefepime 5         Subjective:  Chest tube was placed yesterday by IR with removal of cloudy fluid  Has soreness at chest tube site  No fevers  Stable O2 requirements  Objective:  Vitals:  Temp:  [97 8 °F (36 6 °C)-98 3 °F (36 8 °C)] 98 1 °F (36 7 °C)  HR:  [51-89] 67  Resp:  [17-18] 18  BP: (102-137)/() 137/107  SpO2:  [94 %-100 %] 100 %  Temp (24hrs), Av 1 °F (36 7 °C), Min:97 8 °F (36 6 °C), Max:98 3 °F (36 8 °C)  Current: Temperature: 98 1 °F (36 7 °C)    Physical Exam:   General:  No acute distress, debilitated, nontoxic, appears comfortable in bed  HEENT:  Atraumatic normocephalic  Neck:  Trach in place  Psychiatric:  Awake and alert  Pulmonary:  Normal respiratory excursion without accessory muscle use  New right-sided chest tube in place  Abdomen:  Soft, nontender  Extremities:  No edema  Skin:  No rashes    Lab Results:  I have personally reviewed pertinent labs  Results from last 7 days   Lab Units 22  0504 22  0507 22  0522   POTASSIUM mmol/L 4 4 4 3 4 4   < > 3 9   CHLORIDE mmol/L 98 96 95*   < > 98   CO2 mmol/L 24 26 26   < > 27   BUN mg/dL 50* 47* 43*   < > 31*   CREATININE mg/dL 1 19 1 33* 1 08   < > 0 88   EGFR ml/min/1 73sq m 51 44 57   < > 73   CALCIUM mg/dL 8 1* 8 5 8 4   < > 8 7   AST U/L  --  9*  --   --  10*   ALT U/L  --  6*  --   --  7   ALK PHOS U/L  --  91  --   --  96    < > = values in this interval not displayed       Results from last 7 days   Lab Units 227 22  0504   WBC Thousand/uL 18 08* 17 26* 17 34*   HEMOGLOBIN g/dL 7 6* 8 2* 8 4*   PLATELETS Thousands/uL 583* 505* 487*     Results from last 7 days   Lab Units 22  1529 22  1201 22  1349 22  1109   SPUTUM CULTURE   --   --  2+ Growth of Staphylococcus aureus*  1+ Growth of Pseudomonas aeruginosa*  1+ Growth of   --    GRAM STAIN RESULT  1+ Polys  No organisms seen 3+ Polys*  2+ Gram positive cocci in pairs* 1+ Epithelial cells per low power field*  3+ Polys*  1+ Gram positive cocci in pairs*  Rare Gram positive rods*  --    WOUND CULTURE   --  2+ Growth of Methicillin Resistant Staphylococcus aureus*  --   --    MRSA CULTURE ONLY   --   --   --  No Methicillin Resistant Staphlyococcus aureus (MRSA) isolated       Imaging Studies:   I have personally reviewed pertinent imaging study reports and images in PACS  EKG, Pathology, and Other Studies:   I have personally reviewed pertinent reports  IR procedure report reviewed

## (undated) DEVICE — SUT VICRYL 2-0 SH 27 IN UNDYED J417H

## (undated) DEVICE — APPLICATOR ENDO SURGICEL

## (undated) DEVICE — GUIDEWIRE WHOLEY HI TORQUE INTERM MOD J .035 145CM

## (undated) DEVICE — WOUND RETRACTOR AND PROTECTOR: Brand: ALEXIS WOUND PROTECTOR-RETRACTOR

## (undated) DEVICE — NEEDLE SPINAL 20G X 3.5 LF

## (undated) DEVICE — THE TURNPIKE CATHETERS ARE INTENDED TO BE USED TO ACCESS DISCRETE REGIONS OF THE CORONARY AND/OR PERIPHERAL VASCULATURE. THEY MAY BE USED TO FACILITATE PLACEMENT AND EXCHANGE OF GUIDEWIRES AND TO SUBSELECTIVELY INFUSE/DELIVER DIAGNOSTIC AND THERAPEUTIC AGENTS.: Brand: TURNPIKE® CATHETER

## (undated) DEVICE — TRAY FOLEY 16FR URIMETER SURESTEP

## (undated) DEVICE — TUBING SUCTION 5MM X 12 FT

## (undated) DEVICE — INTENDED FOR TISSUE SEPARATION, AND OTHER PROCEDURES THAT REQUIRE A SHARP SURGICAL BLADE TO PUNCTURE OR CUT.: Brand: BARD-PARKER SAFETY BLADES SIZE 15, STERILE

## (undated) DEVICE — RADIFOCUS OPTITORQUE ANGIOGRAPHIC CATHETER: Brand: OPTITORQUE

## (undated) DEVICE — CATH THROMBECTOMY EXPORT 6FR 140CM

## (undated) DEVICE — SUT PROLENE 0 CT-1 30 IN 8424H

## (undated) DEVICE — CATH IVUS EAGLE EYE PLATINUM 5FR 150CM

## (undated) DEVICE — SUT VICRYL 3-0 SH 27 IN J416H

## (undated) DEVICE — CATH GUIDE LAUNCHER 6FR EBU 3.5

## (undated) DEVICE — RADIFOCUS GLIDEWIRE: Brand: GLIDEWIRE

## (undated) DEVICE — STERILE THORACIC PACK: Brand: CARDINAL HEALTH

## (undated) DEVICE — TRACHEOSTOMY TUBE CUFFLESS WITH INNER CANNULA,FENESTRATED: Brand: SHILEY

## (undated) DEVICE — GLIDESHEATH SLENDER STAINLESS STEEL KIT: Brand: GLIDESHEATH SLENDER

## (undated) DEVICE — GAUZE SPONGES,16 PLY: Brand: CURITY

## (undated) DEVICE — GLOVE SRG BIOGEL ECLIPSE 7.5

## (undated) DEVICE — SUT MONOCRYL 4-0 PS-2 18 IN Y496G

## (undated) DEVICE — CATH DIAG 5FR IMPULSE 110CM PIG

## (undated) DEVICE — TRACH TUBE HOLDER

## (undated) DEVICE — THROMBECTOMY CAT RX INDIGO SYS LRG 140CM

## (undated) DEVICE — DOUBLE TUBING WITH LARGE CONNECTOR FOR THORACIC SUCTION SYSTEM PUMP: Brand: THOPAZ TUBING DOUBLE

## (undated) DEVICE — OASIS DRAIN, SINGLE, INLINE & ATS COMPATIBLE: Brand: OASIS

## (undated) DEVICE — HI-TORQUE PILOT 50 GUIDE WIRE .014 STRAIGHT TIP 3.0 CM X 300 CM: Brand: HI-TORQUE PILOT

## (undated) DEVICE — GUIDEWIRE .035 260CM 3MM J TIP

## (undated) DEVICE — NEEDLE 25G X 1 1/2

## (undated) DEVICE — TR BAND RADIAL ARTERY COMPRESSION DEVICE: Brand: TR BAND

## (undated) DEVICE — GLOVE INDICATOR PI UNDERGLOVE SZ 8 BLUE

## (undated) DEVICE — Device

## (undated) DEVICE — TREK CORONARY DILATATION CATHETER 3.0 MM X 15 MM / RAPID-EXCHANGE: Brand: TREK

## (undated) DEVICE — DGW .035 FC J3MM 260CM TEF: Brand: EMERALD

## (undated) DEVICE — PERCUTANEOUS ENDOSCOPIC GASTROSTOMY KIT: Brand: ENDOVIVE SAFETY PEG KIT

## (undated) DEVICE — SPECIMEN TRAP: Brand: ARGYLE

## (undated) DEVICE — Device: Brand: DEFENDO AIR/WATER/SUCTION AND BIOPSY VALVE

## (undated) DEVICE — SUT VICRYL 0 CT-1 27 IN J260H

## (undated) DEVICE — ADHESIVE SKIN HIGH VISCOSITY EXOFIN 1ML

## (undated) DEVICE — ASTOUND STANDARD SURGICAL GOWN, XL: Brand: CONVERTORS

## (undated) DEVICE — CHLORAPREP HI-LITE 26ML ORANGE

## (undated) DEVICE — Device: Brand: MINAMO

## (undated) DEVICE — RUNTHROUGH NS EXTRA FLOPPY PTCA GUIDEWIRE: Brand: RUNTHROUGH

## (undated) DEVICE — ADULT FLEXIBLE TRACHEOSTOMY TUBE WITH TAPERGUARD CUFF DISPOSABLE INNER CANNULA: Brand: SHILEY

## (undated) DEVICE — CATH SWAN GANZ VIP 7.5FR 110CM NON HEPARIN

## (undated) DEVICE — GLOVE SRG BIOGEL 5.5

## (undated) DEVICE — SPECIMEN CONTAINER STERILE PEEL PACK

## (undated) DEVICE — PINNACLE INTRODUCER SHEATH: Brand: PINNACLE

## (undated) DEVICE — SUCTION CATH 18 FR

## (undated) DEVICE — CATH DIAG 5FR IMPULSE 100CM FR4

## (undated) DEVICE — CATH DIAG 6FR IMPULSE 100CM FL4

## (undated) DEVICE — GLOVE INDICATOR UNDERGLOVE SZ 6 BLUE

## (undated) DEVICE — ELECTRODE BLADE MOD E-Z CLEAN 2.5IN 6.4CM -0012M

## (undated) DEVICE — BINDER ABDOMINAL 63-74 IN

## (undated) DEVICE — INTENDED FOR TISSUE SEPARATION, AND OTHER PROCEDURES THAT REQUIRE A SHARP SURGICAL BLADE TO PUNCTURE OR CUT.: Brand: BARD-PARKER SAFETY BLADES SIZE 10, STERILE

## (undated) DEVICE — CATH DIAG 6FR IMPULSE 100CM FR4

## (undated) DEVICE — THROMBECTOMY CAT RX INGIGO CANISTER PUMP ENGINE

## (undated) DEVICE — 28 FR RIGHT ANGLE – SOFT PVC CATHETER: Brand: PVC THORACIC CATHETERS

## (undated) DEVICE — BALLOON EUPHORA RX 1.5 X 15MM

## (undated) DEVICE — SPONGE TONSIL STRUNG 7/8 IN X-RAY DETECT

## (undated) DEVICE — HEMOSTAT POWDER ADSORB SURGICEL 3GM

## (undated) DEVICE — TREK CORONARY DILATATION CATHETER 2.50 MM X 15 MM / RAPID-EXCHANGE: Brand: TREK

## (undated) DEVICE — 28 FR STRAIGHT – SOFT PVC CATHETER: Brand: PVC THORACIC CATHETERS

## (undated) DEVICE — CANISTER FOR THORACIC SUCTION SYSTEM: Brand: THOPAZ DISPOSABLE CANISTER 0.8L